# Patient Record
Sex: FEMALE | Race: BLACK OR AFRICAN AMERICAN | NOT HISPANIC OR LATINO | Employment: OTHER | URBAN - METROPOLITAN AREA
[De-identification: names, ages, dates, MRNs, and addresses within clinical notes are randomized per-mention and may not be internally consistent; named-entity substitution may affect disease eponyms.]

---

## 2017-02-01 ENCOUNTER — HOSPITAL ENCOUNTER (EMERGENCY)
Facility: HOSPITAL | Age: 82
Discharge: HOME | End: 2017-02-01
Attending: EMERGENCY MEDICINE
Payer: MEDICARE

## 2017-02-01 VITALS
BODY MASS INDEX: 23.39 KG/M2 | RESPIRATION RATE: 20 BRPM | SYSTOLIC BLOOD PRESSURE: 172 MMHG | OXYGEN SATURATION: 97 % | TEMPERATURE: 98 F | DIASTOLIC BLOOD PRESSURE: 74 MMHG | HEIGHT: 63 IN | HEART RATE: 64 BPM | WEIGHT: 132 LBS

## 2017-02-01 DIAGNOSIS — N18.9 CHRONIC KIDNEY DISEASE, UNSPECIFIED STAGE: ICD-10-CM

## 2017-02-01 DIAGNOSIS — R42 ORTHOSTATIC DIZZINESS: Primary | ICD-10-CM

## 2017-02-01 DIAGNOSIS — I71.40 ABDOMINAL AORTIC ANEURYSM: ICD-10-CM

## 2017-02-01 LAB
ALBUMIN SERPL BCP-MCNC: 3.3 G/DL
ALP SERPL-CCNC: 62 U/L
ALT SERPL W/O P-5'-P-CCNC: 17 U/L
ANION GAP SERPL CALC-SCNC: 11 MMOL/L
AST SERPL-CCNC: 24 U/L
BACTERIA #/AREA URNS HPF: ABNORMAL /HPF
BASOPHILS # BLD AUTO: 0.1 K/UL
BASOPHILS NFR BLD: 1.1 %
BILIRUB SERPL-MCNC: 0.2 MG/DL
BILIRUB UR QL STRIP: NEGATIVE
BUN SERPL-MCNC: 33 MG/DL
CALCIUM SERPL-MCNC: 9.3 MG/DL
CHLORIDE SERPL-SCNC: 106 MMOL/L
CLARITY UR: CLEAR
CO2 SERPL-SCNC: 23 MMOL/L
COLOR UR: YELLOW
CREAT SERPL-MCNC: 1.2 MG/DL
DIFFERENTIAL METHOD: ABNORMAL
EOSINOPHIL # BLD AUTO: 0.1 K/UL
EOSINOPHIL NFR BLD: 1.8 %
ERYTHROCYTE [DISTWIDTH] IN BLOOD BY AUTOMATED COUNT: 13.9 %
EST. GFR  (AFRICAN AMERICAN): 47 ML/MIN/1.73 M^2
EST. GFR  (NON AFRICAN AMERICAN): 41 ML/MIN/1.73 M^2
GLUCOSE SERPL-MCNC: 88 MG/DL
GLUCOSE UR QL STRIP: NEGATIVE
HCT VFR BLD AUTO: 32.9 %
HGB BLD-MCNC: 11.1 G/DL
HGB UR QL STRIP: NEGATIVE
HYALINE CASTS #/AREA URNS LPF: 3 /LPF
KETONES UR QL STRIP: NEGATIVE
LACTATE SERPL-SCNC: 1.2 MMOL/L
LEUKOCYTE ESTERASE UR QL STRIP: NEGATIVE
LIPASE SERPL-CCNC: 49 U/L
LYMPHOCYTES # BLD AUTO: 1.5 K/UL
LYMPHOCYTES NFR BLD: 22.2 %
MCH RBC QN AUTO: 31.8 PG
MCHC RBC AUTO-ENTMCNC: 33.8 %
MCV RBC AUTO: 94 FL
MICROSCOPIC COMMENT: ABNORMAL
MONOCYTES # BLD AUTO: 0.5 K/UL
MONOCYTES NFR BLD: 8 %
NEUTROPHILS # BLD AUTO: 4.6 K/UL
NEUTROPHILS NFR BLD: 66.9 %
NITRITE UR QL STRIP: NEGATIVE
PH UR STRIP: 6 [PH] (ref 5–8)
PLATELET # BLD AUTO: 183 K/UL
PMV BLD AUTO: 8.8 FL
POCT GLUCOSE: 102 MG/DL (ref 70–110)
POTASSIUM SERPL-SCNC: 4.9 MMOL/L
PROT SERPL-MCNC: 6.8 G/DL
PROT UR QL STRIP: ABNORMAL
RBC # BLD AUTO: 3.5 M/UL
RBC #/AREA URNS HPF: 0 /HPF (ref 0–4)
SODIUM SERPL-SCNC: 140 MMOL/L
SP GR UR STRIP: 1.02 (ref 1–1.03)
T4 FREE SERPL-MCNC: 0.73 NG/DL
TROPONIN I SERPL DL<=0.01 NG/ML-MCNC: <0.006 NG/ML
TSH SERPL DL<=0.005 MIU/L-ACNC: 10.81 UIU/ML
URN SPEC COLLECT METH UR: ABNORMAL
UROBILINOGEN UR STRIP-ACNC: NEGATIVE EU/DL
WBC # BLD AUTO: 6.9 K/UL
WBC #/AREA URNS HPF: 0 /HPF (ref 0–5)

## 2017-02-01 PROCEDURE — 99284 EMERGENCY DEPT VISIT MOD MDM: CPT | Mod: 25

## 2017-02-01 PROCEDURE — 96361 HYDRATE IV INFUSION ADD-ON: CPT

## 2017-02-01 PROCEDURE — 83605 ASSAY OF LACTIC ACID: CPT

## 2017-02-01 PROCEDURE — 36415 COLL VENOUS BLD VENIPUNCTURE: CPT

## 2017-02-01 PROCEDURE — 25000003 PHARM REV CODE 250: Performed by: EMERGENCY MEDICINE

## 2017-02-01 PROCEDURE — 85025 COMPLETE CBC W/AUTO DIFF WBC: CPT

## 2017-02-01 PROCEDURE — 84439 ASSAY OF FREE THYROXINE: CPT

## 2017-02-01 PROCEDURE — 81000 URINALYSIS NONAUTO W/SCOPE: CPT

## 2017-02-01 PROCEDURE — 84484 ASSAY OF TROPONIN QUANT: CPT

## 2017-02-01 PROCEDURE — 84443 ASSAY THYROID STIM HORMONE: CPT

## 2017-02-01 PROCEDURE — 93005 ELECTROCARDIOGRAM TRACING: CPT

## 2017-02-01 PROCEDURE — 96360 HYDRATION IV INFUSION INIT: CPT

## 2017-02-01 PROCEDURE — 83690 ASSAY OF LIPASE: CPT

## 2017-02-01 PROCEDURE — 80053 COMPREHEN METABOLIC PANEL: CPT

## 2017-02-01 RX ADMIN — SODIUM CHLORIDE 500 ML: 0.9 INJECTION, SOLUTION INTRAVENOUS at 03:02

## 2017-02-01 NOTE — ED PROVIDER NOTES
"Encounter Date: 2/1/2017       History     Chief Complaint   Patient presents with    General Illness     Nausea, weakness, heart beating fast.  Denies fever.  No vomiting.  Onset of symtoms Monday.       Review of patient's allergies indicates:   Allergen Reactions    Carvedilol Other (See Comments)     Bradycardia and syncope    Boniva [ibandronate]     Codeine     Hydralazine analogues     Iodinated contrast media - iv dye Hives    Lisinopril     Morphine      HPI Comments: Pt is an 86 year old female with a PMH of T2DM, CKD, hypothyroid, HTN, COPD without oxygen therapy, AAA without rupture, anemia, recurrent syncope and precyncope with work up presenting to the ED with complaints of a brief period of dizziness with postural change and then noting "heart fluttering" that rapidly resolved. She has also been reporting a sensation of abdominal bloating and belching over the last 24 hours. She reports a history of dehydration and has been evaluated by cardiology multiple times in the past but states no acute cardiac pathology could be noted at that time. She states she has not been drinking as much water daily as recommended by her doctors because of frequent urination and this includes the last few days. She denies associated syncope, CP, SOB, abdominal pain, dysuria, confusion, visual or auditory changes, fever, unilateral or BL LE swelling, change in food intake, lateralizing weakness, or strength or sensation deficits.     The history is provided by the patient.     Past Medical History   Diagnosis Date    Anticoagulant long-term use      aspirin    COPD (chronic obstructive pulmonary disease)     COPD with acute exacerbation 1/9/2015    Diabetes mellitus type II     Encounter for blood transfusion     Hip arthritis 3/1/2016    Hyperlipidemia     Hypertension     Thyroid disease      hypothyroid     Past Medical History Pertinent Negatives   Diagnosis Date Noted    Cancer 1/8/2015    CHF " (congestive heart failure) 1/8/2015    Coronary artery disease 1/8/2015    Seizures 1/8/2015    Stroke 1/8/2015    Transfusion reaction 1/8/2015     Past Surgical History   Procedure Laterality Date    Hysterectomy      Appendectomy      Varicose vein surgery      Fracture surgery       right hip     Hernia repair       groin     Family History   Problem Relation Age of Onset    Hypertension Mother     Diabetes Sister     Diabetes Brother     Kidney disease Son      Social History   Substance Use Topics    Smoking status: Former Smoker     Years: 44.00     Types: Cigarettes     Quit date: 4/30/2015    Smokeless tobacco: Never Used      Comment: 1/08/2015    Alcohol use No     Review of Systems   Constitutional: Negative for fever.   HENT:        Dry mouth and mucus membranes   Eyes: Negative for visual disturbance.   Respiratory: Negative for shortness of breath.    Cardiovascular: Positive for palpitations. Negative for chest pain.   Gastrointestinal: Negative for nausea and vomiting.   Genitourinary: Negative for dysuria.   Skin: Negative for rash.   Neurological: Negative for headaches.   Psychiatric/Behavioral: Negative for confusion.       Physical Exam   Initial Vitals   BP Pulse Resp Temp SpO2   02/01/17 1333 02/01/17 1333 02/01/17 1333 02/01/17 1333 02/01/17 1333   148/67 67 20 97.8 °F (36.6 °C) 97 %     Physical Exam    Nursing note and vitals reviewed.  Constitutional: She is not diaphoretic. No distress.   HENT:   Head: Normocephalic and atraumatic.   Right Ear: External ear normal.   Left Ear: External ear normal.   Dry mucus membranes    Eyes: Conjunctivae and EOM are normal.   Neck: Normal range of motion. Neck supple.   Cardiovascular: Normal rate, regular rhythm and intact distal pulses.   Pulmonary/Chest: Breath sounds normal. No respiratory distress. She has no wheezes.   Abdominal: Soft. Bowel sounds are normal. She exhibits no distension. There is no tenderness.   No palpable  pulsatile mass   Musculoskeletal: Normal range of motion. She exhibits no edema.   Neurological: She is alert and oriented to person, place, and time. She has normal strength. No cranial nerve deficit or sensory deficit.   Skin: Skin is warm and dry. No rash noted.   Psychiatric: She has a normal mood and affect. Thought content normal.         ED Course   Procedures  Labs Reviewed   CBC W/ AUTO DIFFERENTIAL - Abnormal; Notable for the following:        Result Value    RBC 3.50 (*)     Hemoglobin 11.1 (*)     Hematocrit 32.9 (*)     MCH 31.8 (*)     MPV 8.8 (*)     All other components within normal limits   COMPREHENSIVE METABOLIC PANEL - Abnormal; Notable for the following:     BUN, Bld 33 (*)     Albumin 3.3 (*)     eGFR if  47 (*)     eGFR if non  41 (*)     All other components within normal limits   URINALYSIS - Abnormal; Notable for the following:     Protein, UA 2+ (*)     All other components within normal limits   TSH - Abnormal; Notable for the following:     TSH 10.815 (*)     All other components within normal limits   URINALYSIS MICROSCOPIC - Abnormal; Notable for the following:     Hyaline Casts, UA 3 (*)     All other components within normal limits   LACTIC ACID, PLASMA   LIPASE   TROPONIN I   T4, FREE   POCT GLUCOSE   POCT GLUCOSE MONITORING CONTINUOUS     EKG Readings: (Independently Interpreted)   Initial Reading: No STEMI. Previous EKG: Compared with most recent EKG Previous EKG Date: 11/11/2016. Heart Rate: 62.   Normal sinus rhythm with sinus arrhythmia, septal infarct, age undetermined, normal axis, normal intervals          Medical Decision Making:   Initial Assessment:   Pt noted to be in NAD on initial exam. Nonfocal. Cardiac exam WNL. Did appear mild-moderatly dehydrated on PE. Orthostatic hypotension present on examination as indicated by a >20 mm Hg drop when moving from recumbent to standing. Iv was established and she was provided bolus hydration with  oral rehydration as well.   Differential Diagnosis:   DDX include (but are not limited to) atypical ACS, occult infection including UTI, mild-moderate dehydration, electrolyte abnormality, Hypothyroidism, pancreatitis, gastritis, AAA enlargement versus rupture, hypoglycemia, anemia   Clinical Tests:   Lab Tests: Ordered and Reviewed  Radiological Study: Ordered and Reviewed  ED Management:  Patient received hydration therapy and monitoring in the presence of her two daughters. Screening blood work and abdominal aorta US were obtained and found to be negative for significant change. On reassessment, she reported feeling better with no recurrence of reported symptoms, even after posture changes and walking around the ED at different intervals  Other:   I have discussed this case with another health care provider.  I did have a lengthy with the patient and family after a prolonged period of ED observation and monitoring. She was requesting DC and I do feel she is currently stable for DC. She was educated about hydration and why these complaints appear to be recurring. I spoke with her cardiologist Dr Herbert regarding disposition and office follow up which he did endorse. She was additionally asked to f/u with her PCP as soon as possible.     She was urged to the return to the ED immediately for any new, worsening or concerning symptoms, pt. Agreeable with this plan for f/u and was discharged in stable condition.                    ED Course     Clinical Impression:   The primary encounter diagnosis was Orthostatic dizziness. Diagnoses of Abdominal aortic aneurysm and Chronic kidney disease, unspecified stage were also pertinent to this visit.          Gee Carbone MD  02/02/17 0735

## 2017-02-01 NOTE — ED AVS SNAPSHOT
[facility] [address]  [place] [address]               Blaire Cage   2017  1:38 PM   ED    Description:  Female : 1930   Department:  [facility]           Your Care was Coordinated By:     Provider Role From To    Gee Carbone MD Attending Provider 17 1426 --      Reason for Visit     General Illness           Diagnoses this Visit        Comments    Orthostatic dizziness    -  Primary     Abdominal aortic aneurysm         Chronic kidney disease, unspecified stage           ED Disposition     ED Disposition Condition Comment    Discharge             To Do List           Follow-up Information     Follow up with Moises Banks MD.    Specialty:  Family Medicine    Contact information:    [address]  [address]  [place] [address]  [phone]          Follow up with Ashok Herbert MD. Call in 1 day.    Specialty:  Cardiology    Contact information:    [address]  [address]  [place] [address]  [phone]        [facility] On Call     [facility] -  Assistance  Registered nurses in the [facility] provide clinical advisement, health education, appointment booking, and other advisory services.  Call for this free service at [phone].             Medications           Message regarding Medications     Verify the changes and/or additions to your medication regime listed below are the same as discussed with your clinician today.  If any of these changes or additions are incorrect, please notify your healthcare provider.        These medications were administered today        Dose Freq    sodium chloride 0.9% bolus 500 mL 500 mL ED 1 Time    Sig: Inject 500 mLs into the vein ED 1 Time.    Class: Normal    Route: Intravenous           Verify that the below list of medications is an accurate representation of the medications you are currently taking.  If none reported, the list may be  blank. If incorrect, please contact your healthcare provider. Carry this list with you in case of emergency.           Current Medications     albuterol-ipratropium 2.5mg-0.5mg/3mL (DUO-NEB) 0.5 mg-3 mg(2.5 mg base)/3 mL nebulizer solution Take by nebulization.     amlodipine (NORVASC) 10 MG tablet Take 1 tablet (10 mg total) by mouth once daily.    arformoterol (BROVANA) 15 mcg/2 mL Nebu Take 2 mLs (15 mcg total) by nebulization 2 (two) times daily.    ascorbic acid (VITAMIN C) 500 MG tablet Take 500 mg by mouth once daily.      aspirin (ECOTRIN) 81 MG EC tablet Take 81 mg by mouth once daily.      blood sugar diagnostic Strp 1 strip by Misc.(Non-Drug; Combo Route) route 2 (two) times daily.    calcium carbonate (OS-TASIA) 500 mg calcium (1,250 mg) tablet Take 1 tablet by mouth 2 (two) times daily.      citalopram (CELEXA) 10 MG tablet Take 1 tablet (10 mg total) by mouth once daily.    CLEVER CHEK BLOOD GLUCOSE SYST kit     docusate sodium (COLACE) 100 MG capsule Take 1 capsule (100 mg total) by mouth 2 (two) times daily.    ferrous sulfate 325 mg (65 mg iron) Tab tablet Take 325 mg by mouth daily with breakfast.    fish oil-omega-3 fatty acids 300-1,000 mg capsule Take 2 g by mouth every evening.     fluconazole (DIFLUCAN) 150 MG Tab 1 tab PO x 1 now, then repeat in 1 week    fluticasone (FLONASE) 50 mcg/actuation nasal spray 1 spray by Each Nare route once daily.    gabapentin (NEURONTIN) 100 MG capsule Take 1 capsule (100 mg total) by mouth every evening. Take 200 mg qhs X 1 week then 100 mg qhs    guanfacine (TENEX) 1 MG Tab Take 1 tablet (1 mg total) by mouth every evening.    lancets (ULTRA THIN LANCETS) 28 gauge [facility] 1 lancet by Subdermal route 2 (two) times daily.    levalbuterol (XOPENEX) 1.25 mg/0.5 mL nebulizer solution Take 0.5 mLs (1.25 mg total) by nebulization every 8 (eight) hours as needed.    levothyroxine (SYNTHROID) 75 MCG tablet Take 1 tablet (75 mcg total) by mouth once daily.    magnesium  "oxide (MAG-OX) 400 mg tablet take by mouth once daily    nicotine (NICODERM CQ) 7 mg/24 hr Place 1 patch onto the skin once daily.    nitroGLYCERIN (NITROSTAT) 0.4 MG SL tablet Place 1 tablet (0.4 mg total) under the tongue every 5 (five) minutes as needed for Chest pain. As needed    ondansetron (ZOFRAN-ODT) 4 MG TbDL Take 1 tablet (4 mg total) by mouth every 8 (eight) hours as needed. Every 8 hours as needed for nausea/vomiting    simvastatin (ZOCOR) 40 MG tablet Take 1 tablet (40 mg total) by mouth every evening.    vitamin D 1000 units Tab Take 1 tablet (1,000 Units total) by mouth once daily.           Clinical Reference Information           Your Vitals Were     BP Pulse Temp Resp Height Weight    183/79 64 97.8 °F (36.6 °C) (Oral) 20 5' 3" (1.6 m) 59.9 kg (132 lb)    Last Period SpO2 BMI          (LMP Unknown) 97% 23.38 kg/m2        Allergies as of 2/1/2017        Reactions    Carvedilol Other (See Comments)    Bradycardia and syncope    Boniva [Ibandronate]     Codeine     Hydralazine Analogues     Iodinated Contrast Media - Iv Dye Hives    Lisinopril     Morphine       Immunizations Administered on Date of Encounter - 2/1/2017     None      ED Micro, Lab, POCT     Start Ordered       Status Ordering Provider    02/01/17 1511 02/01/17 1511  POCT glucose  Once      Final result     02/01/17 1449 02/01/17 1449  TSH  STAT      Final result     02/01/17 1449 02/01/17 1449  T4, free  Once      Final result     02/01/17 1449 02/01/17 1449  Urinalysis Microscopic  Once      Final result     02/01/17 1448 02/01/17 1449  Complete Blood Count (CBC)  STAT      Final result     02/01/17 1448 02/01/17 1449  Comprehensive Metabolic Panel (CMP)  STAT      Final result     02/01/17 1448 02/01/17 1449  Lactic acid, plasma  STAT      Final result     02/01/17 1448 02/01/17 1449  Lipase  STAT      Final result     02/01/17 1448 02/01/17 1449  Troponin I  STAT      Final result     02/01/17 1448 02/01/17 1449  Urinalysis  Once  "     Final result     02/01/17 1448 02/01/17 1449  POCT glucose  Once      Acknowledged       ED Imaging Orders     Start Ordered       Status Ordering Provider    02/01/17 1455 02/01/17 1454  US Abdominal Aorta  1 time imaging      Final result         Discharge Instructions         Possible Causes of Dizziness or Fainting  Dizziness and fainting can have many causes. Below are some examples of possible causes your healthcare provider will look to rule out.    Benign paroxysmal positional vertigo (BPPV)  BPPV results when calcium crystals inside the inner ear shift into the wrong position. BPPV causes episodes of vertigo (a spinning sensation). Episodes most often happen when the head is moved in a certain way. This is more common in people 65 and older.   Infection or inflammation  The semicircular canals of the ear may become infected or inflamed. In this case, they can send the wrong balance signals. This can cause vertigo.  Meniere disease  Meniere disease happens when there is too much fluid in the semicircular canals. This can cause vertigo. It also can cause hearing problems and buzzing or ringing in the ears (called tinnitus). You may also have a feeling of pressure or fullness in the ear.  Syncope  Syncope is fainting that happens when the brain doesnt get enough oxygen-rich blood. It can be caused by low heart rate or low blood pressure. This is called vasovagal syncope. It can also be caused by sitting or standing up too quickly. This is called orthostatic hypotension. Syncope may also be due to a heart valve problem, an abnormal heart rhythm, or other heart problems. Dizziness can also happen from stroke, hemorrhage in the brain, or other problems in the brain. Your healthcare provider may do certain tests to rule out these conditions.  Other causes  Other causes include:  · Medicines. Certain medicines can cause dizziness and even fainting. In some cases, stopping a medicine too quickly can lead to  withdrawal symptoms, including dizziness and fainting.  · Anxiety. Being anxious can lead to breathing changes, such as hyperventilation. These can lead to dizziness and fainting.  Additional causes for dizziness and fainting also exist. Talk to your healthcare provider for more information.     © 3745-6806 [employer]. [address] [place] [address]. All rights reserved. This information is not intended as a substitute for professional medical care. Always follow your healthcare professional's instructions.          Your Scheduled Appointments     Feb 14, 2017  3:30 PM CST   Established Patient Visit with Ashok Herbert MD   [facility] - Cardiology ([facility] [place] [facility])    [address]  [place] [address]   [phone]            Mar 02, 2017  2:00 PM CST   Established Patient Visit with Moises Banks MD   [place] - Family Medicine ([place])    [address]  [place] [address]   [phone]            Mar 07, 2017 11:20 AM CST   Established Patient Visit with LUIS [facility] - Podiatry ([place])    [address]  [place] [address]   [phone]            Mar 16, 2017 10:15 AM CDT   Non-Fasting Lab with [facility] ([place])    [address]  [place] [address]   [phone]            Mar 16, 2017  1:00 PM CDT   Established Patient Visit with MD David Avelar - Hematology Oncology ([place] - [address])    [address] [place] [address] Drive [address]  [facility] [address]   [phone]              [facility] Sign-Up     Activating your [facility] account is as easy as 1-2-3!     1) Visit [email or web address], select Sign Up Now, enter this activation code and your date of birth, then select Next.  Activation code not generated  Current Patient Portal Status: Account disabled      2) Create a username and password to use when you visit [facility] in the future and select a security question in case you  lose your password and select Next.    3) Enter your e-mail address and click Sign Up!    Additional Information  If you have questions, please e-mail [email or web address] or call [phone] to talk to our [facility] staff. Remember, [facility] is NOT to be used for urgent needs. For medical emergencies, dial 911.         Smoking Cessation     If you would like to quit smoking:   You may be eligible for free services if you are a [place] resident and started smoking cigarettes before September 1, 1988.  Call the Smoking Cessation Trust (SCT) toll free at (163) 488-5169 or (354) 135-1935.   Call 1-127-QUIT-NOW if you do not meet the above criteria.             [facility] complies with applicable Federal civil rights laws and does not discriminate on the basis of race, color, national origin, age, disability, or sex.        Language Assistance Services     ATTENTION: Language assistance services are available, free of charge. Please call [phone].      ATENCIÓN: Si habla español, tiene a mir disposición servicios gratuitos de asistencia lingüística. Llame al [phone].     CHÚ Ý: N?u b?n nói Ti?ng Vi?t, có các d?ch v? h? tr? ngôn ng? mi?n phí dành cho b?n. G?i s? [phone].

## 2017-02-01 NOTE — ED NOTES
C/o feeling nauseas since Monday and today felt weak and like her heart was racing. Denies abd pain or vomiting. AAO x4. Skin warm and dry

## 2017-02-02 ENCOUNTER — DOCUMENTATION ONLY (OUTPATIENT)
Dept: FAMILY MEDICINE | Facility: CLINIC | Age: 82
End: 2017-02-02

## 2017-02-02 NOTE — PROGRESS NOTES
Pre-Visit Chart Review  For Appointment Scheduled on 02/03/2017    Health Maintenance Due   Topic Date Due    TETANUS VACCINE  07/21/1948    Eye Exam  04/08/2015

## 2017-02-02 NOTE — DISCHARGE INSTRUCTIONS
Possible Causes of Dizziness or Fainting  Dizziness and fainting can have many causes. Below are some examples of possible causes your healthcare provider will look to rule out.    Benign paroxysmal positional vertigo (BPPV)  BPPV results when calcium crystals inside the inner ear shift into the wrong position. BPPV causes episodes of vertigo (a spinning sensation). Episodes most often happen when the head is moved in a certain way. This is more common in people 65 and older.   Infection or inflammation  The semicircular canals of the ear may become infected or inflamed. In this case, they can send the wrong balance signals. This can cause vertigo.  Meniere disease  Meniere disease happens when there is too much fluid in the semicircular canals. This can cause vertigo. It also can cause hearing problems and buzzing or ringing in the ears (called tinnitus). You may also have a feeling of pressure or fullness in the ear.  Syncope  Syncope is fainting that happens when the brain doesnt get enough oxygen-rich blood. It can be caused by low heart rate or low blood pressure. This is called vasovagal syncope. It can also be caused by sitting or standing up too quickly. This is called orthostatic hypotension. Syncope may also be due to a heart valve problem, an abnormal heart rhythm, or other heart problems. Dizziness can also happen from stroke, hemorrhage in the brain, or other problems in the brain. Your healthcare provider may do certain tests to rule out these conditions.  Other causes  Other causes include:  · Medicines. Certain medicines can cause dizziness and even fainting. In some cases, stopping a medicine too quickly can lead to withdrawal symptoms, including dizziness and fainting.  · Anxiety. Being anxious can lead to breathing changes, such as hyperventilation. These can lead to dizziness and fainting.  Additional causes for dizziness and fainting also exist. Talk to your healthcare provider for more  information.     © 8880-2855 [employer]. [address] [place] [address], [place] [address]. All rights reserved. This information is not intended as a substitute for professional medical care. Always follow your healthcare professional's instructions.

## 2017-02-03 ENCOUNTER — OFFICE VISIT (OUTPATIENT)
Dept: FAMILY MEDICINE | Facility: CLINIC | Age: 82
End: 2017-02-03
Payer: MEDICARE

## 2017-02-03 VITALS
BODY MASS INDEX: 24.22 KG/M2 | HEIGHT: 63 IN | DIASTOLIC BLOOD PRESSURE: 58 MMHG | TEMPERATURE: 98 F | SYSTOLIC BLOOD PRESSURE: 138 MMHG | WEIGHT: 136.69 LBS | HEART RATE: 58 BPM

## 2017-02-03 DIAGNOSIS — E86.0 DEHYDRATION: Primary | ICD-10-CM

## 2017-02-03 PROCEDURE — 1157F ADVNC CARE PLAN IN RCRD: CPT | Mod: S$GLB,,, | Performed by: PHYSICIAN ASSISTANT

## 2017-02-03 PROCEDURE — 1159F MED LIST DOCD IN RCRD: CPT | Mod: S$GLB,,, | Performed by: PHYSICIAN ASSISTANT

## 2017-02-03 PROCEDURE — 99999 PR PBB SHADOW E&M-EST. PATIENT-LVL V: CPT | Mod: PBBFAC,,, | Performed by: PHYSICIAN ASSISTANT

## 2017-02-03 PROCEDURE — 1160F RVW MEDS BY RX/DR IN RCRD: CPT | Mod: S$GLB,,, | Performed by: PHYSICIAN ASSISTANT

## 2017-02-03 PROCEDURE — 99214 OFFICE O/P EST MOD 30 MIN: CPT | Mod: S$GLB,,, | Performed by: PHYSICIAN ASSISTANT

## 2017-02-03 PROCEDURE — 1125F AMNT PAIN NOTED PAIN PRSNT: CPT | Mod: S$GLB,,, | Performed by: PHYSICIAN ASSISTANT

## 2017-02-03 RX ORDER — SODIUM CHLORIDE 9 MG/ML
INJECTION, SOLUTION INTRAVENOUS
Status: DISCONTINUED | OUTPATIENT
Start: 2017-02-03 | End: 2017-02-14

## 2017-02-03 NOTE — PATIENT INSTRUCTIONS
Dehydration (Adult)  Dehydration occurs when your body loses too much fluid. This may be the result of prolonged vomiting or diarrhea, excessive sweating, or a high fever. It may also happen if you dont drink enough fluid when youre sick or out in the heat. Misuse of diuretics (water pills) can also be a cause.  Symptoms include thirst and decreased urine output. You may also feel dizzy, weak, fatigued, or very drowsy. The diet described below is usually enough to treat dehydration. In some cases, you may need medicine.  Home care  · Drink at least 12 8-ounce glasses of fluid every day to resolve the dehydration. Fluid may include water; orange juice; lemonade; apple, grape, or cranberry juice; clear fruit drinks; electrolyte replacement and sports drinks; and teas and coffee without caffeine. If you have been diagnosed with a kidney disease, ask your doctor how much and what types of fluids you should drink to prevent dehydration. If you have kidney disease, fluid can build up in the body. This can be dangerous to your health.  · If you have a fever, muscle aches, or a headache as a result of a cold or flu, you may take acetaminophen or ibuprofen, unless another medicine was prescribed. If you have chronic liver or kidney disease, or have ever had a stomach ulcer or gastrointestinal bleeding, talk with your health care provider before using these medicines. Don't take aspirin if you are younger than 18 and have a fever. Aspirin raises the chance for severe liver injury.  Follow-up care  Follow up with your health care provider, or as advised.  When to seek medical advice  Call your health care provider right away if any of these occur:  · Continued vomiting  · Frequent diarrhea (more than 5 times a day); blood (red or black color) or mucus in diarrhea  · Blood in vomit or stool  · Swollen abdomen or increasing abdominal pain  · Weakness, dizziness, or fainting  · Unusual drowsiness or confusion  · Reduced urine  output or extreme thirst  · Fever of 100.4°F (34°C) or higher  Date Last Reviewed: 5/31/2015  © 6148-8008 [employer]. [address] [place] [address], [place] [address]. All rights reserved. This information is not intended as a substitute for professional medical care. Always follow your healthcare professional's instructions.

## 2017-02-03 NOTE — MR AVS SNAPSHOT
[facility]  [address]  [place] [address]  Phone: [phone]  Fax: [phone]                  Blaire Cage   2/3/2017 11:00 AM   Office Visit    Description:  Female : 1930   Provider:  ELLIE Cruz   Department:  [facility]           Reason for Visit     Follow-up           Diagnoses this Visit        Comments    Dehydration    -  Primary            To Do List           Future Appointments        Provider Department Dept Phone    2017 3:30 PM Ashok Herbert MD [facility] - Cardiology [phone]    3/2/2017 2:00 PM Moises Banks MD [facility] [phone]    3/7/2017 11:20 AM Derrick Hendricks DPM [facility] [phone]    3/16/2017 10:15 AM LAB, KAVITA SAT [facility] [phone]    3/16/2017 1:00 PM Trinity Carr MD [facility] Oncology [phone]      Goals (5 Years of Data)     None      [facility]     [facility] Care Line -  Assistance  Registered nurses in the [facility] provide clinical advisement, health education, appointment booking, and other advisory services.  Call for this free service at [phone].             Medications           Message regarding Medications     Verify the changes and/or additions to your medication regime listed below are the same as discussed with your clinician today.  If any of these changes or additions are incorrect, please notify your healthcare provider.        These medications were administered today        Dose Freq    0.9%  NaCl infusion  Clinic/HOD 1 time    Sig: Inject into the vein one time.    Class: Normal    Route: Intravenous           Verify that the below list of medications is an accurate representation of the medications you are currently taking.  If none reported, the list may be blank. If incorrect, please contact your healthcare provider. Carry this list with you in case of emergency.            Current Medications     albuterol-ipratropium 2.5mg-0.5mg/3mL (DUO-NEB) 0.5 mg-3 mg(2.5 mg base)/3 mL nebulizer solution Take by nebulization.     amlodipine (NORVASC) 10 MG tablet Take 1 tablet (10 mg total) by mouth once daily.    arformoterol (BROVANA) 15 mcg/2 mL Nebu Take 2 mLs (15 mcg total) by nebulization 2 (two) times daily.    ascorbic acid (VITAMIN C) 500 MG tablet Take 500 mg by mouth once daily.      aspirin (ECOTRIN) 81 MG EC tablet Take 81 mg by mouth once daily.      blood sugar diagnostic Strp 1 strip by Misc.(Non-Drug; Combo Route) route 2 (two) times daily.    calcium carbonate (OS-TASIA) 500 mg calcium (1,250 mg) tablet Take 1 tablet by mouth 2 (two) times daily.      citalopram (CELEXA) 10 MG tablet Take 1 tablet (10 mg total) by mouth once daily.    CLEVER CHEK BLOOD GLUCOSE SYST kit     docusate sodium (COLACE) 100 MG capsule Take 1 capsule (100 mg total) by mouth 2 (two) times daily.    ferrous sulfate 325 mg (65 mg iron) Tab tablet Take 325 mg by mouth daily with breakfast.    fish oil-omega-3 fatty acids 300-1,000 mg capsule Take 2 g by mouth every evening.     fluconazole (DIFLUCAN) 150 MG Tab 1 tab PO x 1 now, then repeat in 1 week    fluticasone (FLONASE) 50 mcg/actuation nasal spray 1 spray by Each Nare route once daily.    gabapentin (NEURONTIN) 100 MG capsule Take 1 capsule (100 mg total) by mouth every evening. Take 200 mg qhs X 1 week then 100 mg qhs    guanfacine (TENEX) 1 MG Tab Take 1 tablet (1 mg total) by mouth every evening.    lancets (ULTRA THIN LANCETS) 28 gauge [facility] 1 lancet by Subdermal route 2 (two) times daily.    levalbuterol (XOPENEX) 1.25 mg/0.5 mL nebulizer solution Take 0.5 mLs (1.25 mg total) by nebulization every 8 (eight) hours as needed.    levothyroxine (SYNTHROID) 75 MCG tablet Take 1 tablet (75 mcg total) by mouth once daily.    magnesium oxide (MAG-OX) 400 mg tablet take by mouth once daily    nicotine (NICODERM CQ) 7 mg/24 hr Place 1 patch onto the  "skin once daily.    nitroGLYCERIN (NITROSTAT) 0.4 MG SL tablet Place 1 tablet (0.4 mg total) under the tongue every 5 (five) minutes as needed for Chest pain. As needed    ondansetron (ZOFRAN-ODT) 4 MG TbDL Take 1 tablet (4 mg total) by mouth every 8 (eight) hours as needed. Every 8 hours as needed for nausea/vomiting    simvastatin (ZOCOR) 40 MG tablet Take 1 tablet (40 mg total) by mouth every evening.    vitamin D 1000 units Tab Take 1 tablet (1,000 Units total) by mouth once daily.           Clinical Reference Information           Your Vitals Were     BP Pulse Temp Height Weight Last Period    138/58 (BP Location: Left arm, Patient Position: Sitting, BP Method: Automatic) 58 98 °F (36.7 °C) (Oral) 5' 3" (1.6 m) 62 kg (136 lb 11 oz) (LMP Unknown)    BMI                24.21 kg/m2          Blood Pressure          Most Recent Value    BP  (!)  138/58      Allergies as of 2/3/2017     Carvedilol    Boniva [Ibandronate]    Codeine    Hydralazine Analogues    Iodinated Contrast Media - Iv Dye    Lisinopril    Morphine      Immunizations Administered on Date of Encounter - 2/3/2017     None      [facility] Sign-Up     Activating your [facility] account is as easy as 1-2-3!     1) Visit [email or web address], select Sign Up Now, enter this activation code and your date of birth, then select Next.  Activation code not generated  Current Patient Portal Status: Account disabled      2) Create a username and password to use when you visit [facility] in the future and select a security question in case you lose your password and select Next.    3) Enter your e-mail address and click Sign Up!    Additional Information  If you have questions, please e-mail [email or web address] or call [phone] to talk to our [facility] staff. Remember, [facility] is NOT to be used for urgent needs. For medical emergencies, dial 911.         Language Assistance Services     ATTENTION: Language assistance services are available, free of charge. Please " call [phone].      ATENCIÓN: Si habla español, tiene a mir disposición servicios gratuitos de asistencia lingüística. Llame al [phone].     CHÚ Ý: N?u b?n nói Ti?ng Vi?t, có các d?ch v? h? tr? ngôn ng? mi?n phí dành cho b?n. G?i s? [phone].         [facility] complies with applicable Federal civil rights laws and does not discriminate on the basis of race, color, national origin, age, disability, or sex.

## 2017-02-03 NOTE — PROGRESS NOTES
Subjective:       Patient ID: Blaire Cage is a 86 y.o. female.    Chief Complaint: Follow-up (ER visit)    HPI Comments: Patient presents for follow up of ER visit.  She was seen in ER 2 days ago for dizziness and dehydration.  She has had this several times in the past.  She has had syncope with similar episodes.  She has CKD stage 3, diabetes, hypertension.  At her ER visit 2 days ago she was getting IV fluids but requested to leave because there was another patient in the room who was causing her to feel uncomfortable.   She continues to feel dizzy.       Review of Systems   Constitutional: Positive for fatigue. Negative for appetite change, chills, diaphoresis and unexpected weight change.   Respiratory: Negative for chest tightness, shortness of breath and wheezing.    Cardiovascular: Negative for palpitations and leg swelling.   Gastrointestinal: Positive for nausea. Negative for abdominal distention, abdominal pain, anal bleeding and blood in stool.        Positive for loose stools      Genitourinary: Negative for difficulty urinating, dysuria, flank pain, frequency, hematuria and urgency.   Neurological: Positive for dizziness, weakness, light-headedness and headaches. Negative for syncope and numbness.       Objective:      Physical Exam   Constitutional: She appears well-developed and well-nourished. She is cooperative. No distress.   Orthostatics  bp lying 155/60  p 58  Sitting 150/66 p 58  Standing bp 134/56 p 71     HENT:   Head: Normocephalic and atraumatic.   Mouth/Throat: Mucous membranes are pale and dry.   Eyes: Conjunctivae and EOM are normal. Pupils are equal, round, and reactive to light. No scleral icterus.   Cardiovascular: Normal rate, regular rhythm and normal heart sounds.    Pulmonary/Chest: Effort normal and breath sounds normal.   Abdominal: Soft. Bowel sounds are normal.   Musculoskeletal:        Right lower leg: She exhibits no edema.        Left lower leg: She exhibits no edema.    Neurological: She is alert.   Skin: Skin is warm and dry. No rash noted.   Psychiatric: She has a normal mood and affect. Her speech is normal. Judgment normal. Cognition and memory are normal.   Vitals reviewed.      Assessment:       1. Dehydration        Plan:       Blaire was seen today for follow-up.    Diagnoses and all orders for this visit:    Dehydration  -       0.9%  NaCl infusion; Inject into the vein one time.  BP after 500 cc 149/70.    Encourage increase fluids/ oral hydration

## 2017-02-07 RX ORDER — IPRATROPIUM BROMIDE AND ALBUTEROL SULFATE 2.5; .5 MG/3ML; MG/3ML
SOLUTION RESPIRATORY (INHALATION)
Qty: 180 ML | Refills: 0 | Status: SHIPPED | OUTPATIENT
Start: 2017-02-07 | End: 2017-03-22 | Stop reason: SDUPTHER

## 2017-02-14 ENCOUNTER — OFFICE VISIT (OUTPATIENT)
Dept: CARDIOLOGY | Facility: CLINIC | Age: 82
End: 2017-02-14
Payer: MEDICARE

## 2017-02-14 VITALS
BODY MASS INDEX: 24.1 KG/M2 | HEIGHT: 63 IN | HEART RATE: 78 BPM | WEIGHT: 136 LBS | DIASTOLIC BLOOD PRESSURE: 89 MMHG | OXYGEN SATURATION: 97 % | RESPIRATION RATE: 16 BRPM | SYSTOLIC BLOOD PRESSURE: 172 MMHG

## 2017-02-14 DIAGNOSIS — I15.2 HYPERTENSION ASSOCIATED WITH DIABETES: Primary | ICD-10-CM

## 2017-02-14 DIAGNOSIS — E11.59 HYPERTENSION ASSOCIATED WITH DIABETES: Primary | ICD-10-CM

## 2017-02-14 DIAGNOSIS — I11.9 HYPERTENSIVE LEFT VENTRICULAR HYPERTROPHY, WITHOUT HEART FAILURE: ICD-10-CM

## 2017-02-14 DIAGNOSIS — E86.0 DEHYDRATION: ICD-10-CM

## 2017-02-14 PROCEDURE — 99214 OFFICE O/P EST MOD 30 MIN: CPT | Mod: S$GLB,,, | Performed by: INTERNAL MEDICINE

## 2017-02-14 PROCEDURE — 1126F AMNT PAIN NOTED NONE PRSNT: CPT | Mod: S$GLB,,, | Performed by: INTERNAL MEDICINE

## 2017-02-14 PROCEDURE — 1159F MED LIST DOCD IN RCRD: CPT | Mod: S$GLB,,, | Performed by: INTERNAL MEDICINE

## 2017-02-14 PROCEDURE — 1160F RVW MEDS BY RX/DR IN RCRD: CPT | Mod: S$GLB,,, | Performed by: INTERNAL MEDICINE

## 2017-02-14 PROCEDURE — 99999 PR PBB SHADOW E&M-EST. PATIENT-LVL III: CPT | Mod: PBBFAC,,, | Performed by: INTERNAL MEDICINE

## 2017-02-14 PROCEDURE — 1157F ADVNC CARE PLAN IN RCRD: CPT | Mod: S$GLB,,, | Performed by: INTERNAL MEDICINE

## 2017-02-14 PROCEDURE — 99499 UNLISTED E&M SERVICE: CPT | Mod: S$PBB,,, | Performed by: INTERNAL MEDICINE

## 2017-02-14 NOTE — PROGRESS NOTES
"Subjective:    Patient ID:  Blaire Cage is a 86 y.o. female who presents for evaluation of No chief complaint on file.  For HTN, nausea, weakness, tachycardia and dehydration, ED visit on 2/2/2017  PCP: Dr. Mauricio, now Dr. Edmonds see every 3-4 months  Prior cardiologist: Dr. Fisher, last seen 10/2015  Lives alone, no pets, pet sit daughter's dog, 3 daughters in the area, Trudi, works at [employer], have own POA. 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 female with PMH of COPD, hyperlipidemia, hypertension, hypothyroidism, GERD and arthritis presents to the ED for evaluation of lightheadedness "room spinning" with Nausea and vomiting. She states she was getting ready for [employer] and was standing at counter when she began to have "spinning, so I sat down in chair and started vomiting." She reports near syncope while talking with family on phone. Denies chest pain and shortness of breath. She is asymptomatic at this time. Patient noted to have bradycardia on telemetry.    Hospital Course (synopsis of major diagnoses, care, treatment, and services provided during the course of the hospital stay): Patient with bradycardia. Negative orthostatics. Patiens HR improved changing metoprolol to coreg. She was noted to have hypotension which improved with gentle hydration. Patient feeling better without complaints. Denies further N/V since admittance. Will decreased norvasc to 5 mg daily."    Old record reviewed, had Echo in 10/2015 - CONCLUSIONS     1 - Concentric hypertrophy. LV wall thickness is abnormal, with the septum and the posterior wall each measuring 1.3 cm across.    2 - Normal left ventricular systolic function (EF 60-65%).     3 - Left ventricular diastolic dysfunction. E/e'(lat) is 31    4 - Normal right ventricular systolic function .     5 - " "Pulmonary hypertension. The estimated PA systolic pressure is 47 mmHg.     6 - Trivial to mild aortic stenosis, JOHN = 1.72 cm2, peak velocity = 1.75 m/s, mean gradient = 7.0 mmHg.     7 - Mild mitral regurgitation.     8 - Mild tricuspid regurgitation.     Event monitor, 30 days - TEST DESCRIPTION   8 episodes of "heart racing" - in NSR with PAC, rate 59 to 113 bpm.  5 episodes of "lightheaded" - in NSR with PAC, rate 58 to 84 bpm.  3 episodes of "chest pain" - NSR with PAC, rate 67 to 88 bpm. Without ST abnormalities.  4 episodes of SOB - NSR with PAC, rate 67 to 103 bpm.  1 episode of isolated "skipped beat" - NSR with PAC, rate 76 bpm.  Max heart rate 130, minimum HR of 50, and average HR of 72 bpm.    CONCLUSIONS   Multiple symptoms reported, frequent PACs noted, rare PVC detected.   Chest pain noted without ST changes.    Since DC occasional WEST, no CP, no dizziness. Quit smoking for past 3 weeks. Lots of CV risk factors. Last lipid panel in 8/2015, LDL 94, non-. Last A1C 6.3% in 6/2016.    In 10/2016, here post hospital visit. DCS - "admitted for syncope and symptomatic bradycardia. She was on a BB at admission which was stopped. She had no further symptoms and was stable throughout her admission. Patient was seen by cardiology and felt not to be a candidate for PPM placement. She was set up with an event monitor at time of discharge and F/U with cardiology."  Chart reviewed had bradycardia with rate down to 37 on Coreg 3.125 mg bid.  Reviewed by Dr. Driscoll - "IMPRESSION:  1. Syncopal episode could be due to drug-induced i.e., Coreg, possible    vasovagal.  2. Hypertension.  3. History of COPD.     RECOMMENDATIONS:  1. From cardiac standpoint, we would recommend to check for orthostatic    changes.  2. Event monitor recorder to rule out any arrhythmias. The patient needs to    follow up with Dr. Herbert.  3. To avoid heart rate slowing drugs, recommend to take off the Coreg and use    drugs, which do not " slow the heart rate.    DSE 6/2016 - EKG Conclusions:    1. The EKG portion of this study is negative for ischemia at a peak heart rate of 125 bpm (95% of predicted).   2. Blood pressure remained stable throughout the protocol  (Presenting BP: 147/87 Peak BP: 205/64).   3. The following arrhythmias were present: PVCs.   4. There were no symptoms of chest discomfort or significant dyspnea throughout the protocol.     ECHO CONCLUSIONS     1 - Concentric remodeling. the septum measuring 1.2 cm and the posterior wall measuring 1.3 cm across.    2 - Hyperdynamic left ventricular systolic function (EF 65-70%).     3 - Left ventricular diastolic dysfunction. E/e'(lat) is 19    4 - Normal right ventricular systolic function .     5 - Pulmonary hypertension. The estimated PA systolic pressure is 47 mmHg.     6 - Small pericardial effusion.     7 - No siginificant change from Echo in 10/2015.     No evidence of stress induced myocardial ischemia.     At home have good and bad (tired) days. Stay active, do own housework. Walking around the yard without problems. ECG shows NSR, PAC rate 73. Orthostatic VS shows no significant drop in BP and HR 72-75 bpm. Event monitor in place. No further near-syncope.     In 11/2016, no new problem. Home BP log reviewed 102-188/50-80, HR 60-85, on amlodipine 10 mg daily, 42% of readings with SBP > 150, mostly in the afternoon where 43 out of 55 readings with SBP >150. Problem with Coreg as noted. The only time with symptoms is when the BP readings are elevated. Have significant CKD stage III, eGFR 39 with marked anemia, Hgb 10.6 with iron deficiency, iron sat only 12% and ferritin at 42. On iron supplement bid.     In 12/2016, had recurrent syncope in October and November 2016. Noted constipation with the iron supplement, at times no BM for 3 days. Labs reviewed eGFR 31, have pre-renal azotemia BUN/Cr 34/1.7, Hgb 10.6. Report drinking about 80 oz daily, IOM recommendation for women is 91 oz  "daily.  ED note 11/11/2016 - "86 y.o. female with a pmhx of Anticoagulant long-term use; COPD; Diabetes mellitus type II; ; Hyperlipidemia; Hypertension; and Thyroid disease presenting to the E.D. with generalized weakness. Pt states she had a large bowel movement after being constipated recently and subsequently became dizzy upon standing up from the commode. She has had similar episodes of dizziness upon positional changes. Denies fever, chest pain, blood in stool, HA, visual changes, numbness, specific weakness. Past Surgical History: hysterectomy, appendectomy.     86 y.o. female presenting with episode of syncope preceded by positional change with onset of symptoms. Workup was undertaken based on patient's age. She is asymptomatic since arrival. She was given 500 cc normal saline solution IV. Upon standing following fluids, she feels well with no significant orthostasis or difficulty walking. Very low suspicion for ACS and I do not think cardiac biomarker is indicated. EKG reviewed with no sign of acute ischemic process or arrhythmia. No arrhythmia evident here on monitor. Per medical record reviewed with 30 day event monitor results showing no sign of significant arrhythmia. I did speak with her cardiologist Dr. Herbert who agreed with discharge and outpatient follow-up if patient is doing well with ambulation and no significant orthostatic symptoms. Patient has baseline renal insufficiency with creatinine similar to prior. She has a stable anemia. Low suspicion for other emergent process. Very low suspicion for emergent intracranial process. I had discussed extensively with family and patient has significant family support. They are comfortable taking her home. Outpatient PCP or cardiology follow-up recommended next week. Detailed return precautions reviewed."    In 2/2017, here post ED visit for nausea, weakness, tachycardia, and dehydration because did not drink 100 oz of fluid, improved with IVF. Since ED " remain active, no problem. Home /70. Worry about walking due to possible fall after being tripped by a dog, 17 years ago.     Review of Systems   Constitution: Positive for weakness and malaise/fatigue. Negative for diaphoresis, fever, night sweats and weight gain.   HENT: Positive for tinnitus. Negative for headaches and nosebleeds.    Eyes: Negative for visual disturbance.   Cardiovascular: Positive for claudication, dyspnea on exertion, irregular heartbeat, orthopnea, palpitations and paroxysmal nocturnal dyspnea. Negative for chest pain, cyanosis, leg swelling and near-syncope.   Respiratory: Positive for wheezing. Negative for cough, shortness of breath, sleep disturbances due to breathing and snoring.    Endocrine: Positive for polyuria. Negative for polydipsia.   Hematologic/Lymphatic: Does not bruise/bleed easily.   Skin: Negative for color change, flushing, nail changes, poor wound healing and suspicious lesions.   Musculoskeletal: Positive for arthritis, back pain, joint pain, neck pain and stiffness. Negative for falls, gout, joint swelling, muscle cramps, muscle weakness and myalgias.   Gastrointestinal: Positive for constipation. Negative for heartburn, hematemesis, hematochezia, melena and nausea.   Genitourinary: Positive for nocturia.   Neurological: Negative for disturbances in coordination, excessive daytime sleepiness, dizziness, focal weakness, light-headedness, loss of balance, numbness and vertigo.   Psychiatric/Behavioral: Positive for depression (lost son 3/21/2016, post heart surgery). Negative for substance abuse. The patient does not have insomnia and is not nervous/anxious.      [place] score 2       Objective:    Physical Exam   Constitutional: She is oriented to person, place, and time. She appears well-developed and well-nourished.   HENT:   Head: Normocephalic.   Eyes: Conjunctivae and EOM are normal. Pupils are equal, round, and reactive to light.   Neck: Normal range of  "motion. Neck supple. No JVD present. No thyromegaly present.   Circumference 15.25"   Cardiovascular: Normal rate, regular rhythm and intact distal pulses.  Exam reveals distant heart sounds. Exam reveals no gallop and no friction rub.    Murmur heard.   Medium-pitched machinery early systolic murmur is present with a grade of 2/6  at the upper right sternal border, upper left sternal border Varicose veins  Pulses:       Posterior tibial pulses are 1+ on the right side, and 1+ on the left side.   Pulmonary/Chest: Effort normal and breath sounds normal. She has no rales. She exhibits no tenderness.   Diminished breath sounds   Abdominal: Soft. Bowel sounds are normal. There is no tenderness.   Waist 36", hip 40.5"   Musculoskeletal: Normal range of motion. She exhibits no edema.   Lymphadenopathy:     She has no cervical adenopathy.   Neurological: She is alert and oriented to person, place, and time.   Skin: Skin is warm and dry. No rash noted.         Assessment:       1. Hypertension associated with diabetes    2. Hypertensive left ventricular hypertrophy, without heart failure    3. Dehydration         Plan:       Blaire was seen today for follow-up.    Diagnoses and all orders for this visit:    Hypertension associated with diabetes    Hypertensive left ventricular hypertrophy, without heart failure    Dehydration      Smoker, quit 5/2016, 25 pck-years    Abdominal obesity    - Instruction for Mediterranean diet and heart healthy exercise given.  - Need to drink 100 oz of fluid daily  - CV status stable, continue current Rx, all medications reviewed, patient acknowledge good understanding.  - Check home blood pressure, 2 days weekly, do 2 readings within 5 minutes in AM and PM, keep log for review.  - Need give POA to a family members  - Consider use of walking sticks for ambulation.  - Recommend at least biannual cardiovascular evaluation in view of her significant risk factors.    Patient Active Problem List "   Diagnosis    Diabetic neuropathy, type II diabetes mellitus    CKD (chronic kidney disease), stage III, eGFR 39, progressive 31    Hypothyroid    Hypertension associated with diabetes    Hyperlipidemia associated with type 2 diabetes mellitus    Type 2 diabetes mellitus with complication    Groin pain    Right carotid bruit    COPD (chronic obstructive pulmonary disease)    Anxiety    Tobacco dependence in remission    Abdominal aortic aneurysm without rupture    Hip arthritis    Degenerative spinal arthritis    Osteoporosis    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 10/2016, 11/2016    Body mass index (BMI) 23.0-23.9, adult, today 24.1    Iron deficiency anemia due to chronic blood loss    Proteinuria due to type 2 diabetes mellitus    History of syncope in childhood    Prerenal azotemia    Drug-induced constipation     Total face-to-face time with the patient was 25 minutes and greater than 50% was spent in counseling and coordination of care. The above assessment and plan have been discussed at length. Labs and procedure over the last 6 months reviewed. Problem List updated. Asked to bring in all active medications / pills bottles with next visit.

## 2017-02-21 DIAGNOSIS — I71.40 ABDOMINAL ANEURYSM WITHOUT MENTION OF RUPTURE: Primary | ICD-10-CM

## 2017-02-24 ENCOUNTER — HOSPITAL ENCOUNTER (EMERGENCY)
Facility: HOSPITAL | Age: 82
Discharge: HOME | End: 2017-02-24
Attending: EMERGENCY MEDICINE
Payer: MEDICARE

## 2017-02-24 VITALS
DIASTOLIC BLOOD PRESSURE: 75 MMHG | HEART RATE: 70 BPM | BODY MASS INDEX: 24.1 KG/M2 | WEIGHT: 136 LBS | SYSTOLIC BLOOD PRESSURE: 166 MMHG | HEIGHT: 63 IN | RESPIRATION RATE: 18 BRPM | TEMPERATURE: 98 F | OXYGEN SATURATION: 97 %

## 2017-02-24 DIAGNOSIS — R05.9 COUGH: ICD-10-CM

## 2017-02-24 DIAGNOSIS — E86.0 DEHYDRATION: Primary | ICD-10-CM

## 2017-02-24 LAB
ALBUMIN SERPL BCP-MCNC: 3.3 G/DL
ALP SERPL-CCNC: 75 U/L
ALT SERPL W/O P-5'-P-CCNC: 17 U/L
ANION GAP SERPL CALC-SCNC: 9 MMOL/L
AST SERPL-CCNC: 23 U/L
BACTERIA #/AREA URNS HPF: ABNORMAL /HPF
BASOPHILS # BLD AUTO: 0 K/UL
BASOPHILS NFR BLD: 0.5 %
BILIRUB SERPL-MCNC: 0.2 MG/DL
BILIRUB UR QL STRIP: NEGATIVE
BUN SERPL-MCNC: 36 MG/DL
CALCIUM SERPL-MCNC: 9.1 MG/DL
CHLORIDE SERPL-SCNC: 104 MMOL/L
CLARITY UR: CLEAR
CO2 SERPL-SCNC: 26 MMOL/L
COLOR UR: YELLOW
CREAT SERPL-MCNC: 1.5 MG/DL
DIFFERENTIAL METHOD: ABNORMAL
EOSINOPHIL # BLD AUTO: 0.2 K/UL
EOSINOPHIL NFR BLD: 4 %
ERYTHROCYTE [DISTWIDTH] IN BLOOD BY AUTOMATED COUNT: 13.7 %
EST. GFR  (AFRICAN AMERICAN): 36 ML/MIN/1.73 M^2
EST. GFR  (NON AFRICAN AMERICAN): 31 ML/MIN/1.73 M^2
GLUCOSE SERPL-MCNC: 123 MG/DL
GLUCOSE UR QL STRIP: NEGATIVE
HCT VFR BLD AUTO: 30.8 %
HGB BLD-MCNC: 10.4 G/DL
HGB UR QL STRIP: NEGATIVE
HYALINE CASTS #/AREA URNS LPF: 0 /LPF
KETONES UR QL STRIP: NEGATIVE
LEUKOCYTE ESTERASE UR QL STRIP: ABNORMAL
LYMPHOCYTES # BLD AUTO: 2 K/UL
LYMPHOCYTES NFR BLD: 32.7 %
MAGNESIUM SERPL-MCNC: 2.2 MG/DL
MCH RBC QN AUTO: 31.6 PG
MCHC RBC AUTO-ENTMCNC: 33.7 %
MCV RBC AUTO: 94 FL
MICROSCOPIC COMMENT: ABNORMAL
MONOCYTES # BLD AUTO: 0.5 K/UL
MONOCYTES NFR BLD: 8.6 %
NEUTROPHILS # BLD AUTO: 3.2 K/UL
NEUTROPHILS NFR BLD: 54.2 %
NITRITE UR QL STRIP: NEGATIVE
PH UR STRIP: 7 [PH] (ref 5–8)
PLATELET # BLD AUTO: 178 K/UL
PMV BLD AUTO: 8.7 FL
POTASSIUM SERPL-SCNC: 4.2 MMOL/L
PROT SERPL-MCNC: 6.3 G/DL
PROT UR QL STRIP: ABNORMAL
RBC # BLD AUTO: 3.28 M/UL
RBC #/AREA URNS HPF: 2 /HPF (ref 0–4)
SODIUM SERPL-SCNC: 139 MMOL/L
SP GR UR STRIP: <=1.005 (ref 1–1.03)
SQUAMOUS #/AREA URNS HPF: 3 /HPF
URN SPEC COLLECT METH UR: ABNORMAL
UROBILINOGEN UR STRIP-ACNC: NEGATIVE EU/DL
WBC # BLD AUTO: 6 K/UL
WBC #/AREA URNS HPF: 21 /HPF (ref 0–5)

## 2017-02-24 PROCEDURE — 80053 COMPREHEN METABOLIC PANEL: CPT

## 2017-02-24 PROCEDURE — 93005 ELECTROCARDIOGRAM TRACING: CPT

## 2017-02-24 PROCEDURE — 96360 HYDRATION IV INFUSION INIT: CPT

## 2017-02-24 PROCEDURE — 99284 EMERGENCY DEPT VISIT MOD MDM: CPT | Mod: 25

## 2017-02-24 PROCEDURE — 85025 COMPLETE CBC W/AUTO DIFF WBC: CPT

## 2017-02-24 PROCEDURE — 25000003 PHARM REV CODE 250: Performed by: EMERGENCY MEDICINE

## 2017-02-24 PROCEDURE — 36415 COLL VENOUS BLD VENIPUNCTURE: CPT

## 2017-02-24 PROCEDURE — 83735 ASSAY OF MAGNESIUM: CPT

## 2017-02-24 PROCEDURE — 81000 URINALYSIS NONAUTO W/SCOPE: CPT

## 2017-02-24 RX ORDER — BENZONATATE 100 MG/1
100 CAPSULE ORAL 3 TIMES DAILY PRN
Qty: 20 CAPSULE | Refills: 0 | Status: SHIPPED | OUTPATIENT
Start: 2017-02-24 | End: 2017-03-06

## 2017-02-24 RX ADMIN — SODIUM CHLORIDE 1000 ML: 0.9 INJECTION, SOLUTION INTRAVENOUS at 07:02

## 2017-02-24 NOTE — ED AVS SNAPSHOT
[facility] [address]  [place] [address]               Blaire Cage   2017  5:52 PM   ED    Description:  Female : 1930   Department:  [facility]           Your Care was Coordinated By:     Provider Role From To    Miguel Melara MD Attending Provider 17 0625 --      Reason for Visit     Dizziness     Fatigue     Palpitations           Diagnoses this Visit        Comments    Dehydration    -  Primary     Cough           ED Disposition     None           To Do List           Follow-up Information     Schedule an appointment as soon as possible for a visit in 3 days to follow up.    Why:  with PCP      [facility]     [facility] -  Assistance  Registered nurses in the [facility] provide clinical advisement, health education, appointment booking, and other advisory services.  Call for this free service at [phone].             Medications           Message regarding Medications     Verify the changes and/or additions to your medication regime listed below are the same as discussed with your clinician today.  If any of these changes or additions are incorrect, please notify your healthcare provider.        These medications were administered today        Dose Freq    sodium chloride 0.9% bolus 1,000 mL 1,000 mL ED 1 Time    Sig: Inject 1,000 mLs into the vein ED 1 Time.    Class: Normal    Route: Intravenous           Verify that the below list of medications is an accurate representation of the medications you are currently taking.  If none reported, the list may be blank. If incorrect, please contact your healthcare provider. Carry this list with you in case of emergency.           Current Medications     albuterol-ipratropium 2.5mg-0.5mg/3mL (DUO-NEB) 0.5 mg-3 mg(2.5 mg base)/3 mL nebulizer solution USE ONE AMPULE IN NEBULIZER EVERY 6 HOURS AS NEEDED FOR  WHEEZING     amlodipine (NORVASC) 10 MG tablet Take 1 tablet (10 mg total) by mouth once daily.    arformoterol (BROVANA) 15 mcg/2 mL Nebu Take 2 mLs (15 mcg total) by nebulization 2 (two) times daily.    ascorbic acid (VITAMIN C) 500 MG tablet Take 500 mg by mouth once daily.      aspirin (ECOTRIN) 81 MG EC tablet Take 81 mg by mouth once daily.      blood sugar diagnostic Strp 1 strip by Misc.(Non-Drug; Combo Route) route 2 (two) times daily.    calcium carbonate (OS-TASIA) 500 mg calcium (1,250 mg) tablet Take 1 tablet by mouth 2 (two) times daily.      citalopram (CELEXA) 10 MG tablet Take 1 tablet (10 mg total) by mouth once daily.    CLEVER CHEK BLOOD GLUCOSE SYST kit     docusate sodium (COLACE) 100 MG capsule Take 1 capsule (100 mg total) by mouth 2 (two) times daily.    ferrous sulfate 325 mg (65 mg iron) Tab tablet Take 325 mg by mouth daily with breakfast.    fish oil-omega-3 fatty acids 300-1,000 mg capsule Take 2 g by mouth every evening.     fluticasone (FLONASE) 50 mcg/actuation nasal spray 1 spray by Each Nare route once daily.    gabapentin (NEURONTIN) 100 MG capsule Take 1 capsule (100 mg total) by mouth every evening. Take 200 mg qhs X 1 week then 100 mg qhs    guanfacine (TENEX) 1 MG Tab Take 1 tablet (1 mg total) by mouth every evening.    lancets (ULTRA THIN LANCETS) 28 gauge [facility] 1 lancet by Subdermal route 2 (two) times daily.    levalbuterol (XOPENEX) 1.25 mg/0.5 mL nebulizer solution Take 0.5 mLs (1.25 mg total) by nebulization every 8 (eight) hours as needed.    levothyroxine (SYNTHROID) 75 MCG tablet Take 1 tablet (75 mcg total) by mouth once daily.    magnesium oxide (MAG-OX) 400 mg tablet take by mouth once daily    nitroGLYCERIN (NITROSTAT) 0.4 MG SL tablet Place 1 tablet (0.4 mg total) under the tongue every 5 (five) minutes as needed for Chest pain. As needed    ondansetron (ZOFRAN-ODT) 4 MG TbDL Take 1 tablet (4 mg total) by mouth every 8 (eight) hours as needed. Every 8 hours as needed for  nausea/vomiting    simvastatin (ZOCOR) 40 MG tablet Take 1 tablet (40 mg total) by mouth every evening.    vitamin D 1000 units Tab Take 1 tablet (1,000 Units total) by mouth once daily.           Clinical Reference Information           Your Vitals Were     BP                   152/67           Allergies as of 2/24/2017        Reactions    Carvedilol Other (See Comments)    Bradycardia and syncope    Boniva [Ibandronate]     Codeine     Hydralazine Analogues     Iodinated Contrast Media - Iv Dye Hives    Lisinopril     Morphine       Immunizations Administered on Date of Encounter - 2/24/2017     None      ED Micro, Lab, POCT     Start Ordered       Status Ordering Provider    02/24/17 1826 02/24/17 1825  Comprehensive Metabolic Panel (CMP)  STAT      Final result     02/24/17 1826 02/24/17 1825  Complete Blood Count (CBC)  STAT      Final result     02/24/17 1826 02/24/17 1825  Urinalysis  STAT      Final result     02/24/17 1826 02/24/17 1825  Magnesium  STAT      Final result     02/24/17 1825 02/24/17 1825  Urinalysis Microscopic  Once      Final result       ED Imaging Orders     Start Ordered       Status Ordering Provider    02/24/17 1826 02/24/17 1825  X-Ray Chest PA And Lateral  1 time imaging      In process         Discharge Instructions         Dehydration    The human body is comprised largely of water. If you lose more fluids than you take in, you can become dehydrated. This means there are not enough fluids in your body for it to function right. Mild dehydration can cause weakness, confusion, or muscle cramps. In extreme cases, it can lead to brain damage and even death. That's why prompt treatment is crucial.  Risk factors  Anyone can become dehydrated. But infants, children, and older adults are at greatest risk. You are most likely to lose fluids with severe vomiting, diarrhea, or a fever. Exercising or working hard--especially in hot weather--can also cause excess fluid loss.  What to  do  Drinking liquids is the best way to prevent dehydration. Water is best, but juice or frozen pops can also help. Your doctor may suggest electrolyte solutions for sick infants and young children.  When to go to the emergency room (ER)  Go to an ER right away for these symptoms:  Adults  · Very dark urine and little urine output  · Dizziness, weakness, confusion, fainting  Children  · Sunken eyes  · Little or no urine output (for infants, no wet diaper in 8 hours)  · Very dark urine  · Skin that doesn't bounce back quickly when pinched  · Crying without tears  What to expect in the ER  Your blood pressure, temperature, and heart rate will be checked. You may have blood or urine tests. The main treatment for dehydration is fluids. You may be given these to drink. Or, you may receive them through a vein in your arm. You also may be treated for diarrhea, vomiting, or a high fever.   Date Last Reviewed: 7/18/2015  © 4696-8190 [employer]. [address] [place] [address]. All rights reserved. This information is not intended as a substitute for professional medical care. Always follow your healthcare professional's instructions.          Your Scheduled Appointments     Mar 16, 2017 10:15 AM CDT   Non-Fasting Lab with LAB, SLIDELL SAT   [facility]    [address] [facility] [address] [phone]            Mar 16, 2017  1:00 PM CDT   Established Patient Visit with MD David Avelar - Hematology Oncology ([facility])    [address] [place] [address] [address]  [facility] [address]   [phone]            Apr 11, 2017 10:40 AM CDT   Established Patient Visit with LUIS Vinsonll - Podiatry ([facility])    [address]  [facility] [address] [phone]            May 22, 2017  3:00 PM CDT   Established Patient Visit with MD Rose Marie Isabelll - Family Medicine ([facility])    [address]  [facility] [address]    [phone]              [facility] Sign-Up     Activating your [facility] account is as easy as 1-2-3!     1) Visit [email or web address], select Sign Up Now, enter this activation code and your date of birth, then select Next.  Activation code not generated  Current Patient Portal Status: Account disabled      2) Create a username and password to use when you visit [facility] in the future and select a security question in case you lose your password and select Next.    3) Enter your e-mail address and click Sign Up!    Additional Information  If you have questions, please e-mail [email or web address] or call [phone] to talk to our [facility] staff. Remember, [facility] is NOT to be used for urgent needs. For medical emergencies, dial 911.         Smoking Cessation     If you would like to quit smoking:   You may be eligible for free services if you are a [place] resident and started smoking cigarettes before September 1, 1988.  Call the Smoking Cessation Trust ([facility]) toll free at (278) 037-6863 or (084) 175-4026.   Call 9-494-QUIT-NOW if you do not meet the above criteria.             [facility] complies with applicable Federal civil rights laws and does not discriminate on the basis of race, color, national origin, age, disability, or sex.        Language Assistance Services     ATTENTION: Language assistance services are available, free of charge. Please call [phone].      ATENCIÓN: Si habla español, tiene a mir disposición servicios gratuitos de asistencia lingüística. Llame al [phone].     CHÚ Ý: N?u b?n nói Ti?ng Vi?t, có các d?ch v? h? tr? ngôn ng? mi?n phí dành cho b?n. G?i s? [phone].

## 2017-02-25 NOTE — ED NOTES
Presents to the ER with c/o fatigue that started this morning. Associated complaints are palpitations nausea, productive cough, swelling to bilateral feet,and  tingling to bilateral lower extremities  Mucous membranes are pink and moist. Skin is warm, dry and intact. Lungs are clear bilaterally, respirations are regular and unlabored. Denies, congestion, rhinorrhea or SOB. BS active x4, no tenderness with palpation, abd is soft and not distended. Denies any appetite or activity change. S1S2, capillary refill is < 2 seconds. Denies dysuria, difficulty urinating, frequency, numbness, tingling or weakness. KELLIE MERRITT

## 2017-02-25 NOTE — ED NOTES
Upon discharge, patient is AAOx4, no cardiac or respiratory complications. Follow up care and  Medications have been reviewed with patient and has been instructed to return to the ER if needed. Patient verbalized understanding and ambulated to the lobby without difficulty. RENÉ HOPKINS.

## 2017-02-25 NOTE — ED PROVIDER NOTES
"Encounter Date: 2/24/2017    SCRIBE #1 NOTE: IJeni, am scribing for, and in the presence of, Dr Melara.       History     Chief Complaint   Patient presents with    Dizziness    Fatigue    Palpitations     Review of patient's allergies indicates:   Allergen Reactions    Carvedilol Other (See Comments)     Bradycardia and syncope    Boniva [ibandronate]     Codeine     Hydralazine analogues     Iodinated contrast media - iv dye Hives    Lisinopril     Morphine      HPI Comments: 02/24/2017  6:20 PM     Chief Complaint: Dizziness      Blaire Cage is a 86 y.o. female with a pmhx of Anticoagulant long-term use; COPD; Diabetes mellitus type II; Encounter for blood transfusion; Hip arthritis (3/1/2016); Hyperlipidemia; Hypertension; and Thyroid disease presenting to the E.D. with an acute onset of dizziness which has been ongoing since this morning. She reports associated symptoms of fatigue, "fluttering to chest," productive cough, nausea, paresthesias to lower extremities below knees and swelling to bilateral feet. Denies fever, hematuria, blood in stool. Pt has a past surgical history that includes Hysterectomy; Appendectomy; Varicose vein surgery; Fracture surgery; and Hernia repair.      The history is provided by the patient.     Past Medical History:   Diagnosis Date    Anticoagulant long-term use     aspirin    COPD (chronic obstructive pulmonary disease)     COPD with acute exacerbation 1/9/2015    Diabetes mellitus type II     Encounter for blood transfusion     Hip arthritis 3/1/2016    Hyperlipidemia     Hypertension     Thyroid disease     hypothyroid     Past Surgical History:   Procedure Laterality Date    APPENDECTOMY      FRACTURE SURGERY      right hip     HERNIA REPAIR      groin    HYSTERECTOMY      VARICOSE VEIN SURGERY       Family History   Problem Relation Age of Onset    Hypertension Mother     Diabetes Sister     Diabetes Brother     Kidney disease " Son      Social History   Substance Use Topics    Smoking status: Former Smoker     Years: 44.00     Types: Cigarettes     Quit date: 4/30/2015    Smokeless tobacco: Never Used      Comment: 1/08/2015    Alcohol use No     Review of Systems   Constitutional: Positive for fatigue. Negative for fever.   HENT: Negative for sore throat.    Eyes: Negative for visual disturbance.   Respiratory: Positive for cough.    Cardiovascular: Positive for palpitations and leg swelling. Negative for chest pain.   Gastrointestinal: Positive for nausea. Negative for abdominal pain, diarrhea and vomiting.   Genitourinary: Negative for difficulty urinating and pelvic pain.   Musculoskeletal: Negative for arthralgias.   Skin: Negative for rash.   Neurological: Positive for dizziness. Negative for weakness.       Physical Exam   Initial Vitals   BP Pulse Resp Temp SpO2   02/24/17 1749 02/24/17 1749 02/24/17 1749 02/24/17 1749 02/24/17 1749   188/75 72 18 98.4 °F (36.9 °C) 97 %     Physical Exam    Nursing note and vitals reviewed.  Constitutional: She appears well-developed.   HENT:   Head: Normocephalic and atraumatic.   Mouth/Throat: Oropharynx is clear and moist.   Eyes: Conjunctivae are normal.   Neck: Neck supple.   Cardiovascular: Normal rate, regular rhythm, normal heart sounds and intact distal pulses. Exam reveals no gallop and no friction rub.    No murmur heard.  Pulmonary/Chest: Breath sounds normal. She has no wheezes. She has no rhonchi. She has no rales.   Abdominal: Soft. She exhibits no distension. There is no tenderness.   Musculoskeletal: Normal range of motion.   Neurological: She is alert and oriented to person, place, and time.   Skin: No rash noted. No erythema.   Psychiatric: She has a normal mood and affect.         ED Course   Procedures  Labs Reviewed - No data to display          Medical Decision Making:   Initial Assessment:   86 year old female presented with a chief complaint of lightheadedness.     Differential Diagnosis:   DDx includes dehydration, pneumonia, electrolyte abnormality, cardiac arrhythmia.   Clinical Tests:   Lab Tests: Ordered and Reviewed  Radiological Study: Ordered and Reviewed  ED Management:  The patient was emergently evaluated in the ED, her evaluation was significant for an elderly female with a normal lung exam.  The patient's EKG showed no acute abnormalities per my independent interpretation.   The patient's labs were significant for a slightly worsening of her kidney function.  The patient is likely mildly dehydrated.  The patient was aggressively treated with IV fluids in the ED, with resolution of her symptoms.  She is stable for discharge to home.  She'll be discharged home to increase her fluid intake and to follow-up with her PCP for further care.             Scribe Attestation:   Scribe #1: I performed the above scribed service and the documentation accurately describes the services I performed. I attest to the accuracy of the note.    Attending Attestation:           Physician Attestation for Scribe:  Physician Attestation Statement for Scribe #1: I, Dr Melara, reviewed documentation, as scribed by Jeni Rae in my presence, and it is both accurate and complete.                 ED Course     Clinical Impression:   The primary encounter diagnosis was Dehydration. A diagnosis of Cough was also pertinent to this visit.          Miguel Melara MD  02/26/17 3553

## 2017-03-16 ENCOUNTER — OFFICE VISIT (OUTPATIENT)
Dept: HEMATOLOGY/ONCOLOGY | Facility: CLINIC | Age: 82
End: 2017-03-16
Attending: INTERNAL MEDICINE
Payer: MEDICARE

## 2017-03-16 ENCOUNTER — LAB VISIT (OUTPATIENT)
Dept: LAB | Facility: HOSPITAL | Age: 82
End: 2017-03-16
Attending: INTERNAL MEDICINE
Payer: MEDICARE

## 2017-03-16 VITALS
WEIGHT: 133.19 LBS | SYSTOLIC BLOOD PRESSURE: 160 MMHG | RESPIRATION RATE: 18 BRPM | HEIGHT: 63 IN | TEMPERATURE: 98 F | HEART RATE: 75 BPM | DIASTOLIC BLOOD PRESSURE: 67 MMHG | BODY MASS INDEX: 23.6 KG/M2

## 2017-03-16 DIAGNOSIS — E03.9 HYPOTHYROIDISM, UNSPECIFIED TYPE: ICD-10-CM

## 2017-03-16 DIAGNOSIS — M81.0 OSTEOPOROSIS, UNSPECIFIED: Primary | ICD-10-CM

## 2017-03-16 DIAGNOSIS — E11.8 TYPE 2 DIABETES MELLITUS WITH COMPLICATION, WITHOUT LONG-TERM CURRENT USE OF INSULIN: ICD-10-CM

## 2017-03-16 DIAGNOSIS — M81.0 OSTEOPOROSIS: Primary | ICD-10-CM

## 2017-03-16 DIAGNOSIS — N18.30 CKD (CHRONIC KIDNEY DISEASE), STAGE III: ICD-10-CM

## 2017-03-16 DIAGNOSIS — K59.00 CONSTIPATION, UNSPECIFIED CONSTIPATION TYPE: ICD-10-CM

## 2017-03-16 DIAGNOSIS — M47.819 DEGENERATIVE SPINAL ARTHRITIS: ICD-10-CM

## 2017-03-16 DIAGNOSIS — D64.9 ANEMIA, UNSPECIFIED TYPE: ICD-10-CM

## 2017-03-16 DIAGNOSIS — M81.0 OSTEOPOROSIS, UNSPECIFIED: ICD-10-CM

## 2017-03-16 LAB
ALBUMIN SERPL BCP-MCNC: 3.2 G/DL
ALP SERPL-CCNC: 68 U/L
ALT SERPL W/O P-5'-P-CCNC: 20 U/L
ANION GAP SERPL CALC-SCNC: 7 MMOL/L
AST SERPL-CCNC: 21 U/L
BILIRUB SERPL-MCNC: 0.4 MG/DL
BUN SERPL-MCNC: 30 MG/DL
CALCIUM SERPL-MCNC: 9 MG/DL
CHLORIDE SERPL-SCNC: 104 MMOL/L
CO2 SERPL-SCNC: 28 MMOL/L
CREAT SERPL-MCNC: 1.4 MG/DL
EST. GFR  (AFRICAN AMERICAN): 39.2 ML/MIN/1.73 M^2
EST. GFR  (NON AFRICAN AMERICAN): 34 ML/MIN/1.73 M^2
GLUCOSE SERPL-MCNC: 113 MG/DL
POTASSIUM SERPL-SCNC: 4.7 MMOL/L
PROT SERPL-MCNC: 6.5 G/DL
SODIUM SERPL-SCNC: 139 MMOL/L

## 2017-03-16 PROCEDURE — 1159F MED LIST DOCD IN RCRD: CPT | Mod: S$GLB,,, | Performed by: INTERNAL MEDICINE

## 2017-03-16 PROCEDURE — 99214 OFFICE O/P EST MOD 30 MIN: CPT | Mod: S$GLB,,, | Performed by: INTERNAL MEDICINE

## 2017-03-16 PROCEDURE — 36415 COLL VENOUS BLD VENIPUNCTURE: CPT | Mod: PO

## 2017-03-16 PROCEDURE — 99499 UNLISTED E&M SERVICE: CPT | Mod: S$PBB,,, | Performed by: INTERNAL MEDICINE

## 2017-03-16 PROCEDURE — 1126F AMNT PAIN NOTED NONE PRSNT: CPT | Mod: S$GLB,,, | Performed by: INTERNAL MEDICINE

## 2017-03-16 PROCEDURE — 1157F ADVNC CARE PLAN IN RCRD: CPT | Mod: S$GLB,,, | Performed by: INTERNAL MEDICINE

## 2017-03-16 PROCEDURE — 99999 PR PBB SHADOW E&M-EST. PATIENT-LVL III: CPT | Mod: PBBFAC,,, | Performed by: INTERNAL MEDICINE

## 2017-03-16 PROCEDURE — 80053 COMPREHEN METABOLIC PANEL: CPT

## 2017-03-16 NOTE — MR AVS SNAPSHOT
[facility]  [address] [place] [address] Drive [address]  [facility] [address]  Phone: [phone]                  Blaire Cage   3/16/2017 1:00 PM   Office Visit    Description:  Female : 1930   Provider:  Trinity Carr MD   Department:  [facility]           Reason for Visit     Follow-up     Results           Diagnoses this Visit        Comments    Osteoporosis    -  Primary     Type 2 diabetes mellitus with complication, without long-term current use of insulin         CKD (chronic kidney disease), stage III         Hypothyroidism, unspecified type         Degenerative spinal arthritis         Anemia, unspecified type         Constipation, unspecified constipation type                To Do List           Future Appointments        Provider Department Dept Phone    2017 10:40 AM Derrick Hendricks DPM [facility] - Podiatry [phone]    2017 3:00 PM Eli Edmonds MD [facility] - Family Medicine [phone]    9/15/2017 9:00 AM [facility] [phone]    9/15/2017 11:00 AM Trinity Carr MD [facility] H[facility]ology [facility] [phone]      Goals (5 Years of Data)     None      Follow-Up and Disposition     Return in about 6 months (around 2017).    Follow-up and Disposition History      [facility]     [facility] - 24/7 Assistance  Registered nurses in the [facility] provide clinical advisement, health education, appointment booking, and other advisory services.  Call for this free service at [phone].             Medications           Message regarding Medications     Verify the changes and/or additions to your medication regime listed below are the same as discussed with your clinician today.  If any of these changes or additions are incorrect, please notify your healthcare provider.             Verify that the below list of medications is an accurate representation of the  medications you are currently taking.  If none reported, the list may be blank. If incorrect, please contact your healthcare provider. Carry this list with you in case of emergency.           Current Medications     albuterol-ipratropium 2.5mg-0.5mg/3mL (DUO-NEB) 0.5 mg-3 mg(2.5 mg base)/3 mL nebulizer solution USE ONE AMPULE IN NEBULIZER EVERY 6 HOURS AS NEEDED FOR  WHEEZING    amlodipine (NORVASC) 10 MG tablet Take 1 tablet (10 mg total) by mouth once daily.    arformoterol (BROVANA) 15 mcg/2 mL Nebu Take 2 mLs (15 mcg total) by nebulization 2 (two) times daily.    ascorbic acid (VITAMIN C) 500 MG tablet Take 500 mg by mouth once daily.      aspirin (ECOTRIN) 81 MG EC tablet Take 81 mg by mouth once daily.      blood sugar diagnostic Strp 1 strip by Misc.(Non-Drug; Combo Route) route 2 (two) times daily.    calcium carbonate (OS-TASIA) 500 mg calcium (1,250 mg) tablet Take 1 tablet by mouth 2 (two) times daily.      citalopram (CELEXA) 10 MG tablet Take 1 tablet (10 mg total) by mouth once daily.    CLEVER CHEK BLOOD GLUCOSE SYST kit     docusate sodium (COLACE) 100 MG capsule Take 1 capsule (100 mg total) by mouth 2 (two) times daily.    ferrous sulfate 325 mg (65 mg iron) Tab tablet Take 325 mg by mouth daily with breakfast.    fish oil-omega-3 fatty acids 300-1,000 mg capsule Take 2 g by mouth every evening.     fluticasone (FLONASE) 50 mcg/actuation nasal spray 1 spray by Each Nare route once daily.    gabapentin (NEURONTIN) 100 MG capsule Take 1 capsule (100 mg total) by mouth every evening. Take 200 mg qhs X 1 week then 100 mg qhs    guanfacine (TENEX) 1 MG Tab Take 1 tablet (1 mg total) by mouth every evening.    lancets (ULTRA THIN LANCETS) 28 gauge [facility] 1 lancet by Subdermal route 2 (two) times daily.    levalbuterol (XOPENEX) 1.25 mg/0.5 mL nebulizer solution Take 0.5 mLs (1.25 mg total) by nebulization every 8 (eight) hours as needed.    levothyroxine (SYNTHROID) 75 MCG tablet Take 1 tablet (75 mcg total)  "by mouth once daily.    magnesium oxide (MAG-OX) 400 mg tablet take by mouth once daily    nitroGLYCERIN (NITROSTAT) 0.4 MG SL tablet Place 1 tablet (0.4 mg total) under the tongue every 5 (five) minutes as needed for Chest pain. As needed    ondansetron (ZOFRAN-ODT) 4 MG TbDL Take 1 tablet (4 mg total) by mouth every 8 (eight) hours as needed. Every 8 hours as needed for nausea/vomiting    simvastatin (ZOCOR) 40 MG tablet Take 1 tablet (40 mg total) by mouth every evening.    vitamin D 1000 units Tab Take 1 tablet (1,000 Units total) by mouth once daily.           Clinical Reference Information           Your Vitals Were     BP Pulse Temp Resp Height Weight    160/67 75 97.9 °F (36.6 °C) 18 5' 3" (1.6 m) 60.4 kg (133 lb 2.5 oz)    Last Period BMI             (LMP Unknown) 23.59 kg/m2         Blood Pressure          Most Recent Value    BP  (!)  160/67      Allergies as of 3/16/2017     Carvedilol    Boniva [Ibandronate]    Codeine    Hydralazine Analogues    Iodinated Contrast Media - Iv Dye    Lisinopril    Morphine      Immunizations Administered on Date of Encounter - 3/16/2017     None      [facility] Sign-Up     Activating your [facility] account is as easy as 1-2-3!     1) Visit [email or web address], select Sign Up Now, enter this activation code and your date of birth, then select Next.  Activation code not generated  Current Patient Portal Status: Account disabled      2) Create a username and password to use when you visit [facility] in the future and select a security question in case you lose your password and select Next.    3) Enter your e-mail address and click Sign Up!    Additional Information  If you have questions, please e-mail [email or web address] or call [phone] to talk to our [facility] staff. Remember, [facility] is NOT to be used for urgent needs. For medical emergencies, dial 911.         Language Assistance Services     ATTENTION: Language assistance services are available, free of charge. " Please call [phone].      ATENCIÓN: Si habla español, tiene a mir disposición servicios gratuitos de asistencia lingüística. Llame al [phone].     CHÚ Ý: N?u b?n nói Ti?ng Vi?t, có các d?ch v? h? tr? ngôn ng? mi?n phí dành cho b?n. G?i s? [phone].         [facility] complies with applicable Federal civil rights laws and does not discriminate on the basis of race, color, national origin, age, disability, or sex.

## 2017-03-16 NOTE — PROGRESS NOTES
Subjective:       Patient ID: Blaire Cage is a 86 y.o. female.    Chief Complaint: Follow-up and Results (labs)    HPI   Pt is here with a dexa scan revealing osteoporosis. Pt used to take Boniva but was unable to tolerate this medication. She is tolerating calcium with magnesium and vitamin D.   She is tolerating medication for hypertension, diabetes and dyslipidemia.  Currently she is not having any pain and no evidence of infection.  She does have a history of chronic kidney disease.  Pt here for clearance for prolia  Pt has had to go to ER multiple times with dehydration    Past Medical History:   Diagnosis Date    Anticoagulant long-term use     aspirin    COPD (chronic obstructive pulmonary disease)     COPD with acute exacerbation 1/9/2015    Diabetes mellitus type II     Encounter for blood transfusion     Hip arthritis 3/1/2016    Hyperlipidemia     Hypertension     Thyroid disease     hypothyroid       Current Outpatient Prescriptions:     albuterol-ipratropium 2.5mg-0.5mg/3mL (DUO-NEB) 0.5 mg-3 mg(2.5 mg base)/3 mL nebulizer solution, USE ONE AMPULE IN NEBULIZER EVERY 6 HOURS AS NEEDED FOR  WHEEZING, Disp: 180 mL, Rfl: 0    amlodipine (NORVASC) 10 MG tablet, Take 1 tablet (10 mg total) by mouth once daily., Disp: 90 tablet, Rfl: 3    arformoterol (BROVANA) 15 mcg/2 mL Nebu, Take 2 mLs (15 mcg total) by nebulization 2 (two) times daily., Disp: 60 vial, Rfl: 6    ascorbic acid (VITAMIN C) 500 MG tablet, Take 500 mg by mouth once daily.  , Disp: , Rfl:     aspirin (ECOTRIN) 81 MG EC tablet, Take 81 mg by mouth once daily.  , Disp: , Rfl:     blood sugar diagnostic Strp, 1 strip by Misc.(Non-Drug; Combo Route) route 2 (two) times daily., Disp: 100 each, Rfl: 5    calcium carbonate (OS-TASIA) 500 mg calcium (1,250 mg) tablet, Take 1 tablet by mouth 2 (two) times daily.  , Disp: , Rfl:     citalopram (CELEXA) 10 MG tablet, Take 1 tablet (10 mg total) by mouth once daily., Disp: 30 tablet,  Rfl: 4    CLEVER CHEK BLOOD GLUCOSE SYST kit, , Disp: , Rfl:     docusate sodium (COLACE) 100 MG capsule, Take 1 capsule (100 mg total) by mouth 2 (two) times daily., Disp: 60 capsule, Rfl: 11    ferrous sulfate 325 mg (65 mg iron) Tab tablet, Take 325 mg by mouth daily with breakfast., Disp: , Rfl:     fish oil-omega-3 fatty acids 300-1,000 mg capsule, Take 2 g by mouth every evening. , Disp: , Rfl:     fluticasone (FLONASE) 50 mcg/actuation nasal spray, 1 spray by Each Nare route once daily. (Patient taking differently: 1 spray by Each Nare route daily as needed. ), Disp: 16 g, Rfl: 0    gabapentin (NEURONTIN) 100 MG capsule, Take 1 capsule (100 mg total) by mouth every evening. Take 200 mg qhs X 1 week then 100 mg qhs, Disp: 45 capsule, Rfl: 5    guanfacine (TENEX) 1 MG Tab, Take 1 tablet (1 mg total) by mouth every evening., Disp: 30 tablet, Rfl: 11    lancets (ULTRA THIN LANCETS) 28 gauge Misc, 1 lancet by Subdermal route 2 (two) times daily., Disp: 180 each, Rfl: 3    levalbuterol (XOPENEX) 1.25 mg/0.5 mL nebulizer solution, Take 0.5 mLs (1.25 mg total) by nebulization every 8 (eight) hours as needed., Disp: 25 each, Rfl: 2    levothyroxine (SYNTHROID) 75 MCG tablet, Take 1 tablet (75 mcg total) by mouth once daily., Disp: 90 tablet, Rfl: 3    magnesium oxide (MAG-OX) 400 mg tablet, take by mouth once daily, Disp: 90 tablet, Rfl: 2    nitroGLYCERIN (NITROSTAT) 0.4 MG SL tablet, Place 1 tablet (0.4 mg total) under the tongue every 5 (five) minutes as needed for Chest pain. As needed, Disp: 30 tablet, Rfl: 3    ondansetron (ZOFRAN-ODT) 4 MG TbDL, Take 1 tablet (4 mg total) by mouth every 8 (eight) hours as needed. Every 8 hours as needed for nausea/vomiting, Disp: 20 tablet, Rfl: 0    simvastatin (ZOCOR) 40 MG tablet, Take 1 tablet (40 mg total) by mouth every evening., Disp: 90 tablet, Rfl: 0    vitamin D 1000 units Tab, Take 1 tablet (1,000 Units total) by mouth once daily., Disp: 90 tablet,  "Rfl: 3  Past Surgical History:   Procedure Laterality Date    APPENDECTOMY      FRACTURE SURGERY      right hip     HERNIA REPAIR      groin    HYSTERECTOMY      VARICOSE VEIN SURGERY       Review of patient's allergies indicates:   Allergen Reactions    Carvedilol Other (See Comments)     Bradycardia and syncope    Boniva [ibandronate]     Codeine     Hydralazine analogues     Iodinated contrast media - iv dye Hives    Lisinopril     Morphine      Social History   Substance Use Topics    Smoking status: Former Smoker     Years: 44.00     Types: Cigarettes     Quit date: 4/30/2015    Smokeless tobacco: Never Used      Comment: 1/08/2015    Alcohol use No     Family History   Problem Relation Age of Onset    Hypertension Mother     Diabetes Sister     Diabetes Brother     Kidney disease Son          All medications and past history have been reviewed.  Review of Systems    CONSTITUTIONAL: No fevers, chills, night sweats, wt. loss, appetite changes  SKIN: ++ tenting   ENT: No headaches, head trauma, vision changes, or eye pain  LYMPH NODES: None noticed   CV: No chest pain, palpitations.   RESP: No dyspnea on exertion, cough, wheezing, or hemoptysis  GI: No nausea, emesis, diarrhea,  ++ constipation,no   melena, hematochezia, pain.   : No dysuria, hematuria, urgency, or frequency   HEME: No easy bruising, bleeding problems  PSYCHIATRIC: No depression, anxiety, psychosis, hallucinations.  NEURO: No seizures, memory loss, dizziness or difficulty sleeping    Objective:     BP (!) 160/67  Pulse 75  Temp 97.9 °F (36.6 °C)  Resp 18  Ht 5' 3" (1.6 m)  Wt 60.4 kg (133 lb 2.5 oz)  LMP  (LMP Unknown)  BMI 23.59 kg/m2    Physical Exam    Gen: NAD, A and O x3,   Psych: pleasant affect, normal thought process  Eyes: Pupils round and non dilated, EOM intact  Nose: Nares patent  OP clear, mucosa patent  Neck: suppple, no JVD, trachea midline  Lungs: CTAB,  ++  wheezes, no use of accessory muscles  CV: " S1S2 with RRR  Abd: soft, NTND, + BS, No HSM, no ascites  Extr: No CCE, DARIO, strength normal, good capillary refill  Neuro: CNs grossly intact  Skin: + tenting  Rheum: No joint swelling  All lab results and imaging results have been reviewed and discussed with the patient    CMP  Sodium   Date Value Ref Range Status   02/24/2017 139 136 - 145 mmol/L Final     Potassium   Date Value Ref Range Status   02/24/2017 4.2 3.5 - 5.1 mmol/L Final     Chloride   Date Value Ref Range Status   02/24/2017 104 95 - 110 mmol/L Final     CO2   Date Value Ref Range Status   02/24/2017 26 23 - 29 mmol/L Final     Glucose   Date Value Ref Range Status   02/24/2017 123 (H) 70 - 110 mg/dL Final     BUN, Bld   Date Value Ref Range Status   02/24/2017 36 (H) 8 - 23 mg/dL Final     Creatinine   Date Value Ref Range Status   02/24/2017 1.5 (H) 0.5 - 1.4 mg/dL Final   03/04/2013 1.1 0.5 - 1.4 mg/dL Final     Calcium   Date Value Ref Range Status   02/24/2017 9.1 8.7 - 10.5 mg/dL Final   03/04/2013 9.8 8.7 - 10.5 mg/dL Final     Total Protein   Date Value Ref Range Status   02/24/2017 6.3 6.0 - 8.4 g/dL Final     Albumin   Date Value Ref Range Status   02/24/2017 3.3 (L) 3.5 - 5.2 g/dL Final     Total Bilirubin   Date Value Ref Range Status   02/24/2017 0.2 0.1 - 1.0 mg/dL Final     Comment:     For infants and newborns, interpretation of results should be based  on gestational age, weight and in agreement with clinical  observations.  Premature Infant recommended reference ranges:  Up to 24 hours.............<8.0 mg/dL  Up to 48 hours............<12.0 mg/dL  3-5 days..................<15.0 mg/dL  6-29 days.................<15.0 mg/dL       Alkaline Phosphatase   Date Value Ref Range Status   02/24/2017 75 55 - 135 U/L Final   03/04/2013 60 55 - 135 U/L Final     AST   Date Value Ref Range Status   02/24/2017 23 10 - 40 U/L Final   03/04/2013 21 10 - 40 U/L Final     ALT   Date Value Ref Range Status   02/24/2017 17 10 - 44 U/L Final      Anion Gap   Date Value Ref Range Status   02/24/2017 9 8 - 16 mmol/L Final   03/04/2013 10 5 - 15 meq/L Final     eGFR if    Date Value Ref Range Status   02/24/2017 36 (A) >60 mL/min/1.73 m^2 Final     eGFR if non    Date Value Ref Range Status   02/24/2017 31 (A) >60 mL/min/1.73 m^2 Final     Comment:     Calculation used to obtain the estimated glomerular filtration  rate (eGFR) is the CKD-EPI equation. Since race is unknown   in our information system, the eGFR values for   -American and Non--American patients are given   for each creatinine result.         Assessment:       1. Osteoporosis    2. Type 2 diabetes mellitus with complication, without long-term current use of insulin    3. CKD (chronic kidney disease), stage III, eGFR 39, progressive 31    4. Hypothyroidism, unspecified type    5. Degenerative spinal arthritis    6. Anemia, unspecified type    7. Constipation, unspecified constipation type        Plan:       CLEARED for prolia  The patient would greatly benefit from Prolia every 6 months. Pt understands her renal function  No dose adjustment  She is to continue her usual medication for hypertension as well as diabetes and dyslipidemia.  Benefits and adverse effects from Prolia have been explained.  Cont current meds for Diabetic neuropathy and hypothyroidism  Cont iron for anemia   Add senekot S for constipation  Educated pt on hydration to add powerade zero or propel in addition to water    Current Outpatient Prescriptions:     albuterol-ipratropium 2.5mg-0.5mg/3mL (DUO-NEB) 0.5 mg-3 mg(2.5 mg base)/3 mL nebulizer solution, USE ONE AMPULE IN NEBULIZER EVERY 6 HOURS AS NEEDED FOR  WHEEZING, Disp: 180 mL, Rfl: 0    amlodipine (NORVASC) 10 MG tablet, Take 1 tablet (10 mg total) by mouth once daily., Disp: 90 tablet, Rfl: 3    arformoterol (BROVANA) 15 mcg/2 mL Nebu, Take 2 mLs (15 mcg total) by nebulization 2 (two) times daily., Disp: 60 vial, Rfl:  6    ascorbic acid (VITAMIN C) 500 MG tablet, Take 500 mg by mouth once daily.  , Disp: , Rfl:     aspirin (ECOTRIN) 81 MG EC tablet, Take 81 mg by mouth once daily.  , Disp: , Rfl:     blood sugar diagnostic Strp, 1 strip by Misc.(Non-Drug; Combo Route) route 2 (two) times daily., Disp: 100 each, Rfl: 5    calcium carbonate (OS-TASIA) 500 mg calcium (1,250 mg) tablet, Take 1 tablet by mouth 2 (two) times daily.  , Disp: , Rfl:     citalopram (CELEXA) 10 MG tablet, Take 1 tablet (10 mg total) by mouth once daily., Disp: 30 tablet, Rfl: 4    CLEVER CHEK BLOOD GLUCOSE SYST kit, , Disp: , Rfl:     docusate sodium (COLACE) 100 MG capsule, Take 1 capsule (100 mg total) by mouth 2 (two) times daily., Disp: 60 capsule, Rfl: 11    ferrous sulfate 325 mg (65 mg iron) Tab tablet, Take 325 mg by mouth daily with breakfast., Disp: , Rfl:     fish oil-omega-3 fatty acids 300-1,000 mg capsule, Take 2 g by mouth every evening. , Disp: , Rfl:     fluticasone (FLONASE) 50 mcg/actuation nasal spray, 1 spray by Each Nare route once daily. (Patient taking differently: 1 spray by Each Nare route daily as needed. ), Disp: 16 g, Rfl: 0    gabapentin (NEURONTIN) 100 MG capsule, Take 1 capsule (100 mg total) by mouth every evening. Take 200 mg qhs X 1 week then 100 mg qhs, Disp: 45 capsule, Rfl: 5    guanfacine (TENEX) 1 MG Tab, Take 1 tablet (1 mg total) by mouth every evening., Disp: 30 tablet, Rfl: 11    lancets (ULTRA THIN LANCETS) 28 gauge [facility], 1 lancet by Subdermal route 2 (two) times daily., Disp: 180 each, Rfl: 3    levalbuterol (XOPENEX) 1.25 mg/0.5 mL nebulizer solution, Take 0.5 mLs (1.25 mg total) by nebulization every 8 (eight) hours as needed., Disp: 25 each, Rfl: 2    levothyroxine (SYNTHROID) 75 MCG tablet, Take 1 tablet (75 mcg total) by mouth once daily., Disp: 90 tablet, Rfl: 3    magnesium oxide (MAG-OX) 400 mg tablet, take by mouth once daily, Disp: 90 tablet, Rfl: 2    nitroGLYCERIN (NITROSTAT) 0.4 MG  SL tablet, Place 1 tablet (0.4 mg total) under the tongue every 5 (five) minutes as needed for Chest pain. As needed, Disp: 30 tablet, Rfl: 3    ondansetron (ZOFRAN-ODT) 4 MG TbDL, Take 1 tablet (4 mg total) by mouth every 8 (eight) hours as needed. Every 8 hours as needed for nausea/vomiting, Disp: 20 tablet, Rfl: 0    simvastatin (ZOCOR) 40 MG tablet, Take 1 tablet (40 mg total) by mouth every evening., Disp: 90 tablet, Rfl: 0    vitamin D 1000 units Tab, Take 1 tablet (1,000 Units total) by mouth once daily., Disp: 90 tablet, Rfl: 3          Thank you for allowing me to participate in this pleasant patient's care. Please call with any questions or concerns.

## 2017-03-16 NOTE — LETTER
March 16, 2017      Luis Manuel Mauricio MD  [address]  [facility] [address]           [facility]  [address] [place] [address] [address]  [facility] [address]  Phone: [phone]          Patient: Blaire Cage   MR Number: [id number]   YOB: 1930   Date of Visit: 3/16/2017       Dear Dr. Luis Manuel Mauricio:    Thank you for referring Blaire Cage to me for evaluation. Attached you will find relevant portions of my assessment and plan of care.    If you have questions, please do not hesitate to call me. I look forward to following Blaire Cage along with you.    Sincerely,    Trinity Carr MD    Enclosure  CC:  No Recipients    If you would like to receive this communication electronically, please contact [email or web address] or [phone] to request more information on [employer] access.    For providers and/or their staff who would like to refer a patient to [facility], please contact us through our one-stop-shop provider referral line, [facility] [place], at [phone].    If you feel you have received this communication in error or would no longer like to receive these types of communications, please e-mail [email or web address]

## 2017-03-20 ENCOUNTER — DOCUMENTATION ONLY (OUTPATIENT)
Dept: HEMATOLOGY/ONCOLOGY | Facility: CLINIC | Age: 82
End: 2017-03-20

## 2017-03-22 ENCOUNTER — DOCUMENTATION ONLY (OUTPATIENT)
Dept: HEMATOLOGY/ONCOLOGY | Facility: CLINIC | Age: 82
End: 2017-03-22

## 2017-03-22 RX ORDER — IPRATROPIUM BROMIDE AND ALBUTEROL SULFATE 2.5; .5 MG/3ML; MG/3ML
SOLUTION RESPIRATORY (INHALATION)
Qty: 180 ML | Refills: 0 | Status: SHIPPED | OUTPATIENT
Start: 2017-03-22 | End: 2017-05-22 | Stop reason: SDUPTHER

## 2017-03-23 ENCOUNTER — TELEPHONE (OUTPATIENT)
Dept: HEMATOLOGY/ONCOLOGY | Facility: CLINIC | Age: 82
End: 2017-03-23

## 2017-03-23 NOTE — TELEPHONE ENCOUNTER
Patient called and message left for patient to call clinic back at [phone] in regards to questions.

## 2017-03-23 NOTE — TELEPHONE ENCOUNTER
----- Message from Dorcas Davis sent at 3/23/2017 12:35 PM CDT -----  Contact: self  Patient [phone] is calling to discuss a Prolia injection that she states she received this past Monday 03 20 17/please call

## 2017-03-24 ENCOUNTER — TELEPHONE (OUTPATIENT)
Dept: HEMATOLOGY/ONCOLOGY | Facility: CLINIC | Age: 82
End: 2017-03-24

## 2017-03-24 NOTE — TELEPHONE ENCOUNTER
----- Message from Asia Hayes sent at 3/24/2017 11:58 AM CDT -----  Contact:   call //[phone]    Calling to  Speak to the  Nurse // please call  ,    Pt missed call  When  Going to  Bathroom // please call

## 2017-03-24 NOTE — TELEPHONE ENCOUNTER
----- Message from Edith Roach sent at 3/24/2017  8:50 AM CDT -----  Patient is returning nurse's call from yesterday/please call patient back at [phone].

## 2017-03-24 NOTE — TELEPHONE ENCOUNTER
Patient stated that she had a lot of bone pain after her last injection but is doing fine now after walking and working in the yard. Patient instructed to call back if symptoms occur again and if she needs to come in to see Dr. Carr.

## 2017-03-29 ENCOUNTER — OFFICE VISIT (OUTPATIENT)
Dept: FAMILY MEDICINE | Facility: CLINIC | Age: 82
End: 2017-03-29
Payer: MEDICARE

## 2017-03-29 VITALS
DIASTOLIC BLOOD PRESSURE: 50 MMHG | TEMPERATURE: 98 F | BODY MASS INDEX: 24.18 KG/M2 | HEIGHT: 63 IN | HEART RATE: 67 BPM | OXYGEN SATURATION: 97 % | SYSTOLIC BLOOD PRESSURE: 140 MMHG | WEIGHT: 136.44 LBS

## 2017-03-29 DIAGNOSIS — E11.59 HYPERTENSION ASSOCIATED WITH DIABETES: ICD-10-CM

## 2017-03-29 DIAGNOSIS — E11.8 TYPE 2 DIABETES MELLITUS WITH COMPLICATION, WITHOUT LONG-TERM CURRENT USE OF INSULIN: ICD-10-CM

## 2017-03-29 DIAGNOSIS — R52 BODY ACHES: ICD-10-CM

## 2017-03-29 DIAGNOSIS — T50.905A MEDICATION REACTION, INITIAL ENCOUNTER: Primary | ICD-10-CM

## 2017-03-29 DIAGNOSIS — I15.2 HYPERTENSION ASSOCIATED WITH DIABETES: ICD-10-CM

## 2017-03-29 DIAGNOSIS — R14.2 BELCHING: ICD-10-CM

## 2017-03-29 PROCEDURE — 1159F MED LIST DOCD IN RCRD: CPT | Mod: S$GLB,,, | Performed by: PHYSICIAN ASSISTANT

## 2017-03-29 PROCEDURE — 1160F RVW MEDS BY RX/DR IN RCRD: CPT | Mod: S$GLB,,, | Performed by: PHYSICIAN ASSISTANT

## 2017-03-29 PROCEDURE — 99499 UNLISTED E&M SERVICE: CPT | Mod: S$PBB,,, | Performed by: PHYSICIAN ASSISTANT

## 2017-03-29 PROCEDURE — 99213 OFFICE O/P EST LOW 20 MIN: CPT | Mod: S$GLB,,, | Performed by: PHYSICIAN ASSISTANT

## 2017-03-29 PROCEDURE — 1125F AMNT PAIN NOTED PAIN PRSNT: CPT | Mod: S$GLB,,, | Performed by: PHYSICIAN ASSISTANT

## 2017-03-29 PROCEDURE — 1157F ADVNC CARE PLAN IN RCRD: CPT | Mod: S$GLB,,, | Performed by: PHYSICIAN ASSISTANT

## 2017-03-29 PROCEDURE — 99999 PR PBB SHADOW E&M-EST. PATIENT-LVL V: CPT | Mod: PBBFAC,,, | Performed by: PHYSICIAN ASSISTANT

## 2017-03-29 NOTE — PROGRESS NOTES
Subjective:       Patient ID: Blaire Cage is a 86 y.o. female.    Chief Complaint: bone pain, belching    HPI   Patient is an 86 year old female presenting to the clinic with complaint of multi-joint pain, abnormal fatigue since getting prolia injection on 3/20/17. Patient reports pain in b/l shoulders, neck, b/l hips, thoracic spine that was 10/10 last Monday/Tuesday, but is gradually improving & is approximately 8/10 today. She has not been taking anything for pain. She reports she also had somewhat of a decreased appetite after the injection, but this is better. She also admits to frequent belching & reflux symptoms. She has not tried to take anything for this. Her vitals are stable. She denies fever, chills, sweats, bruising, cough, sinus coplaints, belly pain. She was going to follow-up with Dr. Carr, but was concerned about the co-pay of $40.   Review of Systems   Constitutional: Positive for activity change, appetite change and fatigue. Negative for chills and fever.   HENT: Negative for congestion, ear pain, postnasal drip, rhinorrhea and sinus pressure.    Respiratory: Negative for cough, shortness of breath and wheezing.    Cardiovascular: Negative for chest pain and palpitations.   Gastrointestinal: Negative for abdominal pain, constipation, diarrhea, nausea and vomiting.        Belching   Genitourinary: Negative for frequency, hematuria and urgency.   Musculoskeletal: Positive for arthralgias and myalgias. Negative for back pain, gait problem, neck pain and neck stiffness.   Skin: Negative for rash.   Neurological: Negative for dizziness, syncope, weakness, light-headedness and headaches.   Psychiatric/Behavioral: Negative for agitation and confusion.       Objective:      Physical Exam   Constitutional: Vital signs are normal. She appears well-developed and well-nourished. No distress.   Cardiovascular: Normal rate, regular rhythm, S1 normal, S2 normal and normal heart sounds.  Exam reveals no  gallop.    No murmur heard.  Pulses:       Radial pulses are 2+ on the right side, and 2+ on the left side.   <2sec cap refill fingers bilat     Pulmonary/Chest: Effort normal and breath sounds normal. No respiratory distress. She has no wheezes. She has no rhonchi.   Musculoskeletal:        Right shoulder: She exhibits normal range of motion, no tenderness, no bony tenderness and no crepitus.        Left shoulder: She exhibits normal range of motion, no tenderness, no bony tenderness and no crepitus.        Right hip: She exhibits tenderness.        Left hip: She exhibits tenderness.        Right knee: She exhibits normal range of motion. No tenderness found. No medial joint line and no lateral joint line tenderness noted.        Left knee: Normal. She exhibits normal range of motion. No tenderness found. No medial joint line and no lateral joint line tenderness noted.   Skin: Skin is warm and dry. She is not diaphoretic.   Appropriate skin turgor   Psychiatric: She has a normal mood and affect. Her speech is normal and behavior is normal. Judgment and thought content normal. Cognition and memory are normal.       Assessment:       1. Medication reaction, initial encounter    2. Body aches    3. Belching    4. Hypertension associated with diabetes    5. Type 2 diabetes mellitus with complication, without long-term current use of insulin        Plan:   Blaire was seen today for bone pain, belching.    Diagnoses and all orders for this visit:    Medication reaction, initial encounter  I suspect body aches/pain may be related to prolia injection due to timing of pains & improvement the further away from the injection    Body aches  Patient not wanting any type of pain medication  She will try arthritic stregnth tylenol as directed  She will let us know if symptoms persist or do not improve    Belching  Patient will try Zantac 150mg BID OTC  Suggested PRN use    Hypertension associated with diabetes  Good control;  continue current bp medications    Type 2 diabetes mellitus with complication, without long-term current use of insulin  Patient readiness: acceptance and barriers:none    During the course of the visit the patient was educated and counseled about the following:     Diabetes:  Discussed general issues about diabetes pathophysiology and management.  Hypertension:   Medication: no change.    Goals: Diabetes: Maintain Hemoglobin A1C below 7 and Hypertension: Reduce Blood Pressure    Did patient meet goals/outcomes: No    The following self management tools provided: declined    Patient Instructions (the written plan) was given to the patient/family.     Time spent with patient: 20 minutes

## 2017-03-29 NOTE — MR AVS SNAPSHOT
[facility]  [address] [address]  Phone: [phone]  Fax: [phone]                  Blaire aCge   3/29/2017 3:00 PM   Office Visit    Description:  Female : 1930   Provider:  ELLIE Flores   Department:  [place] [facility]           Reason for Visit     bone pain, belching                To Do List           Future Appointments        Provider Department Dept Phone    2017 10:40 AM Derrick Hendricks DPM [facility] [phone]    2017 3:00 PM Eli Edmonds MD [facility] [phone]    9/15/2017 9:00 AM [facility] [phone]    9/15/2017 11:00 AM Trinity Carr MD [facility] [phone]      Goals (5 Years of Data)     None      [facility]     [facility] Care [facility] -  Assistance  Registered nurses in the [facility] provide clinical advisement, health education, appointment booking, and other advisory services.  Call for this free service at [phone].             Medications           Message regarding Medications     Verify the changes and/or additions to your medication regime listed below are the same as discussed with your clinician today.  If any of these changes or additions are incorrect, please notify your healthcare provider.             Verify that the below list of medications is an accurate representation of the medications you are currently taking.  If none reported, the list may be blank. If incorrect, please contact your healthcare provider. Carry this list with you in case of emergency.           Current Medications     albuterol-ipratropium 2.5mg-0.5mg/3mL (DUO-NEB) 0.5 mg-3 mg(2.5 mg base)/3 mL nebulizer solution USE ONE AMPULE IN NEBULIZER EVERY 6 HOURS AS NEEDED FOR WHEEZING    amlodipine (NORVASC) 10 MG tablet Take 1 tablet (10 mg total) by mouth once daily.    arformoterol  (BROVANA) 15 mcg/2 mL Nebu Take 2 mLs (15 mcg total) by nebulization 2 (two) times daily.    ascorbic acid (VITAMIN C) 500 MG tablet Take 500 mg by mouth once daily.      aspirin (ECOTRIN) 81 MG EC tablet Take 81 mg by mouth once daily.      blood sugar diagnostic Strp 1 strip by Misc.(Non-Drug; Combo Route) route 2 (two) times daily.    calcium carbonate (OS-TASIA) 500 mg calcium (1,250 mg) tablet Take 1 tablet by mouth 2 (two) times daily.      citalopram (CELEXA) 10 MG tablet Take 1 tablet (10 mg total) by mouth once daily.    CLEVER CHEK BLOOD GLUCOSE SYST kit     docusate sodium (COLACE) 100 MG capsule Take 1 capsule (100 mg total) by mouth 2 (two) times daily.    ferrous sulfate 325 mg (65 mg iron) Tab tablet Take 325 mg by mouth daily with breakfast.    fish oil-omega-3 fatty acids 300-1,000 mg capsule Take 2 g by mouth every evening.     fluticasone (FLONASE) 50 mcg/actuation nasal spray 1 spray by Each Nare route once daily.    gabapentin (NEURONTIN) 100 MG capsule Take 1 capsule (100 mg total) by mouth every evening. Take 200 mg qhs X 1 week then 100 mg qhs    guanfacine (TENEX) 1 MG Tab Take 1 tablet (1 mg total) by mouth every evening.    lancets (ULTRA THIN LANCETS) 28 gauge [facility] 1 lancet by Subdermal route 2 (two) times daily.    levothyroxine (SYNTHROID) 75 MCG tablet Take 1 tablet (75 mcg total) by mouth once daily.    magnesium oxide (MAG-OX) 400 mg tablet take by mouth once daily    nitroGLYCERIN (NITROSTAT) 0.4 MG SL tablet Place 1 tablet (0.4 mg total) under the tongue every 5 (five) minutes as needed for Chest pain. As needed    ondansetron (ZOFRAN-ODT) 4 MG TbDL Take 1 tablet (4 mg total) by mouth every 8 (eight) hours as needed. Every 8 hours as needed for nausea/vomiting    simvastatin (ZOCOR) 40 MG tablet Take 1 tablet (40 mg total) by mouth every evening.    vitamin D 1000 units Tab Take 1 tablet (1,000 Units total) by mouth once daily.           Clinical Reference Information           Your  "Vitals Were     BP Pulse Temp Height Weight Last Period    140/50 (BP Location: Right arm, Patient Position: Sitting, BP Method: Automatic) 67 98.2 °F (36.8 °C) (Oral) 5' 3" (1.6 m) 61.9 kg (136 lb 7.4 oz) (LMP Unknown)    SpO2 BMI             97% 24.17 kg/m2         Blood Pressure          Most Recent Value    BP  (!)  140/50      Allergies as of 3/29/2017     Carvedilol    Boniva [Ibandronate]    Codeine    Hydralazine Analogues    Iodinated Contrast Media - Iv Dye    Lisinopril    Morphine      Immunizations Administered on Date of Encounter - 3/29/2017     None      [facility] Sign-Up     Activating your [facility] account is as easy as 1-2-3!     1) Visit [email or web address], select Sign Up Now, enter this activation code and your date of birth, then select Next.  Activation code not generated  Current Patient Portal Status: Account disabled      2) Create a username and password to use when you visit [facility] in the future and select a security question in case you lose your password and select Next.    3) Enter your e-mail address and click Sign Up!    Additional Information  If you have questions, please e-mail [email or web address] or call [phone] to talk to our [facility] staff. Remember, [facility] is NOT to be used for urgent needs. For medical emergencies, dial 911.         Instructions      Acetaminophen Oral tablet, extended-release  What is this medicine?  ACETAMINOPHEN (a set a ANGLE jacqueline fen) is a pain reliever. It is used to treat mild pain and fever.  How should I use this medicine?  Take this medicine by mouth with a glass of water. Follow the directions on the package or prescription label. Do not cut, crush or chew this medicine. Take your medicine at regular intervals. Do not take your medicine more often than directed.  Talk to your pediatrician regarding the use of this medicine in children. While this drug may be prescribed for children as young as 12 years of age for selected conditions, " precautions do apply.  What side effects may I notice from receiving this medicine?  Side effects that you should report to your doctor or health care professional as soon as possible:  · allergic reactions like skin rash, itching or hives, swelling of the face, lips, or tongue  · breathing problems  · fever or sore throat  · redness, blistering, peeling or loosening of the skin, including inside the mouth  · trouble passing urine or change in the amount of urine  · unusual bleeding or bruising  · unusually weak or tired  · yellowing of the eyes or skin  Side effects that usually do not require medical attention (report to your doctor or health care professional if they continue or are bothersome):  · headache  · nausea, vomiting  What may interact with this medicine?  · alcohol  · imatinib  · isoniazid  · other medicines with acetaminophen  What if I miss a dose?  If you miss a dose, take it as soon as you can. If it is almost time for your next dose, take only that dose. Do not take double or extra doses.  Where should I keep my medicine?  Keep out of reach of children.  Store at room temperature between 20 and 25 degrees C (68 and 77 degrees F). Protect from moisture and heat. Throw away any unused medicine after the expiration date.  What should I tell my health care provider before I take this medicine?  They need to know if you have any of these conditions:  · if you often drink alcohol  · liver disease  · an unusual or allergic reaction to acetaminophen, other medicines, foods, dyes, or preservatives  · pregnant or trying to get pregnant  · breast-feeding  What should I watch for while using this medicine?  Tell your doctor or health care professional if the pain lasts more than 10 days (5 days for children), if it gets worse, or if there is a new or different kind of pain. Also, check with your doctor if a fever lasts for more than 3 days.  Do not take other medicines that contain acetaminophen with this  medicine. Always read labels carefully. If you have questions, ask your doctor or pharmacist.  If you take too much acetaminophen get medical help right away. Too much acetaminophen can be very dangerous and cause liver damage. Even if you do not have symptoms, it is important to get help right away.  Date Last Reviewed:   NOTE:This sheet is a summary. It may not cover all possible information. If you have questions about this medicine, talk to your doctor, pharmacist, or health care provider. Copyright© 2016 [employer]             Language Assistance Services     ATTENTION: Language assistance services are available, free of charge. Please call [phone].      ATENCIÓN: Si lazarusla anne, tiene a mir disposición servicios gratuitos de asistencia lingüística. Llame al [phone].     CHÚ Ý: N?u b?n nói Ti?ng Vi?t, có các d?ch v? h? tr? ngôn ng? mi?n phí dành cho b?n. G?i s? [phone].         [facility] complies with applicable Federal civil rights laws and does not discriminate on the basis of race, color, national origin, age, disability, or sex.

## 2017-03-29 NOTE — PATIENT INSTRUCTIONS
Acetaminophen Oral tablet, extended-release  What is this medicine?  ACETAMINOPHEN (a set a ANGLE jacqueline fen) is a pain reliever. It is used to treat mild pain and fever.  How should I use this medicine?  Take this medicine by mouth with a glass of water. Follow the directions on the package or prescription label. Do not cut, crush or chew this medicine. Take your medicine at regular intervals. Do not take your medicine more often than directed.  Talk to your pediatrician regarding the use of this medicine in children. While this drug may be prescribed for children as young as 12 years of age for selected conditions, precautions do apply.  What side effects may I notice from receiving this medicine?  Side effects that you should report to your doctor or health care professional as soon as possible:  · allergic reactions like skin rash, itching or hives, swelling of the face, lips, or tongue  · breathing problems  · fever or sore throat  · redness, blistering, peeling or loosening of the skin, including inside the mouth  · trouble passing urine or change in the amount of urine  · unusual bleeding or bruising  · unusually weak or tired  · yellowing of the eyes or skin  Side effects that usually do not require medical attention (report to your doctor or health care professional if they continue or are bothersome):  · headache  · nausea, vomiting  What may interact with this medicine?  · alcohol  · imatinib  · isoniazid  · other medicines with acetaminophen  What if I miss a dose?  If you miss a dose, take it as soon as you can. If it is almost time for your next dose, take only that dose. Do not take double or extra doses.  Where should I keep my medicine?  Keep out of reach of children.  Store at room temperature between 20 and 25 degrees C (68 and 77 degrees F). Protect from moisture and heat. Throw away any unused medicine after the expiration date.  What should I tell my health care provider before I take this  medicine?  They need to know if you have any of these conditions:  · if you often drink alcohol  · liver disease  · an unusual or allergic reaction to acetaminophen, other medicines, foods, dyes, or preservatives  · pregnant or trying to get pregnant  · breast-feeding  What should I watch for while using this medicine?  Tell your doctor or health care professional if the pain lasts more than 10 days (5 days for children), if it gets worse, or if there is a new or different kind of pain. Also, check with your doctor if a fever lasts for more than 3 days.  Do not take other medicines that contain acetaminophen with this medicine. Always read labels carefully. If you have questions, ask your doctor or pharmacist.  If you take too much acetaminophen get medical help right away. Too much acetaminophen can be very dangerous and cause liver damage. Even if you do not have symptoms, it is important to get help right away.  Date Last Reviewed:   NOTE:This sheet is a summary. It may not cover all possible information. If you have questions about this medicine, talk to your doctor, pharmacist, or health care provider. Copyright© 2016 [employer]

## 2017-04-11 ENCOUNTER — OFFICE VISIT (OUTPATIENT)
Dept: PODIATRY | Facility: CLINIC | Age: 82
End: 2017-04-11
Payer: MEDICARE

## 2017-04-11 VITALS — BODY MASS INDEX: 24.02 KG/M2 | WEIGHT: 135.56 LBS | HEIGHT: 63 IN

## 2017-04-11 DIAGNOSIS — M20.42 HAMMER TOES OF BOTH FEET: ICD-10-CM

## 2017-04-11 DIAGNOSIS — B35.1 ONYCHOMYCOSIS DUE TO DERMATOPHYTE: ICD-10-CM

## 2017-04-11 DIAGNOSIS — M20.41 HAMMER TOES OF BOTH FEET: ICD-10-CM

## 2017-04-11 DIAGNOSIS — E11.42 DIABETIC POLYNEUROPATHY ASSOCIATED WITH TYPE 2 DIABETES MELLITUS: Primary | ICD-10-CM

## 2017-04-11 PROCEDURE — 1157F ADVNC CARE PLAN IN RCRD: CPT | Mod: S$GLB,,, | Performed by: PODIATRIST

## 2017-04-11 PROCEDURE — 99499 UNLISTED E&M SERVICE: CPT | Mod: S$PBB,,, | Performed by: PODIATRIST

## 2017-04-11 PROCEDURE — 11055 PARING/CUTG B9 HYPRKER LES 1: CPT | Mod: Q9,S$GLB,, | Performed by: PODIATRIST

## 2017-04-11 PROCEDURE — 1160F RVW MEDS BY RX/DR IN RCRD: CPT | Mod: S$GLB,,, | Performed by: PODIATRIST

## 2017-04-11 PROCEDURE — 99214 OFFICE O/P EST MOD 30 MIN: CPT | Mod: 25,S$GLB,, | Performed by: PODIATRIST

## 2017-04-11 PROCEDURE — 99999 PR PBB SHADOW E&M-EST. PATIENT-LVL IV: CPT | Mod: PBBFAC,,, | Performed by: PODIATRIST

## 2017-04-11 PROCEDURE — 1159F MED LIST DOCD IN RCRD: CPT | Mod: S$GLB,,, | Performed by: PODIATRIST

## 2017-04-11 PROCEDURE — 11721 DEBRIDE NAIL 6 OR MORE: CPT | Mod: 59,Q9,S$GLB, | Performed by: PODIATRIST

## 2017-04-11 PROCEDURE — 1126F AMNT PAIN NOTED NONE PRSNT: CPT | Mod: S$GLB,,, | Performed by: PODIATRIST

## 2017-04-11 NOTE — MR AVS SNAPSHOT
[facility]  [address] [address]  Phone: [phone]                  Blaire Cage   2017 10:40 AM   Office Visit    Description:  Female : 1930   Provider:  Derrick Hendricks DPM   Department:  [facility]           Reason for Visit     Diabetic Foot Exam     Nail Care     Callouses           Diagnoses this Visit        Comments    Diabetic polyneuropathy associated with type 2 diabetes mellitus    -  Primary     Onychomycosis due to dermatophyte         Hammer toes of both feet                To Do List           Future Appointments        Provider Department Dept Phone    2017 3:00 PM Eli Edmonds MD [facility] [phone]    2017 10:20 AM Derrick Hendricks DPM [facility] [phone]    9/15/2017 9:00 AM [facility] [phone]    9/15/2017 11:00 AM Tirnity Carr MD [facility] [phone]      Goals (5 Years of Data)     None      Follow-Up and Disposition     Return in about 3 months (around 2017).      [facility]     [facility] Care [facility] -  Assistance  Unless otherwise directed by your provider, please contact [facility], our nurse care line that is available for  assistance.     Registered nurses in the [facility] provide: appointment scheduling, clinical advisement, health education, and other advisory services.  Call: [phone] (toll free)               Medications           Message regarding Medications     Verify the changes and/or additions to your medication regime listed below are the same as discussed with your clinician today.  If any of these changes or additions are incorrect, please notify your healthcare provider.             Verify that the below list of medications is an accurate representation of the medications you are currently taking.  If none reported, the list may be blank.  If incorrect, please contact your healthcare provider. Carry this list with you in case of emergency.           Current Medications     albuterol-ipratropium 2.5mg-0.5mg/3mL (DUO-NEB) 0.5 mg-3 mg(2.5 mg base)/3 mL nebulizer solution USE ONE AMPULE IN NEBULIZER EVERY 6 HOURS AS NEEDED FOR WHEEZING    amlodipine (NORVASC) 10 MG tablet Take 1 tablet (10 mg total) by mouth once daily.    arformoterol (BROVANA) 15 mcg/2 mL Nebu Take 2 mLs (15 mcg total) by nebulization 2 (two) times daily.    ascorbic acid (VITAMIN C) 500 MG tablet Take 500 mg by mouth once daily.      aspirin (ECOTRIN) 81 MG EC tablet Take 81 mg by mouth once daily.      blood sugar diagnostic Strp 1 strip by Misc.(Non-Drug; Combo Route) route 2 (two) times daily.    calcium carbonate (OS-TASIA) 500 mg calcium (1,250 mg) tablet Take 1 tablet by mouth 2 (two) times daily.      citalopram (CELEXA) 10 MG tablet Take 1 tablet (10 mg total) by mouth once daily.    CLEVER CHEK BLOOD GLUCOSE SYST kit     docusate sodium (COLACE) 100 MG capsule Take 1 capsule (100 mg total) by mouth 2 (two) times daily.    ferrous sulfate 325 mg (65 mg iron) Tab tablet Take 325 mg by mouth daily with breakfast.    fish oil-omega-3 fatty acids 300-1,000 mg capsule Take 2 g by mouth every evening.     fluticasone (FLONASE) 50 mcg/actuation nasal spray 1 spray by Each Nare route once daily.    gabapentin (NEURONTIN) 100 MG capsule Take 1 capsule (100 mg total) by mouth every evening. Take 200 mg qhs X 1 week then 100 mg qhs    guanfacine (TENEX) 1 MG Tab Take 1 tablet (1 mg total) by mouth every evening.    lancets (ULTRA THIN LANCETS) 28 gauge [facility] [place] [facility] 1 lancet by Subdermal route 2 (two) times daily.    levothyroxine (SYNTHROID) 75 MCG tablet Take 1 tablet (75 mcg total) by mouth once daily.    magnesium oxide (MAG-OX) 400 mg tablet take by mouth once daily    nitroGLYCERIN (NITROSTAT) 0.4 MG SL tablet Place 1 tablet (0.4 mg total) under the tongue every 5 (five) minutes as needed for  "Chest pain. As needed    ondansetron (ZOFRAN-ODT) 4 MG TbDL Take 1 tablet (4 mg total) by mouth every 8 (eight) hours as needed. Every 8 hours as needed for nausea/vomiting    simvastatin (ZOCOR) 40 MG tablet Take 1 tablet (40 mg total) by mouth every evening.    vitamin D 1000 units Tab Take 1 tablet (1,000 Units total) by mouth once daily.           Clinical Reference Information           Your Vitals Were     Height Weight Last Period BMI       5' 3" (1.6 m) 61.5 kg (135 lb 9.3 oz) (LMP Unknown) 24.02 kg/m2       Allergies as of 4/11/2017     Carvedilol    Boniva [Ibandronate]    Codeine    Hydralazine Analogues    Iodinated Contrast Media - Iv Dye    Lisinopril    Morphine      Immunizations Administered on Date of Encounter - 4/11/2017     None      [facility] Sign-Up     Activating your [facility] account is as easy as 1-2-3!     1) Visit [email or web address], select Sign Up Now, enter this activation code and your date of birth, then select Next.  Activation code not generated  Current Patient Portal Status: Account disabled      2) Create a username and password to use when you visit [facility] in the future and select a security question in case you lose your password and select Next.    3) Enter your e-mail address and click Sign Up!    Additional Information  If you have questions, please e-mail [email or web address] or call [phone] to talk to our [facility] staff. Remember, [facility] is NOT to be used for urgent needs. For medical emergencies, dial 911.         Language Assistance Services     ATTENTION: Language assistance services are available, free of charge. Please call [phone].      ATENCIÓN: Si habla español, tiene a mir disposición servicios gratuitos de asistencia lingüística. Llame al [phone].     CHÚ Ý: N?u b?n nói Ti?ng Vi?t, có các d?ch v? h? tr? ngôn ng? mi?n phí dành cho b?n. G?i s? [phone].         [facility] complies with applicable Federal civil rights laws and " does not discriminate on the basis of race, color, national origin, age, disability, or sex.

## 2017-04-11 NOTE — PROGRESS NOTES
Subjective:      Patient ID: Blaire Cage is a 86 y.o. female.    Chief Complaint: Diabetic Foot Exam (A1C   6.4 9/20/16     PCP- Eli Edmonds appt. 5/22/17); Nail Care; and Callouses    Blaire is a 86 y.o. female who presents to the clinic for routine evaluation and treatment of diabetic feet. Blaire has a past medical history of Anticoagulant long-term use; COPD (chronic obstructive pulmonary disease); COPD with acute exacerbation (1/9/2015); Diabetes mellitus type II; Encounter for blood transfusion; Hip arthritis (3/1/2016); Hyperlipidemia; Hypertension; and Thyroid disease. Patient relates no major problem with feet. Only complaints today consist of toenails in need of trimming.  Relates having occasional discomfort from toenails with wearing closed toe shoes.  Has not attempted to self treat.  Denies any additional pedal complaints.     PCP: Dr. Edmonds   Date Last Seen by PCP: 5/22/17    Current shoe gear: Rx diabetic shoes with heat molded insoles.     Hemoglobin A1C   Date Value Ref Range Status   09/20/2016 6.4 (H) 4.5 - 6.2 % Final     Comment:     According to ADA guidelines, hemoglobin A1C <7.0% represents  optimal control in non-pregnant diabetic patients.  Different  metrics may apply to specific populations.   Standards of Medical Care in Diabetes - 2016.  For the purpose of screening for the presence of diabetes:  <5.7%     Consistent with the absence of diabetes  5.7-6.4%  Consistent with increasing risk for diabetes   (prediabetes)  >or=6.5%  Consistent with diabetes  Currently no consensus exists for use of hemoglobin A1C  for diagnosis of diabetes for children.     06/20/2016 6.3 (H) 4.5 - 6.2 % Final   06/05/2016 6.3 (H) 4.5 - 6.2 % Final           Past Medical History:   Diagnosis Date    Anticoagulant long-term use     aspirin    COPD (chronic obstructive pulmonary disease)     COPD with acute exacerbation 1/9/2015    Diabetes mellitus type II     Encounter for blood transfusion      Hip arthritis 3/1/2016    Hyperlipidemia     Hypertension     Thyroid disease     hypothyroid       Past Surgical History:   Procedure Laterality Date    APPENDECTOMY      FRACTURE SURGERY      right hip     HERNIA REPAIR      groin    HYSTERECTOMY      VARICOSE VEIN SURGERY         Family History   Problem Relation Age of Onset    Hypertension Mother     Diabetes Sister     Diabetes Brother     Kidney disease Son        Social History     Social History    Marital status: Legally      Spouse name: N/A    Number of children: N/A    Years of education: N/A     Social History Main Topics    Smoking status: Former Smoker     Years: 44.00     Types: Cigarettes     Quit date: 4/30/2015    Smokeless tobacco: Never Used      Comment: 1/08/2015    Alcohol use No    Drug use: No    Sexual activity: No     Other Topics Concern    None     Social History Narrative       Current Outpatient Prescriptions   Medication Sig Dispense Refill    albuterol-ipratropium 2.5mg-0.5mg/3mL (DUO-NEB) 0.5 mg-3 mg(2.5 mg base)/3 mL nebulizer solution USE ONE AMPULE IN NEBULIZER EVERY 6 HOURS AS NEEDED FOR WHEEZING 180 mL 0    amlodipine (NORVASC) 10 MG tablet Take 1 tablet (10 mg total) by mouth once daily. 90 tablet 3    arformoterol (BROVANA) 15 mcg/2 mL Nebu Take 2 mLs (15 mcg total) by nebulization 2 (two) times daily. 60 vial 6    ascorbic acid (VITAMIN C) 500 MG tablet Take 500 mg by mouth once daily.        aspirin (ECOTRIN) 81 MG EC tablet Take 81 mg by mouth once daily.        blood sugar diagnostic Strp 1 strip by Misc.(Non-Drug; Combo Route) route 2 (two) times daily. 100 each 5    calcium carbonate (OS-TASIA) 500 mg calcium (1,250 mg) tablet Take 1 tablet by mouth 2 (two) times daily.        citalopram (CELEXA) 10 MG tablet Take 1 tablet (10 mg total) by mouth once daily. 30 tablet 4    CLEVER CHEK BLOOD GLUCOSE SYST kit       docusate sodium (COLACE) 100 MG capsule Take 1 capsule (100 mg  total) by mouth 2 (two) times daily. 60 capsule 11    ferrous sulfate 325 mg (65 mg iron) Tab tablet Take 325 mg by mouth daily with breakfast.      fish oil-omega-3 fatty acids 300-1,000 mg capsule Take 2 g by mouth every evening.       fluticasone (FLONASE) 50 mcg/actuation nasal spray 1 spray by Each Nare route once daily. (Patient taking differently: 1 spray by Each Nare route daily as needed. ) 16 g 0    gabapentin (NEURONTIN) 100 MG capsule Take 1 capsule (100 mg total) by mouth every evening. Take 200 mg qhs X 1 week then 100 mg qhs 45 capsule 5    guanfacine (TENEX) 1 MG Tab Take 1 tablet (1 mg total) by mouth every evening. 30 tablet 11    lancets (ULTRA THIN LANCETS) 28 gauge Misc 1 lancet by Subdermal route 2 (two) times daily. 180 each 3    levothyroxine (SYNTHROID) 75 MCG tablet Take 1 tablet (75 mcg total) by mouth once daily. 90 tablet 3    magnesium oxide (MAG-OX) 400 mg tablet take by mouth once daily 90 tablet 2    nitroGLYCERIN (NITROSTAT) 0.4 MG SL tablet Place 1 tablet (0.4 mg total) under the tongue every 5 (five) minutes as needed for Chest pain. As needed 30 tablet 3    ondansetron (ZOFRAN-ODT) 4 MG TbDL Take 1 tablet (4 mg total) by mouth every 8 (eight) hours as needed. Every 8 hours as needed for nausea/vomiting 20 tablet 0    simvastatin (ZOCOR) 40 MG tablet Take 1 tablet (40 mg total) by mouth every evening. 90 tablet 0    vitamin D 1000 units Tab Take 1 tablet (1,000 Units total) by mouth once daily. 90 tablet 3     No current facility-administered medications for this visit.        Review of patient's allergies indicates:   Allergen Reactions    Carvedilol Other (See Comments)     Bradycardia and syncope    Boniva [ibandronate]     Codeine     Hydralazine analogues     Iodinated contrast media - iv dye Hives    Lisinopril     Morphine          Review of Systems   Constitution: Negative for chills and fever.   Cardiovascular: Negative for claudication and leg  swelling.   Skin: Positive for color change and nail changes.   Musculoskeletal: Positive for arthritis and joint pain.   Neurological: Positive for paresthesias. Negative for numbness.   Psychiatric/Behavioral: Negative for altered mental status.           Objective:      Physical Exam   Constitutional: She is oriented to person, place, and time. She appears well-developed and well-nourished. No distress.   Cardiovascular:   Pulses:       Dorsalis pedis pulses are 2+ on the right side, and 2+ on the left side.        Posterior tibial pulses are 1+ on the right side, and 1+ on the left side.   CFT <3 seconds bilateral.  Pedal hair growth decreased bilateral.  Varicosities noted bilateral.  Mild nonpitting edema noted bilateral.  Toes are cool to touch bilateral.     Musculoskeletal: She exhibits edema. She exhibits no tenderness.   Muscle strength 5/5 in all muscle groups bilateral.  No tenderness nor crepitation with ROM of foot/ankle joints bilateral.  No tenderness with palpation of bilateral foot and ankle.  Bilateral pes planus.  Bilateral hallux abducto valgus.  Bilateral semi-rigid contracture of toes 2-5.     Neurological: She is alert and oriented to person, place, and time. She has normal strength. A sensory deficit is present.   Protective sensation per [place] monofilament intact bilateral.    Vibratory sensation decreased bilateral.    Light touch intact bilateral.   Skin: Skin is warm, dry and intact. Lesion noted. No abrasion, no bruising, no burn, no ecchymosis, no laceration, no petechiae and no rash noted. She is not diaphoretic. No cyanosis or erythema. No pallor. Nails show no clubbing.   Xerosis of pedal skin bilateral.  Pedal skin appears thin and atrophic bilateral. Toenails x 10 appear thickened by 3 mm's, elongated by 3 mm's, and discolored with subungual debris.  Hemosiderin staining noted to bilateral lower extremity.  Focal hyperkeratotic lesion noted to the plantar lateral  midfoot on the Lt.  Examination of the skin reveals no evidence of significant maceration, rashes, open lesions, suspicious appearing nevi or other concerning lesions.              Assessment:       Encounter Diagnoses   Name Primary?    Diabetic polyneuropathy associated with type 2 diabetes mellitus Yes    Onychomycosis due to dermatophyte     Hammer toes of both feet          Plan:       Blaire was seen today for diabetic foot exam, nail care and callouses.    Diagnoses and all orders for this visit:    Diabetic polyneuropathy associated with type 2 diabetes mellitus    Onychomycosis due to dermatophyte    Hammer toes of both feet      I counseled the patient on her conditions, their implications and medical management.    Advised to continue wearing diabetic shoes as they accommodate for digital deformities.      Shoe inspection. Diabetic Foot Education. Patient reminded of the importance of good nutrition and blood sugar control to help prevent podiatric complications of diabetes. Patient instructed on proper foot hygeine. We discussed wearing proper shoe gear, daily foot inspections, never walking without protective shoe gear, never putting sharp instruments to feet    With patient's permission, nails were aggressively reduced and debrided x 10 to their soft tissue attachment mechanically and with electric , removing all offending nail and debris.  Also, a sterile # 15 scalpel was used to trim lesion x 1 down to smooth appearing skin without incident.  Patient relates relief following the procedure. She will continue to monitor the areas daily, inspect her feet, wear protective shoe gear when ambulatory, moisturizer to maintain skin integrity and follow in this office in approximately 2-3 months, sooner p.r.n.    Return in about 3 months (around 7/11/2017).    Derrick Hendricks DPM

## 2017-05-14 ENCOUNTER — DOCUMENTATION ONLY (OUTPATIENT)
Dept: FAMILY MEDICINE | Facility: CLINIC | Age: 82
End: 2017-05-14

## 2017-05-14 NOTE — PATIENT INSTRUCTIONS

## 2017-05-14 NOTE — PROGRESS NOTES
Pre-Visit Chart Review  For Appointment Scheduled on 05/22/2017    Health Maintenance Due   Topic Date Due    TETANUS VACCINE  07/21/1948    Eye Exam  04/08/2015    Hemoglobin A1c  03/20/2017

## 2017-05-18 RX ORDER — GABAPENTIN 100 MG/1
100 CAPSULE ORAL NIGHTLY
Qty: 30 CAPSULE | Refills: 2 | Status: SHIPPED | OUTPATIENT
Start: 2017-05-18 | End: 2017-05-22 | Stop reason: SDUPTHER

## 2017-05-18 NOTE — TELEPHONE ENCOUNTER
----- Message from Elizabeth Gould sent at 5/18/2017  3:14 PM CDT -----  Returning your call.  Please call [phone]

## 2017-05-18 NOTE — TELEPHONE ENCOUNTER
i received a refill request on gabapentin   last rx was written to reduce the dose to 100 mg nightly so this is what i sent in   Please verify that this is what she is taking

## 2017-05-22 ENCOUNTER — OFFICE VISIT (OUTPATIENT)
Dept: FAMILY MEDICINE | Facility: CLINIC | Age: 82
End: 2017-05-22
Payer: MEDICARE

## 2017-05-22 VITALS
SYSTOLIC BLOOD PRESSURE: 132 MMHG | BODY MASS INDEX: 23.86 KG/M2 | DIASTOLIC BLOOD PRESSURE: 66 MMHG | HEART RATE: 55 BPM | TEMPERATURE: 98 F | HEIGHT: 63 IN | WEIGHT: 134.69 LBS

## 2017-05-22 DIAGNOSIS — E03.9 HYPOTHYROIDISM, UNSPECIFIED TYPE: ICD-10-CM

## 2017-05-22 DIAGNOSIS — E11.40 TYPE 2 DIABETES MELLITUS WITH DIABETIC NEUROPATHY, WITHOUT LONG-TERM CURRENT USE OF INSULIN: Primary | ICD-10-CM

## 2017-05-22 DIAGNOSIS — I15.2 HYPERTENSION ASSOCIATED WITH DIABETES: ICD-10-CM

## 2017-05-22 DIAGNOSIS — R09.89 RIGHT CAROTID BRUIT: ICD-10-CM

## 2017-05-22 DIAGNOSIS — E11.69 HYPERLIPIDEMIA ASSOCIATED WITH TYPE 2 DIABETES MELLITUS: ICD-10-CM

## 2017-05-22 DIAGNOSIS — D50.0 IRON DEFICIENCY ANEMIA DUE TO CHRONIC BLOOD LOSS: ICD-10-CM

## 2017-05-22 DIAGNOSIS — K59.03 DRUG-INDUCED CONSTIPATION: ICD-10-CM

## 2017-05-22 DIAGNOSIS — E78.5 HYPERLIPIDEMIA ASSOCIATED WITH TYPE 2 DIABETES MELLITUS: ICD-10-CM

## 2017-05-22 DIAGNOSIS — J43.8 OTHER EMPHYSEMA: ICD-10-CM

## 2017-05-22 DIAGNOSIS — E11.59 HYPERTENSION ASSOCIATED WITH DIABETES: ICD-10-CM

## 2017-05-22 DIAGNOSIS — N18.30 CKD (CHRONIC KIDNEY DISEASE), STAGE III: ICD-10-CM

## 2017-05-22 PROCEDURE — 1125F AMNT PAIN NOTED PAIN PRSNT: CPT | Mod: S$GLB,,, | Performed by: FAMILY MEDICINE

## 2017-05-22 PROCEDURE — 99499 UNLISTED E&M SERVICE: CPT | Mod: S$PBB,,, | Performed by: FAMILY MEDICINE

## 2017-05-22 PROCEDURE — 99214 OFFICE O/P EST MOD 30 MIN: CPT | Mod: S$GLB,,, | Performed by: FAMILY MEDICINE

## 2017-05-22 PROCEDURE — 99999 PR PBB SHADOW E&M-EST. PATIENT-LVL III: CPT | Mod: PBBFAC,,, | Performed by: FAMILY MEDICINE

## 2017-05-22 PROCEDURE — 1159F MED LIST DOCD IN RCRD: CPT | Mod: S$GLB,,, | Performed by: FAMILY MEDICINE

## 2017-05-22 RX ORDER — LANOLIN ALCOHOL/MO/W.PET/CERES
CREAM (GRAM) TOPICAL
Qty: 90 TABLET | Refills: 3 | Status: SHIPPED | OUTPATIENT
Start: 2017-05-22 | End: 2018-07-10 | Stop reason: SDUPTHER

## 2017-05-22 RX ORDER — GABAPENTIN 300 MG/1
300 CAPSULE ORAL NIGHTLY
Qty: 30 CAPSULE | Refills: 11 | Status: SHIPPED | OUTPATIENT
Start: 2017-05-22 | End: 2017-12-11 | Stop reason: SDUPTHER

## 2017-05-22 RX ORDER — FLUTICASONE FUROATE AND VILANTEROL 100; 25 UG/1; UG/1
1 POWDER RESPIRATORY (INHALATION) 2 TIMES DAILY
Qty: 1 EACH | Refills: 11 | Status: SHIPPED | OUTPATIENT
Start: 2017-05-22 | End: 2017-08-22

## 2017-05-22 RX ORDER — IPRATROPIUM BROMIDE AND ALBUTEROL SULFATE 2.5; .5 MG/3ML; MG/3ML
SOLUTION RESPIRATORY (INHALATION)
Qty: 180 ML | Refills: 0 | Status: SHIPPED | OUTPATIENT
Start: 2017-05-22 | End: 2017-07-14 | Stop reason: SDUPTHER

## 2017-05-23 ENCOUNTER — HOSPITAL ENCOUNTER (OUTPATIENT)
Dept: RADIOLOGY | Facility: CLINIC | Age: 82
Discharge: HOME | End: 2017-05-23
Attending: FAMILY MEDICINE
Payer: MEDICARE

## 2017-05-23 DIAGNOSIS — M81.0 AGE RELATED OSTEOPOROSIS: Primary | ICD-10-CM

## 2017-05-23 DIAGNOSIS — I10 ESSENTIAL HYPERTENSION, MALIGNANT: ICD-10-CM

## 2017-05-23 DIAGNOSIS — D64.9 ANEMIA, UNSPECIFIED: ICD-10-CM

## 2017-05-23 DIAGNOSIS — R09.89 RIGHT CAROTID BRUIT: ICD-10-CM

## 2017-05-23 DIAGNOSIS — E11.9 DIABETES MELLITUS WITHOUT COMPLICATION: ICD-10-CM

## 2017-05-23 DIAGNOSIS — N20.0 CALCULUS OF KIDNEY: ICD-10-CM

## 2017-05-23 DIAGNOSIS — N18.30 CHRONIC KIDNEY DISEASE, STAGE III (MODERATE): ICD-10-CM

## 2017-05-23 PROCEDURE — 93880 EXTRACRANIAL BILAT STUDY: CPT | Mod: TC,PO

## 2017-05-23 PROCEDURE — 93880 EXTRACRANIAL BILAT STUDY: CPT | Mod: 26,,, | Performed by: RADIOLOGY

## 2017-05-30 ENCOUNTER — TELEPHONE (OUTPATIENT)
Dept: FAMILY MEDICINE | Facility: CLINIC | Age: 82
End: 2017-05-30

## 2017-05-30 NOTE — TELEPHONE ENCOUNTER
Spoke with patient about her BS of 225. Patient is diet controlled diabetic. Her HgbA1c is 6.5- averaging 140. She admittedly ate some gumbo with rice yesterday. I did let her know that it was probably elevated due to the rice. She is a little stressed out due to a bill from [facility] for her prolia injection which she has been getting and never been charged like that. I advised she call [facility] and see what code they used to bill with and give it to them at [facility] infusion center for them to use. She felt better after we talked. I also, instructed her to monitor her BS and if consistantly in the 200's to call that she may need oral medication again but if it is only from time to time to monitor what she is eating or if she is sick or under a lot of stress or new medication as it would probably be related to that as long as its not happening regularly. She verbalized understanding.

## 2017-05-30 NOTE — TELEPHONE ENCOUNTER
----- Message from Domi Davenport sent at 5/30/2017 11:39 AM CDT -----  Contact: pt  Requesting to speak with nurse regarding high Blood Sugar Reading 225  i did reach pod, was advised will call pt right back  Call back on# [phone]  richard

## 2017-06-06 ENCOUNTER — DOCUMENTATION ONLY (OUTPATIENT)
Dept: FAMILY MEDICINE | Facility: CLINIC | Age: 82
End: 2017-06-06

## 2017-06-06 ENCOUNTER — OFFICE VISIT (OUTPATIENT)
Dept: FAMILY MEDICINE | Facility: CLINIC | Age: 82
End: 2017-06-06
Payer: MEDICARE

## 2017-06-06 VITALS
DIASTOLIC BLOOD PRESSURE: 70 MMHG | RESPIRATION RATE: 14 BRPM | TEMPERATURE: 98 F | HEIGHT: 63 IN | SYSTOLIC BLOOD PRESSURE: 118 MMHG | BODY MASS INDEX: 25.04 KG/M2 | OXYGEN SATURATION: 95 % | WEIGHT: 141.31 LBS | HEART RATE: 60 BPM

## 2017-06-06 DIAGNOSIS — E11.8 TYPE 2 DIABETES MELLITUS WITH COMPLICATION, WITHOUT LONG-TERM CURRENT USE OF INSULIN: ICD-10-CM

## 2017-06-06 DIAGNOSIS — E11.59 HYPERTENSION ASSOCIATED WITH DIABETES: Primary | ICD-10-CM

## 2017-06-06 DIAGNOSIS — I71.40 ABDOMINAL AORTIC ANEURYSM (AAA) WITHOUT RUPTURE: ICD-10-CM

## 2017-06-06 DIAGNOSIS — E03.9 HYPOTHYROIDISM, UNSPECIFIED TYPE: ICD-10-CM

## 2017-06-06 DIAGNOSIS — I15.2 HYPERTENSION ASSOCIATED WITH DIABETES: Primary | ICD-10-CM

## 2017-06-06 DIAGNOSIS — E11.69 HYPERLIPIDEMIA ASSOCIATED WITH TYPE 2 DIABETES MELLITUS: ICD-10-CM

## 2017-06-06 DIAGNOSIS — E78.5 HYPERLIPIDEMIA ASSOCIATED WITH TYPE 2 DIABETES MELLITUS: ICD-10-CM

## 2017-06-06 DIAGNOSIS — N18.30 CKD (CHRONIC KIDNEY DISEASE), STAGE III: ICD-10-CM

## 2017-06-06 DIAGNOSIS — K21.9 GASTROESOPHAGEAL REFLUX DISEASE, ESOPHAGITIS PRESENCE NOT SPECIFIED: ICD-10-CM

## 2017-06-06 PROCEDURE — 99214 OFFICE O/P EST MOD 30 MIN: CPT | Mod: S$GLB,,, | Performed by: PHYSICIAN ASSISTANT

## 2017-06-06 PROCEDURE — 1126F AMNT PAIN NOTED NONE PRSNT: CPT | Mod: S$GLB,,, | Performed by: PHYSICIAN ASSISTANT

## 2017-06-06 PROCEDURE — 99499 UNLISTED E&M SERVICE: CPT | Mod: S$PBB,,, | Performed by: PHYSICIAN ASSISTANT

## 2017-06-06 PROCEDURE — 1159F MED LIST DOCD IN RCRD: CPT | Mod: S$GLB,,, | Performed by: PHYSICIAN ASSISTANT

## 2017-06-06 PROCEDURE — 99999 PR PBB SHADOW E&M-EST. PATIENT-LVL V: CPT | Mod: PBBFAC,,, | Performed by: PHYSICIAN ASSISTANT

## 2017-06-06 RX ORDER — PANTOPRAZOLE SODIUM 20 MG/1
20 TABLET, DELAYED RELEASE ORAL DAILY
Qty: 30 TABLET | Refills: 2 | Status: SHIPPED | OUTPATIENT
Start: 2017-06-06 | End: 2017-09-11

## 2017-06-06 NOTE — PROGRESS NOTES
Pre-Visit Chart Review  For Appointment Scheduled on 6/6/17    Health Maintenance Due   Topic Date Due    TETANUS VACCINE  07/21/1948    Eye Exam  04/08/2015

## 2017-06-06 NOTE — PROGRESS NOTES
"Subjective:       Patient ID: Blaire Cage is a 86 y.o. female.    Chief Complaint: Other (Patient states that it feel like something's in her throat)    Ms. Cage comes to clinic today for follow up. She was recently seen for her [place] physical. They scheduled this appointment for her because she is complaining of "fullness" in her throat and the feeling that she has not swallowed her food. The patient also admits to increased belching. The patient admits that her nephrologist, Dr. Jo, recently stopped one of her blood pressure medications because her blood pressure was running too low. The patient recently had blood work that returned stable overall. She has CKD, followed by Dr. Jo, She does have an iron deficiency anemia for which she takes iron twice daily. The patient does have a AAA that is stable. She had an ultrasound in February. She will not be due to repeat this screening until February 2018.       Review of Systems   Constitutional: Negative for activity change, appetite change and fever.   HENT: Negative for postnasal drip, rhinorrhea and sinus pressure.    Eyes: Negative for visual disturbance.   Respiratory: Negative for cough and shortness of breath.    Cardiovascular: Negative for chest pain.   Gastrointestinal: Negative for abdominal distention and abdominal pain.        Belching, dysphagia   Genitourinary: Negative for difficulty urinating and dysuria.   Musculoskeletal: Negative for arthralgias and myalgias.   Neurological: Negative for headaches.   Hematological: Negative for adenopathy.   Psychiatric/Behavioral: The patient is not nervous/anxious.        Objective:      Physical Exam   Constitutional: She is oriented to person, place, and time.   HENT:   Mouth/Throat: Oropharynx is clear and moist. No oropharyngeal exudate.   Eyes: Conjunctivae are normal. Pupils are equal, round, and reactive to light.   Cardiovascular: Normal rate and regular rhythm.  "   Pulmonary/Chest: Effort normal and breath sounds normal. She has no wheezes.   Abdominal: Soft. Bowel sounds are normal. She exhibits no distension. There is no tenderness.   Musculoskeletal: She exhibits no edema.   Lymphadenopathy:     She has no cervical adenopathy.   Neurological: She is alert and oriented to person, place, and time.   Skin: No erythema.   Psychiatric: Her behavior is normal.       Assessment:       1. Hypertension associated with diabetes    2. CKD (chronic kidney disease), stage III, eGFR 39, progressive 31    3. Hypothyroidism, unspecified type    4. Hyperlipidemia associated with type 2 diabetes mellitus    5. Type 2 diabetes mellitus with complication, without long-term current use of insulin    6. Abdominal aortic aneurysm (AAA) without rupture    7. Gastroesophageal reflux disease, esophagitis presence not specified        Plan:   Blaire was seen today for other.    Diagnoses and all orders for this visit:    Hypertension associated with diabetes  Controlled  Low salt diet  Continue to monitor since Dr. Jo stopped medication  CKD (chronic kidney disease), stage III, eGFR 39, progressive 31  Continue follow up with Dr. Jo  Hypothyroidism, unspecified type  Recent TSH normal  Continue current medication dose  Hyperlipidemia associated with type 2 diabetes mellitus  High fiber diet  Continue current medication  Type 2 diabetes mellitus with complication, without long-term current use of insulin  Well controlled   Abdominal aortic aneurysm (AAA) without rupture  Recent ultrasound shows stable AAA in Feb 2017  Repeat US in Feb 2018 per Dr. Tate. He has recommended annual screenings  Gastroesophageal reflux disease, esophagitis presence not specified  -     FL Esophagram Complete; Future  -     pantoprazole (PROTONIX) 20 MG tablet; Take 1 tablet (20 mg total) by mouth once daily.  Avoid trigger foods  Stop eating 2-3 hours before bed    Patient readiness: acceptance and  barriers:none    During the course of the visit the patient was educated and counseled about the following:     Diabetes:  Discussed general issues about diabetes pathophysiology and management.  Addressed ADA diet.  Suggested low cholesterol diet.  Encouraged aerobic exercise.  Discussed foot care.  Reminded to get yearly retinal exam.  Hypertension:   Dietary sodium restriction.  Regular aerobic exercise.    Goals: Diabetes: Maintain Hemoglobin A1C below 7 and Hypertension: Reduce Blood Pressure    Did patient meet goals/outcomes: Yes    The following self management tools provided: declined    Patient Instructions (the written plan) was given to the patient/family.     Time spent with patient: 30 minutes

## 2017-06-08 ENCOUNTER — HOSPITAL ENCOUNTER (OUTPATIENT)
Dept: RADIOLOGY | Facility: HOSPITAL | Age: 82
Discharge: HOME | End: 2017-06-08
Attending: FAMILY MEDICINE
Payer: MEDICARE

## 2017-06-08 DIAGNOSIS — K21.9 GASTROESOPHAGEAL REFLUX DISEASE, ESOPHAGITIS PRESENCE NOT SPECIFIED: ICD-10-CM

## 2017-06-08 PROCEDURE — 74220 X-RAY XM ESOPHAGUS 1CNTRST: CPT | Mod: 26,,, | Performed by: RADIOLOGY

## 2017-06-08 PROCEDURE — 74220 X-RAY XM ESOPHAGUS 1CNTRST: CPT | Mod: TC

## 2017-06-08 NOTE — PROGRESS NOTES
Subjective:       Patient ID: Blaire Cage is a 86 y.o. female.    Chief Complaint: Establish Care; Constipation; and Abdominal Pain    Patient Active Problem List   Diagnosis    Diabetic neuropathy, type II diabetes mellitus    CKD (chronic kidney disease), stage III, eGFR 39, progressive 31    Hypothyroid    Hypertension associated with diabetes    Hyperlipidemia associated with type 2 diabetes mellitus    Type 2 diabetes mellitus with complication    Groin pain    Right carotid bruit    COPD (chronic obstructive pulmonary disease)    Anxiety    Tobacco dependence in remission    Abdominal aortic aneurysm without rupture    Hip arthritis    Degenerative spinal arthritis    Osteoporosis    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 10/2016, 11/2016    Body mass index (BMI) 23.0-23.9, adult, today 24.1    Iron deficiency anemia due to chronic blood loss    Proteinuria due to type 2 diabetes mellitus    History of syncope in childhood    Prerenal azotemia    Drug-induced constipation   Patient here to establish care    C/o constipation and gassiness    Type 2 DM- -190. No lows.  On asa and statin.  Allergy to ace I    HPI  Review of Systems   Constitutional: Negative for unexpected weight change.   Gastrointestinal: Positive for constipation. Negative for anal bleeding, blood in stool, diarrhea and nausea.   Neurological: Positive for numbness (tingling and pain in feet at night).       Objective:      Physical Exam   Constitutional: She is oriented to person, place, and time. She appears well-developed and well-nourished.   Cardiovascular: Normal rate, regular rhythm and normal heart sounds.    Pulmonary/Chest: Effort normal and breath sounds normal.   Musculoskeletal:  She exhibits no edema.   Neurological: She is alert and oriented to person, place, and time.   Skin: Skin is warm and dry.   Psychiatric: She has a normal mood and affect.   Nursing note and vitals reviewed.      Assessment:       1. Type 2 diabetes mellitus with diabetic neuropathy, without long-term current use of insulin    2. Other emphysema    3. Hypertension associated with diabetes    4. CKD (chronic kidney disease), stage III, eGFR 39, progressive 31    5. Hypothyroidism, unspecified type    6. Hyperlipidemia associated with type 2 diabetes mellitus    7. Drug-induced constipation    8. Iron deficiency anemia due to chronic blood loss    9. Right carotid bruit        Plan:       1. Type 2 diabetes mellitus with diabetic neuropathy, without long-term current use of insulin  Stable condition.  Continue current medications.  Will adjust based on lab findings or if condition changes.    - Comprehensive metabolic panel; Future  - Hemoglobin A1c; Future  - Microalbumin/creatinine urine ratio; Future  Add  - gabapentin (NEURONTIN) 300 MG capsule; Take 1 capsule (300 mg total) by mouth every evening.  Dispense: 30 capsule; Refill: 11    2. Other emphysema  Controlled on current medications.  Continue current medications.  D/c brovana and add breo  - albuterol-ipratropium 2.5mg-0.5mg/3mL (DUO-NEB) 0.5 mg-3 mg(2.5 mg base)/3 mL nebulizer solution; USE ONE AMPULE IN NEBULIZER EVERY 6 HOURS AS NEEDED FOR WHEEZING  Dispense: 180 mL; Refill: 0  - fluticasone-vilanterol (BREO) 100-25 mcg/dose diskus inhaler; Inhale 1 puff into the lungs 2 (two) times daily. Controller  Dispense: 1 each; Refill: 11    3. Hypertension associated with diabetes  Controlled on current medications.  Continue current medications.      4. CKD (chronic kidney disease), stage III, eGFR 39, progressive 31  Stable and chronic.  Will continue to monitor q3-6 months and control chronic conditions as optimally as possible to preserve function.    - CBC  auto differential; Future  - Vitamin D; Future    5. Hypothyroidism, unspecified type  Stable condition.  Continue current medications.  Will adjust based on lab findings or if condition changes.    - TSH; Future  - T4, free; Future    6. Hyperlipidemia associated with type 2 diabetes mellitus  Stable condition.  Continue current medications.  Will adjust based on lab findings or if condition changes.    - Lipid panel; Future    7. Drug-induced constipation  Add:  - linaclotide (LINZESS) 145 mcg Cap capsule; Take 1 capsule (145 mcg total) by mouth once daily.  Dispense: 30 capsule; Refill: 11  Patient was counseled that these lifestyle changes can help prevent constipation:  · Diet. Eat a high-fiber diet, with fresh fruit and vegetables, and reduce dairy intake, meats, and processed foods.  An over the counter fiber supplement is recommended such as Fiberchoice chewables or Metamucil.  · Fluids. It's important to get enough fluids each day. Drink plenty of water when you eat more fiber. If you are on diet that limits the amount of fluid you can have, talk about this with your health care provider.  · Regular exercise. Check with your health care provider first.  I also advised use of over the counter stool softeners like docusate as needed for persistent constipation.  OTC probiotics may also be of benefit like Align to help regulation.  If acutely constipated the patient was advised to use otc fleets enema(s) and/or magnesium citrate to relieve symptoms.      8. Iron deficiency anemia due to chronic blood loss  Stable condition.  Continue current medications.  Will adjust based on lab findings or if condition changes.    - Ferritin; Future  - Iron and TIBC; Future    9. Right carotid bruit  Screen and treat as indicated:    - Radiology US Carotid Bilateral; Future      [facility] goal documentation:  Patient readiness: acceptance and barriers:readiness  During the course of the visit the patient was educated and  counseled about the following: Diabetes:  Discussed general issues about diabetes pathophysiology and management.  Hypertension:   Dietary sodium restriction.  Regular aerobic exercise.  Goals: Diabetes: Maintain Hemoglobin A1C below 7 and Hypertension: Reduce Blood Pressure  Goal/Outcomes met:Hypertension and type 2 DM  The following self management tools provided:none  Patient Instructions (the written plan) was given to the patient/family: Yes  Time spent with patient: 40 minutes    Patient with be reevaluated in 3 months or sooner prn    Greater than 50% of this visit was spent counseling as described in above documentation:Yes

## 2017-06-09 ENCOUNTER — TELEPHONE (OUTPATIENT)
Dept: FAMILY MEDICINE | Facility: CLINIC | Age: 82
End: 2017-06-09

## 2017-06-09 NOTE — TELEPHONE ENCOUNTER
----- Message from Winter Escalante sent at 6/9/2017 10:04 AM CDT -----  Contact: pt   Missed call   Call back

## 2017-06-14 ENCOUNTER — DOCUMENTATION ONLY (OUTPATIENT)
Dept: FAMILY MEDICINE | Facility: CLINIC | Age: 82
End: 2017-06-14

## 2017-06-14 NOTE — PROGRESS NOTES
Pre-Visit Chart Review  For Appointment Scheduled on 6/19/17    Health Maintenance Due   Topic Date Due    TETANUS VACCINE  07/21/1948    Eye Exam  07/15/2016

## 2017-06-19 RX ORDER — SIMVASTATIN 40 MG/1
TABLET, FILM COATED ORAL
Qty: 90 TABLET | Refills: 0 | Status: SHIPPED | OUTPATIENT
Start: 2017-06-19 | End: 2017-10-16 | Stop reason: SDUPTHER

## 2017-07-14 DIAGNOSIS — D50.0 IRON DEFICIENCY ANEMIA DUE TO CHRONIC BLOOD LOSS: Primary | ICD-10-CM

## 2017-07-14 DIAGNOSIS — J43.8 OTHER EMPHYSEMA: ICD-10-CM

## 2017-07-14 RX ORDER — FERROUS SULFATE 325(65) MG
325 TABLET ORAL
COMMUNITY
Start: 2017-07-14 | End: 2017-12-11 | Stop reason: SDUPTHER

## 2017-07-14 RX ORDER — IPRATROPIUM BROMIDE AND ALBUTEROL SULFATE 2.5; .5 MG/3ML; MG/3ML
SOLUTION RESPIRATORY (INHALATION)
Qty: 180 ML | Refills: 0 | Status: SHIPPED | OUTPATIENT
Start: 2017-07-14 | End: 2017-08-17 | Stop reason: SDUPTHER

## 2017-07-14 NOTE — TELEPHONE ENCOUNTER
Patient is requesting to be changed back to albuterol  Duo-neb.. Stated the Brovana is drying out her mouth and making her lips cracked.

## 2017-07-20 ENCOUNTER — TELEPHONE (OUTPATIENT)
Dept: CARDIOLOGY | Facility: CLINIC | Age: 82
End: 2017-07-20

## 2017-07-20 RX ORDER — FERROUS SULFATE 325(65) MG
TABLET ORAL
Qty: 60 TABLET | Refills: 5 | Status: SHIPPED | OUTPATIENT
Start: 2017-07-20 | End: 2017-07-26 | Stop reason: SDUPTHER

## 2017-07-20 NOTE — TELEPHONE ENCOUNTER
Patients daughter called to inform us that Ms. Rudd's bp was 203/70 and she had already taken her bp. Advise daughter she needed to go to the Er. Daughter voiced understanding. No further issues noted.

## 2017-07-26 ENCOUNTER — OFFICE VISIT (OUTPATIENT)
Dept: CARDIOLOGY | Facility: CLINIC | Age: 82
End: 2017-07-26
Payer: MEDICARE

## 2017-07-26 VITALS
WEIGHT: 138.88 LBS | SYSTOLIC BLOOD PRESSURE: 196 MMHG | DIASTOLIC BLOOD PRESSURE: 85 MMHG | BODY MASS INDEX: 24.61 KG/M2 | OXYGEN SATURATION: 95 % | HEART RATE: 65 BPM | HEIGHT: 63 IN

## 2017-07-26 DIAGNOSIS — N18.30 STAGE 3 CHRONIC KIDNEY DISEASE: Primary | ICD-10-CM

## 2017-07-26 DIAGNOSIS — I15.9 SECONDARY HYPERTENSION: Primary | ICD-10-CM

## 2017-07-26 DIAGNOSIS — N18.30 CKD (CHRONIC KIDNEY DISEASE), STAGE III: ICD-10-CM

## 2017-07-26 DIAGNOSIS — I15.9 SECONDARY HYPERTENSION: ICD-10-CM

## 2017-07-26 PROCEDURE — 1126F AMNT PAIN NOTED NONE PRSNT: CPT | Mod: S$GLB,,, | Performed by: INTERNAL MEDICINE

## 2017-07-26 PROCEDURE — 93000 ELECTROCARDIOGRAM COMPLETE: CPT | Mod: S$GLB,,, | Performed by: INTERNAL MEDICINE

## 2017-07-26 PROCEDURE — 99999 PR PBB SHADOW E&M-EST. PATIENT-LVL III: CPT | Mod: PBBFAC,,, | Performed by: INTERNAL MEDICINE

## 2017-07-26 PROCEDURE — 99499 UNLISTED E&M SERVICE: CPT | Mod: S$PBB,,, | Performed by: INTERNAL MEDICINE

## 2017-07-26 PROCEDURE — 99213 OFFICE O/P EST LOW 20 MIN: CPT | Mod: S$GLB,,, | Performed by: INTERNAL MEDICINE

## 2017-07-26 PROCEDURE — 1159F MED LIST DOCD IN RCRD: CPT | Mod: S$GLB,,, | Performed by: INTERNAL MEDICINE

## 2017-07-26 RX ORDER — CLONIDINE HYDROCHLORIDE 0.1 MG/1
TABLET ORAL
COMMUNITY
Start: 2017-07-23 | End: 2017-08-09 | Stop reason: SDUPTHER

## 2017-07-26 RX ORDER — GUANFACINE 1 MG/1
1 TABLET ORAL NIGHTLY
COMMUNITY
End: 2017-12-20 | Stop reason: SDUPTHER

## 2017-07-26 RX ORDER — LOSARTAN POTASSIUM AND HYDROCHLOROTHIAZIDE 25; 100 MG/1; MG/1
1 TABLET ORAL DAILY
Qty: 90 TABLET | Refills: 3 | Status: SHIPPED | OUTPATIENT
Start: 2017-07-26 | End: 2017-08-09 | Stop reason: ALTCHOICE

## 2017-07-26 NOTE — PROGRESS NOTES
[facility]    CC: Hypertension  Chief Complaint   Patient presents with    [facility]     Self Referral    Follow-up     Hospital Follow up - HTN       Patient ID: Blaire Cage is a 87 y.o. female with a past medical history of hypertension   HPI   She Walked into the office with a blood pressure of 200.  She had well-controlled blood pressure up until recently.  She went to [facility] with hypertensive urgency.  She was started on clonidine.  He has been taken 0.1 mg on a when necessary basis.  Blood blood pressure at home and majority of them have been in the 170s systolic.  Denies any chest pain, shortness of breath, orthopnea, PND, syncope or presyncope/    Past Medical History:   Diagnosis Date    Anticoagulant long-term use     aspirin    COPD (chronic obstructive pulmonary disease)     COPD with acute exacerbation 1/9/2015    Diabetes mellitus type II     Encounter for blood transfusion     Hip arthritis 3/1/2016    Hyperlipidemia     Hypertension     Thyroid disease     hypothyroid     Past Surgical History:   Procedure Laterality Date    APPENDECTOMY      FRACTURE SURGERY      right hip     HERNIA REPAIR      groin    HYSTERECTOMY      VARICOSE VEIN SURGERY       Social History     Social History    Marital status: Legally      Spouse name: N/A    Number of children: N/A    Years of education: N/A     Occupational History    Not on file.     Social History Main Topics    Smoking status: Former Smoker     Years: 44.00     Types: Cigarettes     Quit date: 4/30/2015    Smokeless tobacco: Never Used      Comment: 1/08/2015    Alcohol use No    Drug use: No    Sexual activity: No     Other Topics Concern    Not on file     Social History Narrative    No narrative on file     Family History   Problem Relation Age of Onset    Hypertension Mother     Diabetes Sister     Diabetes Brother     Kidney disease Son        Review of patient's allergies  indicates:   Allergen Reactions    Carvedilol Other (See Comments)     Bradycardia and syncope    Boniva [ibandronate]     Codeine     Hydralazine analogues     Iodinated contrast- oral and iv dye Hives    Lisinopril     Morphine        Medication List with Changes/Refills   New Medications    LOSARTAN-HYDROCHLOROTHIAZIDE 100-25 MG (HYZAAR) 100-25 MG PER TABLET    Take 1 tablet by mouth once daily.   Current Medications    ALBUTEROL-IPRATROPIUM 2.5MG-0.5MG/3ML (DUO-NEB) 0.5 MG-3 MG(2.5 MG BASE)/3 ML NEBULIZER SOLUTION    USE ONE AMPULE IN NEBULIZER EVERY 6 HOURS AS NEEDED FOR WHEEZING    ASCORBIC ACID (VITAMIN C) 500 MG TABLET    Take 500 mg by mouth once daily.      ASPIRIN (ECOTRIN) 81 MG EC TABLET    Take 81 mg by mouth once daily.      BLOOD SUGAR DIAGNOSTIC STRP    1 strip by Misc.(Non-Drug; Combo Route) route 2 (two) times daily.    CALCIUM CARBONATE (OS-TASIA) 500 MG CALCIUM (1,250 MG) TABLET    Take 1 tablet by mouth 2 (two) times daily.      CITALOPRAM (CELEXA) 10 MG TABLET    Take 1 tablet (10 mg total) by mouth once daily.    CLEVER CHEK BLOOD GLUCOSE SYST KIT        CLONIDINE (CATAPRES) 0.1 MG TABLET    Take one tablet by mouth every hour as needed for systolic blood pressure greater than 180.  Do not take more than 3 tablets in 24 hours.    DOCUSATE SODIUM (COLACE) 100 MG CAPSULE    Take 1 capsule (100 mg total) by mouth 2 (two) times daily.    FERROUS SULFATE 325 MG (65 MG IRON) TAB TABLET    Take 1 tablet (325 mg total) by mouth daily with breakfast.    FISH OIL-OMEGA-3 FATTY ACIDS 300-1,000 MG CAPSULE    Take 2 g by mouth every evening.     FLUTICASONE (FLONASE) 50 MCG/ACTUATION NASAL SPRAY    1 spray by Each Nare route once daily.    FLUTICASONE-VILANTEROL (BREO) 100-25 MCG/DOSE DISKUS INHALER    Inhale 1 puff into the lungs 2 (two) times daily. Controller    GABAPENTIN (NEURONTIN) 300 MG CAPSULE    Take 1 capsule (300 mg total) by mouth every evening.    GUANFACINE (TENEX) 1 MG TAB    Take 1  "mg by mouth every evening. Take 1 tablet by mouth in the evening    LANCETS (ULTRA THIN LANCETS) 28 GAUGE MISC    1 lancet by Subdermal route 2 (two) times daily.    LEVOTHYROXINE (SYNTHROID) 75 MCG TABLET    Take 1 tablet (75 mcg total) by mouth once daily.    LINACLOTIDE (LINZESS) 145 MCG CAP CAPSULE    Take 1 capsule (145 mcg total) by mouth once daily.    MAGNESIUM OXIDE (MAG-OX) 400 MG TABLET    take by mouth once daily    NITROGLYCERIN (NITROSTAT) 0.4 MG SL TABLET    Place 1 tablet (0.4 mg total) under the tongue every 5 (five) minutes as needed for Chest pain. As needed    PANTOPRAZOLE (PROTONIX) 20 MG TABLET    Take 1 tablet (20 mg total) by mouth once daily.    SIMVASTATIN (ZOCOR) 40 MG TABLET    TAKE ONE TABLET BY MOUTH ONCE DAILY IN THE EVENING    VITAMIN D 1000 UNITS TAB    Take 1 tablet (1,000 Units total) by mouth once daily.   Discontinued Medications    FERROUS SULFATE 325 MG (65 MG IRON) TAB TABLET    TAKE ONE TABLET BY MOUTH TWICE DAILY       Review of Systems  Constitution: Denies chills, fever, and sweats.  HENT: Denies headaches or blurry vision.  Cardiovascular: Denies chest pain or irregular heart beat.  Respiratory: Denies cough or shortness of breath.  Gastrointestinal: Denies abdominal pain, nausea, or vomiting.  Musculoskeletal: Denies muscle cramps.  Neurological: Denies dizziness or focal weakness.  Psychiatric/Behavioral: Normal mental status.  Hematologic/Lymphatic: Denies bleeding problem or easy bruising/bleeding.  Skin: Denies rash or suspicious lesions    Physical Examination  BP (!) 196/85 (BP Location: Right arm, Patient Position: Sitting)   Pulse 65   Ht 5' 3" (1.6 m)   Wt 63 kg (138 lb 14.2 oz)   LMP  (LMP Unknown)   SpO2 95%   BMI 24.60 kg/m²     Constitutional: No acute distress, conversant  HEENT: Sclera anicteric, Pupils equal, round and reactive to light, extraocular motions intact, Oropharynx clear  Neck: No JVD, no carotid bruits  Cardiovascular: regular rate and " rhythm, no murmur, rubs or gallops, normal S1/S2  Pulmonary: Clear to auscultation bilaterally  Abdominal: Abdomen soft, nontender, nondistended, positive bowel sounds  Extremities: No lower extremity edema,   Pulses:  Carotid pulses are 2+ on the right side, and 2+ on the left side.  Radial pulses are 2+ on the right side, and 2+ on the left side.   Femoral pulses are 2+ on the right side, and 2+ on the left side.  .    Skin: No ecchymosis, erythema, or ulcers  Psych: Alert and oriented x 3, appropriate affect  Neuro: CNII-XII intact, no focal deficits    Labs:  Most Recent Data  CBC:   Lab Results   Component Value Date    WBC 7.57 05/23/2017    WBC 7.24 05/23/2017    HGB 10.8 (L) 05/23/2017    HGB 10.9 (L) 05/23/2017    HCT 33.0 (L) 05/23/2017    HCT 32.9 (L) 05/23/2017     05/23/2017     05/23/2017    MCV 97 05/23/2017    MCV 97 05/23/2017    RDW 12.9 05/23/2017    RDW 13.0 05/23/2017     BMP:   Lab Results   Component Value Date     05/23/2017     05/23/2017    K 4.8 05/23/2017    K 4.6 05/23/2017     05/23/2017     05/23/2017    CO2 27 05/23/2017    CO2 26 05/23/2017    BUN 40 (H) 05/23/2017    BUN 41 (H) 05/23/2017    CREATININE 1.6 (H) 05/23/2017    CREATININE 1.6 (H) 05/23/2017     (H) 05/23/2017     (H) 05/23/2017    CALCIUM 9.1 05/23/2017    CALCIUM 9.0 05/23/2017    MG 2.2 02/24/2017    PHOS 3.7 05/23/2017    PHOS 3.7 05/23/2017     LFTS;   Lab Results   Component Value Date    PROT 6.2 05/23/2017    ALBUMIN 3.0 (L) 05/23/2017    ALBUMIN 3.0 (L) 05/23/2017    BILITOT 0.4 05/23/2017    AST 17 05/23/2017    ALKPHOS 56 05/23/2017    ALT 12 05/23/2017     COAGS:   Lab Results   Component Value Date    INR 0.9 08/24/2016     FLP:   Lab Results   Component Value Date    CHOL 149 05/23/2017    HDL 52 05/23/2017    LDLCALC 78.2 05/23/2017    TRIG 94 05/23/2017    CHOLHDL 34.9 05/23/2017     CARDIAC:   Lab Results   Component Value Date    TROPONINI <0.006  02/01/2017     (H) 09/20/2016       Imaging:    EKG:     Echo: 2016  No dobutamine induced wall motion abnormalities were identified.       CONCLUSIONS     1 - Concentric remodeling.     2 - Hyperdynamic left ventricular systolic function (EF 65-70%).     3 - Left ventricular diastolic dysfunction.     4 - Normal right ventricular systolic function .     5 - Pulmonary hypertension. The estimated PA systolic pressure is 47 mmHg.     6 - Small pericardial effusion.     7 - No siginificant change from Echo in 10/2015.     No evidence of stress induced myocardial ischemia.     Stress Testing:      I have personally reviewed these images and echo data    Assessment/Plan:  Blaire was seen today for establish care and follow-up.    Diagnoses and all orders for this visit:    Secondary hypertension  -     EKG 12-lead    Other orders  -     losartan-hydrochlorothiazide 100-25 mg (HYZAAR) 100-25 mg per tablet; Take 1 tablet by mouth once daily.     her blood pressure is remarkably elevated.  She has chronic kidney disease.  In view of this I have started her on losartan and hydrochlorothiazide.  We will check her creatinine in a week's time to make sure that it's not going in the wrong direction.  I will see her back in 2 weeks  She does have clonidine.  He had a reaction to lisinopril which was years back.  She does not remember it to be angioedema      Total duration of face to face visit time 30 minutes.  Total time spent counseling greater than fifty percent of total visit time.  Counseling included discussion regarding imaging findings, diagnosis, possibilities, treatment options, risks and benefits.  The patient had many questions regarding the options and long-term effect which were all answered to my best ability.      Alfredito Trevino MD,MRCP,RPVI,FACC,FSCAI.  Interventional Cardiology   Phone [phone]

## 2017-08-02 ENCOUNTER — LAB VISIT (OUTPATIENT)
Dept: LAB | Facility: HOSPITAL | Age: 82
End: 2017-08-02
Attending: INTERNAL MEDICINE
Payer: MEDICARE

## 2017-08-02 DIAGNOSIS — N18.30 STAGE 3 CHRONIC KIDNEY DISEASE: ICD-10-CM

## 2017-08-02 LAB
ANION GAP SERPL CALC-SCNC: 9 MMOL/L
BUN SERPL-MCNC: 39 MG/DL
CALCIUM SERPL-MCNC: 9.3 MG/DL
CHLORIDE SERPL-SCNC: 103 MMOL/L
CO2 SERPL-SCNC: 26 MMOL/L
CREAT SERPL-MCNC: 1.8 MG/DL
EST. GFR  (AFRICAN AMERICAN): 28.8 ML/MIN/1.73 M^2
EST. GFR  (NON AFRICAN AMERICAN): 24.9 ML/MIN/1.73 M^2
GLUCOSE SERPL-MCNC: 121 MG/DL
POTASSIUM SERPL-SCNC: 5.1 MMOL/L
SODIUM SERPL-SCNC: 138 MMOL/L

## 2017-08-02 PROCEDURE — 80048 BASIC METABOLIC PNL TOTAL CA: CPT

## 2017-08-02 PROCEDURE — 36415 COLL VENOUS BLD VENIPUNCTURE: CPT | Mod: PO

## 2017-08-07 ENCOUNTER — DOCUMENTATION ONLY (OUTPATIENT)
Dept: FAMILY MEDICINE | Facility: CLINIC | Age: 82
End: 2017-08-07

## 2017-08-07 NOTE — PROGRESS NOTES
Pre-Visit Chart Review  For Appointment Scheduled on 08/08/17    Health Maintenance Due   Topic Date Due    TETANUS VACCINE  07/21/1948    Eye Exam  10/30/2016    Influenza Vaccine  08/01/2017

## 2017-08-09 ENCOUNTER — OFFICE VISIT (OUTPATIENT)
Dept: CARDIOLOGY | Facility: CLINIC | Age: 82
End: 2017-08-09
Payer: MEDICARE

## 2017-08-09 VITALS
WEIGHT: 138 LBS | DIASTOLIC BLOOD PRESSURE: 70 MMHG | HEART RATE: 59 BPM | SYSTOLIC BLOOD PRESSURE: 159 MMHG | BODY MASS INDEX: 24.45 KG/M2 | OXYGEN SATURATION: 94 % | HEIGHT: 63 IN

## 2017-08-09 DIAGNOSIS — I15.2 HYPERTENSION ASSOCIATED WITH DIABETES: ICD-10-CM

## 2017-08-09 DIAGNOSIS — N18.30 CKD (CHRONIC KIDNEY DISEASE), STAGE III: Primary | ICD-10-CM

## 2017-08-09 DIAGNOSIS — E11.59 HYPERTENSION ASSOCIATED WITH DIABETES: ICD-10-CM

## 2017-08-09 PROCEDURE — 1159F MED LIST DOCD IN RCRD: CPT | Mod: S$GLB,,, | Performed by: INTERNAL MEDICINE

## 2017-08-09 PROCEDURE — 99499 UNLISTED E&M SERVICE: CPT | Mod: S$PBB,,, | Performed by: INTERNAL MEDICINE

## 2017-08-09 PROCEDURE — 1126F AMNT PAIN NOTED NONE PRSNT: CPT | Mod: S$GLB,,, | Performed by: INTERNAL MEDICINE

## 2017-08-09 PROCEDURE — 99999 PR PBB SHADOW E&M-EST. PATIENT-LVL V: CPT | Mod: PBBFAC,,, | Performed by: INTERNAL MEDICINE

## 2017-08-09 PROCEDURE — 99213 OFFICE O/P EST LOW 20 MIN: CPT | Mod: S$GLB,,, | Performed by: INTERNAL MEDICINE

## 2017-08-09 RX ORDER — AMLODIPINE BESYLATE 10 MG/1
10 TABLET ORAL DAILY
Qty: 90 TABLET | Refills: 3 | Status: ON HOLD | OUTPATIENT
Start: 2017-08-09 | End: 2018-09-15 | Stop reason: HOSPADM

## 2017-08-09 RX ORDER — CLONIDINE HYDROCHLORIDE 0.1 MG/1
0.1 TABLET ORAL 2 TIMES DAILY
Qty: 90 TABLET | Refills: 3 | Status: SHIPPED | OUTPATIENT
Start: 2017-08-09 | End: 2018-10-02 | Stop reason: CLARIF

## 2017-08-09 RX ORDER — LOSARTAN POTASSIUM 100 MG/1
100 TABLET ORAL DAILY
Qty: 90 TABLET | Refills: 3 | Status: SHIPPED | OUTPATIENT
Start: 2017-08-09 | End: 2018-08-21 | Stop reason: SDUPTHER

## 2017-08-09 NOTE — PROGRESS NOTES
[facility]    CC: Hypertension    Patient ID: Blaire Cage is a 87 y.o. female with a past medical history of hypertension    HPI  I made some changes to her medication last time.  I started her on losartan HCTZ combination.  Blood Pressure is better now however she does have spikes later on during the afternoon.    Her labs show her creatinine has gone up to 1.8    Past Medical History:   Diagnosis Date    Anticoagulant long-term use     aspirin    COPD (chronic obstructive pulmonary disease)     COPD with acute exacerbation 1/9/2015    Diabetes mellitus type II     Encounter for blood transfusion     Hip arthritis 3/1/2016    Hyperlipidemia     Hypertension     Thyroid disease     hypothyroid     Past Surgical History:   Procedure Laterality Date    APPENDECTOMY      FRACTURE SURGERY      right hip     HERNIA REPAIR      groin    HYSTERECTOMY      VARICOSE VEIN SURGERY       Social History     Social History    Marital status: Legally      Spouse name: N/A    Number of children: N/A    Years of education: N/A     Occupational History    Not on file.     Social History Main Topics    Smoking status: Former Smoker     Years: 44.00     Types: Cigarettes     Quit date: 4/30/2015    Smokeless tobacco: Never Used      Comment: 1/08/2015    Alcohol use No    Drug use: No    Sexual activity: No     Other Topics Concern    Not on file     Social History Narrative    No narrative on file     Family History   Problem Relation Age of Onset    Hypertension Mother     Diabetes Sister     Diabetes Brother     Kidney disease Son        Review of patient's allergies indicates:   Allergen Reactions    Carvedilol Other (See Comments)     Bradycardia and syncope    Boniva [ibandronate]     Codeine     Hydralazine analogues     Iodinated contrast- oral and iv dye Hives    Lisinopril     Morphine        Medication List with Changes/Refills   New Medications    AMLODIPINE  (NORVASC) 10 MG TABLET    Take 1 tablet (10 mg total) by mouth once daily.    LOSARTAN (COZAAR) 100 MG TABLET    Take 1 tablet (100 mg total) by mouth once daily.   Current Medications    ALBUTEROL-IPRATROPIUM 2.5MG-0.5MG/3ML (DUO-NEB) 0.5 MG-3 MG(2.5 MG BASE)/3 ML NEBULIZER SOLUTION    USE ONE AMPULE IN NEBULIZER EVERY 6 HOURS AS NEEDED FOR WHEEZING    ASCORBIC ACID (VITAMIN C) 500 MG TABLET    Take 500 mg by mouth once daily.      ASPIRIN (ECOTRIN) 81 MG EC TABLET    Take 81 mg by mouth once daily.      BLOOD SUGAR DIAGNOSTIC STRP    1 strip by Misc.(Non-Drug; Combo Route) route 2 (two) times daily.    CALCIUM CARBONATE (OS-TASIA) 500 MG CALCIUM (1,250 MG) TABLET    Take 1 tablet by mouth 2 (two) times daily.      CITALOPRAM (CELEXA) 10 MG TABLET    Take 1 tablet (10 mg total) by mouth once daily.    CLEVER CHEK BLOOD GLUCOSE SYST KIT        DOCUSATE SODIUM (COLACE) 100 MG CAPSULE    Take 1 capsule (100 mg total) by mouth 2 (two) times daily.    FERROUS SULFATE 325 MG (65 MG IRON) TAB TABLET    Take 1 tablet (325 mg total) by mouth daily with breakfast.    FISH OIL-OMEGA-3 FATTY ACIDS 300-1,000 MG CAPSULE    Take 2 g by mouth every evening.     FLUTICASONE (FLONASE) 50 MCG/ACTUATION NASAL SPRAY    1 spray by Each Nare route once daily.    FLUTICASONE-VILANTEROL (BREO) 100-25 MCG/DOSE DISKUS INHALER    Inhale 1 puff into the lungs 2 (two) times daily. Controller    GABAPENTIN (NEURONTIN) 300 MG CAPSULE    Take 1 capsule (300 mg total) by mouth every evening.    GUANFACINE (TENEX) 1 MG TAB    Take 1 mg by mouth every evening. Take 1 tablet by mouth in the evening    LANCETS (ULTRA THIN LANCETS) 28 GAUGE [facility]    1 lancet by Subdermal route 2 (two) times daily.    LEVOTHYROXINE (SYNTHROID) 75 MCG TABLET    Take 1 tablet (75 mcg total) by mouth once daily.    LINACLOTIDE (LINZESS) 145 MCG CAP CAPSULE    Take 1 capsule (145 mcg total) by mouth once daily.    MAGNESIUM OXIDE (MAG-OX) 400 MG TABLET    take by mouth once  "daily    NITROGLYCERIN (NITROSTAT) 0.4 MG SL TABLET    Place 1 tablet (0.4 mg total) under the tongue every 5 (five) minutes as needed for Chest pain. As needed    PANTOPRAZOLE (PROTONIX) 20 MG TABLET    Take 1 tablet (20 mg total) by mouth once daily.    SIMVASTATIN (ZOCOR) 40 MG TABLET    TAKE ONE TABLET BY MOUTH ONCE DAILY IN THE EVENING    VITAMIN D 1000 UNITS TAB    Take 1 tablet (1,000 Units total) by mouth once daily.   Changed and/or Refilled Medications    Modified Medication Previous Medication    CLONIDINE (CATAPRES) 0.1 MG TABLET cloNIDine (CATAPRES) 0.1 MG tablet       Take 1 tablet (0.1 mg total) by mouth 2 (two) times daily. Take one tablet by mouth every hour as needed for systolic blood pressure greater than 180.  Do not take more than 3 tablets in 24 hours.    Take one tablet by mouth every hour as needed for systolic blood pressure greater than 180.  Do not take more than 3 tablets in 24 hours.   Discontinued Medications    LOSARTAN-HYDROCHLOROTHIAZIDE 100-25 MG (HYZAAR) 100-25 MG PER TABLET    Take 1 tablet by mouth once daily.       Review of Systems  Constitution: Denies chills, fever, and sweats.  HENT: Denies headaches or blurry vision.  Cardiovascular: Denies chest pain or irregular heart beat.  Respiratory: Denies cough or shortness of breath.  Gastrointestinal: Denies abdominal pain, nausea, or vomiting.  Musculoskeletal: Denies muscle cramps.  Neurological: Denies dizziness or focal weakness.  Psychiatric/Behavioral: Normal mental status.  Hematologic/Lymphatic: Denies bleeding problem or easy bruising/bleeding.  Skin: Denies rash or suspicious lesions    Physical Examination  BP (!) 159/70 (BP Location: Right arm, Patient Position: Sitting)   Pulse (!) 59   Ht 5' 3" (1.6 m)   Wt 62.6 kg (138 lb 0.1 oz)   LMP  (LMP Unknown)   SpO2 (!) 94%   BMI 24.45 kg/m²     Constitutional: No acute distress, conversant  HEENT: Sclera anicteric, Pupils equal, round and reactive to light, " extraocular motions intact, Oropharynx clear  Neck: No JVD, no carotid bruits  Cardiovascular: regular rate and rhythm, no murmur, rubs or gallops, normal S1/S2  Pulmonary: Clear to auscultation bilaterally  Abdominal: Abdomen soft, nontender, nondistended, positive bowel sounds  Extremities: No lower extremity edema,   Pulses:  Carotid pulses are 2+ on the right side, and 2+ on the left side.  Radial pulses are 2+ on the right side, and 2+ on the left side.      Skin: No ecchymosis, erythema, or ulcers  Psych: Alert and oriented x 3, appropriate affect  Neuro: CNII-XII intact, no focal deficits    Labs:  Most Recent Data  CBC:   Lab Results   Component Value Date    WBC 7.57 05/23/2017    WBC 7.24 05/23/2017    HGB 10.8 (L) 05/23/2017    HGB 10.9 (L) 05/23/2017    HCT 33.0 (L) 05/23/2017    HCT 32.9 (L) 05/23/2017     05/23/2017     05/23/2017    MCV 97 05/23/2017    MCV 97 05/23/2017    RDW 12.9 05/23/2017    RDW 13.0 05/23/2017     BMP:   Lab Results   Component Value Date     08/02/2017    K 5.1 08/02/2017     08/02/2017    CO2 26 08/02/2017    BUN 39 (H) 08/02/2017    CREATININE 1.8 (H) 08/02/2017     (H) 08/02/2017    CALCIUM 9.3 08/02/2017    MG 2.2 02/24/2017    PHOS 3.7 05/23/2017    PHOS 3.7 05/23/2017     LFTS;   Lab Results   Component Value Date    PROT 6.2 05/23/2017    ALBUMIN 3.0 (L) 05/23/2017    ALBUMIN 3.0 (L) 05/23/2017    BILITOT 0.4 05/23/2017    AST 17 05/23/2017    ALKPHOS 56 05/23/2017    ALT 12 05/23/2017     COAGS:   Lab Results   Component Value Date    INR 0.9 08/24/2016     FLP:   Lab Results   Component Value Date    CHOL 149 05/23/2017    HDL 52 05/23/2017    LDLCALC 78.2 05/23/2017    TRIG 94 05/23/2017    CHOLHDL 34.9 05/23/2017     CARDIAC:   Lab Results   Component Value Date    TROPONINI <0.006 02/01/2017     (H) 09/20/2016       Imaging:    EKG:     Echo:    Stress Testing:      I have personally reviewed these images and echo  data    Assessment/Plan:  Diagnoses and all orders for this visit:    CKD (chronic kidney disease), stage III, eGFR 39, progressive 31    Hypertension associated with diabetes    Other orders  -     losartan (COZAAR) 100 MG tablet; Take 1 tablet (100 mg total) by mouth once daily.  -     amlodipine (NORVASC) 10 MG tablet; Take 1 tablet (10 mg total) by mouth once daily.  -     cloNIDine (CATAPRES) 0.1 MG tablet; Take 1 tablet (0.1 mg total) by mouth 2 (two) times daily. Take one tablet by mouth every hour as needed for systolic blood pressure greater than 180.  Do not take more than 3 tablets in 24 hours.         I stopped the hydrochlorothiazide and just resumed losartan 100 mg.  I have added Norvasc 10 mg.  She is on clonidine 0.1 mg when necessary if her systolic goes above 180.  She possibly would need the third agent which will decide on based on the effect of amlodipine.  If amlodipine is not working we will stop it and start nifedipine extended release and titrate up    Total duration of face to face visit time 30 minutes.  Total time spent counseling greater than fifty percent of total visit time.  Counseling included discussion regarding imaging findings, diagnosis, possibilities, treatment options, risks and benefits.  The patient had many questions regarding the options and long-term effect which were all answered to my best ability.      Alfredito Trevino MD,MRCP,RPVI,FACC,FSCAI.  Interventional Cardiology   Phone [phone]

## 2017-08-17 DIAGNOSIS — J43.8 OTHER EMPHYSEMA: ICD-10-CM

## 2017-08-17 RX ORDER — IPRATROPIUM BROMIDE AND ALBUTEROL SULFATE 2.5; .5 MG/3ML; MG/3ML
SOLUTION RESPIRATORY (INHALATION)
Qty: 180 ML | Refills: 0 | Status: SHIPPED | OUTPATIENT
Start: 2017-08-17 | End: 2017-08-22 | Stop reason: SDUPTHER

## 2017-08-21 ENCOUNTER — DOCUMENTATION ONLY (OUTPATIENT)
Dept: FAMILY MEDICINE | Facility: CLINIC | Age: 82
End: 2017-08-21

## 2017-08-21 NOTE — PROGRESS NOTES
Pre-Visit Chart Review  For Appointment Scheduled on 8-22-17    Health Maintenance Due   Topic Date Due    TETANUS VACCINE  07/21/1948    Eye Exam  10/30/2016    Influenza Vaccine  08/01/2017

## 2017-08-22 ENCOUNTER — OFFICE VISIT (OUTPATIENT)
Dept: FAMILY MEDICINE | Facility: CLINIC | Age: 82
End: 2017-08-22
Payer: MEDICARE

## 2017-08-22 ENCOUNTER — LAB VISIT (OUTPATIENT)
Dept: LAB | Facility: HOSPITAL | Age: 82
End: 2017-08-22
Attending: INTERNAL MEDICINE
Payer: MEDICARE

## 2017-08-22 VITALS
SYSTOLIC BLOOD PRESSURE: 144 MMHG | DIASTOLIC BLOOD PRESSURE: 62 MMHG | BODY MASS INDEX: 24.06 KG/M2 | HEIGHT: 63 IN | TEMPERATURE: 98 F | HEART RATE: 57 BPM | WEIGHT: 135.81 LBS

## 2017-08-22 DIAGNOSIS — I15.2 HYPERTENSION ASSOCIATED WITH DIABETES: ICD-10-CM

## 2017-08-22 DIAGNOSIS — I71.40 ABDOMINAL AORTIC ANEURYSM WITHOUT RUPTURE: ICD-10-CM

## 2017-08-22 DIAGNOSIS — E11.59 HYPERTENSION ASSOCIATED WITH DIABETES: ICD-10-CM

## 2017-08-22 DIAGNOSIS — E11.69 HYPERLIPIDEMIA ASSOCIATED WITH TYPE 2 DIABETES MELLITUS: ICD-10-CM

## 2017-08-22 DIAGNOSIS — Z59.9 FINANCIAL DIFFICULTIES: ICD-10-CM

## 2017-08-22 DIAGNOSIS — D50.0 IRON DEFICIENCY ANEMIA DUE TO CHRONIC BLOOD LOSS: ICD-10-CM

## 2017-08-22 DIAGNOSIS — E03.9 HYPOTHYROIDISM, UNSPECIFIED TYPE: ICD-10-CM

## 2017-08-22 DIAGNOSIS — E11.8 TYPE 2 DIABETES MELLITUS WITH COMPLICATION, WITHOUT LONG-TERM CURRENT USE OF INSULIN: ICD-10-CM

## 2017-08-22 DIAGNOSIS — N18.30 CKD (CHRONIC KIDNEY DISEASE), STAGE III: ICD-10-CM

## 2017-08-22 DIAGNOSIS — E78.5 HYPERLIPIDEMIA ASSOCIATED WITH TYPE 2 DIABETES MELLITUS: ICD-10-CM

## 2017-08-22 DIAGNOSIS — J43.8 OTHER EMPHYSEMA: Primary | ICD-10-CM

## 2017-08-22 PROCEDURE — 99999 PR PBB SHADOW E&M-EST. PATIENT-LVL III: CPT | Mod: PBBFAC,,, | Performed by: FAMILY MEDICINE

## 2017-08-22 PROCEDURE — 1126F AMNT PAIN NOTED NONE PRSNT: CPT | Mod: S$GLB,,, | Performed by: FAMILY MEDICINE

## 2017-08-22 PROCEDURE — 99499 UNLISTED E&M SERVICE: CPT | Mod: S$PBB,,, | Performed by: FAMILY MEDICINE

## 2017-08-22 PROCEDURE — 3008F BODY MASS INDEX DOCD: CPT | Mod: S$GLB,,, | Performed by: FAMILY MEDICINE

## 2017-08-22 PROCEDURE — 1159F MED LIST DOCD IN RCRD: CPT | Mod: S$GLB,,, | Performed by: FAMILY MEDICINE

## 2017-08-22 PROCEDURE — 80053 COMPREHEN METABOLIC PANEL: CPT

## 2017-08-22 PROCEDURE — 83540 ASSAY OF IRON: CPT

## 2017-08-22 PROCEDURE — 99214 OFFICE O/P EST MOD 30 MIN: CPT | Mod: S$GLB,,, | Performed by: FAMILY MEDICINE

## 2017-08-22 PROCEDURE — 82728 ASSAY OF FERRITIN: CPT

## 2017-08-22 PROCEDURE — 85025 COMPLETE CBC W/AUTO DIFF WBC: CPT

## 2017-08-22 RX ORDER — IPRATROPIUM BROMIDE AND ALBUTEROL SULFATE 2.5; .5 MG/3ML; MG/3ML
3 SOLUTION RESPIRATORY (INHALATION)
Qty: 180 ML | Status: SHIPPED | OUTPATIENT
Start: 2017-08-22 | End: 2018-08-25 | Stop reason: SDUPTHER

## 2017-08-22 SDOH — SOCIAL DETERMINANTS OF HEALTH (SDOH): PROBLEM RELATED TO HOUSING AND ECONOMIC CIRCUMSTANCES, UNSPECIFIED: Z59.9

## 2017-08-22 NOTE — PROGRESS NOTES
Subjective:       Patient ID: Blaire Cage is a 87 y.o. female.    Chief Complaint: Follow-up    Patient Active Problem List   Diagnosis    Diabetic neuropathy, type II diabetes mellitus    CKD (chronic kidney disease), stage III, eGFR 39, progressive 31    Hypothyroid    Hypertension associated with diabetes    Hyperlipidemia associated with type 2 diabetes mellitus    Type 2 diabetes mellitus with complication    Groin pain    Right carotid bruit    COPD (chronic obstructive pulmonary disease)    Anxiety    Tobacco dependence in remission    Abdominal aortic aneurysm without rupture    Hip arthritis    Degenerative spinal arthritis    Osteoporosis    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 10/2016, 11/2016    Body mass index (BMI) 23.0-23.9, adult, today 24.1    Iron deficiency anemia due to chronic blood loss    Proteinuria due to type 2 diabetes mellitus    History of syncope in childhood    Prerenal azotemia    Drug-induced constipation   Has had some erratic spikes in BP and htn crisis.  Card has added clonidine 0.1mg bid and d/c'd hctz.      Type 2 dm- needs eye exam but can't afford co-pays.  Has had a lot of medical bills lately  HPI  Review of Systems   Constitutional: Negative for fatigue and unexpected weight change.   Respiratory: Negative for chest tightness and shortness of breath.    Cardiovascular: Negative for chest pain, palpitations and leg swelling.   Gastrointestinal: Negative for abdominal pain.   Musculoskeletal: Negative for arthralgias.   Neurological: Negative for dizziness, syncope, light-headedness and headaches.       Objective:      Physical Exam   Constitutional: She is oriented to person, place, and time. She appears well-developed and  well-nourished.   Cardiovascular: Normal rate, regular rhythm and normal heart sounds.    Pulmonary/Chest: Effort normal and breath sounds normal.   Musculoskeletal: She exhibits no edema.   Neurological: She is alert and oriented to person, place, and time.   Skin: Skin is warm and dry.   Psychiatric: She has a normal mood and affect.   Nursing note and vitals reviewed.      Assessment:       1. Other emphysema    2. Financial difficulties    3. Abdominal aortic aneurysm without rupture    4. Hyperlipidemia associated with type 2 diabetes mellitus    5. Hypertension associated with diabetes    6. CKD (chronic kidney disease), stage III, eGFR 39, progressive 31    7. Iron deficiency anemia due to chronic blood loss    8. Hypothyroidism, unspecified type    9. Type 2 diabetes mellitus with complication, without long-term current use of insulin        Plan:     1. Other emphysema  Controlled on current medications.  Continue current medications.    - albuterol-ipratropium 2.5mg-0.5mg/3mL (DUO-NEB) 0.5 mg-3 mg(2.5 mg base)/3 mL nebulizer solution; Take 3 mLs by nebulization every 6 (six) hours while awake. Rescue  Dispense: 180 mL; Refill: prn    2. Financial difficulties  Refer to apply for [facility] patient assistance program  - Ambulatory referral to Outpatient Case Management    3. Abdominal aortic aneurysm without rupture  Last screening 2/17.  Followed by Dr. Tate    4. Hyperlipidemia associated with type 2 diabetes mellitus  Stable condition.  Continue current medications.  Will adjust based on lab findings or if condition changes.      5. Hypertension associated with diabetes  At gaol < 150/90. Controlled on current medications.  Continue current medications.      6. CKD (chronic kidney disease), stage III, eGFR 39, progressive 31  Stable and chronic.  Will continue to monitor q3-6 months and control chronic conditions as optimally as possible to preserve function.      7. Iron deficiency anemia due to  chronic blood loss  Stable condition.  Continue current medications.  Will adjust based on lab findings or if condition changes.      8. Hypothyroidism, unspecified type  Stable condition.  Continue current medications.  Will adjust based on lab findings or if condition changes.      9. Type 2 diabetes mellitus with complication, without long-term current use of insulin  Stable condition.  Continue current medications.  Will adjust based on lab findings or if condition changes.          [facility] goal documentation:  Patient readiness: acceptance and barriers:readiness  During the course of the visit the patient was educated and counseled about the following: Diabetes:  Discussed general issues about diabetes pathophysiology and management.  Hypertension:   Dietary sodium restriction.  Regular aerobic exercise.  Goals: Diabetes: Maintain Hemoglobin A1C below 7 and Hypertension: Reduce Blood Pressure  Goal/Outcomes met:Hypertension and type 2 DM  The following self management tools provided:none  Patient Instructions (the written plan) was given to the patient/family: Yes  Time spent with patient: 20 minutes    Patient with be reevaluated in 3 months or sooner prn    Greater than 50% of this visit was spent counseling as described in above documentation:Yes

## 2017-08-22 NOTE — PATIENT INSTRUCTIONS
Controlling High Blood Pressure  High blood pressure (hypertension) is often called the silent killer. This is because many people who have it dont know it. High blood pressure is defined as 140/90 mm Hg or higher. Know your blood pressure and remember to check it regularly. Doing so can save your life. Here are some things you can do to help control your blood pressure.    Choose heart-healthy foods  · Select low-salt, low-fat foods. Limit sodium intake to 2,400 mg per day or the amount suggested by your healthcare provider.  · Limit canned, dried, cured, packaged, and fast foods. These can contain a lot of salt.  · Eat 8 to 10 servings of fruits and vegetables every day.  · Choose lean meats, fish, or chicken.  · Eat whole-grain pasta, brown rice, and beans.  · Eat 2 to 3 servings of low-fat or fat-free dairy products.  · Ask your doctor about the DASH eating plan. This plan helps reduce blood pressure.  · When you go to a restaurant, ask that your meal be prepared with no added salt.  Maintain a healthy weight  · Ask your healthcare provider how many calories to eat a day. Then stick to that number.  · Ask your healthcare provider what weight range is healthiest for you. If you are overweight, a weight loss of only 3% to 5% of your body weight can help lower blood pressure. Generally, a good weight loss goal is to lose 10% of your body weight in a year.  · Limit snacks and sweets.  · Get regular exercise.  Get up and get active  · Choose activities you enjoy. Find ones you can do with friends or family. This includes bicycling, dancing, walking, and jogging.  · Park farther away from building entrances.  · Use stairs instead of the elevator.  · When you can, walk or bike instead of driving.  · [place] leaves, garden, or do household repairs.  · Be active at a moderate to vigorous level of physical activity for at least 40 minutes for a minimum of 3 to 4 days a week.   Manage stress  · Make time to relax and enjoy  life. Find time to laugh.  · Communicate your concerns with your loved ones and your healthcare provider.  · Visit with family and friends, and keep up with hobbies.  Limit alcohol and quit smoking  · Men should have no more than 2 drinks per day.  · Women should have no more than 1 drink per day.  · Talk with your healthcare provider about quitting smoking. Smoking significantly increases your risk for heart disease and stroke. Ask your healthcare provider about community smoking cessation programs and other options.  Medicines  If lifestyle changes arent enough, your healthcare provider may prescribe high blood pressure medicine. Take all medicines as prescribed. If you have any questions about your medicines, ask your healthcare provider before stopping or changing them.   Date Last Reviewed: 4/27/2016  © 8398-2701 [employer]. [address] [place] [address], [place] [address]. All rights reserved. This information is not intended as a substitute for professional medical care. Always follow your healthcare professional's instructions.

## 2017-08-23 ENCOUNTER — LAB VISIT (OUTPATIENT)
Dept: LAB | Facility: HOSPITAL | Age: 82
End: 2017-08-23
Attending: INTERNAL MEDICINE
Payer: MEDICARE

## 2017-08-23 ENCOUNTER — TELEPHONE (OUTPATIENT)
Dept: CARDIOLOGY | Facility: CLINIC | Age: 82
End: 2017-08-23

## 2017-08-23 DIAGNOSIS — M81.0 SENILE OSTEOPOROSIS: ICD-10-CM

## 2017-08-23 DIAGNOSIS — N18.30 CHRONIC KIDNEY DISEASE, STAGE III (MODERATE): ICD-10-CM

## 2017-08-23 DIAGNOSIS — I10 ESSENTIAL HYPERTENSION, MALIGNANT: ICD-10-CM

## 2017-08-23 DIAGNOSIS — D64.9 ANEMIA, UNSPECIFIED: ICD-10-CM

## 2017-08-23 DIAGNOSIS — E11.9 DIABETES MELLITUS WITHOUT COMPLICATION: ICD-10-CM

## 2017-08-23 DIAGNOSIS — N20.0 URIC ACID NEPHROLITHIASIS: ICD-10-CM

## 2017-08-23 DIAGNOSIS — M81.0 SENILE OSTEOPOROSIS: Primary | ICD-10-CM

## 2017-08-23 LAB
ALBUMIN SERPL BCP-MCNC: 3.3 G/DL
ALP SERPL-CCNC: 66 U/L
ALT SERPL W/O P-5'-P-CCNC: 13 U/L
ANION GAP SERPL CALC-SCNC: 10 MMOL/L
AST SERPL-CCNC: 20 U/L
BACTERIA #/AREA URNS AUTO: NORMAL /HPF
BASOPHILS # BLD AUTO: 0.01 K/UL
BASOPHILS NFR BLD: 0.2 %
BILIRUB SERPL-MCNC: 0.3 MG/DL
BILIRUB UR QL STRIP: NEGATIVE
BUN SERPL-MCNC: 47 MG/DL
CALCIUM SERPL-MCNC: 9.7 MG/DL
CHLORIDE SERPL-SCNC: 110 MMOL/L
CLARITY UR REFRACT.AUTO: CLEAR
CO2 SERPL-SCNC: 22 MMOL/L
COLOR UR AUTO: YELLOW
CREAT SERPL-MCNC: 1.7 MG/DL
CREAT UR-MCNC: 37 MG/DL
DIFFERENTIAL METHOD: ABNORMAL
EOSINOPHIL # BLD AUTO: 0.3 K/UL
EOSINOPHIL NFR BLD: 5.6 %
ERYTHROCYTE [DISTWIDTH] IN BLOOD BY AUTOMATED COUNT: 13.5 %
EST. GFR  (AFRICAN AMERICAN): 30.8 ML/MIN/1.73 M^2
EST. GFR  (NON AFRICAN AMERICAN): 26.7 ML/MIN/1.73 M^2
FERRITIN SERPL-MCNC: 103 NG/ML
GLUCOSE SERPL-MCNC: 114 MG/DL
GLUCOSE UR QL STRIP: NEGATIVE
HCT VFR BLD AUTO: 32.3 %
HGB BLD-MCNC: 11 G/DL
HGB UR QL STRIP: NEGATIVE
HYALINE CASTS UR QL AUTO: 0 /LPF
IRON SERPL-MCNC: 103 UG/DL
KETONES UR QL STRIP: NEGATIVE
LEUKOCYTE ESTERASE UR QL STRIP: ABNORMAL
LYMPHOCYTES # BLD AUTO: 1.9 K/UL
LYMPHOCYTES NFR BLD: 33.2 %
MCH RBC QN AUTO: 32.2 PG
MCHC RBC AUTO-ENTMCNC: 34.1 G/DL
MCV RBC AUTO: 94 FL
MICROSCOPIC COMMENT: NORMAL
MONOCYTES # BLD AUTO: 0.6 K/UL
MONOCYTES NFR BLD: 10.1 %
NEUTROPHILS # BLD AUTO: 2.9 K/UL
NEUTROPHILS NFR BLD: 50.7 %
NITRITE UR QL STRIP: NEGATIVE
PH UR STRIP: 5 [PH] (ref 5–8)
PLATELET # BLD AUTO: 192 K/UL
PMV BLD AUTO: 10.9 FL
POTASSIUM SERPL-SCNC: 5.3 MMOL/L
PROT SERPL-MCNC: 6.8 G/DL
PROT UR QL STRIP: ABNORMAL
PROT UR-MCNC: 28 MG/DL
PROT/CREAT RATIO, UR: 0.76
RBC # BLD AUTO: 3.42 M/UL
RBC #/AREA URNS AUTO: 0 /HPF (ref 0–4)
SATURATED IRON: 31 %
SODIUM SERPL-SCNC: 142 MMOL/L
SP GR UR STRIP: 1.01 (ref 1–1.03)
SQUAMOUS #/AREA URNS AUTO: 0 /HPF
TOTAL IRON BINDING CAPACITY: 329 UG/DL
TRANSFERRIN SERPL-MCNC: 222 MG/DL
URN SPEC COLLECT METH UR: ABNORMAL
UROBILINOGEN UR STRIP-ACNC: NEGATIVE EU/DL
WBC # BLD AUTO: 5.73 K/UL
WBC #/AREA URNS AUTO: 5 /HPF (ref 0–5)

## 2017-08-23 PROCEDURE — 81001 URINALYSIS AUTO W/SCOPE: CPT

## 2017-08-23 PROCEDURE — 82570 ASSAY OF URINE CREATININE: CPT

## 2017-08-23 NOTE — TELEPHONE ENCOUNTER
"Spoke with Ms. Niles, She stated she seen Dr. Mora today and noticed on her AVS that the clonidine says "Take 1 tablet (0.1 mg total) by mouth 2 (two) times daily. Take one tablet by mouth every hour as needed for systolic blood pressure greater than 180.  Do not take more than 3 tablets in 24 hours. - Oral".    But her medication bottle says take 1 tablet by mouth every 12 hours for systolic BP >180. Informed her I would clarify this with Dr. Trevino. Patient verbalized understanding. No further issues noted      "

## 2017-08-23 NOTE — TELEPHONE ENCOUNTER
It is good if she has not needed clonidine lately. Can she provide us a log of her recent BP measurements

## 2017-08-23 NOTE — TELEPHONE ENCOUNTER
----- Message from Vandana Lala sent at 8/23/2017  1:04 PM CDT -----  Contact: self  Patient would like to speak to a nurse regarding her medications  She is confused   Please call  to advise.    Thanks

## 2017-08-25 ENCOUNTER — TELEPHONE (OUTPATIENT)
Dept: FAMILY MEDICINE | Facility: CLINIC | Age: 82
End: 2017-08-25

## 2017-08-25 ENCOUNTER — OUTPATIENT CASE MANAGEMENT (OUTPATIENT)
Dept: ADMINISTRATIVE | Facility: OTHER | Age: 82
End: 2017-08-25

## 2017-08-25 NOTE — TELEPHONE ENCOUNTER
Spoke with pt. Asked if she was keeping a log of her bp. She stated that she was but she would start. Advised her to log her bp for a week and give us a call with it. Patient verbalized understanding. No further issues noted.

## 2017-08-25 NOTE — TELEPHONE ENCOUNTER
Spoke with patient about lab results and to follow up q 3-6 months.Patient stated understanding and to avoid excess potassium.

## 2017-08-25 NOTE — PROGRESS NOTES
Please note the following patient's information has been forwarded to [facility] for case mgmt or  by Outpatient Case Management.    Please see the media section of patient's chart for additional details.    Please contact Ext. [phone] with any questions.    Thank you,    Mady Suazo, SSC

## 2017-09-07 ENCOUNTER — TELEPHONE (OUTPATIENT)
Dept: CARDIOLOGY | Facility: CLINIC | Age: 82
End: 2017-09-07

## 2017-09-07 NOTE — TELEPHONE ENCOUNTER
Called and left message for Ms. Cage, Per Dr. Trevino her BP log looks good, and to find out exactly how she is taking her medication. Will continue to call.

## 2017-09-11 ENCOUNTER — HOSPITAL ENCOUNTER (INPATIENT)
Facility: HOSPITAL | Age: 82
LOS: 3 days | Discharge: HOME | DRG: 190 | End: 2017-09-14
Attending: EMERGENCY MEDICINE | Admitting: EMERGENCY MEDICINE
Payer: MEDICARE

## 2017-09-11 DIAGNOSIS — E87.5 HYPERKALEMIA: ICD-10-CM

## 2017-09-11 DIAGNOSIS — J18.9 PNEUMONIA OF RIGHT LOWER LOBE DUE TO INFECTIOUS ORGANISM: Primary | ICD-10-CM

## 2017-09-11 LAB
ALBUMIN SERPL BCP-MCNC: 3.5 G/DL
ALP SERPL-CCNC: 64 U/L
ALT SERPL W/O P-5'-P-CCNC: 16 U/L
ANION GAP SERPL CALC-SCNC: 7 MMOL/L
AST SERPL-CCNC: 26 U/L
BACTERIA #/AREA URNS HPF: NORMAL /HPF
BASOPHILS # BLD AUTO: 0 K/UL
BASOPHILS NFR BLD: 0.4 %
BILIRUB SERPL-MCNC: 0.4 MG/DL
BILIRUB UR QL STRIP: NEGATIVE
BUN SERPL-MCNC: 39 MG/DL
CALCIUM SERPL-MCNC: 9.4 MG/DL
CHLORIDE SERPL-SCNC: 103 MMOL/L
CLARITY UR: CLEAR
CO2 SERPL-SCNC: 25 MMOL/L
COLOR UR: YELLOW
CREAT SERPL-MCNC: 1.7 MG/DL
DIFFERENTIAL METHOD: ABNORMAL
EOSINOPHIL # BLD AUTO: 0.3 K/UL
EOSINOPHIL NFR BLD: 4.2 %
ERYTHROCYTE [DISTWIDTH] IN BLOOD BY AUTOMATED COUNT: 14.1 %
EST. GFR  (AFRICAN AMERICAN): 31 ML/MIN/1.73 M^2
EST. GFR  (NON AFRICAN AMERICAN): 27 ML/MIN/1.73 M^2
GLUCOSE SERPL-MCNC: 72 MG/DL
GLUCOSE UR QL STRIP: NEGATIVE
HCT VFR BLD AUTO: 32.7 %
HGB BLD-MCNC: 11.2 G/DL
HGB UR QL STRIP: NEGATIVE
HYALINE CASTS #/AREA URNS LPF: 0 /LPF
INR PPP: 1
KETONES UR QL STRIP: NEGATIVE
LEUKOCYTE ESTERASE UR QL STRIP: NEGATIVE
LYMPHOCYTES # BLD AUTO: 1.7 K/UL
LYMPHOCYTES NFR BLD: 25.1 %
MAGNESIUM SERPL-MCNC: 2.3 MG/DL
MCH RBC QN AUTO: 32.5 PG
MCHC RBC AUTO-ENTMCNC: 34.2 G/DL
MCV RBC AUTO: 95 FL
MICROSCOPIC COMMENT: NORMAL
MONOCYTES # BLD AUTO: 0.6 K/UL
MONOCYTES NFR BLD: 8.7 %
NEUTROPHILS # BLD AUTO: 4.1 K/UL
NEUTROPHILS NFR BLD: 61.6 %
NITRITE UR QL STRIP: NEGATIVE
PH UR STRIP: 6 [PH] (ref 5–8)
PHOSPHATE SERPL-MCNC: 4.2 MG/DL
PLATELET # BLD AUTO: 172 K/UL
PMV BLD AUTO: 8.8 FL
POCT GLUCOSE: 114 MG/DL (ref 70–110)
POCT GLUCOSE: 87 MG/DL (ref 70–110)
POTASSIUM SERPL-SCNC: 5.7 MMOL/L
PROT SERPL-MCNC: 7 G/DL
PROT UR QL STRIP: ABNORMAL
PROTHROMBIN TIME: 10 SEC
RBC # BLD AUTO: 3.43 M/UL
RBC #/AREA URNS HPF: 0 /HPF (ref 0–4)
SODIUM SERPL-SCNC: 135 MMOL/L
SP GR UR STRIP: <=1.005 (ref 1–1.03)
SQUAMOUS #/AREA URNS HPF: 1 /HPF
T4 FREE SERPL-MCNC: 0.67 NG/DL
TROPONIN I SERPL DL<=0.01 NG/ML-MCNC: <0.006 NG/ML
TSH SERPL DL<=0.005 MIU/L-ACNC: 16.1 UIU/ML
URN SPEC COLLECT METH UR: ABNORMAL
UROBILINOGEN UR STRIP-ACNC: NEGATIVE EU/DL
WBC # BLD AUTO: 6.7 K/UL
WBC #/AREA URNS HPF: 1 /HPF (ref 0–5)

## 2017-09-11 PROCEDURE — 36415 COLL VENOUS BLD VENIPUNCTURE: CPT

## 2017-09-11 PROCEDURE — 84484 ASSAY OF TROPONIN QUANT: CPT

## 2017-09-11 PROCEDURE — 63600175 PHARM REV CODE 636 W HCPCS: Performed by: EMERGENCY MEDICINE

## 2017-09-11 PROCEDURE — 84100 ASSAY OF PHOSPHORUS: CPT

## 2017-09-11 PROCEDURE — 96361 HYDRATE IV INFUSION ADD-ON: CPT

## 2017-09-11 PROCEDURE — 25000003 PHARM REV CODE 250: Performed by: EMERGENCY MEDICINE

## 2017-09-11 PROCEDURE — 99285 EMERGENCY DEPT VISIT HI MDM: CPT

## 2017-09-11 PROCEDURE — 80053 COMPREHEN METABOLIC PANEL: CPT

## 2017-09-11 PROCEDURE — 96375 TX/PRO/DX INJ NEW DRUG ADDON: CPT

## 2017-09-11 PROCEDURE — 85025 COMPLETE CBC W/AUTO DIFF WBC: CPT

## 2017-09-11 PROCEDURE — 96365 THER/PROPH/DIAG IV INF INIT: CPT

## 2017-09-11 PROCEDURE — 83735 ASSAY OF MAGNESIUM: CPT

## 2017-09-11 PROCEDURE — 83036 HEMOGLOBIN GLYCOSYLATED A1C: CPT

## 2017-09-11 PROCEDURE — 84443 ASSAY THYROID STIM HORMONE: CPT

## 2017-09-11 PROCEDURE — 85610 PROTHROMBIN TIME: CPT

## 2017-09-11 PROCEDURE — 84439 ASSAY OF FREE THYROXINE: CPT

## 2017-09-11 PROCEDURE — 12000002 HC ACUTE/MED SURGE SEMI-PRIVATE ROOM

## 2017-09-11 PROCEDURE — 93005 ELECTROCARDIOGRAM TRACING: CPT

## 2017-09-11 PROCEDURE — 82962 GLUCOSE BLOOD TEST: CPT

## 2017-09-11 PROCEDURE — 81000 URINALYSIS NONAUTO W/SCOPE: CPT

## 2017-09-11 RX ORDER — IBUPROFEN 200 MG
24 TABLET ORAL
Status: DISCONTINUED | OUTPATIENT
Start: 2017-09-11 | End: 2017-09-14 | Stop reason: HOSPADM

## 2017-09-11 RX ORDER — IPRATROPIUM BROMIDE AND ALBUTEROL SULFATE 2.5; .5 MG/3ML; MG/3ML
3 SOLUTION RESPIRATORY (INHALATION)
Status: DISCONTINUED | OUTPATIENT
Start: 2017-09-12 | End: 2017-09-14 | Stop reason: HOSPADM

## 2017-09-11 RX ORDER — CALCIUM CARBONATE 500(1250)
500 TABLET ORAL 2 TIMES DAILY
Status: DISCONTINUED | OUTPATIENT
Start: 2017-09-12 | End: 2017-09-14 | Stop reason: HOSPADM

## 2017-09-11 RX ORDER — NITROGLYCERIN 0.4 MG/1
0.4 TABLET SUBLINGUAL EVERY 5 MIN PRN
Status: DISCONTINUED | OUTPATIENT
Start: 2017-09-12 | End: 2017-09-14 | Stop reason: HOSPADM

## 2017-09-11 RX ORDER — LOSARTAN POTASSIUM 25 MG/1
100 TABLET ORAL DAILY
Status: DISCONTINUED | OUTPATIENT
Start: 2017-09-12 | End: 2017-09-14 | Stop reason: HOSPADM

## 2017-09-11 RX ORDER — GLUCAGON 1 MG
1 KIT INJECTION
Status: DISCONTINUED | OUTPATIENT
Start: 2017-09-11 | End: 2017-09-14 | Stop reason: HOSPADM

## 2017-09-11 RX ORDER — ENOXAPARIN SODIUM 100 MG/ML
30 INJECTION SUBCUTANEOUS EVERY 24 HOURS
Status: DISCONTINUED | OUTPATIENT
Start: 2017-09-12 | End: 2017-09-14 | Stop reason: HOSPADM

## 2017-09-11 RX ORDER — ONDANSETRON 2 MG/ML
4 INJECTION INTRAMUSCULAR; INTRAVENOUS EVERY 8 HOURS PRN
Status: DISCONTINUED | OUTPATIENT
Start: 2017-09-11 | End: 2017-09-14 | Stop reason: HOSPADM

## 2017-09-11 RX ORDER — AMLODIPINE BESYLATE 5 MG/1
10 TABLET ORAL DAILY
Status: DISCONTINUED | OUTPATIENT
Start: 2017-09-12 | End: 2017-09-12

## 2017-09-11 RX ORDER — ASPIRIN 81 MG/1
81 TABLET ORAL DAILY
Status: DISCONTINUED | OUTPATIENT
Start: 2017-09-12 | End: 2017-09-14 | Stop reason: HOSPADM

## 2017-09-11 RX ORDER — GABAPENTIN 300 MG/1
300 CAPSULE ORAL NIGHTLY
Status: DISCONTINUED | OUTPATIENT
Start: 2017-09-12 | End: 2017-09-14 | Stop reason: HOSPADM

## 2017-09-11 RX ORDER — ASCORBIC ACID 500 MG
500 TABLET ORAL DAILY
Status: DISCONTINUED | OUTPATIENT
Start: 2017-09-12 | End: 2017-09-14 | Stop reason: HOSPADM

## 2017-09-11 RX ORDER — FLUTICASONE PROPIONATE 50 MCG
1 SPRAY, SUSPENSION (ML) NASAL DAILY
Status: DISCONTINUED | OUTPATIENT
Start: 2017-09-12 | End: 2017-09-14 | Stop reason: HOSPADM

## 2017-09-11 RX ORDER — SIMVASTATIN 40 MG/1
40 TABLET, FILM COATED ORAL NIGHTLY
Status: DISCONTINUED | OUTPATIENT
Start: 2017-09-12 | End: 2017-09-14 | Stop reason: HOSPADM

## 2017-09-11 RX ORDER — IBUPROFEN 200 MG
16 TABLET ORAL
Status: DISCONTINUED | OUTPATIENT
Start: 2017-09-11 | End: 2017-09-14 | Stop reason: HOSPADM

## 2017-09-11 RX ORDER — INSULIN ASPART 100 [IU]/ML
0-5 INJECTION, SOLUTION INTRAVENOUS; SUBCUTANEOUS
Status: DISCONTINUED | OUTPATIENT
Start: 2017-09-11 | End: 2017-09-14 | Stop reason: HOSPADM

## 2017-09-11 RX ORDER — AMOXICILLIN 250 MG
1 CAPSULE ORAL DAILY PRN
Status: DISCONTINUED | OUTPATIENT
Start: 2017-09-11 | End: 2017-09-14 | Stop reason: HOSPADM

## 2017-09-11 RX ORDER — LEVOTHYROXINE SODIUM 100 UG/1
100 TABLET ORAL DAILY
Status: DISCONTINUED | OUTPATIENT
Start: 2017-09-12 | End: 2017-09-13

## 2017-09-11 RX ORDER — FERROUS SULFATE 325(65) MG
325 TABLET, DELAYED RELEASE (ENTERIC COATED) ORAL DAILY
Status: DISCONTINUED | OUTPATIENT
Start: 2017-09-12 | End: 2017-09-14 | Stop reason: HOSPADM

## 2017-09-11 RX ORDER — METRONIDAZOLE 500 MG/100ML
500 INJECTION, SOLUTION INTRAVENOUS
Status: DISCONTINUED | OUTPATIENT
Start: 2017-09-12 | End: 2017-09-14 | Stop reason: HOSPADM

## 2017-09-11 RX ORDER — ACETAMINOPHEN 325 MG/1
650 TABLET ORAL EVERY 6 HOURS PRN
Status: DISCONTINUED | OUTPATIENT
Start: 2017-09-11 | End: 2017-09-14 | Stop reason: HOSPADM

## 2017-09-11 RX ORDER — BISACODYL 10 MG
10 SUPPOSITORY, RECTAL RECTAL ONCE
Status: COMPLETED | OUTPATIENT
Start: 2017-09-12 | End: 2017-09-12

## 2017-09-11 RX ADMIN — CEFTRIAXONE 1 G: 1 INJECTION, SOLUTION INTRAVENOUS at 06:09

## 2017-09-11 RX ADMIN — SODIUM POLYSTYRENE SULFONATE 30 G: 15 SUSPENSION ORAL; RECTAL at 10:09

## 2017-09-11 RX ADMIN — SODIUM CHLORIDE 1000 ML: 0.9 INJECTION, SOLUTION INTRAVENOUS at 03:09

## 2017-09-11 RX ADMIN — AZITHROMYCIN MONOHYDRATE 500 MG: 500 INJECTION, POWDER, LYOPHILIZED, FOR SOLUTION INTRAVENOUS at 06:09

## 2017-09-11 NOTE — ED PROVIDER NOTES
"Encounter Date: 9/11/2017    SCRIBE #1 NOTE: I, Debbie Glynn , am scribing for, and in the presence of,  Dr. Guadalupe . I have scribed the entire note.       History     Chief Complaint   Patient presents with    Extremity Weakness     tingling in both feet / not feeling good x 2 days          09/11/2017  2:36 PM     Chief Complaint: Paraesthesia in BLE, chest discomfort        The patient is a 87 y.o. female who is presenting with the gradual onset of worsening bilateral LE paraesthesia that began x 1 week ago that extends from the knee inferiorly. No exacerbating or mitigating factors. She endorses "chest discomfort" x 1 day that feels like "something is in my throat and chest". No mitigating or exacerbating factors. She denies associated nausea, emesis, but states that she "feels bad". Pertinent PMHx includes long term anticoagulant use, COPD, HTN, and thyroid disease. No pertinent past surgical hx.                The history is provided by the patient and medical records.     Review of patient's allergies indicates:   Allergen Reactions    Carvedilol Other (See Comments)     Bradycardia and syncope    Boniva [ibandronate]     Codeine     Hydralazine analogues     Iodinated contrast- oral and iv dye Hives    Lisinopril     Morphine      Past Medical History:   Diagnosis Date    Anticoagulant long-term use     aspirin    COPD (chronic obstructive pulmonary disease)     COPD with acute exacerbation 1/9/2015    Diabetes mellitus type II     Encounter for blood transfusion     Hip arthritis 3/1/2016    Hyperlipidemia     Hypertension     Thyroid disease     hypothyroid     Past Surgical History:   Procedure Laterality Date    APPENDECTOMY      FRACTURE SURGERY      right hip     HERNIA REPAIR      groin    HYSTERECTOMY      VARICOSE VEIN SURGERY       Family History   Problem Relation Age of Onset    Hypertension Mother     Diabetes Sister     Diabetes Brother     Kidney disease Son  " "    Social History   Substance Use Topics    Smoking status: Former Smoker     Years: 44.00     Types: Cigarettes     Quit date: 4/30/2015    Smokeless tobacco: Never Used      Comment: 1/08/2015    Alcohol use No     Review of Systems   Constitutional:        "not feeling good"   HENT: Negative for sore throat.    Cardiovascular: Positive for chest pain ("discomfort").   Gastrointestinal: Negative for nausea.   Musculoskeletal: Negative for back pain.   Skin: Negative for rash.   Neurological: Negative for weakness.        BLE paraesthesia extending from the knee down    Hematological: Does not bruise/bleed easily.   Psychiatric/Behavioral: Negative for decreased concentration.   All other systems reviewed and are negative.      Physical Exam     Initial Vitals [09/11/17 1411]   BP Pulse Resp Temp SpO2   (!) 152/69 62 18 98 °F (36.7 °C) 97 %      MAP       96.67         Physical Exam    Nursing note and vitals reviewed.  Constitutional: She appears well-developed and well-nourished. She is not diaphoretic. No distress.   HENT:   Head: Normocephalic and atraumatic.   Mouth/Throat: Oropharynx is clear and moist.   Eyes: EOM are normal.   Neck: Normal range of motion. Neck supple.   Cardiovascular: Normal rate, regular rhythm, normal heart sounds and intact distal pulses.   Bilateral lower extremities are well-perfused, normal dorsal pedal pulse   Pulmonary/Chest: Breath sounds normal. No respiratory distress.   Abdominal: Soft. She exhibits no distension. There is no tenderness.   Musculoskeletal: Normal range of motion.   Neurological: She is alert and oriented to person, place, and time.   Skin: Skin is warm and dry.   Psychiatric: She has a normal mood and affect. Her behavior is normal. Judgment and thought content normal.         ED Course   Procedures  Labs Reviewed   CBC W/ AUTO DIFFERENTIAL - Abnormal; Notable for the following:        Result Value    RBC 3.43 (*)     Hemoglobin 11.2 (*)     Hematocrit " 32.7 (*)     MCH 32.5 (*)     MPV 8.8 (*)     All other components within normal limits   COMPREHENSIVE METABOLIC PANEL - Abnormal; Notable for the following:     Sodium 135 (*)     Potassium 5.7 (*)     BUN, Bld 39 (*)     Creatinine 1.7 (*)     Anion Gap 7 (*)     eGFR if  31 (*)     eGFR if non  27 (*)     All other components within normal limits   URINALYSIS - Abnormal; Notable for the following:     Specific Gravity, UA <=1.005 (*)     Protein, UA 1+ (*)     All other components within normal limits   TSH - Abnormal; Notable for the following:     TSH 16.098 (*)     All other components within normal limits   PROTIME-INR   TROPONIN I   MAGNESIUM   PHOSPHORUS   URINALYSIS MICROSCOPIC   T4, FREE   POCT GLUCOSE   POCT GLUCOSE MONITORING CONTINUOUS             Medical Decision Making:   History:   I obtained history from: someone other than patient.  Old Medical Records: I decided to obtain old medical records.  Clinical Tests:   Lab Tests: Ordered and Reviewed  Radiological Study: Ordered and Reviewed  Medical Tests: Ordered and Reviewed            Scribe Attestation:   Scribe #1: I performed the above scribed service and the documentation accurately describes the services I performed. I attest to the accuracy of the note.    Attending Attestation:           Physician Attestation for Scribe:  Physician Attestation Statement for Scribe #1: I, Dr. Guadalupe , reviewed documentation, as scribed by Debbie Glynn  in my presence, and it is both accurate and complete.                 ED Course as of Sep 11 1904   Mon Sep 11, 2017   1701 87-year-old female presents for general malaise lower extremity paresthesias and an unusual feeling in her chest.  X-ray demonstrates a right basilar infiltrate, potassium is up a little bit case discussed with Dr. Anguiano for admission.  Patient will be treated with community-acquired antibiotics and Kayexalate for the potassium.  [EF]      ED Course User  Index  [EF] Tayo Guadalupe MD     Clinical Impression:   There were no encounter diagnoses.                           Tayo Guadalupe MD  09/11/17 5565

## 2017-09-12 PROBLEM — R13.10 DYSPHAGIA: Status: ACTIVE | Noted: 2017-09-12

## 2017-09-12 LAB
ALBUMIN SERPL BCP-MCNC: 3.3 G/DL
ALP SERPL-CCNC: 58 U/L
ALT SERPL W/O P-5'-P-CCNC: 17 U/L
ANION GAP SERPL CALC-SCNC: 9 MMOL/L
AST SERPL-CCNC: 27 U/L
BASOPHILS # BLD AUTO: 0 K/UL
BASOPHILS NFR BLD: 0.6 %
BILIRUB SERPL-MCNC: 0.2 MG/DL
BUN SERPL-MCNC: 34 MG/DL
CALCIUM SERPL-MCNC: 9 MG/DL
CHLORIDE SERPL-SCNC: 110 MMOL/L
CO2 SERPL-SCNC: 22 MMOL/L
CREAT SERPL-MCNC: 1.7 MG/DL
DIFFERENTIAL METHOD: ABNORMAL
EOSINOPHIL # BLD AUTO: 0.4 K/UL
EOSINOPHIL NFR BLD: 6.7 %
ERYTHROCYTE [DISTWIDTH] IN BLOOD BY AUTOMATED COUNT: 14.1 %
EST. GFR  (AFRICAN AMERICAN): 31 ML/MIN/1.73 M^2
EST. GFR  (NON AFRICAN AMERICAN): 27 ML/MIN/1.73 M^2
ESTIMATED AVG GLUCOSE: 131 MG/DL
GLUCOSE SERPL-MCNC: 112 MG/DL
HBA1C MFR BLD HPLC: 6.2 %
HCT VFR BLD AUTO: 31.8 %
HGB BLD-MCNC: 10.7 G/DL
LYMPHOCYTES # BLD AUTO: 2.2 K/UL
LYMPHOCYTES NFR BLD: 35 %
MAGNESIUM SERPL-MCNC: 2.3 MG/DL
MCH RBC QN AUTO: 32.6 PG
MCHC RBC AUTO-ENTMCNC: 33.8 G/DL
MCV RBC AUTO: 97 FL
MONOCYTES # BLD AUTO: 0.5 K/UL
MONOCYTES NFR BLD: 8.5 %
NEUTROPHILS # BLD AUTO: 3.1 K/UL
NEUTROPHILS NFR BLD: 49.2 %
PHOSPHATE SERPL-MCNC: 3.6 MG/DL
PLATELET # BLD AUTO: 168 K/UL
PMV BLD AUTO: 9.3 FL
POCT GLUCOSE: 118 MG/DL (ref 70–110)
POCT GLUCOSE: 139 MG/DL (ref 70–110)
POCT GLUCOSE: 92 MG/DL (ref 70–110)
POTASSIUM SERPL-SCNC: 4.8 MMOL/L
PROT SERPL-MCNC: 6.5 G/DL
RBC # BLD AUTO: 3.29 M/UL
SODIUM SERPL-SCNC: 141 MMOL/L
WBC # BLD AUTO: 6.2 K/UL

## 2017-09-12 PROCEDURE — 12000002 HC ACUTE/MED SURGE SEMI-PRIVATE ROOM

## 2017-09-12 PROCEDURE — 94640 AIRWAY INHALATION TREATMENT: CPT

## 2017-09-12 PROCEDURE — 83735 ASSAY OF MAGNESIUM: CPT

## 2017-09-12 PROCEDURE — 85025 COMPLETE CBC W/AUTO DIFF WBC: CPT

## 2017-09-12 PROCEDURE — G8996 SWALLOW CURRENT STATUS: HCPCS | Mod: CK

## 2017-09-12 PROCEDURE — G8998 SWALLOW D/C STATUS: HCPCS | Mod: CK

## 2017-09-12 PROCEDURE — 25000003 PHARM REV CODE 250: Performed by: NURSE PRACTITIONER

## 2017-09-12 PROCEDURE — 80053 COMPREHEN METABOLIC PANEL: CPT

## 2017-09-12 PROCEDURE — G8997 SWALLOW GOAL STATUS: HCPCS | Mod: CK

## 2017-09-12 PROCEDURE — 63600175 PHARM REV CODE 636 W HCPCS: Performed by: NURSE PRACTITIONER

## 2017-09-12 PROCEDURE — 25000003 PHARM REV CODE 250: Performed by: EMERGENCY MEDICINE

## 2017-09-12 PROCEDURE — 92610 EVALUATE SWALLOWING FUNCTION: CPT

## 2017-09-12 PROCEDURE — 25000242 PHARM REV CODE 250 ALT 637 W/ HCPCS: Performed by: NURSE PRACTITIONER

## 2017-09-12 PROCEDURE — 25000003 PHARM REV CODE 250: Performed by: HOSPITALIST

## 2017-09-12 PROCEDURE — S0030 INJECTION, METRONIDAZOLE: HCPCS | Performed by: NURSE PRACTITIONER

## 2017-09-12 PROCEDURE — 99222 1ST HOSP IP/OBS MODERATE 55: CPT | Mod: ,,, | Performed by: INTERNAL MEDICINE

## 2017-09-12 PROCEDURE — 84100 ASSAY OF PHOSPHORUS: CPT

## 2017-09-12 PROCEDURE — 36415 COLL VENOUS BLD VENIPUNCTURE: CPT

## 2017-09-12 RX ORDER — CLONIDINE HYDROCHLORIDE 0.1 MG/1
0.1 TABLET ORAL EVERY 6 HOURS PRN
Status: DISCONTINUED | OUTPATIENT
Start: 2017-09-12 | End: 2017-09-14 | Stop reason: HOSPADM

## 2017-09-12 RX ORDER — AMLODIPINE BESYLATE 5 MG/1
10 TABLET ORAL DAILY
Status: DISCONTINUED | OUTPATIENT
Start: 2017-09-12 | End: 2017-09-14 | Stop reason: HOSPADM

## 2017-09-12 RX ADMIN — METRONIDAZOLE 500 MG: 500 INJECTION, SOLUTION INTRAVENOUS at 05:09

## 2017-09-12 RX ADMIN — GABAPENTIN 300 MG: 300 CAPSULE ORAL at 09:09

## 2017-09-12 RX ADMIN — AMLODIPINE BESYLATE 10 MG: 5 TABLET ORAL at 05:09

## 2017-09-12 RX ADMIN — FLUTICASONE PROPIONATE 1 SPRAY: 50 SPRAY, METERED NASAL at 09:09

## 2017-09-12 RX ADMIN — CALCIUM 500 MG: 500 TABLET ORAL at 09:09

## 2017-09-12 RX ADMIN — STANDARDIZED SENNA CONCENTRATE AND DOCUSATE SODIUM 1 TABLET: 8.6; 5 TABLET, FILM COATED ORAL at 09:09

## 2017-09-12 RX ADMIN — CLONIDINE HYDROCHLORIDE 0.1 MG: 0.1 TABLET ORAL at 10:09

## 2017-09-12 RX ADMIN — AZITHROMYCIN MONOHYDRATE 500 MG: 500 INJECTION, POWDER, LYOPHILIZED, FOR SOLUTION INTRAVENOUS at 06:09

## 2017-09-12 RX ADMIN — BISACODYL 10 MG: 10 SUPPOSITORY RECTAL at 01:09

## 2017-09-12 RX ADMIN — ENOXAPARIN SODIUM 30 MG: 100 INJECTION SUBCUTANEOUS at 05:09

## 2017-09-12 RX ADMIN — LOSARTAN POTASSIUM 100 MG: 25 TABLET, FILM COATED ORAL at 09:09

## 2017-09-12 RX ADMIN — IPRATROPIUM BROMIDE AND ALBUTEROL SULFATE 3 ML: .5; 3 SOLUTION RESPIRATORY (INHALATION) at 08:09

## 2017-09-12 RX ADMIN — METRONIDAZOLE 500 MG: 500 INJECTION, SOLUTION INTRAVENOUS at 01:09

## 2017-09-12 RX ADMIN — IPRATROPIUM BROMIDE AND ALBUTEROL SULFATE 3 ML: .5; 3 SOLUTION RESPIRATORY (INHALATION) at 07:09

## 2017-09-12 RX ADMIN — Medication 1 CAPSULE: at 09:09

## 2017-09-12 RX ADMIN — FERROUS SULFATE TAB EC 325 MG (65 MG FE EQUIVALENT) 325 MG: 325 (65 FE) TABLET DELAYED RESPONSE at 09:09

## 2017-09-12 RX ADMIN — METRONIDAZOLE 500 MG: 500 INJECTION, SOLUTION INTRAVENOUS at 09:09

## 2017-09-12 RX ADMIN — SIMVASTATIN 40 MG: 40 TABLET, FILM COATED ORAL at 09:09

## 2017-09-12 RX ADMIN — ASPIRIN 81 MG: 81 TABLET, COATED ORAL at 09:09

## 2017-09-12 RX ADMIN — IPRATROPIUM BROMIDE AND ALBUTEROL SULFATE 3 ML: .5; 3 SOLUTION RESPIRATORY (INHALATION) at 02:09

## 2017-09-12 RX ADMIN — LEVOTHYROXINE SODIUM 100 MCG: 100 TABLET ORAL at 09:09

## 2017-09-12 RX ADMIN — OXYCODONE HYDROCHLORIDE AND ACETAMINOPHEN 500 MG: 500 TABLET ORAL at 09:09

## 2017-09-12 RX ADMIN — CEFTRIAXONE 1 G: 1 INJECTION, SOLUTION INTRAVENOUS at 06:09

## 2017-09-12 NOTE — PT/OT/SLP EVAL
"Speech Language Pathology  Evaluation    Blaire Cage   MRN: [id number]   Admitting Diagnosis: Pneumonia of right lower lobe due to infectious organism    Diet recommendations: Solid Diet Level:  (Dysphagia Diet 2)  Liquid Diet Level: Thin HOB to 90 degrees, Small bites/sips, Alternating bites/sips, Remain upright 30 minutes post meal and Meds whole 1 at a time    SLP Treatment Date: 09/12/17  Speech Start Time: 1358     Speech Stop Time: 1412     Speech Total (min): 14 min       TREATMENT BILLABLE MINUTES:  Eval Swallow and Oral Function 14 and Total Time 14    Diagnosis: Pneumonia of right lower lobe due to infectious organism  Dysphagia    Past Medical History:   Diagnosis Date    Anticoagulant long-term use     aspirin    COPD (chronic obstructive pulmonary disease)     COPD with acute exacerbation 1/9/2015    Diabetes mellitus type II     Encounter for blood transfusion     Hip arthritis 3/1/2016    Hyperlipidemia     Hypertension     Thyroid disease     hypothyroid     Past Surgical History:   Procedure Laterality Date    APPENDECTOMY      FRACTURE SURGERY      right hip     HERNIA REPAIR      groin    HYSTERECTOMY      VARICOSE VEIN SURGERY         Has the patient been evaluated by SLP for swallowing? : Yes  Keep patient NPO?: No   General Precautions: Standard, aspiration          Social Hx: Patient lives alone.    Prior diet: Finely chopped foods, "soft, chopped foods.".    Subjective:  Pt AAOx4. Pleasant and cooperative. C/O solid food dysphagia x4-5 months. States "Cyndi learned to change what I eat. My throat get full then I get nauseated." "Hangs here sometimes (points to lower sternum)." "its like a lump, fullness"      Pain/Comfort  Pain Rating 1: 0/10    Objective: Pt seen for clinical swallowing evaluation. Seen by GI this AM with rec for EGD as outpatient once PNA clears       Oral Musculature Evaluation  Oral Musculature: WFL  Dentition:  (upper partial, anterior lower " dentition)  Mucosal Quality: good  Mandibular Strength and Mobility: WFL  Oral Labial Strength and Mobility: WFL  Lingual Strength and Mobility: WFL  Velar Elevation: WFL  Buccal Strength and Mobility: WFL  Volitional Swallow: timely with adequate laryngeal rise upon palpation  Voice Prior to PO Intake: clear     Bedside Swallow Eval:  Consistencies Assessed: Thin liquids via tsp, cup, straw, 3 oz challenge, Puree via tsp, Soft solids via tsp and Solids 1/2 cookie  Oral Phase: WFL  Pharyngeal Phase: globus sensation, no overt clinical  signs/symptoms of aspiration and no overt clinical signs/symptoms of pharyngeal dysphagia    Assessment:  Blaire Cage is a 87 y.o. female with a medical diagnosis of Pneumonia of right lower lobe due to infectious organism.  Clinical swallowing evaluation completed. All PO trials tolerated with functional oral phase and no overt s/s pharyngeal dysphagia. However, pt with symptoms consistent with esophageal dysphagia, e.g. globus sensation, fullness, etc. REC Dysphagia Diet Level 2, thin liquids, alternate bites/sips, gravy on all meats, remain upright at least 30 minutes after meals. MD may consider MBSS to r/o aspiration. Precautions posted at [facility]    Plan:   Patient to be seen     Plan of Care expires:    Plan of Care reviewed with: patient  SLP Follow-up?: Yes  SLP - Next Visit Date: 09/13/17      SLP G-Codes  Functional Assessment Tool Used: NOMS  Functional Limitations: Swallowing  Swallow Current Status (): CK  Swallow Goal Status (): CK  Swallow Discharge Status (): DANIEL Martinez CCC-SLP  09/12/2017

## 2017-09-12 NOTE — PLAN OF CARE
Problem: Patient Care Overview  Goal: Plan of Care Review  Outcome: Ongoing (interventions implemented as appropriate)  Duonebs Q6 hrs W/A tolerates well.

## 2017-09-12 NOTE — PLAN OF CARE
09/11/17 2055   Patient Assessment/Suction   Level of Consciousness (AVPU) alert   Respiratory Effort Normal;Unlabored   Expansion/Accessory Muscles/Retractions no use of accessory muscles   PRE-TX-O2-ETCO2   O2 Device (Oxygen Therapy) room air   SpO2 96 %   Pulse (!) 59   Pt tolerates RA well.

## 2017-09-12 NOTE — ED NOTES
"Presents to the ER with c/o bilateral lower extremity tingling that started 2 days ago. Associated complaints are chest discomfort that resolved since arriving to the ED. Patient states, " I just feel bad." Patient denies any nausea, vomiting or diarrhea. Patient ambulates without difficulty and lives on her own. + bilateral pedal pulses. Denies any difficulty with ambulation.   Mucous membranes are pink and moist. Skin is warm, dry and intact. Lungs are clear bilaterally, respirations are regular and unlabored. Denies cough, congestion, rhinorrhea or SOB. BS active x4, no tenderness with palpation, abd is soft and not distended. Denies any appetite or activity change. S1S2, capillary refill is < 2 seconds. Denies dysuria, difficulty urinating, frequency, numbness, tingling or weakness. KELLIE LAYTONS    "

## 2017-09-12 NOTE — PLAN OF CARE
Cm met with patient at bedside.  Pt arrived form home, she is self care and drives self to appointments and activities.  Pt lives alone.  PCP I Dr. Edmonds.  Pt is a diabetic, denies dialysis and coumadin.  Disposition:  No needs identified at this time.       09/12/17 1250   Discharge Assessment   Assessment Type Discharge Planning Assessment   Confirmed/corrected address and phone number on facesheet? Yes   Assessment information obtained from? Patient   Prior to hospitilization cognitive status: Alert/Oriented   Prior to hospitalization functional status: Independent   Current cognitive status: Alert/Oriented   Current Functional Status: Independent   Facility Arrived From: home   Lives With alone   Able to Return to Prior Arrangements yes   Is patient able to care for self after discharge? Yes   Who are your caregiver(s) and their phone number(s)? Annabella Guerra - [phone] and Toya Emeka - [phone]   Patient's perception of discharge disposition home or selfcare   Readmission Within The Last 30 Days no previous admission in last 30 days   Patient currently being followed by outpatient case management? Yes   If yes, name of outpatient case management following: insurance company assigned oupatient case management   Equipment Currently Used at Home glucometer;cane, straight   Do you have any problems affording any of your prescribed medications? No   Is the patient taking medications as prescribed? yes   Does the patient have transportation home? Yes   Transportation Available car;family or friend will provide   Does the patient receive services at the [facility]? No   Discharge Plan A Home   Discharge Plan B Home   Patient/Family In Agreement With Plan yes

## 2017-09-12 NOTE — CONSULTS
[facility]     CC: Dysphagia    HPI 87 y.o. female with dysphagia, remote onset, associated with difficulty with solid/coarse foods, with no associated bleeding or weight loss, and with no alleviating/exacerbating factors.  She states that this has been going on for quite some time.  She has had unremarkable EGDs with Dr. Peoples in the remote past with no significant findings.  She has had colonoscopy in 2010 which was unremarkable with no further colonoscopy recommended secondary to age.  She does have a history of smoking.  She is currently admitted with a RLL pneumonia which is worse on most recent imaging.  She also has complaint of some chronic constipation.  She had normal esophagram with no filling defect and no evidence of aspiration in June.    Past Medical History:   Diagnosis Date    Anticoagulant long-term use     aspirin    COPD (chronic obstructive pulmonary disease)     COPD with acute exacerbation 1/9/2015    Diabetes mellitus type II     Encounter for blood transfusion     Hip arthritis 3/1/2016    Hyperlipidemia     Hypertension     Thyroid disease     hypothyroid       Past Surgical History:   Procedure Laterality Date    APPENDECTOMY      FRACTURE SURGERY      right hip     HERNIA REPAIR      groin    HYSTERECTOMY      VARICOSE VEIN SURGERY         Social History   Substance Use Topics    Smoking status: Former Smoker     Years: 44.00     Types: Cigarettes     Quit date: 4/30/2015    Smokeless tobacco: Never Used      Comment: 1/08/2015    Alcohol use No       Family History   Problem Relation Age of Onset    Hypertension Mother     Diabetes Sister     Diabetes Brother     Kidney disease Son        Review of Systems  General ROS: negative for - chills, fever or weight loss  Psychological ROS: negative for - hallucination, depression or suicidal ideation  Ophthalmic ROS: negative for - blurry vision, photophobia or eye pain  ENT ROS: negative for - epistaxis, sore  "throat or rhinorrhea  Respiratory ROS: + cough, shortness of breath, no wheezing  Cardiovascular ROS: no chest pain or dyspnea on exertion  Gastrointestinal ROS: + dysphagia, constipation  Genito-Urinary ROS: no dysuria, trouble voiding, or hematuria  Musculoskeletal ROS: negative for - arthralgia, myalgia, weakness  Neurological ROS: no syncope or seizures; no ataxia  Dermatological ROS: negative for pruritis, rash and jaundice    Physical Examination  BP (!) 161/68 (BP Location: Right arm, Patient Position: Lying)   Pulse (!) 56   Temp 96.8 °F (36 °C) (Axillary)   Resp 18   Ht 5' 3" (1.6 m)   Wt 61.2 kg (135 lb)   LMP  (LMP Unknown)   SpO2 99%   Breastfeeding? No   BMI 23.91 kg/m²   General appearance: alert, cooperative, no distress, frail/thin female  HENT: Normocephalic, atraumatic, neck symmetrical, no nasal discharge   Eyes: conjunctivae/corneas clear, PERRL, EOM's intact, sclera anicteric  Lungs: coarse to auscultation bilaterally, no dullness to percussion bilaterally, symmetric expansion, breathing unlabored  Heart: regular rate and rhythm without rub; no displacement of the PMI   Abdomen: Soft, NT + BS  Extremities: extremities symmetric; no clubbing, cyanosis, or edema  Integument: Skin color, texture, turgor normal; no rashes; hair distrubution normal, no jaundice  Neurologic: Alert and oriented X 3, no focal sensory or motor neurologic deficits  Psychiatric: no pressured speech; normal affect; no evidence of impaired cognition, no anxiety/depression     Labs:  Lab Results   Component Value Date    WBC 6.20 09/12/2017    HGB 10.7 (L) 09/12/2017    HCT 31.8 (L) 09/12/2017    MCV 97 09/12/2017     09/12/2017     CMP  Sodium   Date Value Ref Range Status   09/12/2017 141 136 - 145 mmol/L Final     Potassium   Date Value Ref Range Status   09/12/2017 4.8 3.5 - 5.1 mmol/L Final     Chloride   Date Value Ref Range Status   09/12/2017 110 95 - 110 mmol/L Final     CO2   Date Value Ref Range " Status   09/12/2017 22 (L) 23 - 29 mmol/L Final     Glucose   Date Value Ref Range Status   09/12/2017 112 (H) 70 - 110 mg/dL Final     BUN, Bld   Date Value Ref Range Status   09/12/2017 34 (H) 8 - 23 mg/dL Final     Creatinine   Date Value Ref Range Status   09/12/2017 1.7 (H) 0.5 - 1.4 mg/dL Final   03/04/2013 1.1 0.5 - 1.4 mg/dL Final     Calcium   Date Value Ref Range Status   09/12/2017 9.0 8.7 - 10.5 mg/dL Final   03/04/2013 9.8 8.7 - 10.5 mg/dL Final     Total Protein   Date Value Ref Range Status   09/12/2017 6.5 6.0 - 8.4 g/dL Final     Albumin   Date Value Ref Range Status   09/12/2017 3.3 (L) 3.5 - 5.2 g/dL Final     Total Bilirubin   Date Value Ref Range Status   09/12/2017 0.2 0.1 - 1.0 mg/dL Final     Comment:     For infants and newborns, interpretation of results should be based  on gestational age, weight and in agreement with clinical  observations.  Premature Infant recommended reference ranges:  Up to 24 hours.............<8.0 mg/dL  Up to 48 hours............<12.0 mg/dL  3-5 days..................<15.0 mg/dL  6-29 days.................<15.0 mg/dL       Alkaline Phosphatase   Date Value Ref Range Status   09/12/2017 58 55 - 135 U/L Final   03/04/2013 60 55 - 135 U/L Final     AST   Date Value Ref Range Status   09/12/2017 27 10 - 40 U/L Final   03/04/2013 21 10 - 40 U/L Final     ALT   Date Value Ref Range Status   09/12/2017 17 10 - 44 U/L Final     Anion Gap   Date Value Ref Range Status   09/12/2017 9 8 - 16 mmol/L Final   03/04/2013 10 5 - 15 meq/L Final     eGFR if    Date Value Ref Range Status   09/12/2017 31 (A) >60 mL/min/1.73 m^2 Final     eGFR if non    Date Value Ref Range Status   09/12/2017 27 (A) >60 mL/min/1.73 m^2 Final     Comment:     Calculation used to obtain the estimated glomerular filtration  rate (eGFR) is the CKD-EPI equation. Since race is unknown   in our information system, the eGFR values for   -American and  Non--American patients are given   for each creatinine result.           Imaging:  CXR was independently visualized and reviewed by me and showed RLL pneumonia (worsening).      Esophagram was independently visualized and reviewed by me and showed normal findings with no filling defect or aspiration noted.      I have personally reviewed these images    Assessment:   1.  CAP  2.  Dysphagia  3.  History of smoking  4.  Multiple medical problems    Plan:  1.  Continue current care for CAP per primary team  2.  Diet as tolerated  3.  No urgent endoscopy indicated.  Recommend outpatient follow up with GI to plan for EGD once pneumonia resolved.  This was discussed with the patient as it pertains to the need to rule out malignancy given her smoking history.  Ideally, her pneumonia should be resolved before she is sedated for elective procedure.    4.  Miralax BID for constipation  5.  Follow up as outpatient  6.  Communication will be sent to the referring MD, Dr. Anguiano regarding my assessment and plan on this patient via [employer].  GI to sign off.  Call for questions.      Fadi Jackson MD  [facility]  [address], [address]  [place] [address]  Office: [phone]  Fax: [phone]

## 2017-09-12 NOTE — PLAN OF CARE
Problem: Patient Care Overview  Goal: Plan of Care Review  Outcome: Ongoing (interventions implemented as appropriate)  Pt has remained injury free this shift with fall precautions in progress, accuchecks done with every meal and have been wnl,  She has been educated to deep breath, and call for changes noted in respirations, she has ambulated to restroom has had a large bm this shift, is voiding well, ambulating well, denies pain or discomfort, she was encouraged to call for assistance or needs and verbalized understanding with return demonstration on use of call light.

## 2017-09-12 NOTE — ED NOTES
Upon transport to room 308, patient does not have any cardiac or respiratory complications. No needs or questions at this time.

## 2017-09-12 NOTE — ASSESSMENT & PLAN NOTE
Chronic, controlled without medications.  Monitor blood glucose and treat hyperglycemia as required for hyperglycemia.

## 2017-09-12 NOTE — H&P
"[facility] Medicine  History & Physical    Patient Name: Blaire Cage  MRN: [id number]  Admission Date: 9/11/2017  Attending Physician: Slade Anguiano MD   Primary Care Provider: Eli Edmonds MD         Patient information was obtained from patient and ER records.     Subjective:     Principal Problem:Pneumonia of right lower lobe due to infectious organism    Chief Complaint:   Chief Complaint   Patient presents with    Extremity Weakness     tingling in both feet / not feeling good x 2 days         HPI: Blaire Cage is an 87 y.o. female with PMHx significant for COPD, DM2, hypothyroidism, and HTN.  She was admitted to the service of hospital medicine with CAP.  She presented to the ED with the gradual onset of fatigue that began x 1 week ago associated with worsening BLE paresthesias, SOB, and "feeling like something is in my throat and chest". She reports she is more fatigued with activity.  She reports an occasional cough, as well as dysphagia.  She states she tries to modify her diet to soft foods only, otherwise she feels like things get stuck.  She reports a "weird sensation" when eating. She denies associated nausea, emesis, but states that she "feels bad". No fever or chills.  Other pertinent medical history as below:    Past Medical History:   Diagnosis Date    Anticoagulant long-term use     aspirin    COPD (chronic obstructive pulmonary disease)     COPD with acute exacerbation 1/9/2015    Diabetes mellitus type II     Encounter for blood transfusion     Hip arthritis 3/1/2016    Hyperlipidemia     Hypertension     Thyroid disease     hypothyroid       Past Surgical History:   Procedure Laterality Date    APPENDECTOMY      FRACTURE SURGERY      right hip     HERNIA REPAIR      groin    HYSTERECTOMY      VARICOSE VEIN SURGERY         Review of patient's allergies indicates:   Allergen Reactions    Carvedilol Other (See Comments)     Bradycardia and " syncope    Boniva [ibandronate]     Codeine     Hydralazine analogues     Iodinated contrast- oral and iv dye Hives    Lisinopril     Morphine        No current facility-administered medications on file prior to encounter.      Current Outpatient Prescriptions on File Prior to Encounter   Medication Sig    albuterol-ipratropium 2.5mg-0.5mg/3mL (DUO-NEB) 0.5 mg-3 mg(2.5 mg base)/3 mL nebulizer solution Take 3 mLs by nebulization every 6 (six) hours while awake. Rescue    amlodipine (NORVASC) 10 MG tablet Take 1 tablet (10 mg total) by mouth once daily.    ascorbic acid (VITAMIN C) 500 MG tablet Take 500 mg by mouth once daily.      aspirin (ECOTRIN) 81 MG EC tablet Take 81 mg by mouth once daily.      calcium carbonate (OS-TASIA) 500 mg calcium (1,250 mg) tablet Take 1 tablet by mouth 2 (two) times daily.      cloNIDine (CATAPRES) 0.1 MG tablet Take 1 tablet (0.1 mg total) by mouth 2 (two) times daily. Take one tablet by mouth every hour as needed for systolic blood pressure greater than 180.  Do not take more than 3 tablets in 24 hours.    ferrous sulfate 325 mg (65 mg iron) Tab tablet Take 1 tablet (325 mg total) by mouth daily with breakfast.    fish oil-omega-3 fatty acids 300-1,000 mg capsule Take 2 g by mouth every evening.     fluticasone (FLONASE) 50 mcg/actuation nasal spray 1 spray by Each Nare route once daily. (Patient taking differently: 1 spray by Each Nare route daily as needed for Rhinitis or Allergies. )    gabapentin (NEURONTIN) 300 MG capsule Take 1 capsule (300 mg total) by mouth every evening.    guanfacine (TENEX) 1 MG Tab Take 1 mg by mouth every evening. Take 1 tablet by mouth in the evening    levothyroxine (SYNTHROID) 75 MCG tablet Take 1 tablet (75 mcg total) by mouth once daily.    losartan (COZAAR) 100 MG tablet Take 1 tablet (100 mg total) by mouth once daily.    magnesium oxide (MAG-OX) 400 mg tablet take by mouth once daily    nitroGLYCERIN (NITROSTAT) 0.4 MG SL  tablet Place 1 tablet (0.4 mg total) under the tongue every 5 (five) minutes as needed for Chest pain. As needed    simvastatin (ZOCOR) 40 MG tablet TAKE ONE TABLET BY MOUTH ONCE DAILY IN THE EVENING    vitamin D 1000 units Tab Take 1 tablet (1,000 Units total) by mouth once daily.    [DISCONTINUED] blood sugar diagnostic Strp 1 strip by Misc.(Non-Drug; Combo Route) route 2 (two) times daily.    [DISCONTINUED] citalopram (CELEXA) 10 MG tablet Take 1 tablet (10 mg total) by mouth once daily.    [DISCONTINUED] CLEVER CHEK BLOOD GLUCOSE SYST kit     [DISCONTINUED] lancets (ULTRA THIN LANCETS) 28 gauge Misc 1 lancet by Subdermal route 2 (two) times daily.    [DISCONTINUED] pantoprazole (PROTONIX) 20 MG tablet Take 1 tablet (20 mg total) by mouth once daily.     Family History     Problem Relation (Age of Onset)    Diabetes Sister, Brother    Hypertension Mother    Kidney disease Son        Social History Main Topics    Smoking status: Former Smoker     Years: 44.00     Types: Cigarettes     Quit date: 4/30/2015    Smokeless tobacco: Never Used      Comment: 1/08/2015    Alcohol use No    Drug use: No    Sexual activity: No     Review of Systems   Constitutional: Positive for activity change and fatigue. Negative for appetite change, chills and fever.   HENT: Positive for trouble swallowing. Negative for congestion, postnasal drip, sinus pressure and sore throat.    Eyes: Negative for photophobia and visual disturbance.   Respiratory: Positive for cough and shortness of breath. Negative for chest tightness and wheezing.    Cardiovascular: Negative for chest pain, palpitations and leg swelling.   Gastrointestinal: Positive for constipation and nausea. Negative for abdominal distention, abdominal pain, diarrhea and vomiting.   Genitourinary: Negative for difficulty urinating, dysuria, flank pain and hematuria.   Musculoskeletal: Negative for arthralgias and back pain.   Skin: Negative for color change.    Neurological: Negative for dizziness, weakness, light-headedness and headaches.   Psychiatric/Behavioral: Negative for confusion. The patient is not nervous/anxious.      Objective:     Vital Signs (Most Recent):  Temp: 99.3 °F (37.4 °C) (09/11/17 2306)  Pulse: 62 (09/11/17 2306)  Resp: 17 (09/11/17 2306)  BP: (!) 163/71 (09/11/17 2306)  SpO2: 96 % (09/11/17 2306) Vital Signs (24h Range):  Temp:  [98 °F (36.7 °C)-99.3 °F (37.4 °C)] 99.3 °F (37.4 °C)  Pulse:  [56-62] 62  Resp:  [17-18] 17  SpO2:  [96 %-100 %] 96 %  BP: (152-181)/(69-93) 163/71     Weight: 61.2 kg (135 lb)  Body mass index is 23.91 kg/m².    Physical Exam   Constitutional: She is oriented to person, place, and time. She appears well-developed.   Frail appearing elderly female in no distress.   HENT:   Head: Normocephalic and atraumatic.   Eyes: Conjunctivae and EOM are normal. Pupils are equal, round, and reactive to light.   Neck: Normal range of motion. Neck supple. No thyromegaly present.   Cardiovascular: Normal rate, regular rhythm, normal heart sounds and intact distal pulses.    Pulmonary/Chest: Effort normal. No respiratory distress. She has wheezes. She has no rales. She exhibits no tenderness.   Abdominal: Soft. Bowel sounds are normal. She exhibits no distension. There is no tenderness.   Musculoskeletal: Normal range of motion. She exhibits no edema or tenderness.   Neurological: She is alert and oriented to person, place, and time. No cranial nerve deficit.   Skin: Skin is warm and dry. Capillary refill takes less than 2 seconds. No erythema.   Psychiatric: She has a normal mood and affect. Her behavior is normal. Judgment and thought content normal.        Significant Labs:   CBC:   Recent Labs  Lab 09/11/17  1518   WBC 6.70   HGB 11.2*   HCT 32.7*        CMP:   Recent Labs  Lab 09/11/17  1518   *   K 5.7*      CO2 25   GLU 72   BUN 39*   CREATININE 1.7*   CALCIUM 9.4   PROT 7.0   ALBUMIN 3.5   BILITOT 0.4   ALKPHOS  64   AST 26   ALT 16   ANIONGAP 7*   EGFRNONAA 27*     Coagulation:   Recent Labs  Lab 09/11/17  1518   INR 1.0     Troponin:   Recent Labs  Lab 09/11/17  1518   TROPONINI <0.006     Urine Studies:   Recent Labs  Lab 09/11/17  1538   COLORU Yellow   APPEARANCEUA Clear   PHUR 6.0   SPECGRAV <=1.005*   PROTEINUA 1+*   GLUCUA Negative   KETONESU Negative   BILIRUBINUA Negative   OCCULTUA Negative   NITRITE Negative   UROBILINOGEN Negative   LEUKOCYTESUR Negative   RBCUA 0   WBCUA 1   BACTERIA Occasional   SQUAMEPITHEL 1   HYALINECASTS 0       Significant Imaging:     CXR: Right basilar infiltrate appearing mildly increased compared to the prior exam.       Assessment/Plan:     * Pneumonia of right lower lobe due to infectious organism    Azithromycin/ceftriaxone.  Will add flagyl to cover for potential anaerobic pathogens-- concern for aspiration.  DuoNebs, supplemental O2. SLP to eval in AM prior to resumption of diet.          Dysphagia    NPO.  Consult GI to evaluate-- consider EGD.  SLP eval prior to eating.  Concern for aspiration.           COPD (chronic obstructive pulmonary disease)    Now with PNA.  DuoNeb therapy. Supplemental O2. Abx for PNA.          Type 2 diabetes mellitus with complication    Chronic, controlled without medications.  Monitor blood glucose and treat hyperglycemia as required for hyperglycemia.          Hyperlipidemia associated with type 2 diabetes mellitus    Chronic, continue statin therapy.          Hypertension associated with diabetes    Chronic, controlled on current medication.  Monitor BP closely, titrate medication as required for sustained BP control.          Hypothyroid    TSH 16 with low T4-- likely contributing to fatigue.  Increase synthroid to 100 mcg daily-- follow-up TSH outpatient.          CKD (chronic kidney disease), stage III, eGFR 39, progressive 31    Creatinine stable with baseline.  Follow renal panel closely.  Avoid non-essential nephrotoxins.  Renal dose  medications as appropriate. Hyperkalemia noted-- hydrating; follow and tx as require.            VTE Risk Mitigation         Ordered     enoxaparin injection 30 mg  Daily     Route:  Subcutaneous        09/11/17 2355     Medium Risk of VTE  Once      09/11/17 2046          I spent 16 minutes of face to face discussion regarding advance directives and end of life planning which included patient and family. Patient/Family understands the seriousness of their condition and would like to make their end of life decisions known as follows- patient does not wish to be intubated, but is agreeable to CPR, defibrillation, and ACLS medications.         Lenka Hernandez NP  Department of Hospital Medicine   [facility]

## 2017-09-12 NOTE — ASSESSMENT & PLAN NOTE
Azithromycin/ceftriaxone.  Will add flagyl to cover for potential anaerobic pathogens-- concern for aspiration.  DuoNebs, supplemental O2. SLP to eval in AM prior to resumption of diet.

## 2017-09-12 NOTE — ASSESSMENT & PLAN NOTE
Creatinine stable with baseline.  Follow renal panel closely.  Avoid non-essential nephrotoxins.  Renal dose medications as appropriate. Hyperkalemia noted-- hydrating; follow and tx as require.

## 2017-09-12 NOTE — PLAN OF CARE
Problem: Patient Care Overview  Goal: Plan of Care Review  Outcome: Ongoing (interventions implemented as appropriate)  Pt admit from Er, AAO x 3, family at bedside and present during admission assessment.  PIV in place, saline locked.  medications administered as ordered.  BG AC/HS assessed, no SSI coverage needed per provider order.  JIMMY hose applied.  Suppository administered, no BM as of yet.  NP placed pt on NPO status, consult to Gi, consult to speech therapy.  VS stable.  Pt has remained free of injuries and falls throughout shift.  Environment free of clutter, side rails up x2, and call light within reach.  Pt has verbalized understanding of plan of care.

## 2017-09-12 NOTE — PLAN OF CARE
Problem: SLP Goal  Goal: SLP Goal    Clinical swallowing evaluation completed. All PO trials tolerated with functional oral phase and no overt s/s pharyngeal dysphagia. However, pt with symptoms consistent with esophageal dysphagia, e.g. globus sensation, fullness, etc. REC Dysphagia Diet Level 2, thin liquids, alternate bites/sips, gravy on all meats, remain upright at least 30 minutes after meals. MD may consider MBSS to r/o aspiration.     Flaca Martinez MS, CCC/SLP 9/12/2017

## 2017-09-12 NOTE — ASSESSMENT & PLAN NOTE
TSH 16 with low T4-- likely contributing to fatigue.  Increase synthroid to 100 mcg daily-- follow-up TSH outpatient.

## 2017-09-12 NOTE — ASSESSMENT & PLAN NOTE
NPO.  Consult GI to evaluate-- consider EGD.  SLP eval prior to eating.  Concern for aspiration.

## 2017-09-12 NOTE — ASSESSMENT & PLAN NOTE
Chronic, controlled on current medication.  Monitor BP closely, titrate medication as required for sustained BP control.

## 2017-09-12 NOTE — SUBJECTIVE & OBJECTIVE
Past Medical History:   Diagnosis Date    Anticoagulant long-term use     aspirin    COPD (chronic obstructive pulmonary disease)     COPD with acute exacerbation 1/9/2015    Diabetes mellitus type II     Encounter for blood transfusion     Hip arthritis 3/1/2016    Hyperlipidemia     Hypertension     Thyroid disease     hypothyroid       Past Surgical History:   Procedure Laterality Date    APPENDECTOMY      FRACTURE SURGERY      right hip     HERNIA REPAIR      groin    HYSTERECTOMY      VARICOSE VEIN SURGERY         Review of patient's allergies indicates:   Allergen Reactions    Carvedilol Other (See Comments)     Bradycardia and syncope    Boniva [ibandronate]     Codeine     Hydralazine analogues     Iodinated contrast- oral and iv dye Hives    Lisinopril     Morphine        No current facility-administered medications on file prior to encounter.      Current Outpatient Prescriptions on File Prior to Encounter   Medication Sig    albuterol-ipratropium 2.5mg-0.5mg/3mL (DUO-NEB) 0.5 mg-3 mg(2.5 mg base)/3 mL nebulizer solution Take 3 mLs by nebulization every 6 (six) hours while awake. Rescue    amlodipine (NORVASC) 10 MG tablet Take 1 tablet (10 mg total) by mouth once daily.    ascorbic acid (VITAMIN C) 500 MG tablet Take 500 mg by mouth once daily.      aspirin (ECOTRIN) 81 MG EC tablet Take 81 mg by mouth once daily.      calcium carbonate (OS-TASIA) 500 mg calcium (1,250 mg) tablet Take 1 tablet by mouth 2 (two) times daily.      cloNIDine (CATAPRES) 0.1 MG tablet Take 1 tablet (0.1 mg total) by mouth 2 (two) times daily. Take one tablet by mouth every hour as needed for systolic blood pressure greater than 180.  Do not take more than 3 tablets in 24 hours.    ferrous sulfate 325 mg (65 mg iron) Tab tablet Take 1 tablet (325 mg total) by mouth daily with breakfast.    fish oil-omega-3 fatty acids 300-1,000 mg capsule Take 2 g by mouth every evening.     fluticasone (FLONASE) 50  mcg/actuation nasal spray 1 spray by Each Nare route once daily. (Patient taking differently: 1 spray by Each Nare route daily as needed for Rhinitis or Allergies. )    gabapentin (NEURONTIN) 300 MG capsule Take 1 capsule (300 mg total) by mouth every evening.    guanfacine (TENEX) 1 MG Tab Take 1 mg by mouth every evening. Take 1 tablet by mouth in the evening    levothyroxine (SYNTHROID) 75 MCG tablet Take 1 tablet (75 mcg total) by mouth once daily.    losartan (COZAAR) 100 MG tablet Take 1 tablet (100 mg total) by mouth once daily.    magnesium oxide (MAG-OX) 400 mg tablet take by mouth once daily    nitroGLYCERIN (NITROSTAT) 0.4 MG SL tablet Place 1 tablet (0.4 mg total) under the tongue every 5 (five) minutes as needed for Chest pain. As needed    simvastatin (ZOCOR) 40 MG tablet TAKE ONE TABLET BY MOUTH ONCE DAILY IN THE EVENING    vitamin D 1000 units Tab Take 1 tablet (1,000 Units total) by mouth once daily.    [DISCONTINUED] blood sugar diagnostic Strp 1 strip by Misc.(Non-Drug; Combo Route) route 2 (two) times daily.    [DISCONTINUED] citalopram (CELEXA) 10 MG tablet Take 1 tablet (10 mg total) by mouth once daily.    [DISCONTINUED] CLEVER CHEK BLOOD GLUCOSE SYST kit     [DISCONTINUED] lancets (ULTRA THIN LANCETS) 28 [facility] lancet by Subdermal route 2 (two) times daily.    [DISCONTINUED] pantoprazole (PROTONIX) 20 MG tablet Take 1 tablet (20 mg total) by mouth once daily.     Family History     Problem Relation (Age of Onset)    Diabetes Sister, Brother    Hypertension Mother    Kidney disease Son        Social History Main Topics    Smoking status: Former Smoker     Years: 44.00     Types: Cigarettes     Quit date: 4/30/2015    Smokeless tobacco: Never Used      Comment: 1/08/2015    Alcohol use No    Drug use: No    Sexual activity: No     Review of Systems   Constitutional: Positive for activity change and fatigue. Negative for appetite change, chills and fever.   HENT: Positive  for trouble swallowing. Negative for congestion, postnasal drip, sinus pressure and sore throat.    Eyes: Negative for photophobia and visual disturbance.   Respiratory: Positive for cough and shortness of breath. Negative for chest tightness and wheezing.    Cardiovascular: Negative for chest pain, palpitations and leg swelling.   Gastrointestinal: Positive for constipation and nausea. Negative for abdominal distention, abdominal pain, diarrhea and vomiting.   Genitourinary: Negative for difficulty urinating, dysuria, flank pain and hematuria.   Musculoskeletal: Negative for arthralgias and back pain.   Skin: Negative for color change.   Neurological: Negative for dizziness, weakness, light-headedness and headaches.   Psychiatric/Behavioral: Negative for confusion. The patient is not nervous/anxious.      Objective:     Vital Signs (Most Recent):  Temp: 99.3 °F (37.4 °C) (09/11/17 2306)  Pulse: 62 (09/11/17 2306)  Resp: 17 (09/11/17 2306)  BP: (!) 163/71 (09/11/17 2306)  SpO2: 96 % (09/11/17 2306) Vital Signs (24h Range):  Temp:  [98 °F (36.7 °C)-99.3 °F (37.4 °C)] 99.3 °F (37.4 °C)  Pulse:  [56-62] 62  Resp:  [17-18] 17  SpO2:  [96 %-100 %] 96 %  BP: (152-181)/(69-93) 163/71     Weight: 61.2 kg (135 lb)  Body mass index is 23.91 kg/m².    Physical Exam   Constitutional: She is oriented to person, place, and time. She appears well-developed.   Frail appearing elderly female in no distress.   HENT:   Head: Normocephalic and atraumatic.   Eyes: Conjunctivae and EOM are normal. Pupils are equal, round, and reactive to light.   Neck: Normal range of motion. Neck supple. No thyromegaly present.   Cardiovascular: Normal rate, regular rhythm, normal heart sounds and intact distal pulses.    Pulmonary/Chest: Effort normal. No respiratory distress. She has wheezes. She has no rales. She exhibits no tenderness.   Abdominal: Soft. Bowel sounds are normal. She exhibits no distension. There is no tenderness.   Musculoskeletal:  Normal range of motion. She exhibits no edema or tenderness.   Neurological: She is alert and oriented to person, place, and time. No cranial nerve deficit.   Skin: Skin is warm and dry. Capillary refill takes less than 2 seconds. No erythema.   Psychiatric: She has a normal mood and affect. Her behavior is normal. Judgment and thought content normal.        Significant Labs:   CBC:   Recent Labs  Lab 09/11/17  1518   WBC 6.70   HGB 11.2*   HCT 32.7*        CMP:   Recent Labs  Lab 09/11/17  1518   *   K 5.7*      CO2 25   GLU 72   BUN 39*   CREATININE 1.7*   CALCIUM 9.4   PROT 7.0   ALBUMIN 3.5   BILITOT 0.4   ALKPHOS 64   AST 26   ALT 16   ANIONGAP 7*   EGFRNONAA 27*     Coagulation:   Recent Labs  Lab 09/11/17  1518   INR 1.0     Troponin:   Recent Labs  Lab 09/11/17  1518   TROPONINI <0.006     Urine Studies:   Recent Labs  Lab 09/11/17  1538   COLORU Yellow   APPEARANCEUA Clear   PHUR 6.0   SPECGRAV <=1.005*   PROTEINUA 1+*   GLUCUA Negative   KETONESU Negative   BILIRUBINUA Negative   OCCULTUA Negative   NITRITE Negative   UROBILINOGEN Negative   LEUKOCYTESUR Negative   RBCUA 0   WBCUA 1   BACTERIA Occasional   SQUAMEPITHEL 1   HYALINECASTS 0       Significant Imaging:     CXR: Right basilar infiltrate appearing mildly increased compared to the prior exam.

## 2017-09-13 LAB
POCT GLUCOSE: 134 MG/DL (ref 70–110)
POCT GLUCOSE: 151 MG/DL (ref 70–110)
POCT GLUCOSE: 162 MG/DL (ref 70–110)
POCT GLUCOSE: 69 MG/DL (ref 70–110)
POCT GLUCOSE: 99 MG/DL (ref 70–110)

## 2017-09-13 PROCEDURE — 63600175 PHARM REV CODE 636 W HCPCS: Performed by: NURSE PRACTITIONER

## 2017-09-13 PROCEDURE — 25000003 PHARM REV CODE 250: Performed by: NURSE PRACTITIONER

## 2017-09-13 PROCEDURE — S0030 INJECTION, METRONIDAZOLE: HCPCS | Performed by: NURSE PRACTITIONER

## 2017-09-13 PROCEDURE — 25000242 PHARM REV CODE 250 ALT 637 W/ HCPCS: Performed by: NURSE PRACTITIONER

## 2017-09-13 PROCEDURE — 27000221 HC OXYGEN, UP TO 24 HOURS

## 2017-09-13 PROCEDURE — 25000003 PHARM REV CODE 250: Performed by: HOSPITALIST

## 2017-09-13 PROCEDURE — 12000002 HC ACUTE/MED SURGE SEMI-PRIVATE ROOM

## 2017-09-13 PROCEDURE — 94640 AIRWAY INHALATION TREATMENT: CPT

## 2017-09-13 PROCEDURE — 94761 N-INVAS EAR/PLS OXIMETRY MLT: CPT

## 2017-09-13 RX ORDER — LEVOTHYROXINE SODIUM 100 UG/1
100 TABLET ORAL
Status: DISCONTINUED | OUTPATIENT
Start: 2017-09-14 | End: 2017-09-14 | Stop reason: HOSPADM

## 2017-09-13 RX ADMIN — LOSARTAN POTASSIUM 100 MG: 25 TABLET, FILM COATED ORAL at 09:09

## 2017-09-13 RX ADMIN — ENOXAPARIN SODIUM 30 MG: 100 INJECTION SUBCUTANEOUS at 06:09

## 2017-09-13 RX ADMIN — CALCIUM 500 MG: 500 TABLET ORAL at 09:09

## 2017-09-13 RX ADMIN — METRONIDAZOLE 500 MG: 500 INJECTION, SOLUTION INTRAVENOUS at 12:09

## 2017-09-13 RX ADMIN — IPRATROPIUM BROMIDE AND ALBUTEROL SULFATE 3 ML: .5; 3 SOLUTION RESPIRATORY (INHALATION) at 08:09

## 2017-09-13 RX ADMIN — Medication 1 CAPSULE: at 09:09

## 2017-09-13 RX ADMIN — CEFTRIAXONE 1 G: 1 INJECTION, SOLUTION INTRAVENOUS at 06:09

## 2017-09-13 RX ADMIN — METRONIDAZOLE 500 MG: 500 INJECTION, SOLUTION INTRAVENOUS at 05:09

## 2017-09-13 RX ADMIN — METRONIDAZOLE 500 MG: 500 INJECTION, SOLUTION INTRAVENOUS at 09:09

## 2017-09-13 RX ADMIN — ASPIRIN 81 MG: 81 TABLET, COATED ORAL at 09:09

## 2017-09-13 RX ADMIN — OXYCODONE HYDROCHLORIDE AND ACETAMINOPHEN 500 MG: 500 TABLET ORAL at 09:09

## 2017-09-13 RX ADMIN — IPRATROPIUM BROMIDE AND ALBUTEROL SULFATE 3 ML: .5; 3 SOLUTION RESPIRATORY (INHALATION) at 01:09

## 2017-09-13 RX ADMIN — IPRATROPIUM BROMIDE AND ALBUTEROL SULFATE 3 ML: .5; 3 SOLUTION RESPIRATORY (INHALATION) at 07:09

## 2017-09-13 RX ADMIN — AZITHROMYCIN MONOHYDRATE 500 MG: 500 INJECTION, POWDER, LYOPHILIZED, FOR SOLUTION INTRAVENOUS at 07:09

## 2017-09-13 RX ADMIN — AMLODIPINE BESYLATE 10 MG: 5 TABLET ORAL at 06:09

## 2017-09-13 RX ADMIN — FERROUS SULFATE TAB EC 325 MG (65 MG FE EQUIVALENT) 325 MG: 325 (65 FE) TABLET DELAYED RESPONSE at 09:09

## 2017-09-13 RX ADMIN — LEVOTHYROXINE SODIUM 100 MCG: 100 TABLET ORAL at 09:09

## 2017-09-13 RX ADMIN — GABAPENTIN 300 MG: 300 CAPSULE ORAL at 09:09

## 2017-09-13 RX ADMIN — SIMVASTATIN 40 MG: 40 TABLET, FILM COATED ORAL at 09:09

## 2017-09-13 RX ADMIN — FLUTICASONE PROPIONATE 1 SPRAY: 50 SPRAY, METERED NASAL at 09:09

## 2017-09-13 NOTE — PLAN OF CARE
Problem: Patient Care Overview  Goal: Plan of Care Review  Outcome: Ongoing (interventions implemented as appropriate)  Room air sats 90% placed on Nc 2 lpm, aerosol txs Q6 W/A tolerates well.

## 2017-09-13 NOTE — ASSESSMENT & PLAN NOTE
Seen by GI consultant today.  No indication for urgent endoscopic evaluation.  Will need EGD as outpatient once pneumonia is cleared up.

## 2017-09-13 NOTE — ASSESSMENT & PLAN NOTE
TSH 16 with low T4-- likely contributing to fatigue.  Increased synthroid to 100 mcg daily-- follow-up TSH outpatient.

## 2017-09-13 NOTE — SUBJECTIVE & OBJECTIVE
Interval History:  A little more energy today.  Evaluated by SLP and GI.    Review of Systems   Constitutional: Negative for chills, fatigue and fever.   Respiratory: Negative for cough and shortness of breath.    Cardiovascular: Negative for chest pain.     Objective:     Vital Signs (Most Recent):  Temp: 98.8 °F (37.1 °C) (09/12/17 1455)  Pulse: 65 (09/12/17 1455)  Resp: 18 (09/12/17 1455)  BP: (!) 185/74 (09/12/17 1455)  SpO2: (!) 92 % (09/12/17 1455) Vital Signs (24h Range):  Temp:  [96.8 °F (36 °C)-99.3 °F (37.4 °C)] 98.8 °F (37.1 °C)  Pulse:  [56-73] 65  Resp:  [17-18] 18  SpO2:  [92 %-99 %] 92 %  BP: (128-185)/(59-75) 185/74     Weight: 61.2 kg (135 lb)  Body mass index is 23.91 kg/m².    Intake/Output Summary (Last 24 hours) at 09/12/17 1900  Last data filed at 09/12/17 1739   Gross per 24 hour   Intake              540 ml   Output                0 ml   Net              540 ml      Physical Exam   Constitutional: She is oriented to person, place, and time. She appears well-nourished. No distress.   HENT:   Head: Normocephalic and atraumatic.   Eyes: EOM are normal. Right eye exhibits no discharge. Left eye exhibits no discharge.   Neck: Normal range of motion. Neck supple. No JVD present.   Cardiovascular: Normal rate, regular rhythm and intact distal pulses.    Pulmonary/Chest: Effort normal and breath sounds normal. She exhibits no tenderness.   Abdominal: Soft. Bowel sounds are normal. She exhibits no distension. There is no tenderness.   Musculoskeletal: Normal range of motion. She exhibits no edema.   Neurological: She is alert and oriented to person, place, and time. No cranial nerve deficit.   Skin: Skin is warm and dry. No rash noted.   Psychiatric: She has a normal mood and affect. Her behavior is normal.       Significant Labs: All pertinent labs within the past 24 hours have been reviewed.    Significant Imaging: none

## 2017-09-13 NOTE — ASSESSMENT & PLAN NOTE
Creatinine stable with baseline.  Follow renal panel closely.  Avoid non-essential nephrotoxins.  Renal dose medications as appropriate.

## 2017-09-13 NOTE — SUBJECTIVE & OBJECTIVE
Interval History:  Better still.  Anxious to go home.    Review of Systems   Constitutional: Negative for chills, fatigue and fever.   Respiratory: Positive for cough. Negative for shortness of breath.    Cardiovascular: Negative for chest pain.     Objective:     Vital Signs (Most Recent):  Temp: 97.9 °F (36.6 °C) (09/13/17 1531)  Pulse: 71 (09/13/17 1531)  Resp: 20 (09/13/17 1531)  BP: (!) 158/64 (09/13/17 1531)  SpO2: 98 % (09/13/17 1531) Vital Signs (24h Range):  Temp:  [97.2 °F (36.2 °C)-98.4 °F (36.9 °C)] 97.9 °F (36.6 °C)  Pulse:  [53-73] 71  Resp:  [16-20] 20  SpO2:  [91 %-100 %] 98 %  BP: (115-198)/(55-84) 158/64     Weight: 61.2 kg (135 lb)  Body mass index is 23.91 kg/m².  No intake or output data in the 24 hours ending 09/13/17 1740   Physical Exam   Constitutional: She is oriented to person, place, and time. She appears well-nourished. No distress.   HENT:   Head: Normocephalic and atraumatic.   Eyes: EOM are normal. Right eye exhibits no discharge. Left eye exhibits no discharge.   Neck: Normal range of motion. Neck supple. No JVD present.   Cardiovascular: Normal rate, regular rhythm and intact distal pulses.    Pulmonary/Chest: Effort normal and breath sounds normal. She exhibits no tenderness.   Abdominal: Soft. Bowel sounds are normal. She exhibits no distension. There is no tenderness.   Musculoskeletal: Normal range of motion. She exhibits no edema.   Neurological: She is alert and oriented to person, place, and time. No cranial nerve deficit.   Skin: Skin is warm and dry. No rash noted.   Psychiatric: She has a normal mood and affect. Her behavior is normal.       Significant Labs: None    Significant Imaging: none

## 2017-09-13 NOTE — ASSESSMENT & PLAN NOTE
Seen by GI consultant.  No indication for urgent endoscopic evaluation.  Will need EGD as outpatient once pneumonia is cleared up.

## 2017-09-13 NOTE — PROGRESS NOTES
"[facility] Medicine  Progress Note    Patient Name: Blaire Cage  MRN: [id number]  Patient Class: IP- Inpatient   Admission Date: 9/11/2017  Length of Stay: 2 days  Attending Physician: Slade Anguiano MD  Primary Care Provider: Eli Edmonds MD        Subjective:     Principal Problem:Pneumonia of right lower lobe due to infectious organism    HPI:  Blaire Cage is an 87 y.o. female with PMHx significant for COPD, DM2, hypothyroidism, and HTN.  She was admitted to the service of hospital medicine with CAP.  She presented to the ED with the gradual onset of fatigue that began x 1 week ago associated with worsening BLE paresthesias, SOB, and "feeling like something is in my throat and chest". She reports she is more fatigued with activity.  She reports an occasional cough, as well as dysphagia.  She states she tries to modify her diet to soft foods only, otherwise she feels like things get stuck.  She reports a "weird sensation" when eating. She denies associated nausea, emesis, but states that she "feels bad". No fever or chills.  Other pertinent medical history as below:    Hospital Course:  No notes on file    Interval History:  Better still.  Anxious to go home.    Review of Systems   Constitutional: Negative for chills, fatigue and fever.   Respiratory: Positive for cough. Negative for shortness of breath.    Cardiovascular: Negative for chest pain.     Objective:     Vital Signs (Most Recent):  Temp: 97.9 °F (36.6 °C) (09/13/17 1531)  Pulse: 71 (09/13/17 1531)  Resp: 20 (09/13/17 1531)  BP: (!) 158/64 (09/13/17 1531)  SpO2: 98 % (09/13/17 1531) Vital Signs (24h Range):  Temp:  [97.2 °F (36.2 °C)-98.4 °F (36.9 °C)] 97.9 °F (36.6 °C)  Pulse:  [53-73] 71  Resp:  [16-20] 20  SpO2:  [91 %-100 %] 98 %  BP: (115-198)/(55-84) 158/64     Weight: 61.2 kg (135 lb)  Body mass index is 23.91 kg/m².  No intake or output data in the 24 hours ending 09/13/17 0810   Physical Exam "   Constitutional: She is oriented to person, place, and time. She appears well-nourished. No distress.   HENT:   Head: Normocephalic and atraumatic.   Eyes: EOM are normal. Right eye exhibits no discharge. Left eye exhibits no discharge.   Neck: Normal range of motion. Neck supple. No JVD present.   Cardiovascular: Normal rate, regular rhythm and intact distal pulses.    Pulmonary/Chest: Effort normal and breath sounds normal. She exhibits no tenderness.   Abdominal: Soft. Bowel sounds are normal. She exhibits no distension. There is no tenderness.   Musculoskeletal: Normal range of motion. She exhibits no edema.   Neurological: She is alert and oriented to person, place, and time. No cranial nerve deficit.   Skin: Skin is warm and dry. No rash noted.   Psychiatric: She has a normal mood and affect. Her behavior is normal.       Significant Labs: None    Significant Imaging: none    Assessment/Plan:      * Pneumonia of right lower lobe due to infectious organism    Continue Azithromycin/ceftriaxone.  Improving.           Dysphagia    Seen by GI consultant.  No indication for urgent endoscopic evaluation.  Will need EGD as outpatient once pneumonia is cleared up.          COPD (chronic obstructive pulmonary disease)    Now with PNA.  Cont DuoNeb therapy. Supplemental O2. Abx for PNA.  No need for steroid.          Type 2 diabetes mellitus with complication    Chronic, controlled without medications.  Monitor blood glucose and treat hyperglycemia as required for hyperglycemia.          Hyperlipidemia associated with type 2 diabetes mellitus    Chronic, continue statin therapy.          Hypertension associated with diabetes    Chronic, controlled on current medication.  Monitor BP closely, titrate medication as required for sustained BP control.          Hypothyroid    TSH 16 with low T4-- likely contributing to fatigue.  Increased synthroid to 100 mcg daily-- follow-up TSH outpatient.          CKD (chronic kidney  disease), stage III, eGFR 39, progressive 31    Creatinine stable with baseline.  Follow renal panel closely.  Avoid non-essential nephrotoxins.  Renal dose medications as appropriate.             VTE Risk Mitigation         Ordered     enoxaparin injection 30 mg  Daily     Route:  Subcutaneous        09/11/17 9062     Medium Risk of VTE  Once      09/11/17 2046              Slade Anguiano MD  Department of Hospital Medicine   [facility]

## 2017-09-13 NOTE — PT/OT/SLP PROGRESS
Speech Language Pathology  Treatment    Blaire Cage   MRN: [id number]   Admitting Diagnosis: Pneumonia of right lower lobe due to infectious organism    Diet recommendations: Solid Diet Level:  (Dysphagia Diet 2)  Liquid Diet Level: Thin HOB to 90 degrees, Small bites/sips, Alternating bites/sips, Remain upright 30 minutes post meal and Meds whole 1 at a time    SLP Treatment Date: 09/13/17  Speech Start Time: 1156     Speech Stop Time: 1205     Speech Total (min): 9 min       TREATMENT BILLABLE MINUTES:  Treatment Swallowing Dysfunction 9    Has the patient been evaluated by SLP for swallowing? : Yes  Keep patient NPO?: No   General Precautions: Standard, aspiration          Subjective:  I forgot the doctor's name I'm supposed to see after I go home.       Objective:     Pt seen for f/u & education re Swallowing Guidelines.  Alert & cooperative, no dysarthria.  Reviewed written information with return demo, with emphasis on remaining up after meals due to risk of reflux causing aspiration.      Assessment:  Blaire Cage is a 87 y.o. female with a medical diagnosis of Pneumonia of right lower lobe due to infectious organism and presents with s/s esophageal dysphagia.  She was seen by Dr. Flores & is to see him for EGD after d/c.     Discharge recommendations:       Goals:    SLP Goals        Problem: SLP Goal    Goal Priority Disciplines Outcome   SLP Goal     SLP                     Plan:   Plan of Care reviewed with: patient  SLP Follow-up?: No  SLP - Next Visit Date: 09/13/17           Awa Zheng CCC-SLP/A  09/13/2017

## 2017-09-13 NOTE — PLAN OF CARE
Problem: Patient Care Overview  Goal: Plan of Care Review  Pt AAO x3, PIV in place, saline locked.  IV Abx administered as ordered per provider. BG AC/HS, no SSI coverage needed.  PRN clonidine administered for elevated Bp.  No c/o pain throughout shift.  VS stable.  Pt has remained free of injuries and falls throughout shift.  Environment free of clutter, side rails up x2, and call light within reach.  Pt has verbalized understanding of plan of care.

## 2017-09-13 NOTE — PROGRESS NOTES
"[facility] Medicine  Progress Note    Patient Name: Blaire Cage  MRN: [id number]  Patient Class: IP- Inpatient   Admission Date: 9/11/2017  Length of Stay: 1 days  Attending Physician: Slade Anguiano MD  Primary Care Provider: Eli Edmonds MD        Subjective:     Principal Problem:Pneumonia of right lower lobe due to infectious organism    HPI:  Blaire Cage is an 87 y.o. female with PMHx significant for COPD, DM2, hypothyroidism, and HTN.  She was admitted to the service of hospital medicine with CAP.  She presented to the ED with the gradual onset of fatigue that began x 1 week ago associated with worsening BLE paresthesias, SOB, and "feeling like something is in my throat and chest". She reports she is more fatigued with activity.  She reports an occasional cough, as well as dysphagia.  She states she tries to modify her diet to soft foods only, otherwise she feels like things get stuck.  She reports a "weird sensation" when eating. She denies associated nausea, emesis, but states that she "feels bad". No fever or chills.  Other pertinent medical history as below:    Hospital Course:  No notes on file    Interval History:  A little more energy today.  Evaluated by SLP and GI.    Review of Systems   Constitutional: Negative for chills, fatigue and fever.   Respiratory: Negative for cough and shortness of breath.    Cardiovascular: Negative for chest pain.     Objective:     Vital Signs (Most Recent):  Temp: 98.8 °F (37.1 °C) (09/12/17 1455)  Pulse: 65 (09/12/17 1455)  Resp: 18 (09/12/17 1455)  BP: (!) 185/74 (09/12/17 1455)  SpO2: (!) 92 % (09/12/17 1455) Vital Signs (24h Range):  Temp:  [96.8 °F (36 °C)-99.3 °F (37.4 °C)] 98.8 °F (37.1 °C)  Pulse:  [56-73] 65  Resp:  [17-18] 18  SpO2:  [92 %-99 %] 92 %  BP: (128-185)/(59-75) 185/74     Weight: 61.2 kg (135 lb)  Body mass index is 23.91 kg/m².    Intake/Output Summary (Last 24 hours) at 09/12/17 1900  Last data filed at " 09/12/17 1739   Gross per 24 hour   Intake              540 ml   Output                0 ml   Net              540 ml      Physical Exam   Constitutional: She is oriented to person, place, and time. She appears well-nourished. No distress.   HENT:   Head: Normocephalic and atraumatic.   Eyes: EOM are normal. Right eye exhibits no discharge. Left eye exhibits no discharge.   Neck: Normal range of motion. Neck supple. No JVD present.   Cardiovascular: Normal rate, regular rhythm and intact distal pulses.    Pulmonary/Chest: Effort normal and breath sounds normal. She exhibits no tenderness.   Abdominal: Soft. Bowel sounds are normal. She exhibits no distension. There is no tenderness.   Musculoskeletal: Normal range of motion. She exhibits no edema.   Neurological: She is alert and oriented to person, place, and time. No cranial nerve deficit.   Skin: Skin is warm and dry. No rash noted.   Psychiatric: She has a normal mood and affect. Her behavior is normal.       Significant Labs: All pertinent labs within the past 24 hours have been reviewed.    Significant Imaging: none    Assessment/Plan:      * Pneumonia of right lower lobe due to infectious organism    Continue Azithromycin/ceftriaxone.  DC Flagyl (doubt aspiration).  Cleared by SLP for regular consistency diet and thin liquids.          Dysphagia    Seen by GI consultant today.  No indication for urgent endoscopic evaluation.  Will need EGD as outpatient once pneumonia is cleared up.          COPD (chronic obstructive pulmonary disease)    Now with PNA.  Cont DuoNeb therapy. Supplemental O2. Abx for PNA.  No need for steroid.          Type 2 diabetes mellitus with complication    Chronic, controlled without medications.  Monitor blood glucose and treat hyperglycemia as required for hyperglycemia.          Hyperlipidemia associated with type 2 diabetes mellitus    Chronic, continue statin therapy.          Hypertension associated with diabetes    Chronic,  controlled on current medication.  Monitor BP closely, titrate medication as required for sustained BP control.          Hypothyroid    TSH 16 with low T4-- likely contributing to fatigue.  Increased synthroid to 100 mcg daily-- follow-up TSH outpatient.          CKD (chronic kidney disease), stage III, eGFR 39, progressive 31    Creatinine stable with baseline.  Follow renal panel closely.  Avoid non-essential nephrotoxins.  Renal dose medications as appropriate.             VTE Risk Mitigation         Ordered     enoxaparin injection 30 mg  Daily     Route:  Subcutaneous        09/11/17 3091     Medium Risk of VTE  Once      09/11/17 2046              Slade Anguiano MD  Department of Hospital Medicine   [facility]

## 2017-09-14 ENCOUNTER — TELEPHONE (OUTPATIENT)
Dept: GASTROENTEROLOGY | Facility: CLINIC | Age: 82
End: 2017-09-14

## 2017-09-14 VITALS
SYSTOLIC BLOOD PRESSURE: 154 MMHG | HEIGHT: 63 IN | TEMPERATURE: 97 F | OXYGEN SATURATION: 93 % | WEIGHT: 135 LBS | BODY MASS INDEX: 23.92 KG/M2 | HEART RATE: 72 BPM | DIASTOLIC BLOOD PRESSURE: 67 MMHG | RESPIRATION RATE: 15 BRPM

## 2017-09-14 LAB — POCT GLUCOSE: 117 MG/DL (ref 70–110)

## 2017-09-14 PROCEDURE — 90662 IIV NO PRSV INCREASED AG IM: CPT | Performed by: HOSPITALIST

## 2017-09-14 PROCEDURE — 90471 IMMUNIZATION ADMIN: CPT | Performed by: HOSPITALIST

## 2017-09-14 PROCEDURE — 25000242 PHARM REV CODE 250 ALT 637 W/ HCPCS: Performed by: NURSE PRACTITIONER

## 2017-09-14 PROCEDURE — G0008 ADMIN INFLUENZA VIRUS VAC: HCPCS | Performed by: HOSPITALIST

## 2017-09-14 PROCEDURE — 25000003 PHARM REV CODE 250: Performed by: HOSPITALIST

## 2017-09-14 PROCEDURE — S0030 INJECTION, METRONIDAZOLE: HCPCS | Performed by: NURSE PRACTITIONER

## 2017-09-14 PROCEDURE — 25000003 PHARM REV CODE 250: Performed by: NURSE PRACTITIONER

## 2017-09-14 PROCEDURE — 94640 AIRWAY INHALATION TREATMENT: CPT

## 2017-09-14 PROCEDURE — 63600175 PHARM REV CODE 636 W HCPCS: Performed by: HOSPITALIST

## 2017-09-14 PROCEDURE — 94761 N-INVAS EAR/PLS OXIMETRY MLT: CPT

## 2017-09-14 RX ORDER — AZITHROMYCIN 500 MG/1
500 TABLET, FILM COATED ORAL DAILY
Qty: 7 TABLET | Refills: 0 | Status: SHIPPED | OUTPATIENT
Start: 2017-09-14 | End: 2017-09-21

## 2017-09-14 RX ORDER — CEFDINIR 300 MG/1
300 CAPSULE ORAL 2 TIMES DAILY
Qty: 14 CAPSULE | Refills: 0 | Status: SHIPPED | OUTPATIENT
Start: 2017-09-14 | End: 2017-09-21

## 2017-09-14 RX ORDER — LEVOTHYROXINE SODIUM 100 UG/1
100 TABLET ORAL
Qty: 30 TABLET | Refills: 11 | Status: SHIPPED | OUTPATIENT
Start: 2017-09-15 | End: 2017-12-11 | Stop reason: SDUPTHER

## 2017-09-14 RX ADMIN — METRONIDAZOLE 500 MG: 500 INJECTION, SOLUTION INTRAVENOUS at 09:09

## 2017-09-14 RX ADMIN — ASPIRIN 81 MG: 81 TABLET, COATED ORAL at 09:09

## 2017-09-14 RX ADMIN — LEVOTHYROXINE SODIUM 100 MCG: 100 TABLET ORAL at 05:09

## 2017-09-14 RX ADMIN — INFLUENZA A VIRUSA/MICHIGAN/45/2015 X-275 (H1N1) ANTIGEN (FORMALDEHYDE INACTIVATED), INFLUENZA A VIRUS A/HONG KONG/4801/2014 X-263B (H3N2) ANTIGEN (FORMALDEHYDE INACTIVATED), AND INFLUENZA B VIRUS B/BRISBANE/60/2008 ANTIGEN (FORMALDEHYDE INACTIVATED) 0.5 ML: 60; 60; 60 INJECTION, SUSPENSION INTRAMUSCULAR at 01:09

## 2017-09-14 RX ADMIN — CALCIUM 500 MG: 500 TABLET ORAL at 09:09

## 2017-09-14 RX ADMIN — METRONIDAZOLE 500 MG: 500 INJECTION, SOLUTION INTRAVENOUS at 12:09

## 2017-09-14 RX ADMIN — FERROUS SULFATE TAB EC 325 MG (65 MG FE EQUIVALENT) 325 MG: 325 (65 FE) TABLET DELAYED RESPONSE at 09:09

## 2017-09-14 RX ADMIN — Medication 1 CAPSULE: at 09:09

## 2017-09-14 RX ADMIN — IPRATROPIUM BROMIDE AND ALBUTEROL SULFATE 3 ML: .5; 3 SOLUTION RESPIRATORY (INHALATION) at 07:09

## 2017-09-14 RX ADMIN — IPRATROPIUM BROMIDE AND ALBUTEROL SULFATE 3 ML: .5; 3 SOLUTION RESPIRATORY (INHALATION) at 02:09

## 2017-09-14 RX ADMIN — LOSARTAN POTASSIUM 100 MG: 25 TABLET, FILM COATED ORAL at 09:09

## 2017-09-14 RX ADMIN — FLUTICASONE PROPIONATE 1 SPRAY: 50 SPRAY, METERED NASAL at 09:09

## 2017-09-14 RX ADMIN — OXYCODONE HYDROCHLORIDE AND ACETAMINOPHEN 500 MG: 500 TABLET ORAL at 09:09

## 2017-09-14 NOTE — PLAN OF CARE
Problem: Patient Care Overview  Goal: Plan of Care Review  Outcome: Ongoing (interventions implemented as appropriate)  Pt VSS on RA, free of falls, trauma, injury, and skin breakdowns, ambulates to RR with standby assist, on 1800 ADA, received all ABX ordered this shift, encouraged to cough, turn, and deep breath. Pt in low locked bed, call light in reach, safety precautions maintained.

## 2017-09-14 NOTE — PLAN OF CARE
Problem: Patient Care Overview  Goal: Plan of Care Review  Outcome: Ongoing (interventions implemented as appropriate)  Pt with ongoing IV antibiotic therapy for current pneumonia treatment. Pt requires intermittent supplemental oxygen as needed to maintain o2 sats >92%. Pt ambulates with steady gait and standby assist to and from the bathroom. Pt with fair appetite. Encouraged deep breathing exercises. Pt education regarding sitting up 90 degrees for all meals. Call light in easy reach.

## 2017-09-14 NOTE — NURSING
Discharge instructions reviewed with pt. Pt verbalized understanding regarding discharge instructions. Pt awaiting ride home.

## 2017-09-14 NOTE — NURSING
Pt ride here. Pt escorted to private vehicle by this nurse with daughter at side. Pt discharged at this time in stable condition with daughter.

## 2017-09-14 NOTE — PLAN OF CARE
Problem: Patient Care Overview  Goal: Plan of Care Review  Outcome: Ongoing (interventions implemented as appropriate)  Nc 2 lpm on standby with aerosol txs Q6 hrs W/A.

## 2017-09-14 NOTE — DISCHARGE SUMMARY
"[facility] Medicine  Discharge Summary      Patient Name: Blaire Cage  MRN: [id number]  Admission Date: 9/11/2017  Hospital Length of Stay: 3 days  Discharge Date and Time: 9/14/2017  4:36 PM  Attending Physician: No att. providers found   Discharging Provider: Slade Anguiano MD  Primary Care Provider: Eli Edmonds MD        HPI: Blaire Cage is an 87 y.o. female with PMHx significant for COPD, DM2, hypothyroidism, and HTN.  She was admitted to the service of hospital medicine with CAP.  She presented to the ED with the gradual onset of fatigue that began x 1 week ago associated with worsening BLE paresthesias, SOB, and "feeling like something is in my throat and chest". She reports she is more fatigued with activity.  She reports an occasional cough, as well as dysphagia.  She states she tries to modify her diet to soft foods only, otherwise she feels like things get stuck.  She reports a "weird sensation" when eating. She denies associated nausea, emesis, but states that she "feels bad". No fever or chills.    * No surgery found *      Indwelling Lines/Drains at time of discharge:   Lines/Drains/Airways          No matching active lines, drains, or airways        Hospital Course:   Pt was admitted with pneumonia (cxr showed RLL infiltrate).  Was put on IVAB.  For her dysphagia, she was evaluated by SLP, who found no signs of aspiration and recommended dysphagia diet type 2 and thin liquids.  I consulted with GI about her complaint of food getting stuck in her esophagus.  Dr. Flores did not see an indication for urgent EGD.  He agreed that he could perform endoscopy on her after the pneumonia is resolved.  For pt's fatigue, we checked TFT's, results of which showed that she was probably underdosed with Synthroid;  It was increased.  She improved overall and was discharged home.    PE:  Constitutional: She is oriented to person, place, and time. She appears well-nourished. No " distress.   HENT:   Head: Normocephalic and atraumatic.   Eyes: EOM are normal. Right eye exhibits no discharge. Left eye exhibits no discharge.   Neck: Normal range of motion. Neck supple. No JVD present.   Cardiovascular: Normal rate, regular rhythm and intact distal pulses.    Pulmonary/Chest: Effort normal and breath sounds normal. She exhibits no tenderness.   Abdominal: Soft. Bowel sounds are normal. She exhibits no distension. There is no tenderness.   Musculoskeletal: Normal range of motion. She exhibits no edema.   Neurological: She is alert and oriented to person, place, and time. No cranial nerve deficit.     Consults:   Consults         Status Ordering Provider     Inpatient consult to Gastroenterology  Once     Provider:  Fadi Flores MD    Completed NAI NORIEGA          Significant Diagnostic Studies:   BMP  Lab Results   Component Value Date     09/12/2017    K 4.8 09/12/2017     09/12/2017    CO2 22 (L) 09/12/2017    BUN 34 (H) 09/12/2017    CREATININE 1.7 (H) 09/12/2017    CALCIUM 9.0 09/12/2017    ANIONGAP 9 09/12/2017    ESTGFRAFRICA 31 (A) 09/12/2017    EGFRNONAA 27 (A) 09/12/2017     Lab Results   Component Value Date    WBC 6.20 09/12/2017    HGB 10.7 (L) 09/12/2017    HCT 31.8 (L) 09/12/2017    MCV 97 09/12/2017     09/12/2017     Lab Results   Component Value Date    TSH 16.098 (H) 09/11/2017       Pending Diagnostic Studies:     None        Final Active Diagnoses:    Diagnosis Date Noted POA    PRINCIPAL PROBLEM:  Pneumonia of right lower lobe due to infectious organism [J18.1] 09/11/2017 Yes    Dysphagia [R13.10] 09/12/2017 Yes    COPD (chronic obstructive pulmonary disease) [J44.9] 01/08/2015 Yes    Type 2 diabetes mellitus with complication [E11.8] 01/18/2013 Yes    CKD (chronic kidney disease), stage III, eGFR 39, progressive 31 [N18.3] 01/18/2013 Yes    Hypertension associated with diabetes [E11.59, I10] 01/18/2013 Yes    Hyperlipidemia associated  with type 2 diabetes mellitus [E11.69, E78.5] 01/18/2013 Yes    Hypothyroid [E03.9] 01/18/2013 Yes      Problems Resolved During this Admission:    Diagnosis Date Noted Date Resolved POA      Discharged Condition: stable    Disposition: Home or Self Care    Follow Up:  Follow-up Information     Fadi Flores MD In 2 weeks.    Specialty:  Gastroenterology  Why:  to have an endoscopic procedure to see why you have trouble swallowing.  Sadie with scheduling, sent messge to the nurse to schedule appointmenbt and notify patiient  Contact information:  [address] [facility]  [address]  [facility] [address]  [phone]                 Patient Instructions:     Diet Diabetic 1800 Calories     Activity as tolerated       Medications:  Reconciled Home Medications:   Discharge Medication List as of 9/14/2017  2:58 PM      START taking these medications    Details   azithromycin (ZITHROMAX) 500 MG tablet Take 1 tablet (500 mg total) by mouth once daily., Starting Thu 9/14/2017, Until Thu 9/21/2017, Normal      cefdinir (OMNICEF) 300 MG capsule Take 1 capsule (300 mg total) by mouth 2 (two) times daily., Starting Thu 9/14/2017, Until Thu 9/21/2017, Normal         CONTINUE these medications which have CHANGED    Details   levothyroxine (SYNTHROID) 100 MCG tablet Take 1 tablet (100 mcg total) by mouth before breakfast., Starting Fri 9/15/2017, Until Sat 9/15/2018, Normal         CONTINUE these medications which have NOT CHANGED    Details   albuterol-ipratropium 2.5mg-0.5mg/3mL (DUO-NEB) 0.5 mg-3 mg(2.5 mg base)/3 mL nebulizer solution Take 3 mLs by nebulization every 6 (six) hours while awake. Rescue, Starting Tue 8/22/2017, Normal      amlodipine (NORVASC) 10 MG tablet Take 1 tablet (10 mg total) by mouth once daily., Starting Wed 8/9/2017, Until Thu 8/9/2018, Normal      ascorbic acid (VITAMIN C) 500 MG tablet Take 500 mg by mouth once daily.  , Until Discontinued, Historical Med      aspirin (ECOTRIN) 81 MG EC tablet Take 81 mg  by mouth once daily.  , Until Discontinued, Historical Med      calcium carbonate (OS-TASIA) 500 mg calcium (1,250 mg) tablet Take 1 tablet by mouth 2 (two) times daily.  , Until Discontinued, Historical Med      cloNIDine (CATAPRES) 0.1 MG tablet Take 1 tablet (0.1 mg total) by mouth 2 (two) times daily. Take one tablet by mouth every hour as needed for systolic blood pressure greater than 180.  Do not take more than 3 tablets in 24 hours., Starting Wed 8/9/2017, Until Sat 9/23/2017, Print      ferrous sulfate 325 mg (65 mg iron) Tab tablet Take 1 tablet (325 mg total) by mouth daily with breakfast., Starting Fri 7/14/2017, OTC      fish oil-omega-3 fatty acids 300-1,000 mg capsule Take 2 g by mouth every evening. , Until Discontinued, Historical Med      fluticasone (FLONASE) 50 mcg/actuation nasal spray 1 spray by Each Nare route once daily., Starting 8/26/2016, Until Discontinued, Normal      gabapentin (NEURONTIN) 300 MG capsule Take 1 capsule (300 mg total) by mouth every evening., Starting Mon 5/22/2017, Normal      guanfacine (TENEX) 1 MG Tab Take 1 mg by mouth every evening. Take 1 tablet by mouth in the evening, Historical Med      losartan (COZAAR) 100 MG tablet Take 1 tablet (100 mg total) by mouth once daily., Starting Wed 8/9/2017, Until Thu 8/9/2018, Normal      magnesium oxide (MAG-OX) 400 mg tablet take by mouth once daily, Normal      nitroGLYCERIN (NITROSTAT) 0.4 MG SL tablet Place 1 tablet (0.4 mg total) under the tongue every 5 (five) minutes as needed for Chest pain. As needed, Starting 8/26/2016, Until Discontinued, Normal      simvastatin (ZOCOR) 40 MG tablet TAKE ONE TABLET BY MOUTH ONCE DAILY IN THE EVENING, Normal      vitamin D 1000 units Tab Take 1 tablet (1,000 Units total) by mouth once daily., Starting 8/26/2016, Until Discontinued, OTC           Time spent on the discharge of patient: 25 minutes         Slade Anguiano MD  Department of Hospital Medicine  [facility]

## 2017-09-14 NOTE — TELEPHONE ENCOUNTER
----- Message from Sadie Redd sent at 9/14/2017  1:48 PM CDT -----  Contact: [facility]  Needs to schedule a 2 week hospital follow/up from today for an endoscopy to find out why patient is having trouble swallowing. Please call patient at [phone] (home)

## 2017-09-14 NOTE — PLAN OF CARE
09/14/17 1352   Final Note   Assessment Type Final Discharge Note   Discharge Disposition Home   Hospital Follow Up  Appt(s) scheduled? Yes   Discharge plans and expectations educations in teach back method with documentation complete? Yes

## 2017-09-14 NOTE — PLAN OF CARE
Problem: Patient Care Overview  Goal: Plan of Care Review  Patient aerosol Q6w/a given via msk tolerated well sats 96% on RA

## 2017-09-15 ENCOUNTER — PATIENT OUTREACH (OUTPATIENT)
Dept: ADMINISTRATIVE | Facility: CLINIC | Age: 82
End: 2017-09-15

## 2017-09-15 ENCOUNTER — TELEPHONE (OUTPATIENT)
Dept: MEDSURG UNIT | Facility: HOSPITAL | Age: 82
End: 2017-09-15

## 2017-09-18 ENCOUNTER — DOCUMENTATION ONLY (OUTPATIENT)
Dept: FAMILY MEDICINE | Facility: CLINIC | Age: 82
End: 2017-09-18

## 2017-09-18 NOTE — PROGRESS NOTES
Pre-Visit Chart Review  For Appointment Scheduled on 09/21/2017    Health Maintenance Due   Topic Date Due    TETANUS VACCINE  07/21/1948    Eye Exam  10/30/2016

## 2017-09-21 ENCOUNTER — OFFICE VISIT (OUTPATIENT)
Dept: FAMILY MEDICINE | Facility: CLINIC | Age: 82
End: 2017-09-21
Payer: MEDICARE

## 2017-09-21 VITALS
HEART RATE: 55 BPM | SYSTOLIC BLOOD PRESSURE: 125 MMHG | BODY MASS INDEX: 24.06 KG/M2 | TEMPERATURE: 98 F | HEIGHT: 63 IN | WEIGHT: 135.81 LBS | DIASTOLIC BLOOD PRESSURE: 57 MMHG

## 2017-09-21 DIAGNOSIS — R13.10 DYSPHAGIA, UNSPECIFIED TYPE: ICD-10-CM

## 2017-09-21 DIAGNOSIS — J18.9 CAP (COMMUNITY ACQUIRED PNEUMONIA): ICD-10-CM

## 2017-09-21 DIAGNOSIS — Z09 HOSPITAL DISCHARGE FOLLOW-UP: Primary | ICD-10-CM

## 2017-09-21 DIAGNOSIS — E03.9 HYPOTHYROIDISM, UNSPECIFIED TYPE: ICD-10-CM

## 2017-09-21 PROCEDURE — 99495 TRANSJ CARE MGMT MOD F2F 14D: CPT | Mod: S$GLB,,, | Performed by: PHYSICIAN ASSISTANT

## 2017-09-21 PROCEDURE — 99999 PR PBB SHADOW E&M-EST. PATIENT-LVL III: CPT | Mod: PBBFAC,,, | Performed by: PHYSICIAN ASSISTANT

## 2017-09-21 PROCEDURE — 99499 UNLISTED E&M SERVICE: CPT | Mod: S$PBB,,, | Performed by: PHYSICIAN ASSISTANT

## 2017-09-21 NOTE — PROGRESS NOTES
Subjective:       Patient ID: Blaire Cage is a 87 y.o. female.    Chief Complaint: Transitional Care    Transitional Care Note  Admit date: 9/11/2017  Discharge date: 9/14/2017  Date of interactive contact (2 business days post D/C): 9/15/2017  Hospitalized at: [facility]   Discharge diagnoses: CAP    Family and/or Caretaker present at visit?  No.  Diagnostic tests reviewed/disposition: No diagnosic tests pending after this hospitalization.  Disease/illness education: reviewed   Medication changes: reconciled   Home health/community services discussion/referrals: Patient does not have home health established from hospital visit.  They do not need home health.  If needed, we will set up home health for the patient.   Establishment or re-establishment of referral orders for community resources: No other necessary community resources.   Discussion with other health care providers: No discussion with other health care providers necessary       Pt was admitted with pneumonia (cxr showed RLL infiltrate).Was put on IVAB.For her dysphagia, she was evaluated by SLP, who found no signs of aspiration and recommended dysphagia diet type 2 and thin liquids.Dr. Flores agreed that he could perform endoscopy on her after the pneumonia is resolved.  For pt's fatigue, we checked TFT's, results of which showed that she was probably underdosed with Synthroid;  It was increased.    Since being home she is feeling well.  She has not completed antibiotics.  She denies fever or cough.  She plans to follow up with GI in 2 weeks.  She is tolerating a pureed diet.              Review of Systems   Constitutional: Negative for activity change, appetite change, chills, diaphoresis, fatigue, fever and unexpected weight change.   HENT: Positive for trouble swallowing. Negative for sore throat and voice change.    Respiratory: Negative for cough, choking, chest tightness and shortness of breath.    Cardiovascular: Negative for chest  pain, palpitations and leg swelling.   Gastrointestinal: Negative for abdominal pain, constipation, diarrhea, nausea and vomiting.   Genitourinary: Negative for difficulty urinating, flank pain and frequency.   Musculoskeletal: Positive for arthralgias.   Skin: Negative for rash.   Neurological: Negative for dizziness, weakness, light-headedness, numbness and headaches.   Psychiatric/Behavioral: Negative for sleep disturbance.       Objective:      Physical Exam   Constitutional: She appears well-developed and well-nourished. She is cooperative. No distress.   Eyes: Conjunctivae and EOM are normal. Pupils are equal, round, and reactive to light. No scleral icterus.   Cardiovascular: Normal rate, regular rhythm and normal heart sounds.    Pulmonary/Chest: Effort normal and breath sounds normal.   Abdominal: Soft. Bowel sounds are normal.   Musculoskeletal:        Right lower leg: She exhibits no edema.        Left lower leg: She exhibits no edema.   Neurological: She is alert.   Skin: Skin is warm and dry.   Psychiatric: She has a normal mood and affect. Her speech is normal. Judgment normal. Cognition and memory are normal.   Vitals reviewed.      Assessment:       1. Hospital discharge follow-up    2. CAP (community acquired pneumonia)    3. Dysphagia, unspecified type    4. Hypothyroidism, unspecified type        Plan:       Blaire was seen today for transitional care.    Diagnoses and all orders for this visit:    Hospital discharge follow-up    CAP (community acquired pneumonia)  Comments:  clinically resolved   repeat cxr 6 weeks    Orders:  -     X-Ray Chest PA And Lateral; Future    Dysphagia, unspecified type  Comments:  follow up GI  continue pureed diet     Hypothyroidism, unspecified type  Comments:  repeat tsh in 6 weeks due to change in dose during hospitalization   Orders:  -     TSH; Future

## 2017-09-29 ENCOUNTER — TELEPHONE (OUTPATIENT)
Dept: FAMILY MEDICINE | Facility: CLINIC | Age: 82
End: 2017-09-29

## 2017-09-29 NOTE — TELEPHONE ENCOUNTER
Spoke with patient earlier this morning, she was transferred by the phone room due to low BP. She said her BP was 76/38 with her home machine. This was after she had taken her losartan. She said she wasn't really syptomatic just occasional slight dizziness. She retook her BP while I was on the phone and her BP had come up to 98/68. She was no longer worried. I advised patient to continue to monitor and if this continues that she would need to come in and be seen. She verbalized understanding.

## 2017-09-29 NOTE — TELEPHONE ENCOUNTER
----- Message from Fe Ventura sent at 9/29/2017 11:04 AM CDT -----  Contact: patient  Patient calling to speak with the Nurse about her blood pressure being 76/38. Please advise. Call to pod. Call connected to pod. Warm transferred.  Thanks!

## 2017-09-30 ENCOUNTER — HOSPITAL ENCOUNTER (EMERGENCY)
Facility: HOSPITAL | Age: 82
Discharge: HOME | End: 2017-09-30
Attending: EMERGENCY MEDICINE
Payer: MEDICARE

## 2017-09-30 VITALS
RESPIRATION RATE: 20 BRPM | SYSTOLIC BLOOD PRESSURE: 131 MMHG | OXYGEN SATURATION: 94 % | DIASTOLIC BLOOD PRESSURE: 61 MMHG | WEIGHT: 135 LBS | HEART RATE: 75 BPM | BODY MASS INDEX: 23.92 KG/M2 | TEMPERATURE: 99 F | HEIGHT: 63 IN

## 2017-09-30 DIAGNOSIS — M25.512 LEFT SHOULDER PAIN: Primary | ICD-10-CM

## 2017-09-30 PROCEDURE — 99283 EMERGENCY DEPT VISIT LOW MDM: CPT

## 2017-09-30 NOTE — ED NOTES
Pt aaox3, resp even and unlabored, skin pink warm and dry. Pt reports pain to left shoulder. Pt has hx osteoporosis and did not receive last injection. Nadn. Safety maintained. Daughter at bedside

## 2017-09-30 NOTE — ED PROVIDER NOTES
Encounter Date: 9/30/2017       History     Chief Complaint   Patient presents with    Shoulder Pain     denies injury / osteoporosis     Patient is an 87 year old female who presents with left shoulder pain for four days. She has PMH significant for DM-2, COPD, hypertension, hyperlipidemia and hypothyroidism. She reports progressively worsening pain. She states she has taken tylenol with mild improvement. She also reports using a heating pad. She reports the pain is worse with movement. She denied any injury or trauma. She denied prior shoulder injury. She does report a history of osteoporosis. She denied chest pain, shortness of breath, nausea, vomiting or diaphoresis.       The history is provided by the patient and a relative.     Review of patient's allergies indicates:   Allergen Reactions    Carvedilol Other (See Comments)     Bradycardia and syncope    Boniva [ibandronate]     Codeine     Hydralazine analogues     Iodinated contrast- oral and iv dye Hives    Lisinopril     Morphine      Past Medical History:   Diagnosis Date    Anticoagulant long-term use     aspirin    COPD (chronic obstructive pulmonary disease)     COPD with acute exacerbation 1/9/2015    Diabetes mellitus type II     Encounter for blood transfusion     Hip arthritis 3/1/2016    Hyperlipidemia     Hypertension     Pneumonia of right lower lobe due to infectious organism 9/11/2017    Thyroid disease     hypothyroid     Past Surgical History:   Procedure Laterality Date    APPENDECTOMY      FRACTURE SURGERY      right hip     HERNIA REPAIR      groin    HYSTERECTOMY      VARICOSE VEIN SURGERY       Family History   Problem Relation Age of Onset    Hypertension Mother     Diabetes Sister     Diabetes Brother     Kidney disease Son      Social History   Substance Use Topics    Smoking status: Former Smoker     Years: 44.00     Types: Cigarettes     Quit date: 4/30/2015    Smokeless tobacco: Never Used       Comment: 1/08/2015    Alcohol use No     Review of Systems   Constitutional: Negative for chills and fever.   HENT: Negative for congestion, rhinorrhea and sore throat.    Respiratory: Negative for cough, chest tightness and shortness of breath.    Cardiovascular: Negative for chest pain.   Gastrointestinal: Negative for abdominal pain, diarrhea, nausea and vomiting.   Genitourinary: Negative for dysuria and frequency.   Musculoskeletal: Negative for back pain, neck pain and neck stiffness.        + left shoulder pain   Skin: Negative for rash.   Neurological: Negative for dizziness, syncope, numbness and headaches.       Physical Exam     Initial Vitals [09/30/17 1451]   BP Pulse Resp Temp SpO2   131/61 75 20 98.8 °F (37.1 °C) (!) 94 %      MAP       84.33         Physical Exam    Constitutional: Vital signs are normal. She appears well-developed and well-nourished. She is cooperative.  Non-toxic appearance. She does not have a sickly appearance.   HENT:   Head: Normocephalic and atraumatic.   Right Ear: External ear normal.   Left Ear: External ear normal.   Nose: Nose normal.   Mouth/Throat: Oropharynx is clear and moist.   Eyes: Conjunctivae and lids are normal. Pupils are equal, round, and reactive to light.   Neck: Normal range of motion and full passive range of motion without pain. Neck supple.   Cardiovascular: Normal rate and regular rhythm.   Pulmonary/Chest: Breath sounds normal. No respiratory distress. She has no wheezes.   Abdominal: Soft. Normal appearance. There is no tenderness. There is no rigidity, no rebound and no guarding.   Musculoskeletal:        Left shoulder: She exhibits tenderness and bony tenderness. She exhibits normal range of motion, no swelling, normal pulse and normal strength.        Arms:  Tenderness to the parascapular area. She has pain with full ROM to the shoulder. She has no swelling, erythema or warmth.    Neurological: She is alert and oriented to person, place, and time.    Skin: Skin is warm, dry and intact. No rash noted.         ED Course   Procedures  Labs Reviewed - No data to display          Medical Decision Making:   History:   I obtained history from: someone other than patient.  Old Medical Records: I decided to obtain old medical records.  Clinical Tests:   Radiological Study: Ordered       APC / Resident Notes:   This is an urgent evaluation of an 87 year old female who presents with left shoulder pain. She has pain to the parascapular area. It is reproducible with palpation and movement. She denied chest pain or shortness of breath. She is concerned about a fracture secondary to her osteoporosis. Xray shows no acute fracture. She will follow up with ortho. Discussed results with patient. Return precautions given. Patient is to follow up with their primary care provider. Case was discussed with Dr. Dudley who has evaluated the patient and is in agreement with the plan of care. All questions answered.                 ED Course as of Sep 30 1753   Sat Sep 30, 2017   1643 XR L shoulder:  No fx or dislocation. (my read)  [MR]      ED Course User Index  [MR] Harpreet Dudley MD     Clinical Impression:   The encounter diagnosis was Left shoulder pain.                           Clarisa Hutton PA-C  09/30/17 0632

## 2017-09-30 NOTE — DISCHARGE INSTRUCTIONS
Take tylenol as needed for pain.  See your primary care provider in one week.  Make an appointment with an orthopedist  For worsening symptoms, chest pain, shortness of breath, increased abdominal pain, high grade fever, stroke or stroke like symptoms, immediately go to the nearest Emergency Room or call 911 as soon as possible.

## 2017-10-05 ENCOUNTER — OFFICE VISIT (OUTPATIENT)
Dept: GASTROENTEROLOGY | Facility: CLINIC | Age: 82
End: 2017-10-05
Payer: MEDICARE

## 2017-10-05 VITALS
WEIGHT: 134.5 LBS | BODY MASS INDEX: 23.83 KG/M2 | HEIGHT: 63 IN | SYSTOLIC BLOOD PRESSURE: 123 MMHG | HEART RATE: 72 BPM | DIASTOLIC BLOOD PRESSURE: 61 MMHG

## 2017-10-05 DIAGNOSIS — R13.10 DYSPHAGIA, UNSPECIFIED TYPE: ICD-10-CM

## 2017-10-05 DIAGNOSIS — J43.8 OTHER EMPHYSEMA: Primary | ICD-10-CM

## 2017-10-05 DIAGNOSIS — K59.03 DRUG-INDUCED CONSTIPATION: ICD-10-CM

## 2017-10-05 PROCEDURE — 99999 PR PBB SHADOW E&M-EST. PATIENT-LVL III: CPT | Mod: PBBFAC,,, | Performed by: INTERNAL MEDICINE

## 2017-10-05 PROCEDURE — 99214 OFFICE O/P EST MOD 30 MIN: CPT | Mod: S$GLB,,, | Performed by: INTERNAL MEDICINE

## 2017-10-05 PROCEDURE — 99499 UNLISTED E&M SERVICE: CPT | Mod: S$PBB,,, | Performed by: INTERNAL MEDICINE

## 2017-10-05 NOTE — PROGRESS NOTES
"Subjective:       Patient ID: Blaire Cage is a 87 y.o. female.    This is an established patient.      Chief Complaint: Dysphagia    PAST HISTORY:    87 year old female with dysphagia, remote onset, associated with difficulty with solid/coarse foods, with no associated bleeding or weight loss, and with no alleviating/exacerbating factors.  She states that this has been going on for quite some time.  She has had unremarkable EGDs with Dr. Peoples in the remote past with no significant findings.  She has had colonoscopy in 2010 which was unremarkable with no further colonoscopy recommended secondary to age.  She does have a history of smoking.  She is currently admitted with a RLL pneumonia which is worse on most recent imaging.  She also has complaint of some chronic constipation.  She had normal esophagram with no filling defect and no evidence of aspiration in June.    INTERVAL HISTORY:    Since discharge from the hospital she has done well and states that her respiratory symptoms are much improved.  She still has intermittent solid food dysphagia and dysphagia with pills.  She notes that she has no problem with soft and/or pureed foods.  No other complaints at this time.  She is not anticoagulated.      Review of Systems   Constitutional: Negative for chills, fatigue and fever.   HENT: Positive for trouble swallowing. Negative for sore throat.    Respiratory: Negative for cough, shortness of breath and wheezing.    Cardiovascular: Negative for chest pain and palpitations.   Gastrointestinal: Negative for abdominal pain, blood in stool, nausea and vomiting.   Genitourinary: Negative for dysuria and hematuria.   Neurological: Negative for dizziness.   All other systems reviewed and are negative.      Objective:       Vitals:    10/05/17 1438   BP: 123/61   Pulse: 72   Weight: 61 kg (134 lb 7.7 oz)   Height: 5' 3" (1.6 m)       Physical Exam   Constitutional: She is oriented to person, place, and time. She " appears well-developed and well-nourished.   HENT:   Head: Normocephalic and atraumatic.   Eyes: Pupils are equal, round, and reactive to light. No scleral icterus.   Cardiovascular: Normal rate and regular rhythm.    No murmur heard.  Pulmonary/Chest: Effort normal and breath sounds normal. She has no wheezes.   Abdominal: Soft. Bowel sounds are normal. She exhibits no distension. There is no tenderness.   Musculoskeletal: She exhibits no edema or tenderness.   Neurological: She is alert and oriented to person, place, and time.         CMP  Sodium   Date Value Ref Range Status   09/12/2017 141 136 - 145 mmol/L Final     Potassium   Date Value Ref Range Status   09/12/2017 4.8 3.5 - 5.1 mmol/L Final     Chloride   Date Value Ref Range Status   09/12/2017 110 95 - 110 mmol/L Final     CO2   Date Value Ref Range Status   09/12/2017 22 (L) 23 - 29 mmol/L Final     Glucose   Date Value Ref Range Status   09/12/2017 112 (H) 70 - 110 mg/dL Final     BUN, Bld   Date Value Ref Range Status   09/12/2017 34 (H) 8 - 23 mg/dL Final     Creatinine   Date Value Ref Range Status   09/12/2017 1.7 (H) 0.5 - 1.4 mg/dL Final   03/04/2013 1.1 0.5 - 1.4 mg/dL Final     Calcium   Date Value Ref Range Status   09/12/2017 9.0 8.7 - 10.5 mg/dL Final   03/04/2013 9.8 8.7 - 10.5 mg/dL Final     Total Protein   Date Value Ref Range Status   09/12/2017 6.5 6.0 - 8.4 g/dL Final     Albumin   Date Value Ref Range Status   09/12/2017 3.3 (L) 3.5 - 5.2 g/dL Final     Total Bilirubin   Date Value Ref Range Status   09/12/2017 0.2 0.1 - 1.0 mg/dL Final     Comment:     For infants and newborns, interpretation of results should be based  on gestational age, weight and in agreement with clinical  observations.  Premature Infant recommended reference ranges:  Up to 24 hours.............<8.0 mg/dL  Up to 48 hours............<12.0 mg/dL  3-5 days..................<15.0 mg/dL  6-29 days.................<15.0 mg/dL       Alkaline Phosphatase   Date Value  Ref Range Status   09/12/2017 58 55 - 135 U/L Final   03/04/2013 60 55 - 135 U/L Final     AST   Date Value Ref Range Status   09/12/2017 27 10 - 40 U/L Final   03/04/2013 21 10 - 40 U/L Final     ALT   Date Value Ref Range Status   09/12/2017 17 10 - 44 U/L Final     Anion Gap   Date Value Ref Range Status   09/12/2017 9 8 - 16 mmol/L Final   03/04/2013 10 5 - 15 meq/L Final     eGFR if    Date Value Ref Range Status   09/12/2017 31 (A) >60 mL/min/1.73 m^2 Final     eGFR if non    Date Value Ref Range Status   09/12/2017 27 (A) >60 mL/min/1.73 m^2 Final     Comment:     Calculation used to obtain the estimated glomerular filtration  rate (eGFR) is the CKD-EPI equation. Since race is unknown   in our information system, the eGFR values for   -American and Non--American patients are given   for each creatinine result.         Lab Results   Component Value Date    WBC 6.20 09/12/2017    HGB 10.7 (L) 09/12/2017    HCT 31.8 (L) 09/12/2017    MCV 97 09/12/2017     09/12/2017         Assessment:       1. Other emphysema    2. Dysphagia, unspecified type    3. Drug-induced constipation        Plan:       1.  Schedule EGD  2.  Further recommendations to follow after above.

## 2017-10-11 ENCOUNTER — OFFICE VISIT (OUTPATIENT)
Dept: CARDIOLOGY | Facility: CLINIC | Age: 82
End: 2017-10-11
Payer: MEDICARE

## 2017-10-11 VITALS
HEART RATE: 68 BPM | WEIGHT: 134.25 LBS | SYSTOLIC BLOOD PRESSURE: 139 MMHG | DIASTOLIC BLOOD PRESSURE: 66 MMHG | HEIGHT: 63 IN | BODY MASS INDEX: 23.79 KG/M2 | OXYGEN SATURATION: 96 %

## 2017-10-11 DIAGNOSIS — E11.59 HYPERTENSION ASSOCIATED WITH DIABETES: Primary | ICD-10-CM

## 2017-10-11 DIAGNOSIS — I11.9 HYPERTENSIVE LEFT VENTRICULAR HYPERTROPHY, WITHOUT HEART FAILURE: ICD-10-CM

## 2017-10-11 DIAGNOSIS — I15.2 HYPERTENSION ASSOCIATED WITH DIABETES: Primary | ICD-10-CM

## 2017-10-11 DIAGNOSIS — I51.89 LEFT VENTRICULAR DIASTOLIC DYSFUNCTION WITH PRESERVED SYSTOLIC FUNCTION: ICD-10-CM

## 2017-10-11 PROCEDURE — 99499 UNLISTED E&M SERVICE: CPT | Mod: S$PBB,,, | Performed by: INTERNAL MEDICINE

## 2017-10-11 PROCEDURE — 99999 PR PBB SHADOW E&M-EST. PATIENT-LVL III: CPT | Mod: PBBFAC,,, | Performed by: INTERNAL MEDICINE

## 2017-10-11 PROCEDURE — 99213 OFFICE O/P EST LOW 20 MIN: CPT | Mod: S$GLB,,, | Performed by: INTERNAL MEDICINE

## 2017-10-11 NOTE — PROGRESS NOTES
[facility]    CC: Follow-up for blood pressure  Chief Complaint   Patient presents with    Follow-up       Patient ID: Blaire Cage is a 87 y.o. female with a past medical history of  difficult to control hypertension    HPI  Patient's blood pressure has been very well controlled with the changes that we did.  Denies any further chest pain or shortness of breath.    Past Medical History:   Diagnosis Date    Anticoagulant long-term use     aspirin    COPD (chronic obstructive pulmonary disease)     COPD with acute exacerbation 1/9/2015    Diabetes mellitus type II     Encounter for blood transfusion     Hip arthritis 3/1/2016    Hyperlipidemia     Hypertension     Pneumonia of right lower lobe due to infectious organism 9/11/2017    Thyroid disease     hypothyroid     Past Surgical History:   Procedure Laterality Date    APPENDECTOMY      FRACTURE SURGERY      right hip     HERNIA REPAIR      groin    HYSTERECTOMY      VARICOSE VEIN SURGERY       Social History     Social History    Marital status: Legally      Spouse name: N/A    Number of children: N/A    Years of education: N/A     Occupational History    Not on file.     Social History Main Topics    Smoking status: Former Smoker     Years: 44.00     Types: Cigarettes     Quit date: 4/30/2015    Smokeless tobacco: Never Used      Comment: 1/08/2015    Alcohol use No    Drug use: No    Sexual activity: No     Other Topics Concern    Not on file     Social History Narrative    No narrative on file     Family History   Problem Relation Age of Onset    Hypertension Mother     Diabetes Sister     Diabetes Brother     Kidney disease Son        Review of patient's allergies indicates:   Allergen Reactions    Carvedilol Other (See Comments)     Bradycardia and syncope    Boniva [ibandronate]     Codeine     Hydralazine analogues     Iodinated contrast- oral and iv dye Hives    Lisinopril     Morphine         Medication List with Changes/Refills   Current Medications    ALBUTEROL-IPRATROPIUM 2.5MG-0.5MG/3ML (DUO-NEB) 0.5 MG-3 MG(2.5 MG BASE)/3 ML NEBULIZER SOLUTION    Take 3 mLs by nebulization every 6 (six) hours while awake. Rescue    AMLODIPINE (NORVASC) 10 MG TABLET    Take 1 tablet (10 mg total) by mouth once daily.    ASCORBIC ACID (VITAMIN C) 500 MG TABLET    Take 500 mg by mouth once daily.      ASPIRIN (ECOTRIN) 81 MG EC TABLET    Take 81 mg by mouth once daily.      CALCIUM CARBONATE (OS-TASIA) 500 MG CALCIUM (1,250 MG) TABLET    Take 1 tablet by mouth 2 (two) times daily.      CLONIDINE (CATAPRES) 0.1 MG TABLET    Take 1 tablet (0.1 mg total) by mouth 2 (two) times daily. Take one tablet by mouth every hour as needed for systolic blood pressure greater than 180.  Do not take more than 3 tablets in 24 hours.    FERROUS SULFATE 325 MG (65 MG IRON) TAB TABLET    Take 1 tablet (325 mg total) by mouth daily with breakfast.    FISH OIL-OMEGA-3 FATTY ACIDS 300-1,000 MG CAPSULE    Take 2 g by mouth every evening.     FLUTICASONE (FLONASE) 50 MCG/ACTUATION NASAL SPRAY    1 spray by Each Nare route once daily.    GABAPENTIN (NEURONTIN) 300 MG CAPSULE    Take 1 capsule (300 mg total) by mouth every evening.    GUANFACINE (TENEX) 1 MG TAB    Take 1 mg by mouth every evening. Take 1 tablet by mouth in the evening    LEVOTHYROXINE (SYNTHROID) 100 MCG TABLET    Take 1 tablet (100 mcg total) by mouth before breakfast.    LOSARTAN (COZAAR) 100 MG TABLET    Take 1 tablet (100 mg total) by mouth once daily.    MAGNESIUM OXIDE (MAG-OX) 400 MG TABLET    take by mouth once daily    NITROGLYCERIN (NITROSTAT) 0.4 MG SL TABLET    Place 1 tablet (0.4 mg total) under the tongue every 5 (five) minutes as needed for Chest pain. As needed    SIMVASTATIN (ZOCOR) 40 MG TABLET    TAKE ONE TABLET BY MOUTH ONCE DAILY IN THE EVENING    VITAMIN D 1000 UNITS TAB    Take 1 tablet (1,000 Units total) by mouth once daily.       Review of  "Systems  Constitution: Denies chills, fever, and sweats.  HENT: Denies headaches or blurry vision.  Cardiovascular: Denies chest pain or irregular heart beat.  Respiratory: Denies cough or shortness of breath.  Gastrointestinal: Denies abdominal pain, nausea, or vomiting.  Musculoskeletal: Denies muscle cramps.  Neurological: Denies dizziness or focal weakness.  Psychiatric/Behavioral: Normal mental status.  Hematologic/Lymphatic: Denies bleeding problem or easy bruising/bleeding.  Skin: Denies rash or suspicious lesions    Physical Examination  /66 (BP Location: Left arm)   Pulse 68   Ht 5' 3" (1.6 m)   Wt 60.9 kg (134 lb 4.2 oz)   LMP  (LMP Unknown)   SpO2 96%   BMI 23.78 kg/m²     Constitutional: No acute distress, conversant  HEENT: Sclera anicteric, Pupils equal, round and reactive to light, extraocular motions intact, Oropharynx clear  Neck: No JVD, no carotid bruits  Cardiovascular: regular rate and rhythm, no murmur, rubs or gallops, normal S1/S2  Pulmonary: Clear to auscultation bilaterally  Abdominal: Abdomen soft, nontender, nondistended, positive bowel sounds  Extremities: No lower extremity edema,   Pulses:  Carotid pulses are 2+ on the right side, and 2+ on the left side.  Radial pulses are 2+ on the right side, and 2+ on the left side.      Skin: No ecchymosis, erythema, or ulcers  Psych: Alert and oriented x 3, appropriate affect  Neuro: CNII-XII intact, no focal deficits    Labs:  Most Recent Data  CBC:   Lab Results   Component Value Date    WBC 6.20 09/12/2017    HGB 10.7 (L) 09/12/2017    HCT 31.8 (L) 09/12/2017     09/12/2017    MCV 97 09/12/2017    RDW 14.1 09/12/2017     BMP:   Lab Results   Component Value Date     09/12/2017    K 4.8 09/12/2017     09/12/2017    CO2 22 (L) 09/12/2017    BUN 34 (H) 09/12/2017    CREATININE 1.7 (H) 09/12/2017     (H) 09/12/2017    CALCIUM 9.0 09/12/2017    MG 2.3 09/12/2017    PHOS 3.6 09/12/2017     LFTS;   Lab Results "   Component Value Date    PROT 6.5 09/12/2017    ALBUMIN 3.3 (L) 09/12/2017    BILITOT 0.2 09/12/2017    AST 27 09/12/2017    ALKPHOS 58 09/12/2017    ALT 17 09/12/2017     COAGS:   Lab Results   Component Value Date    INR 1.0 09/11/2017     FLP:   Lab Results   Component Value Date    CHOL 149 05/23/2017    HDL 52 05/23/2017    LDLCALC 78.2 05/23/2017    TRIG 94 05/23/2017    CHOLHDL 34.9 05/23/2017     CARDIAC:   Lab Results   Component Value Date    TROPONINI <0.006 09/11/2017     (H) 09/20/2016       Imaging:      I have personally reviewed these images and echo data    Assessment/Plan:  Blaire was seen today for follow-up.    Diagnoses and all orders for this visit:    Hypertension associated with diabetes    Left ventricular diastolic dysfunction with preserved systolic function    Hypertensive left ventricular hypertrophy, without heart failure        Patient is doing well.  The blood pressure is finally under control.  We will continue her on the losartan and amlodipine combination.  See her back in 6 months time        Return in about 6 months (around 4/11/2018).           Total duration of face to face visit time 30 minutes.  Total time spent counseling greater than fifty percent of total visit time.  Counseling included discussion regarding imaging findings, diagnosis, possibilities, treatment options, risks and benefits.  The patient had many questions regarding the options and long-term effect which were all answered to my best ability.      Alfredito Trevino MD,MRCP,RPVI,FACC,FSCAI.  Interventional Cardiology   Phone [phone]

## 2017-10-14 ENCOUNTER — HOSPITAL ENCOUNTER (OUTPATIENT)
Facility: HOSPITAL | Age: 82
Discharge: HOME | End: 2017-10-16
Attending: EMERGENCY MEDICINE | Admitting: INTERNAL MEDICINE
Payer: MEDICARE

## 2017-10-14 DIAGNOSIS — E87.5 HYPERKALEMIA: ICD-10-CM

## 2017-10-14 DIAGNOSIS — J44.1 COPD WITH ACUTE EXACERBATION: Primary | ICD-10-CM

## 2017-10-14 DIAGNOSIS — J44.9 COPD (CHRONIC OBSTRUCTIVE PULMONARY DISEASE): ICD-10-CM

## 2017-10-14 LAB
ANION GAP SERPL CALC-SCNC: 11 MMOL/L
BASOPHILS # BLD AUTO: 0 K/UL
BASOPHILS NFR BLD: 0.3 %
BUN SERPL-MCNC: 36 MG/DL
CALCIUM SERPL-MCNC: 9.7 MG/DL
CHLORIDE SERPL-SCNC: 97 MMOL/L
CO2 SERPL-SCNC: 24 MMOL/L
CREAT SERPL-MCNC: 1.8 MG/DL
DIFFERENTIAL METHOD: ABNORMAL
EOSINOPHIL # BLD AUTO: 0.7 K/UL
EOSINOPHIL NFR BLD: 8.4 %
ERYTHROCYTE [DISTWIDTH] IN BLOOD BY AUTOMATED COUNT: 13.8 %
EST. GFR  (AFRICAN AMERICAN): 29 ML/MIN/1.73 M^2
EST. GFR  (NON AFRICAN AMERICAN): 25 ML/MIN/1.73 M^2
GLUCOSE SERPL-MCNC: 116 MG/DL
HCT VFR BLD AUTO: 31.9 %
HGB BLD-MCNC: 10.9 G/DL
LYMPHOCYTES # BLD AUTO: 1.7 K/UL
LYMPHOCYTES NFR BLD: 20.8 %
MCH RBC QN AUTO: 32.6 PG
MCHC RBC AUTO-ENTMCNC: 34 G/DL
MCV RBC AUTO: 96 FL
MONOCYTES # BLD AUTO: 0.6 K/UL
MONOCYTES NFR BLD: 7 %
NEUTROPHILS # BLD AUTO: 5.2 K/UL
NEUTROPHILS NFR BLD: 63.5 %
PLATELET # BLD AUTO: 263 K/UL
PMV BLD AUTO: 7.9 FL
POTASSIUM SERPL-SCNC: 5.5 MMOL/L
RBC # BLD AUTO: 3.33 M/UL
SODIUM SERPL-SCNC: 132 MMOL/L
TROPONIN I SERPL DL<=0.01 NG/ML-MCNC: <0.006 NG/ML
WBC # BLD AUTO: 8.2 K/UL

## 2017-10-14 PROCEDURE — 94761 N-INVAS EAR/PLS OXIMETRY MLT: CPT

## 2017-10-14 PROCEDURE — 25000242 PHARM REV CODE 250 ALT 637 W/ HCPCS: Performed by: NURSE PRACTITIONER

## 2017-10-14 PROCEDURE — 96374 THER/PROPH/DIAG INJ IV PUSH: CPT

## 2017-10-14 PROCEDURE — 27000221 HC OXYGEN, UP TO 24 HOURS

## 2017-10-14 PROCEDURE — 99285 EMERGENCY DEPT VISIT HI MDM: CPT | Mod: 25

## 2017-10-14 PROCEDURE — 63600175 PHARM REV CODE 636 W HCPCS: Performed by: EMERGENCY MEDICINE

## 2017-10-14 PROCEDURE — 36415 COLL VENOUS BLD VENIPUNCTURE: CPT

## 2017-10-14 PROCEDURE — 94640 AIRWAY INHALATION TREATMENT: CPT

## 2017-10-14 PROCEDURE — 93010 ELECTROCARDIOGRAM REPORT: CPT | Mod: ,,, | Performed by: INTERNAL MEDICINE

## 2017-10-14 PROCEDURE — 84484 ASSAY OF TROPONIN QUANT: CPT

## 2017-10-14 PROCEDURE — 85025 COMPLETE CBC W/AUTO DIFF WBC: CPT

## 2017-10-14 PROCEDURE — G0378 HOSPITAL OBSERVATION PER HR: HCPCS

## 2017-10-14 PROCEDURE — 25000003 PHARM REV CODE 250: Performed by: EMERGENCY MEDICINE

## 2017-10-14 PROCEDURE — 99900035 HC TECH TIME PER 15 MIN (STAT)

## 2017-10-14 PROCEDURE — 80048 BASIC METABOLIC PNL TOTAL CA: CPT

## 2017-10-14 PROCEDURE — 93005 ELECTROCARDIOGRAM TRACING: CPT

## 2017-10-14 PROCEDURE — 25000242 PHARM REV CODE 250 ALT 637 W/ HCPCS: Performed by: EMERGENCY MEDICINE

## 2017-10-14 RX ORDER — IPRATROPIUM BROMIDE 0.5 MG/2.5ML
0.5 SOLUTION RESPIRATORY (INHALATION)
Status: COMPLETED | OUTPATIENT
Start: 2017-10-14 | End: 2017-10-14

## 2017-10-14 RX ORDER — IPRATROPIUM BROMIDE AND ALBUTEROL SULFATE 2.5; .5 MG/3ML; MG/3ML
3 SOLUTION RESPIRATORY (INHALATION)
Status: COMPLETED | OUTPATIENT
Start: 2017-10-14 | End: 2017-10-14

## 2017-10-14 RX ORDER — ALBUTEROL SULFATE 2.5 MG/.5ML
10 SOLUTION RESPIRATORY (INHALATION) CONTINUOUS
Status: DISCONTINUED | OUTPATIENT
Start: 2017-10-14 | End: 2017-10-16 | Stop reason: HOSPADM

## 2017-10-14 RX ORDER — SIMVASTATIN 40 MG/1
40 TABLET, FILM COATED ORAL NIGHTLY
Status: DISCONTINUED | OUTPATIENT
Start: 2017-10-14 | End: 2017-10-16 | Stop reason: HOSPADM

## 2017-10-14 RX ORDER — SODIUM CHLORIDE 9 MG/ML
INJECTION, SOLUTION INTRAVENOUS CONTINUOUS
Status: DISCONTINUED | OUTPATIENT
Start: 2017-10-14 | End: 2017-10-15

## 2017-10-14 RX ORDER — CLONIDINE HYDROCHLORIDE 0.1 MG/1
0.1 TABLET ORAL 2 TIMES DAILY
Status: DISCONTINUED | OUTPATIENT
Start: 2017-10-14 | End: 2017-10-16 | Stop reason: HOSPADM

## 2017-10-14 RX ORDER — AMLODIPINE BESYLATE 5 MG/1
10 TABLET ORAL DAILY
Status: DISCONTINUED | OUTPATIENT
Start: 2017-10-15 | End: 2017-10-16 | Stop reason: HOSPADM

## 2017-10-14 RX ORDER — METHYLPREDNISOLONE SOD SUCC 125 MG
125 VIAL (EA) INJECTION EVERY 12 HOURS
Status: DISCONTINUED | OUTPATIENT
Start: 2017-10-15 | End: 2017-10-15

## 2017-10-14 RX ORDER — ASPIRIN 81 MG/1
81 TABLET ORAL DAILY
Status: DISCONTINUED | OUTPATIENT
Start: 2017-10-15 | End: 2017-10-16 | Stop reason: HOSPADM

## 2017-10-14 RX ORDER — IPRATROPIUM BROMIDE AND ALBUTEROL SULFATE 2.5; .5 MG/3ML; MG/3ML
3 SOLUTION RESPIRATORY (INHALATION) EVERY 4 HOURS PRN
Status: DISCONTINUED | OUTPATIENT
Start: 2017-10-14 | End: 2017-10-16 | Stop reason: HOSPADM

## 2017-10-14 RX ORDER — SODIUM CHLORIDE AND POTASSIUM CHLORIDE 150; 900 MG/100ML; MG/100ML
INJECTION, SOLUTION INTRAVENOUS CONTINUOUS
Status: DISCONTINUED | OUTPATIENT
Start: 2017-10-14 | End: 2017-10-14

## 2017-10-14 RX ORDER — METHYLPREDNISOLONE SOD SUCC 125 MG
125 VIAL (EA) INJECTION
Status: COMPLETED | OUTPATIENT
Start: 2017-10-14 | End: 2017-10-14

## 2017-10-14 RX ORDER — LEVOTHYROXINE SODIUM 100 UG/1
100 TABLET ORAL
Status: DISCONTINUED | OUTPATIENT
Start: 2017-10-15 | End: 2017-10-16 | Stop reason: HOSPADM

## 2017-10-14 RX ADMIN — IPRATROPIUM BROMIDE AND ALBUTEROL SULFATE 3 ML: .5; 3 SOLUTION RESPIRATORY (INHALATION) at 05:10

## 2017-10-14 RX ADMIN — SIMVASTATIN 40 MG: 40 TABLET, FILM COATED ORAL at 09:10

## 2017-10-14 RX ADMIN — IPRATROPIUM BROMIDE 0.5 MG: 0.5 SOLUTION RESPIRATORY (INHALATION) at 05:10

## 2017-10-14 RX ADMIN — ALBUTEROL SULFATE 10 MG: 2.5 SOLUTION RESPIRATORY (INHALATION) at 05:10

## 2017-10-14 RX ADMIN — METHYLPREDNISOLONE SODIUM SUCCINATE 125 MG: 125 INJECTION, POWDER, FOR SOLUTION INTRAMUSCULAR; INTRAVENOUS at 05:10

## 2017-10-14 NOTE — ED PROVIDER NOTES
Encounter Date: 10/14/2017    SCRIBE #1 NOTE: I, Yulia Niño, am scribing for, and in the presence of, Dr. Dudley.       History     Chief Complaint   Patient presents with    Shortness of Breath       10/14/2017 4:58 PM     Chief complaint: SOB      Blaire Cage is a 87 y.o. female who presents to the ED via EMS with an onset of SOB with associated productive cough, shortly PTA. Pt reports she was at a  and had not been able to use her albuterol treatments today. She is not on O2 at home. Denies swelling, bloody stools, leg pain, recent use of steroid medication. PMHx includes COPD, HTN, HLD, Type 2 diabetes mellitus, left ventricular diastolic dysfunction, and prior PNA of lower lobe secondary to infectious organism. No pertinent surgical history.      The history is provided by the EMS personnel and the patient.     Review of patient's allergies indicates:   Allergen Reactions    Carvedilol Other (See Comments)     Bradycardia and syncope    Boniva [ibandronate]     Codeine     Hydralazine analogues     Iodinated contrast- oral and iv dye Hives    Lisinopril     Morphine      Past Medical History:   Diagnosis Date    Anticoagulant long-term use     aspirin    COPD (chronic obstructive pulmonary disease)     COPD with acute exacerbation 2015    Diabetes mellitus type II     Encounter for blood transfusion     Hip arthritis 3/1/2016    Hyperlipidemia     Hypertension     Pneumonia of right lower lobe due to infectious organism 2017    Thyroid disease     hypothyroid     Past Surgical History:   Procedure Laterality Date    APPENDECTOMY      FRACTURE SURGERY      right hip     HERNIA REPAIR      groin    HYSTERECTOMY      VARICOSE VEIN SURGERY       Family History   Problem Relation Age of Onset    Hypertension Mother     Diabetes Sister     Diabetes Brother     Kidney disease Son      Social History   Substance Use Topics    Smoking status: Former Smoker      Years: 44.00     Types: Cigarettes     Quit date: 4/30/2015    Smokeless tobacco: Never Used      Comment: 1/08/2015    Alcohol use No     Review of Systems   Constitutional: Negative for fever.   HENT: Negative for sore throat.    Respiratory: Positive for cough (Productive.) and shortness of breath.    Cardiovascular: Negative for chest pain and leg swelling.   Gastrointestinal: Negative for blood in stool and nausea.   Genitourinary: Negative for dysuria.   Musculoskeletal: Negative for back pain and myalgias (Leg Pain.).   Skin: Negative for rash.   Neurological: Negative for weakness.   Hematological: Does not bruise/bleed easily.       Physical Exam     Initial Vitals   BP Pulse Resp Temp SpO2   -- -- -- -- --      MAP       --         Vitals:    10/14/17 1801   BP: (!) 144/66   Pulse: 64   Resp: 20   Temp:        Physical Exam    Nursing note and vitals reviewed.  Constitutional: She appears well-developed and well-nourished. She is not diaphoretic. No distress.   HENT:   Head: Normocephalic and atraumatic.   Mouth/Throat: Oropharynx is clear and moist.   Eyes: Conjunctivae are normal.   Neck: Neck supple. No JVD present.   Cardiovascular: Normal rate, regular rhythm, normal heart sounds and intact distal pulses. Exam reveals no gallop and no friction rub.    No murmur heard.  Pulmonary/Chest: No accessory muscle usage. Tachypnea noted. She has decreased breath sounds. She has wheezes. She has rhonchi. She has no rales.   Bilateral expiratory wheezes with distant breath sounds. Scattered rhonchi. No rales.    Abdominal: Soft. She exhibits no distension. There is no tenderness. There is no rebound.   Musculoskeletal: Normal range of motion.   Legs symmetric and nontender.   Neurological: She is alert and oriented to person, place, and time. No cranial nerve deficit or sensory deficit.   Speaking in complete sentences.   Skin: No rash noted. No erythema.   Psychiatric: She has a normal mood and affect. Her  speech is normal and behavior is normal. Judgment and thought content normal.         ED Course   Procedures  Labs Reviewed   CBC W/ AUTO DIFFERENTIAL - Abnormal; Notable for the following:        Result Value    RBC 3.33 (*)     Hemoglobin 10.9 (*)     Hematocrit 31.9 (*)     MCH 32.6 (*)     MPV 7.9 (*)     Eos # 0.7 (*)     Eosinophil% 8.4 (*)     All other components within normal limits   BASIC METABOLIC PANEL - Abnormal; Notable for the following:     Sodium 132 (*)     Potassium 5.5 (*)     Glucose 116 (*)     BUN, Bld 36 (*)     Creatinine 1.8 (*)     eGFR if  29 (*)     eGFR if non  25 (*)     All other components within normal limits   TROPONIN I         Imaging Results          X-Ray Chest 1 View (Final result)  Result time 10/14/17 17:13:40    Final result by Harjeet Ziegler MD (10/14/17 17:13:40)                 Impression:     Little change from prior exam. Continued minimal consolidation right lung base.      Electronically signed by: HARJEET ZIEGLER MD  Date:     10/14/17  Time:    17:13              Narrative:    Portable AP view of the chest was obtained.  Comparison -- 9/11/17. The heart size is borderline enlarged. Mediastinum shows aortic atherosclerosis. The lungs are well expanded with eventrations noted along the diaphragms. Lungs are clear except minimal, patchy opacity at the right lower zone similar to before. This could represent atelectasis or airspace disease such as pneumonia or aspiration. Continued followup is recommended. No pleural fluid is seen. Clips are present at the right axilla.                                   Medical Decision Making:   History:   Old Medical Records: I decided to obtain old medical records.  Clinical Tests:   Lab Tests: Reviewed and Ordered  Radiological Study: Ordered and Reviewed  Medical Tests: Reviewed and Ordered            Scribe Attestation:   Scribe #1: I performed the above scribed service and the  documentation accurately describes the services I performed. I attest to the accuracy of the note.    I, Dr. Harpreet Dudley, personally performed the services described in this documentation. All medical record entries made by the scribe were at my direction and in my presence.  I have reviewed the chart and agree that the record reflects my personal performance and is accurate and complete. Harpreet Dudley MD.  10:58 PM 10/14/2017    Blaire Cage is a 87 y.o. female presenting with copd exacerbation responding very well to treatment in the emergency department.  Patient was given IV site Medrol with continuous nebulizer treatment with albuterol and ipratropium with marked improvement in dyspnea, tachypnea, and wheezing.  She is noted to be hyperkalemic, and issue she states has happened in the past.  We will hold ACE inhibitor with potassium restricted diet and IV fluids to reassess closely.  There are no EKG changes associated with hyperkalemia.  I do not think intracellular shifting or IV calcium is indicated at this point.  I have very low suspicion of other causes of dyspnea such as ACS, CHF, PE.  I do not think further CT angiography is indicated.  I have spoken with hospitalist service who will assume care.        ED Course as of Oct 14 1849   Sat Oct 14, 2017   1704 EKG:  [MR]   1706 EKG:  Sinus rhythm with primary AV block, KY interval 262 ms, other intervals normal.  Normal axis.  There are no acute ST or T wave changes suggestive of acute ischemia or infarction.  [MR]   1711 CXR:  NAD compared to prior. (my read)  [MR]      ED Course User Index  [MR] Harpreet Dudley MD     Clinical Impression:     1. COPD with acute exacerbation    2. COPD (chronic obstructive pulmonary disease)    3. Hyperkalemia          Disposition:   Disposition: Admitted                        Harpreet Dudley MD  10/14/17 [id number]

## 2017-10-15 PROBLEM — E87.5 HYPERKALEMIA: Status: ACTIVE | Noted: 2017-10-15

## 2017-10-15 PROBLEM — J45.901 ASTHMATIC BRONCHITIS WITH ACUTE EXACERBATION: Status: ACTIVE | Noted: 2017-10-15

## 2017-10-15 LAB
POCT GLUCOSE: 159 MG/DL (ref 70–110)
POCT GLUCOSE: 183 MG/DL (ref 70–110)
POCT GLUCOSE: 187 MG/DL (ref 70–110)

## 2017-10-15 PROCEDURE — 96360 HYDRATION IV INFUSION INIT: CPT

## 2017-10-15 PROCEDURE — 25000242 PHARM REV CODE 250 ALT 637 W/ HCPCS: Performed by: NURSE PRACTITIONER

## 2017-10-15 PROCEDURE — 63600175 PHARM REV CODE 636 W HCPCS: Performed by: NURSE PRACTITIONER

## 2017-10-15 PROCEDURE — 25000242 PHARM REV CODE 250 ALT 637 W/ HCPCS: Performed by: EMERGENCY MEDICINE

## 2017-10-15 PROCEDURE — 25000003 PHARM REV CODE 250: Performed by: EMERGENCY MEDICINE

## 2017-10-15 PROCEDURE — 25000003 PHARM REV CODE 250: Performed by: NURSE PRACTITIONER

## 2017-10-15 PROCEDURE — 96375 TX/PRO/DX INJ NEW DRUG ADDON: CPT

## 2017-10-15 PROCEDURE — 94640 AIRWAY INHALATION TREATMENT: CPT

## 2017-10-15 PROCEDURE — 27000221 HC OXYGEN, UP TO 24 HOURS

## 2017-10-15 PROCEDURE — G0378 HOSPITAL OBSERVATION PER HR: HCPCS

## 2017-10-15 PROCEDURE — 96361 HYDRATE IV INFUSION ADD-ON: CPT

## 2017-10-15 PROCEDURE — 96367 TX/PROPH/DG ADDL SEQ IV INF: CPT

## 2017-10-15 PROCEDURE — 96366 THER/PROPH/DIAG IV INF ADDON: CPT

## 2017-10-15 PROCEDURE — 99900035 HC TECH TIME PER 15 MIN (STAT)

## 2017-10-15 PROCEDURE — 96376 TX/PRO/DX INJ SAME DRUG ADON: CPT

## 2017-10-15 PROCEDURE — 94761 N-INVAS EAR/PLS OXIMETRY MLT: CPT

## 2017-10-15 PROCEDURE — 82962 GLUCOSE BLOOD TEST: CPT

## 2017-10-15 RX ORDER — GABAPENTIN 300 MG/1
300 CAPSULE ORAL NIGHTLY
Status: DISCONTINUED | OUTPATIENT
Start: 2017-10-15 | End: 2017-10-16 | Stop reason: HOSPADM

## 2017-10-15 RX ORDER — GLUCAGON 1 MG
1 KIT INJECTION
Status: DISCONTINUED | OUTPATIENT
Start: 2017-10-15 | End: 2017-10-16 | Stop reason: HOSPADM

## 2017-10-15 RX ORDER — LACTULOSE 10 G/15ML
30 SOLUTION ORAL ONCE
Status: DISCONTINUED | OUTPATIENT
Start: 2017-10-15 | End: 2017-10-16 | Stop reason: HOSPADM

## 2017-10-15 RX ORDER — IBUPROFEN 200 MG
24 TABLET ORAL
Status: DISCONTINUED | OUTPATIENT
Start: 2017-10-15 | End: 2017-10-16 | Stop reason: HOSPADM

## 2017-10-15 RX ORDER — LOSARTAN POTASSIUM 25 MG/1
100 TABLET ORAL DAILY
Status: DISCONTINUED | OUTPATIENT
Start: 2017-10-15 | End: 2017-10-16 | Stop reason: HOSPADM

## 2017-10-15 RX ORDER — INSULIN ASPART 100 [IU]/ML
0-5 INJECTION, SOLUTION INTRAVENOUS; SUBCUTANEOUS
Status: DISCONTINUED | OUTPATIENT
Start: 2017-10-15 | End: 2017-10-16 | Stop reason: HOSPADM

## 2017-10-15 RX ORDER — SODIUM CHLORIDE 9 MG/ML
INJECTION, SOLUTION INTRAVENOUS ONCE
Status: DISCONTINUED | OUTPATIENT
Start: 2017-10-15 | End: 2017-10-16 | Stop reason: HOSPADM

## 2017-10-15 RX ORDER — IBUPROFEN 200 MG
16 TABLET ORAL
Status: DISCONTINUED | OUTPATIENT
Start: 2017-10-15 | End: 2017-10-16 | Stop reason: HOSPADM

## 2017-10-15 RX ORDER — FERROUS SULFATE 325(65) MG
325 TABLET, DELAYED RELEASE (ENTERIC COATED) ORAL DAILY
Status: DISCONTINUED | OUTPATIENT
Start: 2017-10-15 | End: 2017-10-16 | Stop reason: HOSPADM

## 2017-10-15 RX ORDER — FLUTICASONE PROPIONATE 50 MCG
1 SPRAY, SUSPENSION (ML) NASAL DAILY
Status: DISCONTINUED | OUTPATIENT
Start: 2017-10-15 | End: 2017-10-16 | Stop reason: HOSPADM

## 2017-10-15 RX ORDER — METHYLPREDNISOLONE SOD SUCC 125 MG
40 VIAL (EA) INJECTION EVERY 8 HOURS
Status: DISCONTINUED | OUTPATIENT
Start: 2017-10-15 | End: 2017-10-16 | Stop reason: HOSPADM

## 2017-10-15 RX ADMIN — GABAPENTIN 300 MG: 300 CAPSULE ORAL at 07:10

## 2017-10-15 RX ADMIN — AZITHROMYCIN MONOHYDRATE 500 MG: 500 INJECTION, POWDER, LYOPHILIZED, FOR SOLUTION INTRAVENOUS at 01:10

## 2017-10-15 RX ADMIN — AMLODIPINE BESYLATE 10 MG: 5 TABLET ORAL at 05:10

## 2017-10-15 RX ADMIN — FLUTICASONE PROPIONATE 1 SPRAY: 50 SPRAY, METERED NASAL at 12:10

## 2017-10-15 RX ADMIN — IPRATROPIUM BROMIDE AND ALBUTEROL SULFATE 3 ML: .5; 3 SOLUTION RESPIRATORY (INHALATION) at 08:10

## 2017-10-15 RX ADMIN — LOSARTAN POTASSIUM 100 MG: 25 TABLET, FILM COATED ORAL at 11:10

## 2017-10-15 RX ADMIN — ASPIRIN 81 MG: 81 TABLET, COATED ORAL at 09:10

## 2017-10-15 RX ADMIN — CLONIDINE HYDROCHLORIDE 0.1 MG: 0.1 TABLET ORAL at 07:10

## 2017-10-15 RX ADMIN — FERROUS SULFATE TAB EC 325 MG (65 MG FE EQUIVALENT) 325 MG: 325 (65 FE) TABLET DELAYED RESPONSE at 11:10

## 2017-10-15 RX ADMIN — LEVOTHYROXINE SODIUM 100 MCG: 100 TABLET ORAL at 05:10

## 2017-10-15 RX ADMIN — METHYLPREDNISOLONE SODIUM SUCCINATE 40 MG: 125 INJECTION, POWDER, FOR SOLUTION INTRAMUSCULAR; INTRAVENOUS at 03:10

## 2017-10-15 RX ADMIN — METHYLPREDNISOLONE SODIUM SUCCINATE 40 MG: 125 INJECTION, POWDER, FOR SOLUTION INTRAMUSCULAR; INTRAVENOUS at 10:10

## 2017-10-15 RX ADMIN — SODIUM CHLORIDE: 0.9 INJECTION, SOLUTION INTRAVENOUS at 12:10

## 2017-10-15 RX ADMIN — SIMVASTATIN 40 MG: 40 TABLET, FILM COATED ORAL at 07:10

## 2017-10-15 NOTE — PROGRESS NOTES
[facility] Hospital Medicine  Progress Note    Patient Name: Blaire Cage  MRN: [id number]  Patient Class: OP- Observation   Admission Date: 10/14/2017  Length of Stay: 0 days  Attending Physician: Aruna Freitas MD  Primary Care Provider: Eli Edmonds MD        Subjective:     Principal Problem:COPD with acute exacerbation    HPI:  Ms. Cage is an 86yo F with a PMH of DM2, HTN, Hypothyroid, COPD, Chronic Diastolic HF, and CKD3. She was admitted from 9/11/17-9/14/17 for pneumonia. She presents to the ED today via EMS with c/o dyspnea. While in the ED, she had a CXR which was unremarkable. She was given IV steroid and nebulizers. She was found to be hyperkalemic and IVF infusion was initiated. She currently denies pain, SOB, or other discomforts.    Hospital Course:  No notes on file    Interval History: patient with persistent dyspnea, increases with exertion. On 4 L nasal cannula at 98%.  + cough    Review of Systems   Constitutional: Positive for appetite change and fatigue. Negative for diaphoresis and fever.   Respiratory: Positive for cough, shortness of breath and wheezing.    Cardiovascular: Negative for chest pain and leg swelling.   Gastrointestinal: Negative for abdominal distention and abdominal pain.   Skin: Negative for rash.   Neurological: Negative for speech difficulty and numbness.   Psychiatric/Behavioral: Negative for confusion. The patient is nervous/anxious.      Objective:     Vital Signs (Most Recent):  Temp: 97.3 °F (36.3 °C) (10/15/17 1300)  Pulse: 74 (10/15/17 1300)  Resp: 18 (10/15/17 1300)  BP: (!) 141/68 (10/15/17 1300)  SpO2: 98 % (10/15/17 1300) Vital Signs (24h Range):  Temp:  [97.3 °F (36.3 °C)-97.9 °F (36.6 °C)] 97.3 °F (36.3 °C)  Pulse:  [63-80] 74  Resp:  [18-30] 18  SpO2:  [93 %-100 %] 98 %  BP: (108-221)/(50-89) 141/68     Weight: 60.8 kg (134 lb)  Body mass index is 23 kg/m².    Intake/Output Summary (Last 24 hours) at 10/15/17 6327  Last data filed  at 10/15/17 0600   Gross per 24 hour   Intake          1076.25 ml   Output              300 ml   Net           776.25 ml      Physical Exam   Constitutional: She is oriented to person, place, and time. She appears well-developed and well-nourished.   HENT:   Head: Normocephalic and atraumatic.   Mouth/Throat: Oropharynx is clear and moist.   Eyes: Conjunctivae and EOM are normal. Pupils are equal, round, and reactive to light.   Neck: Normal range of motion. Neck supple.   Cardiovascular: Normal rate, regular rhythm and intact distal pulses.    Murmur (ASM) heard.  Pulmonary/Chest: Effort normal. She has wheezes.   Abdominal: Soft. Bowel sounds are normal. There is no tenderness. There is no guarding.   Musculoskeletal: Normal range of motion.   Neurological: She is alert and oriented to person, place, and time.   Skin: Skin is warm and dry.   Psychiatric: She has a normal mood and affect. Her behavior is normal. Judgment normal.   Nursing note and vitals reviewed.      Significant Labs:   BMP:   Recent Labs  Lab 10/14/17  1711   *   *   K 5.5*   CL 97   CO2 24   BUN 36*   CREATININE 1.8*   CALCIUM 9.7     CBC:   Recent Labs  Lab 10/14/17  1711   WBC 8.20   HGB 10.9*   HCT 31.9*          Significant Imaging: I have reviewed and interpreted all pertinent imaging results/findings within the past 24 hours.    Assessment/Plan:      * COPD with acute exacerbation    Azithromycin  Nebulizers/O2 therapy  IV Solumedrol  Home oxygen eval prior to dC          Hyperkalemia    IVF Hydration  Monitor labs  Renal diet  Lactulose x 1 dose            Asthmatic bronchitis with acute exacerbation    Azithromycin  Nebulizers/O2 therapy  IV Solumedrol          Left ventricular diastolic dysfunction with preserved systolic function    Continue chronic medications            Type 2 diabetes mellitus with stage 3 chronic kidney disease    Controlled. Not on any chronic medications.  Monitor blood sugars QAC&HS  SSi  prn  Diabetic diet          Hypertension associated with diabetes    Chronic/Controlled. Continue chronic medications adjusting as needed  Resume ARB        Hypothyroid    Continue home levothyroxine.          CKD (chronic kidney disease), stage III, eGFR 39, progressive 31    Stable. Monitor labs  Renal dose medications  Avoid NSAIDs/Nephrotoxic agents  Renal diet            VTE Risk Mitigation         Ordered     Place JIMMY hose  Until discontinued      10/15/17 0043     High Risk of VTE  Once      10/15/17 0043     Place sequential compression device  Until discontinued      10/15/17 0043        Discussed POC with patient. Still of 4 L NC and not currently on home oxygen. Will reevaluate in am.       Sadie Saeed NP  Department of Hospital Medicine   [facility]

## 2017-10-15 NOTE — ASSESSMENT & PLAN NOTE
Controlled. Not on any chronic medications.  Monitor blood sugars QAC&HS  SSi prn  Diabetic diet

## 2017-10-15 NOTE — PLAN OF CARE
met with patient in her room in order to complete assessment.  Pt reported that she lives alone; however, she has a supportive family.  Her daughters provide her with transportation to appointments.  She stated that she is having some issues with paying her medical copayments.   provided patient with an [facility] Assistance [facility] Application and explained the program to her.  Pt denied any additional discharge needs at this time.         10/15/17 1012   Discharge Assessment   Assessment Type Discharge Planning Assessment   Confirmed/corrected address and phone number on facesheet? Yes   Assessment information obtained from? Patient   Communicated expected length of stay with patient/caregiver yes   Prior to hospitilization cognitive status: Alert/Oriented   Prior to hospitalization functional status: Independent   Current cognitive status: Alert/Oriented   Current Functional Status: Independent   Lives With alone   Able to Return to Prior Arrangements yes   Is patient able to care for self after discharge? Yes   Patient's perception of discharge disposition home or selfcare   Readmission Within The Last 30 Days no previous admission in last 30 days   Patient currently being followed by outpatient case management? No   Patient currently receives any other outside agency services? No   Equipment Currently Used at Home nebulizer   Do you have any problems affording any of your prescribed medications? No   Is the patient taking medications as prescribed? yes   Does the patient have transportation home? Yes   Transportation Available family or friend will provide   Does the patient receive services at the [facility]? No   Discharge Plan A Home   Patient/Family In Agreement With Plan yes   Does the patient have transportation to healthcare appointments? Yes

## 2017-10-15 NOTE — SUBJECTIVE & OBJECTIVE
Interval History: patient with persistent dyspnea, increases with exertion. On 4 L nasal cannula at 98%.  + cough    Review of Systems   Constitutional: Positive for appetite change and fatigue. Negative for diaphoresis and fever.   Respiratory: Positive for cough, shortness of breath and wheezing.    Cardiovascular: Negative for chest pain and leg swelling.   Gastrointestinal: Negative for abdominal distention and abdominal pain.   Skin: Negative for rash.   Neurological: Negative for speech difficulty and numbness.   Psychiatric/Behavioral: Negative for confusion. The patient is nervous/anxious.      Objective:     Vital Signs (Most Recent):  Temp: 97.3 °F (36.3 °C) (10/15/17 1300)  Pulse: 74 (10/15/17 1300)  Resp: 18 (10/15/17 1300)  BP: (!) 141/68 (10/15/17 1300)  SpO2: 98 % (10/15/17 1300) Vital Signs (24h Range):  Temp:  [97.3 °F (36.3 °C)-97.9 °F (36.6 °C)] 97.3 °F (36.3 °C)  Pulse:  [63-80] 74  Resp:  [18-30] 18  SpO2:  [93 %-100 %] 98 %  BP: (108-221)/(50-89) 141/68     Weight: 60.8 kg (134 lb)  Body mass index is 23 kg/m².    Intake/Output Summary (Last 24 hours) at 10/15/17 1543  Last data filed at 10/15/17 0600   Gross per 24 hour   Intake          1076.25 ml   Output              300 ml   Net           776.25 ml      Physical Exam   Constitutional: She is oriented to person, place, and time. She appears well-developed and well-nourished.   HENT:   Head: Normocephalic and atraumatic.   Mouth/Throat: Oropharynx is clear and moist.   Eyes: Conjunctivae and EOM are normal. Pupils are equal, round, and reactive to light.   Neck: Normal range of motion. Neck supple.   Cardiovascular: Normal rate, regular rhythm and intact distal pulses.    Murmur (ASM) heard.  Pulmonary/Chest: Effort normal. She has wheezes.   Abdominal: Soft. Bowel sounds are normal. There is no tenderness. There is no guarding.   Musculoskeletal: Normal range of motion.   Neurological: She is alert and oriented to person, place, and time.    Skin: Skin is warm and dry.   Psychiatric: She has a normal mood and affect. Her behavior is normal. Judgment normal.   Nursing note and vitals reviewed.      Significant Labs:   BMP:   Recent Labs  Lab 10/14/17  1711   *   *   K 5.5*   CL 97   CO2 24   BUN 36*   CREATININE 1.8*   CALCIUM 9.7     CBC:   Recent Labs  Lab 10/14/17  1711   WBC 8.20   HGB 10.9*   HCT 31.9*          Significant Imaging: I have reviewed and interpreted all pertinent imaging results/findings within the past 24 hours.

## 2017-10-15 NOTE — ASSESSMENT & PLAN NOTE
Chronic/Controlled. Continue chronic medications adjusting as needed  Stop ARB due to hyperkalemia

## 2017-10-15 NOTE — NURSING
Alert and oriented. Denies pain/discomfort. Denies shortness of breath. Oxygen to face as ordered. Breath sounds clear and diminished. Discussed plan of care. Patient tolerates pureed diet to prevent aspiration. Assured that meals will be accommodated. Will continue to monitor. Discussed safety precautions.

## 2017-10-15 NOTE — HPI
Ms. Cage is an 86yo F with a PMH of DM2, HTN, Hypothyroid, COPD, Chronic Diastolic HF, and CKD3. She was admitted from 9/11/17-9/14/17 for pneumonia. She presents to the ED today via EMS with c/o dyspnea. While in the ED, she had a CXR which was unremarkable. She was given IV steroid and nebulizers. She was found to be hyperkalemic and IVF infusion was initiated. She currently denies pain, SOB, or other discomforts.

## 2017-10-15 NOTE — H&P
[facility] Hospital Medicine  History & Physical    Patient Name: Blaire Cage  MRN: [id number]  Admission Date: 10/14/2017  Attending Physician: Aruna Freitas MD   Primary Care Provider: Eli Edmonds MD         Patient information was obtained from patient and ER records.     Subjective:     Principal Problem:COPD with acute exacerbation    Chief Complaint:   Chief Complaint   Patient presents with    Shortness of Breath        HPI: Ms. Cage is an 86yo F with a PMH of DM2, HTN, Hypothyroid, COPD, Chronic Diastolic HF, and CKD3. She was admitted from 9/11/17-9/14/17 for pneumonia. She presents to the ED today via EMS with c/o dyspnea. While in the ED, she had a CXR which was unremarkable. She was given IV steroid and nebulizers. She was found to be hyperkalemic and IVF infusion was initiated. She currently denies pain, SOB, or other discomforts.    Past Medical History:   Diagnosis Date    Anticoagulant long-term use     aspirin    COPD (chronic obstructive pulmonary disease)     COPD with acute exacerbation 1/9/2015    Diabetes mellitus type II     Encounter for blood transfusion     Hip arthritis 3/1/2016    Hyperlipidemia     Hypertension     Pneumonia of right lower lobe due to infectious organism 9/11/2017    Thyroid disease     hypothyroid       Past Surgical History:   Procedure Laterality Date    APPENDECTOMY      FRACTURE SURGERY      right hip     HERNIA REPAIR      groin    HYSTERECTOMY      VARICOSE VEIN SURGERY         Review of patient's allergies indicates:   Allergen Reactions    Carvedilol Other (See Comments)     Bradycardia and syncope    Boniva [ibandronate]     Codeine     Hydralazine analogues     Iodinated contrast- oral and iv dye Hives    Lisinopril     Morphine        No current facility-administered medications on file prior to encounter.      Current Outpatient Prescriptions on File Prior to Encounter   Medication Sig     albuterol-ipratropium 2.5mg-0.5mg/3mL (DUO-NEB) 0.5 mg-3 mg(2.5 mg base)/3 mL nebulizer solution Take 3 mLs by nebulization every 6 (six) hours while awake. Rescue    amlodipine (NORVASC) 10 MG tablet Take 1 tablet (10 mg total) by mouth once daily.    ascorbic acid (VITAMIN C) 500 MG tablet Take 500 mg by mouth once daily.      aspirin (ECOTRIN) 81 MG EC tablet Take 81 mg by mouth once daily.      calcium carbonate (OS-TASIA) 500 mg calcium (1,250 mg) tablet Take 1 tablet by mouth 2 (two) times daily.      ferrous sulfate 325 mg (65 mg iron) Tab tablet Take 1 tablet (325 mg total) by mouth daily with breakfast.    fish oil-omega-3 fatty acids 300-1,000 mg capsule Take 2 g by mouth every evening.     fluticasone (FLONASE) 50 mcg/actuation nasal spray 1 spray by Each Nare route once daily. (Patient taking differently: 1 spray by Each Nare route daily as needed for Rhinitis or Allergies. )    gabapentin (NEURONTIN) 300 MG capsule Take 1 capsule (300 mg total) by mouth every evening.    guanfacine (TENEX) 1 MG Tab Take 1 mg by mouth every evening. Take 1 tablet by mouth in the evening    levothyroxine (SYNTHROID) 100 MCG tablet Take 1 tablet (100 mcg total) by mouth before breakfast.    losartan (COZAAR) 100 MG tablet Take 1 tablet (100 mg total) by mouth once daily.    magnesium oxide (MAG-OX) 400 mg tablet take by mouth once daily    nitroGLYCERIN (NITROSTAT) 0.4 MG SL tablet Place 1 tablet (0.4 mg total) under the tongue every 5 (five) minutes as needed for Chest pain. As needed    simvastatin (ZOCOR) 40 MG tablet TAKE ONE TABLET BY MOUTH ONCE DAILY IN THE EVENING    vitamin D 1000 units Tab Take 1 tablet (1,000 Units total) by mouth once daily.    cloNIDine (CATAPRES) 0.1 MG tablet Take 1 tablet (0.1 mg total) by mouth 2 (two) times daily. Take one tablet by mouth every hour as needed for systolic blood pressure greater than 180.  Do not take more than 3 tablets in 24 hours.     Family History      Problem Relation (Age of Onset)    Diabetes Sister, Brother    Hypertension Mother    Kidney disease Son        Social History Main Topics    Smoking status: Former Smoker     Years: 44.00     Types: Cigarettes     Quit date: 4/30/2015    Smokeless tobacco: Never Used      Comment: 1/08/2015    Alcohol use No    Drug use: No    Sexual activity: No     Review of Systems   Constitutional: Negative for chills and fever.   HENT: Negative for congestion and postnasal drip.    Eyes: Negative for visual disturbance.   Respiratory: Positive for cough and shortness of breath.    Cardiovascular: Negative for chest pain and palpitations.   Gastrointestinal: Negative for diarrhea, nausea and vomiting.   Genitourinary: Negative for dysuria.   Musculoskeletal: Negative for arthralgias.   Skin: Negative for wound.   Neurological: Negative for dizziness, syncope and headaches.   Psychiatric/Behavioral: Negative for confusion and hallucinations.     Objective:     Vital Signs (Most Recent):  Temp: 97.6 °F (36.4 °C) (10/14/17 2037)  Pulse: 72 (10/14/17 2046)  Resp: (!) 21 (10/14/17 2037)  BP: (!) 165/70 (10/14/17 2037)  SpO2: 97 % (10/14/17 2105) Vital Signs (24h Range):  Temp:  [97.6 °F (36.4 °C)-97.8 °F (36.6 °C)] 97.6 °F (36.4 °C)  Pulse:  [63-80] 72  Resp:  [18-30] 21  SpO2:  [95 %-100 %] 97 %  BP: (143-221)/(66-89) 165/70     Weight: 60.8 kg (134 lb)  Body mass index is 23 kg/m².    Physical Exam   Constitutional: She is oriented to person, place, and time. No distress.   HENT:   Head: Normocephalic.   Eyes: Pupils are equal, round, and reactive to light.   Neck: Normal range of motion. Neck supple. No JVD present. No tracheal deviation present.   Cardiovascular: Normal rate, regular rhythm, normal heart sounds and intact distal pulses.    Pulmonary/Chest: Effort normal. No respiratory distress.   Mild rhonchi bilaterally. Diminished at the bases.   Abdominal: Soft. Bowel sounds are normal. She exhibits no distension. There  is no tenderness.   Musculoskeletal: Normal range of motion. She exhibits no edema.   Generalized weakness   Neurological: She is alert and oriented to person, place, and time. No cranial nerve deficit.   Skin: Skin is warm and dry. Capillary refill takes less than 2 seconds.   Psychiatric: She has a normal mood and affect. Her behavior is normal. Judgment and thought content normal.        Significant Labs:   BMP:   Recent Labs  Lab 10/14/17  1711   *   *   K 5.5*   CL 97   CO2 24   BUN 36*   CREATININE 1.8*   CALCIUM 9.7     CBC:   Recent Labs  Lab 10/14/17  1711   WBC 8.20   HGB 10.9*   HCT 31.9*        Troponin:   Recent Labs  Lab 10/14/17  1711   TROPONINI <0.006       Significant Imaging:     CXR: Reviewed film, looks ok. Reviewed radiologist's report-- Impression    Little change from prior exam. Continued minimal consolidation right lung base.         Assessment/Plan:     * COPD with acute exacerbation    Azithromycin  Nebulizers/O2 therapy  IV Solumedrol          Asthmatic bronchitis with acute exacerbation    Azithromycin  Nebulizers/O2 therapy  IV Solumedrol          Hyperkalemia    Hold ARB since it can contribute to hyperkalemia  IVF Hydration  Albuterol nebulizers should help to improve K level  Monitor labs           Left ventricular diastolic dysfunction with preserved systolic function    Continue chronic medications  Stop ARB due to hyperkalemia          Type 2 diabetes mellitus with stage 3 chronic kidney disease    Controlled. Not on any chronic medications.  Monitor blood sugars QAC&HS  SSi prn  Diabetic diet          Hypertension associated with diabetes    Chronic/Controlled. Continue chronic medications adjusting as needed  Stop ARB due to hyperkalemia          Hypothyroid    Continue home levothyroxine.          CKD (chronic kidney disease), stage III, eGFR 39, progressive 31    Stable. Monitor labs  Renal dose medications  Avoid NSAIDs/Nephrotoxic agents  Renal diet             VTE Risk Mitigation         Ordered     Place JIMMY hose  Until discontinued      10/15/17 0043     High Risk of VTE  Once      10/15/17 0043     Place sequential compression device  Until discontinued      10/15/17 0043             Erika Maldonado NP  Department of Hospital Medicine   [facility]

## 2017-10-15 NOTE — PLAN OF CARE
Problem: Patient Care Overview  Goal: Plan of Care Review  Outcome: Ongoing (interventions implemented as appropriate)  Alert/oriented  Denies pain/discomfort/shortness of breath  Supplemental oxygen at 4L. O2 saturation at 98%  Tolerating IVFs without problems  Will continue to monitor lab   Educated patient on s/s of increased potassium  Sinus rhythm with PAC's  Clear yellow urine   Patient anticipating discharge home today  Safe

## 2017-10-15 NOTE — PLAN OF CARE
Problem: Patient Care Overview  Goal: Plan of Care Review  Outcome: Ongoing (interventions implemented as appropriate)  Pt on 4L NC with Q4 PRN duoneb, no treatment needed at this time

## 2017-10-15 NOTE — PLAN OF CARE
10/15/17 1015   GOLDEN Message   Medicare Outpatient and Observation Notification regarding financial responsibility Given to patient/caregiver   Date GOLDEN was signed 10/15/17   Time GOLDEN was signed [id number]

## 2017-10-16 ENCOUNTER — TELEPHONE (OUTPATIENT)
Dept: FAMILY MEDICINE | Facility: CLINIC | Age: 82
End: 2017-10-16

## 2017-10-16 VITALS
DIASTOLIC BLOOD PRESSURE: 95 MMHG | HEART RATE: 68 BPM | WEIGHT: 134 LBS | SYSTOLIC BLOOD PRESSURE: 184 MMHG | RESPIRATION RATE: 16 BRPM | TEMPERATURE: 98 F | BODY MASS INDEX: 22.88 KG/M2 | HEIGHT: 64 IN | OXYGEN SATURATION: 93 %

## 2017-10-16 LAB
ALBUMIN SERPL BCP-MCNC: 3 G/DL
ANION GAP SERPL CALC-SCNC: 7 MMOL/L
ANION GAP SERPL CALC-SCNC: 7 MMOL/L
BASOPHILS # BLD AUTO: 0 K/UL
BASOPHILS NFR BLD: 0.1 %
BUN SERPL-MCNC: 38 MG/DL
BUN SERPL-MCNC: 38 MG/DL
CALCIUM SERPL-MCNC: 8.9 MG/DL
CALCIUM SERPL-MCNC: 8.9 MG/DL
CHLORIDE SERPL-SCNC: 106 MMOL/L
CHLORIDE SERPL-SCNC: 106 MMOL/L
CO2 SERPL-SCNC: 23 MMOL/L
CO2 SERPL-SCNC: 23 MMOL/L
CREAT SERPL-MCNC: 1.3 MG/DL
CREAT SERPL-MCNC: 1.3 MG/DL
DIFFERENTIAL METHOD: ABNORMAL
EOSINOPHIL # BLD AUTO: 0 K/UL
EOSINOPHIL NFR BLD: 0 %
ERYTHROCYTE [DISTWIDTH] IN BLOOD BY AUTOMATED COUNT: 14 %
EST. GFR  (AFRICAN AMERICAN): 43 ML/MIN/1.73 M^2
EST. GFR  (AFRICAN AMERICAN): 43 ML/MIN/1.73 M^2
EST. GFR  (NON AFRICAN AMERICAN): 37 ML/MIN/1.73 M^2
EST. GFR  (NON AFRICAN AMERICAN): 37 ML/MIN/1.73 M^2
GLUCOSE SERPL-MCNC: 195 MG/DL
GLUCOSE SERPL-MCNC: 195 MG/DL
HCT VFR BLD AUTO: 30.7 %
HGB BLD-MCNC: 10.3 G/DL
LYMPHOCYTES # BLD AUTO: 0.9 K/UL
LYMPHOCYTES NFR BLD: 9.4 %
MCH RBC QN AUTO: 32.6 PG
MCHC RBC AUTO-ENTMCNC: 33.4 G/DL
MCV RBC AUTO: 98 FL
MONOCYTES # BLD AUTO: 0.2 K/UL
MONOCYTES NFR BLD: 1.7 %
NEUTROPHILS # BLD AUTO: 8.5 K/UL
NEUTROPHILS NFR BLD: 88.8 %
PHOSPHATE SERPL-MCNC: 2.8 MG/DL
PLATELET # BLD AUTO: 216 K/UL
PMV BLD AUTO: 8.9 FL
POCT GLUCOSE: 139 MG/DL (ref 70–110)
POCT GLUCOSE: 175 MG/DL (ref 70–110)
POTASSIUM SERPL-SCNC: 5.1 MMOL/L
POTASSIUM SERPL-SCNC: 5.1 MMOL/L
RBC # BLD AUTO: 3.14 M/UL
SODIUM SERPL-SCNC: 136 MMOL/L
SODIUM SERPL-SCNC: 136 MMOL/L
WBC # BLD AUTO: 9.6 K/UL

## 2017-10-16 PROCEDURE — 63600175 PHARM REV CODE 636 W HCPCS: Performed by: NURSE PRACTITIONER

## 2017-10-16 PROCEDURE — 94761 N-INVAS EAR/PLS OXIMETRY MLT: CPT

## 2017-10-16 PROCEDURE — 96365 THER/PROPH/DIAG IV INF INIT: CPT

## 2017-10-16 PROCEDURE — 25000003 PHARM REV CODE 250: Performed by: NURSE PRACTITIONER

## 2017-10-16 PROCEDURE — 99900035 HC TECH TIME PER 15 MIN (STAT)

## 2017-10-16 PROCEDURE — 96366 THER/PROPH/DIAG IV INF ADDON: CPT

## 2017-10-16 PROCEDURE — 36415 COLL VENOUS BLD VENIPUNCTURE: CPT

## 2017-10-16 PROCEDURE — 94760 N-INVAS EAR/PLS OXIMETRY 1: CPT

## 2017-10-16 PROCEDURE — 96374 THER/PROPH/DIAG INJ IV PUSH: CPT

## 2017-10-16 PROCEDURE — G0378 HOSPITAL OBSERVATION PER HR: HCPCS

## 2017-10-16 PROCEDURE — 96376 TX/PRO/DX INJ SAME DRUG ADON: CPT

## 2017-10-16 PROCEDURE — 80069 RENAL FUNCTION PANEL: CPT

## 2017-10-16 PROCEDURE — 85025 COMPLETE CBC W/AUTO DIFF WBC: CPT

## 2017-10-16 PROCEDURE — 82962 GLUCOSE BLOOD TEST: CPT | Mod: 91

## 2017-10-16 PROCEDURE — 25000003 PHARM REV CODE 250: Performed by: EMERGENCY MEDICINE

## 2017-10-16 PROCEDURE — 27000221 HC OXYGEN, UP TO 24 HOURS

## 2017-10-16 PROCEDURE — 82962 GLUCOSE BLOOD TEST: CPT

## 2017-10-16 RX ORDER — LEVOFLOXACIN 500 MG/1
500 TABLET, FILM COATED ORAL DAILY
Qty: 5 TABLET | Refills: 0 | Status: SHIPPED | OUTPATIENT
Start: 2017-10-16 | End: 2017-10-21

## 2017-10-16 RX ORDER — PREDNISONE 10 MG/1
10 TABLET ORAL DAILY
Qty: 10 TABLET | Refills: 0 | Status: SHIPPED | OUTPATIENT
Start: 2017-10-16 | End: 2017-10-26

## 2017-10-16 RX ORDER — PREDNISONE 10 MG/1
10 TABLET ORAL DAILY
Qty: 10 TABLET | Refills: 0 | Status: SHIPPED | OUTPATIENT
Start: 2017-10-16 | End: 2017-10-16

## 2017-10-16 RX ADMIN — LOSARTAN POTASSIUM 100 MG: 25 TABLET, FILM COATED ORAL at 08:10

## 2017-10-16 RX ADMIN — METHYLPREDNISOLONE SODIUM SUCCINATE 40 MG: 125 INJECTION, POWDER, FOR SOLUTION INTRAMUSCULAR; INTRAVENOUS at 05:10

## 2017-10-16 RX ADMIN — LEVOTHYROXINE SODIUM 100 MCG: 100 TABLET ORAL at 05:10

## 2017-10-16 RX ADMIN — FLUTICASONE PROPIONATE 1 SPRAY: 50 SPRAY, METERED NASAL at 08:10

## 2017-10-16 RX ADMIN — ASPIRIN 81 MG: 81 TABLET, COATED ORAL at 08:10

## 2017-10-16 RX ADMIN — FERROUS SULFATE TAB EC 325 MG (65 MG FE EQUIVALENT) 325 MG: 325 (65 FE) TABLET DELAYED RESPONSE at 08:10

## 2017-10-16 RX ADMIN — AZITHROMYCIN MONOHYDRATE 500 MG: 500 INJECTION, POWDER, LYOPHILIZED, FOR SOLUTION INTRAVENOUS at 01:10

## 2017-10-16 NOTE — PLAN OF CARE
10/16/17 1114   Final Note   Assessment Type Final Discharge Note   Discharge Disposition Home  (patient refused HH services)

## 2017-10-16 NOTE — PROGRESS NOTES
10/16/17 1002   Patient Assessment/Suction   Level of Consciousness (AVPU) alert   PRE-TX-O2-ETCO2   O2 Device (Oxygen Therapy) room air   Home Oxygen Qualification   Room Air SpO2 At Rest 96 %   Room Air SpO2 on Exertion 93 %   Home O2 Eval Comments home oxygen not needed.

## 2017-10-16 NOTE — PLAN OF CARE
10/16/17 0823   Patient Assessment/Suction   Level of Consciousness (AVPU) alert   Respiratory Effort Normal;Unlabored   All Lung Fields Breath Sounds diminished;clear   PRE-TX-O2-ETCO2   O2 Device (Oxygen Therapy) nasal cannula   $ Is the patient on Oxygen? Yes   Flow (L/min) 2   Oxygen Concentration (%) 28   SpO2 99 %   Pulse Oximetry Type Intermittent   $ Pulse Oximetry - Multiple Charge Pulse Oximetry - Multiple   Aerosol Therapy   $ Aerosol Therapy Charges PRN treatment not required   Ready to Wean/Extubation Screen   FIO2<=50 (chart decimal) 0.28

## 2017-10-16 NOTE — PLAN OF CARE
SSC sent home health referral to [facility] via [facility] system for processing, awaiting acceptance and approval. Updated patient nurse. MARIANA Andrews

## 2017-10-16 NOTE — PLAN OF CARE
"SSC met with patient to arrange for home health services, patient declined the need for services stating " I'm too active for that and all my children helps me anyway" " I really don't need that."  Updated TN, Mahsa. Darryl, SSC  "

## 2017-10-16 NOTE — PLAN OF CARE
Problem: Diabetes, Type 2 (Adult)  Goal: Signs and Symptoms of Listed Potential Problems Will be Absent, Minimized or Managed (Diabetes, Type 2)  Signs and symptoms of listed potential problems will be absent, minimized or managed by discharge/transition of care (reference Diabetes, Type 2 (Adult) CPG).   Outcome: Ongoing (interventions implemented as appropriate)  Alert and oriented  Denies pain/discomfort  Expiratory wheezing. Good cough  Short of breath after ambulating short distances. Recovers quickly  Supplemental oxygen  Will monitor lab  Will monitor blood glucose  Diet modified  Safe

## 2017-10-16 NOTE — PLAN OF CARE
Problem: Patient Care Overview  Goal: Plan of Care Review  Outcome: Ongoing (interventions implemented as appropriate)   10/16/17 1010   Coping/Psychosocial   Plan Of Care Reviewed With patient   Pt denies pain. NAD noted. POC reviewed w pt and they verbalized understanding. Tele- SR    Pt free from falls, Pt ambulates to the bathroom. Bed in low position, side rails up x 2. Call light in reach,  and wheels locked. Will continue to monitor.

## 2017-10-17 ENCOUNTER — TELEPHONE (OUTPATIENT)
Dept: MEDSURG UNIT | Facility: HOSPITAL | Age: 82
End: 2017-10-17

## 2017-10-17 RX ORDER — SIMVASTATIN 40 MG/1
TABLET, FILM COATED ORAL
Qty: 90 TABLET | Refills: 0 | Status: SHIPPED | OUTPATIENT
Start: 2017-10-17 | End: 2018-02-02 | Stop reason: SDUPTHER

## 2017-10-17 NOTE — HOSPITAL COURSE
Patient initiated on IV antibiotics, IV solumedrol, oxygen, and scheduled respiratory treatments. She responded well to treatment. HOme oxgyen evaluation performed and patient with oxygen sat 93% with exertion. She is stable for DC.     Chest: coarse no wheezing

## 2017-10-17 NOTE — DISCHARGE SUMMARY
[facility] Hospital Medicine  Discharge Summary      Patient Name: Blaire Cage  MRN: [id number]  Admission Date: 10/14/2017  Hospital Length of Stay: 0 days  Discharge Date and Time: 10/16/2017  1:47 PM  Attending Physician: No att. providers found   Discharging Provider: Sadie Saeed NP  Primary Care Provider: Eli Edmonds MD      HPI:   Ms. Cage is an 86yo F with a PMH of DM2, HTN, Hypothyroid, COPD, Chronic Diastolic HF, and CKD3. She was admitted from 9/11/17-9/14/17 for pneumonia. She presents to the ED today via EMS with c/o dyspnea. While in the ED, she had a CXR which was unremarkable. She was given IV steroid and nebulizers. She was found to be hyperkalemic and IVF infusion was initiated. She currently denies pain, SOB, or other discomforts.    * No surgery found *      Indwelling Lines/Drains at time of discharge:   Lines/Drains/Airways          No matching active lines, drains, or airways        Hospital Course:   Patient initiated on IV antibiotics, IV solumedrol, oxygen, and scheduled respiratory treatments. She responded well to treatment. HOme oxgyen evaluation performed and patient with oxygen sat 93% with exertion. She is stable for DC.     Chest: coarse no wheezing     Consults:     Significant Diagnostic Studies: Labs:   BMP: No results for input(s): GLU, NA, K, CL, CO2, BUN, CREATININE, CALCIUM, MG in the last 48 hours. and CMP No results for input(s): NA, K, CL, CO2, GLU, BUN, CREATININE, CALCIUM, PROT, ALBUMIN, BILITOT, ALKPHOS, AST, ALT, ANIONGAP, ESTGFRAFRICA, EGFRNONAA in the last 48 hours.  Radiology: X-Ray: CXR: X-Ray Chest 1 View (CXR):   Results for orders placed or performed during the hospital encounter of 10/14/17   X-Ray Chest 1 View    Narrative    Portable AP view of the chest was obtained.  Comparison -- 9/11/17. The heart size is borderline enlarged. Mediastinum shows aortic atherosclerosis. The lungs are well expanded with eventrations  noted along the diaphragms. Lungs are clear except minimal, patchy opacity at the right lower zone similar to before. This could represent atelectasis or airspace disease such as pneumonia or aspiration. Continued followup is recommended. No pleural fluid is seen. Clips are present at the right axilla.    Impression     Little change from prior exam. Continued minimal consolidation right lung base.      Electronically signed by: TIMOTHY ZIEGLER MD  Date:     10/14/17  Time:    17:13     and X-Ray Chest PA and Lateral (CXR): No results found for this visit on 10/14/17.    Pending Diagnostic Studies:     None        Final Active Diagnoses:    Diagnosis Date Noted POA    PRINCIPAL PROBLEM:  COPD with acute exacerbation [J44.1] 10/14/2017 Yes    Asthmatic bronchitis with acute exacerbation [J45.901] 10/15/2017 Yes    Hyperkalemia [E87.5] 10/15/2017 Yes    Left ventricular diastolic dysfunction with preserved systolic function [I51.9] 06/05/2016 Yes    CKD (chronic kidney disease), stage III, eGFR 39, progressive 31 [N18.3] 01/18/2013 Yes    Hypothyroid [E03.9] 01/18/2013 Yes    Hypertension associated with diabetes [E11.59, I10] 01/18/2013 Yes    Type 2 diabetes mellitus with stage 3 chronic kidney disease [E11.22, N18.3] 01/18/2013 Yes      Problems Resolved During this Admission:    Diagnosis Date Noted Date Resolved POA      No new Assessment & Plan notes have been filed under this hospital service since the last note was generated.  Service: Hospital Medicine      Discharged Condition: stable    Disposition: Home or Self Care    Follow Up:  Follow-up Information     Eli Edmonds MD In 1 week.    Specialty:  Family Medicine  Contact information:  [address] [facility] [address]  [phone]                 Patient Instructions:     Referral to Home health   Referral Priority: Routine Referral Type: Home Health   Referral Reason: Specialty Services Required    Requested Specialty: Home Health Services     Number of Visits Requested: 1      Diet general   Order Specific Question Answer Comments   Additional restrictions: Low Potassium      Activity as tolerated     Call MD for:  severe uncontrolled pain     Call MD for:  persistent nausea and vomiting or diarrhea     Call MD for:  temperature >100.4     Call MD for:  difficulty breathing or increased cough       Medications:  Reconciled Home Medications:   Discharge Medication List as of 10/16/2017 10:31 AM      START taking these medications    Details   levoFLOXacin (LEVAQUIN) 500 MG tablet Take 1 tablet (500 mg total) by mouth once daily., Starting Mon 10/16/2017, Until Sat 10/21/2017, Normal      predniSONE (DELTASONE) 10 MG tablet Take 1 tablet (10 mg total) by mouth once daily. Take 3 tablets daily on day #1-2, Take 2 tablets daily on days #3-4, Take 1 tablet daily on day #5-6, then stop taking, Starting Mon 10/16/2017, Until Thu 10/26/2017, Print         CONTINUE these medications which have NOT CHANGED    Details   albuterol-ipratropium 2.5mg-0.5mg/3mL (DUO-NEB) 0.5 mg-3 mg(2.5 mg base)/3 mL nebulizer solution Take 3 mLs by nebulization every 6 (six) hours while awake. Rescue, Starting Tue 8/22/2017, Normal      amlodipine (NORVASC) 10 MG tablet Take 1 tablet (10 mg total) by mouth once daily., Starting Wed 8/9/2017, Until Thu 8/9/2018, Normal      ascorbic acid (VITAMIN C) 500 MG tablet Take 500 mg by mouth once daily.  , Until Discontinued, Historical Med      aspirin (ECOTRIN) 81 MG EC tablet Take 81 mg by mouth once daily.  , Until Discontinued, Historical Med      calcium carbonate (OS-TASIA) 500 mg calcium (1,250 mg) tablet Take 1 tablet by mouth 2 (two) times daily.  , Until Discontinued, Historical Med      ferrous sulfate 325 mg (65 mg iron) Tab tablet Take 1 tablet (325 mg total) by mouth daily with breakfast., Starting Fri 7/14/2017, OTC      fish oil-omega-3 fatty acids 300-1,000 mg capsule Take 2 g by mouth every evening. , Until Discontinued,  Historical Med      fluticasone (FLONASE) 50 mcg/actuation nasal spray 1 spray by Each Nare route once daily., Starting 8/26/2016, Until Discontinued, Normal      gabapentin (NEURONTIN) 300 MG capsule Take 1 capsule (300 mg total) by mouth every evening., Starting Mon 5/22/2017, Normal      guanfacine (TENEX) 1 MG Tab Take 1 mg by mouth every evening. Take 1 tablet by mouth in the evening, Historical Med      levothyroxine (SYNTHROID) 100 MCG tablet Take 1 tablet (100 mcg total) by mouth before breakfast., Starting Fri 9/15/2017, Until Sat 9/15/2018, Normal      losartan (COZAAR) 100 MG tablet Take 1 tablet (100 mg total) by mouth once daily., Starting Wed 8/9/2017, Until Thu 8/9/2018, Normal      magnesium oxide (MAG-OX) 400 mg tablet take by mouth once daily, Normal      nitroGLYCERIN (NITROSTAT) 0.4 MG SL tablet Place 1 tablet (0.4 mg total) under the tongue every 5 (five) minutes as needed for Chest pain. As needed, Starting 8/26/2016, Until Discontinued, Normal      vitamin D 1000 units Tab Take 1 tablet (1,000 Units total) by mouth once daily., Starting 8/26/2016, Until Discontinued, OTC      simvastatin (ZOCOR) 40 MG tablet TAKE ONE TABLET BY MOUTH ONCE DAILY IN THE EVENING, Normal      cloNIDine (CATAPRES) 0.1 MG tablet Take 1 tablet (0.1 mg total) by mouth 2 (two) times daily. Take one tablet by mouth every hour as needed for systolic blood pressure greater than 180.  Do not take more than 3 tablets in 24 hours., Starting Wed 8/9/2017, Until Wed 10/11/2017, Print           Time spent on the discharge of patient: 35 minutes      Sadie Saeed NP  Department of Hospital Medicine  [facility]

## 2017-10-20 ENCOUNTER — DOCUMENTATION ONLY (OUTPATIENT)
Dept: FAMILY MEDICINE | Facility: CLINIC | Age: 82
End: 2017-10-20

## 2017-10-20 NOTE — PROGRESS NOTES
Pre-Visit Chart Review  For Appointment Scheduled on (date) 10/23/17    Health Maintenance Due   Topic Date Due    TETANUS VACCINE  07/21/1948    Eye Exam  10/30/2016    Foot Exam  11/29/2017

## 2017-10-23 ENCOUNTER — TELEPHONE (OUTPATIENT)
Dept: FAMILY MEDICINE | Facility: CLINIC | Age: 82
End: 2017-10-23

## 2017-10-23 ENCOUNTER — OFFICE VISIT (OUTPATIENT)
Dept: FAMILY MEDICINE | Facility: CLINIC | Age: 82
End: 2017-10-23
Payer: MEDICARE

## 2017-10-23 VITALS
HEART RATE: 72 BPM | BODY MASS INDEX: 23.04 KG/M2 | HEIGHT: 64 IN | TEMPERATURE: 98 F | WEIGHT: 134.94 LBS | SYSTOLIC BLOOD PRESSURE: 127 MMHG | DIASTOLIC BLOOD PRESSURE: 68 MMHG

## 2017-10-23 DIAGNOSIS — J44.9 CHRONIC OBSTRUCTIVE PULMONARY DISEASE, UNSPECIFIED COPD TYPE: Primary | ICD-10-CM

## 2017-10-23 DIAGNOSIS — J44.1 COPD WITH ACUTE EXACERBATION: Primary | ICD-10-CM

## 2017-10-23 DIAGNOSIS — E87.5 HYPERKALEMIA, DIMINISHED RENAL EXCRETION: ICD-10-CM

## 2017-10-23 DIAGNOSIS — N18.30 CKD (CHRONIC KIDNEY DISEASE), STAGE III: ICD-10-CM

## 2017-10-23 PROCEDURE — 99214 OFFICE O/P EST MOD 30 MIN: CPT | Mod: S$GLB,,, | Performed by: PHYSICIAN ASSISTANT

## 2017-10-23 PROCEDURE — 99499 UNLISTED E&M SERVICE: CPT | Mod: S$PBB,,, | Performed by: PHYSICIAN ASSISTANT

## 2017-10-23 PROCEDURE — 99999 PR PBB SHADOW E&M-EST. PATIENT-LVL IV: CPT | Mod: PBBFAC,,, | Performed by: PHYSICIAN ASSISTANT

## 2017-10-23 NOTE — PROGRESS NOTES
Subjective:       Patient ID: Blaire Cage is a 87 y.o. female.    Chief Complaint: Transitional Care    Patient presents for hospital follow up.  She was hospitalized for COPD exacerbation.  She was treated with IV steroids and antibiotics.  She improved clinically.  She was found to have elevated potassium and this improved with hydration.  She has CKD stage 3.  She states that she has completed the prescribed antibiotics & steroids.  She is feeling well.  She is scheduled in 2 days to have EGD for dysphagia to solids.        Review of Systems   Constitutional: Negative for activity change, appetite change, fatigue and unexpected weight change.   Eyes: Negative for visual disturbance.   Respiratory: Negative for cough and shortness of breath.    Cardiovascular: Negative for chest pain, palpitations and leg swelling.   Gastrointestinal: Negative for abdominal pain, constipation, diarrhea and nausea.   Endocrine: Negative for polydipsia and polyuria.   Genitourinary: Negative for difficulty urinating.   Musculoskeletal: Negative for arthralgias.   Skin: Negative for rash.   Neurological: Negative for dizziness, light-headedness and headaches.   Psychiatric/Behavioral: Negative for dysphoric mood and sleep disturbance. The patient is not nervous/anxious.        Objective:      Physical Exam   Constitutional: She appears well-developed and well-nourished. She is cooperative. No distress.   Eyes: Conjunctivae and EOM are normal. Pupils are equal, round, and reactive to light. No scleral icterus.   Cardiovascular: Normal rate, regular rhythm and normal heart sounds.    Pulmonary/Chest: Effort normal and breath sounds normal.   Abdominal: Soft. Bowel sounds are normal.   Musculoskeletal:        Right lower leg: She exhibits no edema.        Left lower leg: She exhibits no edema.   Neurological: She is alert.   Skin: Skin is warm and dry.   Psychiatric: She has a normal mood and affect. Her speech is normal. Judgment  normal. Cognition and memory are normal.   Vitals reviewed.      Assessment:       1. COPD with acute exacerbation, resolved     2. CKD (chronic kidney disease), stage III, eGFR 39, progressive 31    3. Hyperkalemia, diminished renal excretion        Plan:       Blaire was seen today for transitional care.    Diagnoses and all orders for this visit:    COPD with acute exacerbation, resolved   PFTs if not done in past  Not on any maintenance  medications     CKD (chronic kidney disease), stage III,   -   Repeat   Basic metabolic panel; Future    Hyperkalemia, diminished renal excretion  -    Repeat  Basic metabolic panel; Future    Further recommendations will be made based on results

## 2017-10-25 ENCOUNTER — SURGERY (OUTPATIENT)
Age: 82
End: 2017-10-25

## 2017-10-25 ENCOUNTER — ANESTHESIA (OUTPATIENT)
Dept: ENDOSCOPY | Facility: HOSPITAL | Age: 82
End: 2017-10-25
Payer: MEDICARE

## 2017-10-25 ENCOUNTER — HOSPITAL ENCOUNTER (OUTPATIENT)
Facility: HOSPITAL | Age: 82
Discharge: HOME | End: 2017-10-25
Attending: INTERNAL MEDICINE | Admitting: INTERNAL MEDICINE
Payer: MEDICARE

## 2017-10-25 ENCOUNTER — ANESTHESIA EVENT (OUTPATIENT)
Dept: ENDOSCOPY | Facility: HOSPITAL | Age: 82
End: 2017-10-25
Payer: MEDICARE

## 2017-10-25 VITALS
SYSTOLIC BLOOD PRESSURE: 111 MMHG | TEMPERATURE: 98 F | HEART RATE: 61 BPM | RESPIRATION RATE: 19 BRPM | OXYGEN SATURATION: 97 % | DIASTOLIC BLOOD PRESSURE: 51 MMHG

## 2017-10-25 VITALS — RESPIRATION RATE: 13 BRPM

## 2017-10-25 DIAGNOSIS — K44.9 HIATAL HERNIA: ICD-10-CM

## 2017-10-25 DIAGNOSIS — R13.10 DYSPHAGIA: ICD-10-CM

## 2017-10-25 DIAGNOSIS — K22.89 PRESBYESOPHAGUS: Primary | ICD-10-CM

## 2017-10-25 DIAGNOSIS — K29.70 GASTRITIS, PRESENCE OF BLEEDING UNSPECIFIED, UNSPECIFIED CHRONICITY, UNSPECIFIED GASTRITIS TYPE: ICD-10-CM

## 2017-10-25 PROCEDURE — 63600175 PHARM REV CODE 636 W HCPCS: Performed by: NURSE ANESTHETIST, CERTIFIED REGISTERED

## 2017-10-25 PROCEDURE — 43239 EGD BIOPSY SINGLE/MULTIPLE: CPT | Mod: ,,, | Performed by: INTERNAL MEDICINE

## 2017-10-25 PROCEDURE — 88305 TISSUE EXAM BY PATHOLOGIST: CPT | Mod: 26,,, | Performed by: PATHOLOGY

## 2017-10-25 PROCEDURE — 43239 EGD BIOPSY SINGLE/MULTIPLE: CPT | Performed by: INTERNAL MEDICINE

## 2017-10-25 PROCEDURE — D9220A PRA ANESTHESIA: Mod: CRNA,,, | Performed by: NURSE ANESTHETIST, CERTIFIED REGISTERED

## 2017-10-25 PROCEDURE — 25000003 PHARM REV CODE 250: Performed by: NURSE ANESTHETIST, CERTIFIED REGISTERED

## 2017-10-25 PROCEDURE — 88305 TISSUE EXAM BY PATHOLOGIST: CPT | Performed by: PATHOLOGY

## 2017-10-25 PROCEDURE — 27201012 HC FORCEPS, HOT/COLD, DISP: Performed by: INTERNAL MEDICINE

## 2017-10-25 PROCEDURE — 25000003 PHARM REV CODE 250: Performed by: INTERNAL MEDICINE

## 2017-10-25 PROCEDURE — 88342 IMHCHEM/IMCYTCHM 1ST ANTB: CPT | Mod: 26,,, | Performed by: PATHOLOGY

## 2017-10-25 PROCEDURE — 37000008 HC ANESTHESIA 1ST 15 MINUTES: Performed by: INTERNAL MEDICINE

## 2017-10-25 PROCEDURE — D9220A PRA ANESTHESIA: Mod: ANES,,, | Performed by: ANESTHESIOLOGY

## 2017-10-25 RX ORDER — GLYCOPYRROLATE 0.2 MG/ML
INJECTION INTRAMUSCULAR; INTRAVENOUS
Status: DISCONTINUED | OUTPATIENT
Start: 2017-10-25 | End: 2017-10-25

## 2017-10-25 RX ORDER — GLYCOPYRROLATE 0.2 MG/ML
INJECTION INTRAMUSCULAR; INTRAVENOUS
Status: DISCONTINUED
Start: 2017-10-25 | End: 2017-10-25 | Stop reason: HOSPADM

## 2017-10-25 RX ORDER — LIDOCAINE HYDROCHLORIDE 20 MG/ML
INJECTION, SOLUTION EPIDURAL; INFILTRATION; INTRACAUDAL; PERINEURAL
Status: DISCONTINUED
Start: 2017-10-25 | End: 2017-10-25 | Stop reason: HOSPADM

## 2017-10-25 RX ORDER — SODIUM CHLORIDE 9 MG/ML
INJECTION, SOLUTION INTRAVENOUS CONTINUOUS
Status: DISCONTINUED | OUTPATIENT
Start: 2017-10-25 | End: 2017-10-25 | Stop reason: HOSPADM

## 2017-10-25 RX ORDER — LIDOCAINE HCL/PF 100 MG/5ML
SYRINGE (ML) INTRAVENOUS
Status: DISCONTINUED | OUTPATIENT
Start: 2017-10-25 | End: 2017-10-25

## 2017-10-25 RX ORDER — PROPOFOL 10 MG/ML
VIAL (ML) INTRAVENOUS
Status: DISCONTINUED | OUTPATIENT
Start: 2017-10-25 | End: 2017-10-25

## 2017-10-25 RX ADMIN — LIDOCAINE HYDROCHLORIDE 100 MG: 20 INJECTION, SOLUTION INTRAVENOUS at 10:10

## 2017-10-25 RX ADMIN — PROPOFOL 50 MG: 10 INJECTION, EMULSION INTRAVENOUS at 10:10

## 2017-10-25 RX ADMIN — SODIUM CHLORIDE: 0.9 INJECTION, SOLUTION INTRAVENOUS at 09:10

## 2017-10-25 RX ADMIN — GLYCOPYRROLATE 0.2 MG: 0.2 INJECTION, SOLUTION INTRAMUSCULAR; INTRAVENOUS at 10:10

## 2017-10-25 NOTE — DISCHARGE INSTRUCTIONS
What Is a Hiatal Hernia?    Hiatal hernia is when the area where the stomach and esophagus meet bulges up through the diaphragm into the chest cavity. In some cases, part of the stomach may bulge above the diaphragm. Stomach acid may move up into the esophagus and cause symptoms. The symptoms are often blamed on gastroesophageal reflux disease (GERD). You may only know about the hernia when it shows up on an X-ray taken for other reasons.   What you may feel  The hiatus is a normal hole in the diaphragm. The esophagus passes through this hole and leads to the stomach. In some cases, part of the stomach may bulge above the diaphragm. This bulge is called a hernia. Stomach acid may move up into the esophagus and cause symptoms.  When you eat, the muscle at the hiatus relaxes to allow food to pass into the stomach. It tightens again to keep food and digestive acids in the stomach.  Many people with hiatal hernias have mild symptoms. You may notice the following GERD symptoms:  · Heartburn or other chest discomfort  · A feeling of chest fullness after a meal  · Frequent burping  · Acid taste in the mouth  · Trouble swallowing  Treating symptoms  If you have been diagnosed with hiatal hernia, these suggestions may help improve symptoms:  · Lose excess weight. Extra weight puts pressure on the stomach and esophagus.  · Dont lie down after eating. Sit up for at least an hour after eating. Lying down after eating can increase symptoms.  · Avoid certain foods and drinks. These include fatty foods, chocolate, coffee, mint, and other foods that cause symptoms for you.  · Dont smoke or drink alcohol. These can worsen symptoms.  · Look at your medicines. Discuss your medicines with your healthcare provider. Many medicines can cause symptoms.  · Consider an antacid medicine. Ask your healthcare provider about over-the-counter and prescription medicines that may help.  · Ask about surgery, if needed. Surgery is a treatment  choice for some people. Your healthcare provider can determine if surgery is an option for you.    Date Last Reviewed: 10/1/2016  © 3765-1977 [employer]. [address] [place] [address], [place] [address]. All rights reserved. This information is not intended as a substitute for professional medical care. Always follow your healthcare professional's instructions.        Epigastric Pain (Uncertain Cause)     Epigastric pain can be a sign of disease in the upper abdomen. Common causes include:  · Acid reflux (stomach acid flowing up into the esophagus)  · Gastritis (irritation of the stomach lining)  · Peptic Ulcer Disease  · Inflammation of the pancreas  · Gallstone  · Infection in the gallbladder  Pain may be dull or burning. It may spread upward to the chest or to the back. There may be other symptoms such as belching, bloating, cramps or hunger pains. There may be weight loss or poor appetite, nausea or vomiting.  Since the diagnosis of your pain is not certain yet, further tests may sometimes be needed. Sometimes the doctor will treat you for the most likely condition to see if there is improvement before doing further tests.  Home care  Medicines  · Antacids help neutralize the normal acids in your stomach. Examples are Maalox, Mylanta, Rolaids, and Tums. If you dont like the liquid, you can also try a chewable one. You may find one works better than another for you. Overuse can cause diarrhea or constipation.  · Acid blockers (H2 blockers) decrease acid production. Examples are cimetidine (Tagamet), famotidine (Pepcid) and ranitidine (Zantac).  · Acid inhibitors (PPIs) decrease acid production in a different way than the blockers. You may find they work better, but can take a little longer to take effect.  Examples are omeprazole (Prilosec), lansoprazole (Prevacid), pantoprazole (Protonix), rabeprazole (Aciphex), and esomeprazole (Nexium).  · Take an antacid 30-60 minutes after eating and at bedtime, but  not at the same time as an acid blocker.  · Try not to take NSAIDs. Aspirin may also cause problems, but if taking it for your heart or other medical reasons, talk to your doctor before stopping it; you do not want to cause a worse problem, like a heart attack or stroke.  Diet  · If certain foods seem to cause your spasm, try to avoid them.   · Eat slowly and chew food well before swallowing. Symptoms of gastritis can be worsened by certain foods. Limit or avoid fatty, fried, and spicy foods, as well as coffee, chocolate, mint, and foods with high acid content such as tomatoes and citrus fruit and juices (orange, grapefruit, lemon).  · Avoid alcohol, caffeine, and tobacco, which can delay healing and worsen your problem.  · Try eating smaller meals with snacks in between  Follow-up care  Follow up with your healthcare provider or as advised.  When to seek medical advice  Call your healthcare provider right away if any of the following occur:  · Stomach pain worsens or moves to the right lower part of the abdomen  · Chest pain appears, or if it worsens or spreads to the chest, back, neck, shoulder, or arm  · Frequent vomiting (cant keep down liquids)  · Blood in the stool or vomit (red or black color)  · Feeling weak or dizzy, fainting, or having trouble breathing  · Fever of 100.4ºF (38ºC) or higher, or as directed by your healthcare provider  · Abdominal swelling  Date Last Reviewed: 9/25/2015  © 8323-5614 [employer]. [address] [place] [address], [place] [address]. All rights reserved. This information is not intended as a substitute for professional medical care. Always follow your healthcare professional's instructions.

## 2017-10-25 NOTE — TRANSFER OF CARE
Anesthesia Transfer of Care Note    Patient: Blaire WISDOM Niles    Procedure(s) Performed: Procedure(s) (LRB):  ESOPHAGOGASTRODUODENOSCOPY (EGD) (N/A)    Patient location: PACU    Anesthesia Type: general    Transport from OR: Transported from OR on room air with adequate spontaneous ventilation    Post pain: adequate analgesia    Post assessment: no apparent anesthetic complications and tolerated procedure well    Post vital signs: stable    Level of consciousness: sedated    Nausea/Vomiting: no nausea/vomiting    Complications: none    Transfer of care protocol was followed      Last vitals:   Visit Vitals  BP (!) 156/67 (BP Location: Left arm, Patient Position: Lying)   Pulse 60   Temp 36.6 °C (97.9 °F) (Skin)   Resp 18   LMP  (LMP Unknown)   SpO2 98%   Breastfeeding? No

## 2017-10-25 NOTE — PLAN OF CARE
VSS, in NAD. Pt resting comfortably in bed awaiting procedure. Pt oriented to perioperative routine. Verbalized understanding.

## 2017-10-25 NOTE — ANESTHESIA POSTPROCEDURE EVALUATION
Anesthesia Post Evaluation    Patient: Blaire Cage    Procedure(s) Performed: Procedure(s) (LRB):  ESOPHAGOGASTRODUODENOSCOPY (EGD) (N/A)    Final Anesthesia Type: general  Patient location during evaluation: PACU  Patient participation: Yes- Able to Participate  Level of consciousness: awake and alert  Post-procedure vital signs: reviewed and stable  Pain management: adequate  Airway patency: patent  PONV status at discharge: No PONV  Anesthetic complications: no      Cardiovascular status: blood pressure returned to baseline and hemodynamically stable  Respiratory status: unassisted  Hydration status: euvolemic  Follow-up not needed.        Visit Vitals  BP (!) 90/51   Pulse 64   Temp 36.6 °C (97.9 °F) (Skin)   Resp 18   LMP  (LMP Unknown)   SpO2 96%   Breastfeeding? No       Pain/Mandeep Score: Pain Assessment Performed: Yes (10/25/2017 11:03 AM)  Presence of Pain: non-verbal indicators absent (10/25/2017 11:03 AM)  Mandeep Score: 9 (10/25/2017 11:03 AM)

## 2017-10-25 NOTE — PLAN OF CARE
Dr Flores was to bedside earlier discussing findings with family.  VSS in NAD, pt meets all dc criteria.  Chloe, family member, received written and verbal discharge instructions for Ms Cage. She verbalized understanding.

## 2017-10-25 NOTE — TELEPHONE ENCOUNTER
----- Message from Kemi Goldman sent at 10/25/2017  1:02 PM CDT -----  Contact: Daughter Jeannie Guerra   Returning a call from Terri,Please call Amelia Guerra  back at  [phone]

## 2017-10-25 NOTE — ANESTHESIA PREPROCEDURE EVALUATION
10/25/2017  Blaire Cage is a 87 y.o., female.    Anesthesia Evaluation    I have reviewed the Patient Summary Reports.    I have reviewed the Nursing Notes.      Review of Systems  Anesthesia Hx:  No problems with previous Anesthesia    Cardiovascular:   Hypertension, well controlled    Pulmonary:   COPD, moderate Asthma asymptomatic Used inhaler this am - asymptomatic now   Endocrine:   Diabetes, well controlled, type 2        Physical Exam  General:  Well nourished                 Anesthesia Plan  Type of Anesthesia, risks & benefits discussed:  Anesthesia Type:  general  Patient's Preference:   Intra-op Monitoring Plan:   Intra-op Monitoring Plan Comments:   Post Op Pain Control Plan:   Post Op Pain Control Plan Comments:   Induction:   IV  Beta Blocker:  Patient is not currently on a Beta-Blocker (No further documentation required).       Informed Consent: Patient understands risks and agrees with Anesthesia plan.  Questions answered. Anesthesia consent signed with patient.  ASA Score: 3     Day of Surgery Review of History & Physical:    H&P update referred to the surgeon.         Ready For Surgery From Anesthesia Perspective.

## 2017-10-26 NOTE — TELEPHONE ENCOUNTER
----- Message from Fe Ventura sent at 10/26/2017 10:34 AM CDT -----  Contact: patient  Patient returning a missed call. Please advise. Call to pod. No answer. Sent an IM and Nurse Joy said send a message.   Call back   Thanks!

## 2017-10-26 NOTE — TELEPHONE ENCOUNTER
Left message for daughter to call back about whether or not mother had a pulmonary test done and if not lets schedule one for her.

## 2017-10-26 NOTE — TELEPHONE ENCOUNTER
----- Message from Aura Treadwell sent at 10/26/2017 11:47 AM CDT -----  Contact: Daughter Annabella Guerra [phone]  Please schedule her mom for a PFT.  Also, please leave the info on her voice mail.  Thank you!

## 2017-10-30 ENCOUNTER — HOSPITAL ENCOUNTER (OUTPATIENT)
Dept: RESPIRATORY THERAPY | Facility: HOSPITAL | Age: 82
Discharge: HOME | End: 2017-10-30
Attending: PHYSICIAN ASSISTANT
Payer: MEDICARE

## 2017-10-30 DIAGNOSIS — J44.9 CHRONIC OBSTRUCTIVE PULMONARY DISEASE, UNSPECIFIED COPD TYPE: ICD-10-CM

## 2017-10-30 PROCEDURE — 94060 EVALUATION OF WHEEZING: CPT | Mod: 26,,, | Performed by: INTERNAL MEDICINE

## 2017-10-30 PROCEDURE — 94727 GAS DIL/WSHOT DETER LNG VOL: CPT

## 2017-10-30 PROCEDURE — 94060 EVALUATION OF WHEEZING: CPT

## 2017-10-31 ENCOUNTER — TELEPHONE (OUTPATIENT)
Dept: GASTROENTEROLOGY | Facility: CLINIC | Age: 82
End: 2017-10-31

## 2017-11-01 NOTE — TELEPHONE ENCOUNTER
Patient notified and understands biopsy results, continue current medications and 8 week f/u given

## 2017-11-01 NOTE — PROCEDURES
Spirometry with BD done on oct 30, 2017 shows moderate to severe obstruction, good bd response and no restriction.  fev1 is 0.82

## 2017-11-02 ENCOUNTER — HOSPITAL ENCOUNTER (OUTPATIENT)
Dept: RADIOLOGY | Facility: CLINIC | Age: 82
Discharge: HOME | End: 2017-11-02
Attending: PHYSICIAN ASSISTANT
Payer: MEDICARE

## 2017-11-02 ENCOUNTER — TELEPHONE (OUTPATIENT)
Dept: FAMILY MEDICINE | Facility: CLINIC | Age: 82
End: 2017-11-02

## 2017-11-02 DIAGNOSIS — J18.9 CAP (COMMUNITY ACQUIRED PNEUMONIA): ICD-10-CM

## 2017-11-02 DIAGNOSIS — R93.89 ABNORMAL CXR: Primary | ICD-10-CM

## 2017-11-02 PROCEDURE — 71020 XR CHEST PA AND LATERAL: CPT | Mod: 26,,, | Performed by: RADIOLOGY

## 2017-11-02 PROCEDURE — 71020 XR CHEST PA AND LATERAL: CPT | Mod: TC,PO

## 2017-11-02 NOTE — TELEPHONE ENCOUNTER
Please let patient know that the repeat xray showed larger area in the right lower that may be related to progression of pneumonia.   I would like to do a CT chest for further evaluation. I would like for her to do the CT tomorrow

## 2017-11-03 ENCOUNTER — HOSPITAL ENCOUNTER (OUTPATIENT)
Dept: RADIOLOGY | Facility: HOSPITAL | Age: 82
Discharge: HOME | End: 2017-11-03
Attending: PHYSICIAN ASSISTANT
Payer: MEDICARE

## 2017-11-03 ENCOUNTER — TELEPHONE (OUTPATIENT)
Dept: FAMILY MEDICINE | Facility: CLINIC | Age: 82
End: 2017-11-03

## 2017-11-03 DIAGNOSIS — R93.89 ABNORMAL CXR: ICD-10-CM

## 2017-11-03 DIAGNOSIS — J44.9 CHRONIC OBSTRUCTIVE PULMONARY DISEASE, UNSPECIFIED COPD TYPE: Primary | ICD-10-CM

## 2017-11-03 DIAGNOSIS — N18.9 CHRONIC KIDNEY DISEASE, UNSPECIFIED CKD STAGE: ICD-10-CM

## 2017-11-03 DIAGNOSIS — J47.9 BRONCHIECTASIS WITHOUT COMPLICATION: ICD-10-CM

## 2017-11-03 PROCEDURE — 71250 CT THORAX DX C-: CPT | Mod: 26,,, | Performed by: RADIOLOGY

## 2017-11-03 PROCEDURE — 71250 CT THORAX DX C-: CPT | Mod: TC

## 2017-11-03 NOTE — TELEPHONE ENCOUNTER
Spoke with patient about results ; states understanding; set up pulmonology consult appt on 12/20/17

## 2017-11-03 NOTE — TELEPHONE ENCOUNTER
----- Message from Catarina Wilde sent at 11/3/2017  3:02 PM CDT -----  Contact: Patient  Blaire, patient [phone], Returning the nurse's call. Please advise. thanks.

## 2017-11-03 NOTE — TELEPHONE ENCOUNTER
Please let patient know that the CT showed probable scarring or chronic changes from infection   Recommend pulmonology - referral ordered    Her lab showed a decline in kidney function  Perhaps due to her recent antibiotics   Recommend increased water intake as she appears to be dehydrated and repeat in one week.  If she is feeling weak, dizzy, dehydrated, etc she should go to ER for IV fluids

## 2017-11-06 ENCOUNTER — OFFICE VISIT (OUTPATIENT)
Dept: FAMILY MEDICINE | Facility: CLINIC | Age: 82
End: 2017-11-06
Payer: MEDICARE

## 2017-11-06 VITALS
WEIGHT: 133.63 LBS | BODY MASS INDEX: 22.81 KG/M2 | SYSTOLIC BLOOD PRESSURE: 140 MMHG | TEMPERATURE: 97 F | DIASTOLIC BLOOD PRESSURE: 90 MMHG | HEART RATE: 60 BPM | HEIGHT: 64 IN

## 2017-11-06 DIAGNOSIS — N18.30 CKD (CHRONIC KIDNEY DISEASE), STAGE III: ICD-10-CM

## 2017-11-06 DIAGNOSIS — R05.9 COUGH: ICD-10-CM

## 2017-11-06 DIAGNOSIS — J31.0 CHRONIC RHINITIS, UNSPECIFIED TYPE: ICD-10-CM

## 2017-11-06 DIAGNOSIS — J44.9 CHRONIC OBSTRUCTIVE PULMONARY DISEASE, UNSPECIFIED COPD TYPE: Primary | ICD-10-CM

## 2017-11-06 LAB
CTP QC/QA: YES
FLUAV AG NPH QL: NEGATIVE
FLUBV AG NPH QL: NEGATIVE

## 2017-11-06 PROCEDURE — 99214 OFFICE O/P EST MOD 30 MIN: CPT | Mod: S$GLB,,, | Performed by: NURSE PRACTITIONER

## 2017-11-06 PROCEDURE — 99999 PR PBB SHADOW E&M-EST. PATIENT-LVL IV: CPT | Mod: PBBFAC,,, | Performed by: NURSE PRACTITIONER

## 2017-11-06 PROCEDURE — 87804 INFLUENZA ASSAY W/OPTIC: CPT | Mod: QW,S$GLB,, | Performed by: NURSE PRACTITIONER

## 2017-11-06 RX ORDER — BENZONATATE 100 MG/1
100 CAPSULE ORAL 3 TIMES DAILY PRN
Qty: 20 CAPSULE | Refills: 0 | Status: SHIPPED | OUTPATIENT
Start: 2017-11-06 | End: 2018-02-20

## 2017-11-06 RX ORDER — DOXYCYCLINE 100 MG/1
100 CAPSULE ORAL EVERY 12 HOURS
Qty: 20 CAPSULE | Refills: 0 | Status: SHIPPED | OUTPATIENT
Start: 2017-11-06 | End: 2017-11-16

## 2017-11-06 NOTE — PROGRESS NOTES
"Subjective:       Patient ID: Blaire Cage is a 87 y.o. female.    Chief Complaint: Cough (x1day)    Patient presents with a one day history of increased cough, nose "running like a faucet" and nasal congestion.  She has a known history of COPD.  She states she is scheduled to see pulmonologist but is unsure when exactly her appointment is.  She has not tried any OTC remedies for relief of symptoms and states she does not know what she can take so she came to clinic to get something before it worsens.  She reports she also has a history of seasonal allergies and chronic rhinitis.  She is not using her Flonase.  She denies wheezing or shortness of breath. She denies any chest pain, palpitations, edema, PND, or WEST.        Cough   This is a recurrent problem. The current episode started yesterday. The problem has been waxing and waning. The problem occurs every few minutes. The cough is productive of sputum. Associated symptoms include nasal congestion, postnasal drip and rhinorrhea. Pertinent negatives include no chest pain, chills, ear congestion, ear pain, fever, headaches, heartburn, hemoptysis, myalgias, rash, sore throat, shortness of breath, sweats, weight loss or wheezing. Nothing aggravates the symptoms. She has tried nothing (reports using nebulizer sometimes. Not using flonase ) for the symptoms. The treatment provided no relief. Her past medical history is significant for asthma, bronchitis and COPD.     Review of Systems   Constitutional: Negative for chills, fatigue, fever and weight loss.   HENT: Positive for congestion (nasal ), postnasal drip, rhinorrhea and sneezing. Negative for ear pain and sore throat.    Respiratory: Positive for cough. Negative for hemoptysis, chest tightness, shortness of breath and wheezing.    Cardiovascular: Negative for chest pain, palpitations and leg swelling.   Gastrointestinal: Negative for abdominal pain, blood in stool, diarrhea, heartburn, nausea and vomiting. "   Genitourinary: Negative for difficulty urinating and dysuria.   Musculoskeletal: Negative for myalgias.   Skin: Negative for rash.   Neurological: Negative for headaches.   Hematological: Negative for adenopathy.   All other systems reviewed and are negative.      Objective:      Physical Exam   Constitutional: She is oriented to person, place, and time. She appears well-developed. No distress.   Thin    HENT:   Head: Normocephalic and atraumatic.   Right Ear: External ear normal.   Left Ear: External ear normal.   Mouth/Throat: No oropharyngeal exudate.   Nasal mucosa boggy and pink with moderate amount clear rhinorrhea.  Throat: mildly erythematous with scant amount PND.  Airway patent, tongue midline.     Eyes: Conjunctivae are normal. Right eye exhibits no discharge. Left eye exhibits no discharge. No scleral icterus.   Neck: Normal range of motion. Neck supple. No JVD present. No tracheal deviation present.   Cardiovascular: Normal rate, regular rhythm, normal heart sounds and intact distal pulses.    Pulmonary/Chest: Effort normal. No respiratory distress.   BBS coarse but mostly clear.  Expiratory phase greater than inspiratory phase.  Few scattered wheezes lower bases. No rhonchi or rales.    Abdominal: Soft. Bowel sounds are normal. She exhibits no distension.   Lymphadenopathy:     She has no cervical adenopathy.   Neurological: She is alert and oriented to person, place, and time.   Skin: Skin is warm and dry. She is not diaphoretic. There is pallor.   Poor turgor    Psychiatric: She has a normal mood and affect. Her behavior is normal.   Nursing note and vitals reviewed.      Assessment:       1. Chronic obstructive pulmonary disease, unspecified COPD type    2. Chronic rhinitis, unspecified type    3. CKD (chronic kidney disease), stage III, eGFR 39, progressive 31    4. Cough        Plan:       Chronic obstructive pulmonary disease, unspecified COPD type  -     doxycycline (VIBRAMYCIN) 100 MG Cap;  Take 1 capsule (100 mg total) by mouth every 12 (twelve) hours. Take with food  Dispense: 20 capsule; Refill: 0  -     benzonatate (TESSALON) 100 MG capsule; Take 1 capsule (100 mg total) by mouth 3 (three) times daily as needed for Cough.  Dispense: 20 capsule; Refill: 0  -     POCT Influenza A/B    Chronic rhinitis, unspecified type  -     doxycycline (VIBRAMYCIN) 100 MG Cap; Take 1 capsule (100 mg total) by mouth every 12 (twelve) hours. Take with food  Dispense: 20 capsule; Refill: 0  -     benzonatate (TESSALON) 100 MG capsule; Take 1 capsule (100 mg total) by mouth 3 (three) times daily as needed for Cough.  Dispense: 20 capsule; Refill: 0    CKD (chronic kidney disease), stage III, eGFR 39, progressive 31  -     doxycycline (VIBRAMYCIN) 100 MG Cap; Take 1 capsule (100 mg total) by mouth every 12 (twelve) hours. Take with food  Dispense: 20 capsule; Refill: 0  -     benzonatate (TESSALON) 100 MG capsule; Take 1 capsule (100 mg total) by mouth 3 (three) times daily as needed for Cough.  Dispense: 20 capsule; Refill: 0  Renal consideration given to antibiotic choice.   Cough  -     benzonatate (TESSALON) 100 MG capsule; Take 1 capsule (100 mg total) by mouth 3 (three) times daily as needed for Cough.  Dispense: 20 capsule; Refill: 0  -     POCT Influenza A/B      Influenza is negative today.  Patient will be given Tessalon for cough relief.  She was instructed to increase fluids today and rest. She will be sent out Doxycyline due to a reported history of frequent rapid COPD exacerbations that require IV antibiotics and steroids if not quickly treated.  She was instructed she must use her nebulizer and flonase as prescribed.  She is to f/u with all specialists as scheduled.  If her symptoms worsen she is to call immediately for ASAP or be taken to the ED.    I have reviewed the patient's past medical/surgical and social histories and updated as appropriate. Medications were reviewed and discussed as appropriate  including side effects and risks versus benefit.     Plan of care was reviewed and agreed upon with the patient.  An opportunity to ask questions was provided and explanation given. Patient verbalized understanding on all information reviewed and discussed.  The patient will follow up at her routinely scheduled appointment with PCP or sooner if needed.

## 2017-11-06 NOTE — PATIENT INSTRUCTIONS
Chronic Lung Disease: Preventing Lung Infections  Chronic lung diseases include chronic obstructive pulmonary disease (COPD), which includes chronic bronchitis and emphysema. Other chronic lung diseases include pulmonary fibrosis, sarcoidosis, and other conditions. When you have chronic lung diseases, it's very important to protect yourself from respiratory infections, like colds, the flu, and lung infections. Infections may cause your lung condition to worsen. Although you can't completely avoid them, there are things you can do to lessen the chance of infections.    Take precautions  Taking the following precautions can help you avoid illness:  · Remember to keep your hands away from your nose and mouth. Germs on your hands get into your respiratory system this way.  · Wash your hands often. When you wash them:  ¨ Use soap and warm water.  ¨ Rub your hands together well for at least 20 seconds.  ¨ Make sure to rinse them well.  ¨ Dry your hands on clean towels or air-dry them.  · Use hand  containing alcohol, if you are unable to wash your hands. Use the  after touching doorknobs, handles, and supermarket carts, for example, since lots of people touch them. Then wash your hands as soon as you can.  · To help prevent the flu, get a flu vaccination every year. This may be given at your healthcare provider's office, a drugstore, or pharmacy, or at work. Get your flu shot as soon as the vaccines are available in your area. This is usually around September each year.  · To help prevent pneumococcal pneumonia, get pneumonia vaccinations. Talk with your healthcare provider about which pneumococcal vaccinations you need.  · Try to stay away from people with respiratory infections, such as colds or the flu. Stay away from crowded places, like shopping centers or movie theatres during cold and flu season.  · If you smoke, think about quitting. In addition to causing or worsening many lung conditions, the  lung damage from smoking increases your risk of infections. Stay away from others who smoke, too. This is also harmful and increases your chance of infections.  Date Last Reviewed: 4/14/2016  © 4549-6833 [employer]. [address] [place] [address], [place] [address]. All rights reserved. This information is not intended as a substitute for professional medical care. Always follow your healthcare professional's instructions.

## 2017-11-10 ENCOUNTER — LAB VISIT (OUTPATIENT)
Dept: LAB | Facility: HOSPITAL | Age: 82
End: 2017-11-10
Attending: INTERNAL MEDICINE
Payer: MEDICARE

## 2017-11-10 DIAGNOSIS — M81.0 SENILE OSTEOPOROSIS: Primary | ICD-10-CM

## 2017-11-10 DIAGNOSIS — E11.9 DIABETES MELLITUS WITHOUT COMPLICATION: ICD-10-CM

## 2017-11-10 DIAGNOSIS — N20.0 URIC ACID NEPHROLITHIASIS: ICD-10-CM

## 2017-11-10 DIAGNOSIS — D64.9 ABSOLUTE ANEMIA: ICD-10-CM

## 2017-11-10 DIAGNOSIS — N18.30 CHRONIC KIDNEY DISEASE, STAGE III (MODERATE): ICD-10-CM

## 2017-11-10 DIAGNOSIS — N18.9 CHRONIC KIDNEY DISEASE, UNSPECIFIED CKD STAGE: ICD-10-CM

## 2017-11-10 LAB
ANION GAP SERPL CALC-SCNC: 8 MMOL/L
BUN SERPL-MCNC: 45 MG/DL
CALCIUM SERPL-MCNC: 10.1 MG/DL
CHLORIDE SERPL-SCNC: 102 MMOL/L
CO2 SERPL-SCNC: 27 MMOL/L
CREAT SERPL-MCNC: 1.7 MG/DL
EST. GFR  (AFRICAN AMERICAN): 30.8 ML/MIN/1.73 M^2
EST. GFR  (NON AFRICAN AMERICAN): 26.7 ML/MIN/1.73 M^2
GLUCOSE SERPL-MCNC: 105 MG/DL
POTASSIUM SERPL-SCNC: 4.7 MMOL/L
SODIUM SERPL-SCNC: 137 MMOL/L

## 2017-11-10 PROCEDURE — 36415 COLL VENOUS BLD VENIPUNCTURE: CPT | Mod: PO

## 2017-11-10 PROCEDURE — 80048 BASIC METABOLIC PNL TOTAL CA: CPT

## 2017-11-13 DIAGNOSIS — N18.9 CHRONIC KIDNEY DISEASE, UNSPECIFIED CKD STAGE: Primary | ICD-10-CM

## 2017-11-15 DIAGNOSIS — I11.9 HYPERTENSIVE LEFT VENTRICULAR HYPERTROPHY, WITHOUT HEART FAILURE: ICD-10-CM

## 2017-11-15 DIAGNOSIS — I51.89 LEFT VENTRICULAR DIASTOLIC DYSFUNCTION WITH PRESERVED SYSTOLIC FUNCTION: ICD-10-CM

## 2017-11-15 RX ORDER — GUANFACINE 1 MG/1
TABLET ORAL
Qty: 30 TABLET | Refills: 2 | Status: SHIPPED | OUTPATIENT
Start: 2017-11-15 | End: 2018-03-06 | Stop reason: SDUPTHER

## 2017-12-03 ENCOUNTER — HOSPITAL ENCOUNTER (EMERGENCY)
Facility: HOSPITAL | Age: 82
Discharge: HOME | End: 2017-12-03
Attending: EMERGENCY MEDICINE
Payer: MEDICARE

## 2017-12-03 VITALS
TEMPERATURE: 98 F | HEART RATE: 66 BPM | OXYGEN SATURATION: 100 % | DIASTOLIC BLOOD PRESSURE: 70 MMHG | SYSTOLIC BLOOD PRESSURE: 165 MMHG | WEIGHT: 133 LBS | HEIGHT: 64 IN | BODY MASS INDEX: 22.71 KG/M2

## 2017-12-03 DIAGNOSIS — R55 SYNCOPE: ICD-10-CM

## 2017-12-03 DIAGNOSIS — E87.5 HYPERKALEMIA: ICD-10-CM

## 2017-12-03 DIAGNOSIS — R55 SYNCOPE, UNSPECIFIED SYNCOPE TYPE: Primary | ICD-10-CM

## 2017-12-03 LAB
ALBUMIN SERPL BCP-MCNC: 3.2 G/DL
ALP SERPL-CCNC: 64 U/L
ALT SERPL W/O P-5'-P-CCNC: 24 U/L
ANION GAP SERPL CALC-SCNC: 8 MMOL/L
AST SERPL-CCNC: 22 U/L
BACTERIA #/AREA URNS HPF: ABNORMAL /HPF
BASOPHILS # BLD AUTO: 0 K/UL
BASOPHILS NFR BLD: 0.4 %
BILIRUB SERPL-MCNC: 0.5 MG/DL
BILIRUB UR QL STRIP: NEGATIVE
BUN SERPL-MCNC: 38 MG/DL
CALCIUM SERPL-MCNC: 8.6 MG/DL
CHLORIDE SERPL-SCNC: 105 MMOL/L
CLARITY UR: CLEAR
CO2 SERPL-SCNC: 25 MMOL/L
COLOR UR: YELLOW
CREAT SERPL-MCNC: 1.8 MG/DL
DIFFERENTIAL METHOD: ABNORMAL
EOSINOPHIL # BLD AUTO: 0.4 K/UL
EOSINOPHIL NFR BLD: 3.8 %
ERYTHROCYTE [DISTWIDTH] IN BLOOD BY AUTOMATED COUNT: 13.1 %
EST. GFR  (AFRICAN AMERICAN): 29 ML/MIN/1.73 M^2
EST. GFR  (NON AFRICAN AMERICAN): 25 ML/MIN/1.73 M^2
GLUCOSE SERPL-MCNC: 157 MG/DL
GLUCOSE UR QL STRIP: NEGATIVE
HCT VFR BLD AUTO: 30.3 %
HGB BLD-MCNC: 10.2 G/DL
HGB UR QL STRIP: NEGATIVE
HYALINE CASTS #/AREA URNS LPF: 1 /LPF
KETONES UR QL STRIP: NEGATIVE
LEUKOCYTE ESTERASE UR QL STRIP: NEGATIVE
LYMPHOCYTES # BLD AUTO: 2.8 K/UL
LYMPHOCYTES NFR BLD: 26.7 %
MCH RBC QN AUTO: 32.8 PG
MCHC RBC AUTO-ENTMCNC: 33.7 G/DL
MCV RBC AUTO: 97 FL
MICROSCOPIC COMMENT: ABNORMAL
MONOCYTES # BLD AUTO: 0.9 K/UL
MONOCYTES NFR BLD: 8.2 %
NEUTROPHILS # BLD AUTO: 6.5 K/UL
NEUTROPHILS NFR BLD: 60.9 %
NITRITE UR QL STRIP: NEGATIVE
PH UR STRIP: 7 [PH] (ref 5–8)
PLATELET # BLD AUTO: 204 K/UL
PMV BLD AUTO: 9.4 FL
POTASSIUM SERPL-SCNC: 5.4 MMOL/L
PROT SERPL-MCNC: 6.2 G/DL
PROT UR QL STRIP: ABNORMAL
RBC # BLD AUTO: 3.12 M/UL
RBC #/AREA URNS HPF: 2 /HPF (ref 0–4)
SODIUM SERPL-SCNC: 138 MMOL/L
SP GR UR STRIP: 1.01 (ref 1–1.03)
SQUAMOUS #/AREA URNS HPF: 1 /HPF
URN SPEC COLLECT METH UR: ABNORMAL
UROBILINOGEN UR STRIP-ACNC: NEGATIVE EU/DL
WBC # BLD AUTO: 10.6 K/UL
WBC #/AREA URNS HPF: 1 /HPF (ref 0–5)

## 2017-12-03 PROCEDURE — 93010 ELECTROCARDIOGRAM REPORT: CPT | Mod: ,,, | Performed by: INTERNAL MEDICINE

## 2017-12-03 PROCEDURE — 80053 COMPREHEN METABOLIC PANEL: CPT

## 2017-12-03 PROCEDURE — 81000 URINALYSIS NONAUTO W/SCOPE: CPT

## 2017-12-03 PROCEDURE — 93005 ELECTROCARDIOGRAM TRACING: CPT

## 2017-12-03 PROCEDURE — 99284 EMERGENCY DEPT VISIT MOD MDM: CPT

## 2017-12-03 PROCEDURE — 85025 COMPLETE CBC W/AUTO DIFF WBC: CPT

## 2017-12-03 PROCEDURE — 36415 COLL VENOUS BLD VENIPUNCTURE: CPT

## 2017-12-03 NOTE — ED PROVIDER NOTES
" Encounter Date: 12/3/2017    SCRIBE #1 NOTE: I, Lorrie Sebastian, am scribing for, and in the presence of,  Dr. Jara. I have scribed the entire note.       History     Chief Complaint   Patient presents with    Pre Syncope     patient reports feeling like she had to use the restroom and felt as if she was going to pass out. Denies passing out       12/03/2017 3:05 PM     Chief complaint: Syncope      Blaire Cage is a 87 y.o. female with a history of fainting, HTN, HLD, Type II DM, hypothyroidism, and COPD who presents to the ED with concern s/p syncopal episode that occurred PTA. Patient reports that she was cleaning and then cooking. She had just finished eating when she went to the bathroom. After urinating, she felt like she was going to pass out, her stomach "was flipping," and she was light-headed. Patient took "some Pepto-Bismol" with no relief. Her daughter rang the doorbell, and the patient was able to make it to the door. She sat down in a chair after letting her daughter in and passed out while sitting. Patient did not fall. The daughter does not endorse seeing any seizure activity. Per the daughter, in the past, the patient has passed out secondary to being dehydrated. Patient denies diarrhea, fever, or burning with urination. Her PSHx includes a hysterectomy, appendectomy, varicose vein surgery, and hernia repair.       The history is provided by the patient and a relative.     Review of patient's allergies indicates:   Allergen Reactions    Carvedilol Other (See Comments)     Bradycardia and syncope    Boniva [ibandronate]     Codeine     Hydralazine analogues     Iodinated contrast- oral and iv dye Hives    Lisinopril     Morphine      Past Medical History:   Diagnosis Date    Anticoagulant long-term use     aspirin    COPD (chronic obstructive pulmonary disease)     COPD with acute exacerbation 1/9/2015    Diabetes mellitus type II     Encounter for blood transfusion     Hip " arthritis 3/1/2016    Hyperlipidemia     Hypertension     Pneumonia of right lower lobe due to infectious organism 9/11/2017    Thyroid disease     hypothyroid     Past Surgical History:   Procedure Laterality Date    APPENDECTOMY      FRACTURE SURGERY      right hip     HERNIA REPAIR      groin    HYSTERECTOMY      VARICOSE VEIN SURGERY       Family History   Problem Relation Age of Onset    Hypertension Mother     Diabetes Sister     Diabetes Brother     Kidney disease Son      Social History   Substance Use Topics    Smoking status: Former Smoker     Years: 44.00     Types: Cigarettes     Quit date: 4/30/2015    Smokeless tobacco: Never Used      Comment: 1/08/2015    Alcohol use No     Review of Systems   Constitutional: Negative for fever.   HENT: Negative for sore throat.    Respiratory: Negative for shortness of breath.    Cardiovascular: Negative for chest pain.   Gastrointestinal: Negative for diarrhea and nausea.        Positive for stomach churning sensation.    Genitourinary: Negative for dysuria.   Musculoskeletal: Negative for back pain.   Skin: Negative for rash.   Neurological: Positive for syncope and light-headedness. Negative for seizures and weakness.   Hematological: Does not bruise/bleed easily.       Physical Exam     Initial Vitals [12/03/17 1429]   BP Pulse Resp Temp SpO2   139/63 64 -- 97.6 °F (36.4 °C) 100 %      MAP       88.33         Physical Exam    Nursing note and vitals reviewed.  Constitutional: She appears well-developed and well-nourished.  Non-toxic appearance. No distress. Nasal cannula in place.   Weak appearing.    HENT:   Head: Normocephalic and atraumatic.   Eyes: EOM are normal. Pupils are equal, round, and reactive to light.   Neck: Normal range of motion. Neck supple. No neck rigidity. No JVD present.   Cardiovascular: Normal rate, normal heart sounds and intact distal pulses. An irregular rhythm present.   Abdominal: Soft. Bowel sounds are normal. She  exhibits no distension. There is no tenderness. There is no rigidity, no rebound and no guarding.   Musculoskeletal: Normal range of motion.   Neurological: She is alert and oriented to person, place, and time. She has normal strength and normal reflexes. No cranial nerve deficit or sensory deficit. She exhibits normal muscle tone. Coordination normal. GCS eye subscore is 4. GCS verbal subscore is 5. GCS motor subscore is 6.   Neurologically intact.    Skin: Skin is warm and dry.   Psychiatric: She has a normal mood and affect. Her speech is normal and behavior is normal. She is not actively hallucinating.         ED Course   Procedures  Labs Reviewed   CBC W/ AUTO DIFFERENTIAL - Abnormal; Notable for the following:        Result Value    RBC 3.12 (*)     Hemoglobin 10.2 (*)     Hematocrit 30.3 (*)     MCH 32.8 (*)     All other components within normal limits   COMPREHENSIVE METABOLIC PANEL - Abnormal; Notable for the following:     Potassium 5.4 (*)     Glucose 157 (*)     BUN, Bld 38 (*)     Creatinine 1.8 (*)     Calcium 8.6 (*)     Albumin 3.2 (*)     eGFR if  29 (*)     eGFR if non  25 (*)     All other components within normal limits   URINALYSIS - Abnormal; Notable for the following:     Protein, UA 1+ (*)     All other components within normal limits   URINALYSIS MICROSCOPIC - Abnormal; Notable for the following:     Bacteria, UA Few (*)     All other components within normal limits   CULTURE, URINE     EKG Readings: (Independently Interpreted)   Initial Reading: No STEMI.   Normal sinus rhythm, 67 BPM, age undetermined possible old anterior infarct, normal ST segments and T waves normal axis.     Imaging Results          X-Ray Chest AP Portable (Final result)  Result time 12/03/17 16:57:16    Final result by Cesario Lan Jr., MD (12/03/17 16:57:16)                 Impression:     Thickening of the right major fissure. Eventration of fat through the posterior left  hemidiaphragm, both unchanged since the prior CT of November 3, 2017 pounds CTA of August 24, 2016. Otherwise negative chest      Electronically signed by: Cesario Lan MD  Date:     12/03/17  Time:    16:57              Narrative:    Single view portable chest was obtained. Comparison is made with the prior CT of the chest of November 3, 2017 and CTA of August 24, 2016.    The mediastinal and cardiac size and contour are normal. There is thickening of the right major fissure extending to the right lung base. There is eventration of fat in the posterior left hemidiaphragm. No pulmonary masses or infiltrates are seen. There is no pneumothorax or pleural effusion.                                 Medical Decision Making:   History:   Old Medical Records: I decided to obtain old medical records.  Initial Assessment:   Patient is a very pleasant 87-year-old woman who presents emergency department complaining of feeling lightheaded and having syncopal episode while sitting down in her chair today.  Symptoms began after urinating today.  Denies any chest pain or shortness of breath. Orthostatic vitals signs are negative. Patient with mild anemia at baseline.  Chronic kidney disease with BUN and creatinine at baseline.  Potassium is slightly elevated today at 5.4.  I see no evidence of acute ischemic changes or malignant arrhythmias on EKG.  Low suspicion for ACS.  Urinalysis shows few bacteria with patient has no symptoms of dysuria or frequency.  We'll obtain urine culture but defer antibiotic treatment at this time.  Patient is discharged improved in no acute distress.  She is ambulatory in the emergency department with resolution of symptoms.  I will prescribe her Kayexalate to take for 2 days.  She has follow-up with her nephrologist on Tuesday which can recheck her potassium.  Return precautions were discussed.  Clinical Tests:   Lab Tests: Reviewed and Ordered  Radiological Study: Reviewed and Ordered  Medical  Tests: Reviewed and Ordered            Scribe Attestation:   Scribe #1: I performed the above scribed service and the documentation accurately describes the services I performed. I attest to the accuracy of the note.    I, Marek Turner, personally performed the services described in this documentation. All medical record entries made by the scribe were at my direction and in my presence.  I have reviewed the chart and agree that the record reflects my personal performance and is accurate and complete. Ruben Jara MD.  6:51 PM 12/03/2017          ED Course      Clinical Impression:     1. Syncope, unspecified syncope type    2. Syncope    3. Hyperkalemia          Disposition:   Disposition: Discharged  Condition: Stable                        Ruben Jara MD  12/03/17 [id number]

## 2017-12-03 NOTE — ED NOTES
Presents to the ER with c/o near syncopal episode that started just prior to arrival when patient felt like she had to have a BM and went to the restroom when she felt as if she was going to pass out.  Patient denies actually passing out. States that she felt fine this morning when she was making food for her neighbors and sweeping her house. Patient lives alone and ambulates without difficulty. Denies any pain at this time. Patient took a dose of pepto before arriving to the ED. Mucous membranes are pink and moist. Skin is warm, dry and intact. Lungs are clear bilaterally, respirations are regular and unlabored. Denies cough, congestion, rhinorrhea or SOB. BS active x4, no tenderness with palpation, abd is soft and not distended. Denies any appetite or activity change. S1S2, capillary refill is < 2 seconds. Denies dysuria, difficulty urinating, frequency, numbness, tingling or weakness. KELLIE MERRITT

## 2017-12-03 NOTE — ED NOTES
Patient is able to ambulate to the nurse's station without difficulty. Gait is steady. Dr. Jara is aware.

## 2017-12-05 DIAGNOSIS — E11.8 TYPE 2 DIABETES MELLITUS WITH COMPLICATION, WITHOUT LONG-TERM CURRENT USE OF INSULIN: Primary | ICD-10-CM

## 2017-12-07 ENCOUNTER — DOCUMENTATION ONLY (OUTPATIENT)
Dept: FAMILY MEDICINE | Facility: CLINIC | Age: 82
End: 2017-12-07

## 2017-12-07 NOTE — PROGRESS NOTES
Pre-Visit Chart Review  For Appointment Scheduled on 12-8-17    Health Maintenance Due   Topic Date Due    TETANUS VACCINE  07/21/1948    Eye Exam  10/30/2016    Foot Exam  11/29/2017

## 2017-12-08 ENCOUNTER — DOCUMENTATION ONLY (OUTPATIENT)
Dept: FAMILY MEDICINE | Facility: CLINIC | Age: 82
End: 2017-12-08

## 2017-12-08 NOTE — PROGRESS NOTES
Pre-Visit Chart Review  For Appointment Scheduled on 12-11-17    Health Maintenance Due   Topic Date Due    TETANUS VACCINE  07/21/1948    Eye Exam  10/30/2016    Foot Exam  11/29/2017

## 2017-12-11 ENCOUNTER — OFFICE VISIT (OUTPATIENT)
Dept: FAMILY MEDICINE | Facility: CLINIC | Age: 82
End: 2017-12-11
Payer: MEDICARE

## 2017-12-11 ENCOUNTER — LAB VISIT (OUTPATIENT)
Dept: LAB | Facility: HOSPITAL | Age: 82
End: 2017-12-11
Attending: FAMILY MEDICINE
Payer: MEDICARE

## 2017-12-11 VITALS
HEART RATE: 69 BPM | SYSTOLIC BLOOD PRESSURE: 139 MMHG | DIASTOLIC BLOOD PRESSURE: 59 MMHG | BODY MASS INDEX: 22.06 KG/M2 | HEIGHT: 64 IN | WEIGHT: 129.19 LBS

## 2017-12-11 DIAGNOSIS — R55 SYNCOPE, UNSPECIFIED SYNCOPE TYPE: ICD-10-CM

## 2017-12-11 DIAGNOSIS — E87.5 HYPERKALEMIA: Primary | ICD-10-CM

## 2017-12-11 DIAGNOSIS — M25.512 CHRONIC LEFT SHOULDER PAIN: ICD-10-CM

## 2017-12-11 DIAGNOSIS — E87.5 HYPERKALEMIA: ICD-10-CM

## 2017-12-11 DIAGNOSIS — E11.40 TYPE 2 DIABETES MELLITUS WITH DIABETIC NEUROPATHY, WITHOUT LONG-TERM CURRENT USE OF INSULIN: ICD-10-CM

## 2017-12-11 DIAGNOSIS — D50.0 IRON DEFICIENCY ANEMIA DUE TO CHRONIC BLOOD LOSS: ICD-10-CM

## 2017-12-11 DIAGNOSIS — G89.29 CHRONIC LEFT SHOULDER PAIN: ICD-10-CM

## 2017-12-11 DIAGNOSIS — R19.7 DIARRHEA, UNSPECIFIED TYPE: ICD-10-CM

## 2017-12-11 DIAGNOSIS — J06.9 ACUTE UPPER RESPIRATORY INFECTION: ICD-10-CM

## 2017-12-11 LAB
ALBUMIN SERPL BCP-MCNC: 3.4 G/DL
ALP SERPL-CCNC: 71 U/L
ALT SERPL W/O P-5'-P-CCNC: 21 U/L
ANION GAP SERPL CALC-SCNC: 8 MMOL/L
AST SERPL-CCNC: 33 U/L
BASOPHILS # BLD AUTO: 0.04 K/UL
BASOPHILS NFR BLD: 0.4 %
BILIRUB SERPL-MCNC: 0.3 MG/DL
BUN SERPL-MCNC: 23 MG/DL
CALCIUM SERPL-MCNC: 9.9 MG/DL
CHLORIDE SERPL-SCNC: 105 MMOL/L
CO2 SERPL-SCNC: 26 MMOL/L
CREAT SERPL-MCNC: 1.6 MG/DL
DIFFERENTIAL METHOD: ABNORMAL
EOSINOPHIL # BLD AUTO: 0.2 K/UL
EOSINOPHIL NFR BLD: 1.9 %
ERYTHROCYTE [DISTWIDTH] IN BLOOD BY AUTOMATED COUNT: 12.3 %
EST. GFR  (AFRICAN AMERICAN): 33.2 ML/MIN/1.73 M^2
EST. GFR  (NON AFRICAN AMERICAN): 28.8 ML/MIN/1.73 M^2
GLUCOSE SERPL-MCNC: 98 MG/DL
HCT VFR BLD AUTO: 31.6 %
HGB BLD-MCNC: 10.3 G/DL
IMM GRANULOCYTES # BLD AUTO: 0.05 K/UL
IMM GRANULOCYTES NFR BLD AUTO: 0.5 %
LYMPHOCYTES # BLD AUTO: 1.5 K/UL
LYMPHOCYTES NFR BLD: 16 %
MAGNESIUM SERPL-MCNC: 2.3 MG/DL
MCH RBC QN AUTO: 32 PG
MCHC RBC AUTO-ENTMCNC: 32.6 G/DL
MCV RBC AUTO: 98 FL
MONOCYTES # BLD AUTO: 0.6 K/UL
MONOCYTES NFR BLD: 6.8 %
NEUTROPHILS # BLD AUTO: 6.8 K/UL
NEUTROPHILS NFR BLD: 74.4 %
NRBC BLD-RTO: 0 /100 WBC
PLATELET # BLD AUTO: 205 K/UL
PMV BLD AUTO: 11.1 FL
POTASSIUM SERPL-SCNC: 4.7 MMOL/L
PROT SERPL-MCNC: 7 G/DL
RBC # BLD AUTO: 3.22 M/UL
SODIUM SERPL-SCNC: 139 MMOL/L
WBC # BLD AUTO: 9.12 K/UL

## 2017-12-11 PROCEDURE — 99499 UNLISTED E&M SERVICE: CPT | Mod: S$PBB,,, | Performed by: FAMILY MEDICINE

## 2017-12-11 PROCEDURE — 85025 COMPLETE CBC W/AUTO DIFF WBC: CPT

## 2017-12-11 PROCEDURE — 36415 COLL VENOUS BLD VENIPUNCTURE: CPT | Mod: PO

## 2017-12-11 PROCEDURE — 99999 PR PBB SHADOW E&M-EST. PATIENT-LVL III: CPT | Mod: PBBFAC,,, | Performed by: FAMILY MEDICINE

## 2017-12-11 PROCEDURE — 80053 COMPREHEN METABOLIC PANEL: CPT

## 2017-12-11 PROCEDURE — 83735 ASSAY OF MAGNESIUM: CPT

## 2017-12-11 PROCEDURE — 99214 OFFICE O/P EST MOD 30 MIN: CPT | Mod: S$GLB,,, | Performed by: FAMILY MEDICINE

## 2017-12-11 RX ORDER — LEVOTHYROXINE SODIUM 100 UG/1
100 TABLET ORAL
Qty: 90 TABLET | Refills: 3 | Status: ON HOLD | OUTPATIENT
Start: 2017-12-11 | End: 2018-10-04 | Stop reason: SDUPTHER

## 2017-12-11 RX ORDER — GABAPENTIN 300 MG/1
300 CAPSULE ORAL NIGHTLY
Qty: 90 CAPSULE | Refills: 3 | Status: SHIPPED | OUTPATIENT
Start: 2017-12-11 | End: 2018-12-24 | Stop reason: SDUPTHER

## 2017-12-11 RX ORDER — FERROUS SULFATE 325(65) MG
325 TABLET ORAL
Qty: 90 TABLET | Refills: 3 | Status: SHIPPED | OUTPATIENT
Start: 2017-12-11 | End: 2018-04-12 | Stop reason: SDUPTHER

## 2017-12-11 RX ORDER — FLUTICASONE PROPIONATE 50 MCG
1 SPRAY, SUSPENSION (ML) NASAL DAILY
Qty: 48 G | Refills: 3 | Status: SHIPPED | OUTPATIENT
Start: 2017-12-11 | End: 2017-12-20 | Stop reason: SDUPTHER

## 2017-12-11 NOTE — PROGRESS NOTES
Subjective:       Patient ID: Blaire Cage is a 87 y.o. female.    Chief Complaint: Follow-up    Patient Active Problem List   Diagnosis    Diabetic neuropathy, type II diabetes mellitus    CKD (chronic kidney disease), stage III, eGFR 39, progressive 31    Hypothyroid    Hypertension associated with diabetes    Hyperlipidemia associated with type 2 diabetes mellitus    Type 2 diabetes mellitus with stage 3 chronic kidney disease    Groin pain    Right carotid bruit    COPD (chronic obstructive pulmonary disease)    Anxiety    Tobacco dependence in remission    Abdominal aortic aneurysm without rupture    Hip arthritis    Degenerative spinal arthritis    Osteoporosis    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 10/2016, 11/2016    Body mass index (BMI) 23.0-23.9, adult, today 24.1    Iron deficiency anemia due to chronic blood loss    Proteinuria due to type 2 diabetes mellitus    History of syncope in childhood    Prerenal azotemia    Drug-induced constipation    Dysphagia    COPD with acute exacerbation    Asthmatic bronchitis with acute exacerbation    Hyperkalemia   c/o mild should pain that comes and goes.  Worse with activity.  Ache.  No paresthesias.  No radiation.      C/o diarrhea starting after taking kayexalate last wed for hyperkalemia.  Still having loose stools after eating and nausea and cramping.  No blood or black stools.  No fever.  No n/v.      Seen in ED after fainting after micturition.  Had flushed feeling, lightheaded sounds vasovagal.  No sx since then.    HPI  Review of Systems   Constitutional: Positive for fatigue. Negative for fever.   Gastrointestinal: Positive for diarrhea. Negative for abdominal pain, blood in stool, constipation, nausea and  vomiting.   Musculoskeletal: Positive for arthralgias.       Objective:      Physical Exam   Constitutional: She is oriented to person, place, and time. She appears well-developed and well-nourished.   Cardiovascular: Normal rate, regular rhythm and normal heart sounds.    Pulmonary/Chest: Effort normal and breath sounds normal.   Abdominal: Soft. Bowel sounds are normal. She exhibits no distension.   Musculoskeletal: She exhibits no edema.        Left shoulder: She exhibits tenderness, bony tenderness and pain. She exhibits normal range of motion, no swelling, no effusion, no crepitus, no deformity, no laceration, no spasm and normal strength.   Neurological: She is alert and oriented to person, place, and time.   Skin: Skin is warm and dry.   Psychiatric: She has a normal mood and affect.   Nursing note and vitals reviewed.      Assessment:       1. Hyperkalemia    2. Iron deficiency anemia due to chronic blood loss    3. Acute upper respiratory infection    4. Type 2 diabetes mellitus with diabetic neuropathy, without long-term current use of insulin    5. Syncope, unspecified syncope type    6. Diarrhea, unspecified type    7. Chronic left shoulder pain        Plan:       1. Iron deficiency anemia due to chronic blood loss  Stable condition.  Continue current medications.  Will adjust based on lab findings or if condition changes.    - ferrous sulfate 325 mg (65 mg iron) Tab tablet; Take 1 tablet (325 mg total) by mouth daily with breakfast.  Dispense: 90 tablet; Refill: 3    2. Acute upper respiratory infection  Recommend otc non-sedating antihistamine such as Loratadine and/or steroid nasal spray such as Flonase as directed and as needed.  Please return to clinic if symptoms persist after these interventions.    - fluticasone (FLONASE) 50 mcg/actuation nasal spray; 1 spray by Each Nare route once daily.  Dispense: 48 g; Refill: 3    3. Type 2 diabetes mellitus with diabetic neuropathy, without long-term  current use of insulin  Stable condition.  Continue current medications.  Will adjust based on lab findings or if condition changes.    - gabapentin (NEURONTIN) 300 MG capsule; Take 1 capsule (300 mg total) by mouth every evening.  Dispense: 90 capsule; Refill: 3    4. Hyperkalemia  Screen and treat as indicated:    - CBC auto differential; Future  - Comprehensive metabolic panel; Future  - Magnesium; Future    5. Syncope, unspecified syncope type  Likely vasovagal.  Monitor for sx and treat if recurrence    6. Diarrhea, unspecified type  Side effect kayexelate.  I counseled the patient to increase intake of electrolyte rich fluids.  I advised the patient to avoid dairy products, greasy, high fiber, caffeinated and sugary foods while acutely ill and eat a bland diet.  Probiotics otc may be helpful.  If prescribed, the patient should take anti-emetics as needed as directed for nausea and vomiting.  The patient was warned to return to clinic or go to the emergency room if fever > 100.4, bloody or black tarry foods, severe abdominal pain, lethargy, dehydration, or if symptoms last longer than 10 days.  Consider stool studies if sx persist    7. Chronic left shoulder pain  Suspect OA. Rice/tylenol prn    Reeval 3 months or sooner prn

## 2017-12-11 NOTE — PATIENT INSTRUCTIONS
Walking for Fitness  Fitness walking has something for everyone, even people who are already fit. Walking is one of the safest ways to condition your body aerobically. It can boost energy, help you lose weight, and reduce stress.    Physical benefits  · Walking strengthens your heart and lungs, and tones your muscles.  · When walking, your feet land with less impact than in other sports. This reduces chances of muscle, bone, and joint injury.  · Regular walking improves your cholesterol levels and lowers your risk of heart disease. And it helps you control your blood sugar if you have diabetes.  · Walking is a weight-bearing activity, which helps maintain bone density. This can help prevent osteoporosis.  Personal rewards  · Taking walks can help you relax and manage stress. And fitness walking may make you feel better about yourself.  · Walking can help you sleep better at night and make you less likely to be depressed.  · Regular walking may help maintain your memory as you get older.  · Walking is a great way to spend extra time with friends and family members. Be sure to invite your dog along!  Q&A about fitness walking  Q: Will walking keep me fit?  A: Yes. Regular walking at the right pace gives you all the benefits of other aerobic activities, such as jogging and swimming.  Q: Will walking help me lose weight and keep it off?  A: Yes. Per mile, walking can burn as many calories as jogging. Your health care provider can help work walking into your weight-loss plan.  Q: Is walking safe for my health?  A: Yes. Walking is safe if you have high blood pressure, diabetes, heart disease, or other conditions. Talk to your healthcare provider before you start.  Date Last Reviewed: 4/1/2017 © 2000-2017 [employer]. [address] [place] [address], [place] [address]. All rights reserved. This information is not intended as a substitute for professional medical care. Always follow your healthcare professional's  instructions.        Weight Management: Getting Started  Healthy bodies come in all shapes and sizes. Not all bodies are made to be thin. For some people, a healthy weight is higher than the average weight listed on weight charts. Your healthcare provider can help you decide on a healthy weight for you.    Reasons to lose weight  Losing weight can help with some health problems, such as high blood pressure, heart disease, diabetes, sleep apnea, and arthritis. You may also feel more energy.  Set your long-term goal  Your goal doesn't even have to be a specific weight. You may decide on a fitness goal (such as being able to walk 10 miles a week), or a health goal (such as lowering your blood pressure). Choose a goal that is measurable and reasonable, so you know when you've reached it. A goal of reaching a BMI of less than 25 is not always reasonable (or possible).   Make an action plan  Habits dont change overnight. Setting your goals too high can leave you feeling discouraged if you cant reach them. Be realistic. Choose one or two small changes you can make now. Set an action plan for how you are going to make these changes. When you can stick to this plan, keep making a few more small changes. Taking small steps will help you stay on the path to success.  Track your progress  Write down your goals. Then, keep a daily record of your progress. Write down what you eat and how active you are. This record lets you look back on how much youve done. It may also help when youre feeling frustrated. Reward yourself for success. Even if you dont reach every goal, give yourself credit for what you do get done.  Get support  Encouragement from others can help make losing weight easier. Ask your family members and friends for support. They may even want to join you. Also look to your healthcare provider, registered dietitian, and  for help. Your local hospital can give you more information about  nutrition, exercise, and weight loss.  Date Last Reviewed: 1/31/2016  © 6928-3937 [employer]. [address] [place] [address], [place] [address]. All rights reserved. This information is not intended as a substitute for professional medical care. Always follow your healthcare professional's instructions.        Treating Diarrhea    Diarrhea happens when you have loose, watery, or frequent bowel movements. It is a common problem with many causes. Most cases of diarrhea clear up on their own. But certain cases may need treatment. Be sure to see your healthcare provider if your symptoms do not improve within a few days.  Getting relief  Treatment of diarrhea depends on its cause. Diarrhea caused by bacterial or parasite infection is often treated with antibiotics. Diarrhea caused by other factors, such as a stomach virus, often improves with simple home treatment. The tips below may also help relieve your symptoms.  · Drink plenty of fluids. This helps prevent too much fluid loss (dehydration). Water, clear soups, and electrolyte solutions are good choices. Avoid alcohol, coffee, tea, and milk. These can irritate your intestines and make symptoms worse.  · Suck on ice chips if drinking makes you queasy.  · Return to your normal diet slowly. You may want to eat bland foods at first, such as rice and toast. Also, you may need to avoid certain foods for a while, such as dairy products. These can make symptoms worse. Ask your healthcare provider if there are any other foods you should avoid.  · If you were prescribed antibiotics, take them as directed.  · Do not take anti-diarrhea medicines without asking your healthcare provider first.  Call your healthcare provider   Call your healthcare provider if you have any of the following:   · A fever of 100.4°F (38.0°C) or higher, or as directed by your healthcare provider  · Severe pain  · Worsening diarrhea or diarrhea for more than 2 days  · Bloody vomit or  stool  · Signs of dehydration (dizziness, dry mouth and tongue, rapid pulse, dark urine)  Date Last Reviewed: 7/1/2016  © 9216-9674 [employer]. [address] [place] [address], [place] [address]. All rights reserved. This information is not intended as a substitute for professional medical care. Always follow your healthcare professional's instructions.

## 2017-12-13 ENCOUNTER — TELEPHONE (OUTPATIENT)
Dept: FAMILY MEDICINE | Facility: CLINIC | Age: 82
End: 2017-12-13

## 2017-12-20 ENCOUNTER — OFFICE VISIT (OUTPATIENT)
Dept: PULMONOLOGY | Facility: CLINIC | Age: 82
End: 2017-12-20
Payer: MEDICARE

## 2017-12-20 VITALS
SYSTOLIC BLOOD PRESSURE: 146 MMHG | DIASTOLIC BLOOD PRESSURE: 86 MMHG | BODY MASS INDEX: 22.1 KG/M2 | WEIGHT: 129.44 LBS | HEIGHT: 64 IN | OXYGEN SATURATION: 96 % | HEART RATE: 67 BPM

## 2017-12-20 DIAGNOSIS — J06.9 ACUTE UPPER RESPIRATORY INFECTION: ICD-10-CM

## 2017-12-20 DIAGNOSIS — J43.8 OTHER EMPHYSEMA: Primary | ICD-10-CM

## 2017-12-20 DIAGNOSIS — R09.89 CHRONIC SINUS COMPLAINTS: ICD-10-CM

## 2017-12-20 DIAGNOSIS — J45.20 MILD INTERMITTENT ASTHMATIC BRONCHITIS WITHOUT COMPLICATION: ICD-10-CM

## 2017-12-20 PROCEDURE — 99999 PR PBB SHADOW E&M-EST. PATIENT-LVL IV: CPT | Mod: PBBFAC,,, | Performed by: INTERNAL MEDICINE

## 2017-12-20 PROCEDURE — 99214 OFFICE O/P EST MOD 30 MIN: CPT | Mod: S$GLB,,, | Performed by: INTERNAL MEDICINE

## 2017-12-20 PROCEDURE — 99499 UNLISTED E&M SERVICE: CPT | Mod: S$PBB,,, | Performed by: INTERNAL MEDICINE

## 2017-12-20 RX ORDER — FLUTICASONE PROPIONATE 50 MCG
2 SPRAY, SUSPENSION (ML) NASAL DAILY
Qty: 48 G | Refills: 3 | Status: SHIPPED | OUTPATIENT
Start: 2017-12-20 | End: 2022-01-01

## 2017-12-20 RX ORDER — MONTELUKAST SODIUM 10 MG/1
10 TABLET ORAL NIGHTLY
Qty: 30 TABLET | Refills: 11 | Status: SHIPPED | OUTPATIENT
Start: 2017-12-20 | End: 2018-03-23 | Stop reason: SDUPTHER

## 2017-12-20 RX ORDER — AZITHROMYCIN 500 MG/1
TABLET, FILM COATED ORAL
Qty: 3 TABLET | Refills: 3 | Status: SHIPPED | OUTPATIENT
Start: 2017-12-20 | End: 2018-02-20

## 2017-12-20 RX ORDER — PREDNISONE 20 MG/1
TABLET ORAL
Qty: 12 TABLET | Refills: 0 | Status: SHIPPED | OUTPATIENT
Start: 2017-12-20 | End: 2018-02-20

## 2017-12-20 NOTE — PROGRESS NOTES
"12/20/2017    Blaire Cage  New Patient Consult    Chief Complaint   Patient presents with    COPD    Sputum Production     sometimes yellow right now clear    Cough       HPI:  Smoked late greg, no h/o asthma.  Lives alone with daughters /grandkids in and out.   No 02- off smokes a yr.  Has sinus problems chr with good flonase response.  Mucous is clear with no c/o excess cough and no wheezes.  Breathing felt to be good usually.  Uses resp rx bid.  No hosp for lungs- h/o  Fainting.  Has had very positive result from prednisone.              The chief compliant  problem is new to me",   PFSH:  Past Medical History:   Diagnosis Date    Anticoagulant long-term use     aspirin    COPD (chronic obstructive pulmonary disease)     COPD with acute exacerbation 1/9/2015    Diabetes mellitus type II     Encounter for blood transfusion     Hip arthritis 3/1/2016    Hyperlipidemia     Hypertension     Pneumonia of right lower lobe due to infectious organism 9/11/2017    Thyroid disease     hypothyroid         Past Surgical History:   Procedure Laterality Date    APPENDECTOMY      FRACTURE SURGERY      right hip     HERNIA REPAIR      groin    HYSTERECTOMY      VARICOSE VEIN SURGERY       Social History   Substance Use Topics    Smoking status: Former Smoker     Packs/day: 0.35     Years: 44.00     Types: Cigarettes     Quit date: 4/30/2015    Smokeless tobacco: Never Used      Comment: 1/08/2015    Alcohol use No     Family History   Problem Relation Age of Onset    Hypertension Mother     Diabetes Sister     Diabetes Brother     Kidney disease Son      Review of patient's allergies indicates:   Allergen Reactions    Carvedilol Other (See Comments)     Bradycardia and syncope    Boniva [ibandronate]     Codeine     Hydralazine analogues     Iodinated contrast- oral and iv dye Hives    Kionex [sodium polystyrene sulfonate]     Lisinopril     Morphine        Performance Status:The patient's " "activity level is functions out of house.      Review of Systems:  a review of eleven systems covering constitutional, Eye, HEENT, Psych, Respiratory, Cardiac, GI, , Musculoskeletal, Endocrine, Dermatologic was negative except for pertinent findings as listed ABOVE and below: all good save above, was on dm rx, on bone rx      Exam:Comprehensive exam done. BP (!) 146/86 (BP Location: Left arm, Patient Position: Sitting)   Pulse 67   Ht 5' 4" (1.626 m)   Wt 58.7 kg (129 lb 6.6 oz)   LMP  (LMP Unknown)   SpO2 96%   BMI 22.21 kg/m²   Exam included Vitals as listed, and patient's appearance and affect and alertness and mood, oral exam for yeast and hygiene and pharynx lesions and Mallapatti (M) score, neck with inspection for jvd and masses and thyroid abnormalities and lymph nodes (supraclavicular and infraclavicular nodes and axillary also examined and noted if abn), chest exam included symmetry and effort and fremitus and percussion and auscultation, cardiac exam included rhythm and gallops and murmur and rubs and jvd and edema, abdominal exam for mass and hepatosplenomegaly and tenderness and hernias and bowel sounds, Musculoskeletal exam with muscle tone and posture and mobility/gait and  strength, and skin for rashes and cyanosis and pallor and turgor, extremity for clubbing.  Findings were normal except for pertinent findings listed below:  M1, slender, nl pharynx, no neck abn, symmetric chest, nl fremitus/percussion, good bs, RRR with no  Murmur or gallop or rub, no jvd nor edema, noclubbing, alert and moves all 4, euthymic    Radiographs (ct chest and cxr) reviewed: results reviewed  - ct chest viewed and nad.    Labs none available      eos have been up to 400!  PFT results reviewed  10/30/2017- fev 52% 0.82 with 12% bd response.    Plan:  Clinical impression is apparently straight forward and impression with management as below.    Blaire was seen today for copd, sputum production and " cough.    Diagnoses and all orders for this visit:    Other emphysema    Mild intermittent asthmatic bronchitis without complication  -     azithromycin (ZITHROMAX) 500 MG tablet; One daily for yellow mucous, repeat if needed  -     predniSONE (DELTASONE) 20 MG tablet; One daily for 3 days and repeat for flare of lung symptoms as intructed  -     montelukast (SINGULAIR) 10 mg tablet; Take 1 tablet (10 mg total) by mouth every evening.    Acute upper respiratory infection    Chronic sinus complaints  -     fluticasone (FLONASE) 50 mcg/actuation nasal spray; 2 sprays by Each Nare route once daily.  -     montelukast (SINGULAIR) 10 mg tablet; Take 1 tablet (10 mg total) by mouth every evening.        Return in about 6 months (around 6/20/2018), or if symptoms worsen or fail to improve.    Discussed with patient above for education the following:       Chronic sinus- use flonase 1-2 daily,  Try singulair to help lungs and sinus at one daily      Lung capacity is 52% or so, and better with breathing medications-   Try singulair daily    Use nebulizer therapy twice daily and repeat nebulizer medications as needed.    Use prednisone daily x 3 and repeat if lungs act up    May wish to add inhaled prednisone if needs prednisone frequently?

## 2017-12-20 NOTE — LETTER
December 20, 2017      ELLIE Cruz  [address]  [place] [address]           [facility] [facility]  [address]  [place] [address]  Phone: [phone]  Fax: [phone]          Patient: Blaire Cage   MR Number: [id number]   YOB: 1930   Date of Visit: 12/20/2017       Dear Autumn Ledbetter:    Thank you for referring Blaire Cage to me for evaluation. Attached you will find relevant portions of my assessment and plan of care.    If you have questions, please do not hesitate to call me. I look forward to following Blaire Cage along with you.    Sincerely,    Jeffrey Christopher MD    Enclosure  CC:  No Recipients    If you would like to receive this communication electronically, please contact [email or web address] or [phone] to request more information on [employer] access.    For providers and/or their staff who would like to refer a patient to [facility], please contact us through our one-stop-shop provider referral line, [facility], at [phone].    If you feel you have received this communication in error or would no longer like to receive these types of communications, please e-mail [email or web address]

## 2017-12-20 NOTE — PATIENT INSTRUCTIONS
Chronic sinus- use flonase 1-2 daily,  Try singulair to help lungs and sinus at one daily      Lung capacity is 52% or so, and better with breathing medications-   Try singulair daily    Use nebulizer therapy twice daily and repeat nebulizer medications as needed.    Use prednisone daily x 3 and repeat if lungs act up    May wish to add inhaled prednisone if needs prednisone frequently?

## 2018-01-04 ENCOUNTER — OFFICE VISIT (OUTPATIENT)
Dept: GASTROENTEROLOGY | Facility: CLINIC | Age: 83
End: 2018-01-04
Payer: MEDICARE

## 2018-01-04 VITALS
WEIGHT: 131.38 LBS | HEART RATE: 72 BPM | BODY MASS INDEX: 22.43 KG/M2 | DIASTOLIC BLOOD PRESSURE: 68 MMHG | HEIGHT: 64 IN | SYSTOLIC BLOOD PRESSURE: 142 MMHG

## 2018-01-04 DIAGNOSIS — I15.2 HYPERTENSION ASSOCIATED WITH DIABETES: ICD-10-CM

## 2018-01-04 DIAGNOSIS — E11.59 HYPERTENSION ASSOCIATED WITH DIABETES: ICD-10-CM

## 2018-01-04 DIAGNOSIS — K59.03 DRUG-INDUCED CONSTIPATION: ICD-10-CM

## 2018-01-04 DIAGNOSIS — J43.8 OTHER EMPHYSEMA: Primary | ICD-10-CM

## 2018-01-04 DIAGNOSIS — N18.30 CKD (CHRONIC KIDNEY DISEASE), STAGE III: ICD-10-CM

## 2018-01-04 PROBLEM — R13.10 DYSPHAGIA: Status: RESOLVED | Noted: 2017-09-12 | Resolved: 2018-01-04

## 2018-01-04 PROCEDURE — 99214 OFFICE O/P EST MOD 30 MIN: CPT | Mod: S$GLB,,, | Performed by: INTERNAL MEDICINE

## 2018-01-04 PROCEDURE — 99999 PR PBB SHADOW E&M-EST. PATIENT-LVL III: CPT | Mod: PBBFAC,,, | Performed by: INTERNAL MEDICINE

## 2018-01-04 NOTE — PROGRESS NOTES
Subjective:       Patient ID: Blaire Cage is a 87 y.o. female.    This is an established patient.      Chief Complaint: Follow-up (belching)    PAST HISTORY:    87 year old female with dysphagia, remote onset, associated with difficulty with solid/coarse foods, with no associated bleeding or weight loss, and with no alleviating/exacerbating factors.  She states that this has been going on for quite some time.  She has had unremarkable EGDs with Dr. Peoples in the remote past with no significant findings.  She has had colonoscopy in 2010 which was unremarkable with no further colonoscopy recommended secondary to age.  She does have a history of smoking.  She is currently admitted with a RLL pneumonia which is worse on most recent imaging.  She also has complaint of some chronic constipation.  She had normal esophagram with no filling defect and no evidence of aspiration in June.    She has done well and states that her respiratory symptoms are much improved.  She still has intermittent solid food dysphagia and dysphagia with pills.  She notes that she has no problem with soft and/or pureed foods.  No other complaints at this time.  She is not anticoagulated.    INTERVAL HISTORY:  Since last visit she reports that upper GI symptoms have completely resolved.  She had EGD done and biopsies were negative for h.pylori infection.  No new complaints.  She does have ongoing chronic constipation.  No weight loss or bleeding.    Review of Systems   Constitutional: Negative for chills, fatigue and fever.   HENT: Positive for trouble swallowing (resolved). Negative for sore throat.    Respiratory: Negative for cough, shortness of breath and wheezing.    Cardiovascular: Negative for chest pain and palpitations.   Gastrointestinal: Negative for abdominal pain, blood in stool, nausea and vomiting.   All other systems reviewed and are negative.      Objective:       Vitals:    01/04/18 1314   BP: (!) 142/68   Pulse: 72   Weight:  "59.6 kg (131 lb 6.3 oz)   Height: 5' 4" (1.626 m)       Physical Exam   Constitutional: She is oriented to person, place, and time. She appears well-developed and well-nourished.   HENT:   Head: Normocephalic and atraumatic.   Eyes: Pupils are equal, round, and reactive to light. No scleral icterus.   Cardiovascular: Normal rate and regular rhythm.    No murmur heard.  Pulmonary/Chest: Effort normal and breath sounds normal. She has no wheezes.   Abdominal: Soft. Bowel sounds are normal. She exhibits no distension. There is no tenderness.   Neurological: She is alert and oriented to person, place, and time.         CMP  Sodium   Date Value Ref Range Status   12/11/2017 139 136 - 145 mmol/L Final     Potassium   Date Value Ref Range Status   12/11/2017 4.7 3.5 - 5.1 mmol/L Final     Chloride   Date Value Ref Range Status   12/11/2017 105 95 - 110 mmol/L Final     CO2   Date Value Ref Range Status   12/11/2017 26 23 - 29 mmol/L Final     Glucose   Date Value Ref Range Status   12/11/2017 98 70 - 110 mg/dL Final     BUN, Bld   Date Value Ref Range Status   12/11/2017 23 8 - 23 mg/dL Final     Creatinine   Date Value Ref Range Status   12/11/2017 1.6 (H) 0.5 - 1.4 mg/dL Final   03/04/2013 1.1 0.5 - 1.4 mg/dL Final     Calcium   Date Value Ref Range Status   12/11/2017 9.9 8.7 - 10.5 mg/dL Final   03/04/2013 9.8 8.7 - 10.5 mg/dL Final     Total Protein   Date Value Ref Range Status   12/11/2017 7.0 6.0 - 8.4 g/dL Final     Albumin   Date Value Ref Range Status   12/11/2017 3.4 (L) 3.5 - 5.2 g/dL Final     Total Bilirubin   Date Value Ref Range Status   12/11/2017 0.3 0.1 - 1.0 mg/dL Final     Comment:     For infants and newborns, interpretation of results should be based  on gestational age, weight and in agreement with clinical  observations.  Premature Infant recommended reference ranges:  Up to 24 hours.............<8.0 mg/dL  Up to 48 hours............<12.0 mg/dL  3-5 days..................<15.0 mg/dL  6-29 " days.................<15.0 mg/dL       Alkaline Phosphatase   Date Value Ref Range Status   12/11/2017 71 55 - 135 U/L Final   03/04/2013 60 55 - 135 U/L Final     AST   Date Value Ref Range Status   12/11/2017 33 10 - 40 U/L Final   03/04/2013 21 10 - 40 U/L Final     ALT   Date Value Ref Range Status   12/11/2017 21 10 - 44 U/L Final     Anion Gap   Date Value Ref Range Status   12/11/2017 8 8 - 16 mmol/L Final   03/04/2013 10 5 - 15 meq/L Final     eGFR if    Date Value Ref Range Status   12/11/2017 33.2 (A) >60 mL/min/1.73 m^2 Final     eGFR if non    Date Value Ref Range Status   12/11/2017 28.8 (A) >60 mL/min/1.73 m^2 Final     Comment:     Calculation used to obtain the estimated glomerular filtration  rate (eGFR) is the CKD-EPI equation.          Lab Results   Component Value Date    WBC 9.12 12/11/2017    HGB 10.3 (L) 12/11/2017    HCT 31.6 (L) 12/11/2017    MCV 98 12/11/2017     12/11/2017         Assessment:       1. Other emphysema    2. Hypertension associated with diabetes    3. CKD (chronic kidney disease), stage III, eGFR 39, progressive 31    4. Drug-induced constipation        Plan:       1.  Continue current medications  2.  Recommend daily exercise, adequate water intake, and high fiber diet.  Recommend daily miralax (17g PO once or twice daily) with intermittently dosed dulcolax (every 2-3 days)  to facilitate bowel movements.  If no relief with this, consider adding emollient laxative (castor oil or mineral oil) +/- enema.  3.  Follow up in my office in 3 months.  If no relief, consider Linzess.

## 2018-02-02 RX ORDER — SIMVASTATIN 40 MG/1
TABLET, FILM COATED ORAL
Qty: 90 TABLET | Refills: 0 | Status: SHIPPED | OUTPATIENT
Start: 2018-02-02 | End: 2018-10-01 | Stop reason: SDUPTHER

## 2018-02-20 ENCOUNTER — HOSPITAL ENCOUNTER (OUTPATIENT)
Dept: RADIOLOGY | Facility: CLINIC | Age: 83
Discharge: HOME | End: 2018-02-20
Attending: PHYSICIAN ASSISTANT
Payer: MEDICARE

## 2018-02-20 ENCOUNTER — OFFICE VISIT (OUTPATIENT)
Dept: FAMILY MEDICINE | Facility: CLINIC | Age: 83
End: 2018-02-20
Payer: MEDICARE

## 2018-02-20 VITALS
WEIGHT: 136.25 LBS | HEART RATE: 71 BPM | SYSTOLIC BLOOD PRESSURE: 129 MMHG | DIASTOLIC BLOOD PRESSURE: 58 MMHG | TEMPERATURE: 98 F | HEIGHT: 64 IN | BODY MASS INDEX: 23.26 KG/M2

## 2018-02-20 DIAGNOSIS — E11.40 TYPE 2 DIABETES MELLITUS WITH DIABETIC NEUROPATHY, WITHOUT LONG-TERM CURRENT USE OF INSULIN: ICD-10-CM

## 2018-02-20 DIAGNOSIS — I51.89 LEFT VENTRICULAR DIASTOLIC DYSFUNCTION WITH PRESERVED SYSTOLIC FUNCTION: ICD-10-CM

## 2018-02-20 DIAGNOSIS — J44.9 CHRONIC OBSTRUCTIVE PULMONARY DISEASE, UNSPECIFIED COPD TYPE: Primary | ICD-10-CM

## 2018-02-20 DIAGNOSIS — R60.0 LEG EDEMA, LEFT: ICD-10-CM

## 2018-02-20 DIAGNOSIS — E11.59 HYPERTENSION ASSOCIATED WITH DIABETES: ICD-10-CM

## 2018-02-20 DIAGNOSIS — I15.2 HYPERTENSION ASSOCIATED WITH DIABETES: ICD-10-CM

## 2018-02-20 PROCEDURE — 71046 X-RAY EXAM CHEST 2 VIEWS: CPT | Mod: TC,FY,PO

## 2018-02-20 PROCEDURE — 99499 UNLISTED E&M SERVICE: CPT | Mod: S$GLB,,, | Performed by: PHYSICIAN ASSISTANT

## 2018-02-20 PROCEDURE — 1159F MED LIST DOCD IN RCRD: CPT | Mod: S$GLB,,, | Performed by: PHYSICIAN ASSISTANT

## 2018-02-20 PROCEDURE — 99999 PR PBB SHADOW E&M-EST. PATIENT-LVL III: CPT | Mod: PBBFAC,,, | Performed by: PHYSICIAN ASSISTANT

## 2018-02-20 PROCEDURE — 94640 AIRWAY INHALATION TREATMENT: CPT | Mod: S$GLB,,, | Performed by: FAMILY MEDICINE

## 2018-02-20 PROCEDURE — 71046 X-RAY EXAM CHEST 2 VIEWS: CPT | Mod: 26,,, | Performed by: RADIOLOGY

## 2018-02-20 PROCEDURE — 99214 OFFICE O/P EST MOD 30 MIN: CPT | Mod: S$GLB,,, | Performed by: PHYSICIAN ASSISTANT

## 2018-02-20 PROCEDURE — 3008F BODY MASS INDEX DOCD: CPT | Mod: S$GLB,,, | Performed by: PHYSICIAN ASSISTANT

## 2018-02-20 PROCEDURE — 1126F AMNT PAIN NOTED NONE PRSNT: CPT | Mod: S$GLB,,, | Performed by: PHYSICIAN ASSISTANT

## 2018-02-20 RX ORDER — IPRATROPIUM BROMIDE AND ALBUTEROL SULFATE 2.5; .5 MG/3ML; MG/3ML
3 SOLUTION RESPIRATORY (INHALATION)
Status: COMPLETED | OUTPATIENT
Start: 2018-02-20 | End: 2018-02-20

## 2018-02-20 RX ADMIN — IPRATROPIUM BROMIDE AND ALBUTEROL SULFATE 3 ML: 2.5; .5 SOLUTION RESPIRATORY (INHALATION) at 02:02

## 2018-02-20 NOTE — PROGRESS NOTES
Subjective:       Patient ID: Blaire Cage is a 87 y.o. female.    Chief Complaint: Leg Swelling    HPI   Patient is an 87 year old female with  DM II (well controlled with diet) , HTN, Hypothyroidism, COPD/Asthma, HLD, Osteoporosis, CKD III, CHF diastolic presenting to the clinic with concern of swelling in lower legs (mainly L foot) over the last several days.  Patient denies any associated redness or warmth. She is chronically short of breath (COPD) & chronic cough, but does not feel like she is retaining fluid in her abdomen area. She sees Dr. Trevino in cardiology. Her last echo was 6/22/16 which showed diasoltic dysfunction with preserved EG at 70.   Review of Systems   Constitutional: Negative for activity change, appetite change, chills, diaphoresis, fatigue and fever.   HENT: Negative for congestion, postnasal drip and rhinorrhea.    Respiratory: Positive for cough and shortness of breath. Negative for wheezing and stridor.    Cardiovascular: Positive for leg swelling. Negative for chest pain and palpitations.   Gastrointestinal: Negative for abdominal pain, blood in stool, constipation, diarrhea, nausea and vomiting.   Genitourinary: Negative for dysuria, frequency, hematuria and urgency.   Musculoskeletal: Negative.    Skin: Negative.  Negative for color change and rash.   Neurological: Negative for dizziness and syncope.   Psychiatric/Behavioral: Negative for agitation, behavioral problems and confusion.       Objective:      Physical Exam   Constitutional: Vital signs are normal. She appears well-developed and well-nourished. No distress.   Cardiovascular: Normal rate, regular rhythm, S1 normal, S2 normal and normal heart sounds.  Exam reveals no gallop.    No murmur heard.  Pulses:       Radial pulses are 2+ on the right side, and 2+ on the left side.   <2sec cap refill fingers bilat     Pulmonary/Chest: Effort normal. No respiratory distress. She has decreased breath sounds. She has wheezes in the  right upper field and the left upper field. She has no rhonchi.   Skin: Skin is warm and dry. She is not diaphoretic.   Very mild edmea L foot; non-pitting   Psychiatric: She has a normal mood and affect. Her speech is normal and behavior is normal. Judgment and thought content normal. Cognition and memory are normal.       Assessment:       1. Chronic obstructive pulmonary disease, unspecified COPD type    2. Leg edema, left    3. Left ventricular diastolic dysfunction with preserved systolic function    4. Hypertension associated with diabetes    5. Type 2 diabetes mellitus with diabetic neuropathy, without long-term current use of insulin        Plan:       Blaire was seen today for leg swelling.    Diagnoses and all orders for this visit:    Chronic obstructive pulmonary disease, unspecified COPD type  -     albuterol-ipratropium 2.5mg-0.5mg/3mL nebulizer solution 3 mL; Take 3 mLs by nebulization one time.    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 function  -     Basic metabolic panel; Future  -     Brain natriuretic peptide; Future  -     X-Ray Chest PA And Lateral; Future  We will f/u with labs, imaging and discuss further mangement      Hypertension associated with diabetes  Well controlled; no changes needed    Type 2 diabetes mellitus with diabetic neuropathy, without long-term current use of insulin  Well changes with diet and exercise    Patient readiness: acceptance and barriers:none    During the course of the visit the patient was educated and counseled about the following:     Diabetes:  Discussed general issues about diabetes pathophysiology and management.  Hypertension:   Medication: no change.    Goals: Diabetes: Maintain Hemoglobin A1C below 7 and Hypertension: Reduce Blood Pressure    Did patient meet goals/outcomes: No    The following self management tools provided: declined    Patient Instructions (the written plan) was given to the patient/family.      Time spent with patient: 20 minutes    Barriers to medications present (no )    Adverse reactions to current medications (no)    Over the counter medications reviewed (Yes)

## 2018-02-21 DIAGNOSIS — J44.1 CHRONIC OBSTRUCTIVE PULMONARY DISEASE WITH ACUTE EXACERBATION: Primary | ICD-10-CM

## 2018-02-26 DIAGNOSIS — I10 ESSENTIAL HYPERTENSION: ICD-10-CM

## 2018-02-27 DIAGNOSIS — I10 HYPERTENSION, UNSPECIFIED TYPE: Primary | ICD-10-CM

## 2018-02-27 RX ORDER — ACETAMINOPHEN 500 MG
1 TABLET ORAL 2 TIMES DAILY
Qty: 1 EACH | Refills: 0 | Status: SHIPPED | OUTPATIENT
Start: 2018-02-27 | End: 2018-06-27

## 2018-02-27 RX ORDER — NITROGLYCERIN 0.4 MG/1
0.4 TABLET SUBLINGUAL EVERY 5 MIN PRN
Qty: 30 TABLET | Refills: 3 | Status: SHIPPED | OUTPATIENT
Start: 2018-02-27 | End: 2022-01-01

## 2018-03-06 DIAGNOSIS — I51.89 LEFT VENTRICULAR DIASTOLIC DYSFUNCTION WITH PRESERVED SYSTOLIC FUNCTION: ICD-10-CM

## 2018-03-06 DIAGNOSIS — I11.9 HYPERTENSIVE LEFT VENTRICULAR HYPERTROPHY, WITHOUT HEART FAILURE: ICD-10-CM

## 2018-03-06 RX ORDER — GUANFACINE 1 MG/1
TABLET ORAL
Qty: 90 TABLET | Refills: 0 | Status: SHIPPED | OUTPATIENT
Start: 2018-03-06 | End: 2018-06-06 | Stop reason: SDUPTHER

## 2018-03-07 ENCOUNTER — TELEPHONE (OUTPATIENT)
Dept: FAMILY MEDICINE | Facility: CLINIC | Age: 83
End: 2018-03-07

## 2018-03-07 NOTE — TELEPHONE ENCOUNTER
----- Message from Elizabeth Luis Fernando sent at 3/7/2018  1:05 PM CST -----  Contact: [employer]/[facility]/[phone]  Please fax orders for a [employer] diabetic meter into [employer]/[phone].   Any questions call [facility] at [phone].

## 2018-03-21 DIAGNOSIS — E11.8 TYPE 2 DIABETES MELLITUS WITH COMPLICATION, WITHOUT LONG-TERM CURRENT USE OF INSULIN: Primary | ICD-10-CM

## 2018-03-22 ENCOUNTER — DOCUMENTATION ONLY (OUTPATIENT)
Dept: FAMILY MEDICINE | Facility: CLINIC | Age: 83
End: 2018-03-22

## 2018-03-23 ENCOUNTER — OFFICE VISIT (OUTPATIENT)
Dept: FAMILY MEDICINE | Facility: CLINIC | Age: 83
End: 2018-03-23
Payer: MEDICARE

## 2018-03-23 ENCOUNTER — LAB VISIT (OUTPATIENT)
Dept: LAB | Facility: HOSPITAL | Age: 83
End: 2018-03-23
Attending: FAMILY MEDICINE
Payer: MEDICARE

## 2018-03-23 VITALS
TEMPERATURE: 98 F | SYSTOLIC BLOOD PRESSURE: 124 MMHG | HEIGHT: 64 IN | BODY MASS INDEX: 23.22 KG/M2 | DIASTOLIC BLOOD PRESSURE: 58 MMHG | WEIGHT: 136 LBS | HEART RATE: 54 BPM

## 2018-03-23 DIAGNOSIS — E11.22 TYPE 2 DIABETES MELLITUS WITH STAGE 3 CHRONIC KIDNEY DISEASE, WITHOUT LONG-TERM CURRENT USE OF INSULIN: ICD-10-CM

## 2018-03-23 DIAGNOSIS — E11.9 DIABETIC EYE EXAM: ICD-10-CM

## 2018-03-23 DIAGNOSIS — J45.20 MILD INTERMITTENT ASTHMATIC BRONCHITIS WITHOUT COMPLICATION: ICD-10-CM

## 2018-03-23 DIAGNOSIS — Z01.00 DIABETIC EYE EXAM: ICD-10-CM

## 2018-03-23 DIAGNOSIS — R09.89 CHRONIC SINUS COMPLAINTS: ICD-10-CM

## 2018-03-23 DIAGNOSIS — N18.30 TYPE 2 DIABETES MELLITUS WITH STAGE 3 CHRONIC KIDNEY DISEASE, WITHOUT LONG-TERM CURRENT USE OF INSULIN: ICD-10-CM

## 2018-03-23 DIAGNOSIS — B35.1 ONYCHOMYCOSIS: Primary | ICD-10-CM

## 2018-03-23 LAB
ESTIMATED AVG GLUCOSE: 131 MG/DL
HBA1C MFR BLD HPLC: 6.2 %

## 2018-03-23 PROCEDURE — 36415 COLL VENOUS BLD VENIPUNCTURE: CPT | Mod: PO

## 2018-03-23 PROCEDURE — 99214 OFFICE O/P EST MOD 30 MIN: CPT | Mod: S$GLB,,, | Performed by: FAMILY MEDICINE

## 2018-03-23 PROCEDURE — 99999 PR PBB SHADOW E&M-EST. PATIENT-LVL III: CPT | Mod: PBBFAC,,, | Performed by: FAMILY MEDICINE

## 2018-03-23 PROCEDURE — 83036 HEMOGLOBIN GLYCOSYLATED A1C: CPT

## 2018-03-23 RX ORDER — MONTELUKAST SODIUM 10 MG/1
10 TABLET ORAL NIGHTLY
Qty: 90 TABLET | Refills: 3 | Status: SHIPPED | OUTPATIENT
Start: 2018-03-23 | End: 2019-04-30 | Stop reason: SDUPTHER

## 2018-03-23 RX ORDER — PREDNISONE 20 MG/1
20 TABLET ORAL DAILY
Status: ON HOLD | COMMUNITY
End: 2018-06-09 | Stop reason: HOSPADM

## 2018-03-23 NOTE — PROGRESS NOTES
Subjective:       Patient ID: Blaire Cage is a 87 y.o. female.    Chief Complaint: Follow-up    Patient Active Problem List   Diagnosis    Diabetic neuropathy, type II diabetes mellitus    CKD (chronic kidney disease), stage III, eGFR 39, progressive 31    Hypothyroid    Hypertension associated with diabetes    Hyperlipidemia associated with type 2 diabetes mellitus    Type 2 diabetes mellitus with stage 3 chronic kidney disease    Right carotid bruit    COPD (chronic obstructive pulmonary disease)    Anxiety    Tobacco dependence in remission    Abdominal aortic aneurysm without rupture    Hip arthritis    Degenerative spinal arthritis    Osteoporosis    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 10/2016, 11/2016    Body mass index (BMI) 23.0-23.9, adult, today 24.1    Iron deficiency anemia due to chronic blood loss    Proteinuria due to type 2 diabetes mellitus    History of syncope in childhood    Prerenal azotemia    Drug-induced constipation    COPD with acute exacerbation    Asthmatic bronchitis with acute exacerbation    Hyperkalemia     HPI  Review of Systems   Constitutional: Negative for fatigue and unexpected weight change.   Respiratory: Negative for chest tightness and shortness of breath.    Cardiovascular: Negative for chest pain, palpitations and leg swelling.   Gastrointestinal: Negative for abdominal pain.   Musculoskeletal: Negative for arthralgias.   Neurological: Negative for dizziness, syncope, light-headedness and headaches.       Objective:      Physical Exam   Constitutional: She is oriented to person, place, and time. She appears well-developed and well-nourished.   Cardiovascular: Normal rate, regular rhythm and normal heart sounds.     Pulmonary/Chest: Effort normal and breath sounds normal.   Musculoskeletal: She exhibits no edema.   Neurological: She is alert and oriented to person, place, and time.   Skin: Skin is warm and dry.   Psychiatric: She has a normal mood and affect.   Nursing note and vitals reviewed.      Assessment:       1. Onychomycosis    2. Diabetic eye exam    3. Type 2 diabetes mellitus with stage 3 chronic kidney disease, without long-term current use of insulin    4. Mild intermittent asthmatic bronchitis without complication    5. Chronic sinus complaints        Plan:       1. Onychomycosis  Refer for eval and treat  - Ambulatory referral to Podiatry    2. Diabetic eye exam  Refer for eval and treat  - Ambulatory referral to Podiatry    3. Type 2 diabetes mellitus with stage 3 chronic kidney disease, without long-term current use of insulin  Controlled on current medications.  Continue current medications.    - Hemoglobin A1c; Future    4. Mild intermittent asthmatic bronchitis without complication  Treat  - montelukast (SINGULAIR) 10 mg tablet; Take 1 tablet (10 mg total) by mouth every evening.  Dispense: 90 tablet; Refill: 3    5. Chronic sinus complaints  Treat  - montelukast (SINGULAIR) 10 mg tablet; Take 1 tablet (10 mg total) by mouth every evening.  Dispense: 90 tablet; Refill: 3      Reeval 6 months or sooner prn

## 2018-03-28 ENCOUNTER — TELEPHONE (OUTPATIENT)
Dept: SMOKING CESSATION | Facility: CLINIC | Age: 83
End: 2018-03-28

## 2018-03-28 ENCOUNTER — CLINICAL SUPPORT (OUTPATIENT)
Dept: SMOKING CESSATION | Facility: CLINIC | Age: 83
End: 2018-03-28
Payer: COMMERCIAL

## 2018-03-28 DIAGNOSIS — F17.200 NICOTINE DEPENDENCE: Primary | ICD-10-CM

## 2018-03-28 PROCEDURE — 99407 BEHAV CHNG SMOKING > 10 MIN: CPT | Mod: S$GLB,,,

## 2018-04-11 ENCOUNTER — OFFICE VISIT (OUTPATIENT)
Dept: CARDIOLOGY | Facility: CLINIC | Age: 83
End: 2018-04-11
Payer: MEDICARE

## 2018-04-11 ENCOUNTER — PATIENT MESSAGE (OUTPATIENT)
Dept: FAMILY MEDICINE | Facility: CLINIC | Age: 83
End: 2018-04-11

## 2018-04-11 VITALS
OXYGEN SATURATION: 94 % | HEART RATE: 62 BPM | WEIGHT: 131.81 LBS | BODY MASS INDEX: 22.5 KG/M2 | DIASTOLIC BLOOD PRESSURE: 54 MMHG | HEIGHT: 64 IN | SYSTOLIC BLOOD PRESSURE: 116 MMHG

## 2018-04-11 DIAGNOSIS — I35.0 NONRHEUMATIC AORTIC VALVE STENOSIS: Primary | ICD-10-CM

## 2018-04-11 DIAGNOSIS — I10 HYPERTENSION, UNSPECIFIED TYPE: Primary | ICD-10-CM

## 2018-04-11 DIAGNOSIS — I10 HYPERTENSION, UNSPECIFIED TYPE: ICD-10-CM

## 2018-04-11 DIAGNOSIS — D50.0 IRON DEFICIENCY ANEMIA DUE TO CHRONIC BLOOD LOSS: ICD-10-CM

## 2018-04-11 DIAGNOSIS — I15.9 SECONDARY HYPERTENSION: ICD-10-CM

## 2018-04-11 PROCEDURE — 99499 UNLISTED E&M SERVICE: CPT | Mod: S$GLB,,, | Performed by: INTERNAL MEDICINE

## 2018-04-11 PROCEDURE — 93000 ELECTROCARDIOGRAM COMPLETE: CPT | Mod: S$GLB,,, | Performed by: INTERNAL MEDICINE

## 2018-04-11 PROCEDURE — 99999 PR PBB SHADOW E&M-EST. PATIENT-LVL III: CPT | Mod: PBBFAC,,, | Performed by: INTERNAL MEDICINE

## 2018-04-11 PROCEDURE — 99214 OFFICE O/P EST MOD 30 MIN: CPT | Mod: S$GLB,,, | Performed by: INTERNAL MEDICINE

## 2018-04-11 NOTE — PROGRESS NOTES
[facility]    CC: Regular follow-up.  Chief Complaint   Patient presents with    Follow-up       Patient ID: Blaire Cage is a 87 y.o. female with a past medical history of hypertension, hyperlipidemia  HPI  Patient is doing well.  Denies any chest pain, shortness of breath, orthopnea, PND, syncope or presyncope    Past Medical History:   Diagnosis Date    Anticoagulant long-term use     aspirin    COPD (chronic obstructive pulmonary disease)     COPD with acute exacerbation 1/9/2015    Diabetes mellitus type II     Encounter for blood transfusion     Hip arthritis 3/1/2016    Hyperlipidemia     Hypertension     Pneumonia of right lower lobe due to infectious organism 9/11/2017    Thyroid disease     hypothyroid     Past Surgical History:   Procedure Laterality Date    APPENDECTOMY      FRACTURE SURGERY      right hip     HERNIA REPAIR      groin    HYSTERECTOMY      VARICOSE VEIN SURGERY       Social History     Social History    Marital status: Legally      Spouse name: N/A    Number of children: N/A    Years of education: N/A     Occupational History    Not on file.     Social History Main Topics    Smoking status: Former Smoker     Packs/day: 0.35     Years: 44.00     Types: Cigarettes     Quit date: 4/30/2015    Smokeless tobacco: Never Used      Comment: 1/08/2015    Alcohol use No    Drug use: No    Sexual activity: No     Other Topics Concern    Not on file     Social History Narrative    No narrative on file     Family History   Problem Relation Age of Onset    Hypertension Mother     Diabetes Sister     Diabetes Brother     Kidney disease Son        Review of patient's allergies indicates:   Allergen Reactions    Carvedilol Other (See Comments)     Bradycardia and syncope    Boniva [ibandronate]     Codeine     Hydralazine analogues     Iodinated contrast- oral and iv dye Hives    Kionex [sodium polystyrene sulfonate]     Lisinopril      Morphine        Medication List with Changes/Refills   Current Medications    ALBUTEROL-IPRATROPIUM 2.5MG-0.5MG/3ML (DUO-NEB) 0.5 MG-3 MG(2.5 MG BASE)/3 ML NEBULIZER SOLUTION    Take 3 mLs by nebulization every 6 (six) hours while awake. Rescue    AMLODIPINE (NORVASC) 10 MG TABLET    Take 1 tablet (10 mg total) by mouth once daily.    ASCORBIC ACID (VITAMIN C) 500 MG TABLET    Take 500 mg by mouth once daily.      ASPIRIN (ECOTRIN) 81 MG EC TABLET    Take 81 mg by mouth once daily.      BLOOD PRESSURE MONITOR KIT    1 Units by Misc.(Non-Drug; Combo Route) route 2 (two) times daily.    CALCIUM CARBONATE (OS-TASIA) 500 MG CALCIUM (1,250 MG) TABLET    Take 1 tablet by mouth 2 (two) times daily.      CLONIDINE (CATAPRES) 0.1 MG TABLET    Take 1 tablet (0.1 mg total) by mouth 2 (two) times daily. Take one tablet by mouth every hour as needed for systolic blood pressure greater than 180.  Do not take more than 3 tablets in 24 hours.    FERROUS SULFATE 325 MG (65 MG IRON) TAB TABLET    Take 1 tablet (325 mg total) by mouth daily with breakfast.    FISH OIL-OMEGA-3 FATTY ACIDS 300-1,000 MG CAPSULE    Take 2 g by mouth every evening.     FLUTICASONE (FLONASE) 50 MCG/ACTUATION NASAL SPRAY    2 sprays by Each Nare route once daily.    GABAPENTIN (NEURONTIN) 300 MG CAPSULE    Take 1 capsule (300 mg total) by mouth every evening.    GUANFACINE (TENEX) 1 MG TAB    TAKE ONE TABLET BY MOUTH IN THE EVENING    LEVOTHYROXINE (SYNTHROID) 100 MCG TABLET    Take 1 tablet (100 mcg total) by mouth before breakfast.    LOSARTAN (COZAAR) 100 MG TABLET    Take 1 tablet (100 mg total) by mouth once daily.    MAGNESIUM OXIDE (MAG-OX) 400 MG TABLET    take by mouth once daily    MONTELUKAST (SINGULAIR) 10 MG TABLET    Take 1 tablet (10 mg total) by mouth every evening.    NITROGLYCERIN (NITROSTAT) 0.4 MG SL TABLET    Place 1 tablet (0.4 mg total) under the tongue every 5 (five) minutes as needed for Chest pain. As needed    PREDNISONE (DELTASONE)  "20 MG TABLET    Take 20 mg by mouth once daily.    SIMVASTATIN (ZOCOR) 40 MG TABLET    TAKE ONE TABLET BY MOUTH ONCE DAILY IN THE EVENING    VITAMIN D 1000 UNITS TAB    Take 1 tablet (1,000 Units total) by mouth once daily.       Review of Systems  Constitution: Denies chills, fever, and sweats.  HENT: Denies headaches or blurry vision.  Cardiovascular: Denies chest pain or irregular heart beat.  Respiratory: Denies cough or shortness of breath.  Gastrointestinal: Denies abdominal pain, nausea, or vomiting.  Musculoskeletal: Denies muscle cramps.  Neurological: Denies dizziness or focal weakness.  Psychiatric/Behavioral: Normal mental status.  Hematologic/Lymphatic: Denies bleeding problem or easy bruising/bleeding.  Skin: Denies rash or suspicious lesions    Physical Examination  BP (!) 116/54 (BP Location: Left arm)   Pulse 62   Ht 5' 4" (1.626 m)   Wt 59.8 kg (131 lb 13.4 oz)   LMP  (LMP Unknown)   SpO2 (!) 94%   BMI 22.63 kg/m²     Constitutional: No acute distress, conversant  HEENT: Sclera anicteric, Pupils equal, round and reactive to light, extraocular motions intact, Oropharynx clear  Neck: No JVD, no carotid bruits  Cardiovascular: regular rate and rhythm, esm, rubs or gallops, normal S1/S2  Pulmonary: Clear to auscultation bilaterally  Abdominal: Abdomen soft, nontender, nondistended, positive bowel sounds  Extremities: No lower extremity edema,   Pulses:  Carotid pulses are 2+ on the right side, and 2+ on the left side.  Radial pulses are 2+ on the right side, and 2+ on the left side.   .    Skin: No ecchymosis, erythema, or ulcers  Psych: Alert and oriented x 3, appropriate affect  Neuro: CNII-XII intact, no focal deficits    Labs:  Most Recent Data  CBC:   Lab Results   Component Value Date    WBC 9.12 12/11/2017    HGB 10.3 (L) 12/11/2017    HCT 31.6 (L) 12/11/2017     12/11/2017    MCV 98 12/11/2017    RDW 12.3 12/11/2017     BMP:   Lab Results   Component Value Date     02/20/2018 "    K 4.7 02/20/2018     02/20/2018    CO2 28 02/20/2018    BUN 40 (H) 02/20/2018    CREATININE 1.7 (H) 02/20/2018     02/20/2018    CALCIUM 9.6 02/20/2018    MG 2.3 12/11/2017    PHOS 3.9 11/10/2017    PHOS 3.9 11/10/2017    PHOS 3.9 11/10/2017     LFTS;   Lab Results   Component Value Date    PROT 7.0 12/11/2017    ALBUMIN 3.4 (L) 12/11/2017    BILITOT 0.3 12/11/2017    AST 33 12/11/2017    ALKPHOS 71 12/11/2017    ALT 21 12/11/2017     COAGS:   Lab Results   Component Value Date    INR 1.0 09/11/2017     FLP:   Lab Results   Component Value Date    CHOL 149 05/23/2017    HDL 52 05/23/2017    LDLCALC 78.2 05/23/2017    TRIG 94 05/23/2017    CHOLHDL 34.9 05/23/2017     CARDIAC:   Lab Results   Component Value Date    TROPONINI <0.006 10/14/2017     (H) 02/20/2018       Imaging:    EKG: normal sinus rhythm    Echo:  CONCLUSIONS     1 - Concentric hypertrophy.     2 - Normal left ventricular systolic function (EF 60-65%).     3 - Left ventricular diastolic dysfunction.     4 - Normal right ventricular systolic function .     5 - Pulmonary hypertension. The estimated PA systolic pressure is 47 mmHg.     6 - Trivial to mild aortic stenosis, JOHN = 1.72 cm2, peak velocity = 1.75 m/s, mean gradient = 7.0 mmHg.     7 - Mild mitral regurgitation.     8 - Mild tricuspid regurgitation.      I have personally reviewed these images and echo data    Assessment/Plan:  Blaire was seen today for follow-up.    Diagnoses and all orders for this visit:    Nonrheumatic aortic valve stenosis  -     Transthoracic echo (TTE) complete ([facility]); Future    Hypertension, unspecified type  -     EKG 12-lead      Currently stable from a cardiovascular perspective   she is anemic.  She is already on iron.  I have asked her to follow-up with her primary care doctor for further treatment and further management of anemia.    Follow-up in about 6 months (around 10/11/2018).          Total  duration of face to face visit time 30 minutes.  Total time spent counseling greater than fifty percent of total visit time.  Counseling included discussion regarding imaging findings, diagnosis, possibilities, treatment options, risks and benefits.  The patient had many questions regarding the options and long-term effect which were all answered to my best ability.      Alfredito Trevino MD,MRCP,RPVI,FACC,FSCAI.  Interventional Cardiology   Phone [phone]

## 2018-04-12 RX ORDER — FERROUS SULFATE 325(65) MG
325 TABLET ORAL 2 TIMES DAILY
Qty: 180 TABLET | Refills: 3 | Status: ON HOLD | OUTPATIENT
Start: 2018-04-12 | End: 2018-10-04 | Stop reason: SDUPTHER

## 2018-04-12 NOTE — TELEPHONE ENCOUNTER
FOV: 9/26/18    LOV: 3/23/18    LR: 12/11/17    Contract/Tox:     Note: Currently stable from a cardiovascular perspective   she is anemic.  She is already on iron.  I have asked her to follow-up with her primary care doctor for further treatment and further management of anemia.  Dr. Trevino's note(in part).      Laura says he asked her to increase the Iron to BID.

## 2018-04-25 ENCOUNTER — CLINICAL SUPPORT (OUTPATIENT)
Dept: CARDIOLOGY | Facility: CLINIC | Age: 83
End: 2018-04-25
Attending: INTERNAL MEDICINE
Payer: MEDICARE

## 2018-04-25 DIAGNOSIS — I35.0 NONRHEUMATIC AORTIC VALVE STENOSIS: ICD-10-CM

## 2018-04-25 PROCEDURE — 93306 TTE W/DOPPLER COMPLETE: CPT | Mod: S$GLB,,, | Performed by: INTERNAL MEDICINE

## 2018-04-26 LAB
ASCENDING AORTA: 0.03 CM
AV MEAN GRADIENT: 5.09 MMHG
AV VALVE AREA: 2.62 CM2
CV ECHO LV RWT: 0.56 CM
DOP CALC AO PEAK VEL: 1.46 M/S
DOP CALC AO VTI: 0.34 CM
DOP CALC LVOT AREA: 3.08 CM2
DOP CALC LVOT DIAMETER: 1.98 CM
DOP CALC LVOT STROKE VOLUME: 0.89 CM3
DOP CALCLVOT PEAK VEL VTI: 0.29 CM
E WAVE DECELERATION TIME: 329.1 MSEC
E/A RATIO: 0.64
E/E' RATIO: 16.6
ECHO LV POSTERIOR WALL: 1.19 CM (ref 0.6–1.1)
FRACTIONAL SHORTENING: 42 % (ref 28–44)
INTERVENTRICULAR SEPTUM: 1.35 CM (ref 0.6–1.1)
IVRT: 0.14 MSEC
LA MAJOR: 6.09 CM
LA MINOR: 6.74 CM
LA WIDTH: 2.37
LEFT ATRIUM SIZE: 4.29 CM
LEFT INTERNAL DIMENSION IN SYSTOLE: 2.63 CM (ref 2.1–4)
LEFT VENTRICULAR INTERNAL DIMENSION IN DIASTOLE: 4.55 CM (ref 3.5–6)
LV LATERAL E/E' RATIO: 16.6
LV SEPTAL E/E' RATIO: 16.6
MV PEAK A VEL: 1.29 M/S
MV PEAK E VEL: 0.83 M/S
MV STENOSIS PRESSURE HALF TIME: 95.44 MS
MV VALVE AREA P 1/2 METHOD: 2.31 CM2
PISA TR MAX VEL: 3.14 M/S
PULM VEIN S/D RATIO: 1.87
PV PEAK D VEL: 0.15 M/S
PV PEAK S VEL: 0.28 M/S
RA MAJOR: 5.53 CM
RA PRESSURE: 3 MMHG
RA WIDTH: 3.19 CM
RIGHT VENTRICULAR END-DIASTOLIC DIMENSION: 2.34 CM
SINUS: 0.02 CM
STJ: 0.02 CM
TDI LATERAL: 0.05
TDI SEPTAL: 0.05
TDI: 0.05
TR MAX PG: 39.49 MMHG
TRICUSPID ANNULAR PLANE SYSTOLIC EXCURSION: 0.01 CM
TV REST PULMONARY ARTERY PRESSURE: 42.49 MMHG

## 2018-04-30 ENCOUNTER — PATIENT MESSAGE (OUTPATIENT)
Dept: CARDIOLOGY | Facility: CLINIC | Age: 83
End: 2018-04-30

## 2018-05-01 ENCOUNTER — OFFICE VISIT (OUTPATIENT)
Dept: PODIATRY | Facility: CLINIC | Age: 83
End: 2018-05-01
Payer: MEDICARE

## 2018-05-01 VITALS — WEIGHT: 131.81 LBS | HEIGHT: 64 IN | BODY MASS INDEX: 22.5 KG/M2

## 2018-05-01 DIAGNOSIS — M20.41 HAMMER TOES OF BOTH FEET: ICD-10-CM

## 2018-05-01 DIAGNOSIS — B35.1 ONYCHOMYCOSIS DUE TO DERMATOPHYTE: ICD-10-CM

## 2018-05-01 DIAGNOSIS — M20.42 HAMMER TOES OF BOTH FEET: ICD-10-CM

## 2018-05-01 DIAGNOSIS — E11.42 DIABETIC POLYNEUROPATHY ASSOCIATED WITH TYPE 2 DIABETES MELLITUS: Primary | ICD-10-CM

## 2018-05-01 DIAGNOSIS — L84 CORN OR CALLUS: ICD-10-CM

## 2018-05-01 PROCEDURE — 11721 DEBRIDE NAIL 6 OR MORE: CPT | Mod: 59,Q9,S$GLB, | Performed by: PODIATRIST

## 2018-05-01 PROCEDURE — 99213 OFFICE O/P EST LOW 20 MIN: CPT | Mod: 25,S$GLB,, | Performed by: PODIATRIST

## 2018-05-01 PROCEDURE — 99999 PR PBB SHADOW E&M-EST. PATIENT-LVL III: CPT | Mod: PBBFAC,,, | Performed by: PODIATRIST

## 2018-05-01 PROCEDURE — 11055 PARING/CUTG B9 HYPRKER LES 1: CPT | Mod: Q9,S$GLB,, | Performed by: PODIATRIST

## 2018-05-01 NOTE — LETTER
May 7, 2018      Eli Edmonds MD  [address]  [place] [address]           [place] [facility]  [address]  [place] [address]  Phone: [phone]          Patient: Blaire Cage   MR Number: [id number]   YOB: 1930   Date of Visit: 5/1/2018       Dear Dr. Eli Edmonds:    Thank you for referring Blaire Cage to me for evaluation. Attached you will find relevant portions of my assessment and plan of care.    If you have questions, please do not hesitate to call me. I look forward to following Blaire Cage along with you.    Sincerely,    Emerson Chan, LUIS    Enclosure  CC:  No Recipients    If you would like to receive this communication electronically, please contact [email or web address] or [phone] to request more information on [facility] access.    For providers and/or their staff who would like to refer a patient to [facility], please contact us through our one-stop-shop provider referral line, [facility], at [phone].    If you feel you have received this communication in error or would no longer like to receive these types of communications, please e-mail [email or web address]

## 2018-05-01 NOTE — PROGRESS NOTES
Subjective:      Patient ID: Blaire Cage is a 87 y.o. female.    Chief Complaint: Diabetic Foot Exam (PCP--3/23/18, A1c-6.2-3/23/18) and Routine Foot Care    Blaire is a 87 y.o. female who presents to the clinic for routine evaluation and treatment of diabetic feet. Blaire has a past medical history of Anticoagulant long-term use; COPD (chronic obstructive pulmonary disease); COPD with acute exacerbation (1/9/2015); Diabetes mellitus type II; Encounter for blood transfusion; Hip arthritis (3/1/2016); Hyperlipidemia; Hypertension; Pneumonia of right lower lobe due to infectious organism (9/11/2017); and Thyroid disease. Patient relates no major problem with feet. Only complaints today consist of toenails in need of trimming.  Relates having occasional discomfort from toenails with wearing closed toe shoes.  Has not attempted to self treat.  Denies any additional pedal complaints.     PCP: Dr. Edmonds   Date Last Seen by PCP: 5/22/17    Current shoe gear: Rx diabetic shoes with heat molded insoles.     Hemoglobin A1C   Date Value Ref Range Status   03/23/2018 6.2 (H) 4.0 - 5.6 % Final     Comment:     According to ADA guidelines, hemoglobin A1c <7.0% represents  optimal control in non-pregnant diabetic patients. Different  metrics may apply to specific patient populations.   Standards of Medical Care in Diabetes-2016.  For the purpose of screening for the presence of diabetes:  <5.7%     Consistent with the absence of diabetes  5.7-6.4%  Consistent with increasing risk for diabetes   (prediabetes)  >or=6.5%  Consistent with diabetes  Currently, no consensus exists for use of hemoglobin A1c  for diagnosis of diabetes for children.  This Hemoglobin A1c assay has significant interference with fetal   hemoglobin   (HbF). The results are invalid for patients with abnormal amounts of   HbF,   including those with known Hereditary Persistence   of Fetal Hemoglobin. Heterozygous hemoglobin variants (HbAS,  HbAC,   HbAD, HbAE, HbA2) do not significantly interfere with this assay;   however, presence of multiple variants in a sample may impact the %   interference.     09/11/2017 6.2 (H) 4.0 - 5.6 % Final     Comment:     According to ADA guidelines, hemoglobin A1c <7.0% represents  optimal control in non-pregnant diabetic patients. Different  metrics may apply to specific patient populations.   Standards of Medical Care in Diabetes-2016.  For the purpose of screening for the presence of diabetes:  <5.7%     Consistent with the absence of diabetes  5.7-6.4%  Consistent with increasing risk for diabetes   (prediabetes)  >or=6.5%  Consistent with diabetes  Currently, no consensus exists for use of hemoglobin A1c  for diagnosis of diabetes for children.  This Hemoglobin A1c assay has significant interference with fetal   hemoglobin   (HbF). The results are invalid for patients with abnormal amounts of   HbF,   including those with known Hereditary Persistence   of Fetal Hemoglobin. Heterozygous hemoglobin variants (HbAS, HbAC,   HbAD, HbAE, HbA2) do not significantly interfere with this assay;   however, presence of multiple variants in a sample may impact the %   interference.     05/23/2017 6.5 (H) 4.5 - 6.2 % Final     Comment:     According to ADA guidelines, hemoglobin A1C <7.0% represents  optimal control in non-pregnant diabetic patients.  Different  metrics may apply to specific populations.   Standards of Medical Care in Diabetes - 2016.  For the purpose of screening for the presence of diabetes:  <5.7%     Consistent with the absence of diabetes  5.7-6.4%  Consistent with increasing risk for diabetes   (prediabetes)  >or=6.5%  Consistent with diabetes  Currently no consensus exists for use of hemoglobin A1C  for diagnosis of diabetes for children.     05/23/2017 6.5 (H) 4.5 - 6.2 % Final     Comment:     According to ADA guidelines, hemoglobin A1C <7.0% represents  optimal control in non-pregnant diabetic  patients.  Different  metrics may apply to specific populations.   Standards of Medical Care in Diabetes - 2016.  For the purpose of screening for the presence of diabetes:  <5.7%     Consistent with the absence of diabetes  5.7-6.4%  Consistent with increasing risk for diabetes   (prediabetes)  >or=6.5%  Consistent with diabetes  Currently no consensus exists for use of hemoglobin A1C  for diagnosis of diabetes for children.             Past Medical History:   Diagnosis Date    Anticoagulant long-term use     aspirin    COPD (chronic obstructive pulmonary disease)     COPD with acute exacerbation 1/9/2015    Diabetes mellitus type II     Encounter for blood transfusion     Hip arthritis 3/1/2016    Hyperlipidemia     Hypertension     Pneumonia of right lower lobe due to infectious organism 9/11/2017    Thyroid disease     hypothyroid       Past Surgical History:   Procedure Laterality Date    APPENDECTOMY      FRACTURE SURGERY      right hip     HERNIA REPAIR      groin    HYSTERECTOMY      VARICOSE VEIN SURGERY         Family History   Problem Relation Age of Onset    Hypertension Mother     Diabetes Sister     Diabetes Brother     Kidney disease Son        Social History     Social History    Marital status: Legally      Spouse name: N/A    Number of children: N/A    Years of education: N/A     Social History Main Topics    Smoking status: Former Smoker     Packs/day: 0.35     Years: 44.00     Types: Cigarettes     Quit date: 4/30/2015    Smokeless tobacco: Never Used      Comment: 1/08/2015    Alcohol use No    Drug use: No    Sexual activity: No     Other Topics Concern    None     Social History Narrative    None       Current Outpatient Prescriptions   Medication Sig Dispense Refill    albuterol-ipratropium 2.5mg-0.5mg/3mL (DUO-NEB) 0.5 mg-3 mg(2.5 mg base)/3 mL nebulizer solution Take 3 mLs by nebulization every 6 (six) hours while awake. Rescue 180 mL prn     amlodipine (NORVASC) 10 MG tablet Take 1 tablet (10 mg total) by mouth once daily. 90 tablet 3    ascorbic acid (VITAMIN C) 500 MG tablet Take 500 mg by mouth once daily.        aspirin (ECOTRIN) 81 MG EC tablet Take 81 mg by mouth once daily.        blood pressure monitor Kit 1 Units by Misc.(Non-Drug; Combo Route) route 2 (two) times daily. 1 each 0    calcium carbonate (OS-TASIA) 500 mg calcium (1,250 mg) tablet Take 1 tablet by mouth 2 (two) times daily.        ferrous sulfate 325 mg (65 mg iron) Tab tablet Take 1 tablet (325 mg total) by mouth 2 (two) times daily. 180 tablet 3    fish oil-omega-3 fatty acids 300-1,000 mg capsule Take 2 g by mouth every evening.       fluticasone (FLONASE) 50 mcg/actuation nasal spray 2 sprays by Each Nare route once daily. 48 g 3    gabapentin (NEURONTIN) 300 MG capsule Take 1 capsule (300 mg total) by mouth every evening. 90 capsule 3    guanFACINE (TENEX) 1 MG Tab TAKE ONE TABLET BY MOUTH IN THE EVENING 90 tablet 0    levothyroxine (SYNTHROID) 100 MCG tablet Take 1 tablet (100 mcg total) by mouth before breakfast. 90 tablet 3    losartan (COZAAR) 100 MG tablet Take 1 tablet (100 mg total) by mouth once daily. 90 tablet 3    magnesium oxide (MAG-OX) 400 mg tablet take by mouth once daily 90 tablet 3    montelukast (SINGULAIR) 10 mg tablet Take 1 tablet (10 mg total) by mouth every evening. 90 tablet 3    nitroGLYCERIN (NITROSTAT) 0.4 MG SL tablet Place 1 tablet (0.4 mg total) under the tongue every 5 (five) minutes as needed for Chest pain. As needed 30 tablet 3    predniSONE (DELTASONE) 20 MG tablet Take 20 mg by mouth once daily.      simvastatin (ZOCOR) 40 MG tablet TAKE ONE TABLET BY MOUTH ONCE DAILY IN THE EVENING 90 tablet 0    vitamin D 1000 units Tab Take 1 tablet (1,000 Units total) by mouth once daily. 90 tablet 3    cloNIDine (CATAPRES) 0.1 MG tablet Take 1 tablet (0.1 mg total) by mouth 2 (two) times daily. Take one tablet by mouth every hour as  needed for systolic blood pressure greater than 180.  Do not take more than 3 tablets in 24 hours. 90 tablet 3     No current facility-administered medications for this visit.        Review of patient's allergies indicates:   Allergen Reactions    Carvedilol Other (See Comments)     Bradycardia and syncope    Boniva [ibandronate]     Codeine     Hydralazine analogues     Iodinated contrast media - iv dye Hives    Lisinopril     Morphine          Review of Systems   Constitution: Negative for chills and fever.   Cardiovascular: Negative for claudication and leg swelling.   Skin: Positive for color change and nail changes.   Musculoskeletal: Positive for arthritis and joint pain.   Neurological: Positive for paresthesias. Negative for numbness.   Psychiatric/Behavioral: Negative for altered mental status.           Objective:      Physical Exam   Constitutional: She is oriented to person, place, and time. She appears well-developed and well-nourished. No distress.   Cardiovascular:   Pulses:       Dorsalis pedis pulses are 2+ on the right side, and 2+ on the left side.        Posterior tibial pulses are 1+ on the right side, and 1+ on the left side.   CFT <3 seconds bilateral.  Pedal hair growth decreased bilateral.  Varicosities noted bilateral.  Mild nonpitting edema noted bilateral.  Toes are cool to touch bilateral.     Musculoskeletal: She exhibits edema. She exhibits no tenderness.   Muscle strength 5/5 in all muscle groups bilateral.  No tenderness nor crepitation with ROM of foot/ankle joints bilateral.  No tenderness with palpation of bilateral foot and ankle.  Bilateral pes planus.  Bilateral hallux abducto valgus.  Bilateral semi-rigid contracture of toes 2-5.     Neurological: She is alert and oriented to person, place, and time. She has normal strength. A sensory deficit is present.   Protective sensation per [place] monofilament intact bilateral.    Vibratory sensation decreased  bilateral.    Light touch intact bilateral.   Skin: Skin is warm, dry and intact. Lesion noted. No abrasion, no bruising, no burn, no ecchymosis, no laceration, no petechiae and no rash noted. She is not diaphoretic. No cyanosis or erythema. No pallor. Nails show no clubbing.   Xerosis of pedal skin bilateral.  Pedal skin appears thin and atrophic bilateral. Toenails x 10 appear thickened by 3 mm's, elongated by 3 mm's, and discolored with subungual debris.  Hemosiderin staining noted to bilateral lower extremity.  Focal hyperkeratotic lesion noted to the plantar lateral midfoot on the Lt.  Examination of the skin reveals no evidence of significant maceration, rashes, open lesions, suspicious appearing nevi or other concerning lesions.              Assessment:       Encounter Diagnoses   Name Primary?    Diabetic polyneuropathy associated with type 2 diabetes mellitus Yes    Onychomycosis due to dermatophyte     Hammer toes of both feet          Plan:       Blaire was seen today for diabetic foot exam and routine foot care.    Diagnoses and all orders for this visit:    Diabetic polyneuropathy associated with type 2 diabetes mellitus    Onychomycosis due to dermatophyte    Hammer toes of both feet      I counseled the patient on her conditions, their implications and medical management.    Advised to continue wearing diabetic shoes as they accommodate for digital deformities.      Shoe inspection. Diabetic Foot Education. Patient reminded of the importance of good nutrition and blood sugar control to help prevent podiatric complications of diabetes. Patient instructed on proper foot hygeine. We discussed wearing proper shoe gear, daily foot inspections, never walking without protective shoe gear, never putting sharp instruments to feet    With patient's permission, nails were aggressively reduced and debrided x 10 to their soft tissue attachment mechanically and with electric , removing all offending nail  and debris.  Also, a sterile # 15 scalpel was used to trim lesion x 1 down to smooth appearing skin without incident.  Patient relates relief following the procedure. She will continue to monitor the areas daily, inspect her feet, wear protective shoe gear when ambulatory, moisturizer to maintain skin integrity and follow in this office in approximately 2-3 months, sooner p.r.n.    Follow up 3 months routine care    Emerson Chan DPM

## 2018-06-06 DIAGNOSIS — I11.9 HYPERTENSIVE LEFT VENTRICULAR HYPERTROPHY, WITHOUT HEART FAILURE: ICD-10-CM

## 2018-06-06 DIAGNOSIS — I51.89 LEFT VENTRICULAR DIASTOLIC DYSFUNCTION WITH PRESERVED SYSTOLIC FUNCTION: ICD-10-CM

## 2018-06-07 RX ORDER — GUANFACINE 1 MG/1
TABLET ORAL
Qty: 90 TABLET | Refills: 3 | Status: ON HOLD | OUTPATIENT
Start: 2018-06-07 | End: 2018-10-04 | Stop reason: HOSPADM

## 2018-06-08 ENCOUNTER — OFFICE VISIT (OUTPATIENT)
Dept: FAMILY MEDICINE | Facility: CLINIC | Age: 83
End: 2018-06-08
Payer: MEDICARE

## 2018-06-08 ENCOUNTER — HOSPITAL ENCOUNTER (INPATIENT)
Facility: HOSPITAL | Age: 83
LOS: 2 days | Discharge: HOME | DRG: 176 | End: 2018-06-10
Attending: EMERGENCY MEDICINE | Admitting: INTERNAL MEDICINE
Payer: MEDICARE

## 2018-06-08 ENCOUNTER — DOCUMENTATION ONLY (OUTPATIENT)
Dept: FAMILY MEDICINE | Facility: CLINIC | Age: 83
End: 2018-06-08

## 2018-06-08 ENCOUNTER — HOSPITAL ENCOUNTER (OUTPATIENT)
Dept: RADIOLOGY | Facility: CLINIC | Age: 83
Discharge: HOME | End: 2018-06-08
Attending: PHYSICIAN ASSISTANT
Payer: MEDICARE

## 2018-06-08 VITALS
SYSTOLIC BLOOD PRESSURE: 124 MMHG | DIASTOLIC BLOOD PRESSURE: 53 MMHG | RESPIRATION RATE: 16 BRPM | TEMPERATURE: 98 F | WEIGHT: 134.94 LBS | OXYGEN SATURATION: 95 % | HEART RATE: 69 BPM | BODY MASS INDEX: 23.04 KG/M2 | HEIGHT: 64 IN

## 2018-06-08 DIAGNOSIS — I26.99 OTHER ACUTE PULMONARY EMBOLISM WITHOUT ACUTE COR PULMONALE: Primary | ICD-10-CM

## 2018-06-08 DIAGNOSIS — M25.471 BILATERAL SWELLING OF FEET AND ANKLES: Primary | ICD-10-CM

## 2018-06-08 DIAGNOSIS — R06.02 SOB (SHORTNESS OF BREATH): ICD-10-CM

## 2018-06-08 DIAGNOSIS — J44.1 COPD WITH ACUTE EXACERBATION: Primary | ICD-10-CM

## 2018-06-08 DIAGNOSIS — M25.474 BILATERAL SWELLING OF FEET AND ANKLES: Primary | ICD-10-CM

## 2018-06-08 DIAGNOSIS — F32.A DEPRESSION, UNSPECIFIED DEPRESSION TYPE: ICD-10-CM

## 2018-06-08 DIAGNOSIS — J44.1 COPD EXACERBATION: ICD-10-CM

## 2018-06-08 DIAGNOSIS — E11.59 HYPERTENSION ASSOCIATED WITH DIABETES: ICD-10-CM

## 2018-06-08 DIAGNOSIS — J44.1 COPD WITH ACUTE EXACERBATION: ICD-10-CM

## 2018-06-08 DIAGNOSIS — M25.475 BILATERAL SWELLING OF FEET AND ANKLES: Primary | ICD-10-CM

## 2018-06-08 DIAGNOSIS — N18.30 TYPE 2 DIABETES MELLITUS WITH STAGE 3 CHRONIC KIDNEY DISEASE, WITHOUT LONG-TERM CURRENT USE OF INSULIN: ICD-10-CM

## 2018-06-08 DIAGNOSIS — R06.02 SHORTNESS OF BREATH: ICD-10-CM

## 2018-06-08 DIAGNOSIS — J44.89 ASTHMA-COPD OVERLAP SYNDROME: ICD-10-CM

## 2018-06-08 DIAGNOSIS — M25.472 BILATERAL SWELLING OF FEET AND ANKLES: Primary | ICD-10-CM

## 2018-06-08 DIAGNOSIS — J82.83 EOSINOPHILIC ASTHMA: ICD-10-CM

## 2018-06-08 DIAGNOSIS — I15.2 HYPERTENSION ASSOCIATED WITH DIABETES: ICD-10-CM

## 2018-06-08 DIAGNOSIS — R07.9 CHEST PAIN: ICD-10-CM

## 2018-06-08 DIAGNOSIS — E11.22 TYPE 2 DIABETES MELLITUS WITH STAGE 3 CHRONIC KIDNEY DISEASE, WITHOUT LONG-TERM CURRENT USE OF INSULIN: ICD-10-CM

## 2018-06-08 LAB
ALBUMIN SERPL BCP-MCNC: 3.3 G/DL
ALP SERPL-CCNC: 74 U/L
ALT SERPL W/O P-5'-P-CCNC: 10 U/L
ANION GAP SERPL CALC-SCNC: 10 MMOL/L
AST SERPL-CCNC: 16 U/L
BACTERIA #/AREA URNS HPF: ABNORMAL /HPF
BASOPHILS # BLD AUTO: 0 K/UL
BASOPHILS NFR BLD: 0.4 %
BILIRUB SERPL-MCNC: 0.2 MG/DL
BILIRUB UR QL STRIP: NEGATIVE
BNP SERPL-MCNC: 190 PG/ML
BUN SERPL-MCNC: 36 MG/DL
CALCIUM SERPL-MCNC: 8.8 MG/DL
CHLORIDE SERPL-SCNC: 105 MMOL/L
CLARITY UR: CLEAR
CO2 SERPL-SCNC: 22 MMOL/L
COLOR UR: YELLOW
CREAT SERPL-MCNC: 1.6 MG/DL
D DIMER PPP IA.FEU-MCNC: 1.72 MG/L FEU
DIFFERENTIAL METHOD: ABNORMAL
EOSINOPHIL # BLD AUTO: 0.3 K/UL
EOSINOPHIL NFR BLD: 4.7 %
ERYTHROCYTE [DISTWIDTH] IN BLOOD BY AUTOMATED COUNT: 13.1 %
EST. GFR  (AFRICAN AMERICAN): 33 ML/MIN/1.73 M^2
EST. GFR  (NON AFRICAN AMERICAN): 29 ML/MIN/1.73 M^2
GLUCOSE SERPL-MCNC: 228 MG/DL
GLUCOSE UR QL STRIP: NEGATIVE
HCT VFR BLD AUTO: 28.3 %
HGB BLD-MCNC: 9.5 G/DL
HGB UR QL STRIP: ABNORMAL
KETONES UR QL STRIP: NEGATIVE
LEUKOCYTE ESTERASE UR QL STRIP: ABNORMAL
LYMPHOCYTES # BLD AUTO: 1.3 K/UL
LYMPHOCYTES NFR BLD: 19.5 %
MCH RBC QN AUTO: 32.7 PG
MCHC RBC AUTO-ENTMCNC: 33.7 G/DL
MCV RBC AUTO: 97 FL
MICROSCOPIC COMMENT: ABNORMAL
MONOCYTES # BLD AUTO: 0.4 K/UL
MONOCYTES NFR BLD: 5.8 %
NEUTROPHILS # BLD AUTO: 4.6 K/UL
NEUTROPHILS NFR BLD: 69.6 %
NITRITE UR QL STRIP: NEGATIVE
PH UR STRIP: 6 [PH] (ref 5–8)
PLATELET # BLD AUTO: 182 K/UL
PMV BLD AUTO: 8.1 FL
POTASSIUM SERPL-SCNC: 4.6 MMOL/L
PROT SERPL-MCNC: 6.2 G/DL
PROT UR QL STRIP: ABNORMAL
RBC # BLD AUTO: 2.92 M/UL
RBC #/AREA URNS HPF: 7 /HPF (ref 0–4)
SODIUM SERPL-SCNC: 137 MMOL/L
SP GR UR STRIP: <=1.005 (ref 1–1.03)
SQUAMOUS #/AREA URNS HPF: 5 /HPF
TROPONIN I SERPL DL<=0.01 NG/ML-MCNC: <0.006 NG/ML
TSH SERPL DL<=0.005 MIU/L-ACNC: 8.08 UIU/ML
URATE CRY URNS QL MICRO: ABNORMAL
URN SPEC COLLECT METH UR: ABNORMAL
UROBILINOGEN UR STRIP-ACNC: NEGATIVE EU/DL
WBC # BLD AUTO: 6.5 K/UL
WBC #/AREA URNS HPF: >100 /HPF (ref 0–5)

## 2018-06-08 PROCEDURE — 99284 EMERGENCY DEPT VISIT MOD MDM: CPT | Mod: 25

## 2018-06-08 PROCEDURE — 94640 AIRWAY INHALATION TREATMENT: CPT

## 2018-06-08 PROCEDURE — 99999 PR PBB SHADOW E&M-EST. PATIENT-LVL V: CPT | Mod: PBBFAC,,, | Performed by: PHYSICIAN ASSISTANT

## 2018-06-08 PROCEDURE — 84443 ASSAY THYROID STIM HORMONE: CPT

## 2018-06-08 PROCEDURE — 81000 URINALYSIS NONAUTO W/SCOPE: CPT

## 2018-06-08 PROCEDURE — 93010 ELECTROCARDIOGRAM REPORT: CPT | Mod: ,,, | Performed by: INTERNAL MEDICINE

## 2018-06-08 PROCEDURE — 63600175 PHARM REV CODE 636 W HCPCS: Performed by: EMERGENCY MEDICINE

## 2018-06-08 PROCEDURE — G0378 HOSPITAL OBSERVATION PER HR: HCPCS

## 2018-06-08 PROCEDURE — 84484 ASSAY OF TROPONIN QUANT: CPT | Mod: 91

## 2018-06-08 PROCEDURE — 36415 COLL VENOUS BLD VENIPUNCTURE: CPT

## 2018-06-08 PROCEDURE — 71046 X-RAY EXAM CHEST 2 VIEWS: CPT | Mod: 26,,, | Performed by: RADIOLOGY

## 2018-06-08 PROCEDURE — 87086 URINE CULTURE/COLONY COUNT: CPT

## 2018-06-08 PROCEDURE — 96372 THER/PROPH/DIAG INJ SC/IM: CPT

## 2018-06-08 PROCEDURE — 93005 ELECTROCARDIOGRAM TRACING: CPT

## 2018-06-08 PROCEDURE — 85025 COMPLETE CBC W/AUTO DIFF WBC: CPT

## 2018-06-08 PROCEDURE — 83880 ASSAY OF NATRIURETIC PEPTIDE: CPT

## 2018-06-08 PROCEDURE — 99214 OFFICE O/P EST MOD 30 MIN: CPT | Mod: S$GLB,,, | Performed by: PHYSICIAN ASSISTANT

## 2018-06-08 PROCEDURE — 71046 X-RAY EXAM CHEST 2 VIEWS: CPT | Mod: TC,FY,PO

## 2018-06-08 PROCEDURE — 83036 HEMOGLOBIN GLYCOSYLATED A1C: CPT

## 2018-06-08 PROCEDURE — 84439 ASSAY OF FREE THYROXINE: CPT

## 2018-06-08 PROCEDURE — 96374 THER/PROPH/DIAG INJ IV PUSH: CPT

## 2018-06-08 PROCEDURE — 84484 ASSAY OF TROPONIN QUANT: CPT

## 2018-06-08 PROCEDURE — 12000002 HC ACUTE/MED SURGE SEMI-PRIVATE ROOM

## 2018-06-08 PROCEDURE — 85379 FIBRIN DEGRADATION QUANT: CPT

## 2018-06-08 PROCEDURE — 25000242 PHARM REV CODE 250 ALT 637 W/ HCPCS: Performed by: EMERGENCY MEDICINE

## 2018-06-08 PROCEDURE — 80053 COMPREHEN METABOLIC PANEL: CPT

## 2018-06-08 RX ORDER — ONDANSETRON 2 MG/ML
4 INJECTION INTRAMUSCULAR; INTRAVENOUS EVERY 8 HOURS PRN
Status: DISCONTINUED | OUTPATIENT
Start: 2018-06-08 | End: 2018-06-10 | Stop reason: HOSPADM

## 2018-06-08 RX ORDER — ENOXAPARIN SODIUM 100 MG/ML
1 INJECTION SUBCUTANEOUS
Status: COMPLETED | OUTPATIENT
Start: 2018-06-08 | End: 2018-06-08

## 2018-06-08 RX ORDER — ACETAMINOPHEN 325 MG/1
650 TABLET ORAL EVERY 4 HOURS PRN
Status: DISCONTINUED | OUTPATIENT
Start: 2018-06-08 | End: 2018-06-10 | Stop reason: HOSPADM

## 2018-06-08 RX ORDER — IPRATROPIUM BROMIDE AND ALBUTEROL SULFATE 2.5; .5 MG/3ML; MG/3ML
3 SOLUTION RESPIRATORY (INHALATION)
Status: COMPLETED | OUTPATIENT
Start: 2018-06-08 | End: 2018-06-08

## 2018-06-08 RX ORDER — SODIUM CHLORIDE 0.9 % (FLUSH) 0.9 %
5 SYRINGE (ML) INJECTION
Status: DISCONTINUED | OUTPATIENT
Start: 2018-06-08 | End: 2018-06-10 | Stop reason: HOSPADM

## 2018-06-08 RX ORDER — SERTRALINE HYDROCHLORIDE 25 MG/1
25 TABLET, FILM COATED ORAL DAILY
Qty: 30 TABLET | Refills: 0 | Status: SHIPPED | OUTPATIENT
Start: 2018-06-08 | End: 2019-03-22 | Stop reason: CLARIF

## 2018-06-08 RX ORDER — ACETAMINOPHEN 325 MG/1
650 TABLET ORAL EVERY 8 HOURS PRN
Status: DISCONTINUED | OUTPATIENT
Start: 2018-06-08 | End: 2018-06-10 | Stop reason: HOSPADM

## 2018-06-08 RX ORDER — IPRATROPIUM BROMIDE AND ALBUTEROL SULFATE 2.5; .5 MG/3ML; MG/3ML
3 SOLUTION RESPIRATORY (INHALATION) EVERY 6 HOURS
Status: DISCONTINUED | OUTPATIENT
Start: 2018-06-09 | End: 2018-06-10 | Stop reason: HOSPADM

## 2018-06-08 RX ORDER — METHYLPREDNISOLONE SOD SUCC 125 MG
125 VIAL (EA) INJECTION
Status: COMPLETED | OUTPATIENT
Start: 2018-06-08 | End: 2018-06-08

## 2018-06-08 RX ADMIN — IPRATROPIUM BROMIDE AND ALBUTEROL SULFATE 3 ML: .5; 3 SOLUTION RESPIRATORY (INHALATION) at 08:06

## 2018-06-08 RX ADMIN — ENOXAPARIN SODIUM 60 MG: 100 INJECTION SUBCUTANEOUS at 10:06

## 2018-06-08 RX ADMIN — METHYLPREDNISOLONE SODIUM SUCCINATE 125 MG: 125 INJECTION, POWDER, FOR SOLUTION INTRAMUSCULAR; INTRAVENOUS at 08:06

## 2018-06-08 NOTE — PROGRESS NOTES
Subjective:       Patient ID: Blaire Cage is a 87 y.o. female.    Chief Complaint: Leg Swelling (X 1 week with bilateral foot and leg swelling, X 3-4 days with SOB)    HPI   Patient is an 87 year old female presenting to the clinic with concerns of several issues:  1) bilateral lower leg edema- present off an on for several months, L>R. Her cardiologist had mentioned amlodipine as a possible cuase int he passed. Patient also endorses worsening SOB for the last few weeks. She does have a diagnosis of COPD & does not use any inhalers.   2) COPD- gradually worsening SOB. She denies wheezing, but I can audibly hear the wheezing. She has seen Dr. Christopher in the past. She was given abx and prednisone to use for flare ups. She has not used this yet. Last apt 12/2017.  3) Depression- has been under a lot of stress with home stressors- AC repair, termite damage, financial strain from both. She also gets upset around time of her son's death.   Review of Systems   Constitutional: Negative for activity change, appetite change, chills, diaphoresis, fatigue and fever.   HENT: Negative for congestion, postnasal drip and rhinorrhea.    Respiratory: Negative.  Negative for cough, shortness of breath and wheezing.    Cardiovascular: Negative.  Negative for chest pain.   Gastrointestinal: Negative for abdominal pain, blood in stool, constipation, diarrhea, nausea and vomiting.   Genitourinary: Negative for dysuria, frequency, hematuria and urgency.   Musculoskeletal: Negative.    Skin: Negative.  Negative for color change and rash.   Neurological: Negative for dizziness and syncope.   Psychiatric/Behavioral: Negative for agitation, behavioral problems and confusion.       Objective:      Physical Exam   Constitutional: Vital signs are normal. She appears well-developed and well-nourished. No distress.   Cardiovascular: Normal rate, regular rhythm, S1 normal, S2 normal and normal heart sounds.  Exam reveals no gallop.    No murmur  heard.  Pulses:       Radial pulses are 2+ on the right side, and 2+ on the left side.   Pulmonary/Chest: Effort normal. No respiratory distress. She has no decreased breath sounds. She has no wheezes. She has rhonchi in the right lower field and the left lower field.   Skin: Skin is warm and dry. She is not diaphoretic.   Appropriate skin turgor   Psychiatric: She has a normal mood and affect. Her speech is normal and behavior is normal. Judgment and thought content normal. Cognition and memory are normal.       Assessment:       1. Bilateral swelling of feet and ankles    2. COPD exacerbation    3. Depression, unspecified depression type    4. Hypertension associated with diabetes    5. Type 2 diabetes mellitus with stage 3 chronic kidney disease, without long-term current use of insulin        Plan:       Blaire was seen today for leg swelling.    Diagnoses and all orders for this visit:    Bilateral swelling of feet and ankles  -     Brain natriuretic peptide; Future  -     Basic metabolic panel; Future  May consider decreasing amlodipine to 5mg daily & monitor improvement and blood pressure.  Echocardiogram up to date; I doubt CHF    COPD exacerbation  Uncontrolled  Start prednisone 20mg daily x 3 days  Start trelogy inhaler daily  -     X-Ray Chest PA And Lateral; Future  3 week f/u     Depression, unspecified depression type  -     sertraline (ZOLOFT) 25 MG tablet; Take 1 tablet (25 mg total) by mouth once daily.  3 week f/u  I've explained to her that drugs of the SSRI class can have side effects such as weight gain, sexual dysfunction, insomnia, headache, nausea. These medications are generally effective at alleviating symptoms of anxiety and/or depression. Let me know if significant side effects do occur.      Hypertension associated with diabetes  Well controlled  No BP medication adjustments needed    Type 2 diabetes mellitus with stage 3 chronic kidney disease, without long-term current use of  insulin  Patient readiness: acceptance and barriers:none    During the course of the visit the patient was educated and counseled about the following:     Diabetes:  Discussed general issues about diabetes pathophysiology and management.  Hypertension:   Medication: no change.    Goals: Diabetes: Maintain Hemoglobin A1C below 7 and Hypertension: Reduce Blood Pressure    Did patient meet goals/outcomes: No    The following self management tools provided: declined    Patient Instructions (the written plan) was given to the patient/family.     Time spent with patient: 30 minutes    Barriers to medications present (no )    Adverse reactions to current medications (no)    Over the counter medications reviewed (Yes)

## 2018-06-08 NOTE — PROGRESS NOTES
Pre-Visit Chart Review  For Appointment Scheduled on 06/08/18    Health Maintenance Due   Topic Date Due    TETANUS VACCINE  07/21/1948    Eye Exam  10/30/2016    Foot Exam  11/29/2017    Lipid Panel  05/23/2018

## 2018-06-09 PROBLEM — R06.02 SOB (SHORTNESS OF BREATH): Status: ACTIVE | Noted: 2018-06-09

## 2018-06-09 PROBLEM — J44.89 ASTHMA-COPD OVERLAP SYNDROME: Status: ACTIVE | Noted: 2018-06-09

## 2018-06-09 PROBLEM — R06.02 SHORTNESS OF BREATH: Status: ACTIVE | Noted: 2018-06-09

## 2018-06-09 PROBLEM — E11.9 CONTROLLED TYPE 2 DIABETES MELLITUS, WITHOUT LONG-TERM CURRENT USE OF INSULIN: Status: ACTIVE | Noted: 2018-06-09

## 2018-06-09 PROBLEM — J82.83 EOSINOPHILIC ASTHMA: Status: ACTIVE | Noted: 2018-06-09

## 2018-06-09 PROBLEM — I26.99 ACUTE PULMONARY EMBOLISM: Status: ACTIVE | Noted: 2018-06-09

## 2018-06-09 LAB
ESTIMATED AVG GLUCOSE: 134 MG/DL
HBA1C MFR BLD HPLC: 6.3 %
POCT GLUCOSE: 144 MG/DL (ref 70–110)
POCT GLUCOSE: 160 MG/DL (ref 70–110)
POCT GLUCOSE: 162 MG/DL (ref 70–110)
POCT GLUCOSE: 162 MG/DL (ref 70–110)
POCT GLUCOSE: 213 MG/DL (ref 70–110)
T4 FREE SERPL-MCNC: 0.74 NG/DL
TROPONIN I SERPL DL<=0.01 NG/ML-MCNC: <0.006 NG/ML
TROPONIN I SERPL DL<=0.01 NG/ML-MCNC: <0.006 NG/ML

## 2018-06-09 PROCEDURE — 82962 GLUCOSE BLOOD TEST: CPT

## 2018-06-09 PROCEDURE — 25000003 PHARM REV CODE 250: Performed by: NURSE PRACTITIONER

## 2018-06-09 PROCEDURE — 94640 AIRWAY INHALATION TREATMENT: CPT

## 2018-06-09 PROCEDURE — 63600175 PHARM REV CODE 636 W HCPCS: Performed by: NURSE PRACTITIONER

## 2018-06-09 PROCEDURE — 25000242 PHARM REV CODE 250 ALT 637 W/ HCPCS: Performed by: NURSE PRACTITIONER

## 2018-06-09 PROCEDURE — 36415 COLL VENOUS BLD VENIPUNCTURE: CPT

## 2018-06-09 PROCEDURE — 11000001 HC ACUTE MED/SURG PRIVATE ROOM

## 2018-06-09 PROCEDURE — 99223 1ST HOSP IP/OBS HIGH 75: CPT | Mod: ,,, | Performed by: INTERNAL MEDICINE

## 2018-06-09 PROCEDURE — 94761 N-INVAS EAR/PLS OXIMETRY MLT: CPT

## 2018-06-09 PROCEDURE — 84484 ASSAY OF TROPONIN QUANT: CPT

## 2018-06-09 RX ORDER — LANOLIN ALCOHOL/MO/W.PET/CERES
400 CREAM (GRAM) TOPICAL DAILY
Status: DISCONTINUED | OUTPATIENT
Start: 2018-06-09 | End: 2018-06-10 | Stop reason: HOSPADM

## 2018-06-09 RX ORDER — LEVOTHYROXINE SODIUM 100 UG/1
100 TABLET ORAL
Status: DISCONTINUED | OUTPATIENT
Start: 2018-06-09 | End: 2018-06-10 | Stop reason: HOSPADM

## 2018-06-09 RX ORDER — GABAPENTIN 300 MG/1
300 CAPSULE ORAL NIGHTLY
Status: DISCONTINUED | OUTPATIENT
Start: 2018-06-09 | End: 2018-06-10 | Stop reason: HOSPADM

## 2018-06-09 RX ORDER — MONTELUKAST SODIUM 10 MG/1
10 TABLET ORAL NIGHTLY
Status: DISCONTINUED | OUTPATIENT
Start: 2018-06-09 | End: 2018-06-10 | Stop reason: HOSPADM

## 2018-06-09 RX ORDER — IBUPROFEN 200 MG
16 TABLET ORAL
Status: DISCONTINUED | OUTPATIENT
Start: 2018-06-09 | End: 2018-06-10 | Stop reason: HOSPADM

## 2018-06-09 RX ORDER — AMLODIPINE BESYLATE 5 MG/1
10 TABLET ORAL DAILY
Status: DISCONTINUED | OUTPATIENT
Start: 2018-06-09 | End: 2018-06-10 | Stop reason: HOSPADM

## 2018-06-09 RX ORDER — GUANFACINE 1 MG/1
1 TABLET ORAL NIGHTLY
Status: DISCONTINUED | OUTPATIENT
Start: 2018-06-09 | End: 2018-06-10 | Stop reason: HOSPADM

## 2018-06-09 RX ORDER — PREDNISONE 20 MG/1
TABLET ORAL
Qty: 36 TABLET | Refills: 1 | Status: SHIPPED | OUTPATIENT
Start: 2018-06-09 | End: 2018-06-27

## 2018-06-09 RX ORDER — INSULIN ASPART 100 [IU]/ML
0-5 INJECTION, SOLUTION INTRAVENOUS; SUBCUTANEOUS
Status: DISCONTINUED | OUTPATIENT
Start: 2018-06-09 | End: 2018-06-10 | Stop reason: HOSPADM

## 2018-06-09 RX ORDER — GLUCAGON 1 MG
1 KIT INJECTION
Status: DISCONTINUED | OUTPATIENT
Start: 2018-06-09 | End: 2018-06-10 | Stop reason: HOSPADM

## 2018-06-09 RX ORDER — SERTRALINE HYDROCHLORIDE 25 MG/1
25 TABLET, FILM COATED ORAL DAILY
Status: DISCONTINUED | OUTPATIENT
Start: 2018-06-09 | End: 2018-06-10 | Stop reason: HOSPADM

## 2018-06-09 RX ORDER — IBUPROFEN 200 MG
24 TABLET ORAL
Status: DISCONTINUED | OUTPATIENT
Start: 2018-06-09 | End: 2018-06-10 | Stop reason: HOSPADM

## 2018-06-09 RX ORDER — LOSARTAN POTASSIUM 25 MG/1
100 TABLET ORAL DAILY
Status: DISCONTINUED | OUTPATIENT
Start: 2018-06-09 | End: 2018-06-10 | Stop reason: HOSPADM

## 2018-06-09 RX ORDER — PREDNISONE 20 MG/1
20 TABLET ORAL DAILY
Status: DISCONTINUED | OUTPATIENT
Start: 2018-06-09 | End: 2018-06-10 | Stop reason: HOSPADM

## 2018-06-09 RX ORDER — SIMVASTATIN 40 MG/1
40 TABLET, FILM COATED ORAL NIGHTLY
Status: DISCONTINUED | OUTPATIENT
Start: 2018-06-09 | End: 2018-06-10 | Stop reason: HOSPADM

## 2018-06-09 RX ORDER — FLUTICASONE PROPIONATE 50 MCG
2 SPRAY, SUSPENSION (ML) NASAL DAILY
Status: DISCONTINUED | OUTPATIENT
Start: 2018-06-09 | End: 2018-06-10 | Stop reason: HOSPADM

## 2018-06-09 RX ORDER — ASCORBIC ACID 500 MG
500 TABLET ORAL DAILY
Status: DISCONTINUED | OUTPATIENT
Start: 2018-06-09 | End: 2018-06-10 | Stop reason: HOSPADM

## 2018-06-09 RX ORDER — CALCIUM CARBONATE 500(1250)
500 TABLET ORAL 2 TIMES DAILY
Status: DISCONTINUED | OUTPATIENT
Start: 2018-06-09 | End: 2018-06-10 | Stop reason: HOSPADM

## 2018-06-09 RX ORDER — ASPIRIN 81 MG/1
81 TABLET ORAL DAILY
Status: DISCONTINUED | OUTPATIENT
Start: 2018-06-09 | End: 2018-06-10 | Stop reason: HOSPADM

## 2018-06-09 RX ORDER — FERROUS SULFATE 325(65) MG
325 TABLET, DELAYED RELEASE (ENTERIC COATED) ORAL DAILY
Status: DISCONTINUED | OUTPATIENT
Start: 2018-06-09 | End: 2018-06-10 | Stop reason: HOSPADM

## 2018-06-09 RX ORDER — AMOXICILLIN 250 MG
1 CAPSULE ORAL 2 TIMES DAILY PRN
Status: DISCONTINUED | OUTPATIENT
Start: 2018-06-09 | End: 2018-06-10 | Stop reason: HOSPADM

## 2018-06-09 RX ORDER — CHOLECALCIFEROL (VITAMIN D3) 25 MCG
1000 TABLET ORAL DAILY
Status: DISCONTINUED | OUTPATIENT
Start: 2018-06-09 | End: 2018-06-10 | Stop reason: HOSPADM

## 2018-06-09 RX ADMIN — FLUTICASONE PROPIONATE 100 MCG: 50 SPRAY, METERED NASAL at 10:06

## 2018-06-09 RX ADMIN — IPRATROPIUM BROMIDE AND ALBUTEROL SULFATE 3 ML: .5; 3 SOLUTION RESPIRATORY (INHALATION) at 12:06

## 2018-06-09 RX ADMIN — STANDARDIZED SENNA CONCENTRATE AND DOCUSATE SODIUM 1 TABLET: 8.6; 5 TABLET, FILM COATED ORAL at 11:06

## 2018-06-09 RX ADMIN — IPRATROPIUM BROMIDE AND ALBUTEROL SULFATE 3 ML: .5; 3 SOLUTION RESPIRATORY (INHALATION) at 07:06

## 2018-06-09 RX ADMIN — MONTELUKAST SODIUM 10 MG: 10 TABLET, FILM COATED ORAL at 01:06

## 2018-06-09 RX ADMIN — ASPIRIN 81 MG: 81 TABLET, COATED ORAL at 10:06

## 2018-06-09 RX ADMIN — APIXABAN 5 MG: 2.5 TABLET, FILM COATED ORAL at 09:06

## 2018-06-09 RX ADMIN — Medication 500 MG: at 09:06

## 2018-06-09 RX ADMIN — VITAMIN D, TAB 1000IU (100/BT) 1000 UNITS: 25 TAB at 10:06

## 2018-06-09 RX ADMIN — LEVOTHYROXINE SODIUM 100 MCG: 100 TABLET ORAL at 06:06

## 2018-06-09 RX ADMIN — INSULIN ASPART 2 UNITS: 100 INJECTION, SOLUTION INTRAVENOUS; SUBCUTANEOUS at 01:06

## 2018-06-09 RX ADMIN — MONTELUKAST SODIUM 10 MG: 10 TABLET, FILM COATED ORAL at 09:06

## 2018-06-09 RX ADMIN — SIMVASTATIN 40 MG: 40 TABLET, FILM COATED ORAL at 09:06

## 2018-06-09 RX ADMIN — GABAPENTIN 300 MG: 300 CAPSULE ORAL at 09:06

## 2018-06-09 RX ADMIN — LOSARTAN POTASSIUM 100 MG: 25 TABLET ORAL at 10:06

## 2018-06-09 RX ADMIN — SERTRALINE 25 MG: 25 TABLET, FILM COATED ORAL at 10:06

## 2018-06-09 RX ADMIN — GUANFACINE HYDROCHLORIDE 1 MG: 1 TABLET ORAL at 09:06

## 2018-06-09 RX ADMIN — OXYCODONE HYDROCHLORIDE AND ACETAMINOPHEN 500 MG: 500 TABLET ORAL at 10:06

## 2018-06-09 RX ADMIN — SIMVASTATIN 40 MG: 40 TABLET, FILM COATED ORAL at 01:06

## 2018-06-09 RX ADMIN — Medication 500 MG: at 10:06

## 2018-06-09 RX ADMIN — Medication 400 MG: at 10:06

## 2018-06-09 RX ADMIN — FERROUS SULFATE TAB EC 325 MG (65 MG FE EQUIVALENT) 325 MG: 325 (65 FE) TABLET DELAYED RESPONSE at 10:06

## 2018-06-09 RX ADMIN — GABAPENTIN 300 MG: 300 CAPSULE ORAL at 01:06

## 2018-06-09 RX ADMIN — PREDNISONE 20 MG: 20 TABLET ORAL at 10:06

## 2018-06-09 NOTE — PLAN OF CARE
Problem: Patient Care Overview  Goal: Plan of Care Review  Outcome: Ongoing (interventions implemented as appropriate)   06/09/18 0393   Coping/Psychosocial   Plan Of Care Reviewed With patient   AAOx4. NAD noted. VS monitored. Swallows meds whole. Up with stand by assist.BG monitored. Ambulated to BR tolerated well. Denies chest pain since admit. 2L nc. SR on tele, HR 68. Free of fall or injury. Call light in reach. Will continue to monitor.

## 2018-06-09 NOTE — ED NOTES
Pt resting awake and alert in bed.  Family at bedside.  Pt appears anxious though she denies any distress

## 2018-06-09 NOTE — H&P
"[facility] Hospital Medicine  History & Physical    Patient Name: Blaire Cage  MRN: [id number]  Admission Date: 6/8/2018  Attending Physician: Scotty Nelson MD   Primary Care Provider: Eli Edmonds MD         Patient information was obtained from patient and ER records.     Subjective:     Principal Problem:Shortness of breath    Chief Complaint:   Chief Complaint   Patient presents with    Shortness of Breath     worse today - seen by PCP today - CXR done at office        HPI: Pt presents with c/o SOB especially WEST. Hx of COPD but has never been this short of breath in the past. Used a breathing treatment today and states that it did help "for a little while" Pt states that she first noticed WEST ~1 week ago which is new. Denies any chest pain per se, c/o left shoulder pain also endorses palpitations while she is short of breath. Denies any cough. Denies any dizziness, blurred vision or headaches. Recent echo was done in April, which showed moderate concentric hypertrophy and grade I ventricular diastolic dysfunction.     Past Medical History:   Diagnosis Date    Anticoagulant long-term use     aspirin    COPD (chronic obstructive pulmonary disease)     COPD with acute exacerbation 1/9/2015    Diabetes mellitus type II     Encounter for blood transfusion     Hip arthritis 3/1/2016    Hyperlipidemia     Hypertension     Pneumonia of right lower lobe due to infectious organism 9/11/2017    Thyroid disease     hypothyroid       Past Surgical History:   Procedure Laterality Date    APPENDECTOMY      FRACTURE SURGERY      right hip     HERNIA REPAIR      groin    HYSTERECTOMY      VARICOSE VEIN SURGERY         Review of patient's allergies indicates:   Allergen Reactions    Carvedilol Other (See Comments)     Bradycardia and syncope    Boniva [ibandronate]     Codeine     Hydralazine analogues     Iodinated contrast- oral and iv dye Hives    Kionex [sodium " polystyrene sulfonate]     Lisinopril     Morphine        No current facility-administered medications on file prior to encounter.      Current Outpatient Prescriptions on File Prior to Encounter   Medication Sig    amlodipine (NORVASC) 10 MG tablet Take 1 tablet (10 mg total) by mouth once daily.    ascorbic acid (VITAMIN C) 500 MG tablet Take 500 mg by mouth once daily.      aspirin (ECOTRIN) 81 MG EC tablet Take 81 mg by mouth once daily.      calcium carbonate (OS-TASIA) 500 mg calcium (1,250 mg) tablet Take 1 tablet by mouth 2 (two) times daily.      ferrous sulfate 325 mg (65 mg iron) Tab tablet Take 1 tablet (325 mg total) by mouth 2 (two) times daily.    fish oil-omega-3 fatty acids 300-1,000 mg capsule Take 2 g by mouth every evening.     fluticasone (FLONASE) 50 mcg/actuation nasal spray 2 sprays by Each Nare route once daily.    fluticasone-umeclidin-vilanter 100-62.5-25 mcg DsDv Inhale 1 puff into the lungs once daily.    gabapentin (NEURONTIN) 300 MG capsule Take 1 capsule (300 mg total) by mouth every evening.    guanFACINE (TENEX) 1 MG Tab TAKE 1 TABLET BY MOUTH IN THE EVENING    levothyroxine (SYNTHROID) 100 MCG tablet Take 1 tablet (100 mcg total) by mouth before breakfast.    losartan (COZAAR) 100 MG tablet Take 1 tablet (100 mg total) by mouth once daily.    magnesium oxide (MAG-OX) 400 mg tablet take by mouth once daily    montelukast (SINGULAIR) 10 mg tablet Take 1 tablet (10 mg total) by mouth every evening.    nitroGLYCERIN (NITROSTAT) 0.4 MG SL tablet Place 1 tablet (0.4 mg total) under the tongue every 5 (five) minutes as needed for Chest pain. As needed    predniSONE (DELTASONE) 20 MG tablet Take 20 mg by mouth once daily.    simvastatin (ZOCOR) 40 MG tablet TAKE ONE TABLET BY MOUTH ONCE DAILY IN THE EVENING    vitamin D 1000 units Tab Take 1 tablet (1,000 Units total) by mouth once daily.    albuterol-ipratropium 2.5mg-0.5mg/3mL (DUO-NEB) 0.5 mg-3 mg(2.5 mg base)/3 mL  nebulizer solution Take 3 mLs by nebulization every 6 (six) hours while awake. Rescue    blood pressure monitor Kit 1 Units by Misc.(Non-Drug; Combo Route) route 2 (two) times daily.    cloNIDine (CATAPRES) 0.1 MG tablet Take 1 tablet (0.1 mg total) by mouth 2 (two) times daily. Take one tablet by mouth every hour as needed for systolic blood pressure greater than 180.  Do not take more than 3 tablets in 24 hours.    sertraline (ZOLOFT) 25 MG tablet Take 1 tablet (25 mg total) by mouth once daily.     Family History     Problem Relation (Age of Onset)    Diabetes Sister, Brother    Hypertension Mother    Kidney disease Son        Social History Main Topics    Smoking status: Former Smoker     Packs/day: 0.35     Years: 44.00     Types: Cigarettes     Quit date: 4/30/2015    Smokeless tobacco: Never Used      Comment: 1/08/2015    Alcohol use No    Drug use: No    Sexual activity: No     Review of Systems   Constitutional: Negative for activity change, appetite change, diaphoresis and fever.   HENT: Negative for congestion and facial swelling.    Eyes: Negative for redness and itching.   Respiratory: Positive for shortness of breath. Negative for cough, chest tightness and wheezing.    Cardiovascular: Positive for palpitations. Negative for chest pain and leg swelling.   Gastrointestinal: Negative for abdominal pain, constipation, diarrhea, nausea and vomiting.   Endocrine: Negative for cold intolerance and heat intolerance.   Genitourinary: Negative for difficulty urinating, dysuria, flank pain, frequency, hematuria and urgency.   Musculoskeletal: Negative for arthralgias, back pain, joint swelling, myalgias and neck pain.   Neurological: Negative for dizziness, syncope, weakness and headaches.   Psychiatric/Behavioral: Negative for agitation and confusion. The patient is not nervous/anxious.      Objective:     Vital Signs (Most Recent):  Temp: 98.6 °F (37 °C) (06/08/18 2303)  Pulse: 73 (06/09/18  0058)  Resp: 18 (06/09/18 0058)  BP: (!) 162/72 (06/08/18 2303)  SpO2: 96 % (06/09/18 0058) Vital Signs (24h Range):  Temp:  [97.8 °F (36.6 °C)-98.6 °F (37 °C)] 98.6 °F (37 °C)  Pulse:  [69-92] 73  Resp:  [16-22] 18  SpO2:  [87 %-100 %] 96 %  BP: (124-169)/(53-76) 162/72     Weight: 61.6 kg (135 lb 12.9 oz)  Body mass index is 24.84 kg/m².    Physical Exam   Constitutional: She is oriented to person, place, and time. She appears well-developed and well-nourished. No distress.   HENT:   Head: Normocephalic and atraumatic.   Mouth/Throat: Oropharynx is clear and moist.   Eyes: Conjunctivae are normal. Right eye exhibits no discharge. Left eye exhibits no discharge. No scleral icterus.   Neck: No JVD present. No thyromegaly present.   Cardiovascular: Normal rate and regular rhythm.  Exam reveals no gallop and no friction rub.    No murmur heard.  Pulmonary/Chest: Effort normal and breath sounds normal. No respiratory distress. She has no wheezes. She has no rales. She exhibits no tenderness.   Diminished bilateral posteriorly    Abdominal: Soft. She exhibits no distension. There is no tenderness. There is no rebound and no guarding.   Musculoskeletal: She exhibits no edema or tenderness.   Lymphadenopathy:     She has no cervical adenopathy.   Neurological: She is alert and oriented to person, place, and time. She displays normal reflexes. No cranial nerve deficit.   Skin: Skin is warm and dry. She is not diaphoretic.   Psychiatric: She has a normal mood and affect. Her behavior is normal. Judgment and thought content normal.   Vitals reviewed.          Significant Labs:   CBC:   Recent Labs  Lab 06/08/18 2027   WBC 6.50   HGB 9.5*   HCT 28.3*        CMP:   Recent Labs  Lab 06/08/18  1458 06/08/18 2027    137   K 4.8 4.6    105   CO2 24 22*   GLU 63* 228*   BUN 33* 36*   CREATININE 1.7* 1.6*   CALCIUM 9.1 8.8   PROT  --  6.2   ALBUMIN  --  3.3*   BILITOT  --  0.2   ALKPHOS  --  74   AST  --  16    ALT  --  10   ANIONGAP 10 10   EGFRNONAA 26.7* 29*     Troponin:   Recent Labs  Lab 06/08/18 2027 06/08/18  2336   TROPONINI <0.006 <0.006       Significant Imaging: I have reviewed and interpreted all pertinent imaging results/findings within the past 24 hours.    Assessment/Plan:     * Shortness of breath    Acute   supplemental oxygen  Unable to obtain CTA to evaluate for PE due to creatinine   Will treat with full dose lovenox and obtain VQ scan in AM  Trend cardiac enzymes          Controlled type 2 diabetes mellitus, without long-term current use of insulin    Currently not on any medications  Accuchecks with ISS          COPD (chronic obstructive pulmonary disease)    No evidence of exacerbation   Breathing treatments           Type 2 diabetes mellitus with stage 3 chronic kidney disease    Creatinine at baseline  Avoid nephrotoxins  Renal dose where appropriate   Will trend creatinine             VTE Risk Mitigation     None             Angie Marcus NP  Department of Hospital Medicine   [facility]

## 2018-06-09 NOTE — PROGRESS NOTES
06/09/18 1108   Home Oxygen Qualification   Room Air SpO2 At Rest 96 %   Room Air SpO2 on Exertion 96 %   SpO2 on Recovery 96 %   Recovery Heart Rate 95 bpm   Home O2 Eval Comments does not qualify at this time

## 2018-06-09 NOTE — HPI
"Pt presents with c/o SOB especially WEST. Hx of COPD but has never been this short of breath in the past. Used a breathing treatment today and states that it did help "for a little while" Pt states that she first noticed WEST ~1 week ago which is new. Denies any chest pain per se, c/o left shoulder pain also endorses palpitations while she is short of breath. Denies any cough. Denies any dizziness, blurred vision or headaches. Recent echo was done in April, which showed moderate concentric hypertrophy and grade I ventricular diastolic dysfunction.   "

## 2018-06-09 NOTE — ASSESSMENT & PLAN NOTE
Creatinine at baseline  Avoid nephrotoxins  Renal dose where appropriate   Will trend creatinine

## 2018-06-09 NOTE — PLAN OF CARE
Pt denied any discharge needs at the time of assessment.     06/09/18 1550   Discharge Assessment   Assessment Type Discharge Planning Assessment   Confirmed/corrected address and phone number on facesheet? Yes   Assessment information obtained from? Patient   Prior to hospitilization cognitive status: Alert/Oriented   Prior to hospitalization functional status: Independent   Current cognitive status: Alert/Oriented   Current Functional Status: Independent   Lives With alone   Able to Return to Prior Arrangements yes   Is patient able to care for self after discharge? Yes   Patient's perception of discharge disposition home or selfcare   Readmission Within The Last 30 Days no previous admission in last 30 days   Patient currently being followed by outpatient case management? No   Equipment Currently Used at Home nebulizer;glucometer  (blood pressure cuff)   Do you have any problems affording any of your prescribed medications? No   Is the patient taking medications as prescribed? yes   Does the patient have transportation home? Yes   Transportation Available car;family or friend will provide   Does the patient receive services at the [facility]? No   Discharge Plan A Home

## 2018-06-09 NOTE — NURSING
Pt to room via wheel chair. 2L nc. Denies chest pain, sob. VS monitored. Resting in bed comfortably. Will continue to monitor.

## 2018-06-09 NOTE — ED PROVIDER NOTES
"Encounter Date: 6/8/2018    SCRIBE #1 NOTE: I, Angelina Medina, am scribing for, and in the presence of, Dr. Carbone.       History     Chief Complaint   Patient presents with    Shortness of Breath     worse today - seen by PCP today - CXR done at office       06/08/2018 8:17 PM     Chief complaint: Shortness of Breath      Blaire Cage is a 87 y.o. female with hx of HTN, DM, COPD, and pneumonia who presents to the ED with complaints of SOB for a couple of days per nurse. Pt's nurse reports pt stated she "didn't feel good yesterday" and made an appointment for the pt to be seen by ELLIE Garg today. She reported to her nurse that she was not feeling "that bad" at the clinic but SOB worsened after visiting the clinic. Pt's nurse reports pt had a CXR at the clinic today with results of COPD exacerbation. Per nurse pt has some wheezing, ankle swelling, "stomach problem" yesterday, and headache. Pt states she has breathing treatments at home and just finished her breathing treatment. She denies taking antibiotics, having pneumonia, or being admitted recently. The nurse denies pt having O2 at home. Pt denies hx of CHF. No pertinent PSHx noted.         The history is provided by the patient and a caregiver.     Review of patient's allergies indicates:   Allergen Reactions    Carvedilol Other (See Comments)     Bradycardia and syncope    Boniva [ibandronate]     Codeine     Hydralazine analogues     Iodinated contrast- oral and iv dye Hives    Kionex [sodium polystyrene sulfonate]     Lisinopril     Morphine      Past Medical History:   Diagnosis Date    Anticoagulant long-term use     aspirin    COPD (chronic obstructive pulmonary disease)     COPD with acute exacerbation 1/9/2015    Diabetes mellitus type II     Encounter for blood transfusion     Hip arthritis 3/1/2016    Hyperlipidemia     Hypertension     Pneumonia of right lower lobe due to infectious organism 9/11/2017    Thyroid disease  "    hypothyroid     Past Surgical History:   Procedure Laterality Date    APPENDECTOMY      FRACTURE SURGERY      right hip     HERNIA REPAIR      groin    HYSTERECTOMY      VARICOSE VEIN SURGERY       Family History   Problem Relation Age of Onset    Hypertension Mother     Diabetes Sister     Diabetes Brother     Kidney disease Son      Social History   Substance Use Topics    Smoking status: Former Smoker     Packs/day: 0.35     Years: 44.00     Types: Cigarettes     Quit date: 4/30/2015    Smokeless tobacco: Never Used      Comment: 1/08/2015    Alcohol use No     Review of Systems   Constitutional: Negative for fever.   HENT: Negative for congestion.    Eyes: Negative for visual disturbance.   Respiratory: Positive for shortness of breath and wheezing.    Cardiovascular: Negative for chest pain.        + swelling (ankles)   Gastrointestinal: Negative for abdominal pain.   Genitourinary: Negative for dysuria.   Musculoskeletal: Negative for joint swelling.   Skin: Negative for rash.   Neurological: Positive for headaches. Negative for syncope.   Hematological: Does not bruise/bleed easily.   Psychiatric/Behavioral: Negative for confusion.       Physical Exam     Initial Vitals [06/08/18 2004]   BP Pulse Resp Temp SpO2   127/60 92 (!) 22 98 °F (36.7 °C) (!) 94 %      MAP       82.33         Physical Exam    Nursing note and vitals reviewed.  Constitutional: She appears well-nourished.   No acute distress.    HENT:   Head: Normocephalic and atraumatic.   Eyes: Conjunctivae and EOM are normal.   Neck: Normal range of motion. Neck supple. No thyroid mass present.   Cardiovascular: Normal rate, regular rhythm and normal heart sounds. Exam reveals no gallop and no friction rub.    No murmur heard.  Pulmonary/Chest: No respiratory distress. She has wheezes. She has no rhonchi. She has no rales.   Scant end expiratory wheezing at both lung bases.    Abdominal: Soft. Normal appearance and bowel sounds are  normal. There is no tenderness.   Neurological: She is alert and oriented to person, place, and time. She has normal strength. No cranial nerve deficit or sensory deficit.   Skin: Skin is warm and dry. No rash noted. No erythema.   Psychiatric: She has a normal mood and affect. Her speech is normal. Cognition and memory are normal.         ED Course   Procedures  Labs Reviewed   CBC W/ AUTO DIFFERENTIAL - Abnormal; Notable for the following:        Result Value    RBC 2.92 (*)     Hemoglobin 9.5 (*)     Hematocrit 28.3 (*)     MCH 32.7 (*)     MPV 8.1 (*)     All other components within normal limits   COMPREHENSIVE METABOLIC PANEL - Abnormal; Notable for the following:     CO2 22 (*)     Glucose 228 (*)     BUN, Bld 36 (*)     Creatinine 1.6 (*)     Albumin 3.3 (*)     eGFR if  33 (*)     eGFR if non  29 (*)     All other components within normal limits   B-TYPE NATRIURETIC PEPTIDE - Abnormal; Notable for the following:      (*)     All other components within normal limits   URINALYSIS - Abnormal; Notable for the following:     Specific Gravity, UA <=1.005 (*)     Protein, UA Trace (*)     Occult Blood UA 1+ (*)     Leukocytes, UA 1+ (*)     All other components within normal limits   D DIMER, QUANTITATIVE - Abnormal; Notable for the following:     D-Dimer 1.72 (*)     All other components within normal limits   URINALYSIS MICROSCOPIC - Abnormal; Notable for the following:     RBC, UA 7 (*)     WBC, UA >100 (*)     Bacteria, UA Few (*)     All other components within normal limits   CULTURE, URINE   TROPONIN I     EKG Readings: (Independently Interpreted)   Initial Reading: No STEMI. Heart Rate: 79 bpm. Axis: Left Axis Deviation.   Sinus Rhythm 1st Degree AV block.        X-Ray Chest AP Portable   Final Result      No interval detrimental change when compared to the prior study.         Electronically signed by: Aubrey Davis MD   Date:    06/08/2018   Time:    22:25      NM  Lung Ventilation Perfusion Imaging    (Results Pending)        Medical Decision Making:   History:   Old Medical Records: I decided to obtain old medical records.  Clinical Tests:   Lab Tests: Ordered and Reviewed  Radiological Study: Ordered and Reviewed  Medical Tests: Ordered and Reviewed  ED Management:  This patient was interviewed and examined emergently.  Initial vital signs are significant for mild hypoxia and tachypnea.  Patient was provided supplemental oxygen and respiratory care protocol with breathing treatments and IV Solu-Medrol.  Workup in the emergency department is negative for acute interval change with the exception of elevation of D-dimer to 1.7.  The D-dimer was obtained after the patient walked to the bathroom and began to experience worsening focal not reproducible left thorax pain with acutely worsening dyspnea on mild exertion. Patient has chronic kidney disease which excludes CT angiogram of the chest but I do think the patient warrants observation for further treatment, respiratory monitoring and nuclear medicine study to rule out thromboembolic disease.  The case was discussed with Mrs. Marcus who agreed to admit the patient.  Family updated and in agreement with plan of care and further observation.  She was transferred to an observation bed in guarded condition.            Scribe Attestation:   Scribe #1: I performed the above scribed service and the documentation accurately describes the services I performed. I attest to the accuracy of the note.    I, Dr. Gee Carbone, personally performed the services described in this documentation. All medical record entries made by the scribe were at my direction and in my presence.  I have reviewed the chart and agree that the record reflects my personal performance and is accurate and complete. Gee Carbone MD.  5:31 AM 06/09/2018             Clinical Impression:   Diagnoses of SOB (shortness of breath), Chest pain, and Shortness of breath  were pertinent to this visit.      Disposition:   Disposition: Placed in Observation  Condition: Gisel Carbone MD  06/09/18 0542

## 2018-06-09 NOTE — PROGRESS NOTES
No new issues this am. Patient denies shortness of breath and chest pain presently.   Reviewed results of VQ scan with demonstrated intermediate probability for the presence of pulmonary embolism. Will Continue Lovenox 1mg /kg.   Consult pulmonary. Patient known to Dr. Christopher.  Check home oxygen evaluation.   Check US of BLE. Unable to do CTA chest due to CKD stage 3

## 2018-06-09 NOTE — NURSING
rolando called pt telling her that the eliquis will not be able to be filled until Monday. Discharge plan for Sunday.  Called NP to make aware.

## 2018-06-09 NOTE — CONSULTS
06/09/2018      Admit Date: 6/8/2018  Blaire Cage  New Patient Consult    Chief Complaint   Patient presents with    Shortness of Breath     worse today - seen by PCP today - CXR done at office       History of Present Illness:   Pt ambulates slowly for fear of falls- no falls.  Gets outdoors regularly with smoke exposure.  Has been off smokes somewhat.  Pt had fev1 52% with high eos in past, no h/o asthma, used prednisone 2-3 times since seen last in December 2017.  She relates controller expensive and not used.    Pt had related no hospital or emergent eval.  She was given action plan and used 3+ times since December.  She exacerbated about a wk ago with sob, and took prednisone daily for 3 but did not resolve as was her usual response.  She then worsened presenting to NP with subsequent admit.  Pt's exacerbation was similar to prior but more intense.  She feels much better today.  No cough/mucous production , no n/v/d nor weakness, no chills or fever.   No leg or chest pain.        PFSH:  Past Medical History:   Diagnosis Date    Anticoagulant long-term use     aspirin    COPD (chronic obstructive pulmonary disease)     COPD with acute exacerbation 1/9/2015    Diabetes mellitus type II     Encounter for blood transfusion     Hip arthritis 3/1/2016    Hyperlipidemia     Hypertension     Pneumonia of right lower lobe due to infectious organism 9/11/2017    Thyroid disease     hypothyroid     Past Surgical History:   Procedure Laterality Date    APPENDECTOMY      FRACTURE SURGERY      right hip     HERNIA REPAIR      groin    HYSTERECTOMY      VARICOSE VEIN SURGERY       Social History   Substance Use Topics    Smoking status: Former Smoker     Packs/day: 0.35     Years: 44.00     Types: Cigarettes     Quit date: 4/30/2015    Smokeless tobacco: Never Used      Comment: 1/08/2015    Alcohol use No     Family History   Problem Relation Age of Onset    Hypertension Mother     Diabetes  "Sister     Diabetes Brother     Kidney disease Son      Review of patient's allergies indicates:   Allergen Reactions    Carvedilol Other (See Comments)     Bradycardia and syncope    Boniva [ibandronate]     Codeine     Hydralazine analogues     Iodinated contrast- oral and iv dye Hives    Kionex [sodium polystyrene sulfonate]     Lisinopril     Morphine        Performance Status:Performance Status:The patient's activity level is housebound activities.    Review of Systems:  a review of eleven systems covering constitutional, Psych, Eye, HEENT, Respiratory, Cardiac, GI, , Musculoskeletal, Endocrine, Dermatologicwas negative except the above mentioned abnormalities and for any pertinent findings as listed below:  pertinent positive as above, rest is good         Exam:Comprehensive exam done. BP (!) 179/74 (BP Location: Right arm, Patient Position: Lying)   Pulse 76   Temp 97.7 °F (36.5 °C) (Oral)   Resp 17   Ht 5' 2" (1.575 m)   Wt 61.6 kg (135 lb 12.9 oz)   LMP  (LMP Unknown)   SpO2 (!) 94%   Breastfeeding? No   BMI 24.84 kg/m²   Exam included Vitals as listed, and patient's appearance and affect and alertness and mood, oral exam for yeast and hygiene and pharynx lesions and Mallapatti (M) score, neck with inspection for jvd and masses and thyroid abnormalities and lymph nodes (supraclavicular and infraclavicular nodes also examined and noted if abn), chest exam included symmetry and effort and fremitus and percussion and auscultation, cardiac exam included rhythm and gallops and murmur and rubs and jvd and edema, abdominal exam for mass and hepatosplenomegaly and tenderness and hernias and bowel sounds, Musculoskeletal exam with muscle tone and posture and mobility/gait and  strenght, and skin for rashes and cyanosis and pallor and turgor, extremity for clubbing.  Findings were normal except as listed below:  M2, chest is symmetric, no distress, normal percussion, normal fremitus and good " normal breath sounds. RRR, no murmur or gallop or rub, no clubbing.    Radiographs reviewed: view by direct vision  cxr same as before - copd changes with NAD.  V/q  Low prob.  Results for orders placed during the hospital encounter of 10/14/17   X-Ray Chest 1 View    Narrative Portable AP view of the chest was obtained.  Comparison -- 9/11/17. The heart size is borderline enlarged. Mediastinum shows aortic atherosclerosis. The lungs are well expanded with eventrations noted along the diaphragms. Lungs are clear except minimal, patchy opacity at the right lower zone similar to before. This could represent atelectasis or airspace disease such as pneumonia or aspiration. Continued followup is recommended. No pleural fluid is seen. Clips are present at the right axilla.    Impression  Little change from prior exam. Continued minimal consolidation right lung base.      Electronically signed by: TIMOTHY ZIEGLER MD  Date:     10/14/17  Time:    17:13    ]    Labs       Recent Labs  Lab 06/08/18 2027   WBC 6.50   HGB 9.5*   HCT 28.3*      HGBA1C 6.3*     Recent Labs  Lab 06/08/18 2027 06/09/18  0514     --   --    K 4.6  --   --      --   --    CO2 22*  --   --    BUN 36*  --   --    CREATININE 1.6*  --   --    *  --   --    CALCIUM 8.8  --   --    AST 16  --   --    ALT 10  --   --    ALKPHOS 74  --   --    BILITOT 0.2  --   --    PROT 6.2  --   --    ALBUMIN 3.3*  --   --    TROPONINI <0.006  < > <0.006   *  --   --    < > = values in this interval not displayed.No results for input(s): PH, PCO2, PO2, HCO3 in the last 24 hours.  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       Plan:   June 9, with negative leg study may be good to dc on prednisone 60/d x 3 then 40/d x 3 then 20 /d x 3- she needs to follow up in office in 1-2 wks, needs to discuss smoke avoidance and needs controller.  singulair should work with eos.      Put in prednisone order.

## 2018-06-09 NOTE — ASSESSMENT & PLAN NOTE
Acute   supplemental oxygen  Unable to obtain CTA to evaluate for PE due to creatinine   Will treat with full dose lovenox and obtain VQ scan in AM  Trend cardiac enzymes

## 2018-06-09 NOTE — SUBJECTIVE & OBJECTIVE
Past Medical History:   Diagnosis Date    Anticoagulant long-term use     aspirin    COPD (chronic obstructive pulmonary disease)     COPD with acute exacerbation 1/9/2015    Diabetes mellitus type II     Encounter for blood transfusion     Hip arthritis 3/1/2016    Hyperlipidemia     Hypertension     Pneumonia of right lower lobe due to infectious organism 9/11/2017    Thyroid disease     hypothyroid       Past Surgical History:   Procedure Laterality Date    APPENDECTOMY      FRACTURE SURGERY      right hip     HERNIA REPAIR      groin    HYSTERECTOMY      VARICOSE VEIN SURGERY         Review of patient's allergies indicates:   Allergen Reactions    Carvedilol Other (See Comments)     Bradycardia and syncope    Boniva [ibandronate]     Codeine     Hydralazine analogues     Iodinated contrast- oral and iv dye Hives    Kionex [sodium polystyrene sulfonate]     Lisinopril     Morphine        No current facility-administered medications on file prior to encounter.      Current Outpatient Prescriptions on File Prior to Encounter   Medication Sig    amlodipine (NORVASC) 10 MG tablet Take 1 tablet (10 mg total) by mouth once daily.    ascorbic acid (VITAMIN C) 500 MG tablet Take 500 mg by mouth once daily.      aspirin (ECOTRIN) 81 MG EC tablet Take 81 mg by mouth once daily.      calcium carbonate (OS-TASIA) 500 mg calcium (1,250 mg) tablet Take 1 tablet by mouth 2 (two) times daily.      ferrous sulfate 325 mg (65 mg iron) Tab tablet Take 1 tablet (325 mg total) by mouth 2 (two) times daily.    fish oil-omega-3 fatty acids 300-1,000 mg capsule Take 2 g by mouth every evening.     fluticasone (FLONASE) 50 mcg/actuation nasal spray 2 sprays by Each Nare route once daily.    fluticasone-umeclidin-vilanter 100-62.5-25 mcg DsDv Inhale 1 puff into the lungs once daily.    gabapentin (NEURONTIN) 300 MG capsule Take 1 capsule (300 mg total) by mouth every evening.    guanFACINE (TENEX) 1 MG Tab  TAKE 1 TABLET BY MOUTH IN THE EVENING    levothyroxine (SYNTHROID) 100 MCG tablet Take 1 tablet (100 mcg total) by mouth before breakfast.    losartan (COZAAR) 100 MG tablet Take 1 tablet (100 mg total) by mouth once daily.    magnesium oxide (MAG-OX) 400 mg tablet take by mouth once daily    montelukast (SINGULAIR) 10 mg tablet Take 1 tablet (10 mg total) by mouth every evening.    nitroGLYCERIN (NITROSTAT) 0.4 MG SL tablet Place 1 tablet (0.4 mg total) under the tongue every 5 (five) minutes as needed for Chest pain. As needed    predniSONE (DELTASONE) 20 MG tablet Take 20 mg by mouth once daily.    simvastatin (ZOCOR) 40 MG tablet TAKE ONE TABLET BY MOUTH ONCE DAILY IN THE EVENING    vitamin D 1000 units Tab Take 1 tablet (1,000 Units total) by mouth once daily.    albuterol-ipratropium 2.5mg-0.5mg/3mL (DUO-NEB) 0.5 mg-3 mg(2.5 mg base)/3 mL nebulizer solution Take 3 mLs by nebulization every 6 (six) hours while awake. Rescue    blood pressure monitor Kit 1 Units by Misc.(Non-Drug; Combo Route) route 2 (two) times daily.    cloNIDine (CATAPRES) 0.1 MG tablet Take 1 tablet (0.1 mg total) by mouth 2 (two) times daily. Take one tablet by mouth every hour as needed for systolic blood pressure greater than 180.  Do not take more than 3 tablets in 24 hours.    sertraline (ZOLOFT) 25 MG tablet Take 1 tablet (25 mg total) by mouth once daily.     Family History     Problem Relation (Age of Onset)    Diabetes Sister, Brother    Hypertension Mother    Kidney disease Son        Social History Main Topics    Smoking status: Former Smoker     Packs/day: 0.35     Years: 44.00     Types: Cigarettes     Quit date: 4/30/2015    Smokeless tobacco: Never Used      Comment: 1/08/2015    Alcohol use No    Drug use: No    Sexual activity: No     Review of Systems   Constitutional: Negative for activity change, appetite change, diaphoresis and fever.   HENT: Negative for congestion and facial swelling.    Eyes:  Negative for redness and itching.   Respiratory: Positive for shortness of breath. Negative for cough, chest tightness and wheezing.    Cardiovascular: Positive for palpitations. Negative for chest pain and leg swelling.   Gastrointestinal: Negative for abdominal pain, constipation, diarrhea, nausea and vomiting.   Endocrine: Negative for cold intolerance and heat intolerance.   Genitourinary: Negative for difficulty urinating, dysuria, flank pain, frequency, hematuria and urgency.   Musculoskeletal: Negative for arthralgias, back pain, joint swelling, myalgias and neck pain.   Neurological: Negative for dizziness, syncope, weakness and headaches.   Psychiatric/Behavioral: Negative for agitation and confusion. The patient is not nervous/anxious.      Objective:     Vital Signs (Most Recent):  Temp: 98.6 °F (37 °C) (06/08/18 2303)  Pulse: 73 (06/09/18 0058)  Resp: 18 (06/09/18 0058)  BP: (!) 162/72 (06/08/18 2303)  SpO2: 96 % (06/09/18 0058) Vital Signs (24h Range):  Temp:  [97.8 °F (36.6 °C)-98.6 °F (37 °C)] 98.6 °F (37 °C)  Pulse:  [69-92] 73  Resp:  [16-22] 18  SpO2:  [87 %-100 %] 96 %  BP: (124-169)/(53-76) 162/72     Weight: 61.6 kg (135 lb 12.9 oz)  Body mass index is 24.84 kg/m².    Physical Exam   Constitutional: She is oriented to person, place, and time. She appears well-developed and well-nourished. No distress.   HENT:   Head: Normocephalic and atraumatic.   Mouth/Throat: Oropharynx is clear and moist.   Eyes: Conjunctivae are normal. Right eye exhibits no discharge. Left eye exhibits no discharge. No scleral icterus.   Neck: No JVD present. No thyromegaly present.   Cardiovascular: Normal rate and regular rhythm.  Exam reveals no gallop and no friction rub.    No murmur heard.  Pulmonary/Chest: Effort normal and breath sounds normal. No respiratory distress. She has no wheezes. She has no rales. She exhibits no tenderness.   Diminished bilateral posteriorly    Abdominal: Soft. She exhibits no distension.  There is no tenderness. There is no rebound and no guarding.   Musculoskeletal: She exhibits no edema or tenderness.   Lymphadenopathy:     She has no cervical adenopathy.   Neurological: She is alert and oriented to person, place, and time. She displays normal reflexes. No cranial nerve deficit.   Skin: Skin is warm and dry. She is not diaphoretic.   Psychiatric: She has a normal mood and affect. Her behavior is normal. Judgment and thought content normal.   Vitals reviewed.          Significant Labs:   CBC:   Recent Labs  Lab 06/08/18 2027   WBC 6.50   HGB 9.5*   HCT 28.3*        CMP:   Recent Labs  Lab 06/08/18  1458 06/08/18 2027    137   K 4.8 4.6    105   CO2 24 22*   GLU 63* 228*   BUN 33* 36*   CREATININE 1.7* 1.6*   CALCIUM 9.1 8.8   PROT  --  6.2   ALBUMIN  --  3.3*   BILITOT  --  0.2   ALKPHOS  --  74   AST  --  16   ALT  --  10   ANIONGAP 10 10   EGFRNONAA 26.7* 29*     Troponin:   Recent Labs  Lab 06/08/18 2027 06/08/18  2336   TROPONINI <0.006 <0.006       Significant Imaging: I have reviewed and interpreted all pertinent imaging results/findings within the past 24 hours.

## 2018-06-09 NOTE — PLAN OF CARE
Aerosol tx q6     06/09/18 0722   Patient Assessment/Suction   Level of Consciousness (AVPU) alert   Respiratory Effort Unlabored   Expansion/Accessory Muscles/Retractions expansion symmetric   All Lung Fields Breath Sounds diminished   PRE-TX-O2-ETCO2   O2 Device (Oxygen Therapy) room air   SpO2 (!) 94 %   Pulse Oximetry Type Intermittent   Pulse 66   Resp 16   Aerosol Therapy   $ Aerosol Therapy Charges Aerosol Treatment   Respiratory Treatment Status given   SVN/Inhaler Treatment Route mask   Position During Treatment HOB at 30 degrees   Patient Tolerance good   Post-Treatment   Post-treatment Heart Rate (beats/min) 69   Post-treatment Resp Rate (breaths/min) 18   All Fields Breath Sounds unchanged

## 2018-06-09 NOTE — PLAN OF CARE
Problem: Patient Care Overview  Goal: Plan of Care Review  Outcome: Ongoing (interventions implemented as appropriate)  Pt up to restroom per self. Pt gave herself a bath at bedside. Pt uses her phone to talk to friends and family. Pt was pleased to hear Dr Christopher would be taking care of her.

## 2018-06-09 NOTE — ED NOTES
Pt ambulated to bathroom and back and complained of an episode of shortness of breath and anxiety.  Family at bedside.  Placed on O2 though she continued to saturate at 94%.  Vitals remain stable.

## 2018-06-10 VITALS
BODY MASS INDEX: 24.99 KG/M2 | WEIGHT: 135.81 LBS | DIASTOLIC BLOOD PRESSURE: 69 MMHG | TEMPERATURE: 97 F | SYSTOLIC BLOOD PRESSURE: 156 MMHG | RESPIRATION RATE: 18 BRPM | HEIGHT: 62 IN | OXYGEN SATURATION: 93 % | HEART RATE: 62 BPM

## 2018-06-10 LAB
BACTERIA UR CULT: NORMAL
POCT GLUCOSE: 101 MG/DL (ref 70–110)
POCT GLUCOSE: 123 MG/DL (ref 70–110)

## 2018-06-10 PROCEDURE — 25000242 PHARM REV CODE 250 ALT 637 W/ HCPCS: Performed by: NURSE PRACTITIONER

## 2018-06-10 PROCEDURE — 99900035 HC TECH TIME PER 15 MIN (STAT)

## 2018-06-10 PROCEDURE — 25000003 PHARM REV CODE 250: Performed by: NURSE PRACTITIONER

## 2018-06-10 PROCEDURE — 63600175 PHARM REV CODE 636 W HCPCS: Performed by: NURSE PRACTITIONER

## 2018-06-10 PROCEDURE — 94761 N-INVAS EAR/PLS OXIMETRY MLT: CPT

## 2018-06-10 PROCEDURE — 94640 AIRWAY INHALATION TREATMENT: CPT

## 2018-06-10 RX ADMIN — FLUTICASONE PROPIONATE 100 MCG: 50 SPRAY, METERED NASAL at 10:06

## 2018-06-10 RX ADMIN — ASPIRIN 81 MG: 81 TABLET, COATED ORAL at 10:06

## 2018-06-10 RX ADMIN — Medication 400 MG: at 10:06

## 2018-06-10 RX ADMIN — SERTRALINE 25 MG: 25 TABLET, FILM COATED ORAL at 10:06

## 2018-06-10 RX ADMIN — PREDNISONE 20 MG: 20 TABLET ORAL at 10:06

## 2018-06-10 RX ADMIN — LEVOTHYROXINE SODIUM 100 MCG: 100 TABLET ORAL at 05:06

## 2018-06-10 RX ADMIN — APIXABAN 5 MG: 2.5 TABLET, FILM COATED ORAL at 10:06

## 2018-06-10 RX ADMIN — Medication 500 MG: at 10:06

## 2018-06-10 RX ADMIN — FERROUS SULFATE TAB EC 325 MG (65 MG FE EQUIVALENT) 325 MG: 325 (65 FE) TABLET DELAYED RESPONSE at 10:06

## 2018-06-10 RX ADMIN — IPRATROPIUM BROMIDE AND ALBUTEROL SULFATE 3 ML: .5; 3 SOLUTION RESPIRATORY (INHALATION) at 07:06

## 2018-06-10 RX ADMIN — LOSARTAN POTASSIUM 100 MG: 25 TABLET ORAL at 10:06

## 2018-06-10 RX ADMIN — OXYCODONE HYDROCHLORIDE AND ACETAMINOPHEN 500 MG: 500 TABLET ORAL at 10:06

## 2018-06-10 RX ADMIN — VITAMIN D, TAB 1000IU (100/BT) 1000 UNITS: 25 TAB at 10:06

## 2018-06-10 RX ADMIN — IPRATROPIUM BROMIDE AND ALBUTEROL SULFATE 3 ML: .5; 3 SOLUTION RESPIRATORY (INHALATION) at 01:06

## 2018-06-10 NOTE — PLAN OF CARE
Problem: Patient Care Overview  Goal: Plan of Care Review  Outcome: Ongoing (interventions implemented as appropriate)  Pt resting quietly with no signs of distress. Pt started Eliquis tonight to be taken twice daily. Bed low locked, side rails x 2, call light in reach. Will continue to monitor.

## 2018-06-10 NOTE — PLAN OF CARE
06/10/18 1318   Final Note   Assessment Type Final Discharge Note   Discharge Disposition Home

## 2018-06-10 NOTE — NURSING
Iv removed. Catheter intact. Tele removed. Pt discharged off floor via wheelchair with daughter by nurse.

## 2018-06-10 NOTE — PLAN OF CARE
provided patient with Eliquis discount card per GABRIELA Noel's request.     06/10/18 1050   Discharge Reassessment   Assessment Type Discharge Planning Reassessment

## 2018-06-11 ENCOUNTER — TELEPHONE (OUTPATIENT)
Dept: FAMILY MEDICINE | Facility: CLINIC | Age: 83
End: 2018-06-11

## 2018-06-11 ENCOUNTER — TELEPHONE (OUTPATIENT)
Dept: PULMONOLOGY | Facility: CLINIC | Age: 83
End: 2018-06-11

## 2018-06-11 ENCOUNTER — PATIENT OUTREACH (OUTPATIENT)
Dept: ADMINISTRATIVE | Facility: CLINIC | Age: 83
End: 2018-06-11

## 2018-06-11 ENCOUNTER — PATIENT MESSAGE (OUTPATIENT)
Dept: FAMILY MEDICINE | Facility: CLINIC | Age: 83
End: 2018-06-11

## 2018-06-11 ENCOUNTER — TELEPHONE (OUTPATIENT)
Dept: MEDSURG UNIT | Facility: HOSPITAL | Age: 83
End: 2018-06-11

## 2018-06-11 NOTE — DISCHARGE SUMMARY
"[facility] Medicine  Discharge Summary      Patient Name: Blaire Cgae  MRN: [id number]  Admission Date: 6/8/2018  Hospital Length of Stay: 1 days  Discharge Date and Time: 6/10/2018  1:59 PM  Attending Physician: No att. providers found   Discharging Provider: Hiram Saeed NP  Primary Care Provider: Eli Edmonds MD      HPI:   Pt presents with c/o SOB especially WEST. Hx of COPD but has never been this short of breath in the past. Used a breathing treatment today and states that it did help "for a little while" Pt states that she first noticed WEST ~1 week ago which is new. Denies any chest pain per se, c/o left shoulder pain also endorses palpitations while she is short of breath. Denies any cough. Denies any dizziness, blurred vision or headaches. Recent echo was done in April, which showed moderate concentric hypertrophy and grade I ventricular diastolic dysfunction.     * No surgery found *      Hospital Course:   Patient monitored closely during hospitalization. VQ scan demonstrate probable pulmonary emboli. She was iniiated on full dose lovenox.  Pulmonary consulted. She underwent US venous BLE which demonstrated Interval development of enlargement of the left popliteal vein and nonocclusive peripheral thrombus within the left popliteal vein focally and  Complete thrombosis of a superficial venous varicosity in the left popliteal fossa. She was transitioned from lovenox to oral Eliquis. She denies dyspnea and chest pain. She is stable for DC.   Ensured Eliquis is affordable.     PE: chest CTA. Heart RRR     Consults:   Consults         Status Ordering Provider     Inpatient consult to Pulmonology  Once     Provider:  Jeffrey Christopher MD    Completed HIRAM SAEED          Acute pulmonary embolism                Final Active Diagnoses:    Diagnosis Date Noted POA    PRINCIPAL PROBLEM:  Shortness of breath [R06.02] 06/09/2018 Yes    Controlled type 2 diabetes " mellitus, without long-term current use of insulin [E11.9] 06/09/2018 Yes    Acute pulmonary embolism [I26.99] 06/09/2018 Yes    SOB (shortness of breath) [R06.02] 06/09/2018 Yes    Asthma-COPD overlap syndrome [J44.9] 06/09/2018 Yes    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] 06/09/2018 Yes    COPD with acute exacerbation [J44.1] 10/14/2017 Yes    Left ventricular diastolic dysfunction with preserved systolic function [I51.9] 06/05/2016 Yes    COPD (chronic obstructive pulmonary disease) [J44.9] 01/08/2015 Yes    Hypothyroid [E03.9] 01/18/2013 Yes    Hypertension associated with diabetes [E11.59, I10] 01/18/2013 Yes    Hyperlipidemia associated with type 2 diabetes mellitus [E11.69, E78.5] 01/18/2013 Yes    Type 2 diabetes mellitus with stage 3 chronic kidney disease [E11.22, N18.3] 01/18/2013 Yes      Problems Resolved During this Admission:    Diagnosis Date Noted Date Resolved POA       Discharged Condition: stable    Disposition: Home or Self Care    Follow Up:  Follow-up Information     Eli Edmonds MD In 1 week.    Specialty:  Family Medicine  Why:  hospital follow up  Contact information:  [address] [place] [address]  [phone]             Jeffrey Christopher MD In 3 weeks.    Specialty:  Pulmonary Disease  Why:  pulmonary emboli  Contact information:  [address] [facility]  [address]  [address]  [facility] [id number] [phone]                 Patient Instructions:     Diet Cardiac     Activity as tolerated     Notify your health care provider if you experience any of the following:  severe persistent headache     Notify your health care provider if you experience any of the following:  difficulty breathing or increased cough     Notify your health care provider if you experience any of the following:  severe uncontrolled pain     Notify your health care provider if you experience any of the following:  persistent dizziness, light-headedness, or visual disturbances         Significant Diagnostic Studies: Labs:  BMP: No results for input(s): GLU, NA, K, CL, CO2, BUN, CREATININE, CALCIUM, MG in the last 48 hours. and CMP No results for input(s): NA, K, CL, CO2, GLU, BUN, CREATININE, CALCIUM, PROT, ALBUMIN, BILITOT, ALKPHOS, AST, ALT, ANIONGAP, ESTGFRAFRICA, EGFRNONAA in the last 48 hours.  Radiology:   US Lower Extremity Veins Bilateral [[id number]] (Abnormal) Resulted: 06/09/18 1547   Order Status: Completed Updated: 06/09/18 1549   Narrative:     EXAMINATION:  US LOWER EXTREMITY VEINS BILATERAL    CLINICAL HISTORY:  pulmomonary emboli;    TECHNIQUE:  Duplex and color flow Doppler and dynamic compression was performed of the bilateral lower extremity veins was performed.    COMPARISON:  None    FINDINGS:  Right thigh veins: The common femoral, femoral, popliteal, upper greater saphenous, and deep femoral veins are patent and free of thrombus. The veins are normally compressible and have normal phasic flow and augmentation response.  There is a duplicated right femoral vein as an incidental finding.    Right calf veins: The visualized calf veins are patent.    Left thigh veins: There is dilatation of the left popliteal vein and there is peripheral nonocclusive hypoechoic thrombus present at the margins of the left popliteal vein.  There is an incompetent valve with venous reflux noted in the left popliteal vein in this vicinity.  The common femoral, femoral, upper greater saphenous, and deep femoral veins are patent and free of thrombus and are normally compressible with normal phasic flow and augmentation response.    Left calf veins: The visualized calf veins are patent.    Miscellaneous: Incidental note is made of a thrombosed superficial venous varicosity within the left popliteal region.   Impression:       1. Interval development of enlargement of the left popliteal vein and nonocclusive peripheral thrombus within the left popliteal vein focally.  2. Complete thrombosis of a superficial venous varicosity in the left  popliteal fossa.  3. Venous reflux within the left popliteal vein is a result of an incompetent venous valve.  This report was flagged in Epic as abnormal.      Electronically signed by: Aubrey Davis MD  Date: 06/09/2018  Time: 15:47   NM Lung Ventilation Perfusion Imaging [[id number]] Resulted: 06/09/18 0908   Order Status: Completed Updated: 06/09/18 0910   Narrative:     EXAMINATION:  NM LUNG VENTILATION AND PERFUSION IMAGING    CLINICAL HISTORY:  Chest pain, acute, PE suspected, high pretest prob;    TECHNIQUE:  Following the inhalation of 12.5 mCi of xenon 133 and the subsequent IV administration of 5.5 mCi of Tc-99m-MAA, multiple images of the thorax were obtained in various projections utilizing a gamma camera.    COMPARISON:  Chest x-ray-06/08/2018    FINDINGS:  Perfusion: There are at least 2 moderate segmental mismatch perfusion defects present, and there are several small peripheral mismatched perfusion defects present.    Ventilation: There is delayed washout of radiotracer from the left and right lung (especially the lower lobes) suggesting air trapping from chronic obstructive airways disease..    CXR: The heart is enlarged.  There is band like segmental opacity present within the right middle lobe.   Impression:       This represents an intermediate probability V/Q scan for the presence of pulmonary embolism.  Consider additional evaluation with lower extremity venous ultrasound or CTA chest.      Electronically signed by: Aubrey Daivs MD  Date: 06/09/2018  Time: 09:08   X-Ray Chest AP Portable [[id number]] Resulted: 06/08/18 2225   Order Status: Completed Updated: 06/08/18 2227   Narrative:     EXAMINATION:  XR CHEST AP PORTABLE    CLINICAL HISTORY:  Asthma;    TECHNIQUE:  A single portable upright AP chest radiograph was acquired.    COMPARISON:  Chest x-ray-06/08/2018 at 3:11 p.m.    FINDINGS:  The cardiac silhouette is at the upper limits of normal in size but unchanged.  No pulmonary vascular  congestion appreciated. There is bandlike segmental airspace opacity in the right middle lobe, most likely atelectasis.  Pneumonitis could produce a similar appearance.  No significant volume of pleural fluid or pneumothorax. The bones reveal degenerative changes of the thoracic spine..   Impression:       No interval detrimental change when compared to the prior study.      Electronically signed by: Aubrey Davis MD  Date: 06/08/2018  Time: 22:25            Pending Diagnostic Studies:     None         Medications:  Reconciled Home Medications:      Medication List      START taking these medications    apixaban 5 mg Tab  Take 1 tablet (5 mg total) by mouth 2 (two) times daily.        CHANGE how you take these medications    predniSONE 20 MG tablet  Commonly known as:  DELTASONE  Take 3 daily for  3 days then 2 daily for 3 days then one daily for 3 days then stop, repeat for asthma/copd  What changed:  · how much to take  · how to take this  · when to take this  · additional instructions        CONTINUE taking these medications    albuterol-ipratropium 2.5 mg-0.5 mg/3 mL nebulizer solution  Commonly known as:  DUO-NEB  Take 3 mLs by nebulization every 6 (six) hours while awake. Rescue     fluticasone 50 mcg/actuation nasal spray  Commonly known as:  FLONASE  2 sprays by Each Nare route once daily.     fluticasone-umeclidin-vilanter 100-62.5-25 mcg Dsdv  Inhale 1 puff into the lungs once daily.     montelukast 10 mg tablet  Commonly known as:  SINGULAIR  Take 1 tablet (10 mg total) by mouth every evening.        ASK your doctor about these medications    amLODIPine 10 MG tablet  Commonly known as:  NORVASC  Take 1 tablet (10 mg total) by mouth once daily.     aspirin 81 MG EC tablet  Commonly known as:  ECOTRIN  Take 81 mg by mouth once daily.     blood pressure monitor Kit  1 Units by Misc.(Non-Drug; Combo Route) route 2 (two) times daily.     calcium carbonate 500 mg calcium (1,250 mg) tablet  Commonly known as:   OS-TASIA  Take 1 tablet by mouth 2 (two) times daily.     cloNIDine 0.1 MG tablet  Commonly known as:  CATAPRES  Take 1 tablet (0.1 mg total) by mouth 2 (two) times daily. Take one tablet by mouth every hour as needed for systolic blood pressure greater than 180.  Do not take more than 3 tablets in 24 hours.     ferrous sulfate 325 mg (65 mg iron) Tab tablet  Take 1 tablet (325 mg total) by mouth 2 (two) times daily.     fish oil-omega-3 fatty acids 300-1,000 mg capsule  Take 2 g by mouth every evening.     gabapentin 300 MG capsule  Commonly known as:  NEURONTIN  Take 1 capsule (300 mg total) by mouth every evening.     guanFACINE 1 MG Tab  Commonly known as:  TENEX  TAKE 1 TABLET BY MOUTH IN THE EVENING     levothyroxine 100 MCG tablet  Commonly known as:  SYNTHROID  Take 1 tablet (100 mcg total) by mouth before breakfast.     losartan 100 MG tablet  Commonly known as:  COZAAR  Take 1 tablet (100 mg total) by mouth once daily.     magnesium oxide 400 mg tablet  Commonly known as:  MAG-OX  take by mouth once daily     nitroGLYCERIN 0.4 MG SL tablet  Commonly known as:  NITROSTAT  Place 1 tablet (0.4 mg total) under the tongue every 5 (five) minutes as needed for Chest pain. As needed     sertraline 25 MG tablet  Commonly known as:  ZOLOFT  Take 1 tablet (25 mg total) by mouth once daily.     simvastatin 40 MG tablet  Commonly known as:  ZOCOR  TAKE ONE TABLET BY MOUTH ONCE DAILY IN THE EVENING     VITAMIN C 500 MG tablet  Generic drug:  ascorbic acid (vitamin C)  Take 500 mg by mouth once daily.     vitamin D 1000 units Tab  Take 1 tablet (1,000 Units total) by mouth once daily.            Indwelling Lines/Drains at time of discharge:   Lines/Drains/Airways          No matching active lines, drains, or airways          Time spent on the discharge of patient: 35 minutes  Patient was seen and examined on the date of discharge and determined to be suitable for discharge.         Sadie Saeed NP  Department of  Hospital Medicine [facility]

## 2018-06-11 NOTE — TELEPHONE ENCOUNTER
----- Message from RT Sin sent at 6/11/2018 11:23 AM CDT -----  Contact: pt    pt , [facility]  f/u appt to be worked in, thanks.

## 2018-06-11 NOTE — HOSPITAL COURSE
Patient monitored closely during hospitalization. VQ scan demonstrate probable pulmonary emboli. She was iniiated on full dose lovenox.  Pulmonary consulted. She underwent US venous BLE which demonstrated Interval development of enlargement of the left popliteal vein and nonocclusive peripheral thrombus within the left popliteal vein focally and  Complete thrombosis of a superficial venous varicosity in the left popliteal fossa. She was transitioned from lovenox to oral Eliquis. She denies dyspnea and chest pain. She is stable for DC.   Ensured Eliquis is affordable.     PE: chest CTA. Heart RRR

## 2018-06-11 NOTE — TELEPHONE ENCOUNTER
Gave patient appointment for 7/2/2018 at 2:00 pm.      ----- Message from Suzie Lambert RT sent at 6/11/2018 11:20 AM CDT -----  Contact: pt    pt , requesting an [facility] 3 week (from today) f/u appt to be worked in, thanks.

## 2018-06-22 ENCOUNTER — DOCUMENTATION ONLY (OUTPATIENT)
Dept: FAMILY MEDICINE | Facility: CLINIC | Age: 83
End: 2018-06-22

## 2018-06-22 NOTE — PROGRESS NOTES
Pre-Visit Chart Review  For Appointment Scheduled on 06/25/18    Health Maintenance Due   Topic Date Due    TETANUS VACCINE  07/21/1948    Eye Exam  10/30/2016    Foot Exam  11/29/2017    Lipid Panel  05/23/2018

## 2018-06-23 VITALS
SYSTOLIC BLOOD PRESSURE: 112 MMHG | WEIGHT: 138 LBS | DIASTOLIC BLOOD PRESSURE: 51 MMHG | HEART RATE: 68 BPM | RESPIRATION RATE: 16 BRPM | BODY MASS INDEX: 24.45 KG/M2 | OXYGEN SATURATION: 96 % | HEIGHT: 63 IN

## 2018-06-23 PROCEDURE — 99284 EMERGENCY DEPT VISIT MOD MDM: CPT | Mod: 25

## 2018-06-24 ENCOUNTER — HOSPITAL ENCOUNTER (EMERGENCY)
Facility: HOSPITAL | Age: 83
Discharge: HOME | End: 2018-06-24
Attending: EMERGENCY MEDICINE
Payer: MEDICARE

## 2018-06-24 DIAGNOSIS — R52 PAIN: ICD-10-CM

## 2018-06-24 DIAGNOSIS — M79.605 LOWER EXTREMITY PAIN, LEFT: ICD-10-CM

## 2018-06-24 PROCEDURE — 25000003 PHARM REV CODE 250

## 2018-06-24 PROCEDURE — 25000003 PHARM REV CODE 250: Performed by: EMERGENCY MEDICINE

## 2018-06-24 RX ORDER — TRAMADOL HYDROCHLORIDE 50 MG/1
TABLET ORAL
Status: COMPLETED
Start: 2018-06-24 | End: 2018-06-24

## 2018-06-24 RX ORDER — ACETAMINOPHEN 500 MG
1000 TABLET ORAL
Status: COMPLETED | OUTPATIENT
Start: 2018-06-24 | End: 2018-06-24

## 2018-06-24 RX ORDER — TRAMADOL HYDROCHLORIDE 50 MG/1
50 TABLET ORAL EVERY 6 HOURS PRN
Qty: 12 TABLET | Refills: 0 | Status: SHIPPED | OUTPATIENT
Start: 2018-06-24 | End: 2018-07-04

## 2018-06-24 RX ADMIN — TRAMADOL HYDROCHLORIDE: 50 TABLET, FILM COATED ORAL at 03:06

## 2018-06-24 RX ADMIN — ACETAMINOPHEN 1000 MG: 500 TABLET ORAL at 01:06

## 2018-06-24 NOTE — ED NOTES
Pt continues to complain of severe pain to left ankle and foot.  Respirations even.  Family at bedside.

## 2018-06-24 NOTE — ED PROVIDER NOTES
Encounter Date: 6/23/2018    SCRIBE #1 NOTE: Gi WOOD am scribing for, and in the presence of, .       History     Chief Complaint   Patient presents with    Foot pain     Lt foot pain and swelling.  Denies injury        06/24/2018 1:28 AM     Chief complaint: leg pain      Blaire Cage is a 87 y.o. female with hx of PE, HTN, DM, HLD, COPD,  who presents to the ED with foot pain onset 2 days. Patient states the foot became swollen a couple days ago then began having increased pain today. She endorses being recently discharge after being dx with a PE 2 weeks ago. She reports being prescribed and taking apixaban. Patient denies chest pain, nausea, vomiting, fever, numbness, or weakness.         The history is provided by the patient. No  was used.     Review of patient's allergies indicates:   Allergen Reactions    Carvedilol Other (See Comments)     Bradycardia and syncope    Boniva [ibandronate]     Codeine     Hydralazine analogues     Iodinated contrast- oral and iv dye Hives    Kionex [sodium polystyrene sulfonate]     Lisinopril     Morphine      Past Medical History:   Diagnosis Date    Anticoagulant long-term use     aspirin    COPD (chronic obstructive pulmonary disease)     COPD with acute exacerbation 1/9/2015    Diabetes mellitus type II     DVT (deep venous thrombosis) 06/09/2018    Encounter for blood transfusion     Hip arthritis 3/1/2016    Hyperlipidemia     Hypertension     PE (pulmonary thromboembolism) 06/09/2018    Pneumonia of right lower lobe due to infectious organism 9/11/2017    Thyroid disease     hypothyroid     Past Surgical History:   Procedure Laterality Date    APPENDECTOMY      FRACTURE SURGERY      right hip     HERNIA REPAIR      groin    HYSTERECTOMY      VARICOSE VEIN SURGERY       Family History   Problem Relation Age of Onset    Hypertension Mother     Diabetes Sister     Diabetes Brother     Kidney  disease Son      Social History   Substance Use Topics    Smoking status: Former Smoker     Packs/day: 0.35     Years: 44.00     Types: Cigarettes     Quit date: 4/30/2015    Smokeless tobacco: Never Used      Comment: 1/08/2015    Alcohol use No     Review of Systems   Constitutional: Negative for fever.   Respiratory: Negative for shortness of breath.    Cardiovascular: Negative for chest pain.   Gastrointestinal: Negative for abdominal pain.   Genitourinary: Negative for dysuria.   Musculoskeletal: Negative for joint swelling.   Skin: Negative for rash.   Neurological: Negative for weakness.   Hematological: Bruises/bleeds easily.   Psychiatric/Behavioral: Negative for confusion.       Physical Exam     Initial Vitals [06/23/18 2319]   BP Pulse Resp Temp SpO2   (!) 112/51 68 16 -- 96 %      MAP       --         Physical Exam    Nursing note and vitals reviewed.  Constitutional: She appears well-nourished. No distress.   HENT:   Head: Normocephalic.   Eyes: Conjunctivae are normal.   Neck: Normal range of motion. Neck supple.   Cardiovascular: Normal rate and regular rhythm.   Pulmonary/Chest: No respiratory distress. She has no wheezes.   Abdominal: She exhibits no distension. There is no tenderness.   Musculoskeletal: Normal range of motion. She exhibits tenderness. She exhibits no edema.   On initial exam there is some tenderness with palpation of the calf and area just proximal to the ankle, there is no significant difference in size between the legs.  No phlegmasia cerulea dolens neurovascularly intact left lower extremity, foot is not cold or dusky   Neurological: She is alert and oriented to person, place, and time.   Skin: Skin is warm and dry. Capillary refill takes less than 2 seconds. No rash noted. No erythema.   Psychiatric: She has a normal mood and affect. Thought content normal.         ED Course   Procedures  Labs Reviewed - No data to display       Imaging Results          X-Ray Ankle Complete  Left (In process)                  Medical Decision Making:   History:   Old Medical Records: I decided to obtain old medical records.  Clinical Tests:   Radiological Study: Ordered and Reviewed  ED Management:  This patient was interviewed and examined emergently in the presence of her daughter.  At this time there is no gross abnormality on physical examination. The patient has a recent diagnosis of a large area of DVT in the left lower extremity.  No acute significant abnormality can be noted on x-ray.  An ultrasound of the arteries of the left lower extremity was obtained and indicates areas of stenosis with one area of occlusion with collateral circulation.  There is no evidence of acute arterial compromise clinically at this time.  Patient was initially provided acetaminophen and then later Ultram with marked improvement in pain control.  I suspect that her pain is secondary to underlying thromboembolic disease that is retained in the leg.  She and her daughter are strongly advised her to continue taking Eliquis as prescribed.  There additionally informed that Ultram which can make patient is drowsy which could cause falls.  They are asked to closely monitor the patient home when she is using this medication.  She is asked to follow up with her primary care doctor as soon as possible regarding improvement.  Patient's daughter was agreeable with this plan for follow-up and the patient was discharged in stable condition.            Scribe Attestation:   Scribe #1: I performed the above scribed service and the documentation accurately describes the services I performed. I attest to the accuracy of the note.    I, Dr. Gee Carbone, personally performed the services described in this documentation. All medical record entries made by the scribe were at my direction and in my presence.  I have reviewed the chart and agree that the record reflects my personal performance and is accurate and complete. Gee Carbone MD.   2:45 AM 06/24/2018             Clinical Impression:   Diagnoses of Pain and Lower extremity pain, left were pertinent to this visit.      Disposition:   Disposition: Discharged  Condition: Stable                        Gee Carbone MD  06/25/18 0621

## 2018-06-25 ENCOUNTER — PES CALL (OUTPATIENT)
Dept: ADMINISTRATIVE | Facility: CLINIC | Age: 83
End: 2018-06-25

## 2018-06-25 ENCOUNTER — OFFICE VISIT (OUTPATIENT)
Dept: FAMILY MEDICINE | Facility: CLINIC | Age: 83
End: 2018-06-25
Payer: MEDICARE

## 2018-06-25 VITALS
DIASTOLIC BLOOD PRESSURE: 42 MMHG | HEART RATE: 58 BPM | SYSTOLIC BLOOD PRESSURE: 101 MMHG | BODY MASS INDEX: 24.88 KG/M2 | TEMPERATURE: 98 F | HEIGHT: 63 IN | WEIGHT: 140.44 LBS

## 2018-06-25 DIAGNOSIS — I15.2 HYPERTENSION ASSOCIATED WITH DIABETES: ICD-10-CM

## 2018-06-25 DIAGNOSIS — I26.99 PE (PULMONARY THROMBOEMBOLISM): Primary | ICD-10-CM

## 2018-06-25 DIAGNOSIS — I82.4Y9 DEEP VEIN THROMBOSIS (DVT) OF PROXIMAL LOWER EXTREMITY, UNSPECIFIED CHRONICITY, UNSPECIFIED LATERALITY: ICD-10-CM

## 2018-06-25 DIAGNOSIS — E11.59 HYPERTENSION ASSOCIATED WITH DIABETES: ICD-10-CM

## 2018-06-25 DIAGNOSIS — D64.9 CHRONIC ANEMIA: ICD-10-CM

## 2018-06-25 DIAGNOSIS — N18.30 TYPE 2 DIABETES MELLITUS WITH STAGE 3 CHRONIC KIDNEY DISEASE, WITHOUT LONG-TERM CURRENT USE OF INSULIN: ICD-10-CM

## 2018-06-25 DIAGNOSIS — E11.22 TYPE 2 DIABETES MELLITUS WITH STAGE 3 CHRONIC KIDNEY DISEASE, WITHOUT LONG-TERM CURRENT USE OF INSULIN: ICD-10-CM

## 2018-06-25 DIAGNOSIS — R60.0 LOWER LEG EDEMA: ICD-10-CM

## 2018-06-25 DIAGNOSIS — F32.A DEPRESSION, UNSPECIFIED DEPRESSION TYPE: ICD-10-CM

## 2018-06-25 PROCEDURE — 99999 PR PBB SHADOW E&M-EST. PATIENT-LVL III: CPT | Mod: PBBFAC,,, | Performed by: PHYSICIAN ASSISTANT

## 2018-06-25 PROCEDURE — 99214 OFFICE O/P EST MOD 30 MIN: CPT | Mod: S$GLB,,, | Performed by: PHYSICIAN ASSISTANT

## 2018-06-25 PROCEDURE — 99499 UNLISTED E&M SERVICE: CPT | Mod: S$GLB,,, | Performed by: PHYSICIAN ASSISTANT

## 2018-06-27 ENCOUNTER — HOSPITAL ENCOUNTER (OUTPATIENT)
Facility: HOSPITAL | Age: 83
Discharge: HOME | End: 2018-06-28
Attending: EMERGENCY MEDICINE | Admitting: HOSPITALIST
Payer: MEDICARE

## 2018-06-27 DIAGNOSIS — R07.89 CHEST DISCOMFORT: ICD-10-CM

## 2018-06-27 DIAGNOSIS — R04.0 ACUTE ANTERIOR EPISTAXIS: ICD-10-CM

## 2018-06-27 DIAGNOSIS — E87.5 HYPERKALEMIA: Primary | ICD-10-CM

## 2018-06-27 LAB
ALBUMIN SERPL BCP-MCNC: 2.8 G/DL
ALP SERPL-CCNC: 64 U/L
ALT SERPL W/O P-5'-P-CCNC: 18 U/L
ANION GAP SERPL CALC-SCNC: 6 MMOL/L
AST SERPL-CCNC: 16 U/L
BASOPHILS # BLD AUTO: 0 K/UL
BASOPHILS NFR BLD: 0.3 %
BILIRUB SERPL-MCNC: 0.3 MG/DL
BUN SERPL-MCNC: 43 MG/DL
CALCIUM SERPL-MCNC: 9.4 MG/DL
CHLORIDE SERPL-SCNC: 106 MMOL/L
CO2 SERPL-SCNC: 23 MMOL/L
CREAT SERPL-MCNC: 1.6 MG/DL
DIFFERENTIAL METHOD: ABNORMAL
EOSINOPHIL # BLD AUTO: 0 K/UL
EOSINOPHIL NFR BLD: 0.2 %
ERYTHROCYTE [DISTWIDTH] IN BLOOD BY AUTOMATED COUNT: 13.5 %
EST. GFR  (AFRICAN AMERICAN): 33 ML/MIN/1.73 M^2
EST. GFR  (NON AFRICAN AMERICAN): 29 ML/MIN/1.73 M^2
ESTIMATED AVG GLUCOSE: 148 MG/DL
GLUCOSE SERPL-MCNC: 163 MG/DL
HBA1C MFR BLD HPLC: 6.8 %
HCT VFR BLD AUTO: 26.6 %
HGB BLD-MCNC: 8.9 G/DL
LYMPHOCYTES # BLD AUTO: 0.5 K/UL
LYMPHOCYTES NFR BLD: 5.6 %
MCH RBC QN AUTO: 32.3 PG
MCHC RBC AUTO-ENTMCNC: 33.7 G/DL
MCV RBC AUTO: 96 FL
MONOCYTES # BLD AUTO: 0.1 K/UL
MONOCYTES NFR BLD: 0.9 %
NEUTROPHILS # BLD AUTO: 7.7 K/UL
NEUTROPHILS NFR BLD: 93 %
PLATELET # BLD AUTO: 158 K/UL
PMV BLD AUTO: 9.2 FL
POCT GLUCOSE: 299 MG/DL (ref 70–110)
POTASSIUM SERPL-SCNC: 5.9 MMOL/L
PROT SERPL-MCNC: 6 G/DL
RBC # BLD AUTO: 2.77 M/UL
SODIUM SERPL-SCNC: 135 MMOL/L
WBC # BLD AUTO: 8.2 K/UL

## 2018-06-27 PROCEDURE — 93010 ELECTROCARDIOGRAM REPORT: CPT | Mod: 76,,, | Performed by: INTERNAL MEDICINE

## 2018-06-27 PROCEDURE — 93005 ELECTROCARDIOGRAM TRACING: CPT

## 2018-06-27 PROCEDURE — 25000003 PHARM REV CODE 250: Performed by: EMERGENCY MEDICINE

## 2018-06-27 PROCEDURE — 99900035 HC TECH TIME PER 15 MIN (STAT)

## 2018-06-27 PROCEDURE — 80053 COMPREHEN METABOLIC PANEL: CPT

## 2018-06-27 PROCEDURE — 36415 COLL VENOUS BLD VENIPUNCTURE: CPT

## 2018-06-27 PROCEDURE — 83036 HEMOGLOBIN GLYCOSYLATED A1C: CPT

## 2018-06-27 PROCEDURE — 25000003 PHARM REV CODE 250: Performed by: NURSE PRACTITIONER

## 2018-06-27 PROCEDURE — 85025 COMPLETE CBC W/AUTO DIFF WBC: CPT

## 2018-06-27 PROCEDURE — 99284 EMERGENCY DEPT VISIT MOD MDM: CPT

## 2018-06-27 PROCEDURE — G0378 HOSPITAL OBSERVATION PER HR: HCPCS

## 2018-06-27 PROCEDURE — 93010 ELECTROCARDIOGRAM REPORT: CPT | Mod: ,,, | Performed by: INTERNAL MEDICINE

## 2018-06-27 PROCEDURE — 25000003 PHARM REV CODE 250: Performed by: HOSPITALIST

## 2018-06-27 RX ORDER — SERTRALINE HYDROCHLORIDE 25 MG/1
25 TABLET, FILM COATED ORAL DAILY
Status: DISCONTINUED | OUTPATIENT
Start: 2018-06-28 | End: 2018-06-28 | Stop reason: HOSPADM

## 2018-06-27 RX ORDER — SODIUM CHLORIDE 0.9 % (FLUSH) 0.9 %
5 SYRINGE (ML) INJECTION
Status: DISCONTINUED | OUTPATIENT
Start: 2018-06-27 | End: 2018-06-28 | Stop reason: HOSPADM

## 2018-06-27 RX ORDER — SODIUM,POTASSIUM PHOSPHATES 280-250MG
2 POWDER IN PACKET (EA) ORAL
Status: DISCONTINUED | OUTPATIENT
Start: 2018-06-27 | End: 2018-06-28 | Stop reason: HOSPADM

## 2018-06-27 RX ORDER — ACETAMINOPHEN 325 MG/1
650 TABLET ORAL EVERY 6 HOURS PRN
Status: DISCONTINUED | OUTPATIENT
Start: 2018-06-27 | End: 2018-06-28 | Stop reason: HOSPADM

## 2018-06-27 RX ORDER — GLUCAGON 1 MG
1 KIT INJECTION
Status: DISCONTINUED | OUTPATIENT
Start: 2018-06-27 | End: 2018-06-28 | Stop reason: HOSPADM

## 2018-06-27 RX ORDER — MONTELUKAST SODIUM 10 MG/1
10 TABLET ORAL NIGHTLY
Status: DISCONTINUED | OUTPATIENT
Start: 2018-06-27 | End: 2018-06-28 | Stop reason: HOSPADM

## 2018-06-27 RX ORDER — IPRATROPIUM BROMIDE AND ALBUTEROL SULFATE 2.5; .5 MG/3ML; MG/3ML
3 SOLUTION RESPIRATORY (INHALATION) EVERY 4 HOURS PRN
Status: DISCONTINUED | OUTPATIENT
Start: 2018-06-27 | End: 2018-06-28 | Stop reason: HOSPADM

## 2018-06-27 RX ORDER — POTASSIUM CHLORIDE 20 MEQ/15ML
40 SOLUTION ORAL
Status: DISCONTINUED | OUTPATIENT
Start: 2018-06-27 | End: 2018-06-28 | Stop reason: HOSPADM

## 2018-06-27 RX ORDER — LEVOTHYROXINE SODIUM 100 UG/1
100 TABLET ORAL
Status: DISCONTINUED | OUTPATIENT
Start: 2018-06-28 | End: 2018-06-28 | Stop reason: HOSPADM

## 2018-06-27 RX ORDER — LANOLIN ALCOHOL/MO/W.PET/CERES
800 CREAM (GRAM) TOPICAL
Status: DISCONTINUED | OUTPATIENT
Start: 2018-06-27 | End: 2018-06-28 | Stop reason: HOSPADM

## 2018-06-27 RX ORDER — GABAPENTIN 300 MG/1
300 CAPSULE ORAL NIGHTLY
Status: DISCONTINUED | OUTPATIENT
Start: 2018-06-27 | End: 2018-06-28 | Stop reason: HOSPADM

## 2018-06-27 RX ORDER — SIMVASTATIN 40 MG/1
40 TABLET, FILM COATED ORAL NIGHTLY
Status: DISCONTINUED | OUTPATIENT
Start: 2018-06-27 | End: 2018-06-28 | Stop reason: HOSPADM

## 2018-06-27 RX ORDER — LOSARTAN POTASSIUM 25 MG/1
100 TABLET ORAL DAILY
Status: DISCONTINUED | OUTPATIENT
Start: 2018-06-28 | End: 2018-06-28 | Stop reason: HOSPADM

## 2018-06-27 RX ORDER — SODIUM CHLORIDE 9 MG/ML
INJECTION, SOLUTION INTRAVENOUS CONTINUOUS
Status: DISCONTINUED | OUTPATIENT
Start: 2018-06-27 | End: 2018-06-28

## 2018-06-27 RX ORDER — AMLODIPINE BESYLATE 5 MG/1
10 TABLET ORAL DAILY
Status: DISCONTINUED | OUTPATIENT
Start: 2018-06-27 | End: 2018-06-28 | Stop reason: HOSPADM

## 2018-06-27 RX ORDER — IBUPROFEN 200 MG
16 TABLET ORAL
Status: DISCONTINUED | OUTPATIENT
Start: 2018-06-27 | End: 2018-06-28 | Stop reason: HOSPADM

## 2018-06-27 RX ORDER — FERROUS SULFATE 325(65) MG
325 TABLET, DELAYED RELEASE (ENTERIC COATED) ORAL DAILY
Status: DISCONTINUED | OUTPATIENT
Start: 2018-06-28 | End: 2018-06-28 | Stop reason: HOSPADM

## 2018-06-27 RX ORDER — IBUPROFEN 200 MG
24 TABLET ORAL
Status: DISCONTINUED | OUTPATIENT
Start: 2018-06-27 | End: 2018-06-28 | Stop reason: HOSPADM

## 2018-06-27 RX ADMIN — SODIUM CHLORIDE: 0.9 INJECTION, SOLUTION INTRAVENOUS at 06:06

## 2018-06-27 RX ADMIN — SIMVASTATIN 40 MG: 40 TABLET, FILM COATED ORAL at 08:06

## 2018-06-27 RX ADMIN — ACETAMINOPHEN 650 MG: 325 TABLET, FILM COATED ORAL at 06:06

## 2018-06-27 RX ADMIN — APIXABAN 5 MG: 2.5 TABLET, FILM COATED ORAL at 08:06

## 2018-06-27 RX ADMIN — MONTELUKAST SODIUM 10 MG: 10 TABLET, FILM COATED ORAL at 08:06

## 2018-06-27 RX ADMIN — GABAPENTIN 300 MG: 300 CAPSULE ORAL at 08:06

## 2018-06-27 RX ADMIN — SODIUM POLYSTYRENE SULFONATE 15 G: 15 SUSPENSION ORAL; RECTAL at 02:06

## 2018-06-27 NOTE — SUBJECTIVE & OBJECTIVE
Past Medical History:   Diagnosis Date    Anticoagulant long-term use     aspirin    COPD (chronic obstructive pulmonary disease)     COPD with acute exacerbation 1/9/2015    Diabetes mellitus type II     DVT (deep venous thrombosis) 06/09/2018    Encounter for blood transfusion     Hip arthritis 3/1/2016    Hyperlipidemia     Hypertension     PE (pulmonary thromboembolism) 06/09/2018    Pneumonia of right lower lobe due to infectious organism 9/11/2017    Thyroid disease     hypothyroid       Past Surgical History:   Procedure Laterality Date    APPENDECTOMY      FRACTURE SURGERY      right hip     HERNIA REPAIR      groin    HYSTERECTOMY      VARICOSE VEIN SURGERY         Review of patient's allergies indicates:   Allergen Reactions    Carvedilol Other (See Comments)     Bradycardia and syncope    Boniva [ibandronate]     Codeine     Hydralazine analogues     Iodinated contrast- oral and iv dye Hives    Kionex [sodium polystyrene sulfonate] Diarrhea     From encounter 12/11/17 for diarrhea--not true allergy.    Lisinopril     Morphine        No current facility-administered medications on file prior to encounter.      Current Outpatient Prescriptions on File Prior to Encounter   Medication Sig    albuterol-ipratropium 2.5mg-0.5mg/3mL (DUO-NEB) 0.5 mg-3 mg(2.5 mg base)/3 mL nebulizer solution Take 3 mLs by nebulization every 6 (six) hours while awake. Rescue    amlodipine (NORVASC) 10 MG tablet Take 1 tablet (10 mg total) by mouth once daily.    apixaban 5 mg Tab Take 1 tablet (5 mg total) by mouth 2 (two) times daily.    ascorbic acid (VITAMIN C) 500 MG tablet Take 500 mg by mouth once daily.      aspirin (ECOTRIN) 81 MG EC tablet Take 81 mg by mouth once daily.      calcium carbonate (OS-TASIA) 500 mg calcium (1,250 mg) tablet Take 1 tablet by mouth 2 (two) times daily.      cloNIDine (CATAPRES) 0.1 MG tablet Take 1 tablet (0.1 mg total) by mouth 2 (two) times daily. Take one tablet  by mouth every hour as needed for systolic blood pressure greater than 180.  Do not take more than 3 tablets in 24 hours.    ferrous sulfate 325 mg (65 mg iron) Tab tablet Take 1 tablet (325 mg total) by mouth 2 (two) times daily.    fish oil-omega-3 fatty acids 300-1,000 mg capsule Take 2 g by mouth every evening.     fluticasone (FLONASE) 50 mcg/actuation nasal spray 2 sprays by Each Nare route once daily.    gabapentin (NEURONTIN) 300 MG capsule Take 1 capsule (300 mg total) by mouth every evening.    guanFACINE (TENEX) 1 MG Tab TAKE 1 TABLET BY MOUTH IN THE EVENING    levothyroxine (SYNTHROID) 100 MCG tablet Take 1 tablet (100 mcg total) by mouth before breakfast.    losartan (COZAAR) 100 MG tablet Take 1 tablet (100 mg total) by mouth once daily.    magnesium oxide (MAG-OX) 400 mg tablet take by mouth once daily    montelukast (SINGULAIR) 10 mg tablet Take 1 tablet (10 mg total) by mouth every evening.    nitroGLYCERIN (NITROSTAT) 0.4 MG SL tablet Place 1 tablet (0.4 mg total) under the tongue every 5 (five) minutes as needed for Chest pain. As needed    sertraline (ZOLOFT) 25 MG tablet Take 1 tablet (25 mg total) by mouth once daily.    simvastatin (ZOCOR) 40 MG tablet TAKE ONE TABLET BY MOUTH ONCE DAILY IN THE EVENING    traMADol (ULTRAM) 50 mg tablet Take 1 tablet (50 mg total) by mouth every 6 (six) hours as needed for Pain.    vitamin D 1000 units Tab Take 1 tablet (1,000 Units total) by mouth once daily.    [DISCONTINUED] blood pressure monitor Kit 1 Units by Misc.(Non-Drug; Combo Route) route 2 (two) times daily.    [DISCONTINUED] fluticasone-umeclidin-vilanter 100-62.5-25 mcg DsDv Inhale 1 puff into the lungs once daily.    [DISCONTINUED] predniSONE (DELTASONE) 20 MG tablet Take 3 daily for  3 days then 2 daily for 3 days then one daily for 3 days then stop, repeat for asthma/copd     Family History     Problem Relation (Age of Onset)    Diabetes Sister, Brother    Hypertension Mother     Kidney disease Son        Social History Main Topics    Smoking status: Former Smoker     Packs/day: 0.35     Years: 44.00     Types: Cigarettes     Quit date: 4/30/2015    Smokeless tobacco: Never Used      Comment: 1/08/2015    Alcohol use No    Drug use: No    Sexual activity: No     Review of Systems   Constitutional: Positive for appetite change and fatigue. Negative for diaphoresis and fever.   HENT: Positive for nosebleeds.    Eyes: Negative for pain and redness.   Respiratory: Negative for cough and shortness of breath.    Cardiovascular: Negative for chest pain and leg swelling.   Gastrointestinal: Negative for abdominal distention and abdominal pain.   Endocrine: Negative for polyphagia and polyuria.   Genitourinary: Negative for dysuria and flank pain.   Musculoskeletal: Negative for back pain and gait problem.   Skin: Negative for rash.   Neurological: Negative for speech difficulty and numbness.   Psychiatric/Behavioral: Negative for confusion and hallucinations.     Objective:     Vital Signs (Most Recent):  Temp: 98.4 °F (36.9 °C) (06/27/18 1555)  Pulse: 67 (06/27/18 1555)  Resp: 18 (06/27/18 1555)  BP: (!) 178/74 (06/27/18 1555)  SpO2: 97 % (06/27/18 1555) Vital Signs (24h Range):  Temp:  [97.8 °F (36.6 °C)-98.4 °F (36.9 °C)] 98.4 °F (36.9 °C)  Pulse:  [62-67] 67  Resp:  [16-18] 18  SpO2:  [93 %-97 %] 97 %  BP: (135-178)/(61-74) 178/74     Weight: 63.5 kg (139 lb 15.9 oz)  Body mass index is 24.8 kg/m².    Physical Exam   Constitutional: She is oriented to person, place, and time. She appears well-developed and well-nourished.   HENT:   Head: Normocephalic and atraumatic.   Right Ear: External ear normal.   Left Ear: External ear normal.   Mouth/Throat: Oropharynx is clear and moist.   Small amount of dried blood in nare   Eyes: Conjunctivae and EOM are normal. Pupils are equal, round, and reactive to light.   Neck: Normal range of motion. Neck supple.   Cardiovascular: Normal rate, regular  rhythm, normal heart sounds and intact distal pulses.    No murmur heard.  Pulmonary/Chest: Effort normal and breath sounds normal. She has no wheezes.   Abdominal: Soft. Bowel sounds are normal. There is no tenderness.   Musculoskeletal: Normal range of motion.   Neurological: She is alert and oriented to person, place, and time. Coordination normal.   Skin: Skin is warm and dry.   Psychiatric: She has a normal mood and affect. Her behavior is normal. Judgment normal.   Nursing note and vitals reviewed.        CRANIAL NERVES     CN III, IV, VI   Pupils are equal, round, and reactive to light.  Extraocular motions are normal.        Significant Labs:   BMP:   Recent Labs  Lab 06/27/18  1139   *   *   K 5.9*      CO2 23   BUN 43*   CREATININE 1.6*   CALCIUM 9.4     CBC:   Recent Labs  Lab 06/27/18  1139   WBC 8.20   HGB 8.9*   HCT 26.6*          Significant Imaging: I have reviewed and interpreted all pertinent imaging results/findings within the past 24 hours.

## 2018-06-27 NOTE — HPI
"Blaire Cage is a 87 y.o. female with a PMH Of hypertensin, DM type 2, COPD, and recent diagnosis of pulmonary emboli who presents to the ED for evaluation of epistaxis since yesterday. She reports several episodes of epistaxisthat resolved on their own with pressure. She states she became worried "when blood out both nostril and one side had a long string." She denies injury or pain. She reports feeling fatigued and having decreased appetite for a couple of days.  Denies chest pain and shortness of breath. On laboratory data in the ED, she was noted to have hyperkalemia and was placed in hospital for further evaluation and treatment.  "

## 2018-06-27 NOTE — ED PROVIDER NOTES
"Encounter Date: 6/27/2018    SCRIBE #1 NOTE: I, Tony Martinez, am scribing for, and in the presence of, Dr. Dudley.       History     Chief Complaint   Patient presents with    Epistaxis    Fatigue     Time seen by provider: 11:02 AM on 06/27/2018    Chief Complaint: epistaxis     Blaire Cage is a 87 y.o. female with a PMHx of HTN, HLD, DM, COPD, and PE who presents to the ED for further evaluation of epistaxis episodes that started yesterday. Patient states that yesterday she started having frequent epistaxis episodes that resolved on their own with pressure. She relays that it was "oozing out" throughout the day. Caregiver states that the patient informed her that she was having another epistaxis event around 10 am. Patient admits that the "it was coming out of both sides." The patient relays that she "blew my nose and a long string of blood came out." Caretaker admits that the patient has started a new anticoagulation medication since her last hospitalization. Patient relays that she is having generalized fatigue, admitting "I have just been feeling really weak." The caretaker endorses the patient using her walker, "she typically does not use it." Patient reports that she has been having a loss of appetite lately. Patient does admit that she feels like she has been wheezing more often since the beginning of the week. She denies chest pain, abdominal pain, cough, SOB, sore throat, fever, nausea, vomiting, blood in stool, and rhinorrhea. Patient has a PSHx of appendectomy and hysterectomy.       The history is provided by the patient.     Review of patient's allergies indicates:   Allergen Reactions    Carvedilol Other (See Comments)     Bradycardia and syncope    Boniva [ibandronate]     Codeine     Hydralazine analogues     Iodinated contrast- oral and iv dye Hives    Kionex [sodium polystyrene sulfonate]     Lisinopril     Morphine      Past Medical History:   Diagnosis Date    Anticoagulant " long-term use     aspirin    COPD (chronic obstructive pulmonary disease)     COPD with acute exacerbation 1/9/2015    Diabetes mellitus type II     DVT (deep venous thrombosis) 06/09/2018    Encounter for blood transfusion     Hip arthritis 3/1/2016    Hyperlipidemia     Hypertension     PE (pulmonary thromboembolism) 06/09/2018    Pneumonia of right lower lobe due to infectious organism 9/11/2017    Thyroid disease     hypothyroid     Past Surgical History:   Procedure Laterality Date    APPENDECTOMY      FRACTURE SURGERY      right hip     HERNIA REPAIR      groin    HYSTERECTOMY      VARICOSE VEIN SURGERY       Family History   Problem Relation Age of Onset    Hypertension Mother     Diabetes Sister     Diabetes Brother     Kidney disease Son      Social History   Substance Use Topics    Smoking status: Former Smoker     Packs/day: 0.35     Years: 44.00     Types: Cigarettes     Quit date: 4/30/2015    Smokeless tobacco: Never Used      Comment: 1/08/2015    Alcohol use No     Review of Systems   Constitutional: Positive for appetite change and fatigue. Negative for diaphoresis and fever.   HENT: Positive for nosebleeds. Negative for sore throat.    Respiratory: Negative for cough and shortness of breath.    Cardiovascular: Negative for chest pain.   Gastrointestinal: Negative for abdominal pain, blood in stool, nausea and vomiting.   Genitourinary: Negative for difficulty urinating and dysuria.   Musculoskeletal: Negative for myalgias.   Skin: Negative for wound.   Neurological: Negative for headaches.   All other systems reviewed and are negative.       Physical Exam     Initial Vitals [06/27/18 1057]   BP Pulse Resp Temp SpO2   135/61 63 16 97.8 °F (36.6 °C) (!) 94 %      MAP       --         Physical Exam    Nursing note and vitals reviewed.  Constitutional: She appears well-developed and well-nourished. She is not diaphoretic. No distress.   HENT:   Head: Normocephalic and atraumatic.    Mouth/Throat: Oropharynx is clear and moist. No posterior oropharyngeal edema or posterior oropharyngeal erythema.   Dried blood in the left naris more than the right. No active bleeding present. No dried blood noted to the posterior oropharynx.    Eyes: Conjunctivae are normal.   Neck: Neck supple.   Cardiovascular: Normal rate, regular rhythm, normal heart sounds and intact distal pulses. Exam reveals no gallop and no friction rub.    No murmur heard.  Pulmonary/Chest: No accessory muscle usage. No tachypnea. She has no wheezes. She has rhonchi (scattered bilaterally). She has no rales.   Abdominal: Soft. She exhibits no distension. There is no tenderness.   Musculoskeletal: Normal range of motion.   1+ edema to the bilateral lower extremities.    Neurological: She is alert and oriented to person, place, and time.   Cranial nerves III-XII intact   5/5 strength and sensation in all extremities.    Skin: No rash noted. No erythema.   Psychiatric: Her speech is normal.         ED Course   Procedures  Labs Reviewed   CBC W/ AUTO DIFFERENTIAL - Abnormal; Notable for the following:        Result Value    RBC 2.77 (*)     Hemoglobin 8.9 (*)     Hematocrit 26.6 (*)     MCH 32.3 (*)     Lymph # 0.5 (*)     Mono # 0.1 (*)     Gran% 93.0 (*)     Lymph% 5.6 (*)     Mono% 0.9 (*)     All other components within normal limits   COMPREHENSIVE METABOLIC PANEL - Abnormal; Notable for the following:     Sodium 135 (*)     Potassium 5.9 (*)     Glucose 163 (*)     BUN, Bld 43 (*)     Creatinine 1.6 (*)     Albumin 2.8 (*)     Anion Gap 6 (*)     eGFR if  33 (*)     eGFR if non  29 (*)     All other components within normal limits          Imaging Results          X-Ray Chest PA And Lateral (Final result)  Result time 06/27/18 12:44:11    Final result by Veronica Olson MD (06/27/18 12:44:11)                 Impression:      Trace bilateral pleural effusions new compared to the prior exam.  Right  basilar atelectasis appearing mildly increased right lung base.  Note is made infiltrate could also be present right lung base as before.      Electronically signed by: Veronica Olson MD  Date:    06/27/2018  Time:    12:44             Narrative:    EXAMINATION:  XR CHEST PA AND LATERAL    CLINICAL HISTORY:  Cough, fatigue, r/o pna;    TECHNIQUE:  PA and lateral views of the chest were performed.    COMPARISON:  6/8/2018    FINDINGS:  The cardiomediastinal silhouette is stable.  There is right medial basilar atelectasis, and there is strandy atelectasis laterally right lung base..  There are trace pleural effusions which have become apparent since the prior exam and the strandy atelectasis right lateral lung base new.                            (radiology reading, visualized by me)       Medical Decision Making:   History:   Old Medical Records: I decided to obtain old medical records.  Clinical Tests:   Lab Tests: Ordered and Reviewed            Scribe Attestation:   Scribe #1: I performed the above scribed service and the documentation accurately describes the services I performed. I attest to the accuracy of the note.    I, Dr. Harpreet Dudley, personally performed the services described in this documentation. All medical record entries made by the scribe were at my direction and in my presence.  I have reviewed the chart and agree that the record reflects my personal performance and is accurate and complete. Harpreet Dudley MD.  2:26 PM 06/27/2018    Blaire Cage is a 87 y.o. female presenting with resolved epistaxis in the setting of generalized weakness. No focal findings or symptoms to suggest acute intracranial process such as CVA.  I do not think brain imaging is indicated.  No sign of other infectious or metabolic focus to explain patient's symptoms.  No sign of cardiac arrhythmia.  Possible peaking of T-waves correlating to incidental hyperkalemia.  I do not think intracellular shifting or IV  "calcium is indicated.  Oral Kayexalate initiated at suggestion of hospitalist service who will be assuming care after discussion with "allergy" which is actually known side effect of diarrhea with prior Kayexalate.  No further need for intervention at this point for epistaxis but it is likely appropriate for outpatient ENT follow-up.  No indication for packing at this point.          ED Course as of Jun 27 1426   Wed Jun 27, 2018   1300 EKG:  Sinus rhythm with primary AV block, DE interval 224 ms, other intervals normal.  There are no acute ST or T wave changes suggestive of acute ischemia or infarction.  Peaked T-waves compared to prior possibly correlating to hyperkalemia, but no QRS widening.    [MR]   1311 Patient has "allergy" to kayexalate unknown to patient or family listed in chart, entered 12/11/17.  On that date PCP encounter including for diarrhea attributed in part to kayexalate as a side effect.  This is unlikely a true allergy.  [MR]      ED Course User Index  [MR] Harpreet Dudley MD     Clinical Impression:   The primary encounter diagnosis was Hyperkalemia. A diagnosis of Acute anterior epistaxis was also pertinent to this visit.      Disposition:   Disposition: Discharged  Condition: Stable                        Harpreet Dudley MD  06/27/18 1428    "

## 2018-06-27 NOTE — H&P
"[facility] Hospital Medicine  History & Physical    Patient Name: Blaire Cage  MRN: [id number]  Admission Date: 6/27/2018  Attending Physician: Venancio Zamarripa MD   Primary Care Provider: Eli Edmonds MD         Patient information was obtained from patient and ER records.     Subjective:     Principal Problem:Hyperkalemia    Chief Complaint:   Chief Complaint   Patient presents with    Epistaxis    Fatigue        HPI: Blaire Cage is a 87 y.o. female with a PMH Of hypertensin, DM type 2, COPD, and recent diagnosis of pulmonary emboli who presents to the ED for evaluation of epistaxis since yesterday. She reports several episodes of epistaxisthat resolved on their own with pressure. She states she became worried "when blood out both nostril and one side had a long string." She denies injury or pain. She reports feeling fatigued and having decreased appetite for a couple of days.  Denies chest pain and shortness of breath. On laboratory data in the ED, she was noted to have hyperkalemia and is admitted for observation.    Past Medical History:   Diagnosis Date    Anticoagulant long-term use     aspirin    COPD (chronic obstructive pulmonary disease)     COPD with acute exacerbation 1/9/2015    Diabetes mellitus type II     DVT (deep venous thrombosis) 06/09/2018    Encounter for blood transfusion     Hip arthritis 3/1/2016    Hyperlipidemia     Hypertension     PE (pulmonary thromboembolism) 06/09/2018    Pneumonia of right lower lobe due to infectious organism 9/11/2017    Thyroid disease     hypothyroid       Past Surgical History:   Procedure Laterality Date    APPENDECTOMY      FRACTURE SURGERY      right hip     HERNIA REPAIR      groin    HYSTERECTOMY      VARICOSE VEIN SURGERY         Review of patient's allergies indicates:   Allergen Reactions    Carvedilol Other (See Comments)     Bradycardia and syncope    Boniva [ibandronate]     Codeine     " Hydralazine analogues     Iodinated contrast- oral and iv dye Hives    Kionex [sodium polystyrene sulfonate] Diarrhea     From encounter 12/11/17 for diarrhea--not true allergy.    Lisinopril     Morphine        No current facility-administered medications on file prior to encounter.      Current Outpatient Prescriptions on File Prior to Encounter   Medication Sig    albuterol-ipratropium 2.5mg-0.5mg/3mL (DUO-NEB) 0.5 mg-3 mg(2.5 mg base)/3 mL nebulizer solution Take 3 mLs by nebulization every 6 (six) hours while awake. Rescue    amlodipine (NORVASC) 10 MG tablet Take 1 tablet (10 mg total) by mouth once daily.    apixaban 5 mg Tab Take 1 tablet (5 mg total) by mouth 2 (two) times daily.    ascorbic acid (VITAMIN C) 500 MG tablet Take 500 mg by mouth once daily.      aspirin (ECOTRIN) 81 MG EC tablet Take 81 mg by mouth once daily.      calcium carbonate (OS-TASIA) 500 mg calcium (1,250 mg) tablet Take 1 tablet by mouth 2 (two) times daily.      cloNIDine (CATAPRES) 0.1 MG tablet Take 1 tablet (0.1 mg total) by mouth 2 (two) times daily. Take one tablet by mouth every hour as needed for systolic blood pressure greater than 180.  Do not take more than 3 tablets in 24 hours.    ferrous sulfate 325 mg (65 mg iron) Tab tablet Take 1 tablet (325 mg total) by mouth 2 (two) times daily.    fish oil-omega-3 fatty acids 300-1,000 mg capsule Take 2 g by mouth every evening.     fluticasone (FLONASE) 50 mcg/actuation nasal spray 2 sprays by Each Nare route once daily.    gabapentin (NEURONTIN) 300 MG capsule Take 1 capsule (300 mg total) by mouth every evening.    guanFACINE (TENEX) 1 MG Tab TAKE 1 TABLET BY MOUTH IN THE EVENING    levothyroxine (SYNTHROID) 100 MCG tablet Take 1 tablet (100 mcg total) by mouth before breakfast.    losartan (COZAAR) 100 MG tablet Take 1 tablet (100 mg total) by mouth once daily.    magnesium oxide (MAG-OX) 400 mg tablet take by mouth once daily    montelukast (SINGULAIR) 10  mg tablet Take 1 tablet (10 mg total) by mouth every evening.    nitroGLYCERIN (NITROSTAT) 0.4 MG SL tablet Place 1 tablet (0.4 mg total) under the tongue every 5 (five) minutes as needed for Chest pain. As needed    sertraline (ZOLOFT) 25 MG tablet Take 1 tablet (25 mg total) by mouth once daily.    simvastatin (ZOCOR) 40 MG tablet TAKE ONE TABLET BY MOUTH ONCE DAILY IN THE EVENING    traMADol (ULTRAM) 50 mg tablet Take 1 tablet (50 mg total) by mouth every 6 (six) hours as needed for Pain.    vitamin D 1000 units Tab Take 1 tablet (1,000 Units total) by mouth once daily.    [DISCONTINUED] blood pressure monitor Kit 1 Units by Misc.(Non-Drug; Combo Route) route 2 (two) times daily.    [DISCONTINUED] fluticasone-umeclidin-vilanter 100-62.5-25 mcg DsDv Inhale 1 puff into the lungs once daily.    [DISCONTINUED] predniSONE (DELTASONE) 20 MG tablet Take 3 daily for  3 days then 2 daily for 3 days then one daily for 3 days then stop, repeat for asthma/copd     Family History     Problem Relation (Age of Onset)    Diabetes Sister, Brother    Hypertension Mother    Kidney disease Son        Social History Main Topics    Smoking status: Former Smoker     Packs/day: 0.35     Years: 44.00     Types: Cigarettes     Quit date: 4/30/2015    Smokeless tobacco: Never Used      Comment: 1/08/2015    Alcohol use No    Drug use: No    Sexual activity: No     Review of Systems   Constitutional: Positive for appetite change and fatigue. Negative for diaphoresis and fever.   HENT: Positive for nosebleeds.    Eyes: Negative for pain and redness.   Respiratory: Negative for cough and shortness of breath.    Cardiovascular: Negative for chest pain and leg swelling.   Gastrointestinal: Negative for abdominal distention and abdominal pain.   Endocrine: Negative for polyphagia and polyuria.   Genitourinary: Negative for dysuria and flank pain.   Musculoskeletal: Negative for back pain and gait problem.   Skin: Negative for rash.    Neurological: Negative for speech difficulty and numbness.   Psychiatric/Behavioral: Negative for confusion and hallucinations.     Objective:     Vital Signs (Most Recent):  Temp: 98.4 °F (36.9 °C) (06/27/18 1555)  Pulse: 67 (06/27/18 1555)  Resp: 18 (06/27/18 1555)  BP: (!) 178/74 (06/27/18 1555)  SpO2: 97 % (06/27/18 1555) Vital Signs (24h Range):  Temp:  [97.8 °F (36.6 °C)-98.4 °F (36.9 °C)] 98.4 °F (36.9 °C)  Pulse:  [62-67] 67  Resp:  [16-18] 18  SpO2:  [93 %-97 %] 97 %  BP: (135-178)/(61-74) 178/74     Weight: 63.5 kg (139 lb 15.9 oz)  Body mass index is 24.8 kg/m².    Physical Exam   Constitutional: She is oriented to person, place, and time. She appears well-developed and well-nourished.   HENT:   Head: Normocephalic and atraumatic.   Right Ear: External ear normal.   Left Ear: External ear normal.   Mouth/Throat: Oropharynx is clear and moist.   Small amount of dried blood in nare   Eyes: Conjunctivae and EOM are normal. Pupils are equal, round, and reactive to light.   Neck: Normal range of motion. Neck supple.   Cardiovascular: Normal rate, regular rhythm, normal heart sounds and intact distal pulses.    No murmur heard.  Pulmonary/Chest: Effort normal and breath sounds normal. She has no wheezes.   Abdominal: Soft. Bowel sounds are normal. There is no tenderness.   Musculoskeletal: Normal range of motion.   Neurological: She is alert and oriented to person, place, and time. Coordination normal.   Skin: Skin is warm and dry.   Psychiatric: She has a normal mood and affect. Her behavior is normal. Judgment normal.   Nursing note and vitals reviewed.        CRANIAL NERVES     CN III, IV, VI   Pupils are equal, round, and reactive to light.  Extraocular motions are normal.        Significant Labs:   BMP:   Recent Labs  Lab 06/27/18  1139   *   *   K 5.9*      CO2 23   BUN 43*   CREATININE 1.6*   CALCIUM 9.4     CBC:   Recent Labs  Lab 06/27/18  1139   WBC 8.20   HGB 8.9*   HCT 26.6*           Significant Imaging: I have reviewed and interpreted all pertinent imaging results/findings within the past 24 hours.    Assessment/Plan:     * Hyperkalemia    Kayexalate 15 Gm given in ED.   Normal saline 1 liter at 125 cc/hr  Renal diet.   Trend K.           Epistaxis    Improved. Monitor for further bleeding          Acute pulmonary embolism    Recent diagnosis. Resume home eliquis        COPD (chronic obstructive pulmonary disease)    History to tobacco use.   Resume home bronchodilator.           Type 2 diabetes mellitus with stage 3 chronic kidney disease    Diet controlled DM.   Renal function at baseline with Cr 1.6        Essential hypertension    Chronic. Resume home antihypertensives            VTE Risk Mitigation         Ordered     apixaban tablet 5 mg  2 times daily      06/27/18 7548             Sadie Saeed NP  Department of Hospital Medicine   [facility]

## 2018-06-27 NOTE — HOSPITAL COURSE
Patient was monitored during her observation stay. She received Kayexalate in ED and gentle IV hydration. Her potassium level monitored and trended downward back to WNL. Patient was educated on a low potassium diet. She had no further signs of epistaxis. Her renal status remained stable and she was discharged to home. She was to follow up with her PCP in one week with to have a BMP 1-2 days prior to appointment. Patient declined Home Health.

## 2018-06-27 NOTE — NURSING
Pt admitted to unit from ER.  Initial assessment completed per flow sheet.  Vitals stable.  Denies pain and discomfort at this time.  Side rails up times three, bed low and locked, call light within reach.

## 2018-06-27 NOTE — ASSESSMENT & PLAN NOTE
Kayexalate 15 Gm given in ED.   Normal saline 1 liter at 125 cc/hr  Renal diet.   Rufina PÉREZ.

## 2018-06-28 VITALS
RESPIRATION RATE: 18 BRPM | SYSTOLIC BLOOD PRESSURE: 159 MMHG | OXYGEN SATURATION: 99 % | TEMPERATURE: 98 F | HEIGHT: 63 IN | DIASTOLIC BLOOD PRESSURE: 67 MMHG | BODY MASS INDEX: 24.8 KG/M2 | WEIGHT: 140 LBS | HEART RATE: 66 BPM

## 2018-06-28 PROBLEM — K59.00 CONSTIPATION: Status: ACTIVE | Noted: 2018-06-28

## 2018-06-28 PROBLEM — R04.0 EPISTAXIS: Status: RESOLVED | Noted: 2018-06-27 | Resolved: 2018-06-28

## 2018-06-28 PROBLEM — E44.0 MODERATE MALNUTRITION: Status: ACTIVE | Noted: 2018-06-28

## 2018-06-28 PROBLEM — R53.81 DEBILITY: Status: ACTIVE | Noted: 2018-06-28

## 2018-06-28 PROBLEM — E87.1 HYPONATREMIA: Status: ACTIVE | Noted: 2018-06-28

## 2018-06-28 PROBLEM — N17.9 AKI (ACUTE KIDNEY INJURY): Status: ACTIVE | Noted: 2018-06-28

## 2018-06-28 PROBLEM — Z79.01 CHRONIC ANTICOAGULATION: Status: ACTIVE | Noted: 2018-06-28

## 2018-06-28 PROBLEM — N17.9 AKI (ACUTE KIDNEY INJURY): Status: RESOLVED | Noted: 2018-06-28 | Resolved: 2018-06-28

## 2018-06-28 PROBLEM — E87.1 HYPONATREMIA: Status: RESOLVED | Noted: 2018-06-28 | Resolved: 2018-06-28

## 2018-06-28 PROBLEM — E87.5 HYPERKALEMIA: Status: RESOLVED | Noted: 2017-10-15 | Resolved: 2018-06-28

## 2018-06-28 PROBLEM — R53.81 DEBILITY: Status: RESOLVED | Noted: 2018-06-28 | Resolved: 2018-06-28

## 2018-06-28 LAB
ANION GAP SERPL CALC-SCNC: 6 MMOL/L
BASOPHILS # BLD AUTO: 0 K/UL
BASOPHILS NFR BLD: 0.2 %
BUN SERPL-MCNC: 36 MG/DL
CALCIUM SERPL-MCNC: 8.8 MG/DL
CHLORIDE SERPL-SCNC: 109 MMOL/L
CO2 SERPL-SCNC: 24 MMOL/L
CREAT SERPL-MCNC: 1.2 MG/DL
DIFFERENTIAL METHOD: ABNORMAL
EOSINOPHIL # BLD AUTO: 0 K/UL
EOSINOPHIL NFR BLD: 0 %
ERYTHROCYTE [DISTWIDTH] IN BLOOD BY AUTOMATED COUNT: 13.3 %
EST. GFR  (AFRICAN AMERICAN): 47 ML/MIN/1.73 M^2
EST. GFR  (NON AFRICAN AMERICAN): 41 ML/MIN/1.73 M^2
GLUCOSE SERPL-MCNC: 135 MG/DL
HCT VFR BLD AUTO: 25.8 %
HGB BLD-MCNC: 8.6 G/DL
LYMPHOCYTES # BLD AUTO: 0.8 K/UL
LYMPHOCYTES NFR BLD: 11 %
MCH RBC QN AUTO: 32.1 PG
MCHC RBC AUTO-ENTMCNC: 33.3 G/DL
MCV RBC AUTO: 97 FL
MONOCYTES # BLD AUTO: 0.5 K/UL
MONOCYTES NFR BLD: 6.4 %
NEUTROPHILS # BLD AUTO: 5.9 K/UL
NEUTROPHILS NFR BLD: 82.4 %
PLATELET # BLD AUTO: 152 K/UL
PMV BLD AUTO: 9.6 FL
POCT GLUCOSE: 123 MG/DL (ref 70–110)
POTASSIUM SERPL-SCNC: 5 MMOL/L
RBC # BLD AUTO: 2.67 M/UL
SODIUM SERPL-SCNC: 139 MMOL/L
WBC # BLD AUTO: 7.1 K/UL

## 2018-06-28 PROCEDURE — 99900035 HC TECH TIME PER 15 MIN (STAT)

## 2018-06-28 PROCEDURE — 36415 COLL VENOUS BLD VENIPUNCTURE: CPT

## 2018-06-28 PROCEDURE — 94761 N-INVAS EAR/PLS OXIMETRY MLT: CPT

## 2018-06-28 PROCEDURE — 80048 BASIC METABOLIC PNL TOTAL CA: CPT

## 2018-06-28 PROCEDURE — G0378 HOSPITAL OBSERVATION PER HR: HCPCS

## 2018-06-28 PROCEDURE — 25000003 PHARM REV CODE 250: Performed by: NURSE PRACTITIONER

## 2018-06-28 PROCEDURE — 85025 COMPLETE CBC W/AUTO DIFF WBC: CPT

## 2018-06-28 PROCEDURE — 25000003 PHARM REV CODE 250: Performed by: EMERGENCY MEDICINE

## 2018-06-28 PROCEDURE — 27000221 HC OXYGEN, UP TO 24 HOURS

## 2018-06-28 RX ORDER — BISACODYL 10 MG
10 SUPPOSITORY, RECTAL RECTAL ONCE
Status: DISCONTINUED | OUTPATIENT
Start: 2018-06-28 | End: 2018-06-28 | Stop reason: HOSPADM

## 2018-06-28 RX ORDER — ADHESIVE BANDAGE
30 BANDAGE TOPICAL ONCE
Status: DISCONTINUED | OUTPATIENT
Start: 2018-06-28 | End: 2018-06-28 | Stop reason: HOSPADM

## 2018-06-28 RX ORDER — DOCUSATE SODIUM 100 MG/1
100 CAPSULE, LIQUID FILLED ORAL DAILY
Status: DISCONTINUED | OUTPATIENT
Start: 2018-06-29 | End: 2018-06-28 | Stop reason: HOSPADM

## 2018-06-28 RX ADMIN — FERROUS SULFATE TAB EC 325 MG (65 MG FE EQUIVALENT) 325 MG: 325 (65 FE) TABLET DELAYED RESPONSE at 09:06

## 2018-06-28 RX ADMIN — LEVOTHYROXINE SODIUM 100 MCG: 100 TABLET ORAL at 05:06

## 2018-06-28 RX ADMIN — LOSARTAN POTASSIUM 100 MG: 25 TABLET ORAL at 09:06

## 2018-06-28 RX ADMIN — AMLODIPINE BESYLATE 10 MG: 5 TABLET ORAL at 09:06

## 2018-06-28 RX ADMIN — APIXABAN 5 MG: 2.5 TABLET, FILM COATED ORAL at 09:06

## 2018-06-28 NOTE — PLAN OF CARE
Problem: Patient Care Overview  Goal: Plan of Care Review  Outcome: Outcome(s) achieved Date Met: 06/28/18  Pt medication and discharge instructions given and reviewed, understanding verbalized.  Gave pt Rx for BNP.  VSS, Pt in NAD, denies pain and discomfort at this time.  IV and tele removed.  Pt left unit via wheelchair.  Discharged home with family.

## 2018-06-28 NOTE — PLAN OF CARE
Problem: Patient Care Overview  Goal: Plan of Care Review  Outcome: Ongoing (interventions implemented as appropriate)  POC reviewed with pt, verbalized understanding. Pt tolerated evening medications well. Up with assist to restroom. Sleeping through the night. NAD noted. Bed in lowest position. Call light in reach. Will continue to monitor.

## 2018-06-28 NOTE — DISCHARGE INSTRUCTIONS
Thank you for choosing [facility] for your medical care. The primary doctor who is taking care of you at the time of your discharge is Venancio Zamarripa MD.     You were admitted to the hospital with Hyperkalemia.     Please note your discharge instructions, including diet/activity restrictions, follow-up appointments, and medication changes.  If you have any questions about your medical issues, prescriptions, or any other questions, please feel free to contact the [facility] Medicine Dept at [phone] and we will help.    Please direct all long term medication refills and follow up to your primary care provider, Eli Edmonds MD. Thank you again for letting us take care of your health care needs.    Please note the following discharge instructions per your discharging physician-  Jennifer Portillo NP

## 2018-06-28 NOTE — DISCHARGE SUMMARY
"[facility]  Hospital Medicine  Discharge Summary      Patient Name: Blaire Cage  MRN: [id number]  Admission Date: 6/27/2018  Hospital Length of Stay: 0 days  Discharge Date and Time:  06/28/2018 1:36 PM  Attending Physician: Venancio Zamarripa MD   Discharging Provider: JUAN C Guevara  Primary Care Provider: Eli Edmonds MD    HPI:   Blaire Cage is a 87 y.o. female with a PMH Of hypertensin, DM type 2, COPD, and recent diagnosis of pulmonary emboli who presents to the ED for evaluation of epistaxis since yesterday. She reports several episodes of epistaxisthat resolved on their own with pressure. She states she became worried "when blood out both nostril and one side had a long string." She denies injury or pain. She reports feeling fatigued and having decreased appetite for a couple of days.  Denies chest pain and shortness of breath. On laboratory data in the ED, she was noted to have hyperkalemia and was placed in hospital for further evaluation and treatment.    * No surgery found *      Hospital Course:   Patient was monitored during her observation stay. She received Kayexalate in ED and gentle IV hydration. Her potassium level monitored and trended downward back to WNL. Patient was educated on a low potassium diet. She had no further signs of epistaxis. Her renal status remained stable and she was discharged to home. She was to follow up with her PCP in one week with to have a BMP 1-2 days prior to appointment. Patient declined Home Health.      Consults: None    Service: Hospital Medicine    Final Active Diagnoses:    Diagnosis Date Noted POA    Moderate malnutrition [E44.0] 06/28/2018 Yes    Uncontrolled type 2 diabetes mellitus with kidney complication, without long-term current use of insulin [E11.29, E11.65] 06/28/2018 Yes    Chronic anticoagulation [Z79.01] 06/28/2018 Not Applicable    Constipation [K59.00] 06/28/2018 Yes    Acute pulmonary embolism [I26.99] " 06/09/2018 Yes    COPD (chronic obstructive pulmonary disease) [J44.9] 01/08/2015 Yes    Type 2 diabetes mellitus with stage 3 chronic kidney disease [E11.22, N18.3] 01/18/2013 Yes    Hypertension associated with diabetes [E11.59, I10] 01/18/2013 Yes      Problems Resolved During this Admission:    Diagnosis Date Noted Date Resolved POA    PRINCIPAL PROBLEM:  Hyperkalemia [E87.5] 10/15/2017 06/28/2018 Yes    SHALINI (acute kidney injury) [N17.9] 06/28/2018 06/28/2018 Yes    Hyponatremia [E87.1] 06/28/2018 06/28/2018 Yes    Debility [R53.81] 06/28/2018 06/28/2018 Yes    Epistaxis [R04.0] 06/27/2018 06/28/2018 Yes       Discharged Condition: stable    Disposition: Home or Self Care    Follow Up:  Follow-up Information     Eli Edmonds MD In 1 week.    Specialty:  Family Medicine  Why:  Do lab work- BMP 1-2 days prior to appointment to check potassium level  Contact information:  [address] [facility] [address]  [phone]                 Patient Instructions:     Diet Cardiac   Order Comments: Low potassium diet     Notify your health care provider if you experience any of the following:  difficulty breathing or increased cough     Notify your health care provider if you experience any of the following:  persistent dizziness, light-headedness, or visual disturbances     Notify your health care provider if you experience any of the following:  increased confusion or weakness     Notify your health care provider if you experience any of the following:   Order Comments: Any decline in condition     Activity as tolerated   Order Comments: Fall precautions       Significant Diagnostic Studies:     HGA1C 6.8    X-Ray Chest PA And Lateral- Trace bilateral pleural effusions new compared to the prior exam.  Right basilar atelectasis appearing mildly increased right lung base.  Note is made infiltrate could also be present right lung base as before.    Labs:   CMP   Recent Labs  Lab 06/27/18  1139 06/28/18  0300   NA  135* 139   K 5.9* 5.0    109   CO2 23 24   * 135*   BUN 43* 36*   CREATININE 1.6* 1.2   CALCIUM 9.4 8.8   PROT 6.0  --    ALBUMIN 2.8*  --    BILITOT 0.3  --    ALKPHOS 64  --    AST 16  --    ALT 18  --    ANIONGAP 6* 6*   ESTGFRAFRICA 33* 47*   EGFRNONAA 29* 41*   , CBC   Recent Labs  Lab 06/27/18  1139 06/28/18  0300   WBC 8.20 7.10   HGB 8.9* 8.6*   HCT 26.6* 25.8*    152    and INR   Lab Results   Component Value Date    INR 1.0 09/11/2017    INR 0.9 08/24/2016    INR 1.0 06/05/2016       Pending Diagnostic Studies:     None         Medications:  Reconciled Home Medications:      Medication List      ASK your doctor about these medications    albuterol-ipratropium 2.5 mg-0.5 mg/3 mL nebulizer solution  Commonly known as:  DUO-NEB  Take 3 mLs by nebulization every 6 (six) hours while awake. Rescue     amLODIPine 10 MG tablet  Commonly known as:  NORVASC  Take 1 tablet (10 mg total) by mouth once daily.     apixaban 5 mg Tab  Take 1 tablet (5 mg total) by mouth 2 (two) times daily.     aspirin 81 MG EC tablet  Commonly known as:  ECOTRIN  Take 81 mg by mouth once daily.     calcium carbonate 500 mg calcium (1,250 mg) tablet  Commonly known as:  OS-TASIA  Take 1 tablet by mouth 2 (two) times daily.     cloNIDine 0.1 MG tablet  Commonly known as:  CATAPRES  Take 1 tablet (0.1 mg total) by mouth 2 (two) times daily. Take one tablet by mouth every hour as needed for systolic blood pressure greater than 180.  Do not take more than 3 tablets in 24 hours.     ferrous sulfate 325 mg (65 mg iron) Tab tablet  Take 1 tablet (325 mg total) by mouth 2 (two) times daily.     fish oil-omega-3 fatty acids 300-1,000 mg capsule  Take 2 g by mouth every evening.     fluticasone 50 mcg/actuation nasal spray  Commonly known as:  FLONASE  2 sprays by Each Nare route once daily.     gabapentin 300 MG capsule  Commonly known as:  NEURONTIN  Take 1 capsule (300 mg total) by mouth every evening.     guanFACINE 1 MG  Tab  Commonly known as:  TENEX  TAKE 1 TABLET BY MOUTH IN THE EVENING     levothyroxine 100 MCG tablet  Commonly known as:  SYNTHROID  Take 1 tablet (100 mcg total) by mouth before breakfast.     losartan 100 MG tablet  Commonly known as:  COZAAR  Take 1 tablet (100 mg total) by mouth once daily.     magnesium oxide 400 mg tablet  Commonly known as:  MAG-OX  take by mouth once daily     montelukast 10 mg tablet  Commonly known as:  SINGULAIR  Take 1 tablet (10 mg total) by mouth every evening.     nitroGLYCERIN 0.4 MG SL tablet  Commonly known as:  NITROSTAT  Place 1 tablet (0.4 mg total) under the tongue every 5 (five) minutes as needed for Chest pain. As needed     sertraline 25 MG tablet  Commonly known as:  ZOLOFT  Take 1 tablet (25 mg total) by mouth once daily.     simvastatin 40 MG tablet  Commonly known as:  ZOCOR  TAKE ONE TABLET BY MOUTH ONCE DAILY IN THE EVENING     traMADol 50 mg tablet  Commonly known as:  ULTRAM  Take 1 tablet (50 mg total) by mouth every 6 (six) hours as needed for Pain.     VITAMIN C 500 MG tablet  Generic drug:  ascorbic acid (vitamin C)  Take 500 mg by mouth once daily.     vitamin D 1000 units Tab  Take 1 tablet (1,000 Units total) by mouth once daily.            Indwelling Lines/Drains at time of discharge:   Lines/Drains/Airways          No matching active lines, drains, or airways        Time spent on the discharge of patient: 48 minutes  Patient was seen and examined on the date of discharge and determined to be suitable for discharge.       Jennifer Portillo, JUAN C  Department of Hospital Medicine  [facility]

## 2018-06-28 NOTE — PLAN OF CARE
06/28/18 1402   Final Note   Assessment Type Final Discharge Note   Discharge Disposition Home

## 2018-06-28 NOTE — PLAN OF CARE
PCP is Dr Edmonds.  Verified insurance as [employer].  Pharmacy is [facility] on [address].  Lives alone; independent with ADL's.  Pt's daughters live nearby and drive patient as needed.  Denies HH.  Discharge plan is home; no needs.       06/28/18 1132   Discharge Assessment   Assessment Type Discharge Planning Assessment   Confirmed/corrected address and phone number on facesheet? Yes   Assessment information obtained from? Patient   Prior to hospitilization cognitive status: Alert/Oriented   Prior to hospitalization functional status: Independent   Current cognitive status: Alert/Oriented   Current Functional Status: Independent   Lives With alone   Able to Return to Prior Arrangements yes   Is patient able to care for self after discharge? Yes   Patient's perception of discharge disposition home or selfcare   Patient currently being followed by outpatient case management? No   Patient currently receives any other outside agency services? No   Equipment Currently Used at Home glucometer;nebulizer;walker, rolling   Do you have any problems affording any of your prescribed medications? No   Is the patient taking medications as prescribed? yes   Does the patient have transportation home? Yes   Transportation Available family or friend will provide   Does the patient receive services at the [facility]? No   Discharge Plan A Home   Patient/Family In Agreement With Plan yes

## 2018-06-29 ENCOUNTER — TELEPHONE (OUTPATIENT)
Dept: MEDSURG UNIT | Facility: HOSPITAL | Age: 83
End: 2018-06-29

## 2018-06-29 ENCOUNTER — OFFICE VISIT (OUTPATIENT)
Dept: HEMATOLOGY/ONCOLOGY | Facility: CLINIC | Age: 83
End: 2018-06-29
Payer: MEDICARE

## 2018-06-29 VITALS
WEIGHT: 136.31 LBS | BODY MASS INDEX: 25.08 KG/M2 | SYSTOLIC BLOOD PRESSURE: 148 MMHG | TEMPERATURE: 98 F | HEIGHT: 62 IN | RESPIRATION RATE: 18 BRPM | HEART RATE: 61 BPM | DIASTOLIC BLOOD PRESSURE: 65 MMHG

## 2018-06-29 DIAGNOSIS — D53.9 NUTRITIONAL ANEMIA: ICD-10-CM

## 2018-06-29 DIAGNOSIS — D64.89 ANEMIA DUE TO MULTIPLE MECHANISMS: ICD-10-CM

## 2018-06-29 DIAGNOSIS — D64.9 ANEMIA, UNSPECIFIED TYPE: Primary | ICD-10-CM

## 2018-06-29 DIAGNOSIS — N18.30 ANEMIA, CHRONIC RENAL FAILURE, STAGE 3 (MODERATE): ICD-10-CM

## 2018-06-29 DIAGNOSIS — D50.0 IRON DEFICIENCY ANEMIA DUE TO CHRONIC BLOOD LOSS: ICD-10-CM

## 2018-06-29 DIAGNOSIS — D63.8 ANEMIA, CHRONIC DISEASE: ICD-10-CM

## 2018-06-29 DIAGNOSIS — D64.9 NORMOCYTIC NORMOCHROMIC ANEMIA: ICD-10-CM

## 2018-06-29 DIAGNOSIS — I26.99 PE (PULMONARY THROMBOEMBOLISM): ICD-10-CM

## 2018-06-29 DIAGNOSIS — I82.4Y2 DEEP VEIN THROMBOSIS (DVT) OF PROXIMAL VEIN OF LEFT LOWER EXTREMITY, UNSPECIFIED CHRONICITY: ICD-10-CM

## 2018-06-29 DIAGNOSIS — D63.1 ANEMIA, CHRONIC RENAL FAILURE, STAGE 3 (MODERATE): ICD-10-CM

## 2018-06-29 PROBLEM — I82.409 DVT (DEEP VENOUS THROMBOSIS): Status: ACTIVE | Noted: 2018-06-09

## 2018-06-29 PROCEDURE — 99203 OFFICE O/P NEW LOW 30 MIN: CPT | Mod: ,,, | Performed by: INTERNAL MEDICINE

## 2018-06-29 PROCEDURE — 1111F DSCHRG MED/CURRENT MED MERGE: CPT | Mod: ,,, | Performed by: INTERNAL MEDICINE

## 2018-06-29 NOTE — LETTER
June 29, 2018      Eli Edmonds MD  [address]  [place] [address]           [facility]  [address]  [address]  [place] [address]  Phone: [phone]  Fax: [phone]          Patient: Blaire Cage   MR Number: [id number]   YOB: 1930   Date of Visit: 6/29/2018       Dear Dr. Eli Edmonds:    Thank you for referring Blaire Cage to me for evaluation. Attached you will find relevant portions of my assessment and plan of care.    If you have questions, please do not hesitate to call me. I look forward to following Blaire Cage along with you.    Sincerely,    Issca Alvarez MD    Enclosure  CC:  No Recipients    If you would like to receive this communication electronically, please contact [email or web address] or [phone] to request more information on [facility] access.    For providers and/or their staff who would like to refer a patient to [facility], please contact us through our one-stop-shop provider referral line, [facility], at [phone].    If you feel you have received this communication in error or would no longer like to receive these types of communications, please e-mail [email or web address]

## 2018-06-29 NOTE — PROGRESS NOTES
[facility] Hematolgy/Oncology  History & Physical    Subjective:      Patient ID:   NAME: Blaire Cage : 1930     87 y.o. female    Referring Doc: Sandro  Other Physicians: Cande Garg (PA); Eli Edmonds (PCP); Jeffrey Christopher (PULm); Sandra (GI)        Chief Complaint: DVT and PE      HPI:  87 y.o. female with diagnosis of a recent LLE DVT and Pulmonary emboli who has been referred by Dr Jo with Neph for evaluation by medical hematology/oncology. She was hospitalized [facility]. She is here by herself, She uses a walker to ambulate. She is a poor historian. She is having some fatigue. She is breathing ok. She is eating ok. The swelling in the LLE has resolved. She is currently on Eliquis oral anticoagulant. She has never had clots before nor does she have knowledge of any family history of clots.               ROS:   GEN: normal without any fever, night sweats or weight loss  HEENT: normal with no HA's, sore throat, stiff neck, changes in vision  CV: normal with no CP, SOB, PND, WEST or orthopnea  PULM: normal with no SOB, cough, hemoptysis, sputum or pleuritic pain  GI: normal with no abdominal pain, nausea, vomiting, constipation, diarrhea, melanotic stools, BRBPR, or hematemesis  : normal with no hematuria, dysuria  BREAST: normal with no mass, discharge, pain  SKIN: normal with no rash, erythema, bruising, or swelling       Past Medical/Surgical History:  Past Medical History:   Diagnosis Date    Anticoagulant long-term use     aspirin    COPD (chronic obstructive pulmonary disease)     COPD with acute exacerbation 2015    Diabetes mellitus type II     DVT (deep venous thrombosis) 2018    Encounter for blood transfusion     Hip arthritis 3/1/2016    Hyperlipidemia     Hypertension     PE (pulmonary thromboembolism) 2018    Pneumonia of right lower lobe due to infectious organism 2017    Thyroid disease     hypothyroid     Past Surgical History:   Procedure Laterality Date     APPENDECTOMY      FRACTURE SURGERY      right hip     HERNIA REPAIR      groin    HYSTERECTOMY      VARICOSE VEIN SURGERY           Allergies:  Review of patient's allergies indicates:   Allergen Reactions    Carvedilol Other (See Comments)     Bradycardia and syncope    Boniva [ibandronate]     Codeine     Hydralazine analogues     Iodinated contrast- oral and iv dye Hives    Kionex [sodium polystyrene sulfonate] Diarrhea     From encounter 12/11/17 for diarrhea--not true allergy.    Lisinopril     Morphine        Social/Family History:  Social History     Social History    Marital status: Legally      Spouse name: N/A    Number of children: N/A    Years of education: N/A     Occupational History    Not on file.     Social History Main Topics    Smoking status: Former Smoker     Packs/day: 0.35     Years: 44.00     Types: Cigarettes     Quit date: 4/30/2015    Smokeless tobacco: Never Used      Comment: 1/08/2015    Alcohol use No    Drug use: No    Sexual activity: No     Other Topics Concern    Not on file     Social History Narrative    No narrative on file     Family History   Problem Relation Age of Onset    Hypertension Mother     Diabetes Sister     Diabetes Brother     Kidney disease Son          Medications:    Current Outpatient Prescriptions:     albuterol-ipratropium 2.5mg-0.5mg/3mL (DUO-NEB) 0.5 mg-3 mg(2.5 mg base)/3 mL nebulizer solution, Take 3 mLs by nebulization every 6 (six) hours while awake. Rescue, Disp: 180 mL, Rfl: prn    amlodipine (NORVASC) 10 MG tablet, Take 1 tablet (10 mg total) by mouth once daily., Disp: 90 tablet, Rfl: 3    apixaban 5 mg Tab, Take 1 tablet (5 mg total) by mouth 2 (two) times daily., Disp: 60 tablet, Rfl: 3    ascorbic acid (VITAMIN C) 500 MG tablet, Take 500 mg by mouth once daily.  , Disp: , Rfl:     aspirin (ECOTRIN) 81 MG EC tablet, Take 81 mg by mouth once daily.  , Disp: , Rfl:     calcium carbonate (OS-TASIA) 500 mg  calcium (1,250 mg) tablet, Take 1 tablet by mouth 2 (two) times daily.  , Disp: , Rfl:     cloNIDine (CATAPRES) 0.1 MG tablet, Take 1 tablet (0.1 mg total) by mouth 2 (two) times daily. Take one tablet by mouth every hour as needed for systolic blood pressure greater than 180.  Do not take more than 3 tablets in 24 hours., Disp: 90 tablet, Rfl: 3    ferrous sulfate 325 mg (65 mg iron) Tab tablet, Take 1 tablet (325 mg total) by mouth 2 (two) times daily., Disp: 180 tablet, Rfl: 3    fish oil-omega-3 fatty acids 300-1,000 mg capsule, Take 2 g by mouth every evening. , Disp: , Rfl:     fluticasone (FLONASE) 50 mcg/actuation nasal spray, 2 sprays by Each Nare route once daily., Disp: 48 g, Rfl: 3    gabapentin (NEURONTIN) 300 MG capsule, Take 1 capsule (300 mg total) by mouth every evening., Disp: 90 capsule, Rfl: 3    guanFACINE (TENEX) 1 MG Tab, TAKE 1 TABLET BY MOUTH IN THE EVENING, Disp: 90 tablet, Rfl: 3    levothyroxine (SYNTHROID) 100 MCG tablet, Take 1 tablet (100 mcg total) by mouth before breakfast., Disp: 90 tablet, Rfl: 3    losartan (COZAAR) 100 MG tablet, Take 1 tablet (100 mg total) by mouth once daily., Disp: 90 tablet, Rfl: 3    magnesium oxide (MAG-OX) 400 mg tablet, take by mouth once daily (Patient taking differently: Take 400 mg by mouth once daily. take by mouth once daily ), Disp: 90 tablet, Rfl: 3    montelukast (SINGULAIR) 10 mg tablet, Take 1 tablet (10 mg total) by mouth every evening., Disp: 90 tablet, Rfl: 3    nitroGLYCERIN (NITROSTAT) 0.4 MG SL tablet, Place 1 tablet (0.4 mg total) under the tongue every 5 (five) minutes as needed for Chest pain. As needed (Patient taking differently: Place 0.4 mg under the tongue every 5 (five) minutes as needed for Chest pain. Seek medical help if pain is not relieved after the third dose.), Disp: 30 tablet, Rfl: 3    sertraline (ZOLOFT) 25 MG tablet, Take 1 tablet (25 mg total) by mouth once daily., Disp: 30 tablet, Rfl: 0    simvastatin  "(ZOCOR) 40 MG tablet, TAKE ONE TABLET BY MOUTH ONCE DAILY IN THE EVENING, Disp: 90 tablet, Rfl: 0    traMADol (ULTRAM) 50 mg tablet, Take 1 tablet (50 mg total) by mouth every 6 (six) hours as needed for Pain., Disp: 12 tablet, Rfl: 0    vitamin D 1000 units Tab, Take 1 tablet (1,000 Units total) by mouth once daily., Disp: 90 tablet, Rfl: 3  No current facility-administered medications for this visit.       Pathology:  Cancer Staging  No matching staging information was found for the patient.      Objective:   Vitals:  Blood pressure (!) 148/65, pulse 61, temperature 98 °F (36.7 °C), resp. rate 18, height 5' 2" (1.575 m), weight 61.8 kg (136 lb 4.8 oz).    Physical Examination:   GEN: no apparent distress, comfortable; AAOx3  HEAD: atraumatic and normocephalic  EYES: no pallor, no icterus, PERRLA  ENT: OMM, no pharyngeal erythema, external ears WNL; no nasal discharge; no thrush  NECK: no masses, thyroid normal, trachea midline, no LAD/LN's, supple  CV: RRR with no murmur; normal pulse; normal S1 and S2; no pedal edema  CHEST: Normal respiratory effort; CTAB; normal breath sounds; no wheeze or crackles  ABDOM: nontender and nondistended; soft; normal bowel sounds; no rebound/guarding  MUSC/Skeletal: ROM normal; no crepitus; joints normal; no deformities or arthropathy; uses walker  EXTREM: no clubbing, cyanosis, inflammation or swelling  SKIN: no rashes, lesions, ulcers, petechiae or subcutaneous nodules; vitiligo   : no carter  NEURO: grossly intact; motor/sensory WNL; AAOx3; no tremors  PSYCH: normal mood, affect and behavior  LYMPH: normal cervical, supraclavicular, axillary and groin LN's      Labs:   Lab Results   Component Value Date    WBC 7.10 06/28/2018    HGB 8.6 (L) 06/28/2018    HCT 25.8 (L) 06/28/2018    MCV 97 06/28/2018     06/28/2018    CMP  Sodium   Date Value Ref Range Status   06/28/2018 139 136 - 145 mmol/L Final     Potassium   Date Value Ref Range Status   06/28/2018 5.0 3.5 - 5.1 " mmol/L Final     Chloride   Date Value Ref Range Status   06/28/2018 109 95 - 110 mmol/L Final     CO2   Date Value Ref Range Status   06/28/2018 24 23 - 29 mmol/L Final     Glucose   Date Value Ref Range Status   06/28/2018 135 (H) 70 - 110 mg/dL Final     BUN, Bld   Date Value Ref Range Status   06/28/2018 36 (H) 8 - 23 mg/dL Final     Creatinine   Date Value Ref Range Status   06/28/2018 1.2 0.5 - 1.4 mg/dL Final   03/04/2013 1.1 0.5 - 1.4 mg/dL Final     Calcium   Date Value Ref Range Status   06/28/2018 8.8 8.7 - 10.5 mg/dL Final   03/04/2013 9.8 8.7 - 10.5 mg/dL Final     Total Protein   Date Value Ref Range Status   06/27/2018 6.0 6.0 - 8.4 g/dL Final     Albumin   Date Value Ref Range Status   06/27/2018 2.8 (L) 3.5 - 5.2 g/dL Final     Total Bilirubin   Date Value Ref Range Status   06/27/2018 0.3 0.1 - 1.0 mg/dL Final     Comment:     For infants and newborns, interpretation of results should be based  on gestational age, weight and in agreement with clinical  observations.  Premature Infant recommended reference ranges:  Up to 24 hours.............<8.0 mg/dL  Up to 48 hours............<12.0 mg/dL  3-5 days..................<15.0 mg/dL  6-29 days.................<15.0 mg/dL       Alkaline Phosphatase   Date Value Ref Range Status   06/27/2018 64 55 - 135 U/L Final   03/04/2013 60 55 - 135 U/L Final     AST   Date Value Ref Range Status   06/27/2018 16 10 - 40 U/L Final   03/04/2013 21 10 - 40 U/L Final     ALT   Date Value Ref Range Status   06/27/2018 18 10 - 44 U/L Final     Anion Gap   Date Value Ref Range Status   06/28/2018 6 (L) 8 - 16 mmol/L Final   03/04/2013 10 5 - 15 meq/L Final     eGFR if    Date Value Ref Range Status   06/28/2018 47 (A) >60 mL/min/1.73 m^2 Final     eGFR if non    Date Value Ref Range Status   06/28/2018 41 (A) >60 mL/min/1.73 m^2 Final     Comment:     Calculation used to obtain the estimated glomerular filtration  rate (eGFR) is the CKD-EPI  equation.            Radiology/Diagnostic Studies:          All lab results and imaging results have been reviewed and discussed with the patient    Assessment:   (1) 87 y.o. female with diagnosis of a recent LLE DVT and Pulmonary emboli who has been referred by Dr Jo with Neph for evaluation by medical hematology/oncology. She was hospitalized [facility]. She has never had any clots before. No fa,shashank hx/of clots.     (2) COPD/asthma; former smoker    (3) Left ventricular dysfunction    (4) HTN and hypercholesterolemia    (5) CRI - stage III    (6) Anemia - most likely a multifactorial process with iron deficiency diagnosis, anemia of chronic disorders, anemia of chronic renal  - hgb is at 8.6    (7) DM and hypothyroidism          Plan:       Anemia, unspecified type    Iron deficiency anemia due to chronic blood loss    Deep vein thrombosis (DVT) of proximal vein of left lower extremity, unspecified chronicity    PE (pulmonary thromboembolism)            PLAN:  1. Order clot workup  2. Order anemia workup  3. Check Stool OBS x3  4. F/u with PULM, PCP, Neph, etc  RTC in  4 weeks   Fax note to Eli Edmonds MD; Guerline Jo        I have explained and the patient understands all of  the current recommendation(s). I have answered all of their questions to the best of my ability and to their complete satisfaction.             Thank you for allowing me to participate in this patient's care. Please call with any questions or concerns.    Electronically signed Issac Alvarez MD

## 2018-07-02 ENCOUNTER — OFFICE VISIT (OUTPATIENT)
Dept: PULMONOLOGY | Facility: CLINIC | Age: 83
End: 2018-07-02
Payer: MEDICARE

## 2018-07-02 VITALS
BODY MASS INDEX: 25.55 KG/M2 | HEIGHT: 62 IN | DIASTOLIC BLOOD PRESSURE: 63 MMHG | SYSTOLIC BLOOD PRESSURE: 140 MMHG | HEART RATE: 64 BPM | WEIGHT: 138.88 LBS | OXYGEN SATURATION: 98 %

## 2018-07-02 DIAGNOSIS — I82.4Y2 DEEP VEIN THROMBOSIS (DVT) OF PROXIMAL VEIN OF LEFT LOWER EXTREMITY, UNSPECIFIED CHRONICITY: ICD-10-CM

## 2018-07-02 DIAGNOSIS — J82.83 EOSINOPHILIC ASTHMA: ICD-10-CM

## 2018-07-02 DIAGNOSIS — J44.89 ASTHMA-COPD OVERLAP SYNDROME: ICD-10-CM

## 2018-07-02 DIAGNOSIS — I26.99 OTHER ACUTE PULMONARY EMBOLISM WITHOUT ACUTE COR PULMONALE: Primary | ICD-10-CM

## 2018-07-02 PROCEDURE — 99213 OFFICE O/P EST LOW 20 MIN: CPT | Mod: S$GLB,,, | Performed by: INTERNAL MEDICINE

## 2018-07-02 PROCEDURE — 99999 PR PBB SHADOW E&M-EST. PATIENT-LVL IV: CPT | Mod: PBBFAC,,, | Performed by: INTERNAL MEDICINE

## 2018-07-02 PROCEDURE — 99499 UNLISTED E&M SERVICE: CPT | Mod: S$GLB,,, | Performed by: INTERNAL MEDICINE

## 2018-07-02 RX ORDER — PREDNISONE 20 MG/1
TABLET ORAL
Qty: 18 TABLET | Refills: 0 | Status: SHIPPED | OUTPATIENT
Start: 2018-07-02 | End: 2018-08-20

## 2018-07-02 NOTE — PROGRESS NOTES
"7/2/2018    Blaire Cage       Chief Complaint   Patient presents with    Follow-up       HPI:    July 2, 2018- had pe /dvt dx early June by v/q and u/s.  Took prednisone just once.      Dec 20, 2017Smoked late life, no h/o asthma.  Lives alone with daughters /grandkids in and out.   No 02- off smokes a yr.  Has sinus problems chr with good flonase response.  Mucous is clear with no c/o excess cough and no wheezes.  Breathing felt to be good usually.  Uses resp rx bid.  No hosp for lungs- h/o  Fainting.  Has had very positive result from prednisone.  The chief compliant  problem is new to me",   PFSH:  Past Medical History:   Diagnosis Date    Anemia due to multiple mechanisms 6/29/2018    Anemia, chronic disease 6/29/2018    Anemia, chronic renal failure, stage 3 (moderate) 6/29/2018    Anticoagulant long-term use     aspirin    COPD (chronic obstructive pulmonary disease)     COPD with acute exacerbation 1/9/2015    Diabetes mellitus type II     DVT (deep venous thrombosis) 06/09/2018    Encounter for blood transfusion     Hip arthritis 3/1/2016    Hyperlipidemia     Hypertension     Normocytic normochromic anemia 6/29/2018    PE (pulmonary thromboembolism) 06/09/2018    Pneumonia of right lower lobe due to infectious organism 9/11/2017    Thyroid disease     hypothyroid         Past Surgical History:   Procedure Laterality Date    APPENDECTOMY      FRACTURE SURGERY      right hip     HERNIA REPAIR      groin    HYSTERECTOMY      VARICOSE VEIN SURGERY       Social History   Substance Use Topics    Smoking status: Former Smoker     Packs/day: 0.35     Years: 44.00     Types: Cigarettes     Quit date: 4/30/2015    Smokeless tobacco: Never Used      Comment: 1/08/2015    Alcohol use No     Family History   Problem Relation Age of Onset    Hypertension Mother     Diabetes Sister     Diabetes Brother     Kidney disease Son      Review of patient's allergies indicates:   Allergen " "Reactions    Carvedilol Other (See Comments)     Bradycardia and syncope    Boniva [ibandronate]     Codeine     Hydralazine analogues     Iodinated contrast- oral and iv dye Hives    Kionex [sodium polystyrene sulfonate]     Lisinopril     Morphine        Performance Status:The patient's activity level is functions out of house.      Review of Systems:  a review of eleven systems covering constitutional, Eye, HEENT, Psych, Respiratory, Cardiac, GI, , Musculoskeletal, Endocrine, Dermatologic was negative except for pertinent findings as listed ABOVE and below: all good save above, was on dm rx, on bone rx      Exam:Comprehensive exam done. BP (!) 140/63 (BP Location: Right arm, Patient Position: Sitting)   Pulse 64   Ht 5' 2" (1.575 m)   Wt 63 kg (138 lb 14.2 oz)   LMP  (LMP Unknown)   SpO2 98%   BMI 25.40 kg/m²   Exam included Vitals as listed, and patient's appearance and affect and alertness and mood, oral exam for yeast and hygiene and pharynx lesions and Mallapatti (M) score, neck with inspection for jvd and masses and thyroid abnormalities and lymph nodes (supraclavicular and infraclavicular nodes and axillary also examined and noted if abn), chest exam included symmetry and effort and fremitus and percussion and auscultation, cardiac exam included rhythm and gallops and murmur and rubs and jvd and edema, abdominal exam for mass and hepatosplenomegaly and tenderness and hernias and bowel sounds, Musculoskeletal exam with muscle tone and posture and mobility/gait and  strength, and skin for rashes and cyanosis and pallor and turgor, extremity for clubbing.  Findings were normal except for pertinent findings listed below:  M1, slender, nl pharynx, no neck abn, symmetric chest, nl fremitus/percussion, good bs, RRR with no  Murmur or gallop or rub, no jvd nor edema, noclubbing, alert and moves all 4, euthymic    Radiographs (ct chest and cxr) reviewed: results reviewed  - ct chest viewed and " nad. .lung scan June indeterminate.  Pos leg study early June 2018-  Impression       This represents an intermediate probability V/Q scan for the presence of pulmonary embolism.  Consider additional evaluation with lower extremity venous ultrasound or CTA chest.      Electronically signed by: Aubrey Davis MD  Date: 06/09/2018  Time: 09:08     Impression       1. Interval development of enlargement of the left popliteal vein and nonocclusive peripheral thrombus within the left popliteal vein focally.  2. Complete thrombosis of a superficial venous varicosity in the left popliteal fossa.  3. Venous reflux within the left popliteal vein is a result of an incompetent venous valve.  This report was flagged in Epic as abnormal.      Electronically signed by: Aubrey Davis MD  Date: 06/09/2018  Time: 15:47       Labs none available      eos have been up to 400!  PFT results reviewed  10/30/2017- fev 52% 0.82 with 12% bd response.    Plan:  Clinical impression is apparently straight forward and impression with management as below.    Blaire was seen today for follow-up.    Diagnoses and all orders for this visit:    Other acute pulmonary embolism without acute cor pulmonale    Deep vein thrombosis (DVT) of proximal vein of left lower extremity, unspecified chronicity    Eosinophilic asthma  -     predniSONE (DELTASONE) 20 MG tablet; Take one daily  For 3 and repeat for bronchitis.    Asthma-COPD overlap syndrome  -     predniSONE (DELTASONE) 20 MG tablet; Take one daily  For 3 and repeat for bronchitis.        Follow-up in about 6 months (around 1/2/2019), or if symptoms worsen or fail to improve.    Discussed with patient above for education the following:      Use prednisone if needed. Lungs are doing well,     You had had foot swelling but no symptoms for 2 weeks- blood clot in leg went to lung.      Stay on blood thinners, usually 6 months minimum course, may be wise to stay on therapy?

## 2018-07-02 NOTE — PATIENT INSTRUCTIONS
Use prednisone if needed. Lungs are doing well,     You had had foot swelling but no symptoms for 2 weeks- blood clot in leg went to lung.      Stay on blood thinners, usually 6 months minimum course, may be wise to stay on therapy?

## 2018-07-10 LAB — FOLATE SERPL-MCNC: 17.7 NG/ML

## 2018-07-11 LAB
AT III ACT/NOR PPP CHRO: 156 % NORMAL (ref 80–120)
AT III AG PPP IA-MCNC: NORMAL MG/DL
F2 C.20210G>A GENO BLD/T: NORMAL
FACT IX ACT/NOR PPP: 131 % NORMAL (ref 60–160)
FACT VII ACT/NOR PPP: 132 % NORMAL (ref 60–150)
FACT VIII ACT/NOR PPP: 116 % NORMAL (ref 50–180)
FERRITIN SERPL-MCNC: 176 NG/ML (ref 20–288)
HCYS SERPL-SCNC: 20.2 UMOL/L
INTERPRETATION SERPL IFE-IMP: NORMAL
INTERPRETATION: NORMAL
IRON SATN MFR SERPL: 32 % (CALC) (ref 11–50)
IRON SERPL-MCNC: 87 MCG/DL (ref 45–160)
KAPPA LC FREE SER-MCNC: 34.1 MG/L (ref 3.3–19.4)
KAPPA LC FREE/LAMBDA FREE SER: 1.53 {RATIO} (ref 0.26–1.65)
LAMBDA LC FREE SERPL-MCNC: 22.3 MG/L (ref 5.7–26.3)
MTHFR GENE MUT ANL BLD/T: NORMAL
PROT C ACT/NOR PPP: 199 % NORMAL (ref 70–180)
PROT C AG ACT/NOR PPP IA: NORMAL % NORMAL
PROT S ACT/NOR PPP: 92 % NORMAL (ref 60–140)
PROT S AG ACT/NOR PPP IA: NORMAL % NORMAL
PS IGA SER-ACNC: <20 U/ML
PS IGG SER-ACNC: <10 U/ML
PS IGM SER-ACNC: <25 U/ML
RETICS #: NORMAL CELLS/UL (ref 20000–80000)
RETICS/RBC NFR AUTO: 1.9 %
SCREEN DRVVT: 38 SECONDS
TIBC SERPL-MCNC: 273 MCG/DL (CALC) (ref 250–450)
VIT B12 SERPL-MCNC: 413 PG/ML (ref 200–1100)

## 2018-07-13 RX ORDER — LANOLIN ALCOHOL/MO/W.PET/CERES
CREAM (GRAM) TOPICAL
Qty: 90 TABLET | Refills: 3 | Status: SHIPPED | OUTPATIENT
Start: 2018-07-13 | End: 2019-09-22 | Stop reason: SDUPTHER

## 2018-07-15 ENCOUNTER — HOSPITAL ENCOUNTER (EMERGENCY)
Facility: HOSPITAL | Age: 83
Discharge: HOME | End: 2018-07-15
Attending: EMERGENCY MEDICINE
Payer: MEDICARE

## 2018-07-15 VITALS
SYSTOLIC BLOOD PRESSURE: 135 MMHG | HEIGHT: 62 IN | RESPIRATION RATE: 16 BRPM | HEART RATE: 58 BPM | TEMPERATURE: 98 F | WEIGHT: 138 LBS | OXYGEN SATURATION: 94 % | BODY MASS INDEX: 25.4 KG/M2 | DIASTOLIC BLOOD PRESSURE: 57 MMHG

## 2018-07-15 DIAGNOSIS — R42 DIZZINESS: ICD-10-CM

## 2018-07-15 DIAGNOSIS — N39.0 URINARY TRACT INFECTION WITHOUT HEMATURIA, SITE UNSPECIFIED: Primary | ICD-10-CM

## 2018-07-15 DIAGNOSIS — R42 VERTIGO: ICD-10-CM

## 2018-07-15 LAB
ALBUMIN SERPL BCP-MCNC: 3.6 G/DL
ALP SERPL-CCNC: 67 U/L
ALT SERPL W/O P-5'-P-CCNC: 23 U/L
ANION GAP SERPL CALC-SCNC: 8 MMOL/L
AST SERPL-CCNC: 24 U/L
BACTERIA #/AREA URNS HPF: ABNORMAL /HPF
BASOPHILS # BLD AUTO: 0.1 K/UL
BASOPHILS NFR BLD: 0.7 %
BILIRUB SERPL-MCNC: 0.3 MG/DL
BILIRUB UR QL STRIP: NEGATIVE
BUN SERPL-MCNC: 41 MG/DL
CALCIUM SERPL-MCNC: 9.3 MG/DL
CHLORIDE SERPL-SCNC: 103 MMOL/L
CLARITY UR: CLEAR
CO2 SERPL-SCNC: 23 MMOL/L
COLOR UR: YELLOW
CREAT SERPL-MCNC: 1.8 MG/DL
DIFFERENTIAL METHOD: ABNORMAL
EOSINOPHIL # BLD AUTO: 0.1 K/UL
EOSINOPHIL NFR BLD: 1 %
ERYTHROCYTE [DISTWIDTH] IN BLOOD BY AUTOMATED COUNT: 14.1 %
EST. GFR  (AFRICAN AMERICAN): 29 ML/MIN/1.73 M^2
EST. GFR  (NON AFRICAN AMERICAN): 25 ML/MIN/1.73 M^2
GLUCOSE SERPL-MCNC: 89 MG/DL
GLUCOSE UR QL STRIP: NEGATIVE
HCT VFR BLD AUTO: 28.8 %
HGB BLD-MCNC: 9.4 G/DL
HGB UR QL STRIP: ABNORMAL
HYALINE CASTS #/AREA URNS LPF: 22 /LPF
KETONES UR QL STRIP: NEGATIVE
LEUKOCYTE ESTERASE UR QL STRIP: ABNORMAL
LYMPHOCYTES # BLD AUTO: 2.4 K/UL
LYMPHOCYTES NFR BLD: 30.5 %
MCH RBC QN AUTO: 31.6 PG
MCHC RBC AUTO-ENTMCNC: 32.8 G/DL
MCV RBC AUTO: 96 FL
MICROSCOPIC COMMENT: ABNORMAL
MONOCYTES # BLD AUTO: 0.8 K/UL
MONOCYTES NFR BLD: 10.6 %
NEUTROPHILS # BLD AUTO: 4.6 K/UL
NEUTROPHILS NFR BLD: 57.2 %
NITRITE UR QL STRIP: POSITIVE
PH UR STRIP: 6 [PH] (ref 5–8)
PLATELET # BLD AUTO: 232 K/UL
PMV BLD AUTO: 8.5 FL
POTASSIUM SERPL-SCNC: 4.8 MMOL/L
PROT SERPL-MCNC: 6.3 G/DL
PROT UR QL STRIP: ABNORMAL
RBC # BLD AUTO: 2.99 M/UL
RBC #/AREA URNS HPF: 39 /HPF (ref 0–4)
SODIUM SERPL-SCNC: 134 MMOL/L
SP GR UR STRIP: 1.01 (ref 1–1.03)
SQUAMOUS #/AREA URNS HPF: 10 /HPF
URN SPEC COLLECT METH UR: ABNORMAL
UROBILINOGEN UR STRIP-ACNC: NEGATIVE EU/DL
WBC # BLD AUTO: 8 K/UL
WBC #/AREA URNS HPF: 12 /HPF (ref 0–5)

## 2018-07-15 PROCEDURE — 80053 COMPREHEN METABOLIC PANEL: CPT

## 2018-07-15 PROCEDURE — 81000 URINALYSIS NONAUTO W/SCOPE: CPT

## 2018-07-15 PROCEDURE — 25000003 PHARM REV CODE 250: Performed by: EMERGENCY MEDICINE

## 2018-07-15 PROCEDURE — 96365 THER/PROPH/DIAG IV INF INIT: CPT

## 2018-07-15 PROCEDURE — 63600175 PHARM REV CODE 636 W HCPCS: Performed by: EMERGENCY MEDICINE

## 2018-07-15 PROCEDURE — 93010 ELECTROCARDIOGRAM REPORT: CPT | Mod: ,,, | Performed by: INTERNAL MEDICINE

## 2018-07-15 PROCEDURE — 99284 EMERGENCY DEPT VISIT MOD MDM: CPT | Mod: 25

## 2018-07-15 PROCEDURE — 93005 ELECTROCARDIOGRAM TRACING: CPT

## 2018-07-15 PROCEDURE — 36415 COLL VENOUS BLD VENIPUNCTURE: CPT

## 2018-07-15 PROCEDURE — 85025 COMPLETE CBC W/AUTO DIFF WBC: CPT

## 2018-07-15 RX ORDER — CEPHALEXIN 500 MG/1
500 CAPSULE ORAL 4 TIMES DAILY
Qty: 20 CAPSULE | Refills: 0 | Status: SHIPPED | OUTPATIENT
Start: 2018-07-15 | End: 2018-07-20

## 2018-07-15 RX ORDER — CEFTRIAXONE 1 G/50ML
1 INJECTION, SOLUTION INTRAVENOUS
Status: COMPLETED | OUTPATIENT
Start: 2018-07-15 | End: 2018-07-15

## 2018-07-15 RX ADMIN — CEFTRIAXONE 1 G: 1 INJECTION, SOLUTION INTRAVENOUS at 09:07

## 2018-07-15 RX ADMIN — SODIUM CHLORIDE, PRESERVATIVE FREE 1000 ML: 5 INJECTION INTRAVENOUS at 09:07

## 2018-07-15 NOTE — ED PROVIDER NOTES
"Encounter Date: 7/15/2018    SCRIBE #1 NOTE: IPuja, am scribing for, and in the presence of, Dr. Jara .       History     Chief Complaint   Patient presents with    Weakness    Hypotension       07/15/2018 6:10 PM     Chief complaint: Dizziness       Blaire Cage is a 87 y.o. female with a hx of HTN, DM and COPD who presents to the ED with c/o dizziness. She woke up this morning and cooked. Shortly after she endorsed dizziness. Pt states she has been "spinning in the head" associated with tingling in the BLE and ringing noise in the right ear. She notes it all started at once and sx persist. She check her BP and states it was low. Her blood glucose was 160 at the time. Pt denies CP, SOB and fall. She takes iron supplements and notes chronic intermittent dark stools. She takes Aspirin and HTN medicatio Allergens include Carvedilol.       The history is provided by the patient.     Review of patient's allergies indicates:   Allergen Reactions    Carvedilol Other (See Comments)     Bradycardia and syncope    Boniva [ibandronate]     Codeine     Hydralazine analogues     Iodinated contrast- oral and iv dye Hives    Kionex [sodium polystyrene sulfonate] Diarrhea     From encounter 12/11/17 for diarrhea--not true allergy.    Lisinopril     Morphine      Past Medical History:   Diagnosis Date    Anemia due to multiple mechanisms 6/29/2018    Anemia, chronic disease 6/29/2018    Anemia, chronic renal failure, stage 3 (moderate) 6/29/2018    Anticoagulant long-term use     aspirin    COPD (chronic obstructive pulmonary disease)     COPD with acute exacerbation 1/9/2015    Diabetes mellitus type II     DVT (deep venous thrombosis) 06/09/2018    Encounter for blood transfusion     Hip arthritis 3/1/2016    Hyperlipidemia     Hypertension     Normocytic normochromic anemia 6/29/2018    PE (pulmonary thromboembolism) 06/09/2018    Pneumonia of right lower lobe due to infectious organism " "9/11/2017    Thyroid disease     hypothyroid     Past Surgical History:   Procedure Laterality Date    APPENDECTOMY      FRACTURE SURGERY      right hip     HERNIA REPAIR      groin    HYSTERECTOMY      VARICOSE VEIN SURGERY       Family History   Problem Relation Age of Onset    Hypertension Mother     Diabetes Sister     Diabetes Brother     Kidney disease Son      Social History   Substance Use Topics    Smoking status: Former Smoker     Packs/day: 0.35     Years: 44.00     Types: Cigarettes     Quit date: 4/30/2015    Smokeless tobacco: Never Used      Comment: 1/08/2015    Alcohol use No     Review of Systems   Constitutional: Negative for fever.   HENT: Negative for sore throat.         + for "ear ringing"   Eyes: Negative for redness.   Respiratory: Negative for shortness of breath.    Cardiovascular: Negative for chest pain.   Gastrointestinal: Negative for nausea.        + for "dark stools (intermittently chronic) "   Genitourinary: Negative for dysuria.   Musculoskeletal: Negative for back pain.   Skin: Negative for rash.   Neurological: Positive for dizziness. Negative for weakness.   Hematological: Does not bruise/bleed easily.       Physical Exam     Initial Vitals [07/15/18 1717]   BP Pulse Resp Temp SpO2   (!) 120/59 (!) 50 20 98 °F (36.7 °C) 96 %      MAP       --         Physical Exam    Constitutional: She appears well-developed and well-nourished.  Non-toxic appearance. No distress.   Sx have resolved.    HENT:   Head: Normocephalic and atraumatic.   Eyes: EOM are normal. Pupils are equal, round, and reactive to light. Right eye exhibits no nystagmus. Left eye exhibits no nystagmus.   Neck: Normal range of motion. Neck supple. No neck rigidity. No JVD present.   Cardiovascular: Regular rhythm, normal heart sounds and intact distal pulses. Bradycardia present.  Exam reveals no gallop and no friction rub.    No murmur heard.  Pulmonary/Chest: Breath sounds normal. She has no wheezes. " She has no rhonchi. She has no rales.   Abdominal: Soft. Bowel sounds are normal. She exhibits no distension. There is no tenderness. There is no rigidity, no rebound and no guarding.   Musculoskeletal: Normal range of motion.   Neurological: She is alert and oriented to person, place, and time. She has normal strength and normal reflexes. No cranial nerve deficit or sensory deficit. She exhibits normal muscle tone. Coordination normal. GCS eye subscore is 4. GCS verbal subscore is 5. GCS motor subscore is 6.   Finger to nose intact.  Negative [place] Hallpike and Romberg sign. Cranial nerves II-XII otherwise intact. Tandem gait without assisstance.     Skin: Skin is warm and dry.   Psychiatric: She has a normal mood and affect. Her speech is normal and behavior is normal. She is not actively hallucinating.         ED Course   Procedures  Labs Reviewed   CBC W/ AUTO DIFFERENTIAL - Abnormal; Notable for the following:        Result Value    RBC 2.99 (*)     Hemoglobin 9.4 (*)     Hematocrit 28.8 (*)     MCH 31.6 (*)     MPV 8.5 (*)     All other components within normal limits   COMPREHENSIVE METABOLIC PANEL - Abnormal; Notable for the following:     Sodium 134 (*)     BUN, Bld 41 (*)     Creatinine 1.8 (*)     eGFR if  29 (*)     eGFR if non  25 (*)     All other components within normal limits   URINALYSIS - Abnormal; Notable for the following:     Protein, UA Trace (*)     Occult Blood UA 1+ (*)     Nitrite, UA Positive (*)     Leukocytes, UA 1+ (*)     All other components within normal limits   URINALYSIS MICROSCOPIC - Abnormal; Notable for the following:     RBC, UA 39 (*)     WBC, UA 12 (*)     Bacteria, UA Few (*)     Hyaline Casts, UA 22 (*)     All other components within normal limits     EKG Readings: (Independently Interpreted)   Sinus bradycardia at a rate of 56. Non specific T wave inversion in V2. Normal ST segments.        Imaging Results          CT Head Without Contrast  (In process)                  Medical Decision Making:   History:   Old Medical Records: I decided to obtain old medical records.  Initial Assessment:   Patient is a 87-year-old woman who presents for symptoms of vertigo and ear ringing from her right ear since this morning.  Denies any chest pain or shortness of breath. Checked her blood pressure this morning complained of low blood pressure.  Systolics were in 110s.  Diastolics in the 30s and 40s.  Her symptoms have improved, evaluation.  [place]ster-Hallpike and Romberg were negative. Patient was able ambulate without assistance or falling over to 1 side.  CT head was negative. Low suspicion for a cerebellar CVA given improvement in her symptoms. Offered admission for further evaluation but patient declines.  Urinalysis reveals nitrite positive urine with few bacteria, 12 white blood cells and 39 RBCs consistent with urinary tract infection.  Her anemia is at his baseline and not worsened.  Renal function is slightly decline with an elevated BUN of 41 and creatinine 1.8, GFR of 25 indicating mild acute on chronic renal insufficiency.  Patient endorses drinking large amount of water and hydrating herself appropriately.  She is given IV fluids and IV Rocephin in the emergency department.  I will discharge her home on Keflex.  She is to follow up with her primary care physician.  Return precautions discussed.            Scribe Attestation:   Scribe #1: I performed the above scribed service and the documentation accurately describes the services I performed. I attest to the accuracy of the note.    I, Marek Turner, personally performed the services described in this documentation. All medical record entries made by the scribe were at my direction and in my presence.  I have reviewed the chart and agree that the record reflects my personal performance and is accurate and complete. Ruben Jara MD.  10:26 PM 07/15/2018             Clinical Impression:     1. Urinary  tract infection without hematuria, site unspecified    2. Dizziness    3. Vertigo          Disposition:   Disposition: Discharged  Condition: Stable                        Ruben Jara MD  07/15/18 4405

## 2018-07-16 NOTE — ED NOTES
Orthostatic Vital Signs:       BP  Pulse    Lyin/72  57  Sitting   148/65  55  Standing  135/57  58    Pulse Ox:  97%    Respirations:  16

## 2018-08-02 ENCOUNTER — TELEPHONE (OUTPATIENT)
Dept: FAMILY MEDICINE | Facility: CLINIC | Age: 83
End: 2018-08-02

## 2018-08-02 ENCOUNTER — OFFICE VISIT (OUTPATIENT)
Dept: FAMILY MEDICINE | Facility: CLINIC | Age: 83
End: 2018-08-02
Payer: MEDICARE

## 2018-08-02 VITALS
BODY MASS INDEX: 25.03 KG/M2 | WEIGHT: 136 LBS | HEART RATE: 64 BPM | DIASTOLIC BLOOD PRESSURE: 66 MMHG | OXYGEN SATURATION: 98 % | RESPIRATION RATE: 16 BRPM | TEMPERATURE: 98 F | SYSTOLIC BLOOD PRESSURE: 112 MMHG | HEIGHT: 62 IN

## 2018-08-02 DIAGNOSIS — N18.30 CKD (CHRONIC KIDNEY DISEASE), STAGE III: ICD-10-CM

## 2018-08-02 DIAGNOSIS — R60.0 LOCALIZED EDEMA: Primary | ICD-10-CM

## 2018-08-02 PROCEDURE — 99999 PR PBB SHADOW E&M-EST. PATIENT-LVL V: CPT | Mod: PBBFAC,,, | Performed by: PHYSICIAN ASSISTANT

## 2018-08-02 PROCEDURE — 99213 OFFICE O/P EST LOW 20 MIN: CPT | Mod: S$GLB,,, | Performed by: PHYSICIAN ASSISTANT

## 2018-08-02 NOTE — TELEPHONE ENCOUNTER
Per Agnieszka Rodriguez LPN:  Can you please call this patient back she saw Mr. Banerjee and stated he did not do anything for her swelling but ask her to elevate her feet she has been doing that already and has had swelling now for 4 days MRN [id number] Please discuss with Mr. Banerjee the course of action for this patient any problems please let me know   her number is [phone]    Spoke to ELLIE Dill regarding patient. ELLIE Winn states patients need to f/u with Nephrology due to kidney function. Patient was informed at office visit and educated on correct elevation of her lower extremities to decrease swelling. Unable to contact patient to review understanding of directions that were discussed in the visit. LM for patient to return call regarding her concerns.

## 2018-08-02 NOTE — PROGRESS NOTES
Subjective:       Patient ID: Blaire Cage is a 88 y.o. female.    Chief Complaint: Foot Swelling    HPI   Swelling x a few wks  Pt has renal disease  Sees nephrologist outside [facility]  Recent last KFT's showed eGFR of 25  Pt on salt restriction  Review of Systems   Constitutional: Negative.  Negative for activity change, appetite change, chills, diaphoresis, fatigue, fever and unexpected weight change.   HENT: Negative.    Eyes: Negative.    Respiratory: Negative.  Negative for cough and shortness of breath.    Cardiovascular: Positive for leg swelling. Negative for chest pain.   Gastrointestinal: Negative.    Endocrine: Negative.    Genitourinary: Negative.    Musculoskeletal: Negative.    Skin: Negative.  Negative for rash.       Objective:      Physical Exam   Constitutional: She appears well-developed and well-nourished. No distress.   HENT:   Head: Normocephalic and atraumatic.   Eyes: Conjunctivae are normal. No scleral icterus.   Neck: Normal range of motion. Neck supple. No tracheal deviation present. No thyromegaly present.   Cardiovascular: Normal rate, regular rhythm, normal heart sounds and intact distal pulses.  Exam reveals no gallop and no friction rub.    No murmur heard.  Pulmonary/Chest: Effort normal and breath sounds normal. No respiratory distress. She has no wheezes. She has no rales.   Abdominal: Soft. There is no tenderness.   No CVA tenderness   Musculoskeletal: She exhibits edema. She exhibits no tenderness.   Minor 1 + lower ext edema L lower leg  Less than 1 + edema R lower leg   Lymphadenopathy:     She has no cervical adenopathy.   Skin: Skin is warm and dry. No rash noted.   Vitals reviewed.      Assessment:       1. Localized edema    2. CKD (chronic kidney disease), stage III, eGFR 39, progressive 31        Plan:       Localized edema    discussed proper leg elevation  Continue salt restriction  Pt wants diuretic but I feel that is a bad option at this time because of the renal  function numbers  For now better leg elevation  Pt given recent lab results to discuss with her nephrologist and see if she will allow a diuretic

## 2018-08-03 ENCOUNTER — TELEPHONE (OUTPATIENT)
Dept: FAMILY MEDICINE | Facility: CLINIC | Age: 83
End: 2018-08-03

## 2018-08-03 NOTE — TELEPHONE ENCOUNTER
----- Message from Venancio Martinez sent at 8/3/2018 11:31 AM CDT -----  Contact: pt  Pt is requesting something be called in to assist in relieving the swelling in her feet.  Please call pt to assist.    Call Back #: [phone]  Thanks

## 2018-08-07 ENCOUNTER — OFFICE VISIT (OUTPATIENT)
Dept: HEMATOLOGY/ONCOLOGY | Facility: CLINIC | Age: 83
End: 2018-08-07
Payer: MEDICARE

## 2018-08-07 VITALS
HEART RATE: 67 BPM | DIASTOLIC BLOOD PRESSURE: 56 MMHG | SYSTOLIC BLOOD PRESSURE: 132 MMHG | RESPIRATION RATE: 18 BRPM | TEMPERATURE: 99 F | WEIGHT: 137 LBS | BODY MASS INDEX: 25.06 KG/M2

## 2018-08-07 DIAGNOSIS — D64.89 ANEMIA DUE TO MULTIPLE MECHANISMS: ICD-10-CM

## 2018-08-07 DIAGNOSIS — I26.99 OTHER ACUTE PULMONARY EMBOLISM WITHOUT ACUTE COR PULMONALE: Primary | ICD-10-CM

## 2018-08-07 DIAGNOSIS — D64.9 ANEMIA, UNSPECIFIED TYPE: ICD-10-CM

## 2018-08-07 DIAGNOSIS — N18.30 ANEMIA, CHRONIC RENAL FAILURE, STAGE 3 (MODERATE): ICD-10-CM

## 2018-08-07 DIAGNOSIS — D50.0 IRON DEFICIENCY ANEMIA DUE TO CHRONIC BLOOD LOSS: ICD-10-CM

## 2018-08-07 DIAGNOSIS — D64.9 NORMOCYTIC NORMOCHROMIC ANEMIA: ICD-10-CM

## 2018-08-07 DIAGNOSIS — D63.1 ANEMIA, CHRONIC RENAL FAILURE, STAGE 3 (MODERATE): ICD-10-CM

## 2018-08-07 DIAGNOSIS — I82.4Y2 DEEP VEIN THROMBOSIS (DVT) OF PROXIMAL VEIN OF LEFT LOWER EXTREMITY, UNSPECIFIED CHRONICITY: ICD-10-CM

## 2018-08-07 DIAGNOSIS — Z15.89 HETEROZYGOUS MTHFR MUTATION C677T: ICD-10-CM

## 2018-08-07 DIAGNOSIS — I26.99 PE (PULMONARY THROMBOEMBOLISM): ICD-10-CM

## 2018-08-07 DIAGNOSIS — Z79.01 CHRONIC ANTICOAGULATION: ICD-10-CM

## 2018-08-07 DIAGNOSIS — R79.83 HOMOCYSTEINEMIA: ICD-10-CM

## 2018-08-07 DIAGNOSIS — D63.8 ANEMIA, CHRONIC DISEASE: ICD-10-CM

## 2018-08-07 PROCEDURE — 99215 OFFICE O/P EST HI 40 MIN: CPT | Mod: ,,, | Performed by: INTERNAL MEDICINE

## 2018-08-07 NOTE — PROGRESS NOTES
[facility] Hematology/Oncology  PROGRESS NOTE - 2nd Follow-up Visit      Subjective:       Patient ID:   NAME: Blaire Cage : 1930     88 y.o. female    Referring Doc: Sandro  Other Physicians: Cande Garg (PA); Eli Edmonds (PCP); Jeffrey Christopher (PULm); Sandra (GI)    Chief Complaint:  DVT and PE f/u    History of Present Illness:     Patient returns today for a 2nd regularly scheduled follow-up visit.  The patient is here today to go over the results of the recently ordered labs, tests and studies. She is here with her daughter. I went over the results of the clotting and anemia studies. She has MTHFR-C heterozygous condition with elevated homocysteine. She denies any CP, SOB, HA's or N/V.             ROS:   GEN: normal without any fever, night sweats or weight loss  HEENT: normal with no HA's, sore throat, stiff neck, changes in vision  CV: normal with no CP, SOB, PND, WEST or orthopnea  PULM: normal with no SOB, cough, hemoptysis, sputum or pleuritic pain  GI: normal with no abdominal pain, nausea, vomiting, constipation, diarrhea, melanotic stools, BRBPR, or hematemesis  : normal with no hematuria, dysuria  BREAST: normal with no mass, discharge, pain  SKIN: normal with no rash, erythema, bruising, or swelling    Allergies:  Review of patient's allergies indicates:   Allergen Reactions    Carvedilol Other (See Comments)     Bradycardia and syncope    Boniva [ibandronate]     Codeine     Hydralazine analogues     Iodinated contrast- oral and iv dye Hives    Kionex [sodium polystyrene sulfonate] Diarrhea     From encounter 17 for diarrhea--not true allergy.    Lisinopril     Morphine        Medications:    Current Outpatient Prescriptions:     albuterol-ipratropium 2.5mg-0.5mg/3mL (DUO-NEB) 0.5 mg-3 mg(2.5 mg base)/3 mL nebulizer solution, Take 3 mLs by nebulization every 6 (six) hours while awake. Rescue, Disp: 180 mL, Rfl: prn    amlodipine (NORVASC) 10 MG tablet, Take 1 tablet (10 mg  total) by mouth once daily., Disp: 90 tablet, Rfl: 3    apixaban 5 mg Tab, Take 1 tablet (5 mg total) by mouth 2 (two) times daily., Disp: 60 tablet, Rfl: 3    ascorbic acid (VITAMIN C) 500 MG tablet, Take 500 mg by mouth once daily.  , Disp: , Rfl:     aspirin (ECOTRIN) 81 MG EC tablet, Take 81 mg by mouth once daily.  , Disp: , Rfl:     calcium carbonate (OS-TASIA) 500 mg calcium (1,250 mg) tablet, Take 1 tablet by mouth 2 (two) times daily.  , Disp: , Rfl:     cloNIDine (CATAPRES) 0.1 MG tablet, Take 1 tablet (0.1 mg total) by mouth 2 (two) times daily. Take one tablet by mouth every hour as needed for systolic blood pressure greater than 180.  Do not take more than 3 tablets in 24 hours., Disp: 90 tablet, Rfl: 3    ferrous sulfate 325 mg (65 mg iron) Tab tablet, Take 1 tablet (325 mg total) by mouth 2 (two) times daily., Disp: 180 tablet, Rfl: 3    fish oil-omega-3 fatty acids 300-1,000 mg capsule, Take 2 g by mouth every evening. , Disp: , Rfl:     fluticasone (FLONASE) 50 mcg/actuation nasal spray, 2 sprays by Each Nare route once daily., Disp: 48 g, Rfl: 3    gabapentin (NEURONTIN) 300 MG capsule, Take 1 capsule (300 mg total) by mouth every evening., Disp: 90 capsule, Rfl: 3    guanFACINE (TENEX) 1 MG Tab, TAKE 1 TABLET BY MOUTH IN THE EVENING, Disp: 90 tablet, Rfl: 3    levothyroxine (SYNTHROID) 100 MCG tablet, Take 1 tablet (100 mcg total) by mouth before breakfast., Disp: 90 tablet, Rfl: 3    losartan (COZAAR) 100 MG tablet, Take 1 tablet (100 mg total) by mouth once daily., Disp: 90 tablet, Rfl: 3    magnesium oxide (MAG-OX) 400 mg tablet, TAKE ONE TABLET BY MOUTH ONCE DAILY, Disp: 90 tablet, Rfl: 3    montelukast (SINGULAIR) 10 mg tablet, Take 1 tablet (10 mg total) by mouth every evening., Disp: 90 tablet, Rfl: 3    nitroGLYCERIN (NITROSTAT) 0.4 MG SL tablet, Place 1 tablet (0.4 mg total) under the tongue every 5 (five) minutes as needed for Chest pain. As needed (Patient taking  differently: Place 0.4 mg under the tongue every 5 (five) minutes as needed for Chest pain. Seek medical help if pain is not relieved after the third dose.), Disp: 30 tablet, Rfl: 3    predniSONE (DELTASONE) 20 MG tablet, Take one daily  For 3 and repeat for bronchitis., Disp: 18 tablet, Rfl: 0    sertraline (ZOLOFT) 25 MG tablet, Take 1 tablet (25 mg total) by mouth once daily., Disp: 30 tablet, Rfl: 0    simvastatin (ZOCOR) 40 MG tablet, TAKE ONE TABLET BY MOUTH ONCE DAILY IN THE EVENING, Disp: 90 tablet, Rfl: 0    vitamin D 1000 units Tab, Take 1 tablet (1,000 Units total) by mouth once daily., Disp: 90 tablet, Rfl: 3    PMHx/PSHx Updates:  See patient's last visit with me on 6/29/2018.  See H&P on 6/29/2018        Pathology:  Cancer Staging  No matching staging information was found for the patient.          Objective:     Vitals:  Blood pressure (!) 132/56, pulse 67, temperature 98.5 °F (36.9 °C), temperature source Oral, resp. rate 18, weight 62.1 kg (137 lb).    Physical Examination:   GEN: no apparent distress, comfortable; AAOx3  HEAD: atraumatic and normocephalic  EYES: no pallor, no icterus, PERRLA  ENT: OMM, no pharyngeal erythema, external ears WNL; no nasal discharge; no thrush  NECK: no masses, thyroid normal, trachea midline, no LAD/LN's, supple  CV: RRR with no murmur; normal pulse; normal S1 and S2; no pedal edema  CHEST: Normal respiratory effort; CTAB; normal breath sounds; no wheeze or crackles  ABDOM: nontender and nondistended; soft; normal bowel sounds; no rebound/guarding  MUSC/Skeletal: ROM normal; no crepitus; joints normal; no deformities or arthropathy; uses walker  EXTREM: no clubbing, cyanosis, inflammation or swelling  SKIN: no rashes, lesions, ulcers, petechiae or subcutaneous nodules; vitiligo   : no carter  NEURO: grossly intact; motor/sensory WNL; AAOx3; no tremors  PSYCH: normal mood, affect and behavior  LYMPH: normal cervical, supraclavicular, axillary and groin  LN's             Labs:   7/15/2018  Lab Results   Component Value Date    WBC 8.00 07/15/2018    HGB 9.4 (L) 07/15/2018    HCT 28.8 (L) 07/15/2018    MCV 96 07/15/2018     07/15/2018     BMP  Lab Results   Component Value Date     (L) 07/15/2018    K 4.8 07/15/2018     07/15/2018    CO2 23 07/15/2018    BUN 41 (H) 07/15/2018    CREATININE 1.8 (H) 07/15/2018    CALCIUM 9.3 07/15/2018    ANIONGAP 8 07/15/2018    ESTGFRAFRICA 29 (A) 07/15/2018    EGFRNONAA 25 (A) 07/15/2018     Lab Results   Component Value Date    ALT 23 07/15/2018    AST 24 07/15/2018    ALKPHOS 67 07/15/2018    BILITOT 0.3 07/15/2018       Lab Results   Component Value Date    IRON 87 07/06/2018    TIBC 273 07/06/2018    FERRITIN 176 07/06/2018       Immunofixation electrophoresis   Order: [id number]   Status:  Final result   Visible to patient:  Yes (Patient Portal) Next appt:  09/26/2018 at 02:40 PM in Family Medicine (Eli Edmonds MD) Dx:  Anemia, unspecified type; Normocytic ...    1mo ago   Interpretation    Comment:     Normal pattern. No monoclonal proteins detected.                Immunoglobulin free LT chains blood   Order: [id number]   Status:  Final result   Visible to patient:  Yes (Patient Portal) Next appt:  09/26/2018 at 02:40 PM in Family Medicine (Eli Edmonds MD) Dx:  Anemia, unspecified type; Normocytic ...    Ref Range & Units 1mo ago   Kappa Light Chain, Free, Serum 3.3 - 19.4 mg/L 34.1     Lambda Light Chain, Free, Serum 5.7 - 26.3 mg/L 22.3    Kappa/Lambda Light Chains Free with Ratio, Serum 0.26 - 1.65 1.53                  Radiology/Diagnostic Studies:    Ct Head Without Contrast    Result Date: 7/16/2018  EXAMINATION: CT HEAD WITHOUT CONTRAST CLINICAL HISTORY: Dizziness; TECHNIQUE: 5 mm noncontrast axial images were acquired through the head. COMPARISON: CT head without contrast-11/29/2014 FINDINGS: The brain is normally formed with preserved gray-white matter junction differentiation. No evidence of  acute/recent major vascular territory cerebral infarction, parenchymal hemorrhage, or intra-axial mass.  There is age-appropriate cerebral volume loss with associated compensatory enlargement of the ventricular system and widening of the CSF spaces over both cerebral convexities.  There are confluent areas of periventricular white matter hypoattenuation compatible with age-appropriate chronic microvascular ischemic changes. No hydrocephalus.  No effacement of the skull-base cisterns.  No extra-axial fluid collections or blood products. The paranasal sinuses and mastoid air cells are clear.  There is rightward bony nasal septal deviation.  The visualized orbits are unremarkable.  The bony calvarium and visualized facial bones show no acute abnormality.     No acute intracranial abnormality appreciated.  No interval detrimental change in the imaging appearance of the brain when compared to the previous study. Electronically signed by: Aubrey Davis MD Date:    07/16/2018 Time:    08:01    Radiology Report    Result Date: 7/15/2018  Ordered by an unspecified provider.      I have reviewed all available lab results and radiology reports.    Assessment/Plan:   (1) 88 y.o. female with diagnosis of a recent LLE DVT and Pulmonary emboli who has been referred by Dr Jo with Neph for evaluation by medical hematology/oncology. She was hospitalized [facility]. She has never had any clots before. No family hx/of clots.    - factor panel, protein C and S, ATIII adequate  - antiphosphatidyl negative; LA negative  - homocysteine elevated at 20.2  - MTHFR-C heterozygous positive - discussed the genetic implications with her and her daughter  - prothrombin II negative  - she is on eliquis bid      (2) COPD/asthma; former smoker     (3) Left ventricular dysfunction     (4) HTN and hypercholesterolemia     (5) CRI - stage III     (6) Anemia - most likely a multifactorial process with iron deficiency diagnosis, anemia of chronic  disorders, anemia of chronic renal  - hgb is at 9.4 and better  - ferritin was 176  - B12 413  - she is on iron and MVI    (7) DM and hypothyroidism            Other acute pulmonary embolism without acute cor pulmonale    Deep vein thrombosis (DVT) of proximal vein of left lower extremity, unspecified chronicity    PE (pulmonary thromboembolism)    Chronic anticoagulation    Normocytic normochromic anemia    Iron deficiency anemia due to chronic blood loss    Anemia, chronic renal failure, stage 3 (moderate)    Anemia, chronic disease    Anemia due to multiple mechanisms    Anemia, unspecified type    Homocysteinemia    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          PLAN:  1. Add Folbic for the hyperhomocysteinemia  2. Continue eliquis and consider lifelong usage  3. Discussed the genetic implications of the MTHFR-C in siblings and children  4 .f/u with PCp, etc  RTC in 3 months  Fax note to Eli Edmonds MD; Guerline Jo    Discussion:       I spent over 25 mins of time with the patient. Reviewed results of the recently ordered labs, tests and studies; made directives with regards to the results. Over half of this time was spent couseling and coordinating care.    I have explained all of the above in detail and the patient understands all of the current recommendation(s). I have answered all of their questions to the best of my ability and to their complete satisfaction.   The patient is to continue with the current management plan.            Electronically signed by Issac Alvarez MD

## 2018-08-10 ENCOUNTER — TELEPHONE (OUTPATIENT)
Dept: FAMILY MEDICINE | Facility: CLINIC | Age: 83
End: 2018-08-10
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
## 2018-08-10 NOTE — TELEPHONE ENCOUNTER
----- Message from Heike Flores sent at 8/10/2018 12:11 PM CDT -----  Contact: patient  Requesting to be seen today for swollen feet. Please call [phone]

## 2018-08-20 ENCOUNTER — HOSPITAL ENCOUNTER (EMERGENCY)
Facility: HOSPITAL | Age: 83
Discharge: HOME | End: 2018-08-20
Attending: EMERGENCY MEDICINE
Payer: MEDICARE

## 2018-08-20 VITALS
TEMPERATURE: 98 F | SYSTOLIC BLOOD PRESSURE: 152 MMHG | DIASTOLIC BLOOD PRESSURE: 78 MMHG | WEIGHT: 134 LBS | HEART RATE: 66 BPM | HEIGHT: 63 IN | BODY MASS INDEX: 23.74 KG/M2 | OXYGEN SATURATION: 99 % | RESPIRATION RATE: 18 BRPM

## 2018-08-20 DIAGNOSIS — N39.0 URINARY TRACT INFECTION WITHOUT HEMATURIA, SITE UNSPECIFIED: ICD-10-CM

## 2018-08-20 DIAGNOSIS — R07.9 CHEST PAIN: ICD-10-CM

## 2018-08-20 DIAGNOSIS — R53.83 FATIGUE, UNSPECIFIED TYPE: Primary | ICD-10-CM

## 2018-08-20 LAB
ALBUMIN SERPL BCP-MCNC: 3.5 G/DL
ALP SERPL-CCNC: 89 U/L
ALT SERPL W/O P-5'-P-CCNC: 34 U/L
ANION GAP SERPL CALC-SCNC: 10 MMOL/L
AST SERPL-CCNC: 30 U/L
BACTERIA #/AREA URNS HPF: NORMAL /HPF
BASOPHILS # BLD AUTO: 0 K/UL
BASOPHILS NFR BLD: 0.3 %
BILIRUB SERPL-MCNC: 0.3 MG/DL
BILIRUB UR QL STRIP: NEGATIVE
BUN SERPL-MCNC: 36 MG/DL
CALCIUM SERPL-MCNC: 9.1 MG/DL
CHLORIDE SERPL-SCNC: 93 MMOL/L
CLARITY UR: CLEAR
CO2 SERPL-SCNC: 26 MMOL/L
COLOR UR: YELLOW
CREAT SERPL-MCNC: 1.8 MG/DL
DIFFERENTIAL METHOD: ABNORMAL
EOSINOPHIL # BLD AUTO: 0.2 K/UL
EOSINOPHIL NFR BLD: 2 %
ERYTHROCYTE [DISTWIDTH] IN BLOOD BY AUTOMATED COUNT: 13.4 %
EST. GFR  (AFRICAN AMERICAN): 29 ML/MIN/1.73 M^2
EST. GFR  (NON AFRICAN AMERICAN): 25 ML/MIN/1.73 M^2
GLUCOSE SERPL-MCNC: 156 MG/DL
GLUCOSE UR QL STRIP: NEGATIVE
HCT VFR BLD AUTO: 30.3 %
HGB BLD-MCNC: 10.1 G/DL
HGB UR QL STRIP: ABNORMAL
KETONES UR QL STRIP: NEGATIVE
LEUKOCYTE ESTERASE UR QL STRIP: ABNORMAL
LYMPHOCYTES # BLD AUTO: 2.1 K/UL
LYMPHOCYTES NFR BLD: 23 %
MCH RBC QN AUTO: 32 PG
MCHC RBC AUTO-ENTMCNC: 33.5 G/DL
MCV RBC AUTO: 96 FL
MICROSCOPIC COMMENT: NORMAL
MONOCYTES # BLD AUTO: 0.9 K/UL
MONOCYTES NFR BLD: 9.8 %
NEUTROPHILS # BLD AUTO: 5.8 K/UL
NEUTROPHILS NFR BLD: 64.9 %
NITRITE UR QL STRIP: NEGATIVE
PH UR STRIP: 6 [PH] (ref 5–8)
PHOSPHATE SERPL-MCNC: 3.4 MG/DL
PLATELET # BLD AUTO: 237 K/UL
PMV BLD AUTO: 8 FL
POTASSIUM SERPL-SCNC: 4.3 MMOL/L
PROT SERPL-MCNC: 6.4 G/DL
PROT UR QL STRIP: ABNORMAL
RBC # BLD AUTO: 3.16 M/UL
RBC #/AREA URNS HPF: 1 /HPF (ref 0–4)
SODIUM SERPL-SCNC: 129 MMOL/L
SP GR UR STRIP: <=1.005 (ref 1–1.03)
SQUAMOUS #/AREA URNS HPF: 1 /HPF
TROPONIN I SERPL DL<=0.01 NG/ML-MCNC: <0.006 NG/ML
URN SPEC COLLECT METH UR: ABNORMAL
UROBILINOGEN UR STRIP-ACNC: NEGATIVE EU/DL
WBC # BLD AUTO: 9 K/UL
WBC #/AREA URNS HPF: 5 /HPF (ref 0–5)

## 2018-08-20 PROCEDURE — 81000 URINALYSIS NONAUTO W/SCOPE: CPT

## 2018-08-20 PROCEDURE — 99284 EMERGENCY DEPT VISIT MOD MDM: CPT | Mod: 25

## 2018-08-20 PROCEDURE — 85025 COMPLETE CBC W/AUTO DIFF WBC: CPT

## 2018-08-20 PROCEDURE — 93010 ELECTROCARDIOGRAM REPORT: CPT | Mod: ,,, | Performed by: INTERNAL MEDICINE

## 2018-08-20 PROCEDURE — 36415 COLL VENOUS BLD VENIPUNCTURE: CPT

## 2018-08-20 PROCEDURE — 80053 COMPREHEN METABOLIC PANEL: CPT

## 2018-08-20 PROCEDURE — 25000003 PHARM REV CODE 250: Performed by: EMERGENCY MEDICINE

## 2018-08-20 PROCEDURE — 84484 ASSAY OF TROPONIN QUANT: CPT

## 2018-08-20 PROCEDURE — 93005 ELECTROCARDIOGRAM TRACING: CPT

## 2018-08-20 PROCEDURE — 84100 ASSAY OF PHOSPHORUS: CPT

## 2018-08-20 PROCEDURE — 87086 URINE CULTURE/COLONY COUNT: CPT

## 2018-08-20 RX ORDER — TRAMADOL HYDROCHLORIDE 50 MG/1
50 TABLET ORAL EVERY 6 HOURS PRN
COMMUNITY
End: 2018-10-02 | Stop reason: CLARIF

## 2018-08-20 RX ORDER — AMOXICILLIN AND CLAVULANATE POTASSIUM 875; 125 MG/1; MG/1
1 TABLET, FILM COATED ORAL
Status: COMPLETED | OUTPATIENT
Start: 2018-08-20 | End: 2018-08-20

## 2018-08-20 RX ORDER — AMOXICILLIN AND CLAVULANATE POTASSIUM 875; 125 MG/1; MG/1
1 TABLET, FILM COATED ORAL 2 TIMES DAILY
Qty: 14 TABLET | Refills: 0 | Status: SHIPPED | OUTPATIENT
Start: 2018-08-20 | End: 2018-08-29 | Stop reason: ALTCHOICE

## 2018-08-20 RX ORDER — FUROSEMIDE 40 MG/1
40 TABLET ORAL 2 TIMES DAILY
COMMUNITY
End: 2018-10-01 | Stop reason: SDUPTHER

## 2018-08-20 RX ORDER — AMOXICILLIN AND CLAVULANATE POTASSIUM 875; 125 MG/1; MG/1
TABLET, FILM COATED ORAL
Status: DISCONTINUED
Start: 2018-08-20 | End: 2018-08-21 | Stop reason: HOSPADM

## 2018-08-20 RX ADMIN — AMOXICILLIN AND CLAVULANATE POTASSIUM 1 TABLET: 875; 125 TABLET, FILM COATED ORAL at 11:08

## 2018-08-21 ENCOUNTER — TELEPHONE (OUTPATIENT)
Dept: FAMILY MEDICINE | Facility: CLINIC | Age: 83
End: 2018-08-21

## 2018-08-21 NOTE — TELEPHONE ENCOUNTER
----- Message from Tayo Guadalupe MD sent at 8/21/2018 10:03 AM CDT -----  Regarding: ER visit  Eli,    Saw this patient of yours in the emergency room for fatigue, sodium is down little possibly UTI.  Might want to try and get her in to clinic later this week for  recheck labs.    Thank you,  Sotero

## 2018-08-21 NOTE — TELEPHONE ENCOUNTER
----- Message from Asia Hayes sent at 8/21/2018  2:37 PM CDT -----    Type:  Patient Returning Call    Who Called:  Pt Who Left Message for Patient: nurse Does the patient know what this is regarding?:  Toya Call Back Number:  [phone] Information:   Placed a  Call to  pod

## 2018-08-21 NOTE — ED NOTES
Presents to the ER with c/o dysuria and increased belching. Associated complaints are fatigue, decreased appetite, nausea and  belching. AAOx4. Patient moves all extremities without difficulty. Mucous membranes are pink and moist. Skin is warm, dry and intact. Lungs are clear bilaterally, respirations are regular and unlabored. Denies cough, congestion, rhinorrhea or SOB. BS active x4, no tenderness with palpation, abd is soft and not distended. Denies any appetite or activity change. S1S2, capillary refill is < 2 seconds. Denies difficulty urinating, frequency, numbness, tingling or weakness. KELLIE LAYTONS

## 2018-08-21 NOTE — TELEPHONE ENCOUNTER
Called patient to scheduled appointment, no answer left detail voice message for patient to call back to scheduled follow up appointment.

## 2018-08-21 NOTE — TELEPHONE ENCOUNTER
Returned call to Ms. Rudd in regards to message left. No answer, left message to return call. Looking in the chart it looks like the message was sent to the wrong MD.

## 2018-08-21 NOTE — ED NOTES
Upon discharge, patient is AAOx4, no cardiac or respiratory complications. Follow up care and  Medications have been reviewed with patient and has been instructed to return to the ER if needed. Patient verbalized understanding and ambulated to the lobby without difficulty. RENÉ HOPKINS.       supplemental O2

## 2018-08-21 NOTE — ED PROVIDER NOTES
"Encounter Date: 8/20/2018    SCRIBE #1 NOTE: I, Debbie Glynn, am scribing for, and in the presence of,  Dr. Guadalupe . I have scribed the entire note.       History     Chief Complaint   Patient presents with    Dysuria     onset today    Heartburn     c/o frequent belching and discomfort       08/20/2018  9:40 PM       The patient is a 88 y.o. female who is presenting with the gradual onset of fatigue that began today upon waking. The pt reports that she feels "tried" and endorses associated decrease in appetite, intermittent nausea, painful urination, and frequent belching. No mitigating or exacerbating factors. The pt denies associated CP, SOB, leg swelling, hematuria, or episodes of vomiting. No other comments or complaints. Of note, the pt's daughter states that the pt presented with similar sxs with previous UTIs. 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 Pertinent past surgical hx includes hysterectomy; Appendectomy.           The history is provided by the patient, medical records and a relative.     Review of patient's allergies indicates:   Allergen Reactions    Carvedilol Other (See Comments)     Bradycardia and syncope    Boniva [ibandronate]     Codeine     Hydralazine analogues     Iodinated contrast- oral and iv dye Hives    Kionex [sodium polystyrene sulfonate] Diarrhea     From encounter 12/11/17 for diarrhea--not true allergy.   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 8/7/2018    Hip arthritis 3/1/2016    Homocysteinemia 8/7/2018    Hyperlipidemia     Hypertension     Normocytic normochromic anemia 6/29/2018    PE (pulmonary thromboembolism) 06/09/2018    Pneumonia of right lower lobe due to infectious organism 9/11/2017    Thyroid disease     hypothyroid     Past Surgical History:   Procedure Laterality Date    APPENDECTOMY      FRACTURE SURGERY      right hip     HERNIA REPAIR      groin    HYSTERECTOMY      VARICOSE VEIN SURGERY       Family History   Problem Relation Age of Onset    Hypertension Mother     Diabetes Sister     Diabetes Brother     Kidney disease Son      Social History     Tobacco Use    Smoking status: Former Smoker     Packs/day: 0.35     Years: 44.00     Pack years: 15.40     Types: Cigarettes     Last attempt to quit: 4/30/2015     Years since quitting: 3.3    Smokeless tobacco: Never Used    Tobacco comment: 1/08/2015   Substance Use Topics    Alcohol use: No     Alcohol/week: 0.0 oz    Drug use: No     Review of Systems   Constitutional: Positive for appetite change (decreased ) and fatigue.   Gastrointestinal: Positive for nausea.        +belching, denies heartburn or abdominal pain to physician   Genitourinary: Positive for dysuria.   All other systems reviewed and are negative.      Physical Exam     Initial Vitals [08/20/18 2059]   BP Pulse Resp Temp SpO2   (!) 157/68 62 18 97.9 °F (36.6 °C) 97 %      MAP       --         Physical Exam    Nursing note and  vitals reviewed.  Constitutional: She appears well-developed and well-nourished. She is not diaphoretic. No distress.   HENT:   Head: Normocephalic and atraumatic.   Eyes: EOM are normal.   Neck: Normal range of motion. Neck supple.   Cardiovascular: Normal rate, regular rhythm and normal heart sounds. Exam reveals no gallop and no friction rub.    No murmur heard.  Pulmonary/Chest: Breath sounds normal. No respiratory distress. She has no wheezes. She has no rhonchi. She has no rales.   Abdominal: Soft. She exhibits no distension. There is no tenderness. There is no rebound.   No abdominal tenderness   Musculoskeletal: Normal range of motion.   Neurological: She is alert and oriented to person, place, and time.   Skin: Skin is warm and dry.   Psychiatric: She has a normal mood and affect. Her behavior is normal. Judgment and thought content normal.         ED Course   Procedures  Labs Reviewed   CBC W/ AUTO DIFFERENTIAL - Abnormal; Notable for the following components:       Result Value    RBC 3.16 (*)     Hemoglobin 10.1 (*)     Hematocrit 30.3 (*)     MCH 32.0 (*)     MPV 8.0 (*)     All other components within normal limits   COMPREHENSIVE METABOLIC PANEL   URINALYSIS, REFLEX TO URINE CULTURE   TROPONIN I   PHOSPHORUS     EKG Readings: (Independently Interpreted)   Heart Rate: 61.   Normal sinus rhythm. No ST elevation or depression. Normal EKG.        Imaging Results          X-Ray Chest AP Portable (In process)                  Medical Decision Making:   History:   Old Medical Records: I decided to obtain old medical records.  Clinical Tests:   Lab Tests: Ordered and Reviewed  Radiological Study: Ordered and Reviewed  Medical Tests: Ordered and Reviewed            Scribe Attestation:   Scribe #1: I performed the above scribed service and the documentation accurately describes the services I performed. I attest to the accuracy of the note.            ED Course as of Aug 21 1001   Mon Aug 20, 2018   2111 SpO2:  97 % [EF]   2111 Resp: 18 [EF]   2111 Pulse: 62 [EF]   2111 Temp src: Oral [EF]   2111 Temp: 97.9 °F (36.6 °C) [EF]   2111 BP: (!) 157/68 [EF]   2206 Sodium: (!) 129 [EF]   2206 Glucose: (!) 156 [EF]   2206 BUN, Bld: (!) 36 [EF]   2206 Creatinine: (!) 1.8 [EF]   2211 Troponin I: <0.006 [EF]   2253 Leukocytes, UA: (!) 2+ [EF]   2253 Squam Epithel, UA: 1 [EF]   2253 Bacteria, UA: Rare [EF]   2253 WBC, UA: 5 [EF]   2253 Occult Blood UA: (!) 1+ [EF]   2253 Protein, UA: (!) Trace [EF]   2253 Specific Gravity, UA: (!) <=1.005 [EF]   2305 88-year-old presents to the emergency room with a chief complaint of fatigue.  Urinalysis appears consistent with the UTI.  Sodium slightly down, see primary care later this week for this.  Continue p.o. fluids.  Return to the ER symptoms worsen.  No indication for admission my opinion.  There is not appear to be any acute life-threatening illness.  P.o. antibiotics, return if symptoms worsen or change.  [EF]      ED Course User Index  [EF] Tayo Guadalupe MD     Clinical Impression:   The primary encounter diagnosis was Fatigue, unspecified type. Diagnoses of Chest pain and Urinary tract infection without hematuria, site unspecified were also pertinent to this visit.              I, Dr. Guadalupe, personally performed the services described in this documentation. All medical record entries made by the scribe were at my direction and in my presence.  I have reviewed the chart and agree that the record reflects my personal performance and is accurate and complete.10:02 AM 08/21/2018                   Tayo Guadalupe MD  08/21/18 4220

## 2018-08-21 NOTE — TELEPHONE ENCOUNTER
----- Message from Jose Rizo sent at 8/21/2018 11:32 AM CDT -----  Contact: Pt  Name of Who is Calling: Blaire      What is the request in detail: Patient is calling to schedule a 3 day follow up. Patient states she thinks 09/04 is too late      Can the clinic reply by [facility]: no      What Number to Call Back if not in [facility]: .[phone] (home)

## 2018-08-22 LAB — BACTERIA UR CULT: NORMAL

## 2018-08-22 RX ORDER — LOSARTAN POTASSIUM 100 MG/1
TABLET ORAL
Qty: 90 TABLET | Refills: 3 | Status: ON HOLD | OUTPATIENT
Start: 2018-08-22 | End: 2018-09-15 | Stop reason: HOSPADM

## 2018-08-25 DIAGNOSIS — J43.8 OTHER EMPHYSEMA: ICD-10-CM

## 2018-08-26 RX ORDER — IPRATROPIUM BROMIDE AND ALBUTEROL SULFATE 2.5; .5 MG/3ML; MG/3ML
SOLUTION RESPIRATORY (INHALATION)
Qty: 120 VIAL | Refills: 5 | Status: SHIPPED | OUTPATIENT
Start: 2018-08-26 | End: 2019-10-09 | Stop reason: SDUPTHER

## 2018-08-29 ENCOUNTER — HOSPITAL ENCOUNTER (OUTPATIENT)
Dept: RADIOLOGY | Facility: HOSPITAL | Age: 83
Discharge: HOME | End: 2018-08-29
Attending: PODIATRIST
Payer: MEDICARE

## 2018-08-29 ENCOUNTER — OFFICE VISIT (OUTPATIENT)
Dept: PODIATRY | Facility: CLINIC | Age: 83
End: 2018-08-29
Payer: MEDICARE

## 2018-08-29 VITALS — HEIGHT: 63 IN | WEIGHT: 134.13 LBS | BODY MASS INDEX: 23.77 KG/M2

## 2018-08-29 DIAGNOSIS — M21.6X1 ACQUIRED EQUINUS DEFORMITY OF BOTH FEET: ICD-10-CM

## 2018-08-29 DIAGNOSIS — M10.9 ACUTE GOUT OF LEFT ANKLE, UNSPECIFIED CAUSE: ICD-10-CM

## 2018-08-29 DIAGNOSIS — B35.1 ONYCHOMYCOSIS DUE TO DERMATOPHYTE: ICD-10-CM

## 2018-08-29 DIAGNOSIS — M25.572 ACUTE LEFT ANKLE PAIN: ICD-10-CM

## 2018-08-29 DIAGNOSIS — M72.2 PLANTAR FASCIITIS OF LEFT FOOT: ICD-10-CM

## 2018-08-29 DIAGNOSIS — M21.6X2 ACQUIRED EQUINUS DEFORMITY OF BOTH FEET: ICD-10-CM

## 2018-08-29 DIAGNOSIS — E11.42 DIABETIC POLYNEUROPATHY ASSOCIATED WITH TYPE 2 DIABETES MELLITUS: Primary | ICD-10-CM

## 2018-08-29 DIAGNOSIS — E11.42 DIABETIC POLYNEUROPATHY ASSOCIATED WITH TYPE 2 DIABETES MELLITUS: ICD-10-CM

## 2018-08-29 PROCEDURE — 99214 OFFICE O/P EST MOD 30 MIN: CPT | Mod: 25,S$GLB,, | Performed by: PODIATRIST

## 2018-08-29 PROCEDURE — 11721 DEBRIDE NAIL 6 OR MORE: CPT | Mod: Q9,S$GLB,, | Performed by: PODIATRIST

## 2018-08-29 PROCEDURE — 73610 X-RAY EXAM OF ANKLE: CPT | Mod: TC,PO,LT

## 2018-08-29 PROCEDURE — 73610 X-RAY EXAM OF ANKLE: CPT | Mod: 26,LT,, | Performed by: RADIOLOGY

## 2018-08-29 PROCEDURE — 73630 X-RAY EXAM OF FOOT: CPT | Mod: TC,PO,LT

## 2018-08-29 PROCEDURE — 99999 PR PBB SHADOW E&M-EST. PATIENT-LVL III: CPT | Mod: PBBFAC,,, | Performed by: PODIATRIST

## 2018-08-29 PROCEDURE — 73630 X-RAY EXAM OF FOOT: CPT | Mod: 26,LT,, | Performed by: RADIOLOGY

## 2018-08-29 NOTE — PROGRESS NOTES
Subjective:      Patient ID: Blaire Cage is a 88 y.o. female.    Chief Complaint: Foot Pain (left); Ankle Pain (left); Heel Pain (left); and Diabetes Mellitus (PCP:  Dr Edmonds 8/2/18; HgbA1c: 6/27/18  6.8)    Blaire is a 88 y.o. female who presents to the clinic for routine evaluation and treatment of diabetic feet. 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 Patient relates when she woke up yesterday and tried to walk she had 10/10 pain to the left ankle, today it is 8/10. Using a walker to assist in ambulation, denies trauma. Pain is sharp with touch or ambulation. Also in need of nail trimming.  Relates having occasional discomfort from toenails with wearing closed toe shoes.  Has not attempted to self treat.  Denies any additional pedal complaints.     PCP: Dr. Edmonds   Date Last Seen by PCP: 5/22/17    Current shoe gear: Rx diabetic shoes with heat molded insoles.     Hemoglobin A1C   Date Value Ref Range Status   06/27/2018 6.8 (H) 4.0 - 5.6 % Final     Comment:     ADA Screening Guidelines:  5.7-6.4%  Consistent with prediabetes  >or=6.5%  Consistent with diabetes  High levels of fetal hemoglobin interfere with the HbA1C  assay. Heterozygous hemoglobin variants (HbS, HgC, etc)do  not significantly interfere with this assay.   However, presence  of multiple variants may affect accuracy.     06/08/2018 6.3 (H) 4.0 - 5.6 % Final     Comment:     ADA Screening Guidelines:  5.7-6.4%  Consistent with prediabetes  >or=6.5%  Consistent with diabetes  High levels of fetal hemoglobin interfere with the HbA1C  assay. Heterozygous hemoglobin variants (HbS, HgC, etc)do  not significantly interfere with this assay.   However, presence of multiple variants may affect accuracy.     03/23/2018 6.2 (H) 4.0 - 5.6 % Final     Comment:     According to ADA guidelines, hemoglobin A1c <7.0% represents  optimal control in non-pregnant diabetic patients. Different  metrics may apply to specific patient populations.   Standards of Medical Care in Diabetes-2016.  For the purpose of screening for the presence of diabetes:  <5.7%     Consistent with the absence of diabetes  5.7-6.4%  Consistent with increasing risk for diabetes   (prediabetes)  >or=6.5%  Consistent with diabetes  Currently, no consensus exists for use of hemoglobin A1c  for diagnosis of diabetes for children.  This Hemoglobin A1c assay has significant interference with fetal   hemoglobin   (HbF). The results are invalid for patients with abnormal amounts of   HbF,   including those with known Hereditary Persistence   of Fetal Hemoglobin. Heterozygous hemoglobin variants (HbAS, HbAC,   HbAD, HbAE, HbA2) do not significantly interfere with this assay;   however, presence of multiple variants in a sample may impact the %   interference.             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  8/7/2018    Hip arthritis 3/1/2016    Homocysteinemia 8/7/2018    Hyperlipidemia     Hypertension     Normocytic normochromic anemia 6/29/2018    PE (pulmonary thromboembolism) 06/09/2018    Pneumonia of right lower lobe due to infectious organism 9/11/2017    Thyroid disease     hypothyroid       Past Surgical History:   Procedure Laterality Date    APPENDECTOMY      FRACTURE SURGERY      right hip     HERNIA REPAIR      groin    HYSTERECTOMY      VARICOSE VEIN SURGERY         Family History   Problem Relation Age of Onset    Hypertension Mother     Diabetes Sister     Diabetes Brother     Kidney disease Son        Social History     Socioeconomic History    Marital status: Legally      Spouse name: None    Number of children: None    Years of education: None    Highest education level: None   Social Needs    Financial resource strain: None    Food insecurity - worry: None    Food insecurity - inability: None    Transportation needs - medical: None    Transportation needs - non-medical: None   Occupational History    None   Tobacco Use    Smoking status: Former Smoker     Packs/day: 0.35     Years: 44.00     Pack years: 15.40     Types: Cigarettes     Last attempt to quit: 4/30/2015     Years since quitting: 3.3    Smokeless tobacco: Never Used    Tobacco comment: 1/08/2015   Substance and Sexual Activity    Alcohol use: No     Alcohol/week: 0.0 oz    Drug use: No    Sexual activity: No   Other Topics Concern    None   Social History Narrative    None       Current Outpatient Medications   Medication Sig Dispense Refill    albuterol-ipratropium (DUO-NEB) 2.5 mg-0.5 mg/3 mL nebulizer solution USE ONE VIAL IN NEBULIZER EVERY 6 HOURS WHILE AWAKE 120 vial 5    amlodipine (NORVASC) 10 MG tablet Take 1 tablet (10 mg total) by mouth once daily. 90 tablet 3    apixaban 5 mg Tab Take 1 tablet (5 mg total) by mouth 2 (two) times daily. 60 tablet 3    ascorbic acid (VITAMIN C)  500 MG tablet Take 500 mg by mouth once daily.        aspirin (ECOTRIN) 81 MG EC tablet Take 81 mg by mouth once daily.        calcium carbonate (OS-TASIA) 500 mg calcium (1,250 mg) tablet Take 1 tablet by mouth 2 (two) times daily.        cloNIDine (CATAPRES) 0.1 MG tablet Take 1 tablet (0.1 mg total) by mouth 2 (two) times daily. Take one tablet by mouth every hour as needed for systolic blood pressure greater than 180.  Do not take more than 3 tablets in 24 hours. 90 tablet 3    ferrous sulfate 325 mg (65 mg iron) Tab tablet Take 1 tablet (325 mg total) by mouth 2 (two) times daily. 180 tablet 3    fish oil-omega-3 fatty acids 300-1,000 mg capsule Take 2 g by mouth every evening.       fluticasone (FLONASE) 50 mcg/actuation nasal spray 2 sprays by Each Nare route once daily. 48 g 3    folic acid-vit B6-vit B12 2.5-25-2 mg (FOLBIC OR EQUIV) 2.5-25-2 mg Tab Take 1 tablet by mouth once daily. 30 tablet 6    furosemide (LASIX) 40 MG tablet Take 40 mg by mouth 2 (two) times daily.      gabapentin (NEURONTIN) 300 MG capsule Take 1 capsule (300 mg total) by mouth every evening. 90 capsule 3    guanFACINE (TENEX) 1 MG Tab TAKE 1 TABLET BY MOUTH IN THE EVENING 90 tablet 3    levothyroxine (SYNTHROID) 100 MCG tablet Take 1 tablet (100 mcg total) by mouth before breakfast. 90 tablet 3    losartan (COZAAR) 100 MG tablet TAKE ONE TABLET BY MOUTH ONCE DAILY 90 tablet 3    magnesium oxide (MAG-OX) 400 mg tablet TAKE ONE TABLET BY MOUTH ONCE DAILY 90 tablet 3    montelukast (SINGULAIR) 10 mg tablet Take 1 tablet (10 mg total) by mouth every evening. 90 tablet 3    sertraline (ZOLOFT) 25 MG tablet Take 1 tablet (25 mg total) by mouth once daily. 30 tablet 0    simvastatin (ZOCOR) 40 MG tablet TAKE ONE TABLET BY MOUTH ONCE DAILY IN THE EVENING 90 tablet 0    traMADol (ULTRAM) 50 mg tablet Take 50 mg by mouth every 6 (six) hours as needed for Pain.      vitamin D 1000 units Tab Take 1 tablet (1,000 Units total) by  mouth once daily. 90 tablet 3    nitroGLYCERIN (NITROSTAT) 0.4 MG SL tablet Place 1 tablet (0.4 mg total) under the tongue every 5 (five) minutes as needed for Chest pain. As needed (Patient taking differently: Place 0.4 mg under the tongue every 5 (five) minutes as needed for Chest pain. Seek medical help if pain is not relieved after the third dose.) 30 tablet 3     No current facility-administered medications for this visit.        Review of patient's allergies indicates:   Allergen Reactions    Carvedilol Other (See Comments)     Bradycardia and syncope    Boniva [ibandronate]     Codeine     Hydralazine analogues     Iodinated contrast media - iv dye Hives    Lisinopril     Morphine          Review of Systems   Constitution: Negative for chills and fever.   Cardiovascular: Negative for claudication and leg swelling.   Skin: Positive for color change and nail changes.   Musculoskeletal: Positive for arthritis, joint pain and joint swelling.   Neurological: Positive for paresthesias. Negative for numbness.   Psychiatric/Behavioral: Negative for altered mental status.           Objective:      Physical Exam   Constitutional: She is oriented to person, place, and time. She appears well-developed and well-nourished. No distress.   Cardiovascular:   Pulses:       Dorsalis pedis pulses are 2+ on the right side, and 2+ on the left side.        Posterior tibial pulses are 1+ on the right side, and 1+ on the left side.   CFT <3 seconds bilateral.  Pedal hair growth decreased bilateral.  Varicosities noted bilateral.  Mild nonpitting edema noted bilateral.  Toes are cool to touch bilateral.     Musculoskeletal: She exhibits edema. She exhibits no tenderness.   Left foot there is acute pain with ankle ROM with focal warmth and redness surrounding the ankle.    Pain on palpation plantar medial and central heel left foot. No pain with ROM or MMT. No pain with medial and lateral compression of heel.    Muscle strength  5/5 in all muscle groups bilateral.  No tenderness nor crepitation with ROM of foot/ankle joints right side.  No tenderness with palpation of right foot and ankle.  Bilateral pes planus.  Bilateral hallux abducto valgus.  Bilateral semi-rigid contracture of toes 2-5.     Neurological: She is alert and oriented to person, place, and time. She has normal strength. A sensory deficit is present.   Protective sensation per Hemphill-Carmen monofilament intact bilateral.    Vibratory sensation decreased bilateral.    Light touch intact bilateral.   Skin: Skin is warm, dry and intact. Lesion noted. No abrasion, no bruising, no burn, no ecchymosis, no laceration, no petechiae and no rash noted. She is not diaphoretic. No cyanosis or erythema. No pallor. Nails show no clubbing.   Xerosis of pedal skin bilateral.  Pedal skin appears thin and atrophic bilateral. Toenails x 10 appear thickened by 3 mm's, elongated by 3 mm's, and discolored with subungual debris.  Hemosiderin staining noted to bilateral lower extremity.      Examination of the skin reveals no evidence of significant maceration, rashes, open lesions, suspicious appearing nevi or other concerning lesions.              Assessment:       Encounter Diagnoses   Name Primary?    Diabetic polyneuropathy associated with type 2 diabetes mellitus Yes    Onychomycosis due to dermatophyte     Acute gout of left ankle, unspecified cause     Acute left ankle pain     Plantar fasciitis of left foot     Acquired equinus deformity of both feet          Plan:       Blaire was seen today for foot pain, ankle pain, heel pain and diabetes mellitus.    Diagnoses and all orders for this visit:    Diabetic polyneuropathy associated with type 2 diabetes mellitus  -     X-Ray Ankle Complete Left; Future  -     X-Ray Foot Complete Left; Future  -     CBC auto differential; Future  -     C-reactive protein; Future  -     Sedimentation rate; Future  -     Uric acid;  Future    Onychomycosis due to dermatophyte    Acute gout of left ankle, unspecified cause  -     X-Ray Ankle Complete Left; Future  -     X-Ray Foot Complete Left; Future  -     CBC auto differential; Future  -     C-reactive protein; Future  -     Sedimentation rate; Future  -     Uric acid; Future    Acute left ankle pain  -     X-Ray Ankle Complete Left; Future  -     X-Ray Foot Complete Left; Future  -     CBC auto differential; Future  -     C-reactive protein; Future  -     Sedimentation rate; Future  -     Uric acid; Future    Plantar fasciitis of left foot  -     X-Ray Ankle Complete Left; Future  -     X-Ray Foot Complete Left; Future    Acquired equinus deformity of both feet      I counseled the patient on her conditions, their implications and medical management.    Advised to continue wearing diabetic shoes as they accommodate for digital deformities.      Shoe inspection. Diabetic Foot Education. Patient reminded of the importance of good nutrition and blood sugar control to help prevent podiatric complications of diabetes. Patient instructed on proper foot hygeine. We discussed wearing proper shoe gear, daily foot inspections, never walking without protective shoe gear, never putting sharp instruments to feet    With patient's permission, nails were aggressively reduced and debrided x 10 to their soft tissue attachment mechanically removing all offending nail and debris.  Patient relates relief following the procedure. She will continue to monitor the areas daily, inspect her feet, wear protective shoe gear when ambulatory, moisturizer to maintain skin integrity.    Discussed the etiology of the ankle pain, with the redness and swelling differentials include gout, infection, charcot or an acute injury she may not remember. Blood work to rule out infecion, I believe this may be a gout flare. Due to the pain she was dispensed and gait trained in an orthopedic boot for ambulation, continue to use the  walker for stability.    X-ray to rule out osseous abnormality such as charcot.    Will call with results, if uric acid is high will send an anti inflammatory to her pharmacy    Follow up 2 weeks for acute pain wearing the boot    Emerson Chan DPM

## 2018-08-30 RX ORDER — METHYLPREDNISOLONE 4 MG/1
TABLET ORAL
Qty: 1 PACKAGE | Refills: 0 | Status: SHIPPED | OUTPATIENT
Start: 2018-08-30 | End: 2018-09-13

## 2018-09-12 ENCOUNTER — DOCUMENTATION ONLY (OUTPATIENT)
Dept: FAMILY MEDICINE | Facility: CLINIC | Age: 83
End: 2018-09-12

## 2018-09-12 NOTE — PROGRESS NOTES
Pre-Visit Chart Review  For Appointment Scheduled on 09/13/2018    Health Maintenance Due   Topic Date Due    TETANUS VACCINE  07/21/1948    Eye Exam  10/30/2016    Foot Exam  11/29/2017    Lipid Panel  05/23/2018    Influenza Vaccine  08/01/2018

## 2018-09-13 ENCOUNTER — HOSPITAL ENCOUNTER (INPATIENT)
Facility: HOSPITAL | Age: 83
LOS: 3 days | Discharge: HOME HEALTH | DRG: 682 | End: 2018-09-16
Attending: EMERGENCY MEDICINE | Admitting: INTERNAL MEDICINE
Payer: MEDICARE

## 2018-09-13 ENCOUNTER — OFFICE VISIT (OUTPATIENT)
Dept: FAMILY MEDICINE | Facility: CLINIC | Age: 83
End: 2018-09-13
Payer: MEDICARE

## 2018-09-13 VITALS
BODY MASS INDEX: 23.04 KG/M2 | WEIGHT: 130.06 LBS | TEMPERATURE: 98 F | DIASTOLIC BLOOD PRESSURE: 38 MMHG | HEIGHT: 63 IN | SYSTOLIC BLOOD PRESSURE: 97 MMHG | HEART RATE: 64 BPM

## 2018-09-13 DIAGNOSIS — R07.9 CHEST PAIN: ICD-10-CM

## 2018-09-13 DIAGNOSIS — J44.0 COPD (CHRONIC OBSTRUCTIVE PULMONARY DISEASE) WITH ACUTE BRONCHITIS: ICD-10-CM

## 2018-09-13 DIAGNOSIS — N17.9 AKI (ACUTE KIDNEY INJURY): ICD-10-CM

## 2018-09-13 DIAGNOSIS — E87.5 HYPERKALEMIA: Primary | ICD-10-CM

## 2018-09-13 DIAGNOSIS — E44.0 MODERATE MALNUTRITION: ICD-10-CM

## 2018-09-13 DIAGNOSIS — E11.59 HYPERTENSION ASSOCIATED WITH DIABETES: ICD-10-CM

## 2018-09-13 DIAGNOSIS — E86.0 DEHYDRATION: ICD-10-CM

## 2018-09-13 DIAGNOSIS — R53.83 LETHARGIC: ICD-10-CM

## 2018-09-13 DIAGNOSIS — R53.83 FATIGUE: ICD-10-CM

## 2018-09-13 DIAGNOSIS — J20.9 COPD (CHRONIC OBSTRUCTIVE PULMONARY DISEASE) WITH ACUTE BRONCHITIS: ICD-10-CM

## 2018-09-13 DIAGNOSIS — I95.9 HYPOTENSION, UNSPECIFIED HYPOTENSION TYPE: Primary | ICD-10-CM

## 2018-09-13 DIAGNOSIS — I15.2 HYPERTENSION ASSOCIATED WITH DIABETES: ICD-10-CM

## 2018-09-13 DIAGNOSIS — D64.9 CHRONIC ANEMIA: ICD-10-CM

## 2018-09-13 DIAGNOSIS — I50.33 ACUTE ON CHRONIC DIASTOLIC HEART FAILURE: ICD-10-CM

## 2018-09-13 DIAGNOSIS — J44.1 COPD WITH ACUTE EXACERBATION: ICD-10-CM

## 2018-09-13 DIAGNOSIS — N18.30 CKD (CHRONIC KIDNEY DISEASE), STAGE III: ICD-10-CM

## 2018-09-13 LAB
ALBUMIN SERPL BCP-MCNC: 3 G/DL
ALP SERPL-CCNC: 65 U/L
ALT SERPL W/O P-5'-P-CCNC: 60 U/L
ANION GAP SERPL CALC-SCNC: 11 MMOL/L
AST SERPL-CCNC: 56 U/L
BACTERIA #/AREA URNS HPF: ABNORMAL /HPF
BASOPHILS # BLD AUTO: 0 K/UL
BASOPHILS NFR BLD: 0.3 %
BILIRUB SERPL-MCNC: 0.6 MG/DL
BILIRUB UR QL STRIP: NEGATIVE
BUN SERPL-MCNC: 57 MG/DL
CALCIUM SERPL-MCNC: 9.3 MG/DL
CHLORIDE SERPL-SCNC: 103 MMOL/L
CLARITY UR: CLEAR
CO2 SERPL-SCNC: 22 MMOL/L
COLOR UR: YELLOW
CREAT SERPL-MCNC: 2.5 MG/DL
DIFFERENTIAL METHOD: ABNORMAL
EOSINOPHIL # BLD AUTO: 0.2 K/UL
EOSINOPHIL NFR BLD: 3.2 %
ERYTHROCYTE [DISTWIDTH] IN BLOOD BY AUTOMATED COUNT: 14 %
EST. GFR  (AFRICAN AMERICAN): 19 ML/MIN/1.73 M^2
EST. GFR  (NON AFRICAN AMERICAN): 17 ML/MIN/1.73 M^2
GLUCOSE SERPL-MCNC: 144 MG/DL
GLUCOSE UR QL STRIP: NEGATIVE
HCT VFR BLD AUTO: 26 %
HGB BLD-MCNC: 8.6 G/DL
HGB UR QL STRIP: ABNORMAL
HYALINE CASTS #/AREA URNS LPF: 40 /LPF
KETONES UR QL STRIP: NEGATIVE
LEUKOCYTE ESTERASE UR QL STRIP: NEGATIVE
LYMPHOCYTES # BLD AUTO: 1.1 K/UL
LYMPHOCYTES NFR BLD: 14.7 %
MCH RBC QN AUTO: 31.9 PG
MCHC RBC AUTO-ENTMCNC: 33.1 G/DL
MCV RBC AUTO: 96 FL
MICROSCOPIC COMMENT: ABNORMAL
MONOCYTES # BLD AUTO: 0.9 K/UL
MONOCYTES NFR BLD: 12.4 %
NEUTROPHILS # BLD AUTO: 5.3 K/UL
NEUTROPHILS NFR BLD: 69.4 %
NITRITE UR QL STRIP: NEGATIVE
PH UR STRIP: 6 [PH] (ref 5–8)
PLATELET # BLD AUTO: 196 K/UL
PMV BLD AUTO: 9.3 FL
POCT GLUCOSE: 142 MG/DL (ref 70–110)
POCT GLUCOSE: 190 MG/DL (ref 70–110)
POTASSIUM SERPL-SCNC: 6.2 MMOL/L
PROT SERPL-MCNC: 6.5 G/DL
PROT UR QL STRIP: NEGATIVE
RBC # BLD AUTO: 2.71 M/UL
RBC #/AREA URNS HPF: 40 /HPF (ref 0–4)
SODIUM SERPL-SCNC: 136 MMOL/L
SP GR UR STRIP: 1.01 (ref 1–1.03)
SQUAMOUS #/AREA URNS HPF: 5 /HPF
T4 FREE SERPL-MCNC: 0.92 NG/DL
TROPONIN I SERPL DL<=0.01 NG/ML-MCNC: <0.006 NG/ML
TSH SERPL DL<=0.005 MIU/L-ACNC: 0.19 UIU/ML
URN SPEC COLLECT METH UR: ABNORMAL
UROBILINOGEN UR STRIP-ACNC: NEGATIVE EU/DL
WBC # BLD AUTO: 7.6 K/UL
WBC #/AREA URNS HPF: 2 /HPF (ref 0–5)

## 2018-09-13 PROCEDURE — 99499 UNLISTED E&M SERVICE: CPT | Mod: S$GLB,,, | Performed by: PHYSICIAN ASSISTANT

## 2018-09-13 PROCEDURE — 96361 HYDRATE IV INFUSION ADD-ON: CPT

## 2018-09-13 PROCEDURE — 99900035 HC TECH TIME PER 15 MIN (STAT)

## 2018-09-13 PROCEDURE — 12000002 HC ACUTE/MED SURGE SEMI-PRIVATE ROOM

## 2018-09-13 PROCEDURE — 96374 THER/PROPH/DIAG INJ IV PUSH: CPT

## 2018-09-13 PROCEDURE — 96375 TX/PRO/DX INJ NEW DRUG ADDON: CPT

## 2018-09-13 PROCEDURE — 80053 COMPREHEN METABOLIC PANEL: CPT

## 2018-09-13 PROCEDURE — 25000242 PHARM REV CODE 250 ALT 637 W/ HCPCS: Performed by: EMERGENCY MEDICINE

## 2018-09-13 PROCEDURE — 25000003 PHARM REV CODE 250: Performed by: EMERGENCY MEDICINE

## 2018-09-13 PROCEDURE — 84484 ASSAY OF TROPONIN QUANT: CPT

## 2018-09-13 PROCEDURE — 99214 OFFICE O/P EST MOD 30 MIN: CPT | Mod: PBBFAC,25,PO | Performed by: PHYSICIAN ASSISTANT

## 2018-09-13 PROCEDURE — 94640 AIRWAY INHALATION TREATMENT: CPT

## 2018-09-13 PROCEDURE — 25000003 PHARM REV CODE 250: Performed by: NURSE PRACTITIONER

## 2018-09-13 PROCEDURE — 84443 ASSAY THYROID STIM HORMONE: CPT

## 2018-09-13 PROCEDURE — 99999 PR PBB SHADOW E&M-EST. PATIENT-LVL IV: CPT | Mod: PBBFAC,,, | Performed by: PHYSICIAN ASSISTANT

## 2018-09-13 PROCEDURE — 93005 ELECTROCARDIOGRAM TRACING: CPT

## 2018-09-13 PROCEDURE — 81000 URINALYSIS NONAUTO W/SCOPE: CPT

## 2018-09-13 PROCEDURE — 63600175 PHARM REV CODE 636 W HCPCS: Performed by: EMERGENCY MEDICINE

## 2018-09-13 PROCEDURE — 36415 COLL VENOUS BLD VENIPUNCTURE: CPT

## 2018-09-13 PROCEDURE — 99285 EMERGENCY DEPT VISIT HI MDM: CPT | Mod: 25,27

## 2018-09-13 PROCEDURE — 99214 OFFICE O/P EST MOD 30 MIN: CPT | Mod: S$PBB,,, | Performed by: PHYSICIAN ASSISTANT

## 2018-09-13 PROCEDURE — 84439 ASSAY OF FREE THYROXINE: CPT

## 2018-09-13 PROCEDURE — 85025 COMPLETE CBC W/AUTO DIFF WBC: CPT

## 2018-09-13 PROCEDURE — 1101F PT FALLS ASSESS-DOCD LE1/YR: CPT | Mod: CPTII,,, | Performed by: PHYSICIAN ASSISTANT

## 2018-09-13 RX ORDER — GLUCAGON 1 MG
1 KIT INJECTION
Status: DISCONTINUED | OUTPATIENT
Start: 2018-09-13 | End: 2018-09-17 | Stop reason: HOSPADM

## 2018-09-13 RX ORDER — DEXTROSE 50 % IN WATER (D50W) INTRAVENOUS SYRINGE
25
Status: COMPLETED | OUTPATIENT
Start: 2018-09-13 | End: 2018-09-13

## 2018-09-13 RX ORDER — LEVOTHYROXINE SODIUM 100 UG/1
100 TABLET ORAL
Status: DISCONTINUED | OUTPATIENT
Start: 2018-09-14 | End: 2018-09-17 | Stop reason: HOSPADM

## 2018-09-13 RX ORDER — SODIUM,POTASSIUM PHOSPHATES 280-250MG
2 POWDER IN PACKET (EA) ORAL
Status: DISCONTINUED | OUTPATIENT
Start: 2018-09-13 | End: 2018-09-17 | Stop reason: HOSPADM

## 2018-09-13 RX ORDER — ACETAMINOPHEN 325 MG/1
650 TABLET ORAL EVERY 6 HOURS PRN
Status: DISCONTINUED | OUTPATIENT
Start: 2018-09-13 | End: 2018-09-17 | Stop reason: HOSPADM

## 2018-09-13 RX ORDER — ONDANSETRON 2 MG/ML
8 INJECTION INTRAMUSCULAR; INTRAVENOUS EVERY 8 HOURS PRN
Status: DISCONTINUED | OUTPATIENT
Start: 2018-09-13 | End: 2018-09-17 | Stop reason: HOSPADM

## 2018-09-13 RX ORDER — AMOXICILLIN 250 MG
1 CAPSULE ORAL DAILY PRN
Status: DISCONTINUED | OUTPATIENT
Start: 2018-09-13 | End: 2018-09-17 | Stop reason: HOSPADM

## 2018-09-13 RX ORDER — ONDANSETRON 2 MG/ML
4 INJECTION INTRAMUSCULAR; INTRAVENOUS EVERY 8 HOURS PRN
Status: DISCONTINUED | OUTPATIENT
Start: 2018-09-13 | End: 2018-09-17 | Stop reason: HOSPADM

## 2018-09-13 RX ORDER — ALBUTEROL SULFATE 2.5 MG/.5ML
5 SOLUTION RESPIRATORY (INHALATION)
Status: COMPLETED | OUTPATIENT
Start: 2018-09-13 | End: 2018-09-13

## 2018-09-13 RX ORDER — INSULIN ASPART 100 [IU]/ML
0-5 INJECTION, SOLUTION INTRAVENOUS; SUBCUTANEOUS
Status: DISCONTINUED | OUTPATIENT
Start: 2018-09-13 | End: 2018-09-17 | Stop reason: HOSPADM

## 2018-09-13 RX ORDER — SIMVASTATIN 40 MG/1
40 TABLET, FILM COATED ORAL NIGHTLY
Status: DISCONTINUED | OUTPATIENT
Start: 2018-09-13 | End: 2018-09-17 | Stop reason: HOSPADM

## 2018-09-13 RX ORDER — MONTELUKAST SODIUM 10 MG/1
10 TABLET ORAL NIGHTLY
Status: DISCONTINUED | OUTPATIENT
Start: 2018-09-13 | End: 2018-09-17 | Stop reason: HOSPADM

## 2018-09-13 RX ORDER — SODIUM CHLORIDE 0.9 % (FLUSH) 0.9 %
5 SYRINGE (ML) INJECTION
Status: DISCONTINUED | OUTPATIENT
Start: 2018-09-13 | End: 2018-09-17 | Stop reason: HOSPADM

## 2018-09-13 RX ORDER — POTASSIUM CHLORIDE 20 MEQ/15ML
40 SOLUTION ORAL
Status: DISCONTINUED | OUTPATIENT
Start: 2018-09-13 | End: 2018-09-17 | Stop reason: HOSPADM

## 2018-09-13 RX ORDER — ACETAMINOPHEN 325 MG/1
650 TABLET ORAL EVERY 4 HOURS PRN
Status: DISCONTINUED | OUTPATIENT
Start: 2018-09-13 | End: 2018-09-17 | Stop reason: HOSPADM

## 2018-09-13 RX ORDER — SERTRALINE HYDROCHLORIDE 25 MG/1
25 TABLET, FILM COATED ORAL DAILY
Status: DISCONTINUED | OUTPATIENT
Start: 2018-09-14 | End: 2018-09-17 | Stop reason: HOSPADM

## 2018-09-13 RX ORDER — IPRATROPIUM BROMIDE AND ALBUTEROL SULFATE 2.5; .5 MG/3ML; MG/3ML
3 SOLUTION RESPIRATORY (INHALATION) EVERY 6 HOURS PRN
Status: DISCONTINUED | OUTPATIENT
Start: 2018-09-13 | End: 2018-09-15

## 2018-09-13 RX ORDER — SODIUM CHLORIDE 9 MG/ML
INJECTION, SOLUTION INTRAVENOUS CONTINUOUS
Status: DISCONTINUED | OUTPATIENT
Start: 2018-09-13 | End: 2018-09-14

## 2018-09-13 RX ORDER — ASPIRIN 81 MG/1
81 TABLET ORAL DAILY
Status: DISCONTINUED | OUTPATIENT
Start: 2018-09-14 | End: 2018-09-17 | Stop reason: HOSPADM

## 2018-09-13 RX ORDER — SODIUM BICARBONATE 1 MEQ/ML
50 SYRINGE (ML) INTRAVENOUS
Status: COMPLETED | OUTPATIENT
Start: 2018-09-13 | End: 2018-09-13

## 2018-09-13 RX ORDER — GABAPENTIN 300 MG/1
300 CAPSULE ORAL NIGHTLY
Status: DISCONTINUED | OUTPATIENT
Start: 2018-09-13 | End: 2018-09-17 | Stop reason: HOSPADM

## 2018-09-13 RX ORDER — LANOLIN ALCOHOL/MO/W.PET/CERES
800 CREAM (GRAM) TOPICAL
Status: DISCONTINUED | OUTPATIENT
Start: 2018-09-13 | End: 2018-09-17 | Stop reason: HOSPADM

## 2018-09-13 RX ORDER — IBUPROFEN 200 MG
24 TABLET ORAL
Status: DISCONTINUED | OUTPATIENT
Start: 2018-09-13 | End: 2018-09-17 | Stop reason: HOSPADM

## 2018-09-13 RX ORDER — IBUPROFEN 200 MG
16 TABLET ORAL
Status: DISCONTINUED | OUTPATIENT
Start: 2018-09-13 | End: 2018-09-17 | Stop reason: HOSPADM

## 2018-09-13 RX ADMIN — SODIUM CHLORIDE: 0.9 INJECTION, SOLUTION INTRAVENOUS at 08:09

## 2018-09-13 RX ADMIN — GABAPENTIN 300 MG: 300 CAPSULE ORAL at 08:09

## 2018-09-13 RX ADMIN — MONTELUKAST SODIUM 10 MG: 10 TABLET, COATED ORAL at 08:09

## 2018-09-13 RX ADMIN — SODIUM POLYSTYRENE SULFONATE 15 G: 15 SUSPENSION ORAL; RECTAL at 06:09

## 2018-09-13 RX ADMIN — SODIUM BICARBONATE 50 MEQ: 84 INJECTION, SOLUTION INTRAVENOUS at 11:09

## 2018-09-13 RX ADMIN — APIXABAN 2.5 MG: 2.5 TABLET, FILM COATED ORAL at 08:09

## 2018-09-13 RX ADMIN — INSULIN HUMAN 5 UNITS: 100 INJECTION, SOLUTION PARENTERAL at 11:09

## 2018-09-13 RX ADMIN — SODIUM CHLORIDE: 0.9 INJECTION, SOLUTION INTRAVENOUS at 12:09

## 2018-09-13 RX ADMIN — ALBUTEROL SULFATE 5 MG: 2.5 SOLUTION RESPIRATORY (INHALATION) at 10:09

## 2018-09-13 RX ADMIN — SIMVASTATIN 40 MG: 40 TABLET, FILM COATED ORAL at 08:09

## 2018-09-13 RX ADMIN — SODIUM CHLORIDE 1000 ML: 0.9 INJECTION, SOLUTION INTRAVENOUS at 10:09

## 2018-09-13 RX ADMIN — DEXTROSE MONOHYDRATE 25 G: 500 INJECTION PARENTERAL at 11:09

## 2018-09-13 NOTE — PLAN OF CARE
Problem: Patient Care Overview  Goal: Plan of Care Review  Outcome: Ongoing (interventions implemented as appropriate)  Pt assisted to restroom by daughter and nurse. Pt voided the whole way to the bathroom. Pt was distressed that she had an accident. Clean underware and socks and gown provided. Pt instructed by daughter to call before getting up. Bed alarm set.

## 2018-09-13 NOTE — ED NOTES
Pt awake alert in no acute distress, pt family reports pt was referred to ED for Hypotension from PCP, denies chest pain or sob denies abd pain denies n/v/d denies fever, family at bedside pt placed on cardiac monitor with bp cuff and pulse ox

## 2018-09-13 NOTE — PROGRESS NOTES
Subjective:       Patient ID: Blaire Cage is a 88 y.o. female.    Chief Complaint: Depression    Patient with chronic kidney disease stage 3, hypertension, COPD, anemia of chronic disease, and PE/ DVT on chronic anticoagulation presents for evaluation of malaise fatigue and depression.  Patient's daughter explains that for the past 2 weeks the patient has seemed more depressed than her usual state.  Patient's appetite has been poor.  The patient is sleeping much more than she usually does on a daily basis.        Review of Systems   Constitutional: Positive for activity change, appetite change and fatigue. Negative for chills, diaphoresis, fever and unexpected weight change.   Eyes: Negative for visual disturbance.   Respiratory: Negative for cough, chest tightness, shortness of breath and wheezing.    Cardiovascular: Negative for chest pain, palpitations and leg swelling.   Gastrointestinal: Negative for abdominal pain, constipation, diarrhea and nausea.   Endocrine: Negative for polydipsia and polyuria.   Genitourinary: Negative for decreased urine volume, difficulty urinating, dysuria, enuresis, flank pain, frequency and urgency.   Musculoskeletal: Positive for arthralgias and gait problem.   Skin: Negative for rash.   Neurological: Positive for weakness and light-headedness. Negative for dizziness and headaches.   Psychiatric/Behavioral: Positive for dysphoric mood. Negative for sleep disturbance. The patient is not nervous/anxious.        Objective:      Physical Exam   Constitutional: She is oriented to person, place, and time. She is cooperative. She appears ill. No distress.   HENT:   Head: Normocephalic and atraumatic.   Mouth/Throat: Mucous membranes are pale and dry.   Eyes: Conjunctivae and EOM are normal. Pupils are equal, round, and reactive to light. No scleral icterus.   Cardiovascular: Normal rate, regular rhythm and normal heart sounds.   Pulmonary/Chest: Effort normal and breath sounds  normal.   Abdominal: Soft. Bowel sounds are normal.   Musculoskeletal:        Right lower leg: She exhibits no edema.        Left lower leg: She exhibits no edema.   Neurological: She is alert and oriented to person, place, and time. She has normal strength. No cranial nerve deficit or sensory deficit. She displays a negative Romberg sign.   Skin: Skin is warm and dry. Capillary refill takes more than 3 seconds. There is pallor.   Psychiatric: She has a normal mood and affect. Her speech is normal. Judgment normal. Cognition and memory are normal.   Vitals reviewed.      Assessment:       1. Hypotension, unspecified hypotension type    2. Moderate malnutrition    3. Lethargic    4. CKD (chronic kidney disease), stage III, eGFR 39, progressive 31    5. Hypertension associated with diabetes    6. Chronic anemia        Plan:       Blaire was seen today for depression.    Diagnoses and all orders for this visit:    Hypotension, unspecified hypotension type  -     Refer to Emergency Dept.    Moderate malnutrition    Lethargic    CKD (chronic kidney disease), stage III, eGFR 39, progressive 31    Hypertension associated with diabetes    Chronic anemia

## 2018-09-13 NOTE — H&P
"[facility] Hospital Medicine  History & Physical    Patient Name: Blaire Cage  MRN: [id number]  Admission Date: 9/13/2018  Attending Physician: Sadie Saeed NP  Primary Care Provider: Eli Edmonds MD         Patient information was obtained from patient, relative(s) and ER records.     Subjective:     Principal Problem:SHALINI (acute kidney injury)    Chief Complaint:   Chief Complaint   Patient presents with    Hypotension     Sent to ED by PCP for hypotension. States she went to PCP today for help with nursing home placement.         HPI: Blaire Cage is a 88 y.o. female with PMH of hypertension, DM type 2, hyperlipidemia, COPD, hypothyroidism, and pulmonary emboli on anticoauglation presents to the ED for evaluation of hypotension and generalized weakness. She was evaluated by ELLIE Ledbetter this am and was noted to have hypotension (no BP noted in primary note).  According to her daughter, she has been feeling "bad for almost 2 weeks, she doesn't feel like doing anything. She is not eating much and needs help walking."  In the ED she was noted to have a blood pressure of 94/53 and evidence of SHALINI with Cr 2.5. Baseline renal function is 1.8. She denies dizziness, lightheadedness and shortness of breath.       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 8/7/2018    Hip arthritis 3/1/2016    Homocysteinemia 8/7/2018    Hyperlipidemia     Hypertension     Normocytic normochromic anemia 6/29/2018    PE (pulmonary thromboembolism) 06/09/2018    " Pneumonia of right lower lobe due to infectious organism 9/11/2017    Thyroid disease     hypothyroid       Past Surgical History:   Procedure Laterality Date    APPENDECTOMY      ESOPHAGOGASTRODUODENOSCOPY (EGD) N/A 10/25/2017    Performed by [facility] at [facility] ENDO    FRACTURE SURGERY      right hip     HERNIA REPAIR      groin    HYSTERECTOMY      VARICOSE VEIN SURGERY         Review of patient's allergies indicates:   Allergen Reactions    Carvedilol Other (See Comments)     Bradycardia and syncope    Boniva [ibandronate]     Codeine     Hydralazine analogues     Iodinated contrast- oral and iv dye Hives    Kionex [sodium polystyrene sulfonate] Diarrhea     From encounter 12/11/17 for diarrhea--not true allergy.    Lisinopril     Morphine        No current facility-administered medications on file prior to encounter.      Current Outpatient Medications on File Prior to Encounter   Medication Sig    amlodipine (NORVASC) 10 MG tablet Take 1 tablet (10 mg total) by mouth once daily. (Patient taking differently: Take 10 mg by mouth once daily. )    apixaban 5 mg Tab Take 1 tablet (5 mg total) by mouth 2 (two) times daily.    ascorbic acid (VITAMIN C) 500 MG tablet Take 500 mg by mouth once daily.      aspirin (ECOTRIN) 81 MG EC tablet Take 81 mg by mouth once daily.      calcium carbonate (OS-TASIA) 500 mg calcium (1,250 mg) tablet Take 1 tablet by mouth 2 (two) times daily.      cloNIDine (CATAPRES) 0.1 MG tablet Take 1 tablet (0.1 mg total) by mouth 2 (two) times daily. Take one tablet by mouth every hour as needed for systolic blood pressure greater than 180.  Do not take more than 3 tablets in 24 hours.    ferrous sulfate 325 mg (65 mg iron) Tab tablet Take 1 tablet (325 mg total) by mouth 2 (two) times daily.    fish oil-omega-3 fatty acids 300-1,000 mg capsule Take 2 g by mouth every evening.     fluticasone (FLONASE) 50 mcg/actuation nasal spray 2 sprays by Each Nare route once  daily.    folic acid-vit B6-vit B12 2.5-25-2 mg (FOLBIC OR EQUIV) 2.5-25-2 mg Tab Take 1 tablet by mouth once daily.    furosemide (LASIX) 40 MG tablet Take 40 mg by mouth 2 (two) times daily.    gabapentin (NEURONTIN) 300 MG capsule Take 1 capsule (300 mg total) by mouth every evening.    guanFACINE (TENEX) 1 MG Tab TAKE 1 TABLET BY MOUTH IN THE EVENING    levothyroxine (SYNTHROID) 100 MCG tablet Take 1 tablet (100 mcg total) by mouth before breakfast.    losartan (COZAAR) 100 MG tablet TAKE ONE TABLET BY MOUTH ONCE DAILY    magnesium oxide (MAG-OX) 400 mg tablet TAKE ONE TABLET BY MOUTH ONCE DAILY    montelukast (SINGULAIR) 10 mg tablet Take 1 tablet (10 mg total) by mouth every evening.    sertraline (ZOLOFT) 25 MG tablet Take 1 tablet (25 mg total) by mouth once daily.    simvastatin (ZOCOR) 40 MG tablet TAKE ONE TABLET BY MOUTH ONCE DAILY IN THE EVENING    vitamin D 1000 units Tab Take 1 tablet (1,000 Units total) by mouth once daily.    albuterol-ipratropium (DUO-NEB) 2.5 mg-0.5 mg/3 mL nebulizer solution USE ONE VIAL IN NEBULIZER EVERY 6 HOURS WHILE AWAKE    nitroGLYCERIN (NITROSTAT) 0.4 MG SL tablet Place 1 tablet (0.4 mg total) under the tongue every 5 (five) minutes as needed for Chest pain. As needed (Patient taking differently: Place 0.4 mg under the tongue every 5 (five) minutes as needed for Chest pain. Seek medical help if pain is not relieved after the third dose.)    traMADol (ULTRAM) 50 mg tablet Take 50 mg by mouth every 6 (six) hours as needed for Pain.    [DISCONTINUED] methylPREDNISolone (MEDROL DOSEPACK) 4 mg tablet use as directed     Family History     Problem Relation (Age of Onset)    Diabetes Sister, Brother    Heart disease Mother    Hypertension Mother    Kidney disease Son    Lung disease Father        Tobacco Use    Smoking status: Former Smoker     Packs/day: 0.35     Years: 44.00     Pack years: 15.40     Types: Cigarettes     Last attempt to quit: 4/30/2015      Years since quitting: 3.3    Smokeless tobacco: Never Used    Tobacco comment: 1/08/2015   Substance and Sexual Activity    Alcohol use: No     Alcohol/week: 0.0 oz    Drug use: No    Sexual activity: No     Review of Systems   Constitutional: Positive for activity change, appetite change and fatigue. Negative for diaphoresis and fever.   HENT: Negative for facial swelling and postnasal drip.    Eyes: Negative for photophobia and visual disturbance.   Respiratory: Negative for cough and shortness of breath.    Cardiovascular: Negative for chest pain and leg swelling.   Gastrointestinal: Negative for abdominal distention, abdominal pain and constipation.   Endocrine: Negative for polyphagia and polyuria.   Genitourinary: Negative for flank pain and hematuria.   Musculoskeletal: Positive for gait problem. Negative for back pain and joint swelling.   Skin: Negative for rash.   Neurological: Negative for speech difficulty and numbness.   Psychiatric/Behavioral: Negative for behavioral problems. The patient is not hyperactive.      Objective:     Vital Signs (Most Recent):  Temp: 97.1 °F (36.2 °C) (09/13/18 1538)  Pulse: 63 (09/13/18 1538)  Resp: 20 (09/13/18 1538)  BP: (!) 121/59 (09/13/18 1538)  SpO2: 95 % (09/13/18 1538) Vital Signs (24h Range):  Temp:  [96.6 °F (35.9 °C)-98.3 °F (36.8 °C)] 97.1 °F (36.2 °C)  Pulse:  [59-71] 63  Resp:  [16-20] 20  SpO2:  [92 %-97 %] 95 %  BP: ()/(38-59) 121/59     Weight: 59 kg (130 lb 1.1 oz)  Body mass index is 23.04 kg/m².    Physical Exam   Constitutional: She is oriented to person, place, and time. She appears well-developed and well-nourished.   Generalized weakness   HENT:   Head: Normocephalic and atraumatic.   Right Ear: External ear normal.   Left Ear: External ear normal.   Mouth/Throat: Oropharynx is clear and moist.   Eyes: Conjunctivae and EOM are normal. Pupils are equal, round, and reactive to light.   Neck: Normal range of motion. Neck supple.    Cardiovascular: Normal rate, regular rhythm and intact distal pulses.   Murmur (ASM) heard.  Pulmonary/Chest: Effort normal and breath sounds normal. She has no wheezes.   Abdominal: Soft. Bowel sounds are normal. There is no tenderness.   Musculoskeletal: Normal range of motion.   Neurological: She is alert and oriented to person, place, and time. Coordination normal.   Skin: Skin is warm and dry. No erythema.   Psychiatric: She has a normal mood and affect. Her behavior is normal. Judgment normal.   Nursing note and vitals reviewed.        CRANIAL NERVES     CN III, IV, VI   Pupils are equal, round, and reactive to light.  Extraocular motions are normal.        Significant Labs:   BMP:   Recent Labs   Lab  09/13/18   0957   GLU  144*   NA  136   K  6.2*   CL  103   CO2  22*   BUN  57*   CREATININE  2.5*   CALCIUM  9.3     CBC:   Recent Labs   Lab  09/13/18   0957   WBC  7.60   HGB  8.6*   HCT  26.0*   PLT  196       Significant Imaging: I have reviewed and interpreted all pertinent imaging results/findings within the past 24 hours.   CXR no acute process    Assessment/Plan:     * SHALINI (acute kidney injury)    Initiated on IV normal saline. Decrease to 100 cc/hr  Renal diet  Avoid nephrotoxic agents, hold ARB/ACEI and no NSAIDS.   Renal dose medications.  Trend renal function.  Baseline Cr 1.8          Hyperkalemia    Given insulin and bicarb in ED  Kayexalate x 1 dose  Renal diet  Trend K          Anemia, chronic renal failure, stage 3 (moderate)    Trend H/H  Transfuse for hgb <7            PE (pulmonary thromboembolism)    Resume home oral anticoagulation.           Controlled type 2 diabetes mellitus, without long-term current use of insulin    Diet controlled.  accuchecks AC and HS. Insulin correction scale          COPD (chronic obstructive pulmonary disease)    Resume home bronchodilator           Type 2 diabetes mellitus with stage 3 chronic kidney disease    Baseline Cr 1.8          Hyperlipidemia  associated with type 2 diabetes mellitus    Chronic resume statin          Hypertension associated with diabetes    Chronic. Resume home antihypertensives. Hold ARB/ ACE          Hypothyroidism    Chronic. Resume home levothyroxine            VTE Risk Mitigation (From admission, onward)        Ordered     apixaban tablet 2.5 mg  2 times daily      09/13/18 1601         discussed POC with patient and daughter.   Discussion living will. She will speak with siblings tomorrow to discuss code status.    Time spent seeing patient( greater than 1/2 spent in direct contact) : 65 min    Sadie Saeed NP  Department of Hospital Medicine   [facility]

## 2018-09-13 NOTE — SUBJECTIVE & OBJECTIVE
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 8/7/2018    Hip arthritis 3/1/2016    Homocysteinemia 8/7/2018    Hyperlipidemia     Hypertension     Normocytic normochromic anemia 6/29/2018    PE (pulmonary thromboembolism) 06/09/2018    Pneumonia of right lower lobe due to infectious organism 9/11/2017    Thyroid disease     hypothyroid       Past Surgical History:   Procedure Laterality Date    APPENDECTOMY      ESOPHAGOGASTRODUODENOSCOPY (EGD) N/A 10/25/2017    [facility]    FRACTURE SURGERY      right hip     HERNIA REPAIR      groin    HYSTERECTOMY      VARICOSE VEIN SURGERY         Review of patient's allergies indicates:   Allergen Reactions    Carvedilol Other (See Comments)     Bradycardia and syncope    Boniva [ibandronate]     Codeine     Hydralazine analogues     Iodinated contrast- oral and iv dye Hives    Kionex [sodium polystyrene sulfonate] Diarrhea     From encounter 12/11/17 for diarrhea--not true allergy.    Lisinopril     Morphine        No current facility-administered medications on file prior to encounter.      Current Outpatient Medications on File Prior to Encounter   Medication Sig    amlodipine (NORVASC) 10 MG tablet Take 1 tablet (10 mg total) by mouth once daily. (Patient taking differently: Take 10 mg by mouth once daily. )    apixaban 5 mg Tab Take 1 tablet (5 mg total) by mouth 2 (two) times daily.    ascorbic acid (VITAMIN C) 500 MG tablet Take 500 mg by mouth once daily.      aspirin (ECOTRIN) 81 MG EC  tablet Take 81 mg by mouth once daily.      calcium carbonate (OS-TASIA) 500 mg calcium (1,250 mg) tablet Take 1 tablet by mouth 2 (two) times daily.      cloNIDine (CATAPRES) 0.1 MG tablet Take 1 tablet (0.1 mg total) by mouth 2 (two) times daily. Take one tablet by mouth every hour as needed for systolic blood pressure greater than 180.  Do not take more than 3 tablets in 24 hours.    ferrous sulfate 325 mg (65 mg iron) Tab tablet Take 1 tablet (325 mg total) by mouth 2 (two) times daily.    fish oil-omega-3 fatty acids 300-1,000 mg capsule Take 2 g by mouth every evening.     fluticasone (FLONASE) 50 mcg/actuation nasal spray 2 sprays by Each Nare route once daily.    folic acid-vit B6-vit B12 2.5-25-2 mg (FOLBIC OR EQUIV) 2.5-25-2 mg Tab Take 1 tablet by mouth once daily.    furosemide (LASIX) 40 MG tablet Take 40 mg by mouth 2 (two) times daily.    gabapentin (NEURONTIN) 300 MG capsule Take 1 capsule (300 mg total) by mouth every evening.    guanFACINE (TENEX) 1 MG Tab TAKE 1 TABLET BY MOUTH IN THE EVENING    levothyroxine (SYNTHROID) 100 MCG tablet Take 1 tablet (100 mcg total) by mouth before breakfast.    losartan (COZAAR) 100 MG tablet TAKE ONE TABLET BY MOUTH ONCE DAILY    magnesium oxide (MAG-OX) 400 mg tablet TAKE ONE TABLET BY MOUTH ONCE DAILY    montelukast (SINGULAIR) 10 mg tablet Take 1 tablet (10 mg total) by mouth every evening.    sertraline (ZOLOFT) 25 MG tablet Take 1 tablet (25 mg total) by mouth once daily.    simvastatin (ZOCOR) 40 MG tablet TAKE ONE TABLET BY MOUTH ONCE DAILY IN THE EVENING    vitamin D 1000 units Tab Take 1 tablet (1,000 Units total) by mouth once daily.    albuterol-ipratropium (DUO-NEB) 2.5 mg-0.5 mg/3 mL nebulizer solution USE ONE VIAL IN NEBULIZER EVERY 6 HOURS WHILE AWAKE    nitroGLYCERIN (NITROSTAT) 0.4 MG SL tablet Place 1 tablet (0.4 mg total) under the tongue every 5 (five) minutes as needed for Chest pain. As needed (Patient taking differently:  Place 0.4 mg under the tongue every 5 (five) minutes as needed for Chest pain. Seek medical help if pain is not relieved after the third dose.)    traMADol (ULTRAM) 50 mg tablet Take 50 mg by mouth every 6 (six) hours as needed for Pain.    [DISCONTINUED] methylPREDNISolone (MEDROL DOSEPACK) 4 mg tablet use as directed     Family History     Problem Relation (Age of Onset)    Diabetes Sister, Brother    Heart disease Mother    Hypertension Mother    Kidney disease Son    Lung disease Father        Tobacco Use    Smoking status: Former Smoker     Packs/day: 0.35     Years: 44.00     Pack years: 15.40     Types: Cigarettes     Last attempt to quit: 4/30/2015     Years since quitting: 3.3    Smokeless tobacco: Never Used    Tobacco comment: 1/08/2015   Substance and Sexual Activity    Alcohol use: No     Alcohol/week: 0.0 oz    Drug use: No    Sexual activity: No     Review of Systems   Constitutional: Positive for activity change, appetite change and fatigue. Negative for diaphoresis and fever.   HENT: Negative for facial swelling and postnasal drip.    Eyes: Negative for photophobia and visual disturbance.   Respiratory: Negative for cough and shortness of breath.    Cardiovascular: Negative for chest pain and leg swelling.   Gastrointestinal: Negative for abdominal distention, abdominal pain and constipation.   Endocrine: Negative for polyphagia and polyuria.   Genitourinary: Negative for flank pain and hematuria.   Musculoskeletal: Positive for gait problem. Negative for back pain and joint swelling.   Skin: Negative for rash.   Neurological: Negative for speech difficulty and numbness.   Psychiatric/Behavioral: Negative for behavioral problems. The patient is not hyperactive.      Objective:     Vital Signs (Most Recent):  Temp: 97.1 °F (36.2 °C) (09/13/18 1538)  Pulse: 63 (09/13/18 1538)  Resp: 20 (09/13/18 1538)  BP: (!) 121/59 (09/13/18 1538)  SpO2: 95 % (09/13/18 1538) Vital Signs (24h Range):  Temp:   [96.6 °F (35.9 °C)-98.3 °F (36.8 °C)] 97.1 °F (36.2 °C)  Pulse:  [59-71] 63  Resp:  [16-20] 20  SpO2:  [92 %-97 %] 95 %  BP: ()/(38-59) 121/59     Weight: 59 kg (130 lb 1.1 oz)  Body mass index is 23.04 kg/m².    Physical Exam   Constitutional: She is oriented to person, place, and time. She appears well-developed and well-nourished.   Generalized weakness   HENT:   Head: Normocephalic and atraumatic.   Right Ear: External ear normal.   Left Ear: External ear normal.   Mouth/Throat: Oropharynx is clear and moist.   Eyes: Conjunctivae and EOM are normal. Pupils are equal, round, and reactive to light.   Neck: Normal range of motion. Neck supple.   Cardiovascular: Normal rate, regular rhythm and intact distal pulses.   Murmur (ASM) heard.  Pulmonary/Chest: Effort normal and breath sounds normal. She has no wheezes.   Abdominal: Soft. Bowel sounds are normal. There is no tenderness.   Musculoskeletal: Normal range of motion.   Neurological: She is alert and oriented to person, place, and time. Coordination normal.   Skin: Skin is warm and dry. No erythema.   Psychiatric: She has a normal mood and affect. Her behavior is normal. Judgment normal.   Nursing note and vitals reviewed.        CRANIAL NERVES     CN III, IV, VI   Pupils are equal, round, and reactive to light.  Extraocular motions are normal.        Significant Labs:   BMP:   Recent Labs   Lab  09/13/18   0957   GLU  144*   NA  136   K  6.2*   CL  103   CO2  22*   BUN  57*   CREATININE  2.5*   CALCIUM  9.3     CBC:   Recent Labs   Lab  09/13/18   0957   WBC  7.60   HGB  8.6*   HCT  26.0*   PLT  196       Significant Imaging: I have reviewed and interpreted all pertinent imaging results/findings within the past 24 hours.   CXR no acute process

## 2018-09-13 NOTE — ASSESSMENT & PLAN NOTE
Initiated on IV normal saline. Decrease to 100 cc/hr  Renal diet  Avoid nephrotoxic agents, hold ARB/ACEI and no NSAIDS.   Renal dose medications.  Trend renal function.  Baseline Cr 1.8

## 2018-09-13 NOTE — HPI
"Blaire Cage is a 88 y.o. female with PMH of hypertension, DM type 2, hyperlipidemia, COPD, hypothyroidism, and pulmonary emboli on anticoauglation presents to the ED for evaluation of hypotension and generalized weakness. She was evaluated by ELLIE Ledbetter this am and was noted to have hypotension (no BP noted in primary note).  According to her daughter, she has been feeling "bad for almost 2 weeks, she doesn't feel like doing anything. She is not eating much and needs help walking."  In the ED she was noted to have a blood pressure of 94/53 and evidence of SHALINI with Cr 2.5. Baseline renal function is 1.8. She denies dizziness, lightheadedness and shortness of breath.     "

## 2018-09-13 NOTE — ED PROVIDER NOTES
Encounter Date: 9/13/2018    SCRIBE #1 NOTE: IYuliana, am scribing for, and in the presence of, Dr. Guadalupe.       History     Chief Complaint   Patient presents with    Hypotension     Sent to ED by PCP for hypotension. States she went to PCP today for help with nursing home placement.        Time seen by provider: 9:46 AM on 09/13/2018    Blaire Cage is a 88 y.o. female with DMII, HTN, HLD and COPD who presents to the ED with an onset of a low BP. She was seen in the [facility] clinic by ELLIE Ledbetter PTA and referred to the ER for admission to evaluate her low BP as well as nursing home placement. Per daughter, the patient was self-sufficient and ambulating with a walker prior to 2 weeks ago. During the past 2 weeks, she has been sleeping more, unable to walk on her own, confused and depressed. The patient/daughter any other symptoms at this time. No pertinent SHx noted. Codeine, Morphine, Carvedilol, Bonvia, Kionex, Lisinopril, dye contrast, Hydralazine analogues drug allergies noted.      The history is provided by the patient and a relative (daughter).     Review of patient's allergies indicates:   Allergen Reactions    Carvedilol Other (See Comments)     Bradycardia and syncope    Boniva [ibandronate]     Codeine     Hydralazine analogues     Iodinated contrast- oral and iv dye Hives    Kionex [sodium polystyrene sulfonate] Diarrhea     From encounter 12/11/17 for diarrhea--not true allergy.    Lisinopril     Morphine      Past Medical History:   Diagnosis Date    Anemia due to multiple mechanisms 6/29/2018    Anemia, chronic disease 6/29/2018    Anemia, chronic renal failure, stage 3 (moderate) 6/29/2018    Anticoagulant long-term use     aspirin    COPD (chronic obstructive pulmonary disease)     COPD with acute exacerbation 1/9/2015    Diabetes mellitus type II     DVT (deep venous thrombosis) 06/09/2018    Encounter for blood transfusion     Heterozygous MTHFR mutation  [id number] 8/7/2018    Hip arthritis 3/1/2016    Homocysteinemia 8/7/2018    Hyperlipidemia     Hypertension     Normocytic normochromic anemia 6/29/2018    PE (pulmonary thromboembolism) 06/09/2018    Pneumonia of right lower lobe due to infectious organism 9/11/2017    Thyroid disease     hypothyroid     Past Surgical History:   Procedure Laterality Date    APPENDECTOMY      ESOPHAGOGASTRODUODENOSCOPY (EGD) N/A 10/25/2017    Performed by Fadi Flores MD at [facility] ENDO    FRACTURE SURGERY      right hip     HERNIA REPAIR      groin    HYSTERECTOMY      VARICOSE VEIN SURGERY       Family History   Problem Relation Age of Onset    Hypertension Mother     Diabetes Sister     Diabetes Brother     Kidney disease Son      Social History     Tobacco Use    Smoking status: Former Smoker     Packs/day: 0.35     Years: 44.00     Pack years: 15.40     Types: Cigarettes     Last attempt to quit: 4/30/2015     Years since quitting: 3.3    Smokeless tobacco: Never Used    Tobacco comment: 1/08/2015   Substance Use Topics    Alcohol use: No     Alcohol/week: 0.0 oz    Drug use: No     Review of Systems   Constitutional: Positive for fatigue.   Psychiatric/Behavioral: Positive for confusion and dysphoric mood.   All other systems reviewed and are negative.    Physical Exam     Initial Vitals [09/13/18 0921]   BP Pulse Resp Temp SpO2   (!) 94/53 (!) 59 20 98.3 °F (36.8 °C) 97 %      MAP       --         Physical Exam    Nursing note and vitals reviewed.  Constitutional: She appears well-developed. No distress.   Elderly and frail appearing. No acute distress.    HENT:   Head: Normocephalic and atraumatic.   Eyes: EOM are normal.   Neck: Normal range of motion. Neck supple.   Cardiovascular: Normal rate, regular rhythm and normal heart sounds. Exam reveals no gallop and no friction rub.    No murmur heard.  Pulmonary/Chest: Breath sounds normal. No respiratory distress. She has no wheezes. She has no rhonchi.  She has no rales.   Abdominal: Soft. She exhibits no distension. There is no tenderness. There is no rebound.   Musculoskeletal: Normal range of motion.   Left foot in boot.    Neurological: She is alert.   Oriented to location and thinks it's 2017.    Skin: Skin is warm and dry.   Psychiatric: She has a normal mood and affect. Her behavior is normal. Judgment and thought content normal.       ED Course   Critical Care  Date/Time: 9/13/2018 1:34 PM  Performed by: Tayo Guadalupe MD  Authorized by: Jacy Whelan MD   Direct patient critical care time: 7 minutes  Additional history critical care time: 6 minutes  Ordering / reviewing critical care time: 9 minutes  Documentation critical care time: 6 minutes  Consulting other physicians critical care time: 5 minutes  Consult with family critical care time: 4 minutes  Total critical care time (exclusive of procedural time) : 37 minutes  Critical care was necessary to treat or prevent imminent or life-threatening deterioration of the following conditions: metabolic crisis (Hyperkalemia).  Critical care was time spent personally by me on the following activities: review of old charts, pulse oximetry, ordering and review of laboratory studies, obtaining history from patient or surrogate, evaluation of patient's response to treatment, examination of patient, ordering and performing treatments and interventions, ordering and review of radiographic studies and re-evaluation of patient's condition.        Labs Reviewed   CBC W/ AUTO DIFFERENTIAL - Abnormal; Notable for the following components:       Result Value    RBC 2.71 (*)     Hemoglobin 8.6 (*)     Hematocrit 26.0 (*)     MCH 31.9 (*)     Lymph% 14.7 (*)     All other components within normal limits   COMPREHENSIVE METABOLIC PANEL - Abnormal; Notable for the following components:    Potassium 6.2 (*)     CO2 22 (*)     Glucose 144 (*)     BUN, Bld 57 (*)     Creatinine 2.5 (*)     Albumin 3.0 (*)     AST 56 (*)      ALT 60 (*)     eGFR if  19 (*)     eGFR if non  17 (*)     All other components within normal limits   URINALYSIS, REFLEX TO URINE CULTURE - Abnormal; Notable for the following components:    Occult Blood UA 3+ (*)     All other components within normal limits    Narrative:     Preferred Collection Type->Urine, Clean Catch   URINALYSIS MICROSCOPIC - Abnormal; Notable for the following components:    RBC, UA 40 (*)     Hyaline Casts, UA 40 (*)     All other components within normal limits    Narrative:     Preferred Collection Type->Urine, Clean Catch   TROPONIN I   TSH     EKG Readings: (Independently Interpreted)   Rhythm: Sinus Bradycardia. Heart Rate: 58. Ectopy: No Ectopy. Conduction: Normal. ST Segments: Normal ST Segments. T Waves: Normal. Clinical Impression: Normal Sinus Rhythm     Imaging Results          X-Ray Chest AP Portable (Final result)  Result time 09/13/18 10:20:25    Final result by Teo Hewitt MD (09/13/18 10:20:25)                 Impression:      No acute process.  Chronic right basilar atelectasis.  Atherosclerosis and cardiomegaly.  No significant change.      Electronically signed by: Teo Hewitt MD  Date:    09/13/2018  Time:    10:20             Narrative:    EXAMINATION:  XR CHEST AP PORTABLE    CLINICAL HISTORY:  fatigue;    TECHNIQUE:  Single frontal view of the chest was performed.    COMPARISON:  08/20/2018    FINDINGS:  The heart is mildly enlarged.  There is calcification of the aorta and cervical carotid arteries.  The lungs are well expanded without consolidation or pleural effusion.  There is a chronic opacity in the right cardiophrenic angle unchanged relative to numerous priors and compatible with chronic atelectasis.  There is eventration of the diaphragm.  Surgical clips are present in the right axilla.                                 Medical Decision Making:   History:   Old Medical Records: I decided to obtain old medical  records.  Clinical Tests:   Lab Tests: Ordered and Reviewed  Radiological Study: Ordered and Reviewed  Medical Tests: Reviewed and Ordered            Scribe Attestation:   Scribe #1: I performed the above scribed service and the documentation accurately describes the services I performed. I attest to the accuracy of the note.    I, Dr. Guadalupe, personally performed the services described in this documentation. All medical record entries made by the scribe were at my direction and in my presence.  I have reviewed the chart and agree that the record reflects my personal performance and is accurate and complete.1:34 PM 09/13/2018            ED Course as of Sep 13 1046   Thu Sep 13, 2018   0940 BP: (!) 119/58 [EF]   0940 Pulse: (!) 59 [EF]   0940 SpO2: (!) 94 % [EF]   1017 Hemoglobin: (!) 8.6 [EF]   1035 Troponin I: <0.006 [EF]   1041 Potassium: (!) 6.2 [EF]   1041 BUN, Bld: (!) 57 [EF]   1041 Creatinine: (!) 2.5 [EF]   1044 Dr barajas to admit  [EF]      ED Course User Index  [EF] Tayo Guadalupe MD     Clinical Impression:   The primary encounter diagnosis was Hyperkalemia. Diagnoses of Fatigue and Dehydration were also pertinent to this visit.      Disposition:   Disposition: Admitted       88-year-old presents to the ER for general malaise.  Referred from the clinic.  Found to be in acute kidney injury and hyperkalemia.  Treated for hyperkalemia no EKG changes.  No sign of sepsis.  Patient will be admitted to Hospital Medicine.                 Tayo uGadalupe MD  09/13/18 1335       Tayo Guadalupe MD  09/13/18 1400

## 2018-09-13 NOTE — MEDICAL/APP STUDENT
Subjective:       Patient ID: Blaire Cage is a 88 y.o. female.    Chief Complaint: Hypotension (Sent to ED by PCP for hypotension. States she went to PCP today for help with nursing home placement. )    Blaire Cage is a 88-year-old female who was at an annual checkup today, when she was found to be hypotensive. She was referred to the ED and lab results showed hyperkalemia and SHALINI. Patient states she has not been feeling well for the past couple of weeks, but did not want to come to the hospital.         Review of Systems   Constitutional: Positive for activity change and fatigue. Negative for chills and fever.   HENT: Negative for congestion and sore throat.    Eyes: Negative for photophobia and visual disturbance.   Respiratory: Negative for cough and shortness of breath.    Cardiovascular: Positive for leg swelling. Negative for chest pain and palpitations.        Lower extremities swollen in the evening   Gastrointestinal: Positive for constipation and nausea. Negative for abdominal pain, blood in stool and vomiting.   Genitourinary: Negative for difficulty urinating and dysuria.   Skin: Negative for color change and rash.   Neurological: Positive for weakness. Negative for dizziness, light-headedness and headaches.   Psychiatric/Behavioral: Negative for agitation and confusion.       Objective:      Physical Exam   Constitutional: She is oriented to person, place, and time. She appears well-developed and well-nourished.   HENT:   Head: Normocephalic and atraumatic.   Right Ear: External ear normal.   Left Ear: External ear normal.   Mouth/Throat: Mucous membranes are dry.   Eyes: Conjunctivae and EOM are normal. Pupils are equal, round, and reactive to light.   Neck: Normal range of motion. Neck supple.   Cardiovascular: Normal rate, regular rhythm, normal heart sounds and intact distal pulses. Exam reveals no gallop.   No murmur heard.  Pulmonary/Chest: Effort normal and breath sounds normal. No  respiratory distress. She has no wheezes. She has no rales.   Abdominal: Soft. She exhibits no distension. There is no tenderness.   Musculoskeletal: Normal range of motion. She exhibits no edema.   Generalized weakness   Neurological: She is alert and oriented to person, place, and time.   Skin: Skin is warm and dry.   Psychiatric: She has a normal mood and affect. Her behavior is normal. Judgment and thought content normal.       Assessment:       1. Hyperkalemia    2. Fatigue    3. Dehydration        Plan:

## 2018-09-14 PROBLEM — I50.33 ACUTE ON CHRONIC DIASTOLIC HEART FAILURE: Status: ACTIVE | Noted: 2018-09-14

## 2018-09-14 LAB
ANION GAP SERPL CALC-SCNC: 6 MMOL/L
BASOPHILS # BLD AUTO: 0 K/UL
BASOPHILS NFR BLD: 0.1 %
BUN SERPL-MCNC: 46 MG/DL
CALCIUM SERPL-MCNC: 8.1 MG/DL
CHLORIDE SERPL-SCNC: 108 MMOL/L
CO2 SERPL-SCNC: 27 MMOL/L
CREAT SERPL-MCNC: 1.8 MG/DL
DIFFERENTIAL METHOD: ABNORMAL
EOSINOPHIL # BLD AUTO: 0.3 K/UL
EOSINOPHIL NFR BLD: 4.3 %
ERYTHROCYTE [DISTWIDTH] IN BLOOD BY AUTOMATED COUNT: 14.1 %
EST. GFR  (AFRICAN AMERICAN): 29 ML/MIN/1.73 M^2
EST. GFR  (NON AFRICAN AMERICAN): 25 ML/MIN/1.73 M^2
GLUCOSE SERPL-MCNC: 133 MG/DL
HCT VFR BLD AUTO: 23.7 %
HGB BLD-MCNC: 7.9 G/DL
LYMPHOCYTES # BLD AUTO: 1 K/UL
LYMPHOCYTES NFR BLD: 15.1 %
MCH RBC QN AUTO: 31.9 PG
MCHC RBC AUTO-ENTMCNC: 33.3 G/DL
MCV RBC AUTO: 96 FL
MONOCYTES # BLD AUTO: 0.8 K/UL
MONOCYTES NFR BLD: 12 %
NEUTROPHILS # BLD AUTO: 4.5 K/UL
NEUTROPHILS NFR BLD: 68.5 %
PLATELET # BLD AUTO: 167 K/UL
PMV BLD AUTO: 9.1 FL
POCT GLUCOSE: 141 MG/DL (ref 70–110)
POCT GLUCOSE: 152 MG/DL (ref 70–110)
POCT GLUCOSE: 208 MG/DL (ref 70–110)
POTASSIUM SERPL-SCNC: 5 MMOL/L
RBC # BLD AUTO: 2.47 M/UL
SODIUM SERPL-SCNC: 141 MMOL/L
WBC # BLD AUTO: 6.5 K/UL

## 2018-09-14 PROCEDURE — 97162 PT EVAL MOD COMPLEX 30 MIN: CPT

## 2018-09-14 PROCEDURE — 27000221 HC OXYGEN, UP TO 24 HOURS

## 2018-09-14 PROCEDURE — 25000003 PHARM REV CODE 250: Performed by: NURSE PRACTITIONER

## 2018-09-14 PROCEDURE — 12000002 HC ACUTE/MED SURGE SEMI-PRIVATE ROOM

## 2018-09-14 PROCEDURE — 80048 BASIC METABOLIC PNL TOTAL CA: CPT

## 2018-09-14 PROCEDURE — 25000242 PHARM REV CODE 250 ALT 637 W/ HCPCS: Performed by: NURSE PRACTITIONER

## 2018-09-14 PROCEDURE — 94761 N-INVAS EAR/PLS OXIMETRY MLT: CPT

## 2018-09-14 PROCEDURE — 97116 GAIT TRAINING THERAPY: CPT

## 2018-09-14 PROCEDURE — 97802 MEDICAL NUTRITION INDIV IN: CPT

## 2018-09-14 PROCEDURE — 63600175 PHARM REV CODE 636 W HCPCS: Performed by: NURSE PRACTITIONER

## 2018-09-14 PROCEDURE — 97530 THERAPEUTIC ACTIVITIES: CPT

## 2018-09-14 PROCEDURE — 36415 COLL VENOUS BLD VENIPUNCTURE: CPT

## 2018-09-14 PROCEDURE — 94640 AIRWAY INHALATION TREATMENT: CPT

## 2018-09-14 PROCEDURE — G8979 MOBILITY GOAL STATUS: HCPCS | Mod: CJ

## 2018-09-14 PROCEDURE — G8978 MOBILITY CURRENT STATUS: HCPCS | Mod: CK

## 2018-09-14 PROCEDURE — 85025 COMPLETE CBC W/AUTO DIFF WBC: CPT

## 2018-09-14 PROCEDURE — 25000003 PHARM REV CODE 250: Performed by: EMERGENCY MEDICINE

## 2018-09-14 RX ORDER — FUROSEMIDE 40 MG/1
40 TABLET ORAL DAILY
Status: DISCONTINUED | OUTPATIENT
Start: 2018-09-14 | End: 2018-09-15

## 2018-09-14 RX ORDER — SODIUM FERRIC GLUCONATE COMPLEX IN SUCROSE 12.5 MG/ML
125 INJECTION INTRAVENOUS ONCE
Status: DISCONTINUED | OUTPATIENT
Start: 2018-09-14 | End: 2018-09-14 | Stop reason: SDUPTHER

## 2018-09-14 RX ADMIN — GABAPENTIN 300 MG: 300 CAPSULE ORAL at 08:09

## 2018-09-14 RX ADMIN — SERTRALINE 25 MG: 25 TABLET, FILM COATED ORAL at 08:09

## 2018-09-14 RX ADMIN — SODIUM CHLORIDE: 0.9 INJECTION, SOLUTION INTRAVENOUS at 04:09

## 2018-09-14 RX ADMIN — APIXABAN 2.5 MG: 2.5 TABLET, FILM COATED ORAL at 08:09

## 2018-09-14 RX ADMIN — IPRATROPIUM BROMIDE AND ALBUTEROL SULFATE 3 ML: .5; 3 SOLUTION RESPIRATORY (INHALATION) at 07:09

## 2018-09-14 RX ADMIN — LEVOTHYROXINE SODIUM 100 MCG: 100 TABLET ORAL at 06:09

## 2018-09-14 RX ADMIN — MONTELUKAST SODIUM 10 MG: 10 TABLET, COATED ORAL at 08:09

## 2018-09-14 RX ADMIN — INSULIN ASPART 1 UNITS: 100 INJECTION, SOLUTION INTRAVENOUS; SUBCUTANEOUS at 09:09

## 2018-09-14 RX ADMIN — SIMVASTATIN 40 MG: 40 TABLET, FILM COATED ORAL at 08:09

## 2018-09-14 RX ADMIN — FUROSEMIDE 40 MG: 40 TABLET ORAL at 11:09

## 2018-09-14 RX ADMIN — SODIUM CHLORIDE 125 MG: 9 INJECTION, SOLUTION INTRAVENOUS at 11:09

## 2018-09-14 RX ADMIN — DOCUSATE SODIUM AND SENNOSIDES 1 TABLET: 50; 8.6 TABLET, FILM COATED ORAL at 11:09

## 2018-09-14 RX ADMIN — ASPIRIN 81 MG: 81 TABLET, COATED ORAL at 08:09

## 2018-09-14 NOTE — PLAN OF CARE
"Patient states she lives alone.  Patient has 6 living children, and 3 of her daughters live nearby.  Patient states that although she lives alone, she is never alone.  Patient states, "between my daughters, my brothers, and my cousins, I am never alone".  Patient states, "If I ever fall they will find me right away, because they are up my butt", patient began laughing.  Patient states that she will return to her home upon discharge.  Independent with ADL's; drives.  Denies HH.  Discharge plan is home; possibly with HH.       09/14/18 1214   Discharge Assessment   Assessment Type Discharge Planning Assessment   Confirmed/corrected address and phone number on facesheet? Yes   Assessment information obtained from? Patient   Prior to hospitilization cognitive status: Alert/Oriented   Prior to hospitalization functional status: Independent;Assistive Equipment   Current cognitive status: Alert/Oriented   Current Functional Status: Independent;Assistive Equipment   Lives With alone   Able to Return to Prior Arrangements yes   Is patient able to care for self after discharge? Yes   Patient's perception of discharge disposition home or selfcare   Readmission Within The Last 30 Days no previous admission in last 30 days   Patient currently being followed by outpatient case management? No   Patient currently receives any other outside agency services? No   Equipment Currently Used at Home walker, rolling;glucometer;nebulizer   Do you have any problems affording any of your prescribed medications? No   Is the patient taking medications as prescribed? yes   Does the patient have transportation home? Yes   Transportation Available family or friend will provide   Dialysis Name and Scheduled days none   Does the patient receive services at the [facility]madin Clinic? No   Discharge Plan A Home   Discharge Plan B Home Health   Patient/Family In Agreement With Plan yes     "

## 2018-09-14 NOTE — ASSESSMENT & PLAN NOTE
Mild Due to IV fluids and holding of home diuretics.   Dc IV fluids and resume lasix po daily for now.

## 2018-09-14 NOTE — PROGRESS NOTES
09/14/18 0721   Patient Assessment/Suction   Level of Consciousness (AVPU) alert   All Lung Fields Breath Sounds clear;diminished   Cough Type none   PRE-TX-O2-ETCO2   O2 Device (Oxygen Therapy) nasal cannula   $ Is the patient on Low Flow Oxygen? Yes   Flow (L/min) 4  (decreased to 2L)   SpO2 100 %   Pulse Oximetry Type Intermittent   $ Pulse Oximetry - Multiple Charge Pulse Oximetry - Multiple   Pulse 72   Resp 16   Aerosol Therapy   $ Aerosol Therapy Charges Aerosol Treatment   Respiratory Treatment Status given   SVN/Inhaler Treatment Route mask   Position During Treatment HOB at 30 degrees   Patient Tolerance good   Post-Treatment   Post-treatment Heart Rate (beats/min) 75   Post-treatment Resp Rate (breaths/min) 16   All Fields Breath Sounds unchanged     Nurse called for PRN. Stated pt was SOB. Upon entering room found pt on NC @ 4L. O2 sats 100%. Decreased to 2L. Pt reluctant to take tx. States she didn't need. Encouraged pt @ nurses request

## 2018-09-14 NOTE — PLAN OF CARE
Problem: Patient Care Overview  Goal: Individualization & Mutuality  Recommendations   Recommendation/Intervention Recommendation:   1) Continue current diet order - renal, add diabetic diet   2) Add Boost Glucose Control BID once potassium levels are WNL  Intervention:   1) RD to monitor wt and intake    Goals 1) Pt will consume >=75% of meals by RD f/u   Nutrition Goal Status new   Communication of RD Recs reviewed with RN

## 2018-09-14 NOTE — PLAN OF CARE
Problem: Physical Therapy Goal  Goal: Physical Therapy Goal  Goals to be met by: 2018     Patient will increase functional independence with mobility by performin. Supine to sit with Contact Guard Assistance  2. Rolling to Left with Contact Guard Assistance.  3. Sit to stand transfer with Contact Guard Assistance  4. Gait  x 150 feet with Contact Guard Assistance using Rolling Walker.   5. Lower extremity exercise program x20 reps per handout, with supervision    Outcome: Ongoing (interventions implemented as appropriate)  PT eval and treat completed, pt performed supine to sit, sit to stand with RW, performed toilet transfer with RW, pt ambulated to hallway with RW, pt performed stand to sit in a chair, pt performed seated LE exercises.

## 2018-09-14 NOTE — ASSESSMENT & PLAN NOTE
Initiated on IV normal saline. Decrease to 100 cc/hr  Renal diet  Avoid nephrotoxic agents, hold ARB/ACEI and no NSAIDS.   Renal dose medications.  Trend renal function.  Baseline Cr 1.8  Improved.

## 2018-09-14 NOTE — PROGRESS NOTES
"[facility] Hospital Medicine  Progress Note    Patient Name: Blaire Cage  MRN: [id number]  Patient Class: IP- Inpatient   Admission Date: 9/13/2018  Length of Stay: 1 days  Attending Physician: Jacy Whelan MD  Primary Care Provider: Eli Edmonsd MD        Subjective:     Principal Problem:SHALINI (acute kidney injury)    HPI:  Blaire Cage is a 88 y.o. female with PMH of hypertension, DM type 2, hyperlipidemia, COPD, hypothyroidism, and pulmonary emboli on anticoauglation presents to the ED for evaluation of hypotension and generalized weakness. She was evaluated by ELLIE Ledbetter this am and was noted to have hypotension (no BP noted in primary note).  According to her daughter, she has been feeling "bad for almost 2 weeks, she doesn't feel like doing anything. She is not eating much and needs help walking."  In the ED she was noted to have a blood pressure of 94/53 and evidence of SHALINI with Cr 2.5. Baseline renal function is 1.8. She denies dizziness, lightheadedness and shortness of breath.       Hospital Course:  No notes on file    Interval History: patient reports mild dyspnea with exertion. Required oxygen when returning from [place]. Feeling better today.       Review of Systems   Constitutional: Positive for activity change and fatigue. Negative for diaphoresis and fever.        Generalized weakness   Respiratory: Negative for cough and shortness of breath.    Cardiovascular: Negative for chest pain and leg swelling.   Gastrointestinal: Negative for abdominal distention and abdominal pain.   Neurological: Negative for speech difficulty and numbness.     Objective:     Vital Signs (Most Recent):  Temp: 98.6 °F (37 °C) (09/14/18 1118)  Pulse: 73 (09/14/18 1118)  Resp: 20 (09/14/18 1118)  BP: (!) 95/46 (09/14/18 1118)  SpO2: (!) 94 % (09/14/18 1118) Vital Signs (24h Range):  Temp:  [96.6 °F (35.9 °C)-100.1 °F (37.8 °C)] 98.6 °F (37 °C)  Pulse:  [60-87] 73  Resp:  [16-21] 20  SpO2: "  [92 %-100 %] 94 %  BP: ()/(46-64) 95/46     Weight: 59 kg (130 lb 1.1 oz)  Body mass index is 23.04 kg/m².    Intake/Output Summary (Last 24 hours) at 9/14/2018 1137  Last data filed at 9/14/2018 0600  Gross per 24 hour   Intake 2640.42 ml   Output 1400 ml   Net 1240.42 ml      Physical Exam   Constitutional: She appears well-developed and well-nourished.   HENT:   Head: Normocephalic and atraumatic.   Mouth/Throat: Oropharynx is clear and moist.   Eyes: Conjunctivae and EOM are normal. Pupils are equal, round, and reactive to light.   Neck: Normal range of motion. Neck supple.   Cardiovascular: Normal rate, regular rhythm and intact distal pulses.   Murmur heard.  ASM   Pulmonary/Chest: Effort normal + few bibasilar crackles. She has no wheezes.   Abdominal: Soft. Bowel sounds are normal. There is no tenderness.   Musculoskeletal: Normal range of motion.   Neurological: She is alert. Coordination normal.   Oriented to self and place. Intermittent confusion during conversation   Skin: Skin is warm and dry.   Psychiatric: She has a normal mood and affect. Her behavior is normal. Judgment normal.   Nursing note and vitals reviewed.      Significant Labs:   CBC:   Recent Labs   Lab  09/13/18   0957  09/14/18   0447   WBC  7.60  6.50   HGB  8.6*  7.9*   HCT  26.0*  23.7*   PLT  196  167     CMP:   Recent Labs   Lab  09/13/18   0957  09/14/18   0447   NA  136  141   K  6.2*  5.0   CL  103  108   CO2  22*  27   GLU  144*  133*   BUN  57*  46*   CREATININE  2.5*  1.8*   CALCIUM  9.3  8.1*   PROT  6.5   --    ALBUMIN  3.0*   --    BILITOT  0.6   --    ALKPHOS  65   --    AST  56*   --    ALT  60*   --    ANIONGAP  11  6*   EGFRNONAA  17*  25*       Significant Imaging: I have reviewed and interpreted all pertinent imaging results/findings within the past 24 hours.    Assessment/Plan:      * SHALINI (acute kidney injury)    Initiated on IV normal saline. Decrease to 100 cc/hr  Renal diet  Avoid nephrotoxic agents, hold  ARB/ACEI and no NSAIDS.   Renal dose medications.  Trend renal function.  Baseline Cr 1.8  Improved.           Hyperkalemia    Given insulin and bicarb in ED  Kayexalate x 1 dose  Renal diet  Trend K  improved        Acute on chronic diastolic heart failure    Mild Due to IV fluids and holding of home diuretics.   Dc IV fluids and resume lasix po daily for now.             Anemia, chronic renal failure, stage 3 (moderate)    Trend H/H  Transfuse for hgb <7  ferrlecit IV x 1 dose. Add fergon            PE (pulmonary thromboembolism)    Resume home oral anticoagulation.           Controlled type 2 diabetes mellitus, without long-term current use of insulin    Diet controlled.  accuchecks AC and HS. Insulin correction scale          COPD (chronic obstructive pulmonary disease)    Resume home bronchodilator           Type 2 diabetes mellitus with stage 3 chronic kidney disease    Baseline Cr 1.8          Hyperlipidemia associated with type 2 diabetes mellitus    Chronic resume statin          Hypertension associated with diabetes    Chronic. Resume home antihypertensives. Hold ARB/ ACE          Hypothyroidism    Chronic. Resume home levothyroxine            VTE Risk Mitigation (From admission, onward)        Ordered     apixaban tablet 2.5 mg  2 times daily      09/13/18 1601        encouraged participation with PT      Sadie Saeed NP  Department of Hospital Medicine   [facility]

## 2018-09-14 NOTE — SUBJECTIVE & OBJECTIVE
Interval History: patient reports mild dyspnea with exertion. Required oxygen when returning from . Feeling better today.       Review of Systems   Constitutional: Positive for activity change and fatigue. Negative for diaphoresis and fever.        Generalized weakness   Respiratory: Negative for cough and shortness of breath.    Cardiovascular: Negative for chest pain and leg swelling.   Gastrointestinal: Negative for abdominal distention and abdominal pain.   Neurological: Negative for speech difficulty and numbness.     Objective:     Vital Signs (Most Recent):  Temp: 98.6 °F (37 °C) (09/14/18 1118)  Pulse: 73 (09/14/18 1118)  Resp: 20 (09/14/18 1118)  BP: (!) 95/46 (09/14/18 1118)  SpO2: (!) 94 % (09/14/18 1118) Vital Signs (24h Range):  Temp:  [96.6 °F (35.9 °C)-100.1 °F (37.8 °C)] 98.6 °F (37 °C)  Pulse:  [60-87] 73  Resp:  [16-21] 20  SpO2:  [92 %-100 %] 94 %  BP: ()/(46-64) 95/46     Weight: 59 kg (130 lb 1.1 oz)  Body mass index is 23.04 kg/m².    Intake/Output Summary (Last 24 hours) at 9/14/2018 1137  Last data filed at 9/14/2018 0600  Gross per 24 hour   Intake 2640.42 ml   Output 1400 ml   Net 1240.42 ml      Physical Exam   Constitutional: She appears well-developed and well-nourished.   HENT:   Head: Normocephalic and atraumatic.   Mouth/Throat: Oropharynx is clear and moist.   Eyes: Conjunctivae and EOM are normal. Pupils are equal, round, and reactive to light.   Neck: Normal range of motion. Neck supple.   Cardiovascular: Normal rate, regular rhythm and intact distal pulses.   Murmur heard.  ASM   Pulmonary/Chest: Effort normal and breath sounds normal. She has no wheezes.   Abdominal: Soft. Bowel sounds are normal. There is no tenderness.   Musculoskeletal: Normal range of motion.   Neurological: She is alert. Coordination normal.   Oriented to self and place. Intermittent confusion during conversation   Skin: Skin is warm and dry.   Psychiatric: She has a normal mood and affect. Her  behavior is normal. Judgment normal.   Nursing note and vitals reviewed.      Significant Labs:   CBC:   Recent Labs   Lab  09/13/18   0957  09/14/18   0447   WBC  7.60  6.50   HGB  8.6*  7.9*   HCT  26.0*  23.7*   PLT  196  167     CMP:   Recent Labs   Lab  09/13/18   0957  09/14/18   0447   NA  136  141   K  6.2*  5.0   CL  103  108   CO2  22*  27   GLU  144*  133*   BUN  57*  46*   CREATININE  2.5*  1.8*   CALCIUM  9.3  8.1*   PROT  6.5   --    ALBUMIN  3.0*   --    BILITOT  0.6   --    ALKPHOS  65   --    AST  56*   --    ALT  60*   --    ANIONGAP  11  6*   EGFRNONAA  17*  25*       Significant Imaging: I have reviewed and interpreted all pertinent imaging results/findings within the past 24 hours.

## 2018-09-14 NOTE — PT/OT/SLP EVAL
"Physical Therapy Evaluation    Patient Name:  Blaire Cage   MRN:  [id number]    Recommendations:     Discharge Recommendations:  home health PT   Discharge Equipment Recommendations: none   Barriers to discharge: None    Assessment:     Blaire Cage is a 88 y.o. female admitted with a medical diagnosis of SHALINI (acute kidney injury).  She presents with the following impairments/functional limitations:  weakness, impaired functional mobilty, impaired balance, decreased safety awareness, impaired cardiopulmonary response to activity, impaired endurance, impaired self care skills, gait instability, decreased lower extremity function, pain, decreased ROM. Pt is talkative, and in good spirits. Pt exhibits impulsivity, requires VC for safety. Pt mobilized to hallway with RW, distance limited by Left heel gout pain. Pt presents with slight confusion.     Rehab Prognosis:  fair; patient would benefit from acute skilled PT services to address these deficits and reach maximum level of function.      Recent Surgery: * No surgery found *      Plan:     During this hospitalization, patient to be seen 6 x/week to address the above listed problems via gait training, therapeutic activities, therapeutic exercises  · Plan of Care Expires:  09/28/18   Plan of Care Reviewed with: patient    Subjective     Communicated with nurse Alvarado prior to session.  Patient found supine upon PT entry to room, agreeable to evaluation. Pt indicated her mouth was dry. Pt's granddaughter present during treatment, indicated the pt has a large family in [place]. Pt indicated her podiatrist had given her a walking boot for her gout pain, but did not wear it because it is "too heavy".     Chief Complaint: pain  Patient comments/goals: go home  Pain/Comfort:  · Pain Rating 1: 8/10  · Location - Side 1: Left  · Location - Orientation 1: generalized  · Location 1: heel  · Pain Addressed 1: Reposition, Distraction    Patients cultural, spiritual, " Scientologist conflicts given the current situation:      Living Environment:  Pt lives home along with no steps, has large family support  Prior to admission, patients level of function was ambulatory within household.  Patient has the following equipment: walker, rolling.  DME owned (not currently used): none.  Upon discharge, patient will have assistance from family.    Objective:     Patient found with: peripheral IV, oxygen, telemetry     General Precautions: Standard, fall   Orthopedic Precautions:N/A   Braces: N/A     Exams:  · Postural Exam:  Patient presented with the following abnormalities:    · -       Rounded shoulders  · -       Forward head  · RLE ROM: WFL  · RLE Strength: 4/5  · LLE ROM: WFL  · LLE Strength: 4/5    Functional Mobility:  · Bed Mobility:     · Rolling Left:  minimum assistance  · Scooting: minimum assistance  · Supine to Sit: minimum assistance  · Transfers:     · Sit to Stand:  minimum assistance with rolling walker  · Toilet Transfer: minimum assistance with rolling walker  · Gait: 40 ft with RW, portable oxygen, distance limited by gout pain    AM-PAC 6 CLICK MOBILITY  Total Score:17       Therapeutic Activities and Exercises:   pt performed seated AP, LAQ, marches  Pt performed toilet transfer with RW, pt voided  Pt SAT levels at 85% without O2, 95% with O2, required VC to breathe through nose and out through mouth   Pt encouraged to perform exercises throughout the day    Patient left up in chair with all lines intact, call button in reach and nurse Jenny notified.    GOALS:   Multidisciplinary Problems     Physical Therapy Goals        Problem: Physical Therapy Goal    Goal Priority Disciplines Outcome Goal Variances Interventions   Physical Therapy Goal     PT, PT/OT Ongoing (interventions implemented as appropriate)     Description:  Goals to be met by: 2018     Patient will increase functional independence with mobility by performin. Supine to sit with Contact Guard  Assistance  2. Rolling to Left with Contact Guard Assistance.  3. Sit to stand transfer with Contact Guard Assistance  4. Gait  x 150 feet with Contact Guard Assistance using Rolling Walker.   5. Lower extremity exercise program x20 reps per handout, with supervision                      History:     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 8/7/2018    Hip arthritis 3/1/2016    Homocysteinemia 8/7/2018    Hyperlipidemia     Hypertension     Normocytic normochromic anemia 6/29/2018    PE (pulmonary thromboembolism) 06/09/2018    Pneumonia of right lower lobe due to infectious organism 9/11/2017    Thyroid disease     hypothyroid       Past Surgical History:   Procedure Laterality Date    APPENDECTOMY      ESOPHAGOGASTRODUODENOSCOPY (EGD) N/A 10/25/2017    Performed by Fadi Flores [facility] at [facility]    FRACTURE SURGERY      right hip     HERNIA REPAIR      groin    HYSTERECTOMY      VARICOSE VEIN SURGERY         Clinical Decision Making:     History  Co-morbidities and personal factors that may impact the plan of care Examination  Body Structures and Functions, activity limitations and participation restrictions that may impact the plan of care Clinical Presentation   Decision Making/ Complexity Score   Co-morbidities:   [] Time since onset of injury / illness / exacerbation  [] Status of current condition  []Patient's cognitive status and safety concerns    [] Multiple Medical Problems (see med hx)  Personal Factors:   [] Patient's age  [] Prior Level of function   []  Patient's home situation (environment and family support)  [] Patient's level of motivation  [] Expected progression of patient      HISTORY:(criteria)    [] 31820 - no personal factors/history    [] 47498 - has 1-2 personal factor/comorbidity     [] 81737 - has >3 personal factor/comorbidity     Body Regions:  [] Objective examination findings  [] Head     []  Neck  [] Trunk   [] Upper Extremity  [] Lower Extremity    Body Systems:  [] For communication ability, affect, cognition, language, and learning style: the assessment of the ability to make needs known, consciousness, orientation (person, place, and time), expected emotional /behavioral responses, and learning preferences (eg, learning barriers, education  needs)  [] For the neuromuscular system: a general assessment of gross coordinated movement (eg, balance, gait, locomotion, transfers, and transitions) and motor function  (motor control and motor learning)  [] For the musculoskeletal system: the assessment of gross symmetry, gross range of motion, gross strength, height, and weight  [] For the integumentary system: the assessment of pliability(texture), presence of scar formation, skin color, and skin integrity  [] For cardiovascular/pulmonary system: the assessment of heart rate, respiratory rate, blood pressure, and edema     Activity limitations:    [] Patient's cognitive status and saf ety concerns          [] Status of current condition      [] Weight bearing restriction  [] Cardiopulmunary Restriction    Participation Restrictions:   [] Goals and goal agreement with the patient     [] Rehab potential (prognosis) and probable outcome      Examination of Body System: (criteria)    [] 21626 - addressing 1-2 elements    [] 10364 - addressing a total of 3 or more elements     [] 19544 -  Addressing a total of 4 or more elements         Clinical Presentation: (criteria)  Choose one     On examination of body system using standardized tests and measures  patient presents with (CHOOSE ONE) elements from any of the following: body structures and functions, activity limitations, and/or participation restrictions.  Leading to a clinical presentation that is considered (CHOOSE ONE)                              Clinical Decision Making  (Eval Complexity):  Choose One     Time Tracking:     PT Received On: 09/14/18  PT Start Time: 1025     PT Stop Time: 1103  PT Total Time (min): 38 min     Billable Minutes: Evaluation 12, Gait Training 12 and Therapeutic Activity 14      Princess Ricardo, PT  09/14/2018

## 2018-09-14 NOTE — PLAN OF CARE
Problem: Patient Care Overview  Goal: Plan of Care Review  Outcome: Ongoing (interventions implemented as appropriate)  Alert and oriented. Family member at bedside. Fall and injury free. Blood sugar checked.   No sliding scale given. Plan of care reviewed with patient. Understanding voiced.  Bed low and locked. Side rails up x 2. Call bell in reach. Telemetry.

## 2018-09-14 NOTE — PROGRESS NOTES
Recommendations   Recommendation/Intervention Recommendation:   1) Continue current diet order - renal, add diabetic diet   2) Add Boost Glucose Control BID once potassium levels are WNL  Intervention:   1) RD to monitor wt and intake    Goals 1) Pt will consume >=75% of meals by RD f/u   Nutrition Goal Status new   Communication of RD Recs reviewed with RN            09/14/18 9310   Reason for Assessment   Reason for Assessment consult   Diagnosis Hyperkalemia, Fatigue, Dehydation   Relevant Medical History 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 Pt reports good appetite, PO intake of 50-75% of meals. C/o constipation. Pt reports that she is following a low potassium diet at home, good appetite PTA. Reports UBW of 136 lb, CW is 130 lb. Wt loss of 6lb (4.4%) over the last six weeks. NFPE reveals moderate fat loss of orbitals and triceps and moderate muscle loss of hands. Recommend pt try Boost Gluose Control. Reviewed with nurse, to monitor potassium levels.    Nutrition Discharge Planning D/c on renal diet    Nutrition Risk Screen   Nutrition Risk Screen no indicators present  "  Nutrition/Diet History   Patient Reported Diet/Restrictions/  Preferences renal   Food Allergies NKFA   Factors Affecting Nutritional Intake None identified at this time   Anthropometrics   Temp 98.6 °F (37 °C)   Height Method Stated   Height 5' 3" (1.6 m)   Height (inches) 63 in   Weight Method Standard Scale   Weight 59 kg (130 lb 1.1 oz)   Weight (lb) 130.07 lb   Ideal Body Weight (IBW), Female 115 lb   % Ideal Body Weight, Female (lb) 113.1 lb   BMI (Calculated) 23.1   BMI Grade 18.5-24.9 - normal   Weight Loss unintentional   Usual Body Weight (UBW), kg 61.8 kg  (Per chart review, office visit 8/2/18)   % Usual Body Weight 95.67   % Weight Change From Usual Weight -4.53 %   Labs/Tests/Procedures/Meds   Pertinent Labs Reviewed reviewed   Pertinent Labs Comments Lab Results   Component Value Date    LABPROT 10.0 09/11/2017    ALBUMIN 3.0 (L) 09/13/2018     Lab Results   Component Value Date    WBC 6.50 09/14/2018     Recent Labs   Lab  09/14/18   1151   POCTGLUCOSE  141*     Lab Results   Component Value Date     09/14/2018    K 5.0 09/14/2018     09/14/2018    CO2 27 09/14/2018     Lab Results   Component Value Date    HGBA1C 6.8 (H) 06/27/2018     Lab Results   Component Value Date    BUN 46 (H) 09/14/2018      Pertinent Medications Reviewed reviewed   Pertinent Medications Comments   Current Outpatient Medications on File Prior to Encounter   Medication Sig Dispense Refill    ascorbic acid (VITAMIN C) 500 MG tablet Take 500 mg by mouth once daily.        aspirin (ECOTRIN) 81 MG EC tablet Take 81 mg by mouth once daily.        ferrous sulfate 325 mg (65 mg iron) Tab tablet Take 1 tablet (325 mg total) by mouth 2 (two) times daily. 180 tablet 3    folic acid-vit B6-vit B12 2.5-25-2 mg (FOLBIC OR EQUIV) 2.5-25-2 mg Tab Take 1 tablet by mouth once daily. 30 tablet 6    furosemide (LASIX) 40 MG tablet Take 40 mg by mouth 2 (two) times daily.      magnesium oxide (MAG-OX) 400 mg tablet TAKE ONE " TABLET BY MOUTH ONCE DAILY 90 tablet 3    simvastatin (ZOCOR) 40 MG tablet TAKE ONE TABLET BY MOUTH ONCE DAILY IN THE EVENING 90 tablet 0    vitamin D 1000 units Tab Take 1 tablet (1,000 Units total) by mouth once daily. 90 tablet 3      Physical Findings/Assessment   Overall Physical Appearance advanced age;loss of muscle mass;loss of subcutaneous fat   Oral/Mouth Cavity dental applicance present (specify);tooth/teeth missing   Skin intact   Estimated/Assessed Needs   Weight Used For Calorie Calculations 59 kg (130 lb 1.1 oz)   Energy Calorie Requirements (kcal) 1770 - 1888, 30-32 g/kg   RMR ([place]-St. Jeor Equation) 989.12   Protein Requirements 47-70 g, 0.8-1.2 g/kg   Weight Used For Protein Calculations 59 kg (130 lb 1.1 oz)   1.0 gm Protein (gm) 59.0   1.2 gm Protein (gm) 70.95   Fluid Requirements (mL) 500 mL + total OUP   Nutrition Prescription Ordered   Current Diet Order (Renal)   Evaluation Of Received Nutrient/Fluid Intake   Energy Calories Required not meeting needs   Protein Required not meeting needs   Tolerance tolerating   Nutrition Risk   Level of Risk/Frequency of Follow-up f/u 1x/week    Monitor and Evaluation   Food and Nutrient Intake energy intake   Food and Nutrient Adminstration diet order   Anthropometric Measurements height/length;weight   Biochemical Data, Medical Tests and Procedures electrolyte and renal panel;glucose/endocrine profile   Nutrition-Focused Physical Findings extremities, muscles and bones;head and eyes;skin   Nutrition Follow-up   RD Follow-up? Yes   Next Date to be Seen by RD 09/17/18       Contributing Nutrition Diagnosis  Excessive mineral intake (potassium)    Related to (etiology):   Food- and nutrition-related knowledge deficit concerning food and supplemental sources of minerals    Signs and Symptoms (as evidenced by):   K+ laboratory value 6.2 (9/13/18)     Interventions/Recommendations (treatment strategy):   1) Continue current diet order -  renal      Nutrition Diagnosis Status:   New    Contributing Nutrition Diagnosis  Inadequate oral intake     Related to (etiology):   Increased needs due to COPD     Signs and Symptoms (as evidenced by):   Weight loss    Interventions/Recommendations (treatment strategy):  1) Add Boost Glucose Control BID once potassium levels are WNL    Nutrition Diagnosis Status:   Rafiq Dooley, Dietetic intern    I certify that I directed the dietetic intern in service delivery and guided them using my skilled judgment. As the cosigning dietitian, I have reviewed the dietetic interns documentation and am responsible for the treatment, assessment, and plan.  Fabby Davenport, MS RD LD

## 2018-09-14 NOTE — PROGRESS NOTES
Pt walked to bathroom without o2. Complain of shortness of breath after returning to bed. Oxygen sats 90.  Resp called to give prn treatment. O2 oncreased to 4L NC at this time. Awaiting resp therapist. Will continue to monitor.

## 2018-09-14 NOTE — PLAN OF CARE
09/13/18 2003   Patient Assessment/Suction   Level of Consciousness (AVPU) alert   Respiratory Effort Normal;Unlabored   Expansion/Accessory Muscles/Retractions expansion symmetric;no retractions;no use of accessory muscles   All Lung Fields Breath Sounds clear;diminished   PRE-TX-O2-ETCO2   O2 Device (Oxygen Therapy) room air   SpO2 96 %   Pulse Oximetry Type Intermittent   Pulse 67   Resp 16   Aerosol Therapy   $ Aerosol Therapy Charges PRN treatment not required   Respiratory Treatment Status PRN treatment not required

## 2018-09-14 NOTE — PLAN OF CARE
1000  Student NP at bedside; will return at a later time.       09/14/18 1122   Discharge Assessment   Assessment Type Discharge Planning Assessment

## 2018-09-15 LAB
ANION GAP SERPL CALC-SCNC: 7 MMOL/L
BACTERIA #/AREA URNS HPF: ABNORMAL /HPF
BASOPHILS # BLD AUTO: 0 K/UL
BASOPHILS NFR BLD: 0.3 %
BILIRUB UR QL STRIP: NEGATIVE
BUN SERPL-MCNC: 41 MG/DL
CALCIUM SERPL-MCNC: 8.3 MG/DL
CHLORIDE SERPL-SCNC: 108 MMOL/L
CLARITY UR: CLEAR
CO2 SERPL-SCNC: 27 MMOL/L
COLOR UR: YELLOW
CREAT SERPL-MCNC: 1.5 MG/DL
DIFFERENTIAL METHOD: ABNORMAL
EOSINOPHIL # BLD AUTO: 0.2 K/UL
EOSINOPHIL NFR BLD: 3.8 %
ERYTHROCYTE [DISTWIDTH] IN BLOOD BY AUTOMATED COUNT: 13.7 %
EST. GFR  (AFRICAN AMERICAN): 36 ML/MIN/1.73 M^2
EST. GFR  (NON AFRICAN AMERICAN): 31 ML/MIN/1.73 M^2
GLUCOSE SERPL-MCNC: 114 MG/DL
GLUCOSE UR QL STRIP: NEGATIVE
HCT VFR BLD AUTO: 23.8 %
HGB BLD-MCNC: 7.9 G/DL
HGB UR QL STRIP: ABNORMAL
KETONES UR QL STRIP: NEGATIVE
LEUKOCYTE ESTERASE UR QL STRIP: NEGATIVE
LYMPHOCYTES # BLD AUTO: 1.1 K/UL
LYMPHOCYTES NFR BLD: 18.1 %
MCH RBC QN AUTO: 31.6 PG
MCHC RBC AUTO-ENTMCNC: 33.1 G/DL
MCV RBC AUTO: 96 FL
MICROSCOPIC COMMENT: ABNORMAL
MONOCYTES # BLD AUTO: 0.7 K/UL
MONOCYTES NFR BLD: 12.2 %
NEUTROPHILS # BLD AUTO: 4 K/UL
NEUTROPHILS NFR BLD: 65.6 %
NITRITE UR QL STRIP: NEGATIVE
PH UR STRIP: 6 [PH] (ref 5–8)
PLATELET # BLD AUTO: 171 K/UL
PMV BLD AUTO: 9.4 FL
POCT GLUCOSE: 114 MG/DL (ref 70–110)
POCT GLUCOSE: 140 MG/DL (ref 70–110)
POCT GLUCOSE: 217 MG/DL (ref 70–110)
POTASSIUM SERPL-SCNC: 4.3 MMOL/L
PROT UR QL STRIP: NEGATIVE
RBC # BLD AUTO: 2.48 M/UL
RBC #/AREA URNS HPF: 2 /HPF (ref 0–4)
SODIUM SERPL-SCNC: 142 MMOL/L
SP GR UR STRIP: 1.01 (ref 1–1.03)
SQUAMOUS #/AREA URNS HPF: 1 /HPF
URN SPEC COLLECT METH UR: ABNORMAL
UROBILINOGEN UR STRIP-ACNC: NEGATIVE EU/DL
WBC # BLD AUTO: 6.1 K/UL
WBC #/AREA URNS HPF: 3 /HPF (ref 0–5)

## 2018-09-15 PROCEDURE — 83540 ASSAY OF IRON: CPT

## 2018-09-15 PROCEDURE — 25000242 PHARM REV CODE 250 ALT 637 W/ HCPCS: Performed by: HOSPITALIST

## 2018-09-15 PROCEDURE — 94640 AIRWAY INHALATION TREATMENT: CPT

## 2018-09-15 PROCEDURE — 81000 URINALYSIS NONAUTO W/SCOPE: CPT

## 2018-09-15 PROCEDURE — 36415 COLL VENOUS BLD VENIPUNCTURE: CPT

## 2018-09-15 PROCEDURE — 12000002 HC ACUTE/MED SURGE SEMI-PRIVATE ROOM

## 2018-09-15 PROCEDURE — 63600175 PHARM REV CODE 636 W HCPCS: Performed by: NURSE PRACTITIONER

## 2018-09-15 PROCEDURE — 25000003 PHARM REV CODE 250: Performed by: NURSE PRACTITIONER

## 2018-09-15 PROCEDURE — 85025 COMPLETE CBC W/AUTO DIFF WBC: CPT

## 2018-09-15 PROCEDURE — 27000221 HC OXYGEN, UP TO 24 HOURS

## 2018-09-15 PROCEDURE — 94761 N-INVAS EAR/PLS OXIMETRY MLT: CPT

## 2018-09-15 PROCEDURE — 99900035 HC TECH TIME PER 15 MIN (STAT)

## 2018-09-15 PROCEDURE — 25000242 PHARM REV CODE 250 ALT 637 W/ HCPCS: Performed by: NURSE PRACTITIONER

## 2018-09-15 PROCEDURE — 80048 BASIC METABOLIC PNL TOTAL CA: CPT

## 2018-09-15 RX ORDER — IPRATROPIUM BROMIDE AND ALBUTEROL SULFATE 2.5; .5 MG/3ML; MG/3ML
3 SOLUTION RESPIRATORY (INHALATION)
Status: DISCONTINUED | OUTPATIENT
Start: 2018-09-15 | End: 2018-09-15 | Stop reason: SDUPTHER

## 2018-09-15 RX ORDER — AMLODIPINE BESYLATE 5 MG/1
5 TABLET ORAL DAILY
Qty: 30 TABLET | Refills: 0 | Status: SHIPPED | OUTPATIENT
Start: 2018-09-15 | End: 2018-11-26 | Stop reason: SDUPTHER

## 2018-09-15 RX ORDER — IPRATROPIUM BROMIDE AND ALBUTEROL SULFATE 2.5; .5 MG/3ML; MG/3ML
3 SOLUTION RESPIRATORY (INHALATION)
Status: DISCONTINUED | OUTPATIENT
Start: 2018-09-15 | End: 2018-09-17 | Stop reason: HOSPADM

## 2018-09-15 RX ORDER — FUROSEMIDE 10 MG/ML
20 INJECTION INTRAMUSCULAR; INTRAVENOUS ONCE
Status: COMPLETED | OUTPATIENT
Start: 2018-09-15 | End: 2018-09-15

## 2018-09-15 RX ORDER — AMLODIPINE BESYLATE 5 MG/1
5 TABLET ORAL DAILY
Status: DISCONTINUED | OUTPATIENT
Start: 2018-09-16 | End: 2018-09-17 | Stop reason: HOSPADM

## 2018-09-15 RX ORDER — FUROSEMIDE 40 MG/1
40 TABLET ORAL 2 TIMES DAILY
Status: DISCONTINUED | OUTPATIENT
Start: 2018-09-15 | End: 2018-09-17 | Stop reason: HOSPADM

## 2018-09-15 RX ORDER — FLUTICASONE FUROATE AND VILANTEROL 100; 25 UG/1; UG/1
1 POWDER RESPIRATORY (INHALATION) DAILY
Status: DISCONTINUED | OUTPATIENT
Start: 2018-09-15 | End: 2018-09-17 | Stop reason: HOSPADM

## 2018-09-15 RX ADMIN — ASPIRIN 81 MG: 81 TABLET, COATED ORAL at 08:09

## 2018-09-15 RX ADMIN — METHYLPREDNISOLONE SODIUM SUCCINATE 40 MG: 40 INJECTION, POWDER, FOR SOLUTION INTRAMUSCULAR; INTRAVENOUS at 06:09

## 2018-09-15 RX ADMIN — IPRATROPIUM BROMIDE AND ALBUTEROL SULFATE 3 ML: .5; 3 SOLUTION RESPIRATORY (INHALATION) at 08:09

## 2018-09-15 RX ADMIN — FUROSEMIDE 20 MG: 10 INJECTION, SOLUTION INTRAMUSCULAR; INTRAVENOUS at 02:09

## 2018-09-15 RX ADMIN — SERTRALINE 25 MG: 25 TABLET, FILM COATED ORAL at 08:09

## 2018-09-15 RX ADMIN — GABAPENTIN 300 MG: 300 CAPSULE ORAL at 08:09

## 2018-09-15 RX ADMIN — FUROSEMIDE 40 MG: 40 TABLET ORAL at 06:09

## 2018-09-15 RX ADMIN — IPRATROPIUM BROMIDE AND ALBUTEROL SULFATE 3 ML: .5; 3 SOLUTION RESPIRATORY (INHALATION) at 06:09

## 2018-09-15 RX ADMIN — INSULIN ASPART 2 UNITS: 100 INJECTION, SOLUTION INTRAVENOUS; SUBCUTANEOUS at 01:09

## 2018-09-15 RX ADMIN — FUROSEMIDE 40 MG: 40 TABLET ORAL at 08:09

## 2018-09-15 RX ADMIN — SIMVASTATIN 40 MG: 40 TABLET, FILM COATED ORAL at 08:09

## 2018-09-15 RX ADMIN — LEVOTHYROXINE SODIUM 100 MCG: 100 TABLET ORAL at 05:09

## 2018-09-15 RX ADMIN — MONTELUKAST SODIUM 10 MG: 10 TABLET, COATED ORAL at 08:09

## 2018-09-15 RX ADMIN — APIXABAN 2.5 MG: 2.5 TABLET, FILM COATED ORAL at 08:09

## 2018-09-15 NOTE — PLAN OF CARE
Problem: Patient Care Overview  Goal: Plan of Care Review  Outcome: Ongoing (interventions implemented as appropriate)  POC reviewed with Pt and daughter at bedside concerning Pt's breathing, SOB on exertion, wheezing, need for monitoring another night, and need for continued lasix. Explained proper use of and importance of IS, return demonstration performed. Medications reviewed. Fall risk reviewed, safety maintained throughout shift. Bed kept low and locked, SR's up x 2, call light placed within easy reach with frequent reminders and instructions on use for staff assistance.

## 2018-09-15 NOTE — PT/OT/SLP PROGRESS
Physical Therapy      Patient Name:  Blaire Cage   MRN:  [id number]    Patient not seen today secondary to refusal  . Will follow-up 09/16/2018.    Cesilia Montalvo, PTA

## 2018-09-15 NOTE — ASSESSMENT & PLAN NOTE
Resume home bronchodilator   Add breo  Solumedrol 40 mg IV Q 8 hours  Respiratory treatment scheduled  Add IS  Repeat CXR

## 2018-09-15 NOTE — SUBJECTIVE & OBJECTIVE
"Interval History: patient reports persistent dyspnea with exertion "just walking to Bathroom, that's not normal"  Required oxygen when returning from .   Daughter at BS    Review of Systems   Constitutional: Positive for activity change and fatigue. Negative for diaphoresis and fever.        Generalized weakness   Respiratory: Positive for shortness of breath. Negative for cough.    Cardiovascular: Negative for chest pain and leg swelling.   Gastrointestinal: Negative for abdominal distention and abdominal pain.   Neurological: Negative for speech difficulty and numbness.     Objective:     Vital Signs (Most Recent):  Temp: 97.7 °F (36.5 °C) (09/15/18 1615)  Pulse: 78 (09/15/18 1615)  Resp: 18 (09/15/18 1615)  BP: (!) 162/68 (09/15/18 1615)  SpO2: 95 % (09/15/18 1615) Vital Signs (24h Range):  Temp:  [97.7 °F (36.5 °C)-100 °F (37.8 °C)] 97.7 °F (36.5 °C)  Pulse:  [69-81] 78  Resp:  [16-20] 18  SpO2:  [93 %-96 %] 95 %  BP: (109-162)/(56-68) 162/68     Weight: 59 kg (130 lb 1.1 oz)  Body mass index is 23.04 kg/m².  No intake or output data in the 24 hours ending 09/15/18 1633   Physical Exam   Constitutional: She appears well-developed and well-nourished.   HENT:   Head: Normocephalic and atraumatic.   Mouth/Throat: Oropharynx is clear and moist.   Eyes: Conjunctivae and EOM are normal. Pupils are equal, round, and reactive to light.   Neck: Normal range of motion. Neck supple.   Cardiovascular: Normal rate, regular rhythm and intact distal pulses.   Murmur heard.  ASM   Pulmonary/Chest: Effort normal. She has wheezes.   Abdominal: Soft. Bowel sounds are normal. There is no tenderness.   Musculoskeletal: Normal range of motion.   Neurological: She is alert. Coordination normal.   Oriented to self and place. Intermittent confusion during conversation   Skin: Skin is warm and dry.   Psychiatric: She has a normal mood and affect. Her behavior is normal. Judgment normal.   Nursing note and vitals " reviewed.      Significant Labs:   CBC:   Recent Labs   Lab  09/14/18 0447  09/15/18   0437   WBC  6.50  6.10   HGB  7.9*  7.9*   HCT  23.7*  23.8*   PLT  167  171     CMP:   Recent Labs   Lab  09/14/18 0447  09/15/18   0437   NA  141  142   K  5.0  4.3   CL  108  108   CO2  27  27   GLU  133*  114*   BUN  46*  41*   CREATININE  1.8*  1.5*   CALCIUM  8.1*  8.3*   ANIONGAP  6*  7*   EGFRNONAA  25*  31*       Significant Imaging: I have reviewed and interpreted all pertinent imaging results/findings within the past 24 hours.

## 2018-09-15 NOTE — ASSESSMENT & PLAN NOTE
Dangers of cigarette smoking were reviewed with patient in detail for 10 minutes and patient was encouraged to quit. Nicotine replacement options were discussed.   Daughter states continues to smoke.

## 2018-09-15 NOTE — PROGRESS NOTES
"[facility] Medicine  Progress Note    Patient Name: Blaire Cage  MRN: [id number]  Patient Class: IP- Inpatient   Admission Date: 9/13/2018  Length of Stay: 2 days  Attending Physician: Slade Anguiano MD  Primary Care Provider: Eli Edmonds MD        Subjective:     Principal Problem:SHALINI (acute kidney injury)    HPI:  Blaire Cage is a 88 y.o. female with PMH of hypertension, DM type 2, hyperlipidemia, COPD, hypothyroidism, and pulmonary emboli on anticoauglation presents to the ED for evaluation of hypotension and generalized weakness. She was evaluated by ELLIE Ledbetter this am and was noted to have hypotension (no BP noted in primary note).  According to her daughter, she has been feeling "bad for almost 2 weeks, she doesn't feel like doing anything. She is not eating much and needs help walking."  In the ED she was noted to have a blood pressure of 94/53 and evidence of SHALINI with Cr 2.5. Baseline renal function is 1.8. She denies dizziness, lightheadedness and shortness of breath.       Hospital Course:  No notes on file    Interval History: patient reports persistent dyspnea with exertion "just walking to Bathroom, that's not normal"  Required oxygen when returning from .   Daughter at     Review of Systems   Constitutional: Positive for activity change and fatigue. Negative for diaphoresis and fever.        Generalized weakness   Respiratory: Positive for shortness of breath. Negative for cough.    Cardiovascular: Negative for chest pain and leg swelling.   Gastrointestinal: Negative for abdominal distention and abdominal pain.   Neurological: Negative for speech difficulty and numbness.     Objective:     Vital Signs (Most Recent):  Temp: 97.7 °F (36.5 °C) (09/15/18 1615)  Pulse: 78 (09/15/18 1615)  Resp: 18 (09/15/18 1615)  BP: (!) 162/68 (09/15/18 1615)  SpO2: 95 % (09/15/18 1615) Vital Signs (24h Range):  Temp:  [97.7 °F (36.5 °C)-100 °F (37.8 °C)] 97.7 °F " (36.5 °C)  Pulse:  [69-81] 78  Resp:  [16-20] 18  SpO2:  [93 %-96 %] 95 %  BP: (109-162)/(56-68) 162/68     Weight: 59 kg (130 lb 1.1 oz)  Body mass index is 23.04 kg/m².  No intake or output data in the 24 hours ending 09/15/18 1633   Physical Exam   Constitutional: She appears well-developed and well-nourished.   HENT:   Head: Normocephalic and atraumatic.   Mouth/Throat: Oropharynx is clear and moist.   Eyes: Conjunctivae and EOM are normal. Pupils are equal, round, and reactive to light.   Neck: Normal range of motion. Neck supple.   Cardiovascular: Normal rate, regular rhythm and intact distal pulses.   Murmur heard.  ASM   Pulmonary/Chest: Effort normal. She has wheezes.   Abdominal: Soft. Bowel sounds are normal. There is no tenderness.   Musculoskeletal: Normal range of motion.   Neurological: She is alert. Coordination normal.   Oriented to self and place. Intermittent confusion during conversation   Skin: Skin is warm and dry.   Psychiatric: She has a normal mood and affect. Her behavior is normal. Judgment normal.   Nursing note and vitals reviewed.      Significant Labs:   CBC:   Recent Labs   Lab  09/14/18   0447  09/15/18   0437   WBC  6.50  6.10   HGB  7.9*  7.9*   HCT  23.7*  23.8*   PLT  167  171     CMP:   Recent Labs   Lab  09/14/18 0447  09/15/18   0437   NA  141  142   K  5.0  4.3   CL  108  108   CO2  27  27   GLU  133*  114*   BUN  46*  41*   CREATININE  1.8*  1.5*   CALCIUM  8.1*  8.3*   ANIONGAP  6*  7*   EGFRNONAA  25*  31*       Significant Imaging: I have reviewed and interpreted all pertinent imaging results/findings within the past 24 hours.    Assessment/Plan:      * SHALINI (acute kidney injury)    Initiated on IV normal saline. Decrease to 100 cc/hr  Renal diet  Avoid nephrotoxic agents, hold ARB/ACEI and no NSAIDS.   Renal dose medications.  Trend renal function.  Baseline Cr 1.8  Improved.           Hyperkalemia    Given insulin and bicarb in ED  Kayexalate x 1 dose  Renal  diet  Trend K  improved        Acute on chronic diastolic heart failure    Mild Due to IV fluids and holding of home diuretics.   Dc IV fluids and resume lasix po daily for now.             Anemia, chronic renal failure, stage 3 (moderate)    Trend H/H  Transfuse for hgb <7  ferrlecit IV x 1 dose. Add fergon  Check iron studies            PE (pulmonary thromboembolism)    Resume home oral anticoagulation.           Controlled type 2 diabetes mellitus, without long-term current use of insulin    Diet controlled.  accuchecks AC and HS. Insulin correction scale          COPD (chronic obstructive pulmonary disease) with acute bronchitis    Resume home bronchodilator   Add breo  Solumedrol 40 mg IV Q 8 hours  Respiratory treatment scheduled  Add IS  Repeat CXR            Type 2 diabetes mellitus with stage 3 chronic kidney disease    Baseline Cr 1.8          Hyperlipidemia associated with type 2 diabetes mellitus    Chronic resume statin          Hypertension associated with diabetes    Chronic. Resume home antihypertensives. Hold ARB/ ACE          Hypothyroidism    Chronic. Resume home levothyroxine            VTE Risk Mitigation (From admission, onward)        Ordered     apixaban tablet 2.5 mg  2 times daily      09/13/18 1601          Discussed BP meds with Nephrology, Dr. Moulton. Continue Lasix 40 mg BID, decrease Norvasc to 5 mg daily. DC ARB.     I spent 30 minutes of face to face discussion regarding advance directives and end of life planning which included patient and family. Patient/Family understands the seriousness of their condition and would like to make their end of life decisions known as follows- Patient wishes for DNR, however family wishes to talk about living will further. Discussed overal progress with several hospital admissions in the last 4 months.            Sadie Saeed NP  Department of Hospital Medicine   [facility]

## 2018-09-15 NOTE — PLAN OF CARE
"Discharge planner met with patient to discuss home health options. Patient who is alert and oriented decline the need for home health care. When asked if I should speak with her daughters, patient responded " Honey I do not need that I have good children both son and daughters who takes good care of me and see about me."  Updated NP, Katarina,  Left voicemail for patient's daughter Zofia to return call.    1420- Received call back from patient's daughter. Would like for patient to go home with home health. Plan to speak with her other siblings to get an opinion on preferred provider of choice.   "

## 2018-09-15 NOTE — PLAN OF CARE
09/14/18 1920   Patient Assessment/Suction   Level of Consciousness (AVPU) alert   Respiratory Effort Normal;Unlabored   Expansion/Accessory Muscles/Retractions no retractions;no use of accessory muscles   All Lung Fields Breath Sounds clear;equal bilaterally;diminished   Cough Type nonproductive   PRE-TX-O2-ETCO2   O2 Device (Oxygen Therapy) nasal cannula   Flow (L/min) 2   Oxygen Concentration (%) 28   SpO2 95 %   Pulse Oximetry Type Intermittent   Pulse 81   Resp 18   Aerosol Therapy   $ Aerosol Therapy Charges Aerosol Treatment   Respiratory Treatment Status given   SVN/Inhaler Treatment Route mask;with oxygen   Position During Treatment HOB at 45 degrees   Patient Tolerance good   Post-Treatment   Post-treatment Heart Rate (beats/min) 77   Post-treatment Resp Rate (breaths/min) 16   All Fields Breath Sounds unchanged   Ready to Wean/Extubation Screen   FIO2<=50 (chart decimal) 0.28

## 2018-09-15 NOTE — PROGRESS NOTES
09/15/18 0643   Patient Assessment/Suction   Level of Consciousness (AVPU) alert   All Lung Fields Breath Sounds clear   Cough Type none   PRE-TX-O2-ETCO2   O2 Device (Oxygen Therapy) nasal cannula   $ Is the patient on Low Flow Oxygen? Yes   Flow (L/min) 2   SpO2 96 %   Pulse Oximetry Type Intermittent   $ Pulse Oximetry - Multiple Charge Pulse Oximetry - Multiple   Pulse 69   Resp 16   Aerosol Therapy   $ Aerosol Therapy Charges Aerosol Treatment   Respiratory Treatment Status given   SVN/Inhaler Treatment Route mask   Position During Treatment HOB at 30 degrees   Patient Tolerance good   Post-Treatment   Post-treatment Heart Rate (beats/min) 73   Post-treatment Resp Rate (breaths/min) 16   All Fields Breath Sounds clear     Pt requested tx

## 2018-09-16 VITALS
HEART RATE: 60 BPM | RESPIRATION RATE: 20 BRPM | DIASTOLIC BLOOD PRESSURE: 60 MMHG | BODY MASS INDEX: 24.22 KG/M2 | HEIGHT: 63 IN | SYSTOLIC BLOOD PRESSURE: 137 MMHG | OXYGEN SATURATION: 95 % | TEMPERATURE: 98 F | WEIGHT: 136.69 LBS

## 2018-09-16 LAB
ANION GAP SERPL CALC-SCNC: 11 MMOL/L
BASOPHILS # BLD AUTO: 0 K/UL
BASOPHILS NFR BLD: 0.2 %
BUN SERPL-MCNC: 39 MG/DL
CALCIUM SERPL-MCNC: 9.1 MG/DL
CHLORIDE SERPL-SCNC: 103 MMOL/L
CO2 SERPL-SCNC: 28 MMOL/L
CREAT SERPL-MCNC: 1.4 MG/DL
DIFFERENTIAL METHOD: ABNORMAL
EOSINOPHIL # BLD AUTO: 0 K/UL
EOSINOPHIL NFR BLD: 0 %
ERYTHROCYTE [DISTWIDTH] IN BLOOD BY AUTOMATED COUNT: 13.3 %
EST. GFR  (AFRICAN AMERICAN): 39 ML/MIN/1.73 M^2
EST. GFR  (NON AFRICAN AMERICAN): 34 ML/MIN/1.73 M^2
GLUCOSE SERPL-MCNC: 159 MG/DL
HCT VFR BLD AUTO: 27.4 %
HGB BLD-MCNC: 9.2 G/DL
IRON SERPL-MCNC: 19 UG/DL
LYMPHOCYTES # BLD AUTO: 0.7 K/UL
LYMPHOCYTES NFR BLD: 12.4 %
MCH RBC QN AUTO: 32 PG
MCHC RBC AUTO-ENTMCNC: 33.6 G/DL
MCV RBC AUTO: 95 FL
MONOCYTES # BLD AUTO: 0.1 K/UL
MONOCYTES NFR BLD: 1.4 %
NEUTROPHILS # BLD AUTO: 5 K/UL
NEUTROPHILS NFR BLD: 86 %
PLATELET # BLD AUTO: 192 K/UL
PMV BLD AUTO: 9.7 FL
POCT GLUCOSE: 193 MG/DL (ref 70–110)
POCT GLUCOSE: 323 MG/DL (ref 70–110)
POTASSIUM SERPL-SCNC: 4.5 MMOL/L
RBC # BLD AUTO: 2.89 M/UL
SATURATED IRON: 8 %
SODIUM SERPL-SCNC: 142 MMOL/L
TOTAL IRON BINDING CAPACITY: 240 UG/DL
TRANSFERRIN SERPL-MCNC: 162 MG/DL
TROPONIN I SERPL DL<=0.01 NG/ML-MCNC: <0.006 NG/ML
WBC # BLD AUTO: 5.8 K/UL

## 2018-09-16 PROCEDURE — 94640 AIRWAY INHALATION TREATMENT: CPT

## 2018-09-16 PROCEDURE — 94761 N-INVAS EAR/PLS OXIMETRY MLT: CPT

## 2018-09-16 PROCEDURE — 63600175 PHARM REV CODE 636 W HCPCS: Performed by: NURSE PRACTITIONER

## 2018-09-16 PROCEDURE — 80048 BASIC METABOLIC PNL TOTAL CA: CPT

## 2018-09-16 PROCEDURE — 27000221 HC OXYGEN, UP TO 24 HOURS

## 2018-09-16 PROCEDURE — 84484 ASSAY OF TROPONIN QUANT: CPT

## 2018-09-16 PROCEDURE — 93005 ELECTROCARDIOGRAM TRACING: CPT

## 2018-09-16 PROCEDURE — 25000242 PHARM REV CODE 250 ALT 637 W/ HCPCS: Performed by: HOSPITALIST

## 2018-09-16 PROCEDURE — 36415 COLL VENOUS BLD VENIPUNCTURE: CPT

## 2018-09-16 PROCEDURE — 99900035 HC TECH TIME PER 15 MIN (STAT)

## 2018-09-16 PROCEDURE — 25000242 PHARM REV CODE 250 ALT 637 W/ HCPCS: Performed by: NURSE PRACTITIONER

## 2018-09-16 PROCEDURE — 25000003 PHARM REV CODE 250: Performed by: NURSE PRACTITIONER

## 2018-09-16 PROCEDURE — 97116 GAIT TRAINING THERAPY: CPT

## 2018-09-16 PROCEDURE — 85025 COMPLETE CBC W/AUTO DIFF WBC: CPT

## 2018-09-16 RX ORDER — FLUTICASONE FUROATE AND VILANTEROL 100; 25 UG/1; UG/1
1 POWDER RESPIRATORY (INHALATION) DAILY
Qty: 30 EACH | Refills: 0 | Status: ON HOLD | OUTPATIENT
Start: 2018-09-17 | End: 2018-11-29

## 2018-09-16 RX ORDER — POLYETHYLENE GLYCOL 3350 17 G/17G
17 POWDER, FOR SOLUTION ORAL DAILY
Status: DISCONTINUED | OUTPATIENT
Start: 2018-09-16 | End: 2018-09-17 | Stop reason: HOSPADM

## 2018-09-16 RX ORDER — PREDNISONE 20 MG/1
20 TABLET ORAL DAILY
Qty: 5 TABLET | Refills: 0 | Status: SHIPPED | OUTPATIENT
Start: 2018-09-16 | End: 2018-09-21

## 2018-09-16 RX ORDER — FERROUS GLUCONATE 324(38)MG
324 TABLET ORAL
Status: DISCONTINUED | OUTPATIENT
Start: 2018-09-16 | End: 2018-09-17 | Stop reason: HOSPADM

## 2018-09-16 RX ORDER — FERROUS GLUCONATE 324(38)MG
324 TABLET ORAL
COMMUNITY
Start: 2018-09-17 | End: 2018-10-02 | Stop reason: CLARIF

## 2018-09-16 RX ADMIN — FUROSEMIDE 40 MG: 40 TABLET ORAL at 09:09

## 2018-09-16 RX ADMIN — METHYLPREDNISOLONE SODIUM SUCCINATE 40 MG: 40 INJECTION, POWDER, FOR SOLUTION INTRAMUSCULAR; INTRAVENOUS at 05:09

## 2018-09-16 RX ADMIN — ACETAMINOPHEN 650 MG: 325 TABLET, FILM COATED ORAL at 10:09

## 2018-09-16 RX ADMIN — SERTRALINE 25 MG: 25 TABLET, FILM COATED ORAL at 09:09

## 2018-09-16 RX ADMIN — AMLODIPINE BESYLATE 5 MG: 5 TABLET ORAL at 09:09

## 2018-09-16 RX ADMIN — INSULIN ASPART 3 UNITS: 100 INJECTION, SOLUTION INTRAVENOUS; SUBCUTANEOUS at 01:09

## 2018-09-16 RX ADMIN — POLYETHYLENE GLYCOL (3350) 17 G: 17 POWDER, FOR SOLUTION ORAL at 10:09

## 2018-09-16 RX ADMIN — ASPIRIN 81 MG: 81 TABLET, COATED ORAL at 09:09

## 2018-09-16 RX ADMIN — LEVOTHYROXINE SODIUM 100 MCG: 100 TABLET ORAL at 05:09

## 2018-09-16 RX ADMIN — FUROSEMIDE 40 MG: 40 TABLET ORAL at 06:09

## 2018-09-16 RX ADMIN — METHYLPREDNISOLONE SODIUM SUCCINATE 40 MG: 40 INJECTION, POWDER, FOR SOLUTION INTRAMUSCULAR; INTRAVENOUS at 03:09

## 2018-09-16 RX ADMIN — APIXABAN 2.5 MG: 2.5 TABLET, FILM COATED ORAL at 09:09

## 2018-09-16 RX ADMIN — FERROUS GLUCONATE 324 MG: 324 TABLET ORAL at 10:09

## 2018-09-16 RX ADMIN — IPRATROPIUM BROMIDE AND ALBUTEROL SULFATE 3 ML: .5; 3 SOLUTION RESPIRATORY (INHALATION) at 07:09

## 2018-09-16 RX ADMIN — IPRATROPIUM BROMIDE AND ALBUTEROL SULFATE 3 ML: .5; 3 SOLUTION RESPIRATORY (INHALATION) at 11:09

## 2018-09-16 NOTE — PLAN OF CARE
"Problem: Patient Care Overview  Goal: Plan of Care Review  Outcome: Ongoing (interventions implemented as appropriate)  Ambulated to bathroom this morning without oxygen.   No SOB reported.  Sats 91% on room air upon return to bed.  Oxygen reapplied at 2L nc.  Pt denies discomforts or needs at this time.  Glucose 159 per lab.  Pt states "I hope I go home today".  Call light within reach.      "

## 2018-09-16 NOTE — PLAN OF CARE
09/15/18 2011   Patient Assessment/Suction   Level of Consciousness (AVPU) alert   Respiratory Effort Normal;Unlabored   Expansion/Accessory Muscles/Retractions expansion symmetric;no retractions;no use of accessory muscles   All Lung Fields Breath Sounds clear   PRE-TX-O2-ETCO2   O2 Device (Oxygen Therapy) nasal cannula   Flow (L/min) 2   Oxygen Concentration (%) 28   SpO2 97 %   Pulse Oximetry Type Intermittent   Pulse 73   Resp 18   Aerosol Therapy   $ Aerosol Therapy Charges Aerosol Treatment   Respiratory Treatment Status given   SVN/Inhaler Treatment Route mask;with oxygen   Position During Treatment HOB at 45 degrees   Patient Tolerance good   Post-Treatment   Post-treatment Heart Rate (beats/min) 75   Post-treatment Resp Rate (breaths/min) 18   All Fields Breath Sounds unchanged   Incentive Spirometer   $ Incentive Spirometer Charges done independently per patient   Administration (Incentive Spirometer) done independently per patient   Ready to Wean/Extubation Screen   FIO2<=50 (chart decimal) 0.28

## 2018-09-16 NOTE — PROGRESS NOTES
09/16/18 1020   Patient Assessment/Suction   Level of Consciousness (AVPU) alert   PRE-TX-O2-ETCO2   O2 Device (Oxygen Therapy) room air   Home Oxygen Qualification   Room Air SpO2 At Rest (!) 87 %   Recovery O2 LPM 2 LPM   Home O2 Eval Comments pt. qualifies for Home O2

## 2018-09-16 NOTE — PLAN OF CARE
completed arrangements for [facility] and home O2 with Pat from [employer].  She reported that portable will be delivered to patient's bedside prior to discharge.         09/16/18 1600   Final Note   Assessment Type Final Discharge Note   Discharge Disposition Home-Health

## 2018-09-16 NOTE — PLAN OF CARE
Problem: Physical Therapy Goal  Goal: Physical Therapy Goal  Goals to be met by: 2018     Patient will increase functional independence with mobility by performin. Supine to sit with Contact Guard Assistance  2. Rolling to Left with Contact Guard Assistance.  3. Sit to stand transfer with Contact Guard Assistance  4. Gait  x 150 feet with Contact Guard Assistance using Rolling Walker.   5. Lower extremity exercise program x20 reps per handout, with supervision     Outcome: Ongoing (interventions implemented as appropriate)  Ambulated with rw and CGA with O2 attached.

## 2018-09-16 NOTE — PT/OT/SLP PROGRESS
Physical Therapy Treatment    Patient Name:  Blaire Cage   MRN:  [id number]    Recommendations:     Discharge Recommendations:      Discharge Equipment Recommendations:     Barriers to discharge: None    Assessment:     Blaire Cage is a 88 y.o. female admitted with a medical diagnosis of SHALINI (acute kidney injury).  She presents with the following impairments/functional limitations:  weakness, impaired endurance, impaired functional mobilty, impaired cardiopulmonary response to activity, decreased safety awareness, gait instability, pain .    Rehab Prognosis:  fair; patient would benefit from acute skilled PT services to address these deficits and reach maximum level of function.      Recent Surgery: * No surgery found *      Plan:     During this hospitalization, patient to be seen 6 x/week to address the above listed problems via gait training, therapeutic activities, therapeutic exercises  · Plan of Care Expires:  09/28/18   Plan of Care Reviewed with: patient    Subjective     Communicated with nurse Adams prior to session.  Patient found supine in bed upon PT entry to room, agreeable to treatment.      Chief Complaint: reported pain in L heel following ambulation, reports having gout.  Patient comments/goals: eager to be discharged.  Pain/Comfort:  · Pain Rating 1: 0/10    Patients cultural, spiritual, [employer] conflicts given the current situation:      Objective:     Patient found with: telemetry, oxygen     General Precautions: Standard, fall   Orthopedic Precautions:N/A   Braces: N/A     Functional Mobility:  · Bed Mobility:     · Rolling Right: contact guard assistance  · Supine to Sit: contact guard assistance  · Transfers:     · Sit to Stand:  contact guard assistance with rolling walker  · Gait: 100' with rw and CGA with O2 attached.      AM-PAC 6 CLICK MOBILITY          Therapeutic Activities and Exercises:   Sat up in chair with CNA present changing linens.    Patient left up in chair  with all lines intact, call button in reach, nurse Angie notified and CNA present..    GOALS:   Multidisciplinary Problems     Physical Therapy Goals        Problem: Physical Therapy Goal    Goal Priority Disciplines Outcome Goal Variances Interventions   Physical Therapy Goal     PT, PT/OT Ongoing (interventions implemented as appropriate)     Description:  Goals to be met by: 2018     Patient will increase functional independence with mobility by performin. Supine to sit with Contact Guard Assistance  2. Rolling to Left with Contact Guard Assistance.  3. Sit to stand transfer with Contact Guard Assistance  4. Gait  x 150 feet with Contact Guard Assistance using Rolling Walker.   5. Lower extremity exercise program x20 reps per handout, with supervision                      Time Tracking:     PT Received On: 18  PT Start Time: 1158     PT Stop Time: 1208  PT Total Time (min): 10 min     Billable Minutes: Gait Training 10min    Treatment Type: Treatment  PT/PTA: PTA     PTA Visit Number: 1     Jerri Roman, ABEBA  2018

## 2018-09-16 NOTE — PLAN OF CARE
09/16/18 0753   Patient Assessment/Suction   Level of Consciousness (AVPU) alert   Respiratory Effort Unlabored;Normal   Expansion/Accessory Muscles/Retractions no retractions;no use of accessory muscles   All Lung Fields Breath Sounds clear   PRE-TX-O2-ETCO2   O2 Device (Oxygen Therapy) nasal cannula   $ Is the patient on Low Flow Oxygen? Yes   Flow (L/min) 2   Oxygen Concentration (%) 28   SpO2 95 %   Pulse Oximetry Type Intermittent   $ Pulse Oximetry - Multiple Charge Pulse Oximetry - Multiple   Pulse 77   Resp 16   Aerosol Therapy   $ Aerosol Therapy Charges Aerosol Treatment   Respiratory Treatment Status given   SVN/Inhaler Treatment Route mask   Position During Treatment Sitting in bed   Patient Tolerance good   Inhaler   $ Inhaler Charges Refused  (pt. stated she can't afford it at home so she doesn't want t)   Ready to Wean/Extubation Screen   FIO2<=50 (chart decimal) 0.28

## 2018-09-16 NOTE — NURSING
Pt c/o chest tightness and pressure and also pressure to frontal head/forehead area. Reported to NP, NP ordered STAT EKG and STAT Troponin I. Resp called for EKG and Lab informed of STAT order.

## 2018-09-17 LAB — POCT GLUCOSE: 276 MG/DL (ref 70–110)

## 2018-09-17 NOTE — HOSPITAL COURSE
Patient monitored closely during hospitalization. She was initiated on gentle IV hydration and home lasix held for findings of SHALINI. Home BP medication held due to hypotension. She subsequently develop mild acute CHF and IV fluids DC. Hyperkalemia improved with Kayexalate x 1 dose and hydration. She was noted to have hypoxia with exertion associated with wheezing. She received IV solumedrol 40 mg IV and scheduled respiratory treatments with improvement of wheezing. She was evaluated for home oxygen and qualified. Renal function improved. She was give IV Ferrlecit of iron def anemia. PT/OT consulted. She is feeling good and stable for DC.     Long discussion with patient and family. Daughters have made arrangements to stay with mother. DC home with HH. Discussed nephrology recommendation regarding home BP meds. See Med rec

## 2018-09-17 NOTE — NURSING
All discharge instructions and medications reviewed with Pt and daughter at this time. 3 Rx's sent to pharmacy for Pt pick-up, daughter aware. Pt discharging home with HH and Home O2. All questions and concerns addressed. IV removed, catheter intact, pressure applied x 2 minutes, pt tolerated well. Tele monitor removed and returned to monitor room at this time. All Pt belongings with Pt at discharge. Pt wheeled out via w/c to daughter's personal vehicle. Pt AAO, stable condition, NAD noted.

## 2018-09-17 NOTE — DISCHARGE SUMMARY
"[facility] Hospital Medicine  Discharge Summary      Patient Name: Blaire Cage  MRN: [id number]  Admission Date: 9/13/2018  Hospital Length of Stay: 3 days  Discharge Date and Time: 9/16/2018  8:00 PM  Attending Physician: No att. providers found   Discharging Provider: Sadie Saeed NP  Primary Care Provider: Eli Edmonds MD      HPI:   Blaire Cage is a 88 y.o. female with PMH of hypertension, DM type 2, hyperlipidemia, COPD, hypothyroidism, and pulmonary emboli on anticoauglation presents to the ED for evaluation of hypotension and generalized weakness. She was evaluated by ELLIE Ledbetter this am and was noted to have hypotension (no BP noted in primary note).  According to her daughter, she has been feeling "bad for almost 2 weeks, she doesn't feel like doing anything. She is not eating much and needs help walking."  In the ED she was noted to have a blood pressure of 94/53 and evidence of SHALINI with Cr 2.5. Baseline renal function is 1.8. She denies dizziness, lightheadedness and shortness of breath.       * No surgery found *      Hospital Course:   Patient monitored closely during hospitalization. She was initiated on gentle IV hydration and home lasix held for findings of SHALINI. Home BP medication held due to hypotension. She subsequently develop mild acute CHF and IV fluids DC. Hyperkalemia improved with Kayexalate x 1 dose and hydration. She was noted to have hypoxia with exertion associated with wheezing. She received IV solumedrol 40 mg IV and scheduled respiratory treatments with improvement of wheezing. She was evaluated for home oxygen and qualified. Renal function improved. She was give IV Ferrlecit of iron def anemia. PT/OT consulted. She is feeling good and stable for DC.     Long discussion with patient and family. Daughters have made arrangements to stay with mother. DC home with HH. Discussed nephrology recommendation regarding home BP meds. See Med rec "     Consults:   Consults (From admission, onward)        Status Ordering Provider     IP consult to dietary  Once     Provider:  (Not yet assigned)    Completed HIRAM MIRZA          No new Assessment & Plan notes have been filed under this hospital service since the last note was generated.  Service: Hospital Medicine    Final Active Diagnoses:    Diagnosis Date Noted POA    PRINCIPAL PROBLEM:  SHALINI (acute kidney injury) [N17.9] 06/28/2018 Yes    Hyperkalemia [E87.5] 09/13/2018 Yes    Acute on chronic diastolic heart failure [I50.33] 09/14/2018 No    Anemia, chronic renal failure, stage 3 (moderate) [N18.3, D63.1] 06/29/2018 Yes    Controlled type 2 diabetes mellitus, without long-term current use of insulin [E11.9] 06/09/2018 Yes    PE (pulmonary thromboembolism) [I26.99] 06/09/2018 Yes    Tobacco dependence [F17.200] 03/23/2015 Unknown    COPD (chronic obstructive pulmonary disease) with acute bronchitis [J44.0, J20.9] 01/08/2015 Yes    Hyperlipidemia associated with type 2 diabetes mellitus [E11.69, E78.5] 01/18/2013 Yes    Type 2 diabetes mellitus with stage 3 chronic kidney disease [E11.22, N18.3] 01/18/2013 Yes    Hypertension associated with diabetes [E11.59, I10] 01/18/2013 Yes    Hypothyroidism [E03.9] 01/18/2013 Yes      Problems Resolved During this Admission:       Discharged Condition: stable    Disposition: Home-Health Care [facility]    Follow Up:  Follow-up Information     Eli Edmonds MD In 1 week.    Specialty:  Family Medicine  Why:  hospital follow up  Contact information:  [address] [facility] [address]  [phone]             Miguel Jo MD On 10/16/2018.    Specialty:  Nephrology  Contact information:  [address] [facility] [address] [address]  [facility] [id number] [phone]             [facility].    Specialty:  Home Health Services  Why:  Home Health  Contact information:  [address] [facility] [address]  [facility] [address]  [phone]              "    Patient Instructions:      OXYGEN FOR HOME USE     Order Specific Question Answer Comments   Liter Flow 2    Duration Continuous    Qualifying SpO2: 87    Testing done at: Rest    Route nasal cannula    Portable mode: continuous    Device home concentrator portable tank   Length of need (in months): 99 mos    Patient condition with qualifying saturation COPD    Height: 5' 3" (1.6 m)    Weight: 62 kg (136 lb 11 oz)    Does patient have medical equipment at home? walker, rolling    Alternative treatment measures have been tried or considered and deemed clinically ineffective. Yes      Ambulatory referral to Home Health   Referral Priority: Routine Referral Type: Home Health   Referral Reason: Specialty Services Required   Requested Specialty: Home Health Services   Number of Visits Requested: 1     Referral to Home health   Referral Priority: Routine Referral Type: Home Health   Referral Reason: Specialty Services Required   Requested Specialty: Home Health Services   Number of Visits Requested: 1     Ambulatory referral to Home Health   Referral Priority: Routine Referral Type: Home Health   Referral Reason: Specialty Services Required   Requested Specialty: Home Health Services   Number of Visits Requested: 1     Diet renal     Diet renal     Diet Cardiac     Notify your health care provider if you experience any of the following:  redness, tenderness, or signs of infection (pain, swelling, redness, odor or green/yellow discharge around incision site)     Notify your health care provider if you experience any of the following:  severe uncontrolled pain     Notify your health care provider if you experience any of the following:  persistent nausea and vomiting or diarrhea     Notify your health care provider if you experience any of the following:  worsening rash     Notify your health care provider if you experience any of the following:  severe persistent headache     Notify your health care provider if you " experience any of the following:  difficulty breathing or increased cough     Notify your health care provider if you experience any of the following:  increased confusion or weakness     Notify your health care provider if you experience any of the following:  worsening rash     Notify your health care provider if you experience any of the following:  redness, tenderness, or signs of infection (pain, swelling, redness, odor or green/yellow discharge around incision site)     Notify your health care provider if you experience any of the following:  severe uncontrolled pain     Notify your health care provider if you experience any of the following:  persistent nausea and vomiting or diarrhea     Notify your health care provider if you experience any of the following:  increased confusion or weakness     Notify your health care provider if you experience any of the following:  persistent dizziness, light-headedness, or visual disturbances     Notify your health care provider if you experience any of the following:  severe persistent headache     Notify your health care provider if you experience any of the following:  difficulty breathing or increased cough     Notify your health care provider if you experience any of the following:  severe uncontrolled pain     Notify your health care provider if you experience any of the following:  persistent nausea and vomiting or diarrhea     Notify your health care provider if you experience any of the following:  redness, tenderness, or signs of infection (pain, swelling, redness, odor or green/yellow discharge around incision site)     Activity as tolerated     Activity as tolerated     Activity as tolerated       Significant Diagnostic Studies: Labs:   BMP:   Recent Labs   Lab  09/16/18   0500   GLU  159*   NA  142   K  4.5   CL  103   CO2  28   BUN  39*   CREATININE  1.4   CALCIUM  9.1    and CMP   Recent Labs   Lab  09/16/18   0500   NA  142   K  4.5   CL  103   CO2  28    GLU  159*   BUN  39*   CREATININE  1.4   CALCIUM  9.1   ANIONGAP  11   ESTGFRAFRICA  39*   EGFRNONAA  34*     Radiology: X-Ray: CXR: X-Ray Chest 1 View (CXR):   Results for orders placed or performed during the hospital encounter of 09/13/18   X-Ray Chest 1 View    Narrative    EXAMINATION:  XR CHEST 1 VIEW    CLINICAL HISTORY:  sob;    TECHNIQUE:  Single frontal view of the chest was performed.    COMPARISON:  Chest x-ray 09/13/2018.    FINDINGS:  Pulmonary hyperinflation flattening diaphragms consistent with COPD.  Increasing discoid atelectasis at the lung bases.  No focal consolidation.  Small bilateral pleural effusions.    Heart size remains enlarged.  Mediastinal contours unremarkable.  Trachea midline.    There is bone demineralization.      Impression    1. COPD with increasing dependent atelectasis at the lung bases.  2. Small bilateral pleural effusions.  3. Cardiomegaly.  4. Bone demineralization.      Electronically signed by: Teo Casper  Date:    09/16/2018  Time:    09:40       Pending Diagnostic Studies:     Procedure Component Value Units Date/Time    EKG 12-lead [[id number]]     Order Status:  Sent Lab Status:  No result          Medications:  Reconciled Home Medications:      Medication List      START taking these medications    ferrous gluconate 324 MG tablet  Commonly known as:  FERGON  Take 1 tablet (324 mg total) by mouth daily with breakfast.     fluticasone-vilanterol 100-25 mcg/dose diskus inhaler  Commonly known as:  BREO  Inhale 1 puff into the lungs once daily. Controller     predniSONE 20 MG tablet  Commonly known as:  DELTASONE  Take 1 tablet (20 mg total) by mouth once daily. for 5 days        CHANGE how you take these medications    amLODIPine 5 MG tablet  Commonly known as:  NORVASC  Take 1 tablet (5 mg total) by mouth once daily.  What changed:    · medication strength  · how much to take     nitroGLYCERIN 0.4 MG SL tablet  Commonly known as:  NITROSTAT  Place 1 tablet (0.4  mg total) under the tongue every 5 (five) minutes as needed for Chest pain. As needed  What changed:  additional instructions        CONTINUE taking these medications    albuterol-ipratropium 2.5 mg-0.5 mg/3 mL nebulizer solution  Commonly known as:  DUO-NEB  USE ONE VIAL IN NEBULIZER EVERY 6 HOURS WHILE AWAKE     apixaban 5 mg Tab  Commonly known as:  ELIQUIS  Take 1 tablet (5 mg total) by mouth 2 (two) times daily.     aspirin 81 MG EC tablet  Commonly known as:  ECOTRIN  Take 81 mg by mouth once daily.     calcium carbonate 500 mg calcium (1,250 mg) tablet  Commonly known as:  OS-TASIA  Take 1 tablet by mouth 2 (two) times daily.     cloNIDine 0.1 MG tablet  Commonly known as:  CATAPRES  Take 1 tablet (0.1 mg total) by mouth 2 (two) times daily. Take one tablet by mouth every hour as needed for systolic blood pressure greater than 180.  Do not take more than 3 tablets in 24 hours.     ferrous sulfate 325 mg (65 mg iron) Tab tablet  Commonly known as:  FEOSOL  Take 1 tablet (325 mg total) by mouth 2 (two) times daily.     fish oil-omega-3 fatty acids 300-1,000 mg capsule  Take 2 g by mouth every evening.     fluticasone 50 mcg/actuation nasal spray  Commonly known as:  FLONASE  2 sprays by Each Nare route once daily.     folic acid-vit B6-vit B12 2.5-25-2 mg 2.5-25-2 mg Tab  Commonly known as:  FOLBIC or Equiv  Take 1 tablet by mouth once daily.     furosemide 40 MG tablet  Commonly known as:  LASIX  Take 40 mg by mouth 2 (two) times daily.     gabapentin 300 MG capsule  Commonly known as:  NEURONTIN  Take 1 capsule (300 mg total) by mouth every evening.     guanFACINE 1 MG Tab  Commonly known as:  TENEX  TAKE 1 TABLET BY MOUTH IN THE EVENING     levothyroxine 100 MCG tablet  Commonly known as:  SYNTHROID  Take 1 tablet (100 mcg total) by mouth before breakfast.     magnesium oxide 400 mg tablet  Commonly known as:  MAG-OX  TAKE ONE TABLET BY MOUTH ONCE DAILY     montelukast 10 mg tablet  Commonly known as:   SINGULAIR  Take 1 tablet (10 mg total) by mouth every evening.     sertraline 25 MG tablet  Commonly known as:  ZOLOFT  Take 1 tablet (25 mg total) by mouth once daily.     simvastatin 40 MG tablet  Commonly known as:  ZOCOR  TAKE ONE TABLET BY MOUTH ONCE DAILY IN THE EVENING     traMADol 50 mg tablet  Commonly known as:  ULTRAM  Take 50 mg by mouth every 6 (six) hours as needed for Pain.     VITAMIN C 500 MG tablet  Generic drug:  ascorbic acid (vitamin C)  Take 500 mg by mouth once daily.     vitamin D 1000 units Tab  Commonly known as:  VITAMIN D3  Take 1 tablet (1,000 Units total) by mouth once daily.        STOP taking these medications    losartan 100 MG tablet  Commonly known as:  COZAAR     methylPREDNISolone 4 mg tablet  Commonly known as:  MEDROL DOSEPACK            Indwelling Lines/Drains at time of discharge:   Lines/Drains/Airways          None          Time spent on the discharge of patient: 45 minutes  Patient was seen and examined on the date of discharge and determined to be suitable for discharge.         Sadie Saeed NP  Department of Hospital Medicine  [facility]

## 2018-09-18 ENCOUNTER — TELEPHONE (OUTPATIENT)
Dept: MEDSURG UNIT | Facility: HOSPITAL | Age: 83
End: 2018-09-18

## 2018-09-26 ENCOUNTER — LAB VISIT (OUTPATIENT)
Dept: LAB | Facility: HOSPITAL | Age: 83
End: 2018-09-26
Attending: FAMILY MEDICINE
Payer: MEDICARE

## 2018-09-26 ENCOUNTER — OFFICE VISIT (OUTPATIENT)
Dept: FAMILY MEDICINE | Facility: CLINIC | Age: 83
End: 2018-09-26
Payer: MEDICARE

## 2018-09-26 VITALS
OXYGEN SATURATION: 98 % | BODY MASS INDEX: 23.04 KG/M2 | DIASTOLIC BLOOD PRESSURE: 58 MMHG | TEMPERATURE: 98 F | HEIGHT: 63 IN | SYSTOLIC BLOOD PRESSURE: 128 MMHG | HEART RATE: 63 BPM | WEIGHT: 130.06 LBS | RESPIRATION RATE: 16 BRPM

## 2018-09-26 DIAGNOSIS — Z23 FLU VACCINE NEED: ICD-10-CM

## 2018-09-26 DIAGNOSIS — E11.22 TYPE 2 DIABETES MELLITUS WITH STAGE 3 CHRONIC KIDNEY DISEASE, WITHOUT LONG-TERM CURRENT USE OF INSULIN: ICD-10-CM

## 2018-09-26 DIAGNOSIS — E03.9 HYPOTHYROIDISM, UNSPECIFIED TYPE: ICD-10-CM

## 2018-09-26 DIAGNOSIS — N18.30 TYPE 2 DIABETES MELLITUS WITH STAGE 3 CHRONIC KIDNEY DISEASE, WITHOUT LONG-TERM CURRENT USE OF INSULIN: ICD-10-CM

## 2018-09-26 DIAGNOSIS — D50.0 IRON DEFICIENCY ANEMIA DUE TO CHRONIC BLOOD LOSS: ICD-10-CM

## 2018-09-26 DIAGNOSIS — E78.5 HYPERLIPIDEMIA ASSOCIATED WITH TYPE 2 DIABETES MELLITUS: ICD-10-CM

## 2018-09-26 DIAGNOSIS — N18.30 CKD (CHRONIC KIDNEY DISEASE), STAGE III: ICD-10-CM

## 2018-09-26 DIAGNOSIS — E11.69 HYPERLIPIDEMIA ASSOCIATED WITH TYPE 2 DIABETES MELLITUS: ICD-10-CM

## 2018-09-26 DIAGNOSIS — E11.59 HYPERTENSION ASSOCIATED WITH DIABETES: Primary | ICD-10-CM

## 2018-09-26 DIAGNOSIS — I15.2 HYPERTENSION ASSOCIATED WITH DIABETES: Primary | ICD-10-CM

## 2018-09-26 LAB
ALBUMIN SERPL BCP-MCNC: 3.2 G/DL
ALP SERPL-CCNC: 71 U/L
ALT SERPL W/O P-5'-P-CCNC: 40 U/L
ANION GAP SERPL CALC-SCNC: 8 MMOL/L
AST SERPL-CCNC: 29 U/L
BASOPHILS # BLD AUTO: 0.01 K/UL
BASOPHILS NFR BLD: 0.2 %
BILIRUB SERPL-MCNC: 0.2 MG/DL
BUN SERPL-MCNC: 67 MG/DL
CALCIUM SERPL-MCNC: 9.1 MG/DL
CHLORIDE SERPL-SCNC: 94 MMOL/L
CO2 SERPL-SCNC: 30 MMOL/L
CREAT SERPL-MCNC: 2.3 MG/DL
DIFFERENTIAL METHOD: ABNORMAL
EOSINOPHIL # BLD AUTO: 0 K/UL
EOSINOPHIL NFR BLD: 0.3 %
ERYTHROCYTE [DISTWIDTH] IN BLOOD BY AUTOMATED COUNT: 12.8 %
EST. GFR  (AFRICAN AMERICAN): 21.2 ML/MIN/1.73 M^2
EST. GFR  (NON AFRICAN AMERICAN): 18.4 ML/MIN/1.73 M^2
FERRITIN SERPL-MCNC: 470 NG/ML
GLUCOSE SERPL-MCNC: 196 MG/DL
HCT VFR BLD AUTO: 29 %
HGB BLD-MCNC: 9 G/DL
IMM GRANULOCYTES # BLD AUTO: 0.09 K/UL
IMM GRANULOCYTES NFR BLD AUTO: 1.5 %
IRON SERPL-MCNC: 34 UG/DL
LYMPHOCYTES # BLD AUTO: 0.7 K/UL
LYMPHOCYTES NFR BLD: 11.3 %
MCH RBC QN AUTO: 31.3 PG
MCHC RBC AUTO-ENTMCNC: 31 G/DL
MCV RBC AUTO: 101 FL
MONOCYTES # BLD AUTO: 0.2 K/UL
MONOCYTES NFR BLD: 3 %
NEUTROPHILS # BLD AUTO: 5 K/UL
NEUTROPHILS NFR BLD: 83.7 %
NRBC BLD-RTO: 0 /100 WBC
PLATELET # BLD AUTO: 215 K/UL
PMV BLD AUTO: 11.6 FL
POTASSIUM SERPL-SCNC: 4.7 MMOL/L
PROT SERPL-MCNC: 6.4 G/DL
RBC # BLD AUTO: 2.88 M/UL
SATURATED IRON: 11 %
SODIUM SERPL-SCNC: 132 MMOL/L
TOTAL IRON BINDING CAPACITY: 300 UG/DL
TRANSFERRIN SERPL-MCNC: 203 MG/DL
WBC # BLD AUTO: 5.94 K/UL

## 2018-09-26 PROCEDURE — 83540 ASSAY OF IRON: CPT

## 2018-09-26 PROCEDURE — 85025 COMPLETE CBC W/AUTO DIFF WBC: CPT

## 2018-09-26 PROCEDURE — 80053 COMPREHEN METABOLIC PANEL: CPT

## 2018-09-26 PROCEDURE — 99999 PR PBB SHADOW E&M-EST. PATIENT-LVL III: CPT | Mod: PBBFAC,,, | Performed by: FAMILY MEDICINE

## 2018-09-26 PROCEDURE — 82728 ASSAY OF FERRITIN: CPT

## 2018-09-26 PROCEDURE — 1101F PT FALLS ASSESS-DOCD LE1/YR: CPT | Mod: CPTII,,, | Performed by: FAMILY MEDICINE

## 2018-09-26 PROCEDURE — 90662 IIV NO PRSV INCREASED AG IM: CPT | Mod: PBBFAC,PO

## 2018-09-26 PROCEDURE — 36415 COLL VENOUS BLD VENIPUNCTURE: CPT | Mod: PO

## 2018-09-26 PROCEDURE — 99214 OFFICE O/P EST MOD 30 MIN: CPT | Mod: S$PBB,,, | Performed by: FAMILY MEDICINE

## 2018-09-26 PROCEDURE — 99213 OFFICE O/P EST LOW 20 MIN: CPT | Mod: PBBFAC,PO,25 | Performed by: FAMILY MEDICINE

## 2018-09-26 NOTE — PROGRESS NOTES
Subjective:       Patient ID: Blaire Cage is a 88 y.o. female.    Chief Complaint: Follow-up (6mth f/u)    HPI  Review of Systems   Constitutional: Negative for fatigue and unexpected weight change.   Respiratory: Negative for chest tightness and shortness of breath.    Cardiovascular: Negative for chest pain, palpitations and leg swelling.   Gastrointestinal: Negative for abdominal pain.   Musculoskeletal: Negative for arthralgias.   Neurological: Negative for dizziness, syncope, light-headedness and headaches.       Patient Active Problem List   Diagnosis    Diabetic neuropathy, type II diabetes mellitus    CKD (chronic kidney disease), stage III, eGFR 39, progressive 31    Hypothyroidism    Hypertension associated with diabetes    Hyperlipidemia associated with type 2 diabetes mellitus    Type 2 diabetes mellitus with stage 3 chronic kidney disease    Right carotid bruit    COPD (chronic obstructive pulmonary disease) with acute bronchitis    Anxiety    Tobacco dependence    Abdominal aortic aneurysm without rupture    Hip arthritis    Degenerative spinal arthritis    Osteoporosis    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 10/2016, 11/2016    Body mass index (BMI) 23.0-23.9, adult, today 24.1    Iron deficiency anemia due to chronic blood loss    Proteinuria due to type 2 diabetes mellitus    History of syncope in childhood    Prerenal azotemia    Drug-induced constipation    COPD with acute exacerbation    Asthmatic bronchitis with acute exacerbation    Shortness of breath    Controlled type 2 diabetes mellitus, without long-term current use of insulin    Acute pulmonary embolism    SOB (shortness of breath)    Asthma-COPD overlap syndrome    Eosinophilic asthma     SHALINI (acute kidney injury)    Moderate malnutrition    Uncontrolled type 2 diabetes mellitus with kidney complication, without long-term current use of insulin    Chronic anticoagulation    Constipation    DVT (deep venous thrombosis)    PE (pulmonary thromboembolism)    Anemia due to multiple mechanisms    Anemia, chronic disease    Normocytic normochromic anemia    Anemia, chronic renal failure, stage 3 (moderate)    Homocysteinemia    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    Hyperkalemia    Acute on chronic diastolic heart failure     Patient is here for a chronic conditions follow up.    Admission on 09/13/2018, Discharged on 09/16/2018   Component Date Value Ref Range Status    WBC 09/13/2018 7.60  3.90 - 12.70 K/uL Final    RBC 09/13/2018 2.71* 4.00 - 5.40 M/uL Final    Hemoglobin 09/13/2018 8.6* 12.0 - 16.0 g/dL Final    Hematocrit 09/13/2018 26.0* 37.0 - 48.5 % Final    MCV 09/13/2018 96  82 - 98 fL Final    MCH 09/13/2018 31.9* 27.0 - 31.0 pg Final    MCHC 09/13/2018 33.1  32.0 - 36.0 g/dL Final    RDW 09/13/2018 14.0  11.5 - 14.5 % Final    Platelets 09/13/2018 196  150 - 350 K/uL Final    MPV 09/13/2018 9.3  9.2 - 12.9 fL Final    Gran # (ANC) 09/13/2018 5.3  1.8 - 7.7 K/uL Final    Lymph # 09/13/2018 1.1  1.0 - 4.8 K/uL Final    Mono # 09/13/2018 0.9  0.3 - 1.0 K/uL Final    Eos # 09/13/2018 0.2  0.0 - 0.5 K/uL Final    Baso # 09/13/2018 0.00  0.00 - 0.20 K/uL Final    Gran% 09/13/2018 69.4  38.0 - 73.0 % Final    Lymph% 09/13/2018 14.7* 18.0 - 48.0 % Final    Mono% 09/13/2018 12.4  4.0 - 15.0 % Final    Eosinophil% 09/13/2018 3.2  0.0 - 8.0 % Final    Basophil% 09/13/2018 0.3  0.0 - 1.9 % Final    Differential Method 09/13/2018 Automated   Final    Sodium 09/13/2018 136  136 - 145 mmol/L Final    Potassium 09/13/2018 6.2* 3.5 - 5.1 mmol/L Final    Chloride 09/13/2018 103  95 - 110 mmol/L Final    CO2 09/13/2018 22* 23 - 29 mmol/L Final    Glucose 09/13/2018 144* 70  - 110 mg/dL Final    BUN, Bld 09/13/2018 57* 8 - 23 mg/dL Final    Creatinine 09/13/2018 2.5* 0.5 - 1.4 mg/dL Final    Calcium 09/13/2018 9.3  8.7 - 10.5 mg/dL Final    Total Protein 09/13/2018 6.5  6.0 - 8.4 g/dL Final    Albumin 09/13/2018 3.0* 3.5 - 5.2 g/dL Final    Total Bilirubin 09/13/2018 0.6  0.1 - 1.0 mg/dL Final    Alkaline Phosphatase 09/13/2018 65  55 - 135 U/L Final    AST 09/13/2018 56* 10 - 40 U/L Final    ALT 09/13/2018 60* 10 - 44 U/L Final    Anion Gap 09/13/2018 11  8 - 16 mmol/L Final    eGFR if African American 09/13/2018 19* >60 mL/min/1.73 m^2 Final    eGFR if non African American 09/13/2018 17* >60 mL/min/1.73 m^2 Final    Specimen UA 09/13/2018 Urine, Catheterized   Final    Color, UA 09/13/2018 Yellow  Yellow, Straw, Yuki Final    Appearance, UA 09/13/2018 Clear  Clear Final    pH, UA 09/13/2018 6.0  5.0 - 8.0 Final    Specific Gravity, UA 09/13/2018 1.010  1.005 - 1.030 Final    Protein, UA 09/13/2018 Negative  Negative Final    Glucose, UA 09/13/2018 Negative  Negative Final    Ketones, UA 09/13/2018 Negative  Negative Final    Bilirubin (UA) 09/13/2018 Negative  Negative Final    Occult Blood UA 09/13/2018 3+* Negative Final    Nitrite, UA 09/13/2018 Negative  Negative Final    Urobilinogen, UA 09/13/2018 Negative  <2.0 EU/dL Final    Leukocytes, UA 09/13/2018 Negative  Negative Final    Troponin I 09/13/2018 <0.006  0.000 - 0.026 ng/mL Final    TSH 09/13/2018 0.189* 0.400 - 4.000 uIU/mL Final    RBC, UA 09/13/2018 40* 0 - 4 /hpf Final    WBC, UA 09/13/2018 2  0 - 5 /hpf Final    Bacteria, UA 09/13/2018 Occasional  None-Occ /hpf Final    Squam Epithel, UA 09/13/2018 5  /hpf Final    Hyaline Casts, UA 09/13/2018 40* 0-1/lpf /lpf Final    Microscopic Comment 09/13/2018 SEE COMMENT   Final    Free T4 09/13/2018 0.92  0.71 - 1.51 ng/dL Final    POCT Glucose 09/13/2018 190* 70 - 110 mg/dL Final    POCT Glucose 09/13/2018 142* 70 - 110 mg/dL Final     Sodium 09/14/2018 141  136 - 145 mmol/L Final    Potassium 09/14/2018 5.0  3.5 - 5.1 mmol/L Final    Chloride 09/14/2018 108  95 - 110 mmol/L Final    CO2 09/14/2018 27  23 - 29 mmol/L Final    Glucose 09/14/2018 133* 70 - 110 mg/dL Final    BUN, Bld 09/14/2018 46* 8 - 23 mg/dL Final    Creatinine 09/14/2018 1.8* 0.5 - 1.4 mg/dL Final    Calcium 09/14/2018 8.1* 8.7 - 10.5 mg/dL Final    Anion Gap 09/14/2018 6* 8 - 16 mmol/L Final    eGFR if  09/14/2018 29* >60 mL/min/1.73 m^2 Final    eGFR if non African American 09/14/2018 25* >60 mL/min/1.73 m^2 Final    WBC 09/14/2018 6.50  3.90 - 12.70 K/uL Final    RBC 09/14/2018 2.47* 4.00 - 5.40 M/uL Final    Hemoglobin 09/14/2018 7.9* 12.0 - 16.0 g/dL Final    Hematocrit 09/14/2018 23.7* 37.0 - 48.5 % Final    MCV 09/14/2018 96  82 - 98 fL Final    MCH 09/14/2018 31.9* 27.0 - 31.0 pg Final    MCHC 09/14/2018 33.3  32.0 - 36.0 g/dL Final    RDW 09/14/2018 14.1  11.5 - 14.5 % Final    Platelets 09/14/2018 167  150 - 350 K/uL Final    MPV 09/14/2018 9.1* 9.2 - 12.9 fL Final    Gran # (ANC) 09/14/2018 4.5  1.8 - 7.7 K/uL Final    Lymph # 09/14/2018 1.0  1.0 - 4.8 K/uL Final    Mono # 09/14/2018 0.8  0.3 - 1.0 K/uL Final    Eos # 09/14/2018 0.3  0.0 - 0.5 K/uL Final    Baso # 09/14/2018 0.00  0.00 - 0.20 K/uL Final    Gran% 09/14/2018 68.5  38.0 - 73.0 % Final    Lymph% 09/14/2018 15.1* 18.0 - 48.0 % Final    Mono% 09/14/2018 12.0  4.0 - 15.0 % Final    Eosinophil% 09/14/2018 4.3  0.0 - 8.0 % Final    Basophil% 09/14/2018 0.1  0.0 - 1.9 % Final    Differential Method 09/14/2018 Automated   Final    POCT Glucose 09/14/2018 141* 70 - 110 mg/dL Final    POCT Glucose 09/14/2018 152* 70 - 110 mg/dL Final    POCT Glucose 09/14/2018 208* 70 - 110 mg/dL Final    Sodium 09/15/2018 142  136 - 145 mmol/L Final    Potassium 09/15/2018 4.3  3.5 - 5.1 mmol/L Final    Chloride 09/15/2018 108  95 - 110 mmol/L Final    CO2 09/15/2018 27  23 -  29 mmol/L Final    Glucose 09/15/2018 114* 70 - 110 mg/dL Final    BUN, Bld 09/15/2018 41* 8 - 23 mg/dL Final    Creatinine 09/15/2018 1.5* 0.5 - 1.4 mg/dL Final    Calcium 09/15/2018 8.3* 8.7 - 10.5 mg/dL Final    Anion Gap 09/15/2018 7* 8 - 16 mmol/L Final    eGFR if  09/15/2018 36* >60 mL/min/1.73 m^2 Final    eGFR if non African American 09/15/2018 31* >60 mL/min/1.73 m^2 Final    WBC 09/15/2018 6.10  3.90 - 12.70 K/uL Final    RBC 09/15/2018 2.48* 4.00 - 5.40 M/uL Final    Hemoglobin 09/15/2018 7.9* 12.0 - 16.0 g/dL Final    Hematocrit 09/15/2018 23.8* 37.0 - 48.5 % Final    MCV 09/15/2018 96  82 - 98 fL Final    MCH 09/15/2018 31.6* 27.0 - 31.0 pg Final    MCHC 09/15/2018 33.1  32.0 - 36.0 g/dL Final    RDW 09/15/2018 13.7  11.5 - 14.5 % Final    Platelets 09/15/2018 171  150 - 350 K/uL Final    MPV 09/15/2018 9.4  9.2 - 12.9 fL Final    Gran # (ANC) 09/15/2018 4.0  1.8 - 7.7 K/uL Final    Lymph # 09/15/2018 1.1  1.0 - 4.8 K/uL Final    Mono # 09/15/2018 0.7  0.3 - 1.0 K/uL Final    Eos # 09/15/2018 0.2  0.0 - 0.5 K/uL Final    Baso # 09/15/2018 0.00  0.00 - 0.20 K/uL Final    Gran% 09/15/2018 65.6  38.0 - 73.0 % Final    Lymph% 09/15/2018 18.1  18.0 - 48.0 % Final    Mono% 09/15/2018 12.2  4.0 - 15.0 % Final    Eosinophil% 09/15/2018 3.8  0.0 - 8.0 % Final    Basophil% 09/15/2018 0.3  0.0 - 1.9 % Final    Differential Method 09/15/2018 Automated   Final    POCT Glucose 09/15/2018 217* 70 - 110 mg/dL Final    Specimen UA 09/15/2018 Urine, Clean Catch   Final    Color, UA 09/15/2018 Yellow  Yellow, Straw, Yuki Final    Appearance, UA 09/15/2018 Clear  Clear Final    pH, UA 09/15/2018 6.0  5.0 - 8.0 Final    Specific Gravity, UA 09/15/2018 1.010  1.005 - 1.030 Final    Protein, UA 09/15/2018 Negative  Negative Final    Glucose, UA 09/15/2018 Negative  Negative Final    Ketones, UA 09/15/2018 Negative  Negative Final    Bilirubin (UA) 09/15/2018 Negative   Negative Final    Occult Blood UA 09/15/2018 1+* Negative Final    Nitrite, UA 09/15/2018 Negative  Negative Final    Urobilinogen, UA 09/15/2018 Negative  <2.0 EU/dL Final    Leukocytes, UA 09/15/2018 Negative  Negative Final    Iron 09/15/2018 19* 30 - 160 ug/dL Final    Transferrin 09/15/2018 162* 200 - 375 mg/dL Final    TIBC 09/15/2018 240* 250 - 450 ug/dL Final    Saturated Iron 09/15/2018 8* 20 - 50 % Final    RBC, UA 09/15/2018 2  0 - 4 /hpf Final    WBC, UA 09/15/2018 3  0 - 5 /hpf Final    Bacteria, UA 09/15/2018 Few* None-Occ /hpf Final    Squam Epithel, UA 09/15/2018 1  /hpf Final    Microscopic Comment 09/15/2018 SEE COMMENT   Final    POCT Glucose 09/15/2018 114* 70 - 110 mg/dL Final    POCT Glucose 09/15/2018 140* 70 - 110 mg/dL Final    Sodium 09/16/2018 142  136 - 145 mmol/L Final    Potassium 09/16/2018 4.5  3.5 - 5.1 mmol/L Final    Chloride 09/16/2018 103  95 - 110 mmol/L Final    CO2 09/16/2018 28  23 - 29 mmol/L Final    Glucose 09/16/2018 159* 70 - 110 mg/dL Final    BUN, Bld 09/16/2018 39* 8 - 23 mg/dL Final    Creatinine 09/16/2018 1.4  0.5 - 1.4 mg/dL Final    Calcium 09/16/2018 9.1  8.7 - 10.5 mg/dL Final    Anion Gap 09/16/2018 11  8 - 16 mmol/L Final    eGFR if  09/16/2018 39* >60 mL/min/1.73 m^2 Final    eGFR if non  09/16/2018 34* >60 mL/min/1.73 m^2 Final    WBC 09/16/2018 5.80  3.90 - 12.70 K/uL Final    RBC 09/16/2018 2.89* 4.00 - 5.40 M/uL Final    Hemoglobin 09/16/2018 9.2* 12.0 - 16.0 g/dL Final    Hematocrit 09/16/2018 27.4* 37.0 - 48.5 % Final    MCV 09/16/2018 95  82 - 98 fL Final    MCH 09/16/2018 32.0* 27.0 - 31.0 pg Final    MCHC 09/16/2018 33.6  32.0 - 36.0 g/dL Final    RDW 09/16/2018 13.3  11.5 - 14.5 % Final    Platelets 09/16/2018 192  150 - 350 K/uL Final    MPV 09/16/2018 9.7  9.2 - 12.9 fL Final    Gran # (ANC) 09/16/2018 5.0  1.8 - 7.7 K/uL Final    Lymph # 09/16/2018 0.7* 1.0 - 4.8 K/uL Final     Mono # 09/16/2018 0.1* 0.3 - 1.0 K/uL Final    Eos # 09/16/2018 0.0  0.0 - 0.5 K/uL Final    Baso # 09/16/2018 0.00  0.00 - 0.20 K/uL Final    Gran% 09/16/2018 86.0* 38.0 - 73.0 % Final    Lymph% 09/16/2018 12.4* 18.0 - 48.0 % Final    Mono% 09/16/2018 1.4* 4.0 - 15.0 % Final    Eosinophil% 09/16/2018 0.0  0.0 - 8.0 % Final    Basophil% 09/16/2018 0.2  0.0 - 1.9 % Final    Differential Method 09/16/2018 Automated   Final    Troponin I 09/16/2018 <0.006  0.000 - 0.026 ng/mL Final    POCT Glucose 09/16/2018 323* 70 - 110 mg/dL Final    POCT Glucose 09/16/2018 193* 70 - 110 mg/dL Final    POCT Glucose 09/16/2018 276* 70 - 110 mg/dL Final   Lab Visit on 08/29/2018   Component Date Value Ref Range Status    WBC 08/29/2018 8.02  3.90 - 12.70 K/uL Final    RBC 08/29/2018 2.94* 4.00 - 5.40 M/uL Final    Hemoglobin 08/29/2018 9.5* 12.0 - 16.0 g/dL Final    Hematocrit 08/29/2018 29.4* 37.0 - 48.5 % Final    MCV 08/29/2018 100* 82 - 98 fL Final    MCH 08/29/2018 32.3* 27.0 - 31.0 pg Final    MCHC 08/29/2018 32.3  32.0 - 36.0 g/dL Final    RDW 08/29/2018 12.8  11.5 - 14.5 % Final    Platelets 08/29/2018 182  150 - 350 K/uL Final    MPV 08/29/2018 11.4  9.2 - 12.9 fL Final    Immature Granulocytes 08/29/2018 0.5  0.0 - 0.5 % Final    Gran # (ANC) 08/29/2018 5.7  1.8 - 7.7 K/uL Final    Immature Grans (Abs) 08/29/2018 0.04  0.00 - 0.04 K/uL Final    Lymph # 08/29/2018 1.2  1.0 - 4.8 K/uL Final    Mono # 08/29/2018 0.9  0.3 - 1.0 K/uL Final    Eos # 08/29/2018 0.1  0.0 - 0.5 K/uL Final    Baso # 08/29/2018 0.04  0.00 - 0.20 K/uL Final    nRBC 08/29/2018 0  0 /100 WBC Final    Gran% 08/29/2018 70.8  38.0 - 73.0 % Final    Lymph% 08/29/2018 15.0* 18.0 - 48.0 % Final    Mono% 08/29/2018 11.5  4.0 - 15.0 % Final    Eosinophil% 08/29/2018 1.7  0.0 - 8.0 % Final    Basophil% 08/29/2018 0.5  0.0 - 1.9 % Final    Differential Method 08/29/2018 Automated   Final    CRP 08/29/2018 150.6* 0.0 - 8.2  mg/L Final    Sed Rate 08/29/2018 100* 0 - 20 mm/Hr Final    Uric Acid 08/29/2018 9.8* 2.4 - 5.7 mg/dL Final     Admitted 9/13/18 for IVVD, SHALINI.  Found to be very anemic and given IV iron.  Has f/u with Dr. Alvarez 11/7/18  Transitional Care Note    Family and/or Caretaker present at visit?  No.  Diagnostic tests reviewed/disposition: No diagnosic tests pending after this hospitalization.  Disease/illness education: yes  Home health/community services discussion/referrals: Patient does not have home health established from hospital visit.  They do not need home health.  If needed, we will set up home health for the patient.   Establishment or re-establishment of referral orders for community resources: No other necessary community resources.   Discussion with other health care providers: No discussion with other health care providers necessary.     Patient has a recent hospitalization which is summarized above.  Communication (direct contact by telephone or electronic mail) with the patient and/or caregiver was documented within 2 business days of discharge.    Medical decision making was based on a face-to-face visit scheduled within the indicated time frame.  Medication reconciliation and management was completed at this visit.    USing oxygen and walker. Prednisone has stopped  Objective:      Physical Exam   Constitutional: She is oriented to person, place, and time. She appears well-developed and well-nourished.   Cardiovascular: Normal rate, regular rhythm and normal heart sounds.   Pulmonary/Chest: Effort normal and breath sounds normal.   Musculoskeletal: She exhibits no edema.   Neurological: She is alert and oriented to person, place, and time.   Skin: Skin is warm and dry.   Psychiatric: She has a normal mood and affect.   Nursing note and vitals reviewed.      Assessment:       1. Hypertension associated with diabetes    2. Hyperlipidemia associated with type 2 diabetes mellitus    3. CKD (chronic  kidney disease), stage III, eGFR 39, progressive 31    4. Iron deficiency anemia due to chronic blood loss    5. Hypothyroidism, unspecified type    6. Type 2 diabetes mellitus with stage 3 chronic kidney disease, without long-term current use of insulin    7. Flu vaccine need        Plan:         1. Hypertension associated with diabetes  Controlled on current medications.  Continue current medications.      2. Hyperlipidemia associated with type 2 diabetes mellitus  Stable condition.  Continue current medications.  Will adjust based on lab findings or if condition changes.      3. CKD (chronic kidney disease), stage III, eGFR 39, progressive 31  Stable and chronic.  Will continue to monitor q3-6 months and control chronic conditions as optimally as possible to preserve function.      4. Iron deficiency anemia due to chronic blood loss  Stable condition.  Continue current medications.  Will adjust based on lab findings or if condition changes.    - CBC auto differential; Future  - Comprehensive metabolic panel; Future  - Ferritin; Future  - Iron and TIBC; Future    5. Hypothyroidism, unspecified type  Stable condition.  Continue current medications.  Will adjust based on lab findings or if condition changes.      6. Type 2 diabetes mellitus with stage 3 chronic kidney disease, without long-term current use of insulin  Stable condition.  Continue current medications.  Will adjust based on lab findings or if condition changes.      7. Flu vaccine need  Immunize today.  Counseled patient on risks, benefits and side effects.  Patient elected to proceed with vaccination.    - Influenza - High Dose (65+) (PF) (IM)    Time spent with patient: 20 minutes    Patient with be reevaluated in 4 weeks with BOB Smith or sooner prn    Greater than 50% of this visit was spent counseling as described in above documentation:Yes

## 2018-10-01 ENCOUNTER — HOSPITAL ENCOUNTER (EMERGENCY)
Facility: HOSPITAL | Age: 83
Discharge: HOME | End: 2018-10-01
Attending: EMERGENCY MEDICINE
Payer: MEDICARE

## 2018-10-01 VITALS
RESPIRATION RATE: 20 BRPM | DIASTOLIC BLOOD PRESSURE: 64 MMHG | SYSTOLIC BLOOD PRESSURE: 134 MMHG | OXYGEN SATURATION: 100 % | TEMPERATURE: 98 F | HEART RATE: 62 BPM

## 2018-10-01 DIAGNOSIS — E86.0 DEHYDRATION: ICD-10-CM

## 2018-10-01 DIAGNOSIS — K59.00 CONSTIPATION, UNSPECIFIED CONSTIPATION TYPE: Primary | ICD-10-CM

## 2018-10-01 DIAGNOSIS — N17.9 AKI (ACUTE KIDNEY INJURY): ICD-10-CM

## 2018-10-01 LAB
ALBUMIN SERPL BCP-MCNC: 3.3 G/DL
ALP SERPL-CCNC: 75 U/L
ALT SERPL W/O P-5'-P-CCNC: 40 U/L
ANION GAP SERPL CALC-SCNC: 13 MMOL/L
AST SERPL-CCNC: 34 U/L
BACTERIA #/AREA URNS HPF: ABNORMAL /HPF
BASOPHILS # BLD AUTO: 0 K/UL
BASOPHILS NFR BLD: 0.3 %
BILIRUB SERPL-MCNC: 0.4 MG/DL
BILIRUB UR QL STRIP: NEGATIVE
BUN SERPL-MCNC: 55 MG/DL
CALCIUM SERPL-MCNC: 9 MG/DL
CHLORIDE SERPL-SCNC: 95 MMOL/L
CLARITY UR: CLEAR
CO2 SERPL-SCNC: 25 MMOL/L
COLOR UR: YELLOW
CREAT SERPL-MCNC: 2.5 MG/DL
DIFFERENTIAL METHOD: ABNORMAL
EOSINOPHIL # BLD AUTO: 0.1 K/UL
EOSINOPHIL NFR BLD: 1.1 %
ERYTHROCYTE [DISTWIDTH] IN BLOOD BY AUTOMATED COUNT: 13.8 %
EST. GFR  (AFRICAN AMERICAN): 19 ML/MIN/1.73 M^2
EST. GFR  (NON AFRICAN AMERICAN): 17 ML/MIN/1.73 M^2
GLUCOSE SERPL-MCNC: 111 MG/DL
GLUCOSE UR QL STRIP: NEGATIVE
HCT VFR BLD AUTO: 29.2 %
HGB BLD-MCNC: 9.8 G/DL
HGB UR QL STRIP: NEGATIVE
HYALINE CASTS #/AREA URNS LPF: 3 /LPF
KETONES UR QL STRIP: NEGATIVE
LEUKOCYTE ESTERASE UR QL STRIP: ABNORMAL
LIPASE SERPL-CCNC: 41 U/L
LYMPHOCYTES # BLD AUTO: 1.4 K/UL
LYMPHOCYTES NFR BLD: 13.9 %
MAGNESIUM SERPL-MCNC: 2.5 MG/DL
MCH RBC QN AUTO: 31.6 PG
MCHC RBC AUTO-ENTMCNC: 33.6 G/DL
MCV RBC AUTO: 94 FL
MICROSCOPIC COMMENT: ABNORMAL
MONOCYTES # BLD AUTO: 0.7 K/UL
MONOCYTES NFR BLD: 7.3 %
NEUTROPHILS # BLD AUTO: 7.6 K/UL
NEUTROPHILS NFR BLD: 77.4 %
NITRITE UR QL STRIP: NEGATIVE
PH UR STRIP: 6 [PH] (ref 5–8)
PHOSPHATE SERPL-MCNC: 3.7 MG/DL
PLATELET # BLD AUTO: 229 K/UL
PMV BLD AUTO: 9.5 FL
POTASSIUM SERPL-SCNC: 3.9 MMOL/L
PROT SERPL-MCNC: 6.3 G/DL
PROT UR QL STRIP: NEGATIVE
RBC # BLD AUTO: 3.11 M/UL
SODIUM SERPL-SCNC: 133 MMOL/L
SP GR UR STRIP: 1.01 (ref 1–1.03)
URN SPEC COLLECT METH UR: ABNORMAL
UROBILINOGEN UR STRIP-ACNC: NEGATIVE EU/DL
WBC # BLD AUTO: 9.9 K/UL
WBC #/AREA URNS HPF: 2 /HPF (ref 0–5)

## 2018-10-01 PROCEDURE — 99285 EMERGENCY DEPT VISIT HI MDM: CPT | Mod: 25

## 2018-10-01 PROCEDURE — 81000 URINALYSIS NONAUTO W/SCOPE: CPT

## 2018-10-01 PROCEDURE — 93010 ELECTROCARDIOGRAM REPORT: CPT | Mod: ,,, | Performed by: INTERNAL MEDICINE

## 2018-10-01 PROCEDURE — 85025 COMPLETE CBC W/AUTO DIFF WBC: CPT

## 2018-10-01 PROCEDURE — 25000003 PHARM REV CODE 250: Performed by: EMERGENCY MEDICINE

## 2018-10-01 PROCEDURE — 63600175 PHARM REV CODE 636 W HCPCS: Performed by: EMERGENCY MEDICINE

## 2018-10-01 PROCEDURE — 83735 ASSAY OF MAGNESIUM: CPT

## 2018-10-01 PROCEDURE — 80053 COMPREHEN METABOLIC PANEL: CPT

## 2018-10-01 PROCEDURE — 93005 ELECTROCARDIOGRAM TRACING: CPT

## 2018-10-01 PROCEDURE — 84100 ASSAY OF PHOSPHORUS: CPT

## 2018-10-01 PROCEDURE — 96374 THER/PROPH/DIAG INJ IV PUSH: CPT

## 2018-10-01 PROCEDURE — 36415 COLL VENOUS BLD VENIPUNCTURE: CPT

## 2018-10-01 PROCEDURE — 96361 HYDRATE IV INFUSION ADD-ON: CPT

## 2018-10-01 PROCEDURE — 83690 ASSAY OF LIPASE: CPT

## 2018-10-01 RX ORDER — METHYLPREDNISOLONE SOD SUCC 125 MG
125 VIAL (EA) INJECTION
Status: DISCONTINUED | OUTPATIENT
Start: 2018-10-01 | End: 2018-10-01

## 2018-10-01 RX ORDER — FUROSEMIDE 40 MG/1
20 TABLET ORAL 2 TIMES DAILY
Qty: 30 TABLET | Refills: 0 | Status: ON HOLD | OUTPATIENT
Start: 2018-10-01 | End: 2018-10-04 | Stop reason: SDUPTHER

## 2018-10-01 RX ORDER — ORPHENADRINE CITRATE 30 MG/ML
60 INJECTION INTRAMUSCULAR; INTRAVENOUS
Status: DISCONTINUED | OUTPATIENT
Start: 2018-10-01 | End: 2018-10-01

## 2018-10-01 RX ORDER — SYRING-NEEDL,DISP,INSUL,0.3 ML 29 G X1/2"
296 SYRINGE, EMPTY DISPOSABLE MISCELLANEOUS ONCE
Qty: 295 ML | Refills: 0 | Status: SHIPPED | OUTPATIENT
Start: 2018-10-01 | End: 2018-10-02 | Stop reason: CLARIF

## 2018-10-01 RX ORDER — ONDANSETRON 2 MG/ML
4 INJECTION INTRAMUSCULAR; INTRAVENOUS
Status: COMPLETED | OUTPATIENT
Start: 2018-10-01 | End: 2018-10-01

## 2018-10-01 RX ADMIN — ONDANSETRON 4 MG: 2 INJECTION INTRAMUSCULAR; INTRAVENOUS at 06:10

## 2018-10-01 RX ADMIN — SODIUM CHLORIDE 500 ML: 0.9 INJECTION, SOLUTION INTRAVENOUS at 07:10

## 2018-10-01 NOTE — ED NOTES
A, a, o x4, w&d, color pale, mucous membranes dry. Reports generalized weakness today. Denies pain, sob, dizziness. Respirations easy, regular, abdomen soft, non-tender. States last BM was 5 days ago. Family at bedside, SR up x2.

## 2018-10-01 NOTE — ED PROVIDER NOTES
"Encounter Date: 10/1/2018    SCRIBE #1 NOTE: I, Angelina Escobar, am scribing for, and in the presence of, Dr. Ruben Jara.       History     Chief Complaint   Patient presents with    Abdominal Pain    Vomiting       Time seen by provider: 6:14 PM on 10/01/2018    Blaire Cage is a 88 y.o. female with HTN, HLD, DM type II, COPD, PE, DVT, CHF, and anemia who presents to the ED with multiple complaints. She became weak, dizzy, and light-headed and began having abdominal pain with associated nausea and vomiting "sometime" within the past ~7-8 hours. The pt felt ill two days ago, but she became better before becoming ill a couple of hours ago. Her Granddaughter explained her blood pressure was 90/42. She endorses burning urination and constipation. Her last bowel movement was 6 days ago and she takes iron supplements. The pt's bowel movements are typically black as a result of taking the iron supplements. The patient denies diarrhea, chests pain, shortness of breath, blood in stool, or any other symptoms at this time. The pt's family explains they come the ED once a month for dehydration. She is usually taken off her Lasix medication, but then she has too much fluid build up and is put back on her Lasix. She takes 40 mg tablets of Lasix two times a day. Pt's SHx includes hysterectomy, appendectomy, and hernia repair. Pt is allergic to Carvedilol, Boniva, Codeine, Hydralazine analogues, Iodinated Contrast Oral and IV dye, Kionex, Lisinopril, and Morphine.       The history is provided by the patient and a relative (Daughters and Granddaughter).     Review of patient's allergies indicates:   Allergen Reactions    Carvedilol Other (See Comments)     Bradycardia and syncope    Boniva [ibandronate]     Codeine     Hydralazine analogues     Iodinated contrast- oral and iv dye Hives    Kionex [sodium polystyrene sulfonate] Diarrhea     From encounter 12/11/17 for diarrhea--not true allergy.    Lisinopril  "    Morphine      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 8/7/2018    Hip arthritis 3/1/2016    Homocysteinemia 8/7/2018    Hyperlipidemia     Hypertension     Normocytic normochromic anemia 6/29/2018    PE (pulmonary thromboembolism) 06/09/2018    Pneumonia of right lower lobe due to infectious organism 9/11/2017    Thyroid disease     hypothyroid     Past Surgical History:   Procedure Laterality Date    APPENDECTOMY      ESOPHAGOGASTRODUODENOSCOPY (EGD) N/A 10/25/2017    [facility]    FRACTURE SURGERY      right hip     HERNIA REPAIR      groin    HYSTERECTOMY      VARICOSE VEIN SURGERY       Family History   Problem Relation Age of Onset    Hypertension Mother     Heart disease Mother     Lung disease Father     Diabetes Sister     Diabetes Brother     Kidney disease Son      Social History     Tobacco Use    Smoking status: Former Smoker     Packs/day: 0.35     Years: 44.00     Pack years: 15.40     Types: Cigarettes     Last attempt to quit: 4/30/2015     Years since quitting: 3.4    Smokeless tobacco: Never Used    Tobacco comment: 1/08/2015   Substance Use Topics    Alcohol use: No     Alcohol/week: 0.0 oz    Drug use: No     Review of Systems   Constitutional: Positive for fatigue. Negative for appetite change and fever.   HENT: Negative for sore throat.    Respiratory: Negative for shortness of breath.    Cardiovascular: Negative for chest pain.   Gastrointestinal: Positive for abdominal pain, constipation, nausea and  vomiting. Negative for blood in stool and diarrhea.   Genitourinary: Positive for dysuria.   Musculoskeletal: Negative for back pain.   Skin: Negative for rash.   Neurological: Positive for dizziness, weakness and light-headedness.        Positive for generalized body weakness.   Hematological: Does not bruise/bleed easily.       Physical Exam     Initial Vitals [10/01/18 1740]   BP Pulse Resp Temp SpO2   (!) 80/41 62 20 97.7 °F (36.5 °C) 98 %      MAP       --         Physical Exam    Nursing note and vitals reviewed.  Constitutional: She appears well-developed and well-nourished.  Non-toxic appearance. No distress.   HENT:   Head: Normocephalic and atraumatic.   Mouth/Throat: Mucous membranes are dry.   Eyes: EOM are normal. Pupils are equal, round, and reactive to light.   Neck: Normal range of motion. Neck supple. No neck rigidity. No JVD present.   Cardiovascular: Normal rate, regular rhythm, normal heart sounds and intact distal pulses. Exam reveals no gallop and no friction rub.    No murmur heard.  Pulmonary/Chest: Breath sounds normal. She has no wheezes. She has no rhonchi. She has no rales.   Abdominal: Soft. Bowel sounds are normal. She exhibits no distension. There is no tenderness. There is no rigidity, no rebound and no guarding.   Musculoskeletal: Normal range of motion.   Neurological: She is alert and oriented to person, place, and time. She has normal strength and normal reflexes. No cranial nerve deficit or sensory deficit. She exhibits normal muscle tone. Coordination normal. GCS eye subscore is 4. GCS verbal subscore is 5. GCS motor subscore is 6.   Skin: Skin is warm and dry.   Psychiatric: She has a normal mood and affect. Her speech is normal and behavior is normal. She is not actively hallucinating.         ED Course   Procedures  Labs Reviewed   CBC W/ AUTO DIFFERENTIAL - Abnormal; Notable for the following components:       Result Value    RBC 3.11 (*)     Hemoglobin 9.8 (*)      Hematocrit 29.2 (*)     MCH 31.6 (*)     Gran% 77.4 (*)     Lymph% 13.9 (*)     All other components within normal limits   COMPREHENSIVE METABOLIC PANEL - Abnormal; Notable for the following components:    Sodium 133 (*)     Glucose 111 (*)     BUN, Bld 55 (*)     Creatinine 2.5 (*)     Albumin 3.3 (*)     eGFR if  19 (*)     eGFR if non  17 (*)     All other components within normal limits   URINALYSIS, REFLEX TO URINE CULTURE - Abnormal; Notable for the following components:    Leukocytes, UA Trace (*)     All other components within normal limits    Narrative:     Preferred Collection Type->Urine, Clean Catch   URINALYSIS MICROSCOPIC - Abnormal; Notable for the following components:    Bacteria, UA Moderate (*)     Hyaline Casts, UA 3 (*)     All other components within normal limits    Narrative:     Preferred Collection Type->Urine, Clean Catch   LIPASE   MAGNESIUM   PHOSPHORUS     EKG Readings: (Independently Interpreted)   Initial Reading: No STEMI.   Normal sinus rhythm. Rate of 61 bpm. Septal infarct, age undetermined. Normal axis.       Imaging Results          X-Ray Abdomen Flat And Erect (Final result)  Result time 10/02/18 08:19:47    Final result by Teo Hewitt MD (10/02/18 08:19:47)                 Impression:      No acute process.  Possible bilateral nephrolithiasis.  Postoperative change.      Electronically signed by: Teo Hewitt MD  Date:    10/02/2018  Time:    08:19             Narrative:    EXAMINATION:  XR ABDOMEN FLAT AND ERECT    CLINICAL HISTORY:  Abdominal Pain;    TECHNIQUE:  Flat and erect AP views of the abdomen were performed.    COMPARISON:  12/13/2016    FINDINGS:  The bowel gas pattern is nonobstructive.  Surgical clips are present right upper quadrant.  Bilateral upper quadrant calcifications are present measuring up to 8 mm on the left, and may reflect renal stones.  There is degenerative change and mild levocurvature of the spine.   Mild atelectasis is present at the right lung base.  A right hip arthroplasty is partially seen.                                 Medical Decision Making:   History:   Old Medical Records: I decided to obtain old medical records.  Initial Assessment:   Patient is an 88-year-old woman with a history of anemia of chronic disease, renal failure, COPD, on Lasix who presents emergency department for evaluation of nausea with vomiting, abdominal pain, constipation.  She had some initial mild hypertension on arrival that resolved with IV fluids.  Urinalysis showed moderate bacteria with 2 white blood cells.  CBC had a hemoglobin of 9.8 and hematocrit of 29.2 which is at her baseline.  Renal function had a creatinine of 2.5 with a BUN of 55 indicating decreased renal function/a KI.  Last labs also showed a creatinine of 2.4/2.5 but she has been and admitted to the hospital for a KI with these findings since her baseline was lower around 1.8.  Patient has been taking Lasix 40 mg twice a day which she believes is too much.  X-ray abdomen flat and erect shows constipation without evidence of obstruction.  She has no abdominal tenderness on examination. I have low suspicion for mesenteric ischemia, colitis, bowel obstruction.  Patient was given antiemetics in the emergency department with resolution of her symptoms.  She tolerated oral intake.  She was offered admission for gentle IV fluid hydration and re-evaluation of her Lasix regimen/antihypertensives but patient would like to go home and follow up on outpatient basis.  I will decrease her Lasix from 40 mg b.i.d. to 20 mg b.i.d. as this may be leading to her dehydration which in turn led to her hypotension.  She was also to take magnesium citrate for her constipation since she has not had a bowel movement in 6 days.  She did have small bowel movement in the ED.  Return precautions discussed.  She is discharged improved in no acute distress.  Clinical Tests:   Lab Tests:  Ordered and Reviewed  Radiological Study: Ordered and Reviewed  Medical Tests: Ordered and Reviewed            Scribe Attestation:   Scribe #1: I performed the above scribed service and the documentation accurately describes the services I performed. I attest to the accuracy of the note.    I, Marek Turner, personally performed the services described in this documentation. All medical record entries made by the scribe were at my direction and in my presence.  I have reviewed the chart and agree that the record reflects my personal performance and is accurate and complete. Ruben Jara MD.  4:08 PM 10/02/2018             Clinical Impression:   The primary encounter diagnosis was Constipation, unspecified constipation type. Diagnoses of Dehydration and SHALINI (acute kidney injury) were also pertinent to this visit.      Disposition:   Disposition: Discharged  Condition: Stable                        Ruben Jara MD  10/02/18 0051

## 2018-10-02 ENCOUNTER — TELEPHONE (OUTPATIENT)
Dept: CARDIOLOGY | Facility: CLINIC | Age: 83
End: 2018-10-02

## 2018-10-02 ENCOUNTER — HOSPITAL ENCOUNTER (OUTPATIENT)
Facility: HOSPITAL | Age: 83
Discharge: HOME HEALTH | End: 2018-10-04
Attending: EMERGENCY MEDICINE | Admitting: HOSPITALIST
Payer: MEDICARE

## 2018-10-02 DIAGNOSIS — K52.9 COLITIS, ACUTE: Primary | ICD-10-CM

## 2018-10-02 DIAGNOSIS — D50.0 IRON DEFICIENCY ANEMIA DUE TO CHRONIC BLOOD LOSS: ICD-10-CM

## 2018-10-02 DIAGNOSIS — N17.9 AKI (ACUTE KIDNEY INJURY): ICD-10-CM

## 2018-10-02 PROBLEM — Z86.711 HISTORY OF PULMONARY EMBOLISM: Status: ACTIVE | Noted: 2018-06-09

## 2018-10-02 LAB
ALBUMIN SERPL BCP-MCNC: 2.9 G/DL
ALP SERPL-CCNC: 70 U/L
ALT SERPL W/O P-5'-P-CCNC: 38 U/L
ANION GAP SERPL CALC-SCNC: 10 MMOL/L
AST SERPL-CCNC: 52 U/L
BASOPHILS # BLD AUTO: 0 K/UL
BASOPHILS NFR BLD: 0 %
BILIRUB SERPL-MCNC: 0.4 MG/DL
BUN SERPL-MCNC: 57 MG/DL
CALCIUM SERPL-MCNC: 8.5 MG/DL
CHLORIDE SERPL-SCNC: 94 MMOL/L
CO2 SERPL-SCNC: 26 MMOL/L
CREAT SERPL-MCNC: 2.6 MG/DL
DIFFERENTIAL METHOD: ABNORMAL
EOSINOPHIL # BLD AUTO: 0.2 K/UL
EOSINOPHIL NFR BLD: 1.5 %
ERYTHROCYTE [DISTWIDTH] IN BLOOD BY AUTOMATED COUNT: 14.1 %
EST. GFR  (AFRICAN AMERICAN): 18 ML/MIN/1.73 M^2
EST. GFR  (NON AFRICAN AMERICAN): 16 ML/MIN/1.73 M^2
GLUCOSE SERPL-MCNC: 159 MG/DL
HCT VFR BLD AUTO: 26.1 %
HGB BLD-MCNC: 8.7 G/DL
LACTATE SERPL-SCNC: 1.1 MMOL/L
LYMPHOCYTES # BLD AUTO: 1.5 K/UL
LYMPHOCYTES NFR BLD: 14.4 %
MCH RBC QN AUTO: 31.7 PG
MCHC RBC AUTO-ENTMCNC: 33.3 G/DL
MCV RBC AUTO: 95 FL
MONOCYTES # BLD AUTO: 0.6 K/UL
MONOCYTES NFR BLD: 6.1 %
NEUTROPHILS # BLD AUTO: 8 K/UL
NEUTROPHILS NFR BLD: 78 %
PLATELET # BLD AUTO: 189 K/UL
PMV BLD AUTO: 8.8 FL
POCT GLUCOSE: 202 MG/DL (ref 70–110)
POTASSIUM SERPL-SCNC: 4.1 MMOL/L
PROT SERPL-MCNC: 5.5 G/DL
RBC # BLD AUTO: 2.74 M/UL
SODIUM SERPL-SCNC: 130 MMOL/L
WBC # BLD AUTO: 10.2 K/UL

## 2018-10-02 PROCEDURE — G0378 HOSPITAL OBSERVATION PER HR: HCPCS

## 2018-10-02 PROCEDURE — 83605 ASSAY OF LACTIC ACID: CPT

## 2018-10-02 PROCEDURE — 63600175 PHARM REV CODE 636 W HCPCS: Performed by: NURSE PRACTITIONER

## 2018-10-02 PROCEDURE — 99285 EMERGENCY DEPT VISIT HI MDM: CPT | Mod: 25

## 2018-10-02 PROCEDURE — 80053 COMPREHEN METABOLIC PANEL: CPT

## 2018-10-02 PROCEDURE — 36415 COLL VENOUS BLD VENIPUNCTURE: CPT

## 2018-10-02 PROCEDURE — 25000003 PHARM REV CODE 250: Performed by: NURSE PRACTITIONER

## 2018-10-02 PROCEDURE — 94760 N-INVAS EAR/PLS OXIMETRY 1: CPT

## 2018-10-02 PROCEDURE — 86140 C-REACTIVE PROTEIN: CPT

## 2018-10-02 PROCEDURE — 85025 COMPLETE CBC W/AUTO DIFF WBC: CPT

## 2018-10-02 RX ORDER — RAMELTEON 8 MG/1
8 TABLET ORAL NIGHTLY PRN
Status: DISCONTINUED | OUTPATIENT
Start: 2018-10-02 | End: 2018-10-04 | Stop reason: HOSPADM

## 2018-10-02 RX ORDER — GLUCAGON 1 MG
1 KIT INJECTION
Status: DISCONTINUED | OUTPATIENT
Start: 2018-10-02 | End: 2018-10-04 | Stop reason: HOSPADM

## 2018-10-02 RX ORDER — IBUPROFEN 200 MG
24 TABLET ORAL
Status: DISCONTINUED | OUTPATIENT
Start: 2018-10-02 | End: 2018-10-04 | Stop reason: HOSPADM

## 2018-10-02 RX ORDER — IBUPROFEN 200 MG
16 TABLET ORAL
Status: DISCONTINUED | OUTPATIENT
Start: 2018-10-02 | End: 2018-10-04 | Stop reason: HOSPADM

## 2018-10-02 RX ORDER — SODIUM CHLORIDE 9 MG/ML
INJECTION, SOLUTION INTRAVENOUS CONTINUOUS
Status: DISCONTINUED | OUTPATIENT
Start: 2018-10-02 | End: 2018-10-04 | Stop reason: HOSPADM

## 2018-10-02 RX ORDER — ACETAMINOPHEN 325 MG/1
650 TABLET ORAL EVERY 4 HOURS PRN
Status: DISCONTINUED | OUTPATIENT
Start: 2018-10-02 | End: 2018-10-04 | Stop reason: HOSPADM

## 2018-10-02 RX ORDER — PROCHLORPERAZINE EDISYLATE 5 MG/ML
5 INJECTION INTRAMUSCULAR; INTRAVENOUS EVERY 6 HOURS PRN
Status: DISCONTINUED | OUTPATIENT
Start: 2018-10-02 | End: 2018-10-04 | Stop reason: HOSPADM

## 2018-10-02 RX ORDER — ONDANSETRON 2 MG/ML
4 INJECTION INTRAMUSCULAR; INTRAVENOUS EVERY 4 HOURS PRN
Status: DISCONTINUED | OUTPATIENT
Start: 2018-10-02 | End: 2018-10-04 | Stop reason: HOSPADM

## 2018-10-02 RX ORDER — AMOXICILLIN 250 MG
1 CAPSULE ORAL 2 TIMES DAILY PRN
Status: DISCONTINUED | OUTPATIENT
Start: 2018-10-02 | End: 2018-10-04 | Stop reason: HOSPADM

## 2018-10-02 RX ORDER — SIMVASTATIN 40 MG/1
TABLET, FILM COATED ORAL
Qty: 90 TABLET | Refills: 3 | Status: SHIPPED | OUTPATIENT
Start: 2018-10-02 | End: 2019-07-31 | Stop reason: SDUPTHER

## 2018-10-02 RX ORDER — INSULIN ASPART 100 [IU]/ML
0-5 INJECTION, SOLUTION INTRAVENOUS; SUBCUTANEOUS
Status: DISCONTINUED | OUTPATIENT
Start: 2018-10-02 | End: 2018-10-04 | Stop reason: HOSPADM

## 2018-10-02 RX ADMIN — SODIUM CHLORIDE: 0.9 INJECTION, SOLUTION INTRAVENOUS at 11:10

## 2018-10-02 RX ADMIN — INSULIN ASPART 1 UNITS: 100 INJECTION, SOLUTION INTRAVENOUS; SUBCUTANEOUS at 11:10

## 2018-10-02 NOTE — TELEPHONE ENCOUNTER
Called and spoke with Laura, Ms Rudd's granddaughter. She stated that Ms. Rudd's BP was 91/42 yesterday evening and she was having some weakness with abdominal and back fang, so they brought her to the ER. Whent ey got there her BP was 80/41. They advised them she was constipated, and gave her some medication to help with that along with some IV fluids. When she was discharged they told her to hold her lasix today 10/2/18 and reduce her lasix to 20 mg BID starting tomorrow 10/3/18. She stated that they are taking her back to the ER right now because her diastolic BP was in the 30s. I gave her an appt for tomorrow 10/3/18 @ 1430. I advised that if they admit her to call us and we will cancel that appt. She verbalized understanding. No further issues noted.

## 2018-10-02 NOTE — ED PROVIDER NOTES
Encounter Date: 10/2/2018    SCRIBE #1 NOTE: IPuja am scribing for, and in the presence of, Dr. Jara .       History     Chief Complaint   Patient presents with    Hypotension       Time seen by provider: 6:51 PM on 10/02/2018    Blaire Cage is a 88 y.o. female with a hx of HTN, HLD, DM type II, COPD, PE, DVT, CHF, and anemia who presents to the ED with c/o hypotension PTA. Daughter notes pt's BP was 91/33 PTA prompting her to seek further evaluation. Pt has been taking 40 mg Lasix q.d. She reports BP of 136/68 this morning around noon. Pt endorsed abd cramping and vomiting today. She notes compliance with Clonidine. Pt has an appointment with cardiologist tomorrow afternoon. Allergens include Carvedilol.       The history is provided by the patient.     Review of patient's allergies indicates:   Allergen Reactions    Carvedilol Other (See Comments)     Bradycardia and syncope    Boniva [ibandronate]     Codeine     Hydralazine analogues     Iodinated contrast- oral and iv dye Hives    Kionex [sodium polystyrene sulfonate] Diarrhea     From encounter 12/11/17 for diarrhea--not true allergy.   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 8/7/2018    Hip arthritis 3/1/2016    Homocysteinemia 8/7/2018    Hyperlipidemia     Hypertension     Normocytic normochromic anemia 6/29/2018    PE (pulmonary thromboembolism) 06/09/2018    Pneumonia of right lower lobe due to infectious  "organism 9/11/2017    Thyroid disease     hypothyroid     Past Surgical History:   Procedure Laterality Date    APPENDECTOMY      CHOLECYSTECTOMY      ESOPHAGOGASTRODUODENOSCOPY (EGD) N/A 10/25/2017    [facility]    FRACTURE SURGERY      right hip     HERNIA REPAIR      groin    HYSTERECTOMY      VARICOSE VEIN SURGERY       Family History   Problem Relation Age of Onset    Hypertension Mother     Heart disease Mother     Lung disease Father     Diabetes Sister     Diabetes Brother     Kidney disease Son      Social History     Tobacco Use    Smoking status: Former Smoker     Packs/day: 0.35     Years: 44.00     Pack years: 15.40     Types: Cigarettes     Last attempt to quit: 4/30/2015     Years since quitting: 3.4    Smokeless tobacco: Never Used    Tobacco comment: 1/08/2015   Substance Use Topics    Alcohol use: No     Alcohol/week: 0.0 oz    Drug use: No     Review of Systems   Constitutional: Negative for fever.        + for "hypotension"    HENT: Negative for sore throat.    Eyes: Negative for redness.   Respiratory: Negative for shortness of breath.    Cardiovascular: Negative for chest pain.   Gastrointestinal: Positive for abdominal pain, nausea and vomiting.   Genitourinary: Negative for dysuria.   Musculoskeletal: Negative for back pain.   Skin: Negative for rash.   Neurological: Negative for weakness.   Hematological: Does not bruise/bleed easily.       Physical Exam     Initial Vitals [10/02/18 1801]   BP Pulse Resp Temp SpO2   122/67 64 14 98.3 °F (36.8 °C) 97 %      MAP       --         Physical Exam    Nursing note and vitals reviewed.  Constitutional: She appears well-developed and well-nourished.  Non-toxic appearance. No distress.   HENT:   Head: Normocephalic and atraumatic.   Eyes: EOM are normal. Pupils are equal, round, and reactive to light.   Neck: Normal range of motion. Neck supple. No neck rigidity. No JVD present.   Cardiovascular: " Normal rate, regular rhythm, normal heart sounds and intact distal pulses. Exam reveals no gallop and no friction rub.    No murmur heard.  Pulmonary/Chest: Breath sounds normal. She has no wheezes. She has no rhonchi. She has no rales.   Abdominal: Soft. Bowel sounds are normal. She exhibits no distension. There is no tenderness. There is no rigidity, no rebound and no guarding.   Musculoskeletal: Normal range of motion.   Neurological: She is alert and oriented to person, place, and time. She has normal strength and normal reflexes. No cranial nerve deficit or sensory deficit. She exhibits normal muscle tone. Coordination normal. GCS eye subscore is 4. GCS verbal subscore is 5. GCS motor subscore is 6.   Skin: Skin is warm and dry.   Psychiatric: She has a normal mood and affect. Her speech is normal and behavior is normal. She is not actively hallucinating.         ED Course   Procedures  Labs Reviewed   COMPREHENSIVE METABOLIC PANEL - Abnormal; Notable for the following components:       Result Value    Sodium 130 (*)     Chloride 94 (*)     Glucose 159 (*)     BUN, Bld 57 (*)     Creatinine 2.6 (*)     Calcium 8.5 (*)     Total Protein 5.5 (*)     Albumin 2.9 (*)     AST 52 (*)     eGFR if  18 (*)     eGFR if non  16 (*)     All other components within normal limits          Imaging Results          CT Abdomen Pelvis  Without Contrast (In process)                  Medical Decision Making:   History:   Old Medical Records: I decided to obtain old medical records.  Initial Assessment:   88-year-old woman presents with hypotension nausea/vomiting left upper quadrant abdominal pain.  On Lasix.  No fever.  Left upper abdominal tenderness. BUN and creatinine worsening from yesterday now 57 and 2.6 respectively.  CT shows inflammation of the left colon concerning for infectious versus inflammatory versus ischemic.  Patient on anticoagulation and does not have pain out of proportion to  exam, low suspicion for ischemic colitis.  Lactic acid added on.  Discussed with hospitalist who agrees to admit the patient for IV fluids, antibiotics and further evaluation.            Scribe Attestation:   Scribe #1: I performed the above scribed service and the documentation accurately describes the services I performed. I attest to the accuracy of the note.    I, Marek Turner, personally performed the services described in this documentation. All medical record entries made by the scribe were at my direction and in my presence.  I have reviewed the chart and agree that the record reflects my personal performance and is accurate and complete. Ruben Jara MD.  10:59 PM 10/02/2018             Clinical Impression:     1. SHALINI (acute kidney injury)          Disposition:   Disposition: Placed in Observation                        Ruben Jara MD  10/02/18 [id number]

## 2018-10-02 NOTE — TELEPHONE ENCOUNTER
----- Message from Nataly Diaz sent at 10/2/2018  3:16 PM CDT -----            Name of Who is Calling: Laura Cage(granddaughter)      What is the request in detail: PT's granddaughter states it's urgent. She would like to speak with the nurse regarding her grandmother's blood pressure. She states her diastolic blood presure is 39 and it was in the 40's on yesterday. She was also seen in the ER yesterday and they gave her some fluid and sent her home. She needs to discuss the plan of care for pt. Please call to discuss.      Can the clinic reply by [facility]: yes      What Number to Call Back if not in [facility]: [phone]

## 2018-10-03 ENCOUNTER — TELEPHONE (OUTPATIENT)
Dept: CARDIOLOGY | Facility: CLINIC | Age: 83
End: 2018-10-03

## 2018-10-03 PROBLEM — K52.9 COLITIS, ACUTE: Status: ACTIVE | Noted: 2018-10-03

## 2018-10-03 PROBLEM — D63.8 ANEMIA OF CHRONIC DISEASE: Status: ACTIVE | Noted: 2018-10-03

## 2018-10-03 PROBLEM — N18.9 ACUTE KIDNEY INJURY SUPERIMPOSED ON CHRONIC KIDNEY DISEASE: Status: ACTIVE | Noted: 2018-06-28

## 2018-10-03 PROBLEM — R00.1 BRADYCARDIA: Status: ACTIVE | Noted: 2018-10-03

## 2018-10-03 PROBLEM — E11.29 TYPE 2 DIABETES MELLITUS WITH KIDNEY COMPLICATION, WITHOUT LONG-TERM CURRENT USE OF INSULIN: Status: ACTIVE | Noted: 2018-06-28

## 2018-10-03 PROBLEM — N20.0 KIDNEY STONES: Status: ACTIVE | Noted: 2018-10-03

## 2018-10-03 PROBLEM — D53.9 MACROCYTIC ANEMIA: Status: ACTIVE | Noted: 2018-10-03

## 2018-10-03 LAB
ANION GAP SERPL CALC-SCNC: 8 MMOL/L
BACTERIA #/AREA URNS HPF: ABNORMAL /HPF
BASOPHILS # BLD AUTO: 0 K/UL
BASOPHILS NFR BLD: 0.3 %
BILIRUB UR QL STRIP: NEGATIVE
BUN SERPL-MCNC: 55 MG/DL
CALCIUM SERPL-MCNC: 8.2 MG/DL
CHLORIDE SERPL-SCNC: 99 MMOL/L
CLARITY UR: CLEAR
CO2 SERPL-SCNC: 28 MMOL/L
COLOR UR: YELLOW
CREAT SERPL-MCNC: 2.1 MG/DL
CREAT UR-MCNC: 46 MG/DL
CRP SERPL-MCNC: 27.2 MG/L
DIFFERENTIAL METHOD: ABNORMAL
EOSINOPHIL # BLD AUTO: 0.2 K/UL
EOSINOPHIL NFR BLD: 3 %
EOSINOPHIL URNS QL WRIGHT STN: NORMAL
ERYTHROCYTE [DISTWIDTH] IN BLOOD BY AUTOMATED COUNT: 14 %
EST. GFR  (AFRICAN AMERICAN): 24 ML/MIN/1.73 M^2
EST. GFR  (NON AFRICAN AMERICAN): 21 ML/MIN/1.73 M^2
GLUCOSE SERPL-MCNC: 96 MG/DL
GLUCOSE UR QL STRIP: NEGATIVE
HCT VFR BLD AUTO: 24.5 %
HGB BLD-MCNC: 8.1 G/DL
HGB UR QL STRIP: NEGATIVE
KETONES UR QL STRIP: NEGATIVE
LEUKOCYTE ESTERASE UR QL STRIP: ABNORMAL
LYMPHOCYTES # BLD AUTO: 1.4 K/UL
LYMPHOCYTES NFR BLD: 18 %
MAGNESIUM SERPL-MCNC: 2.4 MG/DL
MCH RBC QN AUTO: 31.3 PG
MCHC RBC AUTO-ENTMCNC: 33 G/DL
MCV RBC AUTO: 95 FL
MICROSCOPIC COMMENT: ABNORMAL
MONOCYTES # BLD AUTO: 0.6 K/UL
MONOCYTES NFR BLD: 7.5 %
NEUTROPHILS # BLD AUTO: 5.7 K/UL
NEUTROPHILS NFR BLD: 71.2 %
NITRITE UR QL STRIP: NEGATIVE
PH UR STRIP: 6 [PH] (ref 5–8)
PHOSPHATE SERPL-MCNC: 3.6 MG/DL
PLATELET # BLD AUTO: 172 K/UL
PMV BLD AUTO: 9 FL
POCT GLUCOSE: 139 MG/DL (ref 70–110)
POCT GLUCOSE: 210 MG/DL (ref 70–110)
POCT GLUCOSE: 229 MG/DL (ref 70–110)
POTASSIUM SERPL-SCNC: 3.7 MMOL/L
PROT UR QL STRIP: NEGATIVE
RBC # BLD AUTO: 2.59 M/UL
SODIUM SERPL-SCNC: 135 MMOL/L
SODIUM UR-SCNC: <20 MMOL/L
SP GR UR STRIP: <=1.005 (ref 1–1.03)
URN SPEC COLLECT METH UR: ABNORMAL
UROBILINOGEN UR STRIP-ACNC: NEGATIVE EU/DL
WBC # BLD AUTO: 8 K/UL
WBC #/AREA URNS HPF: 15 /HPF (ref 0–5)

## 2018-10-03 PROCEDURE — 96361 HYDRATE IV INFUSION ADD-ON: CPT

## 2018-10-03 PROCEDURE — 83036 HEMOGLOBIN GLYCOSYLATED A1C: CPT

## 2018-10-03 PROCEDURE — 81000 URINALYSIS NONAUTO W/SCOPE: CPT

## 2018-10-03 PROCEDURE — 27000221 HC OXYGEN, UP TO 24 HOURS

## 2018-10-03 PROCEDURE — 25000003 PHARM REV CODE 250: Performed by: NURSE PRACTITIONER

## 2018-10-03 PROCEDURE — 63600175 PHARM REV CODE 636 W HCPCS: Performed by: HOSPITALIST

## 2018-10-03 PROCEDURE — G8979 MOBILITY GOAL STATUS: HCPCS | Mod: CH

## 2018-10-03 PROCEDURE — 96365 THER/PROPH/DIAG IV INF INIT: CPT

## 2018-10-03 PROCEDURE — 87205 SMEAR GRAM STAIN: CPT

## 2018-10-03 PROCEDURE — 97530 THERAPEUTIC ACTIVITIES: CPT

## 2018-10-03 PROCEDURE — G8987 SELF CARE CURRENT STATUS: HCPCS | Mod: CK

## 2018-10-03 PROCEDURE — 97165 OT EVAL LOW COMPLEX 30 MIN: CPT

## 2018-10-03 PROCEDURE — G0378 HOSPITAL OBSERVATION PER HR: HCPCS

## 2018-10-03 PROCEDURE — 84300 ASSAY OF URINE SODIUM: CPT

## 2018-10-03 PROCEDURE — 84100 ASSAY OF PHOSPHORUS: CPT

## 2018-10-03 PROCEDURE — 25000242 PHARM REV CODE 250 ALT 637 W/ HCPCS: Performed by: HOSPITALIST

## 2018-10-03 PROCEDURE — 36415 COLL VENOUS BLD VENIPUNCTURE: CPT

## 2018-10-03 PROCEDURE — 94761 N-INVAS EAR/PLS OXIMETRY MLT: CPT

## 2018-10-03 PROCEDURE — 97161 PT EVAL LOW COMPLEX 20 MIN: CPT

## 2018-10-03 PROCEDURE — 63600175 PHARM REV CODE 636 W HCPCS: Performed by: NURSE PRACTITIONER

## 2018-10-03 PROCEDURE — 94640 AIRWAY INHALATION TREATMENT: CPT

## 2018-10-03 PROCEDURE — G8988 SELF CARE GOAL STATUS: HCPCS | Mod: CI

## 2018-10-03 PROCEDURE — 82570 ASSAY OF URINE CREATININE: CPT

## 2018-10-03 PROCEDURE — 87086 URINE CULTURE/COLONY COUNT: CPT

## 2018-10-03 PROCEDURE — 25000242 PHARM REV CODE 250 ALT 637 W/ HCPCS: Performed by: NURSE PRACTITIONER

## 2018-10-03 PROCEDURE — 96360 HYDRATION IV INFUSION INIT: CPT

## 2018-10-03 PROCEDURE — 96366 THER/PROPH/DIAG IV INF ADDON: CPT

## 2018-10-03 PROCEDURE — 82962 GLUCOSE BLOOD TEST: CPT

## 2018-10-03 PROCEDURE — 97116 GAIT TRAINING THERAPY: CPT

## 2018-10-03 PROCEDURE — 83735 ASSAY OF MAGNESIUM: CPT

## 2018-10-03 PROCEDURE — G8978 MOBILITY CURRENT STATUS: HCPCS | Mod: CJ

## 2018-10-03 PROCEDURE — 25000003 PHARM REV CODE 250: Performed by: HOSPITALIST

## 2018-10-03 PROCEDURE — 80048 BASIC METABOLIC PNL TOTAL CA: CPT

## 2018-10-03 PROCEDURE — 85025 COMPLETE CBC W/AUTO DIFF WBC: CPT

## 2018-10-03 PROCEDURE — 96372 THER/PROPH/DIAG INJ SC/IM: CPT

## 2018-10-03 RX ORDER — CIPROFLOXACIN 500 MG/1
500 TABLET ORAL EVERY 12 HOURS
Status: DISCONTINUED | OUTPATIENT
Start: 2018-10-03 | End: 2018-10-04

## 2018-10-03 RX ORDER — METRONIDAZOLE 250 MG/1
250 TABLET ORAL EVERY 8 HOURS
Status: DISCONTINUED | OUTPATIENT
Start: 2018-10-03 | End: 2018-10-04 | Stop reason: HOSPADM

## 2018-10-03 RX ORDER — IPRATROPIUM BROMIDE AND ALBUTEROL SULFATE 2.5; .5 MG/3ML; MG/3ML
3 SOLUTION RESPIRATORY (INHALATION)
Status: DISCONTINUED | OUTPATIENT
Start: 2018-10-03 | End: 2018-10-04 | Stop reason: HOSPADM

## 2018-10-03 RX ORDER — LEVOTHYROXINE SODIUM 88 UG/1
88 TABLET ORAL
Status: DISCONTINUED | OUTPATIENT
Start: 2018-10-03 | End: 2018-10-04 | Stop reason: HOSPADM

## 2018-10-03 RX ORDER — MONTELUKAST SODIUM 10 MG/1
10 TABLET ORAL NIGHTLY
Status: DISCONTINUED | OUTPATIENT
Start: 2018-10-03 | End: 2018-10-04 | Stop reason: HOSPADM

## 2018-10-03 RX ORDER — DOCUSATE SODIUM 100 MG/1
100 CAPSULE, LIQUID FILLED ORAL 2 TIMES DAILY
Status: DISCONTINUED | OUTPATIENT
Start: 2018-10-03 | End: 2018-10-04 | Stop reason: HOSPADM

## 2018-10-03 RX ORDER — FLUTICASONE PROPIONATE 50 MCG
2 SPRAY, SUSPENSION (ML) NASAL DAILY
Status: DISCONTINUED | OUTPATIENT
Start: 2018-10-03 | End: 2018-10-04 | Stop reason: HOSPADM

## 2018-10-03 RX ORDER — GABAPENTIN 300 MG/1
300 CAPSULE ORAL NIGHTLY
Status: DISCONTINUED | OUTPATIENT
Start: 2018-10-03 | End: 2018-10-04 | Stop reason: HOSPADM

## 2018-10-03 RX ORDER — SIMVASTATIN 40 MG/1
40 TABLET, FILM COATED ORAL NIGHTLY
Status: DISCONTINUED | OUTPATIENT
Start: 2018-10-03 | End: 2018-10-04 | Stop reason: HOSPADM

## 2018-10-03 RX ORDER — LANOLIN ALCOHOL/MO/W.PET/CERES
400 CREAM (GRAM) TOPICAL DAILY
Status: DISCONTINUED | OUTPATIENT
Start: 2018-10-03 | End: 2018-10-04 | Stop reason: HOSPADM

## 2018-10-03 RX ORDER — FERROUS SULFATE 325(65) MG
325 TABLET, DELAYED RELEASE (ENTERIC COATED) ORAL 2 TIMES DAILY
Status: DISCONTINUED | OUTPATIENT
Start: 2018-10-03 | End: 2018-10-04 | Stop reason: HOSPADM

## 2018-10-03 RX ORDER — CYANOCOBALAMIN 1000 UG/ML
1000 INJECTION, SOLUTION INTRAMUSCULAR; SUBCUTANEOUS ONCE
Status: COMPLETED | OUTPATIENT
Start: 2018-10-03 | End: 2018-10-03

## 2018-10-03 RX ORDER — SERTRALINE HYDROCHLORIDE 25 MG/1
25 TABLET, FILM COATED ORAL DAILY
Status: DISCONTINUED | OUTPATIENT
Start: 2018-10-03 | End: 2018-10-04 | Stop reason: HOSPADM

## 2018-10-03 RX ORDER — IPRATROPIUM BROMIDE AND ALBUTEROL SULFATE 2.5; .5 MG/3ML; MG/3ML
3 SOLUTION RESPIRATORY (INHALATION)
Status: DISCONTINUED | OUTPATIENT
Start: 2018-10-03 | End: 2018-10-03 | Stop reason: SDUPTHER

## 2018-10-03 RX ORDER — FLUTICASONE FUROATE AND VILANTEROL 100; 25 UG/1; UG/1
1 POWDER RESPIRATORY (INHALATION) DAILY
Status: DISCONTINUED | OUTPATIENT
Start: 2018-10-03 | End: 2018-10-04 | Stop reason: HOSPADM

## 2018-10-03 RX ORDER — ASCORBIC ACID 500 MG
500 TABLET ORAL DAILY
Status: DISCONTINUED | OUTPATIENT
Start: 2018-10-03 | End: 2018-10-04 | Stop reason: HOSPADM

## 2018-10-03 RX ORDER — ASPIRIN 81 MG/1
81 TABLET ORAL DAILY
Status: DISCONTINUED | OUTPATIENT
Start: 2018-10-03 | End: 2018-10-04 | Stop reason: HOSPADM

## 2018-10-03 RX ADMIN — FERROUS SULFATE TAB EC 325 MG (65 MG FE EQUIVALENT) 325 MG: 325 (65 FE) TABLET DELAYED RESPONSE at 08:10

## 2018-10-03 RX ADMIN — PIPERACILLIN SODIUM AND TAZOBACTAM SODIUM 2.25 G: 2; .25 INJECTION, POWDER, FOR SOLUTION INTRAVENOUS at 10:10

## 2018-10-03 RX ADMIN — SODIUM CHLORIDE: 0.9 INJECTION, SOLUTION INTRAVENOUS at 02:10

## 2018-10-03 RX ADMIN — Medication 400 MG: at 10:10

## 2018-10-03 RX ADMIN — DOCUSATE SODIUM 100 MG: 100 CAPSULE, LIQUID FILLED ORAL at 08:10

## 2018-10-03 RX ADMIN — LEVOTHYROXINE SODIUM 88 MCG: 88 TABLET ORAL at 05:10

## 2018-10-03 RX ADMIN — PIPERACILLIN SODIUM AND TAZOBACTAM SODIUM 2.25 G: 2; .25 INJECTION, POWDER, FOR SOLUTION INTRAVENOUS at 12:10

## 2018-10-03 RX ADMIN — IPRATROPIUM BROMIDE AND ALBUTEROL SULFATE 3 ML: .5; 3 SOLUTION RESPIRATORY (INHALATION) at 07:10

## 2018-10-03 RX ADMIN — ASPIRIN 81 MG: 81 TABLET, COATED ORAL at 10:10

## 2018-10-03 RX ADMIN — FLUTICASONE FUROATE AND VILANTEROL TRIFENATATE 1 PUFF: 100; 25 POWDER RESPIRATORY (INHALATION) at 07:10

## 2018-10-03 RX ADMIN — GABAPENTIN 300 MG: 300 CAPSULE ORAL at 08:10

## 2018-10-03 RX ADMIN — APIXABAN 5 MG: 2.5 TABLET, FILM COATED ORAL at 10:10

## 2018-10-03 RX ADMIN — INSULIN ASPART 2 UNITS: 100 INJECTION, SOLUTION INTRAVENOUS; SUBCUTANEOUS at 12:10

## 2018-10-03 RX ADMIN — GABAPENTIN 300 MG: 300 CAPSULE ORAL at 12:10

## 2018-10-03 RX ADMIN — MONTELUKAST SODIUM 10 MG: 10 TABLET, FILM COATED ORAL at 12:10

## 2018-10-03 RX ADMIN — APIXABAN 5 MG: 2.5 TABLET, FILM COATED ORAL at 08:10

## 2018-10-03 RX ADMIN — SIMVASTATIN 40 MG: 40 TABLET, FILM COATED ORAL at 08:10

## 2018-10-03 RX ADMIN — IPRATROPIUM BROMIDE AND ALBUTEROL SULFATE 3 ML: .5; 3 SOLUTION RESPIRATORY (INHALATION) at 01:10

## 2018-10-03 RX ADMIN — CYANOCOBALAMIN 1000 MCG: 1000 INJECTION, SOLUTION INTRAMUSCULAR; SUBCUTANEOUS at 10:10

## 2018-10-03 RX ADMIN — METRONIDAZOLE 250 MG: 250 TABLET ORAL at 09:10

## 2018-10-03 RX ADMIN — OXYCODONE HYDROCHLORIDE AND ACETAMINOPHEN 500 MG: 500 TABLET ORAL at 10:10

## 2018-10-03 RX ADMIN — CIPROFLOXACIN HYDROCHLORIDE 500 MG: 500 TABLET, FILM COATED ORAL at 08:10

## 2018-10-03 RX ADMIN — APIXABAN 5 MG: 2.5 TABLET, FILM COATED ORAL at 12:10

## 2018-10-03 RX ADMIN — SERTRALINE 25 MG: 25 TABLET, FILM COATED ORAL at 10:10

## 2018-10-03 RX ADMIN — SIMVASTATIN 40 MG: 40 TABLET, FILM COATED ORAL at 12:10

## 2018-10-03 RX ADMIN — INSULIN ASPART 1 UNITS: 100 INJECTION, SOLUTION INTRAVENOUS; SUBCUTANEOUS at 08:10

## 2018-10-03 RX ADMIN — FERROUS SULFATE TAB EC 325 MG (65 MG FE EQUIVALENT) 325 MG: 325 (65 FE) TABLET DELAYED RESPONSE at 10:10

## 2018-10-03 RX ADMIN — MONTELUKAST SODIUM 10 MG: 10 TABLET, FILM COATED ORAL at 08:10

## 2018-10-03 NOTE — ASSESSMENT & PLAN NOTE
Per CT Scan: Left colon thickening and pericolic fat stranding consistent with nonspecific diffuse infectious versus inflammatory versus developing ischemia   Change Iv Zosyn to po cipro and flagyl

## 2018-10-03 NOTE — PLAN OF CARE
Problem: Occupational Therapy Goal  Goal: Occupational Therapy Goal  Goals to be met by: 10/10/2018     Patient will increase functional independence with ADLs by performing:    Toilet transfer to toilet with [facility].    OT POC initiated and established in collaboration with patient.

## 2018-10-03 NOTE — TELEPHONE ENCOUNTER
----- Message from Jayesh Mac sent at 10/3/2018 11:37 AM CDT -----  Type: Needs Medical Advice    Who Called:  Daughter/Natalie    Best Call Back Number: [phone] (Available til 3:30).  Patient has her cell phone with her in the hospital  [phone]  Additional Information: Daughter calling.  States that the patient is hospitalized in [facility] and won't be able to make her 2:30 appointment on 10/03.  Caller also would like Dr. Trevino to please see her in the hospital if possible   Please call to advise.

## 2018-10-03 NOTE — ASSESSMENT & PLAN NOTE
Stable.  Continue iron supplementation   Monitor labs  Transfuse if Hgb <8.0 (CHF Hx) or symptomatic  Add sq vitamin B12 x 1 dose

## 2018-10-03 NOTE — PLAN OF CARE
Problem: Patient Care Overview  Goal: Plan of Care Review  Outcome: Ongoing (interventions implemented as appropriate)  Pt on room air with 92% sats oxygen on standby, and Q6 while awake duoneb treatments.

## 2018-10-03 NOTE — ASSESSMENT & PLAN NOTE
Stable.  Continue iron supplementation   Monitor labs  Transfuse if Hgb <8.0 (CHF Hx) or symptomatic

## 2018-10-03 NOTE — HPI
This  is a 88 y.o. female with PMHx of hypertension, DM type 2, hyperlipidemia, COPD, hypothyroidism, and pulmonary emboli on Apixaban. She was recently hospitalized 9/13-9/16 for SHALINI, dehydration, and hypotension. She was discharged to home with home health and presented to the ED yesterday with complaints of abdominal pain, nausea, and vomiting. She was diagnosed with dehydration and constipation. She was treated with Magnesium Citrate, 500cc NS bolus, and her lasix was decreased. She returns to the ED today with complaints of hypotension. Daughter reports that her systolic blood pressure was in the low 90's at home. She has been having nausea and dry heaving today, but was able to drink fluids. While in the ED, she was found to have abdominal pain and an abdominal CT was done and results are pending. Her BP is stable but she was found to have some mild worsening renal function. She currently denies pain or discomforts. Her daughter is at bedside. Patient was placed in the hospital for further evaluation and treatment.

## 2018-10-03 NOTE — ASSESSMENT & PLAN NOTE
Gentle IVF Hydration due to CHF  Spot urine Na and Creat, Apple's stain  Avoid NSAIDs/Nephrotoxic agents  Renal dose medications  Renal diet.

## 2018-10-03 NOTE — ASSESSMENT & PLAN NOTE
Per CT Scan: Left colon thickening and pericolic fat stranding consistent with nonspecific diffuse infectious versus inflammatory versus developing ischemia   Empiric Zosyn  Obtain Lactic acid and CRP level

## 2018-10-03 NOTE — ASSESSMENT & PLAN NOTE
Not currently on chronic medications  Monitor blood sugars AC&HS with [facility]  Diabetic diet  Check HGA1C  Blood sugars running 139-229

## 2018-10-03 NOTE — H&P
[facility] Hospital Medicine  History & Physical    Patient Name: Blaire Cage  MRN: [id number]  Admission Date: 10/2/2018  Attending Physician: Venancio Zamarripa MD   Primary Care Provider: Eli Edmonds MD         Patient information was obtained from patient, relative(s), past medical records and ER records.     Subjective:     Principal Problem:Colitis, acute    Chief Complaint:   Chief Complaint   Patient presents with    Hypotension        HPI: Ms. Cage is a 88 y.o. female with PMH of hypertension, DM type 2, hyperlipidemia, COPD, hypothyroidism, and pulmonary emboli on Apixaban. She was recently hospitalized 9/13-9/16 for SHALINI, dehydration, and hypotension. She was discharged to home with home health and presented to the ED yesterday with c/o abdominal pain and N&V. She was diagnosed with dehydration and constipation. She was treated with Magnesium Citrate, 500cc NS bolus, and her lasix was decreased. She returns to the ED today with c/o hypotension. Daughter reports that her BP was low 90's at home, although earlier today, her NP was 130's. She has been having nausea and dry heaving today, but was able to drink fluids. While in the ED, she was found to have abdominal pain and an abdominal CT was done and results are pending. Her BP is stable but she was found to have some mild worsening renal function. She currently denies pain or discomforts. Her daughter is at bedside.        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 8/7/2018    Hip arthritis 3/1/2016     Homocysteinemia 8/7/2018    Hyperlipidemia     Hypertension     Normocytic normochromic anemia 6/29/2018    PE (pulmonary thromboembolism) 06/09/2018    Pneumonia of right lower lobe due to infectious organism 9/11/2017    Thyroid disease     hypothyroid       Past Surgical History:   Procedure Laterality Date    APPENDECTOMY      ESOPHAGOGASTRODUODENOSCOPY (EGD) N/A 10/25/2017    Performed by [facility] at [facility] ENDO    FRACTURE SURGERY      right hip     HERNIA REPAIR      groin    HYSTERECTOMY      VARICOSE VEIN SURGERY         Review of patient's allergies indicates:   Allergen Reactions    Carvedilol Other (See Comments)     Bradycardia and syncope    Boniva [ibandronate]     Codeine     Hydralazine analogues     Iodinated contrast- oral and iv dye Hives    Kionex [sodium polystyrene sulfonate] Diarrhea     From encounter 12/11/17 for diarrhea--not true allergy.    Lisinopril     Morphine        No current facility-administered medications on file prior to encounter.      Current Outpatient Medications on File Prior to Encounter   Medication Sig    albuterol-ipratropium (DUO-NEB) 2.5 mg-0.5 mg/3 mL nebulizer solution USE ONE VIAL IN NEBULIZER EVERY 6 HOURS WHILE AWAKE    amLODIPine (NORVASC) 5 MG tablet Take 1 tablet (5 mg total) by mouth once daily.    apixaban 5 mg Tab Take 1 tablet (5 mg total) by mouth 2 (two) times daily.    ascorbic acid (VITAMIN C) 500 MG tablet Take 500 mg by mouth once daily.      aspirin (ECOTRIN) 81 MG EC tablet Take 81 mg by mouth once daily.      calcium carbonate (OS-TASIA) 500 mg calcium (1,250 mg) tablet Take 1 tablet by mouth 2 (two) times daily.      ferrous sulfate 325 mg (65 mg iron) Tab tablet Take 1 tablet (325 mg total) by mouth 2 (two) times daily.    fish oil-omega-3 fatty acids 300-1,000 mg capsule Take 2 g by mouth every evening.     fluticasone (FLONASE) 50 mcg/actuation nasal spray 2 sprays by Each Nare route once daily.     fluticasone-vilanterol (BREO) 100-25 mcg/dose diskus inhaler Inhale 1 puff into the lungs once daily. Controller    folic acid-vit B6-vit B12 2.5-25-2 mg (FOLBIC OR EQUIV) 2.5-25-2 mg Tab Take 1 tablet by mouth once daily.    furosemide (LASIX) 40 MG tablet Take 0.5 tablets (20 mg total) by mouth 2 (two) times daily.    gabapentin (NEURONTIN) 300 MG capsule Take 1 capsule (300 mg total) by mouth every evening.    guanFACINE (TENEX) 1 MG Tab TAKE 1 TABLET BY MOUTH IN THE EVENING    levothyroxine (SYNTHROID) 100 MCG tablet Take 1 tablet (100 mcg total) by mouth before breakfast.    magnesium oxide (MAG-OX) 400 mg tablet TAKE ONE TABLET BY MOUTH ONCE DAILY    montelukast (SINGULAIR) 10 mg tablet Take 1 tablet (10 mg total) by mouth every evening.    nitroGLYCERIN (NITROSTAT) 0.4 MG SL tablet Place 1 tablet (0.4 mg total) under the tongue every 5 (five) minutes as needed for Chest pain. As needed (Patient taking differently: Place 0.4 mg under the tongue every 5 (five) minutes as needed for Chest pain. Seek medical help if pain is not relieved after the third dose.)    sertraline (ZOLOFT) 25 MG tablet Take 1 tablet (25 mg total) by mouth once daily.    simvastatin (ZOCOR) 40 MG tablet TAKE ONE TABLET BY MOUTH ONCE DAILY IN THE EVENING    vitamin D 1000 units Tab Take 1 tablet (1,000 Units total) by mouth once daily.    [DISCONTINUED] cloNIDine (CATAPRES) 0.1 MG tablet Take 1 tablet (0.1 mg total) by mouth 2 (two) times daily. Take one tablet by mouth every hour as needed for systolic blood pressure greater than 180.  Do not take more than 3 tablets in 24 hours.    [DISCONTINUED] ferrous gluconate (FERGON) 324 MG tablet Take 1 tablet (324 mg total) by mouth daily with breakfast.    [DISCONTINUED] magnesium citrate solution Take 296 mLs by mouth once. for 1 dose    [DISCONTINUED] traMADol (ULTRAM) 50 mg tablet Take 50 mg by mouth every 6 (six) hours as needed for Pain.     Family History     Problem  Relation (Age of Onset)    Diabetes Sister, Brother    Heart disease Mother    Hypertension Mother    Kidney disease Son    Lung disease Father        Tobacco Use    Smoking status: Former Smoker     Packs/day: 0.35     Years: 44.00     Pack years: 15.40     Types: Cigarettes     Last attempt to quit: 4/30/2015     Years since quitting: 3.4    Smokeless tobacco: Never Used    Tobacco comment: 1/08/2015   Substance and Sexual Activity    Alcohol use: No     Alcohol/week: 0.0 oz    Drug use: No    Sexual activity: No     Review of Systems   Constitutional: Positive for fatigue. Negative for chills and fever.   HENT: Negative for congestion.    Eyes: Negative for visual disturbance.   Respiratory: Negative for cough and shortness of breath.    Cardiovascular: Negative for chest pain and palpitations.   Gastrointestinal: Positive for abdominal pain and nausea. Negative for constipation, diarrhea and vomiting.   Genitourinary: Negative for dysuria and hematuria.   Musculoskeletal: Negative for arthralgias.   Skin: Negative for wound.   Neurological: Negative for dizziness, syncope and light-headedness.   Hematological: Bruises/bleeds easily.        On Apixaban   Psychiatric/Behavioral: Negative for confusion and hallucinations.     Objective:     Vital Signs (Most Recent):  Temp: 98.3 °F (36.8 °C) (10/02/18 1801)  Pulse: (!) 55 (10/02/18 2131)  Resp: 14 (10/02/18 1801)  BP: (!) 113/54 (10/02/18 2131)  SpO2: 95 % (10/02/18 2131) Vital Signs (24h Range):  Temp:  [98.3 °F (36.8 °C)] 98.3 °F (36.8 °C)  Pulse:  [] 55  Resp:  [14] 14  SpO2:  [95 %-100 %] 95 %  BP: (102-139)/(52-67) 113/54     Weight: 59 kg (130 lb 1.1 oz)  Body mass index is 23.04 kg/m².    Physical Exam   Constitutional: No distress.   Frail   HENT:   Head: Normocephalic.   Eyes: Pupils are equal, round, and reactive to light.   Neck: Normal range of motion. Neck supple. No JVD present. No tracheal deviation present.   Cardiovascular: Normal rate,  regular rhythm, normal heart sounds and intact distal pulses.   No murmur heard.  Pulmonary/Chest: Effort normal and breath sounds normal. No respiratory distress.   Abdominal: Soft. Bowel sounds are normal. She exhibits no distension. There is no tenderness. There is no guarding.   Musculoskeletal: Normal range of motion. She exhibits no edema.   Generalized weakness         CRANIAL NERVES     CN III, IV, VI   Pupils are equal, round, and reactive to light.       Significant Labs:   CBC:   Recent Labs   Lab  10/01/18   1815   WBC  9.90   HGB  9.8*   HCT  29.2*   PLT  229     CMP:   Recent Labs   Lab  10/01/18   1815  10/02/18   1915   NA  133*  130*   K  3.9  4.1   CL  95  94*   CO2  25  26   GLU  111*  159*   BUN  55*  57*   CREATININE  2.5*  2.6*   CALCIUM  9.0  8.5*   PROT  6.3  5.5*   ALBUMIN  3.3*  2.9*   BILITOT  0.4  0.4   ALKPHOS  75  70   AST  34  52*   ALT  40  38   ANIONGAP  13  10   EGFRNONAA  17*  16*     Urine Studies:   Recent Labs   Lab  10/01/18   1909   COLORU  Yellow   APPEARANCEUA  Clear   PHUR  6.0   SPECGRAV  1.010   PROTEINUA  Negative   GLUCUA  Negative   KETONESU  Negative   BILIRUBINUA  Negative   OCCULTUA  Negative   NITRITE  Negative   UROBILINOGEN  Negative   LEUKOCYTESUR  Trace*   WBCUA  2   BACTERIA  Moderate*   HYALINECASTS  3*       Significant Imaging:     CT Abd/Pelvis w/o Contrast: Reviewed Radiologist's report-- 1) Left colon thickening and pericolic fat stranding consistent with nonspecific diffuse infectious versus inflammatory versus developing ischemia 2) Distal colon diverticulosis without CY evidence of diverticulitis. 3) 4.1cm AP diameter infrarenal distal abdominal aortic aneurysm, previously 3.9cm. 4) Stable small incidental cysts, cystic lesions or focal ductal dilatation along the pancreatic head and uncinate process. 5) Bilateral nephrolithiasis 6) Previous cholecystectomy and hysterectomy 7) Small hiatal hernia versus mild lower esophageal  thickening/esophagitis    Abdominal Flat&Erect 10/1/18: Reviewed radiologist's report-   Impression     No acute process.  Possible bilateral nephrolithiasis.  Postoperative change.         Assessment/Plan:     * Colitis, acute    Per CT Scan: Left colon thickening and pericolic fat stranding consistent with nonspecific diffuse infectious versus inflammatory versus developing ischemia   Empiric Zosyn  Obtain Lactic acid and CRP level          Acute kidney injury superimposed on chronic kidney disease 3    Gentle IVF Hydration due to CHF  Spot urine Na and Creat, Apple's stain  Avoid NSAIDs/Nephrotoxic agents  Renal dose medications  Renal diet.          Hypertension associated with diabetes    Chronic/Unstable--fluctuates between hypotension and normopressure  Hold chronic medications for now  Monitor VS          Anemia of chronic disease    Stable.  Continue iron supplementation   Monitor labs  Transfuse if Hgb <8.0 (CHF Hx) or symptomatic          History of pulmonary embolism    Continue Apixaban          Uncontrolled type 2 diabetes mellitus with kidney complication, without long-term current use of insulin    Not currently on chronic medications  Monitor blood sugars QAC&HS  SSi prn  Diabetic diet          Left ventricular diastolic dysfunction with preserved systolic function    Chronic/Stable.  Hold diuretic due to SHALINI  Not on chronic ACEI/ARB, likley due to renal dysfunction  Not on chronic BB  Monitor closely for volume overload while being gently hydrated for SHALINI  Weigh daily  Monitor on telemetry  Last TTE 4/15/19: Normal ejection fraction . EF>55% Moderate concentric hypertrophy observed. Grade I (mild) left ventricular diastolic dysfunction.        Abdominal aortic aneurysm without rupture    Stable  Maintain adequate BP management          COPD (chronic obstructive pulmonary disease) with acute bronchitis    Stable.   Nebulizers/O2 prn          Hypothyroidism    Lab Results   Component Value Date     TSH 0.189 (L) 09/13/2018   Hyperthyroid  Decrease Levothyroxine to 88mcg daily            VTE Risk Mitigation (From admission, onward)        Ordered     apixaban tablet 5 mg  2 times daily      10/03/18 0015     IP VTE HIGH RISK PATIENT  Once      10/02/18 2226     Reason for No Pharmacological VTE Prophylaxis  Once      10/02/18 2226             Eirka Maldonado NP  Department of Hospital Medicine   [facility]

## 2018-10-03 NOTE — PT/OT/SLP EVAL
"Physical Therapy Evaluation    Patient Name:  Blaire Cage   MRN:  [id number]    Recommendations:     Discharge Recommendations:  home with home health   Discharge Equipment Recommendations: none   Barriers to discharge: None    Assessment:     Blaire Cage is a 88 y.o. female admitted with a medical diagnosis of Colitis, acute.  She presents with the following impairments/functional limitations:  impaired balance, pain , anxiety..    Rehab Prognosis:  Good; patient would benefit from acute skilled PT services to address these deficits and reach maximum level of function.      Recent Surgery: * No surgery found *      Plan:     During this hospitalization, patient to be seen 5 x/week to address the above listed problems via gait training, therapeutic activities, therapeutic exercises  · Plan of Care Expires:  10/10/18   Plan of Care Reviewed with: patient    Subjective     Communicated with nurse prior to session.  Patient found supine in bed upon PT entry to room, agreeable to evaluation.      Chief Complaint: "I'm overly cautious because I'm afraid to fall." Pain to left heel with weight bearing/walking.  Patient comments/goals: Patient wants to go home. " Im tired of being in the hospital."  Pain/Comfort:  · Pain Rating 1: 3/10  · Location - Side 1: Left  · Location 1: heel  · Pain Addressed 1: Cessation of Activity, Distraction  · Pain Rating Post-Intervention 1: 0/10    Patients cultural, spiritual, Religion conflicts given the current situation:      Living Environment:  Lives alone. No stairs. One small threshold.  Prior to admission, patients level of function was independent.  Patient has the following equipment: oxygen.  DME owned (not currently used): rolling walker.  Upon discharge, patient will have assistance from daughter who lives nearby..    Objective:     Patient found with: peripheral IV, oxygen     General Precautions: Standard, fall   Orthopedic Precautions:N/A   Braces: N/A "     Exams:  · Cognitive Exam:  Patient is oriented to Person, Place, Time and Situation  · Gross Motor Coordination:  WFL  · RUE ROM: WFL  · RUE Strength: WFL  · LUE ROM: WFL  · LUE Strength: WFL  · RLE ROM: WFL  · RLE Strength: WFL  · LLE ROM: WFL  · LLE Strength: WFL    Functional Mobility:  · Bed Mobility:     · Supine to Sit: modified independence  · Sit to Supine: modified independence  · Transfers:     · Sit to Stand:  modified independence with no AD  · Bed to Chair: modified independence with  no AD  using  Stand Pivot  · Gait: CGA with initial use of IV pole to get started with ambulation.  · Balance: Fair.    AM-PAC 6 CLICK MOBILITY  Total Score:22       Therapeutic Activities and Exercises:   Transfers, bed mobility, sit to stand, balance and gait training.    Patient left supine with call button in reach.    GOALS:   Multidisciplinary Problems     Physical Therapy Goals        Problem: Physical Therapy Goal    Goal Priority Disciplines Outcome Goal Variances Interventions   Physical Therapy Goal     PT, PT/OT Ongoing (interventions implemented as appropriate)                     History:     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 8/7/2018    Hip arthritis 3/1/2016    Homocysteinemia 8/7/2018    Hyperlipidemia     Hypertension     Normocytic normochromic anemia 6/29/2018    PE (pulmonary thromboembolism) 06/09/2018    Pneumonia of right lower lobe due to infectious organism 9/11/2017    Thyroid disease      hypothyroid       Past Surgical History:   Procedure Laterality Date    APPENDECTOMY      CHOLECYSTECTOMY      ESOPHAGOGASTRODUODENOSCOPY (EGD) N/A 10/25/2017    Performed by Fadi Flores MD at [facility] ENDO    FRACTURE SURGERY      right hip     HERNIA REPAIR      groin    HYSTERECTOMY      VARICOSE VEIN SURGERY         Clinical Decision Making:     History  Co-morbidities and personal factors that may impact the plan of care Examination  Body Structures and Functions, activity limitations and participation restrictions that may impact the plan of care Clinical Presentation   Decision Making/ Complexity Score   Co-morbidities:   [] Time since onset of injury / illness / exacerbation  [] Status of current condition  []Patient's cognitive status and safety concerns    [] Multiple Medical Problems (see med hx)  Personal Factors:   [] Patient's age  [] Prior Level of function   [] Patient's home situation (environment and family support)  [] Patient's level of motivation  [] Expected progression of patient      HISTORY:(criteria)    [] 89741 - no personal factors/history    [] 92409 - has 1-2 personal factor/comorbidity     [] 91184 - has >3 personal factor/comorbidity     Body Regions:  [] Objective examination findings  [] Head     []  Neck  [] Trunk   [] Upper Extremity  [] Lower Extremity    Body Systems:  [] For communication ability, affect, cognition, language, and learning style: the assessment of the ability to make needs known, consciousness, orientation (person, place, and time), expected emotional /behavioral responses, and learning preferences (eg, learning barriers, education  needs)  [] For the neuromuscular system: a general assessment of gross coordinated movement (eg, balance, gait, locomotion, transfers, and transitions) and motor function  (motor control and motor learning)  [] For the musculoskeletal system: the assessment of gross symmetry, gross range of motion, gross strength, height,  and weight  [] For the integumentary system: the assessment of pliability(texture), presence of scar formation, skin color, and skin integrity  [] For cardiovascular/pulmonary system: the assessment of heart rate, respiratory rate, blood pressure, and edema     Activity limitations:    [] Patient's cognitive status and saf ety concerns          [] Status of current condition      [] Weight bearing restriction  [] Cardiopulmunary Restriction    Participation Restrictions:   [] Goals and goal agreement with the patient     [] Rehab potential (prognosis) and probable outcome      Examination of Body System: (criteria)    [] 10460 - addressing 1-2 elements    [] 61461 - addressing a total of 3 or more elements     [] 89918 -  Addressing a total of 4 or more elements         Clinical Presentation: (criteria)  Choose one     On examination of body system using standardized tests and measures patient presents with (CHOOSE ONE) elements from any of the following: body structures and functions, activity limitations, and/or participation restrictions.  Leading to a clinical presentation that is considered (CHOOSE ONE)                              Clinical Decision Making  (Eval Complexity):  Choose One     Time Tracking:     PT Received On: 10/03/18  PT Start Time: 1024     PT Stop Time: 1050  PT Total Time (min): 26 min     Billable Minutes: Evaluation 11 and Gait Training 15      Reynaldo Ricardo, PT  10/03/2018

## 2018-10-03 NOTE — PT/OT/SLP EVAL
Occupational Therapy   Evaluation    Name: Blaire Cage  MRN: [id number]  Admitting Diagnosis:  Colitis, acute      Recommendations:     Discharge Recommendations: home with home health  Discharge Equipment Recommendations:  none  Barriers to discharge:  None    History:     Occupational Profile:  Living Environment: Pt lives alone in [facility] with small step to enter and step over tub/shower combo  Previous level of function: Modified Independent to Independent using shower chair, RW  Roles and Routines: Pt cooks and cleans her house and previously driving; pt's daughters bring her to appointments and assist pt as needed   Equipment Used at Home:  glucometer, oxygen, nebulizer, walker, rolling  Assistance upon Discharge: Assistance from daughters as needed     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 8/7/2018    Hip arthritis 3/1/2016    Homocysteinemia 8/7/2018    Hyperlipidemia     Hypertension     Normocytic normochromic anemia 6/29/2018    PE (pulmonary thromboembolism) 06/09/2018    Pneumonia of right lower lobe due to infectious organism 9/11/2017    Thyroid disease     hypothyroid       Past Surgical History:   Procedure Laterality Date    APPENDECTOMY      CHOLECYSTECTOMY      ESOPHAGOGASTRODUODENOSCOPY (EGD) N/A 10/25/2017    Performed by [facility] at [facility] ENDO    FRACTURE SURGERY      right hip     HERNIA REPAIR      groin    HYSTERECTOMY      VARICOSE VEIN SURGERY         Subjective     Chief  "Complaint: "I just want to know why I keep ending up in the hospital and why this keeps happening. It has gotten to the point that I am afraid to leave my house because I don't know when this is going to hit me and I am going to go down again. I don't really feel safe anywhere except my house in case something happens." Patient also reports that she has fear of falling and anxiety regarding ambulating because she was tripped on accident by one of her animals years ago and ever since she is overly cautious  Patient/Family Comments/goals: Patient reports that she would like to get rid of some of her fear of falling and anxiety regarding walking    Pain/Comfort:  · Pain Rating 1: 0/10    Patients cultural, spiritual, Yarsani conflicts given the current situation:      Objective:     Communicated with: nurse prior to session.  Patient found seated EOB with all lines intact and call button in reach and peripheral IV, oxygen(2 L/min) upon OT entry to room.    General Precautions: Standard, fall   Orthopedic Precautions:N/A   Braces: N/A     Occupational Performance:    Bed Mobility:    · Patient completed Supine to Sit with supervision    Functional Mobility/Transfers:  · Patient completed Sit <> Stand Transfer with contact guard assistance  with  no assistive device   · Patient completed Toilet Transfer Stand Pivot technique with contact guard assistance with  hand rail  · Functional Mobility: Patient ambulating with CGA and intermittent UE support from EOB > toilet > sink > EOB with no LOB and increased time     Activities of Daily Living:  · Feeding:  independence after set-up of lunch tray  · Grooming: supervision standing sink side x 3 mins alternating UE support of counter   · Toileting: supervision with use of grab bar as needed     Cognitive/Visual Perceptual:  Intact    Physical Exam:  Balance:    -       Mild impairment    AMPAC 6 Click ADL:  AMPAC Total Score: 20    Treatment & Education:  - OTR providing " "education regarding OT role/POC, Therapy Services as well as safety awareness, use of bed/wheelchair alarms, calling for assistance.  OTR providing education regarding correct transfer sequence and techniques with proper hand placement, use of DME/AE, adaptive techniques how to increase [place] with self-care tasks, fall risks and safety awareness - maintaining UE support of stationary objects only, recognizing limitations and energy conservation; pt expressing concerns regarding her medical stability and that she is almost afraid to leaver her house now - pt provided with recommendations from safety standpoint, accepting assistance from her daughters for certain activities/tasks, encouraged to speak with both nursing and doctor in order to receive an explanation of causes and prevention - pt verbalizes/demonstrates understanding and in agreement   Education:    Patient left seated EOB with all lines intact, call button in reach and nurse notified    Assessment:     Blaire Cage is a 88 y.o. female with a medical diagnosis of Colitis, acute.  She presents with the following performance deficits affecting function: weakness, impaired endurance, impaired self care skills, impaired functional mobilty, gait instability, impaired balance, pain.      Rehab Prognosis: Good; patient would benefit from acute skilled OT services to address these deficits and reach maximum level of function.         Clinical Decision Makin.  OT Low:  "Pt evaluation falls under low complexity for evaluation coding due to performance deficits noted in 1-3 areas as stated above and 0 co-morbities affecting current functional status. Data obtained from problem focused assessments. No modifications or assistance was required for completion of evaluation. Only brief occupational profile and history review completed."     Plan:     Patient to be seen 2 x/week to address the above listed problems via self-care/home management, " therapeutic activities, therapeutic exercises  · Plan of Care Expires: 10/05/18  · Plan of Care Reviewed with: patient    This Plan of care has been discussed with the patient who was involved in its development and understands and is in agreement with the identified goals and treatment plan    GOALS:   Multidisciplinary Problems     Occupational Therapy Goals        Problem: Occupational Therapy Goal    Goal Priority Disciplines Outcome Interventions   Occupational Therapy Goal     OT, PT/OT     Description:  Goals to be met by: 10/10/2018     Patient will increase functional independence with ADLs by performing:    Toilet transfer to toilet with [place].                      Time Tracking:     OT Date of Treatment: 10/03/18  OT Start Time: 1310  OT Stop Time: 1327  OT Total Time (min): 17 min    Billable Minutes:Evaluation 17    IMER Wilson LOTR  10/3/2018     G CODE for self care skills:  Current: CK  Goal: CI

## 2018-10-03 NOTE — PLAN OF CARE
Problem: Patient Care Overview  Goal: Plan of Care Review  Outcome: Ongoing (interventions implemented as appropriate)  Pt alert and oriented.vitals stable. No complaints. Family at bedside. Ambulatory,free from falls. Tele [id number], SR. Blood sugar monitored. Bed locked in low position. Call light in reach. Will continue to monitor.

## 2018-10-03 NOTE — SUBJECTIVE & OBJECTIVE
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 8/7/2018    Hip arthritis 3/1/2016    Homocysteinemia 8/7/2018    Hyperlipidemia     Hypertension     Normocytic normochromic anemia 6/29/2018    PE (pulmonary thromboembolism) 06/09/2018    Pneumonia of right lower lobe due to infectious organism 9/11/2017    Thyroid disease     hypothyroid       Past Surgical History:   Procedure Laterality Date    APPENDECTOMY      ESOPHAGOGASTRODUODENOSCOPY (EGD) N/A 10/25/2017    Per[facility] END[facility]    FRACTURE SURGERY      right hip     HERNIA REPAIR      groin    HYSTERECTOMY      VARICOSE VEIN SURGERY         Review of patient's allergies indicates:   Allergen Reactions    Carvedilol Other (See Comments)     Bradycardia and syncope    Boniva [ibandronate]     Codeine     Hydralazine analogues     Iodinated contrast- oral and iv dye Hives    Kionex [sodium polystyrene sulfonate] Diarrhea     From encounter 12/11/17 for diarrhea--not true allergy.    Lisinopril     Morphine        No current facility-administered medications on file prior to encounter.      Current Outpatient Medications on File Prior to Encounter   Medication Sig    albuterol-ipratropium (DUO-NEB) 2.5 mg-0.5 mg/3 mL nebulizer solution USE ONE VIAL IN NEBULIZER EVERY 6 HOURS WHILE AWAKE    amLODIPine (NORVASC) 5 MG tablet Take 1 tablet (5 mg total) by mouth once daily.    apixaban 5 mg Tab Take 1 tablet (5 mg total) by mouth 2 (two) times daily.    ascorbic acid (VITAMIN C) 500 MG tablet Take  500 mg by mouth once daily.      aspirin (ECOTRIN) 81 MG EC tablet Take 81 mg by mouth once daily.      calcium carbonate (OS-TASIA) 500 mg calcium (1,250 mg) tablet Take 1 tablet by mouth 2 (two) times daily.      ferrous sulfate 325 mg (65 mg iron) Tab tablet Take 1 tablet (325 mg total) by mouth 2 (two) times daily.    fish oil-omega-3 fatty acids 300-1,000 mg capsule Take 2 g by mouth every evening.     fluticasone (FLONASE) 50 mcg/actuation nasal spray 2 sprays by Each Nare route once daily.    fluticasone-vilanterol (BREO) 100-25 mcg/dose diskus inhaler Inhale 1 puff into the lungs once daily. Controller    folic acid-vit B6-vit B12 2.5-25-2 mg (FOLBIC OR EQUIV) 2.5-25-2 mg Tab Take 1 tablet by mouth once daily.    furosemide (LASIX) 40 MG tablet Take 0.5 tablets (20 mg total) by mouth 2 (two) times daily.    gabapentin (NEURONTIN) 300 MG capsule Take 1 capsule (300 mg total) by mouth every evening.    guanFACINE (TENEX) 1 MG Tab TAKE 1 TABLET BY MOUTH IN THE EVENING    levothyroxine (SYNTHROID) 100 MCG tablet Take 1 tablet (100 mcg total) by mouth before breakfast.    magnesium oxide (MAG-OX) 400 mg tablet TAKE ONE TABLET BY MOUTH ONCE DAILY    montelukast (SINGULAIR) 10 mg tablet Take 1 tablet (10 mg total) by mouth every evening.    nitroGLYCERIN (NITROSTAT) 0.4 MG SL tablet Place 1 tablet (0.4 mg total) under the tongue every 5 (five) minutes as needed for Chest pain. As needed (Patient taking differently: Place 0.4 mg under the tongue every 5 (five) minutes as needed for Chest pain. Seek medical help if pain is not relieved after the third dose.)    sertraline (ZOLOFT) 25 MG tablet Take 1 tablet (25 mg total) by mouth once daily.    simvastatin (ZOCOR) 40 MG tablet TAKE ONE TABLET BY MOUTH ONCE DAILY IN THE EVENING    vitamin D 1000 units Tab Take 1 tablet (1,000 Units total) by mouth once daily.    [DISCONTINUED] cloNIDine (CATAPRES) 0.1 MG tablet Take 1 tablet (0.1 mg total) by mouth 2  (two) times daily. Take one tablet by mouth every hour as needed for systolic blood pressure greater than 180.  Do not take more than 3 tablets in 24 hours.    [DISCONTINUED] ferrous gluconate (FERGON) 324 MG tablet Take 1 tablet (324 mg total) by mouth daily with breakfast.    [DISCONTINUED] magnesium citrate solution Take 296 mLs by mouth once. for 1 dose    [DISCONTINUED] traMADol (ULTRAM) 50 mg tablet Take 50 mg by mouth every 6 (six) hours as needed for Pain.     Family History     Problem Relation (Age of Onset)    Diabetes Sister, Brother    Heart disease Mother    Hypertension Mother    Kidney disease Son    Lung disease Father        Tobacco Use    Smoking status: Former Smoker     Packs/day: 0.35     Years: 44.00     Pack years: 15.40     Types: Cigarettes     Last attempt to quit: 4/30/2015     Years since quitting: 3.4    Smokeless tobacco: Never Used    Tobacco comment: 1/08/2015   Substance and Sexual Activity    Alcohol use: No     Alcohol/week: 0.0 oz    Drug use: No    Sexual activity: No     Review of Systems   Constitutional: Positive for fatigue. Negative for chills and fever.   HENT: Negative for congestion.    Eyes: Negative for visual disturbance.   Respiratory: Negative for cough and shortness of breath.    Cardiovascular: Negative for chest pain and palpitations.   Gastrointestinal: Positive for abdominal pain and nausea. Negative for constipation, diarrhea and vomiting.   Genitourinary: Negative for dysuria and hematuria.   Musculoskeletal: Negative for arthralgias.   Skin: Negative for wound.   Neurological: Negative for dizziness, syncope and light-headedness.   Hematological: Bruises/bleeds easily.        On Apixaban   Psychiatric/Behavioral: Negative for confusion and hallucinations.     Objective:     Vital Signs (Most Recent):  Temp: 98.3 °F (36.8 °C) (10/02/18 1801)  Pulse: (!) 55 (10/02/18 2131)  Resp: 14 (10/02/18 1801)  BP: (!) 113/54 (10/02/18 2131)  SpO2: 95 % (10/02/18  2131) Vital Signs (24h Range):  Temp:  [98.3 °F (36.8 °C)] 98.3 °F (36.8 °C)  Pulse:  [] 55  Resp:  [14] 14  SpO2:  [95 %-100 %] 95 %  BP: (102-139)/(52-67) 113/54     Weight: 59 kg (130 lb 1.1 oz)  Body mass index is 23.04 kg/m².    Physical Exam   Constitutional: No distress.   Frail   HENT:   Head: Normocephalic.   Eyes: Pupils are equal, round, and reactive to light.   Neck: Normal range of motion. Neck supple. No JVD present. No tracheal deviation present.   Cardiovascular: Normal rate, regular rhythm, normal heart sounds and intact distal pulses.   No murmur heard.  Pulmonary/Chest: Effort normal and breath sounds normal. No respiratory distress.   Abdominal: Soft. Bowel sounds are normal. She exhibits no distension. There is no tenderness. There is no guarding.   Musculoskeletal: Normal range of motion. She exhibits no edema.   Generalized weakness         CRANIAL NERVES     CN III, IV, VI   Pupils are equal, round, and reactive to light.       Significant Labs:   CBC:   Recent Labs   Lab  10/01/18   1815   WBC  9.90   HGB  9.8*   HCT  29.2*   PLT  229     CMP:   Recent Labs   Lab  10/01/18   1815  10/02/18   1915   NA  133*  130*   K  3.9  4.1   CL  95  94*   CO2  25  26   GLU  111*  159*   BUN  55*  57*   CREATININE  2.5*  2.6*   CALCIUM  9.0  8.5*   PROT  6.3  5.5*   ALBUMIN  3.3*  2.9*   BILITOT  0.4  0.4   ALKPHOS  75  70   AST  34  52*   ALT  40  38   ANIONGAP  13  10   EGFRNONAA  17*  16*     Urine Studies:   Recent Labs   Lab  10/01/18   1909   COLORU  Yellow   APPEARANCEUA  Clear   PHUR  6.0   SPECGRAV  1.010   PROTEINUA  Negative   GLUCUA  Negative   KETONESU  Negative   BILIRUBINUA  Negative   OCCULTUA  Negative   NITRITE  Negative   UROBILINOGEN  Negative   LEUKOCYTESUR  Trace*   WBCUA  2   BACTERIA  Moderate*   HYALINECASTS  3*       Significant Imaging:     CT Abd/Pelvis w/o Contrast: Reviewed Radiologist's report-- 1) Left colon thickening and pericolic fat stranding consistent with  nonspecific diffuse infectious versus inflammatory versus developing ischemia 2) Distal colon diverticulosis without CY evidence of diverticulitis. 3) 4.1cm AP diameter infrarenal distal abdominal aortic aneurysm, previously 3.9cm. 4) Stable small incidental cysts, cystic lesions or focal ductal dilatation along the pancreatic head and uncinate process. 5) Bilateral nephrolithiasis 6) Previous cholecystectomy and hysterectomy 7) Small hiatal hernia versus mild lower esophageal thickening/esophagitis    Abdominal Flat&Erect 10/1/18: Reviewed radiologist's report-   Impression     No acute process.  Possible bilateral nephrolithiasis.  Postoperative change.

## 2018-10-03 NOTE — ASSESSMENT & PLAN NOTE
Secondary to dehydration-  Gentle IVF Hydration due to CHF  Home diuretics on hold  Spot urine Na and Creat, Apple's stain  Avoid NSAIDs/Nephrotoxic agents  Renal dose medications  Renal diet.  Trend BMp

## 2018-10-03 NOTE — ASSESSMENT & PLAN NOTE
Chronic/Unstable--fluctuates between hypotension and normopressure  Hold chronic medications for now  Monitor VS

## 2018-10-03 NOTE — PROGRESS NOTES
[facility] Hospital Medicine  Progress Note    Patient Name: Blaire Cage  MRN: [id number]  Patient Class: OP- Observation   Admission Date: 10/2/2018  Length of Stay: 0 days  Attending Physician: Venancio Zamarripa MD  Primary Care Provider: Eli Edmonds MD      Subjective:     Principal Problem:Colitis, acute    HPI:  This  is a 88 y.o. female with PMHx of hypertension, DM type 2, hyperlipidemia, COPD, hypothyroidism, and pulmonary emboli on Apixaban. She was recently hospitalized 9/13-9/16 for SHALINI, dehydration, and hypotension. She was discharged to home with home health and presented to the ED yesterday with complaints of abdominal pain, nausea, and vomiting. She was diagnosed with dehydration and constipation. She was treated with Magnesium Citrate, 500cc NS bolus, and her lasix was decreased. She returns to the ED today with complaints of hypotension. Daughter reports that her systolic blood pressure was in the low 90's at home. She has been having nausea and dry heaving today, but was able to drink fluids. While in the ED, she was found to have abdominal pain and an abdominal CT was done and results are pending. Her BP is stable but she was found to have some mild worsening renal function. She currently denies pain or discomforts. Her daughter is at bedside. Patient was placed in the hospital for further evaluation and treatment.      Hospital Course:  The patient was monitored closely during her stay. She was placed on IVF for hydration and Iv Zosyn. The ct scan of the abdomen and pelvis showed signs of infectious or inflammatory colitis along the transverse and descending segments. Patient remained afebrile and her Wbcs were wnl. She was tolerating po intake well and her Iv Zosyn was changed to po flagyl and cipro. Physical therapy and Occupational therapy were ordered for debility. She was noted to have significant anemia. Recent iron levels showed ferritin elevated and iron levels  on low side of normal. Ferritin level ordered to be repeated. Prior folate level Wnl. Prior Vitamin B12 levels on the low side of normal and a dose of subq vitamin B12 was given. The patient was continued on Ivf for hydration for her SHALINI secondary to dehydration.     Interval  History: Feeling much better. Change Iv abx to po. Continue IVF for hydration. Monitor SHALINI.     Review of Systems   Constitutional: Positive for activity change and fatigue. Negative for appetite change, chills, diaphoresis and fever.   HENT: Positive for hearing loss. Negative for congestion, ear pain and facial swelling.    Eyes: Negative for pain and redness.   Respiratory: Negative for cough and shortness of breath.    Cardiovascular: Negative for chest pain, palpitations and leg swelling.   Gastrointestinal: Positive for constipation. Negative for abdominal distention, abdominal pain, blood in stool, diarrhea, nausea and vomiting.   Endocrine: Negative for polydipsia and polyphagia.   Genitourinary: Negative for difficulty urinating, dysuria, flank pain and hematuria.   Musculoskeletal: Negative for arthralgias, neck pain and neck stiffness.   Skin: Negative for color change.   Allergic/Immunologic: Negative for food allergies.   Neurological: Positive for weakness. Negative for dizziness, seizures, syncope, facial asymmetry, speech difficulty and light-headedness.   Hematological: Bruises/bleeds easily.        On Apixaban   Psychiatric/Behavioral: Negative for agitation, behavioral problems, confusion and hallucinations. The patient is not nervous/anxious.      Objective:     Vital Signs (Most Recent):  Temp: 98.3 °F (36.8 °C) (10/03/18 1550)  Pulse: 61 (10/03/18 1550)  Resp: 17 (10/03/18 1550)  BP: 139/65 (10/03/18 1550)  SpO2: 96 % (10/03/18 1550) Vital Signs (24h Range):  Temp:  [96.6 °F (35.9 °C)-98.6 °F (37 °C)] 98.3 °F (36.8 °C)  Pulse:  [] 61  Resp:  [14-18] 17  SpO2:  [91 %-100 %] 96 %  BP: (102-139)/(52-67) 139/65      Weight: 58.3 kg (128 lb 8.5 oz)  Body mass index is 22.77 kg/m².    Physical Exam   Constitutional: She is oriented to person, place, and time. She appears well-developed and well-nourished. No distress.   Frail   HENT:   Head: Normocephalic and atraumatic.   Mild [place]   Eyes: Conjunctivae and EOM are normal. Pupils are equal, round, and reactive to light. Right eye exhibits no discharge. Left eye exhibits no discharge.   Neck: Normal range of motion. Neck supple. No JVD present.   Cardiovascular: Normal rate, regular rhythm and intact distal pulses.   Pulmonary/Chest: Effort normal and breath sounds normal. No respiratory distress. She has no wheezes.   Abdominal: Soft. Bowel sounds are normal. She exhibits no distension. There is no tenderness. There is no guarding.   Genitourinary:   Genitourinary Comments: Not examined   Musculoskeletal: Normal range of motion. She exhibits no edema.   Generalized weakness   Neurological: She is alert and oriented to person, place, and time.   Skin: Skin is dry. Capillary refill takes less than 2 seconds. She is not diaphoretic.   Psychiatric: She has a normal mood and affect. Her behavior is normal. Judgment and thought content normal.     CRANIAL NERVES     CN III, IV, VI   Pupils are equal, round, and reactive to light.  Extraocular motions are normal.      Labs: Reviewed    Assessment/Plan:      * Colitis, acute    Per CT Scan: Left colon thickening and pericolic fat stranding consistent with nonspecific diffuse infectious versus inflammatory versus developing ischemia   Change Iv Zosyn to po cipro and flagyl             Macrocytic anemia    Add sq Vitamin B12 x 1 dose          Bradycardia    Monitor  Telemetry  Blood pressure stable          Kidney stones    Nonobstructive          Anemia of chronic disease    Stable.  Continue iron supplementation   Monitor labs  Transfuse if Hgb <8.0 (CHF Hx) or symptomatic  Add sq vitamin B12 x 1 dose        History of pulmonary  embolism    Continue home Apixaban          Type 2 diabetes mellitus with kidney complication, without long-term current use of insulin    Not currently on chronic medications  Monitor blood sugars AC&HS with correctional SSI  Diabetic diet  Check HGA1C  Blood sugars running 139-229          Debility    Fall and skin precautions  PT/OT          Moderate malnutrition    Add po supplement          Hyponatremia    Monitor  Chronic          Acute kidney injury superimposed on chronic kidney disease 3    Secondary to dehydration-  Gentle IVF Hydration due to CHF  Home diuretics on hold  Spot urine Na and Creat, Apple's stain  Avoid NSAIDs/Nephrotoxic agents  Renal dose medications  Renal diet.  Trend BMp          Left ventricular diastolic dysfunction with preserved systolic function    Chronic/Stable.  Hold diuretic due to SHALINI  Not on chronic ACEI/ARB, likley due to renal dysfunction  Not on chronic BB  Monitor closely for volume overload while being gently hydrated for SHALINI  Weigh daily  Monitor on telemetry  Last TTE 4/15/19: Normal ejection fraction . EF>55% Moderate concentric hypertrophy observed. Grade I (mild) left ventricular diastolic dysfunction.        Abdominal aortic aneurysm without rupture    Stable  Maintain adequate BP management          COPD (chronic obstructive pulmonary disease) with acute bronchitis    Stable.   Nebulizers/O2 prn          Hypertension associated with diabetes    Chronic/Unstable--fluctuates between hypotension and normopressure  Hold chronic medications for now  Monitor VS          Hypothyroidism    Continue  Levothyroxine to 88mcg daily            VTE Risk Mitigation (From admission, onward)        Ordered     apixaban tablet 5 mg  2 times daily      10/03/18 0015     IP VTE HIGH RISK PATIENT  Once      10/02/18 2226     Reason for No Pharmacological VTE Prophylaxis  Once      10/02/18 2226        JUAN C Guevara  Department of Hospital Medicine   [facility]    Time spent seeing patient( greater than 1/2 spent in direct contact) : 42 minutes

## 2018-10-03 NOTE — PLAN OF CARE
Problem: Physical Therapy Goal  Goal: Physical Therapy Goal  Outcome: Ongoing (interventions implemented as appropriate)  Goals to be met by: 10/10/18     Patient will increase functional independence with mobility by performin. Independent gait without AD > 250 ft  2. Independent with bed mobility and transfers.    Comments: Patient seen for eval and gait training to promote balance and increased mobility.

## 2018-10-03 NOTE — HOSPITAL COURSE
The patient was monitored closely during her stay. She was placed on IVF for hydration and Iv Zosyn. The ct scan of the abdomen and pelvis showed signs of infectious or inflammatory colitis along the transverse and descending segments. Patient remained afebrile and her Wbcs were wnl. She was tolerating po intake well and her Iv Zosyn was changed to po flagyl and cipro. Physical therapy and Occupational therapy were ordered for debility. She was noted to have significant anemia. Recent iron levels showed ferritin elevated and iron levels on low side of normal. Ferritin level ordered to be repeated. Prior folate level Wnl. Prior Vitamin B12 levels on the low side of normal and a dose of subq vitamin B12 was given. The patient was continued on Ivf for her SHALINI secondary to dehydration.  Her abdominal pain, nausea, and vomiting resolved. Her SHALINI resolved. Her po intake remained stable. Her overall condition remained stable and improved and she was discharged to home.

## 2018-10-03 NOTE — PLAN OF CARE
PCP is Dr Edmonds.  Verified insurance as [employer].  Patient lives alone; independent with ADL's.  Family drives patient as needed.  Patient has 6 living children; 3 daughters that live nearby patient, and are very attentive to patient.  Left Advance Directives paperwork with patient, she states that her daughters wanted it.  Active with [facility].  Discharge plan is home with HH.       10/03/18 1216   Discharge Assessment   Assessment Type Discharge Planning Assessment   Confirmed/corrected address and phone number on facesheet? Yes   Assessment information obtained from? Patient   Prior to hospitilization cognitive status: Alert/Oriented   Prior to hospitalization functional status: Independent;Assistive Equipment   Current cognitive status: Alert/Oriented   Current Functional Status: Independent;Assistive Equipment   Lives With alone   Able to Return to Prior Arrangements yes   Is patient able to care for self after discharge? Yes   Patient's perception of discharge disposition home or selfcare   Patient currently receives any other outside agency services? Yes   Name and contact number of agency or person providing outside services [facility]   Is it the patient/care giver preference to resume care with the current outside agency? Yes   Equipment Currently Used at Home walker, rolling;glucometer;nebulizer;oxygen   Do you have any problems affording any of your prescribed medications? No   Is the patient taking medications as prescribed? yes   Does the patient have transportation home? Yes   Transportation Available family or friend will provide   Dialysis Name and Scheduled days none   Does the patient receive services at the [facility]? No   Discharge Plan A Home Health   Patient/Family In Agreement With Plan yes

## 2018-10-03 NOTE — SUBJECTIVE & OBJECTIVE
Interval  History: Feeling much better. Change Iv abx to po. Continue IVF for hydration. Monitor SHALINI.     Review of Systems   Constitutional: Positive for activity change and fatigue. Negative for appetite change, chills, diaphoresis and fever.   HENT: Positive for hearing loss. Negative for congestion, ear pain and facial swelling.    Eyes: Negative for pain and redness.   Respiratory: Negative for cough and shortness of breath.    Cardiovascular: Negative for chest pain, palpitations and leg swelling.   Gastrointestinal: Positive for constipation. Negative for abdominal distention, abdominal pain, blood in stool, diarrhea, nausea and vomiting.   Endocrine: Negative for polydipsia and polyphagia.   Genitourinary: Negative for difficulty urinating, dysuria, flank pain and hematuria.   Musculoskeletal: Negative for arthralgias, neck pain and neck stiffness.   Skin: Negative for color change.   Allergic/Immunologic: Negative for food allergies.   Neurological: Positive for weakness. Negative for dizziness, seizures, syncope, facial asymmetry, speech difficulty and light-headedness.   Hematological: Bruises/bleeds easily.        On Apixaban   Psychiatric/Behavioral: Negative for agitation, behavioral problems, confusion and hallucinations. The patient is not nervous/anxious.      Objective:     Vital Signs (Most Recent):  Temp: 98.3 °F (36.8 °C) (10/03/18 1550)  Pulse: 61 (10/03/18 1550)  Resp: 17 (10/03/18 1550)  BP: 139/65 (10/03/18 1550)  SpO2: 96 % (10/03/18 1550) Vital Signs (24h Range):  Temp:  [96.6 °F (35.9 °C)-98.6 °F (37 °C)] 98.3 °F (36.8 °C)  Pulse:  [] 61  Resp:  [14-18] 17  SpO2:  [91 %-100 %] 96 %  BP: (102-139)/(52-67) 139/65     Weight: 58.3 kg (128 lb 8.5 oz)  Body mass index is 22.77 kg/m².    Physical Exam   Constitutional: She is oriented to person, place, and time. She appears well-developed and well-nourished. No distress.   Frail   HENT:   Head: Normocephalic and atraumatic.   Mild [place]    Eyes: Conjunctivae and EOM are normal. Pupils are equal, round, and reactive to light. Right eye exhibits no discharge. Left eye exhibits no discharge.   Neck: Normal range of motion. Neck supple. No JVD present.   Cardiovascular: Normal rate, regular rhythm and intact distal pulses.   Pulmonary/Chest: Effort normal and breath sounds normal. No respiratory distress. She has no wheezes.   Abdominal: Soft. Bowel sounds are normal. She exhibits no distension. There is no tenderness. There is no guarding.   Genitourinary:   Genitourinary Comments: Not examined   Musculoskeletal: Normal range of motion. She exhibits no edema.   Generalized weakness   Neurological: She is alert and oriented to person, place, and time.   Skin: Skin is dry. Capillary refill takes less than 2 seconds. She is not diaphoretic.   Psychiatric: She has a normal mood and affect. Her behavior is normal. Judgment and thought content normal.         CRANIAL NERVES     CN III, IV, VI   Pupils are equal, round, and reactive to light.  Extraocular motions are normal.      Labs: Reviewed

## 2018-10-03 NOTE — ASSESSMENT & PLAN NOTE
Chronic/Stable.  Hold diuretic due to SHALINI  Not on chronic ACEI/ARB, likley due to renal dysfunction  Not on chronic BB  Monitor closely for volume overload while being gently hydrated for SHALINI  Weigh daily  Monitor on telemetry  Last TTE 4/15/19: Normal ejection fraction . EF>55% Moderate concentric hypertrophy observed. Grade I (mild) left ventricular diastolic dysfunction.

## 2018-10-03 NOTE — ASSESSMENT & PLAN NOTE
Lab Results   Component Value Date    TSH 0.189 (L) 09/13/2018   Hyperthyroid  Decrease Levothyroxine to 88mcg daily

## 2018-10-04 VITALS
HEIGHT: 63 IN | BODY MASS INDEX: 22.77 KG/M2 | DIASTOLIC BLOOD PRESSURE: 68 MMHG | OXYGEN SATURATION: 98 % | WEIGHT: 128.5 LBS | HEART RATE: 65 BPM | TEMPERATURE: 97 F | SYSTOLIC BLOOD PRESSURE: 157 MMHG | RESPIRATION RATE: 16 BRPM

## 2018-10-04 PROBLEM — E87.1 HYPONATREMIA: Status: RESOLVED | Noted: 2018-06-28 | Resolved: 2018-10-04

## 2018-10-04 PROBLEM — K52.9 COLITIS, ACUTE: Status: RESOLVED | Noted: 2018-10-03 | Resolved: 2018-10-04

## 2018-10-04 PROBLEM — R00.1 BRADYCARDIA: Status: RESOLVED | Noted: 2018-10-03 | Resolved: 2018-10-04

## 2018-10-04 PROBLEM — N18.9 ACUTE KIDNEY INJURY SUPERIMPOSED ON CHRONIC KIDNEY DISEASE: Status: RESOLVED | Noted: 2018-06-28 | Resolved: 2018-10-04

## 2018-10-04 PROBLEM — N17.9 ACUTE KIDNEY INJURY SUPERIMPOSED ON CHRONIC KIDNEY DISEASE: Status: RESOLVED | Noted: 2018-06-28 | Resolved: 2018-10-04

## 2018-10-04 LAB
ANION GAP SERPL CALC-SCNC: 8 MMOL/L
BACTERIA UR CULT: NORMAL
BACTERIA UR CULT: NORMAL
BASOPHILS # BLD AUTO: 0 K/UL
BASOPHILS NFR BLD: 0.1 %
BUN SERPL-MCNC: 38 MG/DL
CALCIUM SERPL-MCNC: 8.2 MG/DL
CHLORIDE SERPL-SCNC: 108 MMOL/L
CO2 SERPL-SCNC: 23 MMOL/L
CREAT SERPL-MCNC: 1.4 MG/DL
DIFFERENTIAL METHOD: ABNORMAL
EOSINOPHIL # BLD AUTO: 0.3 K/UL
EOSINOPHIL NFR BLD: 3.5 %
ERYTHROCYTE [DISTWIDTH] IN BLOOD BY AUTOMATED COUNT: 14.1 %
EST. GFR  (AFRICAN AMERICAN): 39 ML/MIN/1.73 M^2
EST. GFR  (NON AFRICAN AMERICAN): 34 ML/MIN/1.73 M^2
ESTIMATED AVG GLUCOSE: 140 MG/DL
FERRITIN SERPL-MCNC: 393 NG/ML
GLUCOSE SERPL-MCNC: 102 MG/DL
HBA1C MFR BLD HPLC: 6.5 %
HCT VFR BLD AUTO: 25.2 %
HGB BLD-MCNC: 8.3 G/DL
LYMPHOCYTES # BLD AUTO: 1.1 K/UL
LYMPHOCYTES NFR BLD: 14.7 %
MCH RBC QN AUTO: 31.4 PG
MCHC RBC AUTO-ENTMCNC: 32.8 G/DL
MCV RBC AUTO: 95 FL
MONOCYTES # BLD AUTO: 0.7 K/UL
MONOCYTES NFR BLD: 9.1 %
NEUTROPHILS # BLD AUTO: 5.3 K/UL
NEUTROPHILS NFR BLD: 72.6 %
PLATELET # BLD AUTO: 152 K/UL
PMV BLD AUTO: 9 FL
POTASSIUM SERPL-SCNC: 3.9 MMOL/L
RBC # BLD AUTO: 2.64 M/UL
SODIUM SERPL-SCNC: 139 MMOL/L
WBC # BLD AUTO: 7.3 K/UL

## 2018-10-04 PROCEDURE — 25000003 PHARM REV CODE 250: Performed by: NURSE PRACTITIONER

## 2018-10-04 PROCEDURE — 97116 GAIT TRAINING THERAPY: CPT

## 2018-10-04 PROCEDURE — 27000221 HC OXYGEN, UP TO 24 HOURS

## 2018-10-04 PROCEDURE — 96360 HYDRATION IV INFUSION INIT: CPT

## 2018-10-04 PROCEDURE — 36415 COLL VENOUS BLD VENIPUNCTURE: CPT

## 2018-10-04 PROCEDURE — 25000003 PHARM REV CODE 250: Performed by: HOSPITALIST

## 2018-10-04 PROCEDURE — 96372 THER/PROPH/DIAG INJ SC/IM: CPT

## 2018-10-04 PROCEDURE — 25000242 PHARM REV CODE 250 ALT 637 W/ HCPCS: Performed by: HOSPITALIST

## 2018-10-04 PROCEDURE — 85025 COMPLETE CBC W/AUTO DIFF WBC: CPT

## 2018-10-04 PROCEDURE — 94640 AIRWAY INHALATION TREATMENT: CPT

## 2018-10-04 PROCEDURE — 94761 N-INVAS EAR/PLS OXIMETRY MLT: CPT

## 2018-10-04 PROCEDURE — 96361 HYDRATE IV INFUSION ADD-ON: CPT

## 2018-10-04 PROCEDURE — 82728 ASSAY OF FERRITIN: CPT

## 2018-10-04 PROCEDURE — G0378 HOSPITAL OBSERVATION PER HR: HCPCS

## 2018-10-04 PROCEDURE — 82962 GLUCOSE BLOOD TEST: CPT

## 2018-10-04 PROCEDURE — 80048 BASIC METABOLIC PNL TOTAL CA: CPT

## 2018-10-04 RX ORDER — CIPROFLOXACIN 500 MG/1
500 TABLET ORAL EVERY 24 HOURS
Status: DISCONTINUED | OUTPATIENT
Start: 2018-10-04 | End: 2018-10-04 | Stop reason: HOSPADM

## 2018-10-04 RX ORDER — FUROSEMIDE 40 MG/1
20 TABLET ORAL
Qty: 30 TABLET | Refills: 0 | Status: ON HOLD | OUTPATIENT
Start: 2018-10-05 | End: 2018-12-01 | Stop reason: SDUPTHER

## 2018-10-04 RX ORDER — ACETAMINOPHEN 325 MG/1
650 TABLET ORAL EVERY 4 HOURS PRN
Refills: 0 | COMMUNITY
Start: 2018-10-04 | End: 2021-09-21

## 2018-10-04 RX ORDER — CIPROFLOXACIN 500 MG/1
500 TABLET ORAL EVERY 12 HOURS
Qty: 14 TABLET | Refills: 0 | Status: SHIPPED | OUTPATIENT
Start: 2018-10-04 | End: 2018-10-11

## 2018-10-04 RX ORDER — METRONIDAZOLE 250 MG/1
250 TABLET ORAL EVERY 8 HOURS
Qty: 21 TABLET | Refills: 0 | Status: SHIPPED | OUTPATIENT
Start: 2018-10-04 | End: 2018-10-11

## 2018-10-04 RX ORDER — POLYETHYLENE GLYCOL 3350 17 G/17G
17 POWDER, FOR SOLUTION ORAL 2 TIMES DAILY
Qty: 60 PACKET | Refills: 0 | Status: SHIPPED | OUTPATIENT
Start: 2018-10-04 | End: 2019-06-06 | Stop reason: CLARIF

## 2018-10-04 RX ORDER — FERROUS SULFATE 325(65) MG
325 TABLET ORAL 2 TIMES DAILY WITH MEALS
Qty: 180 TABLET | Refills: 3 | Status: SHIPPED | OUTPATIENT
Start: 2018-10-04 | End: 2019-11-29 | Stop reason: SDUPTHER

## 2018-10-04 RX ORDER — LEVOTHYROXINE SODIUM 88 UG/1
88 TABLET ORAL
Qty: 90 TABLET | Refills: 3 | Status: SHIPPED | OUTPATIENT
Start: 2018-10-04 | End: 2019-11-04 | Stop reason: SDUPTHER

## 2018-10-04 RX ADMIN — APIXABAN 5 MG: 2.5 TABLET, FILM COATED ORAL at 09:10

## 2018-10-04 RX ADMIN — LEVOTHYROXINE SODIUM 88 MCG: 88 TABLET ORAL at 05:10

## 2018-10-04 RX ADMIN — ASPIRIN 81 MG: 81 TABLET, COATED ORAL at 09:10

## 2018-10-04 RX ADMIN — Medication 400 MG: at 09:10

## 2018-10-04 RX ADMIN — SODIUM CHLORIDE: 0.9 INJECTION, SOLUTION INTRAVENOUS at 05:10

## 2018-10-04 RX ADMIN — METRONIDAZOLE 250 MG: 250 TABLET ORAL at 05:10

## 2018-10-04 RX ADMIN — IPRATROPIUM BROMIDE AND ALBUTEROL SULFATE 3 ML: .5; 3 SOLUTION RESPIRATORY (INHALATION) at 07:10

## 2018-10-04 RX ADMIN — SERTRALINE 25 MG: 25 TABLET, FILM COATED ORAL at 09:10

## 2018-10-04 RX ADMIN — OXYCODONE HYDROCHLORIDE AND ACETAMINOPHEN 500 MG: 500 TABLET ORAL at 09:10

## 2018-10-04 RX ADMIN — DOCUSATE SODIUM 100 MG: 100 CAPSULE, LIQUID FILLED ORAL at 09:10

## 2018-10-04 RX ADMIN — THERA TABS 1 TABLET: TAB at 09:10

## 2018-10-04 RX ADMIN — FLUTICASONE FUROATE AND VILANTEROL TRIFENATATE 1 PUFF: 100; 25 POWDER RESPIRATORY (INHALATION) at 07:10

## 2018-10-04 RX ADMIN — CIPROFLOXACIN HYDROCHLORIDE 500 MG: 500 TABLET, FILM COATED ORAL at 09:10

## 2018-10-04 NOTE — PT/OT/SLP PROGRESS
Physical Therapy Treatment    Patient Name:  Blaire Cage   MRN:  [id number]    Recommendations:     Discharge Recommendations:      Discharge Equipment Recommendations: none   Barriers to discharge: None    Assessment:     Blaire Cage is a 88 y.o. female admitted with a medical diagnosis of Colitis, acute.  She presents with the following impairments/functional limitations:  weakness, impaired endurance, impaired self care skills, impaired functional mobilty, gait instability .    Rehab Prognosis:  good; patient would benefit from acute skilled PT services to address these deficits and reach maximum level of function.      Recent Surgery: * No surgery found *      Plan:     During this hospitalization, patient to be seen 5 x/week to address the above listed problems via gait training, therapeutic activities, therapeutic exercises  · Plan of Care Expires:  10/10/18   Plan of Care Reviewed with: patient    Subjective     Communicated with nurse Schmidt prior to session.  Patient found supine in bed upon PT entry to room, agreeable to treatment.      Chief Complaint: none stated  Patient comments/goals: to return home  Pain/Comfort:  · Pain Rating 1: 0/10    Patients cultural, spiritual, Gnosticism conflicts given the current situation:      Objective:     Patient found with: peripheral IV, oxygen     General Precautions: Standard, fall   Orthopedic Precautions:N/A   Braces:       Functional Mobility:  · Bed Mobility:     · Rolling Left:  modified independence  · Rolling Right: modified independence  · Supine to Sit: modified independence  · Sit to Supine: modified independence  · Transfers:     · Sit to Stand:  supervision with no AD  · Gait: 240' with CGA, pushing IV with O2 attached.      AM-PAC 6 CLICK MOBILITY          Therapeutic Activities and Exercises:       Patient left supine with all lines intact, call button in reach and nurse Schmidt notified..    GOALS:   Multidisciplinary Problems     Physical  Therapy Goals     Not on file          Multidisciplinary Problems (Resolved)        Problem: Physical Therapy Goal    Goal Priority Disciplines Outcome Goal Variances Interventions   Physical Therapy Goal   (Resolved)     PT, PT/OT Outcome(s) achieved                     Time Tracking:     PT Received On: 10/04/18  PT Start Time: 1005     PT Stop Time: 1015  PT Total Time (min): 10 min     Billable Minutes: Gait Training 10min    Treatment Type: Treatment  PT/PTA: PTA     PTA Visit Number: 1     Jerri Roman PTA  10/04/2018

## 2018-10-04 NOTE — PLAN OF CARE
Problem: Patient Care Overview  Goal: Plan of Care Review  Patient aerosol Q6w/a given via msk tolerated well sats 95% on 2lpm/patient home O2

## 2018-10-04 NOTE — PLAN OF CARE
10/04/18 1243   Final Note   Assessment Type Final Discharge Note   Discharge Disposition Home-Health

## 2018-10-04 NOTE — NURSING
Discharge instructions went over with pt. Pt verbalizes understanding and has no new questions at this time. VSS. IV removed, catheter intact. Telemetry monitor removed. AVS printed and given to pt. Pt left via wheelchair with staff.

## 2018-10-04 NOTE — PLAN OF CARE
SSC sent patient information to [facility] and [facility] for resumption of home health services through [facility] system.MARIANA Mckenna    · 10/4/2018 12:21:07 PM Accepted: thank you  [email or web address]

## 2018-10-04 NOTE — PLAN OF CARE
Problem: Patient Care Overview  Goal: Plan of Care Review  Outcome: Ongoing (interventions implemented as appropriate)  POC reviewed with pt with verbalized understanding. Free from falls, safety maintained, VSS. Call light in reach, bed locked/lowest position. Srx2. Will continue to monitor.

## 2018-10-04 NOTE — DISCHARGE INSTRUCTIONS
Thank you for choosing [facility] for your medical care. The primary doctor who is taking care of you at the time of your discharge is Venancio Zamarripa MD.     You were admitted to the hospital with Colitis, acute.     Please note your discharge instructions, including diet/activity restrictions, follow-up appointments, and medication changes.  If you have any questions about your medical issues, prescriptions, or any other questions, please feel free to contact the [facility] Medicine Dept at [phone] and we will help.    If you are previously with Home health, outpatient PT/OT or under a therapy program, you are cleared to return to those programs.    Please direct all long term medication refills and follow up to your primary care provider, Eli Edmonds MD. Thank you again for letting us take care of your health care needs.    Please note the following discharge instructions per your discharging physician-  Jennifer Portillo Np

## 2018-10-04 NOTE — PLAN OF CARE
SSC met with patient at bedside regarding resumption of home health.  Patient signed patient preference form choosing to resume home health with [facility].MARIANA Mckenna

## 2018-10-04 NOTE — DISCHARGE SUMMARY
[facility]  Hospital Medicine  Discharge Summary    Patient Name: Blaire Cage  MRN: [id number]  Admission Date: 10/2/2018  Hospital Length of Stay: 0 days  Discharge Date and Time:  10/04/2018 1:45 PM  Attending Physician: Beatrice att. providers found   Discharging Provider: JUAN C Guevara  Primary Care Provider: Eli Edmonds MD    HPI:   This  is a 88 y.o. female with PMHx of hypertension, DM type 2, hyperlipidemia, COPD, hypothyroidism, and pulmonary emboli on Apixaban. She was recently hospitalized 9/13-9/16 for SHALINI, dehydration, and hypotension. She was discharged to home with home health and presented to the ED yesterday with complaints of abdominal pain, nausea, and vomiting. She was diagnosed with dehydration and constipation. She was treated with Magnesium Citrate, 500cc NS bolus, and her lasix was decreased. She returns to the ED today with complaints of hypotension. Daughter reports that her systolic blood pressure was in the low 90's at home. She has been having nausea and dry heaving today, but was able to drink fluids. While in the ED, she was found to have abdominal pain and an abdominal CT was done and results are pending. Her BP is stable but she was found to have some mild worsening renal function. She currently denies pain or discomforts. Her daughter is at bedside. Patient was placed in the hospital for further evaluation and treatment.      * No surgery found *      Hospital Course:   The patient was monitored closely during her stay. She was placed on IVF for hydration and Iv Zosyn. The ct scan of the abdomen and pelvis showed signs of infectious or inflammatory colitis along the transverse and descending segments. Patient remained afebrile and her Wbcs were wnl. She was tolerating po intake well and her Iv Zosyn was changed to po flagyl and cipro. Physical therapy and Occupational therapy were ordered for debility. She was noted to have significant anemia. Recent  iron levels showed ferritin elevated and iron levels on low side of normal. Ferritin level ordered to be repeated. Prior folate level Wnl. Prior Vitamin B12 levels on the low side of normal and a dose of subq vitamin B12 was given. The patient was continued on Ivf for her SHALINI secondary to dehydration.  Her abdominal pain, nausea, and vomiting resolved. Her SHALINI resolved. Her po intake remained stable. Her overall condition remained stable and improved and she was discharged to home with her prior Home Health to follow.        Consults:   Consults (From admission, onward)        Status Ordering Provider     Inpatient consult to Social Work/Case Management  Once     Provider:  (Not yet assigned)    Completed JESSICA RICKETTS        Service: Hospital Medicine    Final Active Diagnoses:    Diagnosis Date Noted POA    Anemia of chronic disease [D63.8] 10/03/2018 Yes    Kidney stones [N20.0] 10/03/2018 Yes    Macrocytic anemia [D53.9] 10/03/2018 Yes    Type 2 diabetes mellitus with kidney complication, without long-term current use of insulin [E11.29] 06/28/2018 Yes    Debility [R53.81] 06/28/2018 Yes    Moderate malnutrition [E44.0] 06/28/2018 Yes    Chronic anticoagulation [Z79.01] 06/28/2018 Not Applicable    History of pulmonary embolism [Z86.711] 06/09/2018 Yes    Left ventricular diastolic dysfunction with preserved systolic function [I51.9] 06/05/2016 Yes    Abdominal aortic aneurysm without rupture [I71.4] 11/05/2015 Yes    COPD (chronic obstructive pulmonary disease) with acute bronchitis [J44.0, J20.9] 01/08/2015 Yes    Hypothyroidism [E03.9] 01/18/2013 Yes    Hypertension associated with diabetes [E11.59, I10] 01/18/2013 Yes      Problems Resolved During this Admission:    Diagnosis Date Noted Date Resolved POA    PRINCIPAL PROBLEM:  Colitis, acute [K52.9] 10/03/2018 10/04/2018 Yes    Bradycardia [R00.1] 10/03/2018 10/04/2018 Yes    Acute kidney injury superimposed on chronic kidney disease 3  [N17.9, N18.9] 06/28/2018 10/04/2018 Yes    Hyponatremia [E87.1] 06/28/2018 10/04/2018 Yes    Dehydration [E86.0] 05/21/2015 10/04/2018 Yes     Discharged Condition: stable    Disposition: Home-Health Care Svc- [facility]    Follow Up:  Follow-up Information     Eli Edmonds MD In 2 weeks.    Specialty:  Family Medicine  Contact information:  [address] [facility] [address]  [phone]             [facility].    Specialty:  Home Health Services  Why:  Home Health  Contact information:  [address]  [address]  [place] [address]  [phone]                 Patient Instructions:      Diet diabetic   Order Comments: 2000 ADA diet     Notify your health care provider if you experience any of the following:  temperature >100.4     Notify your health care provider if you experience any of the following:  persistent nausea and vomiting or diarrhea     Notify your health care provider if you experience any of the following:  severe uncontrolled pain     Notify your health care provider if you experience any of the following:  persistent dizziness, light-headedness, or visual disturbances     Notify your health care provider if you experience any of the following:  increased confusion or weakness     Notify your health care provider if you experience any of the following:   Order Comments: Any decline in condition     HOME HEALTH ORDERS   Order Comments: Subsequent Home Health Orders    Current Medications:  Current Facility-Administered Medications:  0.9%  NaCl infusion, , Intravenous, Continuous, Erika Maldonado NP, Last Rate: 75 mL/hr at 10/04/18 0519  acetaminophen tablet 650 mg, 650 mg, Oral, Q4H PRN, Erika Maldonado NP  albuterol-ipratropium 2.5 mg-0.5 mg/3 mL nebulizer solution 3 mL, 3 mL, Nebulization, Q6H WAKE, Venancio Zamarripa MD, 3 mL at 10/04/18 0732  apixaban tablet 5 mg, 5 mg, Oral, BID, Erika Maldonado NP, 5 mg at 10/04/18 0921  ascorbic acid (vitamin C) tablet 500 mg,  500 mg, Oral, Daily, Erika Maldonado NP, 500 mg at 10/04/18 0920  aspirin EC tablet 81 mg, 81 mg, Oral, Daily, Erika Maldonado NP, 81 mg at 10/04/18 0920  ciprofloxacin HCl tablet 500 mg, 500 mg, Oral, Daily, Venancio Zamarripa MD, 500 mg at 10/04/18 0921  dextrose 50% injection 12.5 g, 12.5 g, Intravenous, PRN, Erika Maldonado NP  dextrose 50% injection 25 g, 25 g, Intravenous, PRN, Erika Maldonado NP  docusate sodium capsule 100 mg, 100 mg, Oral, BID, JUAN C Qureshi, 100 mg at 10/04/18 0921  ferrous sulfate EC tablet 325 mg, 325 mg, Oral, BID, Erika Maldonado NP, Stopped at 10/04/18 0900  fluticasone 50 mcg/actuation nasal spray 100 mcg, 2 spray, Each Nare, Daily, Erika Maldonado NP  fluticasone-vilanterol 100-25 mcg/dose diskus inhaler 1 puff, 1 puff, Inhalation, Daily, Erika Maldonado NP, 1 puff at 10/04/18 0732  gabapentin capsule 300 mg, 300 mg, Oral, QHS, Erika Maldonado NP, 300 mg at 10/03/18 2028  glucagon (human recombinant) injection 1 mg, 1 mg, Intramuscular, PRN, Erika Maldonado NP  glucose chewable tablet 16 g, 16 g, Oral, PRN, Erika Maldonado NP  glucose chewable tablet 24 g, 24 g, Oral, PRN, Erika Maldonado NP  insulin aspart U-100 pen 0-5 Units, 0-5 Units, Subcutaneous, QID (AC + HS) PRN, Erika Maldonado NP, 1 Units at 10/03/18 2035  levothyroxine tablet 88 mcg, 88 mcg, Oral, Before breakfast, Erika Maldonado NP, 88 mcg at 10/04/18 0518  magnesium oxide tablet 400 mg, 400 mg, Oral, Daily, Erika Maldonado NP, 400 mg at 10/04/18 0922  metroNIDAZOLE tablet 250 mg, 250 mg, Oral, Q8H, JUAN C Qureshi, 250 mg at 10/04/18 0518  montelukast tablet 10 mg, 10 mg, Oral, QHS, Erika Maldonado NP, 10 mg at 10/03/18 2028  multivitamin tablet 1 tablet, 1 tablet, Oral, Daily, JUAN C Qureshi, 1 tablet at 10/04/18 0921  ondansetron injection 4 mg, 4 mg, Intravenous, Q4H PRN, Erika Maldonado, GABRIELA  prochlorperazine injection Soln 5 mg, 5 mg, Intravenous, Q6H PRN, Erika Maldonado,  NP  ramelteon tablet 8 mg, 8 mg, Oral, Nightly PRN, Erika Maldonado NP  senna-docusate 8.6-50 mg per tablet 1 tablet, 1 tablet, Oral, BID PRN, Erika Maldonado NP  sertraline tablet 25 mg, 25 mg, Oral, Daily, Erika Maldonado NP, 25 mg at 10/04/18 0920  simvastatin tablet 40 mg, 40 mg, Oral, QHS, Erika Maldonado NP, 40 mg at 10/03/18 2028        Nursing:   Diabetic Care:   SN to perform and educate Diabetic management with blood glucose monitoring:, Fingerstick blood sugar AC and HS and Report CBG < 60 or > 350 to physician.  Heart Failure:      SN to instruct on the following:    Instruct on the definition of CHF.   Instruct on the signs/sympoms of CHF to be reported.   Instruct on and monitor daily weights.   Instruct on factors that cause exacerbation.   Instruct on action, dose, schedule, and side effects of medications.   Instruct on diet as prescribed.   Instruct on activity allowed.   Instruct on life-style modifications for life long management of CHF   SN to assess compliance with daily weights, diet, medications, fluid retention,     safety precautions, activities permitted and life-style modifications.   Additional 1-2 SN visits per week as needed for signs and symptoms     of CHF exacerbation.      For Weight Gain > 2-3 lbs in 1 day or 4-6 lbs over 1 week notify PCP:    Home Oxygen:  Oxygen at 2 L/min nasal canula to be used:  Continuously.    Diet:   diabetic diet 2000 calorie    Activities:   activity as tolerated  Fall and skin precautions    Labs:  SN to perform labs: Weekly CBC,BMP, magnesium level, and prealbumin level weekly x 3 weeks and Report Lab results to PCP.    Referrals/ Consults  Physical Therapy, Occupational Therapy, and  to evaluate and treat     Order Specific Question Answer Comments   What Home Health Agency is the patient currently using? Other/[facility]     Activity as tolerated   Order Comments: Fall precautions     Significant Diagnostic Studies:      Ferritin 393    CRP 27.2    Lipase 41    HGA1C 6.5    CT Abdomen Pelvis  Without Contrast-  1. No obstructive uropathy.  Bilateral nephrolithiasis.  2. Infectious or inflammatory colitis along the transverse and descending segments.  3. Fusiform abdominal aortic aneurysm, 4 cm.  4. Two cystic lesions along the pancreas head, incompletely characterized but unchanged.  5. Focal pleuroparenchymal opacity left lung base with scar favored.  Consider additional radiographic follow-up given the spiculated character.    X-Ray Abdomen Flat And Erect- No acute process.  Possible bilateral nephrolithiasis.  Postoperative change.    Labs:   CMP   Recent Labs   Lab  10/02/18   1915  10/03/18   0449  10/04/18   0416   NA  130*  135*  139   K  4.1  3.7  3.9   CL  94*  99  108   CO2  26  28  23   GLU  159*  96  102   BUN  57*  55*  38*   CREATININE  2.6*  2.1*  1.4   CALCIUM  8.5*  8.2*  8.2*   PROT  5.5*   --    --    ALBUMIN  2.9*   --    --    BILITOT  0.4   --    --    ALKPHOS  70   --    --    AST  52*   --    --    ALT  38   --    --    ANIONGAP  10  8  8   ESTGFRAFRICA  18*  24*  39*   EGFRNONAA  16*  21*  34*    and CBC   Recent Labs   Lab  10/02/18   2231  10/03/18   0449  10/04/18   0417   WBC  10.20  8.00  7.30   HGB  8.7*  8.1*  8.3*   HCT  26.1*  24.5*  25.2*   PLT  189  172  152       Pending Diagnostic Studies:     None         Medications:  Reconciled Home Medications:      Medication List      START taking these medications    acetaminophen 325 MG tablet  Commonly known as:  TYLENOL  Take 2 tablets (650 mg total) by mouth every 4 (four) hours as needed.     ciprofloxacin HCl 500 MG tablet  Commonly known as:  CIPRO  Take 1 tablet (500 mg total) by mouth every 12 (twelve) hours. for 7 days     metroNIDAZOLE 250 MG tablet  Commonly known as:  FLAGYL  Take 1 tablet (250 mg total) by mouth every 8 (eight) hours. for 7 days     multivitamin tablet  Commonly known as:  THERAGRAN  Take 1 tablet by mouth once daily.      polyethylene glycol 17 gram Pwpk  Commonly known as:  GLYCOLAX  Take 17 g by mouth 2 (two) times daily.     SITagliptin 25 MG Tab  Commonly known as:  JANUVIA  Take 1 tablet (25 mg total) by mouth once daily.        CHANGE how you take these medications    ferrous sulfate 325 mg (65 mg iron) Tab tablet  Commonly known as:  FEOSOL  Take 1 tablet (325 mg total) by mouth 2 (two) times daily with meals.  What changed:  when to take this     furosemide 40 MG tablet  Commonly known as:  LASIX  Take 0.5 tablets (20 mg total) by mouth every Mon, Wed, Fri.  What changed:  when to take this     levothyroxine 88 MCG tablet  Commonly known as:  SYNTHROID  Take 1 tablet (88 mcg total) by mouth before breakfast.  What changed:    · medication strength  · how much to take     nitroGLYCERIN 0.4 MG SL tablet  Commonly known as:  NITROSTAT  Place 1 tablet (0.4 mg total) under the tongue every 5 (five) minutes as needed for Chest pain. As needed  What changed:  additional instructions        CONTINUE taking these medications    albuterol-ipratropium 2.5 mg-0.5 mg/3 mL nebulizer solution  Commonly known as:  DUO-NEB  USE ONE VIAL IN NEBULIZER EVERY 6 HOURS WHILE AWAKE     amLODIPine 5 MG tablet  Commonly known as:  NORVASC  Take 1 tablet (5 mg total) by mouth once daily.     apixaban 5 mg Tab  Commonly known as:  ELIQUIS  Take 1 tablet (5 mg total) by mouth 2 (two) times daily.     aspirin 81 MG EC tablet  Commonly known as:  ECOTRIN  Take 81 mg by mouth once daily.     calcium carbonate 500 mg calcium (1,250 mg) tablet  Commonly known as:  OS-TASIA  Take 1 tablet by mouth 2 (two) times daily.     fish oil-omega-3 fatty acids 300-1,000 mg capsule  Take 2 g by mouth every evening.     fluticasone 50 mcg/actuation nasal spray  Commonly known as:  FLONASE  2 sprays by Each Nare route once daily.     fluticasone-vilanterol 100-25 mcg/dose diskus inhaler  Commonly known as:  BREO  Inhale 1 puff into the lungs once daily. Controller      folic acid-vit B6-vit B12 2.5-25-2 mg 2.5-25-2 mg Tab  Commonly known as:  FOLBIC or Equiv  Take 1 tablet by mouth once daily.     gabapentin 300 MG capsule  Commonly known as:  NEURONTIN  Take 1 capsule (300 mg total) by mouth every evening.     magnesium oxide 400 mg tablet  Commonly known as:  MAG-OX  TAKE ONE TABLET BY MOUTH ONCE DAILY     montelukast 10 mg tablet  Commonly known as:  SINGULAIR  Take 1 tablet (10 mg total) by mouth every evening.     sertraline 25 MG tablet  Commonly known as:  ZOLOFT  Take 1 tablet (25 mg total) by mouth once daily.     simvastatin 40 MG tablet  Commonly known as:  ZOCOR  TAKE ONE TABLET BY MOUTH ONCE DAILY IN THE EVENING     VITAMIN C 500 MG tablet  Generic drug:  ascorbic acid (vitamin C)  Take 500 mg by mouth once daily.     vitamin D 1000 units Tab  Commonly known as:  VITAMIN D3  Take 1 tablet (1,000 Units total) by mouth once daily.        STOP taking these medications    guanFACINE 1 MG Tab  Commonly known as:  TENEX            Indwelling Lines/Drains at time of discharge:   Lines/Drains/Airways          None          Time spent on the discharge of patient: 57 minutes  Patient was seen and examined on the date of discharge and determined to be suitable for discharge.      Jennifer Portillo, JUAN C  Department of Hospital Medicine  [facility]

## 2018-10-04 NOTE — PROGRESS NOTES
Pharmacist Renal Dose Adjustment Note    Blaire Cage is a 88 y.o. female being treated with the medication ciprofloxacin.     Patient Data:    Vital Signs (Most Recent):  Temp: 97.8 °F (36.6 °C) (10/04/18 0739)  Pulse: 61 (10/04/18 0739)  Resp: 18 (10/04/18 0739)  BP: (!) 168/67 (10/04/18 0739)  SpO2: 100 % (10/04/18 0739)   Vital Signs (72h Range):  Temp:  [96.6 °F (35.9 °C)-98.6 °F (37 °C)]   Pulse:  []   Resp:  []   BP: ()/(41-67)   SpO2:  [91 %-100 %]      Recent Labs   Lab  10/02/18   1915  10/03/18   0449  10/04/18   0416   CREATININE  2.6*  2.1*  1.4     Serum creatinine: 1.4 mg/dL 10/04/18 0416  Estimated creatinine clearance: 23 mL/min    Ciprofloxacin 500 mg q12h will be changed to ciprofloxacin 500 mg q24h.     Pharmacist's Name: Xavier Almeida  Pharmacist's Extension: [phone]

## 2018-10-05 ENCOUNTER — LAB VISIT (OUTPATIENT)
Dept: LAB | Facility: HOSPITAL | Age: 83
End: 2018-10-05
Attending: INTERNAL MEDICINE
Payer: MEDICARE

## 2018-10-05 DIAGNOSIS — N18.9 ANEMIA OF CHRONIC RENAL FAILURE: ICD-10-CM

## 2018-10-05 DIAGNOSIS — D63.1 ANEMIA OF CHRONIC RENAL FAILURE: ICD-10-CM

## 2018-10-05 DIAGNOSIS — N18.4 CHRONIC KIDNEY DISEASE, STAGE IV (SEVERE): ICD-10-CM

## 2018-10-05 DIAGNOSIS — D64.9 ANEMIA, UNSPECIFIED: ICD-10-CM

## 2018-10-05 DIAGNOSIS — M81.0 SENILE OSTEOPOROSIS: Primary | ICD-10-CM

## 2018-10-05 DIAGNOSIS — I10 ESSENTIAL HYPERTENSION, MALIGNANT: ICD-10-CM

## 2018-10-05 DIAGNOSIS — E11.9 DIABETES MELLITUS WITHOUT COMPLICATION: ICD-10-CM

## 2018-10-05 DIAGNOSIS — N25.81 SECONDARY HYPERPARATHYROIDISM OF RENAL ORIGIN: ICD-10-CM

## 2018-10-05 DIAGNOSIS — E87.5 HYPERPOTASSEMIA: ICD-10-CM

## 2018-10-05 DIAGNOSIS — N20.0 URIC ACID NEPHROLITHIASIS: ICD-10-CM

## 2018-10-05 DIAGNOSIS — R80.9 PROTEINURIA: ICD-10-CM

## 2018-10-05 DIAGNOSIS — R60.9 EDEMA: ICD-10-CM

## 2018-10-05 LAB
ALBUMIN SERPL BCP-MCNC: 2.8 G/DL
ANION GAP SERPL CALC-SCNC: 9 MMOL/L
BACTERIA #/AREA URNS AUTO: ABNORMAL /HPF
BASOPHILS # BLD AUTO: 0.05 K/UL
BASOPHILS NFR BLD: 0.6 %
BILIRUB UR QL STRIP: NEGATIVE
BUN SERPL-MCNC: 25 MG/DL
CALCIUM SERPL-MCNC: 8.5 MG/DL
CHLORIDE SERPL-SCNC: 105 MMOL/L
CLARITY UR REFRACT.AUTO: ABNORMAL
CO2 SERPL-SCNC: 22 MMOL/L
COLOR UR AUTO: YELLOW
CREAT SERPL-MCNC: 1.6 MG/DL
CREAT UR-MCNC: 75 MG/DL
DIFFERENTIAL METHOD: ABNORMAL
EOSINOPHIL # BLD AUTO: 0.3 K/UL
EOSINOPHIL NFR BLD: 3.3 %
ERYTHROCYTE [DISTWIDTH] IN BLOOD BY AUTOMATED COUNT: 13.5 %
EST. GFR  (AFRICAN AMERICAN): 32.9 ML/MIN/1.73 M^2
EST. GFR  (NON AFRICAN AMERICAN): 28.6 ML/MIN/1.73 M^2
GLUCOSE SERPL-MCNC: 196 MG/DL
GLUCOSE UR QL STRIP: NEGATIVE
HCT VFR BLD AUTO: 26.9 %
HGB BLD-MCNC: 8.4 G/DL
HGB UR QL STRIP: ABNORMAL
HYALINE CASTS UR QL AUTO: 15 /LPF
IMM GRANULOCYTES # BLD AUTO: 0.05 K/UL
IMM GRANULOCYTES NFR BLD AUTO: 0.6 %
KETONES UR QL STRIP: NEGATIVE
LEUKOCYTE ESTERASE UR QL STRIP: ABNORMAL
LYMPHOCYTES # BLD AUTO: 1.2 K/UL
LYMPHOCYTES NFR BLD: 14.1 %
MAGNESIUM SERPL-MCNC: 2 MG/DL
MCH RBC QN AUTO: 31.9 PG
MCHC RBC AUTO-ENTMCNC: 31.2 G/DL
MCV RBC AUTO: 102 FL
MICROSCOPIC COMMENT: ABNORMAL
MONOCYTES # BLD AUTO: 0.9 K/UL
MONOCYTES NFR BLD: 10.9 %
NEUTROPHILS # BLD AUTO: 5.7 K/UL
NEUTROPHILS NFR BLD: 70.5 %
NITRITE UR QL STRIP: NEGATIVE
NRBC BLD-RTO: 0 /100 WBC
PH UR STRIP: 5 [PH] (ref 5–8)
PHOSPHATE SERPL-MCNC: 2.5 MG/DL
PHOSPHATE SERPL-MCNC: 2.5 MG/DL
PLATELET # BLD AUTO: 154 K/UL
PMV BLD AUTO: 11.8 FL
POTASSIUM SERPL-SCNC: 4 MMOL/L
PROT UR QL STRIP: ABNORMAL
PROT UR-MCNC: 56 MG/DL
PROT/CREAT UR: 0.75 MG/G{CREAT}
PTH-INTACT SERPL-MCNC: 81 PG/ML
RBC # BLD AUTO: 2.63 M/UL
RBC #/AREA URNS AUTO: 51 /HPF (ref 0–4)
SODIUM SERPL-SCNC: 136 MMOL/L
SP GR UR STRIP: 1.01 (ref 1–1.03)
SQUAMOUS #/AREA URNS AUTO: 5 /HPF
URATE SERPL-MCNC: 7.5 MG/DL
URN SPEC COLLECT METH UR: ABNORMAL
UROBILINOGEN UR STRIP-ACNC: NEGATIVE EU/DL
WBC # BLD AUTO: 8.15 K/UL
WBC #/AREA URNS AUTO: 61 /HPF (ref 0–5)
WBC CLUMPS UR QL AUTO: ABNORMAL

## 2018-10-05 PROCEDURE — 83970 ASSAY OF PARATHORMONE: CPT

## 2018-10-05 PROCEDURE — 80069 RENAL FUNCTION PANEL: CPT

## 2018-10-05 PROCEDURE — 85025 COMPLETE CBC W/AUTO DIFF WBC: CPT

## 2018-10-05 PROCEDURE — 81001 URINALYSIS AUTO W/SCOPE: CPT

## 2018-10-05 PROCEDURE — 84156 ASSAY OF PROTEIN URINE: CPT

## 2018-10-05 PROCEDURE — 36415 COLL VENOUS BLD VENIPUNCTURE: CPT | Mod: PO

## 2018-10-05 PROCEDURE — 83735 ASSAY OF MAGNESIUM: CPT

## 2018-10-05 PROCEDURE — 84550 ASSAY OF BLOOD/URIC ACID: CPT

## 2018-10-11 ENCOUNTER — OFFICE VISIT (OUTPATIENT)
Dept: CARDIOLOGY | Facility: CLINIC | Age: 83
End: 2018-10-11
Payer: MEDICARE

## 2018-10-11 VITALS
DIASTOLIC BLOOD PRESSURE: 56 MMHG | BODY MASS INDEX: 23.25 KG/M2 | WEIGHT: 131.19 LBS | HEIGHT: 63 IN | OXYGEN SATURATION: 92 % | HEART RATE: 70 BPM | SYSTOLIC BLOOD PRESSURE: 115 MMHG

## 2018-10-11 DIAGNOSIS — I51.89 LEFT VENTRICULAR DIASTOLIC DYSFUNCTION WITH PRESERVED SYSTOLIC FUNCTION: ICD-10-CM

## 2018-10-11 DIAGNOSIS — E78.5 HYPERLIPIDEMIA ASSOCIATED WITH TYPE 2 DIABETES MELLITUS: ICD-10-CM

## 2018-10-11 DIAGNOSIS — I15.2 HYPERTENSION ASSOCIATED WITH DIABETES: Primary | ICD-10-CM

## 2018-10-11 DIAGNOSIS — E11.59 HYPERTENSION ASSOCIATED WITH DIABETES: Primary | ICD-10-CM

## 2018-10-11 DIAGNOSIS — I11.9 HYPERTENSIVE LEFT VENTRICULAR HYPERTROPHY, WITHOUT HEART FAILURE: ICD-10-CM

## 2018-10-11 DIAGNOSIS — E11.69 HYPERLIPIDEMIA ASSOCIATED WITH TYPE 2 DIABETES MELLITUS: ICD-10-CM

## 2018-10-11 PROCEDURE — 99999 PR PBB SHADOW E&M-EST. PATIENT-LVL V: CPT | Mod: PBBFAC,,, | Performed by: INTERNAL MEDICINE

## 2018-10-11 PROCEDURE — 1101F PT FALLS ASSESS-DOCD LE1/YR: CPT | Mod: CPTII,,, | Performed by: INTERNAL MEDICINE

## 2018-10-11 PROCEDURE — 99215 OFFICE O/P EST HI 40 MIN: CPT | Mod: PBBFAC,PO | Performed by: INTERNAL MEDICINE

## 2018-10-11 PROCEDURE — 99212 OFFICE O/P EST SF 10 MIN: CPT | Mod: S$PBB,,, | Performed by: INTERNAL MEDICINE

## 2018-10-11 NOTE — PROGRESS NOTES
[facility]    CC:  Hospital follow-up  Chief Complaint   Patient presents with    Hospital Follow Up     10/2/18       Patient ID: Blaire Cage is a 88 y.o. female with a past medical history of essential hypertension, chronic kidney disease, anemia  HPI  She was recently in the hospital with acute kidney injury, hyperkalemia, nausea or vomiting and hypertension.  Her losartan was discontinued.  Her blood pressure has been remaining within normal range just on Norvasc 5 mg.  She denies any exertional chest pain, shortness of breath, orthopnea, PND, syncope or presyncope    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 8/7/2018    Hip arthritis 3/1/2016    Homocysteinemia 8/7/2018    Hyperlipidemia     Hypertension     Normocytic normochromic anemia 6/29/2018    PE (pulmonary thromboembolism) 06/09/2018    Pneumonia of right lower lobe due to infectious organism 9/11/2017    Thyroid disease     hypothyroid     Past Surgical History:   Procedure Laterality Date    APPENDECTOMY      CHOLECYSTECTOMY      ESOPHAGOGASTRODUODENOSCOPY (EGD) N/A 10/25/2017    Performed by F[facility] Flores [facility] at [facility]    FRACTURE SURGERY      right hip     HERNIA REPAIR      groin    HYSTERECTOMY      VARICOSE VEIN SURGERY       Social History     Socioeconomic History    Marital status: Legally      Spouse name: Not on file    Number of children: Not on file    Years of education: Not on file    Highest  education level: Not on file   Social Needs    Financial resource strain: Not on file    Food insecurity - worry: Not on file    Food insecurity - inability: Not on file    Transportation needs - medical: Not on file    Transportation needs - non-medical: Not on file   Occupational History    Not on file   Tobacco Use    Smoking status: Former Smoker     Packs/day: 0.35     Years: 44.00     Pack years: 15.40     Types: Cigarettes     Last attempt to quit: 4/30/2015     Years since quitting: 3.4    Smokeless tobacco: Never Used    Tobacco comment: 1/08/2015   Substance and Sexual Activity    Alcohol use: No     Alcohol/week: 0.0 oz    Drug use: No    Sexual activity: No   Other Topics Concern    Not on file   Social History Narrative    Not on file     Family History   Problem Relation Age of Onset    Hypertension Mother     Heart disease Mother     Lung disease Father     Diabetes Sister     Diabetes Brother     Kidney disease Son        Review of patient's allergies indicates:   Allergen Reactions    Carvedilol Other (See Comments)     Bradycardia and syncope    Boniva [ibandronate]     Codeine     Hydralazine analogues     Iodinated contrast- oral and iv dye Hives    Kionex [sodium polystyrene sulfonate] Diarrhea     From encounter 12/11/17 for diarrhea--not true allergy.    Lisinopril     Morphine           Medication List           Accurate as of 10/11/18  2:32 PM. If you have any questions, ask your nurse or doctor.               CHANGE how you take these medications    nitroGLYCERIN 0.4 MG SL tablet  Commonly known as:  NITROSTAT  Place 1 tablet (0.4 mg total) under the tongue every 5 (five) minutes as needed for Chest pain. As needed  What changed:  additional instructions        CONTINUE taking these medications    acetaminophen 325 MG tablet  Commonly known as:  TYLENOL  Take 2 tablets (650 mg total) by mouth every 4 (four) hours as needed.     albuterol-ipratropium 2.5 mg-0.5  mg/3 mL nebulizer solution  Commonly known as:  DUO-NEB  USE ONE VIAL IN NEBULIZER EVERY 6 HOURS WHILE AWAKE     amLODIPine 5 MG tablet  Commonly known as:  NORVASC  Take 1 tablet (5 mg total) by mouth once daily.     apixaban 5 mg Tab  Commonly known as:  ELIQUIS  Take 1 tablet (5 mg total) by mouth 2 (two) times daily.     aspirin 81 MG EC tablet  Commonly known as:  ECOTRIN     calcium carbonate 500 mg calcium (1,250 mg) tablet  Commonly known as:  OS-TASIA     ciprofloxacin HCl 500 MG tablet  Commonly known as:  CIPRO  Take 1 tablet (500 mg total) by mouth every 12 (twelve) hours. for 7 days     ferrous sulfate 325 mg (65 mg iron) Tab tablet  Commonly known as:  FEOSOL  Take 1 tablet (325 mg total) by mouth 2 (two) times daily with meals.     fish oil-omega-3 fatty acids 300-1,000 mg capsule     fluticasone 50 mcg/actuation nasal spray  Commonly known as:  FLONASE  2 sprays by Each Nare route once daily.     fluticasone-vilanterol 100-25 mcg/dose diskus inhaler  Commonly known as:  BREO  Inhale 1 puff into the lungs once daily. Controller     folic acid-vit B6-vit B12 2.5-25-2 mg 2.5-25-2 mg Tab  Commonly known as:  FOLBIC or Equiv  Take 1 tablet by mouth once daily.     furosemide 40 MG tablet  Commonly known as:  LASIX  Take 0.5 tablets (20 mg total) by mouth every Mon, Wed, Fri.     gabapentin 300 MG capsule  Commonly known as:  NEURONTIN  Take 1 capsule (300 mg total) by mouth every evening.     levothyroxine 88 MCG tablet  Commonly known as:  SYNTHROID  Take 1 tablet (88 mcg total) by mouth before breakfast.     magnesium oxide 400 mg tablet  Commonly known as:  MAG-OX  TAKE ONE TABLET BY MOUTH ONCE DAILY     metroNIDAZOLE 250 MG tablet  Commonly known as:  FLAGYL  Take 1 tablet (250 mg total) by mouth every 8 (eight) hours. for 7 days     montelukast 10 mg tablet  Commonly known as:  SINGULAIR  Take 1 tablet (10 mg total) by mouth every evening.     multivitamin tablet  Commonly known as:  THERAGRAN  Take 1  "tablet by mouth once daily.     polyethylene glycol 17 gram Pwpk  Commonly known as:  GLYCOLAX  Take 17 g by mouth 2 (two) times daily.     sertraline 25 MG tablet  Commonly known as:  ZOLOFT  Take 1 tablet (25 mg total) by mouth once daily.     simvastatin 40 MG tablet  Commonly known as:  ZOCOR  TAKE ONE TABLET BY MOUTH ONCE DAILY IN THE EVENING     SITagliptin 25 MG Tab  Commonly known as:  JANUVIA  Take 1 tablet (25 mg total) by mouth once daily.     VITAMIN C 500 MG tablet  Generic drug:  ascorbic acid (vitamin C)     vitamin D 1000 units Tab  Commonly known as:  VITAMIN D3  Take 1 tablet (1,000 Units total) by mouth once daily.            Review of Systems  Constitution: Denies chills, fever, and sweats.  HENT: Denies headaches or blurry vision.  Cardiovascular: Denies chest pain or irregular heart beat.  Respiratory: Denies cough or shortness of breath.  Gastrointestinal: Denies abdominal pain, nausea, or vomiting.  Musculoskeletal: Denies muscle cramps.  Neurological: Denies dizziness or focal weakness.  Psychiatric/Behavioral: Normal mental status.  Hematologic/Lymphatic: Denies bleeding problem or easy bruising/bleeding.  Skin: Denies rash or suspicious lesions    Physical Examination  BP (!) 115/56 (BP Location: Left arm, Patient Position: Sitting)   Pulse 70   Ht 5' 3" (1.6 m)   Wt 59.5 kg (131 lb 2.8 oz)   LMP  (LMP Unknown)   SpO2 (!) 92%   BMI 23.24 kg/m²     Constitutional: No acute distress, conversant  HEENT: Sclera anicteric, Pupils equal, round and reactive to light, extraocular motions intact, Oropharynx clear  Neck: No JVD, no carotid bruits  Cardiovascular: regular rate and rhythm, no murmur, rubs or gallops, normal S1/S2  Pulmonary: Clear to auscultation bilaterally  Abdominal: Abdomen soft, nontender, nondistended, positive bowel sounds  Extremities: No lower extremity edema,   Pulses:  Carotid pulses are 2+ on the right side, and 2+ on the left side.  Radial pulses are 2+ on the right " side, and 2+ on the left side.     Skin: No ecchymosis, erythema, or ulcers  Psych: Alert and oriented x 3, appropriate affect  Neuro: CNII-XII intact, no focal deficits    Labs:  Most Recent Data  CBC:   Lab Results   Component Value Date    WBC 8.15 10/05/2018    HGB 8.4 (L) 10/05/2018    HCT 26.9 (L) 10/05/2018     10/05/2018     (H) 10/05/2018    RDW 13.5 10/05/2018     BMP:   Lab Results   Component Value Date     10/05/2018    K 4.0 10/05/2018     10/05/2018    CO2 22 (L) 10/05/2018    BUN 25 (H) 10/05/2018    CREATININE 1.6 (H) 10/05/2018     (H) 10/05/2018    CALCIUM 8.5 (L) 10/05/2018    MG 2.0 10/05/2018    PHOS 2.5 (L) 10/05/2018    PHOS 2.5 (L) 10/05/2018     LFTS;   Lab Results   Component Value Date    PROT 5.5 (L) 10/02/2018    ALBUMIN 2.8 (L) 10/05/2018    BILITOT 0.4 10/02/2018    AST 52 (H) 10/02/2018    ALKPHOS 70 10/02/2018    ALT 38 10/02/2018     COAGS:   Lab Results   Component Value Date    INR 1.0 09/11/2017     FLP:   Lab Results   Component Value Date    CHOL 149 05/23/2017    HDL 52 05/23/2017    LDLCALC 78.2 05/23/2017    TRIG 94 05/23/2017    CHOLHDL 34.9 05/23/2017     CARDIAC:   Lab Results   Component Value Date    TROPONINI <0.006 09/16/2018     (H) 06/08/2018       Imaging:    EKG:  Normal sinus rhythm.    Echo:  Conclusion     · Left ventricle ejection fraction is normal.  · Tricuspid valve shows mild regurgitation.  · Left ventricular diastolic filling is abnormal.  · Left atrium is moderately dilated.  · Left ventricle shows moderate concentric hypertrophy.  · Mitral Valve: The following structural abnormalities were observed: non-specific thickening. There is mild valve leaflet thickening. There is mild valve leaflet calcification. There is moderate mitral annular calcification. Mild stenosis.         I have personally reviewed these images and echo data    Assessment/Plan:  Blaire was seen today for hospital follow up.    Diagnoses and  all orders for this visit:    Hypertension associated with diabetes    Hyperlipidemia associated with type 2 diabetes mellitus    Left ventricular diastolic dysfunction with preserved systolic function    Hypertensive left ventricular hypertrophy, without heart failure        Continue on the current regimen of amlodipine.  I recommended that she should check her blood pressure on a regular interval.  Recommendations regarding healthy lifestyle, sodium restriction, exercise provided to the patient.    Follow-up in about 1 year (around 10/11/2019).        Alfredito Trevino MD,MRCP,RPVI,FACC,FSCAI.  Interventional Cardiology   Phone [phone]

## 2018-10-13 ENCOUNTER — PATIENT MESSAGE (OUTPATIENT)
Dept: FAMILY MEDICINE | Facility: CLINIC | Age: 83
End: 2018-10-13

## 2018-10-16 ENCOUNTER — TELEPHONE (OUTPATIENT)
Dept: ADMINISTRATIVE | Facility: CLINIC | Age: 83
End: 2018-10-16

## 2018-10-23 ENCOUNTER — DOCUMENTATION ONLY (OUTPATIENT)
Dept: FAMILY MEDICINE | Facility: CLINIC | Age: 83
End: 2018-10-23

## 2018-10-23 NOTE — PROGRESS NOTES
Pre-Visit Chart Review  For Appointment Scheduled on 10/24/2018    Health Maintenance Due   Topic Date Due    TETANUS VACCINE  07/21/1948    Eye Exam  10/30/2016    Foot Exam  11/29/2017    Lipid Panel  05/23/2018

## 2018-10-24 ENCOUNTER — OFFICE VISIT (OUTPATIENT)
Dept: FAMILY MEDICINE | Facility: CLINIC | Age: 83
End: 2018-10-24
Payer: MEDICARE

## 2018-10-24 VITALS
BODY MASS INDEX: 23.01 KG/M2 | DIASTOLIC BLOOD PRESSURE: 88 MMHG | HEART RATE: 70 BPM | HEIGHT: 63 IN | SYSTOLIC BLOOD PRESSURE: 150 MMHG | WEIGHT: 129.88 LBS | TEMPERATURE: 98 F

## 2018-10-24 DIAGNOSIS — I15.2 HYPERTENSION ASSOCIATED WITH DIABETES: Primary | ICD-10-CM

## 2018-10-24 DIAGNOSIS — E03.9 HYPOTHYROIDISM, UNSPECIFIED TYPE: ICD-10-CM

## 2018-10-24 DIAGNOSIS — N18.30 CKD (CHRONIC KIDNEY DISEASE), STAGE III: ICD-10-CM

## 2018-10-24 DIAGNOSIS — E11.59 HYPERTENSION ASSOCIATED WITH DIABETES: Primary | ICD-10-CM

## 2018-10-24 DIAGNOSIS — D50.9 IRON DEFICIENCY ANEMIA, UNSPECIFIED IRON DEFICIENCY ANEMIA TYPE: ICD-10-CM

## 2018-10-24 PROCEDURE — 99214 OFFICE O/P EST MOD 30 MIN: CPT | Mod: S$PBB,,, | Performed by: PHYSICIAN ASSISTANT

## 2018-10-24 PROCEDURE — 1101F PT FALLS ASSESS-DOCD LE1/YR: CPT | Mod: CPTII,,, | Performed by: PHYSICIAN ASSISTANT

## 2018-10-24 PROCEDURE — 99999 PR PBB SHADOW E&M-EST. PATIENT-LVL V: CPT | Mod: PBBFAC,,, | Performed by: PHYSICIAN ASSISTANT

## 2018-10-24 PROCEDURE — 99499 UNLISTED E&M SERVICE: CPT | Mod: S$GLB,,, | Performed by: PHYSICIAN ASSISTANT

## 2018-10-24 PROCEDURE — 99215 OFFICE O/P EST HI 40 MIN: CPT | Mod: PBBFAC,PO | Performed by: PHYSICIAN ASSISTANT

## 2018-10-24 RX ORDER — CEPHALEXIN 250 MG/1
CAPSULE ORAL
Status: ON HOLD | COMMUNITY
Start: 2018-10-16 | End: 2018-12-01 | Stop reason: HOSPADM

## 2018-10-24 NOTE — PROGRESS NOTES
Subjective:       Patient ID: Blaire Cage is a 88 y.o. female.    Chief Complaint: Follow-up    Patient with hypertension, hypothyroidism,  type 2 diabetes mellitus, and chronic kidney disease stage 3 presents as scheduled for routine follow-up.  The patient was actually hospitalized 10 days ago.  She is seen today also for hospital discharge. Patient was hospitalized for colitis.  She was treated with IV antibiotics.  She was found to be in a SHALINI and was treated with IV fluids.  Patient's thyroid medication was adjusted upon discharge. She continues to take oral iron for her iron deficiency.  She does have constipation secondary to oral iron.  She is using over-the-counter herbal tea with relief of her constipation.  She uses MiraLax on a daily basis and magnesium citrate as well as needed.  Patient has no acute complaints today.  She is due for her eye exam. Patients patient medical/surgical, social and family histories have been reviewed         Review of Systems   Constitutional: Negative for activity change, appetite change, fatigue and unexpected weight change.   Eyes: Negative for visual disturbance.   Respiratory: Negative for cough and shortness of breath.    Cardiovascular: Negative for chest pain, palpitations and leg swelling.   Gastrointestinal: Negative for abdominal pain, constipation, diarrhea and nausea.   Endocrine: Negative for polydipsia and polyuria.   Genitourinary: Negative for difficulty urinating.   Musculoskeletal: Negative for arthralgias.   Skin: Negative for rash.   Neurological: Negative for dizziness, light-headedness and headaches.   Psychiatric/Behavioral: Negative for dysphoric mood and sleep disturbance. The patient is not nervous/anxious.        Objective:      Physical Exam   Constitutional: She appears well-developed and well-nourished. She is cooperative. No distress.   Eyes: Conjunctivae and EOM are normal. Pupils are equal, round, and reactive to light. No scleral  icterus.   Cardiovascular: Normal rate, regular rhythm and normal heart sounds.   Pulses:       Dorsalis pedis pulses are 1+ on the right side, and 1+ on the left side.   Pulmonary/Chest: Effort normal and breath sounds normal.   Abdominal: Soft. Bowel sounds are normal.   Musculoskeletal:        Right lower leg: She exhibits no edema.        Left lower leg: She exhibits no edema.        Right foot: There is deformity.        Left foot: There is deformity.   Feet:   Right Foot:   Protective Sensation: 5 sites tested. 5 sites sensed.   Skin Integrity: Negative for skin breakdown.   Left Foot:   Protective Sensation: 5 sites tested. 5 sites sensed.   Skin Integrity: Negative for skin breakdown.   Neurological: She is alert.   Skin: Skin is warm and dry.   Psychiatric: She has a normal mood and affect. Her speech is normal. Judgment normal. Cognition and memory are normal.   Vitals reviewed.      Assessment:       1. Hypertension associated with diabetes    2. Hypothyroidism, unspecified type    3. Iron deficiency anemia due to chronic blood loss    4. BMI 23.0-23.9, adult        Plan:       Blaire was seen today for follow-up.    Diagnoses and all orders for this visit:    Hypertension associated with diabetes  -     Lipid panel; Future  -     Basic metabolic panel; Future  -     Ambulatory referral to Optometry    Hypothyroidism, unspecified type  -     repeat in 8 weeks TSH; Future    Iron deficiency anemia due to chronic blood loss  -     CBC auto differential; Future    BMI 23.0-23.9, adult    Patient readiness: acceptance and barriers:none    During the course of the visit the patient was educated and counseled about the following:     Diabetes:  Labs: hemoglobin A1C.  Hypertension:   Medication: no change.  Underweight:  Follow up and re-weight in: 8  weeks and as needed.     Goals: Diabetes: Maintain Hemoglobin A1C below 7, Hypertension: Reduce Blood Pressure and Underweight: Increase calorie intake and BMI       Did patient meet goals/outcomes: Yes    The following self management tools provided: declined    Patient Instructions (the written plan) was given to the patient/family.     Time spent with patient: 30 minutes    Barriers to medications present (no )    Adverse reactions to current medications (no)    Over the counter medications reviewed (Yes)

## 2018-11-06 NOTE — PROGRESS NOTES
[facility]  PROGRESS NOTE        Subjective:       Patient ID:   NAME: Blaire Cage : 1930     88 y.o. female    Referring Doc: Sandro  Other Physicians: Cande Garg (PA); Eli Edmonds (PCP); Jeffrey Christopher (PULm); Sandra (GI)    Chief Complaint:  DVT and PE f/u    History of Present Illness:     Patient returns today for a  regularly scheduled follow-up visit.  The patient is here today to go over the results of the recently ordered labs, tests and studies. She is here with her daughter Karolina and son. She is breathing ok. She denies any swelling in the legs. She denies any CP, SOB, HA's or N/V.             ROS:   GEN: normal without any fever, night sweats or weight loss  HEENT: normal with no HA's, sore throat, stiff neck, changes in vision  CV: normal with no CP, SOB, PND, WEST or orthopnea  PULM: normal with no SOB, cough, hemoptysis, sputum or pleuritic pain  GI: normal with no abdominal pain, nausea, vomiting, constipation, diarrhea, melanotic stools, BRBPR, or hematemesis  : normal with no hematuria, dysuria  BREAST: normal with no mass, discharge, pain  SKIN: normal with no rash, erythema, bruising, or swelling    Allergies:  Review of patient's allergies indicates:   Allergen Reactions    Carvedilol Other (See Comments)     Bradycardia and syncope    Boniva [ibandronate]     Codeine     Hydralazine analogues     Iodinated contrast- oral and iv dye Hives    Kionex [sodium polystyrene sulfonate] Diarrhea     From encounter 17 for diarrhea--not true allergy.    Lisinopril     Morphine        Medications:    Current Outpatient Medications:     acetaminophen (TYLENOL) 325 MG tablet, Take 2 tablets (650 mg total) by mouth every 4 (four) hours as needed., Disp: , Rfl: 0    albuterol-ipratropium (DUO-NEB) 2.5 mg-0.5 mg/3 mL nebulizer solution, USE ONE VIAL IN NEBULIZER EVERY 6 HOURS WHILE AWAKE, Disp: 120 vial, Rfl: 5    amLODIPine (NORVASC) 5 MG tablet, Take 1 tablet (5  mg total) by mouth once daily., Disp: 30 tablet, Rfl: 0    apixaban 5 mg Tab, Take 1 tablet (5 mg total) by mouth 2 (two) times daily., Disp: 60 tablet, Rfl: 3    ascorbic acid (VITAMIN C) 500 MG tablet, Take 500 mg by mouth once daily.  , Disp: , Rfl:     aspirin (ECOTRIN) 81 MG EC tablet, Take 81 mg by mouth once daily.  , Disp: , Rfl:     calcium carbonate (OS-TASIA) 500 mg calcium (1,250 mg) tablet, Take 1 tablet by mouth 2 (two) times daily.  , Disp: , Rfl:     cephALEXin (KEFLEX) 250 MG capsule, , Disp: , Rfl:     ferrous sulfate (FEOSOL) 325 mg (65 mg iron) Tab tablet, Take 1 tablet (325 mg total) by mouth 2 (two) times daily with meals., Disp: 180 tablet, Rfl: 3    fish oil-omega-3 fatty acids 300-1,000 mg capsule, Take 2 g by mouth every evening. , Disp: , Rfl:     fluticasone (FLONASE) 50 mcg/actuation nasal spray, 2 sprays by Each Nare route once daily., Disp: 48 g, Rfl: 3    folic acid-vit B6-vit B12 2.5-25-2 mg (FOLBIC OR EQUIV) 2.5-25-2 mg Tab, Take 1 tablet by mouth once daily., Disp: 30 tablet, Rfl: 6    furosemide (LASIX) 40 MG tablet, Take 0.5 tablets (20 mg total) by mouth every Mon, Wed, Fri., Disp: 30 tablet, Rfl: 0    gabapentin (NEURONTIN) 300 MG capsule, Take 1 capsule (300 mg total) by mouth every evening., Disp: 90 capsule, Rfl: 3    magnesium oxide (MAG-OX) 400 mg tablet, TAKE ONE TABLET BY MOUTH ONCE DAILY, Disp: 90 tablet, Rfl: 3    montelukast (SINGULAIR) 10 mg tablet, Take 1 tablet (10 mg total) by mouth every evening., Disp: 90 tablet, Rfl: 3    multivitamin (THERAGRAN) tablet, Take 1 tablet by mouth once daily., Disp: , Rfl:     nitroGLYCERIN (NITROSTAT) 0.4 MG SL tablet, Place 1 tablet (0.4 mg total) under the tongue every 5 (five) minutes as needed for Chest pain. As needed (Patient taking differently: Place 0.4 mg under the tongue every 5 (five) minutes as needed for Chest pain. Seek medical help if pain is not relieved after the third dose.), Disp: 30 tablet, Rfl:  3    polyethylene glycol (GLYCOLAX) 17 gram PwPk, Take 17 g by mouth 2 (two) times daily., Disp: 60 packet, Rfl: 0    sertraline (ZOLOFT) 25 MG tablet, Take 1 tablet (25 mg total) by mouth once daily., Disp: 30 tablet, Rfl: 0    simvastatin (ZOCOR) 40 MG tablet, TAKE ONE TABLET BY MOUTH ONCE DAILY IN THE EVENING, Disp: 90 tablet, Rfl: 3    SITagliptin (JANUVIA) 25 MG Tab, Take 1 tablet (25 mg total) by mouth once daily., Disp: 30 tablet, Rfl: 5    vitamin D 1000 units Tab, Take 1 tablet (1,000 Units total) by mouth once daily., Disp: 90 tablet, Rfl: 3    fluticasone-vilanterol (BREO) 100-25 mcg/dose diskus inhaler, Inhale 1 puff into the lungs once daily. Controller, Disp: 30 each, Rfl: 0    levothyroxine (SYNTHROID) 88 MCG tablet, Take 1 tablet (88 mcg total) by mouth before breakfast., Disp: 90 tablet, Rfl: 3    PMHx/PSHx Updates:  See patient's last visit with me on 8/7/2018.  See H&P on 6/29/2018        Pathology:  Cancer Staging  No matching staging information was found for the patient.          Objective:     Vitals:  Blood pressure (!) 152/63, pulse 71, temperature 98 °F (36.7 °C), resp. rate 20, weight 60.1 kg (132 lb 6.4 oz).    Physical Examination:   GEN: no apparent distress, comfortable; AAOx3  HEAD: atraumatic and normocephalic  EYES: no pallor, no icterus, PERRLA  ENT: OMM, no pharyngeal erythema, external ears WNL; no nasal discharge; no thrush  NECK: no masses, thyroid normal, trachea midline, no LAD/LN's, supple  CV: RRR with no murmur; normal pulse; normal S1 and S2; no pedal edema  CHEST: Normal respiratory effort; CTAB; normal breath sounds; no wheeze or crackles  ABDOM: nontender and nondistended; soft; normal bowel sounds; no rebound/guarding  MUSC/Skeletal: ROM normal; no crepitus; joints normal; no deformities or arthropathy; no longer on walker  EXTREM: no clubbing, cyanosis, inflammation or swelling  SKIN: no rashes, lesions, ulcers, petechiae or subcutaneous nodules; vitiligo    : no carter  NEURO: grossly intact; motor/sensory WNL; AAOx3; no tremors  PSYCH: normal mood, affect and behavior  LYMPH: normal cervical, supraclavicular, axillary and groin LN's             Labs:     10/5/2018  Lab Results   Component Value Date    WBC 8.15 10/05/2018    HGB 8.4 (L) 10/05/2018    HCT 26.9 (L) 10/05/2018     (H) 10/05/2018     10/05/2018         Lab Results   Component Value Date    IRON 34 09/26/2018    TIBC 300 09/26/2018    FERRITIN 393 (H) 10/04/2018               Radiology/Diagnostic Studies:    Ct Head Without Contrast    Result Date: 7/16/2018  EXAMINATION: CT HEAD WITHOUT CONTRAST CLINICAL HISTORY: Dizziness; TECHNIQUE: 5 mm noncontrast axial images were acquired through the head. COMPARISON: CT head without contrast-11/29/2014 FINDINGS: The brain is normally formed with preserved gray-white matter junction differentiation. No evidence of acute/recent major vascular territory cerebral infarction, parenchymal hemorrhage, or intra-axial mass.  There is age-appropriate cerebral volume loss with associated compensatory enlargement of the ventricular system and widening of the CSF spaces over both cerebral convexities.  There are confluent areas of periventricular white matter hypoattenuation compatible with age-appropriate chronic microvascular ischemic changes. No hydrocephalus.  No effacement of the skull-base cisterns.  No extra-axial fluid collections or blood products. The paranasal sinuses and mastoid air cells are clear.  There is rightward bony nasal septal deviation.  The visualized orbits are unremarkable.  The bony calvarium and visualized facial bones show no acute abnormality.     No acute intracranial abnormality appreciated.  No interval detrimental change in the imaging appearance of the brain when compared to the previous study. Electronically signed by: Aubrey Davis MD Date:    07/16/2018 Time:    08:01    Radiology Report    Result Date: 7/15/2018  Ordered by  an unspecified provider.      I have reviewed all available lab results and radiology reports.    Assessment/Plan:   (1) 88 y.o. female with diagnosis of a recent LLE DVT and Pulmonary emboli who has been referred by Dr Jo with [facility] for evaluation by medical hematology/oncology. She was hospitalized [facility]. She has never had any clots before. No family hx/of clots.    - factor panel, protein C and S, ATIII adequate  - antiphosphatidyl negative; LA negative  - homocysteine elevated at 20.2  - MTHFR-C heterozygous positive - discussed the genetic implications with her and her daughter  - prothrombin II negative  - she is on eliquis bid      (2) COPD/asthma; former smoker     (3) Left ventricular dysfunction     (4) HTN and hypercholesterolemia     (5) CRI - stage III     (6) Anemia - most likely a multifactorial process with iron deficiency diagnosis, anemia of chronic disorders, anemia of chronic renal  - hgb is at 8.4 and little lower- ferritin was 393  - B12 413 last time  - she is on iron and MVI    (7) DM and hypothyroidism            History of pulmonary embolism    Deep vein thrombosis (DVT) of proximal vein of left lower extremity, unspecified chronicity    Chronic anticoagulation    Other acute pulmonary embolism without acute cor pulmonale    Normocytic normochromic anemia  -     CBC auto differential; Standing  -     Comprehensive metabolic panel; Standing  -     Iron and TIBC; Standing  -     Ferritin; Standing  -     Vitamin B12; Standing  -     Folate; Standing  -     Occult blood x 1, stool; Future; Expected date: 11/07/2018  -     Occult blood x 1, stool; Future; Expected date: 11/07/2018  -     Occult blood x 1, stool; Future; Expected date: 11/07/2018  -     Ambulatory referral to Gastroenterology    Macrocytic anemia  -     CBC auto differential; Standing  -     Comprehensive metabolic panel; Standing  -     Iron and TIBC; Standing  -     Ferritin; Standing  -     Vitamin B12; Standing  -      Folate; Standing  -     Occult blood x 1, stool; Future; Expected date: 11/07/2018  -     Occult blood x 1, stool; Future; Expected date: 11/07/2018  -     Occult blood x 1, stool; Future; Expected date: 11/07/2018  -     Ambulatory referral to Gastroenterology    Iron deficiency anemia due to chronic blood loss  -     CBC auto differential; Standing  -     Comprehensive metabolic panel; Standing  -     Iron and TIBC; Standing  -     Ferritin; Standing  -     Vitamin B12; Standing  -     Folate; Standing  -     Occult blood x 1, stool; Future; Expected date: 11/07/2018  -     Occult blood x 1, stool; Future; Expected date: 11/07/2018  -     Occult blood x 1, stool; Future; Expected date: 11/07/2018  -     Ambulatory referral to Gastroenterology    Anemia, chronic renal failure, stage 3 (moderate)  -     CBC auto differential; Standing  -     Comprehensive metabolic panel; Standing  -     Iron and TIBC; Standing  -     Ferritin; Standing  -     Vitamin B12; Standing  -     Folate; Standing  -     Occult blood x 1, stool; Future; Expected date: 11/07/2018  -     Occult blood x 1, stool; Future; Expected date: 11/07/2018  -     Occult blood x 1, stool; Future; Expected date: 11/07/2018  -     Ambulatory referral to Gastroenterology    Anemia, chronic disease  -     CBC auto differential; Standing  -     Comprehensive metabolic panel; Standing  -     Iron and TIBC; Standing  -     Ferritin; Standing  -     Vitamin B12; Standing  -     Folate; Standing  -     Occult blood x 1, stool; Future; Expected date: 11/07/2018  -     Occult blood x 1, stool; Future; Expected date: 11/07/2018  -     Occult blood x 1, stool; Future; Expected date: 11/07/2018  -     Ambulatory referral to Gastroenterology    Anemia of chronic disease  -     CBC auto differential; Standing  -     Comprehensive metabolic panel; Standing  -     Iron and TIBC; Standing  -     Ferritin; Standing  -     Vitamin B12; Standing  -     Folate; Standing  -      Occult blood x 1, stool; Future; Expected date: 11/07/2018  -     Occult blood x 1, stool; Future; Expected date: 11/07/2018  -     Occult blood x 1, stool; Future; Expected date: 11/07/2018  -     Ambulatory referral to Gastroenterology    Anemia due to multiple mechanisms  -     CBC auto differential; Standing  -     Comprehensive metabolic panel; Standing  -     Iron and TIBC; Standing  -     Ferritin; Standing  -     Vitamin B12; Standing  -     Folate; Standing  -     Occult blood x 1, stool; Future; Expected date: 11/07/2018  -     Occult blood x 1, stool; Future; Expected date: 11/07/2018  -     Occult blood x 1, stool; Future; Expected date: 11/07/2018  -     Ambulatory referral to Gastroenterology    Homocysteinemia    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    Smoker, quit 5/2016, 25 pck-years    Tobacco dependence          PLAN:  1. Continue Folbic for the hyperhomocysteinemia  2. Continue eliquis and consider lifelong usage  3. Previously and again  discussed the genetic implications of the MTHFR-C in siblings and children  4. Check stool OBS and refer to GI for constipation  5. F/u with PCP, etc  6. Check labs monthly for now  RTC in 3 months  Fax note to Eli Edmonds MD; Guerline Jo    Discussion:       I spent over 25 mins of time with the patient. Reviewed results of the recently ordered labs, tests and studies; made directives with regards to the results. Over half of this time was spent couseling and coordinating care.    I have explained all of the above in detail and the patient understands all of the current recommendation(s). I have answered all of their questions to the best of my ability and to their complete satisfaction.   The patient is to continue with the current management plan.            Electronically signed by Issac Alvarez MD

## 2018-11-07 ENCOUNTER — OFFICE VISIT (OUTPATIENT)
Dept: HEMATOLOGY/ONCOLOGY | Facility: CLINIC | Age: 83
End: 2018-11-07
Payer: MEDICARE

## 2018-11-07 VITALS
WEIGHT: 132.38 LBS | BODY MASS INDEX: 23.45 KG/M2 | TEMPERATURE: 98 F | RESPIRATION RATE: 20 BRPM | HEART RATE: 71 BPM | DIASTOLIC BLOOD PRESSURE: 63 MMHG | SYSTOLIC BLOOD PRESSURE: 152 MMHG

## 2018-11-07 DIAGNOSIS — D63.8 ANEMIA OF CHRONIC DISEASE: ICD-10-CM

## 2018-11-07 DIAGNOSIS — Z79.01 CHRONIC ANTICOAGULATION: ICD-10-CM

## 2018-11-07 DIAGNOSIS — Z86.711 HISTORY OF PULMONARY EMBOLISM: Primary | ICD-10-CM

## 2018-11-07 DIAGNOSIS — R79.83 HOMOCYSTEINEMIA: ICD-10-CM

## 2018-11-07 DIAGNOSIS — I82.4Y2 DEEP VEIN THROMBOSIS (DVT) OF PROXIMAL VEIN OF LEFT LOWER EXTREMITY, UNSPECIFIED CHRONICITY: ICD-10-CM

## 2018-11-07 DIAGNOSIS — D50.0 IRON DEFICIENCY ANEMIA DUE TO CHRONIC BLOOD LOSS: ICD-10-CM

## 2018-11-07 DIAGNOSIS — N18.30 ANEMIA, CHRONIC RENAL FAILURE, STAGE 3 (MODERATE): ICD-10-CM

## 2018-11-07 DIAGNOSIS — Z15.89 HETEROZYGOUS MTHFR MUTATION C677T: ICD-10-CM

## 2018-11-07 DIAGNOSIS — F17.200 TOBACCO DEPENDENCE: ICD-10-CM

## 2018-11-07 DIAGNOSIS — D53.9 MACROCYTIC ANEMIA: ICD-10-CM

## 2018-11-07 DIAGNOSIS — D63.1 ANEMIA, CHRONIC RENAL FAILURE, STAGE 3 (MODERATE): ICD-10-CM

## 2018-11-07 DIAGNOSIS — D64.9 NORMOCYTIC NORMOCHROMIC ANEMIA: ICD-10-CM

## 2018-11-07 DIAGNOSIS — I26.99 OTHER ACUTE PULMONARY EMBOLISM WITHOUT ACUTE COR PULMONALE: ICD-10-CM

## 2018-11-07 DIAGNOSIS — F17.200 SMOKER: ICD-10-CM

## 2018-11-07 DIAGNOSIS — D63.8 ANEMIA, CHRONIC DISEASE: ICD-10-CM

## 2018-11-07 DIAGNOSIS — D64.89 ANEMIA DUE TO MULTIPLE MECHANISMS: ICD-10-CM

## 2018-11-07 PROCEDURE — 1101F PT FALLS ASSESS-DOCD LE1/YR: CPT | Mod: ,,, | Performed by: INTERNAL MEDICINE

## 2018-11-07 PROCEDURE — 99213 OFFICE O/P EST LOW 20 MIN: CPT | Mod: ,,, | Performed by: INTERNAL MEDICINE

## 2018-11-07 NOTE — LETTER
November 7, 2018      Eli Edmonds MD  [address] [facility]  [place] [address]           [facility] Hematology Oncology  [address] [facility]  [address]  [place] [address]  Phone: [phone]  Fax: [phone]          Patient: Blaire Cage   MR Number: [id number]   YOB: 1930   Date of Visit: 11/7/2018       Dear Dr. Eli Edmonds:    Thank you for referring Blaire aCge to me for evaluation. Attached you will find relevant portions of my assessment and plan of care.    If you have questions, please do not hesitate to call me. I look forward to following Blaire Cage along with you.    Sincerely,    Issac Alvarez MD    Enclosure  CC:  No Recipients    If you would like to receive this communication electronically, please contact [email or web address] or [phone] to request more information on [employer] Link access.    For providers and/or their staff who would like to refer a patient to [facility], please contact us through our one-stop-shop provider referral line, [facility], at [phone].    If you feel you have received this communication in error or would no longer like to receive these types of communications, please e-mail [email or web address]

## 2018-11-15 ENCOUNTER — TELEPHONE (OUTPATIENT)
Dept: FAMILY MEDICINE | Facility: CLINIC | Age: 83
End: 2018-11-15

## 2018-11-15 NOTE — TELEPHONE ENCOUNTER
----- Message from Israel Frias sent at 11/15/2018  9:13 AM CST -----  Contact: pt  Pt is requesting a refill on her Elquis 5 mg   Call Back#[phone]  Thanks    [facility] [address] [address]  [place] [address]  Phone: [phone] Fax: [phone]

## 2018-11-26 RX ORDER — AMLODIPINE BESYLATE 5 MG/1
5 TABLET ORAL DAILY
Qty: 90 TABLET | Refills: 3 | Status: ON HOLD | OUTPATIENT
Start: 2018-11-26 | End: 2018-12-01 | Stop reason: SDUPTHER

## 2018-11-29 ENCOUNTER — HOSPITAL ENCOUNTER (OUTPATIENT)
Facility: HOSPITAL | Age: 83
Discharge: HOME | End: 2018-12-01
Attending: EMERGENCY MEDICINE | Admitting: HOSPITALIST
Payer: MEDICARE

## 2018-11-29 DIAGNOSIS — R55 SYNCOPE: ICD-10-CM

## 2018-11-29 DIAGNOSIS — N28.9 RENAL INSUFFICIENCY: ICD-10-CM

## 2018-11-29 DIAGNOSIS — R55 RECURRENT SYNCOPE: Primary | ICD-10-CM

## 2018-11-29 PROBLEM — I50.32 DIASTOLIC CHF, CHRONIC: Status: ACTIVE | Noted: 2018-09-14

## 2018-11-29 LAB
ANION GAP SERPL CALC-SCNC: 13 MMOL/L
BASOPHILS # BLD AUTO: 0.1 K/UL
BASOPHILS NFR BLD: 0.7 %
BUN SERPL-MCNC: 67 MG/DL
CALCIUM SERPL-MCNC: 9.5 MG/DL
CHLORIDE SERPL-SCNC: 100 MMOL/L
CO2 SERPL-SCNC: 28 MMOL/L
CREAT SERPL-MCNC: 2 MG/DL
DIFFERENTIAL METHOD: ABNORMAL
EOSINOPHIL # BLD AUTO: 0.1 K/UL
EOSINOPHIL NFR BLD: 1.3 %
ERYTHROCYTE [DISTWIDTH] IN BLOOD BY AUTOMATED COUNT: 15.6 %
EST. GFR  (AFRICAN AMERICAN): 25 ML/MIN/1.73 M^2
EST. GFR  (NON AFRICAN AMERICAN): 22 ML/MIN/1.73 M^2
GLUCOSE SERPL-MCNC: 134 MG/DL
HCT VFR BLD AUTO: 31.5 %
HGB BLD-MCNC: 10.5 G/DL
LYMPHOCYTES # BLD AUTO: 1.1 K/UL
LYMPHOCYTES NFR BLD: 12.1 %
MCH RBC QN AUTO: 32.2 PG
MCHC RBC AUTO-ENTMCNC: 33.5 G/DL
MCV RBC AUTO: 96 FL
MONOCYTES # BLD AUTO: 0.8 K/UL
MONOCYTES NFR BLD: 9.5 %
NEUTROPHILS # BLD AUTO: 6.6 K/UL
NEUTROPHILS NFR BLD: 76.4 %
PLATELET # BLD AUTO: 195 K/UL
PMV BLD AUTO: 9.1 FL
POCT GLUCOSE: 196 MG/DL (ref 70–110)
POTASSIUM SERPL-SCNC: 3.8 MMOL/L
RBC # BLD AUTO: 3.27 M/UL
SODIUM SERPL-SCNC: 141 MMOL/L
TROPONIN I SERPL DL<=0.01 NG/ML-MCNC: <0.006 NG/ML
WBC # BLD AUTO: 8.7 K/UL

## 2018-11-29 PROCEDURE — 99215 OFFICE O/P EST HI 40 MIN: CPT | Mod: ,,, | Performed by: INTERNAL MEDICINE

## 2018-11-29 PROCEDURE — 85025 COMPLETE CBC W/AUTO DIFF WBC: CPT

## 2018-11-29 PROCEDURE — 36000 PLACE NEEDLE IN VEIN: CPT

## 2018-11-29 PROCEDURE — 93005 ELECTROCARDIOGRAM TRACING: CPT

## 2018-11-29 PROCEDURE — 25000003 PHARM REV CODE 250: Performed by: NURSE PRACTITIONER

## 2018-11-29 PROCEDURE — 80048 BASIC METABOLIC PNL TOTAL CA: CPT

## 2018-11-29 PROCEDURE — 27000221 HC OXYGEN, UP TO 24 HOURS

## 2018-11-29 PROCEDURE — 99285 EMERGENCY DEPT VISIT HI MDM: CPT | Mod: 25

## 2018-11-29 PROCEDURE — 94761 N-INVAS EAR/PLS OXIMETRY MLT: CPT

## 2018-11-29 PROCEDURE — 84484 ASSAY OF TROPONIN QUANT: CPT

## 2018-11-29 PROCEDURE — 36415 COLL VENOUS BLD VENIPUNCTURE: CPT

## 2018-11-29 PROCEDURE — G0378 HOSPITAL OBSERVATION PER HR: HCPCS

## 2018-11-29 RX ORDER — LANOLIN ALCOHOL/MO/W.PET/CERES
800 CREAM (GRAM) TOPICAL
Status: DISCONTINUED | OUTPATIENT
Start: 2018-11-29 | End: 2018-12-01 | Stop reason: HOSPADM

## 2018-11-29 RX ORDER — IBUPROFEN 200 MG
24 TABLET ORAL
Status: DISCONTINUED | OUTPATIENT
Start: 2018-11-29 | End: 2018-12-01 | Stop reason: HOSPADM

## 2018-11-29 RX ORDER — SODIUM,POTASSIUM PHOSPHATES 280-250MG
2 POWDER IN PACKET (EA) ORAL
Status: DISCONTINUED | OUTPATIENT
Start: 2018-11-29 | End: 2018-12-01 | Stop reason: HOSPADM

## 2018-11-29 RX ORDER — LEVOTHYROXINE SODIUM 88 UG/1
88 TABLET ORAL
Status: DISCONTINUED | OUTPATIENT
Start: 2018-11-30 | End: 2018-12-01 | Stop reason: HOSPADM

## 2018-11-29 RX ORDER — GLUCAGON 1 MG
1 KIT INJECTION
Status: DISCONTINUED | OUTPATIENT
Start: 2018-11-29 | End: 2018-12-01 | Stop reason: HOSPADM

## 2018-11-29 RX ORDER — ASPIRIN 81 MG/1
81 TABLET ORAL DAILY
Status: DISCONTINUED | OUTPATIENT
Start: 2018-11-29 | End: 2018-12-01 | Stop reason: HOSPADM

## 2018-11-29 RX ORDER — IBUPROFEN 200 MG
16 TABLET ORAL
Status: DISCONTINUED | OUTPATIENT
Start: 2018-11-29 | End: 2018-12-01 | Stop reason: HOSPADM

## 2018-11-29 RX ORDER — SODIUM CHLORIDE 9 MG/ML
INJECTION, SOLUTION INTRAVENOUS ONCE
Status: COMPLETED | OUTPATIENT
Start: 2018-11-29 | End: 2018-11-29

## 2018-11-29 RX ORDER — GABAPENTIN 300 MG/1
300 CAPSULE ORAL NIGHTLY
Status: DISCONTINUED | OUTPATIENT
Start: 2018-11-29 | End: 2018-12-01 | Stop reason: HOSPADM

## 2018-11-29 RX ORDER — POTASSIUM CHLORIDE 20 MEQ/15ML
40 SOLUTION ORAL
Status: DISCONTINUED | OUTPATIENT
Start: 2018-11-29 | End: 2018-12-01 | Stop reason: HOSPADM

## 2018-11-29 RX ORDER — SIMVASTATIN 40 MG/1
40 TABLET, FILM COATED ORAL NIGHTLY
Status: DISCONTINUED | OUTPATIENT
Start: 2018-11-29 | End: 2018-12-01 | Stop reason: HOSPADM

## 2018-11-29 RX ORDER — SODIUM CHLORIDE 0.9 % (FLUSH) 0.9 %
5 SYRINGE (ML) INJECTION
Status: DISCONTINUED | OUTPATIENT
Start: 2018-11-29 | End: 2018-12-01 | Stop reason: HOSPADM

## 2018-11-29 RX ORDER — SERTRALINE HYDROCHLORIDE 25 MG/1
25 TABLET, FILM COATED ORAL DAILY
Status: DISCONTINUED | OUTPATIENT
Start: 2018-11-29 | End: 2018-12-01 | Stop reason: HOSPADM

## 2018-11-29 RX ORDER — ACETAMINOPHEN 500 MG
1000 TABLET ORAL EVERY 6 HOURS PRN
Status: DISCONTINUED | OUTPATIENT
Start: 2018-11-29 | End: 2018-12-01 | Stop reason: HOSPADM

## 2018-11-29 RX ORDER — ONDANSETRON 2 MG/ML
8 INJECTION INTRAMUSCULAR; INTRAVENOUS EVERY 8 HOURS PRN
Status: DISCONTINUED | OUTPATIENT
Start: 2018-11-29 | End: 2018-12-01 | Stop reason: HOSPADM

## 2018-11-29 RX ADMIN — SIMVASTATIN 40 MG: 40 TABLET, FILM COATED ORAL at 08:11

## 2018-11-29 RX ADMIN — GABAPENTIN 300 MG: 300 CAPSULE ORAL at 08:11

## 2018-11-29 RX ADMIN — SODIUM CHLORIDE: 0.9 INJECTION, SOLUTION INTRAVENOUS at 04:11

## 2018-11-29 RX ADMIN — APIXABAN 5 MG: 2.5 TABLET, FILM COATED ORAL at 08:11

## 2018-11-29 NOTE — ED PROVIDER NOTES
"Encounter Date: 11/29/2018    SCRIBE #1 NOTE: I, Yuliana Giles, am scribing for, and in the presence of, Harpreet Dudley MD.       History     Chief Complaint   Patient presents with    Emesis     near syncope. Weakness       Time seen by provider: 10:56 AM on 11/29/2018    Blaire Cage is a 88 y.o. female with DM, HTN, CHF, COPD and prior PE on Apixaban who presents to the ED via EMS with an onset of passing out PTA. Per EMS, the patient took her morning medication including the medication (Mag-Ox), which she has not taken in "a while," with her coffee. She has had a similar episode and was hospitalized after she took her Mag-Ox. When the patient came to, the patient began to vomiting the coffee that she drank; she was given Zofran PTA and had a CBG of 138. Per daughter, the patient was unconscious for ~5 minutes, came to and then became unconscious a second time while on the phone with EMS. She states that she does not recall the recent events that occurred. The patient/daughter deny head trauma, difficulty urinating or any other symptoms at this time. No pertinent SHx noted. Carvedilol, Boniva, Codeine, Kionex, Hydralazine analogues and Iodine contrast drug allergies noted.      The history is provided by the patient, the EMS personnel and a relative (daughter).     Review of patient's allergies indicates:   Allergen Reactions    Carvedilol Other (See Comments)     Bradycardia and syncope    Boniva [ibandronate]     Codeine     Hydralazine analogues     Iodinated contrast- oral and iv dye Hives    Kionex [sodium polystyrene sulfonate] Diarrhea     From encounter 12/11/17 for diarrhea--not true allergy.    Lisinopril     Morphine      Past Medical History:   Diagnosis Date    Anemia due to multiple mechanisms 6/29/2018    Anemia, chronic disease 6/29/2018    Anemia, chronic renal failure, stage 3 (moderate) 6/29/2018    Anticoagulant long-term use     aspirin    Aortic aneurysm     CHF " (congestive heart failure)     COPD (chronic obstructive pulmonary disease)     COPD with acute exacerbation 1/9/2015    Diabetes mellitus type II     DVT (deep venous thrombosis) 06/09/2018    Encounter for blood transfusion     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 8/7/2018    Hip arthritis 3/1/2016    Homocysteinemia 8/7/2018    Hyperlipidemia     Hypertension     Normocytic normochromic anemia 6/29/2018    PE (pulmonary thromboembolism) 06/09/2018    Pneumonia of right lower lobe due to infectious organism 9/11/2017    Thyroid disease     hypothyroid     Past Surgical History:   Procedure Laterality Date    APPENDECTOMY      CHOLECYSTECTOMY      ESOPHAGOGASTRODUODENOSCOPY (EGD) N/A 10/25/2017    [facility] by [facility]    FRACTURE SURGERY      right hip     HERNIA REPAIR      groin    HYSTERECTOMY      VARICOSE VEIN SURGERY       Family History   Problem Relation Age of Onset    Hypertension Mother     Heart disease Mother     Lung disease Father     Diabetes Sister     Diabetes Brother     Kidney disease Son      Social History     Tobacco Use    Smoking status: Former Smoker     Packs/day: 0.35     Years: 44.00     Pack years: 15.40     Types: Cigarettes     Last attempt to quit: 4/30/2015     Years since quitting: 3.5    Smokeless tobacco: Never Used    Tobacco comment: 1/08/2015   Substance Use Topics    Alcohol use: No     Alcohol/week: 0.0 oz    Drug use: No     Review of Systems   Constitutional: Negative for chills and fever.   HENT: Negative for nosebleeds.    Eyes: Negative for visual disturbance.   Respiratory: Negative for cough and shortness of breath.    Cardiovascular: Negative for chest pain and palpitations.   Gastrointestinal: Positive for vomiting. Negative for abdominal pain, diarrhea and nausea.   Genitourinary: Negative for difficulty urinating, dysuria and hematuria.   Musculoskeletal: Negative for back pain and neck pain.   Skin: Negative  for rash.   Neurological: Positive for syncope (x2 episodes). Negative for seizures and headaches.     Physical Exam     Initial Vitals [11/29/18 1009]   BP Pulse Resp Temp SpO2   (!) 145/67 (!) 59 12 97.2 °F (36.2 °C) 99 %      MAP       --         Physical Exam    Nursing note and vitals reviewed.  Constitutional: She appears well-developed and well-nourished. She is not diaphoretic. No distress.   HENT:   Head: Normocephalic and atraumatic.   Mouth/Throat: Oropharynx is clear and moist.   Eyes: Conjunctivae are normal.   Neck: Neck supple.   Cardiovascular: Normal rate, regular rhythm, normal heart sounds and intact distal pulses. Exam reveals no gallop and no friction rub.    No murmur heard.  Pulses:       Radial pulses are 2+ on the right side, and 2+ on the left side.        Dorsalis pedis pulses are 2+ on the right side, and 2+ on the left side.        Posterior tibial pulses are 2+ on the right side, and 2+ on the left side.   Pulmonary/Chest: Breath sounds normal. She has no wheezes. She has no rhonchi. She has no rales.   Abdominal: Soft. She exhibits no distension. There is no tenderness.   Musculoskeletal: She exhibits no edema.   No peripheral edema.    Neurological: She is alert and oriented to person, place, and time. She has normal strength. No sensory deficit.   Cranial nerves III-XII intact. 5/5 strength and sensation.    Skin: No rash noted. No erythema.   Psychiatric: Her speech is normal.       ED Course   Procedures  Labs Reviewed   CBC W/ AUTO DIFFERENTIAL - Abnormal; Notable for the following components:       Result Value    RBC 3.27 (*)     Hemoglobin 10.5 (*)     Hematocrit 31.5 (*)     MCH 32.2 (*)     RDW 15.6 (*)     MPV 9.1 (*)     Gran% 76.4 (*)     Lymph% 12.1 (*)     All other components within normal limits   BASIC METABOLIC PANEL - Abnormal; Notable for the following components:    Glucose 134 (*)     BUN, Bld 67 (*)     Creatinine 2.0 (*)     eGFR if  25 (*)      eGFR if non  22 (*)     All other components within normal limits   TROPONIN I        Imaging Results          MRI Brain Without Contrast (Final result)  Result time 11/29/18 16:24:24    Final result by Benitez Valdes MD (11/29/18 16:24:24)                 Impression:      1. There is no acute abnormality.  There is generalized volume loss and nonspecific white matter change.  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  There is no hemorrhage, mass effect or acute infarction.      Electronically signed by: Benitez Valdes MD  Date:    11/29/2018  Time:    16:24             Narrative:    EXAM:  MRI BRAIN WITHOUT CONTRAST    CLINICAL HISTORY:  Syncope/fainting; .    COMPARISON:  Head CT dated 07/15/2018    FINDINGS:  Intracranial contents:There is no acute abnormality.  Specifically, there are no regions of restricted diffusion to suggest acute infarction.  There is no intracranial hemorrhage or mass.  There is encephalomalacia with adjacent gliosis in the inferior medial left cerebellum from remote infarction with a smaller similar infarction in the posterior left cerebellum.  There is moderate periventricular and subcortical white matter T2 prolongation.  These changes correspond to regions of decreased attenuation on the head CT and although are nonspecific, likely reflect sequelae of chronic small vessel disease.  There is no hydrocephalus or midline shift.  There is no abnormal extra-axial fluid collection.  There is also a small focal region of encephalomalacia high in the posteromedial right parietal lobe.  The basilar cisterns are open.  There is abnormal signal intensity in the left vertebral artery suggesting critical stenosis or occlusion but this is chronic and is likely responsible for the remote cerebellar infarctions.  Otherwise, the right vertebral artery, the basilar artery and the internal carotid arteries are patent.  The cerebellar  tonsils are in normal position.    Extracranial contents, calvarium, soft tissues:Baseline marrow signal intensity is normal.  There is scattered mucosal thickening in the paranasal sinuses.  The mastoid air cells are clear.                            (ordered by medicine after admission)     Medical Decision Making:   History:   Old Medical Records: I decided to obtain old medical records.  Clinical Tests:   Lab Tests: Ordered and Reviewed  Medical Tests: Reviewed and Ordered            Scribe Attestation:   Scribe #1: I performed the above scribed service and the documentation accurately describes the services I performed. I attest to the accuracy of the note.    I, Dr. Harpreet Dudley, personally performed the services described in this documentation. All medical record entries made by the scribe were at my direction and in my presence.  I have reviewed the chart and agree that the record reflects my personal performance and is accurate and complete. Harpreet Dudley MD.  5:08 PM 11/29/2018    Blaire Cage is a 88 y.o. female presenting with syncopal episode in this patient with multiple comorbidities.  She is asymptomatic since arrival.  There was 1 episode of emesis.  No sign of AC at this point.  Very low suspicion for emergent intra-abdominal process such as obstruction, perforation.  I do not think further imaging or surgical consultation is indicated.  Very low suspicion for acute intracranial process such as CVA.  I do not think brain imaging is indicated.  I have discussed with hospital medicine who will assume care as family is in favor of admission after lengthy discussion.  Mild renal insufficiency noted with IVF planned.  Positional etiology with orthostasis considered.        ED Course as of Nov 29 1709   Thu Nov 29, 2018   1047 EKG:  Sinus bradycardia, rate of 59, normal intervals and axis.  Old T-wave inversion aVL.  No new ST/T wave changes compared to last prior; no sign of acute  ischemia or infarction.  No sign of arrhythmia.    [MR]      ED Course User Index  [MR] Harpreet Dudley MD     Clinical Impression:   The primary encounter diagnosis was Renal insufficiency. A diagnosis of Syncope was also pertinent to this visit.      Disposition:   Disposition: Admitted                        Harpreet Dudley MD  11/29/18 1447

## 2018-11-29 NOTE — ED NOTES
Pt does not want to change into a gown at this time, and states she will change after she goes upstairs for admission.

## 2018-11-29 NOTE — CONSULTS
"[facility]  Cardiology  Consult Note    Patient Name: Blaire Cage  MRN: [id number]  Admission Date: 11/29/2018  Hospital Length of Stay: 0 days  Code Status: Full Code   Attending Provider:  Dr VICKI Zuñiga  Consulting Provider: JUAN C Alvarez  Primary Care Physician: Eli Edmonds MD  Principal Problem:<principal problem not specified>    Patient information was obtained from patient, relative(s), past medical records and ER records.     She is known to Dr Trevino with last office visit 10/11/2018. At that time she was doing well.  Last visit with Dr. Herbert in 2/2017  For HTN, nausea, weakness, tachycardia and dehydration, ED visit on 2/2/2017  PCP: Dr. Mauricio, now Dr. Edmonds see every 3-4 months  Prior cardiologist: Dr. Fisher, last seen 10/2015  Lives alone, no pets, pet sit daughter's dog, 3 daughters in the area, Trudi, works at [employer], have own POA. 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    Subjective:     Chief Complaint:  Orthostasis.    HPI:  Ms Blaire Cage has a hx of HTN, Hyperlipidemia, DM 2, Chronic diastolic dysfunction with preserved systolic function, Hx of PE on long term anticoagulation and Hypertensive LV hypertrophy without CHF. See Echo from April below.  She presents to the Ed after she calls "karen". She states she was in her usual state of health and had got up that am to get ready for a podiatrist appt for a sore toenail. Her daughter got there and they had coffee on the porch and were talking about sad family stories which always upsets her. They went in to the kitchen and were sitting at the counter for about 20 " "minutes. She states she began to feel "woozy in her head" and then put her head on counter and can't remember what happened. She states she has had these fainting spells since she was very young. Denies any headache, CP, palpitations or her heart slowing prior to episode. According to ED note daughter indicates she was out for less then 5 minutes then had a second episode.HEr BG was reported at 138 by EMS. She also vomited up her cup of coffee. While in the ED her BP initially was 164 systolic and dropped to 108 systolic so concern about orthostasis. Her BP meds include Lasix 20 mg and norvasc 5 mg. Pt also expressed concern that she had stopped her mag ox as she thought it was making her sick. This am was the first time in a while that she had taken it. Also states her BP's when checked at home have been "wonderful". Currently sitting up in bed eating supper with no concerns voiced. EKG shows NSB rate 59, troponin negative.     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 8/7/2018    Hip arthritis 3/1/2016    Homocysteinemia 8/7/2018    Hyperlipidemia     Hypertension     Normocytic normochromic anemia 6/29/2018    PE (pulmonary thromboembolism) 06/09/2018    Pneumonia of right lower lobe due to infectious organism 9/11/2017    Thyroid disease     hypothyroid       Past Surgical History:   Procedure Laterality Date    APPENDECTOMY      CHOLECYSTECTOMY      ESOPHAGOGASTRODUODENOSCOPY (EGD) N/A 10/25/2017    Performed by Fadi" INDERJIT [facility]O    FRACTURE SURGERY      right hip     HERNIA REPAIR      groin    HYSTERECTOMY      VARICOSE VEIN SURGERY         Review of patient's allergies indicates:   Allergen Reactions    Carvedilol Other (See Comments)     Bradycardia and syncope    Boniva [ibandronate]     Codeine     Hydralazine analogues     Iodinated contrast- oral and iv dye Hives    Kionex [sodium polystyrene sulfonate] Diarrhea     From encounter 12/11/17 for diarrhea--not true allergy.    Lisinopril     Morphine        No current facility-administered medications on file prior to encounter.      Current Outpatient Medications on File Prior to Encounter   Medication Sig    albuterol-ipratropium (DUO-NEB) 2.5 mg-0.5 mg/3 mL nebulizer solution USE ONE VIAL IN NEBULIZER EVERY 6 HOURS WHILE AWAKE    amLODIPine (NORVASC) 5 MG tablet Take 1 tablet (5 mg total) by mouth once daily.    apixaban (ELIQUIS) 5 mg Tab Take 1 tablet (5 mg total) by mouth 2 (two) times daily.    ascorbic acid (VITAMIN C) 500 MG tablet Take 500 mg by mouth once daily.      aspirin (ECOTRIN) 81 MG EC tablet Take 81 mg by mouth once daily.      calcium carbonate (OS-TASIA) 500 mg calcium (1,250 mg) tablet Take 1 tablet by mouth 2 (two) times daily.      ferrous sulfate (FEOSOL) 325 mg (65 mg iron) Tab tablet Take 1 tablet (325 mg total) by mouth 2 (two) times daily with meals.    fish oil-omega-3 fatty acids 300-1,000 mg capsule Take 2 g by mouth every evening.     fluticasone (FLONASE) 50 mcg/actuation nasal spray 2 sprays by Each Nare route once daily.    folic acid-vit B6-vit B12 2.5-25-2 mg (FOLBIC OR EQUIV) 2.5-25-2 mg Tab Take 1 tablet by mouth once daily.    furosemide (LASIX) 40 MG tablet Take 0.5 tablets (20 mg total) by mouth every Mon, Wed, Fri.    gabapentin (NEURONTIN) 300 MG capsule Take 1 capsule (300 mg total) by mouth every evening.    levothyroxine (SYNTHROID) 88 MCG tablet Take 1 tablet (88 mcg total) by mouth  before breakfast.    magnesium oxide (MAG-OX) 400 mg tablet TAKE ONE TABLET BY MOUTH ONCE DAILY    montelukast (SINGULAIR) 10 mg tablet Take 1 tablet (10 mg total) by mouth every evening.    multivitamin (THERAGRAN) tablet Take 1 tablet by mouth once daily.    polyethylene glycol (GLYCOLAX) 17 gram PwPk Take 17 g by mouth 2 (two) times daily.    simvastatin (ZOCOR) 40 MG tablet TAKE ONE TABLET BY MOUTH ONCE DAILY IN THE EVENING    SITagliptin (JANUVIA) 25 MG Tab Take 1 tablet (25 mg total) by mouth once daily.    vitamin D 1000 units Tab Take 1 tablet (1,000 Units total) by mouth once daily.    acetaminophen (TYLENOL) 325 MG tablet Take 2 tablets (650 mg total) by mouth every 4 (four) hours as needed.    cephALEXin (KEFLEX) 250 MG capsule     nitroGLYCERIN (NITROSTAT) 0.4 MG SL tablet Place 1 tablet (0.4 mg total) under the tongue every 5 (five) minutes as needed for Chest pain. As needed (Patient taking differently: Place 0.4 mg under the tongue every 5 (five) minutes as needed for Chest pain. Seek medical help if pain is not relieved after the third dose.)    sertraline (ZOLOFT) 25 MG tablet Take 1 tablet (25 mg total) by mouth once daily.    [DISCONTINUED] fluticasone-vilanterol (BREO) 100-25 mcg/dose diskus inhaler Inhale 1 puff into the lungs once daily. Controller     Family History     Problem Relation (Age of Onset)    Diabetes Sister, Brother    Heart disease Mother    Hypertension Mother    Kidney disease Son    Lung disease Father        Tobacco Use    Smoking status: Former Smoker     Packs/day: 0.35     Years: 44.00     Pack years: 15.40     Types: Cigarettes     Last attempt to quit: 4/30/2015     Years since quitting: 3.5    Smokeless tobacco: Never Used    Tobacco comment: 1/08/2015   Substance and Sexual Activity    Alcohol use: No     Alcohol/week: 0.0 oz    Drug use: No    Sexual activity: No     Review of Systems   Constitution: Negative.   HENT: Negative.    Eyes: Negative.     Cardiovascular: Positive for syncope. Negative for chest pain, irregular heartbeat and palpitations.   Respiratory: Negative.    Skin: Negative.    Musculoskeletal: Negative.    Gastrointestinal: Positive for vomiting.   Genitourinary: Negative.    Neurological: Positive for light-headedness. Negative for dizziness and headaches.   Psychiatric/Behavioral: Negative.       Objective:     Vital Signs (Most Recent):  Temp: 97.7 °F (36.5 °C) (11/29/18 1602)  Pulse: 66 (11/29/18 1602)  Resp: 18 (11/29/18 1602)  BP: (!) 159/67 (11/29/18 1602)  SpO2: 100 % (11/29/18 1602) Vital Signs (24h Range):  Temp:  [96.1 °F (35.6 °C)-97.7 °F (36.5 °C)] 97.7 °F (36.5 °C)  Pulse:  [59-70] 66  Resp:  [12-18] 18  SpO2:  [97 %-100 %] 100 %  BP: (108-165)/(55-79) 159/67     Weight: 63.7 kg (140 lb 6.9 oz)  Body mass index is 24.88 kg/m².    SpO2: 100 %  O2 Device (Oxygen Therapy): room air    No intake or output data in the 24 hours ending 11/29/18 1606    Lines/Drains/Airways     Peripheral Intravenous Line                 Peripheral IV - Single Lumen 11/29/18 Left Antecubital less than 1 day                Physical Exam   Constitutional: She is oriented to person, place, and time. She appears well-developed and well-nourished.   HENT:   Head: Normocephalic.   Eyes: Conjunctivae are normal. Pupils are equal, round, and reactive to light.   Neck: Normal range of motion. Neck supple. No thyromegaly present.   Cardiovascular: Normal rate, regular rhythm and normal heart sounds.   Pulmonary/Chest: Effort normal and breath sounds normal. No respiratory distress.   Abdominal: Soft. Bowel sounds are normal.   Musculoskeletal: Normal range of motion.   Neurological: She is alert and oriented to person, place, and time.   Skin: Skin is warm and dry.   Psychiatric: She has a normal mood and affect. Her behavior is normal. Thought content normal.       Significant Labs:   BMP:   Recent Labs   Lab 11/29/18  1116   *      K 3.8       CO2 28   BUN 67*   CREATININE 2.0*   CALCIUM 9.5   , CMP   Recent Labs   Lab 11/29/18  1116      K 3.8      CO2 28   *   BUN 67*   CREATININE 2.0*   CALCIUM 9.5   ANIONGAP 13   ESTGFRAFRICA 25*   EGFRNONAA 22*   , CBC   Recent Labs   Lab 11/29/18  1116   WBC 8.70   HGB 10.5*   HCT 31.5*      , INR No results for input(s): INR, PROTIME in the last 48 hours. and Troponin   Recent Labs   Lab 11/29/18  1116   TROPONINI <0.006       Significant Imaging:    Echo:  · Left ventricle ejection fraction is normal.  · Tricuspid valve shows mild regurgitation.  · Left ventricular diastolic filling is abnormal.  · Left atrium is moderately dilated.  · Left ventricle shows moderate concentric hypertrophy.  · Mitral Valve: The following structural abnormalities were observed: non-specific thickening. There is mild valve leaflet thickening. There is mild valve leaflet calcification. There is moderate mitral annular calcification. Mild stenosis.     MRI:  There is no acute abnormality.  There is generalized volume loss and nonspecific white matter change.  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  There is no hemorrhage, mass effect or acute infarction.       Assessment and Plan:     Syncope with pre-renal azotemia on 3 times weekly Lasix with polyuria  Monitor for further episodes  Maintain fall risk  May require PT for gait assessment due to her anticoagulation for PE hx.  Would hold diuretic and encourage adequate hydration  This is a recurrent problem and she lives alone, need to consider other options, at minimum would recommend Life Alert service.    Orthostasis  Monitor orthostatics BID, as well monitor BP's.  Keep on telemetry for any arrthymia  Could consider switching her Norvasc till bedtime     Hypertension associated with diabetes  BP currently stable but will need to monitor and adjust as needed.     Hyperlipidemia associated with type 2 diabetes  mellitus  Continue statin therapy    Left ventricular diastolic dysfunction with preserved systolic function  Continue to monitor for any HF symptoms. Currently denying any SOB or edema.    Hypertensive left ventricular hypertrophy, without heart failure  As per above.    Long term use of anticoagulation for hx of PE  Continue current anticoagluation  Await MRI results.      Active Diagnoses:    Diagnosis Date Noted POA    Syncope x 4, 8/24/2016, 9/20/2016. 10/2016, 11/2016 [R55] 08/24/2016 Yes      Problems Resolved During this Admission:       VTE Risk Mitigation (From admission, onward)        Ordered     apixaban tablet 5 mg  2 times daily      11/29/18 9625          Thank you for your consult.    Catarina Ballesteros, ADIRP  Cardiology   [facility]

## 2018-11-29 NOTE — H&P
"[facility] Hospital Medicine  History & Physical    Patient Name: Blaire Cage  MRN: [id number]  Admission Date: 2018  Attending Physician: Sadie Saeed NP  Primary Care Provider: Eli Edmonds MD         Patient information was obtained from patient, relative(s) and ER records.     Subjective:     Principal Problem:Syncopal episodes    Chief Complaint:   Chief Complaint   Patient presents with    Emesis     near syncope. Weakness        HPI: Blaire Cage is a 88 y.o. female with DM type 2, Hypertension, diastolic HF, COPD and prior PE on Apixaban who presents to the ED for evaluation of syncope this morning.  She states she was sitting at the table and "just didn't feel good with lightheadedness" and passed out associated with nausea and vomiting x 1.  According to her daughter, the patient was unconscious for ~5 minutes, came to and then became unconscious a second time while on the phone with EMS.  Patient states she does not recall passing out. The patient/daughter deny head trauma, difficulty urinating or any other symptoms at this time. Patient reports "I have been having fainting my whole life, especially when I listening or talking about sad things." Patient attributes this syncopal episode to "talking about sad stuff. Her friends daughter just  in car accident at age 23."  She is feeling better presently.  She has been admitted in the past with syncopal events and is followed by cardiology.   She states she took her home medications which include Lasix and Norvasc.  Orthostatic vitals in ED were positive.   She is known to Dr. Trevino             Past Medical History:   Diagnosis Date    Anemia due to multiple mechanisms 2018    Anemia, chronic disease 2018    Anemia, chronic renal failure, stage 3 (moderate) 2018    Anticoagulant long-term use     aspirin    Aortic aneurysm     CHF (congestive heart failure)     COPD (chronic obstructive " pulmonary disease)     COPD with acute exacerbation 1/9/2015    Diabetes mellitus type II     DVT (deep venous thrombosis) 06/09/2018    Encounter for blood transfusion     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 8/7/2018    Hip arthritis 3/1/2016    Homocysteinemia 8/7/2018    Hyperlipidemia     Hypertension     Normocytic normochromic anemia 6/29/2018    PE (pulmonary thromboembolism) 06/09/2018    Pneumonia of right lower lobe due to infectious organism 9/11/2017    Thyroid disease     hypothyroid       Past Surgical History:   Procedure Laterality Date    APPENDECTOMY      CHOLECYSTECTOMY      ESOPHAGOGASTRODUODENOSCOPY (EGD) N/A 10/25/2017    [facility]formed by [facility]    FRACTURE SURGERY      right hip     HERNIA REPAIR      groin    HYSTERECTOMY      VARICOSE VEIN SURGERY         Review of patient's allergies indicates:   Allergen Reactions    Carvedilol Other (See Comments)     Bradycardia and syncope    Boniva [ibandronate]     Codeine     Hydralazine analogues     Iodinated contrast- oral and iv dye Hives    Kionex [sodium polystyrene sulfonate] Diarrhea     From encounter 12/11/17 for diarrhea--not true allergy.    Lisinopril     Morphine        No current facility-administered medications on file prior to encounter.      Current Outpatient Medications on File Prior to Encounter   Medication Sig    albuterol-ipratropium (DUO-NEB) 2.5 mg-0.5 mg/3 mL nebulizer solution USE ONE VIAL IN NEBULIZER EVERY 6 HOURS WHILE AWAKE    amLODIPine (NORVASC) 5 MG tablet Take 1 tablet (5 mg total) by mouth once daily.    apixaban (ELIQUIS) 5 mg Tab Take 1 tablet (5 mg total) by mouth 2 (two) times daily.    ascorbic acid (VITAMIN C) 500 MG tablet Take 500 mg by mouth once daily.      aspirin (ECOTRIN) 81 MG EC tablet Take 81 mg by mouth once daily.      calcium carbonate (OS-TASIA) 500 mg calcium (1,250 mg) tablet Take 1 tablet by mouth 2 (two) times daily.      ferrous  sulfate (FEOSOL) 325 mg (65 mg iron) Tab tablet Take 1 tablet (325 mg total) by mouth 2 (two) times daily with meals.    fish oil-omega-3 fatty acids 300-1,000 mg capsule Take 2 g by mouth every evening.     fluticasone (FLONASE) 50 mcg/actuation nasal spray 2 sprays by Each Nare route once daily.    folic acid-vit B6-vit B12 2.5-25-2 mg (FOLBIC OR EQUIV) 2.5-25-2 mg Tab Take 1 tablet by mouth once daily.    furosemide (LASIX) 40 MG tablet Take 0.5 tablets (20 mg total) by mouth every Mon, Wed, Fri.    gabapentin (NEURONTIN) 300 MG capsule Take 1 capsule (300 mg total) by mouth every evening.    levothyroxine (SYNTHROID) 88 MCG tablet Take 1 tablet (88 mcg total) by mouth before breakfast.    magnesium oxide (MAG-OX) 400 mg tablet TAKE ONE TABLET BY MOUTH ONCE DAILY    montelukast (SINGULAIR) 10 mg tablet Take 1 tablet (10 mg total) by mouth every evening.    multivitamin (THERAGRAN) tablet Take 1 tablet by mouth once daily.    polyethylene glycol (GLYCOLAX) 17 gram PwPk Take 17 g by mouth 2 (two) times daily.    simvastatin (ZOCOR) 40 MG tablet TAKE ONE TABLET BY MOUTH ONCE DAILY IN THE EVENING    SITagliptin (JANUVIA) 25 MG Tab Take 1 tablet (25 mg total) by mouth once daily.    vitamin D 1000 units Tab Take 1 tablet (1,000 Units total) by mouth once daily.    acetaminophen (TYLENOL) 325 MG tablet Take 2 tablets (650 mg total) by mouth every 4 (four) hours as needed.    cephALEXin (KEFLEX) 250 MG capsule     nitroGLYCERIN (NITROSTAT) 0.4 MG SL tablet Place 1 tablet (0.4 mg total) under the tongue every 5 (five) minutes as needed for Chest pain. As needed (Patient taking differently: Place 0.4 mg under the tongue every 5 (five) minutes as needed for Chest pain. Seek medical help if pain is not relieved after the third dose.)    sertraline (ZOLOFT) 25 MG tablet Take 1 tablet (25 mg total) by mouth once daily.    [DISCONTINUED] fluticasone-vilanterol (BREO) 100-25 mcg/dose diskus inhaler Inhale 1  puff into the lungs once daily. Controller     Family History     Problem Relation (Age of Onset)    Diabetes Sister, Brother    Heart disease Mother    Hypertension Mother    Kidney disease Son    Lung disease Father        Tobacco Use    Smoking status: Former Smoker     Packs/day: 0.35     Years: 44.00     Pack years: 15.40     Types: Cigarettes     Last attempt to quit: 4/30/2015     Years since quitting: 3.5    Smokeless tobacco: Never Used    Tobacco comment: 1/08/2015   Substance and Sexual Activity    Alcohol use: No     Alcohol/week: 0.0 oz    Drug use: No    Sexual activity: No     Review of Systems   Constitutional: Negative for diaphoresis, fatigue and fever.   HENT: Negative for facial swelling and postnasal drip.    Eyes: Negative for photophobia and visual disturbance.   Respiratory: Negative for cough and shortness of breath.    Cardiovascular: Negative for chest pain and leg swelling.   Gastrointestinal: Positive for nausea and vomiting. Negative for abdominal distention and abdominal pain.   Endocrine: Negative for polyuria.   Genitourinary: Negative for dysuria and flank pain.   Musculoskeletal: Negative for arthralgias and gait problem.   Skin: Negative for rash.   Neurological: Positive for syncope and light-headedness. Negative for speech difficulty and numbness.   Psychiatric/Behavioral: Negative for agitation and dysphoric mood.     Objective:     Vital Signs (Most Recent):  Temp: 97.7 °F (36.5 °C) (11/29/18 1602)  Pulse: 66 (11/29/18 1602)  Resp: 18 (11/29/18 1602)  BP: (!) 159/67 (11/29/18 1602)  SpO2: 99 % (11/29/18 1706) Vital Signs (24h Range):  Temp:  [96.1 °F (35.6 °C)-97.7 °F (36.5 °C)] 97.7 °F (36.5 °C)  Pulse:  [59-70] 66  Resp:  [12-18] 18  SpO2:  [97 %-100 %] 99 %  BP: (108-165)/(55-79) 159/67     Weight: 63.7 kg (140 lb 6.9 oz)  Body mass index is 24.88 kg/m².    Physical Exam   Constitutional: She is oriented to person, place, and time. She appears well-developed and  well-nourished.   HENT:   Head: Normocephalic and atraumatic.   Right Ear: External ear normal.   Left Ear: External ear normal.   Mouth/Throat: Oropharynx is clear and moist.   Eyes: Conjunctivae and EOM are normal. Pupils are equal, round, and reactive to light.   Neck: Normal range of motion. Neck supple.   Cardiovascular: Normal rate, regular rhythm and intact distal pulses.   Murmur (soft ASM) heard.  Pulmonary/Chest: Effort normal and breath sounds normal. She has no wheezes.   Abdominal: Soft. Bowel sounds are normal. There is no tenderness.   Musculoskeletal: Normal range of motion.   Neurological: She is alert and oriented to person, place, and time. Coordination normal.   Skin: Skin is warm and dry.   Psychiatric: She has a normal mood and affect. Her behavior is normal. Judgment normal.   Nursing note and vitals reviewed.        CRANIAL NERVES     CN III, IV, VI   Pupils are equal, round, and reactive to light.  Extraocular motions are normal.        Significant Labs:   BMP:   Recent Labs   Lab 11/29/18  1116   *      K 3.8      CO2 28   BUN 67*   CREATININE 2.0*   CALCIUM 9.5     CBC:   Recent Labs   Lab 11/29/18  1116   WBC 8.70   HGB 10.5*   HCT 31.5*          Significant Imaging: I have reviewed and interpreted all pertinent imaging results/findings within the past 24 hours.    Assessment/Plan:     * Syncopal episodes    Recurrent. Check orthostatics-- + orthostatic hypotension in ED  Normal saline x 1 Liter at 75 cc/hr  Hold norvasc and lasix  Consult cardiology  Check MRI brain r/o other etiology - no MRI brain in past.  telemetry       Diastolic CHF, chronic    Stable. Hold lasix due to mild dehydration and orthostatic hypotension.       Anemia, chronic renal failure, stage 3 (moderate)    Stable. Trend Hgb/Hct       History of pulmonary embolism    Resume home eliquis. Recent diagnosis       Chronic anticoagulation    Resume home Eliquis       Hypertensive left  ventricular hypertrophy, without heart failure           Hyperlipidemia associated with type 2 diabetes mellitus    Chronic. Resume home statin.  Hold oral hyperglycemic agents.   accuchecks Ac and hs. Insulin correction scale.        Hypertension associated with diabetes    Chronic. Hold hypertensives for now due to orthostatic hypotension       Hypothyroidism    Chronic. Resume home levothyroxine.       CKD (chronic kidney disease), stage III, eGFR 39, progressive 31    Baseline Cr 1.7.  currently  2.0.   Gentle IV hydration.         VTE Risk Mitigation (From admission, onward)        Ordered     apixaban tablet 5 mg  2 times daily      11/29/18 0002             Sadie Saeed NP  Department of Hospital Medicine   [facility]

## 2018-11-29 NOTE — PROGRESS NOTES
11/29/18 1706   Patient Assessment/Suction   Level of Consciousness (AVPU) alert   PRE-TX-O2-ETCO2   O2 Device (Oxygen Therapy) nasal cannula   $ Is the patient on Low Flow Oxygen? Yes   SpO2 99 %   Pulse Oximetry Type Intermittent   $ Pulse Oximetry - Multiple Charge Pulse Oximetry - Multiple

## 2018-11-29 NOTE — HPI
"This is a 88 y.o. female with DM type 2, Hypertension, diastolic HF, COPD, prior PE on Apixaban, and recurrent syncope who presents to the ED for evaluation of syncope that occurred this morning.  She states she was sitting at the table and "just didn't feel good with lightheadedness" and passed out associated with nausea and vomiting x 1.  According to her daughter, the patient was unconscious for approximately 5 minutes, came to and then became unconscious a second time while on the phone with EMS.  Patient states she does not recall passing out. The patient/daughter deny head trauma, difficulty urinating or any other symptoms at this time. Patient reports "I have been having fainting my whole life, especially when I listening or talking about sad things."  Patient has history of recurrent syncopal events in the past and is followed by cardiology. Patient was evaluated in the ER where her orthostatic vitals in ED were positive. Patient was placed in the hospital for further evaluation and treatment.            "

## 2018-11-29 NOTE — ASSESSMENT & PLAN NOTE
Recurrent. Check orthostatics-- + orthostatic hypotension in ED  Normal saline x 1 Liter at 75 cc/hr  Hold norvasc and lasix  Consult cardiology  Check MRI brain r/o other etiology - no MRI brain in past.  telemetry

## 2018-11-29 NOTE — NURSING
Pt arrived to unit via wheelchair. VSS-orthostatics completed from sitting to standing; pt refused to do lying because she was already comfortable sitting. O2 @ 2L NC. Pt oriented to room. Call light in reach, bed in lowest position, bed alarm set.

## 2018-11-29 NOTE — SUBJECTIVE & OBJECTIVE
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 8/7/2018    Hip arthritis 3/1/2016    Homocysteinemia 8/7/2018    Hyperlipidemia     Hypertension     Normocytic normochromic anemia 6/29/2018    PE (pulmonary thromboembolism) 06/09/2018    Pneumonia of right lower lobe due to infectious organism 9/11/2017    Thyroid disease     hypothyroid       Past Surgical History:   Procedure Laterality Date    APPENDECTOMY      CHOLECYSTECTOMY      ESOPHAGOGASTRODUODENOSCOPY (EGD) N/A 10/25/2017    [facility]    FRACTURE SURGERY      right hip     HERNIA REPAIR      groin    HYSTERECTOMY      VARICOSE VEIN SURGERY         Review of patient's allergies indicates:   Allergen Reactions    Carvedilol Other (See Comments)     Bradycardia and syncope    Boniva [ibandronate]     Codeine     Hydralazine analogues     Iodinated contrast- oral and iv dye Hives    Kionex [sodium polystyrene sulfonate] Diarrhea     From encounter 12/11/17 for diarrhea--not true allergy.    Lisinopril     Morphine        No current facility-administered medications on file prior to encounter.      Current Outpatient Medications on File Prior to Encounter   Medication Sig    albuterol-ipratropium (DUO-NEB) 2.5 mg-0.5 mg/3 mL nebulizer solution USE ONE VIAL IN NEBULIZER EVERY 6 HOURS WHILE AWAKE    amLODIPine (NORVASC) 5 MG tablet Take 1 tablet (5 mg total) by mouth once daily.    apixaban (ELIQUIS) 5 mg Tab Take 1 tablet (5  mg total) by mouth 2 (two) times daily.    ascorbic acid (VITAMIN C) 500 MG tablet Take 500 mg by mouth once daily.      aspirin (ECOTRIN) 81 MG EC tablet Take 81 mg by mouth once daily.      calcium carbonate (OS-TASIA) 500 mg calcium (1,250 mg) tablet Take 1 tablet by mouth 2 (two) times daily.      ferrous sulfate (FEOSOL) 325 mg (65 mg iron) Tab tablet Take 1 tablet (325 mg total) by mouth 2 (two) times daily with meals.    fish oil-omega-3 fatty acids 300-1,000 mg capsule Take 2 g by mouth every evening.     fluticasone (FLONASE) 50 mcg/actuation nasal spray 2 sprays by Each Nare route once daily.    folic acid-vit B6-vit B12 2.5-25-2 mg (FOLBIC OR EQUIV) 2.5-25-2 mg Tab Take 1 tablet by mouth once daily.    furosemide (LASIX) 40 MG tablet Take 0.5 tablets (20 mg total) by mouth every Mon, Wed, Fri.    gabapentin (NEURONTIN) 300 MG capsule Take 1 capsule (300 mg total) by mouth every evening.    levothyroxine (SYNTHROID) 88 MCG tablet Take 1 tablet (88 mcg total) by mouth before breakfast.    magnesium oxide (MAG-OX) 400 mg tablet TAKE ONE TABLET BY MOUTH ONCE DAILY    montelukast (SINGULAIR) 10 mg tablet Take 1 tablet (10 mg total) by mouth every evening.    multivitamin (THERAGRAN) tablet Take 1 tablet by mouth once daily.    polyethylene glycol (GLYCOLAX) 17 gram PwPk Take 17 g by mouth 2 (two) times daily.    simvastatin (ZOCOR) 40 MG tablet TAKE ONE TABLET BY MOUTH ONCE DAILY IN THE EVENING    SITagliptin (JANUVIA) 25 MG Tab Take 1 tablet (25 mg total) by mouth once daily.    vitamin D 1000 units Tab Take 1 tablet (1,000 Units total) by mouth once daily.    acetaminophen (TYLENOL) 325 MG tablet Take 2 tablets (650 mg total) by mouth every 4 (four) hours as needed.    cephALEXin (KEFLEX) 250 MG capsule     nitroGLYCERIN (NITROSTAT) 0.4 MG SL tablet Place 1 tablet (0.4 mg total) under the tongue every 5 (five) minutes as needed for Chest pain. As needed (Patient taking differently: Place  0.4 mg under the tongue every 5 (five) minutes as needed for Chest pain. Seek medical help if pain is not relieved after the third dose.)    sertraline (ZOLOFT) 25 MG tablet Take 1 tablet (25 mg total) by mouth once daily.    [DISCONTINUED] fluticasone-vilanterol (BREO) 100-25 mcg/dose diskus inhaler Inhale 1 puff into the lungs once daily. Controller     Family History     Problem Relation (Age of Onset)    Diabetes Sister, Brother    Heart disease Mother    Hypertension Mother    Kidney disease Son    Lung disease Father        Tobacco Use    Smoking status: Former Smoker     Packs/day: 0.35     Years: 44.00     Pack years: 15.40     Types: Cigarettes     Last attempt to quit: 4/30/2015     Years since quitting: 3.5    Smokeless tobacco: Never Used    Tobacco comment: 1/08/2015   Substance and Sexual Activity    Alcohol use: No     Alcohol/week: 0.0 oz    Drug use: No    Sexual activity: No     Review of Systems   Constitutional: Negative for diaphoresis, fatigue and fever.   HENT: Negative for facial swelling and postnasal drip.    Eyes: Negative for photophobia and visual disturbance.   Respiratory: Negative for cough and shortness of breath.    Cardiovascular: Negative for chest pain and leg swelling.   Gastrointestinal: Positive for nausea and vomiting. Negative for abdominal distention and abdominal pain.   Endocrine: Negative for polyuria.   Genitourinary: Negative for dysuria and flank pain.   Musculoskeletal: Negative for arthralgias and gait problem.   Skin: Negative for rash.   Neurological: Positive for syncope and light-headedness. Negative for speech difficulty and numbness.   Psychiatric/Behavioral: Negative for agitation and dysphoric mood.     Objective:     Vital Signs (Most Recent):  Temp: 97.7 °F (36.5 °C) (11/29/18 1602)  Pulse: 66 (11/29/18 1602)  Resp: 18 (11/29/18 1602)  BP: (!) 159/67 (11/29/18 1602)  SpO2: 99 % (11/29/18 1706) Vital Signs (24h Range):  Temp:  [96.1 °F (35.6  °C)-97.7 °F (36.5 °C)] 97.7 °F (36.5 °C)  Pulse:  [59-70] 66  Resp:  [12-18] 18  SpO2:  [97 %-100 %] 99 %  BP: (108-165)/(55-79) 159/67     Weight: 63.7 kg (140 lb 6.9 oz)  Body mass index is 24.88 kg/m².    Physical Exam   Constitutional: She is oriented to person, place, and time. She appears well-developed and well-nourished.   HENT:   Head: Normocephalic and atraumatic.   Right Ear: External ear normal.   Left Ear: External ear normal.   Mouth/Throat: Oropharynx is clear and moist.   Eyes: Conjunctivae and EOM are normal. Pupils are equal, round, and reactive to light.   Neck: Normal range of motion. Neck supple.   Cardiovascular: Normal rate, regular rhythm and intact distal pulses.   Murmur (soft ASM) heard.  Pulmonary/Chest: Effort normal and breath sounds normal. She has no wheezes.   Abdominal: Soft. Bowel sounds are normal. There is no tenderness.   Musculoskeletal: Normal range of motion.   Neurological: She is alert and oriented to person, place, and time. Coordination normal.   Skin: Skin is warm and dry.   Psychiatric: She has a normal mood and affect. Her behavior is normal. Judgment normal.   Nursing note and vitals reviewed.        CRANIAL NERVES     CN III, IV, VI   Pupils are equal, round, and reactive to light.  Extraocular motions are normal.        Significant Labs:   BMP:   Recent Labs   Lab 11/29/18  1116   *      K 3.8      CO2 28   BUN 67*   CREATININE 2.0*   CALCIUM 9.5     CBC:   Recent Labs   Lab 11/29/18  1116   WBC 8.70   HGB 10.5*   HCT 31.5*          Significant Imaging: I have reviewed and interpreted all pertinent imaging results/findings within the past 24 hours.

## 2018-11-30 PROBLEM — N17.9 AKI (ACUTE KIDNEY INJURY): Status: ACTIVE | Noted: 2018-11-30

## 2018-11-30 PROBLEM — R55 RECURRENT SYNCOPE: Status: ACTIVE | Noted: 2018-11-30

## 2018-11-30 PROBLEM — I95.1 ORTHOSTATIC HYPOTENSION: Status: ACTIVE | Noted: 2018-11-30

## 2018-11-30 PROBLEM — E86.0 DEHYDRATION: Status: ACTIVE | Noted: 2018-11-30

## 2018-11-30 LAB
ANION GAP SERPL CALC-SCNC: 9 MMOL/L
BASOPHILS # BLD AUTO: 0 K/UL
BASOPHILS NFR BLD: 0.4 %
BUN SERPL-MCNC: 63 MG/DL
CALCIUM SERPL-MCNC: 8.5 MG/DL
CHLORIDE SERPL-SCNC: 103 MMOL/L
CO2 SERPL-SCNC: 30 MMOL/L
CREAT SERPL-MCNC: 2 MG/DL
DIFFERENTIAL METHOD: ABNORMAL
EOSINOPHIL # BLD AUTO: 0.3 K/UL
EOSINOPHIL NFR BLD: 4 %
ERYTHROCYTE [DISTWIDTH] IN BLOOD BY AUTOMATED COUNT: 15.5 %
EST. GFR  (AFRICAN AMERICAN): 25 ML/MIN/1.73 M^2
EST. GFR  (NON AFRICAN AMERICAN): 22 ML/MIN/1.73 M^2
GLUCOSE SERPL-MCNC: 113 MG/DL
HCT VFR BLD AUTO: 27.7 %
HGB BLD-MCNC: 9.3 G/DL
LYMPHOCYTES # BLD AUTO: 1.8 K/UL
LYMPHOCYTES NFR BLD: 27.6 %
MCH RBC QN AUTO: 32.2 PG
MCHC RBC AUTO-ENTMCNC: 33.6 G/DL
MCV RBC AUTO: 96 FL
MONOCYTES # BLD AUTO: 0.7 K/UL
MONOCYTES NFR BLD: 10.2 %
NEUTROPHILS # BLD AUTO: 3.7 K/UL
NEUTROPHILS NFR BLD: 57.8 %
PLATELET # BLD AUTO: 160 K/UL
PMV BLD AUTO: 9.4 FL
POTASSIUM SERPL-SCNC: 4.3 MMOL/L
RBC # BLD AUTO: 2.9 M/UL
SODIUM SERPL-SCNC: 142 MMOL/L
WBC # BLD AUTO: 6.4 K/UL

## 2018-11-30 PROCEDURE — 25000003 PHARM REV CODE 250: Performed by: NURSE PRACTITIONER

## 2018-11-30 PROCEDURE — 99224 PR SUBSEQUENT OBSERVATION CARE,LEVEL I: CPT | Mod: ,,, | Performed by: INTERNAL MEDICINE

## 2018-11-30 PROCEDURE — G0378 HOSPITAL OBSERVATION PER HR: HCPCS

## 2018-11-30 PROCEDURE — 97165 OT EVAL LOW COMPLEX 30 MIN: CPT

## 2018-11-30 PROCEDURE — G8979 MOBILITY GOAL STATUS: HCPCS | Mod: CJ

## 2018-11-30 PROCEDURE — 36415 COLL VENOUS BLD VENIPUNCTURE: CPT

## 2018-11-30 PROCEDURE — 97162 PT EVAL MOD COMPLEX 30 MIN: CPT

## 2018-11-30 PROCEDURE — 80048 BASIC METABOLIC PNL TOTAL CA: CPT

## 2018-11-30 PROCEDURE — 85025 COMPLETE CBC W/AUTO DIFF WBC: CPT

## 2018-11-30 PROCEDURE — 97116 GAIT TRAINING THERAPY: CPT

## 2018-11-30 PROCEDURE — G8978 MOBILITY CURRENT STATUS: HCPCS | Mod: CK

## 2018-11-30 RX ORDER — SODIUM CHLORIDE 9 MG/ML
INJECTION, SOLUTION INTRAVENOUS CONTINUOUS
Status: DISCONTINUED | OUTPATIENT
Start: 2018-11-30 | End: 2018-12-01

## 2018-11-30 RX ADMIN — ASPIRIN 81 MG: 81 TABLET, COATED ORAL at 09:11

## 2018-11-30 RX ADMIN — APIXABAN 5 MG: 2.5 TABLET, FILM COATED ORAL at 09:11

## 2018-11-30 RX ADMIN — Medication 1 CAPSULE: at 11:11

## 2018-11-30 RX ADMIN — SODIUM CHLORIDE: 0.9 INJECTION, SOLUTION INTRAVENOUS at 04:11

## 2018-11-30 RX ADMIN — GABAPENTIN 300 MG: 300 CAPSULE ORAL at 08:11

## 2018-11-30 RX ADMIN — LEVOTHYROXINE SODIUM 88 MCG: 88 TABLET ORAL at 06:11

## 2018-11-30 RX ADMIN — APIXABAN 5 MG: 2.5 TABLET, FILM COATED ORAL at 08:11

## 2018-11-30 RX ADMIN — SIMVASTATIN 40 MG: 40 TABLET, FILM COATED ORAL at 08:11

## 2018-11-30 RX ADMIN — SERTRALINE 25 MG: 25 TABLET, FILM COATED ORAL at 09:11

## 2018-11-30 NOTE — DISCHARGE INSTRUCTIONS
Thank you for choosing [facility] for your medical care. The primary doctor who is taking care of you at the time of your discharge is Gaby Zuñiga MD.     You were admitted to the hospital with Syncopal episodes.     Please note your discharge instructions, including diet/activity restrictions, follow-up appointments, and medication changes.  If you have any questions about your medical issues, prescriptions, or any other questions, please feel free to contact the [facility] Medicine Dept at [phone] and we will help.    If you are previously with Home health, outpatient PT/OT or under a therapy program, you are cleared to return to those programs.    Please direct all long term medication refills and follow up to your primary care provider, Eli Edmonds MD. Thank you again for letting us take care of your health care needs.    Please note the following discharge instructions per your discharging physician-  Jennifer Portillo Np

## 2018-11-30 NOTE — PLAN OF CARE
Problem: Physical Therapy Goal  Goal: Physical Therapy Goal  Goals to be met by: 2018     Patient will increase functional independence with mobility by performin. Supine to sit with [place]  2. Sit to stand transfer with Stand-by Assistance  3. Gait  x 250 feet with Contact Guard Assistance using Rolling Walker.   4. Lower extremity exercise program x20 reps per handout, with assistance as needed    Outcome: Ongoing (interventions implemented as appropriate)  PT eval and treat completed. Pt c/o pain R little toe and has appointment to see podiatry yesterday. Gait with RW to hallways with IV/O2. Pt to benefit from RW at discharge

## 2018-11-30 NOTE — PT/OT/SLP EVAL
Physical Therapy Evaluation    Patient Name:  Blaire Cage   MRN:  [id number]    Recommendations:     Discharge Recommendations:  home   Discharge Equipment Recommendations: walker, rolling   Barriers to discharge: None    Assessment:     Blaire Cage is a 88 y.o. female admitted with a medical diagnosis of Syncopal episodes.  She presents with the following impairments/functional limitations:  weakness, impaired endurance, pain . Pt presents with R little toe pain limiting mobility and gait. Improved performance with use of RW. Pt will benefit from RW at discharge- pt has good family support, daughter, grandchildren in and out of her home..    Rehab Prognosis:  good; patient would benefit from acute skilled PT services to address these deficits and reach maximum level of function.      Recent Surgery: * No surgery found *      Plan:     During this hospitalization, patient to be seen 6 x/week to address the above listed problems via gait training, therapeutic activities, therapeutic exercises  · Plan of Care Expires:  12/14/18   Plan of Care Reviewed with: patient    Subjective     Communicated with nurse escamilla prior to session.  Patient found supine upon PT entry to room, agreeable to evaluation.    Pt stated she missed her podiatry appointment yeaterday  Pt stated does not use o2 all the time at home    Chief Complaint: R toe pain  Patient comments/goals: go home today  Pain/Comfort:  · Pain Rating 1: (R little toe pain- did not state pain scale)  · Location - Side 1: Right  · Location 1: fifth toe  · Pain Addressed 1: Reposition, Distraction    Patients cultural, spiritual, Shinto conflicts given the current situation:      Living Environment:  Home alone but has good family support  Prior to admission, patients level of function was independent.  Patient has the following equipment: none.  DME owned (not currently used): none.  Upon discharge, patient will have assistance from family.    Objective:      Patient found with: peripheral IV, telemetry, oxygen     General Precautions: Standard, fall   Orthopedic Precautions:N/A   Braces: N/A     Exams:  · RLE ROM: WFL  · RLE Strength: WFL  · LLE ROM: WFL  · LLE Strength: WFL    Functional Mobility:    · Bed Mobility:     · Rolling Right: independence  · Scooting: stand by assistance  · Supine to Sit: stand by assistance  · Sit to Supine: stand by assistance  · Transfers:     · Sit to Stand:  contact guard assistance with rolling walker  · Toilet Transfer: contact guard assistance with  rolling walker  using  Stand Pivot  · Gait: 150ft with RW O2/IV, offloading R little toe    AM-PAC 6 CLICK MOBILITY  Total Score:17       Therapeutic Activities and Exercises:   bathroom use to void with supervision, pad changed  Gait to hallways  Back to bed to rest toe  Encouraged ankle pumping exercises    Patient left HOB elevated with all lines intact and call button in reach.    GOALS:   Multidisciplinary Problems     Physical Therapy Goals        Problem: Physical Therapy Goal    Goal Priority Disciplines Outcome Goal Variances Interventions   Physical Therapy Goal     PT, PT/OT Ongoing (interventions implemented as appropriate)     Description:  Goals to be met by: 2018     Patient will increase functional independence with mobility by performin. Supine to sit with [place]  2. Sit to stand transfer with Stand-by Assistance  3. Gait  x 250 feet with Contact Guard Assistance using Rolling Walker.   4. Lower extremity exercise program x20 reps per handout, with assistance as needed                      History:     Past Medical History:   Diagnosis Date    Anemia due to multiple mechanisms 2018    Anemia, chronic disease 2018    Anemia, chronic renal failure, stage 3 (moderate) 2018    Anticoagulant long-term use     aspirin    Aortic aneurysm     CHF (congestive heart failure)     COPD (chronic obstructive pulmonary disease)     COPD with  acute exacerbation 1/9/2015    Diabetes mellitus type II     DVT (deep venous thrombosis) 06/09/2018    Encounter for blood transfusion     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 8/7/2018    Hip arthritis 3/1/2016    Homocysteinemia 8/7/2018    Hyperlipidemia     Hypertension     Normocytic normochromic anemia 6/29/2018    PE (pulmonary thromboembolism) 06/09/2018    Pneumonia of right lower lobe due to infectious organism 9/11/2017    Thyroid disease     hypothyroid       Past Surgical History:   Procedure Laterality Date    APPENDECTOMY      CHOLECYSTECTOMY      ESOPHAGOGASTRODUODENOSCOPY (EGD) N/A 10/25/2017    Performed by Fadi Flores MD at [facility]    FRACTURE SURGERY      right hip     HERNIA REPAIR      groin    HYSTERECTOMY      VARICOSE VEIN SURGERY         Clinical Decision Making:     History  Co-morbidities and personal factors that may impact the plan of care Examination  Body Structures and Functions, activity limitations and participation restrictions that may impact the plan of care Clinical Presentation   Decision Making/ Complexity Score   Co-morbidities:   [] Time since onset of injury / illness / exacerbation  [] Status of current condition  []Patient's cognitive status and safety concerns    [] Multiple Medical Problems (see med hx)  Personal Factors:   [] Patient's age  [] Prior Level of function   [] Patient's home situation (environment and family support)  [] Patient's level of motivation  [] Expected progression of patient      HISTORY:(criteria)    [] 24392 - no personal factors/history    [] 42493 - has 1-2 personal factor/comorbidity     [] 00722 - has >3 personal factor/comorbidity     Body Regions:  [] Objective examination findings  [] Head     []  Neck  [] Trunk   [] Upper Extremity  [] Lower Extremity    Body Systems:  [] For communication ability, affect, cognition, language, and learning style: the assessment of the ability to make needs known, consciousness,  orientation (person, place, and time), expected emotional /behavioral responses, and learning preferences (eg, learning barriers, education  needs)  [] For the neuromuscular system: a general assessment of gross coordinated movement (eg, balance, gait, locomotion, transfers, and transitions) and motor function  (motor control and motor learning)  [] For the musculoskeletal system: the assessment of gross symmetry, gross range of motion, gross strength, height, and weight  [] For the integumentary system: the assessment of pliability(texture), presence of scar formation, skin color, and skin integrity  [] For cardiovascular/pulmonary system: the assessment of heart rate, respiratory rate, blood pressure, and edema     Activity limitations:    [] Patient's cognitive status and saf ety concerns          [] Status of current condition      [] Weight bearing restriction  [] Cardiopulmunary Restriction    Participation Restrictions:   [] Goals and goal agreement with the patient     [] Rehab potential (prognosis) and probable outcome      Examination of Body System: (criteria)    [] 38145 - addressing 1-2 elements    [] 70151 - addressing a total of 3 or more elements     [] 64165 -  Addressing a total of 4 or more elements         Clinical Presentation: (criteria)  Choose one     On examination of body system using standardized tests and measures patient presents with (CHOOSE ONE) elements from any of the following: body structures and functions, activity limitations, and/or participation restrictions.  Leading to a clinical presentation that is considered (CHOOSE ONE)                              Clinical Decision Making  (Eval Complexity):  Choose One     Time Tracking:     PT Received On: 11/30/18  PT Start Time: 0923     PT Stop Time: 0949  PT Total Time (min): 26 min     Billable Minutes: Evaluation 10 and Gait Training 16      Princess Ricardo, PT  11/30/2018

## 2018-11-30 NOTE — PLAN OF CARE
Social work consult received for: Pt with recurrent syncope and she lives alone, need to consider other options, at minimum would recommend Life Alert service. per Dr. Herbert    Patient wants to d/c home alone; had [facility] in the past.  Provided information on life alert.       11/30/18 6287   Discharge Reassessment   Assessment Type Discharge Planning Reassessment

## 2018-11-30 NOTE — PLAN OF CARE
PCP is Dr Edmonds.  Verified insurance as [employer].  Pharmacy is [facility] on [address].  Patient lives alone.  Independent with ADL's; family drives as needed.  Patient has 6 children; 3 of her daughters live nearby and assist patient as needed.  Denies HH, however had [facility] in the past.  Discharge plan is home; possibly with HH.       11/30/18 1154   Discharge Assessment   Assessment Type Discharge Planning Assessment   Confirmed/corrected address and phone number on facesheet? Yes   Assessment information obtained from? Patient   Prior to hospitilization cognitive status: Alert/Oriented   Prior to hospitalization functional status: Independent;Assistive Equipment   Current cognitive status: Alert/Oriented   Current Functional Status: Independent;Assistive Equipment   Lives With alone   Able to Return to Prior Arrangements yes   Is patient able to care for self after discharge? Yes   Patient's perception of discharge disposition home or selfcare   Readmission Within The Last 30 Days no previous admission in last 30 days   Patient currently receives any other outside agency services? No   Equipment Currently Used at Home walker, rolling;glucometer;nebulizer;oxygen   Do you have any problems affording any of your prescribed medications? No   Is the patient taking medications as prescribed? yes   Does the patient have transportation home? Yes   Transportation Available family or friend will provide   Dialysis Name and Scheduled days none   Does the patient receive services at the [facility]? No   Discharge Plan A Home   Discharge Plan B Home Health   Patient/Family In Agreement With Plan yes

## 2018-11-30 NOTE — PROGRESS NOTES
[facility]  Cardiology  Progress Note    Patient Name: Blaire Cage  MRN: [id number]  Admission Date: 11/29/2018  Hospital Length of Stay: 0 days  Code Status: Full Code   Attending Physician: Gaby Zuñiga MD   Primary Care Physician: Eli Edmonds MD  Expected Discharge Date:   Principal Problem:Syncopal episodes    Subjective:     Hospital Course: As per HPI and consult    Interval History: Pt sitting up in bed and states feeling better, also has been up walking in the halls with no further syncope. No new concerns voiced.    ROS   No new concerns otherwise negative.    Objective:     Vital Signs (Most Recent):  Temp: 98.1 °F (36.7 °C) (11/30/18 1109)  Pulse: 61 (11/30/18 1109)  Resp: 16 (11/30/18 1109)  BP: (!) 144/63 (11/30/18 1109)  SpO2: 100 % (11/30/18 1109) Vital Signs (24h Range):  Temp:  [96.1 °F (35.6 °C)-98.4 °F (36.9 °C)] 98.1 °F (36.7 °C)  Pulse:  [56-70] 61  Resp:  [16-18] 16  SpO2:  [97 %-100 %] 100 %  BP: (108-165)/(55-79) 144/63     Weight: 63.7 kg (140 lb 6.9 oz)  Body mass index is 24.88 kg/m².    SpO2: 100 %  O2 Device (Oxygen Therapy): nasal cannula      Intake/Output Summary (Last 24 hours) at 11/30/2018 1122  Last data filed at 11/30/2018 0600  Gross per 24 hour   Intake 1260 ml   Output --   Net 1260 ml       Lines/Drains/Airways     Peripheral Intravenous Line                 Peripheral IV - Single Lumen 11/29/18 Left Antecubital 1 day                Physical Exam   No changes in exam from previous.    Significant Labs: All pertinent lab results from the last 24 hours have been reviewed.    Significant Imaging:   MRI Brain - There is no acute abnormality.  There is generalized volume loss and nonspecific white matter change.  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  There is no hemorrhage, mass effect or acute infarction.    Assessment and Plan:     Syncope with pre-renal azotemia on 3 times weekly Lasix with  polyuria  Monitor for further episodes  Maintain fall risk  May require PT for gait assessment due to her anticoagulation for PE hx.  MRI negative for anything acute.  Would hold diuretic and encourage adequate hydration  This is a recurrent problem and she lives alone, need to consider other options, at minimum would recommend Life Alert service.  Given the option of a loop recorder, pt not sure but given the option to think about it. Could also be done outpt.     Orthostasis  Monitor orthostatics BID, as well monitor BP's.  Keep on telemetry for any arrthymia  Could consider switching her Norvasc till bedtime      Hypertension associated with diabetes  BP currently stable but will need to monitor and adjust as needed.     Hyperlipidemia associated with type 2 diabetes mellitus  Continue statin therapy     Left ventricular diastolic dysfunction with preserved systolic function  Continue to monitor for any HF symptoms. Currently denying any SOB or edema.     Hypertensive left ventricular hypertrophy, without heart failure  As per above.     Long term use of anticoagulation for hx of PE  Continue current anticoagluation  Await MRI results.     Further plan of care as per Dr Herbert.    Active Diagnoses:    Diagnosis Date Noted POA    PRINCIPAL PROBLEM:  Syncopal episodes [R55] 08/24/2016 Yes    Recurrent syncope [R55] 11/30/2018 Yes    Orthostatic hypotension [I95.1] 11/30/2018 Yes    Diastolic CHF, chronic [I50.32] 09/14/2018 Yes    Anemia, chronic renal failure, stage 3 (moderate) [N18.3, D63.1] 06/29/2018 Yes    Chronic anticoagulation [Z79.01] 06/28/2018 Not Applicable    History of pulmonary embolism [Z86.711] 06/09/2018 Yes    Hypertensive left ventricular hypertrophy, without heart failure [I11.9] 06/15/2016 Yes    Hyperlipidemia associated with type 2 diabetes mellitus [E11.69, E78.5] 01/18/2013 Yes    Hypertension associated with diabetes [E11.59, I10] 01/18/2013 Yes    CKD (chronic kidney disease),  stage III, eGFR 39, progressive 31 [N18.3] 01/18/2013 Yes    Hypothyroidism [E03.9] 01/18/2013 Yes      Problems Resolved During this Admission:       VTE Risk Mitigation (From admission, onward)        Ordered     Place JIMMY hose  Until discontinued      11/30/18 0959     apixaban tablet 5 mg  2 times daily      11/29/18 1349          Catarina Ballesteros, JUAN C  Cardiology  [facility]

## 2018-11-30 NOTE — PT/OT/SLP EVAL
Occupational Therapy   Evaluation and Discharge Note    Name: Blaire Cage  MRN: [id number]  Admitting Diagnosis:  Syncopal episodes      Recommendations:     Discharge Recommendations: home  Discharge Equipment Recommendations:  walker, rolling  Barriers to discharge:       History:     Occupational Profile:  Living Environment: Patient lives alone in [facility] with step over tub/shower combo   Previous level of function: Independent to Modified Independent with use of tub bench and use of RW - pt reports she uses her walker when she is in the bathroom but otherwise she only occasionally uses   Roles and Routines: Mother and grandmother - pt has daughters and grandchildren who are very supportive, live close by  Equipment Owned:  glucometer, nebulizer, oxygen(pt reports she owns a 15-16 yr old walker but does not use - only uses occasionally)  Assistance upon Discharge: family - pt has good family support from daughters and grandchildren    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 8/7/2018    Hip arthritis 3/1/2016    Homocysteinemia 8/7/2018    Hyperlipidemia     Hypertension     Normocytic normochromic anemia 6/29/2018    PE (pulmonary thromboembolism) 06/09/2018    Pneumonia of right lower lobe due to infectious organism 9/11/2017    Thyroid disease     hypothyroid       Past Surgical History:   Procedure Laterality Date    APPENDECTOMY      CHOLECYSTECTOMY      ESOPHAGOGASTRODUODENOSCOPY (EGD) N/A 10/25/2017    Per[facility]  [facility]    FRACTURE SURGERY      right hip     HERNIA REPAIR      groin    HYSTERECTOMY      VARICOSE VEIN SURGERY         Subjective     Chief Complaint: To go home as soon as she can; fear of falling   Patient/Family stated goals: To go home and take care of myself again   Communicated with: nurse prior to session.  Pain/Comfort:  · Pain Rating 1: 0/10    Patients cultural, spiritual, Cheondoism conflicts given the current situation:      Objective:     Patient found with: oxygen, peripheral IV, telemetry    General Precautions: Standard, fall   Orthopedic Precautions:N/A   Braces: N/A     Occupational Performance:    Bed Mobility:    · Patient completed Supine to Sit with modified independence  · Patient completed Sit to Supine with modified independence    Functional Mobility/Transfers:  · Patient completed Sit <> Stand Transfer with stand by assistance  with  no assistive device   · Functional Mobility: Pt ambulating from EOB <> sink for grooming with CGA - no LOB    Activities of Daily Living:  · Feeding:  (I) after Set-up of lunch tray   · Grooming: I) after set-up    · Lower Body Dressing: Supervision to don/doff L sock - pt refusing to don/doff R sock due to discomfort of R toe  · Toileting: Supervision      Cognitive/Visual Perceptual:  Cognitive/Psychosocial Skills:     -       Oriented to: Person, Place, Time and Situation   -       Follows Commands/attention:Follows multistep  commands    Physical Exam:  Balance:    -       Dynamic Sit - Normal; Static Stand - Normal    Patient left HOB elevated with all lines intact, call button in reach, bed alarm on and nurse notified    AMPAC 6 Click:  AMPA Total Score: 24    Treatment & Education:  -OTR providing education regarding OT role/POC, Therapy Services as well as safety awareness, use of bed/wheelchair alarms, calling for assistance.    - OTR providing education regarding correct transfer sequence and techniques with  "proper hand placement, use of DME/AE, and adaptive techniques to increase [facility] with self-care tasks  - OTR providing education/instruction regarding safety awareness and fall prevention in home environment and emphasizing importance of using RW in home environment and during community mobility, not only for increasing safety and fall prevention but also for added confidence since patient has such a fear of falling - pt verbalizes understanding and in agreement     Education:    Assessment:     Blaire Cage is a 88 y.o. female with a medical diagnosis of Syncopal episodes. At this time, patient is functioning at their prior level of function and does not require further acute OT services. Patient denies need for therapy services at this time.     Clinical Decision Makin.  OT Low:  "Pt evaluation falls under low complexity for evaluation coding due to performance deficits noted in 1-3 areas as stated above and 0 co-morbities affecting current functional status. Data obtained from problem focused assessments. No modifications or assistance was required for completion of evaluation. Only brief occupational profile and history review completed."     Plan:     During this hospitalization, patient does not require further acute OT services.  Please re-consult if situation changes.    · Plan of Care Reviewed with: patient    This Plan of care has been discussed with the patient who was involved in its development and understands and is in agreement with the identified goals and treatment plan    GOALS:   Multidisciplinary Problems     Occupational Therapy Goals     Not on file                Time Tracking:     OT Date of Treatment: 18  OT Start Time: 1244  OT Stop Time: 1258  OT Total Time (min): 14 min    Billable Minutes:Evaluation 14    IMER Wilson LOTR  2018    "

## 2018-11-30 NOTE — PLAN OF CARE
11/29/18 2030   PRE-TX-O2-ETCO2   O2 Device (Oxygen Therapy) nasal cannula   $ Is the patient on Low Flow Oxygen? Yes   Flow (L/min) 2   Oxygen Concentration (%) 28   SpO2 100 %   Pulse Oximetry Type Intermittent   $ Pulse Oximetry - Multiple Charge Pulse Oximetry - Multiple   Ready to Wean/Extubation Screen   FIO2<=50 (chart decimal) 0.28

## 2018-12-01 VITALS
HEART RATE: 67 BPM | SYSTOLIC BLOOD PRESSURE: 181 MMHG | TEMPERATURE: 97 F | DIASTOLIC BLOOD PRESSURE: 77 MMHG | WEIGHT: 140.44 LBS | HEIGHT: 63 IN | OXYGEN SATURATION: 96 % | RESPIRATION RATE: 20 BRPM | BODY MASS INDEX: 24.88 KG/M2

## 2018-12-01 PROBLEM — E86.0 DEHYDRATION: Status: RESOLVED | Noted: 2018-11-30 | Resolved: 2018-12-01

## 2018-12-01 PROBLEM — I95.1 ORTHOSTATIC HYPOTENSION: Status: RESOLVED | Noted: 2018-11-30 | Resolved: 2018-12-01

## 2018-12-01 PROBLEM — N17.9 AKI (ACUTE KIDNEY INJURY): Status: RESOLVED | Noted: 2018-11-30 | Resolved: 2018-12-01

## 2018-12-01 LAB
ANION GAP SERPL CALC-SCNC: 7 MMOL/L
ANION GAP SERPL CALC-SCNC: 7 MMOL/L
BASOPHILS # BLD AUTO: 0 K/UL
BASOPHILS # BLD AUTO: 0 K/UL
BASOPHILS NFR BLD: 0.5 %
BASOPHILS NFR BLD: 0.6 %
BUN SERPL-MCNC: 46 MG/DL
BUN SERPL-MCNC: 46 MG/DL
CALCIUM SERPL-MCNC: 8.3 MG/DL
CALCIUM SERPL-MCNC: 8.3 MG/DL
CHLORIDE SERPL-SCNC: 108 MMOL/L
CHLORIDE SERPL-SCNC: 108 MMOL/L
CO2 SERPL-SCNC: 28 MMOL/L
CO2 SERPL-SCNC: 28 MMOL/L
CREAT SERPL-MCNC: 1.3 MG/DL
CREAT SERPL-MCNC: 1.3 MG/DL
DIFFERENTIAL METHOD: ABNORMAL
DIFFERENTIAL METHOD: ABNORMAL
EOSINOPHIL # BLD AUTO: 0.3 K/UL
EOSINOPHIL # BLD AUTO: 0.3 K/UL
EOSINOPHIL NFR BLD: 4.5 %
EOSINOPHIL NFR BLD: 4.5 %
ERYTHROCYTE [DISTWIDTH] IN BLOOD BY AUTOMATED COUNT: 15.2 %
ERYTHROCYTE [DISTWIDTH] IN BLOOD BY AUTOMATED COUNT: 15.5 %
EST. GFR  (AFRICAN AMERICAN): 42 ML/MIN/1.73 M^2
EST. GFR  (AFRICAN AMERICAN): 42 ML/MIN/1.73 M^2
EST. GFR  (NON AFRICAN AMERICAN): 37 ML/MIN/1.73 M^2
EST. GFR  (NON AFRICAN AMERICAN): 37 ML/MIN/1.73 M^2
GLUCOSE SERPL-MCNC: 97 MG/DL
GLUCOSE SERPL-MCNC: 97 MG/DL
HCT VFR BLD AUTO: 27 %
HCT VFR BLD AUTO: 27.2 %
HGB BLD-MCNC: 9.1 G/DL
HGB BLD-MCNC: 9.2 G/DL
LYMPHOCYTES # BLD AUTO: 1.6 K/UL
LYMPHOCYTES # BLD AUTO: 1.8 K/UL
LYMPHOCYTES NFR BLD: 28.3 %
LYMPHOCYTES NFR BLD: 28.5 %
MCH RBC QN AUTO: 32.4 PG
MCH RBC QN AUTO: 32.6 PG
MCHC RBC AUTO-ENTMCNC: 33.6 G/DL
MCHC RBC AUTO-ENTMCNC: 33.8 G/DL
MCV RBC AUTO: 96 FL
MCV RBC AUTO: 96 FL
MONOCYTES # BLD AUTO: 0.6 K/UL
MONOCYTES # BLD AUTO: 0.6 K/UL
MONOCYTES NFR BLD: 10.3 %
MONOCYTES NFR BLD: 10.4 %
NEUTROPHILS # BLD AUTO: 3.3 K/UL
NEUTROPHILS # BLD AUTO: 3.5 K/UL
NEUTROPHILS NFR BLD: 56 %
NEUTROPHILS NFR BLD: 56.4 %
PLATELET # BLD AUTO: 156 K/UL
PLATELET # BLD AUTO: 161 K/UL
PMV BLD AUTO: 9.2 FL
PMV BLD AUTO: 9.3 FL
POTASSIUM SERPL-SCNC: 4.1 MMOL/L
POTASSIUM SERPL-SCNC: 4.1 MMOL/L
RBC # BLD AUTO: 2.8 M/UL
RBC # BLD AUTO: 2.82 M/UL
SODIUM SERPL-SCNC: 143 MMOL/L
SODIUM SERPL-SCNC: 143 MMOL/L
WBC # BLD AUTO: 5.8 K/UL
WBC # BLD AUTO: 6.2 K/UL

## 2018-12-01 PROCEDURE — 80048 BASIC METABOLIC PNL TOTAL CA: CPT

## 2018-12-01 PROCEDURE — 85025 COMPLETE CBC W/AUTO DIFF WBC: CPT

## 2018-12-01 PROCEDURE — 25000003 PHARM REV CODE 250: Performed by: NURSE PRACTITIONER

## 2018-12-01 PROCEDURE — 94761 N-INVAS EAR/PLS OXIMETRY MLT: CPT

## 2018-12-01 PROCEDURE — 99900035 HC TECH TIME PER 15 MIN (STAT)

## 2018-12-01 PROCEDURE — G0378 HOSPITAL OBSERVATION PER HR: HCPCS

## 2018-12-01 PROCEDURE — 97116 GAIT TRAINING THERAPY: CPT

## 2018-12-01 PROCEDURE — 36415 COLL VENOUS BLD VENIPUNCTURE: CPT

## 2018-12-01 PROCEDURE — 27000221 HC OXYGEN, UP TO 24 HOURS

## 2018-12-01 RX ORDER — IPRATROPIUM BROMIDE AND ALBUTEROL SULFATE 2.5; .5 MG/3ML; MG/3ML
3 SOLUTION RESPIRATORY (INHALATION) ONCE
Status: DISCONTINUED | OUTPATIENT
Start: 2018-12-01 | End: 2018-12-01 | Stop reason: HOSPADM

## 2018-12-01 RX ORDER — FUROSEMIDE 40 MG/1
20 TABLET ORAL
Start: 2018-12-03 | End: 2019-02-13 | Stop reason: SDUPTHER

## 2018-12-01 RX ORDER — AMLODIPINE BESYLATE 5 MG/1
2.5 TABLET ORAL NIGHTLY
Start: 2018-12-01 | End: 2018-12-31

## 2018-12-01 RX ADMIN — LEVOTHYROXINE SODIUM 88 MCG: 88 TABLET ORAL at 06:12

## 2018-12-01 RX ADMIN — APIXABAN 5 MG: 2.5 TABLET, FILM COATED ORAL at 08:12

## 2018-12-01 RX ADMIN — ASPIRIN 81 MG: 81 TABLET, COATED ORAL at 08:12

## 2018-12-01 RX ADMIN — SERTRALINE 25 MG: 25 TABLET, FILM COATED ORAL at 08:12

## 2018-12-01 RX ADMIN — Medication 1 CAPSULE: at 08:12

## 2018-12-01 NOTE — PLAN OF CARE
Problem: Patient Care Overview  Goal: Plan of Care Review  Outcome: Ongoing (interventions implemented as appropriate)   12/01/18 0629   Coping/Psychosocial   Plan Of Care Reviewed With patient     Goal: Individualization & Mutuality  Outcome: Ongoing (interventions implemented as appropriate)   12/01/18 0629   Individualization   Patient Specific Preferences To go home   Patient Specific Goals No syncopal episodes, no signs of dehydration or bleeding, improved kidney function   Patient Specific Interventions Meds given as per MAR, Maintain IVFs, assist with ADLs, monitor for signs of bleeding, monitor tele, labs and VS.

## 2018-12-01 NOTE — PLAN OF CARE
Problem: Patient Care Overview  Goal: Plan of Care Review  Outcome: Ongoing (interventions implemented as appropriate)  02 saturation 99% on nc at 2lpm.

## 2018-12-01 NOTE — DISCHARGE SUMMARY
"[facility]  Hospital Medicine  Discharge Summary      Patient Name: Blaire Cage  MRN: [id number]  Admission Date: 11/29/2018  Hospital Length of Stay: 0 days  Discharge Date and Time:  12/01/2018 2:56 PM  Attending Physician: Tony Bender MD   Discharging Provider: JUAN C Guevara  Primary Care Provider: Eli Edmonds MD    HPI:   This is a 88 y.o. female with DM type 2, Hypertension, diastolic HF, COPD, prior PE on Apixaban, and recurrent syncope who presents to the ED for evaluation of syncope that occurred this morning.  She states she was sitting at the table and "just didn't feel good with lightheadedness" and passed out associated with nausea and vomiting x 1.  According to her daughter, the patient was unconscious for approximately 5 minutes, came to and then became unconscious a second time while on the phone with EMS.  Patient states she does not recall passing out. The patient/daughter deny head trauma, difficulty urinating or any other symptoms at this time. Patient reports "I have been having fainting my whole life, especially when I listening or talking about sad things."  Patient has history of recurrent syncopal events in the past and is followed by cardiology. Patient was evaluated in the ER where her orthostatic vitals in ED were positive. Patient was placed in the hospital for further evaluation and treatment.     * No surgery found *      Hospital Course:   Patient monitored closely during observation stay. She was found to have significant orthostatic hypotension. Home BP medications  and lasix were placed on hold.  She received IVF for hydration. Cardiology was consulted. MRI of the brain without contrast showed there was no acute abnormality. There was generalized volume loss and nonspecific white matter change.  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 There was no hemorrhage, mass effect or acute " infarctionobtained and no acute process demonstrated. PT and OT were consulted for debility. There were concerns patient lived at home alone on OAC with history of recurrent syncope noted. These concerns were discussed with patient along with possible option for placement. Patient reported she wanted to return living back to her home and that her children looked in on her often. She did agree to Home Health. It was discussed with patient that she should get a Life Alert System or something equivalent to that for home use and she said she would discuss that with her children. Patient was noted to have improvement in her condition. Her SHALINI resolved. Her orthostatics later were no longer positive. Patient had no signs of syncope or presyncope and she was discharged to home once cleared by Cardiology. Her home Norvasc and lasix doses were reduced. Home Health was ordered for RN, PT, OT evaluate and treat.      Consults:   Consults (From admission, onward)        Status Ordering Provider     Inpatient consult to Cardiology  Once     Provider:  Alfredito Trevino MD    Completed HIRAM MIRZA     Inpatient consult to Social Work/Case Management         Completed SHAHRIAR SELBY        Service: Hospital Medicine    Final Active Diagnoses:    Diagnosis Date Noted POA    Recurrent syncope [R55] 11/30/2018 Yes    Diastolic CHF, chronic [I50.32] 09/14/2018 Yes    Anemia, chronic renal failure, stage 3 (moderate) [N18.3, D63.1] 06/29/2018 Yes    Chronic anticoagulation [Z79.01] 06/28/2018 Not Applicable    History of pulmonary embolism [Z86.711] 06/09/2018 Yes    Hypertensive left ventricular hypertrophy, without heart failure [I11.9] 06/15/2016 Yes    Hyperlipidemia associated with type 2 diabetes mellitus [E11.69, E78.5] 01/18/2013 Yes    Hypertension associated with diabetes [E11.59, I10] 01/18/2013 Yes    CKD (chronic kidney disease), stage III, eGFR 39, progressive 31 [N18.3] 01/18/2013 Yes    Hypothyroidism  [E03.9] 01/18/2013 Yes      Problems Resolved During this Admission:    Diagnosis Date Noted Date Resolved POA    PRINCIPAL PROBLEM:  Syncopal episodes [R55] 08/24/2016 12/01/2018 Yes    Orthostatic hypotension [I95.1] 11/30/2018 12/01/2018 Yes    SHALINI (acute kidney injury) [N17.9] 11/30/2018 12/01/2018 Yes    Dehydration [E86.0] 11/30/2018 12/01/2018 Yes       Discharged Condition: stable    Disposition: Home or Self Care- [facility]     Follow Up:  Follow-up Information     Ashok Herbert MD In 1 week.    Specialty:  Cardiology  Why:  Follow up for Loop recorder placement  Contact information:  [address]  [address]  [place] [address] [phone]             Eli Edmonds MD In 2 weeks.    Specialty:  Family Medicine  Contact information:  [address]  [place] [address]  [phone]             [facility].    Specialty:  Home Health Services  Contact information:  [address] [address] [place] [address] B  [facility] [address] [phone]                 Patient Instructions:      Referral to Home health   Referral Priority: Routine Referral Type: Home Health   Referral Reason: Specialty Services Required   Requested Specialty: Home Health Services   Number of Visits Requested: 1     Diet diabetic   Order Comments: 1800 ADA diet     Notify your health care provider if you experience any of the following:  difficulty breathing or increased cough     Notify your health care provider if you experience any of the following:  persistent dizziness, light-headedness, or visual disturbances     Notify your health care provider if you experience any of the following:  increased confusion or weakness     Notify your health care provider if you experience any of the following:   Order Comments: Any decline in condition     Other restrictions (specify):   Order Comments: O2 at 2 liters per Nc     Activity as tolerated   Order Comments: Fall precautions  No driving  Instruct patient to wear JIMMY hose and  Abdominal binder when OOB to help prevent orthostatic hypotension     Significant Diagnostic Studies:     MRI Brain Without Contrast- There is no acute abnormality.  There is generalized volume loss and nonspecific white matter change.  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 There is no hemorrhage, mass effect or acute infarction.    Labs:     Recent Labs   Lab 11/30/18 0453 12/01/18 0455    143  143   K 4.3 4.1  4.1    108  108   CO2 30* 28  28   * 97  97   BUN 63* 46*  46*   CREATININE 2.0* 1.3  1.3   CALCIUM 8.5* 8.3*  8.3*   ANIONGAP 9 7*  7*   ESTGFRAFRICA 25* 42*  42*   EGFRNONAA 22* 37*  37*     Recent Labs   Lab 11/30/18 0453 12/01/18 0455 12/01/18 0456   WBC 6.40 5.80 6.20   HGB 9.3* 9.2* 9.1*   HCT 27.7* 27.2* 27.0*    161 156     Lab Results   Component Value Date    INR 1.0 09/11/2017    INR 0.9 08/24/2016    INR 1.0 06/05/2016     Lab Results   Component Value Date    CHOL 149 05/23/2017    HDL 52 05/23/2017    LDLCALC 78.2 05/23/2017    TRIG 94 05/23/2017    CHOLHDL 34.9 05/23/2017     Recent Labs   Lab 11/29/18  1116   TROPONINI <0.006     Recent Labs   Lab 06/08/18 2027 06/27/18  1139 10/03/18  0449   HGBA1C 6.3* 6.8* 6.5*       Pending Diagnostic Studies:     None         Medications:  Reconciled Home Medications:      Medication List      CHANGE how you take these medications    amLODIPine 5 MG tablet  Commonly known as:  NORVASC  Take 0.5 tablets (2.5 mg total) by mouth nightly.  What changed:    · how much to take  · when to take this     furosemide 40 MG tablet  Commonly known as:  LASIX  Take 0.5 tablets (20 mg total) by mouth every Monday and Thursday.  Start taking on:  12/3/2018  What changed:  when to take this     nitroGLYCERIN 0.4 MG SL tablet  Commonly known as:  NITROSTAT  Place 1 tablet (0.4 mg total) under the tongue every 5 (five) minutes as needed for Chest pain. As needed  What changed:   additional instructions        CONTINUE taking these medications    acetaminophen 325 MG tablet  Commonly known as:  TYLENOL  Take 2 tablets (650 mg total) by mouth every 4 (four) hours as needed.     albuterol-ipratropium 2.5 mg-0.5 mg/3 mL nebulizer solution  Commonly known as:  DUO-NEB  USE ONE VIAL IN NEBULIZER EVERY 6 HOURS WHILE AWAKE     apixaban 5 mg Tab  Commonly known as:  ELIQUIS  Take 1 tablet (5 mg total) by mouth 2 (two) times daily.     aspirin 81 MG EC tablet  Commonly known as:  ECOTRIN  Take 81 mg by mouth once daily.     calcium carbonate 500 mg calcium (1,250 mg) tablet  Commonly known as:  OS-TASIA  Take 1 tablet by mouth 2 (two) times daily.     ferrous sulfate 325 mg (65 mg iron) Tab tablet  Commonly known as:  FEOSOL  Take 1 tablet (325 mg total) by mouth 2 (two) times daily with meals.     fish oil-omega-3 fatty acids 300-1,000 mg capsule  Take 2 g by mouth every evening.     fluticasone 50 mcg/actuation nasal spray  Commonly known as:  FLONASE  2 sprays by Each Nare route once daily.     folic acid-vit B6-vit B12 2.5-25-2 mg 2.5-25-2 mg Tab  Commonly known as:  FOLBIC or Equiv  Take 1 tablet by mouth once daily.     gabapentin 300 MG capsule  Commonly known as:  NEURONTIN  Take 1 capsule (300 mg total) by mouth every evening.     levothyroxine 88 MCG tablet  Commonly known as:  SYNTHROID  Take 1 tablet (88 mcg total) by mouth before breakfast.     magnesium oxide 400 mg (241.3 mg magnesium) tablet  Commonly known as:  MAG-OX  TAKE ONE TABLET BY MOUTH ONCE DAILY     montelukast 10 mg tablet  Commonly known as:  SINGULAIR  Take 1 tablet (10 mg total) by mouth every evening.     multivitamin tablet  Commonly known as:  THERAGRAN  Take 1 tablet by mouth once daily.     polyethylene glycol 17 gram Pwpk  Commonly known as:  GLYCOLAX  Take 17 g by mouth 2 (two) times daily.     sertraline 25 MG tablet  Commonly known as:  ZOLOFT  Take 1 tablet (25 mg total) by mouth once daily.     simvastatin 40 MG  tablet  Commonly known as:  ZOCOR  TAKE ONE TABLET BY MOUTH ONCE DAILY IN THE EVENING     SITagliptin 25 MG Tab  Commonly known as:  JANUVIA  Take 1 tablet (25 mg total) by mouth once daily.     VITAMIN C 500 MG tablet  Generic drug:  ascorbic acid (vitamin C)  Take 500 mg by mouth once daily.     vitamin D 1000 units Tab  Commonly known as:  VITAMIN D3  Take 1 tablet (1,000 Units total) by mouth once daily.        STOP taking these medications    cephALEXin 250 MG capsule  Commonly known as:  KEFLEX            Indwelling Lines/Drains at time of discharge:   Lines/Drains/Airways          None          Time spent on the discharge of patient: 57 minutes  Patient was seen and examined on the date of discharge and determined to be suitable for discharge.      JUAN C Guevara  Department of Hospital Medicine  [facility]

## 2018-12-01 NOTE — PLAN OF CARE
12/01/18 1444   Final Note   Assessment Type Final Discharge Note   Anticipated Discharge Disposition Home-Health  ([facility] )

## 2018-12-01 NOTE — PLAN OF CARE
SW met w/ pt per HH order.  Pt signed Disclosure, preference to return to [facility]  (d/c from their  service approx week ago).    USHA contacted [facility] [phone] re .   FAxed referral packet to [phone].       12/01/18 143   Discharge Reassessment   Assessment Type Discharge Planning Reassessment

## 2018-12-01 NOTE — PT/OT/SLP PROGRESS
"Physical Therapy Treatment    Patient Name:  Blaire Cage   MRN:  [id number]    Recommendations:     Discharge Recommendations:      Discharge Equipment Recommendations:     Barriers to discharge: None    Assessment:     Blaire Cage is a 88 y.o. female admitted with a medical diagnosis of Syncopal episodes.  She presents with the following impairments/functional limitations:  weakness, impaired endurance .    Rehab Prognosis:  good; patient would benefit from acute skilled PT services to address these deficits and reach maximum level of function.      Recent Surgery: * No surgery found *      Plan:     During this hospitalization, patient to be seen 6 x/week to address the above listed problems via gait training, therapeutic activities, therapeutic exercises  · Plan of Care Expires:  12/14/18   Plan of Care Reviewed with: patient    Subjective     Communicated with nurse Craft prior to session.  Patient found seated EOB upon PT entry to room, agreeable to treatment.      Chief Complaint: states, "Not sick enough to be here."  Patient comments/goals: to return home soon  Pain/Comfort:  · Pain Rating 1: 0/10    Patients cultural, spiritual, Anglican conflicts given the current situation:      Objective:     Patient found with: telemetry     General Precautions: Standard, fall   Orthopedic Precautions:N/A   Braces: N/A     Functional Mobility:  · Transfers:     · Sit to Stand:  contact guard assistance with no AD  · Gait: 200' with CGA. Declined use of rw.      AM-PAC 6 CLICK MOBILITY          Therapeutic Activities and Exercises:       Patient left seated EOB with all lines intact, call button in reach and nurse Craft notified..    GOALS:   Multidisciplinary Problems     Physical Therapy Goals        Problem: Physical Therapy Goal    Goal Priority Disciplines Outcome Goal Variances Interventions   Physical Therapy Goal     PT, PT/OT Ongoing (interventions implemented as appropriate)     Description:  Goals " to be met by: 2018     Patient will increase functional independence with mobility by performin. Supine to sit with [place]  2. Sit to stand transfer with Stand-by Assistance  3. Gait  x 250 feet with Contact Guard Assistance using Rolling Walker.   4. Lower extremity exercise program x20 reps per handout, with assistance as needed                      Time Tracking:     PT Received On: 18  PT Start Time: 1240     PT Stop Time: 1250  PT Total Time (min): 10 min     Billable Minutes: Gait Training 10min    Treatment Type: Treatment  PT/PTA: PTA     PTA Visit Number: 1     Jerri Roman PTA  2018

## 2018-12-01 NOTE — PLAN OF CARE
Problem: Physical Therapy Goal  Goal: Physical Therapy Goal  Goals to be met by: 2018     Patient will increase functional independence with mobility by performin. Supine to sit with [place]  2. Sit to stand transfer with Stand-by Assistance  3. Gait  x 250 feet with Contact Guard Assistance using Rolling Walker.   4. Lower extremity exercise program x20 reps per handout, with assistance as needed     Outcome: Ongoing (interventions implemented as appropriate)  Ambulated in hallway with CGA, no assistive device.

## 2018-12-01 NOTE — SUBJECTIVE & OBJECTIVE
Interval  History: Patient still with SHALINI but orthostatics now negative. Continue IVF and repeat BMP in am.     Review of Systems   Constitutional: Negative for activity change, appetite change, diaphoresis, fatigue and fever.   HENT: Negative for ear discharge, ear pain, facial swelling and postnasal drip.    Eyes: Negative for pain and redness.   Respiratory: Negative for cough, shortness of breath and wheezing.    Cardiovascular: Negative for chest pain, palpitations and leg swelling.   Gastrointestinal: Negative for abdominal distention, abdominal pain, blood in stool, diarrhea, nausea and vomiting.   Endocrine: Negative for polydipsia and polyphagia.   Genitourinary: Negative for difficulty urinating, dysuria, flank pain and hematuria.   Musculoskeletal: Negative for back pain, gait problem, neck pain and neck stiffness.   Skin: Negative for color change.   Allergic/Immunologic: Negative for food allergies.   Neurological: Negative for dizziness, seizures, syncope, facial asymmetry, speech difficulty, weakness and light-headedness.   Hematological: Bruises/bleeds easily.   Psychiatric/Behavioral: Negative for agitation, behavioral problems, confusion, hallucinations and suicidal ideas. The patient is not nervous/anxious.      Objective:     Vital Signs (Most Recent):  Temp: 97.8 °F (36.6 °C) (11/30/18 1500)  Pulse: 64 (11/30/18 1631)  Resp: 17 (11/30/18 1631)  BP: (!) 145/65 (11/30/18 1631)  SpO2: 98 % (11/30/18 1631) Vital Signs (24h Range):  Temp:  [97.3 °F (36.3 °C)-98.4 °F (36.9 °C)] 97.8 °F (36.6 °C)  Pulse:  [56-68] 64  Resp:  [16-18] 17  SpO2:  [97 %-100 %] 98 %  BP: (122-153)/(56-71) 145/65     Weight: 63.7 kg (140 lb 6.9 oz)  Body mass index is 24.88 kg/m².    Physical Exam   Constitutional: She is oriented to person, place, and time. She appears well-developed and well-nourished. No distress.   Frail elderly   HENT:   Head: Normocephalic and atraumatic.   Eyes: Conjunctivae and EOM are normal. Pupils  are equal, round, and reactive to light. Right eye exhibits no discharge. Left eye exhibits no discharge.   Neck: Normal range of motion. Neck supple. No JVD present.   Cardiovascular: Normal rate, regular rhythm and intact distal pulses.   Murmur (soft ASM) heard.  Pulmonary/Chest: Effort normal and breath sounds normal. No respiratory distress. She has no wheezes.   Abdominal: Soft. Bowel sounds are normal. She exhibits no distension. There is no tenderness. There is no guarding.   Genitourinary:   Genitourinary Comments: Not examined   Musculoskeletal: Normal range of motion. She exhibits no edema.   Neurological: She is alert and oriented to person, place, and time.   Skin: Skin is warm and dry. Capillary refill takes less than 2 seconds. She is not diaphoretic.   Psychiatric: She has a normal mood and affect. Her behavior is normal. Judgment and thought content normal.   Nursing note and vitals reviewed.        CRANIAL NERVES     CN III, IV, VI   Pupils are equal, round, and reactive to light.  Extraocular motions are normal.      Labs: Reviewed

## 2018-12-01 NOTE — PLAN OF CARE
11/30/18 2002   Patient Assessment/Suction   Level of Consciousness (AVPU) alert   PRE-TX-O2-ETCO2   O2 Device (Oxygen Therapy) nasal cannula   Flow (L/min) 2   Oxygen Concentration (%) 28   SpO2 100 %   Pulse Oximetry Type Intermittent   Ready to Wean/Extubation Screen   FIO2<=50 (chart decimal) 0.28

## 2018-12-03 ENCOUNTER — TELEPHONE (OUTPATIENT)
Dept: CARDIOLOGY | Facility: CLINIC | Age: 83
End: 2018-12-03

## 2018-12-03 ENCOUNTER — TELEPHONE (OUTPATIENT)
Dept: MEDSURG UNIT | Facility: HOSPITAL | Age: 83
End: 2018-12-03

## 2018-12-03 ENCOUNTER — PATIENT MESSAGE (OUTPATIENT)
Dept: ADMINISTRATIVE | Facility: OTHER | Age: 83
End: 2018-12-03

## 2018-12-03 NOTE — TELEPHONE ENCOUNTER
----- Message from Fernanda Mantilla sent at 12/3/2018 11:55 AM CST -----  Contact: [employer] message  ----- Message from JobzellaAldo nogueira Message sent at 12/3/2018 11:49 AM CST -----    Appointment Request From: Blaire Cage    With Provider: Ashok Herbert MD [[facility] - Cardiology]    Preferred Date Range: 12/5/2018 - 12/13/2018    Preferred Times: Any time    Reason for visit: Post hospitalization    Comments:  This message is being sent by Toya Hendrickson on behalf of Blaire Cage.  Post hospitalization visit

## 2018-12-05 ENCOUNTER — TELEPHONE (OUTPATIENT)
Dept: FAMILY MEDICINE | Facility: CLINIC | Age: 83
End: 2018-12-05

## 2018-12-05 NOTE — TELEPHONE ENCOUNTER
----- Message from Emely Sibley sent at 12/5/2018  2:20 PM CST -----  Contact: Ramonita with [facility]    Type: Needs Medical Advice    Who Called:  Ramonita with [facility]  Best Call Back Number: [phone]  Additional Information:  Patient has requested not to have her labs this week through home health, as she has an appointment on Thursday, December 6, at the [facility] [place], and she would like to have this week's labs done while she is there.  The labs are CBC, BMP, Pre-Albumin and Magnesium    Please call to advise  Thank you

## 2018-12-06 ENCOUNTER — OFFICE VISIT (OUTPATIENT)
Dept: CARDIOLOGY | Facility: CLINIC | Age: 83
End: 2018-12-06
Payer: MEDICARE

## 2018-12-06 ENCOUNTER — OFFICE VISIT (OUTPATIENT)
Dept: PODIATRY | Facility: CLINIC | Age: 83
End: 2018-12-06
Payer: MEDICARE

## 2018-12-06 VITALS
WEIGHT: 140.44 LBS | SYSTOLIC BLOOD PRESSURE: 161 MMHG | HEIGHT: 63 IN | HEART RATE: 62 BPM | DIASTOLIC BLOOD PRESSURE: 61 MMHG | BODY MASS INDEX: 24.88 KG/M2

## 2018-12-06 VITALS
HEIGHT: 63 IN | WEIGHT: 133.63 LBS | HEART RATE: 62 BPM | BODY MASS INDEX: 23.68 KG/M2 | SYSTOLIC BLOOD PRESSURE: 199 MMHG | DIASTOLIC BLOOD PRESSURE: 74 MMHG | OXYGEN SATURATION: 99 %

## 2018-12-06 DIAGNOSIS — I95.1 ORTHOSTATIC HYPOTENSION: ICD-10-CM

## 2018-12-06 DIAGNOSIS — E11.51 TYPE II DIABETES MELLITUS WITH PERIPHERAL CIRCULATORY DISORDER: ICD-10-CM

## 2018-12-06 DIAGNOSIS — Z87.898 HISTORY OF SYNCOPE IN CHILDHOOD: ICD-10-CM

## 2018-12-06 DIAGNOSIS — R55 RECURRENT SYNCOPE: Primary | ICD-10-CM

## 2018-12-06 DIAGNOSIS — N18.30 CKD (CHRONIC KIDNEY DISEASE), STAGE III: ICD-10-CM

## 2018-12-06 DIAGNOSIS — F17.200 TOBACCO DEPENDENCE: ICD-10-CM

## 2018-12-06 DIAGNOSIS — Z79.01 CHRONIC ANTICOAGULATION: ICD-10-CM

## 2018-12-06 DIAGNOSIS — E11.42 DIABETIC POLYNEUROPATHY ASSOCIATED WITH TYPE 2 DIABETES MELLITUS: Primary | ICD-10-CM

## 2018-12-06 DIAGNOSIS — B35.1 ONYCHOMYCOSIS DUE TO DERMATOPHYTE: ICD-10-CM

## 2018-12-06 PROCEDURE — 99214 OFFICE O/P EST MOD 30 MIN: CPT | Mod: S$GLB,,, | Performed by: INTERNAL MEDICINE

## 2018-12-06 PROCEDURE — 1101F PT FALLS ASSESS-DOCD LE1/YR: CPT | Mod: CPTII,S$GLB,, | Performed by: INTERNAL MEDICINE

## 2018-12-06 PROCEDURE — 99999 PR PBB SHADOW E&M-EST. PATIENT-LVL III: CPT | Mod: PBBFAC,,, | Performed by: PODIATRIST

## 2018-12-06 PROCEDURE — 99999 PR PBB SHADOW E&M-EST. PATIENT-LVL V: CPT | Mod: PBBFAC,,, | Performed by: INTERNAL MEDICINE

## 2018-12-06 PROCEDURE — 11721 DEBRIDE NAIL 6 OR MORE: CPT | Mod: Q9,S$GLB,, | Performed by: PODIATRIST

## 2018-12-06 PROCEDURE — 99499 UNLISTED E&M SERVICE: CPT | Mod: S$GLB,,, | Performed by: PODIATRIST

## 2018-12-06 NOTE — H&P (VIEW-ONLY)
"Subjective:    Patient ID:  Blaire Cage is a 88 y.o. female who presents for evaluation of Hospital Follow Up  For HTN, nausea, weakness, tachycardia and dehydration, post hospitalization at [facility] for recurrent syncope 12/1  PCP: Dr. Mauricio, now Dr. Edmonds see every 3-4 months  GI: Dr. Flores, considering a colonoscopy  Prior cardiologist: Dr. Fisher, last seen 10/2015  Lives alone, no pets, pet sit daughter's dog, 3 daughters in the area, Trudi, works at [employer], have her own POA need to get legal review. 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 female with PMH of COPD, hyperlipidemia, hypertension, hypothyroidism, GERD and arthritis presents to the ED for evaluation of lightheadedness "room spinning" with Nausea and vomiting. She states she was getting ready for [employer] and was standing at counter when she began to have "spinning, so I sat down in chair and started vomiting." She reports near syncope while talking with family on phone. Denies chest pain and shortness of breath. She is asymptomatic at this time. Patient noted to have bradycardia on telemetry.    Hospital Course (synopsis of major diagnoses, care, treatment, and services provided during the course of the hospital stay): Patient with bradycardia. Negative orthostatics. Patiens HR improved changing metoprolol to coreg. She was noted to have hypotension which improved with gentle hydration. Patient feeling better without complaints. Denies further N/V since admittance. Will decreased norvasc to 5 mg daily."    Old record reviewed, had Echo in 10/2015 - CONCLUSIONS     1 - Concentric hypertrophy. LV wall thickness is abnormal, with the septum and the posterior wall each measuring 1.3 " "cm across.    2 - Normal left ventricular systolic function (EF 60-65%).     3 - Left ventricular diastolic dysfunction. E/e'(lat) is 31    4 - Normal right ventricular systolic function .     5 - Pulmonary hypertension. The estimated PA systolic pressure is 47 mmHg.     6 - Trivial to mild aortic stenosis, JOHN = 1.72 cm2, peak velocity = 1.75 m/s, mean gradient = 7.0 mmHg.     7 - Mild mitral regurgitation.     8 - Mild tricuspid regurgitation.     Event monitor, 30 days - TEST DESCRIPTION   8 episodes of "heart racing" - in NSR with PAC, rate 59 to 113 bpm.  5 episodes of "lightheaded" - in NSR with PAC, rate 58 to 84 bpm.  3 episodes of "chest pain" - NSR with PAC, rate 67 to 88 bpm. Without ST abnormalities.  4 episodes of SOB - NSR with PAC, rate 67 to 103 bpm.  1 episode of isolated "skipped beat" - NSR with PAC, rate 76 bpm.  Max heart rate 130, minimum HR of 50, and average HR of 72 bpm.    CONCLUSIONS   Multiple symptoms reported, frequent PACs noted, rare PVC detected.   Chest pain noted without ST changes.    Since DC occasional WEST, no CP, no dizziness. Quit smoking for past 3 weeks. Lots of CV risk factors. Last lipid panel in 8/2015, LDL 94, non-. Last A1C 6.3% in 6/2016.    In 10/2016, here post hospital visit. DCS - "admitted for syncope and symptomatic bradycardia. She was on a BB at admission which was stopped. She had no further symptoms and was stable throughout her admission. Patient was seen by cardiology and felt not to be a candidate for PPM placement. She was set up with an event monitor at time of discharge and F/U with cardiology."  Chart reviewed had bradycardia with rate down to 37 on Coreg 3.125 mg bid.  Reviewed by Dr. Driscoll - "IMPRESSION:  1. Syncopal episode could be due to drug-induced i.e., Coreg, possible    vasovagal.  2. Hypertension.  3. History of COPD.     RECOMMENDATIONS:  1. From cardiac standpoint, we would recommend to check for orthostatic    changes.  2. Event " monitor recorder to rule out any arrhythmias. The patient needs to    follow up with Dr. Herbert.  3. To avoid heart rate slowing drugs, recommend to take off the Coreg and use    drugs, which do not slow the heart rate.    DSE 6/2016 - EKG Conclusions:    1. The EKG portion of this study is negative for ischemia at a peak heart rate of 125 bpm (95% of predicted).   2. Blood pressure remained stable throughout the protocol  (Presenting BP: 147/87 Peak BP: 205/64).   3. The following arrhythmias were present: PVCs.   4. There were no symptoms of chest discomfort or significant dyspnea throughout the protocol.     ECHO CONCLUSIONS     1 - Concentric remodeling. the septum measuring 1.2 cm and the posterior wall measuring 1.3 cm across.    2 - Hyperdynamic left ventricular systolic function (EF 65-70%).     3 - Left ventricular diastolic dysfunction. E/e'(lat) is 19    4 - Normal right ventricular systolic function .     5 - Pulmonary hypertension. The estimated PA systolic pressure is 47 mmHg.     6 - Small pericardial effusion.     7 - No siginificant change from Echo in 10/2015.     No evidence of stress induced myocardial ischemia.     At home have good and bad (tired) days. Stay active, do own housework. Walking around the yard without problems. ECG shows NSR, PAC rate 73. Orthostatic VS shows no significant drop in BP and HR 72-75 bpm. Event monitor in place. No further near-syncope.     In 11/2016, no new problem. Home BP log reviewed 102-188/50-80, HR 60-85, on amlodipine 10 mg daily, 42% of readings with SBP > 150, mostly in the afternoon where 43 out of 55 readings with SBP >150. Problem with Coreg as noted. The only time with symptoms is when the BP readings are elevated. Have significant CKD stage III, eGFR 39 with marked anemia, Hgb 10.6 with iron deficiency, iron sat only 12% and ferritin at 42. On iron supplement bid.     In 12/2016, had recurrent syncope in October and November 2016. Noted constipation with  "the iron supplement, at times no BM for 3 days. Labs reviewed eGFR 31, have pre-renal azotemia BUN/Cr 34/1.7, Hgb 10.6. Report drinking about 80 oz daily, IOM recommendation for women is 91 oz daily.  ED note 11/11/2016 - "86 y.o. female with a pmhx of Anticoagulant long-term use; COPD; Diabetes mellitus type II; ; Hyperlipidemia; Hypertension; and Thyroid disease presenting to the E.D. with generalized weakness. Pt states she had a large bowel movement after being constipated recently and subsequently became dizzy upon standing up from the commode. She has had similar episodes of dizziness upon positional changes. Denies fever, chest pain, blood in stool, HA, visual changes, numbness, specific weakness. Past Surgical History: hysterectomy, appendectomy.     86 y.o. female presenting with episode of syncope preceded by positional change with onset of symptoms. Workup was undertaken based on patient's age. She is asymptomatic since arrival. She was given 500 cc normal saline solution IV. Upon standing following fluids, she feels well with no significant orthostasis or difficulty walking. Very low suspicion for ACS and I do not think cardiac biomarker is indicated. EKG reviewed with no sign of acute ischemic process or arrhythmia. No arrhythmia evident here on monitor. Per medical record reviewed with 30 day event monitor results showing no sign of significant arrhythmia. I did speak with her cardiologist Dr. Herbert who agreed with discharge and outpatient follow-up if patient is doing well with ambulation and no significant orthostatic symptoms. Patient has baseline renal insufficiency with creatinine similar to prior. She has a stable anemia. Low suspicion for other emergent process. Very low suspicion for emergent intracranial process. I had discussed extensively with family and patient has significant family support. They are comfortable taking her home. Outpatient PCP or cardiology follow-up recommended next week. " "Detailed return precautions reviewed."    In 2/2017, here post ED visit for nausea, weakness, tachycardia, and dehydration because did not drink 100 oz of fluid, improved with IVF. Since ED remain active, no problem. Home /70. Worry about walking due to possible fall after being tripped by a dog, 17 years ago.     HPI comments: in 12/2018 here for post hospital review:  DCS - "88 y.o. female with DM type 2, Hypertension, diastolic HF, COPD, prior PE on Apixaban, and recurrent syncope who presents to the ED for evaluation of syncope that occurred this morning.  She states she was sitting at the table and "just didn't feel good with lightheadedness" and passed out associated with nausea and vomiting x 1.  According to her daughter, the patient was unconscious for approximately 5 minutes, came to and then became unconscious a second time while on the phone with EMS.  Patient states she does not recall passing out. The patient/daughter deny head trauma, difficulty urinating or any other symptoms at this time. Patient reports "I have been having fainting my whole life, especially when I listening or talking about sad things."  Patient has history of recurrent syncopal events in the past and is followed by cardiology. Patient was evaluated in the ER where her orthostatic vitals in ED were positive. Patient was placed in the hospital for further evaluation and treatment.      * No surgery found *       Hospital Course:   Patient monitored closely during observation stay. She was found to have significant orthostatic hypotension. Home BP medications  and lasix were placed on hold.  She received IVF for hydration. Cardiology was consulted. MRI of the brain without contrast showed there was no acute abnormality. There was generalized volume loss and nonspecific white matter change.  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 There was no hemorrhage, mass effect or acute " "infarctionobtained and no acute process demonstrated. PT and OT were consulted for debility. There were concerns patient lived at home alone on OAC with history of recurrent syncope noted. These concerns were discussed with patient along with possible option for placement. Patient reported she wanted to return living back to her home and that her children looked in on her often. She did agree to Home Health. It was discussed with patient that she should get a Life Alert System or something equivalent to that for home use and she said she would discuss that with her children. Patient was noted to have improvement in her condition. Her SHALINI resolved. Her orthostatics later were no longer positive. Patient had no signs of syncope or presyncope and she was discharged to home once cleared by Cardiology. Her home Norvasc and lasix doses were reduced. Home Health was ordered for RN, PT, OT evaluate and treat."    My note - reviewed history of lifelong recurrent syncope which makes vaso-vagal the most likely etiology, at her age she is at risk for arrhythmic issues also. ILR is a reasonable option. She have colonoscopy schedule this Monday, she will need to be monitored closely. ILR can be done as OP when she decide to proceed.     Review of Systems   Constitution: Positive for weakness and malaise/fatigue. Negative for diaphoresis, fever, night sweats and weight gain.   HENT: Positive for tinnitus. Negative for nosebleeds.    Eyes: Negative for visual disturbance.   Cardiovascular: Positive for claudication, dyspnea on exertion, irregular heartbeat, orthopnea, palpitations and paroxysmal nocturnal dyspnea. Negative for chest pain, cyanosis, leg swelling and near-syncope.   Respiratory: Positive for wheezing. Negative for cough, shortness of breath, sleep disturbances due to breathing and snoring.    Endocrine: Positive for polyuria. Negative for polydipsia.   Hematologic/Lymphatic: Does not bruise/bleed easily.   Skin: " "Negative for color change, flushing, nail changes, poor wound healing and suspicious lesions.   Musculoskeletal: Positive for arthritis, back pain, joint pain, neck pain and stiffness. Negative for falls, gout, joint swelling, muscle cramps, muscle weakness and myalgias.   Gastrointestinal: Positive for constipation. Negative for heartburn, hematemesis, hematochezia, melena and nausea.   Genitourinary: Positive for nocturia.   Neurological: Negative for disturbances in coordination, excessive daytime sleepiness, dizziness, focal weakness, headaches, light-headedness, loss of balance, numbness and vertigo.   Psychiatric/Behavioral: Positive for depression (lost son 3/21/2016, post heart surgery). Negative for substance abuse. The patient does not have insomnia and is not nervous/anxious.      Elbe score 2 today 5       Objective:    Physical Exam   Constitutional: She is oriented to person, place, and time. She appears well-developed and well-nourished.   HENT:   Head: Normocephalic.   Eyes: Conjunctivae and EOM are normal. Pupils are equal, round, and reactive to light.   Neck: Normal range of motion. Neck supple. No JVD present. No thyromegaly present.   Circumference 15.25"   Cardiovascular: Normal rate, regular rhythm and intact distal pulses. Exam reveals distant heart sounds. Exam reveals no gallop and no friction rub.   Murmur heard.   Medium-pitched machinery early systolic murmur is present with a grade of 2/6 at the upper right sternal border and upper left sternal border. Varicose veins  Pulses:       Posterior tibial pulses are 1+ on the right side, and 1+ on the left side.   Pulmonary/Chest: Effort normal and breath sounds normal. She has no rales. She exhibits no tenderness.   Diminished breath sounds   Abdominal: Soft. Bowel sounds are normal. There is no tenderness.   Waist 36", hip 40.5"   Musculoskeletal: Normal range of motion. She exhibits no edema.   Lymphadenopathy:     She has no cervical " adenopathy.   Neurological: She is alert and oriented to person, place, and time.   Skin: Skin is warm and dry. No rash noted.         Assessment:       1. Recurrent syncope    2. Tobacco dependence    3. Orthostatic hypotension    4. CKD (chronic kidney disease), stage III, eGFR 39, progressive 31    5. Chronic anticoagulation    6. History of syncope in childhood         Plan:       Blaire was seen today for follow-up.    Diagnoses and all orders for this visit:    Recurrent syncope  -     Notify EP Fellow to obtain informed consent; Standing  -     Height and weight; Standing  -     Void on call to Lab; Standing  -     Site prep; Standing  -     Call EP Lab (#[id number]) when patient is ready; Standing  -     Notify physician (specify); Standing  -     Diet NPO; Standing  -     Case Request-Cath Lab: Insertion, Implantable Loop Recorder; Standing  -     Place in Outpatient; Standing    Tobacco dependence    Orthostatic hypotension  -     Notify EP Fellow to obtain informed consent; Standing  -     Height and weight; Standing  -     Void on call to Lab; Standing  -     Site prep; Standing  -     Call EP Lab (#[id number]) when patient is ready; Standing  -     Notify physician (specify); Standing  -     Diet NPO; Standing  -     Case Request-Cath Lab: Insertion, Implantable Loop Recorder; Standing  -     Place in Outpatient; Standing    CKD (chronic kidney disease), stage III, eGFR 39, progressive 31    Chronic anticoagulation    History of syncope in childhood  -     Notify EP Fellow to obtain informed consent; Standing  -     Height and weight; Standing  -     Void on call to Lab; Standing  -     Site prep; Standing  -     Call EP Lab (#[id number]) when patient is ready; Standing  -     Notify physician (specify); Standing  -     Diet NPO; Standing  -     Case Request-Cath Lab: Insertion, Implantable Loop Recorder; Standing  -     Place in Outpatient; Standing        Abdominal obesity    - Instruction for Mediterranean diet and  heart healthy exercise given.  - Need to drink 100 oz of fluid daily  - CV status stable, continue current Rx, all medications reviewed, patient acknowledge good understanding.  - Check home blood pressure, 2 days weekly, do 2 readings within 5 minutes in AM and PM, keep log for review.  - Need give POA to a family members  - Consider use of walking sticks for ambulation.  - Recommend at least biannual cardiovascular evaluation in view of her significant risk factors.    Patient Active Problem List   Diagnosis    Diabetic neuropathy, type II diabetes mellitus    CKD (chronic kidney disease), stage III, eGFR 39, progressive 31    Hypothyroidism    Hypertension associated with diabetes    Hyperlipidemia associated with type 2 diabetes mellitus    Type 2 diabetes mellitus with stage 3 chronic kidney disease    Right carotid bruit    COPD (chronic obstructive pulmonary disease) with acute bronchitis    Anxiety    Tobacco dependence    Abdominal aortic aneurysm without rupture    Hip arthritis    Degenerative spinal arthritis    Osteoporosis    Left ventricular diastolic dysfunction with preserved systolic function    Hypertensive left ventricular hypertrophy, without heart failure    Smoker, quit 5/2016, 25 pck-years    Abdominal obesity    Absolute anemia    Body mass index (BMI) 23.0-23.9, adult, today 24.1    Iron deficiency anemia due to chronic blood loss    Proteinuria due to type 2 diabetes mellitus    History of syncope in childhood    Prerenal azotemia    Drug-induced constipation    COPD with acute exacerbation    Asthmatic bronchitis with acute exacerbation    Shortness of breath    Controlled type 2 diabetes mellitus, without long-term current use of insulin    Acute pulmonary embolism    SOB (shortness of breath)    Asthma-COPD overlap syndrome    Eosinophilic asthma    Moderate malnutrition    Debility    Type 2 diabetes mellitus with kidney complication, without  long-term current use of insulin    Chronic anticoagulation    Constipation    DVT (deep venous thrombosis)    History of pulmonary embolism    Anemia due to multiple mechanisms    Anemia, chronic disease    Normocytic normochromic anemia    Anemia, chronic renal failure, stage 3 (moderate)    Homocysteinemia    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    Hyperkalemia    Diastolic CHF, chronic    Anemia of chronic disease    Kidney stones    Macrocytic anemia    Recurrent syncope    Orthostatic hypotension     Total face-to-face time with the patient was 25 minutes and greater than 50% was spent in counseling and coordination of care. The above assessment and plan have been discussed at length. Labs and procedure over the last 6 months reviewed. Problem List updated. Asked to bring in all active medications / pills bottles with next visit.

## 2018-12-06 NOTE — PROGRESS NOTES
"Subjective:    Patient ID:  Blaire Cage is a 88 y.o. female who presents for evaluation of Hospital Follow Up  For HTN, nausea, weakness, tachycardia and dehydration, post hospitalization at [facility] for recurrent syncope 12/1  PCP: Dr. Mauricio, now Dr. Edmonds see every 3-4 months  GI: Dr. Flores, considering a colonoscopy  Prior cardiologist: Dr. Fisher, last seen 10/2015  Lives alone, no pets, pet sit daughter's dog, 3 daughters in the area, Trudi, works at [employer], have her own POA need to get legal review. 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 female with PMH of COPD, hyperlipidemia, hypertension, hypothyroidism, GERD and arthritis presents to the ED for evaluation of lightheadedness "room spinning" with Nausea and vomiting. She states she was getting ready for [employer] and was standing at counter when she began to have "spinning, so I sat down in chair and started vomiting." She reports near syncope while talking with family on phone. Denies chest pain and shortness of breath. She is asymptomatic at this time. Patient noted to have bradycardia on telemetry.    Hospital Course (synopsis of major diagnoses, care, treatment, and services provided during the course of the hospital stay): Patient with bradycardia. Negative orthostatics. Patiens HR improved changing metoprolol to coreg. She was noted to have hypotension which improved with gentle hydration. Patient feeling better without complaints. Denies further N/V since admittance. Will decreased norvasc to 5 mg daily."    Old record reviewed, had Echo in 10/2015 - CONCLUSIONS     1 - Concentric hypertrophy. LV wall thickness is abnormal, with the septum and the posterior wall each measuring 1.3 " "cm across.    2 - Normal left ventricular systolic function (EF 60-65%).     3 - Left ventricular diastolic dysfunction. E/e'(lat) is 31    4 - Normal right ventricular systolic function .     5 - Pulmonary hypertension. The estimated PA systolic pressure is 47 mmHg.     6 - Trivial to mild aortic stenosis, JOHN = 1.72 cm2, peak velocity = 1.75 m/s, mean gradient = 7.0 mmHg.     7 - Mild mitral regurgitation.     8 - Mild tricuspid regurgitation.     Event monitor, 30 days - TEST DESCRIPTION   8 episodes of "heart racing" - in NSR with PAC, rate 59 to 113 bpm.  5 episodes of "lightheaded" - in NSR with PAC, rate 58 to 84 bpm.  3 episodes of "chest pain" - NSR with PAC, rate 67 to 88 bpm. Without ST abnormalities.  4 episodes of SOB - NSR with PAC, rate 67 to 103 bpm.  1 episode of isolated "skipped beat" - NSR with PAC, rate 76 bpm.  Max heart rate 130, minimum HR of 50, and average HR of 72 bpm.    CONCLUSIONS   Multiple symptoms reported, frequent PACs noted, rare PVC detected.   Chest pain noted without ST changes.    Since DC occasional WEST, no CP, no dizziness. Quit smoking for past 3 weeks. Lots of CV risk factors. Last lipid panel in 8/2015, LDL 94, non-. Last A1C 6.3% in 6/2016.    In 10/2016, here post hospital visit. DCS - "admitted for syncope and symptomatic bradycardia. She was on a BB at admission which was stopped. She had no further symptoms and was stable throughout her admission. Patient was seen by cardiology and felt not to be a candidate for PPM placement. She was set up with an event monitor at time of discharge and F/U with cardiology."  Chart reviewed had bradycardia with rate down to 37 on Coreg 3.125 mg bid.  Reviewed by Dr. Driscoll - "IMPRESSION:  1. Syncopal episode could be due to drug-induced i.e., Coreg, possible    vasovagal.  2. Hypertension.  3. History of COPD.     RECOMMENDATIONS:  1. From cardiac standpoint, we would recommend to check for orthostatic    changes.  2. Event " monitor recorder to rule out any arrhythmias. The patient needs to    follow up with Dr. Herbert.  3. To avoid heart rate slowing drugs, recommend to take off the Coreg and use    drugs, which do not slow the heart rate.    DSE 6/2016 - EKG Conclusions:    1. The EKG portion of this study is negative for ischemia at a peak heart rate of 125 bpm (95% of predicted).   2. Blood pressure remained stable throughout the protocol  (Presenting BP: 147/87 Peak BP: 205/64).   3. The following arrhythmias were present: PVCs.   4. There were no symptoms of chest discomfort or significant dyspnea throughout the protocol.     ECHO CONCLUSIONS     1 - Concentric remodeling. the septum measuring 1.2 cm and the posterior wall measuring 1.3 cm across.    2 - Hyperdynamic left ventricular systolic function (EF 65-70%).     3 - Left ventricular diastolic dysfunction. E/e'(lat) is 19    4 - Normal right ventricular systolic function .     5 - Pulmonary hypertension. The estimated PA systolic pressure is 47 mmHg.     6 - Small pericardial effusion.     7 - No siginificant change from Echo in 10/2015.     No evidence of stress induced myocardial ischemia.     At home have good and bad (tired) days. Stay active, do own housework. Walking around the yard without problems. ECG shows NSR, PAC rate 73. Orthostatic VS shows no significant drop in BP and HR 72-75 bpm. Event monitor in place. No further near-syncope.     In 11/2016, no new problem. Home BP log reviewed 102-188/50-80, HR 60-85, on amlodipine 10 mg daily, 42% of readings with SBP > 150, mostly in the afternoon where 43 out of 55 readings with SBP >150. Problem with Coreg as noted. The only time with symptoms is when the BP readings are elevated. Have significant CKD stage III, eGFR 39 with marked anemia, Hgb 10.6 with iron deficiency, iron sat only 12% and ferritin at 42. On iron supplement bid.     In 12/2016, had recurrent syncope in October and November 2016. Noted constipation with  "the iron supplement, at times no BM for 3 days. Labs reviewed eGFR 31, have pre-renal azotemia BUN/Cr 34/1.7, Hgb 10.6. Report drinking about 80 oz daily, IOM recommendation for women is 91 oz daily.  ED note 11/11/2016 - "86 y.o. female with a pmhx of Anticoagulant long-term use; COPD; Diabetes mellitus type II; ; Hyperlipidemia; Hypertension; and Thyroid disease presenting to the E.D. with generalized weakness. Pt states she had a large bowel movement after being constipated recently and subsequently became dizzy upon standing up from the commode. She has had similar episodes of dizziness upon positional changes. Denies fever, chest pain, blood in stool, HA, visual changes, numbness, specific weakness. Past Surgical History: hysterectomy, appendectomy.     86 y.o. female presenting with episode of syncope preceded by positional change with onset of symptoms. Workup was undertaken based on patient's age. She is asymptomatic since arrival. She was given 500 cc normal saline solution IV. Upon standing following fluids, she feels well with no significant orthostasis or difficulty walking. Very low suspicion for ACS and I do not think cardiac biomarker is indicated. EKG reviewed with no sign of acute ischemic process or arrhythmia. No arrhythmia evident here on monitor. Per medical record reviewed with 30 day event monitor results showing no sign of significant arrhythmia. I did speak with her cardiologist Dr. Herbert who agreed with discharge and outpatient follow-up if patient is doing well with ambulation and no significant orthostatic symptoms. Patient has baseline renal insufficiency with creatinine similar to prior. She has a stable anemia. Low suspicion for other emergent process. Very low suspicion for emergent intracranial process. I had discussed extensively with family and patient has significant family support. They are comfortable taking her home. Outpatient PCP or cardiology follow-up recommended next week. " "Detailed return precautions reviewed."    In 2/2017, here post ED visit for nausea, weakness, tachycardia, and dehydration because did not drink 100 oz of fluid, improved with IVF. Since ED remain active, no problem. Home /70. Worry about walking due to possible fall after being tripped by a dog, 17 years ago.     HPI comments: in 12/2018 here for post hospital review:  DCS - "88 y.o. female with DM type 2, Hypertension, diastolic HF, COPD, prior PE on Apixaban, and recurrent syncope who presents to the ED for evaluation of syncope that occurred this morning.  She states she was sitting at the table and "just didn't feel good with lightheadedness" and passed out associated with nausea and vomiting x 1.  According to her daughter, the patient was unconscious for approximately 5 minutes, came to and then became unconscious a second time while on the phone with EMS.  Patient states she does not recall passing out. The patient/daughter deny head trauma, difficulty urinating or any other symptoms at this time. Patient reports "I have been having fainting my whole life, especially when I listening or talking about sad things."  Patient has history of recurrent syncopal events in the past and is followed by cardiology. Patient was evaluated in the ER where her orthostatic vitals in ED were positive. Patient was placed in the hospital for further evaluation and treatment.      * No surgery found *       Hospital Course:   Patient monitored closely during observation stay. She was found to have significant orthostatic hypotension. Home BP medications  and lasix were placed on hold.  She received IVF for hydration. Cardiology was consulted. MRI of the brain without contrast showed there was no acute abnormality. There was generalized volume loss and nonspecific white matter change.  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 There was no hemorrhage, mass effect or acute " "infarctionobtained and no acute process demonstrated. PT and OT were consulted for debility. There were concerns patient lived at home alone on OAC with history of recurrent syncope noted. These concerns were discussed with patient along with possible option for placement. Patient reported she wanted to return living back to her home and that her children looked in on her often. She did agree to Home Health. It was discussed with patient that she should get a Life Alert System or something equivalent to that for home use and she said she would discuss that with her children. Patient was noted to have improvement in her condition. Her SHALINI resolved. Her orthostatics later were no longer positive. Patient had no signs of syncope or presyncope and she was discharged to home once cleared by Cardiology. Her home Norvasc and lasix doses were reduced. Home Health was ordered for RN, PT, OT evaluate and treat."    My note - reviewed history of lifelong recurrent syncope which makes vaso-vagal the most likely etiology, at her age she is at risk for arrhythmic issues also. ILR is a reasonable option. She have colonoscopy schedule this Monday, she will need to be monitored closely. ILR can be done as OP when she decide to proceed.     Review of Systems   Constitution: Positive for weakness and malaise/fatigue. Negative for diaphoresis, fever, night sweats and weight gain.   HENT: Positive for tinnitus. Negative for nosebleeds.    Eyes: Negative for visual disturbance.   Cardiovascular: Positive for claudication, dyspnea on exertion, irregular heartbeat, orthopnea, palpitations and paroxysmal nocturnal dyspnea. Negative for chest pain, cyanosis, leg swelling and near-syncope.   Respiratory: Positive for wheezing. Negative for cough, shortness of breath, sleep disturbances due to breathing and snoring.    Endocrine: Positive for polyuria. Negative for polydipsia.   Hematologic/Lymphatic: Does not bruise/bleed easily.   Skin: " "Negative for color change, flushing, nail changes, poor wound healing and suspicious lesions.   Musculoskeletal: Positive for arthritis, back pain, joint pain, neck pain and stiffness. Negative for falls, gout, joint swelling, muscle cramps, muscle weakness and myalgias.   Gastrointestinal: Positive for constipation. Negative for heartburn, hematemesis, hematochezia, melena and nausea.   Genitourinary: Positive for nocturia.   Neurological: Negative for disturbances in coordination, excessive daytime sleepiness, dizziness, focal weakness, headaches, light-headedness, loss of balance, numbness and vertigo.   Psychiatric/Behavioral: Positive for depression (lost son 3/21/2016, post heart surgery). Negative for substance abuse. The patient does not have insomnia and is not nervous/anxious.      [place] score 2 today 5       Objective:    Physical Exam   Constitutional: She is oriented to person, place, and time. She appears well-developed and well-nourished.   HENT:   Head: Normocephalic.   Eyes: Conjunctivae and EOM are normal. Pupils are equal, round, and reactive to light.   Neck: Normal range of motion. Neck supple. No JVD present. No thyromegaly present.   Circumference 15.25"   Cardiovascular: Normal rate, regular rhythm and intact distal pulses. Exam reveals distant heart sounds. Exam reveals no gallop and no friction rub.   Murmur heard.   Medium-pitched machinery early systolic murmur is present with a grade of 2/6 at the upper right sternal border and upper left sternal border. Varicose veins  Pulses:       Posterior tibial pulses are 1+ on the right side, and 1+ on the left side.   Pulmonary/Chest: Effort normal and breath sounds normal. She has no rales. She exhibits no tenderness.   Diminished breath sounds   Abdominal: Soft. Bowel sounds are normal. There is no tenderness.   Waist 36", hip 40.5"   Musculoskeletal: Normal range of motion. She exhibits no edema.   Lymphadenopathy:     She has no cervical " adenopathy.   Neurological: She is alert and oriented to person, place, and time.   Skin: Skin is warm and dry. No rash noted.         Assessment:       1. Recurrent syncope    2. Tobacco dependence    3. Orthostatic hypotension    4. CKD (chronic kidney disease), stage III, eGFR 39, progressive 31    5. Chronic anticoagulation    6. History of syncope in childhood         Plan:       Blaire was seen today for follow-up.    Diagnoses and all orders for this visit:    Recurrent syncope  -     Notify EP Fellow to obtain informed consent; Standing  -     Height and weight; Standing  -     Void on call to Lab; Standing  -     Site prep; Standing  -     Call EP Lab (#[id number]) when patient is ready; Standing  -     Notify physician (specify); Standing  -     Diet NPO; Standing  -     Case Request-Cath Lab: Insertion, Implantable Loop Recorder; Standing  -     Place in Outpatient; Standing    Tobacco dependence    Orthostatic hypotension  -     Notify EP Fellow to obtain informed consent; Standing  -     Height and weight; Standing  -     Void on call to Lab; Standing  -     Site prep; Standing  -     Call EP Lab (#[id number]) when patient is ready; Standing  -     Notify physician (specify); Standing  -     Diet NPO; Standing  -     Case Request-Cath Lab: Insertion, Implantable Loop Recorder; Standing  -     Place in Outpatient; Standing    CKD (chronic kidney disease), stage III, eGFR 39, progressive 31    Chronic anticoagulation    History of syncope in childhood  -     Notify EP Fellow to obtain informed consent; Standing  -     Height and weight; Standing  -     Void on call to Lab; Standing  -     Site prep; Standing  -     Call EP Lab (#[id number]) when patient is ready; Standing  -     Notify physician (specify); Standing  -     Diet NPO; Standing  -     Case Request-Cath Lab: Insertion, Implantable Loop Recorder; Standing  -     Place in Outpatient; Standing        Abdominal obesity    - Instruction for Mediterranean diet and  heart healthy exercise given.  - Need to drink 100 oz of fluid daily  - CV status stable, continue current Rx, all medications reviewed, patient acknowledge good understanding.  - Check home blood pressure, 2 days weekly, do 2 readings within 5 minutes in AM and PM, keep log for review.  - Need give POA to a family members  - Consider use of walking sticks for ambulation.  - Recommend at least biannual cardiovascular evaluation in view of her significant risk factors.    Patient Active Problem List   Diagnosis    Diabetic neuropathy, type II diabetes mellitus    CKD (chronic kidney disease), stage III, eGFR 39, progressive 31    Hypothyroidism    Hypertension associated with diabetes    Hyperlipidemia associated with type 2 diabetes mellitus    Type 2 diabetes mellitus with stage 3 chronic kidney disease    Right carotid bruit    COPD (chronic obstructive pulmonary disease) with acute bronchitis    Anxiety    Tobacco dependence    Abdominal aortic aneurysm without rupture    Hip arthritis    Degenerative spinal arthritis    Osteoporosis    Left ventricular diastolic dysfunction with preserved systolic function    Hypertensive left ventricular hypertrophy, without heart failure    Smoker, quit 5/2016, 25 pck-years    Abdominal obesity    Absolute anemia    Body mass index (BMI) 23.0-23.9, adult, today 24.1    Iron deficiency anemia due to chronic blood loss    Proteinuria due to type 2 diabetes mellitus    History of syncope in childhood    Prerenal azotemia    Drug-induced constipation    COPD with acute exacerbation    Asthmatic bronchitis with acute exacerbation    Shortness of breath    Controlled type 2 diabetes mellitus, without long-term current use of insulin    Acute pulmonary embolism    SOB (shortness of breath)    Asthma-COPD overlap syndrome    Eosinophilic asthma    Moderate malnutrition    Debility    Type 2 diabetes mellitus with kidney complication, without  long-term current use of insulin    Chronic anticoagulation    Constipation    DVT (deep venous thrombosis)    History of pulmonary embolism    Anemia due to multiple mechanisms    Anemia, chronic disease    Normocytic normochromic anemia    Anemia, chronic renal failure, stage 3 (moderate)    Homocysteinemia    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    Hyperkalemia    Diastolic CHF, chronic    Anemia of chronic disease    Kidney stones    Macrocytic anemia    Recurrent syncope    Orthostatic hypotension     Total face-to-face time with the patient was 25 minutes and greater than 50% was spent in counseling and coordination of care. The above assessment and plan have been discussed at length. Labs and procedure over the last 6 months reviewed. Problem List updated. Asked to bring in all active medications / pills bottles with next visit.

## 2018-12-11 ENCOUNTER — TELEPHONE (OUTPATIENT)
Dept: CARDIOLOGY | Facility: CLINIC | Age: 83
End: 2018-12-11

## 2018-12-11 NOTE — TELEPHONE ENCOUNTER
----- Message from Mich García sent at 12/11/2018 12:41 PM CST -----  Type: Needs Medical Advice    Who Called:  Pt daughter uche   Best Call Back Number:[phone]  Additional Information: pt would like to know the specifics/information on upcoming procedure on 12/12/18. Please call deann to advise.

## 2018-12-11 NOTE — TELEPHONE ENCOUNTER
Called and spoke with MONO Singh in Cardiology at the hospital. She stated that she will call the patient to discuss.

## 2018-12-12 ENCOUNTER — HOSPITAL ENCOUNTER (OUTPATIENT)
Facility: HOSPITAL | Age: 83
Discharge: HOME | End: 2018-12-12
Attending: INTERNAL MEDICINE | Admitting: INTERNAL MEDICINE
Payer: MEDICARE

## 2018-12-12 VITALS
BODY MASS INDEX: 23.57 KG/M2 | HEART RATE: 71 BPM | RESPIRATION RATE: 18 BRPM | HEIGHT: 63 IN | TEMPERATURE: 98 F | OXYGEN SATURATION: 99 % | SYSTOLIC BLOOD PRESSURE: 203 MMHG | DIASTOLIC BLOOD PRESSURE: 86 MMHG | WEIGHT: 133 LBS

## 2018-12-12 DIAGNOSIS — Z87.898 HISTORY OF SYNCOPE IN CHILDHOOD: ICD-10-CM

## 2018-12-12 DIAGNOSIS — F17.200 TOBACCO DEPENDENCE: ICD-10-CM

## 2018-12-12 DIAGNOSIS — I95.1 ORTHOSTATIC HYPOTENSION: ICD-10-CM

## 2018-12-12 DIAGNOSIS — R55 RECURRENT SYNCOPE: ICD-10-CM

## 2018-12-12 PROCEDURE — C1764 EVENT RECORDER, CARDIAC: HCPCS | Performed by: INTERNAL MEDICINE

## 2018-12-12 PROCEDURE — 25000003 PHARM REV CODE 250: Performed by: INTERNAL MEDICINE

## 2018-12-12 PROCEDURE — 33282 *HC IMPLANT LOOP RECORDER: CPT | Performed by: INTERNAL MEDICINE

## 2018-12-12 PROCEDURE — 99900103 DSU ONLY-NO CHARGE-INITIAL HR (STAT)

## 2018-12-12 PROCEDURE — 33282 PR IMPLANT CARD EVENT RECRD,PT-ACT: CPT | Mod: ,,, | Performed by: INTERNAL MEDICINE

## 2018-12-12 DEVICE — IMPLANTABLE DEVICE: Type: IMPLANTABLE DEVICE | Site: CHEST | Status: FUNCTIONAL

## 2018-12-12 RX ORDER — ACETAMINOPHEN 325 MG/1
650 TABLET ORAL EVERY 4 HOURS PRN
Status: DISCONTINUED | OUTPATIENT
Start: 2018-12-12 | End: 2018-12-12 | Stop reason: HOSPADM

## 2018-12-12 RX ORDER — LIDOCAINE HYDROCHLORIDE 10 MG/ML
INJECTION, SOLUTION EPIDURAL; INFILTRATION; INTRACAUDAL; PERINEURAL
Status: DISCONTINUED | OUTPATIENT
Start: 2018-12-12 | End: 2018-12-12 | Stop reason: HOSPADM

## 2018-12-12 RX ORDER — AMLODIPINE BESYLATE 10 MG/1
TABLET ORAL
Qty: 90 TABLET | Refills: 3 | Status: SHIPPED | OUTPATIENT
Start: 2018-12-12 | End: 2019-06-06 | Stop reason: CLARIF

## 2018-12-12 NOTE — DISCHARGE SUMMARY
[facility]  Discharge Note  Short Stay    Admit Date: 12/12/2018    Discharge Date and Time: No discharge date for patient encounter.     Attending Physician: Ashok Herbert MD     Discharge Provider: Ashok Herbert    Diagnoses:  Active Hospital Problems    Diagnosis  POA    *Recurrent syncope [R55]  Yes      Resolved Hospital Problems   No resolved problems to display.       Discharged Condition: good    Hospital Course: Patient was admitted for an outpatient procedure and tolerated the procedure well with no complications.    Final Diagnoses: Same as principal problem.    Disposition: Home or Self Care    Follow up/Patient Instructions:    Medications:  Reconciled Home Medications:      Medication List      CHANGE how you take these medications    nitroGLYCERIN 0.4 MG SL tablet  Commonly known as:  NITROSTAT  Place 1 tablet (0.4 mg total) under the tongue every 5 (five) minutes as needed for Chest pain. As needed  What changed:  additional instructions        CONTINUE taking these medications    acetaminophen 325 MG tablet  Commonly known as:  TYLENOL  Take 2 tablets (650 mg total) by mouth every 4 (four) hours as needed.     albuterol-ipratropium 2.5 mg-0.5 mg/3 mL nebulizer solution  Commonly known as:  DUO-NEB  USE ONE VIAL IN NEBULIZER EVERY 6 HOURS WHILE AWAKE     amLODIPine 5 MG tablet  Commonly known as:  NORVASC  Take 0.5 tablets (2.5 mg total) by mouth nightly.     apixaban 5 mg Tab  Commonly known as:  ELIQUIS  Take 1 tablet (5 mg total) by mouth 2 (two) times daily.     aspirin 81 MG EC tablet  Commonly known as:  ECOTRIN  Take 81 mg by mouth once daily.     calcium carbonate 500 mg calcium (1,250 mg) tablet  Commonly known as:  OS-TASIA  Take 1 tablet by mouth 2 (two) times daily.     ferrous sulfate 325 mg (65 mg iron) Tab tablet  Commonly known as:  FEOSOL  Take 1 tablet (325 mg total) by mouth 2 (two) times daily with meals.     fish oil-omega-3 fatty acids 300-1,000 mg capsule  Take 2 g by  mouth every evening.     fluticasone 50 mcg/actuation nasal spray  Commonly known as:  FLONASE  2 sprays by Each Nare route once daily.     folic acid-vit B6-vit B12 2.5-25-2 mg 2.5-25-2 mg Tab  Commonly known as:  FOLBIC or Equiv  Take 1 tablet by mouth once daily.     furosemide 40 MG tablet  Commonly known as:  LASIX  Take 0.5 tablets (20 mg total) by mouth every Monday and Thursday.     gabapentin 300 MG capsule  Commonly known as:  NEURONTIN  Take 1 capsule (300 mg total) by mouth every evening.     levothyroxine 88 MCG tablet  Commonly known as:  SYNTHROID  Take 1 tablet (88 mcg total) by mouth before breakfast.     magnesium oxide 400 mg (241.3 mg magnesium) tablet  Commonly known as:  MAG-OX  TAKE ONE TABLET BY MOUTH ONCE DAILY     montelukast 10 mg tablet  Commonly known as:  SINGULAIR  Take 1 tablet (10 mg total) by mouth every evening.     multivitamin tablet  Commonly known as:  THERAGRAN  Take 1 tablet by mouth once daily.     polyethylene glycol 17 gram Pwpk  Commonly known as:  GLYCOLAX  Take 17 g by mouth 2 (two) times daily.     sertraline 25 MG tablet  Commonly known as:  ZOLOFT  Take 1 tablet (25 mg total) by mouth once daily.     simvastatin 40 MG tablet  Commonly known as:  ZOCOR  TAKE ONE TABLET BY MOUTH ONCE DAILY IN THE EVENING     SITagliptin 25 MG Tab  Commonly known as:  JANUVIA  Take 1 tablet (25 mg total) by mouth once daily.     VITAMIN C 500 MG tablet  Generic drug:  ascorbic acid (vitamin C)  Take 500 mg by mouth once daily.     vitamin D 1000 units Tab  Commonly known as:  VITAMIN D3  Take 1 tablet (1,000 Units total) by mouth once daily.          No discharge procedures on file.  Follow-up Information     Ashok Herbert MD In 1 week.    Specialty:  Cardiology  Why:  For wound re-check  Contact information:  [address] [facility]  [address]  [place] [address] [phone]                   Discharge Procedure Orders (must include Diet, Follow-up, Activity):  No discharge procedures on file.

## 2018-12-12 NOTE — PROGRESS NOTES
Patient draped sterile.  Pt tolerates procedure well.  Tropical skin adhesive applied to site with telfa and 4x4 tegaderm.  No bleeding or hematoma noted.  Discharge instructions given to patient and daughter by rep Bhandari.

## 2018-12-12 NOTE — INTERVAL H&P NOTE
The patient has been examined and the H&P has been reviewed:    I concur with the findings and no changes have occurred since H&P was written.    Anesthesia/Surgery risks, benefits and alternative options discussed and understood by patient/family.          Active Hospital Problems    Diagnosis  POA    *Recurrent syncope [R55]  Yes      Resolved Hospital Problems   No resolved problems to display.

## 2018-12-17 RX ORDER — APIXABAN 5 MG/1
TABLET, FILM COATED ORAL
Qty: 60 TABLET | Refills: 5 | Status: SHIPPED | OUTPATIENT
Start: 2018-12-17 | End: 2019-07-16 | Stop reason: SDUPTHER

## 2018-12-17 NOTE — PROGRESS NOTES
Subjective:      Patient ID: Blaire Cage is a 88 y.o. female.    Chief Complaint: Diabetic Foot Exam (3 month nail care  PCP Eli Ledbetter([place])  10/24/18); Diabetes Mellitus (A!c 6.5  10/3/18); and Foot Pain (left foot )    Blaire is a 88 y.o. female who presents to the clinic for routine evaluation and treatment of diabetic feet. Blaire has a past medical history of Anemia due to multiple mechanisms (6/29/2018), Anemia, chronic disease (6/29/2018), Anemia, chronic renal failure, stage 3 (moderate) (6/29/2018), Anticoagulant long-term use, Aortic aneurysm, CHF (congestive heart failure), COPD (chronic obstructive pulmonary disease), COPD with acute exacerbation (1/9/2015), Diabetes mellitus type II, DVT (deep venous thrombosis) (06/09/2018), Encounter for blood transfusion, 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 (8/7/2018), Hip arthritis (3/1/2016), Homocysteinemia (8/7/2018), Hyperlipidemia, Hypertension, Normocytic normochromic anemia (6/29/2018), PE (pulmonary thromboembolism) (06/09/2018), Pneumonia of right lower lobe due to infectious organism (9/11/2017), and Thyroid disease. In need of nail trimming.  Relates having occasional discomfort from toenails with wearing closed toe shoes.  Has not attempted to self treat.  Denies any additional pedal complaints.     PCP: Dr. Edmonds   Date Last Seen by PCP: 10/24/18    Current shoe gear: Rx diabetic shoes with heat molded insoles.     Hemoglobin A1C   Date Value Ref Range Status   10/03/2018 6.5 (H) 4.0 - 5.6 % Final     Comment:     ADA Screening Guidelines:  5.7-6.4%  Consistent with prediabetes  >or=6.5%  Consistent with diabetes  High levels of fetal hemoglobin interfere with the HbA1C  assay. Heterozygous hemoglobin variants (HbS, HgC, etc)do  not significantly interfere with this assay.   However, presence of multiple variants may affect accuracy.     06/27/2018 6.8 (H) 4.0 - 5.6 % Final     Comment:     ADA Screening Guidelines:  5.7-6.4%   Consistent with prediabetes  >or=6.5%  Consistent with diabetes  High levels of fetal hemoglobin interfere with the HbA1C  assay. Heterozygous hemoglobin variants (HbS, HgC, etc)do  not significantly interfere with this assay.   However, presence of multiple variants may affect accuracy.     06/08/2018 6.3 (H) 4.0 - 5.6 % Final     Comment:     ADA Screening Guidelines:  5.7-6.4%  Consistent with prediabetes  >or=6.5%  Consistent with diabetes  High levels of fetal hemoglobin interfere with the HbA1C  assay. Heterozygous hemoglobin variants (HbS, HgC, etc)do  not significantly interfere with this assay.   However, presence of multiple variants may affect accuracy.             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 8/7/2018    Hip arthritis 3/1/2016    Homocysteinemia 8/7/2018    Hyperlipidemia     Hypertension     Normocytic normochromic anemia 6/29/2018    PE (pulmonary thromboembolism) 06/09/2018    Pneumonia of right lower lobe due to infectious organism 9/11/2017    Thyroid disease     hypothyroid       Past Surgical History:   Procedure Laterality Date    APPENDECTOMY      CHOLECYSTECTOMY      ESOPHAGOGASTRODUODENOSCOPY (EGD) N/A 10/25/2017    [facility]    FRACTURE SURGERY      right hip     HERNIA REPAIR      groin    HYSTERECTOMY      INSERTION OF IMPLANTABLE LOOP RECORDER N/A 12/12/2018    Procedure: Insertion, Implantable Loop Recorder;  Surgeon: Ashok Herbert MD;   Location: [facility] CATH LAB;  Service: Cardiology;  Laterality: N/A;    Insertion, Implantable Loop Recorder N/A 12/12/2018    Per[facility] by [facility]ok [facility] at [facility] C[facility] LAB    VARICOSE VEIN SURGERY         Family History   Problem Relation Age of Onset    Hypertension Mother     Heart disease Mother     Lung disease Father     Diabetes Sister     Diabetes Brother     Kidney disease Son        Social History     Socioeconomic History    Marital status: Legally      Spouse name: None    Number of children: None    Years of education: None    Highest education level: None   Social Needs    Financial resource strain: None    Food insecurity - worry: None    Food insecurity - inability: None    Transportation needs - medical: None    Transportation needs - non-medical: None   Occupational History    None   Tobacco Use    Smoking status: Former Smoker     Packs/day: 0.35     Years: 44.00     Pack years: 15.40     Types: Cigarettes     Last attempt to quit: 4/30/2015     Years since quitting: 3.6    Smokeless tobacco: Never Used    Tobacco comment: 1/08/2015   Substance and Sexual Activity    Alcohol use: No     Alcohol/week: 0.0 oz    Drug use: No    Sexual activity: No   Other Topics Concern    None   Social History Narrative    None       Current Outpatient Medications   Medication Sig Dispense Refill    acetaminophen (TYLENOL) 325 MG tablet Take 2 tablets (650 mg total) by mouth every 4 (four) hours as needed.  0    albuterol-ipratropium (DUO-NEB) 2.5 mg-0.5 mg/3 mL nebulizer solution USE ONE VIAL IN NEBULIZER EVERY 6 HOURS WHILE AWAKE 120 vial 5    amLODIPine (NORVASC) 5 MG tablet Take 0.5 tablets (2.5 mg total) by mouth nightly.      apixaban (ELIQUIS) 5 mg Tab Take 1 tablet (5 mg total) by mouth 2 (two) times daily. 60 tablet 0    ascorbic acid (VITAMIN C) 500 MG tablet Take 500 mg by mouth once daily.        aspirin (ECOTRIN) 81 MG EC tablet Take 81 mg by mouth once daily.         calcium carbonate (OS-TASIA) 500 mg calcium (1,250 mg) tablet Take 1 tablet by mouth 2 (two) times daily.        ferrous sulfate (FEOSOL) 325 mg (65 mg iron) Tab tablet Take 1 tablet (325 mg total) by mouth 2 (two) times daily with meals. 180 tablet 3    fish oil-omega-3 fatty acids 300-1,000 mg capsule Take 2 g by mouth every evening.       fluticasone (FLONASE) 50 mcg/actuation nasal spray 2 sprays by Each Nare route once daily. 48 g 3    folic acid-vit B6-vit B12 2.5-25-2 mg (FOLBIC OR EQUIV) 2.5-25-2 mg Tab Take 1 tablet by mouth once daily. 30 tablet 6    furosemide (LASIX) 40 MG tablet Take 0.5 tablets (20 mg total) by mouth every Monday and Thursday.      gabapentin (NEURONTIN) 300 MG capsule Take 1 capsule (300 mg total) by mouth every evening. 90 capsule 3    levothyroxine (SYNTHROID) 88 MCG tablet Take 1 tablet (88 mcg total) by mouth before breakfast. 90 tablet 3    magnesium oxide (MAG-OX) 400 mg tablet TAKE ONE TABLET BY MOUTH ONCE DAILY 90 tablet 3    montelukast (SINGULAIR) 10 mg tablet Take 1 tablet (10 mg total) by mouth every evening. 90 tablet 3    multivitamin (THERAGRAN) tablet Take 1 tablet by mouth once daily.      nitroGLYCERIN (NITROSTAT) 0.4 MG SL tablet Place 1 tablet (0.4 mg total) under the tongue every 5 (five) minutes as needed for Chest pain. As needed (Patient taking differently: Place 0.4 mg under the tongue every 5 (five) minutes as needed for Chest pain. Seek medical help if pain is not relieved after the third dose.) 30 tablet 3    polyethylene glycol (GLYCOLAX) 17 gram PwPk Take 17 g by mouth 2 (two) times daily. 60 packet 0    sertraline (ZOLOFT) 25 MG tablet Take 1 tablet (25 mg total) by mouth once daily. 30 tablet 0    simvastatin (ZOCOR) 40 MG tablet TAKE ONE TABLET BY MOUTH ONCE DAILY IN THE EVENING 90 tablet 3    SITagliptin (JANUVIA) 25 MG Tab Take 1 tablet (25 mg total) by mouth once daily. 30 tablet 5    vitamin D 1000 units Tab Take 1 tablet (1,000  Units total) by mouth once daily. 90 tablet 3    amLODIPine (NORVASC) 10 MG tablet TAKE ONE TABLET BY MOUTH ONCE DAILY 90 tablet 3     No current facility-administered medications for this visit.        Review of patient's allergies indicates:   Allergen Reactions    Carvedilol Other (See Comments)     Bradycardia and syncope    Boniva [ibandronate]     Codeine     Hydralazine analogues     Iodinated contrast media - iv dye Hives    Lisinopril     Morphine          Review of Systems   Constitution: Negative for chills and fever.   Cardiovascular: Negative for claudication and leg swelling.   Skin: Positive for color change and nail changes.   Musculoskeletal: Positive for arthritis, joint pain and joint swelling.   Neurological: Positive for paresthesias. Negative for numbness.   Psychiatric/Behavioral: Negative for altered mental status.           Objective:      Physical Exam   Constitutional: She is oriented to person, place, and time. She appears well-developed and well-nourished. No distress.   Cardiovascular:   Pulses:       Dorsalis pedis pulses are 2+ on the right side, and 2+ on the left side.        Posterior tibial pulses are 1+ on the right side, and 1+ on the left side.   CFT <3 seconds bilateral.  Pedal hair growth decreased bilateral.  Varicosities noted bilateral.  Mild nonpitting edema noted bilateral.  Toes are cool to touch bilateral.     Musculoskeletal: She exhibits edema. She exhibits no tenderness.   Pain on palpation plantar medial and central heel left foot. No pain with ROM or MMT. No pain with medial and lateral compression of heel.    Muscle strength 5/5 in all muscle groups bilateral.  No tenderness nor crepitation with ROM of foot/ankle joints right side.  No tenderness with palpation of right foot and ankle.  Bilateral pes planus.  Bilateral hallux abducto valgus.  Bilateral semi-rigid contracture of toes 2-5.     Neurological: She is alert and oriented to person, place, and  time. She has normal strength. A sensory deficit is present.   Protective sensation per Cadogan-Carmen monofilament intact bilateral.    Vibratory sensation decreased bilateral.    Light touch intact bilateral.   Skin: Skin is warm, dry and intact. Lesion noted. No abrasion, no bruising, no burn, no ecchymosis, no laceration, no petechiae and no rash noted. She is not diaphoretic. No cyanosis or erythema. No pallor. Nails show no clubbing.   Xerosis of pedal skin bilateral.  Pedal skin appears thin and atrophic bilateral. Toenails x 10 appear thickened by 3 mm's, elongated by 3 mm's, and discolored with subungual debris.  Hemosiderin staining noted to bilateral lower extremity.      Examination of the skin reveals no evidence of significant maceration, rashes, open lesions, suspicious appearing nevi or other concerning lesions.              Assessment:       Encounter Diagnoses   Name Primary?    Diabetic polyneuropathy associated with type 2 diabetes mellitus Yes    Onychomycosis due to dermatophyte     Type II diabetes mellitus with peripheral circulatory disorder          Plan:       Blaire was seen today for diabetic foot exam, diabetes mellitus and foot pain.    Diagnoses and all orders for this visit:    Diabetic polyneuropathy associated with type 2 diabetes mellitus    Onychomycosis due to dermatophyte    Type II diabetes mellitus with peripheral circulatory disorder      I counseled the patient on her conditions, their implications and medical management.    Advised to continue wearing diabetic shoes as they accommodate for digital deformities.      Shoe inspection. Diabetic Foot Education. Patient reminded of the importance of good nutrition and blood sugar control to help prevent podiatric complications of diabetes. Patient instructed on proper foot hygeine. We discussed wearing proper shoe gear, daily foot inspections, never walking without protective shoe gear, never putting sharp instruments to  feet    With patient's permission, nails were aggressively reduced and debrided x 10 to their soft tissue attachment mechanically removing all offending nail and debris.  Patient relates relief following the procedure. She will continue to monitor the areas daily, inspect her feet, wear protective shoe gear when ambulatory, moisturizer to maintain skin integrity.    Return 3 months routine care    Emerson Chan DPM

## 2018-12-18 ENCOUNTER — TELEPHONE (OUTPATIENT)
Dept: CARDIOLOGY | Facility: CLINIC | Age: 83
End: 2018-12-18

## 2018-12-18 NOTE — TELEPHONE ENCOUNTER
----- Message from Kimber Vu sent at 12/18/2018  3:40 PM CST -----  Type: Needs Medical Advice    Who Called:  Patient  Symptoms (please be specific):  Pain surgery site  How long has patient had these symptoms:  Since surgery  Pharmacy name and phone #:    [id number] [place] [address] [place] [address]  Phone: [phone] Fax: [phone]  Best Call Back Number: [phone] (home)     Additional Information: Stating having pain since procedure, would like advise on what she can take

## 2018-12-18 NOTE — TELEPHONE ENCOUNTER
Called and spoke with pts daughter. She stated Ms. Rudd has linda having pain at her surgical site. I offered her an appt to come in tomorrow 12/19/18 @ 1:30 to let us look at it. She accepted. No further issues noted.

## 2018-12-19 ENCOUNTER — CLINICAL SUPPORT (OUTPATIENT)
Dept: CARDIOLOGY | Facility: CLINIC | Age: 83
End: 2018-12-19
Attending: INTERNAL MEDICINE
Payer: MEDICARE

## 2018-12-19 DIAGNOSIS — Z95.818 STATUS POST PLACEMENT OF IMPLANTABLE LOOP RECORDER: ICD-10-CM

## 2018-12-19 DIAGNOSIS — R55 RECURRENT SYNCOPE: ICD-10-CM

## 2018-12-19 DIAGNOSIS — Z95.818 STATUS POST PLACEMENT OF IMPLANTABLE LOOP RECORDER: Primary | ICD-10-CM

## 2018-12-19 PROCEDURE — 93291 INTERROG DEV EVAL SCRMS IP: CPT | Mod: S$GLB,,, | Performed by: INTERNAL MEDICINE

## 2018-12-24 ENCOUNTER — NURSE TRIAGE (OUTPATIENT)
Dept: ADMINISTRATIVE | Facility: CLINIC | Age: 83
End: 2018-12-24

## 2018-12-24 DIAGNOSIS — E11.40 TYPE 2 DIABETES MELLITUS WITH DIABETIC NEUROPATHY, WITHOUT LONG-TERM CURRENT USE OF INSULIN: ICD-10-CM

## 2018-12-24 NOTE — TELEPHONE ENCOUNTER
Reason for Disposition   Nursing judgment    Protocols used: ST NO PROTOCOL CALL: SICK ADULT-A-[place]  Spoke to Toya patient's daughter. She states that Ms. Cage had a knot that has increased in size since follow up, that can be seen through the skin near loop recorder.  Caller advised to bring patient to the ED. Patient was not present with caller for triage.

## 2018-12-26 ENCOUNTER — HOSPITAL ENCOUNTER (EMERGENCY)
Facility: HOSPITAL | Age: 83
Discharge: HOME | End: 2018-12-26
Attending: EMERGENCY MEDICINE
Payer: MEDICARE

## 2018-12-26 ENCOUNTER — DOCUMENTATION ONLY (OUTPATIENT)
Dept: CARDIOLOGY | Facility: CLINIC | Age: 83
End: 2018-12-26

## 2018-12-26 VITALS
RESPIRATION RATE: 16 BRPM | BODY MASS INDEX: 23.56 KG/M2 | WEIGHT: 133 LBS | HEART RATE: 76 BPM | OXYGEN SATURATION: 97 % | DIASTOLIC BLOOD PRESSURE: 72 MMHG | SYSTOLIC BLOOD PRESSURE: 194 MMHG | TEMPERATURE: 98 F

## 2018-12-26 DIAGNOSIS — Z51.89 WOUND CHECK, ABSCESS: Primary | ICD-10-CM

## 2018-12-26 PROCEDURE — 99281 EMR DPT VST MAYX REQ PHY/QHP: CPT

## 2018-12-26 RX ORDER — GABAPENTIN 300 MG/1
CAPSULE ORAL
Qty: 90 CAPSULE | Refills: 3 | Status: SHIPPED | OUTPATIENT
Start: 2018-12-26 | End: 2019-11-29 | Stop reason: SDUPTHER

## 2018-12-26 NOTE — ED NOTES
Pt presents to ED POV from home accompanied by family with c/o pain and swelling to left chest wall. The pt reports that she has had increasing pain and swelling after having a loop recorder placed. Pt is AAOx4. Skin warm, dry to touch. Respirations even, nonlabored. NAD noted. Pt denies other complaints.

## 2018-12-26 NOTE — PROGRESS NOTES
Pt brought into clinic stating swelling around loop recorder has increased in pain and size.  Daughter is present with patient.  ILR insertion site assessed, pt grimacing with palpation, approx quarter-sized raised swelling noted which appears c/w hematoma.  Scabbing noted over incision.  Site also appears to have more bruising than previously noted.  ILR placement appears stable, device is not visible. Pt does not report any fevers.  Pt was advised to go to the ER.  Patient and daughter verbalize understanding.

## 2018-12-26 NOTE — ED PROVIDER NOTES
"Encounter Date: 12/26/2018    SCRIBE #1 NOTE: Puja WOOD am scribing for, and in the presence of, Dr. Guadalupe .       History     Chief Complaint   Patient presents with    Wound Check     Redness and swelling around loop recorder that was place two weeks ago       Time seen by provider: 11:43 AM on 12/26/2018    Blaire Cage is a 88 y.o. female with a hx of HTN, CHF, PE, DVT, and HLD who presents to the ED with c/o redness, bruising, swelling and pain around the loop recorder sight. The recorder was placed in 2 weeks. Daughter notes the swelling and pain started "less than a week" after the recorder was placed. She notes it "feels like a lump." Pt is on Eliquis. Allergens include Carvedilol, Boniva, Codeine, Iodinated contrast, Lisinopril, and Morphine.  Sent to the emergency room by Cardiology Clinic nurse.      The history is provided by the patient.     Review of patient's allergies indicates:   Allergen Reactions    Carvedilol Other (See Comments)     Bradycardia and syncope    Boniva [ibandronate]     Codeine     Hydralazine analogues     Iodinated contrast- oral and iv dye Hives    Kionex [sodium polystyrene sulfonate] Diarrhea     From encounter 12/11/17 for diarrhea--not true allergy.   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 8/7/2018    Hip arthritis 3/1/2016    Homocysteinemia 8/7/2018    Hyperlipidemia     Hypertension     Normocytic normochromic anemia 6/29/2018    " PE (pulmonary thromboembolism) 06/09/2018    Pneumonia of right lower lobe due to infectious organism 9/11/2017    Thyroid disease     hypothyroid     Past Surgical History:   Procedure Laterality Date    APPENDECTOMY      CHOLECYSTECTOMY      ESOPHAGOGASTRODUODENOSCOPY (EGD) N/A 10/25/2017    [facility]O    FRACTURE SURGERY      right hip     HERNIA REPAIR      groin    HYSTERECTOMY      Insertion, Implantable Loop Recorder N/A 12/12/2018    [facility] by [facility]ok Herbert [facility] at [facility] CATH LAB    VARICOSE VEIN SURGERY       Family History   Problem Relation Age of Onset    Hypertension Mother     Heart disease Mother     Lung disease Father     Diabetes Sister     Diabetes Brother     Kidney disease Son      Social History     Tobacco Use    Smoking status: Former Smoker     Packs/day: 0.35     Years: 44.00     Pack years: 15.40     Types: Cigarettes     Last attempt to quit: 4/30/2015     Years since quitting: 3.6    Smokeless tobacco: Never Used    Tobacco comment: 1/08/2015   Substance Use Topics    Alcohol use: No     Alcohol/week: 0.0 oz    Drug use: No     Review of Systems   Constitutional: Negative for fever.   Respiratory: Negative for shortness of breath.    Cardiovascular: Negative for chest pain.   Gastrointestinal: Negative for nausea.   Skin: Positive for color change (bruising and redness) and wound. Negative for rash.   Hematological: Bruises/bleeds easily.       Physical Exam     Initial Vitals [12/26/18 1112]   BP Pulse Resp Temp SpO2   (!) 194/72 76 16 98 °F (36.7 °C) 97 %      MAP       --         Physical Exam    Nursing note and vitals reviewed.  Constitutional: She appears well-developed and well-nourished.   HENT:   Head: Normocephalic and atraumatic.   Eyes: EOM are normal.   Neck: Normal range of motion. Neck supple.   Cardiovascular: Normal rate, regular rhythm and normal heart sounds. Exam reveals no gallop and no friction rub.    No  murmur heard.  Pulmonary/Chest: Breath sounds normal. No respiratory distress. She has no wheezes. She has no rhonchi. She has no rales.   Musculoskeletal: Normal range of motion.   Neurological: She is alert and oriented to person, place, and time.   Skin: Skin is warm and dry. Bruising noted. No erythema.   Discoloration and bruising surrounding left chest loop. No erythema, calor, and purulent drainage.    Psychiatric: She has a normal mood and affect. Her behavior is normal. Judgment and thought content normal.         ED Course   Procedures  Labs Reviewed - No data to display       Imaging Results    None          Medical Decision Making:   History:   Old Medical Records: I decided to obtain old medical records.            Scribe Attestation:   Scribe #1: I performed the above scribed service and the documentation accurately describes the services I performed. I attest to the accuracy of the note.    I, Dr. Guadalupe, personally performed the services described in this documentation. All medical record entries made by the scribe were at my direction and in my presence.  I have reviewed the chart and agree that the record reflects my personal performance and is accurate and complete.5:11 PM 12/26/2018            ED Course as of Dec 26 1451   Wed Dec 26, 2018   1136 BP: (!) 194/72 [EF]   1136 Temp: 98 °F (36.7 °C) [EF]   1136 Temp src: Oral [EF]   1136 Pulse: 76 [EF]   1136 Resp: 16 [EF]   1136 SpO2: 97 % [EF]   1150 Patient presents to the ER with left chest tenderness at the site over recent loop recorder placement.  Case d/w dr benz, ok to [place] home. Tenderness and bruising typical after loop recorder and patient is on eliquis.  I Do not see any erythema there is no calor no purulent drainage no indication for any labs or imaging.  Patient can be discharged home, no antibiotics.  [EF]      ED Course User Index  [EF] Tayo Guadalupe MD     Clinical Impression:     1. Wound check, abscess          Disposition:    Disposition: Discharged  Condition: Stable                        Tayo Guadalupe MD  12/26/18 0365

## 2019-01-02 ENCOUNTER — TELEPHONE (OUTPATIENT)
Dept: FAMILY MEDICINE | Facility: CLINIC | Age: 84
End: 2019-01-02

## 2019-01-02 NOTE — TELEPHONE ENCOUNTER
Attempted to call patient, no answer, left voicemail with detailed message and call back number.

## 2019-01-02 NOTE — TELEPHONE ENCOUNTER
----- Message from Eliseoaimeekely Gonzales sent at 1/2/2019  3:54 PM CST -----  Contact: Ramonita with [facility]   Type:  Sooner Apoointment Request    Caller is requesting a sooner appointment.  Caller declined first available appointment listed below.  Caller will not accept being placed on the waitlist and is requesting a message be sent to doctor.    Name of Caller:  Ramonita with [facility]   When is the first available appointment?  1/7  Symptoms:  Productive cough, yellow phlegm  Best Call Back Number: [phone]      Additional Information: patient need to see someone this week

## 2019-01-03 NOTE — TELEPHONE ENCOUNTER
Spoke with Ramonita with [facility]. states patient has been having a productive cough with yellow sputum. States she believes the patient should be seen, especially with the amount of pneumonia they have been seeing. Scheduled appointment for 1/4

## 2019-01-03 NOTE — TELEPHONE ENCOUNTER
----- Message from Leonie Tovar sent at 1/2/2019  4:46 PM CST -----  Contact: Ramonita mcnally/ [facility]  Type:  Patient Returning Call    Who Called:  Ramonita  Who Left Message for Patient:  Yuki  Does the patient know what this is regarding?:  An appt  Best Call Back Number:  [phone]  Additional Information:  Please advise--thank you

## 2019-01-04 ENCOUNTER — OFFICE VISIT (OUTPATIENT)
Dept: FAMILY MEDICINE | Facility: CLINIC | Age: 84
End: 2019-01-04
Payer: MEDICARE

## 2019-01-04 ENCOUNTER — DOCUMENTATION ONLY (OUTPATIENT)
Dept: FAMILY MEDICINE | Facility: CLINIC | Age: 84
End: 2019-01-04

## 2019-01-04 VITALS
SYSTOLIC BLOOD PRESSURE: 148 MMHG | TEMPERATURE: 98 F | HEIGHT: 63 IN | WEIGHT: 132.5 LBS | HEART RATE: 70 BPM | DIASTOLIC BLOOD PRESSURE: 72 MMHG | BODY MASS INDEX: 23.48 KG/M2

## 2019-01-04 DIAGNOSIS — J20.9 COPD (CHRONIC OBSTRUCTIVE PULMONARY DISEASE) WITH ACUTE BRONCHITIS: Primary | ICD-10-CM

## 2019-01-04 DIAGNOSIS — J44.0 COPD (CHRONIC OBSTRUCTIVE PULMONARY DISEASE) WITH ACUTE BRONCHITIS: Primary | ICD-10-CM

## 2019-01-04 PROCEDURE — 96372 PR INJECTION,THERAP/PROPH/DIAG2ST, IM OR SUBCUT: ICD-10-PCS | Mod: 59,S$GLB,, | Performed by: PHYSICIAN ASSISTANT

## 2019-01-04 PROCEDURE — 99214 OFFICE O/P EST MOD 30 MIN: CPT | Mod: 25,S$GLB,, | Performed by: PHYSICIAN ASSISTANT

## 2019-01-04 PROCEDURE — 1101F PR PT FALLS ASSESS DOC 0-1 FALLS W/OUT INJ PAST YR: ICD-10-PCS | Mod: CPTII,S$GLB,, | Performed by: PHYSICIAN ASSISTANT

## 2019-01-04 PROCEDURE — 99214 PR OFFICE/OUTPT VISIT, EST, LEVL IV, 30-39 MIN: ICD-10-PCS | Mod: 25,S$GLB,, | Performed by: PHYSICIAN ASSISTANT

## 2019-01-04 PROCEDURE — 96372 THER/PROPH/DIAG INJ SC/IM: CPT | Mod: 59,S$GLB,, | Performed by: PHYSICIAN ASSISTANT

## 2019-01-04 PROCEDURE — 99999 PR PBB SHADOW E&M-EST. PATIENT-LVL V: ICD-10-PCS | Mod: PBBFAC,,, | Performed by: PHYSICIAN ASSISTANT

## 2019-01-04 PROCEDURE — 99999 PR PBB SHADOW E&M-EST. PATIENT-LVL V: CPT | Mod: PBBFAC,,, | Performed by: PHYSICIAN ASSISTANT

## 2019-01-04 PROCEDURE — 1101F PT FALLS ASSESS-DOCD LE1/YR: CPT | Mod: CPTII,S$GLB,, | Performed by: PHYSICIAN ASSISTANT

## 2019-01-04 RX ORDER — DOXYCYCLINE 100 MG/1
100 CAPSULE ORAL EVERY 12 HOURS
Qty: 20 CAPSULE | Refills: 0 | Status: SHIPPED | OUTPATIENT
Start: 2019-01-04 | End: 2019-03-22 | Stop reason: CLARIF

## 2019-01-04 RX ORDER — BETAMETHASONE SODIUM PHOSPHATE AND BETAMETHASONE ACETATE 3; 3 MG/ML; MG/ML
6 INJECTION, SUSPENSION INTRA-ARTICULAR; INTRALESIONAL; INTRAMUSCULAR; SOFT TISSUE
Status: COMPLETED | OUTPATIENT
Start: 2019-01-04 | End: 2019-01-04

## 2019-01-04 RX ADMIN — BETAMETHASONE SODIUM PHOSPHATE AND BETAMETHASONE ACETATE 6 MG: 3; 3 INJECTION, SUSPENSION INTRA-ARTICULAR; INTRALESIONAL; INTRAMUSCULAR; SOFT TISSUE at 02:01

## 2019-01-04 NOTE — PROGRESS NOTES
Pre-Visit Chart Review  For Appointment Scheduled on 01/04/2019    Health Maintenance Due   Topic Date Due    TETANUS VACCINE  07/21/1948    Eye Exam  10/30/2016    Lipid Panel  05/23/2018

## 2019-01-04 NOTE — PROGRESS NOTES
Subjective:       Patient ID: Blaire Cage is a 88 y.o. female.    Chief Complaint: Cough    Cough   This is a recurrent problem. The current episode started in the past 7 days. The problem has been gradually improving. The problem occurs hourly. The cough is productive of sputum. Associated symptoms include postnasal drip and wheezing. Pertinent negatives include no chest pain, chills, ear congestion, ear pain, fever, headaches, heartburn, hemoptysis, myalgias, nasal congestion, rash, rhinorrhea, sore throat, shortness of breath, sweats or weight loss. Nothing aggravates the symptoms. Risk factors for lung disease include animal exposure and smoking/tobacco exposure. She has tried a beta-agonist inhaler for the symptoms. The treatment provided moderate relief. Her past medical history is significant for bronchitis, COPD and pneumonia.     Review of Systems   Constitutional: Negative for chills, fever and weight loss.   HENT: Positive for postnasal drip. Negative for ear pain, rhinorrhea and sore throat.    Respiratory: Positive for cough and wheezing. Negative for hemoptysis and shortness of breath.    Cardiovascular: Negative for chest pain.   Gastrointestinal: Negative for heartburn.   Musculoskeletal: Negative for myalgias.   Skin: Negative for rash.   Neurological: Negative for headaches.       Objective:      Physical Exam   Constitutional: She appears well-developed and well-nourished. No distress.   HENT:   Head: Normocephalic and atraumatic.   Right Ear: Tympanic membrane, external ear and ear canal normal.   Left Ear: Tympanic membrane, external ear and ear canal normal.   Nose: No mucosal edema or rhinorrhea. Right sinus exhibits no maxillary sinus tenderness and no frontal sinus tenderness. Left sinus exhibits no maxillary sinus tenderness and no frontal sinus tenderness.   Mouth/Throat: Oropharynx is clear and moist. Mucous membranes are not dry. No oropharyngeal exudate, posterior oropharyngeal  edema or posterior oropharyngeal erythema.   Cardiovascular: Normal rate, regular rhythm and normal heart sounds.   No murmur heard.  Pulmonary/Chest: Effort normal. Wheezes: few anterior wheezes  She has rhonchi (cleared post tussive ) in the right middle field and the right lower field. She has no rales. Chest wall is not dull to percussion. She exhibits no tenderness.   Musculoskeletal:        Right lower leg: She exhibits no edema.        Left lower leg: She exhibits no edema.   Lymphadenopathy:        Head (right side): No tonsillar adenopathy present.        Head (left side): No tonsillar adenopathy present.     She has no cervical adenopathy.   Skin: Skin is warm and dry. Capillary refill takes less than 2 seconds. No cyanosis. Nails show no clubbing.   Nursing note and vitals reviewed.      Assessment:       1. COPD (chronic obstructive pulmonary disease) with acute bronchitis        Plan:       Blaire was seen today for cough.    Diagnoses and all orders for this visit:    COPD (chronic obstructive pulmonary disease) with acute bronchitis  -     doxycycline (VIBRAMYCIN) 100 MG Cap; Take 1 capsule (100 mg total) by mouth every 12 (twelve) hours.    -     betamethasone acetate-betamethasone sodium phosphate injection 6 mg    Duo neb every 6 hours while awake  ER if symptoms worsen   Follow up as scheduled

## 2019-01-07 DIAGNOSIS — J20.9 COPD (CHRONIC OBSTRUCTIVE PULMONARY DISEASE) WITH ACUTE BRONCHITIS: Primary | ICD-10-CM

## 2019-01-07 DIAGNOSIS — J44.0 COPD (CHRONIC OBSTRUCTIVE PULMONARY DISEASE) WITH ACUTE BRONCHITIS: Primary | ICD-10-CM

## 2019-01-07 RX ORDER — PREDNISONE 20 MG/1
20 TABLET ORAL DAILY
Qty: 10 TABLET | Refills: 0 | Status: SHIPPED | OUTPATIENT
Start: 2019-01-07 | End: 2019-01-08 | Stop reason: SDUPTHER

## 2019-01-08 ENCOUNTER — OFFICE VISIT (OUTPATIENT)
Dept: PULMONOLOGY | Facility: CLINIC | Age: 84
End: 2019-01-08
Payer: MEDICARE

## 2019-01-08 VITALS
HEART RATE: 72 BPM | WEIGHT: 128.5 LBS | BODY MASS INDEX: 22.77 KG/M2 | HEIGHT: 63 IN | OXYGEN SATURATION: 97 % | DIASTOLIC BLOOD PRESSURE: 69 MMHG | SYSTOLIC BLOOD PRESSURE: 163 MMHG

## 2019-01-08 DIAGNOSIS — J44.89 ASTHMA-COPD OVERLAP SYNDROME: Primary | ICD-10-CM

## 2019-01-08 DIAGNOSIS — J82.83 EOSINOPHILIC ASTHMA: ICD-10-CM

## 2019-01-08 DIAGNOSIS — J44.0 COPD (CHRONIC OBSTRUCTIVE PULMONARY DISEASE) WITH ACUTE BRONCHITIS: ICD-10-CM

## 2019-01-08 DIAGNOSIS — J20.9 COPD (CHRONIC OBSTRUCTIVE PULMONARY DISEASE) WITH ACUTE BRONCHITIS: ICD-10-CM

## 2019-01-08 PROCEDURE — 1101F PR PT FALLS ASSESS DOC 0-1 FALLS W/OUT INJ PAST YR: ICD-10-PCS | Mod: CPTII,S$GLB,, | Performed by: INTERNAL MEDICINE

## 2019-01-08 PROCEDURE — 99499 RISK ADDL DX/OHS AUDIT: ICD-10-PCS | Mod: S$GLB,,, | Performed by: INTERNAL MEDICINE

## 2019-01-08 PROCEDURE — 99999 PR PBB SHADOW E&M-EST. PATIENT-LVL IV: ICD-10-PCS | Mod: PBBFAC,,, | Performed by: INTERNAL MEDICINE

## 2019-01-08 PROCEDURE — 99999 PR PBB SHADOW E&M-EST. PATIENT-LVL IV: CPT | Mod: PBBFAC,,, | Performed by: INTERNAL MEDICINE

## 2019-01-08 PROCEDURE — 99213 PR OFFICE/OUTPT VISIT, EST, LEVL III, 20-29 MIN: ICD-10-PCS | Mod: S$GLB,,, | Performed by: INTERNAL MEDICINE

## 2019-01-08 PROCEDURE — 99213 OFFICE O/P EST LOW 20 MIN: CPT | Mod: S$GLB,,, | Performed by: INTERNAL MEDICINE

## 2019-01-08 PROCEDURE — 1101F PT FALLS ASSESS-DOCD LE1/YR: CPT | Mod: CPTII,S$GLB,, | Performed by: INTERNAL MEDICINE

## 2019-01-08 PROCEDURE — 99499 UNLISTED E&M SERVICE: CPT | Mod: S$GLB,,, | Performed by: INTERNAL MEDICINE

## 2019-01-08 RX ORDER — FLUTICASONE PROPIONATE AND SALMETEROL 232; 14 UG/1; UG/1
1 POWDER, METERED RESPIRATORY (INHALATION) 2 TIMES DAILY
Qty: 1 EACH | Refills: 11 | Status: SHIPPED | OUTPATIENT
Start: 2019-01-08 | End: 2019-07-18

## 2019-01-08 RX ORDER — PREDNISONE 20 MG/1
TABLET ORAL
Qty: 15 TABLET | Refills: 0 | Status: SHIPPED | OUTPATIENT
Start: 2019-01-08 | End: 2019-02-20 | Stop reason: SDUPTHER

## 2019-01-08 NOTE — PATIENT INSTRUCTIONS
Get fluticasone - salmeterol inhaler and use 1 twice daily - if covered.  May prevent lungs from getting sick.    Use prednisone daily for 3 days if bronchitis flares.    If yellow mucous may need antibiotic.

## 2019-01-08 NOTE — PROGRESS NOTES
"1/8/2019    Blaire Cage       Chief Complaint   Patient presents with    Follow-up     6 month. Would like to know if amlodipine comes in 2.5 mg pills. Discuss prednisone prescription, says she had injection in office yesterday. Should she be taking pills by mouth also?       HPI:    1/8/2019- no prednisone recently, has cough with mucous yellow to clear now- lungs worsened 2 wks ago and saw NP [facility] and got celestone shot.        July 2, 2018- had pe /dvt dx early June by v/q and u/s.  Took prednisone just once.      Discussed with patient above for education the following:      Use prednisone if needed. Lungs are doing well,     You had had foot swelling but no symptoms for 2 weeks- blood clot in leg went to lung.      Stay on blood thinners, usually 6 months minimum course, may be wise to stay on therapy?    Dec 20, 2017Smoked late life, no h/o asthma.  Lives alone with daughters /grandkids in and out.   No 02- off smokes a yr.  Has sinus problems chr with good flonase response.  Mucous is clear with no c/o excess cough and no wheezes.  Breathing felt to be good usually.  Uses resp rx bid.  No hosp for lungs- h/o  Fainting.  Has had very positive result from prednisone.  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 8/7/2018    Hip arthritis 3/1/2016    Homocysteinemia 8/7/2018    Hyperlipidemia     Hypertension     Normocytic normochromic anemia 6/29/2018    PE " "(pulmonary thromboembolism) 06/09/2018    Pneumonia of right lower lobe due to infectious organism 9/11/2017    Thyroid disease     hypothyroid         Past Surgical History:   Procedure Laterality Date    APPENDECTOMY      CHOLECYSTECTOMY      ESOPHAGOGASTRODUODENOSCOPY (EGD) N/A 10/25/2017    Performed by Fadi Flores [facility] at [facility] ENDO    FRACTURE SURGERY      right hip     HERNIA REPAIR      groin    HYSTERECTOMY      Insertion, Implantable Loop Recorder N/A 12/12/2018    Performed by Ashok Herbert MD at [facility] CATH LAB    VARICOSE VEIN SURGERY       Social History     Tobacco Use    Smoking status: Former Smoker     Packs/day: 0.35     Years: 44.00     Pack years: 15.40     Types: Cigarettes     Last attempt to quit: 4/30/2015     Years since quitting: 3.6    Smokeless tobacco: Never Used    Tobacco comment: 1/08/2015   Substance Use Topics    Alcohol use: No     Alcohol/week: 0.0 oz    Drug use: No     Family History   Problem Relation Age of Onset    Hypertension Mother     Heart disease Mother     Lung disease Father     Diabetes Sister     Diabetes Brother     Kidney disease Son      Review of patient's allergies indicates:   Allergen Reactions    Carvedilol Other (See Comments)     Bradycardia and syncope    Boniva [ibandronate]     Codeine     Hydralazine analogues     Iodinated contrast- oral and iv dye Hives    Kionex [sodium polystyrene sulfonate]     Lisinopril     Morphine        Performance Status:The patient's activity level is functions out of house.      Review of Systems:  a review of eleven systems covering constitutional, Eye, HEENT, Psych, Respiratory, Cardiac, GI, , Musculoskeletal, Endocrine, Dermatologic was negative except for pertinent findings as listed ABOVE and below: all good save above, was on dm rx, on bone rx      Exam:Comprehensive exam done. BP (!) 163/69 (BP Location: Left arm, Patient Position: Sitting)   Pulse 72   Ht 5' 3" (1.6 m)   Wt 58.3 " kg (128 lb 8.5 oz)   LMP  (LMP Unknown)   SpO2 97% Comment: on room air  BMI 22.77 kg/m²   Exam included Vitals as listed, and patient's appearance and affect and alertness and mood, oral exam for yeast and hygiene and pharynx lesions and Mallapatti (M) score, neck with inspection for jvd and masses and thyroid abnormalities and lymph nodes (supraclavicular and infraclavicular nodes and axillary also examined and noted if abn), chest exam included symmetry and effort and fremitus and percussion and auscultation, cardiac exam included rhythm and gallops and murmur and rubs and jvd and edema, abdominal exam for mass and hepatosplenomegaly and tenderness and hernias and bowel sounds, Musculoskeletal exam with muscle tone and posture and mobility/gait and  strength, and skin for rashes and cyanosis and pallor and turgor, extremity for clubbing.  Findings were normal except for pertinent findings listed below:  M1, slender, nl pharynx, no neck abn, symmetric chest, nl fremitus/percussion, good bs, RRR with no  Murmur or gallop or rub, no jvd nor edema, noclubbing, alert and moves all 4, euthymic    Radiographs (ct chest and cxr) reviewed: results reviewed  - ct chest viewed and nad. .lung scan June indeterminate.  Pos leg study early June 2018-  Impression       This represents an intermediate probability V/Q scan for the presence of pulmonary embolism.  Consider additional evaluation with lower extremity venous ultrasound or CTA chest.      Electronically signed by: Aubrey Davis MD  Date: 06/09/2018  Time: 09:08     Impression       1. Interval development of enlargement of the left popliteal vein and nonocclusive peripheral thrombus within the left popliteal vein focally.  2. Complete thrombosis of a superficial venous varicosity in the left popliteal fossa.  3. Venous reflux within the left popliteal vein is a result of an incompetent venous valve.  This report was flagged in Epic as  abnormal.      Electronically signed by: Aubrey Davsi MD  Date: 06/09/2018  Time: 15:47       Labs none available      eos have been up to 400!  PFT results reviewed  10/30/2017- fev 52% 0.82 with 12% bd response.    Plan:  Clinical impression is apparently straight forward and impression with management as below.    Blaire was seen today for follow-up.    Diagnoses and all orders for this visit:    Asthma-COPD overlap syndrome  -     predniSONE (DELTASONE) 20 MG tablet; Take one daily for 3 days then repeat for bronchitis  -     fluticasone-salmeterol 232-14 mcg/actuation AePB; Inhale 1 puff into the lungs 2 (two) times daily.    Eosinophilic asthma  -     predniSONE (DELTASONE) 20 MG tablet; Take one daily for 3 days then repeat for bronchitis    COPD (chronic obstructive pulmonary disease) with acute bronchitis  -     predniSONE (DELTASONE) 20 MG tablet; Take one daily for 3 days then repeat for bronchitis        Follow-up in about 6 months (around 7/8/2019), or if symptoms worsen or fail to improve.    Discussed with patient above for education the following:      Get fluticasone - salmeterol inhaler and use 1 twice daily - if covered.  May prevent lungs from getting sick.    Use prednisone daily for 3 days if bronchitis flares.    If yellow mucous may need antibiotic.

## 2019-01-14 ENCOUNTER — LAB VISIT (OUTPATIENT)
Dept: LAB | Facility: HOSPITAL | Age: 84
End: 2019-01-14
Attending: INTERNAL MEDICINE
Payer: MEDICARE

## 2019-01-14 DIAGNOSIS — E86.9 VOLUME DEPLETION, UNSPECIFIED: ICD-10-CM

## 2019-01-14 DIAGNOSIS — N18.9 ANEMIA OF CHRONIC RENAL FAILURE: ICD-10-CM

## 2019-01-14 DIAGNOSIS — D63.1 ANEMIA OF CHRONIC RENAL FAILURE: ICD-10-CM

## 2019-01-14 DIAGNOSIS — N39.0 URINARY TRACT INFECTION, SITE NOT SPECIFIED: Primary | ICD-10-CM

## 2019-01-14 DIAGNOSIS — R80.9 PROTEINURIA: ICD-10-CM

## 2019-01-14 DIAGNOSIS — N20.0 URIC ACID NEPHROLITHIASIS: ICD-10-CM

## 2019-01-14 DIAGNOSIS — R60.9 EDEMA: ICD-10-CM

## 2019-01-14 DIAGNOSIS — M81.0 SENILE OSTEOPOROSIS: ICD-10-CM

## 2019-01-14 DIAGNOSIS — E78.5 HYPERLIPEMIA: ICD-10-CM

## 2019-01-14 DIAGNOSIS — D64.9 ANEMIA, UNSPECIFIED: ICD-10-CM

## 2019-01-14 DIAGNOSIS — N25.81 SECONDARY HYPERPARATHYROIDISM OF RENAL ORIGIN: ICD-10-CM

## 2019-01-14 DIAGNOSIS — I10 ESSENTIAL HYPERTENSION, MALIGNANT: ICD-10-CM

## 2019-01-14 LAB
ALBUMIN SERPL BCP-MCNC: 3.5 G/DL
ANION GAP SERPL CALC-SCNC: 9 MMOL/L
BASOPHILS # BLD AUTO: 0.04 K/UL
BASOPHILS NFR BLD: 0.5 %
BUN SERPL-MCNC: 68 MG/DL
CALCIUM SERPL-MCNC: 9.6 MG/DL
CHLORIDE SERPL-SCNC: 102 MMOL/L
CO2 SERPL-SCNC: 31 MMOL/L
CREAT SERPL-MCNC: 1.7 MG/DL
DIFFERENTIAL METHOD: ABNORMAL
EOSINOPHIL # BLD AUTO: 0.1 K/UL
EOSINOPHIL NFR BLD: 1.1 %
ERYTHROCYTE [DISTWIDTH] IN BLOOD BY AUTOMATED COUNT: 13.3 %
EST. GFR  (AFRICAN AMERICAN): 30.6 ML/MIN/1.73 M^2
EST. GFR  (NON AFRICAN AMERICAN): 26.5 ML/MIN/1.73 M^2
GLUCOSE SERPL-MCNC: 143 MG/DL
HCT VFR BLD AUTO: 35.4 %
HGB BLD-MCNC: 11 G/DL
IMM GRANULOCYTES # BLD AUTO: 0.05 K/UL
IMM GRANULOCYTES NFR BLD AUTO: 0.7 %
LYMPHOCYTES # BLD AUTO: 2.3 K/UL
LYMPHOCYTES NFR BLD: 29.7 %
MCH RBC QN AUTO: 32.4 PG
MCHC RBC AUTO-ENTMCNC: 31.1 G/DL
MCV RBC AUTO: 104 FL
MONOCYTES # BLD AUTO: 0.9 K/UL
MONOCYTES NFR BLD: 12.4 %
NEUTROPHILS # BLD AUTO: 4.2 K/UL
NEUTROPHILS NFR BLD: 55.6 %
NRBC BLD-RTO: 0 /100 WBC
PHOSPHATE SERPL-MCNC: 3.4 MG/DL
PLATELET # BLD AUTO: 200 K/UL
PMV BLD AUTO: 11.6 FL
POTASSIUM SERPL-SCNC: 3.9 MMOL/L
RBC # BLD AUTO: 3.39 M/UL
SODIUM SERPL-SCNC: 142 MMOL/L
WBC # BLD AUTO: 7.57 K/UL

## 2019-01-14 PROCEDURE — 85025 COMPLETE CBC W/AUTO DIFF WBC: CPT

## 2019-01-14 PROCEDURE — 80069 RENAL FUNCTION PANEL: CPT

## 2019-01-14 PROCEDURE — 36415 COLL VENOUS BLD VENIPUNCTURE: CPT | Mod: PO

## 2019-01-17 ENCOUNTER — CLINICAL SUPPORT (OUTPATIENT)
Dept: CARDIOLOGY | Facility: CLINIC | Age: 84
End: 2019-01-17
Attending: INTERNAL MEDICINE
Payer: MEDICARE

## 2019-01-17 DIAGNOSIS — Z95.818 STATUS POST PLACEMENT OF IMPLANTABLE LOOP RECORDER: ICD-10-CM

## 2019-01-17 DIAGNOSIS — R55 RECURRENT SYNCOPE: ICD-10-CM

## 2019-01-17 PROCEDURE — 93299 CARDIAC DEVICE CHECK - REMOTE: ICD-10-PCS | Mod: S$GLB,,, | Performed by: INTERNAL MEDICINE

## 2019-01-17 PROCEDURE — 93298 CARDIAC DEVICE CHECK - REMOTE: ICD-10-PCS | Mod: S$GLB,,, | Performed by: INTERNAL MEDICINE

## 2019-01-17 PROCEDURE — 93298 REM INTERROG DEV EVAL SCRMS: CPT | Mod: S$GLB,,, | Performed by: INTERNAL MEDICINE

## 2019-01-17 PROCEDURE — 93299 CARDIAC DEVICE CHECK - REMOTE: CPT | Mod: S$GLB,,, | Performed by: INTERNAL MEDICINE

## 2019-01-23 ENCOUNTER — TELEPHONE (OUTPATIENT)
Dept: ADMINISTRATIVE | Facility: CLINIC | Age: 84
End: 2019-01-23

## 2019-01-23 NOTE — TELEPHONE ENCOUNTER
Home Health SOC 12/02/2018 - 01/30/2019 with [facility] - Dr. Eli Emdonds. Patient received SN and PT services.

## 2019-02-01 ENCOUNTER — TELEPHONE (OUTPATIENT)
Dept: FAMILY MEDICINE | Facility: CLINIC | Age: 84
End: 2019-02-01

## 2019-02-01 NOTE — TELEPHONE ENCOUNTER
----- Message from Venancio Martinez sent at 2/1/2019 12:19 PM CST -----  Contact: pt  Type:  RX Refill Request    Who Called:  pt  Refill or New Rx:  Refill  RX Name and Strength:  predniSONE (DELTASONE) 20 MG tablet  How is the patient currently taking it? (ex. 1XDay):  1 x day  Is this a 30 day or 90 day RX:  10 tablets  Preferred Pharmacy with phone number:   [id number] - [place] - [address]  [place] [address]  Phone: [phone] Fax: [phone]    Local or Mail Order:    Ordering Provider:    Best Call Back Number:  [phone]  Additional Information:

## 2019-02-06 NOTE — PROGRESS NOTES
[facility] Hematology/Oncology  PROGRESS NOTE        Subjective:       Patient ID:   NAME: Blaire Cage : 1930     88 y.o. female    Referring Doc: Sandro  Other Physicians: Cande Garg (PA); Eli Edmonds (PCP); Jeffrey Christopher (Pulm); Sandra (GI)    Chief Complaint:  DVT and PE f/u    History of Present Illness:     Patient returns today for a  regularly scheduled follow-up visit.  The patient is here today to go over the results of the recently ordered labs, tests and studies. She is hereby hereslf. She is breathing ok. She denies any swelling in the legs. She denies any CP, SOB, HA's or N/V. She was hospitalized about 2 months ago but has since been doing good with no new issues. For some reason the lab only did a CBc and did not draw an iron panel or CMP. She did not see GI.             ROS:   GEN: normal without any fever, night sweats or weight loss  HEENT: normal with no HA's, sore throat, stiff neck, changes in vision  CV: normal with no CP, SOB, PND, WEST or orthopnea  PULM: normal with no SOB, cough, hemoptysis, sputum or pleuritic pain  GI: normal with no abdominal pain, nausea, vomiting, constipation, diarrhea, melanotic stools, BRBPR, or hematemesis  : normal with no hematuria, dysuria  BREAST: normal with no mass, discharge, pain  SKIN: normal with no rash, erythema, bruising, or swelling    Allergies:  Review of patient's allergies indicates:   Allergen Reactions    Carvedilol Other (See Comments)     Bradycardia and syncope    Boniva [ibandronate]     Codeine     Hydralazine analogues     Iodinated contrast- oral and iv dye Hives    Kionex [sodium polystyrene sulfonate] Diarrhea     From encounter 17 for diarrhea--not true allergy.    Lisinopril     Morphine        Medications:    Current Outpatient Medications:     acetaminophen (TYLENOL) 325 MG tablet, Take 2 tablets (650 mg total) by mouth every 4 (four) hours as needed., Disp: , Rfl: 0    albuterol-ipratropium (DUO-NEB) 2.5  mg-0.5 mg/3 mL nebulizer solution, USE ONE VIAL IN NEBULIZER EVERY 6 HOURS WHILE AWAKE, Disp: 120 vial, Rfl: 5    amLODIPine (NORVASC) 10 MG tablet, TAKE ONE TABLET BY MOUTH ONCE DAILY, Disp: 90 tablet, Rfl: 3    ascorbic acid (VITAMIN C) 500 MG tablet, Take 500 mg by mouth once daily.  , Disp: , Rfl:     aspirin (ECOTRIN) 81 MG EC tablet, Take 81 mg by mouth once daily.  , Disp: , Rfl:     calcium carbonate (OS-TASIA) 500 mg calcium (1,250 mg) tablet, Take 1 tablet by mouth 2 (two) times daily.  , Disp: , Rfl:     doxycycline (VIBRAMYCIN) 100 MG Cap, Take 1 capsule (100 mg total) by mouth every 12 (twelve) hours., Disp: 20 capsule, Rfl: 0    ELIQUIS 5 mg Tab, TAKE 1 TABLET BY MOUTH TWICE DAILY, Disp: 60 tablet, Rfl: 5    ferrous sulfate (FEOSOL) 325 mg (65 mg iron) Tab tablet, Take 1 tablet (325 mg total) by mouth 2 (two) times daily with meals., Disp: 180 tablet, Rfl: 3    fish oil-omega-3 fatty acids 300-1,000 mg capsule, Take 2 g by mouth every evening. , Disp: , Rfl:     fluticasone (FLONASE) 50 mcg/actuation nasal spray, 2 sprays by Each Nare route once daily., Disp: 48 g, Rfl: 3    fluticasone-salmeterol 232-14 mcg/actuation AePB, Inhale 1 puff into the lungs 2 (two) times daily., Disp: 1 each, Rfl: 11    folic acid-vit B6-vit B12 2.5-25-2 mg (FOLBIC OR EQUIV) 2.5-25-2 mg Tab, Take 1 tablet by mouth once daily., Disp: 30 tablet, Rfl: 6    gabapentin (NEURONTIN) 300 MG capsule, TAKE ONE CAPSULE BY MOUTH IN THE EVENING, Disp: 90 capsule, Rfl: 3    magnesium oxide (MAG-OX) 400 mg tablet, TAKE ONE TABLET BY MOUTH ONCE DAILY, Disp: 90 tablet, Rfl: 3    montelukast (SINGULAIR) 10 mg tablet, Take 1 tablet (10 mg total) by mouth every evening., Disp: 90 tablet, Rfl: 3    multivitamin (THERAGRAN) tablet, Take 1 tablet by mouth once daily., Disp: , Rfl:     nitroGLYCERIN (NITROSTAT) 0.4 MG SL tablet, Place 1 tablet (0.4 mg total) under the tongue every 5 (five) minutes as needed for Chest pain. As needed  (Patient taking differently: Place 0.4 mg under the tongue every 5 (five) minutes as needed for Chest pain. Seek medical help if pain is not relieved after the third dose.), Disp: 30 tablet, Rfl: 3    polyethylene glycol (GLYCOLAX) 17 gram PwPk, Take 17 g by mouth 2 (two) times daily., Disp: 60 packet, Rfl: 0    predniSONE (DELTASONE) 20 MG tablet, Take one daily for 3 days then repeat for bronchitis, Disp: 15 tablet, Rfl: 0    sertraline (ZOLOFT) 25 MG tablet, Take 1 tablet (25 mg total) by mouth once daily., Disp: 30 tablet, Rfl: 0    simvastatin (ZOCOR) 40 MG tablet, TAKE ONE TABLET BY MOUTH ONCE DAILY IN THE EVENING, Disp: 90 tablet, Rfl: 3    vitamin D 1000 units Tab, Take 1 tablet (1,000 Units total) by mouth once daily., Disp: 90 tablet, Rfl: 3    furosemide (LASIX) 40 MG tablet, Take 0.5 tablets (20 mg total) by mouth every Monday and Thursday., Disp: , Rfl:     levothyroxine (SYNTHROID) 88 MCG tablet, Take 1 tablet (88 mcg total) by mouth before breakfast., Disp: 90 tablet, Rfl: 3    SITagliptin (JANUVIA) 25 MG Tab, Take 1 tablet (25 mg total) by mouth once daily., Disp: 30 tablet, Rfl: 5    PMHx/PSHx Updates:  See patient's last visit with me on 11/7/2018.  See H&P on 6/29/2018        Pathology:  Cancer Staging  No matching staging information was found for the patient.          Objective:     Vitals:  Blood pressure (!) 175/61, pulse 78, temperature 98 °F (36.7 °C), resp. rate 20, weight 60.6 kg (133 lb 9.6 oz).    Physical Examination:   GEN: no apparent distress, comfortable; AAOx3  HEAD: atraumatic and normocephalic  EYES: no pallor, no icterus, PERRLA  ENT: OMM, no pharyngeal erythema, external ears WNL; no nasal discharge; no thrush  NECK: no masses, thyroid normal, trachea midline, no LAD/LN's, supple  CV: RRR with no murmur; normal pulse; normal S1 and S2; no pedal edema  CHEST: Normal respiratory effort; CTAB; normal breath sounds; no wheeze or crackles  ABDOM: nontender and nondistended;  soft; normal bowel sounds; no rebound/guarding  MUSC/Skeletal: ROM normal; no crepitus; joints normal; no deformities or arthropathy; no longer on walker  EXTREM: no clubbing, cyanosis, inflammation or swelling  SKIN: no rashes, lesions, ulcers, petechiae or subcutaneous nodules; vitiligo   : no carter  NEURO: grossly intact; motor/sensory WNL; AAOx3; no tremors  PSYCH: normal mood, affect and behavior  LYMPH: normal cervical, supraclavicular, axillary and groin LN's             Labs:     1/14/2019    Lab Results   Component Value Date    WBC 7.57 01/14/2019    HGB 11.0 (L) 01/14/2019    HCT 35.4 (L) 01/14/2019     (H) 01/14/2019     01/14/2019 12/1/2018  BMP  Lab Results   Component Value Date     01/14/2019    K 3.9 01/14/2019     01/14/2019    CO2 31 (H) 01/14/2019    BUN 68 (H) 01/14/2019    CREATININE 1.7 (H) 01/14/2019    CALCIUM 9.6 01/14/2019    ANIONGAP 9 01/14/2019    ESTGFRAFRICA 30.6 (A) 01/14/2019    EGFRNONAA 26.5 (A) 01/14/2019             Radiology/Diagnostic Studies:    Ct Head Without Contrast    Result Date: 7/16/2018  EXAMINATION: CT HEAD WITHOUT CONTRAST CLINICAL HISTORY: Dizziness; TECHNIQUE: 5 mm noncontrast axial images were acquired through the head. COMPARISON: CT head without contrast-11/29/2014 FINDINGS: The brain is normally formed with preserved gray-white matter junction differentiation. No evidence of acute/recent major vascular territory cerebral infarction, parenchymal hemorrhage, or intra-axial mass.  There is age-appropriate cerebral volume loss with associated compensatory enlargement of the ventricular system and widening of the CSF spaces over both cerebral convexities.  There are confluent areas of periventricular white matter hypoattenuation compatible with age-appropriate chronic microvascular ischemic changes. No hydrocephalus.  No effacement of the skull-base cisterns.  No extra-axial fluid collections or blood products. The paranasal  sinuses and mastoid air cells are clear.  There is rightward bony nasal septal deviation.  The visualized orbits are unremarkable.  The bony calvarium and visualized facial bones show no acute abnormality.     No acute intracranial abnormality appreciated.  No interval detrimental change in the imaging appearance of the brain when compared to the previous study. Electronically signed by: Aubrey Davis MD Date:    07/16/2018 Time:    08:01    Radiology Report    Result Date: 7/15/2018  Ordered by an unspecified provider.      I have reviewed all available lab results and radiology reports.    Assessment/Plan:   (1) 88 y.o. female with diagnosis of a recent LLE DVT and Pulmonary emboli who has been referred by Dr Jo with [facility] for evaluation by medical hematology/oncology. She was hospitalized [facility]. She has never had any clots before. No family hx/of clots.    - factor panel, protein C and S, ATIII adequate  - antiphosphatidyl negative; LA negative  - homocysteine elevated at 20.2  - MTHFR-C heterozygous positive - discussed the genetic implications with her and her daughter  - prothrombin II negative  - she is on eliquis bid      (2) COPD/asthma; former smoker     (3) Left ventricular dysfunction     (4) HTN and hypercholesterolemia     (5) CRI - stage III     (6) Anemia - most likely a multifactorial process with iron deficiency diagnosis, anemia of chronic disorders, anemia of chronic renal  - hgb is at 11.0  - she is on iron and MVI    (7) DM and hypothyroidism            History of pulmonary embolism    Deep vein thrombosis (DVT) of proximal vein of left lower extremity, unspecified chronicity    Chronic anticoagulation    Other acute pulmonary embolism without acute cor pulmonale    Normocytic normochromic anemia    Macrocytic anemia    Iron deficiency anemia due to chronic blood loss    Anemia, chronic renal failure, stage 3 (moderate)    Anemia, chronic disease    Anemia due to multiple  mechanisms    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 Continue Folbic for the hyperhomocysteinemia  2. Continue eliquis and consider lifelong usage  3. Previously  discussed the genetic implications of the MTHFR-C in siblings and children  4. She is to f/u with GI at [facility]  5. F/u with PCP, GI etc  6. Check labs monthly for now (encouraged compliance)  RTC in 3 months  Fax note to Eli Edmonds MD; Reva Jo    Discussion:       I spent over 25 mins of time with the patient. Reviewed results of the recently ordered labs, tests and studies; made directives with regards to the results. Over half of this time was spent couseling and coordinating care.    I have explained all of the above in detail and the patient understands all of the current recommendation(s). I have answered all of their questions to the best of my ability and to their complete satisfaction.   The patient is to continue with the current management plan.            Electronically signed by Issac Alvarez MD

## 2019-02-07 ENCOUNTER — OFFICE VISIT (OUTPATIENT)
Dept: HEMATOLOGY/ONCOLOGY | Facility: CLINIC | Age: 84
End: 2019-02-07
Payer: MEDICARE

## 2019-02-07 ENCOUNTER — TELEPHONE (OUTPATIENT)
Dept: FAMILY MEDICINE | Facility: CLINIC | Age: 84
End: 2019-02-07

## 2019-02-07 VITALS
RESPIRATION RATE: 20 BRPM | TEMPERATURE: 98 F | DIASTOLIC BLOOD PRESSURE: 61 MMHG | SYSTOLIC BLOOD PRESSURE: 175 MMHG | BODY MASS INDEX: 23.67 KG/M2 | HEART RATE: 78 BPM | WEIGHT: 133.63 LBS

## 2019-02-07 DIAGNOSIS — I26.99 OTHER ACUTE PULMONARY EMBOLISM WITHOUT ACUTE COR PULMONALE: ICD-10-CM

## 2019-02-07 DIAGNOSIS — F17.200 TOBACCO DEPENDENCE: ICD-10-CM

## 2019-02-07 DIAGNOSIS — D64.89 ANEMIA DUE TO MULTIPLE MECHANISMS: ICD-10-CM

## 2019-02-07 DIAGNOSIS — D64.9 NORMOCYTIC NORMOCHROMIC ANEMIA: ICD-10-CM

## 2019-02-07 DIAGNOSIS — R79.83 HOMOCYSTEINEMIA: ICD-10-CM

## 2019-02-07 DIAGNOSIS — Z15.89 HETEROZYGOUS MTHFR MUTATION C677T: ICD-10-CM

## 2019-02-07 DIAGNOSIS — Z86.711 HISTORY OF PULMONARY EMBOLISM: Primary | ICD-10-CM

## 2019-02-07 DIAGNOSIS — D53.9 MACROCYTIC ANEMIA: ICD-10-CM

## 2019-02-07 DIAGNOSIS — D50.0 IRON DEFICIENCY ANEMIA DUE TO CHRONIC BLOOD LOSS: ICD-10-CM

## 2019-02-07 DIAGNOSIS — N18.30 ANEMIA, CHRONIC RENAL FAILURE, STAGE 3 (MODERATE): ICD-10-CM

## 2019-02-07 DIAGNOSIS — I82.4Y2 DEEP VEIN THROMBOSIS (DVT) OF PROXIMAL VEIN OF LEFT LOWER EXTREMITY, UNSPECIFIED CHRONICITY: ICD-10-CM

## 2019-02-07 DIAGNOSIS — D63.8 ANEMIA, CHRONIC DISEASE: ICD-10-CM

## 2019-02-07 DIAGNOSIS — Z79.01 CHRONIC ANTICOAGULATION: ICD-10-CM

## 2019-02-07 DIAGNOSIS — D63.1 ANEMIA, CHRONIC RENAL FAILURE, STAGE 3 (MODERATE): ICD-10-CM

## 2019-02-07 PROCEDURE — 1101F PT FALLS ASSESS-DOCD LE1/YR: CPT | Mod: ,,, | Performed by: INTERNAL MEDICINE

## 2019-02-07 PROCEDURE — 1101F PR PT FALLS ASSESS DOC 0-1 FALLS W/OUT INJ PAST YR: ICD-10-PCS | Mod: ,,, | Performed by: INTERNAL MEDICINE

## 2019-02-07 PROCEDURE — 99213 OFFICE O/P EST LOW 20 MIN: CPT | Mod: ,,, | Performed by: INTERNAL MEDICINE

## 2019-02-07 PROCEDURE — 99213 PR OFFICE/OUTPT VISIT, EST, LEVL III, 20-29 MIN: ICD-10-PCS | Mod: ,,, | Performed by: INTERNAL MEDICINE

## 2019-02-07 NOTE — LETTER
February 7, 2019      Eli Edmonds MD  [address] [facility]  [place] [address]           [facility] - Hematology Oncology  [address] [facility]  [address]  [place] [address]  Phone: [phone]  Fax: [phone]          Patient: Blaire Cage   MR Number: [id number]   YOB: 1930   Date of Visit: 2/7/2019       Dear Dr. Eli Edmonds:    Thank you for referring Blaire Cage to me for evaluation. Attached you will find relevant portions of my assessment and plan of care.    If you have questions, please do not hesitate to call me. I look forward to following Blaire Cage along with you.    Sincerely,    Issac Alvarez MD    Enclosure  CC:  No Recipients    If you would like to receive this communication electronically, please contact [email or web address] or [phone] to request more information on [employer] access.    For providers and/or their staff who would like to refer a patient to [facility], please contact us through our one-stop-shop provider referral line, [facility], at [phone].    If you feel you have received this communication in error or would no longer like to receive these types of communications, please e-mail [email or web address]

## 2019-02-07 NOTE — TELEPHONE ENCOUNTER
----- Message from Aura Treadwell sent at 2/7/2019  1:26 PM CST -----  Contact: self [phone]  Patient returned your call, please call back.  Thank you!

## 2019-02-13 ENCOUNTER — TELEPHONE (OUTPATIENT)
Dept: FAMILY MEDICINE | Facility: CLINIC | Age: 84
End: 2019-02-13

## 2019-02-13 NOTE — TELEPHONE ENCOUNTER
----- Message from Brook Davenport sent at 2/13/2019 12:43 PM CST -----  Contact: [phone]  Patient is requesting a call back from the nurse in regards to furosemide prescription.    Please call the patient upon request at phone number [phone].

## 2019-02-13 NOTE — TELEPHONE ENCOUNTER
Spoke with patient. Patient states she needs a refill of her lasix, requesting 20mg tabs instead of 40mg having to take 1/2 tablet. Request sent to central refill pool.

## 2019-02-14 NOTE — PROGRESS NOTES
Refill Authorization Note     is requesting a refill authorization.    Brief assessment and rationale for refill: DEFER: not hx prescribed by you; worsening renal function  Name and strength of medication: furosemide (LASIX) 40 MG tablet  Medication-related problems identified: Dose adjustment    Medication Therapy Plan: Adjusting to 20 mg from 40 mg to allow her to take 1 tablet instead of cutting them                   How patient will take medication: t0.5t po q monday and thursday          Comments:   BP Readings from Last 3 Encounters:   02/07/19 (!) 175/61   01/08/19 (!) 163/69   01/04/19 (!) 148/72      Lab Results   Component Value Date    CREATININE 1.7 (H) 01/14/2019    BUN 68 (H) 01/14/2019     01/14/2019    K 3.9 01/14/2019     01/14/2019    CO2 31 (H) 01/14/2019

## 2019-02-20 DIAGNOSIS — J82.83 EOSINOPHILIC ASTHMA: ICD-10-CM

## 2019-02-20 DIAGNOSIS — J20.9 COPD (CHRONIC OBSTRUCTIVE PULMONARY DISEASE) WITH ACUTE BRONCHITIS: ICD-10-CM

## 2019-02-20 DIAGNOSIS — J44.0 COPD (CHRONIC OBSTRUCTIVE PULMONARY DISEASE) WITH ACUTE BRONCHITIS: ICD-10-CM

## 2019-02-20 DIAGNOSIS — J44.89 ASTHMA-COPD OVERLAP SYNDROME: ICD-10-CM

## 2019-02-20 RX ORDER — PREDNISONE 20 MG/1
TABLET ORAL
Qty: 15 TABLET | Refills: 0 | Status: ON HOLD | OUTPATIENT
Start: 2019-02-20 | End: 2019-03-26 | Stop reason: HOSPADM

## 2019-02-20 NOTE — TELEPHONE ENCOUNTER
----- Message from Taryn Momin sent at 2/20/2019  1:13 PM CST -----  Contact: Self  Type:  RX Refill Request    Who Called:  patient  Refill or New Rx:  refill  RX Name and Strength:  predniSONE (DELTASONE) 20 MG tablet   How is the patient currently taking it? (ex. 1XDay):  3XDay  Is this a 30 day or 90 day RX:  15 tabs  Preferred Pharmacy with phone number:    [id number] [place] - [address]  [place] [address]  Phone: [phone] Fax: [phone]  Local or Mail Order:  local  Ordering Provider: Dr Ashwin Lamb Call Back Number: [phone] (home)   Additional Information: patient states that she is starting with the same thing as before

## 2019-03-07 ENCOUNTER — OFFICE VISIT (OUTPATIENT)
Dept: PODIATRY | Facility: CLINIC | Age: 84
End: 2019-03-07
Payer: MEDICARE

## 2019-03-07 VITALS — WEIGHT: 133.63 LBS | BODY MASS INDEX: 23.68 KG/M2 | HEIGHT: 63 IN

## 2019-03-07 DIAGNOSIS — B35.1 ONYCHOMYCOSIS DUE TO DERMATOPHYTE: ICD-10-CM

## 2019-03-07 DIAGNOSIS — M20.41 HAMMER TOES OF BOTH FEET: ICD-10-CM

## 2019-03-07 DIAGNOSIS — M20.42 HAMMER TOES OF BOTH FEET: ICD-10-CM

## 2019-03-07 DIAGNOSIS — M21.6X1 ACQUIRED EQUINUS DEFORMITY OF BOTH FEET: ICD-10-CM

## 2019-03-07 DIAGNOSIS — E11.42 DIABETIC POLYNEUROPATHY ASSOCIATED WITH TYPE 2 DIABETES MELLITUS: Primary | ICD-10-CM

## 2019-03-07 DIAGNOSIS — M72.2 PLANTAR FASCIITIS OF LEFT FOOT: ICD-10-CM

## 2019-03-07 DIAGNOSIS — M21.6X2 ACQUIRED EQUINUS DEFORMITY OF BOTH FEET: ICD-10-CM

## 2019-03-07 DIAGNOSIS — E11.51 TYPE II DIABETES MELLITUS WITH PERIPHERAL CIRCULATORY DISORDER: ICD-10-CM

## 2019-03-07 PROCEDURE — 1101F PT FALLS ASSESS-DOCD LE1/YR: CPT | Mod: CPTII,S$GLB,, | Performed by: PODIATRIST

## 2019-03-07 PROCEDURE — 99213 OFFICE O/P EST LOW 20 MIN: CPT | Mod: 25,S$GLB,, | Performed by: PODIATRIST

## 2019-03-07 PROCEDURE — 99999 PR PBB SHADOW E&M-EST. PATIENT-LVL III: ICD-10-PCS | Mod: PBBFAC,,, | Performed by: PODIATRIST

## 2019-03-07 PROCEDURE — 11721 PR DEBRIDEMENT OF NAILS, 6 OR MORE: ICD-10-PCS | Mod: Q9,S$GLB,, | Performed by: PODIATRIST

## 2019-03-07 PROCEDURE — 99999 PR PBB SHADOW E&M-EST. PATIENT-LVL III: CPT | Mod: PBBFAC,,, | Performed by: PODIATRIST

## 2019-03-07 PROCEDURE — 99213 PR OFFICE/OUTPT VISIT, EST, LEVL III, 20-29 MIN: ICD-10-PCS | Mod: 25,S$GLB,, | Performed by: PODIATRIST

## 2019-03-07 PROCEDURE — 11721 DEBRIDE NAIL 6 OR MORE: CPT | Mod: Q9,S$GLB,, | Performed by: PODIATRIST

## 2019-03-07 PROCEDURE — 1101F PR PT FALLS ASSESS DOC 0-1 FALLS W/OUT INJ PAST YR: ICD-10-PCS | Mod: CPTII,S$GLB,, | Performed by: PODIATRIST

## 2019-03-07 NOTE — PROGRESS NOTES
Subjective:      Patient ID: Blaire Cage is a 88 y.o. female.    Chief Complaint: Diabetic Foot Exam (routine); Heel Pain (left heel); Diabetes Mellitus (A1C 6.5 10/3/18); and Other Misc ([facility]- 1/4/19)    Blaire is a 88 y.o. female who presents to the clinic for routine evaluation and treatment of diabetic feet. Blaire has a past medical history of Anemia due to multiple mechanisms (6/29/2018), Anemia, chronic disease (6/29/2018), Anemia, chronic renal failure, stage 3 (moderate) (6/29/2018), Anticoagulant long-term use, Aortic aneurysm, CHF (congestive heart failure), COPD (chronic obstructive pulmonary disease), COPD with acute exacerbation (1/9/2015), Diabetes mellitus type II, DVT (deep venous thrombosis) (06/09/2018), Encounter for blood transfusion, 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 (8/7/2018), Hip arthritis (3/1/2016), Homocysteinemia (8/7/2018), Hyperlipidemia, Hypertension, Normocytic normochromic anemia (6/29/2018), PE (pulmonary thromboembolism) (06/09/2018), Pneumonia of right lower lobe due to infectious organism (9/11/2017), and Thyroid disease. In need of nail trimming.  Relates having occasional discomfort from toenails with wearing closed toe shoes.  Has not attempted to self treat.  Has left heel pain, has had plantar fasciitis before and it is getting worse. She is wearing a pair of Dr. Artis's shoes with little support.     PCP: Dr. Edmonds   Date Last Seen by PCP: 1/4/19      Hemoglobin A1C   Date Value Ref Range Status   10/03/2018 6.5 (H) 4.0 - 5.6 % Final     Comment:     ADA Screening Guidelines:  5.7-6.4%  Consistent with prediabetes  >or=6.5%  Consistent with diabetes  High levels of fetal hemoglobin interfere with the HbA1C  assay. Heterozygous hemoglobin variants (HbS, HgC, etc)do  not significantly interfere with this assay.   However, presence of multiple variants may affect accuracy.     06/27/2018 6.8 (H) 4.0 - 5.6 % Final     Comment:     ADA Screening  Guidelines:  5.7-6.4%  Consistent with prediabetes  >or=6.5%  Consistent with diabetes  High levels of fetal hemoglobin interfere with the HbA1C  assay. Heterozygous hemoglobin variants (HbS, HgC, etc)do  not significantly interfere with this assay.   However, presence of multiple variants may affect accuracy.     06/08/2018 6.3 (H) 4.0 - 5.6 % Final     Comment:     ADA Screening Guidelines:  5.7-6.4%  Consistent with prediabetes  >or=6.5%  Consistent with diabetes  High levels of fetal hemoglobin interfere with the HbA1C  assay. Heterozygous hemoglobin variants (HbS, HgC, etc)do  not significantly interfere with this assay.   However, presence of multiple variants may affect accuracy.             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 8/7/2018    Hip arthritis 3/1/2016    Homocysteinemia 8/7/2018    Hyperlipidemia     Hypertension     Normocytic normochromic anemia 6/29/2018    PE (pulmonary thromboembolism) 06/09/2018    Pneumonia of right lower lobe due to infectious organism 9/11/2017    Thyroid disease     hypothyroid       Past Surgical History:   Procedure Laterality Date    APPENDECTOMY      CHOLECYSTECTOMY      ESOPHAGOGASTRODUODENOSCOPY (EGD) N/A 10/25/2017    [facility]formed [facility] ENDO    FRACTURE SURGERY      right hip     HERNIA REPAIR      groin    HYSTERECTOMY      Insertion, Implantable Loop Recorder N/A 12/12/2018    Per[facility] by [facility]ok Herbert [facility] at [facility] CATH LAB    VARICOSE VEIN  SURGERY         Family History   Problem Relation Age of Onset    Hypertension Mother     Heart disease Mother     Lung disease Father     Diabetes Sister     Diabetes Brother     Kidney disease Son        Social History     Socioeconomic History    Marital status: Legally      Spouse name: None    Number of children: None    Years of education: None    Highest education level: None   Social Needs    Financial resource strain: None    Food insecurity - worry: None    Food insecurity - inability: None    Transportation needs - medical: None    Transportation needs - non-medical: None   Occupational History    None   Tobacco Use    Smoking status: Former Smoker     Packs/day: 0.35     Years: 44.00     Pack years: 15.40     Types: Cigarettes     Last attempt to quit: 4/30/2015     Years since quitting: 3.8    Smokeless tobacco: Never Used    Tobacco comment: 1/08/2015   Substance and Sexual Activity    Alcohol use: No     Alcohol/week: 0.0 oz    Drug use: No    Sexual activity: No   Other Topics Concern    None   Social History Narrative    None       Current Outpatient Medications   Medication Sig Dispense Refill    acetaminophen (TYLENOL) 325 MG tablet Take 2 tablets (650 mg total) by mouth every 4 (four) hours as needed.  0    albuterol-ipratropium (DUO-NEB) 2.5 mg-0.5 mg/3 mL nebulizer solution USE ONE VIAL IN NEBULIZER EVERY 6 HOURS WHILE AWAKE 120 vial 5    amLODIPine (NORVASC) 10 MG tablet TAKE ONE TABLET BY MOUTH ONCE DAILY 90 tablet 3    ascorbic acid (VITAMIN C) 500 MG tablet Take 500 mg by mouth once daily.        aspirin (ECOTRIN) 81 MG EC tablet Take 81 mg by mouth once daily.        calcium carbonate (OS-TASIA) 500 mg calcium (1,250 mg) tablet Take 1 tablet by mouth 2 (two) times daily.        doxycycline (VIBRAMYCIN) 100 MG Cap Take 1 capsule (100 mg total) by mouth every 12 (twelve) hours. 20 capsule 0    ELIQUIS 5 mg Tab TAKE 1 TABLET BY MOUTH TWICE DAILY 60  tablet 5    ferrous sulfate (FEOSOL) 325 mg (65 mg iron) Tab tablet Take 1 tablet (325 mg total) by mouth 2 (two) times daily with meals. 180 tablet 3    fish oil-omega-3 fatty acids 300-1,000 mg capsule Take 2 g by mouth every evening.       fluticasone (FLONASE) 50 mcg/actuation nasal spray 2 sprays by Each Nare route once daily. 48 g 3    fluticasone-salmeterol 232-14 mcg/actuation AePB Inhale 1 puff into the lungs 2 (two) times daily. 1 each 11    folic acid-vit B6-vit B12 2.5-25-2 mg (FOLBIC OR EQUIV) 2.5-25-2 mg Tab Take 1 tablet by mouth once daily. 30 tablet 6    gabapentin (NEURONTIN) 300 MG capsule TAKE ONE CAPSULE BY MOUTH IN THE EVENING 90 capsule 3    INV furosemide (LASIX) 20 MG Tab Take 1 tablet (20 mg total) by mouth every Monday and Thursday. 10 tablet 1    magnesium oxide (MAG-OX) 400 mg tablet TAKE ONE TABLET BY MOUTH ONCE DAILY 90 tablet 3    montelukast (SINGULAIR) 10 mg tablet Take 1 tablet (10 mg total) by mouth every evening. 90 tablet 3    multivitamin (THERAGRAN) tablet Take 1 tablet by mouth once daily.      nitroGLYCERIN (NITROSTAT) 0.4 MG SL tablet Place 1 tablet (0.4 mg total) under the tongue every 5 (five) minutes as needed for Chest pain. As needed (Patient taking differently: Place 0.4 mg under the tongue every 5 (five) minutes as needed for Chest pain. Seek medical help if pain is not relieved after the third dose.) 30 tablet 3    polyethylene glycol (GLYCOLAX) 17 gram PwPk Take 17 g by mouth 2 (two) times daily. 60 packet 0    predniSONE (DELTASONE) 20 MG tablet Take one daily for 3 days then repeat for bronchitis 15 tablet 0    sertraline (ZOLOFT) 25 MG tablet Take 1 tablet (25 mg total) by mouth once daily. 30 tablet 0    simvastatin (ZOCOR) 40 MG tablet TAKE ONE TABLET BY MOUTH ONCE DAILY IN THE EVENING 90 tablet 3    vitamin D 1000 units Tab Take 1 tablet (1,000 Units total) by mouth once daily. 90 tablet 3    levothyroxine (SYNTHROID) 88 MCG tablet Take 1  tablet (88 mcg total) by mouth before breakfast. 90 tablet 3    SITagliptin (JANUVIA) 25 MG Tab Take 1 tablet (25 mg total) by mouth once daily. 30 tablet 5     No current facility-administered medications for this visit.        Review of patient's allergies indicates:   Allergen Reactions    Carvedilol Other (See Comments)     Bradycardia and syncope    Boniva [ibandronate]     Codeine     Hydralazine analogues     Iodinated contrast media - iv dye Hives    Lisinopril     Morphine          Review of Systems   Constitution: Negative for chills and fever.   Cardiovascular: Negative for claudication and leg swelling.   Skin: Positive for color change and nail changes.   Musculoskeletal: Positive for arthritis, joint pain and joint swelling.   Neurological: Positive for paresthesias. Negative for numbness.   Psychiatric/Behavioral: Negative for altered mental status.           Objective:      Physical Exam   Constitutional: She is oriented to person, place, and time. She appears well-developed and well-nourished. No distress.   Cardiovascular:   Pulses:       Dorsalis pedis pulses are 2+ on the right side, and 2+ on the left side.        Posterior tibial pulses are 1+ on the right side, and 1+ on the left side.   CFT <3 seconds bilateral.  Pedal hair growth decreased bilateral.  Varicosities noted bilateral.  Mild nonpitting edema noted bilateral.  Toes are cool to touch bilateral.     Musculoskeletal: She exhibits edema. She exhibits no tenderness.   Pain on palpation plantar medial and central heel left foot. No pain with ROM or MMT. No pain with medial and lateral compression of heel.    Muscle strength 5/5 in all muscle groups bilateral.  No tenderness nor crepitation with ROM of foot/ankle joints right side.  No tenderness with palpation of right foot and ankle.  Bilateral pes planus.  Bilateral hallux abducto valgus.  Bilateral semi-rigid contracture of toes 2-5.     Neurological: She is alert and  oriented to person, place, and time. She has normal strength. A sensory deficit is present.   Protective sensation per [place] monofilament intact bilateral.    Vibratory sensation decreased bilateral.    Light touch intact bilateral.   Skin: Skin is warm, dry and intact. Lesion noted. No abrasion, no bruising, no burn, no ecchymosis, no laceration, no petechiae and no rash noted. She is not diaphoretic. No cyanosis or erythema. No pallor. Nails show no clubbing.   Xerosis of pedal skin bilateral.  Pedal skin appears thin and atrophic bilateral. Toenails x 10 appear thickened by 3 mm's, elongated by 3 mm's, and discolored with subungual debris.  Hemosiderin staining noted to bilateral lower extremity.      Examination of the skin reveals no evidence of significant maceration, rashes, open lesions, suspicious appearing nevi or other concerning lesions.              Assessment:       Encounter Diagnoses   Name Primary?    Diabetic polyneuropathy associated with type 2 diabetes mellitus Yes    Type II diabetes mellitus with peripheral circulatory disorder     Onychomycosis due to dermatophyte     Plantar fasciitis of left foot     Acquired equinus deformity of both feet     Hammer toes of both feet          Plan:       Blaire was seen today for diabetic foot exam, heel pain, diabetes mellitus and other misc.    Diagnoses and all orders for this visit:    Diabetic polyneuropathy associated with type 2 diabetes mellitus  -     DIABETIC SHOES FOR HOME USE    Type II diabetes mellitus with peripheral circulatory disorder  -     DIABETIC SHOES FOR HOME USE    Onychomycosis due to dermatophyte  -     DIABETIC SHOES FOR HOME USE    Plantar fasciitis of left foot  -     DIABETIC SHOES FOR HOME USE    Acquired equinus deformity of both feet  -     DIABETIC SHOES FOR HOME USE    Hammer toes of both feet  -     DIABETIC SHOES FOR HOME USE      I counseled the patient on her conditions, their implications and  medical management.    Advised to continue wearing diabetic shoes as they accommodate for digital deformities.      Shoe inspection. Diabetic Foot Education. Patient reminded of the importance of good nutrition and blood sugar control to help prevent podiatric complications of diabetes. Patient instructed on proper foot hygeine. We discussed wearing proper shoe gear, daily foot inspections, never walking without protective shoe gear, never putting sharp instruments to feet    With patient's permission, nails were aggressively reduced and debrided x 10 to their soft tissue attachment mechanically removing all offending nail and debris.  Patient relates relief following the procedure. She will continue to monitor the areas daily, inspect her feet, wear protective shoe gear when ambulatory, moisturizer to maintain skin integrity.    Recommended pronation control shoes like new balance 990 or brroks beast if she is not wearing her diabetic shoes. Wrote a new prescription for diabetic shoes as well. Offered her a heel injection, she declined at this time.    Return 3 months routine care    Emerson Chan DPM

## 2019-03-13 ENCOUNTER — TELEPHONE (OUTPATIENT)
Dept: PODIATRY | Facility: CLINIC | Age: 84
End: 2019-03-13

## 2019-03-13 NOTE — TELEPHONE ENCOUNTER
----- Message from Dorcas Uribe sent at 3/13/2019  2:22 PM CDT -----  Contact: Blaire  Type:  Patient Returning Call    Who Called:  Blaire  Who Left Message for Patient:  Tomasa  Does the patient know what this is regarding?:  appointment  Best Call Back Number:  [phone]  Additional Information:  na

## 2019-03-13 NOTE — TELEPHONE ENCOUNTER
Patient requesting that we call [facility] about shoharry - [phone] - Spoke with staff there, they do not have anything on the patient. Paperwork faxed to [facility] today.

## 2019-03-13 NOTE — TELEPHONE ENCOUNTER
----- Message from Carmen Phillips sent at 3/13/2019  1:30 PM CDT -----  Contact: pt  ..Type: Needs Medical Advice    Who Called: pt  Best Call Back Number:[phone]  Additional Information: Pt is requesting to speak with a nurse to get an authorization sent to [facility] # [phone] for her Orthopedic Shoes  Please call to advise  Thanks

## 2019-03-18 ENCOUNTER — CLINICAL SUPPORT (OUTPATIENT)
Dept: CARDIOLOGY | Facility: CLINIC | Age: 84
End: 2019-03-18
Attending: INTERNAL MEDICINE
Payer: MEDICARE

## 2019-03-18 DIAGNOSIS — R55 RECURRENT SYNCOPE: ICD-10-CM

## 2019-03-18 DIAGNOSIS — Z95.818 STATUS POST PLACEMENT OF IMPLANTABLE LOOP RECORDER: ICD-10-CM

## 2019-03-19 ENCOUNTER — PATIENT OUTREACH (OUTPATIENT)
Dept: ADMINISTRATIVE | Facility: HOSPITAL | Age: 84
End: 2019-03-19

## 2019-03-19 NOTE — PROGRESS NOTES
Health Maintenance Due   Topic Date Due    TETANUS VACCINE  07/21/1948    Eye Exam  10/30/2016    Lipid Panel  05/23/2018

## 2019-03-19 NOTE — LETTER
March 26, 2019    Blaire F Niles  [address] [facility] [address]             [facility]  [address]  [facility] [address]  Phone: [phone] [facility] is committed to your overall health.  To help you get the most out of each of your visits, we will review your information to make sure you are up to date on all of your recommended tests and/or procedures.       Dr. Eli Edmonds MD has found that your chart shows you may be due for a:     DIABETIC EYE EXAM     If you have had any of the above done at another facility, please bring the records or information with you so that your record at [facility] will be complete.  If you would like to schedule any of these, please contact the clinic at [phone].     If you are currently taking medication, please bring it with you to your appointment for review.     Also, if you have any type of Advanced Directives, please bring them with you to your office visit so we may scan them into your chart.     Thank You,     Your [facility] Team,   MD Cori Lee LPN Clinical Care Coordinator   [facility]   [address]  [facility] [address]   Phone [phone]   Fax [phone]

## 2019-03-22 ENCOUNTER — HOSPITAL ENCOUNTER (INPATIENT)
Facility: HOSPITAL | Age: 84
LOS: 4 days | Discharge: NURSING, REHAB OR LONG-TERM CARE | DRG: 481 | End: 2019-03-26
Attending: EMERGENCY MEDICINE | Admitting: HOSPITALIST
Payer: MEDICARE

## 2019-03-22 DIAGNOSIS — M25.552 HIP PAIN, LEFT: ICD-10-CM

## 2019-03-22 DIAGNOSIS — S72.002A CLOSED LEFT HIP FRACTURE: Primary | ICD-10-CM

## 2019-03-22 DIAGNOSIS — Z01.810 PREOP CARDIOVASCULAR EXAM: ICD-10-CM

## 2019-03-22 LAB
ANION GAP SERPL CALC-SCNC: 7 MMOL/L
BASOPHILS # BLD AUTO: 0 K/UL
BASOPHILS NFR BLD: 0.4 %
BUN SERPL-MCNC: 30 MG/DL
CALCIUM SERPL-MCNC: 10.4 MG/DL
CHLORIDE SERPL-SCNC: 105 MMOL/L
CO2 SERPL-SCNC: 30 MMOL/L
CREAT SERPL-MCNC: 1.4 MG/DL
DIFFERENTIAL METHOD: ABNORMAL
EOSINOPHIL # BLD AUTO: 0.3 K/UL
EOSINOPHIL NFR BLD: 3.6 %
ERYTHROCYTE [DISTWIDTH] IN BLOOD BY AUTOMATED COUNT: 14.4 %
EST. GFR  (AFRICAN AMERICAN): 39 ML/MIN/1.73 M^2
EST. GFR  (NON AFRICAN AMERICAN): 34 ML/MIN/1.73 M^2
GLUCOSE SERPL-MCNC: 86 MG/DL
HCT VFR BLD AUTO: 30 %
HGB BLD-MCNC: 10 G/DL
INR PPP: 1
LYMPHOCYTES # BLD AUTO: 1.5 K/UL
LYMPHOCYTES NFR BLD: 22.2 %
MCH RBC QN AUTO: 32.1 PG
MCHC RBC AUTO-ENTMCNC: 33.2 G/DL
MCV RBC AUTO: 97 FL
MONOCYTES # BLD AUTO: 0.5 K/UL
MONOCYTES NFR BLD: 7.6 %
NEUTROPHILS # BLD AUTO: 4.6 K/UL
NEUTROPHILS NFR BLD: 66.2 %
PLATELET # BLD AUTO: 165 K/UL
PMV BLD AUTO: 8.8 FL
POCT GLUCOSE: 134 MG/DL (ref 70–110)
POTASSIUM SERPL-SCNC: 4.2 MMOL/L
PROTHROMBIN TIME: 10.6 SEC
RBC # BLD AUTO: 3.11 M/UL
SODIUM SERPL-SCNC: 142 MMOL/L
WBC # BLD AUTO: 7 K/UL

## 2019-03-22 PROCEDURE — 85610 PROTHROMBIN TIME: CPT

## 2019-03-22 PROCEDURE — 80048 BASIC METABOLIC PNL TOTAL CA: CPT

## 2019-03-22 PROCEDURE — 85025 COMPLETE CBC W/AUTO DIFF WBC: CPT

## 2019-03-22 PROCEDURE — 96374 THER/PROPH/DIAG INJ IV PUSH: CPT | Mod: 59

## 2019-03-22 PROCEDURE — 25000003 PHARM REV CODE 250: Performed by: NURSE PRACTITIONER

## 2019-03-22 PROCEDURE — 12000002 HC ACUTE/MED SURGE SEMI-PRIVATE ROOM

## 2019-03-22 PROCEDURE — 83036 HEMOGLOBIN GLYCOSYLATED A1C: CPT

## 2019-03-22 PROCEDURE — 96376 TX/PRO/DX INJ SAME DRUG ADON: CPT

## 2019-03-22 PROCEDURE — 63600175 PHARM REV CODE 636 W HCPCS: Performed by: EMERGENCY MEDICINE

## 2019-03-22 PROCEDURE — 36415 COLL VENOUS BLD VENIPUNCTURE: CPT

## 2019-03-22 PROCEDURE — 63600175 PHARM REV CODE 636 W HCPCS: Performed by: HOSPITALIST

## 2019-03-22 PROCEDURE — 99223 1ST HOSP IP/OBS HIGH 75: CPT | Mod: ,,, | Performed by: ORTHOPAEDIC SURGERY

## 2019-03-22 PROCEDURE — 99223 PR INITIAL HOSPITAL CARE,LEVL III: ICD-10-PCS | Mod: ,,, | Performed by: ORTHOPAEDIC SURGERY

## 2019-03-22 PROCEDURE — 99285 EMERGENCY DEPT VISIT HI MDM: CPT | Mod: 25

## 2019-03-22 PROCEDURE — 27000221 HC OXYGEN, UP TO 24 HOURS

## 2019-03-22 PROCEDURE — 93005 ELECTROCARDIOGRAM TRACING: CPT

## 2019-03-22 PROCEDURE — 25000003 PHARM REV CODE 250: Performed by: HOSPITALIST

## 2019-03-22 PROCEDURE — 99900035 HC TECH TIME PER 15 MIN (STAT)

## 2019-03-22 RX ORDER — IPRATROPIUM BROMIDE AND ALBUTEROL SULFATE 2.5; .5 MG/3ML; MG/3ML
3 SOLUTION RESPIRATORY (INHALATION) EVERY 6 HOURS PRN
Status: DISCONTINUED | OUTPATIENT
Start: 2019-03-22 | End: 2019-03-26 | Stop reason: HOSPADM

## 2019-03-22 RX ORDER — HYDROMORPHONE HYDROCHLORIDE 2 MG/ML
0.5 INJECTION, SOLUTION INTRAMUSCULAR; INTRAVENOUS; SUBCUTANEOUS EVERY 4 HOURS PRN
Status: DISCONTINUED | OUTPATIENT
Start: 2019-03-22 | End: 2019-03-26 | Stop reason: HOSPADM

## 2019-03-22 RX ORDER — POTASSIUM CHLORIDE 20 MEQ/15ML
40 SOLUTION ORAL
Status: DISCONTINUED | OUTPATIENT
Start: 2019-03-22 | End: 2019-03-26 | Stop reason: HOSPADM

## 2019-03-22 RX ORDER — ONDANSETRON 2 MG/ML
4 INJECTION INTRAMUSCULAR; INTRAVENOUS EVERY 8 HOURS PRN
Status: DISCONTINUED | OUTPATIENT
Start: 2019-03-22 | End: 2019-03-26 | Stop reason: HOSPADM

## 2019-03-22 RX ORDER — FERROUS SULFATE 325(65) MG
325 TABLET, DELAYED RELEASE (ENTERIC COATED) ORAL 2 TIMES DAILY
Status: DISCONTINUED | OUTPATIENT
Start: 2019-03-22 | End: 2019-03-26 | Stop reason: HOSPADM

## 2019-03-22 RX ORDER — RAMELTEON 8 MG/1
8 TABLET ORAL NIGHTLY PRN
Status: DISCONTINUED | OUTPATIENT
Start: 2019-03-22 | End: 2019-03-26 | Stop reason: HOSPADM

## 2019-03-22 RX ORDER — AMOXICILLIN 250 MG
1 CAPSULE ORAL 2 TIMES DAILY
Status: DISCONTINUED | OUTPATIENT
Start: 2019-03-22 | End: 2019-03-26 | Stop reason: HOSPADM

## 2019-03-22 RX ORDER — ASCORBIC ACID 500 MG
500 TABLET ORAL DAILY
Status: DISCONTINUED | OUTPATIENT
Start: 2019-03-23 | End: 2019-03-26 | Stop reason: HOSPADM

## 2019-03-22 RX ORDER — IBUPROFEN 200 MG
16 TABLET ORAL
Status: DISCONTINUED | OUTPATIENT
Start: 2019-03-22 | End: 2019-03-26 | Stop reason: HOSPADM

## 2019-03-22 RX ORDER — FENTANYL CITRATE 50 UG/ML
25 INJECTION, SOLUTION INTRAMUSCULAR; INTRAVENOUS
Status: COMPLETED | OUTPATIENT
Start: 2019-03-22 | End: 2019-03-22

## 2019-03-22 RX ORDER — LEVOTHYROXINE SODIUM 88 UG/1
88 TABLET ORAL
Status: DISCONTINUED | OUTPATIENT
Start: 2019-03-23 | End: 2019-03-26 | Stop reason: HOSPADM

## 2019-03-22 RX ORDER — MORPHINE SULFATE 2 MG/ML
2 INJECTION, SOLUTION INTRAMUSCULAR; INTRAVENOUS EVERY 4 HOURS PRN
Status: DISCONTINUED | OUTPATIENT
Start: 2019-03-22 | End: 2019-03-22

## 2019-03-22 RX ORDER — GLUCAGON 1 MG
1 KIT INJECTION
Status: DISCONTINUED | OUTPATIENT
Start: 2019-03-22 | End: 2019-03-26 | Stop reason: HOSPADM

## 2019-03-22 RX ORDER — AMLODIPINE BESYLATE 5 MG/1
10 TABLET ORAL DAILY
Status: DISCONTINUED | OUTPATIENT
Start: 2019-03-23 | End: 2019-03-26 | Stop reason: HOSPADM

## 2019-03-22 RX ORDER — INSULIN ASPART 100 [IU]/ML
0-5 INJECTION, SOLUTION INTRAVENOUS; SUBCUTANEOUS
Status: DISCONTINUED | OUTPATIENT
Start: 2019-03-22 | End: 2019-03-26 | Stop reason: HOSPADM

## 2019-03-22 RX ORDER — NITROGLYCERIN 0.4 MG/1
0.4 TABLET SUBLINGUAL EVERY 5 MIN PRN
Status: DISCONTINUED | OUTPATIENT
Start: 2019-03-22 | End: 2019-03-26 | Stop reason: HOSPADM

## 2019-03-22 RX ORDER — IBUPROFEN 200 MG
24 TABLET ORAL
Status: DISCONTINUED | OUTPATIENT
Start: 2019-03-22 | End: 2019-03-26 | Stop reason: HOSPADM

## 2019-03-22 RX ORDER — SODIUM CHLORIDE 0.9 % (FLUSH) 0.9 %
5 SYRINGE (ML) INJECTION
Status: DISCONTINUED | OUTPATIENT
Start: 2019-03-22 | End: 2019-03-26 | Stop reason: HOSPADM

## 2019-03-22 RX ORDER — SODIUM CHLORIDE 9 MG/ML
INJECTION, SOLUTION INTRAVENOUS CONTINUOUS
Status: DISCONTINUED | OUTPATIENT
Start: 2019-03-22 | End: 2019-03-25

## 2019-03-22 RX ORDER — HYDROMORPHONE HYDROCHLORIDE 2 MG/ML
0.5 INJECTION, SOLUTION INTRAMUSCULAR; INTRAVENOUS; SUBCUTANEOUS EVERY 4 HOURS PRN
Status: DISCONTINUED | OUTPATIENT
Start: 2019-03-22 | End: 2019-03-22

## 2019-03-22 RX ORDER — LANOLIN ALCOHOL/MO/W.PET/CERES
800 CREAM (GRAM) TOPICAL
Status: DISCONTINUED | OUTPATIENT
Start: 2019-03-22 | End: 2019-03-26 | Stop reason: HOSPADM

## 2019-03-22 RX ORDER — AMLODIPINE BESYLATE 5 MG/1
10 TABLET ORAL ONCE
Status: COMPLETED | OUTPATIENT
Start: 2019-03-22 | End: 2019-03-22

## 2019-03-22 RX ORDER — FLUTICASONE PROPIONATE 50 MCG
2 SPRAY, SUSPENSION (ML) NASAL DAILY
Status: DISCONTINUED | OUTPATIENT
Start: 2019-03-23 | End: 2019-03-26 | Stop reason: HOSPADM

## 2019-03-22 RX ORDER — GABAPENTIN 300 MG/1
300 CAPSULE ORAL NIGHTLY
Status: DISCONTINUED | OUTPATIENT
Start: 2019-03-22 | End: 2019-03-26 | Stop reason: HOSPADM

## 2019-03-22 RX ORDER — HYDROCODONE BITARTRATE AND ACETAMINOPHEN 500; 5 MG/1; MG/1
TABLET ORAL
Status: DISCONTINUED | OUTPATIENT
Start: 2019-03-22 | End: 2019-03-26 | Stop reason: HOSPADM

## 2019-03-22 RX ORDER — ACETAMINOPHEN 325 MG/1
650 TABLET ORAL EVERY 4 HOURS PRN
Status: DISCONTINUED | OUTPATIENT
Start: 2019-03-22 | End: 2019-03-26 | Stop reason: HOSPADM

## 2019-03-22 RX ORDER — FLUTICASONE FUROATE AND VILANTEROL 100; 25 UG/1; UG/1
1 POWDER RESPIRATORY (INHALATION) DAILY
Status: DISCONTINUED | OUTPATIENT
Start: 2019-03-23 | End: 2019-03-26 | Stop reason: HOSPADM

## 2019-03-22 RX ADMIN — DOCUSATE SODIUM AND SENNOSIDES 1 TABLET: 8.6; 5 TABLET, FILM COATED ORAL at 09:03

## 2019-03-22 RX ADMIN — AMLODIPINE BESYLATE 10 MG: 5 TABLET ORAL at 11:03

## 2019-03-22 RX ADMIN — HYDROMORPHONE HYDROCHLORIDE 0.5 MG: 2 INJECTION, SOLUTION INTRAMUSCULAR; INTRAVENOUS; SUBCUTANEOUS at 06:03

## 2019-03-22 RX ADMIN — FENTANYL CITRATE 25 MCG: 50 INJECTION INTRAMUSCULAR; INTRAVENOUS at 02:03

## 2019-03-22 RX ADMIN — GABAPENTIN 300 MG: 300 CAPSULE ORAL at 09:03

## 2019-03-22 RX ADMIN — FENTANYL CITRATE 25 MCG: 50 INJECTION, SOLUTION INTRAMUSCULAR; INTRAVENOUS at 01:03

## 2019-03-22 RX ADMIN — FENTANYL CITRATE 25 MCG: 50 INJECTION, SOLUTION INTRAMUSCULAR; INTRAVENOUS at 04:03

## 2019-03-22 RX ADMIN — SODIUM CHLORIDE: 0.9 INJECTION, SOLUTION INTRAVENOUS at 08:03

## 2019-03-22 RX ADMIN — FERROUS SULFATE TAB EC 325 MG (65 MG FE EQUIVALENT) 325 MG: 325 (65 FE) TABLET DELAYED RESPONSE at 07:03

## 2019-03-22 NOTE — PLAN OF CARE
Problem: Adult Inpatient Plan of Care  Goal: Plan of Care Review  Outcome: Outcome(s) achieved Date Met: 03/22/19  Pt was medicated for pain this shift, c/o left leg pain.  She is aao, from home, iv flushes well. No plans for surgery this shift.  Dr. Collins LESTER MD talked to Dr. Hamlin today and he is aware of pt admission.  Pt is oriented to room and call light and denies need for anything else at this time.

## 2019-03-22 NOTE — ED NOTES
Patient and daughter have been updated on remaining lab results and plan of care. No needs or questions at this time.

## 2019-03-22 NOTE — ED NOTES
Patient and daughter have been updated on plan of care and results. No needs or questions at this time.

## 2019-03-22 NOTE — HPI
Blaire Cage is a 88 y.o. female who presents to the ED via EMS complaining of left leg pain and left hip pain s/p a fall immediately PTA. Pt's daughter states that there were no witnesses to the fall. Pt explains that she has pain in her upper left leg, left hip, and left groin. She states that she has no pain on her right side. The patient denies ankle pain, knee pain, or any other symptoms at this time. PMHx includes HTN, HLD, DM, COPD, hip arthritis, PE, DVT, CHF, and an aortic aneurysm. Orthopedic SHx includes a right hip fracture surgery.  No head injury no loss of consciousness

## 2019-03-22 NOTE — CONSULTS
[facility]  Orthopedics  Consult Note    Patient Name: Blaire Cage  MRN: [id number]  Admission Date: 3/22/2019  Hospital Length of Stay: 0 days  Attending Provider: Eli Chandra MD  Primary Care Provider: Eli Edmonds MD    Patient information was obtained from patient, relative(s) and ER records.     Inpatient consult to Orthopedics  Consult performed by: Jorje Hamlin MD  Consult ordered by: Eli Chandra MD  Reason for consult: L hip fx        Subjective:     Principal Problem:Closed left hip fracture    Chief Complaint:   Chief Complaint   Patient presents with    Fall     unwitnessed fall, c/o left upper leg pain        HPI: Blaire Cage is a 88 y.o. female who presents to the ED via EMS complaining of left leg pain and left hip pain s/p a fall immediately PTA. Pt's daughter states that there were no witnesses to the fall. Pt explains that she has pain in her upper left leg, left hip, and left groin. She states that she has no pain on her right side. The patient denies ankle pain, knee pain, or any other symptoms at this time. PMHx includes HTN, HLD, DM, COPD, hip arthritis, PE, DVT, CHF, and an aortic aneurysm. Orthopedic SHx includes a right hip fracture surgery.  No head injury no loss of consciousness        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 8/7/2018    Hip arthritis 3/1/2016    Homocysteinemia 8/7/2018    Hyperlipidemia     Hypertension     Normocytic normochromic  anemia 6/29/2018    PE (pulmonary thromboembolism) 06/09/2018    Pneumonia of right lower lobe due to infectious organism 9/11/2017    Thyroid disease     hypothyroid       Past Surgical History:   Procedure Laterality Date    APPENDECTOMY      CHOLECYSTECTOMY      ESOPHAGOGASTRODUODENOSCOPY (EGD) N/A 10/25/2017    Performed by [facility] at [facility] ENDO    FRACTURE SURGERY      right hip     HERNIA REPAIR      groin    HYSTERECTOMY      Insertion, Implantable Loop Recorder N/A 12/12/2018    Performed by Ashok Herbert MD at [facility] CATH LAB    VARICOSE VEIN SURGERY         Review of patient's allergies indicates:   Allergen Reactions    Carvedilol Other (See Comments)     Bradycardia and syncope    Boniva [ibandronate]     Codeine     Hydralazine analogues     Iodinated contrast- oral and iv dye Hives    Kionex [sodium polystyrene sulfonate] Diarrhea     From encounter 12/11/17 for diarrhea--not true allergy.    Lisinopril     Morphine        Current Facility-Administered Medications   Medication    hydromorphone (PF) injection 0.5 mg     Family History     Problem Relation (Age of Onset)    Diabetes Sister, Brother    Heart disease Mother    Hypertension Mother    Kidney disease Son    Lung disease Father        Tobacco Use    Smoking status: Former Smoker     Packs/day: 0.35     Years: 44.00     Pack years: 15.40     Types: Cigarettes     Last attempt to quit: 4/30/2015     Years since quitting: 3.8    Smokeless tobacco: Never Used    Tobacco comment: 1/08/2015   Substance and Sexual Activity    Alcohol use: No     Alcohol/week: 0.0 oz    Drug use: No    Sexual activity: No     Review of Systems   Unable to perform ROS  Hematologic/Lymphatic: Bruises/bleeds easily.   Musculoskeletal: Positive for falls and joint pain.     Objective:     Vital Signs (Most Recent):  Temp: 99.4 °F (37.4 °C) (03/22/19 1655)  Pulse: 72 (03/22/19 1655)  Resp: 20 (03/22/19 1655)  BP: 118/77 (03/22/19  "1655)  SpO2: (!) 94 % (03/22/19 1655) Vital Signs (24h Range):  Temp:  [98.8 °F (37.1 °C)-99.4 °F (37.4 °C)] 99.4 °F (37.4 °C)  Pulse:  [66-74] 72  Resp:  [18-20] 20  SpO2:  [93 %-98 %] 94 %  BP: (118-202)/(74-89) 118/77     Weight: 63 kg (138 lb 14.2 oz)  Height: 5' 3" (160 cm)  Body mass index is 24.6 kg/m².    No intake or output data in the 24 hours ending 03/22/19 1851    General    Nursing note and vitals reviewed.  Constitutional: She is oriented to person, place, and time. She appears well-developed and well-nourished. No distress.   HENT:   Head: Atraumatic.   Eyes: EOM are normal.   Cardiovascular: Normal rate.    Pulmonary/Chest: Effort normal.   Abdominal: Soft.   Neurological: She is alert and oriented to person, place, and time.   Psychiatric: Her behavior is normal.             Right Hip Exam   Right hip exam is normal.   Left Hip Exam     Inspection   Swelling: present  Erythema: absent    Tenderness   The patient tender to palpation of the trochanteric bursa.    Range of Motion   Extension: abnormal   Flexion: abnormal     Tests   Log Roll: positive    Other   Sensation: normal          Vascular Exam       Left Pulses  Dorsalis Pedis:      2+              Significant Labs:   CBC:   Recent Labs   Lab 03/22/19  1533   WBC 7.00   HGB 10.0*   HCT 30.0*        CMP:   Recent Labs   Lab 03/22/19  1532      K 4.2      CO2 30*   GLU 86   BUN 30*   CREATININE 1.4   CALCIUM 10.4   ANIONGAP 7*   EGFRNONAA 34*     Coagulation:   Recent Labs   Lab 03/22/19  1532   LABPROT 10.6   INR 1.0     All pertinent labs within the past 24 hours have been reviewed.    Significant Imaging: X-Ray: I have reviewed all pertinent results/findings and my personal findings are:  L hip: impacted minimally displaced left femoral neck fx. Stable R AVA in place without complications    Assessment/Plan:     * Closed left hip fracture    Plan is for CRPP. Discussed with her and family. Plan is for surgery in am.  Hold " blood thinners.  Pain control         Thank you for your consult. I will follow-up with patient. Please contact us if you have any additional questions.    Jorje Hamlin MD  Orthopedics  [facility]

## 2019-03-22 NOTE — ED PROVIDER NOTES
Encounter Date: 3/22/2019    SCRIBE #1 NOTE: Page WOOD, am scribing for, and in the presence of, Dr. Guadalupe.       History     Chief Complaint   Patient presents with    Fall     unwitnessed fall, c/o left upper leg pain       Time seen by provider: 1:41 PM on 03/22/2019    Blaire Cage is a 88 y.o. female who presents to the ED via EMS complaining of left leg pain and left hip pain s/p a fall immediately PTA. Pt's daughter states that there were no witnesses to the fall. Pt explains that she has pain in her upper left leg, left hip, and left groin. She states that she has no pain on her right side. The patient denies ankle pain, knee pain, or any other symptoms at this time. PMHx includes HTN, HLD, DM, COPD, hip arthritis, PE, DVT, CHF, and an aortic aneurysm. Orthopedic SHx includes a right hip fracture surgery.  No head injury no loss of consciousness.      The history is provided by the patient and a relative.     Review of patient's allergies indicates:   Allergen Reactions    Carvedilol Other (See Comments)     Bradycardia and syncope    Boniva [ibandronate]     Codeine     Hydralazine analogues     Iodinated contrast- oral and iv dye Hives    Kionex [sodium polystyrene sulfonate] Diarrhea     From encounter 12/11/17 for diarrhea--not true allergy.   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 8/7/2018    Hip arthritis 3/1/2016    Homocysteinemia 8/7/2018    Hyperlipidemia      Hypertension     Normocytic normochromic anemia 6/29/2018    PE (pulmonary thromboembolism) 06/09/2018    Pneumonia of right lower lobe due to infectious organism 9/11/2017    Thyroid disease     hypothyroid     Past Surgical History:   Procedure Laterality Date    APPENDECTOMY      CHOLECYSTECTOMY      ESOPHAGOGASTRODUODENOSCOPY (EGD) N/A 10/25/2017    [facility]O    FRACTURE SURGERY      right hip     HERNIA REPAIR      groin    HYSTERECTOMY      Insertion, Implantable Loop Recorder N/A 12/12/2018    Performed by Ashok Herbert [facility] at [facility] CATH LAB    VARICOSE VEIN SURGERY       Family History   Problem Relation Age of Onset    Hypertension Mother     Heart disease Mother     Lung disease Father     Diabetes Sister     Diabetes Brother     Kidney disease Son      Social History     Tobacco Use    Smoking status: Former Smoker     Packs/day: 0.35     Years: 44.00     Pack years: 15.40     Types: Cigarettes     Last attempt to quit: 4/30/2015     Years since quitting: 3.8    Smokeless tobacco: Never Used    Tobacco comment: 1/08/2015   Substance Use Topics    Alcohol use: No     Alcohol/week: 0.0 oz    Drug use: No     Review of Systems   Constitutional: Negative for diaphoresis.   HENT: Negative for facial swelling.    Respiratory: Negative for shortness of breath.    Cardiovascular: Negative for chest pain.   Gastrointestinal: Negative for abdominal pain.   Genitourinary: Negative for flank pain.   Musculoskeletal: Positive for arthralgias and gait problem. Negative for neck pain.   Skin: Negative for wound.   Neurological: Negative for headaches.   Hematological: Does not bruise/bleed easily.   Psychiatric/Behavioral: Negative for confusion.   All other systems reviewed and are negative.      Physical Exam     Initial Vitals [03/22/19 1314]   BP Pulse Resp Temp SpO2   (!) 179/82 72 18 98.8 °F (37.1 °C) 95 %      MAP       --         Physical  Exam    Nursing note and vitals reviewed.  Constitutional: She appears well-developed and well-nourished. She appears distressed.   In mild distress due to pain.   HENT:   Head: Normocephalic and atraumatic.   Eyes: EOM are normal.   Neck: Normal range of motion. Neck supple.   Cardiovascular: Normal rate, regular rhythm and normal heart sounds. Exam reveals no gallop and no friction rub.    No murmur heard.  Pulmonary/Chest: Breath sounds normal. No respiratory distress. She has no wheezes. She has no rhonchi. She has no rales.   Abdominal: Soft. She exhibits no distension. There is no tenderness. There is no rebound.   Musculoskeletal: She exhibits tenderness.        Left hip: She exhibits decreased range of motion, decreased strength, tenderness and bony tenderness.        Legs:  Tenderness to left groin.   Neurological: She is alert and oriented to person, place, and time.   Skin: Skin is warm and dry.   Psychiatric: She has a normal mood and affect. Her behavior is normal. Judgment and thought content normal.         ED Course   Procedures  Labs Reviewed   CBC W/ AUTO DIFFERENTIAL - Abnormal; Notable for the following components:       Result Value    RBC 3.11 (*)     Hemoglobin 10.0 (*)     Hematocrit 30.0 (*)     MCH 32.1 (*)     MPV 8.8 (*)     All other components within normal limits   BASIC METABOLIC PANEL - Abnormal; Notable for the following components:    CO2 30 (*)     BUN, Bld 30 (*)     Anion Gap 7 (*)     eGFR if  39 (*)     eGFR if non  34 (*)     All other components within normal limits   PROTIME-INR     EKG Readings: (Independently Interpreted)   Rhythm: Normal Sinus Rhythm. Heart Rate: 66. Ectopy: No Ectopy. Conduction: Normal. ST Segments: Normal ST Segments. T Waves: Normal. Clinical Impression: Normal Sinus Rhythm       Imaging Results          X-Ray Chest AP Portable (Final result)  Result time 03/22/19 15:25:34    Final result by Teo Hewitt MD  (03/22/19 15:25:34)                 Impression:      Cardiomegaly, cardiac loop recorder, and right basilar atelectasis.      Electronically signed by: Teo Hewitt MD  Date:    03/22/2019  Time:    15:25             Narrative:    EXAMINATION:  XR CHEST AP PORTABLE    CLINICAL HISTORY:  Encounter for preprocedural cardiovascular examination    TECHNIQUE:  Single frontal view of the chest was performed.    COMPARISON:  09/15/2018 the heart is enlarged.  Cardiac loop recorder is present.    FINDINGS:  Atelectasis is present at the right cardiophrenic angle as before.  There is no consolidation or pleural effusion.  There is eventration left hemidiaphragm.  Surgical clips are present the right axilla.                               X-Ray Hip 2 View Left (Final result)  Result time 03/22/19 14:20:55    Final result by Teo Hewitt MD (03/22/19 14:20:55)                 Impression:      Impacted left subcapital femoral neck fracture.    Possible right inferior pubic ramus fracture.    Status post right total hip arthroplasty.    Osteopenia.      Electronically signed by: Teo Hewitt MD  Date:    03/22/2019  Time:    14:20             Narrative:    EXAMINATION:  XR HIP 2 VIEW LEFT    CLINICAL HISTORY:  Pain in left hip    TECHNIQUE:  AP view of the pelvis and frog leg lateral view of the left hip were performed.    COMPARISON:  None    FINDINGS:  There is an impacted subcapital left femoral neck fracture without significant displacement or angulation.  There has been a right total hip arthroplasty.  The bones are osteopenic.  Degenerative change of the visualized lumbar spine is present.  There is a possible fracture of the right inferior pubic ramus on the AP pelvis view.                                 Medical Decision Making:   History:   Old Medical Records: I decided to obtain old medical records.  Clinical Tests:   Lab Tests: Ordered and Reviewed  Radiological Study: Ordered and Reviewed  Medical  Tests: Ordered and Reviewed            Scribe Attestation:   Scribe #1: I performed the above scribed service and the documentation accurately describes the services I performed. I attest to the accuracy of the note.    I, Dr. Guadalupe, personally performed the services described in this documentation. All medical record entries made by the scribe were at my direction and in my presence.  I have reviewed the chart and agree that the record reflects my personal performance and is accurate and complete.5:28 PM 03/22/2019            ED Course as of Mar 22 1638   Fri Mar 22, 2019   1335 SpO2: 95 % [EF]   1335 Resp: 18 [EF]   1335 Pulse: 72 [EF]   1335 Temp src: Oral [EF]   1335 Temp: 98.8 °F (37.1 °C) [EF]   1335 BP: (!) 179/82 [EF]   1421 Case discussed with Radiology Dr. Hewitt.  Likely impacted left subcapital femur fracture.  [EF]   1423 X-Ray Hip 2 View Left [EF]   1511 Case discussed with Dr. Hamlin of orthopedics. Admit to hospital medicine  [EF]   1512 Lab contacted draw blood.  Family updated.  Orthopedics consult.  [EF]   1512 Bye catheter has been placed.  [EF]   1603 Eli segura consulted  [EF]      ED Course User Index  [EF] Tayo Guadalupe MD     Clinical Impression:       ICD-10-CM ICD-9-CM   1. Hip pain, left M25.552 719.45   2. Preop cardiovascular exam Z01.810 V72.81   3. Preop cardiovascular exam Z01.810 V72.81   4. Closed left hip fracture S72.002A 820.8         Disposition:   Disposition: Admitted       88-year-old female presents to the ER after mechanical fall.  Found to have a left hip fracture.  Orthopedics consulted.  Hospital Medicine consult.  No other injuries.                 Tayo Guadalupe MD  03/22/19 [id number]

## 2019-03-22 NOTE — NURSING
Pt to room 324 via stretcher with complaint of pain to left hip pain. She is moaning with pain and states nothing helped.  Vital signs taken.  Pt on 2liters for comfort.  She has a carter to gravity with clear yellow urine noted that was placed in ER upon arrival.  She is from home.  No open wounds noted.

## 2019-03-22 NOTE — ASSESSMENT & PLAN NOTE
Plan is for CRPP. Discussed with her and family. Plan is for surgery in am.  Hold blood thinners.  Pain control

## 2019-03-22 NOTE — HPI
"Blaire Cage is a 88 y.o. female PMHx includes HTN, HLD, DM, COPD, hip arthritis, PE, DVT, CHF, and an aortic aneurysm.  who presents to the ED via EMS complaining of left leg pain and left hip pain s/p a fall immediately PTA. Pt's daughter states that there were no witnesses to the fall. Pt explains that she has pain in her upper left leg, left hip, and left groin. She states that she has no pain on her right side. The patient denies ankle pain, knee pain, or any other symptoms at this time.  Orthopedic SHx includes a right hip fracture surgery.  Pt was in severe pain on arrival to floor but resolved with small dose of dilaudid-she was out washing car and fell. Friends found her. She denies chest pain or sob or loc and no loss of bowel/bladder control. She is oriented x 3. Daughter present at time of my exam. Pt lives by herself and says, "I am stubborn".     Evaluation in ED revealed subcapital hip fx left and mild anemia.   Impression       Impacted left subcapital femoral neck fracture.  Possible right inferior pubic ramus fracture.  Status post right total hip arthroplasty.  Osteopenia.  Electronically signed by: Teo Hewitt MD  Date: 03/22/2019         "

## 2019-03-22 NOTE — ED NOTES
Upon transfer to room 324, patient is AAOx4, no cardiac or respiratory complications. No needs or questions from patient or family at this time.

## 2019-03-22 NOTE — ED NOTES
Presents to the ER with c/o left hip, groin and leg pain that is secondary to a fall that occurred just prior to arrival. Fall was unwitnessed. Patient denies hitting head. AAOx4. + for shortening and rotation. Mucous membranes are pink and moist. Skin is warm, dry and intact. Lungs are clear bilaterally, respirations are regular and unlabored. Denies cough, congestion, rhinorrhea or SOB. BS active x4, no tenderness with palpation, abd is soft and not distended. Denies any appetite or activity change. S1S2, capillary refill is < 2 seconds. Denies dysuria, difficulty urinating, frequency, numbness, tingling or weakness. KELLIE MERRITT

## 2019-03-22 NOTE — SUBJECTIVE & OBJECTIVE
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 8/7/2018    Hip arthritis 3/1/2016    Homocysteinemia 8/7/2018    Hyperlipidemia     Hypertension     Normocytic normochromic anemia 6/29/2018    PE (pulmonary thromboembolism) 06/09/2018    Pneumonia of right lower lobe due to infectious organism 9/11/2017    Thyroid disease     hypothyroid       Past Surgical History:   Procedure Laterality Date    APPENDECTOMY      CHOLECYSTECTOMY      ESOPHAGOGASTRODUODENOSCOPY (EGD) N/A 10/25/2017    Performed by [facility]adi Flores [facility] at [facility] ENDO    FRACTURE SURGERY      right hip     HERNIA REPAIR      groin    HYSTERECTOMY      Insertion, Implantable Loop Recorder N/A 12/12/2018    Performed by Ashok Herbert MD at [facility] CATH LAB    VARICOSE VEIN SURGERY         Review of patient's allergies indicates:   Allergen Reactions    Carvedilol Other (See Comments)     Bradycardia and syncope    Boniva [ibandronate]     Codeine     Hydralazine analogues     Iodinated contrast- oral and iv dye Hives    Kionex [sodium polystyrene sulfonate] Diarrhea     From encounter 12/11/17 for diarrhea--not true allergy.    Lisinopril     Morphine        Current Facility-Administered Medications   Medication    hydromorphone (PF) injection 0.5 mg     Family History     Problem Relation (Age of Onset)    Diabetes Sister, Brother    Heart disease Mother    Hypertension Mother    Kidney disease Son    Lung disease Father        Tobacco Use    Smoking  "status: Former Smoker     Packs/day: 0.35     Years: 44.00     Pack years: 15.40     Types: Cigarettes     Last attempt to quit: 4/30/2015     Years since quitting: 3.8    Smokeless tobacco: Never Used    Tobacco comment: 1/08/2015   Substance and Sexual Activity    Alcohol use: No     Alcohol/week: 0.0 oz    Drug use: No    Sexual activity: No     Review of Systems   Unable to perform ROS  Hematologic/Lymphatic: Bruises/bleeds easily.   Musculoskeletal: Positive for falls and joint pain.     Objective:     Vital Signs (Most Recent):  Temp: 99.4 °F (37.4 °C) (03/22/19 1655)  Pulse: 72 (03/22/19 1655)  Resp: 20 (03/22/19 1655)  BP: 118/77 (03/22/19 1655)  SpO2: (!) 94 % (03/22/19 1655) Vital Signs (24h Range):  Temp:  [98.8 °F (37.1 °C)-99.4 °F (37.4 °C)] 99.4 °F (37.4 °C)  Pulse:  [66-74] 72  Resp:  [18-20] 20  SpO2:  [93 %-98 %] 94 %  BP: (118-202)/(74-89) 118/77     Weight: 63 kg (138 lb 14.2 oz)  Height: 5' 3" (160 cm)  Body mass index is 24.6 kg/m².    No intake or output data in the 24 hours ending 03/22/19 1851    General    Nursing note and vitals reviewed.  Constitutional: She is oriented to person, place, and time. She appears well-developed and well-nourished. No distress.   HENT:   Head: Atraumatic.   Eyes: EOM are normal.   Cardiovascular: Normal rate.    Pulmonary/Chest: Effort normal.   Abdominal: Soft.   Neurological: She is alert and oriented to person, place, and time.   Psychiatric: Her behavior is normal.             Right Hip Exam   Right hip exam is normal.   Left Hip Exam     Inspection   Swelling: present  Erythema: absent    Tenderness   The patient tender to palpation of the trochanteric bursa.    Range of Motion   Extension: abnormal   Flexion: abnormal     Tests   Log Roll: positive    Other   Sensation: normal          Vascular Exam       Left Pulses  Dorsalis Pedis:      2+              Significant Labs:   CBC:   Recent Labs   Lab 03/22/19  1533   WBC 7.00   HGB 10.0*   HCT 30.0* "        CMP:   Recent Labs   Lab 03/22/19  1532      K 4.2      CO2 30*   GLU 86   BUN 30*   CREATININE 1.4   CALCIUM 10.4   ANIONGAP 7*   EGFRNONAA 34*     Coagulation:   Recent Labs   Lab 03/22/19  1532   LABPROT 10.6   INR 1.0     All pertinent labs within the past 24 hours have been reviewed.    Significant Imaging: X-Ray: I have reviewed all pertinent results/findings and my personal findings are:  L hip: impacted minimally displaced left femoral neck fx. Stable R AVA in place without complications

## 2019-03-23 ENCOUNTER — ANESTHESIA EVENT (OUTPATIENT)
Dept: SURGERY | Facility: HOSPITAL | Age: 84
DRG: 481 | End: 2019-03-23
Payer: MEDICARE

## 2019-03-23 ENCOUNTER — ANESTHESIA (OUTPATIENT)
Dept: SURGERY | Facility: HOSPITAL | Age: 84
DRG: 481 | End: 2019-03-23
Payer: MEDICARE

## 2019-03-23 PROBLEM — M25.552 HIP PAIN, LEFT: Status: ACTIVE | Noted: 2019-03-23

## 2019-03-23 LAB
ABO + RH BLD: NORMAL
ANION GAP SERPL CALC-SCNC: 5 MMOL/L
BACTERIA #/AREA URNS HPF: ABNORMAL /HPF
BILIRUB UR QL STRIP: NEGATIVE
BLD GP AB SCN CELLS X3 SERPL QL: NORMAL
BLD GP AB SCN TITR SERPL: 8 {TITER}
BLOOD GROUP ANTIBODIES SERPL: NORMAL
BUN SERPL-MCNC: 29 MG/DL
CALCIUM SERPL-MCNC: 9.1 MG/DL
CHLORIDE SERPL-SCNC: 106 MMOL/L
CLARITY UR: ABNORMAL
CO2 SERPL-SCNC: 30 MMOL/L
COLOR UR: YELLOW
CREAT SERPL-MCNC: 1.2 MG/DL
DAT IGG-SP REAG RBC-IMP: NORMAL
EST. GFR  (AFRICAN AMERICAN): 47 ML/MIN/1.73 M^2
EST. GFR  (NON AFRICAN AMERICAN): 40 ML/MIN/1.73 M^2
ESTIMATED AVG GLUCOSE: 131 MG/DL
GLUCOSE SERPL-MCNC: 103 MG/DL
GLUCOSE UR QL STRIP: NEGATIVE
HBA1C MFR BLD HPLC: 6.2 %
HGB UR QL STRIP: ABNORMAL
HYALINE CASTS #/AREA URNS LPF: 0 /LPF
KETONES UR QL STRIP: NEGATIVE
LEUKOCYTE ESTERASE UR QL STRIP: ABNORMAL
MICROSCOPIC COMMENT: ABNORMAL
NITRITE UR QL STRIP: NEGATIVE
PH UR STRIP: 6 [PH] (ref 5–8)
POCT GLUCOSE: 108 MG/DL (ref 70–110)
POCT GLUCOSE: 130 MG/DL (ref 70–110)
POCT GLUCOSE: 195 MG/DL (ref 70–110)
POTASSIUM SERPL-SCNC: 4.3 MMOL/L
PROT UR QL STRIP: ABNORMAL
RBC #/AREA URNS HPF: >100 /HPF (ref 0–4)
SODIUM SERPL-SCNC: 141 MMOL/L
SP GR UR STRIP: 1.02 (ref 1–1.03)
SQUAMOUS #/AREA URNS HPF: 6 /HPF
URN SPEC COLLECT METH UR: ABNORMAL
UROBILINOGEN UR STRIP-ACNC: NEGATIVE EU/DL
WBC #/AREA URNS HPF: 35 /HPF (ref 0–5)

## 2019-03-23 PROCEDURE — 36000711: Performed by: ORTHOPAEDIC SURGERY

## 2019-03-23 PROCEDURE — 71000039 HC RECOVERY, EACH ADD'L HOUR: Performed by: ORTHOPAEDIC SURGERY

## 2019-03-23 PROCEDURE — D9220A PRA ANESTHESIA: Mod: ANES,,, | Performed by: ANESTHESIOLOGY

## 2019-03-23 PROCEDURE — 71000033 HC RECOVERY, INTIAL HOUR: Performed by: ORTHOPAEDIC SURGERY

## 2019-03-23 PROCEDURE — 36415 COLL VENOUS BLD VENIPUNCTURE: CPT

## 2019-03-23 PROCEDURE — 12000002 HC ACUTE/MED SURGE SEMI-PRIVATE ROOM

## 2019-03-23 PROCEDURE — 27235 TREAT THIGH FRACTURE: CPT | Mod: LT,,, | Performed by: ORTHOPAEDIC SURGERY

## 2019-03-23 PROCEDURE — D9220A PRA ANESTHESIA: ICD-10-PCS | Mod: ANES,,, | Performed by: ANESTHESIOLOGY

## 2019-03-23 PROCEDURE — 27200651 HC AIRWAY, LMA: Performed by: NURSE ANESTHETIST, CERTIFIED REGISTERED

## 2019-03-23 PROCEDURE — 86900 BLOOD TYPING SEROLOGIC ABO: CPT | Mod: 91

## 2019-03-23 PROCEDURE — C1769 GUIDE WIRE: HCPCS | Performed by: ORTHOPAEDIC SURGERY

## 2019-03-23 PROCEDURE — 80048 BASIC METABOLIC PNL TOTAL CA: CPT

## 2019-03-23 PROCEDURE — 25000003 PHARM REV CODE 250: Performed by: HOSPITALIST

## 2019-03-23 PROCEDURE — 86886 COOMBS TEST INDIRECT TITER: CPT

## 2019-03-23 PROCEDURE — C1713 ANCHOR/SCREW BN/BN,TIS/BN: HCPCS | Performed by: ORTHOPAEDIC SURGERY

## 2019-03-23 PROCEDURE — 27235 PR PERCUT FIX PROX/NECK FEMUR FX: ICD-10-PCS | Mod: LT,,, | Performed by: ORTHOPAEDIC SURGERY

## 2019-03-23 PROCEDURE — 86901 BLOOD TYPING SEROLOGIC RH(D): CPT | Mod: 91

## 2019-03-23 PROCEDURE — 63600175 PHARM REV CODE 636 W HCPCS: Performed by: ORTHOPAEDIC SURGERY

## 2019-03-23 PROCEDURE — 63600175 PHARM REV CODE 636 W HCPCS: Performed by: ANESTHESIOLOGY

## 2019-03-23 PROCEDURE — 37000008 HC ANESTHESIA 1ST 15 MINUTES: Performed by: ORTHOPAEDIC SURGERY

## 2019-03-23 PROCEDURE — 99900035 HC TECH TIME PER 15 MIN (STAT)

## 2019-03-23 PROCEDURE — 27201423 OPTIME MED/SURG SUP & DEVICES STERILE SUPPLY: Performed by: ORTHOPAEDIC SURGERY

## 2019-03-23 PROCEDURE — 81000 URINALYSIS NONAUTO W/SCOPE: CPT

## 2019-03-23 PROCEDURE — 86870 RBC ANTIBODY IDENTIFICATION: CPT

## 2019-03-23 PROCEDURE — 63600175 PHARM REV CODE 636 W HCPCS: Performed by: HOSPITALIST

## 2019-03-23 PROCEDURE — 37000009 HC ANESTHESIA EA ADD 15 MINS: Performed by: ORTHOPAEDIC SURGERY

## 2019-03-23 PROCEDURE — D9220A PRA ANESTHESIA: ICD-10-PCS | Mod: CRNA,,, | Performed by: NURSE ANESTHETIST, CERTIFIED REGISTERED

## 2019-03-23 PROCEDURE — 25000003 PHARM REV CODE 250: Performed by: NURSE ANESTHETIST, CERTIFIED REGISTERED

## 2019-03-23 PROCEDURE — 87086 URINE CULTURE/COLONY COUNT: CPT

## 2019-03-23 PROCEDURE — 27000221 HC OXYGEN, UP TO 24 HOURS

## 2019-03-23 PROCEDURE — 36000710: Performed by: ORTHOPAEDIC SURGERY

## 2019-03-23 PROCEDURE — 63600175 PHARM REV CODE 636 W HCPCS: Performed by: NURSE ANESTHETIST, CERTIFIED REGISTERED

## 2019-03-23 PROCEDURE — 86901 BLOOD TYPING SEROLOGIC RH(D): CPT

## 2019-03-23 PROCEDURE — 86880 COOMBS TEST DIRECT: CPT

## 2019-03-23 PROCEDURE — 86850 RBC ANTIBODY SCREEN: CPT

## 2019-03-23 PROCEDURE — D9220A PRA ANESTHESIA: Mod: CRNA,,, | Performed by: NURSE ANESTHETIST, CERTIFIED REGISTERED

## 2019-03-23 DEVICE — SCREW CANN 16MM THRD 6.5X90 SS: Type: IMPLANTABLE DEVICE | Site: HIP | Status: FUNCTIONAL

## 2019-03-23 DEVICE — SCREW CANN 16MM THRD 6.5X95 SS: Type: IMPLANTABLE DEVICE | Site: HIP | Status: FUNCTIONAL

## 2019-03-23 RX ORDER — AMOXICILLIN 250 MG
1 CAPSULE ORAL 2 TIMES DAILY
Status: DISCONTINUED | OUTPATIENT
Start: 2019-03-23 | End: 2019-03-26

## 2019-03-23 RX ORDER — METOCLOPRAMIDE HYDROCHLORIDE 5 MG/ML
10 INJECTION INTRAMUSCULAR; INTRAVENOUS EVERY 10 MIN PRN
Status: DISCONTINUED | OUTPATIENT
Start: 2019-03-23 | End: 2019-03-26 | Stop reason: HOSPADM

## 2019-03-23 RX ORDER — SODIUM CHLORIDE 0.9 % (FLUSH) 0.9 %
5 SYRINGE (ML) INJECTION
Status: DISCONTINUED | OUTPATIENT
Start: 2019-03-23 | End: 2019-03-26 | Stop reason: HOSPADM

## 2019-03-23 RX ORDER — SODIUM CHLORIDE 0.9 % (FLUSH) 0.9 %
3 SYRINGE (ML) INJECTION
Status: DISCONTINUED | OUTPATIENT
Start: 2019-03-23 | End: 2019-03-26 | Stop reason: HOSPADM

## 2019-03-23 RX ORDER — LOPERAMIDE HYDROCHLORIDE 2 MG/1
2 CAPSULE ORAL CONTINUOUS PRN
Status: DISCONTINUED | OUTPATIENT
Start: 2019-03-23 | End: 2019-03-26 | Stop reason: HOSPADM

## 2019-03-23 RX ORDER — PROPOFOL 10 MG/ML
VIAL (ML) INTRAVENOUS
Status: DISCONTINUED | OUTPATIENT
Start: 2019-03-23 | End: 2019-03-23

## 2019-03-23 RX ORDER — DEXAMETHASONE SODIUM PHOSPHATE 4 MG/ML
INJECTION, SOLUTION INTRA-ARTICULAR; INTRALESIONAL; INTRAMUSCULAR; INTRAVENOUS; SOFT TISSUE
Status: DISCONTINUED | OUTPATIENT
Start: 2019-03-23 | End: 2019-03-23

## 2019-03-23 RX ORDER — SODIUM CHLORIDE, SODIUM LACTATE, POTASSIUM CHLORIDE, CALCIUM CHLORIDE 600; 310; 30; 20 MG/100ML; MG/100ML; MG/100ML; MG/100ML
INJECTION, SOLUTION INTRAVENOUS CONTINUOUS PRN
Status: DISCONTINUED | OUTPATIENT
Start: 2019-03-23 | End: 2019-03-23

## 2019-03-23 RX ORDER — LIDOCAINE HCL/PF 100 MG/5ML
SYRINGE (ML) INTRAVENOUS
Status: DISCONTINUED | OUTPATIENT
Start: 2019-03-23 | End: 2019-03-23

## 2019-03-23 RX ORDER — HYDROMORPHONE HYDROCHLORIDE 2 MG/ML
0.2 INJECTION, SOLUTION INTRAMUSCULAR; INTRAVENOUS; SUBCUTANEOUS EVERY 5 MIN PRN
Status: COMPLETED | OUTPATIENT
Start: 2019-03-23 | End: 2019-03-23

## 2019-03-23 RX ORDER — PHENYLEPHRINE HYDROCHLORIDE 10 MG/ML
INJECTION INTRAVENOUS
Status: DISCONTINUED | OUTPATIENT
Start: 2019-03-23 | End: 2019-03-23

## 2019-03-23 RX ORDER — CEFAZOLIN SODIUM 2 G/50ML
2 SOLUTION INTRAVENOUS
Status: COMPLETED | OUTPATIENT
Start: 2019-03-23 | End: 2019-03-23

## 2019-03-23 RX ORDER — HYDROCODONE BITARTRATE AND ACETAMINOPHEN 5; 325 MG/1; MG/1
1 TABLET ORAL EVERY 4 HOURS PRN
Status: DISCONTINUED | OUTPATIENT
Start: 2019-03-23 | End: 2019-03-26 | Stop reason: HOSPADM

## 2019-03-23 RX ORDER — ETOMIDATE 2 MG/ML
INJECTION INTRAVENOUS
Status: DISCONTINUED | OUTPATIENT
Start: 2019-03-23 | End: 2019-03-23

## 2019-03-23 RX ORDER — FENTANYL CITRATE 50 UG/ML
INJECTION, SOLUTION INTRAMUSCULAR; INTRAVENOUS
Status: DISCONTINUED | OUTPATIENT
Start: 2019-03-23 | End: 2019-03-23

## 2019-03-23 RX ADMIN — ETOMIDATE 5 MG: 2 INJECTION, SOLUTION INTRAVENOUS at 10:03

## 2019-03-23 RX ADMIN — HYDROMORPHONE HYDROCHLORIDE 0.2 MG: 2 INJECTION INTRAMUSCULAR; INTRAVENOUS; SUBCUTANEOUS at 12:03

## 2019-03-23 RX ADMIN — SODIUM CHLORIDE: 0.9 INJECTION, SOLUTION INTRAVENOUS at 04:03

## 2019-03-23 RX ADMIN — LIDOCAINE HYDROCHLORIDE 75 MG: 20 INJECTION, SOLUTION INTRAVENOUS at 10:03

## 2019-03-23 RX ADMIN — FERROUS SULFATE TAB EC 325 MG (65 MG FE EQUIVALENT) 325 MG: 325 (65 FE) TABLET DELAYED RESPONSE at 09:03

## 2019-03-23 RX ADMIN — DEXAMETHASONE SODIUM PHOSPHATE 4 MG: 4 INJECTION, SOLUTION INTRAMUSCULAR; INTRAVENOUS at 10:03

## 2019-03-23 RX ADMIN — CEFAZOLIN SODIUM 2 G: 2 SOLUTION INTRAVENOUS at 10:03

## 2019-03-23 RX ADMIN — PHENYLEPHRINE HYDROCHLORIDE 100 MCG: 10 INJECTION INTRAVENOUS at 10:03

## 2019-03-23 RX ADMIN — HYDROMORPHONE HYDROCHLORIDE 0.2 MG: 2 INJECTION INTRAMUSCULAR; INTRAVENOUS; SUBCUTANEOUS at 11:03

## 2019-03-23 RX ADMIN — SODIUM CHLORIDE: 0.9 INJECTION, SOLUTION INTRAVENOUS at 02:03

## 2019-03-23 RX ADMIN — GABAPENTIN 300 MG: 300 CAPSULE ORAL at 09:03

## 2019-03-23 RX ADMIN — AMLODIPINE BESYLATE 10 MG: 5 TABLET ORAL at 05:03

## 2019-03-23 RX ADMIN — HYDROMORPHONE HYDROCHLORIDE 0.5 MG: 2 INJECTION, SOLUTION INTRAMUSCULAR; INTRAVENOUS; SUBCUTANEOUS at 07:03

## 2019-03-23 RX ADMIN — SODIUM CHLORIDE, SODIUM LACTATE, POTASSIUM CHLORIDE, AND CALCIUM CHLORIDE: .6; .31; .03; .02 INJECTION, SOLUTION INTRAVENOUS at 10:03

## 2019-03-23 RX ADMIN — FENTANYL CITRATE 25 MCG: 50 INJECTION, SOLUTION INTRAMUSCULAR; INTRAVENOUS at 10:03

## 2019-03-23 RX ADMIN — DOCUSATE SODIUM AND SENNOSIDES 1 TABLET: 8.6; 5 TABLET, FILM COATED ORAL at 09:03

## 2019-03-23 RX ADMIN — CEFAZOLIN SODIUM 2 G: 2 SOLUTION INTRAVENOUS at 02:03

## 2019-03-23 RX ADMIN — PROPOFOL 50 MG: 10 INJECTION, EMULSION INTRAVENOUS at 10:03

## 2019-03-23 RX ADMIN — ONDANSETRON 4 MG: 2 INJECTION, SOLUTION INTRAMUSCULAR; INTRAVENOUS at 10:03

## 2019-03-23 NOTE — ANESTHESIA PREPROCEDURE EVALUATION
03/23/2019  Blaire Cage is a 88 y.o., female.    Anesthesia Evaluation    I have reviewed the Patient Summary Reports.    I have reviewed the Nursing Notes.   I have reviewed the Medications.     Review of Systems  Anesthesia Hx:  Denies Family Hx of Anesthesia complications.   Denies Personal Hx of Anesthesia complications.   Cardiovascular:   Hypertension CHF ECG has been reviewed. Compensated CHF,  H/o PE, on eliquis, ASA   Pulmonary:   COPD, severe Asthma moderate Shortness of breath O2 dependent   Renal/:   Chronic Renal Disease, CRI    Musculoskeletal:   Arthritis   Spine Disorders: lumbar    Neurological:   Peripheral Neuropathy    Endocrine:   Diabetes, poorly controlled, type 2 Hypothyroidism        Physical Exam  General:  Well nourished    Airway/Jaw/Neck:  Airway Findings: Mouth Opening: Normal Tongue: Normal  General Airway Assessment: Adult  Mallampati: III  Improves to II with phonation.  TM Distance: Normal, at least 6 cm         Dental:  Dental Findings: Periodontal disease, Severe, Upper partial dentures, Lower partial dentures   Chest/Lungs:  Chest/Lungs Findings: Decreased Breath Sounds Bilateral, Expiratory Wheezes, Mild, Tachypnea     Heart/Vascular:  Heart Findings: Rate: Normal  Rhythm: Regular Rhythm  Sounds: Quiet        Mental Status:  Mental Status Findings:  Cooperative, Alert and Oriented         Anesthesia Plan  Type of Anesthesia, risks & benefits discussed:  Anesthesia Type:  general  Patient's Preference:   Intra-op Monitoring Plan: standard ASA monitors  Intra-op Monitoring Plan Comments:   Post Op Pain Control Plan:   Post Op Pain Control Plan Comments:   Induction:   IV  Beta Blocker:  Patient is not currently on a Beta-Blocker (No further documentation required).       Informed Consent: Patient understands risks and agrees with Anesthesia plan.  Questions  answered. Anesthesia consent signed with patient.  ASA Score: 4     Day of Surgery Review of History & Physical:    H&P update referred to the surgeon.         Ready For Surgery From Anesthesia Perspective.

## 2019-03-23 NOTE — ASSESSMENT & PLAN NOTE
Monitor and transfuse if he becomes hemodynamically unstable or H/H < 7/21  Pt also has DEREJE-resume po iron and vit c  Trend h/h -have type and screen   eliquis on hold -no evidence active bleed.

## 2019-03-23 NOTE — ASSESSMENT & PLAN NOTE
Chronic, stable  Hypertension Medications             amLODIPine (NORVASC) 10 MG tablet TAKE ONE TABLET BY MOUTH ONCE DAILY    nitroGLYCERIN (NITROSTAT) 0.4 MG SL tablet Place 1 tablet (0.4 mg total) under the tongue every 5 (five) minutes as needed for Chest pain. As needed      Hospital Medications             amLODIPine tablet 10 mg Starting on 3/23/2019. Take 2 tablets (10 mg total) by mouth once daily.    nitroGLYCERIN SL tablet 0.4 mg Place 1 tablet (0.4 mg total) under the tongue every 5 (five) minutes as needed for Chest pain.

## 2019-03-23 NOTE — H&P
"[facility] Hospital Medicine  History & Physical    Patient Name: Blaire Cage  MRN: [id number]  Admission Date: 3/22/2019  Attending Physician: Eli Chandra MD   Primary Care Provider: Eli Edmonds MD         Patient information was obtained from patient, relative(s), past medical records and ER records.     Subjective:     Principal Problem:Closed left hip fracture    Chief Complaint:   Chief Complaint   Patient presents with    Fall     unwitnessed fall, c/o left upper leg pain        HPI:   Blaire Cage is a 88 y.o. female PMHx includes HTN, HLD, DM, COPD, hip arthritis, PE, DVT, CHF, and an aortic aneurysm.  who presents to the ED via EMS complaining of left leg pain and left hip pain s/p a fall immediately PTA. Pt's daughter states that there were no witnesses to the fall. Pt explains that she has pain in her upper left leg, left hip, and left groin. She states that she has no pain on her right side. The patient denies ankle pain, knee pain, or any other symptoms at this time.  Orthopedic SHx includes a right hip fracture surgery.  Pt was in severe pain on arrival to floor but resolved with small dose of dilaudid-she was out washing car and fell. Friends found her. She denies chest pain or sob or loc and no loss of bowel/bladder control. She is oriented x 3. Daughter present at time of my exam. Pt lives by herself and says, "I am stubborn".     Evaluation in ED revealed subcapital hip fx left and mild anemia.   Impression       Impacted left subcapital femoral neck fracture.  Possible right inferior pubic ramus fracture.  Status post right total hip arthroplasty.  Osteopenia.  Electronically signed by: Teo Hewitt MD  Date: 03/22/2019           Past Medical History:   Diagnosis Date    Anemia due to multiple mechanisms 6/29/2018    Anemia, chronic disease 6/29/2018    Anemia, chronic renal failure, stage 3 (moderate) 6/29/2018    Anticoagulant long-term use     " aspirin    Aortic aneurysm     CHF (congestive heart failure)     COPD (chronic obstructive pulmonary disease)     COPD with acute exacerbation 1/9/2015    Diabetes mellitus type II     DVT (deep venous thrombosis) 06/09/2018    Encounter for blood transfusion     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 8/7/2018    Hip arthritis 3/1/2016    Homocysteinemia 8/7/2018    Hyperlipidemia     Hypertension     Normocytic normochromic anemia 6/29/2018    PE (pulmonary thromboembolism) 06/09/2018    Pneumonia of right lower lobe due to infectious organism 9/11/2017    Thyroid disease     hypothyroid       Past Surgical History:   Procedure Laterality Date    APPENDECTOMY      CHOLECYSTECTOMY      ESOPHAGOGASTRODUODENOSCOPY (EGD) N/A 10/25/2017    Performed [facility] at [facility] ENDO    FRACTURE SURGERY      right hip     HERNIA REPAIR      groin    HYSTERECTOMY      Insertion, Implantable Loop Recorder N/A 12/12/2018    Performed by Ashok Herbert MD at [facility] CATH LAB    VARICOSE VEIN SURGERY         Review of patient's allergies indicates:   Allergen Reactions    Carvedilol Other (See Comments)     Bradycardia and syncope    Boniva [ibandronate]     Codeine     Hydralazine analogues     Iodinated contrast- oral and iv dye Hives    Kionex [sodium polystyrene sulfonate] Diarrhea     From encounter 12/11/17 for diarrhea--not true allergy.    Lisinopril     Morphine        No current facility-administered medications on file prior to encounter.      Current Outpatient Medications on File Prior to Encounter   Medication Sig    acetaminophen (TYLENOL) 325 MG tablet Take 2 tablets (650 mg total) by mouth every 4 (four) hours as needed.    albuterol-ipratropium (DUO-NEB) 2.5 mg-0.5 mg/3 mL nebulizer solution USE ONE VIAL IN NEBULIZER EVERY 6 HOURS WHILE AWAKE    amLODIPine (NORVASC) 10 MG tablet TAKE ONE TABLET BY MOUTH ONCE DAILY    ascorbic acid (VITAMIN C) 500 MG tablet Take 500 mg by mouth  once daily.      aspirin (ECOTRIN) 81 MG EC tablet Take 81 mg by mouth once daily.      calcium carbonate (OS-TASIA) 500 mg calcium (1,250 mg) tablet Take 1 tablet by mouth 2 (two) times daily.      ELIQUIS 5 mg Tab TAKE 1 TABLET BY MOUTH TWICE DAILY    ferrous sulfate (FEOSOL) 325 mg (65 mg iron) Tab tablet Take 1 tablet (325 mg total) by mouth 2 (two) times daily with meals.    fish oil-omega-3 fatty acids 300-1,000 mg capsule Take 2 g by mouth every evening.     fluticasone (FLONASE) 50 mcg/actuation nasal spray 2 sprays by Each Nare route once daily.    fluticasone-salmeterol 232-14 mcg/actuation AePB Inhale 1 puff into the lungs 2 (two) times daily.    folic acid-vit B6-vit B12 2.5-25-2 mg (FOLBIC OR EQUIV) 2.5-25-2 mg Tab Take 1 tablet by mouth once daily.    gabapentin (NEURONTIN) 300 MG capsule TAKE ONE CAPSULE BY MOUTH IN THE EVENING    levothyroxine (SYNTHROID) 88 MCG tablet Take 1 tablet (88 mcg total) by mouth before breakfast.    magnesium oxide (MAG-OX) 400 mg tablet TAKE ONE TABLET BY MOUTH ONCE DAILY    montelukast (SINGULAIR) 10 mg tablet Take 1 tablet (10 mg total) by mouth every evening.    multivitamin (THERAGRAN) tablet Take 1 tablet by mouth once daily.    nitroGLYCERIN (NITROSTAT) 0.4 MG SL tablet Place 1 tablet (0.4 mg total) under the tongue every 5 (five) minutes as needed for Chest pain. As needed (Patient taking differently: Place 0.4 mg under the tongue every 5 (five) minutes as needed for Chest pain. Seek medical help if pain is not relieved after the third dose.)    polyethylene glycol (GLYCOLAX) 17 gram PwPk Take 17 g by mouth 2 (two) times daily.    predniSONE (DELTASONE) 20 MG tablet Take one daily for 3 days then repeat for bronchitis    simvastatin (ZOCOR) 40 MG tablet TAKE ONE TABLET BY MOUTH ONCE DAILY IN THE EVENING    SITagliptin (JANUVIA) 25 MG Tab Take 1 tablet (25 mg total) by mouth once daily.    vitamin D 1000 units Tab Take 1 tablet (1,000 Units total)  by mouth once daily.    [DISCONTINUED] doxycycline (VIBRAMYCIN) 100 MG Cap Take 1 capsule (100 mg total) by mouth every 12 (twelve) hours.    [DISCONTINUED] INV furosemide (LASIX) 20 MG Tab Take 1 tablet (20 mg total) by mouth every Monday and Thursday.    [DISCONTINUED] sertraline (ZOLOFT) 25 MG tablet Take 1 tablet (25 mg total) by mouth once daily.     Family History     Problem Relation (Age of Onset)    Diabetes Sister, Brother    Heart disease Mother    Hypertension Mother    Kidney disease Son    Lung disease Father        Tobacco Use    Smoking status: Former Smoker     Packs/day: 0.35     Years: 44.00     Pack years: 15.40     Types: Cigarettes     Last attempt to quit: 4/30/2015     Years since quitting: 3.8    Smokeless tobacco: Never Used    Tobacco comment: 1/08/2015   Substance and Sexual Activity    Alcohol use: No     Alcohol/week: 0.0 oz    Drug use: No    Sexual activity: No     Review of Systems   Constitutional: Negative for appetite change and fever.   HENT: Negative for congestion and dental problem.    Eyes: Negative for discharge and itching.   Respiratory: Positive for wheezing (occasional ). Negative for cough and chest tightness.         Wears oxygen at home    Cardiovascular: Negative for chest pain, palpitations and leg swelling.   Gastrointestinal: Negative for abdominal distention and abdominal pain.   Endocrine: Negative for cold intolerance and heat intolerance.   Genitourinary: Negative for difficulty urinating and dysuria.        Bey placed in ED    Musculoskeletal: Negative for arthralgias and back pain.        Has THR right    Skin: Negative for color change and pallor.   Allergic/Immunologic: Negative for environmental allergies and food allergies.   Neurological: Negative for light-headedness and headaches.   Hematological: Negative for adenopathy. Does not bruise/bleed easily.   Psychiatric/Behavioral: Negative for agitation and behavioral problems.     Objective:      Vital Signs (Most Recent):  Temp: 99.4 °F (37.4 °C) (03/22/19 1655)  Pulse: 72 (03/22/19 1655)  Resp: 20 (03/22/19 1655)  BP: 118/77 (03/22/19 1655)  SpO2: (!) 94 % (03/22/19 1655) Vital Signs (24h Range):  Temp:  [98.8 °F (37.1 °C)-99.4 °F (37.4 °C)] 99.4 °F (37.4 °C)  Pulse:  [66-74] 72  Resp:  [18-20] 20  SpO2:  [93 %-98 %] 94 %  BP: (118-202)/(74-89) 118/77     Weight: 63 kg (138 lb 14.2 oz)  Body mass index is 24.6 kg/m².    Physical Exam   Constitutional: She is oriented to person, place, and time. She appears well-developed and well-nourished. She appears distressed (2/2 pain -resolved with pain control ).   Thin female /appears healthy    HENT:   Head: Normocephalic and atraumatic.   Right Ear: External ear normal.   Left Ear: External ear normal.   Nose: Nose normal.   Mouth/Throat: Oropharynx is clear and moist. No oropharyngeal exudate.   Eyes: Conjunctivae and EOM are normal. Pupils are equal, round, and reactive to light. Right eye exhibits no discharge. Left eye exhibits no discharge. No scleral icterus.   Neck: Normal range of motion. Neck supple. No thyromegaly present.   Cardiovascular: Normal rate, regular rhythm, normal heart sounds and intact distal pulses. Exam reveals no gallop and no friction rub.   No murmur heard.  Pulmonary/Chest: Effort normal. No respiratory distress. She has wheezes (scattered exp wheeze ).   Abdominal: Soft. Bowel sounds are normal. She exhibits no distension and no mass. There is no tenderness. There is no rebound and no guarding.   Musculoskeletal: She exhibits deformity (left leg shortened/ttp over left upper lateral thigh ). She exhibits no edema or tenderness.   Lymphadenopathy:     She has no cervical adenopathy.   Neurological: She is alert and oriented to person, place, and time. No cranial nerve deficit. Coordination normal.   Skin: Skin is warm and dry. No rash noted. No erythema.   Psychiatric: She has a normal mood and affect. Her behavior is normal.  Judgment and thought content normal.   Nursing note and vitals reviewed.        CRANIAL NERVES     CN III, IV, VI   Pupils are equal, round, and reactive to light.  Extraocular motions are normal.        Significant Labs:   CBC:   Recent Labs   Lab 03/22/19  1533   WBC 7.00   HGB 10.0*   HCT 30.0*        CMP:   Recent Labs   Lab 03/22/19  1532      K 4.2      CO2 30*   GLU 86   BUN 30*   CREATININE 1.4   CALCIUM 10.4   ANIONGAP 7*   EGFRNONAA 34*       Significant Imaging: I have reviewed and interpreted all pertinent imaging results/findings within the past 24 hours.     EKG-no acute changes     CXR-  Impression       Cardiomegaly, cardiac loop recorder, and right basilar atelectasis.      Electronically signed by: Teo Hewitt MD  Date: 03/22/2019  Time: 15:25         Assessment/Plan:     * Closed left hip fracture    Acute-2/2 mechanical fall-admitted for pain control, traction and orthopedic eval  Discussed with Dr Boyer  Npo after mn, dilaudid prn   Traction   Hold eliquis        Diabetic neuropathy, type II diabetes mellitus      Chronic, stable -continue hs gabapentin     Diastolic CHF, chronic      Chronic-on ivf overnight -will maintain gentle hydration, trend I/O -has carter   Continue home meds      Anemia due to multiple mechanisms    Monitor and transfuse if he becomes hemodynamically unstable or H/H < 7/21  Pt also has DEREJE-resume po iron and vit c  Trend h/h -have type and screen   eliquis on hold -no evidence active bleed.          Type 2 diabetes mellitus with kidney complication, without long-term current use of insulin      DM2-hold po medications; Accucks and ISS; start long acting insulin as indicated. As follows:  Start levemir at 0.2 units/kg daily  Start humalog 0.08 units/kg with meals      Asthma-COPD overlap syndrome      Chronic, stable-on continuous home O2.   Ordered nebs prn   cxr-non-acute      Hypertension associated with diabetes    Chronic,  stable  Hypertension Medications             amLODIPine (NORVASC) 10 MG tablet TAKE ONE TABLET BY MOUTH ONCE DAILY    nitroGLYCERIN (NITROSTAT) 0.4 MG SL tablet Place 1 tablet (0.4 mg total) under the tongue every 5 (five) minutes as needed for Chest pain. As needed      Hospital Medications             amLODIPine tablet 10 mg Starting on 3/23/2019. Take 2 tablets (10 mg total) by mouth once daily.    nitroGLYCERIN SL tablet 0.4 mg Place 1 tablet (0.4 mg total) under the tongue every 5 (five) minutes as needed for Chest pain.             CKD (chronic kidney disease), stage III, eGFR 39, progressive 31       CKD (chronic kidney disease) stage 3, GFR 30-59 ml/min renal dose all meds; no nephrotoxic agents     Keep Map > 65          VTE Risk Mitigation (From admission, onward)        Ordered     Place JIMMY hose  Until discontinued      03/22/19 1851     Place sequential compression device  Until discontinued      03/22/19 1851     Reason for No Pharmacological VTE Prophylaxis  Once      03/22/19 1851     IP VTE HIGH RISK PATIENT  Once      03/22/19 1851             Eli Chandra MD  Department of Hospital Medicine   [facility]

## 2019-03-23 NOTE — PLAN OF CARE
12:10  Pt out of room, in surgery.  Unable to complete d/c assessment at this time.  Will follow up later today.       03/23/19 1210   Discharge Assessment   Assessment Type Discharge Planning Assessment

## 2019-03-23 NOTE — OP NOTE
[facility]  Orthopedic Surgery  Operative Note    SUMMARY     Date of Procedure: 3/23/2019     Procedure: Procedure(s) (LRB):  PINNING, HIP, PERCUTANEOUS (Left)       Surgeon(s) and Role:     * Jorje Hamlin MD - Primary    Assisting Surgeon: Timothy Gardner    Pre-Operative Diagnosis: Hip pain, left [M25.552]    Post-Operative Diagnosis: Post-Op Diagnosis Codes:     * Hip pain, left [M25.552]    Anesthesia: General      Description of the Findings of the Procedure: l hip pinning        Complications: No    Estimated Blood Loss (EBL): 10 mL           Implants: * No implants in log *    Specimens:   Specimen (12h ago, onward)    None                  Condition: Good    Disposition: PACU - hemodynamically stable.    Attestation: I was present and scrubbed for the entire procedure.     INDICATIONS FOR THE PROCEDURE: A 88 year-old patient sustained a fall. The patient sustained a hip fracture. The patient was medically cleared and after discussion with the family proceeded with the procedure above.     PROCEDURE IN DETAIL: Risks, benefits and alternatives of the procedure were   explained to the patient and family including, but not limited to damage to   nerves, arteries, blood vessels. Also explained the risk of nonunion, malunion,  hardware prominence, hardware failure, cut out as well as infection, DVT, PE as  well as anesthetic complications including seizure, stroke, heart attack and   death. They understood this and signed informed consent. The patient's left   hip was marked prior to coming to the Operating Room. Once a formal timeout was  done in which correct patient, procedure and op site were all correctly   identified and confirmed by the entire operating team. Then,2gm Ancef were given prior to surgical incision. General anesthesia was induced. The patient's left lower extremity was prepped and draped in normal   sterile fashion. Fluoro was brought in to make sure that we could  adequately   reduce the fracture closed which we could with traction on the fracture table.   A 3 cm incision was made over the lateral thigh and through the IT band. A guidewire was inserted right along the inferior neck and centered in the neck to the tip of the head. This was checked on AP and lateral. This was then drilled and an 95 6.5mm partially threaded screw was advanced with good bite. A second parallel screw was inserted starting proximally and hugging the posterior cortex. This was confirmed and then screw was drilled and inserted. The second screw measured 90.  A third screw was placed aiming superiorly and anteriorly to space out the screws and another 90 was placed. These were all short of the subchondral bone and confirmed not to enter the joint. Wires were removed.     After this was   done, we proceeded with closing. Wounds were irrigated with normal   saline. Deep tissue was closed using 0 Vicryl, subcutaneous tissue was closed   using 2-0 Vicryl and then skin staples. Sterile dressing was applied. They were  extubated, awakened, and was transferred from the Operating Room to the Recovery  Room in stable condition.

## 2019-03-23 NOTE — SUBJECTIVE & OBJECTIVE
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 8/7/2018    Hip arthritis 3/1/2016    Homocysteinemia 8/7/2018    Hyperlipidemia     Hypertension     Normocytic normochromic anemia 6/29/2018    PE (pulmonary thromboembolism) 06/09/2018    Pneumonia of right lower lobe due to infectious organism 9/11/2017    Thyroid disease     hypothyroid       Past Surgical History:   Procedure Laterality Date    APPENDECTOMY      CHOLECYSTECTOMY      ESOPHAGOGASTRODUODENOSCOPY (EGD) N/A 10/25/2017    Performed by Fadi lFores MD at [facility] ENDO    FRACTURE SURGERY      right hip     HERNIA REPAIR      groin    HYSTERECTOMY      Insertion, Implantable Loop Recorder N/A 12/12/2018    Performed by Ashok Herbert MD at [facility] CATH LAB    VARICOSE VEIN SURGERY         Review of patient's allergies indicates:   Allergen Reactions    Carvedilol Other (See Comments)     Bradycardia and syncope    Boniva [ibandronate]     Codeine     Hydralazine analogues     Iodinated contrast- oral and iv dye Hives    Kionex [sodium polystyrene sulfonate] Diarrhea     From encounter 12/11/17 for diarrhea--not true allergy.    Lisinopril     Morphine        No current facility-administered medications on file prior to encounter.      Current Outpatient Medications on File Prior to Encounter   Medication Sig    acetaminophen (TYLENOL) 325 MG tablet Take 2 tablets (650 mg total) by mouth every 4 (four) hours as needed.    albuterol-ipratropium (DUO-NEB) 2.5  mg-0.5 mg/3 mL nebulizer solution USE ONE VIAL IN NEBULIZER EVERY 6 HOURS WHILE AWAKE    amLODIPine (NORVASC) 10 MG tablet TAKE ONE TABLET BY MOUTH ONCE DAILY    ascorbic acid (VITAMIN C) 500 MG tablet Take 500 mg by mouth once daily.      aspirin (ECOTRIN) 81 MG EC tablet Take 81 mg by mouth once daily.      calcium carbonate (OS-TASIA) 500 mg calcium (1,250 mg) tablet Take 1 tablet by mouth 2 (two) times daily.      ELIQUIS 5 mg Tab TAKE 1 TABLET BY MOUTH TWICE DAILY    ferrous sulfate (FEOSOL) 325 mg (65 mg iron) Tab tablet Take 1 tablet (325 mg total) by mouth 2 (two) times daily with meals.    fish oil-omega-3 fatty acids 300-1,000 mg capsule Take 2 g by mouth every evening.     fluticasone (FLONASE) 50 mcg/actuation nasal spray 2 sprays by Each Nare route once daily.    fluticasone-salmeterol 232-14 mcg/actuation AePB Inhale 1 puff into the lungs 2 (two) times daily.    folic acid-vit B6-vit B12 2.5-25-2 mg (FOLBIC OR EQUIV) 2.5-25-2 mg Tab Take 1 tablet by mouth once daily.    gabapentin (NEURONTIN) 300 MG capsule TAKE ONE CAPSULE BY MOUTH IN THE EVENING    levothyroxine (SYNTHROID) 88 MCG tablet Take 1 tablet (88 mcg total) by mouth before breakfast.    magnesium oxide (MAG-OX) 400 mg tablet TAKE ONE TABLET BY MOUTH ONCE DAILY    montelukast (SINGULAIR) 10 mg tablet Take 1 tablet (10 mg total) by mouth every evening.    multivitamin (THERAGRAN) tablet Take 1 tablet by mouth once daily.    nitroGLYCERIN (NITROSTAT) 0.4 MG SL tablet Place 1 tablet (0.4 mg total) under the tongue every 5 (five) minutes as needed for Chest pain. As needed (Patient taking differently: Place 0.4 mg under the tongue every 5 (five) minutes as needed for Chest pain. Seek medical help if pain is not relieved after the third dose.)    polyethylene glycol (GLYCOLAX) 17 gram PwPk Take 17 g by mouth 2 (two) times daily.    predniSONE (DELTASONE) 20 MG tablet Take one daily for 3 days then repeat for bronchitis     simvastatin (ZOCOR) 40 MG tablet TAKE ONE TABLET BY MOUTH ONCE DAILY IN THE EVENING    SITagliptin (JANUVIA) 25 MG Tab Take 1 tablet (25 mg total) by mouth once daily.    vitamin D 1000 units Tab Take 1 tablet (1,000 Units total) by mouth once daily.    [DISCONTINUED] doxycycline (VIBRAMYCIN) 100 MG Cap Take 1 capsule (100 mg total) by mouth every 12 (twelve) hours.    [DISCONTINUED] INV furosemide (LASIX) 20 MG Tab Take 1 tablet (20 mg total) by mouth every Monday and Thursday.    [DISCONTINUED] sertraline (ZOLOFT) 25 MG tablet Take 1 tablet (25 mg total) by mouth once daily.     Family History     Problem Relation (Age of Onset)    Diabetes Sister, Brother    Heart disease Mother    Hypertension Mother    Kidney disease Son    Lung disease Father        Tobacco Use    Smoking status: Former Smoker     Packs/day: 0.35     Years: 44.00     Pack years: 15.40     Types: Cigarettes     Last attempt to quit: 4/30/2015     Years since quitting: 3.8    Smokeless tobacco: Never Used    Tobacco comment: 1/08/2015   Substance and Sexual Activity    Alcohol use: No     Alcohol/week: 0.0 oz    Drug use: No    Sexual activity: No     Review of Systems   Constitutional: Negative for appetite change and fever.   HENT: Negative for congestion and dental problem.    Eyes: Negative for discharge and itching.   Respiratory: Positive for wheezing (occasional ). Negative for cough and chest tightness.         Wears oxygen at home    Cardiovascular: Negative for chest pain, palpitations and leg swelling.   Gastrointestinal: Negative for abdominal distention and abdominal pain.   Endocrine: Negative for cold intolerance and heat intolerance.   Genitourinary: Negative for difficulty urinating and dysuria.        Bey placed in ED    Musculoskeletal: Negative for arthralgias and back pain.        Has THR right    Skin: Negative for color change and pallor.   Allergic/Immunologic: Negative for environmental allergies and food  allergies.   Neurological: Negative for light-headedness and headaches.   Hematological: Negative for adenopathy. Does not bruise/bleed easily.   Psychiatric/Behavioral: Negative for agitation and behavioral problems.     Objective:     Vital Signs (Most Recent):  Temp: 99.4 °F (37.4 °C) (03/22/19 1655)  Pulse: 72 (03/22/19 1655)  Resp: 20 (03/22/19 1655)  BP: 118/77 (03/22/19 1655)  SpO2: (!) 94 % (03/22/19 1655) Vital Signs (24h Range):  Temp:  [98.8 °F (37.1 °C)-99.4 °F (37.4 °C)] 99.4 °F (37.4 °C)  Pulse:  [66-74] 72  Resp:  [18-20] 20  SpO2:  [93 %-98 %] 94 %  BP: (118-202)/(74-89) 118/77     Weight: 63 kg (138 lb 14.2 oz)  Body mass index is 24.6 kg/m².    Physical Exam   Constitutional: She is oriented to person, place, and time. She appears well-developed and well-nourished. She appears distressed (2/2 pain -resolved with pain control ).   Thin female /appears healthy    HENT:   Head: Normocephalic and atraumatic.   Right Ear: External ear normal.   Left Ear: External ear normal.   Nose: Nose normal.   Mouth/Throat: Oropharynx is clear and moist. No oropharyngeal exudate.   Eyes: Conjunctivae and EOM are normal. Pupils are equal, round, and reactive to light. Right eye exhibits no discharge. Left eye exhibits no discharge. No scleral icterus.   Neck: Normal range of motion. Neck supple. No thyromegaly present.   Cardiovascular: Normal rate, regular rhythm, normal heart sounds and intact distal pulses. Exam reveals no gallop and no friction rub.   No murmur heard.  Pulmonary/Chest: Effort normal. No respiratory distress. She has wheezes (scattered exp wheeze ).   Abdominal: Soft. Bowel sounds are normal. She exhibits no distension and no mass. There is no tenderness. There is no rebound and no guarding.   Musculoskeletal: She exhibits deformity (left leg shortened/ttp over left upper lateral thigh ). She exhibits no edema or tenderness.   Lymphadenopathy:     She has no cervical adenopathy.   Neurological:  She is alert and oriented to person, place, and time. No cranial nerve deficit. Coordination normal.   Skin: Skin is warm and dry. No rash noted. No erythema.   Psychiatric: She has a normal mood and affect. Her behavior is normal. Judgment and thought content normal.   Nursing note and vitals reviewed.        CRANIAL NERVES     CN III, IV, VI   Pupils are equal, round, and reactive to light.  Extraocular motions are normal.        Significant Labs:   CBC:   Recent Labs   Lab 03/22/19  1533   WBC 7.00   HGB 10.0*   HCT 30.0*        CMP:   Recent Labs   Lab 03/22/19  1532      K 4.2      CO2 30*   GLU 86   BUN 30*   CREATININE 1.4   CALCIUM 10.4   ANIONGAP 7*   EGFRNONAA 34*       Significant Imaging: I have reviewed and interpreted all pertinent imaging results/findings within the past 24 hours.     EKG-no acute changes     CXR-  Impression       Cardiomegaly, cardiac loop recorder, and right basilar atelectasis.      Electronically signed by: Teo Hewitt MD  Date: 03/22/2019  Time: 15:25

## 2019-03-23 NOTE — NURSING
"Situation Principle Problem:  Closed left hip fracture      Reason for Calling:  Pt on home Amlodipine 10mg. Pt normally takes in evening, but didn't have dose today. /74 can I give her, her dose for today then it be rescheduled to pt normal schedule of every evening.   Provider Calling: John Paul    Background Vitals:    03/22/19 1633 03/22/19 1655 03/22/19 1657 03/22/19 2015   BP:  118/77  (!) 170/74   Patient Position:    Lying   Pulse:  72  77   Resp:  20  18   Temp:  99.4 °F (37.4 °C)  99 °F (37.2 °C)   TempSrc:    Oral   SpO2: 96% (!) 94%  97%   Weight:   63 kg (138 lb 14.2 oz)    Height:   5' 3" (1.6 m)        No results found for: POCTGLUCOSE    Intake/Output:  No intake or output data in the 24 hours ending 03/22/19 2023     Assessment What is happening: Elevated BP    Response Provider Response:give dose of Amlodipine now, because pt hadn't had daily dose.      "

## 2019-03-23 NOTE — PLAN OF CARE
03/22/19 2200   Patient Assessment/Suction   Level of Consciousness (AVPU) alert   Respiratory Effort Normal   Expansion/Accessory Muscles/Retractions no use of accessory muscles   All Lung Fields Breath Sounds clear   PRE-TX-O2   O2 Device (Oxygen Therapy) nasal cannula   $ Is the patient on Low Flow Oxygen? Yes   Flow (L/min) 2   SpO2 98 %   Pulse Oximetry Type Intermittent   Pulse 74   Resp 16   Aerosol Therapy   $ Aerosol Therapy Charges PRN treatment not required

## 2019-03-23 NOTE — ASSESSMENT & PLAN NOTE
DM2-hold po medications; Accucks and ISS; start long acting insulin as indicated. As follows:  Start levemir at 0.2 units/kg daily  Start humalog 0.08 units/kg with meals

## 2019-03-23 NOTE — ASSESSMENT & PLAN NOTE
Monitor and transfuse if he becomes hemodynamically unstable or H/H < 7/21  Pt also has DEREJE-resume po iron and vit c  Trend h/h -have type and screen   eliquis on hold -no evidence active bleed.   Plan to restart eliquis tomorrow

## 2019-03-23 NOTE — ANESTHESIA POSTPROCEDURE EVALUATION
"Anesthesia Post Evaluation    Patient: Blaire WISDOM Niles    Procedure(s) Performed: Procedure(s) (LRB):  PINNING, HIP, PERCUTANEOUS (Left)    Final Anesthesia Type: general  Patient location during evaluation: PACU  Patient participation: Yes- Able to Participate  Level of consciousness: awake and alert  Post-procedure vital signs: reviewed and stable  Pain management: adequate  Airway patency: patent  PONV status at discharge: No PONV  Anesthetic complications: no      Cardiovascular status: blood pressure returned to baseline  Respiratory status: unassisted and nasal cannula  Hydration status: euvolemic  Follow-up not needed.        Visit Vitals  BP (!) 132/53   Pulse 68   Temp 37.2 °C (99 °F) (Skin)   Resp 12   Ht 5' 3" (1.6 m)   Wt 63 kg (138 lb 14.2 oz)   LMP  (LMP Unknown)   SpO2 95%   Breastfeeding? No   BMI 24.60 kg/m²       Pain/Mandeep Score: Pain Rating Prior to Med Admin: 5 (3/23/2019 12:36 PM)  Pain Rating Post Med Admin: 0 (3/23/2019  7:45 AM)  Mandeep Score: 9 (3/23/2019 12:35 PM)        "

## 2019-03-23 NOTE — PROGRESS NOTES
"[facility] Medicine  Progress Note    Patient Name: Blaire Cage  MRN: [id number]  Patient Class: IP- Inpatient   Admission Date: 3/22/2019  Length of Stay: 1 days  Attending Physician: Tony Bender MD  Primary Care Provider: Eli Edmonds MD        Subjective:     Principal Problem:Closed left hip fracture    HPI:  Blaire Cage is a 88 y.o. female PMHx includes HTN, HLD, DM, COPD, hip arthritis, PE, DVT, CHF, and an aortic aneurysm.  who presents to the ED via EMS complaining of left leg pain and left hip pain s/p a fall immediately PTA. Pt's daughter states that there were no witnesses to the fall. Pt explains that she has pain in her upper left leg, left hip, and left groin. She states that she has no pain on her right side. The patient denies ankle pain, knee pain, or any other symptoms at this time.  Orthopedic SHx includes a right hip fracture surgery.  Pt was in severe pain on arrival to floor but resolved with small dose of dilaudid-she was out washing car and fell. Friends found her. She denies chest pain or sob or loc and no loss of bowel/bladder control. She is oriented x 3. Daughter present at time of my exam. Pt lives by herself and says, "I am stubborn".     Evaluation in ED revealed subcapital hip fx left and mild anemia.   Impression       Impacted left subcapital femoral neck fracture.  Possible right inferior pubic ramus fracture.  Status post right total hip arthroplasty.  Osteopenia.  Electronically signed by: Teo Hewitt MD  Date: 03/22/2019           Hospital Course:  No notes on file    Interval History: patient seen post op.  No acute events overnight or during surgery.     Review of Systems   Unable to perform ROS: Other   Constitutional: Negative for activity change, appetite change and fever.   HENT: Negative.    Eyes: Negative.    Respiratory: Negative for chest tightness, shortness of breath and wheezing.    Cardiovascular: Negative for " chest pain, palpitations and leg swelling.   Gastrointestinal: Negative for abdominal distention, abdominal pain, blood in stool, diarrhea and vomiting.   Genitourinary: Negative for dysuria and hematuria.   Neurological: Negative for headaches.   Hematological: Negative for adenopathy.   Psychiatric/Behavioral: Negative for confusion.     Objective:     Vital Signs (Most Recent):  Temp: 98.6 °F (37 °C) (03/23/19 1110)  Pulse: 68 (03/23/19 1115)  Resp: (!) 9 (03/23/19 1115)  BP: (!) 138/94 (03/23/19 1115)  SpO2: 99 % (03/23/19 1115) Vital Signs (24h Range):  Temp:  [98.1 °F (36.7 °C)-99.4 °F (37.4 °C)] 98.6 °F (37 °C)  Pulse:  [64-77] 68  Resp:  [9-20] 9  SpO2:  [93 %-99 %] 99 %  BP: (118-202)/(59-94) 138/94     Weight: 63 kg (138 lb 14.2 oz)  Body mass index is 24.6 kg/m².    Intake/Output Summary (Last 24 hours) at 3/23/2019 1132  Last data filed at 3/23/2019 1110  Gross per 24 hour   Intake 1406.25 ml   Output 840 ml   Net 566.25 ml      Physical Exam   Constitutional: She appears well-developed and well-nourished. No distress.   Sleepy post op   HENT:   Head: Normocephalic and atraumatic.   Eyes: Pupils are equal, round, and reactive to light.   Neck: Neck supple. No thyromegaly present.   Cardiovascular: Normal rate and regular rhythm. Exam reveals no gallop and no friction rub.   No murmur heard.  Pulmonary/Chest: Effort normal and breath sounds normal. No respiratory distress. She has no wheezes.   Abdominal: Soft. Bowel sounds are normal. She exhibits no distension. There is no tenderness. There is no guarding.   Musculoskeletal: She exhibits no edema.   Bandage to surgery site   Neurological: She is alert.   Skin: Skin is warm and dry. No erythema.   Psychiatric: She has a normal mood and affect.       Significant Labs:   CBC:   Recent Labs   Lab 03/22/19  1533   WBC 7.00   HGB 10.0*   HCT 30.0*          Significant Imaging: I have reviewed all pertinent imaging results/findings within the past 24  hours.    Assessment/Plan:      * Closed left hip fracture    - s/p repair  - ok to advance diet this evening if tolerated  - pain control       Hip pain, left           Diastolic CHF, chronic      Chronic-on ivf overnight -will maintain gentle hydration, trend I/O -has carter   Continue home meds      Anemia due to multiple mechanisms    Monitor and transfuse if he becomes hemodynamically unstable or H/H < 7/21  Pt also has DEREJE-resume po iron and vit c  Trend h/h -have type and screen   eliquis on hold -no evidence active bleed.   Plan to restart eliquis tomorrow         Type 2 diabetes mellitus with kidney complication, without long-term current use of insulin      DM2-hold po medications; Accucks and ISS; start long acting insulin as indicated. As follows:  Start levemir at 0.2 units/kg daily  Start humalog 0.08 units/kg with meals      Asthma-COPD overlap syndrome      Chronic, stable-on continuous home O2.   Ordered nebs prn   cxr-non-acute      Hypertension associated with diabetes    Chronic, stable  Hypertension Medications             amLODIPine (NORVASC) 10 MG tablet TAKE ONE TABLET BY MOUTH ONCE DAILY    nitroGLYCERIN (NITROSTAT) 0.4 MG SL tablet Place 1 tablet (0.4 mg total) under the tongue every 5 (five) minutes as needed for Chest pain. As needed      Hospital Medications             amLODIPine tablet 10 mg Starting on 3/23/2019. Take 2 tablets (10 mg total) by mouth once daily.    nitroGLYCERIN SL tablet 0.4 mg Place 1 tablet (0.4 mg total) under the tongue every 5 (five) minutes as needed for Chest pain.             CKD (chronic kidney disease), stage III, eGFR 39, progressive 31       CKD (chronic kidney disease) stage 3, GFR 30-59 ml/min renal dose all meds; no nephrotoxic agents     Keep Map > 65        Diabetic neuropathy, type II diabetes mellitus      Chronic, stable -continue hs gabapentin       VTE Risk Mitigation (From admission, onward)        Ordered     Place JIMMY hose  Until discontinued       03/22/19 1851     Place sequential compression device  Until discontinued      03/22/19 1851     Reason for No Pharmacological VTE Prophylaxis  Once      03/22/19 1851     IP VTE HIGH RISK PATIENT  Once      03/22/19 1851              Tony Bender MD  Department of Hospital Medicine   [facility]

## 2019-03-23 NOTE — PLAN OF CARE
Problem: Adult Inpatient Plan of Care  Goal: Plan of Care Review  Outcome: Ongoing (interventions implemented as appropriate)  Patient aao x 4. Complains of L hip pain, controlled with prn medication. Plan of care reviewed with patient, verbalized understanding. IV fluids maintained. IV antibiotic given. Dressing to L hip dry and intact. SCDs on. Bey intact. Bed in lowest position, call light within reach and room next to nurses station. Remained free from fall and injury.

## 2019-03-23 NOTE — ASSESSMENT & PLAN NOTE
CKD (chronic kidney disease) stage 3, GFR 30-59 ml/min renal dose all meds; no nephrotoxic agents     Keep Map > 65

## 2019-03-23 NOTE — TRANSFER OF CARE
"Anesthesia Transfer of Care Note    Patient: Blaire WISDOM Niles    Procedure(s) Performed: Procedure(s) (LRB):  PINNING, HIP, PERCUTANEOUS (Left)    Patient location: PACU    Anesthesia Type: general    Transport from OR: Transported from OR on 2-3 L/min O2 by NC with adequate spontaneous ventilation    Post pain: adequate analgesia    Post assessment: no apparent anesthetic complications and tolerated procedure well    Post vital signs: stable    Level of consciousness: awake, alert and oriented    Nausea/Vomiting: no nausea/vomiting    Complications: none    Transfer of care protocol was followed      Last vitals:   Visit Vitals  BP (!) 146/79 (BP Location: Right arm, Patient Position: Lying)   Pulse 64   Temp 37.2 °C (98.9 °F) (Axillary)   Resp 16   Ht 5' 3" (1.6 m)   Wt 63 kg (138 lb 14.2 oz)   LMP  (LMP Unknown)   SpO2 96%   Breastfeeding? No   BMI 24.60 kg/m²     "

## 2019-03-23 NOTE — ASSESSMENT & PLAN NOTE
Acute-2/2 mechanical fall-admitted for pain control, traction and orthopedic eval  Discussed with Dr Merlin Stephenso after mn, dilaudid prn   Traction   Hold eliquis

## 2019-03-23 NOTE — SUBJECTIVE & OBJECTIVE
Interval History: patient seen post op.  No acute events overnight or during surgery.     Review of Systems   Unable to perform ROS: Other   Constitutional: Negative for activity change, appetite change and fever.   HENT: Negative.    Eyes: Negative.    Respiratory: Negative for chest tightness, shortness of breath and wheezing.    Cardiovascular: Negative for chest pain, palpitations and leg swelling.   Gastrointestinal: Negative for abdominal distention, abdominal pain, blood in stool, diarrhea and vomiting.   Genitourinary: Negative for dysuria and hematuria.   Neurological: Negative for headaches.   Hematological: Negative for adenopathy.   Psychiatric/Behavioral: Negative for confusion.     Objective:     Vital Signs (Most Recent):  Temp: 98.6 °F (37 °C) (03/23/19 1110)  Pulse: 68 (03/23/19 1115)  Resp: (!) 9 (03/23/19 1115)  BP: (!) 138/94 (03/23/19 1115)  SpO2: 99 % (03/23/19 1115) Vital Signs (24h Range):  Temp:  [98.1 °F (36.7 °C)-99.4 °F (37.4 °C)] 98.6 °F (37 °C)  Pulse:  [64-77] 68  Resp:  [9-20] 9  SpO2:  [93 %-99 %] 99 %  BP: (118-202)/(59-94) 138/94     Weight: 63 kg (138 lb 14.2 oz)  Body mass index is 24.6 kg/m².    Intake/Output Summary (Last 24 hours) at 3/23/2019 1132  Last data filed at 3/23/2019 1110  Gross per 24 hour   Intake 1406.25 ml   Output 840 ml   Net 566.25 ml      Physical Exam   Constitutional: She appears well-developed and well-nourished. No distress.   Sleepy post op   HENT:   Head: Normocephalic and atraumatic.   Eyes: Pupils are equal, round, and reactive to light.   Neck: Neck supple. No thyromegaly present.   Cardiovascular: Normal rate and regular rhythm. Exam reveals no gallop and no friction rub.   No murmur heard.  Pulmonary/Chest: Effort normal and breath sounds normal. No respiratory distress. She has no wheezes.   Abdominal: Soft. Bowel sounds are normal. She exhibits no distension. There is no tenderness. There is no guarding.   Musculoskeletal: She exhibits no edema.    Bandage to surgery site   Neurological: She is alert.   Skin: Skin is warm and dry. No erythema.   Psychiatric: She has a normal mood and affect.       Significant Labs:   CBC:   Recent Labs   Lab 03/22/19  1533   WBC 7.00   HGB 10.0*   HCT 30.0*          Significant Imaging: I have reviewed all pertinent imaging results/findings within the past 24 hours.

## 2019-03-23 NOTE — PLAN OF CARE
Pt awake and oriented, vss, denies pain after PRN pain meds given, sleeping/snoring in between care, dressing to L hip cdi, tolerated sips of water, IV fluids infusing, SCD to L foot and R leg on. Ok per Dr. Pereyra to transfer the pt back to the floor. Pt transported back to room 324. Pt's daughters at the bedside. Call light within reach. Report given to KASHIF Mccartney.

## 2019-03-23 NOTE — ASSESSMENT & PLAN NOTE
Chronic-on ivf overnight -will maintain gentle hydration, trend I/O -has emma   Continue home meds

## 2019-03-24 LAB
ANION GAP SERPL CALC-SCNC: 6 MMOL/L (ref 8–16)
BACTERIA UR CULT: NORMAL
BASOPHILS # BLD AUTO: 0 K/UL (ref 0–0.2)
BASOPHILS NFR BLD: 0.2 % (ref 0–1.9)
BUN SERPL-MCNC: 39 MG/DL (ref 8–23)
CALCIUM SERPL-MCNC: 8.3 MG/DL (ref 8.7–10.5)
CHLORIDE SERPL-SCNC: 108 MMOL/L (ref 95–110)
CO2 SERPL-SCNC: 25 MMOL/L (ref 23–29)
CREAT SERPL-MCNC: 1.6 MG/DL (ref 0.5–1.4)
DIFFERENTIAL METHOD: ABNORMAL
EOSINOPHIL # BLD AUTO: 0 K/UL (ref 0–0.5)
EOSINOPHIL NFR BLD: 0.5 % (ref 0–8)
ERYTHROCYTE [DISTWIDTH] IN BLOOD BY AUTOMATED COUNT: 14.6 % (ref 11.5–14.5)
EST. GFR  (AFRICAN AMERICAN): 33 ML/MIN/1.73 M^2
EST. GFR  (NON AFRICAN AMERICAN): 29 ML/MIN/1.73 M^2
GLUCOSE SERPL-MCNC: 120 MG/DL (ref 70–110)
HCT VFR BLD AUTO: 26.6 % (ref 37–48.5)
HGB BLD-MCNC: 8.6 G/DL (ref 12–16)
LYMPHOCYTES # BLD AUTO: 1 K/UL (ref 1–4.8)
LYMPHOCYTES NFR BLD: 13.2 % (ref 18–48)
MCH RBC QN AUTO: 31.8 PG (ref 27–31)
MCHC RBC AUTO-ENTMCNC: 32.3 G/DL (ref 32–36)
MCV RBC AUTO: 98 FL (ref 82–98)
MONOCYTES # BLD AUTO: 0.6 K/UL (ref 0.3–1)
MONOCYTES NFR BLD: 8.5 % (ref 4–15)
NEUTROPHILS # BLD AUTO: 5.9 K/UL (ref 1.8–7.7)
NEUTROPHILS NFR BLD: 77.6 % (ref 38–73)
PLATELET # BLD AUTO: 127 K/UL (ref 150–350)
PMV BLD AUTO: 9.8 FL (ref 9.2–12.9)
POCT GLUCOSE: 123 MG/DL (ref 70–110)
POCT GLUCOSE: 167 MG/DL (ref 70–110)
POCT GLUCOSE: 170 MG/DL (ref 70–110)
POCT GLUCOSE: 174 MG/DL (ref 70–110)
POTASSIUM SERPL-SCNC: 5 MMOL/L (ref 3.5–5.1)
RBC # BLD AUTO: 2.71 M/UL (ref 4–5.4)
SODIUM SERPL-SCNC: 139 MMOL/L (ref 136–145)
WBC # BLD AUTO: 7.6 K/UL (ref 3.9–12.7)

## 2019-03-24 PROCEDURE — 25000003 PHARM REV CODE 250: Performed by: HOSPITALIST

## 2019-03-24 PROCEDURE — 94761 N-INVAS EAR/PLS OXIMETRY MLT: CPT

## 2019-03-24 PROCEDURE — 80048 BASIC METABOLIC PNL TOTAL CA: CPT

## 2019-03-24 PROCEDURE — 25000003 PHARM REV CODE 250: Performed by: ORTHOPAEDIC SURGERY

## 2019-03-24 PROCEDURE — 25000003 PHARM REV CODE 250: Performed by: INTERNAL MEDICINE

## 2019-03-24 PROCEDURE — 12000002 HC ACUTE/MED SURGE SEMI-PRIVATE ROOM

## 2019-03-24 PROCEDURE — 94640 AIRWAY INHALATION TREATMENT: CPT

## 2019-03-24 PROCEDURE — 97161 PT EVAL LOW COMPLEX 20 MIN: CPT

## 2019-03-24 PROCEDURE — 25000242 PHARM REV CODE 250 ALT 637 W/ HCPCS: Performed by: HOSPITALIST

## 2019-03-24 PROCEDURE — 85025 COMPLETE CBC W/AUTO DIFF WBC: CPT

## 2019-03-24 PROCEDURE — 27000221 HC OXYGEN, UP TO 24 HOURS

## 2019-03-24 PROCEDURE — 99900035 HC TECH TIME PER 15 MIN (STAT)

## 2019-03-24 PROCEDURE — 97116 GAIT TRAINING THERAPY: CPT

## 2019-03-24 RX ADMIN — FERROUS SULFATE TAB EC 325 MG (65 MG FE EQUIVALENT) 325 MG: 325 (65 FE) TABLET DELAYED RESPONSE at 08:03

## 2019-03-24 RX ADMIN — DOCUSATE SODIUM AND SENNOSIDES 1 TABLET: 8.6; 5 TABLET, FILM COATED ORAL at 08:03

## 2019-03-24 RX ADMIN — FLUTICASONE PROPIONATE 100 MCG: 50 SPRAY, METERED NASAL at 08:03

## 2019-03-24 RX ADMIN — SODIUM CHLORIDE: 0.9 INJECTION, SOLUTION INTRAVENOUS at 03:03

## 2019-03-24 RX ADMIN — HYDROCODONE BITARTRATE AND ACETAMINOPHEN 1 TABLET: 5; 325 TABLET ORAL at 12:03

## 2019-03-24 RX ADMIN — LEVOTHYROXINE SODIUM 88 MCG: 88 TABLET ORAL at 05:03

## 2019-03-24 RX ADMIN — OXYCODONE HYDROCHLORIDE AND ACETAMINOPHEN 500 MG: 500 TABLET ORAL at 08:03

## 2019-03-24 RX ADMIN — AMLODIPINE BESYLATE 5 MG: 5 TABLET ORAL at 08:03

## 2019-03-24 RX ADMIN — HYDROCODONE BITARTRATE AND ACETAMINOPHEN 1 TABLET: 5; 325 TABLET ORAL at 02:03

## 2019-03-24 RX ADMIN — FLUTICASONE FUROATE AND VILANTEROL TRIFENATATE 1 PUFF: 100; 25 POWDER RESPIRATORY (INHALATION) at 09:03

## 2019-03-24 RX ADMIN — GABAPENTIN 300 MG: 300 CAPSULE ORAL at 08:03

## 2019-03-24 RX ADMIN — HYDROCODONE BITARTRATE AND ACETAMINOPHEN 1 TABLET: 5; 325 TABLET ORAL at 04:03

## 2019-03-24 RX ADMIN — APIXABAN 2.5 MG: 2.5 TABLET, FILM COATED ORAL at 08:03

## 2019-03-24 NOTE — SUBJECTIVE & OBJECTIVE
Principal Problem:Closed left hip fracture     Interval History: Sleeping soundly.    Review of patient's allergies indicates:   Allergen Reactions    Carvedilol Other (See Comments)     Bradycardia and syncope    Boniva [ibandronate]     Codeine     Hydralazine analogues     Iodinated contrast- oral and iv dye Hives    Kionex [sodium polystyrene sulfonate] Diarrhea     From encounter 12/11/17 for diarrhea--not true allergy.    Lisinopril     Morphine        Current Facility-Administered Medications   Medication    0.9%  NaCl infusion (for blood administration)    0.9%  NaCl infusion    acetaminophen tablet 650 mg    albuterol-ipratropium 2.5 mg-0.5 mg/3 mL nebulizer solution 3 mL    amLODIPine tablet 10 mg    ascorbic acid (vitamin C) tablet 500 mg    dextrose 50% injection 12.5 g    dextrose 50% injection 25 g    ferrous sulfate EC tablet 325 mg    fluticasone 50 mcg/actuation nasal spray 100 mcg    fluticasone-vilanterol 100-25 mcg/dose diskus inhaler 1 puff    gabapentin capsule 300 mg    glucagon (human recombinant) injection 1 mg    glucose chewable tablet 16 g    glucose chewable tablet 24 g    HYDROcodone-acetaminophen 5-325 mg per tablet 1 tablet    hydromorphone (PF) injection 0.5 mg    insulin aspart U-100 pen 0-5 Units    levothyroxine tablet 88 mcg    loperamide capsule 2 mg    magnesium oxide tablet 800 mg    magnesium oxide tablet 800 mg    metoclopramide HCl injection 10 mg    nitroGLYCERIN SL tablet 0.4 mg    ondansetron injection 4 mg    potassium chloride 10% oral solution 40 mEq    potassium chloride 10% oral solution 40 mEq    promethazine (PHENERGAN) 6.25 mg in dextrose 5 % 50 mL IVPB    ramelteon tablet 8 mg    senna-docusate 8.6-50 mg per tablet 1 tablet    senna-docusate 8.6-50 mg per tablet 1 tablet    sodium chloride 0.9% flush 3 mL    sodium chloride 0.9% flush 5 mL    sodium chloride 0.9% flush 5 mL     Objective:     Vital Signs (Most  "Recent):  Temp: 98 °F (36.7 °C) (03/24/19 0502)  Pulse: 60 (03/24/19 0502)  Resp: 16 (03/24/19 0502)  BP: (!) 122/57 (03/24/19 0502)  SpO2: 99 % (03/24/19 0502) Vital Signs (24h Range):  Temp:  [96.4 °F (35.8 °C)-99 °F (37.2 °C)] 98 °F (36.7 °C)  Pulse:  [60-87] 60  Resp:  [9-20] 16  SpO2:  [94 %-99 %] 99 %  BP: (117-175)/(53-94) 122/57     Weight: 63 kg (138 lb 14.2 oz)  Height: 5' 3" (160 cm)  Body mass index is 24.6 kg/m².      Intake/Output Summary (Last 24 hours) at 3/24/2019 0707  Last data filed at 3/24/2019 0500  Gross per 24 hour   Intake 1379 ml   Output 515 ml   Net 864 ml       General    Nursing note and vitals reviewed.  Constitutional: She is oriented to person, place, and time. She appears well-developed and well-nourished. No distress.   HENT:   Head: Atraumatic.   Eyes: EOM are normal.   Cardiovascular: Normal rate.    Pulmonary/Chest: Effort normal.   Abdominal: Soft.   Neurological: She is alert and oriented to person, place, and time.   Psychiatric: Her behavior is normal.             Right Hip Exam   Right hip exam is normal.   Left Hip Exam     Inspection   Swelling: present  Erythema: absent    Tenderness   The patient tender to palpation of the trochanteric bursa.    Range of Motion   Extension: abnormal   Flexion: abnormal     Tests   Log Roll: positive    Other   Sensation: normal    Comments:  Dsg c/d/i          Vascular Exam       Left Pulses  Dorsalis Pedis:      2+              Significant Labs:   CBC:   Recent Labs   Lab 03/22/19  1533   WBC 7.00   HGB 10.0*   HCT 30.0*        CMP:   Recent Labs   Lab 03/22/19  1532 03/23/19  0615    141   K 4.2 4.3    106   CO2 30* 30*   GLU 86 103   BUN 30* 29*   CREATININE 1.4 1.2   CALCIUM 10.4 9.1   ANIONGAP 7* 5*   EGFRNONAA 34* 40*     Coagulation:   Recent Labs   Lab 03/22/19  1532   LABPROT 10.6   INR 1.0     All pertinent labs within the past 24 hours have been reviewed.    Significant Imaging: None  "

## 2019-03-24 NOTE — ASSESSMENT & PLAN NOTE
Monitor and transfuse if he becomes hemodynamically unstable or H/H < 7/21  Pt also has DEREJE-resume po iron and vit c  Trend h/h -have type and screen   Restart eliquis today

## 2019-03-24 NOTE — PT/OT/SLP EVAL
Physical Therapy Evaluation    Patient Name:  Blaire Cage   MRN:  [id number]    Recommendations:     Discharge Recommendations:  rehabilitation facility   Discharge Equipment Recommendations: none   Barriers to discharge: Decreased caregiver support    Assessment:     Blaire Cage is a 88 y.o. female admitted with a medical diagnosis of Closed left hip fracture.  She presents with the following impairments/functional limitations:  weakness, impaired balance, impaired endurance, impaired functional mobilty, pain, gait instability, decreased lower extremity function, impaired cardiopulmonary response to activity, orthopedic precautions.    Rehab Prognosis: Good; patient would benefit from acute skilled PT services to address these deficits and reach maximum level of function.    Recent Surgery: Procedure(s) (LRB):  PINNING, HIP, PERCUTANEOUS (Left) 1 Day Post-Op    Plan:     During this hospitalization, patient to be seen BID to address the identified rehab impairments via gait training, therapeutic activities, therapeutic exercises and progress toward the following goals:    · Plan of Care Expires:  04/07/19    Subjective     Chief Complaint: generalized weakness  Patient/Family Comments/goals: improve overall strength/mobility  Pain/Comfort:  · Pain Rating 1: 7/10  · Location - Side 1: Left  · Location 1: hip  · Pain Addressed 1: Cessation of Activity  · Pain Rating Post-Intervention 1: 5/10  · Pain Rating 2: 0/10    Patients cultural, spiritual, Cheondoism conflicts given the current situation:      Living Environment:  Lives alone in 1-story home (no steps).  Prior to admission, patients level of function was independent.  Equipment used at home: walker, rolling, bath bench.  DME owned (not currently used): rolling walker.  Upon discharge, patient will have assistance from family.    Objective:     Communicated with RN prior to session.  Patient found supine with bed alarm, oxygen, peripheral IV, carter  catheter  upon PT entry to room.    General Precautions: Standard, contact, fall   Orthopedic Precautions:LLE weight bearing as tolerated   Braces: N/A     Exams:  · RLE ROM: WFL  · RLE Strength: Deficits: grossly 4-/5  · LLE ROM: Deficits: limited due to pain  · LLE Strength: Deficits: 3-/5 hip and knee, 4-/5 ankle    Functional Mobility:  · Bed Mobility:     · Scooting: minimum assistance and moderate assistance  · Supine to Sit: minimum assistance and moderate assistance  · Transfers:     · Sit to Stand:  minimum assistance and moderate assistance with rolling walker  · Gait: 12 ft w/ RW and CGA/3 liters of oxygen      Therapeutic Activities and Exercises:   Pt. placed in bedside recliner after amb. - RN alerted.    AM-PAC 6 CLICK MOBILITY  Total Score:12     Patient left up in chair with all lines intact, call button in reach and RN notified.    GOALS:   Multidisciplinary Problems     Physical Therapy Goals        Problem: Physical Therapy Goal    Goal Priority Disciplines Outcome Goal Variances Interventions   Physical Therapy Goal     PT, PT/OT      Description:  Goals to be met by: 19     Patient will increase functional independence with mobility by performin. Supine to sit with MInimal Assistance  2. Sit to stand transfer with Minimal Assistance  3. Gait  x 50 feet with Contact Guard Assistance using Rolling Walker.   4. Lower extremity exercise program x 20 reps, with assistance as needed                      History:     Past Medical History:   Diagnosis Date    Anemia due to multiple mechanisms 2018    Anemia, chronic disease 2018    Anemia, chronic renal failure, stage 3 (moderate) 2018    Anticoagulant long-term use     aspirin    Aortic aneurysm     CHF (congestive heart failure)     COPD (chronic obstructive pulmonary disease)     COPD with acute exacerbation 2015    Diabetes mellitus type II     DVT (deep venous thrombosis) 2018    Encounter for blood  transfusion     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 8/7/2018    Hip arthritis 3/1/2016    Homocysteinemia 8/7/2018    Hyperlipidemia     Hypertension     Normocytic normochromic anemia 6/29/2018    PE (pulmonary thromboembolism) 06/09/2018    Pneumonia of right lower lobe due to infectious organism 9/11/2017    Thyroid disease     hypothyroid       Past Surgical History:   Procedure Laterality Date    APPENDECTOMY      CHOLECYSTECTOMY      ESOPHAGOGASTRODUODENOSCOPY (EGD) N/A 10/25/2017    Performed by Fadi Flores [facility] at [facility] ENDO    FRACTURE SURGERY      right hip     HERNIA REPAIR      groin    HYSTERECTOMY      Insertion, Implantable Loop Recorder N/A 12/12/2018    Performed by Ashok Herbert MD at [facility] CATH LAB    VARICOSE VEIN SURGERY         Time Tracking:     PT Received On: 03/24/19  PT Start Time: 1000     PT Stop Time: 1045  PT Total Time (min): 45 min     Billable Minutes: Evaluation 15 and Gait Training 30      Young Traylor, PT  03/24/2019

## 2019-03-24 NOTE — PLAN OF CARE
03/24/19 0900   Patient Assessment/Suction   Level of Consciousness (AVPU) alert   Respiratory Effort Unlabored   All Lung Fields Breath Sounds clear   Rhythm/Pattern, Respiratory no shortness of breath reported   PRE-TX-O2   O2 Device (Oxygen Therapy) nasal cannula   Flow (L/min) 2   SpO2 97 %   Pulse Oximetry Type Intermittent   Pulse 62   Resp 18   Aerosol Therapy   $ Aerosol Therapy Charges PRN treatment not required   Inhaler   $ Inhaler Charges MDI (Metered Dose Inahler) Treatment;Mouth rinsed post treatment   Respiratory Treatment Status (Inhaler) given   Treatment Route (Inhaler) mouthpiece   Patient Position (Inhaler) HOB elevated   Signs of Intolerance (Inhaler) none   Breath Sounds Post-Respiratory Treatment   Post-treatment Heart Rate (beats/min) 62

## 2019-03-24 NOTE — PLAN OF CARE
Problem: Adult Inpatient Plan of Care  Goal: Plan of Care Review  Outcome: Ongoing (interventions implemented as appropriate)   Pt remains free from fall or injury, call for assistance with ambulating. Reviewed/ instructed patient and daughter on plan of care related to pain to right hip. Pain controlled with PO meds. Pt educated on hip precautions from bed to chair.Pt sat up in the chair most of the day. Neuro vascular checks WDL  Surgical dressing clean, dry and intact with scant amount of drainage. Pt bed in low positron, call light with in reach room next to nurse station.

## 2019-03-24 NOTE — SUBJECTIVE & OBJECTIVE
Interval History: patient seen and examined. No acute events. Pain controlled and doing well.     Review of Systems   Constitutional: Positive for fatigue. Negative for activity change, appetite change and fever.   HENT: Negative.    Eyes: Negative.    Respiratory: Negative for chest tightness, shortness of breath and wheezing.    Cardiovascular: Negative for chest pain, palpitations and leg swelling.   Gastrointestinal: Negative for abdominal distention, abdominal pain, blood in stool, diarrhea and vomiting.   Genitourinary: Negative for dysuria and hematuria.   Musculoskeletal: Positive for arthralgias.   Neurological: Negative for headaches.   Hematological: Negative for adenopathy.   Psychiatric/Behavioral: Negative for confusion.     Objective:     Vital Signs (Most Recent):  Temp: 98.2 °F (36.8 °C) (03/24/19 1242)  Pulse: 63 (03/24/19 1242)  Resp: 18 (03/24/19 1242)  BP: (!) 133/58 (03/24/19 1242)  SpO2: (!) 94 % (03/24/19 1242) Vital Signs (24h Range):  Temp:  [96.4 °F (35.8 °C)-98.8 °F (37.1 °C)] 98.2 °F (36.8 °C)  Pulse:  [60-87] 63  Resp:  [15-18] 18  SpO2:  [94 %-99 %] 94 %  BP: (117-147)/(57-66) 133/58     Weight: 63 kg (138 lb 14.2 oz)  Body mass index is 24.6 kg/m².    Intake/Output Summary (Last 24 hours) at 3/24/2019 1300  Last data filed at 3/24/2019 0500  Gross per 24 hour   Intake 819 ml   Output 425 ml   Net 394 ml      Physical Exam   Constitutional: She is oriented to person, place, and time. She appears well-developed and well-nourished. No distress.   HENT:   Head: Normocephalic and atraumatic.   Eyes: Pupils are equal, round, and reactive to light.   Neck: Neck supple. No thyromegaly present.   Cardiovascular: Normal rate and regular rhythm. Exam reveals no gallop and no friction rub.   No murmur heard.  Pulmonary/Chest: Effort normal and breath sounds normal. No respiratory distress. She has no wheezes.   Abdominal: Soft. Bowel sounds are normal. She exhibits no distension. There is no  tenderness. There is no guarding.   Musculoskeletal: Normal range of motion. She exhibits no edema.   Neurological: She is alert and oriented to person, place, and time.   Skin: Skin is warm and dry. No erythema.   Incision c/d/i   Psychiatric: She has a normal mood and affect.       Significant Labs:   CBC:   Recent Labs   Lab 03/22/19  1533 03/24/19  0539   WBC 7.00 7.60   HGB 10.0* 8.6*   HCT 30.0* 26.6*    127*       Significant Imaging: I have reviewed all pertinent imaging results/findings within the past 24 hours.

## 2019-03-24 NOTE — PLAN OF CARE
Problem: Adult Inpatient Plan of Care  Goal: Plan of Care Review  Outcome: Ongoing (interventions implemented as appropriate)  Pt AAO, c/o pain well controlled to L hip and L heel. Pt reports that she has been having problems with L heel for some time now and that she sees Dr. Graham for it. Dressing to L hip CDI. IVF infusing, carter to gravity. Pedal pulses present +2 bilaterally. Good cap refill BLE, no numbness, or tingling reported by pt. IV abx admin as ordered. All meds and POC reviewed with pt, verbalizes understanding, call light in reach, room near nurses station, frequent checks performed.

## 2019-03-24 NOTE — ASSESSMENT & PLAN NOTE
CKD (chronic kidney disease) stage 3, GFR 30-59 ml/min renal dose all meds; no nephrotoxic agents     Keep Map > 65   - Cr slightly above baseline  - Continue IVF cautiously for now

## 2019-03-24 NOTE — PLAN OF CARE
03/23/19 2100   Patient Assessment/Suction   Level of Consciousness (AVPU) alert   Respiratory Effort Unlabored   Expansion/Accessory Muscles/Retractions no use of accessory muscles   All Lung Fields Breath Sounds clear   PRE-TX-O2   O2 Device (Oxygen Therapy) nasal cannula   $ Is the patient on Low Flow Oxygen? Yes   Flow (L/min) 4   SpO2 99 %   Pulse Oximetry Type Intermittent   Pulse 87   Resp 18   Aerosol Therapy   $ Aerosol Therapy Charges PRN treatment not required

## 2019-03-24 NOTE — PROGRESS NOTES
[facility]  Orthopedics  Progress Note    Patient Name: Blaire Cage  MRN: [id number]  Admission Date: 3/22/2019  Hospital Length of Stay: 2 days  Attending Provider: Tony Bender MD  Primary Care Provider: Eli Edmonds MD  Follow-up For: Procedure(s) (LRB):  PINNING, HIP, PERCUTANEOUS (Left)    Post-Operative Day: 1 Day Post-Op  Subjective:     Principal Problem:Closed left hip fracture     Interval History: Sleeping soundly.    Review of patient's allergies indicates:   Allergen Reactions    Carvedilol Other (See Comments)     Bradycardia and syncope    Boniva [ibandronate]     Codeine     Hydralazine analogues     Iodinated contrast- oral and iv dye Hives    Kionex [sodium polystyrene sulfonate] Diarrhea     From encounter 12/11/17 for diarrhea--not true allergy.    Lisinopril     Morphine        Current Facility-Administered Medications   Medication    0.9%  NaCl infusion (for blood administration)    0.9%  NaCl infusion    acetaminophen tablet 650 mg    albuterol-ipratropium 2.5 mg-0.5 mg/3 mL nebulizer solution 3 mL    amLODIPine tablet 10 mg    ascorbic acid (vitamin C) tablet 500 mg    dextrose 50% injection 12.5 g    dextrose 50% injection 25 g    ferrous sulfate EC tablet 325 mg    fluticasone 50 mcg/actuation nasal spray 100 mcg    fluticasone-vilanterol 100-25 mcg/dose diskus inhaler 1 puff    gabapentin capsule 300 mg    glucagon (human recombinant) injection 1 mg    glucose chewable tablet 16 g    glucose chewable tablet 24 g    HYDROcodone-acetaminophen 5-325 mg per tablet 1 tablet    hydromorphone (PF) injection 0.5 mg    insulin aspart U-100 pen 0-5 Units    levothyroxine tablet 88 mcg    loperamide capsule 2 mg    magnesium oxide tablet 800 mg    magnesium oxide tablet 800 mg    metoclopramide HCl injection 10 mg    nitroGLYCERIN SL tablet 0.4 mg    ondansetron injection 4 mg    potassium chloride 10% oral solution 40 mEq    potassium  "chloride 10% oral solution 40 mEq    promethazine (PHENERGAN) 6.25 mg in dextrose 5 % 50 mL IVPB    ramelteon tablet 8 mg    senna-docusate 8.6-50 mg per tablet 1 tablet    senna-docusate 8.6-50 mg per tablet 1 tablet    sodium chloride 0.9% flush 3 mL    sodium chloride 0.9% flush 5 mL    sodium chloride 0.9% flush 5 mL     Objective:     Vital Signs (Most Recent):  Temp: 98 °F (36.7 °C) (03/24/19 0502)  Pulse: 60 (03/24/19 0502)  Resp: 16 (03/24/19 0502)  BP: (!) 122/57 (03/24/19 0502)  SpO2: 99 % (03/24/19 0502) Vital Signs (24h Range):  Temp:  [96.4 °F (35.8 °C)-99 °F (37.2 °C)] 98 °F (36.7 °C)  Pulse:  [60-87] 60  Resp:  [9-20] 16  SpO2:  [94 %-99 %] 99 %  BP: (117-175)/(53-94) 122/57     Weight: 63 kg (138 lb 14.2 oz)  Height: 5' 3" (160 cm)  Body mass index is 24.6 kg/m².      Intake/Output Summary (Last 24 hours) at 3/24/2019 0707  Last data filed at 3/24/2019 0500  Gross per 24 hour   Intake 1379 ml   Output 515 ml   Net 864 ml       General    Nursing note and vitals reviewed.  Constitutional: She is oriented to person, place, and time. She appears well-developed and well-nourished. No distress.   HENT:   Head: Atraumatic.   Eyes: EOM are normal.   Cardiovascular: Normal rate.    Pulmonary/Chest: Effort normal.   Abdominal: Soft.   Neurological: She is alert and oriented to person, place, and time.   Psychiatric: Her behavior is normal.             Right Hip Exam   Right hip exam is normal.   Left Hip Exam     Inspection   Swelling: present  Erythema: absent    Tenderness   The patient tender to palpation of the trochanteric bursa.    Range of Motion   Extension: abnormal   Flexion: abnormal     Tests   Log Roll: positive    Other   Sensation: normal    Comments:  Dsg c/d/i          Vascular Exam       Left Pulses  Dorsalis Pedis:      2+              Significant Labs:   CBC:   Recent Labs   Lab 03/22/19  1533   WBC 7.00   HGB 10.0*   HCT 30.0*        CMP:   Recent Labs   Lab 03/22/19  1532 " 03/23/19  0615    141   K 4.2 4.3    106   CO2 30* 30*   GLU 86 103   BUN 30* 29*   CREATININE 1.4 1.2   CALCIUM 10.4 9.1   ANIONGAP 7* 5*   EGFRNONAA 34* 40*     Coagulation:   Recent Labs   Lab 03/22/19  1532   LABPROT 10.6   INR 1.0     All pertinent labs within the past 24 hours have been reviewed.    Significant Imaging: None    Assessment/Plan:     * Closed left hip fracture  Start PT. WBAT LLE with assistive device.  Pain control  Placement vs home with           Jorje Hamlin MD  Orthopedics  [facility]

## 2019-03-24 NOTE — PROGRESS NOTES
"[facility] Medicine  Progress Note    Patient Name: Blaire Cage  MRN: [id number]  Patient Class: IP- Inpatient   Admission Date: 3/22/2019  Length of Stay: 2 days  Attending Physician: Tony Bender MD  Primary Care Provider: Eli Edmonds MD        Subjective:     Principal Problem:Closed left hip fracture    HPI:  Blaire Cage is a 88 y.o. female PMHx includes HTN, HLD, DM, COPD, hip arthritis, PE, DVT, CHF, and an aortic aneurysm.  who presents to the ED via EMS complaining of left leg pain and left hip pain s/p a fall immediately PTA. Pt's daughter states that there were no witnesses to the fall. Pt explains that she has pain in her upper left leg, left hip, and left groin. She states that she has no pain on her right side. The patient denies ankle pain, knee pain, or any other symptoms at this time.  Orthopedic SHx includes a right hip fracture surgery.  Pt was in severe pain on arrival to floor but resolved with small dose of dilaudid-she was out washing car and fell. Friends found her. She denies chest pain or sob or loc and no loss of bowel/bladder control. She is oriented x 3. Daughter present at time of my exam. Pt lives by herself and says, "I am stubborn".     Evaluation in ED revealed subcapital hip fx left and mild anemia.   Impression       Impacted left subcapital femoral neck fracture.  Possible right inferior pubic ramus fracture.  Status post right total hip arthroplasty.  Osteopenia.  Electronically signed by: Toe Hewitt MD  Date: 03/22/2019           Hospital Course:  No notes on file    Interval History: patient seen and examined. No acute events. Pain controlled and doing well.     Review of Systems   Constitutional: Positive for fatigue. Negative for activity change, appetite change and fever.   HENT: Negative.    Eyes: Negative.    Respiratory: Negative for chest tightness, shortness of breath and wheezing.    Cardiovascular: Negative for " chest pain, palpitations and leg swelling.   Gastrointestinal: Negative for abdominal distention, abdominal pain, blood in stool, diarrhea and vomiting.   Genitourinary: Negative for dysuria and hematuria.   Musculoskeletal: Positive for arthralgias.   Neurological: Negative for headaches.   Hematological: Negative for adenopathy.   Psychiatric/Behavioral: Negative for confusion.     Objective:     Vital Signs (Most Recent):  Temp: 98.2 °F (36.8 °C) (03/24/19 1242)  Pulse: 63 (03/24/19 1242)  Resp: 18 (03/24/19 1242)  BP: (!) 133/58 (03/24/19 1242)  SpO2: (!) 94 % (03/24/19 1242) Vital Signs (24h Range):  Temp:  [96.4 °F (35.8 °C)-98.8 °F (37.1 °C)] 98.2 °F (36.8 °C)  Pulse:  [60-87] 63  Resp:  [15-18] 18  SpO2:  [94 %-99 %] 94 %  BP: (117-147)/(57-66) 133/58     Weight: 63 kg (138 lb 14.2 oz)  Body mass index is 24.6 kg/m².    Intake/Output Summary (Last 24 hours) at 3/24/2019 1300  Last data filed at 3/24/2019 0500  Gross per 24 hour   Intake 819 ml   Output 425 ml   Net 394 ml      Physical Exam   Constitutional: She is oriented to person, place, and time. She appears well-developed and well-nourished. No distress.   HENT:   Head: Normocephalic and atraumatic.   Eyes: Pupils are equal, round, and reactive to light.   Neck: Neck supple. No thyromegaly present.   Cardiovascular: Normal rate and regular rhythm. Exam reveals no gallop and no friction rub.   No murmur heard.  Pulmonary/Chest: Effort normal and breath sounds normal. No respiratory distress. She has no wheezes.   Abdominal: Soft. Bowel sounds are normal. She exhibits no distension. There is no tenderness. There is no guarding.   Musculoskeletal: Normal range of motion. She exhibits no edema.   Neurological: She is alert and oriented to person, place, and time.   Skin: Skin is warm and dry. No erythema.   Incision c/d/i   Psychiatric: She has a normal mood and affect.       Significant Labs:   CBC:   Recent Labs   Lab 03/22/19  1533 03/24/19  0539   WBC  7.00 7.60   HGB 10.0* 8.6*   HCT 30.0* 26.6*    127*       Significant Imaging: I have reviewed all pertinent imaging results/findings within the past 24 hours.    Assessment/Plan:      * Closed left hip fracture  - s/p repair  - restart eliquis  - pain control  - Diet as tolerated      Hip pain, left        Diastolic CHF, chronic    Chronic-on ivf overnight -will maintain gentle hydration, trend I/O -has carter   Continue home meds     Anemia due to multiple mechanisms  Monitor and transfuse if he becomes hemodynamically unstable or H/H < 7/21  Pt also has DEREJE-resume po iron and vit c  Trend h/h -have type and screen   Restart eliquis today        Type 2 diabetes mellitus with kidney complication, without long-term current use of insulin    DM2-hold po medications; Accucks and ISS; start long acting insulin as indicated. As follows:  Start levemir at 0.2 units/kg daily  Start humalog 0.08 units/kg with meals     Asthma-COPD overlap syndrome    Chronic, stable-on continuous home O2.   Ordered nebs prn   cxr-non-acute     Hypertension associated with diabetes  Chronic, stable  Hypertension Medications             amLODIPine (NORVASC) 10 MG tablet TAKE ONE TABLET BY MOUTH ONCE DAILY    nitroGLYCERIN (NITROSTAT) 0.4 MG SL tablet Place 1 tablet (0.4 mg total) under the tongue every 5 (five) minutes as needed for Chest pain. As needed      Hospital Medications             amLODIPine tablet 10 mg Starting on 3/23/2019. Take 2 tablets (10 mg total) by mouth once daily.    nitroGLYCERIN SL tablet 0.4 mg Place 1 tablet (0.4 mg total) under the tongue every 5 (five) minutes as needed for Chest pain.            CKD (chronic kidney disease), stage III, eGFR 39, progressive 31     CKD (chronic kidney disease) stage 3, GFR 30-59 ml/min renal dose all meds; no nephrotoxic agents     Keep Map > 65   - Cr slightly above baseline  - Continue IVF cautiously for now      Diabetic neuropathy, type II diabetes mellitus    Chronic,  stable -continue hs gabapentin      VTE Risk Mitigation (From admission, onward)        Ordered     Place JIMMY hose  Until discontinued      03/22/19 1851     Place sequential compression device  Until discontinued      03/22/19 1851     Reason for No Pharmacological VTE Prophylaxis  Once      03/22/19 1851     IP VTE HIGH RISK PATIENT  Once      03/22/19 1851              Tony Bender MD  Department of Hospital Medicine   [facility]

## 2019-03-25 LAB
ANION GAP SERPL CALC-SCNC: 6 MMOL/L (ref 8–16)
BUN SERPL-MCNC: 37 MG/DL (ref 8–23)
CALCIUM SERPL-MCNC: 8 MG/DL (ref 8.7–10.5)
CHLORIDE SERPL-SCNC: 108 MMOL/L (ref 95–110)
CO2 SERPL-SCNC: 24 MMOL/L (ref 23–29)
CREAT SERPL-MCNC: 1.4 MG/DL (ref 0.5–1.4)
EST. GFR  (AFRICAN AMERICAN): 39 ML/MIN/1.73 M^2
EST. GFR  (NON AFRICAN AMERICAN): 34 ML/MIN/1.73 M^2
GLUCOSE SERPL-MCNC: 123 MG/DL (ref 70–110)
POCT GLUCOSE: 120 MG/DL (ref 70–110)
POCT GLUCOSE: 132 MG/DL (ref 70–110)
POCT GLUCOSE: 138 MG/DL (ref 70–110)
POCT GLUCOSE: 175 MG/DL (ref 70–110)
POTASSIUM SERPL-SCNC: 4.1 MMOL/L (ref 3.5–5.1)
SODIUM SERPL-SCNC: 138 MMOL/L (ref 136–145)

## 2019-03-25 PROCEDURE — G8987 SELF CARE CURRENT STATUS: HCPCS | Mod: CK

## 2019-03-25 PROCEDURE — 63600175 PHARM REV CODE 636 W HCPCS: Performed by: HOSPITALIST

## 2019-03-25 PROCEDURE — 86580 TB INTRADERMAL TEST: CPT | Performed by: HOSPITALIST

## 2019-03-25 PROCEDURE — 94761 N-INVAS EAR/PLS OXIMETRY MLT: CPT

## 2019-03-25 PROCEDURE — 97530 THERAPEUTIC ACTIVITIES: CPT

## 2019-03-25 PROCEDURE — 97116 GAIT TRAINING THERAPY: CPT

## 2019-03-25 PROCEDURE — 25000003 PHARM REV CODE 250: Performed by: INTERNAL MEDICINE

## 2019-03-25 PROCEDURE — 25000003 PHARM REV CODE 250: Performed by: ORTHOPAEDIC SURGERY

## 2019-03-25 PROCEDURE — 36415 COLL VENOUS BLD VENIPUNCTURE: CPT

## 2019-03-25 PROCEDURE — G8988 SELF CARE GOAL STATUS: HCPCS | Mod: CJ

## 2019-03-25 PROCEDURE — 97165 OT EVAL LOW COMPLEX 30 MIN: CPT

## 2019-03-25 PROCEDURE — 25000003 PHARM REV CODE 250: Performed by: HOSPITALIST

## 2019-03-25 PROCEDURE — 97535 SELF CARE MNGMENT TRAINING: CPT

## 2019-03-25 PROCEDURE — 97110 THERAPEUTIC EXERCISES: CPT

## 2019-03-25 PROCEDURE — 94640 AIRWAY INHALATION TREATMENT: CPT

## 2019-03-25 PROCEDURE — 25000242 PHARM REV CODE 250 ALT 637 W/ HCPCS: Performed by: HOSPITALIST

## 2019-03-25 PROCEDURE — 27000221 HC OXYGEN, UP TO 24 HOURS

## 2019-03-25 PROCEDURE — 12000002 HC ACUTE/MED SURGE SEMI-PRIVATE ROOM

## 2019-03-25 PROCEDURE — 80048 BASIC METABOLIC PNL TOTAL CA: CPT

## 2019-03-25 RX ADMIN — FERROUS SULFATE TAB EC 325 MG (65 MG FE EQUIVALENT) 325 MG: 325 (65 FE) TABLET DELAYED RESPONSE at 09:03

## 2019-03-25 RX ADMIN — OXYCODONE HYDROCHLORIDE AND ACETAMINOPHEN 500 MG: 500 TABLET ORAL at 09:03

## 2019-03-25 RX ADMIN — GABAPENTIN 300 MG: 300 CAPSULE ORAL at 08:03

## 2019-03-25 RX ADMIN — HYDROCODONE BITARTRATE AND ACETAMINOPHEN 1 TABLET: 5; 325 TABLET ORAL at 01:03

## 2019-03-25 RX ADMIN — HYDROCODONE BITARTRATE AND ACETAMINOPHEN 1 TABLET: 5; 325 TABLET ORAL at 09:03

## 2019-03-25 RX ADMIN — FLUTICASONE FUROATE AND VILANTEROL TRIFENATATE 1 PUFF: 100; 25 POWDER RESPIRATORY (INHALATION) at 08:03

## 2019-03-25 RX ADMIN — TUBERCULIN PURIFIED PROTEIN DERIVATIVE 5 UNITS: 5 INJECTION, SOLUTION INTRADERMAL at 04:03

## 2019-03-25 RX ADMIN — APIXABAN 2.5 MG: 2.5 TABLET, FILM COATED ORAL at 09:03

## 2019-03-25 RX ADMIN — HYDROCODONE BITARTRATE AND ACETAMINOPHEN 1 TABLET: 5; 325 TABLET ORAL at 04:03

## 2019-03-25 RX ADMIN — FLUTICASONE PROPIONATE 100 MCG: 50 SPRAY, METERED NASAL at 12:03

## 2019-03-25 RX ADMIN — FERROUS SULFATE TAB EC 325 MG (65 MG FE EQUIVALENT) 325 MG: 325 (65 FE) TABLET DELAYED RESPONSE at 08:03

## 2019-03-25 RX ADMIN — AMLODIPINE BESYLATE 10 MG: 5 TABLET ORAL at 09:03

## 2019-03-25 RX ADMIN — APIXABAN 2.5 MG: 2.5 TABLET, FILM COATED ORAL at 08:03

## 2019-03-25 RX ADMIN — LEVOTHYROXINE SODIUM 88 MCG: 88 TABLET ORAL at 05:03

## 2019-03-25 RX ADMIN — DOCUSATE SODIUM AND SENNOSIDES 1 TABLET: 8.6; 5 TABLET, FILM COATED ORAL at 09:03

## 2019-03-25 RX ADMIN — IPRATROPIUM BROMIDE AND ALBUTEROL SULFATE 3 ML: .5; 3 SOLUTION RESPIRATORY (INHALATION) at 08:03

## 2019-03-25 RX ADMIN — DOCUSATE SODIUM AND SENNOSIDES 1 TABLET: 8.6; 5 TABLET, FILM COATED ORAL at 08:03

## 2019-03-25 NOTE — NURSING
Pt found doing OT exercise with OT at side O2 was off pt sat was 82% applied O2 back on sat up to 93% vs signs taken also,

## 2019-03-25 NOTE — PROGRESS NOTES
[facility]  Orthopedics  Progress Note    Patient Name: Blaire Cage  MRN: [id number]  Admission Date: 3/22/2019  Hospital Length of Stay: 3 days  Attending Provider: Gaby Zuñiga MD  Primary Care Provider: Eli Edmonds MD  Follow-up For: Procedure(s) (LRB):  PINNING, HIP, PERCUTANEOUS (Left)    Post-Operative Day: 2 Days Post-Op  Subjective:     Principal Problem:Closed left hip fracture     Interval History: Had a little desat when standing.     Review of patient's allergies indicates:   Allergen Reactions    Carvedilol Other (See Comments)     Bradycardia and syncope    Boniva [ibandronate]     Codeine     Hydralazine analogues     Iodinated contrast- oral and iv dye Hives    Kionex [sodium polystyrene sulfonate] Diarrhea     From encounter 12/11/17 for diarrhea--not true allergy.    Lisinopril     Morphine        Current Facility-Administered Medications   Medication    0.9%  NaCl infusion (for blood administration)    0.9%  NaCl infusion    acetaminophen tablet 650 mg    albuterol-ipratropium 2.5 mg-0.5 mg/3 mL nebulizer solution 3 mL    amLODIPine tablet 10 mg    apixaban tablet 2.5 mg    ascorbic acid (vitamin C) tablet 500 mg    dextrose 50% injection 12.5 g    dextrose 50% injection 25 g    ferrous sulfate EC tablet 325 mg    fluticasone 50 mcg/actuation nasal spray 100 mcg    fluticasone-vilanterol 100-25 mcg/dose diskus inhaler 1 puff    gabapentin capsule 300 mg    glucagon (human recombinant) injection 1 mg    glucose chewable tablet 16 g    glucose chewable tablet 24 g    HYDROcodone-acetaminophen 5-325 mg per tablet 1 tablet    hydromorphone (PF) injection 0.5 mg    insulin aspart U-100 pen 0-5 Units    levothyroxine tablet 88 mcg    loperamide capsule 2 mg    magnesium oxide tablet 800 mg    magnesium oxide tablet 800 mg    metoclopramide HCl injection 10 mg    nitroGLYCERIN SL tablet 0.4 mg    ondansetron injection 4 mg    potassium  "chloride 10% oral solution 40 mEq    potassium chloride 10% oral solution 40 mEq    promethazine (PHENERGAN) 6.25 mg in dextrose 5 % 50 mL IVPB    ramelteon tablet 8 mg    senna-docusate 8.6-50 mg per tablet 1 tablet    senna-docusate 8.6-50 mg per tablet 1 tablet    sodium chloride 0.9% flush 3 mL    sodium chloride 0.9% flush 5 mL    sodium chloride 0.9% flush 5 mL     Objective:     Vital Signs (Most Recent):  Temp: 98 °F (36.7 °C) (03/25/19 0900)  Pulse: 72 (03/25/19 0900)  Resp: 18 (03/25/19 0900)  BP: (!) 153/70 (03/25/19 0900)  SpO2: 98 % (03/25/19 0900) Vital Signs (24h Range):  Temp:  [98 °F (36.7 °C)-99.3 °F (37.4 °C)] 98 °F (36.7 °C)  Pulse:  [63-78] 72  Resp:  [16-20] 18  SpO2:  [92 %-98 %] 98 %  BP: (133-159)/(58-72) 153/70     Weight: 63 kg (138 lb 14.2 oz)  Height: 5' 3" (160 cm)  Body mass index is 24.6 kg/m².      Intake/Output Summary (Last 24 hours) at 3/25/2019 1220  Last data filed at 3/25/2019 1130  Gross per 24 hour   Intake 2485 ml   Output 1700 ml   Net 785 ml       General    Nursing note and vitals reviewed.  Constitutional: She is oriented to person, place, and time. She appears well-developed and well-nourished. No distress.   HENT:   Head: Atraumatic.   Eyes: EOM are normal.   Cardiovascular: Normal rate.    Pulmonary/Chest: Effort normal.   Abdominal: Soft.   Neurological: She is alert and oriented to person, place, and time.   Psychiatric: Her behavior is normal.             Right Hip Exam   Right hip exam is normal.   Left Hip Exam     Inspection   Swelling: present  Erythema: absent    Tenderness   The patient tender to palpation of the trochanteric bursa.    Range of Motion   Extension: abnormal   Flexion: abnormal     Tests   Log Roll: positive    Other   Sensation: normal    Comments:  Dsg c/d/i          Vascular Exam       Left Pulses  Dorsalis Pedis:      2+              Significant Labs:   CBC:   Recent Labs   Lab 03/24/19  0539   WBC 7.60   HGB 8.6*   HCT 26.6*   PLT " 127*     CMP:   Recent Labs   Lab 03/24/19  0539 03/25/19  0422    138   K 5.0 4.1    108   CO2 25 24   * 123*   BUN 39* 37*   CREATININE 1.6* 1.4   CALCIUM 8.3* 8.0*   ANIONGAP 6* 6*   EGFRNONAA 29* 34*     Coagulation:   No results for input(s): LABPROT, INR, APTT in the last 48 hours.  All pertinent labs within the past 24 hours have been reviewed.    Significant Imaging: None    Assessment/Plan:     * Closed left hip fracture  Start PT. WBAT LLE with assistive device.  Pain control  Placement          Jorje Hamlin MD  Orthopedics  [facility]

## 2019-03-25 NOTE — PROGRESS NOTES
"[facility] Medicine  Progress Note    Patient Name: Blaire Cage  MRN: [id number]  Patient Class: IP- Inpatient   Admission Date: 3/22/2019  Length of Stay: 3 days  Attending Physician: Gaby Zuñiga MD  Primary Care Provider: Eli Edmonds MD        Subjective:     Principal Problem:Closed left hip fracture    HPI:  Blaire Cage is a 88 y.o. female PMHx includes HTN, HLD, DM, COPD, hip arthritis, PE, DVT, CHF, and an aortic aneurysm.  who presents to the ED via EMS complaining of left leg pain and left hip pain s/p a fall immediately PTA. Pt's daughter states that there were no witnesses to the fall. Pt explains that she has pain in her upper left leg, left hip, and left groin. She states that she has no pain on her right side. The patient denies ankle pain, knee pain, or any other symptoms at this time.  Orthopedic SHx includes a right hip fracture surgery.  Pt was in severe pain on arrival to floor but resolved with small dose of dilaudid-she was out washing car and fell. Friends found her. She denies chest pain or sob or loc and no loss of bowel/bladder control. She is oriented x 3. Daughter present at time of my exam. Pt lives by herself and says, "I am stubborn".     Evaluation in ED revealed subcapital hip fx left and mild anemia.   Impression       Impacted left subcapital femoral neck fracture.  Possible right inferior pubic ramus fracture.  Status post right total hip arthroplasty.  Osteopenia.  Electronically signed by: Teo Hewitt MD  Date: 03/22/2019           Hospital Course:  Patient was admitted to the hospital medicine service for left hip fracture.  Dr. Hamlin with Orthopedics was consulted.  Her Eliquis was held.  Her pain was controlled p.r.n. medications.  She underwent repair of left hip fracture with pinning on 03/23 with Dr. Hamlin.  PT and OT were consulted.  Patient was restarted on her Eliquis on 03/24.  Social Work was consulted for skilled " nursing facility placement.    Interval History:  Patient seen and examined.  Sitting at side of bed.  Bey catheter to be removed today.  Patient reports her pain is controlled at this time.  Has not had a bowel movement yet.  I discussed adding MiraLax to her bowel regimen with her.  Patient is amenable to skilled nursing facility placement and case management has been consulted to assist.  Plan of care discussed with patient and nurse at bedside.    Review of Systems   Constitutional: Positive for fatigue. Negative for activity change, appetite change and fever.   HENT: Negative.    Eyes: Negative.    Respiratory: Negative for chest tightness, shortness of breath and wheezing.    Cardiovascular: Negative for chest pain, palpitations and leg swelling.   Gastrointestinal: Negative for abdominal distention, abdominal pain, blood in stool, diarrhea and vomiting.   Genitourinary: Negative for dysuria and hematuria.   Musculoskeletal: Negative for arthralgias and back pain.   Neurological: Negative for headaches.   Hematological: Negative for adenopathy.   Psychiatric/Behavioral: Negative for confusion.     Objective:     Vital Signs (Most Recent):  Temp: 98.4 °F (36.9 °C) (03/25/19 1145)  Pulse: 82 (03/25/19 1145)  Resp: 18 (03/25/19 1145)  BP: (!) 142/62 (03/25/19 1145)  SpO2: (!) 91 % (03/25/19 1145) Vital Signs (24h Range):  Temp:  [98 °F (36.7 °C)-99.3 °F (37.4 °C)] 98.4 °F (36.9 °C)  Pulse:  [65-82] 82  Resp:  [16-20] 18  SpO2:  [91 %-98 %] 91 %  BP: (138-159)/(62-72) 142/62     Weight: 63 kg (138 lb 14.2 oz)  Body mass index is 24.6 kg/m².    Intake/Output Summary (Last 24 hours) at 3/25/2019 1507  Last data filed at 3/25/2019 1130  Gross per 24 hour   Intake 2485 ml   Output 1700 ml   Net 785 ml      Physical Exam   Constitutional: She is oriented to person, place, and time. She appears well-developed and well-nourished. No distress.   HENT:   Head: Normocephalic and atraumatic.   Eyes: Pupils are equal,  round, and reactive to light.   Neck: Neck supple. No thyromegaly present.   Cardiovascular: Normal rate and regular rhythm. Exam reveals no gallop and no friction rub.   No murmur heard.  Pulmonary/Chest: Effort normal. No respiratory distress. She has no wheezes.   Breath sounds diminished at bases   Abdominal: Soft. Bowel sounds are normal. She exhibits no distension. There is no tenderness. There is no guarding.   Musculoskeletal: Normal range of motion. She exhibits no edema.   Neurological: She is alert and oriented to person, place, and time.   Skin: Skin is warm and dry. No erythema.   Incision c/d/i   Psychiatric: She has a normal mood and affect.       Significant Labs: All pertinent labs within the past 24 hours have been reviewed.    Significant Imaging: I have reviewed and interpreted all pertinent imaging results/findings within the past 24 hours.    Assessment/Plan:      * Closed left hip fracture  - s/p repair  Eliquis restarted 3/24  - pain control  - Diet as tolerated      Hip pain, left        Diastolic CHF, chronic  .  IV fluids today.  Continue home medications.  Remove Bey today.    Anemia due to multiple mechanisms  Monitor and transfuse if he becomes hemodynamically unstable or H/H < 7/21  Pt also has DEREJE-resume po iron and vit c  Trend h/h -have type and screen   Eliquis restarted 3/24        Type 2 diabetes mellitus with kidney complication, without long-term current use of insulin  Patient's FSGs are controlled on current hypoglycemics.   Last A1c reviewed-   Lab Results   Component Value Date    HGBA1C 6.2 (H) 03/22/2019     Most recent fingerstick glucose reviewed-   Recent Labs   Lab 03/24/19  1731 03/24/19  2146 03/25/19  0542 03/25/19  1138   POCTGLUCOSE 167* 170* 120* 175*     Current correctional scale  Low  Maintain anti-hyperglycemic dose as follows-   Antihyperglycemics (From admission, onward)    Start     Stop Route Frequency Ordered    03/22/19 2053  insulin aspart U-100 pen  0-5 Units      -- SubQ Before meals & nightly PRN 03/22/19 1955            Asthma-COPD overlap syndrome  Chronic, stable-on continuous home O2.   Ordered nebs prn   Encourage incentive spirometry    Hypertension associated with diabetes  Chronic, stable  Hypertension Medications             amLODIPine (NORVASC) 10 MG tablet TAKE ONE TABLET BY MOUTH ONCE DAILY    nitroGLYCERIN (NITROSTAT) 0.4 MG SL tablet Place 1 tablet (0.4 mg total) under the tongue every 5 (five) minutes as needed for Chest pain. As needed      Hospital Medications             amLODIPine tablet 10 mg Starting on 3/23/2019. Take 2 tablets (10 mg total) by mouth once daily.    nitroGLYCERIN SL tablet 0.4 mg Place 1 tablet (0.4 mg total) under the tongue every 5 (five) minutes as needed for Chest pain.            CKD (chronic kidney disease), stage III, eGFR 39, progressive 31     CKD (chronic kidney disease) stage 3, GFR 30-59 ml/min renal dose all meds; no nephrotoxic agents     Keep Map > 65   - Cr has improved to baseline  - discontinue IV fluids today.    Diabetic neuropathy, type II diabetes mellitus  Chronic, stable continue gabapentin      VTE Risk Mitigation (From admission, onward)        Ordered     apixaban tablet 2.5 mg  2 times daily      03/24/19 1304     Place JIMMY hose  Until discontinued      03/22/19 1851     Place sequential compression device  Until discontinued      03/22/19 1851     Reason for No Pharmacological VTE Prophylaxis  Once      03/22/19 1851     IP VTE HIGH RISK PATIENT  Once      03/22/19 1851              Gaby Zuñiga MD  Department of Hospital Medicine   [facility]

## 2019-03-25 NOTE — PT/OT/SLP EVAL
"Occupational Therapy   Evaluation    Name: Blaire Cage  MRN: [id number]  Admitting Diagnosis:  Closed left hip fracture 2 Days Post-Op    Recommendations:     Discharge Recommendations: rehabilitation facility  Discharge Equipment Recommendations:  none  Barriers to discharge:  Decreased caregiver support    Assessment:     Blaire Cage is a 88 y.o. female with a medical diagnosis of Closed left hip fracture.  Performance deficits affecting function: weakness, impaired endurance, impaired self care skills, impaired functional mobilty, orthopedic precautions, impaired cardiopulmonary response to activity, pain, decreased lower extremity function, gait instability, impaired balance. Pt presents with limited independence with standing ADLs and functional mobility 2/2 decreased strength in LLE. Pt was able to advance LLE ~2 steps forward and requested to return back to sitting 2/2 increased fatigue and pain in LLE, requiring min A with sit<>stand using RW. Pt was unable to perform bedside commode transfer 2/2 poor endurance with ambulation. Prior to activity, pt's SpO2 was 100 on 2L of O2. Pt removed NC prior to sit<>stand with RW. Post activity, pt's SpO2 was 82. Pt's NC was placed back on pt. After several minutes, pt's SpO2 increased to 93. Nurse notified.      Rehab Prognosis: Good; patient would benefit from acute skilled OT services to address these deficits and reach maximum level of function.       Plan:     Patient to be seen 3 x/week to address the above listed problems via self-care/home management, therapeutic activities, therapeutic exercises  · Plan of Care Expires: 04/08/19  · Plan of Care Reviewed with: patient    Subjective     Chief Complaint: pain in LLE  Patient/Family Comments/goals: "It's hard to use my L leg."    Occupational Profile:  Living Environment: Pt lives alone in [facility].  Previous level of function: Pt was independent with ADLs and mobility.   Equipment Used at Home:  glucometer, " nebulizer, walker, rolling, shower chair  Assistance upon Discharge: Pt will receive limited assist from family and friends.     Pain/Comfort:  · Pain Rating 1: 0/10  · Location - Side 1: Left  · Location - Orientation 1: generalized  · Location 1: hip  · Pain Addressed 1: Reposition, Distraction, Nurse notified, Other (see comments)(Pt reported pain only upon exertion. )  · Pain Rating Post-Intervention 1: 0/10    Patients cultural, spiritual, Oriental orthodox conflicts given the current situation:      Objective:   RN removed pt's catheter several minutes after the start of OT evaluation.     Communicated with: nurse Carmela prior to session.  Patient found up in chair with oxygen, peripheral IV upon OT entry to room.    General Precautions: Standard, fall   Orthopedic Precautions:LLE weight bearing as tolerated   Braces: N/A     Occupational Performance:    Bed Mobility:    · Not assessed; pt seated in chair upon arrival. See PT notes.    Functional Mobility/Transfers:  · Patient completed Sit <> Stand Transfer with minimum assistance  with  rolling walker   · Functional Mobility: Pt was able to take 2 steps with min A and RW from chair but was unable to advance beyond that 2/2 c/o LLE fatigue and pain.     Activities of Daily Living:  · Lower Body Dressing: maximal assistance to don/doff socks while seated in chair.    Cognitive/Visual Perceptual:  Cognitive/Psychosocial Skills:     -       Oriented to: x4   -       Follows Commands/attention:Follows multistep  commands  -       Communication: clear/fluent  -       Safety awareness/insight to disability: impaired   -       Mood/Affect/Coping skills/emotional control: Appropriate to situation  Visual/Perceptual:      -Intact     Physical Exam:  Postural examination/scapula alignment:    -       Rounded shoulders  -       Forward head  -       Posterior pelvic tilt  Upper Extremity Range of Motion:     -       Right Upper Extremity: WFL  -       Left Upper Extremity:  WFL  Upper Extremity Strength:    -       Right Upper Extremity: WFL  -       Left Upper Extremity: WFL   Strength:    -       Right Upper Extremity: WFL  -       Left Upper Extremity: WFL  Fine Motor Coordination:    -       Intact  Gross motor coordination:   WFL in BUE    AMPAC 6 Click ADL:  AMPAC Total Score: 18    Treatment & Education:  OT ed patient on safety with walker use for functional mobility with cues for hand placement & sequencing.   OT ed pt on use of adaptive equipment for LB dressing & safe item retrieval with reacher with demonstration provided.    Education:    Patient left up in chair with call button in reach and nurse present    GOALS:   Multidisciplinary Problems     Occupational Therapy Goals        Problem: Occupational Therapy Goal    Goal Priority Disciplines Outcome Interventions   Occupational Therapy Goal     OT, PT/OT Ongoing (interventions implemented as appropriate)    Description:  Goals to be met by: 4/8/2019    Patient will increase functional independence with ADLs by performing:    LE Dressing with Stand-by Assistance and Assistive Devices as needed.  Grooming while standing at sink with Contact Guard Assistance.  Toileting from bedside commode with Stand-by Assistance for hygiene and clothing management.   Supine to sit with Contact Guard Assistance.  Toilet transfer to bedside commode with Contact Guard Assistance.                      History:     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  8/7/2018    Hip arthritis 3/1/2016    Homocysteinemia 8/7/2018    Hyperlipidemia     Hypertension     Normocytic normochromic anemia 6/29/2018    PE (pulmonary thromboembolism) 06/09/2018    Pneumonia of right lower lobe due to infectious organism 9/11/2017    Thyroid disease     hypothyroid       Past Surgical History:   Procedure Laterality Date    APPENDECTOMY      CHOLECYSTECTOMY      ESOPHAGOGASTRODUODENOSCOPY (EGD) N/A 10/25/2017    Performed by Fadi Flores MD at [facility] ENDO    FRACTURE SURGERY      right hip     HERNIA REPAIR      groin    HYSTERECTOMY      Insertion, Implantable Loop Recorder N/A 12/12/2018    Performed by Ashok Herbert MD at [facility] CATH LAB    VARICOSE VEIN SURGERY         Time Tracking:     OT Date of Treatment: 03/25/19  OT Start Time: 1109  OT Stop Time: 1149  OT Total Time (min): 40 min    Billable Minutes:Evaluation 10  Self Care/Home Management 30    Humberto Guerin, OT  3/25/2019

## 2019-03-25 NOTE — PLAN OF CARE
Problem: Occupational Therapy Goal  Goal: Occupational Therapy Goal  Goals to be met by: 4/8/2019    Patient will increase functional independence with ADLs by performing:    LE Dressing with Stand-by Assistance and Assistive Devices as needed.  Grooming while standing at sink with Contact Guard Assistance.  Toileting from bedside commode with Stand-by Assistance for hygiene and clothing management.   Supine to sit with Contact Guard Assistance.  Toilet transfer to bedside commode with Contact Guard Assistance.    Outcome: Ongoing (interventions implemented as appropriate)  OT evaluation completed today. Goals & care plan established.    Humberto Guerin, OT  3/25/2019

## 2019-03-25 NOTE — PLAN OF CARE
Problem: Physical Therapy Goal  Goal: Physical Therapy Goal  Goals to be met by: 19     Patient will increase functional independence with mobility by performin. Supine to sit with MInimal Assistance  2. Sit to stand transfer with Minimal Assistance  3. Gait  x 50 feet with Contact Guard Assistance using Rolling Walker.   4. Lower extremity exercise program x 20 reps, with assistance as needed     Outcome: Ongoing (interventions implemented as appropriate)  Ambulated with rw and Mod A, WBAT LLE with O2 in place.

## 2019-03-25 NOTE — ASSESSMENT & PLAN NOTE
Monitor and transfuse if he becomes hemodynamically unstable or H/H < 7/21  Pt also has DEREJE-resume po iron and vit c  Trend h/h -have type and screen   Eliquis restarted 3/24

## 2019-03-25 NOTE — PT/OT/SLP PROGRESS
"Physical Therapy Treatment    Patient Name:  Blaire Cage   MRN:  [id number]    Recommendations:     Discharge Recommendations:  rehabilitation facility   Discharge Equipment Recommendations: none   Barriers to discharge: None    Assessment:     Blaire Cage is a 88 y.o. female admitted with a medical diagnosis of Closed left hip fracture.  She presents with the following impairments/functional limitations:  weakness, impaired endurance, impaired self care skills, impaired functional mobilty, decreased safety awareness, gait instability, impaired balance, decreased lower extremity function, orthopedic precautions, pain, impaired cardiopulmonary response to activity .    Rehab Prognosis: Good; patient would benefit from acute skilled PT services to address these deficits and reach maximum level of function.    Recent Surgery: Procedure(s) (LRB):  PINNING, HIP, PERCUTANEOUS (Left) 2 Days Post-Op    Plan:     During this hospitalization, patient to be seen BID to address the identified rehab impairments via gait training, therapeutic activities, therapeutic exercises and progress toward the following goals:    · Plan of Care Expires:  04/07/19    Subjective     Chief Complaint: pain L hip  Patient/Family Comments/goals: to go to rehab  Pain/Comfort:  · Pain Rating 1: other (see comments)(did not rate , "it hurts".)  · Location - Side 1: Left  · Location - Orientation 1: generalized  · Location 1: hip  · Pain Addressed 1: Pre-medicate for activity, Reposition, Nurse notified      Objective:     Communicated with nurse Carmela prior to session.  Patient found up in chair with peripheral IV, oxygen(chair alarm) upon PT entry to room.     General Precautions: Standard, fall   Orthopedic Precautions:LLE weight bearing as tolerated   Braces: N/A     Functional Mobility:  · Bed Mobility:     · Rolling Right: contact guard assistance  · Sit to Supine: maximal assistance  · Transfers:     · Sit to Stand:  maximal assistance " with rolling walker  · Gait: few steps fwd / side steps to HOB with rw and Mod A.       AM-PAC 6 CLICK MOBILITY          Therapeutic Activities and Exercises:   Sit to stand from chair with Max A.   Few steps forward slowly . Transferred onto BSC with Max A and verbal cues for technique.   Performed hygiene task with Max A.   Side stepped to HOB with Mod A. Released walker prematurely.    Sit to supine with Max  A.   Positioned HOB with total A.    Patient left supine with all lines intact, call button in reach, bed alarm on and nurse Carmela present..    GOALS:   Multidisciplinary Problems     Physical Therapy Goals        Problem: Physical Therapy Goal    Goal Priority Disciplines Outcome Goal Variances Interventions   Physical Therapy Goal     PT, PT/OT Ongoing (interventions implemented as appropriate)     Description:  Goals to be met by: 19     Patient will increase functional independence with mobility by performin. Supine to sit with MInimal Assistance  2. Sit to stand transfer with Minimal Assistance  3. Gait  x 50 feet with Contact Guard Assistance using Rolling Walker.   4. Lower extremity exercise program x 20 reps, with assistance as needed                      Time Tracking:     PT Received On: 19  PT Start Time: 1325     PT Stop Time: 1350  PT Total Time (min): 25 min     Billable Minutes: Gait Training 8min, Therapeutic Activity 9min and Therapeutic Exercise 8min    Treatment Type: Treatment  PT/PTA: PTA     PTA Visit Number: 1     Jerri Roman PTA  2019

## 2019-03-25 NOTE — PLAN OF CARE
SNF referral sent through [facility] to [facility] and [facility].      03/25/19 1254   Post-Acute Status   Post-Acute Authorization Placement  (SNF)   Post-Acute Placement Status Referrals Sent

## 2019-03-25 NOTE — ASSESSMENT & PLAN NOTE
Patient's FSGs are controlled on current hypoglycemics.   Last A1c reviewed-   Lab Results   Component Value Date    HGBA1C 6.2 (H) 03/22/2019     Most recent fingerstick glucose reviewed-   Recent Labs   Lab 03/24/19  1731 03/24/19  2146 03/25/19  0542 03/25/19  1138   POCTGLUCOSE 167* 170* 120* 175*     Current correctional scale  Low  Maintain anti-hyperglycemic dose as follows-   Antihyperglycemics (From admission, onward)    Start     Stop Route Frequency Ordered    03/22/19 2053  insulin aspart U-100 pen 0-5 Units      -- SubQ Before meals & nightly PRN 03/22/19 1955

## 2019-03-25 NOTE — PLAN OF CARE
Problem: Fall Injury Risk  Goal: Absence of Fall and Fall-Related Injury  Outcome: Ongoing (interventions implemented as appropriate)  Patient instructed on fall risks and the need to call for assistance at all times. She verbalized understanding to instructions     Problem: Adult Inpatient Plan of Care  Goal: Plan of Care Review  Outcome: Ongoing (interventions implemented as appropriate)  Pt AAOx4. Pt instructed on her plan of treatment.Pt verbalized understanding to instruction. Up to chair  with OT. Pain monitored and well controlled with PRN pain medication. Dressing in place to left hip CDI blood glucose monitored O2 at 2 liters. Pedal pulses +2 Bey removed at 11:30 voided without difficulty bed in low position call light with in reach

## 2019-03-25 NOTE — SUBJECTIVE & OBJECTIVE
Principal Problem:Closed left hip fracture     Interval History: Had a little desat when standing.     Review of patient's allergies indicates:   Allergen Reactions    Carvedilol Other (See Comments)     Bradycardia and syncope    Boniva [ibandronate]     Codeine     Hydralazine analogues     Iodinated contrast- oral and iv dye Hives    Kionex [sodium polystyrene sulfonate] Diarrhea     From encounter 12/11/17 for diarrhea--not true allergy.    Lisinopril     Morphine        Current Facility-Administered Medications   Medication    0.9%  NaCl infusion (for blood administration)    0.9%  NaCl infusion    acetaminophen tablet 650 mg    albuterol-ipratropium 2.5 mg-0.5 mg/3 mL nebulizer solution 3 mL    amLODIPine tablet 10 mg    apixaban tablet 2.5 mg    ascorbic acid (vitamin C) tablet 500 mg    dextrose 50% injection 12.5 g    dextrose 50% injection 25 g    ferrous sulfate EC tablet 325 mg    fluticasone 50 mcg/actuation nasal spray 100 mcg    fluticasone-vilanterol 100-25 mcg/dose diskus inhaler 1 puff    gabapentin capsule 300 mg    glucagon (human recombinant) injection 1 mg    glucose chewable tablet 16 g    glucose chewable tablet 24 g    HYDROcodone-acetaminophen 5-325 mg per tablet 1 tablet    hydromorphone (PF) injection 0.5 mg    insulin aspart U-100 pen 0-5 Units    levothyroxine tablet 88 mcg    loperamide capsule 2 mg    magnesium oxide tablet 800 mg    magnesium oxide tablet 800 mg    metoclopramide HCl injection 10 mg    nitroGLYCERIN SL tablet 0.4 mg    ondansetron injection 4 mg    potassium chloride 10% oral solution 40 mEq    potassium chloride 10% oral solution 40 mEq    promethazine (PHENERGAN) 6.25 mg in dextrose 5 % 50 mL IVPB    ramelteon tablet 8 mg    senna-docusate 8.6-50 mg per tablet 1 tablet    senna-docusate 8.6-50 mg per tablet 1 tablet    sodium chloride 0.9% flush 3 mL    sodium chloride 0.9% flush 5 mL    sodium chloride 0.9% flush 5 mL  "    Objective:     Vital Signs (Most Recent):  Temp: 98 °F (36.7 °C) (03/25/19 0900)  Pulse: 72 (03/25/19 0900)  Resp: 18 (03/25/19 0900)  BP: (!) 153/70 (03/25/19 0900)  SpO2: 98 % (03/25/19 0900) Vital Signs (24h Range):  Temp:  [98 °F (36.7 °C)-99.3 °F (37.4 °C)] 98 °F (36.7 °C)  Pulse:  [63-78] 72  Resp:  [16-20] 18  SpO2:  [92 %-98 %] 98 %  BP: (133-159)/(58-72) 153/70     Weight: 63 kg (138 lb 14.2 oz)  Height: 5' 3" (160 cm)  Body mass index is 24.6 kg/m².      Intake/Output Summary (Last 24 hours) at 3/25/2019 1220  Last data filed at 3/25/2019 1130  Gross per 24 hour   Intake 2485 ml   Output 1700 ml   Net 785 ml       General    Nursing note and vitals reviewed.  Constitutional: She is oriented to person, place, and time. She appears well-developed and well-nourished. No distress.   HENT:   Head: Atraumatic.   Eyes: EOM are normal.   Cardiovascular: Normal rate.    Pulmonary/Chest: Effort normal.   Abdominal: Soft.   Neurological: She is alert and oriented to person, place, and time.   Psychiatric: Her behavior is normal.             Right Hip Exam   Right hip exam is normal.   Left Hip Exam     Inspection   Swelling: present  Erythema: absent    Tenderness   The patient tender to palpation of the trochanteric bursa.    Range of Motion   Extension: abnormal   Flexion: abnormal     Tests   Log Roll: positive    Other   Sensation: normal    Comments:  Dsg c/d/i          Vascular Exam       Left Pulses  Dorsalis Pedis:      2+              Significant Labs:   CBC:   Recent Labs   Lab 03/24/19  0539   WBC 7.60   HGB 8.6*   HCT 26.6*   *     CMP:   Recent Labs   Lab 03/24/19  0539 03/25/19  0422    138   K 5.0 4.1    108   CO2 25 24   * 123*   BUN 39* 37*   CREATININE 1.6* 1.4   CALCIUM 8.3* 8.0*   ANIONGAP 6* 6*   EGFRNONAA 29* 34*     Coagulation:   No results for input(s): LABPROT, INR, APTT in the last 48 hours.  All pertinent labs within the past 24 hours have been " reviewed.    Significant Imaging: None

## 2019-03-25 NOTE — PLAN OF CARE
Problem: Adult Inpatient Plan of Care  Goal: Plan of Care Review  Outcome: Ongoing (interventions implemented as appropriate)  Pt POD 2 L hip pinning. O2 2L per NC. IV fluids infusing per orders. Blood sugar monitored ac/hs. Neuro checks- no deficits noted. Bey in place- dc in AM. SCDs/plexi pulse in place. Minimal pain- main complaint is L heel. Dressing to hip c/d/i. Hourly/Q2hr rounding performed, safety maintained. Bed in lowest position, wheels locked, SR up x2, call light in easy reach. Will continue to monitor.

## 2019-03-25 NOTE — SUBJECTIVE & OBJECTIVE
Interval History:  Patient seen and examined.  Sitting at side of bed.  Bey catheter to be removed today.  Patient reports her pain is controlled at this time.  Has not had a bowel movement yet.  I discussed adding MiraLax to her bowel regimen with her.  Patient is amenable to skilled nursing facility placement and case management has been consulted to assist.  Plan of care discussed with patient and nurse at bedside.    Review of Systems   Constitutional: Positive for fatigue. Negative for activity change, appetite change and fever.   HENT: Negative.    Eyes: Negative.    Respiratory: Negative for chest tightness, shortness of breath and wheezing.    Cardiovascular: Negative for chest pain, palpitations and leg swelling.   Gastrointestinal: Negative for abdominal distention, abdominal pain, blood in stool, diarrhea and vomiting.   Genitourinary: Negative for dysuria and hematuria.   Musculoskeletal: Negative for arthralgias and back pain.   Neurological: Negative for headaches.   Hematological: Negative for adenopathy.   Psychiatric/Behavioral: Negative for confusion.     Objective:     Vital Signs (Most Recent):  Temp: 98.4 °F (36.9 °C) (03/25/19 1145)  Pulse: 82 (03/25/19 1145)  Resp: 18 (03/25/19 1145)  BP: (!) 142/62 (03/25/19 1145)  SpO2: (!) 91 % (03/25/19 1145) Vital Signs (24h Range):  Temp:  [98 °F (36.7 °C)-99.3 °F (37.4 °C)] 98.4 °F (36.9 °C)  Pulse:  [65-82] 82  Resp:  [16-20] 18  SpO2:  [91 %-98 %] 91 %  BP: (138-159)/(62-72) 142/62     Weight: 63 kg (138 lb 14.2 oz)  Body mass index is 24.6 kg/m².    Intake/Output Summary (Last 24 hours) at 3/25/2019 1507  Last data filed at 3/25/2019 1130  Gross per 24 hour   Intake 2485 ml   Output 1700 ml   Net 785 ml      Physical Exam   Constitutional: She is oriented to person, place, and time. She appears well-developed and well-nourished. No distress.   HENT:   Head: Normocephalic and atraumatic.   Eyes: Pupils are equal, round, and reactive to light.    Neck: Neck supple. No thyromegaly present.   Cardiovascular: Normal rate and regular rhythm. Exam reveals no gallop and no friction rub.   No murmur heard.  Pulmonary/Chest: Effort normal. No respiratory distress. She has no wheezes.   Breath sounds diminished at bases   Abdominal: Soft. Bowel sounds are normal. She exhibits no distension. There is no tenderness. There is no guarding.   Musculoskeletal: Normal range of motion. She exhibits no edema.   Neurological: She is alert and oriented to person, place, and time.   Skin: Skin is warm and dry. No erythema.   Incision c/d/i   Psychiatric: She has a normal mood and affect.       Significant Labs: All pertinent labs within the past 24 hours have been reviewed.    Significant Imaging: I have reviewed and interpreted all pertinent imaging results/findings within the past 24 hours.

## 2019-03-25 NOTE — PLAN OF CARE
Per Gaby schwartz medically and they will check pt financially.  Per nely and [facility] referral is under review.       03/25/19 8998   Post-Acute Status   Post-Acute Authorization Placement

## 2019-03-25 NOTE — HOSPITAL COURSE
Patient was admitted to the hospital medicine service for left hip fracture.  Dr. Hamlin with Orthopedics was consulted.  Her Eliquis was held.  Her pain was controlled p.r.n. medications.  She underwent repair of left hip fracture with pinning on 03/23 with Dr. Hamlin.  PT and OT were consulted.  Patient was restarted on her Eliquis on 03/24.  Social Work was consulted for skilled nursing facility placement.  Patient was accepted at [facility] skilled Nursing Facility and was discharged there.    Discharge exam  Awake and alert  Lungs clear, a bit diminished at bases

## 2019-03-25 NOTE — PLAN OF CARE
Met with pt to complete her assessment.  Pt's niece Chloe and her spouse were in the room.  Pt, who is independent with her self care, lives at home, drives, denies home health services and has a SC, glucometer, nebulizer, oxygen that she uses PRN however she does not know how many liters she is on.  Pt is  with 6 living children and does not think she has a MPOA.  Pt's PCP is Dr. Edmonds and insurance is PHN.      Received a consult for SNF.  Provided pt with a list of [facility] star rated nursing facilities in the area.  Pt's first choice is [facility], second choice is [facility] and third choice [facility].  Obtained signature for the disclosure form.  Pt requested that I call her daughter Toya to update her.  Called Toya, [phone] to update her.  Daughter stated that she wanted [facility] to be the second choice and [facility] the third choice.  Daughter denied that pt has a MPOA.  Highly encouraged the daughter to help pt obtain a MPOA and FPOA.         03/25/19 1220   Discharge Assessment   Assessment Type Discharge Planning Assessment   Confirmed/corrected address and phone number on facesheet? Yes   Assessment information obtained from? Patient   Communicated expected length of stay with patient/caregiver yes   Prior to hospitilization cognitive status: Alert/Oriented   Prior to hospitalization functional status: Independent;Assistive Equipment   Current cognitive status: Alert/Oriented   Current Functional Status: Partially Dependent   Lives With alone   Able to Return to Prior Arrangements no   Is patient able to care for self after discharge? No   Who are your caregiver(s) and their phone number(s)?   (daughter Toya Cousin, [phone])   Patient's perception of discharge disposition skilled nursing facility   Readmission Within the Last 30 Days no previous admission in last 30 days   Patient currently being followed by outpatient case management? No   Patient currently receives any other  outside agency services? No   Equipment Currently Used at Home glucometer;nebulizer;oxygen;shower chair   Do you have any problems affording any of your prescribed medications? No  (pharmacy is [facility])   Is the patient taking medications as prescribed? yes   Does the patient have transportation home? Yes   Transportation Anticipated family or friend will provide   Does the patient receive services at the [facility]? No   Discharge Plan A Skilled Nursing Facility   DME Needed Upon Discharge  none   Patient/Family in Agreement with Plan yes

## 2019-03-25 NOTE — PT/OT/SLP PROGRESS
Physical Therapy Treatment    Patient Name:  Blaire Cage   MRN:  [id number]    Recommendations:     Discharge Recommendations:  rehabilitation facility   Discharge Equipment Recommendations: none   Barriers to discharge: None    Assessment:     Blaire Cage is a 88 y.o. female admitted with a medical diagnosis of Closed left hip fracture.  She presents with the following impairments/functional limitations:  weakness, impaired endurance, impaired self care skills, impaired functional mobilty, gait instability, impaired balance, pain, decreased lower extremity function, orthopedic precautions, impaired cardiopulmonary response to activity .    Rehab Prognosis: Good; patient would benefit from acute skilled PT services to address these deficits and reach maximum level of function.    Recent Surgery: Procedure(s) (LRB):  PINNING, HIP, PERCUTANEOUS (Left) 2 Days Post-Op    Plan:     During this hospitalization, patient to be seen BID to address the identified rehab impairments via gait training, therapeutic activities, therapeutic exercises and progress toward the following goals:    · Plan of Care Expires:  04/07/19    Subjective     Chief Complaint: pain L hip with movement  Patient/Family Comments/goals: none stated  Pain/Comfort:  · Pain Rating 1: other (see comments)(did not rate)  · Location - Side 1: Left  · Location - Orientation 1: generalized  · Location 1: hip  · Pain Addressed 1: Pre-medicate for activity, Reposition, Nurse notified      Objective:     Communicated with nurse Carmela prior to session.  Patient found supine with bed alarm, oxygen, peripheral IV, carter catheter upon PT entry to room.     General Precautions: Standard, fall   Orthopedic Precautions:LLE weight bearing as tolerated   Braces: N/A     Functional Mobility:  · Bed Mobility:     · Rolling Left:  maximal assistance  · Supine to Sit: maximal assistance  · Transfers:     · Sit to Stand:  maximal assistance with rolling walker  · Gait:  6' with rw and Mod A , very slowly with decreased step length.      AM-PAC 6 CLICK MOBILITY          Therapeutic Activities and Exercises:   Transferred to sitting EOB with Max A.   Stood with Max A, WBAT LLE with rw.    Ambulated very slowly with forward posture and decreased step length.    Sat up in chair with verbal cues for safe technique.    Patient left up in chair with all lines intact, call button in reach, chair alarm on and nurse Carmela notified..    GOALS:   Multidisciplinary Problems     Physical Therapy Goals        Problem: Physical Therapy Goal    Goal Priority Disciplines Outcome Goal Variances Interventions   Physical Therapy Goal     PT, PT/OT      Description:  Goals to be met by: 19     Patient will increase functional independence with mobility by performin. Supine to sit with MInimal Assistance  2. Sit to stand transfer with Minimal Assistance  3. Gait  x 50 feet with Contact Guard Assistance using Rolling Walker.   4. Lower extremity exercise program x 20 reps, with assistance as needed                      Time Tracking:     PT Received On: 19  PT Start Time: 0915     PT Stop Time: 0935  PT Total Time (min): 20 min     Billable Minutes: Gait Training 12min and Therapeutic Activity 8min    Treatment Type: Treatment  PT/PTA: PTA     PTA Visit Number: 1     Jerri Roman PTA  2019

## 2019-03-25 NOTE — ASSESSMENT & PLAN NOTE
CKD (chronic kidney disease) stage 3, GFR 30-59 ml/min renal dose all meds; no nephrotoxic agents     Keep Map > 65   - Cr has improved to baseline  - discontinue IV fluids today.

## 2019-03-25 NOTE — PLAN OF CARE
03/24/19 2100   Patient Assessment/Suction   Level of Consciousness (AVPU) alert   Respiratory Effort Unlabored   Expansion/Accessory Muscles/Retractions no use of accessory muscles   All Lung Fields Breath Sounds clear   PRE-TX-O2   O2 Device (Oxygen Therapy) nasal cannula   Flow (L/min) 2   SpO2 96 %   Pulse Oximetry Type Intermittent   $ Pulse Oximetry - Multiple Charge Pulse Oximetry - Multiple   Pulse 66   Resp 18   Aerosol Therapy   $ Aerosol Therapy Charges PRN treatment not required

## 2019-03-26 VITALS
HEIGHT: 63 IN | HEART RATE: 71 BPM | RESPIRATION RATE: 20 BRPM | SYSTOLIC BLOOD PRESSURE: 161 MMHG | DIASTOLIC BLOOD PRESSURE: 70 MMHG | OXYGEN SATURATION: 96 % | TEMPERATURE: 97 F | WEIGHT: 138.88 LBS | BODY MASS INDEX: 24.61 KG/M2

## 2019-03-26 LAB
ANION GAP SERPL CALC-SCNC: 5 MMOL/L (ref 8–16)
BUN SERPL-MCNC: 32 MG/DL (ref 8–23)
CALCIUM SERPL-MCNC: 7.9 MG/DL (ref 8.7–10.5)
CHLORIDE SERPL-SCNC: 109 MMOL/L (ref 95–110)
CO2 SERPL-SCNC: 23 MMOL/L (ref 23–29)
CREAT SERPL-MCNC: 1.2 MG/DL (ref 0.5–1.4)
EST. GFR  (AFRICAN AMERICAN): 47 ML/MIN/1.73 M^2
EST. GFR  (NON AFRICAN AMERICAN): 40 ML/MIN/1.73 M^2
GLUCOSE SERPL-MCNC: 110 MG/DL (ref 70–110)
POCT GLUCOSE: 111 MG/DL (ref 70–110)
POCT GLUCOSE: 136 MG/DL (ref 70–110)
POTASSIUM SERPL-SCNC: 3.7 MMOL/L (ref 3.5–5.1)
SODIUM SERPL-SCNC: 137 MMOL/L (ref 136–145)

## 2019-03-26 PROCEDURE — 97530 THERAPEUTIC ACTIVITIES: CPT

## 2019-03-26 PROCEDURE — 80048 BASIC METABOLIC PNL TOTAL CA: CPT

## 2019-03-26 PROCEDURE — 94761 N-INVAS EAR/PLS OXIMETRY MLT: CPT

## 2019-03-26 PROCEDURE — 36415 COLL VENOUS BLD VENIPUNCTURE: CPT

## 2019-03-26 PROCEDURE — 25000003 PHARM REV CODE 250: Performed by: HOSPITALIST

## 2019-03-26 PROCEDURE — 27000221 HC OXYGEN, UP TO 24 HOURS

## 2019-03-26 PROCEDURE — 99900035 HC TECH TIME PER 15 MIN (STAT)

## 2019-03-26 PROCEDURE — 97116 GAIT TRAINING THERAPY: CPT

## 2019-03-26 PROCEDURE — 25000003 PHARM REV CODE 250: Performed by: INTERNAL MEDICINE

## 2019-03-26 PROCEDURE — 94640 AIRWAY INHALATION TREATMENT: CPT

## 2019-03-26 RX ORDER — AMOXICILLIN 250 MG
1 CAPSULE ORAL 2 TIMES DAILY
COMMUNITY
Start: 2019-03-26 | End: 2019-08-06 | Stop reason: SINTOL

## 2019-03-26 RX ORDER — SYRING-NEEDL,DISP,INSUL,0.3 ML 29 G X1/2"
296 SYRINGE, EMPTY DISPOSABLE MISCELLANEOUS ONCE
Status: COMPLETED | OUTPATIENT
Start: 2019-03-26 | End: 2019-03-26

## 2019-03-26 RX ORDER — HYDROCODONE BITARTRATE AND ACETAMINOPHEN 5; 325 MG/1; MG/1
1 TABLET ORAL EVERY 4 HOURS PRN
Qty: 10 TABLET | Refills: 0 | Status: SHIPPED | OUTPATIENT
Start: 2019-03-26 | End: 2019-06-06 | Stop reason: CLARIF

## 2019-03-26 RX ADMIN — MAGNESIUM CITRATE 296 ML: 1.75 LIQUID ORAL at 01:03

## 2019-03-26 RX ADMIN — OXYCODONE HYDROCHLORIDE AND ACETAMINOPHEN 500 MG: 500 TABLET ORAL at 09:03

## 2019-03-26 RX ADMIN — FLUTICASONE PROPIONATE 100 MCG: 50 SPRAY, METERED NASAL at 09:03

## 2019-03-26 RX ADMIN — APIXABAN 2.5 MG: 2.5 TABLET, FILM COATED ORAL at 09:03

## 2019-03-26 RX ADMIN — AMLODIPINE BESYLATE 10 MG: 5 TABLET ORAL at 09:03

## 2019-03-26 RX ADMIN — LEVOTHYROXINE SODIUM 88 MCG: 88 TABLET ORAL at 06:03

## 2019-03-26 RX ADMIN — FLUTICASONE FUROATE AND VILANTEROL TRIFENATATE 1 PUFF: 100; 25 POWDER RESPIRATORY (INHALATION) at 08:03

## 2019-03-26 RX ADMIN — Medication 296 ML: at 09:03

## 2019-03-26 RX ADMIN — DOCUSATE SODIUM AND SENNOSIDES 1 TABLET: 8.6; 5 TABLET, FILM COATED ORAL at 09:03

## 2019-03-26 RX ADMIN — ACETAMINOPHEN 650 MG: 325 TABLET ORAL at 12:03

## 2019-03-26 RX ADMIN — FERROUS SULFATE TAB EC 325 MG (65 MG FE EQUIVALENT) 325 MG: 325 (65 FE) TABLET DELAYED RESPONSE at 09:03

## 2019-03-26 RX ADMIN — MAGNESIUM CITRATE: 1.75 LIQUID ORAL at 04:03

## 2019-03-26 NOTE — NURSING
Report given to Cesilia @ [facility] [phone]. Brown bomb enema started and then was informed that pt does not have to have enema before discharge/transfer to [facility].  Green watery small stool recd, I informed cesilia it was not enough to consider a bowel movement

## 2019-03-26 NOTE — DISCHARGE SUMMARY
"[facility] Medicine  Discharge Summary      Patient Name: Blaire Cage  MRN: [id number]  Admission Date: 3/22/2019  Hospital Length of Stay: 4 days  Discharge Date and Time: 3/26/2019  5:14 PM  Attending Physician: No att. providers found   Discharging Provider: Gaby Zuñiga MD  Primary Care Provider: Eli Edmonds MD      HPI:   Blaire Cage is a 88 y.o. female PMHx includes HTN, HLD, DM, COPD, hip arthritis, PE, DVT, CHF, and an aortic aneurysm.  who presents to the ED via EMS complaining of left leg pain and left hip pain s/p a fall immediately PTA. Pt's daughter states that there were no witnesses to the fall. Pt explains that she has pain in her upper left leg, left hip, and left groin. She states that she has no pain on her right side. The patient denies ankle pain, knee pain, or any other symptoms at this time.  Orthopedic SHx includes a right hip fracture surgery.  Pt was in severe pain on arrival to floor but resolved with small dose of dilaudid-she was out washing car and fell. Friends found her. She denies chest pain or sob or loc and no loss of bowel/bladder control. She is oriented x 3. Daughter present at time of my exam. Pt lives by herself and says, "I am stubborn".     Evaluation in ED revealed subcapital hip fx left and mild anemia.   Impression       Impacted left subcapital femoral neck fracture.  Possible right inferior pubic ramus fracture.  Status post right total hip arthroplasty.  Osteopenia.  Electronically signed by: Teo Hewitt MD  Date: 03/22/2019           Procedure(s) (LRB):  PINNING, HIP, PERCUTANEOUS (Left)      Hospital Course:   Patient was admitted to the hospital medicine service for left hip fracture.  Dr. Hamlin with Orthopedics was consulted.  Her Eliquis was held.  Her pain was controlled p.r.n. medications.  She underwent repair of left hip fracture with pinning on 03/23 with Dr. Hamlin.  PT and OT were consulted.  Patient was " restarted on her Eliquis on 03/24.  Social Work was consulted for skilled nursing facility placement.  Patient was accepted at [facility] and was discharged there.    Discharge exam  Awake and alert  Lungs clear, a bit diminished at bases       Consults:   Consults (From admission, onward)        Status Ordering Provider     Inpatient consult to Orthopedics  Once     Provider:  Jorje Hamlin MD    Completed DEMARCO HUNTER     Inpatient consult to Social Work/Case Management  Once     Provider:  (Not yet assigned)    Completed SHAHRIAR SELBY            Final Active Diagnoses:    Diagnosis Date Noted POA    PRINCIPAL PROBLEM:  Closed left hip fracture [S72.002A] 03/22/2019 Yes    Hip pain, left [M25.552] 03/23/2019 Yes    Diastolic CHF, chronic [I50.32] 09/14/2018 Yes    Anemia due to multiple mechanisms [D64.9] 06/29/2018 Yes    Type 2 diabetes mellitus with kidney complication, without long-term current use of insulin [E11.29] 06/28/2018 Yes    Asthma-COPD overlap syndrome [J44.9] 06/09/2018 Yes    Diabetic neuropathy, type II diabetes mellitus [E11.40] 01/18/2013 Yes    CKD (chronic kidney disease), stage III, eGFR 39, progressive 31 [N18.3] 01/18/2013 Yes    Hypertension associated with diabetes [E11.59, I10] 01/18/2013 Yes      Problems Resolved During this Admission:       Discharged Condition: good    Disposition: Skilled Nursing Facility    Follow Up:  Follow-up Information     Jorje Hamlin MD In 2 weeks.    Specialties:  Sports Medicine, Orthopedic Surgery  Contact information:  [address] [place] [address] [address] Gold [address] [place] [id number]  [phone]             [facility].    Contact information:  [address] [place] [id number] [phone]               Patient Instructions:      Notify your health care provider if you experience any of the following:  temperature >100.4     Notify your health care provider if you  experience any of the following:  persistent nausea and vomiting or diarrhea     Notify your health care provider if you experience any of the following:  severe uncontrolled pain       Significant Diagnostic Studies: Labs:   BMP:   Recent Labs   Lab 03/25/19  0422 03/26/19  0446   * 110    137   K 4.1 3.7    109   CO2 24 23   BUN 37* 32*   CREATININE 1.4 1.2   CALCIUM 8.0* 7.9*   , CMP   Recent Labs   Lab 03/25/19  0422 03/26/19  0446    137   K 4.1 3.7    109   CO2 24 23   * 110   BUN 37* 32*   CREATININE 1.4 1.2   CALCIUM 8.0* 7.9*   ANIONGAP 6* 5*   ESTGFRAFRICA 39* 47*   EGFRNONAA 34* 40*    and CBC No results for input(s): WBC, HGB, HCT, PLT in the last 48 hours.    Pending Diagnostic Studies:     None       Radiology Results (last 7 days)     Procedure Component Value Units Date/Time   X-Ray Hip 2 or 3 views Left [[id number]] Resulted: 03/23/19 1229   Order Status: Completed Updated: 03/23/19 1231   Narrative:     EXAMINATION:  XR HIP 2 VIEW LEFT    CLINICAL HISTORY:  surgery;    TECHNIQUE:  AP view of the pelvis and frog leg lateral view of the left hip were performed.    COMPARISON:  03/22/2019    FINDINGS:  There has been ORIF of the left hip with 3 partially threaded cannulated lag screws which are intact and engaged.  Good alignment is been achieved.   Impression:       Status post ORIF of a left femoral neck fracture with good alignment.      Electronically signed by: Teo Hewitt MD  Date: 03/23/2019  Time: 12:29   SURG FL Surgery Fluoro Usage [[id number]] Resulted: 03/23/19 1109   Order Status: Completed Updated: 03/23/19 1109   Narrative:     See OP Notes for results.    Impression:     See OP Notes for results.             This procedure was auto-finalized by: Virtual Radiologist     X-Ray Chest AP Portable [[id number]] Resulted: 03/22/19 1525   Order Status: Completed Updated: 03/22/19 1528   Narrative:     EXAMINATION:  XR CHEST AP PORTABLE    CLINICAL  HISTORY:  Encounter for preprocedural cardiovascular examination    TECHNIQUE:  Single frontal view of the chest was performed.    COMPARISON:  09/15/2018 the heart is enlarged.  Cardiac loop recorder is present.    FINDINGS:  Atelectasis is present at the right cardiophrenic angle as before.  There is no consolidation or pleural effusion.  There is eventration left hemidiaphragm.  Surgical clips are present the right axilla.   Impression:       Cardiomegaly, cardiac loop recorder, and right basilar atelectasis.      Electronically signed by: Teo Hewitt MD  Date: 03/22/2019  Time: 15:25   X-Ray Hip 2 View Left [[id number]] Resulted: 03/22/19 1420   Order Status: Completed Updated: 03/22/19 1423   Narrative:     EXAMINATION:  XR HIP 2 VIEW LEFT    CLINICAL HISTORY:  Pain in left hip    TECHNIQUE:  AP view of the pelvis and frog leg lateral view of the left hip were performed.    COMPARISON:  None    FINDINGS:  There is an impacted subcapital left femoral neck fracture without significant displacement or angulation.  There has been a right total hip arthroplasty.  The bones are osteopenic.  Degenerative change of the visualized lumbar spine is present.  There is a possible fracture of the right inferior pubic ramus on the AP pelvis view.   Impression:       Impacted left subcapital femoral neck fracture.    Possible right inferior pubic ramus fracture.    Status post right total hip arthroplasty.    Osteopenia.      Electronically signed by: Teo Hewitt MD  Date: 03/22/2019  Time: 14:20       Medications:  Reconciled Home Medications:      Medication List      START taking these medications    HYDROcodone-acetaminophen 5-325 mg per tablet  Commonly known as:  NORCO  Take 1 tablet by mouth every 4 (four) hours as needed.     senna-docusate 8.6-50 mg 8.6-50 mg per tablet  Commonly known as:  PERICOLACE  Take 1 tablet by mouth 2 (two) times daily.        CHANGE how you take these medications    nitroGLYCERIN  0.4 MG SL tablet  Commonly known as:  NITROSTAT  Place 1 tablet (0.4 mg total) under the tongue every 5 (five) minutes as needed for Chest pain. As needed  What changed:  additional instructions        CONTINUE taking these medications    acetaminophen 325 MG tablet  Commonly known as:  TYLENOL  Take 2 tablets (650 mg total) by mouth every 4 (four) hours as needed.     albuterol-ipratropium 2.5 mg-0.5 mg/3 mL nebulizer solution  Commonly known as:  DUO-NEB  USE ONE VIAL IN NEBULIZER EVERY 6 HOURS WHILE AWAKE     amLODIPine 10 MG tablet  Commonly known as:  NORVASC  TAKE ONE TABLET BY MOUTH ONCE DAILY     aspirin 81 MG EC tablet  Commonly known as:  ECOTRIN  Take 81 mg by mouth once daily.     calcium carbonate 500 mg calcium (1,250 mg) tablet  Commonly known as:  OS-TASIA  Take 1 tablet by mouth 2 (two) times daily.     ELIQUIS 5 mg Tab  Generic drug:  apixaban  TAKE 1 TABLET BY MOUTH TWICE DAILY     ferrous sulfate 325 mg (65 mg iron) Tab tablet  Commonly known as:  FEOSOL  Take 1 tablet (325 mg total) by mouth 2 (two) times daily with meals.     fish oil-omega-3 fatty acids 300-1,000 mg capsule  Take 2 g by mouth every evening.     fluticasone 50 mcg/actuation nasal spray  Commonly known as:  FLONASE  2 sprays by Each Nare route once daily.     fluticasone-salmeterol 232-14 mcg/actuation Aepb  Inhale 1 puff into the lungs 2 (two) times daily.     folic acid-vit B6-vit B12 2.5-25-2 mg 2.5-25-2 mg Tab  Commonly known as:  FOLBIC or Equiv  Take 1 tablet by mouth once daily.     gabapentin 300 MG capsule  Commonly known as:  NEURONTIN  TAKE ONE CAPSULE BY MOUTH IN THE EVENING     levothyroxine 88 MCG tablet  Commonly known as:  SYNTHROID  Take 1 tablet (88 mcg total) by mouth before breakfast.     magnesium oxide 400 mg (241.3 mg magnesium) tablet  Commonly known as:  MAG-OX  TAKE ONE TABLET BY MOUTH ONCE DAILY     montelukast 10 mg tablet  Commonly known as:  SINGULAIR  Take 1 tablet (10 mg total) by mouth every  evening.     multivitamin tablet  Commonly known as:  THERAGRAN  Take 1 tablet by mouth once daily.     polyethylene glycol 17 gram Pwpk  Commonly known as:  GLYCOLAX  Take 17 g by mouth 2 (two) times daily.     simvastatin 40 MG tablet  Commonly known as:  ZOCOR  TAKE ONE TABLET BY MOUTH ONCE DAILY IN THE EVENING     SITagliptin 25 MG Tab  Commonly known as:  JANUVIA  Take 1 tablet (25 mg total) by mouth once daily.     VITAMIN C 500 MG tablet  Generic drug:  ascorbic acid (vitamin C)  Take 500 mg by mouth once daily.     vitamin D 1000 units Tab  Commonly known as:  VITAMIN D3  Take 1 tablet (1,000 Units total) by mouth once daily.        STOP taking these medications    predniSONE 20 MG tablet  Commonly known as:  DELTASONE            Indwelling Lines/Drains at time of discharge:   Lines/Drains/Airways          None          Time spent on the discharge of patient: 30 minutes  Patient was seen and examined on the date of discharge and determined to be suitable for discharge.         Gaby Zuñiga MD  Department of Hospital Medicine  [facility]

## 2019-03-26 NOTE — PLAN OF CARE
Received pt's 142.  Sent the 142 and PASRR to [facility] through [facility].      3:15 p.m. - refaxed [facility] pt's updated AVS.  Spoke to Isi at [facility], [phone] ext [phone] requesting auth for SNF at [facility].  Faxed her the CM consult for SNF per her request.      3:45 p.m. - Isi at [facility] provided auth for SNF # [id number], case # [id number].  Updated Nayeli at [facility].     4:30 p.m. - Nayeli at [facility] states they need weight bearing status, request that the 24 hours PPD be read and okay to call report to Cesilia at station one.  I updated Anahi, pt's nurse and she stated that she spoke to Cesilia at [facility] who reported okay for pt to be at their facility around 6 p.m.   Plan for Anahi to send the read PPD to [facility].  I updated Nayeli at [facility].        03/26/19 7698   Discharge Reassessment   Assessment Type Discharge Planning Reassessment

## 2019-03-26 NOTE — PT/OT/SLP PROGRESS
Physical Therapy Treatment    Patient Name:  Blaire Cage   MRN:  [id number]    Recommendations:     Discharge Recommendations:  rehabilitation facility   Discharge Equipment Recommendations: none   Barriers to discharge: None    Assessment:     Blaire Cage is a 88 y.o. female admitted with a medical diagnosis of Closed left hip fracture.  She presents with the following impairments/functional limitations:  weakness, impaired endurance, impaired self care skills, impaired functional mobilty, gait instability, impaired balance, decreased safety awareness, pain, decreased ROM, decreased lower extremity function, orthopedic precautions, impaired cardiopulmonary response to activity Activity limited due to pain and SOB with activity..    Rehab Prognosis: Good; patient would benefit from acute skilled PT services to address these deficits and reach maximum level of function.    Recent Surgery: Procedure(s) (LRB):  PINNING, HIP, PERCUTANEOUS (Left) 3 Days Post-Op    Plan:     During this hospitalization, patient to be seen BID to address the identified rehab impairments via gait training, therapeutic activities and progress toward the following goals:    · Plan of Care Expires:  04/07/19    Subjective     Chief Complaint: pain with movement.  Patient/Family Comments/goals: to go to rehab facility.  Pain/Comfort:  · Pain Rating 1: other (see comments)(did not rate)  · Location - Side 1: Left  · Location - Orientation 1: generalized  · Location 1: hip  · Pain Addressed 1: Reposition, Nurse notified      Objective:     Communicated with nurse Christian prior to session.  Patient found up in chair with oxygen upon PT entry to room.     General Precautions: Standard, fall   Orthopedic Precautions:LLE weight bearing as tolerated   Braces: N/A     Functional Mobility:  · Bed Mobility:     · Rolling Right: minimum assistance  · Sit to Supine: maximal assistance  · Transfers:     · Sit to Stand:  maximal assistance with  rolling walker  · Gait: few steps to BSC , few steps to bed following.      AM-PAC 6 CLICK MOBILITY          Therapeutic Activities and Exercises:   Ambulated to chair with rw and Max A with extra time due to pain.   On / off commode with Max A. ( required A to clean )   Ambulated to bed with verbal cues to keep hands on walker ( releases early).    Transferred BTB with A.    Patient left supine with all lines intact, call button in reach, bed alarm on, nurse Anahi notified and LATRICE Haines present..    GOALS:   Multidisciplinary Problems     Physical Therapy Goals        Problem: Physical Therapy Goal    Goal Priority Disciplines Outcome Goal Variances Interventions   Physical Therapy Goal     PT, PT/OT Ongoing (interventions implemented as appropriate)     Description:  Goals to be met by: 19     Patient will increase functional independence with mobility by performin. Supine to sit with MInimal Assistance  2. Sit to stand transfer with Minimal Assistance  3. Gait  x 50 feet with Contact Guard Assistance using Rolling Walker.   4. Lower extremity exercise program x 20 reps, with assistance as needed                      Time Tracking:     PT Received On: 19  PT Start Time: 1336     PT Stop Time: 1355  PT Total Time (min): 19 min     Billable Minutes: Gait Training 11min and Therapeutic Activity 8min    Treatment Type: Treatment  PT/PTA: PTA     PTA Visit Number: 2     Jerri Roman PTA  2019

## 2019-03-26 NOTE — PLAN OF CARE
Attempted to call in pt's LOCET.  Was informed someone would call me back.  Nayeli at [facility] reports that they can admit pt.      1:05 p.m. - [facility] and [facility] notified that pt has chosen an alternate facility.      1:20 p.m. - faxed [facility] through [facility] pt's AVS, discharge orders, PPD, sign & symptom and hard script.  Will fax the [facility] and PASRR when available.  Pt needs to have BM prior to discharge.     1:25 p.m. - LOCET called to [facility].  PASRR faxed to [facility].       03/26/19 3306   Discharge Reassessment   Assessment Type Discharge Planning Reassessment

## 2019-03-26 NOTE — PLAN OF CARE
Attempted to call in pt's LOCET.  Was informed someone would call me back.      Spoke to pt's daughter Ramonita who verified they would like pt to go to [facility].  Updated her that I was still waiting for Nayeli to check pt's financials and okay the admission.  Left message for Nayeli at [facility] to call me.       03/26/19 1059   Discharge Reassessment   Assessment Type Discharge Planning Reassessment

## 2019-03-26 NOTE — PT/OT/SLP PROGRESS
Physical Therapy Treatment    Patient Name:  Blaire Cage   MRN:  [id number]    Recommendations:     Discharge Recommendations:  rehabilitation facility   Discharge Equipment Recommendations: none   Barriers to discharge: None    Assessment:     Blaire Cage is a 88 y.o. female admitted with a medical diagnosis of Closed left hip fracture.  She presents with the following impairments/functional limitations:  weakness, impaired endurance, impaired self care skills, impaired functional mobilty, gait instability, impaired balance, decreased lower extremity function, decreased safety awareness, pain, orthopedic precautions .Patient reported SOB during activity with O2 in place. SpO2 @ 94 % , nurse arrived to assess.    Rehab Prognosis: Good; patient would benefit from acute skilled PT services to address these deficits and reach maximum level of function.    Recent Surgery: Procedure(s) (LRB):  PINNING, HIP, PERCUTANEOUS (Left) 3 Days Post-Op    Plan:     During this hospitalization, patient to be seen BID to address the identified rehab impairments via gait training, therapeutic exercises and progress toward the following goals:    · Plan of Care Expires:  04/07/19    Subjective     Chief Complaint: pain L hip with movement.  Patient/Family Comments/goals: to go to rehab facility  Pain/Comfort:  · Pain Rating 1: other (see comments)(did not rate)  · Location - Side 1: Left  · Location - Orientation 1: generalized  · Location 1: hip  · Pain Addressed 1: Pre-medicate for activity, Reposition, Nurse notified      Objective:     Communicated with nurse Christian prior to session.  Patient found supine with oxygen upon PT entry to room.     General Precautions: Standard, fall   Orthopedic Precautions:LLE weight bearing as tolerated   Braces: N/A     Functional Mobility:  · Bed Mobility:     · Rolling Left:  minimum assistance  · Supine to Sit: maximal assistance  · Transfers:     · Sit to Stand:  maximal assistance  with rolling walker  · Gait: 6 steps with rw and Mod A.      AM-PAC 6 CLICK MOBILITY          Therapeutic Activities and Exercises:   Stood with rw and Max A.   Ambulated few steps with decreased step length.    Sat in chair due to fatigue.   Ambulated to bedside commode with rw , Mod A and verbal cues to sit.   Unable to void . Reported SOB while seated and appeared weak.   [place] [facility] [place] Max A. Nurse arrived to assess.   Patient reported feeling tired but no longer SOB.    Patient left up in chair with all lines intact, call button in reach, chair alarm on and nurse Anahi present..    GOALS:   Multidisciplinary Problems     Physical Therapy Goals        Problem: Physical Therapy Goal    Goal Priority Disciplines Outcome Goal Variances Interventions   Physical Therapy Goal     PT, PT/OT Ongoing (interventions implemented as appropriate)     Description:  Goals to be met by: 19     Patient will increase functional independence with mobility by performin. Supine to sit with MInimal Assistance  2. Sit to stand transfer with Minimal Assistance  3. Gait  x 50 feet with Contact Guard Assistance using Rolling Walker.   4. Lower extremity exercise program x 20 reps, with assistance as needed                      Time Tracking:     PT Received On: 19  PT Start Time: 935     PT Stop Time: 955  PT Total Time (min): 20 min     Billable Minutes: Gait Training 8min and Therapeutic Activity 12min    Treatment Type: Treatment  PT/PTA: PTA     PTA Visit Number: 2     Jerri Roman PTA  2019

## 2019-03-26 NOTE — PROGRESS NOTES
03/26/19 0849   Patient Assessment/Suction   Level of Consciousness (AVPU) alert   Respiratory Effort Normal;Unlabored   All Lung Fields Breath Sounds coarse   PRE-TX-O2   O2 Device (Oxygen Therapy) nasal cannula   $ Is the patient on Low Flow Oxygen? Yes   Flow (L/min) 2  (increased to 3)   Oxygen Concentration (%) 28   SpO2 (!) 90 %   Pulse Oximetry Type Intermittent   $ Pulse Oximetry - Multiple Charge Pulse Oximetry - Multiple   Pulse 79   Resp 18   Aerosol Therapy   $ Aerosol Therapy Charges PRN treatment not required   Respiratory Treatment Status (SVN) PRN treatment not required   Inhaler   $ Inhaler Charges MDI (Metered Dose Inahler) Treatment   Respiratory Treatment Status (Inhaler) given   Treatment Route (Inhaler) mouthpiece   Patient Position (Inhaler) Shetty's   Post Treatment Assessment (Inhaler) breath sounds unchanged   Signs of Intolerance (Inhaler) none   Breath Sounds Post-Respiratory Treatment   Throughout All Fields Post-Treatment no change   Post-treatment Heart Rate (beats/min) 80   Post-treatment Resp Rate (breaths/min) 18   Breo QD, Duo Q6PRN, tolerates inhaler well, vitals as charted.

## 2019-03-26 NOTE — PLAN OF CARE
03/25/19 2026   Patient Assessment/Suction   Level of Consciousness (AVPU) alert   Respiratory Effort Unlabored   Expansion/Accessory Muscles/Retractions no use of accessory muscles   All Lung Fields Breath Sounds wheezes, expiratory   PRE-TX-O2   O2 Device (Oxygen Therapy) nasal cannula   Flow (L/min) 2   SpO2 (!) 91 %   Pulse Oximetry Type Intermittent   Pulse 74   Resp 18   Aerosol Therapy   $ Aerosol Therapy Charges Aerosol Treatment   Respiratory Treatment Status (SVN) given   Treatment Route (SVN) mask   Patient Position (SVN) HOB elevated   Signs of Intolerance (SVN) none   Breath Sounds Post-Respiratory Treatment   Throughout All Fields Post-Treatment All Fields   Throughout All Fields Post-Treatment aeration increased   Post-treatment Heart Rate (beats/min) 78   Post-treatment Resp Rate (breaths/min) 18

## 2019-03-26 NOTE — DISCHARGE INSTRUCTIONS
[facility] Transfer Orders        [facility]    Diagnoses:   Active Hospital Problems    Diagnosis  POA    *Closed left hip fracture [S72.002A]  Yes    Hip pain, left [M25.552]  Yes    Diastolic CHF, chronic [I50.32]  Yes    Anemia due to multiple mechanisms [D64.9]  Yes    Type 2 diabetes mellitus with kidney complication, without long-term current use of insulin [E11.29]  Yes    Asthma-COPD overlap syndrome [J44.9]  Yes    Diabetic neuropathy, type II diabetes mellitus [E11.40]  Yes    CKD (chronic kidney disease), stage III, eGFR 39, progressive 31 [N18.3]  Yes    Hypertension associated with diabetes [E11.59, I10]  Yes      Resolved Hospital Problems   No resolved problems to display.     Allergies:   Review of patient's allergies indicates:   Allergen Reactions    Carvedilol Other (See Comments)     Bradycardia and syncope    Boniva [ibandronate]     Codeine     Hydralazine analogues     Iodinated contrast- oral and iv dye Hives    Kionex [sodium polystyrene sulfonate] Diarrhea     From encounter 12/11/17 for diarrhea--not true allergy.    Lisinopril     Morphine        Code Status: Full    Vitals: Routine       Diet: diabetic diet: 2000 calorie    Activity: Activity as tolerated    Nursing Precautions: Fall      Consults: PT to evaluate and treat- 5 times a week and OT to evaluate and treat- 5 times a week    Oxygen: 2% O2 via nasal cannula    Dialysis: Patient is not on dialysis.     Labs: none  Pending Diagnostic Studies:     None        Imaging: none    Miscellaneous Care:   Diabetes Care: Diabetes: Check blood sugar. Fingerstick blood sugar AC and HS  Sliding Scale/Hypoglycemia Protocol: For FSG<80, give 1 amp D50 or 1 glucose tablet. For FSG , do nothing. For -200, give 1 unit of novolog in addition to pre-meal insulin. For -250, give 2 units of novolog in addition to pre-meal insulin. For -300, give 3 units of  novolog in addition to pre-meal insulin. For 301-350, give 4 units of novolog in addition to pre-meal insulin. For 351-400, give 5 units of novolog in addition to pre-meal insulin. For FSG >400, give 5 units of novolog in addition to pre-meal insulin and please call MD    IV Access: Peripheral     Medications: Discontinue all previous medication orders, if any. See new list below.  Current Discharge Medication List      START taking these medications    Details   HYDROcodone-acetaminophen (NORCO) 5-325 mg per tablet Take 1 tablet by mouth every 4 (four) hours as needed.  Qty: 10 tablet, Refills: 0      senna-docusate 8.6-50 mg (PERICOLACE) 8.6-50 mg per tablet Take 1 tablet by mouth 2 (two) times daily.         CONTINUE these medications which have NOT CHANGED    Details   acetaminophen (TYLENOL) 325 MG tablet Take 2 tablets (650 mg total) by mouth every 4 (four) hours as needed.  Refills: 0      albuterol-ipratropium (DUO-NEB) 2.5 mg-0.5 mg/3 mL nebulizer solution USE ONE VIAL IN NEBULIZER EVERY 6 HOURS WHILE AWAKE  Qty: 120 vial, Refills: 5    Comments: Please consider 90 day supplies to promote better adherence  Associated Diagnoses: Other emphysema      amLODIPine (NORVASC) 10 MG tablet TAKE ONE TABLET BY MOUTH ONCE DAILY  Qty: 90 tablet, Refills: 3      ascorbic acid (VITAMIN C) 500 MG tablet Take 500 mg by mouth once daily.        aspirin (ECOTRIN) 81 MG EC tablet Take 81 mg by mouth once daily.        calcium carbonate (OS-TASIA) 500 mg calcium (1,250 mg) tablet Take 1 tablet by mouth 2 (two) times daily.        ELIQUIS 5 mg Tab TAKE 1 TABLET BY MOUTH TWICE DAILY  Qty: 60 tablet, Refills: 5    Comments: Please consider 90 day supplies to promote better adherence      ferrous sulfate (FEOSOL) 325 mg (65 mg iron) Tab tablet Take 1 tablet (325 mg total) by mouth 2 (two) times daily with meals.  Qty: 180 tablet, Refills: 3    Associated Diagnoses: Iron deficiency anemia due to chronic blood loss      fish  oil-omega-3 fatty acids 300-1,000 mg capsule Take 2 g by mouth every evening.       fluticasone (FLONASE) 50 mcg/actuation nasal spray 2 sprays by Each Nare route once daily.  Qty: 48 g, Refills: 3    Associated Diagnoses: Chronic sinus complaints      fluticasone-salmeterol 232-14 mcg/actuation AePB Inhale 1 puff into the lungs 2 (two) times daily.  Qty: 1 each, Refills: 11    Associated Diagnoses: Asthma-COPD overlap syndrome      folic acid-vit B6-vit B12 2.5-25-2 mg (FOLBIC OR EQUIV) 2.5-25-2 mg Tab Take 1 tablet by mouth once daily.  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  Qty: 90 tablet, Refills: 3      magnesium oxide (MAG-OX) 400 mg tablet TAKE ONE TABLET BY MOUTH ONCE DAILY  Qty: 90 tablet, Refills: 3      montelukast (SINGULAIR) 10 mg tablet Take 1 tablet (10 mg total) by mouth every evening.  Qty: 90 tablet, Refills: 3    Associated Diagnoses: Mild intermittent asthmatic bronchitis without complication; Chronic sinus complaints      multivitamin (THERAGRAN) tablet Take 1 tablet by mouth once daily.      nitroGLYCERIN (NITROSTAT) 0.4 MG SL tablet Place 1 tablet (0.4 mg total) under the tongue every 5 (five) minutes as needed for Chest pain. As needed  Qty: 30 tablet, Refills: 3    Associated Diagnoses: Essential hypertension      polyethylene glycol (GLYCOLAX) 17 gram PwPk Take 17 g by mouth 2 (two) times daily.  Qty: 60 packet, Refills: 0      simvastatin (ZOCOR) 40 MG tablet TAKE ONE TABLET BY MOUTH ONCE DAILY IN THE EVENING  Qty: 90 tablet, Refills: 3      SITagliptin (JANUVIA) 25 MG Tab Take 1 tablet (25 mg total)  by mouth once daily.  Qty: 30 tablet, Refills: 5      vitamin D 1000 units Tab Take 1 tablet (1,000 Units total) by mouth once daily.  Qty: 90 tablet, Refills: 3         STOP taking these medications       predniSONE (DELTASONE) 20 MG tablet Comments:   Reason for Stopping:             Follow up:   Follow-up Information     Jorje Hamlin MD In 2 weeks.    Specialties:  Sports Medicine, Orthopedic Surgery  Contact information:  [address] [place] [address] [address]  [place] [id number] [phone]

## 2019-03-26 NOTE — PLAN OF CARE
Problem: Physical Therapy Goal  Goal: Physical Therapy Goal  Goals to be met by: 19     Patient will increase functional independence with mobility by performin. Supine to sit with MInimal Assistance  2. Sit to stand transfer with Minimal Assistance  3. Gait  x 50 feet with Contact Guard Assistance using Rolling Walker.   4. Lower extremity exercise program x 20 reps, with assistance as needed     Outcome: Ongoing (interventions implemented as appropriate)  Therapeutic activity : bed mobility, Transfers, gait with rw and Mod A.

## 2019-03-27 ENCOUNTER — TELEPHONE (OUTPATIENT)
Dept: MEDSURG UNIT | Facility: HOSPITAL | Age: 84
End: 2019-03-27

## 2019-04-03 ENCOUNTER — TELEPHONE (OUTPATIENT)
Dept: FAMILY MEDICINE | Facility: CLINIC | Age: 84
End: 2019-04-03

## 2019-04-03 NOTE — TELEPHONE ENCOUNTER
----- Message from Nicol White sent at 4/3/2019  1:04 PM CDT -----  Please call Toya Hendrickson/ [phone] return call for Yaquelin

## 2019-04-03 NOTE — TELEPHONE ENCOUNTER
----- Message from Mela Boggs sent at 4/3/2019 11:59 AM CDT -----  Contact: patient/wendy  Type:  Sooner Apoointment Request    Caller is requesting a sooner appointment.  Caller declined first available appointment listed below.  Caller will not accept being placed on the waitlist and is requesting a message be sent to doctor.    Name of Caller: deann  When is the first available appointment?  7/18  Symptoms:  na  Best Call Back Number:  [phone]  Additional Information:  Patient broke her hip and is in [facility] rehab. Please call to reschedule and advise. Thanks!

## 2019-04-05 ENCOUNTER — TELEPHONE (OUTPATIENT)
Dept: FAMILY MEDICINE | Facility: CLINIC | Age: 84
End: 2019-04-05

## 2019-04-05 NOTE — TELEPHONE ENCOUNTER
----- Message from Ree Hunter sent at 4/5/2019  9:18 AM CDT -----  Type: Needs Medical Advice    Who Called:  Gaby Tyler   Symptoms (please be specific):    How long has patient had these symptoms:  Pharmacy name and phone #:    Best Call Back Number: [phone] Additional Information: The nurse called stating the patient doesn't need to see a doctor for hospital follow-up. Patient is in skilled nursing and is seen by a doctor in the unit. The nurse asking for this to be documented in the patient's chart.

## 2019-04-09 DIAGNOSIS — M25.552 LEFT HIP PAIN: Primary | ICD-10-CM

## 2019-04-11 ENCOUNTER — OFFICE VISIT (OUTPATIENT)
Dept: ORTHOPEDICS | Facility: CLINIC | Age: 84
End: 2019-04-11
Payer: MEDICARE

## 2019-04-11 ENCOUNTER — HOSPITAL ENCOUNTER (OUTPATIENT)
Dept: RADIOLOGY | Facility: HOSPITAL | Age: 84
Discharge: HOME | End: 2019-04-11
Attending: ORTHOPAEDIC SURGERY
Payer: MEDICARE

## 2019-04-11 VITALS
SYSTOLIC BLOOD PRESSURE: 173 MMHG | BODY MASS INDEX: 24.45 KG/M2 | HEIGHT: 63 IN | WEIGHT: 138 LBS | HEART RATE: 68 BPM | DIASTOLIC BLOOD PRESSURE: 74 MMHG

## 2019-04-11 DIAGNOSIS — M25.552 LEFT HIP PAIN: ICD-10-CM

## 2019-04-11 DIAGNOSIS — S72.002D CLOSED FRACTURE OF LEFT HIP WITH ROUTINE HEALING, SUBSEQUENT ENCOUNTER: Primary | ICD-10-CM

## 2019-04-11 PROCEDURE — 73502 X-RAY EXAM HIP UNI 2-3 VIEWS: CPT | Mod: TC,PN,LT

## 2019-04-11 PROCEDURE — 73502 X-RAY EXAM HIP UNI 2-3 VIEWS: CPT | Mod: 26,LT,, | Performed by: RADIOLOGY

## 2019-04-11 PROCEDURE — 99024 PR POST-OP FOLLOW-UP VISIT: ICD-10-PCS | Mod: S$GLB,,, | Performed by: ORTHOPAEDIC SURGERY

## 2019-04-11 PROCEDURE — 99024 POSTOP FOLLOW-UP VISIT: CPT | Mod: S$GLB,,, | Performed by: ORTHOPAEDIC SURGERY

## 2019-04-11 PROCEDURE — 73502 XR HIP 2 VIEW LEFT: ICD-10-PCS | Mod: 26,LT,, | Performed by: RADIOLOGY

## 2019-04-11 PROCEDURE — 99999 PR PBB SHADOW E&M-EST. PATIENT-LVL III: CPT | Mod: PBBFAC,,, | Performed by: ORTHOPAEDIC SURGERY

## 2019-04-11 PROCEDURE — 99999 PR PBB SHADOW E&M-EST. PATIENT-LVL III: ICD-10-PCS | Mod: PBBFAC,,, | Performed by: ORTHOPAEDIC SURGERY

## 2019-04-11 NOTE — PROGRESS NOTES
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 8/7/2018    Hip arthritis 3/1/2016    Homocysteinemia 8/7/2018    Hyperlipidemia     Hypertension     Normocytic normochromic anemia 6/29/2018    PE (pulmonary thromboembolism) 06/09/2018    Pneumonia of right lower lobe due to infectious organism 9/11/2017    Thyroid disease     hypothyroid       Past Surgical History:   Procedure Laterality Date    APPENDECTOMY      CHOLECYSTECTOMY      ESOPHAGOGASTRODUODENOSCOPY (EGD) N/A 10/25/2017    Performed by [facility] ENDO    FRACTURE SURGERY      right hip     HERNIA REPAIR      groin    HYSTERECTOMY      Insertion, Implantable Loop Recorder N/A 12/12/2018    Performed by Ashok Herbert MD at [facility] CATH LAB    PINNING, HIP, PERCUTANEOUS Left 3/23/2019    Per[facility] by [facility]je Hamlin [facility] at [facility] OR    VARICOSE VEIN SURGERY         Current Outpatient Medications   Medication Sig    acetaminophen (TYLENOL) 325 MG tablet Take 2 tablets (650 mg total) by mouth every 4 (four) hours as needed.    albuterol-ipratropium (DUO-NEB) 2.5 mg-0.5 mg/3 mL nebulizer solution USE ONE VIAL IN NEBULIZER EVERY 6 HOURS WHILE AWAKE    amLODIPine (NORVASC) 10 MG tablet TAKE ONE TABLET BY MOUTH ONCE DAILY    ascorbic acid (VITAMIN C) 500 MG tablet Take 500 mg by mouth once daily.      aspirin (ECOTRIN) 81 MG EC tablet Take 81 mg by mouth once daily.      calcium carbonate (OS-TASIA) 500 mg calcium (1,250 mg) tablet Take 1 tablet  by mouth 2 (two) times daily.      ELIQUIS 5 mg Tab TAKE 1 TABLET BY MOUTH TWICE DAILY    ferrous sulfate (FEOSOL) 325 mg (65 mg iron) Tab tablet Take 1 tablet (325 mg total) by mouth 2 (two) times daily with meals.    fish oil-omega-3 fatty acids 300-1,000 mg capsule Take 2 g by mouth every evening.     fluticasone (FLONASE) 50 mcg/actuation nasal spray 2 sprays by Each Nare route once daily.    fluticasone-salmeterol 232-14 mcg/actuation AePB Inhale 1 puff into the lungs 2 (two) times daily.    folic acid-vit B6-vit B12 2.5-25-2 mg (FOLBIC OR EQUIV) 2.5-25-2 mg Tab Take 1 tablet by mouth once daily.    gabapentin (NEURONTIN) 300 MG capsule TAKE ONE CAPSULE BY MOUTH IN THE EVENING    HYDROcodone-acetaminophen (NORCO) 5-325 mg per tablet Take 1 tablet by mouth every 4 (four) hours as needed.    levothyroxine (SYNTHROID) 88 MCG tablet Take 1 tablet (88 mcg total) by mouth before breakfast.    magnesium oxide (MAG-OX) 400 mg tablet TAKE ONE TABLET BY MOUTH ONCE DAILY    montelukast (SINGULAIR) 10 mg tablet Take 1 tablet (10 mg total) by mouth every evening.    multivitamin (THERAGRAN) tablet Take 1 tablet by mouth once daily.    nitroGLYCERIN (NITROSTAT) 0.4 MG SL tablet Place 1 tablet (0.4 mg total) under the tongue every 5 (five) minutes as needed for Chest pain. As needed (Patient taking differently: Place 0.4 mg under the tongue every 5 (five) minutes as needed for Chest pain. Seek medical help if pain is not relieved after the third dose.)    polyethylene glycol (GLYCOLAX) 17 gram PwPk Take 17 g by mouth 2 (two) times daily.    senna-docusate 8.6-50 mg (PERICOLACE) 8.6-50 mg per tablet Take 1 tablet by mouth 2 (two) times daily.    simvastatin (ZOCOR) 40 MG tablet TAKE ONE TABLET BY MOUTH ONCE DAILY IN THE EVENING    SITagliptin (JANUVIA) 25 MG Tab Take 1 tablet (25 mg total) by mouth once daily.    vitamin D 1000 units Tab Take 1 tablet (1,000 Units total) by mouth once daily.     No current  facility-administered medications for this visit.        Review of patient's allergies indicates:   Allergen Reactions    Carvedilol Other (See Comments)     Bradycardia and syncope    Boniva [ibandronate]     Codeine     Hydralazine analogues     Iodinated contrast- oral and iv dye Hives    Kionex [sodium polystyrene sulfonate] Diarrhea     From encounter 12/11/17 for diarrhea--not true allergy.    Lisinopril     Morphine        Family History   Problem Relation Age of Onset    Hypertension Mother     Heart disease Mother     Lung disease Father     Diabetes Sister     Diabetes Brother     Kidney disease Son        Social History     Socioeconomic History    Marital status: Legally      Spouse name: Not on file    Number of children: Not on file    Years of education: Not on file    Highest education level: Not on file   Occupational History    Not on file   Social Needs    Financial resource strain: Not on file    Food insecurity:     Worry: Not on file     Inability: Not on file    Transportation needs:     Medical: Not on file     Non-medical: Not on file   Tobacco Use    Smoking status: Former Smoker     Packs/day: 0.35     Years: 44.00     Pack years: 15.40     Types: Cigarettes     Last attempt to quit: 4/30/2015     Years since quitting: 3.9    Smokeless tobacco: Never Used    Tobacco comment: 1/08/2015   Substance and Sexual Activity    Alcohol use: No     Alcohol/week: 0.0 oz    Drug use: No    Sexual activity: Never   Lifestyle    Physical activity:     Days per week: Not on file     Minutes per session: Not on file    Stress: Not on file   Relationships    Social connections:     Talks on phone: Not on file     Gets together: Not on file     Attends [employer] service: Not on file     Active member of club or organization: Not on file     Attends meetings of clubs or organizations: Not on file     Relationship status: Not on file   Other Topics Concern    Not on  file   Social History Narrative    Not on file       Chief Complaint:   Chief Complaint   Patient presents with    Post-op Evaluation     s/p left hip pinning 3/23/19        Date of surgery:  March 23, 2019    History of present illness:  88-year-old female who underwent left closed reduction percutaneous pinning of a femoral neck fracture.  Patient has no pain today. No wound issues.  Still has some pain with walking.      Review of Systems:    Musculoskeletal:  See HPI        Physical Examination:    Vital Signs:    Vitals:    04/11/19 1125   BP: (!) 173/74   Pulse: 68       Body mass index is 24.45 kg/m².    This a well-developed, well nourished patient in no acute distress.  They are alert and oriented and cooperative to examination.  Pt.  Comes in in a wheelchair.      Examination left hip shows well-healed surgical incision. No erythema or drainage.  Minimal swelling.  Mild pain with range of motion of the hip.    X-rays:  X-rays left hip are ordered and reviewed which show percutaneous pinning of the left femoral neck.  There has been some further impaction of the fracture since the initial pinning.     Assessment::  Status post CR PP left femoral neck fracture    Plan:  I reviewed the findings with her today.  Continue weight-bearing as tolerated with an assistive device.  Patient may get the incision wet.  Follow up in 4 weeks with repeat x-rays of the left hip.    This note was created using M Modal voice recognition software that occasionally misinterpreted phrases or words.

## 2019-04-17 ENCOUNTER — PATIENT MESSAGE (OUTPATIENT)
Dept: FAMILY MEDICINE | Facility: CLINIC | Age: 84
End: 2019-04-17

## 2019-04-17 DIAGNOSIS — R53.1 GENERAL WEAKNESS: Primary | ICD-10-CM

## 2019-04-23 ENCOUNTER — PATIENT MESSAGE (OUTPATIENT)
Dept: FAMILY MEDICINE | Facility: CLINIC | Age: 84
End: 2019-04-23

## 2019-04-23 ENCOUNTER — OFFICE VISIT (OUTPATIENT)
Dept: FAMILY MEDICINE | Facility: CLINIC | Age: 84
End: 2019-04-23
Payer: MEDICARE

## 2019-04-23 ENCOUNTER — DOCUMENTATION ONLY (OUTPATIENT)
Dept: FAMILY MEDICINE | Facility: CLINIC | Age: 84
End: 2019-04-23

## 2019-04-23 VITALS
SYSTOLIC BLOOD PRESSURE: 144 MMHG | HEIGHT: 63 IN | HEART RATE: 65 BPM | TEMPERATURE: 98 F | DIASTOLIC BLOOD PRESSURE: 56 MMHG | BODY MASS INDEX: 24.45 KG/M2

## 2019-04-23 DIAGNOSIS — E11.40 TYPE 2 DIABETES MELLITUS WITH DIABETIC NEUROPATHY, WITHOUT LONG-TERM CURRENT USE OF INSULIN: ICD-10-CM

## 2019-04-23 DIAGNOSIS — I15.2 HYPERTENSION ASSOCIATED WITH DIABETES: ICD-10-CM

## 2019-04-23 DIAGNOSIS — I50.32 DIASTOLIC CHF, CHRONIC: ICD-10-CM

## 2019-04-23 DIAGNOSIS — E11.59 HYPERTENSION ASSOCIATED WITH DIABETES: ICD-10-CM

## 2019-04-23 DIAGNOSIS — S72.002D CLOSED FRACTURE OF LEFT HIP WITH ROUTINE HEALING, SUBSEQUENT ENCOUNTER: Primary | ICD-10-CM

## 2019-04-23 PROCEDURE — 99214 PR OFFICE/OUTPT VISIT, EST, LEVL IV, 30-39 MIN: ICD-10-PCS | Mod: S$GLB,,, | Performed by: PHYSICIAN ASSISTANT

## 2019-04-23 PROCEDURE — 1100F PTFALLS ASSESS-DOCD GE2>/YR: CPT | Mod: CPTII,S$GLB,, | Performed by: PHYSICIAN ASSISTANT

## 2019-04-23 PROCEDURE — 99214 OFFICE O/P EST MOD 30 MIN: CPT | Mod: S$GLB,,, | Performed by: PHYSICIAN ASSISTANT

## 2019-04-23 PROCEDURE — 99999 PR PBB SHADOW E&M-EST. PATIENT-LVL III: CPT | Mod: PBBFAC,,, | Performed by: PHYSICIAN ASSISTANT

## 2019-04-23 PROCEDURE — 99499 RISK ADDL DX/OHS AUDIT: ICD-10-PCS | Mod: S$GLB,,, | Performed by: PHYSICIAN ASSISTANT

## 2019-04-23 PROCEDURE — 3288F PR FALLS RISK ASSESSMENT DOCUMENTED: ICD-10-PCS | Mod: CPTII,S$GLB,, | Performed by: PHYSICIAN ASSISTANT

## 2019-04-23 PROCEDURE — 99999 PR PBB SHADOW E&M-EST. PATIENT-LVL III: ICD-10-PCS | Mod: PBBFAC,,, | Performed by: PHYSICIAN ASSISTANT

## 2019-04-23 PROCEDURE — 3288F FALL RISK ASSESSMENT DOCD: CPT | Mod: CPTII,S$GLB,, | Performed by: PHYSICIAN ASSISTANT

## 2019-04-23 PROCEDURE — 1100F PR PT FALLS ASSESS DOC 2+ FALLS/FALL W/INJURY/YR: ICD-10-PCS | Mod: CPTII,S$GLB,, | Performed by: PHYSICIAN ASSISTANT

## 2019-04-23 PROCEDURE — 99499 UNLISTED E&M SERVICE: CPT | Mod: S$GLB,,, | Performed by: PHYSICIAN ASSISTANT

## 2019-04-23 NOTE — PROGRESS NOTES
Subjective:       Patient ID: Blaire Cage is a 88 y.o. female.    Chief Complaint: Hospital Follow Up    HPI'    This is an 88 year old female who presents to the clinic as a hospital follow up. She presented to the ED 3/22, was admitted for 4 days and discharged 3/26. She presented to the ED immediately after a fall while washing her car outside. She was experiencing pain in her upper left leg, left hip, left hammad. She was evaluated in ED and found to have subcapital left hip fracture. Patient was admitted, eliquis held, and Dr. Hamlin was consulted. Patient underwent ORIF of left femoral neck fracture with good alignment. She was discharged to [facility].     Patient has since seen Dr. Hamlin for follow-up. She is still undergoing PT/OT at home. She is to continue weight-bearing as tolerated with walker. She has another f/u scheduled in 4 weeks.    Review of Systems   Constitutional: Negative for activity change, appetite change, chills, diaphoresis, fatigue and fever.   HENT: Negative for congestion, postnasal drip and rhinorrhea.    Respiratory: Negative.  Negative for cough, shortness of breath and wheezing.    Cardiovascular: Negative.  Negative for chest pain.   Gastrointestinal: Negative for abdominal pain, blood in stool, constipation, diarrhea, nausea and vomiting.   Genitourinary: Negative for dysuria, frequency, hematuria and urgency.   Musculoskeletal: Negative.    Skin: Negative.  Negative for color change and rash.   Neurological: Negative for dizziness and syncope.   Psychiatric/Behavioral: Negative for agitation, behavioral problems and confusion.       Objective:      Physical Exam   Constitutional: Vital signs are normal. She appears well-developed and well-nourished. No distress.   Cardiovascular: Normal rate, regular rhythm, S1 normal, S2 normal and normal heart sounds. Exam reveals no gallop.   No murmur heard.  Pulses:       Radial pulses are 2+ on the  right side, and 2+ on the left side.   <2sec cap refill fingers bilat     Pulmonary/Chest: Effort normal and breath sounds normal. No respiratory distress. She has no wheezes. She has no rhonchi.   Skin: Skin is warm and dry. She is not diaphoretic.   Mild lower leg/ankle swelling bilaterally, L>R   Psychiatric: She has a normal mood and affect. Her speech is normal and behavior is normal. Judgment and thought content normal. Cognition and memory are normal.       Assessment:       1. Closed fracture of left hip with routine healing, subsequent encounter    2. Hypertension associated with diabetes    3. Type 2 diabetes mellitus with diabetic neuropathy, without long-term current use of insulin    4. Diastolic CHF, chronic        Plan:       Blaire was seen today for hospital follow up.    Diagnoses and all orders for this visit:    Closed fracture of left hip with routine healing, subsequent encounter  Routine healing as expected  Continue PT/OT as directed  Continue with pain management as directed  COntinue close f/u with Dr. Justin    Hypertension associated with diabetes  Controlled. Patient's pressures are very labile and brittle. I think 144/56 is appropriate for patient.     Type 2 diabetes mellitus with diabetic neuropathy, without long-term current use of insulin  Well controlled with diet and exercise    Diastolic CHF, chronic  Stable.   Patient needs rx to restart lasix. Patient's daughter states she was previously taking lasix 20mg Tuesday, Thursday. She needs a refill for this.    Patient readiness: acceptance and barriers:none    During the course of the visit the patient was educated and counseled about the following:     Diabetes:  Discussed general issues about diabetes pathophysiology and management.  Hypertension:   Medication: no change.    Goals: Diabetes: Maintain Hemoglobin A1C below 7 and Hypertension: Reduce Blood Pressure    Did patient meet goals/outcomes: No    The following self  management tools provided: declined    Patient Instructions (the written plan) was given to the patient/family.     Time spent with patient: 30 minutes    Barriers to medications present (no )    Adverse reactions to current medications (no)    Over the counter medications reviewed (Yes)

## 2019-04-23 NOTE — PROGRESS NOTES
Pre-Visit Chart Review  For Appointment Scheduled on 04/23/19    Health Maintenance Due   Topic Date Due    TETANUS VACCINE  07/21/1948    Eye Exam  10/30/2016    Lipid Panel  05/23/2018

## 2019-04-24 RX ORDER — FUROSEMIDE 20 MG/1
TABLET ORAL
Qty: 24 TABLET | Refills: 1 | Status: SHIPPED | OUTPATIENT
Start: 2019-04-24 | End: 2019-11-29 | Stop reason: SDUPTHER

## 2019-04-30 DIAGNOSIS — R79.83 HOMOCYSTEINEMIA: ICD-10-CM

## 2019-04-30 DIAGNOSIS — J45.20 MILD INTERMITTENT ASTHMATIC BRONCHITIS WITHOUT COMPLICATION: ICD-10-CM

## 2019-04-30 DIAGNOSIS — R09.89 CHRONIC SINUS COMPLAINTS: ICD-10-CM

## 2019-04-30 DIAGNOSIS — Z15.89 HETEROZYGOUS MTHFR MUTATION C677T: ICD-10-CM

## 2019-05-01 RX ORDER — FOLIC ACID-PYRIDOXINE-CYANOCOBALAMIN TAB 2.5-25-2 MG 2.5-25-2 MG
TAB ORAL
Qty: 30 TABLET | Refills: 6 | Status: SHIPPED | OUTPATIENT
Start: 2019-05-01 | End: 2019-11-05 | Stop reason: SDUPTHER

## 2019-05-02 ENCOUNTER — CLINICAL SUPPORT (OUTPATIENT)
Dept: CARDIOLOGY | Facility: CLINIC | Age: 84
End: 2019-05-02
Attending: INTERNAL MEDICINE
Payer: MEDICARE

## 2019-05-02 DIAGNOSIS — Z95.818 STATUS POST PLACEMENT OF IMPLANTABLE LOOP RECORDER: ICD-10-CM

## 2019-05-02 DIAGNOSIS — R55 RECURRENT SYNCOPE: ICD-10-CM

## 2019-05-02 RX ORDER — MONTELUKAST SODIUM 10 MG/1
TABLET ORAL
Qty: 90 TABLET | Refills: 3 | Status: SHIPPED | OUTPATIENT
Start: 2019-05-02 | End: 2019-06-06 | Stop reason: CLARIF

## 2019-05-06 ENCOUNTER — TELEPHONE (OUTPATIENT)
Dept: ADMINISTRATIVE | Facility: CLINIC | Age: 84
End: 2019-05-06

## 2019-05-06 ENCOUNTER — TELEPHONE (OUTPATIENT)
Dept: HEMATOLOGY/ONCOLOGY | Facility: CLINIC | Age: 84
End: 2019-05-06

## 2019-05-06 NOTE — TELEPHONE ENCOUNTER
Called to see if the pt had any labs done prior to f/u appt.    Spoke w/ uche pt's daughter and will try and have her brother bring mom tomorrow to have the labs done.

## 2019-05-06 NOTE — TELEPHONE ENCOUNTER
Home Health SOC 04/16/2019 - 06/14/2019 with [facility] - Dr. Eli Edmonds. Patient received SN , PT, OT and ST services.

## 2019-05-07 ENCOUNTER — LAB VISIT (OUTPATIENT)
Dept: LAB | Facility: HOSPITAL | Age: 84
End: 2019-05-07
Attending: NURSE PRACTITIONER
Payer: MEDICARE

## 2019-05-07 DIAGNOSIS — Z86.711 HISTORY OF PULMONARY EMBOLISM: ICD-10-CM

## 2019-05-07 DIAGNOSIS — I26.99 OTHER ACUTE PULMONARY EMBOLISM WITHOUT ACUTE COR PULMONALE: ICD-10-CM

## 2019-05-07 DIAGNOSIS — D53.9 MACROCYTIC ANEMIA: ICD-10-CM

## 2019-05-07 DIAGNOSIS — I82.4Y2 DEEP VEIN THROMBOSIS (DVT) OF PROXIMAL VEIN OF LEFT LOWER EXTREMITY, UNSPECIFIED CHRONICITY: ICD-10-CM

## 2019-05-07 DIAGNOSIS — D63.1 ANEMIA, CHRONIC RENAL FAILURE, STAGE 3 (MODERATE): ICD-10-CM

## 2019-05-07 DIAGNOSIS — D64.9 NORMOCYTIC NORMOCHROMIC ANEMIA: ICD-10-CM

## 2019-05-07 DIAGNOSIS — R79.83 HOMOCYSTEINEMIA: ICD-10-CM

## 2019-05-07 DIAGNOSIS — D50.0 IRON DEFICIENCY ANEMIA DUE TO CHRONIC BLOOD LOSS: ICD-10-CM

## 2019-05-07 DIAGNOSIS — D64.89 ANEMIA DUE TO MULTIPLE MECHANISMS: ICD-10-CM

## 2019-05-07 DIAGNOSIS — N18.30 ANEMIA, CHRONIC RENAL FAILURE, STAGE 3 (MODERATE): ICD-10-CM

## 2019-05-07 DIAGNOSIS — Z15.89 HETEROZYGOUS MTHFR MUTATION C677T: ICD-10-CM

## 2019-05-07 DIAGNOSIS — D63.8 ANEMIA, CHRONIC DISEASE: ICD-10-CM

## 2019-05-07 DIAGNOSIS — Z79.01 CHRONIC ANTICOAGULATION: ICD-10-CM

## 2019-05-07 LAB
ALBUMIN SERPL BCP-MCNC: 3.4 G/DL (ref 3.5–5.2)
ALP SERPL-CCNC: 67 U/L (ref 55–135)
ALT SERPL W/O P-5'-P-CCNC: 15 U/L (ref 10–44)
ANION GAP SERPL CALC-SCNC: 11 MMOL/L (ref 8–16)
AST SERPL-CCNC: 25 U/L (ref 10–40)
BASOPHILS # BLD AUTO: 0.07 K/UL (ref 0–0.2)
BASOPHILS NFR BLD: 0.8 % (ref 0–1.9)
BILIRUB SERPL-MCNC: 0.3 MG/DL (ref 0.1–1)
BUN SERPL-MCNC: 49 MG/DL (ref 8–23)
CALCIUM SERPL-MCNC: 9.7 MG/DL (ref 8.7–10.5)
CHLORIDE SERPL-SCNC: 99 MMOL/L (ref 95–110)
CO2 SERPL-SCNC: 28 MMOL/L (ref 23–29)
CREAT SERPL-MCNC: 1.8 MG/DL (ref 0.5–1.4)
DIFFERENTIAL METHOD: ABNORMAL
EOSINOPHIL # BLD AUTO: 0.1 K/UL (ref 0–0.5)
EOSINOPHIL NFR BLD: 1.5 % (ref 0–8)
ERYTHROCYTE [DISTWIDTH] IN BLOOD BY AUTOMATED COUNT: 13.4 % (ref 11.5–14.5)
EST. GFR  (AFRICAN AMERICAN): 28.6 ML/MIN/1.73 M^2
EST. GFR  (NON AFRICAN AMERICAN): 24.8 ML/MIN/1.73 M^2
FERRITIN SERPL-MCNC: 223 NG/ML (ref 20–300)
GLUCOSE SERPL-MCNC: 65 MG/DL (ref 70–110)
HCT VFR BLD AUTO: 27.1 % (ref 37–48.5)
HCYS SERPL-SCNC: 13.2 UMOL/L (ref 4–15.5)
HGB BLD-MCNC: 8.5 G/DL (ref 12–16)
IMM GRANULOCYTES # BLD AUTO: 0.09 K/UL (ref 0–0.04)
IMM GRANULOCYTES NFR BLD AUTO: 1 % (ref 0–0.5)
IRON SERPL-MCNC: 63 UG/DL (ref 30–160)
LYMPHOCYTES # BLD AUTO: 2.1 K/UL (ref 1–4.8)
LYMPHOCYTES NFR BLD: 24.8 % (ref 18–48)
MCH RBC QN AUTO: 31.5 PG (ref 27–31)
MCHC RBC AUTO-ENTMCNC: 31.4 G/DL (ref 32–36)
MCV RBC AUTO: 100 FL (ref 82–98)
MONOCYTES # BLD AUTO: 1 K/UL (ref 0.3–1)
MONOCYTES NFR BLD: 12 % (ref 4–15)
NEUTROPHILS # BLD AUTO: 5.1 K/UL (ref 1.8–7.7)
NEUTROPHILS NFR BLD: 59.9 % (ref 38–73)
NRBC BLD-RTO: 0 /100 WBC
PLATELET # BLD AUTO: 216 K/UL (ref 150–350)
PMV BLD AUTO: 11.5 FL (ref 9.2–12.9)
POTASSIUM SERPL-SCNC: 4.5 MMOL/L (ref 3.5–5.1)
PROT SERPL-MCNC: 6.8 G/DL (ref 6–8.4)
RBC # BLD AUTO: 2.7 M/UL (ref 4–5.4)
SATURATED IRON: 22 % (ref 20–50)
SODIUM SERPL-SCNC: 138 MMOL/L (ref 136–145)
TOTAL IRON BINDING CAPACITY: 287 UG/DL (ref 250–450)
TRANSFERRIN SERPL-MCNC: 194 MG/DL (ref 200–375)
VIT B12 SERPL-MCNC: >2000 PG/ML (ref 210–950)
WBC # BLD AUTO: 8.58 K/UL (ref 3.9–12.7)

## 2019-05-07 PROCEDURE — 85025 COMPLETE CBC W/AUTO DIFF WBC: CPT

## 2019-05-07 PROCEDURE — 83090 ASSAY OF HOMOCYSTEINE: CPT

## 2019-05-07 PROCEDURE — 82607 VITAMIN B-12: CPT

## 2019-05-07 PROCEDURE — 82746 ASSAY OF FOLIC ACID SERUM: CPT

## 2019-05-07 PROCEDURE — 82728 ASSAY OF FERRITIN: CPT

## 2019-05-07 PROCEDURE — 36415 COLL VENOUS BLD VENIPUNCTURE: CPT | Mod: PO

## 2019-05-07 PROCEDURE — 83540 ASSAY OF IRON: CPT

## 2019-05-07 PROCEDURE — 80053 COMPREHEN METABOLIC PANEL: CPT

## 2019-05-07 NOTE — PROGRESS NOTES
[facility] Hematology/Oncology  PROGRESS NOTE        Subjective:       Patient ID:   NAME: Blaire Cage : 1930     88 y.o. female    Referring Doc: Sandro  Other Physicians: Cande Garg (PA); Eli Edmonds (PCP); Jeffrey Christopher (Pulm); Sandra (GI)    Chief Complaint:  DVT and PE f/u    History of Present Illness:     Patient returns today for a  regularly scheduled follow-up visit.  The patient is here today to go over the results of the recently ordered labs, tests and studies. She is here with her daughter. She is breathing ok. She denies any swelling in the legs. She denies any CP, SOB, HA's or N/V. She was recently hospitalized at [facility] after having a fall at home with subsequent hip fracture and was discharged on 3/26/2019; she only required a pin placement; she uses a walker to ambulate          ROS:   GEN: normal without any fever, night sweats or weight loss  HEENT: normal with no HA's, sore throat, stiff neck, changes in vision  CV: normal with no CP, SOB, PND, WEST or orthopnea  PULM: normal with no SOB, cough, hemoptysis, sputum or pleuritic pain  GI: normal with no abdominal pain, nausea, vomiting, constipation, diarrhea, melanotic stools, BRBPR, or hematemesis  : normal with no hematuria, dysuria  BREAST: normal with no mass, discharge, pain  SKIN: normal with no rash, erythema, bruising, or swelling    Allergies:  Review of patient's allergies indicates:   Allergen Reactions    Carvedilol Other (See Comments)     Bradycardia and syncope    Boniva [ibandronate]     Codeine     Hydralazine analogues     Iodinated contrast- oral and iv dye Hives    Kionex [sodium polystyrene sulfonate] Diarrhea     From encounter 17 for diarrhea--not true allergy.    Lisinopril     Morphine        Medications:    Current Outpatient Medications:     acetaminophen (TYLENOL) 325 MG tablet, Take 2 tablets (650 mg total) by mouth every 4 (four) hours as needed., Disp: , Rfl: 0    albuterol-ipratropium  (DUO-NEB) 2.5 mg-0.5 mg/3 mL nebulizer solution, USE ONE VIAL IN NEBULIZER EVERY 6 HOURS WHILE AWAKE, Disp: 120 vial, Rfl: 5    amLODIPine (NORVASC) 10 MG tablet, TAKE ONE TABLET BY MOUTH ONCE DAILY, Disp: 90 tablet, Rfl: 3    ascorbic acid (VITAMIN C) 500 MG tablet, Take 500 mg by mouth once daily.  , Disp: , Rfl:     aspirin (ECOTRIN) 81 MG EC tablet, Take 81 mg by mouth once daily.  , Disp: , Rfl:     calcium carbonate (OS-TASIA) 500 mg calcium (1,250 mg) tablet, Take 1 tablet by mouth 2 (two) times daily.  , Disp: , Rfl:     ELIQUIS 5 mg Tab, TAKE 1 TABLET BY MOUTH TWICE DAILY, Disp: 60 tablet, Rfl: 5    ferrous sulfate (FEOSOL) 325 mg (65 mg iron) Tab tablet, Take 1 tablet (325 mg total) by mouth 2 (two) times daily with meals., Disp: 180 tablet, Rfl: 3    fish oil-omega-3 fatty acids 300-1,000 mg capsule, Take 2 g by mouth every evening. , Disp: , Rfl:     fluticasone (FLONASE) 50 mcg/actuation nasal spray, 2 sprays by Each Nare route once daily., Disp: 48 g, Rfl: 3    fluticasone-salmeterol 232-14 mcg/actuation AePB, Inhale 1 puff into the lungs 2 (two) times daily., Disp: 1 each, Rfl: 11    FOLBIC 2.5-25-2 mg Tab, TAKE 1 TABLET BY MOUTH ONCE DAILY, Disp: 30 tablet, Rfl: 6    furosemide (LASIX) 20 MG tablet, Take 1 tablet on Tuesdays and Thursdays., Disp: 24 tablet, Rfl: 1    gabapentin (NEURONTIN) 300 MG capsule, TAKE ONE CAPSULE BY MOUTH IN THE EVENING, Disp: 90 capsule, Rfl: 3    HYDROcodone-acetaminophen (NORCO) 5-325 mg per tablet, Take 1 tablet by mouth every 4 (four) hours as needed., Disp: 10 tablet, Rfl: 0    levothyroxine (SYNTHROID) 88 MCG tablet, Take 1 tablet (88 mcg total) by mouth before breakfast., Disp: 90 tablet, Rfl: 3    magnesium oxide (MAG-OX) 400 mg tablet, TAKE ONE TABLET BY MOUTH ONCE DAILY, Disp: 90 tablet, Rfl: 3    montelukast (SINGULAIR) 10 mg tablet, TAKE 1 TABLET BY MOUTH ONCE DAILY IN THE EVENING, Disp: 90 tablet, Rfl: 3    multivitamin (THERAGRAN) tablet, Take 1  tablet by mouth once daily., Disp: , Rfl:     nitroGLYCERIN (NITROSTAT) 0.4 MG SL tablet, Place 1 tablet (0.4 mg total) under the tongue every 5 (five) minutes as needed for Chest pain. As needed (Patient taking differently: Place 0.4 mg under the tongue every 5 (five) minutes as needed for Chest pain. Seek medical help if pain is not relieved after the third dose.), Disp: 30 tablet, Rfl: 3    polyethylene glycol (GLYCOLAX) 17 gram PwPk, Take 17 g by mouth 2 (two) times daily., Disp: 60 packet, Rfl: 0    senna-docusate 8.6-50 mg (PERICOLACE) 8.6-50 mg per tablet, Take 1 tablet by mouth 2 (two) times daily., Disp: , Rfl:     simvastatin (ZOCOR) 40 MG tablet, TAKE ONE TABLET BY MOUTH ONCE DAILY IN THE EVENING, Disp: 90 tablet, Rfl: 3    SITagliptin (JANUVIA) 25 MG Tab, Take 1 tablet (25 mg total) by mouth once daily., Disp: 30 tablet, Rfl: 5    vitamin D 1000 units Tab, Take 1 tablet (1,000 Units total) by mouth once daily., Disp: 90 tablet, Rfl: 3    PMHx/PSHx Updates:  See patient's last visit with me on 2/7/2019.  See H&P on 6/29/2018        Pathology:  Cancer Staging  No matching staging information was found for the patient.          Objective:     Vitals:  Blood pressure (!) 127/51, pulse 71, temperature 98.4 °F (36.9 °C), temperature source Oral, resp. rate 20, weight 57.8 kg (127 lb 6.4 oz).    Physical Examination:   GEN: no apparent distress, comfortable; AAOx3  HEAD: atraumatic and normocephalic  EYES: no pallor, no icterus, PERRLA  ENT: OMM, no pharyngeal erythema, external ears WNL; no nasal discharge; no thrush  NECK: no masses, thyroid normal, trachea midline, no LAD/LN's, supple  CV: RRR with no murmur; normal pulse; normal S1 and S2; no pedal edema  CHEST: Normal respiratory effort; CTAB; normal breath sounds; no wheeze or crackles  ABDOM: nontender and nondistended; soft; normal bowel sounds; no rebound/guarding  MUSC/Skeletal: ROM normal; no crepitus; joints normal; no deformities or  arthropathy; no longer on walker  EXTREM: no clubbing, cyanosis, inflammation or swelling  SKIN: no rashes, lesions, ulcers, petechiae or subcutaneous nodules; vitiligo   : no carter  NEURO: grossly intact; motor/sensory WNL; AAOx3; no tremors  PSYCH: normal mood, affect and behavior  LYMPH: normal cervical, supraclavicular, axillary and groin LN's             Labs:     5/7/2019    Lab Results   Component Value Date    WBC 8.58 05/07/2019    HGB 8.5 (L) 05/07/2019    HCT 27.1 (L) 05/07/2019     (H) 05/07/2019     05/07/2019            BMP  Lab Results   Component Value Date     05/07/2019    K 4.5 05/07/2019    CL 99 05/07/2019    CO2 28 05/07/2019    BUN 49 (H) 05/07/2019    CREATININE 1.8 (H) 05/07/2019    CALCIUM 9.7 05/07/2019    ANIONGAP 11 05/07/2019    ESTGFRAFRICA 28.6 (A) 05/07/2019    EGFRNONAA 24.8 (A) 05/07/2019       Lab Results   Component Value Date    IRON 63 05/07/2019    TIBC 287 05/07/2019    FERRITIN 223 05/07/2019           Radiology/Diagnostic Studies:    Ct Head Without Contrast    Result Date: 7/16/2018  EXAMINATION: CT HEAD WITHOUT CONTRAST CLINICAL HISTORY: Dizziness; TECHNIQUE: 5 mm noncontrast axial images were acquired through the head. COMPARISON: CT head without contrast-11/29/2014 FINDINGS: The brain is normally formed with preserved gray-white matter junction differentiation. No evidence of acute/recent major vascular territory cerebral infarction, parenchymal hemorrhage, or intra-axial mass.  There is age-appropriate cerebral volume loss with associated compensatory enlargement of the ventricular system and widening of the CSF spaces over both cerebral convexities.  There are confluent areas of periventricular white matter hypoattenuation compatible with age-appropriate chronic microvascular ischemic changes. No hydrocephalus.  No effacement of the skull-base cisterns.  No extra-axial fluid collections or blood products. The paranasal sinuses and mastoid air  cells are clear.  There is rightward bony nasal septal deviation.  The visualized orbits are unremarkable.  The bony calvarium and visualized facial bones show no acute abnormality.     No acute intracranial abnormality appreciated.  No interval detrimental change in the imaging appearance of the brain when compared to the previous study. Electronically signed by: Aubrey Davis MD Date:    07/16/2018 Time:    08:01    Radiology Report    Result Date: 7/15/2018  Ordered by an unspecified provider.      I have reviewed all available lab results and radiology reports.    Assessment/Plan:   (1) 88 y.o. female with diagnosis of a recent LLE DVT and Pulmonary emboli who has been referred by Dr Jo with Ne[facility] for evaluation by medical hematology/oncology. She was hospitalized [facility]. She has never had any clots before. No family hx/of clots.    - factor panel, protein C and S, ATIII adequate  - antiphosphatidyl negative; LA negative  - homocysteine elevated at 20.2  - MTHFR-C heterozygous positive - discussed the genetic implications with her and her daughter  - prothrombin II negative  - she is on eliquis bid      (2) COPD/asthma; former smoker     (3) Left ventricular dysfunction     (4) HTN and hypercholesterolemia     (5) CRI - stage III     (6) Anemia - most likely a multifactorial process with iron deficiency diagnosis, anemia of chronic disorders, anemia of chronic renal  - latest hgb is lower at 8.5  - she is on iron and MVI  - iron panel is adequate    (7) DM and hypothyroidism    (8) Recent hip fracture - left - needed pin placement only            Normocytic normochromic anemia    Macrocytic anemia    Iron deficiency anemia due to chronic blood loss    Anemia, chronic renal failure, stage 3 (moderate)    Anemia, chronic disease    Anemia due to multiple mechanisms    Anemia, unspecified type    Other acute pulmonary embolism without acute cor pulmonale    History of pulmonary embolism    Deep vein thrombosis  (DVT) of proximal vein of left lower extremity, unspecified chronicity    Chronic anticoagulation    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 Continue Folbic for the hyperhomocysteinemia  2. Continue eliquis and consider lifelong usage  3. Previously  discussed the genetic implications of the MTHFR-C in siblings and children  4. She needs to see GI - will make referral  5. F/u with PCP, GI etc  6. Check labs monthly for now (encouraged compliance)  RTC in 1 month    Fax note to Eli Edmonds MD; Reva Jo    Discussion:       I spent over 25 mins of time with the patient. Reviewed results of the recently ordered labs, tests and studies; made directives with regards to the results. Over half of this time was spent couseling and coordinating care.    I have explained all of the above in detail and the patient understands all of the current recommendation(s). I have answered all of their questions to the best of my ability and to their complete satisfaction.   The patient is to continue with the current management plan.            Electronically signed by Issac Alvarez MD

## 2019-05-08 ENCOUNTER — OFFICE VISIT (OUTPATIENT)
Dept: HEMATOLOGY/ONCOLOGY | Facility: CLINIC | Age: 84
End: 2019-05-08
Payer: MEDICARE

## 2019-05-08 VITALS
RESPIRATION RATE: 20 BRPM | TEMPERATURE: 98 F | SYSTOLIC BLOOD PRESSURE: 127 MMHG | BODY MASS INDEX: 22.57 KG/M2 | DIASTOLIC BLOOD PRESSURE: 51 MMHG | HEART RATE: 71 BPM | WEIGHT: 127.38 LBS

## 2019-05-08 DIAGNOSIS — Z86.711 HISTORY OF PULMONARY EMBOLISM: ICD-10-CM

## 2019-05-08 DIAGNOSIS — R79.83 HOMOCYSTEINEMIA: ICD-10-CM

## 2019-05-08 DIAGNOSIS — D63.8 ANEMIA, CHRONIC DISEASE: ICD-10-CM

## 2019-05-08 DIAGNOSIS — D50.0 IRON DEFICIENCY ANEMIA DUE TO CHRONIC BLOOD LOSS: ICD-10-CM

## 2019-05-08 DIAGNOSIS — F17.200 TOBACCO DEPENDENCE: ICD-10-CM

## 2019-05-08 DIAGNOSIS — D53.9 MACROCYTIC ANEMIA: ICD-10-CM

## 2019-05-08 DIAGNOSIS — N18.30 ANEMIA, CHRONIC RENAL FAILURE, STAGE 3 (MODERATE): ICD-10-CM

## 2019-05-08 DIAGNOSIS — D64.9 NORMOCYTIC NORMOCHROMIC ANEMIA: Primary | ICD-10-CM

## 2019-05-08 DIAGNOSIS — I26.99 OTHER ACUTE PULMONARY EMBOLISM WITHOUT ACUTE COR PULMONALE: ICD-10-CM

## 2019-05-08 DIAGNOSIS — D64.9 ANEMIA, UNSPECIFIED TYPE: ICD-10-CM

## 2019-05-08 DIAGNOSIS — Z15.89 HETEROZYGOUS MTHFR MUTATION C677T: ICD-10-CM

## 2019-05-08 DIAGNOSIS — Z79.01 CHRONIC ANTICOAGULATION: ICD-10-CM

## 2019-05-08 DIAGNOSIS — D64.89 ANEMIA DUE TO MULTIPLE MECHANISMS: ICD-10-CM

## 2019-05-08 DIAGNOSIS — I82.4Y2 DEEP VEIN THROMBOSIS (DVT) OF PROXIMAL VEIN OF LEFT LOWER EXTREMITY, UNSPECIFIED CHRONICITY: ICD-10-CM

## 2019-05-08 DIAGNOSIS — S72.002D CLOSED FRACTURE OF LEFT HIP WITH ROUTINE HEALING, SUBSEQUENT ENCOUNTER: Primary | ICD-10-CM

## 2019-05-08 DIAGNOSIS — D63.1 ANEMIA, CHRONIC RENAL FAILURE, STAGE 3 (MODERATE): ICD-10-CM

## 2019-05-08 LAB — FOLATE SERPL-MCNC: >40 NG/ML (ref 4–24)

## 2019-05-08 PROCEDURE — 1100F PR PT FALLS ASSESS DOC 2+ FALLS/FALL W/INJURY/YR: ICD-10-PCS | Mod: ,,, | Performed by: INTERNAL MEDICINE

## 2019-05-08 PROCEDURE — 99213 OFFICE O/P EST LOW 20 MIN: CPT | Mod: ,,, | Performed by: INTERNAL MEDICINE

## 2019-05-08 PROCEDURE — 1100F PTFALLS ASSESS-DOCD GE2>/YR: CPT | Mod: ,,, | Performed by: INTERNAL MEDICINE

## 2019-05-08 PROCEDURE — 3288F FALL RISK ASSESSMENT DOCD: CPT | Mod: ,,, | Performed by: INTERNAL MEDICINE

## 2019-05-08 PROCEDURE — 3288F PR FALLS RISK ASSESSMENT DOCUMENTED: ICD-10-PCS | Mod: ,,, | Performed by: INTERNAL MEDICINE

## 2019-05-08 PROCEDURE — 99213 PR OFFICE/OUTPT VISIT, EST, LEVL III, 20-29 MIN: ICD-10-PCS | Mod: ,,, | Performed by: INTERNAL MEDICINE

## 2019-05-10 ENCOUNTER — PATIENT MESSAGE (OUTPATIENT)
Dept: FAMILY MEDICINE | Facility: CLINIC | Age: 84
End: 2019-05-10

## 2019-05-13 ENCOUNTER — HOSPITAL ENCOUNTER (OUTPATIENT)
Dept: RADIOLOGY | Facility: HOSPITAL | Age: 84
Discharge: HOME | End: 2019-05-13
Attending: ORTHOPAEDIC SURGERY
Payer: MEDICARE

## 2019-05-13 ENCOUNTER — OFFICE VISIT (OUTPATIENT)
Dept: FAMILY MEDICINE | Facility: CLINIC | Age: 84
End: 2019-05-13
Payer: MEDICARE

## 2019-05-13 ENCOUNTER — OFFICE VISIT (OUTPATIENT)
Dept: ORTHOPEDICS | Facility: CLINIC | Age: 84
End: 2019-05-13
Payer: MEDICARE

## 2019-05-13 VITALS
HEIGHT: 63 IN | HEART RATE: 64 BPM | BODY MASS INDEX: 22.88 KG/M2 | SYSTOLIC BLOOD PRESSURE: 125 MMHG | DIASTOLIC BLOOD PRESSURE: 54 MMHG | WEIGHT: 129.13 LBS | TEMPERATURE: 98 F

## 2019-05-13 VITALS
SYSTOLIC BLOOD PRESSURE: 122 MMHG | HEIGHT: 63 IN | WEIGHT: 127 LBS | BODY MASS INDEX: 22.5 KG/M2 | DIASTOLIC BLOOD PRESSURE: 52 MMHG | HEART RATE: 73 BPM

## 2019-05-13 DIAGNOSIS — S72.002D CLOSED FRACTURE OF LEFT HIP WITH ROUTINE HEALING, SUBSEQUENT ENCOUNTER: ICD-10-CM

## 2019-05-13 DIAGNOSIS — N18.4 CKD (CHRONIC KIDNEY DISEASE) STAGE 4, GFR 15-29 ML/MIN: ICD-10-CM

## 2019-05-13 DIAGNOSIS — E11.59 HYPERTENSION ASSOCIATED WITH DIABETES: ICD-10-CM

## 2019-05-13 DIAGNOSIS — F32.A DEPRESSION, UNSPECIFIED DEPRESSION TYPE: Primary | ICD-10-CM

## 2019-05-13 DIAGNOSIS — S72.002D CLOSED FRACTURE OF LEFT HIP WITH ROUTINE HEALING, SUBSEQUENT ENCOUNTER: Primary | ICD-10-CM

## 2019-05-13 DIAGNOSIS — I15.2 HYPERTENSION ASSOCIATED WITH DIABETES: ICD-10-CM

## 2019-05-13 PROCEDURE — 99024 PR POST-OP FOLLOW-UP VISIT: ICD-10-PCS | Mod: S$GLB,,, | Performed by: ORTHOPAEDIC SURGERY

## 2019-05-13 PROCEDURE — 99999 PR PBB SHADOW E&M-EST. PATIENT-LVL III: CPT | Mod: PBBFAC,,, | Performed by: NURSE PRACTITIONER

## 2019-05-13 PROCEDURE — 1101F PT FALLS ASSESS-DOCD LE1/YR: CPT | Mod: CPTII,S$GLB,, | Performed by: NURSE PRACTITIONER

## 2019-05-13 PROCEDURE — 99214 PR OFFICE/OUTPT VISIT, EST, LEVL IV, 30-39 MIN: ICD-10-PCS | Mod: S$GLB,,, | Performed by: NURSE PRACTITIONER

## 2019-05-13 PROCEDURE — 1101F PR PT FALLS ASSESS DOC 0-1 FALLS W/OUT INJ PAST YR: ICD-10-PCS | Mod: CPTII,S$GLB,, | Performed by: NURSE PRACTITIONER

## 2019-05-13 PROCEDURE — 73502 XR HIP 2 VIEW LEFT: ICD-10-PCS | Mod: 26,LT,, | Performed by: RADIOLOGY

## 2019-05-13 PROCEDURE — 99499 RISK ADDL DX/OHS AUDIT: ICD-10-PCS | Mod: S$GLB,,, | Performed by: NURSE PRACTITIONER

## 2019-05-13 PROCEDURE — 99999 PR PBB SHADOW E&M-EST. PATIENT-LVL III: ICD-10-PCS | Mod: PBBFAC,,, | Performed by: ORTHOPAEDIC SURGERY

## 2019-05-13 PROCEDURE — 73502 X-RAY EXAM HIP UNI 2-3 VIEWS: CPT | Mod: 26,LT,, | Performed by: RADIOLOGY

## 2019-05-13 PROCEDURE — 99499 UNLISTED E&M SERVICE: CPT | Mod: S$GLB,,, | Performed by: NURSE PRACTITIONER

## 2019-05-13 PROCEDURE — 99024 POSTOP FOLLOW-UP VISIT: CPT | Mod: S$GLB,,, | Performed by: ORTHOPAEDIC SURGERY

## 2019-05-13 PROCEDURE — 99999 PR PBB SHADOW E&M-EST. PATIENT-LVL III: CPT | Mod: PBBFAC,,, | Performed by: ORTHOPAEDIC SURGERY

## 2019-05-13 PROCEDURE — 99999 PR PBB SHADOW E&M-EST. PATIENT-LVL III: ICD-10-PCS | Mod: PBBFAC,,, | Performed by: NURSE PRACTITIONER

## 2019-05-13 PROCEDURE — 73502 X-RAY EXAM HIP UNI 2-3 VIEWS: CPT | Mod: TC,PN,LT

## 2019-05-13 PROCEDURE — 99214 OFFICE O/P EST MOD 30 MIN: CPT | Mod: S$GLB,,, | Performed by: NURSE PRACTITIONER

## 2019-05-13 RX ORDER — SERTRALINE HYDROCHLORIDE 25 MG/1
25 TABLET, FILM COATED ORAL DAILY
Qty: 30 TABLET | Refills: 11 | Status: SHIPPED | OUTPATIENT
Start: 2019-05-13 | End: 2019-06-06 | Stop reason: CLARIF

## 2019-05-13 NOTE — PROGRESS NOTES
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 8/7/2018    Hip arthritis 3/1/2016    Homocysteinemia 8/7/2018    Hyperlipidemia     Hypertension     Normocytic normochromic anemia 6/29/2018    PE (pulmonary thromboembolism) 06/09/2018    Pneumonia of right lower lobe due to infectious organism 9/11/2017    Thyroid disease     hypothyroid       Past Surgical History:   Procedure Laterality Date    APPENDECTOMY      CHOLECYSTECTOMY      ESOPHAGOGASTRODUODENOSCOPY (EGD) N/A 10/25/2017    Performed by F[facility] Flores [facility] at [facility] ENDO    FRACTURE SURGERY      right hip     HERNIA REPAIR      groin    HYSTERECTOMY      Insertion, Implantable Loop Recorder N/A 12/12/2018    Performed by Ashok Herbert MD at [facility] CATH LAB    PINNING, HIP, PERCUTANEOUS Left 3/23/2019    Performed by Jorje Hamlin MD at [facility] OR    VARICOSE VEIN SURGERY         Current Outpatient Medications   Medication Sig    acetaminophen (TYLENOL) 325 MG tablet Take 2 tablets (650 mg total) by mouth every 4 (four) hours as needed.    albuterol-ipratropium (DUO-NEB) 2.5 mg-0.5 mg/3 mL nebulizer solution USE ONE VIAL IN NEBULIZER EVERY 6 HOURS WHILE AWAKE    amLODIPine (NORVASC) 10 MG tablet TAKE ONE TABLET BY MOUTH ONCE DAILY    ascorbic acid (VITAMIN C) 500 MG tablet Take 500 mg by mouth once daily.      aspirin (ECOTRIN) 81 MG EC tablet Take 81 mg by mouth once daily.      calcium carbonate (OS-TASIA) 500 mg calcium (1,250 mg) tablet Take 1 tablet  by mouth 2 (two) times daily.      ELIQUIS 5 mg Tab TAKE 1 TABLET BY MOUTH TWICE DAILY    ferrous sulfate (FEOSOL) 325 mg (65 mg iron) Tab tablet Take 1 tablet (325 mg total) by mouth 2 (two) times daily with meals.    fish oil-omega-3 fatty acids 300-1,000 mg capsule Take 2 g by mouth every evening.     fluticasone (FLONASE) 50 mcg/actuation nasal spray 2 sprays by Each Nare route once daily.    fluticasone-salmeterol 232-14 mcg/actuation AePB Inhale 1 puff into the lungs 2 (two) times daily.    FOLBIC 2.5-25-2 mg Tab TAKE 1 TABLET BY MOUTH ONCE DAILY    furosemide (LASIX) 20 MG tablet Take 1 tablet on Tuesdays and Thursdays.    gabapentin (NEURONTIN) 300 MG capsule TAKE ONE CAPSULE BY MOUTH IN THE EVENING    HYDROcodone-acetaminophen (NORCO) 5-325 mg per tablet Take 1 tablet by mouth every 4 (four) hours as needed.    levothyroxine (SYNTHROID) 88 MCG tablet Take 1 tablet (88 mcg total) by mouth before breakfast.    magnesium oxide (MAG-OX) 400 mg tablet TAKE ONE TABLET BY MOUTH ONCE DAILY    montelukast (SINGULAIR) 10 mg tablet TAKE 1 TABLET BY MOUTH ONCE DAILY IN THE EVENING    multivitamin (THERAGRAN) tablet Take 1 tablet by mouth once daily.    nitroGLYCERIN (NITROSTAT) 0.4 MG SL tablet Place 1 tablet (0.4 mg total) under the tongue every 5 (five) minutes as needed for Chest pain. As needed (Patient taking differently: Place 0.4 mg under the tongue every 5 (five) minutes as needed for Chest pain. Seek medical help if pain is not relieved after the third dose.)    polyethylene glycol (GLYCOLAX) 17 gram PwPk Take 17 g by mouth 2 (two) times daily.    senna-docusate 8.6-50 mg (PERICOLACE) 8.6-50 mg per tablet Take 1 tablet by mouth 2 (two) times daily.    simvastatin (ZOCOR) 40 MG tablet TAKE ONE TABLET BY MOUTH ONCE DAILY IN THE EVENING    SITagliptin (JANUVIA) 25 MG Tab Take 1 tablet (25 mg total) by mouth once daily.    vitamin D 1000 units Tab Take 1 tablet (1,000 Units total) by mouth once  daily.     No current facility-administered medications for this visit.        Review of patient's allergies indicates:   Allergen Reactions    Carvedilol Other (See Comments)     Bradycardia and syncope    Boniva [ibandronate]     Codeine     Hydralazine analogues     Iodinated contrast- oral and iv dye Hives    Kionex [sodium polystyrene sulfonate] Diarrhea     From encounter 17 for diarrhea--not true allergy.    Lisinopril     Morphine        Family History   Problem Relation Age of Onset    Hypertension Mother     Heart disease Mother     Lung disease Father     Diabetes Sister     Diabetes Brother     Kidney disease Son        Social History     Socioeconomic History    Marital status: Legally      Spouse name: Not on file    Number of children: Not on file    Years of education: Not on file    Highest education level: Not on file   Occupational History    Not on file   Social Needs    Financial resource strain: Not on file    Food insecurity:     Worry: Not on file     Inability: Not on file    Transportation needs:     Medical: Not on file     Non-medical: Not on file   Tobacco Use    Smoking status: Former Smoker     Packs/day: 0.35     Years: 44.00     Pack years: 15.40     Types: Cigarettes     Last attempt to quit: 2015     Years since quittin.0    Smokeless tobacco: Never Used    Tobacco comment: 2015   Substance and Sexual Activity    Alcohol use: No     Alcohol/week: 0.0 oz    Drug use: No    Sexual activity: Never   Lifestyle    Physical activity:     Days per week: Not on file     Minutes per session: Not on file    Stress: Not on file   Relationships    Social connections:     Talks on phone: Not on file     Gets together: Not on file     Attends Gnosticist service: Not on file     Active member of club or organization: Not on file     Attends meetings of clubs or organizations: Not on file     Relationship status: Not on file   Other Topics  Concern    Not on file   Social History Narrative    Not on file       Chief Complaint:   Chief Complaint   Patient presents with    Post-op Evaluation     s/p left hip pinning 3/29/19        Date of surgery:  March 23, 2019    History of present illness:  88-year-old female who underwent left closed reduction percutaneous pinning of a femoral neck fracture 6 weeks ago.  Patient has no pain today. No wound issues.  Still has some pain with walking.  Pain at night when laying on that side.  Pain as high as an 8/10 at night.      Review of Systems:    Musculoskeletal:  See HPI        Physical Examination:    Vital Signs:    Vitals:    05/13/19 1347   BP: (!) 122/52   Pulse: 73       Body mass index is 22.5 kg/m².    This a well-developed, well nourished patient in no acute distress.  They are alert and oriented and cooperative to examination.  Pt.  Walks with a walker.    Examination left hip shows well-healed surgical incision. No erythema or drainage.  Minimal swelling.  Mild pain with range of motion of the hip.    X-rays:  X-rays left hip are ordered and reviewed which show percutaneous pinning of the left femoral neck.  No change compared to the last x-ray.      Assessment::  Status post CRPP left femoral neck fracture    Plan:  I reviewed the findings with her today.  Continue weight-bearing as tolerated with an assistive device.  Follow up in 6 weeks with another x-ray of the left hip.    This note was created using [employer] voice recognition software that occasionally misinterpreted phrases or words.

## 2019-05-13 NOTE — PROGRESS NOTES
Subjective:       Patient ID: Blaire Cage is a 88 y.o. female.    Chief Complaint: Depression    Ms. Cage presents today with concerns on depression. Recently fractured her hip, since then feels like she has lost her independence. Daughters are taking care of her and this is causing her a great deal of stress. Took zoloft in the past with good results and would like to try this again. Other than this, she is feeling great. Ambulating with walker. Sees Dr. Jo for nephrology, missed most recent appointment due to surgery. Denies taking NSAIDs. Has weekly CBCs for Dr. Alvarez.     Patient Active Problem List   Diagnosis    Diabetic neuropathy, type II diabetes mellitus    CKD (chronic kidney disease), stage III, eGFR 39, progressive 31    Hypothyroidism    Hypertension associated with diabetes    Hyperlipidemia associated with type 2 diabetes mellitus    Type 2 diabetes mellitus with stage 3 chronic kidney disease    Right carotid bruit    COPD mixed type    Anxiety    Tobacco dependence    Abdominal aortic aneurysm without rupture    Hip arthritis    Degenerative spinal arthritis    Osteoporosis    Left ventricular diastolic dysfunction with preserved systolic function    Hypertensive left ventricular hypertrophy, without heart failure    Smoker, quit 5/2016, 25 pck-years    Abdominal obesity    Absolute anemia    Body mass index (BMI) 23.0-23.9, adult, today 24.1    Iron deficiency anemia due to chronic blood loss    Proteinuria due to type 2 diabetes mellitus    History of syncope in childhood    Prerenal azotemia    Drug-induced constipation    COPD with acute exacerbation    Asthmatic bronchitis with acute exacerbation    Shortness of breath    Controlled type 2 diabetes mellitus, without long-term current use of insulin    Acute pulmonary embolism    SOB (shortness of breath)    Asthma-COPD overlap syndrome    Eosinophilic asthma    Moderate malnutrition     Debility    Type 2 diabetes mellitus with kidney complication, without long-term current use of insulin    Chronic anticoagulation    Constipation    DVT (deep venous thrombosis)    History of pulmonary embolism    Anemia due to multiple mechanisms    Anemia, chronic disease    Normocytic normochromic anemia    Anemia, chronic renal failure, stage 3 (moderate)    Homocysteinemia    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    Hyperkalemia    Diastolic CHF, chronic    Anemia of chronic disease    Kidney stones    Macrocytic anemia    Recurrent syncope    Orthostatic hypotension    Closed left hip fracture    Hip pain, left     Review of Systems   Constitutional: Negative for activity change, appetite change, fatigue and fever.   Respiratory: Negative for cough, chest tightness, shortness of breath and wheezing.    Cardiovascular: Negative for chest pain, palpitations and leg swelling.   Genitourinary: Negative for decreased urine volume, difficulty urinating, dysuria, frequency, hematuria and urgency.   Musculoskeletal: Positive for arthralgias.   Neurological: Negative for dizziness, weakness and headaches.   Psychiatric/Behavioral: Positive for dysphoric mood.       Objective:      Physical Exam   Constitutional: She is oriented to person, place, and time.   Cardiovascular: Normal rate, regular rhythm and normal heart sounds.   Pulmonary/Chest: Effort normal and breath sounds normal. She has no wheezes.   Musculoskeletal: She exhibits no edema.   Ambulating with walker    Neurological: She is alert and oriented to person, place, and time.   Skin: Skin is warm and dry.   Psychiatric: She has a normal mood and affect.   Nursing note and vitals reviewed.      Assessment:       1. Depression, unspecified depression type    2. CKD (chronic kidney disease) stage 4, GFR 15-29 ml/min        Plan:       Blaire was seen today for depression.    Diagnoses and all orders for this visit:    Depression, unspecified  depression type  restart  -     sertraline (ZOLOFT) 25 MG tablet; Take 1 tablet (25 mg total) by mouth once daily.  I discussed with the patient the risks, side effects and the benefits of the medication including the black box warning regarding suicidal ideation/risk if applicable.  I counseled the patient on medication titration, length of time before maximum benefits are reached, and duration of treatment expected.  I advised the patient to return to clinic or go to the emergency department if suicidal thoughts occur, thought of hurting others, hallucinations, or other serious symptoms.  Patient voiced no intention of self-harm.  The patient expressed verbal understanding and elected to proceed with treatment.  All questions were answered.    F/U 1 month    CKD (chronic kidney disease) stage 4, GFR 15-29 ml/min  -     Comprehensive metabolic panel; Future  Recent decline in kidney function on labs  Educated to avoid NSAIDs, increase fluids     Hypertension associated with diabetes  Controlled on current drug regimen    Closed fracture of left hip with routine healing, subsequent encounter  Continue under the care of ortho today.

## 2019-05-14 ENCOUNTER — CLINICAL SUPPORT (OUTPATIENT)
Dept: CARDIOLOGY | Facility: CLINIC | Age: 84
End: 2019-05-14
Attending: INTERNAL MEDICINE
Payer: MEDICARE

## 2019-05-14 DIAGNOSIS — N17.9 AKI (ACUTE KIDNEY INJURY): ICD-10-CM

## 2019-05-14 DIAGNOSIS — Z95.818 STATUS POST PLACEMENT OF IMPLANTABLE LOOP RECORDER: ICD-10-CM

## 2019-05-14 DIAGNOSIS — R55 RECURRENT SYNCOPE: ICD-10-CM

## 2019-05-14 DIAGNOSIS — N18.4 CKD (CHRONIC KIDNEY DISEASE) STAGE 4, GFR 15-29 ML/MIN: Primary | ICD-10-CM

## 2019-05-21 DIAGNOSIS — Z95.818 STATUS POST PLACEMENT OF IMPLANTABLE LOOP RECORDER: Primary | ICD-10-CM

## 2019-05-21 DIAGNOSIS — R55 RECURRENT SYNCOPE: ICD-10-CM

## 2019-05-22 ENCOUNTER — LAB VISIT (OUTPATIENT)
Dept: LAB | Facility: HOSPITAL | Age: 84
End: 2019-05-22
Attending: INTERNAL MEDICINE
Payer: MEDICARE

## 2019-05-22 DIAGNOSIS — D53.9 MACROCYTIC ANEMIA: ICD-10-CM

## 2019-05-22 DIAGNOSIS — D64.9 NORMOCYTIC NORMOCHROMIC ANEMIA: ICD-10-CM

## 2019-05-22 DIAGNOSIS — I82.4Y2 DEEP VEIN THROMBOSIS (DVT) OF PROXIMAL VEIN OF LEFT LOWER EXTREMITY, UNSPECIFIED CHRONICITY: ICD-10-CM

## 2019-05-22 DIAGNOSIS — N18.4 CKD (CHRONIC KIDNEY DISEASE) STAGE 4, GFR 15-29 ML/MIN: ICD-10-CM

## 2019-05-22 DIAGNOSIS — N18.30 ANEMIA, CHRONIC RENAL FAILURE, STAGE 3 (MODERATE): ICD-10-CM

## 2019-05-22 DIAGNOSIS — D63.1 ANEMIA, CHRONIC RENAL FAILURE, STAGE 3 (MODERATE): ICD-10-CM

## 2019-05-22 DIAGNOSIS — Z15.89 HETEROZYGOUS MTHFR MUTATION C677T: ICD-10-CM

## 2019-05-22 DIAGNOSIS — D50.0 IRON DEFICIENCY ANEMIA DUE TO CHRONIC BLOOD LOSS: ICD-10-CM

## 2019-05-22 DIAGNOSIS — Z86.711 HISTORY OF PULMONARY EMBOLISM: ICD-10-CM

## 2019-05-22 DIAGNOSIS — D63.8 ANEMIA, CHRONIC DISEASE: ICD-10-CM

## 2019-05-22 DIAGNOSIS — N17.9 AKI (ACUTE KIDNEY INJURY): ICD-10-CM

## 2019-05-22 DIAGNOSIS — D64.89 ANEMIA DUE TO MULTIPLE MECHANISMS: ICD-10-CM

## 2019-05-22 DIAGNOSIS — Z79.01 CHRONIC ANTICOAGULATION: ICD-10-CM

## 2019-05-22 DIAGNOSIS — I26.99 OTHER ACUTE PULMONARY EMBOLISM WITHOUT ACUTE COR PULMONALE: ICD-10-CM

## 2019-05-22 DIAGNOSIS — D64.9 ANEMIA, UNSPECIFIED TYPE: ICD-10-CM

## 2019-05-22 DIAGNOSIS — R79.83 HOMOCYSTEINEMIA: ICD-10-CM

## 2019-05-22 LAB
ALBUMIN SERPL BCP-MCNC: 3.2 G/DL (ref 3.5–5.2)
ALP SERPL-CCNC: 61 U/L (ref 55–135)
ALT SERPL W/O P-5'-P-CCNC: 17 U/L (ref 10–44)
ANION GAP SERPL CALC-SCNC: 10 MMOL/L (ref 8–16)
ANION GAP SERPL CALC-SCNC: 10 MMOL/L (ref 8–16)
AST SERPL-CCNC: 24 U/L (ref 10–40)
BASOPHILS # BLD AUTO: 0.03 K/UL (ref 0–0.2)
BASOPHILS # BLD AUTO: 0.03 K/UL (ref 0–0.2)
BASOPHILS NFR BLD: 0.5 % (ref 0–1.9)
BASOPHILS NFR BLD: 0.5 % (ref 0–1.9)
BILIRUB SERPL-MCNC: 0.2 MG/DL (ref 0.1–1)
BUN SERPL-MCNC: 27 MG/DL (ref 8–23)
BUN SERPL-MCNC: 27 MG/DL (ref 8–23)
CALCIUM SERPL-MCNC: 9.3 MG/DL (ref 8.7–10.5)
CALCIUM SERPL-MCNC: 9.3 MG/DL (ref 8.7–10.5)
CHLORIDE SERPL-SCNC: 104 MMOL/L (ref 95–110)
CHLORIDE SERPL-SCNC: 104 MMOL/L (ref 95–110)
CO2 SERPL-SCNC: 24 MMOL/L (ref 23–29)
CO2 SERPL-SCNC: 24 MMOL/L (ref 23–29)
CREAT SERPL-MCNC: 1.6 MG/DL (ref 0.5–1.4)
CREAT SERPL-MCNC: 1.6 MG/DL (ref 0.5–1.4)
DIFFERENTIAL METHOD: ABNORMAL
DIFFERENTIAL METHOD: ABNORMAL
EOSINOPHIL # BLD AUTO: 0.3 K/UL (ref 0–0.5)
EOSINOPHIL # BLD AUTO: 0.3 K/UL (ref 0–0.5)
EOSINOPHIL NFR BLD: 5.2 % (ref 0–8)
EOSINOPHIL NFR BLD: 5.2 % (ref 0–8)
ERYTHROCYTE [DISTWIDTH] IN BLOOD BY AUTOMATED COUNT: 13.2 % (ref 11.5–14.5)
ERYTHROCYTE [DISTWIDTH] IN BLOOD BY AUTOMATED COUNT: 13.2 % (ref 11.5–14.5)
EST. GFR  (AFRICAN AMERICAN): 32.9 ML/MIN/1.73 M^2
EST. GFR  (AFRICAN AMERICAN): 32.9 ML/MIN/1.73 M^2
EST. GFR  (NON AFRICAN AMERICAN): 28.6 ML/MIN/1.73 M^2
EST. GFR  (NON AFRICAN AMERICAN): 28.6 ML/MIN/1.73 M^2
FERRITIN SERPL-MCNC: 211 NG/ML (ref 20–300)
GLUCOSE SERPL-MCNC: 99 MG/DL (ref 70–110)
GLUCOSE SERPL-MCNC: 99 MG/DL (ref 70–110)
HCT VFR BLD AUTO: 26.6 % (ref 37–48.5)
HCT VFR BLD AUTO: 26.6 % (ref 37–48.5)
HGB BLD-MCNC: 8.2 G/DL (ref 12–16)
HGB BLD-MCNC: 8.2 G/DL (ref 12–16)
IMM GRANULOCYTES # BLD AUTO: 0.02 K/UL (ref 0–0.04)
IMM GRANULOCYTES # BLD AUTO: 0.02 K/UL (ref 0–0.04)
IMM GRANULOCYTES NFR BLD AUTO: 0.3 % (ref 0–0.5)
IMM GRANULOCYTES NFR BLD AUTO: 0.3 % (ref 0–0.5)
IRON SERPL-MCNC: 54 UG/DL (ref 30–160)
LYMPHOCYTES # BLD AUTO: 1.4 K/UL (ref 1–4.8)
LYMPHOCYTES # BLD AUTO: 1.4 K/UL (ref 1–4.8)
LYMPHOCYTES NFR BLD: 23.3 % (ref 18–48)
LYMPHOCYTES NFR BLD: 23.3 % (ref 18–48)
MCH RBC QN AUTO: 31.4 PG (ref 27–31)
MCH RBC QN AUTO: 31.4 PG (ref 27–31)
MCHC RBC AUTO-ENTMCNC: 30.8 G/DL (ref 32–36)
MCHC RBC AUTO-ENTMCNC: 30.8 G/DL (ref 32–36)
MCV RBC AUTO: 102 FL (ref 82–98)
MCV RBC AUTO: 102 FL (ref 82–98)
MONOCYTES # BLD AUTO: 0.6 K/UL (ref 0.3–1)
MONOCYTES # BLD AUTO: 0.6 K/UL (ref 0.3–1)
MONOCYTES NFR BLD: 9.5 % (ref 4–15)
MONOCYTES NFR BLD: 9.5 % (ref 4–15)
NEUTROPHILS # BLD AUTO: 3.5 K/UL (ref 1.8–7.7)
NEUTROPHILS # BLD AUTO: 3.5 K/UL (ref 1.8–7.7)
NEUTROPHILS NFR BLD: 61.2 % (ref 38–73)
NEUTROPHILS NFR BLD: 61.2 % (ref 38–73)
NRBC BLD-RTO: 0 /100 WBC
NRBC BLD-RTO: 0 /100 WBC
PLATELET # BLD AUTO: 197 K/UL (ref 150–350)
PLATELET # BLD AUTO: 197 K/UL (ref 150–350)
PMV BLD AUTO: 11.5 FL (ref 9.2–12.9)
PMV BLD AUTO: 11.5 FL (ref 9.2–12.9)
POTASSIUM SERPL-SCNC: 4.6 MMOL/L (ref 3.5–5.1)
POTASSIUM SERPL-SCNC: 4.6 MMOL/L (ref 3.5–5.1)
PROT SERPL-MCNC: 6.2 G/DL (ref 6–8.4)
RBC # BLD AUTO: 2.61 M/UL (ref 4–5.4)
RBC # BLD AUTO: 2.61 M/UL (ref 4–5.4)
SATURATED IRON: 21 % (ref 20–50)
SODIUM SERPL-SCNC: 138 MMOL/L (ref 136–145)
SODIUM SERPL-SCNC: 138 MMOL/L (ref 136–145)
TOTAL IRON BINDING CAPACITY: 252 UG/DL (ref 250–450)
TRANSFERRIN SERPL-MCNC: 170 MG/DL (ref 200–375)
WBC # BLD AUTO: 5.79 K/UL (ref 3.9–12.7)
WBC # BLD AUTO: 5.79 K/UL (ref 3.9–12.7)

## 2019-05-22 PROCEDURE — 83090 ASSAY OF HOMOCYSTEINE: CPT

## 2019-05-22 PROCEDURE — 85025 COMPLETE CBC W/AUTO DIFF WBC: CPT

## 2019-05-22 PROCEDURE — 83540 ASSAY OF IRON: CPT

## 2019-05-22 PROCEDURE — 80053 COMPREHEN METABOLIC PANEL: CPT

## 2019-05-22 PROCEDURE — 36415 COLL VENOUS BLD VENIPUNCTURE: CPT | Mod: PO

## 2019-05-22 PROCEDURE — 82728 ASSAY OF FERRITIN: CPT

## 2019-05-22 PROCEDURE — 82746 ASSAY OF FOLIC ACID SERUM: CPT

## 2019-05-22 PROCEDURE — 82607 VITAMIN B-12: CPT

## 2019-05-23 ENCOUNTER — TELEPHONE (OUTPATIENT)
Dept: FAMILY MEDICINE | Facility: CLINIC | Age: 84
End: 2019-05-23

## 2019-05-23 LAB
FOLATE SERPL-MCNC: >40 NG/ML (ref 4–24)
HCYS SERPL-SCNC: 9.6 UMOL/L (ref 4–15.5)
VIT B12 SERPL-MCNC: >2000 PG/ML (ref 210–950)

## 2019-05-23 NOTE — TELEPHONE ENCOUNTER
----- Message from Erica Sarmiento NP sent at 5/23/2019  8:03 AM CDT -----  Kidney function looks better than 10 days ago. Did she schedule to see nephrology?

## 2019-05-28 ENCOUNTER — LAB VISIT (OUTPATIENT)
Dept: LAB | Facility: HOSPITAL | Age: 84
End: 2019-05-28
Attending: INTERNAL MEDICINE
Payer: MEDICARE

## 2019-05-28 DIAGNOSIS — Z15.89 HETEROZYGOUS MTHFR MUTATION C677T: ICD-10-CM

## 2019-05-28 DIAGNOSIS — Z79.01 CHRONIC ANTICOAGULATION: ICD-10-CM

## 2019-05-28 DIAGNOSIS — D63.1 ANEMIA, CHRONIC RENAL FAILURE, STAGE 3 (MODERATE): ICD-10-CM

## 2019-05-28 DIAGNOSIS — D50.0 IRON DEFICIENCY ANEMIA DUE TO CHRONIC BLOOD LOSS: ICD-10-CM

## 2019-05-28 DIAGNOSIS — D64.9 ANEMIA, UNSPECIFIED TYPE: ICD-10-CM

## 2019-05-28 DIAGNOSIS — D53.9 MACROCYTIC ANEMIA: ICD-10-CM

## 2019-05-28 DIAGNOSIS — Z86.711 HISTORY OF PULMONARY EMBOLISM: ICD-10-CM

## 2019-05-28 DIAGNOSIS — R79.83 HOMOCYSTEINEMIA: ICD-10-CM

## 2019-05-28 DIAGNOSIS — D64.89 ANEMIA DUE TO MULTIPLE MECHANISMS: ICD-10-CM

## 2019-05-28 DIAGNOSIS — I26.99 OTHER ACUTE PULMONARY EMBOLISM WITHOUT ACUTE COR PULMONALE: ICD-10-CM

## 2019-05-28 DIAGNOSIS — D64.9 NORMOCYTIC NORMOCHROMIC ANEMIA: ICD-10-CM

## 2019-05-28 DIAGNOSIS — D63.8 ANEMIA, CHRONIC DISEASE: ICD-10-CM

## 2019-05-28 DIAGNOSIS — N18.30 ANEMIA, CHRONIC RENAL FAILURE, STAGE 3 (MODERATE): ICD-10-CM

## 2019-05-28 DIAGNOSIS — I82.4Y2 DEEP VEIN THROMBOSIS (DVT) OF PROXIMAL VEIN OF LEFT LOWER EXTREMITY, UNSPECIFIED CHRONICITY: ICD-10-CM

## 2019-05-28 LAB
ALBUMIN SERPL BCP-MCNC: 3.2 G/DL (ref 3.5–5.2)
ALP SERPL-CCNC: 60 U/L (ref 55–135)
ALT SERPL W/O P-5'-P-CCNC: 14 U/L (ref 10–44)
ANION GAP SERPL CALC-SCNC: 8 MMOL/L (ref 8–16)
AST SERPL-CCNC: 22 U/L (ref 10–40)
BASOPHILS # BLD AUTO: 0.03 K/UL (ref 0–0.2)
BASOPHILS NFR BLD: 0.5 % (ref 0–1.9)
BILIRUB SERPL-MCNC: 0.2 MG/DL (ref 0.1–1)
BUN SERPL-MCNC: 46 MG/DL (ref 8–23)
CALCIUM SERPL-MCNC: 9.6 MG/DL (ref 8.7–10.5)
CHLORIDE SERPL-SCNC: 106 MMOL/L (ref 95–110)
CO2 SERPL-SCNC: 26 MMOL/L (ref 23–29)
CREAT SERPL-MCNC: 1.6 MG/DL (ref 0.5–1.4)
DIFFERENTIAL METHOD: ABNORMAL
EOSINOPHIL # BLD AUTO: 0.3 K/UL (ref 0–0.5)
EOSINOPHIL NFR BLD: 4.5 % (ref 0–8)
ERYTHROCYTE [DISTWIDTH] IN BLOOD BY AUTOMATED COUNT: 13.2 % (ref 11.5–14.5)
EST. GFR  (AFRICAN AMERICAN): 32.9 ML/MIN/1.73 M^2
EST. GFR  (NON AFRICAN AMERICAN): 28.6 ML/MIN/1.73 M^2
FERRITIN SERPL-MCNC: 204 NG/ML (ref 20–300)
GLUCOSE SERPL-MCNC: 100 MG/DL (ref 70–110)
HCT VFR BLD AUTO: 27.3 % (ref 37–48.5)
HGB BLD-MCNC: 8.3 G/DL (ref 12–16)
IMM GRANULOCYTES # BLD AUTO: 0.02 K/UL (ref 0–0.04)
IMM GRANULOCYTES NFR BLD AUTO: 0.3 % (ref 0–0.5)
IRON SERPL-MCNC: 44 UG/DL (ref 30–160)
LYMPHOCYTES # BLD AUTO: 1.3 K/UL (ref 1–4.8)
LYMPHOCYTES NFR BLD: 22.4 % (ref 18–48)
MCH RBC QN AUTO: 31.3 PG (ref 27–31)
MCHC RBC AUTO-ENTMCNC: 30.4 G/DL (ref 32–36)
MCV RBC AUTO: 103 FL (ref 82–98)
MONOCYTES # BLD AUTO: 0.7 K/UL (ref 0.3–1)
MONOCYTES NFR BLD: 11.3 % (ref 4–15)
NEUTROPHILS # BLD AUTO: 3.5 K/UL (ref 1.8–7.7)
NEUTROPHILS NFR BLD: 61 % (ref 38–73)
NRBC BLD-RTO: 0 /100 WBC
PLATELET # BLD AUTO: 154 K/UL (ref 150–350)
PMV BLD AUTO: 11.5 FL (ref 9.2–12.9)
POTASSIUM SERPL-SCNC: 4.6 MMOL/L (ref 3.5–5.1)
PROT SERPL-MCNC: 6.3 G/DL (ref 6–8.4)
RBC # BLD AUTO: 2.65 M/UL (ref 4–5.4)
SATURATED IRON: 16 % (ref 20–50)
SODIUM SERPL-SCNC: 140 MMOL/L (ref 136–145)
TOTAL IRON BINDING CAPACITY: 274 UG/DL (ref 250–450)
TRANSFERRIN SERPL-MCNC: 185 MG/DL (ref 200–375)
WBC # BLD AUTO: 5.75 K/UL (ref 3.9–12.7)

## 2019-05-28 PROCEDURE — 82746 ASSAY OF FOLIC ACID SERUM: CPT

## 2019-05-28 PROCEDURE — 36415 COLL VENOUS BLD VENIPUNCTURE: CPT | Mod: PO

## 2019-05-28 PROCEDURE — 83540 ASSAY OF IRON: CPT

## 2019-05-28 PROCEDURE — 82607 VITAMIN B-12: CPT

## 2019-05-28 PROCEDURE — 85025 COMPLETE CBC W/AUTO DIFF WBC: CPT

## 2019-05-28 PROCEDURE — 82728 ASSAY OF FERRITIN: CPT

## 2019-05-28 PROCEDURE — 80053 COMPREHEN METABOLIC PANEL: CPT

## 2019-05-29 LAB
FOLATE SERPL-MCNC: 18.7 NG/ML (ref 4–24)
VIT B12 SERPL-MCNC: >2000 PG/ML (ref 210–950)

## 2019-05-30 ENCOUNTER — TELEPHONE (OUTPATIENT)
Dept: PULMONOLOGY | Facility: CLINIC | Age: 84
End: 2019-05-30

## 2019-05-30 DIAGNOSIS — J20.9 COPD (CHRONIC OBSTRUCTIVE PULMONARY DISEASE) WITH ACUTE BRONCHITIS: ICD-10-CM

## 2019-05-30 DIAGNOSIS — J44.0 COPD (CHRONIC OBSTRUCTIVE PULMONARY DISEASE) WITH ACUTE BRONCHITIS: ICD-10-CM

## 2019-05-30 DIAGNOSIS — J82.83 EOSINOPHILIC ASTHMA: ICD-10-CM

## 2019-05-30 DIAGNOSIS — J44.89 ASTHMA-COPD OVERLAP SYNDROME: ICD-10-CM

## 2019-05-30 RX ORDER — PREDNISONE 20 MG/1
TABLET ORAL
Qty: 15 TABLET | Refills: 0 | Status: SHIPPED | OUTPATIENT
Start: 2019-05-30 | End: 2019-07-18 | Stop reason: SDUPTHER

## 2019-05-30 NOTE — TELEPHONE ENCOUNTER
Request sent to MD  ----- Message from Dorcas Davis sent at 5/30/2019  3:19 PM CDT -----  Type:  RX Refill Request    Who Called:  Toya Hendrickson  Refill or New Rx:  refill  RX Name and Strength:  Prednisone  How is the patient currently taking it? (ex. 1XDay):  NA  Is this a 30 day or 90 day RX:  NA  Preferred Pharmacy with phone number:    [id number] - [place], [place] - [address]  [place] [address]  Phone: [phone] Fax: [phone]  Local or Mail Order:  local  Ordering Provider:  Dr Jeffrey Christopher  [facility] Call Back Number:  [phone]  Additional Information:  Patient is coming down on her dosage /please call daughter to discuss

## 2019-06-03 ENCOUNTER — LAB VISIT (OUTPATIENT)
Dept: LAB | Facility: HOSPITAL | Age: 84
End: 2019-06-03
Attending: INTERNAL MEDICINE
Payer: MEDICARE

## 2019-06-03 DIAGNOSIS — D50.0 IRON DEFICIENCY ANEMIA DUE TO CHRONIC BLOOD LOSS: ICD-10-CM

## 2019-06-03 DIAGNOSIS — D63.8 ANEMIA, CHRONIC DISEASE: ICD-10-CM

## 2019-06-03 DIAGNOSIS — R79.83 HOMOCYSTEINEMIA: ICD-10-CM

## 2019-06-03 DIAGNOSIS — I82.4Y2 DEEP VEIN THROMBOSIS (DVT) OF PROXIMAL VEIN OF LEFT LOWER EXTREMITY, UNSPECIFIED CHRONICITY: ICD-10-CM

## 2019-06-03 DIAGNOSIS — I26.99 OTHER ACUTE PULMONARY EMBOLISM WITHOUT ACUTE COR PULMONALE: ICD-10-CM

## 2019-06-03 DIAGNOSIS — N18.30 ANEMIA, CHRONIC RENAL FAILURE, STAGE 3 (MODERATE): ICD-10-CM

## 2019-06-03 DIAGNOSIS — D63.1 ANEMIA, CHRONIC RENAL FAILURE, STAGE 3 (MODERATE): ICD-10-CM

## 2019-06-03 DIAGNOSIS — Z79.01 CHRONIC ANTICOAGULATION: ICD-10-CM

## 2019-06-03 DIAGNOSIS — Z86.711 HISTORY OF PULMONARY EMBOLISM: ICD-10-CM

## 2019-06-03 DIAGNOSIS — D64.9 ANEMIA, UNSPECIFIED TYPE: ICD-10-CM

## 2019-06-03 DIAGNOSIS — Z15.89 HETEROZYGOUS MTHFR MUTATION C677T: ICD-10-CM

## 2019-06-03 DIAGNOSIS — D53.9 MACROCYTIC ANEMIA: ICD-10-CM

## 2019-06-03 DIAGNOSIS — D64.9 NORMOCYTIC NORMOCHROMIC ANEMIA: ICD-10-CM

## 2019-06-03 DIAGNOSIS — D64.89 ANEMIA DUE TO MULTIPLE MECHANISMS: ICD-10-CM

## 2019-06-03 LAB
ALBUMIN SERPL BCP-MCNC: 3.6 G/DL (ref 3.5–5.2)
ALP SERPL-CCNC: 64 U/L (ref 55–135)
ALT SERPL W/O P-5'-P-CCNC: 16 U/L (ref 10–44)
ANION GAP SERPL CALC-SCNC: 6 MMOL/L (ref 8–16)
AST SERPL-CCNC: 23 U/L (ref 10–40)
BASOPHILS # BLD AUTO: 0.03 K/UL (ref 0–0.2)
BASOPHILS NFR BLD: 0.4 % (ref 0–1.9)
BILIRUB SERPL-MCNC: 0.2 MG/DL (ref 0.1–1)
BUN SERPL-MCNC: 40 MG/DL (ref 8–23)
CALCIUM SERPL-MCNC: 9.8 MG/DL (ref 8.7–10.5)
CHLORIDE SERPL-SCNC: 105 MMOL/L (ref 95–110)
CO2 SERPL-SCNC: 25 MMOL/L (ref 23–29)
CREAT SERPL-MCNC: 1.3 MG/DL (ref 0.5–1.4)
DIFFERENTIAL METHOD: ABNORMAL
EOSINOPHIL # BLD AUTO: 0 K/UL (ref 0–0.5)
EOSINOPHIL NFR BLD: 0 % (ref 0–8)
ERYTHROCYTE [DISTWIDTH] IN BLOOD BY AUTOMATED COUNT: 13.4 % (ref 11.5–14.5)
EST. GFR  (AFRICAN AMERICAN): 42.3 ML/MIN/1.73 M^2
EST. GFR  (NON AFRICAN AMERICAN): 36.7 ML/MIN/1.73 M^2
FERRITIN SERPL-MCNC: 205 NG/ML (ref 20–300)
GLUCOSE SERPL-MCNC: 75 MG/DL (ref 70–110)
HCT VFR BLD AUTO: 28.3 % (ref 37–48.5)
HGB BLD-MCNC: 8.9 G/DL (ref 12–16)
IMM GRANULOCYTES # BLD AUTO: 0.02 K/UL (ref 0–0.04)
IMM GRANULOCYTES NFR BLD AUTO: 0.3 % (ref 0–0.5)
IRON SERPL-MCNC: 64 UG/DL (ref 30–160)
LYMPHOCYTES # BLD AUTO: 1 K/UL (ref 1–4.8)
LYMPHOCYTES NFR BLD: 13.3 % (ref 18–48)
MCH RBC QN AUTO: 31.4 PG (ref 27–31)
MCHC RBC AUTO-ENTMCNC: 31.4 G/DL (ref 32–36)
MCV RBC AUTO: 100 FL (ref 82–98)
MONOCYTES # BLD AUTO: 0.5 K/UL (ref 0.3–1)
MONOCYTES NFR BLD: 6.3 % (ref 4–15)
NEUTROPHILS # BLD AUTO: 5.9 K/UL (ref 1.8–7.7)
NEUTROPHILS NFR BLD: 79.7 % (ref 38–73)
NRBC BLD-RTO: 0 /100 WBC
PLATELET # BLD AUTO: 188 K/UL (ref 150–350)
PMV BLD AUTO: 11.4 FL (ref 9.2–12.9)
POTASSIUM SERPL-SCNC: 5.5 MMOL/L (ref 3.5–5.1)
PROT SERPL-MCNC: 6.8 G/DL (ref 6–8.4)
RBC # BLD AUTO: 2.83 M/UL (ref 4–5.4)
SATURATED IRON: 21 % (ref 20–50)
SODIUM SERPL-SCNC: 136 MMOL/L (ref 136–145)
TOTAL IRON BINDING CAPACITY: 306 UG/DL (ref 250–450)
TRANSFERRIN SERPL-MCNC: 207 MG/DL (ref 200–375)
WBC # BLD AUTO: 7.45 K/UL (ref 3.9–12.7)

## 2019-06-03 PROCEDURE — 80053 COMPREHEN METABOLIC PANEL: CPT

## 2019-06-03 PROCEDURE — 36415 COLL VENOUS BLD VENIPUNCTURE: CPT | Mod: PO

## 2019-06-03 PROCEDURE — 82607 VITAMIN B-12: CPT

## 2019-06-03 PROCEDURE — 82746 ASSAY OF FOLIC ACID SERUM: CPT

## 2019-06-03 PROCEDURE — 82728 ASSAY OF FERRITIN: CPT

## 2019-06-03 PROCEDURE — 83540 ASSAY OF IRON: CPT

## 2019-06-03 PROCEDURE — 85025 COMPLETE CBC W/AUTO DIFF WBC: CPT

## 2019-06-04 LAB
FOLATE SERPL-MCNC: >40 NG/ML (ref 4–24)
VIT B12 SERPL-MCNC: >2000 PG/ML (ref 210–950)

## 2019-06-04 NOTE — PROGRESS NOTES
[facility] Hematology/Oncology  PROGRESS NOTE        Subjective:       Patient ID:   NAME: Blaire Cage : 1930     88 y.o. female    Referring Doc: Sandro  Other Physicians: Cande Garg (PA); Eli Edmonds (PCP); Jeffrey Christopher (Pulm); Sandra (GI)    Chief Complaint:  DVT and PE f/u    History of Present Illness:     Patient returns today for a  regularly scheduled follow-up visit.  The patient is here today to go over the results of the recently ordered labs, tests and studies. She is here with her daughter. She is breathing ok. She denies any swelling in the legs. She denies any CP, SOB, HA's. She had some stomach issues with some N/V and diarrhea. She is walking with use of walker. She sees Dr Flores with GI this Friday.         ROS:   GEN: normal without any fever, night sweats or weight loss  HEENT: normal with no HA's, sore throat, stiff neck, changes in vision  CV: normal with no CP, SOB, PND, WSET or orthopnea  PULM: normal with no SOB, cough, hemoptysis, sputum or pleuritic pain  GI: some recent N/V, and diarrhea  : normal with no hematuria, dysuria  BREAST: normal with no mass, discharge, pain  SKIN: normal with no rash, erythema, bruising, or swelling    Allergies:  Review of patient's allergies indicates:   Allergen Reactions    Carvedilol Other (See Comments)     Bradycardia and syncope    Boniva [ibandronate]     Codeine     Hydralazine analogues     Iodinated contrast- oral and iv dye Hives    Kionex [sodium polystyrene sulfonate] Diarrhea     From encounter 17 for diarrhea--not true allergy.    Lisinopril     Morphine        Medications:    Current Outpatient Medications:     acetaminophen (TYLENOL) 325 MG tablet, Take 2 tablets (650 mg total) by mouth every 4 (four) hours as needed., Disp: , Rfl: 0    albuterol-ipratropium (DUO-NEB) 2.5 mg-0.5 mg/3 mL nebulizer solution, USE ONE VIAL IN NEBULIZER EVERY 6 HOURS WHILE AWAKE, Disp: 120 vial, Rfl: 5    amLODIPine (NORVASC) 10 MG  tablet, TAKE ONE TABLET BY MOUTH ONCE DAILY, Disp: 90 tablet, Rfl: 3    ascorbic acid (VITAMIN C) 500 MG tablet, Take 500 mg by mouth once daily.  , Disp: , Rfl:     aspirin (ECOTRIN) 81 MG EC tablet, Take 81 mg by mouth once daily.  , Disp: , Rfl:     calcium carbonate (OS-TASIA) 500 mg calcium (1,250 mg) tablet, Take 1 tablet by mouth 2 (two) times daily.  , Disp: , Rfl:     ELIQUIS 5 mg Tab, TAKE 1 TABLET BY MOUTH TWICE DAILY, Disp: 60 tablet, Rfl: 5    ferrous sulfate (FEOSOL) 325 mg (65 mg iron) Tab tablet, Take 1 tablet (325 mg total) by mouth 2 (two) times daily with meals., Disp: 180 tablet, Rfl: 3    fish oil-omega-3 fatty acids 300-1,000 mg capsule, Take 2 g by mouth every evening. , Disp: , Rfl:     fluticasone (FLONASE) 50 mcg/actuation nasal spray, 2 sprays by Each Nare route once daily., Disp: 48 g, Rfl: 3    fluticasone-salmeterol 232-14 mcg/actuation AePB, Inhale 1 puff into the lungs 2 (two) times daily., Disp: 1 each, Rfl: 11    FOLBIC 2.5-25-2 mg Tab, TAKE 1 TABLET BY MOUTH ONCE DAILY, Disp: 30 tablet, Rfl: 6    furosemide (LASIX) 20 MG tablet, Take 1 tablet on Tuesdays and Thursdays., Disp: 24 tablet, Rfl: 1    gabapentin (NEURONTIN) 300 MG capsule, TAKE ONE CAPSULE BY MOUTH IN THE EVENING, Disp: 90 capsule, Rfl: 3    HYDROcodone-acetaminophen (NORCO) 5-325 mg per tablet, Take 1 tablet by mouth every 4 (four) hours as needed., Disp: 10 tablet, Rfl: 0    levothyroxine (SYNTHROID) 88 MCG tablet, Take 1 tablet (88 mcg total) by mouth before breakfast., Disp: 90 tablet, Rfl: 3    magnesium oxide (MAG-OX) 400 mg tablet, TAKE ONE TABLET BY MOUTH ONCE DAILY, Disp: 90 tablet, Rfl: 3    montelukast (SINGULAIR) 10 mg tablet, TAKE 1 TABLET BY MOUTH ONCE DAILY IN THE EVENING, Disp: 90 tablet, Rfl: 3    multivitamin (THERAGRAN) tablet, Take 1 tablet by mouth once daily., Disp: , Rfl:     nitroGLYCERIN (NITROSTAT) 0.4 MG SL tablet, Place 1 tablet (0.4 mg total) under the tongue every 5 (five)  minutes as needed for Chest pain. As needed (Patient taking differently: Place 0.4 mg under the tongue every 5 (five) minutes as needed for Chest pain. Seek medical help if pain is not relieved after the third dose.), Disp: 30 tablet, Rfl: 3    polyethylene glycol (GLYCOLAX) 17 gram PwPk, Take 17 g by mouth 2 (two) times daily., Disp: 60 packet, Rfl: 0    predniSONE (DELTASONE) 20 MG tablet, Take one daily for 3 days then repeat for bronchitis, Disp: 15 tablet, Rfl: 0    senna-docusate 8.6-50 mg (PERICOLACE) 8.6-50 mg per tablet, Take 1 tablet by mouth 2 (two) times daily., Disp: , Rfl:     sertraline (ZOLOFT) 25 MG tablet, Take 1 tablet (25 mg total) by mouth once daily., Disp: 30 tablet, Rfl: 11    simvastatin (ZOCOR) 40 MG tablet, TAKE ONE TABLET BY MOUTH ONCE DAILY IN THE EVENING, Disp: 90 tablet, Rfl: 3    SITagliptin (JANUVIA) 25 MG Tab, Take 1 tablet (25 mg total) by mouth once daily., Disp: 30 tablet, Rfl: 5    vitamin D 1000 units Tab, Take 1 tablet (1,000 Units total) by mouth once daily., Disp: 90 tablet, Rfl: 3    PMHx/PSHx Updates:  See patient's last visit with me on 5/8/2019.  See H&P on 6/29/2018        Pathology:  Cancer Staging  No matching staging information was found for the patient.          Objective:     Vitals:  Blood pressure (!) 138/55, pulse 62, temperature 97.7 °F (36.5 °C), temperature source Oral, resp. rate 20, weight 56.6 kg (124 lb 12.8 oz).    Physical Examination:   GEN: no apparent distress, comfortable; AAOx3  HEAD: atraumatic and normocephalic  EYES: no pallor, no icterus, PERRLA  ENT: OMM, no pharyngeal erythema, external ears WNL; no nasal discharge; no thrush  NECK: no masses, thyroid normal, trachea midline, no LAD/LN's, supple  CV: RRR with no murmur; normal pulse; normal S1 and S2; no pedal edema  CHEST: Normal respiratory effort; CTAB; normal breath sounds; no wheeze or crackles  ABDOM: nontender and nondistended; soft; normal bowel sounds; no  rebound/guarding  MUSC/Skeletal: ROM normal; no crepitus; joints normal; no deformities or arthropathy; no longer on walker  EXTREM: no clubbing, cyanosis, inflammation or swelling  SKIN: no rashes, lesions, ulcers, petechiae or subcutaneous nodules; vitiligo   : no carter  NEURO: grossly intact; motor/sensory WNL; AAOx3; no tremors  PSYCH: normal mood, affect and behavior  LYMPH: normal cervical, supraclavicular, axillary and groin LN's             Labs:     6/3/2019    Lab Results   Component Value Date    WBC 7.45 06/03/2019    HGB 8.9 (L) 06/03/2019    HCT 28.3 (L) 06/03/2019     (H) 06/03/2019     06/03/2019            BMP  Lab Results   Component Value Date     06/03/2019    K 5.5 (H) 06/03/2019     06/03/2019    CO2 25 06/03/2019    BUN 40 (H) 06/03/2019    CREATININE 1.3 06/03/2019    CALCIUM 9.8 06/03/2019    ANIONGAP 6 (L) 06/03/2019    ESTGFRAFRICA 42.3 (A) 06/03/2019    EGFRNONAA 36.7 (A) 06/03/2019       Lab Results   Component Value Date    IRON 64 06/03/2019    TIBC 306 06/03/2019    FERRITIN 205 06/03/2019     Lab Results   Component Value Date    GRIYWXUL15 >2000 (H) 06/03/2019     Lab Results   Component Value Date    FOLATE >40.0 (H) 06/03/2019           Radiology/Diagnostic Studies:    Ct Head Without Contrast    Result Date: 7/16/2018  EXAMINATION: CT HEAD WITHOUT CONTRAST CLINICAL HISTORY: Dizziness; TECHNIQUE: 5 mm noncontrast axial images were acquired through the head. COMPARISON: CT head without contrast-11/29/2014 FINDINGS: The brain is normally formed with preserved gray-white matter junction differentiation. No evidence of acute/recent major vascular territory cerebral infarction, parenchymal hemorrhage, or intra-axial mass.  There is age-appropriate cerebral volume loss with associated compensatory enlargement of the ventricular system and widening of the CSF spaces over both cerebral convexities.  There are confluent areas of periventricular white matter  hypoattenuation compatible with age-appropriate chronic microvascular ischemic changes. No hydrocephalus.  No effacement of the skull-base cisterns.  No extra-axial fluid collections or blood products. The paranasal sinuses and mastoid air cells are clear.  There is rightward bony nasal septal deviation.  The visualized orbits are unremarkable.  The bony calvarium and visualized facial bones show no acute abnormality.     No acute intracranial abnormality appreciated.  No interval detrimental change in the imaging appearance of the brain when compared to the previous study. Electronically signed by: Aubrey Davis MD Date:    07/16/2018 Time:    08:01    Radiology Report    Result Date: 7/15/2018  Ordered by an unspecified provider.      I have reviewed all available lab results and radiology reports.    Assessment/Plan:   (1) 88 y.o. female with diagnosis of a recent LLE DVT and Pulmonary emboli who has been referred by Dr Jo with Neph for evaluation by medical hematology/oncology. She was hospitalized [facility]. She has never had any clots before. No family hx/of clots.    - factor panel, protein C and S, ATIII adequate  - antiphosphatidyl negative; LA negative  - homocysteine elevated at 20.2  - MTHFR-C heterozygous positive - discussed the genetic implications with her and her daughter  - prothrombin II negative  - she is on eliquis bid      (2) COPD/asthma; former smoker     (3) Left ventricular dysfunction     (4) HTN and hypercholesterolemia     (5) CRI - stage III     (6) Anemia - most likely a multifactorial process with iron deficiency diagnosis, anemia of chronic disorders, anemia of chronic renal  - latest hgb is lower at 8.9 and a little better  - she is on iron and MVI  - iron panel is adequate    (7) DM and hypothyroidism    (8) Recent hip fracture - left - needed pin placement only            Deep vein thrombosis (DVT) of proximal vein of left lower extremity, unspecified chronicity    History of  pulmonary embolism    Chronic anticoagulation    Other acute pulmonary embolism without acute cor pulmonale    Normocytic normochromic anemia    Macrocytic anemia    Iron deficiency anemia due to chronic blood loss    Anemia, chronic renal failure, stage 3 (moderate)    Anemia, chronic disease    Anemia of chronic disease    Anemia due to multiple mechanisms    Anemia, unspecified type    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 Continue Folbic for the hyperhomocysteinemia  2. Continue eliquis and consider lifelong usage  3. Previously  discussed the genetic implications of the MTHFR-C in siblings and children  4. She is seeing Dr Flores with GI this Friday  5. F/u with PCP, GI etc  6. Check labs monthly for now (encouraged compliance)    RTC in  6-8 weeks  Fax note to Eli Edmonds MD; Sandra Jo and Ashwin    Discussion:       I spent over 25 mins of time with the patient. Reviewed results of the recently ordered labs, tests and studies; made directives with regards to the results. Over half of this time was spent couseling and coordinating care.    I have explained all of the above in detail and the patient understands all of the current recommendation(s). I have answered all of their questions to the best of my ability and to their complete satisfaction.   The patient is to continue with the current management plan.            Electronically signed by Issac Alvarez MD

## 2019-06-05 ENCOUNTER — PATIENT MESSAGE (OUTPATIENT)
Dept: FAMILY MEDICINE | Facility: CLINIC | Age: 84
End: 2019-06-05

## 2019-06-05 ENCOUNTER — OFFICE VISIT (OUTPATIENT)
Dept: HEMATOLOGY/ONCOLOGY | Facility: CLINIC | Age: 84
End: 2019-06-05
Payer: MEDICARE

## 2019-06-05 VITALS
SYSTOLIC BLOOD PRESSURE: 138 MMHG | BODY MASS INDEX: 22.11 KG/M2 | HEART RATE: 62 BPM | WEIGHT: 124.81 LBS | TEMPERATURE: 98 F | DIASTOLIC BLOOD PRESSURE: 55 MMHG | RESPIRATION RATE: 20 BRPM

## 2019-06-05 DIAGNOSIS — D64.89 ANEMIA DUE TO MULTIPLE MECHANISMS: ICD-10-CM

## 2019-06-05 DIAGNOSIS — Z86.711 HISTORY OF PULMONARY EMBOLISM: ICD-10-CM

## 2019-06-05 DIAGNOSIS — D63.8 ANEMIA, CHRONIC DISEASE: ICD-10-CM

## 2019-06-05 DIAGNOSIS — Z79.01 CHRONIC ANTICOAGULATION: ICD-10-CM

## 2019-06-05 DIAGNOSIS — D63.1 ANEMIA, CHRONIC RENAL FAILURE, STAGE 3 (MODERATE): ICD-10-CM

## 2019-06-05 DIAGNOSIS — D64.9 NORMOCYTIC NORMOCHROMIC ANEMIA: ICD-10-CM

## 2019-06-05 DIAGNOSIS — D63.8 ANEMIA OF CHRONIC DISEASE: ICD-10-CM

## 2019-06-05 DIAGNOSIS — F17.200 TOBACCO DEPENDENCE: ICD-10-CM

## 2019-06-05 DIAGNOSIS — D64.9 ANEMIA, UNSPECIFIED TYPE: ICD-10-CM

## 2019-06-05 DIAGNOSIS — I26.99 OTHER ACUTE PULMONARY EMBOLISM WITHOUT ACUTE COR PULMONALE: ICD-10-CM

## 2019-06-05 DIAGNOSIS — I82.4Y2 DEEP VEIN THROMBOSIS (DVT) OF PROXIMAL VEIN OF LEFT LOWER EXTREMITY, UNSPECIFIED CHRONICITY: Primary | ICD-10-CM

## 2019-06-05 DIAGNOSIS — D53.9 MACROCYTIC ANEMIA: ICD-10-CM

## 2019-06-05 DIAGNOSIS — D50.0 IRON DEFICIENCY ANEMIA DUE TO CHRONIC BLOOD LOSS: ICD-10-CM

## 2019-06-05 DIAGNOSIS — R79.83 HOMOCYSTEINEMIA: ICD-10-CM

## 2019-06-05 DIAGNOSIS — Z15.89 HETEROZYGOUS MTHFR MUTATION C677T: ICD-10-CM

## 2019-06-05 DIAGNOSIS — N18.30 ANEMIA, CHRONIC RENAL FAILURE, STAGE 3 (MODERATE): ICD-10-CM

## 2019-06-05 PROCEDURE — 99213 OFFICE O/P EST LOW 20 MIN: CPT | Mod: ,,, | Performed by: INTERNAL MEDICINE

## 2019-06-05 PROCEDURE — 99213 PR OFFICE/OUTPT VISIT, EST, LEVL III, 20-29 MIN: ICD-10-PCS | Mod: ,,, | Performed by: INTERNAL MEDICINE

## 2019-06-05 PROCEDURE — 3288F PR FALLS RISK ASSESSMENT DOCUMENTED: ICD-10-PCS | Mod: ,,, | Performed by: INTERNAL MEDICINE

## 2019-06-05 PROCEDURE — 1100F PR PT FALLS ASSESS DOC 2+ FALLS/FALL W/INJURY/YR: ICD-10-PCS | Mod: ,,, | Performed by: INTERNAL MEDICINE

## 2019-06-05 PROCEDURE — 1100F PTFALLS ASSESS-DOCD GE2>/YR: CPT | Mod: ,,, | Performed by: INTERNAL MEDICINE

## 2019-06-05 PROCEDURE — 3288F FALL RISK ASSESSMENT DOCD: CPT | Mod: ,,, | Performed by: INTERNAL MEDICINE

## 2019-06-06 ENCOUNTER — OFFICE VISIT (OUTPATIENT)
Dept: FAMILY MEDICINE | Facility: CLINIC | Age: 84
End: 2019-06-06
Payer: MEDICARE

## 2019-06-06 ENCOUNTER — HOSPITAL ENCOUNTER (EMERGENCY)
Facility: HOSPITAL | Age: 84
Discharge: HOME | End: 2019-06-06
Attending: EMERGENCY MEDICINE
Payer: MEDICARE

## 2019-06-06 VITALS
TEMPERATURE: 98 F | RESPIRATION RATE: 18 BRPM | OXYGEN SATURATION: 96 % | DIASTOLIC BLOOD PRESSURE: 73 MMHG | HEIGHT: 63 IN | BODY MASS INDEX: 22.07 KG/M2 | SYSTOLIC BLOOD PRESSURE: 171 MMHG | HEART RATE: 68 BPM | WEIGHT: 124.56 LBS

## 2019-06-06 VITALS
HEIGHT: 63 IN | BODY MASS INDEX: 22.07 KG/M2 | OXYGEN SATURATION: 90 % | TEMPERATURE: 98 F | WEIGHT: 124.56 LBS | SYSTOLIC BLOOD PRESSURE: 131 MMHG | HEART RATE: 67 BPM | DIASTOLIC BLOOD PRESSURE: 56 MMHG

## 2019-06-06 DIAGNOSIS — R60.0 EDEMA OF RIGHT FOREARM: ICD-10-CM

## 2019-06-06 DIAGNOSIS — R11.2 NAUSEA AND VOMITING, INTRACTABILITY OF VOMITING NOT SPECIFIED, UNSPECIFIED VOMITING TYPE: Primary | ICD-10-CM

## 2019-06-06 DIAGNOSIS — R10.13 EPIGASTRIC PAIN: ICD-10-CM

## 2019-06-06 DIAGNOSIS — L53.9: ICD-10-CM

## 2019-06-06 DIAGNOSIS — R19.7 DIARRHEA, UNSPECIFIED TYPE: ICD-10-CM

## 2019-06-06 DIAGNOSIS — R53.1 WEAKNESS: ICD-10-CM

## 2019-06-06 DIAGNOSIS — R10.84 GENERALIZED ABDOMINAL PAIN: ICD-10-CM

## 2019-06-06 LAB
ALBUMIN SERPL BCP-MCNC: 3.8 G/DL (ref 3.5–5.2)
ALP SERPL-CCNC: 66 U/L (ref 55–135)
ALT SERPL W/O P-5'-P-CCNC: 24 U/L (ref 10–44)
ANION GAP SERPL CALC-SCNC: 9 MMOL/L (ref 8–16)
AST SERPL-CCNC: 31 U/L (ref 10–40)
BASOPHILS # BLD AUTO: 0 K/UL (ref 0–0.2)
BASOPHILS NFR BLD: 0 % (ref 0–1.9)
BILIRUB SERPL-MCNC: 0.3 MG/DL (ref 0.1–1)
BUN SERPL-MCNC: 39 MG/DL (ref 8–23)
CALCIUM SERPL-MCNC: 9.6 MG/DL (ref 8.7–10.5)
CHLORIDE SERPL-SCNC: 105 MMOL/L (ref 95–110)
CO2 SERPL-SCNC: 27 MMOL/L (ref 23–29)
CREAT SERPL-MCNC: 1.8 MG/DL (ref 0.5–1.4)
DIFFERENTIAL METHOD: ABNORMAL
EOSINOPHIL # BLD AUTO: 0.2 K/UL (ref 0–0.5)
EOSINOPHIL NFR BLD: 3.4 % (ref 0–8)
ERYTHROCYTE [DISTWIDTH] IN BLOOD BY AUTOMATED COUNT: 13.5 % (ref 11.5–14.5)
EST. GFR  (AFRICAN AMERICAN): 29 ML/MIN/1.73 M^2
EST. GFR  (NON AFRICAN AMERICAN): 25 ML/MIN/1.73 M^2
GLUCOSE SERPL-MCNC: 93 MG/DL (ref 70–110)
HCT VFR BLD AUTO: 32.1 % (ref 37–48.5)
HGB BLD-MCNC: 10.2 G/DL (ref 12–16)
LIPASE SERPL-CCNC: 29 U/L (ref 4–60)
LYMPHOCYTES # BLD AUTO: 1 K/UL (ref 1–4.8)
LYMPHOCYTES NFR BLD: 16 % (ref 18–48)
MCH RBC QN AUTO: 30.7 PG (ref 27–31)
MCHC RBC AUTO-ENTMCNC: 31.8 G/DL (ref 32–36)
MCV RBC AUTO: 97 FL (ref 82–98)
MONOCYTES # BLD AUTO: 0.5 K/UL (ref 0.3–1)
MONOCYTES NFR BLD: 8.4 % (ref 4–15)
NEUTROPHILS # BLD AUTO: 4.7 K/UL (ref 1.8–7.7)
NEUTROPHILS NFR BLD: 72.2 % (ref 38–73)
PLATELET # BLD AUTO: 217 K/UL (ref 150–350)
PMV BLD AUTO: 8.6 FL (ref 9.2–12.9)
POTASSIUM SERPL-SCNC: 4.9 MMOL/L (ref 3.5–5.1)
PROT SERPL-MCNC: 7.1 G/DL (ref 6–8.4)
RBC # BLD AUTO: 3.33 M/UL (ref 4–5.4)
SODIUM SERPL-SCNC: 141 MMOL/L (ref 136–145)
WBC # BLD AUTO: 6.4 K/UL (ref 3.9–12.7)

## 2019-06-06 PROCEDURE — 85025 COMPLETE CBC W/AUTO DIFF WBC: CPT

## 2019-06-06 PROCEDURE — 3288F FALL RISK ASSESSMENT DOCD: CPT | Mod: CPTII,S$GLB,, | Performed by: NURSE PRACTITIONER

## 2019-06-06 PROCEDURE — 99285 EMERGENCY DEPT VISIT HI MDM: CPT | Mod: 25

## 2019-06-06 PROCEDURE — 25000003 PHARM REV CODE 250: Performed by: EMERGENCY MEDICINE

## 2019-06-06 PROCEDURE — 99214 OFFICE O/P EST MOD 30 MIN: CPT | Mod: S$GLB,,, | Performed by: NURSE PRACTITIONER

## 2019-06-06 PROCEDURE — 93005 ELECTROCARDIOGRAM TRACING: CPT

## 2019-06-06 PROCEDURE — 80053 COMPREHEN METABOLIC PANEL: CPT

## 2019-06-06 PROCEDURE — 99999 PR PBB SHADOW E&M-EST. PATIENT-LVL III: CPT | Mod: PBBFAC,,, | Performed by: NURSE PRACTITIONER

## 2019-06-06 PROCEDURE — 96360 HYDRATION IV INFUSION INIT: CPT

## 2019-06-06 PROCEDURE — 1100F PTFALLS ASSESS-DOCD GE2>/YR: CPT | Mod: CPTII,S$GLB,, | Performed by: NURSE PRACTITIONER

## 2019-06-06 PROCEDURE — 99999 PR PBB SHADOW E&M-EST. PATIENT-LVL III: ICD-10-PCS | Mod: PBBFAC,,, | Performed by: NURSE PRACTITIONER

## 2019-06-06 PROCEDURE — 1100F PR PT FALLS ASSESS DOC 2+ FALLS/FALL W/INJURY/YR: ICD-10-PCS | Mod: CPTII,S$GLB,, | Performed by: NURSE PRACTITIONER

## 2019-06-06 PROCEDURE — 83690 ASSAY OF LIPASE: CPT

## 2019-06-06 PROCEDURE — 36415 COLL VENOUS BLD VENIPUNCTURE: CPT

## 2019-06-06 PROCEDURE — 3288F PR FALLS RISK ASSESSMENT DOCUMENTED: ICD-10-PCS | Mod: CPTII,S$GLB,, | Performed by: NURSE PRACTITIONER

## 2019-06-06 PROCEDURE — 99214 PR OFFICE/OUTPT VISIT, EST, LEVL IV, 30-39 MIN: ICD-10-PCS | Mod: S$GLB,,, | Performed by: NURSE PRACTITIONER

## 2019-06-06 RX ORDER — SODIUM CHLORIDE 9 MG/ML
500 INJECTION, SOLUTION INTRAVENOUS
Status: COMPLETED | OUTPATIENT
Start: 2019-06-06 | End: 2019-06-06

## 2019-06-06 RX ORDER — AMLODIPINE BESYLATE 5 MG/1
5 TABLET ORAL
COMMUNITY
End: 2019-06-18 | Stop reason: SDUPTHER

## 2019-06-06 RX ADMIN — SODIUM CHLORIDE 500 ML: 0.9 INJECTION, SOLUTION INTRAVENOUS at 03:06

## 2019-06-06 NOTE — DISCHARGE INSTRUCTIONS
"Possible "colitis" (inflammation of the colon) noted on CT today that may be associated with your loose stools that you should also discuss with GI tomorrow.  "

## 2019-06-06 NOTE — PROGRESS NOTES
Subjective:       Patient ID: Blaire Cage is a 88 y.o. female.    Chief Complaint: Nausea (vomiting, itching bruise)    Ms. Cage presents today with complaints of N/V/D with abdominal cramping that started 6/4. Has tried Pepto-bismol and zofran without relief. Abdominal pain is currently 10/10. She is doubled over in pain sitting in a wheelchair. Here today with a family member who tells me she is normally ambulatory with a walker, but has been so weak, they are using a wheelchair for transfers. Unable to hold down food. Can tolerate only small amounts of liquid. Had 1/2 of water bottle today then vomited. Vomiting has occurred 3x today. Weight stable given situation, however it continues to trend down over time.     Also has increased redness/swelling to the inside of her right forearm.     Patient Active Problem List   Diagnosis    Diabetic neuropathy, type II diabetes mellitus    CKD (chronic kidney disease), stage III, eGFR 39, progressive 31    Hypothyroidism    Hypertension associated with diabetes    Hyperlipidemia associated with type 2 diabetes mellitus    Type 2 diabetes mellitus with stage 3 chronic kidney disease    Right carotid bruit    COPD mixed type    Anxiety    Tobacco dependence    Abdominal aortic aneurysm without rupture    Hip arthritis    Degenerative spinal arthritis    Osteoporosis    Left ventricular diastolic dysfunction with preserved systolic function    Hypertensive left ventricular hypertrophy, without heart failure    Smoker, quit 5/2016, 25 pck-years    Abdominal obesity    Absolute anemia    Body mass index (BMI) 23.0-23.9, adult, today 24.1    Iron deficiency anemia due to chronic blood loss    Proteinuria due to type 2 diabetes mellitus    History of syncope in childhood    Prerenal azotemia    Drug-induced constipation    COPD with acute exacerbation    Asthmatic bronchitis with acute exacerbation    Shortness of breath    Controlled type 2  diabetes mellitus, without long-term current use of insulin    Acute pulmonary embolism    SOB (shortness of breath)    Asthma-COPD overlap syndrome    Eosinophilic asthma    Moderate malnutrition    Debility    Type 2 diabetes mellitus with kidney complication, without long-term current use of insulin    Chronic anticoagulation    Constipation    DVT (deep venous thrombosis)    History of pulmonary embolism    Anemia due to multiple mechanisms    Anemia, chronic disease    Normocytic normochromic anemia    Anemia, chronic renal failure, stage 3 (moderate)    Homocysteinemia    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 Negative for fever and unexpected weight change.   Gastrointestinal: Positive for abdominal pain, diarrhea, nausea and vomiting.   Skin: Positive for color change.   Neurological: Positive for weakness.       Objective:      Physical Exam   Constitutional: She is oriented to person, place, and time. She appears ill.   Cardiovascular: Normal rate and regular rhythm.   Pulmonary/Chest: Effort normal and breath sounds normal.   Abdominal: Soft. Bowel sounds are decreased. There is no tenderness.   Musculoskeletal: She exhibits no edema.   Neurological: She is alert and oriented to person, place, and time.   Skin: Skin is warm and dry. There is erythema (right forearm).   Bruising to BUE. Skin tear to left forearm with surrounding bruising.   Inside of right forearm erythematous and edematous. Itching.    Nursing note and vitals reviewed.      Assessment:       1. Nausea and vomiting, intractability of vomiting not specified, unspecified vomiting type    2. Diarrhea, unspecified type    3. Weakness     4. Generalized abdominal pain    5. Erythema of forearm    6. Edema of right forearm        Plan:   Blaire was seen today for nausea.    Diagnoses and all orders for this visit:    Nausea and vomiting, intractability of vomiting not specified, unspecified vomiting type  -     Refer to Emergency Dept.    Diarrhea, unspecified type  -     Refer to Emergency Dept.    Weakness  -     Refer to Emergency Dept.    Generalized abdominal pain  -     Refer to Emergency Dept.    Erythema of forearm  -     Refer to Emergency Dept.    Edema of right forearm  -     Refer to Emergency Dept.      Report called to Lenka Gonzales NP at [facility] ED

## 2019-06-07 ENCOUNTER — OFFICE VISIT (OUTPATIENT)
Dept: GASTROENTEROLOGY | Facility: CLINIC | Age: 84
End: 2019-06-07
Payer: MEDICARE

## 2019-06-07 VITALS
WEIGHT: 124.56 LBS | HEIGHT: 63 IN | SYSTOLIC BLOOD PRESSURE: 126 MMHG | DIASTOLIC BLOOD PRESSURE: 51 MMHG | HEART RATE: 66 BPM | BODY MASS INDEX: 22.07 KG/M2

## 2019-06-07 DIAGNOSIS — R11.2 NAUSEA AND VOMITING, INTRACTABILITY OF VOMITING NOT SPECIFIED, UNSPECIFIED VOMITING TYPE: ICD-10-CM

## 2019-06-07 DIAGNOSIS — K59.00 CONSTIPATION, UNSPECIFIED CONSTIPATION TYPE: ICD-10-CM

## 2019-06-07 DIAGNOSIS — D63.8 ANEMIA OF CHRONIC DISEASE: ICD-10-CM

## 2019-06-07 DIAGNOSIS — J44.9 COPD MIXED TYPE: Primary | ICD-10-CM

## 2019-06-07 DIAGNOSIS — N18.30 CKD (CHRONIC KIDNEY DISEASE), STAGE III: ICD-10-CM

## 2019-06-07 DIAGNOSIS — R19.7 ACUTE DIARRHEA: ICD-10-CM

## 2019-06-07 DIAGNOSIS — K52.9 ACUTE GASTROENTERITIS: ICD-10-CM

## 2019-06-07 DIAGNOSIS — R10.9 ABDOMINAL PAIN, UNSPECIFIED ABDOMINAL LOCATION: ICD-10-CM

## 2019-06-07 PROCEDURE — 1101F PT FALLS ASSESS-DOCD LE1/YR: CPT | Mod: CPTII,S$GLB,, | Performed by: INTERNAL MEDICINE

## 2019-06-07 PROCEDURE — 99499 RISK ADDL DX/OHS AUDIT: ICD-10-PCS | Mod: S$GLB,,, | Performed by: INTERNAL MEDICINE

## 2019-06-07 PROCEDURE — 99214 PR OFFICE/OUTPT VISIT, EST, LEVL IV, 30-39 MIN: ICD-10-PCS | Mod: S$GLB,,, | Performed by: INTERNAL MEDICINE

## 2019-06-07 PROCEDURE — 99999 PR PBB SHADOW E&M-EST. PATIENT-LVL III: CPT | Mod: PBBFAC,,, | Performed by: INTERNAL MEDICINE

## 2019-06-07 PROCEDURE — 1101F PR PT FALLS ASSESS DOC 0-1 FALLS W/OUT INJ PAST YR: ICD-10-PCS | Mod: CPTII,S$GLB,, | Performed by: INTERNAL MEDICINE

## 2019-06-07 PROCEDURE — 99214 OFFICE O/P EST MOD 30 MIN: CPT | Mod: S$GLB,,, | Performed by: INTERNAL MEDICINE

## 2019-06-07 PROCEDURE — 99499 UNLISTED E&M SERVICE: CPT | Mod: S$GLB,,, | Performed by: INTERNAL MEDICINE

## 2019-06-07 PROCEDURE — 99999 PR PBB SHADOW E&M-EST. PATIENT-LVL III: ICD-10-PCS | Mod: PBBFAC,,, | Performed by: INTERNAL MEDICINE

## 2019-06-07 NOTE — ED NOTES
Pt in room 8 for evaluation of abd pain. Pt is awake and alert. Resp even and unlabored. Abd soft and mildly tender to right and epigastric area.  Pt reports pain worsening and diarrhea with eating. Pt denies chest pain or sob.

## 2019-06-07 NOTE — PROGRESS NOTES
"Subjective:       Patient ID: Blaire Cage is a 88 y.o. female.    This is an established patient.      Chief Complaint: Abdominal pain    Abdominal Pain   This is a new problem. The current episode started in the past 7 days. The onset quality is sudden. The problem occurs daily. Duration: variable. The problem has been gradually improving. The pain is located in the generalized abdominal region. The pain is at a severity of 5/10. The pain is moderate. The quality of the pain is aching and cramping. The abdominal pain does not radiate. Associated symptoms include constipation, diarrhea (acute, multiple loose, nonbloody stools per day), nausea and vomiting. Pertinent negatives include no arthralgias, dysuria, fever, hematuria or myalgias. Nothing aggravates the pain. The pain is relieved by nothing. She has tried nothing for the symptoms. The treatment provided no relief. Prior diagnostic workup includes CT scan. Her past medical history is significant for GERD.     Review of Systems   Constitutional: Negative for chills, fatigue and fever.   Respiratory: Negative for cough, shortness of breath and wheezing.    Cardiovascular: Negative for chest pain and palpitations.   Gastrointestinal: Positive for abdominal pain, constipation, diarrhea (acute, multiple loose, nonbloody stools per day), nausea and vomiting. Negative for blood in stool.   Genitourinary: Negative for dysuria and hematuria.   Musculoskeletal: Negative for arthralgias and myalgias.   Skin: Negative for color change and rash.   Psychiatric/Behavioral: The patient is not nervous/anxious.    All other systems reviewed and are negative.      Objective:       Vitals:    06/07/19 1040   BP: (!) 126/51   Pulse: 66   Weight: 56.5 kg (124 lb 9 oz)   Height: 5' 3" (1.6 m)       Physical Exam   Constitutional: She is oriented to person, place, and time. She appears well-developed.   Thin elderly female who appears tired and mildly ill.   HENT:   Head: " Normocephalic and atraumatic.   Eyes: Pupils are equal, round, and reactive to light. No scleral icterus.   Cardiovascular: Normal rate and regular rhythm.   No murmur heard.  Pulmonary/Chest: Effort normal and breath sounds normal. She has no wheezes.   Abdominal: Soft. Bowel sounds are normal. She exhibits no distension. There is tenderness (mild, diffuse).   Neurological: She is alert and oriented to person, place, and time.   Vitals reviewed.        Further history was obtained from the patient's daughter who was present throughout the interview and states that symptoms began abruptly three days ago.  History is otherwise as above in the HPI.       Old records from Dr. Alvarez reviewed and are as summarized above in the HPI.    CT scan was independently visualized and reviewed by me and showed colitis.      Lab Results   Component Value Date    WBC 6.40 06/06/2019    HGB 10.2 (L) 06/06/2019    HCT 32.1 (L) 06/06/2019    MCV 97 06/06/2019     06/06/2019       CMP  Sodium   Date Value Ref Range Status   06/06/2019 141 136 - 145 mmol/L Final     Potassium   Date Value Ref Range Status   06/06/2019 4.9 3.5 - 5.1 mmol/L Final     Chloride   Date Value Ref Range Status   06/06/2019 105 95 - 110 mmol/L Final     CO2   Date Value Ref Range Status   06/06/2019 27 23 - 29 mmol/L Final     Glucose   Date Value Ref Range Status   06/06/2019 93 70 - 110 mg/dL Final     BUN, Bld   Date Value Ref Range Status   06/06/2019 39 (H) 8 - 23 mg/dL Final     Creatinine   Date Value Ref Range Status   06/06/2019 1.8 (H) 0.5 - 1.4 mg/dL Final   03/04/2013 1.1 0.5 - 1.4 mg/dL Final     Calcium   Date Value Ref Range Status   06/06/2019 9.6 8.7 - 10.5 mg/dL Final   03/04/2013 9.8 8.7 - 10.5 mg/dL Final     Total Protein   Date Value Ref Range Status   06/06/2019 7.1 6.0 - 8.4 g/dL Final     Albumin   Date Value Ref Range Status   06/06/2019 3.8 3.5 - 5.2 g/dL Final     Total Bilirubin   Date Value Ref Range Status   06/06/2019  0.3 0.1 - 1.0 mg/dL Final     Comment:     For infants and newborns, interpretation of results should be based  on gestational age, weight and in agreement with clinical  observations.  Premature Infant recommended reference ranges:  Up to 24 hours.............<8.0 mg/dL  Up to 48 hours............<12.0 mg/dL  3-5 days..................<15.0 mg/dL  6-29 days.................<15.0 mg/dL       Alkaline Phosphatase   Date Value Ref Range Status   06/06/2019 66 55 - 135 U/L Final   03/04/2013 60 55 - 135 U/L Final     AST   Date Value Ref Range Status   06/06/2019 31 10 - 40 U/L Final   03/04/2013 21 10 - 40 U/L Final     ALT   Date Value Ref Range Status   06/06/2019 24 10 - 44 U/L Final     Anion Gap   Date Value Ref Range Status   06/06/2019 9 8 - 16 mmol/L Final   03/04/2013 10 5 - 15 meq/L Final     eGFR if    Date Value Ref Range Status   06/06/2019 29 (A) >60 mL/min/1.73 m^2 Final     eGFR if non    Date Value Ref Range Status   06/06/2019 25 (A) >60 mL/min/1.73 m^2 Final     Comment:     Calculation used to obtain the estimated glomerular filtration  rate (eGFR) is the CKD-EPI equation.            Assessment:       1. COPD mixed type    2. CKD (chronic kidney disease), stage III, eGFR 39, progressive 31    3. Anemia of chronic disease    4. Constipation, unspecified constipation type    5. Nausea and vomiting, intractability of vomiting not specified, unspecified vomiting type    6. Abdominal pain, unspecified abdominal location    7. Acute diarrhea    8. Acute gastroenteritis        Plan:       1.  Check stool studies to rule out c.diff  2.  Continue current medications  3.  Hydrate  4.  Follow up in 1-2 weeks  5.  No indication for endoscopy at this time as she likely has acute infectious gastroenteritis/colitis.  Her anemia is chronic and is not consistent with DEREJE.  6.  Further recommendations to follow after above.

## 2019-06-13 ENCOUNTER — OFFICE VISIT (OUTPATIENT)
Dept: PODIATRY | Facility: CLINIC | Age: 84
End: 2019-06-13
Payer: MEDICARE

## 2019-06-13 ENCOUNTER — PATIENT MESSAGE (OUTPATIENT)
Dept: GASTROENTEROLOGY | Facility: CLINIC | Age: 84
End: 2019-06-13

## 2019-06-13 ENCOUNTER — CLINICAL SUPPORT (OUTPATIENT)
Dept: CARDIOLOGY | Facility: CLINIC | Age: 84
End: 2019-06-13
Attending: INTERNAL MEDICINE
Payer: MEDICARE

## 2019-06-13 ENCOUNTER — OFFICE VISIT (OUTPATIENT)
Dept: FAMILY MEDICINE | Facility: CLINIC | Age: 84
End: 2019-06-13
Payer: MEDICARE

## 2019-06-13 VITALS
TEMPERATURE: 98 F | DIASTOLIC BLOOD PRESSURE: 58 MMHG | BODY MASS INDEX: 22.07 KG/M2 | SYSTOLIC BLOOD PRESSURE: 152 MMHG | HEART RATE: 60 BPM | HEIGHT: 63 IN | WEIGHT: 124.56 LBS | OXYGEN SATURATION: 96 %

## 2019-06-13 VITALS
DIASTOLIC BLOOD PRESSURE: 62 MMHG | SYSTOLIC BLOOD PRESSURE: 129 MMHG | BODY MASS INDEX: 22.07 KG/M2 | HEART RATE: 66 BPM | HEIGHT: 63 IN | WEIGHT: 124.56 LBS

## 2019-06-13 DIAGNOSIS — B35.1 ONYCHOMYCOSIS DUE TO DERMATOPHYTE: ICD-10-CM

## 2019-06-13 DIAGNOSIS — R10.84 GENERALIZED ABDOMINAL PAIN: ICD-10-CM

## 2019-06-13 DIAGNOSIS — E11.51 TYPE II DIABETES MELLITUS WITH PERIPHERAL CIRCULATORY DISORDER: ICD-10-CM

## 2019-06-13 DIAGNOSIS — R55 RECURRENT SYNCOPE: ICD-10-CM

## 2019-06-13 DIAGNOSIS — E11.42 DIABETIC POLYNEUROPATHY ASSOCIATED WITH TYPE 2 DIABETES MELLITUS: Primary | ICD-10-CM

## 2019-06-13 DIAGNOSIS — R19.7 DIARRHEA, UNSPECIFIED TYPE: ICD-10-CM

## 2019-06-13 DIAGNOSIS — F32.A DEPRESSION, UNSPECIFIED DEPRESSION TYPE: ICD-10-CM

## 2019-06-13 DIAGNOSIS — Z95.818 STATUS POST PLACEMENT OF IMPLANTABLE LOOP RECORDER: ICD-10-CM

## 2019-06-13 DIAGNOSIS — R11.2 NAUSEA AND VOMITING, INTRACTABILITY OF VOMITING NOT SPECIFIED, UNSPECIFIED VOMITING TYPE: Primary | ICD-10-CM

## 2019-06-13 DIAGNOSIS — N18.4 CKD (CHRONIC KIDNEY DISEASE) STAGE 4, GFR 15-29 ML/MIN: ICD-10-CM

## 2019-06-13 PROCEDURE — 1100F PTFALLS ASSESS-DOCD GE2>/YR: CPT | Mod: CPTII,S$GLB,, | Performed by: NURSE PRACTITIONER

## 2019-06-13 PROCEDURE — 99499 UNLISTED E&M SERVICE: CPT | Mod: S$GLB,,, | Performed by: PODIATRIST

## 2019-06-13 PROCEDURE — 11721 DEBRIDE NAIL 6 OR MORE: CPT | Mod: Q9,S$GLB,, | Performed by: PODIATRIST

## 2019-06-13 PROCEDURE — 1100F PR PT FALLS ASSESS DOC 2+ FALLS/FALL W/INJURY/YR: ICD-10-PCS | Mod: CPTII,S$GLB,, | Performed by: NURSE PRACTITIONER

## 2019-06-13 PROCEDURE — 99214 OFFICE O/P EST MOD 30 MIN: CPT | Mod: S$GLB,,, | Performed by: NURSE PRACTITIONER

## 2019-06-13 PROCEDURE — 99999 PR PBB SHADOW E&M-EST. PATIENT-LVL IV: CPT | Mod: PBBFAC,,, | Performed by: NURSE PRACTITIONER

## 2019-06-13 PROCEDURE — 11721 PR DEBRIDEMENT OF NAILS, 6 OR MORE: ICD-10-PCS | Mod: Q9,S$GLB,, | Performed by: PODIATRIST

## 2019-06-13 PROCEDURE — 3288F FALL RISK ASSESSMENT DOCD: CPT | Mod: CPTII,S$GLB,, | Performed by: NURSE PRACTITIONER

## 2019-06-13 PROCEDURE — 99999 PR PBB SHADOW E&M-EST. PATIENT-LVL III: CPT | Mod: PBBFAC,,, | Performed by: PODIATRIST

## 2019-06-13 PROCEDURE — 99214 PR OFFICE/OUTPT VISIT, EST, LEVL IV, 30-39 MIN: ICD-10-PCS | Mod: S$GLB,,, | Performed by: NURSE PRACTITIONER

## 2019-06-13 PROCEDURE — 99999 PR PBB SHADOW E&M-EST. PATIENT-LVL IV: ICD-10-PCS | Mod: PBBFAC,,, | Performed by: NURSE PRACTITIONER

## 2019-06-13 PROCEDURE — 99999 PR PBB SHADOW E&M-EST. PATIENT-LVL III: ICD-10-PCS | Mod: PBBFAC,,, | Performed by: PODIATRIST

## 2019-06-13 PROCEDURE — 3288F PR FALLS RISK ASSESSMENT DOCUMENTED: ICD-10-PCS | Mod: CPTII,S$GLB,, | Performed by: NURSE PRACTITIONER

## 2019-06-13 PROCEDURE — 99499 NO LOS: ICD-10-PCS | Mod: S$GLB,,, | Performed by: PODIATRIST

## 2019-06-13 NOTE — PROGRESS NOTES
Subjective:      Patient ID: Blaire Cage is a 88 y.o. female.    Chief Complaint: Diabetic Foot Exam (PCP-Eli Edmonds 6/6/19)    Blaire is a 88 y.o. female who presents to the clinic for routine evaluation and treatment of diabetic feet. Blaire has a past medical history of Anemia due to multiple mechanisms (6/29/2018), Anemia, chronic disease (6/29/2018), Anemia, chronic renal failure, stage 3 (moderate) (6/29/2018), Anticoagulant long-term use, Aortic aneurysm, CHF (congestive heart failure), COPD (chronic obstructive pulmonary disease), COPD with acute exacerbation (1/9/2015), Diabetes mellitus type II, DVT (deep venous thrombosis) (06/09/2018), Encounter for blood transfusion, 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 (8/7/2018), Hip arthritis (3/1/2016), Homocysteinemia (8/7/2018), Hyperlipidemia, Hypertension, Normocytic normochromic anemia (6/29/2018), PE (pulmonary thromboembolism) (06/09/2018), Pneumonia of right lower lobe due to infectious organism (9/11/2017), and Thyroid disease. In need of nail trimming.  Relates having occasional discomfort from toenails with wearing closed toe shoes.  Has not attempted to self treat.  No acute changes since last visit.    PCP: Dr. Edmonds   Date Last Seen by PCP: 1/4/19      Hemoglobin A1C   Date Value Ref Range Status   03/22/2019 6.2 (H) 4.0 - 5.6 % Final     Comment:     ADA Screening Guidelines:  5.7-6.4%  Consistent with prediabetes  >or=6.5%  Consistent with diabetes  High levels of fetal hemoglobin interfere with the HbA1C  assay. Heterozygous hemoglobin variants (HbS, HgC, etc)do  not significantly interfere with this assay.   However, presence of multiple variants may affect accuracy.     10/03/2018 6.5 (H) 4.0 - 5.6 % Final     Comment:     ADA Screening Guidelines:  5.7-6.4%  Consistent with prediabetes  >or=6.5%  Consistent with diabetes  High levels of fetal hemoglobin interfere with the HbA1C  assay. Heterozygous hemoglobin variants (HbS, HgC, etc)do  not  significantly interfere with this assay.   However, presence of multiple variants may affect accuracy.     06/27/2018 6.8 (H) 4.0 - 5.6 % Final     Comment:     ADA Screening Guidelines:  5.7-6.4%  Consistent with prediabetes  >or=6.5%  Consistent with diabetes  High levels of fetal hemoglobin interfere with the HbA1C  assay. Heterozygous hemoglobin variants (HbS, HgC, etc)do  not significantly interfere with this assay.   However, presence of multiple variants may affect accuracy.             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 8/7/2018    Hip arthritis 3/1/2016    Homocysteinemia 8/7/2018    Hyperlipidemia     Hypertension     Normocytic normochromic anemia 6/29/2018    PE (pulmonary thromboembolism) 06/09/2018    Pneumonia of right lower lobe due to infectious organism 9/11/2017    Thyroid disease     hypothyroid       Past Surgical History:   Procedure Laterality Date    APPENDECTOMY      CHOLECYSTECTOMY      ESOPHAGOGASTRODUODENOSCOPY (EGD) N/A 10/25/2017    Performed by Fadi Flores [facility] at [facility]    FRACTURE SURGERY      right hip     HERNIA REPAIR      groin    HYSTERECTOMY      Insertion, Implantable Loop Recorder N/A 12/12/2018    Performed by Ashok Herbert MD at [facility] CATH LAB    PINNING, HIP, PERCUTANEOUS Left 3/23/2019    Performed by Jorje Hamlin MD at [facility] OR    VARICOSE VEIN SURGERY         Family History   Problem Relation Age of Onset    Hypertension Mother     Heart disease  Mother     Lung disease Father     Diabetes Sister     Diabetes Brother     Kidney disease Son        Social History     Socioeconomic History    Marital status: Legally      Spouse name: Not on file    Number of children: Not on file    Years of education: Not on file    Highest education level: Not on file   Occupational History    Not on file   Social Needs    Financial resource strain: Not very hard    Food insecurity:     Worry: Never true     Inability: Never true    Transportation needs:     Medical: No     Non-medical: No   Tobacco Use    Smoking status: Former Smoker     Packs/day: 0.35     Years: 44.00     Pack years: 15.40     Types: Cigarettes     Last attempt to quit: 2015     Years since quittin.1    Smokeless tobacco: Never Used    Tobacco comment: 2015   Substance and Sexual Activity    Alcohol use: No     Alcohol/week: 0.0 oz     Frequency: Never     Binge frequency: Never    Drug use: No    Sexual activity: Never   Lifestyle    Physical activity:     Days per week: 0 days     Minutes per session: 0 min    Stress: Only a little   Relationships    Social connections:     Talks on phone: More than three times a week     Gets together: More than three times a week     Attends Anabaptist service: Not on file     Active member of club or organization: No     Attends meetings of clubs or organizations: Never     Relationship status:    Other Topics Concern    Not on file   Social History Narrative    Not on file       Current Outpatient Medications   Medication Sig Dispense Refill    acetaminophen (TYLENOL) 325 MG tablet Take 2 tablets (650 mg total) by mouth every 4 (four) hours as needed. (Patient taking differently: Take 650 mg by mouth every 4 (four) hours as needed for Pain. )  0    albuterol-ipratropium (DUO-NEB) 2.5 mg-0.5 mg/3 mL nebulizer solution USE ONE VIAL IN NEBULIZER EVERY 6 HOURS WHILE AWAKE (Patient taking differently: USE ONE VIAL  IN NEBULIZER EVERY 6 HOURS WHILE AWAKE PRN SOB) 120 vial 5    amLODIPine (NORVASC) 5 MG tablet Take 5 mg by mouth twice a week. Tue and Thur      ascorbic acid (VITAMIN C) 500 MG tablet Take 500 mg by mouth once daily.        aspirin (ECOTRIN) 81 MG EC tablet Take 81 mg by mouth once daily.        calcium carbonate (OS-TASIA) 500 mg calcium (1,250 mg) tablet Take 1 tablet by mouth 2 (two) times daily.        ELIQUIS 5 mg Tab TAKE 1 TABLET BY MOUTH TWICE DAILY 60 tablet 5    ferrous sulfate (FEOSOL) 325 mg (65 mg iron) Tab tablet Take 1 tablet (325 mg total) by mouth 2 (two) times daily with meals. 180 tablet 3    fish oil-omega-3 fatty acids 300-1,000 mg capsule Take 2 g by mouth every evening.       fluticasone (FLONASE) 50 mcg/actuation nasal spray 2 sprays by Each Nare route once daily. 48 g 3    fluticasone-salmeterol 232-14 mcg/actuation AePB Inhale 1 puff into the lungs 2 (two) times daily. 1 each 11    FOLBIC 2.5-25-2 mg Tab TAKE 1 TABLET BY MOUTH ONCE DAILY 30 tablet 6    furosemide (LASIX) 20 MG tablet Take 1 tablet on Tuesdays and Thursdays. 24 tablet 1    gabapentin (NEURONTIN) 300 MG capsule TAKE ONE CAPSULE BY MOUTH IN THE EVENING 90 capsule 3    levothyroxine (SYNTHROID) 88 MCG tablet Take 1 tablet (88 mcg total) by mouth before breakfast. 90 tablet 3    magnesium oxide (MAG-OX) 400 mg tablet TAKE ONE TABLET BY MOUTH ONCE DAILY 90 tablet 3    multivitamin (THERAGRAN) tablet Take 1 tablet by mouth once daily.      nitroGLYCERIN (NITROSTAT) 0.4 MG SL tablet Place 1 tablet (0.4 mg total) under the tongue every 5 (five) minutes as needed for Chest pain. As needed (Patient taking differently: Place 0.4 mg under the tongue every 5 (five) minutes as needed for Chest pain. Seek medical help if pain is not relieved after the third dose.) 30 tablet 3    predniSONE (DELTASONE) 20 MG tablet Take one daily for 3 days then repeat for bronchitis (Patient taking differently: Take 20 mg by mouth daily as  needed (Bronchitis Flare). Take one daily for 3 days then repeat for bronchitis) 15 tablet 0    senna-docusate 8.6-50 mg (PERICOLACE) 8.6-50 mg per tablet Take 1 tablet by mouth 2 (two) times daily.      simvastatin (ZOCOR) 40 MG tablet TAKE ONE TABLET BY MOUTH ONCE DAILY IN THE EVENING 90 tablet 3    SITagliptin (JANUVIA) 25 MG Tab Take 1 tablet (25 mg total) by mouth once daily. 30 tablet 5    vitamin D 1000 units Tab Take 1 tablet (1,000 Units total) by mouth once daily. 90 tablet 3     No current facility-administered medications for this visit.        Review of patient's allergies indicates:   Allergen Reactions    Carvedilol Other (See Comments)     Bradycardia and syncope    Boniva [ibandronate]     Codeine     Hydralazine analogues     Iodinated contrast media - iv dye Hives    Lisinopril     Morphine          Review of Systems   Constitution: Negative for chills and fever.   Cardiovascular: Negative for claudication and leg swelling.   Skin: Positive for color change and nail changes.   Musculoskeletal: Positive for arthritis, joint pain and joint swelling.   Neurological: Positive for paresthesias. Negative for numbness.   Psychiatric/Behavioral: Negative for altered mental status.           Objective:      Physical Exam   Constitutional: She is oriented to person, place, and time. She appears well-developed and well-nourished. No distress.   Cardiovascular:   Pulses:       Dorsalis pedis pulses are 2+ on the right side, and 2+ on the left side.        Posterior tibial pulses are 1+ on the right side, and 1+ on the left side.   CFT <3 seconds bilateral.  Pedal hair growth decreased bilateral.  Varicosities noted bilateral.  Mild nonpitting edema noted bilateral.  Toes are cool to touch bilateral.     Musculoskeletal: She exhibits edema. She exhibits no tenderness.   Pain on palpation plantar medial and central heel left foot. No pain with ROM or MMT. No pain with medial and lateral compression  of heel.    Muscle strength 5/5 in all muscle groups bilateral.  No tenderness nor crepitation with ROM of foot/ankle joints right side.  No tenderness with palpation of right foot and ankle.  Bilateral pes planus.  Bilateral hallux abducto valgus.  Bilateral semi-rigid contracture of toes 2-5.     Neurological: She is alert and oriented to person, place, and time. She has normal strength. A sensory deficit is present.   Protective sensation per [place] monofilament intact bilateral.    Vibratory sensation decreased bilateral.    Light touch intact bilateral.   Skin: Skin is warm, dry and intact. Lesion noted. No abrasion, no bruising, no burn, no ecchymosis, no laceration, no petechiae and no rash noted. She is not diaphoretic. No cyanosis or erythema. No pallor. Nails show no clubbing.   Xerosis of pedal skin bilateral.  Pedal skin appears thin and atrophic bilateral. Toenails x 10 appear thickened by 3 mm's, elongated by 3 mm's, and discolored with subungual debris.  Hemosiderin staining noted to bilateral lower extremity.      Examination of the skin reveals no evidence of significant maceration, rashes, open lesions, suspicious appearing nevi or other concerning lesions.              Assessment:       Encounter Diagnoses   Name Primary?    Diabetic polyneuropathy associated with type 2 diabetes mellitus Yes    Type II diabetes mellitus with peripheral circulatory disorder     Onychomycosis due to dermatophyte          Plan:       Blaire was seen today for diabetic foot exam.    Diagnoses and all orders for this visit:    Diabetic polyneuropathy associated with type 2 diabetes mellitus    Type II diabetes mellitus with peripheral circulatory disorder    Onychomycosis due to dermatophyte      I counseled the patient on her conditions, their implications and medical management.    Advised to continue wearing diabetic shoes as they accommodate for digital deformities.      Shoe inspection. Diabetic Foot  Education. Patient reminded of the importance of good nutrition and blood sugar control to help prevent podiatric complications of diabetes. Patient instructed on proper foot hygeine. We discussed wearing proper shoe gear, daily foot inspections, never walking without protective shoe gear, never putting sharp instruments to feet    With patient's permission, nails were aggressively reduced and debrided x 10 to their soft tissue attachment mechanically removing all offending nail and debris.  Patient relates relief following the procedure. She will continue to monitor the areas daily, inspect her feet, wear protective shoe gear when ambulatory, moisturizer to maintain skin integrity.    Return 3 months routine care    Emerson Chan DPM

## 2019-06-13 NOTE — PROGRESS NOTES
Subjective:       Patient ID: Blaire Cage is a 88 y.o. female.    Chief Complaint: Follow-up    Ms. Cage presents today for a one month F/U depression. Was seen last week and sent to the ED for abdominal pain and N/V/D. She was diagnosed with colitis and seen by GI the following day. Stool cultures ordered but she did not return because she is no longer having diarrhea. All other symptoms have also resolved. Noticed that her symptoms improved when she avoids certain foods. She is feeling much better and appetite has improved. Weight still below baseline. Going to eat Pope[employer] after today's appointment. Is happy with the results of Zoloft and would like to stay at current dose. Ambulating with walker independently. Has an appointment with Dr. Jo at the end of the month for CKD.    Patient Active Problem List   Diagnosis    Diabetic neuropathy, type II diabetes mellitus    CKD (chronic kidney disease), stage III, eGFR 39, progressive 31    Hypothyroidism    Hypertension associated with diabetes    Hyperlipidemia associated with type 2 diabetes mellitus    Type 2 diabetes mellitus with stage 3 chronic kidney disease    Right carotid bruit    COPD mixed type    Anxiety    Tobacco dependence    Abdominal aortic aneurysm without rupture    Hip arthritis    Degenerative spinal arthritis    Osteoporosis    Left ventricular diastolic dysfunction with preserved systolic function    Hypertensive left ventricular hypertrophy, without heart failure    Smoker, quit 5/2016, 25 pck-years    Abdominal obesity    Absolute anemia    Body mass index (BMI) 23.0-23.9, adult, today 24.1    Proteinuria due to type 2 diabetes mellitus    History of syncope in childhood    Prerenal azotemia    Drug-induced constipation    COPD with acute exacerbation    Asthmatic bronchitis with acute exacerbation    Shortness of breath    Controlled type 2 diabetes mellitus, without long-term current use of insulin     Acute pulmonary embolism    SOB (shortness of breath)    Asthma-COPD overlap syndrome    Eosinophilic asthma    Moderate malnutrition    Debility    Type 2 diabetes mellitus with kidney complication, without long-term current use of insulin    Chronic anticoagulation    Constipation    DVT (deep venous thrombosis)    History of pulmonary embolism    Anemia due to multiple mechanisms    Anemia, chronic disease    Normocytic normochromic anemia    Anemia, chronic renal failure, stage 3 (moderate)    Homocysteinemia    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   HENT: Negative for hearing loss, rhinorrhea and trouble swallowing.    Eyes: Negative for discharge and visual disturbance.   Respiratory: Negative for chest tightness and wheezing.    Cardiovascular: Negative for chest pain and palpitations.   Gastrointestinal: Negative for blood in stool, constipation, diarrhea and vomiting.   Endocrine: Negative for polydipsia and polyuria.   Genitourinary: Negative for difficulty urinating, dysuria, hematuria and menstrual problem.   Musculoskeletal: Negative for arthralgias, joint swelling and neck pain.   Neurological: Negative for weakness and headaches.   Psychiatric/Behavioral: Negative for confusion and dysphoric mood.       Objective:      Physical Exam   Constitutional: She is oriented to person, place, and time. She does not have a sickly appearance. She does not appear ill. No distress.   Ambulating with walker    Cardiovascular: Normal rate, regular rhythm and normal heart sounds.   Pulmonary/Chest: Effort normal and breath sounds normal.   Abdominal: Soft. Bowel sounds are normal. There is no  tenderness.   Musculoskeletal: She exhibits no edema.   Neurological: She is alert and oriented to person, place, and time.   Skin: Skin is warm and dry.   Psychiatric: She has a normal mood and affect.   Nursing note and vitals reviewed.      Assessment:       1. Nausea and vomiting, intractability of vomiting not specified, unspecified vomiting type    2. Diarrhea, unspecified type    3. Generalized abdominal pain    4. CKD (chronic kidney disease) stage 4, GFR 15-29 ml/min    5. Depression, unspecified depression type        Plan:       Blaire was seen today for follow-up.    Diagnoses and all orders for this visit:    Nausea and vomiting, intractability of vomiting not specified, unspecified vomiting type  Resolved    Diarrhea, unspecified type  Resolved    Generalized abdominal pain  Resolved    CKD (chronic kidney disease) stage 4, GFR 15-29 ml/min  Stable and chronic.  Will continue to monitor q3-6 months and control chronic conditions as optimally as possible to preserve function.  Recent labs reviewed, seems to have returned to baseline. Continue nephrology monitoring    Depression, unspecified depression type  Improved, continue current medication    F/U 3 months and 6 months as scheduled with Dr. Edmonds

## 2019-06-13 NOTE — ASSESSMENT & PLAN NOTE
Chronic. Resume home statin.  Hold oral hyperglycemic agents.   accuchecks Ac and hs. Insulin correction scale.      I'm having difficulty breathing and have blood in my  stool.

## 2019-06-17 ENCOUNTER — TELEPHONE (OUTPATIENT)
Dept: FAMILY MEDICINE | Facility: CLINIC | Age: 84
End: 2019-06-17

## 2019-06-18 RX ORDER — AMLODIPINE BESYLATE 5 MG/1
5 TABLET ORAL DAILY
Qty: 90 TABLET | Refills: 3 | Status: SHIPPED | OUTPATIENT
Start: 2019-06-18 | End: 2019-11-29 | Stop reason: SDUPTHER

## 2019-06-18 NOTE — TELEPHONE ENCOUNTER
Patient has been taking amlodipine twice weekly instead of daily. Pended new RX to you. Nurse bp recheck scheduled for 7/1/19.

## 2019-06-19 ENCOUNTER — LAB VISIT (OUTPATIENT)
Dept: LAB | Facility: HOSPITAL | Age: 84
End: 2019-06-19
Attending: INTERNAL MEDICINE
Payer: MEDICARE

## 2019-06-19 DIAGNOSIS — E86.9 VOLUME DEPLETION, UNSPECIFIED: ICD-10-CM

## 2019-06-19 DIAGNOSIS — R80.9 PROTEINURIA: ICD-10-CM

## 2019-06-19 DIAGNOSIS — M81.0 SENILE OSTEOPOROSIS: ICD-10-CM

## 2019-06-19 DIAGNOSIS — N25.81 SECONDARY HYPERPARATHYROIDISM OF RENAL ORIGIN: ICD-10-CM

## 2019-06-19 DIAGNOSIS — I10 ESSENTIAL HYPERTENSION, MALIGNANT: ICD-10-CM

## 2019-06-19 DIAGNOSIS — N20.0 URIC ACID NEPHROLITHIASIS: ICD-10-CM

## 2019-06-19 DIAGNOSIS — N39.0 URINARY TRACT INFECTION, SITE NOT SPECIFIED: Primary | ICD-10-CM

## 2019-06-19 DIAGNOSIS — E87.5 HYPERPOTASSEMIA: ICD-10-CM

## 2019-06-19 DIAGNOSIS — I26.99 IATROGENIC PULMONARY EMBOLISM AND INFARCTION: ICD-10-CM

## 2019-06-19 DIAGNOSIS — N18.9 ANEMIA OF CHRONIC RENAL FAILURE: ICD-10-CM

## 2019-06-19 DIAGNOSIS — R60.9 EDEMA: ICD-10-CM

## 2019-06-19 DIAGNOSIS — N18.4 CHRONIC KIDNEY DISEASE, STAGE IV (SEVERE): ICD-10-CM

## 2019-06-19 DIAGNOSIS — D63.1 ANEMIA OF CHRONIC RENAL FAILURE: ICD-10-CM

## 2019-06-19 DIAGNOSIS — D64.9 ANEMIA, UNSPECIFIED: ICD-10-CM

## 2019-06-19 DIAGNOSIS — T81.718A IATROGENIC PULMONARY EMBOLISM AND INFARCTION: ICD-10-CM

## 2019-06-19 DIAGNOSIS — E11.9 DIABETES MELLITUS WITHOUT COMPLICATION: ICD-10-CM

## 2019-06-19 LAB
ALBUMIN SERPL BCP-MCNC: 3.8 G/DL (ref 3.5–5.2)
ANION GAP SERPL CALC-SCNC: 11 MMOL/L (ref 8–16)
BUN SERPL-MCNC: 54 MG/DL (ref 8–23)
CALCIUM SERPL-MCNC: 9.9 MG/DL (ref 8.7–10.5)
CHLORIDE SERPL-SCNC: 104 MMOL/L (ref 95–110)
CO2 SERPL-SCNC: 24 MMOL/L (ref 23–29)
CREAT SERPL-MCNC: 2.2 MG/DL (ref 0.5–1.4)
EST. GFR  (AFRICAN AMERICAN): 22.4 ML/MIN/1.73 M^2
EST. GFR  (NON AFRICAN AMERICAN): 19.4 ML/MIN/1.73 M^2
GLUCOSE SERPL-MCNC: 129 MG/DL (ref 70–110)
PHOSPHATE SERPL-MCNC: 3.8 MG/DL (ref 2.7–4.5)
PHOSPHATE SERPL-MCNC: 3.8 MG/DL (ref 2.7–4.5)
POTASSIUM SERPL-SCNC: 5.5 MMOL/L (ref 3.5–5.1)
PTH-INTACT SERPL-MCNC: 58 PG/ML (ref 9–77)
SODIUM SERPL-SCNC: 139 MMOL/L (ref 136–145)

## 2019-06-19 PROCEDURE — 85025 COMPLETE CBC W/AUTO DIFF WBC: CPT

## 2019-06-19 PROCEDURE — 80069 RENAL FUNCTION PANEL: CPT

## 2019-06-19 PROCEDURE — 36415 COLL VENOUS BLD VENIPUNCTURE: CPT | Mod: PO

## 2019-06-19 PROCEDURE — 83970 ASSAY OF PARATHORMONE: CPT

## 2019-06-20 ENCOUNTER — OFFICE VISIT (OUTPATIENT)
Dept: GASTROENTEROLOGY | Facility: CLINIC | Age: 84
End: 2019-06-20
Payer: MEDICARE

## 2019-06-20 VITALS
DIASTOLIC BLOOD PRESSURE: 55 MMHG | BODY MASS INDEX: 22.07 KG/M2 | WEIGHT: 124.56 LBS | SYSTOLIC BLOOD PRESSURE: 137 MMHG | HEIGHT: 63 IN | HEART RATE: 61 BPM

## 2019-06-20 DIAGNOSIS — K59.00 CONSTIPATION, UNSPECIFIED CONSTIPATION TYPE: ICD-10-CM

## 2019-06-20 DIAGNOSIS — J44.89 ASTHMA-COPD OVERLAP SYNDROME: Primary | ICD-10-CM

## 2019-06-20 DIAGNOSIS — K52.9 ACUTE GASTROENTERITIS: ICD-10-CM

## 2019-06-20 DIAGNOSIS — N18.30 CKD (CHRONIC KIDNEY DISEASE), STAGE III: ICD-10-CM

## 2019-06-20 LAB
BASOPHILS # BLD AUTO: 0.03 K/UL (ref 0–0.2)
BASOPHILS NFR BLD: 0.5 % (ref 0–1.9)
DIFFERENTIAL METHOD: ABNORMAL
EOSINOPHIL # BLD AUTO: 0 K/UL (ref 0–0.5)
EOSINOPHIL NFR BLD: 0.2 % (ref 0–8)
ERYTHROCYTE [DISTWIDTH] IN BLOOD BY AUTOMATED COUNT: 13.3 % (ref 11.5–14.5)
HCT VFR BLD AUTO: 30.6 % (ref 37–48.5)
HGB BLD-MCNC: 9.7 G/DL (ref 12–16)
IMM GRANULOCYTES # BLD AUTO: 0.03 K/UL (ref 0–0.04)
IMM GRANULOCYTES NFR BLD AUTO: 0.5 % (ref 0–0.5)
LYMPHOCYTES # BLD AUTO: 0.9 K/UL (ref 1–4.8)
LYMPHOCYTES NFR BLD: 13.8 % (ref 18–48)
MCH RBC QN AUTO: 31.6 PG (ref 27–31)
MCHC RBC AUTO-ENTMCNC: 31.7 G/DL (ref 32–36)
MCV RBC AUTO: 100 FL (ref 82–98)
MONOCYTES # BLD AUTO: 0.2 K/UL (ref 0.3–1)
MONOCYTES NFR BLD: 2.6 % (ref 4–15)
NEUTROPHILS # BLD AUTO: 5.5 K/UL (ref 1.8–7.7)
NEUTROPHILS NFR BLD: 82.4 % (ref 38–73)
NRBC BLD-RTO: 0 /100 WBC
PLATELET # BLD AUTO: 201 K/UL (ref 150–350)
PMV BLD AUTO: 12.4 FL (ref 9.2–12.9)
RBC # BLD AUTO: 3.07 M/UL (ref 4–5.4)
WBC # BLD AUTO: 6.65 K/UL (ref 3.9–12.7)

## 2019-06-20 PROCEDURE — 99214 PR OFFICE/OUTPT VISIT, EST, LEVL IV, 30-39 MIN: ICD-10-PCS | Mod: S$GLB,,, | Performed by: INTERNAL MEDICINE

## 2019-06-20 PROCEDURE — 1100F PTFALLS ASSESS-DOCD GE2>/YR: CPT | Mod: CPTII,S$GLB,, | Performed by: INTERNAL MEDICINE

## 2019-06-20 PROCEDURE — 1100F PR PT FALLS ASSESS DOC 2+ FALLS/FALL W/INJURY/YR: ICD-10-PCS | Mod: CPTII,S$GLB,, | Performed by: INTERNAL MEDICINE

## 2019-06-20 PROCEDURE — 3288F PR FALLS RISK ASSESSMENT DOCUMENTED: ICD-10-PCS | Mod: CPTII,S$GLB,, | Performed by: INTERNAL MEDICINE

## 2019-06-20 PROCEDURE — 99999 PR PBB SHADOW E&M-EST. PATIENT-LVL III: ICD-10-PCS | Mod: PBBFAC,,, | Performed by: INTERNAL MEDICINE

## 2019-06-20 PROCEDURE — 3288F FALL RISK ASSESSMENT DOCD: CPT | Mod: CPTII,S$GLB,, | Performed by: INTERNAL MEDICINE

## 2019-06-20 PROCEDURE — 99214 OFFICE O/P EST MOD 30 MIN: CPT | Mod: S$GLB,,, | Performed by: INTERNAL MEDICINE

## 2019-06-20 PROCEDURE — 99999 PR PBB SHADOW E&M-EST. PATIENT-LVL III: CPT | Mod: PBBFAC,,, | Performed by: INTERNAL MEDICINE

## 2019-06-20 NOTE — PROGRESS NOTES
"Subjective:       Patient ID: Balire Cage is a 88 y.o. female.    This is an established patient.      Chief Complaint: Abdominal pain    PAST HISTORY:    Abdominal Pain   This is a new problem. The current episode started in the past 7 days. The onset quality is sudden. The problem occurs daily. Duration: variable. The problem has been gradually improving. The pain is located in the generalized abdominal region. The pain is at a severity of 5/10. The pain is moderate. The quality of the pain is aching and cramping. The abdominal pain does not radiate. Associated symptoms include constipation. Pertinent negatives include no arthralgias, diarrhea, dysuria, fever, hematuria, myalgias, nausea or vomiting. Nothing aggravates the pain. The pain is relieved by nothing. She has tried nothing for the symptoms. The treatment provided no relief. Prior diagnostic workup includes CT scan. Her past medical history is significant for GERD.     INTERVAL HISTORY:  Since last visit, abdominal pain and diarrhea have completely resolved. She has no symptoms and is feeling well.  She did not submit stool sample as diarrhea ceased.  No other acute issues.    Review of Systems   Constitutional: Negative for chills, fatigue and fever.   Respiratory: Negative for cough, shortness of breath and wheezing.    Cardiovascular: Negative for chest pain and palpitations.   Gastrointestinal: Positive for constipation. Negative for abdominal pain, blood in stool, diarrhea, nausea and vomiting.   Genitourinary: Negative for dysuria and hematuria.   Musculoskeletal: Negative for arthralgias and myalgias.   Skin: Negative for color change and rash.   Psychiatric/Behavioral: The patient is not nervous/anxious.    All other systems reviewed and are negative.      Objective:       Vitals:    06/20/19 1422   BP: (!) 137/55   Pulse: 61   Weight: 56.5 kg (124 lb 9 oz)   Height: 5' 3" (1.6 m)       Physical Exam   Constitutional: She is oriented to " person, place, and time. She appears well-developed.   Thin elderly female who appears tired and mildly ill.   HENT:   Head: Normocephalic and atraumatic.   Eyes: Pupils are equal, round, and reactive to light. No scleral icterus.   Cardiovascular: Normal rate and regular rhythm.   No murmur heard.  Pulmonary/Chest: Effort normal and breath sounds normal. She has no wheezes.   Abdominal: Soft. Bowel sounds are normal. She exhibits no distension. There is no tenderness.   Neurological: She is alert and oriented to person, place, and time.   Vitals reviewed.        Further history was obtained from the patient's daughter who was present throughout the interview and states that symptoms have completely resolved and she is eating well.  History is otherwise as above in the HPI.             Lab Results   Component Value Date    WBC 6.65 06/19/2019    HGB 9.7 (L) 06/19/2019    HCT 30.6 (L) 06/19/2019     (H) 06/19/2019     06/19/2019       CMP  Sodium   Date Value Ref Range Status   06/19/2019 139 136 - 145 mmol/L Final     Potassium   Date Value Ref Range Status   06/19/2019 5.5 (H) 3.5 - 5.1 mmol/L Final     Comment:     *No Visible Hemolysis     Chloride   Date Value Ref Range Status   06/19/2019 104 95 - 110 mmol/L Final     CO2   Date Value Ref Range Status   06/19/2019 24 23 - 29 mmol/L Final     Glucose   Date Value Ref Range Status   06/19/2019 129 (H) 70 - 110 mg/dL Final     BUN, Bld   Date Value Ref Range Status   06/19/2019 54 (H) 8 - 23 mg/dL Final     Creatinine   Date Value Ref Range Status   06/19/2019 2.2 (H) 0.5 - 1.4 mg/dL Final   03/04/2013 1.1 0.5 - 1.4 mg/dL Final     Calcium   Date Value Ref Range Status   06/19/2019 9.9 8.7 - 10.5 mg/dL Final   03/04/2013 9.8 8.7 - 10.5 mg/dL Final     Total Protein   Date Value Ref Range Status   06/06/2019 7.1 6.0 - 8.4 g/dL Final     Albumin   Date Value Ref Range Status   06/19/2019 3.8 3.5 - 5.2 g/dL Final     Total Bilirubin   Date Value Ref  Range Status   06/06/2019 0.3 0.1 - 1.0 mg/dL Final     Comment:     For infants and newborns, interpretation of results should be based  on gestational age, weight and in agreement with clinical  observations.  Premature Infant recommended reference ranges:  Up to 24 hours.............<8.0 mg/dL  Up to 48 hours............<12.0 mg/dL  3-5 days..................<15.0 mg/dL  6-29 days.................<15.0 mg/dL       Alkaline Phosphatase   Date Value Ref Range Status   06/06/2019 66 55 - 135 U/L Final   03/04/2013 60 55 - 135 U/L Final     AST   Date Value Ref Range Status   06/06/2019 31 10 - 40 U/L Final   03/04/2013 21 10 - 40 U/L Final     ALT   Date Value Ref Range Status   06/06/2019 24 10 - 44 U/L Final     Anion Gap   Date Value Ref Range Status   06/19/2019 11 8 - 16 mmol/L Final   03/04/2013 10 5 - 15 meq/L Final     eGFR if    Date Value Ref Range Status   06/19/2019 22.4 (A) >60 mL/min/1.73 m^2 Final     eGFR if non    Date Value Ref Range Status   06/19/2019 19.4 (A) >60 mL/min/1.73 m^2 Final     Comment:     Calculation used to obtain the estimated glomerular filtration  rate (eGFR) is the CKD-EPI equation.            Assessment:       1. Asthma-COPD overlap syndrome    2. CKD (chronic kidney disease), stage III, eGFR 39, progressive 31    3. Constipation, unspecified constipation type    4. Acute gastroenteritis        Plan:       1.  Continue current medications  2.  Hydrate  3.  Follow up PRN  4.  No indication for endoscopy at this time.  Her anemia is chronic and is not consistent with DEREJE.  .

## 2019-06-26 ENCOUNTER — LAB VISIT (OUTPATIENT)
Dept: LAB | Facility: HOSPITAL | Age: 84
End: 2019-06-26
Attending: INTERNAL MEDICINE
Payer: MEDICARE

## 2019-06-26 DIAGNOSIS — R60.9 EDEMA: ICD-10-CM

## 2019-06-26 DIAGNOSIS — N18.9 ANEMIA OF CHRONIC RENAL FAILURE: ICD-10-CM

## 2019-06-26 DIAGNOSIS — N17.9 ACUTE KIDNEY FAILURE, UNSPECIFIED: Primary | ICD-10-CM

## 2019-06-26 DIAGNOSIS — I26.99 IATROGENIC PULMONARY EMBOLISM AND INFARCTION: ICD-10-CM

## 2019-06-26 DIAGNOSIS — N20.0 URIC ACID NEPHROLITHIASIS: ICD-10-CM

## 2019-06-26 DIAGNOSIS — D63.1 ANEMIA OF CHRONIC RENAL FAILURE: ICD-10-CM

## 2019-06-26 DIAGNOSIS — N18.4 CHRONIC KIDNEY DISEASE, STAGE IV (SEVERE): ICD-10-CM

## 2019-06-26 DIAGNOSIS — N25.81 SECONDARY HYPERPARATHYROIDISM OF RENAL ORIGIN: ICD-10-CM

## 2019-06-26 DIAGNOSIS — E86.9 VOLUME DEPLETION, UNSPECIFIED: ICD-10-CM

## 2019-06-26 DIAGNOSIS — M25.552 LEFT HIP PAIN: Primary | ICD-10-CM

## 2019-06-26 DIAGNOSIS — T81.718A IATROGENIC PULMONARY EMBOLISM AND INFARCTION: ICD-10-CM

## 2019-06-26 DIAGNOSIS — E11.9 DIABETES MELLITUS WITHOUT COMPLICATION: ICD-10-CM

## 2019-06-26 DIAGNOSIS — M81.0 SENILE OSTEOPOROSIS: ICD-10-CM

## 2019-06-26 DIAGNOSIS — N39.0 URINARY TRACT INFECTION, SITE NOT SPECIFIED: ICD-10-CM

## 2019-06-26 PROCEDURE — 36415 COLL VENOUS BLD VENIPUNCTURE: CPT | Mod: PO

## 2019-06-26 PROCEDURE — 80069 RENAL FUNCTION PANEL: CPT

## 2019-06-27 LAB
ALBUMIN SERPL BCP-MCNC: 3.3 G/DL (ref 3.5–5.2)
ANION GAP SERPL CALC-SCNC: 6 MMOL/L (ref 8–16)
BUN SERPL-MCNC: 34 MG/DL (ref 8–23)
CALCIUM SERPL-MCNC: 9.6 MG/DL (ref 8.7–10.5)
CHLORIDE SERPL-SCNC: 102 MMOL/L (ref 95–110)
CO2 SERPL-SCNC: 26 MMOL/L (ref 23–29)
CREAT SERPL-MCNC: 1.3 MG/DL (ref 0.5–1.4)
EST. GFR  (AFRICAN AMERICAN): 42.3 ML/MIN/1.73 M^2
EST. GFR  (NON AFRICAN AMERICAN): 36.7 ML/MIN/1.73 M^2
GLUCOSE SERPL-MCNC: 170 MG/DL (ref 70–110)
PHOSPHATE SERPL-MCNC: 2.7 MG/DL (ref 2.7–4.5)
POTASSIUM SERPL-SCNC: 4.9 MMOL/L (ref 3.5–5.1)
SODIUM SERPL-SCNC: 134 MMOL/L (ref 136–145)

## 2019-06-28 ENCOUNTER — TELEPHONE (OUTPATIENT)
Dept: FAMILY MEDICINE | Facility: CLINIC | Age: 84
End: 2019-06-28

## 2019-06-28 NOTE — TELEPHONE ENCOUNTER
I spoke to the patient and she is bringing a form for Dr. Edmonds to sign so the patient can get prescription assistance.

## 2019-06-28 NOTE — TELEPHONE ENCOUNTER
----- Message from Freddie Donald sent at 6/28/2019 11:46 AM CDT -----  Contact: pt's daughter-in-law Yaya   Marisela: Needs Medical Advice    Who Called:  Yaya Lamb Call Back Number: [phone]  Additional Information: Would like a return call regarding application for a medication.

## 2019-06-30 RX ORDER — SITAGLIPTIN 25 MG/1
TABLET, FILM COATED ORAL
Qty: 30 TABLET | Refills: 5 | Status: SHIPPED | OUTPATIENT
Start: 2019-06-30 | End: 2019-08-06

## 2019-07-01 ENCOUNTER — HOSPITAL ENCOUNTER (OUTPATIENT)
Dept: RADIOLOGY | Facility: HOSPITAL | Age: 84
Discharge: HOME | End: 2019-07-01
Attending: ORTHOPAEDIC SURGERY
Payer: MEDICARE

## 2019-07-01 ENCOUNTER — OFFICE VISIT (OUTPATIENT)
Dept: ORTHOPEDICS | Facility: CLINIC | Age: 84
End: 2019-07-01
Payer: MEDICARE

## 2019-07-01 ENCOUNTER — TELEPHONE (OUTPATIENT)
Dept: FAMILY MEDICINE | Facility: CLINIC | Age: 84
End: 2019-07-01

## 2019-07-01 VITALS
BODY MASS INDEX: 21.97 KG/M2 | SYSTOLIC BLOOD PRESSURE: 136 MMHG | HEART RATE: 70 BPM | DIASTOLIC BLOOD PRESSURE: 58 MMHG | HEIGHT: 63 IN | WEIGHT: 124 LBS

## 2019-07-01 DIAGNOSIS — M25.552 LEFT HIP PAIN: ICD-10-CM

## 2019-07-01 DIAGNOSIS — S72.002D CLOSED FRACTURE OF LEFT HIP WITH ROUTINE HEALING, SUBSEQUENT ENCOUNTER: Primary | ICD-10-CM

## 2019-07-01 PROCEDURE — 1100F PTFALLS ASSESS-DOCD GE2>/YR: CPT | Mod: CPTII,S$GLB,, | Performed by: ORTHOPAEDIC SURGERY

## 2019-07-01 PROCEDURE — 1100F PR PT FALLS ASSESS DOC 2+ FALLS/FALL W/INJURY/YR: ICD-10-PCS | Mod: CPTII,S$GLB,, | Performed by: ORTHOPAEDIC SURGERY

## 2019-07-01 PROCEDURE — 3288F PR FALLS RISK ASSESSMENT DOCUMENTED: ICD-10-PCS | Mod: CPTII,S$GLB,, | Performed by: ORTHOPAEDIC SURGERY

## 2019-07-01 PROCEDURE — 73502 X-RAY EXAM HIP UNI 2-3 VIEWS: CPT | Mod: TC,PN,LT

## 2019-07-01 PROCEDURE — 73502 XR HIP 2 VIEW LEFT: ICD-10-PCS | Mod: 26,LT,, | Performed by: RADIOLOGY

## 2019-07-01 PROCEDURE — 99999 PR PBB SHADOW E&M-EST. PATIENT-LVL III: ICD-10-PCS | Mod: PBBFAC,,, | Performed by: ORTHOPAEDIC SURGERY

## 2019-07-01 PROCEDURE — 99213 PR OFFICE/OUTPT VISIT, EST, LEVL III, 20-29 MIN: ICD-10-PCS | Mod: S$GLB,,, | Performed by: ORTHOPAEDIC SURGERY

## 2019-07-01 PROCEDURE — 99999 PR PBB SHADOW E&M-EST. PATIENT-LVL III: CPT | Mod: PBBFAC,,, | Performed by: ORTHOPAEDIC SURGERY

## 2019-07-01 PROCEDURE — 99213 OFFICE O/P EST LOW 20 MIN: CPT | Mod: S$GLB,,, | Performed by: ORTHOPAEDIC SURGERY

## 2019-07-01 PROCEDURE — 3288F FALL RISK ASSESSMENT DOCD: CPT | Mod: CPTII,S$GLB,, | Performed by: ORTHOPAEDIC SURGERY

## 2019-07-01 PROCEDURE — 73502 X-RAY EXAM HIP UNI 2-3 VIEWS: CPT | Mod: 26,LT,, | Performed by: RADIOLOGY

## 2019-07-01 NOTE — TELEPHONE ENCOUNTER
"----- Message from Priti Finley sent at 7/1/2019  8:12 AM CDT -----  Good Morning,     Ms. Cage's daughter dropped off paperwork to be filled out for "[employer] Application Form"     I will have the document here at [facility] until 4:45 today where it will be dropped off in the provider mailbox at [facility].   "

## 2019-07-02 ENCOUNTER — TELEPHONE (OUTPATIENT)
Dept: ORTHOPEDICS | Facility: CLINIC | Age: 84
End: 2019-07-02

## 2019-07-02 ENCOUNTER — TELEPHONE (OUTPATIENT)
Dept: CARDIOLOGY | Facility: CLINIC | Age: 84
End: 2019-07-02

## 2019-07-02 ENCOUNTER — HOSPITAL ENCOUNTER (EMERGENCY)
Facility: HOSPITAL | Age: 84
Discharge: HOME | End: 2019-07-02
Attending: EMERGENCY MEDICINE
Payer: MEDICARE

## 2019-07-02 VITALS
RESPIRATION RATE: 18 BRPM | DIASTOLIC BLOOD PRESSURE: 86 MMHG | OXYGEN SATURATION: 100 % | HEART RATE: 56 BPM | WEIGHT: 124 LBS | SYSTOLIC BLOOD PRESSURE: 163 MMHG | BODY MASS INDEX: 21.97 KG/M2 | TEMPERATURE: 97 F

## 2019-07-02 DIAGNOSIS — R07.9 CHEST PAIN: ICD-10-CM

## 2019-07-02 DIAGNOSIS — M25.552 LEFT HIP PAIN: ICD-10-CM

## 2019-07-02 DIAGNOSIS — R11.2 NON-INTRACTABLE VOMITING WITH NAUSEA, UNSPECIFIED VOMITING TYPE: Primary | ICD-10-CM

## 2019-07-02 DIAGNOSIS — S70.02XA CONTUSION OF LEFT HIP, INITIAL ENCOUNTER: ICD-10-CM

## 2019-07-02 DIAGNOSIS — R07.89 CHEST WALL PAIN: ICD-10-CM

## 2019-07-02 LAB
ALBUMIN SERPL BCP-MCNC: 3.6 G/DL (ref 3.5–5.2)
ALP SERPL-CCNC: 50 U/L (ref 55–135)
ALT SERPL W/O P-5'-P-CCNC: 21 U/L (ref 10–44)
ANION GAP SERPL CALC-SCNC: 9 MMOL/L (ref 8–16)
AST SERPL-CCNC: 30 U/L (ref 10–40)
BASOPHILS # BLD AUTO: 0.04 K/UL (ref 0–0.2)
BASOPHILS NFR BLD: 0.5 % (ref 0–1.9)
BILIRUB SERPL-MCNC: 0.3 MG/DL (ref 0.1–1)
BILIRUB UR QL STRIP: NEGATIVE
BUN SERPL-MCNC: 35 MG/DL (ref 8–23)
CALCIUM SERPL-MCNC: 9.5 MG/DL (ref 8.7–10.5)
CHLORIDE SERPL-SCNC: 97 MMOL/L (ref 95–110)
CLARITY UR: CLEAR
CO2 SERPL-SCNC: 25 MMOL/L (ref 23–29)
COLOR UR: YELLOW
CREAT SERPL-MCNC: 1.5 MG/DL (ref 0.5–1.4)
DIFFERENTIAL METHOD: ABNORMAL
EOSINOPHIL # BLD AUTO: 0.2 K/UL (ref 0–0.5)
EOSINOPHIL NFR BLD: 2.9 % (ref 0–8)
ERYTHROCYTE [DISTWIDTH] IN BLOOD BY AUTOMATED COUNT: 12.9 % (ref 11.5–14.5)
EST. GFR  (AFRICAN AMERICAN): 36 ML/MIN/1.73 M^2
EST. GFR  (NON AFRICAN AMERICAN): 31 ML/MIN/1.73 M^2
GLUCOSE SERPL-MCNC: 120 MG/DL (ref 70–110)
GLUCOSE UR QL STRIP: NEGATIVE
HCT VFR BLD AUTO: 31.1 % (ref 37–48.5)
HGB BLD-MCNC: 10.4 G/DL (ref 12–16)
HGB UR QL STRIP: NEGATIVE
IMM GRANULOCYTES # BLD AUTO: 0.03 K/UL (ref 0–0.04)
KETONES UR QL STRIP: NEGATIVE
LEUKOCYTE ESTERASE UR QL STRIP: NEGATIVE
LYMPHOCYTES # BLD AUTO: 1 K/UL (ref 1–4.8)
LYMPHOCYTES NFR BLD: 12.2 % (ref 18–48)
MCH RBC QN AUTO: 30.8 PG (ref 27–31)
MCHC RBC AUTO-ENTMCNC: 33.4 G/DL (ref 32–36)
MCV RBC AUTO: 92 FL (ref 82–98)
MONOCYTES # BLD AUTO: 0.8 K/UL (ref 0.3–1)
MONOCYTES NFR BLD: 9.9 % (ref 4–15)
NEUTROPHILS # BLD AUTO: 6.1 K/UL (ref 1.8–7.7)
NEUTROPHILS NFR BLD: 74.1 % (ref 38–73)
NITRITE UR QL STRIP: NEGATIVE
NRBC BLD-RTO: 0 /100 WBC
PH UR STRIP: 6 [PH] (ref 5–8)
PLATELET # BLD AUTO: 167 K/UL (ref 150–350)
PMV BLD AUTO: 10.8 FL (ref 9.2–12.9)
POTASSIUM SERPL-SCNC: 4.5 MMOL/L (ref 3.5–5.1)
PROT SERPL-MCNC: 6.7 G/DL (ref 6–8.4)
PROT UR QL STRIP: ABNORMAL
RBC # BLD AUTO: 3.38 M/UL (ref 4–5.4)
SODIUM SERPL-SCNC: 131 MMOL/L (ref 136–145)
SP GR UR STRIP: 1.01 (ref 1–1.03)
URN SPEC COLLECT METH UR: ABNORMAL
UROBILINOGEN UR STRIP-ACNC: NEGATIVE EU/DL
WBC # BLD AUTO: 8.19 K/UL (ref 3.9–12.7)

## 2019-07-02 PROCEDURE — 93005 ELECTROCARDIOGRAM TRACING: CPT

## 2019-07-02 PROCEDURE — 36415 COLL VENOUS BLD VENIPUNCTURE: CPT

## 2019-07-02 PROCEDURE — 85025 COMPLETE CBC W/AUTO DIFF WBC: CPT

## 2019-07-02 PROCEDURE — 81003 URINALYSIS AUTO W/O SCOPE: CPT

## 2019-07-02 PROCEDURE — 99285 EMERGENCY DEPT VISIT HI MDM: CPT | Mod: 25

## 2019-07-02 PROCEDURE — 25000003 PHARM REV CODE 250: Performed by: EMERGENCY MEDICINE

## 2019-07-02 PROCEDURE — 80053 COMPREHEN METABOLIC PANEL: CPT

## 2019-07-02 RX ORDER — ONDANSETRON 2 MG/ML
4 INJECTION INTRAMUSCULAR; INTRAVENOUS
Status: DISCONTINUED | OUTPATIENT
Start: 2019-07-02 | End: 2019-07-02 | Stop reason: HOSPADM

## 2019-07-02 RX ORDER — ONDANSETRON 2 MG/ML
INJECTION INTRAMUSCULAR; INTRAVENOUS
Status: COMPLETED
Start: 2019-07-02 | End: 2019-07-02

## 2019-07-02 RX ADMIN — BACITRACIN ZINC, POLYMYXIN B SULFATE, NEOMYCIN SULFATE 1 EACH: 400; 5000; 3.5 OINTMENT TOPICAL at 04:07

## 2019-07-02 NOTE — TELEPHONE ENCOUNTER
----- Message from Gavi May sent at 7/2/2019 11:10 AM CDT -----  Contact: merissa Moreland   Call placed to pod ,no answer      Type:  Patient Returning Call    Who Called:  Patient son Markell  Who Left Message for Patient:  Jojo   Does the patient know what this is regarding?:  Yes   Best Call Back Number:[phone]    Additional Information: please call patient son cell at [phone] per son  Or [phone]

## 2019-07-02 NOTE — TELEPHONE ENCOUNTER
----- Message from Melissa Paredes sent at 7/2/2019  4:20 PM CDT -----  Type: Needs Medical Advice    Who Called:  Aniceto Calloway ( son )     Best Call Back Number: [phone]  Additional Information: pts son would like a phone call back from Dr Tinsley nurse - she is currently in the hospital ER and has taken a fall ( twice ) had appt for 7/3 and had to cancel appt please contact patient son directly

## 2019-07-02 NOTE — TELEPHONE ENCOUNTER
----- Message from Tony Turner sent at 7/2/2019  9:25 AM CDT -----  Contact: Tammie / son  Patient fell and hurt hip that had Sx, experiencing pain, please call son

## 2019-07-02 NOTE — TELEPHONE ENCOUNTER
Call placed to Mr. Moreland in regards to message received. He stated that he wanted to let Dr. Herbert know that Ms. Rudd was in the ER after falling, and they are running test. He would like him to take a look at her test to determine if she needs to come in to see him. I advised Mr. Moreland I would send a message to Dr. Herbert. He verbalized understanding. No further issues noted.

## 2019-07-02 NOTE — ED NOTES
Dr. Jara at bedside updating family and patient of results. Pt AAOx4. Resting comfortably. No further vomiting while in ED.

## 2019-07-02 NOTE — ED NOTES
Pt presents to ED via EMS with c/o nausea and dizziness. Upon arrival to the ED the patient was covered in vomit. According to EMS staff the pt vomited while pulling into the ED. Pt was given 4 mg zofran. Upon arrival the pt reported she was still nauseated. At this time the pt reports that her nausea is improving. No vomiting noted at this time. Pt c/o generalized fatigue, dizziness, and chest discomfort. Pts family at bedside. Pt was placed on cardiac monitor, BP cuff, and pulse ox upon arrival. Pt also reports mild hip pain from falling from her chair yesterday.

## 2019-07-02 NOTE — ED PROVIDER NOTES
Encounter Date: 7/2/2019    SCRIBE #1 NOTE: Matilda WOOD, britton scribing for, and in the presence of, Dr. Jara.       History     Chief Complaint   Patient presents with    Fall     Fell yesterday trying to sit in chair. Today patient became weak while ambulating with walker and slumped to the floor. Pt then began vomiting. Skin tear to LEFT arm noted     7/2/2019  3:17 PM     The patient is a 88 y.o. female  who presents with hip pain secondary to fall. Patient had two falls over the past day. Patient had an unwitnessed fall while getting out of her chair yesterday evening. She fell onto her left hip. She complains of acute, sharp pains to the left hip and left rib cage. Her hip pain has been constant since the fall. She has a skin tear to the left arm. Pt denies any head trauma or LOC during the fall. Patient took a hydrocodone for her pain this afternoon. She was sitting in the chair when she developed sudden dizziness and weakness while attempted to walk with her walker. Pt began to fall but was caught by her family member. Pt did not fall on the ground at that time. She had nausea and one episode of vomiting after the incident. Pt complains of current moderate nausea with lightheadedness. Pt recently had surgery on her left hip on 3/23/19. She denies any other pain or complaints at this time. PMHx of HTN, DM, HLD, COPD, pneumonia, DVT, PE, hip arthritis, anemia, CHF and aortic aneurysm. SHx of hysterectomy, appendectomy, cholecystomy, right hip fracture surery, hernia repair, percutaneous pinning of hip.    The history is provided by the patient.     Review of patient's allergies indicates:   Allergen Reactions    Carvedilol Other (See Comments)     Bradycardia and syncope    Boniva [ibandronate]     Codeine     Hydralazine analogues     Iodinated contrast- oral and iv dye Hives    Kionex [sodium polystyrene sulfonate] Diarrhea     From encounter 12/11/17 for diarrhea--not true allergy.   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 2018    Hip arthritis 3/1/2016    Homocysteinemia 2018    Hyperlipidemia     Hypertension     Normocytic normochromic anemia 2018    PE (pulmonary thromboembolism) 2018    Pneumonia of right lower lobe due to infectious organism 2017    Thyroid disease     hypothyroid     Past Surgical History:   Procedure Laterality Date    APPENDECTOMY      CHOLECYSTECTOMY      ESOPHAGOGASTRODUODENOSCOPY (EGD) N/A 10/25/2017    [facility]formed [facility] Flores [facility] at [facility] ENDO    FRACTURE SURGERY      right hip     HERNIA REPAIR      groin    HYSTERECTOMY      Insertion, Implantable Loop Recorder N/A 2018    Performed by Ashok Herbert [facility] at [facility] CATH LAB    PINNING, HIP, PERCUTANEOUS Left 3/23/2019    Per[facility] by [facility]je Hamlin [facility] at [facility] OR    VARICOSE VEIN SURGERY       Family History   Problem Relation Age of Onset    Hypertension Mother     Heart disease Mother     Lung disease Father     Diabetes Sister     Diabetes Brother     Kidney disease Son      Social History     Tobacco Use    Smoking status: Former Smoker     Packs/day: 0.35     Years: 44.00     Pack years: 15.40     Types: Cigarettes     Last attempt to quit: 2015     Years since quittin.1    Smokeless tobacco: Never Used    Tobacco comment: 2015   Substance Use Topics    Alcohol use: No     Alcohol/week: 0.0 oz     Frequency: Never     Binge  frequency: Never    Drug use: No     Review of Systems   Constitutional: Negative for appetite change, chills and fever.   HENT: Negative for congestion, rhinorrhea and sore throat.    Respiratory: Negative for cough and shortness of breath.    Cardiovascular: Negative for chest pain.   Gastrointestinal: Positive for nausea. Negative for abdominal pain, diarrhea and vomiting.   Genitourinary: Negative for dysuria.   Musculoskeletal: Positive for arthralgias. Negative for back pain and myalgias.   Skin: Positive for wound. Negative for rash.   Neurological: Positive for dizziness, weakness (generalized) and light-headedness. Negative for syncope and numbness.   Hematological: Does not bruise/bleed easily.       Physical Exam     Initial Vitals [07/02/19 1500]   BP Pulse Resp Temp SpO2   (!) 188/78 (!) 54 16 97.1 °F (36.2 °C) (!) 90 %      MAP       --         Physical Exam    Nursing note and vitals reviewed.  Constitutional: No distress.   HENT:   Head: Normocephalic and atraumatic. Head is without abrasion, without contusion and without laceration.   Mouth/Throat: Mucous membranes are normal.   Eyes: EOM are normal. Pupils are equal, round, and reactive to light.   Neck: Normal range of motion.   Cardiovascular: Normal rate, regular rhythm, normal heart sounds and intact distal pulses. Exam reveals no gallop and no friction rub.    No murmur heard.  Pulmonary/Chest: Breath sounds normal. She has no wheezes. She has no rhonchi. She has no rales. She exhibits tenderness.   TTP over the left ribs of the chest wall anterior to the axial line approximately 5 cm inferior to the left axilla. No bruising or ecchymosis seen.   Abdominal: Soft. She exhibits no distension. There is no tenderness.   Musculoskeletal: Normal range of motion. She exhibits no edema.   Pain with internal rotation of the left hip. Left hip TTP.   Neurological: She is alert and oriented to person, place, and time. She has normal strength.   Skin:  Skin is dry. No rash noted.   Skin tear to the left arm.   Psychiatric: She has a normal mood and affect.         ED Course   Procedures  Labs Reviewed   CBC W/ AUTO DIFFERENTIAL - Abnormal; Notable for the following components:       Result Value    RBC 3.38 (*)     Hemoglobin 10.4 (*)     Hematocrit 31.1 (*)     Gran% 74.1 (*)     Lymph% 12.2 (*)     All other components within normal limits   COMPREHENSIVE METABOLIC PANEL - Abnormal; Notable for the following components:    Sodium 131 (*)     Glucose 120 (*)     BUN, Bld 35 (*)     Creatinine 1.5 (*)     Alkaline Phosphatase 50 (*)     eGFR if  36 (*)     eGFR if non  31 (*)     All other components within normal limits   URINALYSIS, REFLEX TO URINE CULTURE - Abnormal; Notable for the following components:    Protein, UA Trace (*)     All other components within normal limits    Narrative:     Preferred Collection Type->Urine, Clean Catch     EKG Readings: (Independently Interpreted)   Bradycardiac rate of 53. Septal infarct age undetermined. Normal ST segments and T waves. No significant change from prior EKG done on 2/6/19.     ECG Results          EKG 12-lead (In process)  Result time 07/03/19 09:53:30    In process by Interface, Lab In [facility] (07/03/19 09:53:30)                 Narrative:    Test Reason : R07.9,    Vent. Rate : 053 BPM     Atrial Rate : 053 BPM     P-R Int : 192 ms          QRS Dur : 094 ms      QT Int : 474 ms       P-R-T Axes : 061 003 063 degrees     QTc Int : 444 ms    Sinus bradycardia  Septal infarct (cited on or before 02-JUL-2019)  Abnormal ECG  When compared with ECG of 06-JUN-2019 15:17,  T wave amplitude has decreased in Anterior leads    Referred By: [email or web address]   SELF           Confirmed By:                             Imaging Results          CT Head Without Contrast (Final result)  Result time 07/02/19 16:25:16    Final result by Veronica Olson MD (07/02/19 16:25:16)                  Impression:      Mild involutional changes and findings suggesting mild microvascular ischemic change.  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      Electronically signed by: Veronica Olson MD  Date:    07/02/2019  Time:    16:25             Narrative:    EXAMINATION:  CT HEAD WITHOUT CONTRAST    CLINICAL HISTORY:  Ataxia, after head trauma (<24 hours);    TECHNIQUE:  Low dose axial images were obtained through the head.  Coronal and sagittal reformations were also performed. Contrast was not administered.    COMPARISON:  11/29/2018 MRI brain, 7/15/2018 CT head    FINDINGS:  There is mild dilatation of ventricles, sulci, fissures.  There is decreased density in white matter consistent microvascular ischemic change.  There is area of encephalomalacia consistent with old infarct left medial occipital.  There is no acute intracranial hemorrhage.  There is no intracranial mass effect.  There is no acute major vascular territory infarct.  Note is made that MRI is typically more sensitive than CT particularly for detection of early or small nonhemorrhagic infarcts.  The calvarium appears intact, mastoids well pneumatized.  There are calcifications para and parasellar portions of internal carotid arteries and in vertebral arteries.  There is mucosal thickening in visualized portion of right maxillary sinus.                               X-Ray Hip 2 View Left (Final result)  Result time 07/02/19 16:16:13    Final result by Cesario Lan Jr., MD (07/02/19 16:16:13)                 Impression:      Prior ORIF of the left femur with 3 cannulated screws in place.  Fracture is no longer seen and angulation is good.  Prior right hip replacement with metallic acetabular and femoral components in good position.  Diffuse osteoporosis.  Degenerative disc disease at L4-5 and L5-S1.      Electronically signed by: Cesario Lan  MD  Date:    07/02/2019  Time:    16:16             Narrative:    EXAMINATION:  XR HIP 2 VIEW LEFT    CLINICAL HISTORY:  Pain in left hip    TECHNIQUE:  AP view of the pelvis and frog leg lateral view of the left hip were performed.    COMPARISON:  The left hip and pelvic x-ray of July 1, 2019    FINDINGS:  On the left the patient has had placement of 3 cannulated screws from the true subtrochanteric area into the femoral head.  Lucency around the hardware or fracture of the hardware is not seen.  The angle and apposition is excellent and a fracture line is no longer seen.  The patient has had prior right hip replacement with metallic acetabular and femoral components in good position.  There is some heterotopic bone formation above the greater trochanter.  Diffuse osteoporosis of the pelvis is noted                               X-Ray Ribs 2 View Left (Final result)  Result time 07/02/19 16:13:48    Final result by Cesario Lan Jr., MD (07/02/19 16:13:48)                 Impression:      A loop recorder is noted in the anterior left chest wall otherwise negative left rib x-rays.      Electronically signed by: Cesario Lan MD  Date:    07/02/2019  Time:    16:13             Narrative:    EXAMINATION:  XR RIBS 2 VIEW LEFT    CLINICAL HISTORY:  Other chest pain    TECHNIQUE:  Two views of the left ribs were performed.    COMPARISON:  Chest performed March 22, 2019.    FINDINGS:  On these images a fracture of the left ribs is not identified.  Underlying pleural or pulmonary abnormalities are not identified.  A loop recorder is noted in the anterior left chest.                                 Medical Decision Making:   History:   Old Medical Records: I decided to obtain old medical records.  Initial Assessment:   88-year-old woman presents for evaluation of fall yesterday and associated episode of nausea vomiting today.  Patient does not think she hit her head but fell backwards when she  chair yesterday.  She  has a history of left hip replacement and complains of pain in the left hip.  She has full range of motion. No shortening or rotation.  X-ray obtained showed no acute fracture dislocation.  CT head performed and was unremarkable. Patient also with tenderness over the left chest wall.  X-ray shows no rib fracture.  No pneumothorax.  Patient feels improved on re-evaluation.  She is given Zofran with no further episodes of emesis and tolerate oral intake.  Patient had a small skin tear on her left arm in wound care was provided with application of antibiotic ointment.  No significant laboratory abnormalities or presence of urinary tract infection to warrant admission.  Patient is discharged improved in no acute distress.  Return precautions discussed.  Clinical Tests:   Lab Tests: Ordered and Reviewed  Medical Tests: Reviewed and Ordered            Scribe Attestation:   Scribe #1: I performed the above scribed service and the documentation accurately describes the services I performed. I attest to the accuracy of the note.     I, Marek Turner, personally performed the services described in this documentation. All medical record entries made by the scribe were at my direction and in my presence.  I have reviewed the chart and agree that the record reflects my personal performance and is accurate and complete. Ruben Jara MD.           Clinical Impression:       ICD-10-CM ICD-9-CM   1. Non-intractable vomiting with nausea, unspecified vomiting type R11.2 787.01   2. Chest pain R07.9 786.50   3. Left hip pain M25.552 719.45   4. Chest wall pain R07.89 786.52   5. Contusion of left hip, initial encounter S70.02XA 924.01         Disposition:   Disposition: Discharged  Condition: Stable                        Ruben Jara MD  07/03/19 1631

## 2019-07-03 ENCOUNTER — TELEPHONE (OUTPATIENT)
Dept: FAMILY MEDICINE | Facility: CLINIC | Age: 84
End: 2019-07-03

## 2019-07-03 NOTE — TELEPHONE ENCOUNTER
----- Message from Daysi Child sent at 7/3/2019 11:34 AM CDT -----  Contact: Patient  Type:  Sooner Apoointment Request    Caller is requesting a sooner appointment.  Caller declined first available appointment listed below.  Caller will not accept being placed on the waitlist and is requesting a message be sent to doctor.    Name of Caller:  Patient  When is the first available appointment?  7/26  Symptoms:  Emergency room follow up  Best Call Back Number:  [phone]  Additional Information:  patient was seen in the emergency room on 7/2

## 2019-07-03 NOTE — PROGRESS NOTES
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 8/7/2018    Hip arthritis 3/1/2016    Homocysteinemia 8/7/2018    Hyperlipidemia     Hypertension     Normocytic normochromic anemia 6/29/2018    PE (pulmonary thromboembolism) 06/09/2018    Pneumonia of right lower lobe due to infectious organism 9/11/2017    Thyroid disease     hypothyroid       Past Surgical History:   Procedure Laterality Date    APPENDECTOMY      CHOLECYSTECTOMY      ESOPHAGOGASTRODUODENOSCOPY (EGD) N/A 10/25/2017    Performed by Fadi Flores [facility] at [facility] ENDO    FRACTURE SURGERY      right hip     HERNIA REPAIR      groin    HYSTERECTOMY      Insertion, Implantable Loop Recorder N/A 12/12/2018    Performed by Ashok Herbert MD at [facility] CATH LAB    PINNING, HIP, PERCUTANEOUS Left 3/23/2019    Performed by Jorje Hamlin MD at [facility] OR    VARICOSE VEIN SURGERY         Current Outpatient Medications   Medication Sig    acetaminophen (TYLENOL) 325 MG tablet Take 2 tablets (650 mg total) by mouth every 4 (four) hours as needed. (Patient taking differently: Take 650 mg by mouth every 4 (four) hours as needed for Pain. )    albuterol-ipratropium (DUO-NEB) 2.5 mg-0.5 mg/3 mL nebulizer solution USE ONE VIAL IN NEBULIZER EVERY 6 HOURS WHILE AWAKE (Patient taking differently: USE ONE VIAL IN NEBULIZER EVERY 6 HOURS WHILE AWAKE PRN SOB)    amLODIPine (NORVASC) 5 MG tablet Take 1 tablet (5 mg total) by mouth once daily.    ascorbic acid (VITAMIN C) 500  MG tablet Take 500 mg by mouth once daily.      aspirin (ECOTRIN) 81 MG EC tablet Take 81 mg by mouth once daily.      calcium carbonate (OS-TASIA) 500 mg calcium (1,250 mg) tablet Take 1 tablet by mouth 2 (two) times daily.      ELIQUIS 5 mg Tab TAKE 1 TABLET BY MOUTH TWICE DAILY    ferrous sulfate (FEOSOL) 325 mg (65 mg iron) Tab tablet Take 1 tablet (325 mg total) by mouth 2 (two) times daily with meals.    fish oil-omega-3 fatty acids 300-1,000 mg capsule Take 2 g by mouth every evening.     fluticasone (FLONASE) 50 mcg/actuation nasal spray 2 sprays by Each Nare route once daily.    fluticasone-salmeterol 232-14 mcg/actuation AePB Inhale 1 puff into the lungs 2 (two) times daily.    FOLBIC 2.5-25-2 mg Tab TAKE 1 TABLET BY MOUTH ONCE DAILY    furosemide (LASIX) 20 MG tablet Take 1 tablet on Tuesdays and Thursdays.    gabapentin (NEURONTIN) 300 MG capsule TAKE ONE CAPSULE BY MOUTH IN THE EVENING    JANUVIA 25 mg Tab TAKE 1 TABLET BY MOUTH ONCE DAILY    levothyroxine (SYNTHROID) 88 MCG tablet Take 1 tablet (88 mcg total) by mouth before breakfast.    magnesium oxide (MAG-OX) 400 mg tablet TAKE ONE TABLET BY MOUTH ONCE DAILY    multivitamin (THERAGRAN) tablet Take 1 tablet by mouth once daily.    nitroGLYCERIN (NITROSTAT) 0.4 MG SL tablet Place 1 tablet (0.4 mg total) under the tongue every 5 (five) minutes as needed for Chest pain. As needed (Patient taking differently: Place 0.4 mg under the tongue every 5 (five) minutes as needed for Chest pain. Seek medical help if pain is not relieved after the third dose.)    predniSONE (DELTASONE) 20 MG tablet Take one daily for 3 days then repeat for bronchitis (Patient taking differently: Take 20 mg by mouth daily as needed (Bronchitis Flare). Take one daily for 3 days then repeat for bronchitis)    senna-docusate 8.6-50 mg (PERICOLACE) 8.6-50 mg per tablet Take 1 tablet by mouth 2 (two) times daily.    simvastatin (ZOCOR) 40 MG tablet TAKE ONE TABLET BY  MOUTH ONCE DAILY IN THE EVENING    vitamin D 1000 units Tab Take 1 tablet (1,000 Units total) by mouth once daily.     No current facility-administered medications for this visit.        Review of patient's allergies indicates:   Allergen Reactions    Carvedilol Other (See Comments)     Bradycardia and syncope    Boniva [ibandronate]     Codeine     Hydralazine analogues     Iodinated contrast- oral and iv dye Hives    Kionex [sodium polystyrene sulfonate] Diarrhea     From encounter 17 for diarrhea--not true allergy.    Lisinopril     Morphine        Family History   Problem Relation Age of Onset    Hypertension Mother     Heart disease Mother     Lung disease Father     Diabetes Sister     Diabetes Brother     Kidney disease Son        Social History     Socioeconomic History    Marital status: Legally      Spouse name: Not on file    Number of children: Not on file    Years of education: Not on file    Highest education level: Not on file   Occupational History    Not on file   Social Needs    Financial resource strain: Not very hard    Food insecurity:     Worry: Never true     Inability: Never true    Transportation needs:     Medical: No     Non-medical: No   Tobacco Use    Smoking status: Former Smoker     Packs/day: 0.35     Years: 44.00     Pack years: 15.40     Types: Cigarettes     Last attempt to quit: 2015     Years since quittin.1    Smokeless tobacco: Never Used    Tobacco comment: 2015   Substance and Sexual Activity    Alcohol use: No     Alcohol/week: 0.0 oz     Frequency: Never     Binge frequency: Never    Drug use: No    Sexual activity: Never   Lifestyle    Physical activity:     Days per week: 0 days     Minutes per session: 0 min    Stress: Only a little   Relationships    Social connections:     Talks on phone: More than three times a week     Gets together: More than three times a week     Attends [employer] service: Not on file      Active member of club or organization: No     Attends meetings of clubs or organizations: Never     Relationship status:    Other Topics Concern    Not on file   Social History Narrative    Not on file       Chief Complaint:   Chief Complaint   Patient presents with    Hip Injury     L hip 6wk f/u       Date of surgery:  March 23, 2019    History of present illness:  88-year-old female who underwent left closed reduction percutaneous pinning of a femoral neck fracture 3 months ago.  Patient has no pain today. No wound issues.  Still has some pain with walking.  Pain at night when laying on that side.  Pain is 0/10.      Review of Systems:    Musculoskeletal:  See HPI        Physical Examination:    Vital Signs:    Vitals:    07/01/19 1504   BP: (!) 136/58   Pulse: 70       Body mass index is 21.97 kg/m².    This a well-developed, well nourished patient in no acute distress.  They are alert and oriented and cooperative to examination.  Pt.  Walks with a walker.    Examination left hip shows well-healed surgical incision. No erythema or drainage.  Minimal swelling.  Mild pain with range of motion of the hip.    X-rays:  X-rays left hip are ordered and reviewed which show percutaneous pinning of the left femoral neck.  No change compared to the last x-ray.      Assessment::  Status post CRPP left femoral neck fracture    Plan:  I reviewed the findings with her today.  Continue weight-bearing as tolerated with an assistive device.  Follow up in 8 weeks with another x-ray of the left hip.    This note was created using M Mod[employer] voice recognition software that occasionally misinterpreted phrases or words.

## 2019-07-08 ENCOUNTER — OFFICE VISIT (OUTPATIENT)
Dept: FAMILY MEDICINE | Facility: CLINIC | Age: 84
End: 2019-07-08
Payer: MEDICARE

## 2019-07-08 VITALS
HEART RATE: 68 BPM | TEMPERATURE: 99 F | OXYGEN SATURATION: 93 % | HEIGHT: 63 IN | BODY MASS INDEX: 22.86 KG/M2 | DIASTOLIC BLOOD PRESSURE: 58 MMHG | SYSTOLIC BLOOD PRESSURE: 128 MMHG | WEIGHT: 129 LBS

## 2019-07-08 DIAGNOSIS — S41.112D SKIN TEAR OF LEFT UPPER ARM WITHOUT COMPLICATION, SUBSEQUENT ENCOUNTER: ICD-10-CM

## 2019-07-08 DIAGNOSIS — R53.1 WEAKNESS: ICD-10-CM

## 2019-07-08 DIAGNOSIS — K59.00 CONSTIPATION, UNSPECIFIED CONSTIPATION TYPE: ICD-10-CM

## 2019-07-08 DIAGNOSIS — R53.81 DEBILITY: ICD-10-CM

## 2019-07-08 DIAGNOSIS — R29.6 FREQUENT FALLS: Primary | ICD-10-CM

## 2019-07-08 DIAGNOSIS — S72.002D CLOSED LEFT HIP FRACTURE, WITH ROUTINE HEALING, SUBSEQUENT ENCOUNTER: ICD-10-CM

## 2019-07-08 DIAGNOSIS — M25.552 HIP PAIN, LEFT: ICD-10-CM

## 2019-07-08 PROCEDURE — 3288F PR FALLS RISK ASSESSMENT DOCUMENTED: ICD-10-PCS | Mod: CPTII,S$GLB,, | Performed by: NURSE PRACTITIONER

## 2019-07-08 PROCEDURE — 1100F PR PT FALLS ASSESS DOC 2+ FALLS/FALL W/INJURY/YR: ICD-10-PCS | Mod: CPTII,S$GLB,, | Performed by: NURSE PRACTITIONER

## 2019-07-08 PROCEDURE — 1100F PTFALLS ASSESS-DOCD GE2>/YR: CPT | Mod: CPTII,S$GLB,, | Performed by: NURSE PRACTITIONER

## 2019-07-08 PROCEDURE — 99214 PR OFFICE/OUTPT VISIT, EST, LEVL IV, 30-39 MIN: ICD-10-PCS | Mod: S$GLB,,, | Performed by: NURSE PRACTITIONER

## 2019-07-08 PROCEDURE — 99214 OFFICE O/P EST MOD 30 MIN: CPT | Mod: S$GLB,,, | Performed by: NURSE PRACTITIONER

## 2019-07-08 PROCEDURE — 3288F FALL RISK ASSESSMENT DOCD: CPT | Mod: CPTII,S$GLB,, | Performed by: NURSE PRACTITIONER

## 2019-07-08 PROCEDURE — 99999 PR PBB SHADOW E&M-EST. PATIENT-LVL V: CPT | Mod: PBBFAC,,, | Performed by: NURSE PRACTITIONER

## 2019-07-08 PROCEDURE — 99999 PR PBB SHADOW E&M-EST. PATIENT-LVL V: ICD-10-PCS | Mod: PBBFAC,,, | Performed by: NURSE PRACTITIONER

## 2019-07-08 RX ORDER — MUPIROCIN 20 MG/G
OINTMENT TOPICAL ONCE
Status: COMPLETED | OUTPATIENT
Start: 2019-07-08 | End: 2019-07-09

## 2019-07-08 RX ORDER — MUPIROCIN 20 MG/G
OINTMENT TOPICAL 2 TIMES DAILY
Qty: 30 G | Refills: 1 | Status: SHIPPED | OUTPATIENT
Start: 2019-07-08 | End: 2021-08-26 | Stop reason: ALTCHOICE

## 2019-07-08 RX ORDER — SODIUM POLYSTYRENE SULFONATE 4.1 MEQ/G
POWDER, FOR SUSPENSION ORAL; RECTAL
Refills: 0 | COMMUNITY
Start: 2019-06-20 | End: 2019-09-13 | Stop reason: ALTCHOICE

## 2019-07-08 NOTE — PATIENT INSTRUCTIONS
Try dulcolax suppository. If unable to stimulate bowel movement, try magnesium citrate. Take miralax daily.     Wound care  Cleanse wound twice daily.  Apply antibiotic ointment and sterile bandage twice daily and as needed.  Monitor for increased pain, pus, redness and swelling.  If occurs then return to clinic or go to the emergency room if clinic is closed for a recheck.

## 2019-07-08 NOTE — PROGRESS NOTES
Subjective:       Patient ID: Blaire Cage is a 88 y.o. female.    Chief Complaint: ER f/u    Ms. Cage presents today for an ED F/U. Was seen at [facility] on 7/2 after experiencing 2 falls in a 24 hour period. First fall occured when she stood to get out of her chair yesterday, fell onto her left hip. No LOC. Had some sharp pain in the left hip and left rib cage. XR showed no acute fractures of the hip or the ribs. Recent ORIF of the left hip also looked good. Second fall occurred when she developed sudden dizziness and weakness while walking with her walker. Was caught by a family member and did not fall to the ground. After this fall, began to experience nausea and vomiting. CT of head was unremarkable. Urinalysis was not concerning for a UTI.     Here today with her daughter. Tells me she has fallen 2 more times since seen at ED. Has a skin tear on her left forearm they have been dressing at home. Ms. Cage tells me that she is spending more time in bed and doing less out of fear. Left hip is also painful. Felt like she has regressed. Was doing much better when participating with PT, but she was discharged from .     Patient Active Problem List   Diagnosis    Diabetic neuropathy, type II diabetes mellitus    CKD (chronic kidney disease), stage III, eGFR 39, progressive 31    Hypothyroidism    Hypertension associated with diabetes    Hyperlipidemia associated with type 2 diabetes mellitus    Type 2 diabetes mellitus with stage 3 chronic kidney disease    Right carotid bruit    COPD mixed type    Anxiety    Tobacco dependence    Abdominal aortic aneurysm without rupture    Hip arthritis    Degenerative spinal arthritis    Osteoporosis    Left ventricular diastolic dysfunction with preserved systolic function    Hypertensive left ventricular hypertrophy, without heart failure    Smoker, quit 5/2016, 25 pck-years    Abdominal obesity    Absolute anemia    Body mass index  (BMI) 23.0-23.9, adult, today 24.1    Proteinuria due to type 2 diabetes mellitus    History of syncope in childhood    Prerenal azotemia    Drug-induced constipation    COPD with acute exacerbation    Asthmatic bronchitis with acute exacerbation    Shortness of breath    Controlled type 2 diabetes mellitus, without long-term current use of insulin    Acute pulmonary embolism    SOB (shortness of breath)    Asthma-COPD overlap syndrome    Eosinophilic asthma    Moderate malnutrition    Debility    Type 2 diabetes mellitus with kidney complication, without long-term current use of insulin    Chronic anticoagulation    Constipation    DVT (deep venous thrombosis)    History of pulmonary embolism    Anemia due to multiple mechanisms    Anemia, chronic disease    Normocytic normochromic anemia    Anemia, chronic renal failure, stage 3 (moderate)    Homocysteinemia    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    Hyperkalemia    Diastolic CHF, chronic    Anemia of chronic disease    Kidney stones    Macrocytic anemia    Recurrent syncope    Orthostatic hypotension    Closed left hip fracture    Hip pain, left     Review of Systems   Constitutional: Positive for activity change. Negative for unexpected weight change.   HENT: Negative for hearing loss, rhinorrhea and trouble swallowing.    Eyes: Negative for discharge and visual disturbance.   Respiratory: Negative for chest tightness and wheezing.    Cardiovascular: Negative for chest pain and palpitations.   Gastrointestinal: Negative for blood in stool, constipation, diarrhea and vomiting.   Endocrine: Negative for polydipsia and polyuria.   Genitourinary: Negative for difficulty urinating, dysuria, hematuria and menstrual problem.   Musculoskeletal: Negative for arthralgias, joint swelling and neck pain.        Hip pain   Skin: Positive for wound.   Neurological: Positive for weakness. Negative for headaches.   Psychiatric/Behavioral: Negative  for confusion and dysphoric mood.       Objective:      Physical Exam   Constitutional: She is oriented to person, place, and time.   In wheelchair    Cardiovascular: Normal rate, regular rhythm and normal heart sounds.   Pulmonary/Chest: Effort normal and breath sounds normal. She has no wheezes.   Musculoskeletal: She exhibits no edema.   Neurological: She is alert and oriented to person, place, and time.   Skin: Skin is warm and dry.        Psychiatric: She has a normal mood and affect.   Nursing note and vitals reviewed.      Assessment:       1. Frequent falls    2. Debility    3. Weakness    4. Hip pain, left    5. Constipation, unspecified constipation type    6. Closed left hip fracture, with routine healing, subsequent encounter    7. Skin tear of left upper arm without complication, subsequent encounter        Plan:       Blaire was seen today for er f/u.    Diagnoses and all orders for this visit:    Frequent falls  -     Ambulatory referral to Home Health  -     Ambulatory referral to Orthopedics        -     URINALYSIS; Future  Repeat urine, patient to return sample     Debility  -     Ambulatory referral to Home Health    Weakness  -     Ambulatory referral to Home Health    Hip pain, left  -     Ambulatory referral to Orthopedics  See ortho due to increasing hip pain  XR from ED reviewed with patient    Constipation, unspecified constipation type  Patient was counseled that these lifestyle changes can help prevent constipation:  · Diet. Eat a high-fiber diet, with fresh fruit and vegetables, and reduce dairy intake, meats, and processed foods.  An over the counter fiber supplement is recommended such as Fiberchoice chewables or Metamucil.  · Fluids. It's important to get enough fluids each day. Drink plenty of water when you eat more fiber. If you are on diet that limits the amount of fluid you can have, talk about this with your health care provider.  · Regular exercise. Check with your health care  provider first.  I also advised use of over the counter stool softeners like docusate as needed for persistent constipation.  OTC probiotics may also be of benefit like Align to help regulation.  If acutely constipated the patient was advised to use otc fleets enema(s) and/or magnesium citrate to relieve symptoms.      Recommend dulcolax suppository. If no relief, try magnesium citrate. miralax daily    Closed left hip fracture, with routine healing, subsequent encounter  -     Ambulatory referral to Orthopedics    Skin tear of left upper arm without complication, subsequent encounter  -     mupirocin (BACTROBAN) 2 % ointment; Apply topically 2 (two) times daily.  -     mupirocin 2 % ointment  Cleanse wound twice daily.  Apply antibiotic ointment and sterile bandage twice daily and as needed.  Monitor for increased pain, pus, redness and swelling.  If occurs then return to clinic or go to the emergency room if clinic is closed for a recheck.    Monitor for increased bleeding- patient on eliquis. Family verbalized understanding.     F/U 1 month

## 2019-07-09 RX ADMIN — MUPIROCIN: 20 OINTMENT TOPICAL at 08:07

## 2019-07-12 ENCOUNTER — LAB VISIT (OUTPATIENT)
Dept: LAB | Facility: HOSPITAL | Age: 84
End: 2019-07-12
Attending: NURSE PRACTITIONER
Payer: MEDICARE

## 2019-07-12 DIAGNOSIS — R29.6 FREQUENT FALLS: ICD-10-CM

## 2019-07-12 LAB
BACTERIA #/AREA URNS AUTO: ABNORMAL /HPF
BILIRUB UR QL STRIP: NEGATIVE
CLARITY UR REFRACT.AUTO: ABNORMAL
COLOR UR AUTO: YELLOW
GLUCOSE UR QL STRIP: NEGATIVE
HGB UR QL STRIP: NEGATIVE
HYALINE CASTS UR QL AUTO: 0 /LPF
KETONES UR QL STRIP: NEGATIVE
LEUKOCYTE ESTERASE UR QL STRIP: ABNORMAL
MICROSCOPIC COMMENT: ABNORMAL
NITRITE UR QL STRIP: NEGATIVE
PH UR STRIP: 5 [PH] (ref 5–8)
PROT UR QL STRIP: ABNORMAL
RBC #/AREA URNS AUTO: 9 /HPF (ref 0–4)
SP GR UR STRIP: 1.01 (ref 1–1.03)
URN SPEC COLLECT METH UR: ABNORMAL
WBC #/AREA URNS AUTO: >100 /HPF (ref 0–5)
WBC CLUMPS UR QL AUTO: ABNORMAL

## 2019-07-12 PROCEDURE — 81001 URINALYSIS AUTO W/SCOPE: CPT

## 2019-07-14 ENCOUNTER — HOSPITAL ENCOUNTER (EMERGENCY)
Facility: HOSPITAL | Age: 84
Discharge: HOME | End: 2019-07-14
Attending: EMERGENCY MEDICINE
Payer: MEDICARE

## 2019-07-14 VITALS
WEIGHT: 128 LBS | TEMPERATURE: 98 F | HEIGHT: 63 IN | SYSTOLIC BLOOD PRESSURE: 150 MMHG | OXYGEN SATURATION: 93 % | DIASTOLIC BLOOD PRESSURE: 67 MMHG | RESPIRATION RATE: 18 BRPM | HEART RATE: 61 BPM | BODY MASS INDEX: 22.68 KG/M2

## 2019-07-14 DIAGNOSIS — G89.29 CHRONIC LEFT HIP PAIN: Primary | ICD-10-CM

## 2019-07-14 DIAGNOSIS — M25.552 CHRONIC LEFT HIP PAIN: Primary | ICD-10-CM

## 2019-07-14 DIAGNOSIS — R82.71 BACTERIA IN URINE: Primary | ICD-10-CM

## 2019-07-14 DIAGNOSIS — R29.6 FREQUENT FALLS: ICD-10-CM

## 2019-07-14 PROCEDURE — 25000003 PHARM REV CODE 250: Performed by: EMERGENCY MEDICINE

## 2019-07-14 PROCEDURE — 99284 EMERGENCY DEPT VISIT MOD MDM: CPT | Mod: 25

## 2019-07-14 RX ORDER — OXYCODONE AND ACETAMINOPHEN 5; 325 MG/1; MG/1
1 TABLET ORAL
Status: COMPLETED | OUTPATIENT
Start: 2019-07-14 | End: 2019-07-14

## 2019-07-14 RX ADMIN — OXYCODONE AND ACETAMINOPHEN 1 TABLET: 5; 325 TABLET ORAL at 05:07

## 2019-07-14 NOTE — ED PROVIDER NOTES
Encounter Date: 7/14/2019    SCRIBE #1 NOTE: Justa WOOD, britton scribing for, and in the presence of, Ruben Jara MD.       History     Chief Complaint   Patient presents with    Fall     2 weeks ago     Hip Pain     left        Time seen by provider: 5:15 PM on 07/14/2019    Blaire Cage is a 88 y.o. female with complaints of persistent left hip pain s/p a mechanical fall that occurred x2 weeks ago (7/2). The patient was seen in the ED for evaluation of the hip and N/V immediately after the fall. She had negative x-ray of the hip and chest and negative head CT at the time. The patient has since visited with family medicine who report the patient has had 2 falls since the last ED visit. The patient denies taking any OTC medications for pain. She denies nausea and vomiting today. Denies numbness, weakness, and onset of any other new symptoms. The patient has no other medical concerns or complaints at this moment. She mentions recently having surgery on her left hip on 3/23/19. PMHx of HTN, DM, HLD, COPD, pneumonia, DVT, PE, hip arthritis, anemia, CHF and aortic aneurysm. SHx of hysterectomy, appendectomy, cholecystomy, right hip fracture surery, hernia repair, percutaneous pinning of hip.    The history is provided by the patient and a relative.     Review of patient's allergies indicates:   Allergen Reactions    Carvedilol Other (See Comments)     Bradycardia and syncope    Boniva [ibandronate]     Codeine     Hydralazine analogues     Iodinated contrast- oral and iv dye Hives    Kionex [sodium polystyrene sulfonate] Diarrhea     From encounter 12/11/17 for diarrhea--not true allergy.   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 2018    Hip arthritis 3/1/2016    Homocysteinemia 2018    Hyperlipidemia     Hypertension     Normocytic normochromic anemia 2018    PE (pulmonary thromboembolism) 2018    Pneumonia of right lower lobe due to infectious organism 2017    Thyroid disease     hypothyroid     Past Surgical History:   Procedure Laterality Date    APPENDECTOMY      CHOLECYSTECTOMY      ESOPHAGOGASTRODUODENOSCOPY (EGD) N/A 10/25/2017    [facility] ENDO    FRACTURE SURGERY      right hip     HERNIA REPAIR      groin    HYSTERECTOMY      Insertion, Implantable Loop Recorder N/A 2018    Performed by Ashok Herbert [facility] at [facility] CATH LAB    PINNING, HIP, PERCUTANEOUS Left 3/23/2019    [facility] by [facility]lin [facility] at [facility] OR    VARICOSE VEIN SURGERY       Family History   Problem Relation Age of Onset    Hypertension Mother     Heart disease Mother     Lung disease Father     Diabetes Sister     Diabetes Brother     Kidney disease Son      Social History     Tobacco Use    Smoking status: Former Smoker     Packs/day: 0.35     Years: 44.00     Pack years: 15.40     Types: Cigarettes     Last attempt to quit: 2015     Years since quittin.2    Smokeless tobacco: Never Used    Tobacco comment: 2015   Substance Use Topics    Alcohol use: No     Alcohol/week: 0.0 oz     Frequency: Never     Binge frequency: Never    Drug use: No     Review of Systems   Constitutional: Negative for chills and fever.   HENT: Negative for facial swelling.    Respiratory: Negative for cough and shortness of breath.    Cardiovascular: Negative for chest pain.   Gastrointestinal: Negative for abdominal pain, nausea and  vomiting.   Musculoskeletal: Positive for arthralgias (left hip). Negative for back pain and neck pain.   Skin: Negative for pallor and rash.   Neurological: Negative for numbness.   Hematological: Does not bruise/bleed easily.   Psychiatric/Behavioral: The patient is not nervous/anxious.        Physical Exam     Initial Vitals [07/14/19 1656]   BP Pulse Resp Temp SpO2   (!) 131/56 (!) 58 18 98 °F (36.7 °C) 96 %      MAP       --         Physical Exam    Nursing note and vitals reviewed.  Constitutional: She appears well-developed and well-nourished. She is not diaphoretic.  Non-toxic appearance. No distress.   HENT:   Head: Normocephalic and atraumatic.   Eyes: EOM are normal. Pupils are equal, round, and reactive to light.   Neck: Normal range of motion. Neck supple. No neck rigidity. No JVD present.   Cardiovascular: Normal rate, regular rhythm, normal heart sounds and intact distal pulses. Exam reveals no gallop and no friction rub.    No murmur heard.  Pulses:       Dorsalis pedis pulses are 2+ on the right side, and 2+ on the left side.        Posterior tibial pulses are 2+ on the right side, and 2+ on the left side.   Pulmonary/Chest: Breath sounds normal. She has no wheezes. She has no rhonchi. She has no rales.   Abdominal: Soft. Bowel sounds are normal. She exhibits no distension. There is no tenderness. There is no rebound and no guarding.   Musculoskeletal:        Left hip: She exhibits decreased range of motion and tenderness.   Tenderness over the distal femur of the left leg. Tenderness over the anterior left hip and lateral left hip. Refuses to perform external or internal rotation secondary to pain. 2+ pedal pulses bilaterally.    Neurological: She is alert and oriented to person, place, and time. She has normal reflexes. No cranial nerve deficit.   Skin: Skin is warm and dry.   Psychiatric: She has a normal mood and affect. Her speech is normal and behavior is normal. She is not actively  hallucinating.         ED Course   Procedures  Labs Reviewed - No data to display       Imaging Results          CT Thigh Without Contrast Left (Final result)  Result time 07/15/19 08:47:28   Procedure changed from CT Thigh With Contrast Left     Final result by Veronica Olson MD (07/15/19 08:47:28)                 Impression:      Old internally fixed left femoral fracture with no acute fracture.  If strong or continue clinical consideration for such MRI could be considered for further evaluation    Final read      Electronically signed by: Veronica Olson MD  Date:    07/15/2019  Time:    08:47             Narrative:    EXAMINATION:  CT THIGH WITHOUT CONTRAST LEFT    CLINICAL HISTORY:  Upper leg trauma, fx known or suspected, xray insufficient;    TECHNIQUE:  Axial scans of the left thigh were obtained with scans obtained from the inferior aspect of the left iliac crest through the left femur.  Multiplanar 2D   and 3D   reformatted images were obtained.    COMPARISON:  None    FINDINGS:  There are degenerative changes at the hips and there are corticated juxta-articular ossifications appearing old.  There is old internally fixed left femoral neck fracture with multiple screws.  There is some image degradation from artifact.  There is no acute fractures seen..  There are vascular calcifications.  Degenerative changes are evident at the knee.  Injection granulomas noted in the buttock.  Visualized portion of the pelvis shows diverticulosis without CT findings of acute diverticulitis, suggest prior hysterectomy and mild diffuse bladder wall thickening.  There is mild subcutaneous fat stranding with no mass or drainable hematoma.  Degenerative changes and very small joint effusion are evident at the knee.  The skeletal structures are osteopenic.                                 Medical Decision Making:   History:   Old Medical Records: I decided to obtain old medical records.  Initial Assessment:   Patient is an  88-year-old woman with chronic left hip pain, old internally fixed for moral fracture who presents emergency department for evaluation of acute on chronic left hip pain.  Pain is gradually been worsening.  Patient is afraid of walking as she has frequent falls.  She has been evaluated recently in the ER as well as by primary care physician in set up for home health/PT since this has helped in the past.  Patient sees our orthopedist this week.  Patient is afebrile well-appearing and nontoxic.  I have low suspicion for septic joint.  She was evaluated for same symptoms similarly with negative laboratory workup.  CT was obtained to rule out occult fracture.  This shows no acute fracture or dislocation.  Care turned over to the Dr. Alvarez at end of shift.  Patient was provided with [place] in the ER with improvement.  I am okay with discharging her with a pain medicine prescription and orthopedic follow-up.  Clinical Tests:   Radiological Study: Reviewed and Ordered            Scribe Attestation:   Scribe #1: I performed the above scribed service and the documentation accurately describes the services I performed. I attest to the accuracy of the note.      I, Marek Turner, personally performed the services described in this documentation. All medical record entries made by the scribe were at my direction and in my presence.  I have reviewed the chart and agree that the record reflects my personal performance and is accurate and complete. Ruben Jara MD.          ED Course as of Jul 15 2139   Sun Jul 14, 2019 2013 Ct negative of the hip.  Pt has close follow up with primary care and ortho.  Will give another rx for wheelchair and stable for dc.     [JS]      ED Course User Index  [JS] Tony Mckeon MD     Clinical Impression:       ICD-10-CM ICD-9-CM   1. Chronic left hip pain M25.552 719.45    G89.29 338.29   2. Frequent falls R29.6 V15.88         Disposition:   Disposition: Discharged  Condition:  Stable                        Ruben Jara MD  07/15/19 6042

## 2019-07-15 ENCOUNTER — PATIENT MESSAGE (OUTPATIENT)
Dept: FAMILY MEDICINE | Facility: CLINIC | Age: 84
End: 2019-07-15

## 2019-07-15 ENCOUNTER — CLINICAL SUPPORT (OUTPATIENT)
Dept: CARDIOLOGY | Facility: CLINIC | Age: 84
End: 2019-07-15
Attending: INTERNAL MEDICINE
Payer: MEDICARE

## 2019-07-15 DIAGNOSIS — Z95.818 STATUS POST PLACEMENT OF IMPLANTABLE LOOP RECORDER: ICD-10-CM

## 2019-07-15 DIAGNOSIS — R55 RECURRENT SYNCOPE: ICD-10-CM

## 2019-07-16 ENCOUNTER — PATIENT MESSAGE (OUTPATIENT)
Dept: FAMILY MEDICINE | Facility: CLINIC | Age: 84
End: 2019-07-16

## 2019-07-16 ENCOUNTER — TELEPHONE (OUTPATIENT)
Dept: FAMILY MEDICINE | Facility: CLINIC | Age: 84
End: 2019-07-16

## 2019-07-16 RX ORDER — APIXABAN 5 MG/1
TABLET, FILM COATED ORAL
Qty: 60 TABLET | Refills: 5 | Status: SHIPPED | OUTPATIENT
Start: 2019-07-16 | End: 2019-11-04 | Stop reason: SDUPTHER

## 2019-07-16 NOTE — TELEPHONE ENCOUNTER
----- Message from Bobo Mantilla sent at 7/16/2019 11:53 AM CDT -----  Contact: Gissel with [employer]  Type: Needs Medical Advice    Who Called:  Gissel with [facility]  Best Call Back Number: [phone]  Additional Information: Orders for home health included occupational therapy but medical necessity form did not include occupational therapy. Please advise is OT is needed

## 2019-07-16 NOTE — TELEPHONE ENCOUNTER
Home health aide is listed on the front page of [facility] but not listed in the actual HH order. Needs to be listed as a discipline if needed.     Frequency - they usually give nursing 4 visits, PT 6 and OT 2 and HH 2 visits in the beginning and they form a plan of care based on these visits and assessments. Is this agreeable?

## 2019-07-16 NOTE — TELEPHONE ENCOUNTER
----- Message from Erica Sarmiento NP sent at 7/14/2019  7:52 PM CDT -----  Please have this urine sample cultured.

## 2019-07-16 NOTE — TELEPHONE ENCOUNTER
----- Message from RT Sin sent at 7/16/2019 12:36 PM CDT -----  Contact: Jada,[phone] [facility]  Jada,[phone] [facility], requesting a call back soon to clarify the home health order in addition to the message that was left earlier, PT and OT, if the home health aide is needed, and the frequency,  thanks.

## 2019-07-18 ENCOUNTER — OFFICE VISIT (OUTPATIENT)
Dept: ORTHOPEDICS | Facility: CLINIC | Age: 84
End: 2019-07-18
Payer: MEDICARE

## 2019-07-18 ENCOUNTER — OFFICE VISIT (OUTPATIENT)
Dept: PULMONOLOGY | Facility: CLINIC | Age: 84
End: 2019-07-18
Payer: MEDICARE

## 2019-07-18 VITALS
SYSTOLIC BLOOD PRESSURE: 111 MMHG | OXYGEN SATURATION: 95 % | DIASTOLIC BLOOD PRESSURE: 53 MMHG | HEART RATE: 74 BPM | BODY MASS INDEX: 22.68 KG/M2 | WEIGHT: 128 LBS | HEIGHT: 63 IN

## 2019-07-18 VITALS
DIASTOLIC BLOOD PRESSURE: 56 MMHG | HEIGHT: 63 IN | WEIGHT: 128 LBS | SYSTOLIC BLOOD PRESSURE: 119 MMHG | BODY MASS INDEX: 22.68 KG/M2 | HEART RATE: 69 BPM

## 2019-07-18 DIAGNOSIS — J44.9 COPD MIXED TYPE: ICD-10-CM

## 2019-07-18 DIAGNOSIS — M54.32 LEFT SIDED SCIATICA: ICD-10-CM

## 2019-07-18 DIAGNOSIS — S72.002D CLOSED FRACTURE OF LEFT HIP WITH ROUTINE HEALING, SUBSEQUENT ENCOUNTER: Primary | ICD-10-CM

## 2019-07-18 DIAGNOSIS — J44.0 COPD (CHRONIC OBSTRUCTIVE PULMONARY DISEASE) WITH ACUTE BRONCHITIS: ICD-10-CM

## 2019-07-18 DIAGNOSIS — J82.83 EOSINOPHILIC ASTHMA: Primary | ICD-10-CM

## 2019-07-18 DIAGNOSIS — J44.89 ASTHMA-COPD OVERLAP SYNDROME: ICD-10-CM

## 2019-07-18 DIAGNOSIS — J20.9 COPD (CHRONIC OBSTRUCTIVE PULMONARY DISEASE) WITH ACUTE BRONCHITIS: ICD-10-CM

## 2019-07-18 PROCEDURE — 99999 PR PBB SHADOW E&M-EST. PATIENT-LVL III: CPT | Mod: PBBFAC,,, | Performed by: ORTHOPAEDIC SURGERY

## 2019-07-18 PROCEDURE — 99999 PR PBB SHADOW E&M-EST. PATIENT-LVL V: CPT | Mod: PBBFAC,,, | Performed by: INTERNAL MEDICINE

## 2019-07-18 PROCEDURE — 99213 OFFICE O/P EST LOW 20 MIN: CPT | Mod: S$GLB,,, | Performed by: INTERNAL MEDICINE

## 2019-07-18 PROCEDURE — 1101F PT FALLS ASSESS-DOCD LE1/YR: CPT | Mod: CPTII,S$GLB,, | Performed by: ORTHOPAEDIC SURGERY

## 2019-07-18 PROCEDURE — 99999 PR PBB SHADOW E&M-EST. PATIENT-LVL III: ICD-10-PCS | Mod: PBBFAC,,, | Performed by: ORTHOPAEDIC SURGERY

## 2019-07-18 PROCEDURE — 1101F PR PT FALLS ASSESS DOC 0-1 FALLS W/OUT INJ PAST YR: ICD-10-PCS | Mod: CPTII,S$GLB,, | Performed by: ORTHOPAEDIC SURGERY

## 2019-07-18 PROCEDURE — 99213 OFFICE O/P EST LOW 20 MIN: CPT | Mod: S$GLB,,, | Performed by: ORTHOPAEDIC SURGERY

## 2019-07-18 PROCEDURE — 1101F PT FALLS ASSESS-DOCD LE1/YR: CPT | Mod: CPTII,S$GLB,, | Performed by: INTERNAL MEDICINE

## 2019-07-18 PROCEDURE — 99999 PR PBB SHADOW E&M-EST. PATIENT-LVL V: ICD-10-PCS | Mod: PBBFAC,,, | Performed by: INTERNAL MEDICINE

## 2019-07-18 PROCEDURE — 99213 PR OFFICE/OUTPT VISIT, EST, LEVL III, 20-29 MIN: ICD-10-PCS | Mod: S$GLB,,, | Performed by: INTERNAL MEDICINE

## 2019-07-18 PROCEDURE — 1101F PR PT FALLS ASSESS DOC 0-1 FALLS W/OUT INJ PAST YR: ICD-10-PCS | Mod: CPTII,S$GLB,, | Performed by: INTERNAL MEDICINE

## 2019-07-18 PROCEDURE — 99213 PR OFFICE/OUTPT VISIT, EST, LEVL III, 20-29 MIN: ICD-10-PCS | Mod: S$GLB,,, | Performed by: ORTHOPAEDIC SURGERY

## 2019-07-18 RX ORDER — PREDNISONE 20 MG/1
TABLET ORAL
Qty: 15 TABLET | Refills: 0 | Status: SHIPPED | OUTPATIENT
Start: 2019-07-18 | End: 2019-10-22

## 2019-07-18 RX ORDER — BUDESONIDE 0.5 MG/2ML
0.5 INHALANT ORAL 2 TIMES DAILY
Qty: 120 ML | Refills: 5 | Status: SHIPPED | OUTPATIENT
Start: 2019-07-18 | End: 2019-12-10

## 2019-07-18 RX ORDER — METHYLPREDNISOLONE 4 MG/1
TABLET ORAL
Qty: 21 TABLET | Refills: 0 | Status: SHIPPED | OUTPATIENT
Start: 2019-07-18 | End: 2019-07-23

## 2019-07-18 NOTE — PROGRESS NOTES
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 8/7/2018    Hip arthritis 3/1/2016    Homocysteinemia 8/7/2018    Hyperlipidemia     Hypertension     Normocytic normochromic anemia 6/29/2018    PE (pulmonary thromboembolism) 06/09/2018    Pneumonia of right lower lobe due to infectious organism 9/11/2017    Thyroid disease     hypothyroid       Past Surgical History:   Procedure Laterality Date    APPENDECTOMY      CHOLECYSTECTOMY      ESOPHAGOGASTRODUODENOSCOPY (EGD) N/A 10/25/2017    [facility] ENDO    FRACTURE SURGERY      right hip     HERNIA REPAIR      groin    HYSTERECTOMY      Insertion, Implantable Loop Recorder N/A 12/12/2018    Performed by Asohk Herbert MD at [facility] CATH LAB    PINNING, HIP, PERCUTANEOUS Left 3/23/2019    Performed by [facility]je Hamlin MD at [facility] OR    VARICOSE VEIN SURGERY         Current Outpatient Medications   Medication Sig    acetaminophen (TYLENOL) 325 MG tablet Take 2 tablets (650 mg total) by mouth every 4 (four) hours as needed. (Patient taking differently: Take 650 mg by mouth every 4 (four) hours as needed for Pain. )    albuterol-ipratropium (DUO-NEB) 2.5 mg-0.5 mg/3 mL nebulizer solution USE ONE VIAL IN NEBULIZER EVERY 6 HOURS WHILE AWAKE (Patient taking differently: USE ONE VIAL IN NEBULIZER EVERY 6 HOURS WHILE AWAKE PRN SOB)    amLODIPine (NORVASC) 5 MG tablet Take 1 tablet (5 mg total) by mouth once daily.    ascorbic acid (VITAMIN C) 500  MG tablet Take 500 mg by mouth once daily.      aspirin (ECOTRIN) 81 MG EC tablet Take 81 mg by mouth once daily.      budesonide (PULMICORT) 0.5 mg/2 mL nebulizer solution Take 2 mLs (0.5 mg total) by nebulization 2 (two) times daily. Controller    calcium carbonate (OS-TASIA) 500 mg calcium (1,250 mg) tablet Take 1 tablet by mouth 2 (two) times daily.      ELIQUIS 5 mg Tab TAKE 1 TABLET BY MOUTH TWICE DAILY    ferrous sulfate (FEOSOL) 325 mg (65 mg iron) Tab tablet Take 1 tablet (325 mg total) by mouth 2 (two) times daily with meals.    fish oil-omega-3 fatty acids 300-1,000 mg capsule Take 2 g by mouth every evening.     fluticasone (FLONASE) 50 mcg/actuation nasal spray 2 sprays by Each Nare route once daily.    FOLBIC 2.5-25-2 mg Tab TAKE 1 TABLET BY MOUTH ONCE DAILY    furosemide (LASIX) 20 MG tablet Take 1 tablet on Tuesdays and Thursdays.    gabapentin (NEURONTIN) 300 MG capsule TAKE ONE CAPSULE BY MOUTH IN THE EVENING    JANUVIA 25 mg Tab TAKE 1 TABLET BY MOUTH ONCE DAILY    levothyroxine (SYNTHROID) 88 MCG tablet Take 1 tablet (88 mcg total) by mouth before breakfast.    magnesium oxide (MAG-OX) 400 mg tablet TAKE ONE TABLET BY MOUTH ONCE DAILY    multivitamin (THERAGRAN) tablet Take 1 tablet by mouth once daily.    mupirocin (BACTROBAN) 2 % ointment Apply topically 2 (two) times daily.    nitroGLYCERIN (NITROSTAT) 0.4 MG SL tablet Place 1 tablet (0.4 mg total) under the tongue every 5 (five) minutes as needed for Chest pain. As needed (Patient taking differently: Place 0.4 mg under the tongue every 5 (five) minutes as needed for Chest pain. Seek medical help if pain is not relieved after the third dose.)    predniSONE (DELTASONE) 20 MG tablet Take one daily for 3 days then repeat for bronchitis    senna-docusate 8.6-50 mg (PERICOLACE) 8.6-50 mg per tablet Take 1 tablet by mouth 2 (two) times daily.    simvastatin (ZOCOR) 40 MG tablet TAKE ONE TABLET BY MOUTH ONCE DAILY IN THE EVENING     sodium polystyrene (KAYEXALATE) powder TK 15 GM PO AS A SINGLE DOSE TODAY UTD    vitamin D 1000 units Tab Take 1 tablet (1,000 Units total) by mouth once daily.    methylPREDNISolone (MEDROL DOSEPACK) 4 mg tablet use as directed     No current facility-administered medications for this visit.        Review of patient's allergies indicates:   Allergen Reactions    Carvedilol Other (See Comments)     Bradycardia and syncope    Boniva [ibandronate]     Codeine     Hydralazine analogues     Iodinated contrast- oral and iv dye Hives    Kionex [sodium polystyrene sulfonate] Diarrhea     From encounter 17 for diarrhea--not true allergy.    Lisinopril     Morphine        Family History   Problem Relation Age of Onset    Hypertension Mother     Heart disease Mother     Lung disease Father     Diabetes Sister     Diabetes Brother     Kidney disease Son        Social History     Socioeconomic History    Marital status: Legally      Spouse name: Not on file    Number of children: Not on file    Years of education: Not on file    Highest education level: Not on file   Occupational History    Not on file   Social Needs    Financial resource strain: Not very hard    Food insecurity:     Worry: Never true     Inability: Never true    Transportation needs:     Medical: No     Non-medical: No   Tobacco Use    Smoking status: Former Smoker     Packs/day: 0.35     Years: 44.00     Pack years: 15.40     Types: Cigarettes     Last attempt to quit: 2015     Years since quittin.2    Smokeless tobacco: Never Used    Tobacco comment: 2015   Substance and Sexual Activity    Alcohol use: No     Alcohol/week: 0.0 oz     Frequency: Never     Binge frequency: Never    Drug use: No    Sexual activity: Never   Lifestyle    Physical activity:     Days per week: 0 days     Minutes per session: 0 min    Stress: Only a little   Relationships    Social connections:     Talks on phone: More  than three times a week     Gets together: More than three times a week     Attends [employer] service: Not on file     Active member of club or organization: No     Attends meetings of clubs or organizations: Never     Relationship status:    Other Topics Concern    Not on file   Social History Narrative    Not on file       Chief Complaint:   Chief Complaint   Patient presents with    Hip Pain     left hip pain       Date of surgery:  March 23, 2019    History of present illness:  88-year-old female who underwent left closed reduction percutaneous pinning of a femoral neck fracture 3 months ago.  Patient had a fall on July 14, 2019.  She was seen at the ER.  She had a CT scan done.  Patient had pretty extreme pain along the buttock area down the leg all the way to her foot even.  Pain was an 8/10.  Seems to be getting a little better.  CT scan was negative for new fracture.      Review of Systems:    Musculoskeletal:  See HPI        Physical Examination:    Vital Signs:    Vitals:    07/18/19 1124   BP: (!) 119/56   Pulse: 69       Body mass index is 22.67 kg/m².    This a well-developed, well nourished patient in no acute distress.  They are alert and oriented and cooperative to examination.  Pt.  Walks with a walker.    Examination left hip shows well-healed surgical incision. No erythema or drainage.  Minimal swelling.  Mild pain with range of motion of the hip.  Has some hypersensitivity all the way down to the foot.    X-rays:  X-rays left hip are  reviewed which show percutaneous pinning of the left femoral neck.  No change compared to the last x-ray.    CT scan of the left thigh:Old internally fixed left femoral fracture with no acute fracture.  If strong or continue clinical consideration for such MRI could be considered for further evaluation      Assessment::  Status post CRPP left femoral neck fracture  Left sciatic nerve contusion    Plan:  I reviewed the findings with her today.  Reviewed  the CT scan.  Continue weight-bearing as tolerated with an assistive device.  Recommended a Medrol Dosepak to see if we can calm down the sciatic nerve irritation.  Follow up in 3 weeks.  No new x-ray needed.    This note was created using M Mod[employer] voice recognition software that occasionally misinterpreted phrases or words.

## 2019-07-18 NOTE — PROGRESS NOTES
"7/18/2019    Blaire Cage       Chief Complaint   Patient presents with    COPD    Sputum Production     very light yellow       HPI:   July 18,2019-fell with hip fx - had rehab then fell again 2 wks ago.  Uses prednisone once or twice?  No advair as not covered.  Cannot recall singulair       1/8/2019- no prednisone recently, has cough with mucous yellow to clear now- lungs worsened 2 wks ago and saw NP [facility] and got celestone shot.    Discussed with patient above for education the following:      Get fluticasone - salmeterol inhaler and use 1 twice daily - if covered.  May prevent lungs from getting sick.    Use prednisone daily for 3 days if bronchitis flares.    If yellow mucous may need antibiotic.     July 2, 2018- had pe /dvt dx early June by v/q and u/s.  Took prednisone just once.      Discussed with patient above for education the following:      Use prednisone if needed. Lungs are doing well,     You had had foot swelling but no symptoms for 2 weeks- blood clot in leg went to lung.      Stay on blood thinners, usually 6 months minimum course, may be wise to stay on therapy?    Dec 20, 2017Smoked late life, no h/o asthma.  Lives alone with daughters /grandkids in and out.   No 02- off smokes a yr.  Has sinus problems chr with good flonase response.  Mucous is clear with no c/o excess cough and no wheezes.  Breathing felt to be good usually.  Uses resp rx bid.  No hosp for lungs- h/o  Fainting.  Has had very positive result from prednisone.  The chief compliant  problem is new to me",   PFSH:  Past Medical History:   Diagnosis Date    Anemia due to multiple mechanisms 6/29/2018    Anemia, chronic disease 6/29/2018    Anemia, chronic renal failure, stage 3 (moderate) 6/29/2018    Anticoagulant long-term use     aspirin    Aortic aneurysm     CHF (congestive heart failure)     COPD (chronic obstructive pulmonary disease)     COPD with acute exacerbation 1/9/2015    Diabetes mellitus type " II     DVT (deep venous thrombosis) 2018    Encounter for blood transfusion     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 2018    Hip arthritis 3/1/2016    Homocysteinemia 2018    Hyperlipidemia     Hypertension     Normocytic normochromic anemia 2018    PE (pulmonary thromboembolism) 2018    Pneumonia of right lower lobe due to infectious organism 2017    Thyroid disease     hypothyroid         Past Surgical History:   Procedure Laterality Date    APPENDECTOMY      CHOLECYSTECTOMY      ESOPHAGOGASTRODUODENOSCOPY (EGD) N/A 10/25/2017    Performed by [facility] Flores [facility] at [facility] ENDO    FRACTURE SURGERY      right hip     HERNIA REPAIR      groin    HYSTERECTOMY      Insertion, Implantable Loop Recorder N/A 2018    Performed by Ashok Herbert MD at [facility] CATH LAB    PINNING, HIP, PERCUTANEOUS Left 3/23/2019    [facility] by [facility]je Hamlin MD at [facility] OR    VARICOSE VEIN SURGERY       Social History     Tobacco Use    Smoking status: Former Smoker     Packs/day: 0.35     Years: 44.00     Pack years: 15.40     Types: Cigarettes     Last attempt to quit: 2015     Years since quittin.2    Smokeless tobacco: Never Used    Tobacco comment: 2015   Substance Use Topics    Alcohol use: No     Alcohol/week: 0.0 oz     Frequency: Never     Binge frequency: Never    Drug use: No     Family History   Problem Relation Age of Onset    Hypertension Mother     Heart disease Mother     Lung disease Father     Diabetes Sister     Diabetes Brother     Kidney disease Son      Review of patient's allergies indicates:   Allergen Reactions    Carvedilol Other (See Comments)     Bradycardia and syncope    Boniva [ibandronate]     Codeine     Hydralazine analogues     Iodinated contrast- oral and iv dye Hives    Kionex [sodium polystyrene sulfonate]     Lisinopril     Morphine        Performance Status:The patient's activity level is functions out of house.   "    Review of Systems:  a review of eleven systems covering constitutional, Eye, HEENT, Psych, Respiratory, Cardiac, GI, , Musculoskeletal, Endocrine, Dermatologic was negative except for pertinent findings as listed ABOVE and below: all good save above, was on dm rx, on bone rx      Exam:Comprehensive exam done. BP (!) 111/53 (BP Location: Left arm, Patient Position: Sitting)   Pulse 74   Ht 5' 3" (1.6 m)   Wt 58.1 kg (128 lb)   LMP  (LMP Unknown)   SpO2 95% Comment: on room air  BMI 22.67 kg/m²   Exam included Vitals as listed, and patient's appearance and affect and alertness and mood, oral exam for yeast and hygiene and pharynx lesions and Mallapatti (M) score, neck with inspection for jvd and masses and thyroid abnormalities and lymph nodes (supraclavicular and infraclavicular nodes and axillary also examined and noted if abn), chest exam included symmetry and effort and fremitus and percussion and auscultation, cardiac exam included rhythm and gallops and murmur and rubs and jvd and edema, abdominal exam for mass and hepatosplenomegaly and tenderness and hernias and bowel sounds, Musculoskeletal exam with muscle tone and posture and mobility/gait and  strength, and skin for rashes and cyanosis and pallor and turgor, extremity for clubbing.  Findings were normal except for pertinent findings listed below:  M1, slender, nl pharynx, no neck abn, symmetric chest, nl fremitus/percussion, good bs, RRR with no  Murmur or gallop or rub, no jvd nor edema, noclubbing, alert and moves all 4, euthymic    Radiographs (ct chest and cxr) reviewed: results reviewed  - ct chest viewed and nad. .lung scan June indeterminate.  Pos leg study early June 2018-  Impression       This represents an intermediate probability V/Q scan for the presence of pulmonary embolism.  Consider additional evaluation with lower extremity venous ultrasound or CTA chest.      Electronically signed by: Aubrey Davis MD  Date: " 06/09/2018  Time: 09:08     Impression       1. Interval development of enlargement of the left popliteal vein and nonocclusive peripheral thrombus within the left popliteal vein focally.  2. Complete thrombosis of a superficial venous varicosity in the left popliteal fossa.  3. Venous reflux within the left popliteal vein is a result of an incompetent venous valve.  This report was flagged in Epic as abnormal.      Electronically signed by: Aubrey Davis MD  Date: 06/09/2018  Time: 15:47       Labs none available      eos have been up to 400!  PFT results reviewed  10/30/2017- fev 52% 0.82 with 12% bd response.    Plan:  Clinical impression is apparently straight forward and impression with management as below.    Blaire was seen today for copd and sputum production.    Diagnoses and all orders for this visit:    Eosinophilic asthma  -     budesonide (PULMICORT) 0.5 mg/2 mL nebulizer solution; Take 2 mLs (0.5 mg total) by nebulization 2 (two) times daily. Controller  -     predniSONE (DELTASONE) 20 MG tablet; Take one daily for 3 days then repeat for bronchitis    COPD mixed type    Asthma-COPD overlap syndrome  -     budesonide (PULMICORT) 0.5 mg/2 mL nebulizer solution; Take 2 mLs (0.5 mg total) by nebulization 2 (two) times daily. Controller  -     predniSONE (DELTASONE) 20 MG tablet; Take one daily for 3 days then repeat for bronchitis    COPD (chronic obstructive pulmonary disease) with acute bronchitis  -     predniSONE (DELTASONE) 20 MG tablet; Take one daily for 3 days then repeat for bronchitis        Follow up in about 6 months (around 1/18/2020).    Discussed with patient above for education the following:          Discussed with patient above for education the following:      Patient Instructions   Use budesonide in nebulizer if covered. Use twice daily (once better than none- will prevent but not improve).      May need prednisone to keep lungs stable, should be better with regular use budesonide.

## 2019-07-18 NOTE — PATIENT INSTRUCTIONS
Use budesonide in nebulizer if covered. Use twice daily (once better than none- will prevent but not improve).      May need prednisone to keep lungs stable, should be better with regular use budesonide.

## 2019-07-18 NOTE — LETTER
July 18, 2019      Erica Sarmiento, NP  [address]  [place] [address]           [address]  [facility] [address]  Phone: [phone]          Patient: Blaier Cage   MR Number: [id number]   YOB: 1930   Date of Visit: 7/18/2019       Dear Erica Sarmiento:    Thank you for referring Blaire Cage to me for evaluation. Attached you will find relevant portions of my assessment and plan of care.    If you have questions, please do not hesitate to call me. I look forward to following Blaire Cage along with you.    Sincerely,    Jorje Hamlin MD    Enclosure  CC:  No Recipients    If you would like to receive this communication electronically, please contact [email or web address] or [phone] to request more information on [facility] access.    For providers and/or their staff who would like to refer a patient to [facility], please contact us through our one-stop-shop provider referral line, [facility], at [phone].    If you feel you have received this communication in error or would no longer like to receive these types of communications, please e-mail [email or web address]

## 2019-07-19 PROCEDURE — G0180 MD CERTIFICATION HHA PATIENT: HCPCS | Mod: ,,, | Performed by: FAMILY MEDICINE

## 2019-07-19 PROCEDURE — G0180 PR HOME HEALTH MD CERTIFICATION: ICD-10-PCS | Mod: ,,, | Performed by: FAMILY MEDICINE

## 2019-07-22 ENCOUNTER — TELEPHONE (OUTPATIENT)
Dept: FAMILY MEDICINE | Facility: CLINIC | Age: 84
End: 2019-07-22

## 2019-07-22 NOTE — TELEPHONE ENCOUNTER
I spoke to the patient's son who stated the patient has been complaining of pelvic pain for the last 3 days. Patient's son states that the patient was taking a lot of hydrocodone so he and his sisters cut her down to half a pill and then took it away completely. He stated the patient's disposition changed completely and she has been complaining of pain if she does not get pain medication or cigarettes since she has recently started smoking again. Patient's son will be attending the appointment tomorrow with GABRIELA Sarmiento to discuss his concerns.

## 2019-07-22 NOTE — TELEPHONE ENCOUNTER
----- Message from Ainsley Mitchell sent at 7/22/2019 10:11 AM CDT -----  Contact: Aniceto son  Type: Needs Medical Advice    Who Called:  Aniceto  Symptoms (please be specific):  Pt having pain in her private areas/had surgery a while back  How long has patient had these symptoms:  3 days  Pharmacy name and phone #:    [id number] - [place], [place] - [address]  [facility] [address]  Phone: [phone] Fax: [phone]      Best Call Back Number: [phone]  Additional Information: Pls call Aniceto to adv

## 2019-07-23 ENCOUNTER — OFFICE VISIT (OUTPATIENT)
Dept: FAMILY MEDICINE | Facility: CLINIC | Age: 84
End: 2019-07-23
Payer: MEDICARE

## 2019-07-23 VITALS
WEIGHT: 128.06 LBS | SYSTOLIC BLOOD PRESSURE: 138 MMHG | OXYGEN SATURATION: 97 % | BODY MASS INDEX: 22.69 KG/M2 | HEIGHT: 63 IN | TEMPERATURE: 98 F | DIASTOLIC BLOOD PRESSURE: 62 MMHG | HEART RATE: 69 BPM

## 2019-07-23 DIAGNOSIS — M25.552 HIP PAIN, LEFT: ICD-10-CM

## 2019-07-23 DIAGNOSIS — R10.32 LEFT GROIN PAIN: Primary | ICD-10-CM

## 2019-07-23 DIAGNOSIS — K59.00 CONSTIPATION, UNSPECIFIED CONSTIPATION TYPE: ICD-10-CM

## 2019-07-23 DIAGNOSIS — F17.200 TOBACCO DEPENDENCE: ICD-10-CM

## 2019-07-23 PROCEDURE — 1100F PR PT FALLS ASSESS DOC 2+ FALLS/FALL W/INJURY/YR: ICD-10-PCS | Mod: CPTII,S$GLB,, | Performed by: NURSE PRACTITIONER

## 2019-07-23 PROCEDURE — 99213 OFFICE O/P EST LOW 20 MIN: CPT | Mod: S$GLB,,, | Performed by: NURSE PRACTITIONER

## 2019-07-23 PROCEDURE — 99999 PR PBB SHADOW E&M-EST. PATIENT-LVL IV: ICD-10-PCS | Mod: PBBFAC,,, | Performed by: NURSE PRACTITIONER

## 2019-07-23 PROCEDURE — 3288F PR FALLS RISK ASSESSMENT DOCUMENTED: ICD-10-PCS | Mod: CPTII,S$GLB,, | Performed by: NURSE PRACTITIONER

## 2019-07-23 PROCEDURE — 99213 PR OFFICE/OUTPT VISIT, EST, LEVL III, 20-29 MIN: ICD-10-PCS | Mod: S$GLB,,, | Performed by: NURSE PRACTITIONER

## 2019-07-23 PROCEDURE — 1100F PTFALLS ASSESS-DOCD GE2>/YR: CPT | Mod: CPTII,S$GLB,, | Performed by: NURSE PRACTITIONER

## 2019-07-23 PROCEDURE — 3288F FALL RISK ASSESSMENT DOCD: CPT | Mod: CPTII,S$GLB,, | Performed by: NURSE PRACTITIONER

## 2019-07-23 PROCEDURE — 99999 PR PBB SHADOW E&M-EST. PATIENT-LVL IV: CPT | Mod: PBBFAC,,, | Performed by: NURSE PRACTITIONER

## 2019-07-24 NOTE — PROGRESS NOTES
"Subjective:       Patient ID: Blaire Cage is a 89 y.o. female.    Chief Complaint: Pelvic Pain    Ms. Neri presents today with her son. She is unsure why she is here, states "he made the appointment". He states until 2 days ago, she was complaining of left groin pain. This has since resolved with the use of topical diclofenac. The patient reports that she has had this pain on and off "for years." it is not worse. Denies pain today. Previous had a similar pain of the right groin, but she had a procedure that resolved this. She cannot remember any of the details. Pain does not limit her mobility.  nurse was concerned with a hernia. Her son also reports that she is smoking. Will often forget she is around her home oxygen. Ms. Cage reports she is smoking only a few cigarettes a day. She is doing well. No falls since her last visit. Feels like home PT is "pushing" her. Vitals stable. Ambulating independently with walker.     Patient Active Problem List   Diagnosis    Diabetic neuropathy, type II diabetes mellitus    CKD (chronic kidney disease), stage III, eGFR 39, progressive 31    Hypothyroidism    Hypertension associated with diabetes    Hyperlipidemia associated with type 2 diabetes mellitus    Type 2 diabetes mellitus with stage 3 chronic kidney disease    Right carotid bruit    COPD mixed type    Anxiety    Tobacco dependence    Abdominal aortic aneurysm without rupture    Hip arthritis    Degenerative spinal arthritis    Osteoporosis    Left ventricular diastolic dysfunction with preserved systolic function    Hypertensive left ventricular hypertrophy, without heart failure    Smoker, quit 5/2016, 25 pck-years    Abdominal obesity    Absolute anemia    Body mass index (BMI) 23.0-23.9, adult, today 24.1    Proteinuria due to type 2 diabetes mellitus    History of syncope in childhood    Prerenal azotemia    Drug-induced constipation    COPD with acute exacerbation    " Asthmatic bronchitis with acute exacerbation    Shortness of breath    Controlled type 2 diabetes mellitus, without long-term current use of insulin    Acute pulmonary embolism    SOB (shortness of breath)    Asthma-COPD overlap syndrome    Eosinophilic asthma    Moderate malnutrition    Debility    Type 2 diabetes mellitus with kidney complication, without long-term current use of insulin    Chronic anticoagulation    Constipation    DVT (deep venous thrombosis)    History of pulmonary embolism    Anemia due to multiple mechanisms    Anemia, chronic disease    Normocytic normochromic anemia    Anemia, chronic renal failure, stage 3 (moderate)    Homocysteinemia    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   HENT: Negative for hearing loss, rhinorrhea and trouble swallowing.    Eyes: Negative for discharge and visual disturbance.   Respiratory: Negative for chest tightness and wheezing.    Cardiovascular: Negative for chest pain and palpitations.   Gastrointestinal: Positive for constipation. Negative for blood in stool, diarrhea and vomiting.   Endocrine: Negative for polydipsia and polyuria.   Genitourinary: Negative for difficulty urinating, dysuria, hematuria and menstrual problem.   Musculoskeletal: Positive for arthralgias. Negative for joint swelling and neck pain.   Neurological: Negative for weakness and headaches.   Psychiatric/Behavioral: Negative for confusion and dysphoric mood.       Objective:      Physical Exam   Constitutional: She is oriented to person, place, and time. She appears well-developed and well-nourished.   Cardiovascular: Normal rate, regular rhythm and normal heart  sounds.   Pulmonary/Chest: Effort normal and breath sounds normal.   Abdominal: Soft. There is no tenderness. No hernia.       Musculoskeletal: She exhibits no edema.   Ambulating well with walker   Neurological: She is alert and oriented to person, place, and time.   Skin: Skin is warm and dry.   Psychiatric: She has a normal mood and affect.   Nursing note and vitals reviewed.      Assessment:       1. Left groin pain    2. Tobacco dependence    3. Hip pain, left    4. Constipation, unspecified constipation type        Plan:       Blaire was seen today for pelvic pain.    Diagnoses and all orders for this visit:    Left groin pain  -     US Abdomen Limited_Hernia; Future  Screen for hernia    Tobacco dependence  Encouraged smoking cessation  Educate to avoid smoking near oxygen, can become flammable     Hip pain, left  Ortho following  Working with PT with home health  Continue use of assistive device    Constipation, unspecified constipation type  Resolved with stool softeners      F/U as scheduled to re-evaluate pain

## 2019-07-29 ENCOUNTER — HOSPITAL ENCOUNTER (OUTPATIENT)
Dept: RADIOLOGY | Facility: CLINIC | Age: 84
Discharge: HOME | End: 2019-07-29
Attending: NURSE PRACTITIONER
Payer: MEDICARE

## 2019-07-29 DIAGNOSIS — R10.32 LEFT GROIN PAIN: ICD-10-CM

## 2019-07-29 PROCEDURE — 76705 ECHO EXAM OF ABDOMEN: CPT | Mod: 26,,, | Performed by: RADIOLOGY

## 2019-07-29 PROCEDURE — 76705 ECHO EXAM OF ABDOMEN: CPT | Mod: TC,PO

## 2019-07-29 PROCEDURE — 76705 US ABDOMEN LIMITED_HERNIA: ICD-10-PCS | Mod: 26,,, | Performed by: RADIOLOGY

## 2019-07-31 RX ORDER — SIMVASTATIN 40 MG/1
TABLET, FILM COATED ORAL
Qty: 90 TABLET | Refills: 0 | Status: SHIPPED | OUTPATIENT
Start: 2019-07-31 | End: 2019-09-26 | Stop reason: SDUPTHER

## 2019-07-31 NOTE — TELEPHONE ENCOUNTER
Left message for patient's son to return call.   Patient needs to be seen to review meds, update labs and discuss Januvia alternatives per NP Lemon.

## 2019-07-31 NOTE — TELEPHONE ENCOUNTER
Patient's son states the Januvia is $100 for 30 day supply. Patient's son asks if there is a way she can come off it or if there is an alternative that may be cheaper.

## 2019-07-31 NOTE — TELEPHONE ENCOUNTER
Patient's son Aniceto states the patient has an appt with GABRIELA Sarmiento on 8/6 and will discuss with her at that appt.

## 2019-07-31 NOTE — TELEPHONE ENCOUNTER
----- Message from RT Sin sent at 7/31/2019  1:46 PM CDT -----  Contact: Aniceto,Son,[phone]   Aniceto,Son,[phone], requesting a call back soon concerning the pt's medication, thanks.

## 2019-08-02 ENCOUNTER — EXTERNAL HOME HEALTH (OUTPATIENT)
Dept: HOME HEALTH SERVICES | Facility: HOSPITAL | Age: 84
End: 2019-08-02
Payer: MEDICARE

## 2019-08-04 NOTE — PROGRESS NOTES
[facility] Hematology/Oncology  PROGRESS NOTE        Subjective:       Patient ID:   NAME: Blaire Cage : 1930     89 y.o. female    Referring Doc: Sandro  Other Physicians: Cande Garg (PA); Eli Edmonds (PCP); Jeffrey Christopher (Pulm); Sandra (GI)    Chief Complaint:  DVT and PE f/u    History of Present Illness:     Patient returns today for a  regularly scheduled follow-up visit.  The patient is here today to go over the results of the recently ordered labs, tests and studies. She is here with her daughter. She is breathing ok. She denies any swelling in the legs. She denies any CP, SOB, HA's.  She is walking with use of walker. She still has some chronic stomach issues and is followed by Dr Flores with GI.       ROS:   GEN: normal without any fever, night sweats or weight loss  HEENT: normal with no HA's, sore throat, stiff neck, changes in vision  CV: normal with no CP, SOB, PND, WEST or orthopnea  PULM: normal with no SOB, cough, hemoptysis, sputum or pleuritic pain  GI: some recent N/V, and diarrhea  : normal with no hematuria, dysuria  BREAST: normal with no mass, discharge, pain  SKIN: normal with no rash, erythema, bruising, or swelling    Allergies:  Review of patient's allergies indicates:   Allergen Reactions    Carvedilol Other (See Comments)     Bradycardia and syncope    Boniva [ibandronate]     Codeine     Hydralazine analogues     Iodinated contrast- oral and iv dye Hives    Kionex [sodium polystyrene sulfonate] Diarrhea     From encounter 17 for diarrhea--not true allergy.    Lisinopril     Morphine        Medications:    Current Outpatient Medications:     acetaminophen (TYLENOL) 325 MG tablet, Take 2 tablets (650 mg total) by mouth every 4 (four) hours as needed. (Patient taking differently: Take 650 mg by mouth every 4 (four) hours as needed for Pain. ), Disp: , Rfl: 0    albuterol-ipratropium (DUO-NEB) 2.5 mg-0.5 mg/3 mL nebulizer solution, USE ONE VIAL IN NEBULIZER EVERY 6  HOURS WHILE AWAKE (Patient taking differently: USE ONE VIAL IN NEBULIZER EVERY 6 HOURS WHILE AWAKE PRN SOB), Disp: 120 vial, Rfl: 5    amLODIPine (NORVASC) 5 MG tablet, Take 1 tablet (5 mg total) by mouth once daily., Disp: 90 tablet, Rfl: 3    ascorbic acid (VITAMIN C) 500 MG tablet, Take 500 mg by mouth once daily.  , Disp: , Rfl:     aspirin (ECOTRIN) 81 MG EC tablet, Take 81 mg by mouth once daily.  , Disp: , Rfl:     budesonide (PULMICORT) 0.5 mg/2 mL nebulizer solution, Take 2 mLs (0.5 mg total) by nebulization 2 (two) times daily. Controller, Disp: 120 mL, Rfl: 5    calcium carbonate (OS-TASIA) 500 mg calcium (1,250 mg) tablet, Take 1 tablet by mouth 2 (two) times daily.  , Disp: , Rfl:     ELIQUIS 5 mg Tab, TAKE 1 TABLET BY MOUTH TWICE DAILY, Disp: 60 tablet, Rfl: 5    ferrous sulfate (FEOSOL) 325 mg (65 mg iron) Tab tablet, Take 1 tablet (325 mg total) by mouth 2 (two) times daily with meals., Disp: 180 tablet, Rfl: 3    fish oil-omega-3 fatty acids 300-1,000 mg capsule, Take 2 g by mouth every evening. , Disp: , Rfl:     fluticasone (FLONASE) 50 mcg/actuation nasal spray, 2 sprays by Each Nare route once daily., Disp: 48 g, Rfl: 3    FOLBIC 2.5-25-2 mg Tab, TAKE 1 TABLET BY MOUTH ONCE DAILY, Disp: 30 tablet, Rfl: 6    furosemide (LASIX) 20 MG tablet, Take 1 tablet on Tuesdays and Thursdays., Disp: 24 tablet, Rfl: 1    gabapentin (NEURONTIN) 300 MG capsule, TAKE ONE CAPSULE BY MOUTH IN THE EVENING, Disp: 90 capsule, Rfl: 3    JANUVIA 25 mg Tab, TAKE 1 TABLET BY MOUTH ONCE DAILY, Disp: 30 tablet, Rfl: 5    levothyroxine (SYNTHROID) 88 MCG tablet, Take 1 tablet (88 mcg total) by mouth before breakfast., Disp: 90 tablet, Rfl: 3    magnesium oxide (MAG-OX) 400 mg tablet, TAKE ONE TABLET BY MOUTH ONCE DAILY, Disp: 90 tablet, Rfl: 3    multivitamin (THERAGRAN) tablet, Take 1 tablet by mouth once daily., Disp: , Rfl:     mupirocin (BACTROBAN) 2 % ointment, Apply topically 2 (two) times daily., Disp:  30 g, Rfl: 1    nitroGLYCERIN (NITROSTAT) 0.4 MG SL tablet, Place 1 tablet (0.4 mg total) under the tongue every 5 (five) minutes as needed for Chest pain. As needed (Patient taking differently: Place 0.4 mg under the tongue every 5 (five) minutes as needed for Chest pain. Seek medical help if pain is not relieved after the third dose.), Disp: 30 tablet, Rfl: 3    predniSONE (DELTASONE) 20 MG tablet, Take one daily for 3 days then repeat for bronchitis, Disp: 15 tablet, Rfl: 0    senna-docusate 8.6-50 mg (PERICOLACE) 8.6-50 mg per tablet, Take 1 tablet by mouth 2 (two) times daily., Disp: , Rfl:     simvastatin (ZOCOR) 40 MG tablet, TAKE ONE TABLET BY MOUTH ONCE DAILY IN THE EVENING, Disp: 90 tablet, Rfl: 0    sodium polystyrene (KAYEXALATE) powder, TK 15 GM PO AS A SINGLE DOSE TODAY UTD, Disp: , Rfl: 0    vitamin D 1000 units Tab, Take 1 tablet (1,000 Units total) by mouth once daily., Disp: 90 tablet, Rfl: 3    PMHx/PSHx Updates:  See patient's last visit with me on 6/5/2019.  See H&P on 6/29/2018        Pathology:  Cancer Staging  No matching staging information was found for the patient.          Objective:     Vitals:  Blood pressure (!) 148/65, pulse 66, temperature 98.5 °F (36.9 °C), temperature source Oral, resp. rate 20, weight 56.7 kg (125 lb).    Physical Examination:   GEN: no apparent distress, comfortable; AAOx3  HEAD: atraumatic and normocephalic  EYES: no pallor, no icterus, PERRLA  ENT: OMM, no pharyngeal erythema, external ears WNL; no nasal discharge; no thrush  NECK: no masses, thyroid normal, trachea midline, no LAD/LN's, supple  CV: RRR with no murmur; normal pulse; normal S1 and S2; no pedal edema  CHEST: Normal respiratory effort; CTAB; normal breath sounds; no wheeze or crackles  ABDOM: nontender and nondistended; soft; normal bowel sounds; no rebound/guarding  MUSC/Skeletal: ROM normal; no crepitus; joints normal; no deformities or arthropathy; no longer on walker  EXTREM: no  clubbing, cyanosis, inflammation or swelling  SKIN: no rashes, lesions, ulcers, petechiae or subcutaneous nodules; vitiligo   : no carter  NEURO: grossly intact; motor/sensory WNL; AAOx3; no tremors  PSYCH: normal mood, affect and behavior  LYMPH: normal cervical, supraclavicular, axillary and groin LN's             Labs:     7/2/2019    Lab Results   Component Value Date    WBC 8.19 07/02/2019    HGB 10.4 (L) 07/02/2019    HCT 31.1 (L) 07/02/2019    MCV 92 07/02/2019     07/02/2019            BMP  Lab Results   Component Value Date     (L) 07/02/2019    K 4.5 07/02/2019    CL 97 07/02/2019    CO2 25 07/02/2019    BUN 35 (H) 07/02/2019    CREATININE 1.5 (H) 07/02/2019    CALCIUM 9.5 07/02/2019    ANIONGAP 9 07/02/2019    ESTGFRAFRICA 36 (A) 07/02/2019    EGFRNONAA 31 (A) 07/02/2019       Lab Results   Component Value Date    IRON 64 06/03/2019    TIBC 306 06/03/2019    FERRITIN 205 06/03/2019     Lab Results   Component Value Date    LXPVRBVE01 >2000 (H) 06/03/2019     Lab Results   Component Value Date    FOLATE >40.0 (H) 06/03/2019           Radiology/Diagnostic Studies:    Ct Head Without Contrast    Result Date: 7/16/2018  EXAMINATION: CT HEAD WITHOUT CONTRAST CLINICAL HISTORY: Dizziness; TECHNIQUE: 5 mm noncontrast axial images were acquired through the head. COMPARISON: CT head without contrast-11/29/2014 FINDINGS: The brain is normally formed with preserved gray-white matter junction differentiation. No evidence of acute/recent major vascular territory cerebral infarction, parenchymal hemorrhage, or intra-axial mass.  There is age-appropriate cerebral volume loss with associated compensatory enlargement of the ventricular system and widening of the CSF spaces over both cerebral convexities.  There are confluent areas of periventricular white matter hypoattenuation compatible with age-appropriate chronic microvascular ischemic changes. No hydrocephalus.  No effacement of the skull-base cisterns.   No extra-axial fluid collections or blood products. The paranasal sinuses and mastoid air cells are clear.  There is rightward bony nasal septal deviation.  The visualized orbits are unremarkable.  The bony calvarium and visualized facial bones show no acute abnormality.     No acute intracranial abnormality appreciated.  No interval detrimental change in the imaging appearance of the brain when compared to the previous study. Electronically signed by: Aubrey Davis MD Date:    07/16/2018 Time:    08:01    Radiology Report    Result Date: 7/15/2018  Ordered by an unspecified provider.      I have reviewed all available lab results and radiology reports.    Assessment/Plan:   (1) 89 y.o. female with diagnosis of a recent LLE DVT and Pulmonary emboli who has been referred by Dr Jo with Neph for evaluation by medical hematology/oncology. She was hospitalized [facility]. She has never had any clots before. No family hx/of clots.    - factor panel, protein C and S, ATIII adequate  - antiphosphatidyl negative; LA negative  - homocysteine elevated at 20.2  - MTHFR-C heterozygous positive - discussed the genetic implications with her and her daughter  - prothrombin II negative  - she is on eliquis bid      (2) COPD/asthma; former smoker     (3) Left ventricular dysfunction     (4) HTN and hypercholesterolemia     (5) CRI - stage III     (6) Anemia - most likely a multifactorial process with iron deficiency diagnosis, anemia of chronic disorders, anemia of chronic renal  - latest hgb is lower at  10.4 and  better  - she is on iron and MVI  - iron panel is adequate    (7) DM and hypothyroidism    (8) Recent hip fracture - left - needed pin placement only            Deep vein thrombosis (DVT) of proximal vein of left lower extremity, unspecified chronicity    Chronic anticoagulation    Other acute pulmonary embolism without acute cor pulmonale    Anemia, unspecified type    Anemia of chronic disease    Anemia due to multiple  mechanisms    History of pulmonary embolism    Anemia, chronic renal failure, stage 3 (moderate)    Anemia, chronic disease    Macrocytic anemia    Normocytic normochromic anemia    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 Continue Folbic for the hyperhomocysteinemia  2. Continue eliquis and consider lifelong usage  3. Previously  discussed the genetic implications of the MTHFR-C in siblings and children  4. F/u with PCP, GI etc  6. Check labs monthly for now (encouraged compliance)    RTC in  3-4 months    Fax note to Eli Edmonds MD; Reva Jo    Discussion:       I spent over 25 mins of time with the patient. Reviewed results of the recently ordered labs, tests and studies; made directives with regards to the results. Over half of this time was spent couseling and coordinating care.    I have explained all of the above in detail and the patient understands all of the current recommendation(s). I have answered all of their questions to the best of my ability and to their complete satisfaction.   The patient is to continue with the current management plan.            Electronically signed by Issac Alvarez MD

## 2019-08-05 ENCOUNTER — OFFICE VISIT (OUTPATIENT)
Dept: HEMATOLOGY/ONCOLOGY | Facility: CLINIC | Age: 84
End: 2019-08-05
Payer: MEDICARE

## 2019-08-05 VITALS
BODY MASS INDEX: 22.14 KG/M2 | WEIGHT: 125 LBS | HEART RATE: 66 BPM | RESPIRATION RATE: 20 BRPM | SYSTOLIC BLOOD PRESSURE: 148 MMHG | DIASTOLIC BLOOD PRESSURE: 65 MMHG | TEMPERATURE: 99 F

## 2019-08-05 DIAGNOSIS — I82.4Y2 DEEP VEIN THROMBOSIS (DVT) OF PROXIMAL VEIN OF LEFT LOWER EXTREMITY, UNSPECIFIED CHRONICITY: Primary | ICD-10-CM

## 2019-08-05 DIAGNOSIS — Z79.01 CHRONIC ANTICOAGULATION: ICD-10-CM

## 2019-08-05 DIAGNOSIS — R79.83 HOMOCYSTEINEMIA: ICD-10-CM

## 2019-08-05 DIAGNOSIS — I26.99 OTHER ACUTE PULMONARY EMBOLISM WITHOUT ACUTE COR PULMONALE: ICD-10-CM

## 2019-08-05 DIAGNOSIS — D64.89 ANEMIA DUE TO MULTIPLE MECHANISMS: ICD-10-CM

## 2019-08-05 DIAGNOSIS — D63.8 ANEMIA, CHRONIC DISEASE: ICD-10-CM

## 2019-08-05 DIAGNOSIS — Z86.711 HISTORY OF PULMONARY EMBOLISM: ICD-10-CM

## 2019-08-05 DIAGNOSIS — D64.9 NORMOCYTIC NORMOCHROMIC ANEMIA: ICD-10-CM

## 2019-08-05 DIAGNOSIS — D63.1 ANEMIA, CHRONIC RENAL FAILURE, STAGE 3 (MODERATE): ICD-10-CM

## 2019-08-05 DIAGNOSIS — F17.200 SMOKER: ICD-10-CM

## 2019-08-05 DIAGNOSIS — D53.9 MACROCYTIC ANEMIA: ICD-10-CM

## 2019-08-05 DIAGNOSIS — D63.8 ANEMIA OF CHRONIC DISEASE: ICD-10-CM

## 2019-08-05 DIAGNOSIS — N18.30 ANEMIA, CHRONIC RENAL FAILURE, STAGE 3 (MODERATE): ICD-10-CM

## 2019-08-05 DIAGNOSIS — D64.9 ANEMIA, UNSPECIFIED TYPE: ICD-10-CM

## 2019-08-05 DIAGNOSIS — Z15.89 HETEROZYGOUS MTHFR MUTATION C677T: ICD-10-CM

## 2019-08-05 PROCEDURE — 99213 PR OFFICE/OUTPT VISIT, EST, LEVL III, 20-29 MIN: ICD-10-PCS | Mod: S$GLB,,, | Performed by: INTERNAL MEDICINE

## 2019-08-05 PROCEDURE — 1101F PT FALLS ASSESS-DOCD LE1/YR: CPT | Mod: S$GLB,,, | Performed by: INTERNAL MEDICINE

## 2019-08-05 PROCEDURE — 1101F PR PT FALLS ASSESS DOC 0-1 FALLS W/OUT INJ PAST YR: ICD-10-PCS | Mod: S$GLB,,, | Performed by: INTERNAL MEDICINE

## 2019-08-05 PROCEDURE — 99213 OFFICE O/P EST LOW 20 MIN: CPT | Mod: S$GLB,,, | Performed by: INTERNAL MEDICINE

## 2019-08-06 ENCOUNTER — LAB VISIT (OUTPATIENT)
Dept: LAB | Facility: HOSPITAL | Age: 84
End: 2019-08-06
Attending: INTERNAL MEDICINE
Payer: MEDICARE

## 2019-08-06 ENCOUNTER — OFFICE VISIT (OUTPATIENT)
Dept: FAMILY MEDICINE | Facility: CLINIC | Age: 84
End: 2019-08-06
Payer: MEDICARE

## 2019-08-06 VITALS
HEART RATE: 69 BPM | WEIGHT: 124.31 LBS | DIASTOLIC BLOOD PRESSURE: 58 MMHG | TEMPERATURE: 98 F | HEIGHT: 63 IN | BODY MASS INDEX: 22.03 KG/M2 | OXYGEN SATURATION: 95 % | SYSTOLIC BLOOD PRESSURE: 122 MMHG

## 2019-08-06 DIAGNOSIS — J44.89 ASTHMA-COPD OVERLAP SYNDROME: ICD-10-CM

## 2019-08-06 DIAGNOSIS — Z86.711 HISTORY OF PULMONARY EMBOLISM: ICD-10-CM

## 2019-08-06 DIAGNOSIS — I26.99 OTHER ACUTE PULMONARY EMBOLISM WITHOUT ACUTE COR PULMONALE: ICD-10-CM

## 2019-08-06 DIAGNOSIS — D64.89 ANEMIA DUE TO MULTIPLE MECHANISMS: ICD-10-CM

## 2019-08-06 DIAGNOSIS — D64.9 ANEMIA, UNSPECIFIED TYPE: ICD-10-CM

## 2019-08-06 DIAGNOSIS — D63.1 ANEMIA, CHRONIC RENAL FAILURE, STAGE 3 (MODERATE): ICD-10-CM

## 2019-08-06 DIAGNOSIS — D53.9 MACROCYTIC ANEMIA: ICD-10-CM

## 2019-08-06 DIAGNOSIS — F17.200 TOBACCO DEPENDENCE: ICD-10-CM

## 2019-08-06 DIAGNOSIS — D50.0 IRON DEFICIENCY ANEMIA DUE TO CHRONIC BLOOD LOSS: ICD-10-CM

## 2019-08-06 DIAGNOSIS — K59.00 CONSTIPATION, UNSPECIFIED CONSTIPATION TYPE: ICD-10-CM

## 2019-08-06 DIAGNOSIS — D64.9 NORMOCYTIC NORMOCHROMIC ANEMIA: ICD-10-CM

## 2019-08-06 DIAGNOSIS — N18.30 ANEMIA, CHRONIC RENAL FAILURE, STAGE 3 (MODERATE): ICD-10-CM

## 2019-08-06 DIAGNOSIS — E11.59 HYPERTENSION ASSOCIATED WITH DIABETES: ICD-10-CM

## 2019-08-06 DIAGNOSIS — R79.83 HOMOCYSTEINEMIA: ICD-10-CM

## 2019-08-06 DIAGNOSIS — I82.4Y2 DEEP VEIN THROMBOSIS (DVT) OF PROXIMAL VEIN OF LEFT LOWER EXTREMITY, UNSPECIFIED CHRONICITY: ICD-10-CM

## 2019-08-06 DIAGNOSIS — I15.2 HYPERTENSION ASSOCIATED WITH DIABETES: ICD-10-CM

## 2019-08-06 DIAGNOSIS — D63.8 ANEMIA, CHRONIC DISEASE: ICD-10-CM

## 2019-08-06 DIAGNOSIS — Z79.01 CHRONIC ANTICOAGULATION: ICD-10-CM

## 2019-08-06 DIAGNOSIS — Z15.89 HETEROZYGOUS MTHFR MUTATION C677T: ICD-10-CM

## 2019-08-06 DIAGNOSIS — E11.40 TYPE 2 DIABETES MELLITUS WITH DIABETIC NEUROPATHY, WITHOUT LONG-TERM CURRENT USE OF INSULIN: ICD-10-CM

## 2019-08-06 DIAGNOSIS — N18.30 CKD (CHRONIC KIDNEY DISEASE), STAGE III: ICD-10-CM

## 2019-08-06 DIAGNOSIS — M25.552 HIP PAIN, LEFT: ICD-10-CM

## 2019-08-06 DIAGNOSIS — R10.32 LEFT GROIN PAIN: Primary | ICD-10-CM

## 2019-08-06 LAB
ALBUMIN SERPL BCP-MCNC: 3.6 G/DL (ref 3.5–5.2)
ALP SERPL-CCNC: 128 U/L (ref 55–135)
ALT SERPL W/O P-5'-P-CCNC: 20 U/L (ref 10–44)
ANION GAP SERPL CALC-SCNC: 10 MMOL/L (ref 8–16)
AST SERPL-CCNC: 27 U/L (ref 10–40)
BASOPHILS # BLD AUTO: 0.05 K/UL (ref 0–0.2)
BASOPHILS # BLD AUTO: 0.05 K/UL (ref 0–0.2)
BASOPHILS NFR BLD: 0.8 % (ref 0–1.9)
BASOPHILS NFR BLD: 0.8 % (ref 0–1.9)
BILIRUB SERPL-MCNC: 0.3 MG/DL (ref 0.1–1)
BUN SERPL-MCNC: 46 MG/DL (ref 8–23)
CALCIUM SERPL-MCNC: 9.6 MG/DL (ref 8.7–10.5)
CHLORIDE SERPL-SCNC: 99 MMOL/L (ref 95–110)
CO2 SERPL-SCNC: 29 MMOL/L (ref 23–29)
CREAT SERPL-MCNC: 1.8 MG/DL (ref 0.5–1.4)
DIFFERENTIAL METHOD: ABNORMAL
DIFFERENTIAL METHOD: ABNORMAL
EOSINOPHIL # BLD AUTO: 0.3 K/UL (ref 0–0.5)
EOSINOPHIL # BLD AUTO: 0.3 K/UL (ref 0–0.5)
EOSINOPHIL NFR BLD: 5.3 % (ref 0–8)
EOSINOPHIL NFR BLD: 5.3 % (ref 0–8)
ERYTHROCYTE [DISTWIDTH] IN BLOOD BY AUTOMATED COUNT: 14.1 % (ref 11.5–14.5)
ERYTHROCYTE [DISTWIDTH] IN BLOOD BY AUTOMATED COUNT: 14.1 % (ref 11.5–14.5)
EST. GFR  (AFRICAN AMERICAN): 28.4 ML/MIN/1.73 M^2
EST. GFR  (NON AFRICAN AMERICAN): 24.6 ML/MIN/1.73 M^2
FERRITIN SERPL-MCNC: 261 NG/ML (ref 20–300)
GLUCOSE SERPL-MCNC: 104 MG/DL (ref 70–110)
HCT VFR BLD AUTO: 30.2 % (ref 37–48.5)
HCT VFR BLD AUTO: 30.2 % (ref 37–48.5)
HGB BLD-MCNC: 9.2 G/DL (ref 12–16)
HGB BLD-MCNC: 9.2 G/DL (ref 12–16)
IMM GRANULOCYTES # BLD AUTO: 0.02 K/UL (ref 0–0.04)
IMM GRANULOCYTES # BLD AUTO: 0.02 K/UL (ref 0–0.04)
IMM GRANULOCYTES NFR BLD AUTO: 0.3 % (ref 0–0.5)
IMM GRANULOCYTES NFR BLD AUTO: 0.3 % (ref 0–0.5)
IRON SERPL-MCNC: 49 UG/DL (ref 30–160)
LYMPHOCYTES # BLD AUTO: 1.7 K/UL (ref 1–4.8)
LYMPHOCYTES # BLD AUTO: 1.7 K/UL (ref 1–4.8)
LYMPHOCYTES NFR BLD: 27.1 % (ref 18–48)
LYMPHOCYTES NFR BLD: 27.1 % (ref 18–48)
MCH RBC QN AUTO: 31.2 PG (ref 27–31)
MCH RBC QN AUTO: 31.2 PG (ref 27–31)
MCHC RBC AUTO-ENTMCNC: 30.5 G/DL (ref 32–36)
MCHC RBC AUTO-ENTMCNC: 30.5 G/DL (ref 32–36)
MCV RBC AUTO: 102 FL (ref 82–98)
MCV RBC AUTO: 102 FL (ref 82–98)
MONOCYTES # BLD AUTO: 0.7 K/UL (ref 0.3–1)
MONOCYTES # BLD AUTO: 0.7 K/UL (ref 0.3–1)
MONOCYTES NFR BLD: 11.9 % (ref 4–15)
MONOCYTES NFR BLD: 11.9 % (ref 4–15)
NEUTROPHILS # BLD AUTO: 3.4 K/UL (ref 1.8–7.7)
NEUTROPHILS # BLD AUTO: 3.4 K/UL (ref 1.8–7.7)
NEUTROPHILS NFR BLD: 54.6 % (ref 38–73)
NEUTROPHILS NFR BLD: 54.6 % (ref 38–73)
NRBC BLD-RTO: 0 /100 WBC
NRBC BLD-RTO: 0 /100 WBC
PLATELET # BLD AUTO: 206 K/UL (ref 150–350)
PLATELET # BLD AUTO: 206 K/UL (ref 150–350)
PMV BLD AUTO: 11.5 FL (ref 9.2–12.9)
PMV BLD AUTO: 11.5 FL (ref 9.2–12.9)
POTASSIUM SERPL-SCNC: 5 MMOL/L (ref 3.5–5.1)
PROT SERPL-MCNC: 6.9 G/DL (ref 6–8.4)
RBC # BLD AUTO: 2.95 M/UL (ref 4–5.4)
RBC # BLD AUTO: 2.95 M/UL (ref 4–5.4)
SATURATED IRON: 16 % (ref 20–50)
SODIUM SERPL-SCNC: 138 MMOL/L (ref 136–145)
TOTAL IRON BINDING CAPACITY: 309 UG/DL (ref 250–450)
TRANSFERRIN SERPL-MCNC: 209 MG/DL (ref 200–375)
WBC # BLD AUTO: 6.24 K/UL (ref 3.9–12.7)
WBC # BLD AUTO: 6.24 K/UL (ref 3.9–12.7)

## 2019-08-06 PROCEDURE — 99999 PR PBB SHADOW E&M-EST. PATIENT-LVL IV: ICD-10-PCS | Mod: PBBFAC,,, | Performed by: NURSE PRACTITIONER

## 2019-08-06 PROCEDURE — 85025 COMPLETE CBC W/AUTO DIFF WBC: CPT

## 2019-08-06 PROCEDURE — 83540 ASSAY OF IRON: CPT

## 2019-08-06 PROCEDURE — 1101F PT FALLS ASSESS-DOCD LE1/YR: CPT | Mod: CPTII,S$GLB,, | Performed by: NURSE PRACTITIONER

## 2019-08-06 PROCEDURE — 80053 COMPREHEN METABOLIC PANEL: CPT

## 2019-08-06 PROCEDURE — 82728 ASSAY OF FERRITIN: CPT

## 2019-08-06 PROCEDURE — 1101F PR PT FALLS ASSESS DOC 0-1 FALLS W/OUT INJ PAST YR: ICD-10-PCS | Mod: CPTII,S$GLB,, | Performed by: NURSE PRACTITIONER

## 2019-08-06 PROCEDURE — 99214 OFFICE O/P EST MOD 30 MIN: CPT | Mod: S$GLB,,, | Performed by: NURSE PRACTITIONER

## 2019-08-06 PROCEDURE — 99214 PR OFFICE/OUTPT VISIT, EST, LEVL IV, 30-39 MIN: ICD-10-PCS | Mod: S$GLB,,, | Performed by: NURSE PRACTITIONER

## 2019-08-06 PROCEDURE — 99999 PR PBB SHADOW E&M-EST. PATIENT-LVL IV: CPT | Mod: PBBFAC,,, | Performed by: NURSE PRACTITIONER

## 2019-08-06 PROCEDURE — 36415 COLL VENOUS BLD VENIPUNCTURE: CPT | Mod: PO

## 2019-08-06 RX ORDER — DOCUSATE SODIUM 100 MG/1
100 CAPSULE, LIQUID FILLED ORAL 2 TIMES DAILY
Qty: 90 CAPSULE | Refills: 0 | COMMUNITY
Start: 2019-08-06 | End: 2021-03-23

## 2019-08-06 RX ORDER — GLIPIZIDE 2.5 MG/1
2.5 TABLET, EXTENDED RELEASE ORAL
Qty: 90 TABLET | Refills: 3 | Status: SHIPPED | OUTPATIENT
Start: 2019-08-06 | End: 2019-11-29 | Stop reason: SDUPTHER

## 2019-08-06 NOTE — PROGRESS NOTES
Subjective:       Patient ID: Blaire Cage is a 89 y.o. female.    Chief Complaint: Follow-up    Ms. Cage presents today for a F/U appointment. She reports her left hip and groin pain are much better since resuming PT. Son reports she is getting around well with her walker. Still smoking- family has concerns with this. Has home O2 but is unsure how often she should be wearing this. Requesting Januvia be changed due to cost. Vitals stable.     Patient Active Problem List   Diagnosis    Diabetic neuropathy, type II diabetes mellitus    CKD (chronic kidney disease), stage III, eGFR 39, progressive 31    Hypothyroidism    Hypertension associated with diabetes    Hyperlipidemia associated with type 2 diabetes mellitus    Type 2 diabetes mellitus with stage 3 chronic kidney disease    Right carotid bruit    COPD mixed type    Anxiety    Tobacco dependence    Abdominal aortic aneurysm without rupture    Hip arthritis    Degenerative spinal arthritis    Osteoporosis    Left ventricular diastolic dysfunction with preserved systolic function    Hypertensive left ventricular hypertrophy, without heart failure    Smoker, quit 5/2016, 25 pck-years    Abdominal obesity    Absolute anemia    Body mass index (BMI) 23.0-23.9, adult, today 24.1    Proteinuria due to type 2 diabetes mellitus    History of syncope in childhood    Prerenal azotemia    Drug-induced constipation    COPD with acute exacerbation    Asthmatic bronchitis with acute exacerbation    Shortness of breath    Controlled type 2 diabetes mellitus, without long-term current use of insulin    Acute pulmonary embolism    SOB (shortness of breath)    Asthma-COPD overlap syndrome    Eosinophilic asthma    Moderate malnutrition    Debility    Type 2 diabetes mellitus with kidney complication, without long-term current use of insulin    Chronic anticoagulation    Constipation    DVT (deep venous thrombosis)    History of  pulmonary embolism    Anemia due to multiple mechanisms    Anemia, chronic disease    Normocytic normochromic anemia    Anemia, chronic renal failure, stage 3 (moderate)    Homocysteinemia    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   HENT: Negative for hearing loss, rhinorrhea and trouble swallowing.    Eyes: Negative for discharge and visual disturbance.   Respiratory: Negative for chest tightness and wheezing.    Cardiovascular: Negative for chest pain and palpitations.   Gastrointestinal: Positive for constipation. Negative for blood in stool, diarrhea and vomiting.   Endocrine: Negative for polydipsia and polyuria.   Genitourinary: Negative for difficulty urinating, dysuria, hematuria and menstrual problem.   Musculoskeletal: Positive for arthralgias. Negative for joint swelling and neck pain.   Neurological: Negative for weakness and headaches.   Psychiatric/Behavioral: Negative for confusion and dysphoric mood.       Objective:      Physical Exam   Constitutional: She is oriented to person, place, and time. She appears well-developed and well-nourished.   Cardiovascular: Normal rate, regular rhythm and normal heart sounds.   Pulmonary/Chest: Effort normal and breath sounds normal.   Abdominal: Soft.   Musculoskeletal: She exhibits no edema.   Ambulating well with walker   Neurological: She is alert and oriented to person, place, and time.   Skin: Skin is warm and dry.   Psychiatric: She has a normal mood and affect.   Nursing note and vitals reviewed.      Assessment:       1. Left groin pain    2. Hip pain, left    3. Type 2 diabetes mellitus with diabetic neuropathy, without long-term current use of  insulin    4. Hypertension associated with diabetes    5. CKD (chronic kidney disease), stage III, eGFR 39, progressive 31    6. Anemia, chronic renal failure, stage 3 (moderate)    7. Chronic anticoagulation    8. Constipation, unspecified constipation type    9. Tobacco dependence        Plan:       Blaire was seen today for follow-up.    Diagnoses and all orders for this visit:    Left groin pain  Improved with PT    Hip pain, left  Improved with PT    Type 2 diabetes mellitus with diabetic neuropathy, without long-term current use of insulin  -     glipiZIDE (GLUCOTROL) 2.5 MG TR24; Take 1 tablet (2.5 mg total) by mouth daily with breakfast.  D/C januvia, start glipizide  Patient to monitor BG at home  Educated on side effects of medication, specifically needs to watch for hypoglycemia. Handout provided  Call clinic if symptomatic or if BG below 85  F/U as scheduled with log    Hypertension associated with diabetes  Controlled on current medications    CKD (chronic kidney disease), stage III, eGFR 39, progressive 31  Nephrology following. Has an upcoming appointment with Dr. Sandro Greene and chronic.  Will continue to monitor q3-6 months and control chronic conditions as optimally as possible to preserve function.    Anemia, chronic renal failure, stage 3 (moderate)  Monitor  Hematology following  Recent CBC improved    Chronic anticoagulation  Hematology following    Constipation, unspecified constipation type  -     docusate sodium (COLACE) 100 MG capsule; Take 1 capsule (100 mg total) by mouth 2 (two) times daily.  Jessie-colace caused nausea, try colace alone  Patient was counseled that these lifestyle changes can help prevent constipation:  · Diet. Eat a high-fiber diet, with fresh fruit and vegetables, and reduce dairy intake, meats, and processed foods.  An over the counter fiber supplement is recommended such as Fiberchoice chewables or Metamucil.  · Fluids. It's important to get enough fluids each day.  Drink plenty of water when you eat more fiber. If you are on diet that limits the amount of fluid you can have, talk about this with your health care provider.  · Regular exercise. Check with your health care provider first.  I also advised use of over the counter stool softeners like docusate as needed for persistent constipation.  OTC probiotics may also be of benefit like Align to help regulation.  If acutely constipated the patient was advised to use otc fleets enema(s) and/or magnesium citrate to relieve symptoms.      Tobacco dependence  Encouraged smoking cessation  Educate to avoid smoking around oxygen    Asthma-COPD overlap syndrome  Encouraged smoking cessation   Pulmonology following- will touch base regarding oxygen    F/U as scheduled

## 2019-08-06 NOTE — PATIENT INSTRUCTIONS
Stop januvia  Start glipizide tomorrow in the morning with breakfast  Monitor blood sugar before meals and bedtime  Monitor for hypoglycemia- see handout. New medication can cause low blood sugar.   If not feeling good (lightheaded, dizzy, weak) check blood sugar  Call clinic if blood sugar falls below 85.   Bring log of blood sugars to follow up appointment.   Hypoglycemia, Oral Diabetic Medicine  You have been treated for low blood sugar (hypoglycemia) caused by your diabetes medicine. Oral diabetes medicines include:  · Glyburide  · Glipizide  · Acarbose  · Metformin  · Pioglitazone  · Sitagliptin  · Canigliflozin  · Colesevelam  Low blood sugar can occur when you continue to take your usual dose of diabetes medicine but you skip meals or dont eat the amount of food that your body is used to. It can also result from taking too much diabetes medicine.  Other factors that can lower blood sugar while taking diabetes medicine include intense exercise, strong emotions, alcohol use, tobacco, caffeine, and certain medicines. These medicines include:  · Aspirin  · Haloperidol  · Propoxyphene  · Chlorpromazine  · Propranolol  · Disopyramide  · Lisinopril (and other angiotensin-converting enzyme inhibitors)  In addition, some over-the-counter cough and cold products contain alcohol, which can affect your blood sugar levels.  A class of medicines called beta-blockers is used for high blood pressure, rapid heart rate, and other conditions. Beta-blockers may prevent the early symptoms of low blood sugar. If you are taking a beta-blocker, you might not realize that your blood sugar is getting low. If you take a beta-blocker, talk to your healthcare provider about switching to a different class. The beta-blocker class includes:  · Propranolol  · Atenolol  · Metoprolol  · Nadolol  · Labetalol  · Carvedilol  Home care  · During the next 24 hours rest and eat frequent small meals. This will help prevent the return of low blood  sugar.  · Learn the signals your body gives as your blood sugar drops (see below).  If symptoms of hypoglycemia return  · Keep a source of fast-acting sugar with you. At the first sign of low blood sugar, eat or drink 15 to 20 grams of fast-acting sugar. Examples include:  ¨ 3 to 4 glucose tablets (found at most drugstores)  ¨ 4 ounces of regular soda  ¨ 4 ounces of fruit juice  ¨ 2 tablespoons of raisins  ¨ 1 tablespoon of honey  · For other sources, check the sugar content on the nutrition label to figure out how much you need to eat or drink to get at least 15 grams of sugar.  · Check your blood sugar 15 minutes after treating yourself. If it is still low, take another 15 to 20 grams of fast-acting sugar. Test again in 15 minutes. If it remains low, call your healthcare provider or go to an emergency room.  · Once blood sugar returns to normal, eat a snack or meal to keep your blood sugar in a safe range.  In the future, if you are unable to eat your normal amount due to illness or vomiting, stop taking your diabetes medicine and contact your healthcare provider.  Wear a medical alert bracelet or necklace, or carry a card in your wallet explaining that you have diabetes. If you have a severe hypoglycemic reaction and cannot give this information, it will help medical personnel provide proper care.  Follow-up care  Follow up with your healthcare provider, or as advised.  If you have the equipment to monitor your blood sugar, do so at least twice a day--before breakfast and before dinner. Do this for the next 5 days. See your healthcare provider during the next week to review these records. This will help determine if you need an adjustment in your diabetes medicine.  For more information about diabetes, contact the American Diabetes Association, at www.diabetes.org.  When to seek medical advice  Call your healthcare provider right away if any of these symptoms of low blood sugar  occur.  · Fatigue  · Headache  · Shakes  · Excess sweating  · Hunger  · Feeling anxious or restless  · Vision changes  · Drowsiness  · Weakness  · Confusion  · Personality changes  · Seizure or loss of consciousness  Date Last Reviewed: 6/1/2016 © 2000-2017 [employer]. [address] [place] [address], [place] [address]. All rights reserved. This information is not intended as a substitute for professional medical care. Always follow your healthcare professional's instructions.        Hypoglycemia (Low Blood Sugar)     Fast-acting sugar includes a cup of nonfat milk.     Too little sugar (glucose) in your blood is called hypoglycemia or low blood sugar. Low blood sugar usually means anything lower than 70 mg/dL. Talk with your healthcare provider about your target range and what level is too low for you. Diabetes itself doesnt cause low blood sugar. But some of the treatments for diabetes, such as pills or insulin, may raise your risk for it. Low blood sugar may cause you to pass out or have a seizure. So always treat low blood sugar right away, but don't overeat.  Special note: Always carry a source of fast-acting sugar and a snack in case of hypoglycemia.   What you may notice  If you have low blood sugar, you may have one or more of these symptoms:  · Shakiness or dizziness  · Cold, clammy skin or sweating  · Feelings of hunger  · Headache  · Nervousness  · A hard, fast heartbeat  · Weakness  · Confusion or irritability  · Blurred vision  · Having nightmares or waking up confused or sweating  · Numbness or tingling in the lips or tongue  What you should do  Here are tips to follow if you have hypoglycemia:   · First check your blood sugar. If it is too low (out of your target range), eat or drink 15 to 20 grams of fast-acting sugar. This may be 3 to 4 glucose tablets, 4 ounces (half a cup) of fruit juice or regular (nondiet) soda, 8 ounces (1 cup) of fat-free milk, or 1 tablespoon of honey. Dont take more  than this, or your blood sugar may go too high.  · Wait 15 minutes. Then recheck your blood sugar if you can.  · If your blood sugar is still too low, repeat the steps above and check your blood sugar again. If your blood sugar still has not returned to your target range, contact your healthcare provider or seek emergency care.  · Once your blood sugar returns to target range, eat a snack or meal.  Preventing low blood sugar  Things you can do include the following:   · If your condition needs a strict treatment plan, eat your meals and snacks at the same times each day. Dont skip meals!  · If your treatment plan lets you change when you eat and what you eat, learn how to change the time and dose of your rapid-acting insulin to match this.   · Ask your healthcare provider if it is safe for you to drink alcohol. Never drink on an empty stomach.  · Take your medicine at the prescribed times.  · Always carry a source of fast-acting sugar and a snack when youre away from home.  Other things to do  Additional tips include the following:  · Carry a medical ID card, a compact USB drive, or wear a medical alert bracelet or necklace. It should say that you have diabetes. It should also say what to do if you pass out or have a seizure.  · Make sure your family, friends, and coworkers know the signs of low blood sugar. Tell them what to do if your blood sugar falls very low and you cant treat yourself.  · Keep a glucagon emergency kit handy. Be sure your family, friends, and coworkers know how and when to use it. Check it regularly and replace the glucagon before it expires.  · Talk with your health care team about other things you can do to prevent low blood sugar.     If you have unexplained hypoglycemia or hypoglycemia several times, call your healthcare provider.   Date Last Reviewed: 5/1/2016  © 0809-9921 [employer]. [address] [place] [address], [place] [address]. All rights reserved. This information is not  intended as a substitute for professional medical care. Always follow your healthcare professional's instructions.

## 2019-08-09 ENCOUNTER — OFFICE VISIT (OUTPATIENT)
Dept: CARDIOLOGY | Facility: CLINIC | Age: 84
End: 2019-08-09
Payer: MEDICARE

## 2019-08-09 VITALS
HEIGHT: 63 IN | BODY MASS INDEX: 22.19 KG/M2 | SYSTOLIC BLOOD PRESSURE: 149 MMHG | WEIGHT: 125.25 LBS | HEART RATE: 71 BPM | DIASTOLIC BLOOD PRESSURE: 62 MMHG | OXYGEN SATURATION: 92 %

## 2019-08-09 DIAGNOSIS — E11.69 HYPERLIPIDEMIA ASSOCIATED WITH TYPE 2 DIABETES MELLITUS: ICD-10-CM

## 2019-08-09 DIAGNOSIS — D63.8 ANEMIA OF CHRONIC DISEASE: ICD-10-CM

## 2019-08-09 DIAGNOSIS — E78.5 HYPERLIPIDEMIA ASSOCIATED WITH TYPE 2 DIABETES MELLITUS: ICD-10-CM

## 2019-08-09 DIAGNOSIS — E65 ABDOMINAL OBESITY: ICD-10-CM

## 2019-08-09 DIAGNOSIS — D50.0 IRON DEFICIENCY ANEMIA DUE TO CHRONIC BLOOD LOSS: ICD-10-CM

## 2019-08-09 DIAGNOSIS — Z79.01 CHRONIC ANTICOAGULATION: ICD-10-CM

## 2019-08-09 DIAGNOSIS — E11.59 HYPERTENSION ASSOCIATED WITH DIABETES: ICD-10-CM

## 2019-08-09 DIAGNOSIS — I71.40 ABDOMINAL AORTIC ANEURYSM WITHOUT RUPTURE: ICD-10-CM

## 2019-08-09 DIAGNOSIS — N18.4 CONTROLLED TYPE 2 DIABETES MELLITUS WITH STAGE 4 CHRONIC KIDNEY DISEASE, WITHOUT LONG-TERM CURRENT USE OF INSULIN: ICD-10-CM

## 2019-08-09 DIAGNOSIS — I15.2 HYPERTENSION ASSOCIATED WITH DIABETES: ICD-10-CM

## 2019-08-09 DIAGNOSIS — Z99.81 ON HOME OXYGEN THERAPY: ICD-10-CM

## 2019-08-09 DIAGNOSIS — I11.9 HYPERTENSIVE LEFT VENTRICULAR HYPERTROPHY, WITHOUT HEART FAILURE: ICD-10-CM

## 2019-08-09 DIAGNOSIS — N18.30 CKD (CHRONIC KIDNEY DISEASE), STAGE III: ICD-10-CM

## 2019-08-09 DIAGNOSIS — R79.89 PRERENAL AZOTEMIA: Primary | ICD-10-CM

## 2019-08-09 DIAGNOSIS — R00.1 BRADYCARDIA: ICD-10-CM

## 2019-08-09 DIAGNOSIS — E11.22 CONTROLLED TYPE 2 DIABETES MELLITUS WITH STAGE 4 CHRONIC KIDNEY DISEASE, WITHOUT LONG-TERM CURRENT USE OF INSULIN: ICD-10-CM

## 2019-08-09 PROBLEM — R53.81 DEBILITY: Status: RESOLVED | Noted: 2018-06-28 | Resolved: 2019-08-09

## 2019-08-09 PROBLEM — R55 RECURRENT SYNCOPE: Status: RESOLVED | Noted: 2018-11-30 | Resolved: 2019-08-09

## 2019-08-09 PROCEDURE — 99499 RISK ADDL DX/OHS AUDIT: ICD-10-PCS | Mod: S$GLB,,, | Performed by: INTERNAL MEDICINE

## 2019-08-09 PROCEDURE — 1101F PT FALLS ASSESS-DOCD LE1/YR: CPT | Mod: CPTII,S$GLB,, | Performed by: INTERNAL MEDICINE

## 2019-08-09 PROCEDURE — 99999 PR PBB SHADOW E&M-EST. PATIENT-LVL III: CPT | Mod: PBBFAC,,, | Performed by: INTERNAL MEDICINE

## 2019-08-09 PROCEDURE — 99999 PR PBB SHADOW E&M-EST. PATIENT-LVL III: ICD-10-PCS | Mod: PBBFAC,,, | Performed by: INTERNAL MEDICINE

## 2019-08-09 PROCEDURE — 99215 PR OFFICE/OUTPT VISIT, EST, LEVL V, 40-54 MIN: ICD-10-PCS | Mod: S$GLB,,, | Performed by: INTERNAL MEDICINE

## 2019-08-09 PROCEDURE — 1101F PR PT FALLS ASSESS DOC 0-1 FALLS W/OUT INJ PAST YR: ICD-10-PCS | Mod: CPTII,S$GLB,, | Performed by: INTERNAL MEDICINE

## 2019-08-09 PROCEDURE — 99215 OFFICE O/P EST HI 40 MIN: CPT | Mod: S$GLB,,, | Performed by: INTERNAL MEDICINE

## 2019-08-09 PROCEDURE — 99499 UNLISTED E&M SERVICE: CPT | Mod: S$GLB,,, | Performed by: INTERNAL MEDICINE

## 2019-08-09 NOTE — PROGRESS NOTES
Patient ID:  Blaire Cage is a 89 y.o. female who presents for review of Follow-up      Health literacy:  medium  Activities: very active around the house   Nicotine: former  Alcohol: no  Illicit drugs: no  Cardiac symptoms: none  Home BP:yes  Medication compliance: yes  Diet: regular, diabetic, Mediterranean Low POT diet  Caffeine: 2 times a week  Labs:   Last Echo: 2018  Last stress test:2016  /Cardiovascular angiogram:   EC2019  /Fundoscopic exam:     No flowsheet data found.      HPI    ROS     Objective:    Physical Exam      Assessment:       No diagnosis found.     Plan:         There are no diagnoses linked to this encounter.

## 2019-08-09 NOTE — PROGRESS NOTES
"Subjective:    Patient ID:  Blaire Cage is a 89 y.o. female who presents for evaluation of Follow-up  For HTN, nausea, weakness, tachycardia and dehydration, multiple falls with left hip fracture needing operation in 3/2019  PCP: Dr. Mauricio, now Dr. Edmonds see every 3-4 months  GI: Dr. Flores, considering a colonoscopy  Renal: Dr. Jo  Prior cardiologist: Dr. Fisher, last seen 10/2015  Lives alone but circulating children stay overnight, no pets, pet sit daughter's dog, 3 daughters in the area, Trudi, on leave from [facility], here with patient.   Brother, Alejandro lives next door  Grand-daughter, Laura, daughter of Ash, other son David wants patient to use [employer]  Here with daughter, Toya, have POA, other daughters also participate in daily visit  HHC weekly, PT once a week restarted after recent fall.  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 female with PMH of COPD, hyperlipidemia, hypertension, hypothyroidism, GERD and arthritis presents to the ED for evaluation of lightheadedness "room spinning" with Nausea and vomiting. She states she was getting ready for [employer] and was standing at counter when she began to have "spinning, so I sat down in chair and started vomiting." She reports near syncope while talking with family on phone. Denies chest pain and shortness of breath. She is " "asymptomatic at this time. Patient noted to have bradycardia on telemetry.    Hospital Course (synopsis of major diagnoses, care, treatment, and services provided during the course of the hospital stay): Patient with bradycardia. Negative orthostatics. Patiens HR improved changing metoprolol to coreg. She was noted to have hypotension which improved with gentle hydration. Patient feeling better without complaints. Denies further N/V since admittance. Will decreased norvasc to 5 mg daily."    Old record reviewed, had Echo in 10/2015 - CONCLUSIONS     1 - Concentric hypertrophy. LV wall thickness is abnormal, with the septum and the posterior wall each measuring 1.3 cm across.    2 - Normal left ventricular systolic function (EF 60-65%).     3 - Left ventricular diastolic dysfunction. E/e'(lat) is 31    4 - Normal right ventricular systolic function .     5 - Pulmonary hypertension. The estimated PA systolic pressure is 47 mmHg.     6 - Trivial to mild aortic stenosis, JOHN = 1.72 cm2, peak velocity = 1.75 m/s, mean gradient = 7.0 mmHg.     7 - Mild mitral regurgitation.     8 - Mild tricuspid regurgitation.     Event monitor, 30 days - TEST DESCRIPTION   8 episodes of "heart racing" - in NSR with PAC, rate 59 to 113 bpm.  5 episodes of "lightheaded" - in NSR with PAC, rate 58 to 84 bpm.  3 episodes of "chest pain" - NSR with PAC, rate 67 to 88 bpm. Without ST abnormalities.  4 episodes of SOB - NSR with PAC, rate 67 to 103 bpm.  1 episode of isolated "skipped beat" - NSR with PAC, rate 76 bpm.  Max heart rate 130, minimum HR of 50, and average HR of 72 bpm.    CONCLUSIONS   Multiple symptoms reported, frequent PACs noted, rare PVC detected.   Chest pain noted without ST changes.    Since DC occasional WEST, no CP, no dizziness. Quit smoking for past 3 weeks. Lots of CV risk factors. Last lipid panel in 8/2015, LDL 94, non-. Last A1C 6.3% in 6/2016.    In 10/2016, here post hospital visit. DCS - "admitted for " "syncope and symptomatic bradycardia. She was on a BB at admission which was stopped. She had no further symptoms and was stable throughout her admission. Patient was seen by cardiology and felt not to be a candidate for PPM placement. She was set up with an event monitor at time of discharge and F/U with cardiology."  Chart reviewed had bradycardia with rate down to 37 on Coreg 3.125 mg bid.  Reviewed by Dr. Driscoll - "IMPRESSION:  1. Syncopal episode could be due to drug-induced i.e., Coreg, possible    vasovagal.  2. Hypertension.  3. History of COPD.     RECOMMENDATIONS:  1. From cardiac standpoint, we would recommend to check for orthostatic    changes.  2. Event monitor recorder to rule out any arrhythmias. The patient needs to    follow up with Dr. Herbert.  3. To avoid heart rate slowing drugs, recommend to take off the Coreg and use    drugs, which do not slow the heart rate.    DSE 6/2016 - EKG Conclusions:    1. The EKG portion of this study is negative for ischemia at a peak heart rate of 125 bpm (95% of predicted).   2. Blood pressure remained stable throughout the protocol  (Presenting BP: 147/87 Peak BP: 205/64).   3. The following arrhythmias were present: PVCs.   4. There were no symptoms of chest discomfort or significant dyspnea throughout the protocol.     ECHO CONCLUSIONS     1 - Concentric remodeling. the septum measuring 1.2 cm and the posterior wall measuring 1.3 cm across.    2 - Hyperdynamic left ventricular systolic function (EF 65-70%).     3 - Left ventricular diastolic dysfunction. E/e'(lat) is 19    4 - Normal right ventricular systolic function .     5 - Pulmonary hypertension. The estimated PA systolic pressure is 47 mmHg.     6 - Small pericardial effusion.     7 - No siginificant change from Echo in 10/2015.     No evidence of stress induced myocardial ischemia.     At home have good and bad (tired) days. Stay active, do own housework. Walking around the yard without problems. ECG shows " "NSR, PAC rate 73. Orthostatic VS shows no significant drop in BP and HR 72-75 bpm. Event monitor in place. No further near-syncope.     In 11/2016, no new problem. Home BP log reviewed 102-188/50-80, HR 60-85, on amlodipine 10 mg daily, 42% of readings with SBP > 150, mostly in the afternoon where 43 out of 55 readings with SBP >150. Problem with Coreg as noted. The only time with symptoms is when the BP readings are elevated. Have significant CKD stage III, eGFR 39 with marked anemia, Hgb 10.6 with iron deficiency, iron sat only 12% and ferritin at 42. On iron supplement bid.     In 12/2016, had recurrent syncope in October and November 2016. Noted constipation with the iron supplement, at times no BM for 3 days. Labs reviewed eGFR 31, have pre-renal azotemia BUN/Cr 34/1.7, Hgb 10.6. Report drinking about 80 oz daily, IOM recommendation for women is 91 oz daily.  ED note 11/11/2016 - "86 y.o. female with a pmhx of Anticoagulant long-term use; COPD; Diabetes mellitus type II; ; Hyperlipidemia; Hypertension; and Thyroid disease presenting to the E.D. with generalized weakness. Pt states she had a large bowel movement after being constipated recently and subsequently became dizzy upon standing up from the commode. She has had similar episodes of dizziness upon positional changes. Denies fever, chest pain, blood in stool, HA, visual changes, numbness, specific weakness. Past Surgical History: hysterectomy, appendectomy.     86 y.o. female presenting with episode of syncope preceded by positional change with onset of symptoms. Workup was undertaken based on patient's age. She is asymptomatic since arrival. She was given 500 cc normal saline solution IV. Upon standing following fluids, she feels well with no significant orthostasis or difficulty walking. Very low suspicion for ACS and I do not think cardiac biomarker is indicated. EKG reviewed with no sign of acute ischemic process or arrhythmia. No arrhythmia evident " "here on monitor. Per medical record reviewed with 30 day event monitor results showing no sign of significant arrhythmia. I did speak with her cardiologist Dr. Herbert who agreed with discharge and outpatient follow-up if patient is doing well with ambulation and no significant orthostatic symptoms. Patient has baseline renal insufficiency with creatinine similar to prior. She has a stable anemia. Low suspicion for other emergent process. Very low suspicion for emergent intracranial process. I had discussed extensively with family and patient has significant family support. They are comfortable taking her home. Outpatient PCP or cardiology follow-up recommended next week. Detailed return precautions reviewed."    In 2/2017, here post ED visit for nausea, weakness, tachycardia, and dehydration because did not drink 100 oz of fluid, improved with IVF. Since ED remain active, no problem. Home /70. Worry about walking due to possible fall after being tripped by a dog, 17 years ago.     In 12/2018 here for post hospital review:  DCS - "88 y.o. female with DM type 2, Hypertension, diastolic HF, COPD, prior PE on Apixaban, and recurrent syncope who presents to the ED for evaluation of syncope that occurred this morning.  She states she was sitting at the table and "just didn't feel good with lightheadedness" and passed out associated with nausea and vomiting x 1.  According to her daughter, the patient was unconscious for approximately 5 minutes, came to and then became unconscious a second time while on the phone with EMS.  Patient states she does not recall passing out. The patient/daughter deny head trauma, difficulty urinating or any other symptoms at this time. Patient reports "I have been having fainting my whole life, especially when I listening or talking about sad things."  Patient has history of recurrent syncopal events in the past and is followed by cardiology. Patient was evaluated in the ER where her " "orthostatic vitals in ED were positive. Patient was placed in the hospital for further evaluation and treatment.      * No surgery found *       Hospital Course:   Patient monitored closely during observation stay. She was found to have significant orthostatic hypotension. Home BP medications  and lasix were placed on hold.  She received IVF for hydration. Cardiology was consulted. MRI of the brain without contrast showed there was no acute abnormality. There was generalized volume loss and nonspecific white matter change.  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 There was no hemorrhage, mass effect or acute infarctionobtained and no acute process demonstrated. PT and OT were consulted for debility. There were concerns patient lived at home alone on OAC with history of recurrent syncope noted. These concerns were discussed with patient along with possible option for placement. Patient reported she wanted to return living back to her home and that her children looked in on her often. She did agree to Home Health. It was discussed with patient that she should get a Life Alert System or something equivalent to that for home use and she said she would discuss that with her children. Patient was noted to have improvement in her condition. Her SHALINI resolved. Her orthostatics later were no longer positive. Patient had no signs of syncope or presyncope and she was discharged to home once cleared by Cardiology. Her home Norvasc and lasix doses were reduced. Home Health was ordered for RN, PT, OT evaluate and treat."    My note - reviewed history of lifelong recurrent syncope which makes vaso-vagal the most likely etiology, at her age she is at risk for arrhythmic issues also. ILR is a reasonable option. She have colonoscopy schedule this Monday, she will need to be monitored closely. ILR can be done as OP when she decide to proceed.     HPI comments: in 8/2019, here for follow " up, no heart worries. ILR reviewed, no significant arrhythmia, noted tachycardia due to T-wave oversensing. Multiple falls x 2 and broke left hip in 3/2019, return to ED after missing the chair on trying to sit.   Echo 4/2018  Left ventricle ejection fraction is normal.  Tricuspid valve shows mild regurgitation.  Left ventricular diastolic filling is abnormal.  Left atrium is moderately dilated.  Left ventricle shows moderate concentric hypertrophy.  Mitral Valve: The following structural abnormalities were observed: non-specific thickening. There is mild valve leaflet thickening. There is mild valve leaflet calcification. There is moderate mitral annular calcification. Mild stenosis.    Review of Systems   Constitution: Positive for malaise/fatigue and weight loss (8 lbs since 12/2018). Negative for diaphoresis, fever, night sweats and weight gain.   HENT: Positive for hoarse voice and odynophagia. Negative for nosebleeds and tinnitus.    Eyes: Negative for visual disturbance.   Cardiovascular: Negative for chest pain, claudication, cyanosis, dyspnea on exertion, irregular heartbeat, leg swelling, near-syncope, orthopnea, palpitations and paroxysmal nocturnal dyspnea.   Respiratory: Positive for wheezing. Negative for cough, shortness of breath, sleep disturbances due to breathing and snoring.         Kouts score 2   Endocrine: Positive for cold intolerance and polyuria. Negative for polydipsia.   Hematologic/Lymphatic: Does not bruise/bleed easily.   Skin: Positive for dry skin and poor wound healing. Negative for color change, flushing, nail changes and suspicious lesions.   Musculoskeletal: Positive for arthritis, back pain, falls, joint pain and muscle cramps. Negative for gout, joint swelling, muscle weakness and myalgias.   Gastrointestinal: Positive for constipation, flatus and melena (from iron supplement). Negative for heartburn, hematemesis, hematochezia and nausea.   Genitourinary: Positive for bladder  "incontinence, dysuria, flank pain and nocturia.   Neurological: Negative for disturbances in coordination, excessive daytime sleepiness, dizziness, focal weakness, headaches, light-headedness, loss of balance, numbness, vertigo and weakness.   Psychiatric/Behavioral: Positive for depression (lost son 3/21/2016, post heart surgery). Negative for substance abuse. The patient does not have insomnia and is not nervous/anxious.             Objective:    Physical Exam   Constitutional: She is oriented to person, place, and time. She appears well-developed and well-nourished.   HENT:   Head: Normocephalic.   Eyes: Pupils are equal, round, and reactive to light. Conjunctivae and EOM are normal.   Neck: Normal range of motion. Neck supple. No JVD present. No thyromegaly present.   Circumference 15.25"   Cardiovascular: Normal rate, regular rhythm and intact distal pulses. Exam reveals distant heart sounds. Exam reveals no gallop and no friction rub.   Murmur heard.   Medium-pitched machinery early systolic murmur is present with a grade of 2/6 at the upper right sternal border and upper left sternal border. Varicose veins  Pulses:       Dorsalis pedis pulses are 1+ on the right side, and 1+ on the left side.        Posterior tibial pulses are 1+ on the right side, and 1+ on the left side.   Superficial varicose veins in both LE.   Pulmonary/Chest: Effort normal and breath sounds normal. She has no rales. She exhibits no tenderness.   Diminished breath sounds   Abdominal: Soft. Bowel sounds are normal. There is no tenderness.   Waist 36" up to 37", hip 40.5"   Musculoskeletal: Normal range of motion. She exhibits no edema.   Lymphadenopathy:     She has no cervical adenopathy.   Neurological: She is alert and oriented to person, place, and time.   Skin: Skin is warm and dry. No rash noted.         Assessment:       1. Prerenal azotemia    2. Abdominal aortic aneurysm without rupture    3. Abdominal obesity    4. Iron " deficiency anemia due to chronic blood loss    5. On home oxygen therapy    6. Bradycardia    7. Anemia of chronic disease    8. CKD (chronic kidney disease), stage III, eGFR 39, progressive 31    9. Controlled type 2 diabetes mellitus with stage 4 chronic kidney disease, without long-term current use of insulin    10. Hyperlipidemia associated with type 2 diabetes mellitus    11. Hypertension associated with diabetes    12. Hypertensive left ventricular hypertrophy, without heart failure    13. Chronic anticoagulation         Plan:       Blaire was seen today for follow-up.    Diagnoses and all orders for this visit:    Prerenal azotemia    Abdominal aortic aneurysm without rupture    Abdominal obesity    Iron deficiency anemia due to chronic blood loss    On home oxygen therapy    Bradycardia    Anemia of chronic disease    CKD (chronic kidney disease), stage III, eGFR 39, progressive 31    Controlled type 2 diabetes mellitus with stage 4 chronic kidney disease, without long-term current use of insulin    Hyperlipidemia associated with type 2 diabetes mellitus    Hypertension associated with diabetes    Hypertensive left ventricular hypertrophy, without heart failure    Chronic anticoagulation    - All medical issues reviewed, continue current Rx.  - Instruction for Mediterranean diet and heart healthy exercise given.  - Need to drink 100 oz of fluid daily  - CV status stable, continue current Rx, all medications reviewed, patient acknowledge good understanding.  - Check home blood pressure, 2 days weekly, do 2 readings within 5 minutes in AM and PM, keep log for review.  - Consider use of walking sticks for ambulation.  - Recommend at least biannual cardiovascular evaluation in view of her significant risk factors. Patient's preference.    Patient Active Problem List   Diagnosis    Diabetic neuropathy, type II diabetes mellitus    CKD (chronic kidney disease), stage III, eGFR 39, progressive 31     Hypothyroidism    Hypertension associated with diabetes    Hyperlipidemia associated with type 2 diabetes mellitus    Type 2 diabetes mellitus with stage 3 chronic kidney disease    Right carotid bruit    COPD mixed type    Anxiety    Tobacco dependence    Abdominal aortic aneurysm without rupture    Hip arthritis    Degenerative spinal arthritis    Osteoporosis    Left ventricular diastolic dysfunction with preserved systolic function    Hypertensive left ventricular hypertrophy, without heart failure    Smoker, quit 5/2016, 25 pck-years    Abdominal obesity    Absolute anemia    Body mass index (BMI) 23.0-23.9, adult, today 24.1    Iron deficiency anemia due to chronic blood loss    Proteinuria due to type 2 diabetes mellitus    History of syncope in childhood    Prerenal azotemia    Drug-induced constipation    COPD with acute exacerbation    Asthmatic bronchitis with acute exacerbation    Shortness of breath    Controlled type 2 diabetes mellitus, without long-term current use of insulin    Acute pulmonary embolism    SOB (shortness of breath)    Asthma-COPD overlap syndrome    Eosinophilic asthma    Moderate malnutrition    Type 2 diabetes mellitus with kidney complication, without long-term current use of insulin    Chronic anticoagulation    Constipation    DVT (deep venous thrombosis)    History of pulmonary embolism    Anemia due to multiple mechanisms    Anemia, chronic disease    Normocytic normochromic anemia    Anemia, chronic renal failure, stage 3 (moderate)    Homocysteinemia    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    Hyperkalemia    Diastolic CHF, chronic    Anemia of chronic disease    Kidney stones    Bradycardia    Macrocytic anemia    Orthostatic hypotension    Closed left hip fracture    Hip pain, left    On home oxygen therapy     Total face-to-face time with the patient was 50 minutes and greater than 50% was spent in counseling and  coordination of care. The above assessment and plan have been discussed at length. Labs and procedure over the last 6 months reviewed. Problem List updated. Asked to bring in all active medications / pills bottles with next visit.

## 2019-08-19 ENCOUNTER — TELEPHONE (OUTPATIENT)
Dept: HOME HEALTH SERVICES | Facility: HOSPITAL | Age: 84
End: 2019-08-19

## 2019-08-21 ENCOUNTER — TELEPHONE (OUTPATIENT)
Dept: HOME HEALTH SERVICES | Facility: HOSPITAL | Age: 84
End: 2019-08-21

## 2019-08-26 ENCOUNTER — OFFICE VISIT (OUTPATIENT)
Dept: ORTHOPEDICS | Facility: CLINIC | Age: 84
End: 2019-08-26
Payer: MEDICARE

## 2019-08-26 VITALS
SYSTOLIC BLOOD PRESSURE: 147 MMHG | HEIGHT: 63 IN | DIASTOLIC BLOOD PRESSURE: 87 MMHG | BODY MASS INDEX: 22.15 KG/M2 | HEART RATE: 74 BPM | WEIGHT: 125 LBS

## 2019-08-26 DIAGNOSIS — M25.552 HIP PAIN, LEFT: Primary | ICD-10-CM

## 2019-08-26 DIAGNOSIS — S72.002D CLOSED FRACTURE OF NECK OF LEFT FEMUR WITH ROUTINE HEALING: Primary | ICD-10-CM

## 2019-08-26 PROCEDURE — 3288F PR FALLS RISK ASSESSMENT DOCUMENTED: ICD-10-PCS | Mod: CPTII,S$GLB,, | Performed by: ORTHOPAEDIC SURGERY

## 2019-08-26 PROCEDURE — 1100F PR PT FALLS ASSESS DOC 2+ FALLS/FALL W/INJURY/YR: ICD-10-PCS | Mod: CPTII,S$GLB,, | Performed by: ORTHOPAEDIC SURGERY

## 2019-08-26 PROCEDURE — 1100F PTFALLS ASSESS-DOCD GE2>/YR: CPT | Mod: CPTII,S$GLB,, | Performed by: ORTHOPAEDIC SURGERY

## 2019-08-26 PROCEDURE — 99213 OFFICE O/P EST LOW 20 MIN: CPT | Mod: S$GLB,,, | Performed by: ORTHOPAEDIC SURGERY

## 2019-08-26 PROCEDURE — 99999 PR PBB SHADOW E&M-EST. PATIENT-LVL III: ICD-10-PCS | Mod: PBBFAC,,, | Performed by: ORTHOPAEDIC SURGERY

## 2019-08-26 PROCEDURE — 3288F FALL RISK ASSESSMENT DOCD: CPT | Mod: CPTII,S$GLB,, | Performed by: ORTHOPAEDIC SURGERY

## 2019-08-26 PROCEDURE — 99213 PR OFFICE/OUTPT VISIT, EST, LEVL III, 20-29 MIN: ICD-10-PCS | Mod: S$GLB,,, | Performed by: ORTHOPAEDIC SURGERY

## 2019-08-26 PROCEDURE — 99999 PR PBB SHADOW E&M-EST. PATIENT-LVL III: CPT | Mod: PBBFAC,,, | Performed by: ORTHOPAEDIC SURGERY

## 2019-08-26 NOTE — PROGRESS NOTES
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 8/7/2018    Hip arthritis 3/1/2016    Homocysteinemia 8/7/2018    Hyperlipidemia     Hypertension     Normocytic normochromic anemia 6/29/2018    PE (pulmonary thromboembolism) 06/09/2018    Pneumonia of right lower lobe due to infectious organism 9/11/2017    Thyroid disease     hypothyroid       Past Surgical History:   Procedure Laterality Date    APPENDECTOMY      CHOLECYSTECTOMY      ESOPHAGOGASTRODUODENOSCOPY (EGD) N/A 10/25/2017    Performed by Fadi Flores [facility] at [facility] ENDO    FRACTURE SURGERY      right hip     HERNIA REPAIR      groin    HYSTERECTOMY      Insertion, Implantable Loop Recorder N/A 12/12/2018    Performed by Ashok Hebrert MD at [facility] CATH LAB    PINNING, HIP, PERCUTANEOUS Left 3/23/2019    Performed by Jorje Hamlin MD at [facility] OR    VARICOSE VEIN SURGERY         Current Outpatient Medications   Medication Sig    acetaminophen (TYLENOL) 325 MG tablet Take 2 tablets (650 mg total) by mouth every 4 (four) hours as needed. (Patient taking differently: Take 650 mg by mouth every 4 (four) hours as needed for Pain. )    albuterol-ipratropium (DUO-NEB) 2.5 mg-0.5 mg/3 mL nebulizer solution USE ONE VIAL IN NEBULIZER EVERY 6 HOURS WHILE AWAKE (Patient taking differently: USE ONE VIAL IN NEBULIZER EVERY 6 HOURS WHILE AWAKE PRN SOB)    amLODIPine (NORVASC) 5 MG tablet Take 1 tablet (5 mg total) by mouth once daily.    ascorbic acid (VITAMIN C) 500  MG tablet Take 500 mg by mouth once daily.      aspirin (ECOTRIN) 81 MG EC tablet Take 81 mg by mouth once daily.      budesonide (PULMICORT) 0.5 mg/2 mL nebulizer solution Take 2 mLs (0.5 mg total) by nebulization 2 (two) times daily. Controller    calcium carbonate (OS-TASIA) 500 mg calcium (1,250 mg) tablet Take 1 tablet by mouth 2 (two) times daily.      docusate sodium (COLACE) 100 MG capsule Take 1 capsule (100 mg total) by mouth 2 (two) times daily.    ELIQUIS 5 mg Tab TAKE 1 TABLET BY MOUTH TWICE DAILY    ferrous sulfate (FEOSOL) 325 mg (65 mg iron) Tab tablet Take 1 tablet (325 mg total) by mouth 2 (two) times daily with meals.    fish oil-omega-3 fatty acids 300-1,000 mg capsule Take 2 g by mouth every evening.     fluticasone (FLONASE) 50 mcg/actuation nasal spray 2 sprays by Each Nare route once daily.    FOLBIC 2.5-25-2 mg Tab TAKE 1 TABLET BY MOUTH ONCE DAILY    furosemide (LASIX) 20 MG tablet Take 1 tablet on Tuesdays and Thursdays.    gabapentin (NEURONTIN) 300 MG capsule TAKE ONE CAPSULE BY MOUTH IN THE EVENING    glipiZIDE (GLUCOTROL) 2.5 MG TR24 Take 1 tablet (2.5 mg total) by mouth daily with breakfast.    levothyroxine (SYNTHROID) 88 MCG tablet Take 1 tablet (88 mcg total) by mouth before breakfast.    magnesium oxide (MAG-OX) 400 mg tablet TAKE ONE TABLET BY MOUTH ONCE DAILY    multivitamin (THERAGRAN) tablet Take 1 tablet by mouth once daily.    mupirocin (BACTROBAN) 2 % ointment Apply topically 2 (two) times daily.    nitroGLYCERIN (NITROSTAT) 0.4 MG SL tablet Place 1 tablet (0.4 mg total) under the tongue every 5 (five) minutes as needed for Chest pain. As needed (Patient taking differently: Place 0.4 mg under the tongue every 5 (five) minutes as needed for Chest pain. Seek medical help if pain is not relieved after the third dose.)    predniSONE (DELTASONE) 20 MG tablet Take one daily for 3 days then repeat for bronchitis    simvastatin (ZOCOR) 40 MG tablet TAKE ONE TABLET  BY MOUTH ONCE DAILY IN THE EVENING    sodium polystyrene (KAYEXALATE) powder TK 15 GM PO AS A SINGLE DOSE TODAY UTD    vitamin D 1000 units Tab Take 1 tablet (1,000 Units total) by mouth once daily.     No current facility-administered medications for this visit.        Review of patient's allergies indicates:   Allergen Reactions    Carvedilol Other (See Comments)     Bradycardia and syncope    Boniva [ibandronate]     Codeine     Hydralazine analogues     Iodinated contrast- oral and iv dye Hives    Kionex [sodium polystyrene sulfonate] Diarrhea     From encounter 17 for diarrhea--not true allergy.    Lisinopril     Morphine        Family History   Problem Relation Age of Onset    Hypertension Mother     Heart disease Mother     Lung disease Father     Diabetes Sister     Diabetes Brother     Kidney disease Son        Social History     Socioeconomic History    Marital status: Legally      Spouse name: Not on file    Number of children: Not on file    Years of education: Not on file    Highest education level: Not on file   Occupational History    Not on file   Social Needs    Financial resource strain: Not very hard    Food insecurity:     Worry: Never true     Inability: Never true    Transportation needs:     Medical: No     Non-medical: No   Tobacco Use    Smoking status: Former Smoker     Packs/day: 0.35     Years: 44.00     Pack years: 15.40     Types: Cigarettes     Last attempt to quit: 2015     Years since quittin.3    Smokeless tobacco: Never Used    Tobacco comment: 2015   Substance and Sexual Activity    Alcohol use: No     Alcohol/week: 0.0 oz     Frequency: Never     Binge frequency: Never    Drug use: No    Sexual activity: Never   Lifestyle    Physical activity:     Days per week: 0 days     Minutes per session: 0 min    Stress: Only a little   Relationships    Social connections:     Talks on phone: More than three times a week     Gets  together: More than three times a week     Attends Sikhism service: Not on file     Active member of club or organization: No     Attends meetings of clubs or organizations: Never     Relationship status:    Other Topics Concern    Not on file   Social History Narrative    Not on file       Chief Complaint:   Chief Complaint   Patient presents with    Hip Pain     L hip 3wk f/u       Date of surgery:  March 23, 2019    History of present illness:  88-year-old female who underwent left closed reduction percutaneous pinning of a femoral neck fracture 3 months ago.  Patient had a fall on July 14, 2019.  She was seen at the ER.  She had a CT scan done.  Patient had pretty extreme pain along the buttock area down the leg all the way to her foot even.  I put her on a Medrol Dosepak.  The pain is gone again.      Review of Systems:    Musculoskeletal:  See HPI        Physical Examination:    Vital Signs:    Vitals:    08/26/19 1445   BP: (!) 147/87   Pulse: 74       Body mass index is 22.14 kg/m².    This a well-developed, well nourished patient in no acute distress.  They are alert and oriented and cooperative to examination.  Pt.  Walks with a walker.    Examination left hip shows well-healed surgical incision. No erythema or drainage.  Minimal swelling.  Mild pain with range of motion of the hip.  Has some hypersensitivity all the way down to the foot.    X-rays:  X-rays left hip are  reviewed which show percutaneous pinning of the left femoral neck.  No change compared to the last x-ray.    CT scan of the left thigh:Old internally fixed left femoral fracture with no acute fracture.  If strong or continue clinical consideration for such MRI could be considered for further evaluation      Assessment::  Status post CRPP left femoral neck fracture  Left sciatic nerve contusion    Plan:  I am glad she is doing so well. We will get her own Rollator walker.  Follow-up as needed.    This note was created using REJI  Modal voice recognition software that occasionally misinterpreted phrases or words.

## 2019-08-30 ENCOUNTER — TELEPHONE (OUTPATIENT)
Dept: HOME HEALTH SERVICES | Facility: HOSPITAL | Age: 84
End: 2019-08-30

## 2019-09-06 ENCOUNTER — LAB VISIT (OUTPATIENT)
Dept: LAB | Facility: HOSPITAL | Age: 84
End: 2019-09-06
Attending: INTERNAL MEDICINE
Payer: MEDICARE

## 2019-09-06 DIAGNOSIS — R79.83 HOMOCYSTEINEMIA: ICD-10-CM

## 2019-09-06 DIAGNOSIS — I26.99 OTHER ACUTE PULMONARY EMBOLISM WITHOUT ACUTE COR PULMONALE: ICD-10-CM

## 2019-09-06 DIAGNOSIS — I82.4Y2 DEEP VEIN THROMBOSIS (DVT) OF PROXIMAL VEIN OF LEFT LOWER EXTREMITY, UNSPECIFIED CHRONICITY: ICD-10-CM

## 2019-09-06 DIAGNOSIS — D53.9 MACROCYTIC ANEMIA: ICD-10-CM

## 2019-09-06 DIAGNOSIS — D50.0 IRON DEFICIENCY ANEMIA DUE TO CHRONIC BLOOD LOSS: ICD-10-CM

## 2019-09-06 DIAGNOSIS — Z86.711 HISTORY OF PULMONARY EMBOLISM: ICD-10-CM

## 2019-09-06 DIAGNOSIS — D63.8 ANEMIA, CHRONIC DISEASE: ICD-10-CM

## 2019-09-06 DIAGNOSIS — D63.1 ANEMIA, CHRONIC RENAL FAILURE, STAGE 3 (MODERATE): ICD-10-CM

## 2019-09-06 DIAGNOSIS — D64.9 NORMOCYTIC NORMOCHROMIC ANEMIA: ICD-10-CM

## 2019-09-06 DIAGNOSIS — D64.89 ANEMIA DUE TO MULTIPLE MECHANISMS: ICD-10-CM

## 2019-09-06 DIAGNOSIS — N18.30 ANEMIA, CHRONIC RENAL FAILURE, STAGE 3 (MODERATE): ICD-10-CM

## 2019-09-06 DIAGNOSIS — Z79.01 CHRONIC ANTICOAGULATION: ICD-10-CM

## 2019-09-06 DIAGNOSIS — Z15.89 HETEROZYGOUS MTHFR MUTATION C677T: ICD-10-CM

## 2019-09-06 LAB
ALBUMIN SERPL BCP-MCNC: 3.4 G/DL (ref 3.5–5.2)
ALP SERPL-CCNC: 76 U/L (ref 55–135)
ALT SERPL W/O P-5'-P-CCNC: 15 U/L (ref 10–44)
ANION GAP SERPL CALC-SCNC: 11 MMOL/L (ref 8–16)
AST SERPL-CCNC: 20 U/L (ref 10–40)
BASOPHILS # BLD AUTO: 0.03 K/UL (ref 0–0.2)
BASOPHILS NFR BLD: 0.3 % (ref 0–1.9)
BILIRUB SERPL-MCNC: 0.3 MG/DL (ref 0.1–1)
BUN SERPL-MCNC: 57 MG/DL (ref 8–23)
CALCIUM SERPL-MCNC: 9.2 MG/DL (ref 8.7–10.5)
CHLORIDE SERPL-SCNC: 106 MMOL/L (ref 95–110)
CO2 SERPL-SCNC: 24 MMOL/L (ref 23–29)
CREAT SERPL-MCNC: 1.7 MG/DL (ref 0.5–1.4)
DIFFERENTIAL METHOD: ABNORMAL
EOSINOPHIL # BLD AUTO: 0.2 K/UL (ref 0–0.5)
EOSINOPHIL NFR BLD: 2.3 % (ref 0–8)
ERYTHROCYTE [DISTWIDTH] IN BLOOD BY AUTOMATED COUNT: 13.7 % (ref 11.5–14.5)
EST. GFR  (AFRICAN AMERICAN): 30.4 ML/MIN/1.73 M^2
EST. GFR  (NON AFRICAN AMERICAN): 26.4 ML/MIN/1.73 M^2
FERRITIN SERPL-MCNC: 160 NG/ML (ref 20–300)
GLUCOSE SERPL-MCNC: 113 MG/DL (ref 70–110)
HCT VFR BLD AUTO: 28 % (ref 37–48.5)
HGB BLD-MCNC: 9 G/DL (ref 12–16)
IMM GRANULOCYTES # BLD AUTO: 0.04 K/UL (ref 0–0.04)
IMM GRANULOCYTES NFR BLD AUTO: 0.5 % (ref 0–0.5)
IRON SERPL-MCNC: 53 UG/DL (ref 30–160)
LYMPHOCYTES # BLD AUTO: 1.9 K/UL (ref 1–4.8)
LYMPHOCYTES NFR BLD: 22.2 % (ref 18–48)
MCH RBC QN AUTO: 31.9 PG (ref 27–31)
MCHC RBC AUTO-ENTMCNC: 32.1 G/DL (ref 32–36)
MCV RBC AUTO: 99 FL (ref 82–98)
MONOCYTES # BLD AUTO: 1 K/UL (ref 0.3–1)
MONOCYTES NFR BLD: 11 % (ref 4–15)
NEUTROPHILS # BLD AUTO: 5.6 K/UL (ref 1.8–7.7)
NEUTROPHILS NFR BLD: 63.7 % (ref 38–73)
NRBC BLD-RTO: 0 /100 WBC
PLATELET # BLD AUTO: 178 K/UL (ref 150–350)
PMV BLD AUTO: 11.7 FL (ref 9.2–12.9)
POTASSIUM SERPL-SCNC: 4 MMOL/L (ref 3.5–5.1)
PROT SERPL-MCNC: 6.3 G/DL (ref 6–8.4)
RBC # BLD AUTO: 2.82 M/UL (ref 4–5.4)
SATURATED IRON: 18 % (ref 20–50)
SODIUM SERPL-SCNC: 141 MMOL/L (ref 136–145)
TOTAL IRON BINDING CAPACITY: 302 UG/DL (ref 250–450)
TRANSFERRIN SERPL-MCNC: 204 MG/DL (ref 200–375)
WBC # BLD AUTO: 8.75 K/UL (ref 3.9–12.7)

## 2019-09-06 PROCEDURE — 36415 COLL VENOUS BLD VENIPUNCTURE: CPT | Mod: PO

## 2019-09-06 PROCEDURE — 83540 ASSAY OF IRON: CPT

## 2019-09-06 PROCEDURE — 80053 COMPREHEN METABOLIC PANEL: CPT

## 2019-09-06 PROCEDURE — 85025 COMPLETE CBC W/AUTO DIFF WBC: CPT

## 2019-09-06 PROCEDURE — 82728 ASSAY OF FERRITIN: CPT

## 2019-09-13 ENCOUNTER — OFFICE VISIT (OUTPATIENT)
Dept: FAMILY MEDICINE | Facility: CLINIC | Age: 84
End: 2019-09-13
Payer: MEDICARE

## 2019-09-13 VITALS
SYSTOLIC BLOOD PRESSURE: 136 MMHG | DIASTOLIC BLOOD PRESSURE: 64 MMHG | WEIGHT: 126.31 LBS | TEMPERATURE: 99 F | HEIGHT: 63 IN | HEART RATE: 69 BPM | BODY MASS INDEX: 22.38 KG/M2 | OXYGEN SATURATION: 97 %

## 2019-09-13 DIAGNOSIS — E11.59 HYPERTENSION ASSOCIATED WITH DIABETES: ICD-10-CM

## 2019-09-13 DIAGNOSIS — E11.22 TYPE 2 DIABETES MELLITUS WITH STAGE 3 CHRONIC KIDNEY DISEASE, WITHOUT LONG-TERM CURRENT USE OF INSULIN: Primary | ICD-10-CM

## 2019-09-13 DIAGNOSIS — J44.89 ASTHMA-COPD OVERLAP SYNDROME: ICD-10-CM

## 2019-09-13 DIAGNOSIS — N18.30 TYPE 2 DIABETES MELLITUS WITH STAGE 3 CHRONIC KIDNEY DISEASE, WITHOUT LONG-TERM CURRENT USE OF INSULIN: Primary | ICD-10-CM

## 2019-09-13 DIAGNOSIS — I15.2 HYPERTENSION ASSOCIATED WITH DIABETES: ICD-10-CM

## 2019-09-13 DIAGNOSIS — Z23 FLU VACCINE NEED: ICD-10-CM

## 2019-09-13 DIAGNOSIS — Z99.81 ON HOME OXYGEN THERAPY: ICD-10-CM

## 2019-09-13 PROCEDURE — 99999 PR PBB SHADOW E&M-EST. PATIENT-LVL IV: ICD-10-PCS | Mod: PBBFAC,,, | Performed by: NURSE PRACTITIONER

## 2019-09-13 PROCEDURE — 99213 PR OFFICE/OUTPT VISIT, EST, LEVL III, 20-29 MIN: ICD-10-PCS | Mod: 25,S$GLB,, | Performed by: NURSE PRACTITIONER

## 2019-09-13 PROCEDURE — 90662 IIV NO PRSV INCREASED AG IM: CPT | Mod: S$GLB,,, | Performed by: NURSE PRACTITIONER

## 2019-09-13 PROCEDURE — G0008 FLU VACCINE - HIGH DOSE (65+) PRESERVATIVE FREE IM: ICD-10-PCS | Mod: S$GLB,,, | Performed by: NURSE PRACTITIONER

## 2019-09-13 PROCEDURE — 1101F PR PT FALLS ASSESS DOC 0-1 FALLS W/OUT INJ PAST YR: ICD-10-PCS | Mod: CPTII,S$GLB,, | Performed by: NURSE PRACTITIONER

## 2019-09-13 PROCEDURE — G0008 ADMIN INFLUENZA VIRUS VAC: HCPCS | Mod: S$GLB,,, | Performed by: NURSE PRACTITIONER

## 2019-09-13 PROCEDURE — 90662 FLU VACCINE - HIGH DOSE (65+) PRESERVATIVE FREE IM: ICD-10-PCS | Mod: S$GLB,,, | Performed by: NURSE PRACTITIONER

## 2019-09-13 PROCEDURE — 99213 OFFICE O/P EST LOW 20 MIN: CPT | Mod: 25,S$GLB,, | Performed by: NURSE PRACTITIONER

## 2019-09-13 PROCEDURE — 99999 PR PBB SHADOW E&M-EST. PATIENT-LVL IV: CPT | Mod: PBBFAC,,, | Performed by: NURSE PRACTITIONER

## 2019-09-13 PROCEDURE — 1101F PT FALLS ASSESS-DOCD LE1/YR: CPT | Mod: CPTII,S$GLB,, | Performed by: NURSE PRACTITIONER

## 2019-09-13 RX ORDER — MONTELUKAST SODIUM 10 MG/1
TABLET ORAL
COMMUNITY
Start: 2019-07-31 | End: 2019-11-29 | Stop reason: SDUPTHER

## 2019-09-13 RX ORDER — SERTRALINE HYDROCHLORIDE 25 MG/1
TABLET, FILM COATED ORAL
COMMUNITY
Start: 2019-08-02 | End: 2019-11-29 | Stop reason: SDUPTHER

## 2019-09-13 NOTE — PROGRESS NOTES
Patient verified by name and . Patient received high dose influenza vaccine in right deltoid. Patient tolerated injection well. Patient advised to wait in clinic for 15 minutes in case of adverse reactions. Patient demonstrated understanding.

## 2019-09-13 NOTE — PROGRESS NOTES
Subjective:       Patient ID: Blaire Cage is a 89 y.o. female.    Chief Complaint: Follow-up    Ms. Cage presents today to discuss diabetes control. Has log today. No episodes of hypoglycemia. Tolerating medication without side effects. Vitals stable.     At her last visit, she and family had some questions about home oxygen and how often she should be wearing this. Touched based with pulmonology, patient was discharged in 2015 from the hospital with script for home O2. Recommended six minute walk to to assess her current need.    Patient Active Problem List   Diagnosis    Diabetic neuropathy, type II diabetes mellitus    CKD (chronic kidney disease), stage III, eGFR 39, progressive 31    Hypothyroidism    Hypertension associated with diabetes    Hyperlipidemia associated with type 2 diabetes mellitus    Type 2 diabetes mellitus with stage 3 chronic kidney disease    Right carotid bruit    COPD mixed type    Anxiety    Tobacco dependence    Abdominal aortic aneurysm without rupture    Hip arthritis    Degenerative spinal arthritis    Osteoporosis    Left ventricular diastolic dysfunction with preserved systolic function    Hypertensive left ventricular hypertrophy, without heart failure    Smoker, quit 5/2016, 25 pck-years    Abdominal obesity    Absolute anemia    Body mass index (BMI) 23.0-23.9, adult, today 24.1    Iron deficiency anemia due to chronic blood loss    Proteinuria due to type 2 diabetes mellitus    History of syncope in childhood    Prerenal azotemia    Drug-induced constipation    COPD with acute exacerbation    Asthmatic bronchitis with acute exacerbation    Shortness of breath    Controlled type 2 diabetes mellitus, without long-term current use of insulin    Acute pulmonary embolism    SOB (shortness of breath)    Asthma-COPD overlap syndrome    Eosinophilic asthma    Moderate malnutrition    Type 2 diabetes mellitus with kidney complication, without  long-term current use of insulin    Chronic anticoagulation    Constipation    DVT (deep venous thrombosis)    History of pulmonary embolism    Anemia due to multiple mechanisms    Anemia, chronic disease    Normocytic normochromic anemia    Anemia, chronic renal failure, stage 3 (moderate)    Homocysteinemia    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    Hyperkalemia    Diastolic CHF, chronic    Anemia of chronic disease    Kidney stones    Bradycardia    Macrocytic anemia    Orthostatic hypotension    Closed left hip fracture    Hip pain, left    On home oxygen therapy       Current Outpatient Medications:     acetaminophen (TYLENOL) 325 MG tablet, Take 2 tablets (650 mg total) by mouth every 4 (four) hours as needed. (Patient taking differently: Take 650 mg by mouth every 4 (four) hours as needed for Pain. ), Disp: , Rfl: 0    albuterol-ipratropium (DUO-NEB) 2.5 mg-0.5 mg/3 mL nebulizer solution, USE ONE VIAL IN NEBULIZER EVERY 6 HOURS WHILE AWAKE (Patient taking differently: USE ONE VIAL IN NEBULIZER EVERY 6 HOURS WHILE AWAKE PRN SOB), Disp: 120 vial, Rfl: 5    amLODIPine (NORVASC) 5 MG tablet, Take 1 tablet (5 mg total) by mouth once daily., Disp: 90 tablet, Rfl: 3    ascorbic acid (VITAMIN C) 500 MG tablet, Take 500 mg by mouth once daily.  , Disp: , Rfl:     aspirin (ECOTRIN) 81 MG EC tablet, Take 81 mg by mouth once daily.  , Disp: , Rfl:     budesonide (PULMICORT) 0.5 mg/2 mL nebulizer solution, Take 2 mLs (0.5 mg total) by nebulization 2 (two) times daily. Controller, Disp: 120 mL, Rfl: 5    calcium carbonate (OS-TASIA) 500 mg calcium (1,250 mg) tablet, Take 1 tablet by mouth 2 (two) times daily.  , Disp: , Rfl:     docusate sodium (COLACE) 100 MG capsule, Take 1 capsule (100 mg total) by mouth 2 (two) times daily., Disp: 90 capsule, Rfl: 0    ELIQUIS 5 mg Tab, TAKE 1 TABLET BY MOUTH TWICE DAILY, Disp: 60 tablet, Rfl: 5    ferrous sulfate (FEOSOL) 325 mg (65 mg iron) Tab tablet, Take 1  tablet (325 mg total) by mouth 2 (two) times daily with meals., Disp: 180 tablet, Rfl: 3    fish oil-omega-3 fatty acids 300-1,000 mg capsule, Take 2 g by mouth every evening. , Disp: , Rfl:     fluticasone (FLONASE) 50 mcg/actuation nasal spray, 2 sprays by Each Nare route once daily., Disp: 48 g, Rfl: 3    FOLBIC 2.5-25-2 mg Tab, TAKE 1 TABLET BY MOUTH ONCE DAILY, Disp: 30 tablet, Rfl: 6    furosemide (LASIX) 20 MG tablet, Take 1 tablet on Tuesdays and Thursdays., Disp: 24 tablet, Rfl: 1    gabapentin (NEURONTIN) 300 MG capsule, TAKE ONE CAPSULE BY MOUTH IN THE EVENING, Disp: 90 capsule, Rfl: 3    glipiZIDE (GLUCOTROL) 2.5 MG TR24, Take 1 tablet (2.5 mg total) by mouth daily with breakfast., Disp: 90 tablet, Rfl: 3    levothyroxine (SYNTHROID) 88 MCG tablet, Take 1 tablet (88 mcg total) by mouth before breakfast., Disp: 90 tablet, Rfl: 3    magnesium oxide (MAG-OX) 400 mg tablet, TAKE ONE TABLET BY MOUTH ONCE DAILY, Disp: 90 tablet, Rfl: 3    montelukast (SINGULAIR) 10 mg tablet, , Disp: , Rfl:     multivitamin (THERAGRAN) tablet, Take 1 tablet by mouth once daily., Disp: , Rfl:     mupirocin (BACTROBAN) 2 % ointment, Apply topically 2 (two) times daily., Disp: 30 g, Rfl: 1    nitroGLYCERIN (NITROSTAT) 0.4 MG SL tablet, Place 1 tablet (0.4 mg total) under the tongue every 5 (five) minutes as needed for Chest pain. As needed (Patient taking differently: Place 0.4 mg under the tongue every 5 (five) minutes as needed for Chest pain. Seek medical help if pain is not relieved after the third dose.), Disp: 30 tablet, Rfl: 3    predniSONE (DELTASONE) 20 MG tablet, Take one daily for 3 days then repeat for bronchitis, Disp: 15 tablet, Rfl: 0    sertraline (ZOLOFT) 25 MG tablet, , Disp: , Rfl:     simvastatin (ZOCOR) 40 MG tablet, TAKE ONE TABLET BY MOUTH ONCE DAILY IN THE EVENING, Disp: 90 tablet, Rfl: 0    sodium polystyrene (KAYEXALATE) powder, TK 15 GM PO AS A SINGLE DOSE TODAY UTD, Disp: , Rfl: 0     vitamin D 1000 units Tab, Take 1 tablet (1,000 Units total) by mouth once daily., Disp: 90 tablet, Rfl: 3        Review of Systems   Constitutional: Negative for activity change and unexpected weight change.   HENT: Negative for hearing loss, rhinorrhea and trouble swallowing.    Eyes: Negative for discharge and visual disturbance.   Respiratory: Negative for chest tightness and wheezing.    Cardiovascular: Negative for chest pain and palpitations.   Gastrointestinal: Positive for constipation (controlled on current medications ). Negative for blood in stool, diarrhea and vomiting.   Endocrine: Negative for polydipsia and polyuria.   Genitourinary: Negative for difficulty urinating, dysuria, hematuria and menstrual problem.   Musculoskeletal: Negative for arthralgias (greatly improved since PT), joint swelling and neck pain.   Neurological: Negative for weakness and headaches.   Psychiatric/Behavioral: Negative for confusion and dysphoric mood.       Objective:      Physical Exam   Constitutional: She is oriented to person, place, and time. She appears well-developed and well-nourished. No distress.   Cardiovascular: Normal rate, regular rhythm and normal heart sounds.   Pulmonary/Chest: Effort normal and breath sounds normal.   Musculoskeletal: She exhibits no edema.   Ambulating well with walker   Neurological: She is alert and oriented to person, place, and time.   Skin: Skin is warm and dry.   Psychiatric: She has a normal mood and affect.   Nursing note and vitals reviewed.      Assessment:       1. Type 2 diabetes mellitus with stage 3 chronic kidney disease, without long-term current use of insulin    2. Asthma-COPD overlap syndrome    3. On home oxygen therapy    4. Hypertension associated with diabetes        Plan:       Blaire was seen today for follow-up.    Diagnoses and all orders for this visit:    Type 2 diabetes mellitus with stage 3 chronic kidney disease, without long-term current use of  insulin  Controlled on current medications  Continue home monitoring, notify clinic if readings below 85 (avoid hypoglycemia given age)    Asthma-COPD overlap syndrome  -     Six Minute Walk Test to qualify for Home Oxygen; Future    On home oxygen therapy  -     Six Minute Walk Test to qualify for Home Oxygen; Future  Check to evaluate need for home oxygen    Hypertension associated with diabetes  Controlled on current medications

## 2019-09-17 ENCOUNTER — LAB VISIT (OUTPATIENT)
Dept: LAB | Facility: HOSPITAL | Age: 84
End: 2019-09-17
Attending: INTERNAL MEDICINE
Payer: MEDICARE

## 2019-09-17 DIAGNOSIS — D63.1 ANEMIA OF CHRONIC RENAL FAILURE: ICD-10-CM

## 2019-09-17 DIAGNOSIS — T81.718A IATROGENIC PULMONARY EMBOLISM AND INFARCTION: ICD-10-CM

## 2019-09-17 DIAGNOSIS — N17.9 ACUTE KIDNEY FAILURE, UNSPECIFIED: Primary | ICD-10-CM

## 2019-09-17 DIAGNOSIS — N18.4 CHRONIC KIDNEY DISEASE, STAGE IV (SEVERE): ICD-10-CM

## 2019-09-17 DIAGNOSIS — M81.0 SENILE OSTEOPOROSIS: ICD-10-CM

## 2019-09-17 DIAGNOSIS — E11.9 DIABETES MELLITUS WITHOUT COMPLICATION: ICD-10-CM

## 2019-09-17 DIAGNOSIS — I26.99 IATROGENIC PULMONARY EMBOLISM AND INFARCTION: ICD-10-CM

## 2019-09-17 DIAGNOSIS — R60.9 EDEMA: ICD-10-CM

## 2019-09-17 DIAGNOSIS — E87.5 HYPERPOTASSEMIA: ICD-10-CM

## 2019-09-17 DIAGNOSIS — D64.9 ANEMIA, UNSPECIFIED: ICD-10-CM

## 2019-09-17 DIAGNOSIS — N25.81 SECONDARY HYPERPARATHYROIDISM OF RENAL ORIGIN: ICD-10-CM

## 2019-09-17 DIAGNOSIS — N18.9 ANEMIA OF CHRONIC RENAL FAILURE: ICD-10-CM

## 2019-09-17 DIAGNOSIS — N39.0 URINARY TRACT INFECTION, SITE NOT SPECIFIED: ICD-10-CM

## 2019-09-17 DIAGNOSIS — E86.9 VOLUME DEPLETION, UNSPECIFIED: ICD-10-CM

## 2019-09-17 DIAGNOSIS — I10 ESSENTIAL HYPERTENSION, MALIGNANT: ICD-10-CM

## 2019-09-17 DIAGNOSIS — N20.0 URIC ACID NEPHROLITHIASIS: ICD-10-CM

## 2019-09-17 LAB
BASOPHILS # BLD AUTO: 0.02 K/UL (ref 0–0.2)
BASOPHILS NFR BLD: 0.3 % (ref 0–1.9)
DIFFERENTIAL METHOD: ABNORMAL
EOSINOPHIL # BLD AUTO: 0 K/UL (ref 0–0.5)
EOSINOPHIL NFR BLD: 0.1 % (ref 0–8)
ERYTHROCYTE [DISTWIDTH] IN BLOOD BY AUTOMATED COUNT: 13.7 % (ref 11.5–14.5)
HCT VFR BLD AUTO: 28.1 % (ref 37–48.5)
HGB BLD-MCNC: 8.8 G/DL (ref 12–16)
IMM GRANULOCYTES # BLD AUTO: 0.04 K/UL (ref 0–0.04)
IMM GRANULOCYTES NFR BLD AUTO: 0.5 % (ref 0–0.5)
LYMPHOCYTES # BLD AUTO: 0.7 K/UL (ref 1–4.8)
LYMPHOCYTES NFR BLD: 9.4 % (ref 18–48)
MCH RBC QN AUTO: 32.1 PG (ref 27–31)
MCHC RBC AUTO-ENTMCNC: 31.3 G/DL (ref 32–36)
MCV RBC AUTO: 103 FL (ref 82–98)
MONOCYTES # BLD AUTO: 0.2 K/UL (ref 0.3–1)
MONOCYTES NFR BLD: 3.1 % (ref 4–15)
NEUTROPHILS # BLD AUTO: 6.7 K/UL (ref 1.8–7.7)
NEUTROPHILS NFR BLD: 86.6 % (ref 38–73)
NRBC BLD-RTO: 0 /100 WBC
PLATELET # BLD AUTO: 174 K/UL (ref 150–350)
PMV BLD AUTO: 11.9 FL (ref 9.2–12.9)
RBC # BLD AUTO: 2.74 M/UL (ref 4–5.4)
WBC # BLD AUTO: 7.76 K/UL (ref 3.9–12.7)

## 2019-09-17 PROCEDURE — 84550 ASSAY OF BLOOD/URIC ACID: CPT

## 2019-09-17 PROCEDURE — 82728 ASSAY OF FERRITIN: CPT

## 2019-09-17 PROCEDURE — 85025 COMPLETE CBC W/AUTO DIFF WBC: CPT

## 2019-09-17 PROCEDURE — 83735 ASSAY OF MAGNESIUM: CPT

## 2019-09-17 PROCEDURE — 80069 RENAL FUNCTION PANEL: CPT

## 2019-09-17 PROCEDURE — 83970 ASSAY OF PARATHORMONE: CPT

## 2019-09-17 PROCEDURE — 83540 ASSAY OF IRON: CPT

## 2019-09-17 PROCEDURE — 36415 COLL VENOUS BLD VENIPUNCTURE: CPT | Mod: PO

## 2019-09-18 ENCOUNTER — TELEPHONE (OUTPATIENT)
Dept: HOME HEALTH SERVICES | Facility: HOSPITAL | Age: 84
End: 2019-09-18

## 2019-09-18 LAB
ALBUMIN SERPL BCP-MCNC: 3.4 G/DL (ref 3.5–5.2)
ANION GAP SERPL CALC-SCNC: 11 MMOL/L (ref 8–16)
BUN SERPL-MCNC: 63 MG/DL (ref 8–23)
CALCIUM SERPL-MCNC: 8.7 MG/DL (ref 8.7–10.5)
CHLORIDE SERPL-SCNC: 103 MMOL/L (ref 95–110)
CO2 SERPL-SCNC: 22 MMOL/L (ref 23–29)
CREAT SERPL-MCNC: 1.6 MG/DL (ref 0.5–1.4)
EST. GFR  (AFRICAN AMERICAN): 32.7 ML/MIN/1.73 M^2
EST. GFR  (NON AFRICAN AMERICAN): 28.4 ML/MIN/1.73 M^2
FERRITIN SERPL-MCNC: 165 NG/ML (ref 20–300)
GLUCOSE SERPL-MCNC: 221 MG/DL (ref 70–110)
IRON SERPL-MCNC: 67 UG/DL (ref 30–160)
MAGNESIUM SERPL-MCNC: 2.2 MG/DL (ref 1.6–2.6)
PHOSPHATE SERPL-MCNC: 2.9 MG/DL (ref 2.7–4.5)
PHOSPHATE SERPL-MCNC: 2.9 MG/DL (ref 2.7–4.5)
POTASSIUM SERPL-SCNC: 4.9 MMOL/L (ref 3.5–5.1)
PTH-INTACT SERPL-MCNC: 72 PG/ML (ref 9–77)
SATURATED IRON: 22 % (ref 20–50)
SODIUM SERPL-SCNC: 136 MMOL/L (ref 136–145)
TOTAL IRON BINDING CAPACITY: 311 UG/DL (ref 250–450)
TRANSFERRIN SERPL-MCNC: 210 MG/DL (ref 200–375)
URATE SERPL-MCNC: 8.1 MG/DL (ref 2.4–5.7)

## 2019-09-25 RX ORDER — LANOLIN ALCOHOL/MO/W.PET/CERES
CREAM (GRAM) TOPICAL
Qty: 90 TABLET | Refills: 3 | Status: SHIPPED | OUTPATIENT
Start: 2019-09-25 | End: 2019-11-29 | Stop reason: SDUPTHER

## 2019-09-26 ENCOUNTER — HOSPITAL ENCOUNTER (EMERGENCY)
Facility: HOSPITAL | Age: 84
Discharge: HOME | End: 2019-09-26
Attending: EMERGENCY MEDICINE
Payer: MEDICARE

## 2019-09-26 VITALS
HEART RATE: 67 BPM | TEMPERATURE: 98 F | OXYGEN SATURATION: 96 % | WEIGHT: 127 LBS | SYSTOLIC BLOOD PRESSURE: 122 MMHG | DIASTOLIC BLOOD PRESSURE: 58 MMHG | RESPIRATION RATE: 18 BRPM | BODY MASS INDEX: 23.37 KG/M2 | HEIGHT: 62 IN

## 2019-09-26 DIAGNOSIS — E78.5 HYPERLIPIDEMIA ASSOCIATED WITH TYPE 2 DIABETES MELLITUS: Primary | ICD-10-CM

## 2019-09-26 DIAGNOSIS — M54.6 ACUTE LEFT-SIDED THORACIC BACK PAIN: ICD-10-CM

## 2019-09-26 DIAGNOSIS — N39.0 URINARY TRACT INFECTION WITHOUT HEMATURIA, SITE UNSPECIFIED: Primary | ICD-10-CM

## 2019-09-26 DIAGNOSIS — E11.69 HYPERLIPIDEMIA ASSOCIATED WITH TYPE 2 DIABETES MELLITUS: Primary | ICD-10-CM

## 2019-09-26 LAB
ALBUMIN SERPL BCP-MCNC: 3.5 G/DL (ref 3.5–5.2)
ALP SERPL-CCNC: 61 U/L (ref 55–135)
ALT SERPL W/O P-5'-P-CCNC: 18 U/L (ref 10–44)
ANION GAP SERPL CALC-SCNC: 8 MMOL/L (ref 8–16)
AST SERPL-CCNC: 24 U/L (ref 10–40)
BACTERIA #/AREA URNS HPF: ABNORMAL /HPF
BASOPHILS # BLD AUTO: 0.03 K/UL (ref 0–0.2)
BASOPHILS NFR BLD: 0.4 % (ref 0–1.9)
BILIRUB SERPL-MCNC: 0.3 MG/DL (ref 0.1–1)
BILIRUB UR QL STRIP: NEGATIVE
BUN SERPL-MCNC: 36 MG/DL (ref 8–23)
CALCIUM SERPL-MCNC: 9.4 MG/DL (ref 8.7–10.5)
CHLORIDE SERPL-SCNC: 107 MMOL/L (ref 95–110)
CLARITY UR: CLEAR
CO2 SERPL-SCNC: 27 MMOL/L (ref 23–29)
COLOR UR: YELLOW
CREAT SERPL-MCNC: 1.4 MG/DL (ref 0.5–1.4)
DIFFERENTIAL METHOD: ABNORMAL
EOSINOPHIL # BLD AUTO: 0.4 K/UL (ref 0–0.5)
EOSINOPHIL NFR BLD: 5.2 % (ref 0–8)
ERYTHROCYTE [DISTWIDTH] IN BLOOD BY AUTOMATED COUNT: 13.2 % (ref 11.5–14.5)
EST. GFR  (AFRICAN AMERICAN): 38 ML/MIN/1.73 M^2
EST. GFR  (NON AFRICAN AMERICAN): 33 ML/MIN/1.73 M^2
GLUCOSE SERPL-MCNC: 77 MG/DL (ref 70–110)
GLUCOSE UR QL STRIP: NEGATIVE
HCT VFR BLD AUTO: 29.9 % (ref 37–48.5)
HGB BLD-MCNC: 9.6 G/DL (ref 12–16)
HGB UR QL STRIP: NEGATIVE
IMM GRANULOCYTES # BLD AUTO: 0.02 K/UL (ref 0–0.04)
KETONES UR QL STRIP: NEGATIVE
LEUKOCYTE ESTERASE UR QL STRIP: ABNORMAL
LYMPHOCYTES # BLD AUTO: 1.7 K/UL (ref 1–4.8)
LYMPHOCYTES NFR BLD: 24.8 % (ref 18–48)
MCH RBC QN AUTO: 32.1 PG (ref 27–31)
MCHC RBC AUTO-ENTMCNC: 32.1 G/DL (ref 32–36)
MCV RBC AUTO: 100 FL (ref 82–98)
MICROSCOPIC COMMENT: ABNORMAL
MONOCYTES # BLD AUTO: 0.7 K/UL (ref 0.3–1)
MONOCYTES NFR BLD: 11 % (ref 4–15)
NEUTROPHILS # BLD AUTO: 3.9 K/UL (ref 1.8–7.7)
NEUTROPHILS NFR BLD: 58.3 % (ref 38–73)
NITRITE UR QL STRIP: POSITIVE
NRBC BLD-RTO: 0 /100 WBC
PH UR STRIP: 6 [PH] (ref 5–8)
PLATELET # BLD AUTO: 165 K/UL (ref 150–350)
PMV BLD AUTO: 10.5 FL (ref 9.2–12.9)
POTASSIUM SERPL-SCNC: 5 MMOL/L (ref 3.5–5.1)
PROT SERPL-MCNC: 6.5 G/DL (ref 6–8.4)
PROT UR QL STRIP: ABNORMAL
RBC # BLD AUTO: 2.99 M/UL (ref 4–5.4)
RBC #/AREA URNS HPF: 0 /HPF (ref 0–4)
SODIUM SERPL-SCNC: 142 MMOL/L (ref 136–145)
SP GR UR STRIP: 1.01 (ref 1–1.03)
SQUAMOUS #/AREA URNS HPF: 1 /HPF
URN SPEC COLLECT METH UR: ABNORMAL
UROBILINOGEN UR STRIP-ACNC: NEGATIVE EU/DL
WBC # BLD AUTO: 6.73 K/UL (ref 3.9–12.7)
WBC #/AREA URNS HPF: 45 /HPF (ref 0–5)

## 2019-09-26 PROCEDURE — 51701 INSERT BLADDER CATHETER: CPT

## 2019-09-26 PROCEDURE — 63600175 PHARM REV CODE 636 W HCPCS: Performed by: EMERGENCY MEDICINE

## 2019-09-26 PROCEDURE — 87186 SC STD MICRODIL/AGAR DIL: CPT

## 2019-09-26 PROCEDURE — 87088 URINE BACTERIA CULTURE: CPT

## 2019-09-26 PROCEDURE — 25000003 PHARM REV CODE 250: Performed by: EMERGENCY MEDICINE

## 2019-09-26 PROCEDURE — 87077 CULTURE AEROBIC IDENTIFY: CPT

## 2019-09-26 PROCEDURE — 99284 EMERGENCY DEPT VISIT MOD MDM: CPT | Mod: 25

## 2019-09-26 PROCEDURE — 87086 URINE CULTURE/COLONY COUNT: CPT

## 2019-09-26 PROCEDURE — 85025 COMPLETE CBC W/AUTO DIFF WBC: CPT

## 2019-09-26 PROCEDURE — 81000 URINALYSIS NONAUTO W/SCOPE: CPT

## 2019-09-26 PROCEDURE — 96365 THER/PROPH/DIAG IV INF INIT: CPT

## 2019-09-26 PROCEDURE — 36415 COLL VENOUS BLD VENIPUNCTURE: CPT

## 2019-09-26 PROCEDURE — 80053 COMPREHEN METABOLIC PANEL: CPT

## 2019-09-26 RX ORDER — ACETAMINOPHEN 500 MG
1000 TABLET ORAL
Status: COMPLETED | OUTPATIENT
Start: 2019-09-26 | End: 2019-09-26

## 2019-09-26 RX ORDER — HYDROCODONE BITARTRATE AND ACETAMINOPHEN 5; 325 MG/1; MG/1
1 TABLET ORAL
Status: COMPLETED | OUTPATIENT
Start: 2019-09-26 | End: 2019-09-26

## 2019-09-26 RX ADMIN — ACETAMINOPHEN 1000 MG: 500 TABLET ORAL at 12:09

## 2019-09-26 RX ADMIN — CEFTRIAXONE 1 G: 1 INJECTION, SOLUTION INTRAVENOUS at 02:09

## 2019-09-26 RX ADMIN — HYDROCODONE BITARTRATE AND ACETAMINOPHEN 1 TABLET: 5; 325 TABLET ORAL at 03:09

## 2019-09-26 NOTE — ED PROVIDER NOTES
"Encounter Date: 9/26/2019    SCRIBE #1 NOTE: I, Rogelio Fajardo, britton scribing for, and in the presence of, Tayo Guadalupe MD.       History     Chief Complaint   Patient presents with    Back Pain     atraumatic thoracic back pain (vs Lt flank) - reports "tiredness" in general, afebrile but recent antibiotic Rx for UTI not yet filled        Time seen by provider: 12:13 PM on 09/26/2019    Blaire Cage is a 89 y.o. female who presents to the ED with an onset of constant, worsening left-sided flank pain without radiation for 3 days. Pt states that she had an appt with her kidney doctor yesterday, who prescribed her abx for a bladder infection. She has not started medication yet. Pt reports that her pain worsens on deep breath as well as when laying on her right side.  Improves with certain positions.  She denies any other sx at this time, including fever, SOB different from baseline, leg swelling, abd pain, N/V/D, right-sided flank pain, or leg pain. No PMHx of sciatica. PSHx of appendectomy and cholecystectomy. Multiple known drug allergies.    The history is provided by the patient.     Review of patient's allergies indicates:   Allergen Reactions    Carvedilol Other (See Comments)     Bradycardia and syncope    Boniva [ibandronate]     Codeine     Hydralazine analogues     Iodinated contrast media Hives    Kionex [sodium polystyrene sulfonate] Diarrhea     From encounter 12/11/17 for diarrhea--not true allergy.    Lisinopril     Morphine      Past Medical History:   Diagnosis Date    Anemia due to multiple mechanisms 6/29/2018    Anemia, chronic disease 6/29/2018    Anemia, chronic renal failure, stage 3 (moderate) 6/29/2018    Anticoagulant long-term use     aspirin    Aortic aneurysm     CHF (congestive heart failure)     COPD (chronic obstructive pulmonary disease)     COPD with acute exacerbation 1/9/2015    Diabetes mellitus type II     DVT (deep venous thrombosis) 06/09/2018    Encounter " for blood transfusion     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 2018    Hip arthritis 3/1/2016    Homocysteinemia 2018    Hyperlipidemia     Hypertension     Normocytic normochromic anemia 2018    PE (pulmonary thromboembolism) 2018    Pneumonia of right lower lobe due to infectious organism 2017    Thyroid disease     hypothyroid     Past Surgical History:   Procedure Laterality Date    APPENDECTOMY      CHOLECYSTECTOMY      FRACTURE SURGERY      right hip     HERNIA REPAIR      groin    HYSTERECTOMY      INSERTION OF IMPLANTABLE LOOP RECORDER N/A 2018    Procedure: Insertion, Implantable Loop Recorder;  Surgeon: Ashok Herbert MD;  Location: [facility] CATH LAB;  Service: Cardiology;  Laterality: N/A;    PERCUTANEOUS PINNING OF HIP Left 3/23/2019    Procedure: PINNING, HIP, PERCUTANEOUS;  Surgeon: Jorje Hamlin MD;  Location: [facility] OR;  Service: Orthopedics;  Laterality: Left;    VARICOSE VEIN SURGERY       Family History   Problem Relation Age of Onset    Hypertension Mother     Heart disease Mother     Lung disease Father     Diabetes Sister     Diabetes Brother     Kidney disease Son      Social History     Tobacco Use    Smoking status: Former Smoker     Packs/day: 0.35     Years: 44.00     Pack years: 15.40     Types: Cigarettes     Last attempt to quit: 2015     Years since quittin.4    Smokeless tobacco: Never Used    Tobacco comment: 2015   Substance Use Topics    Alcohol use: No     Alcohol/week: 0.0 standard drinks     Frequency: Never     Binge frequency: Never    Drug use: No     Review of Systems   Constitutional: Positive for fatigue (Constant). Negative for fever.   HENT: Negative for sore throat.    Respiratory: Positive for shortness of breath (Unchanged from baseline).    Cardiovascular: Negative for chest pain and leg swelling.   Gastrointestinal: Negative for nausea.   Genitourinary: Positive for flank pain (Left-sided,  constant). Negative for dysuria.   Musculoskeletal: Negative for back pain and myalgias.   Skin: Negative for rash.   Neurological: Negative for weakness.   Hematological: Does not bruise/bleed easily.       Physical Exam     Initial Vitals [09/26/19 1051]   BP Pulse Resp Temp SpO2   (!) 122/58 67 18 98.1 °F (36.7 °C) 96 %      MAP       --         Physical Exam    Nursing note and vitals reviewed.  Constitutional: She appears well-developed and well-nourished. She is not diaphoretic.  Non-toxic appearance. She does not have a sickly appearance. She does not appear ill. No distress.   Elderly, frail female. No acute distress on exam.   HENT:   Head: Normocephalic and atraumatic.   Eyes: EOM are normal.   Neck: Normal range of motion. Neck supple.   Cardiovascular: Normal rate, regular rhythm and normal heart sounds. Exam reveals no gallop and no friction rub.    No murmur heard.  Pulmonary/Chest: Breath sounds normal. No respiratory distress. She has no wheezes. She has no rhonchi. She has no rales.   Abdominal: Soft. There is no tenderness. There is no rigidity, no rebound, no guarding and no CVA tenderness.   Abdomen is soft and non-tender to palpation.    Musculoskeletal: Normal range of motion.        Back:    Neurological: She is alert and oriented to person, place, and time.   Skin: Skin is warm and dry.   No rash noted to the affected area.   Psychiatric: She has a normal mood and affect. Her behavior is normal. Judgment and thought content normal.         ED Course   Procedures  Labs Reviewed   CBC W/ AUTO DIFFERENTIAL - Abnormal; Notable for the following components:       Result Value    RBC 2.99 (*)     Hemoglobin 9.6 (*)     Hematocrit 29.9 (*)     Mean Corpuscular Volume 100 (*)     Mean Corpuscular Hemoglobin 32.1 (*)     All other components within normal limits   COMPREHENSIVE METABOLIC PANEL - Abnormal; Notable for the following components:    BUN, Bld 36 (*)     eGFR if  38 (*)      eGFR if non  33 (*)     All other components within normal limits   URINALYSIS, REFLEX TO URINE CULTURE - Abnormal; Notable for the following components:    Protein, UA Trace (*)     Nitrite, UA Positive (*)     Leukocytes, UA 1+ (*)     All other components within normal limits    Narrative:     Preferred Collection Type->Urine, Clean Catch   URINALYSIS MICROSCOPIC - Abnormal; Notable for the following components:    WBC, UA 45 (*)     Bacteria Few (*)     All other components within normal limits    Narrative:     Preferred Collection Type->Urine, Clean Catch   CULTURE, URINE          Imaging Results          X-Ray Chest AP Portable (Final result)  Result time 09/26/19 12:51:33    Final result by Harpreet Briggs MD (09/26/19 12:51:33)                 Impression:      No acute cardiopulmonary abnormality.      Electronically signed by: Harpreet Briggs  Date:    09/26/2019  Time:    12:51             Narrative:    EXAMINATION:  XR CHEST AP PORTABLE    CLINICAL HISTORY:  Flank pain;    TECHNIQUE:  Single frontal view of the chest was performed.    COMPARISON:  03/22/2019    FINDINGS:  Similar diaphragm eventration on the left.  Lungs are clear.  Unchanged heart size.  Loop recorder.  Aortic arch atherosclerosis.  No pleural effusion or pneumothorax.  Surgical clips right axillary region.  No acute osseous abnormality.                                 Medical Decision Making:   History:   Old Medical Records: I decided to obtain old medical records.  Clinical Tests:   Lab Tests: Ordered and Reviewed  Radiological Study: Ordered and Reviewed            Scribe Attestation:   Scribe #1: I performed the above scribed service and the documentation accurately describes the services I performed. I attest to the accuracy of the note.    I, Dr. Guadalupe, personally performed the services described in this documentation. All medical record entries made by the scribe were at my direction and in my presence.  I  have reviewed the chart and agree that the record reflects my personal performance and is accurate and complete.4:05 PM 09/26/2019            ED Course as of Sep 26 1602   Thu Sep 26, 2019   1203 BP(!): 122/58 [EF]   1203 Temp: 98.1 °F (36.7 °C) [EF]   1203 Temp src: Oral [EF]   1203 Pulse: 67 [EF]   1204 Resp: 18 [EF]   1204 SpO2: 96 % [EF]   1300 X-Ray Chest AP Portable [EF]   1342 WBC, UA(!): 45 [EF]   1342 Bacteria, UA(!): Few [EF]   1342 NITRITE UA(!): Positive [EF]   1342 Leukocytes, UA(!): 1+ [EF]   1409 Patient and family report improvement in pain while sitting up and worsening when supine, likely MSK component.  I think unlikely to be renal colic or PE.  Appears to be bladder infection as well.    [EF]   1419 Patient will be discharged home after IV antibiotics.    [EF]   1447 89-year-old female presents to the ER with left-sided back pain worse with certain positions.  No abdominal pain.  Vital signs are unremarkable.  Metabolic panel CBC unremarkable for any significant findings.  Urinalysis appears to demonstrate UTI.  Patient saw a physician yesterday placed her on antibiotics but she has not yet started these medications.  Patient was given IV Rocephin here.  This was done in the event she is getting an early pyelonephritis.  I do not suspect PE or pneumonia.  I do not suspect lytic lesion or fracture or epidural abscess.  No evidence of an overlying rash to suggest zoster.  Pain is not reproducible with direct palpation but does change with movement making musculoskeletal pain a consideration.  She does not need to be admitted to the hospital.  She will be discharged to take oral antibiotics.  Return to ER if symptoms worsen or change.    [EF]      ED Course User Index  [EF] Tayo Guadalupe MD     Clinical Impression:       ICD-10-CM ICD-9-CM   1. Urinary tract infection without hematuria, site unspecified N39.0 599.0   2. Acute left-sided thoracic back pain M54.6 724.1         Disposition:    Disposition: Discharged  Condition: Stable                        Tayo Guadalupe MD  09/26/19 7032

## 2019-09-27 RX ORDER — SIMVASTATIN 40 MG/1
TABLET, FILM COATED ORAL
Qty: 90 TABLET | Refills: 0 | Status: SHIPPED | OUTPATIENT
Start: 2019-09-27 | End: 2019-11-29 | Stop reason: SDUPTHER

## 2019-09-28 ENCOUNTER — HOSPITAL ENCOUNTER (EMERGENCY)
Facility: HOSPITAL | Age: 84
Discharge: HOME | End: 2019-09-28
Attending: EMERGENCY MEDICINE
Payer: MEDICARE

## 2019-09-28 VITALS
TEMPERATURE: 98 F | BODY MASS INDEX: 21.68 KG/M2 | SYSTOLIC BLOOD PRESSURE: 148 MMHG | HEIGHT: 64 IN | DIASTOLIC BLOOD PRESSURE: 84 MMHG | WEIGHT: 127 LBS | HEART RATE: 60 BPM | RESPIRATION RATE: 18 BRPM | OXYGEN SATURATION: 95 %

## 2019-09-28 DIAGNOSIS — N39.0 URINARY TRACT INFECTION WITHOUT HEMATURIA, SITE UNSPECIFIED: ICD-10-CM

## 2019-09-28 DIAGNOSIS — T14.8XXA MUSCLE STRAIN: Primary | ICD-10-CM

## 2019-09-28 PROCEDURE — 25000003 PHARM REV CODE 250: Performed by: NURSE PRACTITIONER

## 2019-09-28 PROCEDURE — 99283 EMERGENCY DEPT VISIT LOW MDM: CPT

## 2019-09-28 RX ORDER — LIDOCAINE 50 MG/G
1 PATCH TOPICAL
Status: DISCONTINUED | OUTPATIENT
Start: 2019-09-28 | End: 2019-09-28 | Stop reason: HOSPADM

## 2019-09-28 RX ORDER — DICLOFENAC SODIUM 10 MG/G
2 GEL TOPICAL DAILY
Qty: 1 TUBE | Refills: 0 | Status: SHIPPED | OUTPATIENT
Start: 2019-09-28 | End: 2019-11-04 | Stop reason: ALTCHOICE

## 2019-09-28 RX ORDER — LIDOCAINE 50 MG/G
1 PATCH TOPICAL DAILY
Qty: 3 PATCH | Refills: 0 | Status: SHIPPED | OUTPATIENT
Start: 2019-09-28 | End: 2019-09-28 | Stop reason: SDUPTHER

## 2019-09-28 RX ORDER — LIDOCAINE 50 MG/G
1 PATCH TOPICAL DAILY
Qty: 3 PATCH | Refills: 0 | Status: SHIPPED | OUTPATIENT
Start: 2019-09-28 | End: 2021-08-26

## 2019-09-28 RX ORDER — DICLOFENAC SODIUM 10 MG/G
2 GEL TOPICAL DAILY
Qty: 1 TUBE | Refills: 0 | Status: SHIPPED | OUTPATIENT
Start: 2019-09-28 | End: 2019-09-28 | Stop reason: SDUPTHER

## 2019-09-28 RX ADMIN — LIDOCAINE 1 PATCH: 50 PATCH TOPICAL at 02:09

## 2019-09-28 NOTE — ED PROVIDER NOTES
Encounter Date: 9/28/2019    SCRIBE #1 NOTE: Jojo WOOD , britton scribing for, and in the presence of, PINEDA Meraz.       History     Chief Complaint   Patient presents with    left rib area pain     seen in this Ed Thursday        Time seen by provider: 2:00 PM on 09/28/2019    Blaire Cage is a 89 y.o. female who presents to the ED with c/o constant left sided rib pain. She was seen here 2 days ago for the same complaints. Her son states last night she woke up from a nap moaning in pain which has progressively worsened. She states the pain is worse with a deep breath. She took tylenol yesterday and this morning with no relief. She denies any recent injury or trauma. She does note increased burping. She did have similar pain several months ago and cannot recall anything that makes it better. Patient is on oral abx prescribed by her nephrologist for a UTI. She denies any fever and has no flank pain. PMHx includes chronic renal failure. PSHx of appendectomy and cholecystectomy. Multiple known drug allergies.    The history is provided by the patient and a relative.     Review of patient's allergies indicates:   Allergen Reactions    Carvedilol Other (See Comments)     Bradycardia and syncope    Boniva [ibandronate]     Codeine     Hydralazine analogues     Iodinated contrast media Hives    Kionex [sodium polystyrene sulfonate] Diarrhea     From encounter 12/11/17 for diarrhea--not true allergy.   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    Hip arthritis 3/1/2016    Homocysteinemia 2018    Hyperlipidemia     Hypertension     Normocytic normochromic anemia 2018    PE (pulmonary thromboembolism) 2018    Pneumonia of right lower lobe due to infectious organism 2017    Thyroid disease     hypothyroid     Past Surgical History:   Procedure Laterality Date    APPENDECTOMY      CHOLECYSTECTOMY      FRACTURE SURGERY      right hip     HERNIA REPAIR      groin    HYSTERECTOMY      INSERTION OF IMPLANTABLE LOOP RECORDER N/A 2018    Procedure: Insertion, Implantable Loop Recorder;  Surgeon: Ashok Herbert MD;  Location: [facility] CATH LAB;  Service: Cardiology;  Laterality: N/A;    PERCUTANEOUS PINNING OF HIP Left 3/23/2019    Procedure: PINNING, HIP, PERCUTANEOUS;  Surgeon: Jorje Hamlin MD;  Location: [facility] OR;  Service: Orthopedics;  Laterality: Left;    VARICOSE VEIN SURGERY       Family History   Problem Relation Age of Onset    Hypertension Mother     Heart disease Mother     Lung disease Father     Diabetes Sister     Diabetes Brother     Kidney disease Son      Social History     Tobacco Use    Smoking status: Former Smoker     Packs/day: 0.35     Years: 44.00     Pack years: 15.40     Types: Cigarettes     Last attempt to quit: 2015     Years since quittin.4    Smokeless tobacco: Never Used    Tobacco comment: 2015   Substance Use Topics    Alcohol use: No     Alcohol/week: 0.0 standard drinks     Frequency: Never     Binge frequency: Never    Drug use: No     Review of Systems   Constitutional: Negative for fever.   HENT: Negative for sore throat.    Respiratory: Negative for shortness of breath.    Cardiovascular: Positive for chest pain (left sided rib pain).   Gastrointestinal: Negative for nausea.        + increased burping   Genitourinary: Negative for dysuria.   Musculoskeletal: Negative for back pain.   Skin:  Negative for rash.   Neurological: Negative for weakness.   Hematological: Bruises/bleeds easily.       Physical Exam     Initial Vitals [09/28/19 1339]   BP Pulse Resp Temp SpO2   (!) 148/84 60 18 97.8 °F (36.6 °C) 95 %      MAP       --         Physical Exam    Nursing note and vitals reviewed.  Constitutional: Vital signs are normal. She appears well-developed and well-nourished.   HENT:   Head: Normocephalic and atraumatic.   Eyes: Pupils are equal, round, and reactive to light.   Neck: Neck supple.   Cardiovascular: Normal rate, regular rhythm, normal heart sounds and intact distal pulses. Exam reveals no gallop and no friction rub.    No murmur heard.  Pulmonary/Chest: Breath sounds normal. She has no wheezes. She has no rhonchi. She has no rales.   Abdominal: Soft. Normal appearance and bowel sounds are normal. There is no tenderness. There is no CVA tenderness.   Musculoskeletal:        Cervical back: She exhibits no tenderness.        Thoracic back: She exhibits tenderness.        Lumbar back: She exhibits no tenderness.   Tenderness over the left thoracic back radiating under her left breast worse with palpation. No midline C, T, or L tenderness.    Neurological: She is alert and oriented to person, place, and time. She has normal strength. A cranial nerve deficit is present.   Skin: Skin is warm, dry and intact.   Psychiatric: She has a normal mood and affect. Her speech is normal and behavior is normal.         ED Course   Procedures  Labs Reviewed - No data to display       Imaging Results    None          Medical Decision Making:   History:   Old Medical Records: I decided to obtain old medical records.  Differential Diagnosis:   Fracture  Muscle strain  Pyelonephritis        APC / Resident Notes:   Patient is a 89 y.o. female who presents to the ED 09/28/2019 who underwent emergent evaluation for left rib pain. This is patient's 2nd visit this week for the same complaint.  Patient's caregiver states  she was slightly better yesterday but complained of more pain today.  It was not controlled by the Tylenol.  Patient denies any fever urinary symptoms. She states the pain is worse with movement and deep breathing.  She denies any chest pain. Skin is without any rash. I do not think zoster.  Patient denies any recent fall or trauma. She has no crepitus or bony abnormality to palpation.  She has no midline C, T, or L-spine tenderness. Patient had chest x-ray which is reviewed without acute findings.  I do not think repeat imaging is indicated at this time.  I do not think pneumothorax.  Do not think pneumonia or pleurisy.  I do not think fracture.  Patient has absolutely no abdominal tenderness. The patient has no CVA tenderness. Patient does have a noted UTI.  Culture reviewed.  She is currently on antibiotics prescribed by her nephrologist.  I do not think pyelonephritis.  She has no systemic signs of infection at this time.  I see no indication to switch her change her antibiotics or give her IV antibiotics at this time.  She does not appear septic or toxic.  Vital signs normal. Patient and caregiver states they are goal is pain control but she does not wish to have narcotic pain medication.  She is given Voltaren gel as well as Lidoderm patch for pain as needed.  She will also continue Tylenol as needed.  I believe her pain is musculoskeletal.  Based on my clinical evaluation, I do not appreciate any immediate, emergent, or life threatening condition or etiology that warrants additional workup today and feel that the patient can be discharged with close follow up care. Case discussed with Dr. Limon is agreeable to plan of care. Follow up and return precautions discussed; patient verbalized understanding and is agreeable to plan of care. Patient discharged home in stable condition.               Scribe Attestation:   Scribe #1: I performed the above scribed service and the documentation accurately describes  the services I performed. I attest to the accuracy of the note.    Attending Attestation:           Physician Attestation for Scribe:  Physician Attestation Statement for Scribe #1: I, Lenka Gonzales, reviewed documentation, as scribed by in my presence, and it is both accurate and complete.     Comments: I, PINEDA Meraz, personally performed the services described in this documentation. All medical record entries made by the scribe were at my direction and in my presence.  I have reviewed the chart and agree that the record reflects my personal performance and is accurate and complete. PINEDA Meraz.  6:09 PM 09/28/2019 e               Clinical Impression:       ICD-10-CM ICD-9-CM   1. Muscle strain T14.8XXA 848.9         Disposition:   Disposition: Discharged  Condition: Stable                        Lekna Gonzales NP  09/28/19 1805

## 2019-09-29 LAB — BACTERIA UR CULT: ABNORMAL

## 2019-09-30 ENCOUNTER — TELEPHONE (OUTPATIENT)
Dept: FAMILY MEDICINE | Facility: CLINIC | Age: 84
End: 2019-09-30

## 2019-09-30 NOTE — TELEPHONE ENCOUNTER
----- Message from Edith Roach sent at 9/30/2019 12:23 PM CDT -----  Type:  Sooner Appointment Request    Caller is requesting a sooner appointment.  Caller declined first available appointment listed below.  Caller will not accept being placed on the waitlist and is requesting a message be sent to doctor.    Name of Caller:  Blaire Cage  When is the first available appointment? End of October  Symptoms:  Pain on left side (rib area)  Best Call Back Number:  [phone]  Additional Information:  Patient went to [facility] Emergency room/requested doctor

## 2019-10-01 ENCOUNTER — LAB VISIT (OUTPATIENT)
Dept: LAB | Facility: HOSPITAL | Age: 84
End: 2019-10-01
Attending: INTERNAL MEDICINE
Payer: MEDICARE

## 2019-10-01 ENCOUNTER — OFFICE VISIT (OUTPATIENT)
Dept: FAMILY MEDICINE | Facility: CLINIC | Age: 84
End: 2019-10-01
Payer: MEDICARE

## 2019-10-01 VITALS
DIASTOLIC BLOOD PRESSURE: 62 MMHG | WEIGHT: 129.44 LBS | SYSTOLIC BLOOD PRESSURE: 144 MMHG | RESPIRATION RATE: 16 BRPM | OXYGEN SATURATION: 97 % | HEIGHT: 64 IN | TEMPERATURE: 99 F | HEART RATE: 65 BPM | BODY MASS INDEX: 22.1 KG/M2

## 2019-10-01 DIAGNOSIS — N18.30 CKD (CHRONIC KIDNEY DISEASE), STAGE III: ICD-10-CM

## 2019-10-01 DIAGNOSIS — D63.1 ANEMIA, CHRONIC RENAL FAILURE, STAGE 3 (MODERATE): ICD-10-CM

## 2019-10-01 DIAGNOSIS — D53.9 MACROCYTIC ANEMIA: ICD-10-CM

## 2019-10-01 DIAGNOSIS — D50.0 IRON DEFICIENCY ANEMIA DUE TO CHRONIC BLOOD LOSS: ICD-10-CM

## 2019-10-01 DIAGNOSIS — Z09 FOLLOW UP: Primary | ICD-10-CM

## 2019-10-01 DIAGNOSIS — N18.30 ANEMIA, CHRONIC RENAL FAILURE, STAGE 3 (MODERATE): ICD-10-CM

## 2019-10-01 DIAGNOSIS — M47.819 DEGENERATIVE SPINAL ARTHRITIS: ICD-10-CM

## 2019-10-01 DIAGNOSIS — N30.00 ACUTE CYSTITIS WITHOUT HEMATURIA: ICD-10-CM

## 2019-10-01 DIAGNOSIS — D63.8 ANEMIA, CHRONIC DISEASE: ICD-10-CM

## 2019-10-01 DIAGNOSIS — D64.89 ANEMIA DUE TO MULTIPLE MECHANISMS: ICD-10-CM

## 2019-10-01 DIAGNOSIS — D64.9 ANEMIA, UNSPECIFIED TYPE: ICD-10-CM

## 2019-10-01 DIAGNOSIS — R30.0 DYSURIA: ICD-10-CM

## 2019-10-01 DIAGNOSIS — D64.9 NORMOCYTIC NORMOCHROMIC ANEMIA: ICD-10-CM

## 2019-10-01 LAB
BILIRUB SERPL-MCNC: ABNORMAL MG/DL
BLOOD URINE, POC: ABNORMAL
COLOR, POC UA: YELLOW
GLUCOSE UR QL STRIP: NORMAL
KETONES UR QL STRIP: ABNORMAL
LEUKOCYTE ESTERASE URINE, POC: ABNORMAL
NITRITE, POC UA: ABNORMAL
PH, POC UA: 5
PROTEIN, POC: 100
SPECIFIC GRAVITY, POC UA: 1.01
UROBILINOGEN, POC UA: NORMAL

## 2019-10-01 PROCEDURE — 1101F PR PT FALLS ASSESS DOC 0-1 FALLS W/OUT INJ PAST YR: ICD-10-PCS | Mod: CPTII,S$GLB,, | Performed by: NURSE PRACTITIONER

## 2019-10-01 PROCEDURE — 99499 UNLISTED E&M SERVICE: CPT | Mod: S$GLB,,, | Performed by: NURSE PRACTITIONER

## 2019-10-01 PROCEDURE — 99999 PR PBB SHADOW E&M-EST. PATIENT-LVL III: CPT | Mod: PBBFAC,,, | Performed by: NURSE PRACTITIONER

## 2019-10-01 PROCEDURE — 85025 COMPLETE CBC W/AUTO DIFF WBC: CPT

## 2019-10-01 PROCEDURE — 1101F PT FALLS ASSESS-DOCD LE1/YR: CPT | Mod: CPTII,S$GLB,, | Performed by: NURSE PRACTITIONER

## 2019-10-01 PROCEDURE — 99499 RISK ADDL DX/OHS AUDIT: ICD-10-PCS | Mod: S$GLB,,, | Performed by: NURSE PRACTITIONER

## 2019-10-01 PROCEDURE — 36415 COLL VENOUS BLD VENIPUNCTURE: CPT | Mod: PO

## 2019-10-01 PROCEDURE — 81002 URINALYSIS NONAUTO W/O SCOPE: CPT | Mod: S$GLB,,, | Performed by: NURSE PRACTITIONER

## 2019-10-01 PROCEDURE — 81002 POCT URINE DIPSTICK WITHOUT MICROSCOPE: ICD-10-PCS | Mod: S$GLB,,, | Performed by: NURSE PRACTITIONER

## 2019-10-01 PROCEDURE — 87086 URINE CULTURE/COLONY COUNT: CPT

## 2019-10-01 PROCEDURE — 99214 PR OFFICE/OUTPT VISIT, EST, LEVL IV, 30-39 MIN: ICD-10-PCS | Mod: 25,S$GLB,, | Performed by: NURSE PRACTITIONER

## 2019-10-01 PROCEDURE — 99999 PR PBB SHADOW E&M-EST. PATIENT-LVL III: ICD-10-PCS | Mod: PBBFAC,,, | Performed by: NURSE PRACTITIONER

## 2019-10-01 PROCEDURE — 99214 OFFICE O/P EST MOD 30 MIN: CPT | Mod: 25,S$GLB,, | Performed by: NURSE PRACTITIONER

## 2019-10-01 RX ORDER — DOXYCYCLINE 100 MG/1
100 CAPSULE ORAL 2 TIMES DAILY
Qty: 14 CAPSULE | Refills: 0 | Status: SHIPPED | OUTPATIENT
Start: 2019-10-01 | End: 2019-10-08

## 2019-10-01 NOTE — PROGRESS NOTES
Subjective:       Patient ID: Blaire Cage is a 89 y.o. female.    Chief Complaint: Follow-up (ER x 2 ); Leg Pain (right leg ); Back Pain; and Urinary Tract Infection    Patient presents for an ER follow up for UTI and muscle strain. She was seen on 9/28. She went to the ED with c/o constant left sided rib pain and UTI symptoms. She was seen at the ER 2 days ago for similar complaints. The pain is worse with a deep breath. She has been taking tylenol and the lidocaine patches. She denies any recent injury or trauma. She denies any fever and has no flank pain. She finished the keflex only had it for 3 days. Her last dose was Sunday. She is still having urinary frequency and burning.     Review of Systems   Constitutional: Negative for chills, fatigue and fever.   HENT: Negative for congestion, sinus pressure, sinus pain, sneezing and sore throat.    Respiratory: Negative for cough, chest tightness, shortness of breath and wheezing.    Cardiovascular: Negative for chest pain, palpitations and leg swelling.   Gastrointestinal: Negative for abdominal distention, abdominal pain, constipation, diarrhea, nausea and vomiting.   Genitourinary: Positive for dysuria and frequency. Negative for decreased urine volume, difficulty urinating and urgency.   Musculoskeletal: Positive for back pain and myalgias. Negative for arthralgias, gait problem and joint swelling.   Skin: Negative for rash and wound.   Neurological: Negative for dizziness, light-headedness, numbness and headaches.       Objective:      Physical Exam   Constitutional: She is oriented to person, place, and time. She appears well-developed and well-nourished.   Elderly female who presents in a rollator   HENT:   Head: Normocephalic and atraumatic.   Right Ear: External ear normal.   Left Ear: External ear normal.   Nose: Nose normal.   Mouth/Throat: Oropharynx is clear and moist.   Eyes: Pupils are equal, round, and reactive to light.   Neck: Normal range of  motion.   Cardiovascular: Normal rate, regular rhythm, normal heart sounds and intact distal pulses.   Pulmonary/Chest: Effort normal and breath sounds normal.   Abdominal: Soft. Bowel sounds are normal.   Musculoskeletal:        Lumbar back: She exhibits decreased range of motion, tenderness and pain.   Neurological: She is alert and oriented to person, place, and time.   Skin: Skin is warm and dry.   Nursing note and vitals reviewed.      Assessment:       1. Follow up    2. Acute cystitis without hematuria    3. Dysuria    4. Degenerative spinal arthritis    5. CKD (chronic kidney disease), stage III, eGFR 39, progressive 31        Plan:       Blaire was seen today for follow-up, leg pain, back pain and urinary tract infection.    Diagnoses and all orders for this visit:    Follow up  -     POCT URINE DIPSTICK WITHOUT MICROSCOPE  -     Urine culture  -     doxycycline (MONODOX) 100 MG capsule; Take 1 capsule (100 mg total) by mouth 2 (two) times daily. for 7 days    Acute cystitis without hematuria  -     POCT URINE DIPSTICK WITHOUT MICROSCOPE  -     Urine culture  -     doxycycline (MONODOX) 100 MG capsule; Take 1 capsule (100 mg total) by mouth 2 (two) times daily. for 7 days    Dysuria  -     POCT URINE DIPSTICK WITHOUT MICROSCOPE  -     Urine culture  -     doxycycline (MONODOX) 100 MG capsule; Take 1 capsule (100 mg total) by mouth 2 (two) times daily. for 7 days    Degenerative spinal arthritis  Advised warm compresses, OTC muscle rubs, frequent stretches, and continue tylenol. Patient declines PT at this time.     CKD (chronic kidney disease), stage III, eGFR 39, progressive 31  Advised to avoid NSAIDs.    Discussed worsening signs/symptoms and when to return to clinic or go to ED.   Patient expresses understanding and agrees with treatment plan.

## 2019-10-01 NOTE — PATIENT INSTRUCTIONS

## 2019-10-02 LAB
BASOPHILS # BLD AUTO: 0.03 K/UL (ref 0–0.2)
BASOPHILS NFR BLD: 0.5 % (ref 0–1.9)
DIFFERENTIAL METHOD: ABNORMAL
EOSINOPHIL # BLD AUTO: 0.4 K/UL (ref 0–0.5)
EOSINOPHIL NFR BLD: 5.9 % (ref 0–8)
ERYTHROCYTE [DISTWIDTH] IN BLOOD BY AUTOMATED COUNT: 13.7 % (ref 11.5–14.5)
HCT VFR BLD AUTO: 29.3 % (ref 37–48.5)
HGB BLD-MCNC: 9.1 G/DL (ref 12–16)
IMM GRANULOCYTES # BLD AUTO: 0.02 K/UL (ref 0–0.04)
IMM GRANULOCYTES NFR BLD AUTO: 0.3 % (ref 0–0.5)
LYMPHOCYTES # BLD AUTO: 1.9 K/UL (ref 1–4.8)
LYMPHOCYTES NFR BLD: 29.4 % (ref 18–48)
MCH RBC QN AUTO: 32.5 PG (ref 27–31)
MCHC RBC AUTO-ENTMCNC: 31.1 G/DL (ref 32–36)
MCV RBC AUTO: 105 FL (ref 82–98)
MONOCYTES # BLD AUTO: 0.6 K/UL (ref 0.3–1)
MONOCYTES NFR BLD: 9.3 % (ref 4–15)
NEUTROPHILS # BLD AUTO: 3.6 K/UL (ref 1.8–7.7)
NEUTROPHILS NFR BLD: 54.6 % (ref 38–73)
NRBC BLD-RTO: 0 /100 WBC
PLATELET # BLD AUTO: 162 K/UL (ref 150–350)
PMV BLD AUTO: 11.9 FL (ref 9.2–12.9)
RBC # BLD AUTO: 2.8 M/UL (ref 4–5.4)
WBC # BLD AUTO: 6.59 K/UL (ref 3.9–12.7)

## 2019-10-03 ENCOUNTER — HOSPITAL ENCOUNTER (OUTPATIENT)
Dept: RESPIRATORY THERAPY | Facility: HOSPITAL | Age: 84
Discharge: HOME | End: 2019-10-03
Attending: NURSE PRACTITIONER
Payer: MEDICARE

## 2019-10-03 DIAGNOSIS — J44.89 ASTHMA-COPD OVERLAP SYNDROME: ICD-10-CM

## 2019-10-03 DIAGNOSIS — Z99.81 ON HOME OXYGEN THERAPY: ICD-10-CM

## 2019-10-03 LAB
BACTERIA UR CULT: NORMAL
BACTERIA UR CULT: NORMAL
BR6MWT: NORMAL

## 2019-10-03 PROCEDURE — 94618 PULMONARY STRESS TESTING: CPT | Performed by: INTERNAL MEDICINE

## 2019-10-03 PROCEDURE — 94618 PULMONARY STRESS TESTING: ICD-10-PCS | Mod: 26,,, | Performed by: INTERNAL MEDICINE

## 2019-10-03 PROCEDURE — 94618 PULMONARY STRESS TESTING: CPT | Mod: 26,,, | Performed by: INTERNAL MEDICINE

## 2019-10-03 NOTE — PROGRESS NOTES
Please call the patient and let her know that the urine culture shows multiple organisms. If symptoms do not improve on the antibiotic she needs to be reevaluated. Thanks.

## 2019-10-09 DIAGNOSIS — J43.8 OTHER EMPHYSEMA: ICD-10-CM

## 2019-10-10 RX ORDER — IPRATROPIUM BROMIDE AND ALBUTEROL SULFATE 2.5; .5 MG/3ML; MG/3ML
SOLUTION RESPIRATORY (INHALATION)
Qty: 120 VIAL | Refills: 5 | Status: SHIPPED | OUTPATIENT
Start: 2019-10-10 | End: 2019-12-10

## 2019-10-22 ENCOUNTER — HOSPITAL ENCOUNTER (EMERGENCY)
Facility: HOSPITAL | Age: 84
Discharge: HOME | End: 2019-10-22
Attending: EMERGENCY MEDICINE
Payer: MEDICARE

## 2019-10-22 VITALS
SYSTOLIC BLOOD PRESSURE: 142 MMHG | HEART RATE: 83 BPM | HEIGHT: 64 IN | RESPIRATION RATE: 20 BRPM | BODY MASS INDEX: 22.02 KG/M2 | DIASTOLIC BLOOD PRESSURE: 64 MMHG | OXYGEN SATURATION: 97 % | WEIGHT: 129 LBS | TEMPERATURE: 99 F

## 2019-10-22 DIAGNOSIS — J44.1 COPD EXACERBATION: Primary | ICD-10-CM

## 2019-10-22 DIAGNOSIS — R06.02 SHORTNESS OF BREATH: ICD-10-CM

## 2019-10-22 LAB
ANION GAP SERPL CALC-SCNC: 11 MMOL/L (ref 8–16)
BASOPHILS # BLD AUTO: 0.04 K/UL (ref 0–0.2)
BASOPHILS NFR BLD: 0.4 % (ref 0–1.9)
BUN SERPL-MCNC: 33 MG/DL (ref 8–23)
CALCIUM SERPL-MCNC: 9.4 MG/DL (ref 8.7–10.5)
CHLORIDE SERPL-SCNC: 96 MMOL/L (ref 95–110)
CO2 SERPL-SCNC: 30 MMOL/L (ref 23–29)
CREAT SERPL-MCNC: 1.5 MG/DL (ref 0.5–1.4)
DIFFERENTIAL METHOD: ABNORMAL
EOSINOPHIL # BLD AUTO: 0.1 K/UL (ref 0–0.5)
EOSINOPHIL NFR BLD: 0.6 % (ref 0–8)
ERYTHROCYTE [DISTWIDTH] IN BLOOD BY AUTOMATED COUNT: 13.1 % (ref 11.5–14.5)
EST. GFR  (AFRICAN AMERICAN): 35 ML/MIN/1.73 M^2
EST. GFR  (NON AFRICAN AMERICAN): 31 ML/MIN/1.73 M^2
GLUCOSE SERPL-MCNC: 110 MG/DL (ref 70–110)
HCT VFR BLD AUTO: 33.9 % (ref 37–48.5)
HGB BLD-MCNC: 10.9 G/DL (ref 12–16)
IMM GRANULOCYTES # BLD AUTO: 0.06 K/UL (ref 0–0.04)
INFLUENZA A, MOLECULAR: NEGATIVE
INFLUENZA B, MOLECULAR: NEGATIVE
LYMPHOCYTES # BLD AUTO: 0.6 K/UL (ref 1–4.8)
LYMPHOCYTES NFR BLD: 5.8 % (ref 18–48)
MCH RBC QN AUTO: 32.3 PG (ref 27–31)
MCHC RBC AUTO-ENTMCNC: 32.2 G/DL (ref 32–36)
MCV RBC AUTO: 101 FL (ref 82–98)
MONOCYTES # BLD AUTO: 0.6 K/UL (ref 0.3–1)
MONOCYTES NFR BLD: 6 % (ref 4–15)
NEUTROPHILS # BLD AUTO: 8.8 K/UL (ref 1.8–7.7)
NEUTROPHILS NFR BLD: 86.6 % (ref 38–73)
NRBC BLD-RTO: 0 /100 WBC
PLATELET # BLD AUTO: 151 K/UL (ref 150–350)
PMV BLD AUTO: 11.1 FL (ref 9.2–12.9)
POTASSIUM SERPL-SCNC: 4.5 MMOL/L (ref 3.5–5.1)
RBC # BLD AUTO: 3.37 M/UL (ref 4–5.4)
SODIUM SERPL-SCNC: 137 MMOL/L (ref 136–145)
SPECIMEN SOURCE: NORMAL
WBC # BLD AUTO: 10.15 K/UL (ref 3.9–12.7)

## 2019-10-22 PROCEDURE — 27000221 HC OXYGEN, UP TO 24 HOURS

## 2019-10-22 PROCEDURE — 63600175 PHARM REV CODE 636 W HCPCS: Performed by: EMERGENCY MEDICINE

## 2019-10-22 PROCEDURE — 80048 BASIC METABOLIC PNL TOTAL CA: CPT

## 2019-10-22 PROCEDURE — 85025 COMPLETE CBC W/AUTO DIFF WBC: CPT

## 2019-10-22 PROCEDURE — 87502 INFLUENZA DNA AMP PROBE: CPT

## 2019-10-22 PROCEDURE — 94640 AIRWAY INHALATION TREATMENT: CPT

## 2019-10-22 PROCEDURE — 36415 COLL VENOUS BLD VENIPUNCTURE: CPT

## 2019-10-22 PROCEDURE — 25000242 PHARM REV CODE 250 ALT 637 W/ HCPCS: Performed by: EMERGENCY MEDICINE

## 2019-10-22 PROCEDURE — 93005 ELECTROCARDIOGRAM TRACING: CPT

## 2019-10-22 PROCEDURE — 96374 THER/PROPH/DIAG INJ IV PUSH: CPT

## 2019-10-22 PROCEDURE — 99285 EMERGENCY DEPT VISIT HI MDM: CPT | Mod: 25

## 2019-10-22 RX ORDER — ALBUTEROL SULFATE 2.5 MG/.5ML
10 SOLUTION RESPIRATORY (INHALATION)
Status: COMPLETED | OUTPATIENT
Start: 2019-10-22 | End: 2019-10-22

## 2019-10-22 RX ORDER — IPRATROPIUM BROMIDE 0.5 MG/2.5ML
0.5 SOLUTION RESPIRATORY (INHALATION)
Status: COMPLETED | OUTPATIENT
Start: 2019-10-22 | End: 2019-10-22

## 2019-10-22 RX ORDER — METHYLPREDNISOLONE SOD SUCC 125 MG
125 VIAL (EA) INJECTION
Status: COMPLETED | OUTPATIENT
Start: 2019-10-22 | End: 2019-10-22

## 2019-10-22 RX ORDER — PREDNISONE 20 MG/1
40 TABLET ORAL DAILY
Qty: 8 TABLET | Refills: 0 | Status: SHIPPED | OUTPATIENT
Start: 2019-10-23 | End: 2019-10-27

## 2019-10-22 RX ADMIN — METHYLPREDNISOLONE SODIUM SUCCINATE 125 MG: 125 INJECTION, POWDER, FOR SOLUTION INTRAMUSCULAR; INTRAVENOUS at 09:10

## 2019-10-22 RX ADMIN — IPRATROPIUM BROMIDE 0.5 MG: 0.5 SOLUTION RESPIRATORY (INHALATION) at 09:10

## 2019-10-22 RX ADMIN — ALBUTEROL SULFATE 10 MG: 2.5 SOLUTION RESPIRATORY (INHALATION) at 09:10

## 2019-10-22 NOTE — ED PROVIDER NOTES
Encounter Date: 10/22/2019    SCRIBE #1 NOTE: Justa WOOD, britton scribing for, and in the presence of, Harpreet Dudley MD.       History     Chief Complaint   Patient presents with    Shortness of Breath     started Friday with sore throat        Time seen by provider: 8:58 AM on 10/22/2019    Blaire Cage is a 89 y.o. female with PMHx of HTN, HLD, thyroid, COPD, CHF, anemia, and presently on Eliquis who presents to the ED for evaluation of worsening SOB that started x3 days ago. The patient endorses new onset of cough and sore throat x4 days ago. Cough worsened x3 days ago and is described to be productive. The patient was prescribed a 3-day course of Prednisone by her pulmonologist and took the last dose this morning. She was prompted to the ED due to home nebulizer not improving SOB this morning. She denies pain, vomiting, diarrhea, fever, leg swelling, CP, and LOC. She mentions having decreased appetite since yesterday. The patient has no other medical concerns or complaints at this moment. SHx includes hysterectomy, appendectomy, and cholecystectomy. Carvedilol, codeine, and morphine allergy noted.     The history is provided by the patient.     Review of patient's allergies indicates:   Allergen Reactions    Carvedilol Other (See Comments)     Bradycardia and syncope    Boniva [ibandronate]     Codeine     Hydralazine analogues     Iodinated contrast media Hives    Kionex [sodium polystyrene sulfonate] Diarrhea     From encounter 12/11/17 for diarrhea--not true allergy.    Lisinopril     Morphine      Past Medical History:   Diagnosis Date    Anemia due to multiple mechanisms 6/29/2018    Anemia, chronic disease 6/29/2018    Anemia, chronic renal failure, stage 3 (moderate) 6/29/2018    Anticoagulant long-term use     aspirin    Aortic aneurysm     CHF (congestive heart failure)     COPD (chronic obstructive pulmonary disease)     COPD with acute exacerbation 1/9/2015    Diabetes  mellitus type II     DVT (deep venous thrombosis) 2018    Encounter for blood transfusion     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 2018    Hip arthritis 3/1/2016    Homocysteinemia 2018    Hyperlipidemia     Hypertension     Normocytic normochromic anemia 2018    PE (pulmonary thromboembolism) 2018    Pneumonia of right lower lobe due to infectious organism 2017    Thyroid disease     hypothyroid     Past Surgical History:   Procedure Laterality Date    APPENDECTOMY      CHOLECYSTECTOMY      FRACTURE SURGERY      right hip     HERNIA REPAIR      groin    HYSTERECTOMY      INSERTION OF IMPLANTABLE LOOP RECORDER N/A 2018    Procedure: Insertion, Implantable Loop Recorder;  Surgeon: Ashok Herbert MD;  Location: [facility] CATH LAB;  Service: Cardiology;  Laterality: N/A;    PERCUTANEOUS PINNING OF HIP Left 3/23/2019    Procedure: PINNING, HIP, PERCUTANEOUS;  Surgeon: Jorje Hamlin MD;  Location: [facility] OR;  Service: Orthopedics;  Laterality: Left;    VARICOSE VEIN SURGERY       Family History   Problem Relation Age of Onset    Hypertension Mother     Heart disease Mother     Lung disease Father     Diabetes Sister     Diabetes Brother     Kidney disease Son      Social History     Tobacco Use    Smoking status: Former Smoker     Packs/day: 0.35     Years: 44.00     Pack years: 15.40     Types: Cigarettes     Last attempt to quit: 2015     Years since quittin.4    Smokeless tobacco: Never Used    Tobacco comment: 2015   Substance Use Topics    Alcohol use: No     Alcohol/week: 0.0 standard drinks     Frequency: Never     Binge frequency: Never    Drug use: No     Review of Systems   Constitutional: Positive for appetite change (decreased). Negative for fever.   HENT: Positive for sore throat.    Respiratory: Positive for cough and shortness of breath.    Cardiovascular: Negative for chest pain and leg swelling.   Gastrointestinal: Negative for  abdominal pain, diarrhea, nausea and vomiting.   Genitourinary: Negative for difficulty urinating.   Musculoskeletal: Negative for joint swelling.   Skin: Negative for pallor and rash.   Neurological: Negative for weakness, numbness and headaches.   Hematological: Bruises/bleeds easily.   Psychiatric/Behavioral: The patient is not nervous/anxious.        Physical Exam     Initial Vitals [10/22/19 0850]   BP Pulse Resp Temp SpO2   (!) 169/72 85 (!) 28 99.3 °F (37.4 °C) (!) 84 %      MAP       --         Physical Exam    Nursing note and vitals reviewed.  Constitutional: She appears well-developed and well-nourished. She is not diaphoretic. No distress.   HENT:   Head: Normocephalic and atraumatic.   Mouth/Throat: Oropharynx is clear and moist.   Eyes: Conjunctivae are normal.   Neck: Neck supple.   Cardiovascular: Normal rate, regular rhythm, normal heart sounds and intact distal pulses. Exam reveals no gallop and no friction rub.    No murmur heard.  Pulmonary/Chest: No accessory muscle usage. Tachypnea noted. She has wheezes. She has rhonchi. She has no rales.   Diffuse bilateral wheezing. Mild tachypnea. No accessory muscle use. No stridor. Scattered rhonchi noted. No rales.    Abdominal: Soft. She exhibits no distension. There is no tenderness.   Musculoskeletal: Normal range of motion. She exhibits no edema.   No peripheral edema. No leg asymmetry.   Neurological: She is alert and oriented to person, place, and time.   Skin: No rash noted. No erythema.   Psychiatric: Her speech is normal.         ED Course   Procedures  Labs Reviewed   CBC W/ AUTO DIFFERENTIAL - Abnormal; Notable for the following components:       Result Value    RBC 3.37 (*)     Hemoglobin 10.9 (*)     Hematocrit 33.9 (*)     Mean Corpuscular Volume 101 (*)     Mean Corpuscular Hemoglobin 32.3 (*)     Gran # (ANC) 8.8 (*)     Immature Grans (Abs) 0.06 (*)     Lymph # 0.6 (*)     Gran% 86.6 (*)     Lymph% 5.8 (*)     All other components within  normal limits   BASIC METABOLIC PANEL - Abnormal; Notable for the following components:    CO2 30 (*)     BUN, Bld 33 (*)     Creatinine 1.5 (*)     eGFR if  35 (*)     eGFR if non  31 (*)     All other components within normal limits   INFLUENZA A & B BY MOLECULAR        ECG Results          EKG 12-lead (In process)  Result time 10/22/19 09:51:19    In process by Interface, Lab In [facility] (10/22/19 09:51:19)                 Narrative:    Test Reason : R06.02,    Vent. Rate : 076 BPM     Atrial Rate : 076 BPM     P-R Int : 148 ms          QRS Dur : 086 ms      QT Int : 368 ms       P-R-T Axes : 073 025 077 degrees     QTc Int : 414 ms    Normal sinus rhythm  Early transition, increased R/S ratio in V1, consider posterior infarct  Abnormal ECG  When compared with ECG of 02-JUL-2019 15:18,  Criteria for Septal infarct are no longer Present  Nonspecific T wave abnormality no longer evident in Lateral leads    Referred By: [email or web address]   SELF           Confirmed By:                             Imaging Results          X-Ray Chest 1 View (Final result)  Result time 10/22/19 09:48:12    Final result by Harpreet Briggs MD (10/22/19 09:48:12)                 Impression:      No airspace disease to suggest pneumonia.      Electronically signed by: Harpreet Briggs  Date:    10/22/2019  Time:    09:48             Narrative:    EXAMINATION:  XR CHEST 1 VIEW    CLINICAL HISTORY:  SOB;    TECHNIQUE:  Single frontal view of the chest was performed.    COMPARISON:  09/26/2019 and 05/23/2013    FINDINGS:  No new airspace disease.  Unchanged heart size.  Loop recorder.  Atherosclerosis.  No pleural effusion or pneumothorax.  Surgical clips right axillary region.  No acute osseous abnormality.                            (radiology reading, visualized by me)     Medical Decision Making:   History:   Old Medical Records: I decided to obtain old medical records.  Clinical Tests:   Lab Tests: Reviewed and  Ordered  Radiological Study: Reviewed and Ordered  Medical Tests: Reviewed and Ordered            Scribe Attestation:   Scribe #1: I performed the above scribed service and the documentation accurately describes the services I performed. I attest to the accuracy of the note.      I, Dr. Harpreet Dudley, personally performed the services described in this documentation. All medical record entries made by the scribe were at my direction and in my presence.  I have reviewed the chart and agree that the record reflects my personal performance and is accurate and complete. Harpreet Dudley MD.  12:19 PM 10/22/2019    Blaire Cage is a 89 y.o. female presenting with shortness of breath consistent with likely viral URI and COPD exacerbation.  No evidence for pneumonia or indication for antibiotics at this point.  I have very low suspicion for other acute life-threatening process such as CHF exacerbation, sepsis, ACS.  I do not think further cardiac biomarker is indicated.  I doubt PE.  He is significantly improved and resting comfortably after IV Solu-Medrol and breathing treatments here.  I did offer admission but she feels better prefers to go home.  I feel this is reasonable.  Several additional days of steroids prescribed with as needed nebulizer treatments at home and close outpatient follow-up.  Detailed return precautions reviewed.  She does have significant support from family who advised to observe her and return for any sign of worsening.          ED Course as of Oct 22 1220   Tue Oct 22, 2019   0907 96% on 2L NC (baseline O2 at home).      [MR]   1106 Patient improved with decreased wheezing after treatments, laying supine with no tachypnea, states she feels better.    [MR]   1111 EKG:  NSR, rate of 76, normal intervals and axis.  There are no acute ST or T wave changes suggestive of acute ischemia or infarction.  Old T-wave inversion in aVL similar to prior.      [MR]      ED Course User Index  [MR]  Harpreet Dudley MD     Clinical Impression:       ICD-10-CM ICD-9-CM   1. COPD exacerbation J44.1 491.21   2. Shortness of breath R06.02 786.05         Disposition:   Disposition: Discharged  Condition: Stable                        Harpreet Dudley MD  10/22/19 5651

## 2019-10-22 NOTE — ED NOTES
Cardiovascular: Normal rate, regular rhythm, normal heart sounds and intact distal pulses. Exam reveals no gallop and no friction rub.    No murmur heard.  Pulmonary/Chest: No accessory muscle usage. Tachypnea noted. She has wheezes. She has rhonchi. She has no rales.   Diffuse bilateral wheezing. Mild tachypnea. No accessory muscle use. No stridor. Scattered rhonchi noted. No rales.    Abdominal: Soft. She exhibits no distension. There is no tenderness.   Musculoskeletal: Normal range of motion. She exhibits no edema.   No peripheral edema. No leg asymmetry.

## 2019-10-22 NOTE — ED NOTES
Pt reports SOB completely resolved after neb tx. Pt denies needs at this time. VSS. ED work up in progress. No needs identified at this time. Will monitor prn.

## 2019-10-23 ENCOUNTER — TELEPHONE (OUTPATIENT)
Dept: FAMILY MEDICINE | Facility: CLINIC | Age: 84
End: 2019-10-23

## 2019-10-23 NOTE — TELEPHONE ENCOUNTER
----- Message from Vandana Manrique sent at 10/23/2019  8:37 AM CDT -----  Contact: pt  Pt states that she went to the ER on yesterday 10/22 and told to follow up with the office in 3-4 days. Pt is also willing to see GABRIELA brown appointments with the ..[phone] (home)

## 2019-10-28 ENCOUNTER — OFFICE VISIT (OUTPATIENT)
Dept: FAMILY MEDICINE | Facility: CLINIC | Age: 84
End: 2019-10-28
Payer: MEDICARE

## 2019-10-28 VITALS
SYSTOLIC BLOOD PRESSURE: 122 MMHG | BODY MASS INDEX: 21.22 KG/M2 | TEMPERATURE: 98 F | HEIGHT: 64 IN | DIASTOLIC BLOOD PRESSURE: 58 MMHG | OXYGEN SATURATION: 93 % | WEIGHT: 124.31 LBS | HEART RATE: 81 BPM

## 2019-10-28 DIAGNOSIS — J44.1 COPD EXACERBATION: Primary | ICD-10-CM

## 2019-10-28 DIAGNOSIS — I15.2 HYPERTENSION ASSOCIATED WITH DIABETES: ICD-10-CM

## 2019-10-28 DIAGNOSIS — E11.59 HYPERTENSION ASSOCIATED WITH DIABETES: ICD-10-CM

## 2019-10-28 DIAGNOSIS — F17.200 TOBACCO DEPENDENCE: ICD-10-CM

## 2019-10-28 DIAGNOSIS — Z99.81 ON HOME OXYGEN THERAPY: ICD-10-CM

## 2019-10-28 DIAGNOSIS — N18.30 CKD (CHRONIC KIDNEY DISEASE), STAGE III: ICD-10-CM

## 2019-10-28 PROCEDURE — 99999 PR PBB SHADOW E&M-EST. PATIENT-LVL III: ICD-10-PCS | Mod: PBBFAC,,, | Performed by: NURSE PRACTITIONER

## 2019-10-28 PROCEDURE — 1101F PR PT FALLS ASSESS DOC 0-1 FALLS W/OUT INJ PAST YR: ICD-10-PCS | Mod: CPTII,S$GLB,, | Performed by: NURSE PRACTITIONER

## 2019-10-28 PROCEDURE — 99214 OFFICE O/P EST MOD 30 MIN: CPT | Mod: S$GLB,,, | Performed by: NURSE PRACTITIONER

## 2019-10-28 PROCEDURE — 99214 PR OFFICE/OUTPT VISIT, EST, LEVL IV, 30-39 MIN: ICD-10-PCS | Mod: S$GLB,,, | Performed by: NURSE PRACTITIONER

## 2019-10-28 PROCEDURE — 99999 PR PBB SHADOW E&M-EST. PATIENT-LVL III: CPT | Mod: PBBFAC,,, | Performed by: NURSE PRACTITIONER

## 2019-10-28 PROCEDURE — 1101F PT FALLS ASSESS-DOCD LE1/YR: CPT | Mod: CPTII,S$GLB,, | Performed by: NURSE PRACTITIONER

## 2019-10-28 RX ORDER — DOXYCYCLINE HYCLATE 100 MG
100 TABLET ORAL 2 TIMES DAILY
Qty: 14 TABLET | Refills: 0 | Status: SHIPPED | OUTPATIENT
Start: 2019-10-28 | End: 2019-11-04 | Stop reason: ALTCHOICE

## 2019-10-28 RX ORDER — PREDNISONE 20 MG/1
20 TABLET ORAL DAILY
COMMUNITY
End: 2020-01-08 | Stop reason: ALTCHOICE

## 2019-10-28 NOTE — PROGRESS NOTES
Subjective:       Patient ID: Blaire Cage is a 89 y.o. female.    Chief Complaint: Follow-up    Ms. Cage is an 89-year-old female patient who presents today for an ED follow-up.  Here today with her son.  Presented to [facility] on 10/22 with complaints of worsening shortness of breath that started 3 days prior.  Also reported a cough and sore throat. Cough productive and she was prescribed a 3 day course of steroids by her pulmonologist.  This did not help improve her symptoms. She was told to go to the ED since home nebulizer treatments were not improving shortness of breath.  Associated symptoms included decreased appetite.  There was no chest pain, nausea, vomiting, diarrhea, change in level of consciousness or leg swelling. Vital signs the ED showed elevated blood pressure, respiratory rate of 28, and oxygen sat of 84%.  Found to have rhonchi and wheezes on exam, chest x-ray negative.  Symptoms improved with IV steroids and breathing treatment.  Labs are stable.  Flu negative.  She was offered admission but preferred to go home.  She was prescribed p.o. steroids and as needed nebulizer treatments with instructions for outpatient follow-up.      Reports she is feeling much better today than when she was seen at the ED.  Has completed p.o. Steroids. Still using nebulizer p.r.n..  Still feeling occasionally short of breath.  No longer wheezing.  Cough is continuously productive of thick green sputum.  She has continuous home oxygen ordered and reports she has been using it.  Does not have at appointment. Vtal stable today, oxygen sat 93% room air.  Reports she is slowly getting better.  Weight loss of approximately 5 lb.    Patient Active Problem List   Diagnosis    Diabetic neuropathy, type II diabetes mellitus    CKD (chronic kidney disease), stage III, eGFR 39, progressive 31    Hypothyroidism    Hypertension associated with diabetes    Hyperlipidemia associated with type 2 diabetes  mellitus    Type 2 diabetes mellitus with stage 3 chronic kidney disease    Right carotid bruit    COPD mixed type    Anxiety    Tobacco dependence    Abdominal aortic aneurysm without rupture    Hip arthritis    Degenerative spinal arthritis    Osteoporosis    Left ventricular diastolic dysfunction with preserved systolic function    Hypertensive left ventricular hypertrophy, without heart failure    Smoker, quit 5/2016, 25 pck-years    Abdominal obesity    Absolute anemia    Body mass index (BMI) 23.0-23.9, adult, today 24.1    Iron deficiency anemia due to chronic blood loss    Proteinuria due to type 2 diabetes mellitus    History of syncope in childhood    Prerenal azotemia    Drug-induced constipation    COPD with acute exacerbation    Asthmatic bronchitis with acute exacerbation    Shortness of breath    Controlled type 2 diabetes mellitus, without long-term current use of insulin    Acute pulmonary embolism    SOB (shortness of breath)    Asthma-COPD overlap syndrome    Eosinophilic asthma    Moderate malnutrition    Type 2 diabetes mellitus with kidney complication, without long-term current use of insulin    Chronic anticoagulation    Constipation    DVT (deep venous thrombosis)    History of pulmonary embolism    Anemia due to multiple mechanisms    Anemia, chronic disease    Normocytic normochromic anemia    Anemia, chronic renal failure, stage 3 (moderate)    Homocysteinemia    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    Hyperkalemia    Diastolic CHF, chronic    Anemia of chronic disease    Kidney stones    Bradycardia    Macrocytic anemia    Orthostatic hypotension    Closed left hip fracture    Hip pain, left    On home oxygen therapy       Current Outpatient Medications:     acetaminophen (TYLENOL) 325 MG tablet, Take 2 tablets (650 mg total) by mouth every 4 (four) hours as needed. (Patient taking differently: Take 650 mg by mouth every 4 (four) hours as  needed for Pain. ), Disp: , Rfl: 0    albuterol-ipratropium (DUO-NEB) 2.5 mg-0.5 mg/3 mL nebulizer solution, USE ONE VIAL IN NEBULIZER EVERY 6 HOURS WHILE AWAKE PRN SOB, Disp: 120 vial, Rfl: 5    amLODIPine (NORVASC) 5 MG tablet, Take 1 tablet (5 mg total) by mouth once daily., Disp: 90 tablet, Rfl: 3    ascorbic acid (VITAMIN C) 500 MG tablet, Take 500 mg by mouth once daily.  , Disp: , Rfl:     aspirin (ECOTRIN) 81 MG EC tablet, Take 81 mg by mouth once daily.  , Disp: , Rfl:     budesonide (PULMICORT) 0.5 mg/2 mL nebulizer solution, Take 2 mLs (0.5 mg total) by nebulization 2 (two) times daily. Controller, Disp: 120 mL, Rfl: 5    calcium carbonate (OS-TASIA) 500 mg calcium (1,250 mg) tablet, Take 1 tablet by mouth 2 (two) times daily.  , Disp: , Rfl:     cyanocobalamin/folic acid (FOLTRATE ORAL), vitamin B12-folic acid  2.5 - 25-2MG, Disp: , Rfl:     docusate sodium (COLACE) 100 MG capsule, Take 1 capsule (100 mg total) by mouth 2 (two) times daily., Disp: 90 capsule, Rfl: 0    ELIQUIS 5 mg Tab, TAKE 1 TABLET BY MOUTH TWICE DAILY, Disp: 60 tablet, Rfl: 5    ferrous sulfate (FEOSOL) 325 mg (65 mg iron) Tab tablet, Take 1 tablet (325 mg total) by mouth 2 (two) times daily with meals., Disp: 180 tablet, Rfl: 3    fish oil-omega-3 fatty acids 300-1,000 mg capsule, Take 2 g by mouth every evening. , Disp: , Rfl:     fluticasone (FLONASE) 50 mcg/actuation nasal spray, 2 sprays by Each Nare route once daily., Disp: 48 g, Rfl: 3    FOLBIC 2.5-25-2 mg Tab, TAKE 1 TABLET BY MOUTH ONCE DAILY, Disp: 30 tablet, Rfl: 6    furosemide (LASIX) 20 MG tablet, Take 1 tablet on Tuesdays and Thursdays., Disp: 24 tablet, Rfl: 1    gabapentin (NEURONTIN) 300 MG capsule, TAKE ONE CAPSULE BY MOUTH IN THE EVENING, Disp: 90 capsule, Rfl: 3    glipiZIDE (GLUCOTROL) 2.5 MG TR24, Take 1 tablet (2.5 mg total) by mouth daily with breakfast., Disp: 90 tablet, Rfl: 3    levothyroxine (SYNTHROID) 88 MCG tablet, Take 1 tablet (88 mcg  total) by mouth before breakfast., Disp: 90 tablet, Rfl: 3    lidocaine (LIDODERM) 5 %, Place 1 patch onto the skin once daily. Remove & Discard patch within 12 hours or as directed by MD, Disp: 3 patch, Rfl: 0    magnesium oxide (MAG-OX) 400 mg (241.3 mg magnesium) tablet, TAKE 1 TABLET BY MOUTH ONCE DAILY, Disp: 90 tablet, Rfl: 3    montelukast (SINGULAIR) 10 mg tablet, , Disp: , Rfl:     multivitamin (THERAGRAN) tablet, Take 1 tablet by mouth once daily., Disp: , Rfl:     mupirocin (BACTROBAN) 2 % ointment, Apply topically 2 (two) times daily., Disp: 30 g, Rfl: 1    nitroGLYCERIN (NITROSTAT) 0.4 MG SL tablet, Place 1 tablet (0.4 mg total) under the tongue every 5 (five) minutes as needed for Chest pain. As needed (Patient taking differently: Place 0.4 mg under the tongue every 5 (five) minutes as needed for Chest pain. Seek medical help if pain is not relieved after the third dose.), Disp: 30 tablet, Rfl: 3    sertraline (ZOLOFT) 25 MG tablet, , Disp: , Rfl:     simvastatin (ZOCOR) 40 MG tablet, TAKE 1 TABLET BY MOUTH ONCE DAILY IN THE EVENING, Disp: 90 tablet, Rfl: 0    vitamin D 1000 units Tab, Take 1 tablet (1,000 Units total) by mouth once daily., Disp: 90 tablet, Rfl: 3    diclofenac sodium (VOLTAREN) 1 % Gel, Apply 2 g topically once daily. for 3 days, Disp: 1 Tube, Rfl: 0    doxycycline (VIBRA-TABS) 100 MG tablet, Take 1 tablet (100 mg total) by mouth 2 (two) times daily. for 7 days, Disp: 14 tablet, Rfl: 0    predniSONE (DELTASONE) 20 MG tablet, Take 20 mg by mouth once daily., Disp: , Rfl:     Lab Results   Component Value Date    WBC 10.15 10/22/2019    HGB 10.9 (L) 10/22/2019    HCT 33.9 (L) 10/22/2019     10/22/2019    CHOL 149 05/23/2017    TRIG 94 05/23/2017    HDL 52 05/23/2017    ALT 18 09/26/2019    AST 24 09/26/2019     10/22/2019    K 4.5 10/22/2019    CL 96 10/22/2019    CREATININE 1.5 (H) 10/22/2019    BUN 33 (H) 10/22/2019    CO2 30 (H) 10/22/2019    TSH 0.189 (L)  09/13/2018    INR 1.0 03/22/2019    HGBA1C 6.2 (H) 03/22/2019     Review of Systems   Constitutional: Positive for appetite change and fatigue. Negative for fever.   Respiratory: Positive for cough (productive) and shortness of breath (at times). Negative for chest tightness and wheezing.    Cardiovascular: Negative for chest pain and palpitations.   Gastrointestinal: Negative for diarrhea, nausea and vomiting.   Neurological: Negative for dizziness, weakness and light-headedness.       Objective:      Physical Exam   Constitutional: She is oriented to person, place, and time. Vital signs are normal. She appears well-developed and well-nourished. No distress.   Cardiovascular: Normal rate, regular rhythm and normal heart sounds.   Pulmonary/Chest: Effort normal and breath sounds normal. She has no wheezes. She has no rales.   Abdominal: Soft. Normal appearance.   Musculoskeletal: She exhibits no edema.   Neurological: She is alert and oriented to person, place, and time.   Skin: Skin is warm and dry.   Psychiatric: She has a normal mood and affect.   Nursing note and vitals reviewed.      Assessment:       1. COPD exacerbation    2. On home oxygen therapy    3. Tobacco dependence    4. Hypertension associated with diabetes    5. CKD (chronic kidney disease), stage III, eGFR 39, progressive 31        Plan:       Blaire was seen today for follow-up.    Diagnoses and all orders for this visit:    COPD exacerbation  -     doxycycline (VIBRA-TABS) 100 MG tablet; Take 1 tablet (100 mg total) by mouth 2 (two) times daily. for 7 days  Take antibiotics with food, increase fluids. RTC if symptoms fail to improve, become worse or return after completion of antibiotics   Educated can cause sun sensitivity  Continue nebulizers PRN  encouraged home O2 monitoring     On home oxygen therapy  Encouraged use  Notify clinic if lower than 93%- home monitoring      Tobacco dependence  Encouraged cessation    Hypertension associated with  diabetes  Controlled on current medications    CKD (chronic kidney disease), stage III, eGFR 39, progressive 31  Stable and chronic.  Will continue to monitor q3-6 months and control chronic conditions as optimally as possible to preserve function.    Close F/U 1 week or sooner PRN

## 2019-10-31 ENCOUNTER — CLINICAL SUPPORT (OUTPATIENT)
Dept: CARDIOLOGY | Facility: CLINIC | Age: 84
End: 2019-10-31
Payer: MEDICARE

## 2019-10-31 PROCEDURE — 93298 CARDIAC DEVICE CHECK - REMOTE: ICD-10-PCS | Mod: S$GLB,,, | Performed by: INTERNAL MEDICINE

## 2019-10-31 PROCEDURE — 93298 REM INTERROG DEV EVAL SCRMS: CPT | Mod: S$GLB,,, | Performed by: INTERNAL MEDICINE

## 2019-10-31 PROCEDURE — 93299 CARDIAC DEVICE CHECK - REMOTE: ICD-10-PCS | Mod: S$GLB,,, | Performed by: INTERNAL MEDICINE

## 2019-10-31 PROCEDURE — 93299 CARDIAC DEVICE CHECK - REMOTE: CPT | Mod: S$GLB,,, | Performed by: INTERNAL MEDICINE

## 2019-11-04 ENCOUNTER — OFFICE VISIT (OUTPATIENT)
Dept: FAMILY MEDICINE | Facility: CLINIC | Age: 84
End: 2019-11-04
Payer: MEDICARE

## 2019-11-04 VITALS
HEIGHT: 64 IN | WEIGHT: 125.69 LBS | OXYGEN SATURATION: 97 % | SYSTOLIC BLOOD PRESSURE: 138 MMHG | BODY MASS INDEX: 21.46 KG/M2 | TEMPERATURE: 98 F | DIASTOLIC BLOOD PRESSURE: 58 MMHG | HEART RATE: 66 BPM

## 2019-11-04 DIAGNOSIS — E11.59 HYPERTENSION ASSOCIATED WITH DIABETES: ICD-10-CM

## 2019-11-04 DIAGNOSIS — J44.1 COPD EXACERBATION: Primary | ICD-10-CM

## 2019-11-04 DIAGNOSIS — I15.2 HYPERTENSION ASSOCIATED WITH DIABETES: ICD-10-CM

## 2019-11-04 DIAGNOSIS — F17.200 TOBACCO DEPENDENCE: ICD-10-CM

## 2019-11-04 DIAGNOSIS — Z99.81 ON HOME OXYGEN THERAPY: ICD-10-CM

## 2019-11-04 DIAGNOSIS — E03.9 HYPOTHYROIDISM, UNSPECIFIED TYPE: ICD-10-CM

## 2019-11-04 PROCEDURE — 99213 PR OFFICE/OUTPT VISIT, EST, LEVL III, 20-29 MIN: ICD-10-PCS | Mod: S$GLB,,, | Performed by: NURSE PRACTITIONER

## 2019-11-04 PROCEDURE — 99999 PR PBB SHADOW E&M-EST. PATIENT-LVL III: ICD-10-PCS | Mod: PBBFAC,,, | Performed by: NURSE PRACTITIONER

## 2019-11-04 PROCEDURE — 1101F PT FALLS ASSESS-DOCD LE1/YR: CPT | Mod: CPTII,S$GLB,, | Performed by: NURSE PRACTITIONER

## 2019-11-04 PROCEDURE — 99999 PR PBB SHADOW E&M-EST. PATIENT-LVL III: CPT | Mod: PBBFAC,,, | Performed by: NURSE PRACTITIONER

## 2019-11-04 PROCEDURE — 1101F PR PT FALLS ASSESS DOC 0-1 FALLS W/OUT INJ PAST YR: ICD-10-PCS | Mod: CPTII,S$GLB,, | Performed by: NURSE PRACTITIONER

## 2019-11-04 PROCEDURE — 99213 OFFICE O/P EST LOW 20 MIN: CPT | Mod: S$GLB,,, | Performed by: NURSE PRACTITIONER

## 2019-11-04 RX ORDER — LEVOTHYROXINE SODIUM 88 UG/1
88 TABLET ORAL
Qty: 90 TABLET | Refills: 3 | Status: SHIPPED | OUTPATIENT
Start: 2019-11-04 | End: 2019-11-29 | Stop reason: SDUPTHER

## 2019-11-05 DIAGNOSIS — R79.83 HOMOCYSTEINEMIA: ICD-10-CM

## 2019-11-05 DIAGNOSIS — Z15.89 HETEROZYGOUS MTHFR MUTATION C677T: ICD-10-CM

## 2019-11-05 RX ORDER — FOLIC ACID-PYRIDOXINE-CYANOCOBALAMIN TAB 2.5-25-2 MG 2.5-25-2 MG
TAB ORAL
Qty: 30 TABLET | Refills: 6 | Status: SHIPPED | OUTPATIENT
Start: 2019-11-05 | End: 2019-11-29 | Stop reason: SDUPTHER

## 2019-11-27 ENCOUNTER — TELEPHONE (OUTPATIENT)
Dept: FAMILY MEDICINE | Facility: CLINIC | Age: 84
End: 2019-11-27

## 2019-11-27 DIAGNOSIS — E11.40 TYPE 2 DIABETES MELLITUS WITH DIABETIC NEUROPATHY, WITHOUT LONG-TERM CURRENT USE OF INSULIN: ICD-10-CM

## 2019-11-27 DIAGNOSIS — E03.9 HYPOTHYROIDISM, UNSPECIFIED TYPE: ICD-10-CM

## 2019-11-27 DIAGNOSIS — Z15.89 HETEROZYGOUS MTHFR MUTATION C677T: ICD-10-CM

## 2019-11-27 DIAGNOSIS — R79.83 HOMOCYSTEINEMIA: ICD-10-CM

## 2019-11-27 DIAGNOSIS — D50.0 IRON DEFICIENCY ANEMIA DUE TO CHRONIC BLOOD LOSS: ICD-10-CM

## 2019-11-27 NOTE — TELEPHONE ENCOUNTER
----- Message from Adelaide Eddy sent at 11/27/2019 10:00 AM CST -----  Contact: Patient  Patient called in regards to a letter she received about her medication gabapentin (NEURONTIN) 300 MG capsule changing?     She is very confused and would like to speak to someone.  Please call back to explain at: [phone]

## 2019-11-27 NOTE — TELEPHONE ENCOUNTER
Spoke with patient.  [employer] has raised co pay for gabepentin.  Would like any medications that can be sent for 90 days to [facility].

## 2019-11-29 RX ORDER — AMLODIPINE BESYLATE 5 MG/1
5 TABLET ORAL DAILY
Qty: 90 TABLET | Refills: 3 | Status: SHIPPED | OUTPATIENT
Start: 2019-11-29 | End: 2019-12-10 | Stop reason: SDUPTHER

## 2019-11-29 RX ORDER — FUROSEMIDE 20 MG/1
TABLET ORAL
Qty: 24 TABLET | Refills: 3 | Status: SHIPPED | OUTPATIENT
Start: 2019-11-29 | End: 2020-02-14

## 2019-11-29 RX ORDER — FERROUS SULFATE 325(65) MG
325 TABLET ORAL 2 TIMES DAILY WITH MEALS
Qty: 180 TABLET | Refills: 3 | Status: SHIPPED | OUTPATIENT
Start: 2019-11-29 | End: 2021-03-23

## 2019-11-29 RX ORDER — MONTELUKAST SODIUM 10 MG/1
10 TABLET ORAL DAILY
Qty: 90 TABLET | Refills: 3 | Status: SHIPPED | OUTPATIENT
Start: 2019-11-29 | End: 2020-02-10

## 2019-11-29 RX ORDER — GABAPENTIN 300 MG/1
300 CAPSULE ORAL NIGHTLY
Qty: 90 CAPSULE | Refills: 3 | Status: SHIPPED | OUTPATIENT
Start: 2019-11-29 | End: 2021-03-01

## 2019-11-29 RX ORDER — GLIPIZIDE 2.5 MG/1
2.5 TABLET, EXTENDED RELEASE ORAL
Qty: 90 TABLET | Refills: 3 | Status: SHIPPED | OUTPATIENT
Start: 2019-11-29 | End: 2020-02-06 | Stop reason: SDUPTHER

## 2019-11-29 RX ORDER — LANOLIN ALCOHOL/MO/W.PET/CERES
1 CREAM (GRAM) TOPICAL DAILY
Qty: 90 TABLET | Refills: 3 | Status: SHIPPED | OUTPATIENT
Start: 2019-11-29 | End: 2020-01-02

## 2019-11-29 RX ORDER — SIMVASTATIN 40 MG/1
TABLET, FILM COATED ORAL
Qty: 90 TABLET | Refills: 3 | Status: SHIPPED | OUTPATIENT
Start: 2019-11-29 | End: 2020-04-14 | Stop reason: SDUPTHER

## 2019-11-29 RX ORDER — LEVOTHYROXINE SODIUM 88 UG/1
88 TABLET ORAL
Qty: 90 TABLET | Refills: 3 | Status: SHIPPED | OUTPATIENT
Start: 2019-11-29 | End: 2019-12-29

## 2019-11-29 RX ORDER — SERTRALINE HYDROCHLORIDE 25 MG/1
25 TABLET, FILM COATED ORAL DAILY
Qty: 90 TABLET | Refills: 3 | Status: SHIPPED | OUTPATIENT
Start: 2019-11-29 | End: 2020-05-06 | Stop reason: SDUPTHER

## 2019-11-29 NOTE — TELEPHONE ENCOUNTER
Spoke to pt and informed all meds were sent in by Dr Edmonds to [facility]. Pt verbalized understanding. Pt stated she will let Dr Edmonds know when she needs her nebulizer meds sent as well. Confirmed f/u appt with pt on 12/10/19 at 1:40pm

## 2019-11-29 NOTE — TELEPHONE ENCOUNTER
All chronic meds and gabapentin sent to [facility].  Did not send nebulizer meds. Do these need to be sent as well?

## 2019-12-10 ENCOUNTER — TELEPHONE (OUTPATIENT)
Dept: FAMILY MEDICINE | Facility: CLINIC | Age: 84
End: 2019-12-10

## 2019-12-10 DIAGNOSIS — N39.0 URINARY TRACT INFECTION, SITE NOT SPECIFIED: ICD-10-CM

## 2019-12-10 DIAGNOSIS — L21.9 SEBORRHEIC DERMATITIS OF SCALP: Primary | ICD-10-CM

## 2019-12-10 DIAGNOSIS — N18.9 ANEMIA OF CHRONIC RENAL FAILURE: ICD-10-CM

## 2019-12-10 DIAGNOSIS — M81.0 SENILE OSTEOPOROSIS: ICD-10-CM

## 2019-12-10 DIAGNOSIS — D64.9 ANEMIA, UNSPECIFIED: ICD-10-CM

## 2019-12-10 DIAGNOSIS — D63.1 ANEMIA, CHRONIC RENAL FAILURE, STAGE 3 (MODERATE): ICD-10-CM

## 2019-12-10 DIAGNOSIS — N17.9 ACUTE KIDNEY FAILURE, UNSPECIFIED: Primary | ICD-10-CM

## 2019-12-10 DIAGNOSIS — D63.1 ANEMIA OF CHRONIC RENAL FAILURE: ICD-10-CM

## 2019-12-10 DIAGNOSIS — N18.30 ANEMIA, CHRONIC RENAL FAILURE, STAGE 3 (MODERATE): ICD-10-CM

## 2019-12-10 RX ORDER — AMLODIPINE BESYLATE 5 MG/1
TABLET ORAL
Qty: 90 TABLET | Refills: 2 | Status: SHIPPED | OUTPATIENT
Start: 2019-12-10 | End: 2020-04-28

## 2019-12-10 NOTE — TELEPHONE ENCOUNTER
Received PA request for clobetasol solution that you prescribed for this patient.  If PA is attempted will need to know what medications have been tried and failed.   Please advise how you would like to proceed.

## 2019-12-11 RX ORDER — FLUOCINOLONE ACETONIDE 0.1 MG/ML
SOLUTION TOPICAL 2 TIMES DAILY
Qty: 90 ML | Refills: 1 | Status: SHIPPED | OUTPATIENT
Start: 2019-12-11 | End: 2020-11-09

## 2019-12-11 NOTE — TELEPHONE ENCOUNTER
According to the [facility] 2019 formulary fluocinolone acet 0.01% solution is a covered medication.

## 2019-12-14 ENCOUNTER — LAB VISIT (OUTPATIENT)
Dept: LAB | Facility: HOSPITAL | Age: 84
End: 2019-12-14
Attending: FAMILY MEDICINE
Payer: MEDICARE

## 2019-12-14 DIAGNOSIS — N18.30 TYPE 2 DIABETES MELLITUS WITH STAGE 3 CHRONIC KIDNEY DISEASE, WITHOUT LONG-TERM CURRENT USE OF INSULIN: ICD-10-CM

## 2019-12-14 DIAGNOSIS — E03.9 HYPOTHYROIDISM, UNSPECIFIED TYPE: ICD-10-CM

## 2019-12-14 DIAGNOSIS — E11.22 TYPE 2 DIABETES MELLITUS WITH STAGE 3 CHRONIC KIDNEY DISEASE, WITHOUT LONG-TERM CURRENT USE OF INSULIN: ICD-10-CM

## 2019-12-14 DIAGNOSIS — E78.5 HYPERLIPIDEMIA ASSOCIATED WITH TYPE 2 DIABETES MELLITUS: ICD-10-CM

## 2019-12-14 DIAGNOSIS — E11.69 HYPERLIPIDEMIA ASSOCIATED WITH TYPE 2 DIABETES MELLITUS: ICD-10-CM

## 2019-12-14 LAB
ALBUMIN SERPL BCP-MCNC: 3.4 G/DL (ref 3.5–5.2)
ALP SERPL-CCNC: 66 U/L (ref 55–135)
ALT SERPL W/O P-5'-P-CCNC: 14 U/L (ref 10–44)
ANION GAP SERPL CALC-SCNC: 8 MMOL/L (ref 8–16)
AST SERPL-CCNC: 23 U/L (ref 10–40)
BASOPHILS # BLD AUTO: 0.06 K/UL (ref 0–0.2)
BASOPHILS NFR BLD: 0.9 % (ref 0–1.9)
BILIRUB SERPL-MCNC: 0.4 MG/DL (ref 0.1–1)
BUN SERPL-MCNC: 78 MG/DL (ref 8–23)
CALCIUM SERPL-MCNC: 9.4 MG/DL (ref 8.7–10.5)
CHLORIDE SERPL-SCNC: 106 MMOL/L (ref 95–110)
CHOLEST SERPL-MCNC: 186 MG/DL (ref 120–199)
CHOLEST/HDLC SERPL: 3.2 {RATIO} (ref 2–5)
CO2 SERPL-SCNC: 28 MMOL/L (ref 23–29)
CREAT SERPL-MCNC: 2 MG/DL (ref 0.5–1.4)
DIFFERENTIAL METHOD: ABNORMAL
EOSINOPHIL # BLD AUTO: 0.8 K/UL (ref 0–0.5)
EOSINOPHIL NFR BLD: 11.1 % (ref 0–8)
ERYTHROCYTE [DISTWIDTH] IN BLOOD BY AUTOMATED COUNT: 13.4 % (ref 11.5–14.5)
EST. GFR  (AFRICAN AMERICAN): 25 ML/MIN/1.73 M^2
EST. GFR  (NON AFRICAN AMERICAN): 21.7 ML/MIN/1.73 M^2
ESTIMATED AVG GLUCOSE: 126 MG/DL (ref 68–131)
GLUCOSE SERPL-MCNC: 98 MG/DL (ref 70–110)
HBA1C MFR BLD HPLC: 6 % (ref 4–5.6)
HCT VFR BLD AUTO: 32.7 % (ref 37–48.5)
HDLC SERPL-MCNC: 58 MG/DL (ref 40–75)
HDLC SERPL: 31.2 % (ref 20–50)
HGB BLD-MCNC: 10.1 G/DL (ref 12–16)
IMM GRANULOCYTES # BLD AUTO: 0.02 K/UL (ref 0–0.04)
IMM GRANULOCYTES NFR BLD AUTO: 0.3 % (ref 0–0.5)
LDLC SERPL CALC-MCNC: 105 MG/DL (ref 63–159)
LYMPHOCYTES # BLD AUTO: 2.3 K/UL (ref 1–4.8)
LYMPHOCYTES NFR BLD: 34.5 % (ref 18–48)
MCH RBC QN AUTO: 31 PG (ref 27–31)
MCHC RBC AUTO-ENTMCNC: 30.9 G/DL (ref 32–36)
MCV RBC AUTO: 100 FL (ref 82–98)
MONOCYTES # BLD AUTO: 0.7 K/UL (ref 0.3–1)
MONOCYTES NFR BLD: 10.4 % (ref 4–15)
NEUTROPHILS # BLD AUTO: 2.9 K/UL (ref 1.8–7.7)
NEUTROPHILS NFR BLD: 42.8 % (ref 38–73)
NONHDLC SERPL-MCNC: 128 MG/DL
NRBC BLD-RTO: 0 /100 WBC
PLATELET # BLD AUTO: 213 K/UL (ref 150–350)
PMV BLD AUTO: 11.3 FL (ref 9.2–12.9)
POTASSIUM SERPL-SCNC: 4.5 MMOL/L (ref 3.5–5.1)
PROT SERPL-MCNC: 6.6 G/DL (ref 6–8.4)
RBC # BLD AUTO: 3.26 M/UL (ref 4–5.4)
SODIUM SERPL-SCNC: 142 MMOL/L (ref 136–145)
T4 FREE SERPL-MCNC: 0.89 NG/DL (ref 0.71–1.51)
TRIGL SERPL-MCNC: 115 MG/DL (ref 30–150)
TSH SERPL DL<=0.005 MIU/L-ACNC: 0.96 UIU/ML (ref 0.4–4)
WBC # BLD AUTO: 6.76 K/UL (ref 3.9–12.7)

## 2019-12-14 PROCEDURE — 80061 LIPID PANEL: CPT

## 2019-12-14 PROCEDURE — 36415 COLL VENOUS BLD VENIPUNCTURE: CPT | Mod: PO

## 2019-12-14 PROCEDURE — 84443 ASSAY THYROID STIM HORMONE: CPT

## 2019-12-14 PROCEDURE — 83036 HEMOGLOBIN GLYCOSYLATED A1C: CPT

## 2019-12-14 PROCEDURE — 80053 COMPREHEN METABOLIC PANEL: CPT

## 2019-12-14 PROCEDURE — 84439 ASSAY OF FREE THYROXINE: CPT

## 2019-12-14 PROCEDURE — 85025 COMPLETE CBC W/AUTO DIFF WBC: CPT

## 2019-12-23 RX ORDER — SIMVASTATIN 40 MG/1
TABLET, FILM COATED ORAL
Refills: 0 | OUTPATIENT
Start: 2019-12-23

## 2019-12-27 ENCOUNTER — TELEPHONE (OUTPATIENT)
Dept: FAMILY MEDICINE | Facility: CLINIC | Age: 84
End: 2019-12-27

## 2019-12-27 NOTE — TELEPHONE ENCOUNTER
----- Message from Princess ADALGISA Rodriguez sent at 12/27/2019 11:32 AM CST -----  Contact: Patient  Type: Needs Medical Advice    Who Called:Patient  Best Call Back Number:   Additional Information: Requesting a call back in regards to patient was seen on 12/10/19 but she received a letter state she was a no show. Please call to advise the patient to ensure she wouldn't be charged.

## 2019-12-27 NOTE — TELEPHONE ENCOUNTER
Left a detailed voicemail informing that no charges were made for the missed visit on 12/10/19. Requested patient call back to reschedule the missed appointment.

## 2019-12-29 DIAGNOSIS — N17.9 AKI (ACUTE KIDNEY INJURY): Primary | ICD-10-CM

## 2019-12-31 DIAGNOSIS — D50.0 IRON DEFICIENCY ANEMIA DUE TO CHRONIC BLOOD LOSS: ICD-10-CM

## 2020-01-02 RX ORDER — LANOLIN ALCOHOL/MO/W.PET/CERES
CREAM (GRAM) TOPICAL
Qty: 90 TABLET | Refills: 3 | Status: SHIPPED | OUTPATIENT
Start: 2020-01-02 | End: 2021-01-09

## 2020-01-02 RX ORDER — FERROUS SULFATE 325(65) MG
TABLET ORAL
Refills: 0 | OUTPATIENT
Start: 2020-01-02

## 2020-01-08 ENCOUNTER — OFFICE VISIT (OUTPATIENT)
Dept: FAMILY MEDICINE | Facility: CLINIC | Age: 85
End: 2020-01-08
Payer: MEDICARE

## 2020-01-08 VITALS
RESPIRATION RATE: 14 BRPM | BODY MASS INDEX: 21.38 KG/M2 | SYSTOLIC BLOOD PRESSURE: 160 MMHG | DIASTOLIC BLOOD PRESSURE: 60 MMHG | TEMPERATURE: 98 F | HEART RATE: 64 BPM | HEIGHT: 64 IN | WEIGHT: 125.25 LBS | OXYGEN SATURATION: 95 %

## 2020-01-08 DIAGNOSIS — J44.1 COPD EXACERBATION: Primary | ICD-10-CM

## 2020-01-08 DIAGNOSIS — I15.2 HYPERTENSION ASSOCIATED WITH DIABETES: ICD-10-CM

## 2020-01-08 DIAGNOSIS — I05.0 MITRAL VALVE STENOSIS, UNSPECIFIED ETIOLOGY: ICD-10-CM

## 2020-01-08 DIAGNOSIS — E11.59 HYPERTENSION ASSOCIATED WITH DIABETES: ICD-10-CM

## 2020-01-08 DIAGNOSIS — I35.0 NONRHEUMATIC AORTIC VALVE STENOSIS: ICD-10-CM

## 2020-01-08 PROCEDURE — 1126F AMNT PAIN NOTED NONE PRSNT: CPT | Mod: S$GLB,,, | Performed by: FAMILY MEDICINE

## 2020-01-08 PROCEDURE — 1159F MED LIST DOCD IN RCRD: CPT | Mod: S$GLB,,, | Performed by: FAMILY MEDICINE

## 2020-01-08 PROCEDURE — 1101F PR PT FALLS ASSESS DOC 0-1 FALLS W/OUT INJ PAST YR: ICD-10-PCS | Mod: CPTII,S$GLB,, | Performed by: FAMILY MEDICINE

## 2020-01-08 PROCEDURE — 99999 PR PBB SHADOW E&M-EST. PATIENT-LVL IV: CPT | Mod: PBBFAC,,, | Performed by: FAMILY MEDICINE

## 2020-01-08 PROCEDURE — 1101F PT FALLS ASSESS-DOCD LE1/YR: CPT | Mod: CPTII,S$GLB,, | Performed by: FAMILY MEDICINE

## 2020-01-08 PROCEDURE — 99213 PR OFFICE/OUTPT VISIT, EST, LEVL III, 20-29 MIN: ICD-10-PCS | Mod: S$GLB,,, | Performed by: FAMILY MEDICINE

## 2020-01-08 PROCEDURE — 1159F PR MEDICATION LIST DOCUMENTED IN MEDICAL RECORD: ICD-10-PCS | Mod: S$GLB,,, | Performed by: FAMILY MEDICINE

## 2020-01-08 PROCEDURE — 99999 PR PBB SHADOW E&M-EST. PATIENT-LVL IV: ICD-10-PCS | Mod: PBBFAC,,, | Performed by: FAMILY MEDICINE

## 2020-01-08 PROCEDURE — 99499 RISK ADDL DX/OHS AUDIT: ICD-10-PCS | Mod: S$GLB,,, | Performed by: FAMILY MEDICINE

## 2020-01-08 PROCEDURE — 1126F PR PAIN SEVERITY QUANTIFIED, NO PAIN PRESENT: ICD-10-PCS | Mod: S$GLB,,, | Performed by: FAMILY MEDICINE

## 2020-01-08 PROCEDURE — 99213 OFFICE O/P EST LOW 20 MIN: CPT | Mod: S$GLB,,, | Performed by: FAMILY MEDICINE

## 2020-01-08 PROCEDURE — 99499 UNLISTED E&M SERVICE: CPT | Mod: S$GLB,,, | Performed by: FAMILY MEDICINE

## 2020-01-08 RX ORDER — PREDNISONE 20 MG/1
20 TABLET ORAL DAILY
Qty: 5 TABLET | Refills: 0 | Status: SHIPPED | OUTPATIENT
Start: 2020-01-08 | End: 2020-01-21 | Stop reason: SDUPTHER

## 2020-01-08 RX ORDER — LEVOTHYROXINE SODIUM 88 UG/1
88 TABLET ORAL DAILY
COMMUNITY
End: 2020-12-14 | Stop reason: SDUPTHER

## 2020-01-08 RX ORDER — MOXIFLOXACIN HYDROCHLORIDE 400 MG/1
400 TABLET ORAL DAILY
Qty: 5 TABLET | Refills: 0 | Status: SHIPPED | OUTPATIENT
Start: 2020-01-08 | End: 2020-02-28

## 2020-01-08 NOTE — PROGRESS NOTES
Subjective:       Patient ID: Blaire Cage is a 89 y.o. female.    Chief Complaint: Follow-up (4wk f/u )    HPI  Review of Systems   Constitutional: Positive for fatigue. Negative for activity change, chills, fever and unexpected weight change.   HENT: Positive for congestion and postnasal drip. Negative for ear discharge, ear pain, hearing loss, rhinorrhea, sinus pressure, sneezing, sore throat and trouble swallowing.    Eyes: Negative for discharge and visual disturbance.   Respiratory: Positive for cough, shortness of breath and wheezing. Negative for chest tightness.    Cardiovascular: Negative for chest pain and palpitations.   Gastrointestinal: Negative for blood in stool, constipation, diarrhea and vomiting.   Endocrine: Negative for polydipsia and polyuria.   Genitourinary: Negative for difficulty urinating, dysuria, hematuria and menstrual problem.   Musculoskeletal: Negative for arthralgias, joint swelling and neck pain.   Neurological: Negative for weakness and headaches.   Psychiatric/Behavioral: Negative for confusion and dysphoric mood.       Patient Active Problem List   Diagnosis    Diabetic neuropathy, type II diabetes mellitus    CKD (chronic kidney disease), stage III, eGFR 39, progressive 31    Hypothyroidism    Hypertension associated with diabetes    Hyperlipidemia associated with type 2 diabetes mellitus    Type 2 diabetes mellitus with stage 3 chronic kidney disease    Right carotid bruit    COPD mixed type    Anxiety    Tobacco dependence    Abdominal aortic aneurysm without rupture    Hip arthritis    Degenerative spinal arthritis    Osteoporosis    Left ventricular diastolic dysfunction with preserved systolic function    Hypertensive left ventricular hypertrophy, without heart failure    Smoker, quit 5/2016, 25 pck-years    Abdominal obesity    Absolute anemia    Body mass index (BMI) 23.0-23.9, adult, today 24.1    Iron deficiency anemia due to chronic blood  loss    Proteinuria due to type 2 diabetes mellitus    History of syncope in childhood    Prerenal azotemia    Drug-induced constipation    COPD with acute exacerbation    Asthmatic bronchitis with acute exacerbation    Shortness of breath    Controlled type 2 diabetes mellitus, without long-term current use of insulin    Acute pulmonary embolism    SOB (shortness of breath)    Asthma-COPD overlap syndrome    Eosinophilic asthma    Moderate malnutrition    Type 2 diabetes mellitus with kidney complication, without long-term current use of insulin    Chronic anticoagulation    Constipation    DVT (deep venous thrombosis)    History of pulmonary embolism    Anemia due to multiple mechanisms    Anemia, chronic disease    Normocytic normochromic anemia    Anemia, chronic renal failure, stage 3 (moderate)    Homocysteinemia    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    Hyperkalemia    Diastolic CHF, chronic    Anemia of chronic disease    Kidney stones    Bradycardia    Macrocytic anemia    Orthostatic hypotension    Closed left hip fracture    Hip pain, left    On home oxygen therapy     Patient is here for a chronic conditions follow up.    Cough prod x 6-7 days and having mild sob, wheezing. Using trelegy everyday and using albuterol once a day. Started on trelegy 4 weeks ago.  Was seeing improvement in control of her daily sx until got sick.  It is expensive though but wants to stay on it     Reviewed 12/19   Card Dr. Herbert AAA  Heme/Onc Dr. Alvarez iron def anemia, h/o DVT and PE with MTHFR-C heterozygous + on eliquis  Pulm Dr. Christopher- asthma -COPD  GI Dr. Flores-   Nephro Dr. Jo -CKD stage 3 b, anemia of chronic diz, hyperuricemia     Use walker or wheelchair due to anxiety about falling        Eye specialist scheduled in 1/20 =20/20 eye care-due for one.  Will make appt  Objective:      Physical Exam   Constitutional: She appears well-developed and well-nourished.   Cardiovascular:  Normal rate and regular rhythm.   Murmur heard.   Systolic murmur is present.  Pulmonary/Chest: Effort normal. She has decreased breath sounds. She has wheezes.   Skin: Skin is warm and dry.   Psychiatric: She has a normal mood and affect.   Nursing note and vitals reviewed.      Assessment:       1. COPD exacerbation    2. Hypertension associated with diabetes        Plan:         1. COPD exacerbation  Treat  - moxifloxacin (AVELOX) 400 mg tablet; Take 1 tablet (400 mg total) by mouth once daily.  Dispense: 5 tablet; Refill: 0  - predniSONE (DELTASONE) 20 MG tablet; Take 1 tablet (20 mg total) by mouth once daily.  Dispense: 5 tablet; Refill: 0  Cont trelegy daily and albuterol q4-6 h prn.  I counseled the patient on general home care guidelines for cough and congestion including increasing fluid intake, getting plenty of rest and use of OTC cough and cold medications.  I recommended guafenesin for congestion and dextromethorphan as directed for cough.  A brand like Mucinex DM is recommended.  Avoidance of decongestants is recommended for patients with heart problems and hypertension.  Extra vitamin C may also benefit.  Return to clinic if symptoms last longer than 10 days or sooner if worsen with symptoms like fever > 100.4, severe sinus pain or headache, thick yellow nasal discharge or sputum, dehydration or lethargy.         2. Hypertension associated with diabetes  Uncontrolled today but not typical. Cont current meds and recheck nurse BP 4 weeks    Time spent with patient: 20 minutes    Patient with be reevaluated in 3 months or sooner prn    Greater than 50% of this visit was spent counseling as described in above documentation:Yes

## 2020-01-08 NOTE — PATIENT INSTRUCTIONS
Established High Blood Pressure    High blood pressure (hypertension) is a chronic disease. Often, healthcare providers dont know what causes it. But it can be caused by certain health conditions and medicines.  If you have high blood pressure, you may not have any symptoms. If you do have symptoms, they may include headache, dizziness, changes in your vision, chest pain, and shortness of breath. But even without symptoms, high blood pressure thats not treated raises your risk for heart attack and stroke. High blood pressure is a serious health risk and shouldnt be ignored.  A blood pressure reading is made up of two numbers: a higher number over a lower number. The top number is the systolic pressure. The bottom number is the diastolic pressure. A normal blood pressure is a systolic pressure of  less than 120 over a diastolic pressure of less than 80. You will see your blood pressure readings written together. For example, a person with a systolic pressure of 188 and a diastolic pressure of 78 will have 118/78 written in the medical record.  High blood pressure is when either the top number is 140 or higher, or the bottom number is 90 or higher. This must be the result when taking your blood pressure a number of times. The blood pressures between normal and high are called prehypertension.  Home care  If you have high blood pressure, you should do what is listed below to lower your blood pressure. If you are taking medicines for high blood pressure, these methods may reduce or end your need for medicines in the future.  · Begin a weight-loss program if you are overweight.  · Cut back on how much salt you get in your diet. Heres how to do this:  ¨ Dont eat foods that have a lot of salt. These include olives, pickles, smoked meats, and salted potato chips.  ¨ Dont add salt to your food at the table.  ¨ Use only small amounts of salt when cooking.  · Start an exercise program. Talk with your healthcare  provider about the type of exercise program that would be best for you. It doesn't have to be hard. Even brisk walking for 20 minutes 3 times a week is a good form of exercise.  · Dont take medicines that stimulate the heart. This includes many over-the-counter cold and sinus decongestant pills and sprays, as well as diet pills. Check the warnings about hypertension on the label. Before buying any over-the-counter medicines or supplements, always ask the pharmacist about the product's potential interaction with your high blood pressure and your high blood pressure medicines.  · Stimulants such as amphetamine or cocaine could be deadly for someone with high blood pressure. Never take these.  · Limit how much caffeine you get in your diet. Switch to caffeine-free products.  · Stop smoking. If you are a long-time smoker, this can be hard. Talk to your healthcare provider about medicines and nicotine replacement options to help you. Also, enroll in a stop-smoking program to make it more likely that you will quit for good.  · Learn how to handle stress. This is an important part of any program to lower blood pressure. Learn about relaxation methods like meditation, yoga, or biofeedback.  · If your provider prescribed medicines, take them exactly as directed. Missing doses may cause your blood pressure get out of control.  · If you miss a dose or doses, check with your healthcare provider or pharmacist about what to do.  · Consider buying an automatic blood pressure machine. Ask your provider for a recommendation. You can get one of these at most pharmacies.     The American Heart Association recommends the following guidelines for home blood pressure monitoring:  · Don't smoke or drink coffee for 30 minutes before taking your blood pressure.  · Go to the bathroom before the test.  · Relax for 5 minutes before taking the measurement.  · Sit with your back supported (don't sit on a couch or soft chair); keep your feet on  the floor uncrossed. Place your arm on a solid flat surface (like a table) with the upper part of the arm at heart level. Place the middle of the cuff directly above the eye of the elbow. Check the monitor's instruction manual for an illustration.  · Take multiple readings. When you measure, take 2 to 3 readings one minute apart and record all of the results.  · Take your blood pressure at the same time every day, or as your healthcare provider recommends.  · Record the date, time, and blood pressure reading.  · Take the record with you to your next medical appointment. If your blood pressure monitor has a built-in memory, simply take the monitor with you to your next appointment.  · Call your provider if you have several high readings. Don't be frightened by a single high blood pressure reading, but if you get several high readings, check in with your healthcare provider.  · Note: When blood pressure reaches a systolic (top number) of 180 or higher OR diastolic (bottom number) of 110 or higher, seek emergency medical treatment.  Follow-up care  You will need to see your healthcare provider regularly. This is to check your blood pressure and to make changes to your medicines. Make a follow-up appointment as directed. Bring the record of your home blood pressure readings to the appointment.  When to seek medical advice  Call your healthcare provider right away if any of these occur:  · Blood pressure reaches a systolic (upper number) of 180 or higher OR a diastolic (bottom number) of 110 or higher  · Chest pain or shortness of breath  · Severe headache  · Throbbing or rushing sound in the ears  · Nosebleed  · Sudden severe pain in your belly (abdomen)  · Extreme drowsiness, confusion, or fainting  · Dizziness or spinning sensation (vertigo)  · Weakness of an arm or leg or one side of the face  · You have problems speaking or seeing   Date Last Reviewed: 12/1/2016  © 5494-1064 [employer]. [address] [place] [address]  [place] [address]. All rights reserved. This information is not intended as a substitute for professional medical care. Always follow your healthcare professional's instructions.

## 2020-01-09 NOTE — PROGRESS NOTES
Patient, Blaire Cage (MRN #[id number]), presented with a recent Estimated Glumerular Filtration Rate (EGFR) between 15 and 29 consistent with the definition of chronic kidney disease stage 4 (ICD10 - N18.4).    eGFR if    Date Value Ref Range Status   12/14/2019 25.0 (A) >60 mL/min/1.73 m^2 Final       The patient's chronic kidney disease stage 4 was monitored, evaluated, addressed and/or treated. This addendum to the medical record is made on 01/09/2020.

## 2020-01-15 ENCOUNTER — OFFICE VISIT (OUTPATIENT)
Dept: HEMATOLOGY/ONCOLOGY | Facility: CLINIC | Age: 85
End: 2020-01-15
Payer: MEDICARE

## 2020-01-15 VITALS
RESPIRATION RATE: 19 BRPM | WEIGHT: 129 LBS | SYSTOLIC BLOOD PRESSURE: 142 MMHG | TEMPERATURE: 98 F | DIASTOLIC BLOOD PRESSURE: 52 MMHG | BODY MASS INDEX: 22.14 KG/M2 | HEART RATE: 65 BPM

## 2020-01-15 DIAGNOSIS — D63.1 ANEMIA, CHRONIC RENAL FAILURE, STAGE 3 (MODERATE): ICD-10-CM

## 2020-01-15 DIAGNOSIS — D63.8 ANEMIA, CHRONIC DISEASE: ICD-10-CM

## 2020-01-15 DIAGNOSIS — D53.9 MACROCYTIC ANEMIA: ICD-10-CM

## 2020-01-15 DIAGNOSIS — D53.9 NUTRITIONAL ANEMIA, UNSPECIFIED: ICD-10-CM

## 2020-01-15 DIAGNOSIS — D64.9 ANEMIA, UNSPECIFIED TYPE: ICD-10-CM

## 2020-01-15 DIAGNOSIS — D50.0 IRON DEFICIENCY ANEMIA DUE TO CHRONIC BLOOD LOSS: ICD-10-CM

## 2020-01-15 DIAGNOSIS — D63.8 ANEMIA OF CHRONIC DISEASE: ICD-10-CM

## 2020-01-15 DIAGNOSIS — Z15.89 HETEROZYGOUS MTHFR MUTATION C677T: ICD-10-CM

## 2020-01-15 DIAGNOSIS — I82.4Y2 DEEP VEIN THROMBOSIS (DVT) OF PROXIMAL VEIN OF LEFT LOWER EXTREMITY, UNSPECIFIED CHRONICITY: ICD-10-CM

## 2020-01-15 DIAGNOSIS — N18.30 ANEMIA, CHRONIC RENAL FAILURE, STAGE 3 (MODERATE): ICD-10-CM

## 2020-01-15 DIAGNOSIS — D64.9 NORMOCYTIC NORMOCHROMIC ANEMIA: ICD-10-CM

## 2020-01-15 DIAGNOSIS — Z86.711 HISTORY OF PULMONARY EMBOLISM: Primary | ICD-10-CM

## 2020-01-15 DIAGNOSIS — D64.89 ANEMIA DUE TO MULTIPLE MECHANISMS: ICD-10-CM

## 2020-01-15 DIAGNOSIS — F17.200 SMOKER: ICD-10-CM

## 2020-01-15 DIAGNOSIS — I26.99 OTHER ACUTE PULMONARY EMBOLISM WITHOUT ACUTE COR PULMONALE: ICD-10-CM

## 2020-01-15 DIAGNOSIS — Z79.01 CHRONIC ANTICOAGULATION: ICD-10-CM

## 2020-01-15 DIAGNOSIS — R79.83 HOMOCYSTEINEMIA: ICD-10-CM

## 2020-01-15 PROCEDURE — 99213 PR OFFICE/OUTPT VISIT, EST, LEVL III, 20-29 MIN: ICD-10-PCS | Mod: S$GLB,,, | Performed by: INTERNAL MEDICINE

## 2020-01-15 PROCEDURE — 1126F AMNT PAIN NOTED NONE PRSNT: CPT | Mod: S$GLB,,, | Performed by: INTERNAL MEDICINE

## 2020-01-15 PROCEDURE — 1101F PR PT FALLS ASSESS DOC 0-1 FALLS W/OUT INJ PAST YR: ICD-10-PCS | Mod: S$GLB,,, | Performed by: INTERNAL MEDICINE

## 2020-01-15 PROCEDURE — 1126F PR PAIN SEVERITY QUANTIFIED, NO PAIN PRESENT: ICD-10-PCS | Mod: S$GLB,,, | Performed by: INTERNAL MEDICINE

## 2020-01-15 PROCEDURE — 1159F PR MEDICATION LIST DOCUMENTED IN MEDICAL RECORD: ICD-10-PCS | Mod: S$GLB,,, | Performed by: INTERNAL MEDICINE

## 2020-01-15 PROCEDURE — 1159F MED LIST DOCD IN RCRD: CPT | Mod: S$GLB,,, | Performed by: INTERNAL MEDICINE

## 2020-01-15 PROCEDURE — 1101F PT FALLS ASSESS-DOCD LE1/YR: CPT | Mod: S$GLB,,, | Performed by: INTERNAL MEDICINE

## 2020-01-15 PROCEDURE — 99213 OFFICE O/P EST LOW 20 MIN: CPT | Mod: S$GLB,,, | Performed by: INTERNAL MEDICINE

## 2020-01-15 NOTE — PROGRESS NOTES
[facility] Hematology/Oncology  PROGRESS NOTE        Subjective:       Patient ID:   NAME: Blaire Cage : 1930     89 y.o. female    Referring Doc: Sandro  Other Physicians: Cande Garg (PA); Eli Edmonds (PCP); Jeffrey Christopher (Pulm); Sandra (GI)    Chief Complaint:  DVT and PE f/u    History of Present Illness:     Patient returns today for a  regularly scheduled follow-up visit.  The patient is here today to go over the results of the recently ordered labs, tests and studies. She is here with her daughter. She is breathing ok. She denies any swelling in the legs. She denies any CP, SOB, HA's.  She is in wheelchair today. Stomach doing ok.     ROS:   GEN: normal without any fever, night sweats or weight loss  HEENT: normal with no HA's, sore throat, stiff neck, changes in vision  CV: normal with no CP, SOB, PND, WEST or orthopnea  PULM: normal with no SOB, cough, hemoptysis, sputum or pleuritic pain  GI: some recent N/V, and diarrhea  : normal with no hematuria, dysuria  BREAST: normal with no mass, discharge, pain  SKIN: normal with no rash, erythema, bruising, or swelling    Allergies:  Review of patient's allergies indicates:   Allergen Reactions    Carvedilol Other (See Comments)     Bradycardia and syncope    Boniva [ibandronate]     Codeine     Hydralazine analogues     Iodinated contrast- oral and iv dye Hives    Kionex [sodium polystyrene sulfonate] Diarrhea     From encounter 17 for diarrhea--not true allergy.    Lisinopril     Morphine        Medications:    Current Outpatient Medications:     acetaminophen (TYLENOL) 325 MG tablet, Take 2 tablets (650 mg total) by mouth every 4 (four) hours as needed. (Patient taking differently: Take 650 mg by mouth every 4 (four) hours as needed for Pain. ), Disp: , Rfl: 0    albuterol (PROVENTIL) 2.5 mg /3 mL (0.083 %) nebulizer solution, Take 3 mLs (2.5 mg total) by nebulization every 6 (six) hours as needed for Wheezing. Rescue, Disp: 100 Box,  Rfl: 5    amLODIPine (NORVASC) 5 MG tablet, TAKE 1 TABLET BY MOUTH ONCE DAILY, Disp: 90 tablet, Rfl: 2    apixaban (ELIQUIS) 5 mg Tab, Take 1 tablet (5 mg total) by mouth 2 (two) times daily., Disp: 180 tablet, Rfl: 3    ascorbic acid (VITAMIN C) 500 MG tablet, Take 500 mg by mouth once daily.  , Disp: , Rfl:     aspirin (ECOTRIN) 81 MG EC tablet, Take 81 mg by mouth once daily.  , Disp: , Rfl:     calcium carbonate (OS-TASIA) 500 mg calcium (1,250 mg) tablet, Take 1 tablet by mouth 2 (two) times daily.  , Disp: , Rfl:     cyanocobalamin/folic acid (FOLTRATE ORAL), vitamin B12-folic acid  2.5 - 25-2MG, Disp: , Rfl:     docusate sodium (COLACE) 100 MG capsule, Take 1 capsule (100 mg total) by mouth 2 (two) times daily., Disp: 90 capsule, Rfl: 0    ferrous sulfate (FEOSOL) 325 mg (65 mg iron) Tab tablet, Take 1 tablet (325 mg total) by mouth 2 (two) times daily with meals., Disp: 180 tablet, Rfl: 3    fish oil-omega-3 fatty acids 300-1,000 mg capsule, Take 2 g by mouth every evening. , Disp: , Rfl:     fluocinolone (SYNALAR) 0.01 % external solution, Apply topically 2 (two) times daily., Disp: 90 mL, Rfl: 1    fluticasone (FLONASE) 50 mcg/actuation nasal spray, 2 sprays by Each Nare route once daily., Disp: 48 g, Rfl: 3    fluticasone-umeclidin-vilanter (TRELEGY ELLIPTA) 100-62.5-25 mcg DsDv, Inhale 1 puff into the lungs once daily., Disp: 60 each, Rfl: 11    folic acid-vit B6-vit B12 2.5-25-2 mg (FOLBIC) 2.5-25-2 mg Tab, Take 1 tablet by mouth once daily., Disp: 90 tablet, Rfl: 3    furosemide (LASIX) 20 MG tablet, Take 1 tablet on Tuesdays and Thursdays., Disp: 24 tablet, Rfl: 3    gabapentin (NEURONTIN) 300 MG capsule, Take 1 capsule (300 mg total) by mouth every evening., Disp: 90 capsule, Rfl: 3    glipiZIDE (GLUCOTROL) 2.5 MG TR24, Take 1 tablet (2.5 mg total) by mouth daily with breakfast., Disp: 90 tablet, Rfl: 3    levothyroxine (SYNTHROID) 88 MCG tablet, Take 88 mcg by mouth once daily., Disp:  , Rfl:     lidocaine (LIDODERM) 5 %, Place 1 patch onto the skin once daily. Remove & Discard patch within 12 hours or as directed by MD, Disp: 3 patch, Rfl: 0    magnesium oxide (MAG-OX) 400 mg (241.3 mg magnesium) tablet, TAKE 1 TABLET BY MOUTH ONCE DAILY, Disp: 90 tablet, Rfl: 3    montelukast (SINGULAIR) 10 mg tablet, Take 1 tablet (10 mg total) by mouth once daily., Disp: 90 tablet, Rfl: 3    moxifloxacin (AVELOX) 400 mg tablet, Take 1 tablet (400 mg total) by mouth once daily., Disp: 5 tablet, Rfl: 0    multivitamin (THERAGRAN) tablet, Take 1 tablet by mouth once daily., Disp: , Rfl:     mupirocin (BACTROBAN) 2 % ointment, Apply topically 2 (two) times daily., Disp: 30 g, Rfl: 1    nitroGLYCERIN (NITROSTAT) 0.4 MG SL tablet, Place 1 tablet (0.4 mg total) under the tongue every 5 (five) minutes as needed for Chest pain. As needed (Patient taking differently: Place 0.4 mg under the tongue every 5 (five) minutes as needed for Chest pain. Seek medical help if pain is not relieved after the third dose.), Disp: 30 tablet, Rfl: 3    predniSONE (DELTASONE) 20 MG tablet, Take 1 tablet (20 mg total) by mouth once daily., Disp: 5 tablet, Rfl: 0    sertraline (ZOLOFT) 25 MG tablet, Take 1 tablet (25 mg total) by mouth once daily., Disp: 90 tablet, Rfl: 3    simvastatin (ZOCOR) 40 MG tablet, TAKE 1 TABLET BY MOUTH ONCE DAILY IN THE EVENING, Disp: 90 tablet, Rfl: 3    vitamin D 1000 units Tab, Take 1 tablet (1,000 Units total) by mouth once daily., Disp: 90 tablet, Rfl: 3    PMHx/PSHx Updates:  See patient's last visit with me on 8/5/2019.  See H&P on 6/29/2018        Pathology:  Cancer Staging  No matching staging information was found for the patient.          Objective:     Vitals:  Blood pressure (!) 142/52, pulse 65, temperature 98 °F (36.7 °C), temperature source Oral, resp. rate 19, weight 58.5 kg (129 lb).    Physical Examination:   GEN: no apparent distress, comfortable; AAOx3  HEAD: atraumatic and  normocephalic  EYES: no pallor, no icterus, PERRLA  ENT: OMM, no pharyngeal erythema, external ears WNL; no nasal discharge; no thrush  NECK: no masses, thyroid normal, trachea midline, no LAD/LN's, supple  CV: RRR with no murmur; normal pulse; normal S1 and S2; no pedal edema  CHEST: Normal respiratory effort; CTAB; normal breath sounds; no wheeze or crackles  ABDOM: nontender and nondistended; soft; normal bowel sounds; no rebound/guarding  MUSC/Skeletal: ROM normal; no crepitus; joints normal; no deformities or arthropathy; no longer on walker  EXTREM: no clubbing, cyanosis, inflammation or swelling  SKIN: no rashes, lesions, ulcers, petechiae or subcutaneous nodules; vitiligo   : no carter  NEURO: grossly intact; motor/sensory WNL; AAOx3; no tremors  PSYCH: normal mood, affect and behavior  LYMPH: normal cervical, supraclavicular, axillary and groin LN's             Labs:     12/14/2019    Lab Results   Component Value Date    WBC 6.76 12/14/2019    HGB 10.1 (L) 12/14/2019    HCT 32.7 (L) 12/14/2019     (H) 12/14/2019     12/14/2019            BMP  Lab Results   Component Value Date     12/14/2019    K 4.5 12/14/2019     12/14/2019    CO2 28 12/14/2019    BUN 78 (H) 12/14/2019    CREATININE 2.0 (H) 12/14/2019    CALCIUM 9.4 12/14/2019    ANIONGAP 8 12/14/2019    ESTGFRAFRICA 25.0 (A) 12/14/2019    EGFRNONAA 21.7 (A) 12/14/2019 9/17/2019  Lab Results   Component Value Date    IRON 67 09/17/2019    TIBC 311 09/17/2019    FERRITIN 165 09/17/2019     Lab Results   Component Value Date    NSQVTGYO80 >2000 (H) 06/03/2019     Lab Results   Component Value Date    FOLATE >40.0 (H) 06/03/2019           Radiology/Diagnostic Studies:    Ct Head Without Contrast    Result Date: 7/16/2018  EXAMINATION: CT HEAD WITHOUT CONTRAST CLINICAL HISTORY: Dizziness; TECHNIQUE: 5 mm noncontrast axial images were acquired through the head. COMPARISON: CT head without contrast-11/29/2014 FINDINGS: The brain  is normally formed with preserved gray-white matter junction differentiation. No evidence of acute/recent major vascular territory cerebral infarction, parenchymal hemorrhage, or intra-axial mass.  There is age-appropriate cerebral volume loss with associated compensatory enlargement of the ventricular system and widening of the CSF spaces over both cerebral convexities.  There are confluent areas of periventricular white matter hypoattenuation compatible with age-appropriate chronic microvascular ischemic changes. No hydrocephalus.  No effacement of the skull-base cisterns.  No extra-axial fluid collections or blood products. The paranasal sinuses and mastoid air cells are clear.  There is rightward bony nasal septal deviation.  The visualized orbits are unremarkable.  The bony calvarium and visualized facial bones show no acute abnormality.     No acute intracranial abnormality appreciated.  No interval detrimental change in the imaging appearance of the brain when compared to the previous study. Electronically signed by: Aubrey Davis MD Date:    07/16/2018 Time:    08:01    Radiology Report    Result Date: 7/15/2018  Ordered by an unspecified provider.      I have reviewed all available lab results and radiology reports.    Assessment/Plan:   (1) 89 y.o. female with diagnosis of a recent LLE DVT and Pulmonary emboli who has been referred by Dr Jo with Neph for evaluation by medical hematology/oncology. She was hospitalized [facility]. She has never had any clots before. No family hx/of clots.    - factor panel, protein C and S, ATIII adequate  - antiphosphatidyl negative; LA negative  - homocysteine elevated at 20.2  - MTHFR-C heterozygous positive - discussed the genetic implications with her and her daughter  - prothrombin II negative  - she is on eliquis bid      (2) COPD/asthma; former smoker     (3) Left ventricular dysfunction     (4) HTN and hypercholesterolemia     (5) CRI - stage III     (6) Anemia -  most likely a multifactorial process with iron deficiency diagnosis, anemia of chronic disorders, anemia of chronic renal  - latest hgb is low  at  10.1 but relatively stable  - she is on iron and MVI  - iron panel is adequate    (7) DM and hypothyroidism    (8) Recent hip fracture - left - needed pin placement only            History of pulmonary embolism    Deep vein thrombosis (DVT) of proximal vein of left lower extremity, unspecified chronicity    Other acute pulmonary embolism without acute cor pulmonale    Chronic anticoagulation    Anemia, unspecified type    Anemia due to multiple mechanisms    Anemia of chronic disease    Anemia, chronic disease    Anemia, chronic renal failure, stage 3 (moderate)    Iron deficiency anemia due to chronic blood loss    Macrocytic anemia    Normocytic normochromic anemia    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 Continue Folbic for the hyperhomocysteinemia  2. Continue eliquis and consider lifelong usage  3. Previously  discussed the genetic implications of the MTHFR-C in siblings and children  4. F/u with PCP, GI etc  6. Check labs monthly for now (encouraged compliance)    RTC in  3-4 months    Fax note to Eil Edmonds MD; Reva Jo    Discussion:       I spent over 25 mins of time with the patient. Reviewed results of the recently ordered labs, tests and studies; made directives with regards to the results. Over half of this time was spent couseling and coordinating care.    I have explained all of the above in detail and the patient understands all of the current recommendation(s). I have answered all of their questions to the best of my ability and to their complete satisfaction.   The patient is to continue with the current management plan.            Electronically signed by Issac Alvarez MD

## 2020-01-21 ENCOUNTER — OFFICE VISIT (OUTPATIENT)
Dept: PULMONOLOGY | Facility: CLINIC | Age: 85
End: 2020-01-21
Payer: MEDICARE

## 2020-01-21 VITALS
DIASTOLIC BLOOD PRESSURE: 60 MMHG | BODY MASS INDEX: 22.02 KG/M2 | WEIGHT: 129 LBS | OXYGEN SATURATION: 99 % | SYSTOLIC BLOOD PRESSURE: 135 MMHG | RESPIRATION RATE: 16 BRPM | HEIGHT: 64 IN | HEART RATE: 69 BPM

## 2020-01-21 DIAGNOSIS — J44.1 COPD EXACERBATION: ICD-10-CM

## 2020-01-21 DIAGNOSIS — J44.9 COPD MIXED TYPE: ICD-10-CM

## 2020-01-21 DIAGNOSIS — J44.89 ASTHMA-COPD OVERLAP SYNDROME: Primary | ICD-10-CM

## 2020-01-21 DIAGNOSIS — J82.83 EOSINOPHILIC ASTHMA: ICD-10-CM

## 2020-01-21 DIAGNOSIS — Z99.81 ON HOME OXYGEN THERAPY: ICD-10-CM

## 2020-01-21 PROCEDURE — 99213 OFFICE O/P EST LOW 20 MIN: CPT | Mod: S$GLB,,, | Performed by: INTERNAL MEDICINE

## 2020-01-21 PROCEDURE — 1126F PR PAIN SEVERITY QUANTIFIED, NO PAIN PRESENT: ICD-10-PCS | Mod: S$GLB,,, | Performed by: INTERNAL MEDICINE

## 2020-01-21 PROCEDURE — 1101F PT FALLS ASSESS-DOCD LE1/YR: CPT | Mod: CPTII,S$GLB,, | Performed by: INTERNAL MEDICINE

## 2020-01-21 PROCEDURE — 99213 PR OFFICE/OUTPT VISIT, EST, LEVL III, 20-29 MIN: ICD-10-PCS | Mod: S$GLB,,, | Performed by: INTERNAL MEDICINE

## 2020-01-21 PROCEDURE — 99999 PR PBB SHADOW E&M-EST. PATIENT-LVL III: ICD-10-PCS | Mod: PBBFAC,,, | Performed by: INTERNAL MEDICINE

## 2020-01-21 PROCEDURE — 99999 PR PBB SHADOW E&M-EST. PATIENT-LVL III: CPT | Mod: PBBFAC,,, | Performed by: INTERNAL MEDICINE

## 2020-01-21 PROCEDURE — 1159F MED LIST DOCD IN RCRD: CPT | Mod: S$GLB,,, | Performed by: INTERNAL MEDICINE

## 2020-01-21 PROCEDURE — 1101F PR PT FALLS ASSESS DOC 0-1 FALLS W/OUT INJ PAST YR: ICD-10-PCS | Mod: CPTII,S$GLB,, | Performed by: INTERNAL MEDICINE

## 2020-01-21 PROCEDURE — 1126F AMNT PAIN NOTED NONE PRSNT: CPT | Mod: S$GLB,,, | Performed by: INTERNAL MEDICINE

## 2020-01-21 PROCEDURE — 1159F PR MEDICATION LIST DOCUMENTED IN MEDICAL RECORD: ICD-10-PCS | Mod: S$GLB,,, | Performed by: INTERNAL MEDICINE

## 2020-01-21 RX ORDER — IPRATROPIUM BROMIDE AND ALBUTEROL SULFATE 2.5; .5 MG/3ML; MG/3ML
3 SOLUTION RESPIRATORY (INHALATION) EVERY 6 HOURS PRN
Qty: 120 VIAL | Refills: 11 | Status: SHIPPED | OUTPATIENT
Start: 2020-01-21 | End: 2021-10-19

## 2020-01-21 RX ORDER — PREDNISONE 20 MG/1
TABLET ORAL
Qty: 12 TABLET | Refills: 0 | Status: SHIPPED | OUTPATIENT
Start: 2020-01-21 | End: 2020-04-03 | Stop reason: SDUPTHER

## 2020-01-21 NOTE — PATIENT INSTRUCTIONS
Use trelegy or albuterol/ipratroprium 3 times daily    Use prednisone one daily for 3 days if bad cough/wheezes.

## 2020-01-21 NOTE — PROGRESS NOTES
"1/21/2020    Blaire Cage       Chief Complaint   Patient presents with    Follow-up       HPI:   1/21/2020-trelegy used but expensive, used prednisone once for flu- cleared nicely with 3 days.  No falls - uses roller walker.  Lives alone with visits from daughters daily. Sleeps with oxygen -       July 18,2019-fell with hip fx - had rehab then fell again 2 wks ago.  Uses prednisone once or twice?  No advair as not covered.  Cannot recall singulair  Patient Instructions   Use budesonide in nebulizer if covered. Use twice daily (once better than none- will prevent but not improve).  May need prednisone to keep lungs stable, should be better with regular use budesonide.     1/8/2019- no prednisone recently, has cough with mucous yellow to clear now- lungs worsened 2 wks ago and saw NP Anatoly and got celestone shot.    Discussed with patient above for education the following:    Get fluticasone - salmeterol inhaler and use 1 twice daily - if covered.  May prevent lungs from getting sick.  Use prednisone daily for 3 days if bronchitis flares.  If yellow mucous may need antibiotic.   July 2, 2018- had pe /dvt dx early June by v/q and u/s.  Took prednisone just once.    Discussed with patient above for education the following:    Use prednisone if needed. Lungs are doing well,   You had had foot swelling but no symptoms for 2 weeks- blood clot in leg went to lung.    Stay on blood thinners, usually 6 months minimum course, may be wise to stay on therapy?    Dec 20, 2017Smoked late life, no h/o asthma.  Lives alone with daughters /grandkids in and out.   No 02- off smokes a yr.  Has sinus problems chr with good flonase response.  Mucous is clear with no c/o excess cough and no wheezes.  Breathing felt to be good usually.  Uses resp rx bid.  No hosp for lungs- h/o  Fainting.  Has had very positive result from prednisone.  The chief compliant  problem is new to me",   PFS:  Past Medical History:   Diagnosis Date "    Anemia due to multiple mechanisms 2018    Anemia, chronic disease 2018    Anemia, chronic renal failure, stage 3 (moderate) 2018    Anticoagulant long-term use     aspirin    Aortic aneurysm     CHF (congestive heart failure)     COPD (chronic obstructive pulmonary disease)     COPD with acute exacerbation 2015    Diabetes mellitus type II     DVT (deep venous thrombosis) 2018    Encounter for blood transfusion     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 2018    Hip arthritis 3/1/2016    Homocysteinemia 2018    Hyperlipidemia     Hypertension     Normocytic normochromic anemia 2018    PE (pulmonary thromboembolism) 2018    Pneumonia of right lower lobe due to infectious organism 2017    Thyroid disease     hypothyroid         Past Surgical History:   Procedure Laterality Date    APPENDECTOMY      CHOLECYSTECTOMY      FRACTURE SURGERY      right hip     HERNIA REPAIR      groin    HYSTERECTOMY      INSERTION OF IMPLANTABLE LOOP RECORDER N/A 2018    Procedure: Insertion, Implantable Loop Recorder;  Surgeon: Ashok Herbert MD;  Location: [facility] CATH LAB;  Service: Cardiology;  Laterality: N/A;    PERCUTANEOUS PINNING OF HIP Left 3/23/2019    Procedure: PINNING, HIP, PERCUTANEOUS;  Surgeon: Jorje Hamlin MD;  Location: [facility] OR;  Service: Orthopedics;  Laterality: Left;    VARICOSE VEIN SURGERY       Social History     Tobacco Use    Smoking status: Former Smoker     Packs/day: 0.35     Years: 44.00     Pack years: 15.40     Types: Cigarettes     Last attempt to quit: 2015     Years since quittin.7    Smokeless tobacco: Never Used    Tobacco comment: 2015   Substance Use Topics    Alcohol use: No     Alcohol/week: 0.0 standard drinks     Frequency: Never     Binge frequency: Never    Drug use: No     Family History   Problem Relation Age of Onset    Hypertension Mother     Heart disease Mother     Lung disease  "Father     Diabetes Sister     Diabetes Brother     Kidney disease Son      Review of patient's allergies indicates:   Allergen Reactions    Carvedilol Other (See Comments)     Bradycardia and syncope    Boniva [ibandronate]     Codeine     Hydralazine analogues     Iodinated contrast- oral and iv dye Hives    Kionex [sodium polystyrene sulfonate]     Lisinopril     Morphine        Performance Status:The patient's activity level is functions out of house.      Review of Systems:  a review of eleven systems covering constitutional, Eye, HEENT, Psych, Respiratory, Cardiac, GI, , Musculoskeletal, Endocrine, Dermatologic was negative except for pertinent findings as listed ABOVE and below: all good save above, was on dm rx, on bone rx      Exam:Comprehensive exam done. /60   Pulse 69   Resp 16   Ht 5' 4" (1.626 m)   Wt 58.5 kg (128 lb 15.5 oz)   LMP  (LMP Unknown)   SpO2 99% Comment: room air  BMI 22.14 kg/m²   Exam included Vitals as listed, and patient's appearance and affect and alertness and mood, oral exam for yeast and hygiene and pharynx lesions and Mallapatti (M) score, neck with inspection for jvd and masses and thyroid abnormalities and lymph nodes (supraclavicular and infraclavicular nodes and axillary also examined and noted if abn), chest exam included symmetry and effort and fremitus and percussion and auscultation, cardiac exam included rhythm and gallops and murmur and rubs and jvd and edema, abdominal exam for mass and hepatosplenomegaly and tenderness and hernias and bowel sounds, Musculoskeletal exam with muscle tone and posture and mobility/gait and  strength, and skin for rashes and cyanosis and pallor and turgor, extremity for clubbing.  Findings were normal except for pertinent findings listed below:  M1, slender, nl pharynx, no neck abn, symmetric chest, nl fremitus/percussion, good bs, RRR with no  Murmur or gallop or rub, no jvd nor edema, noclubbing, alert and " moves all 4, euthymic    Radiographs (ct chest and cxr) reviewed: results reviewed  -   10/22/19 cxr nad    ct chest viewed and nad. .lung scan June indeterminate.  Pos leg study early June 2018-  Impression       This represents an intermediate probability V/Q scan for the presence of pulmonary embolism.  Consider additional evaluation with lower extremity venous ultrasound or CTA chest.      Electronically signed by: Aubrey Davis MD  Date: 06/09/2018  Time: 09:08     Impression       1. Interval development of enlargement of the left popliteal vein and nonocclusive peripheral thrombus within the left popliteal vein focally.  2. Complete thrombosis of a superficial venous varicosity in the left popliteal fossa.  3. Venous reflux within the left popliteal vein is a result of an incompetent venous valve.  This report was flagged in Epic as abnormal.      Electronically signed by: Aubrey Davis MD  Date: 06/09/2018  Time: 15:47       Labs none available      eos have been up to 400!  PFT results reviewed  10/30/2017- fev 52% 0.82 with 12% bd response.    Plan:  Clinical impression is apparently straight forward and impression with management as below.    There are no diagnoses linked to this encounter.    No follow-ups on file.    Discussed with patient above for education the following:          Discussed with patient above for education the following:      There are no Patient Instructions on file for this visit.

## 2020-02-05 ENCOUNTER — LAB VISIT (OUTPATIENT)
Dept: LAB | Facility: HOSPITAL | Age: 85
End: 2020-02-05
Attending: INTERNAL MEDICINE
Payer: MEDICARE

## 2020-02-05 ENCOUNTER — TELEPHONE (OUTPATIENT)
Dept: FAMILY MEDICINE | Facility: CLINIC | Age: 85
End: 2020-02-05

## 2020-02-05 ENCOUNTER — CLINICAL SUPPORT (OUTPATIENT)
Dept: FAMILY MEDICINE | Facility: CLINIC | Age: 85
End: 2020-02-05
Payer: MEDICARE

## 2020-02-05 VITALS — DIASTOLIC BLOOD PRESSURE: 54 MMHG | HEART RATE: 68 BPM | SYSTOLIC BLOOD PRESSURE: 138 MMHG

## 2020-02-05 DIAGNOSIS — D63.8 ANEMIA, CHRONIC DISEASE: ICD-10-CM

## 2020-02-05 DIAGNOSIS — D50.0 IRON DEFICIENCY ANEMIA DUE TO CHRONIC BLOOD LOSS: ICD-10-CM

## 2020-02-05 DIAGNOSIS — D63.1 ANEMIA, CHRONIC RENAL FAILURE, STAGE 3 (MODERATE): ICD-10-CM

## 2020-02-05 DIAGNOSIS — N18.30 ANEMIA, CHRONIC RENAL FAILURE, STAGE 3 (MODERATE): ICD-10-CM

## 2020-02-05 DIAGNOSIS — D63.8 ANEMIA OF CHRONIC DISEASE: ICD-10-CM

## 2020-02-05 DIAGNOSIS — Z01.30 BP CHECK: Primary | ICD-10-CM

## 2020-02-05 DIAGNOSIS — D64.9 NORMOCYTIC NORMOCHROMIC ANEMIA: ICD-10-CM

## 2020-02-05 DIAGNOSIS — D64.9 ANEMIA, UNSPECIFIED TYPE: ICD-10-CM

## 2020-02-05 DIAGNOSIS — D64.89 ANEMIA DUE TO MULTIPLE MECHANISMS: ICD-10-CM

## 2020-02-05 LAB
BASOPHILS # BLD AUTO: 0.05 K/UL (ref 0–0.2)
BASOPHILS NFR BLD: 0.8 % (ref 0–1.9)
DIFFERENTIAL METHOD: ABNORMAL
EOSINOPHIL # BLD AUTO: 0.3 K/UL (ref 0–0.5)
EOSINOPHIL NFR BLD: 4 % (ref 0–8)
ERYTHROCYTE [DISTWIDTH] IN BLOOD BY AUTOMATED COUNT: 14.1 % (ref 11.5–14.5)
HCT VFR BLD AUTO: 30.7 % (ref 37–48.5)
HGB BLD-MCNC: 9.1 G/DL (ref 12–16)
IMM GRANULOCYTES # BLD AUTO: 0.02 K/UL (ref 0–0.04)
IMM GRANULOCYTES NFR BLD AUTO: 0.3 % (ref 0–0.5)
LYMPHOCYTES # BLD AUTO: 1.9 K/UL (ref 1–4.8)
LYMPHOCYTES NFR BLD: 30.9 % (ref 18–48)
MCH RBC QN AUTO: 30.7 PG (ref 27–31)
MCHC RBC AUTO-ENTMCNC: 29.6 G/DL (ref 32–36)
MCV RBC AUTO: 104 FL (ref 82–98)
MONOCYTES # BLD AUTO: 0.8 K/UL (ref 0.3–1)
MONOCYTES NFR BLD: 13.3 % (ref 4–15)
NEUTROPHILS # BLD AUTO: 3.1 K/UL (ref 1.8–7.7)
NEUTROPHILS NFR BLD: 50.7 % (ref 38–73)
NRBC BLD-RTO: 0 /100 WBC
PLATELET # BLD AUTO: 207 K/UL (ref 150–350)
PMV BLD AUTO: 11.7 FL (ref 9.2–12.9)
RBC # BLD AUTO: 2.96 M/UL (ref 4–5.4)
WBC # BLD AUTO: 6.18 K/UL (ref 3.9–12.7)

## 2020-02-05 PROCEDURE — 83540 ASSAY OF IRON: CPT

## 2020-02-05 PROCEDURE — 99999 PR PBB SHADOW E&M-EST. PATIENT-LVL II: CPT | Mod: PBBFAC,,,

## 2020-02-05 PROCEDURE — 82728 ASSAY OF FERRITIN: CPT

## 2020-02-05 PROCEDURE — 36415 COLL VENOUS BLD VENIPUNCTURE: CPT | Mod: PO

## 2020-02-05 PROCEDURE — 85025 COMPLETE CBC W/AUTO DIFF WBC: CPT

## 2020-02-05 PROCEDURE — 99999 PR PBB SHADOW E&M-EST. PATIENT-LVL II: ICD-10-PCS | Mod: PBBFAC,,,

## 2020-02-05 PROCEDURE — 80053 COMPREHEN METABOLIC PANEL: CPT

## 2020-02-05 NOTE — TELEPHONE ENCOUNTER
1. Blaire Cage 89 y.o. female is here today for Blood Pressure check.   2. History of HTN yes.  3.          1. Review of patient's allergies indicates:   1. Allergen 1. Reactions   1.  1. Carvedilol 1. Other (See Comments)   1.   1.   1. Bradycardia and syncope   1.  1. Boniva [ibandronate] 1.     1.  1. Codeine 1.     1.  1. Hydralazine analogues 1.     1.  1. Iodinated contrast media 1. Hives   1.  1. Kionex [sodium polystyrene sulfonate] 1. Diarrhea   1.   1.   1. From encounter 12/11/17 for diarrhea--not true allergy.   1.  1. Lisinopril 1.     1.  1. Morphine 1.     1.          1. Creatinine   1. Date 1. Value 1. Ref Range 1. Status   1. 12/14/2019 1. 2.0 (H) 1. 0.5 - 1.4 mg/dL 1. Final   1. 03/04/2013 1. 1.1 1. 0.5 - 1.4 mg/dL 1. Final   1.          1. Sodium   1. Date 1. Value 1. Ref Range 1. Status   1. 12/14/2019 1. 142 1. 136 - 145 mmol/L 1. Final   1.          1. Potassium   1. Date 1. Value 1. Ref Range 1. Status   1. 12/14/2019 1. 4.5 1. 3.5 - 5.1 mmol/L 1. Final   1. ]  2. Patient verifies taking blood pressure medications on a regular basis at the same time of the day.   3.  Current Outpatient Medications:     acetaminophen (TYLENOL) 325 MG tablet, Take 2 tablets (650 mg total) by mouth every 4 (four) hours as needed. (Patient taking differently: Take 650 mg by mouth every 4 (four) hours as needed for Pain. ), Disp: , Rfl: 0    albuterol-ipratropium (DUO-NEB) 2.5 mg-0.5 mg/3 mL nebulizer solution, Take 3 mLs by nebulization every 6 (six) hours as needed for Wheezing. Rescue, Disp: 120 vial, Rfl: 11    amLODIPine (NORVASC) 5 MG tablet, TAKE 1 TABLET BY MOUTH ONCE DAILY, Disp: 90 tablet, Rfl: 2    apixaban (ELIQUIS) 5 mg Tab, Take 1 tablet (5 mg total) by mouth 2 (two) times daily., Disp: 180 tablet, Rfl: 3    ascorbic acid (VITAMIN C) 500 MG tablet, Take 500 mg by mouth once daily.  , Disp: , Rfl:     aspirin (ECOTRIN) 81 MG EC tablet, Take 81 mg by mouth once daily.  , Disp: , Rfl:      calcium carbonate (OS-TASIA) 500 mg calcium (1,250 mg) tablet, Take 1 tablet by mouth 2 (two) times daily.  , Disp: , Rfl:     cyanocobalamin/folic acid (FOLTRATE ORAL), vitamin B12-folic acid  2.5 - 25-2MG, Disp: , Rfl:     docusate sodium (COLACE) 100 MG capsule, Take 1 capsule (100 mg total) by mouth 2 (two) times daily., Disp: 90 capsule, Rfl: 0    ferrous sulfate (FEOSOL) 325 mg (65 mg iron) Tab tablet, Take 1 tablet (325 mg total) by mouth 2 (two) times daily with meals., Disp: 180 tablet, Rfl: 3    fish oil-omega-3 fatty acids 300-1,000 mg capsule, Take 2 g by mouth every evening. , Disp: , Rfl:     fluocinolone (SYNALAR) 0.01 % external solution, Apply topically 2 (two) times daily., Disp: 90 mL, Rfl: 1    fluticasone (FLONASE) 50 mcg/actuation nasal spray, sprays by Each Nare route once daily., Disp: 48 g, Rfl:   fluticasone-umeclidin-vilanter (TRELEGY ELLIPTA) 100-62.5-25 mcg DsDv, Inhale 1 puff into the lungs once daily., Disp: 60 each, Rfl: 11    folic acid-vit B6-vit B12 2.5-25-2 mg (FOLBIC) 2.5-25-2 mg Tab, Take 1 tablet by mouth once daily., Disp: 90 tablet, Rfl: 3    furosemide (LASIX) 20 MG tablet, Take 1 tablet on Tuesdays and Thursdays., Disp: 24 tablet, Rfl: 3    gabapentin (NEURONTIN) 300 MG capsule, Take 1 capsule (300 mg total) by mouth every evening., Disp: 90 capsule, Rfl: 3    glipiZIDE (GLUCOTROL) 2.5 MG TR24, Take 1 tablet (2.5 mg total) by mouth daily with breakfast., Disp: 90 tablet, Rfl: 3    levothyroxine (SYNTHROID) 88 MCG tablet, Take 88 mcg by mouth once daily., Disp: , Rfl:     lidocaine (LIDODERM) 5 %, Place 1 patch onto the skin once daily. Remove & Discard patch within 12 hours or as directed by MD, Disp: 3 patch, Rfl: 0    magnesium oxide (MAG-OX) 400 mg (241.3 mg magnesium) tablet, TAKE 1 TABLET BY MOUTH ONCE DAILY, Disp: 90 tablet, Rfl: 3    montelukast (SINGULAIR) 10 mg tablet, Take 1 tablet (10 mg total) by mouth once daily., Disp: 90 tablet, Rfl: 3     moxifloxacin (AVELOX) 400 mg tablet, Take 1 tablet (400 mg total) by mouth once daily., Disp: 5 tablet, Rfl: 0    multivitamin (THERAGRAN) tablet, Take 1 tablet by mouth once daily., Disp: , Rfl:     mupirocin (BACTROBAN) 2 % ointment, Apply topically 2 (two) times daily., Disp: 30 g, Rfl: 1    nitroGLYCERIN (NITROSTAT) 0.4 MG SL tablet, Place 1 tablet (0.4 mg total) under the tongue every 5 (five) minutes as needed for Chest pain. As needed (Patient taking differently: Place 0.4 mg under the tongue every 5 (five) minutes as needed for Chest pain. Seek medical help if pain is not relieved after the third dose.), Disp: 30 tablet, Rfl: 3    predniSONE (DELTASONE) 20 MG tablet, Take one daily for 3 days and repeat if needed., Disp: 12 tablet, Rfl: 0    sertraline (ZOLOFT) 25 MG tablet, Take 1 tablet (25 mg total) by mouth once daily., Disp: 90 tablet, Rfl: 3    simvastatin (ZOCOR) 40 MG tablet, TAKE 1 TABLET BY MOUTH ONCE DAILY IN THE EVENING, Disp: 90 tablet, Rfl: 3    vitamin D 1000 units Tab, Take 1 tablet (1,000 Units total) by mouth once daily., Disp: 90 tablet, Rfl: 3    Does patient have record of home blood pressure readings no. Just gave patient BP Log to keep track of BP.   Last dose of blood pressure medication was taken at 8am 2/5/2020  Patient is asymptomatic.   Complains of burry vision.   BP: (!) 150/74 , Pulse: 68 .   Blood pressure reading after 15 minutes was 138/54 , Pulse 70.

## 2020-02-05 NOTE — PROGRESS NOTES
Blaire Cage 89 y.o. female is here today for Blood Pressure check.   History of HTN yes.    Review of patient's allergies indicates:   Allergen Reactions    Carvedilol Other (See Comments)     Bradycardia and syncope    Boniva [ibandronate]     Codeine     Hydralazine analogues     Iodinated contrast media Hives    Kionex [sodium polystyrene sulfonate] Diarrhea     From encounter 12/11/17 for diarrhea--not true allergy.    Lisinopril     Morphine      Creatinine   Date Value Ref Range Status   12/14/2019 2.0 (H) 0.5 - 1.4 mg/dL Final   03/04/2013 1.1 0.5 - 1.4 mg/dL Final     Sodium   Date Value Ref Range Status   12/14/2019 142 136 - 145 mmol/L Final     Potassium   Date Value Ref Range Status   12/14/2019 4.5 3.5 - 5.1 mmol/L Final   ]  Patient verifies taking blood pressure medications on a regular basis at the same time of the day.     Current Outpatient Medications:     acetaminophen (TYLENOL) 325 MG tablet, Take 2 tablets (650 mg total) by mouth every 4 (four) hours as needed. (Patient taking differently: Take 650 mg by mouth every 4 (four) hours as needed for Pain. ), Disp: , Rfl: 0    albuterol-ipratropium (DUO-NEB) 2.5 mg-0.5 mg/3 mL nebulizer solution, Take 3 mLs by nebulization every 6 (six) hours as needed for Wheezing. Rescue, Disp: 120 vial, Rfl: 11    amLODIPine (NORVASC) 5 MG tablet, TAKE 1 TABLET BY MOUTH ONCE DAILY, Disp: 90 tablet, Rfl: 2    apixaban (ELIQUIS) 5 mg Tab, Take 1 tablet (5 mg total) by mouth 2 (two) times daily., Disp: 180 tablet, Rfl: 3    ascorbic acid (VITAMIN C) 500 MG tablet, Take 500 mg by mouth once daily.  , Disp: , Rfl:     aspirin (ECOTRIN) 81 MG EC tablet, Take 81 mg by mouth once daily.  , Disp: , Rfl:     calcium carbonate (OS-TASIA) 500 mg calcium (1,250 mg) tablet, Take 1 tablet by mouth 2 (two) times daily.  , Disp: , Rfl:     cyanocobalamin/folic acid (FOLTRATE ORAL), vitamin B12-folic acid  2.5 - 25-2MG, Disp: , Rfl:     docusate sodium (COLACE)  100 MG capsule, Take 1 capsule (100 mg total) by mouth 2 (two) times daily., Disp: 90 capsule, Rfl: 0    ferrous sulfate (FEOSOL) 325 mg (65 mg iron) Tab tablet, Take 1 tablet (325 mg total) by mouth 2 (two) times daily with meals., Disp: 180 tablet, Rfl: 3    fish oil-omega-3 fatty acids 300-1,000 mg capsule, Take 2 g by mouth every evening. , Disp: , Rfl:     fluocinolone (SYNALAR) 0.01 % external solution, Apply topically 2 (two) times daily., Disp: 90 mL, Rfl: 1    fluticasone (FLONASE) 50 mcg/actuation nasal spray, 2 sprays by Each Nare route once daily., Disp: 48 g, Rfl: 3    fluticasone-umeclidin-vilanter (TRELEGY ELLIPTA) 100-62.5-25 mcg DsDv, Inhale 1 puff into the lungs once daily., Disp: 60 each, Rfl: 11    folic acid-vit B6-vit B12 2.5-25-2 mg (FOLBIC) 2.5-25-2 mg Tab, Take 1 tablet by mouth once daily., Disp: 90 tablet, Rfl: 3    furosemide (LASIX) 20 MG tablet, Take 1 tablet on Tuesdays and Thursdays., Disp: 24 tablet, Rfl: 3    gabapentin (NEURONTIN) 300 MG capsule, Take 1 capsule (300 mg total) by mouth every evening., Disp: 90 capsule, Rfl: 3    glipiZIDE (GLUCOTROL) 2.5 MG TR24, Take 1 tablet (2.5 mg total) by mouth daily with breakfast., Disp: 90 tablet, Rfl: 3    levothyroxine (SYNTHROID) 88 MCG tablet, Take 88 mcg by mouth once daily., Disp: , Rfl:     lidocaine (LIDODERM) 5 %, Place 1 patch onto the skin once daily. Remove & Discard patch within 12 hours or as directed by MD, Disp: 3 patch, Rfl: 0    magnesium oxide (MAG-OX) 400 mg (241.3 mg magnesium) tablet, TAKE 1 TABLET BY MOUTH ONCE DAILY, Disp: 90 tablet, Rfl: 3    montelukast (SINGULAIR) 10 mg tablet, Take 1 tablet (10 mg total) by mouth once daily., Disp: 90 tablet, Rfl: 3    moxifloxacin (AVELOX) 400 mg tablet, Take 1 tablet (400 mg total) by mouth once daily., Disp: 5 tablet, Rfl: 0    multivitamin (THERAGRAN) tablet, Take 1 tablet by mouth once daily., Disp: , Rfl:     mupirocin (BACTROBAN) 2 % ointment, Apply  topically 2 (two) times daily., Disp: 30 g, Rfl: 1    nitroGLYCERIN (NITROSTAT) 0.4 MG SL tablet, Place 1 tablet (0.4 mg total) under the tongue every 5 (five) minutes as needed for Chest pain. As needed (Patient taking differently: Place 0.4 mg under the tongue every 5 (five) minutes as needed for Chest pain. Seek medical help if pain is not relieved after the third dose.), Disp: 30 tablet, Rfl: 3    predniSONE (DELTASONE) 20 MG tablet, Take one daily for 3 days and repeat if needed., Disp: 12 tablet, Rfl: 0    sertraline (ZOLOFT) 25 MG tablet, Take 1 tablet (25 mg total) by mouth once daily., Disp: 90 tablet, Rfl: 3    simvastatin (ZOCOR) 40 MG tablet, TAKE 1 TABLET BY MOUTH ONCE DAILY IN THE EVENING, Disp: 90 tablet, Rfl: 3    vitamin D 1000 units Tab, Take 1 tablet (1,000 Units total) by mouth once daily., Disp: 90 tablet, Rfl: 3  Does patient have record of home blood pressure readings no. Just gave patient BP Log to keep track of BP.   Last dose of blood pressure medication was taken at 8am 2/5/2020  Patient is asymptomatic.   Complains of burry vision.    BP: (!) 150/74 , Pulse: 68 .    Blood pressure reading after 15 minutes was 138/54 , Pulse 70.

## 2020-02-06 ENCOUNTER — DOCUMENTATION ONLY (OUTPATIENT)
Dept: HEMATOLOGY/ONCOLOGY | Facility: CLINIC | Age: 85
End: 2020-02-06

## 2020-02-06 ENCOUNTER — TELEPHONE (OUTPATIENT)
Dept: HEMATOLOGY/ONCOLOGY | Facility: CLINIC | Age: 85
End: 2020-02-06

## 2020-02-06 DIAGNOSIS — E11.40 TYPE 2 DIABETES MELLITUS WITH DIABETIC NEUROPATHY, WITHOUT LONG-TERM CURRENT USE OF INSULIN: ICD-10-CM

## 2020-02-06 LAB
ALBUMIN SERPL BCP-MCNC: 3.4 G/DL (ref 3.5–5.2)
ALP SERPL-CCNC: 65 U/L (ref 55–135)
ALT SERPL W/O P-5'-P-CCNC: 14 U/L (ref 10–44)
ANION GAP SERPL CALC-SCNC: 11 MMOL/L (ref 8–16)
AST SERPL-CCNC: 26 U/L (ref 10–40)
BILIRUB SERPL-MCNC: 0.2 MG/DL (ref 0.1–1)
BUN SERPL-MCNC: 61 MG/DL (ref 8–23)
CALCIUM SERPL-MCNC: 9.2 MG/DL (ref 8.7–10.5)
CHLORIDE SERPL-SCNC: 106 MMOL/L (ref 95–110)
CO2 SERPL-SCNC: 22 MMOL/L (ref 23–29)
CREAT SERPL-MCNC: 1.6 MG/DL (ref 0.5–1.4)
EST. GFR  (AFRICAN AMERICAN): 32.7 ML/MIN/1.73 M^2
EST. GFR  (NON AFRICAN AMERICAN): 28.4 ML/MIN/1.73 M^2
FERRITIN SERPL-MCNC: 179 NG/ML (ref 20–300)
GLUCOSE SERPL-MCNC: 42 MG/DL (ref 70–110)
IRON SERPL-MCNC: 45 UG/DL (ref 30–160)
POTASSIUM SERPL-SCNC: 5.1 MMOL/L (ref 3.5–5.1)
PROT SERPL-MCNC: 7.4 G/DL (ref 6–8.4)
SATURATED IRON: 14 % (ref 20–50)
SODIUM SERPL-SCNC: 139 MMOL/L (ref 136–145)
TOTAL IRON BINDING CAPACITY: 329 UG/DL (ref 250–450)
TRANSFERRIN SERPL-MCNC: 222 MG/DL (ref 200–375)

## 2020-02-06 NOTE — TELEPHONE ENCOUNTER
----- Message from Issac Alvarez MD sent at 2/6/2020 10:39 AM CST -----  Glucose was low - did she recheck it ?

## 2020-02-06 NOTE — TELEPHONE ENCOUNTER
----- Message from Lynn Davis sent at 2/6/2020 11:56 AM CST -----  Contact: patient  Type:  RX Refill Request    Who Called:  patient  Refill or New Rx:  refill  RX Name and Strength:  glipiZIDE (GLUCOTROL) 2.5 MG TR24  How is the patient currently taking it? (ex. 1XDay):    Is this a 30 day or 90 day RX:    Preferred Pharmacy with phone number:  [facility]  Local or Mail Order:  local  Ordering Provider:    [facility] Call Back Number:  [phone]  Additional Information:  Requesting a call back,when script has been sent

## 2020-02-06 NOTE — PROGRESS NOTES
Was called by answering service around 3:30am with a low glucose of 42(drawn at 14:45 yesterday). Called patient to check on her.  She was feeling well. No symptoms.  Will check her sugar by fingerstick and call us if it is below 60.

## 2020-02-07 NOTE — TELEPHONE ENCOUNTER
----- Message from Issac Alvarez MD sent at 2/6/2020 10:39 AM CST -----  Glucose was low - did she recheck it ?      Left message at grand daughter's number. All info left and I asked her to return the call if she had any questions

## 2020-02-10 RX ORDER — GLIPIZIDE 2.5 MG/1
2.5 TABLET, EXTENDED RELEASE ORAL
Qty: 90 TABLET | Refills: 3 | Status: SHIPPED | OUTPATIENT
Start: 2020-02-10 | End: 2020-02-14

## 2020-02-10 RX ORDER — MONTELUKAST SODIUM 10 MG/1
TABLET ORAL
Qty: 90 TABLET | Refills: 3 | Status: SHIPPED | OUTPATIENT
Start: 2020-02-10 | End: 2020-04-14

## 2020-02-12 ENCOUNTER — PATIENT OUTREACH (OUTPATIENT)
Dept: ADMINISTRATIVE | Facility: OTHER | Age: 85
End: 2020-02-12

## 2020-02-12 ENCOUNTER — TELEPHONE (OUTPATIENT)
Dept: FAMILY MEDICINE | Facility: CLINIC | Age: 85
End: 2020-02-12

## 2020-02-12 DIAGNOSIS — N18.30 TYPE 2 DIABETES MELLITUS WITH STAGE 3 CHRONIC KIDNEY DISEASE, WITHOUT LONG-TERM CURRENT USE OF INSULIN: Primary | ICD-10-CM

## 2020-02-12 DIAGNOSIS — E11.22 TYPE 2 DIABETES MELLITUS WITH STAGE 3 CHRONIC KIDNEY DISEASE, WITHOUT LONG-TERM CURRENT USE OF INSULIN: Primary | ICD-10-CM

## 2020-02-12 NOTE — TELEPHONE ENCOUNTER
----- Message from Niko Dumont sent at 2/12/2020  3:26 PM CST -----  Contact: Patient  Type:  Sooner Apoointment Request    Caller is requesting a sooner appointment.  Caller declined first available appointment listed below.  Caller will not accept being placed on the waitlist and is requesting a message be sent to doctor.    Name of Caller:  Patient  When is the first available appointment?  3/17  Symptoms:  Headaches, frequent urination  Best Call Back Number:  [phone]  Additional Information:  NA

## 2020-02-14 ENCOUNTER — OFFICE VISIT (OUTPATIENT)
Dept: CARDIOLOGY | Facility: CLINIC | Age: 85
End: 2020-02-14
Payer: MEDICARE

## 2020-02-14 VITALS
HEIGHT: 64 IN | SYSTOLIC BLOOD PRESSURE: 140 MMHG | DIASTOLIC BLOOD PRESSURE: 64 MMHG | OXYGEN SATURATION: 94 % | HEART RATE: 66 BPM | WEIGHT: 124.56 LBS | BODY MASS INDEX: 21.27 KG/M2

## 2020-02-14 DIAGNOSIS — E16.2 HYPOGLYCEMIA: ICD-10-CM

## 2020-02-14 DIAGNOSIS — E11.69 HYPERLIPIDEMIA ASSOCIATED WITH TYPE 2 DIABETES MELLITUS: ICD-10-CM

## 2020-02-14 DIAGNOSIS — I11.9 HYPERTENSIVE LEFT VENTRICULAR HYPERTROPHY, WITHOUT HEART FAILURE: ICD-10-CM

## 2020-02-14 DIAGNOSIS — D50.0 IRON DEFICIENCY ANEMIA DUE TO CHRONIC BLOOD LOSS: ICD-10-CM

## 2020-02-14 DIAGNOSIS — F17.200 SMOKER: ICD-10-CM

## 2020-02-14 DIAGNOSIS — R79.89 PRERENAL AZOTEMIA: ICD-10-CM

## 2020-02-14 DIAGNOSIS — E78.5 HYPERLIPIDEMIA ASSOCIATED WITH TYPE 2 DIABETES MELLITUS: ICD-10-CM

## 2020-02-14 DIAGNOSIS — N18.4 TYPE 2 DIABETES MELLITUS WITH STAGE 4 CHRONIC KIDNEY DISEASE, WITHOUT LONG-TERM CURRENT USE OF INSULIN: ICD-10-CM

## 2020-02-14 DIAGNOSIS — E11.22 TYPE 2 DIABETES MELLITUS WITH STAGE 4 CHRONIC KIDNEY DISEASE, WITHOUT LONG-TERM CURRENT USE OF INSULIN: ICD-10-CM

## 2020-02-14 DIAGNOSIS — D64.9 NORMOCYTIC NORMOCHROMIC ANEMIA: ICD-10-CM

## 2020-02-14 DIAGNOSIS — E88.09 HYPOALBUMINEMIA: ICD-10-CM

## 2020-02-14 DIAGNOSIS — I51.89 LEFT VENTRICULAR DIASTOLIC DYSFUNCTION WITH PRESERVED SYSTOLIC FUNCTION: Primary | ICD-10-CM

## 2020-02-14 PROBLEM — R06.02 SHORTNESS OF BREATH: Status: RESOLVED | Noted: 2018-06-09 | Resolved: 2020-02-14

## 2020-02-14 PROBLEM — R06.02 SOB (SHORTNESS OF BREATH): Status: RESOLVED | Noted: 2018-06-09 | Resolved: 2020-02-14

## 2020-02-14 PROCEDURE — 1126F AMNT PAIN NOTED NONE PRSNT: CPT | Mod: S$GLB,,, | Performed by: INTERNAL MEDICINE

## 2020-02-14 PROCEDURE — 1101F PT FALLS ASSESS-DOCD LE1/YR: CPT | Mod: CPTII,S$GLB,, | Performed by: INTERNAL MEDICINE

## 2020-02-14 PROCEDURE — 1159F MED LIST DOCD IN RCRD: CPT | Mod: S$GLB,,, | Performed by: INTERNAL MEDICINE

## 2020-02-14 PROCEDURE — 99215 PR OFFICE/OUTPT VISIT, EST, LEVL V, 40-54 MIN: ICD-10-PCS | Mod: S$GLB,,, | Performed by: INTERNAL MEDICINE

## 2020-02-14 PROCEDURE — 99215 OFFICE O/P EST HI 40 MIN: CPT | Mod: S$GLB,,, | Performed by: INTERNAL MEDICINE

## 2020-02-14 PROCEDURE — 99999 PR PBB SHADOW E&M-EST. PATIENT-LVL III: CPT | Mod: PBBFAC,,, | Performed by: INTERNAL MEDICINE

## 2020-02-14 PROCEDURE — 1159F PR MEDICATION LIST DOCUMENTED IN MEDICAL RECORD: ICD-10-PCS | Mod: S$GLB,,, | Performed by: INTERNAL MEDICINE

## 2020-02-14 PROCEDURE — 99999 PR PBB SHADOW E&M-EST. PATIENT-LVL III: ICD-10-PCS | Mod: PBBFAC,,, | Performed by: INTERNAL MEDICINE

## 2020-02-14 PROCEDURE — 1126F PR PAIN SEVERITY QUANTIFIED, NO PAIN PRESENT: ICD-10-PCS | Mod: S$GLB,,, | Performed by: INTERNAL MEDICINE

## 2020-02-14 PROCEDURE — 1101F PR PT FALLS ASSESS DOC 0-1 FALLS W/OUT INJ PAST YR: ICD-10-PCS | Mod: CPTII,S$GLB,, | Performed by: INTERNAL MEDICINE

## 2020-02-14 RX ORDER — FUROSEMIDE 20 MG/1
TABLET ORAL
Qty: 24 TABLET | Refills: 3 | Status: SHIPPED | OUTPATIENT
Start: 2020-02-14 | End: 2021-03-26 | Stop reason: SDUPTHER

## 2020-02-14 NOTE — PROGRESS NOTES
" Patient ID:  Blaire Cage is a 89 y.o. female who presents for {Misc; evaluation/follow-up:24674::"follow-up"} of 6 month f/u      Health literacy: {DESC; LOW/MEDIUM/HIGH:31247}medium  Activities:  Not much  Nicotine: former, quit 2015  Alcohol: none  Illicit drugs: none  Cardiac symptoms: none  Home BP: yes, 142/60  Medication compliance: yes  Diet: regular, diabetic, Mediterranean regular  Caffeine: none  Labs:   Last Echo: 04/2018  Last stress test: 06/2016  Cardiovascular angiogram:   ECG: 10/2019  Fundoscopic exam:     No flowsheet data found.      HPI    ROS     Objective:    Physical Exam      Assessment:       No diagnosis found.     Plan:         There are no diagnoses linked to this encounter.            "

## 2020-02-14 NOTE — PROGRESS NOTES
Subjective:    Patient ID:  Blaire Cage is a 89 y.o. female who presents for evaluation of 6 month f/u  For HTN, nausea, weakness, tachycardia and dehydration  PCP: Dr. Mauricio, now Dr. Edmonds see every 3-4 months  Hematologist: Dr. Alvarez  GI: Dr. Flores, considering a colonoscopy  Renal: Dr. Jo  Prior cardiologist: Dr. 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    Health literacy: medium  Activities: very active around the house except for past 3-4 days, feels like viral syndrome, walking without problem, do not feel limited  Nicotine: quit 4/2015, 15 py  Alcohol: no  Illicit drugs: no  Cardiac symptoms: none  Home BP: 130/68  Medication compliance: yes  Diet: regular, Low K+ diet  Caffeine: none recently  Labs: 5/2017 LDL 78.2, 8/2019, CMP (BUN 46, Cr 1.8, eGFR 24.6), CBC (Hgb 9.2), iron sat 16%, ferritin 261  Lab Results   Component Value Date    TSH 0.965 12/14/2019        Lab Results   Component Value Date    HGBA1C 6.0 (H) 12/14/2019       Lab Results   Component Value Date    WBC 6.18 02/05/2020    HGB 9.1 (L) 02/05/2020    HCT 30.7 (L) 02/05/2020     (H) 02/05/2020     02/05/2020       CMP  Sodium   Date Value Ref Range Status   02/05/2020 139 136 - 145 mmol/L Final     Potassium   Date Value Ref Range Status   02/05/2020 5.1 3.5 - 5.1 mmol/L Final     Chloride   Date Value Ref Range Status   02/05/2020 106 95 - 110 mmol/L Final     CO2   Date Value Ref Range Status   02/05/2020 22 (L) 23 - 29 mmol/L Final     Glucose   Date Value Ref Range Status   02/05/2020 42 (LL) 70 - 110 mg/dL Final     Comment:     *Critical value -  Results  called to and read back by:DR JENNIFER HERNANDEZ        BUN, Bld   Date Value Ref Range Status   02/05/2020 61 (H) 8 - 23 mg/dL Final     Creatinine   Date Value Ref Range Status   02/05/2020 1.6 (H) 0.5 - 1.4 mg/dL Final   03/04/2013 1.1 0.5 - 1.4 mg/dL Final     Calcium   Date Value Ref Range Status   02/05/2020 9.2 8.7 - 10.5 mg/dL Final   03/04/2013 9.8 8.7 - 10.5 mg/dL Final     Total Protein   Date Value Ref Range Status   02/05/2020 7.4 6.0 - 8.4 g/dL Final     Albumin   Date Value Ref Range Status   02/05/2020 3.4 (L) 3.5 - 5.2 g/dL Final     Total Bilirubin   Date Value Ref Range Status   02/05/2020 0.2 0.1 - 1.0 mg/dL Final     Comment:     For infants and newborns, interpretation of results should be based  on gestational age, weight and in agreement with clinical  observations.  Premature Infant recommended reference ranges:  Up to 24 hours.............<8.0 mg/dL  Up to 48 hours............<12.0 mg/dL  3-5 days..................<15.0 mg/dL  6-29 days.................<15.0 mg/dL       Alkaline Phosphatase   Date Value Ref Range Status   02/05/2020 65 55 - 135 U/L Final   03/04/2013 60 55 - 135 U/L Final     AST   Date Value Ref Range Status   02/05/2020 26 10 - 40 U/L Final   03/04/2013 21 10 - 40 U/L Final     ALT   Date Value Ref Range Status   02/05/2020 14 10 - 44 U/L Final     Anion Gap   Date Value Ref Range Status   02/05/2020 11 8 - 16 mmol/L Final   03/04/2013 10 5 - 15 meq/L Final     eGFR if    Date Value Ref Range Status   02/05/2020 32.7 (A) >60 mL/min/1.73 m^2 Final     eGFR if non    Date Value Ref Range Status   02/05/2020 28.4 (A) >60 mL/min/1.73 m^2 Final     Comment:     Calculation used to obtain the estimated glomerular filtration  rate (eGFR) is the CKD-EPI equation.        @labrcntip(troponini)@  BNP  @LABRCNTIP(BNP,BNPTRIAGEBLO)@    Lab Results   Component Value Date    CHOL 186 12/14/2019    CHOL 149 05/23/2017    CHOL 142 09/01/2016     Lab Results  "  Component Value Date    HDL 58 12/14/2019    HDL 52 05/23/2017    HDL 43 09/01/2016     Lab Results   Component Value Date    LDLCALC 105.0 12/14/2019    LDLCALC 78.2 05/23/2017    LDLCALC 76.8 09/01/2016     Lab Results   Component Value Date    TRIG 115 12/14/2019    TRIG 94 05/23/2017    TRIG 111 09/01/2016     Lab Results   Component Value Date    CHOLHDL 31.2 12/14/2019    CHOLHDL 34.9 05/23/2017    CHOLHDL 30.3 09/01/2016      Last Echo: 04/25/2018  Last stress test: 6/22/2016  Cardiovascular angiogram: none  ECG: SB, 60 bpm, PRWP, LAE  Fundoscopic exam: usually yearly, no retinopathy    In 10/2016:  DCS - "85 y.o. female with PMH of COPD, hyperlipidemia, hypertension, hypothyroidism, GERD and arthritis presents to the ED for evaluation of lightheadedness "room spinning" with Nausea and vomiting. She states she was getting ready for [employer] and was standing at counter when she began to have "spinning, so I sat down in chair and started vomiting." She reports near syncope while talking with family on phone. Denies chest pain and shortness of breath. She is asymptomatic at this time. Patient noted to have bradycardia on telemetry.    Hospital Course (synopsis of major diagnoses, care, treatment, and services provided during the course of the hospital stay): Patient with bradycardia. Negative orthostatics. Patiens HR improved changing metoprolol to coreg. She was noted to have hypotension which improved with gentle hydration. Patient feeling better without complaints. Denies further N/V since admittance. Will decreased norvasc to 5 mg daily."    Old record reviewed, had Echo in 10/2015 - CONCLUSIONS     1 - Concentric hypertrophy. LV wall thickness is abnormal, with the septum and the posterior wall each measuring 1.3 cm across.    2 - Normal left ventricular systolic function (EF 60-65%).     3 - Left ventricular diastolic dysfunction. E/e'(lat) is 31    4 - Normal right ventricular systolic function .     5 - " "Pulmonary hypertension. The estimated PA systolic pressure is 47 mmHg.     6 - Trivial to mild aortic stenosis, JOHN = 1.72 cm2, peak velocity = 1.75 m/s, mean gradient = 7.0 mmHg.     7 - Mild mitral regurgitation.     8 - Mild tricuspid regurgitation.     Event monitor, 30 days - TEST DESCRIPTION   8 episodes of "heart racing" - in NSR with PAC, rate 59 to 113 bpm.  5 episodes of "lightheaded" - in NSR with PAC, rate 58 to 84 bpm.  3 episodes of "chest pain" - NSR with PAC, rate 67 to 88 bpm. Without ST abnormalities.  4 episodes of SOB - NSR with PAC, rate 67 to 103 bpm.  1 episode of isolated "skipped beat" - NSR with PAC, rate 76 bpm.  Max heart rate 130, minimum HR of 50, and average HR of 72 bpm.    CONCLUSIONS   Multiple symptoms reported, frequent PACs noted, rare PVC detected.   Chest pain noted without ST changes.    Since DC occasional WEST, no CP, no dizziness. Quit smoking for past 3 weeks. Lots of CV risk factors. Last lipid panel in 8/2015, LDL 94, non-. Last A1C 6.3% in 6/2016.    In 10/2016, here post hospital visit. DCS - "admitted for syncope and symptomatic bradycardia. She was on a BB at admission which was stopped. She had no further symptoms and was stable throughout her admission. Patient was seen by cardiology and felt not to be a candidate for PPM placement. She was set up with an event monitor at time of discharge and F/U with cardiology."  Chart reviewed had bradycardia with rate down to 37 on Coreg 3.125 mg bid.  Reviewed by Dr. Driscoll - "IMPRESSION:  1. Syncopal episode could be due to drug-induced i.e., Coreg, possible    vasovagal.  2. Hypertension.  3. History of COPD.     RECOMMENDATIONS:  1. From cardiac standpoint, we would recommend to check for orthostatic    changes.  2. Event monitor recorder to rule out any arrhythmias. The patient needs to    follow up with Dr. Herbert.  3. To avoid heart rate slowing drugs, recommend to take off the Coreg and use    drugs, which do not " slow the heart rate.    DSE 6/2016 - EKG Conclusions:    1. The EKG portion of this study is negative for ischemia at a peak heart rate of 125 bpm (95% of predicted).   2. Blood pressure remained stable throughout the protocol  (Presenting BP: 147/87 Peak BP: 205/64).   3. The following arrhythmias were present: PVCs.   4. There were no symptoms of chest discomfort or significant dyspnea throughout the protocol.     ECHO CONCLUSIONS     1 - Concentric remodeling. the septum measuring 1.2 cm and the posterior wall measuring 1.3 cm across.    2 - Hyperdynamic left ventricular systolic function (EF 65-70%).     3 - Left ventricular diastolic dysfunction. E/e'(lat) is 19    4 - Normal right ventricular systolic function .     5 - Pulmonary hypertension. The estimated PA systolic pressure is 47 mmHg.     6 - Small pericardial effusion.     7 - No siginificant change from Echo in 10/2015.     No evidence of stress induced myocardial ischemia.     At home have good and bad (tired) days. Stay active, do own housework. Walking around the yard without problems. ECG shows NSR, PAC rate 73. Orthostatic VS shows no significant drop in BP and HR 72-75 bpm. Event monitor in place. No further near-syncope.     In 11/2016, no new problem. Home BP log reviewed 102-188/50-80, HR 60-85, on amlodipine 10 mg daily, 42% of readings with SBP > 150, mostly in the afternoon where 43 out of 55 readings with SBP >150. Problem with Coreg as noted. The only time with symptoms is when the BP readings are elevated. Have significant CKD stage III, eGFR 39 with marked anemia, Hgb 10.6 with iron deficiency, iron sat only 12% and ferritin at 42. On iron supplement bid.     In 12/2016, had recurrent syncope in October and November 2016. Noted constipation with the iron supplement, at times no BM for 3 days. Labs reviewed eGFR 31, have pre-renal azotemia BUN/Cr 34/1.7, Hgb 10.6. Report drinking about 80 oz daily, IOM recommendation for women is 91 oz  "daily.  ED note 11/11/2016 - "86 y.o. female with a pmhx of Anticoagulant long-term use; COPD; Diabetes mellitus type II; ; Hyperlipidemia; Hypertension; and Thyroid disease presenting to the E.D. with generalized weakness. Pt states she had a large bowel movement after being constipated recently and subsequently became dizzy upon standing up from the commode. She has had similar episodes of dizziness upon positional changes. Denies fever, chest pain, blood in stool, HA, visual changes, numbness, specific weakness. Past Surgical History: hysterectomy, appendectomy.     86 y.o. female presenting with episode of syncope preceded by positional change with onset of symptoms. Workup was undertaken based on patient's age. She is asymptomatic since arrival. She was given 500 cc normal saline solution IV. Upon standing following fluids, she feels well with no significant orthostasis or difficulty walking. Very low suspicion for ACS and I do not think cardiac biomarker is indicated. EKG reviewed with no sign of acute ischemic process or arrhythmia. No arrhythmia evident here on monitor. Per medical record reviewed with 30 day event monitor results showing no sign of significant arrhythmia. I did speak with her cardiologist Dr. Herbert who agreed with discharge and outpatient follow-up if patient is doing well with ambulation and no significant orthostatic symptoms. Patient has baseline renal insufficiency with creatinine similar to prior. She has a stable anemia. Low suspicion for other emergent process. Very low suspicion for emergent intracranial process. I had discussed extensively with family and patient has significant family support. They are comfortable taking her home. Outpatient PCP or cardiology follow-up recommended next week. Detailed return precautions reviewed."    In 2/2017, here post ED visit for nausea, weakness, tachycardia, and dehydration because did not drink 100 oz of fluid, improved with IVF. Since ED " "remain active, no problem. Home /70. Worry about walking due to possible fall after being tripped by a dog, 17 years ago.     In 12/2018 here for post hospital review:  DCS - "88 y.o. female with DM type 2, Hypertension, diastolic HF, COPD, prior PE on Apixaban, and recurrent syncope who presents to the ED for evaluation of syncope that occurred this morning.  She states she was sitting at the table and "just didn't feel good with lightheadedness" and passed out associated with nausea and vomiting x 1.  According to her daughter, the patient was unconscious for approximately 5 minutes, came to and then became unconscious a second time while on the phone with EMS.  Patient states she does not recall passing out. The patient/daughter deny head trauma, difficulty urinating or any other symptoms at this time. Patient reports "I have been having fainting my whole life, especially when I listening or talking about sad things."  Patient has history of recurrent syncopal events in the past and is followed by cardiology. Patient was evaluated in the ER where her orthostatic vitals in ED were positive. Patient was placed in the hospital for further evaluation and treatment.      * No surgery found *       Hospital Course:   Patient monitored closely during observation stay. She was found to have significant orthostatic hypotension. Home BP medications  and lasix were placed on hold.  She received IVF for hydration. Cardiology was consulted. MRI of the brain without contrast showed there was no acute abnormality. There was generalized volume loss and nonspecific white matter change.  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 There was no hemorrhage, mass effect or acute infarctionobtained and no acute process demonstrated. PT and OT were consulted for debility. There were concerns patient lived at home alone on OAC with history of recurrent syncope noted. These concerns were " "discussed with patient along with possible option for placement. Patient reported she wanted to return living back to her home and that her children looked in on her often. She did agree to Home Health. It was discussed with patient that she should get a Life Alert System or something equivalent to that for home use and she said she would discuss that with her children. Patient was noted to have improvement in her condition. Her SHALINI resolved. Her orthostatics later were no longer positive. Patient had no signs of syncope or presyncope and she was discharged to home once cleared by Cardiology. Her home Norvasc and lasix doses were reduced. Home Health was ordered for RN, PT, OT evaluate and treat."    My note - reviewed history of lifelong recurrent syncope which makes vaso-vagal the most likely etiology, at her age she is at risk for arrhythmic issues also. ILR is a reasonable option. She have colonoscopy schedule this Monday, she will need to be monitored closely. ILR can be done as OP when she decide to proceed.     In 8/2019, here for follow up, no heart worries. ILR reviewed, no significant arrhythmia, noted tachycardia due to T-wave oversensing. Multiple falls x 2 and broke left hip in 3/2019, return to ED after missing the chair on trying to sit.   Echo 4/2018  Left ventricle ejection fraction is normal.  Tricuspid valve shows mild regurgitation.  Left ventricular diastolic filling is abnormal.  Left atrium is moderately dilated.  Left ventricle shows moderate concentric hypertrophy.  Mitral Valve: The following structural abnormalities were observed: non-specific thickening. There is mild valve leaflet thickening. There is mild valve leaflet calcification. There is moderate mitral annular calcification. Mild stenosis.    HPI comments: in 2/2020 here for 6-months review. No heart worries. Remain active with housework and cooking, no problem    Review of Systems   Constitution: Positive for malaise/fatigue and " weight loss (8 lbs since 12/2018). Negative for diaphoresis, fever, night sweats and weight gain.   HENT: Positive for hoarse voice and odynophagia. Negative for nosebleeds and tinnitus.    Eyes: Negative for visual disturbance.   Cardiovascular: Negative for chest pain, claudication, cyanosis, dyspnea on exertion, irregular heartbeat, leg swelling, near-syncope, orthopnea, palpitations and paroxysmal nocturnal dyspnea.   Respiratory: Positive for wheezing. Negative for cough, shortness of breath, sleep disturbances due to breathing and snoring.         Colbert score 2 to 4   Endocrine: Positive for cold intolerance and polyuria. Negative for polydipsia.   Hematologic/Lymphatic: Does not bruise/bleed easily.   Skin: Positive for dry skin and poor wound healing. Negative for color change, flushing, nail changes and suspicious lesions.   Musculoskeletal: Positive for arthritis, back pain, falls, joint pain and muscle cramps. Negative for gout, joint swelling, muscle weakness and myalgias.   Gastrointestinal: Positive for constipation, flatus and melena (from iron supplement). Negative for heartburn, hematemesis, hematochezia and nausea.   Genitourinary: Positive for bladder incontinence, dysuria, flank pain and nocturia.   Neurological: Negative for disturbances in coordination, excessive daytime sleepiness, dizziness, focal weakness, headaches, light-headedness, loss of balance, numbness, vertigo and weakness.   Psychiatric/Behavioral: Positive for depression (lost son 3/21/2016, post heart surgery). Negative for substance abuse. The patient does not have insomnia and is not nervous/anxious.             Objective:    Physical Exam   Constitutional: She is oriented to person, place, and time. She appears well-developed and well-nourished.   HENT:   Head: Normocephalic.   Eyes: Pupils are equal, round, and reactive to light. Conjunctivae and EOM are normal.   Neck: Normal range of motion. Neck supple. No JVD present.  "No thyromegaly present.   Circumference 15.25"   Cardiovascular: Normal rate, regular rhythm and intact distal pulses. Exam reveals distant heart sounds. Exam reveals no gallop and no friction rub.   Murmur heard.   Medium-pitched machinery early systolic murmur is present with a grade of 2/6 at the upper right sternal border and upper left sternal border. Varicose veins  Pulses:       Dorsalis pedis pulses are 1+ on the right side, and 1+ on the left side.        Posterior tibial pulses are 1+ on the right side, and 1+ on the left side.   Superficial varicose veins in both LE.   Pulmonary/Chest: Effort normal and breath sounds normal. She has no rales. She exhibits no tenderness.   Diminished breath sounds   Abdominal: Soft. Bowel sounds are normal. There is no tenderness.   Waist 36" down to 34"", hip 40.5"   Musculoskeletal: Normal range of motion. She exhibits no edema.   Lymphadenopathy:     She has no cervical adenopathy.   Neurological: She is alert and oriented to person, place, and time.   Skin: Skin is warm and dry. No rash noted.   Poor turgor           Assessment:       1. Left ventricular diastolic dysfunction with preserved systolic function    2. Hypoalbuminemia    3. Type 2 diabetes mellitus with stage 4 chronic kidney disease, without long-term current use of insulin    4. Hypoglycemia    5. Smoker, quit 5/2016, 25 pck-years    6. Normocytic normochromic anemia    7. Iron deficiency anemia due to chronic blood loss    8. Hypertensive left ventricular hypertrophy, without heart failure    9. Hyperlipidemia associated with type 2 diabetes mellitus    10. Prerenal azotemia         Plan:       Blaire was seen today for follow-up.    Diagnoses and all orders for this visit:    Left ventricular diastolic dysfunction with preserved systolic function    Hypoalbuminemia    Type 2 diabetes mellitus with stage 4 chronic kidney disease, without long-term current use of insulin  -     furosemide (LASIX) 20 MG " tablet; Take 1 tablet only as needed, for swelling, increase SOB, weight gain of 3 lbs overnight or 5 lbs for the week  Dispense: 24 tablet; Refill: 3    Hypoglycemia    Smoker, quit 5/2016, 25 pck-years    Normocytic normochromic anemia    Iron deficiency anemia due to chronic blood loss    Hypertensive left ventricular hypertrophy, without heart failure    Hyperlipidemia associated with type 2 diabetes mellitus    Prerenal azotemia  -     furosemide (LASIX) 20 MG tablet; Take 1 tablet only as needed, for swelling, increase SOB, weight gain of 3 lbs overnight or 5 lbs for the week  Dispense: 24 tablet; Refill: 3    - All medical issues reviewed, reduce Lasix  - Instruction for Mediterranean diet and heart healthy exercise given.  - Need to drink 100 oz of fluid daily  - CV status stable, all medications reviewed, patient acknowledge good understanding.  - Check home blood pressure, 2 days weekly, do 2 readings within 5 minutes in AM and PM, keep log for review.  - Consider use of walking sticks for ambulation.  - Recommend at least biannual cardiovascular evaluation in view of her significant risk factors. Patient's preference.    Patient Active Problem List   Diagnosis    Diabetic neuropathy, type II diabetes mellitus    CKD (chronic kidney disease), stage III, eGFR 39, progressive 31    Hypothyroidism    Hypertension associated with diabetes    Hyperlipidemia associated with type 2 diabetes mellitus    Type 2 diabetes mellitus with stage 4 chronic kidney disease    Right carotid bruit    COPD mixed type    Anxiety    Abdominal aortic aneurysm without rupture    Hip arthritis    Degenerative spinal arthritis    Osteoporosis    Left ventricular diastolic dysfunction with preserved systolic function    Hypertensive left ventricular hypertrophy, without heart failure    Smoker, quit 5/2016, 25 pck-years    Abdominal obesity    Absolute anemia    Body mass index (BMI) 23.0-23.9, adult, today 24.1     Iron deficiency anemia due to chronic blood loss    Proteinuria due to type 2 diabetes mellitus    History of syncope in childhood    Prerenal azotemia    Drug-induced constipation    COPD with acute exacerbation    Asthmatic bronchitis with acute exacerbation    Controlled type 2 diabetes mellitus, without long-term current use of insulin    Acute pulmonary embolism    Asthma-COPD overlap syndrome    Eosinophilic asthma    Moderate malnutrition    Type 2 diabetes mellitus with kidney complication, without long-term current use of insulin    Chronic anticoagulation    Constipation    DVT (deep venous thrombosis)    History of pulmonary embolism    Anemia due to multiple mechanisms    Anemia, chronic disease    Normocytic normochromic anemia    Anemia, chronic renal failure, stage 3 (moderate)    Homocysteinemia    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    Hyperkalemia    Diastolic CHF, chronic    Anemia of chronic disease    Kidney stones    Bradycardia    Macrocytic anemia    Orthostatic hypotension    Closed left hip fracture    Hip pain, left    On home oxygen therapy    Nonrheumatic aortic valve stenosis    Mitral stenosis    Hypoalbuminemia    Hypoglycemia     Greater than 50% was spent in counseling and coordination of care. The above assessment and plan have been discussed at length. Labs and procedure over the last 6 months reviewed. Problem List updated. Asked to bring in all active medications / pills bottles with next visit.

## 2020-02-16 ENCOUNTER — HOSPITAL ENCOUNTER (EMERGENCY)
Facility: HOSPITAL | Age: 85
Discharge: HOME | End: 2020-02-16
Attending: EMERGENCY MEDICINE
Payer: MEDICARE

## 2020-02-16 VITALS
HEART RATE: 63 BPM | TEMPERATURE: 98 F | SYSTOLIC BLOOD PRESSURE: 158 MMHG | OXYGEN SATURATION: 97 % | RESPIRATION RATE: 14 BRPM | DIASTOLIC BLOOD PRESSURE: 70 MMHG | BODY MASS INDEX: 21.28 KG/M2 | WEIGHT: 124 LBS

## 2020-02-16 DIAGNOSIS — N39.0 URINARY TRACT INFECTION WITHOUT HEMATURIA, SITE UNSPECIFIED: Primary | ICD-10-CM

## 2020-02-16 DIAGNOSIS — R10.84 GENERALIZED ABDOMINAL PAIN: ICD-10-CM

## 2020-02-16 LAB
ALBUMIN SERPL BCP-MCNC: 3.5 G/DL (ref 3.5–5.2)
ALP SERPL-CCNC: 64 U/L (ref 55–135)
ALT SERPL W/O P-5'-P-CCNC: 16 U/L (ref 10–44)
ANION GAP SERPL CALC-SCNC: 9 MMOL/L (ref 8–16)
AST SERPL-CCNC: 21 U/L (ref 10–40)
BACTERIA #/AREA URNS HPF: ABNORMAL /HPF
BASOPHILS # BLD AUTO: 0.03 K/UL (ref 0–0.2)
BASOPHILS NFR BLD: 0.5 % (ref 0–1.9)
BILIRUB SERPL-MCNC: 0.4 MG/DL (ref 0.1–1)
BILIRUB UR QL STRIP: NEGATIVE
BUN SERPL-MCNC: 61 MG/DL (ref 8–23)
CALCIUM SERPL-MCNC: 9.4 MG/DL (ref 8.7–10.5)
CHLORIDE SERPL-SCNC: 109 MMOL/L (ref 95–110)
CLARITY UR: CLEAR
CO2 SERPL-SCNC: 22 MMOL/L (ref 23–29)
COLOR UR: YELLOW
CREAT SERPL-MCNC: 1.7 MG/DL (ref 0.5–1.4)
DIFFERENTIAL METHOD: ABNORMAL
EOSINOPHIL # BLD AUTO: 0.1 K/UL (ref 0–0.5)
EOSINOPHIL NFR BLD: 2.3 % (ref 0–8)
ERYTHROCYTE [DISTWIDTH] IN BLOOD BY AUTOMATED COUNT: 13.6 % (ref 11.5–14.5)
EST. GFR  (AFRICAN AMERICAN): 30 ML/MIN/1.73 M^2
EST. GFR  (NON AFRICAN AMERICAN): 26 ML/MIN/1.73 M^2
GLUCOSE SERPL-MCNC: 110 MG/DL (ref 70–110)
GLUCOSE UR QL STRIP: NEGATIVE
HCT VFR BLD AUTO: 31 % (ref 37–48.5)
HGB BLD-MCNC: 9.9 G/DL (ref 12–16)
HGB UR QL STRIP: ABNORMAL
HYALINE CASTS #/AREA URNS LPF: 0 /LPF
IMM GRANULOCYTES # BLD AUTO: 0.03 K/UL (ref 0–0.04)
KETONES UR QL STRIP: NEGATIVE
LEUKOCYTE ESTERASE UR QL STRIP: ABNORMAL
LIPASE SERPL-CCNC: 33 U/L (ref 4–60)
LYMPHOCYTES # BLD AUTO: 1.3 K/UL (ref 1–4.8)
LYMPHOCYTES NFR BLD: 22.2 % (ref 18–48)
MCH RBC QN AUTO: 31.2 PG (ref 27–31)
MCHC RBC AUTO-ENTMCNC: 31.9 G/DL (ref 32–36)
MCV RBC AUTO: 98 FL (ref 82–98)
MICROSCOPIC COMMENT: ABNORMAL
MONOCYTES # BLD AUTO: 0.7 K/UL (ref 0.3–1)
MONOCYTES NFR BLD: 11.4 % (ref 4–15)
NEUTROPHILS # BLD AUTO: 3.8 K/UL (ref 1.8–7.7)
NEUTROPHILS NFR BLD: 63.1 % (ref 38–73)
NITRITE UR QL STRIP: NEGATIVE
NRBC BLD-RTO: 0 /100 WBC
PH UR STRIP: 6 [PH] (ref 5–8)
PLATELET # BLD AUTO: 178 K/UL (ref 150–350)
PMV BLD AUTO: 11.2 FL (ref 9.2–12.9)
POTASSIUM SERPL-SCNC: 4.9 MMOL/L (ref 3.5–5.1)
PROT SERPL-MCNC: 7.1 G/DL (ref 6–8.4)
PROT UR QL STRIP: ABNORMAL
RBC # BLD AUTO: 3.17 M/UL (ref 4–5.4)
RBC #/AREA URNS HPF: 2 /HPF (ref 0–4)
SODIUM SERPL-SCNC: 140 MMOL/L (ref 136–145)
SP GR UR STRIP: 1.01 (ref 1–1.03)
URN SPEC COLLECT METH UR: ABNORMAL
UROBILINOGEN UR STRIP-ACNC: NEGATIVE EU/DL
WBC # BLD AUTO: 6.03 K/UL (ref 3.9–12.7)
WBC #/AREA URNS HPF: 42 /HPF (ref 0–5)

## 2020-02-16 PROCEDURE — 83690 ASSAY OF LIPASE: CPT

## 2020-02-16 PROCEDURE — 81000 URINALYSIS NONAUTO W/SCOPE: CPT

## 2020-02-16 PROCEDURE — 36415 COLL VENOUS BLD VENIPUNCTURE: CPT

## 2020-02-16 PROCEDURE — 85025 COMPLETE CBC W/AUTO DIFF WBC: CPT

## 2020-02-16 PROCEDURE — 99284 EMERGENCY DEPT VISIT MOD MDM: CPT | Mod: 25

## 2020-02-16 PROCEDURE — 80053 COMPREHEN METABOLIC PANEL: CPT

## 2020-02-16 PROCEDURE — 63600175 PHARM REV CODE 636 W HCPCS: Performed by: EMERGENCY MEDICINE

## 2020-02-16 PROCEDURE — 96365 THER/PROPH/DIAG IV INF INIT: CPT

## 2020-02-16 PROCEDURE — 87086 URINE CULTURE/COLONY COUNT: CPT

## 2020-02-16 RX ORDER — CEPHALEXIN 500 MG/1
500 CAPSULE ORAL EVERY 6 HOURS
Qty: 20 CAPSULE | Refills: 0 | Status: SHIPPED | OUTPATIENT
Start: 2020-02-16 | End: 2020-02-21

## 2020-02-16 RX ADMIN — CEFTRIAXONE 1 G: 1 INJECTION, SOLUTION INTRAVENOUS at 02:02

## 2020-02-16 NOTE — ED NOTES
"Generalized abd. Pain and nausea  / weakness / " just not feeling well " Dr Anderson at bedside   "

## 2020-02-16 NOTE — ED PROVIDER NOTES
"Encounter Date: 2/16/2020    SCRIBE #1 NOTE: I, Arben Ch, am scribing for, and in the presence of, Dr. Anderson.       History     Chief Complaint   Patient presents with    Abdominal Pain     "I have not been feeling well for the past 5 days."        Time seen by provider: 1:26 PM on 02/16/2020    Blaire Cage is a 89 y.o. female with a history of HTN, HLD, DM, COPD, DVT, PNA, and PE who presents to the ED with c/o generalized abdominal pain for the past 5 days. The patient reports to also experience headaches, decrease in appetite, increased frequency in urination, pain with urination, nausea but no vomiting. She denies diarrhea, cp, sob, or constipation. The patient notes that she has not taken anything for pain. She also notes to have a history of UTI's SHx includes an appendectomy and hysterectomy. Drug allergies carvedilol, boniva, codeine, lisinopril, morphine, kionex noted.    The history is provided by the patient.     Review of patient's allergies indicates:   Allergen Reactions    Carvedilol Other (See Comments)     Bradycardia and syncope    Boniva [ibandronate]     Codeine     Hydralazine analogues     Iodinated contrast media Hives    Kionex [sodium polystyrene sulfonate] Diarrhea     From encounter 12/11/17 for diarrhea--not true allergy.   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 8/7/2018    Hip arthritis 3/1/2016    Homocysteinemia 8/7/2018    Hyperlipidemia     " Hypertension     Normocytic normochromic anemia 2018    PE (pulmonary thromboembolism) 2018    Pneumonia of right lower lobe due to infectious organism 2017    Thyroid disease     hypothyroid    Type 2 diabetes mellitus with stage 3 chronic kidney disease 2013     Past Surgical History:   Procedure Laterality Date    APPENDECTOMY      CHOLECYSTECTOMY      FRACTURE SURGERY      right hip     HERNIA REPAIR      groin    HYSTERECTOMY      INSERTION OF IMPLANTABLE LOOP RECORDER N/A 2018    Procedure: Insertion, Implantable Loop Recorder;  Surgeon: Ashok Herbert MD;  Location: [facility] CATH LAB;  Service: Cardiology;  Laterality: N/A;    PERCUTANEOUS PINNING OF HIP Left 3/23/2019    Procedure: PINNING, HIP, PERCUTANEOUS;  Surgeon: Jorje Hamlin MD;  Location: [facility] OR;  Service: Orthopedics;  Laterality: Left;    VARICOSE VEIN SURGERY       Family History   Problem Relation Age of Onset    Hypertension Mother     Heart disease Mother     Lung disease Father     Diabetes Sister     Diabetes Brother     Kidney disease Son      Social History     Tobacco Use    Smoking status: Former Smoker     Packs/day: 0.35     Years: 44.00     Pack years: 15.40     Types: Cigarettes     Last attempt to quit: 2015     Years since quittin.8    Smokeless tobacco: Never Used    Tobacco comment: 2015   Substance Use Topics    Alcohol use: No     Alcohol/week: 0.0 standard drinks     Frequency: Never     Binge frequency: Never    Drug use: No     Review of Systems   Constitutional: Positive for appetite change. Negative for activity change, diaphoresis and fever.   HENT: Negative for ear pain, rhinorrhea, sore throat and trouble swallowing.    Eyes: Negative for pain and visual disturbance.   Respiratory: Negative for cough, shortness of breath and stridor.    Cardiovascular: Negative for chest pain.   Gastrointestinal: Positive for abdominal pain and nausea. Negative for  blood in stool, constipation, diarrhea and vomiting.   Genitourinary: Positive for dysuria and frequency. Negative for hematuria, vaginal bleeding and vaginal discharge.   Musculoskeletal: Positive for back pain. Negative for gait problem.   Skin: Negative for rash and wound.   Neurological: Positive for headaches. Negative for seizures.   Psychiatric/Behavioral: Negative for hallucinations and suicidal ideas.       Physical Exam     Initial Vitals [02/16/20 1218]   BP Pulse Resp Temp SpO2   (!) 128/58 67 14 98.1 °F (36.7 °C) 99 %      MAP       --         Physical Exam    Nursing note and vitals reviewed.  Constitutional: She appears well-developed. She is not diaphoretic. No distress.   Elderly appearing.   HENT:   Head: Normocephalic and atraumatic.   Nose: Nose normal.   Eyes: EOM are normal. No scleral icterus.   Neck: Normal range of motion. Neck supple.   Cardiovascular: Normal rate, regular rhythm, normal heart sounds and intact distal pulses. Exam reveals no gallop and no friction rub.    No murmur heard.  Pulmonary/Chest: Breath sounds normal. No stridor. No respiratory distress. She has no wheezes. She has no rhonchi. She has no rales.   Abdominal: Soft. Bowel sounds are normal. There is generalized tenderness. There is no rebound, no guarding and no CVA tenderness.   Musculoskeletal: Normal range of motion.   Neurological: She is alert and oriented to person, place, and time. No cranial nerve deficit.   Skin: Skin is warm and dry. Capillary refill takes less than 2 seconds. No rash noted.   Psychiatric: She has a normal mood and affect.         ED Course   Procedures  Labs Reviewed   CBC W/ AUTO DIFFERENTIAL   COMPREHENSIVE METABOLIC PANEL   LIPASE   URINALYSIS, REFLEX TO URINE CULTURE          Imaging Results    None          Medical Decision Making:   History:   Old Medical Records: I decided to obtain old medical records.  Independently Interpreted Test(s):   I have ordered and independently  interpreted EKG Reading(s) - see prior notes  Clinical Tests:   Lab Tests: Ordered and Reviewed  Radiological Study: Ordered and Reviewed  Medical Tests: Ordered and Reviewed  ED Management:  Patient with no significant medical history who presents with dysuria and mild suprapubic pain/tenderness most likely secondary to uncomplicated cystitis without overt e/o infected stone.    No h/o STDs, no vaginal discharge. Not Ectopic.Unlikely gonorrhea/chlamydia.  Unlikely TOA, Ovarian Torsion, PID. Unlikely AAA, cholecystitis, pancreatitis, SBO, appendicitis, or other acute abdomen.    No history relapse (prior infection this month).  CT abd/pel done and neg for acute pathology. Discussed all imaging findings with patient and family.   Given rocephin in ED and plan discharge home on abx. Afebrile. Non toxic. Hemodynamically stable. No active nausea/vomiting. Not immunocompromised. Not requiring aggressive IVF resuscitation. Patient is able to afford the antibiotics. Stable for discharge and outpatient trial of PO antibiotics. Pt understands and agrees with discharge instructions. Pt also given strict return precautions for any new or worsening symptoms and plans to follow up closely with PCP.               Scribe Attestation:   Scribe #1: I performed the above scribed service and the documentation accurately describes the services I performed. I attest to the accuracy of the note.      Attending Attestation:     Physician Attestation for Scribe:    I, Dr. Giacomo Anderson, personally performed the services described in this documentation.   All medical record entries made by the scribe were at my direction and in my presence.   I have reviewed the chart and agree that the record is accurate and complete.   Giacomo Anderson MD  2:38 PM 02/17/2020     DISCLAIMER: This note was prepared with [employer] voice recognition transcription software. Garbled syntax, mangled pronouns, and other bizarre constructions may  be attributed to that software system.          ED Course as of Feb 16 2024   Sun Feb 16, 2020   1322 88 yo F pmhx of COPD, hyperlipidemia, hypertension, hypothyroidism, GERD and arthritis pw generalized fatigue, dysuria and increased urinary frequency.     [BD]   1707 Impression      1. No acute intra-abdominal process appreciated.  There is possible constipation present.  2. Diverticulosis coli.  3. Few scattered nonspecific mildly prominent loops of small bowel with air-fluid levels clustered in the central abdomen.  No high-grade bowel obstruction appreciated.  4. Bilateral nephrolithiasis, similar to the prior study.  5. 13 x 25 mm hypodensity within the pancreatic head, most likely a pancreatic pseudocyst, similar to the previous study.  6. Fusiform distal infrarenal abdominal aortic aneurysm measuring 4.6 x 4.2 cm in dimension, larger than at the time of the prior study.  7. Extensive atherosclerotic calcification within the thoracoabdominal aorta and its branches with particularly pronounced calcified plaque deposition noted at the origin of the SMA resulting in 80-95% luminal stenosis.  8. Status post hysterectomy.  9. Bilateral Bochdalek's hernias involving the posterior left and right hemidiaphragm, similar to the previous study.      Electronically signed by: Aubrey Davis MD  Date: 02/16/2020  Time: 16:53        [BD]      ED Course User Index  [BD] Giacomo Anderson MD                Clinical Impression:   No diagnosis found.      Disposition:   Disposition: Discharged  Condition: Stable                     Giacomo Anderson MD  02/17/20 1441       Giacomo Anderson MD  02/17/20 1442

## 2020-02-16 NOTE — ED NOTES
Resting in bed several family members at bedside. Lab drawing blood. Call light in reach aware to notify nurse of needs or concerns.

## 2020-02-17 ENCOUNTER — PES CALL (OUTPATIENT)
Dept: ADMINISTRATIVE | Facility: CLINIC | Age: 85
End: 2020-02-17

## 2020-02-18 LAB
BACTERIA UR CULT: NORMAL
BACTERIA UR CULT: NORMAL

## 2020-02-20 ENCOUNTER — OFFICE VISIT (OUTPATIENT)
Dept: FAMILY MEDICINE | Facility: CLINIC | Age: 85
End: 2020-02-20
Payer: MEDICARE

## 2020-02-20 VITALS
WEIGHT: 123.88 LBS | DIASTOLIC BLOOD PRESSURE: 52 MMHG | HEIGHT: 64 IN | OXYGEN SATURATION: 98 % | SYSTOLIC BLOOD PRESSURE: 146 MMHG | TEMPERATURE: 98 F | HEART RATE: 56 BPM | BODY MASS INDEX: 21.15 KG/M2

## 2020-02-20 DIAGNOSIS — E11.59 HYPERTENSION ASSOCIATED WITH DIABETES: ICD-10-CM

## 2020-02-20 DIAGNOSIS — E11.69 HYPERLIPIDEMIA ASSOCIATED WITH TYPE 2 DIABETES MELLITUS: ICD-10-CM

## 2020-02-20 DIAGNOSIS — I15.2 HYPERTENSION ASSOCIATED WITH DIABETES: ICD-10-CM

## 2020-02-20 DIAGNOSIS — N30.00 ACUTE CYSTITIS WITHOUT HEMATURIA: Primary | ICD-10-CM

## 2020-02-20 DIAGNOSIS — E11.22 TYPE 2 DIABETES MELLITUS WITH STAGE 3 CHRONIC KIDNEY DISEASE, WITHOUT LONG-TERM CURRENT USE OF INSULIN: ICD-10-CM

## 2020-02-20 DIAGNOSIS — E44.0 MODERATE MALNUTRITION: ICD-10-CM

## 2020-02-20 DIAGNOSIS — N18.30 TYPE 2 DIABETES MELLITUS WITH STAGE 3 CHRONIC KIDNEY DISEASE, WITHOUT LONG-TERM CURRENT USE OF INSULIN: ICD-10-CM

## 2020-02-20 DIAGNOSIS — E78.5 HYPERLIPIDEMIA ASSOCIATED WITH TYPE 2 DIABETES MELLITUS: ICD-10-CM

## 2020-02-20 DIAGNOSIS — I11.0 HYPERTENSIVE HEART DISEASE WITH HEART FAILURE: ICD-10-CM

## 2020-02-20 DIAGNOSIS — J44.9 COPD MIXED TYPE: ICD-10-CM

## 2020-02-20 DIAGNOSIS — E03.9 HYPOTHYROIDISM, UNSPECIFIED TYPE: ICD-10-CM

## 2020-02-20 PROCEDURE — 99999 PR PBB SHADOW E&M-EST. PATIENT-LVL III: CPT | Mod: PBBFAC,,, | Performed by: NURSE PRACTITIONER

## 2020-02-20 PROCEDURE — 99999 PR PBB SHADOW E&M-EST. PATIENT-LVL III: ICD-10-PCS | Mod: PBBFAC,,, | Performed by: NURSE PRACTITIONER

## 2020-02-20 PROCEDURE — 99214 OFFICE O/P EST MOD 30 MIN: CPT | Mod: S$GLB,,, | Performed by: NURSE PRACTITIONER

## 2020-02-20 PROCEDURE — 1101F PT FALLS ASSESS-DOCD LE1/YR: CPT | Mod: CPTII,S$GLB,, | Performed by: NURSE PRACTITIONER

## 2020-02-20 PROCEDURE — 99499 UNLISTED E&M SERVICE: CPT | Mod: S$GLB,,, | Performed by: NURSE PRACTITIONER

## 2020-02-20 PROCEDURE — 1126F PR PAIN SEVERITY QUANTIFIED, NO PAIN PRESENT: ICD-10-PCS | Mod: S$GLB,,, | Performed by: NURSE PRACTITIONER

## 2020-02-20 PROCEDURE — 1159F MED LIST DOCD IN RCRD: CPT | Mod: S$GLB,,, | Performed by: NURSE PRACTITIONER

## 2020-02-20 PROCEDURE — 1159F PR MEDICATION LIST DOCUMENTED IN MEDICAL RECORD: ICD-10-PCS | Mod: S$GLB,,, | Performed by: NURSE PRACTITIONER

## 2020-02-20 PROCEDURE — 99499 RISK ADDL DX/OHS AUDIT: ICD-10-PCS | Mod: S$GLB,,, | Performed by: NURSE PRACTITIONER

## 2020-02-20 PROCEDURE — 1101F PR PT FALLS ASSESS DOC 0-1 FALLS W/OUT INJ PAST YR: ICD-10-PCS | Mod: CPTII,S$GLB,, | Performed by: NURSE PRACTITIONER

## 2020-02-20 PROCEDURE — 1126F AMNT PAIN NOTED NONE PRSNT: CPT | Mod: S$GLB,,, | Performed by: NURSE PRACTITIONER

## 2020-02-20 PROCEDURE — 99214 PR OFFICE/OUTPT VISIT, EST, LEVL IV, 30-39 MIN: ICD-10-PCS | Mod: S$GLB,,, | Performed by: NURSE PRACTITIONER

## 2020-02-20 NOTE — PROGRESS NOTES
Subjective:       Patient ID: Blaire Cage is a 89 y.o. female.    Chief Complaint: Headache      Blaire Cage is a 89 y.o. female with a history of HTN, HLD, DM, COPD, DVT, PNA, and PE presents today with daughter for ED follow up. Patient was seen for Acute cystis. Patient was given rocephin in ED and  discharge home on cephALEXin (KEFLEX) 500 MG capsule. 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 8/7/2018    Hip arthritis 3/1/2016    Homocysteinemia 8/7/2018    Hyperlipidemia     Hypertension     Normocytic normochromic anemia 6/29/2018    PE (pulmonary thromboembolism) 06/09/2018    Pneumonia of right lower lobe due to infectious organism 9/11/2017    Thyroid disease     hypothyroid    Type 2 diabetes mellitus with stage 3 chronic kidney disease 1/18/2013       Review of patient's allergies indicates:   Allergen Reactions    Carvedilol Other (See Comments)     Bradycardia and syncope    Boniva [ibandronate]     Codeine     Hydralazine analogues     Iodinated contrast media Hives    Kionex [sodium polystyrene sulfonate] Diarrhea     From encounter 12/11/17 for diarrhea--not true allergy.    Lisinopril     Morphine          Current Outpatient Medications:     acetaminophen (TYLENOL) 325 MG tablet, Take 2 tablets (650 mg total) by mouth every 4 (four) hours as needed. (Patient taking differently: Take 650 mg by mouth every 4 (four) hours as needed for Pain. ),  Disp: , Rfl: 0    albuterol-ipratropium (DUO-NEB) 2.5 mg-0.5 mg/3 mL nebulizer solution, Take 3 mLs by nebulization every 6 (six) hours as needed for Wheezing. Rescue, Disp: 120 vial, Rfl: 11    amLODIPine (NORVASC) 5 MG tablet, TAKE 1 TABLET BY MOUTH ONCE DAILY, Disp: 90 tablet, Rfl: 2    apixaban (ELIQUIS) 5 mg Tab, Take 1 tablet (5 mg total) by mouth 2 (two) times daily., Disp: 180 tablet, Rfl: 3    ascorbic acid (VITAMIN C) 500 MG tablet, Take 500 mg by mouth once daily.  , Disp: , Rfl:     aspirin (ECOTRIN) 81 MG EC tablet, Take 81 mg by mouth once daily.  , Disp: , Rfl:     calcium carbonate (OS-TASIA) 500 mg calcium (1,250 mg) tablet, Take 1 tablet by mouth 2 (two) times daily.  , Disp: , Rfl:     ciprofloxacin HCl (CIPRO) 250 MG tablet, Take 1 tablet (250 mg total) by mouth 2 (two) times daily. for 7 days, Disp: 14 tablet, Rfl: 0    cyanocobalamin/folic acid (FOLTRATE ORAL), vitamin B12-folic acid  2.5 - 25-2MG, Disp: , Rfl:     docusate sodium (COLACE) 100 MG capsule, Take 1 capsule (100 mg total) by mouth 2 (two) times daily., Disp: 90 capsule, Rfl: 0    ferrous sulfate (FEOSOL) 325 mg (65 mg iron) Tab tablet, Take 1 tablet (325 mg total) by mouth 2 (two) times daily with meals., Disp: 180 tablet, Rfl: 3    fish oil-omega-3 fatty acids 300-1,000 mg capsule, Take 2 g by mouth every evening. , Disp: , Rfl:     fluocinolone (SYNALAR) 0.01 % external solution, Apply topically 2 (two) times daily., Disp: 90 mL, Rfl: 1    fluticasone (FLONASE) 50 mcg/actuation nasal spray, 2 sprays by Each Nare route once daily., Disp: 48 g, Rfl: 3    fluticasone-umeclidin-vilanter (TRELEGY ELLIPTA) 100-62.5-25 mcg DsDv, Inhale 1 puff into the lungs once daily., Disp: 60 each, Rfl: 11    folic acid-vit B6-vit B12 2.5-25-2 mg (FOLBIC) 2.5-25-2 mg Tab, Take 1 tablet by mouth once daily., Disp: 90 tablet, Rfl: 3    furosemide (LASIX) 20 MG tablet, Take 1 tablet only as needed, for swelling, increase SOB, weight gain  of 3 lbs overnight or 5 lbs for the week, Disp: 24 tablet, Rfl: 3    gabapentin (NEURONTIN) 300 MG capsule, Take 1 capsule (300 mg total) by mouth every evening., Disp: 90 capsule, Rfl: 3    levothyroxine (SYNTHROID) 88 MCG tablet, Take 88 mcg by mouth once daily., Disp: , Rfl:     lidocaine (LIDODERM) 5 %, Place 1 patch onto the skin once daily. Remove & Discard patch within 12 hours or as directed by MD, Disp: 3 patch, Rfl: 0    magnesium oxide (MAG-OX) 400 mg (241.3 mg magnesium) tablet, TAKE 1 TABLET BY MOUTH ONCE DAILY, Disp: 90 tablet, Rfl: 3    montelukast (SINGULAIR) 10 mg tablet, TAKE 1 TABLET BY MOUTH ONCE DAILY IN THE EVENING, Disp: 90 tablet, Rfl: 3    multivitamin (THERAGRAN) tablet, Take 1 tablet by mouth once daily., Disp: , Rfl:     mupirocin (BACTROBAN) 2 % ointment, Apply topically 2 (two) times daily., Disp: 30 g, Rfl: 1    nitroGLYCERIN (NITROSTAT) 0.4 MG SL tablet, Place 1 tablet (0.4 mg total) under the tongue every 5 (five) minutes as needed for Chest pain. As needed (Patient taking differently: Place 0.4 mg under the tongue every 5 (five) minutes as needed for Chest pain. Seek medical help if pain is not relieved after the third dose.), Disp: 30 tablet, Rfl: 3    predniSONE (DELTASONE) 20 MG tablet, Take one daily for 3 days and repeat if needed., Disp: 12 tablet, Rfl: 0    sertraline (ZOLOFT) 25 MG tablet, Take 1 tablet (25 mg total) by mouth once daily., Disp: 90 tablet, Rfl: 3    simvastatin (ZOCOR) 40 MG tablet, TAKE 1 TABLET BY MOUTH ONCE DAILY IN THE EVENING, Disp: 90 tablet, Rfl: 3    vitamin D 1000 units Tab, Take 1 tablet (1,000 Units total) by mouth once daily., Disp: 90 tablet, Rfl: 3    Review of Systems   Constitutional: Negative for unexpected weight change.   HENT: Negative for trouble swallowing.    Eyes: Negative for visual disturbance.   Respiratory: Negative for shortness of breath.    Cardiovascular: Negative for chest pain, palpitations and leg swelling.  "  Gastrointestinal: Negative for blood in stool.   Genitourinary: Negative for hematuria.   Skin: Negative for rash.   Allergic/Immunologic: Negative for immunocompromised state.   Neurological: Negative for headaches.   Hematological: Does not bruise/bleed easily.   Psychiatric/Behavioral: Negative for agitation. The patient is not nervous/anxious.        Objective:      BP (!) 146/52 (BP Location: Left arm, Patient Position: Sitting, BP Method: Medium (Manual))   Pulse (!) 56   Temp 97.5 °F (36.4 °C) (Oral)   Ht 5' 4" (1.626 m)   Wt 56.2 kg (123 lb 14.4 oz)   LMP  (LMP Unknown)   SpO2 98%   BMI 21.27 kg/m²   Physical Exam   Constitutional: She is oriented to person, place, and time. She appears well-developed and well-nourished.   Elderly appearing   Eyes: Pupils are equal, round, and reactive to light. EOM are normal.   Neck: Normal range of motion.   Cardiovascular: Normal rate, regular rhythm and normal heart sounds.   Pulmonary/Chest: Effort normal and breath sounds normal.   Abdominal: Soft. Bowel sounds are normal.   Musculoskeletal: Normal range of motion.   Neurological: She is alert and oriented to person, place, and time.   Skin: Skin is warm and dry.   Psychiatric: She has a normal mood and affect. Her behavior is normal. Judgment and thought content normal.       Assessment:       1. Acute cystitis without hematuria    2. Hypertension associated with diabetes    3. Hyperlipidemia associated with type 2 diabetes mellitus    4. Type 2 diabetes mellitus with stage 3 chronic kidney disease, without long-term current use of insulin    5. COPD mixed type    6. Hypothyroidism, unspecified type    7. Hypertensive heart disease with heart failure    8. Moderate malnutrition        Plan:       Acute cystitis without hematuria  -     Urine culture; Future; Expected date: 02/20/2020    Hypertension associated with diabetes  Not in normal range  Low sodium diet  2 week BP log  BP Readings from Last 3 " Encounters:   02/20/20 (!) 146/52   02/16/20 (!) 158/70   02/14/20 (!) 140/64     Hyperlipidemia associated with type 2 diabetes mellitus  Stable condition.  Continue current medications.  Will adjust based on lab findings or if condition changes.     Type 2 diabetes mellitus with stage 3 chronic kidney disease, without long-term current use of insulin  Stable and chronic.  Will continue to monitor q3-6 months and control chronic conditions as optimally as possible to preserve function.  Hemoglobin A1C   Date Value Ref Range Status   12/14/2019 6.0 (H) 4.0 - 5.6 % Final     Comment:     ADA Screening Guidelines:  5.7-6.4%  Consistent with prediabetes  >or=6.5%  Consistent with diabetes  High levels of fetal hemoglobin interfere with the HbA1C  assay. Heterozygous hemoglobin variants (HbS, HgC, etc)do  not significantly interfere with this assay.   However, presence of multiple variants may affect accuracy.     03/22/2019 6.2 (H) 4.0 - 5.6 % Final     Comment:     ADA Screening Guidelines:  5.7-6.4%  Consistent with prediabetes  >or=6.5%  Consistent with diabetes  High levels of fetal hemoglobin interfere with the HbA1C  assay. Heterozygous hemoglobin variants (HbS, HgC, etc)do  not significantly interfere with this assay.   However, presence of multiple variants may affect accuracy.     10/03/2018 6.5 (H) 4.0 - 5.6 % Final     Comment:     ADA Screening Guidelines:  5.7-6.4%  Consistent with prediabetes  >or=6.5%  Consistent with diabetes  High levels of fetal hemoglobin interfere with the HbA1C  assay. Heterozygous hemoglobin variants (HbS, HgC, etc)do  not significantly interfere with this assay.   However, presence of multiple variants may affect accuracy.       COPD mixed type  Stable, continue resp inhaler  Hypothyroidism, unspecified type  Stable, continue medication  Hypertensive heart disease with heart failure  Stable, continue medication  Followed by Cardiology.[facility]  Moderate malnutrition  Ensure/boost 3  times daily        Patient readiness: acceptance and barriers:none    During the course of the visit the patient was educated and counseled about the following:     Diabetes:  Addressed ADA diet.  Encouraged aerobic exercise.  Hypertension:   Dietary sodium restriction.  Regular aerobic exercise.    Goals: Diabetes: Maintain Hemoglobin A1C below 7 and Obesity: Reduce calorie intake and BMI    Did patient meet goals/outcomes: No    The following self management tools provided: blood pressure log    Patient Instructions (the written plan) was given to the patient/family.     Time spent with patient: 30 minutes    Barriers to medications present (no )    Adverse reactions to current medications (no)    Over the counter medications reviewed (Yes)

## 2020-02-24 ENCOUNTER — LAB VISIT (OUTPATIENT)
Dept: LAB | Facility: HOSPITAL | Age: 85
End: 2020-02-24
Attending: INTERNAL MEDICINE
Payer: MEDICARE

## 2020-02-24 DIAGNOSIS — D64.9 ANEMIA, UNSPECIFIED TYPE: ICD-10-CM

## 2020-02-24 DIAGNOSIS — D63.8 ANEMIA OF CHRONIC DISEASE: ICD-10-CM

## 2020-02-24 DIAGNOSIS — D63.8 ANEMIA, CHRONIC DISEASE: ICD-10-CM

## 2020-02-24 DIAGNOSIS — D50.0 IRON DEFICIENCY ANEMIA DUE TO CHRONIC BLOOD LOSS: ICD-10-CM

## 2020-02-24 DIAGNOSIS — D64.9 NORMOCYTIC NORMOCHROMIC ANEMIA: ICD-10-CM

## 2020-02-24 DIAGNOSIS — N18.30 ANEMIA, CHRONIC RENAL FAILURE, STAGE 3 (MODERATE): ICD-10-CM

## 2020-02-24 DIAGNOSIS — D64.89 ANEMIA DUE TO MULTIPLE MECHANISMS: ICD-10-CM

## 2020-02-24 DIAGNOSIS — N30.00 ACUTE CYSTITIS WITHOUT HEMATURIA: ICD-10-CM

## 2020-02-24 DIAGNOSIS — D63.1 ANEMIA, CHRONIC RENAL FAILURE, STAGE 3 (MODERATE): ICD-10-CM

## 2020-02-24 DIAGNOSIS — D53.9 NUTRITIONAL ANEMIA, UNSPECIFIED: ICD-10-CM

## 2020-02-24 LAB
BASOPHILS # BLD AUTO: 0.05 K/UL (ref 0–0.2)
BASOPHILS NFR BLD: 0.6 % (ref 0–1.9)
DIFFERENTIAL METHOD: ABNORMAL
EOSINOPHIL # BLD AUTO: 0.3 K/UL (ref 0–0.5)
EOSINOPHIL NFR BLD: 3.5 % (ref 0–8)
ERYTHROCYTE [DISTWIDTH] IN BLOOD BY AUTOMATED COUNT: 13.5 % (ref 11.5–14.5)
HCT VFR BLD AUTO: 29.6 % (ref 37–48.5)
HGB BLD-MCNC: 9 G/DL (ref 12–16)
IMM GRANULOCYTES # BLD AUTO: 0.03 K/UL (ref 0–0.04)
IMM GRANULOCYTES NFR BLD AUTO: 0.4 % (ref 0–0.5)
LYMPHOCYTES # BLD AUTO: 1.8 K/UL (ref 1–4.8)
LYMPHOCYTES NFR BLD: 22.1 % (ref 18–48)
MCH RBC QN AUTO: 30.5 PG (ref 27–31)
MCHC RBC AUTO-ENTMCNC: 30.4 G/DL (ref 32–36)
MCV RBC AUTO: 100 FL (ref 82–98)
MONOCYTES # BLD AUTO: 1 K/UL (ref 0.3–1)
MONOCYTES NFR BLD: 11.8 % (ref 4–15)
NEUTROPHILS # BLD AUTO: 5 K/UL (ref 1.8–7.7)
NEUTROPHILS NFR BLD: 61.6 % (ref 38–73)
NRBC BLD-RTO: 0 /100 WBC
PLATELET # BLD AUTO: 171 K/UL (ref 150–350)
PMV BLD AUTO: 11.6 FL (ref 9.2–12.9)
RBC # BLD AUTO: 2.95 M/UL (ref 4–5.4)
WBC # BLD AUTO: 8.06 K/UL (ref 3.9–12.7)

## 2020-02-24 PROCEDURE — 80053 COMPREHEN METABOLIC PANEL: CPT

## 2020-02-24 PROCEDURE — 82728 ASSAY OF FERRITIN: CPT

## 2020-02-24 PROCEDURE — 82607 VITAMIN B-12: CPT

## 2020-02-24 PROCEDURE — 87077 CULTURE AEROBIC IDENTIFY: CPT | Mod: 59

## 2020-02-24 PROCEDURE — 83540 ASSAY OF IRON: CPT

## 2020-02-24 PROCEDURE — 87086 URINE CULTURE/COLONY COUNT: CPT

## 2020-02-24 PROCEDURE — 82746 ASSAY OF FOLIC ACID SERUM: CPT

## 2020-02-24 PROCEDURE — 85025 COMPLETE CBC W/AUTO DIFF WBC: CPT

## 2020-02-24 PROCEDURE — 87186 SC STD MICRODIL/AGAR DIL: CPT | Mod: 59

## 2020-02-24 PROCEDURE — 36415 COLL VENOUS BLD VENIPUNCTURE: CPT | Mod: PO

## 2020-02-24 PROCEDURE — 87088 URINE BACTERIA CULTURE: CPT

## 2020-02-25 LAB
ALBUMIN SERPL BCP-MCNC: 3.2 G/DL (ref 3.5–5.2)
ALP SERPL-CCNC: 74 U/L (ref 55–135)
ALT SERPL W/O P-5'-P-CCNC: 15 U/L (ref 10–44)
ANION GAP SERPL CALC-SCNC: 9 MMOL/L (ref 8–16)
AST SERPL-CCNC: 23 U/L (ref 10–40)
BILIRUB SERPL-MCNC: 0.2 MG/DL (ref 0.1–1)
BUN SERPL-MCNC: 41 MG/DL (ref 8–23)
CALCIUM SERPL-MCNC: 9.3 MG/DL (ref 8.7–10.5)
CHLORIDE SERPL-SCNC: 107 MMOL/L (ref 95–110)
CO2 SERPL-SCNC: 20 MMOL/L (ref 23–29)
CREAT SERPL-MCNC: 1.3 MG/DL (ref 0.5–1.4)
EST. GFR  (AFRICAN AMERICAN): 42 ML/MIN/1.73 M^2
EST. GFR  (NON AFRICAN AMERICAN): 36.5 ML/MIN/1.73 M^2
FERRITIN SERPL-MCNC: 153 NG/ML (ref 20–300)
FOLATE SERPL-MCNC: >40 NG/ML (ref 4–24)
GLUCOSE SERPL-MCNC: 67 MG/DL (ref 70–110)
IRON SERPL-MCNC: 27 UG/DL (ref 30–160)
POTASSIUM SERPL-SCNC: 4.2 MMOL/L (ref 3.5–5.1)
PROT SERPL-MCNC: 6.9 G/DL (ref 6–8.4)
SATURATED IRON: 10 % (ref 20–50)
SODIUM SERPL-SCNC: 136 MMOL/L (ref 136–145)
TOTAL IRON BINDING CAPACITY: 284 UG/DL (ref 250–450)
TRANSFERRIN SERPL-MCNC: 192 MG/DL (ref 200–375)
VIT B12 SERPL-MCNC: >2000 PG/ML (ref 210–950)

## 2020-02-28 DIAGNOSIS — N30.00 ACUTE CYSTITIS WITHOUT HEMATURIA: Primary | ICD-10-CM

## 2020-02-28 LAB — BACTERIA UR CULT: ABNORMAL

## 2020-02-28 RX ORDER — CIPROFLOXACIN 250 MG/1
250 TABLET, FILM COATED ORAL 2 TIMES DAILY
Qty: 14 TABLET | Refills: 0 | Status: SHIPPED | OUTPATIENT
Start: 2020-02-28 | End: 2020-03-06

## 2020-03-02 ENCOUNTER — TELEPHONE (OUTPATIENT)
Dept: FAMILY MEDICINE | Facility: CLINIC | Age: 85
End: 2020-03-02

## 2020-03-02 NOTE — TELEPHONE ENCOUNTER
----- Message from Adelaide Eddy sent at 3/2/2020  8:32 AM CST -----  Contact: Patient  Patient called to state that she picked up the antibiotic she was prescribed but after looking it up, she has reservations about this due to her kidney issues.    Medication-   ciprofloxacin HCl (CIPRO) 250 MG tablet    Please call Ms. Rudd at: [phone]

## 2020-03-02 NOTE — TELEPHONE ENCOUNTER
The urine culture grew bacteria that is treated with cipro. I'm aware of the patients kidney issues, that why a lowered the dosage of the medication.

## 2020-03-02 NOTE — TELEPHONE ENCOUNTER
Pt informed of additional info and states that she feels better and will start taking the meds today

## 2020-03-11 ENCOUNTER — APPOINTMENT (OUTPATIENT)
Dept: LAB | Facility: HOSPITAL | Age: 85
End: 2020-03-11
Attending: INTERNAL MEDICINE
Payer: MEDICARE

## 2020-03-28 ENCOUNTER — HOSPITAL ENCOUNTER (EMERGENCY)
Facility: HOSPITAL | Age: 85
Discharge: HOME | End: 2020-03-28
Attending: EMERGENCY MEDICINE
Payer: MEDICARE

## 2020-03-28 VITALS
DIASTOLIC BLOOD PRESSURE: 83 MMHG | OXYGEN SATURATION: 98 % | HEIGHT: 64 IN | WEIGHT: 123 LBS | BODY MASS INDEX: 21 KG/M2 | HEART RATE: 60 BPM | TEMPERATURE: 98 F | SYSTOLIC BLOOD PRESSURE: 198 MMHG | RESPIRATION RATE: 20 BRPM

## 2020-03-28 DIAGNOSIS — N39.0 URINARY TRACT INFECTION WITHOUT HEMATURIA, SITE UNSPECIFIED: ICD-10-CM

## 2020-03-28 DIAGNOSIS — R55 SYNCOPE: ICD-10-CM

## 2020-03-28 DIAGNOSIS — R55 SYNCOPE, UNSPECIFIED SYNCOPE TYPE: Primary | ICD-10-CM

## 2020-03-28 LAB
ALBUMIN SERPL BCP-MCNC: 3.6 G/DL (ref 3.5–5.2)
ALP SERPL-CCNC: 90 U/L (ref 55–135)
ALT SERPL W/O P-5'-P-CCNC: 22 U/L (ref 10–44)
ANION GAP SERPL CALC-SCNC: 11 MMOL/L (ref 8–16)
AST SERPL-CCNC: 25 U/L (ref 10–40)
BACTERIA #/AREA URNS HPF: ABNORMAL /HPF
BASOPHILS # BLD AUTO: 0.03 K/UL (ref 0–0.2)
BASOPHILS NFR BLD: 0.3 % (ref 0–1.9)
BILIRUB SERPL-MCNC: 0.2 MG/DL (ref 0.1–1)
BILIRUB UR QL STRIP: NEGATIVE
BUN SERPL-MCNC: 79 MG/DL (ref 8–23)
CALCIUM SERPL-MCNC: 9.7 MG/DL (ref 8.7–10.5)
CHLORIDE SERPL-SCNC: 98 MMOL/L (ref 95–110)
CLARITY UR: CLEAR
CO2 SERPL-SCNC: 27 MMOL/L (ref 23–29)
COLOR UR: YELLOW
CREAT SERPL-MCNC: 1.9 MG/DL (ref 0.5–1.4)
DIFFERENTIAL METHOD: ABNORMAL
EOSINOPHIL # BLD AUTO: 0.1 K/UL (ref 0–0.5)
EOSINOPHIL NFR BLD: 1 % (ref 0–8)
ERYTHROCYTE [DISTWIDTH] IN BLOOD BY AUTOMATED COUNT: 13.2 % (ref 11.5–14.5)
EST. GFR  (AFRICAN AMERICAN): 27 ML/MIN/1.73 M^2
EST. GFR  (NON AFRICAN AMERICAN): 23 ML/MIN/1.73 M^2
GLUCOSE SERPL-MCNC: 136 MG/DL (ref 70–110)
GLUCOSE UR QL STRIP: NEGATIVE
HCT VFR BLD AUTO: 33 % (ref 37–48.5)
HGB BLD-MCNC: 10.2 G/DL (ref 12–16)
HGB UR QL STRIP: NEGATIVE
HYALINE CASTS #/AREA URNS LPF: 0 /LPF
IMM GRANULOCYTES # BLD AUTO: 0.1 K/UL (ref 0–0.04)
IMM GRANULOCYTES NFR BLD AUTO: 1 % (ref 0–0.5)
KETONES UR QL STRIP: NEGATIVE
LEUKOCYTE ESTERASE UR QL STRIP: ABNORMAL
LYMPHOCYTES # BLD AUTO: 1.9 K/UL (ref 1–4.8)
LYMPHOCYTES NFR BLD: 19.2 % (ref 18–48)
MCH RBC QN AUTO: 29.8 PG (ref 27–31)
MCHC RBC AUTO-ENTMCNC: 30.9 G/DL (ref 32–36)
MCV RBC AUTO: 97 FL (ref 82–98)
MICROSCOPIC COMMENT: ABNORMAL
MONOCYTES # BLD AUTO: 1 K/UL (ref 0.3–1)
MONOCYTES NFR BLD: 10.5 % (ref 4–15)
NEUTROPHILS # BLD AUTO: 6.6 K/UL (ref 1.8–7.7)
NEUTROPHILS NFR BLD: 68 % (ref 38–73)
NITRITE UR QL STRIP: NEGATIVE
NRBC BLD-RTO: 0 /100 WBC
PH UR STRIP: 6 [PH] (ref 5–8)
PLATELET # BLD AUTO: 187 K/UL (ref 150–350)
PMV BLD AUTO: 11 FL (ref 9.2–12.9)
POTASSIUM SERPL-SCNC: 4 MMOL/L (ref 3.5–5.1)
PROT SERPL-MCNC: 6.8 G/DL (ref 6–8.4)
PROT UR QL STRIP: ABNORMAL
RBC # BLD AUTO: 3.42 M/UL (ref 4–5.4)
RBC #/AREA URNS HPF: 1 /HPF (ref 0–4)
SODIUM SERPL-SCNC: 136 MMOL/L (ref 136–145)
SP GR UR STRIP: 1.01 (ref 1–1.03)
TROPONIN I SERPL DL<=0.01 NG/ML-MCNC: <0.006 NG/ML (ref 0–0.03)
URN SPEC COLLECT METH UR: ABNORMAL
UROBILINOGEN UR STRIP-ACNC: NEGATIVE EU/DL
WBC # BLD AUTO: 9.64 K/UL (ref 3.9–12.7)
WBC #/AREA URNS HPF: 15 /HPF (ref 0–5)

## 2020-03-28 PROCEDURE — 99285 EMERGENCY DEPT VISIT HI MDM: CPT | Mod: 25

## 2020-03-28 PROCEDURE — 63600175 PHARM REV CODE 636 W HCPCS: Performed by: EMERGENCY MEDICINE

## 2020-03-28 PROCEDURE — 87086 URINE CULTURE/COLONY COUNT: CPT

## 2020-03-28 PROCEDURE — 93005 ELECTROCARDIOGRAM TRACING: CPT

## 2020-03-28 PROCEDURE — 85025 COMPLETE CBC W/AUTO DIFF WBC: CPT

## 2020-03-28 PROCEDURE — 81000 URINALYSIS NONAUTO W/SCOPE: CPT

## 2020-03-28 PROCEDURE — 36415 COLL VENOUS BLD VENIPUNCTURE: CPT

## 2020-03-28 PROCEDURE — 80053 COMPREHEN METABOLIC PANEL: CPT

## 2020-03-28 PROCEDURE — 84484 ASSAY OF TROPONIN QUANT: CPT

## 2020-03-28 PROCEDURE — 96365 THER/PROPH/DIAG IV INF INIT: CPT

## 2020-03-28 RX ORDER — NITROFURANTOIN 25; 75 MG/1; MG/1
100 CAPSULE ORAL 2 TIMES DAILY
Qty: 14 CAPSULE | Refills: 0 | Status: SHIPPED | OUTPATIENT
Start: 2020-03-28 | End: 2020-04-04

## 2020-03-28 RX ADMIN — CEFTRIAXONE 1 G: 1 INJECTION, SOLUTION INTRAVENOUS at 09:03

## 2020-03-28 NOTE — ED NOTES
Assumed care:  Blaire Cage is awake, alert and oriented x 3, skin warm and dry, in NAD.  Patient states that she was at home getting something to eat from the kitchen and got dizzy.  States that she was on the floor but does not know how she got there.  Denies HA or pain.  Denies injury.  States that her daughter took her BP and was low.  States glucose was 92.  CO urinary frequency and pain with urination.    Patient identifiers for Blaire Cage checked and correct.  LOC:  Blaire Cage is awake, alert, and aware of environment with an appropriate affect. She is oriented x 3 and speaking appropriately.  APPEARANCE:  She is resting comfortably and in no acute distress. She is clean and well groomed, patient's clothing is properly fastened.  SKIN:  The skin is warm and dry. She has normal skin turgor and moist mucus membranes. ecchymotic.  MUSCULOSKELETAL:  She is moving all extremities well, no obvious deformities noted. Pulses intact.   RESPIRATORY:  Airway is open and patent. Respirations are spontaneous and non-labored with normal effort and rate.  CARDIAC:  She has a normal rate and rhythm. No peripheral edema noted. Capillary refill < 3 seconds.  ABDOMEN:  No distention noted.  Soft and non-tender upon palpation.  NEUROLOGICAL:  PERRL. Facial expression is symmetrical. Hand grasps are equal bilaterally. Normal sensation in all extremities when touched with finger.  dizzy  Allergies reported:    Review of patient's allergies indicates:   Allergen Reactions    Carvedilol Other (See Comments)     Bradycardia and syncope    Boniva [ibandronate]     Codeine     Hydralazine analogues     Iodinated contrast media Hives    Kionex [sodium polystyrene sulfonate] Diarrhea     From encounter 12/11/17 for diarrhea--not true allergy.    Lisinopril     Morphine      OTHER NOTES:

## 2020-03-28 NOTE — ED PROVIDER NOTES
"Encounter Date: 3/28/2020    SCRIBE #1 NOTE: IArben , am scribing for, and in the presence of, Dr. Loredo.       History     Chief Complaint   Patient presents with    Loss of Consciousness     " passed out " at home / denies injury        Time seen by provider: 6:36 PM on 03/28/2020    Blaire Cage is a 89 y.o. female who presents to the ED after a loss of consciousness at home pta. The patient reports that as she was fixing something for lunch, she passed out after opening the refrigerator in her wheelchair. Patient states she lives alone with daily visits from family members. She denies any injuries, HA, nausea, vomiting, blood in stool, or sick contacts. She does note to having discomfort in her vaginal area. PMHx includes HTN, HLD, DM, COPD, anemia, and CHF. SHx includes a hysterectomy, appendectomy, cholecystectomy, and a hernia repair. Drug allergies of Carvedilol, Boniva, Codeine, Hydralazine, Lisinopril, and Morphine noted.       The history is provided by the patient.     Review of patient's allergies indicates:   Allergen Reactions    Carvedilol Other (See Comments)     Bradycardia and syncope    Boniva [ibandronate]     Codeine     Hydralazine analogues     Iodinated contrast media Hives    Kionex [sodium polystyrene sulfonate] Diarrhea     From encounter 12/11/17 for diarrhea--not true allergy.   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 8/7/2018    Hip arthritis " 3/1/2016    Homocysteinemia 2018    Hyperlipidemia     Hypertension     Normocytic normochromic anemia 2018    PE (pulmonary thromboembolism) 2018    Pneumonia of right lower lobe due to infectious organism 2017    Thyroid disease     hypothyroid    Type 2 diabetes mellitus with stage 3 chronic kidney disease 2013     Past Surgical History:   Procedure Laterality Date    APPENDECTOMY      CHOLECYSTECTOMY      FRACTURE SURGERY      right hip     HERNIA REPAIR      groin    HYSTERECTOMY      INSERTION OF IMPLANTABLE LOOP RECORDER N/A 2018    Procedure: Insertion, Implantable Loop Recorder;  Surgeon: Ashok Herbert MD;  Location: [facility] CATH LAB;  Service: Cardiology;  Laterality: N/A;    PERCUTANEOUS PINNING OF HIP Left 3/23/2019    Procedure: PINNING, HIP, PERCUTANEOUS;  Surgeon: Jorje Hamlin MD;  Location: [facility] OR;  Service: Orthopedics;  Laterality: Left;    VARICOSE VEIN SURGERY       Family History   Problem Relation Age of Onset    Hypertension Mother     Heart disease Mother     Lung disease Father     Diabetes Sister     Diabetes Brother     Kidney disease Son      Social History     Tobacco Use    Smoking status: Former Smoker     Packs/day: 0.35     Years: 44.00     Pack years: 15.40     Types: Cigarettes     Last attempt to quit: 2015     Years since quittin.9    Smokeless tobacco: Never Used    Tobacco comment: 2015   Substance Use Topics    Alcohol use: No     Alcohol/week: 0.0 standard drinks     Frequency: Never     Binge frequency: Never    Drug use: No     Review of Systems   Constitutional: Negative for activity change, appetite change, chills, fatigue and fever.   Eyes: Negative for visual disturbance.   Respiratory: Negative for apnea and shortness of breath.    Cardiovascular: Negative for chest pain and palpitations.   Gastrointestinal: Negative for abdominal distention, abdominal pain, blood in stool, nausea and  vomiting.   Genitourinary: Negative for difficulty urinating.        Vaginal discomfort   Musculoskeletal: Negative for neck pain.   Skin: Negative for pallor and rash.   Neurological: Positive for syncope. Negative for headaches.   Hematological: Does not bruise/bleed easily.   Psychiatric/Behavioral: Negative for agitation.       Physical Exam     Initial Vitals   BP Pulse Resp Temp SpO2   03/28/20 1749 03/28/20 1749 03/28/20 1749 03/28/20 1749 03/28/20 1752   (!) 212/86 61 20 97.7 °F (36.5 °C) 98 %      MAP       --                Physical Exam    Nursing note and vitals reviewed.  Constitutional: She appears well-developed and well-nourished.   HENT:   Head: Normocephalic and atraumatic.   Eyes: Conjunctivae are normal.   Neck: Normal range of motion. Neck supple.   Cardiovascular: Normal rate, regular rhythm and normal heart sounds. Exam reveals no gallop and no friction rub.    No murmur heard.  Pulmonary/Chest: Effort normal and breath sounds normal. No respiratory distress. She has no wheezes. She has no rhonchi. She has no rales.   Abdominal: Soft. She exhibits no distension. There is no tenderness.   Musculoskeletal: Normal range of motion.   Neurological: She is alert and oriented to person, place, and time.   Skin: Skin is warm and dry. No erythema.   Psychiatric: She has a normal mood and affect.         ED Course   Procedures  Labs Reviewed   CBC W/ AUTO DIFFERENTIAL   COMPREHENSIVE METABOLIC PANEL   TROPONIN I   URINALYSIS, REFLEX TO URINE CULTURE          Imaging Results    None          Medical Decision Making:   ED Management:  89-year-old female presents after a syncopal episode.  She denies any suggestive symptoms i.e. chest pain, headache, neck stiffness or shortness of breath, that preceded the syncopal episode.  She has been asymptomatic since a syncopal episode.  EKG is normal with no evidence of ischemia/MI.  Troponin is negative with MI less likely.  I discussed the risks versus benefits of  admission.  Due to the pandemic, and her age, the risk of COVID-19 infection outweighs the benefit of workup for syncope.  She is discharged into the care of her daughter and is encouraged to return immediately for an additional syncopal episode, chest pain or shortness of breath.  She is found to have a UTI which most likely is a coincidental finding.  She is given a dose of Rocephin in the emergency department and discharged with oral nitrofurantoin.       APC / Resident Notes:   I, Dr. Alberto Loredo III, personally performed the services described in this documentation. All medical record entries made by the scribe were at my direction and in my presence.  I have reviewed the chart and agree that the record reflects my personal performance and is accurate and complete       Scribe Attestation:   Scribe #1: I performed the above scribed service and the documentation accurately describes the services I performed. I attest to the accuracy of the note.                          Clinical Impression:       ICD-10-CM ICD-9-CM   1. Syncope R55 780.2   2. Syncope R55 780.2         Disposition:   Disposition: Discharged  Condition: Stable                        Alberto Loredo III, MD  03/28/20 0418

## 2020-03-30 LAB
BACTERIA UR CULT: NORMAL
BACTERIA UR CULT: NORMAL

## 2020-04-03 DIAGNOSIS — J44.1 COPD EXACERBATION: ICD-10-CM

## 2020-04-03 DIAGNOSIS — J44.89 ASTHMA-COPD OVERLAP SYNDROME: ICD-10-CM

## 2020-04-03 DIAGNOSIS — J44.9 COPD MIXED TYPE: ICD-10-CM

## 2020-04-03 RX ORDER — PREDNISONE 20 MG/1
TABLET ORAL
Qty: 12 TABLET | Refills: 0 | Status: ON HOLD | OUTPATIENT
Start: 2020-04-03 | End: 2020-05-09 | Stop reason: HOSPADM

## 2020-04-03 NOTE — TELEPHONE ENCOUNTER
Explained to patient to check the pharmacy later today.      ----- Message from Priti Pierre sent at 4/3/2020  1:41 PM CDT -----  Contact: Patient   Type: RX Refill Request    Who Called: Patient     Have you contacted your pharmacy: no     Refill or New Rx: Refill     RX Name and Strength: predniSONE (DELTASONE) 20 MG tablet    How is the patient currently taking it? (ex. 1XDay):     Is this a 30 day or 90 day RX:    Preferred Pharmacy with phone number:   [id number] - [place], [place] - [address] [place] [address]  Phone: [phone] Fax: [phone]        Local or Mail Order: Local     Ordering Provider: Dr. Christopher     Would the patient rather a call back or a response via [facility]? Call back     Best Call Back Number: [phone]

## 2020-04-06 ENCOUNTER — TELEPHONE (OUTPATIENT)
Dept: FAMILY MEDICINE | Facility: CLINIC | Age: 85
End: 2020-04-06

## 2020-04-06 DIAGNOSIS — M25.512 ACUTE PAIN OF LEFT SHOULDER: Primary | ICD-10-CM

## 2020-04-06 NOTE — TELEPHONE ENCOUNTER
----- Message from Ellie Jerry MA sent at 4/3/2020  4:34 PM CDT -----  Contact: Latondra  Patient fell and was seen in the ER no Xray performed. She is having a lot of pain.  The family does not feel that she should go back the ER due to exposure.  Please call to advise    Please return call: [phone]

## 2020-04-06 NOTE — TELEPHONE ENCOUNTER
Spoke to Laura - patient has appt with Dr. Edmonds 4/8- she is asking if Dr. Edmonds will order left shoulder xray for patient. She is having pain in shoulder since the fall when she passed out.

## 2020-04-08 ENCOUNTER — OFFICE VISIT (OUTPATIENT)
Dept: FAMILY MEDICINE | Facility: CLINIC | Age: 85
End: 2020-04-08
Payer: MEDICARE

## 2020-04-08 ENCOUNTER — HOSPITAL ENCOUNTER (OUTPATIENT)
Dept: RADIOLOGY | Facility: CLINIC | Age: 85
Discharge: HOME | End: 2020-04-08
Attending: FAMILY MEDICINE
Payer: MEDICARE

## 2020-04-08 VITALS
SYSTOLIC BLOOD PRESSURE: 164 MMHG | DIASTOLIC BLOOD PRESSURE: 60 MMHG | HEART RATE: 71 BPM | WEIGHT: 123 LBS | BODY MASS INDEX: 21 KG/M2 | TEMPERATURE: 98 F | OXYGEN SATURATION: 95 % | HEIGHT: 64 IN

## 2020-04-08 DIAGNOSIS — R39.9 URINARY SYMPTOM OR SIGN: ICD-10-CM

## 2020-04-08 DIAGNOSIS — N89.8 VAGINAL DISCHARGE: ICD-10-CM

## 2020-04-08 DIAGNOSIS — M25.519 ACUTE SHOULDER PAIN: ICD-10-CM

## 2020-04-08 DIAGNOSIS — I15.2 HYPERTENSION ASSOCIATED WITH DIABETES: ICD-10-CM

## 2020-04-08 DIAGNOSIS — E11.40 TYPE 2 DIABETES MELLITUS WITH DIABETIC NEUROPATHY, WITHOUT LONG-TERM CURRENT USE OF INSULIN: Primary | ICD-10-CM

## 2020-04-08 DIAGNOSIS — E11.59 HYPERTENSION ASSOCIATED WITH DIABETES: ICD-10-CM

## 2020-04-08 DIAGNOSIS — M25.512 ACUTE PAIN OF LEFT SHOULDER: ICD-10-CM

## 2020-04-08 DIAGNOSIS — E03.9 HYPOTHYROIDISM, UNSPECIFIED TYPE: ICD-10-CM

## 2020-04-08 LAB
BILIRUB SERPL-MCNC: ABNORMAL MG/DL
BLOOD URINE, POC: ABNORMAL
COLOR, POC UA: ABNORMAL
GLUCOSE UR QL STRIP: ABNORMAL
KETONES UR QL STRIP: ABNORMAL
LEUKOCYTE ESTERASE URINE, POC: ABNORMAL
NITRITE, POC UA: ABNORMAL
PH, POC UA: 5
PROTEIN, POC: ABNORMAL
SPECIFIC GRAVITY, POC UA: 1
UROBILINOGEN, POC UA: ABNORMAL

## 2020-04-08 PROCEDURE — 73030 XR SHOULDER COMPLETE 2 OR MORE VIEWS BILATERAL: ICD-10-PCS | Mod: 26,50,S$GLB, | Performed by: RADIOLOGY

## 2020-04-08 PROCEDURE — 73030 X-RAY EXAM OF SHOULDER: CPT | Mod: TC,50,FY,PO

## 2020-04-08 PROCEDURE — 1101F PR PT FALLS ASSESS DOC 0-1 FALLS W/OUT INJ PAST YR: ICD-10-PCS | Mod: CPTII,S$GLB,, | Performed by: NURSE PRACTITIONER

## 2020-04-08 PROCEDURE — 99999 PR PBB SHADOW E&M-EST. PATIENT-LVL III: ICD-10-PCS | Mod: PBBFAC,,, | Performed by: NURSE PRACTITIONER

## 2020-04-08 PROCEDURE — 1101F PT FALLS ASSESS-DOCD LE1/YR: CPT | Mod: CPTII,S$GLB,, | Performed by: NURSE PRACTITIONER

## 2020-04-08 PROCEDURE — 73030 X-RAY EXAM OF SHOULDER: CPT | Mod: 26,50,S$GLB, | Performed by: RADIOLOGY

## 2020-04-08 PROCEDURE — 1125F AMNT PAIN NOTED PAIN PRSNT: CPT | Mod: S$GLB,,, | Performed by: NURSE PRACTITIONER

## 2020-04-08 PROCEDURE — 1159F PR MEDICATION LIST DOCUMENTED IN MEDICAL RECORD: ICD-10-PCS | Mod: S$GLB,,, | Performed by: NURSE PRACTITIONER

## 2020-04-08 PROCEDURE — 87801 DETECT AGNT MULT DNA AMPLI: CPT

## 2020-04-08 PROCEDURE — 81002 URINALYSIS NONAUTO W/O SCOPE: CPT | Mod: S$GLB,,, | Performed by: NURSE PRACTITIONER

## 2020-04-08 PROCEDURE — 99214 OFFICE O/P EST MOD 30 MIN: CPT | Mod: 25,S$GLB,, | Performed by: NURSE PRACTITIONER

## 2020-04-08 PROCEDURE — 87661 TRICHOMONAS VAGINALIS AMPLIF: CPT

## 2020-04-08 PROCEDURE — 1159F MED LIST DOCD IN RCRD: CPT | Mod: S$GLB,,, | Performed by: NURSE PRACTITIONER

## 2020-04-08 PROCEDURE — 1125F PR PAIN SEVERITY QUANTIFIED, PAIN PRESENT: ICD-10-PCS | Mod: S$GLB,,, | Performed by: NURSE PRACTITIONER

## 2020-04-08 PROCEDURE — 87086 URINE CULTURE/COLONY COUNT: CPT

## 2020-04-08 PROCEDURE — 81002 POCT URINE DIPSTICK WITHOUT MICROSCOPE: ICD-10-PCS | Mod: S$GLB,,, | Performed by: NURSE PRACTITIONER

## 2020-04-08 PROCEDURE — 99999 PR PBB SHADOW E&M-EST. PATIENT-LVL III: CPT | Mod: PBBFAC,,, | Performed by: NURSE PRACTITIONER

## 2020-04-08 PROCEDURE — 87481 CANDIDA DNA AMP PROBE: CPT | Mod: 59

## 2020-04-08 PROCEDURE — 99214 PR OFFICE/OUTPT VISIT, EST, LEVL IV, 30-39 MIN: ICD-10-PCS | Mod: 25,S$GLB,, | Performed by: NURSE PRACTITIONER

## 2020-04-08 RX ORDER — AMOXICILLIN AND CLAVULANATE POTASSIUM 250; 125 MG/1; MG/1
1 TABLET, FILM COATED ORAL EVERY 12 HOURS
Qty: 10 TABLET | Refills: 0 | Status: SHIPPED | OUTPATIENT
Start: 2020-04-08 | End: 2020-04-13

## 2020-04-08 RX ORDER — AMLODIPINE BESYLATE 5 MG/1
2.5 TABLET ORAL DAILY
Qty: 15 TABLET | Refills: 11
Start: 2020-04-08 | End: 2020-04-28 | Stop reason: DRUGHIGH

## 2020-04-08 NOTE — PROGRESS NOTES
"Subjective:       Patient ID: Blaire Cage is a 89 y.o. female.    Chief Complaint: No chief complaint on file.    HPI   Ms. Cage is an 89 yo female who presents today for follow up.  She was seen in the Ed 3/28/2020 after having a syncopal episode at home.  She reported that as she was fixing something for lunch, she passed out after opening the refrigerator in her wheelchair. Workup in the ED was negative aside from a UTI for which she was treated.  Today she reports that although she completed antibiotics she still is having some dysuria and vaginal discomfort and "itching".  She has not noticed a discharge.  She states that her blood sugar has been well controlled with most readings in the high 90s-100's.  She denies any symptoms of hypo/hyperglycermia.  She does have concern for her blood pressure.  She states that she checks the blood pressure twice daily and most of the readings are around 140/90.  She takes her blood pressure medication daily in the morning.  She denies headache, chest pain ,SOB.    Vitals:    04/08/20 1416   BP: (!) 164/60   Pulse: 71   Temp: 98.2 °F (36.8 °C)     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 8/7/2018    Hip arthritis 3/1/2016    Homocysteinemia 8/7/2018    Hyperlipidemia     Hypertension     Normocytic normochromic anemia 6/29/2018    PE (pulmonary thromboembolism) 06/09/2018    Pneumonia of right lower lobe due to infectious organism 9/11/2017    Thyroid disease     hypothyroid "    Type 2 diabetes mellitus with stage 3 chronic kidney disease 1/18/2013     Review of Systems   Constitutional: Negative for chills, fatigue, fever and unexpected weight change.   HENT: Negative for congestion, hearing loss, nosebleeds, postnasal drip, sinus pressure, sinus pain and sore throat.    Eyes: Negative for pain, discharge, redness and visual disturbance.   Respiratory: Negative for cough, chest tightness and shortness of breath.    Cardiovascular: Negative for chest pain and palpitations.   Gastrointestinal: Negative for abdominal pain, constipation, diarrhea, nausea and vomiting.   Endocrine: Negative for cold intolerance and heat intolerance.   Genitourinary: Positive for dysuria and vaginal pain (irritation).   Musculoskeletal: Negative for back pain.   Neurological: Negative for dizziness, syncope, light-headedness and headaches.   Psychiatric/Behavioral: Negative for agitation and dysphoric mood. The patient is not nervous/anxious.        Objective:      Physical Exam   Constitutional: She is oriented to person, place, and time. She appears well-developed and well-nourished.   HENT:   Head: Normocephalic and atraumatic.   Eyes: Pupils are equal, round, and reactive to light. Conjunctivae are normal. Right eye exhibits no discharge. Left eye exhibits no discharge.   Neck: Normal range of motion. Neck supple. No thyromegaly present.   Cardiovascular: Normal rate, regular rhythm, normal heart sounds and intact distal pulses.   Pulmonary/Chest: Breath sounds normal. No respiratory distress. She has no wheezes.   Abdominal: Soft. Bowel sounds are normal. She exhibits no distension. There is no tenderness. There is no rebound.   Genitourinary: There is no rash, tenderness, lesion or injury on the right labia. There is no rash, tenderness, lesion or injury on the left labia. No erythema in the vagina. No vaginal discharge found.   Musculoskeletal: Normal range of motion. She exhibits no edema or  deformity.   Lymphadenopathy:     She has no cervical adenopathy.   Neurological: She is alert and oriented to person, place, and time. No cranial nerve deficit or sensory deficit.   Skin: Skin is warm and dry. No rash noted.   Psychiatric: She has a normal mood and affect.       Assessment & Plan:       Type 2 diabetes mellitus with diabetic neuropathy, without long-term current use of insulin  -     CBC auto differential; Future; Expected date: 04/08/2020  -     Comprehensive metabolic panel; Future; Expected date: 04/08/2020  -     Hemoglobin A1c; Future; Expected date: 04/08/2020        -     Controlled, continue current medication regimen        -     Will follow up with results of above when received   Hypertension associated with diabetes  -     Comprehensive metabolic panel; Future; Expected date: 04/08/2020  -     Discontinue: amLODIPine (NORVASC) 5 MG tablet; Take 0.5 tablets (2.5 mg total) by mouth once daily.  Dispense: 15 tablet; Refill: 11        -     Increase dose of amlodipine to 7.5 mg daily        -     Monitor blood pressure and record         -     Return record in one week - through portal  Hypothyroidism, unspecified type  -     TSH; Future; Expected date: 04/08/2020  -     T4, free; Future; Expected date: 04/08/2020        -     Stable, continue current medication regimen        -     Will follow up with results of the above when received   Urinary symptom or sign  -     POCT URINE DIPSTICK WITHOUT MICROSCOPE  -     Urine culture  -     amoxicillin-clavulanate 250-125mg (AUGMENTIN) 250-125 mg Tab; Take 1 tablet by mouth every 12 (twelve) hours. for 5 days  Dispense: 10 tablet; Refill: 0        -Patient was counseled to increase fluid intake.  Avoid carbonated beverages.  Empty your bladder frequently, before and after intercourse, and wipe front to back when cleaning after using the bathroom.  Cranberry juice intake may help.  Notify MD if you have fever > 100.4, severe abdominal pain, blood  in urine or if you see no improvement in 3 days.    Vaginal discharge  -     VAGINOSIS SCREEN BY DNA PROBE        -     Suspect possible yeast infection        -     Will treat accordingly when results received         Follow up in about 3 months (around 7/8/2020).

## 2020-04-08 NOTE — PATIENT INSTRUCTIONS
Increase dose of amlodipine : 1tab in the morning (5mg) , 1/2 tab in the evening (2.5mg)    Take blood pressure twice daily and record     Drop logs off in 10 days to provider     Will call with results of culture  Start taking antibiotics as directed    increase fluid intake.  Avoid carbonated beverages.  Empty your bladder frequently, before and after intercourse, and wipe front to back when cleaning after using the bathroom.  Cranberry juice intake may help.  Notify MD if you have fever > 100.4, severe abdominal pain, blood in urine or if you see no improvement in 3 days.

## 2020-04-10 LAB
BACTERIA UR CULT: NORMAL
BACTERIA UR CULT: NORMAL

## 2020-04-13 ENCOUNTER — TELEPHONE (OUTPATIENT)
Dept: FAMILY MEDICINE | Facility: CLINIC | Age: 85
End: 2020-04-13

## 2020-04-13 DIAGNOSIS — B37.31 VAGINAL YEAST INFECTION: Primary | ICD-10-CM

## 2020-04-13 LAB
BACTERIAL VAGINOSIS DNA: NEGATIVE
CANDIDA GLABRATA DNA: NEGATIVE
CANDIDA KRUSEI DNA: NEGATIVE
CANDIDA RRNA VAG QL PROBE: POSITIVE
T VAGINALIS RRNA GENITAL QL PROBE: NEGATIVE

## 2020-04-13 RX ORDER — FLUCONAZOLE 150 MG/1
150 TABLET ORAL DAILY
Qty: 1 TABLET | Refills: 0 | Status: SHIPPED | OUTPATIENT
Start: 2020-04-13 | End: 2020-04-14

## 2020-04-13 NOTE — TELEPHONE ENCOUNTER
Please advise.       ----- Message from Ainsley Bowie sent at 4/10/2020  9:39 AM CDT -----  Pt called blood pressure still running high please advise pt    Pt can be reached at [phone]

## 2020-04-14 RX ORDER — SIMVASTATIN 40 MG/1
TABLET, FILM COATED ORAL
Qty: 90 TABLET | Refills: 3 | Status: SHIPPED | OUTPATIENT
Start: 2020-04-14 | End: 2021-04-24

## 2020-04-14 RX ORDER — MONTELUKAST SODIUM 10 MG/1
TABLET ORAL
Qty: 90 TABLET | Refills: 1 | Status: SHIPPED | OUTPATIENT
Start: 2020-04-14 | End: 2021-04-22

## 2020-04-23 NOTE — TELEPHONE ENCOUNTER
----- Message from Aura Treadwell sent at 4/23/2020  1:08 PM CDT -----  Her phone was out of order, she just got it fixed and saw the missed calls.  Please call her again at [phone].  Thank you!

## 2020-04-24 NOTE — TELEPHONE ENCOUNTER
Can we clarify with patient when these readings were taken in relation to her blood pressure medication

## 2020-04-24 NOTE — TELEPHONE ENCOUNTER
Pt stated her BP is low in the morning and it comes up in the afternoon. Pt has been taking amlodipine 5mg, 1 tab Q AM and 1/2 tab Q PM since the 8th. BP was taken last 4 days.

## 2020-04-26 NOTE — TELEPHONE ENCOUNTER
Please have patient increase amlodipine to 5mg in the AM and 5 MG IN THE pm.  Continue to monitor blood pressure and send updated readings Wednesday.

## 2020-04-28 ENCOUNTER — CLINICAL SUPPORT (OUTPATIENT)
Dept: CARDIOLOGY | Facility: CLINIC | Age: 85
End: 2020-04-28
Payer: MEDICARE

## 2020-04-28 ENCOUNTER — TELEPHONE (OUTPATIENT)
Dept: FAMILY MEDICINE | Facility: CLINIC | Age: 85
End: 2020-04-28

## 2020-04-28 DIAGNOSIS — J44.9 COPD MIXED TYPE: ICD-10-CM

## 2020-04-28 PROCEDURE — 93298 CARDIAC DEVICE CHECK - REMOTE: ICD-10-PCS | Mod: S$GLB,,, | Performed by: INTERNAL MEDICINE

## 2020-04-28 PROCEDURE — 93298 REM INTERROG DEV EVAL SCRMS: CPT | Mod: S$GLB,,, | Performed by: INTERNAL MEDICINE

## 2020-04-28 RX ORDER — AMLODIPINE BESYLATE 5 MG/1
5 TABLET ORAL 2 TIMES DAILY
Qty: 90 TABLET | Refills: 2
Start: 2020-04-28 | End: 2020-05-24 | Stop reason: SDUPTHER

## 2020-04-28 NOTE — TELEPHONE ENCOUNTER
I have placed referral to pharmacy assistance and I have sent Rx to [facility] to see if they can assist with coverage.

## 2020-04-28 NOTE — TELEPHONE ENCOUNTER
Patient can no longer afford trelegy inhaler. Wants to know is there an alternate medication that can be prescribed. Checked with pharmacy they are unable to provided me with any information to which may be covered under patient insurance.         ----- Message from Adelaide Eddy sent at 4/27/2020  4:51 PM CDT -----  Contact: Patient  Patient called in regards to her fluticasone-umeclidin-vilanter (TRELEGY ELLIPTA) 100-62.5-25 mcg DsDv    Perez went up to $147    Requesting an alternative sent in please- she cannot afford it    Pharmacy: [facility] [phone] (Phone)     Phone number: [phone]

## 2020-05-01 ENCOUNTER — LAB VISIT (OUTPATIENT)
Dept: LAB | Facility: HOSPITAL | Age: 85
End: 2020-05-01
Attending: INTERNAL MEDICINE
Payer: MEDICARE

## 2020-05-01 ENCOUNTER — TELEPHONE (OUTPATIENT)
Dept: FAMILY MEDICINE | Facility: CLINIC | Age: 85
End: 2020-05-01

## 2020-05-01 DIAGNOSIS — D64.9 ANEMIA, UNSPECIFIED TYPE: ICD-10-CM

## 2020-05-01 DIAGNOSIS — N39.0 URINARY TRACT INFECTIOUS DISEASE: ICD-10-CM

## 2020-05-01 DIAGNOSIS — N25.81 SECONDARY HYPERPARATHYROIDISM OF RENAL ORIGIN: ICD-10-CM

## 2020-05-01 DIAGNOSIS — N18.4 CHRONIC KIDNEY DISEASE, STAGE IV (SEVERE): Primary | ICD-10-CM

## 2020-05-01 LAB
ALBUMIN SERPL BCP-MCNC: 3.5 G/DL (ref 3.5–5.2)
ANION GAP SERPL CALC-SCNC: 9 MMOL/L (ref 8–16)
BASOPHILS # BLD AUTO: 0.06 K/UL (ref 0–0.2)
BASOPHILS NFR BLD: 0.5 % (ref 0–1.9)
BUN SERPL-MCNC: 50 MG/DL (ref 8–23)
CALCIUM SERPL-MCNC: 9.6 MG/DL (ref 8.7–10.5)
CHLORIDE SERPL-SCNC: 104 MMOL/L (ref 95–110)
CO2 SERPL-SCNC: 26 MMOL/L (ref 23–29)
CREAT SERPL-MCNC: 1.7 MG/DL (ref 0.5–1.4)
DIFFERENTIAL METHOD: ABNORMAL
EOSINOPHIL # BLD AUTO: 0.3 K/UL (ref 0–0.5)
EOSINOPHIL NFR BLD: 2.5 % (ref 0–8)
ERYTHROCYTE [DISTWIDTH] IN BLOOD BY AUTOMATED COUNT: 13.2 % (ref 11.5–14.5)
EST. GFR  (AFRICAN AMERICAN): 30.4 ML/MIN/1.73 M^2
EST. GFR  (NON AFRICAN AMERICAN): 26.4 ML/MIN/1.73 M^2
GLUCOSE SERPL-MCNC: 37 MG/DL (ref 70–110)
HCT VFR BLD AUTO: 31.9 % (ref 37–48.5)
HGB BLD-MCNC: 9.9 G/DL (ref 12–16)
IMM GRANULOCYTES # BLD AUTO: 0.08 K/UL (ref 0–0.04)
IMM GRANULOCYTES NFR BLD AUTO: 0.7 % (ref 0–0.5)
LYMPHOCYTES # BLD AUTO: 4.7 K/UL (ref 1–4.8)
LYMPHOCYTES NFR BLD: 42.8 % (ref 18–48)
MCH RBC QN AUTO: 31.1 PG (ref 27–31)
MCHC RBC AUTO-ENTMCNC: 31 G/DL (ref 32–36)
MCV RBC AUTO: 100 FL (ref 82–98)
MONOCYTES # BLD AUTO: 1.2 K/UL (ref 0.3–1)
MONOCYTES NFR BLD: 10.8 % (ref 4–15)
NEUTROPHILS # BLD AUTO: 4.7 K/UL (ref 1.8–7.7)
NEUTROPHILS NFR BLD: 42.7 % (ref 38–73)
NRBC BLD-RTO: 0 /100 WBC
PHOSPHATE SERPL-MCNC: 3.8 MG/DL (ref 2.7–4.5)
PLATELET # BLD AUTO: 228 K/UL (ref 150–350)
PMV BLD AUTO: 11.5 FL (ref 9.2–12.9)
POTASSIUM SERPL-SCNC: 4.3 MMOL/L (ref 3.5–5.1)
PTH-INTACT SERPL-MCNC: 51 PG/ML (ref 9–77)
RBC # BLD AUTO: 3.18 M/UL (ref 4–5.4)
SODIUM SERPL-SCNC: 139 MMOL/L (ref 136–145)
URATE SERPL-MCNC: 7.4 MG/DL (ref 2.4–5.7)
WBC # BLD AUTO: 10.97 K/UL (ref 3.9–12.7)

## 2020-05-01 PROCEDURE — 36415 COLL VENOUS BLD VENIPUNCTURE: CPT | Mod: PO

## 2020-05-01 PROCEDURE — 83970 ASSAY OF PARATHORMONE: CPT

## 2020-05-01 PROCEDURE — 85025 COMPLETE CBC W/AUTO DIFF WBC: CPT

## 2020-05-01 PROCEDURE — 84550 ASSAY OF BLOOD/URIC ACID: CPT

## 2020-05-01 PROCEDURE — 80069 RENAL FUNCTION PANEL: CPT

## 2020-05-02 NOTE — TELEPHONE ENCOUNTER
On-call note:    Called by the lab with a blood glucose of 37.  This was drawn around 02:00 o'clock this afternoon.  New    I did call the patient.  She answered the phone on the 1st ring.  She was oriented and cooperative.  She was having no problems.  She is not taking any diabetes medications.  She was able to eat supper tonight.    I elected no intervention, but will notify her primary care doctor, Dr. Edmonds of this phone call.    Teo Beckford MD

## 2020-05-04 ENCOUNTER — TELEPHONE (OUTPATIENT)
Dept: FAMILY MEDICINE | Facility: CLINIC | Age: 85
End: 2020-05-04

## 2020-05-04 NOTE — TELEPHONE ENCOUNTER
BP avg from home log 151/51. Cont current meds.  Avoid aggressive  BP treatment due to erratic fluctuations and fragile elderly

## 2020-05-05 ENCOUNTER — HOSPITAL ENCOUNTER (EMERGENCY)
Facility: HOSPITAL | Age: 85
Discharge: HOME | End: 2020-05-06
Attending: EMERGENCY MEDICINE
Payer: MEDICARE

## 2020-05-05 ENCOUNTER — PATIENT MESSAGE (OUTPATIENT)
Dept: ADMINISTRATIVE | Facility: HOSPITAL | Age: 85
End: 2020-05-05

## 2020-05-05 DIAGNOSIS — M79.672 LEFT FOOT PAIN: ICD-10-CM

## 2020-05-05 DIAGNOSIS — R42 DIZZINESS: ICD-10-CM

## 2020-05-05 DIAGNOSIS — M19.072 ARTHRITIS OF LEFT FOOT: ICD-10-CM

## 2020-05-05 DIAGNOSIS — R53.1 WEAKNESS: ICD-10-CM

## 2020-05-05 DIAGNOSIS — R42 LIGHTHEADEDNESS: Primary | ICD-10-CM

## 2020-05-05 DIAGNOSIS — M79.672 FOOT PAIN, LEFT: ICD-10-CM

## 2020-05-05 LAB
ALBUMIN SERPL BCP-MCNC: 3.4 G/DL (ref 3.5–5.2)
ALP SERPL-CCNC: 72 U/L (ref 55–135)
ALT SERPL W/O P-5'-P-CCNC: 23 U/L (ref 10–44)
ANION GAP SERPL CALC-SCNC: 9 MMOL/L (ref 8–16)
AST SERPL-CCNC: 24 U/L (ref 10–40)
BASOPHILS # BLD AUTO: 0.05 K/UL (ref 0–0.2)
BASOPHILS NFR BLD: 0.5 % (ref 0–1.9)
BILIRUB SERPL-MCNC: 0.2 MG/DL (ref 0.1–1)
BUN SERPL-MCNC: 71 MG/DL (ref 8–23)
CALCIUM SERPL-MCNC: 9.1 MG/DL (ref 8.7–10.5)
CHLORIDE SERPL-SCNC: 103 MMOL/L (ref 95–110)
CO2 SERPL-SCNC: 25 MMOL/L (ref 23–29)
CREAT SERPL-MCNC: 1.9 MG/DL (ref 0.5–1.4)
DIFFERENTIAL METHOD: ABNORMAL
EOSINOPHIL # BLD AUTO: 0.1 K/UL (ref 0–0.5)
EOSINOPHIL NFR BLD: 1.5 % (ref 0–8)
ERYTHROCYTE [DISTWIDTH] IN BLOOD BY AUTOMATED COUNT: 13.3 % (ref 11.5–14.5)
EST. GFR  (AFRICAN AMERICAN): 27 ML/MIN/1.73 M^2
EST. GFR  (NON AFRICAN AMERICAN): 23 ML/MIN/1.73 M^2
GLUCOSE SERPL-MCNC: 91 MG/DL (ref 70–110)
HCT VFR BLD AUTO: 28.9 % (ref 37–48.5)
HGB BLD-MCNC: 9.3 G/DL (ref 12–16)
IMM GRANULOCYTES # BLD AUTO: 0.1 K/UL (ref 0–0.04)
IMM GRANULOCYTES NFR BLD AUTO: 1 % (ref 0–0.5)
LYMPHOCYTES # BLD AUTO: 2.6 K/UL (ref 1–4.8)
LYMPHOCYTES NFR BLD: 27.3 % (ref 18–48)
MCH RBC QN AUTO: 32 PG (ref 27–31)
MCHC RBC AUTO-ENTMCNC: 32.2 G/DL (ref 32–36)
MCV RBC AUTO: 99 FL (ref 82–98)
MONOCYTES # BLD AUTO: 1.4 K/UL (ref 0.3–1)
MONOCYTES NFR BLD: 14.4 % (ref 4–15)
NEUTROPHILS # BLD AUTO: 5.3 K/UL (ref 1.8–7.7)
NEUTROPHILS NFR BLD: 55.3 % (ref 38–73)
NRBC BLD-RTO: 0 /100 WBC
PLATELET # BLD AUTO: 188 K/UL (ref 150–350)
PMV BLD AUTO: 10.6 FL (ref 9.2–12.9)
POTASSIUM SERPL-SCNC: 4.2 MMOL/L (ref 3.5–5.1)
PROT SERPL-MCNC: 6.4 G/DL (ref 6–8.4)
RBC # BLD AUTO: 2.91 M/UL (ref 4–5.4)
SODIUM SERPL-SCNC: 137 MMOL/L (ref 136–145)
TROPONIN I SERPL DL<=0.01 NG/ML-MCNC: <0.006 NG/ML (ref 0–0.03)
WBC # BLD AUTO: 9.58 K/UL (ref 3.9–12.7)

## 2020-05-05 PROCEDURE — 80053 COMPREHEN METABOLIC PANEL: CPT

## 2020-05-05 PROCEDURE — 36415 COLL VENOUS BLD VENIPUNCTURE: CPT

## 2020-05-05 PROCEDURE — 93005 ELECTROCARDIOGRAM TRACING: CPT

## 2020-05-05 PROCEDURE — 85025 COMPLETE CBC W/AUTO DIFF WBC: CPT

## 2020-05-05 PROCEDURE — 84484 ASSAY OF TROPONIN QUANT: CPT

## 2020-05-05 PROCEDURE — 96361 HYDRATE IV INFUSION ADD-ON: CPT

## 2020-05-05 PROCEDURE — 25000003 PHARM REV CODE 250: Performed by: EMERGENCY MEDICINE

## 2020-05-05 PROCEDURE — 96374 THER/PROPH/DIAG INJ IV PUSH: CPT

## 2020-05-05 PROCEDURE — 99285 EMERGENCY DEPT VISIT HI MDM: CPT | Mod: 25

## 2020-05-05 RX ORDER — HYDROCODONE BITARTRATE AND ACETAMINOPHEN 5; 325 MG/1; MG/1
1 TABLET ORAL
Status: COMPLETED | OUTPATIENT
Start: 2020-05-05 | End: 2020-05-05

## 2020-05-05 RX ADMIN — HYDROCODONE BITARTRATE AND ACETAMINOPHEN 1 TABLET: 5; 325 TABLET ORAL at 11:05

## 2020-05-06 VITALS
HEART RATE: 61 BPM | TEMPERATURE: 99 F | OXYGEN SATURATION: 94 % | RESPIRATION RATE: 18 BRPM | HEIGHT: 64 IN | DIASTOLIC BLOOD PRESSURE: 76 MMHG | SYSTOLIC BLOOD PRESSURE: 189 MMHG | BODY MASS INDEX: 21 KG/M2 | WEIGHT: 123 LBS

## 2020-05-06 LAB
BACTERIA #/AREA URNS HPF: NORMAL /HPF
BILIRUB UR QL STRIP: NEGATIVE
CLARITY UR: CLEAR
COLOR UR: YELLOW
GLUCOSE UR QL STRIP: NEGATIVE
HGB UR QL STRIP: NEGATIVE
HYALINE CASTS #/AREA URNS LPF: 0 /LPF
KETONES UR QL STRIP: NEGATIVE
LEUKOCYTE ESTERASE UR QL STRIP: ABNORMAL
MICROSCOPIC COMMENT: NORMAL
NITRITE UR QL STRIP: NEGATIVE
PH UR STRIP: 6 [PH] (ref 5–8)
PROT UR QL STRIP: ABNORMAL
RBC #/AREA URNS HPF: 0 /HPF (ref 0–4)
SARS-COV-2 RDRP RESP QL NAA+PROBE: NEGATIVE
SP GR UR STRIP: <=1.005 (ref 1–1.03)
SQUAMOUS #/AREA URNS HPF: 5 /HPF
URN SPEC COLLECT METH UR: ABNORMAL
UROBILINOGEN UR STRIP-ACNC: NEGATIVE EU/DL
WBC #/AREA URNS HPF: 4 /HPF (ref 0–5)
WBC CLUMPS URNS QL MICRO: NORMAL

## 2020-05-06 PROCEDURE — 25000003 PHARM REV CODE 250: Performed by: EMERGENCY MEDICINE

## 2020-05-06 PROCEDURE — 81000 URINALYSIS NONAUTO W/SCOPE: CPT

## 2020-05-06 PROCEDURE — 63600175 PHARM REV CODE 636 W HCPCS: Performed by: EMERGENCY MEDICINE

## 2020-05-06 PROCEDURE — U0002 COVID-19 LAB TEST NON-CDC: HCPCS

## 2020-05-06 RX ORDER — TRAMADOL HYDROCHLORIDE 50 MG/1
50 TABLET ORAL EVERY 8 HOURS PRN
Qty: 9 TABLET | Refills: 0 | Status: SHIPPED | OUTPATIENT
Start: 2020-05-06 | End: 2020-09-18 | Stop reason: CLARIF

## 2020-05-06 RX ORDER — SERTRALINE HYDROCHLORIDE 25 MG/1
25 TABLET, FILM COATED ORAL DAILY
Qty: 90 TABLET | Refills: 3 | Status: SHIPPED | OUTPATIENT
Start: 2020-05-06 | End: 2021-06-28 | Stop reason: SDUPTHER

## 2020-05-06 RX ORDER — HYDROMORPHONE HYDROCHLORIDE 2 MG/ML
0.2 INJECTION, SOLUTION INTRAMUSCULAR; INTRAVENOUS; SUBCUTANEOUS
Status: COMPLETED | OUTPATIENT
Start: 2020-05-06 | End: 2020-05-06

## 2020-05-06 RX ADMIN — HYDROMORPHONE HYDROCHLORIDE 0.2 MG: 2 INJECTION INTRAMUSCULAR; INTRAVENOUS; SUBCUTANEOUS at 01:05

## 2020-05-06 RX ADMIN — SODIUM CHLORIDE 1000 ML: 0.9 INJECTION, SOLUTION INTRAVENOUS at 12:05

## 2020-05-06 NOTE — TELEPHONE ENCOUNTER
LOV: 4/8/2020  Next appt: none  Labs: 5/5/2020  Last refill:     Eliquis 12/2/19  Sertraline 11/29/19

## 2020-05-06 NOTE — ED PROVIDER NOTES
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  The patient had to sit down.  She felt slightly dizzy.  She denies any unilateral focal weakness or numbness significant headache double visions slurred speech chest pain abdominal pain nausea vomiting or diarrhea.  She does state that her left foot started to hurt over the past few hours.  She states she has chronic foot pain but the foot is becoming more restless this time.        Review of patient's allergies indicates:   Allergen Reactions    Carvedilol Other (See Comments)     Bradycardia and syncope    Boniva [ibandronate]     Codeine     Hydralazine analogues     Iodinated contrast media Hives    Kionex [sodium polystyrene sulfonate] Diarrhea     From encounter 12/11/17 for diarrhea--not true allergy.    Lisinopril     Morphine      Past Medical History:   Diagnosis Date    Anemia due to multiple mechanisms 6/29/2018    Anemia, chronic disease 6/29/2018    Anemia, chronic renal failure, stage 3 (moderate) 6/29/2018    Anticoagulant long-term use     aspirin    Aortic aneurysm     CHF (congestive heart failure)     COPD (chronic obstructive pulmonary disease)     COPD with acute exacerbation 1/9/2015    Diabetes mellitus type II     DVT (deep venous thrombosis) 06/09/2018    Encounter for blood  transfusion     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 2018    Hip arthritis 3/1/2016    Homocysteinemia 2018    Hyperlipidemia     Hypertension     Normocytic normochromic anemia 2018    PE (pulmonary thromboembolism) 2018    Pneumonia of right lower lobe due to infectious organism 2017    Thyroid disease     hypothyroid    Type 2 diabetes mellitus with stage 3 chronic kidney disease 2013     Past Surgical History:   Procedure Laterality Date    APPENDECTOMY      CHOLECYSTECTOMY      FRACTURE SURGERY      right hip     HERNIA REPAIR      groin    HYSTERECTOMY      INSERTION OF IMPLANTABLE LOOP RECORDER N/A 2018    Procedure: Insertion, Implantable Loop Recorder;  Surgeon: Ashok Herbert MD;  Location: [facility] CATH LAB;  Service: Cardiology;  Laterality: N/A;    PERCUTANEOUS PINNING OF HIP Left 3/23/2019    Procedure: PINNING, HIP, PERCUTANEOUS;  Surgeon: Jorje Hamlin MD;  Location: [facility] OR;  Service: Orthopedics;  Laterality: Left;    VARICOSE VEIN SURGERY       Family History   Problem Relation Age of Onset    Hypertension Mother     Heart disease Mother     Lung disease Father     Diabetes Sister     Diabetes Brother     Kidney disease Son      Social History     Tobacco Use    Smoking status: Former Smoker     Packs/day: 0.35     Years: 44.00     Pack years: 15.40     Types: Cigarettes     Last attempt to quit: 2015     Years since quittin.0    Smokeless tobacco: Never Used    Tobacco comment: 2015   Substance Use Topics    Alcohol use: No     Alcohol/week: 0.0 standard drinks     Frequency: Never     Binge frequency: Never    Drug use: No     Review of Systems   Constitutional: Positive for fatigue. Negative for appetite change, chills, diaphoresis and fever.   HENT: Negative for congestion.    Respiratory: Negative for cough and shortness of breath.    Cardiovascular: Negative for chest pain, palpitations and leg swelling.    Gastrointestinal: Negative for abdominal pain, diarrhea, nausea and vomiting.   Endocrine: Negative for polydipsia and polyuria.   Genitourinary: Negative for difficulty urinating, dysuria, flank pain, frequency and genital sores.   Musculoskeletal: Positive for arthralgias. Negative for back pain, joint swelling, myalgias and neck pain.   Skin: Negative for rash and wound.   Neurological: Positive for tremors and light-headedness. Negative for seizures, weakness, numbness and headaches.   Psychiatric/Behavioral: Negative for agitation and confusion.       Physical Exam     Initial Vitals [05/05/20 2222]   BP Pulse Resp Temp SpO2   (!) 142/61 65 18 98.5 °F (36.9 °C) 96 %      MAP       --         Physical Exam    Constitutional: She appears well-developed and well-nourished. No distress.   HENT:   Head: Normocephalic and atraumatic.   Mouth/Throat: Oropharynx is clear and moist.   Eyes: Conjunctivae and EOM are normal. Pupils are equal, round, and reactive to light.   No vertical nystagmus.  Normal test of skew   Neck: Normal range of motion.   Cardiovascular: Normal rate, regular rhythm, normal heart sounds and intact distal pulses. Exam reveals no gallop and no friction rub.    No murmur heard.  1+ b/l DP and PT pulses in the lower ext     Pulmonary/Chest: Breath sounds normal. She has no wheezes. She has no rhonchi. She has no rales.   Abdominal: Soft. There is no tenderness. There is no rebound and no guarding.   Musculoskeletal: She exhibits tenderness (tender to palpation over the dorsal surface of the foot.). She exhibits no edema.   Neurological: She is alert and oriented to person, place, and time. She has normal strength. No sensory deficit. GCS score is 15. GCS eye subscore is 4. GCS verbal subscore is 5. GCS motor subscore is 6.   Skin: Skin is warm and dry. No rash noted.   No significant erythema edema or warmth of the lower extremity   Psychiatric: She has a normal mood and affect.         ED Course    Procedures  Labs Reviewed   URINALYSIS, REFLEX TO URINE CULTURE - Abnormal; Notable for the following components:       Result Value    Specific Gravity, UA <=1.005 (*)     Protein, UA 1+ (*)     Leukocytes, UA 2+ (*)     All other components within normal limits    Narrative:     Preferred Collection Type->Urine, Clean Catch   CBC W/ AUTO DIFFERENTIAL - Abnormal; Notable for the following components:    RBC 2.91 (*)     Hemoglobin 9.3 (*)     Hematocrit 28.9 (*)     Mean Corpuscular Volume 99 (*)     Mean Corpuscular Hemoglobin 32.0 (*)     Immature Granulocytes 1.0 (*)     Immature Grans (Abs) 0.10 (*)     Mono # 1.4 (*)     All other components within normal limits   COMPREHENSIVE METABOLIC PANEL - Abnormal; Notable for the following components:    BUN, Bld 71 (*)     Creatinine 1.9 (*)     Albumin 3.4 (*)     eGFR if  27 (*)     eGFR if non  23 (*)     All other components within normal limits   TROPONIN I   SARS-COV-2 RNA AMPLIFICATION, QUAL   URINALYSIS MICROSCOPIC    Narrative:     Preferred Collection Type->Urine, Clean Catch          Imaging Results          US Lower Extrem Arteries Left with OLIVIA (In process)  Result time 05/06/20 02:04:17   Procedure changed from US Lower Extremity Arteries Left                X-Ray Foot Complete Left (In process)                X-Ray Chest PA And Lateral (Final result)  Result time 05/06/20 00:27:26    Final result by Keith Reyez MD (05/06/20 00:27:26)                 Impression:      No acute radiographic abnormality with no significant change.      Electronically signed by: Keith Reyez  Date:    05/06/2020  Time:    00:27             Narrative:    EXAMINATION:  XR CHEST PA AND LATERAL    CLINICAL HISTORY:  Weakness    TECHNIQUE:  PA and lateral views of the chest were performed.    COMPARISON:  03/28/2020    FINDINGS:  The heart is mildly enlarged.  No edema is detected.  Loop recorder on the left.    No focal mass, infiltrate or  consolidation.    No acute osseous abnormality.  No effusion or pneumothorax.    No significant change.                               CT Head Without Contrast (Final result)  Result time 05/05/20 23:19:26    Final result by Keith Reyez MD (05/05/20 23:19:26)                 Impression:      1. No acute intracranial process.  2. Involutional changes with chronic microvascular ischemic changes and small remote left inferior cerebellar lacunar infarct.      Electronically signed by: Keith Reyez  Date:    05/05/2020  Time:    23:19             Narrative:    EXAMINATION:  CT HEAD WITHOUT CONTRAST    CLINICAL HISTORY:  Dizziness;    TECHNIQUE:  Low dose axial images were obtained through the head.  Coronal and sagittal reformations were also performed. Contrast was not administered.    COMPARISON:  07/02/2019    FINDINGS:  The brain appears normally formed.  There is no evidence of any focal mass or hemorrhage.  No evidence of midline shift or hydrocephalus.    Moderate involutional changes and probable chronic microvascular ischemic changes in the periventricular white matter.    Small remote lacunar infarct in the inferior left cerebellum.    No evidence of major vascular infarction.    No evidence of calvarial fracture.    Orbits, globes and paranasal sinuses are adequately maintained.  Mastoid air cells are clear.    No significant change.                                                   ED Course as of May 06 0320   Wed May 06, 2020   0308 Patient had episode of dizziness earlier.  She has no signs of central neurologic deficits at this time.  She has chronic renal insufficiency and feels better after fluids.  Chest x-ray shows nothing acute.  CT head shows nothing acute.  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    [JS]   0319 Patient able to stand up and ambulate.  Suspect she has arthritis of her foot.  She does not feel dizzy at this time.  If she did have a TIA she is already on Eliquis,  statin, antihypertensive.  She can follow-up with Neurology or her regular doctor.  I do not believe she has an ischemic limb at this time.  The foot is not cool.  She has palpable pulses.  She has monophasic blood flow.  I do feel that she is stable for discharge.  She needs to follow up with primary care.    [JS]      ED Course User Index  [JS] Tony Mckeon MD                Clinical Impression:       ICD-10-CM ICD-9-CM   1. Lightheadedness R42 780.4   2. Dizziness R42 780.4   3. Weakness R53.1 780.79   4. Left foot pain M79.672 729.5   5. Foot pain, left M79.672 729.5   6. Arthritis of left foot M19.072 716.97                                Tony Mckeon MD  05/06/20 0322

## 2020-05-07 ENCOUNTER — HOSPITAL ENCOUNTER (OUTPATIENT)
Facility: HOSPITAL | Age: 85
Discharge: HOME | End: 2020-05-09
Attending: EMERGENCY MEDICINE | Admitting: INTERNAL MEDICINE
Payer: MEDICARE

## 2020-05-07 ENCOUNTER — TELEPHONE (OUTPATIENT)
Dept: FAMILY MEDICINE | Facility: CLINIC | Age: 85
End: 2020-05-07

## 2020-05-07 ENCOUNTER — OFFICE VISIT (OUTPATIENT)
Dept: FAMILY MEDICINE | Facility: CLINIC | Age: 85
End: 2020-05-07
Payer: MEDICARE

## 2020-05-07 VITALS
OXYGEN SATURATION: 95 % | BODY MASS INDEX: 21.11 KG/M2 | SYSTOLIC BLOOD PRESSURE: 132 MMHG | HEART RATE: 66 BPM | TEMPERATURE: 98 F | DIASTOLIC BLOOD PRESSURE: 58 MMHG | HEIGHT: 64 IN | WEIGHT: 123.69 LBS

## 2020-05-07 DIAGNOSIS — R42 DIZZINESS: ICD-10-CM

## 2020-05-07 DIAGNOSIS — R10.9 ABDOMINAL PAIN, UNSPECIFIED ABDOMINAL LOCATION: ICD-10-CM

## 2020-05-07 DIAGNOSIS — M79.672 LEFT FOOT PAIN: ICD-10-CM

## 2020-05-07 DIAGNOSIS — E11.59 HYPERTENSION ASSOCIATED WITH DIABETES: Primary | ICD-10-CM

## 2020-05-07 DIAGNOSIS — R53.1 WEAKNESS: ICD-10-CM

## 2020-05-07 DIAGNOSIS — I15.2 HYPERTENSION ASSOCIATED WITH DIABETES: Primary | ICD-10-CM

## 2020-05-07 PROBLEM — N18.4 CKD (CHRONIC KIDNEY DISEASE), STAGE IV: Status: ACTIVE | Noted: 2020-05-07

## 2020-05-07 LAB
ANION GAP SERPL CALC-SCNC: 11 MMOL/L (ref 8–16)
BASOPHILS # BLD AUTO: 0.04 K/UL (ref 0–0.2)
BASOPHILS NFR BLD: 0.5 % (ref 0–1.9)
BUN SERPL-MCNC: 64 MG/DL (ref 8–23)
CALCIUM SERPL-MCNC: 9.2 MG/DL (ref 8.7–10.5)
CHLORIDE SERPL-SCNC: 107 MMOL/L (ref 95–110)
CO2 SERPL-SCNC: 22 MMOL/L (ref 23–29)
CREAT SERPL-MCNC: 1.5 MG/DL (ref 0.5–1.4)
DIFFERENTIAL METHOD: ABNORMAL
EOSINOPHIL # BLD AUTO: 0.2 K/UL (ref 0–0.5)
EOSINOPHIL NFR BLD: 2.1 % (ref 0–8)
ERYTHROCYTE [DISTWIDTH] IN BLOOD BY AUTOMATED COUNT: 13.4 % (ref 11.5–14.5)
EST. GFR  (AFRICAN AMERICAN): 35 ML/MIN/1.73 M^2
EST. GFR  (NON AFRICAN AMERICAN): 31 ML/MIN/1.73 M^2
GLUCOSE SERPL-MCNC: 66 MG/DL (ref 70–110)
HCT VFR BLD AUTO: 30.2 % (ref 37–48.5)
HGB BLD-MCNC: 9.7 G/DL (ref 12–16)
IMM GRANULOCYTES # BLD AUTO: 0.08 K/UL (ref 0–0.04)
IMM GRANULOCYTES NFR BLD AUTO: 0.9 % (ref 0–0.5)
LYMPHOCYTES # BLD AUTO: 2 K/UL (ref 1–4.8)
LYMPHOCYTES NFR BLD: 23.5 % (ref 18–48)
MCH RBC QN AUTO: 31.8 PG (ref 27–31)
MCHC RBC AUTO-ENTMCNC: 32.1 G/DL (ref 32–36)
MCV RBC AUTO: 99 FL (ref 82–98)
MONOCYTES # BLD AUTO: 1 K/UL (ref 0.3–1)
MONOCYTES NFR BLD: 12.2 % (ref 4–15)
NEUTROPHILS # BLD AUTO: 5.2 K/UL (ref 1.8–7.7)
NEUTROPHILS NFR BLD: 60.8 % (ref 38–73)
NRBC BLD-RTO: 0 /100 WBC
PLATELET # BLD AUTO: 166 K/UL (ref 150–350)
PMV BLD AUTO: 10.7 FL (ref 9.2–12.9)
POTASSIUM SERPL-SCNC: 4.1 MMOL/L (ref 3.5–5.1)
RBC # BLD AUTO: 3.05 M/UL (ref 4–5.4)
SARS-COV-2 RDRP RESP QL NAA+PROBE: NEGATIVE
SODIUM SERPL-SCNC: 140 MMOL/L (ref 136–145)
TROPONIN I SERPL DL<=0.01 NG/ML-MCNC: <0.006 NG/ML (ref 0–0.03)
TROPONIN I SERPL DL<=0.01 NG/ML-MCNC: <0.006 NG/ML (ref 0–0.03)
WBC # BLD AUTO: 8.47 K/UL (ref 3.9–12.7)

## 2020-05-07 PROCEDURE — 1125F AMNT PAIN NOTED PAIN PRSNT: CPT | Mod: S$GLB,,, | Performed by: NURSE PRACTITIONER

## 2020-05-07 PROCEDURE — 84484 ASSAY OF TROPONIN QUANT: CPT

## 2020-05-07 PROCEDURE — 1159F PR MEDICATION LIST DOCUMENTED IN MEDICAL RECORD: ICD-10-PCS | Mod: S$GLB,,, | Performed by: NURSE PRACTITIONER

## 2020-05-07 PROCEDURE — 99285 EMERGENCY DEPT VISIT HI MDM: CPT | Mod: 25

## 2020-05-07 PROCEDURE — 85025 COMPLETE CBC W/AUTO DIFF WBC: CPT

## 2020-05-07 PROCEDURE — 83540 ASSAY OF IRON: CPT

## 2020-05-07 PROCEDURE — 99999 PR PBB SHADOW E&M-EST. PATIENT-LVL III: ICD-10-PCS | Mod: PBBFAC,,, | Performed by: NURSE PRACTITIONER

## 2020-05-07 PROCEDURE — G0378 HOSPITAL OBSERVATION PER HR: HCPCS

## 2020-05-07 PROCEDURE — U0002 COVID-19 LAB TEST NON-CDC: HCPCS

## 2020-05-07 PROCEDURE — 25000003 PHARM REV CODE 250: Performed by: INTERNAL MEDICINE

## 2020-05-07 PROCEDURE — 99999 PR PBB SHADOW E&M-EST. PATIENT-LVL III: CPT | Mod: PBBFAC,,, | Performed by: NURSE PRACTITIONER

## 2020-05-07 PROCEDURE — 99214 PR OFFICE/OUTPT VISIT, EST, LEVL IV, 30-39 MIN: ICD-10-PCS | Mod: S$GLB,,, | Performed by: NURSE PRACTITIONER

## 2020-05-07 PROCEDURE — 80048 BASIC METABOLIC PNL TOTAL CA: CPT

## 2020-05-07 PROCEDURE — 99214 OFFICE O/P EST MOD 30 MIN: CPT | Mod: S$GLB,,, | Performed by: NURSE PRACTITIONER

## 2020-05-07 PROCEDURE — 36415 COLL VENOUS BLD VENIPUNCTURE: CPT

## 2020-05-07 PROCEDURE — 96361 HYDRATE IV INFUSION ADD-ON: CPT | Performed by: EMERGENCY MEDICINE

## 2020-05-07 PROCEDURE — 1159F MED LIST DOCD IN RCRD: CPT | Mod: S$GLB,,, | Performed by: NURSE PRACTITIONER

## 2020-05-07 PROCEDURE — 99900035 HC TECH TIME PER 15 MIN (STAT)

## 2020-05-07 PROCEDURE — 96360 HYDRATION IV INFUSION INIT: CPT

## 2020-05-07 PROCEDURE — 82607 VITAMIN B-12: CPT

## 2020-05-07 PROCEDURE — 93005 ELECTROCARDIOGRAM TRACING: CPT

## 2020-05-07 PROCEDURE — 1101F PR PT FALLS ASSESS DOC 0-1 FALLS W/OUT INJ PAST YR: ICD-10-PCS | Mod: CPTII,S$GLB,, | Performed by: NURSE PRACTITIONER

## 2020-05-07 PROCEDURE — 25000003 PHARM REV CODE 250: Performed by: EMERGENCY MEDICINE

## 2020-05-07 PROCEDURE — 82746 ASSAY OF FOLIC ACID SERUM: CPT

## 2020-05-07 PROCEDURE — 1125F PR PAIN SEVERITY QUANTIFIED, PAIN PRESENT: ICD-10-PCS | Mod: S$GLB,,, | Performed by: NURSE PRACTITIONER

## 2020-05-07 PROCEDURE — 1101F PT FALLS ASSESS-DOCD LE1/YR: CPT | Mod: CPTII,S$GLB,, | Performed by: NURSE PRACTITIONER

## 2020-05-07 RX ORDER — AMLODIPINE BESYLATE 5 MG/1
5 TABLET ORAL 2 TIMES DAILY
Status: DISCONTINUED | OUTPATIENT
Start: 2020-05-07 | End: 2020-05-09 | Stop reason: HOSPADM

## 2020-05-07 RX ORDER — SODIUM,POTASSIUM PHOSPHATES 280-250MG
2 POWDER IN PACKET (EA) ORAL
Status: DISCONTINUED | OUTPATIENT
Start: 2020-05-07 | End: 2020-05-09 | Stop reason: HOSPADM

## 2020-05-07 RX ORDER — SODIUM CHLORIDE 9 MG/ML
1000 INJECTION, SOLUTION INTRAVENOUS
Status: COMPLETED | OUTPATIENT
Start: 2020-05-07 | End: 2020-05-07

## 2020-05-07 RX ORDER — LANOLIN ALCOHOL/MO/W.PET/CERES
800 CREAM (GRAM) TOPICAL
Status: DISCONTINUED | OUTPATIENT
Start: 2020-05-07 | End: 2020-05-09 | Stop reason: HOSPADM

## 2020-05-07 RX ORDER — ACETAMINOPHEN 325 MG/1
650 TABLET ORAL EVERY 4 HOURS PRN
Status: DISCONTINUED | OUTPATIENT
Start: 2020-05-07 | End: 2020-05-09 | Stop reason: HOSPADM

## 2020-05-07 RX ORDER — SIMVASTATIN 40 MG/1
40 TABLET, FILM COATED ORAL NIGHTLY
Status: DISCONTINUED | OUTPATIENT
Start: 2020-05-07 | End: 2020-05-09 | Stop reason: HOSPADM

## 2020-05-07 RX ORDER — SERTRALINE HYDROCHLORIDE 25 MG/1
25 TABLET, FILM COATED ORAL DAILY
Status: DISCONTINUED | OUTPATIENT
Start: 2020-05-08 | End: 2020-05-09 | Stop reason: HOSPADM

## 2020-05-07 RX ORDER — IBUPROFEN 200 MG
16 TABLET ORAL
Status: DISCONTINUED | OUTPATIENT
Start: 2020-05-07 | End: 2020-05-09 | Stop reason: HOSPADM

## 2020-05-07 RX ORDER — GLUCAGON 1 MG
1 KIT INJECTION
Status: DISCONTINUED | OUTPATIENT
Start: 2020-05-07 | End: 2020-05-09 | Stop reason: HOSPADM

## 2020-05-07 RX ORDER — POTASSIUM CHLORIDE 20 MEQ/15ML
40 SOLUTION ORAL
Status: DISCONTINUED | OUTPATIENT
Start: 2020-05-07 | End: 2020-05-09 | Stop reason: HOSPADM

## 2020-05-07 RX ORDER — DOCUSATE SODIUM 100 MG/1
100 CAPSULE, LIQUID FILLED ORAL 2 TIMES DAILY
Status: DISCONTINUED | OUTPATIENT
Start: 2020-05-07 | End: 2020-05-09 | Stop reason: HOSPADM

## 2020-05-07 RX ORDER — GLUCOSAM/CHONDRO/HERB 149/HYAL 750-100 MG
1 TABLET ORAL DAILY
Status: DISCONTINUED | OUTPATIENT
Start: 2020-05-08 | End: 2020-05-09 | Stop reason: HOSPADM

## 2020-05-07 RX ORDER — IPRATROPIUM BROMIDE AND ALBUTEROL SULFATE 2.5; .5 MG/3ML; MG/3ML
3 SOLUTION RESPIRATORY (INHALATION) EVERY 6 HOURS PRN
Status: DISCONTINUED | OUTPATIENT
Start: 2020-05-07 | End: 2020-05-09 | Stop reason: HOSPADM

## 2020-05-07 RX ORDER — FERROUS SULFATE 325(65) MG
325 TABLET, DELAYED RELEASE (ENTERIC COATED) ORAL DAILY
Status: DISCONTINUED | OUTPATIENT
Start: 2020-05-08 | End: 2020-05-09 | Stop reason: HOSPADM

## 2020-05-07 RX ORDER — CALCIUM CARBONATE 500(1250)
500 TABLET ORAL 2 TIMES DAILY
Status: DISCONTINUED | OUTPATIENT
Start: 2020-05-07 | End: 2020-05-09 | Stop reason: HOSPADM

## 2020-05-07 RX ORDER — MONTELUKAST SODIUM 10 MG/1
10 TABLET ORAL NIGHTLY
Status: DISCONTINUED | OUTPATIENT
Start: 2020-05-07 | End: 2020-05-09 | Stop reason: HOSPADM

## 2020-05-07 RX ORDER — INSULIN ASPART 100 [IU]/ML
0-5 INJECTION, SOLUTION INTRAVENOUS; SUBCUTANEOUS
Status: DISCONTINUED | OUTPATIENT
Start: 2020-05-07 | End: 2020-05-09 | Stop reason: HOSPADM

## 2020-05-07 RX ORDER — SODIUM CHLORIDE 0.9 % (FLUSH) 0.9 %
10 SYRINGE (ML) INJECTION
Status: DISCONTINUED | OUTPATIENT
Start: 2020-05-07 | End: 2020-05-09 | Stop reason: HOSPADM

## 2020-05-07 RX ORDER — OMEGA-3/DHA/EPA/FISH OIL 300-1000MG
1 CAPSULE,DELAYED RELEASE (ENTERIC COATED) ORAL NIGHTLY
Status: DISCONTINUED | OUTPATIENT
Start: 2020-05-07 | End: 2020-05-07 | Stop reason: SDUPTHER

## 2020-05-07 RX ORDER — PANTOPRAZOLE SODIUM 40 MG/1
40 TABLET, DELAYED RELEASE ORAL DAILY
Status: DISCONTINUED | OUTPATIENT
Start: 2020-05-08 | End: 2020-05-09 | Stop reason: HOSPADM

## 2020-05-07 RX ORDER — IBUPROFEN 200 MG
24 TABLET ORAL
Status: DISCONTINUED | OUTPATIENT
Start: 2020-05-07 | End: 2020-05-09 | Stop reason: HOSPADM

## 2020-05-07 RX ORDER — LANOLIN ALCOHOL/MO/W.PET/CERES
400 CREAM (GRAM) TOPICAL DAILY
Status: DISCONTINUED | OUTPATIENT
Start: 2020-05-08 | End: 2020-05-09 | Stop reason: HOSPADM

## 2020-05-07 RX ORDER — ASCORBIC ACID 500 MG
500 TABLET ORAL DAILY
Status: DISCONTINUED | OUTPATIENT
Start: 2020-05-08 | End: 2020-05-09 | Stop reason: HOSPADM

## 2020-05-07 RX ORDER — SODIUM CHLORIDE 450 MG/100ML
INJECTION, SOLUTION INTRAVENOUS CONTINUOUS
Status: DISCONTINUED | OUTPATIENT
Start: 2020-05-07 | End: 2020-05-09 | Stop reason: HOSPADM

## 2020-05-07 RX ORDER — HEPARIN SODIUM 5000 [USP'U]/ML
5000 INJECTION, SOLUTION INTRAVENOUS; SUBCUTANEOUS EVERY 8 HOURS
Status: CANCELLED | OUTPATIENT
Start: 2020-05-07

## 2020-05-07 RX ORDER — LEVOTHYROXINE SODIUM 88 UG/1
88 TABLET ORAL
Status: DISCONTINUED | OUTPATIENT
Start: 2020-05-08 | End: 2020-05-09 | Stop reason: HOSPADM

## 2020-05-07 RX ORDER — ASPIRIN 81 MG/1
81 TABLET ORAL DAILY
Status: DISCONTINUED | OUTPATIENT
Start: 2020-05-08 | End: 2020-05-09 | Stop reason: HOSPADM

## 2020-05-07 RX ORDER — ONDANSETRON 2 MG/ML
4 INJECTION INTRAMUSCULAR; INTRAVENOUS EVERY 8 HOURS PRN
Status: DISCONTINUED | OUTPATIENT
Start: 2020-05-07 | End: 2020-05-09 | Stop reason: HOSPADM

## 2020-05-07 RX ORDER — TRAMADOL HYDROCHLORIDE 50 MG/1
50 TABLET ORAL EVERY 8 HOURS PRN
Status: DISCONTINUED | OUTPATIENT
Start: 2020-05-07 | End: 2020-05-09 | Stop reason: HOSPADM

## 2020-05-07 RX ADMIN — TRAMADOL HYDROCHLORIDE 50 MG: 50 TABLET, FILM COATED ORAL at 11:05

## 2020-05-07 RX ADMIN — SIMVASTATIN 40 MG: 40 TABLET, FILM COATED ORAL at 10:05

## 2020-05-07 RX ADMIN — CALCIUM 500 MG: 500 TABLET ORAL at 10:05

## 2020-05-07 RX ADMIN — SODIUM CHLORIDE 1000 ML: 0.9 INJECTION, SOLUTION INTRAVENOUS at 05:05

## 2020-05-07 RX ADMIN — AMLODIPINE BESYLATE 5 MG: 5 TABLET ORAL at 10:05

## 2020-05-07 RX ADMIN — DOCUSATE SODIUM 100 MG: 100 CAPSULE, LIQUID FILLED ORAL at 10:05

## 2020-05-07 RX ADMIN — SODIUM CHLORIDE: 0.45 INJECTION, SOLUTION INTRAVENOUS at 10:05

## 2020-05-07 RX ADMIN — APIXABAN 5 MG: 2.5 TABLET, FILM COATED ORAL at 10:05

## 2020-05-07 RX ADMIN — MONTELUKAST 10 MG: 10 TABLET, FILM COATED ORAL at 10:05

## 2020-05-07 NOTE — ED PROVIDER NOTES
"Encounter Date: 5/7/2020    SCRIBE #1 NOTE: I, Tony Martinez, am scribing for, and in the presence of, Dr. Dudley.       History     Chief Complaint   Patient presents with    Dizziness    Fatigue     Time seen by provider: 5:16 PM on 05/07/2020      Blaire Cage is a 89 y.o. female with a PMHx of HTN, COPD, T2DM, hx of PE, aortic aneurysm, CHF, CKD, and anemia who presents to the ED for dizziness that started 1 day ago. The patient states that she had an episode of dizziness 1 night ago, but states that she has been dealing with lightheadedness and dizziness for the last month. Patient states that she suffered this bout of dizziness when she was walking with her walker last night, but states that it subsided. She states that "It's like a swirling for me." She also admits that she has some lightheadedness from sitting to standing. Patient also endorses having loss of appetite and some fatigue for the last 3 days. She also states that she saw her PCP today, who recommended she come to the ED for further evaluation. The patient was in the ED 1 day ago for the same compliant of dizziness. The patient denies headache, fever, chest pain, numbness, weakness, abdominal pain, SOB, or any other complaint at this time. The patient has a PSHx of hysterectomy, appendectomy, fracture surgery, and hernia repair.     The history is provided by the patient.     Review of patient's allergies indicates:   Allergen Reactions    Carvedilol Other (See Comments)     Bradycardia and syncope    Boniva [ibandronate]     Codeine     Hydralazine analogues     Iodinated contrast media Hives    Kionex [sodium polystyrene sulfonate] Diarrhea     From encounter 12/11/17 for diarrhea--not true allergy.   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    Hip arthritis 3/1/2016    Homocysteinemia 2018    Hyperlipidemia     Hypertension     Normocytic normochromic anemia 2018    PE (pulmonary thromboembolism) 2018    Pneumonia of right lower lobe due to infectious organism 2017    Thyroid disease     hypothyroid    Type 2 diabetes mellitus with stage 3 chronic kidney disease 2013     Past Surgical History:   Procedure Laterality Date    APPENDECTOMY      CHOLECYSTECTOMY      FRACTURE SURGERY      right hip     HERNIA REPAIR      groin    HYSTERECTOMY      INSERTION OF IMPLANTABLE LOOP RECORDER N/A 2018    Procedure: Insertion, Implantable Loop Recorder;  Surgeon: Ashok Herbert MD;  Location: [facility] CATH LAB;  Service: Cardiology;  Laterality: N/A;    PERCUTANEOUS PINNING OF HIP Left 3/23/2019    Procedure: PINNING, HIP, PERCUTANEOUS;  Surgeon: Jorje aHmlin MD;  Location: [facility] OR;  Service: Orthopedics;  Laterality: Left;    VARICOSE VEIN SURGERY       Family History   Problem Relation Age of Onset    Hypertension Mother     Heart disease Mother     Lung disease Father     Diabetes Sister     Diabetes Brother     Kidney disease Son      Social History     Tobacco Use    Smoking status: Former Smoker     Packs/day: 0.35     Years: 44.00     Pack years: 15.40     Types: Cigarettes     Last attempt to quit: 2015     Years since quittin.0    Smokeless tobacco: Never Used    Tobacco comment: 2015   Substance Use Topics    Alcohol use: No     Alcohol/week: 0.0 standard drinks     Frequency: Never     Binge frequency: Never    Drug use: No     Review  of Systems   Constitutional: Positive for appetite change and fatigue. Negative for chills and fever.   HENT: Negative for congestion.    Eyes: Negative for visual disturbance.   Respiratory: Negative for cough and shortness of breath.    Cardiovascular: Negative for chest pain.   Gastrointestinal: Negative for abdominal pain, blood in stool, diarrhea, nausea and vomiting.   Genitourinary: Negative for dysuria and hematuria.   Musculoskeletal: Negative for myalgias.   Skin: Negative for wound.   Neurological: Positive for dizziness and light-headedness. Negative for weakness, numbness and headaches.   Hematological: Bruises/bleeds easily.       Physical Exam     Initial Vitals   BP Pulse Resp Temp SpO2   05/07/20 1841 05/07/20 1702 05/07/20 1702 05/07/20 1702 05/07/20 1702   (!) 177/86 60 18 97.7 °F (36.5 °C) 97 %      MAP       --                Physical Exam    Nursing note and vitals reviewed.  Constitutional: She appears well-developed and well-nourished. She is not diaphoretic. No distress.   Frail appearing   HENT:   Head: Normocephalic and atraumatic.   Mouth/Throat: Oropharynx is clear and moist.   Eyes: Conjunctivae are normal.   Neck: Neck supple.   Cardiovascular: Normal rate, regular rhythm, normal heart sounds and intact distal pulses. Exam reveals no gallop and no friction rub.    No murmur heard.  Pulses:       Radial pulses are 2+ on the right side, and 2+ on the left side.   Pulmonary/Chest: Breath sounds normal. She has no wheezes. She has no rhonchi. She has no rales.   Abdominal: Soft. She exhibits no distension. There is no tenderness.   Musculoskeletal: Normal range of motion.   Neurological: She is alert and oriented to person, place, and time.   5/5 strength and normal sensation. Good finger to nose bilaterally. Able to stand with assistance and walk.   Skin: No rash noted. No erythema.   Psychiatric: Her speech is normal.         ED Course   Procedures  Labs Reviewed   CBC W/ AUTO  DIFFERENTIAL - Abnormal; Notable for the following components:       Result Value    RBC 3.05 (*)     Hemoglobin 9.7 (*)     Hematocrit 30.2 (*)     Mean Corpuscular Volume 99 (*)     Mean Corpuscular Hemoglobin 31.8 (*)     Immature Granulocytes 0.9 (*)     Immature Grans (Abs) 0.08 (*)     All other components within normal limits   BASIC METABOLIC PANEL - Abnormal; Notable for the following components:    CO2 22 (*)     Glucose 66 (*)     BUN, Bld 64 (*)     Creatinine 1.5 (*)     eGFR if  35 (*)     eGFR if non  31 (*)     All other components within normal limits   SARS-COV-2 RNA AMPLIFICATION, QUAL          Imaging Results    None          Medical Decision Making:   History:   Old Medical Records: I decided to obtain old medical records.  Independently Interpreted Test(s):   I have ordered and independently interpreted EKG Reading(s) - see summary below  Clinical Tests:   Lab Tests: Ordered and Reviewed  Medical Tests: Ordered and Reviewed            Scribe Attestation:   Scribe #1: I performed the above scribed service and the documentation accurately describes the services I performed. I attest to the accuracy of the note.    I, Dr. Harpreet Dudley, personally performed the services described in this documentation. All medical record entries made by the scribe were at my direction and in my presence.  I have reviewed the chart and agree that the record reflects my personal performance and is accurate and complete. Harpreet Dudley MD.  8:18 PM 05/07/2020    Blaire Cage is a 89 y.o. female presenting with recurrent subjective lightheadedness postural in nature.  Dehydration considered with patient was given IV fluids and immediately directed back today after discharge follow-up in primary care office.  Recent workup reviewed.  Laboratories and EKG repeated.  Previous head CT reviewed.  Low suspicion for infectious or metabolic etiology.  IV fluids once again given  here although I will admit for observation and further consideration of other etiologies including possible MRI to rule out posterior ischemic etiology.  Patient notably has had 1 month duration of symptoms.  I suspect combination of possible mild dehydration with age related variant of positional orthostasis.  There is no deficit evident here on my exam although gait is limited requiring assistance, the patient's baseline ability for some time.  I have discussed with medicine who will assume care.        ED Course as of May 07 2018   Thu May 07, 2020   1745 EKG: Sinus bradycardia, rate of 57, normal intervals, L axis.  There are no acute ST or T wave changes suggestive of acute ischemia or infarction.      [MR]      ED Course User Index  [MR] Harpreet Dudley MD                Clinical Impression:       ICD-10-CM ICD-9-CM   1. Dizziness R42 780.4         Disposition:   Disposition: Admitted     ED Disposition Condition    Observation                           Harpreet Dudley MD  05/07/20 2020

## 2020-05-07 NOTE — SUBJECTIVE & OBJECTIVE
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 8/7/2018    Hip arthritis 3/1/2016    Homocysteinemia 8/7/2018    Hyperlipidemia     Hypertension     Normocytic normochromic anemia 6/29/2018    PE (pulmonary thromboembolism) 06/09/2018    Pneumonia of right lower lobe due to infectious organism 9/11/2017    Thyroid disease     hypothyroid    Type 2 diabetes mellitus with stage 3 chronic kidney disease 1/18/2013       Past Surgical History:   Procedure Laterality Date    APPENDECTOMY      CHOLECYSTECTOMY      FRACTURE SURGERY      right hip     HERNIA REPAIR      groin    HYSTERECTOMY      INSERTION OF IMPLANTABLE LOOP RECORDER N/A 12/12/2018    Procedure: Insertion, Implantable Loop Recorder;  Surgeon: Ashok Herbert MD;  Location: [facility] CATH LAB;  Service: Cardiology;  Laterality: N/A;    PERCUTANEOUS PINNING OF HIP Left 3/23/2019    Procedure: PINNING, HIP, PERCUTANEOUS;  Surgeon: Jorje Hamlin MD;  Location: [facility] OR;  Service: Orthopedics;  Laterality: Left;    VARICOSE VEIN SURGERY         Review of patient's allergies indicates:   Allergen Reactions    Carvedilol Other (See Comments)     Bradycardia and syncope    Boniva [ibandronate]     Codeine     Hydralazine analogues     Iodinated contrast media Hives    Kionex [sodium polystyrene sulfonate] Diarrhea     From encounter 12/11/17 for diarrhea--not true allergy.    Lisinopril     Morphine        No current facility-administered medications on file prior  to encounter.      Current Outpatient Medications on File Prior to Encounter   Medication Sig    acetaminophen (TYLENOL) 325 MG tablet Take 2 tablets (650 mg total) by mouth every 4 (four) hours as needed. (Patient taking differently: Take 650 mg by mouth every 4 (four) hours as needed for Pain. )    albuterol-ipratropium (DUO-NEB) 2.5 mg-0.5 mg/3 mL nebulizer solution Take 3 mLs by nebulization every 6 (six) hours as needed for Wheezing. Rescue    amLODIPine (NORVASC) 5 MG tablet Take 1 tablet (5 mg total) by mouth 2 (two) times daily.    apixaban (ELIQUIS) 5 mg Tab Take 1 tablet (5 mg total) by mouth 2 (two) times daily.    ascorbic acid (VITAMIN C) 500 MG tablet Take 500 mg by mouth once daily.      aspirin (ECOTRIN) 81 MG EC tablet Take 81 mg by mouth once daily.      calcium carbonate (OS-TASIA) 500 mg calcium (1,250 mg) tablet Take 1 tablet by mouth 2 (two) times daily.      cyanocobalamin/folic acid (FOLTRATE ORAL) vitamin B12-folic acid   2.5 - 25-2MG    docusate sodium (COLACE) 100 MG capsule Take 1 capsule (100 mg total) by mouth 2 (two) times daily.    ferrous sulfate (FEOSOL) 325 mg (65 mg iron) Tab tablet Take 1 tablet (325 mg total) by mouth 2 (two) times daily with meals.    fish oil-omega-3 fatty acids 300-1,000 mg capsule Take 2 g by mouth every evening.     fluocinolone (SYNALAR) 0.01 % external solution Apply topically 2 (two) times daily.    fluticasone (FLONASE) 50 mcg/actuation nasal spray 2 sprays by Each Nare route once daily.    fluticasone-umeclidin-vilanter (TRELEGY ELLIPTA) 100-62.5-25 mcg DsDv Inhale 1 puff into the lungs once daily.    folic acid-vit B6-vit B12 2.5-25-2 mg (FOLBIC) 2.5-25-2 mg Tab Take 1 tablet by mouth once daily.    furosemide (LASIX) 20 MG tablet Take 1 tablet only as needed, for swelling, increase SOB, weight gain of 3 lbs overnight or 5 lbs for the week    gabapentin (NEURONTIN) 300 MG capsule Take 1 capsule (300 mg total) by mouth every evening.     levothyroxine (SYNTHROID) 88 MCG tablet Take 88 mcg by mouth once daily.    lidocaine (LIDODERM) 5 % Place 1 patch onto the skin once daily. Remove & Discard patch within 12 hours or as directed by MD    magnesium oxide (MAG-OX) 400 mg (241.3 mg magnesium) tablet TAKE 1 TABLET BY MOUTH ONCE DAILY    montelukast (SINGULAIR) 10 mg tablet TAKE 1 TABLET BY MOUTH ONCE DAILY IN THE EVENING    multivitamin (THERAGRAN) tablet Take 1 tablet by mouth once daily.    mupirocin (BACTROBAN) 2 % ointment Apply topically 2 (two) times daily.    nitroGLYCERIN (NITROSTAT) 0.4 MG SL tablet Place 1 tablet (0.4 mg total) under the tongue every 5 (five) minutes as needed for Chest pain. As needed (Patient taking differently: Place 0.4 mg under the tongue every 5 (five) minutes as needed for Chest pain. Seek medical help if pain is not relieved after the third dose.)    predniSONE (DELTASONE) 20 MG tablet Take one daily for 3 days and repeat if needed.    sertraline (ZOLOFT) 25 MG tablet Take 1 tablet (25 mg total) by mouth once daily.    simvastatin (ZOCOR) 40 MG tablet TAKE 1 TABLET BY MOUTH ONCE DAILY IN THE EVENING    traMADoL (ULTRAM) 50 mg tablet Take 1 tablet (50 mg total) by mouth every 8 (eight) hours as needed for Pain.    vitamin D 1000 units Tab Take 1 tablet (1,000 Units total) by mouth once daily.     Family History     Problem Relation (Age of Onset)    Diabetes Sister, Brother    Heart disease Mother    Hypertension Mother    Kidney disease Son    Lung disease Father        Tobacco Use    Smoking status: Former Smoker     Packs/day: 0.35     Years: 44.00     Pack years: 15.40     Types: Cigarettes     Last attempt to quit: 2015     Years since quittin.0    Smokeless tobacco: Never Used    Tobacco comment: 2015   Substance and Sexual Activity    Alcohol use: No     Alcohol/week: 0.0 standard drinks     Frequency: Never     Binge frequency: Never    Drug use: No    Sexual activity: Never      Review of Systems   Constitutional: Positive for appetite change and fatigue.   Neurological: Positive for dizziness and weakness.   Hematological: Bruises/bleeds easily.   All other systems reviewed and are negative.    Objective:     Vital Signs (Most Recent):  Temp: 97.7 °F (36.5 °C) (05/07/20 1702)  Pulse: 60 (05/07/20 1702)  Resp: 18 (05/07/20 1702)  SpO2: 97 % (05/07/20 1702) Vital Signs (24h Range):  Temp:  [97.7 °F (36.5 °C)-98.1 °F (36.7 °C)] 97.7 °F (36.5 °C)  Pulse:  [60-66] 60  Resp:  [18] 18  SpO2:  [95 %-97 %] 97 %  BP: (132)/(58) 132/58     Weight: 56 kg (123 lb 7.3 oz)  Body mass index is 21.19 kg/m².    Physical Exam   Constitutional: She is oriented to person, place, and time. She appears well-developed.   HENT:   Head: Normocephalic and atraumatic.   Eyes: Pupils are equal, round, and reactive to light. Conjunctivae are normal.   Neck: Neck supple. No JVD present. No thyromegaly present.   Cardiovascular: Normal rate and regular rhythm. Exam reveals no gallop and no friction rub.   No murmur heard.  Pulmonary/Chest: Effort normal and breath sounds normal.   Abdominal: Soft. Bowel sounds are normal. She exhibits no distension and no mass. There is no tenderness.   Musculoskeletal: Normal range of motion. She exhibits no edema.   Neurological: She is oriented to person, place, and time. No cranial nerve deficit.   Able to stand with assistance and walk.    Skin: Skin is warm and dry.   Psychiatric: Her behavior is normal.         CRANIAL NERVES     CN III, IV, VI   Pupils are equal, round, and reactive to light.       Significant Labs:   CBC:   Recent Labs   Lab 05/05/20 2317 05/07/20  1736   WBC 9.58 8.47   HGB 9.3* 9.7*   HCT 28.9* 30.2*    166     CMP:   Recent Labs   Lab 05/05/20 2317 05/07/20  1736    140   K 4.2 4.1    107   CO2 25 22*   GLU 91 66*   BUN 71* 64*   CREATININE 1.9* 1.5*   CALCIUM 9.1 9.2   PROT 6.4  --    ALBUMIN 3.4*  --    BILITOT 0.2  --    ALKPHOS 72   --    AST 24  --    ALT 23  --    ANIONGAP 9 11   EGFRNONAA 23* 31*     Magnesium: No results for input(s): MG in the last 48 hours.  Urine Studies:   Recent Labs   Lab 05/06/20  0036   COLORU Yellow   APPEARANCEUA Clear   PHUR 6.0   SPECGRAV <=1.005*   PROTEINUA 1+*   GLUCUA Negative   KETONESU Negative   BILIRUBINUA Negative   OCCULTUA Negative   NITRITE Negative   UROBILINOGEN Negative   LEUKOCYTESUR 2+*   RBCUA 0   WBCUA 4   BACTERIA None   SQUAMEPITHEL 5   HYALINECASTS 0       Significant Imaging:  CT head without contrast (05-):  1. No acute intracranial process.  2. Involutional changes with chronic microvascular ischemic changes and small remote left inferior cerebellar lacunar infarct.    CXR: No acute radiographic abnormality with no significant change.    US arterial doppler left lower extremities:   1. Ankle-brachial index of 0.7 on the right and 0.8 on the left, indicative of mild-to-moderate peripheral arterial disease.  2. On the left, suspect stenoses in the proximal and distal SFA up to 50% (not directly visualized).  There is flow within all 3 trifurcation vessels in the calf.    Left foot x-ray:  Chronic findings in the foot with degenerative change most severe at the talonavicular joint.

## 2020-05-07 NOTE — ASSESSMENT & PLAN NOTE
Check TSH. Chronic problem. Will continue chronic medications and monitor for any changes, adjusting as needed.

## 2020-05-07 NOTE — TELEPHONE ENCOUNTER
I spoke to the patient's daughter and scheduled her for this afternoon with GABRIELA Omalley for an ER f/u.

## 2020-05-07 NOTE — HPI
"Patient is a 59-year-old female with past medical history significant for HTN, COPD, T2DM, hx of PE, aortic aneurysm, CHF, CKD 3-4, and anemia is being admitted to Hospital Medicine under observation from [facility] Emergency room with complaints of dizziness for 1 month. The patient states that she had an episode of dizziness 1 night ago, but states that she has been dealing with lightheadedness and dizziness for the last month. Patient states that she suffered this bout of dizziness when she was walking with her walker last night, but states that it subsided.  Patient denies any ill hearing difficulties.  No nausea or vomiting reported.  No new visual changes reported.  She states that "It's like a swirling for me." She also admits that she has some lightheadedness from sitting to standing. Patient also endorses having loss of appetite and some fatigue for the last 3 days. She also states that she saw her PCP today, who recommended she come to the ED for further evaluation.  Patient was seen in the ER 2 days earlier for similar complaints as well.  The patient denies headache, fever, chest pain, numbness, weakness, abdominal pain, SOB, or any other complaint at this time. The patient has a PSHx of hysterectomy, appendectomy, fracture surgery, and hernia repair.   "

## 2020-05-07 NOTE — TELEPHONE ENCOUNTER
----- Message from Ree Hunter sent at 5/7/2020  2:57 PM CDT -----  Name of Caller- Daughter- Colleen Guerra   Reason for Visit/Symptoms- leg pain, pt can barely walk   Best Contact Number or Confirm if Mychart Preferred-#[phone]  Preferred Date/Time of Appointment  Interested in Virtual Visit (yes/no)  Additional Information- Patient went to [facility] hospital ER Tuesday,pt is not feeling better.

## 2020-05-08 LAB
ALBUMIN SERPL BCP-MCNC: 3.1 G/DL (ref 3.5–5.2)
ALP SERPL-CCNC: 55 U/L (ref 55–135)
ALT SERPL W/O P-5'-P-CCNC: 16 U/L (ref 10–44)
ANION GAP SERPL CALC-SCNC: 8 MMOL/L (ref 8–16)
AORTIC ROOT ANNULUS: 2.65 CM
AORTIC VALVE CUSP SEPERATION: 1.67 CM
ASCENDING AORTA: 3.13 CM
AST SERPL-CCNC: 20 U/L (ref 10–40)
AV INDEX (PROSTH): 0.76
AV MEAN GRADIENT: 7 MMHG
AV PEAK GRADIENT: 11 MMHG
AV VALVE AREA: 2.47 CM2
AV VELOCITY RATIO: 0.79
BASOPHILS # BLD AUTO: 0.04 K/UL (ref 0–0.2)
BASOPHILS NFR BLD: 0.6 % (ref 0–1.9)
BILIRUB SERPL-MCNC: 0.3 MG/DL (ref 0.1–1)
BSA FOR ECHO PROCEDURE: 1.57 M2
BUN SERPL-MCNC: 53 MG/DL (ref 8–23)
CALCIUM SERPL-MCNC: 8.9 MG/DL (ref 8.7–10.5)
CHLORIDE SERPL-SCNC: 108 MMOL/L (ref 95–110)
CO2 SERPL-SCNC: 25 MMOL/L (ref 23–29)
CREAT SERPL-MCNC: 1.4 MG/DL (ref 0.5–1.4)
CV ECHO LV RWT: 0.76 CM
DIFFERENTIAL METHOD: ABNORMAL
DOP CALC AO PEAK VEL: 1.65 M/S
DOP CALC AO VTI: 42.13 CM
DOP CALC LVOT AREA: 3.2 CM2
DOP CALC LVOT DIAMETER: 2.03 CM
DOP CALC LVOT PEAK VEL: 1.3 M/S
DOP CALC LVOT STROKE VOLUME: 104.23 CM3
DOP CALCLVOT PEAK VEL VTI: 32.22 CM
E WAVE DECELERATION TIME: 293.06 MSEC
E/A RATIO: 0.82
E/E' RATIO: 30.2 M/S
ECHO LV POSTERIOR WALL: 1.44 CM (ref 0.6–1.1)
EOSINOPHIL # BLD AUTO: 0.3 K/UL (ref 0–0.5)
EOSINOPHIL NFR BLD: 4.1 % (ref 0–8)
ERYTHROCYTE [DISTWIDTH] IN BLOOD BY AUTOMATED COUNT: 13.5 % (ref 11.5–14.5)
EST. GFR  (AFRICAN AMERICAN): 38 ML/MIN/1.73 M^2
EST. GFR  (NON AFRICAN AMERICAN): 33 ML/MIN/1.73 M^2
ESTIMATED AVG GLUCOSE: 117 MG/DL (ref 68–131)
FOLATE SERPL-MCNC: >40 NG/ML (ref 4–24)
FRACTIONAL SHORTENING: 35 % (ref 28–44)
GLUCOSE SERPL-MCNC: 72 MG/DL (ref 70–110)
HBA1C MFR BLD HPLC: 5.7 % (ref 4–5.6)
HCT VFR BLD AUTO: 28.2 % (ref 37–48.5)
HGB BLD-MCNC: 8.9 G/DL (ref 12–16)
IMM GRANULOCYTES # BLD AUTO: 0.06 K/UL (ref 0–0.04)
IMM GRANULOCYTES NFR BLD AUTO: 0.9 % (ref 0–0.5)
INTERVENTRICULAR SEPTUM: 1.24 CM (ref 0.6–1.1)
IRON SERPL-MCNC: 32 UG/DL (ref 30–160)
IVRT: 125.59 MSEC
LA MAJOR: 7.09 CM
LA MINOR: 7.09 CM
LA WIDTH: 4.28 CM
LEFT ATRIUM SIZE: 4.32 CM
LEFT ATRIUM VOLUME INDEX: 70.5 ML/M2
LEFT ATRIUM VOLUME: 111.43 CM3
LEFT INTERNAL DIMENSION IN SYSTOLE: 2.47 CM (ref 2.1–4)
LEFT VENTRICLE DIASTOLIC VOLUME INDEX: 39.43 ML/M2
LEFT VENTRICLE DIASTOLIC VOLUME: 62.3 ML
LEFT VENTRICLE MASS INDEX: 115 G/M2
LEFT VENTRICLE SYSTOLIC VOLUME INDEX: 13.7 ML/M2
LEFT VENTRICLE SYSTOLIC VOLUME: 21.71 ML
LEFT VENTRICULAR INTERNAL DIMENSION IN DIASTOLE: 3.81 CM (ref 3.5–6)
LEFT VENTRICULAR MASS: 182.02 G
LV LATERAL E/E' RATIO: 25.17 M/S
LV SEPTAL E/E' RATIO: 37.75 M/S
LYMPHOCYTES # BLD AUTO: 2 K/UL (ref 1–4.8)
LYMPHOCYTES NFR BLD: 30.2 % (ref 18–48)
MCH RBC QN AUTO: 30.9 PG (ref 27–31)
MCHC RBC AUTO-ENTMCNC: 31.6 G/DL (ref 32–36)
MCV RBC AUTO: 98 FL (ref 82–98)
MONOCYTES # BLD AUTO: 0.9 K/UL (ref 0.3–1)
MONOCYTES NFR BLD: 13 % (ref 4–15)
MV A" WAVE DURATION": 168 MSEC
MV MEAN GRADIENT: 5 MMHG
MV PEAK A VEL: 1.84 M/S
MV PEAK E VEL: 1.51 M/S
MV STENOSIS PRESSURE HALF TIME: 107 MS
MV VALVE AREA P 1/2 METHOD: 2.06 CM2
NEUTROPHILS # BLD AUTO: 3.4 K/UL (ref 1.8–7.7)
NEUTROPHILS NFR BLD: 51.2 % (ref 38–73)
NRBC BLD-RTO: 0 /100 WBC
PISA TR MAX VEL: 3.43 M/S
PLATELET # BLD AUTO: 176 K/UL (ref 150–350)
PMV BLD AUTO: 11.3 FL (ref 9.2–12.9)
POCT GLUCOSE: 131 MG/DL (ref 70–110)
POCT GLUCOSE: 138 MG/DL (ref 70–110)
POCT GLUCOSE: 149 MG/DL (ref 70–110)
POTASSIUM SERPL-SCNC: 4.2 MMOL/L (ref 3.5–5.1)
PROT SERPL-MCNC: 5.8 G/DL (ref 6–8.4)
PULM VEIN A" WAVE DURATION": 103 MSEC
PULM VEIN S/D RATIO: 1.23
PV PEAK D VEL: 0.43 M/S
PV PEAK S VEL: 0.53 M/S
PV PEAK VELOCITY: 0.71 CM/S
RA MAJOR: 5.18 CM
RA PRESSURE: 3 MMHG
RA WIDTH: 3.46 CM
RBC # BLD AUTO: 2.88 M/UL (ref 4–5.4)
RIGHT VENTRICULAR END-DIASTOLIC DIMENSION: 3.28 CM
SATURATED IRON: 11 % (ref 20–50)
SINUS: 2.7 CM
SODIUM SERPL-SCNC: 141 MMOL/L (ref 136–145)
STJ: 2.77 CM
TDI LATERAL: 0.06 M/S
TDI SEPTAL: 0.04 M/S
TDI: 0.05 M/S
TOTAL IRON BINDING CAPACITY: 300 UG/DL (ref 250–450)
TR MAX PG: 47 MMHG
TRANSFERRIN SERPL-MCNC: 203 MG/DL (ref 200–375)
TRICUSPID ANNULAR PLANE SYSTOLIC EXCURSION: 3.29 CM
TROPONIN I SERPL DL<=0.01 NG/ML-MCNC: <0.006 NG/ML (ref 0–0.03)
TSH SERPL DL<=0.005 MIU/L-ACNC: 0.89 UIU/ML (ref 0.4–4)
TV REST PULMONARY ARTERY PRESSURE: 50 MMHG
VIT B12 SERPL-MCNC: >2000 PG/ML (ref 210–950)
WBC # BLD AUTO: 6.6 K/UL (ref 3.9–12.7)

## 2020-05-08 PROCEDURE — 83036 HEMOGLOBIN GLYCOSYLATED A1C: CPT

## 2020-05-08 PROCEDURE — 36415 COLL VENOUS BLD VENIPUNCTURE: CPT

## 2020-05-08 PROCEDURE — 97161 PT EVAL LOW COMPLEX 20 MIN: CPT

## 2020-05-08 PROCEDURE — 85025 COMPLETE CBC W/AUTO DIFF WBC: CPT

## 2020-05-08 PROCEDURE — 80053 COMPREHEN METABOLIC PANEL: CPT

## 2020-05-08 PROCEDURE — G0378 HOSPITAL OBSERVATION PER HR: HCPCS

## 2020-05-08 PROCEDURE — 99900035 HC TECH TIME PER 15 MIN (STAT)

## 2020-05-08 PROCEDURE — 84484 ASSAY OF TROPONIN QUANT: CPT

## 2020-05-08 PROCEDURE — 84443 ASSAY THYROID STIM HORMONE: CPT

## 2020-05-08 PROCEDURE — 25000003 PHARM REV CODE 250: Performed by: INTERNAL MEDICINE

## 2020-05-08 RX ADMIN — OMEGA-3 FATTY ACIDS CAP 1000 MG 1 CAPSULE: 1000 CAP at 08:05

## 2020-05-08 RX ADMIN — Medication 400 MG: at 08:05

## 2020-05-08 RX ADMIN — PANTOPRAZOLE SODIUM 40 MG: 40 TABLET, DELAYED RELEASE ORAL at 08:05

## 2020-05-08 RX ADMIN — MONTELUKAST 10 MG: 10 TABLET, FILM COATED ORAL at 09:05

## 2020-05-08 RX ADMIN — ASPIRIN 81 MG: 81 TABLET, COATED ORAL at 08:05

## 2020-05-08 RX ADMIN — APIXABAN 5 MG: 2.5 TABLET, FILM COATED ORAL at 09:05

## 2020-05-08 RX ADMIN — DOCUSATE SODIUM 100 MG: 100 CAPSULE, LIQUID FILLED ORAL at 08:05

## 2020-05-08 RX ADMIN — THERA TABS 1 TABLET: TAB at 08:05

## 2020-05-08 RX ADMIN — CALCIUM 500 MG: 500 TABLET ORAL at 08:05

## 2020-05-08 RX ADMIN — DOCUSATE SODIUM 100 MG: 100 CAPSULE, LIQUID FILLED ORAL at 09:05

## 2020-05-08 RX ADMIN — SIMVASTATIN 40 MG: 40 TABLET, FILM COATED ORAL at 09:05

## 2020-05-08 RX ADMIN — FOLIC ACID-PYRIDOXINE-CYANOCOBALAMIN TAB 2.5-25-2 MG 1 TABLET: 2.5-25-2 TAB at 08:05

## 2020-05-08 RX ADMIN — FERROUS SULFATE TAB EC 325 MG (65 MG FE EQUIVALENT) 325 MG: 325 (65 FE) TABLET DELAYED RESPONSE at 08:05

## 2020-05-08 RX ADMIN — AMLODIPINE BESYLATE 5 MG: 5 TABLET ORAL at 08:05

## 2020-05-08 RX ADMIN — LEVOTHYROXINE SODIUM 88 MCG: 88 TABLET ORAL at 05:05

## 2020-05-08 RX ADMIN — SERTRALINE HYDROCHLORIDE 25 MG: 25 TABLET ORAL at 08:05

## 2020-05-08 RX ADMIN — CALCIUM 500 MG: 500 TABLET ORAL at 09:05

## 2020-05-08 RX ADMIN — OXYCODONE HYDROCHLORIDE AND ACETAMINOPHEN 500 MG: 500 TABLET ORAL at 08:05

## 2020-05-08 RX ADMIN — APIXABAN 5 MG: 2.5 TABLET, FILM COATED ORAL at 08:05

## 2020-05-08 RX ADMIN — AMLODIPINE BESYLATE 5 MG: 5 TABLET ORAL at 09:05

## 2020-05-08 NOTE — H&P
"[facility] Hospital Medicine  History & Physical    Patient Name: Blaire Cage  MRN: [id number]  Admission Date: 5/7/2020  Attending Physician: Adán Huston MD   Primary Care Provider: Eli Edmonds MD         Patient information was obtained from patient and ER records.     Subjective:     Principal Problem:Dizziness    Chief Complaint:   Chief Complaint   Patient presents with    Dizziness    Fatigue        HPI: Patient is a 59-year-old female with past medical history significant for HTN, COPD, T2DM, hx of PE, aortic aneurysm, CHF, CKD 3-4, and anemia is being admitted to Hospital Medicine under observation from [facility] Emergency room with complaints of dizziness for 1 month. The patient states that she had an episode of dizziness 1 night ago, but states that she has been dealing with lightheadedness and dizziness for the last month. Patient states that she suffered this bout of dizziness when she was walking with her walker last night, but states that it subsided.  Patient denies any ill hearing difficulties.  No nausea or vomiting reported.  No new visual changes reported.  She states that "It's like a swirling for me." She also admits that she has some lightheadedness from sitting to standing. Patient also endorses having loss of appetite and some fatigue for the last 3 days. She also states that she saw her PCP today, who recommended she come to the ED for further evaluation.  Patient was seen in the ER 2 days earlier for similar complaints as well.  The patient denies headache, fever, chest pain, numbness, weakness, abdominal pain, SOB, or any other complaint at this time. The patient has a PSHx of hysterectomy, appendectomy, fracture surgery, and hernia repair.     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 8/7/2018    Hip arthritis 3/1/2016    Homocysteinemia 8/7/2018    Hyperlipidemia     Hypertension     Normocytic normochromic anemia 6/29/2018    PE (pulmonary thromboembolism) 06/09/2018    Pneumonia of right lower lobe due to infectious organism 9/11/2017    Thyroid disease     hypothyroid    Type 2 diabetes mellitus with stage 3 chronic kidney disease 1/18/2013       Past Surgical History:   Procedure Laterality Date    APPENDECTOMY      CHOLECYSTECTOMY      FRACTURE SURGERY      right hip     HERNIA REPAIR      groin    HYSTERECTOMY      INSERTION OF IMPLANTABLE LOOP RECORDER N/A 12/12/2018    Procedure: Insertion, Implantable Loop Recorder;  Surgeon: Ashok Herbert MD;  Location: [facility] CATH LAB;  Service: Cardiology;  Laterality: N/A;    PERCUTANEOUS PINNING OF HIP Left 3/23/2019    Procedure: PINNING, HIP, PERCUTANEOUS;  Surgeon: Jorje Hamlin MD;  Location: [facility] OR;  Service: Orthopedics;  Laterality: Left;    VARICOSE VEIN SURGERY         Review of patient's allergies indicates:   Allergen Reactions    Carvedilol Other (See Comments)     Bradycardia and syncope    Boniva [ibandronate]     Codeine     Hydralazine analogues     Iodinated contrast media Hives    Kionex [sodium polystyrene sulfonate] Diarrhea     From encounter 12/11/17 for diarrhea--not true allergy.    Lisinopril     Morphine        No current facility-administered medications on file prior to encounter.      Current Outpatient Medications on File Prior to Encounter   Medication Sig    acetaminophen (TYLENOL) 325 MG tablet Take 2 tablets (650 mg total) by mouth every 4 (four)  hours as needed. (Patient taking differently: Take 650 mg by mouth every 4 (four) hours as needed for Pain. )    albuterol-ipratropium (DUO-NEB) 2.5 mg-0.5 mg/3 mL nebulizer solution Take 3 mLs by nebulization every 6 (six) hours as needed for Wheezing. Rescue    amLODIPine (NORVASC) 5 MG tablet Take 1 tablet (5 mg total) by mouth 2 (two) times daily.    apixaban (ELIQUIS) 5 mg Tab Take 1 tablet (5 mg total) by mouth 2 (two) times daily.    ascorbic acid (VITAMIN C) 500 MG tablet Take 500 mg by mouth once daily.      aspirin (ECOTRIN) 81 MG EC tablet Take 81 mg by mouth once daily.      calcium carbonate (OS-TASIA) 500 mg calcium (1,250 mg) tablet Take 1 tablet by mouth 2 (two) times daily.      cyanocobalamin/folic acid (FOLTRATE ORAL) vitamin B12-folic acid   2.5 - 25-2MG    docusate sodium (COLACE) 100 MG capsule Take 1 capsule (100 mg total) by mouth 2 (two) times daily.    ferrous sulfate (FEOSOL) 325 mg (65 mg iron) Tab tablet Take 1 tablet (325 mg total) by mouth 2 (two) times daily with meals.    fish oil-omega-3 fatty acids 300-1,000 mg capsule Take 2 g by mouth every evening.     fluocinolone (SYNALAR) 0.01 % external solution Apply topically 2 (two) times daily.    fluticasone (FLONASE) 50 mcg/actuation nasal spray 2 sprays by Each Nare route once daily.    fluticasone-umeclidin-vilanter (TRELEGY ELLIPTA) 100-62.5-25 mcg DsDv Inhale 1 puff into the lungs once daily.    folic acid-vit B6-vit B12 2.5-25-2 mg (FOLBIC) 2.5-25-2 mg Tab Take 1 tablet by mouth once daily.    furosemide (LASIX) 20 MG tablet Take 1 tablet only as needed, for swelling, increase SOB, weight gain of 3 lbs overnight or 5 lbs for the week    gabapentin (NEURONTIN) 300 MG capsule Take 1 capsule (300 mg total) by mouth every evening.    levothyroxine (SYNTHROID) 88 MCG tablet Take 88 mcg by mouth once daily.    lidocaine (LIDODERM) 5 % Place 1 patch onto the skin once daily. Remove & Discard patch within 12 hours or as  directed by MD    magnesium oxide (MAG-OX) 400 mg (241.3 mg magnesium) tablet TAKE 1 TABLET BY MOUTH ONCE DAILY    montelukast (SINGULAIR) 10 mg tablet TAKE 1 TABLET BY MOUTH ONCE DAILY IN THE EVENING    multivitamin (THERAGRAN) tablet Take 1 tablet by mouth once daily.    mupirocin (BACTROBAN) 2 % ointment Apply topically 2 (two) times daily.    nitroGLYCERIN (NITROSTAT) 0.4 MG SL tablet Place 1 tablet (0.4 mg total) under the tongue every 5 (five) minutes as needed for Chest pain. As needed (Patient taking differently: Place 0.4 mg under the tongue every 5 (five) minutes as needed for Chest pain. Seek medical help if pain is not relieved after the third dose.)    predniSONE (DELTASONE) 20 MG tablet Take one daily for 3 days and repeat if needed.    sertraline (ZOLOFT) 25 MG tablet Take 1 tablet (25 mg total) by mouth once daily.    simvastatin (ZOCOR) 40 MG tablet TAKE 1 TABLET BY MOUTH ONCE DAILY IN THE EVENING    traMADoL (ULTRAM) 50 mg tablet Take 1 tablet (50 mg total) by mouth every 8 (eight) hours as needed for Pain.    vitamin D 1000 units Tab Take 1 tablet (1,000 Units total) by mouth once daily.     Family History     Problem Relation (Age of Onset)    Diabetes Sister, Brother    Heart disease Mother    Hypertension Mother    Kidney disease Son    Lung disease Father        Tobacco Use    Smoking status: Former Smoker     Packs/day: 0.35     Years: 44.00     Pack years: 15.40     Types: Cigarettes     Last attempt to quit: 2015     Years since quittin.0    Smokeless tobacco: Never Used    Tobacco comment: 2015   Substance and Sexual Activity    Alcohol use: No     Alcohol/week: 0.0 standard drinks     Frequency: Never     Binge frequency: Never    Drug use: No    Sexual activity: Never     Review of Systems   Constitutional: Positive for appetite change and fatigue.   Neurological: Positive for dizziness and weakness.   Hematological: Bruises/bleeds easily.   All other  systems reviewed and are negative.    Objective:     Vital Signs (Most Recent):  Temp: 97.7 °F (36.5 °C) (05/07/20 1702)  Pulse: 60 (05/07/20 1702)  Resp: 18 (05/07/20 1702)  SpO2: 97 % (05/07/20 1702) Vital Signs (24h Range):  Temp:  [97.7 °F (36.5 °C)-98.1 °F (36.7 °C)] 97.7 °F (36.5 °C)  Pulse:  [60-66] 60  Resp:  [18] 18  SpO2:  [95 %-97 %] 97 %  BP: (132)/(58) 132/58     Weight: 56 kg (123 lb 7.3 oz)  Body mass index is 21.19 kg/m².    Physical Exam   Constitutional: She is oriented to person, place, and time. She appears well-developed.   HENT:   Head: Normocephalic and atraumatic.   Eyes: Pupils are equal, round, and reactive to light. Conjunctivae are normal.   Neck: Neck supple. No JVD present. No thyromegaly present.   Cardiovascular: Normal rate and regular rhythm. Exam reveals no gallop and no friction rub.   No murmur heard.  Pulmonary/Chest: Effort normal and breath sounds normal.   Abdominal: Soft. Bowel sounds are normal. She exhibits no distension and no mass. There is no tenderness.   Musculoskeletal: Normal range of motion. She exhibits no edema.   Neurological: She is oriented to person, place, and time. No cranial nerve deficit.   Able to stand with assistance and walk.    Skin: Skin is warm and dry.   Psychiatric: Her behavior is normal.         CRANIAL NERVES     CN III, IV, VI   Pupils are equal, round, and reactive to light.       Significant Labs:   CBC:   Recent Labs   Lab 05/05/20 2317 05/07/20  1736   WBC 9.58 8.47   HGB 9.3* 9.7*   HCT 28.9* 30.2*    166     CMP:   Recent Labs   Lab 05/05/20 2317 05/07/20  1736    140   K 4.2 4.1    107   CO2 25 22*   GLU 91 66*   BUN 71* 64*   CREATININE 1.9* 1.5*   CALCIUM 9.1 9.2   PROT 6.4  --    ALBUMIN 3.4*  --    BILITOT 0.2  --    ALKPHOS 72  --    AST 24  --    ALT 23  --    ANIONGAP 9 11   EGFRNONAA 23* 31*     Magnesium: No results for input(s): MG in the last 48 hours.  Urine Studies:   Recent Labs   Lab 05/06/20  0036    COLORU Yellow   APPEARANCEUA Clear   PHUR 6.0   SPECGRAV <=1.005*   PROTEINUA 1+*   GLUCUA Negative   KETONESU Negative   BILIRUBINUA Negative   OCCULTUA Negative   NITRITE Negative   UROBILINOGEN Negative   LEUKOCYTESUR 2+*   RBCUA 0   WBCUA 4   BACTERIA None   SQUAMEPITHEL 5   HYALINECASTS 0       Significant Imaging:  CT head without contrast (05-):  1. No acute intracranial process.  2. Involutional changes with chronic microvascular ischemic changes and small remote left inferior cerebellar lacunar infarct.    CXR: No acute radiographic abnormality with no significant change.    US arterial doppler left lower extremities:   1. Ankle-brachial index of 0.7 on the right and 0.8 on the left, indicative of mild-to-moderate peripheral arterial disease.  2. On the left, suspect stenoses in the proximal and distal SFA up to 50% (not directly visualized).  There is flow within all 3 trifurcation vessels in the calf.    Left foot x-ray:  Chronic findings in the foot with degenerative change most severe at the talonavicular joint.    Assessment/Plan:     * Dizziness  Non-specific. Rule out organic brain condition.  Obtain MRI brain.  Consult neurologist.  Check TSH, RPR, B12, Folic acid.  Fall precautions.  PT evaluation.   Check orthostatis      Type 2 diabetes mellitus with stage 4 chronic kidney disease  Check blood glucose level q AC/HS.  Use Novolog Insulin Sliding Scale as needed.   Continue American Diabetic Association 1800 Kcal diet.        Hyperlipidemia associated with type 2 diabetes mellitus  Chronic problem. Will continue chronic medications and monitor for any changes, adjusting as needed.          Hypertension associated with diabetes  Chronic problem. Will continue chronic medications and monitor for any changes, adjusting as needed.          Hypothyroidism  Check TSH. Chronic problem. Will continue chronic medications and monitor for any changes, adjusting as needed.          Diabetic  neuropathy, type II diabetes mellitus  Check B12, Folic acid, TSH and thiamine level.  Fall precautions and PT evaluation in am.        VTE Risk Mitigation (From admission, onward)         Ordered     apixaban tablet 5 mg  2 times daily      05/07/20 1840                   Adán Huston MD  Department of Hospital Medicine   [facility]

## 2020-05-08 NOTE — PT/OT/SLP EVAL
Physical Therapy Evaluation    Patient Name:  Blaire Cage   MRN:  [id number]    Recommendations:     Discharge Recommendations:  home, home health PT, home with home health   Discharge Equipment Recommendations: walker, rolling   Barriers to discharge: None    Assessment:     Blaire Cage is a 89 y.o. female admitted with a medical diagnosis of Dizziness.  She presents with the following impairments/functional limitations:  weakness, impaired endurance, impaired functional mobilty, gait instability, pain, impaired balance, impaired cardiopulmonary response to activity, decreased lower extremity function  .    Rehab Prognosis: Good and Fair; patient would benefit from acute skilled PT services to address these deficits and reach maximum level of function.    Recent Surgery: * No surgery found *      Plan:     During this hospitalization, patient to be seen 6 x/week to address the identified rehab impairments via gait training, therapeutic activities, therapeutic exercises and progress toward the following goals:    · Plan of Care Expires:  05/31/20    Subjective     Chief Complaint: left foot pain  Patient/Family Comments/goals: none stated  Pain/Comfort:  · Pain Rating 1: 6/10  · Location - Side 1: Left  · Location 1: foot  · Pain Addressed 1: Cessation of Activity, Reposition, Nurse notified  · Pain Rating Post-Intervention 1: 6/10    Living Environment:  House with 0 steps to enter  Prior to admission, patients level of function was household ambulation with rollator.  Equipment used at home: rollator.  DME owned (not currently used): rollator.  Upon discharge, patient will have assistance from daughters.    Objective:     Communicated with nurse (Marie) prior to session.  Patient found HOB elevated with peripheral IV  upon PT entry to room.    General Precautions: Standard, fall   Orthopedic Precautions:(pain left foot with wt bearing)   Braces: N/A     Functional Mobility:  · Bed Mobility:     · Rolling  Right: stand by assistance and with HOB elevates  · Supine to Sit: stand by assistance and with HOB elevated  · Transfers:     · Sit to Stand:  contact guard assistance and minimum assistance with rolling walker and  x 3 reps  · Gait: N/T due to left foot pain      Therapeutic Activities and Exercises:   stood with RW x ~ 10-15 sec  X 3 reps NWB LLE with min A and cues  SUPINE: SLR x 10 reps x 2 sets each    AM-PAC 6 CLICK MOBILITY  Total Score:12     Patient left HOB elevated with bed alarm on.    GOALS:   Multidisciplinary Problems     Physical Therapy Goals        Problem: Physical Therapy Goal    Goal Priority Disciplines Outcome Goal Variances Interventions   Physical Therapy Goal     PT, PT/OT Ongoing, Progressing     Description:  Goals to be met by: 2020     Patient will increase functional independence with mobility by performin). Supine to sit with Modified [place]  2). Sit to supine with Modified [place]  3). Sit to stand transfer with Modified [place]  4). Bed to chair transfer with Modified [place] using Rolling Walker  5). Gait  X > 50  feet with Modified [place] using Rolling Walker.                       History:     Past Medical History:   Diagnosis Date    Anemia due to multiple mechanisms 2018    Anemia, chronic disease 2018    Anemia, chronic renal failure, stage 3 (moderate) 2018    Anticoagulant long-term use     aspirin    Aortic aneurysm     CHF (congestive heart failure)     COPD (chronic obstructive pulmonary disease)     COPD with acute exacerbation 2015    Diabetes mellitus type II     DVT (deep venous thrombosis) 2018    Encounter for blood transfusion     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 2018    Hip arthritis 3/1/2016    Homocysteinemia 2018    Hyperlipidemia     Hypertension     Normocytic normochromic anemia 2018    PE (pulmonary thromboembolism) 2018    Pneumonia of right lower lobe  due to infectious organism 9/11/2017    Thyroid disease     hypothyroid    Type 2 diabetes mellitus with stage 3 chronic kidney disease 1/18/2013       Past Surgical History:   Procedure Laterality Date    APPENDECTOMY      CHOLECYSTECTOMY      FRACTURE SURGERY      right hip     HERNIA REPAIR      groin    HYSTERECTOMY      INSERTION OF IMPLANTABLE LOOP RECORDER N/A 12/12/2018    Procedure: Insertion, Implantable Loop Recorder;  Surgeon: Ashok Herbert MD;  Location: [facility] CATH LAB;  Service: Cardiology;  Laterality: N/A;    PERCUTANEOUS PINNING OF HIP Left 3/23/2019    Procedure: PINNING, HIP, PERCUTANEOUS;  Surgeon: Jorje Hamlin MD;  Location: [facility] OR;  Service: Orthopedics;  Laterality: Left;    VARICOSE VEIN SURGERY         Time Tracking:     PT Received On: 05/08/20  PT Start Time: 0845     PT Stop Time: 0905  PT Total Time (min): 20 min     Billable Minutes: Evaluation 20      Osvaldo Rodrigues, PT  05/08/2020

## 2020-05-08 NOTE — SUBJECTIVE & OBJECTIVE
Interval History: Patient is still complaining of generalized weakness and unsteadiness. Blood sugars dropped this am down to 66 mg% without use of any Insulin. Patient reportes recent history of hypoglycemic readings. Oral intake is not consistent. Last week BS dropped down to 37 mg%. Patient denies any CP or SOB.     Review of Systems   Constitutional: Positive for appetite change and fatigue.   Neurological: Positive for dizziness and weakness.   Hematological: Bruises/bleeds easily.   All other systems reviewed and are negative.    Objective:     Vital Signs (Most Recent):  Temp: 97.5 °F (36.4 °C) (05/08/20 0728)  Pulse: (!) 57 (05/08/20 0728)  Resp: 18 (05/08/20 0728)  BP: (!) 144/82 (05/08/20 0728)  SpO2: (!) 94 % (05/08/20 0728) Vital Signs (24h Range):  Temp:  [97.1 °F (36.2 °C)-98.1 °F (36.7 °C)] 97.5 °F (36.4 °C)  Pulse:  [55-70] 57  Resp:  [18] 18  SpO2:  [94 %-98 %] 94 %  BP: (132-179)/(58-86) 144/82     Weight: 54.9 kg (121 lb)  Body mass index is 20.77 kg/m².    Intake/Output Summary (Last 24 hours) at 5/8/2020 0804  Last data filed at 5/8/2020 0400  Gross per 24 hour   Intake --   Output 1000 ml   Net -1000 ml      Physical Exam   Constitutional: She is oriented to person, place, and time. She appears well-developed.   HENT:   Head: Normocephalic and atraumatic.   Eyes: Pupils are equal, round, and reactive to light. Conjunctivae are normal.   Neck: Neck supple. No JVD present. No thyromegaly present.   Cardiovascular: Normal rate and regular rhythm. Exam reveals no gallop and no friction rub.   No murmur heard.  Pulmonary/Chest: Effort normal and breath sounds normal.   Abdominal: Soft. Bowel sounds are normal. She exhibits no distension and no mass. There is no tenderness.   Musculoskeletal: Normal range of motion. She exhibits no edema.   Neurological: She is oriented to person, place, and time. No cranial nerve deficit.   Able to stand with assistance and walk.    Skin: Skin is warm and dry.    Psychiatric: Her behavior is normal.       Significant Labs:   CBC:   Recent Labs   Lab 05/07/20  1736   WBC 8.47   HGB 9.7*   HCT 30.2*        CMP:   Recent Labs   Lab 05/07/20  1736      K 4.1      CO2 22*   GLU 66*   BUN 64*   CREATININE 1.5*   CALCIUM 9.2   ANIONGAP 11   EGFRNONAA 31*     TSH:   Recent Labs   Lab 12/14/19  1043   TSH 0.965       Significant Imaging:   CT head without contrast (05-):  1. No acute intracranial process.  2. Involutional changes with chronic microvascular ischemic changes and small remote left inferior cerebellar lacunar infarct.     CXR: No acute radiographic abnormality with no significant change.     US arterial doppler left lower extremities:   1. Ankle-brachial index of 0.7 on the right and 0.8 on the left, indicative of mild-to-moderate peripheral arterial disease.  2. On the left, suspect stenoses in the proximal and distal SFA up to 50% (not directly visualized).  There is flow within all 3 trifurcation vessels in the calf.     Left foot x-ray:  Chronic findings in the foot with degenerative change most severe at the talonavicular joint.

## 2020-05-08 NOTE — PROGRESS NOTES
"[facility] Medicine  Progress Note    Patient Name: Blaire Cage  MRN: [id number]  Patient Class: OP- Observation   Admission Date: 5/7/2020  Length of Stay: 0 days  Attending Physician: Adán Huston MD  Primary Care Provider: Eli Edmonds MD        Subjective:     Principal Problem:Dizziness        HPI:  Patient is a 59-year-old female with past medical history significant for HTN, COPD, T2DM, hx of PE, aortic aneurysm, CHF, CKD 3-4, and anemia is being admitted to Hospital Medicine under observation from [facility] Emergency room with complaints of dizziness for 1 month. The patient states that she had an episode of dizziness 1 night ago, but states that she has been dealing with lightheadedness and dizziness for the last month. Patient states that she suffered this bout of dizziness when she was walking with her walker last night, but states that it subsided.  Patient denies any ill hearing difficulties.  No nausea or vomiting reported.  No new visual changes reported.  She states that "It's like a swirling for me." She also admits that she has some lightheadedness from sitting to standing. Patient also endorses having loss of appetite and some fatigue for the last 3 days. She also states that she saw her PCP today, who recommended she come to the ED for further evaluation.  Patient was seen in the ER 2 days earlier for similar complaints as well.  The patient denies headache, fever, chest pain, numbness, weakness, abdominal pain, SOB, or any other complaint at this time. The patient has a PSHx of hysterectomy, appendectomy, fracture surgery, and hernia repair.     Overview/Hospital Course:  No notes on file    Interval History: Patient is still complaining of generalized weakness and unsteadiness. Blood sugars dropped this am down to 66 mg% without use of any Insulin. Patient reportes recent history of hypoglycemic readings. Oral intake is not consistent. " Last week BS dropped down to 37 mg%. Patient denies any CP or SOB.     Review of Systems   Constitutional: Positive for appetite change and fatigue.   Neurological: Positive for dizziness and weakness.   Hematological: Bruises/bleeds easily.   All other systems reviewed and are negative.    Objective:     Vital Signs (Most Recent):  Temp: 97.5 °F (36.4 °C) (05/08/20 0728)  Pulse: (!) 57 (05/08/20 0728)  Resp: 18 (05/08/20 0728)  BP: (!) 144/82 (05/08/20 0728)  SpO2: (!) 94 % (05/08/20 0728) Vital Signs (24h Range):  Temp:  [97.1 °F (36.2 °C)-98.1 °F (36.7 °C)] 97.5 °F (36.4 °C)  Pulse:  [55-70] 57  Resp:  [18] 18  SpO2:  [94 %-98 %] 94 %  BP: (132-179)/(58-86) 144/82     Weight: 54.9 kg (121 lb)  Body mass index is 20.77 kg/m².    Intake/Output Summary (Last 24 hours) at 5/8/2020 0804  Last data filed at 5/8/2020 0400  Gross per 24 hour   Intake --   Output 1000 ml   Net -1000 ml      Physical Exam   Constitutional: She is oriented to person, place, and time. She appears well-developed.   HENT:   Head: Normocephalic and atraumatic.   Eyes: Pupils are equal, round, and reactive to light. Conjunctivae are normal.   Neck: Neck supple. No JVD present. No thyromegaly present.   Cardiovascular: Normal rate and regular rhythm. Exam reveals no gallop and no friction rub.   No murmur heard.  Pulmonary/Chest: Effort normal and breath sounds normal.   Abdominal: Soft. Bowel sounds are normal. She exhibits no distension and no mass. There is no tenderness.   Musculoskeletal: Normal range of motion. She exhibits no edema.   Neurological: She is oriented to person, place, and time. No cranial nerve deficit.   Able to stand with assistance and walk.    Skin: Skin is warm and dry.   Psychiatric: Her behavior is normal.       Significant Labs:   CBC:   Recent Labs   Lab 05/07/20  1736   WBC 8.47   HGB 9.7*   HCT 30.2*        CMP:   Recent Labs   Lab 05/07/20  1736      K 4.1      CO2 22*   GLU 66*   BUN 64*    CREATININE 1.5*   CALCIUM 9.2   ANIONGAP 11   EGFRNONAA 31*     TSH:   Recent Labs   Lab 12/14/19  1043   TSH 0.965       Significant Imaging:   CT head without contrast (05-):  1. No acute intracranial process.  2. Involutional changes with chronic microvascular ischemic changes and small remote left inferior cerebellar lacunar infarct.     CXR: No acute radiographic abnormality with no significant change.     US arterial doppler left lower extremities:   1. Ankle-brachial index of 0.7 on the right and 0.8 on the left, indicative of mild-to-moderate peripheral arterial disease.  2. On the left, suspect stenoses in the proximal and distal SFA up to 50% (not directly visualized).  There is flow within all 3 trifurcation vessels in the calf.     Left foot x-ray:  Chronic findings in the foot with degenerative change most severe at the talonavicular joint.      Assessment/Plan:      * Dizziness  Non-specific. Rule out organic brain condition.  Obtain MRI brain.  Consult neurologist.  Check TSH, RPR, B12, Folic acid.  Fall precautions.  PT evaluation.   Check orthostatis      CKD (chronic kidney disease), stage IV  Monitor BUN/SCr.  Monitor I/Os.  Monitor electrolytes.  Avoid non-steroidal anti-inflammatory medications.          Hypoglycemia  Likely reduced PO intake. Patient instructed to avoid use of Insulin in future.  Check HgA1c.  Follow hypoglycemia protocol.       Type 2 diabetes mellitus with stage 4 chronic kidney disease  Check blood glucose level q AC/HS.  Use Novolog Insulin Sliding Scale as needed.   Continue American Diabetic Association 1800 Kcal diet.        Hyperlipidemia associated with type 2 diabetes mellitus  Chronic problem. Will continue chronic medications and monitor for any changes, adjusting as needed.          Hypertension associated with diabetes  Chronic problem. Will continue chronic medications and monitor for any changes, adjusting as needed.          Hypothyroidism  Check TSH.  Chronic problem. Will continue chronic medications and monitor for any changes, adjusting as needed.          Diabetic neuropathy, type II diabetes mellitus  Fall precautions and PT evaluation in am.    Discussed with patients daughter Miss Toya Hendrickson, I updated her with patient's progress, likely DC home in AM.    VTE Risk Mitigation (From admission, onward)         Ordered     IP VTE HIGH RISK PATIENT  Once      05/07/20 2128     Place sequential compression device  Until discontinued      05/07/20 2128     Place JIMMY hose  Until discontinued      05/07/20 2128     apixaban tablet 5 mg  2 times daily      05/07/20 1840                      Adán Huston MD  Department of Hospital Medicine   [facility]

## 2020-05-08 NOTE — ASSESSMENT & PLAN NOTE
Non-specific. Rule out organic brain condition.  Obtain MRI brain.  Consult neurologist.  Check TSH, RPR, B12, Folic acid.  Fall precautions.  PT evaluation.   Check orthostatis

## 2020-05-08 NOTE — PLAN OF CARE
Pt alert and oriented. Ambulatory on unit. Encouraged standby assist. IVF maintained. Pt oriented to unit. Educated on plan of care. Instructed to notify medical staff prior to ambulating or exiting bed. Pt verbalized an understanding of instructions. Cardiac monitoring continued. Room air. No supplemental oxygen needed. SCD's in use. Safety maintained.

## 2020-05-08 NOTE — PLAN OF CARE
I called the pts room and did not receive any answer. I called the pts daughter Toya Hendrickson [phone] and left a message for a return call to discuss discharge planning. CM following. Jennifer Caban LCSW      05/08/20 4230   Discharge Assessment   Assessment Type Discharge Planning Assessment

## 2020-05-08 NOTE — PLAN OF CARE
Problem: Physical Therapy Goal  Goal: Physical Therapy Goal  Description  Goals to be met by: 2020     Patient will increase functional independence with mobility by performin). Supine to sit with Modified [place]  2). Sit to supine with Modified [place]  3). Sit to stand transfer with Modified [place]  4). Bed to chair transfer with Modified [place] using Rolling Walker  5). Gait  X > 50  feet with Modified [place] using Rolling Walker.      Outcome: Ongoing, Progressing

## 2020-05-08 NOTE — PROGRESS NOTES
Subjective:       Patient ID: Blaire Cage is a 89 y.o. female.    Chief Complaint: Hospital Follow Up    Chief Complaint  Chief Complaint   Patient presents with    Hospital Follow Up       HPI  Blaire Cage is a 89 y.o. female with medical diagnoses as listed in the medical history and problem list that presents for ED follow up.  Established patient with last clinic appointment 4/8/2020.  She was seen in the ED 5/5/2020 see encounter notes below.      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  The patient had to sit down.  She felt slightly dizzy.  She denies any unilateral focal weakness or numbness significant headache double visions slurred speech chest pain abdominal pain nausea vomiting or diarrhea.  She does state that her left foot started to hurt over the past few hours.  She states she has chronic foot pain but the foot is becoming more restless this time.    ED Course as of May 06 0320   Wed May 06, 2020   0308 Patient had episode of dizziness earlier.  She has no signs of central neurologic deficits at this time.  She has chronic renal insufficiency and feels better after fluids.  Chest x-ray shows nothing acute.  CT head shows nothing acute.  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    [JS]   4450 Patient able to stand up and ambulate.  Suspect she has arthritis of her foot.  She does not feel dizzy at this time.  If she did have a TIA she is already on Eliquis, statin, antihypertensive.  She can follow-up with Neurology or her regular doctor.  I do not believe she has an  ischemic limb at this time.  The foot is not cool.  She has palpable pulses.  She has monophasic blood flow.  I do feel that she is stable for discharge.  She needs to follow up with primary care.    [JS]     Today she presents with her daughter.  She continues to complain of weakness, dizziness, and foot pain.  She has not been able to ambulate because of the foot pain and weakness.  She also states that her appetite has been decreased, she states she has been hydrating though.  She did have a BM yesterday that was soft.  She is complaining of some abdominal discomfort.        PAST MEDICAL HISTORY:  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 8/7/2018    Hip arthritis 3/1/2016    Homocysteinemia 8/7/2018    Hyperlipidemia     Hypertension     Normocytic normochromic anemia 6/29/2018    PE (pulmonary thromboembolism) 06/09/2018    Pneumonia of right lower lobe due to infectious organism 9/11/2017    Thyroid disease     hypothyroid    Type 2 diabetes mellitus with stage 3 chronic kidney disease 1/18/2013       PAST SURGICAL HISTORY:  Past Surgical History:   Procedure Laterality Date    APPENDECTOMY      CHOLECYSTECTOMY      FRACTURE SURGERY      right hip     HERNIA REPAIR      groin    HYSTERECTOMY      INSERTION OF IMPLANTABLE LOOP RECORDER N/A 12/12/2018    Procedure: Insertion, Implantable Loop Recorder;  Surgeon: Ashok Herbert MD;  Location: [facility] CATH LAB;  Service: Cardiology;  Laterality: N/A;    PERCUTANEOUS  PINNING OF HIP Left 3/23/2019    Procedure: PINNING, HIP, PERCUTANEOUS;  Surgeon: Jorje Hamlin MD;  Location: [facility] OR;  Service: Orthopedics;  Laterality: Left;    VARICOSE VEIN SURGERY         SOCIAL HISTORY:  Social History     Socioeconomic History    Marital status: Legally      Spouse name: Not on file    Number of children: Not on file    Years of education: Not on file    Highest education level: Not on file   Occupational History    Not on file   Social Needs    Financial resource strain: Not very hard    Food insecurity:     Worry: Never true     Inability: Never true    Transportation needs:     Medical: No     Non-medical: No   Tobacco Use    Smoking status: Former Smoker     Packs/day: 0.35     Years: 44.00     Pack years: 15.40     Types: Cigarettes     Last attempt to quit: 2015     Years since quittin.0    Smokeless tobacco: Never Used    Tobacco comment: 2015   Substance and Sexual Activity    Alcohol use: No     Alcohol/week: 0.0 standard drinks     Frequency: Never     Binge frequency: Never    Drug use: No    Sexual activity: Never   Lifestyle    Physical activity:     Days per week: 0 days     Minutes per session: 0 min    Stress: Only a little   Relationships    Social connections:     Talks on phone: More than three times a week     Gets together: More than three times a week     Attends [employer] service: Not on file     Active member of club or organization: No     Attends meetings of clubs or organizations: Never     Relationship status:    Other Topics Concern    Not on file   Social History Narrative    Not on file       FAMILY HISTORY:  Family History   Problem Relation Age of Onset    Hypertension Mother     Heart disease Mother     Lung disease Father     Diabetes Sister     Diabetes Brother     Kidney disease Son        ALLERGIES AND MEDICATIONS: updated and reviewed.  Review of patient's allergies indicates:    Allergen Reactions    Carvedilol Other (See Comments)     Bradycardia and syncope    Boniva [ibandronate]     Codeine     Hydralazine analogues     Iodinated contrast media Hives    Kionex [sodium polystyrene sulfonate] Diarrhea     From encounter 12/11/17 for diarrhea--not true allergy.    Lisinopril     Morphine      No current facility-administered medications for this visit.      No current outpatient medications on file.     Facility-Administered Medications Ordered in Other Visits   Medication Dose Route Frequency Provider Last Rate Last Dose    0.45% NaCl infusion   Intravenous Continuous Adán Huston MD 75 mL/hr at 05/07/20 2219      acetaminophen tablet 650 mg  650 mg Oral Q4H PRN Adán Huston MD        albuterol-ipratropium 2.5 mg-0.5 mg/3 mL nebulizer solution 3 mL  3 mL Nebulization Q6H PRN Adán Huston MD        amLODIPine tablet 5 mg  5 mg Oral BID Adán Huston MD   5 mg at 05/08/20 0819    apixaban tablet 5 mg  5 mg Oral BID Adán Huston MD   5 mg at 05/08/20 0818    ascorbic acid (vitamin C) tablet 500 mg  500 mg Oral Daily Aádn Huston MD   500 mg at 05/08/20 0819    aspirin EC tablet 81 mg  81 mg Oral Daily Adán Huston MD   81 mg at 05/08/20 0818    calcium carbonate (OS-TASIA) tablet 500 mg  500 mg Oral BID Adán Huston MD   500 mg at 05/08/20 0819    dextrose 50% injection 12.5 g  12.5 g Intravenous PRN Adán Huston MD        dextrose 50% injection 25 g  25 g Intravenous PRN Adán Huston MD        docusate sodium capsule 100 mg  100 mg Oral BID Adán Huston MD   100 mg at 05/08/20 0819    ferrous sulfate EC tablet 325 mg  325 mg Oral Daily Adán Huston MD   325 mg at 05/08/20 0819    folic acid-vit B6-vit B12 2.5-25-2 mg tablet 1 tablet  1 tablet Oral Daily Adán Huston MD   1 tablet at 05/08/20 0818    glucagon (human recombinant) injection 1 mg  1 mg Intramuscular PRN Adán Huston MD        glucose chewable tablet 16 g  16 g Oral PRN Adán Huston MD        glucose  chewable tablet 24 g  24 g Oral PRN Adán Huston MD        insulin aspart U-100 pen 0-5 Units  0-5 Units Subcutaneous QID (AC + HS) PRN Adán Huston MD        levothyroxine tablet 88 mcg  88 mcg Oral Before breakfast Adán Huston MD   88 mcg at 05/08/20 0546    magnesium oxide tablet 400 mg  400 mg Oral Daily Adán Huston MD   400 mg at 05/08/20 0819    magnesium oxide tablet 800 mg  800 mg Oral PRN Adán Huston MD        magnesium oxide tablet 800 mg  800 mg Oral PRN Adán Huston MD        montelukast tablet 10 mg  10 mg Oral QHS Adán Huston MD   10 mg at 05/07/20 2214    multivitamin tablet 1 tablet  1 tablet Oral Daily Adán Huston MD   1 tablet at 05/08/20 0819    omega 3-dha-epa-fish oil 1,000 mg (120 mg-180 mg) Cap 1 capsule  1 capsule Oral Daily Adán Huston MD   1 capsule at 05/08/20 0818    ondansetron injection 4 mg  4 mg Intravenous Q8H PRN Adán Huston MD        pantoprazole EC tablet 40 mg  40 mg Oral Daily Adán Huston MD   40 mg at 05/08/20 0819    potassium chloride 10% oral solution 40 mEq  40 mEq Oral PRN Adán Huston MD        potassium chloride 10% oral solution 40 mEq  40 mEq Oral PRN Adán Huston MD        potassium, sodium phosphates 280-160-250 mg packet 2 packet  2 packet Oral PRN Adán Huston MD        potassium, sodium phosphates 280-160-250 mg packet 2 packet  2 packet Oral PRN Adán Huston MD        sertraline tablet 25 mg  25 mg Oral Daily Adán Huston MD   25 mg at 05/08/20 0819    simvastatin tablet 40 mg  40 mg Oral QHS Adán Huston MD   40 mg at 05/07/20 2214    sodium chloride 0.9% flush 10 mL  10 mL Intravenous PRN Adán Huston MD        traMADoL tablet 50 mg  50 mg Oral Q8H PRN Adán Huston MD   50 mg at 05/07/20 2305       I have reviewed the patient's medical, family, and social history in detail and updated the computerized patient record.    Review of Systems   Constitutional: Positive for activity change, appetite change and fatigue. Negative for chills,  "fever and unexpected weight change.   HENT: Negative for congestion, hearing loss, nosebleeds, postnasal drip, sinus pressure, sinus pain and sore throat.    Eyes: Negative for pain, discharge, redness and visual disturbance.   Respiratory: Negative for cough, chest tightness and shortness of breath.    Cardiovascular: Negative for chest pain and palpitations.   Gastrointestinal: Positive for abdominal pain. Negative for constipation, diarrhea, nausea and vomiting.   Endocrine: Negative for cold intolerance and heat intolerance.   Musculoskeletal: Positive for gait problem. Negative for back pain.        Left foot pain     Neurological: Positive for dizziness, weakness and light-headedness. Negative for syncope and headaches.   Psychiatric/Behavioral: Positive for dysphoric mood. Negative for agitation. The patient is not nervous/anxious.          Objective:      Vitals:    05/07/20 1619   BP: (!) 132/58   BP Location: Right arm   Patient Position: Sitting   BP Method: Medium (Manual)   Pulse: 66   Temp: 98.1 °F (36.7 °C)   TempSrc: Oral   SpO2: 95%   Weight: 56.1 kg (123 lb 10.9 oz)   Height: 5' 4" (1.626 m)     Physical Exam   Constitutional: Vital signs are normal. She appears well-developed and well-nourished. She appears lethargic.   Patient presents in wheel-chair   HENT:   Head: Normocephalic and atraumatic.   Right Ear: Hearing normal.   Left Ear: Hearing normal.   Nose: Nose normal.   Eyes: Pupils are equal, round, and reactive to light. Conjunctivae, EOM and lids are normal.   Neck: Full passive range of motion without pain.   Cardiovascular: Normal rate, regular rhythm, S1 normal, S2 normal and normal heart sounds.   Pulmonary/Chest: Effort normal and breath sounds normal. No accessory muscle usage. No tachypnea. No respiratory distress.   Abdominal: Soft. Normal appearance. There is tenderness.       Neurological: She appears lethargic.   Psychiatric: Her speech is normal and behavior is normal. Thought " content normal. Cognition and memory are normal. She exhibits a depressed mood.         Assessment:       1. Hypertension associated with diabetes    2. Weakness    3. Dizziness    4. Left foot pain    5. Abdominal pain, unspecified abdominal location          Plan:       Blaire was seen today for hospital follow up.    Diagnoses and all orders for this visit:    Hypertension associated with diabetes  -     Refer to Emergency Dept.    Weakness  -     Refer to Emergency Dept.    Dizziness  -     Refer to Emergency Dept.    Left foot pain  -     Refer to Emergency Dept.    Abdominal pain, unspecified abdominal location  -     Refer to Emergency Dept.    Patient exhibits an acute decline in overall health and concerning symptoms as above within the last couple of weeks.  Recommend re-evaluation in emergency room with admission for further workup and observation.      No follow-ups on file.

## 2020-05-08 NOTE — CONSULTS
"[facility]  Neurology  Consult Note    Patient Name: Blaire Cage  MRN: [id number]  Admission Date: 5/7/2020  Hospital Length of Stay: 0 days  Code Status: Full Code   Attending Provider: Dr Huston  Consulting Provider: Dr Morris  Primary Care Physician: Eli Edmonds MD  Principal Problem:Dizziness      Subjective:     Chief Complaint:  dizziness    HPI from EMR: Patient is a 59-year-old female with past medical history significant for HTN, COPD, T2DM, hx of PE, aortic aneurysm, CHF, CKD 3-4, and anemia is being admitted to Hospital Medicine under observation from [facility] Emergency room with complaints of dizziness for 1 month. The patient states that she had an episode of dizziness 1 night ago, but states that she has been dealing with lightheadedness and dizziness for the last month. Patient states that she suffered this bout of dizziness when she was walking with her walker last night, but states that it subsided.  Patient denies any ill hearing difficulties.  No nausea or vomiting reported.  No new visual changes reported.  She states that "It's like a swirling for me." She also admits that she has some lightheadedness from sitting to standing. Patient also endorses having loss of appetite and some fatigue for the last 3 days. She also states that she saw her PCP today, who recommended she come to the ED for further evaluation.  Patient was seen in the ER 2 days earlier for similar complaints as well.  The patient denies headache, fever, chest pain, numbness, weakness, abdominal pain, SOB, or any other complaint at this time. The patient has a PSHx of hysterectomy, appendectomy, fracture surgery, and hernia repair.       Neurological Consult: Pt reports that she has been having dizziness , mainly triggered with movement. She denies nausea. She says that she has the episodes when she was younger also. She denies vision changes, headaches, and new weakness. The " episodes subside and last 5-10 minutes. She takes aspirin, eliquis, and statin at home. Pt seen and examined with Dr Morris and POC discussed.  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 8/7/2018    Hip arthritis 3/1/2016    Homocysteinemia 8/7/2018    Hyperlipidemia     Hypertension     Normocytic normochromic anemia 6/29/2018    PE (pulmonary thromboembolism) 06/09/2018    Pneumonia of right lower lobe due to infectious organism 9/11/2017    Thyroid disease     hypothyroid    Type 2 diabetes mellitus with stage 3 chronic kidney disease 1/18/2013       Past Surgical History:   Procedure Laterality Date    APPENDECTOMY      CHOLECYSTECTOMY      FRACTURE SURGERY      right hip     HERNIA REPAIR      groin    HYSTERECTOMY      INSERTION OF IMPLANTABLE LOOP RECORDER N/A 12/12/2018    Procedure: Insertion, Implantable Loop Recorder;  Surgeon: Ashok Herbert MD;  Location: [facility] CATH LAB;  Service: Cardiology;  Laterality: N/A;    PERCUTANEOUS PINNING OF HIP Left 3/23/2019    Procedure: PINNING, HIP, PERCUTANEOUS;  Surgeon: Jorje Hamlin MD;  Location: [facility] OR;  Service: Orthopedics;  Laterality: Left;    VARICOSE VEIN SURGERY         Review of patient's allergies indicates:   Allergen Reactions    Carvedilol Other (See Comments)     Bradycardia and syncope    Boniva [ibandronate]     Codeine     Hydralazine analogues     Iodinated contrast media Hives    Kionex [sodium polystyrene sulfonate] Diarrhea     From  encounter 12/11/17 for diarrhea--not true allergy.    Lisinopril     Morphine        Current Neurological Medications: asa, eliquis, statin    No current facility-administered medications on file prior to encounter.      Current Outpatient Medications on File Prior to Encounter   Medication Sig    acetaminophen (TYLENOL) 325 MG tablet Take 2 tablets (650 mg total) by mouth every 4 (four) hours as needed. (Patient taking differently: Take 650 mg by mouth every 4 (four) hours as needed for Pain. )    albuterol-ipratropium (DUO-NEB) 2.5 mg-0.5 mg/3 mL nebulizer solution Take 3 mLs by nebulization every 6 (six) hours as needed for Wheezing. Rescue    amLODIPine (NORVASC) 5 MG tablet Take 1 tablet (5 mg total) by mouth 2 (two) times daily.    apixaban (ELIQUIS) 5 mg Tab Take 1 tablet (5 mg total) by mouth 2 (two) times daily.    ascorbic acid (VITAMIN C) 500 MG tablet Take 500 mg by mouth once daily.      aspirin (ECOTRIN) 81 MG EC tablet Take 81 mg by mouth once daily.      calcium carbonate (OS-TASIA) 500 mg calcium (1,250 mg) tablet Take 1 tablet by mouth 2 (two) times daily.      cyanocobalamin/folic acid (FOLTRATE ORAL) vitamin B12-folic acid   2.5 - 25-2MG    docusate sodium (COLACE) 100 MG capsule Take 1 capsule (100 mg total) by mouth 2 (two) times daily.    ferrous sulfate (FEOSOL) 325 mg (65 mg iron) Tab tablet Take 1 tablet (325 mg total) by mouth 2 (two) times daily with meals.    fish oil-omega-3 fatty acids 300-1,000 mg capsule Take 2 g by mouth every evening.     fluocinolone (SYNALAR) 0.01 % external solution Apply topically 2 (two) times daily.    fluticasone (FLONASE) 50 mcg/actuation nasal spray 2 sprays by Each Nare route once daily.    fluticasone-umeclidin-vilanter (TRELEGY ELLIPTA) 100-62.5-25 mcg DsDv Inhale 1 puff into the lungs once daily.    folic acid-vit B6-vit B12 2.5-25-2 mg (FOLBIC) 2.5-25-2 mg Tab Take 1 tablet by mouth once daily.    furosemide (LASIX) 20 MG tablet Take 1  tablet only as needed, for swelling, increase SOB, weight gain of 3 lbs overnight or 5 lbs for the week    gabapentin (NEURONTIN) 300 MG capsule Take 1 capsule (300 mg total) by mouth every evening.    levothyroxine (SYNTHROID) 88 MCG tablet Take 88 mcg by mouth once daily.    lidocaine (LIDODERM) 5 % Place 1 patch onto the skin once daily. Remove & Discard patch within 12 hours or as directed by MD    magnesium oxide (MAG-OX) 400 mg (241.3 mg magnesium) tablet TAKE 1 TABLET BY MOUTH ONCE DAILY    montelukast (SINGULAIR) 10 mg tablet TAKE 1 TABLET BY MOUTH ONCE DAILY IN THE EVENING    multivitamin (THERAGRAN) tablet Take 1 tablet by mouth once daily.    mupirocin (BACTROBAN) 2 % ointment Apply topically 2 (two) times daily.    nitroGLYCERIN (NITROSTAT) 0.4 MG SL tablet Place 1 tablet (0.4 mg total) under the tongue every 5 (five) minutes as needed for Chest pain. As needed (Patient taking differently: Place 0.4 mg under the tongue every 5 (five) minutes as needed for Chest pain. Seek medical help if pain is not relieved after the third dose.)    predniSONE (DELTASONE) 20 MG tablet Take one daily for 3 days and repeat if needed.    sertraline (ZOLOFT) 25 MG tablet Take 1 tablet (25 mg total) by mouth once daily.    simvastatin (ZOCOR) 40 MG tablet TAKE 1 TABLET BY MOUTH ONCE DAILY IN THE EVENING    traMADoL (ULTRAM) 50 mg tablet Take 1 tablet (50 mg total) by mouth every 8 (eight) hours as needed for Pain.    vitamin D 1000 units Tab Take 1 tablet (1,000 Units total) by mouth once daily.      Family History     Problem Relation (Age of Onset)    Diabetes Sister, Brother    Heart disease Mother    Hypertension Mother    Kidney disease Son    Lung disease Father        Tobacco Use    Smoking status: Former Smoker     Packs/day: 0.35     Years: 44.00     Pack years: 15.40     Types: Cigarettes     Last attempt to quit: 2015     Years since quittin.0    Smokeless tobacco: Never Used    Tobacco  comment: 1/08/2015   Substance and Sexual Activity    Alcohol use: No     Alcohol/week: 0.0 standard drinks     Frequency: Never     Binge frequency: Never    Drug use: No    Sexual activity: Never     Review of Systems   Constitutional: Negative.    HENT: Negative.    Eyes: Negative.    Respiratory: Negative.    Cardiovascular: Negative.    Gastrointestinal: Negative.    Endocrine: Negative.    Genitourinary: Negative.    Musculoskeletal: Negative.    Skin: Negative.    Allergic/Immunologic: Negative.    Neurological: Positive for dizziness.   Hematological: Negative.    Psychiatric/Behavioral: Negative.      Objective:     Vital Signs (Most Recent):  Temp: 97.6 °F (36.4 °C) (05/08/20 1132)  Pulse: (!) 56 (05/08/20 1132)  Resp: 18 (05/08/20 1132)  BP: 134/68 (05/08/20 1132)  SpO2: 95 % (05/08/20 1132) Vital Signs (24h Range):  Temp:  [97.1 °F (36.2 °C)-98.1 °F (36.7 °C)] 97.6 °F (36.4 °C)  Pulse:  [55-70] 56  Resp:  [18] 18  SpO2:  [94 %-98 %] 95 %  BP: (132-179)/(58-86) 134/68     Weight: 54.9 kg (121 lb)  Body mass index is 20.77 kg/m².    Physical Exam   Constitutional: She is oriented to person, place, and time. She appears well-developed and well-nourished.   HENT:   Head: Normocephalic.   Eyes: Pupils are equal, round, and reactive to light. EOM are normal.   Neck: Normal range of motion.   Cardiovascular: Normal rate.   Pulmonary/Chest: Effort normal.   Abdominal: Soft.   Musculoskeletal: Normal range of motion.   Neurological: She is alert and oriented to person, place, and time. She has a normal Finger-Nose-Finger Test.   Skin: Skin is warm and dry.   Psychiatric: Her speech is normal.       NEUROLOGICAL EXAMINATION:     MENTAL STATUS   Oriented to person, place, and time.   Follows 1 step commands.   Attention: normal. Concentration: normal.   Speech: speech is normal   Level of consciousness: alert  Able to name object. Able to repeat.     CRANIAL NERVES     CN II   Visual fields full to  confrontation.     CN III, IV, VI   Pupils are equal, round, and reactive to light.  Extraocular motions are normal.     CN V   Facial sensation intact.   Left facial sensation deficit: slightly decreaed.    CN VIII   Hearing: intact    CN XI   CN XI normal.     CN XII   CN XII normal.     MOTOR EXAM   Muscle bulk: normal  Overall muscle tone: normal  Right arm tone: normal  Left arm tone: normal       3-4/5 strength BL extremities     SENSORY EXAM   Light touch normal.     GAIT AND COORDINATION      Coordination   Finger to nose coordination: normal      Significant Labs:  CMP  Sodium   Date Value Ref Range Status   05/08/2020 141 136 - 145 mmol/L Final     Potassium   Date Value Ref Range Status   05/08/2020 4.2 3.5 - 5.1 mmol/L Final     Chloride   Date Value Ref Range Status   05/08/2020 108 95 - 110 mmol/L Final     CO2   Date Value Ref Range Status   05/08/2020 25 23 - 29 mmol/L Final     Glucose   Date Value Ref Range Status   05/08/2020 72 70 - 110 mg/dL Final     BUN, Bld   Date Value Ref Range Status   05/08/2020 53 (H) 8 - 23 mg/dL Final     Creatinine   Date Value Ref Range Status   05/08/2020 1.4 0.5 - 1.4 mg/dL Final   03/04/2013 1.1 0.5 - 1.4 mg/dL Final     Calcium   Date Value Ref Range Status   05/08/2020 8.9 8.7 - 10.5 mg/dL Final   03/04/2013 9.8 8.7 - 10.5 mg/dL Final     Total Protein   Date Value Ref Range Status   05/08/2020 5.8 (L) 6.0 - 8.4 g/dL Final     Albumin   Date Value Ref Range Status   05/08/2020 3.1 (L) 3.5 - 5.2 g/dL Final     Total Bilirubin   Date Value Ref Range Status   05/08/2020 0.3 0.1 - 1.0 mg/dL Final     Comment:     For infants and newborns, interpretation of results should be based  on gestational age, weight and in agreement with clinical  observations.  Premature Infant recommended reference ranges:  Up to 24 hours.............<8.0 mg/dL  Up to 48 hours............<12.0 mg/dL  3-5 days..................<15.0 mg/dL  6-29 days.................<15.0 mg/dL        Alkaline Phosphatase   Date Value Ref Range Status   05/08/2020 55 55 - 135 U/L Final   03/04/2013 60 55 - 135 U/L Final     AST   Date Value Ref Range Status   05/08/2020 20 10 - 40 U/L Final   03/04/2013 21 10 - 40 U/L Final     ALT   Date Value Ref Range Status   05/08/2020 16 10 - 44 U/L Final     Anion Gap   Date Value Ref Range Status   05/08/2020 8 8 - 16 mmol/L Final   03/04/2013 10 5 - 15 meq/L Final     eGFR if    Date Value Ref Range Status   05/08/2020 38 (A) >60 mL/min/1.73 m^2 Final     eGFR if non    Date Value Ref Range Status   05/08/2020 33 (A) >60 mL/min/1.73 m^2 Final     Comment:     Calculation used to obtain the estimated glomerular filtration  rate (eGFR) is the CKD-EPI equation.        Lab Results   Component Value Date    WBC 6.60 05/08/2020    HGB 8.9 (L) 05/08/2020    HCT 28.2 (L) 05/08/2020    MCV 98 05/08/2020     05/08/2020     Lab Results   Component Value Date    CHOL 186 12/14/2019    CHOL 149 05/23/2017    CHOL 142 09/01/2016     Lab Results   Component Value Date    HDL 58 12/14/2019    HDL 52 05/23/2017    HDL 43 09/01/2016     Lab Results   Component Value Date    LDLCALC 105.0 12/14/2019    LDLCALC 78.2 05/23/2017    LDLCALC 76.8 09/01/2016     Lab Results   Component Value Date    TRIG 115 12/14/2019    TRIG 94 05/23/2017    TRIG 111 09/01/2016     Lab Results   Component Value Date    CHOLHDL 31.2 12/14/2019    CHOLHDL 34.9 05/23/2017    CHOLHDL 30.3 09/01/2016     Lab Results   Component Value Date    HGBA1C 6.0 (H) 12/14/2019           Significant Imaging:   X-Ray Foot Complete Left  Narrative: EXAMINATION:  XR FOOT COMPLETE 3 VIEW LEFT    CLINICAL HISTORY:  .  Pain in left foot    TECHNIQUE:  AP, lateral and oblique views of the left foot were performed.    COMPARISON:  None    FINDINGS:  No acute fracture.  There is cortical deformity of the navicular which is a chronic finding.  Dorsal and plantar calcaneal spurs.  Dorsal beak  of the talus.  There is pes planus although exam is nonweightbearing.  Severe degenerative change of the talonavicular joint and mild degenerative changes elsewhere in the midfoot and scattered toe IP joints.  Osteopenia and atherosclerosis.  Impression: Chronic findings in the foot with degenerative change most severe at the talonavicular joint.    Electronically signed by: Harpreet Briggs  Date:    05/06/2020  Time:    08:46  US Lower Extrem Arteries Left with OLIVIA  Narrative: EXAMINATION:  US ARTERIAL LOWER EXTREMITY LEFT WITH OLIVIA (XPD)    CLINICAL HISTORY:  lt foot pain;  Pain in left foot    TECHNIQUE:  Left lower extremity arterial duplex ultrasound examination performed. Multiple gray scale and color doppler images were obtained in addition to waveform analysis.  Ankle-brachial indices were calculated.    COMPARISON:  06/24/2018    FINDINGS:  The ankle brachial index on the right is 0.7 and on the left is 0.8.    The peak systolic velocities on the left are as follows, in centimeters/second:    Common femoral artery: 158    Superficial femoral artery, proximal: 115    Superficial femoral artery, mid portion: 77    Superficial femoral artery, distal: 156    Popliteal artery: 93    Posterior tibial artery: 112    Anterior tibial artery: 121    Velocity was also interrogated in the left dorsalis pedis artery measuring 31 centimeters/second with monophasic waveform with tardus parvus morphology.  Biphasic waveforms elsewhere in the left leg.  Impression: 1. Ankle-brachial index of 0.7 on the right and 0.8 on the left, indicative of mild-to-moderate peripheral arterial disease.  2. On the left, suspect stenoses in the proximal and distal SFA up to 50% (not directly visualized).  There is flow within all 3 trifurcation vessels in the calf.    Electronically signed by: Harpreet Briggs  Date:    05/06/2020  Time:    08:39  X-Ray Chest PA And Lateral  Narrative: EXAMINATION:  XR CHEST PA AND LATERAL    CLINICAL  HISTORY:  Weakness    TECHNIQUE:  PA and lateral views of the chest were performed.    COMPARISON:  03/28/2020    FINDINGS:  The heart is mildly enlarged.  No edema is detected.  Loop recorder on the left.    No focal mass, infiltrate or consolidation.    No acute osseous abnormality.  No effusion or pneumothorax.    No significant change.  Impression: No acute radiographic abnormality with no significant change.    Electronically signed by: Keith Cortez  Date:    05/06/2020  Time:    00:27  MRA  MRI  CUS        Assessment and Plan:    Dizziness  -Rule out TIA  -CT head: no acute process  -MRI brain-pending  -MRA brain-pending  -CUS-pending  -Lipids-pending  -[id number]  -eliquis, aspirin, statin    DB  -A1c pending  -IM managing    Hypothyroid  -TSH pending  -levothyroxine  -IM managing    HTN  -IM managing    Hypoglycemia  -A1c pendingg  -IM managing    Check orthostatics.  Work up in progress. Rule out central cause of dizziness.       Active Diagnoses:    Diagnosis Date Noted POA    PRINCIPAL PROBLEM:  Dizziness [R42] 05/07/2020 Yes    CKD (chronic kidney disease), stage IV [N18.4] 05/07/2020 Yes    Hypoglycemia [E16.2] 02/14/2020 Yes    Diabetic neuropathy, type II diabetes mellitus [E11.40] 01/18/2013 Yes    Hypothyroidism [E03.9] 01/18/2013 Yes    Hypertension associated with diabetes [E11.59, I10] 01/18/2013 Yes    Hyperlipidemia associated with type 2 diabetes mellitus [E11.69, E78.5] 01/18/2013 Yes    Type 2 diabetes mellitus with stage 4 chronic kidney disease [E11.22, N18.4] 01/18/2013 Yes      Problems Resolved During this Admission:       VTE Risk Mitigation (From admission, onward)         Ordered     IP VTE HIGH RISK PATIENT  Once      05/07/20 2128     Place sequential compression device  Until discontinued      05/07/20 2128     Place JIMMY hose  Until discontinued      05/07/20 2128     apixaban tablet 5 mg  2 times daily      05/07/20 1840                Thank you for your consult. I  will follow-up with patient. Please contact us if you have any additional questions.    Bethanie Sorensen NP  Neurology  [facility]

## 2020-05-08 NOTE — PLAN OF CARE
I completed the assessment with the pts daughter Toya Hendrickson [phone]. She confirmed the pts home address and that she lives alone. She stated that she has 3 girl children locally and they all check on her. She does not use any HH services at this time and she uses a home walker, nebulizer and oxygen at 2L from [facility]. The [facility] uses [facility]. Verified PCP as Dr. Edmonds and insurance as [employer]. The pt does not have any discharge needs at this time. Jennifer Caban [facility]      05/08/20 1454   Discharge Assessment   Assessment Type Discharge Planning Assessment   Confirmed/corrected address and phone number on facesheet? Yes   Assessment information obtained from? Caregiver   Communicated expected length of stay with patient/caregiver no   Prior to hospitilization cognitive status: Unable to Assess   Prior to hospitalization functional status: Assistive Equipment   Current cognitive status: Unable to Assess   Current Functional Status: Assistive Equipment   Lives With alone   Able to Return to Prior Arrangements yes   Is patient able to care for self after discharge? Yes   Who are your caregiver(s) and their phone number(s)? Toya Hendrickson [phone] and Annabella Guerra [phone]   Readmission Within the Last 30 Days no previous admission in last 30 days   Patient currently being followed by outpatient case management? No   Patient currently receives any other outside agency services? No   Equipment Currently Used at Home walker, rolling;nebulizer;oxygen   Do you have any problems affording any of your prescribed medications? No   Is the patient taking medications as prescribed? yes   Does the patient have transportation home? Yes   Transportation Anticipated family or friend will provide   Does the patient receive services at the [facility]? No   Discharge Plan A Home   Discharge Plan B Home;Home Health   DME Needed Upon Discharge  none   Patient/Family in Agreement  with Plan yes

## 2020-05-08 NOTE — NURSING
Received report from Lelo in the ED that patient would be transported to 420 on observation for c/o dizziness.

## 2020-05-08 NOTE — RESPIRATORY THERAPY
05/08/20 0719   Patient Assessment/Suction   Level of Consciousness (AVPU) alert   Respiratory Effort Unlabored   Expansion/Accessory Muscles/Retractions expansion symmetric;no use of accessory muscles   All Lung Fields Breath Sounds clear;diminished   PRE-TX-O2   O2 Device (Oxygen Therapy) room air   SpO2 96 %   Pulse Oximetry Type Intermittent   Pulse (!) 55   Resp 18   Aerosol Therapy   $ Aerosol Therapy Charges PRN treatment not required

## 2020-05-08 NOTE — PLAN OF CARE
POC reviewed with patient and patient's family, understanding verbalized. AAOX4. Room air. Glucose monitoring/coverage ACHS as needed. Telemetry monitoring maintained. VS stable throughout shift. Safety maintained. Will continue to monitor.     Unknown

## 2020-05-08 NOTE — ASSESSMENT & PLAN NOTE
Likely reduced PO intake. Patient instructed to avoid use of Insulin in future.  Check HgA1c.  Follow hypoglycemia protocol.

## 2020-05-09 VITALS
SYSTOLIC BLOOD PRESSURE: 149 MMHG | RESPIRATION RATE: 16 BRPM | WEIGHT: 121 LBS | TEMPERATURE: 98 F | HEART RATE: 62 BPM | OXYGEN SATURATION: 97 % | DIASTOLIC BLOOD PRESSURE: 65 MMHG | HEIGHT: 64 IN | BODY MASS INDEX: 20.66 KG/M2

## 2020-05-09 LAB
ALBUMIN SERPL BCP-MCNC: 2.9 G/DL (ref 3.5–5.2)
ALP SERPL-CCNC: 65 U/L (ref 55–135)
ALT SERPL W/O P-5'-P-CCNC: 14 U/L (ref 10–44)
ANION GAP SERPL CALC-SCNC: 7 MMOL/L (ref 8–16)
AST SERPL-CCNC: 19 U/L (ref 10–40)
BACTERIA #/AREA URNS HPF: NORMAL /HPF
BASOPHILS # BLD AUTO: 0.04 K/UL (ref 0–0.2)
BASOPHILS NFR BLD: 0.6 % (ref 0–1.9)
BILIRUB SERPL-MCNC: 0.3 MG/DL (ref 0.1–1)
BILIRUB UR QL STRIP: NEGATIVE
BUN SERPL-MCNC: 50 MG/DL (ref 8–23)
CALCIUM SERPL-MCNC: 8.8 MG/DL (ref 8.7–10.5)
CHLORIDE SERPL-SCNC: 108 MMOL/L (ref 95–110)
CHOLEST SERPL-MCNC: 154 MG/DL (ref 120–199)
CHOLEST/HDLC SERPL: 2.9 {RATIO} (ref 2–5)
CLARITY UR: CLEAR
CO2 SERPL-SCNC: 23 MMOL/L (ref 23–29)
COLOR UR: YELLOW
CREAT SERPL-MCNC: 1.5 MG/DL (ref 0.5–1.4)
DIFFERENTIAL METHOD: ABNORMAL
EOSINOPHIL # BLD AUTO: 0.3 K/UL (ref 0–0.5)
EOSINOPHIL NFR BLD: 4.6 % (ref 0–8)
ERYTHROCYTE [DISTWIDTH] IN BLOOD BY AUTOMATED COUNT: 13.1 % (ref 11.5–14.5)
EST. GFR  (AFRICAN AMERICAN): 35 ML/MIN/1.73 M^2
EST. GFR  (NON AFRICAN AMERICAN): 31 ML/MIN/1.73 M^2
GLUCOSE SERPL-MCNC: 116 MG/DL (ref 70–110)
GLUCOSE UR QL STRIP: NEGATIVE
HCT VFR BLD AUTO: 27 % (ref 37–48.5)
HDLC SERPL-MCNC: 54 MG/DL (ref 40–75)
HDLC SERPL: 35.1 % (ref 20–50)
HGB BLD-MCNC: 8.7 G/DL (ref 12–16)
HGB UR QL STRIP: NEGATIVE
IMM GRANULOCYTES # BLD AUTO: 0.05 K/UL (ref 0–0.04)
IMM GRANULOCYTES NFR BLD AUTO: 0.7 % (ref 0–0.5)
KETONES UR QL STRIP: NEGATIVE
LDLC SERPL CALC-MCNC: 83 MG/DL (ref 63–159)
LEUKOCYTE ESTERASE UR QL STRIP: ABNORMAL
LYMPHOCYTES # BLD AUTO: 1.8 K/UL (ref 1–4.8)
LYMPHOCYTES NFR BLD: 26.7 % (ref 18–48)
MCH RBC QN AUTO: 31.2 PG (ref 27–31)
MCHC RBC AUTO-ENTMCNC: 32.2 G/DL (ref 32–36)
MCV RBC AUTO: 97 FL (ref 82–98)
MICROSCOPIC COMMENT: NORMAL
MONOCYTES # BLD AUTO: 0.8 K/UL (ref 0.3–1)
MONOCYTES NFR BLD: 12.2 % (ref 4–15)
NEUTROPHILS # BLD AUTO: 3.8 K/UL (ref 1.8–7.7)
NEUTROPHILS NFR BLD: 55.2 % (ref 38–73)
NITRITE UR QL STRIP: NEGATIVE
NONHDLC SERPL-MCNC: 100 MG/DL
NRBC BLD-RTO: 0 /100 WBC
PH UR STRIP: 6 [PH] (ref 5–8)
PLATELET # BLD AUTO: 163 K/UL (ref 150–350)
PMV BLD AUTO: 10.9 FL (ref 9.2–12.9)
POCT GLUCOSE: 114 MG/DL (ref 70–110)
POTASSIUM SERPL-SCNC: 4.3 MMOL/L (ref 3.5–5.1)
PROT SERPL-MCNC: 5.7 G/DL (ref 6–8.4)
PROT UR QL STRIP: ABNORMAL
RBC # BLD AUTO: 2.79 M/UL (ref 4–5.4)
SODIUM SERPL-SCNC: 138 MMOL/L (ref 136–145)
SP GR UR STRIP: 1.01 (ref 1–1.03)
SQUAMOUS #/AREA URNS HPF: 1 /HPF
TRIGL SERPL-MCNC: 85 MG/DL (ref 30–150)
URN SPEC COLLECT METH UR: ABNORMAL
UROBILINOGEN UR STRIP-ACNC: NEGATIVE EU/DL
WBC # BLD AUTO: 6.9 K/UL (ref 3.9–12.7)
WBC #/AREA URNS HPF: 5 /HPF (ref 0–5)

## 2020-05-09 PROCEDURE — G0378 HOSPITAL OBSERVATION PER HR: HCPCS

## 2020-05-09 PROCEDURE — 25000003 PHARM REV CODE 250: Performed by: INTERNAL MEDICINE

## 2020-05-09 PROCEDURE — 94761 N-INVAS EAR/PLS OXIMETRY MLT: CPT

## 2020-05-09 PROCEDURE — 80053 COMPREHEN METABOLIC PANEL: CPT

## 2020-05-09 PROCEDURE — 97530 THERAPEUTIC ACTIVITIES: CPT

## 2020-05-09 PROCEDURE — 81000 URINALYSIS NONAUTO W/SCOPE: CPT

## 2020-05-09 PROCEDURE — 80061 LIPID PANEL: CPT

## 2020-05-09 PROCEDURE — 99900035 HC TECH TIME PER 15 MIN (STAT)

## 2020-05-09 PROCEDURE — 85025 COMPLETE CBC W/AUTO DIFF WBC: CPT

## 2020-05-09 PROCEDURE — 36415 COLL VENOUS BLD VENIPUNCTURE: CPT

## 2020-05-09 RX ADMIN — OMEGA-3 FATTY ACIDS CAP 1000 MG 1 CAPSULE: 1000 CAP at 09:05

## 2020-05-09 RX ADMIN — THERA TABS 1 TABLET: TAB at 09:05

## 2020-05-09 RX ADMIN — PANTOPRAZOLE SODIUM 40 MG: 40 TABLET, DELAYED RELEASE ORAL at 09:05

## 2020-05-09 RX ADMIN — AMLODIPINE BESYLATE 5 MG: 5 TABLET ORAL at 09:05

## 2020-05-09 RX ADMIN — SERTRALINE HYDROCHLORIDE 25 MG: 25 TABLET ORAL at 09:05

## 2020-05-09 RX ADMIN — OXYCODONE HYDROCHLORIDE AND ACETAMINOPHEN 500 MG: 500 TABLET ORAL at 09:05

## 2020-05-09 RX ADMIN — CALCIUM 500 MG: 500 TABLET ORAL at 09:05

## 2020-05-09 RX ADMIN — Medication 400 MG: at 09:05

## 2020-05-09 RX ADMIN — LEVOTHYROXINE SODIUM 88 MCG: 88 TABLET ORAL at 06:05

## 2020-05-09 RX ADMIN — FOLIC ACID-PYRIDOXINE-CYANOCOBALAMIN TAB 2.5-25-2 MG 1 TABLET: 2.5-25-2 TAB at 09:05

## 2020-05-09 RX ADMIN — APIXABAN 5 MG: 2.5 TABLET, FILM COATED ORAL at 09:05

## 2020-05-09 RX ADMIN — FERROUS SULFATE TAB EC 325 MG (65 MG FE EQUIVALENT) 325 MG: 325 (65 FE) TABLET DELAYED RESPONSE at 09:05

## 2020-05-09 RX ADMIN — ASPIRIN 81 MG: 81 TABLET, COATED ORAL at 09:05

## 2020-05-09 RX ADMIN — DOCUSATE SODIUM 100 MG: 100 CAPSULE, LIQUID FILLED ORAL at 09:05

## 2020-05-09 NOTE — PT/OT/SLP PROGRESS
Physical Therapy Treatment    Patient Name:  Blaire Cage   MRN:  [id number]    Recommendations:     Discharge Recommendations:  home, home with home health, home health PT   Discharge Equipment Recommendations: walker, rolling   Barriers to discharge: limited mobility    Assessment:     Blaire Cage is a 89 y.o. female admitted with a medical diagnosis of Dizziness.  She presents with the following impairments/functional limitations:  impaired endurance, impaired functional mobilty, gait instability, decreased lower extremity function, impaired balance  .    Rehab Prognosis: Good and Fair; patient would benefit from acute skilled PT services to address these deficits and reach maximum level of function.    Recent Surgery: * No surgery found *      Plan:     During this hospitalization, patient to be seen 6 x/week to address the identified rehab impairments via gait training, therapeutic activities, therapeutic exercises and progress toward the following goals:    · Plan of Care Expires:  05/31/20    Subjective     Chief Complaint: left foot pain with wt bearing  Patient/Family Comments/goals: agrees to work with PT  Pain/Comfort:  · Pain Rating 1: 6/10  · Location - Side 1: Left  · Location 1: foot  · Pain Addressed 1: Cessation of Activity, Reposition  · Pain Rating Post-Intervention 1: 6/10      Objective:     Communicated with nurse (Kay)  prior to session.  Patient found HOB elevated with peripheral IV upon PT entry to room.     General Precautions: Standard, fall   Orthopedic Precautions:(left foot pain with wt bearing)   Braces:       Functional Mobility training:  · Bed Mobility:     · Rolling Right: modified independence  · Supine to Sit: modified independence and  x 2 reps  · Sit to Supine: modified independence and  x 2 reps  · Transfers:     · Sit to Stand:  contact guard assistance with rolling walker and  x 4 reps      AM-PAC 6 CLICK MOBILITY  Turning over in bed (including adjusting  bedclothes, sheets and blankets)?: 3  Sitting down on and standing up from a chair with arms (e.g., wheelchair, bedside commode, etc.): 3  Moving from lying on back to sitting on the side of the bed?: 3  Moving to and from a bed to a chair (including a wheelchair)?: 3  Need to walk in hospital room?: 1  Climbing 3-5 steps with a railing?: 1  Basic Mobility Total Score: 14       Therapeutic Activities and Exercises:   SUPINE: SLR x 10 reps x 2 sets each  SEATED: LAQ x 10 reps each  Stand with RW NWB LLE x 1 min x 4 reps with CG/SBA  Measured orthostatic BP (sent to Epic)    Patient left HOB elevated with all lines intact, call button in reach and bed alarm on..    GOALS:   Multidisciplinary Problems     Physical Therapy Goals        Problem: Physical Therapy Goal    Goal Priority Disciplines Outcome Goal Variances Interventions   Physical Therapy Goal     PT, PT/OT Ongoing, Progressing     Description:  Goals to be met by: 2020     Patient will increase functional independence with mobility by performin). Supine to sit with Modified [place]  2). Sit to supine with Modified [place]  3). Sit to stand transfer with Modified [place]  4). Bed to chair transfer with Modified [place] using Rolling Walker  5). Gait  X > 50  feet with Modified [place] using Rolling Walker.                       Time Tracking:     PT Received On: 20  PT Start Time: 0830     PT Stop Time: 0855  PT Total Time (min): 25 min     Billable Minutes: Therapeutic Activity 25    Treatment Type: Treatment  PT/PTA: PT     PTA Visit Number: 0     Osvaldo Rodrigues, PT  2020

## 2020-05-09 NOTE — PROGRESS NOTES
"[facility]  Neurology  Consult Note    Patient Name: Blaire Cage  MRN: [id number]  Admission Date: 5/7/2020  Hospital Length of Stay: 0 days  Code Status: Full Code   Attending Provider: Dr Huston  Consulting Provider: Dr Morris  Primary Care Physician: Eli Edmonds MD  Principal Problem:Dizziness      Subjective:     Chief Complaint:  dizziness    HPI from EMR: Patient is a 59-year-old female with past medical history significant for HTN, COPD, T2DM, hx of PE, aortic aneurysm, CHF, CKD 3-4, and anemia is being admitted to Hospital Medicine under observation from [facility] Emergency room with complaints of dizziness for 1 month. The patient states that she had an episode of dizziness 1 night ago, but states that she has been dealing with lightheadedness and dizziness for the last month. Patient states that she suffered this bout of dizziness when she was walking with her walker last night, but states that it subsided.  Patient denies any ill hearing difficulties.  No nausea or vomiting reported.  No new visual changes reported.  She states that "It's like a swirling for me." She also admits that she has some lightheadedness from sitting to standing. Patient also endorses having loss of appetite and some fatigue for the last 3 days. She also states that she saw her PCP today, who recommended she come to the ED for further evaluation.  Patient was seen in the ER 2 days earlier for similar complaints as well.  The patient denies headache, fever, chest pain, numbness, weakness, abdominal pain, SOB, or any other complaint at this time. The patient has a PSHx of hysterectomy, appendectomy, fracture surgery, and hernia repair.       Neurological Consult: Pt reports that she has been having dizziness , mainly triggered with movement. She denies nausea. She says that she has the episodes when she was younger also. She denies vision changes, headaches, and new weakness. The " episodes subside and last 5-10 minutes. She takes aspirin, eliquis, and statin at home. Pt seen and examined with Dr Morris and POC discussed. 5/9 Pt alert and oriented, follows commands.  She says she has not been having dizziness since she has been in the bed so much.   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 8/7/2018    Hip arthritis 3/1/2016    Homocysteinemia 8/7/2018    Hyperlipidemia     Hypertension     Normocytic normochromic anemia 6/29/2018    PE (pulmonary thromboembolism) 06/09/2018    Pneumonia of right lower lobe due to infectious organism 9/11/2017    Thyroid disease     hypothyroid    Type 2 diabetes mellitus with stage 3 chronic kidney disease 1/18/2013       Past Surgical History:   Procedure Laterality Date    APPENDECTOMY      CHOLECYSTECTOMY      FRACTURE SURGERY      right hip     HERNIA REPAIR      groin    HYSTERECTOMY      INSERTION OF IMPLANTABLE LOOP RECORDER N/A 12/12/2018    Procedure: Insertion, Implantable Loop Recorder;  Surgeon: Ashok Herbert MD;  Location: [facility] CATH LAB;  Service: Cardiology;  Laterality: N/A;    PERCUTANEOUS PINNING OF HIP Left 3/23/2019    Procedure: PINNING, HIP, PERCUTANEOUS;  Surgeon: Jorje Hamlin MD;  Location: [facility] OR;  Service: Orthopedics;  Laterality: Left;    VARICOSE VEIN SURGERY         Review of patient's allergies indicates:   Allergen Reactions    Carvedilol Other (See Comments)     Bradycardia and syncope    Boniva [ibandronate]      Codeine     Hydralazine analogues     Iodinated contrast media Hives    Kionex [sodium polystyrene sulfonate] Diarrhea     From encounter 12/11/17 for diarrhea--not true allergy.    Lisinopril     Morphine        Current Neurological Medications: asa, eliquis, statin    No current facility-administered medications on file prior to encounter.      Current Outpatient Medications on File Prior to Encounter   Medication Sig    acetaminophen (TYLENOL) 325 MG tablet Take 2 tablets (650 mg total) by mouth every 4 (four) hours as needed. (Patient taking differently: Take 650 mg by mouth every 4 (four) hours as needed for Pain. )    albuterol-ipratropium (DUO-NEB) 2.5 mg-0.5 mg/3 mL nebulizer solution Take 3 mLs by nebulization every 6 (six) hours as needed for Wheezing. Rescue    amLODIPine (NORVASC) 5 MG tablet Take 1 tablet (5 mg total) by mouth 2 (two) times daily.    apixaban (ELIQUIS) 5 mg Tab Take 1 tablet (5 mg total) by mouth 2 (two) times daily.    ascorbic acid (VITAMIN C) 500 MG tablet Take 500 mg by mouth once daily.      aspirin (ECOTRIN) 81 MG EC tablet Take 81 mg by mouth once daily.      calcium carbonate (OS-TASIA) 500 mg calcium (1,250 mg) tablet Take 1 tablet by mouth 2 (two) times daily.      cyanocobalamin/folic acid (FOLTRATE ORAL) vitamin B12-folic acid   2.5 - 25-2MG    docusate sodium (COLACE) 100 MG capsule Take 1 capsule (100 mg total) by mouth 2 (two) times daily.    ferrous sulfate (FEOSOL) 325 mg (65 mg iron) Tab tablet Take 1 tablet (325 mg total) by mouth 2 (two) times daily with meals.    fish oil-omega-3 fatty acids 300-1,000 mg capsule Take 2 g by mouth every evening.     fluocinolone (SYNALAR) 0.01 % external solution Apply topically 2 (two) times daily.    fluticasone (FLONASE) 50 mcg/actuation nasal spray 2 sprays by Each Nare route once daily.    fluticasone-umeclidin-vilanter (TRELEGY ELLIPTA) 100-62.5-25 mcg DsDv Inhale 1 puff into the lungs once daily.    folic  acid-vit B6-vit B12 2.5-25-2 mg (FOLBIC) 2.5-25-2 mg Tab Take 1 tablet by mouth once daily.    furosemide (LASIX) 20 MG tablet Take 1 tablet only as needed, for swelling, increase SOB, weight gain of 3 lbs overnight or 5 lbs for the week    gabapentin (NEURONTIN) 300 MG capsule Take 1 capsule (300 mg total) by mouth every evening.    levothyroxine (SYNTHROID) 88 MCG tablet Take 88 mcg by mouth once daily.    lidocaine (LIDODERM) 5 % Place 1 patch onto the skin once daily. Remove & Discard patch within 12 hours or as directed by MD    magnesium oxide (MAG-OX) 400 mg (241.3 mg magnesium) tablet TAKE 1 TABLET BY MOUTH ONCE DAILY    montelukast (SINGULAIR) 10 mg tablet TAKE 1 TABLET BY MOUTH ONCE DAILY IN THE EVENING    multivitamin (THERAGRAN) tablet Take 1 tablet by mouth once daily.    mupirocin (BACTROBAN) 2 % ointment Apply topically 2 (two) times daily.    nitroGLYCERIN (NITROSTAT) 0.4 MG SL tablet Place 1 tablet (0.4 mg total) under the tongue every 5 (five) minutes as needed for Chest pain. As needed (Patient taking differently: Place 0.4 mg under the tongue every 5 (five) minutes as needed for Chest pain. Seek medical help if pain is not relieved after the third dose.)    predniSONE (DELTASONE) 20 MG tablet Take one daily for 3 days and repeat if needed.    sertraline (ZOLOFT) 25 MG tablet Take 1 tablet (25 mg total) by mouth once daily.    simvastatin (ZOCOR) 40 MG tablet TAKE 1 TABLET BY MOUTH ONCE DAILY IN THE EVENING    traMADoL (ULTRAM) 50 mg tablet Take 1 tablet (50 mg total) by mouth every 8 (eight) hours as needed for Pain.    vitamin D 1000 units Tab Take 1 tablet (1,000 Units total) by mouth once daily.      Family History     Problem Relation (Age of Onset)    Diabetes Sister, Brother    Heart disease Mother    Hypertension Mother    Kidney disease Son    Lung disease Father        Tobacco Use    Smoking status: Former Smoker     Packs/day: 0.35     Years: 44.00     Pack years: 15.40      Types: Cigarettes     Last attempt to quit: 2015     Years since quittin.0    Smokeless tobacco: Never Used    Tobacco comment: 2015   Substance and Sexual Activity    Alcohol use: No     Alcohol/week: 0.0 standard drinks     Frequency: Never     Binge frequency: Never    Drug use: No    Sexual activity: Never     Review of Systems   Constitutional: Negative.    HENT: Negative.    Eyes: Negative.    Respiratory: Negative.    Cardiovascular: Negative.    Gastrointestinal: Negative.    Endocrine: Negative.    Genitourinary: Negative.    Musculoskeletal: Negative.    Skin: Negative.    Allergic/Immunologic: Negative.    Neurological: Positive for dizziness.   Hematological: Negative.    Psychiatric/Behavioral: Negative.      Objective:     Vital Signs (Most Recent):  Temp: 97.9 °F (36.6 °C) (20 1141)  Pulse: (!) 56 (20 1141)  Resp: 16 (20 1141)  BP: (!) 159/70 (20 1141)  SpO2: 97 % (20 1141) Vital Signs (24h Range):  Temp:  [97.5 °F (36.4 °C)-98.6 °F (37 °C)] 97.9 °F (36.6 °C)  Pulse:  [52-73] 56  Resp:  [16-18] 16  SpO2:  [94 %-97 %] 97 %  BP: (124-182)/(58-82) 159/70     Weight: 54.9 kg (121 lb)  Body mass index is 20.77 kg/m².    Physical Exam   Constitutional: She is oriented to person, place, and time. She appears well-developed and well-nourished.   HENT:   Head: Normocephalic.   Eyes: Pupils are equal, round, and reactive to light. EOM are normal.   Neck: Normal range of motion.   Cardiovascular: Normal rate.   Pulmonary/Chest: Effort normal.   Abdominal: Soft.   Musculoskeletal: Normal range of motion.   Neurological: She is alert and oriented to person, place, and time. She has a normal Finger-Nose-Finger Test.   Skin: Skin is warm and dry.   Psychiatric: Her speech is normal.       NEUROLOGICAL EXAMINATION:     MENTAL STATUS   Oriented to person, place, and time.   Follows 1 step commands.   Attention: normal. Concentration: normal.   Speech: speech is  normal   Level of consciousness: alert  Able to name object. Able to repeat.     CRANIAL NERVES     CN II   Visual fields full to confrontation.     CN III, IV, VI   Pupils are equal, round, and reactive to light.  Extraocular motions are normal.     CN V   Facial sensation intact.   Left facial sensation deficit: slightly decreaed.    CN VIII   Hearing: intact    CN XI   CN XI normal.     CN XII   CN XII normal.     MOTOR EXAM   Muscle bulk: normal  Overall muscle tone: normal  Right arm tone: normal  Left arm tone: normal       3-4/5 strength BL extremities     SENSORY EXAM   Light touch normal.     GAIT AND COORDINATION      Coordination   Finger to nose coordination: normal      Significant Labs:  CMP  Sodium   Date Value Ref Range Status   05/09/2020 138 136 - 145 mmol/L Final     Potassium   Date Value Ref Range Status   05/09/2020 4.3 3.5 - 5.1 mmol/L Final     Chloride   Date Value Ref Range Status   05/09/2020 108 95 - 110 mmol/L Final     CO2   Date Value Ref Range Status   05/09/2020 23 23 - 29 mmol/L Final     Glucose   Date Value Ref Range Status   05/09/2020 116 (H) 70 - 110 mg/dL Final     BUN, Bld   Date Value Ref Range Status   05/09/2020 50 (H) 8 - 23 mg/dL Final     Creatinine   Date Value Ref Range Status   05/09/2020 1.5 (H) 0.5 - 1.4 mg/dL Final   03/04/2013 1.1 0.5 - 1.4 mg/dL Final     Calcium   Date Value Ref Range Status   05/09/2020 8.8 8.7 - 10.5 mg/dL Final   03/04/2013 9.8 8.7 - 10.5 mg/dL Final     Total Protein   Date Value Ref Range Status   05/09/2020 5.7 (L) 6.0 - 8.4 g/dL Final     Albumin   Date Value Ref Range Status   05/09/2020 2.9 (L) 3.5 - 5.2 g/dL Final     Total Bilirubin   Date Value Ref Range Status   05/09/2020 0.3 0.1 - 1.0 mg/dL Final     Comment:     For infants and newborns, interpretation of results should be based  on gestational age, weight and in agreement with clinical  observations.  Premature Infant recommended reference ranges:  Up to 24  hours.............<8.0 mg/dL  Up to 48 hours............<12.0 mg/dL  3-5 days..................<15.0 mg/dL  6-29 days.................<15.0 mg/dL       Alkaline Phosphatase   Date Value Ref Range Status   05/09/2020 65 55 - 135 U/L Final   03/04/2013 60 55 - 135 U/L Final     AST   Date Value Ref Range Status   05/09/2020 19 10 - 40 U/L Final   03/04/2013 21 10 - 40 U/L Final     ALT   Date Value Ref Range Status   05/09/2020 14 10 - 44 U/L Final     Anion Gap   Date Value Ref Range Status   05/09/2020 7 (L) 8 - 16 mmol/L Final   03/04/2013 10 5 - 15 meq/L Final     eGFR if    Date Value Ref Range Status   05/09/2020 35 (A) >60 mL/min/1.73 m^2 Final     eGFR if non    Date Value Ref Range Status   05/09/2020 31 (A) >60 mL/min/1.73 m^2 Final     Comment:     Calculation used to obtain the estimated glomerular filtration  rate (eGFR) is the CKD-EPI equation.        Lab Results   Component Value Date    WBC 6.60 05/08/2020    HGB 8.9 (L) 05/08/2020    HCT 28.2 (L) 05/08/2020    MCV 98 05/08/2020     05/08/2020     Lab Results   Component Value Date    CHOL 154 05/09/2020    CHOL 186 12/14/2019    CHOL 149 05/23/2017     Lab Results   Component Value Date    HDL 54 05/09/2020    HDL 58 12/14/2019    HDL 52 05/23/2017     Lab Results   Component Value Date    LDLCALC 83.0 05/09/2020    LDLCALC 105.0 12/14/2019    LDLCALC 78.2 05/23/2017     Lab Results   Component Value Date    TRIG 85 05/09/2020    TRIG 115 12/14/2019    TRIG 94 05/23/2017     Lab Results   Component Value Date    CHOLHDL 35.1 05/09/2020    CHOLHDL 31.2 12/14/2019    CHOLHDL 34.9 05/23/2017     Lab Results   Component Value Date    HGBA1C 5.7 (H) 05/08/2020           Significant Imaging:   US Carotid Bilateral  Narrative: EXAMINATION:  US CAROTID BILATERAL    CLINICAL HISTORY:  dizziness;    TECHNIQUE:  Grayscale and color Doppler ultrasound examination of the carotid and vertebral artery systems bilaterally.   Stenosis estimates are per the NASCET measurement criteria.    COMPARISON:  Prior carotid study of May 23, 2017    FINDINGS:  Right:    Internal Carotid Artery (ICA) peak systolic velocity 110 cm/sec    ICA/CCA peak systolic velocity ratio: 1.2    Plaque formation: Moderate calcified atherosclerotic plaque noted    Vertebral artery: Antegrade flow and normal waveform.    Left:    Internal Carotid Artery (ICA)  peak systolic velocity 127 cm/sec    ICA/CCA peak systolic velocity ratio: 1.0    Plaque formation: Moderate calcified atherosclerotic plaque noted.    Vertebral artery: Antegrade flow and normal waveform.  Impression: No evidence of a hemodynamically significant carotid bifurcation stenosis.  There is moderate calcified atherosclerotic plaque seen in both carotid bifurcations.    Electronically signed by: Cesario Lan MD  Date:    05/08/2020  Time:    17:36  Echo Color Flow Doppler? Yes; Bubble Contrast? Yes  · Concentric left ventricular hypertrophy.  · The mean diastolic gradient across the mitral valve is 5 mmHg at a heart   rate of 57 bpm.  · Normal left ventricular systolic function. The estimated ejection   fraction is 65%.  · Grade II (moderate) left ventricular diastolic dysfunction consistent   with pseudonormalization.  · Normal right ventricular systolic function.  · Severe left atrial enlargement.  · Mild right atrial enlargement.  · Mild mitral regurgitation.  · Mild to moderate tricuspid regurgitation.  · Normal central venous pressure (3 mmHg).  · The estimated PA systolic pressure is 50 mmHg.  · Pulmonary hypertension present.  · No PFO on color flow or saline bubble study     MRA Brain without contrast  Narrative: EXAMINATION:  MRA BRAIN WITHOUT CONTRAST    CLINICAL HISTORY:  Dizziness; .    TECHNIQUE:  Non-contrast 3-D time-of-flight intracranial MR angiography was performed through the [place] [facility] [place] with MIP reformatting.    COMPARISON:  None.    FINDINGS:  The internal carotid  arteries are of normal caliber.  There are no areas of atherosclerotic narrowing. Incidental note is made of a hypoplastic A1 segment of the right GRACY, a normal congenital variant.  Otherwise, the middle and anterior cerebral arteries are patent and appear within normal limits.  Right vertebral artery is dominant.  The vertebral arteries are patent. The basilar artery is normal.  The posterior cerebral arteries are normal.  There is no aneurysm or vascular malformation identified.  Although MRA is a screening examination, catheter angiography remains the definitive study for small aneurysms, vasculitis, and other vascular abnormalities.  Impression: No high-grade stenosis, large vessel occlusion, or aneurysm.    Electronically signed by: Renzo Miller  Date:    05/08/2020  Time:    15:51  MRI Brain Without Contrast  Narrative: EXAMINATION:  MRI BRAIN WITHOUT CONTRAST    CLINICAL HISTORY:  Dizziness;.    TECHNIQUE:  Multiplanar multisequence MR imaging of the brain was performed without the administration of intravenous contrast.    COMPARISON:  CT head 05/05/2020    FINDINGS:  Study is mildly degraded by patient motion artifact.  Prominent susceptibility artifact related to dental amalgam.    Intracranial compartment:    Prominence of the ventricles and sulci compatible with mild generalized cerebral volume loss.  No hydrocephalus. No extra-axial blood or fluid collections.    Scattered areas of T2/FLAIR hyperintense signal involving the periventricular and deep cerebral white matter, which are nonspecific but likely represent a moderate degree of chronic microvascular ischemic change.  Chronic inferior left cerebellar hemisphere infarct.  Additional small chronic paramedian right parietal lobe infarct near the vertex.  Diffusion-weighted images demonstrate no evidence of an acute infarct.   Susceptibility weighted images demonstrate no evidence of acute or chronic hemorrhage.    Normal vascular flow voids are  preserved.    Skull/extracranial contents (limited evaluation): Bone marrow signal intensity is normal. Mild mucosal thickening in the paranasal sinuses.  Bilateral lens replacements.  Impression: 1. No evidence of an acute intracranial abnormality.  2. Moderate generalized cerebral volume loss and chronic ischemic changes, as above.    Electronically signed by: Renzo Miller  Date:    05/08/2020  Time:    15:46      US Carotid Bilateral  Narrative: EXAMINATION:  US CAROTID BILATERAL    CLINICAL HISTORY:  dizziness;    TECHNIQUE:  Grayscale and color Doppler ultrasound examination of the carotid and vertebral artery systems bilaterally.  Stenosis estimates are per the NASCET measurement criteria.    COMPARISON:  Prior carotid study of May 23, 2017    FINDINGS:  Right:    Internal Carotid Artery (ICA) peak systolic velocity 110 cm/sec    ICA/CCA peak systolic velocity ratio: 1.2    Plaque formation: Moderate calcified atherosclerotic plaque noted    Vertebral artery: Antegrade flow and normal waveform.    Left:    Internal Carotid Artery (ICA)  peak systolic velocity 127 cm/sec    ICA/CCA peak systolic velocity ratio: 1.0    Plaque formation: Moderate calcified atherosclerotic plaque noted.    Vertebral artery: Antegrade flow and normal waveform.  Impression: No evidence of a hemodynamically significant carotid bifurcation stenosis.  There is moderate calcified atherosclerotic plaque seen in both carotid bifurcations.    Electronically signed by: Cesario Lan MD  Date:    05/08/2020  Time:    17:36  Echo Color Flow Doppler? Yes; Bubble Contrast? Yes  · Concentric left ventricular hypertrophy.  · The mean diastolic gradient across the mitral valve is 5 mmHg at a heart   rate of 57 bpm.  · Normal left ventricular systolic function. The estimated ejection   fraction is 65%.  · Grade II (moderate) left ventricular diastolic dysfunction consistent   with pseudonormalization.  · Normal right ventricular systolic  function.  · Severe left atrial enlargement.  · Mild right atrial enlargement.  · Mild mitral regurgitation.  · Mild to moderate tricuspid regurgitation.  · Normal central venous pressure (3 mmHg).  · The estimated PA systolic pressure is 50 mmHg.  · Pulmonary hypertension present.  · No PFO on color flow or saline bubble study     MRA Brain without contrast  Narrative: EXAMINATION:  MRA BRAIN WITHOUT CONTRAST    CLINICAL HISTORY:  Dizziness; .    TECHNIQUE:  Non-contrast 3-D time-of-flight intracranial MR angiography was performed through the [place] [facility] [place] with MIP reformatting.    COMPARISON:  None.    FINDINGS:  The internal carotid arteries are of normal caliber.  There are no areas of atherosclerotic narrowing. Incidental note is made of a hypoplastic A1 segment of the right GRACY, a normal congenital variant.  Otherwise, the middle and anterior cerebral arteries are patent and appear within normal limits.  Right vertebral artery is dominant.  The vertebral arteries are patent. The basilar artery is normal.  The posterior cerebral arteries are normal.  There is no aneurysm or vascular malformation identified.  Although MRA is a screening examination, catheter angiography remains the definitive study for small aneurysms, vasculitis, and other vascular abnormalities.  Impression: No high-grade stenosis, large vessel occlusion, or aneurysm.    Electronically signed by: Renzo Miller  Date:    05/08/2020  Time:    15:51  MRI Brain Without Contrast  Narrative: EXAMINATION:  MRI BRAIN WITHOUT CONTRAST    CLINICAL HISTORY:  Dizziness;.    TECHNIQUE:  Multiplanar multisequence MR imaging of the brain was performed without the administration of intravenous contrast.    COMPARISON:  CT head 05/05/2020    FINDINGS:  Study is mildly degraded by patient motion artifact.  Prominent susceptibility artifact related to dental amalgam.    Intracranial compartment:    Prominence of the ventricles and sulci compatible with mild  generalized cerebral volume loss.  No hydrocephalus. No extra-axial blood or fluid collections.    Scattered areas of T2/FLAIR hyperintense signal involving the periventricular and deep cerebral white matter, which are nonspecific but likely represent a moderate degree of chronic microvascular ischemic change.  Chronic inferior left cerebellar hemisphere infarct.  Additional small chronic paramedian right parietal lobe infarct near the vertex.  Diffusion-weighted images demonstrate no evidence of an acute infarct.   Susceptibility weighted images demonstrate no evidence of acute or chronic hemorrhage.    Normal vascular flow voids are preserved.    Skull/extracranial contents (limited evaluation): Bone marrow signal intensity is normal. Mild mucosal thickening in the paranasal sinuses.  Bilateral lens replacements.  Impression: 1. No evidence of an acute intracranial abnormality.  2. Moderate generalized cerebral volume loss and chronic ischemic changes, as above.    Electronically signed by: Renzo Miller  Date:    05/08/2020  Time:    15:46      Assessment and Plan:    Dizziness  -Rule out TIA  -CT head: no acute process  -MRI brain-noted above  -MRA brain-noted above  -CUS-noted above  -Lipids-chol 154, ldl 83  -A1c-5.7  -eliquis, aspirin, statin    DB  -A1c pending  -IM managing    Hypothyroid  -TSH 0.888  -levothyroxine  -IM managing    HTN  -IM managing    Hypoglycemia  -A1c pendingg  -IM managing    No central cause of dizziness. Continue aspirin, eliquis, and statin. Rec outpatient ENT to assess peripheral cause of dizziness.      Patient to follow up with [facility] at [phone] within 2 weeks from discharge.                              Active Diagnoses:    Diagnosis Date Noted POA    PRINCIPAL PROBLEM:  Dizziness [R42] 05/07/2020 Yes    CKD (chronic kidney disease), stage IV [N18.4] 05/07/2020 Yes    Hypoglycemia [E16.2] 02/14/2020 Yes    Diabetic neuropathy, type II diabetes mellitus  [E11.40] 01/18/2013 Yes    Hypothyroidism [E03.9] 01/18/2013 Yes    Hypertension associated with diabetes [E11.59, I10] 01/18/2013 Yes    Hyperlipidemia associated with type 2 diabetes mellitus [E11.69, E78.5] 01/18/2013 Yes    Type 2 diabetes mellitus with stage 4 chronic kidney disease [E11.22, N18.4] 01/18/2013 Yes      Problems Resolved During this Admission:       VTE Risk Mitigation (From admission, onward)         Ordered     IP VTE HIGH RISK PATIENT  Once      05/07/20 2128     Place sequential compression device  Until discontinued      05/07/20 2128     Place JIMMY hose  Until discontinued      05/07/20 2128     apixaban tablet 5 mg  2 times daily      05/07/20 1840                Thank you for your consult.     Bethanie Sorensen NP  Neurology  [facility]

## 2020-05-09 NOTE — PLAN OF CARE
Pt AAO, VSS. Plan of care reviewed with pt at beginning of shift.  Pt/family verbalized understanding. Pt denies pain. Cardiac monitor in tact showing NSR. Blood glucose monitoring in progress. IV fluids infusing. Hourly/ Q2 hourly rounds completed on this pt throughout shift.  Pain monitored, restroom offered, repositions independently, safety maintained.  Patient has remained free from fall/injury, no skin breakdown noted.  Side rails up x2, bed in locked and lowest position, call light kept within reach.  Needs attended to, will continue to monitor/ update as indicated.

## 2020-05-09 NOTE — CARE UPDATE
05/09/20 0807   Patient Assessment/Suction   Level of Consciousness (AVPU) alert   PRE-TX-O2   O2 Device (Oxygen Therapy) room air   SpO2 96 %   Pulse Oximetry Type Intermittent   $ Pulse Oximetry - Multiple Charge Pulse Oximetry - Multiple   Pulse 70   Resp 16   Aerosol Therapy   $ Aerosol Therapy Charges PRN treatment not required   Respiratory Treatment Status (SVN) PRN treatment not required

## 2020-05-09 NOTE — PLAN OF CARE
05/09/20 1525   Final Note   Assessment Type Final Discharge Note   Anticipated Discharge Disposition Home   Right Care Referral Info   Post Acute Recommendation No Care

## 2020-05-09 NOTE — PLAN OF CARE
Problem: Physical Therapy Goal  Goal: Physical Therapy Goal  Description  Goals to be met by: 2020     Patient will increase functional independence with mobility by performin). Supine to sit with Modified Clifford  2). Sit to supine with Modified Clifford  3). Sit to stand transfer with Modified Clifford  4). Bed to chair transfer with Modified Clifford using Rolling Walker  5). Gait  X > 50  feet with Modified Clifford using Rolling Walker.      Outcome: Ongoing, Progressing

## 2020-05-11 NOTE — DISCHARGE SUMMARY
"[facility] Medicine  Discharge Summary      Patient Name: Blaire Cage  MRN: [id number]  Admission Date: 5/7/2020  Hospital Length of Stay: 0 days  Discharge Date and Time:  05/11/2020 9:17 AM  Attending Physician: No att. providers found   Discharging Provider: oBbo Hooker MD  Primary Care Provider: Eli Edmonds MD      HPI:   Patient is a 59-year-old female with past medical history significant for HTN, COPD, T2DM, hx of PE, aortic aneurysm, CHF, CKD 3-4, and anemia is being admitted to Hospital Medicine under observation from [facility] Emergency room with complaints of dizziness for 1 month. The patient states that she had an episode of dizziness 1 night ago, but states that she has been dealing with lightheadedness and dizziness for the last month. Patient states that she suffered this bout of dizziness when she was walking with her walker last night, but states that it subsided.  Patient denies any ill hearing difficulties.  No nausea or vomiting reported.  No new visual changes reported.  She states that "It's like a swirling for me." She also admits that she has some lightheadedness from sitting to standing. Patient also endorses having loss of appetite and some fatigue for the last 3 days. She also states that she saw her PCP today, who recommended she come to the ED for further evaluation.  Patient was seen in the ER 2 days earlier for similar complaints as well.  The patient denies headache, fever, chest pain, numbness, weakness, abdominal pain, SOB, or any other complaint at this time. The patient has a PSHx of hysterectomy, appendectomy, fracture surgery, and hernia repair.     * No surgery found *      Hospital Course:   Her hospital course was relatively uneventful relating to her presenting complaint.  She did have few episodes of hypoglycemia the resolved with p.r.n.s. neurologic workup was not suggestive of any central causes of " dizziness.  Following resolution of symptoms neurology recommended patient establish care with ENT as an outpatient.  Referral made at time of discharge.     Consults:     No new Assessment & Plan notes have been filed under this hospital service since the last note was generated.  Service: Hospital Medicine    Final Active Diagnoses:    Diagnosis Date Noted POA    PRINCIPAL PROBLEM:  Dizziness [R42] 05/07/2020 Yes    CKD (chronic kidney disease), stage IV [N18.4] 05/07/2020 Yes    Hypoglycemia [E16.2] 02/14/2020 Yes    Diabetic neuropathy, type II diabetes mellitus [E11.40] 01/18/2013 Yes    Hypothyroidism [E03.9] 01/18/2013 Yes    Hypertension associated with diabetes [E11.59, I10] 01/18/2013 Yes    Hyperlipidemia associated with type 2 diabetes mellitus [E11.69, E78.5] 01/18/2013 Yes    Type 2 diabetes mellitus with stage 4 chronic kidney disease [E11.22, N18.4] 01/18/2013 Yes      Problems Resolved During this Admission:       Discharged Condition: fair    Disposition: Home or Self Care    Follow Up:  Follow-up Information     Eli Edmonds MD In 1 week.    Specialty:  Family Medicine  Contact information:  [address] [facility] [address]  [phone]                 Patient Instructions:      Ambulatory referral/consult to ENT   Standing Status: Future   Referral Priority: Routine Referral Type: Consultation   Referral Reason: Specialty Services Required   Requested Specialty: Otolaryngology   Number of Visits Requested: 1       Significant Diagnostic Studies: Labs:   CMP No results for input(s): NA, K, CL, CO2, GLU, BUN, CREATININE, CALCIUM, PROT, ALBUMIN, BILITOT, ALKPHOS, AST, ALT, ANIONGAP, ESTGFRAFRICA, EGFRNONAA in the last 48 hours., CBC No results for input(s): WBC, HGB, HCT, PLT in the last 48 hours., INR   Lab Results   Component Value Date    INR 1.0 03/22/2019    INR 1.0 09/11/2017    INR 0.9 08/24/2016    and Lipid Panel   Lab Results   Component Value Date    CHOL 154 05/09/2020    HDL  54 05/09/2020    LDLCALC 83.0 05/09/2020    TRIG 85 05/09/2020    CHOLHDL 35.1 05/09/2020     Radiology:   MRI brain 05/08/2020:  1. No evidence of an acute intracranial abnormality.  2. Moderate generalized cerebral volume loss and chronic ischemic changes, as above.  MRA brain 05/08/2020: No high-grade stenosis, large vessel occlusion, or aneurysm.  Carotid ultrasound 05/08/2020: No evidence of a hemodynamically significant carotid bifurcation stenosis.  There is moderate calcified atherosclerotic plaque seen in both carotid bifurcations.    Pending Diagnostic Studies:     None         Medications:  Reconciled Home Medications:      Medication List      CHANGE how you take these medications    acetaminophen 325 MG tablet  Commonly known as:  TYLENOL  Take 2 tablets (650 mg total) by mouth every 4 (four) hours as needed.  What changed:  reasons to take this     nitroGLYCERIN 0.4 MG SL tablet  Commonly known as:  NITROSTAT  Place 1 tablet (0.4 mg total) under the tongue every 5 (five) minutes as needed for Chest pain. As needed  What changed:  additional instructions        CONTINUE taking these medications    albuterol-ipratropium 2.5 mg-0.5 mg/3 mL nebulizer solution  Commonly known as:  DUO-NEB  Take 3 mLs by nebulization every 6 (six) hours as needed for Wheezing. Rescue     amLODIPine 5 MG tablet  Commonly known as:  NORVASC  Take 1 tablet (5 mg total) by mouth 2 (two) times daily.     apixaban 5 mg Tab  Commonly known as:  ELIQUIS  Take 1 tablet (5 mg total) by mouth 2 (two) times daily.     aspirin 81 MG EC tablet  Commonly known as:  ECOTRIN  Take 81 mg by mouth once daily.     calcium carbonate 500 mg calcium (1,250 mg) tablet  Commonly known as:  OS-TASIA  Take 1 tablet by mouth 2 (two) times daily.     docusate sodium 100 MG capsule  Commonly known as:  COLACE  Take 1 capsule (100 mg total) by mouth 2 (two) times daily.     ferrous sulfate 325 mg (65 mg iron) Tab tablet  Commonly known as:  FEOSOL  Take 1  tablet (325 mg total) by mouth 2 (two) times daily with meals.     fish oil-omega-3 fatty acids 300-1,000 mg capsule  Take 2 g by mouth every evening.     fluocinolone 0.01 % external solution  Commonly known as:  SYNALAR  Apply topically 2 (two) times daily.     fluticasone propionate 50 mcg/actuation nasal spray  Commonly known as:  FLONASE  2 sprays by Each Nare route once daily.     fluticasone-umeclidin-vilanter 100-62.5-25 mcg Dsdv  Commonly known as:  TRELEGY ELLIPTA  Inhale 1 puff into the lungs once daily.     folic acid-vit B6-vit B12 2.5-25-2 mg 2.5-25-2 mg Tab  Commonly known as:  FOLBIC  Take 1 tablet by mouth once daily.     FOLTRATE ORAL  vitamin B12-folic acid   2.5 - 25-2MG     furosemide 20 MG tablet  Commonly known as:  LASIX  Take 1 tablet only as needed, for swelling, increase SOB, weight gain of 3 lbs overnight or 5 lbs for the week     gabapentin 300 MG capsule  Commonly known as:  NEURONTIN  Take 1 capsule (300 mg total) by mouth every evening.     levothyroxine 88 MCG tablet  Commonly known as:  SYNTHROID  Take 88 mcg by mouth once daily.     lidocaine 5 %  Commonly known as:  LIDODERM  Place 1 patch onto the skin once daily. Remove & Discard patch within 12 hours or as directed by MD     magnesium oxide 400 mg (241.3 mg magnesium) tablet  Commonly known as:  MAG-OX  TAKE 1 TABLET BY MOUTH ONCE DAILY     montelukast 10 mg tablet  Commonly known as:  SINGULAIR  TAKE 1 TABLET BY MOUTH ONCE DAILY IN THE EVENING     multivitamin tablet  Commonly known as:  THERAGRAN  Take 1 tablet by mouth once daily.     mupirocin 2 % ointment  Commonly known as:  BACTROBAN  Apply topically 2 (two) times daily.     sertraline 25 MG tablet  Commonly known as:  ZOLOFT  Take 1 tablet (25 mg total) by mouth once daily.     simvastatin 40 MG tablet  Commonly known as:  ZOCOR  TAKE 1 TABLET BY MOUTH ONCE DAILY IN THE EVENING     traMADoL 50 mg tablet  Commonly known as:  ULTRAM  Take 1 tablet (50 mg total) by mouth  every 8 (eight) hours as needed for Pain.     VITAMIN C 500 MG tablet  Generic drug:  ascorbic acid (vitamin C)  Take 500 mg by mouth once daily.     vitamin D 1000 units Tab  Commonly known as:  VITAMIN D3  Take 1 tablet (1,000 Units total) by mouth once daily.        STOP taking these medications    predniSONE 20 MG tablet  Commonly known as:  DELTASONE            Indwelling Lines/Drains at time of discharge:   Lines/Drains/Airways     None                 Time spent on the discharge of patient: 30 minutes  Patient was seen and examined on the date of discharge and determined to be suitable for discharge.         Bobo Hooker MD  Department of Hospital Medicine  [facility]

## 2020-05-11 NOTE — HOSPITAL COURSE
Her hospital course was relatively uneventful relating to her presenting complaint.  She did have few episodes of hypoglycemia the resolved with p.r.n.s. neurologic workup was not suggestive of any central causes of dizziness.  Following resolution of symptoms neurology recommended patient establish care with ENT as an outpatient.  Referral made at time of discharge.

## 2020-05-11 NOTE — PROGRESS NOTES
[facility] Hematology/Oncology  PROGRESS NOTE - Telemedicine Visit        Subjective:       Patient ID:   NAME: Blaire Cage : 1930     89 y.o. female    Referring Doc: Sandro  Other Physicians: Cande Garg (PA); Eli Edmonds (PCP); eJffrey Christopher (Pulm); Sandra (GI)    Chief Complaint:  DVT and PE f/u    History of Present Illness:     Patient is being seen today via a telemedicine follow-up visit in lieu of a normal in-person visit due to the recent COVID19 outbreak. The patient is currently located at home. This visit type is a virtual visit with synchronous audio with video.        The patient is on today to go over the results of the recently ordered labs, tests and studies. She is on with her daughter. She is breathing ok. She denies any swelling in the legs. She denies any CP, SOB, HA's. No excessive bleeding or bruising.      She has some left foot pain issues and plans to see the podiatrist in near future.      Discussed COVID19 precautions    She went to ED recently because of some dizziness and possible vertigo and is going to be seeing ENT in naar future    She has seen Dr Flores with GI    ROS:   GEN: normal without any fever, night sweats or weight loss  HEENT: normal with no HA's, sore throat, stiff neck, changes in vision  CV: normal with no CP, SOB, PND, WEST or orthopnea  PULM: normal with no SOB, cough, hemoptysis, sputum or pleuritic pain  GI: some recent N/V, and diarrhea  : normal with no hematuria, dysuria  BREAST: normal with no mass, discharge, pain  SKIN: normal with no rash, erythema, bruising, or swelling    Allergies:  Review of patient's allergies indicates:   Allergen Reactions    Carvedilol Other (See Comments)     Bradycardia and syncope    Boniva [ibandronate]     Codeine     Hydralazine analogues     Iodinated contrast- oral and iv dye Hives    Kionex [sodium polystyrene sulfonate] Diarrhea     From encounter 17 for diarrhea--not true allergy.    Lisinopril      Morphine        Medications:    Current Outpatient Medications:     acetaminophen (TYLENOL) 325 MG tablet, Take 2 tablets (650 mg total) by mouth every 4 (four) hours as needed. (Patient taking differently: Take 650 mg by mouth every 4 (four) hours as needed for Pain. ), Disp: , Rfl: 0    albuterol-ipratropium (DUO-NEB) 2.5 mg-0.5 mg/3 mL nebulizer solution, Take 3 mLs by nebulization every 6 (six) hours as needed for Wheezing. Rescue, Disp: 120 vial, Rfl: 11    amLODIPine (NORVASC) 5 MG tablet, Take 1 tablet (5 mg total) by mouth 2 (two) times daily., Disp: 90 tablet, Rfl: 2    apixaban (ELIQUIS) 5 mg Tab, Take 1 tablet (5 mg total) by mouth 2 (two) times daily., Disp: 180 tablet, Rfl: 3    ascorbic acid (VITAMIN C) 500 MG tablet, Take 500 mg by mouth once daily.  , Disp: , Rfl:     aspirin (ECOTRIN) 81 MG EC tablet, Take 81 mg by mouth once daily.  , Disp: , Rfl:     calcium carbonate (OS-TASIA) 500 mg calcium (1,250 mg) tablet, Take 1 tablet by mouth 2 (two) times daily.  , Disp: , Rfl:     cyanocobalamin/folic acid (FOLTRATE ORAL), vitamin B12-folic acid  2.5 - 25-2MG, Disp: , Rfl:     docusate sodium (COLACE) 100 MG capsule, Take 1 capsule (100 mg total) by mouth 2 (two) times daily., Disp: 90 capsule, Rfl: 0    ferrous sulfate (FEOSOL) 325 mg (65 mg iron) Tab tablet, Take 1 tablet (325 mg total) by mouth 2 (two) times daily with meals., Disp: 180 tablet, Rfl: 3    fish oil-omega-3 fatty acids 300-1,000 mg capsule, Take 2 g by mouth every evening. , Disp: , Rfl:     fluocinolone (SYNALAR) 0.01 % external solution, Apply topically 2 (two) times daily., Disp: 90 mL, Rfl: 1    fluticasone (FLONASE) 50 mcg/actuation nasal spray, 2 sprays by Each Nare route once daily., Disp: 48 g, Rfl: 3    fluticasone-umeclidin-vilanter (TRELEGY ELLIPTA) 100-62.5-25 mcg DsDv, Inhale 1 puff into the lungs once daily., Disp: 60 each, Rfl: 11    folic acid-vit B6-vit B12 2.5-25-2 mg (FOLBIC) 2.5-25-2 mg Tab, Take 1  tablet by mouth once daily., Disp: 90 tablet, Rfl: 3    furosemide (LASIX) 20 MG tablet, Take 1 tablet only as needed, for swelling, increase SOB, weight gain of 3 lbs overnight or 5 lbs for the week, Disp: 24 tablet, Rfl: 3    gabapentin (NEURONTIN) 300 MG capsule, Take 1 capsule (300 mg total) by mouth every evening., Disp: 90 capsule, Rfl: 3    levothyroxine (SYNTHROID) 88 MCG tablet, Take 88 mcg by mouth once daily., Disp: , Rfl:     lidocaine (LIDODERM) 5 %, Place 1 patch onto the skin once daily. Remove & Discard patch within 12 hours or as directed by MD, Disp: 3 patch, Rfl: 0    magnesium oxide (MAG-OX) 400 mg (241.3 mg magnesium) tablet, TAKE 1 TABLET BY MOUTH ONCE DAILY, Disp: 90 tablet, Rfl: 3    montelukast (SINGULAIR) 10 mg tablet, TAKE 1 TABLET BY MOUTH ONCE DAILY IN THE EVENING, Disp: 90 tablet, Rfl: 1    multivitamin (THERAGRAN) tablet, Take 1 tablet by mouth once daily., Disp: , Rfl:     mupirocin (BACTROBAN) 2 % ointment, Apply topically 2 (two) times daily., Disp: 30 g, Rfl: 1    nitroGLYCERIN (NITROSTAT) 0.4 MG SL tablet, Place 1 tablet (0.4 mg total) under the tongue every 5 (five) minutes as needed for Chest pain. As needed (Patient taking differently: Place 0.4 mg under the tongue every 5 (five) minutes as needed for Chest pain. Seek medical help if pain is not relieved after the third dose.), Disp: 30 tablet, Rfl: 3    sertraline (ZOLOFT) 25 MG tablet, Take 1 tablet (25 mg total) by mouth once daily., Disp: 90 tablet, Rfl: 3    simvastatin (ZOCOR) 40 MG tablet, TAKE 1 TABLET BY MOUTH ONCE DAILY IN THE EVENING, Disp: 90 tablet, Rfl: 3    traMADoL (ULTRAM) 50 mg tablet, Take 1 tablet (50 mg total) by mouth every 8 (eight) hours as needed for Pain., Disp: 9 tablet, Rfl: 0    vitamin D 1000 units Tab, Take 1 tablet (1,000 Units total) by mouth once daily., Disp: 90 tablet, Rfl: 3    PMHx/PSHx Updates:  See patient's last visit with me on 1/15/2020  See H&P on  6/29/2018        Pathology:  Cancer Staging  No matching staging information was found for the patient.          Objective:     Vitals:  afebrile    Physical Examination:   GEN: no apparent distress, comfortable; AAOx3  HEAD: atraumatic and normocephalic  EYES: no conjunctival pallor or muddiness, no icterus; normal pupil reaction to ambient light  ENT: OMM, no pharyngeal erythema, external bilateral ears WNL; no visible thrush or ulcers  NECK: no masses or swelling, trachea midline, no visible LAD/LN's   CV: no palpitations; no pedal edema; no noticeable JVD or neck vein distension  CHEST: Normal respiratory effort; chest wall breath movements symmetrical; no audible wheezing  ABDOM: non-distended; no bloating  MUSC/Skeletal: ROM normal; joints visibly normal; no deformities or arthropathy  EXTREM: no clubbing, cyanosis, inflammation or swelling  SKIN: no rashes, lesions, ulcers, petechiae or subcutaneous nodules; vitiligo  : no carter  NEURO: moving all 4 extremities; AAOx3; no tremors  PSYCH: normal mood, affect and behavior  LYMPH: no visible LN's or LAD  LN's             Labs:     5/9/2020    Lab Results   Component Value Date    WBC 6.90 05/09/2020    HGB 8.7 (L) 05/09/2020    HCT 27.0 (L) 05/09/2020    MCV 97 05/09/2020     05/09/2020            BMP  Lab Results   Component Value Date     05/09/2020    K 4.3 05/09/2020     05/09/2020    CO2 23 05/09/2020    BUN 50 (H) 05/09/2020    CREATININE 1.5 (H) 05/09/2020    CALCIUM 8.8 05/09/2020    ANIONGAP 7 (L) 05/09/2020    ESTGFRAFRICA 35 (A) 05/09/2020    EGFRNONAA 31 (A) 05/09/2020        Lab Results   Component Value Date    IRON 32 05/07/2020    TIBC 300 05/07/2020    FERRITIN 153 02/24/2020     Lab Results   Component Value Date    DQSZJLJV58 >2000 (H) 05/07/2020     Lab Results   Component Value Date    FOLATE >40.0 (H) 05/07/2020           Radiology/Diagnostic Studies:    Ct Head Without Contrast    Result Date: 7/16/2018  EXAMINATION:  CT HEAD WITHOUT CONTRAST CLINICAL HISTORY: Dizziness; TECHNIQUE: 5 mm noncontrast axial images were acquired through the head. COMPARISON: CT head without contrast-11/29/2014 FINDINGS: The brain is normally formed with preserved gray-white matter junction differentiation. No evidence of acute/recent major vascular territory cerebral infarction, parenchymal hemorrhage, or intra-axial mass.  There is age-appropriate cerebral volume loss with associated compensatory enlargement of the ventricular system and widening of the CSF spaces over both cerebral convexities.  There are confluent areas of periventricular white matter hypoattenuation compatible with age-appropriate chronic microvascular ischemic changes. No hydrocephalus.  No effacement of the skull-base cisterns.  No extra-axial fluid collections or blood products. The paranasal sinuses and mastoid air cells are clear.  There is rightward bony nasal septal deviation.  The visualized orbits are unremarkable.  The bony calvarium and visualized facial bones show no acute abnormality.     No acute intracranial abnormality appreciated.  No interval detrimental change in the imaging appearance of the brain when compared to the previous study. Electronically signed by: Aubrey Davis MD Date:    07/16/2018 Time:    08:01    Radiology Report    Result Date: 7/15/2018  Ordered by an unspecified provider.      I have reviewed all available lab results and radiology reports.    Assessment/Plan:   (1) 89 y.o. female with diagnosis of a recent LLE DVT and Pulmonary emboli who has been referred by Dr Jo with Neph for evaluation by medical hematology/oncology. She was hospitalized [facility]. She has never had any clots before. No family hx/of clots.    - factor panel, protein C and S, ATIII adequate  - antiphosphatidyl negative; LA negative  - homocysteine elevated at 20.2  - MTHFR-C heterozygous positive - discussed the genetic implications with her and her daughter  -  prothrombin II negative  - she is on eliquis bid      (2) COPD/asthma; former smoker     (3) Left ventricular dysfunction     (4) HTN and hypercholesterolemia     (5) CRI - stage III     (6) Anemia - most likely a multifactorial process with iron deficiency diagnosis, anemia of chronic disorders, anemia of chronic renal  - latest hgb is low  at  8.7  - she is on iron and MVI  - iron panel and vitamins are adequate    (7) DM and hypothyroidism    (8) Recent hip fracture - left - needed pin placement only            History of pulmonary embolism    Deep vein thrombosis (DVT) of proximal vein of left lower extremity, unspecified chronicity    Chronic anticoagulation    Anemia, unspecified type    Anemia of chronic disease    Anemia due to multiple mechanisms    Anemia, chronic renal failure, stage 3 (moderate)    Iron deficiency anemia due to chronic blood loss    Macrocytic anemia    Normocytic normochromic anemia    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 Continue Folbic for the hyperhomocysteinemia  2. Continue eliquis for now and we may need to discontinue it if her anemia does not stabilize  3. Previously  discussed the genetic implications of the MTHFR-C in siblings and children  4. F/u with PCP, GI, neph etc  5. monitor labs monthly for now (encouraged compliance)  6. To see ENT in near future  7. Recommend that nephrology consider starting her on procrit    RTC in  4-6 weeks    Fax note to Eli Edmonds MD; Reva Jo    Discussion:       Total Time spent on patient:    I spent over 15 mins of time with the patient. Reviewed results of the recently ordered labs, tests, reports and studies; made directives with regards to the results. Over half of this time was spent couseling and coordinating care, making treatment and analytical decisions; ordering necessary labs, tests and studies; and discussing treatment options and setting  up treatment plan(s) if indicated.        COVID-19 Discussion:    I had long discussion with patient and any applicable family about the COVID-19 coronavirus epidemic and the recommended precautions with regard to cancer and/or hematology patients. I have re-iterated the CDC recommendations for adequate hand washing, use of hand -like products, and coughing into elbow, etc. In addition, especially for our patients who are on chemotherapy and/or our otherwise immunocompromised patients, I have recommended avoidance of crowds, including movie theaters, restaurants, churches, etc. I have recommended avoidance of any sick or symptomatic family members and/or friends. I have also recommended avoidance of any raw and unwashed food products, and general avoidance of food items that have not been prepared by themselves. The patient has been asked to call us immediately with any symptom developments, issues, questions or other general concerns.       Telemedicine Statement:    The patient acknowledged and agreed to the audio/video encounter and the patient who is being provided medical services by telemedicine is:  (1) informed of the relationship between the physician and patient and the respective role of any other health care provider with respect to management of the patient; and (2) notified that he or she may decline to receive medical services by telemedicine and may withdraw from such care at any time.      I have explained all of the above in detail and the patient understands all of the current recommendation(s). I have answered all of their questions to the best of my ability and to their complete satisfaction.   The patient is to continue with the current management plan.            Electronically signed by Issac Alvarez MD        Answers for HPI/ROS submitted by the patient on 5/10/2020   appetite change : No  unexpected weight change: No  visual disturbance: No  cough: No  shortness of breath:  No  chest pain: No  abdominal pain: No  diarrhea: No  frequency: No  back pain: No  rash: No  headaches: No  adenopathy: No  nervous/ anxious: No

## 2020-05-12 ENCOUNTER — OFFICE VISIT (OUTPATIENT)
Dept: HEMATOLOGY/ONCOLOGY | Facility: CLINIC | Age: 85
End: 2020-05-12
Payer: MEDICARE

## 2020-05-12 DIAGNOSIS — D64.9 ANEMIA, UNSPECIFIED TYPE: ICD-10-CM

## 2020-05-12 DIAGNOSIS — D63.8 ANEMIA OF CHRONIC DISEASE: ICD-10-CM

## 2020-05-12 DIAGNOSIS — D63.1 ANEMIA, CHRONIC RENAL FAILURE, STAGE 3 (MODERATE): ICD-10-CM

## 2020-05-12 DIAGNOSIS — R79.83 HOMOCYSTEINEMIA: ICD-10-CM

## 2020-05-12 DIAGNOSIS — D53.9 MACROCYTIC ANEMIA: ICD-10-CM

## 2020-05-12 DIAGNOSIS — I82.4Y2 DEEP VEIN THROMBOSIS (DVT) OF PROXIMAL VEIN OF LEFT LOWER EXTREMITY, UNSPECIFIED CHRONICITY: ICD-10-CM

## 2020-05-12 DIAGNOSIS — N18.30 ANEMIA, CHRONIC RENAL FAILURE, STAGE 3 (MODERATE): ICD-10-CM

## 2020-05-12 DIAGNOSIS — D64.9 NORMOCYTIC NORMOCHROMIC ANEMIA: ICD-10-CM

## 2020-05-12 DIAGNOSIS — F17.200 SMOKER: ICD-10-CM

## 2020-05-12 DIAGNOSIS — Z15.89 HETEROZYGOUS MTHFR MUTATION C677T: ICD-10-CM

## 2020-05-12 DIAGNOSIS — D50.0 IRON DEFICIENCY ANEMIA DUE TO CHRONIC BLOOD LOSS: ICD-10-CM

## 2020-05-12 DIAGNOSIS — D64.89 ANEMIA DUE TO MULTIPLE MECHANISMS: ICD-10-CM

## 2020-05-12 DIAGNOSIS — Z86.711 HISTORY OF PULMONARY EMBOLISM: Primary | ICD-10-CM

## 2020-05-12 DIAGNOSIS — Z79.01 CHRONIC ANTICOAGULATION: ICD-10-CM

## 2020-05-12 PROCEDURE — 1159F PR MEDICATION LIST DOCUMENTED IN MEDICAL RECORD: ICD-10-PCS | Mod: 95,,, | Performed by: INTERNAL MEDICINE

## 2020-05-12 PROCEDURE — 99213 PR OFFICE/OUTPT VISIT, EST, LEVL III, 20-29 MIN: ICD-10-PCS | Mod: 95,,, | Performed by: INTERNAL MEDICINE

## 2020-05-12 PROCEDURE — 1101F PT FALLS ASSESS-DOCD LE1/YR: CPT | Mod: 95,,, | Performed by: INTERNAL MEDICINE

## 2020-05-12 PROCEDURE — 1159F MED LIST DOCD IN RCRD: CPT | Mod: 95,,, | Performed by: INTERNAL MEDICINE

## 2020-05-12 PROCEDURE — 99213 OFFICE O/P EST LOW 20 MIN: CPT | Mod: 95,,, | Performed by: INTERNAL MEDICINE

## 2020-05-12 PROCEDURE — 1101F PR PT FALLS ASSESS DOC 0-1 FALLS W/OUT INJ PAST YR: ICD-10-PCS | Mod: 95,,, | Performed by: INTERNAL MEDICINE

## 2020-05-13 ENCOUNTER — OFFICE VISIT (OUTPATIENT)
Dept: FAMILY MEDICINE | Facility: CLINIC | Age: 85
End: 2020-05-13
Payer: MEDICARE

## 2020-05-13 DIAGNOSIS — R42 VERTIGO: ICD-10-CM

## 2020-05-13 DIAGNOSIS — M79.672 LEFT FOOT PAIN: Primary | ICD-10-CM

## 2020-05-13 PROCEDURE — 99213 PR OFFICE/OUTPT VISIT, EST, LEVL III, 20-29 MIN: ICD-10-PCS | Mod: 95,,, | Performed by: FAMILY MEDICINE

## 2020-05-13 PROCEDURE — 99213 OFFICE O/P EST LOW 20 MIN: CPT | Mod: 95,,, | Performed by: FAMILY MEDICINE

## 2020-05-13 NOTE — PROGRESS NOTES
Subjective:       Patient ID: Blaire Cage is a 89 y.o. female.    Chief Complaint: No chief complaint on file.    HPI  Review of Systems   Constitutional: Negative for fatigue and unexpected weight change.   Respiratory: Negative for chest tightness and shortness of breath.    Cardiovascular: Positive for leg swelling. Negative for chest pain and palpitations.   Gastrointestinal: Negative for abdominal pain.   Musculoskeletal: Positive for arthralgias.   Neurological: Negative for dizziness, syncope, light-headedness and headaches.       Patient Active Problem List   Diagnosis    Diabetic neuropathy, type II diabetes mellitus    CKD (chronic kidney disease), stage III, eGFR 39, progressive 31    Hypothyroidism    Hypertension associated with diabetes    Hyperlipidemia associated with type 2 diabetes mellitus    Type 2 diabetes mellitus with stage 4 chronic kidney disease    Right carotid bruit    COPD mixed type    Anxiety    Abdominal aortic aneurysm without rupture    Hip arthritis    Degenerative spinal arthritis    Osteoporosis    Left ventricular diastolic dysfunction with preserved systolic function    Hypertensive left ventricular hypertrophy, without heart failure    Smoker, quit 5/2016, 25 pck-years    Abdominal obesity    Absolute anemia    Body mass index (BMI) 23.0-23.9, adult, today 24.1    Iron deficiency anemia due to chronic blood loss    Proteinuria due to type 2 diabetes mellitus    History of syncope in childhood    Prerenal azotemia    Drug-induced constipation    COPD with acute exacerbation    Asthmatic bronchitis with acute exacerbation    Controlled type 2 diabetes mellitus, without long-term current use of insulin    Acute pulmonary embolism    Asthma-COPD overlap syndrome    Eosinophilic asthma    Moderate malnutrition    Type 2 diabetes mellitus with kidney complication, without long-term current use of insulin    Chronic anticoagulation     Constipation    DVT (deep venous thrombosis)    History of pulmonary embolism    Anemia due to multiple mechanisms    Anemia, chronic disease    Normocytic normochromic anemia    Anemia, chronic renal failure, stage 3 (moderate)    Homocysteinemia    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    Hyperkalemia    Diastolic CHF, chronic    Anemia of chronic disease    Kidney stones    Bradycardia    Macrocytic anemia    Orthostatic hypotension    Closed left hip fracture    Hip pain, left    On home oxygen therapy    Nonrheumatic aortic valve stenosis    Mitral stenosis    Hypoalbuminemia    Hypoglycemia    CKD (chronic kidney disease), stage IV    Dizziness     Patient is here for telemedicine visit  The patient location is: home in [place], [place]  The chief complaint leading to consultation is: foot pain  Visit type: Virtual visit with synchronous audio and video  Total time spent with patient: 10-20 min  Each patient to whom he or she provides medical services by telemedicine is:  (1) informed of the relationship between the physician and patient and the respective role of any other health care provider with respect to management of the patient; and (2) notified that he or she may decline to receive medical services by telemedicine and may withdraw from such care at any time.    Notes: see below   AUDIO VISIT ONLY? NO      Admitted NS 5/7/20 for dizziness x 1 month. Neuro consulted and no neuro or central cause of dizziness found. Recommended f/u ENT.  Radiology:   MRI brain 05/08/2020:  1. No evidence of an acute intracranial abnormality.  2. Moderate generalized cerebral volume loss and chronic ischemic changes, as above.  MRA brain 05/08/2020: No high-grade stenosis, large vessel occlusion, or aneurysm.  Carotid ultrasound 05/08/2020: No evidence of a hemodynamically significant carotid bifurcation stenosis.  There is moderate calcified atherosclerotic plaque seen in both carotid bifurcations.    Card  Dr. Herbert AAA  Heme/Onc Dr. Alvarez iron def anemia, h/o DVT and PE with MTHFR-C heterozygous + on eliquis  Pulm Dr. Christopher- asthma -COPD  GI Dr. Flores-   Nephro Dr. Jo -CKD stage 3 b, anemia of chronic diz, hyperuricemia    Main complaint today is pain and swelling and bruising in left foot on dorsal side.  Started before she was admitted with severe pain on standing. no injury or trauma known. Had x-rays in hospital- no fractures, chronic deg changes.  Pod Dr. Liang has seen her in past.  Gave her boot and wasn't compliant.      Dr. Rutherford ent next Monday for reeval dizziness  Objective:      Physical Exam   Constitutional: She appears well-developed and well-nourished.   Pulmonary/Chest: Effort normal.   Neurological: She is alert.   Skin: Bruising and ecchymosis noted. No laceration noted. There is erythema.        Psychiatric: She has a normal mood and affect. Her behavior is normal. Thought content normal.       Assessment:       1. Left foot pain    2. Vertigo        Plan:         1. Left foot pain  Refer for eval and treat  - Ambulatory referral/consult to Podiatry; Future    2. Vertigo  Keep appt ENT as planned.  Fall precautions and meds as needed        Time spent with patient: 20 minutes    Patient with be reevaluated in 3 months or sooner prn    Greater than 50% of this visit was spent counseling as described in above documentation:Yes

## 2020-05-13 NOTE — Clinical Note
Needs f/u OV with me 3 months and appt with Dr. Park urgently for left foot pain, swelling and bruising

## 2020-05-14 ENCOUNTER — PATIENT OUTREACH (OUTPATIENT)
Dept: ADMINISTRATIVE | Facility: OTHER | Age: 85
End: 2020-05-14

## 2020-05-14 NOTE — PROGRESS NOTES
Called and spoke to patient regarding follow up and referral to Dr. Park for foot pain. 3 month follow up scheduled, referral fax to Dr. Park to scheduled patient.

## 2020-05-14 NOTE — PROGRESS NOTES
Chart reviewed.   Immunizations: Triggered Imm Registry     Orders placed: n/a  Upcoming appts to satisfy GERRI topics: n/a

## 2020-05-15 ENCOUNTER — HOSPITAL ENCOUNTER (EMERGENCY)
Facility: HOSPITAL | Age: 85
Discharge: HOME | End: 2020-05-16
Attending: EMERGENCY MEDICINE
Payer: MEDICARE

## 2020-05-15 DIAGNOSIS — R55 SYNCOPE: ICD-10-CM

## 2020-05-15 DIAGNOSIS — R42 VERTIGO: Primary | ICD-10-CM

## 2020-05-15 LAB
ALBUMIN SERPL BCP-MCNC: 3.5 G/DL (ref 3.5–5.2)
ALP SERPL-CCNC: 76 U/L (ref 55–135)
ALT SERPL W/O P-5'-P-CCNC: 20 U/L (ref 10–44)
ANION GAP SERPL CALC-SCNC: 11 MMOL/L (ref 8–16)
AST SERPL-CCNC: 30 U/L (ref 10–40)
BACTERIA #/AREA URNS HPF: ABNORMAL /HPF
BASOPHILS # BLD AUTO: 0.03 K/UL (ref 0–0.2)
BASOPHILS NFR BLD: 0.3 % (ref 0–1.9)
BILIRUB SERPL-MCNC: 0.3 MG/DL (ref 0.1–1)
BILIRUB UR QL STRIP: NEGATIVE
BUN SERPL-MCNC: 52 MG/DL (ref 8–23)
CALCIUM SERPL-MCNC: 9.3 MG/DL (ref 8.7–10.5)
CHLORIDE SERPL-SCNC: 98 MMOL/L (ref 95–110)
CLARITY UR: CLEAR
CO2 SERPL-SCNC: 24 MMOL/L (ref 23–29)
COLOR UR: YELLOW
CREAT SERPL-MCNC: 1.9 MG/DL (ref 0.5–1.4)
DIFFERENTIAL METHOD: ABNORMAL
EOSINOPHIL # BLD AUTO: 0.2 K/UL (ref 0–0.5)
EOSINOPHIL NFR BLD: 2.3 % (ref 0–8)
ERYTHROCYTE [DISTWIDTH] IN BLOOD BY AUTOMATED COUNT: 13.3 % (ref 11.5–14.5)
EST. GFR  (AFRICAN AMERICAN): 27 ML/MIN/1.73 M^2
EST. GFR  (NON AFRICAN AMERICAN): 23 ML/MIN/1.73 M^2
GLUCOSE SERPL-MCNC: 83 MG/DL (ref 70–110)
GLUCOSE UR QL STRIP: NEGATIVE
HCT VFR BLD AUTO: 30.3 % (ref 37–48.5)
HGB BLD-MCNC: 9.7 G/DL (ref 12–16)
HGB UR QL STRIP: ABNORMAL
HYALINE CASTS #/AREA URNS LPF: 0 /LPF
IMM GRANULOCYTES # BLD AUTO: 0.04 K/UL (ref 0–0.04)
IMM GRANULOCYTES NFR BLD AUTO: 0.4 % (ref 0–0.5)
KETONES UR QL STRIP: NEGATIVE
LEUKOCYTE ESTERASE UR QL STRIP: ABNORMAL
LYMPHOCYTES # BLD AUTO: 2 K/UL (ref 1–4.8)
LYMPHOCYTES NFR BLD: 22.6 % (ref 18–48)
MCH RBC QN AUTO: 30.6 PG (ref 27–31)
MCHC RBC AUTO-ENTMCNC: 32 G/DL (ref 32–36)
MCV RBC AUTO: 96 FL (ref 82–98)
MICROSCOPIC COMMENT: ABNORMAL
MONOCYTES # BLD AUTO: 0.9 K/UL (ref 0.3–1)
MONOCYTES NFR BLD: 9.6 % (ref 4–15)
NEUTROPHILS # BLD AUTO: 5.8 K/UL (ref 1.8–7.7)
NEUTROPHILS NFR BLD: 64.8 % (ref 38–73)
NITRITE UR QL STRIP: NEGATIVE
NRBC BLD-RTO: 0 /100 WBC
PH UR STRIP: 6 [PH] (ref 5–8)
PLATELET # BLD AUTO: 203 K/UL (ref 150–350)
PMV BLD AUTO: 10.8 FL (ref 9.2–12.9)
POTASSIUM SERPL-SCNC: 5.1 MMOL/L (ref 3.5–5.1)
PROT SERPL-MCNC: 7.1 G/DL (ref 6–8.4)
PROT UR QL STRIP: ABNORMAL
RBC # BLD AUTO: 3.17 M/UL (ref 4–5.4)
RBC #/AREA URNS HPF: 2 /HPF (ref 0–4)
SODIUM SERPL-SCNC: 133 MMOL/L (ref 136–145)
SP GR UR STRIP: 1.01 (ref 1–1.03)
SQUAMOUS #/AREA URNS HPF: 12 /HPF
URN SPEC COLLECT METH UR: ABNORMAL
UROBILINOGEN UR STRIP-ACNC: NEGATIVE EU/DL
WBC # BLD AUTO: 8.98 K/UL (ref 3.9–12.7)
WBC #/AREA URNS HPF: 34 /HPF (ref 0–5)

## 2020-05-15 PROCEDURE — 85025 COMPLETE CBC W/AUTO DIFF WBC: CPT

## 2020-05-15 PROCEDURE — 81000 URINALYSIS NONAUTO W/SCOPE: CPT

## 2020-05-15 PROCEDURE — 93005 ELECTROCARDIOGRAM TRACING: CPT

## 2020-05-15 PROCEDURE — 80053 COMPREHEN METABOLIC PANEL: CPT

## 2020-05-15 PROCEDURE — 36415 COLL VENOUS BLD VENIPUNCTURE: CPT

## 2020-05-15 PROCEDURE — 99284 EMERGENCY DEPT VISIT MOD MDM: CPT | Mod: 25

## 2020-05-15 PROCEDURE — 87086 URINE CULTURE/COLONY COUNT: CPT

## 2020-05-15 RX ORDER — CLONIDINE HYDROCHLORIDE 0.1 MG/1
0.1 TABLET ORAL
Status: COMPLETED | OUTPATIENT
Start: 2020-05-16 | End: 2020-05-16

## 2020-05-15 RX ORDER — MECLIZINE HYDROCHLORIDE 25 MG/1
TABLET ORAL
Status: DISCONTINUED
Start: 2020-05-15 | End: 2020-05-16 | Stop reason: HOSPADM

## 2020-05-15 RX ORDER — MECLIZINE HCL 12.5 MG 12.5 MG/1
12.5 TABLET ORAL
Status: COMPLETED | OUTPATIENT
Start: 2020-05-16 | End: 2020-05-16

## 2020-05-15 RX ORDER — AMLODIPINE BESYLATE 5 MG/1
5 TABLET ORAL
Status: COMPLETED | OUTPATIENT
Start: 2020-05-16 | End: 2020-05-16

## 2020-05-16 VITALS
HEIGHT: 64 IN | WEIGHT: 121 LBS | DIASTOLIC BLOOD PRESSURE: 65 MMHG | BODY MASS INDEX: 20.66 KG/M2 | TEMPERATURE: 98 F | SYSTOLIC BLOOD PRESSURE: 149 MMHG | OXYGEN SATURATION: 96 % | RESPIRATION RATE: 16 BRPM | HEART RATE: 59 BPM

## 2020-05-16 PROCEDURE — 25000003 PHARM REV CODE 250: Performed by: EMERGENCY MEDICINE

## 2020-05-16 RX ORDER — MECLIZINE HYDROCHLORIDE 25 MG/1
25 TABLET ORAL 3 TIMES DAILY PRN
Qty: 20 TABLET | Refills: 0 | Status: SHIPPED | OUTPATIENT
Start: 2020-05-16 | End: 2020-11-09 | Stop reason: SDUPTHER

## 2020-05-16 RX ADMIN — AMLODIPINE BESYLATE 5 MG: 5 TABLET ORAL at 12:05

## 2020-05-16 RX ADMIN — CLONIDINE HYDROCHLORIDE 0.1 MG: 0.1 TABLET ORAL at 12:05

## 2020-05-16 RX ADMIN — MECLIZINE HYDROCHLORIDE 12.5 MG: 25 TABLET ORAL at 12:05

## 2020-05-16 NOTE — ED PROVIDER NOTES
Encounter Date: 5/15/2020    SCRIBE #1 NOTE: IJojo, am scribing for, and in the presence of, Dr. Jara.       History     Chief Complaint   Patient presents with    Loss of Consciousness     extreme weakness       Time seen by provider: 9:43 PM on 05/15/2020    Blaire Cage is a 89 y.o. female with COPD, HTN, DM and thyroid disease who presents to the ED for the evaluation of a near syncopal episode that happened earlier today. She states she started feeling very dizzy and lightheaded earlier tonight. She has had more frequent episodes like this and states they only last for a few minutes and then resolve. She is scheduled to see an ENT this week. No LOC or fall. No cough, congestion, fever, CP or SOB. Patient had a recent PE, and is on Eliquis. She had a hemoglobin of 8.7 on iron and multivitamin supplements, with adequate iron panel. Patient ambulates with a walker. No cardiovascular PSHx. Family hx of vertigo.     The history is provided by the patient.     Review of patient's allergies indicates:   Allergen Reactions    Carvedilol Other (See Comments)     Bradycardia and syncope    Boniva [ibandronate]     Codeine     Hydralazine analogues     Iodinated contrast media Hives    Kionex [sodium polystyrene sulfonate] Diarrhea     From encounter 12/11/17 for diarrhea--not true allergy.   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 8/7/2018    Hip arthritis 3/1/2016     Homocysteinemia 2018    Hyperlipidemia     Hypertension     Normocytic normochromic anemia 2018    PE (pulmonary thromboembolism) 2018    Pneumonia of right lower lobe due to infectious organism 2017    Thyroid disease     hypothyroid    Type 2 diabetes mellitus with stage 3 chronic kidney disease 2013     Past Surgical History:   Procedure Laterality Date    APPENDECTOMY      CHOLECYSTECTOMY      FRACTURE SURGERY      right hip     HERNIA REPAIR      groin    HYSTERECTOMY      INSERTION OF IMPLANTABLE LOOP RECORDER N/A 2018    Procedure: Insertion, Implantable Loop Recorder;  Surgeon: Ashok Herbert MD;  Location: [facility] CATH LAB;  Service: Cardiology;  Laterality: N/A;    PERCUTANEOUS PINNING OF HIP Left 3/23/2019    Procedure: PINNING, HIP, PERCUTANEOUS;  Surgeon: Jorje Hamlin MD;  Location: [facility] OR;  Service: Orthopedics;  Laterality: Left;    VARICOSE VEIN SURGERY       Family History   Problem Relation Age of Onset    Hypertension Mother     Heart disease Mother     Lung disease Father     Diabetes Sister     Diabetes Brother     Kidney disease Son      Social History     Tobacco Use    Smoking status: Former Smoker     Packs/day: 0.35     Years: 44.00     Pack years: 15.40     Types: Cigarettes     Last attempt to quit: 2015     Years since quittin.0    Smokeless tobacco: Never Used    Tobacco comment: 2015   Substance Use Topics    Alcohol use: No     Alcohol/week: 0.0 standard drinks     Frequency: Never     Binge frequency: Never    Drug use: No     Review of Systems   Constitutional: Negative for fever.   HENT: Negative for congestion.    Eyes: Negative for visual disturbance.   Respiratory: Negative for cough, shortness of breath and wheezing.    Cardiovascular: Negative for chest pain.   Gastrointestinal: Negative for abdominal pain.   Genitourinary: Negative for dysuria.   Musculoskeletal: Positive for gait problem. Negative  for joint swelling.   Skin: Negative for rash.   Neurological: Positive for dizziness and light-headedness. Negative for syncope.   Hematological: Does not bruise/bleed easily.   Psychiatric/Behavioral: Negative for confusion.       Physical Exam     Initial Vitals   BP Pulse Resp Temp SpO2   05/15/20 2106 05/15/20 2106 05/15/20 2106 05/15/20 2106 05/15/20 2132   (!) 190/77 67 16 97.9 °F (36.6 °C) 95 %      MAP       --                Physical Exam    Nursing note and vitals reviewed.  Constitutional: She appears well-developed and well-nourished.  Non-toxic appearance. No distress.   HENT:   Head: Normocephalic and atraumatic.   Eyes: EOM are normal. Pupils are equal, round, and reactive to light.   Neck: Normal range of motion. Neck supple. No neck rigidity. No JVD present.   Cardiovascular: Normal rate, regular rhythm, normal heart sounds and intact distal pulses. Exam reveals no gallop and no friction rub.    No murmur heard.  Faint right sided carotid bruit.    Pulmonary/Chest: Breath sounds normal. She has no wheezes. She has no rhonchi. She has no rales.   Abdominal: Soft. Bowel sounds are normal. She exhibits no distension. There is no tenderness. There is no rebound and no guarding.   Musculoskeletal: Normal range of motion.   Neurological: She is alert and oriented to person, place, and time. She has normal strength and normal reflexes. No cranial nerve deficit or sensory deficit. She exhibits normal muscle tone. Coordination normal. GCS eye subscore is 4. GCS verbal subscore is 5. GCS motor subscore is 6.   Skin: Skin is warm and dry.   Psychiatric: She has a normal mood and affect. Her speech is normal and behavior is normal. She is not actively hallucinating.         ED Course   Procedures  Labs Reviewed   CBC W/ AUTO DIFFERENTIAL - Abnormal; Notable for the following components:       Result Value    RBC 3.17 (*)     Hemoglobin 9.7 (*)     Hematocrit 30.3 (*)     All other components within normal  limits   COMPREHENSIVE METABOLIC PANEL - Abnormal; Notable for the following components:    Sodium 133 (*)     BUN, Bld 52 (*)     Creatinine 1.9 (*)     eGFR if  27 (*)     eGFR if non  23 (*)     All other components within normal limits   URINALYSIS, REFLEX TO URINE CULTURE - Abnormal; Notable for the following components:    Protein, UA 1+ (*)     Occult Blood UA Trace (*)     Leukocytes, UA Trace (*)     All other components within normal limits    Narrative:     Preferred Collection Type->Urine, Clean Catch   URINALYSIS MICROSCOPIC - Abnormal; Notable for the following components:    WBC, UA 34 (*)     Bacteria Few (*)     All other components within normal limits    Narrative:     Preferred Collection Type->Urine, Clean Catch   CULTURE, URINE     EKG Readings: (Independently Interpreted)   Initial Reading: No STEMI.   Normal sinus rhythm at rate of 63 bpm with normal axis and normal intervals. No acute ST segment changes.          Imaging Results    None          Medical Decision Making:   History:   Old Medical Records: I decided to obtain old medical records.  Old Records Summarized: records from previous admission(s).       <> Summary of Records: Carotid ultrasound 05/08/2020: No evidence of a hemodynamically significant carotid bifurcation stenosis.  There is moderate calcified atherosclerotic plaque seen in both carotid bifurcations.    Initial Assessment:   89-year-old woman presents emergency department for evaluation of vertigo.  Further history was obtained from family members.  Vertigo episode seems to be positional and has resolved.  She is currently not taking medication was recently evaluated with negative MRI and carotid ultrasound with symptoms of vertigo.  On examination she has no focal neurological deficits.  She is no longer dizzy.  Laboratory evaluation reveals chronic anemia, CKD which is chronic.  Urinalysis is nitrite negative with 12 squamous epithelial,  few bacteria and 34 white blood cells.  Patient denies any dysuria or urinary frequency.  Will obtain urine culture and treat pending culture results.  This was discussed with the patient.  Return precautions discussed.  She will be prescribed meclizine and has follow-up with ENT established.  Return precautions discussed.  She is discharged improved in no acute distress.  Independently Interpreted Test(s):   I have ordered and independently interpreted EKG Reading(s) - see prior notes  Clinical Tests:   Lab Tests: Ordered and Reviewed  Medical Tests: Ordered and Reviewed            Scribe Attestation:   Scribe #1: I performed the above scribed service and the documentation accurately describes the services I performed. I attest to the accuracy of the note.                   I, Marek Turner, personally performed the services described in this documentation. All medical record entries made by the scribe were at my direction and in my presence.  I have reviewed the chart and agree that the record reflects my personal performance and is accurate and complete. Ruben Jara MD.        Clinical Impression:       ICD-10-CM ICD-9-CM   1. Vertigo R42 780.4   2. Syncope R55 780.2         Disposition:   Disposition: Discharged  Condition: Stable     ED Disposition Condition    Discharge Stable        ED Prescriptions     Medication Sig Dispense Start Date End Date Auth. Provider    meclizine (ANTIVERT) 25 mg tablet Take 1 tablet (25 mg total) by mouth 3 (three) times daily as needed. 20 tablet 5/16/2020  Ruben Jara MD        Follow-up Information     Follow up With Specialties Details Why Contact Info    Eli Edmonds MD Family Medicine Schedule an appointment as soon as possible for a visit   [address] [facility] [address] [phone]      [facility] Emergency Medicine  As needed, If symptoms worsen [address] [place] [id number] [phone]                                      Ruben Jara MD  05/16/20 0323

## 2020-05-17 LAB — BACTERIA UR CULT: NORMAL

## 2020-05-19 ENCOUNTER — OFFICE VISIT (OUTPATIENT)
Dept: PODIATRY | Facility: CLINIC | Age: 85
End: 2020-05-19
Payer: MEDICARE

## 2020-05-19 VITALS
SYSTOLIC BLOOD PRESSURE: 139 MMHG | HEIGHT: 64 IN | WEIGHT: 130.5 LBS | HEART RATE: 65 BPM | DIASTOLIC BLOOD PRESSURE: 60 MMHG | TEMPERATURE: 97 F | BODY MASS INDEX: 22.28 KG/M2

## 2020-05-19 DIAGNOSIS — M79.672 LEFT FOOT PAIN: ICD-10-CM

## 2020-05-19 DIAGNOSIS — M19.90 ARTHRITIS: Primary | ICD-10-CM

## 2020-05-19 DIAGNOSIS — M76.822 POSTERIOR TIBIAL TENDON DYSFUNCTION, LEFT: ICD-10-CM

## 2020-05-19 PROCEDURE — 1159F MED LIST DOCD IN RCRD: CPT | Mod: S$GLB,,, | Performed by: PODIATRIST

## 2020-05-19 PROCEDURE — 1101F PR PT FALLS ASSESS DOC 0-1 FALLS W/OUT INJ PAST YR: ICD-10-PCS | Mod: S$GLB,,, | Performed by: PODIATRIST

## 2020-05-19 PROCEDURE — 1159F PR MEDICATION LIST DOCUMENTED IN MEDICAL RECORD: ICD-10-PCS | Mod: S$GLB,,, | Performed by: PODIATRIST

## 2020-05-19 PROCEDURE — 99203 PR OFFICE/OUTPT VISIT, NEW, LEVL III, 30-44 MIN: ICD-10-PCS | Mod: S$GLB,,, | Performed by: PODIATRIST

## 2020-05-19 PROCEDURE — 1101F PT FALLS ASSESS-DOCD LE1/YR: CPT | Mod: S$GLB,,, | Performed by: PODIATRIST

## 2020-05-19 PROCEDURE — 1125F PR PAIN SEVERITY QUANTIFIED, PAIN PRESENT: ICD-10-PCS | Mod: S$GLB,,, | Performed by: PODIATRIST

## 2020-05-19 PROCEDURE — 99203 OFFICE O/P NEW LOW 30 MIN: CPT | Mod: S$GLB,,, | Performed by: PODIATRIST

## 2020-05-19 PROCEDURE — 1125F AMNT PAIN NOTED PAIN PRSNT: CPT | Mod: S$GLB,,, | Performed by: PODIATRIST

## 2020-05-19 RX ORDER — GLIPIZIDE 2.5 MG/1
2.5 TABLET, EXTENDED RELEASE ORAL DAILY
COMMUNITY
Start: 2020-05-11 | End: 2020-09-30

## 2020-05-19 NOTE — LETTER
May 19, 2020      Eli Edmonds MD  [address]  [place] [address]           [facility] - Podiatry  [address]  [place] [address]  Phone: [phone]  Fax: [phone]          Patient: Blaire Cage   MR Number: [id number]   YOB: 1930   Date of Visit: 5/19/2020       Dear Dr. Eli Edmonds:    Thank you for referring Blaire Cage to me for evaluation. Attached you will find relevant portions of my assessment and plan of care.    If you have questions, please do not hesitate to call me. I look forward to following Blaire Cage along with you.    Sincerely,    [place], LUIS    Enclosure  CC:  No Recipients    If you would like to receive this communication electronically, please contact [email or web address] or [phone] to request more information on [facility] access.    For providers and/or their staff who would like to refer a patient to [facility], please contact us through our one-stop-shop provider referral line, [facility], at [phone].    If you feel you have received this communication in error or would no longer like to receive these types of communications, please e-mail [email or web address]

## 2020-05-19 NOTE — PROGRESS NOTES
[address] [facility] [address]  [place] [address]  Phone: [phone]   [phone]    Patient's PCP:Eli Edmonds MD  Referring Provider: Dr. Eli Edmonds    Subjective:      Chief Complaint:: Foot Pain (entire left foot)    BUBBA Cage is a 89 y.o. female who presents with a complaint of left foot pain lasting for one month. Onset of the symptoms was was pain and swelling.  Current symptoms include throbbing pain and swelling.  Aggravating factors are walking and weightbearing. Symptoms have remained.Treatment to date have included elevation and tylenol Patients rates pain 9-10/10 on pain scale.    Systemic Doctor: Eli Edmonds MD  Date Last Seen: 05/15/2020  Blood Sugar: 117  Hemoglobin A1c: 5.7    Vitals:    05/19/20 0908   BP: 139/60   Pulse: 65   Temp: 96.6 °F (35.9 °C)     Shoe Size:     Past Surgical History:   Procedure Laterality Date    APPENDECTOMY      CHOLECYSTECTOMY      FRACTURE SURGERY      right hip     HERNIA REPAIR      groin    HYSTERECTOMY      INSERTION OF IMPLANTABLE LOOP RECORDER N/A 12/12/2018    Procedure: Insertion, Implantable Loop Recorder;  Surgeon: Ashok Herbert MD;  Location: [facility] CATH LAB;  Service: Cardiology;  Laterality: N/A;    PERCUTANEOUS PINNING OF HIP Left 3/23/2019    Procedure: PINNING, HIP, PERCUTANEOUS;  Surgeon: Jorje Hamlin MD;  Location: [facility] OR;  Service: Orthopedics;  Laterality: Left;    VARICOSE VEIN SURGERY       Past Medical History:   Diagnosis Date    Anemia due to multiple mechanisms 6/29/2018    Anemia, chronic disease 6/29/2018    Anemia, chronic renal failure, stage 3 (moderate) 6/29/2018    Anticoagulant long-term use     aspirin    Aortic aneurysm     CHF (congestive heart failure)     COPD (chronic obstructive pulmonary disease)     COPD with acute exacerbation 1/9/2015    Diabetes mellitus type II     DVT (deep venous thrombosis) 06/09/2018    Encounter for blood transfusion     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    Hip arthritis 3/1/2016    Homocysteinemia 8/7/2018    Hyperlipidemia     Hypertension     Normocytic normochromic anemia 6/29/2018    PE (pulmonary thromboembolism) 06/09/2018    Pneumonia of right lower lobe due to infectious organism 9/11/2017    Thyroid disease     hypothyroid    Type 2 diabetes mellitus with stage 3 chronic kidney disease 1/18/2013     Family History   Problem Relation Age of Onset    Hypertension Mother     Heart disease Mother     Lung disease Father     Diabetes Sister     Diabetes Brother     Kidney disease Son         Social History:   Marital Status: Legally   Alcohol History:  reports that she does not drink alcohol.  Tobacco History:  reports that she quit smoking about 5 years ago. Her smoking use included cigarettes. She has a 15.40 pack-year smoking history. She has never used smokeless tobacco.  Drug History:  reports that she does not use drugs.    Review of patient's allergies indicates:   Allergen Reactions    Carvedilol Other (See Comments)     Bradycardia and syncope    Boniva [ibandronate]     Codeine     Hydralazine analogues     Iodinated contrast media Hives    Kionex [sodium polystyrene sulfonate] Diarrhea     From encounter 12/11/17 for diarrhea--not true allergy.    Lisinopril     Morphine        Current Outpatient Medications   Medication Sig Dispense Refill    acetaminophen (TYLENOL) 325 MG tablet Take 2 tablets (650 mg total) by mouth every 4 (four) hours as needed. (Patient taking differently: Take 650 mg by mouth every 4 (four) hours as needed for Pain. )  0    albuterol-ipratropium (DUO-NEB) 2.5 mg-0.5 mg/3 mL nebulizer solution Take 3 mLs by nebulization every 6 (six) hours as needed for Wheezing. Rescue 120 vial 11    amLODIPine (NORVASC) 5 MG tablet Take 1 tablet (5 mg total) by mouth 2 (two) times daily. 90 tablet 2    apixaban (ELIQUIS) 5 mg Tab Take 1 tablet (5 mg total) by mouth 2 (two) times  daily. 180 tablet 3    ascorbic acid (VITAMIN C) 500 MG tablet Take 500 mg by mouth once daily.        aspirin (ECOTRIN) 81 MG EC tablet Take 81 mg by mouth once daily.        calcium carbonate (OS-TASIA) 500 mg calcium (1,250 mg) tablet Take 1 tablet by mouth 2 (two) times daily.        cyanocobalamin/folic acid (FOLTRATE ORAL) vitamin B12-folic acid   2.5 - 25-2MG      docusate sodium (COLACE) 100 MG capsule Take 1 capsule (100 mg total) by mouth 2 (two) times daily. 90 capsule 0    ferrous sulfate (FEOSOL) 325 mg (65 mg iron) Tab tablet Take 1 tablet (325 mg total) by mouth 2 (two) times daily with meals. 180 tablet 3    fish oil-omega-3 fatty acids 300-1,000 mg capsule Take 2 g by mouth every evening.       fluocinolone (SYNALAR) 0.01 % external solution Apply topically 2 (two) times daily. 90 mL 1    fluticasone (FLONASE) 50 mcg/actuation nasal spray 2 sprays by Each Nare route once daily. 48 g 3    fluticasone-umeclidin-vilanter (TRELEGY ELLIPTA) 100-62.5-25 mcg DsDv Inhale 1 puff into the lungs once daily. 60 each 11    folic acid-vit B6-vit B12 2.5-25-2 mg (FOLBIC) 2.5-25-2 mg Tab Take 1 tablet by mouth once daily. 90 tablet 3    furosemide (LASIX) 20 MG tablet Take 1 tablet only as needed, for swelling, increase SOB, weight gain of 3 lbs overnight or 5 lbs for the week 24 tablet 3    gabapentin (NEURONTIN) 300 MG capsule Take 1 capsule (300 mg total) by mouth every evening. 90 capsule 3    glipiZIDE (GLUCOTROL) 2.5 MG TR24 Take 2.5 mg by mouth once daily.      levothyroxine (SYNTHROID) 88 MCG tablet Take 88 mcg by mouth once daily.      lidocaine (LIDODERM) 5 % Place 1 patch onto the skin once daily. Remove & Discard patch within 12 hours or as directed by MD 3 patch 0    magnesium oxide (MAG-OX) 400 mg (241.3 mg magnesium) tablet TAKE 1 TABLET BY MOUTH ONCE DAILY 90 tablet 3    meclizine (ANTIVERT) 25 mg tablet Take 1 tablet (25 mg total) by mouth 3 (three) times daily as needed. 20 tablet  0    montelukast (SINGULAIR) 10 mg tablet TAKE 1 TABLET BY MOUTH ONCE DAILY IN THE EVENING 90 tablet 1    multivitamin (THERAGRAN) tablet Take 1 tablet by mouth once daily.      mupirocin (BACTROBAN) 2 % ointment Apply topically 2 (two) times daily. 30 g 1    nitroGLYCERIN (NITROSTAT) 0.4 MG SL tablet Place 1 tablet (0.4 mg total) under the tongue every 5 (five) minutes as needed for Chest pain. As needed (Patient taking differently: Place 0.4 mg under the tongue every 5 (five) minutes as needed for Chest pain. Seek medical help if pain is not relieved after the third dose.) 30 tablet 3    sertraline (ZOLOFT) 25 MG tablet Take 1 tablet (25 mg total) by mouth once daily. 90 tablet 3    simvastatin (ZOCOR) 40 MG tablet TAKE 1 TABLET BY MOUTH ONCE DAILY IN THE EVENING 90 tablet 3    traMADoL (ULTRAM) 50 mg tablet Take 1 tablet (50 mg total) by mouth every 8 (eight) hours as needed for Pain. 9 tablet 0    vitamin D 1000 units Tab Take 1 tablet (1,000 Units total) by mouth once daily. 90 tablet 3     No current facility-administered medications for this visit.        Review of Systems   Constitutional: Positive for diaphoresis.   Musculoskeletal: Positive for joint swelling.   Neurological: Positive for weakness.         Objective:        Physical Exam:   Foot Exam    General  General Appearance: appears stated age and healthy   Orientation: alert and oriented to person, place, and time   Affect: appropriate   Gait: antalgic       Left Foot/Ankle      Inspection and Palpation  Ecchymosis: none  Tenderness: (Medial arch, Lisfranc joint, mid tarsal joint.)  Swelling: (Throughout the foot secondary to the severe arthritis)  Arch: pes planus (Rigid pes planus consistent with stage IV posterior tibial tendon dysfunction)  Skin Exam: skin intact;     Neurovascular  Dorsalis pedis: 1+  Posterior tibial: 1+  Saphenous nerve sensation: normal  Tibial nerve sensation: normal  Superficial peroneal nerve sensation:  normal  Deep peroneal nerve sensation: normal  Sural nerve sensation: normal    Muscle Strength  Ankle dorsiflexion: 4  Ankle plantar flexion: 4  Ankle inversion: 4  Ankle eversion: 4  Great toe extension: 4    Range of Motion    Passive  Ankle dorsiflexion: 5  Plantar flexion: 15  Ankle eversion: 0  Ankle inversion: 5    Active  Ankle dorsiflexion: 5  Plantar flexion: 15  Ankle eversion: 0  Ankle inversion: 5          Physical Exam   Cardiovascular:   Pulses:       Dorsalis pedis pulses are 1+ on the left side.        Posterior tibial pulses are 1+ on the left side.       Imaging:    AP, lateral, lateral oblique nonweightbearing x-rays done at hospital are other facility:  Severe degenerative joint disease throughout mid tarsal Lisfranc joint with malalignment of talonavicular joint and Lisfranc joint.  Previous healed old fractures distal metatarsals 3 and 4.  Osteopenia.  Calcific anterior tibial vessel.         Assessment:       1. Arthritis    2. Posterior tibial tendon dysfunction, left    3. Left foot pain      Plan:   Arthritis  -     CROSS  FOR HOME USE    Posterior tibial tendon dysfunction, left    Left foot pain  -     Ambulatory referral/consult to Podiatry  -     CROSS  FOR HOME USE     1.  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  I discussed with her that I have no way of curing this problem and make any completely asymptomatic and that because of her age and bone quality there is no surgery that would be performed.  I am recommending cross trainers by Ernesto is to help pad and support the arch and I am referring her to varsity sports with help for some slip-on supportive shoes to help offload the arch.  She will use topical anti-inflammatories that she purchases over-the-counter.  2.  She will return as needed.  No follow-ups on file.    Procedures - None    Counseling:   posterior  tibial tendon dysfuncton stage 1, 2, 3 ,4 and the long term treatment of each stage and the possible complications of each stage.  I explained the possible need of an MRI of the ankle if the pain continues.  I provided patient education verbally regarding:   Patient diagnosis, treatment options, as well as alternatives, risks, and benefits.     This note was created using Dragon voice recognition software that occasionally misinterpreted phrases or words.               Answers for HPI/ROS submitted by the patient on 5/18/2020   Chronicity: chronic  Onset: 1 to 4 weeks ago  Frequency: constantly  Progression since onset: unchanged  vertigo: Yes  Aggravating factors: standing, walking  Treatments tried: acetaminophen, heat, oral narcotics, position changes, rest, sleep  Improvement on treatment: mild

## 2020-05-20 ENCOUNTER — PATIENT MESSAGE (OUTPATIENT)
Dept: FAMILY MEDICINE | Facility: CLINIC | Age: 85
End: 2020-05-20

## 2020-05-22 ENCOUNTER — TELEPHONE (OUTPATIENT)
Dept: FAMILY MEDICINE | Facility: CLINIC | Age: 85
End: 2020-05-22

## 2020-05-22 DIAGNOSIS — M79.673 PAIN OF FOOT, UNSPECIFIED LATERALITY: Primary | ICD-10-CM

## 2020-05-22 NOTE — TELEPHONE ENCOUNTER
----- Message from Abdirahman Smiley sent at 5/22/2020 12:18 PM CDT -----  Contact: same  Patient called in and stated she saw a podiatrist on 5/19/2020 who informed her there was nothing he could do for her foot.  Patient stated she is in constant pain & would like to speak to nurse about it.    Patient call back number is [phone]

## 2020-05-24 RX ORDER — AMLODIPINE BESYLATE 5 MG/1
5 TABLET ORAL 2 TIMES DAILY
Qty: 180 TABLET | Refills: 3 | Status: SHIPPED | OUTPATIENT
Start: 2020-05-24 | End: 2021-10-01

## 2020-05-24 RX ORDER — DICLOFENAC SODIUM 10 MG/G
2 GEL TOPICAL DAILY
Qty: 100 G | Status: SHIPPED | OUTPATIENT
Start: 2020-05-24 | End: 2022-01-01

## 2020-05-24 NOTE — TELEPHONE ENCOUNTER
Recommend otc tylenol ES 500mg 2 every 8 hours no more than 6/day.  She can use voltaren gel which I called in topically. If not enough relief we will need VV or OV to discuss narcotics risks and benefits

## 2020-05-28 ENCOUNTER — CLINICAL SUPPORT (OUTPATIENT)
Dept: CARDIOLOGY | Facility: CLINIC | Age: 85
End: 2020-05-28
Payer: MEDICARE

## 2020-05-28 DIAGNOSIS — Z95.818 PRESENCE OF OTHER CARDIAC IMPLANTS AND GRAFTS: ICD-10-CM

## 2020-05-28 PROCEDURE — 93298 REM INTERROG DEV EVAL SCRMS: CPT | Mod: S$GLB,,, | Performed by: INTERNAL MEDICINE

## 2020-05-28 PROCEDURE — 93298 CARDIAC DEVICE CHECK - REMOTE: ICD-10-PCS | Mod: S$GLB,,, | Performed by: INTERNAL MEDICINE

## 2020-05-30 ENCOUNTER — OFFICE VISIT (OUTPATIENT)
Dept: FAMILY MEDICINE | Facility: CLINIC | Age: 85
End: 2020-05-30
Payer: MEDICARE

## 2020-05-30 DIAGNOSIS — R10.13 EPIGASTRIC PAIN: Primary | ICD-10-CM

## 2020-05-30 DIAGNOSIS — M79.673 PAIN OF FOOT, UNSPECIFIED LATERALITY: ICD-10-CM

## 2020-05-30 PROCEDURE — 99213 PR OFFICE/OUTPT VISIT, EST, LEVL III, 20-29 MIN: ICD-10-PCS | Mod: 95,,, | Performed by: FAMILY MEDICINE

## 2020-05-30 PROCEDURE — 1101F PT FALLS ASSESS-DOCD LE1/YR: CPT | Mod: CPTII,95,, | Performed by: FAMILY MEDICINE

## 2020-05-30 PROCEDURE — 99213 OFFICE O/P EST LOW 20 MIN: CPT | Mod: 95,,, | Performed by: FAMILY MEDICINE

## 2020-05-30 PROCEDURE — 1159F PR MEDICATION LIST DOCUMENTED IN MEDICAL RECORD: ICD-10-PCS | Mod: 95,,, | Performed by: FAMILY MEDICINE

## 2020-05-30 PROCEDURE — 1101F PR PT FALLS ASSESS DOC 0-1 FALLS W/OUT INJ PAST YR: ICD-10-PCS | Mod: CPTII,95,, | Performed by: FAMILY MEDICINE

## 2020-05-30 PROCEDURE — 99499 UNLISTED E&M SERVICE: CPT | Mod: 95,,, | Performed by: FAMILY MEDICINE

## 2020-05-30 PROCEDURE — 99499 RISK ADDL DX/OHS AUDIT: ICD-10-PCS | Mod: 95,,, | Performed by: FAMILY MEDICINE

## 2020-05-30 PROCEDURE — 1159F MED LIST DOCD IN RCRD: CPT | Mod: 95,,, | Performed by: FAMILY MEDICINE

## 2020-05-30 RX ORDER — OMEPRAZOLE 40 MG/1
40 CAPSULE, DELAYED RELEASE ORAL DAILY
Qty: 30 CAPSULE | Refills: 11 | Status: SHIPPED | OUTPATIENT
Start: 2020-05-30 | End: 2021-09-07

## 2020-05-30 NOTE — PROGRESS NOTES
Subjective:       Patient ID: Blaire Cage is a 89 y.o. female.    Chief Complaint: No chief complaint on file.    HPI  Review of Systems   Constitutional: Negative for fatigue and unexpected weight change.   Respiratory: Negative for chest tightness and shortness of breath.    Cardiovascular: Negative for chest pain, palpitations and leg swelling.   Gastrointestinal: Positive for abdominal pain, diarrhea and nausea. Negative for blood in stool and constipation.   Musculoskeletal: Positive for arthralgias.   Neurological: Negative for dizziness, syncope, light-headedness and headaches.       Patient Active Problem List   Diagnosis    Diabetic neuropathy, type II diabetes mellitus    CKD (chronic kidney disease), stage III, eGFR 39, progressive 31    Hypothyroidism    Hypertension associated with diabetes    Hyperlipidemia associated with type 2 diabetes mellitus    Type 2 diabetes mellitus with stage 4 chronic kidney disease    Right carotid bruit    COPD mixed type    Anxiety    Abdominal aortic aneurysm without rupture    Hip arthritis    Degenerative spinal arthritis    Osteoporosis    Left ventricular diastolic dysfunction with preserved systolic function    Hypertensive left ventricular hypertrophy, without heart failure    Smoker, quit 5/2016, 25 pck-years    Abdominal obesity    Absolute anemia    Body mass index (BMI) 23.0-23.9, adult, today 24.1    Iron deficiency anemia due to chronic blood loss    Proteinuria due to type 2 diabetes mellitus    History of syncope in childhood    Prerenal azotemia    Drug-induced constipation    COPD with acute exacerbation    Asthmatic bronchitis with acute exacerbation    Controlled type 2 diabetes mellitus, without long-term current use of insulin    Acute pulmonary embolism    Asthma-COPD overlap syndrome    Eosinophilic asthma    Moderate malnutrition    Type 2 diabetes mellitus with kidney complication, without long-term current  use of insulin    Chronic anticoagulation    Constipation    DVT (deep venous thrombosis)    History of pulmonary embolism    Anemia due to multiple mechanisms    Anemia, chronic disease    Normocytic normochromic anemia    Anemia, chronic renal failure, stage 3 (moderate)    Homocysteinemia    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    Hyperkalemia    Diastolic CHF, chronic    Anemia of chronic disease    Kidney stones    Bradycardia    Macrocytic anemia    Orthostatic hypotension    Closed left hip fracture    Hip pain, left    On home oxygen therapy    Nonrheumatic aortic valve stenosis    Mitral stenosis    Hypoalbuminemia    Hypoglycemia    CKD (chronic kidney disease), stage IV    Dizziness    Left foot pain    Posterior tibial tendon dysfunction, left    Arthritis     Patient is here for telemedicine visit  The patient location is: home in [place], [place]  The chief complaint leading to consultation is: foot pain f/u  Visit type: Virtual visit with synchronous audio and video  Total time spent with patient: 10-20 min  Each patient to whom he or she provides medical services by telemedicine is:  (1) informed of the relationship between the physician and patient and the respective role of any other health care provider with respect to management of the patient; and (2) notified that he or she may decline to receive medical services by telemedicine and may withdraw from such care at any time.    Notes: see below   AUDIO VISIT ONLY? NO      C/o abd pain epigastric area with eating lately. Takes iron chronically and it turns stool black but no blood in stool known.  Mild nausea.  Pureed bland food is best.  Mild occ diarrhea.  No fever    Foot pain -has seen Dabdoub. Not a candidate for surgery. Has new insert and has had help with voltaren gel and tylenol. Not using tramadol at this time  Objective:      Physical Exam   Constitutional: She appears well-developed and well-nourished.    Pulmonary/Chest: Effort normal.   Neurological: She is alert.   Psychiatric: She has a normal mood and affect. Her behavior is normal. Thought content normal.       Assessment:       1. Epigastric pain    2. Pain of foot, unspecified laterality        Plan:         1. Epigastric pain  R/o PUD/gastritis.  Counseled patient on prevention of reflux with changes in diet and behavior.  I recommended avoidance of greasy and spicy foods, caffeine and eating within 3 hours of bedtime.  I counseled the patient to avoid eating large meals and instead eating more frequent small meals.  I also recommended weight loss and elevation of the head of the bed by 6 inches.  If symptoms persist after these changes medication may be needed to control GERD.    Monitor for worsening symptoms and return to clinic or go to the ER if these occur: fever > 100.4, severe abdominal pain, intractable vomiting, bleeding from the rectum or black tarry stools, dehydration, lethargy or other worsening symptoms.  Refer for eval and treat  - Ambulatory referral/consult to Gastroenterology; Future  Start  - omeprazole (PRILOSEC) 40 MG capsule; Take 1 capsule (40 mg total) by mouth once daily.  Dispense: 30 capsule; Refill: 11    2. Pain of foot, unspecified laterality  Cont current mgmt        Time spent with patient: 20 minutes    Patient with be reevaluated in 4 weeks or sooner prn    Greater than 50% of this visit was spent counseling as described in above documentation:Yes

## 2020-06-01 ENCOUNTER — PATIENT OUTREACH (OUTPATIENT)
Dept: ADMINISTRATIVE | Facility: OTHER | Age: 85
End: 2020-06-01

## 2020-06-02 ENCOUNTER — OFFICE VISIT (OUTPATIENT)
Dept: GASTROENTEROLOGY | Facility: CLINIC | Age: 85
End: 2020-06-02
Payer: MEDICARE

## 2020-06-02 VITALS
HEART RATE: 61 BPM | SYSTOLIC BLOOD PRESSURE: 137 MMHG | DIASTOLIC BLOOD PRESSURE: 63 MMHG | HEIGHT: 64 IN | BODY MASS INDEX: 22.1 KG/M2 | WEIGHT: 129.44 LBS

## 2020-06-02 DIAGNOSIS — J44.89 ASTHMA-COPD OVERLAP SYNDROME: Primary | ICD-10-CM

## 2020-06-02 DIAGNOSIS — K59.00 CONSTIPATION, UNSPECIFIED CONSTIPATION TYPE: ICD-10-CM

## 2020-06-02 DIAGNOSIS — R10.13 EPIGASTRIC PAIN: ICD-10-CM

## 2020-06-02 DIAGNOSIS — K86.2 PANCREATIC CYST: ICD-10-CM

## 2020-06-02 DIAGNOSIS — D63.8 ANEMIA, CHRONIC DISEASE: ICD-10-CM

## 2020-06-02 DIAGNOSIS — R10.9 ABDOMINAL PAIN, UNSPECIFIED ABDOMINAL LOCATION: ICD-10-CM

## 2020-06-02 PROCEDURE — 3288F FALL RISK ASSESSMENT DOCD: CPT | Mod: CPTII,S$GLB,, | Performed by: INTERNAL MEDICINE

## 2020-06-02 PROCEDURE — 1159F PR MEDICATION LIST DOCUMENTED IN MEDICAL RECORD: ICD-10-PCS | Mod: S$GLB,,, | Performed by: INTERNAL MEDICINE

## 2020-06-02 PROCEDURE — 3288F PR FALLS RISK ASSESSMENT DOCUMENTED: ICD-10-PCS | Mod: CPTII,S$GLB,, | Performed by: INTERNAL MEDICINE

## 2020-06-02 PROCEDURE — 1125F AMNT PAIN NOTED PAIN PRSNT: CPT | Mod: S$GLB,,, | Performed by: INTERNAL MEDICINE

## 2020-06-02 PROCEDURE — 99999 PR PBB SHADOW E&M-EST. PATIENT-LVL III: ICD-10-PCS | Mod: PBBFAC,,, | Performed by: INTERNAL MEDICINE

## 2020-06-02 PROCEDURE — 1100F PR PT FALLS ASSESS DOC 2+ FALLS/FALL W/INJURY/YR: ICD-10-PCS | Mod: CPTII,S$GLB,, | Performed by: INTERNAL MEDICINE

## 2020-06-02 PROCEDURE — 99214 PR OFFICE/OUTPT VISIT, EST, LEVL IV, 30-39 MIN: ICD-10-PCS | Mod: S$GLB,,, | Performed by: INTERNAL MEDICINE

## 2020-06-02 PROCEDURE — 1125F PR PAIN SEVERITY QUANTIFIED, PAIN PRESENT: ICD-10-PCS | Mod: S$GLB,,, | Performed by: INTERNAL MEDICINE

## 2020-06-02 PROCEDURE — 99214 OFFICE O/P EST MOD 30 MIN: CPT | Mod: S$GLB,,, | Performed by: INTERNAL MEDICINE

## 2020-06-02 PROCEDURE — 1100F PTFALLS ASSESS-DOCD GE2>/YR: CPT | Mod: CPTII,S$GLB,, | Performed by: INTERNAL MEDICINE

## 2020-06-02 PROCEDURE — 99999 PR PBB SHADOW E&M-EST. PATIENT-LVL III: CPT | Mod: PBBFAC,,, | Performed by: INTERNAL MEDICINE

## 2020-06-02 PROCEDURE — 1159F MED LIST DOCD IN RCRD: CPT | Mod: S$GLB,,, | Performed by: INTERNAL MEDICINE

## 2020-06-02 NOTE — PROGRESS NOTES
Subjective:       Patient ID: Blaire Cage is a 89 y.o. female.    This is an established patient.      Chief Complaint: Abdominal pain    PAST HISTORY:    Abdominal Pain   This is a new problem. The current episode started in the past 7 days. The onset quality is sudden. The problem occurs daily. Duration: variable. The problem has been gradually improving. The pain is located in the generalized abdominal region. The pain is at a severity of 5/10. The pain is moderate. The quality of the pain is aching and cramping. The abdominal pain does not radiate. Associated symptoms include constipation. Pertinent negatives include no arthralgias, diarrhea, dysuria, fever, hematuria, myalgias, nausea or vomiting. Nothing aggravates the pain. The pain is relieved by nothing. She has tried nothing for the symptoms. The treatment provided no relief. Prior diagnostic workup includes CT scan. Her past medical history is significant for GERD.     Abdominal pain and diarrhea have completely resolved. She has no symptoms and is feeling well.  She did not submit stool sample as diarrhea ceased.  No other acute issues.    INTERVAL HISTORY:  Since last visit, she relates that she has had recurrence of abdominal pain, generalized, associated with worse constipation than she typically has at baseline.  She also admits to some loose stool which sounds consistent with overflow diarrhea secondary to underlying chronic constipation.  Her daughter was present as well and states that she agrees.  Patient denies bleeding, weight loss, or UGI symptoms.  She has history of cystic pancreatic lesion which appeared stable on imaging in 02/2020.  She states that, given her advanced age, she would like to avoid any unnecessary procedures.    Review of Systems   Constitutional: Negative for chills, fatigue and fever.   Respiratory: Negative for cough, shortness of breath and wheezing.    Cardiovascular: Negative for chest pain and palpitations.  "  Gastrointestinal: Positive for abdominal pain and constipation. Negative for blood in stool, diarrhea, nausea and vomiting.   Genitourinary: Negative for dysuria and hematuria.   Musculoskeletal: Negative for arthralgias and myalgias.   Skin: Negative for color change and rash.   Psychiatric/Behavioral: The patient is not nervous/anxious.    All other systems reviewed and are negative.      Objective:       Vitals:    06/02/20 1448   BP: 137/63   Pulse: 61   Weight: 58.7 kg (129 lb 6.6 oz)   Height: 5' 4" (1.626 m)       Physical Exam   Constitutional: She is oriented to person, place, and time. She appears well-developed.   Thin elderly female who appears tired and mildly ill.   HENT:   Head: Normocephalic and atraumatic.   Eyes: Pupils are equal, round, and reactive to light. No scleral icterus.   Cardiovascular: Normal rate and regular rhythm.   No murmur heard.  Pulmonary/Chest: Effort normal and breath sounds normal. She has no wheezes.   Abdominal: Soft. Bowel sounds are normal. She exhibits no distension. There is no tenderness.   Neurological: She is alert and oriented to person, place, and time.   Vitals reviewed.        Further history was obtained from the patient's daughter who was present throughout the interview and states that symptoms have completely resolved and she is eating well.  History is otherwise as above in the HPI.             Lab Results   Component Value Date    WBC 8.98 05/15/2020    HGB 9.7 (L) 05/15/2020    HCT 30.3 (L) 05/15/2020    MCV 96 05/15/2020     05/15/2020       CMP  Sodium   Date Value Ref Range Status   05/15/2020 133 (L) 136 - 145 mmol/L Final     Potassium   Date Value Ref Range Status   05/15/2020 5.1 3.5 - 5.1 mmol/L Final     Chloride   Date Value Ref Range Status   05/15/2020 98 95 - 110 mmol/L Final     CO2   Date Value Ref Range Status   05/15/2020 24 23 - 29 mmol/L Final     Glucose   Date Value Ref Range Status   05/15/2020 83 70 - 110 mg/dL Final "     BUN, Bld   Date Value Ref Range Status   05/15/2020 52 (H) 8 - 23 mg/dL Final     Creatinine   Date Value Ref Range Status   05/15/2020 1.9 (H) 0.5 - 1.4 mg/dL Final   03/04/2013 1.1 0.5 - 1.4 mg/dL Final     Calcium   Date Value Ref Range Status   05/15/2020 9.3 8.7 - 10.5 mg/dL Final   03/04/2013 9.8 8.7 - 10.5 mg/dL Final     Total Protein   Date Value Ref Range Status   05/15/2020 7.1 6.0 - 8.4 g/dL Final     Albumin   Date Value Ref Range Status   05/15/2020 3.5 3.5 - 5.2 g/dL Final     Total Bilirubin   Date Value Ref Range Status   05/15/2020 0.3 0.1 - 1.0 mg/dL Final     Comment:     For infants and newborns, interpretation of results should be based  on gestational age, weight and in agreement with clinical  observations.  Premature Infant recommended reference ranges:  Up to 24 hours.............<8.0 mg/dL  Up to 48 hours............<12.0 mg/dL  3-5 days..................<15.0 mg/dL  6-29 days.................<15.0 mg/dL       Alkaline Phosphatase   Date Value Ref Range Status   05/15/2020 76 55 - 135 U/L Final   03/04/2013 60 55 - 135 U/L Final     AST   Date Value Ref Range Status   05/15/2020 30 10 - 40 U/L Final   03/04/2013 21 10 - 40 U/L Final     ALT   Date Value Ref Range Status   05/15/2020 20 10 - 44 U/L Final     Anion Gap   Date Value Ref Range Status   05/15/2020 11 8 - 16 mmol/L Final   03/04/2013 10 5 - 15 meq/L Final     eGFR if    Date Value Ref Range Status   05/15/2020 27 (A) >60 mL/min/1.73 m^2 Final     eGFR if non    Date Value Ref Range Status   05/15/2020 23 (A) >60 mL/min/1.73 m^2 Final     Comment:     Calculation used to obtain the estimated glomerular filtration  rate (eGFR) is the CKD-EPI equation.            Assessment:       1. Asthma-COPD overlap syndrome    2. Epigastric pain    3. Anemia, chronic disease    4. Constipation, unspecified constipation type    5. Abdominal pain, unspecified abdominal location    6. Pancreatic cyst         Plan:       1.  Continue current medications  2.  Hydrate  3.  Will give trial of low dose Linzess  4.  No endoscopy recommended at this time given absence of alarm features and advanced age.  5.  Follow up in my office in 4 weeks.    .

## 2020-06-02 NOTE — LETTER
June 2, 2020      Eli Edmonds MD  [address]  [place] [address]           [place] [facility] - Gastroenterology  [address]  [place] [address]  Phone: [phone]          Patient: Blaire Cage   MR Number: [id number]   YOB: 1930   Date of Visit: 6/2/2020       Dear Dr. Eli Edmonds:    Thank you for referring Blaire Cage to me for evaluation. Attached you will find relevant portions of my assessment and plan of care.    If you have questions, please do not hesitate to call me. I look forward to following Blaire Cage along with you.    Sincerely,    Fadi Flores MD    Enclosure  CC:  No Recipients    If you would like to receive this communication electronically, please contact [email or web address] or [phone] to request more information on [email or web address] Link access.    For providers and/or their staff who would like to refer a patient to [facility], please contact us through our one-stop-shop provider referral line, [facility] [place], at [phone].    If you feel you have received this communication in error or would no longer like to receive these types of communications, please e-mail [email or web address]

## 2020-06-10 DIAGNOSIS — J44.9 COPD MIXED TYPE: Primary | ICD-10-CM

## 2020-06-10 DIAGNOSIS — J44.1 COPD EXACERBATION: ICD-10-CM

## 2020-06-10 RX ORDER — PREDNISONE 20 MG/1
20 TABLET ORAL DAILY
Qty: 10 TABLET | Refills: 0 | Status: SHIPPED | OUTPATIENT
Start: 2020-06-10 | End: 2020-06-20

## 2020-06-11 ENCOUNTER — LAB VISIT (OUTPATIENT)
Dept: LAB | Facility: HOSPITAL | Age: 85
End: 2020-06-11
Attending: INTERNAL MEDICINE
Payer: MEDICARE

## 2020-06-11 DIAGNOSIS — D63.1 ANEMIA, CHRONIC RENAL FAILURE, STAGE 3 (MODERATE): ICD-10-CM

## 2020-06-11 DIAGNOSIS — D64.9 ANEMIA, UNSPECIFIED TYPE: ICD-10-CM

## 2020-06-11 DIAGNOSIS — D53.9 MACROCYTIC ANEMIA: ICD-10-CM

## 2020-06-11 DIAGNOSIS — D50.0 IRON DEFICIENCY ANEMIA DUE TO CHRONIC BLOOD LOSS: ICD-10-CM

## 2020-06-11 DIAGNOSIS — D64.9 NORMOCYTIC NORMOCHROMIC ANEMIA: ICD-10-CM

## 2020-06-11 DIAGNOSIS — N18.30 ANEMIA, CHRONIC RENAL FAILURE, STAGE 3 (MODERATE): ICD-10-CM

## 2020-06-11 DIAGNOSIS — D63.8 ANEMIA OF CHRONIC DISEASE: ICD-10-CM

## 2020-06-11 DIAGNOSIS — D64.89 ANEMIA DUE TO MULTIPLE MECHANISMS: ICD-10-CM

## 2020-06-11 LAB
BASOPHILS # BLD AUTO: 0.03 K/UL (ref 0–0.2)
BASOPHILS NFR BLD: 0.5 % (ref 0–1.9)
DIFFERENTIAL METHOD: ABNORMAL
EOSINOPHIL # BLD AUTO: 0.3 K/UL (ref 0–0.5)
EOSINOPHIL NFR BLD: 5.2 % (ref 0–8)
ERYTHROCYTE [DISTWIDTH] IN BLOOD BY AUTOMATED COUNT: 13.9 % (ref 11.5–14.5)
HCT VFR BLD AUTO: 29.9 % (ref 37–48.5)
HGB BLD-MCNC: 9.1 G/DL (ref 12–16)
IMM GRANULOCYTES # BLD AUTO: 0.03 K/UL (ref 0–0.04)
IMM GRANULOCYTES NFR BLD AUTO: 0.5 % (ref 0–0.5)
LYMPHOCYTES # BLD AUTO: 1.5 K/UL (ref 1–4.8)
LYMPHOCYTES NFR BLD: 23.4 % (ref 18–48)
MCH RBC QN AUTO: 31.3 PG (ref 27–31)
MCHC RBC AUTO-ENTMCNC: 30.4 G/DL (ref 32–36)
MCV RBC AUTO: 103 FL (ref 82–98)
MONOCYTES # BLD AUTO: 0.8 K/UL (ref 0.3–1)
MONOCYTES NFR BLD: 12 % (ref 4–15)
NEUTROPHILS # BLD AUTO: 3.9 K/UL (ref 1.8–7.7)
NEUTROPHILS NFR BLD: 58.4 % (ref 38–73)
NRBC BLD-RTO: 0 /100 WBC
PLATELET # BLD AUTO: 165 K/UL (ref 150–350)
PMV BLD AUTO: 11.7 FL (ref 9.2–12.9)
RBC # BLD AUTO: 2.91 M/UL (ref 4–5.4)
WBC # BLD AUTO: 6.59 K/UL (ref 3.9–12.7)

## 2020-06-11 PROCEDURE — 85025 COMPLETE CBC W/AUTO DIFF WBC: CPT

## 2020-06-11 PROCEDURE — 82728 ASSAY OF FERRITIN: CPT

## 2020-06-11 PROCEDURE — 80053 COMPREHEN METABOLIC PANEL: CPT

## 2020-06-11 PROCEDURE — 36415 COLL VENOUS BLD VENIPUNCTURE: CPT | Mod: PO

## 2020-06-11 PROCEDURE — 83540 ASSAY OF IRON: CPT

## 2020-06-12 LAB
ALBUMIN SERPL BCP-MCNC: 3.3 G/DL (ref 3.5–5.2)
ALP SERPL-CCNC: 62 U/L (ref 55–135)
ALT SERPL W/O P-5'-P-CCNC: 16 U/L (ref 10–44)
ANION GAP SERPL CALC-SCNC: 8 MMOL/L (ref 8–16)
AST SERPL-CCNC: 24 U/L (ref 10–40)
BILIRUB SERPL-MCNC: 0.2 MG/DL (ref 0.1–1)
BUN SERPL-MCNC: 62 MG/DL (ref 8–23)
CALCIUM SERPL-MCNC: 9.2 MG/DL (ref 8.7–10.5)
CHLORIDE SERPL-SCNC: 107 MMOL/L (ref 95–110)
CO2 SERPL-SCNC: 23 MMOL/L (ref 23–29)
CREAT SERPL-MCNC: 1.8 MG/DL (ref 0.5–1.4)
EST. GFR  (AFRICAN AMERICAN): 28.4 ML/MIN/1.73 M^2
EST. GFR  (NON AFRICAN AMERICAN): 24.6 ML/MIN/1.73 M^2
FERRITIN SERPL-MCNC: 156 NG/ML (ref 20–300)
GLUCOSE SERPL-MCNC: 101 MG/DL (ref 70–110)
IRON SERPL-MCNC: 102 UG/DL (ref 30–160)
POTASSIUM SERPL-SCNC: 4.7 MMOL/L (ref 3.5–5.1)
PROT SERPL-MCNC: 6.5 G/DL (ref 6–8.4)
SATURATED IRON: 30 % (ref 20–50)
SODIUM SERPL-SCNC: 138 MMOL/L (ref 136–145)
TOTAL IRON BINDING CAPACITY: 337 UG/DL (ref 250–450)
TRANSFERRIN SERPL-MCNC: 228 MG/DL (ref 200–375)

## 2020-06-27 ENCOUNTER — CLINICAL SUPPORT (OUTPATIENT)
Dept: CARDIOLOGY | Facility: CLINIC | Age: 85
End: 2020-06-27
Payer: MEDICARE

## 2020-06-27 DIAGNOSIS — Z95.818 PRESENCE OF OTHER CARDIAC IMPLANTS AND GRAFTS: ICD-10-CM

## 2020-06-27 PROCEDURE — 93298 REM INTERROG DEV EVAL SCRMS: CPT | Mod: S$GLB,,, | Performed by: INTERNAL MEDICINE

## 2020-06-27 PROCEDURE — 93298 CARDIAC DEVICE CHECK - REMOTE: ICD-10-PCS | Mod: S$GLB,,, | Performed by: INTERNAL MEDICINE

## 2020-06-29 ENCOUNTER — OFFICE VISIT (OUTPATIENT)
Dept: FAMILY MEDICINE | Facility: CLINIC | Age: 85
End: 2020-06-29
Payer: MEDICARE

## 2020-06-29 ENCOUNTER — HOSPITAL ENCOUNTER (OUTPATIENT)
Dept: RADIOLOGY | Facility: CLINIC | Age: 85
Discharge: HOME | End: 2020-06-29
Attending: NURSE PRACTITIONER
Payer: MEDICARE

## 2020-06-29 VITALS
BODY MASS INDEX: 22.17 KG/M2 | OXYGEN SATURATION: 96 % | HEIGHT: 64 IN | TEMPERATURE: 97 F | WEIGHT: 129.88 LBS | SYSTOLIC BLOOD PRESSURE: 152 MMHG | HEART RATE: 71 BPM | DIASTOLIC BLOOD PRESSURE: 42 MMHG

## 2020-06-29 DIAGNOSIS — E11.69 HYPERLIPIDEMIA ASSOCIATED WITH TYPE 2 DIABETES MELLITUS: ICD-10-CM

## 2020-06-29 DIAGNOSIS — R10.32 BILATERAL GROIN PAIN: Primary | ICD-10-CM

## 2020-06-29 DIAGNOSIS — E11.40 TYPE 2 DIABETES MELLITUS WITH DIABETIC NEUROPATHY, WITHOUT LONG-TERM CURRENT USE OF INSULIN: ICD-10-CM

## 2020-06-29 DIAGNOSIS — E78.5 HYPERLIPIDEMIA ASSOCIATED WITH TYPE 2 DIABETES MELLITUS: ICD-10-CM

## 2020-06-29 DIAGNOSIS — E03.9 HYPOTHYROIDISM, UNSPECIFIED TYPE: ICD-10-CM

## 2020-06-29 DIAGNOSIS — R10.31 BILATERAL GROIN PAIN: ICD-10-CM

## 2020-06-29 DIAGNOSIS — E11.59 HYPERTENSION ASSOCIATED WITH DIABETES: ICD-10-CM

## 2020-06-29 DIAGNOSIS — J44.9 COPD MIXED TYPE: ICD-10-CM

## 2020-06-29 DIAGNOSIS — I15.2 HYPERTENSION ASSOCIATED WITH DIABETES: ICD-10-CM

## 2020-06-29 DIAGNOSIS — R10.32 BILATERAL GROIN PAIN: ICD-10-CM

## 2020-06-29 DIAGNOSIS — R10.31 BILATERAL GROIN PAIN: Primary | ICD-10-CM

## 2020-06-29 DIAGNOSIS — R30.0 DYSURIA: ICD-10-CM

## 2020-06-29 LAB
BILIRUB SERPL-MCNC: ABNORMAL MG/DL
BLOOD URINE, POC: ABNORMAL
CLARITY, POC UA: ABNORMAL
COLOR, POC UA: YELLOW
GLUCOSE UR QL STRIP: ABNORMAL
KETONES UR QL STRIP: ABNORMAL
LEUKOCYTE ESTERASE URINE, POC: ABNORMAL
NITRITE, POC UA: ABNORMAL
PH, POC UA: 5
PROTEIN, POC: ABNORMAL
SPECIFIC GRAVITY, POC UA: 1.01
UROBILINOGEN, POC UA: ABNORMAL

## 2020-06-29 PROCEDURE — 73521 X-RAY EXAM HIPS BI 2 VIEWS: CPT | Mod: 26,,, | Performed by: RADIOLOGY

## 2020-06-29 PROCEDURE — 99214 PR OFFICE/OUTPT VISIT, EST, LEVL IV, 30-39 MIN: ICD-10-PCS | Mod: S$GLB,25,, | Performed by: NURSE PRACTITIONER

## 2020-06-29 PROCEDURE — 73521 XR HIPS BILATERAL 2 VIEW INCL AP PELVIS: ICD-10-PCS | Mod: 26,,, | Performed by: RADIOLOGY

## 2020-06-29 PROCEDURE — 1101F PT FALLS ASSESS-DOCD LE1/YR: CPT | Mod: CPTII,S$GLB,, | Performed by: NURSE PRACTITIONER

## 2020-06-29 PROCEDURE — 81002 POCT URINE DIPSTICK WITHOUT MICROSCOPE: ICD-10-PCS | Mod: S$GLB,,, | Performed by: NURSE PRACTITIONER

## 2020-06-29 PROCEDURE — 99214 OFFICE O/P EST MOD 30 MIN: CPT | Mod: S$GLB,25,, | Performed by: NURSE PRACTITIONER

## 2020-06-29 PROCEDURE — 1101F PR PT FALLS ASSESS DOC 0-1 FALLS W/OUT INJ PAST YR: ICD-10-PCS | Mod: CPTII,S$GLB,, | Performed by: NURSE PRACTITIONER

## 2020-06-29 PROCEDURE — 1159F PR MEDICATION LIST DOCUMENTED IN MEDICAL RECORD: ICD-10-PCS | Mod: S$GLB,,, | Performed by: NURSE PRACTITIONER

## 2020-06-29 PROCEDURE — 1125F PR PAIN SEVERITY QUANTIFIED, PAIN PRESENT: ICD-10-PCS | Mod: S$GLB,,, | Performed by: NURSE PRACTITIONER

## 2020-06-29 PROCEDURE — 1125F AMNT PAIN NOTED PAIN PRSNT: CPT | Mod: S$GLB,,, | Performed by: NURSE PRACTITIONER

## 2020-06-29 PROCEDURE — 81002 URINALYSIS NONAUTO W/O SCOPE: CPT | Mod: S$GLB,,, | Performed by: NURSE PRACTITIONER

## 2020-06-29 PROCEDURE — 99999 PR PBB SHADOW E&M-EST. PATIENT-LVL III: CPT | Mod: PBBFAC,,, | Performed by: NURSE PRACTITIONER

## 2020-06-29 PROCEDURE — 1159F MED LIST DOCD IN RCRD: CPT | Mod: S$GLB,,, | Performed by: NURSE PRACTITIONER

## 2020-06-29 PROCEDURE — 99999 PR PBB SHADOW E&M-EST. PATIENT-LVL III: ICD-10-PCS | Mod: PBBFAC,,, | Performed by: NURSE PRACTITIONER

## 2020-06-29 PROCEDURE — 73521 X-RAY EXAM HIPS BI 2 VIEWS: CPT | Mod: TC,FY,PO

## 2020-06-29 RX ORDER — PREDNISONE 20 MG/1
20 TABLET ORAL DAILY
Qty: 12 TABLET | Refills: 0 | Status: SHIPPED | OUTPATIENT
Start: 2020-06-29 | End: 2020-11-27 | Stop reason: SDUPTHER

## 2020-06-29 RX ORDER — AMOXICILLIN AND CLAVULANATE POTASSIUM 250; 125 MG/1; MG/1
1 TABLET, FILM COATED ORAL EVERY 12 HOURS
Qty: 14 TABLET | Refills: 0 | Status: SHIPPED | OUTPATIENT
Start: 2020-06-29 | End: 2020-07-06

## 2020-06-29 NOTE — PROGRESS NOTES
Subjective:       Patient ID: Blaire Cage is a 89 y.o. female.    Chief Complaint: Hypertension    Groin Pain  The patient's pertinent negatives include no genital itching, genital lesions, genital odor or genital rash. This is a new problem. The current episode started in the past 7 days. The problem occurs intermittently (at night). The problem has been unchanged. The pain is severe. The problem affects both sides. She is not pregnant. Pertinent negatives include no abdominal pain, constipation, diarrhea, discolored urine, flank pain, frequency, headaches, hematuria, nausea, rash or vomiting. The vaginal discharge was yellow. The symptoms are aggravated by urinating. She is not sexually active.       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 8/7/2018    Hip arthritis 3/1/2016    Homocysteinemia 8/7/2018    Hyperlipidemia     Hypertension     Normocytic normochromic anemia 6/29/2018    PE (pulmonary thromboembolism) 06/09/2018    Pneumonia of right lower lobe due to infectious organism 9/11/2017    Thyroid disease     hypothyroid    Type 2 diabetes mellitus with stage 3 chronic kidney disease 1/18/2013       Review of patient's allergies indicates:   Allergen Reactions    Carvedilol Other (See Comments)     Bradycardia and syncope    Boniva [ibandronate]     Codeine     Hydralazine analogues     Iodinated contrast media Hives    Kionex [sodium polystyrene sulfonate] Diarrhea     From encounter 12/11/17 for  diarrhea--not true allergy.    Lisinopril     Morphine          Current Outpatient Medications:     acetaminophen (TYLENOL) 325 MG tablet, Take 2 tablets (650 mg total) by mouth every 4 (four) hours as needed. (Patient taking differently: Take 650 mg by mouth every 4 (four) hours as needed for Pain. ), Disp: , Rfl: 0    albuterol-ipratropium (DUO-NEB) 2.5 mg-0.5 mg/3 mL nebulizer solution, Take 3 mLs by nebulization every 6 (six) hours as needed for Wheezing. Rescue, Disp: 120 vial, Rfl: 11    amLODIPine (NORVASC) 5 MG tablet, Take 1 tablet (5 mg total) by mouth 2 (two) times daily., Disp: 180 tablet, Rfl: 3    apixaban (ELIQUIS) 5 mg Tab, Take 1 tablet (5 mg total) by mouth 2 (two) times daily., Disp: 180 tablet, Rfl: 3    ascorbic acid (VITAMIN C) 500 MG tablet, Take 500 mg by mouth once daily.  , Disp: , Rfl:     aspirin (ECOTRIN) 81 MG EC tablet, Take 81 mg by mouth once daily.  , Disp: , Rfl:     calcium carbonate (OS-TASIA) 500 mg calcium (1,250 mg) tablet, Take 1 tablet by mouth 2 (two) times daily.  , Disp: , Rfl:     cyanocobalamin/folic acid (FOLTRATE ORAL), vitamin B12-folic acid  2.5 - 25-2MG, Disp: , Rfl:     diclofenac sodium (VOLTAREN) 1 % Gel, Apply 2 g topically once daily., Disp: 100 g, Rfl: prn    docusate sodium (COLACE) 100 MG capsule, Take 1 capsule (100 mg total) by mouth 2 (two) times daily., Disp: 90 capsule, Rfl: 0    ferrous sulfate (FEOSOL) 325 mg (65 mg iron) Tab tablet, Take 1 tablet (325 mg total) by mouth 2 (two) times daily with meals., Disp: 180 tablet, Rfl: 3    fish oil-omega-3 fatty acids 300-1,000 mg capsule, Take 2 g by mouth every evening. , Disp: , Rfl:     fluocinolone (SYNALAR) 0.01 % external solution, Apply topically 2 (two) times daily., Disp: 90 mL, Rfl: 1    fluticasone (FLONASE) 50 mcg/actuation nasal spray, 2 sprays by Each Nare route once daily., Disp: 48 g, Rfl: 3    fluticasone-umeclidin-vilanter (TRELEGY ELLIPTA) 100-62.5-25 mcg DsDv, Inhale 1 puff  into the lungs once daily., Disp: 60 each, Rfl: 11    folic acid-vit B6-vit B12 2.5-25-2 mg (FOLBIC) 2.5-25-2 mg Tab, Take 1 tablet by mouth once daily., Disp: 90 tablet, Rfl: 3    furosemide (LASIX) 20 MG tablet, Take 1 tablet only as needed, for swelling, increase SOB, weight gain of 3 lbs overnight or 5 lbs for the week, Disp: 24 tablet, Rfl: 3    gabapentin (NEURONTIN) 300 MG capsule, Take 1 capsule (300 mg total) by mouth every evening., Disp: 90 capsule, Rfl: 3    glipiZIDE (GLUCOTROL) 2.5 MG TR24, Take 2.5 mg by mouth once daily., Disp: , Rfl:     levothyroxine (SYNTHROID) 88 MCG tablet, Take 88 mcg by mouth once daily., Disp: , Rfl:     lidocaine (LIDODERM) 5 %, Place 1 patch onto the skin once daily. Remove & Discard patch within 12 hours or as directed by MD, Disp: 3 patch, Rfl: 0    linaCLOtide (LINZESS) 72 mcg Cap capsule, Take 1 capsule (72 mcg total) by mouth once daily., Disp: 90 capsule, Rfl: 3    magnesium oxide (MAG-OX) 400 mg (241.3 mg magnesium) tablet, TAKE 1 TABLET BY MOUTH ONCE DAILY, Disp: 90 tablet, Rfl: 3    meclizine (ANTIVERT) 25 mg tablet, Take 1 tablet (25 mg total) by mouth 3 (three) times daily as needed., Disp: 20 tablet, Rfl: 0    montelukast (SINGULAIR) 10 mg tablet, TAKE 1 TABLET BY MOUTH ONCE DAILY IN THE EVENING, Disp: 90 tablet, Rfl: 1    multivitamin (THERAGRAN) tablet, Take 1 tablet by mouth once daily., Disp: , Rfl:     mupirocin (BACTROBAN) 2 % ointment, Apply topically 2 (two) times daily., Disp: 30 g, Rfl: 1    nitroGLYCERIN (NITROSTAT) 0.4 MG SL tablet, Place 1 tablet (0.4 mg total) under the tongue every 5 (five) minutes as needed for Chest pain. As needed (Patient taking differently: Place 0.4 mg under the tongue every 5 (five) minutes as needed for Chest pain. Seek medical help if pain is not relieved after the third dose.), Disp: 30 tablet, Rfl: 3    omeprazole (PRILOSEC) 40 MG capsule, Take 1 capsule (40 mg total) by mouth once daily., Disp: 30  "capsule, Rfl: 11    sertraline (ZOLOFT) 25 MG tablet, Take 1 tablet (25 mg total) by mouth once daily., Disp: 90 tablet, Rfl: 3    simvastatin (ZOCOR) 40 MG tablet, TAKE 1 TABLET BY MOUTH ONCE DAILY IN THE EVENING, Disp: 90 tablet, Rfl: 3    traMADoL (ULTRAM) 50 mg tablet, Take 1 tablet (50 mg total) by mouth every 8 (eight) hours as needed for Pain., Disp: 9 tablet, Rfl: 0    vitamin D 1000 units Tab, Take 1 tablet (1,000 Units total) by mouth once daily., Disp: 90 tablet, Rfl: 3    Review of Systems   Constitutional: Negative for unexpected weight change.   HENT: Negative for trouble swallowing.    Eyes: Negative for visual disturbance.   Respiratory: Negative for shortness of breath.    Cardiovascular: Negative for chest pain, palpitations and leg swelling.   Gastrointestinal: Negative for abdominal pain, blood in stool, constipation, diarrhea, nausea and vomiting.   Genitourinary: Negative for flank pain, frequency and hematuria.   Musculoskeletal: Positive for arthralgias.   Skin: Negative for rash.   Allergic/Immunologic: Negative for immunocompromised state.   Neurological: Negative for headaches.   Hematological: Does not bruise/bleed easily.   Psychiatric/Behavioral: Negative for agitation. The patient is not nervous/anxious.        Objective:      BP (!) 152/42 (BP Location: Left arm, Patient Position: Sitting, BP Method: Medium (Manual))   Pulse 71   Temp 97.2 °F (36.2 °C) (Other (see comments))   Ht 5' 4" (1.626 m)   Wt 58.9 kg (129 lb 13.6 oz)   LMP  (LMP Unknown)   SpO2 96%   BMI 22.29 kg/m²   Physical Exam  Constitutional:       Appearance: She is well-developed.   Eyes:      Pupils: Pupils are equal, round, and reactive to light.   Neck:      Musculoskeletal: Normal range of motion.   Cardiovascular:      Rate and Rhythm: Normal rate and regular rhythm.      Heart sounds: Normal heart sounds.   Pulmonary:      Effort: Pulmonary effort is normal.      Breath sounds: Normal breath sounds. "   Abdominal:      General: Bowel sounds are normal.      Palpations: Abdomen is soft.   Musculoskeletal: Normal range of motion.      Right hip: She exhibits tenderness.      Left hip: She exhibits tenderness.   Skin:     General: Skin is warm and dry.   Neurological:      Mental Status: She is alert and oriented to person, place, and time.   Psychiatric:         Behavior: Behavior normal.         Thought Content: Thought content normal.         Judgment: Judgment normal.         Assessment:       1. Bilateral groin pain    2. Dysuria    3. COPD mixed type    4. Hypertension associated with diabetes    5. Type 2 diabetes mellitus with diabetic neuropathy, without long-term current use of insulin    6. Hypothyroidism, unspecified type    7. Hyperlipidemia associated with type 2 diabetes mellitus    8. BMI 22.0-22.9, adult        Plan:       Bilateral groin pain  -     X-Ray Hips Bilateral 2 View Incl AP Pelvis; Future; Expected date: 06/29/2020  -     US Abdomen Limited_Hernia; Future; Expected date: 06/29/2020    Dysuria  -     POCT URINE DIPSTICK WITHOUT MICROSCOPE  -     amoxicillin-clavulanate 250-125mg (AUGMENTIN) 250-125 mg Tab; Take 1 tablet by mouth every 12 (twelve) hours. for 7 days  Dispense: 14 tablet; Refill: 0  -     Urine culture    COPD mixed type  -     predniSONE (DELTASONE) 20 MG tablet; Take 1 tablet (20 mg total) by mouth once daily. for 3 days  Dispense: 12 tablet; Refill: 0    Hypertension associated with diabetes  BP elevated today, could be related to bilateral groin pain . Last BP readings are stable.  Low sodium diet  BP Readings from Last 3 Encounters:   06/29/20 (!) 152/42   06/02/20 137/63   05/19/20 139/60     Type 2 diabetes mellitus with diabetic neuropathy, without long-term current use of insulin  Stable, continue medicaiton  Hemoglobin A1C   Date Value Ref Range Status   05/08/2020 5.7 (H) 4.0 - 5.6 % Final     Comment:     ADA Screening Guidelines:  5.7-6.4%  Consistent with  prediabetes  >or=6.5%  Consistent with diabetes  High levels of fetal hemoglobin interfere with the HbA1C  assay. Heterozygous hemoglobin variants (HbS, HgC, etc)do  not significantly interfere with this assay.   However, presence of multiple variants may affect accuracy.     12/14/2019 6.0 (H) 4.0 - 5.6 % Final     Comment:     ADA Screening Guidelines:  5.7-6.4%  Consistent with prediabetes  >or=6.5%  Consistent with diabetes  High levels of fetal hemoglobin interfere with the HbA1C  assay. Heterozygous hemoglobin variants (HbS, HgC, etc)do  not significantly interfere with this assay.   However, presence of multiple variants may affect accuracy.     03/22/2019 6.2 (H) 4.0 - 5.6 % Final     Comment:     ADA Screening Guidelines:  5.7-6.4%  Consistent with prediabetes  >or=6.5%  Consistent with diabetes  High levels of fetal hemoglobin interfere with the HbA1C  assay. Heterozygous hemoglobin variants (HbS, HgC, etc)do  not significantly interfere with this assay.   However, presence of multiple variants may affect accuracy.       Hypothyroidism, unspecified type  Stable, continue medicaiton  Hyperlipidemia associated with type 2 diabetes mellitus  On Zocor  BMI 22.0-22.9, adult  Healthy weight in patient        Patient readiness: acceptance and barriers:none    During the course of the visit the patient was educated and counseled about the following:     Diabetes:  Discussed general issues about diabetes pathophysiology and management.  Encouraged aerobic exercise.  Hypertension:   Dietary sodium restriction.  Regular aerobic exercise.    Goals: Diabetes: Maintain Hemoglobin A1C below 7 and Hypertension: Reduce Blood Pressure    Did patient meet goals/outcomes: No    The following self management tools provided: declined    Patient Instructions (the written plan) was given to the patient/family.     Time spent with patient: 15 minutes    Barriers to medications present (no )    Adverse reactions to current  medications (no)    Over the counter medications reviewed (Yes)

## 2020-06-30 ENCOUNTER — PATIENT OUTREACH (OUTPATIENT)
Dept: ADMINISTRATIVE | Facility: OTHER | Age: 85
End: 2020-06-30

## 2020-06-30 NOTE — PROGRESS NOTES
Chart was reviewed for overdue [facility]active [facility] Encounters ([facility])  topics  Updates were requested from care everywhere  Health Maintenance has been updated

## 2020-07-01 ENCOUNTER — HOSPITAL ENCOUNTER (OUTPATIENT)
Dept: RADIOLOGY | Facility: CLINIC | Age: 85
Discharge: HOME | End: 2020-07-01
Attending: NURSE PRACTITIONER
Payer: MEDICARE

## 2020-07-01 DIAGNOSIS — R10.31 BILATERAL GROIN PAIN: ICD-10-CM

## 2020-07-01 DIAGNOSIS — R10.32 BILATERAL GROIN PAIN: ICD-10-CM

## 2020-07-01 PROCEDURE — 76705 US ABDOMEN LIMITED_HERNIA: ICD-10-PCS | Mod: 26,,, | Performed by: RADIOLOGY

## 2020-07-01 PROCEDURE — 76705 ECHO EXAM OF ABDOMEN: CPT | Mod: TC,PO

## 2020-07-01 PROCEDURE — 76705 ECHO EXAM OF ABDOMEN: CPT | Mod: 26,,, | Performed by: RADIOLOGY

## 2020-07-07 ENCOUNTER — TELEPHONE (OUTPATIENT)
Dept: FAMILY MEDICINE | Facility: CLINIC | Age: 85
End: 2020-07-07

## 2020-07-07 NOTE — TELEPHONE ENCOUNTER
----- Message from Jayesh Mac sent at 7/7/2020 12:56 PM CDT -----  Type:  Patient Returning Call    Who Called:  Toya Cousin (Daughter)  Who Left Message for Patient: Yaquelin  Does the patient know what this is regarding?:  Test results  Best Call Back Number:  [phone]  Additional Information:        
Spoke to patient's daughter to deliver results. Patient's daughter demonstrated understanding.     
no

## 2020-07-12 ENCOUNTER — PATIENT OUTREACH (OUTPATIENT)
Dept: ADMINISTRATIVE | Facility: OTHER | Age: 85
End: 2020-07-12

## 2020-07-15 ENCOUNTER — OFFICE VISIT (OUTPATIENT)
Dept: GASTROENTEROLOGY | Facility: CLINIC | Age: 85
End: 2020-07-15
Payer: MEDICARE

## 2020-07-15 VITALS
HEIGHT: 64 IN | DIASTOLIC BLOOD PRESSURE: 82 MMHG | SYSTOLIC BLOOD PRESSURE: 195 MMHG | BODY MASS INDEX: 22.96 KG/M2 | WEIGHT: 134.5 LBS | HEART RATE: 79 BPM

## 2020-07-15 DIAGNOSIS — Z79.01 CHRONIC ANTICOAGULATION: ICD-10-CM

## 2020-07-15 DIAGNOSIS — K59.00 CONSTIPATION, UNSPECIFIED CONSTIPATION TYPE: Primary | ICD-10-CM

## 2020-07-15 PROCEDURE — 1126F AMNT PAIN NOTED NONE PRSNT: CPT | Mod: S$GLB,,, | Performed by: INTERNAL MEDICINE

## 2020-07-15 PROCEDURE — 1159F PR MEDICATION LIST DOCUMENTED IN MEDICAL RECORD: ICD-10-PCS | Mod: S$GLB,,, | Performed by: INTERNAL MEDICINE

## 2020-07-15 PROCEDURE — 99999 PR PBB SHADOW E&M-EST. PATIENT-LVL V: ICD-10-PCS | Mod: PBBFAC,,, | Performed by: INTERNAL MEDICINE

## 2020-07-15 PROCEDURE — 1101F PR PT FALLS ASSESS DOC 0-1 FALLS W/OUT INJ PAST YR: ICD-10-PCS | Mod: CPTII,S$GLB,, | Performed by: INTERNAL MEDICINE

## 2020-07-15 PROCEDURE — 1101F PT FALLS ASSESS-DOCD LE1/YR: CPT | Mod: CPTII,S$GLB,, | Performed by: INTERNAL MEDICINE

## 2020-07-15 PROCEDURE — 1159F MED LIST DOCD IN RCRD: CPT | Mod: S$GLB,,, | Performed by: INTERNAL MEDICINE

## 2020-07-15 PROCEDURE — 1126F PR PAIN SEVERITY QUANTIFIED, NO PAIN PRESENT: ICD-10-PCS | Mod: S$GLB,,, | Performed by: INTERNAL MEDICINE

## 2020-07-15 PROCEDURE — 99214 PR OFFICE/OUTPT VISIT, EST, LEVL IV, 30-39 MIN: ICD-10-PCS | Mod: S$GLB,,, | Performed by: INTERNAL MEDICINE

## 2020-07-15 PROCEDURE — 99999 PR PBB SHADOW E&M-EST. PATIENT-LVL V: CPT | Mod: PBBFAC,,, | Performed by: INTERNAL MEDICINE

## 2020-07-15 PROCEDURE — 99214 OFFICE O/P EST MOD 30 MIN: CPT | Mod: S$GLB,,, | Performed by: INTERNAL MEDICINE

## 2020-07-15 NOTE — PROGRESS NOTES
Subjective:       Patient ID: Blaire Cage is a 89 y.o. female.    This is an established patient.      Chief Complaint: Abdominal pain    PAST HISTORY:    Abdominal Pain  This is a new problem. The current episode started in the past 7 days. The onset quality is sudden. The problem occurs daily. Duration: variable. The problem has been gradually improving. The pain is located in the generalized abdominal region. The pain is at a severity of 5/10. The pain is moderate. The quality of the pain is aching and cramping. The abdominal pain does not radiate. Associated symptoms include constipation (resolved). Pertinent negatives include no arthralgias, diarrhea, dysuria, fever, hematuria, myalgias, nausea or vomiting. Nothing aggravates the pain. The pain is relieved by nothing. She has tried nothing for the symptoms. The treatment provided no relief. Prior diagnostic workup includes CT scan. Her past medical history is significant for GERD.   Follow-up  Associated symptoms include abdominal pain (resolved). Pertinent negatives include no arthralgias, chest pain, chills, coughing, fatigue, fever, myalgias, nausea, rash or vomiting.     Abdominal pain and diarrhea have completely resolved. She has no symptoms and is feeling well.  She did not submit stool sample as diarrhea ceased.  No other acute issues.    She relates that she has had recurrence of abdominal pain, generalized, associated with worse constipation than she typically has at baseline.  She also admits to some loose stool which sounds consistent with overflow diarrhea secondary to underlying chronic constipation.  Her daughter was present as well and states that she agrees.  Patient denies bleeding, weight loss, or UGI symptoms.  She has history of cystic pancreatic lesion which appeared stable on imaging in 02/2020.  She states that, given her advanced age, she would like to avoid any unnecessary procedures.    INTERVAL HISTORY:  Since last visit, she  "began Linzess and had good response.  Since that time, however, she states that she no longer needs it.  She is having daily BMs with no straining and is not currently needing Linzess.  She feels well and denies any other complaints.      Review of Systems   Constitutional: Negative for chills, fatigue and fever.   Respiratory: Negative for cough, shortness of breath and wheezing.    Cardiovascular: Negative for chest pain and palpitations.   Gastrointestinal: Positive for abdominal pain (resolved) and constipation (resolved). Negative for blood in stool, diarrhea, nausea and vomiting.   Genitourinary: Negative for dysuria and hematuria.   Musculoskeletal: Negative for arthralgias and myalgias.   Skin: Negative for color change and rash.   Psychiatric/Behavioral: The patient is not nervous/anxious.    All other systems reviewed and are negative.      Objective:       Vitals:    07/15/20 1528   BP: (!) 195/82   BP Location: Right arm   Patient Position: Sitting   Pulse: 79   Weight: 61 kg (134 lb 7.7 oz)   Height: 5' 4" (1.626 m)       Physical Exam  Vitals signs reviewed.   Constitutional:       Appearance: She is well-developed.      Comments: Thin elderly female who appears tired and mildly ill.   HENT:      Head: Normocephalic and atraumatic.   Eyes:      General: No scleral icterus.     Pupils: Pupils are equal, round, and reactive to light.   Cardiovascular:      Rate and Rhythm: Normal rate and regular rhythm.      Heart sounds: No murmur.   Pulmonary:      Effort: Pulmonary effort is normal.      Breath sounds: Normal breath sounds. No wheezing.   Abdominal:      General: Bowel sounds are normal. There is no distension.      Palpations: Abdomen is soft.      Tenderness: There is no abdominal tenderness.   Neurological:      Mental Status: She is alert and oriented to person, place, and time.           Further history was obtained from the patient's daughter who was present throughout the interview and states " that symptoms have completely resolved and she is eating well.  History is otherwise as above in the HPI.             Lab Results   Component Value Date    WBC 6.59 06/11/2020    HGB 9.1 (L) 06/11/2020    HCT 29.9 (L) 06/11/2020     (H) 06/11/2020     06/11/2020       CMP  Sodium   Date Value Ref Range Status   06/11/2020 138 136 - 145 mmol/L Final     Potassium   Date Value Ref Range Status   06/11/2020 4.7 3.5 - 5.1 mmol/L Final     Chloride   Date Value Ref Range Status   06/11/2020 107 95 - 110 mmol/L Final     CO2   Date Value Ref Range Status   06/11/2020 23 23 - 29 mmol/L Final     Glucose   Date Value Ref Range Status   06/11/2020 101 70 - 110 mg/dL Final     BUN, Bld   Date Value Ref Range Status   06/11/2020 62 (H) 8 - 23 mg/dL Final     Creatinine   Date Value Ref Range Status   06/11/2020 1.8 (H) 0.5 - 1.4 mg/dL Final   03/04/2013 1.1 0.5 - 1.4 mg/dL Final     Calcium   Date Value Ref Range Status   06/11/2020 9.2 8.7 - 10.5 mg/dL Final   03/04/2013 9.8 8.7 - 10.5 mg/dL Final     Total Protein   Date Value Ref Range Status   06/11/2020 6.5 6.0 - 8.4 g/dL Final     Albumin   Date Value Ref Range Status   06/11/2020 3.3 (L) 3.5 - 5.2 g/dL Final     Total Bilirubin   Date Value Ref Range Status   06/11/2020 0.2 0.1 - 1.0 mg/dL Final     Comment:     For infants and newborns, interpretation of results should be based  on gestational age, weight and in agreement with clinical  observations.  Premature Infant recommended reference ranges:  Up to 24 hours.............<8.0 mg/dL  Up to 48 hours............<12.0 mg/dL  3-5 days..................<15.0 mg/dL  6-29 days.................<15.0 mg/dL       Alkaline Phosphatase   Date Value Ref Range Status   06/11/2020 62 55 - 135 U/L Final   03/04/2013 60 55 - 135 U/L Final     AST   Date Value Ref Range Status   06/11/2020 24 10 - 40 U/L Final   03/04/2013 21 10 - 40 U/L Final     ALT   Date Value Ref Range Status   06/11/2020 16 10 - 44 U/L Final      Anion Gap   Date Value Ref Range Status   06/11/2020 8 8 - 16 mmol/L Final   03/04/2013 10 5 - 15 meq/L Final     eGFR if    Date Value Ref Range Status   06/11/2020 28.4 (A) >60 mL/min/1.73 m^2 Final     eGFR if non    Date Value Ref Range Status   06/11/2020 24.6 (A) >60 mL/min/1.73 m^2 Final     Comment:     Calculation used to obtain the estimated glomerular filtration  rate (eGFR) is the CKD-EPI equation.            Assessment:       1. Constipation, unspecified constipation type    2. Chronic anticoagulation        Plan:       1.  Continue current medications  2.  Hydrate  3.  OK to resume Linzess if needed but continue current regimen for now.  4.  No endoscopy recommended at this time given absence of alarm features and advanced age.  5.  Follow up in my office PRN.    .

## 2020-07-23 ENCOUNTER — PATIENT OUTREACH (OUTPATIENT)
Dept: ADMINISTRATIVE | Facility: OTHER | Age: 85
End: 2020-07-23

## 2020-07-23 NOTE — PROGRESS NOTES
Chart was reviewed for overdue [facility] Encounters (GER[facility])  topics  Updates were requested from care everywhere

## 2020-07-27 ENCOUNTER — CLINICAL SUPPORT (OUTPATIENT)
Dept: CARDIOLOGY | Facility: CLINIC | Age: 85
End: 2020-07-27
Payer: MEDICARE

## 2020-07-27 DIAGNOSIS — Z95.818 PRESENCE OF OTHER CARDIAC IMPLANTS AND GRAFTS: ICD-10-CM

## 2020-07-27 PROCEDURE — 93298 CARDIAC DEVICE CHECK - REMOTE: ICD-10-PCS | Mod: S$GLB,,, | Performed by: INTERNAL MEDICINE

## 2020-07-27 PROCEDURE — 93298 REM INTERROG DEV EVAL SCRMS: CPT | Mod: S$GLB,,, | Performed by: INTERNAL MEDICINE

## 2020-07-27 NOTE — PROGRESS NOTES
[facility] Hematology/Oncology  PROGRESS NOTE - Follow-up Visit        Subjective:       Patient ID:   NAME: Blaire Cage : 1930     89 y.o. female    Referring Doc: Sandro  Other Physicians: Cande Garg (PA); Eli Edmonds (PCP); Jeffrey Christopher (Pulm); Sandra (GI)    Chief Complaint:  DVT and PE f/u    History of Present Illness:     Patient is being seen today in person. She just turned 89 yo this past weekend.      The patient is on today to go over the results of the recently ordered labs, tests and studies. She is on with her daughter. She is breathing ok. She denies any swelling in the legs. She denies any CP, SOB, HA's. No excessive bleeding or bruising.      She has some left foot pain issues and plans to see the podiatrist in near future.  She has since seen ENT but has not followed up as of yet    Discussed COVID19 precautions       ROS:   GEN: normal without any fever, night sweats or weight loss  HEENT: normal with no HA's, sore throat, stiff neck, changes in vision  CV: normal with no CP, SOB, PND, WEST or orthopnea  PULM: normal with no SOB, cough, hemoptysis, sputum or pleuritic pain  GI: some recent N/V, and diarrhea  : normal with no hematuria, dysuria  BREAST: normal with no mass, discharge, pain  SKIN: normal with no rash, erythema, bruising, or swelling    Allergies:  Review of patient's allergies indicates:   Allergen Reactions    Carvedilol Other (See Comments)     Bradycardia and syncope    Boniva [ibandronate]     Codeine     Hydralazine analogues     Iodinated contrast- oral and iv dye Hives    Kionex [sodium polystyrene sulfonate] Diarrhea     From encounter 17 for diarrhea--not true allergy.    Lisinopril     Morphine        Medications:    Current Outpatient Medications:     acetaminophen (TYLENOL) 325 MG tablet, Take 2 tablets (650 mg total) by mouth every 4 (four) hours as needed. (Patient taking differently: Take by mouth every 4 (four) hours as needed for Pain. ),  Disp: , Rfl: 0    albuterol-ipratropium (DUO-NEB) 2.5 mg-0.5 mg/3 mL nebulizer solution, Take 3 mLs by nebulization every 6 (six) hours as needed for Wheezing. Rescue, Disp: 120 vial, Rfl: 11    amLODIPine (NORVASC) 5 MG tablet, Take 1 tablet (5 mg total) by mouth 2 (two) times daily., Disp: 180 tablet, Rfl: 3    apixaban (ELIQUIS) 5 mg Tab, Take 1 tablet (5 mg total) by mouth 2 (two) times daily., Disp: 180 tablet, Rfl: 3    ascorbic acid (VITAMIN C) 500 MG tablet, Take 500 mg by mouth once daily.  , Disp: , Rfl:     aspirin (ECOTRIN) 81 MG EC tablet, Take 81 mg by mouth once daily.  , Disp: , Rfl:     calcium carbonate (OS-TASIA) 500 mg calcium (1,250 mg) tablet, Take 1 tablet by mouth 2 (two) times daily.  , Disp: , Rfl:     cyanocobalamin/folic acid (FOLTRATE ORAL), vitamin B12-folic acid  2.5 - 25-2MG, Disp: , Rfl:     diclofenac sodium (VOLTAREN) 1 % Gel, Apply 2 g topically once daily., Disp: 100 g, Rfl: prn    docusate sodium (COLACE) 100 MG capsule, Take 1 capsule (100 mg total) by mouth 2 (two) times daily., Disp: 90 capsule, Rfl: 0    ferrous sulfate (FEOSOL) 325 mg (65 mg iron) Tab tablet, Take 1 tablet (325 mg total) by mouth 2 (two) times daily with meals., Disp: 180 tablet, Rfl: 3    fish oil-omega-3 fatty acids 300-1,000 mg capsule, Take 2 g by mouth every evening. , Disp: , Rfl:     fluocinolone (SYNALAR) 0.01 % external solution, Apply topically 2 (two) times daily., Disp: 90 mL, Rfl: 1    fluticasone (FLONASE) 50 mcg/actuation nasal spray, 2 sprays by Each Nare route once daily., Disp: 48 g, Rfl: 3    fluticasone-umeclidin-vilanter (TRELEGY ELLIPTA) 100-62.5-25 mcg DsDv, Inhale 1 puff into the lungs once daily., Disp: 60 each, Rfl: 11    folic acid-vit B6-vit B12 2.5-25-2 mg (FOLBIC) 2.5-25-2 mg Tab, Take 1 tablet by mouth once daily., Disp: 90 tablet, Rfl: 3    furosemide (LASIX) 20 MG tablet, Take 1 tablet only as needed, for swelling, increase SOB, weight gain of 3 lbs overnight  or 5 lbs for the week, Disp: 24 tablet, Rfl: 3    gabapentin (NEURONTIN) 300 MG capsule, Take 1 capsule (300 mg total) by mouth every evening., Disp: 90 capsule, Rfl: 3    glipiZIDE (GLUCOTROL) 2.5 MG TR24, Take 2.5 mg by mouth once daily., Disp: , Rfl:     levothyroxine (SYNTHROID) 88 MCG tablet, Take 88 mcg by mouth once daily., Disp: , Rfl:     lidocaine (LIDODERM) 5 %, Place 1 patch onto the skin once daily. Remove & Discard patch within 12 hours or as directed by MD, Disp: 3 patch, Rfl: 0    linaCLOtide (LINZESS) 72 mcg Cap capsule, Take 1 capsule (72 mcg total) by mouth once daily., Disp: 90 capsule, Rfl: 3    magnesium oxide (MAG-OX) 400 mg (241.3 mg magnesium) tablet, TAKE 1 TABLET BY MOUTH ONCE DAILY, Disp: 90 tablet, Rfl: 3    meclizine (ANTIVERT) 25 mg tablet, Take 1 tablet (25 mg total) by mouth 3 (three) times daily as needed., Disp: 20 tablet, Rfl: 0    montelukast (SINGULAIR) 10 mg tablet, TAKE 1 TABLET BY MOUTH ONCE DAILY IN THE EVENING, Disp: 90 tablet, Rfl: 1    multivitamin (THERAGRAN) tablet, Take 1 tablet by mouth once daily., Disp: , Rfl:     mupirocin (BACTROBAN) 2 % ointment, Apply topically 2 (two) times daily., Disp: 30 g, Rfl: 1    nitroGLYCERIN (NITROSTAT) 0.4 MG SL tablet, Place 1 tablet (0.4 mg total) under the tongue every 5 (five) minutes as needed for Chest pain. As needed (Patient taking differently: Place 0.4 mg under the tongue every 5 (five) minutes as needed for Chest pain. Seek medical help if pain is not relieved after the third dose.), Disp: 30 tablet, Rfl: 3    omeprazole (PRILOSEC) 40 MG capsule, Take 1 capsule (40 mg total) by mouth once daily., Disp: 30 capsule, Rfl: 11    sertraline (ZOLOFT) 25 MG tablet, Take 1 tablet (25 mg total) by mouth once daily., Disp: 90 tablet, Rfl: 3    simvastatin (ZOCOR) 40 MG tablet, TAKE 1 TABLET BY MOUTH ONCE DAILY IN THE EVENING, Disp: 90 tablet, Rfl: 3    traMADoL (ULTRAM) 50 mg tablet, Take 1 tablet (50 mg total) by  mouth every 8 (eight) hours as needed for Pain., Disp: 9 tablet, Rfl: 0    vitamin D 1000 units Tab, Take 1 tablet (1,000 Units total) by mouth once daily., Disp: 90 tablet, Rfl: 3    PMHx/PSHx Updates:  See patient's last visit with me on 5/12/2020  See H&P on 6/29/2018        Pathology:  Cancer Staging  No matching staging information was found for the patient.          Objective:     Vitals:  Blood pressure (!) 144/53, pulse 83, temperature 97 °F (36.1 °C), resp. rate 19, weight 61.2 kg (135 lb).        Physical Examination:   GEN: no apparent distress, comfortable; AAOx3  HEAD: atraumatic and normocephalic  EYES: no conjunctival pallor or muddiness, no icterus; normal pupil reaction to ambient light  ENT: OMM, no pharyngeal erythema, external bilateral ears WNL; no visible thrush or ulcers  NECK: no masses or swelling, trachea midline, no visible LAD/LN's   CV: no palpitations; no pedal edema; no noticeable JVD or neck vein distension  CHEST: Normal respiratory effort; chest wall breath movements symmetrical; no audible wheezing  ABDOM: non-distended; no bloating  MUSC/Skeletal: ROM normal; joints visibly normal; no deformities or arthropathy  EXTREM: no clubbing, cyanosis, inflammation or swelling  SKIN: no rashes, lesions, ulcers, petechiae or subcutaneous nodules; vitiligo  : no carter  NEURO: moving all 4 extremities; AAOx3; no tremors  PSYCH: normal mood, affect and behavior  LYMPH: no visible LN's or LAD  LN's             Labs:          Lab Results   Component Value Date    WBC 6.59 06/11/2020    HGB 9.1 (L) 06/11/2020    HCT 29.9 (L) 06/11/2020     (H) 06/11/2020     06/11/2020            BMP  Lab Results   Component Value Date     06/11/2020    K 4.7 06/11/2020     06/11/2020    CO2 23 06/11/2020    BUN 62 (H) 06/11/2020    CREATININE 1.8 (H) 06/11/2020    CALCIUM 9.2 06/11/2020    ANIONGAP 8 06/11/2020    ESTGFRAFRICA 28.4 (A) 06/11/2020    EGFRNONAA 24.6 (A) 06/11/2020         Lab Results   Component Value Date    IRON 102 06/11/2020    TIBC 337 06/11/2020    FERRITIN 156 06/11/2020     Lab Results   Component Value Date    IGBLZARQ38 >2000 (H) 05/07/2020     Lab Results   Component Value Date    FOLATE >40.0 (H) 05/07/2020           Radiology/Diagnostic Studies:    Ct Head Without Contrast    Result Date: 7/16/2018  EXAMINATION: CT HEAD WITHOUT CONTRAST CLINICAL HISTORY: Dizziness; TECHNIQUE: 5 mm noncontrast axial images were acquired through the head. COMPARISON: CT head without contrast-11/29/2014 FINDINGS: The brain is normally formed with preserved gray-white matter junction differentiation. No evidence of acute/recent major vascular territory cerebral infarction, parenchymal hemorrhage, or intra-axial mass.  There is age-appropriate cerebral volume loss with associated compensatory enlargement of the ventricular system and widening of the CSF spaces over both cerebral convexities.  There are confluent areas of periventricular white matter hypoattenuation compatible with age-appropriate chronic microvascular ischemic changes. No hydrocephalus.  No effacement of the skull-base cisterns.  No extra-axial fluid collections or blood products. The paranasal sinuses and mastoid air cells are clear.  There is rightward bony nasal septal deviation.  The visualized orbits are unremarkable.  The bony calvarium and visualized facial bones show no acute abnormality.     No acute intracranial abnormality appreciated.  No interval detrimental change in the imaging appearance of the brain when compared to the previous study. Electronically signed by: Aubrey Davis MD Date:    07/16/2018 Time:    08:01    Radiology Report    Result Date: 7/15/2018  Ordered by an unspecified provider.      I have reviewed all available lab results and radiology reports.    Assessment/Plan:   (1) 89 y.o. female with diagnosis of a recent LLE DVT and Pulmonary emboli who has been referred by Dr Jo with Neph for  evaluation by medical hematology/oncology. She was hospitalized [facility]. She has never had any clots before. No family hx/of clots.    - factor panel, protein C and S, ATIII adequate  - antiphosphatidyl negative; LA negative  - homocysteine elevated at 20.2  - MTHFR-C heterozygous positive - discussed the genetic implications with her and her daughter  - prothrombin II negative  - she is on eliquis bid      (2) COPD/asthma; former smoker     (3) Left ventricular dysfunction     (4) HTN and hypercholesterolemia     (5) CRI - stage III     (6) Anemia - most likely a multifactorial process with iron deficiency diagnosis, anemia of chronic disorders, anemia of chronic renal  - latest hgb is low  at  9.1 but better than last visit  - she is on iron and MVI  - iron panel and vitamins are adequate    (7) DM and hypothyroidism    (8) Prior hip fracture - left - needed pin placement only    (9) Vertigo - seen by ENT            Iron deficiency anemia due to chronic blood loss    Anemia, chronic renal failure, stage 3 (moderate)    Anemia, chronic disease    Anemia of chronic disease    Anemia due to multiple mechanisms    History of pulmonary embolism    Deep vein thrombosis (DVT) of proximal vein of left lower extremity, unspecified chronicity    Other acute pulmonary embolism without acute cor pulmonale    Chronic anticoagulation    Macrocytic anemia    Normocytic normochromic anemia    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 Continue Folbic for the hyperhomocysteinemia  2. Continue eliquis for now and we may need to discontinue it if her anemia does not stabilize  3. Previously  discussed the genetic implications of the MTHFR-C in siblings and children  4. F/u with PCP, GI, neph etc  5. monitor labs monthly for now (encouraged compliance)  6.  Recommend that nephrology consider starting her on procrit    RTC in  2-3 months  Fax note to Eli Edmonds,  MD; Reva Jo    Discussion:       Total Time spent on patient:    I spent over 15 mins of time with the patient. Reviewed results of the recently ordered labs, tests, reports and studies; made directives with regards to the results. Over half of this time was spent couseling and coordinating care, making treatment and analytical decisions; ordering necessary labs, tests and studies; and discussing treatment options and setting up treatment plan(s) if indicated.        COVID-19 Discussion:    I had long discussion with patient and any applicable family about the COVID-19 coronavirus epidemic and the recommended precautions with regard to cancer and/or hematology patients. I have re-iterated the CDC recommendations for adequate hand washing, use of hand -like products, and coughing into elbow, etc. In addition, especially for our patients who are on chemotherapy and/or our otherwise immunocompromised patients, I have recommended avoidance of crowds, including movie theaters, restaurants, churches, etc. I have recommended avoidance of any sick or symptomatic family members and/or friends. I have also recommended avoidance of any raw and unwashed food products, and general avoidance of food items that have not been prepared by themselves. The patient has been asked to call us immediately with any symptom developments, issues, questions or other general concerns.       Anticoagulation Discussion:    I had long discussion with patient and any applicable family members about the benefit and/or need for anticoagulation. I communicated about the risks of bleeding while on any anticoagulation, which could be serious and/or life-threatening, and which can occur at any time, regardless of degree of the level of anticoagulation. I expressed the need for compliance with any anticoagulation regimen and that failure to do so could potential lead to excessive bleeding, and risk to health and/or life. In  particular, with patients on coumadin therapy, compliance with requested blood work is absolutely essential, as coumadin levels can vary from time to time, and failure to do so could potentially place the patient at risk for bleeding and/or clotting events which could be fatal. Patients on coumadin are encouraged to call the day after they have their levels drawn, as to obtain the appropriate instructions from my staff. Patients are aware that self-regulating or self-dosing of their medications is strictly prohibited.       I have explained all of the above in detail and the patient understands all of the current recommendation(s). I have answered all of their questions to the best of my ability and to their complete satisfaction.   The patient is to continue with the current management plan.            Electronically signed by Issac Alvarez MD              Answers for HPI/ROS submitted by the patient on 7/25/2020   appetite change : No  unexpected weight change: No  visual disturbance: No  cough: No  shortness of breath: No  chest pain: No  abdominal pain: No  diarrhea: No  frequency: No  back pain: No  rash: No  headaches: No  adenopathy: No  nervous/ anxious: No

## 2020-07-28 ENCOUNTER — OFFICE VISIT (OUTPATIENT)
Dept: HEMATOLOGY/ONCOLOGY | Facility: CLINIC | Age: 85
End: 2020-07-28
Payer: MEDICARE

## 2020-07-28 VITALS
SYSTOLIC BLOOD PRESSURE: 144 MMHG | TEMPERATURE: 97 F | DIASTOLIC BLOOD PRESSURE: 53 MMHG | HEART RATE: 83 BPM | WEIGHT: 135 LBS | BODY MASS INDEX: 23.17 KG/M2 | RESPIRATION RATE: 19 BRPM

## 2020-07-28 DIAGNOSIS — D53.9 MACROCYTIC ANEMIA: ICD-10-CM

## 2020-07-28 DIAGNOSIS — D64.89 ANEMIA DUE TO MULTIPLE MECHANISMS: ICD-10-CM

## 2020-07-28 DIAGNOSIS — Z15.89 HETEROZYGOUS MTHFR MUTATION C677T: ICD-10-CM

## 2020-07-28 DIAGNOSIS — Z86.711 HISTORY OF PULMONARY EMBOLISM: ICD-10-CM

## 2020-07-28 DIAGNOSIS — D50.0 IRON DEFICIENCY ANEMIA DUE TO CHRONIC BLOOD LOSS: Primary | ICD-10-CM

## 2020-07-28 DIAGNOSIS — R79.83 HOMOCYSTEINEMIA: ICD-10-CM

## 2020-07-28 DIAGNOSIS — I26.99 OTHER ACUTE PULMONARY EMBOLISM WITHOUT ACUTE COR PULMONALE: ICD-10-CM

## 2020-07-28 DIAGNOSIS — Z79.01 CHRONIC ANTICOAGULATION: ICD-10-CM

## 2020-07-28 DIAGNOSIS — D64.9 NORMOCYTIC NORMOCHROMIC ANEMIA: ICD-10-CM

## 2020-07-28 DIAGNOSIS — F17.200 SMOKER: ICD-10-CM

## 2020-07-28 DIAGNOSIS — D63.1 ANEMIA, CHRONIC RENAL FAILURE, STAGE 3 (MODERATE): ICD-10-CM

## 2020-07-28 DIAGNOSIS — N18.30 ANEMIA, CHRONIC RENAL FAILURE, STAGE 3 (MODERATE): ICD-10-CM

## 2020-07-28 DIAGNOSIS — I82.4Y2 DEEP VEIN THROMBOSIS (DVT) OF PROXIMAL VEIN OF LEFT LOWER EXTREMITY, UNSPECIFIED CHRONICITY: ICD-10-CM

## 2020-07-28 DIAGNOSIS — D63.8 ANEMIA, CHRONIC DISEASE: ICD-10-CM

## 2020-07-28 DIAGNOSIS — D63.8 ANEMIA OF CHRONIC DISEASE: ICD-10-CM

## 2020-07-28 PROCEDURE — 99213 OFFICE O/P EST LOW 20 MIN: CPT | Mod: S$GLB,,, | Performed by: INTERNAL MEDICINE

## 2020-07-28 PROCEDURE — 1101F PR PT FALLS ASSESS DOC 0-1 FALLS W/OUT INJ PAST YR: ICD-10-PCS | Mod: S$GLB,,, | Performed by: INTERNAL MEDICINE

## 2020-07-28 PROCEDURE — 1159F MED LIST DOCD IN RCRD: CPT | Mod: S$GLB,,, | Performed by: INTERNAL MEDICINE

## 2020-07-28 PROCEDURE — 1125F AMNT PAIN NOTED PAIN PRSNT: CPT | Mod: S$GLB,,, | Performed by: INTERNAL MEDICINE

## 2020-07-28 PROCEDURE — 1159F PR MEDICATION LIST DOCUMENTED IN MEDICAL RECORD: ICD-10-PCS | Mod: S$GLB,,, | Performed by: INTERNAL MEDICINE

## 2020-07-28 PROCEDURE — 1125F PR PAIN SEVERITY QUANTIFIED, PAIN PRESENT: ICD-10-PCS | Mod: S$GLB,,, | Performed by: INTERNAL MEDICINE

## 2020-07-28 PROCEDURE — 99213 PR OFFICE/OUTPT VISIT, EST, LEVL III, 20-29 MIN: ICD-10-PCS | Mod: S$GLB,,, | Performed by: INTERNAL MEDICINE

## 2020-07-28 PROCEDURE — 1101F PT FALLS ASSESS-DOCD LE1/YR: CPT | Mod: S$GLB,,, | Performed by: INTERNAL MEDICINE

## 2020-08-03 ENCOUNTER — PATIENT OUTREACH (OUTPATIENT)
Dept: ADMINISTRATIVE | Facility: OTHER | Age: 85
End: 2020-08-03

## 2020-08-05 ENCOUNTER — LAB VISIT (OUTPATIENT)
Dept: LAB | Facility: HOSPITAL | Age: 85
End: 2020-08-05
Attending: INTERNAL MEDICINE
Payer: MEDICARE

## 2020-08-05 ENCOUNTER — OFFICE VISIT (OUTPATIENT)
Dept: PULMONOLOGY | Facility: CLINIC | Age: 85
End: 2020-08-05
Payer: MEDICARE

## 2020-08-05 VITALS
DIASTOLIC BLOOD PRESSURE: 65 MMHG | SYSTOLIC BLOOD PRESSURE: 140 MMHG | OXYGEN SATURATION: 97 % | BODY MASS INDEX: 22.86 KG/M2 | HEART RATE: 71 BPM | WEIGHT: 133.19 LBS

## 2020-08-05 DIAGNOSIS — N39.0 URINARY TRACT INFECTION, SITE NOT SPECIFIED: ICD-10-CM

## 2020-08-05 DIAGNOSIS — J44.1 COPD WITH ACUTE EXACERBATION: Primary | ICD-10-CM

## 2020-08-05 DIAGNOSIS — J96.11 CHRONIC RESPIRATORY FAILURE WITH HYPOXIA: ICD-10-CM

## 2020-08-05 DIAGNOSIS — N25.81 SECONDARY HYPERPARATHYROIDISM OF RENAL ORIGIN: ICD-10-CM

## 2020-08-05 DIAGNOSIS — E79.0 URICACIDEMIA: ICD-10-CM

## 2020-08-05 DIAGNOSIS — J44.89 ASTHMA-COPD OVERLAP SYNDROME: ICD-10-CM

## 2020-08-05 DIAGNOSIS — N18.4 CHRONIC KIDNEY DISEASE, STAGE IV (SEVERE): Primary | ICD-10-CM

## 2020-08-05 PROCEDURE — 81001 URINALYSIS AUTO W/SCOPE: CPT

## 2020-08-05 PROCEDURE — 83970 ASSAY OF PARATHORMONE: CPT

## 2020-08-05 PROCEDURE — 99499 RISK ADDL DX/OHS AUDIT: ICD-10-PCS | Mod: S$GLB,,, | Performed by: INTERNAL MEDICINE

## 2020-08-05 PROCEDURE — 84550 ASSAY OF BLOOD/URIC ACID: CPT

## 2020-08-05 PROCEDURE — 1126F PR PAIN SEVERITY QUANTIFIED, NO PAIN PRESENT: ICD-10-PCS | Mod: S$GLB,,, | Performed by: INTERNAL MEDICINE

## 2020-08-05 PROCEDURE — 36415 COLL VENOUS BLD VENIPUNCTURE: CPT | Mod: PO

## 2020-08-05 PROCEDURE — 99999 PR PBB SHADOW E&M-EST. PATIENT-LVL V: ICD-10-PCS | Mod: PBBFAC,,, | Performed by: INTERNAL MEDICINE

## 2020-08-05 PROCEDURE — 99499 UNLISTED E&M SERVICE: CPT | Mod: S$GLB,,, | Performed by: INTERNAL MEDICINE

## 2020-08-05 PROCEDURE — 1101F PT FALLS ASSESS-DOCD LE1/YR: CPT | Mod: CPTII,S$GLB,, | Performed by: INTERNAL MEDICINE

## 2020-08-05 PROCEDURE — 99999 PR PBB SHADOW E&M-EST. PATIENT-LVL V: CPT | Mod: PBBFAC,,, | Performed by: INTERNAL MEDICINE

## 2020-08-05 PROCEDURE — 1126F AMNT PAIN NOTED NONE PRSNT: CPT | Mod: S$GLB,,, | Performed by: INTERNAL MEDICINE

## 2020-08-05 PROCEDURE — 1159F MED LIST DOCD IN RCRD: CPT | Mod: S$GLB,,, | Performed by: INTERNAL MEDICINE

## 2020-08-05 PROCEDURE — 99214 OFFICE O/P EST MOD 30 MIN: CPT | Mod: S$GLB,,, | Performed by: INTERNAL MEDICINE

## 2020-08-05 PROCEDURE — 99214 PR OFFICE/OUTPT VISIT, EST, LEVL IV, 30-39 MIN: ICD-10-PCS | Mod: S$GLB,,, | Performed by: INTERNAL MEDICINE

## 2020-08-05 PROCEDURE — 85025 COMPLETE CBC W/AUTO DIFF WBC: CPT

## 2020-08-05 PROCEDURE — 80069 RENAL FUNCTION PANEL: CPT

## 2020-08-05 PROCEDURE — 1101F PR PT FALLS ASSESS DOC 0-1 FALLS W/OUT INJ PAST YR: ICD-10-PCS | Mod: CPTII,S$GLB,, | Performed by: INTERNAL MEDICINE

## 2020-08-05 PROCEDURE — 1159F PR MEDICATION LIST DOCUMENTED IN MEDICAL RECORD: ICD-10-PCS | Mod: S$GLB,,, | Performed by: INTERNAL MEDICINE

## 2020-08-05 PROCEDURE — 87086 URINE CULTURE/COLONY COUNT: CPT

## 2020-08-05 RX ORDER — OMEGA-3/DHA/EPA/FISH OIL 300-1000MG
CAPSULE,DELAYED RELEASE (ENTERIC COATED) ORAL
COMMUNITY
End: 2020-09-18 | Stop reason: CLARIF

## 2020-08-05 NOTE — PROGRESS NOTES
8/6/2020    Blaire Cage     Follow-up- previously seen by Dr. Christopher    Chief Complaint   Patient presents with    Follow-up     patient state she feels well       HPI:   08/05/2020- patient is a 89-year-old female with COPD, history of DVT/PE on Eliquis  Inhalers: trelegy, duo neb- uses 3x/day. Overall doing well without major complaints. Only WEST w/ activity but denies cough or phlegm.  Reports tongue is numb she thinks from trelegy. She does some activity in the house w/ ADLs but slows down and takes breaks when SOB. Mops the floor of one room then takes a break before starting next. When wakes up in the am she has some sinus congestion and clears throat. No exacerbations. Due to f/u with Dr. Herbert this week. Reports was getting some testing for carotid artery stenosis. Daughter is with her-  in [place]- and assists w/ pt. Pt lives with her.    1/21/2020-trelegy used but expensive, used prednisone once for flu- cleared nicely with 3 days.  No falls - uses roller walker.  Lives alone with visits from daughters daily. Sleeps with oxygen -     July 18,2019-fell with hip fx - had rehab then fell again 2 wks ago.  Uses prednisone once or twice?  No advair as not covered.  Cannot recall singulair  Patient Instructions   Use budesonide in nebulizer if covered. Use twice daily (once better than none- will prevent but not improve).  May need prednisone to keep lungs stable, should be better with regular use budesonide.     1/8/2019- no prednisone recently, has cough with mucous yellow to clear now- lungs worsened 2 wks ago and saw NP Anatoly and got celestone shot.    Discussed with patient above for education the following:    Get fluticasone - salmeterol inhaler and use 1 twice daily - if covered.  May prevent lungs from getting sick.  Use prednisone daily for 3 days if bronchitis flares.  If yellow mucous may need antibiotic.   July 2, 2018- had pe /dvt dx early June by v/q and u/s.  Took prednisone  "just once.    Discussed with patient above for education the following:    Use prednisone if needed. Lungs are doing well,   You had had foot swelling but no symptoms for 2 weeks- blood clot in leg went to lung.    Stay on blood thinners, usually 6 months minimum course, may be wise to stay on therapy?    Dec 20, 2017Smoked late life, no h/o asthma.  Lives alone with daughters /grandkids in and out.   No 02- off smokes a yr.  Has sinus problems chr with good flonase response.  Mucous is clear with no c/o excess cough and no wheezes.  Breathing felt to be good usually.  Uses resp rx bid.  No hosp for lungs- h/o  Fainting.  Has had very positive result from prednisone.    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 8/7/2018    Hip arthritis 3/1/2016    Homocysteinemia 8/7/2018    Hyperlipidemia     Hypertension     Normocytic normochromic anemia 6/29/2018    PE (pulmonary thromboembolism) 06/09/2018    Pneumonia of right lower lobe due to infectious organism 9/11/2017    Thyroid disease     hypothyroid    Type 2 diabetes mellitus with stage 3 chronic kidney disease 1/18/2013         Past Surgical History:   Procedure Laterality Date    APPENDECTOMY      CHOLECYSTECTOMY      FRACTURE SURGERY      right hip     HERNIA REPAIR      groin    HYSTERECTOMY      INSERTION OF IMPLANTABLE LOOP RECORDER N/A 12/12/2018    Procedure: Insertion, Implantable Loop Recorder;  Surgeon: " Ashok Herbert MD;  Location: [facility] CATH LAB;  Service: Cardiology;  Laterality: N/A;    PERCUTANEOUS PINNING OF HIP Left 3/23/2019    Procedure: PINNING, HIP, PERCUTANEOUS;  Surgeon: Jorje Hamlin MD;  Location: [facility] OR;  Service: Orthopedics;  Laterality: Left;    VARICOSE VEIN SURGERY       Social History     Tobacco Use    Smoking status: Former Smoker     Packs/day: 0.35     Years: 44.00     Pack years: 15.40     Types: Cigarettes     Quit date: 2015     Years since quittin.2    Smokeless tobacco: Never Used    Tobacco comment: 2015   Substance Use Topics    Alcohol use: No     Alcohol/week: 0.0 standard drinks     Frequency: Never     Binge frequency: Never    Drug use: No     Family History   Problem Relation Age of Onset    Hypertension Mother     Heart disease Mother     Lung disease Father     Diabetes Sister     Diabetes Brother     Kidney disease Son      Review of patient's allergies indicates:   Allergen Reactions    Carvedilol Other (See Comments)     Bradycardia and syncope    Boniva [ibandronate]     Codeine     Hydralazine analogues     Iodinated contrast- oral and iv dye Hives    Kionex [sodium polystyrene sulfonate]     Lisinopril     Morphine        Performance Status:The patient's activity level is functions out of house.      Review of Systems:  a review of eleven systems covering constitutional, Eye, HEENT, Psych, Respiratory, Cardiac, GI, , Musculoskeletal, Endocrine, Dermatologic was negative except for pertinent findings as listed ABOVE and below: All negative with pertinent positives as above       Exam:Comprehensive exam done. BP (!) 140/65 (BP Location: Right arm, Patient Position: Sitting)   Pulse 71   Wt 60.4 kg (133 lb 2.5 oz)   LMP  (LMP Unknown)   SpO2 97% Comment: on room air at rest  BMI 22.86 kg/m²   Exam included Vitals as listed, and patient's appearance and affect and alertness and mood, oral exam for yeast and hygiene and  pharynx lesions and Mallapatti (M) score, neck with inspection for jvd and masses and thyroid abnormalities and lymph nodes (supraclavicular and infraclavicular nodes and axillary also examined and noted if abn), chest exam included symmetry and effort and fremitus and percussion and auscultation, cardiac exam included rhythm and gallops and murmur and rubs and jvd and edema, abdominal exam for mass and hepatosplenomegaly and tenderness and hernias and bowel sounds, Musculoskeletal exam with muscle tone and posture and mobility/gait and  strength, and skin for rashes and cyanosis and pallor and turgor, extremity for clubbing.  Findings were normal except for pertinent findings listed below:  M1, thin  Diminished breath sounds w/ scattered wheezes  No clubbing or edema    Radiographs (ct chest and cxr) reviewed: results reviewed  -   CXR 5/5/20- chronic scarring of bibasilar pleura, tortuosity of trachea, lungs clear    Labs reviewed-      Results for SABRINA BARRIENTOS (MRN [id number]) as of 8/5/2020 15:06   Ref. Range 6/11/2020 12:30   WBC Latest Ref Range: 3.90 - 12.70 K/uL 6.59   RBC Latest Ref Range: 4.00 - 5.40 M/uL 2.91 (L)   Hemoglobin Latest Ref Range: 12.0 - 16.0 g/dL 9.1 (L)   Hematocrit Latest Ref Range: 37.0 - 48.5 % 29.9 (L)   MCV Latest Ref Range: 82 - 98 fL 103 (H)   MCH Latest Ref Range: 27.0 - 31.0 pg 31.3 (H)   MCHC Latest Ref Range: 32.0 - 36.0 g/dL 30.4 (L)   RDW Latest Ref Range: 11.5 - 14.5 % 13.9   Platelets Latest Ref Range: 150 - 350 K/uL 165       PFT results reviewed  10/30/2017- obstruction with positive bd response. Airflow improves but does not normalize      6MWT 10/2019- desaturation to 88% improves on 3 L O2    Plan:  Clinical impression is apparently straight forward and impression with management as below.    Sabrina was seen today for follow-up.    Diagnoses and all orders for this visit:    COPD with acute exacerbation    Chronic respiratory failure with hypoxia    Asthma-COPD  overlap syndrome        Follow up in about 6 months (around 2/5/2021).    Discussed with patient above for education the following:      Patient Instructions   Keep doing trelegy and nebulizer medicines  Follow up with dr. Herbert  Continue activity as you can  Get vaccines- flu shot in fall

## 2020-08-06 PROBLEM — J44.1 COPD WITH ACUTE EXACERBATION: Status: RESOLVED | Noted: 2017-10-14 | Resolved: 2020-08-06

## 2020-08-06 LAB
ALBUMIN SERPL BCP-MCNC: 3.7 G/DL (ref 3.5–5.2)
ANION GAP SERPL CALC-SCNC: 9 MMOL/L (ref 8–16)
BACTERIA #/AREA URNS AUTO: ABNORMAL /HPF
BASOPHILS # BLD AUTO: 0.03 K/UL (ref 0–0.2)
BASOPHILS NFR BLD: 0.5 % (ref 0–1.9)
BILIRUB UR QL STRIP: NEGATIVE
BUN SERPL-MCNC: 87 MG/DL (ref 8–23)
CALCIUM SERPL-MCNC: 9.5 MG/DL (ref 8.7–10.5)
CHLORIDE SERPL-SCNC: 108 MMOL/L (ref 95–110)
CLARITY UR REFRACT.AUTO: ABNORMAL
CO2 SERPL-SCNC: 26 MMOL/L (ref 23–29)
COLOR UR AUTO: YELLOW
CREAT SERPL-MCNC: 1.9 MG/DL (ref 0.5–1.4)
DIFFERENTIAL METHOD: ABNORMAL
EOSINOPHIL # BLD AUTO: 0.2 K/UL (ref 0–0.5)
EOSINOPHIL NFR BLD: 3.8 % (ref 0–8)
ERYTHROCYTE [DISTWIDTH] IN BLOOD BY AUTOMATED COUNT: 13.2 % (ref 11.5–14.5)
EST. GFR  (AFRICAN AMERICAN): 26.4 ML/MIN/1.73 M^2
EST. GFR  (NON AFRICAN AMERICAN): 22.9 ML/MIN/1.73 M^2
GLUCOSE SERPL-MCNC: 67 MG/DL (ref 70–110)
GLUCOSE UR QL STRIP: NEGATIVE
HCT VFR BLD AUTO: 29.5 % (ref 37–48.5)
HGB BLD-MCNC: 9.3 G/DL (ref 12–16)
HGB UR QL STRIP: NEGATIVE
HYALINE CASTS UR QL AUTO: 12 /LPF
IMM GRANULOCYTES # BLD AUTO: 0.02 K/UL (ref 0–0.04)
IMM GRANULOCYTES NFR BLD AUTO: 0.3 % (ref 0–0.5)
KETONES UR QL STRIP: NEGATIVE
LEUKOCYTE ESTERASE UR QL STRIP: ABNORMAL
LYMPHOCYTES # BLD AUTO: 1.7 K/UL (ref 1–4.8)
LYMPHOCYTES NFR BLD: 27 % (ref 18–48)
MCH RBC QN AUTO: 32.3 PG (ref 27–31)
MCHC RBC AUTO-ENTMCNC: 31.5 G/DL (ref 32–36)
MCV RBC AUTO: 102 FL (ref 82–98)
MICROSCOPIC COMMENT: ABNORMAL
MONOCYTES # BLD AUTO: 0.8 K/UL (ref 0.3–1)
MONOCYTES NFR BLD: 11.9 % (ref 4–15)
NEUTROPHILS # BLD AUTO: 3.6 K/UL (ref 1.8–7.7)
NEUTROPHILS NFR BLD: 56.5 % (ref 38–73)
NITRITE UR QL STRIP: NEGATIVE
NRBC BLD-RTO: 0 /100 WBC
PH UR STRIP: 5 [PH] (ref 5–8)
PHOSPHATE SERPL-MCNC: 4.6 MG/DL (ref 2.7–4.5)
PLATELET # BLD AUTO: 196 K/UL (ref 150–350)
PMV BLD AUTO: 11.1 FL (ref 9.2–12.9)
POTASSIUM SERPL-SCNC: 4.5 MMOL/L (ref 3.5–5.1)
PROT UR QL STRIP: ABNORMAL
PTH-INTACT SERPL-MCNC: 65 PG/ML (ref 9–77)
RBC # BLD AUTO: 2.88 M/UL (ref 4–5.4)
RBC #/AREA URNS AUTO: 1 /HPF (ref 0–4)
SODIUM SERPL-SCNC: 143 MMOL/L (ref 136–145)
SP GR UR STRIP: 1.01 (ref 1–1.03)
SQUAMOUS #/AREA URNS AUTO: 1 /HPF
URATE SERPL-MCNC: 8.6 MG/DL (ref 2.4–5.7)
URN SPEC COLLECT METH UR: ABNORMAL
WBC # BLD AUTO: 6.36 K/UL (ref 3.9–12.7)
WBC #/AREA URNS AUTO: 53 /HPF (ref 0–5)
WBC CLUMPS UR QL AUTO: ABNORMAL

## 2020-08-07 LAB
BACTERIA UR CULT: NORMAL
BACTERIA UR CULT: NORMAL

## 2020-08-14 ENCOUNTER — PATIENT OUTREACH (OUTPATIENT)
Dept: ADMINISTRATIVE | Facility: OTHER | Age: 85
End: 2020-08-14

## 2020-08-14 NOTE — PROGRESS NOTES
Chart was reviewed for overdue Proactive [facility] Encounters (GERRI)  topics  Updates were requested from care everywhere  Health Maintenance has been updated  LIN[place] immunization registry triggered

## 2020-08-18 ENCOUNTER — OFFICE VISIT (OUTPATIENT)
Dept: CARDIOLOGY | Facility: CLINIC | Age: 85
End: 2020-08-18
Payer: MEDICARE

## 2020-08-18 VITALS
BODY MASS INDEX: 22.32 KG/M2 | DIASTOLIC BLOOD PRESSURE: 70 MMHG | SYSTOLIC BLOOD PRESSURE: 159 MMHG | OXYGEN SATURATION: 93 % | HEIGHT: 64 IN | HEART RATE: 63 BPM | WEIGHT: 130.75 LBS

## 2020-08-18 DIAGNOSIS — I27.20 PULMONARY HYPERTENSION: ICD-10-CM

## 2020-08-18 DIAGNOSIS — R79.89 PRERENAL AZOTEMIA: ICD-10-CM

## 2020-08-18 DIAGNOSIS — E11.22 TYPE 2 DIABETES MELLITUS WITH STAGE 4 CHRONIC KIDNEY DISEASE, WITHOUT LONG-TERM CURRENT USE OF INSULIN: ICD-10-CM

## 2020-08-18 DIAGNOSIS — I51.89 DIASTOLIC DYSFUNCTION: ICD-10-CM

## 2020-08-18 DIAGNOSIS — Z95.818 STATUS POST PLACEMENT OF IMPLANTABLE LOOP RECORDER: ICD-10-CM

## 2020-08-18 DIAGNOSIS — R55 SYNCOPE, UNSPECIFIED SYNCOPE TYPE: ICD-10-CM

## 2020-08-18 DIAGNOSIS — D63.8 ANEMIA, CHRONIC DISEASE: ICD-10-CM

## 2020-08-18 DIAGNOSIS — R42 VERTIGO: ICD-10-CM

## 2020-08-18 DIAGNOSIS — N18.4 TYPE 2 DIABETES MELLITUS WITH STAGE 4 CHRONIC KIDNEY DISEASE, WITHOUT LONG-TERM CURRENT USE OF INSULIN: ICD-10-CM

## 2020-08-18 DIAGNOSIS — N18.30 ANEMIA, CHRONIC RENAL FAILURE, STAGE 3 (MODERATE): ICD-10-CM

## 2020-08-18 DIAGNOSIS — D63.1 ANEMIA, CHRONIC RENAL FAILURE, STAGE 3 (MODERATE): ICD-10-CM

## 2020-08-18 DIAGNOSIS — E65 ABDOMINAL OBESITY: Primary | ICD-10-CM

## 2020-08-18 DIAGNOSIS — N18.4 CKD (CHRONIC KIDNEY DISEASE), STAGE IV: ICD-10-CM

## 2020-08-18 DIAGNOSIS — F17.200 SMOKER: ICD-10-CM

## 2020-08-18 DIAGNOSIS — Z79.01 CHRONIC ANTICOAGULATION: ICD-10-CM

## 2020-08-18 PROCEDURE — 99999 PR PBB SHADOW E&M-EST. PATIENT-LVL III: CPT | Mod: PBBFAC,,, | Performed by: INTERNAL MEDICINE

## 2020-08-18 PROCEDURE — 99215 OFFICE O/P EST HI 40 MIN: CPT | Mod: S$GLB,,, | Performed by: INTERNAL MEDICINE

## 2020-08-18 PROCEDURE — 1101F PT FALLS ASSESS-DOCD LE1/YR: CPT | Mod: CPTII,S$GLB,, | Performed by: INTERNAL MEDICINE

## 2020-08-18 PROCEDURE — 1101F PR PT FALLS ASSESS DOC 0-1 FALLS W/OUT INJ PAST YR: ICD-10-PCS | Mod: CPTII,S$GLB,, | Performed by: INTERNAL MEDICINE

## 2020-08-18 PROCEDURE — 1159F MED LIST DOCD IN RCRD: CPT | Mod: S$GLB,,, | Performed by: INTERNAL MEDICINE

## 2020-08-18 PROCEDURE — 1126F AMNT PAIN NOTED NONE PRSNT: CPT | Mod: S$GLB,,, | Performed by: INTERNAL MEDICINE

## 2020-08-18 PROCEDURE — 1126F PR PAIN SEVERITY QUANTIFIED, NO PAIN PRESENT: ICD-10-PCS | Mod: S$GLB,,, | Performed by: INTERNAL MEDICINE

## 2020-08-18 PROCEDURE — 99215 PR OFFICE/OUTPT VISIT, EST, LEVL V, 40-54 MIN: ICD-10-PCS | Mod: S$GLB,,, | Performed by: INTERNAL MEDICINE

## 2020-08-18 PROCEDURE — 99999 PR PBB SHADOW E&M-EST. PATIENT-LVL III: ICD-10-PCS | Mod: PBBFAC,,, | Performed by: INTERNAL MEDICINE

## 2020-08-18 PROCEDURE — 1159F PR MEDICATION LIST DOCUMENTED IN MEDICAL RECORD: ICD-10-PCS | Mod: S$GLB,,, | Performed by: INTERNAL MEDICINE

## 2020-08-18 RX ORDER — FUROSEMIDE 40 MG/1
40 TABLET ORAL DAILY
Status: ON HOLD | COMMUNITY
Start: 2020-06-29 | End: 2020-10-29 | Stop reason: HOSPADM

## 2020-08-18 NOTE — PROGRESS NOTES
" Patient ID:  Blaire Cage is a 89 y.o. female who presents for {Misc; evaluation/follow-up:26087::"follow-up"} of No chief complaint on file.      Health literacy: {DESC; LOW/MEDIUM/HIGH:46898} medium  Activities: walk with walker  Nicotine: quit   Alcohol: none   Illicit drugs: none  Cardiac symptoms: none  Home BP: yes  Medication compliance: yes  Diet: regular, diabetic, Mediterranean Low K+ diet  Caffeine: none  Labs:   Last Echo: 2020  Last stress test: 2016  Cardiovascular angiogram:   EC2020   Fundoscopic exam:     No flowsheet data found.      HPI    ROS     Objective:    Physical Exam      Assessment:       No diagnosis found.     Plan:         There are no diagnoses linked to this encounter.              "

## 2020-08-18 NOTE — PROGRESS NOTES
Subjective:    Patient ID:  Blaire Cage is a 89 y.o. female who presents for evaluation of 6 month f/u  For HTN, controlled T2DM on glipizide (Glucotrol), CKD stage IV, vertigo with recurrent syncope, no Life Alert.  PCP: Dr. Edmonds see every 3-4 months  Hematologist: Dr. Alvarez  GI: Dr. Flores, considering a colonoscopy  Renal: Dr. Jo  Prior cardiologist: Dr. Fisher, last seen 10/2015, Dr. 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  Activities: very active around the house, do own housework, walking with a rolling walker, do not feel limited  Nicotine: quit 4/2015, 15 py  Alcohol: no  Illicit drugs: no  Cardiac symptoms: none  Home BP: 120 to 130/68  Medication compliance: yes  Diet: regular, Low K+ diet  Caffeine: 1 a week  Labs: 5/2017 LDL 78.2, 8/2019, CMP (BUN 46, Cr 1.8, eGFR 24.6), CBC (Hgb 9.2), iron sat 16%, ferritin 261  Lab Results   Component Value Date    TSH 0.888 05/08/2020        Lab Results   Component Value Date    HGBA1C 5.7 (H) 05/08/2020       Lab Results   Component Value Date    WBC 6.18 02/05/2020    HGB 9.1 (L) 02/05/2020    HCT 30.7 (L) 02/05/2020     (H) 02/05/2020     02/05/2020       CMP  Sodium   Date Value Ref Range Status   08/05/2020 143 136 - 145 mmol/L Final     Potassium   Date Value Ref Range Status   08/05/2020 4.5 3.5 - 5.1 mmol/L Final     Chloride   Date Value Ref Range Status   08/05/2020 108 95 - 110 mmol/L Final     CO2   Date Value Ref Range Status   08/05/2020 26 23 - 29 mmol/L Final     Glucose    Date Value Ref Range Status   08/05/2020 67 (L) 70 - 110 mg/dL Final     BUN, Bld   Date Value Ref Range Status   08/05/2020 87 (H) 8 - 23 mg/dL Final     Creatinine   Date Value Ref Range Status   08/05/2020 1.9 (H) 0.5 - 1.4 mg/dL Final   03/04/2013 1.1 0.5 - 1.4 mg/dL Final     Calcium   Date Value Ref Range Status   08/05/2020 9.5 8.7 - 10.5 mg/dL Final   03/04/2013 9.8 8.7 - 10.5 mg/dL Final     Total Protein   Date Value Ref Range Status   06/11/2020 6.5 6.0 - 8.4 g/dL Final     Albumin   Date Value Ref Range Status   08/05/2020 3.7 3.5 - 5.2 g/dL Final     Total Bilirubin   Date Value Ref Range Status   06/11/2020 0.2 0.1 - 1.0 mg/dL Final     Comment:     For infants and newborns, interpretation of results should be based  on gestational age, weight and in agreement with clinical  observations.  Premature Infant recommended reference ranges:  Up to 24 hours.............<8.0 mg/dL  Up to 48 hours............<12.0 mg/dL  3-5 days..................<15.0 mg/dL  6-29 days.................<15.0 mg/dL       Alkaline Phosphatase   Date Value Ref Range Status   06/11/2020 62 55 - 135 U/L Final   03/04/2013 60 55 - 135 U/L Final     AST   Date Value Ref Range Status   06/11/2020 24 10 - 40 U/L Final   03/04/2013 21 10 - 40 U/L Final     ALT   Date Value Ref Range Status   06/11/2020 16 10 - 44 U/L Final     Anion Gap   Date Value Ref Range Status   08/05/2020 9 8 - 16 mmol/L Final   03/04/2013 10 5 - 15 meq/L Final     eGFR if    Date Value Ref Range Status   08/05/2020 26.4 (A) >60 mL/min/1.73 m^2 Final     eGFR if non    Date Value Ref Range Status   08/05/2020 22.9 (A) >60 mL/min/1.73 m^2 Final     Comment:     Calculation used to obtain the estimated glomerular filtration  rate (eGFR) is the CKD-EPI equation.        @labrcntip(troponini)@  BNP  @LABRCNTIP(BNP,BNPTRIAGEBLO)@    Lab Results   Component Value Date    CHOL 154 05/09/2020    CHOL 186 12/14/2019    CHOL 149 05/23/2017  "    Lab Results   Component Value Date    HDL 54 2020    HDL 58 2019    HDL 52 2017     Lab Results   Component Value Date    LDLCALC 83.0 2020    LDLCALC 105.0 2019    LDLCALC 78.2 2017     Lab Results   Component Value Date    TRIG 85 2020    TRIG 115 2019    TRIG 94 2017     Lab Results   Component Value Date    CHOLHDL 35.1 2020    CHOLHDL 31.2 2019    CHOLHDL 34.9 2017      Last Echo: 2020  Last stress test: 2016  Cardiovascular angiogram: none  EC2020 NSR, normal, 63 bpm  Fundoscopic exam: usually yearly, no retinopathy    In 10/2016:  DCS - "85 y.o. female with PMH of COPD, hyperlipidemia, hypertension, hypothyroidism, GERD and arthritis presents to the ED for evaluation of lightheadedness "room spinning" with Nausea and vomiting. She states she was getting ready for [employer] and was standing at counter when she began to have "spinning, so I sat down in chair and started vomiting." She reports near syncope while talking with family on phone. Denies chest pain and shortness of breath. She is asymptomatic at this time. Patient noted to have bradycardia on telemetry.    Hospital Course (synopsis of major diagnoses, care, treatment, and services provided during the course of the hospital stay): Patient with bradycardia. Negative orthostatics. Patiens HR improved changing metoprolol to coreg. She was noted to have hypotension which improved with gentle hydration. Patient feeling better without complaints. Denies further N/V since admittance. Will decreased norvasc to 5 mg daily."    Old record reviewed, had Echo in 10/2015 - CONCLUSIONS     1 - Concentric hypertrophy. LV wall thickness is abnormal, with the septum and the posterior wall each measuring 1.3 cm across.    2 - Normal left ventricular systolic function (EF 60-65%).     3 - Left ventricular diastolic dysfunction. E/e'(lat) is 31    4 - Normal right ventricular systolic " "function .     5 - Pulmonary hypertension. The estimated PA systolic pressure is 47 mmHg.     6 - Trivial to mild aortic stenosis, JOHN = 1.72 cm2, peak velocity = 1.75 m/s, mean gradient = 7.0 mmHg.     7 - Mild mitral regurgitation.     8 - Mild tricuspid regurgitation.     Event monitor, 30 days - TEST DESCRIPTION   8 episodes of "heart racing" - in NSR with PAC, rate 59 to 113 bpm.  5 episodes of "lightheaded" - in NSR with PAC, rate 58 to 84 bpm.  3 episodes of "chest pain" - NSR with PAC, rate 67 to 88 bpm. Without ST abnormalities.  4 episodes of SOB - NSR with PAC, rate 67 to 103 bpm.  1 episode of isolated "skipped beat" - NSR with PAC, rate 76 bpm.  Max heart rate 130, minimum HR of 50, and average HR of 72 bpm.    CONCLUSIONS   Multiple symptoms reported, frequent PACs noted, rare PVC detected.   Chest pain noted without ST changes.    Since DC occasional WEST, no CP, no dizziness. Quit smoking for past 3 weeks. Lots of CV risk factors. Last lipid panel in 8/2015, LDL 94, non-. Last A1C 6.3% in 6/2016.    In 10/2016, here post hospital visit. DCS - "admitted for syncope and symptomatic bradycardia. She was on a BB at admission which was stopped. She had no further symptoms and was stable throughout her admission. Patient was seen by cardiology and felt not to be a candidate for PPM placement. She was set up with an event monitor at time of discharge and F/U with cardiology."  Chart reviewed had bradycardia with rate down to 37 on Coreg 3.125 mg bid.  Reviewed by Dr. Driscoll - "IMPRESSION:  1. Syncopal episode could be due to drug-induced i.e., Coreg, possible    vasovagal.  2. Hypertension.  3. History of COPD.     RECOMMENDATIONS:  1. From cardiac standpoint, we would recommend to check for orthostatic    changes.  2. Event monitor recorder to rule out any arrhythmias. The patient needs to    follow up with Dr. Herbert.  3. To avoid heart rate slowing drugs, recommend to take off the Coreg and use  "   drugs, which do not slow the heart rate.    DSE 6/2016 - EKG Conclusions:    1. The EKG portion of this study is negative for ischemia at a peak heart rate of 125 bpm (95% of predicted).   2. Blood pressure remained stable throughout the protocol  (Presenting BP: 147/87 Peak BP: 205/64).   3. The following arrhythmias were present: PVCs.   4. There were no symptoms of chest discomfort or significant dyspnea throughout the protocol.     ECHO CONCLUSIONS     1 - Concentric remodeling. the septum measuring 1.2 cm and the posterior wall measuring 1.3 cm across.    2 - Hyperdynamic left ventricular systolic function (EF 65-70%).     3 - Left ventricular diastolic dysfunction. E/e'(lat) is 19    4 - Normal right ventricular systolic function .     5 - Pulmonary hypertension. The estimated PA systolic pressure is 47 mmHg.     6 - Small pericardial effusion.     7 - No siginificant change from Echo in 10/2015.     No evidence of stress induced myocardial ischemia.     At home have good and bad (tired) days. Stay active, do own housework. Walking around the yard without problems. ECG shows NSR, PAC rate 73. Orthostatic VS shows no significant drop in BP and HR 72-75 bpm. Event monitor in place. No further near-syncope.     In 11/2016, no new problem. Home BP log reviewed 102-188/50-80, HR 60-85, on amlodipine 10 mg daily, 42% of readings with SBP > 150, mostly in the afternoon where 43 out of 55 readings with SBP >150. Problem with Coreg as noted. The only time with symptoms is when the BP readings are elevated. Have significant CKD stage III, eGFR 39 with marked anemia, Hgb 10.6 with iron deficiency, iron sat only 12% and ferritin at 42. On iron supplement bid.     In 12/2016, had recurrent syncope in October and November 2016. Noted constipation with the iron supplement, at times no BM for 3 days. Labs reviewed eGFR 31, have pre-renal azotemia BUN/Cr 34/1.7, Hgb 10.6. Report drinking about 80 oz daily, IOM recommendation  "for women is 91 oz daily.  ED note 11/11/2016 - "86 y.o. female with a pmhx of Anticoagulant long-term use; COPD; Diabetes mellitus type II; ; Hyperlipidemia; Hypertension; and Thyroid disease presenting to the E.D. with generalized weakness. Pt states she had a large bowel movement after being constipated recently and subsequently became dizzy upon standing up from the commode. She has had similar episodes of dizziness upon positional changes. Denies fever, chest pain, blood in stool, HA, visual changes, numbness, specific weakness. Past Surgical History: hysterectomy, appendectomy.     86 y.o. female presenting with episode of syncope preceded by positional change with onset of symptoms. Workup was undertaken based on patient's age. She is asymptomatic since arrival. She was given 500 cc normal saline solution IV. Upon standing following fluids, she feels well with no significant orthostasis or difficulty walking. Very low suspicion for ACS and I do not think cardiac biomarker is indicated. EKG reviewed with no sign of acute ischemic process or arrhythmia. No arrhythmia evident here on monitor. Per medical record reviewed with 30 day event monitor results showing no sign of significant arrhythmia. I did speak with her cardiologist Dr. Herbert who agreed with discharge and outpatient follow-up if patient is doing well with ambulation and no significant orthostatic symptoms. Patient has baseline renal insufficiency with creatinine similar to prior. She has a stable anemia. Low suspicion for other emergent process. Very low suspicion for emergent intracranial process. I had discussed extensively with family and patient has significant family support. They are comfortable taking her home. Outpatient PCP or cardiology follow-up recommended next week. Detailed return precautions reviewed."    In 2/2017, here post ED visit for nausea, weakness, tachycardia, and dehydration because did not drink 100 oz of fluid, improved with " "IVF. Since ED remain active, no problem. Home /70. Worry about walking due to possible fall after being tripped by a dog, 17 years ago.     In 12/2018 here for post hospital review:  DCS - "88 y.o. female with DM type 2, Hypertension, diastolic HF, COPD, prior PE on Apixaban, and recurrent syncope who presents to the ED for evaluation of syncope that occurred this morning.  She states she was sitting at the table and "just didn't feel good with lightheadedness" and passed out associated with nausea and vomiting x 1.  According to her daughter, the patient was unconscious for approximately 5 minutes, came to and then became unconscious a second time while on the phone with EMS.  Patient states she does not recall passing out. The patient/daughter deny head trauma, difficulty urinating or any other symptoms at this time. Patient reports "I have been having fainting my whole life, especially when I listening or talking about sad things."  Patient has history of recurrent syncopal events in the past and is followed by cardiology. Patient was evaluated in the ER where her orthostatic vitals in ED were positive. Patient was placed in the hospital for further evaluation and treatment.      * No surgery found *       Hospital Course:   Patient monitored closely during observation stay. She was found to have significant orthostatic hypotension. Home BP medications  and lasix were placed on hold.  She received IVF for hydration. Cardiology was consulted. MRI of the brain without contrast showed there was no acute abnormality. There was generalized volume loss and nonspecific white matter change.  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 There was no hemorrhage, mass effect or acute infarctionobtained and no acute process demonstrated. PT and OT were consulted for debility. There were concerns patient lived at home alone on OAC with history of recurrent syncope noted. These " "concerns were discussed with patient along with possible option for placement. Patient reported she wanted to return living back to her home and that her children looked in on her often. She did agree to Home Health. It was discussed with patient that she should get a Life Alert System or something equivalent to that for home use and she said she would discuss that with her children. Patient was noted to have improvement in her condition. Her SHALINI resolved. Her orthostatics later were no longer positive. Patient had no signs of syncope or presyncope and she was discharged to home once cleared by Cardiology. Her home Norvasc and lasix doses were reduced. Home Health was ordered for RN, PT, OT evaluate and treat."    My note - reviewed history of lifelong recurrent syncope which makes vaso-vagal the most likely etiology, at her age she is at risk for arrhythmic issues also. ILR is a reasonable option. She have colonoscopy schedule this Monday, she will need to be monitored closely. ILR can be done as OP when she decide to proceed.     In 8/2019, here for follow up, no heart worries. ILR reviewed, no significant arrhythmia, noted tachycardia due to T-wave oversensing. Multiple falls x 2 and broke left hip in 3/2019, return to ED after missing the chair on trying to sit.   Echo 4/2018  Left ventricle ejection fraction is normal.  Tricuspid valve shows mild regurgitation.  Left ventricular diastolic filling is abnormal.  Left atrium is moderately dilated.  Left ventricle shows moderate concentric hypertrophy.  Mitral Valve: The following structural abnormalities were observed: non-specific thickening. There is mild valve leaflet thickening. There is mild valve leaflet calcification. There is moderate mitral annular calcification. Mild stenosis.    In 2/2020 here for 6-months review. No heart worries. Remain active with housework and cooking, no problem    HPI comments: in 8/2020, 6 - months review, report syncope about 4 " months ago with vertigo, Antivert report helpful. ILR showed only Tachy event on 06/18/20, median vrate 158bpm, 6sec in duration, EGM  illustrates SR w/T-wave oversensing, not a true tachy event, true median vrate 78bpm.   Echo 5/2020 - Concentric left ventricular hypertrophy.  The mean diastolic gradient across the mitral valve is 5 mmHg at a heart rate of 57 bpm.  Normal left ventricular systolic function. The estimated ejection fraction is 65%.  Grade II (moderate) left ventricular diastolic dysfunction consistent with pseudonormalization.  Normal right ventricular systolic function.  Severe left atrial enlargement.  Mild right atrial enlargement.  Mild mitral regurgitation.  Mild to moderate tricuspid regurgitation.  Normal central venous pressure (3 mmHg).  The estimated PA systolic pressure is 50 mmHg.  Pulmonary hypertension present.  No PFO on color flow or saline bubble study      Review of Systems   Constitution: Positive for diaphoresis, night sweats and weight gain (8 lbs since 2/2020). Negative for fever and malaise/fatigue.   HENT: Positive for hoarse voice and odynophagia. Negative for nosebleeds and tinnitus.    Eyes: Negative for visual disturbance.   Cardiovascular: Positive for syncope (with vertigo). Negative for chest pain, claudication, cyanosis, dyspnea on exertion, irregular heartbeat, leg swelling, near-syncope, orthopnea, palpitations and paroxysmal nocturnal dyspnea.   Respiratory: Positive for wheezing. Negative for cough, shortness of breath, sleep disturbances due to breathing and snoring.         [place] score 2 to 4   Endocrine: Positive for cold intolerance and polyuria. Negative for polydipsia.   Hematologic/Lymphatic: Bruises/bleeds easily.   Skin: Positive for dry skin and poor wound healing. Negative for color change, flushing, nail changes and suspicious lesions.   Musculoskeletal: Positive for arthritis, back pain, falls (found self on floor), gout, joint pain and muscle  "cramps. Negative for joint swelling, muscle weakness and myalgias.   Gastrointestinal: Positive for change in bowel habit, constipation, flatus and melena (from iron supplement). Negative for heartburn, hematemesis, hematochezia and nausea.   Genitourinary: Positive for bladder incontinence, frequency and nocturia.   Neurological: Positive for vertigo. Negative for disturbances in coordination, excessive daytime sleepiness, dizziness, focal weakness, headaches, light-headedness, loss of balance, numbness and weakness.   Psychiatric/Behavioral: Positive for depression (lost son 3/21/2016, post heart surgery). Negative for substance abuse. The patient does not have insomnia and is not nervous/anxious.             Objective:    Physical Exam   Constitutional: She is oriented to person, place, and time. She appears well-developed and well-nourished.   HENT:   Head: Normocephalic.   Eyes: Pupils are equal, round, and reactive to light. Conjunctivae and EOM are normal.   Neck: Normal range of motion. Neck supple. No JVD present. No thyromegaly present.   Circumference 15.25"   Cardiovascular: Normal rate, regular rhythm and intact distal pulses. Exam reveals distant heart sounds. Exam reveals no gallop and no friction rub.   Murmur heard.   Medium-pitched machinery early systolic murmur is present with a grade of 2/6 at the upper right sternal border and upper left sternal border. Varicose veins  Pulses:       Dorsalis pedis pulses are 1+ on the right side and 1+ on the left side.        Posterior tibial pulses are 1+ on the right side and 1+ on the left side.   Superficial varicose veins in both LE.   Pulmonary/Chest: Effort normal and breath sounds normal. She has no rales. She exhibits no tenderness.   Diminished breath sounds   Abdominal: Soft. Bowel sounds are normal. There is no abdominal tenderness.   Waist 36" down to 35.5", hip 40.5"   Musculoskeletal: Normal range of motion.         General: No edema. "   Lymphadenopathy:     She has no cervical adenopathy.   Neurological: She is alert and oriented to person, place, and time.   Skin: Skin is warm and dry. No rash noted.   Poor turgor           Assessment:       1. Abdominal obesity    2. Chronic anticoagulation    3. Anemia, chronic renal failure, stage 3 (moderate)    4. Anemia, chronic disease    5. Prerenal azotemia    6. CKD (chronic kidney disease), stage IV    7. Pulmonary hypertension    8. Smoker, quit 5/2016, 25 pck-years    9. Diastolic dysfunction    10. Syncope, unspecified syncope type    11. Type 2 diabetes mellitus with stage 4 chronic kidney disease, without long-term current use of insulin    12. Status post placement of implantable loop recorder    13. Vertigo         Plan:       Blaire was seen today for follow-up.    Diagnoses and all orders for this visit:    Abdominal obesity    Chronic anticoagulation    Anemia, chronic renal failure, stage 3 (moderate)    Anemia, chronic disease    Prerenal azotemia    CKD (chronic kidney disease), stage IV    Pulmonary hypertension    Smoker, quit 5/2016, 25 pck-years    Diastolic dysfunction    Syncope, unspecified syncope type    Type 2 diabetes mellitus with stage 4 chronic kidney disease, without long-term current use of insulin    Status post placement of implantable loop recorder    Vertigo    - All medical issues reviewed, highly recommend close follow up with Dr. Jo and need a Life Alert  - Highly recommend getting guideline directed vaccinations. Refer to Dr. Edmonds.  - Would like to switch off glipizide to more cardiac friendly medications: SGLT2 and or GLP-1 receptor agonist, will refer back to PCP and Dr. Jo  - Instruction for Mediterranean diet and heart healthy exercise given.  - Need to drink 100 oz of fluid daily  - CV status stable, all medications reviewed, patient acknowledge good understanding.  - Check home blood pressure, 2 days weekly, do 2 readings within 5 minutes in AM and  PM, keep log for review.  - Recommend at least biannual cardiovascular evaluation in view of her significant risk factors. Patient's preference.    Patient Active Problem List   Diagnosis    Diabetic neuropathy, type II diabetes mellitus    CKD (chronic kidney disease), stage III, eGFR 39, progressive 31    Hypothyroidism    Hypertension associated with diabetes    Hyperlipidemia associated with type 2 diabetes mellitus    Type 2 diabetes mellitus with stage 4 chronic kidney disease    Right carotid bruit    COPD mixed type    Anxiety    Abdominal aortic aneurysm without rupture    Hip arthritis    Degenerative spinal arthritis    Osteoporosis    Left ventricular diastolic dysfunction with preserved systolic function    Hypertensive left ventricular hypertrophy, without heart failure    Smoker, quit 5/2016, 25 pck-years    Abdominal obesity    Absolute anemia    Syncopal episodes, last 4/2020    Body mass index (BMI) 23.0-23.9, adult, today 24.1    Iron deficiency anemia due to chronic blood loss    Proteinuria due to type 2 diabetes mellitus    History of syncope in childhood    Prerenal azotemia    Drug-induced constipation    Asthmatic bronchitis with acute exacerbation    Controlled type 2 diabetes mellitus, without long-term current use of insulin    Acute pulmonary embolism    Asthma-COPD overlap syndrome    Eosinophilic asthma    Moderate malnutrition    Type 2 diabetes mellitus with kidney complication, without long-term current use of insulin    Chronic anticoagulation    Constipation    DVT (deep venous thrombosis)    History of pulmonary embolism    Anemia due to multiple mechanisms    Anemia, chronic disease    Normocytic normochromic anemia    Anemia, chronic renal failure, stage 3 (moderate)    Homocysteinemia    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    Hyperkalemia    Diastolic CHF, chronic    Anemia of chronic disease    Kidney stones    Bradycardia     Macrocytic anemia    Orthostatic hypotension    Closed left hip fracture    Hip pain, left    Chronic respiratory failure with hypoxia    Nonrheumatic aortic valve stenosis    Mitral stenosis    Hypoalbuminemia    Hypoglycemia    CKD (chronic kidney disease), stage IV    Vertigo    Left foot pain    Posterior tibial tendon dysfunction, left    Arthritis    Pulmonary hypertension    Diastolic dysfunction    Status post placement of implantable loop recorder     Greater than 50% was spent in counseling and coordination of care. The above assessment and plan have been discussed at length. Labs and procedure over the last 6 months reviewed. Problem List updated. Asked to bring in all active medications / pills bottles with next visit.

## 2020-08-28 ENCOUNTER — OFFICE VISIT (OUTPATIENT)
Dept: FAMILY MEDICINE | Facility: CLINIC | Age: 85
End: 2020-08-28
Payer: MEDICARE

## 2020-08-28 VITALS
HEART RATE: 71 BPM | DIASTOLIC BLOOD PRESSURE: 70 MMHG | RESPIRATION RATE: 16 BRPM | SYSTOLIC BLOOD PRESSURE: 122 MMHG | WEIGHT: 129.63 LBS | TEMPERATURE: 98 F | HEIGHT: 64 IN | BODY MASS INDEX: 22.13 KG/M2 | OXYGEN SATURATION: 96 %

## 2020-08-28 DIAGNOSIS — I15.2 HYPERTENSION ASSOCIATED WITH DIABETES: ICD-10-CM

## 2020-08-28 DIAGNOSIS — E11.40 TYPE 2 DIABETES MELLITUS WITH DIABETIC NEUROPATHY, WITHOUT LONG-TERM CURRENT USE OF INSULIN: Primary | ICD-10-CM

## 2020-08-28 DIAGNOSIS — J44.9 COPD MIXED TYPE: ICD-10-CM

## 2020-08-28 DIAGNOSIS — E11.59 HYPERTENSION ASSOCIATED WITH DIABETES: ICD-10-CM

## 2020-08-28 PROCEDURE — 99214 PR OFFICE/OUTPT VISIT, EST, LEVL IV, 30-39 MIN: ICD-10-PCS | Mod: S$GLB,,, | Performed by: NURSE PRACTITIONER

## 2020-08-28 PROCEDURE — 99999 PR PBB SHADOW E&M-EST. PATIENT-LVL V: ICD-10-PCS | Mod: PBBFAC,,, | Performed by: NURSE PRACTITIONER

## 2020-08-28 PROCEDURE — 99214 OFFICE O/P EST MOD 30 MIN: CPT | Mod: S$GLB,,, | Performed by: NURSE PRACTITIONER

## 2020-08-28 PROCEDURE — 1159F PR MEDICATION LIST DOCUMENTED IN MEDICAL RECORD: ICD-10-PCS | Mod: S$GLB,,, | Performed by: NURSE PRACTITIONER

## 2020-08-28 PROCEDURE — 1125F PR PAIN SEVERITY QUANTIFIED, PAIN PRESENT: ICD-10-PCS | Mod: S$GLB,,, | Performed by: NURSE PRACTITIONER

## 2020-08-28 PROCEDURE — 1159F MED LIST DOCD IN RCRD: CPT | Mod: S$GLB,,, | Performed by: NURSE PRACTITIONER

## 2020-08-28 PROCEDURE — 1101F PR PT FALLS ASSESS DOC 0-1 FALLS W/OUT INJ PAST YR: ICD-10-PCS | Mod: CPTII,S$GLB,, | Performed by: NURSE PRACTITIONER

## 2020-08-28 PROCEDURE — 1101F PT FALLS ASSESS-DOCD LE1/YR: CPT | Mod: CPTII,S$GLB,, | Performed by: NURSE PRACTITIONER

## 2020-08-28 PROCEDURE — 99999 PR PBB SHADOW E&M-EST. PATIENT-LVL V: CPT | Mod: PBBFAC,,, | Performed by: NURSE PRACTITIONER

## 2020-08-28 PROCEDURE — 1125F AMNT PAIN NOTED PAIN PRSNT: CPT | Mod: S$GLB,,, | Performed by: NURSE PRACTITIONER

## 2020-08-31 NOTE — PROGRESS NOTES
"Subjective:       Patient ID: Blaire Cage is a 89 y.o. female.    Chief Complaint: Medication Refill    Chief Complaint  Chief Complaint   Patient presents with    Medication Refill       HPI  Blaire Cage is a 89 y.o. female with medical diagnoses as listed in the medical history and problem list that presents for medication discussion.  Established patient with last clinic appointment 6/29/2020.  The patient is currently taking glipizde 2.5mg once daily for DM2.  She recently had a visit wither her cardiologist who suggests that she be taken off this medication to switch to a "more cardiac friendly" medication.  The patient reports that she stopped taking the medication after seeing Dr. Herbert.  She reports her blood glucose has been in the 90s when she takes it first thing in the AM.  She would like a trial off of this.  She also requests a refill of her trelegy.        PAST MEDICAL HISTORY:  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 8/7/2018    Hip arthritis 3/1/2016    Homocysteinemia 8/7/2018    Hyperlipidemia     Hypertension     Normocytic normochromic anemia 6/29/2018    PE (pulmonary thromboembolism) 06/09/2018    Pneumonia of right lower lobe due to infectious organism 9/11/2017    Thyroid disease     hypothyroid    Type 2 diabetes mellitus with stage 3 chronic kidney disease 1/18/2013       PAST SURGICAL HISTORY:  Past Surgical History:   Procedure Laterality Date    APPENDECTOMY      CHOLECYSTECTOMY   "    FRACTURE SURGERY      right hip     HERNIA REPAIR      groin    HYSTERECTOMY      INSERTION OF IMPLANTABLE LOOP RECORDER N/A 2018    Procedure: Insertion, Implantable Loop Recorder;  Surgeon: Ashok Herbert MD;  Location: [facility] CATH LAB;  Service: Cardiology;  Laterality: N/A;    PERCUTANEOUS PINNING OF HIP Left 3/23/2019    Procedure: PINNING, HIP, PERCUTANEOUS;  Surgeon: Jorje Hamlin MD;  Location: [facility] OR;  Service: Orthopedics;  Laterality: Left;    VARICOSE VEIN SURGERY         SOCIAL HISTORY:  Social History     Socioeconomic History    Marital status: Legally      Spouse name: Not on file    Number of children: Not on file    Years of education: Not on file    Highest education level: Not on file   Occupational History    Not on file   Social Needs    Financial resource strain: Not very hard    Food insecurity     Worry: Never true     Inability: Never true    Transportation needs     Medical: No     Non-medical: No   Tobacco Use    Smoking status: Former Smoker     Packs/day: 0.35     Years: 44.00     Pack years: 15.40     Types: Cigarettes     Quit date: 2015     Years since quittin.3    Smokeless tobacco: Never Used    Tobacco comment: 2015   Substance and Sexual Activity    Alcohol use: No     Alcohol/week: 0.0 standard drinks     Frequency: Never     Binge frequency: Never    Drug use: No    Sexual activity: Never   Lifestyle    Physical activity     Days per week: 0 days     Minutes per session: 0 min    Stress: To some extent   Relationships    Social connections     Talks on phone: More than three times a week     Gets together: More than three times a week     Attends [employer] service: Not on file     Active member of club or organization: No     Attends meetings of clubs or organizations: Never     Relationship status:    Other Topics Concern    Not on file   Social History Narrative    Not on file       FAMILY  HISTORY:  Family History   Problem Relation Age of Onset    Hypertension Mother     Heart disease Mother     Lung disease Father     Diabetes Sister     Diabetes Brother     Kidney disease Son        ALLERGIES AND MEDICATIONS: updated and reviewed.  Review of patient's allergies indicates:   Allergen Reactions    Carvedilol Other (See Comments)     Bradycardia and syncope    Boniva [ibandronate]     Codeine     Hydralazine analogues     Iodinated contrast media Hives    Kionex [sodium polystyrene sulfonate] Diarrhea     From encounter 12/11/17 for diarrhea--not true allergy.    Lisinopril     Morphine      Current Outpatient Medications   Medication Sig Dispense Refill    acetaminophen (TYLENOL) 325 MG tablet Take 2 tablets (650 mg total) by mouth every 4 (four) hours as needed. (Patient taking differently: Take by mouth every 4 (four) hours as needed for Pain. )  0    albuterol-ipratropium (DUO-NEB) 2.5 mg-0.5 mg/3 mL nebulizer solution Take 3 mLs by nebulization every 6 (six) hours as needed for Wheezing. Rescue 120 vial 11    amLODIPine (NORVASC) 5 MG tablet Take 1 tablet (5 mg total) by mouth 2 (two) times daily. 180 tablet 3    apixaban (ELIQUIS) 5 mg Tab Take 1 tablet (5 mg total) by mouth 2 (two) times daily. 180 tablet 3    ascorbic acid (VITAMIN C) 500 MG tablet Take 500 mg by mouth once daily.        aspirin (ECOTRIN) 81 MG EC tablet Take 81 mg by mouth once daily.        calcium carbonate (OS-TASIA) 500 mg calcium (1,250 mg) tablet Take 1 tablet by mouth 2 (two) times daily.        cyanocobalamin/folic acid (FOLTRATE ORAL) vitamin B12-folic acid   2.5 - 25-2MG      diclofenac sodium (VOLTAREN) 1 % Gel Apply 2 g topically once daily. 100 g prn    docusate sodium (COLACE) 100 MG capsule Take 1 capsule (100 mg total) by mouth 2 (two) times daily. 90 capsule 0    ferrous sulfate (FEOSOL) 325 mg (65 mg iron) Tab tablet Take 1 tablet (325 mg total) by mouth 2 (two) times daily with meals.  180 tablet 3    fish oil-omega-3 fatty acids 300-1,000 mg capsule Take 2 g by mouth every evening.       fluocinolone (SYNALAR) 0.01 % external solution Apply topically 2 (two) times daily. 90 mL 1    fluticasone (FLONASE) 50 mcg/actuation nasal spray 2 sprays by Each Nare route once daily. 48 g 3    fluticasone-umeclidin-vilanter (TRELEGY ELLIPTA) 100-62.5-25 mcg DsDv Inhale 1 puff into the lungs once daily. 60 each 11    folic acid-vit B6-vit B12 2.5-25-2 mg (FOLBIC) 2.5-25-2 mg Tab Take 1 tablet by mouth once daily. 90 tablet 3    furosemide (LASIX) 20 MG tablet Take 1 tablet only as needed, for swelling, increase SOB, weight gain of 3 lbs overnight or 5 lbs for the week 24 tablet 3    furosemide (LASIX) 40 MG tablet Take 40 mg by mouth once daily.      gabapentin (NEURONTIN) 300 MG capsule Take 1 capsule (300 mg total) by mouth every evening. 90 capsule 3    glipiZIDE (GLUCOTROL) 2.5 MG TR24 Take 2.5 mg by mouth once daily.      levothyroxine (SYNTHROID) 88 MCG tablet Take 88 mcg by mouth once daily.      lidocaine (LIDODERM) 5 % Place 1 patch onto the skin once daily. Remove & Discard patch within 12 hours or as directed by MD 3 patch 0    linaCLOtide (LINZESS) 72 mcg Cap capsule Take 1 capsule (72 mcg total) by mouth once daily. 90 capsule 3    magnesium oxide (MAG-OX) 400 mg (241.3 mg magnesium) tablet TAKE 1 TABLET BY MOUTH ONCE DAILY 90 tablet 3    meclizine (ANTIVERT) 25 mg tablet Take 1 tablet (25 mg total) by mouth 3 (three) times daily as needed. 20 tablet 0    montelukast (SINGULAIR) 10 mg tablet TAKE 1 TABLET BY MOUTH ONCE DAILY IN THE EVENING 90 tablet 1    multivitamin (THERAGRAN) tablet Take 1 tablet by mouth once daily.      mupirocin (BACTROBAN) 2 % ointment Apply topically 2 (two) times daily. 30 g 1    nitroGLYCERIN (NITROSTAT) 0.4 MG SL tablet Place 1 tablet (0.4 mg total) under the tongue every 5 (five) minutes as needed for Chest pain. As needed (Patient taking differently:  Place 0.4 mg under the tongue every 5 (five) minutes as needed for Chest pain. Seek medical help if pain is not relieved after the third dose.) 30 tablet 3    omega 3-dha-epa-fish oil (FISH OIL) 300-1,000 mg CpDR capsule 1 capsule      omeprazole (PRILOSEC) 40 MG capsule Take 1 capsule (40 mg total) by mouth once daily. 30 capsule 11    sertraline (ZOLOFT) 25 MG tablet Take 1 tablet (25 mg total) by mouth once daily. 90 tablet 3    simvastatin (ZOCOR) 40 MG tablet TAKE 1 TABLET BY MOUTH ONCE DAILY IN THE EVENING 90 tablet 3    traMADoL (ULTRAM) 50 mg tablet Take 1 tablet (50 mg total) by mouth every 8 (eight) hours as needed for Pain. 9 tablet 0    vitamin D 1000 units Tab Take 1 tablet (1,000 Units total) by mouth once daily. 90 tablet 3     No current facility-administered medications for this visit.        I have reviewed the patient's medical, family, and social history in detail and updated the computerized patient record.    Review of Systems   Constitutional: Negative for activity change, chills, fatigue, fever and unexpected weight change.   HENT: Negative for congestion, hearing loss, nosebleeds, postnasal drip, rhinorrhea, sinus pressure, sinus pain, sore throat and trouble swallowing.    Eyes: Negative for pain, discharge, redness and visual disturbance.   Respiratory: Negative for cough, chest tightness, shortness of breath and wheezing.    Cardiovascular: Negative for chest pain and palpitations.   Gastrointestinal: Negative for abdominal pain, blood in stool, constipation, diarrhea, nausea and vomiting.   Endocrine: Negative for cold intolerance, heat intolerance, polydipsia and polyuria.   Genitourinary: Negative for difficulty urinating, dysuria, hematuria and menstrual problem.   Musculoskeletal: Positive for arthralgias. Negative for back pain, joint swelling and neck pain.   Neurological: Negative for dizziness, syncope, weakness, light-headedness and headaches.   Psychiatric/Behavioral:  "Negative for agitation, confusion and dysphoric mood. The patient is not nervous/anxious.          Objective:      Vitals:    08/28/20 1445   BP: 122/70   BP Location: Right arm   Patient Position: Sitting   BP Method: Small (Manual)   Pulse: 71   Resp: 16   Temp: 98.1 °F (36.7 °C)   TempSrc: Temporal   SpO2: 96%   Weight: 58.8 kg (129 lb 10.1 oz)   Height: 5' 4" (1.626 m)     Physical Exam  Constitutional:       Appearance: She is well-developed.   HENT:      Head: Normocephalic and atraumatic.      Nose: Nose normal.   Eyes:      General: Lids are normal.      Conjunctiva/sclera: Conjunctivae normal.      Pupils: Pupils are equal, round, and reactive to light.   Neck:      Musculoskeletal: Full passive range of motion without pain.   Cardiovascular:      Rate and Rhythm: Normal rate and regular rhythm.   Pulmonary:      Effort: Pulmonary effort is normal. No respiratory distress.      Breath sounds: Normal breath sounds.   Abdominal:      Palpations: Abdomen is soft.   Skin:     General: Skin is warm and dry.   Neurological:      Mental Status: She is alert and oriented to person, place, and time.   Psychiatric:         Speech: Speech normal.         Behavior: Behavior normal.           Assessment:       1. Type 2 diabetes mellitus with diabetic neuropathy, without long-term current use of insulin    2. COPD mixed type    3. Hypertension associated with diabetes    4. BMI 22.0-22.9, adult          Plan:       Blaire was seen today for medication refill.    Diagnoses and all orders for this visit:    Type 2 diabetes mellitus with diabetic neuropathy, without long-term current use of insulin  -Continue trial discontinuation of glipizide   -Monitor blood glucose daily and record  -Bring logs to next clinic visit  -ADA diet   COPD mixed type  -     fluticasone-umeclidin-vilanter (TRELEGY ELLIPTA) 100-62.5-25 mcg DsDv; Inhale 1 puff into the lungs once daily.        -    Stable, continue current medication regimen "   Hypertension associated with diabetes  -Controlled, continue current medication regimen  -Low salt diet   BMI 22.0-22.9, adult  -Stable, continue current dietary intake     Follow up in about 2 weeks (around 9/11/2020) for blood glucose log review .

## 2020-09-09 ENCOUNTER — OFFICE VISIT (OUTPATIENT)
Dept: FAMILY MEDICINE | Facility: CLINIC | Age: 85
End: 2020-09-09
Payer: MEDICARE

## 2020-09-09 VITALS
TEMPERATURE: 98 F | HEART RATE: 72 BPM | WEIGHT: 132.94 LBS | OXYGEN SATURATION: 97 % | BODY MASS INDEX: 22.7 KG/M2 | DIASTOLIC BLOOD PRESSURE: 64 MMHG | HEIGHT: 64 IN | SYSTOLIC BLOOD PRESSURE: 156 MMHG

## 2020-09-09 DIAGNOSIS — E78.5 HYPERLIPIDEMIA ASSOCIATED WITH TYPE 2 DIABETES MELLITUS: ICD-10-CM

## 2020-09-09 DIAGNOSIS — E03.9 HYPOTHYROIDISM, UNSPECIFIED TYPE: ICD-10-CM

## 2020-09-09 DIAGNOSIS — I15.2 HYPERTENSION ASSOCIATED WITH DIABETES: ICD-10-CM

## 2020-09-09 DIAGNOSIS — E11.40 TYPE 2 DIABETES MELLITUS WITH DIABETIC NEUROPATHY, WITHOUT LONG-TERM CURRENT USE OF INSULIN: Primary | ICD-10-CM

## 2020-09-09 DIAGNOSIS — E11.69 HYPERLIPIDEMIA ASSOCIATED WITH TYPE 2 DIABETES MELLITUS: ICD-10-CM

## 2020-09-09 DIAGNOSIS — I05.0 MITRAL VALVE STENOSIS, UNSPECIFIED ETIOLOGY: ICD-10-CM

## 2020-09-09 DIAGNOSIS — E11.59 HYPERTENSION ASSOCIATED WITH DIABETES: ICD-10-CM

## 2020-09-09 PROCEDURE — 1101F PT FALLS ASSESS-DOCD LE1/YR: CPT | Mod: CPTII,S$GLB,, | Performed by: NURSE PRACTITIONER

## 2020-09-09 PROCEDURE — 1159F PR MEDICATION LIST DOCUMENTED IN MEDICAL RECORD: ICD-10-PCS | Mod: S$GLB,,, | Performed by: NURSE PRACTITIONER

## 2020-09-09 PROCEDURE — 99999 PR PBB SHADOW E&M-EST. PATIENT-LVL V: CPT | Mod: PBBFAC,,, | Performed by: NURSE PRACTITIONER

## 2020-09-09 PROCEDURE — 99214 PR OFFICE/OUTPT VISIT, EST, LEVL IV, 30-39 MIN: ICD-10-PCS | Mod: S$GLB,,, | Performed by: NURSE PRACTITIONER

## 2020-09-09 PROCEDURE — 1159F MED LIST DOCD IN RCRD: CPT | Mod: S$GLB,,, | Performed by: NURSE PRACTITIONER

## 2020-09-09 PROCEDURE — 99999 PR PBB SHADOW E&M-EST. PATIENT-LVL V: ICD-10-PCS | Mod: PBBFAC,,, | Performed by: NURSE PRACTITIONER

## 2020-09-09 PROCEDURE — 1101F PR PT FALLS ASSESS DOC 0-1 FALLS W/OUT INJ PAST YR: ICD-10-PCS | Mod: CPTII,S$GLB,, | Performed by: NURSE PRACTITIONER

## 2020-09-09 PROCEDURE — 99214 OFFICE O/P EST MOD 30 MIN: CPT | Mod: S$GLB,,, | Performed by: NURSE PRACTITIONER

## 2020-09-09 RX ORDER — ALLOPURINOL 100 MG/1
100 TABLET ORAL DAILY
COMMUNITY
Start: 2020-08-21 | End: 2022-01-01 | Stop reason: SDUPTHER

## 2020-09-09 RX ORDER — ALBUTEROL SULFATE 0.83 MG/ML
2.5 SOLUTION RESPIRATORY (INHALATION) EVERY 6 HOURS PRN
COMMUNITY
Start: 2020-08-20 | End: 2021-01-31

## 2020-09-09 NOTE — PATIENT INSTRUCTIONS
Understanding Carbohydrates, Fats, and Protein  Food is a source of fuel and nourishment for your body. Its also a source of pleasure. Having diabetes doesnt mean you have to eat special foods or give up desserts. Instead, your dietitian can show you how to plan meals to suit your body. To start, learn how different foods affect blood sugar.  Carbohydrates  Carbohydrates are the main source of fuel for the body. Carbohydrates raise blood sugar. Many people think carbohydrates are only found in pasta or bread. But carbohydrates are actually in many kinds of foods:  · Sugars occur naturally in foods such as fruit, milk, honey, and molasses. Sugars can also be added to many foods, from cereals and yogurt to candy and desserts. Sugars raise blood sugar.  · Starches are found in bread, cereals, pasta, and dried beans. Theyre also found in corn, peas, potatoes, yam, acorn squash, and butternut squash. Starches also raise blood sugar.   · Fiber is found in foods such as vegetables, fruits, beans, and whole grains. Unlike other carbs, fiber isnt digested or absorbed. So it doesnt raise blood sugar. In fact, fiber can help keep blood sugar from rising too fast. It also helps keep blood cholesterol at a healthy level.  Did you know?  Even though carbohydrates raise blood sugar, its best to have some in every meal. They are an important part of a healthy diet.   Fat  Fat is an energy source that can be stored until needed. Fat does not raise blood sugar. However, it can raise blood cholesterol, increasing the risk of heart disease. Fat is also high in calories, which can cause weight gain. Not all types of fat are the same.  More Healthy:  · Monounsaturated fats are mostly found in vegetable oils, such as olive, canola, and peanut oils. They are also found in avocados and some nuts. Monounsaturated fats are healthy for your heart. Thats because they lower LDL (unhealthy) cholesterol.  · Polyunsaturated fats are mostly  found in vegetable oils, such as corn, safflower, and soybean oils. They are also found in some seeds, nuts, and fish. Polyunsaturated fats lower LDL (unhealthy) cholesterol. So, choosing them instead of saturated fats is healthy for your heart. Certain unsaturated fats can help lower triglycerides.   Less Healthy:  · Saturated fats are found in animal products, such as meat, poultry, whole milk, lard, and butter. Saturated fats raise LDL cholesterol and are not healthy for your heart.  · Hydrogenated oils and trans fats are formed when vegetable oils are processed into solid fats. They are found in many processed foods. Hydrogenated oils and trans fats raise LDL cholesterol and lower HDL (healthy) cholesterol. They are not healthy for your heart.  Protein  Protein helps the body build and repair muscle and other tissue. Protein has little or no effect on blood sugar. However, many foods that contain protein also contain saturated fat. By choosing low-fat protein sources, you can get the benefits of protein without the extra fat:  · Plant protein is found in dry beans and peas, nuts, and soy products, such as tofu and soymilk. These sources tend to be cholesterol-free and low in saturated fat.  · Animal protein is found in fish, poultry, meat, cheese, milk, and eggs. These contain cholesterol and can be high in saturated fat. Aim for lean, lower-fat choices.  Date Last Reviewed: 3/1/2016  © 9633-5002 [employer]. [address] [place] [address], [place] [address]. All rights reserved. This information is not intended as a substitute for professional medical care. Always follow your healthcare professional's instructions.

## 2020-09-09 NOTE — PROGRESS NOTES
Subjective:       Patient ID: Blaire Cage is a 89 y.o. female.    Chief Complaint: Follow-up    Blaire Cage is a 89 y.o. female with medical diagnoses as listed in the medical history and problem list that presents for medication discussion      Patient was taken off the glipizide two weeks ago.        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 8/7/2018    Hip arthritis 3/1/2016    Homocysteinemia 8/7/2018    Hyperlipidemia     Hypertension     Normocytic normochromic anemia 6/29/2018    PE (pulmonary thromboembolism) 06/09/2018    Pneumonia of right lower lobe due to infectious organism 9/11/2017    Thyroid disease     hypothyroid    Type 2 diabetes mellitus with stage 3 chronic kidney disease 1/18/2013       Review of patient's allergies indicates:   Allergen Reactions    Carvedilol Other (See Comments)     Bradycardia and syncope    Boniva [ibandronate]     Codeine     Hydralazine analogues     Iodinated contrast media Hives    Kionex [sodium polystyrene sulfonate] Diarrhea     From encounter 12/11/17 for diarrhea--not true allergy.    Lisinopril     Morphine          Current Outpatient Medications:     acetaminophen (TYLENOL) 325 MG tablet, Take 2 tablets (650 mg total) by mouth every 4 (four) hours as needed. (Patient taking differently: Take by mouth every 4 (four) hours as needed for Pain. ), Disp: , Rfl: 0    albuterol (PROVENTIL) 2.5 mg /3 mL (0.083 %) nebulizer solution, Take 2.5 mg by  nebulization every 6 (six) hours as needed. , Disp: , Rfl:     albuterol-ipratropium (DUO-NEB) 2.5 mg-0.5 mg/3 mL nebulizer solution, Take 3 mLs by nebulization every 6 (six) hours as needed for Wheezing. Rescue, Disp: 120 vial, Rfl: 11    allopurinoL (ZYLOPRIM) 100 MG tablet, Take 100 mg by mouth once daily., Disp: , Rfl:     amLODIPine (NORVASC) 5 MG tablet, Take 1 tablet (5 mg total) by mouth 2 (two) times daily., Disp: 180 tablet, Rfl: 3    apixaban (ELIQUIS) 5 mg Tab, Take 1 tablet (5 mg total) by mouth 2 (two) times daily., Disp: 180 tablet, Rfl: 3    ascorbic acid (VITAMIN C) 500 MG tablet, Take 500 mg by mouth once daily.  , Disp: , Rfl:     aspirin (ECOTRIN) 81 MG EC tablet, Take 81 mg by mouth once daily.  , Disp: , Rfl:     calcium carbonate (OS-TASIA) 500 mg calcium (1,250 mg) tablet, Take 1 tablet by mouth 2 (two) times daily.  , Disp: , Rfl:     diclofenac sodium (VOLTAREN) 1 % Gel, Apply 2 g topically once daily., Disp: 100 g, Rfl: prn    docusate sodium (COLACE) 100 MG capsule, Take 1 capsule (100 mg total) by mouth 2 (two) times daily., Disp: 90 capsule, Rfl: 0    ferrous sulfate (FEOSOL) 325 mg (65 mg iron) Tab tablet, Take 1 tablet (325 mg total) by mouth 2 (two) times daily with meals., Disp: 180 tablet, Rfl: 3    fish oil-omega-3 fatty acids 300-1,000 mg capsule, Take 2 g by mouth every evening. , Disp: , Rfl:     fluocinolone (SYNALAR) 0.01 % external solution, Apply topically 2 (two) times daily., Disp: 90 mL, Rfl: 1    fluticasone (FLONASE) 50 mcg/actuation nasal spray, 2 sprays by Each Nare route once daily., Disp: 48 g, Rfl: 3    fluticasone-umeclidin-vilanter (TRELEGY ELLIPTA) 100-62.5-25 mcg DsDv, Inhale 1 puff into the lungs once daily., Disp: 60 each, Rfl: 11    folic acid-vit B6-vit B12 2.5-25-2 mg (FOLBIC) 2.5-25-2 mg Tab, Take 1 tablet by mouth once daily., Disp: 90 tablet, Rfl: 3    furosemide (LASIX) 20 MG tablet, Take 1 tablet only as needed, for swelling, increase  SOB, weight gain of 3 lbs overnight or 5 lbs for the week, Disp: 24 tablet, Rfl: 3    furosemide (LASIX) 40 MG tablet, Take 40 mg by mouth once daily., Disp: , Rfl:     gabapentin (NEURONTIN) 300 MG capsule, Take 1 capsule (300 mg total) by mouth every evening., Disp: 90 capsule, Rfl: 3    glipiZIDE (GLUCOTROL) 2.5 MG TR24, Take 2.5 mg by mouth once daily., Disp: , Rfl:     levothyroxine (SYNTHROID) 88 MCG tablet, Take 88 mcg by mouth once daily., Disp: , Rfl:     lidocaine (LIDODERM) 5 %, Place 1 patch onto the skin once daily. Remove & Discard patch within 12 hours or as directed by MD, Disp: 3 patch, Rfl: 0    linaCLOtide (LINZESS) 72 mcg Cap capsule, Take 1 capsule (72 mcg total) by mouth once daily., Disp: 90 capsule, Rfl: 3    magnesium oxide (MAG-OX) 400 mg (241.3 mg magnesium) tablet, TAKE 1 TABLET BY MOUTH ONCE DAILY (Patient taking differently: Take 400 mg by mouth once daily. ), Disp: 90 tablet, Rfl: 3    meclizine (ANTIVERT) 25 mg tablet, Take 1 tablet (25 mg total) by mouth 3 (three) times daily as needed., Disp: 20 tablet, Rfl: 0    montelukast (SINGULAIR) 10 mg tablet, TAKE 1 TABLET BY MOUTH ONCE DAILY IN THE EVENING (Patient taking differently: Take 10 mg by mouth every evening. ), Disp: 90 tablet, Rfl: 1    multivitamin (THERAGRAN) tablet, Take 1 tablet by mouth once daily., Disp: , Rfl:     mupirocin (BACTROBAN) 2 % ointment, Apply topically 2 (two) times daily., Disp: 30 g, Rfl: 1    nitroGLYCERIN (NITROSTAT) 0.4 MG SL tablet, Place 1 tablet (0.4 mg total) under the tongue every 5 (five) minutes as needed for Chest pain. As needed (Patient taking differently: Place 0.4 mg under the tongue every 5 (five) minutes as needed for Chest pain. Seek medical help if pain is not relieved after the third dose.), Disp: 30 tablet, Rfl: 3    omeprazole (PRILOSEC) 40 MG capsule, Take 1 capsule (40 mg total) by mouth once daily., Disp: 30 capsule, Rfl: 11    sertraline (ZOLOFT) 25 MG tablet, Take 1  "tablet (25 mg total) by mouth once daily., Disp: 90 tablet, Rfl: 3    simvastatin (ZOCOR) 40 MG tablet, TAKE 1 TABLET BY MOUTH ONCE DAILY IN THE EVENING (Patient taking differently: Take 40 mg by mouth every evening. TAKE 1 TABLET BY MOUTH ONCE DAILY IN THE EVENING), Disp: 90 tablet, Rfl: 3    vitamin D 1000 units Tab, Take 1 tablet (1,000 Units total) by mouth once daily., Disp: 90 tablet, Rfl: 3    cephALEXin (KEFLEX) 500 MG capsule, Take 1 capsule (500 mg total) by mouth 4 (four) times daily. for 5 days, Disp: 20 capsule, Rfl: 0    Review of Systems   Constitutional: Negative for unexpected weight change.   HENT: Negative for trouble swallowing.    Eyes: Negative for visual disturbance.   Respiratory: Negative for shortness of breath.    Cardiovascular: Negative for chest pain, palpitations and leg swelling.   Gastrointestinal: Negative for blood in stool.   Genitourinary: Negative for hematuria.   Skin: Negative for rash.   Allergic/Immunologic: Negative for immunocompromised state.   Neurological: Negative for headaches.   Hematological: Does not bruise/bleed easily.   Psychiatric/Behavioral: Negative for agitation. The patient is not nervous/anxious.        Objective:      BP (!) 156/64   Pulse 72   Temp 98.1 °F (36.7 °C)   Ht 5' 4" (1.626 m)   Wt 60.3 kg (132 lb 15 oz)   LMP  (LMP Unknown)   SpO2 97%   BMI 22.82 kg/m²   Physical Exam  Constitutional:       Appearance: She is well-developed.   Eyes:      Pupils: Pupils are equal, round, and reactive to light.   Neck:      Musculoskeletal: Normal range of motion.   Cardiovascular:      Rate and Rhythm: Normal rate and regular rhythm.      Heart sounds: Normal heart sounds.   Pulmonary:      Effort: Pulmonary effort is normal.      Breath sounds: Normal breath sounds.   Abdominal:      General: Bowel sounds are normal.      Palpations: Abdomen is soft.   Musculoskeletal: Normal range of motion.   Skin:     General: Skin is warm and dry.   Neurological: "      Mental Status: She is alert and oriented to person, place, and time.   Psychiatric:         Behavior: Behavior normal.         Thought Content: Thought content normal.         Judgment: Judgment normal.         Assessment:       1. Type 2 diabetes mellitus with diabetic neuropathy, without long-term current use of insulin    2. Hypertension associated with diabetes    3. Hyperlipidemia associated with type 2 diabetes mellitus    4. Hypothyroidism, unspecified type    5. Mitral valve stenosis, unspecified etiology    6. BMI 20.0-20.9, adult        Plan:       Type 2 diabetes mellitus with diabetic neuropathy, without long-term current use of insulin  -     Hemoglobin A1C; Future; Expected date: 09/09/2020  Stable, continue  medication  Follow the ADA diet  Hemoglobin A1C   Date Value Ref Range Status   05/08/2020 5.7 (H) 4.0 - 5.6 % Final     Comment:     ADA Screening Guidelines:  5.7-6.4%  Consistent with prediabetes  >or=6.5%  Consistent with diabetes  High levels of fetal hemoglobin interfere with the HbA1C  assay. Heterozygous hemoglobin variants (HbS, HgC, etc)do  not significantly interfere with this assay.   However, presence of multiple variants may affect accuracy.     12/14/2019 6.0 (H) 4.0 - 5.6 % Final     Comment:     ADA Screening Guidelines:  5.7-6.4%  Consistent with prediabetes  >or=6.5%  Consistent with diabetes  High levels of fetal hemoglobin interfere with the HbA1C  assay. Heterozygous hemoglobin variants (HbS, HgC, etc)do  not significantly interfere with this assay.   However, presence of multiple variants may affect accuracy.     03/22/2019 6.2 (H) 4.0 - 5.6 % Final     Comment:     ADA Screening Guidelines:  5.7-6.4%  Consistent with prediabetes  >or=6.5%  Consistent with diabetes  High levels of fetal hemoglobin interfere with the HbA1C  assay. Heterozygous hemoglobin variants (HbS, HgC, etc)do  not significantly interfere with this assay.   However, presence of multiple variants may  affect accuracy.       Hypertension associated with diabetes  2 week BP log  2 week nurse visit for BP check  Low sodium diet  BP Readings from Last 3 Encounters:   09/18/20 (!) 153/64   09/09/20 (!) 156/64   08/28/20 122/70     Hyperlipidemia associated with type 2 diabetes mellitus  Stable, on Zocor  Hypothyroidism, unspecified type  Stable, continue medication  Mitral valve stenosis, unspecified etiology    BMI 20.0-20.9, adult  Healthy weight in patient        Patient readiness: acceptance and barriers:none    During the course of the visit the patient was educated and counseled about the following:     Diabetes:  Discussed general issues about diabetes pathophysiology and management.  Addressed ADA diet.  Hypertension:   Dietary sodium restriction.  Regular aerobic exercise.    Goals: Diabetes: Maintain Hemoglobin A1C below 7 and Hypertension: Reduce Blood Pressure    Did patient meet goals/outcomes: No    The following self management tools provided: blood pressure log    Patient Instructions (the written plan) was given to the patient/family.     Time spent with patient: 15 minutes    Barriers to medications present (no )    Adverse reactions to current medications (no)    Over the counter medications reviewed (Yes)

## 2020-09-18 ENCOUNTER — HOSPITAL ENCOUNTER (EMERGENCY)
Facility: HOSPITAL | Age: 85
Discharge: HOME | End: 2020-09-18
Attending: EMERGENCY MEDICINE
Payer: MEDICARE

## 2020-09-18 VITALS
OXYGEN SATURATION: 95 % | RESPIRATION RATE: 20 BRPM | WEIGHT: 126 LBS | SYSTOLIC BLOOD PRESSURE: 153 MMHG | HEART RATE: 69 BPM | BODY MASS INDEX: 20.25 KG/M2 | DIASTOLIC BLOOD PRESSURE: 64 MMHG | HEIGHT: 66 IN | TEMPERATURE: 98 F

## 2020-09-18 DIAGNOSIS — M25.551 PAIN OF RIGHT HIP JOINT: ICD-10-CM

## 2020-09-18 DIAGNOSIS — N39.0 URINARY TRACT INFECTION WITHOUT HEMATURIA, SITE UNSPECIFIED: Primary | ICD-10-CM

## 2020-09-18 DIAGNOSIS — R52 PAIN: ICD-10-CM

## 2020-09-18 DIAGNOSIS — R19.7 DIARRHEA, UNSPECIFIED TYPE: ICD-10-CM

## 2020-09-18 LAB
ALBUMIN SERPL BCP-MCNC: 4 G/DL (ref 3.5–5.2)
ALP SERPL-CCNC: 62 U/L (ref 55–135)
ALT SERPL W/O P-5'-P-CCNC: 24 U/L (ref 10–44)
ANION GAP SERPL CALC-SCNC: 14 MMOL/L (ref 8–16)
AST SERPL-CCNC: 32 U/L (ref 10–40)
BACTERIA #/AREA URNS HPF: NEGATIVE /HPF
BASOPHILS # BLD AUTO: 0.02 K/UL (ref 0–0.2)
BASOPHILS NFR BLD: 0.2 % (ref 0–1.9)
BILIRUB SERPL-MCNC: 0.8 MG/DL (ref 0.1–1)
BILIRUB UR QL STRIP: NEGATIVE
BUN SERPL-MCNC: 79 MG/DL (ref 8–23)
CALCIUM SERPL-MCNC: 9.1 MG/DL (ref 8.7–10.5)
CHLORIDE SERPL-SCNC: 98 MMOL/L (ref 95–110)
CLARITY UR: ABNORMAL
CO2 SERPL-SCNC: 23 MMOL/L (ref 23–29)
COLOR UR: YELLOW
CREAT SERPL-MCNC: 2.3 MG/DL (ref 0.5–1.4)
DIFFERENTIAL METHOD: ABNORMAL
EOSINOPHIL # BLD AUTO: 0 K/UL (ref 0–0.5)
EOSINOPHIL NFR BLD: 0.3 % (ref 0–8)
ERYTHROCYTE [DISTWIDTH] IN BLOOD BY AUTOMATED COUNT: 13 % (ref 11.5–14.5)
EST. GFR  (AFRICAN AMERICAN): 20.9 ML/MIN/1.73 M^2
EST. GFR  (NON AFRICAN AMERICAN): 18.2 ML/MIN/1.73 M^2
GLUCOSE SERPL-MCNC: 118 MG/DL (ref 70–110)
GLUCOSE UR QL STRIP: NEGATIVE
HCT VFR BLD AUTO: 30.5 % (ref 37–48.5)
HGB BLD-MCNC: 10.1 G/DL (ref 12–16)
HGB UR QL STRIP: ABNORMAL
HYALINE CASTS #/AREA URNS LPF: 5 /LPF
IMM GRANULOCYTES # BLD AUTO: 0.04 K/UL (ref 0–0.04)
IMM GRANULOCYTES NFR BLD AUTO: 0.4 % (ref 0–0.5)
KETONES UR QL STRIP: NEGATIVE
LEUKOCYTE ESTERASE UR QL STRIP: ABNORMAL
LIPASE SERPL-CCNC: 36 U/L (ref 4–60)
LYMPHOCYTES # BLD AUTO: 0.7 K/UL (ref 1–4.8)
LYMPHOCYTES NFR BLD: 6.9 % (ref 18–48)
MCH RBC QN AUTO: 32 PG (ref 27–31)
MCHC RBC AUTO-ENTMCNC: 33.1 G/DL (ref 32–36)
MCV RBC AUTO: 97 FL (ref 82–98)
MICROSCOPIC COMMENT: ABNORMAL
MONOCYTES # BLD AUTO: 1.3 K/UL (ref 0.3–1)
MONOCYTES NFR BLD: 12.6 % (ref 4–15)
NEUTROPHILS # BLD AUTO: 8.4 K/UL (ref 1.8–7.7)
NEUTROPHILS NFR BLD: 79.6 % (ref 38–73)
NITRITE UR QL STRIP: NEGATIVE
NRBC BLD-RTO: 0 /100 WBC
PH UR STRIP: 5 [PH] (ref 5–8)
PLATELET # BLD AUTO: 166 K/UL (ref 150–350)
PMV BLD AUTO: 11.2 FL (ref 9.2–12.9)
POTASSIUM SERPL-SCNC: 4.5 MMOL/L (ref 3.5–5.1)
PROT SERPL-MCNC: 6.9 G/DL (ref 6–8.4)
PROT UR QL STRIP: ABNORMAL
RBC # BLD AUTO: 3.16 M/UL (ref 4–5.4)
RBC #/AREA URNS HPF: 4 /HPF (ref 0–4)
SODIUM SERPL-SCNC: 135 MMOL/L (ref 136–145)
SP GR UR STRIP: 1.01 (ref 1–1.03)
SQUAMOUS #/AREA URNS HPF: 1 /HPF
TROPONIN I SERPL DL<=0.01 NG/ML-MCNC: <0.03 NG/ML
URN SPEC COLLECT METH UR: ABNORMAL
UROBILINOGEN UR STRIP-ACNC: NEGATIVE EU/DL
WBC # BLD AUTO: 10.51 K/UL (ref 3.9–12.7)
WBC #/AREA URNS HPF: 55 /HPF (ref 0–5)

## 2020-09-18 PROCEDURE — 81001 URINALYSIS AUTO W/SCOPE: CPT

## 2020-09-18 PROCEDURE — 36415 COLL VENOUS BLD VENIPUNCTURE: CPT

## 2020-09-18 PROCEDURE — 93010 EKG 12-LEAD: ICD-10-PCS | Mod: ,,, | Performed by: INTERNAL MEDICINE

## 2020-09-18 PROCEDURE — 93005 ELECTROCARDIOGRAM TRACING: CPT | Performed by: INTERNAL MEDICINE

## 2020-09-18 PROCEDURE — 85025 COMPLETE CBC W/AUTO DIFF WBC: CPT

## 2020-09-18 PROCEDURE — 87186 SC STD MICRODIL/AGAR DIL: CPT

## 2020-09-18 PROCEDURE — 80053 COMPREHEN METABOLIC PANEL: CPT

## 2020-09-18 PROCEDURE — 87077 CULTURE AEROBIC IDENTIFY: CPT

## 2020-09-18 PROCEDURE — 84484 ASSAY OF TROPONIN QUANT: CPT

## 2020-09-18 PROCEDURE — 87086 URINE CULTURE/COLONY COUNT: CPT

## 2020-09-18 PROCEDURE — 83690 ASSAY OF LIPASE: CPT

## 2020-09-18 PROCEDURE — 93010 ELECTROCARDIOGRAM REPORT: CPT | Mod: ,,, | Performed by: INTERNAL MEDICINE

## 2020-09-18 PROCEDURE — 99285 EMERGENCY DEPT VISIT HI MDM: CPT | Mod: 25

## 2020-09-18 RX ORDER — CEPHALEXIN 500 MG/1
500 CAPSULE ORAL 4 TIMES DAILY
Qty: 20 CAPSULE | Refills: 0 | Status: SHIPPED | OUTPATIENT
Start: 2020-09-18 | End: 2020-09-23

## 2020-09-18 NOTE — ED PROVIDER NOTES
Encounter Date: 9/18/2020       History     Chief Complaint   Patient presents with    Diarrhea     89-year-old female with a history of anemia, CHF, COPD, HLD, HTN, hypothyroidism presents to the ER with multiple complaints.  Patient states that this morning after breakfast, she developed achy abdominal pain and multiple episodes of watery, brown stool.  Also complaining of pain in her neck going down her back.  Also complaining of groin pain, right worse than left.  Denies fever, nausea vomiting, chest pain, shortness of breath.  Endorses some urinary discomfort as well as vaginal discharge.        Review of patient's allergies indicates:   Allergen Reactions    Carvedilol Other (See Comments)     Bradycardia and syncope    Boniva [ibandronate]     Codeine     Hydralazine analogues     Iodinated contrast media Hives    Kionex [sodium polystyrene sulfonate] Diarrhea     From encounter 12/11/17 for diarrhea--not true allergy.   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 8/7/2018    Hip arthritis 3/1/2016    Homocysteinemia 8/7/2018    Hyperlipidemia     Hypertension     Normocytic normochromic anemia 6/29/2018    PE (pulmonary thromboembolism) 06/09/2018    Pneumonia of right lower lobe due to infectious organism 9/11/2017    Thyroid disease     hypothyroid    Type 2 diabetes mellitus with stage 3 chronic kidney disease 1/18/2013     Past Surgical History:   Procedure Laterality Date     APPENDECTOMY      CHOLECYSTECTOMY      FRACTURE SURGERY      right hip     HERNIA REPAIR      groin    HYSTERECTOMY      INSERTION OF IMPLANTABLE LOOP RECORDER N/A 2018    Procedure: Insertion, Implantable Loop Recorder;  Surgeon: Ashok Herbert MD;  Location: [facility] CATH LAB;  Service: Cardiology;  Laterality: N/A;    PERCUTANEOUS PINNING OF HIP Left 3/23/2019    Procedure: PINNING, HIP, PERCUTANEOUS;  Surgeon: Jorje Hamlin MD;  Location: [facility] OR;  Service: Orthopedics;  Laterality: Left;    VARICOSE VEIN SURGERY       Family History   Problem Relation Age of Onset    Hypertension Mother     Heart disease Mother     Lung disease Father     Diabetes Sister     Diabetes Brother     Kidney disease Son      Social History     Tobacco Use    Smoking status: Former Smoker     Packs/day: 0.35     Years: 44.00     Pack years: 15.40     Types: Cigarettes     Quit date: 2015     Years since quittin.3    Smokeless tobacco: Never Used    Tobacco comment: 2015   Substance Use Topics    Alcohol use: No     Alcohol/week: 0.0 standard drinks     Frequency: Never     Binge frequency: Never    Drug use: No     Review of Systems   Constitutional: Negative for fever.   HENT: Negative for sore throat.    Respiratory: Negative for shortness of breath.    Cardiovascular: Negative for chest pain.   Gastrointestinal: Positive for abdominal pain and diarrhea. Negative for nausea and vomiting.   Genitourinary: Positive for dysuria, pelvic pain and vaginal discharge. Negative for vaginal bleeding and vaginal pain.   Musculoskeletal: Negative for back pain.   Skin: Negative for rash.   Neurological: Negative for weakness.   Hematological: Does not bruise/bleed easily.   All other systems reviewed and are negative.      Physical Exam     Initial Vitals [20 1644]   BP Pulse Resp Temp SpO2   (!) 165/66 75 20 98.2 °F (36.8 °C) 97 %      MAP       --         Physical Exam    Constitutional: She  appears well-developed and well-nourished. No distress.   HENT:   Head: Normocephalic and atraumatic.   Mouth/Throat: Oropharynx is clear and moist.   Eyes: Conjunctivae and EOM are normal. Pupils are equal, round, and reactive to light.   Neck: Normal range of motion.   Cardiovascular: Normal rate, regular rhythm, normal heart sounds and intact distal pulses.   No murmur heard.  Pulmonary/Chest: Breath sounds normal. No respiratory distress. She has no wheezes. She has no rhonchi. She has no rales.   Abdominal: Soft. Bowel sounds are normal. She exhibits no distension. There is generalized abdominal tenderness. There is no rigidity, no rebound and no guarding.   Very mild generalized abdominal tenderness.  No focal tenderness.     Genitourinary:    Pelvic exam was performed with patient supine.   There is no rash, tenderness, lesion or injury on the right labia. There is no rash, tenderness, lesion or injury on the left labia.   Musculoskeletal: No tenderness or edema.      Comments: Bilateral hips with pain with range of motion, right worse than left.  Neurovascularly intact distally.   Neurological: She is alert.   Skin: Skin is warm and dry.   Psychiatric: She has a normal mood and affect. Thought content normal.         ED Course   Procedures  Labs Reviewed   CBC W/ AUTO DIFFERENTIAL - Abnormal; Notable for the following components:       Result Value    RBC 3.16 (*)     Hemoglobin 10.1 (*)     Hematocrit 30.5 (*)     Mean Corpuscular Hemoglobin 32.0 (*)     Gran # (ANC) 8.4 (*)     Lymph # 0.7 (*)     Mono # 1.3 (*)     Gran% 79.6 (*)     Lymph% 6.9 (*)     All other components within normal limits   COMPREHENSIVE METABOLIC PANEL - Abnormal; Notable for the following components:    Sodium 135 (*)     Glucose 118 (*)     BUN, Bld 79 (*)     Creatinine 2.3 (*)     eGFR if  20.9 (*)     eGFR if non  18.2 (*)     All other components within normal limits   URINALYSIS, REFLEX TO  URINE CULTURE - Abnormal; Notable for the following components:    Appearance, UA Hazy (*)     Protein, UA Trace (*)     Occult Blood UA 1+ (*)     Leukocytes, UA 3+ (*)     All other components within normal limits    Narrative:     Specimen Source->Urine   URINALYSIS MICROSCOPIC - Abnormal; Notable for the following components:    WBC, UA 55 (*)     Hyaline Casts, UA 5 (*)     All other components within normal limits    Narrative:     Specimen Source->Urine   CULTURE, URINE   LIPASE   TROPONIN I          Imaging Results          CT Abdomen Pelvis  Without Contrast (Final result)  Result time 09/18/20 18:08:59    Final result by Juancho Navarro MD (09/18/20 18:08:59)                 Narrative:    CMS MANDATED QUALITY DATA - CT RADIATION - 436    All CT scans at this facility utilize dose modulation, iterative  reconstruction, and/or weight based dosing when appropriate to reduce  radiation dose to as low as reasonably achievable.        Reason: Abdominal pain, acute, nonlocalized  Diarrhea. Also pelvic pain    TECHNIQUE: CT abdomen and pelvis without IV or oral contrast. Study is  tailored for detection of urinary tract calculi and evaluation of  solid organs, hollow viscera, and vascular structures is limited.    COMPARISON: CT 2/16/2020    CT ABDOMEN:  Bilateral fat-containing Bochdalek hernias are present. Minor linear  opacity in anterior right lower lobe is unchanged suggesting  atelectasis or scarring, with lung bases show emphysema.    Surgical clips in gallbladder fossa indicate cholecystectomy.  Noncontrast liver, spleen, and adrenals remain normal. Unchanged  cystic focus in pancreatic head measuring 20 mm again evident, along  course of the main pancreatic duct and common duct. Pancreatic  parenchyma otherwise normal on noncontrast imaging.    Bilateral nonobstructing renal calculi remain similar to prior exam.  Negative for hydronephrosis or ureteral calculi.    Fusiform infrarenal abdominal aortic  aneurysm reaches 4.5 x 4.2 cm in  diameter, not significantly changed. This tapers to normal caliber  proximal to the bifurcation, with diffuse aortoiliac calcification  again noted.    No intestinal abnormality identified. Appendix has been removed and is  not visualized. No free intraperitoneal gas.    Diffuse degenerative changes affect the spine.    CT PELVIS:  Visualized bladder appears normal. Uterus has been removed. No adnexal  mass. Noncontrast ovaries appear normal. Beam hardening artifact  related to right total hip arthroplasty and left proximal femur  surgical screws slightly limit evaluation of the pelvis. Left proximal  femur surgical hardware transfixes left femoral neck fracture.  Visualized surgical hardware shows no loosening or other complication.    IMPRESSION:    1. No acute findings throughout the abdomen and pelvis.  2. Unchanged emphysema.  3. Unchanged nonobstructing bilateral renal calculi.  4. Unchanged abdominal aortic aneurysm.    Electronically Signed by Juancho Navarro M.D. on 9/18/2020 6:19 PM                               Medical Decision Making:   Initial Assessment:   89-year-old female with a history of anemia, CHF, COPD, HLD, HTN, hypothyroidism presents to the ER with multiple complaints: diarrhea, groin pain, vaginal pain/discharge, dysuria, and vague pain.   ED Management:  Plan:  Afebrile, vital signs stable.  Labs, lipase.  CT AP to assess abdomen as well as pelvis/hips.  Will perform a vaginal exam.    Reassessed.  Patient lying in bed in no acute distress.  States she is feeling better, but continues to have some pain in her groin.  Family at bedside now.  Notes that she has been having these aches and pains for several weeks.  Her PCP had ultrasounds of her inguinal area of that were reportedly negative.  Family also notes that she was recently diagnosed with gout based on a blood test.  External vaginal exam was unremarkable.  No evidence of discharge.  Lab showed WBC  10.5.  BUN 79, creatinine 2.3, slightly up from previous.  Lipase 36.  Troponin less than 0.03.  UA shows RBC 4, WBC 55, bacteria negative.  CT AP shows no acute findings.  Unchanged abdominal aortic aneurysm.  Nonobstructing bilateral renal calculi.  Pelvis was noted to have right hip arthroplasty and left femur hardware.  No evidence of loosening or other complications.  Unclear cause of patient's aches and pains.  Possible osteoarthritis.  Do not suspect gout at this time.  No evidence of infection.  Possible enteritis causing her diarrhea.  No evidence of colitis or diverticulitis.  Possible UTI based on symptoms.  She has grown Citrobacter and Pseudomonas sensitive to Keflex in the past.  Will start on Keflex again.  Recommend follow-up with PCP for further evaluation.  Given return precautions.  Patient and family at bedside understand the plan.                             Clinical Impression:     ICD-10-CM ICD-9-CM   1. Urinary tract infection without hematuria, site unspecified  N39.0 599.0   2. Pain  R52 780.96   3. Diarrhea, unspecified type  R19.7 787.91   4. Pain of right hip joint  M25.551 719.45                          ED Disposition Condition    Discharge Stable        ED Prescriptions     Medication Sig Dispense Start Date End Date Auth. Provider    cephALEXin (KEFLEX) 500 MG capsule Take 1 capsule (500 mg total) by mouth 4 (four) times daily. for 5 days 20 capsule 9/18/2020 9/23/2020 Derrick Umaña MD        Follow-up Information     Follow up With Specialties Details Why Contact Info    Eli Edmonds MD Family Medicine Schedule an appointment as soon as possible for a visit  For further evaluation of your symptoms. [address] [facility] [address]  [phone]                                         Derrick Umaña MD  09/18/20 [id number]

## 2020-09-19 NOTE — ED NOTES
Discharged to home with granddaughter via wheelchair by ER tech. Discharged instructions reviewed; verbalized understanding. NAD noted.

## 2020-09-21 LAB — BACTERIA UR CULT: ABNORMAL

## 2020-09-25 ENCOUNTER — CLINICAL SUPPORT (OUTPATIENT)
Dept: CARDIOLOGY | Facility: CLINIC | Age: 85
End: 2020-09-25
Payer: MEDICARE

## 2020-09-25 DIAGNOSIS — Z95.818 PRESENCE OF OTHER CARDIAC IMPLANTS AND GRAFTS: ICD-10-CM

## 2020-09-25 PROCEDURE — 93298 CARDIAC DEVICE CHECK - REMOTE: ICD-10-PCS | Mod: S$GLB,,, | Performed by: INTERNAL MEDICINE

## 2020-09-25 PROCEDURE — 93298 REM INTERROG DEV EVAL SCRMS: CPT | Mod: S$GLB,,, | Performed by: INTERNAL MEDICINE

## 2020-09-30 ENCOUNTER — TELEPHONE (OUTPATIENT)
Dept: FAMILY MEDICINE | Facility: CLINIC | Age: 85
End: 2020-09-30

## 2020-09-30 NOTE — TELEPHONE ENCOUNTER
Current medication list updated as requested.           ----- Message from Edith Roach sent at 9/30/2020  2:49 PM CDT -----  Type: Needs Medical Advice    Who Called: Anabella with [employer]  Additional Information:  Stated she had discussion with patient/stated patient was told by Dr. Blum not to take medication: Glipizide due kidney function/stopped taking a month and half ago, patient has check blood sugar twice daily and she reports running around 107. (Please update med list)

## 2020-10-21 NOTE — PROGRESS NOTES
[facility] Hematology/Oncology  PROGRESS NOTE - Follow-up Visit        Subjective:       Patient ID:   NAME: Blaire Cage : 1930     89 y.o. female    Referring Doc: Sandro  Other Physicians: Cande Garg (PA); Eli Edmonds (PCP); Jeffrey Christopher (Pulm); Sandra (GI)    Chief Complaint:  DVT and PE f/u    History of Present Illness:     Patient is being seen today in person.       The patient is on today to go over the results of the recently ordered labs, tests and studies. She is here by herself.. She is breathing ok. She denies any swelling in the legs. She denies any CP, SOB, HA's. No excessive bleeding or bruising.      She has some chronic itching issues.     Discussed COVID19 precautions       ROS:   GEN: normal without any fever, night sweats or weight loss  HEENT: normal with no HA's, sore throat, stiff neck, changes in vision  CV: normal with no CP, SOB, PND, WEST or orthopnea  PULM: normal with no SOB, cough, hemoptysis, sputum or pleuritic pain  GI: some recent N/V, and diarrhea  : normal with no hematuria, dysuria  BREAST: normal with no mass, discharge, pain  SKIN: normal with no rash, erythema, bruising, or swelling    Allergies:  Review of patient's allergies indicates:   Allergen Reactions    Carvedilol Other (See Comments)     Bradycardia and syncope    Boniva [ibandronate]     Codeine     Hydralazine analogues     Iodinated contrast- oral and iv dye Hives    Kionex [sodium polystyrene sulfonate] Diarrhea     From encounter 17 for diarrhea--not true allergy.    Lisinopril     Morphine        Medications:    Current Outpatient Medications:     acetaminophen (TYLENOL) 325 MG tablet, Take 2 tablets (650 mg total) by mouth every 4 (four) hours as needed. (Patient taking differently: Take by mouth every 4 (four) hours as needed for Pain. ), Disp: , Rfl: 0    albuterol (PROVENTIL) 2.5 mg /3 mL (0.083 %) nebulizer solution, Take 2.5 mg by nebulization every 6 (six) hours as needed. ,  Disp: , Rfl:     albuterol-ipratropium (DUO-NEB) 2.5 mg-0.5 mg/3 mL nebulizer solution, Take 3 mLs by nebulization every 6 (six) hours as needed for Wheezing. Rescue, Disp: 120 vial, Rfl: 11    allopurinoL (ZYLOPRIM) 100 MG tablet, Take 100 mg by mouth once daily., Disp: , Rfl:     amLODIPine (NORVASC) 5 MG tablet, Take 1 tablet (5 mg total) by mouth 2 (two) times daily., Disp: 180 tablet, Rfl: 3    apixaban (ELIQUIS) 5 mg Tab, Take 1 tablet (5 mg total) by mouth 2 (two) times daily., Disp: 180 tablet, Rfl: 3    ascorbic acid (VITAMIN C) 500 MG tablet, Take 500 mg by mouth once daily.  , Disp: , Rfl:     aspirin (ECOTRIN) 81 MG EC tablet, Take 81 mg by mouth once daily.  , Disp: , Rfl:     calcium carbonate (OS-TASIA) 500 mg calcium (1,250 mg) tablet, Take 1 tablet by mouth 2 (two) times daily.  , Disp: , Rfl:     diclofenac sodium (VOLTAREN) 1 % Gel, Apply 2 g topically once daily., Disp: 100 g, Rfl: prn    docusate sodium (COLACE) 100 MG capsule, Take 1 capsule (100 mg total) by mouth 2 (two) times daily., Disp: 90 capsule, Rfl: 0    ferrous sulfate (FEOSOL) 325 mg (65 mg iron) Tab tablet, Take 1 tablet (325 mg total) by mouth 2 (two) times daily with meals., Disp: 180 tablet, Rfl: 3    fish oil-omega-3 fatty acids 300-1,000 mg capsule, Take 2 g by mouth every evening. , Disp: , Rfl:     fluocinolone (SYNALAR) 0.01 % external solution, Apply topically 2 (two) times daily., Disp: 90 mL, Rfl: 1    fluticasone (FLONASE) 50 mcg/actuation nasal spray, 2 sprays by Each Nare route once daily., Disp: 48 g, Rfl: 3    fluticasone-umeclidin-vilanter (TRELEGY ELLIPTA) 100-62.5-25 mcg DsDv, Inhale 1 puff into the lungs once daily., Disp: 60 each, Rfl: 11    folic acid-vit B6-vit B12 2.5-25-2 mg (FOLBIC) 2.5-25-2 mg Tab, Take 1 tablet by mouth once daily., Disp: 90 tablet, Rfl: 3    furosemide (LASIX) 20 MG tablet, Take 1 tablet only as needed, for swelling, increase SOB, weight gain of 3 lbs overnight or 5 lbs  for the week, Disp: 24 tablet, Rfl: 3    furosemide (LASIX) 40 MG tablet, Take 40 mg by mouth once daily., Disp: , Rfl:     gabapentin (NEURONTIN) 300 MG capsule, Take 1 capsule (300 mg total) by mouth every evening., Disp: 90 capsule, Rfl: 3    levothyroxine (SYNTHROID) 88 MCG tablet, Take 88 mcg by mouth once daily., Disp: , Rfl:     lidocaine (LIDODERM) 5 %, Place 1 patch onto the skin once daily. Remove & Discard patch within 12 hours or as directed by MD, Disp: 3 patch, Rfl: 0    linaCLOtide (LINZESS) 72 mcg Cap capsule, Take 1 capsule (72 mcg total) by mouth once daily., Disp: 90 capsule, Rfl: 3    magnesium oxide (MAG-OX) 400 mg (241.3 mg magnesium) tablet, TAKE 1 TABLET BY MOUTH ONCE DAILY (Patient taking differently: Take 400 mg by mouth once daily. ), Disp: 90 tablet, Rfl: 3    meclizine (ANTIVERT) 25 mg tablet, Take 1 tablet (25 mg total) by mouth 3 (three) times daily as needed., Disp: 20 tablet, Rfl: 0    montelukast (SINGULAIR) 10 mg tablet, TAKE 1 TABLET BY MOUTH ONCE DAILY IN THE EVENING (Patient taking differently: Take 10 mg by mouth every evening. ), Disp: 90 tablet, Rfl: 1    multivitamin (THERAGRAN) tablet, Take 1 tablet by mouth once daily., Disp: , Rfl:     mupirocin (BACTROBAN) 2 % ointment, Apply topically 2 (two) times daily., Disp: 30 g, Rfl: 1    nitroGLYCERIN (NITROSTAT) 0.4 MG SL tablet, Place 1 tablet (0.4 mg total) under the tongue every 5 (five) minutes as needed for Chest pain. As needed (Patient taking differently: Place 0.4 mg under the tongue every 5 (five) minutes as needed for Chest pain. Seek medical help if pain is not relieved after the third dose.), Disp: 30 tablet, Rfl: 3    omeprazole (PRILOSEC) 40 MG capsule, Take 1 capsule (40 mg total) by mouth once daily., Disp: 30 capsule, Rfl: 11    sertraline (ZOLOFT) 25 MG tablet, Take 1 tablet (25 mg total) by mouth once daily., Disp: 90 tablet, Rfl: 3    simvastatin (ZOCOR) 40 MG tablet, TAKE 1 TABLET BY MOUTH ONCE  DAILY IN THE EVENING (Patient taking differently: Take 40 mg by mouth every evening. TAKE 1 TABLET BY MOUTH ONCE DAILY IN THE EVENING), Disp: 90 tablet, Rfl: 3    vitamin D 1000 units Tab, Take 1 tablet (1,000 Units total) by mouth once daily., Disp: 90 tablet, Rfl: 3    PMHx/PSHx Updates:  See patient's last visit with me on 7/28/2020  See H&P on 6/29/2018        Pathology:  Cancer Staging  No matching staging information was found for the patient.          Objective:     Vitals:  Blood pressure (!) 123/52, pulse 73, temperature 97.2 °F (36.2 °C), resp. rate 18, weight 59.4 kg (131 lb).        Physical Examination:   GEN: no apparent distress, comfortable; AAOx3  HEAD: atraumatic and normocephalic  EYES: no conjunctival pallor or muddiness, no icterus; normal pupil reaction to ambient light  ENT: OMM, no pharyngeal erythema, external bilateral ears WNL; no visible thrush or ulcers  NECK: no masses or swelling, trachea midline, no visible LAD/LN's   CV: no palpitations; no pedal edema; no noticeable JVD or neck vein distension  CHEST: Normal respiratory effort; chest wall breath movements symmetrical; no audible wheezing  ABDOM: non-distended; no bloating  MUSC/Skeletal: ROM normal; joints visibly normal; no deformities or arthropathy  EXTREM: no clubbing, cyanosis, inflammation or swelling  SKIN: no rashes, lesions, ulcers, petechiae or subcutaneous nodules; vitiligo  : no carter  NEURO: moving all 4 extremities; AAOx3; no tremors  PSYCH: normal mood, affect and behavior  LYMPH: no visible LN's or LAD  LN's             Labs:          Lab Results   Component Value Date    WBC 10.51 09/18/2020    HGB 10.1 (L) 09/18/2020    HCT 30.5 (L) 09/18/2020    MCV 97 09/18/2020     09/18/2020            BMP  Lab Results   Component Value Date     (L) 09/18/2020    K 4.5 09/18/2020    CL 98 09/18/2020    CO2 23 09/18/2020    BUN 79 (H) 09/18/2020    CREATININE 2.3 (H) 09/18/2020    CALCIUM 9.1 09/18/2020     ANIONGAP 14 09/18/2020    ESTGFRAFRICA 20.9 (A) 09/18/2020    EGFRNONAA 18.2 (A) 09/18/2020        Lab Results   Component Value Date    IRON 102 06/11/2020    TIBC 337 06/11/2020    FERRITIN 156 06/11/2020     Lab Results   Component Value Date    ORQHMWKE63 >2000 (H) 05/07/2020     Lab Results   Component Value Date    FOLATE >40.0 (H) 05/07/2020           Radiology/Diagnostic Studies:    Ct Head Without Contrast    Result Date: 7/16/2018  EXAMINATION: CT HEAD WITHOUT CONTRAST CLINICAL HISTORY: Dizziness; TECHNIQUE: 5 mm noncontrast axial images were acquired through the head. COMPARISON: CT head without contrast-11/29/2014 FINDINGS: The brain is normally formed with preserved gray-white matter junction differentiation. No evidence of acute/recent major vascular territory cerebral infarction, parenchymal hemorrhage, or intra-axial mass.  There is age-appropriate cerebral volume loss with associated compensatory enlargement of the ventricular system and widening of the CSF spaces over both cerebral convexities.  There are confluent areas of periventricular white matter hypoattenuation compatible with age-appropriate chronic microvascular ischemic changes. No hydrocephalus.  No effacement of the skull-base cisterns.  No extra-axial fluid collections or blood products. The paranasal sinuses and mastoid air cells are clear.  There is rightward bony nasal septal deviation.  The visualized orbits are unremarkable.  The bony calvarium and visualized facial bones show no acute abnormality.     No acute intracranial abnormality appreciated.  No interval detrimental change in the imaging appearance of the brain when compared to the previous study. Electronically signed by: Aubrey Davis MD Date:    07/16/2018 Time:    08:01    Radiology Report    Result Date: 7/15/2018  Ordered by an unspecified provider.      I have reviewed all available lab results and radiology reports.    Assessment/Plan:   (1) 89 y.o. female with  diagnosis of a recent LLE DVT and Pulmonary emboli who has been referred by Dr Jo with Neph for evaluation by medical hematology/oncology. She was hospitalized [facility]. She has never had any clots before. No family hx/of clots.    - factor panel, protein C and S, ATIII adequate  - antiphosphatidyl negative; LA negative  - homocysteine elevated at 20.2  - MTHFR-C heterozygous positive - discussed the genetic implications with her and her daughter  - prothrombin II negative  - she is on eliquis bid      (2) COPD/asthma; former smoker     (3) Left ventricular dysfunction     (4) HTN and hypercholesterolemia     (5) CRI - stage III     (6) Anemia - most likely a multifactorial process with iron deficiency diagnosis, anemia of chronic disorders, anemia of chronic renal  - latest hgb is low  at  10.1 but better than last visit  - she is on iron and MVI  - iron panel and vitamins are adequate    (7) DM and hypothyroidism    (8) Prior hip fracture - left - needed pin placement only    (9) Vertigo - seen by ENT            Anemia of chronic disease    Anemia due to multiple mechanisms    Anemia, unspecified type    Iron deficiency anemia due to chronic blood loss    Macrocytic anemia    Anemia, chronic renal failure, stage 3 (moderate)    Normocytic normochromic anemia    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 Continue Folbic for the hyperhomocysteinemia  2. Continue eliquis for now and we may need to discontinue it if her anemia does not stabilize  3. Previously  discussed the genetic implications of the MTHFR-C in siblings and children  4. F/u with PCP, GI, neph etc  5.  monitor labs monthly for now (encouraged compliance)  6.  Recommend that nephrology consider starting her on procrit    RTC in  3-4 months  Fax note to Eli Edmonds MD; Reva Jo    Discussion:       Total Time spent on patient:    I spent over 15 mins of time with  the patient. Reviewed results of the recently ordered labs, tests, reports and studies; made directives with regards to the results. Over half of this time was spent couseling and coordinating care, making treatment and analytical decisions; ordering necessary labs, tests and studies; and discussing treatment options and setting up treatment plan(s) if indicated.        COVID-19 Discussion:    I had long discussion with patient and any applicable family about the COVID-19 coronavirus epidemic and the recommended precautions with regard to cancer and/or hematology patients. I have re-iterated the CDC recommendations for adequate hand washing, use of hand -like products, and coughing into elbow, etc. In addition, especially for our patients who are on chemotherapy and/or our otherwise immunocompromised patients, I have recommended avoidance of crowds, including movie theaters, restaurants, churches, etc. I have recommended avoidance of any sick or symptomatic family members and/or friends. I have also recommended avoidance of any raw and unwashed food products, and general avoidance of food items that have not been prepared by themselves. The patient has been asked to call us immediately with any symptom developments, issues, questions or other general concerns.       Anticoagulation Discussion:    I had long discussion with patient and any applicable family members about the benefit and/or need for anticoagulation. I communicated about the risks of bleeding while on any anticoagulation, which could be serious and/or life-threatening, and which can occur at any time, regardless of degree of the level of anticoagulation. I expressed the need for compliance with any anticoagulation regimen and that failure to do so could potential lead to excessive bleeding, and risk to health and/or life. In particular, with patients on coumadin therapy, compliance with requested blood work is absolutely essential, as coumadin  levels can vary from time to time, and failure to do so could potentially place the patient at risk for bleeding and/or clotting events which could be fatal. Patients on coumadin are encouraged to call the day after they have their levels drawn, as to obtain the appropriate instructions from my staff. Patients are aware that self-regulating or self-dosing of their medications is strictly prohibited.       I have explained all of the above in detail and the patient understands all of the current recommendation(s). I have answered all of their questions to the best of my ability and to their complete satisfaction.   The patient is to continue with the current management plan.            Electronically signed by Issac Alvarez MD                 Answers for HPI/ROS submitted by the patient on 10/19/2020   appetite change : No  unexpected weight change: No  mouth sores: No  visual disturbance: No  cough: No  shortness of breath: No  chest pain: No  abdominal pain: No  diarrhea: No  frequency: No  back pain: No  rash: No  headaches: No  adenopathy: No  nervous/ anxious: No

## 2020-10-22 ENCOUNTER — OFFICE VISIT (OUTPATIENT)
Dept: HEMATOLOGY/ONCOLOGY | Facility: CLINIC | Age: 85
End: 2020-10-22
Payer: MEDICARE

## 2020-10-22 VITALS
DIASTOLIC BLOOD PRESSURE: 52 MMHG | TEMPERATURE: 97 F | RESPIRATION RATE: 18 BRPM | HEART RATE: 73 BPM | SYSTOLIC BLOOD PRESSURE: 123 MMHG | BODY MASS INDEX: 21.14 KG/M2 | WEIGHT: 131 LBS

## 2020-10-22 DIAGNOSIS — D64.9 NORMOCYTIC NORMOCHROMIC ANEMIA: ICD-10-CM

## 2020-10-22 DIAGNOSIS — D63.1 ANEMIA, CHRONIC RENAL FAILURE, STAGE 3 (MODERATE): ICD-10-CM

## 2020-10-22 DIAGNOSIS — F17.200 SMOKER: ICD-10-CM

## 2020-10-22 DIAGNOSIS — R79.83 HOMOCYSTEINEMIA: ICD-10-CM

## 2020-10-22 DIAGNOSIS — D53.9 MACROCYTIC ANEMIA: ICD-10-CM

## 2020-10-22 DIAGNOSIS — D63.8 ANEMIA OF CHRONIC DISEASE: Primary | ICD-10-CM

## 2020-10-22 DIAGNOSIS — D64.89 ANEMIA DUE TO MULTIPLE MECHANISMS: ICD-10-CM

## 2020-10-22 DIAGNOSIS — N18.30 ANEMIA, CHRONIC RENAL FAILURE, STAGE 3 (MODERATE): ICD-10-CM

## 2020-10-22 DIAGNOSIS — Z15.89 HETEROZYGOUS MTHFR MUTATION C677T: ICD-10-CM

## 2020-10-22 DIAGNOSIS — D50.0 IRON DEFICIENCY ANEMIA DUE TO CHRONIC BLOOD LOSS: ICD-10-CM

## 2020-10-22 DIAGNOSIS — D64.9 ANEMIA, UNSPECIFIED TYPE: ICD-10-CM

## 2020-10-22 PROCEDURE — 1126F PR PAIN SEVERITY QUANTIFIED, NO PAIN PRESENT: ICD-10-PCS | Mod: S$GLB,,, | Performed by: INTERNAL MEDICINE

## 2020-10-22 PROCEDURE — 1126F AMNT PAIN NOTED NONE PRSNT: CPT | Mod: S$GLB,,, | Performed by: INTERNAL MEDICINE

## 2020-10-22 PROCEDURE — 99213 PR OFFICE/OUTPT VISIT, EST, LEVL III, 20-29 MIN: ICD-10-PCS | Mod: S$GLB,,, | Performed by: INTERNAL MEDICINE

## 2020-10-22 PROCEDURE — 99213 OFFICE O/P EST LOW 20 MIN: CPT | Mod: S$GLB,,, | Performed by: INTERNAL MEDICINE

## 2020-10-22 PROCEDURE — 1159F PR MEDICATION LIST DOCUMENTED IN MEDICAL RECORD: ICD-10-PCS | Mod: S$GLB,,, | Performed by: INTERNAL MEDICINE

## 2020-10-22 PROCEDURE — 1101F PT FALLS ASSESS-DOCD LE1/YR: CPT | Mod: S$GLB,,, | Performed by: INTERNAL MEDICINE

## 2020-10-22 PROCEDURE — 1159F MED LIST DOCD IN RCRD: CPT | Mod: S$GLB,,, | Performed by: INTERNAL MEDICINE

## 2020-10-22 PROCEDURE — 1101F PR PT FALLS ASSESS DOC 0-1 FALLS W/OUT INJ PAST YR: ICD-10-PCS | Mod: S$GLB,,, | Performed by: INTERNAL MEDICINE

## 2020-10-25 ENCOUNTER — HOSPITAL ENCOUNTER (INPATIENT)
Facility: HOSPITAL | Age: 85
LOS: 1 days | Discharge: HOME | DRG: 378 | End: 2020-10-29
Attending: EMERGENCY MEDICINE | Admitting: INTERNAL MEDICINE
Payer: MEDICARE

## 2020-10-25 ENCOUNTER — CLINICAL SUPPORT (OUTPATIENT)
Dept: CARDIOLOGY | Facility: CLINIC | Age: 85
End: 2020-10-25
Payer: MEDICARE

## 2020-10-25 DIAGNOSIS — E86.0 DEHYDRATION: ICD-10-CM

## 2020-10-25 DIAGNOSIS — K92.2 GASTROINTESTINAL HEMORRHAGE, UNSPECIFIED GASTROINTESTINAL HEMORRHAGE TYPE: Primary | ICD-10-CM

## 2020-10-25 DIAGNOSIS — R19.7 DIARRHEA, UNSPECIFIED TYPE: ICD-10-CM

## 2020-10-25 DIAGNOSIS — K52.9 ACUTE COLITIS: ICD-10-CM

## 2020-10-25 DIAGNOSIS — D64.9 CHRONIC ANEMIA: ICD-10-CM

## 2020-10-25 DIAGNOSIS — Z95.818 PRESENCE OF OTHER CARDIAC IMPLANTS AND GRAFTS: ICD-10-CM

## 2020-10-25 DIAGNOSIS — R11.0 NAUSEA: ICD-10-CM

## 2020-10-25 PROBLEM — R00.1 BRADYCARDIA: Status: RESOLVED | Noted: 2018-10-03 | Resolved: 2020-10-25

## 2020-10-25 LAB
ALBUMIN SERPL BCP-MCNC: 3.9 G/DL (ref 3.5–5.2)
ALP SERPL-CCNC: 65 U/L (ref 55–135)
ALT SERPL W/O P-5'-P-CCNC: 20 U/L (ref 10–44)
ANION GAP SERPL CALC-SCNC: 11 MMOL/L (ref 8–16)
AST SERPL-CCNC: 26 U/L (ref 10–40)
BASOPHILS # BLD AUTO: 0.02 K/UL (ref 0–0.2)
BASOPHILS NFR BLD: 0.2 % (ref 0–1.9)
BILIRUB SERPL-MCNC: 0.6 MG/DL (ref 0.1–1)
BNP SERPL-MCNC: 166 PG/ML (ref 0–99)
BUN SERPL-MCNC: 82 MG/DL (ref 8–23)
CALCIUM SERPL-MCNC: 8.9 MG/DL (ref 8.7–10.5)
CHLORIDE SERPL-SCNC: 101 MMOL/L (ref 95–110)
CO2 SERPL-SCNC: 24 MMOL/L (ref 23–29)
CREAT SERPL-MCNC: 2.6 MG/DL (ref 0.5–1.4)
DIFFERENTIAL METHOD: ABNORMAL
EOSINOPHIL # BLD AUTO: 0.9 K/UL (ref 0–0.5)
EOSINOPHIL NFR BLD: 7.1 % (ref 0–8)
ERYTHROCYTE [DISTWIDTH] IN BLOOD BY AUTOMATED COUNT: 13.4 % (ref 11.5–14.5)
EST. GFR  (AFRICAN AMERICAN): 18.1 ML/MIN/1.73 M^2
EST. GFR  (NON AFRICAN AMERICAN): 15.7 ML/MIN/1.73 M^2
GLUCOSE SERPL-MCNC: 137 MG/DL (ref 70–110)
HCT VFR BLD AUTO: 29.1 % (ref 37–48.5)
HGB BLD-MCNC: 9.6 G/DL (ref 12–16)
IMM GRANULOCYTES # BLD AUTO: 0.08 K/UL (ref 0–0.04)
IMM GRANULOCYTES NFR BLD AUTO: 0.7 % (ref 0–0.5)
INR PPP: 1.4
LACTATE SERPL-SCNC: 1 MMOL/L (ref 0.5–1.9)
LIPASE SERPL-CCNC: 42 U/L (ref 4–60)
LYMPHOCYTES # BLD AUTO: 0.9 K/UL (ref 1–4.8)
LYMPHOCYTES NFR BLD: 7.9 % (ref 18–48)
MCH RBC QN AUTO: 32.5 PG (ref 27–31)
MCHC RBC AUTO-ENTMCNC: 33 G/DL (ref 32–36)
MCV RBC AUTO: 99 FL (ref 82–98)
MONOCYTES # BLD AUTO: 0.9 K/UL (ref 0.3–1)
MONOCYTES NFR BLD: 7.4 % (ref 4–15)
NEUTROPHILS # BLD AUTO: 9.2 K/UL (ref 1.8–7.7)
NEUTROPHILS NFR BLD: 76.7 % (ref 38–73)
NRBC BLD-RTO: 0 /100 WBC
OB PNL STL: POSITIVE
PLATELET # BLD AUTO: 171 K/UL (ref 150–350)
PMV BLD AUTO: 11.2 FL (ref 9.2–12.9)
POTASSIUM SERPL-SCNC: 4.1 MMOL/L (ref 3.5–5.1)
PROT SERPL-MCNC: 6.5 G/DL (ref 6–8.4)
PROTHROMBIN TIME: 16.1 SEC (ref 10.6–14.8)
RBC # BLD AUTO: 2.95 M/UL (ref 4–5.4)
SARS-COV-2 RDRP RESP QL NAA+PROBE: NEGATIVE
SODIUM SERPL-SCNC: 136 MMOL/L (ref 136–145)
TROPONIN I SERPL DL<=0.01 NG/ML-MCNC: <0.03 NG/ML
WBC # BLD AUTO: 11.95 K/UL (ref 3.9–12.7)

## 2020-10-25 PROCEDURE — 93010 EKG 12-LEAD: ICD-10-PCS | Mod: ,,, | Performed by: SPECIALIST

## 2020-10-25 PROCEDURE — 93005 ELECTROCARDIOGRAM TRACING: CPT | Performed by: SPECIALIST

## 2020-10-25 PROCEDURE — G0378 HOSPITAL OBSERVATION PER HR: HCPCS

## 2020-10-25 PROCEDURE — 93298 REM INTERROG DEV EVAL SCRMS: CPT | Mod: S$GLB,,, | Performed by: INTERNAL MEDICINE

## 2020-10-25 PROCEDURE — 93010 ELECTROCARDIOGRAM REPORT: CPT | Mod: ,,, | Performed by: SPECIALIST

## 2020-10-25 PROCEDURE — 99285 EMERGENCY DEPT VISIT HI MDM: CPT | Mod: 25

## 2020-10-25 PROCEDURE — S0030 INJECTION, METRONIDAZOLE: HCPCS | Performed by: NURSE PRACTITIONER

## 2020-10-25 PROCEDURE — U0002 COVID-19 LAB TEST NON-CDC: HCPCS

## 2020-10-25 PROCEDURE — 63600175 PHARM REV CODE 636 W HCPCS: Performed by: EMERGENCY MEDICINE

## 2020-10-25 PROCEDURE — 25000003 PHARM REV CODE 250: Performed by: NURSE PRACTITIONER

## 2020-10-25 PROCEDURE — C9113 INJ PANTOPRAZOLE SODIUM, VIA: HCPCS | Performed by: NURSE PRACTITIONER

## 2020-10-25 PROCEDURE — 93298 CARDIAC DEVICE CHECK - REMOTE: ICD-10-PCS | Mod: S$GLB,,, | Performed by: INTERNAL MEDICINE

## 2020-10-25 PROCEDURE — 36415 COLL VENOUS BLD VENIPUNCTURE: CPT

## 2020-10-25 PROCEDURE — 83605 ASSAY OF LACTIC ACID: CPT

## 2020-10-25 PROCEDURE — 85025 COMPLETE CBC W/AUTO DIFF WBC: CPT

## 2020-10-25 PROCEDURE — 63600175 PHARM REV CODE 636 W HCPCS: Performed by: NURSE PRACTITIONER

## 2020-10-25 PROCEDURE — 82272 OCCULT BLD FECES 1-3 TESTS: CPT

## 2020-10-25 PROCEDURE — 80053 COMPREHEN METABOLIC PANEL: CPT

## 2020-10-25 PROCEDURE — 83880 ASSAY OF NATRIURETIC PEPTIDE: CPT

## 2020-10-25 PROCEDURE — 85610 PROTHROMBIN TIME: CPT

## 2020-10-25 PROCEDURE — 96374 THER/PROPH/DIAG INJ IV PUSH: CPT

## 2020-10-25 PROCEDURE — 84484 ASSAY OF TROPONIN QUANT: CPT

## 2020-10-25 PROCEDURE — 83690 ASSAY OF LIPASE: CPT

## 2020-10-25 PROCEDURE — 87040 BLOOD CULTURE FOR BACTERIA: CPT | Mod: 59

## 2020-10-25 PROCEDURE — 25000003 PHARM REV CODE 250: Performed by: EMERGENCY MEDICINE

## 2020-10-25 RX ORDER — SERTRALINE HYDROCHLORIDE 25 MG/1
25 TABLET, FILM COATED ORAL DAILY
Status: DISCONTINUED | OUTPATIENT
Start: 2020-10-26 | End: 2020-10-29 | Stop reason: HOSPADM

## 2020-10-25 RX ORDER — POLYETHYLENE GLYCOL 3350 17 G/17G
17 POWDER, FOR SOLUTION ORAL 2 TIMES DAILY PRN
Status: DISCONTINUED | OUTPATIENT
Start: 2020-10-25 | End: 2020-10-29 | Stop reason: HOSPADM

## 2020-10-25 RX ORDER — LEVOTHYROXINE SODIUM 88 UG/1
88 TABLET ORAL
Status: DISCONTINUED | OUTPATIENT
Start: 2020-10-26 | End: 2020-10-29 | Stop reason: HOSPADM

## 2020-10-25 RX ORDER — FERROUS SULFATE 325(65) MG
325 TABLET ORAL 2 TIMES DAILY WITH MEALS
Status: DISCONTINUED | OUTPATIENT
Start: 2020-10-26 | End: 2020-10-29 | Stop reason: HOSPADM

## 2020-10-25 RX ORDER — SODIUM CHLORIDE 0.9 % (FLUSH) 0.9 %
10 SYRINGE (ML) INJECTION
Status: DISCONTINUED | OUTPATIENT
Start: 2020-10-25 | End: 2020-10-29 | Stop reason: HOSPADM

## 2020-10-25 RX ORDER — SODIUM CHLORIDE 9 MG/ML
1000 INJECTION, SOLUTION INTRAVENOUS
Status: COMPLETED | OUTPATIENT
Start: 2020-10-25 | End: 2020-10-25

## 2020-10-25 RX ORDER — METRONIDAZOLE 500 MG/100ML
500 INJECTION, SOLUTION INTRAVENOUS
Status: DISCONTINUED | OUTPATIENT
Start: 2020-10-26 | End: 2020-10-29

## 2020-10-25 RX ORDER — ACETAMINOPHEN 325 MG/1
650 TABLET ORAL EVERY 4 HOURS PRN
Status: DISCONTINUED | OUTPATIENT
Start: 2020-10-25 | End: 2020-10-29 | Stop reason: HOSPADM

## 2020-10-25 RX ORDER — MONTELUKAST SODIUM 10 MG/1
10 TABLET ORAL NIGHTLY
Status: DISCONTINUED | OUTPATIENT
Start: 2020-10-26 | End: 2020-10-29 | Stop reason: HOSPADM

## 2020-10-25 RX ORDER — LANOLIN ALCOHOL/MO/W.PET/CERES
400 CREAM (GRAM) TOPICAL DAILY
Status: DISCONTINUED | OUTPATIENT
Start: 2020-10-26 | End: 2020-10-29 | Stop reason: HOSPADM

## 2020-10-25 RX ORDER — AMLODIPINE BESYLATE 5 MG/1
5 TABLET ORAL 2 TIMES DAILY
Status: DISCONTINUED | OUTPATIENT
Start: 2020-10-26 | End: 2020-10-29 | Stop reason: HOSPADM

## 2020-10-25 RX ORDER — ONDANSETRON 2 MG/ML
4 INJECTION INTRAMUSCULAR; INTRAVENOUS
Status: COMPLETED | OUTPATIENT
Start: 2020-10-25 | End: 2020-10-25

## 2020-10-25 RX ORDER — SIMVASTATIN 40 MG/1
40 TABLET, FILM COATED ORAL NIGHTLY
Status: DISCONTINUED | OUTPATIENT
Start: 2020-10-26 | End: 2020-10-29 | Stop reason: HOSPADM

## 2020-10-25 RX ORDER — ALLOPURINOL 100 MG/1
100 TABLET ORAL DAILY
Status: DISCONTINUED | OUTPATIENT
Start: 2020-10-26 | End: 2020-10-29 | Stop reason: HOSPADM

## 2020-10-25 RX ORDER — FLUTICASONE PROPIONATE 50 MCG
2 SPRAY, SUSPENSION (ML) NASAL DAILY
Status: DISCONTINUED | OUTPATIENT
Start: 2020-10-26 | End: 2020-10-29 | Stop reason: HOSPADM

## 2020-10-25 RX ORDER — GABAPENTIN 300 MG/1
300 CAPSULE ORAL NIGHTLY
Status: DISCONTINUED | OUTPATIENT
Start: 2020-10-26 | End: 2020-10-29 | Stop reason: HOSPADM

## 2020-10-25 RX ORDER — ALBUTEROL SULFATE 0.83 MG/ML
2.5 SOLUTION RESPIRATORY (INHALATION) EVERY 6 HOURS PRN
Status: DISCONTINUED | OUTPATIENT
Start: 2020-10-25 | End: 2020-10-29 | Stop reason: HOSPADM

## 2020-10-25 RX ORDER — TALC
6 POWDER (GRAM) TOPICAL NIGHTLY PRN
Status: DISCONTINUED | OUTPATIENT
Start: 2020-10-25 | End: 2020-10-29 | Stop reason: HOSPADM

## 2020-10-25 RX ORDER — ONDANSETRON 2 MG/ML
4 INJECTION INTRAMUSCULAR; INTRAVENOUS EVERY 8 HOURS PRN
Status: DISCONTINUED | OUTPATIENT
Start: 2020-10-25 | End: 2020-10-29 | Stop reason: HOSPADM

## 2020-10-25 RX ADMIN — METRONIDAZOLE 500 MG: 500 INJECTION, SOLUTION INTRAVENOUS at 11:10

## 2020-10-25 RX ADMIN — ONDANSETRON 4 MG: 2 INJECTION INTRAMUSCULAR; INTRAVENOUS at 08:10

## 2020-10-25 RX ADMIN — GABAPENTIN 300 MG: 300 CAPSULE ORAL at 11:10

## 2020-10-25 RX ADMIN — AMLODIPINE BESYLATE 5 MG: 5 TABLET ORAL at 11:10

## 2020-10-25 RX ADMIN — DEXTROSE 8 MG/HR: 50 INJECTION, SOLUTION INTRAVENOUS at 10:10

## 2020-10-25 RX ADMIN — MONTELUKAST 10 MG: 10 TABLET, FILM COATED ORAL at 11:10

## 2020-10-25 RX ADMIN — SIMVASTATIN 40 MG: 40 TABLET, FILM COATED ORAL at 11:10

## 2020-10-25 RX ADMIN — SODIUM CHLORIDE 1000 ML: 0.9 INJECTION, SOLUTION INTRAVENOUS at 09:10

## 2020-10-26 ENCOUNTER — ANESTHESIA EVENT (OUTPATIENT)
Dept: SURGERY | Facility: HOSPITAL | Age: 85
DRG: 378 | End: 2020-10-26
Payer: MEDICARE

## 2020-10-26 ENCOUNTER — ANESTHESIA (OUTPATIENT)
Dept: SURGERY | Facility: HOSPITAL | Age: 85
DRG: 378 | End: 2020-10-26
Payer: MEDICARE

## 2020-10-26 LAB
ABO + RH BLD: NORMAL
ANION GAP SERPL CALC-SCNC: 11 MMOL/L (ref 8–16)
BACTERIA #/AREA URNS HPF: NEGATIVE /HPF
BASOPHILS # BLD AUTO: 0.02 K/UL (ref 0–0.2)
BASOPHILS NFR BLD: 0.2 % (ref 0–1.9)
BILIRUB UR QL STRIP: NEGATIVE
BLD GP AB SCN CELLS X3 SERPL QL: NORMAL
BLOOD GROUP ANTIBODIES SERPL: NORMAL
BUN SERPL-MCNC: 78 MG/DL (ref 8–23)
CALCIUM SERPL-MCNC: 8.4 MG/DL (ref 8.7–10.5)
CHLORIDE SERPL-SCNC: 101 MMOL/L (ref 95–110)
CLARITY UR: CLEAR
CO2 SERPL-SCNC: 23 MMOL/L (ref 23–29)
COLOR UR: YELLOW
CREAT SERPL-MCNC: 2.6 MG/DL (ref 0.5–1.4)
DIFFERENTIAL METHOD: ABNORMAL
EOSINOPHIL # BLD AUTO: 0.4 K/UL (ref 0–0.5)
EOSINOPHIL NFR BLD: 3.4 % (ref 0–8)
ERYTHROCYTE [DISTWIDTH] IN BLOOD BY AUTOMATED COUNT: 13.6 % (ref 11.5–14.5)
EST. GFR  (AFRICAN AMERICAN): 18.1 ML/MIN/1.73 M^2
EST. GFR  (NON AFRICAN AMERICAN): 15.7 ML/MIN/1.73 M^2
GLUCOSE SERPL-MCNC: 136 MG/DL (ref 70–110)
GLUCOSE UR QL STRIP: NEGATIVE
HCT VFR BLD AUTO: 27.5 % (ref 37–48.5)
HGB BLD-MCNC: 9 G/DL (ref 12–16)
HGB UR QL STRIP: ABNORMAL
HYALINE CASTS #/AREA URNS LPF: 1 /LPF
IMM GRANULOCYTES # BLD AUTO: 0.05 K/UL (ref 0–0.04)
IMM GRANULOCYTES NFR BLD AUTO: 0.5 % (ref 0–0.5)
KETONES UR QL STRIP: NEGATIVE
LEUKOCYTE ESTERASE UR QL STRIP: ABNORMAL
LYMPHOCYTES # BLD AUTO: 0.8 K/UL (ref 1–4.8)
LYMPHOCYTES NFR BLD: 7.8 % (ref 18–48)
MAGNESIUM SERPL-MCNC: 2.3 MG/DL (ref 1.6–2.6)
MCH RBC QN AUTO: 32.5 PG (ref 27–31)
MCHC RBC AUTO-ENTMCNC: 32.7 G/DL (ref 32–36)
MCV RBC AUTO: 99 FL (ref 82–98)
MICROSCOPIC COMMENT: ABNORMAL
MONOCYTES # BLD AUTO: 0.7 K/UL (ref 0.3–1)
MONOCYTES NFR BLD: 6.7 % (ref 4–15)
NEUTROPHILS # BLD AUTO: 8.7 K/UL (ref 1.8–7.7)
NEUTROPHILS NFR BLD: 81.4 % (ref 38–73)
NITRITE UR QL STRIP: NEGATIVE
NRBC BLD-RTO: 0 /100 WBC
PH UR STRIP: 6 [PH] (ref 5–8)
PLATELET # BLD AUTO: 158 K/UL (ref 150–350)
PMV BLD AUTO: 11.7 FL (ref 9.2–12.9)
POTASSIUM SERPL-SCNC: 4.6 MMOL/L (ref 3.5–5.1)
PROT UR QL STRIP: ABNORMAL
RBC # BLD AUTO: 2.77 M/UL (ref 4–5.4)
RBC #/AREA URNS HPF: 2 /HPF (ref 0–4)
SODIUM SERPL-SCNC: 135 MMOL/L (ref 136–145)
SP GR UR STRIP: 1.01 (ref 1–1.03)
SQUAMOUS #/AREA URNS HPF: 2 /HPF
URN SPEC COLLECT METH UR: ABNORMAL
UROBILINOGEN UR STRIP-ACNC: NEGATIVE EU/DL
WBC # BLD AUTO: 10.63 K/UL (ref 3.9–12.7)
WBC #/AREA URNS HPF: 7 /HPF (ref 0–5)

## 2020-10-26 PROCEDURE — 25000003 PHARM REV CODE 250: Performed by: NURSE PRACTITIONER

## 2020-10-26 PROCEDURE — S0030 INJECTION, METRONIDAZOLE: HCPCS | Performed by: NURSE PRACTITIONER

## 2020-10-26 PROCEDURE — 81001 URINALYSIS AUTO W/SCOPE: CPT

## 2020-10-26 PROCEDURE — 27000675 HC TUBING MICRODRIP: Performed by: NURSE ANESTHETIST, CERTIFIED REGISTERED

## 2020-10-26 PROCEDURE — 86901 BLOOD TYPING SEROLOGIC RH(D): CPT

## 2020-10-26 PROCEDURE — 94761 N-INVAS EAR/PLS OXIMETRY MLT: CPT

## 2020-10-26 PROCEDURE — 80048 BASIC METABOLIC PNL TOTAL CA: CPT

## 2020-10-26 PROCEDURE — 27000671 HC TUBING MICROBORE EXT: Performed by: NURSE ANESTHETIST, CERTIFIED REGISTERED

## 2020-10-26 PROCEDURE — 63600175 PHARM REV CODE 636 W HCPCS: Performed by: NURSE ANESTHETIST, CERTIFIED REGISTERED

## 2020-10-26 PROCEDURE — 25000003 PHARM REV CODE 250: Performed by: NURSE ANESTHETIST, CERTIFIED REGISTERED

## 2020-10-26 PROCEDURE — 37000008 HC ANESTHESIA 1ST 15 MINUTES: Performed by: INTERNAL MEDICINE

## 2020-10-26 PROCEDURE — C9113 INJ PANTOPRAZOLE SODIUM, VIA: HCPCS | Performed by: NURSE PRACTITIONER

## 2020-10-26 PROCEDURE — 85025 COMPLETE CBC W/AUTO DIFF WBC: CPT

## 2020-10-26 PROCEDURE — 37000009 HC ANESTHESIA EA ADD 15 MINS: Performed by: INTERNAL MEDICINE

## 2020-10-26 PROCEDURE — G0378 HOSPITAL OBSERVATION PER HR: HCPCS

## 2020-10-26 PROCEDURE — 27000284 HC CANNULA NASAL: Performed by: NURSE ANESTHETIST, CERTIFIED REGISTERED

## 2020-10-26 PROCEDURE — 86870 RBC ANTIBODY IDENTIFICATION: CPT

## 2020-10-26 PROCEDURE — 27202103: Performed by: NURSE ANESTHETIST, CERTIFIED REGISTERED

## 2020-10-26 PROCEDURE — 25000242 PHARM REV CODE 250 ALT 637 W/ HCPCS: Performed by: NURSE PRACTITIONER

## 2020-10-26 PROCEDURE — 83735 ASSAY OF MAGNESIUM: CPT

## 2020-10-26 PROCEDURE — 63600175 PHARM REV CODE 636 W HCPCS: Performed by: NURSE PRACTITIONER

## 2020-10-26 PROCEDURE — 43235 EGD DIAGNOSTIC BRUSH WASH: CPT | Performed by: INTERNAL MEDICINE

## 2020-10-26 PROCEDURE — 36415 COLL VENOUS BLD VENIPUNCTURE: CPT

## 2020-10-26 RX ORDER — PROPOFOL 10 MG/ML
VIAL (ML) INTRAVENOUS
Status: DISCONTINUED | OUTPATIENT
Start: 2020-10-26 | End: 2020-10-26

## 2020-10-26 RX ORDER — SODIUM CHLORIDE 9 MG/ML
INJECTION, SOLUTION INTRAVENOUS CONTINUOUS PRN
Status: DISCONTINUED | OUTPATIENT
Start: 2020-10-26 | End: 2020-10-26

## 2020-10-26 RX ADMIN — DEXTROSE 8 MG/HR: 50 INJECTION, SOLUTION INTRAVENOUS at 04:10

## 2020-10-26 RX ADMIN — ALLOPURINOL 100 MG: 100 TABLET ORAL at 10:10

## 2020-10-26 RX ADMIN — SERTRALINE HYDROCHLORIDE 25 MG: 25 TABLET ORAL at 10:10

## 2020-10-26 RX ADMIN — GABAPENTIN 300 MG: 300 CAPSULE ORAL at 08:10

## 2020-10-26 RX ADMIN — DEXTROSE 8 MG/HR: 50 INJECTION, SOLUTION INTRAVENOUS at 10:10

## 2020-10-26 RX ADMIN — SIMVASTATIN 40 MG: 40 TABLET, FILM COATED ORAL at 08:10

## 2020-10-26 RX ADMIN — DEXTROSE 8 MG/HR: 50 INJECTION, SOLUTION INTRAVENOUS at 09:10

## 2020-10-26 RX ADMIN — AMLODIPINE BESYLATE 5 MG: 5 TABLET ORAL at 10:10

## 2020-10-26 RX ADMIN — THERA TABS 1 TABLET: TAB at 10:10

## 2020-10-26 RX ADMIN — MAGNESIUM OXIDE 400 MG: 400 TABLET ORAL at 10:10

## 2020-10-26 RX ADMIN — METRONIDAZOLE 500 MG: 500 INJECTION, SOLUTION INTRAVENOUS at 06:10

## 2020-10-26 RX ADMIN — AMLODIPINE BESYLATE 5 MG: 5 TABLET ORAL at 08:10

## 2020-10-26 RX ADMIN — PROPOFOL 30 MG: 10 INJECTION, EMULSION INTRAVENOUS at 09:10

## 2020-10-26 RX ADMIN — MONTELUKAST 10 MG: 10 TABLET, FILM COATED ORAL at 08:10

## 2020-10-26 RX ADMIN — PROPOFOL 30 MG: 10 INJECTION, EMULSION INTRAVENOUS at 08:10

## 2020-10-26 RX ADMIN — FERROUS SULFATE TAB 325 MG (65 MG ELEMENTAL FE) 325 MG: 325 (65 FE) TAB at 05:10

## 2020-10-26 RX ADMIN — MELATONIN 6 MG: at 08:10

## 2020-10-26 RX ADMIN — FERROUS SULFATE TAB 325 MG (65 MG ELEMENTAL FE) 325 MG: 325 (65 FE) TAB at 10:10

## 2020-10-26 RX ADMIN — LEVOTHYROXINE SODIUM 88 MCG: 88 TABLET ORAL at 06:10

## 2020-10-26 RX ADMIN — METRONIDAZOLE 500 MG: 500 INJECTION, SOLUTION INTRAVENOUS at 10:10

## 2020-10-26 RX ADMIN — SODIUM CHLORIDE: 0.9 INJECTION, SOLUTION INTRAVENOUS at 08:10

## 2020-10-26 RX ADMIN — FLUTICASONE PROPIONATE 100 MCG: 50 SPRAY, METERED NASAL at 11:10

## 2020-10-26 NOTE — PLAN OF CARE
Pt lives alone and uses a rollator, pt states she has DTRS to help her and she wi8oll have no discharge needs    PCP= mary kate bravo  Rx= [facility]       10/26/20 1606   Discharge Assessment   Assessment Type Discharge Planning Assessment   Confirmed/corrected address and phone number on facesheet? Yes   Assessment information obtained from? Patient;Medical Record   Communicated expected length of stay with patient/caregiver no   Prior to hospitilization cognitive status: Alert/Oriented   Prior to hospitalization functional status: Assistive Equipment   Current cognitive status: Alert/Oriented   Current Functional Status: Assistive Equipment   Facility Arrived From: home   Lives With alone   Able to Return to Prior Arrangements yes   Is patient able to care for self after discharge? Yes   Who are your caregiver(s) and their phone number(s)? uche pierce [phone] Ilana hammer [phone]   Patient's perception of discharge disposition home or selfcare   Readmission Within the Last 30 Days no previous admission in last 30 days   Patient currently being followed by outpatient case management? No   Patient currently receives any other outside agency services? No   Equipment Currently Used at Home nebulizer;oxygen;rollator;shower chair   Do you have any problems affording any of your prescribed medications? No   Is the patient taking medications as prescribed? yes   Does the patient have transportation home? Yes   Transportation Anticipated family or friend will provide   Dialysis Name and Scheduled days n/a   Does the patient receive services at the [facility]? No   Discharge Plan A Home   Discharge Plan B Home with family   DME Needed Upon Discharge  none   Patient/Family in Agreement with Plan unable to assess

## 2020-10-26 NOTE — H&P
[facility]  History & Physical    DOS: 10/25/2020  11:18 PM      Patient Name: Blaire Cage  MRN: [id number]  Admission Date: 10/25/2020  Attending Physician: Dr. Joyce  Primary Care Provider: Eli Edmonds MD         Patient information was obtained from patient, relative(s) and ER records.     Subjective:     Principal Problem:Acute colitis    Chief Complaint:   Chief Complaint   Patient presents with    Nausea     last meal at 1200.     Vomiting    Diarrhea    Weakness     Pt went to restroom after beginning of incontinence and lowered herself to the floor. Family found her and cleaned her up prior to calling EMS        HPI: Ms. Cage is a 89-year-old female with a history of anemia, CHF, COPD, HLD, HTN, hypothyroidism, DVT & PE, MTHFR mutation on eliquis who presents to the ER with complaints of vomiting. It is severe. It is associated with diarrhea, abd pain for the last few months, possible melena, and weakness. She denies fever, chills, cough, chest pain, or dizziness. She was found at her home with vomit and dark diarrhea on the ground. She does not know if she lost consciousness and states she was only down for 20 minutes. Her daughter got her up and cleaned her up and she began vomiting pink again. She did take pepto bismal for her diarrhea and abd pain. She is feeling much better since arriving to the ED. She reports abd pain intermittently for months. She has been intermittently on abx for months as well for recurrent UTIs. Pt would like to be a full code.     Past Medical History:   Diagnosis Date    Anemia due to multiple mechanisms 6/29/2018    Anemia, chronic disease 6/29/2018    Anemia, chronic renal failure, stage 3 (moderate) 6/29/2018    Anticoagulant long-term use     aspirin    Aortic aneurysm     CHF (congestive heart failure)     COPD (chronic obstructive pulmonary disease)     COPD with acute exacerbation 1/9/2015    Diabetes mellitus type  II     DVT (deep venous thrombosis) 06/09/2018    Encounter for blood transfusion     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 8/7/2018    Hip arthritis 3/1/2016    Homocysteinemia 8/7/2018    Hyperlipidemia     Hypertension     Normocytic normochromic anemia 6/29/2018    PE (pulmonary thromboembolism) 06/09/2018    Pneumonia of right lower lobe due to infectious organism 9/11/2017    Thyroid disease     hypothyroid    Type 2 diabetes mellitus with stage 3 chronic kidney disease 1/18/2013       Past Surgical History:   Procedure Laterality Date    APPENDECTOMY      CHOLECYSTECTOMY      FRACTURE SURGERY      right hip     HERNIA REPAIR      groin    HYSTERECTOMY      INSERTION OF IMPLANTABLE LOOP RECORDER N/A 12/12/2018    Procedure: Insertion, Implantable Loop Recorder;  Surgeon: Ashok Herbert MD;  Location: [facility] CATH LAB;  Service: Cardiology;  Laterality: N/A;    PERCUTANEOUS PINNING OF HIP Left 3/23/2019    Procedure: PINNING, HIP, PERCUTANEOUS;  Surgeon: Jorje Hamlin MD;  Location: [facility] OR;  Service: Orthopedics;  Laterality: Left;    VARICOSE VEIN SURGERY         Review of patient's allergies indicates:   Allergen Reactions    Carvedilol Other (See Comments)     Bradycardia and syncope    Boniva [ibandronate]     Codeine     Hydralazine analogues     Iodinated contrast media Hives    Kionex [sodium polystyrene sulfonate] Diarrhea     From encounter 12/11/17 for diarrhea--not true allergy.    Lisinopril     Morphine        No current facility-administered medications on file prior to encounter.      Current Outpatient Medications on File Prior to Encounter   Medication Sig    acetaminophen (TYLENOL) 325 MG tablet Take 2 tablets (650 mg total) by mouth every 4 (four) hours as needed. (Patient taking differently: Take by mouth every 4 (four) hours as needed for Pain. )    albuterol (PROVENTIL) 2.5 mg /3 mL (0.083 %) nebulizer solution Take 2.5 mg by nebulization every 6 (six) hours  as needed.     albuterol-ipratropium (DUO-NEB) 2.5 mg-0.5 mg/3 mL nebulizer solution Take 3 mLs by nebulization every 6 (six) hours as needed for Wheezing. Rescue    allopurinoL (ZYLOPRIM) 100 MG tablet Take 100 mg by mouth once daily.    amLODIPine (NORVASC) 5 MG tablet Take 1 tablet (5 mg total) by mouth 2 (two) times daily.    apixaban (ELIQUIS) 5 mg Tab Take 1 tablet (5 mg total) by mouth 2 (two) times daily.    ascorbic acid (VITAMIN C) 500 MG tablet Take 500 mg by mouth once daily.      aspirin (ECOTRIN) 81 MG EC tablet Take 81 mg by mouth once daily.      calcium carbonate (OS-TASIA) 500 mg calcium (1,250 mg) tablet Take 1 tablet by mouth 2 (two) times daily.      diclofenac sodium (VOLTAREN) 1 % Gel Apply 2 g topically once daily.    docusate sodium (COLACE) 100 MG capsule Take 1 capsule (100 mg total) by mouth 2 (two) times daily.    ferrous sulfate (FEOSOL) 325 mg (65 mg iron) Tab tablet Take 1 tablet (325 mg total) by mouth 2 (two) times daily with meals.    fish oil-omega-3 fatty acids 300-1,000 mg capsule Take 2 g by mouth every evening.     fluocinolone (SYNALAR) 0.01 % external solution Apply topically 2 (two) times daily.    fluticasone (FLONASE) 50 mcg/actuation nasal spray 2 sprays by Each Nare route once daily.    fluticasone-umeclidin-vilanter (TRELEGY ELLIPTA) 100-62.5-25 mcg DsDv Inhale 1 puff into the lungs once daily.    folic acid-vit B6-vit B12 2.5-25-2 mg (FOLBIC) 2.5-25-2 mg Tab Take 1 tablet by mouth once daily.    furosemide (LASIX) 20 MG tablet Take 1 tablet only as needed, for swelling, increase SOB, weight gain of 3 lbs overnight or 5 lbs for the week    furosemide (LASIX) 40 MG tablet Take 40 mg by mouth once daily.    gabapentin (NEURONTIN) 300 MG capsule Take 1 capsule (300 mg total) by mouth every evening.    levothyroxine (SYNTHROID) 88 MCG tablet Take 88 mcg by mouth once daily.    lidocaine (LIDODERM) 5 % Place 1 patch onto the skin once daily. Remove &  Discard patch within 12 hours or as directed by MD    linaCLOtide (LINZESS) 72 mcg Cap capsule Take 1 capsule (72 mcg total) by mouth once daily.    magnesium oxide (MAG-OX) 400 mg (241.3 mg magnesium) tablet TAKE 1 TABLET BY MOUTH ONCE DAILY (Patient taking differently: Take 400 mg by mouth once daily. )    meclizine (ANTIVERT) 25 mg tablet Take 1 tablet (25 mg total) by mouth 3 (three) times daily as needed.    montelukast (SINGULAIR) 10 mg tablet TAKE 1 TABLET BY MOUTH ONCE DAILY IN THE EVENING (Patient taking differently: Take 10 mg by mouth every evening. )    multivitamin (THERAGRAN) tablet Take 1 tablet by mouth once daily.    mupirocin (BACTROBAN) 2 % ointment Apply topically 2 (two) times daily.    nitroGLYCERIN (NITROSTAT) 0.4 MG SL tablet Place 1 tablet (0.4 mg total) under the tongue every 5 (five) minutes as needed for Chest pain. As needed (Patient taking differently: Place 0.4 mg under the tongue every 5 (five) minutes as needed for Chest pain. Seek medical help if pain is not relieved after the third dose.)    omeprazole (PRILOSEC) 40 MG capsule Take 1 capsule (40 mg total) by mouth once daily.    sertraline (ZOLOFT) 25 MG tablet Take 1 tablet (25 mg total) by mouth once daily.    simvastatin (ZOCOR) 40 MG tablet TAKE 1 TABLET BY MOUTH ONCE DAILY IN THE EVENING (Patient taking differently: Take 40 mg by mouth every evening. TAKE 1 TABLET BY MOUTH ONCE DAILY IN THE EVENING)    vitamin D 1000 units Tab Take 1 tablet (1,000 Units total) by mouth once daily.     Family History     Problem Relation (Age of Onset)    Diabetes Sister, Brother    Heart disease Mother    Hypertension Mother    Kidney disease Son    Lung disease Father        Tobacco Use    Smoking status: Former Smoker     Packs/day: 0.35     Years: 44.00     Pack years: 15.40     Types: Cigarettes     Quit date: 2015     Years since quittin.4    Smokeless tobacco: Never Used    Tobacco comment: 2015   Substance  and Sexual Activity    Alcohol use: No     Alcohol/week: 0.0 standard drinks     Frequency: Never     Binge frequency: Never    Drug use: No    Sexual activity: Never     Review of Systems   Constitutional: Positive for fatigue. Negative for activity change, appetite change, chills, diaphoresis, fever and unexpected weight change.   HENT: Negative for congestion, ear pain, facial swelling, hearing loss, sore throat and trouble swallowing.    Eyes: Negative for pain and discharge.   Respiratory: Negative for cough, chest tightness, shortness of breath and wheezing.    Cardiovascular: Negative for chest pain, palpitations and leg swelling.   Gastrointestinal: Positive for diarrhea, nausea and vomiting. Negative for abdominal pain and blood in stool.        Dark stool, melena   Endocrine: Negative for polydipsia, polyphagia and polyuria.   Genitourinary: Negative for difficulty urinating, dysuria, flank pain, frequency and urgency.   Musculoskeletal: Negative for arthralgias, back pain, joint swelling, neck pain and neck stiffness.   Skin: Negative for rash and wound.   Allergic/Immunologic: Negative for environmental allergies and immunocompromised state.   Neurological: Positive for weakness. Negative for dizziness, seizures, syncope, speech difficulty, light-headedness, numbness and headaches.   Hematological: Negative for adenopathy.   Psychiatric/Behavioral: Negative for sleep disturbance and suicidal ideas. The patient is not nervous/anxious.    All other systems reviewed and are negative.    Objective:     Vital Signs (Most Recent):  Temp: 97.7 °F (36.5 °C) (10/25/20 1951)  Pulse: 66 (10/25/20 2230)  Resp: 19 (10/25/20 2230)  BP: (!) 166/72 (10/25/20 2230)  SpO2: 95 % (10/25/20 2230) Vital Signs (24h Range):  Temp:  [97.7 °F (36.5 °C)] 97.7 °F (36.5 °C)  Pulse:  [59-66] 66  Resp:  [19-20] 19  SpO2:  [95 %-99 %] 95 %  BP: (162-166)/(69-72) 166/72     Weight: 54.4 kg (120 lb)  Body mass index is 19.37  kg/m².    Physical Exam  Vitals signs and nursing note reviewed.   Constitutional:       Appearance: She is ill-appearing.      Comments: thin   HENT:      Head: Normocephalic and atraumatic.      Nose: Nose normal.      Comments: Pink crust to nostrils     Mouth/Throat:      Mouth: Mucous membranes are moist.      Pharynx: Oropharynx is clear.   Eyes:      Extraocular Movements: Extraocular movements intact.      Pupils: Pupils are equal, round, and reactive to light.   Neck:      Musculoskeletal: Normal range of motion and neck supple.   Cardiovascular:      Rate and Rhythm: Normal rate and regular rhythm.      Pulses: Normal pulses.      Heart sounds: Murmur present.   Pulmonary:      Effort: Pulmonary effort is normal.      Breath sounds: Normal breath sounds.   Abdominal:      General: Bowel sounds are normal.      Palpations: Abdomen is soft.      Tenderness: There is no abdominal tenderness.   Musculoskeletal: Normal range of motion.   Skin:     General: Skin is warm and dry.      Capillary Refill: Capillary refill takes less than 2 seconds.   Neurological:      General: No focal deficit present.      Mental Status: She is alert and oriented to person, place, and time.   Psychiatric:         Mood and Affect: Mood normal.         Behavior: Behavior normal.           CRANIAL NERVES     CN III, IV, VI   Pupils are equal, round, and reactive to light.       Significant Labs:   CBC:   Recent Labs   Lab 10/25/20  2015   WBC 11.95   HGB 9.6*   HCT 29.1*        CMP:   Recent Labs   Lab 10/25/20  2015      K 4.1      CO2 24   *   BUN 82*   CREATININE 2.6*   CALCIUM 8.9   PROT 6.5   ALBUMIN 3.9   BILITOT 0.6   ALKPHOS 65   AST 26   ALT 20   ANIONGAP 11   EGFRNONAA 15.7*       Significant Imaging: EKG: I have reviewed all pertinent results/findings within the past 24 hours and my personal findings are: NSR, anterior infarct age undetermined     Ct Head Without Contrast    Result Date:  10/25/2020  CT BRAIN AND CERVICAL SPINE HISTORY: Trauma. COMPARISON: None. TECHNIQUE: CT scan of the brain and cervical spine was performed without IV contrast. This exam was performed according to our departmental dose-optimization program, which includes automated exposure control, adjustment of the mA and/or kV according to patient size and/or use of iterative reconstruction technique. FINDINGS: BRAIN: There are scattered areas of hypoattenuation within the periventricular white matter, which likely represent chronic microvascular ischemia. No evidence of acute infarction, intracranial hemorrhage, extra-axial fluid collection, or midline shift. No air-fluid levels are seen in the paranasal sinuses to suggest acute sinusitis. No depressed skull fracture. CERVICAL SPINE: No acute cervical fracture or prevertebral soft tissue swelling. There is straightening of the normal cervical lordosis, which may be due to cervical collar, muscle spasm, or patient positioning. There is multilevel degenerative disc disease as well as facet DJD throughout the cervical spine. There are multilevel disc bulges but without advanced canal stenosis identified. IMPRESSION: 1.  No acute intracranial hemorrhage. 2.  No acute fracture or subluxation of the cervical spine. Electronically signed by:  Lionel Huber MD  10/25/2020 9:28 PM CDT Workstation: [id number]    Ct Cervical Spine Without Contrast    Result Date: 10/25/2020  CT BRAIN AND CERVICAL SPINE HISTORY: Trauma. COMPARISON: None. TECHNIQUE: CT scan of the brain and cervical spine was performed without IV contrast. This exam was performed according to our departmental dose-optimization program, which includes automated exposure control, adjustment of the mA and/or kV according to patient size and/or use of iterative reconstruction technique. FINDINGS: BRAIN: There are scattered areas of hypoattenuation within the periventricular white matter, which likely represent chronic microvascular  ischemia. No evidence of acute infarction, intracranial hemorrhage, extra-axial fluid collection, or midline shift. No air-fluid levels are seen in the paranasal sinuses to suggest acute sinusitis. No depressed skull fracture. CERVICAL SPINE: No acute cervical fracture or prevertebral soft tissue swelling. There is straightening of the normal cervical lordosis, which may be due to cervical collar, muscle spasm, or patient positioning. There is multilevel degenerative disc disease as well as facet DJD throughout the cervical spine. There are multilevel disc bulges but without advanced canal stenosis identified. IMPRESSION: 1.  No acute intracranial hemorrhage. 2.  No acute fracture or subluxation of the cervical spine. Electronically signed by:  Lionel Huber MD  10/25/2020 9:28 PM CDT Workstation: [id number]    Ct Renal Stone Study Abd Pelvis Wo    Result Date: 10/25/2020  CT ABDOMEN PELVIS WITHOUT IV CONTRAST HISTORY: Flank pain. COMPARISON: 9/18/2020 TECHNIQUE: CT scan of the abdomen and pelvis was performed without IV contrast. This exam was performed according to our departmental dose-optimization program, which includes automated exposure control, adjustment of the mA and/or kV according to patient size and/or use of iterative reconstruction technique. FINDINGS: Mild scarring in the right lower lobe but without pleural or pericardial effusions. Small hiatal hernia. There has been a prior cholecystectomy. Liver, spleen, pancreas, and adrenal glands are normal. There are multiple small nonobstructing stones in both kidneys. No hydronephrosis is seen. There has been a prior hysterectomy. There is circumferential wall thickening which involves the transverse, descending, and sigmoid colon. The appendix is not visualized. The stomach and small bowel are unremarkable. No free air or free fluid is evident. There is a 3.8 cm infrarenal abdominal aortic aneurysm. Post surgical changes in the bilateral hip joints are noted.  There are mild degenerative changes of the spine. No pathologic body wall hernia is seen. IMPRESSION: 1.  Findings suggestive of acute colitis involving the distal colon, of infectious or inflammatory etiology. 2.  Multiple bilateral nonobstructing renal stones. 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 Keenan - called per ER MD   NPO after midnight for possible scope in AM   Protonix gtt  Stool studies/c. Diff  Pt has AAA, levaquin is contraindicated  Will start flagyl for now until GI pathogens come back  Type and screen  Repeat CBC in AM  Renal function is slightly worse, but close to baseline, given fluids in ED  Repeat BMP in AM to monitor renal function  Hold asa and eliquis for now    VTE Risk Mitigation (From admission, onward)         Ordered     IP VTE HIGH RISK PATIENT  Once      10/25/20 2257     Place sequential compression device  Until discontinued      10/25/20 2257                   Jennifer Schneider NP  Department of Hospital Medicine   Critical access hospital

## 2020-10-26 NOTE — PROGRESS NOTES
"[facility] Medicine Progress Note  Patient Name: Blaire Cage MRN: [id number]   Patient Class: OP- Observation  Length of Stay: 0   Admission Date: 10/25/2020  7:38 PM Attending Physician: Jenaro Graff MD   Primary Care Provider: Eli Edmonds MD Face-to-Face encounter date: 10/26/2020   Chief Complaint: Nausea (last meal at 1200. ), Vomiting, Diarrhea, and Weakness (Pt went to restroom after beginning of incontinence and lowered herself to the floor. Family found her and cleaned her up prior to calling EMS)    Assessment & Plan:   Blaire Cage is a 89 y.o. 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     Subjective:    Interval History   Patient is doing well.    Denies chest pain, shortness of breath, palpitations, abdominal pain, nausea/vomiting.   No concerns/issues overnight reported by the patient or the nursing staff.  Reviewed the labs and discussed the plan of care.   No family present at bedside.     Review of Systems   All other Review of Systems were found to be negative expect for that mentioned already in HPI.   Objective:   Physical Exam  /60   Pulse 70   Temp 98.7 °F (37.1 °C)   Resp 15   Ht 5' 6" (1.676 m)   Wt 54.4 kg (119 lb 14.9 oz)   LMP  (LMP Unknown)   SpO2 (!) 93%   Breastfeeding No   BMI 19.36 kg/m²   Vitals reviewed.    Constitutional: No distress.   HENT: Atraumatic.   Cardiovascular: Normal rate, regular rhythm and normal heart sounds.   Pulmonary/Chest: Effort normal. Clear to auscultation bilaterally. No wheezes.   Abdominal: Soft. Bowel sounds are normal. Exhibits no distension and no mass. No tenderness  Neurological: Alert.   Skin: Skin is warm and dry.     Following labs were Reviewed   Recent Labs   Lab 10/25/20  2015 10/26/20  0037   WBC " 11.95 10.63   HGB 9.6* 9.0*   HCT 29.1* 27.5*    158   CALCIUM 8.9 8.4*   ALBUMIN 3.9  --    PROT 6.5  --     135*   K 4.1 4.6   CO2 24 23    101   BUN 82* 78*   CREATININE 2.6* 2.6*   ALKPHOS 65  --    ALT 20  --    AST 26  --    BILITOT 0.6  --      No results found for: POCTGLUCOSE     All labs within the past 24 hours have been reviewed  Microbiology Results (last 7 days)     Procedure Component Value Units Date/Time    Blood culture #1 **CANNOT BE ORDERED STAT** [[id number]] Collected: 10/25/20 2015    Order Status: Completed Specimen: Blood from Peripheral, Antecubital, Right Updated: 10/26/20 0358     Blood Culture, Routine No Growth to date    Blood culture #2 **CANNOT BE ORDERED STAT** [[id number]] Collected: 10/25/20 2126    Order Status: Completed Specimen: Blood from Peripheral, Antecubital, Right Updated: 10/26/20 0358     Blood Culture, Routine No Growth to date    Clostridium difficile EIA [[id number]]     Order Status: No result Specimen: Stool         CT Renal Stone Study ABD Pelvis WO   Final Result      CT Cervical Spine Without Contrast   Final Result      CT Head Without Contrast   Final Result      X-Ray Chest AP Portable   Final Result          Inpatient medications  Scheduled Meds:   allopurinoL  100 mg Oral Daily    amLODIPine  5 mg Oral BID    ferrous sulfate  325 mg Oral BID WM    fluticasone propionate  2 spray Each Nostril Daily    fluticasone-umeclidin-vilanter  1 puff Inhalation Daily    gabapentin  300 mg Oral QHS    levothyroxine  88 mcg Oral Before breakfast    magnesium oxide  400 mg Oral Daily    metronidazole  500 mg Intravenous Q8H    montelukast  10 mg Oral QHS    multivitamin  1 tablet Oral Daily    sertraline  25 mg Oral Daily    simvastatin  40 mg Oral QHS     Continuous Infusions:   pantoprozole (PROTONIX) IV infusion 8 mg/hr (10/26/20 1057)     PRN Meds:.acetaminophen, albuterol, melatonin, ondansetron, polyethylene glycol, sodium chloride  0.9%    Above encounter included review of the medical records, interviewing and examining the patient face-to-face, discussion with family and other health care providers, ordering and interpreting lab/test results and formulating a plan of care.     Medical Decision Making:    [] Low Complexity  [x] Moderate Complexity  [] High Complexity    Jenaro Graff  [facility] Hospitalist  10/26/2020

## 2020-10-26 NOTE — HPI
Ms. Cage is a 89-year-old female with a history of anemia, CHF, COPD, HLD, HTN, hypothyroidism, DVT & PE, MTHFR mutation on eliquis who presents to the ER with complaints of vomiting. It is severe. It is associated with diarrhea, abd pain for the last few months, possible melena, and weakness. She denies fever, chills, cough, chest pain, or dizziness. She was found at her home with vomit and dark diarrhea on the ground. She does not know if she lost consciousness and states she was only down for 20 minutes. Her daughter got her up and cleaned her up and she began vomiting pink again. She did take pepto bismal for her diarrhea and abd pain. She is feeling much better since arriving to the ED. She reports abd pain intermittently for months. She has been intermittently on abx for months as well for recurrent UTIs. Pt would like to be a full code.

## 2020-10-26 NOTE — NURSING
Patient back in room and eating. No complaints at this time. Call bell within reach and bed in lowest position.

## 2020-10-26 NOTE — TRANSFER OF CARE
"Anesthesia Transfer of Care Note    Patient: Blaire Cage    Procedure(s) Performed: Procedure(s) (LRB):  EGD (ESOPHAGOGASTRODUODENOSCOPY) (Left)    Patient location: GI    Anesthesia Type: MAC    Transport from OR: Transported from OR on room air with adequate spontaneous ventilation    Post pain: adequate analgesia    Post assessment: no apparent anesthetic complications    Post vital signs: stable    Level of consciousness: awake and alert    Nausea/Vomiting: no nausea/vomiting    Complications: none    Transfer of care protocol was followed      Last vitals:   Visit Vitals  BP (!) 115/47 (BP Location: Right arm, Patient Position: Lying)   Pulse 62   Temp 36 °C (96.8 °F) (Axillary)   Resp 16   Ht 5' 6" (1.676 m)   Wt 54.4 kg (119 lb 14.9 oz)   LMP  (LMP Unknown)   SpO2 100%   Breastfeeding No   BMI 19.36 kg/m²     "

## 2020-10-26 NOTE — BRIEF OP NOTE
EGD  -GAVE in antrum- mild  -multiple friable avms in proximal body.  -normal esophagus, small HH  -normal duodenum    Plan  -hold eliquis and repeat egd wed  -pt effectively anticoagulated due to low cr clearance  -npo p mn Tuesday for wed egd

## 2020-10-26 NOTE — PLAN OF CARE
Medicare Outpatient Observation Notice was signed, explained and given to patient/caregiver on 10/26/2020 at 11:17am     answered all questions

## 2020-10-26 NOTE — SUBJECTIVE & OBJECTIVE
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 8/7/2018    Hip arthritis 3/1/2016    Homocysteinemia 8/7/2018    Hyperlipidemia     Hypertension     Normocytic normochromic anemia 6/29/2018    PE (pulmonary thromboembolism) 06/09/2018    Pneumonia of right lower lobe due to infectious organism 9/11/2017    Thyroid disease     hypothyroid    Type 2 diabetes mellitus with stage 3 chronic kidney disease 1/18/2013       Past Surgical History:   Procedure Laterality Date    APPENDECTOMY      CHOLECYSTECTOMY      FRACTURE SURGERY      right hip     HERNIA REPAIR      groin    HYSTERECTOMY      INSERTION OF IMPLANTABLE LOOP RECORDER N/A 12/12/2018    Procedure: Insertion, Implantable Loop Recorder;  Surgeon: Ashok Herbert MD;  Location: [facility] CATH LAB;  Service: Cardiology;  Laterality: N/A;    PERCUTANEOUS PINNING OF HIP Left 3/23/2019    Procedure: PINNING, HIP, PERCUTANEOUS;  Surgeon: Jorje Hamlin MD;  Location: [facility] OR;  Service: Orthopedics;  Laterality: Left;    VARICOSE VEIN SURGERY         Review of patient's allergies indicates:   Allergen Reactions    Carvedilol Other (See Comments)     Bradycardia and syncope    Boniva [ibandronate]     Codeine     Hydralazine analogues     Iodinated contrast media Hives    Kionex [sodium polystyrene sulfonate] Diarrhea     From encounter 12/11/17 for diarrhea--not true allergy.    Lisinopril     Morphine        No current facility-administered medications on file prior  to encounter.      Current Outpatient Medications on File Prior to Encounter   Medication Sig    acetaminophen (TYLENOL) 325 MG tablet Take 2 tablets (650 mg total) by mouth every 4 (four) hours as needed. (Patient taking differently: Take by mouth every 4 (four) hours as needed for Pain. )    albuterol (PROVENTIL) 2.5 mg /3 mL (0.083 %) nebulizer solution Take 2.5 mg by nebulization every 6 (six) hours as needed.     albuterol-ipratropium (DUO-NEB) 2.5 mg-0.5 mg/3 mL nebulizer solution Take 3 mLs by nebulization every 6 (six) hours as needed for Wheezing. Rescue    allopurinoL (ZYLOPRIM) 100 MG tablet Take 100 mg by mouth once daily.    amLODIPine (NORVASC) 5 MG tablet Take 1 tablet (5 mg total) by mouth 2 (two) times daily.    apixaban (ELIQUIS) 5 mg Tab Take 1 tablet (5 mg total) by mouth 2 (two) times daily.    ascorbic acid (VITAMIN C) 500 MG tablet Take 500 mg by mouth once daily.      aspirin (ECOTRIN) 81 MG EC tablet Take 81 mg by mouth once daily.      calcium carbonate (OS-TASIA) 500 mg calcium (1,250 mg) tablet Take 1 tablet by mouth 2 (two) times daily.      diclofenac sodium (VOLTAREN) 1 % Gel Apply 2 g topically once daily.    docusate sodium (COLACE) 100 MG capsule Take 1 capsule (100 mg total) by mouth 2 (two) times daily.    ferrous sulfate (FEOSOL) 325 mg (65 mg iron) Tab tablet Take 1 tablet (325 mg total) by mouth 2 (two) times daily with meals.    fish oil-omega-3 fatty acids 300-1,000 mg capsule Take 2 g by mouth every evening.     fluocinolone (SYNALAR) 0.01 % external solution Apply topically 2 (two) times daily.    fluticasone (FLONASE) 50 mcg/actuation nasal spray 2 sprays by Each Nare route once daily.    fluticasone-umeclidin-vilanter (TRELEGY ELLIPTA) 100-62.5-25 mcg DsDv Inhale 1 puff into the lungs once daily.    folic acid-vit B6-vit B12 2.5-25-2 mg (FOLBIC) 2.5-25-2 mg Tab Take 1 tablet by mouth once daily.    furosemide (LASIX) 20 MG tablet Take 1 tablet only as  needed, for swelling, increase SOB, weight gain of 3 lbs overnight or 5 lbs for the week    furosemide (LASIX) 40 MG tablet Take 40 mg by mouth once daily.    gabapentin (NEURONTIN) 300 MG capsule Take 1 capsule (300 mg total) by mouth every evening.    levothyroxine (SYNTHROID) 88 MCG tablet Take 88 mcg by mouth once daily.    lidocaine (LIDODERM) 5 % Place 1 patch onto the skin once daily. Remove & Discard patch within 12 hours or as directed by MD    linaCLOtide (LINZESS) 72 mcg Cap capsule Take 1 capsule (72 mcg total) by mouth once daily.    magnesium oxide (MAG-OX) 400 mg (241.3 mg magnesium) tablet TAKE 1 TABLET BY MOUTH ONCE DAILY (Patient taking differently: Take 400 mg by mouth once daily. )    meclizine (ANTIVERT) 25 mg tablet Take 1 tablet (25 mg total) by mouth 3 (three) times daily as needed.    montelukast (SINGULAIR) 10 mg tablet TAKE 1 TABLET BY MOUTH ONCE DAILY IN THE EVENING (Patient taking differently: Take 10 mg by mouth every evening. )    multivitamin (THERAGRAN) tablet Take 1 tablet by mouth once daily.    mupirocin (BACTROBAN) 2 % ointment Apply topically 2 (two) times daily.    nitroGLYCERIN (NITROSTAT) 0.4 MG SL tablet Place 1 tablet (0.4 mg total) under the tongue every 5 (five) minutes as needed for Chest pain. As needed (Patient taking differently: Place 0.4 mg under the tongue every 5 (five) minutes as needed for Chest pain. Seek medical help if pain is not relieved after the third dose.)    omeprazole (PRILOSEC) 40 MG capsule Take 1 capsule (40 mg total) by mouth once daily.    sertraline (ZOLOFT) 25 MG tablet Take 1 tablet (25 mg total) by mouth once daily.    simvastatin (ZOCOR) 40 MG tablet TAKE 1 TABLET BY MOUTH ONCE DAILY IN THE EVENING (Patient taking differently: Take 40 mg by mouth every evening. TAKE 1 TABLET BY MOUTH ONCE DAILY IN THE EVENING)    vitamin D 1000 units Tab Take 1 tablet (1,000 Units total) by mouth once daily.     Family History     Problem  Relation (Age of Onset)    Diabetes Sister, Brother    Heart disease Mother    Hypertension Mother    Kidney disease Son    Lung disease Father        Tobacco Use    Smoking status: Former Smoker     Packs/day: 0.35     Years: 44.00     Pack years: 15.40     Types: Cigarettes     Quit date: 2015     Years since quittin.4    Smokeless tobacco: Never Used    Tobacco comment: 2015   Substance and Sexual Activity    Alcohol use: No     Alcohol/week: 0.0 standard drinks     Frequency: Never     Binge frequency: Never    Drug use: No    Sexual activity: Never     Review of Systems   Constitutional: Positive for fatigue. Negative for activity change, appetite change, chills, diaphoresis, fever and unexpected weight change.   HENT: Negative for congestion, ear pain, facial swelling, hearing loss, sore throat and trouble swallowing.    Eyes: Negative for pain and discharge.   Respiratory: Negative for cough, chest tightness, shortness of breath and wheezing.    Cardiovascular: Negative for chest pain, palpitations and leg swelling.   Gastrointestinal: Positive for diarrhea, nausea and vomiting. Negative for abdominal pain and blood in stool.        Dark stool, melena   Endocrine: Negative for polydipsia, polyphagia and polyuria.   Genitourinary: Negative for difficulty urinating, dysuria, flank pain, frequency and urgency.   Musculoskeletal: Negative for arthralgias, back pain, joint swelling, neck pain and neck stiffness.   Skin: Negative for rash and wound.   Allergic/Immunologic: Negative for environmental allergies and immunocompromised state.   Neurological: Positive for weakness. Negative for dizziness, seizures, syncope, speech difficulty, light-headedness, numbness and headaches.   Hematological: Negative for adenopathy.   Psychiatric/Behavioral: Negative for sleep disturbance and suicidal ideas. The patient is not nervous/anxious.    All other systems reviewed and are negative.    Objective:      Vital Signs (Most Recent):  Temp: 97.7 °F (36.5 °C) (10/25/20 1951)  Pulse: 66 (10/25/20 2230)  Resp: 19 (10/25/20 2230)  BP: (!) 166/72 (10/25/20 2230)  SpO2: 95 % (10/25/20 2230) Vital Signs (24h Range):  Temp:  [97.7 °F (36.5 °C)] 97.7 °F (36.5 °C)  Pulse:  [59-66] 66  Resp:  [19-20] 19  SpO2:  [95 %-99 %] 95 %  BP: (162-166)/(69-72) 166/72     Weight: 54.4 kg (120 lb)  Body mass index is 19.37 kg/m².    Physical Exam  Vitals signs and nursing note reviewed.   Constitutional:       Appearance: She is ill-appearing.      Comments: thin   HENT:      Head: Normocephalic and atraumatic.      Nose: Nose normal.      Comments: Pink crust to nostrils     Mouth/Throat:      Mouth: Mucous membranes are moist.      Pharynx: Oropharynx is clear.   Eyes:      Extraocular Movements: Extraocular movements intact.      Pupils: Pupils are equal, round, and reactive to light.   Neck:      Musculoskeletal: Normal range of motion and neck supple.   Cardiovascular:      Rate and Rhythm: Normal rate and regular rhythm.      Pulses: Normal pulses.      Heart sounds: Murmur present.   Pulmonary:      Effort: Pulmonary effort is normal.      Breath sounds: Normal breath sounds.   Abdominal:      General: Bowel sounds are normal.      Palpations: Abdomen is soft.      Tenderness: There is no abdominal tenderness.   Musculoskeletal: Normal range of motion.   Skin:     General: Skin is warm and dry.      Capillary Refill: Capillary refill takes less than 2 seconds.   Neurological:      General: No focal deficit present.      Mental Status: She is alert and oriented to person, place, and time.   Psychiatric:         Mood and Affect: Mood normal.         Behavior: Behavior normal.           CRANIAL NERVES     CN III, IV, VI   Pupils are equal, round, and reactive to light.       Significant Labs:   CBC:   Recent Labs   Lab 10/25/20  2015   WBC 11.95   HGB 9.6*   HCT 29.1*        CMP:   Recent Labs   Lab 10/25/20  2015      K  4.1      CO2 24   *   BUN 82*   CREATININE 2.6*   CALCIUM 8.9   PROT 6.5   ALBUMIN 3.9   BILITOT 0.6   ALKPHOS 65   AST 26   ALT 20   ANIONGAP 11   EGFRNONAA 15.7*       Significant Imaging: EKG: I have reviewed all pertinent results/findings within the past 24 hours and my personal findings are: NSR, anterior infarct age undetermined     Ct Head Without Contrast    Result Date: 10/25/2020  CT BRAIN AND CERVICAL SPINE HISTORY: Trauma. COMPARISON: None. TECHNIQUE: CT scan of the brain and cervical spine was performed without IV contrast. This exam was performed according to our departmental dose-optimization program, which includes automated exposure control, adjustment of the mA and/or kV according to patient size and/or use of iterative reconstruction technique. FINDINGS: BRAIN: There are scattered areas of hypoattenuation within the periventricular white matter, which likely represent chronic microvascular ischemia. No evidence of acute infarction, intracranial hemorrhage, extra-axial fluid collection, or midline shift. No air-fluid levels are seen in the paranasal sinuses to suggest acute sinusitis. No depressed skull fracture. CERVICAL SPINE: No acute cervical fracture or prevertebral soft tissue swelling. There is straightening of the normal cervical lordosis, which may be due to cervical collar, muscle spasm, or patient positioning. There is multilevel degenerative disc disease as well as facet DJD throughout the cervical spine. There are multilevel disc bulges but without advanced canal stenosis identified. IMPRESSION: 1.  No acute intracranial hemorrhage. 2.  No acute fracture or subluxation of the cervical spine. Electronically signed by:  Lionel Huber MD  10/25/2020 9:28 PM CDT Workstation: [id number]    Ct Cervical Spine Without Contrast    Result Date: 10/25/2020  CT BRAIN AND CERVICAL SPINE HISTORY: Trauma. COMPARISON: None. TECHNIQUE: CT scan of the brain and cervical spine was performed without  IV contrast. This exam was performed according to our departmental dose-optimization program, which includes automated exposure control, adjustment of the mA and/or kV according to patient size and/or use of iterative reconstruction technique. FINDINGS: BRAIN: There are scattered areas of hypoattenuation within the periventricular white matter, which likely represent chronic microvascular ischemia. No evidence of acute infarction, intracranial hemorrhage, extra-axial fluid collection, or midline shift. No air-fluid levels are seen in the paranasal sinuses to suggest acute sinusitis. No depressed skull fracture. CERVICAL SPINE: No acute cervical fracture or prevertebral soft tissue swelling. There is straightening of the normal cervical lordosis, which may be due to cervical collar, muscle spasm, or patient positioning. There is multilevel degenerative disc disease as well as facet DJD throughout the cervical spine. There are multilevel disc bulges but without advanced canal stenosis identified. IMPRESSION: 1.  No acute intracranial hemorrhage. 2.  No acute fracture or subluxation of the cervical spine. Electronically signed by:  Lionel Huber MD  10/25/2020 9:28 PM CDT Workstation: [id number]    Ct Renal Stone Study Abd Pelvis Wo    Result Date: 10/25/2020  CT ABDOMEN PELVIS WITHOUT IV CONTRAST HISTORY: Flank pain. COMPARISON: 9/18/2020 TECHNIQUE: CT scan of the abdomen and pelvis was performed without IV contrast. This exam was performed according to our departmental dose-optimization program, which includes automated exposure control, adjustment of the mA and/or kV according to patient size and/or use of iterative reconstruction technique. FINDINGS: Mild scarring in the right lower lobe but without pleural or pericardial effusions. Small hiatal hernia. There has been a prior cholecystectomy. Liver, spleen, pancreas, and adrenal glands are normal. There are multiple small nonobstructing stones in both kidneys. No  hydronephrosis is seen. There has been a prior hysterectomy. There is circumferential wall thickening which involves the transverse, descending, and sigmoid colon. The appendix is not visualized. The stomach and small bowel are unremarkable. No free air or free fluid is evident. There is a 3.8 cm infrarenal abdominal aortic aneurysm. Post surgical changes in the bilateral hip joints are noted. There are mild degenerative changes of the spine. No pathologic body wall hernia is seen. IMPRESSION: 1.  Findings suggestive of acute colitis involving the distal colon, of infectious or inflammatory etiology. 2.  Multiple bilateral nonobstructing renal stones. Electronically signed by:  Lionel Huber MD  10/25/2020 9:32 PM CDT Workstation: [id number]

## 2020-10-26 NOTE — ANESTHESIA POSTPROCEDURE EVALUATION
Anesthesia Post Evaluation    Patient: Blaire Cage    Procedure(s) Performed: Procedure(s) (LRB):  EGD (ESOPHAGOGASTRODUODENOSCOPY) (Left)    Final Anesthesia Type: MAC    Patient location: Endoscopy.  Patient participation: Yes- Able to Participate  Level of consciousness: awake and alert  Post-procedure vital signs: reviewed and stable  Pain management: adequate  Airway patency: patent    PONV status at discharge: No PONV  Anesthetic complications: no      Cardiovascular status: blood pressure returned to baseline and stable  Respiratory status: unassisted and room air  Hydration status: euvolemic  Follow-up not needed.          Vitals Value Taken Time   /57 10/26/20 0929   Temp 37.3 °C (99.1 °F) 10/26/20 0929   Pulse 67 10/26/20 0929   Resp 14 10/26/20 0929   SpO2 96 % 10/26/20 0929         No case tracking events are documented in the log.      Pain/Mandeep Score: No data recorded

## 2020-10-26 NOTE — CONSULTS
Chief Complaint:  melena    HPI:  89 F with history of DEREJE, CHF, Clotting disorder, h/o DVT/PE anticoagulated on eliquis (last dose yesterday) admitted with nausea,vomiting and melanic stools.  Pt on oral iron.  States stools black for months.  Denies NSAIDS.  No abd pain.  Recommendations in 2018 for bidirectional endoscopy.  CT imaging with colitis.      Normocytic anemia present since 2016    Results for SABRINA BARRIENTOS (MRN [id number]) as of 10/26/2020 09:09   Ref. Range 5/8/2020 05:16 5/9/2020 04:38 5/15/2020 22:41 6/11/2020 12:30 8/5/2020 16:02 9/18/2020 18:37 10/25/2020 20:15 10/26/2020 00:37   Hemoglobin Latest Ref Range: 12.0 - 16.0 g/dL 8.9 (L) 8.7 (L) 9.7 (L) 9.1 (L) 9.3 (L) 10.1 (L) 9.6 (L) 9.0 (L)   Hematocrit Latest Ref Range: 37.0 - 48.5 % 28.2 (L) 27.0 (L) 30.3 (L) 29.9 (L) 29.5 (L) 30.5 (L) 29.1 (L) 27.5 (L)   MCV Latest Ref Range: 82 - 98 fL 98 97 96 103 (H) 102 (H) 97 99 (H) 99 (H)     Review of Systems:  Complete ROS performed and negative unless stated above in HPI          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 8/7/2018    Hip arthritis 3/1/2016    Homocysteinemia 8/7/2018    Hyperlipidemia     Hypertension     Normocytic normochromic anemia 6/29/2018    PE (pulmonary thromboembolism) 06/09/2018    Pneumonia of right lower lobe due to infectious organism 9/11/2017    Thyroid disease     hypothyroid    Type 2 diabetes mellitus with stage 3 chronic kidney disease 1/18/2013       Past Surgical History:    Procedure Laterality Date    APPENDECTOMY      CHOLECYSTECTOMY      FRACTURE SURGERY      right hip     HERNIA REPAIR      groin    HYSTERECTOMY      INSERTION OF IMPLANTABLE LOOP RECORDER N/A 2018    Procedure: Insertion, Implantable Loop Recorder;  Surgeon: Ashok Herbert MD;  Location: [facility] CATH LAB;  Service: Cardiology;  Laterality: N/A;    PERCUTANEOUS PINNING OF HIP Left 3/23/2019    Procedure: PINNING, HIP, PERCUTANEOUS;  Surgeon: Jorje Hamlin MD;  Location: [facility] OR;  Service: Orthopedics;  Laterality: Left;    VARICOSE VEIN SURGERY         Family History   Problem Relation Age of Onset    Hypertension Mother     Heart disease Mother     Lung disease Father     Diabetes Sister     Diabetes Brother     Kidney disease Son        Social History     Socioeconomic History    Marital status: Legally      Spouse name: Not on file    Number of children: Not on file    Years of education: Not on file    Highest education level: Not on file   Occupational History    Not on file   Social Needs    Financial resource strain: Not very hard    Food insecurity     Worry: Never true     Inability: Never true    Transportation needs     Medical: No     Non-medical: No   Tobacco Use    Smoking status: Former Smoker     Packs/day: 0.35     Years: 44.00     Pack years: 15.40     Types: Cigarettes     Quit date: 2015     Years since quittin.4    Smokeless tobacco: Never Used    Tobacco comment: 2015   Substance and Sexual Activity    Alcohol use: No     Alcohol/week: 0.0 standard drinks     Frequency: Never     Binge frequency: Never    Drug use: No    Sexual activity: Never   Lifestyle    Physical activity     Days per week: 0 days     Minutes per session: 0 min    Stress: To some extent   Relationships    Social connections     Talks on phone: More than three times a week     Gets together: More than three times a week     Attends Mu-ism service: Not on  file     Active member of club or organization: No     Attends meetings of clubs or organizations: Never     Relationship status:    Other Topics Concern    Not on file   Social History Narrative    Not on file       Review of patient's allergies indicates:   Allergen Reactions    Carvedilol Other (See Comments)     Bradycardia and syncope    Boniva [ibandronate]     Codeine     Hydralazine analogues     Iodinated contrast media Hives    Kionex [sodium polystyrene sulfonate] Diarrhea     From encounter 12/11/17 for diarrhea--not true allergy.    Lisinopril     Morphine        No current facility-administered medications on file prior to encounter.      Current Outpatient Medications on File Prior to Encounter   Medication Sig Dispense Refill    acetaminophen (TYLENOL) 325 MG tablet Take 2 tablets (650 mg total) by mouth every 4 (four) hours as needed. (Patient taking differently: Take by mouth every 4 (four) hours as needed for Pain. )  0    albuterol (PROVENTIL) 2.5 mg /3 mL (0.083 %) nebulizer solution Take 2.5 mg by nebulization every 6 (six) hours as needed.       albuterol-ipratropium (DUO-NEB) 2.5 mg-0.5 mg/3 mL nebulizer solution Take 3 mLs by nebulization every 6 (six) hours as needed for Wheezing. Rescue 120 vial 11    allopurinoL (ZYLOPRIM) 100 MG tablet Take 100 mg by mouth once daily.      amLODIPine (NORVASC) 5 MG tablet Take 1 tablet (5 mg total) by mouth 2 (two) times daily. 180 tablet 3    apixaban (ELIQUIS) 5 mg Tab Take 1 tablet (5 mg total) by mouth 2 (two) times daily. 180 tablet 3    ascorbic acid (VITAMIN C) 500 MG tablet Take 500 mg by mouth once daily.        aspirin (ECOTRIN) 81 MG EC tablet Take 81 mg by mouth once daily.        calcium carbonate (OS-TASIA) 500 mg calcium (1,250 mg) tablet Take 1 tablet by mouth 2 (two) times daily.        diclofenac sodium (VOLTAREN) 1 % Gel Apply 2 g topically once daily. 100 g prn    docusate sodium (COLACE) 100 MG capsule  Take 1 capsule (100 mg total) by mouth 2 (two) times daily. 90 capsule 0    ferrous sulfate (FEOSOL) 325 mg (65 mg iron) Tab tablet Take 1 tablet (325 mg total) by mouth 2 (two) times daily with meals. 180 tablet 3    fish oil-omega-3 fatty acids 300-1,000 mg capsule Take 2 g by mouth every evening.       fluocinolone (SYNALAR) 0.01 % external solution Apply topically 2 (two) times daily. 90 mL 1    fluticasone (FLONASE) 50 mcg/actuation nasal spray 2 sprays by Each Nare route once daily. 48 g 3    fluticasone-umeclidin-vilanter (TRELEGY ELLIPTA) 100-62.5-25 mcg DsDv Inhale 1 puff into the lungs once daily. 60 each 11    folic acid-vit B6-vit B12 2.5-25-2 mg (FOLBIC) 2.5-25-2 mg Tab Take 1 tablet by mouth once daily. 90 tablet 3    furosemide (LASIX) 20 MG tablet Take 1 tablet only as needed, for swelling, increase SOB, weight gain of 3 lbs overnight or 5 lbs for the week 24 tablet 3    furosemide (LASIX) 40 MG tablet Take 40 mg by mouth once daily.      gabapentin (NEURONTIN) 300 MG capsule Take 1 capsule (300 mg total) by mouth every evening. 90 capsule 3    levothyroxine (SYNTHROID) 88 MCG tablet Take 88 mcg by mouth once daily.      lidocaine (LIDODERM) 5 % Place 1 patch onto the skin once daily. Remove & Discard patch within 12 hours or as directed by MD 3 patch 0    linaCLOtide (LINZESS) 72 mcg Cap capsule Take 1 capsule (72 mcg total) by mouth once daily. 90 capsule 3    magnesium oxide (MAG-OX) 400 mg (241.3 mg magnesium) tablet TAKE 1 TABLET BY MOUTH ONCE DAILY (Patient taking differently: Take 400 mg by mouth once daily. ) 90 tablet 3    meclizine (ANTIVERT) 25 mg tablet Take 1 tablet (25 mg total) by mouth 3 (three) times daily as needed. 20 tablet 0    montelukast (SINGULAIR) 10 mg tablet TAKE 1 TABLET BY MOUTH ONCE DAILY IN THE EVENING (Patient taking differently: Take 10 mg by mouth every evening. ) 90 tablet 1    multivitamin (THERAGRAN) tablet Take 1 tablet by mouth once daily.       "mupirocin (BACTROBAN) 2 % ointment Apply topically 2 (two) times daily. 30 g 1    nitroGLYCERIN (NITROSTAT) 0.4 MG SL tablet Place 1 tablet (0.4 mg total) under the tongue every 5 (five) minutes as needed for Chest pain. As needed (Patient taking differently: Place 0.4 mg under the tongue every 5 (five) minutes as needed for Chest pain. Seek medical help if pain is not relieved after the third dose.) 30 tablet 3    omeprazole (PRILOSEC) 40 MG capsule Take 1 capsule (40 mg total) by mouth once daily. 30 capsule 11    sertraline (ZOLOFT) 25 MG tablet Take 1 tablet (25 mg total) by mouth once daily. 90 tablet 3    simvastatin (ZOCOR) 40 MG tablet TAKE 1 TABLET BY MOUTH ONCE DAILY IN THE EVENING (Patient taking differently: Take 40 mg by mouth every evening. TAKE 1 TABLET BY MOUTH ONCE DAILY IN THE EVENING) 90 tablet 3    vitamin D 1000 units Tab Take 1 tablet (1,000 Units total) by mouth once daily. 90 tablet 3       Objective:  BP (!) 115/47 (BP Location: Right arm, Patient Position: Lying)   Pulse 62   Temp 96.8 °F (36 °C) (Axillary)   Resp 16   Ht 5' 6" (1.676 m)   Wt 54.4 kg (119 lb 14.9 oz)   LMP  (LMP Unknown)   SpO2 100%   Breastfeeding No   BMI 19.36 kg/m²   General: elderly, nad  HE: ncat, perrl, eomi  ENT: op pink and moist without lesions or exudates  CV: +s1s2, no mrg, rrr  Resp: ctapb, no wrr  GI: bs active, abd soft, nt, nd  Skin: no lesions, no rash  Neuro: cn 2-12 in tact, no focal deficits, no asterixis  Psych: regular rate speech, normal affect    Labs:  Lab Results   Component Value Date    WBC 10.63 10/26/2020    HGB 9.0 (L) 10/26/2020    HCT 27.5 (L) 10/26/2020    MCV 99 (H) 10/26/2020     10/26/2020       BMP  Lab Results   Component Value Date     (L) 10/26/2020    K 4.6 10/26/2020     10/26/2020    CO2 23 10/26/2020    BUN 78 (H) 10/26/2020    CREATININE 2.6 (H) 10/26/2020    CALCIUM 8.4 (L) 10/26/2020    ANIONGAP 11 10/26/2020    ESTGFRAFRICA 18.1 (A) 10/26/2020 "    EGFRNONAA 15.7 (A) 10/26/2020     Lab Results   Component Value Date    INR 1.4 10/25/2020    INR 1.0 03/22/2019    INR 1.0 09/11/2017       Assessment:  89 F with chronic normocytic anemia with h/o DEREJE on oral iron supplementation admitted with gastroenteritis and CT imaging consistent with colitis with resolution of symptoms.  Given persistence of anemia and reports of melena will proceed with EGD    CT scans over past few years with mention of colitis    Plan:  EGD now  Consider colonoscopy to r/o CRC

## 2020-10-26 NOTE — ED PROVIDER NOTES
Encounter Date: 10/25/2020       History     Chief Complaint   Patient presents with    Nausea     last meal at 1200.     Vomiting    Diarrhea    Weakness     Pt went to restroom after beginning of incontinence and lowered herself to the floor. Family found her and cleaned her up prior to calling EMS     89-year-old female presents with an episode of nausea and vomiting, patient reports that she felt like she was going to throw up she ambulated to the bathroom and then light herself down on the floor patient began vomiting and having diarrhea at the same time patient reports that she feels generally weak she complains of cough, epigastric abdominal discomfort, patient also complains of pain in her neck, patient believes the pain in her neck is secondary to lying herself down on the floor she denies falling she denies loss of consciousness.  Patient reports that she was in her normal state of health before this episode which occurred approximately an hour ago.        Review of patient's allergies indicates:   Allergen Reactions    Carvedilol Other (See Comments)     Bradycardia and syncope    Boniva [ibandronate]     Codeine     Hydralazine analogues     Iodinated contrast media Hives    Kionex [sodium polystyrene sulfonate] Diarrhea     From encounter 12/11/17 for diarrhea--not true allergy.   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 8/7/2018    Hip arthritis 3/1/2016    Homocysteinemia  2018    Hyperlipidemia     Hypertension     Normocytic normochromic anemia 2018    PE (pulmonary thromboembolism) 2018    Pneumonia of right lower lobe due to infectious organism 2017    Thyroid disease     hypothyroid    Type 2 diabetes mellitus with stage 3 chronic kidney disease 2013     Past Surgical History:   Procedure Laterality Date    APPENDECTOMY      CHOLECYSTECTOMY      FRACTURE SURGERY      right hip     HERNIA REPAIR      groin    HYSTERECTOMY      INSERTION OF IMPLANTABLE LOOP RECORDER N/A 2018    Procedure: Insertion, Implantable Loop Recorder;  Surgeon: Ashok Herbert MD;  Location: [facility] CATH LAB;  Service: Cardiology;  Laterality: N/A;    PERCUTANEOUS PINNING OF HIP Left 3/23/2019    Procedure: PINNING, HIP, PERCUTANEOUS;  Surgeon: Jorje Hamlin MD;  Location: [facility] OR;  Service: Orthopedics;  Laterality: Left;    VARICOSE VEIN SURGERY       Family History   Problem Relation Age of Onset    Hypertension Mother     Heart disease Mother     Lung disease Father     Diabetes Sister     Diabetes Brother     Kidney disease Son      Social History     Tobacco Use    Smoking status: Former Smoker     Packs/day: 0.35     Years: 44.00     Pack years: 15.40     Types: Cigarettes     Quit date: 2015     Years since quittin.4    Smokeless tobacco: Never Used    Tobacco comment: 2015   Substance Use Topics    Alcohol use: No     Alcohol/week: 0.0 standard drinks     Frequency: Never     Binge frequency: Never    Drug use: No     Review of Systems   Constitutional: Negative for fever.   HENT: Negative for congestion, rhinorrhea, sore throat and trouble swallowing.    Eyes: Negative for visual disturbance.   Respiratory: Positive for cough. Negative for chest tightness, shortness of breath and wheezing.    Cardiovascular: Negative for chest pain, palpitations and leg swelling.   Gastrointestinal: Positive for abdominal pain, nausea and  vomiting. Negative for abdominal distention, constipation and diarrhea.   Genitourinary: Negative for difficulty urinating, dysuria, flank pain and frequency.   Musculoskeletal: Negative for arthralgias, back pain, joint swelling and neck pain.   Skin: Negative for color change and rash.   Neurological: Negative for dizziness, syncope, speech difficulty, weakness, numbness and headaches.   All other systems reviewed and are negative.      Physical Exam     Initial Vitals [10/25/20 1951]   BP Pulse Resp Temp SpO2   (!) 162/69 (!) 59 20 97.7 °F (36.5 °C) 99 %      MAP       --         Physical Exam    Nursing note and vitals reviewed.  Constitutional: She appears well-developed and well-nourished. She is not diaphoretic. No distress.   HENT:   Head: Normocephalic and atraumatic.   Right Ear: External ear normal.   Left Ear: External ear normal.   Nose: Nose normal.   Mouth/Throat: Oropharynx is clear and moist. No oropharyngeal exudate.   Eyes: Conjunctivae and EOM are normal. Pupils are equal, round, and reactive to light. Right eye exhibits no discharge. Left eye exhibits no discharge. No scleral icterus.   Neck: Normal range of motion. Neck supple. No thyromegaly present. No tracheal deviation present. No JVD present.   Cardiovascular: Normal rate, regular rhythm, normal heart sounds and intact distal pulses. Exam reveals no gallop and no friction rub.    No murmur heard.  Pulmonary/Chest: No stridor. No respiratory distress. She has no wheezes. She has no rhonchi. She has no rales. She exhibits no tenderness.   Coarse breath sounds noted bilaterally   Abdominal: Soft. Bowel sounds are normal. She exhibits no distension and no mass. There is abdominal tenderness. There is no rebound and no guarding.   Tenderness to palpation in the epigastric and periumbilical region, no rebound or guarding noted   Musculoskeletal: Normal range of motion. No tenderness or edema.   Lymphadenopathy:     She has no cervical  adenopathy.   Neurological: She is alert and oriented to person, place, and time. She has normal strength. She displays normal reflexes. No cranial nerve deficit or sensory deficit.   Skin: Skin is warm and dry. No rash and no abscess noted. No erythema. No pallor.         ED Course   Procedures  Labs Reviewed   CBC W/ AUTO DIFFERENTIAL - Abnormal; Notable for the following components:       Result Value    RBC 2.95 (*)     Hemoglobin 9.6 (*)     Hematocrit 29.1 (*)     Mean Corpuscular Volume 99 (*)     Mean Corpuscular Hemoglobin 32.5 (*)     Immature Granulocytes 0.7 (*)     Gran # (ANC) 9.2 (*)     Immature Grans (Abs) 0.08 (*)     Lymph # 0.9 (*)     Eos # 0.9 (*)     Gran% 76.7 (*)     Lymph% 7.9 (*)     All other components within normal limits   COMPREHENSIVE METABOLIC PANEL - Abnormal; Notable for the following components:    Glucose 137 (*)     BUN, Bld 82 (*)     Creatinine 2.6 (*)     eGFR if  18.1 (*)     eGFR if non  15.7 (*)     All other components within normal limits   B-TYPE NATRIURETIC PEPTIDE - Abnormal; Notable for the following components:     (*)     All other components within normal limits   PROTIME-INR - Abnormal; Notable for the following components:    PT 16.1 (*)     All other components within normal limits   OCCULT BLOOD X 1, STOOL - Abnormal; Notable for the following components:    Occult Blood Positive (*)     All other components within normal limits   CULTURE, BLOOD   CULTURE, BLOOD   CLOSTRIDIUM DIFFICILE   LIPASE   SARS-COV-2 RNA AMPLIFICATION, QUAL   LACTIC ACID, PLASMA   TROPONIN I   URINALYSIS, REFLEX TO URINE CULTURE   GASTROINTESTINAL PATHOGENS PANEL, PCR          Imaging Results          CT Renal Stone Study ABD Pelvis WO (Final result)  Result time 10/25/20 20:34:00    Final result by Lionel Huber MD (10/25/20 20:34:00)                 Narrative:    CT ABDOMEN PELVIS WITHOUT IV CONTRAST    HISTORY: Flank pain.    COMPARISON:  9/18/2020    TECHNIQUE: CT scan of the abdomen and pelvis was performed without IV contrast. This exam was performed according to our departmental dose-optimization program, which includes automated exposure control, adjustment of the mA and/or kV according to patient size and/or use of iterative reconstruction technique.    FINDINGS:    Mild scarring in the right lower lobe but without pleural or pericardial effusions. Small hiatal hernia.    There has been a prior cholecystectomy. Liver, spleen, pancreas, and adrenal glands are normal. There are multiple small nonobstructing stones in both kidneys. No hydronephrosis is seen. There has been a prior hysterectomy.    There is circumferential wall thickening which involves the transverse, descending, and sigmoid colon. The appendix is not visualized. The stomach and small bowel are unremarkable. No free air or free fluid is evident.    There is a 3.8 cm infrarenal abdominal aortic aneurysm. Post surgical changes in the bilateral hip joints are noted. There are mild degenerative changes of the spine. No pathologic body wall hernia is seen.    IMPRESSION:    1.  Findings suggestive of acute colitis involving the distal colon, of infectious or inflammatory etiology.  2.  Multiple bilateral nonobstructing renal stones.    Electronically signed by:  Lionel Huber MD  10/25/2020 9:32 PM CDT Workstation: [id number]                             CT Cervical Spine Without Contrast (Final result)  Result time 10/25/20 20:03:00    Final result by Lionel Huber MD (10/25/20 20:03:00)                 Narrative:    CT BRAIN AND CERVICAL SPINE    HISTORY: Trauma.    COMPARISON: None.    TECHNIQUE: CT scan of the brain and cervical spine was performed without IV contrast. This exam was performed according to our departmental dose-optimization program, which includes automated exposure control, adjustment of the mA and/or kV according to patient size and/or use of iterative reconstruction  technique.    FINDINGS:    BRAIN:    There are scattered areas of hypoattenuation within the periventricular white matter, which likely represent chronic microvascular ischemia. No evidence of acute infarction, intracranial hemorrhage, extra-axial fluid collection, or midline shift. No air-fluid levels are seen in the paranasal sinuses to suggest acute sinusitis. No depressed skull fracture.    CERVICAL SPINE:    No acute cervical fracture or prevertebral soft tissue swelling. There is straightening of the normal cervical lordosis, which may be due to cervical collar, muscle spasm, or patient positioning. There is multilevel degenerative disc disease as well as facet DJD throughout the cervical spine. There are multilevel disc bulges but without advanced canal stenosis identified.    IMPRESSION:    1.  No acute intracranial hemorrhage.  2.  No acute fracture or subluxation of the cervical spine.    Electronically signed by:  Lionel Huber MD  10/25/2020 9:28 PM CDT Workstation: [id number]                             CT Head Without Contrast (Final result)  Result time 10/25/20 20:03:00    Final result by Lionel Huber MD (10/25/20 20:03:00)                 Narrative:    CT BRAIN AND CERVICAL SPINE    HISTORY: Trauma.    COMPARISON: None.    TECHNIQUE: CT scan of the brain and cervical spine was performed without IV contrast. This exam was performed according to our departmental dose-optimization program, which includes automated exposure control, adjustment of the mA and/or kV according to patient size and/or use of iterative reconstruction technique.    FINDINGS:    BRAIN:    There are scattered areas of hypoattenuation within the periventricular white matter, which likely represent chronic microvascular ischemia. No evidence of acute infarction, intracranial hemorrhage, extra-axial fluid collection, or midline shift. No air-fluid levels are seen in the paranasal sinuses to suggest acute sinusitis. No depressed skull  fracture.    CERVICAL SPINE:    No acute cervical fracture or prevertebral soft tissue swelling. There is straightening of the normal cervical lordosis, which may be due to cervical collar, muscle spasm, or patient positioning. There is multilevel degenerative disc disease as well as facet DJD throughout the cervical spine. There are multilevel disc bulges but without advanced canal stenosis identified.    IMPRESSION:    1.  No acute intracranial hemorrhage.  2.  No acute fracture or subluxation of the cervical spine.    Electronically signed by:  Lionel Huber MD  10/25/2020 9:28 PM CDT Workstation: [id number]                             X-Ray Chest AP Portable (In process)                  Medical Decision Making:   History:   Old Medical Records: I decided to obtain old medical records.  Initial Assessment:   Emergent evaluation of a 89-year-old female presenting after collapsing to the floor with an episode of vomiting and diarrhea differential diagnosis includes food poisoning, dehydration, electrolyte abnormality, infection, obstruction, intracranial abnormality, ACS              Attending Attestation:             Attending ED Notes:   Patient's family is here they report that they are concerned for possible GI bleed because patient was producing black tarry stool when they found her on the ground covered in stool and vomiting.  Patient reassessed she currently says that she is feeling better however she still feels very weak and tired.    Rectal exam performed with nurse as chaperone, patient has dark stool concerning for melena, given this I am further concern for GI bleed, patient will likely require admission to hospital.                    Clinical Impression:       ICD-10-CM ICD-9-CM   1. Gastrointestinal hemorrhage, unspecified gastrointestinal hemorrhage type  K92.2 578.9   2. Nausea  R11.0 787.02   3. Diarrhea, unspecified type  R19.7 787.91   4. Dehydration  E86.0 276.51   5. Acute colitis  K52.9 558.9                           ED Disposition Condition    Observation                             Francis Larsen MD  10/26/20 [id number]

## 2020-10-26 NOTE — ANESTHESIA PREPROCEDURE EVALUATION
10/26/2020  Blaire Cage is a 89 y.o., female.    Patient Active Problem List   Diagnosis    Diabetic neuropathy, type II diabetes mellitus    CKD (chronic kidney disease), stage III, eGFR 39, progressive 31    Hypothyroidism    Hypertension associated with diabetes    Hyperlipidemia associated with type 2 diabetes mellitus    Type 2 diabetes mellitus with stage 4 chronic kidney disease    Right carotid bruit    COPD mixed type    Anxiety    Abdominal aortic aneurysm without rupture    Hip arthritis    Degenerative spinal arthritis    Osteoporosis    Left ventricular diastolic dysfunction with preserved systolic function    Hypertensive left ventricular hypertrophy, without heart failure    Smoker, quit 5/2016, 25 pck-years    Abdominal obesity    Absolute anemia    Syncopal episodes, last 4/2020    Body mass index (BMI) 23.0-23.9, adult, today 24.1    Iron deficiency anemia due to chronic blood loss    Proteinuria due to type 2 diabetes mellitus    History of syncope in childhood    Prerenal azotemia    Drug-induced constipation    Asthmatic bronchitis with acute exacerbation    Controlled type 2 diabetes mellitus, without long-term current use of insulin    Acute pulmonary embolism    Asthma-COPD overlap syndrome    Eosinophilic asthma    Moderate malnutrition    Type 2 diabetes mellitus with kidney complication, without long-term current use of insulin    Chronic anticoagulation    Constipation    DVT (deep venous thrombosis)    History of pulmonary embolism    Anemia due to multiple mechanisms    Chronic anemia    Normocytic normochromic anemia    Anemia, chronic renal failure, stage 3 (moderate)    Homocysteinemia    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    Hyperkalemia    Diastolic CHF, chronic    Anemia of chronic disease    Kidney stones    Macrocytic  anemia    Orthostatic hypotension    Closed left hip fracture    Hip pain, left    Chronic respiratory failure with hypoxia    Nonrheumatic aortic valve stenosis    Mitral stenosis    Hypoalbuminemia    Hypoglycemia    CKD (chronic kidney disease), stage IV    Vertigo    Left foot pain    Posterior tibial tendon dysfunction, left    Arthritis    Pulmonary hypertension    Diastolic dysfunction    Status post placement of implantable loop recorder    Acute colitis    GI bleed       Past Surgical History:   Procedure Laterality Date    APPENDECTOMY      CHOLECYSTECTOMY      FRACTURE SURGERY      right hip     HERNIA REPAIR      groin    HYSTERECTOMY      INSERTION OF IMPLANTABLE LOOP RECORDER N/A 12/12/2018    Procedure: Insertion, Implantable Loop Recorder;  Surgeon: Ashok Herbert MD;  Location: [facility] CATH LAB;  Service: Cardiology;  Laterality: N/A;    PERCUTANEOUS PINNING OF HIP Left 3/23/2019    Procedure: PINNING, HIP, PERCUTANEOUS;  Surgeon: Jorje Hamlin MD;  Location: [facility] OR;  Service: Orthopedics;  Laterality: Left;    VARICOSE VEIN SURGERY          Tobacco Use:  The patient  reports that she quit smoking about 5 years ago. Her smoking use included cigarettes. She has a 15.40 pack-year smoking history. She has never used smokeless tobacco.     Results for orders placed or performed during the hospital encounter of 10/25/20   EKG 12-lead    Collection Time: 10/25/20  8:05 PM    Narrative    Test Reason : R11.0,    Vent. Rate : 067 BPM     Atrial Rate : 067 BPM     P-R Int : 192 ms          QRS Dur : 088 ms      QT Int : 452 ms       P-R-T Axes : 065 029 060 degrees     QTc Int : 477 ms    Normal sinus rhythm  Anterior infarct ,age undetermined  Abnormal ECG  When compared with ECG of 18-SEP-2020 18:34,  No significant change was found    Referred By: [email or web address]   SELF           Confirmed By:         Imaging Results          CT Renal Stone Study ABD Pelvis WO (Final result)   Result time 10/25/20 20:34:00    Final result by Lionel Huber MD (10/25/20 20:34:00)                 Narrative:    CT ABDOMEN PELVIS WITHOUT IV CONTRAST    HISTORY: Flank pain.    COMPARISON: 9/18/2020    TECHNIQUE: CT scan of the abdomen and pelvis was performed without IV contrast. This exam was performed according to our departmental dose-optimization program, which includes automated exposure control, adjustment of the mA and/or kV according to patient size and/or use of iterative reconstruction technique.    FINDINGS:    Mild scarring in the right lower lobe but without pleural or pericardial effusions. Small hiatal hernia.    There has been a prior cholecystectomy. Liver, spleen, pancreas, and adrenal glands are normal. There are multiple small nonobstructing stones in both kidneys. No hydronephrosis is seen. There has been a prior hysterectomy.    There is circumferential wall thickening which involves the transverse, descending, and sigmoid colon. The appendix is not visualized. The stomach and small bowel are unremarkable. No free air or free fluid is evident.    There is a 3.8 cm infrarenal abdominal aortic aneurysm. Post surgical changes in the bilateral hip joints are noted. There are mild degenerative changes of the spine. No pathologic body wall hernia is seen.    IMPRESSION:    1.  Findings suggestive of acute colitis involving the distal colon, of infectious or inflammatory etiology.  2.  Multiple bilateral nonobstructing renal stones.    Electronically signed by:  Lionel Huber MD  10/25/2020 9:32 PM CDT Workstation: [id number]                             CT Cervical Spine Without Contrast (Final result)  Result time 10/25/20 20:03:00    Final result by Lionel Huber MD (10/25/20 20:03:00)                 Narrative:    CT BRAIN AND CERVICAL SPINE    HISTORY: Trauma.    COMPARISON: None.    TECHNIQUE: CT scan of the brain and cervical spine was performed without IV contrast. This exam was performed according  to our departmental dose-optimization program, which includes automated exposure control, adjustment of the mA and/or kV according to patient size and/or use of iterative reconstruction technique.    FINDINGS:    BRAIN:    There are scattered areas of hypoattenuation within the periventricular white matter, which likely represent chronic microvascular ischemia. No evidence of acute infarction, intracranial hemorrhage, extra-axial fluid collection, or midline shift. No air-fluid levels are seen in the paranasal sinuses to suggest acute sinusitis. No depressed skull fracture.    CERVICAL SPINE:    No acute cervical fracture or prevertebral soft tissue swelling. There is straightening of the normal cervical lordosis, which may be due to cervical collar, muscle spasm, or patient positioning. There is multilevel degenerative disc disease as well as facet DJD throughout the cervical spine. There are multilevel disc bulges but without advanced canal stenosis identified.    IMPRESSION:    1.  No acute intracranial hemorrhage.  2.  No acute fracture or subluxation of the cervical spine.    Electronically signed by:  Lionel Huber MD  10/25/2020 9:28 PM CDT Workstation: [id number]                             CT Head Without Contrast (Final result)  Result time 10/25/20 20:03:00    Final result by Lionel Huber MD (10/25/20 20:03:00)                 Narrative:    CT BRAIN AND CERVICAL SPINE    HISTORY: Trauma.    COMPARISON: None.    TECHNIQUE: CT scan of the brain and cervical spine was performed without IV contrast. This exam was performed according to our departmental dose-optimization program, which includes automated exposure control, adjustment of the mA and/or kV according to patient size and/or use of iterative reconstruction technique.    FINDINGS:    BRAIN:    There are scattered areas of hypoattenuation within the periventricular white matter, which likely represent chronic microvascular ischemia. No evidence of acute  infarction, intracranial hemorrhage, extra-axial fluid collection, or midline shift. No air-fluid levels are seen in the paranasal sinuses to suggest acute sinusitis. No depressed skull fracture.    CERVICAL SPINE:    No acute cervical fracture or prevertebral soft tissue swelling. There is straightening of the normal cervical lordosis, which may be due to cervical collar, muscle spasm, or patient positioning. There is multilevel degenerative disc disease as well as facet DJD throughout the cervical spine. There are multilevel disc bulges but without advanced canal stenosis identified.    IMPRESSION:    1.  No acute intracranial hemorrhage.  2.  No acute fracture or subluxation of the cervical spine.    Electronically signed by:  Lionel Huber MD  10/25/2020 9:28 PM CDT Workstation: [id number]                             X-Ray Chest AP Portable (Final result)  Result time 10/25/20 20:27:01    Final result by Meena Watt MD (10/25/20 20:27:01)                 Narrative:    PROCEDURE:   XR CHEST AP PORTABLE  dated  10/25/2020 8:20 PM    CLINICAL HISTORY:   Female 89 years of age.   cough    TECHNIQUE: AP view of the chest obtained portably at 8:20 PM.    PREVIOUS STUDIES:  CT abdomen October 25, 2020. Chest radiography May  5, 2020.    FINDINGS:    Mild cardiomegaly is stable. There is no acute pulmonary infiltrate.  Ill-defined opacification projecting over the right lower lung  corresponds with scarring/atelectasis of the right lower lobe  bordering the fissure, seen on the CT exam. There is no pleural  effusion or pneumothorax.    IMPRESSION:    No acute findings.  Right lower lobe scarring/atelectasis.  Mild cardiomegaly.    Electronically Signed by Meena Watt on 10/26/2020 7:32 AM                               Lab Results   Component Value Date    WBC 10.63 10/26/2020    HGB 9.0 (L) 10/26/2020    HCT 27.5 (L) 10/26/2020    MCV 99 (H) 10/26/2020     10/26/2020     BMP  Lab Results   Component Value Date      (L) 10/26/2020    K 4.6 10/26/2020     10/26/2020    CO2 23 10/26/2020    BUN 78 (H) 10/26/2020    CREATININE 2.6 (H) 10/26/2020    CALCIUM 8.4 (L) 10/26/2020    ANIONGAP 11 10/26/2020     (H) 10/26/2020     (H) 10/25/2020     (H) 09/18/2020       Results for orders placed during the hospital encounter of 05/07/20   Echo Color Flow Doppler? Yes; Bubble Contrast? Yes    Narrative · Concentric left ventricular hypertrophy.  · The mean diastolic gradient across the mitral valve is 5 mmHg at a heart   rate of 57 bpm.  · Normal left ventricular systolic function. The estimated ejection   fraction is 65%.  · Grade II (moderate) left ventricular diastolic dysfunction consistent   with pseudonormalization.  · Normal right ventricular systolic function.  · Severe left atrial enlargement.  · Mild right atrial enlargement.  · Mild mitral regurgitation.  · Mild to moderate tricuspid regurgitation.  · Normal central venous pressure (3 mmHg).  · The estimated PA systolic pressure is 50 mmHg.  · Pulmonary hypertension present.  · No PFO on color flow or saline bubble study              Anesthesia Evaluation    I have reviewed the Patient Summary Reports.    I have reviewed the Nursing Notes.    I have reviewed the Medications.     Review of Systems  Anesthesia Hx:  No problems with previous Anesthesia  Denies Family Hx of Anesthesia complications.   Denies Personal Hx of Anesthesia complications.   Social:  Non-Smoker    Hematology/Oncology:  Hematology Normal        EENT/Dental:EENT/Dental Normal   Cardiovascular:   Hypertension CHF  Abdominal Aortic Aneurysm, infrarenal    Pulmonary:   Pneumonia COPD Asthma  Pulmonary Hypertension    Renal/:   Chronic Renal Disease, CRI renal calculi    Hepatic/GI:  Hepatic/GI Normal  Hepatic/GI Symptoms: melena.    Musculoskeletal:   Arthritis (spine)     Neurological:  Neurology Normal    Endocrine:   Diabetes, type 2 Hypothyroidism    Psych:  Psychiatric  Normal              Anesthesia Plan  Type of Anesthesia, risks & benefits discussed:  Anesthesia Type:  MAC  Patient's Preference: MAC  Intra-op Monitoring Plan: standard ASA monitors  Intra-op Monitoring Plan Comments:   Post Op Pain Control Plan: IV/PO Opioids PRN  Post Op Pain Control Plan Comments:   Induction:   IV  Beta Blocker:         Informed Consent: Patient understands risks and agrees with Anesthesia plan.  Questions answered. Anesthesia consent signed with patient.  ASA Score: 3     Day of Surgery Review of History & Physical:    H&P update referred to the provider.         Ready For Surgery From Anesthesia Perspective.

## 2020-10-26 NOTE — [employer] PATIENT INSTRUCTIONS
Discharge Summary/Instructions after an Endoscopic Procedure  Patient Name: Blaire Cage  Patient MRN: [id number]  Patient YOB: 1930 Monday, October 26, 2020  Kareem Gramajo MD  RESTRICTIONS:  During your procedure today, you received medications for sedation.  These   medications may affect your judgment, balance and coordination.  Therefore,   for 24 hours, you have the following restrictions:   - DO NOT drive a car, operate machinery, make legal/financial decisions,   sign important papers or drink alcohol.    ACTIVITY:  Today: no heavy lifting, straining or running due to procedural   sedation/anesthesia.  The following day: return to full activity including work.  DIET:  Eat and drink normally unless instructed otherwise.     TREATMENT FOR COMMON SIDE EFFECTS:  - Mild abdominal pain, nausea, belching, bloating or excessive gas:  rest,   eat lightly and use a heating pad.  - Sore Throat: treat with throat lozenges and/or gargle with warm salt   water.  - Because air was used during the procedure, expelling large amounts of air   from your rectum or belching is normal.  - If a bowel prep was taken, you may not have a bowel movement for 1-3 days.    This is normal.  SYMPTOMS TO WATCH FOR AND REPORT TO YOUR PHYSICIAN:  1. Abdominal pain or bloating, other than gas cramps.  2. Chest pain.  3. Back pain.  4. Signs of infection such as: chills or fever occurring within 24 hours   after the procedure.  5. Rectal bleeding, which would show as bright red, maroon, or black stools.   (A tablespoon of blood from the rectum is not serious, especially if   hemorrhoids are present.)  6. Vomiting.  7. Weakness or dizziness.  GO DIRECTLY TO THE NEAREST EMERGENCY ROOM IF YOU HAVE ANY OF THE FOLLOWING:      Difficulty breathing              Chills and/or fever over 101 F   Persistent vomiting and/or vomiting blood   Severe abdominal pain   Severe chest pain   Black, tarry stools   Bleeding- more than one  tablespoon   Any other symptom or condition that you feel may need urgent attention  Your doctor recommends these additional instructions:  If any biopsies were taken, your doctors clinic will contact you in 1 to 2   weeks with any results.  - Return patient to hospital [facility] for ongoing care.   - Diet as tolerated, NPO Tuesday night for EGD wed with ablation of avms  For questions, problems or results please call your physician - Kareem Gramajo MD at Work:  [phone].  [facility], EMERGENCY ROOM PHONE NUMBER: [phone]  IF A COMPLICATION OR EMERGENCY SITUATION ARISES AND YOU ARE UNABLE TO REACH   YOUR PHYSICIAN - GO DIRECTLY TO THE EMERGENCY ROOM.  MD Kareem Toney MD  10/26/2020 9:26:40 AM  This report has been verified and signed electronically.  [employer]

## 2020-10-27 ENCOUNTER — ANESTHESIA EVENT (OUTPATIENT)
Dept: SURGERY | Facility: HOSPITAL | Age: 85
DRG: 378 | End: 2020-10-27
Payer: MEDICARE

## 2020-10-27 LAB
ANION GAP SERPL CALC-SCNC: 5 MMOL/L (ref 8–16)
BASOPHILS # BLD AUTO: 0.02 K/UL (ref 0–0.2)
BASOPHILS NFR BLD: 0.2 % (ref 0–1.9)
BUN SERPL-MCNC: 59 MG/DL (ref 8–23)
CALCIUM SERPL-MCNC: 8.2 MG/DL (ref 8.7–10.5)
CHLORIDE SERPL-SCNC: 110 MMOL/L (ref 95–110)
CO2 SERPL-SCNC: 23 MMOL/L (ref 23–29)
CREAT SERPL-MCNC: 2 MG/DL (ref 0.5–1.4)
DIFFERENTIAL METHOD: ABNORMAL
EOSINOPHIL # BLD AUTO: 1 K/UL (ref 0–0.5)
EOSINOPHIL NFR BLD: 11 % (ref 0–8)
ERYTHROCYTE [DISTWIDTH] IN BLOOD BY AUTOMATED COUNT: 13.9 % (ref 11.5–14.5)
EST. GFR  (AFRICAN AMERICAN): 24.8 ML/MIN/1.73 M^2
EST. GFR  (NON AFRICAN AMERICAN): 21.5 ML/MIN/1.73 M^2
GLUCOSE SERPL-MCNC: 109 MG/DL (ref 70–110)
HCT VFR BLD AUTO: 24.8 % (ref 37–48.5)
HGB BLD-MCNC: 8 G/DL (ref 12–16)
IMM GRANULOCYTES # BLD AUTO: 0.05 K/UL (ref 0–0.04)
IMM GRANULOCYTES NFR BLD AUTO: 0.5 % (ref 0–0.5)
LYMPHOCYTES # BLD AUTO: 1.1 K/UL (ref 1–4.8)
LYMPHOCYTES NFR BLD: 12.1 % (ref 18–48)
MAGNESIUM SERPL-MCNC: 2.2 MG/DL (ref 1.6–2.6)
MCH RBC QN AUTO: 32.8 PG (ref 27–31)
MCHC RBC AUTO-ENTMCNC: 32.3 G/DL (ref 32–36)
MCV RBC AUTO: 102 FL (ref 82–98)
MONOCYTES # BLD AUTO: 0.9 K/UL (ref 0.3–1)
MONOCYTES NFR BLD: 9.7 % (ref 4–15)
NEUTROPHILS # BLD AUTO: 6.3 K/UL (ref 1.8–7.7)
NEUTROPHILS NFR BLD: 66.5 % (ref 38–73)
NRBC BLD-RTO: 0 /100 WBC
PLATELET # BLD AUTO: 134 K/UL (ref 150–350)
PMV BLD AUTO: 11.3 FL (ref 9.2–12.9)
POTASSIUM SERPL-SCNC: 3.9 MMOL/L (ref 3.5–5.1)
RBC # BLD AUTO: 2.44 M/UL (ref 4–5.4)
SODIUM SERPL-SCNC: 138 MMOL/L (ref 136–145)
WBC # BLD AUTO: 9.42 K/UL (ref 3.9–12.7)

## 2020-10-27 PROCEDURE — 63600175 PHARM REV CODE 636 W HCPCS: Performed by: NURSE PRACTITIONER

## 2020-10-27 PROCEDURE — 94761 N-INVAS EAR/PLS OXIMETRY MLT: CPT

## 2020-10-27 PROCEDURE — G0378 HOSPITAL OBSERVATION PER HR: HCPCS

## 2020-10-27 PROCEDURE — C9113 INJ PANTOPRAZOLE SODIUM, VIA: HCPCS | Performed by: NURSE PRACTITIONER

## 2020-10-27 PROCEDURE — S0030 INJECTION, METRONIDAZOLE: HCPCS | Performed by: NURSE PRACTITIONER

## 2020-10-27 PROCEDURE — 80048 BASIC METABOLIC PNL TOTAL CA: CPT

## 2020-10-27 PROCEDURE — 99900035 HC TECH TIME PER 15 MIN (STAT)

## 2020-10-27 PROCEDURE — 36415 COLL VENOUS BLD VENIPUNCTURE: CPT

## 2020-10-27 PROCEDURE — 85025 COMPLETE CBC W/AUTO DIFF WBC: CPT

## 2020-10-27 PROCEDURE — 25000003 PHARM REV CODE 250: Performed by: NURSE PRACTITIONER

## 2020-10-27 PROCEDURE — 83735 ASSAY OF MAGNESIUM: CPT

## 2020-10-27 RX ADMIN — SIMVASTATIN 40 MG: 40 TABLET, FILM COATED ORAL at 08:10

## 2020-10-27 RX ADMIN — SERTRALINE HYDROCHLORIDE 25 MG: 25 TABLET ORAL at 09:10

## 2020-10-27 RX ADMIN — DEXTROSE 8 MG/HR: 50 INJECTION, SOLUTION INTRAVENOUS at 03:10

## 2020-10-27 RX ADMIN — AMLODIPINE BESYLATE 5 MG: 5 TABLET ORAL at 09:10

## 2020-10-27 RX ADMIN — METRONIDAZOLE 500 MG: 500 INJECTION, SOLUTION INTRAVENOUS at 09:10

## 2020-10-27 RX ADMIN — DEXTROSE 8 MG/HR: 50 INJECTION, SOLUTION INTRAVENOUS at 05:10

## 2020-10-27 RX ADMIN — METRONIDAZOLE 500 MG: 500 INJECTION, SOLUTION INTRAVENOUS at 05:10

## 2020-10-27 RX ADMIN — ALLOPURINOL 100 MG: 100 TABLET ORAL at 09:10

## 2020-10-27 RX ADMIN — DEXTROSE 8 MG/HR: 50 INJECTION, SOLUTION INTRAVENOUS at 10:10

## 2020-10-27 RX ADMIN — THERA TABS 1 TABLET: TAB at 09:10

## 2020-10-27 RX ADMIN — MAGNESIUM OXIDE 400 MG: 400 TABLET ORAL at 09:10

## 2020-10-27 RX ADMIN — ACETAMINOPHEN 650 MG: 325 TABLET, FILM COATED ORAL at 04:10

## 2020-10-27 RX ADMIN — FLUTICASONE PROPIONATE 100 MCG: 50 SPRAY, METERED NASAL at 09:10

## 2020-10-27 RX ADMIN — FERROUS SULFATE TAB 325 MG (65 MG ELEMENTAL FE) 325 MG: 325 (65 FE) TAB at 09:10

## 2020-10-27 RX ADMIN — METRONIDAZOLE 500 MG: 500 INJECTION, SOLUTION INTRAVENOUS at 01:10

## 2020-10-27 RX ADMIN — MONTELUKAST 10 MG: 10 TABLET, FILM COATED ORAL at 08:10

## 2020-10-27 RX ADMIN — AMLODIPINE BESYLATE 5 MG: 5 TABLET ORAL at 08:10

## 2020-10-27 RX ADMIN — FERROUS SULFATE TAB 325 MG (65 MG ELEMENTAL FE) 325 MG: 325 (65 FE) TAB at 04:10

## 2020-10-27 RX ADMIN — LEVOTHYROXINE SODIUM 88 MCG: 88 TABLET ORAL at 06:10

## 2020-10-27 RX ADMIN — GABAPENTIN 300 MG: 300 CAPSULE ORAL at 08:10

## 2020-10-27 NOTE — ANESTHESIA PREPROCEDURE EVALUATION
10/27/2020  Blaire Cage is a 89 y.o., female.    Patient Active Problem List   Diagnosis    Diabetic neuropathy, type II diabetes mellitus    CKD (chronic kidney disease), stage III, eGFR 39, progressive 31    Hypothyroidism    Hypertension associated with diabetes    Hyperlipidemia associated with type 2 diabetes mellitus    Type 2 diabetes mellitus with stage 4 chronic kidney disease    Right carotid bruit    COPD mixed type    Anxiety    Abdominal aortic aneurysm without rupture    Hip arthritis    Degenerative spinal arthritis    Osteoporosis    Left ventricular diastolic dysfunction with preserved systolic function    Hypertensive left ventricular hypertrophy, without heart failure    Smoker, quit 5/2016, 25 pck-years    Abdominal obesity    Absolute anemia    Syncopal episodes, last 4/2020    Body mass index (BMI) 23.0-23.9, adult, today 24.1    Iron deficiency anemia due to chronic blood loss    Proteinuria due to type 2 diabetes mellitus    History of syncope in childhood    Prerenal azotemia    Drug-induced constipation    Asthmatic bronchitis with acute exacerbation    Controlled type 2 diabetes mellitus, without long-term current use of insulin    Acute pulmonary embolism    Asthma-COPD overlap syndrome    Eosinophilic asthma    Moderate malnutrition    Type 2 diabetes mellitus with kidney complication, without long-term current use of insulin    Chronic anticoagulation    Constipation    DVT (deep venous thrombosis)    History of pulmonary embolism    Anemia due to multiple mechanisms    Chronic anemia    Normocytic normochromic anemia    Anemia, chronic renal failure, stage 3 (moderate)    Homocysteinemia    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    Hyperkalemia    Diastolic CHF, chronic    Anemia of chronic disease    Kidney stones    Macrocytic  anemia    Orthostatic hypotension    Closed left hip fracture    Hip pain, left    Chronic respiratory failure with hypoxia    Nonrheumatic aortic valve stenosis    Mitral stenosis    Hypoalbuminemia    Hypoglycemia    CKD (chronic kidney disease), stage IV    Vertigo    Left foot pain    Posterior tibial tendon dysfunction, left    Arthritis    Pulmonary hypertension    Diastolic dysfunction    Status post placement of implantable loop recorder    Acute colitis    GI bleed       Past Surgical History:   Procedure Laterality Date    APPENDECTOMY      CHOLECYSTECTOMY      ESOPHAGOGASTRODUODENOSCOPY Left 10/26/2020    Procedure: EGD (ESOPHAGOGASTRODUODENOSCOPY);  Surgeon: Kareem Gramajo MD;  Location: [facility] ENDO;  Service: Endoscopy;  Laterality: Left;    FRACTURE SURGERY      right hip     HERNIA REPAIR      groin    HYSTERECTOMY      INSERTION OF IMPLANTABLE LOOP RECORDER N/A 12/12/2018    Procedure: Insertion, Implantable Loop Recorder;  Surgeon: Ashok Herbert MD;  Location: [facility] CATH LAB;  Service: Cardiology;  Laterality: N/A;    PERCUTANEOUS PINNING OF HIP Left 3/23/2019    Procedure: PINNING, HIP, PERCUTANEOUS;  Surgeon: Jorje Hamlin MD;  Location: [facility] OR;  Service: Orthopedics;  Laterality: Left;    VARICOSE VEIN SURGERY          Tobacco Use:  The patient  reports that she quit smoking about 5 years ago. Her smoking use included cigarettes. She has a 15.40 pack-year smoking history. She has never used smokeless tobacco.     Results for orders placed or performed during the hospital encounter of 10/25/20   EKG 12-lead    Collection Time: 10/25/20  8:05 PM    Narrative    Test Reason : R11.0,    Vent. Rate : 067 BPM     Atrial Rate : 067 BPM     P-R Int : 192 ms          QRS Dur : 088 ms      QT Int : 452 ms       P-R-T Axes : 065 029 060 degrees     QTc Int : 477 ms    Normal sinus rhythm  Anterior infarct ,age undetermined clockwise rotation     Abnormal ECG  When compared  with ECG of 18-SEP-2020 18:34,  No significant change was found  Confirmed by Watson BEST, Luis Carlos LAWRENCE ([id number]) on 10/26/2020 9:21:31 AM    Referred By: [email or web address]   SELF           Confirmed By:Luis Carlos Chaudhari MD             Lab Results   Component Value Date    WBC 9.42 10/27/2020    HGB 8.0 (L) 10/27/2020    HCT 24.8 (L) 10/27/2020     (H) 10/27/2020     (L) 10/27/2020     BMP  Lab Results   Component Value Date     10/27/2020    K 3.9 10/27/2020     10/27/2020    CO2 23 10/27/2020    BUN 59 (H) 10/27/2020    CREATININE 2.0 (H) 10/27/2020    CALCIUM 8.2 (L) 10/27/2020    ANIONGAP 5 (L) 10/27/2020     10/27/2020     (H) 10/26/2020     (H) 10/25/2020       Results for orders placed during the hospital encounter of 05/07/20   Echo Color Flow Doppler? Yes; Bubble Contrast? Yes    Narrative · Concentric left ventricular hypertrophy.  · The mean diastolic gradient across the mitral valve is 5 mmHg at a heart   rate of 57 bpm.  · Normal left ventricular systolic function. The estimated ejection   fraction is 65%.  · Grade II (moderate) left ventricular diastolic dysfunction consistent   with pseudonormalization.  · Normal right ventricular systolic function.  · Severe left atrial enlargement.  · Mild right atrial enlargement.  · Mild mitral regurgitation.  · Mild to moderate tricuspid regurgitation.  · Normal central venous pressure (3 mmHg).  · The estimated PA systolic pressure is 50 mmHg.  · Pulmonary hypertension present.  · No PFO on color flow or saline bubble study              Pre-op Assessment    I have reviewed the Patient Summary Reports.     I have reviewed the Nursing Notes.    I have reviewed the Medications.     Review of Systems  Anesthesia Hx:  No problems with previous Anesthesia  Denies Family Hx of Anesthesia complications.   Denies Personal Hx of Anesthesia complications.   Social:  Non-Smoker    Hematology/Oncology:         -- Anemia: -- Coag Disorders:  Bleeding Disorder (history of PE on Eliquis (stopped for EGD procedure)):    EENT/Dental:EENT/Dental Normal   Cardiovascular:   Hypertension CHF  Abdominal Aortic Aneurysm, infrarenal    Pulmonary:   Pneumonia COPD Asthma  Pulmonary Hypertension    Renal/:   Chronic Renal Disease, CRI renal calculi    Hepatic/GI:  Hepatic/GI Normal  Hepatic/GI Symptoms: melena.    Musculoskeletal:   Arthritis (spine)     Neurological:  Neurology Normal    Endocrine:   Diabetes, type 2 Hypothyroidism    Psych:  Psychiatric Normal           Physical Exam  General:  Well nourished    Airway/Jaw/Neck:  Airway Findings: Mouth Opening: Normal Tongue: Normal  General Airway Assessment: Adult  Mallampati: II  TM Distance: Normal, at least 6 cm  Jaw/Neck Findings:  Neck ROM: Normal ROM     Eyes/Ears/Nose:  Eyes/Ears/Nose Findings:    Dental:  Dental Findings: In tact   Chest/Lungs:  Chest/Lungs Findings: Clear to auscultation, Normal Respiratory Rate     Heart/Vascular:  Heart Findings: Rate: Normal  Rhythm: Regular Rhythm  Sounds: Normal     Abdomen:  Abdomen Findings:     Musculoskeletal:  Musculoskeletal Findings:    Skin:  Skin Findings:     Mental Status:  Mental Status Findings:  Cooperative, Alert and Oriented         Anesthesia Plan  Type of Anesthesia, risks & benefits discussed:  Anesthesia Type:  MAC  Patient's Preference: MAC  Intra-op Monitoring Plan: standard ASA monitors  Intra-op Monitoring Plan Comments:   Post Op Pain Control Plan: IV/PO Opioids PRN  Post Op Pain Control Plan Comments:   Induction:   IV  Beta Blocker:         Informed Consent: Patient understands risks and agrees with Anesthesia plan.  Questions answered. Anesthesia consent signed with patient.  ASA Score: 3     Day of Surgery Review of History & Physical:    H&P update referred to the provider.         Ready For Surgery From Anesthesia Perspective.

## 2020-10-27 NOTE — PLAN OF CARE
Problem: Fall Injury Risk  Goal: Absence of Fall and Fall-Related Injury  Outcome: Ongoing, Progressing     Problem: Adult Inpatient Plan of Care  Goal: Plan of Care Review  Outcome: Ongoing, Progressing  Goal: Patient-Specific Goal (Individualization)  Outcome: Ongoing, Progressing  Goal: Absence of Hospital-Acquired Illness or Injury  Outcome: Ongoing, Progressing  Goal: Optimal Comfort and Wellbeing  Outcome: Ongoing, Progressing  Goal: Readiness for Transition of Care  Outcome: Ongoing, Progressing  Goal: Rounds/Family Conference  Outcome: Ongoing, Progressing     Problem: Infection  Goal: Infection Symptom Resolution  Outcome: Ongoing, Progressing     Problem: Diabetes Comorbidity  Goal: Blood Glucose Level Within Desired Range  Outcome: Ongoing, Progressing     Problem: Skin Injury Risk Increased  Goal: Skin Health and Integrity  Outcome: Ongoing, Progressing

## 2020-10-27 NOTE — PLAN OF CARE
10/27/20 0920   PRE-TX-O2   O2 Device (Oxygen Therapy) room air   SpO2 96 %   Pulse Oximetry Type Intermittent   $ Pulse Oximetry - Multiple Charge Pulse Oximetry - Multiple   Pulse 80   Resp 18   Inhaler   $ Inhaler Charges Other (see comments)  (HOME MED NOT AVAILABLE)   Respiratory Treatment Status (Inhaler) PRN treatment not required   Respiratory Evaluation   $ Care Plan Tech Time 15 min

## 2020-10-28 ENCOUNTER — ANESTHESIA (OUTPATIENT)
Dept: SURGERY | Facility: HOSPITAL | Age: 85
DRG: 378 | End: 2020-10-28
Payer: MEDICARE

## 2020-10-28 PROBLEM — K92.2 GASTROINTESTINAL HEMORRHAGE: Status: ACTIVE | Noted: 2020-10-28

## 2020-10-28 LAB
ANION GAP SERPL CALC-SCNC: 8 MMOL/L (ref 8–16)
BASOPHILS # BLD AUTO: 0.03 K/UL (ref 0–0.2)
BASOPHILS NFR BLD: 0.3 % (ref 0–1.9)
BUN SERPL-MCNC: 44 MG/DL (ref 8–23)
CALCIUM SERPL-MCNC: 8.4 MG/DL (ref 8.7–10.5)
CHLORIDE SERPL-SCNC: 113 MMOL/L (ref 95–110)
CO2 SERPL-SCNC: 21 MMOL/L (ref 23–29)
CREAT SERPL-MCNC: 1.6 MG/DL (ref 0.5–1.4)
DIFFERENTIAL METHOD: ABNORMAL
EOSINOPHIL # BLD AUTO: 1.3 K/UL (ref 0–0.5)
EOSINOPHIL NFR BLD: 14.9 % (ref 0–8)
ERYTHROCYTE [DISTWIDTH] IN BLOOD BY AUTOMATED COUNT: 14.1 % (ref 11.5–14.5)
EST. GFR  (AFRICAN AMERICAN): 32.5 ML/MIN/1.73 M^2
EST. GFR  (NON AFRICAN AMERICAN): 28.2 ML/MIN/1.73 M^2
GLUCOSE SERPL-MCNC: 109 MG/DL (ref 70–110)
HCT VFR BLD AUTO: 25.9 % (ref 37–48.5)
HGB BLD-MCNC: 8.1 G/DL (ref 12–16)
IMM GRANULOCYTES # BLD AUTO: 0.04 K/UL (ref 0–0.04)
IMM GRANULOCYTES NFR BLD AUTO: 0.5 % (ref 0–0.5)
LYMPHOCYTES # BLD AUTO: 1.1 K/UL (ref 1–4.8)
LYMPHOCYTES NFR BLD: 12.5 % (ref 18–48)
MAGNESIUM SERPL-MCNC: 2.2 MG/DL (ref 1.6–2.6)
MCH RBC QN AUTO: 31.6 PG (ref 27–31)
MCHC RBC AUTO-ENTMCNC: 31.3 G/DL (ref 32–36)
MCV RBC AUTO: 101 FL (ref 82–98)
MONOCYTES # BLD AUTO: 0.8 K/UL (ref 0.3–1)
MONOCYTES NFR BLD: 9.4 % (ref 4–15)
NEUTROPHILS # BLD AUTO: 5.5 K/UL (ref 1.8–7.7)
NEUTROPHILS NFR BLD: 62.4 % (ref 38–73)
NRBC BLD-RTO: 0 /100 WBC
PLATELET # BLD AUTO: 138 K/UL (ref 150–350)
PMV BLD AUTO: 10.9 FL (ref 9.2–12.9)
POTASSIUM SERPL-SCNC: 4.1 MMOL/L (ref 3.5–5.1)
RBC # BLD AUTO: 2.56 M/UL (ref 4–5.4)
SODIUM SERPL-SCNC: 142 MMOL/L (ref 136–145)
WBC # BLD AUTO: 8.73 K/UL (ref 3.9–12.7)

## 2020-10-28 PROCEDURE — 85025 COMPLETE CBC W/AUTO DIFF WBC: CPT

## 2020-10-28 PROCEDURE — 25000003 PHARM REV CODE 250: Performed by: INTERNAL MEDICINE

## 2020-10-28 PROCEDURE — 88305 TISSUE EXAM BY PATHOLOGIST: CPT | Mod: TC

## 2020-10-28 PROCEDURE — S0030 INJECTION, METRONIDAZOLE: HCPCS | Performed by: NURSE PRACTITIONER

## 2020-10-28 PROCEDURE — 63600175 PHARM REV CODE 636 W HCPCS: Performed by: NURSE ANESTHETIST, CERTIFIED REGISTERED

## 2020-10-28 PROCEDURE — 99900035 HC TECH TIME PER 15 MIN (STAT)

## 2020-10-28 PROCEDURE — 37000008 HC ANESTHESIA 1ST 15 MINUTES: Performed by: INTERNAL MEDICINE

## 2020-10-28 PROCEDURE — 27202049 HC PROBE, APC ERBE: Performed by: INTERNAL MEDICINE

## 2020-10-28 PROCEDURE — 27000671 HC TUBING MICROBORE EXT: Performed by: STUDENT IN AN ORGANIZED HEALTH CARE EDUCATION/TRAINING PROGRAM

## 2020-10-28 PROCEDURE — 25000003 PHARM REV CODE 250: Performed by: NURSE PRACTITIONER

## 2020-10-28 PROCEDURE — 80048 BASIC METABOLIC PNL TOTAL CA: CPT

## 2020-10-28 PROCEDURE — C9113 INJ PANTOPRAZOLE SODIUM, VIA: HCPCS | Performed by: NURSE PRACTITIONER

## 2020-10-28 PROCEDURE — 27200043 HC FORCEPS, BIOPSY: Performed by: INTERNAL MEDICINE

## 2020-10-28 PROCEDURE — 37000009 HC ANESTHESIA EA ADD 15 MINS: Performed by: INTERNAL MEDICINE

## 2020-10-28 PROCEDURE — 43255 EGD CONTROL BLEEDING ANY: CPT | Performed by: INTERNAL MEDICINE

## 2020-10-28 PROCEDURE — 83735 ASSAY OF MAGNESIUM: CPT

## 2020-10-28 PROCEDURE — 12000002 HC ACUTE/MED SURGE SEMI-PRIVATE ROOM

## 2020-10-28 PROCEDURE — 25000003 PHARM REV CODE 250: Performed by: NURSE ANESTHETIST, CERTIFIED REGISTERED

## 2020-10-28 PROCEDURE — 36415 COLL VENOUS BLD VENIPUNCTURE: CPT

## 2020-10-28 PROCEDURE — 43239 EGD BIOPSY SINGLE/MULTIPLE: CPT | Performed by: INTERNAL MEDICINE

## 2020-10-28 PROCEDURE — 63600175 PHARM REV CODE 636 W HCPCS: Performed by: NURSE PRACTITIONER

## 2020-10-28 RX ORDER — SODIUM CHLORIDE 9 MG/ML
INJECTION, SOLUTION INTRAVENOUS CONTINUOUS PRN
Status: DISCONTINUED | OUTPATIENT
Start: 2020-10-28 | End: 2020-10-28

## 2020-10-28 RX ORDER — PROPOFOL 10 MG/ML
VIAL (ML) INTRAVENOUS
Status: DISCONTINUED | OUTPATIENT
Start: 2020-10-28 | End: 2020-10-28

## 2020-10-28 RX ORDER — POLYETHYLENE GLYCOL 3350, SODIUM CHLORIDE, SODIUM BICARBONATE, POTASSIUM CHLORIDE 420; 11.2; 5.72; 1.48 G/4L; G/4L; G/4L; G/4L
4000 POWDER, FOR SOLUTION ORAL ONCE
Status: COMPLETED | OUTPATIENT
Start: 2020-10-28 | End: 2020-10-28

## 2020-10-28 RX ADMIN — PROPOFOL 10 MG: 10 INJECTION, EMULSION INTRAVENOUS at 08:10

## 2020-10-28 RX ADMIN — AMLODIPINE BESYLATE 5 MG: 5 TABLET ORAL at 10:10

## 2020-10-28 RX ADMIN — MONTELUKAST 10 MG: 10 TABLET, FILM COATED ORAL at 08:10

## 2020-10-28 RX ADMIN — MAGNESIUM OXIDE 400 MG: 400 TABLET ORAL at 10:10

## 2020-10-28 RX ADMIN — DEXTROSE 8 MG/HR: 50 INJECTION, SOLUTION INTRAVENOUS at 02:10

## 2020-10-28 RX ADMIN — METRONIDAZOLE 500 MG: 500 INJECTION, SOLUTION INTRAVENOUS at 05:10

## 2020-10-28 RX ADMIN — POLYETHYLENE GLYCOL 3350, SODIUM CHLORIDE, SODIUM BICARBONATE AND POTASSIUM CHLORIDE 4000 ML: 420; 5.72; 11.2; 1.48 POWDER, FOR SOLUTION ORAL at 08:10

## 2020-10-28 RX ADMIN — METRONIDAZOLE 500 MG: 500 INJECTION, SOLUTION INTRAVENOUS at 02:10

## 2020-10-28 RX ADMIN — METRONIDAZOLE 500 MG: 500 INJECTION, SOLUTION INTRAVENOUS at 10:10

## 2020-10-28 RX ADMIN — PROPOFOL 30 MG: 10 INJECTION, EMULSION INTRAVENOUS at 08:10

## 2020-10-28 RX ADMIN — GABAPENTIN 300 MG: 300 CAPSULE ORAL at 08:10

## 2020-10-28 RX ADMIN — AMLODIPINE BESYLATE 5 MG: 5 TABLET ORAL at 08:10

## 2020-10-28 RX ADMIN — FERROUS SULFATE TAB 325 MG (65 MG ELEMENTAL FE) 325 MG: 325 (65 FE) TAB at 05:10

## 2020-10-28 RX ADMIN — SIMVASTATIN 40 MG: 40 TABLET, FILM COATED ORAL at 08:10

## 2020-10-28 RX ADMIN — SODIUM CHLORIDE: 0.9 INJECTION, SOLUTION INTRAVENOUS at 08:10

## 2020-10-28 RX ADMIN — THERA TABS 1 TABLET: TAB at 10:10

## 2020-10-28 RX ADMIN — SERTRALINE HYDROCHLORIDE 25 MG: 25 TABLET ORAL at 10:10

## 2020-10-28 RX ADMIN — FERROUS SULFATE TAB 325 MG (65 MG ELEMENTAL FE) 325 MG: 325 (65 FE) TAB at 10:10

## 2020-10-28 RX ADMIN — ALLOPURINOL 100 MG: 100 TABLET ORAL at 10:10

## 2020-10-28 NOTE — ANESTHESIA POSTPROCEDURE EVALUATION
Anesthesia Post Evaluation    Patient: Blaire Cage    Procedure(s) Performed: Procedure(s) (LRB):  EGD (ESOPHAGOGASTRODUODENOSCOPY) (Left)    Final Anesthesia Type: MAC    Patient location during evaluation: PACU  Patient participation: Yes- Able to Participate  Level of consciousness: awake and alert  Post-procedure vital signs: reviewed and stable  Pain management: adequate  Airway patency: patent    PONV status at discharge: No PONV  Anesthetic complications: no      Cardiovascular status: blood pressure returned to baseline  Respiratory status: unassisted  Hydration status: euvolemic  Follow-up not needed.          Vitals Value Taken Time   /67 10/28/20 0845   Temp 98.0 10/28/20 0850   Pulse 59 10/28/20 0848   Resp 15 10/28/20 0848   SpO2 98% 10/28/20 0848   Vitals shown include unvalidated device data.      No case tracking events are documented in the log.      Pain/Mandeep Score: Pain Rating Prior to Med Admin: 8 (10/27/2020  4:25 PM)  Pain Rating Post Med Admin: 2 (10/27/2020  5:25 PM)  Mandeep Score: 10 (10/28/2020  8:35 AM)

## 2020-10-28 NOTE — [employer] PATIENT INSTRUCTIONS
Discharge Summary/Instructions after an Endoscopic Procedure  Patient Name: Blaire Cage  Patient MRN: [id number]  Patient YOB: 1930 Wednesday, October 28, 2020  Kareem Gramajo MD  RESTRICTIONS:  During your procedure today, you received medications for sedation.  These   medications may affect your judgment, balance and coordination.  Therefore,   for 24 hours, you have the following restrictions:   - DO NOT drive a car, operate machinery, make legal/financial decisions,   sign important papers or drink alcohol.    ACTIVITY:  Today: no heavy lifting, straining or running due to procedural   sedation/anesthesia.  The following day: return to full activity including work.  DIET:  Eat and drink normally unless instructed otherwise.     TREATMENT FOR COMMON SIDE EFFECTS:  - Mild abdominal pain, nausea, belching, bloating or excessive gas:  rest,   eat lightly and use a heating pad.  - Sore Throat: treat with throat lozenges and/or gargle with warm salt   water.  - Because air was used during the procedure, expelling large amounts of air   from your rectum or belching is normal.  - If a bowel prep was taken, you may not have a bowel movement for 1-3 days.    This is normal.  SYMPTOMS TO WATCH FOR AND REPORT TO YOUR PHYSICIAN:  1. Abdominal pain or bloating, other than gas cramps.  2. Chest pain.  3. Back pain.  4. Signs of infection such as: chills or fever occurring within 24 hours   after the procedure.  5. Rectal bleeding, which would show as bright red, maroon, or black stools.   (A tablespoon of blood from the rectum is not serious, especially if   hemorrhoids are present.)  6. Vomiting.  7. Weakness or dizziness.  GO DIRECTLY TO THE NEAREST EMERGENCY ROOM IF YOU HAVE ANY OF THE FOLLOWING:      Difficulty breathing              Chills and/or fever over 101 F   Persistent vomiting and/or vomiting blood   Severe abdominal pain   Severe chest pain   Black, tarry stools   Bleeding- more than one  tablespoon   Any other symptom or condition that you feel may need urgent attention  Your doctor recommends these additional instructions:  If any biopsies were taken, your doctors clinic will contact you in 1 to 2   weeks with any results.  - Return patient to hospital [facility] for ongoing care.   -colon tomorrow  For questions, problems or results please call your physician - Kareem Gramajo MD at Work:  [phone].  [facility], EMERGENCY ROOM PHONE NUMBER: [phone]  IF A COMPLICATION OR EMERGENCY SITUATION ARISES AND YOU ARE UNABLE TO REACH   YOUR PHYSICIAN - GO DIRECTLY TO THE EMERGENCY ROOM.  MD Kareem Toney MD  10/28/2020 8:45:55 AM  This report has been verified and signed electronically.  [employer]

## 2020-10-28 NOTE — RESPIRATORY THERAPY
10/27/20 2050   Patient Assessment/Suction   Respiratory Effort Unlabored   All Lung Fields Breath Sounds clear   Rhythm/Pattern, Respiratory pattern regular   PRE-TX-O2   O2 Device (Oxygen Therapy) room air   SpO2 95 %   Pulse 85   Resp 16   Inhaler   $ Inhaler Charges PRN treatment not required   Respiratory Evaluation   $ Care Plan Tech Time 15 min   Admitting Diagnosis ACUTE COLITIS

## 2020-10-28 NOTE — TRANSFER OF CARE
"Anesthesia Transfer of Care Note    Patient: Blaire Choudharytte    Procedure(s) Performed: Procedure(s) (LRB):  EGD (ESOPHAGOGASTRODUODENOSCOPY) (Left)    Patient location: GI    Anesthesia Type: MAC    Post pain: adequate analgesia    Post assessment: no apparent anesthetic complications    Post vital signs: stable    Level of consciousness: awake, alert and oriented    Nausea/Vomiting: no nausea/vomiting    Complications: none    Transfer of care protocol was followed      Last vitals:   Visit Vitals  BP (!) 179/77 (BP Location: Left arm, Patient Position: Lying)   Pulse 66   Temp 37.3 °C (99.2 °F) (Oral)   Resp 14   Ht 5' 6" (1.676 m)   Wt 59.4 kg (131 lb)   LMP  (LMP Unknown)   SpO2 96%   Breastfeeding No   BMI 21.14 kg/m²     "

## 2020-10-28 NOTE — PROGRESS NOTES
"Critical access hospital Medicine Progress Note  Patient Name: Blaire Cage MRN: [id number]   Patient Class: IP- Inpatient  Length of Stay: 0   Admission Date: 10/25/2020  7:38 PM Attending Physician: Jenaro Graff MD   Primary Care Provider: Eli Edmonds MD Face-to-Face encounter date: 10/28/2020   Chief Complaint: Nausea (last meal at 1200. ), Vomiting, Diarrhea, and Weakness (Pt went to restroom after beginning of incontinence and lowered herself to the floor. Family found her and cleaned her up prior to calling EMS)    Assessment & Plan:   Blaire Cage is a 89 y.o. 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     Subjective:    Interval History   Patient is doing fairly well.    Denies chest pain, shortness of breath, palpitations, abdominal pain, nausea/vomiting.   No concerns/issues overnight reported by the patient or the nursing staff.  Reviewed the labs and discussed the plan of care.   No family present at bedside.     Review of Systems   All other Review of Systems were found to be negative expect for that mentioned already in HPI.   Objective:   Physical Exam  BP (!) (P) 152/56   Pulse (P) 63   Temp (P) 97.7 °F (36.5 °C) (Axillary)   Resp (P) 18   Ht 5' 6" (1.676 m)   Wt 59.4 kg (131 lb)   LMP  (LMP Unknown)   SpO2 (P) 95%   Breastfeeding No   BMI 21.14 kg/m²   Vitals reviewed.    Constitutional: No distress.   HENT: Atraumatic.   Cardiovascular: Normal rate, regular rhythm and normal heart sounds.   Pulmonary/Chest: Effort normal. Clear to auscultation bilaterally. No wheezes.   Abdominal: Soft. Bowel sounds are normal. Exhibits no distension and no mass. No " tenderness  Neurological: Alert.   Skin: Skin is warm and dry.     Following labs were Reviewed   Recent Labs   Lab 10/28/20  0843   WBC 8.73   HGB 8.1*   HCT 25.9*   *   CALCIUM 8.4*      K 4.1   CO2 21*   *   BUN 44*   CREATININE 1.6*     No results found for: POCTGLUCOSE     All labs within the past 24 hours have been reviewed  Microbiology Results (last 7 days)     Procedure Component Value Units Date/Time    Blood culture #1 **CANNOT BE ORDERED STAT** [[id number]] Collected: 10/25/20 2015    Order Status: Completed Specimen: Blood from Peripheral, Antecubital, Right Updated: 10/27/20 2232     Blood Culture, Routine No Growth to date      No Growth to date      No Growth to date    Blood culture #2 **CANNOT BE ORDERED STAT** [[id number]] Collected: 10/25/20 2126    Order Status: Completed Specimen: Blood from Peripheral, Antecubital, Right Updated: 10/27/20 2232     Blood Culture, Routine No Growth to date      No Growth to date      No Growth to date    Clostridium difficile EIA [[id number]]     Order Status: Canceled Specimen: Stool         CT Renal Stone Study ABD Pelvis WO   Final Result      CT Cervical Spine Without Contrast   Final Result      CT Head Without Contrast   Final Result      X-Ray Chest AP Portable   Final Result          Inpatient medications  Scheduled Meds:   allopurinoL  100 mg Oral Daily    amLODIPine  5 mg Oral BID    ferrous sulfate  325 mg Oral BID WM    fluticasone propionate  2 spray Each Nostril Daily    gabapentin  300 mg Oral QHS    levothyroxine  88 mcg Oral Before breakfast    magnesium oxide  400 mg Oral Daily    metronidazole  500 mg Intravenous Q8H    montelukast  10 mg Oral QHS    multivitamin  1 tablet Oral Daily    peg-electrolyte soln  4,000 mL Oral Once    sertraline  25 mg Oral Daily    simvastatin  40 mg Oral QHS     Continuous Infusions:   pantoprozole (PROTONIX) IV infusion 8 mg/hr (10/28/20 5519)     PRN Meds:.acetaminophen, albuterol,  melatonin, ondansetron, polyethylene glycol, sodium chloride 0.9%    Above encounter included review of the medical records, interviewing and examining the patient face-to-face, discussion with family and other health care providers, ordering and interpreting lab/test results and formulating a plan of care.     Medical Decision Making:    [] Low Complexity  [x] Moderate Complexity  [] High Complexity    Jenaro Graff  [facility] Hospitalist  10/28/2020

## 2020-10-29 ENCOUNTER — ANESTHESIA EVENT (OUTPATIENT)
Dept: SURGERY | Facility: HOSPITAL | Age: 85
DRG: 378 | End: 2020-10-29
Payer: MEDICARE

## 2020-10-29 ENCOUNTER — ANESTHESIA (OUTPATIENT)
Dept: SURGERY | Facility: HOSPITAL | Age: 85
DRG: 378 | End: 2020-10-29
Payer: MEDICARE

## 2020-10-29 VITALS
DIASTOLIC BLOOD PRESSURE: 65 MMHG | HEART RATE: 70 BPM | OXYGEN SATURATION: 94 % | SYSTOLIC BLOOD PRESSURE: 142 MMHG | TEMPERATURE: 98 F | RESPIRATION RATE: 18 BRPM | BODY MASS INDEX: 21.05 KG/M2 | WEIGHT: 131 LBS | HEIGHT: 66 IN

## 2020-10-29 LAB
ANION GAP SERPL CALC-SCNC: 10 MMOL/L (ref 8–16)
BASOPHILS # BLD AUTO: 0.03 K/UL (ref 0–0.2)
BASOPHILS NFR BLD: 0.4 % (ref 0–1.9)
BUN SERPL-MCNC: 36 MG/DL (ref 8–23)
CALCIUM SERPL-MCNC: 8.7 MG/DL (ref 8.7–10.5)
CHLORIDE SERPL-SCNC: 112 MMOL/L (ref 95–110)
CO2 SERPL-SCNC: 20 MMOL/L (ref 23–29)
CREAT SERPL-MCNC: 1.4 MG/DL (ref 0.5–1.4)
DIFFERENTIAL METHOD: ABNORMAL
EOSINOPHIL # BLD AUTO: 1.2 K/UL (ref 0–0.5)
EOSINOPHIL NFR BLD: 14.7 % (ref 0–8)
ERYTHROCYTE [DISTWIDTH] IN BLOOD BY AUTOMATED COUNT: 14 % (ref 11.5–14.5)
EST. GFR  (AFRICAN AMERICAN): 38.2 ML/MIN/1.73 M^2
EST. GFR  (NON AFRICAN AMERICAN): 33.1 ML/MIN/1.73 M^2
GLUCOSE SERPL-MCNC: 99 MG/DL (ref 70–110)
HCT VFR BLD AUTO: 25.5 % (ref 37–48.5)
HGB BLD-MCNC: 8.3 G/DL (ref 12–16)
IMM GRANULOCYTES # BLD AUTO: 0.06 K/UL (ref 0–0.04)
IMM GRANULOCYTES NFR BLD AUTO: 0.7 % (ref 0–0.5)
LYMPHOCYTES # BLD AUTO: 1.1 K/UL (ref 1–4.8)
LYMPHOCYTES NFR BLD: 13.5 % (ref 18–48)
MAGNESIUM SERPL-MCNC: 2.3 MG/DL (ref 1.6–2.6)
MCH RBC QN AUTO: 32 PG (ref 27–31)
MCHC RBC AUTO-ENTMCNC: 32.5 G/DL (ref 32–36)
MCV RBC AUTO: 99 FL (ref 82–98)
MONOCYTES # BLD AUTO: 0.7 K/UL (ref 0.3–1)
MONOCYTES NFR BLD: 8.2 % (ref 4–15)
NEUTROPHILS # BLD AUTO: 5.2 K/UL (ref 1.8–7.7)
NEUTROPHILS NFR BLD: 62.5 % (ref 38–73)
NRBC BLD-RTO: 0 /100 WBC
PLATELET # BLD AUTO: 169 K/UL (ref 150–350)
PMV BLD AUTO: 11.9 FL (ref 9.2–12.9)
POTASSIUM SERPL-SCNC: 4 MMOL/L (ref 3.5–5.1)
RBC # BLD AUTO: 2.59 M/UL (ref 4–5.4)
SODIUM SERPL-SCNC: 142 MMOL/L (ref 136–145)
WBC # BLD AUTO: 8.32 K/UL (ref 3.9–12.7)

## 2020-10-29 PROCEDURE — C9113 INJ PANTOPRAZOLE SODIUM, VIA: HCPCS | Performed by: NURSE PRACTITIONER

## 2020-10-29 PROCEDURE — 27000284 HC CANNULA NASAL: Performed by: NURSE ANESTHETIST, CERTIFIED REGISTERED

## 2020-10-29 PROCEDURE — 25000003 PHARM REV CODE 250: Performed by: NURSE PRACTITIONER

## 2020-10-29 PROCEDURE — 25000003 PHARM REV CODE 250: Performed by: NURSE ANESTHETIST, CERTIFIED REGISTERED

## 2020-10-29 PROCEDURE — 44360 SMALL BOWEL ENDOSCOPY: CPT | Performed by: INTERNAL MEDICINE

## 2020-10-29 PROCEDURE — 25000003 PHARM REV CODE 250: Performed by: INTERNAL MEDICINE

## 2020-10-29 PROCEDURE — 63600175 PHARM REV CODE 636 W HCPCS: Performed by: NURSE ANESTHETIST, CERTIFIED REGISTERED

## 2020-10-29 PROCEDURE — 36415 COLL VENOUS BLD VENIPUNCTURE: CPT

## 2020-10-29 PROCEDURE — 27202103: Performed by: NURSE ANESTHETIST, CERTIFIED REGISTERED

## 2020-10-29 PROCEDURE — 85025 COMPLETE CBC W/AUTO DIFF WBC: CPT

## 2020-10-29 PROCEDURE — 63600175 PHARM REV CODE 636 W HCPCS: Performed by: NURSE PRACTITIONER

## 2020-10-29 PROCEDURE — 27000675 HC TUBING MICRODRIP: Performed by: NURSE ANESTHETIST, CERTIFIED REGISTERED

## 2020-10-29 PROCEDURE — 80048 BASIC METABOLIC PNL TOTAL CA: CPT

## 2020-10-29 PROCEDURE — 83735 ASSAY OF MAGNESIUM: CPT

## 2020-10-29 PROCEDURE — 94761 N-INVAS EAR/PLS OXIMETRY MLT: CPT

## 2020-10-29 PROCEDURE — S0030 INJECTION, METRONIDAZOLE: HCPCS | Performed by: NURSE PRACTITIONER

## 2020-10-29 PROCEDURE — 99900035 HC TECH TIME PER 15 MIN (STAT)

## 2020-10-29 PROCEDURE — 37000009 HC ANESTHESIA EA ADD 15 MINS: Performed by: INTERNAL MEDICINE

## 2020-10-29 PROCEDURE — 27000671 HC TUBING MICROBORE EXT: Performed by: NURSE ANESTHETIST, CERTIFIED REGISTERED

## 2020-10-29 PROCEDURE — 37000008 HC ANESTHESIA 1ST 15 MINUTES: Performed by: INTERNAL MEDICINE

## 2020-10-29 PROCEDURE — 27201038 HC PROBE, BI-POLAR: Performed by: INTERNAL MEDICINE

## 2020-10-29 PROCEDURE — 45388 COLONOSCOPY W/ABLATION: CPT | Performed by: INTERNAL MEDICINE

## 2020-10-29 RX ORDER — DIPHENHYDRAMINE HYDROCHLORIDE 50 MG/ML
12.5 INJECTION INTRAMUSCULAR; INTRAVENOUS
Status: DISCONTINUED | OUTPATIENT
Start: 2020-10-29 | End: 2020-10-29 | Stop reason: HOSPADM

## 2020-10-29 RX ORDER — FENTANYL CITRATE 50 UG/ML
25 INJECTION, SOLUTION INTRAMUSCULAR; INTRAVENOUS
Status: DISCONTINUED | OUTPATIENT
Start: 2020-10-29 | End: 2020-10-29 | Stop reason: HOSPADM

## 2020-10-29 RX ORDER — SODIUM CHLORIDE 0.9 % (FLUSH) 0.9 %
10 SYRINGE (ML) INJECTION
Status: DISCONTINUED | OUTPATIENT
Start: 2020-10-29 | End: 2020-10-29 | Stop reason: HOSPADM

## 2020-10-29 RX ORDER — SODIUM CHLORIDE 9 MG/ML
INJECTION, SOLUTION INTRAVENOUS CONTINUOUS PRN
Status: DISCONTINUED | OUTPATIENT
Start: 2020-10-29 | End: 2020-10-29

## 2020-10-29 RX ORDER — SODIUM POLYSTYRENE SULFONATE 15 G/60ML
15 SUSPENSION ORAL; RECTAL ONCE
Status: DISCONTINUED | OUTPATIENT
Start: 2020-10-29 | End: 2020-10-29

## 2020-10-29 RX ORDER — ONDANSETRON 2 MG/ML
4 INJECTION INTRAMUSCULAR; INTRAVENOUS DAILY PRN
Status: DISCONTINUED | OUTPATIENT
Start: 2020-10-29 | End: 2020-10-29 | Stop reason: HOSPADM

## 2020-10-29 RX ORDER — MEPERIDINE HYDROCHLORIDE 50 MG/ML
12.5 INJECTION INTRAMUSCULAR; INTRAVENOUS; SUBCUTANEOUS EVERY 10 MIN PRN
Status: DISCONTINUED | OUTPATIENT
Start: 2020-10-29 | End: 2020-10-29 | Stop reason: HOSPADM

## 2020-10-29 RX ORDER — PANTOPRAZOLE SODIUM 40 MG/1
40 TABLET, DELAYED RELEASE ORAL
Status: DISCONTINUED | OUTPATIENT
Start: 2020-10-29 | End: 2020-10-29 | Stop reason: HOSPADM

## 2020-10-29 RX ORDER — OXYCODONE HYDROCHLORIDE 5 MG/1
5 TABLET ORAL
Status: DISCONTINUED | OUTPATIENT
Start: 2020-10-29 | End: 2020-10-29 | Stop reason: HOSPADM

## 2020-10-29 RX ORDER — PROPOFOL 10 MG/ML
VIAL (ML) INTRAVENOUS
Status: DISCONTINUED | OUTPATIENT
Start: 2020-10-29 | End: 2020-10-29

## 2020-10-29 RX ADMIN — AMLODIPINE BESYLATE 5 MG: 5 TABLET ORAL at 08:10

## 2020-10-29 RX ADMIN — ALLOPURINOL 100 MG: 100 TABLET ORAL at 08:10

## 2020-10-29 RX ADMIN — PANTOPRAZOLE SODIUM 40 MG: 40 TABLET, DELAYED RELEASE ORAL at 10:10

## 2020-10-29 RX ADMIN — METRONIDAZOLE 500 MG: 500 INJECTION, SOLUTION INTRAVENOUS at 02:10

## 2020-10-29 RX ADMIN — PROPOFOL 20 MG: 10 INJECTION, EMULSION INTRAVENOUS at 02:10

## 2020-10-29 RX ADMIN — DEXTROSE 8 MG/HR: 50 INJECTION, SOLUTION INTRAVENOUS at 02:10

## 2020-10-29 RX ADMIN — PROPOFOL 50 MG: 10 INJECTION, EMULSION INTRAVENOUS at 02:10

## 2020-10-29 RX ADMIN — SODIUM CHLORIDE: 0.9 INJECTION, SOLUTION INTRAVENOUS at 01:10

## 2020-10-29 RX ADMIN — PROPOFOL 20 MG: 10 INJECTION, EMULSION INTRAVENOUS at 01:10

## 2020-10-29 RX ADMIN — PROPOFOL 30 MG: 10 INJECTION, EMULSION INTRAVENOUS at 01:10

## 2020-10-29 RX ADMIN — SERTRALINE HYDROCHLORIDE 25 MG: 25 TABLET ORAL at 08:10

## 2020-10-29 RX ADMIN — FERROUS SULFATE TAB 325 MG (65 MG ELEMENTAL FE) 325 MG: 325 (65 FE) TAB at 08:10

## 2020-10-29 RX ADMIN — MAGNESIUM OXIDE 400 MG: 400 TABLET ORAL at 08:10

## 2020-10-29 RX ADMIN — THERA TABS 1 TABLET: TAB at 08:10

## 2020-10-29 RX ADMIN — PROPOFOL 30 MG: 10 INJECTION, EMULSION INTRAVENOUS at 02:10

## 2020-10-29 RX ADMIN — PROPOFOL 50 MG: 10 INJECTION, EMULSION INTRAVENOUS at 01:10

## 2020-10-29 NOTE — DISCHARGE SUMMARY
[facility]  Discharge Summary  Patient Name: Blaire Cage MRN: [id number]   Patient Class: IP- Inpatient  Length of Stay: 1   Admission Date: 10/25/2020  7:38 PM Attending Physician: Jenaro Graff MD   Primary Care Provider: Eli Edmonds MD Face-to-Face encounter date: 10/29/2020   Chief Complaint: Nausea (last meal at 1200. ), Vomiting, Diarrhea, and Weakness (Pt went to restroom after beginning of incontinence and lowered herself to the floor. Family found her and cleaned her up prior to calling EMS)    Date of Discharge: 10/29/2020  Discharge Disposition: Home  Condition: Stable    Reason for Hospitalization   Primary Diagnosis: UGI bleeding due to GAVE    Secondary Diagnosis:  CKD stage IV  Chronic anemia      Patient Active Problem List   Diagnosis    Diabetic neuropathy, type II diabetes mellitus    CKD (chronic kidney disease), stage III, eGFR 39, progressive 31    Hypothyroidism    Hypertension associated with diabetes    Hyperlipidemia associated with type 2 diabetes mellitus    Type 2 diabetes mellitus with stage 4 chronic kidney disease    Right carotid bruit    COPD mixed type    Anxiety    Abdominal aortic aneurysm without rupture    Hip arthritis    Degenerative spinal arthritis    Osteoporosis    Left ventricular diastolic dysfunction with preserved systolic function    Hypertensive left ventricular hypertrophy, without heart failure    Smoker, quit 5/2016, 25 pck-years    Abdominal obesity    Absolute anemia    Syncopal episodes, last 4/2020    Body mass index (BMI) 23.0-23.9, adult, today 24.1    Iron deficiency anemia due to chronic blood loss    Proteinuria due to type 2 diabetes mellitus    History of syncope in childhood    Prerenal azotemia    Drug-induced constipation    Asthmatic bronchitis with acute exacerbation    Controlled type 2 diabetes mellitus, without long-term current use of insulin    Acute pulmonary embolism    Asthma-COPD  overlap syndrome    Eosinophilic asthma    Moderate malnutrition    Type 2 diabetes mellitus with kidney complication, without long-term current use of insulin    Chronic anticoagulation    Constipation    DVT (deep venous thrombosis)    History of pulmonary embolism    Anemia due to multiple mechanisms    Chronic anemia    Normocytic normochromic anemia    Anemia, chronic renal failure, stage 3 (moderate)    Homocysteinemia    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    Hyperkalemia    Diastolic CHF, chronic    Anemia of chronic disease    Kidney stones    Macrocytic anemia    Orthostatic hypotension    Closed left hip fracture    Hip pain, left    Chronic respiratory failure with hypoxia    Nonrheumatic aortic valve stenosis    Mitral stenosis    Hypoalbuminemia    Hypoglycemia    CKD (chronic kidney disease), stage IV    Vertigo    Left foot pain    Posterior tibial tendon dysfunction, left    Arthritis    Pulmonary hypertension    Diastolic dysfunction    Status post placement of implantable loop recorder    Acute colitis    GI bleed    Gastrointestinal hemorrhage       Brief History of Present Illness    Blaire Cage is a 89 y.o.  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 The patient presented to [facility] on [facility]/2020 with a primary complaint of Nausea (last meal at 1200. ), Vomiting, Diarrhea, and Weakness (Pt went to restroom after beginning of incontinence and lowered herself to the floor. Family found her and cleaned her up prior to calling EMS)      For the full HPI please refer to the History & Physical from this admission.    Hospital Course By Problem with Pertinent Findings     Admitted for Upper GI bleeding. Gastroenterology consulted for endoscopy. Patient had endoscopy first time however due to patient being on anticoagulation it was repeated again and patient had EPC of angioectasias in stomach and duodenum. Patient then had colonoscopy and small bowel enterscopy with ablation of angiodysplatic lesion of transverse colon. Patient aspirin was stopped and anticoagulation was resumed. Patient discharged home in stable condition. Patient is going to have Capsule endoscopy arranged as outpatient by Dr. Kee.       Physical Exam  BP (!) 118/51 (BP Location: Left arm, Patient Position: Lying)   Pulse 63   Temp 96.8 °F (36 °C) (Axillary)   Resp 16   Ht 5' 6" (1.676 m)   Wt 59.4 kg (131 lb)   LMP  (LMP Unknown)   SpO2 100%   Breastfeeding No   BMI 21.14 kg/m²   Vitals reviewed.    Constitutional: No distress.   HENT: Atraumatic.   Cardiovascular: Normal rate, regular rhythm and normal heart sounds.   Pulmonary/Chest: Effort normal. Clear to auscultation bilaterally. No wheezes.   Abdominal: Soft. Bowel sounds are normal. Exhibits no distension and no mass. No tenderness  Neurological: Alert.   Skin: Skin is warm and dry.     Following labs were Reviewed   Recent Labs   Lab 10/29/20  0431   WBC 8.32   HGB 8.3*   HCT 25.5*      CALCIUM 8.7      K 4.0   CO2 20*   *   BUN 36*   CREATININE 1.4     No results found for: POCTGLUCOSE     All labs " within the past 24 hours have been reviewed    Microbiology Results (last 7 days)     Procedure Component Value Units Date/Time    Blood culture #2 **CANNOT BE ORDERED STAT** [[id number]] Collected: 10/25/20 2126    Order Status: Completed Specimen: Blood from Peripheral, Antecubital, Right Updated: 10/28/20 2232     Blood Culture, Routine No Growth to date      No Growth to date      No Growth to date      No Growth to date    Blood culture #1 **CANNOT BE ORDERED STAT** [[id number]] Collected: 10/25/20 2015    Order Status: Completed Specimen: Blood from Peripheral, Antecubital, Right Updated: 10/28/20 2232     Blood Culture, Routine No Growth to date      No Growth to date      No Growth to date      No Growth to date    Clostridium difficile EIA [[id number]]     Order Status: Canceled Specimen: Stool         CT Renal Stone Study ABD Pelvis WO   Final Result      CT Cervical Spine Without Contrast   Final Result      CT Head Without Contrast   Final Result      X-Ray Chest AP Portable   Final Result          No results found. However, due to the size of the patient record, not all encounters were searched. Please check Results Review for a complete set of results.      Consultants and Procedures   Consultants:  Gastoenterology    Procedures:   EDG  colonscopy  Small bowl push enteroscopy    Discharge Information:   Diet:  Resume regular diet    Physical Activity:  Activity as tolerated    Instructions:  1. Take all medications as prescribed  2. Keep all follow-up appointments  3. Return to the hospital or call your primary care physicians if any worsening symptoms such as chest pain, shortness of breath occur.      Follow-Up Appointments:  1. Please call your primary care physician to schedule an appointment in 1 week time.      Pending laboratory work/Tests to be performed/followed by the Primary care Physician: None    The patient was discharged in the care of her parents//wife/family/caregiver, with  discharge instructions were reviewed in written and verbal form. All pertinent questions were discussed and prescriptions were provided. The importance of making follow up appointments and compliance of medications has been stressed repeatedly. The patient will follow up in 1 week or sooner as needed with the PCP, and the patient is on board with the plan. Upon discharge, patient needs to be on following medications.    Discharge Medications:     Medication List      CHANGE how you take these medications    acetaminophen 325 MG tablet  Commonly known as: TYLENOL  Take 2 tablets (650 mg total) by mouth every 4 (four) hours as needed.  What changed:   · how much to take  · reasons to take this     furosemide 20 MG tablet  Commonly known as: LASIX  Take 1 tablet only as needed, for swelling, increase SOB, weight gain of 3 lbs overnight or 5 lbs for the week  What changed: Another medication with the same name was removed. Continue taking this medication, and follow the directions you see here.     nitroGLYCERIN 0.4 MG SL tablet  Commonly known as: NITROSTAT  Place 1 tablet (0.4 mg total) under the tongue every 5 (five) minutes as needed for Chest pain. As needed  What changed: additional instructions     simvastatin 40 MG tablet  Commonly known as: ZOCOR  TAKE 1 TABLET BY MOUTH ONCE DAILY IN THE EVENING  What changed:   · how much to take  · how to take this  · when to take this        CONTINUE taking these medications    albuterol 2.5 mg /3 mL (0.083 %) nebulizer solution  Commonly known as: PROVENTIL     albuterol-ipratropium 2.5 mg-0.5 mg/3 mL nebulizer solution  Commonly known as: DUO-NEB  Take 3 mLs by nebulization every 6 (six) hours as needed for Wheezing. Rescue     allopurinoL 100 MG tablet  Commonly known as: ZYLOPRIM     amLODIPine 5 MG tablet  Commonly known as: NORVASC  Take 1 tablet (5 mg total) by mouth 2 (two) times daily.     apixaban 5 mg Tab  Commonly known as: ELIQUIS  Take 1 tablet (5 mg total) by  mouth 2 (two) times daily.     calcium carbonate 500 mg calcium (1,250 mg) tablet  Commonly known as: OS-TASIA     diclofenac sodium 1 % Gel  Commonly known as: VOLTAREN  Apply 2 g topically once daily.     docusate sodium 100 MG capsule  Commonly known as: COLACE  Take 1 capsule (100 mg total) by mouth 2 (two) times daily.     ferrous sulfate 325 mg (65 mg iron) Tab tablet  Commonly known as: FEOSOL  Take 1 tablet (325 mg total) by mouth 2 (two) times daily with meals.     fish oil-omega-3 fatty acids 300-1,000 mg capsule     fluocinolone 0.01 % external solution  Commonly known as: SYNALAR  Apply topically 2 (two) times daily.     fluticasone propionate 50 mcg/actuation nasal spray  Commonly known as: FLONASE  2 sprays by Each Nare route once daily.     fluticasone-umeclidin-vilanter 100-62.5-25 mcg Dsdv  Commonly known as: TRELEGY ELLIPTA  Inhale 1 puff into the lungs once daily.     folic acid-vit B6-vit B12 2.5-25-2 mg 2.5-25-2 mg Tab  Commonly known as: FOLBIC  Take 1 tablet by mouth once daily.     gabapentin 300 MG capsule  Commonly known as: NEURONTIN  Take 1 capsule (300 mg total) by mouth every evening.     levothyroxine 88 MCG tablet  Commonly known as: SYNTHROID     lidocaine 5 %  Commonly known as: LIDODERM  Place 1 patch onto the skin once daily. Remove & Discard patch within 12 hours or as directed by MD     linaCLOtide 72 mcg Cap capsule  Commonly known as: LINZESS  Take 1 capsule (72 mcg total) by mouth once daily.     magnesium oxide 400 mg (241.3 mg magnesium) tablet  Commonly known as: MAG-OX  TAKE 1 TABLET BY MOUTH ONCE DAILY     meclizine 25 mg tablet  Commonly known as: ANTIVERT  Take 1 tablet (25 mg total) by mouth 3 (three) times daily as needed.     montelukast 10 mg tablet  Commonly known as: SINGULAIR  TAKE 1 TABLET BY MOUTH ONCE DAILY IN THE EVENING     multivitamin tablet  Commonly known as: THERAGRAN  Take 1 tablet by mouth once daily.     mupirocin 2 % ointment  Commonly known as:  BACTROBAN  Apply topically 2 (two) times daily.     omeprazole 40 MG capsule  Commonly known as: PRILOSEC  Take 1 capsule (40 mg total) by mouth once daily.     sertraline 25 MG tablet  Commonly known as: ZOLOFT  Take 1 tablet (25 mg total) by mouth once daily.     VITAMIN C 500 MG tablet  Generic drug: ascorbic acid (vitamin C)     vitamin D 1000 units Tab  Commonly known as: VITAMIN D3  Take 1 tablet (1,000 Units total) by mouth once daily.        STOP taking these medications    aspirin 81 MG EC tablet  Commonly known as: ECOTRIN              I spent 45 minutes preparing the discharge including reviewing records from previous encounters, preparation of discharge summary, assessing and final examination of the patient, discharge medicine reconciliation, discussing plan of care, follow up and education and prescriptions.       Jenaro Graff  [facility] Hospitalist  10/29/2020

## 2020-10-29 NOTE — PT/OT/SLP PROGRESS
Occupational Therapy      Patient Name:  Blaire Cage   MRN:  [id number]    Patient not seen today secondary to Other (Comment), Unavailable (Comment)(Pt unable to get off commode after taking colonoscopy prep in AM, off unit for colonoscopy in PM.). Will follow-up next service date.    Emani Mayer OT  10/29/2020

## 2020-10-29 NOTE — PLAN OF CARE
Problem: Fall Injury Risk  Goal: Absence of Fall and Fall-Related Injury  Outcome: Met     Problem: Adult Inpatient Plan of Care  Goal: Plan of Care Review  Outcome: Met  Goal: Patient-Specific Goal (Individualization)  Outcome: Met  Goal: Absence of Hospital-Acquired Illness or Injury  Outcome: Met  Goal: Optimal Comfort and Wellbeing  Outcome: Met  Goal: Readiness for Transition of Care  Outcome: Met  Goal: Rounds/Family Conference  Outcome: Met     Problem: Infection  Goal: Infection Symptom Resolution  Outcome: Met     Problem: Diabetes Comorbidity  Goal: Blood Glucose Level Within Desired Range  Outcome: Met     Problem: Skin Injury Risk Increased  Goal: Skin Health and Integrity  Outcome: Met

## 2020-10-29 NOTE — TRANSFER OF CARE
"Anesthesia Transfer of Care Note    Patient: Blaire Choudharytte    Procedure(s) Performed: Procedure(s) (LRB):  COLONOSCOPY (Left)  ENTEROSCOPY (N/A)    Patient location: GI    Anesthesia Type: MAC    Transport from OR: Transported from OR on room air with adequate spontaneous ventilation    Post pain: adequate analgesia    Post assessment: no apparent anesthetic complications    Post vital signs: stable    Level of consciousness: awake and alert    Nausea/Vomiting: no nausea/vomiting    Complications: none    Transfer of care protocol was followed      Last vitals:   Visit Vitals  BP (!) 118/51 (BP Location: Left arm, Patient Position: Lying)   Pulse 63   Temp 36 °C (96.8 °F) (Axillary)   Resp 16   Ht 5' 6" (1.676 m)   Wt 59.4 kg (131 lb)   LMP  (LMP Unknown)   SpO2 100%   Breastfeeding No   BMI 21.14 kg/m²     "

## 2020-10-29 NOTE — PROVATION PATIENT INSTRUCTIONS
Discharge Summary/Instructions after an Endoscopic Procedure  Patient Name: Blaire Cage  Patient MRN: [id number]  Patient YOB: 1930 Thursday, October 29, 2020  Singh Kee III, MD  RESTRICTIONS:  During your procedure today, you received medications for sedation.  These   medications may affect your judgment, balance and coordination.  Therefore,   for 24 hours, you have the following restrictions:   - DO NOT drive a car, operate machinery, make legal/financial decisions,   sign important papers or drink alcohol.    ACTIVITY:  Today: no heavy lifting, straining or running due to procedural   sedation/anesthesia.  The following day: return to full activity including work.  DIET:  Eat and drink normally unless instructed otherwise.     TREATMENT FOR COMMON SIDE EFFECTS:  - Mild abdominal pain, nausea, belching, bloating or excessive gas:  rest,   eat lightly and use a heating pad.  - Sore Throat: treat with throat lozenges and/or gargle with warm salt   water.  - Because air was used during the procedure, expelling large amounts of air   from your rectum or belching is normal.  - If a bowel prep was taken, you may not have a bowel movement for 1-3 days.    This is normal.  SYMPTOMS TO WATCH FOR AND REPORT TO YOUR PHYSICIAN:  1. Abdominal pain or bloating, other than gas cramps.  2. Chest pain.  3. Back pain.  4. Signs of infection such as: chills or fever occurring within 24 hours   after the procedure.  5. Rectal bleeding, which would show as bright red, maroon, or black stools.   (A tablespoon of blood from the rectum is not serious, especially if   hemorrhoids are present.)  6. Vomiting.  7. Weakness or dizziness.  GO DIRECTLY TO THE NEAREST EMERGENCY ROOM IF YOU HAVE ANY OF THE FOLLOWING:      Difficulty breathing              Chills and/or fever over 101 F   Persistent vomiting and/or vomiting blood   Severe abdominal pain   Severe chest pain   Black, tarry stools   Bleeding- more than one  tablespoon   Any other symptom or condition that you feel may need urgent attention  Your doctor recommends these additional instructions:  If any biopsies were taken, your doctors clinic will contact you in 1 to 2   weeks with any results.  - Patient has a contact number available for emergencies.  The signs and   symptoms of potential delayed complications were discussed with the   patient.  Return to normal activities tomorrow.  Written discharge   instructions were provided to the patient.   - Resume previous diet.   - Discharge patient to home (ambulatory).   - Continue present medications.   - Return to GI clinic PRN.   - Set up for VCE as outpatient in 1-2 weeks.  For questions, problems or results please call your physician - Singh Kee III, MD at Work:  [phone].  [facility], EMERGENCY ROOM PHONE NUMBER: [phone]  IF A COMPLICATION OR EMERGENCY SITUATION ARISES AND YOU ARE UNABLE TO REACH   YOUR PHYSICIAN - GO DIRECTLY TO THE EMERGENCY ROOM.  Singh Kee III, MD  10/29/2020 2:42:26 PM  This report has been verified and signed electronically.  PROVATION

## 2020-10-29 NOTE — PROGRESS NOTES
"[facility]  Adult Nutrition   Progress Note (Initial Assessment)     SUMMARY     Recommendations  Recommendation/Intervention: 1. Recommend advancing diet as medically appropriate post procedure 2. RD to follow and monitor diet progression, tolerance, etc.  Goals: 1. Diet progressed >CLD w/in 24-48 hrs.  Nutrition Goal Status: new    Dietitian Rounds Brief    Pt seen for LOS. NPO at this time for repeat EGD w/ ablation this AM. Previously on Clear Liquids. LBM 10/25. RD to follow diet progression/tolerance.     Reason for Assessment  Reason For Assessment: length of stay  Diagnosis: gastrointestinal disease(acute colitis)  Relevant Medical History: DVT, CHF, DM, HTN, PE, hyperlipidemia  Interdisciplinary Rounds: attended    Nutrition Risk Screen  Nutrition Risk Screen: no indicators present     MST Score: 0  Have you recently lost weight without trying?: No  Weight loss score: 0  Have you been eating poorly because of a decreased appetite?: No  Appetite score: 0       Nutrition/Diet History  Food Allergies: NKFA  Factors Affecting Nutritional Intake: NPO    Anthropometrics  Temp: 98.3 °F (36.8 °C)  Height: 5' 6" (167.6 cm)  Height (inches): 66 in  Weight Method: Stated  Weight: 59.4 kg (131 lb)  Weight (lb): 131 lb  Ideal Body Weight (IBW), Female: 130 lb  % Ideal Body Weight, Female (lb): 100.77 %  BMI (Calculated): 21.2  BMI Grade: 18.5-24.9 - normal       Weight History:  Wt Readings from Last 10 Encounters:   10/29/20 59.4 kg (131 lb)   10/22/20 59.4 kg (131 lb)   09/18/20 57.2 kg (126 lb)   09/09/20 60.3 kg (132 lb 15 oz)   08/28/20 58.8 kg (129 lb 10.1 oz)   08/18/20 59.3 kg (130 lb 11.7 oz)   08/05/20 60.4 kg (133 lb 2.5 oz)   07/28/20 61.2 kg (135 lb)   07/15/20 61 kg (134 lb 7.7 oz)   06/29/20 58.9 kg (129 lb 13.6 oz)       Lab/Procedures/Meds: Pertinent Labs Reviewed    Clinical Chemistry:  Recent Labs   Lab 10/25/20  2015  10/29/20  0431      < > 142   K 4.1   < > 4.0      < > " 112*   CO2 24   < > 20*   *   < > 99   BUN 82*   < > 36*   CREATININE 2.6*   < > 1.4   CALCIUM 8.9   < > 8.7   PROT 6.5  --   --    ALBUMIN 3.9  --   --    BILITOT 0.6  --   --    ALKPHOS 65  --   --    AST 26  --   --    ALT 20  --   --    ANIONGAP 11   < > 10   ESTGFRAFRICA 18.1*   < > 38.2*   EGFRNONAA 15.7*   < > 33.1*   MG  --    < > 2.3   LIPASE 42  --   --     < > = values in this interval not displayed.       CBC:   Recent Labs   Lab 10/29/20  0431   WBC 8.32   RBC 2.59*   HGB 8.3*   HCT 25.5*      MCV 99*   MCH 32.0*   MCHC 32.5       Cardiac Profile:  Recent Labs   Lab 10/25/20  2015   *   TROPONINI <0.030         Medications: Pertinent Medications reviewed    Scheduled Meds:   allopurinoL  100 mg Oral Daily    amLODIPine  5 mg Oral BID    ferrous sulfate  325 mg Oral BID WM    fluticasone propionate  2 spray Each Nostril Daily    gabapentin  300 mg Oral QHS    levothyroxine  88 mcg Oral Before breakfast    magnesium oxide  400 mg Oral Daily    montelukast  10 mg Oral QHS    multivitamin  1 tablet Oral Daily    pantoprazole  40 mg Oral Before breakfast    sertraline  25 mg Oral Daily    simvastatin  40 mg Oral QHS    sodium polystyrene  15 g Rectal Once       Continuous Infusions:    PRN Meds:.acetaminophen, albuterol, melatonin, ondansetron, polyethylene glycol, sodium chloride 0.9%    Estimated/Assessed Needs  Weight Used For Calorie Calculations: 59.4 kg (131 lb)  Energy Calorie Requirements (kcal): 3363-5648 (25-30 kcal/kg)  Energy Need Method: Kcal/kg  Protein Requirements: 59-71 g (1-1.2 g/kg)  Weight Used For Protein Calculations: 59.4 kg (130 lb 15.3 oz)     Estimated Fluid Requirement Method: RDA Method  RDA Method (mL): 1485       Nutrition Prescription Ordered  Current Diet Order: NPO    Evaluation of Received Nutrient/Fluid Intake  Energy Calories Required: not meeting needs  Protein Required: not meeting needs  Tolerance: other (see comments)(NPO)   No intake  or output data in the 24 hours ending 10/29/20 1027     % Meal Intake: NPO    Nutrition Risk  Level of Risk/Frequency of Follow-up: high     Monitor and Evaluation  Food and Nutrient Intake: food and beverage intake, energy intake  Food and Nutrient Adminstration: diet order  Physical Activity and Function: nutrition-related ADLs and IADLs  Anthropometric Measurements: weight, weight change, body mass index  Biochemical Data, Medical Tests and Procedures: electrolyte and renal panel, gastrointestinal profile, glucose/endocrine profile  Nutrition-Focused Physical Findings: overall appearance     Nutrition Follow-Up  RD Follow-up?: Yes    Mary Echevarria RD 10/29/2020 10:27 AM

## 2020-10-29 NOTE — PLAN OF CARE
10/29/20 0810   Patient Assessment/Suction   Level of Consciousness (AVPU) alert   Respiratory Effort Unlabored   PRE-TX-O2   O2 Device (Oxygen Therapy) room air   SpO2 (!) 94 %   Pulse Oximetry Type Intermittent   $ Pulse Oximetry - Multiple Charge Pulse Oximetry - Multiple   Pulse 68   Resp 18   Aerosol Therapy   $ Aerosol Therapy Charges PRN treatment not required   Respiratory Evaluation   $ Care Plan Tech Time 15 min

## 2020-10-29 NOTE — PROVATION PATIENT INSTRUCTIONS
Discharge Summary/Instructions after an Endoscopic Procedure  Patient Name: Blaire Cage  Patient MRN: [id number]  Patient YOB: 1930 Thursday, October 29, 2020  Singh Kee III, MD  RESTRICTIONS:  During your procedure today, you received medications for sedation.  These   medications may affect your judgment, balance and coordination.  Therefore,   for 24 hours, you have the following restrictions:   - DO NOT drive a car, operate machinery, make legal/financial decisions,   sign important papers or drink alcohol.    ACTIVITY:  Today: no heavy lifting, straining or running due to procedural   sedation/anesthesia.  The following day: return to full activity including work.  DIET:  Eat and drink normally unless instructed otherwise.     TREATMENT FOR COMMON SIDE EFFECTS:  - Mild abdominal pain, nausea, belching, bloating or excessive gas:  rest,   eat lightly and use a heating pad.  - Sore Throat: treat with throat lozenges and/or gargle with warm salt   water.  - Because air was used during the procedure, expelling large amounts of air   from your rectum or belching is normal.  - If a bowel prep was taken, you may not have a bowel movement for 1-3 days.    This is normal.  SYMPTOMS TO WATCH FOR AND REPORT TO YOUR PHYSICIAN:  1. Abdominal pain or bloating, other than gas cramps.  2. Chest pain.  3. Back pain.  4. Signs of infection such as: chills or fever occurring within 24 hours   after the procedure.  5. Rectal bleeding, which would show as bright red, maroon, or black stools.   (A tablespoon of blood from the rectum is not serious, especially if   hemorrhoids are present.)  6. Vomiting.  7. Weakness or dizziness.  GO DIRECTLY TO THE NEAREST EMERGENCY ROOM IF YOU HAVE ANY OF THE FOLLOWING:      Difficulty breathing              Chills and/or fever over 101 F   Persistent vomiting and/or vomiting blood   Severe abdominal pain   Severe chest pain   Black, tarry stools   Bleeding- more than one  tablespoon   Any other symptom or condition that you feel may need urgent attention  Your doctor recommends these additional instructions:  If any biopsies were taken, your doctors clinic will contact you in 1 to 2   weeks with any results.  - proceed w/ colonosocopy.   - Return patient to hospital [facility] for ongoing care.  For questions, problems or results please call your physician - Singh Kee III, MD at Work:  [phone].  [facility], EMERGENCY ROOM PHONE NUMBER: [phone]  IF A COMPLICATION OR EMERGENCY SITUATION ARISES AND YOU ARE UNABLE TO REACH   YOUR PHYSICIAN - GO DIRECTLY TO THE EMERGENCY ROOM.  Singh Kee III, MD  10/29/2020 2:38:02 PM  This report has been verified and signed electronically.  PROVATION

## 2020-10-29 NOTE — ANESTHESIA PREPROCEDURE EVALUATION
10/29/2020  Blaire Cage is a 89 y.o., female.    Patient Active Problem List   Diagnosis    Diabetic neuropathy, type II diabetes mellitus    CKD (chronic kidney disease), stage III, eGFR 39, progressive 31    Hypothyroidism    Hypertension associated with diabetes    Hyperlipidemia associated with type 2 diabetes mellitus    Type 2 diabetes mellitus with stage 4 chronic kidney disease    Right carotid bruit    COPD mixed type    Anxiety    Abdominal aortic aneurysm without rupture    Hip arthritis    Degenerative spinal arthritis    Osteoporosis    Left ventricular diastolic dysfunction with preserved systolic function    Hypertensive left ventricular hypertrophy, without heart failure    Smoker, quit 5/2016, 25 pck-years    Abdominal obesity    Absolute anemia    Syncopal episodes, last 4/2020    Body mass index (BMI) 23.0-23.9, adult, today 24.1    Iron deficiency anemia due to chronic blood loss    Proteinuria due to type 2 diabetes mellitus    History of syncope in childhood    Prerenal azotemia    Drug-induced constipation    Asthmatic bronchitis with acute exacerbation    Controlled type 2 diabetes mellitus, without long-term current use of insulin    Acute pulmonary embolism    Asthma-COPD overlap syndrome    Eosinophilic asthma    Moderate malnutrition    Type 2 diabetes mellitus with kidney complication, without long-term current use of insulin    Chronic anticoagulation    Constipation    DVT (deep venous thrombosis)    History of pulmonary embolism    Anemia due to multiple mechanisms    Chronic anemia    Normocytic normochromic anemia    Anemia, chronic renal failure, stage 3 (moderate)    Homocysteinemia    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    Hyperkalemia    Diastolic CHF, chronic    Anemia of chronic disease    Kidney stones    Macrocytic  anemia    Orthostatic hypotension    Closed left hip fracture    Hip pain, left    Chronic respiratory failure with hypoxia    Nonrheumatic aortic valve stenosis    Mitral stenosis    Hypoalbuminemia    Hypoglycemia    CKD (chronic kidney disease), stage IV    Vertigo    Left foot pain    Posterior tibial tendon dysfunction, left    Arthritis    Pulmonary hypertension    Diastolic dysfunction    Status post placement of implantable loop recorder    Acute colitis    GI bleed    Gastrointestinal hemorrhage       Past Surgical History:   Procedure Laterality Date    APPENDECTOMY      CHOLECYSTECTOMY      ESOPHAGOGASTRODUODENOSCOPY Left 10/26/2020    Procedure: EGD (ESOPHAGOGASTRODUODENOSCOPY);  Surgeon: Kareem Gramajo MD;  Location: [facility] ENDO;  Service: Endoscopy;  Laterality: Left;    ESOPHAGOGASTRODUODENOSCOPY Left 10/28/2020    Procedure: EGD (ESOPHAGOGASTRODUODENOSCOPY);  Surgeon: Kareem Gramajo MD;  Location: [facility] ENDO;  Service: Endoscopy;  Laterality: Left;    FRACTURE SURGERY      right hip     HERNIA REPAIR      groin    HYSTERECTOMY      INSERTION OF IMPLANTABLE LOOP RECORDER N/A 12/12/2018    Procedure: Insertion, Implantable Loop Recorder;  Surgeon: Ashok Herbert MD;  Location: [facility] CATH LAB;  Service: Cardiology;  Laterality: N/A;    PERCUTANEOUS PINNING OF HIP Left 3/23/2019    Procedure: PINNING, HIP, PERCUTANEOUS;  Surgeon: Jorje Hamlin MD;  Location: [facility] OR;  Service: Orthopedics;  Laterality: Left;    VARICOSE VEIN SURGERY          Tobacco Use:  The patient  reports that she quit smoking about 5 years ago. Her smoking use included cigarettes. She has a 15.40 pack-year smoking history. She has never used smokeless tobacco.     Results for orders placed or performed during the hospital encounter of 10/25/20   EKG 12-lead    Collection Time: 10/25/20  8:05 PM    Narrative    Test Reason : R11.0,    Vent. Rate : 067 BPM     Atrial Rate : 067 BPM     P-R Int : 192  ms          QRS Dur : 088 ms      QT Int : 452 ms       P-R-T Axes : 065 029 060 degrees     QTc Int : 477 ms    Normal sinus rhythm  Anterior infarct ,age undetermined clockwise rotation     Abnormal ECG  When compared with ECG of 18-SEP-2020 18:34,  No significant change was found  Confirmed by Luis Carlos Chaudhari MD ([id number]) on 10/26/2020 9:21:31 AM    Referred By: [email or web address]   SELF           Confirmed By:Luis Carlos Chaudhari MD             Lab Results   Component Value Date    WBC 8.32 10/29/2020    HGB 8.3 (L) 10/29/2020    HCT 25.5 (L) 10/29/2020    MCV 99 (H) 10/29/2020     10/29/2020     BMP  Lab Results   Component Value Date     10/29/2020    K 4.0 10/29/2020     (H) 10/29/2020    CO2 20 (L) 10/29/2020    BUN 36 (H) 10/29/2020    CREATININE 1.4 10/29/2020    CALCIUM 8.7 10/29/2020    ANIONGAP 10 10/29/2020    GLU 99 10/29/2020     10/28/2020     10/27/2020       Results for orders placed during the hospital encounter of 05/07/20   Echo Color Flow Doppler? Yes; Bubble Contrast? Yes    Narrative · Concentric left ventricular hypertrophy.  · The mean diastolic gradient across the mitral valve is 5 mmHg at a heart   rate of 57 bpm.  · Normal left ventricular systolic function. The estimated ejection   fraction is 65%.  · Grade II (moderate) left ventricular diastolic dysfunction consistent   with pseudonormalization.  · Normal right ventricular systolic function.  · Severe left atrial enlargement.  · Mild right atrial enlargement.  · Mild mitral regurgitation.  · Mild to moderate tricuspid regurgitation.  · Normal central venous pressure (3 mmHg).  · The estimated PA systolic pressure is 50 mmHg.  · Pulmonary hypertension present.  · No PFO on color flow or saline bubble study              Pre-op Assessment    I have reviewed the Patient Summary Reports.     I have reviewed the Nursing Notes. I have reviewed the NPO Status.   I have reviewed the Medications.     Review of  Systems  Anesthesia Hx:  No problems with previous Anesthesia  Denies Family Hx of Anesthesia complications.   Denies Personal Hx of Anesthesia complications.   Social:  Non-Smoker    Hematology/Oncology:         -- Anemia: -- Coag Disorders: Bleeding Disorder (history of PE on Eliquis (stopped for EGD procedure)):    EENT/Dental:EENT/Dental Normal   Cardiovascular:   Hypertension CHF ECG has been reviewed.  Abdominal Aortic Aneurysm, infrarenal    Pulmonary:   Pneumonia COPD Asthma  Pulmonary Hypertension    Renal/:   Chronic Renal Disease, CRI renal calculi    Hepatic/GI:  Hepatic/GI Normal  Hepatic/GI Symptoms: melena.    Musculoskeletal:   Arthritis (spine)     Neurological:  Neurology Normal    Endocrine:   Diabetes, type 2 Hypothyroidism    Psych:  Psychiatric Normal           Physical Exam  General:  Well nourished    Airway/Jaw/Neck:  Airway Findings: Mouth Opening: Normal Tongue: Normal  General Airway Assessment: Adult  Mallampati: II  TM Distance: Normal, at least 6 cm  Jaw/Neck Findings:  Neck ROM: Normal ROM     Eyes/Ears/Nose:  Eyes/Ears/Nose Findings:    Dental:  Dental Findings: In tact   Chest/Lungs:  Chest/Lungs Findings: Clear to auscultation, Normal Respiratory Rate     Heart/Vascular:  Heart Findings: Rate: Normal  Rhythm: Regular Rhythm  Sounds: Normal     Abdomen:  Abdomen Findings:     Musculoskeletal:  Musculoskeletal Findings:    Skin:  Skin Findings:     Mental Status:  Mental Status Findings:  Cooperative, Alert and Oriented         Anesthesia Plan  Type of Anesthesia, risks & benefits discussed:  Anesthesia Type:  MAC  Patient's Preference: MAC  Intra-op Monitoring Plan: standard ASA monitors  Intra-op Monitoring Plan Comments:   Post Op Pain Control Plan: IV/PO Opioids PRN  Post Op Pain Control Plan Comments:   Induction:   IV  Beta Blocker:         Informed Consent: Patient understands risks and agrees with Anesthesia plan.  Questions answered. Anesthesia consent signed with  patient.  ASA Score: 3     Day of Surgery Review of History & Physical:    H&P update referred to the provider.         Ready For Surgery From Anesthesia Perspective.

## 2020-10-29 NOTE — NURSING
Patient drank approximately half of bowel prep and refused to drink anymore despite education. Patient had 1 large watery brown stool during the night. NPO since midnight other than bowel prep. Patient resting in bed. No distress noted. Call light in reach. Will continue to monitor.

## 2020-10-29 NOTE — PLAN OF CARE
No CM needs at this time.       10/29/20 1553   Final Note   Assessment Type Final Discharge Note   Anticipated Discharge Disposition Home

## 2020-10-29 NOTE — PT/OT/SLP PROGRESS
Physical Therapy      Patient Name:  Blaire Cage   MRN:  [id number]    Patient not seen today secondary to Unavailable (Comment)(Pt taking prep for colonoscopy and on commode). Will follow-up 10/29/2020.    Bertha Brewer, PT

## 2020-10-29 NOTE — PLAN OF CARE
10/28/20 2000   Patient Assessment/Suction   Level of Consciousness (AVPU) alert   Respiratory Effort Unlabored   Expansion/Accessory Muscles/Retractions no use of accessory muscles   All Lung Fields Breath Sounds clear   Rhythm/Pattern, Respiratory unlabored   Cough Frequency no cough   PRE-TX-O2   O2 Device (Oxygen Therapy) room air   SpO2 (!) 93 %   Pulse 63   Resp 18   Aerosol Therapy   $ Aerosol Therapy Charges PRN treatment not required   Respiratory Treatment Status (SVN) PRN treatment not required   Respiratory Evaluation   $ Care Plan Tech Time 15 min   Evaluation For   (CARE PLAN)

## 2020-10-29 NOTE — DISCHARGE INSTRUCTIONS
Diet:  Resume regular diet     Physical Activity:  Activity as tolerated     Instructions:  1. Take all medications as prescribed  2. Keep all follow-up appointments  3. Return to the hospital or call your primary care physicians if any worsening symptoms such as chest pain, shortness of breath occur.        Follow-Up Appointments:  1. Please call your primary care physician to schedule an appointment in 1 week time.

## 2020-10-30 LAB
BACTERIA BLD CULT: NORMAL
BACTERIA BLD CULT: NORMAL

## 2020-11-09 ENCOUNTER — OFFICE VISIT (OUTPATIENT)
Dept: FAMILY MEDICINE | Facility: CLINIC | Age: 85
End: 2020-11-09
Payer: MEDICARE

## 2020-11-09 ENCOUNTER — LAB VISIT (OUTPATIENT)
Dept: LAB | Facility: HOSPITAL | Age: 85
End: 2020-11-09
Attending: INTERNAL MEDICINE
Payer: MEDICARE

## 2020-11-09 VITALS
BODY MASS INDEX: 21.86 KG/M2 | RESPIRATION RATE: 14 BRPM | WEIGHT: 136 LBS | TEMPERATURE: 97 F | SYSTOLIC BLOOD PRESSURE: 160 MMHG | HEIGHT: 66 IN | OXYGEN SATURATION: 97 % | DIASTOLIC BLOOD PRESSURE: 60 MMHG

## 2020-11-09 DIAGNOSIS — D63.8 ANEMIA OF CHRONIC DISEASE: ICD-10-CM

## 2020-11-09 DIAGNOSIS — D53.9 NUTRITIONAL ANEMIA, UNSPECIFIED: ICD-10-CM

## 2020-11-09 DIAGNOSIS — D50.0 IRON DEFICIENCY ANEMIA DUE TO CHRONIC BLOOD LOSS: ICD-10-CM

## 2020-11-09 DIAGNOSIS — D64.89 ANEMIA DUE TO MULTIPLE MECHANISMS: ICD-10-CM

## 2020-11-09 DIAGNOSIS — L29.9 PRURITUS: ICD-10-CM

## 2020-11-09 DIAGNOSIS — R42 VERTIGO: ICD-10-CM

## 2020-11-09 DIAGNOSIS — E56.9 VITAMIN DEFICIENCY: ICD-10-CM

## 2020-11-09 DIAGNOSIS — E83.42 HYPOMAGNESEMIA: ICD-10-CM

## 2020-11-09 DIAGNOSIS — D53.9 MACROCYTIC ANEMIA: ICD-10-CM

## 2020-11-09 DIAGNOSIS — D64.9 ANEMIA, UNSPECIFIED TYPE: ICD-10-CM

## 2020-11-09 DIAGNOSIS — E78.5 HYPERLIPIDEMIA ASSOCIATED WITH TYPE 2 DIABETES MELLITUS: ICD-10-CM

## 2020-11-09 DIAGNOSIS — E11.22 CONTROLLED TYPE 2 DIABETES MELLITUS WITH STAGE 4 CHRONIC KIDNEY DISEASE, WITHOUT LONG-TERM CURRENT USE OF INSULIN: ICD-10-CM

## 2020-11-09 DIAGNOSIS — Q80.9 XERODERMA: ICD-10-CM

## 2020-11-09 DIAGNOSIS — E11.22 TYPE 2 DIABETES MELLITUS WITH STAGE 4 CHRONIC KIDNEY DISEASE, WITHOUT LONG-TERM CURRENT USE OF INSULIN: ICD-10-CM

## 2020-11-09 DIAGNOSIS — E03.9 HYPOTHYROIDISM, UNSPECIFIED TYPE: ICD-10-CM

## 2020-11-09 DIAGNOSIS — J44.89 ASTHMA-COPD OVERLAP SYNDROME: ICD-10-CM

## 2020-11-09 DIAGNOSIS — D63.8 ANEMIA, CHRONIC DISEASE: ICD-10-CM

## 2020-11-09 DIAGNOSIS — D63.1 ANEMIA, CHRONIC RENAL FAILURE, STAGE 3 (MODERATE): ICD-10-CM

## 2020-11-09 DIAGNOSIS — K92.2 GASTROINTESTINAL HEMORRHAGE, UNSPECIFIED GASTROINTESTINAL HEMORRHAGE TYPE: Primary | ICD-10-CM

## 2020-11-09 DIAGNOSIS — E11.59 HYPERTENSION ASSOCIATED WITH DIABETES: ICD-10-CM

## 2020-11-09 DIAGNOSIS — N18.30 ANEMIA, CHRONIC RENAL FAILURE, STAGE 3 (MODERATE): ICD-10-CM

## 2020-11-09 DIAGNOSIS — I15.2 HYPERTENSION ASSOCIATED WITH DIABETES: ICD-10-CM

## 2020-11-09 DIAGNOSIS — N18.4 CONTROLLED TYPE 2 DIABETES MELLITUS WITH STAGE 4 CHRONIC KIDNEY DISEASE, WITHOUT LONG-TERM CURRENT USE OF INSULIN: ICD-10-CM

## 2020-11-09 DIAGNOSIS — N18.4 TYPE 2 DIABETES MELLITUS WITH STAGE 4 CHRONIC KIDNEY DISEASE, WITHOUT LONG-TERM CURRENT USE OF INSULIN: ICD-10-CM

## 2020-11-09 DIAGNOSIS — D64.9 NORMOCYTIC NORMOCHROMIC ANEMIA: ICD-10-CM

## 2020-11-09 DIAGNOSIS — E11.69 HYPERLIPIDEMIA ASSOCIATED WITH TYPE 2 DIABETES MELLITUS: ICD-10-CM

## 2020-11-09 PROCEDURE — 85025 COMPLETE CBC W/AUTO DIFF WBC: CPT

## 2020-11-09 PROCEDURE — 99999 PR PBB SHADOW E&M-EST. PATIENT-LVL IV: CPT | Mod: PBBFAC,,, | Performed by: FAMILY MEDICINE

## 2020-11-09 PROCEDURE — 90662 IIV NO PRSV INCREASED AG IM: CPT | Mod: S$GLB,,, | Performed by: FAMILY MEDICINE

## 2020-11-09 PROCEDURE — 90662 FLU VACCINE - HIGH DOSE (65+) PRESERVATIVE FREE IM: ICD-10-PCS | Mod: S$GLB,,, | Performed by: FAMILY MEDICINE

## 2020-11-09 PROCEDURE — 99499 UNLISTED E&M SERVICE: CPT | Mod: S$GLB,,, | Performed by: FAMILY MEDICINE

## 2020-11-09 PROCEDURE — 36415 COLL VENOUS BLD VENIPUNCTURE: CPT | Mod: PO

## 2020-11-09 PROCEDURE — 99214 OFFICE O/P EST MOD 30 MIN: CPT | Mod: 25,S$GLB,, | Performed by: FAMILY MEDICINE

## 2020-11-09 PROCEDURE — 80053 COMPREHEN METABOLIC PANEL: CPT

## 2020-11-09 PROCEDURE — 82746 ASSAY OF FOLIC ACID SERUM: CPT

## 2020-11-09 PROCEDURE — G0008 FLU VACCINE - HIGH DOSE (65+) PRESERVATIVE FREE IM: ICD-10-PCS | Mod: S$GLB,,, | Performed by: FAMILY MEDICINE

## 2020-11-09 PROCEDURE — 82728 ASSAY OF FERRITIN: CPT

## 2020-11-09 PROCEDURE — 99499 RISK ADDL DX/OHS AUDIT: ICD-10-PCS | Mod: S$GLB,,, | Performed by: FAMILY MEDICINE

## 2020-11-09 PROCEDURE — 99214 PR OFFICE/OUTPT VISIT, EST, LEVL IV, 30-39 MIN: ICD-10-PCS | Mod: 25,S$GLB,, | Performed by: FAMILY MEDICINE

## 2020-11-09 PROCEDURE — 99999 PR PBB SHADOW E&M-EST. PATIENT-LVL IV: ICD-10-PCS | Mod: PBBFAC,,, | Performed by: FAMILY MEDICINE

## 2020-11-09 PROCEDURE — G0008 ADMIN INFLUENZA VIRUS VAC: HCPCS | Mod: S$GLB,,, | Performed by: FAMILY MEDICINE

## 2020-11-09 PROCEDURE — 82607 VITAMIN B-12: CPT

## 2020-11-09 PROCEDURE — 83540 ASSAY OF IRON: CPT

## 2020-11-09 RX ORDER — MECLIZINE HYDROCHLORIDE 25 MG/1
25 TABLET ORAL 3 TIMES DAILY PRN
Qty: 20 TABLET | Status: SHIPPED | OUTPATIENT
Start: 2020-11-09 | End: 2022-01-01

## 2020-11-09 RX ORDER — TRIAMCINOLONE ACETONIDE 1 MG/ML
LOTION TOPICAL 2 TIMES DAILY
Qty: 240 ML | Refills: 0 | Status: SHIPPED | OUTPATIENT
Start: 2020-11-09 | End: 2021-08-26 | Stop reason: ALTCHOICE

## 2020-11-09 RX ORDER — FLUOCINOLONE ACETONIDE 0.11 MG/ML
OIL TOPICAL 2 TIMES DAILY PRN
Qty: 118 ML | Refills: 1 | Status: SHIPPED | OUTPATIENT
Start: 2020-11-09 | End: 2021-01-01

## 2020-11-09 NOTE — PROGRESS NOTES
Subjective:       Patient ID: Blaire Cage is a 89 y.o. female.    Chief Complaint: Transitional Care (F/U [facility])    HPI  Review of Systems   Constitutional: Negative for fatigue, fever and unexpected weight change.   Respiratory: Negative for chest tightness and shortness of breath.    Cardiovascular: Negative for chest pain, palpitations and leg swelling.   Gastrointestinal: Negative for abdominal pain, diarrhea, nausea and vomiting.   Musculoskeletal: Negative for arthralgias.   Skin: Positive for rash.   Neurological: Negative for dizziness, syncope, light-headedness and headaches.       Patient Active Problem List   Diagnosis    Diabetic neuropathy, type II diabetes mellitus    CKD (chronic kidney disease), stage III, eGFR 39, progressive 31    Hypothyroidism    Hypertension associated with diabetes    Hyperlipidemia associated with type 2 diabetes mellitus    Type 2 diabetes mellitus with stage 4 chronic kidney disease    Right carotid bruit    COPD mixed type    Anxiety    Abdominal aortic aneurysm without rupture    Hip arthritis    Degenerative spinal arthritis    Osteoporosis    Left ventricular diastolic dysfunction with preserved systolic function    Hypertensive left ventricular hypertrophy, without heart failure    Smoker, quit 5/2016, 25 pck-years    Abdominal obesity    Absolute anemia    Syncopal episodes, last 4/2020    Body mass index (BMI) 23.0-23.9, adult, today 24.1    Iron deficiency anemia due to chronic blood loss    Proteinuria due to type 2 diabetes mellitus    History of syncope in childhood    Prerenal azotemia    Drug-induced constipation    Asthmatic bronchitis with acute exacerbation    Controlled type 2 diabetes mellitus, without long-term current use of insulin    Acute pulmonary embolism    Asthma-COPD overlap syndrome    Eosinophilic asthma    Moderate malnutrition    Type 2 diabetes mellitus with kidney complication, without long-term current  use of insulin    Chronic anticoagulation    Constipation    DVT (deep venous thrombosis)    History of pulmonary embolism    Anemia due to multiple mechanisms    Chronic anemia    Normocytic normochromic anemia    Anemia, chronic renal failure, stage 3 (moderate)    Homocysteinemia    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    Hyperkalemia    Diastolic CHF, chronic    Anemia of chronic disease    Kidney stones    Macrocytic anemia    Orthostatic hypotension    Closed left hip fracture    Hip pain, left    Chronic respiratory failure with hypoxia    Nonrheumatic aortic valve stenosis    Mitral stenosis    Hypoalbuminemia    Hypoglycemia    CKD (chronic kidney disease), stage IV    Vertigo    Left foot pain    Posterior tibial tendon dysfunction, left    Arthritis    Pulmonary hypertension    Diastolic dysfunction    Status post placement of implantable loop recorder    Acute colitis    GI bleed    Gastrointestinal hemorrhage     Patient is here for a chronic conditions follow up.    C/o itching back and head. Uses shampoo twice weekly. No relief    Admitted [facility] 10/25/20 due to vomiting , diarrhea and weakness found to have UGI bleed. GI consulted. Had EGD showing angioectasias in stomach and duodenum.  Colonoscopy and small bowel enteroscopy showed angiodysplastic lesion transverse colon s/p ablation.  Will have capsule endoscopy outpatient Dr. Kee. E;iquis stopped in hospital but restarted at d/c    Continues to have black stools which she has had since taking oral iron.     Transitional Care Note    Family and/or Caretaker present at visit?  No.  Diagnostic tests reviewed/disposition: No diagnosic tests pending after this hospitalization.  Disease/illness education:   Home health/community services discussion/referrals: Patient does not have home health established from hospital visit.  They do not need home health.  If needed, we will set up home health for the patient.    Establishment or re-establishment of referral orders for community resources: No other necessary community resources.   Discussion with other health care providers: No discussion with other health care providers necessary.     Patient has a recent hospitalization which is summarized above.  Communication (direct contact by telephone or electronic mail) with the patient and/or caregiver was documented within 2 business days of discharge.    Medical decision making was based on a face-to-face visit scheduled within the indicated time frame.  Medication reconciliation and management was completed at this visit.        History:  Admitted [facility] 5/7/20 for dizziness x 1 month. Neuro consulted and no neuro or central cause of dizziness found. Recommended f/u ENT which was scheduled Dr. Rutherford which she did. Meclizine helps. Uses walker .  Radiology:   MRI brain 05/08/2020:  1. No evidence of an acute intracranial abnormality.  2. Moderate generalized cerebral volume loss and chronic ischemic changes, as above.  MRA brain 05/08/2020: No high-grade stenosis, large vessel occlusion, or aneurysm.  Carotid ultrasound 05/08/2020: No evidence of a hemodynamically significant carotid bifurcation stenosis.  There is moderate calcified atherosclerotic plaque seen in both carotid bifurcations.     Card Dr. Herbert AAA    Heme/Onc Dr. Alvarez iron def anemia, h/o DVT and PE with MTHFR-C heterozygous + on eliquis. Also anemia of chronic disease. Monitoring monthly    Pulm Dr. Christopher, now Ronald- asthma -COPD    GI Dr. Flores- UGI bleed    Nephro Dr. Jo -CKD stage 3 b, anemia of chronic diz, hyperuricemia    Podiatry Dr. rodriguez foot arthritis     Objective:      Physical Exam  Vitals signs and nursing note reviewed.   Constitutional:       Appearance: She is well-developed.   Cardiovascular:      Rate and Rhythm: Normal rate and regular rhythm.      Heart sounds: Normal heart sounds.   Pulmonary:      Effort: Pulmonary effort is normal.       Breath sounds: Normal breath sounds.   Skin:     General: Skin is warm and dry.          Neurological:      Mental Status: She is alert and oriented to person, place, and time.         Assessment:       1. Gastrointestinal hemorrhage, unspecified gastrointestinal hemorrhage type    2. Controlled type 2 diabetes mellitus with stage 4 chronic kidney disease, without long-term current use of insulin    3. Hypothyroidism, unspecified type    4. Type 2 diabetes mellitus with stage 4 chronic kidney disease, without long-term current use of insulin    5. Iron deficiency anemia due to chronic blood loss    6. Anemia of chronic disease    7. Hypertension associated with diabetes    8. Hyperlipidemia associated with type 2 diabetes mellitus    9. Vertigo    10. Asthma-COPD overlap syndrome    11. Hypomagnesemia    12. Vitamin deficiency    13. Pruritus    14. Xeroderma        Plan:       1. Gastrointestinal hemorrhage, unspecified gastrointestinal hemorrhage type  Continues to have black stools. Hold iron and f/u GI as planned next week.  Cont monitoring labs monthly with heme/onc. Monitor for worsening symptoms and return to clinic or go to the ER if these occur: fever > 100.4, severe abdominal pain, intractable vomiting, bleeding from the rectum or black tarry stools, dehydration, lethargy or other worsening symptoms.      2. Controlled type 2 diabetes mellitus with stage 4 chronic kidney disease, without long-term current use of insulin  Stable condition.  Continue current medications.  Will adjust based on lab findings or if condition changes.      3. Hypothyroidism, unspecified type  Stable condition.  Continue current medications.  Will adjust based on lab findings or if condition changes.    - TSH; Future  - T4, Free; Future    4. Type 2 diabetes mellitus with stage 4 chronic kidney disease, without long-term current use of insulin  Stable condition.  Continue current medications.  Will adjust based on lab  findings or if condition changes.    - Comprehensive Metabolic Panel; Future  - Vitamin D; Future    5. Iron deficiency anemia due to chronic blood loss  Cont heme/onc monitoring. Stop oral iron  - CBC Auto Differential; Future  - Iron and TIBC; Future  - Ferritin; Future    6. Anemia of chronic disease  Cont monitoring    7. Hypertension associated with diabetes  Uncontrolled but erratic.  Cont monitoring and current meds    8. Hyperlipidemia associated with type 2 diabetes mellitus  Stable condition.  Continue current medications.  Will adjust based on lab findings or if condition changes.    - Lipid Panel; Future    9. Vertigo  Cont prn  - meclizine (ANTIVERT) 25 mg tablet; Take 1 tablet (25 mg total) by mouth 3 (three) times daily as needed.  Dispense: 20 tablet; Refill: prn    10. Asthma-COPD overlap syndrome  Cont current mgmt    11. Hypomagnesemia  Screen and treat as indicated:    - Magnesium; Future    12. Vitamin deficiency  Screen and treat as indicated:    - Vitamin D; Future    13. Pruritus  Apply triamcinolone lotion to affected area bid. I counseled the patient on general atopic skin care such as avoidance of prolonged bathing (prefer short lukewarm showers with less frequency than daily), liberal use of moisturizers like OTC eucerin creme within 3 minutes of bathing, using moisturizing soaps otc and avoidance of skin irritants.  Avoid rubbing and scratching.  Apply prescription topical steroid creams or lotions as directed to affected areas as needed for maximum 4 weeks in specific area.        14. Xeroderma  SEE ABOVE          Time spent with patient: 20 minutes    Patient with be reevaluated in 4 weeks or sooner prn    Greater than 50% of this visit was spent counseling as described in above documentation:Yes

## 2020-11-09 NOTE — PATIENT INSTRUCTIONS
Recommend otc eucerin creme in jar for back and skin dryness.  Apply liberally after bathing and prn

## 2020-11-10 LAB
ALBUMIN SERPL BCP-MCNC: 3.5 G/DL (ref 3.5–5.2)
ALP SERPL-CCNC: 70 U/L (ref 55–135)
ALT SERPL W/O P-5'-P-CCNC: 17 U/L (ref 10–44)
ANION GAP SERPL CALC-SCNC: 12 MMOL/L (ref 8–16)
AST SERPL-CCNC: 26 U/L (ref 10–40)
BASOPHILS # BLD AUTO: 0.03 K/UL (ref 0–0.2)
BASOPHILS NFR BLD: 0.3 % (ref 0–1.9)
BILIRUB SERPL-MCNC: 0.2 MG/DL (ref 0.1–1)
BUN SERPL-MCNC: 48 MG/DL (ref 8–23)
CALCIUM SERPL-MCNC: 8.6 MG/DL (ref 8.7–10.5)
CHLORIDE SERPL-SCNC: 109 MMOL/L (ref 95–110)
CO2 SERPL-SCNC: 23 MMOL/L (ref 23–29)
CREAT SERPL-MCNC: 1.8 MG/DL (ref 0.5–1.4)
DIFFERENTIAL METHOD: ABNORMAL
EOSINOPHIL # BLD AUTO: 1.7 K/UL (ref 0–0.5)
EOSINOPHIL NFR BLD: 16.5 % (ref 0–8)
ERYTHROCYTE [DISTWIDTH] IN BLOOD BY AUTOMATED COUNT: 14.7 % (ref 11.5–14.5)
EST. GFR  (AFRICAN AMERICAN): 28.2 ML/MIN/1.73 M^2
EST. GFR  (NON AFRICAN AMERICAN): 24.4 ML/MIN/1.73 M^2
FERRITIN SERPL-MCNC: 148 NG/ML (ref 20–300)
FOLATE SERPL-MCNC: 17.6 NG/ML (ref 4–24)
GLUCOSE SERPL-MCNC: 74 MG/DL (ref 70–110)
HCT VFR BLD AUTO: 28.1 % (ref 37–48.5)
HGB BLD-MCNC: 8.9 G/DL (ref 12–16)
IMM GRANULOCYTES # BLD AUTO: 0.04 K/UL (ref 0–0.04)
IMM GRANULOCYTES NFR BLD AUTO: 0.4 % (ref 0–0.5)
IRON SERPL-MCNC: 59 UG/DL (ref 30–160)
LYMPHOCYTES # BLD AUTO: 1.4 K/UL (ref 1–4.8)
LYMPHOCYTES NFR BLD: 13.4 % (ref 18–48)
MCH RBC QN AUTO: 33.3 PG (ref 27–31)
MCHC RBC AUTO-ENTMCNC: 31.7 G/DL (ref 32–36)
MCV RBC AUTO: 105 FL (ref 82–98)
MONOCYTES # BLD AUTO: 1 K/UL (ref 0.3–1)
MONOCYTES NFR BLD: 9.4 % (ref 4–15)
NEUTROPHILS # BLD AUTO: 6.1 K/UL (ref 1.8–7.7)
NEUTROPHILS NFR BLD: 60 % (ref 38–73)
NRBC BLD-RTO: 0 /100 WBC
PLATELET # BLD AUTO: 265 K/UL (ref 150–350)
PMV BLD AUTO: 11.5 FL (ref 9.2–12.9)
POTASSIUM SERPL-SCNC: 4.2 MMOL/L (ref 3.5–5.1)
PROT SERPL-MCNC: 6.6 G/DL (ref 6–8.4)
RBC # BLD AUTO: 2.67 M/UL (ref 4–5.4)
SATURATED IRON: 21 % (ref 20–50)
SODIUM SERPL-SCNC: 144 MMOL/L (ref 136–145)
TOTAL IRON BINDING CAPACITY: 283 UG/DL (ref 250–450)
TRANSFERRIN SERPL-MCNC: 191 MG/DL (ref 200–375)
VIT B12 SERPL-MCNC: >2000 PG/ML (ref 210–950)
WBC # BLD AUTO: 10.13 K/UL (ref 3.9–12.7)

## 2020-11-10 NOTE — ANESTHESIA POSTPROCEDURE EVALUATION
Anesthesia Post Evaluation    Patient: Blaire Cage    Procedure(s) Performed: Procedure(s) (LRB):  COLONOSCOPY (Left)  ENTEROSCOPY (N/A)    Final Anesthesia Type: MAC    Patient location during evaluation: GI PACU  Patient participation: Yes- Able to Participate  Level of consciousness: awake and alert  Post-procedure vital signs: reviewed and stable  Pain management: adequate  Airway patency: patent    PONV status at discharge: No PONV  Anesthetic complications: no      Cardiovascular status: hemodynamically stable  Respiratory status: unassisted, spontaneous ventilation and room air  Hydration status: euvolemic  Follow-up not needed.          Vitals Value Taken Time   /60 11/09/20 1509   Temp 36.3 °C (97.3 °F) 11/09/20 1509   Pulse 70 10/29/20 1603   Resp 14 11/09/20 1509   SpO2 97 % 11/09/20 1509         Event Time   Out of Recovery 10/29/2020 14:30:00         Pain/Mandeep Score: No data recorded

## 2020-11-16 ENCOUNTER — LAB VISIT (OUTPATIENT)
Dept: LAB | Facility: HOSPITAL | Age: 85
End: 2020-11-16
Attending: INTERNAL MEDICINE
Payer: MEDICARE

## 2020-11-16 DIAGNOSIS — R80.9 PROTEINURIA: ICD-10-CM

## 2020-11-16 DIAGNOSIS — N18.30 CHRONIC KIDNEY DISEASE, STAGE III (MODERATE): Primary | ICD-10-CM

## 2020-11-16 DIAGNOSIS — E79.0 URICACIDEMIA: ICD-10-CM

## 2020-11-16 DIAGNOSIS — K55.20 ANGIODYSPLASIA OF INTESTINAL TRACT: ICD-10-CM

## 2020-11-16 DIAGNOSIS — D64.9 ANEMIA, UNSPECIFIED: ICD-10-CM

## 2020-11-16 LAB
ALBUMIN SERPL BCP-MCNC: 3.6 G/DL (ref 3.5–5.2)
ANION GAP SERPL CALC-SCNC: 9 MMOL/L (ref 8–16)
BACTERIA #/AREA URNS HPF: ABNORMAL /HPF
BASOPHILS # BLD AUTO: 0.03 K/UL (ref 0–0.2)
BASOPHILS NFR BLD: 0.4 % (ref 0–1.9)
BILIRUB UR QL STRIP: NEGATIVE
BUN SERPL-MCNC: 28 MG/DL (ref 8–23)
CALCIUM SERPL-MCNC: 9.1 MG/DL (ref 8.7–10.5)
CHLORIDE SERPL-SCNC: 107 MMOL/L (ref 95–110)
CLARITY UR: CLEAR
CO2 SERPL-SCNC: 27 MMOL/L (ref 23–29)
COLOR UR: YELLOW
CREAT SERPL-MCNC: 1.7 MG/DL (ref 0.5–1.4)
CREAT UR-MCNC: 95.5 MG/DL (ref 15–325)
DIFFERENTIAL METHOD: ABNORMAL
EOSINOPHIL # BLD AUTO: 0.9 K/UL (ref 0–0.5)
EOSINOPHIL NFR BLD: 11.6 % (ref 0–8)
ERYTHROCYTE [DISTWIDTH] IN BLOOD BY AUTOMATED COUNT: 14.9 % (ref 11.5–14.5)
EST. GFR  (AFRICAN AMERICAN): 30 ML/MIN/1.73 M^2
EST. GFR  (NON AFRICAN AMERICAN): 26 ML/MIN/1.73 M^2
GLUCOSE SERPL-MCNC: 114 MG/DL (ref 70–110)
GLUCOSE UR QL STRIP: NEGATIVE
HCT VFR BLD AUTO: 30.5 % (ref 37–48.5)
HGB BLD-MCNC: 9.6 G/DL (ref 12–16)
HGB UR QL STRIP: NEGATIVE
HYALINE CASTS #/AREA URNS LPF: 25 /LPF
IMM GRANULOCYTES # BLD AUTO: 0.03 K/UL (ref 0–0.04)
IMM GRANULOCYTES NFR BLD AUTO: 0.4 % (ref 0–0.5)
KETONES UR QL STRIP: NEGATIVE
LEUKOCYTE ESTERASE UR QL STRIP: ABNORMAL
LYMPHOCYTES # BLD AUTO: 1.4 K/UL (ref 1–4.8)
LYMPHOCYTES NFR BLD: 18.6 % (ref 18–48)
MCH RBC QN AUTO: 32.4 PG (ref 27–31)
MCHC RBC AUTO-ENTMCNC: 31.5 G/DL (ref 32–36)
MCV RBC AUTO: 103 FL (ref 82–98)
MICROSCOPIC COMMENT: ABNORMAL
MONOCYTES # BLD AUTO: 0.7 K/UL (ref 0.3–1)
MONOCYTES NFR BLD: 9.8 % (ref 4–15)
NEUTROPHILS # BLD AUTO: 4.3 K/UL (ref 1.8–7.7)
NEUTROPHILS NFR BLD: 59.2 % (ref 38–73)
NITRITE UR QL STRIP: NEGATIVE
NRBC BLD-RTO: 0 /100 WBC
PH UR STRIP: 6 [PH] (ref 5–8)
PHOSPHATE SERPL-MCNC: 3.7 MG/DL (ref 2.7–4.5)
PLATELET # BLD AUTO: 197 K/UL (ref 150–350)
PMV BLD AUTO: 10.6 FL (ref 9.2–12.9)
POTASSIUM SERPL-SCNC: 4.9 MMOL/L (ref 3.5–5.1)
PROT UR QL STRIP: ABNORMAL
PROT UR-MCNC: 98 MG/DL (ref 0–15)
PROT/CREAT UR: 1.03 MG/G{CREAT} (ref 0–0.2)
RBC # BLD AUTO: 2.96 M/UL (ref 4–5.4)
RBC #/AREA URNS HPF: 2 /HPF (ref 0–4)
SODIUM SERPL-SCNC: 143 MMOL/L (ref 136–145)
SP GR UR STRIP: 1.01 (ref 1–1.03)
SQUAMOUS #/AREA URNS HPF: 3 /HPF
URATE SERPL-MCNC: 5.4 MG/DL (ref 2.4–5.7)
URN SPEC COLLECT METH UR: ABNORMAL
UROBILINOGEN UR STRIP-ACNC: NEGATIVE EU/DL
WBC # BLD AUTO: 7.31 K/UL (ref 3.9–12.7)
WBC #/AREA URNS HPF: 15 /HPF (ref 0–5)

## 2020-11-16 PROCEDURE — 84166 PROTEIN E-PHORESIS/URINE/CSF: CPT

## 2020-11-16 PROCEDURE — 84156 ASSAY OF PROTEIN URINE: CPT

## 2020-11-16 PROCEDURE — 80069 RENAL FUNCTION PANEL: CPT

## 2020-11-16 PROCEDURE — 84550 ASSAY OF BLOOD/URIC ACID: CPT

## 2020-11-16 PROCEDURE — 81000 URINALYSIS NONAUTO W/SCOPE: CPT

## 2020-11-16 PROCEDURE — 85025 COMPLETE CBC W/AUTO DIFF WBC: CPT | Mod: 91

## 2020-11-24 ENCOUNTER — CLINICAL SUPPORT (OUTPATIENT)
Dept: CARDIOLOGY | Facility: CLINIC | Age: 85
End: 2020-11-24
Payer: MEDICARE

## 2020-11-24 DIAGNOSIS — Z95.818 PRESENCE OF OTHER CARDIAC IMPLANTS AND GRAFTS: ICD-10-CM

## 2020-11-24 PROCEDURE — 93298 CARDIAC DEVICE CHECK - REMOTE: ICD-10-PCS | Mod: S$GLB,,, | Performed by: INTERNAL MEDICINE

## 2020-11-24 PROCEDURE — 93298 REM INTERROG DEV EVAL SCRMS: CPT | Mod: S$GLB,,, | Performed by: INTERNAL MEDICINE

## 2020-11-27 DIAGNOSIS — J44.9 COPD MIXED TYPE: ICD-10-CM

## 2020-11-27 RX ORDER — PREDNISONE 20 MG/1
20 TABLET ORAL DAILY
Qty: 12 TABLET | Refills: 0 | Status: CANCELLED | OUTPATIENT
Start: 2020-11-27 | End: 2020-11-30

## 2020-11-30 RX ORDER — PREDNISONE 20 MG/1
20 TABLET ORAL DAILY
Qty: 12 TABLET | Refills: 0 | Status: SHIPPED | OUTPATIENT
Start: 2020-11-30 | End: 2020-12-03

## 2020-12-09 ENCOUNTER — LAB VISIT (OUTPATIENT)
Dept: LAB | Facility: HOSPITAL | Age: 85
End: 2020-12-09
Attending: NURSE PRACTITIONER
Payer: MEDICARE

## 2020-12-09 ENCOUNTER — TELEPHONE (OUTPATIENT)
Dept: FAMILY MEDICINE | Facility: CLINIC | Age: 85
End: 2020-12-09

## 2020-12-09 DIAGNOSIS — N18.4 TYPE 2 DIABETES MELLITUS WITH STAGE 4 CHRONIC KIDNEY DISEASE, WITHOUT LONG-TERM CURRENT USE OF INSULIN: ICD-10-CM

## 2020-12-09 DIAGNOSIS — E56.9 VITAMIN DEFICIENCY: ICD-10-CM

## 2020-12-09 DIAGNOSIS — E11.69 HYPERLIPIDEMIA ASSOCIATED WITH TYPE 2 DIABETES MELLITUS: ICD-10-CM

## 2020-12-09 DIAGNOSIS — E11.40 TYPE 2 DIABETES MELLITUS WITH DIABETIC NEUROPATHY, WITHOUT LONG-TERM CURRENT USE OF INSULIN: ICD-10-CM

## 2020-12-09 DIAGNOSIS — E03.9 HYPOTHYROIDISM, UNSPECIFIED TYPE: ICD-10-CM

## 2020-12-09 DIAGNOSIS — E11.22 TYPE 2 DIABETES MELLITUS WITH STAGE 4 CHRONIC KIDNEY DISEASE, WITHOUT LONG-TERM CURRENT USE OF INSULIN: ICD-10-CM

## 2020-12-09 DIAGNOSIS — E78.5 HYPERLIPIDEMIA ASSOCIATED WITH TYPE 2 DIABETES MELLITUS: ICD-10-CM

## 2020-12-09 DIAGNOSIS — E83.42 HYPOMAGNESEMIA: ICD-10-CM

## 2020-12-09 DIAGNOSIS — D50.0 IRON DEFICIENCY ANEMIA DUE TO CHRONIC BLOOD LOSS: ICD-10-CM

## 2020-12-09 LAB
25(OH)D3+25(OH)D2 SERPL-MCNC: 39 NG/ML (ref 30–96)
ALBUMIN SERPL BCP-MCNC: 3.8 G/DL (ref 3.5–5.2)
ALP SERPL-CCNC: 76 U/L (ref 55–135)
ALT SERPL W/O P-5'-P-CCNC: 18 U/L (ref 10–44)
ANION GAP SERPL CALC-SCNC: 10 MMOL/L (ref 8–16)
AST SERPL-CCNC: 23 U/L (ref 10–40)
BASOPHILS # BLD AUTO: 0.05 K/UL (ref 0–0.2)
BASOPHILS NFR BLD: 0.6 % (ref 0–1.9)
BILIRUB SERPL-MCNC: 0.4 MG/DL (ref 0.1–1)
BUN SERPL-MCNC: 64 MG/DL (ref 8–23)
CALCIUM SERPL-MCNC: 9.2 MG/DL (ref 8.7–10.5)
CHLORIDE SERPL-SCNC: 104 MMOL/L (ref 95–110)
CHOLEST SERPL-MCNC: 207 MG/DL (ref 120–199)
CHOLEST/HDLC SERPL: 3 {RATIO} (ref 2–5)
CO2 SERPL-SCNC: 26 MMOL/L (ref 23–29)
CREAT SERPL-MCNC: 2.6 MG/DL (ref 0.5–1.4)
DIFFERENTIAL METHOD: ABNORMAL
EOSINOPHIL # BLD AUTO: 0.4 K/UL (ref 0–0.5)
EOSINOPHIL NFR BLD: 4 % (ref 0–8)
ERYTHROCYTE [DISTWIDTH] IN BLOOD BY AUTOMATED COUNT: 13.8 % (ref 11.5–14.5)
EST. GFR  (AFRICAN AMERICAN): 18.1 ML/MIN/1.73 M^2
EST. GFR  (NON AFRICAN AMERICAN): 15.7 ML/MIN/1.73 M^2
ESTIMATED AVG GLUCOSE: 111 MG/DL (ref 68–131)
FERRITIN SERPL-MCNC: 136 NG/ML (ref 20–300)
GLUCOSE SERPL-MCNC: 103 MG/DL (ref 70–110)
HBA1C MFR BLD HPLC: 5.5 % (ref 4–5.6)
HCT VFR BLD AUTO: 34 % (ref 37–48.5)
HDLC SERPL-MCNC: 69 MG/DL (ref 40–75)
HDLC SERPL: 33.3 % (ref 20–50)
HGB BLD-MCNC: 10.6 G/DL (ref 12–16)
IMM GRANULOCYTES # BLD AUTO: 0.08 K/UL (ref 0–0.04)
IMM GRANULOCYTES NFR BLD AUTO: 0.9 % (ref 0–0.5)
IRON SERPL-MCNC: 49 UG/DL (ref 30–160)
LDLC SERPL CALC-MCNC: 111.6 MG/DL (ref 63–159)
LYMPHOCYTES # BLD AUTO: 2.9 K/UL (ref 1–4.8)
LYMPHOCYTES NFR BLD: 33 % (ref 18–48)
MAGNESIUM SERPL-MCNC: 2.3 MG/DL (ref 1.6–2.6)
MCH RBC QN AUTO: 32.1 PG (ref 27–31)
MCHC RBC AUTO-ENTMCNC: 31.2 G/DL (ref 32–36)
MCV RBC AUTO: 103 FL (ref 82–98)
MONOCYTES # BLD AUTO: 0.9 K/UL (ref 0.3–1)
MONOCYTES NFR BLD: 10 % (ref 4–15)
NEUTROPHILS # BLD AUTO: 4.5 K/UL (ref 1.8–7.7)
NEUTROPHILS NFR BLD: 51.5 % (ref 38–73)
NONHDLC SERPL-MCNC: 138 MG/DL
NRBC BLD-RTO: 0 /100 WBC
PLATELET # BLD AUTO: 217 K/UL (ref 150–350)
PMV BLD AUTO: 12.1 FL (ref 9.2–12.9)
POTASSIUM SERPL-SCNC: 4.2 MMOL/L (ref 3.5–5.1)
PROT SERPL-MCNC: 6.9 G/DL (ref 6–8.4)
RBC # BLD AUTO: 3.3 M/UL (ref 4–5.4)
SATURATED IRON: 15 % (ref 20–50)
SODIUM SERPL-SCNC: 140 MMOL/L (ref 136–145)
T4 FREE SERPL-MCNC: 1.06 NG/DL (ref 0.71–1.51)
TOTAL IRON BINDING CAPACITY: 329 UG/DL (ref 250–450)
TRANSFERRIN SERPL-MCNC: 222 MG/DL (ref 200–375)
TRIGL SERPL-MCNC: 132 MG/DL (ref 30–150)
TSH SERPL DL<=0.005 MIU/L-ACNC: 3.8 UIU/ML (ref 0.4–4)
WBC # BLD AUTO: 8.66 K/UL (ref 3.9–12.7)

## 2020-12-09 PROCEDURE — 85025 COMPLETE CBC W/AUTO DIFF WBC: CPT

## 2020-12-09 PROCEDURE — 84439 ASSAY OF FREE THYROXINE: CPT

## 2020-12-09 PROCEDURE — 83735 ASSAY OF MAGNESIUM: CPT

## 2020-12-09 PROCEDURE — 36415 COLL VENOUS BLD VENIPUNCTURE: CPT | Mod: PO

## 2020-12-09 PROCEDURE — 82728 ASSAY OF FERRITIN: CPT

## 2020-12-09 PROCEDURE — 80053 COMPREHEN METABOLIC PANEL: CPT

## 2020-12-09 PROCEDURE — 82306 VITAMIN D 25 HYDROXY: CPT

## 2020-12-09 PROCEDURE — 83036 HEMOGLOBIN GLYCOSYLATED A1C: CPT

## 2020-12-09 PROCEDURE — 84443 ASSAY THYROID STIM HORMONE: CPT

## 2020-12-09 PROCEDURE — 83540 ASSAY OF IRON: CPT

## 2020-12-09 PROCEDURE — 80061 LIPID PANEL: CPT

## 2020-12-14 NOTE — TELEPHONE ENCOUNTER
Received fax from [facility] requesting refill of Levothyroxine 88 mcg.  Please advise.     LOV--11-9-2020   FOV-- 1-5-2021

## 2020-12-15 RX ORDER — LEVOTHYROXINE SODIUM 88 UG/1
88 TABLET ORAL DAILY
Qty: 90 TABLET | Refills: 3 | Status: SHIPPED | OUTPATIENT
Start: 2020-12-15 | End: 2022-01-01

## 2020-12-22 ENCOUNTER — TELEPHONE (OUTPATIENT)
Dept: FAMILY MEDICINE | Facility: CLINIC | Age: 85
End: 2020-12-22

## 2020-12-22 NOTE — TELEPHONE ENCOUNTER
----- Message from Abdirahman Smiley sent at 12/21/2020  2:38 PM CST -----  Contact: Dtr/Toya Pittman stated she will be dropping off a form for financial assistance for patients Rx for her Eliquis.  Toya stated it has to be sent back prior to 12/27/20.  Toya would like a call back at [phone]

## 2020-12-24 ENCOUNTER — CLINICAL SUPPORT (OUTPATIENT)
Dept: CARDIOLOGY | Facility: CLINIC | Age: 85
End: 2020-12-24
Payer: MEDICARE

## 2020-12-24 DIAGNOSIS — Z95.818 PRESENCE OF OTHER CARDIAC IMPLANTS AND GRAFTS: ICD-10-CM

## 2020-12-24 PROCEDURE — 93298 CARDIAC DEVICE CHECK - REMOTE: ICD-10-PCS | Mod: S$GLB,,, | Performed by: INTERNAL MEDICINE

## 2020-12-24 PROCEDURE — 93298 REM INTERROG DEV EVAL SCRMS: CPT | Mod: S$GLB,,, | Performed by: INTERNAL MEDICINE

## 2021-01-01 ENCOUNTER — TELEPHONE (OUTPATIENT)
Dept: CARDIOLOGY | Facility: CLINIC | Age: 86
End: 2021-01-01
Payer: MEDICARE

## 2021-01-01 ENCOUNTER — LAB VISIT (OUTPATIENT)
Dept: LAB | Facility: HOSPITAL | Age: 86
End: 2021-01-01
Attending: FAMILY MEDICINE
Payer: MEDICARE

## 2021-01-01 ENCOUNTER — PATIENT OUTREACH (OUTPATIENT)
Dept: ADMINISTRATIVE | Facility: OTHER | Age: 86
End: 2021-01-01
Payer: MEDICARE

## 2021-01-01 ENCOUNTER — OFFICE VISIT (OUTPATIENT)
Dept: CARDIOLOGY | Facility: CLINIC | Age: 86
End: 2021-01-01
Payer: MEDICARE

## 2021-01-01 ENCOUNTER — LAB VISIT (OUTPATIENT)
Dept: LAB | Facility: HOSPITAL | Age: 86
End: 2021-01-01
Attending: NURSE PRACTITIONER
Payer: MEDICARE

## 2021-01-01 ENCOUNTER — LAB VISIT (OUTPATIENT)
Dept: LAB | Facility: HOSPITAL | Age: 86
End: 2021-01-01
Attending: INTERNAL MEDICINE
Payer: MEDICARE

## 2021-01-01 ENCOUNTER — TELEPHONE (OUTPATIENT)
Dept: PHYSICAL MEDICINE AND REHAB | Facility: CLINIC | Age: 86
End: 2021-01-01
Payer: MEDICARE

## 2021-01-01 ENCOUNTER — EXTERNAL HOME HEALTH (OUTPATIENT)
Dept: HOME HEALTH SERVICES | Facility: HOSPITAL | Age: 86
End: 2021-01-01
Payer: MEDICARE

## 2021-01-01 ENCOUNTER — TELEPHONE (OUTPATIENT)
Dept: ORTHOPEDICS | Facility: CLINIC | Age: 86
End: 2021-01-01
Payer: MEDICARE

## 2021-01-01 ENCOUNTER — OFFICE VISIT (OUTPATIENT)
Dept: HEMATOLOGY/ONCOLOGY | Facility: CLINIC | Age: 86
End: 2021-01-01
Payer: MEDICARE

## 2021-01-01 ENCOUNTER — TELEPHONE (OUTPATIENT)
Dept: FAMILY MEDICINE | Facility: CLINIC | Age: 86
End: 2021-01-01
Payer: MEDICARE

## 2021-01-01 ENCOUNTER — IMMUNIZATION (OUTPATIENT)
Dept: PRIMARY CARE CLINIC | Facility: CLINIC | Age: 86
End: 2021-01-01
Payer: MEDICARE

## 2021-01-01 ENCOUNTER — TELEPHONE (OUTPATIENT)
Dept: UROLOGY | Facility: CLINIC | Age: 86
End: 2021-01-01
Payer: MEDICARE

## 2021-01-01 ENCOUNTER — DOCUMENT SCAN (OUTPATIENT)
Dept: HOME HEALTH SERVICES | Facility: HOSPITAL | Age: 86
End: 2021-01-01
Payer: MEDICARE

## 2021-01-01 ENCOUNTER — INFUSION (OUTPATIENT)
Dept: INFUSION THERAPY | Facility: HOSPITAL | Age: 86
End: 2021-01-01
Attending: INTERNAL MEDICINE
Payer: MEDICARE

## 2021-01-01 ENCOUNTER — TELEPHONE (OUTPATIENT)
Dept: HEMATOLOGY/ONCOLOGY | Facility: CLINIC | Age: 86
End: 2021-01-01
Payer: MEDICARE

## 2021-01-01 ENCOUNTER — DOCUMENTATION ONLY (OUTPATIENT)
Dept: INFUSION THERAPY | Facility: HOSPITAL | Age: 86
End: 2021-01-01

## 2021-01-01 ENCOUNTER — OFFICE VISIT (OUTPATIENT)
Dept: FAMILY MEDICINE | Facility: CLINIC | Age: 86
End: 2021-01-01
Payer: MEDICARE

## 2021-01-01 ENCOUNTER — HOSPITAL ENCOUNTER (OUTPATIENT)
Dept: RADIOLOGY | Facility: HOSPITAL | Age: 86
Discharge: HOME | End: 2021-11-11
Attending: INTERNAL MEDICINE
Payer: MEDICARE

## 2021-01-01 ENCOUNTER — PES CALL (OUTPATIENT)
Dept: ADMINISTRATIVE | Facility: CLINIC | Age: 86
End: 2021-01-01
Payer: MEDICARE

## 2021-01-01 ENCOUNTER — PATIENT MESSAGE (OUTPATIENT)
Dept: PHYSICAL MEDICINE AND REHAB | Facility: CLINIC | Age: 86
End: 2021-01-01
Payer: MEDICARE

## 2021-01-01 ENCOUNTER — TELEPHONE (OUTPATIENT)
Dept: INFUSION THERAPY | Facility: HOSPITAL | Age: 86
End: 2021-01-01
Payer: MEDICARE

## 2021-01-01 VITALS
TEMPERATURE: 98 F | HEART RATE: 71 BPM | SYSTOLIC BLOOD PRESSURE: 200 MMHG | OXYGEN SATURATION: 97 % | HEART RATE: 55 BPM | WEIGHT: 123 LBS | WEIGHT: 125.19 LBS | HEIGHT: 63 IN | BODY MASS INDEX: 21.79 KG/M2 | HEIGHT: 63 IN | RESPIRATION RATE: 18 BRPM | BODY MASS INDEX: 22.18 KG/M2 | DIASTOLIC BLOOD PRESSURE: 68 MMHG | RESPIRATION RATE: 16 BRPM | DIASTOLIC BLOOD PRESSURE: 73 MMHG | SYSTOLIC BLOOD PRESSURE: 170 MMHG

## 2021-01-01 VITALS
DIASTOLIC BLOOD PRESSURE: 74 MMHG | WEIGHT: 126 LBS | RESPIRATION RATE: 16 BRPM | HEIGHT: 63 IN | BODY MASS INDEX: 22.32 KG/M2 | HEART RATE: 57 BPM | SYSTOLIC BLOOD PRESSURE: 142 MMHG | OXYGEN SATURATION: 98 %

## 2021-01-01 VITALS
SYSTOLIC BLOOD PRESSURE: 150 MMHG | OXYGEN SATURATION: 98 % | BODY MASS INDEX: 22.86 KG/M2 | DIASTOLIC BLOOD PRESSURE: 64 MMHG | SYSTOLIC BLOOD PRESSURE: 134 MMHG | OXYGEN SATURATION: 96 % | HEIGHT: 63 IN | WEIGHT: 129 LBS | WEIGHT: 129 LBS | HEIGHT: 63 IN | RESPIRATION RATE: 16 BRPM | DIASTOLIC BLOOD PRESSURE: 76 MMHG | TEMPERATURE: 98 F | HEART RATE: 67 BPM | HEART RATE: 74 BPM | BODY MASS INDEX: 22.86 KG/M2

## 2021-01-01 DIAGNOSIS — D64.9 NORMOCYTIC NORMOCHROMIC ANEMIA: Primary | ICD-10-CM

## 2021-01-01 DIAGNOSIS — R09.89 RIGHT CAROTID BRUIT: ICD-10-CM

## 2021-01-01 DIAGNOSIS — N39.0 RECURRENT UTI (URINARY TRACT INFECTION): Primary | ICD-10-CM

## 2021-01-01 DIAGNOSIS — D50.9 IRON DEFICIENCY ANEMIA, UNSPECIFIED IRON DEFICIENCY ANEMIA TYPE: ICD-10-CM

## 2021-01-01 DIAGNOSIS — E11.59 HYPERTENSION ASSOCIATED WITH DIABETES: ICD-10-CM

## 2021-01-01 DIAGNOSIS — D64.9 NORMOCYTIC NORMOCHROMIC ANEMIA: ICD-10-CM

## 2021-01-01 DIAGNOSIS — R80.9 PROTEINURIA: ICD-10-CM

## 2021-01-01 DIAGNOSIS — D64.9 CHRONIC ANEMIA: ICD-10-CM

## 2021-01-01 DIAGNOSIS — J44.9 COPD MIXED TYPE: ICD-10-CM

## 2021-01-01 DIAGNOSIS — N18.30 ANEMIA, CHRONIC RENAL FAILURE, STAGE 3 (MODERATE): ICD-10-CM

## 2021-01-01 DIAGNOSIS — D53.9 MACROCYTIC ANEMIA: ICD-10-CM

## 2021-01-01 DIAGNOSIS — N18.30 STAGE 3 CHRONIC KIDNEY DISEASE, UNSPECIFIED WHETHER STAGE 3A OR 3B CKD: ICD-10-CM

## 2021-01-01 DIAGNOSIS — N39.0 URINARY TRACT INFECTION, SITE NOT SPECIFIED: Primary | ICD-10-CM

## 2021-01-01 DIAGNOSIS — E03.9 HYPOTHYROIDISM, UNSPECIFIED TYPE: Chronic | ICD-10-CM

## 2021-01-01 DIAGNOSIS — D63.1 ANEMIA, CHRONIC RENAL FAILURE, STAGE 3 (MODERATE): ICD-10-CM

## 2021-01-01 DIAGNOSIS — E11.69 HYPERLIPIDEMIA ASSOCIATED WITH TYPE 2 DIABETES MELLITUS: Primary | ICD-10-CM

## 2021-01-01 DIAGNOSIS — E79.0 URICACIDEMIA: ICD-10-CM

## 2021-01-01 DIAGNOSIS — N18.30 ANEMIA DUE TO STAGE 3 CHRONIC KIDNEY DISEASE TREATED WITH ERYTHROPOIETIN: ICD-10-CM

## 2021-01-01 DIAGNOSIS — M79.605 LEG PAIN, BILATERAL: ICD-10-CM

## 2021-01-01 DIAGNOSIS — R60.0 EDEMA OF BOTH LOWER EXTREMITIES: Primary | ICD-10-CM

## 2021-01-01 DIAGNOSIS — E11.69 HYPERLIPIDEMIA ASSOCIATED WITH TYPE 2 DIABETES MELLITUS: ICD-10-CM

## 2021-01-01 DIAGNOSIS — D64.9 ANEMIA, UNSPECIFIED: Primary | ICD-10-CM

## 2021-01-01 DIAGNOSIS — I50.33 ACUTE ON CHRONIC DIASTOLIC HEART FAILURE: ICD-10-CM

## 2021-01-01 DIAGNOSIS — J44.9 COPD MIXED TYPE: Chronic | ICD-10-CM

## 2021-01-01 DIAGNOSIS — I35.0 NONRHEUMATIC AORTIC VALVE STENOSIS: ICD-10-CM

## 2021-01-01 DIAGNOSIS — D63.1 ANEMIA DUE TO STAGE 3 CHRONIC KIDNEY DISEASE TREATED WITH ERYTHROPOIETIN: ICD-10-CM

## 2021-01-01 DIAGNOSIS — D64.9 ANEMIA, UNSPECIFIED: ICD-10-CM

## 2021-01-01 DIAGNOSIS — I82.4Y2 DEEP VEIN THROMBOSIS (DVT) OF PROXIMAL VEIN OF LEFT LOWER EXTREMITY, UNSPECIFIED CHRONICITY: ICD-10-CM

## 2021-01-01 DIAGNOSIS — M79.604 LEG PAIN, BILATERAL: ICD-10-CM

## 2021-01-01 DIAGNOSIS — M71.21 POPLITEAL SYNOVIAL CYST, RIGHT: ICD-10-CM

## 2021-01-01 DIAGNOSIS — I15.2 HYPERTENSION ASSOCIATED WITH DIABETES: ICD-10-CM

## 2021-01-01 DIAGNOSIS — D63.8 ANEMIA OF CHRONIC DISEASE: ICD-10-CM

## 2021-01-01 DIAGNOSIS — E03.9 HYPOTHYROIDISM, UNSPECIFIED TYPE: ICD-10-CM

## 2021-01-01 DIAGNOSIS — Z23 NEED FOR VACCINATION: Primary | ICD-10-CM

## 2021-01-01 DIAGNOSIS — I50.32 DIASTOLIC CHF, CHRONIC: ICD-10-CM

## 2021-01-01 DIAGNOSIS — E11.22 CONTROLLED TYPE 2 DIABETES MELLITUS WITH STAGE 4 CHRONIC KIDNEY DISEASE, WITHOUT LONG-TERM CURRENT USE OF INSULIN: Primary | ICD-10-CM

## 2021-01-01 DIAGNOSIS — E78.5 HYPERLIPIDEMIA ASSOCIATED WITH TYPE 2 DIABETES MELLITUS: Primary | ICD-10-CM

## 2021-01-01 DIAGNOSIS — D53.9 NUTRITIONAL ANEMIA, UNSPECIFIED: ICD-10-CM

## 2021-01-01 DIAGNOSIS — N18.30 CHRONIC KIDNEY DISEASE, STAGE III (MODERATE): Primary | ICD-10-CM

## 2021-01-01 DIAGNOSIS — R60.0 EDEMA OF BOTH LOWER EXTREMITIES: ICD-10-CM

## 2021-01-01 DIAGNOSIS — D50.0 IRON DEFICIENCY ANEMIA DUE TO CHRONIC BLOOD LOSS: ICD-10-CM

## 2021-01-01 DIAGNOSIS — Z86.718 PERSONAL HISTORY OF THROMBOEMBOLIC DISEASE: Chronic | ICD-10-CM

## 2021-01-01 DIAGNOSIS — N18.30 CHRONIC KIDNEY DISEASE, STAGE III (MODERATE): ICD-10-CM

## 2021-01-01 DIAGNOSIS — E78.5 HYPERLIPIDEMIA ASSOCIATED WITH TYPE 2 DIABETES MELLITUS: ICD-10-CM

## 2021-01-01 DIAGNOSIS — Z86.711 HISTORY OF PULMONARY EMBOLISM: ICD-10-CM

## 2021-01-01 DIAGNOSIS — N39.0 RECURRENT UTI (URINARY TRACT INFECTION): ICD-10-CM

## 2021-01-01 DIAGNOSIS — I34.2 NONRHEUMATIC MITRAL VALVE STENOSIS: ICD-10-CM

## 2021-01-01 DIAGNOSIS — N18.4 CONTROLLED TYPE 2 DIABETES MELLITUS WITH STAGE 4 CHRONIC KIDNEY DISEASE, WITHOUT LONG-TERM CURRENT USE OF INSULIN: Primary | ICD-10-CM

## 2021-01-01 DIAGNOSIS — Z79.01 CHRONIC ANTICOAGULATION: Primary | ICD-10-CM

## 2021-01-01 DIAGNOSIS — D64.89 ANEMIA DUE TO MULTIPLE MECHANISMS: ICD-10-CM

## 2021-01-01 LAB
ALBUMIN SERPL BCP-MCNC: 2.7 G/DL (ref 3.5–5.2)
ALBUMIN SERPL BCP-MCNC: 2.8 G/DL (ref 3.5–5.2)
ALBUMIN SERPL BCP-MCNC: 3.1 G/DL (ref 3.5–5.2)
ALBUMIN SERPL BCP-MCNC: 3.3 G/DL (ref 3.5–5.2)
ALP SERPL-CCNC: 81 U/L (ref 55–135)
ALP SERPL-CCNC: 90 U/L (ref 55–135)
ALP SERPL-CCNC: 95 U/L (ref 55–135)
ALT SERPL W/O P-5'-P-CCNC: 13 U/L (ref 10–44)
ALT SERPL W/O P-5'-P-CCNC: 17 U/L (ref 10–44)
ALT SERPL W/O P-5'-P-CCNC: 31 U/L (ref 10–44)
ANION GAP SERPL CALC-SCNC: 10 MMOL/L (ref 8–16)
ANION GAP SERPL CALC-SCNC: 11 MMOL/L (ref 8–16)
ANION GAP SERPL CALC-SCNC: 12 MMOL/L (ref 8–16)
ANION GAP SERPL CALC-SCNC: 12 MMOL/L (ref 8–16)
ANION GAP SERPL CALC-SCNC: 8 MMOL/L (ref 8–16)
AST SERPL-CCNC: 20 U/L (ref 10–40)
AST SERPL-CCNC: 24 U/L (ref 10–40)
AST SERPL-CCNC: 39 U/L (ref 10–40)
BACTERIA #/AREA URNS HPF: NEGATIVE /HPF
BACTERIA UR CULT: ABNORMAL
BACTERIA UR CULT: NO GROWTH
BASOPHILS # BLD AUTO: 0.03 K/UL (ref 0–0.2)
BASOPHILS # BLD AUTO: 0.04 K/UL (ref 0–0.2)
BASOPHILS # BLD AUTO: 0.05 K/UL (ref 0–0.2)
BASOPHILS NFR BLD: 0.4 % (ref 0–1.9)
BASOPHILS NFR BLD: 0.5 % (ref 0–1.9)
BASOPHILS NFR BLD: 0.6 % (ref 0–1.9)
BASOPHILS NFR BLD: 0.6 % (ref 0–1.9)
BASOPHILS NFR BLD: 0.7 % (ref 0–1.9)
BILIRUB SERPL-MCNC: 0.2 MG/DL (ref 0.1–1)
BILIRUB SERPL-MCNC: 0.3 MG/DL (ref 0.1–1)
BILIRUB SERPL-MCNC: 0.3 MG/DL (ref 0.1–1)
BILIRUB UR QL STRIP: NEGATIVE
BNP SERPL-MCNC: 323 PG/ML (ref 0–99)
BUN SERPL-MCNC: 24 MG/DL (ref 10–30)
BUN SERPL-MCNC: 25 MG/DL (ref 10–30)
BUN SERPL-MCNC: 26 MG/DL (ref 10–30)
BUN SERPL-MCNC: 34 MG/DL (ref 10–30)
BUN SERPL-MCNC: 44 MG/DL (ref 10–30)
CALCIUM SERPL-MCNC: 8.7 MG/DL (ref 8.7–10.5)
CALCIUM SERPL-MCNC: 9 MG/DL (ref 8.7–10.5)
CALCIUM SERPL-MCNC: 9 MG/DL (ref 8.7–10.5)
CALCIUM SERPL-MCNC: 9.5 MG/DL (ref 8.7–10.5)
CALCIUM SERPL-MCNC: 9.6 MG/DL (ref 8.7–10.5)
CHLORIDE SERPL-SCNC: 105 MMOL/L (ref 95–110)
CHLORIDE SERPL-SCNC: 105 MMOL/L (ref 95–110)
CHLORIDE SERPL-SCNC: 106 MMOL/L (ref 95–110)
CHLORIDE SERPL-SCNC: 108 MMOL/L (ref 95–110)
CHLORIDE SERPL-SCNC: 110 MMOL/L (ref 95–110)
CLARITY UR: CLEAR
CO2 SERPL-SCNC: 21 MMOL/L (ref 23–29)
CO2 SERPL-SCNC: 24 MMOL/L (ref 23–29)
CO2 SERPL-SCNC: 25 MMOL/L (ref 23–29)
COLOR UR: YELLOW
CREAT SERPL-MCNC: 1.4 MG/DL (ref 0.5–1.4)
CREAT SERPL-MCNC: 1.5 MG/DL (ref 0.5–1.4)
CREAT SERPL-MCNC: 1.6 MG/DL (ref 0.5–1.4)
CREAT SERPL-MCNC: 1.9 MG/DL (ref 0.5–1.4)
CREAT SERPL-MCNC: 2 MG/DL (ref 0.5–1.4)
DIFFERENTIAL METHOD: ABNORMAL
EOSINOPHIL # BLD AUTO: 0.2 K/UL (ref 0–0.5)
EOSINOPHIL # BLD AUTO: 0.4 K/UL (ref 0–0.5)
EOSINOPHIL # BLD AUTO: 0.8 K/UL (ref 0–0.5)
EOSINOPHIL NFR BLD: 12.2 % (ref 0–8)
EOSINOPHIL NFR BLD: 2.2 % (ref 0–8)
EOSINOPHIL NFR BLD: 4.9 % (ref 0–8)
EOSINOPHIL NFR BLD: 5.4 % (ref 0–8)
EOSINOPHIL NFR BLD: 6 % (ref 0–8)
ERYTHROCYTE [DISTWIDTH] IN BLOOD BY AUTOMATED COUNT: 13.7 % (ref 11.5–14.5)
ERYTHROCYTE [DISTWIDTH] IN BLOOD BY AUTOMATED COUNT: 13.8 % (ref 11.5–14.5)
ERYTHROCYTE [DISTWIDTH] IN BLOOD BY AUTOMATED COUNT: 13.9 % (ref 11.5–14.5)
ERYTHROCYTE [DISTWIDTH] IN BLOOD BY AUTOMATED COUNT: 14 % (ref 11.5–14.5)
ERYTHROCYTE [DISTWIDTH] IN BLOOD BY AUTOMATED COUNT: 14.1 % (ref 11.5–14.5)
EST. GFR  (AFRICAN AMERICAN): 24.6 ML/MIN/1.73 M^2
EST. GFR  (AFRICAN AMERICAN): 26.2 ML/MIN/1.73 M^2
EST. GFR  (AFRICAN AMERICAN): 32.2 ML/MIN/1.73 M^2
EST. GFR  (AFRICAN AMERICAN): 34.9 ML/MIN/1.73 M^2
EST. GFR  (AFRICAN AMERICAN): 37.9 ML/MIN/1.73 M^2
EST. GFR  (NON AFRICAN AMERICAN): 21.4 ML/MIN/1.73 M^2
EST. GFR  (NON AFRICAN AMERICAN): 22.7 ML/MIN/1.73 M^2
EST. GFR  (NON AFRICAN AMERICAN): 28 ML/MIN/1.73 M^2
EST. GFR  (NON AFRICAN AMERICAN): 30.2 ML/MIN/1.73 M^2
EST. GFR  (NON AFRICAN AMERICAN): 32.9 ML/MIN/1.73 M^2
FERRITIN SERPL-MCNC: 589 NG/ML (ref 20–300)
FERRITIN SERPL-MCNC: 675 NG/ML (ref 20–300)
FERRITIN SERPL-MCNC: 715 NG/ML (ref 20–300)
FERRITIN SERPL-MCNC: 738 NG/ML (ref 20–300)
FOLATE SERPL-MCNC: 35 NG/ML (ref 4–24)
GLUCOSE SERPL-MCNC: 61 MG/DL (ref 70–110)
GLUCOSE SERPL-MCNC: 71 MG/DL (ref 70–110)
GLUCOSE SERPL-MCNC: 71 MG/DL (ref 70–110)
GLUCOSE SERPL-MCNC: 88 MG/DL (ref 70–110)
GLUCOSE SERPL-MCNC: 99 MG/DL (ref 70–110)
GLUCOSE UR QL STRIP: NEGATIVE
HCT VFR BLD AUTO: 30.6 % (ref 37–48.5)
HCT VFR BLD AUTO: 31.4 % (ref 37–48.5)
HCT VFR BLD AUTO: 31.9 % (ref 37–48.5)
HCT VFR BLD AUTO: 33.3 % (ref 37–48.5)
HCT VFR BLD AUTO: 35.3 % (ref 37–48.5)
HGB BLD-MCNC: 10.1 G/DL (ref 12–16)
HGB BLD-MCNC: 10.2 G/DL (ref 12–16)
HGB BLD-MCNC: 10.3 G/DL (ref 12–16)
HGB BLD-MCNC: 11.4 G/DL (ref 12–16)
HGB BLD-MCNC: 9.8 G/DL (ref 12–16)
HGB UR QL STRIP: ABNORMAL
HYALINE CASTS #/AREA URNS LPF: 47 /LPF
IMM GRANULOCYTES # BLD AUTO: 0.02 K/UL (ref 0–0.04)
IMM GRANULOCYTES # BLD AUTO: 0.02 K/UL (ref 0–0.04)
IMM GRANULOCYTES # BLD AUTO: 0.03 K/UL (ref 0–0.04)
IMM GRANULOCYTES # BLD AUTO: 0.03 K/UL (ref 0–0.04)
IMM GRANULOCYTES # BLD AUTO: 0.05 K/UL (ref 0–0.04)
IMM GRANULOCYTES NFR BLD AUTO: 0.3 % (ref 0–0.5)
IMM GRANULOCYTES NFR BLD AUTO: 0.3 % (ref 0–0.5)
IMM GRANULOCYTES NFR BLD AUTO: 0.4 % (ref 0–0.5)
IMM GRANULOCYTES NFR BLD AUTO: 0.4 % (ref 0–0.5)
IMM GRANULOCYTES NFR BLD AUTO: 0.7 % (ref 0–0.5)
IRON SERPL-MCNC: 19 UG/DL (ref 30–160)
IRON SERPL-MCNC: 39 UG/DL (ref 30–160)
IRON SERPL-MCNC: 44 UG/DL (ref 30–160)
IRON SERPL-MCNC: 53 UG/DL (ref 30–160)
KETONES UR QL STRIP: NEGATIVE
LEUKOCYTE ESTERASE UR QL STRIP: NEGATIVE
LYMPHOCYTES # BLD AUTO: 1.1 K/UL (ref 1–4.8)
LYMPHOCYTES # BLD AUTO: 1.4 K/UL (ref 1–4.8)
LYMPHOCYTES # BLD AUTO: 1.6 K/UL (ref 1–4.8)
LYMPHOCYTES # BLD AUTO: 1.6 K/UL (ref 1–4.8)
LYMPHOCYTES # BLD AUTO: 1.7 K/UL (ref 1–4.8)
LYMPHOCYTES NFR BLD: 14.6 % (ref 18–48)
LYMPHOCYTES NFR BLD: 18.3 % (ref 18–48)
LYMPHOCYTES NFR BLD: 22.9 % (ref 18–48)
LYMPHOCYTES NFR BLD: 25.1 % (ref 18–48)
LYMPHOCYTES NFR BLD: 25.2 % (ref 18–48)
MCH RBC QN AUTO: 31.4 PG (ref 27–31)
MCH RBC QN AUTO: 32.3 PG (ref 27–31)
MCH RBC QN AUTO: 32.7 PG (ref 27–31)
MCHC RBC AUTO-ENTMCNC: 30.9 G/DL (ref 32–36)
MCHC RBC AUTO-ENTMCNC: 32 G/DL (ref 32–36)
MCHC RBC AUTO-ENTMCNC: 32 G/DL (ref 32–36)
MCHC RBC AUTO-ENTMCNC: 32.2 G/DL (ref 32–36)
MCHC RBC AUTO-ENTMCNC: 32.3 G/DL (ref 32–36)
MCV RBC AUTO: 100 FL (ref 82–98)
MCV RBC AUTO: 101 FL (ref 82–98)
MCV RBC AUTO: 102 FL (ref 82–98)
MCV RBC AUTO: 98 FL (ref 82–98)
MCV RBC AUTO: 98 FL (ref 82–98)
MICROSCOPIC COMMENT: ABNORMAL
MONOCYTES # BLD AUTO: 0.6 K/UL (ref 0.3–1)
MONOCYTES # BLD AUTO: 0.6 K/UL (ref 0.3–1)
MONOCYTES # BLD AUTO: 0.7 K/UL (ref 0.3–1)
MONOCYTES # BLD AUTO: 0.7 K/UL (ref 0.3–1)
MONOCYTES # BLD AUTO: 0.8 K/UL (ref 0.3–1)
MONOCYTES NFR BLD: 10.4 % (ref 4–15)
MONOCYTES NFR BLD: 10.6 % (ref 4–15)
MONOCYTES NFR BLD: 9 % (ref 4–15)
MONOCYTES NFR BLD: 9.3 % (ref 4–15)
MONOCYTES NFR BLD: 9.4 % (ref 4–15)
NEUTROPHILS # BLD AUTO: 3.6 K/UL (ref 1.8–7.7)
NEUTROPHILS # BLD AUTO: 3.6 K/UL (ref 1.8–7.7)
NEUTROPHILS # BLD AUTO: 4 K/UL (ref 1.8–7.7)
NEUTROPHILS # BLD AUTO: 5.2 K/UL (ref 1.8–7.7)
NEUTROPHILS # BLD AUTO: 5.6 K/UL (ref 1.8–7.7)
NEUTROPHILS NFR BLD: 53.2 % (ref 38–73)
NEUTROPHILS NFR BLD: 58.6 % (ref 38–73)
NEUTROPHILS NFR BLD: 59.5 % (ref 38–73)
NEUTROPHILS NFR BLD: 65.5 % (ref 38–73)
NEUTROPHILS NFR BLD: 72.8 % (ref 38–73)
NITRITE UR QL STRIP: NEGATIVE
NRBC BLD-RTO: 0 /100 WBC
PH UR STRIP: 6 [PH] (ref 5–8)
PHOSPHATE SERPL-MCNC: 4.2 MG/DL (ref 2.7–4.5)
PLATELET # BLD AUTO: 198 K/UL (ref 150–450)
PLATELET # BLD AUTO: 208 K/UL (ref 150–450)
PLATELET # BLD AUTO: 216 K/UL (ref 150–450)
PLATELET # BLD AUTO: 217 K/UL (ref 150–450)
PLATELET # BLD AUTO: 221 K/UL (ref 150–450)
PMV BLD AUTO: 11 FL (ref 9.2–12.9)
PMV BLD AUTO: 11.1 FL (ref 9.2–12.9)
PMV BLD AUTO: 11.5 FL (ref 9.2–12.9)
POTASSIUM SERPL-SCNC: 3.7 MMOL/L (ref 3.5–5.1)
POTASSIUM SERPL-SCNC: 3.9 MMOL/L (ref 3.5–5.1)
POTASSIUM SERPL-SCNC: 4.3 MMOL/L (ref 3.5–5.1)
POTASSIUM SERPL-SCNC: 4.3 MMOL/L (ref 3.5–5.1)
POTASSIUM SERPL-SCNC: 4.6 MMOL/L (ref 3.5–5.1)
PROT SERPL-MCNC: 6.3 G/DL (ref 6–8.4)
PROT SERPL-MCNC: 6.3 G/DL (ref 6–8.4)
PROT SERPL-MCNC: 6.9 G/DL (ref 6–8.4)
PROT UR QL STRIP: ABNORMAL
RBC # BLD AUTO: 3.12 M/UL (ref 4–5.4)
RBC # BLD AUTO: 3.13 M/UL (ref 4–5.4)
RBC # BLD AUTO: 3.25 M/UL (ref 4–5.4)
RBC # BLD AUTO: 3.28 M/UL (ref 4–5.4)
RBC # BLD AUTO: 3.49 M/UL (ref 4–5.4)
RBC #/AREA URNS HPF: 2 /HPF (ref 0–4)
SATURATED IRON: 18 % (ref 20–50)
SATURATED IRON: 18 % (ref 20–50)
SATURATED IRON: 20 % (ref 20–50)
SATURATED IRON: 9 % (ref 20–50)
SODIUM SERPL-SCNC: 137 MMOL/L (ref 136–145)
SODIUM SERPL-SCNC: 138 MMOL/L (ref 136–145)
SODIUM SERPL-SCNC: 141 MMOL/L (ref 136–145)
SODIUM SERPL-SCNC: 141 MMOL/L (ref 136–145)
SODIUM SERPL-SCNC: 142 MMOL/L (ref 136–145)
SP GR UR STRIP: 1.02 (ref 1–1.03)
SQUAMOUS #/AREA URNS HPF: 5 /HPF
TOTAL IRON BINDING CAPACITY: 218 UG/DL (ref 250–450)
TOTAL IRON BINDING CAPACITY: 221 UG/DL (ref 250–450)
TOTAL IRON BINDING CAPACITY: 244 UG/DL (ref 250–450)
TOTAL IRON BINDING CAPACITY: 269 UG/DL (ref 250–450)
TRANSFERRIN SERPL-MCNC: 147 MG/DL (ref 200–375)
TRANSFERRIN SERPL-MCNC: 149 MG/DL (ref 200–375)
TRANSFERRIN SERPL-MCNC: 165 MG/DL (ref 200–375)
TRANSFERRIN SERPL-MCNC: 182 MG/DL (ref 200–375)
URATE SERPL-MCNC: 4.6 MG/DL (ref 2.4–5.7)
URN SPEC COLLECT METH UR: ABNORMAL
UROBILINOGEN UR STRIP-ACNC: NEGATIVE EU/DL
VIT B12 SERPL-MCNC: 649 PG/ML (ref 210–950)
WBC # BLD AUTO: 6.17 K/UL (ref 3.9–12.7)
WBC # BLD AUTO: 6.66 K/UL (ref 3.9–12.7)
WBC # BLD AUTO: 6.81 K/UL (ref 3.9–12.7)
WBC # BLD AUTO: 7.65 K/UL (ref 3.9–12.7)
WBC # BLD AUTO: 7.89 K/UL (ref 3.9–12.7)
WBC #/AREA URNS HPF: 5 /HPF (ref 0–5)

## 2021-01-01 PROCEDURE — 1157F PR ADVANCE CARE PLAN OR EQUIV PRESENT IN MEDICAL RECORD: ICD-10-PCS | Mod: CPTII,S$GLB,, | Performed by: INTERNAL MEDICINE

## 2021-01-01 PROCEDURE — 85025 COMPLETE CBC W/AUTO DIFF WBC: CPT | Performed by: INTERNAL MEDICINE

## 2021-01-01 PROCEDURE — 99214 OFFICE O/P EST MOD 30 MIN: CPT | Mod: S$GLB,,, | Performed by: INTERNAL MEDICINE

## 2021-01-01 PROCEDURE — 3288F FALL RISK ASSESSMENT DOCD: CPT | Mod: CPTII,S$GLB,, | Performed by: INTERNAL MEDICINE

## 2021-01-01 PROCEDURE — 1101F PR PT FALLS ASSESS DOC 0-1 FALLS W/OUT INJ PAST YR: ICD-10-PCS | Mod: CPTII,S$GLB,,

## 2021-01-01 PROCEDURE — 36415 COLL VENOUS BLD VENIPUNCTURE: CPT | Mod: PO | Performed by: INTERNAL MEDICINE

## 2021-01-01 PROCEDURE — 1160F RVW MEDS BY RX/DR IN RCRD: CPT | Mod: CPTII,S$GLB,, | Performed by: INTERNAL MEDICINE

## 2021-01-01 PROCEDURE — 93970 EXTREMITY STUDY: CPT | Mod: TC

## 2021-01-01 PROCEDURE — 87186 SC STD MICRODIL/AGAR DIL: CPT | Performed by: NURSE PRACTITIONER

## 2021-01-01 PROCEDURE — 80053 COMPREHEN METABOLIC PANEL: CPT | Performed by: INTERNAL MEDICINE

## 2021-01-01 PROCEDURE — 99213 OFFICE O/P EST LOW 20 MIN: CPT | Mod: S$GLB,,, | Performed by: INTERNAL MEDICINE

## 2021-01-01 PROCEDURE — 1126F PR PAIN SEVERITY QUANTIFIED, NO PAIN PRESENT: ICD-10-PCS | Mod: CPTII,S$GLB,, | Performed by: INTERNAL MEDICINE

## 2021-01-01 PROCEDURE — 1157F ADVNC CARE PLAN IN RCRD: CPT | Mod: CPTII,S$GLB,, | Performed by: NURSE PRACTITIONER

## 2021-01-01 PROCEDURE — 63600175 PHARM REV CODE 636 W HCPCS: Mod: JG | Performed by: INTERNAL MEDICINE

## 2021-01-01 PROCEDURE — 82728 ASSAY OF FERRITIN: CPT | Performed by: INTERNAL MEDICINE

## 2021-01-01 PROCEDURE — 99214 OFFICE O/P EST MOD 30 MIN: CPT | Mod: S$GLB,,, | Performed by: NURSE PRACTITIONER

## 2021-01-01 PROCEDURE — 1101F PR PT FALLS ASSESS DOC 0-1 FALLS W/OUT INJ PAST YR: ICD-10-PCS | Mod: CPTII,S$GLB,, | Performed by: INTERNAL MEDICINE

## 2021-01-01 PROCEDURE — 1160F PR REVIEW ALL MEDS BY PRESCRIBER/CLIN PHARMACIST DOCUMENTED: ICD-10-PCS | Mod: CPTII,S$GLB,, | Performed by: INTERNAL MEDICINE

## 2021-01-01 PROCEDURE — 1157F PR ADVANCE CARE PLAN OR EQUIV PRESENT IN MEDICAL RECORD: ICD-10-PCS | Mod: S$GLB,,, | Performed by: INTERNAL MEDICINE

## 2021-01-01 PROCEDURE — 87088 URINE BACTERIA CULTURE: CPT | Performed by: NURSE PRACTITIONER

## 2021-01-01 PROCEDURE — 84466 ASSAY OF TRANSFERRIN: CPT | Performed by: INTERNAL MEDICINE

## 2021-01-01 PROCEDURE — 3288F PR FALLS RISK ASSESSMENT DOCUMENTED: ICD-10-PCS | Mod: CPTII,S$GLB,, | Performed by: INTERNAL MEDICINE

## 2021-01-01 PROCEDURE — 99214 OFFICE O/P EST MOD 30 MIN: CPT | Mod: S$GLB,,,

## 2021-01-01 PROCEDURE — 99999 PR PBB SHADOW E&M-EST. PATIENT-LVL V: ICD-10-PCS | Mod: PBBFAC,,, | Performed by: NURSE PRACTITIONER

## 2021-01-01 PROCEDURE — 80048 BASIC METABOLIC PNL TOTAL CA: CPT | Performed by: FAMILY MEDICINE

## 2021-01-01 PROCEDURE — 1157F PR ADVANCE CARE PLAN OR EQUIV PRESENT IN MEDICAL RECORD: ICD-10-PCS | Mod: CPTII,S$GLB,, | Performed by: NURSE PRACTITIONER

## 2021-01-01 PROCEDURE — 0003A COVID-19, MRNA, LNP-S, PF, 30 MCG/0.3 ML DOSE VACCINE: CPT | Mod: S$GLB,,, | Performed by: FAMILY MEDICINE

## 2021-01-01 PROCEDURE — 99214 PR OFFICE/OUTPT VISIT, EST, LEVL IV, 30-39 MIN: ICD-10-PCS | Mod: S$GLB,,, | Performed by: INTERNAL MEDICINE

## 2021-01-01 PROCEDURE — 93000 EKG 12-LEAD: ICD-10-PCS | Mod: S$GLB,,, | Performed by: INTERNAL MEDICINE

## 2021-01-01 PROCEDURE — 82607 VITAMIN B-12: CPT | Performed by: INTERNAL MEDICINE

## 2021-01-01 PROCEDURE — 1157F ADVNC CARE PLAN IN RCRD: CPT | Mod: S$GLB,,, | Performed by: INTERNAL MEDICINE

## 2021-01-01 PROCEDURE — 96372 THER/PROPH/DIAG INJ SC/IM: CPT

## 2021-01-01 PROCEDURE — 3288F PR FALLS RISK ASSESSMENT DOCUMENTED: ICD-10-PCS | Mod: CPTII,S$GLB,,

## 2021-01-01 PROCEDURE — 99214 PR OFFICE/OUTPT VISIT, EST, LEVL IV, 30-39 MIN: ICD-10-PCS | Mod: S$GLB,,,

## 2021-01-01 PROCEDURE — 99999 PR PBB SHADOW E&M-EST. PATIENT-LVL V: CPT | Mod: PBBFAC,,, | Performed by: NURSE PRACTITIONER

## 2021-01-01 PROCEDURE — 99214 PR OFFICE/OUTPT VISIT, EST, LEVL IV, 30-39 MIN: ICD-10-PCS | Mod: S$GLB,,, | Performed by: NURSE PRACTITIONER

## 2021-01-01 PROCEDURE — 82746 ASSAY OF FOLIC ACID SERUM: CPT | Performed by: INTERNAL MEDICINE

## 2021-01-01 PROCEDURE — 1126F PR PAIN SEVERITY QUANTIFIED, NO PAIN PRESENT: ICD-10-PCS | Mod: CPTII,S$GLB,,

## 2021-01-01 PROCEDURE — 1157F PR ADVANCE CARE PLAN OR EQUIV PRESENT IN MEDICAL RECORD: ICD-10-PCS | Mod: CPTII,S$GLB,,

## 2021-01-01 PROCEDURE — 87086 URINE CULTURE/COLONY COUNT: CPT | Performed by: NURSE PRACTITIONER

## 2021-01-01 PROCEDURE — 1160F RVW MEDS BY RX/DR IN RCRD: CPT | Mod: CPTII,S$GLB,,

## 2021-01-01 PROCEDURE — 91300 COVID-19, MRNA, LNP-S, PF, 30 MCG/0.3 ML DOSE VACCINE: CPT | Mod: S$GLB,,, | Performed by: FAMILY MEDICINE

## 2021-01-01 PROCEDURE — 84550 ASSAY OF BLOOD/URIC ACID: CPT | Performed by: INTERNAL MEDICINE

## 2021-01-01 PROCEDURE — 1159F PR MEDICATION LIST DOCUMENTED IN MEDICAL RECORD: ICD-10-PCS | Mod: CPTII,S$GLB,, | Performed by: INTERNAL MEDICINE

## 2021-01-01 PROCEDURE — 93000 ELECTROCARDIOGRAM COMPLETE: CPT | Mod: S$GLB,,, | Performed by: INTERNAL MEDICINE

## 2021-01-01 PROCEDURE — 1160F PR REVIEW ALL MEDS BY PRESCRIBER/CLIN PHARMACIST DOCUMENTED: ICD-10-PCS | Mod: CPTII,S$GLB,,

## 2021-01-01 PROCEDURE — 83880 ASSAY OF NATRIURETIC PEPTIDE: CPT | Performed by: FAMILY MEDICINE

## 2021-01-01 PROCEDURE — 1159F PR MEDICATION LIST DOCUMENTED IN MEDICAL RECORD: ICD-10-PCS | Mod: CPTII,S$GLB,,

## 2021-01-01 PROCEDURE — 91300 COVID-19, MRNA, LNP-S, PF, 30 MCG/0.3 ML DOSE VACCINE: ICD-10-PCS | Mod: S$GLB,,, | Performed by: FAMILY MEDICINE

## 2021-01-01 PROCEDURE — 81001 URINALYSIS AUTO W/SCOPE: CPT | Performed by: NURSE PRACTITIONER

## 2021-01-01 PROCEDURE — 1101F PT FALLS ASSESS-DOCD LE1/YR: CPT | Mod: CPTII,S$GLB,,

## 2021-01-01 PROCEDURE — 3288F FALL RISK ASSESSMENT DOCD: CPT | Mod: CPTII,S$GLB,,

## 2021-01-01 PROCEDURE — 99213 PR OFFICE/OUTPT VISIT, EST, LEVL III, 20-29 MIN: ICD-10-PCS | Mod: S$GLB,,, | Performed by: INTERNAL MEDICINE

## 2021-01-01 PROCEDURE — 1126F AMNT PAIN NOTED NONE PRSNT: CPT | Mod: CPTII,S$GLB,,

## 2021-01-01 PROCEDURE — 1159F MED LIST DOCD IN RCRD: CPT | Mod: CPTII,S$GLB,,

## 2021-01-01 PROCEDURE — 1126F AMNT PAIN NOTED NONE PRSNT: CPT | Mod: CPTII,S$GLB,, | Performed by: INTERNAL MEDICINE

## 2021-01-01 PROCEDURE — 1157F ADVNC CARE PLAN IN RCRD: CPT | Mod: CPTII,S$GLB,, | Performed by: INTERNAL MEDICINE

## 2021-01-01 PROCEDURE — 80069 RENAL FUNCTION PANEL: CPT | Performed by: INTERNAL MEDICINE

## 2021-01-01 PROCEDURE — 0003A COVID-19, MRNA, LNP-S, PF, 30 MCG/0.3 ML DOSE VACCINE: ICD-10-PCS | Mod: S$GLB,,, | Performed by: FAMILY MEDICINE

## 2021-01-01 PROCEDURE — 1157F ADVNC CARE PLAN IN RCRD: CPT | Mod: CPTII,S$GLB,,

## 2021-01-01 PROCEDURE — 36415 COLL VENOUS BLD VENIPUNCTURE: CPT | Performed by: INTERNAL MEDICINE

## 2021-01-01 PROCEDURE — 87077 CULTURE AEROBIC IDENTIFY: CPT | Performed by: NURSE PRACTITIONER

## 2021-01-01 PROCEDURE — 1159F MED LIST DOCD IN RCRD: CPT | Mod: CPTII,S$GLB,, | Performed by: INTERNAL MEDICINE

## 2021-01-01 PROCEDURE — G0179 MD RECERTIFICATION HHA PT: HCPCS | Mod: ,,, | Performed by: FAMILY MEDICINE

## 2021-01-01 PROCEDURE — G0179 PR HOME HEALTH MD RECERTIFICATION: ICD-10-PCS | Mod: ,,, | Performed by: FAMILY MEDICINE

## 2021-01-01 PROCEDURE — 1101F PT FALLS ASSESS-DOCD LE1/YR: CPT | Mod: CPTII,S$GLB,, | Performed by: INTERNAL MEDICINE

## 2021-01-01 RX ORDER — DILTIAZEM HYDROCHLORIDE 180 MG/1
180 CAPSULE, EXTENDED RELEASE ORAL DAILY
Qty: 30 CAPSULE | Refills: 11 | Status: SHIPPED | OUTPATIENT
Start: 2021-01-01 | End: 2021-01-01 | Stop reason: ALTCHOICE

## 2021-01-01 RX ORDER — LOSARTAN POTASSIUM 25 MG/1
25 TABLET ORAL DAILY
Qty: 90 TABLET | Refills: 3 | Status: SHIPPED | OUTPATIENT
Start: 2021-01-01 | End: 2022-01-01 | Stop reason: SDUPTHER

## 2021-01-01 RX ORDER — ROSUVASTATIN CALCIUM 20 MG/1
20 TABLET, COATED ORAL DAILY
Qty: 90 TABLET | Refills: 3 | Status: SHIPPED | OUTPATIENT
Start: 2021-01-01 | End: 2022-01-01 | Stop reason: SDUPTHER

## 2021-01-01 RX ORDER — DILTIAZEM HYDROCHLORIDE 180 MG/1
180 CAPSULE, COATED, EXTENDED RELEASE ORAL DAILY
Qty: 90 CAPSULE | Refills: 3 | Status: ON HOLD | OUTPATIENT
Start: 2021-01-01 | End: 2022-01-01 | Stop reason: SDUPTHER

## 2021-01-01 RX ORDER — CEFUROXIME AXETIL 500 MG/1
500 TABLET ORAL 2 TIMES DAILY
Qty: 28 TABLET | Refills: 0 | Status: SHIPPED | OUTPATIENT
Start: 2021-01-01 | End: 2021-01-01

## 2021-01-01 RX ADMIN — EPOETIN ALFA-EPBX 3000 UNITS: 3000 INJECTION, SOLUTION INTRAVENOUS; SUBCUTANEOUS at 02:12

## 2021-01-05 ENCOUNTER — OFFICE VISIT (OUTPATIENT)
Dept: FAMILY MEDICINE | Facility: CLINIC | Age: 86
End: 2021-01-05
Payer: MEDICARE

## 2021-01-05 VITALS
SYSTOLIC BLOOD PRESSURE: 130 MMHG | DIASTOLIC BLOOD PRESSURE: 58 MMHG | BODY MASS INDEX: 21.11 KG/M2 | HEART RATE: 70 BPM | HEIGHT: 66 IN | WEIGHT: 131.38 LBS | OXYGEN SATURATION: 97 % | TEMPERATURE: 98 F | RESPIRATION RATE: 12 BRPM

## 2021-01-05 DIAGNOSIS — I35.0 NONRHEUMATIC AORTIC VALVE STENOSIS: ICD-10-CM

## 2021-01-05 DIAGNOSIS — E11.22 TYPE 2 DIABETES MELLITUS WITH STAGE 4 CHRONIC KIDNEY DISEASE, WITHOUT LONG-TERM CURRENT USE OF INSULIN: Primary | ICD-10-CM

## 2021-01-05 DIAGNOSIS — K92.2 GASTROINTESTINAL HEMORRHAGE, UNSPECIFIED GASTROINTESTINAL HEMORRHAGE TYPE: ICD-10-CM

## 2021-01-05 DIAGNOSIS — I15.2 HYPERTENSION ASSOCIATED WITH DIABETES: ICD-10-CM

## 2021-01-05 DIAGNOSIS — D50.0 IRON DEFICIENCY ANEMIA DUE TO CHRONIC BLOOD LOSS: ICD-10-CM

## 2021-01-05 DIAGNOSIS — E11.9 DIABETIC EYE EXAM: ICD-10-CM

## 2021-01-05 DIAGNOSIS — Z01.00 DIABETIC EYE EXAM: ICD-10-CM

## 2021-01-05 DIAGNOSIS — E11.59 HYPERTENSION ASSOCIATED WITH DIABETES: ICD-10-CM

## 2021-01-05 DIAGNOSIS — N18.4 TYPE 2 DIABETES MELLITUS WITH STAGE 4 CHRONIC KIDNEY DISEASE, WITHOUT LONG-TERM CURRENT USE OF INSULIN: Primary | ICD-10-CM

## 2021-01-05 DIAGNOSIS — E03.9 HYPOTHYROIDISM, UNSPECIFIED TYPE: ICD-10-CM

## 2021-01-05 DIAGNOSIS — J44.89 ASTHMA-COPD OVERLAP SYNDROME: ICD-10-CM

## 2021-01-05 PROCEDURE — 1125F AMNT PAIN NOTED PAIN PRSNT: CPT | Mod: S$GLB,,, | Performed by: FAMILY MEDICINE

## 2021-01-05 PROCEDURE — 1159F MED LIST DOCD IN RCRD: CPT | Mod: S$GLB,,, | Performed by: FAMILY MEDICINE

## 2021-01-05 PROCEDURE — 3288F PR FALLS RISK ASSESSMENT DOCUMENTED: ICD-10-PCS | Mod: CPTII,S$GLB,, | Performed by: FAMILY MEDICINE

## 2021-01-05 PROCEDURE — 1101F PT FALLS ASSESS-DOCD LE1/YR: CPT | Mod: CPTII,S$GLB,, | Performed by: FAMILY MEDICINE

## 2021-01-05 PROCEDURE — 99999 PR PBB SHADOW E&M-EST. PATIENT-LVL IV: CPT | Mod: PBBFAC,,, | Performed by: FAMILY MEDICINE

## 2021-01-05 PROCEDURE — 99214 OFFICE O/P EST MOD 30 MIN: CPT | Mod: S$GLB,,, | Performed by: FAMILY MEDICINE

## 2021-01-05 PROCEDURE — 3288F FALL RISK ASSESSMENT DOCD: CPT | Mod: CPTII,S$GLB,, | Performed by: FAMILY MEDICINE

## 2021-01-05 PROCEDURE — 1101F PR PT FALLS ASSESS DOC 0-1 FALLS W/OUT INJ PAST YR: ICD-10-PCS | Mod: CPTII,S$GLB,, | Performed by: FAMILY MEDICINE

## 2021-01-05 PROCEDURE — 1125F PR PAIN SEVERITY QUANTIFIED, PAIN PRESENT: ICD-10-PCS | Mod: S$GLB,,, | Performed by: FAMILY MEDICINE

## 2021-01-05 PROCEDURE — 99214 PR OFFICE/OUTPT VISIT, EST, LEVL IV, 30-39 MIN: ICD-10-PCS | Mod: S$GLB,,, | Performed by: FAMILY MEDICINE

## 2021-01-05 PROCEDURE — 99999 PR PBB SHADOW E&M-EST. PATIENT-LVL IV: ICD-10-PCS | Mod: PBBFAC,,, | Performed by: FAMILY MEDICINE

## 2021-01-05 PROCEDURE — 1159F PR MEDICATION LIST DOCUMENTED IN MEDICAL RECORD: ICD-10-PCS | Mod: S$GLB,,, | Performed by: FAMILY MEDICINE

## 2021-01-06 ENCOUNTER — PATIENT MESSAGE (OUTPATIENT)
Dept: RHEUMATOLOGY | Facility: CLINIC | Age: 86
End: 2021-01-06

## 2021-01-07 ENCOUNTER — TELEPHONE (OUTPATIENT)
Dept: FAMILY MEDICINE | Facility: CLINIC | Age: 86
End: 2021-01-07

## 2021-01-13 ENCOUNTER — LAB VISIT (OUTPATIENT)
Dept: LAB | Facility: HOSPITAL | Age: 86
End: 2021-01-13
Attending: INTERNAL MEDICINE
Payer: MEDICARE

## 2021-01-13 DIAGNOSIS — K55.20 ANGIODYSPLASIA OF INTESTINAL TRACT: Primary | ICD-10-CM

## 2021-01-13 LAB
BASOPHILS # BLD AUTO: 0.03 K/UL (ref 0–0.2)
BASOPHILS NFR BLD: 0.5 % (ref 0–1.9)
DIFFERENTIAL METHOD: ABNORMAL
EOSINOPHIL # BLD AUTO: 0.2 K/UL (ref 0–0.5)
EOSINOPHIL NFR BLD: 2.9 % (ref 0–8)
ERYTHROCYTE [DISTWIDTH] IN BLOOD BY AUTOMATED COUNT: 13.9 % (ref 11.5–14.5)
HCT VFR BLD AUTO: 34.1 % (ref 37–48.5)
HGB BLD-MCNC: 10.4 G/DL (ref 12–16)
IMM GRANULOCYTES # BLD AUTO: 0.03 K/UL (ref 0–0.04)
IMM GRANULOCYTES NFR BLD AUTO: 0.5 % (ref 0–0.5)
LYMPHOCYTES # BLD AUTO: 1 K/UL (ref 1–4.8)
LYMPHOCYTES NFR BLD: 16.4 % (ref 18–48)
MCH RBC QN AUTO: 31.3 PG (ref 27–31)
MCHC RBC AUTO-ENTMCNC: 30.5 G/DL (ref 32–36)
MCV RBC AUTO: 103 FL (ref 82–98)
MONOCYTES # BLD AUTO: 0.6 K/UL (ref 0.3–1)
MONOCYTES NFR BLD: 8.8 % (ref 4–15)
NEUTROPHILS # BLD AUTO: 4.4 K/UL (ref 1.8–7.7)
NEUTROPHILS NFR BLD: 70.9 % (ref 38–73)
NRBC BLD-RTO: 0 /100 WBC
PLATELET # BLD AUTO: 173 K/UL (ref 150–350)
PMV BLD AUTO: 11.6 FL (ref 9.2–12.9)
RBC # BLD AUTO: 3.32 M/UL (ref 4–5.4)
WBC # BLD AUTO: 6.23 K/UL (ref 3.9–12.7)

## 2021-01-13 PROCEDURE — 36415 COLL VENOUS BLD VENIPUNCTURE: CPT | Mod: PO

## 2021-01-13 PROCEDURE — 85025 COMPLETE CBC W/AUTO DIFF WBC: CPT | Mod: 91

## 2021-01-22 ENCOUNTER — PATIENT MESSAGE (OUTPATIENT)
Dept: ADMINISTRATIVE | Facility: OTHER | Age: 86
End: 2021-01-22

## 2021-01-23 ENCOUNTER — CLINICAL SUPPORT (OUTPATIENT)
Dept: CARDIOLOGY | Facility: CLINIC | Age: 86
End: 2021-01-23
Payer: MEDICARE

## 2021-01-23 DIAGNOSIS — Z95.818 PRESENCE OF OTHER CARDIAC IMPLANTS AND GRAFTS: ICD-10-CM

## 2021-01-23 PROCEDURE — 93298 CARDIAC DEVICE CHECK - REMOTE: ICD-10-PCS | Mod: S$GLB,,, | Performed by: INTERNAL MEDICINE

## 2021-01-23 PROCEDURE — 93298 REM INTERROG DEV EVAL SCRMS: CPT | Mod: S$GLB,,, | Performed by: INTERNAL MEDICINE

## 2021-01-24 ENCOUNTER — TELEPHONE (OUTPATIENT)
Dept: FAMILY MEDICINE | Facility: CLINIC | Age: 86
End: 2021-01-24

## 2021-01-24 DIAGNOSIS — Z86.718 HISTORY OF RECURRENT DEEP VEIN THROMBOSIS (DVT): ICD-10-CM

## 2021-01-24 DIAGNOSIS — Z86.711 HISTORY OF PULMONARY EMBOLISM: Primary | ICD-10-CM

## 2021-01-26 ENCOUNTER — TELEPHONE (OUTPATIENT)
Dept: FAMILY MEDICINE | Facility: CLINIC | Age: 86
End: 2021-01-26

## 2021-01-27 ENCOUNTER — LAB VISIT (OUTPATIENT)
Dept: LAB | Facility: HOSPITAL | Age: 86
End: 2021-01-27
Attending: INTERNAL MEDICINE
Payer: MEDICARE

## 2021-01-27 DIAGNOSIS — D50.9 IRON DEFICIENCY ANEMIA, UNSPECIFIED: Primary | ICD-10-CM

## 2021-01-27 LAB
BASOPHILS # BLD AUTO: 0.05 K/UL (ref 0–0.2)
BASOPHILS NFR BLD: 0.5 % (ref 0–1.9)
DIFFERENTIAL METHOD: ABNORMAL
EOSINOPHIL # BLD AUTO: 0.2 K/UL (ref 0–0.5)
EOSINOPHIL NFR BLD: 1.6 % (ref 0–8)
ERYTHROCYTE [DISTWIDTH] IN BLOOD BY AUTOMATED COUNT: 14.2 % (ref 11.5–14.5)
HCT VFR BLD AUTO: 31.4 % (ref 37–48.5)
HGB BLD-MCNC: 9.8 G/DL (ref 12–16)
IMM GRANULOCYTES # BLD AUTO: 0.1 K/UL (ref 0–0.04)
IMM GRANULOCYTES NFR BLD AUTO: 0.9 % (ref 0–0.5)
LYMPHOCYTES # BLD AUTO: 1.5 K/UL (ref 1–4.8)
LYMPHOCYTES NFR BLD: 14.1 % (ref 18–48)
MCH RBC QN AUTO: 32 PG (ref 27–31)
MCHC RBC AUTO-ENTMCNC: 31.2 G/DL (ref 32–36)
MCV RBC AUTO: 103 FL (ref 82–98)
MONOCYTES # BLD AUTO: 1.3 K/UL (ref 0.3–1)
MONOCYTES NFR BLD: 12.3 % (ref 4–15)
NEUTROPHILS # BLD AUTO: 7.6 K/UL (ref 1.8–7.7)
NEUTROPHILS NFR BLD: 70.6 % (ref 38–73)
NRBC BLD-RTO: 0 /100 WBC
PLATELET # BLD AUTO: 250 K/UL (ref 150–350)
PMV BLD AUTO: 11.3 FL (ref 9.2–12.9)
RBC # BLD AUTO: 3.06 M/UL (ref 4–5.4)
WBC # BLD AUTO: 10.74 K/UL (ref 3.9–12.7)

## 2021-01-27 PROCEDURE — 85025 COMPLETE CBC W/AUTO DIFF WBC: CPT | Mod: 91

## 2021-01-28 ENCOUNTER — TELEPHONE (OUTPATIENT)
Dept: CARDIOLOGY | Facility: CLINIC | Age: 86
End: 2021-01-28

## 2021-02-05 ENCOUNTER — PATIENT OUTREACH (OUTPATIENT)
Dept: ADMINISTRATIVE | Facility: OTHER | Age: 86
End: 2021-02-05

## 2021-02-09 ENCOUNTER — OFFICE VISIT (OUTPATIENT)
Dept: PULMONOLOGY | Facility: CLINIC | Age: 86
End: 2021-02-09
Payer: MEDICARE

## 2021-02-09 VITALS
BODY MASS INDEX: 21.45 KG/M2 | DIASTOLIC BLOOD PRESSURE: 74 MMHG | HEART RATE: 69 BPM | WEIGHT: 133.5 LBS | SYSTOLIC BLOOD PRESSURE: 168 MMHG | OXYGEN SATURATION: 98 % | HEIGHT: 66 IN

## 2021-02-09 DIAGNOSIS — J82.83 EOSINOPHILIC ASTHMA: ICD-10-CM

## 2021-02-09 DIAGNOSIS — J44.89 ASTHMA-COPD OVERLAP SYNDROME: Primary | ICD-10-CM

## 2021-02-09 DIAGNOSIS — J96.11 CHRONIC RESPIRATORY FAILURE WITH HYPOXIA: ICD-10-CM

## 2021-02-09 PROCEDURE — 99999 PR PBB SHADOW E&M-EST. PATIENT-LVL V: CPT | Mod: PBBFAC,,, | Performed by: INTERNAL MEDICINE

## 2021-02-09 PROCEDURE — 99214 OFFICE O/P EST MOD 30 MIN: CPT | Mod: S$GLB,,, | Performed by: INTERNAL MEDICINE

## 2021-02-09 PROCEDURE — 1126F AMNT PAIN NOTED NONE PRSNT: CPT | Mod: S$GLB,,, | Performed by: INTERNAL MEDICINE

## 2021-02-09 PROCEDURE — 1157F PR ADVANCE CARE PLAN OR EQUIV PRESENT IN MEDICAL RECORD: ICD-10-PCS | Mod: S$GLB,,, | Performed by: INTERNAL MEDICINE

## 2021-02-09 PROCEDURE — 99499 UNLISTED E&M SERVICE: CPT | Mod: S$GLB,,, | Performed by: INTERNAL MEDICINE

## 2021-02-09 PROCEDURE — 3288F PR FALLS RISK ASSESSMENT DOCUMENTED: ICD-10-PCS | Mod: CPTII,S$GLB,, | Performed by: INTERNAL MEDICINE

## 2021-02-09 PROCEDURE — 1126F PR PAIN SEVERITY QUANTIFIED, NO PAIN PRESENT: ICD-10-PCS | Mod: S$GLB,,, | Performed by: INTERNAL MEDICINE

## 2021-02-09 PROCEDURE — 1159F MED LIST DOCD IN RCRD: CPT | Mod: S$GLB,,, | Performed by: INTERNAL MEDICINE

## 2021-02-09 PROCEDURE — 99214 PR OFFICE/OUTPT VISIT, EST, LEVL IV, 30-39 MIN: ICD-10-PCS | Mod: S$GLB,,, | Performed by: INTERNAL MEDICINE

## 2021-02-09 PROCEDURE — 1157F ADVNC CARE PLAN IN RCRD: CPT | Mod: S$GLB,,, | Performed by: INTERNAL MEDICINE

## 2021-02-09 PROCEDURE — 99999 PR PBB SHADOW E&M-EST. PATIENT-LVL V: ICD-10-PCS | Mod: PBBFAC,,, | Performed by: INTERNAL MEDICINE

## 2021-02-09 PROCEDURE — 1100F PR PT FALLS ASSESS DOC 2+ FALLS/FALL W/INJURY/YR: ICD-10-PCS | Mod: CPTII,S$GLB,, | Performed by: INTERNAL MEDICINE

## 2021-02-09 PROCEDURE — 1100F PTFALLS ASSESS-DOCD GE2>/YR: CPT | Mod: CPTII,S$GLB,, | Performed by: INTERNAL MEDICINE

## 2021-02-09 PROCEDURE — 1159F PR MEDICATION LIST DOCUMENTED IN MEDICAL RECORD: ICD-10-PCS | Mod: S$GLB,,, | Performed by: INTERNAL MEDICINE

## 2021-02-09 PROCEDURE — 3288F FALL RISK ASSESSMENT DOCD: CPT | Mod: CPTII,S$GLB,, | Performed by: INTERNAL MEDICINE

## 2021-02-09 PROCEDURE — 99499 RISK ADDL DX/OHS AUDIT: ICD-10-PCS | Mod: S$GLB,,, | Performed by: INTERNAL MEDICINE

## 2021-02-11 ENCOUNTER — IMMUNIZATION (OUTPATIENT)
Dept: PRIMARY CARE CLINIC | Facility: CLINIC | Age: 86
End: 2021-02-11
Payer: MEDICARE

## 2021-02-11 DIAGNOSIS — Z23 NEED FOR VACCINATION: Primary | ICD-10-CM

## 2021-02-11 PROCEDURE — 0001A COVID-19, MRNA, LNP-S, PF, 30 MCG/0.3 ML DOSE VACCINE: CPT | Mod: CV19,S$GLB,, | Performed by: FAMILY MEDICINE

## 2021-02-11 PROCEDURE — 0001A COVID-19, MRNA, LNP-S, PF, 30 MCG/0.3 ML DOSE VACCINE: ICD-10-PCS | Mod: CV19,S$GLB,, | Performed by: FAMILY MEDICINE

## 2021-02-11 PROCEDURE — 91300 COVID-19, MRNA, LNP-S, PF, 30 MCG/0.3 ML DOSE VACCINE: CPT | Mod: S$GLB,,, | Performed by: FAMILY MEDICINE

## 2021-02-11 PROCEDURE — 91300 COVID-19, MRNA, LNP-S, PF, 30 MCG/0.3 ML DOSE VACCINE: ICD-10-PCS | Mod: S$GLB,,, | Performed by: FAMILY MEDICINE

## 2021-02-19 ENCOUNTER — TELEPHONE (OUTPATIENT)
Dept: CARDIOLOGY | Facility: CLINIC | Age: 86
End: 2021-02-19

## 2021-02-22 ENCOUNTER — TELEPHONE (OUTPATIENT)
Dept: CARDIOLOGY | Facility: CLINIC | Age: 86
End: 2021-02-22

## 2021-02-22 ENCOUNTER — LAB VISIT (OUTPATIENT)
Dept: LAB | Facility: HOSPITAL | Age: 86
End: 2021-02-22
Attending: FAMILY MEDICINE
Payer: MEDICARE

## 2021-02-22 ENCOUNTER — CLINICAL SUPPORT (OUTPATIENT)
Dept: CARDIOLOGY | Facility: CLINIC | Age: 86
End: 2021-02-22
Payer: MEDICARE

## 2021-02-22 DIAGNOSIS — N18.30 CHRONIC KIDNEY DISEASE, STAGE III (MODERATE): Primary | ICD-10-CM

## 2021-02-22 DIAGNOSIS — R80.9 PROTEINURIA: ICD-10-CM

## 2021-02-22 DIAGNOSIS — D64.9 ANEMIA, UNSPECIFIED: ICD-10-CM

## 2021-02-22 PROCEDURE — 80069 RENAL FUNCTION PANEL: CPT

## 2021-02-22 PROCEDURE — 36415 COLL VENOUS BLD VENIPUNCTURE: CPT | Mod: PO

## 2021-02-22 PROCEDURE — 85025 COMPLETE CBC W/AUTO DIFF WBC: CPT

## 2021-02-23 ENCOUNTER — TELEPHONE (OUTPATIENT)
Dept: CARDIOLOGY | Facility: CLINIC | Age: 86
End: 2021-02-23

## 2021-02-23 LAB
ALBUMIN SERPL BCP-MCNC: 3.4 G/DL (ref 3.5–5.2)
ANION GAP SERPL CALC-SCNC: 8 MMOL/L (ref 8–16)
BASOPHILS # BLD AUTO: 0.02 K/UL (ref 0–0.2)
BASOPHILS NFR BLD: 0.4 % (ref 0–1.9)
BUN SERPL-MCNC: 41 MG/DL (ref 8–23)
CALCIUM SERPL-MCNC: 8.5 MG/DL (ref 8.7–10.5)
CHLORIDE SERPL-SCNC: 111 MMOL/L (ref 95–110)
CO2 SERPL-SCNC: 22 MMOL/L (ref 23–29)
CREAT SERPL-MCNC: 1.8 MG/DL (ref 0.5–1.4)
DIFFERENTIAL METHOD: ABNORMAL
EOSINOPHIL # BLD AUTO: 0.5 K/UL (ref 0–0.5)
EOSINOPHIL NFR BLD: 8.8 % (ref 0–8)
ERYTHROCYTE [DISTWIDTH] IN BLOOD BY AUTOMATED COUNT: 14.9 % (ref 11.5–14.5)
EST. GFR  (AFRICAN AMERICAN): 28.2 ML/MIN/1.73 M^2
EST. GFR  (NON AFRICAN AMERICAN): 24.4 ML/MIN/1.73 M^2
GLUCOSE SERPL-MCNC: 120 MG/DL (ref 70–110)
HCT VFR BLD AUTO: 29.3 % (ref 37–48.5)
HGB BLD-MCNC: 9.1 G/DL (ref 12–16)
IMM GRANULOCYTES # BLD AUTO: 0.03 K/UL (ref 0–0.04)
IMM GRANULOCYTES NFR BLD AUTO: 0.6 % (ref 0–0.5)
LYMPHOCYTES # BLD AUTO: 1 K/UL (ref 1–4.8)
LYMPHOCYTES NFR BLD: 18.4 % (ref 18–48)
MCH RBC QN AUTO: 32.4 PG (ref 27–31)
MCHC RBC AUTO-ENTMCNC: 31.1 G/DL (ref 32–36)
MCV RBC AUTO: 104 FL (ref 82–98)
MONOCYTES # BLD AUTO: 0.5 K/UL (ref 0.3–1)
MONOCYTES NFR BLD: 9.8 % (ref 4–15)
NEUTROPHILS # BLD AUTO: 3.4 K/UL (ref 1.8–7.7)
NEUTROPHILS NFR BLD: 62 % (ref 38–73)
NRBC BLD-RTO: 0 /100 WBC
PHOSPHATE SERPL-MCNC: 3.8 MG/DL (ref 2.7–4.5)
PLATELET # BLD AUTO: 185 K/UL (ref 150–350)
PMV BLD AUTO: 11.6 FL (ref 9.2–12.9)
POTASSIUM SERPL-SCNC: 5 MMOL/L (ref 3.5–5.1)
RBC # BLD AUTO: 2.81 M/UL (ref 4–5.4)
SODIUM SERPL-SCNC: 141 MMOL/L (ref 136–145)
WBC # BLD AUTO: 5.43 K/UL (ref 3.9–12.7)

## 2021-02-24 ENCOUNTER — LAB VISIT (OUTPATIENT)
Dept: LAB | Facility: HOSPITAL | Age: 86
End: 2021-02-24
Attending: INTERNAL MEDICINE
Payer: MEDICARE

## 2021-02-24 DIAGNOSIS — D50.9 IRON DEFICIENCY ANEMIA, UNSPECIFIED: Primary | ICD-10-CM

## 2021-02-24 LAB
BASOPHILS # BLD AUTO: 0.03 K/UL (ref 0–0.2)
BASOPHILS NFR BLD: 0.4 % (ref 0–1.9)
DIFFERENTIAL METHOD: ABNORMAL
EOSINOPHIL # BLD AUTO: 0.4 K/UL (ref 0–0.5)
EOSINOPHIL NFR BLD: 5.5 % (ref 0–8)
ERYTHROCYTE [DISTWIDTH] IN BLOOD BY AUTOMATED COUNT: 15 % (ref 11.5–14.5)
HCT VFR BLD AUTO: 30 % (ref 37–48.5)
HGB BLD-MCNC: 9.5 G/DL (ref 12–16)
IMM GRANULOCYTES # BLD AUTO: 0.04 K/UL (ref 0–0.04)
IMM GRANULOCYTES NFR BLD AUTO: 0.6 % (ref 0–0.5)
LYMPHOCYTES # BLD AUTO: 1.3 K/UL (ref 1–4.8)
LYMPHOCYTES NFR BLD: 18.6 % (ref 18–48)
MCH RBC QN AUTO: 31.7 PG (ref 27–31)
MCHC RBC AUTO-ENTMCNC: 31.7 G/DL (ref 32–36)
MCV RBC AUTO: 100 FL (ref 82–98)
MONOCYTES # BLD AUTO: 0.7 K/UL (ref 0.3–1)
MONOCYTES NFR BLD: 10.6 % (ref 4–15)
NEUTROPHILS # BLD AUTO: 4.4 K/UL (ref 1.8–7.7)
NEUTROPHILS NFR BLD: 64.3 % (ref 38–73)
NRBC BLD-RTO: 0 /100 WBC
PLATELET # BLD AUTO: 191 K/UL (ref 150–350)
PMV BLD AUTO: 11 FL (ref 9.2–12.9)
RBC # BLD AUTO: 3 M/UL (ref 4–5.4)
WBC # BLD AUTO: 6.78 K/UL (ref 3.9–12.7)

## 2021-02-24 PROCEDURE — 85025 COMPLETE CBC W/AUTO DIFF WBC: CPT

## 2021-02-25 ENCOUNTER — TELEPHONE (OUTPATIENT)
Dept: CARDIOLOGY | Facility: CLINIC | Age: 86
End: 2021-02-25

## 2021-02-25 ENCOUNTER — OFFICE VISIT (OUTPATIENT)
Dept: HEMATOLOGY/ONCOLOGY | Facility: CLINIC | Age: 86
End: 2021-02-25
Payer: MEDICARE

## 2021-02-25 VITALS
RESPIRATION RATE: 16 BRPM | HEIGHT: 66 IN | HEART RATE: 68 BPM | DIASTOLIC BLOOD PRESSURE: 66 MMHG | SYSTOLIC BLOOD PRESSURE: 189 MMHG | WEIGHT: 134 LBS | BODY MASS INDEX: 21.53 KG/M2

## 2021-02-25 DIAGNOSIS — D53.9 MACROCYTIC ANEMIA: ICD-10-CM

## 2021-02-25 DIAGNOSIS — D64.9 CHRONIC ANEMIA: ICD-10-CM

## 2021-02-25 DIAGNOSIS — I26.99 OTHER ACUTE PULMONARY EMBOLISM WITHOUT ACUTE COR PULMONALE: ICD-10-CM

## 2021-02-25 DIAGNOSIS — Z15.89 HETEROZYGOUS MTHFR MUTATION C677T: ICD-10-CM

## 2021-02-25 DIAGNOSIS — D64.89 ANEMIA DUE TO MULTIPLE MECHANISMS: ICD-10-CM

## 2021-02-25 DIAGNOSIS — D63.8 ANEMIA OF CHRONIC DISEASE: ICD-10-CM

## 2021-02-25 DIAGNOSIS — Z79.01 CHRONIC ANTICOAGULATION: ICD-10-CM

## 2021-02-25 DIAGNOSIS — D50.0 IRON DEFICIENCY ANEMIA DUE TO CHRONIC BLOOD LOSS: ICD-10-CM

## 2021-02-25 DIAGNOSIS — Z86.711 HISTORY OF PULMONARY EMBOLISM: ICD-10-CM

## 2021-02-25 DIAGNOSIS — K92.2 GASTROINTESTINAL HEMORRHAGE, UNSPECIFIED GASTROINTESTINAL HEMORRHAGE TYPE: ICD-10-CM

## 2021-02-25 DIAGNOSIS — I82.4Y2 DEEP VEIN THROMBOSIS (DVT) OF PROXIMAL VEIN OF LEFT LOWER EXTREMITY, UNSPECIFIED CHRONICITY: Primary | ICD-10-CM

## 2021-02-25 DIAGNOSIS — D64.9 NORMOCYTIC NORMOCHROMIC ANEMIA: ICD-10-CM

## 2021-02-25 DIAGNOSIS — D64.9 ANEMIA, UNSPECIFIED TYPE: ICD-10-CM

## 2021-02-25 DIAGNOSIS — D53.9 NUTRITIONAL ANEMIA, UNSPECIFIED: ICD-10-CM

## 2021-02-25 DIAGNOSIS — F17.200 SMOKER: ICD-10-CM

## 2021-02-25 DIAGNOSIS — R79.83 HOMOCYSTEINEMIA: ICD-10-CM

## 2021-02-25 PROCEDURE — 1159F MED LIST DOCD IN RCRD: CPT | Mod: S$GLB,,, | Performed by: INTERNAL MEDICINE

## 2021-02-25 PROCEDURE — 3288F FALL RISK ASSESSMENT DOCD: CPT | Mod: S$GLB,,, | Performed by: INTERNAL MEDICINE

## 2021-02-25 PROCEDURE — 1125F PR PAIN SEVERITY QUANTIFIED, PAIN PRESENT: ICD-10-PCS | Mod: S$GLB,,, | Performed by: INTERNAL MEDICINE

## 2021-02-25 PROCEDURE — 99213 OFFICE O/P EST LOW 20 MIN: CPT | Mod: S$GLB,,, | Performed by: INTERNAL MEDICINE

## 2021-02-25 PROCEDURE — 3288F PR FALLS RISK ASSESSMENT DOCUMENTED: ICD-10-PCS | Mod: S$GLB,,, | Performed by: INTERNAL MEDICINE

## 2021-02-25 PROCEDURE — 1159F PR MEDICATION LIST DOCUMENTED IN MEDICAL RECORD: ICD-10-PCS | Mod: S$GLB,,, | Performed by: INTERNAL MEDICINE

## 2021-02-25 PROCEDURE — 99213 PR OFFICE/OUTPT VISIT, EST, LEVL III, 20-29 MIN: ICD-10-PCS | Mod: S$GLB,,, | Performed by: INTERNAL MEDICINE

## 2021-02-25 PROCEDURE — 1101F PR PT FALLS ASSESS DOC 0-1 FALLS W/OUT INJ PAST YR: ICD-10-PCS | Mod: S$GLB,,, | Performed by: INTERNAL MEDICINE

## 2021-02-25 PROCEDURE — 1125F AMNT PAIN NOTED PAIN PRSNT: CPT | Mod: S$GLB,,, | Performed by: INTERNAL MEDICINE

## 2021-02-25 PROCEDURE — 1157F PR ADVANCE CARE PLAN OR EQUIV PRESENT IN MEDICAL RECORD: ICD-10-PCS | Mod: S$GLB,,, | Performed by: INTERNAL MEDICINE

## 2021-02-25 PROCEDURE — 1157F ADVNC CARE PLAN IN RCRD: CPT | Mod: S$GLB,,, | Performed by: INTERNAL MEDICINE

## 2021-02-25 PROCEDURE — 1101F PT FALLS ASSESS-DOCD LE1/YR: CPT | Mod: S$GLB,,, | Performed by: INTERNAL MEDICINE

## 2021-02-27 DIAGNOSIS — E87.5 HYPERKALEMIA: Primary | ICD-10-CM

## 2021-03-01 ENCOUNTER — OFFICE VISIT (OUTPATIENT)
Dept: CARDIOLOGY | Facility: CLINIC | Age: 86
End: 2021-03-01
Payer: MEDICARE

## 2021-03-01 VITALS
OXYGEN SATURATION: 99 % | WEIGHT: 132 LBS | BODY MASS INDEX: 21.21 KG/M2 | RESPIRATION RATE: 16 BRPM | DIASTOLIC BLOOD PRESSURE: 56 MMHG | HEART RATE: 67 BPM | SYSTOLIC BLOOD PRESSURE: 142 MMHG | HEIGHT: 66 IN

## 2021-03-01 DIAGNOSIS — I50.32 DIASTOLIC CHF, CHRONIC: ICD-10-CM

## 2021-03-01 DIAGNOSIS — I15.2 HYPERTENSION ASSOCIATED WITH DIABETES: ICD-10-CM

## 2021-03-01 DIAGNOSIS — I11.9 HYPERTENSIVE LEFT VENTRICULAR HYPERTROPHY, WITHOUT HEART FAILURE: ICD-10-CM

## 2021-03-01 DIAGNOSIS — E11.69 HYPERLIPIDEMIA ASSOCIATED WITH TYPE 2 DIABETES MELLITUS: ICD-10-CM

## 2021-03-01 DIAGNOSIS — E11.59 HYPERTENSION ASSOCIATED WITH DIABETES: ICD-10-CM

## 2021-03-01 DIAGNOSIS — R09.89 RIGHT CAROTID BRUIT: ICD-10-CM

## 2021-03-01 DIAGNOSIS — E78.5 HYPERLIPIDEMIA ASSOCIATED WITH TYPE 2 DIABETES MELLITUS: ICD-10-CM

## 2021-03-01 DIAGNOSIS — I51.89 LEFT VENTRICULAR DIASTOLIC DYSFUNCTION WITH PRESERVED SYSTOLIC FUNCTION: ICD-10-CM

## 2021-03-01 PROCEDURE — 3288F PR FALLS RISK ASSESSMENT DOCUMENTED: ICD-10-PCS | Mod: CPTII,S$GLB,, | Performed by: INTERNAL MEDICINE

## 2021-03-01 PROCEDURE — 93000 EKG 12-LEAD: ICD-10-PCS | Mod: S$GLB,,, | Performed by: INTERNAL MEDICINE

## 2021-03-01 PROCEDURE — 1126F AMNT PAIN NOTED NONE PRSNT: CPT | Mod: S$GLB,,, | Performed by: INTERNAL MEDICINE

## 2021-03-01 PROCEDURE — 99499 RISK ADDL DX/OHS AUDIT: ICD-10-PCS | Mod: S$GLB,,, | Performed by: INTERNAL MEDICINE

## 2021-03-01 PROCEDURE — 99499 UNLISTED E&M SERVICE: CPT | Mod: S$GLB,,, | Performed by: INTERNAL MEDICINE

## 2021-03-01 PROCEDURE — 1159F PR MEDICATION LIST DOCUMENTED IN MEDICAL RECORD: ICD-10-PCS | Mod: S$GLB,,, | Performed by: INTERNAL MEDICINE

## 2021-03-01 PROCEDURE — 1159F MED LIST DOCD IN RCRD: CPT | Mod: S$GLB,,, | Performed by: INTERNAL MEDICINE

## 2021-03-01 PROCEDURE — 1101F PR PT FALLS ASSESS DOC 0-1 FALLS W/OUT INJ PAST YR: ICD-10-PCS | Mod: CPTII,S$GLB,, | Performed by: INTERNAL MEDICINE

## 2021-03-01 PROCEDURE — 1126F PR PAIN SEVERITY QUANTIFIED, NO PAIN PRESENT: ICD-10-PCS | Mod: S$GLB,,, | Performed by: INTERNAL MEDICINE

## 2021-03-01 PROCEDURE — 99214 OFFICE O/P EST MOD 30 MIN: CPT | Mod: 25,S$GLB,, | Performed by: INTERNAL MEDICINE

## 2021-03-01 PROCEDURE — 99214 PR OFFICE/OUTPT VISIT, EST, LEVL IV, 30-39 MIN: ICD-10-PCS | Mod: 25,S$GLB,, | Performed by: INTERNAL MEDICINE

## 2021-03-01 PROCEDURE — 1157F ADVNC CARE PLAN IN RCRD: CPT | Mod: S$GLB,,, | Performed by: INTERNAL MEDICINE

## 2021-03-01 PROCEDURE — 93000 ELECTROCARDIOGRAM COMPLETE: CPT | Mod: S$GLB,,, | Performed by: INTERNAL MEDICINE

## 2021-03-01 PROCEDURE — 3288F FALL RISK ASSESSMENT DOCD: CPT | Mod: CPTII,S$GLB,, | Performed by: INTERNAL MEDICINE

## 2021-03-01 PROCEDURE — 1101F PT FALLS ASSESS-DOCD LE1/YR: CPT | Mod: CPTII,S$GLB,, | Performed by: INTERNAL MEDICINE

## 2021-03-01 PROCEDURE — 1157F PR ADVANCE CARE PLAN OR EQUIV PRESENT IN MEDICAL RECORD: ICD-10-PCS | Mod: S$GLB,,, | Performed by: INTERNAL MEDICINE

## 2021-03-04 ENCOUNTER — IMMUNIZATION (OUTPATIENT)
Dept: PRIMARY CARE CLINIC | Facility: CLINIC | Age: 86
End: 2021-03-04
Payer: MEDICARE

## 2021-03-04 ENCOUNTER — HOSPITAL ENCOUNTER (OUTPATIENT)
Dept: CARDIOLOGY | Facility: CLINIC | Age: 86
Discharge: HOME | End: 2021-03-04
Attending: INTERNAL MEDICINE
Payer: MEDICARE

## 2021-03-04 DIAGNOSIS — Z23 NEED FOR VACCINATION: Primary | ICD-10-CM

## 2021-03-04 DIAGNOSIS — I50.32 DIASTOLIC CHF, CHRONIC: ICD-10-CM

## 2021-03-04 PROCEDURE — 91300 COVID-19, MRNA, LNP-S, PF, 30 MCG/0.3 ML DOSE VACCINE: ICD-10-PCS | Mod: S$GLB,,, | Performed by: FAMILY MEDICINE

## 2021-03-04 PROCEDURE — 0002A COVID-19, MRNA, LNP-S, PF, 30 MCG/0.3 ML DOSE VACCINE: ICD-10-PCS | Mod: CV19,S$GLB,, | Performed by: FAMILY MEDICINE

## 2021-03-04 PROCEDURE — 93298 CARDIAC DEVICE CHECK - REMOTE: ICD-10-PCS | Mod: S$GLB,,, | Performed by: INTERNAL MEDICINE

## 2021-03-04 PROCEDURE — 93298 REM INTERROG DEV EVAL SCRMS: CPT | Mod: S$GLB,,, | Performed by: INTERNAL MEDICINE

## 2021-03-04 PROCEDURE — 0002A COVID-19, MRNA, LNP-S, PF, 30 MCG/0.3 ML DOSE VACCINE: CPT | Mod: CV19,S$GLB,, | Performed by: FAMILY MEDICINE

## 2021-03-04 PROCEDURE — 91300 COVID-19, MRNA, LNP-S, PF, 30 MCG/0.3 ML DOSE VACCINE: CPT | Mod: S$GLB,,, | Performed by: FAMILY MEDICINE

## 2021-03-11 ENCOUNTER — PATIENT MESSAGE (OUTPATIENT)
Dept: FAMILY MEDICINE | Facility: CLINIC | Age: 86
End: 2021-03-11

## 2021-03-12 ENCOUNTER — LAB VISIT (OUTPATIENT)
Dept: LAB | Facility: HOSPITAL | Age: 86
End: 2021-03-12
Attending: PHYSICIAN ASSISTANT
Payer: MEDICARE

## 2021-03-12 ENCOUNTER — OFFICE VISIT (OUTPATIENT)
Dept: FAMILY MEDICINE | Facility: CLINIC | Age: 86
End: 2021-03-12
Payer: MEDICARE

## 2021-03-12 VITALS
TEMPERATURE: 98 F | HEART RATE: 65 BPM | BODY MASS INDEX: 21.16 KG/M2 | OXYGEN SATURATION: 98 % | HEIGHT: 66 IN | WEIGHT: 131.63 LBS | SYSTOLIC BLOOD PRESSURE: 134 MMHG | DIASTOLIC BLOOD PRESSURE: 58 MMHG

## 2021-03-12 DIAGNOSIS — R19.7 DIARRHEA, UNSPECIFIED TYPE: Primary | ICD-10-CM

## 2021-03-12 DIAGNOSIS — N18.4 CKD (CHRONIC KIDNEY DISEASE), STAGE IV: ICD-10-CM

## 2021-03-12 DIAGNOSIS — R19.7 DIARRHEA, UNSPECIFIED TYPE: ICD-10-CM

## 2021-03-12 PROCEDURE — 1126F PR PAIN SEVERITY QUANTIFIED, NO PAIN PRESENT: ICD-10-PCS | Mod: S$GLB,,, | Performed by: PHYSICIAN ASSISTANT

## 2021-03-12 PROCEDURE — 1101F PT FALLS ASSESS-DOCD LE1/YR: CPT | Mod: CPTII,S$GLB,, | Performed by: PHYSICIAN ASSISTANT

## 2021-03-12 PROCEDURE — 99499 UNLISTED E&M SERVICE: CPT | Mod: S$GLB,,, | Performed by: PHYSICIAN ASSISTANT

## 2021-03-12 PROCEDURE — 3288F FALL RISK ASSESSMENT DOCD: CPT | Mod: CPTII,S$GLB,, | Performed by: PHYSICIAN ASSISTANT

## 2021-03-12 PROCEDURE — 99999 PR PBB SHADOW E&M-EST. PATIENT-LVL V: ICD-10-PCS | Mod: PBBFAC,,, | Performed by: PHYSICIAN ASSISTANT

## 2021-03-12 PROCEDURE — 1126F AMNT PAIN NOTED NONE PRSNT: CPT | Mod: S$GLB,,, | Performed by: PHYSICIAN ASSISTANT

## 2021-03-12 PROCEDURE — 80053 COMPREHEN METABOLIC PANEL: CPT | Performed by: PHYSICIAN ASSISTANT

## 2021-03-12 PROCEDURE — 99999 PR PBB SHADOW E&M-EST. PATIENT-LVL V: CPT | Mod: PBBFAC,,, | Performed by: PHYSICIAN ASSISTANT

## 2021-03-12 PROCEDURE — 1101F PR PT FALLS ASSESS DOC 0-1 FALLS W/OUT INJ PAST YR: ICD-10-PCS | Mod: CPTII,S$GLB,, | Performed by: PHYSICIAN ASSISTANT

## 2021-03-12 PROCEDURE — 1157F PR ADVANCE CARE PLAN OR EQUIV PRESENT IN MEDICAL RECORD: ICD-10-PCS | Mod: S$GLB,,, | Performed by: PHYSICIAN ASSISTANT

## 2021-03-12 PROCEDURE — 1157F ADVNC CARE PLAN IN RCRD: CPT | Mod: S$GLB,,, | Performed by: PHYSICIAN ASSISTANT

## 2021-03-12 PROCEDURE — 36415 COLL VENOUS BLD VENIPUNCTURE: CPT | Mod: PO | Performed by: PHYSICIAN ASSISTANT

## 2021-03-12 PROCEDURE — 3288F PR FALLS RISK ASSESSMENT DOCUMENTED: ICD-10-PCS | Mod: CPTII,S$GLB,, | Performed by: PHYSICIAN ASSISTANT

## 2021-03-12 PROCEDURE — 1159F PR MEDICATION LIST DOCUMENTED IN MEDICAL RECORD: ICD-10-PCS | Mod: S$GLB,,, | Performed by: PHYSICIAN ASSISTANT

## 2021-03-12 PROCEDURE — 99214 PR OFFICE/OUTPT VISIT, EST, LEVL IV, 30-39 MIN: ICD-10-PCS | Mod: S$GLB,,, | Performed by: PHYSICIAN ASSISTANT

## 2021-03-12 PROCEDURE — 99499 RISK ADDL DX/OHS AUDIT: ICD-10-PCS | Mod: S$GLB,,, | Performed by: PHYSICIAN ASSISTANT

## 2021-03-12 PROCEDURE — 1159F MED LIST DOCD IN RCRD: CPT | Mod: S$GLB,,, | Performed by: PHYSICIAN ASSISTANT

## 2021-03-12 PROCEDURE — 99214 OFFICE O/P EST MOD 30 MIN: CPT | Mod: S$GLB,,, | Performed by: PHYSICIAN ASSISTANT

## 2021-03-12 RX ORDER — LOPERAMIDE HYDROCHLORIDE 2 MG/1
2 CAPSULE ORAL 4 TIMES DAILY PRN
Qty: 30 CAPSULE | Refills: 0 | Status: SHIPPED | OUTPATIENT
Start: 2021-03-12 | End: 2021-03-22

## 2021-03-13 LAB
ALBUMIN SERPL BCP-MCNC: 3.2 G/DL (ref 3.5–5.2)
ALP SERPL-CCNC: 77 U/L (ref 55–135)
ALT SERPL W/O P-5'-P-CCNC: 20 U/L (ref 10–44)
ANION GAP SERPL CALC-SCNC: 11 MMOL/L (ref 8–16)
AST SERPL-CCNC: 37 U/L (ref 10–40)
BILIRUB SERPL-MCNC: 0.4 MG/DL (ref 0.1–1)
BUN SERPL-MCNC: 59 MG/DL (ref 8–23)
CALCIUM SERPL-MCNC: 8.9 MG/DL (ref 8.7–10.5)
CHLORIDE SERPL-SCNC: 105 MMOL/L (ref 95–110)
CO2 SERPL-SCNC: 20 MMOL/L (ref 23–29)
CREAT SERPL-MCNC: 2.6 MG/DL (ref 0.5–1.4)
EST. GFR  (AFRICAN AMERICAN): 18.1 ML/MIN/1.73 M^2
EST. GFR  (NON AFRICAN AMERICAN): 15.7 ML/MIN/1.73 M^2
GLUCOSE SERPL-MCNC: 99 MG/DL (ref 70–110)
POTASSIUM SERPL-SCNC: 4.6 MMOL/L (ref 3.5–5.1)
PROT SERPL-MCNC: 6.7 G/DL (ref 6–8.4)
SODIUM SERPL-SCNC: 136 MMOL/L (ref 136–145)

## 2021-03-15 ENCOUNTER — TELEPHONE (OUTPATIENT)
Dept: FAMILY MEDICINE | Facility: CLINIC | Age: 86
End: 2021-03-15

## 2021-03-15 ENCOUNTER — HOSPITAL ENCOUNTER (EMERGENCY)
Facility: HOSPITAL | Age: 86
Discharge: HOME | End: 2021-03-15
Attending: EMERGENCY MEDICINE
Payer: MEDICARE

## 2021-03-15 VITALS
DIASTOLIC BLOOD PRESSURE: 69 MMHG | HEIGHT: 63 IN | RESPIRATION RATE: 16 BRPM | HEART RATE: 68 BPM | WEIGHT: 130 LBS | SYSTOLIC BLOOD PRESSURE: 149 MMHG | OXYGEN SATURATION: 97 % | BODY MASS INDEX: 23.04 KG/M2 | TEMPERATURE: 98 F

## 2021-03-15 DIAGNOSIS — N17.9 AKI (ACUTE KIDNEY INJURY): Primary | ICD-10-CM

## 2021-03-15 LAB
ALBUMIN SERPL BCP-MCNC: 3.4 G/DL (ref 3.5–5.2)
ALP SERPL-CCNC: 111 U/L (ref 55–135)
ALT SERPL W/O P-5'-P-CCNC: 52 U/L (ref 10–44)
ANION GAP SERPL CALC-SCNC: 10 MMOL/L (ref 8–16)
AST SERPL-CCNC: 52 U/L (ref 10–40)
BASOPHILS # BLD AUTO: 0.03 K/UL (ref 0–0.2)
BASOPHILS NFR BLD: 0.4 % (ref 0–1.9)
BILIRUB SERPL-MCNC: 0.5 MG/DL (ref 0.1–1)
BUN SERPL-MCNC: 49 MG/DL (ref 8–23)
CALCIUM SERPL-MCNC: 8.5 MG/DL (ref 8.7–10.5)
CHLORIDE SERPL-SCNC: 103 MMOL/L (ref 95–110)
CO2 SERPL-SCNC: 19 MMOL/L (ref 23–29)
CREAT SERPL-MCNC: 1.9 MG/DL (ref 0.5–1.4)
CREAT SERPL-MCNC: 2 MG/DL (ref 0.5–1.4)
DIFFERENTIAL METHOD: ABNORMAL
EOSINOPHIL # BLD AUTO: 0.2 K/UL (ref 0–0.5)
EOSINOPHIL NFR BLD: 2.5 % (ref 0–8)
ERYTHROCYTE [DISTWIDTH] IN BLOOD BY AUTOMATED COUNT: 13.8 % (ref 11.5–14.5)
EST. GFR  (AFRICAN AMERICAN): 24.8 ML/MIN/1.73 M^2
EST. GFR  (NON AFRICAN AMERICAN): 21.5 ML/MIN/1.73 M^2
GLUCOSE SERPL-MCNC: 115 MG/DL (ref 70–110)
HCT VFR BLD AUTO: 26.2 % (ref 37–48.5)
HGB BLD-MCNC: 8.7 G/DL (ref 12–16)
IMM GRANULOCYTES # BLD AUTO: 0.09 K/UL (ref 0–0.04)
IMM GRANULOCYTES NFR BLD AUTO: 1.2 % (ref 0–0.5)
LYMPHOCYTES # BLD AUTO: 1.1 K/UL (ref 1–4.8)
LYMPHOCYTES NFR BLD: 14.8 % (ref 18–48)
MCH RBC QN AUTO: 31.9 PG (ref 27–31)
MCHC RBC AUTO-ENTMCNC: 33.2 G/DL (ref 32–36)
MCV RBC AUTO: 96 FL (ref 82–98)
MONOCYTES # BLD AUTO: 0.7 K/UL (ref 0.3–1)
MONOCYTES NFR BLD: 9.4 % (ref 4–15)
NEUTROPHILS # BLD AUTO: 5.4 K/UL (ref 1.8–7.7)
NEUTROPHILS NFR BLD: 71.7 % (ref 38–73)
NRBC BLD-RTO: 0 /100 WBC
PLATELET # BLD AUTO: 234 K/UL (ref 150–350)
PMV BLD AUTO: 10.5 FL (ref 9.2–12.9)
POTASSIUM SERPL-SCNC: 4 MMOL/L (ref 3.5–5.1)
PROT SERPL-MCNC: 6.7 G/DL (ref 6–8.4)
RBC # BLD AUTO: 2.73 M/UL (ref 4–5.4)
SAMPLE: ABNORMAL
SODIUM SERPL-SCNC: 132 MMOL/L (ref 136–145)
WBC # BLD AUTO: 7.58 K/UL (ref 3.9–12.7)

## 2021-03-15 PROCEDURE — 99284 EMERGENCY DEPT VISIT MOD MDM: CPT | Mod: 25

## 2021-03-15 PROCEDURE — 25000003 PHARM REV CODE 250: Performed by: EMERGENCY MEDICINE

## 2021-03-15 PROCEDURE — 85025 COMPLETE CBC W/AUTO DIFF WBC: CPT | Performed by: NURSE PRACTITIONER

## 2021-03-15 PROCEDURE — 96360 HYDRATION IV INFUSION INIT: CPT | Mod: GZ

## 2021-03-15 PROCEDURE — 80053 COMPREHEN METABOLIC PANEL: CPT | Performed by: NURSE PRACTITIONER

## 2021-03-15 RX ORDER — SODIUM CHLORIDE 9 MG/ML
INJECTION, SOLUTION INTRAVENOUS
Status: COMPLETED | OUTPATIENT
Start: 2021-03-15 | End: 2021-03-15

## 2021-03-15 RX ADMIN — SODIUM CHLORIDE: 0.9 INJECTION, SOLUTION INTRAVENOUS at 07:03

## 2021-03-16 ENCOUNTER — TELEPHONE (OUTPATIENT)
Dept: FAMILY MEDICINE | Facility: CLINIC | Age: 86
End: 2021-03-16

## 2021-03-17 ENCOUNTER — PATIENT OUTREACH (OUTPATIENT)
Dept: ADMINISTRATIVE | Facility: OTHER | Age: 86
End: 2021-03-17

## 2021-03-22 ENCOUNTER — OFFICE VISIT (OUTPATIENT)
Dept: OPHTHALMOLOGY | Facility: CLINIC | Age: 86
End: 2021-03-22
Payer: MEDICARE

## 2021-03-22 DIAGNOSIS — E11.9 DIABETES MELLITUS TYPE 2 WITHOUT RETINOPATHY: Primary | ICD-10-CM

## 2021-03-22 DIAGNOSIS — D31.32 CHOROIDAL NEVUS, LEFT EYE: ICD-10-CM

## 2021-03-22 DIAGNOSIS — Z96.1 PSEUDOPHAKIA, BOTH EYES: ICD-10-CM

## 2021-03-22 PROCEDURE — 3288F PR FALLS RISK ASSESSMENT DOCUMENTED: ICD-10-PCS | Mod: CPTII,S$GLB,, | Performed by: OPHTHALMOLOGY

## 2021-03-22 PROCEDURE — 1126F AMNT PAIN NOTED NONE PRSNT: CPT | Mod: S$GLB,,, | Performed by: OPHTHALMOLOGY

## 2021-03-22 PROCEDURE — 1157F ADVNC CARE PLAN IN RCRD: CPT | Mod: S$GLB,,, | Performed by: OPHTHALMOLOGY

## 2021-03-22 PROCEDURE — 1101F PR PT FALLS ASSESS DOC 0-1 FALLS W/OUT INJ PAST YR: ICD-10-PCS | Mod: CPTII,S$GLB,, | Performed by: OPHTHALMOLOGY

## 2021-03-22 PROCEDURE — 92004 PR EYE EXAM, NEW PATIENT,COMPREHESV: ICD-10-PCS | Mod: S$GLB,,, | Performed by: OPHTHALMOLOGY

## 2021-03-22 PROCEDURE — 2023F DILAT RTA XM W/O RTNOPTHY: CPT | Mod: S$GLB,,, | Performed by: OPHTHALMOLOGY

## 2021-03-22 PROCEDURE — 1157F PR ADVANCE CARE PLAN OR EQUIV PRESENT IN MEDICAL RECORD: ICD-10-PCS | Mod: S$GLB,,, | Performed by: OPHTHALMOLOGY

## 2021-03-22 PROCEDURE — 1101F PT FALLS ASSESS-DOCD LE1/YR: CPT | Mod: CPTII,S$GLB,, | Performed by: OPHTHALMOLOGY

## 2021-03-22 PROCEDURE — 99999 PR PBB SHADOW E&M-EST. PATIENT-LVL IV: CPT | Mod: PBBFAC,,, | Performed by: OPHTHALMOLOGY

## 2021-03-22 PROCEDURE — 1126F PR PAIN SEVERITY QUANTIFIED, NO PAIN PRESENT: ICD-10-PCS | Mod: S$GLB,,, | Performed by: OPHTHALMOLOGY

## 2021-03-22 PROCEDURE — 92004 COMPRE OPH EXAM NEW PT 1/>: CPT | Mod: S$GLB,,, | Performed by: OPHTHALMOLOGY

## 2021-03-22 PROCEDURE — 99499 UNLISTED E&M SERVICE: CPT | Mod: S$GLB,,, | Performed by: OPHTHALMOLOGY

## 2021-03-22 PROCEDURE — 2023F PR DILATED RETINAL EXAM W/O EVID OF RETINOPATHY: ICD-10-PCS | Mod: S$GLB,,, | Performed by: OPHTHALMOLOGY

## 2021-03-22 PROCEDURE — 99999 PR PBB SHADOW E&M-EST. PATIENT-LVL IV: ICD-10-PCS | Mod: PBBFAC,,, | Performed by: OPHTHALMOLOGY

## 2021-03-22 PROCEDURE — 3288F FALL RISK ASSESSMENT DOCD: CPT | Mod: CPTII,S$GLB,, | Performed by: OPHTHALMOLOGY

## 2021-03-22 PROCEDURE — 99499 RISK ADDL DX/OHS AUDIT: ICD-10-PCS | Mod: S$GLB,,, | Performed by: OPHTHALMOLOGY

## 2021-03-23 ENCOUNTER — OFFICE VISIT (OUTPATIENT)
Dept: FAMILY MEDICINE | Facility: CLINIC | Age: 86
End: 2021-03-23
Payer: MEDICARE

## 2021-03-23 VITALS
OXYGEN SATURATION: 96 % | DIASTOLIC BLOOD PRESSURE: 58 MMHG | SYSTOLIC BLOOD PRESSURE: 136 MMHG | HEIGHT: 63 IN | WEIGHT: 134.25 LBS | HEART RATE: 75 BPM | TEMPERATURE: 97 F | BODY MASS INDEX: 23.79 KG/M2

## 2021-03-23 DIAGNOSIS — D50.0 IRON DEFICIENCY ANEMIA DUE TO CHRONIC BLOOD LOSS: ICD-10-CM

## 2021-03-23 DIAGNOSIS — R19.7 DIARRHEA, UNSPECIFIED TYPE: Primary | ICD-10-CM

## 2021-03-23 PROCEDURE — 99999 PR PBB SHADOW E&M-EST. PATIENT-LVL III: ICD-10-PCS | Mod: PBBFAC,,, | Performed by: PHYSICIAN ASSISTANT

## 2021-03-23 PROCEDURE — 1126F AMNT PAIN NOTED NONE PRSNT: CPT | Mod: S$GLB,,, | Performed by: PHYSICIAN ASSISTANT

## 2021-03-23 PROCEDURE — 99999 PR PBB SHADOW E&M-EST. PATIENT-LVL III: CPT | Mod: PBBFAC,,, | Performed by: PHYSICIAN ASSISTANT

## 2021-03-23 PROCEDURE — 1159F PR MEDICATION LIST DOCUMENTED IN MEDICAL RECORD: ICD-10-PCS | Mod: S$GLB,,, | Performed by: PHYSICIAN ASSISTANT

## 2021-03-23 PROCEDURE — 1157F PR ADVANCE CARE PLAN OR EQUIV PRESENT IN MEDICAL RECORD: ICD-10-PCS | Mod: S$GLB,,, | Performed by: PHYSICIAN ASSISTANT

## 2021-03-23 PROCEDURE — 1126F PR PAIN SEVERITY QUANTIFIED, NO PAIN PRESENT: ICD-10-PCS | Mod: S$GLB,,, | Performed by: PHYSICIAN ASSISTANT

## 2021-03-23 PROCEDURE — 1159F MED LIST DOCD IN RCRD: CPT | Mod: S$GLB,,, | Performed by: PHYSICIAN ASSISTANT

## 2021-03-23 PROCEDURE — 99213 PR OFFICE/OUTPT VISIT, EST, LEVL III, 20-29 MIN: ICD-10-PCS | Mod: S$GLB,,, | Performed by: PHYSICIAN ASSISTANT

## 2021-03-23 PROCEDURE — 1157F ADVNC CARE PLAN IN RCRD: CPT | Mod: S$GLB,,, | Performed by: PHYSICIAN ASSISTANT

## 2021-03-23 PROCEDURE — 99213 OFFICE O/P EST LOW 20 MIN: CPT | Mod: S$GLB,,, | Performed by: PHYSICIAN ASSISTANT

## 2021-03-23 RX ORDER — FERROUS SULFATE 325(65) MG
325 TABLET ORAL DAILY
Qty: 180 TABLET | Refills: 3
Start: 2021-03-23 | End: 2021-09-28

## 2021-03-24 ENCOUNTER — LAB VISIT (OUTPATIENT)
Dept: LAB | Facility: HOSPITAL | Age: 86
End: 2021-03-24
Attending: INTERNAL MEDICINE
Payer: MEDICARE

## 2021-03-24 DIAGNOSIS — D64.89 ANEMIA DUE TO MULTIPLE MECHANISMS: ICD-10-CM

## 2021-03-24 DIAGNOSIS — D50.0 IRON DEFICIENCY ANEMIA DUE TO CHRONIC BLOOD LOSS: ICD-10-CM

## 2021-03-24 DIAGNOSIS — D53.9 MACROCYTIC ANEMIA: ICD-10-CM

## 2021-03-24 DIAGNOSIS — E87.5 HYPERKALEMIA: ICD-10-CM

## 2021-03-24 DIAGNOSIS — D63.8 ANEMIA OF CHRONIC DISEASE: ICD-10-CM

## 2021-03-24 LAB
BASOPHILS # BLD AUTO: 0.03 K/UL (ref 0–0.2)
BASOPHILS NFR BLD: 0.4 % (ref 0–1.9)
DIFFERENTIAL METHOD: ABNORMAL
EOSINOPHIL # BLD AUTO: 0.3 K/UL (ref 0–0.5)
EOSINOPHIL NFR BLD: 3.7 % (ref 0–8)
ERYTHROCYTE [DISTWIDTH] IN BLOOD BY AUTOMATED COUNT: 13.9 % (ref 11.5–14.5)
HCT VFR BLD AUTO: 26.2 % (ref 37–48.5)
HGB BLD-MCNC: 8.6 G/DL (ref 12–16)
IMM GRANULOCYTES # BLD AUTO: 0.08 K/UL (ref 0–0.04)
IMM GRANULOCYTES NFR BLD AUTO: 0.9 % (ref 0–0.5)
LYMPHOCYTES # BLD AUTO: 1.3 K/UL (ref 1–4.8)
LYMPHOCYTES NFR BLD: 15.3 % (ref 18–48)
MCH RBC QN AUTO: 32.8 PG (ref 27–31)
MCHC RBC AUTO-ENTMCNC: 32.8 G/DL (ref 32–36)
MCV RBC AUTO: 100 FL (ref 82–98)
MONOCYTES # BLD AUTO: 0.7 K/UL (ref 0.3–1)
MONOCYTES NFR BLD: 8.4 % (ref 4–15)
NEUTROPHILS # BLD AUTO: 6 K/UL (ref 1.8–7.7)
NEUTROPHILS NFR BLD: 71.3 % (ref 38–73)
NRBC BLD-RTO: 0 /100 WBC
PLATELET # BLD AUTO: 322 K/UL (ref 150–350)
PMV BLD AUTO: 11.3 FL (ref 9.2–12.9)
RBC # BLD AUTO: 2.62 M/UL (ref 4–5.4)
WBC # BLD AUTO: 8.47 K/UL (ref 3.9–12.7)

## 2021-03-24 PROCEDURE — 85025 COMPLETE CBC W/AUTO DIFF WBC: CPT | Performed by: INTERNAL MEDICINE

## 2021-03-24 PROCEDURE — 80053 COMPREHEN METABOLIC PANEL: CPT | Performed by: INTERNAL MEDICINE

## 2021-03-24 PROCEDURE — 82728 ASSAY OF FERRITIN: CPT | Performed by: INTERNAL MEDICINE

## 2021-03-24 PROCEDURE — 83540 ASSAY OF IRON: CPT | Performed by: INTERNAL MEDICINE

## 2021-03-25 LAB
ALBUMIN SERPL BCP-MCNC: 3 G/DL (ref 3.5–5.2)
ALP SERPL-CCNC: 88 U/L (ref 55–135)
ALT SERPL W/O P-5'-P-CCNC: 15 U/L (ref 10–44)
ANION GAP SERPL CALC-SCNC: 12 MMOL/L (ref 8–16)
AST SERPL-CCNC: 22 U/L (ref 10–40)
BILIRUB SERPL-MCNC: 0.2 MG/DL (ref 0.1–1)
BUN SERPL-MCNC: 39 MG/DL (ref 8–23)
CALCIUM SERPL-MCNC: 8.3 MG/DL (ref 8.7–10.5)
CHLORIDE SERPL-SCNC: 100 MMOL/L (ref 95–110)
CO2 SERPL-SCNC: 20 MMOL/L (ref 23–29)
CREAT SERPL-MCNC: 1.8 MG/DL (ref 0.5–1.4)
EST. GFR  (AFRICAN AMERICAN): 28.2 ML/MIN/1.73 M^2
EST. GFR  (NON AFRICAN AMERICAN): 24.4 ML/MIN/1.73 M^2
FERRITIN SERPL-MCNC: 164 NG/ML (ref 20–300)
GLUCOSE SERPL-MCNC: 99 MG/DL (ref 70–110)
IRON SERPL-MCNC: 36 UG/DL (ref 30–160)
POTASSIUM SERPL-SCNC: 4.9 MMOL/L (ref 3.5–5.1)
POTASSIUM SERPL-SCNC: 4.9 MMOL/L (ref 3.5–5.1)
PROT SERPL-MCNC: 6.4 G/DL (ref 6–8.4)
SATURATED IRON: 13 % (ref 20–50)
SODIUM SERPL-SCNC: 132 MMOL/L (ref 136–145)
TOTAL IRON BINDING CAPACITY: 272 UG/DL (ref 250–450)
TRANSFERRIN SERPL-MCNC: 184 MG/DL (ref 200–375)

## 2021-03-26 ENCOUNTER — PATIENT MESSAGE (OUTPATIENT)
Dept: FAMILY MEDICINE | Facility: CLINIC | Age: 86
End: 2021-03-26

## 2021-03-26 DIAGNOSIS — R79.89 PRERENAL AZOTEMIA: ICD-10-CM

## 2021-03-26 DIAGNOSIS — N18.4 TYPE 2 DIABETES MELLITUS WITH STAGE 4 CHRONIC KIDNEY DISEASE, WITHOUT LONG-TERM CURRENT USE OF INSULIN: ICD-10-CM

## 2021-03-26 DIAGNOSIS — E11.22 TYPE 2 DIABETES MELLITUS WITH STAGE 4 CHRONIC KIDNEY DISEASE, WITHOUT LONG-TERM CURRENT USE OF INSULIN: ICD-10-CM

## 2021-03-29 ENCOUNTER — PATIENT MESSAGE (OUTPATIENT)
Dept: FAMILY MEDICINE | Facility: CLINIC | Age: 86
End: 2021-03-29

## 2021-03-29 DIAGNOSIS — N18.4 TYPE 2 DIABETES MELLITUS WITH STAGE 4 CHRONIC KIDNEY DISEASE, WITHOUT LONG-TERM CURRENT USE OF INSULIN: ICD-10-CM

## 2021-03-29 DIAGNOSIS — E11.22 TYPE 2 DIABETES MELLITUS WITH STAGE 4 CHRONIC KIDNEY DISEASE, WITHOUT LONG-TERM CURRENT USE OF INSULIN: ICD-10-CM

## 2021-03-29 DIAGNOSIS — R79.89 PRERENAL AZOTEMIA: ICD-10-CM

## 2021-03-29 DIAGNOSIS — I50.32 DIASTOLIC CHF, CHRONIC: Primary | ICD-10-CM

## 2021-03-29 RX ORDER — FUROSEMIDE 20 MG/1
TABLET ORAL
Qty: 90 TABLET | Refills: 3 | Status: SHIPPED | OUTPATIENT
Start: 2021-03-29 | End: 2021-03-30 | Stop reason: SDUPTHER

## 2021-03-30 ENCOUNTER — PATIENT MESSAGE (OUTPATIENT)
Dept: FAMILY MEDICINE | Facility: CLINIC | Age: 86
End: 2021-03-30

## 2021-03-30 DIAGNOSIS — N18.4 TYPE 2 DIABETES MELLITUS WITH STAGE 4 CHRONIC KIDNEY DISEASE, WITHOUT LONG-TERM CURRENT USE OF INSULIN: ICD-10-CM

## 2021-03-30 DIAGNOSIS — R79.89 PRERENAL AZOTEMIA: ICD-10-CM

## 2021-03-30 DIAGNOSIS — E11.22 TYPE 2 DIABETES MELLITUS WITH STAGE 4 CHRONIC KIDNEY DISEASE, WITHOUT LONG-TERM CURRENT USE OF INSULIN: ICD-10-CM

## 2021-03-30 RX ORDER — FUROSEMIDE 20 MG/1
TABLET ORAL
Qty: 90 TABLET | Refills: 3 | Status: ON HOLD | OUTPATIENT
Start: 2021-03-30 | End: 2022-01-01 | Stop reason: SDUPTHER

## 2021-03-30 RX ORDER — FUROSEMIDE 20 MG/1
TABLET ORAL
Qty: 90 TABLET | Refills: 3 | Status: CANCELLED | OUTPATIENT
Start: 2021-03-30

## 2021-03-31 ENCOUNTER — TELEPHONE (OUTPATIENT)
Dept: PODIATRY | Facility: CLINIC | Age: 86
End: 2021-03-31

## 2021-04-07 ENCOUNTER — OFFICE VISIT (OUTPATIENT)
Dept: FAMILY MEDICINE | Facility: CLINIC | Age: 86
End: 2021-04-07
Payer: MEDICARE

## 2021-04-07 ENCOUNTER — APPOINTMENT (OUTPATIENT)
Dept: LAB | Facility: HOSPITAL | Age: 86
End: 2021-04-07
Attending: INTERNAL MEDICINE
Payer: MEDICARE

## 2021-04-07 VITALS
HEIGHT: 63 IN | SYSTOLIC BLOOD PRESSURE: 130 MMHG | HEART RATE: 79 BPM | WEIGHT: 128.5 LBS | BODY MASS INDEX: 22.77 KG/M2 | DIASTOLIC BLOOD PRESSURE: 52 MMHG | TEMPERATURE: 99 F | OXYGEN SATURATION: 96 %

## 2021-04-07 DIAGNOSIS — E11.40 TYPE 2 DIABETES MELLITUS WITH DIABETIC NEUROPATHY, WITHOUT LONG-TERM CURRENT USE OF INSULIN: ICD-10-CM

## 2021-04-07 DIAGNOSIS — E03.9 HYPOTHYROIDISM, UNSPECIFIED TYPE: ICD-10-CM

## 2021-04-07 DIAGNOSIS — I15.2 HYPERTENSION ASSOCIATED WITH DIABETES: Primary | ICD-10-CM

## 2021-04-07 DIAGNOSIS — E11.59 HYPERTENSION ASSOCIATED WITH DIABETES: Primary | ICD-10-CM

## 2021-04-07 DIAGNOSIS — E11.69 HYPERLIPIDEMIA ASSOCIATED WITH TYPE 2 DIABETES MELLITUS: ICD-10-CM

## 2021-04-07 DIAGNOSIS — J44.9 COPD MIXED TYPE: ICD-10-CM

## 2021-04-07 DIAGNOSIS — N18.4 CONTROLLED TYPE 2 DIABETES MELLITUS WITH STAGE 4 CHRONIC KIDNEY DISEASE, WITHOUT LONG-TERM CURRENT USE OF INSULIN: ICD-10-CM

## 2021-04-07 DIAGNOSIS — E78.5 HYPERLIPIDEMIA ASSOCIATED WITH TYPE 2 DIABETES MELLITUS: ICD-10-CM

## 2021-04-07 DIAGNOSIS — E11.22 CONTROLLED TYPE 2 DIABETES MELLITUS WITH STAGE 4 CHRONIC KIDNEY DISEASE, WITHOUT LONG-TERM CURRENT USE OF INSULIN: ICD-10-CM

## 2021-04-07 PROCEDURE — 1157F ADVNC CARE PLAN IN RCRD: CPT | Mod: S$GLB,,, | Performed by: NURSE PRACTITIONER

## 2021-04-07 PROCEDURE — 99499 UNLISTED E&M SERVICE: CPT | Mod: S$GLB,,, | Performed by: NURSE PRACTITIONER

## 2021-04-07 PROCEDURE — 1101F PT FALLS ASSESS-DOCD LE1/YR: CPT | Mod: CPTII,S$GLB,, | Performed by: NURSE PRACTITIONER

## 2021-04-07 PROCEDURE — 99999 PR PBB SHADOW E&M-EST. PATIENT-LVL III: CPT | Mod: PBBFAC,,, | Performed by: NURSE PRACTITIONER

## 2021-04-07 PROCEDURE — 3288F PR FALLS RISK ASSESSMENT DOCUMENTED: ICD-10-PCS | Mod: CPTII,S$GLB,, | Performed by: NURSE PRACTITIONER

## 2021-04-07 PROCEDURE — 1101F PR PT FALLS ASSESS DOC 0-1 FALLS W/OUT INJ PAST YR: ICD-10-PCS | Mod: CPTII,S$GLB,, | Performed by: NURSE PRACTITIONER

## 2021-04-07 PROCEDURE — 99999 PR PBB SHADOW E&M-EST. PATIENT-LVL III: ICD-10-PCS | Mod: PBBFAC,,, | Performed by: NURSE PRACTITIONER

## 2021-04-07 PROCEDURE — 1159F PR MEDICATION LIST DOCUMENTED IN MEDICAL RECORD: ICD-10-PCS | Mod: S$GLB,,, | Performed by: NURSE PRACTITIONER

## 2021-04-07 PROCEDURE — 3288F FALL RISK ASSESSMENT DOCD: CPT | Mod: CPTII,S$GLB,, | Performed by: NURSE PRACTITIONER

## 2021-04-07 PROCEDURE — 99499 RISK ADDL DX/OHS AUDIT: ICD-10-PCS | Mod: S$GLB,,, | Performed by: NURSE PRACTITIONER

## 2021-04-07 PROCEDURE — 1157F PR ADVANCE CARE PLAN OR EQUIV PRESENT IN MEDICAL RECORD: ICD-10-PCS | Mod: S$GLB,,, | Performed by: NURSE PRACTITIONER

## 2021-04-07 PROCEDURE — 99214 OFFICE O/P EST MOD 30 MIN: CPT | Mod: S$GLB,,, | Performed by: NURSE PRACTITIONER

## 2021-04-07 PROCEDURE — 1159F MED LIST DOCD IN RCRD: CPT | Mod: S$GLB,,, | Performed by: NURSE PRACTITIONER

## 2021-04-07 PROCEDURE — 99214 PR OFFICE/OUTPT VISIT, EST, LEVL IV, 30-39 MIN: ICD-10-PCS | Mod: S$GLB,,, | Performed by: NURSE PRACTITIONER

## 2021-04-07 RX ORDER — LANCETS
EACH MISCELLANEOUS
Qty: 100 EACH | Refills: 0 | Status: SHIPPED | OUTPATIENT
Start: 2021-04-07 | End: 2021-09-14 | Stop reason: CLARIF

## 2021-04-07 RX ORDER — INSULIN PUMP SYRINGE, 3 ML
EACH MISCELLANEOUS
Qty: 1 EACH | Refills: 0 | Status: SHIPPED | OUTPATIENT
Start: 2021-04-07 | End: 2021-09-14 | Stop reason: CLARIF

## 2021-04-19 ENCOUNTER — HOSPITAL ENCOUNTER (OUTPATIENT)
Dept: CARDIOLOGY | Facility: CLINIC | Age: 86
Discharge: HOME | End: 2021-04-19
Attending: INTERNAL MEDICINE
Payer: MEDICARE

## 2021-04-19 DIAGNOSIS — I51.89 LEFT VENTRICULAR DIASTOLIC DYSFUNCTION WITH PRESERVED SYSTOLIC FUNCTION: ICD-10-CM

## 2021-04-19 DIAGNOSIS — Z95.818 PRESENCE OF OTHER CARDIAC IMPLANTS AND GRAFTS: ICD-10-CM

## 2021-04-19 PROCEDURE — 93298 REM INTERROG DEV EVAL SCRMS: CPT | Mod: S$GLB,,, | Performed by: INTERNAL MEDICINE

## 2021-04-19 PROCEDURE — 93298 CARDIAC DEVICE CHECK - REMOTE: ICD-10-PCS | Mod: S$GLB,,, | Performed by: INTERNAL MEDICINE

## 2021-04-26 ENCOUNTER — LAB VISIT (OUTPATIENT)
Dept: LAB | Facility: HOSPITAL | Age: 86
End: 2021-04-26
Attending: INTERNAL MEDICINE
Payer: MEDICARE

## 2021-04-26 DIAGNOSIS — D50.9 IRON DEFICIENCY ANEMIA, UNSPECIFIED: Primary | ICD-10-CM

## 2021-04-26 LAB
BASOPHILS # BLD AUTO: 0.03 K/UL (ref 0–0.2)
BASOPHILS NFR BLD: 0.5 % (ref 0–1.9)
DIFFERENTIAL METHOD: ABNORMAL
EOSINOPHIL # BLD AUTO: 0.4 K/UL (ref 0–0.5)
EOSINOPHIL NFR BLD: 6.1 % (ref 0–8)
ERYTHROCYTE [DISTWIDTH] IN BLOOD BY AUTOMATED COUNT: 14.6 % (ref 11.5–14.5)
HCT VFR BLD AUTO: 29.9 % (ref 37–48.5)
HGB BLD-MCNC: 9.4 G/DL (ref 12–16)
IMM GRANULOCYTES # BLD AUTO: 0.01 K/UL (ref 0–0.04)
IMM GRANULOCYTES NFR BLD AUTO: 0.2 % (ref 0–0.5)
LYMPHOCYTES # BLD AUTO: 1 K/UL (ref 1–4.8)
LYMPHOCYTES NFR BLD: 16.8 % (ref 18–48)
MCH RBC QN AUTO: 32 PG (ref 27–31)
MCHC RBC AUTO-ENTMCNC: 31.4 G/DL (ref 32–36)
MCV RBC AUTO: 102 FL (ref 82–98)
MONOCYTES # BLD AUTO: 0.6 K/UL (ref 0.3–1)
MONOCYTES NFR BLD: 10.5 % (ref 4–15)
NEUTROPHILS # BLD AUTO: 4 K/UL (ref 1.8–7.7)
NEUTROPHILS NFR BLD: 65.9 % (ref 38–73)
NRBC BLD-RTO: 0 /100 WBC
PLATELET # BLD AUTO: 183 K/UL (ref 150–450)
PMV BLD AUTO: 11.5 FL (ref 9.2–12.9)
RBC # BLD AUTO: 2.94 M/UL (ref 4–5.4)
WBC # BLD AUTO: 6.02 K/UL (ref 3.9–12.7)

## 2021-04-26 PROCEDURE — 85025 COMPLETE CBC W/AUTO DIFF WBC: CPT | Mod: 91 | Performed by: INTERNAL MEDICINE

## 2021-04-29 DIAGNOSIS — I51.89 LEFT VENTRICULAR DIASTOLIC DYSFUNCTION WITH PRESERVED SYSTOLIC FUNCTION: ICD-10-CM

## 2021-04-29 DIAGNOSIS — Z95.818 PRESENCE OF OTHER CARDIAC IMPLANTS AND GRAFTS: Primary | ICD-10-CM

## 2021-05-19 ENCOUNTER — PATIENT OUTREACH (OUTPATIENT)
Dept: ADMINISTRATIVE | Facility: OTHER | Age: 86
End: 2021-05-19

## 2021-05-20 ENCOUNTER — OFFICE VISIT (OUTPATIENT)
Dept: DERMATOLOGY | Facility: CLINIC | Age: 86
End: 2021-05-20
Payer: MEDICARE

## 2021-05-20 DIAGNOSIS — L29.9 SCALP PRURITUS: Primary | ICD-10-CM

## 2021-05-20 DIAGNOSIS — L21.9 SEBORRHEIC DERMATITIS: ICD-10-CM

## 2021-05-20 DIAGNOSIS — L30.9 DERMATITIS: ICD-10-CM

## 2021-05-20 DIAGNOSIS — L81.9 HYPOPIGMENTATION: ICD-10-CM

## 2021-05-20 DIAGNOSIS — D48.5 NEOPLASM OF UNCERTAIN BEHAVIOR OF SKIN: ICD-10-CM

## 2021-05-20 PROCEDURE — 99204 OFFICE O/P NEW MOD 45 MIN: CPT | Mod: 25,S$GLB,, | Performed by: STUDENT IN AN ORGANIZED HEALTH CARE EDUCATION/TRAINING PROGRAM

## 2021-05-20 PROCEDURE — 88305 TISSUE EXAM BY PATHOLOGIST: CPT | Performed by: DERMATOLOGY

## 2021-05-20 PROCEDURE — 1157F PR ADVANCE CARE PLAN OR EQUIV PRESENT IN MEDICAL RECORD: ICD-10-PCS | Mod: S$GLB,,, | Performed by: STUDENT IN AN ORGANIZED HEALTH CARE EDUCATION/TRAINING PROGRAM

## 2021-05-20 PROCEDURE — 88305 TISSUE EXAM BY PATHOLOGIST: ICD-10-PCS | Mod: 26,,, | Performed by: DERMATOLOGY

## 2021-05-20 PROCEDURE — 1126F PR PAIN SEVERITY QUANTIFIED, NO PAIN PRESENT: ICD-10-PCS | Mod: S$GLB,,, | Performed by: STUDENT IN AN ORGANIZED HEALTH CARE EDUCATION/TRAINING PROGRAM

## 2021-05-20 PROCEDURE — 3288F PR FALLS RISK ASSESSMENT DOCUMENTED: ICD-10-PCS | Mod: CPTII,S$GLB,, | Performed by: STUDENT IN AN ORGANIZED HEALTH CARE EDUCATION/TRAINING PROGRAM

## 2021-05-20 PROCEDURE — 88305 TISSUE EXAM BY PATHOLOGIST: CPT | Mod: 26,,, | Performed by: DERMATOLOGY

## 2021-05-20 PROCEDURE — 99999 PR PBB SHADOW E&M-EST. PATIENT-LVL V: ICD-10-PCS | Mod: PBBFAC,,, | Performed by: STUDENT IN AN ORGANIZED HEALTH CARE EDUCATION/TRAINING PROGRAM

## 2021-05-20 PROCEDURE — 1159F PR MEDICATION LIST DOCUMENTED IN MEDICAL RECORD: ICD-10-PCS | Mod: S$GLB,,, | Performed by: STUDENT IN AN ORGANIZED HEALTH CARE EDUCATION/TRAINING PROGRAM

## 2021-05-20 PROCEDURE — 99999 PR PBB SHADOW E&M-EST. PATIENT-LVL V: CPT | Mod: PBBFAC,,, | Performed by: STUDENT IN AN ORGANIZED HEALTH CARE EDUCATION/TRAINING PROGRAM

## 2021-05-20 PROCEDURE — 11102 TANGNTL BX SKIN SINGLE LES: CPT | Mod: S$GLB,,, | Performed by: STUDENT IN AN ORGANIZED HEALTH CARE EDUCATION/TRAINING PROGRAM

## 2021-05-20 PROCEDURE — 1101F PR PT FALLS ASSESS DOC 0-1 FALLS W/OUT INJ PAST YR: ICD-10-PCS | Mod: CPTII,S$GLB,, | Performed by: STUDENT IN AN ORGANIZED HEALTH CARE EDUCATION/TRAINING PROGRAM

## 2021-05-20 PROCEDURE — 1126F AMNT PAIN NOTED NONE PRSNT: CPT | Mod: S$GLB,,, | Performed by: STUDENT IN AN ORGANIZED HEALTH CARE EDUCATION/TRAINING PROGRAM

## 2021-05-20 PROCEDURE — 1159F MED LIST DOCD IN RCRD: CPT | Mod: S$GLB,,, | Performed by: STUDENT IN AN ORGANIZED HEALTH CARE EDUCATION/TRAINING PROGRAM

## 2021-05-20 PROCEDURE — 1101F PT FALLS ASSESS-DOCD LE1/YR: CPT | Mod: CPTII,S$GLB,, | Performed by: STUDENT IN AN ORGANIZED HEALTH CARE EDUCATION/TRAINING PROGRAM

## 2021-05-20 PROCEDURE — 1157F ADVNC CARE PLAN IN RCRD: CPT | Mod: S$GLB,,, | Performed by: STUDENT IN AN ORGANIZED HEALTH CARE EDUCATION/TRAINING PROGRAM

## 2021-05-20 PROCEDURE — 99204 PR OFFICE/OUTPT VISIT, NEW, LEVL IV, 45-59 MIN: ICD-10-PCS | Mod: 25,S$GLB,, | Performed by: STUDENT IN AN ORGANIZED HEALTH CARE EDUCATION/TRAINING PROGRAM

## 2021-05-20 PROCEDURE — 11102 PR TANGENTIAL BIOPSY, SKIN, SINGLE LESION: ICD-10-PCS | Mod: S$GLB,,, | Performed by: STUDENT IN AN ORGANIZED HEALTH CARE EDUCATION/TRAINING PROGRAM

## 2021-05-20 PROCEDURE — 3288F FALL RISK ASSESSMENT DOCD: CPT | Mod: CPTII,S$GLB,, | Performed by: STUDENT IN AN ORGANIZED HEALTH CARE EDUCATION/TRAINING PROGRAM

## 2021-05-20 RX ORDER — KETOCONAZOLE 20 MG/ML
SHAMPOO, SUSPENSION TOPICAL
Qty: 120 ML | Refills: 2 | Status: SHIPPED | OUTPATIENT
Start: 2021-05-20 | End: 2022-01-01

## 2021-05-20 RX ORDER — CLOBETASOL PROPIONATE 0.5 MG/G
CREAM TOPICAL 2 TIMES DAILY
Qty: 60 G | Refills: 1 | Status: SHIPPED | OUTPATIENT
Start: 2021-05-20 | End: 2021-01-01

## 2021-05-20 RX ORDER — FLUOCINONIDE TOPICAL SOLUTION USP, 0.05% 0.5 MG/ML
SOLUTION TOPICAL 2 TIMES DAILY
Qty: 60 ML | Refills: 1 | Status: SHIPPED | OUTPATIENT
Start: 2021-05-20 | End: 2021-01-01

## 2021-05-25 LAB
FINAL PATHOLOGIC DIAGNOSIS: NORMAL
GROSS: NORMAL
Lab: NORMAL
MICROSCOPIC EXAM: NORMAL

## 2021-05-26 ENCOUNTER — LAB VISIT (OUTPATIENT)
Dept: LAB | Facility: HOSPITAL | Age: 86
End: 2021-05-26
Attending: INTERNAL MEDICINE
Payer: MEDICARE

## 2021-05-26 DIAGNOSIS — D09.9 SQUAMOUS CELL CARCINOMA IN SITU: Primary | ICD-10-CM

## 2021-05-26 DIAGNOSIS — D64.89 ANEMIA DUE TO MULTIPLE MECHANISMS: ICD-10-CM

## 2021-05-26 DIAGNOSIS — D64.9 NORMOCYTIC NORMOCHROMIC ANEMIA: ICD-10-CM

## 2021-05-26 DIAGNOSIS — D53.9 NUTRITIONAL ANEMIA, UNSPECIFIED: ICD-10-CM

## 2021-05-26 DIAGNOSIS — D50.0 IRON DEFICIENCY ANEMIA DUE TO CHRONIC BLOOD LOSS: ICD-10-CM

## 2021-05-26 LAB
BASOPHILS # BLD AUTO: 0.03 K/UL (ref 0–0.2)
BASOPHILS NFR BLD: 0.4 % (ref 0–1.9)
DIFFERENTIAL METHOD: ABNORMAL
EOSINOPHIL # BLD AUTO: 0.2 K/UL (ref 0–0.5)
EOSINOPHIL NFR BLD: 2.4 % (ref 0–8)
ERYTHROCYTE [DISTWIDTH] IN BLOOD BY AUTOMATED COUNT: 14.6 % (ref 11.5–14.5)
HCT VFR BLD AUTO: 28.6 % (ref 37–48.5)
HGB BLD-MCNC: 9.2 G/DL (ref 12–16)
IMM GRANULOCYTES # BLD AUTO: 0.04 K/UL (ref 0–0.04)
IMM GRANULOCYTES NFR BLD AUTO: 0.6 % (ref 0–0.5)
LYMPHOCYTES # BLD AUTO: 1.1 K/UL (ref 1–4.8)
LYMPHOCYTES NFR BLD: 15.2 % (ref 18–48)
MCH RBC QN AUTO: 32.1 PG (ref 27–31)
MCHC RBC AUTO-ENTMCNC: 32.2 G/DL (ref 32–36)
MCV RBC AUTO: 100 FL (ref 82–98)
MONOCYTES # BLD AUTO: 0.7 K/UL (ref 0.3–1)
MONOCYTES NFR BLD: 9.6 % (ref 4–15)
NEUTROPHILS # BLD AUTO: 5.2 K/UL (ref 1.8–7.7)
NEUTROPHILS NFR BLD: 71.8 % (ref 38–73)
NRBC BLD-RTO: 0 /100 WBC
PLATELET # BLD AUTO: 211 K/UL (ref 150–450)
PMV BLD AUTO: 11.9 FL (ref 9.2–12.9)
RBC # BLD AUTO: 2.87 M/UL (ref 4–5.4)
WBC # BLD AUTO: 7.19 K/UL (ref 3.9–12.7)

## 2021-05-26 PROCEDURE — 82728 ASSAY OF FERRITIN: CPT | Performed by: INTERNAL MEDICINE

## 2021-05-26 PROCEDURE — 82607 VITAMIN B-12: CPT | Performed by: INTERNAL MEDICINE

## 2021-05-26 PROCEDURE — 80053 COMPREHEN METABOLIC PANEL: CPT | Performed by: INTERNAL MEDICINE

## 2021-05-26 PROCEDURE — 36415 COLL VENOUS BLD VENIPUNCTURE: CPT | Mod: PO | Performed by: INTERNAL MEDICINE

## 2021-05-26 PROCEDURE — 82746 ASSAY OF FOLIC ACID SERUM: CPT | Performed by: INTERNAL MEDICINE

## 2021-05-26 PROCEDURE — 85025 COMPLETE CBC W/AUTO DIFF WBC: CPT | Performed by: INTERNAL MEDICINE

## 2021-05-26 PROCEDURE — 83540 ASSAY OF IRON: CPT | Performed by: INTERNAL MEDICINE

## 2021-05-26 RX ORDER — FLUOROURACIL 50 MG/G
CREAM TOPICAL
Qty: 40 G | Refills: 0 | Status: SHIPPED | OUTPATIENT
Start: 2021-05-26 | End: 2021-01-01

## 2021-05-27 LAB
ALBUMIN SERPL BCP-MCNC: 3.6 G/DL (ref 3.5–5.2)
ALP SERPL-CCNC: 92 U/L (ref 55–135)
ALT SERPL W/O P-5'-P-CCNC: 12 U/L (ref 10–44)
ANION GAP SERPL CALC-SCNC: 11 MMOL/L (ref 8–16)
AST SERPL-CCNC: 18 U/L (ref 10–40)
BILIRUB SERPL-MCNC: 0.2 MG/DL (ref 0.1–1)
BUN SERPL-MCNC: 52 MG/DL (ref 8–23)
CALCIUM SERPL-MCNC: 8.9 MG/DL (ref 8.7–10.5)
CHLORIDE SERPL-SCNC: 111 MMOL/L (ref 95–110)
CO2 SERPL-SCNC: 16 MMOL/L (ref 23–29)
CREAT SERPL-MCNC: 2.2 MG/DL (ref 0.5–1.4)
EST. GFR  (AFRICAN AMERICAN): 22.1 ML/MIN/1.73 M^2
EST. GFR  (NON AFRICAN AMERICAN): 19.2 ML/MIN/1.73 M^2
FERRITIN SERPL-MCNC: 111 NG/ML (ref 20–300)
FOLATE SERPL-MCNC: 17.9 NG/ML (ref 4–24)
GLUCOSE SERPL-MCNC: 141 MG/DL (ref 70–110)
IRON SERPL-MCNC: 41 UG/DL (ref 30–160)
POTASSIUM SERPL-SCNC: 6.1 MMOL/L (ref 3.5–5.1)
PROT SERPL-MCNC: 6.6 G/DL (ref 6–8.4)
SATURATED IRON: 13 % (ref 20–50)
SODIUM SERPL-SCNC: 138 MMOL/L (ref 136–145)
TOTAL IRON BINDING CAPACITY: 311 UG/DL (ref 250–450)
TRANSFERRIN SERPL-MCNC: 210 MG/DL (ref 200–375)
VIT B12 SERPL-MCNC: 845 PG/ML (ref 210–950)

## 2021-06-01 ENCOUNTER — LAB VISIT (OUTPATIENT)
Dept: LAB | Facility: HOSPITAL | Age: 86
End: 2021-06-01
Payer: MEDICARE

## 2021-06-01 DIAGNOSIS — N25.81 SECONDARY HYPERPARATHYROIDISM OF RENAL ORIGIN: ICD-10-CM

## 2021-06-01 DIAGNOSIS — N18.30 CHRONIC KIDNEY DISEASE, STAGE III (MODERATE): Primary | ICD-10-CM

## 2021-06-01 DIAGNOSIS — D64.9 ANEMIA, UNSPECIFIED: ICD-10-CM

## 2021-06-01 DIAGNOSIS — R80.9 PROTEINURIA: ICD-10-CM

## 2021-06-01 DIAGNOSIS — E79.0 URICACIDEMIA: ICD-10-CM

## 2021-06-01 LAB
25(OH)D3+25(OH)D2 SERPL-MCNC: 43 NG/ML (ref 30–96)
BACTERIA #/AREA URNS AUTO: ABNORMAL /HPF
BASOPHILS # BLD AUTO: 0.03 K/UL (ref 0–0.2)
BASOPHILS NFR BLD: 0.5 % (ref 0–1.9)
BILIRUB UR QL STRIP: NEGATIVE
CLARITY UR REFRACT.AUTO: ABNORMAL
COLOR UR AUTO: YELLOW
DIFFERENTIAL METHOD: ABNORMAL
EOSINOPHIL # BLD AUTO: 0.2 K/UL (ref 0–0.5)
EOSINOPHIL NFR BLD: 2.5 % (ref 0–8)
ERYTHROCYTE [DISTWIDTH] IN BLOOD BY AUTOMATED COUNT: 14.2 % (ref 11.5–14.5)
GLUCOSE UR QL STRIP: NEGATIVE
HCT VFR BLD AUTO: 29.5 % (ref 37–48.5)
HGB BLD-MCNC: 9.1 G/DL (ref 12–16)
HGB UR QL STRIP: NEGATIVE
HYALINE CASTS UR QL AUTO: 11 /LPF
IMM GRANULOCYTES # BLD AUTO: 0.02 K/UL (ref 0–0.04)
IMM GRANULOCYTES NFR BLD AUTO: 0.3 % (ref 0–0.5)
KETONES UR QL STRIP: NEGATIVE
LEUKOCYTE ESTERASE UR QL STRIP: ABNORMAL
LYMPHOCYTES # BLD AUTO: 1 K/UL (ref 1–4.8)
LYMPHOCYTES NFR BLD: 16.4 % (ref 18–48)
MCH RBC QN AUTO: 31.6 PG (ref 27–31)
MCHC RBC AUTO-ENTMCNC: 30.8 G/DL (ref 32–36)
MCV RBC AUTO: 102 FL (ref 82–98)
MICROSCOPIC COMMENT: ABNORMAL
MONOCYTES # BLD AUTO: 0.5 K/UL (ref 0.3–1)
MONOCYTES NFR BLD: 8.2 % (ref 4–15)
NEUTROPHILS # BLD AUTO: 4.3 K/UL (ref 1.8–7.7)
NEUTROPHILS NFR BLD: 72.1 % (ref 38–73)
NITRITE UR QL STRIP: NEGATIVE
NON-SQ EPI CELLS #/AREA URNS AUTO: 1 /HPF
NRBC BLD-RTO: 0 /100 WBC
PH UR STRIP: 5 [PH] (ref 5–8)
PLATELET # BLD AUTO: 205 K/UL (ref 150–450)
PMV BLD AUTO: 11.5 FL (ref 9.2–12.9)
PROT UR QL STRIP: ABNORMAL
PTH-INTACT SERPL-MCNC: 93 PG/ML (ref 9–77)
RBC # BLD AUTO: 2.88 M/UL (ref 4–5.4)
RBC #/AREA URNS AUTO: 10 /HPF (ref 0–4)
SP GR UR STRIP: 1.02 (ref 1–1.03)
SQUAMOUS #/AREA URNS AUTO: 5 /HPF
URN SPEC COLLECT METH UR: ABNORMAL
WBC # BLD AUTO: 5.96 K/UL (ref 3.9–12.7)
WBC #/AREA URNS AUTO: >100 /HPF (ref 0–5)
WBC CLUMPS UR QL AUTO: ABNORMAL

## 2021-06-01 PROCEDURE — 85025 COMPLETE CBC W/AUTO DIFF WBC: CPT | Performed by: INTERNAL MEDICINE

## 2021-06-01 PROCEDURE — 80069 RENAL FUNCTION PANEL: CPT | Performed by: INTERNAL MEDICINE

## 2021-06-01 PROCEDURE — 83970 ASSAY OF PARATHORMONE: CPT | Performed by: INTERNAL MEDICINE

## 2021-06-01 PROCEDURE — 82306 VITAMIN D 25 HYDROXY: CPT | Performed by: INTERNAL MEDICINE

## 2021-06-01 PROCEDURE — 84550 ASSAY OF BLOOD/URIC ACID: CPT | Performed by: INTERNAL MEDICINE

## 2021-06-01 PROCEDURE — 36415 COLL VENOUS BLD VENIPUNCTURE: CPT | Mod: PO | Performed by: INTERNAL MEDICINE

## 2021-06-01 PROCEDURE — 81001 URINALYSIS AUTO W/SCOPE: CPT | Performed by: INTERNAL MEDICINE

## 2021-06-01 PROCEDURE — 84156 ASSAY OF PROTEIN URINE: CPT | Performed by: INTERNAL MEDICINE

## 2021-06-02 ENCOUNTER — HOSPITAL ENCOUNTER (OUTPATIENT)
Dept: CARDIOLOGY | Facility: CLINIC | Age: 86
Discharge: HOME | End: 2021-06-02
Attending: INTERNAL MEDICINE
Payer: MEDICARE

## 2021-06-02 DIAGNOSIS — Z95.818 PRESENCE OF OTHER CARDIAC IMPLANTS AND GRAFTS: ICD-10-CM

## 2021-06-02 DIAGNOSIS — I51.89 LEFT VENTRICULAR DIASTOLIC DYSFUNCTION WITH PRESERVED SYSTOLIC FUNCTION: ICD-10-CM

## 2021-06-02 LAB
ALBUMIN SERPL BCP-MCNC: 3.5 G/DL (ref 3.5–5.2)
ANION GAP SERPL CALC-SCNC: 11 MMOL/L (ref 8–16)
BUN SERPL-MCNC: 45 MG/DL (ref 8–23)
CALCIUM SERPL-MCNC: 8.9 MG/DL (ref 8.7–10.5)
CHLORIDE SERPL-SCNC: 108 MMOL/L (ref 95–110)
CO2 SERPL-SCNC: 18 MMOL/L (ref 23–29)
CREAT SERPL-MCNC: 2 MG/DL (ref 0.5–1.4)
CREAT UR-MCNC: 209 MG/DL (ref 15–325)
EST. GFR  (AFRICAN AMERICAN): 24.8 ML/MIN/1.73 M^2
EST. GFR  (NON AFRICAN AMERICAN): 21.5 ML/MIN/1.73 M^2
GLUCOSE SERPL-MCNC: 148 MG/DL (ref 70–110)
PHOSPHATE SERPL-MCNC: 4.4 MG/DL (ref 2.7–4.5)
POTASSIUM SERPL-SCNC: 5.6 MMOL/L (ref 3.5–5.1)
PROT UR-MCNC: 178 MG/DL (ref 0–15)
PROT/CREAT UR: 0.85 MG/G{CREAT} (ref 0–0.2)
SODIUM SERPL-SCNC: 137 MMOL/L (ref 136–145)
URATE SERPL-MCNC: 3.7 MG/DL (ref 2.4–5.7)

## 2021-06-02 PROCEDURE — 93298 CARDIAC DEVICE CHECK - REMOTE: ICD-10-PCS | Mod: S$GLB,,, | Performed by: INTERNAL MEDICINE

## 2021-06-02 PROCEDURE — 93298 REM INTERROG DEV EVAL SCRMS: CPT | Mod: S$GLB,,, | Performed by: INTERNAL MEDICINE

## 2021-06-11 PROBLEM — D63.1 ANEMIA DUE TO STAGE 3 CHRONIC KIDNEY DISEASE TREATED WITH ERYTHROPOIETIN: Status: ACTIVE | Noted: 2021-06-11

## 2021-06-11 PROBLEM — D50.9 IRON DEFICIENCY ANEMIA, UNSPECIFIED: Status: ACTIVE | Noted: 2021-06-11

## 2021-06-11 PROBLEM — N18.30 ANEMIA DUE TO STAGE 3 CHRONIC KIDNEY DISEASE TREATED WITH ERYTHROPOIETIN: Status: ACTIVE | Noted: 2021-06-11

## 2021-06-15 ENCOUNTER — INFUSION (OUTPATIENT)
Dept: INFUSION THERAPY | Facility: HOSPITAL | Age: 86
End: 2021-06-15
Attending: INTERNAL MEDICINE
Payer: MEDICARE

## 2021-06-15 VITALS
SYSTOLIC BLOOD PRESSURE: 158 MMHG | RESPIRATION RATE: 18 BRPM | OXYGEN SATURATION: 98 % | WEIGHT: 129.88 LBS | DIASTOLIC BLOOD PRESSURE: 66 MMHG | BODY MASS INDEX: 23.01 KG/M2 | HEART RATE: 62 BPM | TEMPERATURE: 98 F

## 2021-06-15 DIAGNOSIS — N18.30 ANEMIA DUE TO STAGE 3 CHRONIC KIDNEY DISEASE TREATED WITH ERYTHROPOIETIN: ICD-10-CM

## 2021-06-15 DIAGNOSIS — D63.1 ANEMIA DUE TO STAGE 3 CHRONIC KIDNEY DISEASE TREATED WITH ERYTHROPOIETIN: ICD-10-CM

## 2021-06-15 DIAGNOSIS — D50.9 IRON DEFICIENCY ANEMIA, UNSPECIFIED IRON DEFICIENCY ANEMIA TYPE: Primary | ICD-10-CM

## 2021-06-15 PROCEDURE — 63600175 PHARM REV CODE 636 W HCPCS: Mod: JG | Performed by: INTERNAL MEDICINE

## 2021-06-15 PROCEDURE — 96365 THER/PROPH/DIAG IV INF INIT: CPT

## 2021-06-15 PROCEDURE — 25000003 PHARM REV CODE 250: Performed by: INTERNAL MEDICINE

## 2021-06-15 RX ADMIN — FERUMOXYTOL 510 MG: 510 INJECTION INTRAVENOUS at 03:06

## 2021-06-20 ENCOUNTER — PATIENT OUTREACH (OUTPATIENT)
Dept: ADMINISTRATIVE | Facility: OTHER | Age: 86
End: 2021-06-20

## 2021-06-20 DIAGNOSIS — E11.22 TYPE 2 DIABETES MELLITUS WITH STAGE 4 CHRONIC KIDNEY DISEASE, WITHOUT LONG-TERM CURRENT USE OF INSULIN: Primary | ICD-10-CM

## 2021-06-20 DIAGNOSIS — N18.4 TYPE 2 DIABETES MELLITUS WITH STAGE 4 CHRONIC KIDNEY DISEASE, WITHOUT LONG-TERM CURRENT USE OF INSULIN: Primary | ICD-10-CM

## 2021-06-21 ENCOUNTER — OFFICE VISIT (OUTPATIENT)
Dept: DERMATOLOGY | Facility: CLINIC | Age: 86
End: 2021-06-21
Payer: MEDICARE

## 2021-06-21 ENCOUNTER — INFUSION (OUTPATIENT)
Dept: INFUSION THERAPY | Facility: HOSPITAL | Age: 86
End: 2021-06-21
Attending: INTERNAL MEDICINE
Payer: MEDICARE

## 2021-06-21 VITALS
HEART RATE: 54 BPM | SYSTOLIC BLOOD PRESSURE: 143 MMHG | WEIGHT: 129.38 LBS | TEMPERATURE: 98 F | DIASTOLIC BLOOD PRESSURE: 62 MMHG | RESPIRATION RATE: 18 BRPM | BODY MASS INDEX: 22.92 KG/M2 | OXYGEN SATURATION: 97 %

## 2021-06-21 DIAGNOSIS — L29.9 SCALP PRURITUS: ICD-10-CM

## 2021-06-21 DIAGNOSIS — R21 RASH: ICD-10-CM

## 2021-06-21 DIAGNOSIS — N18.30 ANEMIA DUE TO STAGE 3 CHRONIC KIDNEY DISEASE TREATED WITH ERYTHROPOIETIN: ICD-10-CM

## 2021-06-21 DIAGNOSIS — D50.9 IRON DEFICIENCY ANEMIA, UNSPECIFIED IRON DEFICIENCY ANEMIA TYPE: Primary | ICD-10-CM

## 2021-06-21 DIAGNOSIS — D09.9 SQUAMOUS CELL CARCINOMA IN SITU: Primary | ICD-10-CM

## 2021-06-21 DIAGNOSIS — L82.1 SEBORRHEIC KERATOSES: ICD-10-CM

## 2021-06-21 DIAGNOSIS — L57.8 ACTINIC SKIN DAMAGE: ICD-10-CM

## 2021-06-21 DIAGNOSIS — D22.9 MULTIPLE BENIGN NEVI: ICD-10-CM

## 2021-06-21 DIAGNOSIS — D63.1 ANEMIA DUE TO STAGE 3 CHRONIC KIDNEY DISEASE TREATED WITH ERYTHROPOIETIN: ICD-10-CM

## 2021-06-21 PROCEDURE — 99999 PR PBB SHADOW E&M-EST. PATIENT-LVL IV: ICD-10-PCS | Mod: PBBFAC,,, | Performed by: STUDENT IN AN ORGANIZED HEALTH CARE EDUCATION/TRAINING PROGRAM

## 2021-06-21 PROCEDURE — 1126F PR PAIN SEVERITY QUANTIFIED, NO PAIN PRESENT: ICD-10-PCS | Mod: S$GLB,,, | Performed by: STUDENT IN AN ORGANIZED HEALTH CARE EDUCATION/TRAINING PROGRAM

## 2021-06-21 PROCEDURE — 96365 THER/PROPH/DIAG IV INF INIT: CPT

## 2021-06-21 PROCEDURE — 99999 PR PBB SHADOW E&M-EST. PATIENT-LVL IV: CPT | Mod: PBBFAC,,, | Performed by: STUDENT IN AN ORGANIZED HEALTH CARE EDUCATION/TRAINING PROGRAM

## 2021-06-21 PROCEDURE — 1157F ADVNC CARE PLAN IN RCRD: CPT | Mod: S$GLB,,, | Performed by: STUDENT IN AN ORGANIZED HEALTH CARE EDUCATION/TRAINING PROGRAM

## 2021-06-21 PROCEDURE — 87070 CULTURE OTHR SPECIMN AEROBIC: CPT | Performed by: STUDENT IN AN ORGANIZED HEALTH CARE EDUCATION/TRAINING PROGRAM

## 2021-06-21 PROCEDURE — 1159F PR MEDICATION LIST DOCUMENTED IN MEDICAL RECORD: ICD-10-PCS | Mod: S$GLB,,, | Performed by: STUDENT IN AN ORGANIZED HEALTH CARE EDUCATION/TRAINING PROGRAM

## 2021-06-21 PROCEDURE — 1159F MED LIST DOCD IN RCRD: CPT | Mod: S$GLB,,, | Performed by: STUDENT IN AN ORGANIZED HEALTH CARE EDUCATION/TRAINING PROGRAM

## 2021-06-21 PROCEDURE — 1157F PR ADVANCE CARE PLAN OR EQUIV PRESENT IN MEDICAL RECORD: ICD-10-PCS | Mod: S$GLB,,, | Performed by: STUDENT IN AN ORGANIZED HEALTH CARE EDUCATION/TRAINING PROGRAM

## 2021-06-21 PROCEDURE — 1126F AMNT PAIN NOTED NONE PRSNT: CPT | Mod: S$GLB,,, | Performed by: STUDENT IN AN ORGANIZED HEALTH CARE EDUCATION/TRAINING PROGRAM

## 2021-06-21 PROCEDURE — 3288F PR FALLS RISK ASSESSMENT DOCUMENTED: ICD-10-PCS | Mod: CPTII,S$GLB,, | Performed by: STUDENT IN AN ORGANIZED HEALTH CARE EDUCATION/TRAINING PROGRAM

## 2021-06-21 PROCEDURE — 99213 PR OFFICE/OUTPT VISIT, EST, LEVL III, 20-29 MIN: ICD-10-PCS | Mod: S$GLB,,, | Performed by: STUDENT IN AN ORGANIZED HEALTH CARE EDUCATION/TRAINING PROGRAM

## 2021-06-21 PROCEDURE — 3288F FALL RISK ASSESSMENT DOCD: CPT | Mod: CPTII,S$GLB,, | Performed by: STUDENT IN AN ORGANIZED HEALTH CARE EDUCATION/TRAINING PROGRAM

## 2021-06-21 PROCEDURE — 1101F PT FALLS ASSESS-DOCD LE1/YR: CPT | Mod: CPTII,S$GLB,, | Performed by: STUDENT IN AN ORGANIZED HEALTH CARE EDUCATION/TRAINING PROGRAM

## 2021-06-21 PROCEDURE — 25000003 PHARM REV CODE 250: Performed by: INTERNAL MEDICINE

## 2021-06-21 PROCEDURE — 99213 OFFICE O/P EST LOW 20 MIN: CPT | Mod: S$GLB,,, | Performed by: STUDENT IN AN ORGANIZED HEALTH CARE EDUCATION/TRAINING PROGRAM

## 2021-06-21 PROCEDURE — 63600175 PHARM REV CODE 636 W HCPCS: Mod: JG | Performed by: INTERNAL MEDICINE

## 2021-06-21 PROCEDURE — 87077 CULTURE AEROBIC IDENTIFY: CPT | Mod: 59 | Performed by: STUDENT IN AN ORGANIZED HEALTH CARE EDUCATION/TRAINING PROGRAM

## 2021-06-21 PROCEDURE — 87186 SC STD MICRODIL/AGAR DIL: CPT | Mod: 59 | Performed by: STUDENT IN AN ORGANIZED HEALTH CARE EDUCATION/TRAINING PROGRAM

## 2021-06-21 PROCEDURE — 1101F PR PT FALLS ASSESS DOC 0-1 FALLS W/OUT INJ PAST YR: ICD-10-PCS | Mod: CPTII,S$GLB,, | Performed by: STUDENT IN AN ORGANIZED HEALTH CARE EDUCATION/TRAINING PROGRAM

## 2021-06-21 RX ORDER — GLIPIZIDE 5 MG/1
TABLET ORAL
COMMUNITY
End: 2021-09-14

## 2021-06-21 RX ORDER — MUPIROCIN 20 MG/G
OINTMENT TOPICAL 2 TIMES DAILY
Qty: 22 G | Refills: 0 | Status: SHIPPED | OUTPATIENT
Start: 2021-06-21 | End: 2021-08-26 | Stop reason: ALTCHOICE

## 2021-06-21 RX ADMIN — FERUMOXYTOL 510 MG: 510 INJECTION INTRAVENOUS at 03:06

## 2021-06-23 ENCOUNTER — LAB VISIT (OUTPATIENT)
Dept: LAB | Facility: HOSPITAL | Age: 86
End: 2021-06-23
Attending: FAMILY MEDICINE
Payer: MEDICARE

## 2021-06-23 DIAGNOSIS — D64.89 ANEMIA DUE TO MULTIPLE MECHANISMS: ICD-10-CM

## 2021-06-23 DIAGNOSIS — N18.4 TYPE 2 DIABETES MELLITUS WITH STAGE 4 CHRONIC KIDNEY DISEASE, WITHOUT LONG-TERM CURRENT USE OF INSULIN: ICD-10-CM

## 2021-06-23 DIAGNOSIS — R19.7 DIARRHEA, UNSPECIFIED TYPE: ICD-10-CM

## 2021-06-23 DIAGNOSIS — D50.0 IRON DEFICIENCY ANEMIA DUE TO CHRONIC BLOOD LOSS: ICD-10-CM

## 2021-06-23 DIAGNOSIS — E11.22 TYPE 2 DIABETES MELLITUS WITH STAGE 4 CHRONIC KIDNEY DISEASE, WITHOUT LONG-TERM CURRENT USE OF INSULIN: ICD-10-CM

## 2021-06-23 DIAGNOSIS — D53.9 NUTRITIONAL ANEMIA, UNSPECIFIED: ICD-10-CM

## 2021-06-23 DIAGNOSIS — D64.9 NORMOCYTIC NORMOCHROMIC ANEMIA: ICD-10-CM

## 2021-06-23 LAB
BASOPHILS # BLD AUTO: 0.03 K/UL (ref 0–0.2)
BASOPHILS NFR BLD: 0.4 % (ref 0–1.9)
DIFFERENTIAL METHOD: ABNORMAL
EOSINOPHIL # BLD AUTO: 0.1 K/UL (ref 0–0.5)
EOSINOPHIL NFR BLD: 1.8 % (ref 0–8)
ERYTHROCYTE [DISTWIDTH] IN BLOOD BY AUTOMATED COUNT: 14.6 % (ref 11.5–14.5)
HCT VFR BLD AUTO: 29.6 % (ref 37–48.5)
HGB BLD-MCNC: 9.5 G/DL (ref 12–16)
IMM GRANULOCYTES # BLD AUTO: 0.04 K/UL (ref 0–0.04)
IMM GRANULOCYTES NFR BLD AUTO: 0.6 % (ref 0–0.5)
LYMPHOCYTES # BLD AUTO: 1 K/UL (ref 1–4.8)
LYMPHOCYTES NFR BLD: 14.5 % (ref 18–48)
MCH RBC QN AUTO: 32.2 PG (ref 27–31)
MCHC RBC AUTO-ENTMCNC: 32.1 G/DL (ref 32–36)
MCV RBC AUTO: 100 FL (ref 82–98)
MONOCYTES # BLD AUTO: 0.7 K/UL (ref 0.3–1)
MONOCYTES NFR BLD: 10.1 % (ref 4–15)
NEUTROPHILS # BLD AUTO: 5.2 K/UL (ref 1.8–7.7)
NEUTROPHILS NFR BLD: 72.6 % (ref 38–73)
NRBC BLD-RTO: 0 /100 WBC
PLATELET # BLD AUTO: 215 K/UL (ref 150–450)
PMV BLD AUTO: 11.2 FL (ref 9.2–12.9)
RBC # BLD AUTO: 2.95 M/UL (ref 4–5.4)
WBC # BLD AUTO: 7.12 K/UL (ref 3.9–12.7)

## 2021-06-23 PROCEDURE — 85025 COMPLETE CBC W/AUTO DIFF WBC: CPT | Performed by: INTERNAL MEDICINE

## 2021-06-23 PROCEDURE — 83540 ASSAY OF IRON: CPT | Performed by: INTERNAL MEDICINE

## 2021-06-23 PROCEDURE — 82607 VITAMIN B-12: CPT | Performed by: INTERNAL MEDICINE

## 2021-06-23 PROCEDURE — 80053 COMPREHEN METABOLIC PANEL: CPT | Performed by: INTERNAL MEDICINE

## 2021-06-23 PROCEDURE — 83036 HEMOGLOBIN GLYCOSYLATED A1C: CPT | Performed by: FAMILY MEDICINE

## 2021-06-23 PROCEDURE — 36415 COLL VENOUS BLD VENIPUNCTURE: CPT | Mod: PO | Performed by: INTERNAL MEDICINE

## 2021-06-23 PROCEDURE — 82728 ASSAY OF FERRITIN: CPT | Performed by: INTERNAL MEDICINE

## 2021-06-23 PROCEDURE — 82746 ASSAY OF FOLIC ACID SERUM: CPT | Performed by: INTERNAL MEDICINE

## 2021-06-24 LAB
ALBUMIN SERPL BCP-MCNC: 3.5 G/DL (ref 3.5–5.2)
ALP SERPL-CCNC: 80 U/L (ref 55–135)
ALT SERPL W/O P-5'-P-CCNC: 11 U/L (ref 10–44)
ANION GAP SERPL CALC-SCNC: 10 MMOL/L (ref 8–16)
AST SERPL-CCNC: 19 U/L (ref 10–40)
BILIRUB SERPL-MCNC: 0.2 MG/DL (ref 0.1–1)
BUN SERPL-MCNC: 36 MG/DL (ref 8–23)
CALCIUM SERPL-MCNC: 9.1 MG/DL (ref 8.7–10.5)
CHLORIDE SERPL-SCNC: 111 MMOL/L (ref 95–110)
CO2 SERPL-SCNC: 17 MMOL/L (ref 23–29)
CREAT SERPL-MCNC: 1.6 MG/DL (ref 0.5–1.4)
EST. GFR  (AFRICAN AMERICAN): 32.5 ML/MIN/1.73 M^2
EST. GFR  (NON AFRICAN AMERICAN): 28.2 ML/MIN/1.73 M^2
ESTIMATED AVG GLUCOSE: 114 MG/DL (ref 68–131)
FERRITIN SERPL-MCNC: 895 NG/ML (ref 20–300)
FOLATE SERPL-MCNC: 19.1 NG/ML (ref 4–24)
GLUCOSE SERPL-MCNC: 71 MG/DL (ref 70–110)
HBA1C MFR BLD: 5.6 % (ref 4–5.6)
IRON SERPL-MCNC: 598 UG/DL (ref 30–160)
POTASSIUM SERPL-SCNC: 4.7 MMOL/L (ref 3.5–5.1)
PROT SERPL-MCNC: 6.4 G/DL (ref 6–8.4)
SATURATED IRON: 234 % (ref 20–50)
SODIUM SERPL-SCNC: 138 MMOL/L (ref 136–145)
TOTAL IRON BINDING CAPACITY: 256 UG/DL (ref 250–450)
TRANSFERRIN SERPL-MCNC: 173 MG/DL (ref 200–375)
VIT B12 SERPL-MCNC: 720 PG/ML (ref 210–950)

## 2021-06-25 ENCOUNTER — LAB VISIT (OUTPATIENT)
Dept: LAB | Facility: HOSPITAL | Age: 86
End: 2021-06-25
Attending: PHYSICIAN ASSISTANT
Payer: MEDICARE

## 2021-06-25 DIAGNOSIS — R19.7 DIARRHEA, UNSPECIFIED TYPE: ICD-10-CM

## 2021-06-25 PROCEDURE — 87045 FECES CULTURE AEROBIC BACT: CPT | Performed by: PHYSICIAN ASSISTANT

## 2021-06-25 PROCEDURE — 87209 SMEAR COMPLEX STAIN: CPT | Performed by: PHYSICIAN ASSISTANT

## 2021-06-25 PROCEDURE — 87046 STOOL CULTR AEROBIC BACT EA: CPT | Performed by: PHYSICIAN ASSISTANT

## 2021-06-25 PROCEDURE — 89055 LEUKOCYTE ASSESSMENT FECAL: CPT | Performed by: PHYSICIAN ASSISTANT

## 2021-06-25 PROCEDURE — 87427 SHIGA-LIKE TOXIN AG IA: CPT | Performed by: PHYSICIAN ASSISTANT

## 2021-06-26 LAB — WBC #/AREA STL HPF: NORMAL /[HPF]

## 2021-06-27 LAB
BACTERIA SPEC AEROBE CULT: ABNORMAL
BACTERIA SPEC AEROBE CULT: ABNORMAL
E COLI SXT1 STL QL IA: NEGATIVE
E COLI SXT2 STL QL IA: NEGATIVE

## 2021-06-28 ENCOUNTER — TELEPHONE (OUTPATIENT)
Dept: DERMATOLOGY | Facility: CLINIC | Age: 86
End: 2021-06-28

## 2021-06-28 DIAGNOSIS — L08.9 INFECTION, SKIN: Primary | ICD-10-CM

## 2021-06-28 RX ORDER — DOXYCYCLINE 100 MG/1
100 CAPSULE ORAL 2 TIMES DAILY
Qty: 14 CAPSULE | Refills: 0 | Status: ON HOLD | OUTPATIENT
Start: 2021-06-28 | End: 2021-07-08 | Stop reason: HOSPADM

## 2021-06-29 LAB
BACTERIA STL CULT: NORMAL
O+P STL MICRO: NORMAL

## 2021-06-29 RX ORDER — SERTRALINE HYDROCHLORIDE 25 MG/1
25 TABLET, FILM COATED ORAL DAILY
Qty: 90 TABLET | Refills: 3 | Status: SHIPPED | OUTPATIENT
Start: 2021-06-29 | End: 2022-01-01

## 2021-07-02 ENCOUNTER — TELEPHONE (OUTPATIENT)
Dept: FAMILY MEDICINE | Facility: CLINIC | Age: 86
End: 2021-07-02

## 2021-07-04 ENCOUNTER — HOSPITAL ENCOUNTER (INPATIENT)
Facility: HOSPITAL | Age: 86
LOS: 5 days | Discharge: NURSING, REHAB OR LONG-TERM CARE | DRG: 536 | End: 2021-07-09
Attending: EMERGENCY MEDICINE | Admitting: INTERNAL MEDICINE
Payer: MEDICARE

## 2021-07-04 DIAGNOSIS — S72.91XA CLOSED FRACTURE OF RIGHT FEMUR, UNSPECIFIED FRACTURE MORPHOLOGY, UNSPECIFIED PORTION OF FEMUR, INITIAL ENCOUNTER: ICD-10-CM

## 2021-07-04 DIAGNOSIS — S72.001A CLOSED RIGHT HIP FRACTURE: ICD-10-CM

## 2021-07-04 DIAGNOSIS — R53.1 WEAKNESS: ICD-10-CM

## 2021-07-04 DIAGNOSIS — R52 PAIN: ICD-10-CM

## 2021-07-04 DIAGNOSIS — R55 NEAR SYNCOPE: Primary | ICD-10-CM

## 2021-07-04 LAB
ALBUMIN SERPL BCP-MCNC: 3.5 G/DL (ref 3.5–5.2)
ALP SERPL-CCNC: 75 U/L (ref 55–135)
ALT SERPL W/O P-5'-P-CCNC: 18 U/L (ref 10–44)
AMORPH CRY URNS QL MICRO: ABNORMAL
AMPHET+METHAMPHET UR QL: NEGATIVE
ANION GAP SERPL CALC-SCNC: 11 MMOL/L (ref 8–16)
APTT PPP: 40.8 SEC (ref 25.6–35.8)
AST SERPL-CCNC: 26 U/L (ref 10–40)
BACTERIA #/AREA URNS HPF: ABNORMAL /HPF
BARBITURATES UR QL SCN>200 NG/ML: NEGATIVE
BASOPHILS # BLD AUTO: 0.04 K/UL (ref 0–0.2)
BASOPHILS NFR BLD: 0.4 % (ref 0–1.9)
BENZODIAZ UR QL SCN>200 NG/ML: NEGATIVE
BILIRUB SERPL-MCNC: 0.8 MG/DL (ref 0.1–1)
BILIRUB UR QL STRIP: NEGATIVE
BUN SERPL-MCNC: 55 MG/DL (ref 8–23)
BZE UR QL SCN: NEGATIVE
CALCIUM SERPL-MCNC: 8.4 MG/DL (ref 8.7–10.5)
CANNABINOIDS UR QL SCN: NEGATIVE
CHLORIDE SERPL-SCNC: 105 MMOL/L (ref 95–110)
CK MB SERPL-MCNC: 4.3 NG/ML (ref 0.1–6.5)
CK SERPL-CCNC: 183 U/L (ref 20–180)
CLARITY UR: CLEAR
CO2 SERPL-SCNC: 22 MMOL/L (ref 23–29)
COLOR UR: YELLOW
CREAT SERPL-MCNC: 2.2 MG/DL (ref 0.5–1.4)
CREAT UR-MCNC: 220 MG/DL (ref 15–325)
DIFFERENTIAL METHOD: ABNORMAL
EOSINOPHIL # BLD AUTO: 0.2 K/UL (ref 0–0.5)
EOSINOPHIL NFR BLD: 2.5 % (ref 0–8)
ERYTHROCYTE [DISTWIDTH] IN BLOOD BY AUTOMATED COUNT: 14.9 % (ref 11.5–14.5)
EST. GFR  (AFRICAN AMERICAN): 22.1 ML/MIN/1.73 M^2
EST. GFR  (NON AFRICAN AMERICAN): 19.2 ML/MIN/1.73 M^2
GLUCOSE SERPL-MCNC: 115 MG/DL (ref 70–110)
GLUCOSE SERPL-MCNC: 124 MG/DL (ref 70–110)
GLUCOSE UR QL STRIP: NEGATIVE
HCT VFR BLD AUTO: 27.2 % (ref 37–48.5)
HGB BLD-MCNC: 9 G/DL (ref 12–16)
HGB UR QL STRIP: NEGATIVE
HYALINE CASTS #/AREA URNS LPF: 105 /LPF
IMM GRANULOCYTES # BLD AUTO: 0.1 K/UL (ref 0–0.04)
IMM GRANULOCYTES NFR BLD AUTO: 1 % (ref 0–0.5)
INR PPP: 2
KETONES UR QL STRIP: ABNORMAL
LEUKOCYTE ESTERASE UR QL STRIP: NEGATIVE
LIPASE SERPL-CCNC: 31 U/L (ref 4–60)
LYMPHOCYTES # BLD AUTO: 1.2 K/UL (ref 1–4.8)
LYMPHOCYTES NFR BLD: 12.6 % (ref 18–48)
MAGNESIUM SERPL-MCNC: 2 MG/DL (ref 1.6–2.6)
MCH RBC QN AUTO: 33 PG (ref 27–31)
MCHC RBC AUTO-ENTMCNC: 33.1 G/DL (ref 32–36)
MCV RBC AUTO: 100 FL (ref 82–98)
MICROSCOPIC COMMENT: ABNORMAL
MONOCYTES # BLD AUTO: 0.9 K/UL (ref 0.3–1)
MONOCYTES NFR BLD: 9.4 % (ref 4–15)
NEUTROPHILS # BLD AUTO: 7.1 K/UL (ref 1.8–7.7)
NEUTROPHILS NFR BLD: 74.1 % (ref 38–73)
NITRITE UR QL STRIP: NEGATIVE
NRBC BLD-RTO: 0 /100 WBC
OPIATES UR QL SCN: ABNORMAL
PCP UR QL SCN>25 NG/ML: NEGATIVE
PH UR STRIP: 5 [PH] (ref 5–8)
PLATELET # BLD AUTO: 169 K/UL (ref 150–450)
PMV BLD AUTO: 10.9 FL (ref 9.2–12.9)
POTASSIUM SERPL-SCNC: 4.1 MMOL/L (ref 3.5–5.1)
PROT SERPL-MCNC: 6 G/DL (ref 6–8.4)
PROT UR QL STRIP: ABNORMAL
PROTHROMBIN TIME: 21.6 SEC (ref 11.8–14.3)
RBC # BLD AUTO: 2.73 M/UL (ref 4–5.4)
RBC #/AREA URNS HPF: 1 /HPF (ref 0–4)
SODIUM SERPL-SCNC: 138 MMOL/L (ref 136–145)
SP GR UR STRIP: 1.02 (ref 1–1.03)
SQUAMOUS #/AREA URNS HPF: 7 /HPF
T4 FREE SERPL-MCNC: 0.58 NG/DL (ref 0.71–1.51)
TOXICOLOGY INFORMATION: ABNORMAL
TROPONIN I SERPL DL<=0.01 NG/ML-MCNC: <0.03 NG/ML
TSH SERPL DL<=0.005 MIU/L-ACNC: 5.98 UIU/ML (ref 0.34–5.6)
URN SPEC COLLECT METH UR: ABNORMAL
UROBILINOGEN UR STRIP-ACNC: NEGATIVE EU/DL
WBC # BLD AUTO: 9.6 K/UL (ref 3.9–12.7)
WBC #/AREA URNS HPF: 5 /HPF (ref 0–5)

## 2021-07-04 PROCEDURE — 36415 COLL VENOUS BLD VENIPUNCTURE: CPT | Performed by: EMERGENCY MEDICINE

## 2021-07-04 PROCEDURE — 93010 EKG 12-LEAD: ICD-10-PCS | Mod: ,,, | Performed by: INTERNAL MEDICINE

## 2021-07-04 PROCEDURE — 83735 ASSAY OF MAGNESIUM: CPT | Performed by: EMERGENCY MEDICINE

## 2021-07-04 PROCEDURE — 82553 CREATINE MB FRACTION: CPT | Performed by: EMERGENCY MEDICINE

## 2021-07-04 PROCEDURE — 93005 ELECTROCARDIOGRAM TRACING: CPT | Performed by: INTERNAL MEDICINE

## 2021-07-04 PROCEDURE — 85025 COMPLETE CBC W/AUTO DIFF WBC: CPT | Performed by: EMERGENCY MEDICINE

## 2021-07-04 PROCEDURE — 82962 GLUCOSE BLOOD TEST: CPT

## 2021-07-04 PROCEDURE — 99285 EMERGENCY DEPT VISIT HI MDM: CPT

## 2021-07-04 PROCEDURE — 84443 ASSAY THYROID STIM HORMONE: CPT | Performed by: EMERGENCY MEDICINE

## 2021-07-04 PROCEDURE — 84484 ASSAY OF TROPONIN QUANT: CPT | Performed by: EMERGENCY MEDICINE

## 2021-07-04 PROCEDURE — 25000003 PHARM REV CODE 250: Performed by: EMERGENCY MEDICINE

## 2021-07-04 PROCEDURE — 80307 DRUG TEST PRSMV CHEM ANLYZR: CPT | Performed by: EMERGENCY MEDICINE

## 2021-07-04 PROCEDURE — 83690 ASSAY OF LIPASE: CPT | Performed by: EMERGENCY MEDICINE

## 2021-07-04 PROCEDURE — 82550 ASSAY OF CK (CPK): CPT | Performed by: EMERGENCY MEDICINE

## 2021-07-04 PROCEDURE — 80053 COMPREHEN METABOLIC PANEL: CPT | Performed by: EMERGENCY MEDICINE

## 2021-07-04 PROCEDURE — 84439 ASSAY OF FREE THYROXINE: CPT | Performed by: EMERGENCY MEDICINE

## 2021-07-04 PROCEDURE — 85730 THROMBOPLASTIN TIME PARTIAL: CPT | Performed by: EMERGENCY MEDICINE

## 2021-07-04 PROCEDURE — 81001 URINALYSIS AUTO W/SCOPE: CPT | Performed by: EMERGENCY MEDICINE

## 2021-07-04 PROCEDURE — 51701 INSERT BLADDER CATHETER: CPT

## 2021-07-04 PROCEDURE — 12000002 HC ACUTE/MED SURGE SEMI-PRIVATE ROOM

## 2021-07-04 PROCEDURE — 85610 PROTHROMBIN TIME: CPT | Performed by: EMERGENCY MEDICINE

## 2021-07-04 PROCEDURE — 93010 ELECTROCARDIOGRAM REPORT: CPT | Mod: ,,, | Performed by: INTERNAL MEDICINE

## 2021-07-04 RX ORDER — LANOLIN ALCOHOL/MO/W.PET/CERES
800 CREAM (GRAM) TOPICAL
Status: DISCONTINUED | OUTPATIENT
Start: 2021-07-05 | End: 2021-07-09 | Stop reason: HOSPADM

## 2021-07-04 RX ORDER — SODIUM,POTASSIUM PHOSPHATES 280-250MG
2 POWDER IN PACKET (EA) ORAL
Status: DISCONTINUED | OUTPATIENT
Start: 2021-07-05 | End: 2021-07-09 | Stop reason: HOSPADM

## 2021-07-04 RX ORDER — SODIUM CHLORIDE 0.9 % (FLUSH) 0.9 %
10 SYRINGE (ML) INJECTION
Status: DISCONTINUED | OUTPATIENT
Start: 2021-07-05 | End: 2021-07-09 | Stop reason: HOSPADM

## 2021-07-04 RX ORDER — ONDANSETRON 2 MG/ML
4 INJECTION INTRAMUSCULAR; INTRAVENOUS EVERY 8 HOURS PRN
Status: DISCONTINUED | OUTPATIENT
Start: 2021-07-05 | End: 2021-07-09 | Stop reason: HOSPADM

## 2021-07-04 RX ORDER — HYDROCODONE BITARTRATE AND ACETAMINOPHEN 7.5; 325 MG/1; MG/1
1 TABLET ORAL EVERY 4 HOURS PRN
Status: DISCONTINUED | OUTPATIENT
Start: 2021-07-05 | End: 2021-07-09 | Stop reason: HOSPADM

## 2021-07-04 RX ORDER — ACETAMINOPHEN 325 MG/1
650 TABLET ORAL EVERY 4 HOURS PRN
Status: DISCONTINUED | OUTPATIENT
Start: 2021-07-05 | End: 2021-07-09 | Stop reason: HOSPADM

## 2021-07-04 RX ORDER — HYDROCODONE BITARTRATE AND ACETAMINOPHEN 5; 325 MG/1; MG/1
1 TABLET ORAL
Status: COMPLETED | OUTPATIENT
Start: 2021-07-04 | End: 2021-07-04

## 2021-07-04 RX ORDER — ACETAMINOPHEN 325 MG/1
650 TABLET ORAL EVERY 8 HOURS PRN
Status: DISCONTINUED | OUTPATIENT
Start: 2021-07-05 | End: 2021-07-09 | Stop reason: HOSPADM

## 2021-07-04 RX ORDER — TALC
6 POWDER (GRAM) TOPICAL NIGHTLY PRN
Status: DISCONTINUED | OUTPATIENT
Start: 2021-07-05 | End: 2021-07-09 | Stop reason: HOSPADM

## 2021-07-04 RX ADMIN — HYDROCODONE BITARTRATE AND ACETAMINOPHEN 1 TABLET: 5; 325 TABLET ORAL at 10:07

## 2021-07-04 RX ADMIN — SODIUM CHLORIDE 250 ML: 0.9 INJECTION, SOLUTION INTRAVENOUS at 10:07
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
## 2021-07-05 PROBLEM — Z86.718 PERSONAL HISTORY OF THROMBOEMBOLIC DISEASE: Chronic | Status: ACTIVE | Noted: 2021-07-05

## 2021-07-05 PROBLEM — R55 NEAR SYNCOPE: Status: ACTIVE | Noted: 2021-07-05

## 2021-07-05 LAB
ANION GAP SERPL CALC-SCNC: 8 MMOL/L (ref 8–16)
BASOPHILS # BLD AUTO: 0.03 K/UL (ref 0–0.2)
BASOPHILS NFR BLD: 0.3 % (ref 0–1.9)
BUN SERPL-MCNC: 54 MG/DL (ref 8–23)
CALCIUM SERPL-MCNC: 8.3 MG/DL (ref 8.7–10.5)
CHLORIDE SERPL-SCNC: 108 MMOL/L (ref 95–110)
CK SERPL-CCNC: 151 U/L (ref 20–180)
CO2 SERPL-SCNC: 22 MMOL/L (ref 23–29)
CREAT SERPL-MCNC: 2 MG/DL (ref 0.5–1.4)
DIFFERENTIAL METHOD: ABNORMAL
EOSINOPHIL # BLD AUTO: 0.1 K/UL (ref 0–0.5)
EOSINOPHIL NFR BLD: 1.4 % (ref 0–8)
ERYTHROCYTE [DISTWIDTH] IN BLOOD BY AUTOMATED COUNT: 14.9 % (ref 11.5–14.5)
EST. GFR  (AFRICAN AMERICAN): 24.8 ML/MIN/1.73 M^2
EST. GFR  (NON AFRICAN AMERICAN): 21.5 ML/MIN/1.73 M^2
FOLATE SERPL-MCNC: >24.8 NG/ML (ref 4–24)
GLUCOSE SERPL-MCNC: 111 MG/DL (ref 70–110)
GLUCOSE SERPL-MCNC: 143 MG/DL (ref 70–110)
GLUCOSE SERPL-MCNC: 157 MG/DL (ref 70–110)
HCT VFR BLD AUTO: 25 % (ref 37–48.5)
HGB BLD-MCNC: 8.2 G/DL (ref 12–16)
IMM GRANULOCYTES # BLD AUTO: 0.06 K/UL (ref 0–0.04)
IMM GRANULOCYTES NFR BLD AUTO: 0.7 % (ref 0–0.5)
LYMPHOCYTES # BLD AUTO: 1.1 K/UL (ref 1–4.8)
LYMPHOCYTES NFR BLD: 12.1 % (ref 18–48)
MAGNESIUM SERPL-MCNC: 1.9 MG/DL (ref 1.6–2.6)
MCH RBC QN AUTO: 32.3 PG (ref 27–31)
MCHC RBC AUTO-ENTMCNC: 32.8 G/DL (ref 32–36)
MCV RBC AUTO: 98 FL (ref 82–98)
MONOCYTES # BLD AUTO: 1 K/UL (ref 0.3–1)
MONOCYTES NFR BLD: 11 % (ref 4–15)
NEUTROPHILS # BLD AUTO: 6.5 K/UL (ref 1.8–7.7)
NEUTROPHILS NFR BLD: 74.5 % (ref 38–73)
NRBC BLD-RTO: 0 /100 WBC
PLATELET # BLD AUTO: 131 K/UL (ref 150–450)
PMV BLD AUTO: 11.4 FL (ref 9.2–12.9)
POTASSIUM SERPL-SCNC: 4.1 MMOL/L (ref 3.5–5.1)
RBC # BLD AUTO: 2.54 M/UL (ref 4–5.4)
SODIUM SERPL-SCNC: 138 MMOL/L (ref 136–145)
VIT B12 SERPL-MCNC: 748 PG/ML (ref 210–950)
WBC # BLD AUTO: 8.7 K/UL (ref 3.9–12.7)

## 2021-07-05 PROCEDURE — 94761 N-INVAS EAR/PLS OXIMETRY MLT: CPT

## 2021-07-05 PROCEDURE — 82746 ASSAY OF FOLIC ACID SERUM: CPT | Performed by: NURSE PRACTITIONER

## 2021-07-05 PROCEDURE — 83735 ASSAY OF MAGNESIUM: CPT | Performed by: NURSE PRACTITIONER

## 2021-07-05 PROCEDURE — 80048 BASIC METABOLIC PNL TOTAL CA: CPT | Performed by: NURSE PRACTITIONER

## 2021-07-05 PROCEDURE — 99223 1ST HOSP IP/OBS HIGH 75: CPT | Mod: ,,, | Performed by: ORTHOPAEDIC SURGERY

## 2021-07-05 PROCEDURE — 36415 COLL VENOUS BLD VENIPUNCTURE: CPT | Performed by: NURSE PRACTITIONER

## 2021-07-05 PROCEDURE — 12000002 HC ACUTE/MED SURGE SEMI-PRIVATE ROOM

## 2021-07-05 PROCEDURE — 99900035 HC TECH TIME PER 15 MIN (STAT)

## 2021-07-05 PROCEDURE — 99223 PR INITIAL HOSPITAL CARE,LEVL III: ICD-10-PCS | Mod: ,,, | Performed by: ORTHOPAEDIC SURGERY

## 2021-07-05 PROCEDURE — 25000003 PHARM REV CODE 250: Performed by: NURSE PRACTITIONER

## 2021-07-05 PROCEDURE — 99900031 HC PATIENT EDUCATION (STAT)

## 2021-07-05 PROCEDURE — 82550 ASSAY OF CK (CPK): CPT | Performed by: NURSE PRACTITIONER

## 2021-07-05 PROCEDURE — 97530 THERAPEUTIC ACTIVITIES: CPT

## 2021-07-05 PROCEDURE — 85025 COMPLETE CBC W/AUTO DIFF WBC: CPT | Performed by: NURSE PRACTITIONER

## 2021-07-05 PROCEDURE — 30200315 PPD INTRADERMAL TEST REV CODE 302: Performed by: NURSE PRACTITIONER

## 2021-07-05 PROCEDURE — 86580 TB INTRADERMAL TEST: CPT | Performed by: NURSE PRACTITIONER

## 2021-07-05 PROCEDURE — 27000221 HC OXYGEN, UP TO 24 HOURS

## 2021-07-05 PROCEDURE — 82962 GLUCOSE BLOOD TEST: CPT

## 2021-07-05 PROCEDURE — 25000242 PHARM REV CODE 250 ALT 637 W/ HCPCS: Performed by: NURSE PRACTITIONER

## 2021-07-05 PROCEDURE — 82607 VITAMIN B-12: CPT | Performed by: NURSE PRACTITIONER

## 2021-07-05 PROCEDURE — 97162 PT EVAL MOD COMPLEX 30 MIN: CPT

## 2021-07-05 RX ORDER — LANOLIN ALCOHOL/MO/W.PET/CERES
400 CREAM (GRAM) TOPICAL DAILY
Status: DISCONTINUED | OUTPATIENT
Start: 2021-07-05 | End: 2021-07-09 | Stop reason: HOSPADM

## 2021-07-05 RX ORDER — ALBUTEROL SULFATE 0.83 MG/ML
1.25 SOLUTION RESPIRATORY (INHALATION) EVERY 4 HOURS PRN
Status: DISCONTINUED | OUTPATIENT
Start: 2021-07-05 | End: 2021-07-05

## 2021-07-05 RX ORDER — SODIUM CHLORIDE 9 MG/ML
INJECTION, SOLUTION INTRAVENOUS CONTINUOUS
Status: DISCONTINUED | OUTPATIENT
Start: 2021-07-05 | End: 2021-07-05

## 2021-07-05 RX ORDER — TRIAMCINOLONE ACETONIDE 1 MG/G
CREAM TOPICAL 2 TIMES DAILY
Status: DISCONTINUED | OUTPATIENT
Start: 2021-07-05 | End: 2021-07-09 | Stop reason: HOSPADM

## 2021-07-05 RX ORDER — ALLOPURINOL 100 MG/1
100 TABLET ORAL DAILY
Status: DISCONTINUED | OUTPATIENT
Start: 2021-07-05 | End: 2021-07-09 | Stop reason: HOSPADM

## 2021-07-05 RX ORDER — MECLIZINE HCL 12.5 MG 12.5 MG/1
25 TABLET ORAL 3 TIMES DAILY PRN
Status: DISCONTINUED | OUTPATIENT
Start: 2021-07-05 | End: 2021-07-09 | Stop reason: HOSPADM

## 2021-07-05 RX ORDER — SERTRALINE HYDROCHLORIDE 25 MG/1
25 TABLET, FILM COATED ORAL DAILY
Status: DISCONTINUED | OUTPATIENT
Start: 2021-07-05 | End: 2021-07-09 | Stop reason: HOSPADM

## 2021-07-05 RX ORDER — AMLODIPINE BESYLATE 5 MG/1
5 TABLET ORAL 2 TIMES DAILY
Status: DISCONTINUED | OUTPATIENT
Start: 2021-07-05 | End: 2021-07-09 | Stop reason: HOSPADM

## 2021-07-05 RX ORDER — MONTELUKAST SODIUM 10 MG/1
10 TABLET ORAL NIGHTLY
Status: DISCONTINUED | OUTPATIENT
Start: 2021-07-05 | End: 2021-07-09 | Stop reason: HOSPADM

## 2021-07-05 RX ORDER — METHOCARBAMOL 500 MG/1
500 TABLET, FILM COATED ORAL 2 TIMES DAILY PRN
Status: DISCONTINUED | OUTPATIENT
Start: 2021-07-05 | End: 2021-07-09 | Stop reason: HOSPADM

## 2021-07-05 RX ORDER — ATORVASTATIN CALCIUM 20 MG/1
20 TABLET, FILM COATED ORAL DAILY
Status: DISCONTINUED | OUTPATIENT
Start: 2021-07-05 | End: 2021-07-05

## 2021-07-05 RX ORDER — FERROUS SULFATE 325(65) MG
325 TABLET ORAL DAILY
Status: DISCONTINUED | OUTPATIENT
Start: 2021-07-05 | End: 2021-07-09 | Stop reason: HOSPADM

## 2021-07-05 RX ORDER — TRIAMCINOLONE ACETONIDE 1 MG/ML
LOTION TOPICAL 2 TIMES DAILY
Status: DISCONTINUED | OUTPATIENT
Start: 2021-07-05 | End: 2021-07-05

## 2021-07-05 RX ORDER — FLUTICASONE PROPIONATE 50 MCG
2 SPRAY, SUSPENSION (ML) NASAL DAILY
Status: DISCONTINUED | OUTPATIENT
Start: 2021-07-05 | End: 2021-07-09 | Stop reason: HOSPADM

## 2021-07-05 RX ORDER — NITROGLYCERIN 0.4 MG/1
0.4 TABLET SUBLINGUAL EVERY 5 MIN PRN
Status: DISCONTINUED | OUTPATIENT
Start: 2021-07-05 | End: 2021-07-09 | Stop reason: HOSPADM

## 2021-07-05 RX ORDER — LEVOTHYROXINE SODIUM 88 UG/1
88 TABLET ORAL DAILY
Status: DISCONTINUED | OUTPATIENT
Start: 2021-07-05 | End: 2021-07-09 | Stop reason: HOSPADM

## 2021-07-05 RX ORDER — PANTOPRAZOLE SODIUM 40 MG/1
40 TABLET, DELAYED RELEASE ORAL DAILY
Status: DISCONTINUED | OUTPATIENT
Start: 2021-07-05 | End: 2021-07-09 | Stop reason: HOSPADM

## 2021-07-05 RX ORDER — IPRATROPIUM BROMIDE AND ALBUTEROL SULFATE 2.5; .5 MG/3ML; MG/3ML
3 SOLUTION RESPIRATORY (INHALATION) EVERY 6 HOURS PRN
Status: DISCONTINUED | OUTPATIENT
Start: 2021-07-05 | End: 2021-07-09 | Stop reason: HOSPADM

## 2021-07-05 RX ORDER — POLYETHYLENE GLYCOL 3350 17 G/17G
17 POWDER, FOR SOLUTION ORAL DAILY
Status: DISCONTINUED | OUTPATIENT
Start: 2021-07-06 | End: 2021-07-09 | Stop reason: HOSPADM

## 2021-07-05 RX ORDER — DOXYCYCLINE 100 MG/1
100 CAPSULE ORAL 2 TIMES DAILY
Status: COMPLETED | OUTPATIENT
Start: 2021-07-05 | End: 2021-07-05

## 2021-07-05 RX ADMIN — PANTOPRAZOLE SODIUM 40 MG: 40 TABLET, DELAYED RELEASE ORAL at 05:07

## 2021-07-05 RX ADMIN — MONTELUKAST SODIUM 10 MG: 10 TABLET, COATED ORAL at 08:07

## 2021-07-05 RX ADMIN — SODIUM CHLORIDE: 0.9 INJECTION, SOLUTION INTRAVENOUS at 01:07

## 2021-07-05 RX ADMIN — RIVAROXABAN 10 MG: 10 TABLET, FILM COATED ORAL at 05:07

## 2021-07-05 RX ADMIN — METHOCARBAMOL TABLETS 500 MG: 500 TABLET, COATED ORAL at 01:07

## 2021-07-05 RX ADMIN — LEVOTHYROXINE SODIUM 88 MCG: 88 TABLET ORAL at 05:07

## 2021-07-05 RX ADMIN — HYDROCODONE BITARTRATE AND ACETAMINOPHEN 1 TABLET: 7.5; 325 TABLET ORAL at 10:07

## 2021-07-05 RX ADMIN — TRIAMCINOLONE ACETONIDE: 1 CREAM TOPICAL at 08:07

## 2021-07-05 RX ADMIN — MAGNESIUM OXIDE 400 MG: 400 TABLET ORAL at 09:07

## 2021-07-05 RX ADMIN — TUBERCULIN PURIFIED PROTEIN DERIVATIVE 5 UNITS: 5 INJECTION, SOLUTION INTRADERMAL at 08:07

## 2021-07-05 RX ADMIN — HYDROCODONE BITARTRATE AND ACETAMINOPHEN 1 TABLET: 7.5; 325 TABLET ORAL at 01:07

## 2021-07-05 RX ADMIN — AMLODIPINE BESYLATE 5 MG: 5 TABLET ORAL at 08:07

## 2021-07-05 RX ADMIN — TRIAMCINOLONE ACETONIDE: 1 CREAM TOPICAL at 09:07

## 2021-07-05 RX ADMIN — AMLODIPINE BESYLATE 5 MG: 5 TABLET ORAL at 10:07

## 2021-07-05 RX ADMIN — DOXYCYCLINE HYCLATE 100 MG: 100 CAPSULE ORAL at 10:07

## 2021-07-05 RX ADMIN — SERTRALINE HYDROCHLORIDE 25 MG: 25 TABLET ORAL at 10:07

## 2021-07-05 RX ADMIN — DOXYCYCLINE HYCLATE 100 MG: 100 CAPSULE ORAL at 08:07

## 2021-07-05 RX ADMIN — FLUTICASONE PROPIONATE 100 MCG: 50 SPRAY, METERED NASAL at 09:07

## 2021-07-05 RX ADMIN — ALLOPURINOL 100 MG: 100 TABLET ORAL at 10:07

## 2021-07-05 RX ADMIN — MONTELUKAST SODIUM 10 MG: 10 TABLET, COATED ORAL at 01:07

## 2021-07-05 RX ADMIN — HYDROCODONE BITARTRATE AND ACETAMINOPHEN 1 TABLET: 7.5; 325 TABLET ORAL at 05:07

## 2021-07-05 RX ADMIN — HYDROCODONE BITARTRATE AND ACETAMINOPHEN 1 TABLET: 7.5; 325 TABLET ORAL at 03:07

## 2021-07-05 RX ADMIN — ATORVASTATIN CALCIUM 20 MG: 20 TABLET, FILM COATED ORAL at 10:07

## 2021-07-05 RX ADMIN — FERROUS SULFATE TAB 325 MG (65 MG ELEMENTAL FE) 325 MG: 325 (65 FE) TAB at 10:07

## 2021-07-06 LAB
ALBUMIN SERPL BCP-MCNC: 3 G/DL (ref 3.5–5.2)
ALP SERPL-CCNC: 72 U/L (ref 55–135)
ALT SERPL W/O P-5'-P-CCNC: 38 U/L (ref 10–44)
ANION GAP SERPL CALC-SCNC: 6 MMOL/L (ref 8–16)
AST SERPL-CCNC: 44 U/L (ref 10–40)
BASOPHILS # BLD AUTO: 0.03 K/UL (ref 0–0.2)
BASOPHILS NFR BLD: 0.4 % (ref 0–1.9)
BILIRUB SERPL-MCNC: 0.8 MG/DL (ref 0.1–1)
BUN SERPL-MCNC: 55 MG/DL (ref 8–23)
CALCIUM SERPL-MCNC: 8 MG/DL (ref 8.7–10.5)
CHLORIDE SERPL-SCNC: 111 MMOL/L (ref 95–110)
CO2 SERPL-SCNC: 24 MMOL/L (ref 23–29)
CREAT SERPL-MCNC: 1.6 MG/DL (ref 0.5–1.4)
DIFFERENTIAL METHOD: ABNORMAL
EOSINOPHIL # BLD AUTO: 0.5 K/UL (ref 0–0.5)
EOSINOPHIL NFR BLD: 6.3 % (ref 0–8)
ERYTHROCYTE [DISTWIDTH] IN BLOOD BY AUTOMATED COUNT: 14.9 % (ref 11.5–14.5)
EST. GFR  (AFRICAN AMERICAN): 32.5 ML/MIN/1.73 M^2
EST. GFR  (NON AFRICAN AMERICAN): 28.2 ML/MIN/1.73 M^2
GLUCOSE SERPL-MCNC: 130 MG/DL (ref 70–110)
GLUCOSE SERPL-MCNC: 131 MG/DL (ref 70–110)
GLUCOSE SERPL-MCNC: 147 MG/DL (ref 70–110)
HCT VFR BLD AUTO: 25.2 % (ref 37–48.5)
HGB BLD-MCNC: 8.1 G/DL (ref 12–16)
IMM GRANULOCYTES # BLD AUTO: 0.03 K/UL (ref 0–0.04)
IMM GRANULOCYTES NFR BLD AUTO: 0.4 % (ref 0–0.5)
LYMPHOCYTES # BLD AUTO: 0.8 K/UL (ref 1–4.8)
LYMPHOCYTES NFR BLD: 11.5 % (ref 18–48)
MAGNESIUM SERPL-MCNC: 2 MG/DL (ref 1.6–2.6)
MCH RBC QN AUTO: 32.3 PG (ref 27–31)
MCHC RBC AUTO-ENTMCNC: 32.1 G/DL (ref 32–36)
MCV RBC AUTO: 100 FL (ref 82–98)
MONOCYTES # BLD AUTO: 0.8 K/UL (ref 0.3–1)
MONOCYTES NFR BLD: 10.7 % (ref 4–15)
NEUTROPHILS # BLD AUTO: 5.1 K/UL (ref 1.8–7.7)
NEUTROPHILS NFR BLD: 70.7 % (ref 38–73)
NRBC BLD-RTO: 0 /100 WBC
PLATELET # BLD AUTO: 111 K/UL (ref 150–450)
PLATELET BLD QL SMEAR: ABNORMAL
PMV BLD AUTO: 11.5 FL (ref 9.2–12.9)
POTASSIUM SERPL-SCNC: 4.2 MMOL/L (ref 3.5–5.1)
PROT SERPL-MCNC: 5.2 G/DL (ref 6–8.4)
RBC # BLD AUTO: 2.51 M/UL (ref 4–5.4)
SODIUM SERPL-SCNC: 141 MMOL/L (ref 136–145)
WBC # BLD AUTO: 7.19 K/UL (ref 3.9–12.7)

## 2021-07-06 PROCEDURE — 25000003 PHARM REV CODE 250: Performed by: STUDENT IN AN ORGANIZED HEALTH CARE EDUCATION/TRAINING PROGRAM

## 2021-07-06 PROCEDURE — 97530 THERAPEUTIC ACTIVITIES: CPT | Mod: CQ

## 2021-07-06 PROCEDURE — 97165 OT EVAL LOW COMPLEX 30 MIN: CPT

## 2021-07-06 PROCEDURE — 25000242 PHARM REV CODE 250 ALT 637 W/ HCPCS: Performed by: STUDENT IN AN ORGANIZED HEALTH CARE EDUCATION/TRAINING PROGRAM

## 2021-07-06 PROCEDURE — 25000003 PHARM REV CODE 250: Performed by: NURSE PRACTITIONER

## 2021-07-06 PROCEDURE — 12000002 HC ACUTE/MED SURGE SEMI-PRIVATE ROOM

## 2021-07-06 PROCEDURE — 36415 COLL VENOUS BLD VENIPUNCTURE: CPT | Performed by: INTERNAL MEDICINE

## 2021-07-06 PROCEDURE — 83735 ASSAY OF MAGNESIUM: CPT | Performed by: NURSE PRACTITIONER

## 2021-07-06 PROCEDURE — 80053 COMPREHEN METABOLIC PANEL: CPT | Performed by: INTERNAL MEDICINE

## 2021-07-06 PROCEDURE — 82962 GLUCOSE BLOOD TEST: CPT

## 2021-07-06 PROCEDURE — 99900031 HC PATIENT EDUCATION (STAT)

## 2021-07-06 PROCEDURE — 94761 N-INVAS EAR/PLS OXIMETRY MLT: CPT

## 2021-07-06 PROCEDURE — 99900035 HC TECH TIME PER 15 MIN (STAT)

## 2021-07-06 PROCEDURE — 27000221 HC OXYGEN, UP TO 24 HOURS

## 2021-07-06 PROCEDURE — 25000003 PHARM REV CODE 250: Performed by: INTERNAL MEDICINE

## 2021-07-06 PROCEDURE — 85025 COMPLETE CBC W/AUTO DIFF WBC: CPT | Performed by: NURSE PRACTITIONER

## 2021-07-06 PROCEDURE — 94640 AIRWAY INHALATION TREATMENT: CPT

## 2021-07-06 PROCEDURE — 97535 SELF CARE MNGMENT TRAINING: CPT

## 2021-07-06 RX ORDER — LACTULOSE 10 G/15ML
15 SOLUTION ORAL ONCE
Status: COMPLETED | OUTPATIENT
Start: 2021-07-06 | End: 2021-07-06

## 2021-07-06 RX ORDER — TAMSULOSIN HYDROCHLORIDE 0.4 MG/1
0.4 CAPSULE ORAL DAILY
Status: DISCONTINUED | OUTPATIENT
Start: 2021-07-06 | End: 2021-07-09 | Stop reason: HOSPADM

## 2021-07-06 RX ORDER — FLUTICASONE FUROATE AND VILANTEROL 100; 25 UG/1; UG/1
1 POWDER RESPIRATORY (INHALATION) DAILY
Status: DISCONTINUED | OUTPATIENT
Start: 2021-07-06 | End: 2021-07-09 | Stop reason: HOSPADM

## 2021-07-06 RX ADMIN — MONTELUKAST SODIUM 10 MG: 10 TABLET, COATED ORAL at 09:07

## 2021-07-06 RX ADMIN — HYDROCODONE BITARTRATE AND ACETAMINOPHEN 1 TABLET: 7.5; 325 TABLET ORAL at 04:07

## 2021-07-06 RX ADMIN — FLUTICASONE FUROATE AND VILANTEROL TRIFENATATE 1 PUFF: 100; 25 POWDER RESPIRATORY (INHALATION) at 02:07

## 2021-07-06 RX ADMIN — FERROUS SULFATE TAB 325 MG (65 MG ELEMENTAL FE) 325 MG: 325 (65 FE) TAB at 09:07

## 2021-07-06 RX ADMIN — AMLODIPINE BESYLATE 5 MG: 5 TABLET ORAL at 09:07

## 2021-07-06 RX ADMIN — LEVOTHYROXINE SODIUM 88 MCG: 88 TABLET ORAL at 05:07

## 2021-07-06 RX ADMIN — FLUTICASONE PROPIONATE 100 MCG: 50 SPRAY, METERED NASAL at 09:07

## 2021-07-06 RX ADMIN — ALLOPURINOL 100 MG: 100 TABLET ORAL at 09:07

## 2021-07-06 RX ADMIN — LACTULOSE 15 G: 20 SOLUTION ORAL at 05:07

## 2021-07-06 RX ADMIN — PANTOPRAZOLE SODIUM 40 MG: 40 TABLET, DELAYED RELEASE ORAL at 05:07

## 2021-07-06 RX ADMIN — TRIAMCINOLONE ACETONIDE: 1 CREAM TOPICAL at 09:07

## 2021-07-06 RX ADMIN — TIOTROPIUM BROMIDE INHALATION SPRAY 2 PUFF: 1.56 SPRAY, METERED RESPIRATORY (INHALATION) at 02:07

## 2021-07-06 RX ADMIN — HYDROCODONE BITARTRATE AND ACETAMINOPHEN 1 TABLET: 7.5; 325 TABLET ORAL at 05:07

## 2021-07-06 RX ADMIN — MAGNESIUM OXIDE 400 MG: 400 TABLET ORAL at 09:07

## 2021-07-06 RX ADMIN — POLYETHYLENE GLYCOL 3350 17 G: 17 POWDER, FOR SOLUTION ORAL at 09:07

## 2021-07-06 RX ADMIN — RIVAROXABAN 10 MG: 10 TABLET, FILM COATED ORAL at 04:07

## 2021-07-06 RX ADMIN — TAMSULOSIN HYDROCHLORIDE 0.4 MG: 0.4 CAPSULE ORAL at 09:07

## 2021-07-06 RX ADMIN — HYDROCODONE BITARTRATE AND ACETAMINOPHEN 1 TABLET: 7.5; 325 TABLET ORAL at 09:07

## 2021-07-06 RX ADMIN — SERTRALINE HYDROCHLORIDE 25 MG: 25 TABLET ORAL at 09:07

## 2021-07-07 LAB
ANION GAP SERPL CALC-SCNC: 6 MMOL/L (ref 8–16)
BUN SERPL-MCNC: 63 MG/DL (ref 8–23)
CALCIUM SERPL-MCNC: 8.1 MG/DL (ref 8.7–10.5)
CHLORIDE SERPL-SCNC: 111 MMOL/L (ref 95–110)
CO2 SERPL-SCNC: 24 MMOL/L (ref 23–29)
CREAT SERPL-MCNC: 1.8 MG/DL (ref 0.5–1.4)
ERYTHROCYTE [DISTWIDTH] IN BLOOD BY AUTOMATED COUNT: 15 % (ref 11.5–14.5)
EST. GFR  (AFRICAN AMERICAN): 28.2 ML/MIN/1.73 M^2
EST. GFR  (NON AFRICAN AMERICAN): 24.4 ML/MIN/1.73 M^2
GLUCOSE SERPL-MCNC: 124 MG/DL (ref 70–110)
GLUCOSE SERPL-MCNC: 125 MG/DL (ref 70–110)
GLUCOSE SERPL-MCNC: 134 MG/DL (ref 70–110)
GLUCOSE SERPL-MCNC: 134 MG/DL (ref 70–110)
GLUCOSE SERPL-MCNC: 157 MG/DL (ref 70–110)
HCT VFR BLD AUTO: 23.7 % (ref 37–48.5)
HGB BLD-MCNC: 7.5 G/DL (ref 12–16)
MCH RBC QN AUTO: 32.2 PG (ref 27–31)
MCHC RBC AUTO-ENTMCNC: 31.6 G/DL (ref 32–36)
MCV RBC AUTO: 102 FL (ref 82–98)
PLATELET # BLD AUTO: 109 K/UL (ref 150–450)
PMV BLD AUTO: 11.4 FL (ref 9.2–12.9)
POTASSIUM SERPL-SCNC: 4.7 MMOL/L (ref 3.5–5.1)
RBC # BLD AUTO: 2.33 M/UL (ref 4–5.4)
SODIUM SERPL-SCNC: 141 MMOL/L (ref 136–145)
WBC # BLD AUTO: 7.51 K/UL (ref 3.9–12.7)

## 2021-07-07 PROCEDURE — 97530 THERAPEUTIC ACTIVITIES: CPT | Mod: CQ

## 2021-07-07 PROCEDURE — 27000221 HC OXYGEN, UP TO 24 HOURS

## 2021-07-07 PROCEDURE — 99900035 HC TECH TIME PER 15 MIN (STAT)

## 2021-07-07 PROCEDURE — 94640 AIRWAY INHALATION TREATMENT: CPT

## 2021-07-07 PROCEDURE — 25000003 PHARM REV CODE 250: Performed by: NURSE PRACTITIONER

## 2021-07-07 PROCEDURE — 80048 BASIC METABOLIC PNL TOTAL CA: CPT | Performed by: STUDENT IN AN ORGANIZED HEALTH CARE EDUCATION/TRAINING PROGRAM

## 2021-07-07 PROCEDURE — 99900031 HC PATIENT EDUCATION (STAT)

## 2021-07-07 PROCEDURE — 85027 COMPLETE CBC AUTOMATED: CPT | Performed by: STUDENT IN AN ORGANIZED HEALTH CARE EDUCATION/TRAINING PROGRAM

## 2021-07-07 PROCEDURE — 99449 PR INTERPROF, PHONE/INTERNET/EHR, CONSULT, >= 31 MINS: ICD-10-PCS | Mod: ,,, | Performed by: UROLOGY

## 2021-07-07 PROCEDURE — 25000003 PHARM REV CODE 250: Performed by: INTERNAL MEDICINE

## 2021-07-07 PROCEDURE — 99449 NTRPROF PH1/NTRNET/EHR 31/>: CPT | Mod: ,,, | Performed by: UROLOGY

## 2021-07-07 PROCEDURE — 25000003 PHARM REV CODE 250: Performed by: STUDENT IN AN ORGANIZED HEALTH CARE EDUCATION/TRAINING PROGRAM

## 2021-07-07 PROCEDURE — 82962 GLUCOSE BLOOD TEST: CPT

## 2021-07-07 PROCEDURE — 12000002 HC ACUTE/MED SURGE SEMI-PRIVATE ROOM

## 2021-07-07 PROCEDURE — 36415 COLL VENOUS BLD VENIPUNCTURE: CPT | Performed by: STUDENT IN AN ORGANIZED HEALTH CARE EDUCATION/TRAINING PROGRAM

## 2021-07-07 PROCEDURE — 94761 N-INVAS EAR/PLS OXIMETRY MLT: CPT

## 2021-07-07 RX ORDER — LACTULOSE 10 G/15ML
15 SOLUTION ORAL ONCE
Status: COMPLETED | OUTPATIENT
Start: 2021-07-07 | End: 2021-07-07

## 2021-07-07 RX ORDER — SODIUM CHLORIDE 9 MG/ML
INJECTION, SOLUTION INTRAVENOUS CONTINUOUS
Status: DISCONTINUED | OUTPATIENT
Start: 2021-07-07 | End: 2021-07-09 | Stop reason: HOSPADM

## 2021-07-07 RX ADMIN — FERROUS SULFATE TAB 325 MG (65 MG ELEMENTAL FE) 325 MG: 325 (65 FE) TAB at 09:07

## 2021-07-07 RX ADMIN — LEVOTHYROXINE SODIUM 88 MCG: 88 TABLET ORAL at 05:07

## 2021-07-07 RX ADMIN — HYDROCODONE BITARTRATE AND ACETAMINOPHEN 1 TABLET: 7.5; 325 TABLET ORAL at 04:07

## 2021-07-07 RX ADMIN — LACTULOSE 15 G: 20 SOLUTION ORAL at 09:07

## 2021-07-07 RX ADMIN — AMLODIPINE BESYLATE 5 MG: 5 TABLET ORAL at 09:07

## 2021-07-07 RX ADMIN — MAGNESIUM OXIDE 400 MG: 400 TABLET ORAL at 09:07

## 2021-07-07 RX ADMIN — SODIUM CHLORIDE: 0.9 INJECTION, SOLUTION INTRAVENOUS at 01:07

## 2021-07-07 RX ADMIN — HYDROCODONE BITARTRATE AND ACETAMINOPHEN 1 TABLET: 7.5; 325 TABLET ORAL at 07:07

## 2021-07-07 RX ADMIN — TIOTROPIUM BROMIDE INHALATION SPRAY 2 PUFF: 1.56 SPRAY, METERED RESPIRATORY (INHALATION) at 07:07

## 2021-07-07 RX ADMIN — ALLOPURINOL 100 MG: 100 TABLET ORAL at 09:07

## 2021-07-07 RX ADMIN — FLUTICASONE FUROATE AND VILANTEROL TRIFENATATE 1 PUFF: 100; 25 POWDER RESPIRATORY (INHALATION) at 07:07

## 2021-07-07 RX ADMIN — AMLODIPINE BESYLATE 5 MG: 5 TABLET ORAL at 08:07

## 2021-07-07 RX ADMIN — FLUTICASONE PROPIONATE 100 MCG: 50 SPRAY, METERED NASAL at 09:07

## 2021-07-07 RX ADMIN — PANTOPRAZOLE SODIUM 40 MG: 40 TABLET, DELAYED RELEASE ORAL at 05:07

## 2021-07-07 RX ADMIN — MONTELUKAST SODIUM 10 MG: 10 TABLET, COATED ORAL at 08:07

## 2021-07-07 RX ADMIN — TRIAMCINOLONE ACETONIDE: 1 CREAM TOPICAL at 09:07

## 2021-07-07 RX ADMIN — RIVAROXABAN 10 MG: 10 TABLET, FILM COATED ORAL at 04:07

## 2021-07-07 RX ADMIN — SERTRALINE HYDROCHLORIDE 25 MG: 25 TABLET ORAL at 09:07

## 2021-07-07 RX ADMIN — POLYETHYLENE GLYCOL 3350 17 G: 17 POWDER, FOR SOLUTION ORAL at 09:07

## 2021-07-07 RX ADMIN — TAMSULOSIN HYDROCHLORIDE 0.4 MG: 0.4 CAPSULE ORAL at 09:07

## 2021-07-08 ENCOUNTER — TELEPHONE (OUTPATIENT)
Dept: UROLOGY | Facility: CLINIC | Age: 86
End: 2021-07-08

## 2021-07-08 LAB
ABO + RH BLD: NORMAL
ANION GAP SERPL CALC-SCNC: 8 MMOL/L (ref 8–16)
BLD GP AB SCN CELLS X3 SERPL QL: NORMAL
BUN SERPL-MCNC: 61 MG/DL (ref 8–23)
CALCIUM SERPL-MCNC: 8.3 MG/DL (ref 8.7–10.5)
CHLORIDE SERPL-SCNC: 111 MMOL/L (ref 95–110)
CO2 SERPL-SCNC: 22 MMOL/L (ref 23–29)
CREAT SERPL-MCNC: 1.7 MG/DL (ref 0.5–1.4)
ERYTHROCYTE [DISTWIDTH] IN BLOOD BY AUTOMATED COUNT: 15.1 % (ref 11.5–14.5)
EST. GFR  (AFRICAN AMERICAN): 30.2 ML/MIN/1.73 M^2
EST. GFR  (NON AFRICAN AMERICAN): 26.2 ML/MIN/1.73 M^2
GLUCOSE SERPL-MCNC: 132 MG/DL (ref 70–110)
GLUCOSE SERPL-MCNC: 143 MG/DL (ref 70–110)
GLUCOSE SERPL-MCNC: 145 MG/DL (ref 70–110)
GLUCOSE SERPL-MCNC: 156 MG/DL (ref 70–110)
GLUCOSE SERPL-MCNC: 175 MG/DL (ref 70–110)
HCT VFR BLD AUTO: 21.7 % (ref 37–48.5)
HGB BLD-MCNC: 7 G/DL (ref 12–16)
IRON SERPL-MCNC: 18 UG/DL (ref 30–160)
MAGNESIUM SERPL-MCNC: 2.4 MG/DL (ref 1.6–2.6)
MCH RBC QN AUTO: 32.3 PG (ref 27–31)
MCHC RBC AUTO-ENTMCNC: 32.3 G/DL (ref 32–36)
MCV RBC AUTO: 100 FL (ref 82–98)
PLATELET # BLD AUTO: 108 K/UL (ref 150–450)
PMV BLD AUTO: 11.8 FL (ref 9.2–12.9)
POTASSIUM SERPL-SCNC: 4.4 MMOL/L (ref 3.5–5.1)
RBC # BLD AUTO: 2.17 M/UL (ref 4–5.4)
SATURATED IRON: 12 % (ref 20–50)
SODIUM SERPL-SCNC: 141 MMOL/L (ref 136–145)
TB INDURATION 48 - 72 HR READ: 0 0 MM
TOTAL IRON BINDING CAPACITY: 154 UG/DL (ref 250–450)
TRANSFERRIN SERPL-MCNC: 110 MG/DL (ref 200–375)
WBC # BLD AUTO: 7.53 K/UL (ref 3.9–12.7)

## 2021-07-08 PROCEDURE — 86900 BLOOD TYPING SEROLOGIC ABO: CPT | Performed by: STUDENT IN AN ORGANIZED HEALTH CARE EDUCATION/TRAINING PROGRAM

## 2021-07-08 PROCEDURE — 63600175 PHARM REV CODE 636 W HCPCS: Performed by: STUDENT IN AN ORGANIZED HEALTH CARE EDUCATION/TRAINING PROGRAM

## 2021-07-08 PROCEDURE — 85027 COMPLETE CBC AUTOMATED: CPT | Performed by: STUDENT IN AN ORGANIZED HEALTH CARE EDUCATION/TRAINING PROGRAM

## 2021-07-08 PROCEDURE — 97535 SELF CARE MNGMENT TRAINING: CPT | Mod: CO

## 2021-07-08 PROCEDURE — 27000221 HC OXYGEN, UP TO 24 HOURS

## 2021-07-08 PROCEDURE — 80048 BASIC METABOLIC PNL TOTAL CA: CPT | Performed by: STUDENT IN AN ORGANIZED HEALTH CARE EDUCATION/TRAINING PROGRAM

## 2021-07-08 PROCEDURE — 25000003 PHARM REV CODE 250: Performed by: INTERNAL MEDICINE

## 2021-07-08 PROCEDURE — 12000002 HC ACUTE/MED SURGE SEMI-PRIVATE ROOM

## 2021-07-08 PROCEDURE — 97530 THERAPEUTIC ACTIVITIES: CPT

## 2021-07-08 PROCEDURE — 94640 AIRWAY INHALATION TREATMENT: CPT

## 2021-07-08 PROCEDURE — 99900035 HC TECH TIME PER 15 MIN (STAT)

## 2021-07-08 PROCEDURE — 86870 RBC ANTIBODY IDENTIFICATION: CPT | Performed by: STUDENT IN AN ORGANIZED HEALTH CARE EDUCATION/TRAINING PROGRAM

## 2021-07-08 PROCEDURE — 82962 GLUCOSE BLOOD TEST: CPT

## 2021-07-08 PROCEDURE — 25000003 PHARM REV CODE 250: Performed by: NURSE PRACTITIONER

## 2021-07-08 PROCEDURE — 83540 ASSAY OF IRON: CPT | Performed by: STUDENT IN AN ORGANIZED HEALTH CARE EDUCATION/TRAINING PROGRAM

## 2021-07-08 PROCEDURE — 99900031 HC PATIENT EDUCATION (STAT)

## 2021-07-08 PROCEDURE — 25000003 PHARM REV CODE 250: Performed by: STUDENT IN AN ORGANIZED HEALTH CARE EDUCATION/TRAINING PROGRAM

## 2021-07-08 PROCEDURE — 86922 COMPATIBILITY TEST ANTIGLOB: CPT | Performed by: STUDENT IN AN ORGANIZED HEALTH CARE EDUCATION/TRAINING PROGRAM

## 2021-07-08 PROCEDURE — 83735 ASSAY OF MAGNESIUM: CPT | Performed by: STUDENT IN AN ORGANIZED HEALTH CARE EDUCATION/TRAINING PROGRAM

## 2021-07-08 PROCEDURE — 94761 N-INVAS EAR/PLS OXIMETRY MLT: CPT

## 2021-07-08 PROCEDURE — 36415 COLL VENOUS BLD VENIPUNCTURE: CPT | Performed by: STUDENT IN AN ORGANIZED HEALTH CARE EDUCATION/TRAINING PROGRAM

## 2021-07-08 RX ORDER — TAMSULOSIN HYDROCHLORIDE 0.4 MG/1
0.4 CAPSULE ORAL DAILY
Qty: 30 CAPSULE | Refills: 0 | Status: SHIPPED | OUTPATIENT
Start: 2021-07-09 | End: 2022-01-01

## 2021-07-08 RX ORDER — HYDROCODONE BITARTRATE AND ACETAMINOPHEN 500; 5 MG/1; MG/1
TABLET ORAL
Status: DISCONTINUED | OUTPATIENT
Start: 2021-07-08 | End: 2021-07-09 | Stop reason: HOSPADM

## 2021-07-08 RX ADMIN — FERROUS SULFATE TAB 325 MG (65 MG ELEMENTAL FE) 325 MG: 325 (65 FE) TAB at 08:07

## 2021-07-08 RX ADMIN — HYDROCODONE BITARTRATE AND ACETAMINOPHEN 1 TABLET: 7.5; 325 TABLET ORAL at 03:07

## 2021-07-08 RX ADMIN — MONTELUKAST SODIUM 10 MG: 10 TABLET, COATED ORAL at 08:07

## 2021-07-08 RX ADMIN — LEVOTHYROXINE SODIUM 88 MCG: 88 TABLET ORAL at 05:07

## 2021-07-08 RX ADMIN — ALLOPURINOL 100 MG: 100 TABLET ORAL at 08:07

## 2021-07-08 RX ADMIN — SODIUM CHLORIDE 125 MG: 9 INJECTION, SOLUTION INTRAVENOUS at 11:07

## 2021-07-08 RX ADMIN — AMLODIPINE BESYLATE 5 MG: 5 TABLET ORAL at 08:07

## 2021-07-08 RX ADMIN — TRIAMCINOLONE ACETONIDE: 1 CREAM TOPICAL at 08:07

## 2021-07-08 RX ADMIN — POLYETHYLENE GLYCOL 3350 17 G: 17 POWDER, FOR SOLUTION ORAL at 08:07

## 2021-07-08 RX ADMIN — PANTOPRAZOLE SODIUM 40 MG: 40 TABLET, DELAYED RELEASE ORAL at 05:07

## 2021-07-08 RX ADMIN — SERTRALINE HYDROCHLORIDE 25 MG: 25 TABLET ORAL at 08:07

## 2021-07-08 RX ADMIN — FLUTICASONE FUROATE AND VILANTEROL TRIFENATATE 1 PUFF: 100; 25 POWDER RESPIRATORY (INHALATION) at 08:07

## 2021-07-08 RX ADMIN — FLUTICASONE PROPIONATE 100 MCG: 50 SPRAY, METERED NASAL at 08:07

## 2021-07-08 RX ADMIN — TAMSULOSIN HYDROCHLORIDE 0.4 MG: 0.4 CAPSULE ORAL at 08:07

## 2021-07-08 RX ADMIN — TIOTROPIUM BROMIDE INHALATION SPRAY 2 PUFF: 1.56 SPRAY, METERED RESPIRATORY (INHALATION) at 08:07

## 2021-07-08 RX ADMIN — HYDROCODONE BITARTRATE AND ACETAMINOPHEN 1 TABLET: 7.5; 325 TABLET ORAL at 10:07

## 2021-07-08 RX ADMIN — HYDROCODONE BITARTRATE AND ACETAMINOPHEN 1 TABLET: 7.5; 325 TABLET ORAL at 05:07

## 2021-07-08 RX ADMIN — MAGNESIUM OXIDE 400 MG: 400 TABLET ORAL at 08:07

## 2021-07-08 RX ADMIN — SODIUM CHLORIDE: 0.9 INJECTION, SOLUTION INTRAVENOUS at 10:07

## 2021-07-09 ENCOUNTER — PATIENT MESSAGE (OUTPATIENT)
Dept: FAMILY MEDICINE | Facility: CLINIC | Age: 86
End: 2021-07-09

## 2021-07-09 VITALS
WEIGHT: 140.88 LBS | BODY MASS INDEX: 24.96 KG/M2 | OXYGEN SATURATION: 99 % | RESPIRATION RATE: 20 BRPM | DIASTOLIC BLOOD PRESSURE: 54 MMHG | HEART RATE: 71 BPM | TEMPERATURE: 98 F | HEIGHT: 63 IN | SYSTOLIC BLOOD PRESSURE: 149 MMHG

## 2021-07-09 LAB
BLD PROD TYP BPU: NORMAL
BLOOD GROUP ANTIBODIES SERPL: NORMAL
BLOOD UNIT EXPIRATION DATE: NORMAL
BLOOD UNIT TYPE CODE: 9500
BLOOD UNIT TYPE: NORMAL
CODING SYSTEM: NORMAL
DISPENSE STATUS: NORMAL
GLUCOSE SERPL-MCNC: 116 MG/DL (ref 70–110)
GLUCOSE SERPL-MCNC: 135 MG/DL (ref 70–110)
GLUCOSE SERPL-MCNC: 164 MG/DL (ref 70–110)
HCT VFR BLD AUTO: 21.6 % (ref 37–48.5)
HGB BLD-MCNC: 7 G/DL (ref 12–16)
NUM UNITS TRANS PACKED RBC: NORMAL

## 2021-07-09 PROCEDURE — 36415 COLL VENOUS BLD VENIPUNCTURE: CPT | Performed by: STUDENT IN AN ORGANIZED HEALTH CARE EDUCATION/TRAINING PROGRAM

## 2021-07-09 PROCEDURE — 99900035 HC TECH TIME PER 15 MIN (STAT)

## 2021-07-09 PROCEDURE — P9040 RBC LEUKOREDUCED IRRADIATED: HCPCS | Performed by: STUDENT IN AN ORGANIZED HEALTH CARE EDUCATION/TRAINING PROGRAM

## 2021-07-09 PROCEDURE — 25000003 PHARM REV CODE 250: Performed by: NURSE PRACTITIONER

## 2021-07-09 PROCEDURE — 94761 N-INVAS EAR/PLS OXIMETRY MLT: CPT

## 2021-07-09 PROCEDURE — 97535 SELF CARE MNGMENT TRAINING: CPT

## 2021-07-09 PROCEDURE — 25000003 PHARM REV CODE 250: Performed by: INTERNAL MEDICINE

## 2021-07-09 PROCEDURE — 63600175 PHARM REV CODE 636 W HCPCS: Performed by: STUDENT IN AN ORGANIZED HEALTH CARE EDUCATION/TRAINING PROGRAM

## 2021-07-09 PROCEDURE — 99900031 HC PATIENT EDUCATION (STAT)

## 2021-07-09 PROCEDURE — 85018 HEMOGLOBIN: CPT | Performed by: STUDENT IN AN ORGANIZED HEALTH CARE EDUCATION/TRAINING PROGRAM

## 2021-07-09 PROCEDURE — 25000003 PHARM REV CODE 250: Performed by: STUDENT IN AN ORGANIZED HEALTH CARE EDUCATION/TRAINING PROGRAM

## 2021-07-09 PROCEDURE — 85014 HEMATOCRIT: CPT | Performed by: STUDENT IN AN ORGANIZED HEALTH CARE EDUCATION/TRAINING PROGRAM

## 2021-07-09 RX ORDER — ACETAMINOPHEN 500 MG
1000 TABLET ORAL ONCE
Status: COMPLETED | OUTPATIENT
Start: 2021-07-09 | End: 2021-07-09

## 2021-07-09 RX ORDER — DIPHENHYDRAMINE HYDROCHLORIDE 50 MG/ML
12.5 INJECTION INTRAMUSCULAR; INTRAVENOUS ONCE
Status: COMPLETED | OUTPATIENT
Start: 2021-07-09 | End: 2021-07-09

## 2021-07-09 RX ADMIN — TAMSULOSIN HYDROCHLORIDE 0.4 MG: 0.4 CAPSULE ORAL at 09:07

## 2021-07-09 RX ADMIN — FLUTICASONE PROPIONATE 100 MCG: 50 SPRAY, METERED NASAL at 09:07

## 2021-07-09 RX ADMIN — DIPHENHYDRAMINE HYDROCHLORIDE 12.5 MG: 50 INJECTION INTRAMUSCULAR; INTRAVENOUS at 01:07

## 2021-07-09 RX ADMIN — AMLODIPINE BESYLATE 5 MG: 5 TABLET ORAL at 09:07

## 2021-07-09 RX ADMIN — HYDROCODONE BITARTRATE AND ACETAMINOPHEN 1 TABLET: 7.5; 325 TABLET ORAL at 09:07

## 2021-07-09 RX ADMIN — TRIAMCINOLONE ACETONIDE: 1 CREAM TOPICAL at 09:07

## 2021-07-09 RX ADMIN — ALLOPURINOL 100 MG: 100 TABLET ORAL at 09:07

## 2021-07-09 RX ADMIN — LEVOTHYROXINE SODIUM 88 MCG: 88 TABLET ORAL at 05:07

## 2021-07-09 RX ADMIN — PANTOPRAZOLE SODIUM 40 MG: 40 TABLET, DELAYED RELEASE ORAL at 05:07

## 2021-07-09 RX ADMIN — POLYETHYLENE GLYCOL 3350 17 G: 17 POWDER, FOR SOLUTION ORAL at 09:07

## 2021-07-09 RX ADMIN — ACETAMINOPHEN 1000 MG: 500 TABLET, FILM COATED ORAL at 01:07

## 2021-07-09 RX ADMIN — RIVAROXABAN 10 MG: 10 TABLET, FILM COATED ORAL at 05:07

## 2021-07-09 RX ADMIN — MAGNESIUM OXIDE 400 MG: 400 TABLET ORAL at 09:07

## 2021-07-09 RX ADMIN — SERTRALINE HYDROCHLORIDE 25 MG: 25 TABLET ORAL at 09:07

## 2021-07-09 RX ADMIN — HYDROCODONE BITARTRATE AND ACETAMINOPHEN 1 TABLET: 7.5; 325 TABLET ORAL at 06:07

## 2021-07-09 RX ADMIN — FERROUS SULFATE TAB 325 MG (65 MG ELEMENTAL FE) 325 MG: 325 (65 FE) TAB at 09:07

## 2021-07-13 ENCOUNTER — APPOINTMENT (OUTPATIENT)
Dept: LAB | Facility: HOSPITAL | Age: 86
End: 2021-07-13
Attending: FAMILY MEDICINE
Payer: MEDICARE

## 2021-07-13 DIAGNOSIS — N39.0 UTI (URINARY TRACT INFECTION): Primary | ICD-10-CM

## 2021-07-13 LAB
BACTERIA #/AREA URNS HPF: ABNORMAL /HPF
BILIRUB UR QL STRIP: NEGATIVE
CLARITY UR: ABNORMAL
COLOR UR: YELLOW
GLUCOSE UR QL STRIP: NEGATIVE
HGB UR QL STRIP: ABNORMAL
HYALINE CASTS #/AREA URNS LPF: 0 /LPF
KETONES UR QL STRIP: NEGATIVE
LEUKOCYTE ESTERASE UR QL STRIP: ABNORMAL
MICROSCOPIC COMMENT: ABNORMAL
NITRITE UR QL STRIP: NEGATIVE
PH UR STRIP: 6 [PH] (ref 5–8)
PROT UR QL STRIP: ABNORMAL
RBC #/AREA URNS HPF: 11 /HPF (ref 0–4)
SP GR UR STRIP: 1.01 (ref 1–1.03)
URN SPEC COLLECT METH UR: ABNORMAL
UROBILINOGEN UR STRIP-ACNC: 1 EU/DL
WBC #/AREA URNS HPF: 50 /HPF (ref 0–5)
WBC CASTS #/AREA URNS LPF: 11 /LPF

## 2021-07-13 PROCEDURE — 87186 SC STD MICRODIL/AGAR DIL: CPT | Performed by: FAMILY MEDICINE

## 2021-07-13 PROCEDURE — 81000 URINALYSIS NONAUTO W/SCOPE: CPT | Performed by: FAMILY MEDICINE

## 2021-07-13 PROCEDURE — 87086 URINE CULTURE/COLONY COUNT: CPT | Performed by: FAMILY MEDICINE

## 2021-07-13 PROCEDURE — 87088 URINE BACTERIA CULTURE: CPT | Performed by: FAMILY MEDICINE

## 2021-07-13 PROCEDURE — 87077 CULTURE AEROBIC IDENTIFY: CPT | Performed by: FAMILY MEDICINE

## 2021-07-16 ENCOUNTER — HOSPITAL ENCOUNTER (OUTPATIENT)
Dept: CARDIOLOGY | Facility: CLINIC | Age: 86
Discharge: HOME | End: 2021-07-16
Attending: INTERNAL MEDICINE
Payer: MEDICARE

## 2021-07-16 DIAGNOSIS — Z95.818 PRESENCE OF OTHER CARDIAC IMPLANTS AND GRAFTS: ICD-10-CM

## 2021-07-16 DIAGNOSIS — I51.89 LEFT VENTRICULAR DIASTOLIC DYSFUNCTION WITH PRESERVED SYSTOLIC FUNCTION: ICD-10-CM

## 2021-07-16 LAB — BACTERIA UR CULT: ABNORMAL

## 2021-07-16 PROCEDURE — 93298 REM INTERROG DEV EVAL SCRMS: CPT | Mod: S$GLB,,, | Performed by: INTERNAL MEDICINE

## 2021-07-16 PROCEDURE — 93298 CARDIAC DEVICE CHECK - REMOTE: ICD-10-PCS | Mod: S$GLB,,, | Performed by: INTERNAL MEDICINE

## 2021-07-20 ENCOUNTER — LAB VISIT (OUTPATIENT)
Dept: LAB | Facility: OTHER | Age: 86
End: 2021-07-20
Payer: MEDICARE

## 2021-07-20 DIAGNOSIS — Z20.822 ENCOUNTER FOR LABORATORY TESTING FOR COVID-19 VIRUS: ICD-10-CM

## 2021-07-20 DIAGNOSIS — M25.551 RIGHT HIP PAIN: Primary | ICD-10-CM

## 2021-07-20 PROCEDURE — U0003 INFECTIOUS AGENT DETECTION BY NUCLEIC ACID (DNA OR RNA); SEVERE ACUTE RESPIRATORY SYNDROME CORONAVIRUS 2 (SARS-COV-2) (CORONAVIRUS DISEASE [COVID-19]), AMPLIFIED PROBE TECHNIQUE, MAKING USE OF HIGH THROUGHPUT TECHNOLOGIES AS DESCRIBED BY CMS-2020-01-R: HCPCS | Performed by: NURSE PRACTITIONER

## 2021-07-22 LAB
SARS-COV-2 RNA RESP QL NAA+PROBE: NOT DETECTED
SARS-COV-2- CYCLE NUMBER: -1

## 2021-07-26 ENCOUNTER — PATIENT OUTREACH (OUTPATIENT)
Dept: ADMINISTRATIVE | Facility: OTHER | Age: 86
End: 2021-07-26

## 2021-07-27 ENCOUNTER — OFFICE VISIT (OUTPATIENT)
Dept: ORTHOPEDICS | Facility: CLINIC | Age: 86
End: 2021-07-27
Payer: MEDICARE

## 2021-07-27 ENCOUNTER — HOSPITAL ENCOUNTER (OUTPATIENT)
Dept: RADIOLOGY | Facility: HOSPITAL | Age: 86
Discharge: HOME | End: 2021-07-27
Attending: ORTHOPAEDIC SURGERY
Payer: MEDICARE

## 2021-07-27 VITALS — BODY MASS INDEX: 24.8 KG/M2 | RESPIRATION RATE: 17 BRPM | HEIGHT: 63 IN | WEIGHT: 140 LBS

## 2021-07-27 DIAGNOSIS — M25.551 RIGHT HIP PAIN: ICD-10-CM

## 2021-07-27 DIAGNOSIS — M25.552 HIP PAIN, LEFT: Primary | ICD-10-CM

## 2021-07-27 PROCEDURE — 1100F PR PT FALLS ASSESS DOC 2+ FALLS/FALL W/INJURY/YR: ICD-10-PCS | Mod: CPTII,S$GLB,, | Performed by: ORTHOPAEDIC SURGERY

## 2021-07-27 PROCEDURE — 1100F PTFALLS ASSESS-DOCD GE2>/YR: CPT | Mod: CPTII,S$GLB,, | Performed by: ORTHOPAEDIC SURGERY

## 2021-07-27 PROCEDURE — 73502 X-RAY EXAM HIP UNI 2-3 VIEWS: CPT | Mod: 26,RT,, | Performed by: RADIOLOGY

## 2021-07-27 PROCEDURE — 1111F DSCHRG MED/CURRENT MED MERGE: CPT | Mod: CPTII,S$GLB,, | Performed by: ORTHOPAEDIC SURGERY

## 2021-07-27 PROCEDURE — 1157F ADVNC CARE PLAN IN RCRD: CPT | Mod: CPTII,S$GLB,, | Performed by: ORTHOPAEDIC SURGERY

## 2021-07-27 PROCEDURE — 1111F PR DISCHARGE MEDS RECONCILED W/ CURRENT OUTPATIENT MED LIST: ICD-10-PCS | Mod: CPTII,S$GLB,, | Performed by: ORTHOPAEDIC SURGERY

## 2021-07-27 PROCEDURE — 99999 PR PBB SHADOW E&M-EST. PATIENT-LVL III: CPT | Mod: PBBFAC,,, | Performed by: ORTHOPAEDIC SURGERY

## 2021-07-27 PROCEDURE — 3288F FALL RISK ASSESSMENT DOCD: CPT | Mod: CPTII,S$GLB,, | Performed by: ORTHOPAEDIC SURGERY

## 2021-07-27 PROCEDURE — 73502 X-RAY EXAM HIP UNI 2-3 VIEWS: CPT | Mod: TC,PN,RT

## 2021-07-27 PROCEDURE — 1160F PR REVIEW ALL MEDS BY PRESCRIBER/CLIN PHARMACIST DOCUMENTED: ICD-10-PCS | Mod: CPTII,S$GLB,, | Performed by: ORTHOPAEDIC SURGERY

## 2021-07-27 PROCEDURE — 3288F PR FALLS RISK ASSESSMENT DOCUMENTED: ICD-10-PCS | Mod: CPTII,S$GLB,, | Performed by: ORTHOPAEDIC SURGERY

## 2021-07-27 PROCEDURE — 73502 XR HIP WITH PELVIS WHEN PERFORMED, 2 OR 3  VIEWS RIGHT: ICD-10-PCS | Mod: 26,RT,, | Performed by: RADIOLOGY

## 2021-07-27 PROCEDURE — 1157F PR ADVANCE CARE PLAN OR EQUIV PRESENT IN MEDICAL RECORD: ICD-10-PCS | Mod: CPTII,S$GLB,, | Performed by: ORTHOPAEDIC SURGERY

## 2021-07-27 PROCEDURE — 99999 PR PBB SHADOW E&M-EST. PATIENT-LVL III: ICD-10-PCS | Mod: PBBFAC,,, | Performed by: ORTHOPAEDIC SURGERY

## 2021-07-27 PROCEDURE — 99213 OFFICE O/P EST LOW 20 MIN: CPT | Mod: S$GLB,,, | Performed by: ORTHOPAEDIC SURGERY

## 2021-07-27 PROCEDURE — 1125F PR PAIN SEVERITY QUANTIFIED, PAIN PRESENT: ICD-10-PCS | Mod: CPTII,S$GLB,, | Performed by: ORTHOPAEDIC SURGERY

## 2021-07-27 PROCEDURE — 99499 RISK ADDL DX/OHS AUDIT: ICD-10-PCS | Mod: S$GLB,,, | Performed by: ORTHOPAEDIC SURGERY

## 2021-07-27 PROCEDURE — 1159F MED LIST DOCD IN RCRD: CPT | Mod: CPTII,S$GLB,, | Performed by: ORTHOPAEDIC SURGERY

## 2021-07-27 PROCEDURE — 99499 UNLISTED E&M SERVICE: CPT | Mod: S$GLB,,, | Performed by: ORTHOPAEDIC SURGERY

## 2021-07-27 PROCEDURE — 1160F RVW MEDS BY RX/DR IN RCRD: CPT | Mod: CPTII,S$GLB,, | Performed by: ORTHOPAEDIC SURGERY

## 2021-07-27 PROCEDURE — 99213 PR OFFICE/OUTPT VISIT, EST, LEVL III, 20-29 MIN: ICD-10-PCS | Mod: S$GLB,,, | Performed by: ORTHOPAEDIC SURGERY

## 2021-07-27 PROCEDURE — 1125F AMNT PAIN NOTED PAIN PRSNT: CPT | Mod: CPTII,S$GLB,, | Performed by: ORTHOPAEDIC SURGERY

## 2021-07-27 PROCEDURE — 1159F PR MEDICATION LIST DOCUMENTED IN MEDICAL RECORD: ICD-10-PCS | Mod: CPTII,S$GLB,, | Performed by: ORTHOPAEDIC SURGERY

## 2021-08-03 ENCOUNTER — HOSPITAL ENCOUNTER (EMERGENCY)
Facility: HOSPITAL | Age: 86
Discharge: HOME | End: 2021-08-03
Attending: EMERGENCY MEDICINE
Payer: MEDICARE

## 2021-08-03 VITALS
OXYGEN SATURATION: 99 % | SYSTOLIC BLOOD PRESSURE: 176 MMHG | HEIGHT: 63 IN | WEIGHT: 140 LBS | DIASTOLIC BLOOD PRESSURE: 73 MMHG | BODY MASS INDEX: 24.8 KG/M2 | TEMPERATURE: 98 F | HEART RATE: 81 BPM | RESPIRATION RATE: 33 BRPM

## 2021-08-03 DIAGNOSIS — N39.0 URINARY TRACT INFECTION WITHOUT HEMATURIA, SITE UNSPECIFIED: Primary | ICD-10-CM

## 2021-08-03 DIAGNOSIS — M25.559 HIP PAIN: ICD-10-CM

## 2021-08-03 DIAGNOSIS — D64.9 ANEMIA: ICD-10-CM

## 2021-08-03 DIAGNOSIS — S72.009A HIP FRACTURE: ICD-10-CM

## 2021-08-03 LAB
ABO + RH BLD: NORMAL
ALBUMIN SERPL BCP-MCNC: 2.9 G/DL (ref 3.5–5.2)
ALP SERPL-CCNC: 170 U/L (ref 55–135)
ALT SERPL W/O P-5'-P-CCNC: 54 U/L (ref 10–44)
ANION GAP SERPL CALC-SCNC: 9 MMOL/L (ref 8–16)
APTT PPP: 40.1 SEC (ref 25.6–35.8)
AST SERPL-CCNC: 61 U/L (ref 10–40)
BACTERIA #/AREA URNS HPF: ABNORMAL /HPF
BASOPHILS # BLD AUTO: 0.04 K/UL (ref 0–0.2)
BASOPHILS NFR BLD: 0.5 % (ref 0–1.9)
BILIRUB SERPL-MCNC: 0.8 MG/DL (ref 0.1–1)
BILIRUB UR QL STRIP: NEGATIVE
BLD GP AB SCN CELLS X3 SERPL QL: NORMAL
BUN SERPL-MCNC: 34 MG/DL (ref 10–30)
CALCIUM SERPL-MCNC: 8.1 MG/DL (ref 8.7–10.5)
CHLORIDE SERPL-SCNC: 105 MMOL/L (ref 95–110)
CLARITY UR: ABNORMAL
CO2 SERPL-SCNC: 23 MMOL/L (ref 23–29)
COLOR UR: YELLOW
CREAT SERPL-MCNC: 1.4 MG/DL (ref 0.5–1.4)
DIFFERENTIAL METHOD: ABNORMAL
EOSINOPHIL # BLD AUTO: 0.4 K/UL (ref 0–0.5)
EOSINOPHIL NFR BLD: 4 % (ref 0–8)
ERYTHROCYTE [DISTWIDTH] IN BLOOD BY AUTOMATED COUNT: 18.6 % (ref 11.5–14.5)
EST. GFR  (AFRICAN AMERICAN): 37.9 ML/MIN/1.73 M^2
EST. GFR  (NON AFRICAN AMERICAN): 32.9 ML/MIN/1.73 M^2
GLUCOSE SERPL-MCNC: 146 MG/DL (ref 70–110)
GLUCOSE UR QL STRIP: NEGATIVE
HCT VFR BLD AUTO: 26.4 % (ref 37–48.5)
HGB BLD-MCNC: 8.6 G/DL (ref 12–16)
HGB UR QL STRIP: ABNORMAL
HYALINE CASTS #/AREA URNS LPF: 10 /LPF
IMM GRANULOCYTES # BLD AUTO: 0.07 K/UL (ref 0–0.04)
IMM GRANULOCYTES NFR BLD AUTO: 0.8 % (ref 0–0.5)
INR PPP: 1.3
KETONES UR QL STRIP: NEGATIVE
LEUKOCYTE ESTERASE UR QL STRIP: ABNORMAL
LYMPHOCYTES # BLD AUTO: 1.2 K/UL (ref 1–4.8)
LYMPHOCYTES NFR BLD: 13.1 % (ref 18–48)
MCH RBC QN AUTO: 32.8 PG (ref 27–31)
MCHC RBC AUTO-ENTMCNC: 32.6 G/DL (ref 32–36)
MCV RBC AUTO: 101 FL (ref 82–98)
MICROSCOPIC COMMENT: ABNORMAL
MONOCYTES # BLD AUTO: 1.1 K/UL (ref 0.3–1)
MONOCYTES NFR BLD: 12.6 % (ref 4–15)
NEUTROPHILS # BLD AUTO: 6.1 K/UL (ref 1.8–7.7)
NEUTROPHILS NFR BLD: 69 % (ref 38–73)
NITRITE UR QL STRIP: NEGATIVE
NRBC BLD-RTO: 0 /100 WBC
PH UR STRIP: 6 [PH] (ref 5–8)
PLATELET # BLD AUTO: 181 K/UL (ref 150–450)
PMV BLD AUTO: 11.2 FL (ref 9.2–12.9)
POTASSIUM SERPL-SCNC: 4.4 MMOL/L (ref 3.5–5.1)
PROT SERPL-MCNC: 5.9 G/DL (ref 6–8.4)
PROT UR QL STRIP: ABNORMAL
PROTHROMBIN TIME: 15.2 SEC (ref 11.8–14.3)
RBC # BLD AUTO: 2.62 M/UL (ref 4–5.4)
RBC #/AREA URNS HPF: 1 /HPF (ref 0–4)
SARS-COV-2 RDRP RESP QL NAA+PROBE: NEGATIVE
SODIUM SERPL-SCNC: 137 MMOL/L (ref 136–145)
SP GR UR STRIP: 1.01 (ref 1–1.03)
SQUAMOUS #/AREA URNS HPF: 0 /HPF
URN SPEC COLLECT METH UR: ABNORMAL
UROBILINOGEN UR STRIP-ACNC: NEGATIVE EU/DL
WBC # BLD AUTO: 8.81 K/UL (ref 3.9–12.7)
WBC #/AREA URNS HPF: 100 /HPF (ref 0–5)

## 2021-08-03 PROCEDURE — 93010 ELECTROCARDIOGRAM REPORT: CPT | Mod: ,,, | Performed by: GENERAL PRACTICE

## 2021-08-03 PROCEDURE — 93005 ELECTROCARDIOGRAM TRACING: CPT | Performed by: GENERAL PRACTICE

## 2021-08-03 PROCEDURE — U0002 COVID-19 LAB TEST NON-CDC: HCPCS | Performed by: EMERGENCY MEDICINE

## 2021-08-03 PROCEDURE — 85730 THROMBOPLASTIN TIME PARTIAL: CPT | Performed by: EMERGENCY MEDICINE

## 2021-08-03 PROCEDURE — 87077 CULTURE AEROBIC IDENTIFY: CPT | Performed by: EMERGENCY MEDICINE

## 2021-08-03 PROCEDURE — 63600175 PHARM REV CODE 636 W HCPCS: Performed by: EMERGENCY MEDICINE

## 2021-08-03 PROCEDURE — 93010 EKG 12-LEAD: ICD-10-PCS | Mod: ,,, | Performed by: GENERAL PRACTICE

## 2021-08-03 PROCEDURE — 96365 THER/PROPH/DIAG IV INF INIT: CPT

## 2021-08-03 PROCEDURE — 87186 SC STD MICRODIL/AGAR DIL: CPT | Performed by: EMERGENCY MEDICINE

## 2021-08-03 PROCEDURE — 80053 COMPREHEN METABOLIC PANEL: CPT | Performed by: EMERGENCY MEDICINE

## 2021-08-03 PROCEDURE — 81001 URINALYSIS AUTO W/SCOPE: CPT | Performed by: EMERGENCY MEDICINE

## 2021-08-03 PROCEDURE — 99285 EMERGENCY DEPT VISIT HI MDM: CPT | Mod: 25

## 2021-08-03 PROCEDURE — 85025 COMPLETE CBC W/AUTO DIFF WBC: CPT | Performed by: EMERGENCY MEDICINE

## 2021-08-03 PROCEDURE — 87086 URINE CULTURE/COLONY COUNT: CPT | Performed by: EMERGENCY MEDICINE

## 2021-08-03 PROCEDURE — 85610 PROTHROMBIN TIME: CPT | Performed by: EMERGENCY MEDICINE

## 2021-08-03 PROCEDURE — 86900 BLOOD TYPING SEROLOGIC ABO: CPT | Performed by: EMERGENCY MEDICINE

## 2021-08-03 RX ORDER — CEFUROXIME AXETIL 250 MG/1
250 TABLET ORAL 2 TIMES DAILY
Qty: 20 TABLET | Refills: 0 | Status: SHIPPED | OUTPATIENT
Start: 2021-08-03 | End: 2021-08-13

## 2021-08-03 RX ADMIN — CEFTRIAXONE 1 G: 1 INJECTION, SOLUTION INTRAVENOUS at 05:08

## 2021-08-05 LAB — BACTERIA UR CULT: ABNORMAL

## 2021-08-06 ENCOUNTER — TELEPHONE (OUTPATIENT)
Dept: PULMONOLOGY | Facility: CLINIC | Age: 86
End: 2021-08-06

## 2021-08-10 ENCOUNTER — TELEPHONE (OUTPATIENT)
Dept: PULMONOLOGY | Facility: CLINIC | Age: 86
End: 2021-08-10

## 2021-08-11 DIAGNOSIS — M25.551 RIGHT HIP PAIN: Primary | ICD-10-CM

## 2021-08-17 ENCOUNTER — OFFICE VISIT (OUTPATIENT)
Dept: ORTHOPEDICS | Facility: CLINIC | Age: 86
End: 2021-08-17
Payer: MEDICARE

## 2021-08-17 ENCOUNTER — HOSPITAL ENCOUNTER (OUTPATIENT)
Dept: RADIOLOGY | Facility: HOSPITAL | Age: 86
Discharge: HOME | End: 2021-08-17
Attending: ORTHOPAEDIC SURGERY
Payer: MEDICARE

## 2021-08-17 VITALS — BODY MASS INDEX: 22.5 KG/M2 | WEIGHT: 140 LBS | RESPIRATION RATE: 16 BRPM | HEIGHT: 66 IN

## 2021-08-17 DIAGNOSIS — M25.551 RIGHT HIP PAIN: Primary | ICD-10-CM

## 2021-08-17 DIAGNOSIS — M25.551 RIGHT HIP PAIN: ICD-10-CM

## 2021-08-17 PROCEDURE — 73502 XR HIP WITH PELVIS WHEN PERFORMED, 2 OR 3  VIEWS RIGHT: ICD-10-PCS | Mod: 26,RT,, | Performed by: RADIOLOGY

## 2021-08-17 PROCEDURE — 1160F PR REVIEW ALL MEDS BY PRESCRIBER/CLIN PHARMACIST DOCUMENTED: ICD-10-PCS | Mod: CPTII,S$GLB,, | Performed by: ORTHOPAEDIC SURGERY

## 2021-08-17 PROCEDURE — 99213 PR OFFICE/OUTPT VISIT, EST, LEVL III, 20-29 MIN: ICD-10-PCS | Mod: S$GLB,,, | Performed by: ORTHOPAEDIC SURGERY

## 2021-08-17 PROCEDURE — 99999 PR PBB SHADOW E&M-EST. PATIENT-LVL V: ICD-10-PCS | Mod: PBBFAC,,, | Performed by: ORTHOPAEDIC SURGERY

## 2021-08-17 PROCEDURE — 73502 X-RAY EXAM HIP UNI 2-3 VIEWS: CPT | Mod: TC,PN,RT

## 2021-08-17 PROCEDURE — 1101F PR PT FALLS ASSESS DOC 0-1 FALLS W/OUT INJ PAST YR: ICD-10-PCS | Mod: CPTII,S$GLB,, | Performed by: ORTHOPAEDIC SURGERY

## 2021-08-17 PROCEDURE — 1160F RVW MEDS BY RX/DR IN RCRD: CPT | Mod: CPTII,S$GLB,, | Performed by: ORTHOPAEDIC SURGERY

## 2021-08-17 PROCEDURE — 3288F PR FALLS RISK ASSESSMENT DOCUMENTED: ICD-10-PCS | Mod: CPTII,S$GLB,, | Performed by: ORTHOPAEDIC SURGERY

## 2021-08-17 PROCEDURE — 1126F AMNT PAIN NOTED NONE PRSNT: CPT | Mod: CPTII,S$GLB,, | Performed by: ORTHOPAEDIC SURGERY

## 2021-08-17 PROCEDURE — 1159F PR MEDICATION LIST DOCUMENTED IN MEDICAL RECORD: ICD-10-PCS | Mod: CPTII,S$GLB,, | Performed by: ORTHOPAEDIC SURGERY

## 2021-08-17 PROCEDURE — 1101F PT FALLS ASSESS-DOCD LE1/YR: CPT | Mod: CPTII,S$GLB,, | Performed by: ORTHOPAEDIC SURGERY

## 2021-08-17 PROCEDURE — 1157F PR ADVANCE CARE PLAN OR EQUIV PRESENT IN MEDICAL RECORD: ICD-10-PCS | Mod: CPTII,S$GLB,, | Performed by: ORTHOPAEDIC SURGERY

## 2021-08-17 PROCEDURE — 99213 OFFICE O/P EST LOW 20 MIN: CPT | Mod: S$GLB,,, | Performed by: ORTHOPAEDIC SURGERY

## 2021-08-17 PROCEDURE — 99999 PR PBB SHADOW E&M-EST. PATIENT-LVL V: CPT | Mod: PBBFAC,,, | Performed by: ORTHOPAEDIC SURGERY

## 2021-08-17 PROCEDURE — 73502 X-RAY EXAM HIP UNI 2-3 VIEWS: CPT | Mod: 26,RT,, | Performed by: RADIOLOGY

## 2021-08-17 PROCEDURE — 1159F MED LIST DOCD IN RCRD: CPT | Mod: CPTII,S$GLB,, | Performed by: ORTHOPAEDIC SURGERY

## 2021-08-17 PROCEDURE — 3288F FALL RISK ASSESSMENT DOCD: CPT | Mod: CPTII,S$GLB,, | Performed by: ORTHOPAEDIC SURGERY

## 2021-08-17 PROCEDURE — 1157F ADVNC CARE PLAN IN RCRD: CPT | Mod: CPTII,S$GLB,, | Performed by: ORTHOPAEDIC SURGERY

## 2021-08-17 PROCEDURE — 1126F PR PAIN SEVERITY QUANTIFIED, NO PAIN PRESENT: ICD-10-PCS | Mod: CPTII,S$GLB,, | Performed by: ORTHOPAEDIC SURGERY

## 2021-08-22 PROCEDURE — G0180 MD CERTIFICATION HHA PATIENT: HCPCS | Mod: ,,, | Performed by: FAMILY MEDICINE

## 2021-08-22 PROCEDURE — G0180 PR HOME HEALTH MD CERTIFICATION: ICD-10-PCS | Mod: ,,, | Performed by: FAMILY MEDICINE

## 2021-08-26 ENCOUNTER — OFFICE VISIT (OUTPATIENT)
Dept: UROLOGY | Facility: CLINIC | Age: 86
End: 2021-08-26
Payer: MEDICARE

## 2021-08-26 ENCOUNTER — TELEPHONE (OUTPATIENT)
Dept: FAMILY MEDICINE | Facility: CLINIC | Age: 86
End: 2021-08-26

## 2021-08-26 VITALS — HEART RATE: 72 BPM | SYSTOLIC BLOOD PRESSURE: 153 MMHG | DIASTOLIC BLOOD PRESSURE: 55 MMHG

## 2021-08-26 DIAGNOSIS — Z87.898 HISTORY OF URINARY RETENTION: ICD-10-CM

## 2021-08-26 DIAGNOSIS — N39.0 RECURRENT UTI (URINARY TRACT INFECTION): Primary | ICD-10-CM

## 2021-08-26 PROCEDURE — 99214 OFFICE O/P EST MOD 30 MIN: CPT | Mod: S$GLB,,, | Performed by: NURSE PRACTITIONER

## 2021-08-26 PROCEDURE — 99999 PR PBB SHADOW E&M-EST. PATIENT-LVL IV: ICD-10-PCS | Mod: PBBFAC,,, | Performed by: NURSE PRACTITIONER

## 2021-08-26 PROCEDURE — 1157F PR ADVANCE CARE PLAN OR EQUIV PRESENT IN MEDICAL RECORD: ICD-10-PCS | Mod: CPTII,S$GLB,, | Performed by: NURSE PRACTITIONER

## 2021-08-26 PROCEDURE — 1160F PR REVIEW ALL MEDS BY PRESCRIBER/CLIN PHARMACIST DOCUMENTED: ICD-10-PCS | Mod: CPTII,S$GLB,, | Performed by: NURSE PRACTITIONER

## 2021-08-26 PROCEDURE — 1159F MED LIST DOCD IN RCRD: CPT | Mod: CPTII,S$GLB,, | Performed by: NURSE PRACTITIONER

## 2021-08-26 PROCEDURE — 99214 PR OFFICE/OUTPT VISIT, EST, LEVL IV, 30-39 MIN: ICD-10-PCS | Mod: S$GLB,,, | Performed by: NURSE PRACTITIONER

## 2021-08-26 PROCEDURE — 1157F ADVNC CARE PLAN IN RCRD: CPT | Mod: CPTII,S$GLB,, | Performed by: NURSE PRACTITIONER

## 2021-08-26 PROCEDURE — 99999 PR PBB SHADOW E&M-EST. PATIENT-LVL IV: CPT | Mod: PBBFAC,,, | Performed by: NURSE PRACTITIONER

## 2021-08-26 PROCEDURE — 1160F RVW MEDS BY RX/DR IN RCRD: CPT | Mod: CPTII,S$GLB,, | Performed by: NURSE PRACTITIONER

## 2021-08-26 PROCEDURE — 1159F PR MEDICATION LIST DOCUMENTED IN MEDICAL RECORD: ICD-10-PCS | Mod: CPTII,S$GLB,, | Performed by: NURSE PRACTITIONER

## 2021-08-31 ENCOUNTER — TELEPHONE (OUTPATIENT)
Dept: FAMILY MEDICINE | Facility: CLINIC | Age: 86
End: 2021-08-31

## 2021-09-07 ENCOUNTER — TELEPHONE (OUTPATIENT)
Dept: FAMILY MEDICINE | Facility: CLINIC | Age: 86
End: 2021-09-07

## 2021-09-07 ENCOUNTER — TELEPHONE (OUTPATIENT)
Dept: CARDIOLOGY | Facility: CLINIC | Age: 86
End: 2021-09-07

## 2021-09-07 ENCOUNTER — LAB VISIT (OUTPATIENT)
Dept: LAB | Facility: HOSPITAL | Age: 86
End: 2021-09-07
Attending: NURSE PRACTITIONER
Payer: MEDICARE

## 2021-09-07 DIAGNOSIS — Z87.898 HISTORY OF URINARY RETENTION: ICD-10-CM

## 2021-09-07 DIAGNOSIS — N39.0 RECURRENT UTI (URINARY TRACT INFECTION): ICD-10-CM

## 2021-09-07 PROCEDURE — 87088 URINE BACTERIA CULTURE: CPT | Performed by: NURSE PRACTITIONER

## 2021-09-07 PROCEDURE — 87077 CULTURE AEROBIC IDENTIFY: CPT | Performed by: NURSE PRACTITIONER

## 2021-09-07 PROCEDURE — 87186 SC STD MICRODIL/AGAR DIL: CPT | Performed by: NURSE PRACTITIONER

## 2021-09-07 PROCEDURE — 87086 URINE CULTURE/COLONY COUNT: CPT | Performed by: NURSE PRACTITIONER

## 2021-09-09 LAB — BACTERIA UR CULT: ABNORMAL

## 2021-09-10 DIAGNOSIS — K31.819 ANGIODYSPLASIA OF STOMACH AND DUODENUM: Primary | ICD-10-CM

## 2021-09-10 RX ORDER — AMOXICILLIN AND CLAVULANATE POTASSIUM 500; 125 MG/1; MG/1
1 TABLET, FILM COATED ORAL 2 TIMES DAILY
Qty: 20 TABLET | Refills: 0 | Status: ON HOLD | OUTPATIENT
Start: 2021-09-10 | End: 2021-09-24 | Stop reason: HOSPADM

## 2021-09-10 RX ORDER — FLUCONAZOLE 150 MG/1
150 TABLET ORAL DAILY
Qty: 1 TABLET | Refills: 0 | Status: SHIPPED | OUTPATIENT
Start: 2021-09-10 | End: 2021-09-11

## 2021-09-14 ENCOUNTER — OFFICE VISIT (OUTPATIENT)
Dept: HEMATOLOGY/ONCOLOGY | Facility: CLINIC | Age: 86
End: 2021-09-14
Payer: MEDICARE

## 2021-09-14 ENCOUNTER — HOSPITAL ENCOUNTER (OUTPATIENT)
Dept: PREADMISSION TESTING | Facility: HOSPITAL | Age: 86
Discharge: HOME | End: 2021-09-14
Attending: INTERNAL MEDICINE
Payer: MEDICARE

## 2021-09-14 VITALS — HEIGHT: 63 IN | BODY MASS INDEX: 24.8 KG/M2 | RESPIRATION RATE: 16 BRPM

## 2021-09-14 DIAGNOSIS — D50.9 IRON DEFICIENCY ANEMIA, UNSPECIFIED IRON DEFICIENCY ANEMIA TYPE: ICD-10-CM

## 2021-09-14 DIAGNOSIS — D63.1 ANEMIA, CHRONIC RENAL FAILURE, STAGE 3 (MODERATE): ICD-10-CM

## 2021-09-14 DIAGNOSIS — D64.89 ANEMIA DUE TO MULTIPLE MECHANISMS: ICD-10-CM

## 2021-09-14 DIAGNOSIS — D53.9 NUTRITIONAL ANEMIA, UNSPECIFIED: ICD-10-CM

## 2021-09-14 DIAGNOSIS — N18.30 ANEMIA DUE TO STAGE 3 CHRONIC KIDNEY DISEASE TREATED WITH ERYTHROPOIETIN: ICD-10-CM

## 2021-09-14 DIAGNOSIS — Z86.718 PERSONAL HISTORY OF THROMBOEMBOLIC DISEASE: Chronic | ICD-10-CM

## 2021-09-14 DIAGNOSIS — Z01.818 PREOP TESTING: ICD-10-CM

## 2021-09-14 DIAGNOSIS — D64.9 CHRONIC ANEMIA: ICD-10-CM

## 2021-09-14 DIAGNOSIS — F17.200 SMOKER: ICD-10-CM

## 2021-09-14 DIAGNOSIS — D50.0 IRON DEFICIENCY ANEMIA DUE TO CHRONIC BLOOD LOSS: ICD-10-CM

## 2021-09-14 DIAGNOSIS — Z79.01 CHRONIC ANTICOAGULATION: Primary | ICD-10-CM

## 2021-09-14 DIAGNOSIS — D64.9 NORMOCYTIC NORMOCHROMIC ANEMIA: ICD-10-CM

## 2021-09-14 DIAGNOSIS — D63.1 ANEMIA DUE TO STAGE 3 CHRONIC KIDNEY DISEASE TREATED WITH ERYTHROPOIETIN: ICD-10-CM

## 2021-09-14 DIAGNOSIS — D63.8 ANEMIA OF CHRONIC DISEASE: ICD-10-CM

## 2021-09-14 DIAGNOSIS — D53.9 MACROCYTIC ANEMIA: ICD-10-CM

## 2021-09-14 DIAGNOSIS — K92.2 GASTROINTESTINAL HEMORRHAGE, UNSPECIFIED GASTROINTESTINAL HEMORRHAGE TYPE: ICD-10-CM

## 2021-09-14 DIAGNOSIS — I82.4Y2 DEEP VEIN THROMBOSIS (DVT) OF PROXIMAL VEIN OF LEFT LOWER EXTREMITY, UNSPECIFIED CHRONICITY: ICD-10-CM

## 2021-09-14 DIAGNOSIS — Z86.711 HISTORY OF PULMONARY EMBOLISM: ICD-10-CM

## 2021-09-14 DIAGNOSIS — Z15.89 HETEROZYGOUS MTHFR MUTATION C677T: ICD-10-CM

## 2021-09-14 DIAGNOSIS — N18.30 ANEMIA, CHRONIC RENAL FAILURE, STAGE 3 (MODERATE): ICD-10-CM

## 2021-09-14 LAB — SARS-COV-2 RDRP RESP QL NAA+PROBE: NEGATIVE

## 2021-09-14 PROCEDURE — 3288F FALL RISK ASSESSMENT DOCD: CPT | Mod: S$GLB,,, | Performed by: INTERNAL MEDICINE

## 2021-09-14 PROCEDURE — 99213 OFFICE O/P EST LOW 20 MIN: CPT | Mod: S$GLB,,, | Performed by: INTERNAL MEDICINE

## 2021-09-14 PROCEDURE — 1160F RVW MEDS BY RX/DR IN RCRD: CPT | Mod: S$GLB,,, | Performed by: INTERNAL MEDICINE

## 2021-09-14 PROCEDURE — 1126F PR PAIN SEVERITY QUANTIFIED, NO PAIN PRESENT: ICD-10-PCS | Mod: S$GLB,,, | Performed by: INTERNAL MEDICINE

## 2021-09-14 PROCEDURE — U0002 COVID-19 LAB TEST NON-CDC: HCPCS | Performed by: INTERNAL MEDICINE

## 2021-09-14 PROCEDURE — 1160F PR REVIEW ALL MEDS BY PRESCRIBER/CLIN PHARMACIST DOCUMENTED: ICD-10-PCS | Mod: S$GLB,,, | Performed by: INTERNAL MEDICINE

## 2021-09-14 PROCEDURE — 1100F PR PT FALLS ASSESS DOC 2+ FALLS/FALL W/INJURY/YR: ICD-10-PCS | Mod: S$GLB,,, | Performed by: INTERNAL MEDICINE

## 2021-09-14 PROCEDURE — 3288F PR FALLS RISK ASSESSMENT DOCUMENTED: ICD-10-PCS | Mod: S$GLB,,, | Performed by: INTERNAL MEDICINE

## 2021-09-14 PROCEDURE — 1100F PTFALLS ASSESS-DOCD GE2>/YR: CPT | Mod: S$GLB,,, | Performed by: INTERNAL MEDICINE

## 2021-09-14 PROCEDURE — 99213 PR OFFICE/OUTPT VISIT, EST, LEVL III, 20-29 MIN: ICD-10-PCS | Mod: S$GLB,,, | Performed by: INTERNAL MEDICINE

## 2021-09-14 PROCEDURE — 1157F ADVNC CARE PLAN IN RCRD: CPT | Mod: S$GLB,,, | Performed by: INTERNAL MEDICINE

## 2021-09-14 PROCEDURE — 1159F PR MEDICATION LIST DOCUMENTED IN MEDICAL RECORD: ICD-10-PCS | Mod: S$GLB,,, | Performed by: INTERNAL MEDICINE

## 2021-09-14 PROCEDURE — 1159F MED LIST DOCD IN RCRD: CPT | Mod: S$GLB,,, | Performed by: INTERNAL MEDICINE

## 2021-09-14 PROCEDURE — 1157F PR ADVANCE CARE PLAN OR EQUIV PRESENT IN MEDICAL RECORD: ICD-10-PCS | Mod: S$GLB,,, | Performed by: INTERNAL MEDICINE

## 2021-09-14 PROCEDURE — 1126F AMNT PAIN NOTED NONE PRSNT: CPT | Mod: S$GLB,,, | Performed by: INTERNAL MEDICINE

## 2021-09-16 ENCOUNTER — ANESTHESIA EVENT (OUTPATIENT)
Dept: SURGERY | Facility: HOSPITAL | Age: 86
End: 2021-09-16
Payer: MEDICARE

## 2021-09-16 ENCOUNTER — HOSPITAL ENCOUNTER (OUTPATIENT)
Facility: HOSPITAL | Age: 86
Discharge: HOME | End: 2021-09-16
Attending: INTERNAL MEDICINE | Admitting: INTERNAL MEDICINE
Payer: MEDICARE

## 2021-09-16 ENCOUNTER — ANESTHESIA (OUTPATIENT)
Dept: SURGERY | Facility: HOSPITAL | Age: 86
End: 2021-09-16
Payer: MEDICARE

## 2021-09-16 VITALS
DIASTOLIC BLOOD PRESSURE: 70 MMHG | WEIGHT: 140 LBS | TEMPERATURE: 98 F | HEART RATE: 62 BPM | HEIGHT: 63 IN | BODY MASS INDEX: 24.8 KG/M2 | SYSTOLIC BLOOD PRESSURE: 159 MMHG | OXYGEN SATURATION: 97 % | RESPIRATION RATE: 16 BRPM

## 2021-09-16 DIAGNOSIS — K31.819 ANGIODYSPLASIA OF STOMACH AND DUODENUM: ICD-10-CM

## 2021-09-16 PROCEDURE — 00813 ANES UPR LWR GI NDSC PX: CPT | Performed by: INTERNAL MEDICINE

## 2021-09-16 PROCEDURE — 37000009 HC ANESTHESIA EA ADD 15 MINS: Performed by: INTERNAL MEDICINE

## 2021-09-16 PROCEDURE — 43270 EGD LESION ABLATION: CPT | Performed by: INTERNAL MEDICINE

## 2021-09-16 PROCEDURE — 27202049 HC PROBE, APC ERBE: Performed by: INTERNAL MEDICINE

## 2021-09-16 PROCEDURE — 63600175 PHARM REV CODE 636 W HCPCS: Performed by: NURSE ANESTHETIST, CERTIFIED REGISTERED

## 2021-09-16 PROCEDURE — 37000008 HC ANESTHESIA 1ST 15 MINUTES: Performed by: INTERNAL MEDICINE

## 2021-09-16 PROCEDURE — 25000003 PHARM REV CODE 250: Performed by: NURSE ANESTHETIST, CERTIFIED REGISTERED

## 2021-09-16 RX ORDER — PROPOFOL 10 MG/ML
VIAL (ML) INTRAVENOUS
Status: DISCONTINUED | OUTPATIENT
Start: 2021-09-16 | End: 2021-09-16

## 2021-09-16 RX ADMIN — SODIUM CHLORIDE: 9 INJECTION, SOLUTION INTRAVENOUS at 10:09

## 2021-09-16 RX ADMIN — PROPOFOL 30 MG: 10 INJECTION, EMULSION INTRAVENOUS at 10:09

## 2021-09-16 RX ADMIN — PROPOFOL 50 MG: 10 INJECTION, EMULSION INTRAVENOUS at 10:09

## 2021-09-17 ENCOUNTER — TELEPHONE (OUTPATIENT)
Dept: SPORTS MEDICINE | Facility: CLINIC | Age: 86
End: 2021-09-17

## 2021-09-21 ENCOUNTER — HOSPITAL ENCOUNTER (INPATIENT)
Facility: HOSPITAL | Age: 86
LOS: 3 days | Discharge: HOME | DRG: 177 | End: 2021-09-24
Attending: EMERGENCY MEDICINE | Admitting: FAMILY MEDICINE
Payer: MEDICARE

## 2021-09-21 DIAGNOSIS — K92.2 GI BLEED: ICD-10-CM

## 2021-09-21 DIAGNOSIS — J96.21 ACUTE ON CHRONIC RESPIRATORY FAILURE WITH HYPOXIA: ICD-10-CM

## 2021-09-21 DIAGNOSIS — M25.551 RIGHT HIP PAIN: Primary | ICD-10-CM

## 2021-09-21 PROBLEM — K52.9 ACUTE COLITIS: Status: RESOLVED | Noted: 2020-10-25 | Resolved: 2021-09-21

## 2021-09-21 PROBLEM — I26.99 ACUTE PULMONARY EMBOLISM: Status: RESOLVED | Noted: 2018-06-09 | Resolved: 2021-09-21

## 2021-09-21 PROBLEM — U07.1 COVID-19: Status: ACTIVE | Noted: 2021-09-21

## 2021-09-21 LAB
ABO + RH BLD: NORMAL
ALBUMIN SERPL BCP-MCNC: 2.8 G/DL (ref 3.5–5.2)
ALP SERPL-CCNC: 69 U/L (ref 55–135)
ALT SERPL W/O P-5'-P-CCNC: 20 U/L (ref 10–44)
ANION GAP SERPL CALC-SCNC: 5 MMOL/L (ref 8–16)
AST SERPL-CCNC: 39 U/L (ref 10–40)
BACTERIA #/AREA URNS HPF: NEGATIVE /HPF
BASOPHILS # BLD AUTO: 0.02 K/UL (ref 0–0.2)
BASOPHILS NFR BLD: 0.3 % (ref 0–1.9)
BILIRUB SERPL-MCNC: 0.5 MG/DL (ref 0.1–1)
BILIRUB UR QL STRIP: NEGATIVE
BLD GP AB SCN CELLS X3 SERPL QL: NORMAL
BUN SERPL-MCNC: 39 MG/DL (ref 10–30)
CALCIUM SERPL-MCNC: 8.1 MG/DL (ref 8.7–10.5)
CHLORIDE SERPL-SCNC: 108 MMOL/L (ref 95–110)
CLARITY UR: ABNORMAL
CO2 SERPL-SCNC: 21 MMOL/L (ref 23–29)
COLOR UR: YELLOW
CREAT SERPL-MCNC: 1.6 MG/DL (ref 0.5–1.4)
DIFFERENTIAL METHOD: ABNORMAL
EOSINOPHIL # BLD AUTO: 0.6 K/UL (ref 0–0.5)
EOSINOPHIL NFR BLD: 8.5 % (ref 0–8)
ERYTHROCYTE [DISTWIDTH] IN BLOOD BY AUTOMATED COUNT: 14.7 % (ref 11.5–14.5)
EST. GFR  (AFRICAN AMERICAN): 32.2 ML/MIN/1.73 M^2
EST. GFR  (NON AFRICAN AMERICAN): 28 ML/MIN/1.73 M^2
GLUCOSE SERPL-MCNC: 102 MG/DL (ref 70–110)
GLUCOSE UR QL STRIP: NEGATIVE
HCT VFR BLD AUTO: 28.9 % (ref 37–48.5)
HGB BLD-MCNC: 9.5 G/DL (ref 12–16)
HGB UR QL STRIP: NEGATIVE
HYALINE CASTS #/AREA URNS LPF: 15 /LPF
IMM GRANULOCYTES # BLD AUTO: 0.06 K/UL (ref 0–0.04)
IMM GRANULOCYTES NFR BLD AUTO: 0.8 % (ref 0–0.5)
INR PPP: 1.6
KETONES UR QL STRIP: NEGATIVE
LEUKOCYTE ESTERASE UR QL STRIP: NEGATIVE
LYMPHOCYTES # BLD AUTO: 1.7 K/UL (ref 1–4.8)
LYMPHOCYTES NFR BLD: 22.3 % (ref 18–48)
MCH RBC QN AUTO: 33 PG (ref 27–31)
MCHC RBC AUTO-ENTMCNC: 32.9 G/DL (ref 32–36)
MCV RBC AUTO: 100 FL (ref 82–98)
MICROSCOPIC COMMENT: ABNORMAL
MONOCYTES # BLD AUTO: 0.7 K/UL (ref 0.3–1)
MONOCYTES NFR BLD: 9.2 % (ref 4–15)
NEUTROPHILS # BLD AUTO: 4.4 K/UL (ref 1.8–7.7)
NEUTROPHILS NFR BLD: 58.9 % (ref 38–73)
NITRITE UR QL STRIP: NEGATIVE
NRBC BLD-RTO: 0 /100 WBC
OB PNL STL: NEGATIVE
PH UR STRIP: 6 [PH] (ref 5–8)
PLATELET # BLD AUTO: 221 K/UL (ref 150–450)
PMV BLD AUTO: 10.6 FL (ref 9.2–12.9)
POTASSIUM SERPL-SCNC: 4.8 MMOL/L (ref 3.5–5.1)
PROT SERPL-MCNC: 6 G/DL (ref 6–8.4)
PROT UR QL STRIP: ABNORMAL
PROTHROMBIN TIME: 17.9 SEC (ref 11.8–14.3)
RBC # BLD AUTO: 2.88 M/UL (ref 4–5.4)
RBC #/AREA URNS HPF: 1 /HPF (ref 0–4)
SARS-COV-2 RDRP RESP QL NAA+PROBE: POSITIVE
SODIUM SERPL-SCNC: 134 MMOL/L (ref 136–145)
SP GR UR STRIP: 1.02 (ref 1–1.03)
SQUAMOUS #/AREA URNS HPF: 4 /HPF
URN SPEC COLLECT METH UR: ABNORMAL
UROBILINOGEN UR STRIP-ACNC: NEGATIVE EU/DL
WBC # BLD AUTO: 7.4 K/UL (ref 3.9–12.7)
WBC #/AREA URNS HPF: 1 /HPF (ref 0–5)

## 2021-09-21 PROCEDURE — 80053 COMPREHEN METABOLIC PANEL: CPT | Performed by: NURSE PRACTITIONER

## 2021-09-21 PROCEDURE — 93010 ELECTROCARDIOGRAM REPORT: CPT | Mod: ,,, | Performed by: INTERNAL MEDICINE

## 2021-09-21 PROCEDURE — 85025 COMPLETE CBC W/AUTO DIFF WBC: CPT | Performed by: NURSE PRACTITIONER

## 2021-09-21 PROCEDURE — 93005 ELECTROCARDIOGRAM TRACING: CPT | Performed by: INTERNAL MEDICINE

## 2021-09-21 PROCEDURE — C9113 INJ PANTOPRAZOLE SODIUM, VIA: HCPCS | Performed by: NURSE PRACTITIONER

## 2021-09-21 PROCEDURE — 86870 RBC ANTIBODY IDENTIFICATION: CPT | Performed by: NURSE PRACTITIONER

## 2021-09-21 PROCEDURE — 12000002 HC ACUTE/MED SURGE SEMI-PRIVATE ROOM

## 2021-09-21 PROCEDURE — 82272 OCCULT BLD FECES 1-3 TESTS: CPT | Performed by: EMERGENCY MEDICINE

## 2021-09-21 PROCEDURE — 93010 EKG 12-LEAD: ICD-10-PCS | Mod: ,,, | Performed by: INTERNAL MEDICINE

## 2021-09-21 PROCEDURE — 85610 PROTHROMBIN TIME: CPT | Performed by: NURSE PRACTITIONER

## 2021-09-21 PROCEDURE — 81001 URINALYSIS AUTO W/SCOPE: CPT | Performed by: NURSE PRACTITIONER

## 2021-09-21 PROCEDURE — 86900 BLOOD TYPING SEROLOGIC ABO: CPT | Performed by: NURSE PRACTITIONER

## 2021-09-21 PROCEDURE — 63600175 PHARM REV CODE 636 W HCPCS: Performed by: NURSE PRACTITIONER

## 2021-09-21 PROCEDURE — 25000003 PHARM REV CODE 250: Performed by: NURSE PRACTITIONER

## 2021-09-21 PROCEDURE — 21400001 HC TELEMETRY ROOM

## 2021-09-21 PROCEDURE — 99285 EMERGENCY DEPT VISIT HI MDM: CPT

## 2021-09-21 PROCEDURE — U0002 COVID-19 LAB TEST NON-CDC: HCPCS | Performed by: NURSE PRACTITIONER

## 2021-09-21 RX ORDER — MAGNESIUM SULFATE 1 G/100ML
1 INJECTION INTRAVENOUS
Status: DISCONTINUED | OUTPATIENT
Start: 2021-09-21 | End: 2021-09-24 | Stop reason: HOSPADM

## 2021-09-21 RX ORDER — LANOLIN ALCOHOL/MO/W.PET/CERES
400 CREAM (GRAM) TOPICAL DAILY
Status: DISCONTINUED | OUTPATIENT
Start: 2021-09-22 | End: 2021-09-24 | Stop reason: HOSPADM

## 2021-09-21 RX ORDER — ATORVASTATIN CALCIUM 20 MG/1
20 TABLET, FILM COATED ORAL DAILY
Status: DISCONTINUED | OUTPATIENT
Start: 2021-09-22 | End: 2021-09-24 | Stop reason: HOSPADM

## 2021-09-21 RX ORDER — POTASSIUM CHLORIDE 20 MEQ/1
20 TABLET, EXTENDED RELEASE ORAL
Status: DISCONTINUED | OUTPATIENT
Start: 2021-09-21 | End: 2021-09-24 | Stop reason: HOSPADM

## 2021-09-21 RX ORDER — PANTOPRAZOLE SODIUM 40 MG/10ML
40 INJECTION, POWDER, LYOPHILIZED, FOR SOLUTION INTRAVENOUS 2 TIMES DAILY
Status: DISCONTINUED | OUTPATIENT
Start: 2021-09-21 | End: 2021-09-24

## 2021-09-21 RX ORDER — POTASSIUM CHLORIDE 20 MEQ/1
40 TABLET, EXTENDED RELEASE ORAL
Status: DISCONTINUED | OUTPATIENT
Start: 2021-09-21 | End: 2021-09-24 | Stop reason: HOSPADM

## 2021-09-21 RX ORDER — NITROGLYCERIN 0.4 MG/1
0.4 TABLET SUBLINGUAL EVERY 5 MIN PRN
Status: DISCONTINUED | OUTPATIENT
Start: 2021-09-21 | End: 2021-09-24 | Stop reason: HOSPADM

## 2021-09-21 RX ORDER — CHOLECALCIFEROL (VITAMIN D3) 25 MCG
1000 TABLET ORAL DAILY
Status: DISCONTINUED | OUTPATIENT
Start: 2021-09-22 | End: 2021-09-24 | Stop reason: HOSPADM

## 2021-09-21 RX ORDER — SODIUM CHLORIDE 0.9 % (FLUSH) 0.9 %
10 SYRINGE (ML) INJECTION EVERY 12 HOURS PRN
Status: DISCONTINUED | OUTPATIENT
Start: 2021-09-21 | End: 2021-09-24 | Stop reason: HOSPADM

## 2021-09-21 RX ORDER — POLYETHYLENE GLYCOL 3350 17 G/17G
17 POWDER, FOR SOLUTION ORAL 2 TIMES DAILY
COMMUNITY
End: 2022-01-01

## 2021-09-21 RX ORDER — LANOLIN ALCOHOL/MO/W.PET/CERES
800 CREAM (GRAM) TOPICAL
Status: DISCONTINUED | OUTPATIENT
Start: 2021-09-21 | End: 2021-09-24 | Stop reason: HOSPADM

## 2021-09-21 RX ORDER — MAGNESIUM SULFATE HEPTAHYDRATE 40 MG/ML
2 INJECTION, SOLUTION INTRAVENOUS
Status: DISCONTINUED | OUTPATIENT
Start: 2021-09-21 | End: 2021-09-24 | Stop reason: HOSPADM

## 2021-09-21 RX ORDER — TAMSULOSIN HYDROCHLORIDE 0.4 MG/1
0.4 CAPSULE ORAL DAILY
Status: DISCONTINUED | OUTPATIENT
Start: 2021-09-22 | End: 2021-09-24 | Stop reason: HOSPADM

## 2021-09-21 RX ORDER — MAGNESIUM SULFATE HEPTAHYDRATE 40 MG/ML
4 INJECTION, SOLUTION INTRAVENOUS
Status: DISCONTINUED | OUTPATIENT
Start: 2021-09-21 | End: 2021-09-24 | Stop reason: HOSPADM

## 2021-09-21 RX ORDER — TALC
6 POWDER (GRAM) TOPICAL NIGHTLY
Status: DISCONTINUED | OUTPATIENT
Start: 2021-09-21 | End: 2021-09-24 | Stop reason: HOSPADM

## 2021-09-21 RX ORDER — ACETAMINOPHEN 325 MG/1
650 TABLET ORAL EVERY 4 HOURS PRN
Status: DISCONTINUED | OUTPATIENT
Start: 2021-09-21 | End: 2021-09-24 | Stop reason: HOSPADM

## 2021-09-21 RX ORDER — ASCORBIC ACID 500 MG
500 TABLET ORAL DAILY
Status: DISCONTINUED | OUTPATIENT
Start: 2021-09-22 | End: 2021-09-24 | Stop reason: HOSPADM

## 2021-09-21 RX ORDER — LEVOTHYROXINE SODIUM 88 UG/1
88 TABLET ORAL
Status: DISCONTINUED | OUTPATIENT
Start: 2021-09-22 | End: 2021-09-24 | Stop reason: HOSPADM

## 2021-09-21 RX ORDER — ONDANSETRON 2 MG/ML
4 INJECTION INTRAMUSCULAR; INTRAVENOUS EVERY 8 HOURS PRN
Status: DISCONTINUED | OUTPATIENT
Start: 2021-09-21 | End: 2021-09-24 | Stop reason: HOSPADM

## 2021-09-21 RX ORDER — POTASSIUM CHLORIDE 7.45 MG/ML
40 INJECTION INTRAVENOUS
Status: DISCONTINUED | OUTPATIENT
Start: 2021-09-21 | End: 2021-09-24 | Stop reason: HOSPADM

## 2021-09-21 RX ORDER — LANOLIN ALCOHOL/MO/W.PET/CERES
1 CREAM (GRAM) TOPICAL DAILY
Status: DISCONTINUED | OUTPATIENT
Start: 2021-09-22 | End: 2021-09-24 | Stop reason: HOSPADM

## 2021-09-21 RX ORDER — AMLODIPINE BESYLATE 5 MG/1
5 TABLET ORAL 2 TIMES DAILY
Status: DISCONTINUED | OUTPATIENT
Start: 2021-09-21 | End: 2021-09-24 | Stop reason: HOSPADM

## 2021-09-21 RX ORDER — ALBUTEROL SULFATE 90 UG/1
2 AEROSOL, METERED RESPIRATORY (INHALATION) EVERY 6 HOURS PRN
Status: DISCONTINUED | OUTPATIENT
Start: 2021-09-21 | End: 2021-09-24 | Stop reason: HOSPADM

## 2021-09-21 RX ORDER — AMOXICILLIN 250 MG
1 CAPSULE ORAL 2 TIMES DAILY
Status: DISCONTINUED | OUTPATIENT
Start: 2021-09-21 | End: 2021-09-24 | Stop reason: HOSPADM

## 2021-09-21 RX ORDER — FLUTICASONE PROPIONATE 50 MCG
2 SPRAY, SUSPENSION (ML) NASAL DAILY
Status: DISCONTINUED | OUTPATIENT
Start: 2021-09-22 | End: 2021-09-24 | Stop reason: HOSPADM

## 2021-09-21 RX ORDER — POTASSIUM CHLORIDE 7.45 MG/ML
20 INJECTION INTRAVENOUS
Status: DISCONTINUED | OUTPATIENT
Start: 2021-09-21 | End: 2021-09-24 | Stop reason: HOSPADM

## 2021-09-21 RX ORDER — POLYETHYLENE GLYCOL 3350 17 G/17G
17 POWDER, FOR SOLUTION ORAL 2 TIMES DAILY PRN
Status: DISCONTINUED | OUTPATIENT
Start: 2021-09-21 | End: 2021-09-24 | Stop reason: HOSPADM

## 2021-09-21 RX ORDER — SERTRALINE HYDROCHLORIDE 25 MG/1
25 TABLET, FILM COATED ORAL DAILY
Status: DISCONTINUED | OUTPATIENT
Start: 2021-09-22 | End: 2021-09-24 | Stop reason: HOSPADM

## 2021-09-21 RX ORDER — MECLIZINE HCL 12.5 MG 12.5 MG/1
25 TABLET ORAL 3 TIMES DAILY PRN
Status: DISCONTINUED | OUTPATIENT
Start: 2021-09-21 | End: 2021-09-24 | Stop reason: HOSPADM

## 2021-09-21 RX ORDER — FOLIC ACID-PYRIDOXINE-CYANOCOBALAMIN TAB 2.5-25-2 MG 2.5-25-2 MG
1 TAB ORAL DAILY
Status: ON HOLD | COMMUNITY
End: 2022-01-01 | Stop reason: HOSPADM

## 2021-09-21 RX ORDER — METHOCARBAMOL 500 MG/1
500 TABLET, FILM COATED ORAL 2 TIMES DAILY PRN
COMMUNITY
End: 2021-10-19

## 2021-09-21 RX ORDER — ALLOPURINOL 100 MG/1
100 TABLET ORAL DAILY
Status: DISCONTINUED | OUTPATIENT
Start: 2021-09-22 | End: 2021-09-24 | Stop reason: HOSPADM

## 2021-09-21 RX ORDER — CALCIUM CARBONATE 200(500)MG
500 TABLET,CHEWABLE ORAL DAILY
Status: DISCONTINUED | OUTPATIENT
Start: 2021-09-22 | End: 2021-09-24 | Stop reason: HOSPADM

## 2021-09-21 RX ORDER — FLUCONAZOLE 150 MG/1
150 TABLET ORAL ONCE
COMMUNITY
End: 2021-09-21

## 2021-09-21 RX ADMIN — MELATONIN 6 MG: at 10:09

## 2021-09-21 RX ADMIN — PANTOPRAZOLE SODIUM 40 MG: 40 INJECTION, POWDER, LYOPHILIZED, FOR SOLUTION INTRAVENOUS at 10:09

## 2021-09-21 RX ADMIN — AMLODIPINE BESYLATE 5 MG: 5 TABLET ORAL at 10:09

## 2021-09-22 ENCOUNTER — EXTERNAL HOME HEALTH (OUTPATIENT)
Dept: HOME HEALTH SERVICES | Facility: HOSPITAL | Age: 86
End: 2021-09-22
Payer: MEDICARE

## 2021-09-22 LAB
ALBUMIN SERPL BCP-MCNC: 2.7 G/DL (ref 3.5–5.2)
ALP SERPL-CCNC: 67 U/L (ref 55–135)
ALT SERPL W/O P-5'-P-CCNC: 19 U/L (ref 10–44)
ANION GAP SERPL CALC-SCNC: 9 MMOL/L (ref 8–16)
AST SERPL-CCNC: 26 U/L (ref 10–40)
BASOPHILS # BLD AUTO: 0.01 K/UL (ref 0–0.2)
BASOPHILS NFR BLD: 0.2 % (ref 0–1.9)
BILIRUB SERPL-MCNC: 0.6 MG/DL (ref 0.1–1)
BUN SERPL-MCNC: 32 MG/DL (ref 10–30)
CALCIUM SERPL-MCNC: 8.4 MG/DL (ref 8.7–10.5)
CHLORIDE SERPL-SCNC: 105 MMOL/L (ref 95–110)
CO2 SERPL-SCNC: 23 MMOL/L (ref 23–29)
CREAT SERPL-MCNC: 1.4 MG/DL (ref 0.5–1.4)
DIFFERENTIAL METHOD: ABNORMAL
EOSINOPHIL # BLD AUTO: 0.6 K/UL (ref 0–0.5)
EOSINOPHIL NFR BLD: 10.3 % (ref 0–8)
ERYTHROCYTE [DISTWIDTH] IN BLOOD BY AUTOMATED COUNT: 14.6 % (ref 11.5–14.5)
EST. GFR  (AFRICAN AMERICAN): 37.9 ML/MIN/1.73 M^2
EST. GFR  (NON AFRICAN AMERICAN): 32.9 ML/MIN/1.73 M^2
GLUCOSE SERPL-MCNC: 79 MG/DL (ref 70–110)
HCT VFR BLD AUTO: 29.1 % (ref 37–48.5)
HGB BLD-MCNC: 9.6 G/DL (ref 12–16)
IMM GRANULOCYTES # BLD AUTO: 0.05 K/UL (ref 0–0.04)
IMM GRANULOCYTES NFR BLD AUTO: 0.9 % (ref 0–0.5)
LYMPHOCYTES # BLD AUTO: 1.2 K/UL (ref 1–4.8)
LYMPHOCYTES NFR BLD: 22.4 % (ref 18–48)
MAGNESIUM SERPL-MCNC: 1.8 MG/DL (ref 1.6–2.6)
MCH RBC QN AUTO: 33.1 PG (ref 27–31)
MCHC RBC AUTO-ENTMCNC: 33 G/DL (ref 32–36)
MCV RBC AUTO: 100 FL (ref 82–98)
MONOCYTES # BLD AUTO: 0.5 K/UL (ref 0.3–1)
MONOCYTES NFR BLD: 9.7 % (ref 4–15)
NEUTROPHILS # BLD AUTO: 3.1 K/UL (ref 1.8–7.7)
NEUTROPHILS NFR BLD: 56.5 % (ref 38–73)
NRBC BLD-RTO: 0 /100 WBC
PLATELET # BLD AUTO: 233 K/UL (ref 150–450)
PMV BLD AUTO: 10.7 FL (ref 9.2–12.9)
POTASSIUM SERPL-SCNC: 4.6 MMOL/L (ref 3.5–5.1)
PROT SERPL-MCNC: 5.6 G/DL (ref 6–8.4)
RBC # BLD AUTO: 2.9 M/UL (ref 4–5.4)
SODIUM SERPL-SCNC: 137 MMOL/L (ref 136–145)
WBC # BLD AUTO: 5.54 K/UL (ref 3.9–12.7)

## 2021-09-22 PROCEDURE — C9399 UNCLASSIFIED DRUGS OR BIOLOG: HCPCS | Performed by: NURSE PRACTITIONER

## 2021-09-22 PROCEDURE — 94761 N-INVAS EAR/PLS OXIMETRY MLT: CPT

## 2021-09-22 PROCEDURE — 25000242 PHARM REV CODE 250 ALT 637 W/ HCPCS: Performed by: FAMILY MEDICINE

## 2021-09-22 PROCEDURE — 25000003 PHARM REV CODE 250: Performed by: NURSE PRACTITIONER

## 2021-09-22 PROCEDURE — 99900035 HC TECH TIME PER 15 MIN (STAT)

## 2021-09-22 PROCEDURE — 25000242 PHARM REV CODE 250 ALT 637 W/ HCPCS: Performed by: NURSE PRACTITIONER

## 2021-09-22 PROCEDURE — 94799 UNLISTED PULMONARY SVC/PX: CPT

## 2021-09-22 PROCEDURE — 83735 ASSAY OF MAGNESIUM: CPT | Performed by: NURSE PRACTITIONER

## 2021-09-22 PROCEDURE — 36415 COLL VENOUS BLD VENIPUNCTURE: CPT | Performed by: NURSE PRACTITIONER

## 2021-09-22 PROCEDURE — 85025 COMPLETE CBC W/AUTO DIFF WBC: CPT | Performed by: NURSE PRACTITIONER

## 2021-09-22 PROCEDURE — 12000002 HC ACUTE/MED SURGE SEMI-PRIVATE ROOM

## 2021-09-22 PROCEDURE — C9113 INJ PANTOPRAZOLE SODIUM, VIA: HCPCS | Performed by: NURSE PRACTITIONER

## 2021-09-22 PROCEDURE — 94640 AIRWAY INHALATION TREATMENT: CPT

## 2021-09-22 PROCEDURE — 63600175 PHARM REV CODE 636 W HCPCS: Performed by: NURSE PRACTITIONER

## 2021-09-22 PROCEDURE — 99900031 HC PATIENT EDUCATION (STAT)

## 2021-09-22 PROCEDURE — 80053 COMPREHEN METABOLIC PANEL: CPT | Performed by: NURSE PRACTITIONER

## 2021-09-22 RX ORDER — FLUTICASONE FUROATE AND VILANTEROL 100; 25 UG/1; UG/1
1 POWDER RESPIRATORY (INHALATION) DAILY
Status: DISCONTINUED | OUTPATIENT
Start: 2021-09-22 | End: 2021-09-24 | Stop reason: HOSPADM

## 2021-09-22 RX ADMIN — THERA TABS 1 TABLET: TAB at 08:09

## 2021-09-22 RX ADMIN — TAMSULOSIN HYDROCHLORIDE 0.4 MG: 0.4 CAPSULE ORAL at 08:09

## 2021-09-22 RX ADMIN — FERROUS SULFATE TAB 325 MG (65 MG ELEMENTAL FE) 1 EACH: 325 (65 FE) TAB at 09:09

## 2021-09-22 RX ADMIN — Medication 1000 UNITS: at 09:09

## 2021-09-22 RX ADMIN — SERTRALINE HYDROCHLORIDE 25 MG: 25 TABLET ORAL at 09:09

## 2021-09-22 RX ADMIN — ATORVASTATIN CALCIUM 20 MG: 20 TABLET, FILM COATED ORAL at 08:09

## 2021-09-22 RX ADMIN — AMLODIPINE BESYLATE 5 MG: 5 TABLET ORAL at 08:09

## 2021-09-22 RX ADMIN — PANTOPRAZOLE SODIUM 40 MG: 40 INJECTION, POWDER, LYOPHILIZED, FOR SOLUTION INTRAVENOUS at 08:09

## 2021-09-22 RX ADMIN — FLUTICASONE FUROATE AND VILANTEROL TRIFENATATE 1 PUFF: 100; 25 POWDER RESPIRATORY (INHALATION) at 08:09

## 2021-09-22 RX ADMIN — TIOTROPIUM BROMIDE INHALATION SPRAY 2 PUFF: 3.12 SPRAY, METERED RESPIRATORY (INHALATION) at 08:09

## 2021-09-22 RX ADMIN — MELATONIN 6 MG: at 08:09

## 2021-09-22 RX ADMIN — FLUTICASONE PROPIONATE 100 MCG: 50 SPRAY, METERED NASAL at 08:09

## 2021-09-22 RX ADMIN — REMDESIVIR 200 MG: 100 INJECTION, POWDER, LYOPHILIZED, FOR SOLUTION INTRAVENOUS at 08:09

## 2021-09-22 RX ADMIN — Medication 500 MG: at 09:09

## 2021-09-22 RX ADMIN — SENNOSIDES AND DOCUSATE SODIUM 1 TABLET: 8.6; 5 TABLET ORAL at 09:09

## 2021-09-22 RX ADMIN — ALLOPURINOL 100 MG: 100 TABLET ORAL at 09:09

## 2021-09-22 RX ADMIN — LEVOTHYROXINE SODIUM 88 MCG: 88 TABLET ORAL at 05:09

## 2021-09-22 RX ADMIN — CALCIUM CARBONATE (ANTACID) CHEW TAB 500 MG 500 MG: 500 CHEW TAB at 09:09

## 2021-09-22 RX ADMIN — MAGNESIUM OXIDE 400 MG: 400 TABLET ORAL at 08:09

## 2021-09-23 LAB
ALBUMIN SERPL BCP-MCNC: 2.6 G/DL (ref 3.5–5.2)
ALP SERPL-CCNC: 73 U/L (ref 55–135)
ALT SERPL W/O P-5'-P-CCNC: 18 U/L (ref 10–44)
ANION GAP SERPL CALC-SCNC: 4 MMOL/L (ref 8–16)
AST SERPL-CCNC: 26 U/L (ref 10–40)
BASOPHILS # BLD AUTO: 0.02 K/UL (ref 0–0.2)
BASOPHILS NFR BLD: 0.3 % (ref 0–1.9)
BILIRUB SERPL-MCNC: 0.3 MG/DL (ref 0.1–1)
BUN SERPL-MCNC: 39 MG/DL (ref 10–30)
CALCIUM SERPL-MCNC: 8.7 MG/DL (ref 8.7–10.5)
CHLORIDE SERPL-SCNC: 110 MMOL/L (ref 95–110)
CO2 SERPL-SCNC: 26 MMOL/L (ref 23–29)
CREAT SERPL-MCNC: 1.4 MG/DL (ref 0.5–1.4)
DIFFERENTIAL METHOD: ABNORMAL
EOSINOPHIL # BLD AUTO: 0.3 K/UL (ref 0–0.5)
EOSINOPHIL NFR BLD: 5.4 % (ref 0–8)
ERYTHROCYTE [DISTWIDTH] IN BLOOD BY AUTOMATED COUNT: 14.1 % (ref 11.5–14.5)
EST. GFR  (AFRICAN AMERICAN): 37.9 ML/MIN/1.73 M^2
EST. GFR  (NON AFRICAN AMERICAN): 32.9 ML/MIN/1.73 M^2
GLUCOSE SERPL-MCNC: 93 MG/DL (ref 70–110)
HCT VFR BLD AUTO: 28 % (ref 37–48.5)
HGB BLD-MCNC: 9.2 G/DL (ref 12–16)
IMM GRANULOCYTES # BLD AUTO: 0.04 K/UL (ref 0–0.04)
IMM GRANULOCYTES NFR BLD AUTO: 0.6 % (ref 0–0.5)
LYMPHOCYTES # BLD AUTO: 1.4 K/UL (ref 1–4.8)
LYMPHOCYTES NFR BLD: 21.4 % (ref 18–48)
MAGNESIUM SERPL-MCNC: 1.8 MG/DL (ref 1.6–2.6)
MCH RBC QN AUTO: 32.9 PG (ref 27–31)
MCHC RBC AUTO-ENTMCNC: 32.9 G/DL (ref 32–36)
MCV RBC AUTO: 100 FL (ref 82–98)
MONOCYTES # BLD AUTO: 0.5 K/UL (ref 0.3–1)
MONOCYTES NFR BLD: 8.1 % (ref 4–15)
NEUTROPHILS # BLD AUTO: 4.1 K/UL (ref 1.8–7.7)
NEUTROPHILS NFR BLD: 64.2 % (ref 38–73)
NRBC BLD-RTO: 0 /100 WBC
PLATELET # BLD AUTO: 215 K/UL (ref 150–450)
PMV BLD AUTO: 10.5 FL (ref 9.2–12.9)
POTASSIUM SERPL-SCNC: 5 MMOL/L (ref 3.5–5.1)
PROT SERPL-MCNC: 5.3 G/DL (ref 6–8.4)
RBC # BLD AUTO: 2.8 M/UL (ref 4–5.4)
SODIUM SERPL-SCNC: 140 MMOL/L (ref 136–145)
WBC # BLD AUTO: 6.32 K/UL (ref 3.9–12.7)

## 2021-09-23 PROCEDURE — 94799 UNLISTED PULMONARY SVC/PX: CPT

## 2021-09-23 PROCEDURE — 94761 N-INVAS EAR/PLS OXIMETRY MLT: CPT

## 2021-09-23 PROCEDURE — 94640 AIRWAY INHALATION TREATMENT: CPT

## 2021-09-23 PROCEDURE — 12000002 HC ACUTE/MED SURGE SEMI-PRIVATE ROOM

## 2021-09-23 PROCEDURE — 25000003 PHARM REV CODE 250: Performed by: INTERNAL MEDICINE

## 2021-09-23 PROCEDURE — 85025 COMPLETE CBC W/AUTO DIFF WBC: CPT | Performed by: NURSE PRACTITIONER

## 2021-09-23 PROCEDURE — C9399 UNCLASSIFIED DRUGS OR BIOLOG: HCPCS | Performed by: NURSE PRACTITIONER

## 2021-09-23 PROCEDURE — 63600175 PHARM REV CODE 636 W HCPCS: Performed by: NURSE PRACTITIONER

## 2021-09-23 PROCEDURE — C9113 INJ PANTOPRAZOLE SODIUM, VIA: HCPCS | Performed by: NURSE PRACTITIONER

## 2021-09-23 PROCEDURE — 99900031 HC PATIENT EDUCATION (STAT)

## 2021-09-23 PROCEDURE — 99900035 HC TECH TIME PER 15 MIN (STAT)

## 2021-09-23 PROCEDURE — 25000003 PHARM REV CODE 250: Performed by: NURSE PRACTITIONER

## 2021-09-23 PROCEDURE — 80053 COMPREHEN METABOLIC PANEL: CPT | Performed by: NURSE PRACTITIONER

## 2021-09-23 PROCEDURE — 36415 COLL VENOUS BLD VENIPUNCTURE: CPT | Performed by: NURSE PRACTITIONER

## 2021-09-23 PROCEDURE — 83735 ASSAY OF MAGNESIUM: CPT | Performed by: NURSE PRACTITIONER

## 2021-09-23 RX ADMIN — FERROUS SULFATE TAB 325 MG (65 MG ELEMENTAL FE) 1 EACH: 325 (65 FE) TAB at 08:09

## 2021-09-23 RX ADMIN — FLUTICASONE FUROATE AND VILANTEROL TRIFENATATE 1 PUFF: 100; 25 POWDER RESPIRATORY (INHALATION) at 08:09

## 2021-09-23 RX ADMIN — Medication 500 MG: at 08:09

## 2021-09-23 RX ADMIN — SENNOSIDES AND DOCUSATE SODIUM 1 TABLET: 8.6; 5 TABLET ORAL at 09:09

## 2021-09-23 RX ADMIN — TAMSULOSIN HYDROCHLORIDE 0.4 MG: 0.4 CAPSULE ORAL at 08:09

## 2021-09-23 RX ADMIN — Medication 1 TABLET: at 08:09

## 2021-09-23 RX ADMIN — RIVAROXABAN 10 MG: 10 TABLET, FILM COATED ORAL at 05:09

## 2021-09-23 RX ADMIN — ALLOPURINOL 100 MG: 100 TABLET ORAL at 08:09

## 2021-09-23 RX ADMIN — PANTOPRAZOLE SODIUM 40 MG: 40 INJECTION, POWDER, LYOPHILIZED, FOR SOLUTION INTRAVENOUS at 09:09

## 2021-09-23 RX ADMIN — AMLODIPINE BESYLATE 5 MG: 5 TABLET ORAL at 09:09

## 2021-09-23 RX ADMIN — SENNOSIDES AND DOCUSATE SODIUM 1 TABLET: 8.6; 5 TABLET ORAL at 08:09

## 2021-09-23 RX ADMIN — CALCIUM CARBONATE (ANTACID) CHEW TAB 500 MG 500 MG: 500 CHEW TAB at 08:09

## 2021-09-23 RX ADMIN — TIOTROPIUM BROMIDE INHALATION SPRAY 2 PUFF: 3.12 SPRAY, METERED RESPIRATORY (INHALATION) at 08:09

## 2021-09-23 RX ADMIN — MAGNESIUM OXIDE 400 MG: 400 TABLET ORAL at 08:09

## 2021-09-23 RX ADMIN — MELATONIN 6 MG: at 09:09

## 2021-09-23 RX ADMIN — FLUTICASONE PROPIONATE 100 MCG: 50 SPRAY, METERED NASAL at 08:09

## 2021-09-23 RX ADMIN — PANTOPRAZOLE SODIUM 40 MG: 40 INJECTION, POWDER, LYOPHILIZED, FOR SOLUTION INTRAVENOUS at 08:09

## 2021-09-23 RX ADMIN — REMDESIVIR 100 MG: 100 INJECTION, POWDER, LYOPHILIZED, FOR SOLUTION INTRAVENOUS at 08:09

## 2021-09-23 RX ADMIN — AMLODIPINE BESYLATE 5 MG: 5 TABLET ORAL at 08:09

## 2021-09-23 RX ADMIN — LEVOTHYROXINE SODIUM 88 MCG: 88 TABLET ORAL at 05:09

## 2021-09-23 RX ADMIN — SERTRALINE HYDROCHLORIDE 25 MG: 25 TABLET ORAL at 08:09

## 2021-09-23 RX ADMIN — THERA TABS 1 TABLET: TAB at 08:09

## 2021-09-23 RX ADMIN — ATORVASTATIN CALCIUM 20 MG: 20 TABLET, FILM COATED ORAL at 08:09

## 2021-09-23 RX ADMIN — Medication 1000 UNITS: at 08:09

## 2021-09-24 ENCOUNTER — TELEPHONE (OUTPATIENT)
Dept: FAMILY MEDICINE | Facility: CLINIC | Age: 86
End: 2021-09-24

## 2021-09-24 VITALS
SYSTOLIC BLOOD PRESSURE: 133 MMHG | HEART RATE: 57 BPM | RESPIRATION RATE: 19 BRPM | DIASTOLIC BLOOD PRESSURE: 60 MMHG | BODY MASS INDEX: 22.5 KG/M2 | WEIGHT: 127 LBS | OXYGEN SATURATION: 96 % | TEMPERATURE: 98 F | HEIGHT: 63 IN

## 2021-09-24 LAB
ALBUMIN SERPL BCP-MCNC: 2.6 G/DL (ref 3.5–5.2)
ALP SERPL-CCNC: 66 U/L (ref 55–135)
ALT SERPL W/O P-5'-P-CCNC: 15 U/L (ref 10–44)
ANION GAP SERPL CALC-SCNC: 3 MMOL/L (ref 8–16)
AST SERPL-CCNC: 22 U/L (ref 10–40)
BASOPHILS # BLD AUTO: 0.02 K/UL (ref 0–0.2)
BASOPHILS NFR BLD: 0.4 % (ref 0–1.9)
BILIRUB SERPL-MCNC: 0.5 MG/DL (ref 0.1–1)
BLOOD GROUP ANTIBODIES SERPL: NORMAL
BUN SERPL-MCNC: 40 MG/DL (ref 10–30)
CALCIUM SERPL-MCNC: 8.5 MG/DL (ref 8.7–10.5)
CHLORIDE SERPL-SCNC: 112 MMOL/L (ref 95–110)
CO2 SERPL-SCNC: 26 MMOL/L (ref 23–29)
CREAT SERPL-MCNC: 1.3 MG/DL (ref 0.5–1.4)
DIFFERENTIAL METHOD: ABNORMAL
EOSINOPHIL # BLD AUTO: 0.3 K/UL (ref 0–0.5)
EOSINOPHIL NFR BLD: 6.2 % (ref 0–8)
ERYTHROCYTE [DISTWIDTH] IN BLOOD BY AUTOMATED COUNT: 14.4 % (ref 11.5–14.5)
EST. GFR  (AFRICAN AMERICAN): 41.4 ML/MIN/1.73 M^2
EST. GFR  (NON AFRICAN AMERICAN): 36 ML/MIN/1.73 M^2
GLUCOSE SERPL-MCNC: 86 MG/DL (ref 70–110)
HCT VFR BLD AUTO: 28.3 % (ref 37–48.5)
HGB BLD-MCNC: 9.4 G/DL (ref 12–16)
IMM GRANULOCYTES # BLD AUTO: 0.02 K/UL (ref 0–0.04)
IMM GRANULOCYTES NFR BLD AUTO: 0.4 % (ref 0–0.5)
LYMPHOCYTES # BLD AUTO: 1.5 K/UL (ref 1–4.8)
LYMPHOCYTES NFR BLD: 33.4 % (ref 18–48)
MAGNESIUM SERPL-MCNC: 1.9 MG/DL (ref 1.6–2.6)
MCH RBC QN AUTO: 32.5 PG (ref 27–31)
MCHC RBC AUTO-ENTMCNC: 33.2 G/DL (ref 32–36)
MCV RBC AUTO: 98 FL (ref 82–98)
MONOCYTES # BLD AUTO: 0.5 K/UL (ref 0.3–1)
MONOCYTES NFR BLD: 10.5 % (ref 4–15)
NEUTROPHILS # BLD AUTO: 2.2 K/UL (ref 1.8–7.7)
NEUTROPHILS NFR BLD: 49.1 % (ref 38–73)
NRBC BLD-RTO: 0 /100 WBC
PLATELET # BLD AUTO: 209 K/UL (ref 150–450)
PMV BLD AUTO: 10.2 FL (ref 9.2–12.9)
POTASSIUM SERPL-SCNC: 4.8 MMOL/L (ref 3.5–5.1)
PROT SERPL-MCNC: 5.2 G/DL (ref 6–8.4)
RBC # BLD AUTO: 2.89 M/UL (ref 4–5.4)
SODIUM SERPL-SCNC: 141 MMOL/L (ref 136–145)
WBC # BLD AUTO: 4.55 K/UL (ref 3.9–12.7)

## 2021-09-24 PROCEDURE — 80053 COMPREHEN METABOLIC PANEL: CPT | Performed by: NURSE PRACTITIONER

## 2021-09-24 PROCEDURE — 63600175 PHARM REV CODE 636 W HCPCS: Performed by: NURSE PRACTITIONER

## 2021-09-24 PROCEDURE — C9399 UNCLASSIFIED DRUGS OR BIOLOG: HCPCS | Performed by: NURSE PRACTITIONER

## 2021-09-24 PROCEDURE — 36415 COLL VENOUS BLD VENIPUNCTURE: CPT | Performed by: NURSE PRACTITIONER

## 2021-09-24 PROCEDURE — 25000003 PHARM REV CODE 250: Performed by: NURSE PRACTITIONER

## 2021-09-24 PROCEDURE — 83735 ASSAY OF MAGNESIUM: CPT | Performed by: NURSE PRACTITIONER

## 2021-09-24 PROCEDURE — 25000003 PHARM REV CODE 250: Performed by: INTERNAL MEDICINE

## 2021-09-24 PROCEDURE — 85025 COMPLETE CBC W/AUTO DIFF WBC: CPT | Performed by: NURSE PRACTITIONER

## 2021-09-24 PROCEDURE — 94640 AIRWAY INHALATION TREATMENT: CPT

## 2021-09-24 RX ORDER — PANTOPRAZOLE SODIUM 40 MG/1
40 TABLET, DELAYED RELEASE ORAL
Status: DISCONTINUED | OUTPATIENT
Start: 2021-09-24 | End: 2021-09-24 | Stop reason: HOSPADM

## 2021-09-24 RX ADMIN — TAMSULOSIN HYDROCHLORIDE 0.4 MG: 0.4 CAPSULE ORAL at 09:09

## 2021-09-24 RX ADMIN — Medication 1000 UNITS: at 09:09

## 2021-09-24 RX ADMIN — MAGNESIUM OXIDE 400 MG: 400 TABLET ORAL at 09:09

## 2021-09-24 RX ADMIN — PANTOPRAZOLE SODIUM 40 MG: 40 TABLET, DELAYED RELEASE ORAL at 06:09

## 2021-09-24 RX ADMIN — AMLODIPINE BESYLATE 5 MG: 5 TABLET ORAL at 09:09

## 2021-09-24 RX ADMIN — FLUTICASONE PROPIONATE 100 MCG: 50 SPRAY, METERED NASAL at 09:09

## 2021-09-24 RX ADMIN — FERROUS SULFATE TAB 325 MG (65 MG ELEMENTAL FE) 1 EACH: 325 (65 FE) TAB at 09:09

## 2021-09-24 RX ADMIN — Medication 1 TABLET: at 09:09

## 2021-09-24 RX ADMIN — SERTRALINE HYDROCHLORIDE 25 MG: 25 TABLET ORAL at 09:09

## 2021-09-24 RX ADMIN — LEVOTHYROXINE SODIUM 88 MCG: 88 TABLET ORAL at 06:09

## 2021-09-24 RX ADMIN — CALCIUM CARBONATE (ANTACID) CHEW TAB 500 MG 500 MG: 500 CHEW TAB at 09:09

## 2021-09-24 RX ADMIN — FLUTICASONE FUROATE AND VILANTEROL TRIFENATATE 1 PUFF: 100; 25 POWDER RESPIRATORY (INHALATION) at 08:09

## 2021-09-24 RX ADMIN — REMDESIVIR 100 MG: 100 INJECTION, POWDER, LYOPHILIZED, FOR SOLUTION INTRAVENOUS at 09:09

## 2021-09-24 RX ADMIN — SENNOSIDES AND DOCUSATE SODIUM 1 TABLET: 8.6; 5 TABLET ORAL at 09:09

## 2021-09-24 RX ADMIN — THERA TABS 1 TABLET: TAB at 09:09

## 2021-09-24 RX ADMIN — ALLOPURINOL 100 MG: 100 TABLET ORAL at 09:09

## 2021-09-24 RX ADMIN — Medication 500 MG: at 09:09

## 2021-09-24 RX ADMIN — ATORVASTATIN CALCIUM 20 MG: 20 TABLET, FILM COATED ORAL at 09:09

## 2021-09-24 RX ADMIN — TIOTROPIUM BROMIDE INHALATION SPRAY 2 PUFF: 3.12 SPRAY, METERED RESPIRATORY (INHALATION) at 08:09

## 2021-09-27 ENCOUNTER — PATIENT MESSAGE (OUTPATIENT)
Dept: FAMILY MEDICINE | Facility: CLINIC | Age: 86
End: 2021-09-27

## 2021-09-27 ENCOUNTER — PATIENT OUTREACH (OUTPATIENT)
Dept: ADMINISTRATIVE | Facility: OTHER | Age: 86
End: 2021-09-27

## 2021-09-27 ENCOUNTER — TELEPHONE (OUTPATIENT)
Dept: FAMILY MEDICINE | Facility: CLINIC | Age: 86
End: 2021-09-27

## 2021-09-28 ENCOUNTER — TELEPHONE (OUTPATIENT)
Dept: FAMILY MEDICINE | Facility: CLINIC | Age: 86
End: 2021-09-28

## 2021-09-28 ENCOUNTER — OFFICE VISIT (OUTPATIENT)
Dept: FAMILY MEDICINE | Facility: CLINIC | Age: 86
End: 2021-09-28
Payer: MEDICARE

## 2021-09-28 ENCOUNTER — PATIENT OUTREACH (OUTPATIENT)
Dept: INFECTIOUS DISEASES | Facility: HOSPITAL | Age: 86
End: 2021-09-28

## 2021-09-28 DIAGNOSIS — U07.1 COVID-19 VIRUS INFECTION: Primary | ICD-10-CM

## 2021-09-28 PROCEDURE — 99442 PR PHYSICIAN TELEPHONE EVALUATION 11-20 MIN: CPT | Mod: 95,,, | Performed by: FAMILY MEDICINE

## 2021-09-28 PROCEDURE — 1157F PR ADVANCE CARE PLAN OR EQUIV PRESENT IN MEDICAL RECORD: ICD-10-PCS | Mod: CPTII,95,, | Performed by: FAMILY MEDICINE

## 2021-09-28 PROCEDURE — 1157F ADVNC CARE PLAN IN RCRD: CPT | Mod: CPTII,95,, | Performed by: FAMILY MEDICINE

## 2021-09-28 PROCEDURE — 99442 PR PHYSICIAN TELEPHONE EVALUATION 11-20 MIN: ICD-10-PCS | Mod: 95,,, | Performed by: FAMILY MEDICINE

## 2021-09-29 ENCOUNTER — DOCUMENT SCAN (OUTPATIENT)
Dept: HOME HEALTH SERVICES | Facility: HOSPITAL | Age: 86
End: 2021-09-29
Payer: MEDICARE

## 2021-10-01 ENCOUNTER — DOCUMENT SCAN (OUTPATIENT)
Dept: HOME HEALTH SERVICES | Facility: HOSPITAL | Age: 86
End: 2021-10-01
Payer: MEDICARE

## 2021-10-05 ENCOUNTER — LAB VISIT (OUTPATIENT)
Dept: LAB | Facility: HOSPITAL | Age: 86
End: 2021-10-05
Attending: INTERNAL MEDICINE
Payer: MEDICARE

## 2021-10-05 DIAGNOSIS — R80.9 PROTEINURIA: ICD-10-CM

## 2021-10-05 DIAGNOSIS — D64.9 ANEMIA, UNSPECIFIED: ICD-10-CM

## 2021-10-05 DIAGNOSIS — N18.30 CHRONIC KIDNEY DISEASE, STAGE III (MODERATE): Primary | ICD-10-CM

## 2021-10-05 LAB
ALBUMIN SERPL BCP-MCNC: 2.8 G/DL (ref 3.5–5.2)
ANION GAP SERPL CALC-SCNC: 10 MMOL/L (ref 8–16)
BACTERIA #/AREA URNS HPF: ABNORMAL /HPF
BASOPHILS # BLD AUTO: 0.03 K/UL (ref 0–0.2)
BASOPHILS NFR BLD: 0.4 % (ref 0–1.9)
BILIRUB UR QL STRIP: NEGATIVE
BUN SERPL-MCNC: 43 MG/DL (ref 10–30)
CALCIUM SERPL-MCNC: 8.9 MG/DL (ref 8.7–10.5)
CHLORIDE SERPL-SCNC: 113 MMOL/L (ref 95–110)
CLARITY UR: CLEAR
CO2 SERPL-SCNC: 20 MMOL/L (ref 23–29)
COLOR UR: YELLOW
CREAT SERPL-MCNC: 1.3 MG/DL (ref 0.5–1.4)
CREAT UR-MCNC: 78.9 MG/DL (ref 15–325)
DIFFERENTIAL METHOD: ABNORMAL
EOSINOPHIL # BLD AUTO: 0.7 K/UL (ref 0–0.5)
EOSINOPHIL NFR BLD: 9.3 % (ref 0–8)
ERYTHROCYTE [DISTWIDTH] IN BLOOD BY AUTOMATED COUNT: 13.9 % (ref 11.5–14.5)
EST. GFR  (AFRICAN AMERICAN): 41 ML/MIN/1.73 M^2
EST. GFR  (NON AFRICAN AMERICAN): 36 ML/MIN/1.73 M^2
FERRITIN SERPL-MCNC: 627 NG/ML (ref 20–300)
GLUCOSE SERPL-MCNC: 101 MG/DL (ref 70–110)
GLUCOSE UR QL STRIP: NEGATIVE
HCT VFR BLD AUTO: 28.7 % (ref 37–48.5)
HGB BLD-MCNC: 9.2 G/DL (ref 12–16)
HGB UR QL STRIP: ABNORMAL
HYALINE CASTS #/AREA URNS LPF: 0 /LPF
IMM GRANULOCYTES # BLD AUTO: 0.04 K/UL (ref 0–0.04)
IMM GRANULOCYTES NFR BLD AUTO: 0.6 % (ref 0–0.5)
IRON SERPL-MCNC: 52 UG/DL (ref 30–160)
KETONES UR QL STRIP: NEGATIVE
LEUKOCYTE ESTERASE UR QL STRIP: ABNORMAL
LYMPHOCYTES # BLD AUTO: 1 K/UL (ref 1–4.8)
LYMPHOCYTES NFR BLD: 14.2 % (ref 18–48)
MCH RBC QN AUTO: 32.9 PG (ref 27–31)
MCHC RBC AUTO-ENTMCNC: 32.1 G/DL (ref 32–36)
MCV RBC AUTO: 103 FL (ref 82–98)
MICROSCOPIC COMMENT: ABNORMAL
MONOCYTES # BLD AUTO: 0.6 K/UL (ref 0.3–1)
MONOCYTES NFR BLD: 8.8 % (ref 4–15)
NEUTROPHILS # BLD AUTO: 4.8 K/UL (ref 1.8–7.7)
NEUTROPHILS NFR BLD: 66.7 % (ref 38–73)
NITRITE UR QL STRIP: NEGATIVE
NRBC BLD-RTO: 0 /100 WBC
PH UR STRIP: 6 [PH] (ref 5–8)
PHOSPHATE SERPL-MCNC: 3.8 MG/DL (ref 2.7–4.5)
PLATELET # BLD AUTO: 187 K/UL (ref 150–450)
PMV BLD AUTO: 11.4 FL (ref 9.2–12.9)
POTASSIUM SERPL-SCNC: 4.1 MMOL/L (ref 3.5–5.1)
PROT UR QL STRIP: ABNORMAL
PROT UR-MCNC: 245 MG/DL (ref 0–15)
PROT/CREAT UR: 3.11 MG/G{CREAT} (ref 0–0.2)
RBC # BLD AUTO: 2.8 M/UL (ref 4–5.4)
RBC #/AREA URNS HPF: 15 /HPF (ref 0–4)
SATURATED IRON: 26 % (ref 20–50)
SODIUM SERPL-SCNC: 143 MMOL/L (ref 136–145)
SP GR UR STRIP: 1.02 (ref 1–1.03)
TOTAL IRON BINDING CAPACITY: 201 UG/DL (ref 250–450)
TRANSFERRIN SERPL-MCNC: 136 MG/DL (ref 200–375)
URATE SERPL-MCNC: 4 MG/DL (ref 2.4–5.7)
URN SPEC COLLECT METH UR: ABNORMAL
UROBILINOGEN UR STRIP-ACNC: NEGATIVE EU/DL
WBC # BLD AUTO: 7.17 K/UL (ref 3.9–12.7)
WBC #/AREA URNS HPF: 14 /HPF (ref 0–5)

## 2021-10-05 PROCEDURE — 84156 ASSAY OF PROTEIN URINE: CPT | Performed by: INTERNAL MEDICINE

## 2021-10-05 PROCEDURE — 84466 ASSAY OF TRANSFERRIN: CPT | Performed by: INTERNAL MEDICINE

## 2021-10-05 PROCEDURE — 84550 ASSAY OF BLOOD/URIC ACID: CPT | Performed by: INTERNAL MEDICINE

## 2021-10-05 PROCEDURE — 80069 RENAL FUNCTION PANEL: CPT | Performed by: INTERNAL MEDICINE

## 2021-10-05 PROCEDURE — 82728 ASSAY OF FERRITIN: CPT | Performed by: INTERNAL MEDICINE

## 2021-10-05 PROCEDURE — 81000 URINALYSIS NONAUTO W/SCOPE: CPT | Performed by: INTERNAL MEDICINE

## 2021-10-05 PROCEDURE — 85025 COMPLETE CBC W/AUTO DIFF WBC: CPT | Mod: 91 | Performed by: INTERNAL MEDICINE

## 2021-10-06 ENCOUNTER — DOCUMENT SCAN (OUTPATIENT)
Dept: HOME HEALTH SERVICES | Facility: HOSPITAL | Age: 86
End: 2021-10-06
Payer: MEDICARE

## 2021-10-08 ENCOUNTER — OFFICE VISIT (OUTPATIENT)
Dept: ORTHOPEDICS | Facility: CLINIC | Age: 86
End: 2021-10-08
Payer: MEDICARE

## 2021-10-08 ENCOUNTER — HOSPITAL ENCOUNTER (OUTPATIENT)
Dept: RADIOLOGY | Facility: HOSPITAL | Age: 86
Discharge: HOME | End: 2021-10-08
Attending: ORTHOPAEDIC SURGERY
Payer: MEDICARE

## 2021-10-08 VITALS — RESPIRATION RATE: 17 BRPM | HEIGHT: 63 IN | BODY MASS INDEX: 22.5 KG/M2 | WEIGHT: 127 LBS

## 2021-10-08 DIAGNOSIS — M25.551 RIGHT HIP PAIN: ICD-10-CM

## 2021-10-08 DIAGNOSIS — M25.551 RIGHT HIP PAIN: Primary | ICD-10-CM

## 2021-10-08 PROCEDURE — 1111F DSCHRG MED/CURRENT MED MERGE: CPT | Mod: CPTII,S$GLB,, | Performed by: ORTHOPAEDIC SURGERY

## 2021-10-08 PROCEDURE — 99213 OFFICE O/P EST LOW 20 MIN: CPT | Mod: S$GLB,,, | Performed by: ORTHOPAEDIC SURGERY

## 2021-10-08 PROCEDURE — 3288F FALL RISK ASSESSMENT DOCD: CPT | Mod: CPTII,S$GLB,, | Performed by: ORTHOPAEDIC SURGERY

## 2021-10-08 PROCEDURE — 99213 PR OFFICE/OUTPT VISIT, EST, LEVL III, 20-29 MIN: ICD-10-PCS | Mod: S$GLB,,, | Performed by: ORTHOPAEDIC SURGERY

## 2021-10-08 PROCEDURE — 1159F MED LIST DOCD IN RCRD: CPT | Mod: CPTII,S$GLB,, | Performed by: ORTHOPAEDIC SURGERY

## 2021-10-08 PROCEDURE — 1159F PR MEDICATION LIST DOCUMENTED IN MEDICAL RECORD: ICD-10-PCS | Mod: CPTII,S$GLB,, | Performed by: ORTHOPAEDIC SURGERY

## 2021-10-08 PROCEDURE — 1101F PR PT FALLS ASSESS DOC 0-1 FALLS W/OUT INJ PAST YR: ICD-10-PCS | Mod: CPTII,S$GLB,, | Performed by: ORTHOPAEDIC SURGERY

## 2021-10-08 PROCEDURE — 1160F PR REVIEW ALL MEDS BY PRESCRIBER/CLIN PHARMACIST DOCUMENTED: ICD-10-PCS | Mod: CPTII,S$GLB,, | Performed by: ORTHOPAEDIC SURGERY

## 2021-10-08 PROCEDURE — 1101F PT FALLS ASSESS-DOCD LE1/YR: CPT | Mod: CPTII,S$GLB,, | Performed by: ORTHOPAEDIC SURGERY

## 2021-10-08 PROCEDURE — 1160F RVW MEDS BY RX/DR IN RCRD: CPT | Mod: CPTII,S$GLB,, | Performed by: ORTHOPAEDIC SURGERY

## 2021-10-08 PROCEDURE — 73502 XR HIP WITH PELVIS WHEN PERFORMED, 2 OR 3  VIEWS RIGHT: ICD-10-PCS | Mod: 26,RT,, | Performed by: RADIOLOGY

## 2021-10-08 PROCEDURE — 1157F PR ADVANCE CARE PLAN OR EQUIV PRESENT IN MEDICAL RECORD: ICD-10-PCS | Mod: CPTII,S$GLB,, | Performed by: ORTHOPAEDIC SURGERY

## 2021-10-08 PROCEDURE — 99999 PR PBB SHADOW E&M-EST. PATIENT-LVL IV: CPT | Mod: PBBFAC,,, | Performed by: ORTHOPAEDIC SURGERY

## 2021-10-08 PROCEDURE — 1157F ADVNC CARE PLAN IN RCRD: CPT | Mod: CPTII,S$GLB,, | Performed by: ORTHOPAEDIC SURGERY

## 2021-10-08 PROCEDURE — 99999 PR PBB SHADOW E&M-EST. PATIENT-LVL IV: ICD-10-PCS | Mod: PBBFAC,,, | Performed by: ORTHOPAEDIC SURGERY

## 2021-10-08 PROCEDURE — 1111F PR DISCHARGE MEDS RECONCILED W/ CURRENT OUTPATIENT MED LIST: ICD-10-PCS | Mod: CPTII,S$GLB,, | Performed by: ORTHOPAEDIC SURGERY

## 2021-10-08 PROCEDURE — 73502 X-RAY EXAM HIP UNI 2-3 VIEWS: CPT | Mod: TC,PN,RT

## 2021-10-08 PROCEDURE — 73502 X-RAY EXAM HIP UNI 2-3 VIEWS: CPT | Mod: 26,RT,, | Performed by: RADIOLOGY

## 2021-10-08 PROCEDURE — 3288F PR FALLS RISK ASSESSMENT DOCUMENTED: ICD-10-PCS | Mod: CPTII,S$GLB,, | Performed by: ORTHOPAEDIC SURGERY

## 2021-10-08 NOTE — HPI
"Blaire Cage is an 87 y.o. female with PMHx significant for COPD, DM2, hypothyroidism, and HTN.  She was admitted to the service of hospital medicine with CAP.  She presented to the ED with the gradual onset of fatigue that began x 1 week ago associated with worsening BLE paresthesias, SOB, and "feeling like something is in my throat and chest". She reports she is more fatigued with activity.  She reports an occasional cough, as well as dysphagia.  She states she tries to modify her diet to soft foods only, otherwise she feels like things get stuck.  She reports a "weird sensation" when eating. She denies associated nausea, emesis, but states that she "feels bad". No fever or chills.  Other pertinent medical history as below:  " Initiate Treatment: Spironolactone 100 mg tab, Tri-Rocio 0.01 %-4 %-0.05 % topical cream Detail Level: Zone Render In Strict Bullet Format?: No

## 2021-10-14 ENCOUNTER — INFUSION (OUTPATIENT)
Dept: INFUSION THERAPY | Facility: HOSPITAL | Age: 86
End: 2021-10-14
Attending: INTERNAL MEDICINE
Payer: MEDICARE

## 2021-10-14 VITALS
BODY MASS INDEX: 22.73 KG/M2 | OXYGEN SATURATION: 95 % | TEMPERATURE: 98 F | SYSTOLIC BLOOD PRESSURE: 167 MMHG | HEART RATE: 78 BPM | DIASTOLIC BLOOD PRESSURE: 54 MMHG | RESPIRATION RATE: 18 BRPM | WEIGHT: 128.31 LBS

## 2021-10-14 DIAGNOSIS — N18.30 STAGE 3 CHRONIC KIDNEY DISEASE, UNSPECIFIED WHETHER STAGE 3A OR 3B CKD: ICD-10-CM

## 2021-10-14 DIAGNOSIS — D64.9 NORMOCYTIC NORMOCHROMIC ANEMIA: Primary | ICD-10-CM

## 2021-10-14 PROCEDURE — 96372 THER/PROPH/DIAG INJ SC/IM: CPT

## 2021-10-14 PROCEDURE — 63600175 PHARM REV CODE 636 W HCPCS: Mod: JG,EC | Performed by: INTERNAL MEDICINE

## 2021-10-14 RX ADMIN — EPOETIN ALFA-EPBX 3000 UNITS: 3000 INJECTION, SOLUTION INTRAVENOUS; SUBCUTANEOUS at 01:10

## 2021-10-15 ENCOUNTER — HOSPITAL ENCOUNTER (OUTPATIENT)
Dept: CARDIOLOGY | Facility: CLINIC | Age: 86
Discharge: HOME | End: 2021-10-15
Attending: INTERNAL MEDICINE
Payer: MEDICARE

## 2021-10-15 DIAGNOSIS — I51.89 LEFT VENTRICULAR DIASTOLIC DYSFUNCTION WITH PRESERVED SYSTOLIC FUNCTION: ICD-10-CM

## 2021-10-15 DIAGNOSIS — Z95.818 PRESENCE OF OTHER CARDIAC IMPLANTS AND GRAFTS: ICD-10-CM

## 2021-10-15 PROCEDURE — 93298 REM INTERROG DEV EVAL SCRMS: CPT | Mod: S$GLB,,, | Performed by: INTERNAL MEDICINE

## 2021-10-15 PROCEDURE — 93298 CARDIAC DEVICE CHECK - REMOTE: ICD-10-PCS | Mod: S$GLB,,, | Performed by: INTERNAL MEDICINE

## 2021-10-19 ENCOUNTER — LAB VISIT (OUTPATIENT)
Dept: LAB | Facility: HOSPITAL | Age: 86
End: 2021-10-19
Attending: NURSE PRACTITIONER
Payer: MEDICARE

## 2021-10-19 ENCOUNTER — DOCUMENT SCAN (OUTPATIENT)
Dept: HOME HEALTH SERVICES | Facility: HOSPITAL | Age: 86
End: 2021-10-19
Payer: MEDICARE

## 2021-10-19 ENCOUNTER — HOSPITAL ENCOUNTER (OUTPATIENT)
Facility: HOSPITAL | Age: 86
Discharge: HOME | End: 2021-10-21
Attending: EMERGENCY MEDICINE | Admitting: INTERNAL MEDICINE
Payer: MEDICARE

## 2021-10-19 ENCOUNTER — TELEPHONE (OUTPATIENT)
Dept: UROLOGY | Facility: CLINIC | Age: 86
End: 2021-10-19

## 2021-10-19 ENCOUNTER — TELEPHONE (OUTPATIENT)
Dept: FAMILY MEDICINE | Facility: CLINIC | Age: 86
End: 2021-10-19

## 2021-10-19 DIAGNOSIS — R06.02 SOB (SHORTNESS OF BREATH): ICD-10-CM

## 2021-10-19 DIAGNOSIS — L03.115 CELLULITIS OF RIGHT LOWER EXTREMITY: Primary | ICD-10-CM

## 2021-10-19 DIAGNOSIS — I50.9 HEART FAILURE: ICD-10-CM

## 2021-10-19 DIAGNOSIS — I50.33 ACUTE ON CHRONIC DIASTOLIC HEART FAILURE: ICD-10-CM

## 2021-10-19 DIAGNOSIS — N39.0 RECURRENT UTI (URINARY TRACT INFECTION): ICD-10-CM

## 2021-10-19 DIAGNOSIS — R07.9 CHEST PAIN: ICD-10-CM

## 2021-10-19 DIAGNOSIS — N39.0 RECURRENT UTI (URINARY TRACT INFECTION): Primary | ICD-10-CM

## 2021-10-19 PROBLEM — J96.21 ACUTE ON CHRONIC RESPIRATORY FAILURE WITH HYPOXIA: Status: RESOLVED | Noted: 2021-09-21 | Resolved: 2021-10-19

## 2021-10-19 PROBLEM — U07.1 COVID-19: Status: RESOLVED | Noted: 2021-09-21 | Resolved: 2021-10-19

## 2021-10-19 LAB
ALBUMIN SERPL BCP-MCNC: 3.5 G/DL (ref 3.5–5.2)
ALP SERPL-CCNC: 86 U/L (ref 55–135)
ALT SERPL W/O P-5'-P-CCNC: 21 U/L (ref 10–44)
ANION GAP SERPL CALC-SCNC: 12 MMOL/L (ref 8–16)
APTT PPP: 36.5 SEC (ref 23.3–35.1)
AST SERPL-CCNC: 30 U/L (ref 10–40)
BACTERIA #/AREA URNS HPF: NEGATIVE /HPF
BASOPHILS # BLD AUTO: 0.03 K/UL (ref 0–0.2)
BASOPHILS NFR BLD: 0.5 % (ref 0–1.9)
BILIRUB SERPL-MCNC: 0.5 MG/DL (ref 0.1–1)
BILIRUB UR QL STRIP: NEGATIVE
BNP SERPL-MCNC: 412 PG/ML (ref 0–99)
BUN SERPL-MCNC: 26 MG/DL (ref 10–30)
CALCIUM SERPL-MCNC: 9.4 MG/DL (ref 8.7–10.5)
CHLORIDE SERPL-SCNC: 108 MMOL/L (ref 95–110)
CLARITY UR: CLEAR
CO2 SERPL-SCNC: 22 MMOL/L (ref 23–29)
COLOR UR: YELLOW
CREAT SERPL-MCNC: 1.6 MG/DL (ref 0.5–1.4)
DIFFERENTIAL METHOD: ABNORMAL
EOSINOPHIL # BLD AUTO: 0.3 K/UL (ref 0–0.5)
EOSINOPHIL NFR BLD: 4.8 % (ref 0–8)
ERYTHROCYTE [DISTWIDTH] IN BLOOD BY AUTOMATED COUNT: 14 % (ref 11.5–14.5)
EST. GFR  (AFRICAN AMERICAN): 32.2 ML/MIN/1.73 M^2
EST. GFR  (NON AFRICAN AMERICAN): 28 ML/MIN/1.73 M^2
GLUCOSE SERPL-MCNC: 137 MG/DL (ref 70–110)
GLUCOSE UR QL STRIP: NEGATIVE
HCT VFR BLD AUTO: 32.1 % (ref 37–48.5)
HGB BLD-MCNC: 10.5 G/DL (ref 12–16)
HGB UR QL STRIP: NEGATIVE
HYALINE CASTS #/AREA URNS LPF: 21 /LPF
IMM GRANULOCYTES # BLD AUTO: 0.02 K/UL (ref 0–0.04)
IMM GRANULOCYTES NFR BLD AUTO: 0.4 % (ref 0–0.5)
INR PPP: 1.4
KETONES UR QL STRIP: NEGATIVE
LEUKOCYTE ESTERASE UR QL STRIP: NEGATIVE
LYMPHOCYTES # BLD AUTO: 1.1 K/UL (ref 1–4.8)
LYMPHOCYTES NFR BLD: 19.3 % (ref 18–48)
MAGNESIUM SERPL-MCNC: 2 MG/DL (ref 1.6–2.6)
MCH RBC QN AUTO: 32.7 PG (ref 27–31)
MCHC RBC AUTO-ENTMCNC: 32.7 G/DL (ref 32–36)
MCV RBC AUTO: 100 FL (ref 82–98)
MICROSCOPIC COMMENT: ABNORMAL
MONOCYTES # BLD AUTO: 0.5 K/UL (ref 0.3–1)
MONOCYTES NFR BLD: 8.8 % (ref 4–15)
NEUTROPHILS # BLD AUTO: 3.7 K/UL (ref 1.8–7.7)
NEUTROPHILS NFR BLD: 66.2 % (ref 38–73)
NITRITE UR QL STRIP: NEGATIVE
NRBC BLD-RTO: 0 /100 WBC
PH UR STRIP: 6 [PH] (ref 5–8)
PHOSPHATE SERPL-MCNC: 4 MG/DL (ref 2.7–4.5)
PLATELET # BLD AUTO: 234 K/UL (ref 150–450)
PMV BLD AUTO: 10.5 FL (ref 9.2–12.9)
POTASSIUM SERPL-SCNC: 4.7 MMOL/L (ref 3.5–5.1)
PROT SERPL-MCNC: 6.9 G/DL (ref 6–8.4)
PROT UR QL STRIP: ABNORMAL
PROTHROMBIN TIME: 16.5 SEC (ref 11.4–13.7)
RBC # BLD AUTO: 3.21 M/UL (ref 4–5.4)
RBC #/AREA URNS HPF: 1 /HPF (ref 0–4)
SODIUM SERPL-SCNC: 142 MMOL/L (ref 136–145)
SP GR UR STRIP: 1.01 (ref 1–1.03)
SQUAMOUS #/AREA URNS HPF: 1 /HPF
URN SPEC COLLECT METH UR: ABNORMAL
UROBILINOGEN UR STRIP-ACNC: NEGATIVE EU/DL
WBC # BLD AUTO: 5.6 K/UL (ref 3.9–12.7)
WBC #/AREA URNS HPF: 2 /HPF (ref 0–5)

## 2021-10-19 PROCEDURE — 82746 ASSAY OF FOLIC ACID SERUM: CPT | Performed by: STUDENT IN AN ORGANIZED HEALTH CARE EDUCATION/TRAINING PROGRAM

## 2021-10-19 PROCEDURE — 93005 ELECTROCARDIOGRAM TRACING: CPT | Performed by: INTERNAL MEDICINE

## 2021-10-19 PROCEDURE — 87086 URINE CULTURE/COLONY COUNT: CPT | Performed by: NURSE PRACTITIONER

## 2021-10-19 PROCEDURE — G0378 HOSPITAL OBSERVATION PER HR: HCPCS

## 2021-10-19 PROCEDURE — 94640 AIRWAY INHALATION TREATMENT: CPT

## 2021-10-19 PROCEDURE — 63600175 PHARM REV CODE 636 W HCPCS: Performed by: STUDENT IN AN ORGANIZED HEALTH CARE EDUCATION/TRAINING PROGRAM

## 2021-10-19 PROCEDURE — 93010 EKG 12-LEAD: ICD-10-PCS | Mod: ,,, | Performed by: INTERNAL MEDICINE

## 2021-10-19 PROCEDURE — 80053 COMPREHEN METABOLIC PANEL: CPT | Performed by: STUDENT IN AN ORGANIZED HEALTH CARE EDUCATION/TRAINING PROGRAM

## 2021-10-19 PROCEDURE — 85610 PROTHROMBIN TIME: CPT | Performed by: STUDENT IN AN ORGANIZED HEALTH CARE EDUCATION/TRAINING PROGRAM

## 2021-10-19 PROCEDURE — 85025 COMPLETE CBC W/AUTO DIFF WBC: CPT | Performed by: STUDENT IN AN ORGANIZED HEALTH CARE EDUCATION/TRAINING PROGRAM

## 2021-10-19 PROCEDURE — 99285 EMERGENCY DEPT VISIT HI MDM: CPT | Mod: 25

## 2021-10-19 PROCEDURE — 83880 ASSAY OF NATRIURETIC PEPTIDE: CPT | Performed by: STUDENT IN AN ORGANIZED HEALTH CARE EDUCATION/TRAINING PROGRAM

## 2021-10-19 PROCEDURE — 36415 COLL VENOUS BLD VENIPUNCTURE: CPT | Performed by: STUDENT IN AN ORGANIZED HEALTH CARE EDUCATION/TRAINING PROGRAM

## 2021-10-19 PROCEDURE — 99900031 HC PATIENT EDUCATION (STAT)

## 2021-10-19 PROCEDURE — 85730 THROMBOPLASTIN TIME PARTIAL: CPT | Performed by: STUDENT IN AN ORGANIZED HEALTH CARE EDUCATION/TRAINING PROGRAM

## 2021-10-19 PROCEDURE — 99900035 HC TECH TIME PER 15 MIN (STAT)

## 2021-10-19 PROCEDURE — 94760 N-INVAS EAR/PLS OXIMETRY 1: CPT

## 2021-10-19 PROCEDURE — 94799 UNLISTED PULMONARY SVC/PX: CPT

## 2021-10-19 PROCEDURE — 93010 ELECTROCARDIOGRAM REPORT: CPT | Mod: ,,, | Performed by: INTERNAL MEDICINE

## 2021-10-19 PROCEDURE — 84100 ASSAY OF PHOSPHORUS: CPT | Performed by: STUDENT IN AN ORGANIZED HEALTH CARE EDUCATION/TRAINING PROGRAM

## 2021-10-19 PROCEDURE — 83735 ASSAY OF MAGNESIUM: CPT | Performed by: STUDENT IN AN ORGANIZED HEALTH CARE EDUCATION/TRAINING PROGRAM

## 2021-10-19 PROCEDURE — 82607 VITAMIN B-12: CPT | Performed by: STUDENT IN AN ORGANIZED HEALTH CARE EDUCATION/TRAINING PROGRAM

## 2021-10-19 PROCEDURE — 96374 THER/PROPH/DIAG INJ IV PUSH: CPT

## 2021-10-19 PROCEDURE — 81001 URINALYSIS AUTO W/SCOPE: CPT | Performed by: NURSE PRACTITIONER

## 2021-10-19 PROCEDURE — 25000242 PHARM REV CODE 250 ALT 637 W/ HCPCS: Performed by: STUDENT IN AN ORGANIZED HEALTH CARE EDUCATION/TRAINING PROGRAM

## 2021-10-19 RX ORDER — AMLODIPINE BESYLATE 5 MG/1
5 TABLET ORAL 2 TIMES DAILY
Status: DISCONTINUED | OUTPATIENT
Start: 2021-10-19 | End: 2021-10-21 | Stop reason: HOSPADM

## 2021-10-19 RX ORDER — POTASSIUM CHLORIDE 20 MEQ/1
20 TABLET, EXTENDED RELEASE ORAL
Status: DISCONTINUED | OUTPATIENT
Start: 2021-10-19 | End: 2021-10-21 | Stop reason: HOSPADM

## 2021-10-19 RX ORDER — ONDANSETRON 2 MG/ML
4 INJECTION INTRAMUSCULAR; INTRAVENOUS EVERY 8 HOURS PRN
Status: DISCONTINUED | OUTPATIENT
Start: 2021-10-19 | End: 2021-10-21 | Stop reason: HOSPADM

## 2021-10-19 RX ORDER — MAGNESIUM SULFATE HEPTAHYDRATE 40 MG/ML
2 INJECTION, SOLUTION INTRAVENOUS
Status: DISCONTINUED | OUTPATIENT
Start: 2021-10-19 | End: 2021-10-21 | Stop reason: HOSPADM

## 2021-10-19 RX ORDER — LANOLIN ALCOHOL/MO/W.PET/CERES
400 CREAM (GRAM) TOPICAL DAILY
Status: DISCONTINUED | OUTPATIENT
Start: 2021-10-20 | End: 2021-10-21 | Stop reason: HOSPADM

## 2021-10-19 RX ORDER — IPRATROPIUM BROMIDE AND ALBUTEROL SULFATE 2.5; .5 MG/3ML; MG/3ML
3 SOLUTION RESPIRATORY (INHALATION)
Status: COMPLETED | OUTPATIENT
Start: 2021-10-19 | End: 2021-10-19

## 2021-10-19 RX ORDER — LANOLIN ALCOHOL/MO/W.PET/CERES
800 CREAM (GRAM) TOPICAL
Status: DISCONTINUED | OUTPATIENT
Start: 2021-10-19 | End: 2021-10-21 | Stop reason: HOSPADM

## 2021-10-19 RX ORDER — GLUCAGON 1 MG
1 KIT INJECTION
Status: DISCONTINUED | OUTPATIENT
Start: 2021-10-19 | End: 2021-10-21 | Stop reason: HOSPADM

## 2021-10-19 RX ORDER — MONTELUKAST SODIUM 10 MG/1
10 TABLET ORAL NIGHTLY
Status: DISCONTINUED | OUTPATIENT
Start: 2021-10-19 | End: 2021-10-21 | Stop reason: HOSPADM

## 2021-10-19 RX ORDER — SODIUM CHLORIDE 0.9 % (FLUSH) 0.9 %
10 SYRINGE (ML) INJECTION EVERY 12 HOURS PRN
Status: DISCONTINUED | OUTPATIENT
Start: 2021-10-19 | End: 2021-10-21 | Stop reason: HOSPADM

## 2021-10-19 RX ORDER — TAMSULOSIN HYDROCHLORIDE 0.4 MG/1
0.4 CAPSULE ORAL DAILY
Status: DISCONTINUED | OUTPATIENT
Start: 2021-10-20 | End: 2021-10-21 | Stop reason: HOSPADM

## 2021-10-19 RX ORDER — INSULIN ASPART 100 [IU]/ML
0-5 INJECTION, SOLUTION INTRAVENOUS; SUBCUTANEOUS
Status: DISCONTINUED | OUTPATIENT
Start: 2021-10-19 | End: 2021-10-21 | Stop reason: HOSPADM

## 2021-10-19 RX ORDER — POLYETHYLENE GLYCOL 3350 17 G/17G
17 POWDER, FOR SOLUTION ORAL 2 TIMES DAILY
Status: DISCONTINUED | OUTPATIENT
Start: 2021-10-19 | End: 2021-10-21 | Stop reason: HOSPADM

## 2021-10-19 RX ORDER — FLUTICASONE PROPIONATE 50 MCG
2 SPRAY, SUSPENSION (ML) NASAL DAILY
Status: DISCONTINUED | OUTPATIENT
Start: 2021-10-20 | End: 2021-10-21 | Stop reason: HOSPADM

## 2021-10-19 RX ORDER — POTASSIUM CHLORIDE 20 MEQ/1
40 TABLET, EXTENDED RELEASE ORAL
Status: DISCONTINUED | OUTPATIENT
Start: 2021-10-19 | End: 2021-10-21 | Stop reason: HOSPADM

## 2021-10-19 RX ORDER — NITROGLYCERIN 0.4 MG/1
0.4 TABLET SUBLINGUAL EVERY 5 MIN PRN
Status: DISCONTINUED | OUTPATIENT
Start: 2021-10-19 | End: 2021-10-21 | Stop reason: HOSPADM

## 2021-10-19 RX ORDER — FERROUS SULFATE 325(65) MG
325 TABLET ORAL DAILY
Status: ON HOLD | COMMUNITY
End: 2022-01-01 | Stop reason: HOSPADM

## 2021-10-19 RX ORDER — POTASSIUM CHLORIDE 7.45 MG/ML
40 INJECTION INTRAVENOUS
Status: DISCONTINUED | OUTPATIENT
Start: 2021-10-19 | End: 2021-10-21 | Stop reason: HOSPADM

## 2021-10-19 RX ORDER — POTASSIUM CHLORIDE 7.45 MG/ML
20 INJECTION INTRAVENOUS
Status: DISCONTINUED | OUTPATIENT
Start: 2021-10-19 | End: 2021-10-21 | Stop reason: HOSPADM

## 2021-10-19 RX ORDER — ALBUTEROL SULFATE 0.83 MG/ML
2.5 SOLUTION RESPIRATORY (INHALATION) EVERY 6 HOURS
Status: DISCONTINUED | OUTPATIENT
Start: 2021-10-20 | End: 2021-10-19

## 2021-10-19 RX ORDER — ASCORBIC ACID 500 MG
500 TABLET ORAL DAILY
Status: DISCONTINUED | OUTPATIENT
Start: 2021-10-20 | End: 2021-10-20

## 2021-10-19 RX ORDER — IBUPROFEN 200 MG
16 TABLET ORAL
Status: DISCONTINUED | OUTPATIENT
Start: 2021-10-19 | End: 2021-10-21 | Stop reason: HOSPADM

## 2021-10-19 RX ORDER — SERTRALINE HYDROCHLORIDE 25 MG/1
25 TABLET, FILM COATED ORAL DAILY
Status: DISCONTINUED | OUTPATIENT
Start: 2021-10-20 | End: 2021-10-21 | Stop reason: HOSPADM

## 2021-10-19 RX ORDER — MECLIZINE HCL 12.5 MG 12.5 MG/1
25 TABLET ORAL 3 TIMES DAILY PRN
Status: DISCONTINUED | OUTPATIENT
Start: 2021-10-19 | End: 2021-10-21 | Stop reason: HOSPADM

## 2021-10-19 RX ORDER — LANOLIN ALCOHOL/MO/W.PET/CERES
1 CREAM (GRAM) TOPICAL DAILY
Status: DISCONTINUED | OUTPATIENT
Start: 2021-10-20 | End: 2021-10-21 | Stop reason: HOSPADM

## 2021-10-19 RX ORDER — MAGNESIUM SULFATE HEPTAHYDRATE 40 MG/ML
4 INJECTION, SOLUTION INTRAVENOUS
Status: DISCONTINUED | OUTPATIENT
Start: 2021-10-19 | End: 2021-10-21 | Stop reason: HOSPADM

## 2021-10-19 RX ORDER — FUROSEMIDE 10 MG/ML
20 INJECTION INTRAMUSCULAR; INTRAVENOUS
Status: COMPLETED | OUTPATIENT
Start: 2021-10-19 | End: 2021-10-19

## 2021-10-19 RX ORDER — MAGNESIUM SULFATE 1 G/100ML
1 INJECTION INTRAVENOUS
Status: DISCONTINUED | OUTPATIENT
Start: 2021-10-19 | End: 2021-10-21 | Stop reason: HOSPADM

## 2021-10-19 RX ORDER — ATORVASTATIN CALCIUM 20 MG/1
20 TABLET, FILM COATED ORAL DAILY
Status: DISCONTINUED | OUTPATIENT
Start: 2021-10-20 | End: 2021-10-21 | Stop reason: HOSPADM

## 2021-10-19 RX ORDER — TALC
6 POWDER (GRAM) TOPICAL NIGHTLY PRN
Status: DISCONTINUED | OUTPATIENT
Start: 2021-10-19 | End: 2021-10-21 | Stop reason: HOSPADM

## 2021-10-19 RX ORDER — ACETAMINOPHEN 325 MG/1
650 TABLET ORAL EVERY 4 HOURS PRN
Status: DISCONTINUED | OUTPATIENT
Start: 2021-10-19 | End: 2021-10-21 | Stop reason: HOSPADM

## 2021-10-19 RX ORDER — IBUPROFEN 200 MG
24 TABLET ORAL
Status: DISCONTINUED | OUTPATIENT
Start: 2021-10-19 | End: 2021-10-21 | Stop reason: HOSPADM

## 2021-10-19 RX ORDER — FUROSEMIDE 10 MG/ML
20 INJECTION INTRAMUSCULAR; INTRAVENOUS
Status: DISCONTINUED | OUTPATIENT
Start: 2021-10-20 | End: 2021-10-20

## 2021-10-19 RX ORDER — ALLOPURINOL 100 MG/1
100 TABLET ORAL DAILY
Status: DISCONTINUED | OUTPATIENT
Start: 2021-10-20 | End: 2021-10-21 | Stop reason: HOSPADM

## 2021-10-19 RX ORDER — LEVOTHYROXINE SODIUM 88 UG/1
88 TABLET ORAL
Status: DISCONTINUED | OUTPATIENT
Start: 2021-10-20 | End: 2021-10-21 | Stop reason: HOSPADM

## 2021-10-19 RX ORDER — ALBUTEROL SULFATE 0.83 MG/ML
2.5 SOLUTION RESPIRATORY (INHALATION)
Status: DISCONTINUED | OUTPATIENT
Start: 2021-10-20 | End: 2021-10-21 | Stop reason: HOSPADM

## 2021-10-19 RX ORDER — PANTOPRAZOLE SODIUM 40 MG/1
40 TABLET, DELAYED RELEASE ORAL
Status: DISCONTINUED | OUTPATIENT
Start: 2021-10-20 | End: 2021-10-21 | Stop reason: HOSPADM

## 2021-10-19 RX ADMIN — FUROSEMIDE 20 MG: 10 INJECTION, SOLUTION INTRAMUSCULAR; INTRAVENOUS at 08:10

## 2021-10-19 RX ADMIN — IPRATROPIUM BROMIDE AND ALBUTEROL SULFATE 3 ML: .5; 3 SOLUTION RESPIRATORY (INHALATION) at 08:10

## 2021-10-20 ENCOUNTER — CLINICAL SUPPORT (OUTPATIENT)
Dept: CARDIOLOGY | Facility: HOSPITAL | Age: 86
End: 2021-10-20
Payer: MEDICARE

## 2021-10-20 PROBLEM — L03.115 CELLULITIS OF RIGHT LOWER EXTREMITY: Status: ACTIVE | Noted: 2021-10-20

## 2021-10-20 LAB
ANION GAP SERPL CALC-SCNC: 8 MMOL/L (ref 8–16)
AORTIC ROOT ANNULUS: 2.63 CM
AORTIC VALVE CUSP SEPERATION: 1.88 CM
AV INDEX (PROSTH): 0.62
AV MEAN GRADIENT: 8 MMHG
AV PEAK GRADIENT: 14 MMHG
AV VALVE AREA: 2 CM2
AV VELOCITY RATIO: 59.98
BASOPHILS # BLD AUTO: 0.03 K/UL (ref 0–0.2)
BASOPHILS NFR BLD: 0.6 % (ref 0–1.9)
BSA FOR ECHO PROCEDURE: 1.62 M2
BUN SERPL-MCNC: 26 MG/DL (ref 10–30)
CALCIUM SERPL-MCNC: 8.8 MG/DL (ref 8.7–10.5)
CHLORIDE SERPL-SCNC: 109 MMOL/L (ref 95–110)
CO2 SERPL-SCNC: 26 MMOL/L (ref 23–29)
CREAT SERPL-MCNC: 1.4 MG/DL (ref 0.5–1.4)
CV ECHO LV RWT: 0.93 CM
DIFFERENTIAL METHOD: ABNORMAL
DOP CALC AO PEAK VEL: 1.88 M/S
DOP CALC AO VTI: 45.06 CM
DOP CALC LVOT AREA: 3.2 CM2
DOP CALC LVOT DIAMETER: 2.03 CM
DOP CALC LVOT PEAK VEL: 112.77 M/S
DOP CALC LVOT STROKE VOLUME: 90.29 CM3
DOP CALCLVOT PEAK VEL VTI: 27.91 CM
E WAVE DECELERATION TIME: 365.49 MSEC
E/A RATIO: 0.97
E/E' RATIO: 36.8 M/S
ECHO LV POSTERIOR WALL: 1.56 CM (ref 0.6–1.1)
EJECTION FRACTION: 60 %
EOSINOPHIL # BLD AUTO: 0.4 K/UL (ref 0–0.5)
EOSINOPHIL NFR BLD: 7.9 % (ref 0–8)
ERYTHROCYTE [DISTWIDTH] IN BLOOD BY AUTOMATED COUNT: 13.8 % (ref 11.5–14.5)
EST. GFR  (AFRICAN AMERICAN): 37.9 ML/MIN/1.73 M^2
EST. GFR  (NON AFRICAN AMERICAN): 32.9 ML/MIN/1.73 M^2
FERRITIN SERPL-MCNC: 479 NG/ML (ref 20–300)
FOLATE SERPL-MCNC: >24.8 NG/ML (ref 4–24)
FRACTIONAL SHORTENING: 27 % (ref 28–44)
GLUCOSE SERPL-MCNC: 115 MG/DL (ref 70–110)
GLUCOSE SERPL-MCNC: 116 MG/DL (ref 70–110)
GLUCOSE SERPL-MCNC: 117 MG/DL (ref 70–110)
GLUCOSE SERPL-MCNC: 126 MG/DL (ref 70–110)
GLUCOSE SERPL-MCNC: 168 MG/DL (ref 70–110)
HCT VFR BLD AUTO: 26.8 % (ref 37–48.5)
HGB BLD-MCNC: 8.8 G/DL (ref 12–16)
IMM GRANULOCYTES # BLD AUTO: 0.02 K/UL (ref 0–0.04)
IMM GRANULOCYTES NFR BLD AUTO: 0.4 % (ref 0–0.5)
INTERVENTRICULAR SEPTUM: 1.55 CM (ref 0.6–1.1)
IRON SERPL-MCNC: 27 UG/DL (ref 30–160)
LEFT ATRIUM SIZE: 3.18 CM
LEFT INTERNAL DIMENSION IN SYSTOLE: 2.46 CM (ref 2.1–4)
LEFT VENTRICLE DIASTOLIC VOLUME INDEX: 35.72 ML/M2
LEFT VENTRICLE DIASTOLIC VOLUME: 57.51 ML
LEFT VENTRICLE MASS INDEX: 121 G/M2
LEFT VENTRICLE SYSTOLIC VOLUME INDEX: 16.5 ML/M2
LEFT VENTRICLE SYSTOLIC VOLUME: 26.51 ML
LEFT VENTRICULAR INTERNAL DIMENSION IN DIASTOLE: 3.36 CM (ref 3.5–6)
LEFT VENTRICULAR MASS: 194.38 G
LV LATERAL E/E' RATIO: 36.8 M/S
LV SEPTAL E/E' RATIO: 36.8 M/S
LYMPHOCYTES # BLD AUTO: 1 K/UL (ref 1–4.8)
LYMPHOCYTES NFR BLD: 19.2 % (ref 18–48)
MAGNESIUM SERPL-MCNC: 1.9 MG/DL (ref 1.6–2.6)
MCH RBC QN AUTO: 33 PG (ref 27–31)
MCHC RBC AUTO-ENTMCNC: 32.8 G/DL (ref 32–36)
MCV RBC AUTO: 100 FL (ref 82–98)
MONOCYTES # BLD AUTO: 0.6 K/UL (ref 0.3–1)
MONOCYTES NFR BLD: 11.1 % (ref 4–15)
MV PEAK A VEL: 1.89 M/S
MV PEAK E VEL: 1.84 M/S
NEUTROPHILS # BLD AUTO: 3 K/UL (ref 1.8–7.7)
NEUTROPHILS NFR BLD: 60.8 % (ref 38–73)
NRBC BLD-RTO: 0 /100 WBC
PISA TR MAX VEL: 3.21 M/S
PLATELET # BLD AUTO: 211 K/UL (ref 150–450)
PMV BLD AUTO: 10.6 FL (ref 9.2–12.9)
POTASSIUM SERPL-SCNC: 4.3 MMOL/L (ref 3.5–5.1)
PV PEAK VELOCITY: 99.41 CM/S
RA PRESSURE: 3 MMHG
RBC # BLD AUTO: 2.67 M/UL (ref 4–5.4)
RIGHT VENTRICULAR END-DIASTOLIC DIMENSION: 314 CM
SATURATED IRON: 16 % (ref 20–50)
SODIUM SERPL-SCNC: 143 MMOL/L (ref 136–145)
TDI LATERAL: 0.05 M/S
TDI SEPTAL: 0.05 M/S
TDI: 0.05 M/S
TOTAL IRON BINDING CAPACITY: 169 UG/DL (ref 250–450)
TR MAX PG: 41 MMHG
TRANSFERRIN SERPL-MCNC: 121 MG/DL (ref 200–375)
TV REST PULMONARY ARTERY PRESSURE: 44 MMHG
VIT B12 SERPL-MCNC: 772 PG/ML (ref 210–950)
WBC # BLD AUTO: 4.96 K/UL (ref 3.9–12.7)

## 2021-10-20 PROCEDURE — 93306 ECHO (CUPID ONLY): ICD-10-PCS | Mod: 26,,, | Performed by: INTERNAL MEDICINE

## 2021-10-20 PROCEDURE — 94640 AIRWAY INHALATION TREATMENT: CPT

## 2021-10-20 PROCEDURE — 94761 N-INVAS EAR/PLS OXIMETRY MLT: CPT

## 2021-10-20 PROCEDURE — 85025 COMPLETE CBC W/AUTO DIFF WBC: CPT | Performed by: NURSE PRACTITIONER

## 2021-10-20 PROCEDURE — 80048 BASIC METABOLIC PNL TOTAL CA: CPT | Performed by: NURSE PRACTITIONER

## 2021-10-20 PROCEDURE — 63600175 PHARM REV CODE 636 W HCPCS: Performed by: NURSE PRACTITIONER

## 2021-10-20 PROCEDURE — G0378 HOSPITAL OBSERVATION PER HR: HCPCS

## 2021-10-20 PROCEDURE — 93306 TTE W/DOPPLER COMPLETE: CPT

## 2021-10-20 PROCEDURE — 94799 UNLISTED PULMONARY SVC/PX: CPT

## 2021-10-20 PROCEDURE — 96375 TX/PRO/DX INJ NEW DRUG ADDON: CPT | Mod: 59

## 2021-10-20 PROCEDURE — 82962 GLUCOSE BLOOD TEST: CPT

## 2021-10-20 PROCEDURE — 25000242 PHARM REV CODE 250 ALT 637 W/ HCPCS: Performed by: NURSE PRACTITIONER

## 2021-10-20 PROCEDURE — 83735 ASSAY OF MAGNESIUM: CPT | Performed by: NURSE PRACTITIONER

## 2021-10-20 PROCEDURE — 93306 TTE W/DOPPLER COMPLETE: CPT | Mod: 26,,, | Performed by: INTERNAL MEDICINE

## 2021-10-20 PROCEDURE — 36415 COLL VENOUS BLD VENIPUNCTURE: CPT | Performed by: NURSE PRACTITIONER

## 2021-10-20 PROCEDURE — 96376 TX/PRO/DX INJ SAME DRUG ADON: CPT

## 2021-10-20 PROCEDURE — 63600175 PHARM REV CODE 636 W HCPCS: Performed by: HOSPITALIST

## 2021-10-20 PROCEDURE — 25000003 PHARM REV CODE 250: Performed by: NURSE PRACTITIONER

## 2021-10-20 PROCEDURE — 99900031 HC PATIENT EDUCATION (STAT)

## 2021-10-20 PROCEDURE — 25000242 PHARM REV CODE 250 ALT 637 W/ HCPCS: Performed by: INTERNAL MEDICINE

## 2021-10-20 PROCEDURE — 84466 ASSAY OF TRANSFERRIN: CPT | Performed by: NURSE PRACTITIONER

## 2021-10-20 PROCEDURE — 99900035 HC TECH TIME PER 15 MIN (STAT)

## 2021-10-20 PROCEDURE — 82728 ASSAY OF FERRITIN: CPT | Performed by: NURSE PRACTITIONER

## 2021-10-20 RX ORDER — FLUTICASONE FUROATE AND VILANTEROL 100; 25 UG/1; UG/1
1 POWDER RESPIRATORY (INHALATION) DAILY
Status: DISCONTINUED | OUTPATIENT
Start: 2021-10-20 | End: 2021-10-21 | Stop reason: HOSPADM

## 2021-10-20 RX ORDER — FUROSEMIDE 10 MG/ML
20 INJECTION INTRAMUSCULAR; INTRAVENOUS
Status: DISCONTINUED | OUTPATIENT
Start: 2021-10-20 | End: 2021-10-21

## 2021-10-20 RX ADMIN — FUROSEMIDE 20 MG: 10 INJECTION, SOLUTION INTRAMUSCULAR; INTRAVENOUS at 04:10

## 2021-10-20 RX ADMIN — FUROSEMIDE 20 MG: 10 INJECTION, SOLUTION INTRAMUSCULAR; INTRAVENOUS at 10:10

## 2021-10-20 RX ADMIN — ATORVASTATIN CALCIUM 20 MG: 20 TABLET, FILM COATED ORAL at 10:10

## 2021-10-20 RX ADMIN — THERA TABS 1 TABLET: TAB at 10:10

## 2021-10-20 RX ADMIN — CEFTRIAXONE 1 G: 1 INJECTION, SOLUTION INTRAVENOUS at 12:10

## 2021-10-20 RX ADMIN — ALBUTEROL SULFATE 2.5 MG: 2.5 SOLUTION RESPIRATORY (INHALATION) at 07:10

## 2021-10-20 RX ADMIN — FLUTICASONE PROPIONATE 100 MCG: 50 SPRAY, METERED NASAL at 09:10

## 2021-10-20 RX ADMIN — AMLODIPINE BESYLATE 5 MG: 5 TABLET ORAL at 10:10

## 2021-10-20 RX ADMIN — ACETAMINOPHEN 650 MG: 325 TABLET ORAL at 07:10

## 2021-10-20 RX ADMIN — ACETAMINOPHEN 650 MG: 325 TABLET ORAL at 12:10

## 2021-10-20 RX ADMIN — TAMSULOSIN HYDROCHLORIDE 0.4 MG: 0.4 CAPSULE ORAL at 10:10

## 2021-10-20 RX ADMIN — AMLODIPINE BESYLATE 5 MG: 5 TABLET ORAL at 09:10

## 2021-10-20 RX ADMIN — POLYETHYLENE GLYCOL 3350 17 G: 17 POWDER, FOR SOLUTION ORAL at 09:10

## 2021-10-20 RX ADMIN — Medication 1 TABLET: at 10:10

## 2021-10-20 RX ADMIN — ALLOPURINOL 100 MG: 100 TABLET ORAL at 10:10

## 2021-10-20 RX ADMIN — FERROUS SULFATE TAB 325 MG (65 MG ELEMENTAL FE) 1 EACH: 325 (65 FE) TAB at 10:10

## 2021-10-20 RX ADMIN — ALBUTEROL SULFATE 2.5 MG: 2.5 SOLUTION RESPIRATORY (INHALATION) at 08:10

## 2021-10-20 RX ADMIN — MAGNESIUM OXIDE 400 MG: 400 TABLET ORAL at 10:10

## 2021-10-20 RX ADMIN — TIOTROPIUM BROMIDE INHALATION SPRAY 2 PUFF: 3.12 SPRAY, METERED RESPIRATORY (INHALATION) at 07:10

## 2021-10-20 RX ADMIN — MONTELUKAST 10 MG: 10 TABLET, FILM COATED ORAL at 09:10

## 2021-10-20 RX ADMIN — RIVAROXABAN 10 MG: 10 TABLET, FILM COATED ORAL at 04:10

## 2021-10-20 RX ADMIN — PANTOPRAZOLE SODIUM 40 MG: 40 TABLET, DELAYED RELEASE ORAL at 06:10

## 2021-10-20 RX ADMIN — LEVOTHYROXINE SODIUM 88 MCG: 88 TABLET ORAL at 06:10

## 2021-10-20 RX ADMIN — FLUTICASONE FUROATE AND VILANTEROL TRIFENATATE 1 PUFF: 100; 25 POWDER RESPIRATORY (INHALATION) at 07:10

## 2021-10-20 RX ADMIN — ALBUTEROL SULFATE 2.5 MG: 2.5 SOLUTION RESPIRATORY (INHALATION) at 01:10

## 2021-10-20 RX ADMIN — SERTRALINE HYDROCHLORIDE 25 MG: 25 TABLET ORAL at 10:10

## 2021-10-21 VITALS
HEART RATE: 72 BPM | DIASTOLIC BLOOD PRESSURE: 60 MMHG | OXYGEN SATURATION: 97 % | SYSTOLIC BLOOD PRESSURE: 160 MMHG | BODY MASS INDEX: 22.97 KG/M2 | WEIGHT: 129.63 LBS | TEMPERATURE: 98 F | RESPIRATION RATE: 18 BRPM | HEIGHT: 63 IN

## 2021-10-21 LAB
ALBUMIN SERPL BCP-MCNC: 2.8 G/DL (ref 3.5–5.2)
ANION GAP SERPL CALC-SCNC: 10 MMOL/L (ref 8–16)
BACTERIA UR CULT: NORMAL
BASOPHILS # BLD AUTO: 0.02 K/UL (ref 0–0.2)
BASOPHILS NFR BLD: 0.4 % (ref 0–1.9)
BUN SERPL-MCNC: 29 MG/DL (ref 10–30)
CALCIUM SERPL-MCNC: 8.5 MG/DL (ref 8.7–10.5)
CHLORIDE SERPL-SCNC: 106 MMOL/L (ref 95–110)
CO2 SERPL-SCNC: 25 MMOL/L (ref 23–29)
CREAT SERPL-MCNC: 1.6 MG/DL (ref 0.5–1.4)
DIFFERENTIAL METHOD: ABNORMAL
EOSINOPHIL # BLD AUTO: 0.4 K/UL (ref 0–0.5)
EOSINOPHIL NFR BLD: 7.9 % (ref 0–8)
ERYTHROCYTE [DISTWIDTH] IN BLOOD BY AUTOMATED COUNT: 13.9 % (ref 11.5–14.5)
EST. GFR  (AFRICAN AMERICAN): 32.2 ML/MIN/1.73 M^2
EST. GFR  (NON AFRICAN AMERICAN): 28 ML/MIN/1.73 M^2
GLUCOSE SERPL-MCNC: 114 MG/DL (ref 70–110)
GLUCOSE SERPL-MCNC: 118 MG/DL (ref 70–110)
HCT VFR BLD AUTO: 27.4 % (ref 37–48.5)
HGB BLD-MCNC: 8.9 G/DL (ref 12–16)
IMM GRANULOCYTES # BLD AUTO: 0.02 K/UL (ref 0–0.04)
IMM GRANULOCYTES NFR BLD AUTO: 0.4 % (ref 0–0.5)
LYMPHOCYTES # BLD AUTO: 0.9 K/UL (ref 1–4.8)
LYMPHOCYTES NFR BLD: 17.9 % (ref 18–48)
MAGNESIUM SERPL-MCNC: 2 MG/DL (ref 1.6–2.6)
MCH RBC QN AUTO: 32.4 PG (ref 27–31)
MCHC RBC AUTO-ENTMCNC: 32.5 G/DL (ref 32–36)
MCV RBC AUTO: 100 FL (ref 82–98)
MONOCYTES # BLD AUTO: 0.6 K/UL (ref 0.3–1)
MONOCYTES NFR BLD: 11.8 % (ref 4–15)
NEUTROPHILS # BLD AUTO: 3.1 K/UL (ref 1.8–7.7)
NEUTROPHILS NFR BLD: 61.6 % (ref 38–73)
NRBC BLD-RTO: 0 /100 WBC
PHOSPHATE SERPL-MCNC: 4.4 MG/DL (ref 2.7–4.5)
PLATELET # BLD AUTO: 206 K/UL (ref 150–450)
PMV BLD AUTO: 10.8 FL (ref 9.2–12.9)
POTASSIUM SERPL-SCNC: 4.1 MMOL/L (ref 3.5–5.1)
RBC # BLD AUTO: 2.75 M/UL (ref 4–5.4)
SODIUM SERPL-SCNC: 141 MMOL/L (ref 136–145)
WBC # BLD AUTO: 5.09 K/UL (ref 3.9–12.7)

## 2021-10-21 PROCEDURE — 25000242 PHARM REV CODE 250 ALT 637 W/ HCPCS: Performed by: INTERNAL MEDICINE

## 2021-10-21 PROCEDURE — 94640 AIRWAY INHALATION TREATMENT: CPT

## 2021-10-21 PROCEDURE — G0179 MD RECERTIFICATION HHA PT: HCPCS | Mod: ,,, | Performed by: FAMILY MEDICINE

## 2021-10-21 PROCEDURE — 80069 RENAL FUNCTION PANEL: CPT | Performed by: HOSPITALIST

## 2021-10-21 PROCEDURE — 99900035 HC TECH TIME PER 15 MIN (STAT)

## 2021-10-21 PROCEDURE — 83735 ASSAY OF MAGNESIUM: CPT | Performed by: NURSE PRACTITIONER

## 2021-10-21 PROCEDURE — 99900031 HC PATIENT EDUCATION (STAT)

## 2021-10-21 PROCEDURE — 96376 TX/PRO/DX INJ SAME DRUG ADON: CPT | Mod: 59

## 2021-10-21 PROCEDURE — 25000003 PHARM REV CODE 250: Performed by: NURSE PRACTITIONER

## 2021-10-21 PROCEDURE — G0179 PR HOME HEALTH MD RECERTIFICATION: ICD-10-PCS | Mod: ,,, | Performed by: FAMILY MEDICINE

## 2021-10-21 PROCEDURE — 36415 COLL VENOUS BLD VENIPUNCTURE: CPT | Performed by: NURSE PRACTITIONER

## 2021-10-21 PROCEDURE — G0378 HOSPITAL OBSERVATION PER HR: HCPCS

## 2021-10-21 PROCEDURE — 85025 COMPLETE CBC W/AUTO DIFF WBC: CPT | Performed by: NURSE PRACTITIONER

## 2021-10-21 PROCEDURE — 63600175 PHARM REV CODE 636 W HCPCS: Performed by: HOSPITALIST

## 2021-10-21 RX ORDER — CEPHALEXIN 500 MG/1
500 CAPSULE ORAL EVERY 8 HOURS
Qty: 15 CAPSULE | Refills: 0 | Status: SHIPPED | OUTPATIENT
Start: 2021-10-21 | End: 2021-10-26

## 2021-10-21 RX ADMIN — Medication: at 09:10

## 2021-10-21 RX ADMIN — ATORVASTATIN CALCIUM 20 MG: 20 TABLET, FILM COATED ORAL at 09:10

## 2021-10-21 RX ADMIN — FLUTICASONE PROPIONATE 100 MCG: 50 SPRAY, METERED NASAL at 09:10

## 2021-10-21 RX ADMIN — POLYETHYLENE GLYCOL 3350 17 G: 17 POWDER, FOR SOLUTION ORAL at 09:10

## 2021-10-21 RX ADMIN — PANTOPRAZOLE SODIUM 40 MG: 40 TABLET, DELAYED RELEASE ORAL at 05:10

## 2021-10-21 RX ADMIN — FUROSEMIDE 20 MG: 10 INJECTION, SOLUTION INTRAMUSCULAR; INTRAVENOUS at 05:10

## 2021-10-21 RX ADMIN — FLUTICASONE FUROATE AND VILANTEROL TRIFENATATE 1 PUFF: 100; 25 POWDER RESPIRATORY (INHALATION) at 08:10

## 2021-10-21 RX ADMIN — TIOTROPIUM BROMIDE INHALATION SPRAY 2 PUFF: 3.12 SPRAY, METERED RESPIRATORY (INHALATION) at 08:10

## 2021-10-21 RX ADMIN — THERA TABS 1 TABLET: TAB at 09:10

## 2021-10-21 RX ADMIN — ALLOPURINOL 100 MG: 100 TABLET ORAL at 09:10

## 2021-10-21 RX ADMIN — TAMSULOSIN HYDROCHLORIDE 0.4 MG: 0.4 CAPSULE ORAL at 09:10

## 2021-10-21 RX ADMIN — LEVOTHYROXINE SODIUM 88 MCG: 88 TABLET ORAL at 05:10

## 2021-10-21 RX ADMIN — AMLODIPINE BESYLATE 5 MG: 5 TABLET ORAL at 09:10

## 2021-10-21 RX ADMIN — ALBUTEROL SULFATE 2.5 MG: 2.5 SOLUTION RESPIRATORY (INHALATION) at 08:10

## 2021-10-21 RX ADMIN — CEFTRIAXONE 1 G: 1 INJECTION, SOLUTION INTRAVENOUS at 11:10

## 2021-10-21 RX ADMIN — FERROUS SULFATE TAB 325 MG (65 MG ELEMENTAL FE) 1 EACH: 325 (65 FE) TAB at 09:10

## 2021-10-21 RX ADMIN — MAGNESIUM OXIDE 400 MG: 400 TABLET ORAL at 09:10

## 2021-10-21 RX ADMIN — SERTRALINE HYDROCHLORIDE 25 MG: 25 TABLET ORAL at 09:10

## 2021-10-22 ENCOUNTER — TELEPHONE (OUTPATIENT)
Dept: CARDIOLOGY | Facility: CLINIC | Age: 86
End: 2021-10-22

## 2021-10-26 ENCOUNTER — LAB VISIT (OUTPATIENT)
Dept: LAB | Facility: HOSPITAL | Age: 86
End: 2021-10-26
Attending: FAMILY MEDICINE
Payer: MEDICARE

## 2021-10-26 DIAGNOSIS — D63.1 ANEMIA DUE TO STAGE 3 CHRONIC KIDNEY DISEASE TREATED WITH ERYTHROPOIETIN: ICD-10-CM

## 2021-10-26 DIAGNOSIS — D53.9 MACROCYTIC ANEMIA: ICD-10-CM

## 2021-10-26 DIAGNOSIS — D50.9 IRON DEFICIENCY ANEMIA, UNSPECIFIED IRON DEFICIENCY ANEMIA TYPE: ICD-10-CM

## 2021-10-26 DIAGNOSIS — N18.30 ANEMIA DUE TO STAGE 3 CHRONIC KIDNEY DISEASE TREATED WITH ERYTHROPOIETIN: ICD-10-CM

## 2021-10-26 DIAGNOSIS — D53.9 NUTRITIONAL ANEMIA, UNSPECIFIED: ICD-10-CM

## 2021-10-26 DIAGNOSIS — D64.9 NORMOCYTIC NORMOCHROMIC ANEMIA: ICD-10-CM

## 2021-10-26 DIAGNOSIS — D64.9 CHRONIC ANEMIA: ICD-10-CM

## 2021-10-26 PROCEDURE — 82746 ASSAY OF FOLIC ACID SERUM: CPT | Performed by: INTERNAL MEDICINE

## 2021-10-26 PROCEDURE — 85025 COMPLETE CBC W/AUTO DIFF WBC: CPT | Performed by: INTERNAL MEDICINE

## 2021-10-26 PROCEDURE — 82728 ASSAY OF FERRITIN: CPT | Performed by: INTERNAL MEDICINE

## 2021-10-26 PROCEDURE — 84466 ASSAY OF TRANSFERRIN: CPT | Performed by: INTERNAL MEDICINE

## 2021-10-26 PROCEDURE — 82607 VITAMIN B-12: CPT | Performed by: INTERNAL MEDICINE

## 2021-10-26 PROCEDURE — 80053 COMPREHEN METABOLIC PANEL: CPT | Performed by: INTERNAL MEDICINE

## 2021-10-27 ENCOUNTER — EXTERNAL HOME HEALTH (OUTPATIENT)
Dept: HOME HEALTH SERVICES | Facility: HOSPITAL | Age: 86
End: 2021-10-27
Payer: MEDICARE

## 2021-10-27 LAB
ALBUMIN SERPL BCP-MCNC: 2.9 G/DL (ref 3.5–5.2)
ALP SERPL-CCNC: 89 U/L (ref 55–135)
ALT SERPL W/O P-5'-P-CCNC: 17 U/L (ref 10–44)
ANION GAP SERPL CALC-SCNC: 13 MMOL/L (ref 8–16)
AST SERPL-CCNC: 25 U/L (ref 10–40)
BASOPHILS # BLD AUTO: 0.02 K/UL (ref 0–0.2)
BASOPHILS NFR BLD: 0.3 % (ref 0–1.9)
BILIRUB SERPL-MCNC: 0.3 MG/DL (ref 0.1–1)
BUN SERPL-MCNC: 29 MG/DL (ref 10–30)
CALCIUM SERPL-MCNC: 9 MG/DL (ref 8.7–10.5)
CHLORIDE SERPL-SCNC: 107 MMOL/L (ref 95–110)
CO2 SERPL-SCNC: 21 MMOL/L (ref 23–29)
CREAT SERPL-MCNC: 1.5 MG/DL (ref 0.5–1.4)
DIFFERENTIAL METHOD: ABNORMAL
EOSINOPHIL # BLD AUTO: 0.3 K/UL (ref 0–0.5)
EOSINOPHIL NFR BLD: 5.9 % (ref 0–8)
ERYTHROCYTE [DISTWIDTH] IN BLOOD BY AUTOMATED COUNT: 13.6 % (ref 11.5–14.5)
EST. GFR  (AFRICAN AMERICAN): 34.9 ML/MIN/1.73 M^2
EST. GFR  (NON AFRICAN AMERICAN): 30.2 ML/MIN/1.73 M^2
FERRITIN SERPL-MCNC: 643 NG/ML (ref 20–300)
FOLATE SERPL-MCNC: 19 NG/ML (ref 4–24)
GLUCOSE SERPL-MCNC: 124 MG/DL (ref 70–110)
HCT VFR BLD AUTO: 30.5 % (ref 37–48.5)
HGB BLD-MCNC: 9.8 G/DL (ref 12–16)
IMM GRANULOCYTES # BLD AUTO: 0.03 K/UL (ref 0–0.04)
IMM GRANULOCYTES NFR BLD AUTO: 0.5 % (ref 0–0.5)
IRON SERPL-MCNC: 36 UG/DL (ref 30–160)
LYMPHOCYTES # BLD AUTO: 1.2 K/UL (ref 1–4.8)
LYMPHOCYTES NFR BLD: 21 % (ref 18–48)
MCH RBC QN AUTO: 33.1 PG (ref 27–31)
MCHC RBC AUTO-ENTMCNC: 32.1 G/DL (ref 32–36)
MCV RBC AUTO: 103 FL (ref 82–98)
MONOCYTES # BLD AUTO: 0.5 K/UL (ref 0.3–1)
MONOCYTES NFR BLD: 9 % (ref 4–15)
NEUTROPHILS # BLD AUTO: 3.6 K/UL (ref 1.8–7.7)
NEUTROPHILS NFR BLD: 63.3 % (ref 38–73)
NRBC BLD-RTO: 0 /100 WBC
PLATELET # BLD AUTO: 222 K/UL (ref 150–450)
PMV BLD AUTO: 11.8 FL (ref 9.2–12.9)
POTASSIUM SERPL-SCNC: 4.1 MMOL/L (ref 3.5–5.1)
PROT SERPL-MCNC: 6.1 G/DL (ref 6–8.4)
RBC # BLD AUTO: 2.96 M/UL (ref 4–5.4)
SATURATED IRON: 16 % (ref 20–50)
SODIUM SERPL-SCNC: 141 MMOL/L (ref 136–145)
TOTAL IRON BINDING CAPACITY: 232 UG/DL (ref 250–450)
TRANSFERRIN SERPL-MCNC: 157 MG/DL (ref 200–375)
VIT B12 SERPL-MCNC: 858 PG/ML (ref 210–950)
WBC # BLD AUTO: 5.76 K/UL (ref 3.9–12.7)

## 2021-10-28 ENCOUNTER — INFUSION (OUTPATIENT)
Dept: INFUSION THERAPY | Facility: HOSPITAL | Age: 86
End: 2021-10-28
Attending: INTERNAL MEDICINE
Payer: MEDICARE

## 2021-10-28 VITALS
WEIGHT: 127.63 LBS | SYSTOLIC BLOOD PRESSURE: 160 MMHG | RESPIRATION RATE: 18 BRPM | HEART RATE: 73 BPM | DIASTOLIC BLOOD PRESSURE: 67 MMHG | TEMPERATURE: 98 F | OXYGEN SATURATION: 96 % | BODY MASS INDEX: 22.6 KG/M2

## 2021-10-28 DIAGNOSIS — D64.9 NORMOCYTIC NORMOCHROMIC ANEMIA: Primary | ICD-10-CM

## 2021-10-28 DIAGNOSIS — N18.30 STAGE 3 CHRONIC KIDNEY DISEASE, UNSPECIFIED WHETHER STAGE 3A OR 3B CKD: ICD-10-CM

## 2021-10-28 PROCEDURE — 96372 THER/PROPH/DIAG INJ SC/IM: CPT

## 2021-10-28 PROCEDURE — 63600175 PHARM REV CODE 636 W HCPCS: Mod: JG | Performed by: INTERNAL MEDICINE

## 2021-10-28 RX ADMIN — EPOETIN ALFA-EPBX 3000 UNITS: 3000 INJECTION, SOLUTION INTRAVENOUS; SUBCUTANEOUS at 01:10

## 2021-10-29 ENCOUNTER — TELEPHONE (OUTPATIENT)
Dept: FAMILY MEDICINE | Facility: CLINIC | Age: 86
End: 2021-10-29

## 2021-10-29 ENCOUNTER — OFFICE VISIT (OUTPATIENT)
Dept: FAMILY MEDICINE | Facility: CLINIC | Age: 86
End: 2021-10-29
Payer: MEDICARE

## 2021-10-29 VITALS
OXYGEN SATURATION: 95 % | SYSTOLIC BLOOD PRESSURE: 150 MMHG | BODY MASS INDEX: 22.89 KG/M2 | TEMPERATURE: 98 F | WEIGHT: 129.19 LBS | HEIGHT: 63 IN | RESPIRATION RATE: 18 BRPM | DIASTOLIC BLOOD PRESSURE: 50 MMHG | HEART RATE: 73 BPM

## 2021-10-29 DIAGNOSIS — E11.22 CONTROLLED TYPE 2 DIABETES MELLITUS WITH STAGE 4 CHRONIC KIDNEY DISEASE, WITHOUT LONG-TERM CURRENT USE OF INSULIN: ICD-10-CM

## 2021-10-29 DIAGNOSIS — D50.9 IRON DEFICIENCY ANEMIA, UNSPECIFIED IRON DEFICIENCY ANEMIA TYPE: ICD-10-CM

## 2021-10-29 DIAGNOSIS — L03.115 CELLULITIS OF RIGHT LOWER EXTREMITY: ICD-10-CM

## 2021-10-29 DIAGNOSIS — Z23 FLU VACCINE NEED: ICD-10-CM

## 2021-10-29 DIAGNOSIS — K31.819 GAVE (GASTRIC ANTRAL VASCULAR ECTASIA): ICD-10-CM

## 2021-10-29 DIAGNOSIS — E78.5 HYPERLIPIDEMIA ASSOCIATED WITH TYPE 2 DIABETES MELLITUS: ICD-10-CM

## 2021-10-29 DIAGNOSIS — E11.59 HYPERTENSION ASSOCIATED WITH DIABETES: ICD-10-CM

## 2021-10-29 DIAGNOSIS — N18.4 CONTROLLED TYPE 2 DIABETES MELLITUS WITH STAGE 4 CHRONIC KIDNEY DISEASE, WITHOUT LONG-TERM CURRENT USE OF INSULIN: ICD-10-CM

## 2021-10-29 DIAGNOSIS — I15.2 HYPERTENSION ASSOCIATED WITH DIABETES: ICD-10-CM

## 2021-10-29 DIAGNOSIS — I50.33 ACUTE ON CHRONIC DIASTOLIC HEART FAILURE: Primary | ICD-10-CM

## 2021-10-29 DIAGNOSIS — S72.001A CLOSED FRACTURE OF RIGHT HIP, INITIAL ENCOUNTER: ICD-10-CM

## 2021-10-29 DIAGNOSIS — E03.9 HYPOTHYROIDISM, UNSPECIFIED TYPE: ICD-10-CM

## 2021-10-29 DIAGNOSIS — J44.9 COPD MIXED TYPE: ICD-10-CM

## 2021-10-29 DIAGNOSIS — E11.69 HYPERLIPIDEMIA ASSOCIATED WITH TYPE 2 DIABETES MELLITUS: ICD-10-CM

## 2021-10-29 PROCEDURE — G0008 ADMIN INFLUENZA VIRUS VAC: HCPCS | Mod: S$GLB,,, | Performed by: FAMILY MEDICINE

## 2021-10-29 PROCEDURE — 1159F MED LIST DOCD IN RCRD: CPT | Mod: CPTII,S$GLB,, | Performed by: FAMILY MEDICINE

## 2021-10-29 PROCEDURE — 99214 OFFICE O/P EST MOD 30 MIN: CPT | Mod: S$GLB,,, | Performed by: FAMILY MEDICINE

## 2021-10-29 PROCEDURE — 1100F PTFALLS ASSESS-DOCD GE2>/YR: CPT | Mod: CPTII,S$GLB,, | Performed by: FAMILY MEDICINE

## 2021-10-29 PROCEDURE — 99999 PR PBB SHADOW E&M-EST. PATIENT-LVL IV: ICD-10-PCS | Mod: PBBFAC,,, | Performed by: FAMILY MEDICINE

## 2021-10-29 PROCEDURE — 3288F FALL RISK ASSESSMENT DOCD: CPT | Mod: CPTII,S$GLB,, | Performed by: FAMILY MEDICINE

## 2021-10-29 PROCEDURE — 90694 FLU VACCINE - QUADRIVALENT - ADJUVANTED: ICD-10-PCS | Mod: S$GLB,,, | Performed by: FAMILY MEDICINE

## 2021-10-29 PROCEDURE — 99999 PR PBB SHADOW E&M-EST. PATIENT-LVL IV: CPT | Mod: PBBFAC,,, | Performed by: FAMILY MEDICINE

## 2021-10-29 PROCEDURE — 1160F RVW MEDS BY RX/DR IN RCRD: CPT | Mod: CPTII,S$GLB,, | Performed by: FAMILY MEDICINE

## 2021-10-29 PROCEDURE — 1125F PR PAIN SEVERITY QUANTIFIED, PAIN PRESENT: ICD-10-PCS | Mod: CPTII,S$GLB,, | Performed by: FAMILY MEDICINE

## 2021-10-29 PROCEDURE — 90694 VACC AIIV4 NO PRSRV 0.5ML IM: CPT | Mod: S$GLB,,, | Performed by: FAMILY MEDICINE

## 2021-10-29 PROCEDURE — 1160F PR REVIEW ALL MEDS BY PRESCRIBER/CLIN PHARMACIST DOCUMENTED: ICD-10-PCS | Mod: CPTII,S$GLB,, | Performed by: FAMILY MEDICINE

## 2021-10-29 PROCEDURE — 1100F PR PT FALLS ASSESS DOC 2+ FALLS/FALL W/INJURY/YR: ICD-10-PCS | Mod: CPTII,S$GLB,, | Performed by: FAMILY MEDICINE

## 2021-10-29 PROCEDURE — 1157F ADVNC CARE PLAN IN RCRD: CPT | Mod: CPTII,S$GLB,, | Performed by: FAMILY MEDICINE

## 2021-10-29 PROCEDURE — G0008 FLU VACCINE - QUADRIVALENT - ADJUVANTED: ICD-10-PCS | Mod: S$GLB,,, | Performed by: FAMILY MEDICINE

## 2021-10-29 PROCEDURE — 3288F PR FALLS RISK ASSESSMENT DOCUMENTED: ICD-10-PCS | Mod: CPTII,S$GLB,, | Performed by: FAMILY MEDICINE

## 2021-10-29 PROCEDURE — 1125F AMNT PAIN NOTED PAIN PRSNT: CPT | Mod: CPTII,S$GLB,, | Performed by: FAMILY MEDICINE

## 2021-10-29 PROCEDURE — 99214 PR OFFICE/OUTPT VISIT, EST, LEVL IV, 30-39 MIN: ICD-10-PCS | Mod: S$GLB,,, | Performed by: FAMILY MEDICINE

## 2021-10-29 PROCEDURE — 1159F PR MEDICATION LIST DOCUMENTED IN MEDICAL RECORD: ICD-10-PCS | Mod: CPTII,S$GLB,, | Performed by: FAMILY MEDICINE

## 2021-10-29 PROCEDURE — 1157F PR ADVANCE CARE PLAN OR EQUIV PRESENT IN MEDICAL RECORD: ICD-10-PCS | Mod: CPTII,S$GLB,, | Performed by: FAMILY MEDICINE

## 2021-11-02 ENCOUNTER — TELEPHONE (OUTPATIENT)
Dept: FAMILY MEDICINE | Facility: CLINIC | Age: 86
End: 2021-11-02
Payer: MEDICARE

## 2021-11-03 RX ORDER — DILTIAZEM HYDROCHLORIDE 180 MG/1
180 CAPSULE, COATED, EXTENDED RELEASE ORAL DAILY
COMMUNITY
End: 2021-11-03 | Stop reason: SDUPTHER

## 2021-11-04 ENCOUNTER — TELEPHONE (OUTPATIENT)
Dept: FAMILY MEDICINE | Facility: CLINIC | Age: 86
End: 2021-11-04
Payer: MEDICARE

## 2021-11-04 RX ORDER — DILTIAZEM HYDROCHLORIDE 180 MG/1
180 CAPSULE, COATED, EXTENDED RELEASE ORAL DAILY
Qty: 90 CAPSULE | Refills: 3 | Status: SHIPPED | OUTPATIENT
Start: 2021-11-04 | End: 2021-01-01

## 2021-12-08 NOTE — TELEPHONE ENCOUNTER
----- Message from Drea Gonzales LPN sent at 12/7/2021  5:18 PM CST -----  Contact: Pt,[phone]    ----- Message -----  From: Harjit Navarro  Sent: 12/7/2021   4:44 PM CST  To: [facility] Staff    Type:  Needs Medical Advice    Who Called: Natasha mcnally/ [facility]  Symptoms (please be specific): bp 170/70 and she having neck and bone pain   How long has patient had these symptoms:  1 week   Pharmacy name and phone #:  [facility] [phone]  Would the patient rather a call back or a response via [facility]? Call back   Best Call Back Number: [phone]  Additional Information: None

## 2022-01-01 ENCOUNTER — INFUSION (OUTPATIENT)
Dept: INFUSION THERAPY | Facility: HOSPITAL | Age: 87
End: 2022-01-01
Attending: INTERNAL MEDICINE
Payer: MEDICARE

## 2022-01-01 ENCOUNTER — LAB VISIT (OUTPATIENT)
Dept: LAB | Facility: HOSPITAL | Age: 87
End: 2022-01-01
Attending: INTERNAL MEDICINE
Payer: MEDICARE

## 2022-01-01 ENCOUNTER — OFFICE VISIT (OUTPATIENT)
Dept: FAMILY MEDICINE | Facility: CLINIC | Age: 87
End: 2022-01-01
Payer: MEDICARE

## 2022-01-01 ENCOUNTER — PATIENT MESSAGE (OUTPATIENT)
Dept: PULMONOLOGY | Facility: CLINIC | Age: 87
End: 2022-01-01
Payer: MEDICARE

## 2022-01-01 ENCOUNTER — PATIENT MESSAGE (OUTPATIENT)
Dept: FAMILY MEDICINE | Facility: CLINIC | Age: 87
End: 2022-01-01
Payer: MEDICARE

## 2022-01-01 ENCOUNTER — TELEPHONE (OUTPATIENT)
Dept: OBSTETRICS AND GYNECOLOGY | Facility: CLINIC | Age: 87
End: 2022-01-01
Payer: MEDICARE

## 2022-01-01 ENCOUNTER — TELEPHONE (OUTPATIENT)
Dept: FAMILY MEDICINE | Facility: CLINIC | Age: 87
End: 2022-01-01
Payer: MEDICARE

## 2022-01-01 ENCOUNTER — HOSPITAL ENCOUNTER (INPATIENT)
Facility: HOSPITAL | Age: 87
LOS: 10 days | Discharge: HOSPICE | DRG: 193 | End: 2022-10-28
Attending: EMERGENCY MEDICINE | Admitting: INTERNAL MEDICINE
Payer: MEDICARE

## 2022-01-01 ENCOUNTER — OFFICE VISIT (OUTPATIENT)
Dept: PHYSICAL MEDICINE AND REHAB | Facility: CLINIC | Age: 87
End: 2022-01-01
Payer: MEDICARE

## 2022-01-01 ENCOUNTER — HOSPITAL ENCOUNTER (OUTPATIENT)
Facility: HOSPITAL | Age: 87
Discharge: HOME | End: 2022-03-27
Attending: EMERGENCY MEDICINE | Admitting: INTERNAL MEDICINE
Payer: MEDICARE

## 2022-01-01 ENCOUNTER — TELEPHONE (OUTPATIENT)
Dept: CARDIOLOGY | Facility: CLINIC | Age: 87
End: 2022-01-01
Payer: MEDICARE

## 2022-01-01 ENCOUNTER — HOSPITAL ENCOUNTER (INPATIENT)
Facility: HOSPITAL | Age: 87
LOS: 3 days | Discharge: HOME | DRG: 312 | End: 2022-03-24
Attending: EMERGENCY MEDICINE | Admitting: INTERNAL MEDICINE
Payer: MEDICARE

## 2022-01-01 ENCOUNTER — SPECIALTY PHARMACY (OUTPATIENT)
Dept: PHARMACY | Facility: CLINIC | Age: 87
End: 2022-01-01
Payer: MEDICARE

## 2022-01-01 ENCOUNTER — TELEPHONE (OUTPATIENT)
Dept: INFUSION THERAPY | Facility: HOSPITAL | Age: 87
End: 2022-01-01

## 2022-01-01 ENCOUNTER — HOSPITAL ENCOUNTER (EMERGENCY)
Facility: HOSPITAL | Age: 87
Discharge: HOME | End: 2022-01-11
Attending: EMERGENCY MEDICINE
Payer: MEDICARE

## 2022-01-01 ENCOUNTER — PATIENT MESSAGE (OUTPATIENT)
Dept: PHARMACY | Facility: CLINIC | Age: 87
End: 2022-01-01
Payer: MEDICARE

## 2022-01-01 ENCOUNTER — TELEPHONE (OUTPATIENT)
Dept: HEMATOLOGY/ONCOLOGY | Facility: CLINIC | Age: 87
End: 2022-01-01
Payer: MEDICARE

## 2022-01-01 ENCOUNTER — TELEPHONE (OUTPATIENT)
Dept: PULMONOLOGY | Facility: CLINIC | Age: 87
End: 2022-01-01
Payer: MEDICARE

## 2022-01-01 ENCOUNTER — HOSPITAL ENCOUNTER (OUTPATIENT)
Dept: RADIOLOGY | Facility: HOSPITAL | Age: 87
Discharge: HOME | End: 2022-02-15
Attending: INTERNAL MEDICINE
Payer: MEDICARE

## 2022-01-01 ENCOUNTER — HOSPITAL ENCOUNTER (OUTPATIENT)
Dept: CARDIOLOGY | Facility: CLINIC | Age: 87
Discharge: HOME | End: 2022-07-12
Payer: MEDICARE

## 2022-01-01 ENCOUNTER — DOCUMENT SCAN (OUTPATIENT)
Dept: HOME HEALTH SERVICES | Facility: HOSPITAL | Age: 87
End: 2022-01-01
Payer: MEDICARE

## 2022-01-01 ENCOUNTER — OFFICE VISIT (OUTPATIENT)
Dept: PULMONOLOGY | Facility: CLINIC | Age: 87
End: 2022-01-01
Payer: MEDICARE

## 2022-01-01 ENCOUNTER — OFFICE VISIT (OUTPATIENT)
Dept: CARDIOLOGY | Facility: CLINIC | Age: 87
End: 2022-01-01
Payer: MEDICARE

## 2022-01-01 ENCOUNTER — PATIENT OUTREACH (OUTPATIENT)
Dept: ADMINISTRATIVE | Facility: OTHER | Age: 87
End: 2022-01-01
Payer: MEDICARE

## 2022-01-01 ENCOUNTER — HOSPITAL ENCOUNTER (INPATIENT)
Facility: HOSPITAL | Age: 87
LOS: 1 days | Discharge: HOME HEALTH | DRG: 683 | End: 2022-07-23
Attending: EMERGENCY MEDICINE | Admitting: INTERNAL MEDICINE
Payer: MEDICARE

## 2022-01-01 ENCOUNTER — HOSPITAL ENCOUNTER (OUTPATIENT)
Dept: PULMONOLOGY | Facility: HOSPITAL | Age: 87
Discharge: HOME | End: 2022-05-18
Attending: INTERNAL MEDICINE
Payer: MEDICARE

## 2022-01-01 ENCOUNTER — OFFICE VISIT (OUTPATIENT)
Dept: PODIATRY | Facility: CLINIC | Age: 87
End: 2022-01-01
Payer: MEDICARE

## 2022-01-01 ENCOUNTER — PATIENT MESSAGE (OUTPATIENT)
Dept: FAMILY MEDICINE | Facility: CLINIC | Age: 87
End: 2022-01-01

## 2022-01-01 ENCOUNTER — OFFICE VISIT (OUTPATIENT)
Dept: OBSTETRICS AND GYNECOLOGY | Facility: CLINIC | Age: 87
End: 2022-01-01
Payer: MEDICARE

## 2022-01-01 ENCOUNTER — CLINICAL SUPPORT (OUTPATIENT)
Dept: CARDIOLOGY | Facility: HOSPITAL | Age: 87
DRG: 312 | End: 2022-01-01
Attending: INTERNAL MEDICINE
Payer: MEDICARE

## 2022-01-01 ENCOUNTER — OFFICE VISIT (OUTPATIENT)
Dept: HEMATOLOGY/ONCOLOGY | Facility: CLINIC | Age: 87
End: 2022-01-01
Payer: MEDICARE

## 2022-01-01 ENCOUNTER — CLINICAL SUPPORT (OUTPATIENT)
Dept: CARDIOLOGY | Facility: HOSPITAL | Age: 87
DRG: 193 | End: 2022-01-01
Attending: INTERNAL MEDICINE
Payer: MEDICARE

## 2022-01-01 ENCOUNTER — EXTERNAL HOME HEALTH (OUTPATIENT)
Dept: HOME HEALTH SERVICES | Facility: HOSPITAL | Age: 87
End: 2022-01-01
Payer: MEDICARE

## 2022-01-01 ENCOUNTER — HOSPITAL ENCOUNTER (OUTPATIENT)
Facility: HOSPITAL | Age: 87
Discharge: HOME HEALTH | End: 2022-01-14
Attending: EMERGENCY MEDICINE | Admitting: INTERNAL MEDICINE
Payer: MEDICARE

## 2022-01-01 ENCOUNTER — LAB VISIT (OUTPATIENT)
Dept: LAB | Facility: HOSPITAL | Age: 87
End: 2022-01-01
Attending: FAMILY MEDICINE
Payer: MEDICARE

## 2022-01-01 ENCOUNTER — PES CALL (OUTPATIENT)
Dept: ADMINISTRATIVE | Facility: CLINIC | Age: 87
End: 2022-01-01
Payer: MEDICARE

## 2022-01-01 ENCOUNTER — TELEPHONE (OUTPATIENT)
Dept: INFUSION THERAPY | Facility: HOSPITAL | Age: 87
End: 2022-01-01
Payer: MEDICARE

## 2022-01-01 ENCOUNTER — OFFICE VISIT (OUTPATIENT)
Dept: URGENT CARE | Facility: CLINIC | Age: 87
End: 2022-01-01
Payer: MEDICARE

## 2022-01-01 ENCOUNTER — OFFICE VISIT (OUTPATIENT)
Dept: OPTOMETRY | Facility: CLINIC | Age: 87
End: 2022-01-01
Payer: MEDICARE

## 2022-01-01 ENCOUNTER — HOSPITAL ENCOUNTER (OUTPATIENT)
Dept: CARDIOLOGY | Facility: CLINIC | Age: 87
Discharge: HOME | End: 2022-10-04
Payer: MEDICARE

## 2022-01-01 ENCOUNTER — TELEPHONE (OUTPATIENT)
Dept: ORTHOPEDICS | Facility: CLINIC | Age: 87
End: 2022-01-01
Payer: MEDICARE

## 2022-01-01 VITALS
BODY MASS INDEX: 22 KG/M2 | OXYGEN SATURATION: 96 % | SYSTOLIC BLOOD PRESSURE: 111 MMHG | WEIGHT: 124.19 LBS | TEMPERATURE: 98 F | DIASTOLIC BLOOD PRESSURE: 52 MMHG | RESPIRATION RATE: 18 BRPM | HEART RATE: 63 BPM

## 2022-01-01 VITALS
RESPIRATION RATE: 17 BRPM | HEIGHT: 63 IN | WEIGHT: 122 LBS | TEMPERATURE: 97 F | DIASTOLIC BLOOD PRESSURE: 60 MMHG | OXYGEN SATURATION: 95 % | SYSTOLIC BLOOD PRESSURE: 129 MMHG | BODY MASS INDEX: 21.62 KG/M2 | HEART RATE: 73 BPM

## 2022-01-01 VITALS
BODY MASS INDEX: 21.26 KG/M2 | DIASTOLIC BLOOD PRESSURE: 91 MMHG | OXYGEN SATURATION: 100 % | DIASTOLIC BLOOD PRESSURE: 63 MMHG | TEMPERATURE: 97 F | SYSTOLIC BLOOD PRESSURE: 185 MMHG | OXYGEN SATURATION: 97 % | WEIGHT: 128 LBS | TEMPERATURE: 98 F | HEART RATE: 76 BPM | HEIGHT: 60 IN | RESPIRATION RATE: 18 BRPM | RESPIRATION RATE: 19 BRPM | WEIGHT: 120 LBS | HEART RATE: 66 BPM | BODY MASS INDEX: 25.13 KG/M2 | HEIGHT: 63 IN | SYSTOLIC BLOOD PRESSURE: 143 MMHG

## 2022-01-01 VITALS
WEIGHT: 123 LBS | RESPIRATION RATE: 14 BRPM | SYSTOLIC BLOOD PRESSURE: 160 MMHG | WEIGHT: 123 LBS | HEIGHT: 63 IN | DIASTOLIC BLOOD PRESSURE: 70 MMHG | HEART RATE: 84 BPM | TEMPERATURE: 98 F | WEIGHT: 121.25 LBS | OXYGEN SATURATION: 98 % | RESPIRATION RATE: 16 BRPM | BODY MASS INDEX: 21.48 KG/M2 | OXYGEN SATURATION: 95 % | BODY MASS INDEX: 21.79 KG/M2 | HEIGHT: 63 IN | HEIGHT: 63 IN | TEMPERATURE: 98 F | BODY MASS INDEX: 21.79 KG/M2 | SYSTOLIC BLOOD PRESSURE: 170 MMHG | HEART RATE: 75 BPM | DIASTOLIC BLOOD PRESSURE: 60 MMHG

## 2022-01-01 VITALS
SYSTOLIC BLOOD PRESSURE: 150 MMHG | WEIGHT: 129 LBS | BODY MASS INDEX: 22.86 KG/M2 | DIASTOLIC BLOOD PRESSURE: 64 MMHG | BODY MASS INDEX: 22.46 KG/M2 | WEIGHT: 126.75 LBS | DIASTOLIC BLOOD PRESSURE: 64 MMHG | SYSTOLIC BLOOD PRESSURE: 160 MMHG | OXYGEN SATURATION: 96 % | HEART RATE: 78 BPM | HEIGHT: 63 IN

## 2022-01-01 VITALS
RESPIRATION RATE: 16 BRPM | BODY MASS INDEX: 22.15 KG/M2 | OXYGEN SATURATION: 97 % | SYSTOLIC BLOOD PRESSURE: 179 MMHG | HEART RATE: 62 BPM | WEIGHT: 125 LBS | HEIGHT: 63 IN | DIASTOLIC BLOOD PRESSURE: 66 MMHG | TEMPERATURE: 97 F

## 2022-01-01 VITALS
SYSTOLIC BLOOD PRESSURE: 179 MMHG | BODY MASS INDEX: 21.44 KG/M2 | OXYGEN SATURATION: 95 % | RESPIRATION RATE: 17 BRPM | SYSTOLIC BLOOD PRESSURE: 165 MMHG | WEIGHT: 121 LBS | DIASTOLIC BLOOD PRESSURE: 68 MMHG | HEIGHT: 63 IN | HEIGHT: 63 IN | TEMPERATURE: 97 F | RESPIRATION RATE: 18 BRPM | HEART RATE: 78 BPM | BODY MASS INDEX: 21.3 KG/M2 | TEMPERATURE: 98 F | HEART RATE: 78 BPM | DIASTOLIC BLOOD PRESSURE: 63 MMHG | OXYGEN SATURATION: 97 % | WEIGHT: 120.19 LBS

## 2022-01-01 VITALS
WEIGHT: 128 LBS | RESPIRATION RATE: 17 BRPM | BODY MASS INDEX: 22.68 KG/M2 | HEART RATE: 85 BPM | SYSTOLIC BLOOD PRESSURE: 178 MMHG | TEMPERATURE: 97 F | HEIGHT: 63 IN | DIASTOLIC BLOOD PRESSURE: 53 MMHG

## 2022-01-01 VITALS
BODY MASS INDEX: 21.97 KG/M2 | HEIGHT: 63 IN | SYSTOLIC BLOOD PRESSURE: 165 MMHG | WEIGHT: 124 LBS | OXYGEN SATURATION: 96 % | RESPIRATION RATE: 18 BRPM | HEART RATE: 74 BPM | DIASTOLIC BLOOD PRESSURE: 59 MMHG | TEMPERATURE: 97 F

## 2022-01-01 VITALS
HEART RATE: 80 BPM | TEMPERATURE: 98 F | HEIGHT: 63 IN | WEIGHT: 128 LBS | SYSTOLIC BLOOD PRESSURE: 172 MMHG | RESPIRATION RATE: 32 BRPM | BODY MASS INDEX: 22.68 KG/M2 | OXYGEN SATURATION: 94 % | DIASTOLIC BLOOD PRESSURE: 75 MMHG

## 2022-01-01 VITALS
TEMPERATURE: 98 F | DIASTOLIC BLOOD PRESSURE: 55 MMHG | RESPIRATION RATE: 18 BRPM | OXYGEN SATURATION: 98 % | SYSTOLIC BLOOD PRESSURE: 148 MMHG | HEART RATE: 82 BPM | WEIGHT: 127.13 LBS | BODY MASS INDEX: 22.51 KG/M2

## 2022-01-01 VITALS
HEIGHT: 63 IN | OXYGEN SATURATION: 95 % | WEIGHT: 128.31 LBS | HEART RATE: 70 BPM | RESPIRATION RATE: 18 BRPM | DIASTOLIC BLOOD PRESSURE: 60 MMHG | TEMPERATURE: 98 F | SYSTOLIC BLOOD PRESSURE: 134 MMHG | BODY MASS INDEX: 22.73 KG/M2

## 2022-01-01 VITALS
WEIGHT: 122 LBS | SYSTOLIC BLOOD PRESSURE: 167 MMHG | DIASTOLIC BLOOD PRESSURE: 56 MMHG | HEIGHT: 63 IN | SYSTOLIC BLOOD PRESSURE: 171 MMHG | TEMPERATURE: 97 F | HEART RATE: 75 BPM | WEIGHT: 123.38 LBS | BODY MASS INDEX: 21.86 KG/M2 | OXYGEN SATURATION: 95 % | RESPIRATION RATE: 18 BRPM | HEART RATE: 78 BPM | WEIGHT: 123 LBS | BODY MASS INDEX: 21.62 KG/M2 | DIASTOLIC BLOOD PRESSURE: 56 MMHG | RESPIRATION RATE: 18 BRPM | TEMPERATURE: 97 F | WEIGHT: 122.38 LBS | HEART RATE: 85 BPM | HEIGHT: 63 IN | HEIGHT: 63 IN | BODY MASS INDEX: 21.79 KG/M2 | SYSTOLIC BLOOD PRESSURE: 180 MMHG | BODY MASS INDEX: 21.68 KG/M2 | DIASTOLIC BLOOD PRESSURE: 63 MMHG | TEMPERATURE: 98 F

## 2022-01-01 VITALS
HEIGHT: 63 IN | OXYGEN SATURATION: 98 % | TEMPERATURE: 98 F | HEART RATE: 57 BPM | RESPIRATION RATE: 18 BRPM | DIASTOLIC BLOOD PRESSURE: 63 MMHG | HEIGHT: 63 IN | WEIGHT: 114 LBS | BODY MASS INDEX: 23.09 KG/M2 | WEIGHT: 130.31 LBS | SYSTOLIC BLOOD PRESSURE: 148 MMHG | BODY MASS INDEX: 20.2 KG/M2

## 2022-01-01 VITALS
OXYGEN SATURATION: 98 % | WEIGHT: 121 LBS | SYSTOLIC BLOOD PRESSURE: 142 MMHG | HEIGHT: 63 IN | RESPIRATION RATE: 18 BRPM | HEART RATE: 61 BPM | DIASTOLIC BLOOD PRESSURE: 57 MMHG | TEMPERATURE: 97 F | BODY MASS INDEX: 21.44 KG/M2

## 2022-01-01 VITALS
WEIGHT: 131.38 LBS | BODY MASS INDEX: 23.28 KG/M2 | SYSTOLIC BLOOD PRESSURE: 121 MMHG | HEIGHT: 63 IN | DIASTOLIC BLOOD PRESSURE: 79 MMHG | OXYGEN SATURATION: 98 % | HEART RATE: 71 BPM | RESPIRATION RATE: 16 BRPM

## 2022-01-01 VITALS
HEART RATE: 74 BPM | WEIGHT: 124 LBS | BODY MASS INDEX: 21.97 KG/M2 | SYSTOLIC BLOOD PRESSURE: 155 MMHG | DIASTOLIC BLOOD PRESSURE: 50 MMHG | SYSTOLIC BLOOD PRESSURE: 188 MMHG | HEART RATE: 61 BPM | TEMPERATURE: 98 F | HEIGHT: 63 IN | RESPIRATION RATE: 16 BRPM | TEMPERATURE: 98 F | BODY MASS INDEX: 22.68 KG/M2 | DIASTOLIC BLOOD PRESSURE: 66 MMHG | WEIGHT: 128 LBS | RESPIRATION RATE: 18 BRPM | HEIGHT: 63 IN

## 2022-01-01 VITALS
OXYGEN SATURATION: 94 % | WEIGHT: 125.63 LBS | RESPIRATION RATE: 18 BRPM | HEIGHT: 63 IN | HEART RATE: 87 BPM | SYSTOLIC BLOOD PRESSURE: 152 MMHG | BODY MASS INDEX: 22.26 KG/M2 | TEMPERATURE: 98 F | DIASTOLIC BLOOD PRESSURE: 75 MMHG

## 2022-01-01 VITALS
HEIGHT: 63 IN | TEMPERATURE: 98 F | DIASTOLIC BLOOD PRESSURE: 62 MMHG | BODY MASS INDEX: 21.67 KG/M2 | SYSTOLIC BLOOD PRESSURE: 158 MMHG | WEIGHT: 122.31 LBS | OXYGEN SATURATION: 97 % | HEART RATE: 77 BPM | RESPIRATION RATE: 18 BRPM

## 2022-01-01 VITALS
OXYGEN SATURATION: 96 % | RESPIRATION RATE: 18 BRPM | BODY MASS INDEX: 21.48 KG/M2 | TEMPERATURE: 98 F | SYSTOLIC BLOOD PRESSURE: 136 MMHG | DIASTOLIC BLOOD PRESSURE: 61 MMHG | HEIGHT: 63 IN | HEART RATE: 56 BPM | WEIGHT: 121.25 LBS

## 2022-01-01 VITALS
DIASTOLIC BLOOD PRESSURE: 67 MMHG | SYSTOLIC BLOOD PRESSURE: 140 MMHG | HEIGHT: 63 IN | BODY MASS INDEX: 21.26 KG/M2 | HEIGHT: 63 IN | HEART RATE: 71 BPM | WEIGHT: 121.13 LBS | WEIGHT: 120 LBS | BODY MASS INDEX: 21.46 KG/M2 | RESPIRATION RATE: 16 BRPM | HEART RATE: 72 BPM | OXYGEN SATURATION: 98 % | SYSTOLIC BLOOD PRESSURE: 162 MMHG | DIASTOLIC BLOOD PRESSURE: 60 MMHG

## 2022-01-01 VITALS
WEIGHT: 125.38 LBS | DIASTOLIC BLOOD PRESSURE: 53 MMHG | HEART RATE: 79 BPM | BODY MASS INDEX: 22.21 KG/M2 | SYSTOLIC BLOOD PRESSURE: 146 MMHG | RESPIRATION RATE: 18 BRPM | OXYGEN SATURATION: 93 % | TEMPERATURE: 98 F

## 2022-01-01 VITALS
BODY MASS INDEX: 22.27 KG/M2 | OXYGEN SATURATION: 94 % | RESPIRATION RATE: 18 BRPM | WEIGHT: 125.69 LBS | DIASTOLIC BLOOD PRESSURE: 64 MMHG | SYSTOLIC BLOOD PRESSURE: 118 MMHG | HEART RATE: 78 BPM | HEIGHT: 63 IN | TEMPERATURE: 98 F

## 2022-01-01 VITALS — HEIGHT: 63 IN | WEIGHT: 124 LBS | BODY MASS INDEX: 21.97 KG/M2

## 2022-01-01 DIAGNOSIS — R10.2 VAGINAL PAIN: ICD-10-CM

## 2022-01-01 DIAGNOSIS — E03.9 HYPOTHYROIDISM, UNSPECIFIED TYPE: ICD-10-CM

## 2022-01-01 DIAGNOSIS — M71.21 BAKER CYST, RIGHT: ICD-10-CM

## 2022-01-01 DIAGNOSIS — N18.30 ANEMIA DUE TO STAGE 3 CHRONIC KIDNEY DISEASE TREATED WITH ERYTHROPOIETIN: ICD-10-CM

## 2022-01-01 DIAGNOSIS — D64.9 NORMOCYTIC NORMOCHROMIC ANEMIA: Primary | ICD-10-CM

## 2022-01-01 DIAGNOSIS — J44.9 CHRONIC OBSTRUCTIVE PULMONARY DISEASE, UNSPECIFIED COPD TYPE: Primary | ICD-10-CM

## 2022-01-01 DIAGNOSIS — N18.30 STAGE 3 CHRONIC KIDNEY DISEASE, UNSPECIFIED WHETHER STAGE 3A OR 3B CKD: ICD-10-CM

## 2022-01-01 DIAGNOSIS — N18.30 CHRONIC KIDNEY DISEASE, STAGE III (MODERATE): ICD-10-CM

## 2022-01-01 DIAGNOSIS — J44.1 COPD EXACERBATION: ICD-10-CM

## 2022-01-01 DIAGNOSIS — D63.1 ANEMIA DUE TO STAGE 3 CHRONIC KIDNEY DISEASE TREATED WITH ERYTHROPOIETIN: ICD-10-CM

## 2022-01-01 DIAGNOSIS — N18.4 TYPE 2 DIABETES MELLITUS WITH STAGE 4 CHRONIC KIDNEY DISEASE, WITHOUT LONG-TERM CURRENT USE OF INSULIN: ICD-10-CM

## 2022-01-01 DIAGNOSIS — N39.0 URINARY TRACT INFECTION WITHOUT HEMATURIA, SITE UNSPECIFIED: ICD-10-CM

## 2022-01-01 DIAGNOSIS — E11.59 HYPERTENSION ASSOCIATED WITH DIABETES: ICD-10-CM

## 2022-01-01 DIAGNOSIS — R79.83 HOMOCYSTEINEMIA: ICD-10-CM

## 2022-01-01 DIAGNOSIS — M79.604 RIGHT LEG PAIN: ICD-10-CM

## 2022-01-01 DIAGNOSIS — R05.9 COUGH: ICD-10-CM

## 2022-01-01 DIAGNOSIS — D64.9 CHRONIC ANEMIA: ICD-10-CM

## 2022-01-01 DIAGNOSIS — Z86.718 PERSONAL HISTORY OF THROMBOEMBOLIC DISEASE: Chronic | ICD-10-CM

## 2022-01-01 DIAGNOSIS — J44.89 ASTHMA-COPD OVERLAP SYNDROME: ICD-10-CM

## 2022-01-01 DIAGNOSIS — D63.8 ANEMIA OF CHRONIC DISEASE: ICD-10-CM

## 2022-01-01 DIAGNOSIS — E03.4 HYPOTHYROIDISM DUE TO ACQUIRED ATROPHY OF THYROID: Chronic | ICD-10-CM

## 2022-01-01 DIAGNOSIS — D64.9 ANEMIA, UNSPECIFIED: ICD-10-CM

## 2022-01-01 DIAGNOSIS — B37.31 VAGINA, CANDIDIASIS: Primary | ICD-10-CM

## 2022-01-01 DIAGNOSIS — D50.0 IRON DEFICIENCY ANEMIA DUE TO CHRONIC BLOOD LOSS: ICD-10-CM

## 2022-01-01 DIAGNOSIS — D64.89 ANEMIA DUE TO MULTIPLE MECHANISMS: ICD-10-CM

## 2022-01-01 DIAGNOSIS — J82.83 EOSINOPHILIC ASTHMA: ICD-10-CM

## 2022-01-01 DIAGNOSIS — I15.2 HYPERTENSION ASSOCIATED WITH DIABETES: ICD-10-CM

## 2022-01-01 DIAGNOSIS — J96.01 ACUTE RESPIRATORY FAILURE WITH HYPOXIA: ICD-10-CM

## 2022-01-01 DIAGNOSIS — D63.1 ANEMIA, CHRONIC RENAL FAILURE, STAGE 3 (MODERATE): ICD-10-CM

## 2022-01-01 DIAGNOSIS — E87.5 HYPERKALEMIA: ICD-10-CM

## 2022-01-01 DIAGNOSIS — D31.32 CHOROIDAL NEVUS OF LEFT EYE: ICD-10-CM

## 2022-01-01 DIAGNOSIS — I51.89 LEFT VENTRICULAR DIASTOLIC DYSFUNCTION WITH PRESERVED SYSTOLIC FUNCTION: ICD-10-CM

## 2022-01-01 DIAGNOSIS — I50.32 DIASTOLIC CHF, CHRONIC: ICD-10-CM

## 2022-01-01 DIAGNOSIS — R09.89 RIGHT CAROTID BRUIT: ICD-10-CM

## 2022-01-01 DIAGNOSIS — D64.9 ANEMIA, UNSPECIFIED: Primary | ICD-10-CM

## 2022-01-01 DIAGNOSIS — R79.89 ELEVATED TROPONIN: ICD-10-CM

## 2022-01-01 DIAGNOSIS — D50.9 IRON DEFICIENCY ANEMIA, UNSPECIFIED IRON DEFICIENCY ANEMIA TYPE: ICD-10-CM

## 2022-01-01 DIAGNOSIS — E78.5 HYPERLIPIDEMIA ASSOCIATED WITH TYPE 2 DIABETES MELLITUS: ICD-10-CM

## 2022-01-01 DIAGNOSIS — J40 BRONCHITIS: Primary | ICD-10-CM

## 2022-01-01 DIAGNOSIS — D63.1 ERYTHROPOIETIN DEFICIENCY ANEMIA: ICD-10-CM

## 2022-01-01 DIAGNOSIS — R07.9 CHEST PAIN: ICD-10-CM

## 2022-01-01 DIAGNOSIS — J18.9 PNEUMONIA DUE TO INFECTIOUS ORGANISM, UNSPECIFIED LATERALITY, UNSPECIFIED PART OF LUNG: ICD-10-CM

## 2022-01-01 DIAGNOSIS — D53.9 MACROCYTIC ANEMIA: ICD-10-CM

## 2022-01-01 DIAGNOSIS — I50.9 ACUTE ON CHRONIC CONGESTIVE HEART FAILURE, UNSPECIFIED HEART FAILURE TYPE: ICD-10-CM

## 2022-01-01 DIAGNOSIS — M25.561 CHRONIC PAIN OF RIGHT KNEE: Primary | ICD-10-CM

## 2022-01-01 DIAGNOSIS — R55 SYNCOPE: ICD-10-CM

## 2022-01-01 DIAGNOSIS — N18.30 ANEMIA DUE TO STAGE 3 CHRONIC KIDNEY DISEASE TREATED WITH ERYTHROPOIETIN: Primary | ICD-10-CM

## 2022-01-01 DIAGNOSIS — I73.9 PAD (PERIPHERAL ARTERY DISEASE): ICD-10-CM

## 2022-01-01 DIAGNOSIS — J44.1 COPD EXACERBATION: Primary | ICD-10-CM

## 2022-01-01 DIAGNOSIS — D64.9 NORMOCYTIC NORMOCHROMIC ANEMIA: ICD-10-CM

## 2022-01-01 DIAGNOSIS — R06.02 SHORTNESS OF BREATH: ICD-10-CM

## 2022-01-01 DIAGNOSIS — M71.21 BAKER CYST, RIGHT: Primary | ICD-10-CM

## 2022-01-01 DIAGNOSIS — M71.21 POPLITEAL CYST, RIGHT: ICD-10-CM

## 2022-01-01 DIAGNOSIS — I10 HYPERTENSION, UNSPECIFIED TYPE: ICD-10-CM
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
## 2022-01-01 DIAGNOSIS — M17.11 PRIMARY OSTEOARTHRITIS OF RIGHT KNEE: ICD-10-CM

## 2022-01-01 DIAGNOSIS — E11.22 TYPE 2 DIABETES MELLITUS WITH STAGE 4 CHRONIC KIDNEY DISEASE, WITHOUT LONG-TERM CURRENT USE OF INSULIN: ICD-10-CM

## 2022-01-01 DIAGNOSIS — R09.02 HYPOXIA: ICD-10-CM

## 2022-01-01 DIAGNOSIS — Z86.711 HISTORY OF PULMONARY EMBOLISM: ICD-10-CM

## 2022-01-01 DIAGNOSIS — N18.4 CKD (CHRONIC KIDNEY DISEASE), STAGE IV: ICD-10-CM

## 2022-01-01 DIAGNOSIS — E11.59 HYPERTENSION ASSOCIATED WITH DIABETES: Primary | ICD-10-CM

## 2022-01-01 DIAGNOSIS — N18.30 CHRONIC KIDNEY DISEASE, STAGE III (MODERATE): Primary | ICD-10-CM

## 2022-01-01 DIAGNOSIS — R79.89 PRERENAL AZOTEMIA: ICD-10-CM

## 2022-01-01 DIAGNOSIS — N18.4 TYPE 2 DIABETES MELLITUS WITH STAGE 4 CHRONIC KIDNEY DISEASE, WITHOUT LONG-TERM CURRENT USE OF INSULIN: Primary | ICD-10-CM

## 2022-01-01 DIAGNOSIS — D63.1 ANEMIA DUE TO STAGE 3 CHRONIC KIDNEY DISEASE TREATED WITH ERYTHROPOIETIN: Primary | ICD-10-CM

## 2022-01-01 DIAGNOSIS — J45.902 CHRONIC OBSTRUCTIVE ASTHMA WITH STATUS ASTHMATICUS: Primary | ICD-10-CM

## 2022-01-01 DIAGNOSIS — J44.89 CHRONIC OBSTRUCTIVE ASTHMA WITH STATUS ASTHMATICUS: Primary | ICD-10-CM

## 2022-01-01 DIAGNOSIS — K59.00 CONSTIPATION, UNSPECIFIED CONSTIPATION TYPE: ICD-10-CM

## 2022-01-01 DIAGNOSIS — J96.11 CHRONIC RESPIRATORY FAILURE WITH HYPOXIA: ICD-10-CM

## 2022-01-01 DIAGNOSIS — H52.7 REFRACTIVE ERROR: ICD-10-CM

## 2022-01-01 DIAGNOSIS — B35.1 TINEA UNGUIUM: ICD-10-CM

## 2022-01-01 DIAGNOSIS — I10 HYPERTENSION, UNSPECIFIED TYPE: Primary | ICD-10-CM

## 2022-01-01 DIAGNOSIS — E03.9 HYPOTHYROIDISM, UNSPECIFIED TYPE: Primary | ICD-10-CM

## 2022-01-01 DIAGNOSIS — R80.9 PROTEINURIA: Primary | ICD-10-CM

## 2022-01-01 DIAGNOSIS — N17.9 AKI (ACUTE KIDNEY INJURY): ICD-10-CM

## 2022-01-01 DIAGNOSIS — E11.9 DIABETES MELLITUS WITHOUT COMPLICATION: ICD-10-CM

## 2022-01-01 DIAGNOSIS — N18.30 ANEMIA, CHRONIC RENAL FAILURE, STAGE 3 (MODERATE): ICD-10-CM

## 2022-01-01 DIAGNOSIS — D68.69 OTHER THROMBOPHILIA: ICD-10-CM

## 2022-01-01 DIAGNOSIS — B35.1 TOENAIL FUNGUS: ICD-10-CM

## 2022-01-01 DIAGNOSIS — Z15.89 HETEROZYGOUS MTHFR MUTATION C677T: Primary | ICD-10-CM

## 2022-01-01 DIAGNOSIS — N18.9 CHRONIC KIDNEY DISEASE, UNSPECIFIED CKD STAGE: Primary | ICD-10-CM

## 2022-01-01 DIAGNOSIS — J44.9 COPD MIXED TYPE: ICD-10-CM

## 2022-01-01 DIAGNOSIS — R30.0 DYSURIA: Primary | ICD-10-CM

## 2022-01-01 DIAGNOSIS — N18.30 STAGE 3 CHRONIC KIDNEY DISEASE, UNSPECIFIED WHETHER STAGE 3A OR 3B CKD: Primary | ICD-10-CM

## 2022-01-01 DIAGNOSIS — H04.123 DRY EYE SYNDROME, BILATERAL: ICD-10-CM

## 2022-01-01 DIAGNOSIS — L90.0 LICHEN SCLEROSUS: Primary | ICD-10-CM

## 2022-01-01 DIAGNOSIS — D64.9 ANEMIA, UNSPECIFIED TYPE: ICD-10-CM

## 2022-01-01 DIAGNOSIS — J44.89 ASTHMA-COPD OVERLAP SYNDROME: Primary | ICD-10-CM

## 2022-01-01 DIAGNOSIS — R10.13 EPIGASTRIC PAIN: ICD-10-CM

## 2022-01-01 DIAGNOSIS — Z96.1 PSEUDOPHAKIA: ICD-10-CM

## 2022-01-01 DIAGNOSIS — F17.200 SMOKER: ICD-10-CM

## 2022-01-01 DIAGNOSIS — E79.0 HYPERURICEMIA: ICD-10-CM

## 2022-01-01 DIAGNOSIS — N25.81 SECONDARY HYPERPARATHYROIDISM OF RENAL ORIGIN: ICD-10-CM

## 2022-01-01 DIAGNOSIS — B35.1 ONYCHOMYCOSIS: ICD-10-CM

## 2022-01-01 DIAGNOSIS — E11.9 DIABETES MELLITUS TYPE 2 WITHOUT RETINOPATHY: Primary | ICD-10-CM

## 2022-01-01 DIAGNOSIS — E11.40 TYPE 2 DIABETES MELLITUS WITH DIABETIC NEUROPATHY, WITHOUT LONG-TERM CURRENT USE OF INSULIN: Primary | ICD-10-CM

## 2022-01-01 DIAGNOSIS — E55.9 VITAMIN D DEFICIENCY: ICD-10-CM

## 2022-01-01 DIAGNOSIS — I82.4Y2 DEEP VEIN THROMBOSIS (DVT) OF PROXIMAL VEIN OF LEFT LOWER EXTREMITY, UNSPECIFIED CHRONICITY: ICD-10-CM

## 2022-01-01 DIAGNOSIS — Z95.818 PRESENCE OF OTHER CARDIAC IMPLANTS AND GRAFTS: ICD-10-CM

## 2022-01-01 DIAGNOSIS — E11.69 HYPERLIPIDEMIA ASSOCIATED WITH TYPE 2 DIABETES MELLITUS: ICD-10-CM

## 2022-01-01 DIAGNOSIS — J18.9 PNEUMONIA: ICD-10-CM

## 2022-01-01 DIAGNOSIS — I15.2 HYPERTENSION ASSOCIATED WITH DIABETES: Primary | ICD-10-CM

## 2022-01-01 DIAGNOSIS — I20.9 ANGINA PECTORIS, UNSPECIFIED: ICD-10-CM

## 2022-01-01 DIAGNOSIS — G89.29 CHRONIC PAIN OF RIGHT KNEE: Primary | ICD-10-CM

## 2022-01-01 DIAGNOSIS — R80.9 PROTEINURIA: ICD-10-CM

## 2022-01-01 DIAGNOSIS — B37.31 VAGINAL CANDIDIASIS: ICD-10-CM

## 2022-01-01 DIAGNOSIS — R26.81 GAIT INSTABILITY: Primary | ICD-10-CM

## 2022-01-01 DIAGNOSIS — I71.40 ABDOMINAL AORTIC ANEURYSM (AAA) WITHOUT RUPTURE: ICD-10-CM

## 2022-01-01 DIAGNOSIS — E11.22 TYPE 2 DIABETES MELLITUS WITH STAGE 4 CHRONIC KIDNEY DISEASE, WITHOUT LONG-TERM CURRENT USE OF INSULIN: Primary | ICD-10-CM

## 2022-01-01 DIAGNOSIS — I21.4 NSTEMI (NON-ST ELEVATED MYOCARDIAL INFARCTION): Primary | ICD-10-CM

## 2022-01-01 DIAGNOSIS — Z15.89 HETEROZYGOUS MTHFR MUTATION C677T: ICD-10-CM
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
## 2022-01-01 DIAGNOSIS — Z79.01 CHRONIC ANTICOAGULATION: Primary | ICD-10-CM

## 2022-01-01 LAB
ALBUMIN SERPL BCP-MCNC: 2.4 G/DL (ref 3.5–5.2)
ALBUMIN SERPL BCP-MCNC: 2.5 G/DL (ref 3.5–5.2)
ALBUMIN SERPL BCP-MCNC: 2.5 G/DL (ref 3.5–5.2)
ALBUMIN SERPL BCP-MCNC: 2.6 G/DL (ref 3.5–5.2)
ALBUMIN SERPL BCP-MCNC: 2.7 G/DL (ref 3.5–5.2)
ALBUMIN SERPL BCP-MCNC: 2.8 G/DL (ref 3.5–5.2)
ALBUMIN SERPL BCP-MCNC: 2.8 G/DL (ref 3.5–5.2)
ALBUMIN SERPL BCP-MCNC: 2.9 G/DL (ref 3.5–5.2)
ALBUMIN SERPL BCP-MCNC: 3 G/DL (ref 3.5–5.2)
ALBUMIN SERPL BCP-MCNC: 3 G/DL (ref 3.5–5.2)
ALBUMIN SERPL BCP-MCNC: 3.1 G/DL (ref 3.5–5.2)
ALBUMIN SERPL BCP-MCNC: 3.1 G/DL (ref 3.5–5.2)
ALBUMIN SERPL BCP-MCNC: 3.2 G/DL (ref 3.5–5.2)
ALBUMIN SERPL BCP-MCNC: 3.3 G/DL (ref 3.5–5.2)
ALBUMIN SERPL BCP-MCNC: 3.4 G/DL (ref 3.5–5.2)
ALP SERPL-CCNC: 103 U/L (ref 55–135)
ALP SERPL-CCNC: 104 U/L (ref 55–135)
ALP SERPL-CCNC: 56 U/L (ref 55–135)
ALP SERPL-CCNC: 59 U/L (ref 55–135)
ALP SERPL-CCNC: 60 U/L (ref 55–135)
ALP SERPL-CCNC: 61 U/L (ref 55–135)
ALP SERPL-CCNC: 63 U/L (ref 55–135)
ALP SERPL-CCNC: 65 U/L (ref 55–135)
ALP SERPL-CCNC: 66 U/L (ref 55–135)
ALP SERPL-CCNC: 67 U/L (ref 55–135)
ALP SERPL-CCNC: 70 U/L (ref 55–135)
ALP SERPL-CCNC: 70 U/L (ref 55–135)
ALP SERPL-CCNC: 72 U/L (ref 55–135)
ALP SERPL-CCNC: 73 U/L (ref 55–135)
ALP SERPL-CCNC: 74 U/L (ref 55–135)
ALP SERPL-CCNC: 76 U/L (ref 55–135)
ALP SERPL-CCNC: 78 U/L (ref 55–135)
ALP SERPL-CCNC: 79 U/L (ref 55–135)
ALP SERPL-CCNC: 82 U/L (ref 55–135)
ALP SERPL-CCNC: 86 U/L (ref 55–135)
ALP SERPL-CCNC: 90 U/L (ref 55–135)
ALP SERPL-CCNC: 90 U/L (ref 55–135)
ALT SERPL W/O P-5'-P-CCNC: 20 U/L (ref 10–44)
ALT SERPL W/O P-5'-P-CCNC: 21 U/L (ref 10–44)
ALT SERPL W/O P-5'-P-CCNC: 22 U/L (ref 10–44)
ALT SERPL W/O P-5'-P-CCNC: 23 U/L (ref 10–44)
ALT SERPL W/O P-5'-P-CCNC: 24 U/L (ref 10–44)
ALT SERPL W/O P-5'-P-CCNC: 25 U/L (ref 10–44)
ALT SERPL W/O P-5'-P-CCNC: 26 U/L (ref 10–44)
ALT SERPL W/O P-5'-P-CCNC: 26 U/L (ref 10–44)
ALT SERPL W/O P-5'-P-CCNC: 27 U/L (ref 10–44)
ALT SERPL W/O P-5'-P-CCNC: 29 U/L (ref 10–44)
ALT SERPL W/O P-5'-P-CCNC: 30 U/L (ref 10–44)
ALT SERPL W/O P-5'-P-CCNC: 31 U/L (ref 10–44)
ALT SERPL W/O P-5'-P-CCNC: 31 U/L (ref 10–44)
ALT SERPL W/O P-5'-P-CCNC: 33 U/L (ref 10–44)
ALT SERPL W/O P-5'-P-CCNC: 34 U/L (ref 10–44)
ALT SERPL W/O P-5'-P-CCNC: 50 U/L (ref 10–44)
ALT SERPL W/O P-5'-P-CCNC: 51 U/L (ref 10–44)
ALT SERPL W/O P-5'-P-CCNC: 58 U/L (ref 10–44)
ALT SERPL W/O P-5'-P-CCNC: 77 U/L (ref 10–44)
ANION GAP SERPL CALC-SCNC: 10 MMOL/L (ref 8–16)
ANION GAP SERPL CALC-SCNC: 11 MMOL/L (ref 8–16)
ANION GAP SERPL CALC-SCNC: 12 MMOL/L (ref 8–16)
ANION GAP SERPL CALC-SCNC: 12 MMOL/L (ref 8–16)
ANION GAP SERPL CALC-SCNC: 13 MMOL/L (ref 8–16)
ANION GAP SERPL CALC-SCNC: 13 MMOL/L (ref 8–16)
ANION GAP SERPL CALC-SCNC: 4 MMOL/L (ref 8–16)
ANION GAP SERPL CALC-SCNC: 5 MMOL/L (ref 8–16)
ANION GAP SERPL CALC-SCNC: 6 MMOL/L (ref 8–16)
ANION GAP SERPL CALC-SCNC: 7 MMOL/L (ref 8–16)
ANION GAP SERPL CALC-SCNC: 8 MMOL/L (ref 8–16)
ANION GAP SERPL CALC-SCNC: 9 MMOL/L (ref 8–16)
AORTIC ROOT ANNULUS: 2.87 CM
AORTIC ROOT ANNULUS: 2.92 CM
AORTIC VALVE CUSP SEPERATION: 1.27 CM
AST SERPL-CCNC: 23 U/L (ref 10–40)
AST SERPL-CCNC: 24 U/L (ref 10–40)
AST SERPL-CCNC: 26 U/L (ref 10–40)
AST SERPL-CCNC: 26 U/L (ref 10–40)
AST SERPL-CCNC: 27 U/L (ref 10–40)
AST SERPL-CCNC: 29 U/L (ref 10–40)
AST SERPL-CCNC: 30 U/L (ref 10–40)
AST SERPL-CCNC: 30 U/L (ref 10–40)
AST SERPL-CCNC: 31 U/L (ref 10–40)
AST SERPL-CCNC: 32 U/L (ref 10–40)
AST SERPL-CCNC: 32 U/L (ref 10–40)
AST SERPL-CCNC: 33 U/L (ref 10–40)
AST SERPL-CCNC: 35 U/L (ref 10–40)
AST SERPL-CCNC: 37 U/L (ref 10–40)
AST SERPL-CCNC: 46 U/L (ref 10–40)
AST SERPL-CCNC: 46 U/L (ref 10–40)
AST SERPL-CCNC: 59 U/L (ref 10–40)
AST SERPL-CCNC: 60 U/L (ref 10–40)
AST SERPL-CCNC: 61 U/L (ref 10–40)
AV INDEX (PROSTH): 0.58
AV INDEX (PROSTH): 0.75
AV MEAN GRADIENT: 11 MMHG
AV MEAN GRADIENT: 6 MMHG
AV PEAK GRADIENT: 17 MMHG
AV VALVE AREA: 1.77 CM2
AV VALVE AREA: 2.46 CM2
AV VELOCITY RATIO: 49.83
BACTERIA #/AREA URNS HPF: NEGATIVE /HPF
BACTERIA BLD CULT: ABNORMAL
BACTERIA BLD CULT: NORMAL
BACTERIA UR CULT: ABNORMAL
BASOPHILS # BLD AUTO: 0.01 K/UL (ref 0–0.2)
BASOPHILS # BLD AUTO: 0.02 K/UL (ref 0–0.2)
BASOPHILS # BLD AUTO: 0.03 K/UL (ref 0–0.2)
BASOPHILS # BLD AUTO: 0.04 K/UL (ref 0–0.2)
BASOPHILS # BLD AUTO: 0.05 K/UL (ref 0–0.2)
BASOPHILS # BLD AUTO: 0.05 K/UL (ref 0–0.2)
BASOPHILS # BLD AUTO: 0.06 K/UL (ref 0–0.2)
BASOPHILS NFR BLD: 0.1 % (ref 0–1.9)
BASOPHILS NFR BLD: 0.2 % (ref 0–1.9)
BASOPHILS NFR BLD: 0.3 % (ref 0–1.9)
BASOPHILS NFR BLD: 0.4 % (ref 0–1.9)
BASOPHILS NFR BLD: 0.5 % (ref 0–1.9)
BASOPHILS NFR BLD: 0.6 % (ref 0–1.9)
BASOPHILS NFR BLD: 0.7 % (ref 0–1.9)
BILIRUB SERPL-MCNC: 0.3 MG/DL (ref 0.1–1)
BILIRUB SERPL-MCNC: 0.3 MG/DL (ref 0.1–1)
BILIRUB SERPL-MCNC: 0.4 MG/DL (ref 0.1–1)
BILIRUB SERPL-MCNC: 0.4 MG/DL (ref 0.1–1)
BILIRUB SERPL-MCNC: 0.5 MG/DL (ref 0.1–1)
BILIRUB SERPL-MCNC: 0.6 MG/DL (ref 0.1–1)
BILIRUB SERPL-MCNC: 0.7 MG/DL (ref 0.1–1)
BILIRUB SERPL-MCNC: 0.7 MG/DL (ref 0.1–1)
BILIRUB SERPL-MCNC: 0.8 MG/DL (ref 0.1–1)
BILIRUB SERPL-MCNC: 0.8 MG/DL (ref 0.1–1)
BILIRUB SERPL-MCNC: 0.9 MG/DL (ref 0.1–1)
BILIRUB UR QL STRIP: NEGATIVE
BNP SERPL-MCNC: 2653 PG/ML (ref 0–99)
BNP SERPL-MCNC: 278 PG/ML (ref 0–99)
BNP SERPL-MCNC: 3449 PG/ML (ref 0–99)
BNP SERPL-MCNC: 366 PG/ML (ref 0–99)
BNP SERPL-MCNC: 403 PG/ML (ref 0–99)
BNP SERPL-MCNC: 557 PG/ML (ref 0–99)
BNP SERPL-MCNC: >4500 PG/ML (ref 0–99)
BNP SERPL-MCNC: >4500 PG/ML (ref 0–99)
BR6MWT: NORMAL
BSA FOR ECHO PROCEDURE: 1.58 M2
BSA FOR ECHO PROCEDURE: 1.62 M2
BUN SERPL-MCNC: 30 MG/DL (ref 10–30)
BUN SERPL-MCNC: 31 MG/DL (ref 10–30)
BUN SERPL-MCNC: 34 MG/DL (ref 10–30)
BUN SERPL-MCNC: 38 MG/DL (ref 10–30)
BUN SERPL-MCNC: 38 MG/DL (ref 10–30)
BUN SERPL-MCNC: 41 MG/DL (ref 10–30)
BUN SERPL-MCNC: 42 MG/DL (ref 10–30)
BUN SERPL-MCNC: 42 MG/DL (ref 10–30)
BUN SERPL-MCNC: 43 MG/DL (ref 10–30)
BUN SERPL-MCNC: 43 MG/DL (ref 10–30)
BUN SERPL-MCNC: 44 MG/DL (ref 10–30)
BUN SERPL-MCNC: 44 MG/DL (ref 10–30)
BUN SERPL-MCNC: 45 MG/DL (ref 10–30)
BUN SERPL-MCNC: 46 MG/DL (ref 10–30)
BUN SERPL-MCNC: 47 MG/DL (ref 10–30)
BUN SERPL-MCNC: 47 MG/DL (ref 10–30)
BUN SERPL-MCNC: 49 MG/DL (ref 10–30)
BUN SERPL-MCNC: 50 MG/DL (ref 10–30)
BUN SERPL-MCNC: 50 MG/DL (ref 10–30)
BUN SERPL-MCNC: 55 MG/DL (ref 10–30)
BUN SERPL-MCNC: 55 MG/DL (ref 10–30)
BUN SERPL-MCNC: 59 MG/DL (ref 10–30)
BUN SERPL-MCNC: 60 MG/DL (ref 10–30)
BUN SERPL-MCNC: 62 MG/DL (ref 10–30)
BUN SERPL-MCNC: 62 MG/DL (ref 10–30)
BUN SERPL-MCNC: 67 MG/DL (ref 10–30)
BUN SERPL-MCNC: 69 MG/DL (ref 10–30)
BUN SERPL-MCNC: 78 MG/DL (ref 10–30)
BUN SERPL-MCNC: 83 MG/DL (ref 10–30)
BUN SERPL-MCNC: 84 MG/DL (ref 10–30)
CALCIUM SERPL-MCNC: 7.8 MG/DL (ref 8.7–10.5)
CALCIUM SERPL-MCNC: 8 MG/DL (ref 8.7–10.5)
CALCIUM SERPL-MCNC: 8 MG/DL (ref 8.7–10.5)
CALCIUM SERPL-MCNC: 8.1 MG/DL (ref 8.7–10.5)
CALCIUM SERPL-MCNC: 8.2 MG/DL (ref 8.7–10.5)
CALCIUM SERPL-MCNC: 8.3 MG/DL (ref 8.7–10.5)
CALCIUM SERPL-MCNC: 8.4 MG/DL (ref 8.7–10.5)
CALCIUM SERPL-MCNC: 8.5 MG/DL (ref 8.7–10.5)
CALCIUM SERPL-MCNC: 8.6 MG/DL (ref 8.7–10.5)
CALCIUM SERPL-MCNC: 8.6 MG/DL (ref 8.7–10.5)
CALCIUM SERPL-MCNC: 8.7 MG/DL (ref 8.7–10.5)
CALCIUM SERPL-MCNC: 8.8 MG/DL (ref 8.7–10.5)
CALCIUM SERPL-MCNC: 8.9 MG/DL (ref 8.7–10.5)
CALCIUM SERPL-MCNC: 8.9 MG/DL (ref 8.7–10.5)
CHLORIDE SERPL-SCNC: 100 MMOL/L (ref 95–110)
CHLORIDE SERPL-SCNC: 100 MMOL/L (ref 95–110)
CHLORIDE SERPL-SCNC: 102 MMOL/L (ref 95–110)
CHLORIDE SERPL-SCNC: 103 MMOL/L (ref 95–110)
CHLORIDE SERPL-SCNC: 103 MMOL/L (ref 95–110)
CHLORIDE SERPL-SCNC: 104 MMOL/L (ref 95–110)
CHLORIDE SERPL-SCNC: 105 MMOL/L (ref 95–110)
CHLORIDE SERPL-SCNC: 105 MMOL/L (ref 95–110)
CHLORIDE SERPL-SCNC: 106 MMOL/L (ref 95–110)
CHLORIDE SERPL-SCNC: 107 MMOL/L (ref 95–110)
CHLORIDE SERPL-SCNC: 108 MMOL/L (ref 95–110)
CHLORIDE SERPL-SCNC: 109 MMOL/L (ref 95–110)
CHLORIDE SERPL-SCNC: 110 MMOL/L (ref 95–110)
CHLORIDE SERPL-SCNC: 110 MMOL/L (ref 95–110)
CHLORIDE SERPL-SCNC: 112 MMOL/L (ref 95–110)
CHLORIDE SERPL-SCNC: 98 MMOL/L (ref 95–110)
CHLORIDE SERPL-SCNC: 99 MMOL/L (ref 95–110)
CK MB SERPL-MCNC: 3.1 NG/ML (ref 0.1–6.5)
CK MB SERPL-MCNC: 6 NG/ML (ref 0.1–6.5)
CK SERPL-CCNC: 146 U/L (ref 20–180)
CK SERPL-CCNC: 227 U/L (ref 20–180)
CLARITY UR: CLEAR
CO2 SERPL-SCNC: 20 MMOL/L (ref 23–29)
CO2 SERPL-SCNC: 21 MMOL/L (ref 23–29)
CO2 SERPL-SCNC: 22 MMOL/L (ref 23–29)
CO2 SERPL-SCNC: 23 MMOL/L (ref 23–29)
CO2 SERPL-SCNC: 24 MMOL/L (ref 23–29)
CO2 SERPL-SCNC: 25 MMOL/L (ref 23–29)
CO2 SERPL-SCNC: 26 MMOL/L (ref 23–29)
CO2 SERPL-SCNC: 27 MMOL/L (ref 23–29)
CO2 SERPL-SCNC: 27 MMOL/L (ref 23–29)
CO2 SERPL-SCNC: 29 MMOL/L (ref 23–29)
COLOR UR: YELLOW
CREAT SERPL-MCNC: 1.6 MG/DL (ref 0.5–1.4)
CREAT SERPL-MCNC: 1.6 MG/DL (ref 0.5–1.4)
CREAT SERPL-MCNC: 1.7 MG/DL (ref 0.5–1.4)
CREAT SERPL-MCNC: 1.8 MG/DL (ref 0.5–1.4)
CREAT SERPL-MCNC: 1.9 MG/DL (ref 0.5–1.4)
CREAT SERPL-MCNC: 2 MG/DL (ref 0.5–1.4)
CREAT SERPL-MCNC: 2.1 MG/DL (ref 0.5–1.4)
CREAT SERPL-MCNC: 2.2 MG/DL (ref 0.5–1.4)
CREAT SERPL-MCNC: 2.3 MG/DL (ref 0.5–1.4)
CREAT SERPL-MCNC: 2.4 MG/DL (ref 0.5–1.4)
CREAT SERPL-MCNC: 2.5 MG/DL (ref 0.5–1.4)
CREAT UR-MCNC: 112 MG/DL (ref 15–325)
CREAT UR-MCNC: 94 MG/DL (ref 15–325)
CV ECHO LV RWT: 0.48 CM
CV ECHO LV RWT: 0.58 CM
DIFFERENTIAL METHOD: ABNORMAL
DOP CALC AO PEAK VEL: 2.08 M/S
DOP CALC AO VTI: 31.79 CM
DOP CALC AO VTI: 47.92 CM
DOP CALC LVOT AREA: 3 CM2
DOP CALC LVOT AREA: 3.3 CM2
DOP CALC LVOT DIAMETER: 1.97 CM
DOP CALC LVOT DIAMETER: 2.05 CM
DOP CALC LVOT PEAK VEL: 100.22 M/S
DOP CALC LVOT PEAK VEL: 103.64 M/S
DOP CALC LVOT STROKE VOLUME: 78.25 CM3
DOP CALC LVOT STROKE VOLUME: 84.63 CM3
DOP CALCLVOT PEAK VEL VTI: 23.72 CM
DOP CALCLVOT PEAK VEL VTI: 27.78 CM
E WAVE DECELERATION TIME: 194.76 MSEC
E WAVE DECELERATION TIME: 542.76 MSEC
E/A RATIO: 0.82
E/A RATIO: 1.1
E/E' RATIO: 33 M/S
E/E' RATIO: 46 M/S
ECHO LV POSTERIOR WALL: 1.28 CM (ref 0.6–1.1)
ECHO LV POSTERIOR WALL: 1.33 CM (ref 0.6–1.1)
EJECTION FRACTION: 45 %
EJECTION FRACTION: 65 %
EOSINOPHIL # BLD AUTO: 0 K/UL (ref 0–0.5)
EOSINOPHIL # BLD AUTO: 0.1 K/UL (ref 0–0.5)
EOSINOPHIL # BLD AUTO: 0.1 K/UL (ref 0–0.5)
EOSINOPHIL # BLD AUTO: 0.2 K/UL (ref 0–0.5)
EOSINOPHIL # BLD AUTO: 0.2 K/UL (ref 0–0.5)
EOSINOPHIL # BLD AUTO: 0.3 K/UL (ref 0–0.5)
EOSINOPHIL # BLD AUTO: 0.3 K/UL (ref 0–0.5)
EOSINOPHIL # BLD AUTO: 0.4 K/UL (ref 0–0.5)
EOSINOPHIL # BLD AUTO: 0.8 K/UL (ref 0–0.5)
EOSINOPHIL NFR BLD: 0 % (ref 0–8)
EOSINOPHIL NFR BLD: 0.1 % (ref 0–8)
EOSINOPHIL NFR BLD: 0.2 % (ref 0–8)
EOSINOPHIL NFR BLD: 0.3 % (ref 0–8)
EOSINOPHIL NFR BLD: 0.5 % (ref 0–8)
EOSINOPHIL NFR BLD: 0.5 % (ref 0–8)
EOSINOPHIL NFR BLD: 0.7 % (ref 0–8)
EOSINOPHIL NFR BLD: 1 % (ref 0–8)
EOSINOPHIL NFR BLD: 1.2 % (ref 0–8)
EOSINOPHIL NFR BLD: 2.3 % (ref 0–8)
EOSINOPHIL NFR BLD: 2.7 % (ref 0–8)
EOSINOPHIL NFR BLD: 3.9 % (ref 0–8)
EOSINOPHIL NFR BLD: 4 % (ref 0–8)
EOSINOPHIL NFR BLD: 4.5 % (ref 0–8)
EOSINOPHIL NFR BLD: 4.9 % (ref 0–8)
EOSINOPHIL NFR BLD: 6.5 % (ref 0–8)
EOSINOPHIL NFR BLD: 6.7 % (ref 0–8)
EOSINOPHIL NFR BLD: 6.7 % (ref 0–8)
EOSINOPHIL NFR BLD: 9 % (ref 0–8)
ERYTHROCYTE [DISTWIDTH] IN BLOOD BY AUTOMATED COUNT: 13.2 % (ref 11.5–14.5)
ERYTHROCYTE [DISTWIDTH] IN BLOOD BY AUTOMATED COUNT: 13.2 % (ref 11.5–14.5)
ERYTHROCYTE [DISTWIDTH] IN BLOOD BY AUTOMATED COUNT: 13.4 % (ref 11.5–14.5)
ERYTHROCYTE [DISTWIDTH] IN BLOOD BY AUTOMATED COUNT: 13.5 % (ref 11.5–14.5)
ERYTHROCYTE [DISTWIDTH] IN BLOOD BY AUTOMATED COUNT: 13.6 % (ref 11.5–14.5)
ERYTHROCYTE [DISTWIDTH] IN BLOOD BY AUTOMATED COUNT: 13.7 % (ref 11.5–14.5)
ERYTHROCYTE [DISTWIDTH] IN BLOOD BY AUTOMATED COUNT: 13.8 % (ref 11.5–14.5)
ERYTHROCYTE [DISTWIDTH] IN BLOOD BY AUTOMATED COUNT: 13.9 % (ref 11.5–14.5)
ERYTHROCYTE [DISTWIDTH] IN BLOOD BY AUTOMATED COUNT: 13.9 % (ref 11.5–14.5)
ERYTHROCYTE [DISTWIDTH] IN BLOOD BY AUTOMATED COUNT: 14 % (ref 11.5–14.5)
ERYTHROCYTE [DISTWIDTH] IN BLOOD BY AUTOMATED COUNT: 14.1 % (ref 11.5–14.5)
ERYTHROCYTE [DISTWIDTH] IN BLOOD BY AUTOMATED COUNT: 14.2 % (ref 11.5–14.5)
ERYTHROCYTE [DISTWIDTH] IN BLOOD BY AUTOMATED COUNT: 14.2 % (ref 11.5–14.5)
ERYTHROCYTE [DISTWIDTH] IN BLOOD BY AUTOMATED COUNT: 14.3 % (ref 11.5–14.5)
ERYTHROCYTE [DISTWIDTH] IN BLOOD BY AUTOMATED COUNT: 14.4 % (ref 11.5–14.5)
ERYTHROCYTE [DISTWIDTH] IN BLOOD BY AUTOMATED COUNT: 14.4 % (ref 11.5–14.5)
ERYTHROCYTE [DISTWIDTH] IN BLOOD BY AUTOMATED COUNT: 14.5 % (ref 11.5–14.5)
ERYTHROCYTE [DISTWIDTH] IN BLOOD BY AUTOMATED COUNT: 14.6 % (ref 11.5–14.5)
ERYTHROCYTE [DISTWIDTH] IN BLOOD BY AUTOMATED COUNT: 14.6 % (ref 11.5–14.5)
ERYTHROCYTE [DISTWIDTH] IN BLOOD BY AUTOMATED COUNT: 14.8 % (ref 11.5–14.5)
ERYTHROCYTE [DISTWIDTH] IN BLOOD BY AUTOMATED COUNT: 14.9 % (ref 11.5–14.5)
ERYTHROCYTE [DISTWIDTH] IN BLOOD BY AUTOMATED COUNT: 14.9 % (ref 11.5–14.5)
ERYTHROCYTE [DISTWIDTH] IN BLOOD BY AUTOMATED COUNT: 15.1 % (ref 11.5–14.5)
ERYTHROCYTE [DISTWIDTH] IN BLOOD BY AUTOMATED COUNT: 15.3 % (ref 11.5–14.5)
EST. GFR  (AFRICAN AMERICAN): 20.6 ML/MIN/1.73 M^2
EST. GFR  (AFRICAN AMERICAN): 20.8 ML/MIN/1.73 M^2
EST. GFR  (AFRICAN AMERICAN): 23 ML/MIN/1.73 M^2
EST. GFR  (AFRICAN AMERICAN): 23.2 ML/MIN/1.73 M^2
EST. GFR  (AFRICAN AMERICAN): 23.2 ML/MIN/1.73 M^2
EST. GFR  (AFRICAN AMERICAN): 24.4 ML/MIN/1.73 M^2
EST. GFR  (AFRICAN AMERICAN): 24.4 ML/MIN/1.73 M^2
EST. GFR  (AFRICAN AMERICAN): 24.6 ML/MIN/1.73 M^2
EST. GFR  (AFRICAN AMERICAN): 26.2 ML/MIN/1.73 M^2
EST. GFR  (AFRICAN AMERICAN): 28 ML/MIN/1.73 M^2
EST. GFR  (AFRICAN AMERICAN): 28 ML/MIN/1.73 M^2
EST. GFR  (AFRICAN AMERICAN): 30 ML/MIN/1.73 M^2
EST. GFR  (AFRICAN AMERICAN): 30 ML/MIN/1.73 M^2
EST. GFR  (AFRICAN AMERICAN): 32.2 ML/MIN/1.73 M^2
EST. GFR  (AFRICAN AMERICAN): 32.2 ML/MIN/1.73 M^2
EST. GFR  (NO RACE VARIABLE): 17.6 ML/MIN/1.73 M^2
EST. GFR  (NO RACE VARIABLE): 18.5 ML/MIN/1.73 M^2
EST. GFR  (NO RACE VARIABLE): 19.5 ML/MIN/1.73 M^2
EST. GFR  (NO RACE VARIABLE): 20.5 ML/MIN/1.73 M^2
EST. GFR  (NO RACE VARIABLE): 21.7 ML/MIN/1.73 M^2
EST. GFR  (NO RACE VARIABLE): 23 ML/MIN/1.73 M^2
EST. GFR  (NO RACE VARIABLE): 24.5 ML/MIN/1.73 M^2
EST. GFR  (NO RACE VARIABLE): 24.5 ML/MIN/1.73 M^2
EST. GFR  (NO RACE VARIABLE): 26.1 ML/MIN/1.73 M^2
EST. GFR  (NO RACE VARIABLE): 28 ML/MIN/1.73 M^2
EST. GFR  (NON AFRICAN AMERICAN): 17.9 ML/MIN/1.73 M^2
EST. GFR  (NON AFRICAN AMERICAN): 18 ML/MIN/1.73 M^2
EST. GFR  (NON AFRICAN AMERICAN): 20 ML/MIN/1.73 M^2
EST. GFR  (NON AFRICAN AMERICAN): 20.1 ML/MIN/1.73 M^2
EST. GFR  (NON AFRICAN AMERICAN): 20.1 ML/MIN/1.73 M^2
EST. GFR  (NON AFRICAN AMERICAN): 21.2 ML/MIN/1.73 M^2
EST. GFR  (NON AFRICAN AMERICAN): 21.2 ML/MIN/1.73 M^2
EST. GFR  (NON AFRICAN AMERICAN): 21.4 ML/MIN/1.73 M^2
EST. GFR  (NON AFRICAN AMERICAN): 22.7 ML/MIN/1.73 M^2
EST. GFR  (NON AFRICAN AMERICAN): 24.3 ML/MIN/1.73 M^2
EST. GFR  (NON AFRICAN AMERICAN): 24.3 ML/MIN/1.73 M^2
EST. GFR  (NON AFRICAN AMERICAN): 26 ML/MIN/1.73 M^2
EST. GFR  (NON AFRICAN AMERICAN): 26 ML/MIN/1.73 M^2
EST. GFR  (NON AFRICAN AMERICAN): 28 ML/MIN/1.73 M^2
EST. GFR  (NON AFRICAN AMERICAN): 28 ML/MIN/1.73 M^2
ESTIMATED AVG GLUCOSE: 117 MG/DL (ref 68–131)
ESTIMATED AVG GLUCOSE: 126 MG/DL (ref 68–131)
ESTIMATED AVG GLUCOSE: 157 MG/DL (ref 68–131)
FERRITIN SERPL-MCNC: 302 NG/ML (ref 20–300)
FERRITIN SERPL-MCNC: 347 NG/ML (ref 20–300)
FERRITIN SERPL-MCNC: 369 NG/ML (ref 20–300)
FERRITIN SERPL-MCNC: 474 NG/ML (ref 20–300)
FERRITIN SERPL-MCNC: 516 NG/ML (ref 20–300)
FERRITIN SERPL-MCNC: 601 NG/ML (ref 20–300)
FRACTIONAL SHORTENING: 32 % (ref 28–44)
FRACTIONAL SHORTENING: 33 % (ref 28–44)
GLUCOSE SERPL-MCNC: 100 MG/DL (ref 70–110)
GLUCOSE SERPL-MCNC: 101 MG/DL (ref 70–110)
GLUCOSE SERPL-MCNC: 102 MG/DL (ref 70–110)
GLUCOSE SERPL-MCNC: 104 MG/DL (ref 70–110)
GLUCOSE SERPL-MCNC: 105 MG/DL (ref 70–110)
GLUCOSE SERPL-MCNC: 106 MG/DL (ref 70–110)
GLUCOSE SERPL-MCNC: 106 MG/DL (ref 70–110)
GLUCOSE SERPL-MCNC: 107 MG/DL (ref 70–110)
GLUCOSE SERPL-MCNC: 107 MG/DL (ref 70–110)
GLUCOSE SERPL-MCNC: 108 MG/DL (ref 70–110)
GLUCOSE SERPL-MCNC: 108 MG/DL (ref 70–110)
GLUCOSE SERPL-MCNC: 109 MG/DL (ref 70–110)
GLUCOSE SERPL-MCNC: 109 MG/DL (ref 70–110)
GLUCOSE SERPL-MCNC: 110 MG/DL (ref 70–110)
GLUCOSE SERPL-MCNC: 110 MG/DL (ref 70–110)
GLUCOSE SERPL-MCNC: 115 MG/DL (ref 70–110)
GLUCOSE SERPL-MCNC: 115 MG/DL (ref 70–110)
GLUCOSE SERPL-MCNC: 116 MG/DL (ref 70–110)
GLUCOSE SERPL-MCNC: 116 MG/DL (ref 70–110)
GLUCOSE SERPL-MCNC: 117 MG/DL (ref 70–110)
GLUCOSE SERPL-MCNC: 118 MG/DL (ref 70–110)
GLUCOSE SERPL-MCNC: 119 MG/DL (ref 70–110)
GLUCOSE SERPL-MCNC: 120 MG/DL (ref 70–110)
GLUCOSE SERPL-MCNC: 120 MG/DL (ref 70–110)
GLUCOSE SERPL-MCNC: 122 MG/DL (ref 70–110)
GLUCOSE SERPL-MCNC: 122 MG/DL (ref 70–110)
GLUCOSE SERPL-MCNC: 123 MG/DL (ref 70–110)
GLUCOSE SERPL-MCNC: 123 MG/DL (ref 70–110)
GLUCOSE SERPL-MCNC: 124 MG/DL (ref 70–110)
GLUCOSE SERPL-MCNC: 127 MG/DL (ref 70–110)
GLUCOSE SERPL-MCNC: 127 MG/DL (ref 70–110)
GLUCOSE SERPL-MCNC: 130 MG/DL (ref 70–110)
GLUCOSE SERPL-MCNC: 132 MG/DL (ref 70–110)
GLUCOSE SERPL-MCNC: 134 MG/DL (ref 70–110)
GLUCOSE SERPL-MCNC: 134 MG/DL (ref 70–110)
GLUCOSE SERPL-MCNC: 136 MG/DL (ref 70–110)
GLUCOSE SERPL-MCNC: 137 MG/DL (ref 70–110)
GLUCOSE SERPL-MCNC: 137 MG/DL (ref 70–110)
GLUCOSE SERPL-MCNC: 138 MG/DL (ref 70–110)
GLUCOSE SERPL-MCNC: 139 MG/DL (ref 70–110)
GLUCOSE SERPL-MCNC: 140 MG/DL (ref 70–110)
GLUCOSE SERPL-MCNC: 141 MG/DL (ref 70–110)
GLUCOSE SERPL-MCNC: 141 MG/DL (ref 70–110)
GLUCOSE SERPL-MCNC: 142 MG/DL (ref 70–110)
GLUCOSE SERPL-MCNC: 142 MG/DL (ref 70–110)
GLUCOSE SERPL-MCNC: 146 MG/DL (ref 70–110)
GLUCOSE SERPL-MCNC: 151 MG/DL (ref 70–110)
GLUCOSE SERPL-MCNC: 153 MG/DL (ref 70–110)
GLUCOSE SERPL-MCNC: 154 MG/DL (ref 70–110)
GLUCOSE SERPL-MCNC: 156 MG/DL (ref 70–110)
GLUCOSE SERPL-MCNC: 157 MG/DL (ref 70–110)
GLUCOSE SERPL-MCNC: 161 MG/DL (ref 70–110)
GLUCOSE SERPL-MCNC: 162 MG/DL (ref 70–110)
GLUCOSE SERPL-MCNC: 163 MG/DL (ref 70–110)
GLUCOSE SERPL-MCNC: 166 MG/DL (ref 70–110)
GLUCOSE SERPL-MCNC: 166 MG/DL (ref 70–110)
GLUCOSE SERPL-MCNC: 171 MG/DL (ref 70–110)
GLUCOSE SERPL-MCNC: 175 MG/DL (ref 70–110)
GLUCOSE SERPL-MCNC: 178 MG/DL (ref 70–110)
GLUCOSE SERPL-MCNC: 182 MG/DL (ref 70–110)
GLUCOSE SERPL-MCNC: 187 MG/DL (ref 70–110)
GLUCOSE SERPL-MCNC: 189 MG/DL (ref 70–110)
GLUCOSE SERPL-MCNC: 196 MG/DL (ref 70–110)
GLUCOSE SERPL-MCNC: 199 MG/DL (ref 70–110)
GLUCOSE SERPL-MCNC: 205 MG/DL (ref 70–110)
GLUCOSE SERPL-MCNC: 213 MG/DL (ref 70–110)
GLUCOSE SERPL-MCNC: 248 MG/DL (ref 70–110)
GLUCOSE SERPL-MCNC: 57 MG/DL (ref 70–110)
GLUCOSE SERPL-MCNC: 61 MG/DL (ref 70–110)
GLUCOSE SERPL-MCNC: 81 MG/DL (ref 70–110)
GLUCOSE SERPL-MCNC: 90 MG/DL (ref 70–110)
GLUCOSE SERPL-MCNC: 91 MG/DL (ref 70–110)
GLUCOSE SERPL-MCNC: 93 MG/DL (ref 70–110)
GLUCOSE SERPL-MCNC: 94 MG/DL (ref 70–110)
GLUCOSE SERPL-MCNC: 96 MG/DL (ref 70–110)
GLUCOSE SERPL-MCNC: 96 MG/DL (ref 70–110)
GLUCOSE SERPL-MCNC: 97 MG/DL (ref 70–110)
GLUCOSE SERPL-MCNC: 98 MG/DL (ref 70–110)
GLUCOSE UR QL STRIP: ABNORMAL
GLUCOSE UR QL STRIP: NEGATIVE
HBA1C MFR BLD: 5.7 % (ref 4–5.6)
HBA1C MFR BLD: 6 % (ref 4.5–6.2)
HBA1C MFR BLD: 7.1 % (ref 4.5–6.2)
HCT VFR BLD AUTO: 24.4 % (ref 37–48.5)
HCT VFR BLD AUTO: 24.8 % (ref 37–48.5)
HCT VFR BLD AUTO: 26.3 % (ref 37–48.5)
HCT VFR BLD AUTO: 26.9 % (ref 37–48.5)
HCT VFR BLD AUTO: 26.9 % (ref 37–48.5)
HCT VFR BLD AUTO: 27.1 % (ref 37–48.5)
HCT VFR BLD AUTO: 27.4 % (ref 37–48.5)
HCT VFR BLD AUTO: 27.5 % (ref 37–48.5)
HCT VFR BLD AUTO: 27.6 % (ref 37–48.5)
HCT VFR BLD AUTO: 27.7 % (ref 37–48.5)
HCT VFR BLD AUTO: 27.8 % (ref 37–48.5)
HCT VFR BLD AUTO: 28 % (ref 37–48.5)
HCT VFR BLD AUTO: 28 % (ref 37–48.5)
HCT VFR BLD AUTO: 28.2 % (ref 37–48.5)
HCT VFR BLD AUTO: 28.3 % (ref 37–48.5)
HCT VFR BLD AUTO: 28.3 % (ref 37–48.5)
HCT VFR BLD AUTO: 28.4 % (ref 37–48.5)
HCT VFR BLD AUTO: 28.8 % (ref 37–48.5)
HCT VFR BLD AUTO: 29.1 % (ref 37–48.5)
HCT VFR BLD AUTO: 29.2 % (ref 37–48.5)
HCT VFR BLD AUTO: 29.4 % (ref 37–48.5)
HCT VFR BLD AUTO: 29.4 % (ref 37–48.5)
HCT VFR BLD AUTO: 29.5 % (ref 37–48.5)
HCT VFR BLD AUTO: 29.7 % (ref 37–48.5)
HCT VFR BLD AUTO: 29.7 % (ref 37–48.5)
HCT VFR BLD AUTO: 29.8 % (ref 37–48.5)
HCT VFR BLD AUTO: 29.9 % (ref 37–48.5)
HCT VFR BLD AUTO: 30.1 % (ref 37–48.5)
HCT VFR BLD AUTO: 30.1 % (ref 37–48.5)
HCT VFR BLD AUTO: 30.3 % (ref 37–48.5)
HCT VFR BLD AUTO: 30.3 % (ref 37–48.5)
HCT VFR BLD AUTO: 30.5 % (ref 37–48.5)
HCT VFR BLD AUTO: 30.6 % (ref 37–48.5)
HCT VFR BLD AUTO: 30.9 % (ref 37–48.5)
HCT VFR BLD AUTO: 31.4 % (ref 37–48.5)
HCT VFR BLD AUTO: 31.7 % (ref 37–48.5)
HCT VFR BLD AUTO: 31.8 % (ref 37–48.5)
HCT VFR BLD AUTO: 32.4 % (ref 37–48.5)
HGB BLD-MCNC: 10 G/DL (ref 12–16)
HGB BLD-MCNC: 10 G/DL (ref 12–16)
HGB BLD-MCNC: 10.1 G/DL (ref 12–16)
HGB BLD-MCNC: 10.2 G/DL (ref 12–16)
HGB BLD-MCNC: 10.3 G/DL (ref 12–16)
HGB BLD-MCNC: 10.4 G/DL (ref 12–16)
HGB BLD-MCNC: 7.7 G/DL (ref 12–16)
HGB BLD-MCNC: 7.8 G/DL (ref 12–16)
HGB BLD-MCNC: 8.4 G/DL (ref 12–16)
HGB BLD-MCNC: 8.4 G/DL (ref 12–16)
HGB BLD-MCNC: 8.5 G/DL (ref 12–16)
HGB BLD-MCNC: 8.6 G/DL (ref 12–16)
HGB BLD-MCNC: 8.7 G/DL (ref 12–16)
HGB BLD-MCNC: 8.8 G/DL (ref 12–16)
HGB BLD-MCNC: 8.9 G/DL (ref 12–16)
HGB BLD-MCNC: 8.9 G/DL (ref 12–16)
HGB BLD-MCNC: 9 G/DL (ref 12–16)
HGB BLD-MCNC: 9.1 G/DL (ref 12–16)
HGB BLD-MCNC: 9.1 G/DL (ref 12–16)
HGB BLD-MCNC: 9.2 G/DL (ref 12–16)
HGB BLD-MCNC: 9.3 G/DL (ref 12–16)
HGB BLD-MCNC: 9.4 G/DL (ref 12–16)
HGB BLD-MCNC: 9.5 G/DL (ref 12–16)
HGB BLD-MCNC: 9.6 G/DL (ref 12–16)
HGB BLD-MCNC: 9.7 G/DL (ref 12–16)
HGB BLD-MCNC: 9.8 G/DL (ref 12–16)
HGB BLD-MCNC: 9.8 G/DL (ref 12–16)
HGB UR QL STRIP: ABNORMAL
HGB UR QL STRIP: NEGATIVE
HYALINE CASTS #/AREA URNS LPF: 1 /LPF
HYALINE CASTS #/AREA URNS LPF: 12 /LPF
HYALINE CASTS #/AREA URNS LPF: 2 /LPF
HYALINE CASTS #/AREA URNS LPF: 21 /LPF
HYALINE CASTS #/AREA URNS LPF: 29 /LPF
HYALINE CASTS #/AREA URNS LPF: 4 /LPF
HYALINE CASTS #/AREA URNS LPF: 5 /LPF
IMM GRANULOCYTES # BLD AUTO: 0.02 K/UL (ref 0–0.04)
IMM GRANULOCYTES # BLD AUTO: 0.03 K/UL (ref 0–0.04)
IMM GRANULOCYTES # BLD AUTO: 0.04 K/UL (ref 0–0.04)
IMM GRANULOCYTES # BLD AUTO: 0.05 K/UL (ref 0–0.04)
IMM GRANULOCYTES # BLD AUTO: 0.06 K/UL (ref 0–0.04)
IMM GRANULOCYTES # BLD AUTO: 0.07 K/UL (ref 0–0.04)
IMM GRANULOCYTES # BLD AUTO: 0.07 K/UL (ref 0–0.04)
IMM GRANULOCYTES # BLD AUTO: 0.08 K/UL (ref 0–0.04)
IMM GRANULOCYTES # BLD AUTO: 0.11 K/UL (ref 0–0.04)
IMM GRANULOCYTES # BLD AUTO: 0.22 K/UL (ref 0–0.04)
IMM GRANULOCYTES NFR BLD AUTO: 0.3 % (ref 0–0.5)
IMM GRANULOCYTES NFR BLD AUTO: 0.4 % (ref 0–0.5)
IMM GRANULOCYTES NFR BLD AUTO: 0.5 % (ref 0–0.5)
IMM GRANULOCYTES NFR BLD AUTO: 0.6 % (ref 0–0.5)
IMM GRANULOCYTES NFR BLD AUTO: 0.7 % (ref 0–0.5)
IMM GRANULOCYTES NFR BLD AUTO: 0.8 % (ref 0–0.5)
IMM GRANULOCYTES NFR BLD AUTO: 0.9 % (ref 0–0.5)
IMM GRANULOCYTES NFR BLD AUTO: 0.9 % (ref 0–0.5)
IMM GRANULOCYTES NFR BLD AUTO: 1 % (ref 0–0.5)
IMM GRANULOCYTES NFR BLD AUTO: 1 % (ref 0–0.5)
IMM GRANULOCYTES NFR BLD AUTO: 1.3 % (ref 0–0.5)
IMM GRANULOCYTES NFR BLD AUTO: 1.4 % (ref 0–0.5)
INTERVENTRICULAR SEPTUM: 0.85 CM (ref 0.6–1.1)
INTERVENTRICULAR SEPTUM: 1.27 CM (ref 0.6–1.1)
IRON SERPL-MCNC: 116 UG/DL (ref 30–160)
IRON SERPL-MCNC: 39 UG/DL (ref 30–160)
IRON SERPL-MCNC: 44 UG/DL (ref 30–160)
IRON SERPL-MCNC: 56 UG/DL (ref 30–160)
IRON SERPL-MCNC: 67 UG/DL (ref 30–160)
IRON SERPL-MCNC: 80 UG/DL (ref 30–160)
IVRT: 62.16 MSEC
KETONES UR QL STRIP: ABNORMAL
KETONES UR QL STRIP: NEGATIVE
LACTATE SERPL-SCNC: 0.7 MMOL/L (ref 0.5–1.9)
LACTATE SERPL-SCNC: 0.9 MMOL/L (ref 0.5–1.9)
LEFT ATRIUM SIZE: 3.86 CM
LEFT ATRIUM SIZE: 4.3 CM
LEFT ATRIUM VOLUME INDEX MOD: 83.7 ML/M2
LEFT ATRIUM VOLUME MOD: 132.2 CM3
LEFT INTERNAL DIMENSION IN SYSTOLE: 2.96 CM (ref 2.1–4)
LEFT INTERNAL DIMENSION IN SYSTOLE: 3.76 CM (ref 2.1–4)
LEFT VENTRICLE DIASTOLIC VOLUME INDEX: 115.38 ML/M2
LEFT VENTRICLE DIASTOLIC VOLUME: 182.3 ML
LEFT VENTRICLE MASS INDEX: 130 G/M2
LEFT VENTRICLE MASS INDEX: 153 G/M2
LEFT VENTRICLE SYSTOLIC VOLUME INDEX: 41.6 ML/M2
LEFT VENTRICLE SYSTOLIC VOLUME: 65.66 ML
LEFT VENTRICULAR INTERNAL DIMENSION IN DIASTOLE: 4.4 CM (ref 3.5–6)
LEFT VENTRICULAR INTERNAL DIMENSION IN DIASTOLE: 5.53 CM (ref 3.5–6)
LEFT VENTRICULAR MASS: 209.03 G
LEFT VENTRICULAR MASS: 241.22 G
LEUKOCYTE ESTERASE UR QL STRIP: ABNORMAL
LEUKOCYTE ESTERASE UR QL STRIP: NEGATIVE
LV LATERAL E/E' RATIO: 27.5 M/S
LV LATERAL E/E' RATIO: 46 M/S
LV SEPTAL E/E' RATIO: 41.25 M/S
LV SEPTAL E/E' RATIO: 46 M/S
LYMPHOCYTES # BLD AUTO: 0.8 K/UL (ref 1–4.8)
LYMPHOCYTES # BLD AUTO: 0.9 K/UL (ref 1–4.8)
LYMPHOCYTES # BLD AUTO: 1 K/UL (ref 1–4.8)
LYMPHOCYTES # BLD AUTO: 1.1 K/UL (ref 1–4.8)
LYMPHOCYTES # BLD AUTO: 1.1 K/UL (ref 1–4.8)
LYMPHOCYTES # BLD AUTO: 1.3 K/UL (ref 1–4.8)
LYMPHOCYTES # BLD AUTO: 1.4 K/UL (ref 1–4.8)
LYMPHOCYTES # BLD AUTO: 1.5 K/UL (ref 1–4.8)
LYMPHOCYTES # BLD AUTO: 1.6 K/UL (ref 1–4.8)
LYMPHOCYTES # BLD AUTO: 1.7 K/UL (ref 1–4.8)
LYMPHOCYTES # BLD AUTO: 1.8 K/UL (ref 1–4.8)
LYMPHOCYTES # BLD AUTO: 1.9 K/UL (ref 1–4.8)
LYMPHOCYTES # BLD AUTO: 2 K/UL (ref 1–4.8)
LYMPHOCYTES NFR BLD: 11.2 % (ref 18–48)
LYMPHOCYTES NFR BLD: 11.3 % (ref 18–48)
LYMPHOCYTES NFR BLD: 13.4 % (ref 18–48)
LYMPHOCYTES NFR BLD: 13.6 % (ref 18–48)
LYMPHOCYTES NFR BLD: 14.4 % (ref 18–48)
LYMPHOCYTES NFR BLD: 14.8 % (ref 18–48)
LYMPHOCYTES NFR BLD: 15.1 % (ref 18–48)
LYMPHOCYTES NFR BLD: 15.6 % (ref 18–48)
LYMPHOCYTES NFR BLD: 16.8 % (ref 18–48)
LYMPHOCYTES NFR BLD: 16.9 % (ref 18–48)
LYMPHOCYTES NFR BLD: 17.1 % (ref 18–48)
LYMPHOCYTES NFR BLD: 17.5 % (ref 18–48)
LYMPHOCYTES NFR BLD: 17.6 % (ref 18–48)
LYMPHOCYTES NFR BLD: 18.8 % (ref 18–48)
LYMPHOCYTES NFR BLD: 21.5 % (ref 18–48)
LYMPHOCYTES NFR BLD: 21.6 % (ref 18–48)
LYMPHOCYTES NFR BLD: 22.8 % (ref 18–48)
LYMPHOCYTES NFR BLD: 23.2 % (ref 18–48)
LYMPHOCYTES NFR BLD: 23.2 % (ref 18–48)
LYMPHOCYTES NFR BLD: 23.3 % (ref 18–48)
LYMPHOCYTES NFR BLD: 23.4 % (ref 18–48)
LYMPHOCYTES NFR BLD: 24.6 % (ref 18–48)
LYMPHOCYTES NFR BLD: 24.7 % (ref 18–48)
LYMPHOCYTES NFR BLD: 25.1 % (ref 18–48)
LYMPHOCYTES NFR BLD: 25.5 % (ref 18–48)
LYMPHOCYTES NFR BLD: 25.9 % (ref 18–48)
LYMPHOCYTES NFR BLD: 26 % (ref 18–48)
LYMPHOCYTES NFR BLD: 28.2 % (ref 18–48)
LYMPHOCYTES NFR BLD: 28.9 % (ref 18–48)
LYMPHOCYTES NFR BLD: 29 % (ref 18–48)
LYMPHOCYTES NFR BLD: 32.3 % (ref 18–48)
LYMPHOCYTES NFR BLD: 8.3 % (ref 18–48)
LYMPHOCYTES NFR BLD: 8.5 % (ref 18–48)
MAGNESIUM SERPL-MCNC: 1.7 MG/DL (ref 1.6–2.6)
MAGNESIUM SERPL-MCNC: 1.8 MG/DL (ref 1.6–2.6)
MAGNESIUM SERPL-MCNC: 1.8 MG/DL (ref 1.6–2.6)
MAGNESIUM SERPL-MCNC: 1.9 MG/DL (ref 1.6–2.6)
MAGNESIUM SERPL-MCNC: 2.1 MG/DL (ref 1.6–2.6)
MAGNESIUM SERPL-MCNC: 2.5 MG/DL (ref 1.6–2.6)
MCH RBC QN AUTO: 30.6 PG (ref 27–31)
MCH RBC QN AUTO: 30.7 PG (ref 27–31)
MCH RBC QN AUTO: 30.8 PG (ref 27–31)
MCH RBC QN AUTO: 31 PG (ref 27–31)
MCH RBC QN AUTO: 31 PG (ref 27–31)
MCH RBC QN AUTO: 31.1 PG (ref 27–31)
MCH RBC QN AUTO: 31.5 PG (ref 27–31)
MCH RBC QN AUTO: 31.6 PG (ref 27–31)
MCH RBC QN AUTO: 31.7 PG (ref 27–31)
MCH RBC QN AUTO: 31.7 PG (ref 27–31)
MCH RBC QN AUTO: 31.8 PG (ref 27–31)
MCH RBC QN AUTO: 31.8 PG (ref 27–31)
MCH RBC QN AUTO: 31.9 PG (ref 27–31)
MCH RBC QN AUTO: 31.9 PG (ref 27–31)
MCH RBC QN AUTO: 32 PG (ref 27–31)
MCH RBC QN AUTO: 32.1 PG (ref 27–31)
MCH RBC QN AUTO: 32.1 PG (ref 27–31)
MCH RBC QN AUTO: 32.2 PG (ref 27–31)
MCH RBC QN AUTO: 32.3 PG (ref 27–31)
MCH RBC QN AUTO: 32.4 PG (ref 27–31)
MCH RBC QN AUTO: 32.4 PG (ref 27–31)
MCH RBC QN AUTO: 32.7 PG (ref 27–31)
MCHC RBC AUTO-ENTMCNC: 30.5 G/DL (ref 32–36)
MCHC RBC AUTO-ENTMCNC: 30.9 G/DL (ref 32–36)
MCHC RBC AUTO-ENTMCNC: 31.2 G/DL (ref 32–36)
MCHC RBC AUTO-ENTMCNC: 31.2 G/DL (ref 32–36)
MCHC RBC AUTO-ENTMCNC: 31.3 G/DL (ref 32–36)
MCHC RBC AUTO-ENTMCNC: 31.4 G/DL (ref 32–36)
MCHC RBC AUTO-ENTMCNC: 31.4 G/DL (ref 32–36)
MCHC RBC AUTO-ENTMCNC: 31.5 G/DL (ref 32–36)
MCHC RBC AUTO-ENTMCNC: 31.6 G/DL (ref 32–36)
MCHC RBC AUTO-ENTMCNC: 31.6 G/DL (ref 32–36)
MCHC RBC AUTO-ENTMCNC: 31.7 G/DL (ref 32–36)
MCHC RBC AUTO-ENTMCNC: 31.7 G/DL (ref 32–36)
MCHC RBC AUTO-ENTMCNC: 31.8 G/DL (ref 32–36)
MCHC RBC AUTO-ENTMCNC: 31.9 G/DL (ref 32–36)
MCHC RBC AUTO-ENTMCNC: 31.9 G/DL (ref 32–36)
MCHC RBC AUTO-ENTMCNC: 32 G/DL (ref 32–36)
MCHC RBC AUTO-ENTMCNC: 32.1 G/DL (ref 32–36)
MCHC RBC AUTO-ENTMCNC: 32.5 G/DL (ref 32–36)
MCHC RBC AUTO-ENTMCNC: 32.6 G/DL (ref 32–36)
MCHC RBC AUTO-ENTMCNC: 32.7 G/DL (ref 32–36)
MCHC RBC AUTO-ENTMCNC: 32.7 G/DL (ref 32–36)
MCHC RBC AUTO-ENTMCNC: 32.8 G/DL (ref 32–36)
MCHC RBC AUTO-ENTMCNC: 32.9 G/DL (ref 32–36)
MCHC RBC AUTO-ENTMCNC: 32.9 G/DL (ref 32–36)
MCHC RBC AUTO-ENTMCNC: 33 G/DL (ref 32–36)
MCHC RBC AUTO-ENTMCNC: 33.1 G/DL (ref 32–36)
MCHC RBC AUTO-ENTMCNC: 33.1 G/DL (ref 32–36)
MCHC RBC AUTO-ENTMCNC: 33.2 G/DL (ref 32–36)
MCHC RBC AUTO-ENTMCNC: 33.2 G/DL (ref 32–36)
MCHC RBC AUTO-ENTMCNC: 33.3 G/DL (ref 32–36)
MCV RBC AUTO: 100 FL (ref 82–98)
MCV RBC AUTO: 101 FL (ref 82–98)
MCV RBC AUTO: 102 FL (ref 82–98)
MCV RBC AUTO: 103 FL (ref 82–98)
MCV RBC AUTO: 104 FL (ref 82–98)
MCV RBC AUTO: 94 FL (ref 82–98)
MCV RBC AUTO: 95 FL (ref 82–98)
MCV RBC AUTO: 96 FL (ref 82–98)
MCV RBC AUTO: 97 FL (ref 82–98)
MCV RBC AUTO: 98 FL (ref 82–98)
MCV RBC AUTO: 99 FL (ref 82–98)
MICROSCOPIC COMMENT: ABNORMAL
MONOCYTES # BLD AUTO: 0.1 K/UL (ref 0.3–1)
MONOCYTES # BLD AUTO: 0.1 K/UL (ref 0.3–1)
MONOCYTES # BLD AUTO: 0.3 K/UL (ref 0.3–1)
MONOCYTES # BLD AUTO: 0.4 K/UL (ref 0.3–1)
MONOCYTES # BLD AUTO: 0.5 K/UL (ref 0.3–1)
MONOCYTES # BLD AUTO: 0.6 K/UL (ref 0.3–1)
MONOCYTES # BLD AUTO: 0.7 K/UL (ref 0.3–1)
MONOCYTES # BLD AUTO: 0.8 K/UL (ref 0.3–1)
MONOCYTES # BLD AUTO: 0.9 K/UL (ref 0.3–1)
MONOCYTES # BLD AUTO: 1 K/UL (ref 0.3–1)
MONOCYTES # BLD AUTO: 1.1 K/UL (ref 0.3–1)
MONOCYTES NFR BLD: 1.3 % (ref 4–15)
MONOCYTES NFR BLD: 10.1 % (ref 4–15)
MONOCYTES NFR BLD: 10.2 % (ref 4–15)
MONOCYTES NFR BLD: 10.3 % (ref 4–15)
MONOCYTES NFR BLD: 10.4 % (ref 4–15)
MONOCYTES NFR BLD: 10.7 % (ref 4–15)
MONOCYTES NFR BLD: 10.8 % (ref 4–15)
MONOCYTES NFR BLD: 11 % (ref 4–15)
MONOCYTES NFR BLD: 11.1 % (ref 4–15)
MONOCYTES NFR BLD: 11.2 % (ref 4–15)
MONOCYTES NFR BLD: 12.1 % (ref 4–15)
MONOCYTES NFR BLD: 12.2 % (ref 4–15)
MONOCYTES NFR BLD: 2.3 % (ref 4–15)
MONOCYTES NFR BLD: 3.6 % (ref 4–15)
MONOCYTES NFR BLD: 4.8 % (ref 4–15)
MONOCYTES NFR BLD: 6.3 % (ref 4–15)
MONOCYTES NFR BLD: 8.2 % (ref 4–15)
MONOCYTES NFR BLD: 8.4 % (ref 4–15)
MONOCYTES NFR BLD: 8.5 % (ref 4–15)
MONOCYTES NFR BLD: 8.7 % (ref 4–15)
MONOCYTES NFR BLD: 8.8 % (ref 4–15)
MONOCYTES NFR BLD: 9 % (ref 4–15)
MONOCYTES NFR BLD: 9.1 % (ref 4–15)
MONOCYTES NFR BLD: 9.2 % (ref 4–15)
MONOCYTES NFR BLD: 9.3 % (ref 4–15)
MONOCYTES NFR BLD: 9.3 % (ref 4–15)
MONOCYTES NFR BLD: 9.4 % (ref 4–15)
MONOCYTES NFR BLD: 9.4 % (ref 4–15)
MONOCYTES NFR BLD: 9.6 % (ref 4–15)
MONOCYTES NFR BLD: 9.7 % (ref 4–15)
MONOCYTES NFR BLD: 9.8 % (ref 4–15)
MV PEAK A VEL: 1.67 M/S
MV PEAK A VEL: 2.01 M/S
MV PEAK E VEL: 1.65 M/S
MV PEAK E VEL: 1.84 M/S
NEUTROPHILS # BLD AUTO: 13 K/UL (ref 1.8–7.7)
NEUTROPHILS # BLD AUTO: 3.4 K/UL (ref 1.8–7.7)
NEUTROPHILS # BLD AUTO: 3.4 K/UL (ref 1.8–7.7)
NEUTROPHILS # BLD AUTO: 3.5 K/UL (ref 1.8–7.7)
NEUTROPHILS # BLD AUTO: 3.5 K/UL (ref 1.8–7.7)
NEUTROPHILS # BLD AUTO: 3.6 K/UL (ref 1.8–7.7)
NEUTROPHILS # BLD AUTO: 3.8 K/UL (ref 1.8–7.7)
NEUTROPHILS # BLD AUTO: 3.9 K/UL (ref 1.8–7.7)
NEUTROPHILS # BLD AUTO: 4 K/UL (ref 1.8–7.7)
NEUTROPHILS # BLD AUTO: 4 K/UL (ref 1.8–7.7)
NEUTROPHILS # BLD AUTO: 4.1 K/UL (ref 1.8–7.7)
NEUTROPHILS # BLD AUTO: 4.2 K/UL (ref 1.8–7.7)
NEUTROPHILS # BLD AUTO: 4.2 K/UL (ref 1.8–7.7)
NEUTROPHILS # BLD AUTO: 4.3 K/UL (ref 1.8–7.7)
NEUTROPHILS # BLD AUTO: 4.5 K/UL (ref 1.8–7.7)
NEUTROPHILS # BLD AUTO: 4.7 K/UL (ref 1.8–7.7)
NEUTROPHILS # BLD AUTO: 4.7 K/UL (ref 1.8–7.7)
NEUTROPHILS # BLD AUTO: 4.9 K/UL (ref 1.8–7.7)
NEUTROPHILS # BLD AUTO: 4.9 K/UL (ref 1.8–7.7)
NEUTROPHILS # BLD AUTO: 5.1 K/UL (ref 1.8–7.7)
NEUTROPHILS # BLD AUTO: 5.6 K/UL (ref 1.8–7.7)
NEUTROPHILS # BLD AUTO: 5.7 K/UL (ref 1.8–7.7)
NEUTROPHILS # BLD AUTO: 5.9 K/UL (ref 1.8–7.7)
NEUTROPHILS # BLD AUTO: 6.1 K/UL (ref 1.8–7.7)
NEUTROPHILS # BLD AUTO: 6.1 K/UL (ref 1.8–7.7)
NEUTROPHILS # BLD AUTO: 7.2 K/UL (ref 1.8–7.7)
NEUTROPHILS # BLD AUTO: 7.3 K/UL (ref 1.8–7.7)
NEUTROPHILS # BLD AUTO: 7.9 K/UL (ref 1.8–7.7)
NEUTROPHILS # BLD AUTO: 8.3 K/UL (ref 1.8–7.7)
NEUTROPHILS NFR BLD: 54.4 % (ref 38–73)
NEUTROPHILS NFR BLD: 56.9 % (ref 38–73)
NEUTROPHILS NFR BLD: 57.8 % (ref 38–73)
NEUTROPHILS NFR BLD: 59.5 % (ref 38–73)
NEUTROPHILS NFR BLD: 60.6 % (ref 38–73)
NEUTROPHILS NFR BLD: 60.9 % (ref 38–73)
NEUTROPHILS NFR BLD: 61 % (ref 38–73)
NEUTROPHILS NFR BLD: 61 % (ref 38–73)
NEUTROPHILS NFR BLD: 62.1 % (ref 38–73)
NEUTROPHILS NFR BLD: 64.8 % (ref 38–73)
NEUTROPHILS NFR BLD: 65.2 % (ref 38–73)
NEUTROPHILS NFR BLD: 66.3 % (ref 38–73)
NEUTROPHILS NFR BLD: 67 % (ref 38–73)
NEUTROPHILS NFR BLD: 67.3 % (ref 38–73)
NEUTROPHILS NFR BLD: 68.4 % (ref 38–73)
NEUTROPHILS NFR BLD: 68.9 % (ref 38–73)
NEUTROPHILS NFR BLD: 69.2 % (ref 38–73)
NEUTROPHILS NFR BLD: 69.4 % (ref 38–73)
NEUTROPHILS NFR BLD: 70.2 % (ref 38–73)
NEUTROPHILS NFR BLD: 70.4 % (ref 38–73)
NEUTROPHILS NFR BLD: 70.9 % (ref 38–73)
NEUTROPHILS NFR BLD: 71.3 % (ref 38–73)
NEUTROPHILS NFR BLD: 71.5 % (ref 38–73)
NEUTROPHILS NFR BLD: 72.7 % (ref 38–73)
NEUTROPHILS NFR BLD: 73.8 % (ref 38–73)
NEUTROPHILS NFR BLD: 75.4 % (ref 38–73)
NEUTROPHILS NFR BLD: 79.2 % (ref 38–73)
NEUTROPHILS NFR BLD: 81.9 % (ref 38–73)
NEUTROPHILS NFR BLD: 83.9 % (ref 38–73)
NEUTROPHILS NFR BLD: 84.5 % (ref 38–73)
NEUTROPHILS NFR BLD: 85.3 % (ref 38–73)
NITRITE UR QL STRIP: NEGATIVE
NRBC BLD-RTO: 0 /100 WBC
OB PNL STL: NEGATIVE
PH UR STRIP: 6 [PH] (ref 5–8)
PH UR STRIP: 7 [PH] (ref 5–8)
PH UR STRIP: 7 [PH] (ref 5–8)
PH, POC UA: 5.5
PHOSPHATE SERPL-MCNC: 3.2 MG/DL (ref 2.7–4.5)
PHOSPHATE SERPL-MCNC: 3.4 MG/DL (ref 2.7–4.5)
PHOSPHATE SERPL-MCNC: 3.8 MG/DL (ref 2.7–4.5)
PHOSPHATE SERPL-MCNC: 4.1 MG/DL (ref 2.7–4.5)
PHOSPHATE SERPL-MCNC: 4.6 MG/DL (ref 2.7–4.5)
PHOSPHATE SERPL-MCNC: 4.8 MG/DL (ref 2.7–4.5)
PISA MRMAX VEL: 505.19 M/S
PISA TR MAX VEL: 3.15 M/S
PISA TR MAX VEL: 4.04 M/S
PLATELET # BLD AUTO: 131 K/UL (ref 150–450)
PLATELET # BLD AUTO: 142 K/UL (ref 150–450)
PLATELET # BLD AUTO: 143 K/UL (ref 150–450)
PLATELET # BLD AUTO: 145 K/UL (ref 150–450)
PLATELET # BLD AUTO: 146 K/UL (ref 150–450)
PLATELET # BLD AUTO: 147 K/UL (ref 150–450)
PLATELET # BLD AUTO: 150 K/UL (ref 150–450)
PLATELET # BLD AUTO: 150 K/UL (ref 150–450)
PLATELET # BLD AUTO: 152 K/UL (ref 150–450)
PLATELET # BLD AUTO: 155 K/UL (ref 150–450)
PLATELET # BLD AUTO: 156 K/UL (ref 150–450)
PLATELET # BLD AUTO: 156 K/UL (ref 150–450)
PLATELET # BLD AUTO: 159 K/UL (ref 150–450)
PLATELET # BLD AUTO: 160 K/UL (ref 150–450)
PLATELET # BLD AUTO: 163 K/UL (ref 150–450)
PLATELET # BLD AUTO: 164 K/UL (ref 150–450)
PLATELET # BLD AUTO: 164 K/UL (ref 150–450)
PLATELET # BLD AUTO: 166 K/UL (ref 150–450)
PLATELET # BLD AUTO: 167 K/UL (ref 150–450)
PLATELET # BLD AUTO: 174 K/UL (ref 150–450)
PLATELET # BLD AUTO: 176 K/UL (ref 150–450)
PLATELET # BLD AUTO: 177 K/UL (ref 150–450)
PLATELET # BLD AUTO: 183 K/UL (ref 150–450)
PLATELET # BLD AUTO: 186 K/UL (ref 150–450)
PLATELET # BLD AUTO: 193 K/UL (ref 150–450)
PLATELET # BLD AUTO: 196 K/UL (ref 150–450)
PLATELET # BLD AUTO: 201 K/UL (ref 150–450)
PLATELET # BLD AUTO: 207 K/UL (ref 150–450)
PLATELET # BLD AUTO: 209 K/UL (ref 150–450)
PLATELET # BLD AUTO: 220 K/UL (ref 150–450)
PLATELET # BLD AUTO: 222 K/UL (ref 150–450)
PLATELET # BLD AUTO: 224 K/UL (ref 150–450)
PLATELET # BLD AUTO: 226 K/UL (ref 150–450)
PLATELET # BLD AUTO: 241 K/UL (ref 150–450)
PLATELET # BLD AUTO: 253 K/UL (ref 150–450)
PMV BLD AUTO: 10.1 FL (ref 9.2–12.9)
PMV BLD AUTO: 10.4 FL (ref 9.2–12.9)
PMV BLD AUTO: 10.5 FL (ref 9.2–12.9)
PMV BLD AUTO: 10.6 FL (ref 9.2–12.9)
PMV BLD AUTO: 10.7 FL (ref 9.2–12.9)
PMV BLD AUTO: 10.7 FL (ref 9.2–12.9)
PMV BLD AUTO: 10.8 FL (ref 9.2–12.9)
PMV BLD AUTO: 10.9 FL (ref 9.2–12.9)
PMV BLD AUTO: 11 FL (ref 9.2–12.9)
PMV BLD AUTO: 11.1 FL (ref 9.2–12.9)
PMV BLD AUTO: 11.3 FL (ref 9.2–12.9)
PMV BLD AUTO: 11.3 FL (ref 9.2–12.9)
PMV BLD AUTO: 11.4 FL (ref 9.2–12.9)
PMV BLD AUTO: 11.5 FL (ref 9.2–12.9)
PMV BLD AUTO: 11.6 FL (ref 9.2–12.9)
PMV BLD AUTO: 11.6 FL (ref 9.2–12.9)
PMV BLD AUTO: 11.7 FL (ref 9.2–12.9)
PMV BLD AUTO: 12.3 FL (ref 9.2–12.9)
PMV BLD AUTO: 12.3 FL (ref 9.2–12.9)
PMV BLD AUTO: 9.4 FL (ref 9.2–12.9)
PMV BLD AUTO: 9.7 FL (ref 9.2–12.9)
PMV BLD AUTO: 9.8 FL (ref 9.2–12.9)
PMV BLD AUTO: 9.9 FL (ref 9.2–12.9)
POC BLOOD, URINE: NEGATIVE
POC NITRATES, URINE: NEGATIVE
POTASSIUM SERPL-SCNC: 3.7 MMOL/L (ref 3.5–5.1)
POTASSIUM SERPL-SCNC: 3.8 MMOL/L (ref 3.5–5.1)
POTASSIUM SERPL-SCNC: 3.9 MMOL/L (ref 3.5–5.1)
POTASSIUM SERPL-SCNC: 4 MMOL/L (ref 3.5–5.1)
POTASSIUM SERPL-SCNC: 4.1 MMOL/L (ref 3.5–5.1)
POTASSIUM SERPL-SCNC: 4.1 MMOL/L (ref 3.5–5.1)
POTASSIUM SERPL-SCNC: 4.2 MMOL/L (ref 3.5–5.1)
POTASSIUM SERPL-SCNC: 4.4 MMOL/L (ref 3.5–5.1)
POTASSIUM SERPL-SCNC: 4.5 MMOL/L (ref 3.5–5.1)
POTASSIUM SERPL-SCNC: 4.6 MMOL/L (ref 3.5–5.1)
POTASSIUM SERPL-SCNC: 4.6 MMOL/L (ref 3.5–5.1)
POTASSIUM SERPL-SCNC: 4.7 MMOL/L (ref 3.5–5.1)
POTASSIUM SERPL-SCNC: 4.8 MMOL/L (ref 3.5–5.1)
POTASSIUM SERPL-SCNC: 5 MMOL/L (ref 3.5–5.1)
POTASSIUM SERPL-SCNC: 5 MMOL/L (ref 3.5–5.1)
POTASSIUM SERPL-SCNC: 5.1 MMOL/L (ref 3.5–5.1)
POTASSIUM SERPL-SCNC: 5.2 MMOL/L (ref 3.5–5.1)
POTASSIUM SERPL-SCNC: 5.5 MMOL/L (ref 3.5–5.1)
POTASSIUM SERPL-SCNC: 5.5 MMOL/L (ref 3.5–5.1)
POTASSIUM SERPL-SCNC: 5.8 MMOL/L (ref 3.5–5.1)
PROCALCITONIN SERPL IA-MCNC: 0.18 NG/ML (ref 0–0.5)
PROT SERPL-MCNC: 5.1 G/DL (ref 6–8.4)
PROT SERPL-MCNC: 5.2 G/DL (ref 6–8.4)
PROT SERPL-MCNC: 5.3 G/DL (ref 6–8.4)
PROT SERPL-MCNC: 5.4 G/DL (ref 6–8.4)
PROT SERPL-MCNC: 5.5 G/DL (ref 6–8.4)
PROT SERPL-MCNC: 5.5 G/DL (ref 6–8.4)
PROT SERPL-MCNC: 5.6 G/DL (ref 6–8.4)
PROT SERPL-MCNC: 5.7 G/DL (ref 6–8.4)
PROT SERPL-MCNC: 5.8 G/DL (ref 6–8.4)
PROT SERPL-MCNC: 5.9 G/DL (ref 6–8.4)
PROT SERPL-MCNC: 5.9 G/DL (ref 6–8.4)
PROT SERPL-MCNC: 6 G/DL (ref 6–8.4)
PROT SERPL-MCNC: 6.1 G/DL (ref 6–8.4)
PROT SERPL-MCNC: 6.1 G/DL (ref 6–8.4)
PROT SERPL-MCNC: 6.2 G/DL (ref 6–8.4)
PROT SERPL-MCNC: 6.2 G/DL (ref 6–8.4)
PROT UR QL STRIP: ABNORMAL
PROT UR QL STRIP: POSITIVE
PROT UR-MCNC: 640 MG/DL (ref 6–15)
PROT UR-MCNC: ABNORMAL MG/DL (ref 6–15)
PROT/CREAT UR: 6.81 MG/G{CREAT} (ref 0–0.2)
PV PEAK VELOCITY: 93.87 CM/S
RA PRESSURE: 3 MMHG
RBC # BLD AUTO: 2.39 M/UL (ref 4–5.4)
RBC # BLD AUTO: 2.44 M/UL (ref 4–5.4)
RBC # BLD AUTO: 2.57 M/UL (ref 4–5.4)
RBC # BLD AUTO: 2.64 M/UL (ref 4–5.4)
RBC # BLD AUTO: 2.64 M/UL (ref 4–5.4)
RBC # BLD AUTO: 2.71 M/UL (ref 4–5.4)
RBC # BLD AUTO: 2.72 M/UL (ref 4–5.4)
RBC # BLD AUTO: 2.77 M/UL (ref 4–5.4)
RBC # BLD AUTO: 2.78 M/UL (ref 4–5.4)
RBC # BLD AUTO: 2.79 M/UL (ref 4–5.4)
RBC # BLD AUTO: 2.79 M/UL (ref 4–5.4)
RBC # BLD AUTO: 2.8 M/UL (ref 4–5.4)
RBC # BLD AUTO: 2.82 M/UL (ref 4–5.4)
RBC # BLD AUTO: 2.85 M/UL (ref 4–5.4)
RBC # BLD AUTO: 2.86 M/UL (ref 4–5.4)
RBC # BLD AUTO: 2.88 M/UL (ref 4–5.4)
RBC # BLD AUTO: 2.89 M/UL (ref 4–5.4)
RBC # BLD AUTO: 2.92 M/UL (ref 4–5.4)
RBC # BLD AUTO: 2.94 M/UL (ref 4–5.4)
RBC # BLD AUTO: 2.94 M/UL (ref 4–5.4)
RBC # BLD AUTO: 2.97 M/UL (ref 4–5.4)
RBC # BLD AUTO: 2.99 M/UL (ref 4–5.4)
RBC # BLD AUTO: 3 M/UL (ref 4–5.4)
RBC # BLD AUTO: 3 M/UL (ref 4–5.4)
RBC # BLD AUTO: 3.02 M/UL (ref 4–5.4)
RBC # BLD AUTO: 3.04 M/UL (ref 4–5.4)
RBC # BLD AUTO: 3.05 M/UL (ref 4–5.4)
RBC # BLD AUTO: 3.09 M/UL (ref 4–5.4)
RBC # BLD AUTO: 3.1 M/UL (ref 4–5.4)
RBC # BLD AUTO: 3.11 M/UL (ref 4–5.4)
RBC # BLD AUTO: 3.14 M/UL (ref 4–5.4)
RBC # BLD AUTO: 3.16 M/UL (ref 4–5.4)
RBC # BLD AUTO: 3.17 M/UL (ref 4–5.4)
RBC # BLD AUTO: 3.26 M/UL (ref 4–5.4)
RBC # BLD AUTO: 3.28 M/UL (ref 4–5.4)
RBC # BLD AUTO: 3.34 M/UL (ref 4–5.4)
RBC #/AREA URNS HPF: 1 /HPF (ref 0–4)
RBC #/AREA URNS HPF: 2 /HPF (ref 0–4)
RIGHT VENTRICULAR END-DIASTOLIC DIMENSION: 190 CM
SARS-COV-2 RDRP RESP QL NAA+PROBE: NEGATIVE
SATURATED IRON: 17 % (ref 20–50)
SATURATED IRON: 20 % (ref 20–50)
SATURATED IRON: 23 % (ref 20–50)
SATURATED IRON: 33 % (ref 20–50)
SATURATED IRON: 33 % (ref 20–50)
SATURATED IRON: 59 % (ref 20–50)
SODIUM SERPL-SCNC: 134 MMOL/L (ref 136–145)
SODIUM SERPL-SCNC: 136 MMOL/L (ref 136–145)
SODIUM SERPL-SCNC: 136 MMOL/L (ref 136–145)
SODIUM SERPL-SCNC: 137 MMOL/L (ref 136–145)
SODIUM SERPL-SCNC: 138 MMOL/L (ref 136–145)
SODIUM SERPL-SCNC: 139 MMOL/L (ref 136–145)
SODIUM SERPL-SCNC: 140 MMOL/L (ref 136–145)
SODIUM SERPL-SCNC: 141 MMOL/L (ref 136–145)
SODIUM SERPL-SCNC: 141 MMOL/L (ref 136–145)
SODIUM SERPL-SCNC: 142 MMOL/L (ref 136–145)
SODIUM SERPL-SCNC: 143 MMOL/L (ref 136–145)
SODIUM SERPL-SCNC: 145 MMOL/L (ref 136–145)
SP GR UR STRIP: 1.01 (ref 1–1.03)
SP GR UR STRIP: 1.02 (ref 1–1.03)
SQUAMOUS #/AREA URNS HPF: 1 /HPF
SQUAMOUS #/AREA URNS HPF: 2 /HPF
SQUAMOUS #/AREA URNS HPF: 4 /HPF
SQUAMOUS #/AREA URNS HPF: 5 /HPF
SQUAMOUS #/AREA URNS HPF: 5 /HPF
T4 FREE SERPL-MCNC: 0.57 NG/DL (ref 0.71–1.51)
T4 FREE SERPL-MCNC: 1.01 NG/DL (ref 0.71–1.51)
T4 FREE SERPL-MCNC: 1.15 NG/DL (ref 0.71–1.51)
TDI LATERAL: 0.04 M/S
TDI LATERAL: 0.06 M/S
TDI SEPTAL: 0.04 M/S
TDI SEPTAL: 0.04 M/S
TDI: 0.04 M/S
TDI: 0.05 M/S
TOTAL IRON BINDING CAPACITY: 195 UG/DL (ref 250–450)
TOTAL IRON BINDING CAPACITY: 203 UG/DL (ref 250–450)
TOTAL IRON BINDING CAPACITY: 218 UG/DL (ref 250–450)
TOTAL IRON BINDING CAPACITY: 224 UG/DL (ref 250–450)
TOTAL IRON BINDING CAPACITY: 239 UG/DL (ref 250–450)
TOTAL IRON BINDING CAPACITY: 241 UG/DL (ref 250–450)
TR MAX PG: 40 MMHG
TR MAX PG: 65 MMHG
TRANSFERRIN SERPL-MCNC: 139 MG/DL (ref 200–375)
TRANSFERRIN SERPL-MCNC: 145 MG/DL (ref 200–375)
TRANSFERRIN SERPL-MCNC: 156 MG/DL (ref 200–375)
TRANSFERRIN SERPL-MCNC: 160 MG/DL (ref 200–375)
TRANSFERRIN SERPL-MCNC: 171 MG/DL (ref 200–375)
TRANSFERRIN SERPL-MCNC: 172 MG/DL (ref 200–375)
TRICUSPID ANNULAR PLANE SYSTOLIC EXCURSION: 2.36 CM
TROPONIN I SERPL DL<=0.01 NG/ML-MCNC: 0.05 NG/ML
TROPONIN I SERPL DL<=0.01 NG/ML-MCNC: 0.06 NG/ML
TROPONIN I SERPL DL<=0.01 NG/ML-MCNC: 0.39 NG/ML
TROPONIN I SERPL DL<=0.01 NG/ML-MCNC: 0.4 NG/ML
TROPONIN I SERPL DL<=0.01 NG/ML-MCNC: 0.75 NG/ML
TROPONIN I SERPL DL<=0.01 NG/ML-MCNC: 0.79 NG/ML
TROPONIN I SERPL DL<=0.01 NG/ML-MCNC: 1.37 NG/ML
TROPONIN I SERPL DL<=0.01 NG/ML-MCNC: <0.03 NG/ML
TSH SERPL DL<=0.005 MIU/L-ACNC: 0.2 UIU/ML (ref 0.34–5.6)
TSH SERPL DL<=0.005 MIU/L-ACNC: 14.22 UIU/ML (ref 0.34–5.6)
TSH SERPL DL<=0.005 MIU/L-ACNC: 2.51 UIU/ML (ref 0.34–5.6)
TSH SERPL DL<=0.005 MIU/L-ACNC: 2.7 UIU/ML (ref 0.4–4)
TV REST PULMONARY ARTERY PRESSURE: 43 MMHG
URN SPEC COLLECT METH UR: ABNORMAL
UROBILINOGEN UR STRIP-ACNC: ABNORMAL (ref 0.1–1.1)
UROBILINOGEN UR STRIP-ACNC: NEGATIVE EU/DL
WBC # BLD AUTO: 10.1 K/UL (ref 3.9–12.7)
WBC # BLD AUTO: 11.05 K/UL (ref 3.9–12.7)
WBC # BLD AUTO: 12.35 K/UL (ref 3.9–12.7)
WBC # BLD AUTO: 15.25 K/UL (ref 3.9–12.7)
WBC # BLD AUTO: 5.2 K/UL (ref 3.9–12.7)
WBC # BLD AUTO: 5.69 K/UL (ref 3.9–12.7)
WBC # BLD AUTO: 5.71 K/UL (ref 3.9–12.7)
WBC # BLD AUTO: 5.9 K/UL (ref 3.9–12.7)
WBC # BLD AUTO: 5.91 K/UL (ref 3.9–12.7)
WBC # BLD AUTO: 5.95 K/UL (ref 3.9–12.7)
WBC # BLD AUTO: 5.99 K/UL (ref 3.9–12.7)
WBC # BLD AUTO: 6.12 K/UL (ref 3.9–12.7)
WBC # BLD AUTO: 6.14 K/UL (ref 3.9–12.7)
WBC # BLD AUTO: 6.15 K/UL (ref 3.9–12.7)
WBC # BLD AUTO: 6.15 K/UL (ref 3.9–12.7)
WBC # BLD AUTO: 6.16 K/UL (ref 3.9–12.7)
WBC # BLD AUTO: 6.25 K/UL (ref 3.9–12.7)
WBC # BLD AUTO: 6.25 K/UL (ref 3.9–12.7)
WBC # BLD AUTO: 6.3 K/UL (ref 3.9–12.7)
WBC # BLD AUTO: 6.37 K/UL (ref 3.9–12.7)
WBC # BLD AUTO: 6.63 K/UL (ref 3.9–12.7)
WBC # BLD AUTO: 6.65 K/UL (ref 3.9–12.7)
WBC # BLD AUTO: 6.79 K/UL (ref 3.9–12.7)
WBC # BLD AUTO: 6.88 K/UL (ref 3.9–12.7)
WBC # BLD AUTO: 6.89 K/UL (ref 3.9–12.7)
WBC # BLD AUTO: 7.07 K/UL (ref 3.9–12.7)
WBC # BLD AUTO: 7.14 K/UL (ref 3.9–12.7)
WBC # BLD AUTO: 7.27 K/UL (ref 3.9–12.7)
WBC # BLD AUTO: 7.78 K/UL (ref 3.9–12.7)
WBC # BLD AUTO: 8 K/UL (ref 3.9–12.7)
WBC # BLD AUTO: 8.02 K/UL (ref 3.9–12.7)
WBC # BLD AUTO: 8.25 K/UL (ref 3.9–12.7)
WBC # BLD AUTO: 8.59 K/UL (ref 3.9–12.7)
WBC # BLD AUTO: 8.67 K/UL (ref 3.9–12.7)
WBC # BLD AUTO: 8.9 K/UL (ref 3.9–12.7)
WBC # BLD AUTO: 9.03 K/UL (ref 3.9–12.7)
WBC # BLD AUTO: 9.77 K/UL (ref 3.9–12.7)
WBC # BLD AUTO: 9.94 K/UL (ref 3.9–12.7)
WBC #/AREA URNS HPF: 16 /HPF (ref 0–5)
WBC #/AREA URNS HPF: 17 /HPF (ref 0–5)
WBC #/AREA URNS HPF: 3 /HPF (ref 0–5)
WBC #/AREA URNS HPF: 9 /HPF (ref 0–5)
YEAST URNS QL MICRO: ABNORMAL

## 2022-01-01 PROCEDURE — 1159F MED LIST DOCD IN RCRD: CPT | Mod: CPTII,S$GLB,, | Performed by: STUDENT IN AN ORGANIZED HEALTH CARE EDUCATION/TRAINING PROGRAM

## 2022-01-01 PROCEDURE — 80048 BASIC METABOLIC PNL TOTAL CA: CPT | Performed by: INTERNAL MEDICINE

## 2022-01-01 PROCEDURE — 27000221 HC OXYGEN, UP TO 24 HOURS

## 2022-01-01 PROCEDURE — 94618 PULMONARY STRESS TESTING: CPT

## 2022-01-01 PROCEDURE — 1160F RVW MEDS BY RX/DR IN RCRD: CPT | Mod: CPTII,S$GLB,, | Performed by: OPTOMETRIST

## 2022-01-01 PROCEDURE — 99232 PR SUBSEQUENT HOSPITAL CARE,LEVL II: ICD-10-PCS | Mod: ,,, | Performed by: INTERNAL MEDICINE

## 2022-01-01 PROCEDURE — 1160F RVW MEDS BY RX/DR IN RCRD: CPT | Mod: CPTII,S$GLB,, | Performed by: NURSE PRACTITIONER

## 2022-01-01 PROCEDURE — 99214 PR OFFICE/OUTPT VISIT, EST, LEVL IV, 30-39 MIN: ICD-10-PCS | Mod: S$GLB,,, | Performed by: INTERNAL MEDICINE

## 2022-01-01 PROCEDURE — 93005 ELECTROCARDIOGRAM TRACING: CPT | Performed by: SPECIALIST

## 2022-01-01 PROCEDURE — 83880 ASSAY OF NATRIURETIC PEPTIDE: CPT | Performed by: EMERGENCY MEDICINE

## 2022-01-01 PROCEDURE — 84443 ASSAY THYROID STIM HORMONE: CPT | Performed by: FAMILY MEDICINE

## 2022-01-01 PROCEDURE — 21000000 HC CCU ICU ROOM CHARGE

## 2022-01-01 PROCEDURE — G0378 HOSPITAL OBSERVATION PER HR: HCPCS

## 2022-01-01 PROCEDURE — 25000242 PHARM REV CODE 250 ALT 637 W/ HCPCS: Performed by: INTERNAL MEDICINE

## 2022-01-01 PROCEDURE — 99203 OFFICE O/P NEW LOW 30 MIN: CPT | Mod: 25,S$GLB,, | Performed by: STUDENT IN AN ORGANIZED HEALTH CARE EDUCATION/TRAINING PROGRAM

## 2022-01-01 PROCEDURE — 99499 RISK ADDL DX/OHS AUDIT: ICD-10-PCS | Mod: S$GLB,,, | Performed by: STUDENT IN AN ORGANIZED HEALTH CARE EDUCATION/TRAINING PROGRAM

## 2022-01-01 PROCEDURE — 99900035 HC TECH TIME PER 15 MIN (STAT)

## 2022-01-01 PROCEDURE — 1101F PR PT FALLS ASSESS DOC 0-1 FALLS W/OUT INJ PAST YR: ICD-10-PCS | Mod: CPTII,S$GLB,, | Performed by: NURSE PRACTITIONER

## 2022-01-01 PROCEDURE — 25000003 PHARM REV CODE 250: Performed by: INTERNAL MEDICINE

## 2022-01-01 PROCEDURE — 1160F PR REVIEW ALL MEDS BY PRESCRIBER/CLIN PHARMACIST DOCUMENTED: ICD-10-PCS | Mod: CPTII,S$GLB,, | Performed by: FAMILY MEDICINE

## 2022-01-01 PROCEDURE — 25000003 PHARM REV CODE 250: Performed by: EMERGENCY MEDICINE

## 2022-01-01 PROCEDURE — 3288F FALL RISK ASSESSMENT DOCD: CPT | Mod: CPTII,S$GLB,, | Performed by: NURSE PRACTITIONER

## 2022-01-01 PROCEDURE — 85025 COMPLETE CBC W/AUTO DIFF WBC: CPT | Performed by: INTERNAL MEDICINE

## 2022-01-01 PROCEDURE — 99999 PR PBB SHADOW E&M-EST. PATIENT-LVL IV: CPT | Mod: PBBFAC,,, | Performed by: FAMILY MEDICINE

## 2022-01-01 PROCEDURE — 99900031 HC PATIENT EDUCATION (STAT)

## 2022-01-01 PROCEDURE — 81001 URINALYSIS AUTO W/SCOPE: CPT | Performed by: INTERNAL MEDICINE

## 2022-01-01 PROCEDURE — 94761 N-INVAS EAR/PLS OXIMETRY MLT: CPT

## 2022-01-01 PROCEDURE — 71250 CT THORAX DX C-: CPT | Mod: 26,,, | Performed by: RADIOLOGY

## 2022-01-01 PROCEDURE — 1160F PR REVIEW ALL MEDS BY PRESCRIBER/CLIN PHARMACIST DOCUMENTED: ICD-10-PCS | Mod: CPTII,S$GLB,, | Performed by: NURSE PRACTITIONER

## 2022-01-01 PROCEDURE — 83880 ASSAY OF NATRIURETIC PEPTIDE: CPT | Performed by: INTERNAL MEDICINE

## 2022-01-01 PROCEDURE — 97161 PT EVAL LOW COMPLEX 20 MIN: CPT

## 2022-01-01 PROCEDURE — 25000003 PHARM REV CODE 250: Performed by: NURSE PRACTITIONER

## 2022-01-01 PROCEDURE — 93010 EKG 12-LEAD: ICD-10-PCS | Mod: ,,, | Performed by: INTERNAL MEDICINE

## 2022-01-01 PROCEDURE — 84100 ASSAY OF PHOSPHORUS: CPT | Performed by: NURSE PRACTITIONER

## 2022-01-01 PROCEDURE — 87077 CULTURE AEROBIC IDENTIFY: CPT | Performed by: EMERGENCY MEDICINE

## 2022-01-01 PROCEDURE — 97535 SELF CARE MNGMENT TRAINING: CPT

## 2022-01-01 PROCEDURE — 97116 GAIT TRAINING THERAPY: CPT

## 2022-01-01 PROCEDURE — 94660 CPAP INITIATION&MGMT: CPT

## 2022-01-01 PROCEDURE — 1126F PR PAIN SEVERITY QUANTIFIED, NO PAIN PRESENT: ICD-10-PCS | Mod: CPTII,S$GLB,, | Performed by: FAMILY MEDICINE

## 2022-01-01 PROCEDURE — 1157F PR ADVANCE CARE PLAN OR EQUIV PRESENT IN MEDICAL RECORD: ICD-10-PCS | Mod: CPTII,S$GLB,, | Performed by: STUDENT IN AN ORGANIZED HEALTH CARE EDUCATION/TRAINING PROGRAM

## 2022-01-01 PROCEDURE — 96372 THER/PROPH/DIAG INJ SC/IM: CPT

## 2022-01-01 PROCEDURE — 93010 EKG 12-LEAD: ICD-10-PCS | Mod: ,,, | Performed by: SPECIALIST

## 2022-01-01 PROCEDURE — 1126F AMNT PAIN NOTED NONE PRSNT: CPT | Mod: CPTII,S$GLB,, | Performed by: FAMILY MEDICINE

## 2022-01-01 PROCEDURE — 97110 THERAPEUTIC EXERCISES: CPT | Mod: CQ

## 2022-01-01 PROCEDURE — 85025 COMPLETE CBC W/AUTO DIFF WBC: CPT | Performed by: NURSE PRACTITIONER

## 2022-01-01 PROCEDURE — 63600175 PHARM REV CODE 636 W HCPCS: Performed by: INTERNAL MEDICINE

## 2022-01-01 PROCEDURE — 99999 PR PBB SHADOW E&M-EST. PATIENT-LVL IV: CPT | Mod: PBBFAC,,, | Performed by: INTERNAL MEDICINE

## 2022-01-01 PROCEDURE — 99285 EMERGENCY DEPT VISIT HI MDM: CPT | Mod: 25

## 2022-01-01 PROCEDURE — 85025 COMPLETE CBC W/AUTO DIFF WBC: CPT | Performed by: EMERGENCY MEDICINE

## 2022-01-01 PROCEDURE — 63600175 PHARM REV CODE 636 W HCPCS: Performed by: EMERGENCY MEDICINE

## 2022-01-01 PROCEDURE — 99499 RISK ADDL DX/OHS AUDIT: ICD-10-PCS | Mod: S$GLB,,, | Performed by: INTERNAL MEDICINE

## 2022-01-01 PROCEDURE — 25000242 PHARM REV CODE 250 ALT 637 W/ HCPCS: Performed by: EMERGENCY MEDICINE

## 2022-01-01 PROCEDURE — 1159F PR MEDICATION LIST DOCUMENTED IN MEDICAL RECORD: ICD-10-PCS | Mod: CPTII,S$GLB,, | Performed by: NURSE PRACTITIONER

## 2022-01-01 PROCEDURE — 83735 ASSAY OF MAGNESIUM: CPT | Performed by: NURSE PRACTITIONER

## 2022-01-01 PROCEDURE — 99999 PR PBB SHADOW E&M-EST. PATIENT-LVL IV: ICD-10-PCS | Mod: PBBFAC,,, | Performed by: FAMILY MEDICINE

## 2022-01-01 PROCEDURE — 94640 AIRWAY INHALATION TREATMENT: CPT

## 2022-01-01 PROCEDURE — 1157F ADVNC CARE PLAN IN RCRD: CPT | Mod: CPTII,S$GLB,, | Performed by: INTERNAL MEDICINE

## 2022-01-01 PROCEDURE — 1159F PR MEDICATION LIST DOCUMENTED IN MEDICAL RECORD: ICD-10-PCS | Mod: CPTII,S$GLB,, | Performed by: PHYSICAL MEDICINE & REHABILITATION

## 2022-01-01 PROCEDURE — 80053 COMPREHEN METABOLIC PANEL: CPT | Performed by: INTERNAL MEDICINE

## 2022-01-01 PROCEDURE — 99214 PR OFFICE/OUTPT VISIT, EST, LEVL IV, 30-39 MIN: ICD-10-PCS | Mod: S$GLB,,, | Performed by: FAMILY MEDICINE

## 2022-01-01 PROCEDURE — 94799 UNLISTED PULMONARY SVC/PX: CPT

## 2022-01-01 PROCEDURE — 1157F ADVNC CARE PLAN IN RCRD: CPT | Mod: S$GLB,,, | Performed by: INTERNAL MEDICINE

## 2022-01-01 PROCEDURE — 3288F PR FALLS RISK ASSESSMENT DOCUMENTED: ICD-10-PCS | Mod: S$GLB,,, | Performed by: INTERNAL MEDICINE

## 2022-01-01 PROCEDURE — 96372 THER/PROPH/DIAG INJ SC/IM: CPT | Mod: S$GLB,,, | Performed by: PHYSICAL MEDICINE & REHABILITATION

## 2022-01-01 PROCEDURE — 3288F PR FALLS RISK ASSESSMENT DOCUMENTED: ICD-10-PCS | Mod: CPTII,S$GLB,, | Performed by: OPTOMETRIST

## 2022-01-01 PROCEDURE — 1101F PR PT FALLS ASSESS DOC 0-1 FALLS W/OUT INJ PAST YR: ICD-10-PCS | Mod: CPTII,S$GLB,, | Performed by: INTERNAL MEDICINE

## 2022-01-01 PROCEDURE — 1159F MED LIST DOCD IN RCRD: CPT | Mod: S$GLB,,, | Performed by: INTERNAL MEDICINE

## 2022-01-01 PROCEDURE — 99999 PR PBB SHADOW E&M-EST. PATIENT-LVL III: CPT | Mod: PBBFAC,,, | Performed by: PHYSICAL MEDICINE & REHABILITATION

## 2022-01-01 PROCEDURE — 1160F PR REVIEW ALL MEDS BY PRESCRIBER/CLIN PHARMACIST DOCUMENTED: ICD-10-PCS | Mod: S$GLB,,, | Performed by: INTERNAL MEDICINE

## 2022-01-01 PROCEDURE — 80048 BASIC METABOLIC PNL TOTAL CA: CPT | Performed by: NURSE PRACTITIONER

## 2022-01-01 PROCEDURE — 3072F PR LOW RISK FOR RETINOPATHY: ICD-10-PCS | Mod: CPTII,S$GLB,, | Performed by: STUDENT IN AN ORGANIZED HEALTH CARE EDUCATION/TRAINING PROGRAM

## 2022-01-01 PROCEDURE — 3288F PR FALLS RISK ASSESSMENT DOCUMENTED: ICD-10-PCS | Mod: CPTII,S$GLB,, | Performed by: STUDENT IN AN ORGANIZED HEALTH CARE EDUCATION/TRAINING PROGRAM

## 2022-01-01 PROCEDURE — 81003 POCT URINALYSIS, DIPSTICK, AUTOMATED, W/O SCOPE: ICD-10-PCS | Mod: QW,S$GLB,, | Performed by: NURSE PRACTITIONER

## 2022-01-01 PROCEDURE — 80053 COMPREHEN METABOLIC PANEL: CPT | Performed by: EMERGENCY MEDICINE

## 2022-01-01 PROCEDURE — 81003 URINALYSIS AUTO W/O SCOPE: CPT | Mod: QW,S$GLB,, | Performed by: NURSE PRACTITIONER

## 2022-01-01 PROCEDURE — 96366 THER/PROPH/DIAG IV INF ADDON: CPT

## 2022-01-01 PROCEDURE — 93306 TTE W/DOPPLER COMPLETE: CPT

## 2022-01-01 PROCEDURE — 96365 THER/PROPH/DIAG IV INF INIT: CPT

## 2022-01-01 PROCEDURE — 63600175 PHARM REV CODE 636 W HCPCS: Mod: JG,EC | Performed by: INTERNAL MEDICINE

## 2022-01-01 PROCEDURE — 21400001 HC TELEMETRY ROOM

## 2022-01-01 PROCEDURE — 1157F PR ADVANCE CARE PLAN OR EQUIV PRESENT IN MEDICAL RECORD: ICD-10-PCS | Mod: CPTII,S$GLB,, | Performed by: PHYSICAL MEDICINE & REHABILITATION

## 2022-01-01 PROCEDURE — 3288F PR FALLS RISK ASSESSMENT DOCUMENTED: ICD-10-PCS | Mod: CPTII,S$GLB,, | Performed by: PHYSICAL MEDICINE & REHABILITATION

## 2022-01-01 PROCEDURE — 99233 SBSQ HOSP IP/OBS HIGH 50: CPT | Mod: ,,, | Performed by: SPECIALIST

## 2022-01-01 PROCEDURE — 99499 RISK ADDL DX/OHS AUDIT: ICD-10-PCS | Mod: S$GLB,,, | Performed by: OPTOMETRIST

## 2022-01-01 PROCEDURE — 99233 PR SUBSEQUENT HOSPITAL CARE,LEVL III: ICD-10-PCS | Mod: ,,, | Performed by: SPECIALIST

## 2022-01-01 PROCEDURE — 3072F PR LOW RISK FOR RETINOPATHY: ICD-10-PCS | Mod: CPTII,S$GLB,, | Performed by: INTERNAL MEDICINE

## 2022-01-01 PROCEDURE — 1111F PR DISCHARGE MEDS RECONCILED W/ CURRENT OUTPATIENT MED LIST: ICD-10-PCS | Mod: CPTII,S$GLB,, | Performed by: NURSE PRACTITIONER

## 2022-01-01 PROCEDURE — 85027 COMPLETE CBC AUTOMATED: CPT | Performed by: INTERNAL MEDICINE

## 2022-01-01 PROCEDURE — 99999 PR PBB SHADOW E&M-EST. PATIENT-LVL II: ICD-10-PCS | Mod: PBBFAC,,, | Performed by: OPTOMETRIST

## 2022-01-01 PROCEDURE — 1126F AMNT PAIN NOTED NONE PRSNT: CPT | Mod: CPTII,S$GLB,, | Performed by: PHYSICAL MEDICINE & REHABILITATION

## 2022-01-01 PROCEDURE — 36415 COLL VENOUS BLD VENIPUNCTURE: CPT | Performed by: INTERNAL MEDICINE

## 2022-01-01 PROCEDURE — 3072F LOW RISK FOR RETINOPATHY: CPT | Mod: CPTII,S$GLB,, | Performed by: INTERNAL MEDICINE

## 2022-01-01 PROCEDURE — 1157F PR ADVANCE CARE PLAN OR EQUIV PRESENT IN MEDICAL RECORD: ICD-10-PCS | Mod: CPTII,S$GLB,, | Performed by: INTERNAL MEDICINE

## 2022-01-01 PROCEDURE — 93306 ECHO (CUPID ONLY): ICD-10-PCS | Mod: 26,,, | Performed by: INTERNAL MEDICINE

## 2022-01-01 PROCEDURE — 1126F AMNT PAIN NOTED NONE PRSNT: CPT | Mod: CPTII,S$GLB,, | Performed by: INTERNAL MEDICINE

## 2022-01-01 PROCEDURE — P9047 ALBUMIN (HUMAN), 25%, 50ML: HCPCS | Mod: JG | Performed by: NURSE PRACTITIONER

## 2022-01-01 PROCEDURE — 36415 COLL VENOUS BLD VENIPUNCTURE: CPT | Performed by: NURSE PRACTITIONER

## 2022-01-01 PROCEDURE — 96361 HYDRATE IV INFUSION ADD-ON: CPT

## 2022-01-01 PROCEDURE — 1157F ADVNC CARE PLAN IN RCRD: CPT | Mod: CPTII,S$GLB,, | Performed by: OPTOMETRIST

## 2022-01-01 PROCEDURE — 99213 OFFICE O/P EST LOW 20 MIN: CPT | Mod: S$GLB,,, | Performed by: INTERNAL MEDICINE

## 2022-01-01 PROCEDURE — 3072F LOW RISK FOR RETINOPATHY: CPT | Mod: CPTII,S$GLB,, | Performed by: FAMILY MEDICINE

## 2022-01-01 PROCEDURE — 99214 OFFICE O/P EST MOD 30 MIN: CPT | Mod: S$GLB,,, | Performed by: FAMILY MEDICINE

## 2022-01-01 PROCEDURE — 84466 ASSAY OF TRANSFERRIN: CPT | Performed by: INTERNAL MEDICINE

## 2022-01-01 PROCEDURE — 36415 COLL VENOUS BLD VENIPUNCTURE: CPT | Performed by: EMERGENCY MEDICINE

## 2022-01-01 PROCEDURE — 1159F MED LIST DOCD IN RCRD: CPT | Mod: CPTII,S$GLB,, | Performed by: PHYSICAL MEDICINE & REHABILITATION

## 2022-01-01 PROCEDURE — 3288F FALL RISK ASSESSMENT DOCD: CPT | Mod: CPTII,S$GLB,, | Performed by: FAMILY MEDICINE

## 2022-01-01 PROCEDURE — 99999 PR PBB SHADOW E&M-EST. PATIENT-LVL V: CPT | Mod: PBBFAC,,, | Performed by: INTERNAL MEDICINE

## 2022-01-01 PROCEDURE — 63600175 PHARM REV CODE 636 W HCPCS: Performed by: NURSE PRACTITIONER

## 2022-01-01 PROCEDURE — 81001 URINALYSIS AUTO W/SCOPE: CPT | Performed by: EMERGENCY MEDICINE

## 2022-01-01 PROCEDURE — 99499 UNLISTED E&M SERVICE: CPT | Mod: S$GLB,,, | Performed by: FAMILY MEDICINE

## 2022-01-01 PROCEDURE — 63600175 PHARM REV CODE 636 W HCPCS: Mod: JG | Performed by: INTERNAL MEDICINE

## 2022-01-01 PROCEDURE — 96376 TX/PRO/DX INJ SAME DRUG ADON: CPT

## 2022-01-01 PROCEDURE — 3051F PR MOST RECENT HEMOGLOBIN A1C LEVEL 7.0 - < 8.0%: ICD-10-PCS | Mod: CPTII,S$GLB,, | Performed by: NURSE PRACTITIONER

## 2022-01-01 PROCEDURE — 99999 PR PBB SHADOW E&M-EST. PATIENT-LVL IV: ICD-10-PCS | Mod: PBBFAC,,, | Performed by: INTERNAL MEDICINE

## 2022-01-01 PROCEDURE — 3288F FALL RISK ASSESSMENT DOCD: CPT | Mod: CPTII,S$GLB,, | Performed by: PHYSICAL MEDICINE & REHABILITATION

## 2022-01-01 PROCEDURE — 1100F PR PT FALLS ASSESS DOC 2+ FALLS/FALL W/INJURY/YR: ICD-10-PCS | Mod: S$GLB,,, | Performed by: INTERNAL MEDICINE

## 2022-01-01 PROCEDURE — 97110 THERAPEUTIC EXERCISES: CPT

## 2022-01-01 PROCEDURE — 99499 UNLISTED E&M SERVICE: CPT | Mod: S$GLB,,, | Performed by: NURSE PRACTITIONER

## 2022-01-01 PROCEDURE — 99499 RISK ADDL DX/OHS AUDIT: ICD-10-PCS | Mod: ,,, | Performed by: NURSE PRACTITIONER

## 2022-01-01 PROCEDURE — 3288F PR FALLS RISK ASSESSMENT DOCUMENTED: ICD-10-PCS | Mod: CPTII,S$GLB,, | Performed by: FAMILY MEDICINE

## 2022-01-01 PROCEDURE — U0002 COVID-19 LAB TEST NON-CDC: HCPCS | Performed by: EMERGENCY MEDICINE

## 2022-01-01 PROCEDURE — 80069 RENAL FUNCTION PANEL: CPT | Performed by: INTERNAL MEDICINE

## 2022-01-01 PROCEDURE — 93298 REM INTERROG DEV EVAL SCRMS: CPT | Mod: S$GLB,,, | Performed by: INTERNAL MEDICINE

## 2022-01-01 PROCEDURE — 3288F FALL RISK ASSESSMENT DOCD: CPT | Mod: S$GLB,,, | Performed by: INTERNAL MEDICINE

## 2022-01-01 PROCEDURE — 93005 ELECTROCARDIOGRAM TRACING: CPT | Performed by: INTERNAL MEDICINE

## 2022-01-01 PROCEDURE — 99499 RISK ADDL DX/OHS AUDIT: ICD-10-PCS | Mod: S$GLB,,, | Performed by: FAMILY MEDICINE

## 2022-01-01 PROCEDURE — 82962 GLUCOSE BLOOD TEST: CPT

## 2022-01-01 PROCEDURE — 1160F PR REVIEW ALL MEDS BY PRESCRIBER/CLIN PHARMACIST DOCUMENTED: ICD-10-PCS | Mod: CPTII,S$GLB,, | Performed by: OPTOMETRIST

## 2022-01-01 PROCEDURE — 92004 PR EYE EXAM, NEW PATIENT,COMPREHESV: ICD-10-PCS | Mod: S$GLB,,, | Performed by: OPTOMETRIST

## 2022-01-01 PROCEDURE — 1125F PR PAIN SEVERITY QUANTIFIED, PAIN PRESENT: ICD-10-PCS | Mod: CPTII,S$GLB,, | Performed by: NURSE PRACTITIONER

## 2022-01-01 PROCEDURE — 3072F PR LOW RISK FOR RETINOPATHY: ICD-10-PCS | Mod: CPTII,S$GLB,, | Performed by: NURSE PRACTITIONER

## 2022-01-01 PROCEDURE — 1157F ADVNC CARE PLAN IN RCRD: CPT | Mod: CPTII,S$GLB,, | Performed by: PHYSICAL MEDICINE & REHABILITATION

## 2022-01-01 PROCEDURE — 1159F PR MEDICATION LIST DOCUMENTED IN MEDICAL RECORD: ICD-10-PCS | Mod: S$GLB,,, | Performed by: INTERNAL MEDICINE

## 2022-01-01 PROCEDURE — 25000242 PHARM REV CODE 250 ALT 637 W/ HCPCS: Performed by: NURSE PRACTITIONER

## 2022-01-01 PROCEDURE — 99999 PR PBB SHADOW E&M-EST. PATIENT-LVL III: ICD-10-PCS | Mod: PBBFAC,,, | Performed by: FAMILY MEDICINE

## 2022-01-01 PROCEDURE — 99214 PR OFFICE/OUTPT VISIT, EST, LEVL IV, 30-39 MIN: ICD-10-PCS | Mod: S$GLB,,, | Performed by: NURSE PRACTITIONER

## 2022-01-01 PROCEDURE — 1159F MED LIST DOCD IN RCRD: CPT | Mod: CPTII,S$GLB,, | Performed by: OPTOMETRIST

## 2022-01-01 PROCEDURE — 3072F PR LOW RISK FOR RETINOPATHY: ICD-10-PCS | Mod: S$GLB,,, | Performed by: INTERNAL MEDICINE

## 2022-01-01 PROCEDURE — 1160F RVW MEDS BY RX/DR IN RCRD: CPT | Mod: CPTII,S$GLB,, | Performed by: PHYSICAL MEDICINE & REHABILITATION

## 2022-01-01 PROCEDURE — 1101F PT FALLS ASSESS-DOCD LE1/YR: CPT | Mod: CPTII,S$GLB,, | Performed by: NURSE PRACTITIONER

## 2022-01-01 PROCEDURE — 84484 ASSAY OF TROPONIN QUANT: CPT | Performed by: STUDENT IN AN ORGANIZED HEALTH CARE EDUCATION/TRAINING PROGRAM

## 2022-01-01 PROCEDURE — 96372 THER/PROPH/DIAG INJ SC/IM: CPT | Performed by: EMERGENCY MEDICINE

## 2022-01-01 PROCEDURE — 97530 THERAPEUTIC ACTIVITIES: CPT

## 2022-01-01 PROCEDURE — 97165 OT EVAL LOW COMPLEX 30 MIN: CPT

## 2022-01-01 PROCEDURE — 96372 THER/PROPH/DIAG INJ SC/IM: CPT | Performed by: INTERNAL MEDICINE

## 2022-01-01 PROCEDURE — 3288F PR FALLS RISK ASSESSMENT DOCUMENTED: ICD-10-PCS | Mod: CPTII,S$GLB,, | Performed by: NURSE PRACTITIONER

## 2022-01-01 PROCEDURE — 84484 ASSAY OF TROPONIN QUANT: CPT | Performed by: INTERNAL MEDICINE

## 2022-01-01 PROCEDURE — 1101F PT FALLS ASSESS-DOCD LE1/YR: CPT | Mod: CPTII,S$GLB,, | Performed by: OPTOMETRIST

## 2022-01-01 PROCEDURE — 99999 PR PBB SHADOW E&M-EST. PATIENT-LVL II: CPT | Mod: PBBFAC,,, | Performed by: OPTOMETRIST

## 2022-01-01 PROCEDURE — 99291 CRITICAL CARE FIRST HOUR: CPT

## 2022-01-01 PROCEDURE — 82728 ASSAY OF FERRITIN: CPT | Performed by: INTERNAL MEDICINE

## 2022-01-01 PROCEDURE — 99499 UNLISTED E&M SERVICE: CPT | Mod: S$GLB,,, | Performed by: INTERNAL MEDICINE

## 2022-01-01 PROCEDURE — 3288F PR FALLS RISK ASSESSMENT DOCUMENTED: ICD-10-PCS | Mod: CPTII,S$GLB,, | Performed by: INTERNAL MEDICINE

## 2022-01-01 PROCEDURE — 71250 CT THORAX DX C-: CPT | Mod: TC

## 2022-01-01 PROCEDURE — 1157F ADVNC CARE PLAN IN RCRD: CPT | Mod: CPTII,S$GLB,, | Performed by: FAMILY MEDICINE

## 2022-01-01 PROCEDURE — 1111F PR DISCHARGE MEDS RECONCILED W/ CURRENT OUTPATIENT MED LIST: ICD-10-PCS | Mod: CPTII,S$GLB,, | Performed by: FAMILY MEDICINE

## 2022-01-01 PROCEDURE — 1101F PT FALLS ASSESS-DOCD LE1/YR: CPT | Mod: CPTII,S$GLB,, | Performed by: INTERNAL MEDICINE

## 2022-01-01 PROCEDURE — 1100F PTFALLS ASSESS-DOCD GE2>/YR: CPT | Mod: S$GLB,,, | Performed by: INTERNAL MEDICINE

## 2022-01-01 PROCEDURE — 93298 CARDIAC DEVICE CHECK - REMOTE: ICD-10-PCS | Mod: S$GLB,,, | Performed by: INTERNAL MEDICINE

## 2022-01-01 PROCEDURE — 63600175 PHARM REV CODE 636 W HCPCS: Mod: JG | Performed by: NURSE PRACTITIONER

## 2022-01-01 PROCEDURE — 1126F AMNT PAIN NOTED NONE PRSNT: CPT | Mod: S$GLB,,, | Performed by: INTERNAL MEDICINE

## 2022-01-01 PROCEDURE — 83605 ASSAY OF LACTIC ACID: CPT | Performed by: EMERGENCY MEDICINE

## 2022-01-01 PROCEDURE — 2023F DILAT RTA XM W/O RTNOPTHY: CPT | Mod: CPTII,S$GLB,, | Performed by: OPTOMETRIST

## 2022-01-01 PROCEDURE — 93306 TTE W/DOPPLER COMPLETE: CPT | Mod: 26,,, | Performed by: INTERNAL MEDICINE

## 2022-01-01 PROCEDURE — 99499 UNLISTED E&M SERVICE: CPT | Mod: S$GLB,,, | Performed by: STUDENT IN AN ORGANIZED HEALTH CARE EDUCATION/TRAINING PROGRAM

## 2022-01-01 PROCEDURE — 1157F PR ADVANCE CARE PLAN OR EQUIV PRESENT IN MEDICAL RECORD: ICD-10-PCS | Mod: CPTII,S$GLB,, | Performed by: NURSE PRACTITIONER

## 2022-01-01 PROCEDURE — 84484 ASSAY OF TROPONIN QUANT: CPT | Performed by: EMERGENCY MEDICINE

## 2022-01-01 PROCEDURE — 93010 ELECTROCARDIOGRAM REPORT: CPT | Mod: ,,, | Performed by: INTERNAL MEDICINE

## 2022-01-01 PROCEDURE — 1101F PR PT FALLS ASSESS DOC 0-1 FALLS W/OUT INJ PAST YR: ICD-10-PCS | Mod: CPTII,S$GLB,, | Performed by: FAMILY MEDICINE

## 2022-01-01 PROCEDURE — 1111F PR DISCHARGE MEDS RECONCILED W/ CURRENT OUTPATIENT MED LIST: ICD-10-PCS | Mod: CPTII,S$GLB,, | Performed by: STUDENT IN AN ORGANIZED HEALTH CARE EDUCATION/TRAINING PROGRAM

## 2022-01-01 PROCEDURE — 1125F PR PAIN SEVERITY QUANTIFIED, PAIN PRESENT: ICD-10-PCS | Mod: CPTII,S$GLB,, | Performed by: FAMILY MEDICINE

## 2022-01-01 PROCEDURE — 1101F PR PT FALLS ASSESS DOC 0-1 FALLS W/OUT INJ PAST YR: ICD-10-PCS | Mod: CPTII,S$GLB,, | Performed by: STUDENT IN AN ORGANIZED HEALTH CARE EDUCATION/TRAINING PROGRAM

## 2022-01-01 PROCEDURE — 84156 ASSAY OF PROTEIN URINE: CPT | Performed by: INTERNAL MEDICINE

## 2022-01-01 PROCEDURE — 99213 OFFICE O/P EST LOW 20 MIN: CPT | Mod: S$GLB,,, | Performed by: PHYSICAL MEDICINE & REHABILITATION

## 2022-01-01 PROCEDURE — 1125F PR PAIN SEVERITY QUANTIFIED, PAIN PRESENT: ICD-10-PCS | Mod: CPTII,S$GLB,, | Performed by: PHYSICAL MEDICINE & REHABILITATION

## 2022-01-01 PROCEDURE — 1125F AMNT PAIN NOTED PAIN PRSNT: CPT | Mod: CPTII,S$GLB,, | Performed by: PHYSICAL MEDICINE & REHABILITATION

## 2022-01-01 PROCEDURE — 99499 RISK ADDL DX/OHS AUDIT: ICD-10-PCS | Mod: S$GLB,,, | Performed by: NURSE PRACTITIONER

## 2022-01-01 PROCEDURE — 99203 OFFICE O/P NEW LOW 30 MIN: CPT | Mod: 25,S$GLB,, | Performed by: PHYSICAL MEDICINE & REHABILITATION

## 2022-01-01 PROCEDURE — 99999 PR PBB SHADOW E&M-EST. PATIENT-LVL V: CPT | Mod: PBBFAC,,, | Performed by: STUDENT IN AN ORGANIZED HEALTH CARE EDUCATION/TRAINING PROGRAM

## 2022-01-01 PROCEDURE — 99223 1ST HOSP IP/OBS HIGH 75: CPT | Mod: ,,, | Performed by: INTERNAL MEDICINE

## 2022-01-01 PROCEDURE — 3072F LOW RISK FOR RETINOPATHY: CPT | Mod: CPTII,S$GLB,, | Performed by: NURSE PRACTITIONER

## 2022-01-01 PROCEDURE — 1126F PR PAIN SEVERITY QUANTIFIED, NO PAIN PRESENT: ICD-10-PCS | Mod: CPTII,S$GLB,, | Performed by: NURSE PRACTITIONER

## 2022-01-01 PROCEDURE — 96374 THER/PROPH/DIAG INJ IV PUSH: CPT

## 2022-01-01 PROCEDURE — 3288F FALL RISK ASSESSMENT DOCD: CPT | Mod: CPTII,S$GLB,, | Performed by: STUDENT IN AN ORGANIZED HEALTH CARE EDUCATION/TRAINING PROGRAM

## 2022-01-01 PROCEDURE — 84145 PROCALCITONIN (PCT): CPT | Performed by: EMERGENCY MEDICINE

## 2022-01-01 PROCEDURE — 63600175 PHARM REV CODE 636 W HCPCS: Performed by: STUDENT IN AN ORGANIZED HEALTH CARE EDUCATION/TRAINING PROGRAM

## 2022-01-01 PROCEDURE — 3072F LOW RISK FOR RETINOPATHY: CPT | Mod: CPTII,S$GLB,, | Performed by: PHYSICAL MEDICINE & REHABILITATION

## 2022-01-01 PROCEDURE — 99203 PR OFFICE/OUTPT VISIT, NEW, LEVL III, 30-44 MIN: ICD-10-PCS | Mod: 25,S$GLB,, | Performed by: STUDENT IN AN ORGANIZED HEALTH CARE EDUCATION/TRAINING PROGRAM

## 2022-01-01 PROCEDURE — 1126F PR PAIN SEVERITY QUANTIFIED, NO PAIN PRESENT: ICD-10-PCS | Mod: CPTII,S$GLB,, | Performed by: STUDENT IN AN ORGANIZED HEALTH CARE EDUCATION/TRAINING PROGRAM

## 2022-01-01 PROCEDURE — 3072F PR LOW RISK FOR RETINOPATHY: ICD-10-PCS | Mod: CPTII,S$GLB,, | Performed by: PHYSICAL MEDICINE & REHABILITATION

## 2022-01-01 PROCEDURE — 99214 OFFICE O/P EST MOD 30 MIN: CPT | Mod: S$GLB,,, | Performed by: NURSE PRACTITIONER

## 2022-01-01 PROCEDURE — 99284 EMERGENCY DEPT VISIT MOD MDM: CPT | Mod: 25

## 2022-01-01 PROCEDURE — 1126F AMNT PAIN NOTED NONE PRSNT: CPT | Mod: CPTII,S$GLB,, | Performed by: NURSE PRACTITIONER

## 2022-01-01 PROCEDURE — 93010 ELECTROCARDIOGRAM REPORT: CPT | Mod: ,,, | Performed by: SPECIALIST

## 2022-01-01 PROCEDURE — 3072F PR LOW RISK FOR RETINOPATHY: ICD-10-PCS | Mod: CPTII,S$GLB,, | Performed by: FAMILY MEDICINE

## 2022-01-01 PROCEDURE — 99232 SBSQ HOSP IP/OBS MODERATE 35: CPT | Mod: ,,, | Performed by: INTERNAL MEDICINE

## 2022-01-01 PROCEDURE — 99213 PR OFFICE/OUTPT VISIT, EST, LEVL III, 20-29 MIN: ICD-10-PCS | Mod: S$GLB,,, | Performed by: PHYSICAL MEDICINE & REHABILITATION

## 2022-01-01 PROCEDURE — 99214 OFFICE O/P EST MOD 30 MIN: CPT | Mod: S$GLB,,, | Performed by: INTERNAL MEDICINE

## 2022-01-01 PROCEDURE — 93306 ECHO (CUPID ONLY): ICD-10-PCS | Mod: 26,,, | Performed by: GENERAL PRACTICE

## 2022-01-01 PROCEDURE — 1125F AMNT PAIN NOTED PAIN PRSNT: CPT | Mod: CPTII,S$GLB,, | Performed by: FAMILY MEDICINE

## 2022-01-01 PROCEDURE — 3072F LOW RISK FOR RETINOPATHY: CPT | Mod: S$GLB,,, | Performed by: INTERNAL MEDICINE

## 2022-01-01 PROCEDURE — 84439 ASSAY OF FREE THYROXINE: CPT | Performed by: FAMILY MEDICINE

## 2022-01-01 PROCEDURE — 1159F PR MEDICATION LIST DOCUMENTED IN MEDICAL RECORD: ICD-10-PCS | Mod: CPTII,S$GLB,, | Performed by: FAMILY MEDICINE

## 2022-01-01 PROCEDURE — 71250 CT CHEST WITHOUT CONTRAST: ICD-10-PCS | Mod: 26,,, | Performed by: RADIOLOGY

## 2022-01-01 PROCEDURE — 1160F RVW MEDS BY RX/DR IN RCRD: CPT | Mod: CPTII,S$GLB,, | Performed by: FAMILY MEDICINE

## 2022-01-01 PROCEDURE — 99213 PR OFFICE/OUTPT VISIT, EST, LEVL III, 20-29 MIN: ICD-10-PCS | Mod: S$GLB,,, | Performed by: INTERNAL MEDICINE

## 2022-01-01 PROCEDURE — 1160F PR REVIEW ALL MEDS BY PRESCRIBER/CLIN PHARMACIST DOCUMENTED: ICD-10-PCS | Mod: CPTII,S$GLB,, | Performed by: PHYSICAL MEDICINE & REHABILITATION

## 2022-01-01 PROCEDURE — 80053 COMPREHEN METABOLIC PANEL: CPT | Performed by: FAMILY MEDICINE

## 2022-01-01 PROCEDURE — 1126F PR PAIN SEVERITY QUANTIFIED, NO PAIN PRESENT: ICD-10-PCS | Mod: CPTII,S$GLB,, | Performed by: OPTOMETRIST

## 2022-01-01 PROCEDURE — 99203 PR OFFICE/OUTPT VISIT, NEW, LEVL III, 30-44 MIN: ICD-10-PCS | Mod: 25,S$GLB,, | Performed by: PHYSICAL MEDICINE & REHABILITATION

## 2022-01-01 PROCEDURE — 1160F PR REVIEW ALL MEDS BY PRESCRIBER/CLIN PHARMACIST DOCUMENTED: ICD-10-PCS | Mod: CPTII,S$GLB,, | Performed by: INTERNAL MEDICINE

## 2022-01-01 PROCEDURE — 1160F PR REVIEW ALL MEDS BY PRESCRIBER/CLIN PHARMACIST DOCUMENTED: ICD-10-PCS | Mod: CPTII,S$GLB,, | Performed by: STUDENT IN AN ORGANIZED HEALTH CARE EDUCATION/TRAINING PROGRAM

## 2022-01-01 PROCEDURE — 99999 PR PBB SHADOW E&M-EST. PATIENT-LVL II: CPT | Mod: PBBFAC,,, | Performed by: STUDENT IN AN ORGANIZED HEALTH CARE EDUCATION/TRAINING PROGRAM

## 2022-01-01 PROCEDURE — 1100F PR PT FALLS ASSESS DOC 2+ FALLS/FALL W/INJURY/YR: ICD-10-PCS | Mod: CPTII,S$GLB,, | Performed by: FAMILY MEDICINE

## 2022-01-01 PROCEDURE — 99999 PR PBB SHADOW E&M-EST. PATIENT-LVL II: ICD-10-PCS | Mod: PBBFAC,,, | Performed by: STUDENT IN AN ORGANIZED HEALTH CARE EDUCATION/TRAINING PROGRAM

## 2022-01-01 PROCEDURE — 83735 ASSAY OF MAGNESIUM: CPT | Performed by: EMERGENCY MEDICINE

## 2022-01-01 PROCEDURE — 1157F ADVNC CARE PLAN IN RCRD: CPT | Mod: CPTII,S$GLB,, | Performed by: STUDENT IN AN ORGANIZED HEALTH CARE EDUCATION/TRAINING PROGRAM

## 2022-01-01 PROCEDURE — 1157F PR ADVANCE CARE PLAN OR EQUIV PRESENT IN MEDICAL RECORD: ICD-10-PCS | Mod: CPTII,S$GLB,, | Performed by: OPTOMETRIST

## 2022-01-01 PROCEDURE — 1157F PR ADVANCE CARE PLAN OR EQUIV PRESENT IN MEDICAL RECORD: ICD-10-PCS | Mod: CPTII,S$GLB,, | Performed by: FAMILY MEDICINE

## 2022-01-01 PROCEDURE — 99233 PR SUBSEQUENT HOSPITAL CARE,LEVL III: ICD-10-PCS | Mod: ,,, | Performed by: GENERAL PRACTICE

## 2022-01-01 PROCEDURE — 96372 THER/PROPH/DIAG INJ SC/IM: CPT | Performed by: NURSE PRACTITIONER

## 2022-01-01 PROCEDURE — 1111F DSCHRG MED/CURRENT MED MERGE: CPT | Mod: CPTII,S$GLB,, | Performed by: NURSE PRACTITIONER

## 2022-01-01 PROCEDURE — 1101F PR PT FALLS ASSESS DOC 0-1 FALLS W/OUT INJ PAST YR: ICD-10-PCS | Mod: CPTII,S$GLB,, | Performed by: OPTOMETRIST

## 2022-01-01 PROCEDURE — 1157F ADVNC CARE PLAN IN RCRD: CPT | Mod: CPTII,S$GLB,, | Performed by: NURSE PRACTITIONER

## 2022-01-01 PROCEDURE — 1101F PT FALLS ASSESS-DOCD LE1/YR: CPT | Mod: CPTII,S$GLB,, | Performed by: FAMILY MEDICINE

## 2022-01-01 PROCEDURE — 1160F RVW MEDS BY RX/DR IN RCRD: CPT | Mod: CPTII,S$GLB,, | Performed by: STUDENT IN AN ORGANIZED HEALTH CARE EDUCATION/TRAINING PROGRAM

## 2022-01-01 PROCEDURE — 96375 TX/PRO/DX INJ NEW DRUG ADDON: CPT

## 2022-01-01 PROCEDURE — 1159F PR MEDICATION LIST DOCUMENTED IN MEDICAL RECORD: ICD-10-PCS | Mod: CPTII,S$GLB,, | Performed by: OPTOMETRIST

## 2022-01-01 PROCEDURE — 81001 URINALYSIS AUTO W/SCOPE: CPT | Performed by: NURSE PRACTITIONER

## 2022-01-01 PROCEDURE — 36415 COLL VENOUS BLD VENIPUNCTURE: CPT | Mod: PO | Performed by: FAMILY MEDICINE

## 2022-01-01 PROCEDURE — 99999 PR PBB SHADOW E&M-EST. PATIENT-LVL V: ICD-10-PCS | Mod: PBBFAC,,, | Performed by: STUDENT IN AN ORGANIZED HEALTH CARE EDUCATION/TRAINING PROGRAM

## 2022-01-01 PROCEDURE — 87086 URINE CULTURE/COLONY COUNT: CPT | Performed by: EMERGENCY MEDICINE

## 2022-01-01 PROCEDURE — 99999 PR PBB SHADOW E&M-EST. PATIENT-LVL V: ICD-10-PCS | Mod: PBBFAC,,, | Performed by: NURSE PRACTITIONER

## 2022-01-01 PROCEDURE — 83036 HEMOGLOBIN GLYCOSYLATED A1C: CPT | Performed by: FAMILY MEDICINE

## 2022-01-01 PROCEDURE — 63600175 PHARM REV CODE 636 W HCPCS: Performed by: GENERAL PRACTICE

## 2022-01-01 PROCEDURE — 81003 URINALYSIS AUTO W/O SCOPE: CPT | Performed by: INTERNAL MEDICINE

## 2022-01-01 PROCEDURE — 82550 ASSAY OF CK (CPK): CPT | Performed by: EMERGENCY MEDICINE

## 2022-01-01 PROCEDURE — 84443 ASSAY THYROID STIM HORMONE: CPT | Performed by: EMERGENCY MEDICINE

## 2022-01-01 PROCEDURE — 80053 COMPREHEN METABOLIC PANEL: CPT | Performed by: NURSE PRACTITIONER

## 2022-01-01 PROCEDURE — 1101F PR PT FALLS ASSESS DOC 0-1 FALLS W/OUT INJ PAST YR: ICD-10-PCS | Mod: CPTII,S$GLB,, | Performed by: PHYSICAL MEDICINE & REHABILITATION

## 2022-01-01 PROCEDURE — 1126F PR PAIN SEVERITY QUANTIFIED, NO PAIN PRESENT: ICD-10-PCS | Mod: CPTII,S$GLB,, | Performed by: PHYSICAL MEDICINE & REHABILITATION

## 2022-01-01 PROCEDURE — 1125F AMNT PAIN NOTED PAIN PRSNT: CPT | Mod: CPTII,S$GLB,, | Performed by: NURSE PRACTITIONER

## 2022-01-01 PROCEDURE — 3051F HG A1C>EQUAL 7.0%<8.0%: CPT | Mod: CPTII,S$GLB,, | Performed by: NURSE PRACTITIONER

## 2022-01-01 PROCEDURE — 99999 PR PBB SHADOW E&M-EST. PATIENT-LVL III: ICD-10-PCS | Mod: PBBFAC,,, | Performed by: PHYSICAL MEDICINE & REHABILITATION

## 2022-01-01 PROCEDURE — 99999 PR PBB SHADOW E&M-EST. PATIENT-LVL III: CPT | Mod: PBBFAC,,, | Performed by: FAMILY MEDICINE

## 2022-01-01 PROCEDURE — 99999 PR PBB SHADOW E&M-EST. PATIENT-LVL V: CPT | Mod: PBBFAC,,, | Performed by: NURSE PRACTITIONER

## 2022-01-01 PROCEDURE — 3288F FALL RISK ASSESSMENT DOCD: CPT | Mod: CPTII,S$GLB,, | Performed by: INTERNAL MEDICINE

## 2022-01-01 PROCEDURE — 1159F MED LIST DOCD IN RCRD: CPT | Mod: CPTII,S$GLB,, | Performed by: INTERNAL MEDICINE

## 2022-01-01 PROCEDURE — 99999 PR PBB SHADOW E&M-EST. PATIENT-LVL V: ICD-10-PCS | Mod: PBBFAC,,, | Performed by: INTERNAL MEDICINE

## 2022-01-01 PROCEDURE — 82553 CREATINE MB FRACTION: CPT | Performed by: EMERGENCY MEDICINE

## 2022-01-01 PROCEDURE — 1159F MED LIST DOCD IN RCRD: CPT | Mod: CPTII,S$GLB,, | Performed by: NURSE PRACTITIONER

## 2022-01-01 PROCEDURE — 94618 PULMONARY STRESS TESTING: CPT | Mod: 26,,, | Performed by: INTERNAL MEDICINE

## 2022-01-01 PROCEDURE — 1159F PR MEDICATION LIST DOCUMENTED IN MEDICAL RECORD: ICD-10-PCS | Mod: CPTII,S$GLB,, | Performed by: INTERNAL MEDICINE

## 2022-01-01 PROCEDURE — 83036 HEMOGLOBIN GLYCOSYLATED A1C: CPT | Performed by: INTERNAL MEDICINE

## 2022-01-01 PROCEDURE — 1159F MED LIST DOCD IN RCRD: CPT | Mod: CPTII,S$GLB,, | Performed by: FAMILY MEDICINE

## 2022-01-01 PROCEDURE — 1126F AMNT PAIN NOTED NONE PRSNT: CPT | Mod: CPTII,S$GLB,, | Performed by: STUDENT IN AN ORGANIZED HEALTH CARE EDUCATION/TRAINING PROGRAM

## 2022-01-01 PROCEDURE — 25000003 PHARM REV CODE 250: Performed by: STUDENT IN AN ORGANIZED HEALTH CARE EDUCATION/TRAINING PROGRAM

## 2022-01-01 PROCEDURE — 36415 COLL VENOUS BLD VENIPUNCTURE: CPT | Performed by: STUDENT IN AN ORGANIZED HEALTH CARE EDUCATION/TRAINING PROGRAM

## 2022-01-01 PROCEDURE — 1159F PR MEDICATION LIST DOCUMENTED IN MEDICAL RECORD: ICD-10-PCS | Mod: CPTII,S$GLB,, | Performed by: STUDENT IN AN ORGANIZED HEALTH CARE EDUCATION/TRAINING PROGRAM

## 2022-01-01 PROCEDURE — U0002 COVID-19 LAB TEST NON-CDC: HCPCS | Performed by: INTERNAL MEDICINE

## 2022-01-01 PROCEDURE — 1126F PR PAIN SEVERITY QUANTIFIED, NO PAIN PRESENT: ICD-10-PCS | Mod: CPTII,S$GLB,, | Performed by: INTERNAL MEDICINE

## 2022-01-01 PROCEDURE — 87040 BLOOD CULTURE FOR BACTERIA: CPT | Mod: 59 | Performed by: EMERGENCY MEDICINE

## 2022-01-01 PROCEDURE — 36415 COLL VENOUS BLD VENIPUNCTURE: CPT | Performed by: FAMILY MEDICINE

## 2022-01-01 PROCEDURE — 1126F AMNT PAIN NOTED NONE PRSNT: CPT | Mod: CPTII,S$GLB,, | Performed by: OPTOMETRIST

## 2022-01-01 PROCEDURE — 1101F PT FALLS ASSESS-DOCD LE1/YR: CPT | Mod: CPTII,S$GLB,, | Performed by: STUDENT IN AN ORGANIZED HEALTH CARE EDUCATION/TRAINING PROGRAM

## 2022-01-01 PROCEDURE — 83880 ASSAY OF NATRIURETIC PEPTIDE: CPT | Performed by: NURSE PRACTITIONER

## 2022-01-01 PROCEDURE — 94618 PULMONARY STRESS TESTING: ICD-10-PCS | Mod: 26,,, | Performed by: INTERNAL MEDICINE

## 2022-01-01 PROCEDURE — 84484 ASSAY OF TROPONIN QUANT: CPT | Mod: 91 | Performed by: NURSE PRACTITIONER

## 2022-01-01 PROCEDURE — 3288F FALL RISK ASSESSMENT DOCD: CPT | Mod: CPTII,S$GLB,, | Performed by: OPTOMETRIST

## 2022-01-01 PROCEDURE — 84439 ASSAY OF FREE THYROXINE: CPT | Performed by: EMERGENCY MEDICINE

## 2022-01-01 PROCEDURE — 11721 ROUTINE FOOT CARE: ICD-10-PCS | Mod: Q9,S$GLB,, | Performed by: STUDENT IN AN ORGANIZED HEALTH CARE EDUCATION/TRAINING PROGRAM

## 2022-01-01 PROCEDURE — 12000002 HC ACUTE/MED SURGE SEMI-PRIVATE ROOM

## 2022-01-01 PROCEDURE — 1101F PT FALLS ASSESS-DOCD LE1/YR: CPT | Mod: CPTII,S$GLB,, | Performed by: PHYSICAL MEDICINE & REHABILITATION

## 2022-01-01 PROCEDURE — 99203 OFFICE O/P NEW LOW 30 MIN: CPT | Mod: S$GLB,,, | Performed by: STUDENT IN AN ORGANIZED HEALTH CARE EDUCATION/TRAINING PROGRAM

## 2022-01-01 PROCEDURE — 83735 ASSAY OF MAGNESIUM: CPT | Performed by: INTERNAL MEDICINE

## 2022-01-01 PROCEDURE — 83540 ASSAY OF IRON: CPT | Performed by: INTERNAL MEDICINE

## 2022-01-01 PROCEDURE — 1111F DSCHRG MED/CURRENT MED MERGE: CPT | Mod: CPTII,S$GLB,, | Performed by: FAMILY MEDICINE

## 2022-01-01 PROCEDURE — 87147 CULTURE TYPE IMMUNOLOGIC: CPT | Performed by: EMERGENCY MEDICINE

## 2022-01-01 PROCEDURE — 94644 CONT INHLJ TX 1ST HOUR: CPT

## 2022-01-01 PROCEDURE — 97530 THERAPEUTIC ACTIVITIES: CPT | Mod: CQ

## 2022-01-01 PROCEDURE — 1126F PR PAIN SEVERITY QUANTIFIED, NO PAIN PRESENT: ICD-10-PCS | Mod: S$GLB,,, | Performed by: INTERNAL MEDICINE

## 2022-01-01 PROCEDURE — 82272 OCCULT BLD FECES 1-3 TESTS: CPT | Performed by: EMERGENCY MEDICINE

## 2022-01-01 PROCEDURE — 99499 UNLISTED E&M SERVICE: CPT | Mod: S$GLB,,, | Performed by: OPTOMETRIST

## 2022-01-01 PROCEDURE — 99222 1ST HOSP IP/OBS MODERATE 55: CPT | Mod: ,,, | Performed by: INTERNAL MEDICINE

## 2022-01-01 PROCEDURE — 94640 AIRWAY INHALATION TREATMENT: CPT | Mod: 76

## 2022-01-01 PROCEDURE — 2023F PR DILATED RETINAL EXAM W/O EVID OF RETINOPATHY: ICD-10-PCS | Mod: CPTII,S$GLB,, | Performed by: OPTOMETRIST

## 2022-01-01 PROCEDURE — 93306 TTE W/DOPPLER COMPLETE: CPT | Mod: 26,,, | Performed by: GENERAL PRACTICE

## 2022-01-01 PROCEDURE — 99233 SBSQ HOSP IP/OBS HIGH 50: CPT | Mod: ,,, | Performed by: GENERAL PRACTICE

## 2022-01-01 PROCEDURE — 84443 ASSAY THYROID STIM HORMONE: CPT | Performed by: NURSE PRACTITIONER

## 2022-01-01 PROCEDURE — 99203 PR OFFICE/OUTPT VISIT, NEW, LEVL III, 30-44 MIN: ICD-10-PCS | Mod: S$GLB,,, | Performed by: STUDENT IN AN ORGANIZED HEALTH CARE EDUCATION/TRAINING PROGRAM

## 2022-01-01 PROCEDURE — 36415 COLL VENOUS BLD VENIPUNCTURE: CPT | Mod: PO | Performed by: INTERNAL MEDICINE

## 2022-01-01 PROCEDURE — 99499 UNLISTED E&M SERVICE: CPT | Mod: ,,, | Performed by: NURSE PRACTITIONER

## 2022-01-01 PROCEDURE — 3072F LOW RISK FOR RETINOPATHY: CPT | Mod: CPTII,S$GLB,, | Performed by: STUDENT IN AN ORGANIZED HEALTH CARE EDUCATION/TRAINING PROGRAM

## 2022-01-01 PROCEDURE — 1100F PTFALLS ASSESS-DOCD GE2>/YR: CPT | Mod: CPTII,S$GLB,, | Performed by: FAMILY MEDICINE

## 2022-01-01 PROCEDURE — 1160F RVW MEDS BY RX/DR IN RCRD: CPT | Mod: CPTII,S$GLB,, | Performed by: INTERNAL MEDICINE

## 2022-01-01 PROCEDURE — 11721 DEBRIDE NAIL 6 OR MORE: CPT | Mod: Q9,S$GLB,, | Performed by: STUDENT IN AN ORGANIZED HEALTH CARE EDUCATION/TRAINING PROGRAM

## 2022-01-01 PROCEDURE — 82570 ASSAY OF URINE CREATININE: CPT | Performed by: INTERNAL MEDICINE

## 2022-01-01 PROCEDURE — 99223 PR INITIAL HOSPITAL CARE,LEVL III: ICD-10-PCS | Mod: ,,, | Performed by: INTERNAL MEDICINE

## 2022-01-01 PROCEDURE — 92004 COMPRE OPH EXAM NEW PT 1/>: CPT | Mod: S$GLB,,, | Performed by: OPTOMETRIST

## 2022-01-01 PROCEDURE — 96372 PR INJECTION,THERAP/PROPH/DIAG2ST, IM OR SUBCUT: ICD-10-PCS | Mod: S$GLB,,, | Performed by: PHYSICAL MEDICINE & REHABILITATION

## 2022-01-01 PROCEDURE — 1160F RVW MEDS BY RX/DR IN RCRD: CPT | Mod: S$GLB,,, | Performed by: INTERNAL MEDICINE

## 2022-01-01 PROCEDURE — 1157F PR ADVANCE CARE PLAN OR EQUIV PRESENT IN MEDICAL RECORD: ICD-10-PCS | Mod: S$GLB,,, | Performed by: INTERNAL MEDICINE

## 2022-01-01 PROCEDURE — 1111F DSCHRG MED/CURRENT MED MERGE: CPT | Mod: CPTII,S$GLB,, | Performed by: STUDENT IN AN ORGANIZED HEALTH CARE EDUCATION/TRAINING PROGRAM

## 2022-01-01 PROCEDURE — 87186 SC STD MICRODIL/AGAR DIL: CPT | Performed by: EMERGENCY MEDICINE

## 2022-01-01 PROCEDURE — 99222 PR INITIAL HOSPITAL CARE,LEVL II: ICD-10-PCS | Mod: ,,, | Performed by: INTERNAL MEDICINE

## 2022-01-01 PROCEDURE — 96367 TX/PROPH/DG ADDL SEQ IV INF: CPT

## 2022-01-01 RX ORDER — LACTULOSE 10 G/15ML
20 SOLUTION ORAL ONCE
Status: COMPLETED | OUTPATIENT
Start: 2022-01-01 | End: 2022-01-01

## 2022-01-01 RX ORDER — CICLOPIROX 80 MG/ML
SOLUTION TOPICAL NIGHTLY
Qty: 6.6 ML | Refills: 3 | Status: ON HOLD | OUTPATIENT
Start: 2022-01-01 | End: 2022-01-01 | Stop reason: HOSPADM

## 2022-01-01 RX ORDER — ALBUTEROL SULFATE 0.83 MG/ML
2.5 SOLUTION RESPIRATORY (INHALATION) EVERY 6 HOURS PRN
Status: DISCONTINUED | OUTPATIENT
Start: 2022-01-01 | End: 2022-01-01 | Stop reason: HOSPADM

## 2022-01-01 RX ORDER — MAGNESIUM SULFATE HEPTAHYDRATE 40 MG/ML
2 INJECTION, SOLUTION INTRAVENOUS
Status: DISCONTINUED | OUTPATIENT
Start: 2022-01-01 | End: 2022-01-01 | Stop reason: HOSPADM

## 2022-01-01 RX ORDER — FLUTICASONE PROPIONATE 50 MCG
2 SPRAY, SUSPENSION (ML) NASAL DAILY
Status: DISCONTINUED | OUTPATIENT
Start: 2022-01-01 | End: 2022-01-01 | Stop reason: HOSPADM

## 2022-01-01 RX ORDER — ALBUTEROL SULFATE 90 UG/1
2 AEROSOL, METERED RESPIRATORY (INHALATION) EVERY 8 HOURS
Status: DISCONTINUED | OUTPATIENT
Start: 2022-01-01 | End: 2022-01-01

## 2022-01-01 RX ORDER — LEVOTHYROXINE SODIUM 88 UG/1
TABLET ORAL
Qty: 90 TABLET | Refills: 3 | Status: SHIPPED | OUTPATIENT
Start: 2022-01-01 | End: 2022-01-01 | Stop reason: SDUPTHER

## 2022-01-01 RX ORDER — TRIAMCINOLONE ACETONIDE 40 MG/ML
20 INJECTION, SUSPENSION INTRA-ARTICULAR; INTRAMUSCULAR
Status: DISCONTINUED | OUTPATIENT
Start: 2022-01-01 | End: 2022-01-01 | Stop reason: HOSPADM

## 2022-01-01 RX ORDER — ALBUTEROL SULFATE 0.83 MG/ML
2.5 SOLUTION RESPIRATORY (INHALATION)
Status: DISCONTINUED | OUTPATIENT
Start: 2022-01-01 | End: 2022-01-01 | Stop reason: HOSPADM

## 2022-01-01 RX ORDER — FUROSEMIDE 10 MG/ML
20 INJECTION INTRAMUSCULAR; INTRAVENOUS ONCE
Status: COMPLETED | OUTPATIENT
Start: 2022-01-01 | End: 2022-01-01

## 2022-01-01 RX ORDER — FUROSEMIDE 10 MG/ML
20 INJECTION INTRAMUSCULAR; INTRAVENOUS
Status: DISCONTINUED | OUTPATIENT
Start: 2022-01-01 | End: 2022-01-01

## 2022-01-01 RX ORDER — POTASSIUM CHLORIDE 20 MEQ/1
40 TABLET, EXTENDED RELEASE ORAL
Status: DISCONTINUED | OUTPATIENT
Start: 2022-01-01 | End: 2022-01-01 | Stop reason: HOSPADM

## 2022-01-01 RX ORDER — SODIUM CHLORIDE 0.9 % (FLUSH) 0.9 %
10 SYRINGE (ML) INJECTION EVERY 12 HOURS PRN
Status: DISCONTINUED | OUTPATIENT
Start: 2022-01-01 | End: 2022-01-01 | Stop reason: HOSPADM

## 2022-01-01 RX ORDER — AMOXICILLIN 250 MG
1 CAPSULE ORAL 2 TIMES DAILY
Status: DISCONTINUED | OUTPATIENT
Start: 2022-01-01 | End: 2022-01-01 | Stop reason: HOSPADM

## 2022-01-01 RX ORDER — ONDANSETRON 4 MG/1
8 TABLET, ORALLY DISINTEGRATING ORAL EVERY 8 HOURS PRN
Status: DISCONTINUED | OUTPATIENT
Start: 2022-01-01 | End: 2022-01-01 | Stop reason: HOSPADM

## 2022-01-01 RX ORDER — ACETAMINOPHEN 325 MG/1
650 TABLET ORAL EVERY 4 HOURS PRN
Status: DISCONTINUED | OUTPATIENT
Start: 2022-01-01 | End: 2022-01-01 | Stop reason: HOSPADM

## 2022-01-01 RX ORDER — CHOLECALCIFEROL (VITAMIN D3) 25 MCG
1000 TABLET ORAL DAILY
Status: DISCONTINUED | OUTPATIENT
Start: 2022-01-01 | End: 2022-01-01 | Stop reason: HOSPADM

## 2022-01-01 RX ORDER — ACETAMINOPHEN 500 MG
500 TABLET ORAL EVERY 6 HOURS PRN
Status: DISCONTINUED | OUTPATIENT
Start: 2022-01-01 | End: 2022-01-01 | Stop reason: HOSPADM

## 2022-01-01 RX ORDER — ALLOPURINOL 100 MG/1
100 TABLET ORAL DAILY
Status: DISCONTINUED | OUTPATIENT
Start: 2022-01-01 | End: 2022-01-01 | Stop reason: HOSPADM

## 2022-01-01 RX ORDER — FUROSEMIDE 20 MG/1
20 TABLET ORAL DAILY
Status: DISCONTINUED | OUTPATIENT
Start: 2022-01-01 | End: 2022-01-01 | Stop reason: HOSPADM

## 2022-01-01 RX ORDER — DOXYCYCLINE 100 MG/1
100 CAPSULE ORAL 2 TIMES DAILY
Qty: 20 CAPSULE | Refills: 0 | Status: SHIPPED | OUTPATIENT
Start: 2022-01-01 | End: 2022-01-01 | Stop reason: SDUPTHER

## 2022-01-01 RX ORDER — NICARDIPINE HYDROCHLORIDE 0.2 MG/ML
0-15 INJECTION INTRAVENOUS CONTINUOUS
Status: DISCONTINUED | OUTPATIENT
Start: 2022-01-01 | End: 2022-01-01 | Stop reason: HOSPADM

## 2022-01-01 RX ORDER — POLYETHYLENE GLYCOL 3350 17 G/17G
17 POWDER, FOR SOLUTION ORAL 2 TIMES DAILY PRN
Status: DISCONTINUED | OUTPATIENT
Start: 2022-01-01 | End: 2022-01-01 | Stop reason: HOSPADM

## 2022-01-01 RX ORDER — SODIUM CHLORIDE 0.9 % (FLUSH) 0.9 %
10 SYRINGE (ML) INJECTION
Status: DISCONTINUED | OUTPATIENT
Start: 2022-01-01 | End: 2022-01-01 | Stop reason: HOSPADM

## 2022-01-01 RX ORDER — LACTULOSE 10 G/15ML
20 SOLUTION ORAL 3 TIMES DAILY
Status: DISCONTINUED | OUTPATIENT
Start: 2022-01-01 | End: 2022-01-01 | Stop reason: HOSPADM

## 2022-01-01 RX ORDER — LEVOTHYROXINE SODIUM 88 UG/1
88 TABLET ORAL
Status: DISCONTINUED | OUTPATIENT
Start: 2022-01-01 | End: 2022-01-01 | Stop reason: HOSPADM

## 2022-01-01 RX ORDER — LANOLIN ALCOHOL/MO/W.PET/CERES
400 CREAM (GRAM) TOPICAL DAILY
Status: DISCONTINUED | OUTPATIENT
Start: 2022-01-01 | End: 2022-01-01 | Stop reason: HOSPADM

## 2022-01-01 RX ORDER — IPRATROPIUM BROMIDE AND ALBUTEROL SULFATE 2.5; .5 MG/3ML; MG/3ML
3 SOLUTION RESPIRATORY (INHALATION) EVERY 4 HOURS PRN
Status: DISCONTINUED | OUTPATIENT
Start: 2022-01-01 | End: 2022-01-01 | Stop reason: HOSPADM

## 2022-01-01 RX ORDER — ALBUMIN HUMAN 250 G/1000ML
12.5 SOLUTION INTRAVENOUS DAILY
Status: DISCONTINUED | OUTPATIENT
Start: 2022-01-01 | End: 2022-01-01

## 2022-01-01 RX ORDER — SODIUM CHLORIDE 0.9 % (FLUSH) 0.9 %
2 SYRINGE (ML) INJECTION EVERY 6 HOURS PRN
Status: DISCONTINUED | OUTPATIENT
Start: 2022-01-01 | End: 2022-01-01 | Stop reason: HOSPADM

## 2022-01-01 RX ORDER — ASPIRIN 81 MG/1
81 TABLET ORAL DAILY
Status: DISCONTINUED | OUTPATIENT
Start: 2022-01-01 | End: 2022-01-01 | Stop reason: HOSPADM

## 2022-01-01 RX ORDER — DEXTROSE 50 % IN WATER (D50W) INTRAVENOUS SYRINGE
25
Status: COMPLETED | OUTPATIENT
Start: 2022-01-01 | End: 2022-01-01

## 2022-01-01 RX ORDER — LOSARTAN POTASSIUM 25 MG/1
25 TABLET ORAL DAILY
Status: DISCONTINUED | OUTPATIENT
Start: 2022-01-01 | End: 2022-01-01

## 2022-01-01 RX ORDER — CLONIDINE HYDROCHLORIDE 0.1 MG/1
0.1 TABLET ORAL 3 TIMES DAILY
Qty: 270 TABLET | Refills: 0 | Status: SHIPPED | OUTPATIENT
Start: 2022-01-01 | End: 2022-01-01

## 2022-01-01 RX ORDER — POTASSIUM CHLORIDE 7.45 MG/ML
40 INJECTION INTRAVENOUS
Status: DISCONTINUED | OUTPATIENT
Start: 2022-01-01 | End: 2022-01-01 | Stop reason: HOSPADM

## 2022-01-01 RX ORDER — CLONIDINE HYDROCHLORIDE 0.1 MG/1
0.2 TABLET ORAL 2 TIMES DAILY
Status: DISCONTINUED | OUTPATIENT
Start: 2022-01-01 | End: 2022-01-01

## 2022-01-01 RX ORDER — LEVOTHYROXINE SODIUM 88 UG/1
88 TABLET ORAL DAILY
Status: DISCONTINUED | OUTPATIENT
Start: 2022-01-01 | End: 2022-01-01 | Stop reason: HOSPADM

## 2022-01-01 RX ORDER — AMLODIPINE BESYLATE 5 MG/1
5 TABLET ORAL
Status: COMPLETED | OUTPATIENT
Start: 2022-01-01 | End: 2022-01-01

## 2022-01-01 RX ORDER — SERTRALINE HYDROCHLORIDE 25 MG/1
25 TABLET, FILM COATED ORAL DAILY
Status: DISCONTINUED | OUTPATIENT
Start: 2022-01-01 | End: 2022-01-01 | Stop reason: HOSPADM

## 2022-01-01 RX ORDER — LACTULOSE 10 G/15ML
20 SOLUTION ORAL 3 TIMES DAILY
Status: DISCONTINUED | OUTPATIENT
Start: 2022-01-01 | End: 2022-01-01

## 2022-01-01 RX ORDER — NICARDIPINE HYDROCHLORIDE 0.2 MG/ML
0-15 INJECTION INTRAVENOUS CONTINUOUS
Status: DISCONTINUED | OUTPATIENT
Start: 2022-01-01 | End: 2022-01-01

## 2022-01-01 RX ORDER — LOSARTAN POTASSIUM 25 MG/1
25 TABLET ORAL DAILY
Qty: 90 TABLET | Refills: 3 | Status: SHIPPED | OUTPATIENT
Start: 2022-01-01 | End: 2022-01-01

## 2022-01-01 RX ORDER — LANOLIN ALCOHOL/MO/W.PET/CERES
1 CREAM (GRAM) TOPICAL DAILY
Status: DISCONTINUED | OUTPATIENT
Start: 2022-01-01 | End: 2022-01-01 | Stop reason: HOSPADM

## 2022-01-01 RX ORDER — ASPIRIN 325 MG
325 TABLET ORAL
Status: COMPLETED | OUTPATIENT
Start: 2022-01-01 | End: 2022-01-01

## 2022-01-01 RX ORDER — PANTOPRAZOLE SODIUM 40 MG/1
40 TABLET, DELAYED RELEASE ORAL
Status: DISCONTINUED | OUTPATIENT
Start: 2022-01-01 | End: 2022-01-01 | Stop reason: HOSPADM

## 2022-01-01 RX ORDER — IPRATROPIUM BROMIDE AND ALBUTEROL SULFATE 2.5; .5 MG/3ML; MG/3ML
3 SOLUTION RESPIRATORY (INHALATION)
Status: DISCONTINUED | OUTPATIENT
Start: 2022-01-01 | End: 2022-01-01

## 2022-01-01 RX ORDER — LEVALBUTEROL 1.25 MG/.5ML
1.25 SOLUTION, CONCENTRATE RESPIRATORY (INHALATION)
Status: DISCONTINUED | OUTPATIENT
Start: 2022-01-01 | End: 2022-01-01

## 2022-01-01 RX ORDER — GLUCAGON 1 MG
1 KIT INJECTION
Status: DISCONTINUED | OUTPATIENT
Start: 2022-01-01 | End: 2022-01-01 | Stop reason: HOSPADM

## 2022-01-01 RX ORDER — NALOXONE HCL 0.4 MG/ML
0.02 VIAL (ML) INJECTION
Status: DISCONTINUED | OUTPATIENT
Start: 2022-01-01 | End: 2022-01-01 | Stop reason: HOSPADM

## 2022-01-01 RX ORDER — CLONIDINE HYDROCHLORIDE 0.1 MG/1
0.1 TABLET ORAL
Status: COMPLETED | OUTPATIENT
Start: 2022-01-01 | End: 2022-01-01

## 2022-01-01 RX ORDER — MECLIZINE HCL 12.5 MG 12.5 MG/1
25 TABLET ORAL 3 TIMES DAILY PRN
Status: DISCONTINUED | OUTPATIENT
Start: 2022-01-01 | End: 2022-01-01 | Stop reason: HOSPADM

## 2022-01-01 RX ORDER — DILTIAZEM HYDROCHLORIDE 180 MG/1
180 CAPSULE, COATED, EXTENDED RELEASE ORAL DAILY
Status: DISCONTINUED | OUTPATIENT
Start: 2022-01-01 | End: 2022-01-01 | Stop reason: HOSPADM

## 2022-01-01 RX ORDER — ASCORBIC ACID 500 MG
500 TABLET ORAL DAILY
Status: DISCONTINUED | OUTPATIENT
Start: 2022-01-01 | End: 2022-01-01 | Stop reason: HOSPADM

## 2022-01-01 RX ORDER — ATORVASTATIN CALCIUM 40 MG/1
80 TABLET, FILM COATED ORAL DAILY
Status: DISCONTINUED | OUTPATIENT
Start: 2022-01-01 | End: 2022-01-01 | Stop reason: HOSPADM

## 2022-01-01 RX ORDER — ACETAMINOPHEN 325 MG/1
650 TABLET ORAL EVERY 6 HOURS PRN
Status: DISCONTINUED | OUTPATIENT
Start: 2022-01-01 | End: 2022-01-01 | Stop reason: HOSPADM

## 2022-01-01 RX ORDER — GLUCAGON 1 MG
1 KIT INJECTION
Status: DISCONTINUED | OUTPATIENT
Start: 2022-01-01 | End: 2022-01-01

## 2022-01-01 RX ORDER — IBUPROFEN 200 MG
24 TABLET ORAL
Status: DISCONTINUED | OUTPATIENT
Start: 2022-01-01 | End: 2022-01-01 | Stop reason: HOSPADM

## 2022-01-01 RX ORDER — POLYETHYLENE GLYCOL 3350 17 G/17G
17 POWDER, FOR SOLUTION ORAL DAILY
Status: DISCONTINUED | OUTPATIENT
Start: 2022-01-01 | End: 2022-01-01 | Stop reason: HOSPADM

## 2022-01-01 RX ORDER — LEVOFLOXACIN 5 MG/ML
750 INJECTION, SOLUTION INTRAVENOUS
Status: COMPLETED | OUTPATIENT
Start: 2022-01-01 | End: 2022-01-01

## 2022-01-01 RX ORDER — MONTELUKAST SODIUM 10 MG/1
10 TABLET ORAL NIGHTLY
Status: DISCONTINUED | OUTPATIENT
Start: 2022-01-01 | End: 2022-01-01 | Stop reason: HOSPADM

## 2022-01-01 RX ORDER — ISOSORBIDE MONONITRATE 30 MG/1
30 TABLET, EXTENDED RELEASE ORAL DAILY
Status: DISCONTINUED | OUTPATIENT
Start: 2022-01-01 | End: 2022-01-01 | Stop reason: HOSPADM

## 2022-01-01 RX ORDER — TAMSULOSIN HYDROCHLORIDE 0.4 MG/1
0.4 CAPSULE ORAL DAILY
Status: DISCONTINUED | OUTPATIENT
Start: 2022-01-01 | End: 2022-01-01

## 2022-01-01 RX ORDER — ONDANSETRON 2 MG/ML
4 INJECTION INTRAMUSCULAR; INTRAVENOUS EVERY 8 HOURS PRN
Status: DISCONTINUED | OUTPATIENT
Start: 2022-01-01 | End: 2022-01-01 | Stop reason: HOSPADM

## 2022-01-01 RX ORDER — TALC
6 POWDER (GRAM) TOPICAL NIGHTLY PRN
Status: DISCONTINUED | OUTPATIENT
Start: 2022-01-01 | End: 2022-01-01 | Stop reason: HOSPADM

## 2022-01-01 RX ORDER — PANTOPRAZOLE SODIUM 40 MG/1
40 TABLET, DELAYED RELEASE ORAL DAILY
Status: DISCONTINUED | OUTPATIENT
Start: 2022-01-01 | End: 2022-01-01 | Stop reason: HOSPADM

## 2022-01-01 RX ORDER — POTASSIUM CHLORIDE 20 MEQ/1
20 TABLET, EXTENDED RELEASE ORAL
Status: DISCONTINUED | OUTPATIENT
Start: 2022-01-01 | End: 2022-01-01 | Stop reason: HOSPADM

## 2022-01-01 RX ORDER — ATORVASTATIN CALCIUM 40 MG/1
40 TABLET, FILM COATED ORAL NIGHTLY
Status: DISCONTINUED | OUTPATIENT
Start: 2022-01-01 | End: 2022-01-01 | Stop reason: HOSPADM

## 2022-01-01 RX ORDER — FLUTICASONE FUROATE, UMECLIDINIUM BROMIDE AND VILANTEROL TRIFENATATE 200; 62.5; 25 UG/1; UG/1; UG/1
1 POWDER RESPIRATORY (INHALATION) DAILY
Qty: 60 EACH | Refills: 11 | Status: ON HOLD | OUTPATIENT
Start: 2022-01-01 | End: 2022-01-01 | Stop reason: HOSPADM

## 2022-01-01 RX ORDER — CIPROFLOXACIN 500 MG/1
500 TABLET ORAL DAILY
COMMUNITY
Start: 2022-01-01 | End: 2022-01-01

## 2022-01-01 RX ORDER — DOXYCYCLINE 100 MG/1
100 CAPSULE ORAL 2 TIMES DAILY
Status: DISCONTINUED | OUTPATIENT
Start: 2022-01-01 | End: 2022-01-01 | Stop reason: HOSPADM

## 2022-01-01 RX ORDER — LEVOTHYROXINE SODIUM 88 UG/1
88 TABLET ORAL DAILY
Qty: 90 TABLET | Refills: 3 | Status: ON HOLD | OUTPATIENT
Start: 2022-01-01 | End: 2022-01-01 | Stop reason: HOSPADM

## 2022-01-01 RX ORDER — AMOXICILLIN 250 MG
1 CAPSULE ORAL 2 TIMES DAILY PRN
Status: DISCONTINUED | OUTPATIENT
Start: 2022-01-01 | End: 2022-01-01

## 2022-01-01 RX ORDER — NITROGLYCERIN 0.4 MG/1
0.4 TABLET SUBLINGUAL EVERY 5 MIN PRN
Status: DISCONTINUED | OUTPATIENT
Start: 2022-01-01 | End: 2022-01-01 | Stop reason: HOSPADM

## 2022-01-01 RX ORDER — CLOBETASOL PROPIONATE 0.5 MG/G
OINTMENT TOPICAL 2 TIMES DAILY
Qty: 45 G | Refills: 1 | Status: SHIPPED | OUTPATIENT
Start: 2022-01-01 | End: 2022-01-01

## 2022-01-01 RX ORDER — ENOXAPARIN SODIUM 100 MG/ML
1 INJECTION SUBCUTANEOUS
Status: DISCONTINUED | OUTPATIENT
Start: 2022-01-01 | End: 2022-01-01

## 2022-01-01 RX ORDER — IPRATROPIUM BROMIDE 0.5 MG/2.5ML
1 SOLUTION RESPIRATORY (INHALATION)
Status: COMPLETED | OUTPATIENT
Start: 2022-01-01 | End: 2022-01-01

## 2022-01-01 RX ORDER — MAGNESIUM SULFATE HEPTAHYDRATE 40 MG/ML
4 INJECTION, SOLUTION INTRAVENOUS
Status: DISCONTINUED | OUTPATIENT
Start: 2022-01-01 | End: 2022-01-01 | Stop reason: HOSPADM

## 2022-01-01 RX ORDER — POTASSIUM CHLORIDE 7.45 MG/ML
20 INJECTION INTRAVENOUS
Status: DISCONTINUED | OUTPATIENT
Start: 2022-01-01 | End: 2022-01-01 | Stop reason: HOSPADM

## 2022-01-01 RX ORDER — DILTIAZEM HYDROCHLORIDE 180 MG/1
180 CAPSULE, COATED, EXTENDED RELEASE ORAL DAILY
Status: DISCONTINUED | OUTPATIENT
Start: 2022-01-01 | End: 2022-01-01

## 2022-01-01 RX ORDER — CLONIDINE HYDROCHLORIDE 0.1 MG/1
0.1 TABLET ORAL 2 TIMES DAILY
Status: DISCONTINUED | OUTPATIENT
Start: 2022-01-01 | End: 2022-01-01 | Stop reason: HOSPADM

## 2022-01-01 RX ORDER — MONTELUKAST SODIUM 10 MG/1
10 TABLET ORAL DAILY
Status: DISCONTINUED | OUTPATIENT
Start: 2022-01-01 | End: 2022-01-01 | Stop reason: HOSPADM

## 2022-01-01 RX ORDER — ALBUTEROL SULFATE 0.83 MG/ML
2.5 SOLUTION RESPIRATORY (INHALATION) EVERY 6 HOURS PRN
Qty: 360 EACH | Refills: 3 | Status: ON HOLD | OUTPATIENT
Start: 2022-01-01 | End: 2022-01-01 | Stop reason: HOSPADM

## 2022-01-01 RX ORDER — IBUPROFEN 200 MG
16 TABLET ORAL
Status: DISCONTINUED | OUTPATIENT
Start: 2022-01-01 | End: 2022-01-01 | Stop reason: HOSPADM

## 2022-01-01 RX ORDER — CLONIDINE HYDROCHLORIDE 0.1 MG/1
0.1 TABLET ORAL 3 TIMES DAILY
Status: DISCONTINUED | OUTPATIENT
Start: 2022-01-01 | End: 2022-01-01

## 2022-01-01 RX ORDER — NAPROXEN SODIUM 220 MG/1
81 TABLET, FILM COATED ORAL DAILY
Status: DISCONTINUED | OUTPATIENT
Start: 2022-01-01 | End: 2022-01-01

## 2022-01-01 RX ORDER — IPRATROPIUM BROMIDE AND ALBUTEROL SULFATE 2.5; .5 MG/3ML; MG/3ML
3 SOLUTION RESPIRATORY (INHALATION) EVERY 6 HOURS
Status: DISCONTINUED | OUTPATIENT
Start: 2022-01-01 | End: 2022-01-01 | Stop reason: HOSPADM

## 2022-01-01 RX ORDER — PREDNISONE 20 MG/1
20 TABLET ORAL DAILY
Qty: 5 TABLET | Refills: 0 | Status: SHIPPED | OUTPATIENT
Start: 2022-01-01 | End: 2022-01-01

## 2022-01-01 RX ORDER — LOSARTAN POTASSIUM 25 MG/1
25 TABLET ORAL DAILY
Status: DISCONTINUED | OUTPATIENT
Start: 2022-01-01 | End: 2022-01-01 | Stop reason: HOSPADM

## 2022-01-01 RX ORDER — SODIUM CHLORIDE 9 MG/ML
INJECTION, SOLUTION INTRAVENOUS CONTINUOUS
Status: DISCONTINUED | OUTPATIENT
Start: 2022-01-01 | End: 2022-01-01

## 2022-01-01 RX ORDER — FUROSEMIDE 10 MG/ML
20 INJECTION INTRAMUSCULAR; INTRAVENOUS ONCE
Status: DISCONTINUED | OUTPATIENT
Start: 2022-01-01 | End: 2022-01-01

## 2022-01-01 RX ORDER — LORAZEPAM 0.5 MG/1
0.5 TABLET ORAL DAILY PRN
Status: DISCONTINUED | OUTPATIENT
Start: 2022-01-01 | End: 2022-01-01 | Stop reason: HOSPADM

## 2022-01-01 RX ORDER — FUROSEMIDE 10 MG/ML
20 INJECTION INTRAMUSCULAR; INTRAVENOUS DAILY
Status: DISCONTINUED | OUTPATIENT
Start: 2022-01-01 | End: 2022-01-01

## 2022-01-01 RX ORDER — IPRATROPIUM BROMIDE AND ALBUTEROL SULFATE 2.5; .5 MG/3ML; MG/3ML
3 SOLUTION RESPIRATORY (INHALATION) EVERY 8 HOURS
Status: DISCONTINUED | OUTPATIENT
Start: 2022-01-01 | End: 2022-01-01

## 2022-01-01 RX ORDER — ENOXAPARIN SODIUM 100 MG/ML
40 INJECTION SUBCUTANEOUS EVERY 24 HOURS
Status: DISCONTINUED | OUTPATIENT
Start: 2022-01-01 | End: 2022-01-01

## 2022-01-01 RX ORDER — NICARDIPINE HYDROCHLORIDE 0.2 MG/ML
0-5 INJECTION INTRAVENOUS CONTINUOUS
Status: DISCONTINUED | OUTPATIENT
Start: 2022-01-01 | End: 2022-01-01

## 2022-01-01 RX ORDER — ATORVASTATIN CALCIUM 20 MG/1
20 TABLET, FILM COATED ORAL DAILY
Refills: 3 | Status: DISCONTINUED | OUTPATIENT
Start: 2022-01-01 | End: 2022-01-01 | Stop reason: HOSPADM

## 2022-01-01 RX ORDER — ENOXAPARIN SODIUM 100 MG/ML
1 INJECTION SUBCUTANEOUS
Status: COMPLETED | OUTPATIENT
Start: 2022-01-01 | End: 2022-01-01

## 2022-01-01 RX ORDER — ALLOPURINOL 100 MG/1
100 TABLET ORAL DAILY
Status: DISCONTINUED | OUTPATIENT
Start: 2022-01-01 | End: 2022-01-01

## 2022-01-01 RX ORDER — SIMETHICONE 180 MG
1 CAPSULE ORAL 3 TIMES DAILY PRN
Status: ON HOLD | COMMUNITY
Start: 2022-01-01 | End: 2022-01-01 | Stop reason: HOSPADM

## 2022-01-01 RX ORDER — INSULIN ASPART 100 [IU]/ML
0-5 INJECTION, SOLUTION INTRAVENOUS; SUBCUTANEOUS EVERY 6 HOURS PRN
Status: DISCONTINUED | OUTPATIENT
Start: 2022-01-01 | End: 2022-01-01

## 2022-01-01 RX ORDER — DILTIAZEM HYDROCHLORIDE 180 MG/1
180 CAPSULE, COATED, EXTENDED RELEASE ORAL DAILY
Qty: 90 CAPSULE | Refills: 3 | Status: ON HOLD | OUTPATIENT
Start: 2022-01-01 | End: 2022-01-01 | Stop reason: HOSPADM

## 2022-01-01 RX ORDER — OXYCODONE AND ACETAMINOPHEN 5; 325 MG/1; MG/1
1 TABLET ORAL EVERY 6 HOURS PRN
Status: DISCONTINUED | OUTPATIENT
Start: 2022-01-01 | End: 2022-01-01 | Stop reason: HOSPADM

## 2022-01-01 RX ORDER — CLONIDINE HYDROCHLORIDE 0.1 MG/1
0.1 TABLET ORAL 3 TIMES DAILY
Status: DISCONTINUED | OUTPATIENT
Start: 2022-01-01 | End: 2022-01-01 | Stop reason: HOSPADM

## 2022-01-01 RX ORDER — ALBUTEROL SULFATE 90 UG/1
2 AEROSOL, METERED RESPIRATORY (INHALATION) 4 TIMES DAILY PRN
Status: DISCONTINUED | OUTPATIENT
Start: 2022-01-01 | End: 2022-01-01 | Stop reason: HOSPADM

## 2022-01-01 RX ORDER — AMLODIPINE BESYLATE 5 MG/1
5 TABLET ORAL DAILY
Status: DISCONTINUED | OUTPATIENT
Start: 2022-01-01 | End: 2022-01-01 | Stop reason: HOSPADM

## 2022-01-01 RX ORDER — FUROSEMIDE 20 MG/1
TABLET ORAL
Qty: 30 TABLET | Status: ON HOLD | OUTPATIENT
Start: 2022-01-01 | End: 2022-01-01 | Stop reason: HOSPADM

## 2022-01-01 RX ORDER — IPRATROPIUM BROMIDE AND ALBUTEROL SULFATE 2.5; .5 MG/3ML; MG/3ML
9 SOLUTION RESPIRATORY (INHALATION)
Status: COMPLETED | OUTPATIENT
Start: 2022-01-01 | End: 2022-01-01

## 2022-01-01 RX ORDER — LABETALOL HYDROCHLORIDE 5 MG/ML
10 INJECTION, SOLUTION INTRAVENOUS
Status: COMPLETED | OUTPATIENT
Start: 2022-01-01 | End: 2022-01-01

## 2022-01-01 RX ORDER — SIMETHICONE 80 MG
1 TABLET,CHEWABLE ORAL 3 TIMES DAILY PRN
Status: DISCONTINUED | OUTPATIENT
Start: 2022-01-01 | End: 2022-01-01 | Stop reason: HOSPADM

## 2022-01-01 RX ORDER — FLUTICASONE PROPIONATE 50 MCG
2 SPRAY, SUSPENSION (ML) NASAL DAILY PRN
Status: DISCONTINUED | OUTPATIENT
Start: 2022-01-01 | End: 2022-01-01 | Stop reason: HOSPADM

## 2022-01-01 RX ORDER — MAGNESIUM SULFATE 1 G/100ML
1 INJECTION INTRAVENOUS
Status: DISCONTINUED | OUTPATIENT
Start: 2022-01-01 | End: 2022-01-01 | Stop reason: HOSPADM

## 2022-01-01 RX ORDER — FUROSEMIDE 20 MG/1
20 TABLET ORAL
Start: 2022-01-01 | End: 2022-01-01

## 2022-01-01 RX ORDER — CLONIDINE HYDROCHLORIDE 0.1 MG/1
0.1 TABLET ORAL 2 TIMES DAILY
Status: DISCONTINUED | OUTPATIENT
Start: 2022-01-01 | End: 2022-01-01

## 2022-01-01 RX ORDER — HEPARIN SODIUM 5000 [USP'U]/ML
5000 INJECTION, SOLUTION INTRAVENOUS; SUBCUTANEOUS EVERY 12 HOURS
Status: DISCONTINUED | OUTPATIENT
Start: 2022-01-01 | End: 2022-01-01 | Stop reason: HOSPADM

## 2022-01-01 RX ORDER — TAMSULOSIN HYDROCHLORIDE 0.4 MG/1
0.4 CAPSULE ORAL DAILY
Status: DISCONTINUED | OUTPATIENT
Start: 2022-01-01 | End: 2022-01-01 | Stop reason: HOSPADM

## 2022-01-01 RX ORDER — ALBUTEROL SULFATE 0.83 MG/ML
2.5 SOLUTION RESPIRATORY (INHALATION)
Status: COMPLETED | OUTPATIENT
Start: 2022-01-01 | End: 2022-01-01

## 2022-01-01 RX ORDER — SERTRALINE HYDROCHLORIDE 50 MG/1
50 TABLET, FILM COATED ORAL DAILY
Qty: 90 TABLET | Refills: 1 | Status: ON HOLD | OUTPATIENT
Start: 2022-01-01 | End: 2022-01-01 | Stop reason: HOSPADM

## 2022-01-01 RX ORDER — SODIUM CHLORIDE 450 MG/100ML
INJECTION, SOLUTION INTRAVENOUS CONTINUOUS
Status: DISCONTINUED | OUTPATIENT
Start: 2022-01-01 | End: 2022-01-01 | Stop reason: HOSPADM

## 2022-01-01 RX ORDER — AMLODIPINE BESYLATE 5 MG/1
5 TABLET ORAL 2 TIMES DAILY
COMMUNITY
End: 2022-01-01

## 2022-01-01 RX ORDER — ATORVASTATIN CALCIUM 40 MG/1
80 TABLET, FILM COATED ORAL DAILY
Status: DISCONTINUED | OUTPATIENT
Start: 2022-01-01 | End: 2022-01-01

## 2022-01-01 RX ORDER — DOXAZOSIN 1 MG/1
1 TABLET ORAL DAILY
Status: DISCONTINUED | OUTPATIENT
Start: 2022-01-01 | End: 2022-01-01

## 2022-01-01 RX ORDER — CLONIDINE HYDROCHLORIDE 0.1 MG/1
0.1 TABLET ORAL 2 TIMES DAILY
Qty: 90 TABLET | Refills: 0 | Status: ON HOLD | OUTPATIENT
Start: 2022-01-01 | End: 2022-01-01 | Stop reason: SDUPTHER

## 2022-01-01 RX ORDER — FLUCONAZOLE 100 MG/1
100 TABLET ORAL EVERY OTHER DAY
Qty: 3 TABLET | Refills: 0 | Status: SHIPPED | OUTPATIENT
Start: 2022-01-01 | End: 2022-01-01

## 2022-01-01 RX ORDER — SERTRALINE HYDROCHLORIDE 50 MG/1
50 TABLET, FILM COATED ORAL DAILY
Status: DISCONTINUED | OUTPATIENT
Start: 2022-01-01 | End: 2022-01-01 | Stop reason: HOSPADM

## 2022-01-01 RX ORDER — DOCUSATE SODIUM 100 MG/1
100 CAPSULE, LIQUID FILLED ORAL DAILY
Status: DISCONTINUED | OUTPATIENT
Start: 2022-01-01 | End: 2022-01-01 | Stop reason: HOSPADM

## 2022-01-01 RX ORDER — LOSARTAN POTASSIUM 25 MG/1
25 TABLET ORAL DAILY
Qty: 90 TABLET | Refills: 3 | Status: ON HOLD | OUTPATIENT
Start: 2022-01-01 | End: 2022-01-01 | Stop reason: HOSPADM

## 2022-01-01 RX ORDER — LOSARTAN POTASSIUM 25 MG/1
25 TABLET ORAL 2 TIMES DAILY
Qty: 180 TABLET | Refills: 3 | Status: ON HOLD | OUTPATIENT
Start: 2022-01-01 | End: 2022-01-01 | Stop reason: HOSPADM

## 2022-01-01 RX ORDER — OMEPRAZOLE 40 MG/1
40 CAPSULE, DELAYED RELEASE ORAL DAILY
Qty: 90 CAPSULE | Refills: 3 | Status: ON HOLD | OUTPATIENT
Start: 2022-01-01 | End: 2022-01-01 | Stop reason: HOSPADM

## 2022-01-01 RX ORDER — BUDESONIDE 0.5 MG/2ML
0.5 INHALANT ORAL EVERY 12 HOURS
Status: DISCONTINUED | OUTPATIENT
Start: 2022-01-01 | End: 2022-01-01

## 2022-01-01 RX ORDER — FLUTICASONE FUROATE AND VILANTEROL 200; 25 UG/1; UG/1
1 POWDER RESPIRATORY (INHALATION) DAILY
Status: DISCONTINUED | OUTPATIENT
Start: 2022-01-01 | End: 2022-01-01 | Stop reason: HOSPADM

## 2022-01-01 RX ORDER — FLUCONAZOLE 150 MG/1
TABLET ORAL
Qty: 2 TABLET | Refills: 0 | Status: SHIPPED | OUTPATIENT
Start: 2022-01-01 | End: 2022-01-01

## 2022-01-01 RX ORDER — POLYETHYLENE GLYCOL 3350 17 G/17G
17 POWDER, FOR SOLUTION ORAL 2 TIMES DAILY
Qty: 850 G | Refills: 3 | Status: ON HOLD | OUTPATIENT
Start: 2022-01-01 | End: 2022-01-01 | Stop reason: HOSPADM

## 2022-01-01 RX ORDER — INSULIN ASPART 100 [IU]/ML
0-5 INJECTION, SOLUTION INTRAVENOUS; SUBCUTANEOUS
Status: DISCONTINUED | OUTPATIENT
Start: 2022-01-01 | End: 2022-01-01 | Stop reason: HOSPADM

## 2022-01-01 RX ORDER — METHYLPREDNISOLONE SOD SUCC 125 MG
125 VIAL (EA) INJECTION
Status: COMPLETED | OUTPATIENT
Start: 2022-01-01 | End: 2022-01-01

## 2022-01-01 RX ORDER — ATORVASTATIN CALCIUM 20 MG/1
80 TABLET, FILM COATED ORAL DAILY
Status: DISCONTINUED | OUTPATIENT
Start: 2022-01-01 | End: 2022-01-01 | Stop reason: HOSPADM

## 2022-01-01 RX ORDER — ALLOPURINOL 100 MG/1
100 TABLET ORAL DAILY
Qty: 90 TABLET | Refills: 3 | Status: ON HOLD | OUTPATIENT
Start: 2022-01-01 | End: 2022-01-01 | Stop reason: HOSPADM

## 2022-01-01 RX ORDER — FUROSEMIDE 10 MG/ML
40 INJECTION INTRAMUSCULAR; INTRAVENOUS ONCE
Status: COMPLETED | OUTPATIENT
Start: 2022-01-01 | End: 2022-01-01

## 2022-01-01 RX ORDER — PREDNISONE 20 MG/1
40 TABLET ORAL
Status: COMPLETED | OUTPATIENT
Start: 2022-01-01 | End: 2022-01-01

## 2022-01-01 RX ORDER — ATORVASTATIN CALCIUM 40 MG/1
40 TABLET, FILM COATED ORAL DAILY
Status: DISCONTINUED | OUTPATIENT
Start: 2022-01-01 | End: 2022-01-01 | Stop reason: HOSPADM

## 2022-01-01 RX ORDER — CALCIUM CARBONATE 200(500)MG
500 TABLET,CHEWABLE ORAL 2 TIMES DAILY
Status: DISCONTINUED | OUTPATIENT
Start: 2022-01-01 | End: 2022-01-01 | Stop reason: HOSPADM

## 2022-01-01 RX ORDER — NYSTATIN 100000 [USP'U]/G
POWDER TOPICAL 4 TIMES DAILY
Qty: 60 G | Refills: 1 | Status: SHIPPED | OUTPATIENT
Start: 2022-01-01 | End: 2022-01-01

## 2022-01-01 RX ORDER — LANOLIN ALCOHOL/MO/W.PET/CERES
800 CREAM (GRAM) TOPICAL
Status: DISCONTINUED | OUTPATIENT
Start: 2022-01-01 | End: 2022-01-01 | Stop reason: HOSPADM

## 2022-01-01 RX ORDER — AMLODIPINE BESYLATE 5 MG/1
5 TABLET ORAL DAILY
COMMUNITY
End: 2022-01-01

## 2022-01-01 RX ORDER — DOXYCYCLINE 100 MG/1
100 CAPSULE ORAL 2 TIMES DAILY
Qty: 20 CAPSULE | Refills: 0 | Status: SHIPPED | OUTPATIENT
Start: 2022-01-01 | End: 2022-01-01

## 2022-01-01 RX ORDER — PREDNISONE 20 MG/1
20 TABLET ORAL DAILY
Status: DISCONTINUED | OUTPATIENT
Start: 2022-01-01 | End: 2022-01-01 | Stop reason: HOSPADM

## 2022-01-01 RX ORDER — ENOXAPARIN SODIUM 100 MG/ML
30 INJECTION SUBCUTANEOUS EVERY 24 HOURS
Status: DISCONTINUED | OUTPATIENT
Start: 2022-01-01 | End: 2022-01-01

## 2022-01-01 RX ORDER — ALBUTEROL SULFATE 0.83 MG/ML
SOLUTION RESPIRATORY (INHALATION)
Qty: 300 ML | Refills: 3 | Status: SHIPPED | OUTPATIENT
Start: 2022-01-01 | End: 2022-01-01 | Stop reason: SDUPTHER

## 2022-01-01 RX ORDER — AMLODIPINE BESYLATE 5 MG/1
5 TABLET ORAL 2 TIMES DAILY
Status: DISCONTINUED | OUTPATIENT
Start: 2022-01-01 | End: 2022-01-01 | Stop reason: HOSPADM

## 2022-01-01 RX ORDER — ROSUVASTATIN CALCIUM 20 MG/1
20 TABLET, COATED ORAL DAILY
Qty: 90 TABLET | Refills: 3 | Status: ON HOLD | OUTPATIENT
Start: 2022-01-01 | End: 2022-01-01 | Stop reason: HOSPADM

## 2022-01-01 RX ORDER — ENOXAPARIN SODIUM 100 MG/ML
1 INJECTION SUBCUTANEOUS
Status: DISCONTINUED | OUTPATIENT
Start: 2022-01-01 | End: 2022-01-01 | Stop reason: HOSPADM

## 2022-01-01 RX ORDER — LEVOFLOXACIN 5 MG/ML
500 INJECTION, SOLUTION INTRAVENOUS
Status: DISCONTINUED | OUTPATIENT
Start: 2022-01-01 | End: 2022-01-01

## 2022-01-01 RX ORDER — CLONIDINE HYDROCHLORIDE 0.1 MG/1
0.1 TABLET ORAL 3 TIMES DAILY
Qty: 90 TABLET | Refills: 0 | Status: ON HOLD | OUTPATIENT
Start: 2022-01-01 | End: 2022-01-01

## 2022-01-01 RX ORDER — ACETAMINOPHEN 325 MG/1
325 TABLET ORAL EVERY 6 HOURS PRN
Status: ON HOLD | COMMUNITY
End: 2022-01-01 | Stop reason: HOSPADM

## 2022-01-01 RX ORDER — LEVOFLOXACIN 500 MG/1
500 TABLET, FILM COATED ORAL EVERY OTHER DAY
Qty: 7 TABLET | Refills: 0 | Status: SHIPPED | OUTPATIENT
Start: 2022-01-01 | End: 2022-01-01

## 2022-01-01 RX ORDER — LEVALBUTEROL 1.25 MG/.5ML
3.75 SOLUTION, CONCENTRATE RESPIRATORY (INHALATION)
Status: COMPLETED | OUTPATIENT
Start: 2022-01-01 | End: 2022-01-01

## 2022-01-01 RX ORDER — IPRATROPIUM BROMIDE AND ALBUTEROL SULFATE 2.5; .5 MG/3ML; MG/3ML
3 SOLUTION RESPIRATORY (INHALATION)
Status: COMPLETED | OUTPATIENT
Start: 2022-01-01 | End: 2022-01-01

## 2022-01-01 RX ADMIN — FLUTICASONE FUROATE AND VILANTEROL TRIFENATATE 1 PUFF: 200; 25 POWDER RESPIRATORY (INHALATION) at 07:03

## 2022-01-01 RX ADMIN — PREDNISONE 40 MG: 20 TABLET ORAL at 07:10

## 2022-01-01 RX ADMIN — Medication 6 MG: at 08:10

## 2022-01-01 RX ADMIN — POLYETHYLENE GLYCOL 3350 17 G: 17 POWDER, FOR SOLUTION ORAL at 08:10

## 2022-01-01 RX ADMIN — LACTULOSE 20 G: 20 SOLUTION ORAL at 08:10

## 2022-01-01 RX ADMIN — MONTELUKAST 10 MG: 10 TABLET, FILM COATED ORAL at 08:07

## 2022-01-01 RX ADMIN — SERTRALINE HYDROCHLORIDE 25 MG: 25 TABLET ORAL at 09:07

## 2022-01-01 RX ADMIN — THERA TABS 1 TABLET: TAB at 09:01

## 2022-01-01 RX ADMIN — FLUTICASONE PROPIONATE 100 MCG: 50 SPRAY, METERED NASAL at 09:01

## 2022-01-01 RX ADMIN — LEVOTHYROXINE SODIUM 88 MCG: 0.09 TABLET ORAL at 05:10

## 2022-01-01 RX ADMIN — CLONIDINE HYDROCHLORIDE 0.1 MG: 0.1 TABLET ORAL at 02:07

## 2022-01-01 RX ADMIN — ENOXAPARIN SODIUM 60 MG: 60 INJECTION SUBCUTANEOUS at 12:10

## 2022-01-01 RX ADMIN — TAMSULOSIN HYDROCHLORIDE 0.4 MG: 0.4 CAPSULE ORAL at 05:03

## 2022-01-01 RX ADMIN — OXYCODONE HYDROCHLORIDE AND ACETAMINOPHEN 500 MG: 500 TABLET ORAL at 09:10

## 2022-01-01 RX ADMIN — IPRATROPIUM BROMIDE AND ALBUTEROL SULFATE 3 ML: .5; 3 SOLUTION RESPIRATORY (INHALATION) at 07:10

## 2022-01-01 RX ADMIN — SERTRALINE HYDROCHLORIDE 25 MG: 25 TABLET ORAL at 08:03

## 2022-01-01 RX ADMIN — FUROSEMIDE 20 MG: 10 INJECTION, SOLUTION INTRAMUSCULAR; INTRAVENOUS at 09:10

## 2022-01-01 RX ADMIN — IPRATROPIUM BROMIDE AND ALBUTEROL SULFATE 3 ML: .5; 3 SOLUTION RESPIRATORY (INHALATION) at 07:01

## 2022-01-01 RX ADMIN — IPRATROPIUM BROMIDE AND ALBUTEROL SULFATE 3 ML: .5; 3 SOLUTION RESPIRATORY (INHALATION) at 01:10

## 2022-01-01 RX ADMIN — EPOETIN ALFA-EPBX 3000 UNITS: 3000 INJECTION, SOLUTION INTRAVENOUS; SUBCUTANEOUS at 04:10

## 2022-01-01 RX ADMIN — PANTOPRAZOLE SODIUM 40 MG: 40 TABLET, DELAYED RELEASE ORAL at 08:07

## 2022-01-01 RX ADMIN — SIMETHICONE 80 MG: 80 TABLET, CHEWABLE ORAL at 11:07

## 2022-01-01 RX ADMIN — CEFTRIAXONE 2 G: 2 INJECTION, SOLUTION INTRAVENOUS at 02:01

## 2022-01-01 RX ADMIN — PREDNISONE 20 MG: 20 TABLET ORAL at 05:10

## 2022-01-01 RX ADMIN — PANTOPRAZOLE SODIUM 40 MG: 40 TABLET, DELAYED RELEASE ORAL at 09:07

## 2022-01-01 RX ADMIN — LOSARTAN POTASSIUM 25 MG: 25 TABLET, FILM COATED ORAL at 09:10

## 2022-01-01 RX ADMIN — EPOETIN ALFA-EPBX 3000 UNITS: 3000 INJECTION, SOLUTION INTRAVENOUS; SUBCUTANEOUS at 11:08

## 2022-01-01 RX ADMIN — ENOXAPARIN SODIUM 60 MG: 60 INJECTION SUBCUTANEOUS at 04:10

## 2022-01-01 RX ADMIN — MONTELUKAST 10 MG: 10 TABLET, FILM COATED ORAL at 08:10

## 2022-01-01 RX ADMIN — ALLOPURINOL 100 MG: 100 TABLET ORAL at 05:03

## 2022-01-01 RX ADMIN — Medication 6 MG: at 09:10

## 2022-01-01 RX ADMIN — MONTELUKAST 10 MG: 10 TABLET, FILM COATED ORAL at 08:03

## 2022-01-01 RX ADMIN — LABETALOL HYDROCHLORIDE 10 MG: 5 INJECTION, SOLUTION INTRAVENOUS at 08:03

## 2022-01-01 RX ADMIN — PREDNISONE 20 MG: 20 TABLET ORAL at 08:10

## 2022-01-01 RX ADMIN — EPOETIN ALFA-EPBX 3000 UNITS: 3000 INJECTION, SOLUTION INTRAVENOUS; SUBCUTANEOUS at 11:04

## 2022-01-01 RX ADMIN — OXYCODONE HYDROCHLORIDE AND ACETAMINOPHEN 500 MG: 500 TABLET ORAL at 09:07

## 2022-01-01 RX ADMIN — FUROSEMIDE 40 MG: 10 INJECTION, SOLUTION INTRAMUSCULAR; INTRAVENOUS at 08:03

## 2022-01-01 RX ADMIN — DOCUSATE SODIUM 100 MG: 100 CAPSULE, LIQUID FILLED ORAL at 08:10

## 2022-01-01 RX ADMIN — ATORVASTATIN CALCIUM 80 MG: 20 TABLET, FILM COATED ORAL at 09:07

## 2022-01-01 RX ADMIN — HEPARIN SODIUM 5000 UNITS: 5000 INJECTION, SOLUTION INTRAVENOUS; SUBCUTANEOUS at 08:03

## 2022-01-01 RX ADMIN — ATORVASTATIN CALCIUM 40 MG: 40 TABLET, FILM COATED ORAL at 08:10

## 2022-01-01 RX ADMIN — ISOSORBIDE MONONITRATE 30 MG: 30 TABLET, EXTENDED RELEASE ORAL at 08:10

## 2022-01-01 RX ADMIN — LOSARTAN POTASSIUM 25 MG: 25 TABLET, FILM COATED ORAL at 08:10

## 2022-01-01 RX ADMIN — CALCIUM CARBONATE (ANTACID) CHEW TAB 500 MG 500 MG: 500 CHEW TAB at 09:03

## 2022-01-01 RX ADMIN — ISOSORBIDE MONONITRATE 30 MG: 30 TABLET, EXTENDED RELEASE ORAL at 09:10

## 2022-01-01 RX ADMIN — IPRATROPIUM BROMIDE AND ALBUTEROL SULFATE 3 ML: .5; 3 SOLUTION RESPIRATORY (INHALATION) at 09:10

## 2022-01-01 RX ADMIN — POLYETHYLENE GLYCOL 3350 17 G: 17 POWDER, FOR SOLUTION ORAL at 09:10

## 2022-01-01 RX ADMIN — LOSARTAN POTASSIUM 25 MG: 25 TABLET, FILM COATED ORAL at 05:03

## 2022-01-01 RX ADMIN — SERTRALINE HYDROCHLORIDE 50 MG: 50 TABLET ORAL at 08:10

## 2022-01-01 RX ADMIN — INSULIN ASPART 2 UNITS: 100 INJECTION, SOLUTION INTRAVENOUS; SUBCUTANEOUS at 12:10

## 2022-01-01 RX ADMIN — CLONIDINE HYDROCHLORIDE 0.1 MG: 0.1 TABLET ORAL at 08:07

## 2022-01-01 RX ADMIN — SERTRALINE HYDROCHLORIDE 50 MG: 50 TABLET ORAL at 09:10

## 2022-01-01 RX ADMIN — ATORVASTATIN CALCIUM 40 MG: 40 TABLET, FILM COATED ORAL at 09:10

## 2022-01-01 RX ADMIN — OXYCODONE HYDROCHLORIDE AND ACETAMINOPHEN 500 MG: 500 TABLET ORAL at 08:10

## 2022-01-01 RX ADMIN — LEVOTHYROXINE SODIUM 88 MCG: 88 TABLET ORAL at 06:01

## 2022-01-01 RX ADMIN — METHYLPREDNISOLONE SODIUM SUCCINATE 40 MG: 40 INJECTION, POWDER, FOR SOLUTION INTRAMUSCULAR; INTRAVENOUS at 06:01

## 2022-01-01 RX ADMIN — MONTELUKAST 10 MG: 10 TABLET, FILM COATED ORAL at 09:10

## 2022-01-01 RX ADMIN — ENOXAPARIN SODIUM 60 MG: 60 INJECTION SUBCUTANEOUS at 06:10

## 2022-01-01 RX ADMIN — SERTRALINE HYDROCHLORIDE 25 MG: 25 TABLET ORAL at 09:01

## 2022-01-01 RX ADMIN — DILTIAZEM HYDROCHLORIDE 180 MG: 180 CAPSULE, COATED, EXTENDED RELEASE ORAL at 11:03

## 2022-01-01 RX ADMIN — ALBUTEROL SULFATE 2 PUFF: 90 AEROSOL, METERED RESPIRATORY (INHALATION) at 09:03

## 2022-01-01 RX ADMIN — DILTIAZEM HYDROCHLORIDE 180 MG: 180 CAPSULE, COATED, EXTENDED RELEASE ORAL at 09:10

## 2022-01-01 RX ADMIN — OXYCODONE HYDROCHLORIDE AND ACETAMINOPHEN 500 MG: 500 TABLET ORAL at 09:01

## 2022-01-01 RX ADMIN — DILTIAZEM HYDROCHLORIDE 180 MG: 180 CAPSULE, COATED, EXTENDED RELEASE ORAL at 08:10

## 2022-01-01 RX ADMIN — LACTULOSE 20 G: 20 SOLUTION ORAL at 09:10

## 2022-01-01 RX ADMIN — LACTULOSE 20 G: 20 SOLUTION ORAL at 02:07

## 2022-01-01 RX ADMIN — Medication 1000 UNITS: at 09:03

## 2022-01-01 RX ADMIN — EPOETIN ALFA-EPBX 5700 UNITS: 10000 INJECTION, SOLUTION INTRAVENOUS; SUBCUTANEOUS at 09:10

## 2022-01-01 RX ADMIN — METHYLPREDNISOLONE SODIUM SUCCINATE 40 MG: 40 INJECTION, POWDER, FOR SOLUTION INTRAMUSCULAR; INTRAVENOUS at 09:01

## 2022-01-01 RX ADMIN — IPRATROPIUM BROMIDE AND ALBUTEROL SULFATE 3 ML: .5; 3 SOLUTION RESPIRATORY (INHALATION) at 04:10

## 2022-01-01 RX ADMIN — PREDNISONE 20 MG: 20 TABLET ORAL at 09:10

## 2022-01-01 RX ADMIN — LEVOTHYROXINE SODIUM 88 MCG: 0.09 TABLET ORAL at 06:10

## 2022-01-01 RX ADMIN — LEVOTHYROXINE SODIUM 88 MCG: 88 TABLET ORAL at 05:07

## 2022-01-01 RX ADMIN — AMLODIPINE BESYLATE 5 MG: 5 TABLET ORAL at 09:03

## 2022-01-01 RX ADMIN — IPRATROPIUM BROMIDE 1 MG: 0.5 SOLUTION RESPIRATORY (INHALATION) at 01:01

## 2022-01-01 RX ADMIN — EPOETIN ALFA-EPBX 3000 UNITS: 3000 INJECTION, SOLUTION INTRAVENOUS; SUBCUTANEOUS at 03:06

## 2022-01-01 RX ADMIN — THERA TABS 1 TABLET: TAB at 09:07

## 2022-01-01 RX ADMIN — PANTOPRAZOLE SODIUM 40 MG: 40 TABLET, DELAYED RELEASE ORAL at 09:01

## 2022-01-01 RX ADMIN — SENNOSIDES AND DOCUSATE SODIUM 1 TABLET: 8.6; 5 TABLET ORAL at 09:01

## 2022-01-01 RX ADMIN — DOCUSATE SODIUM 100 MG: 100 CAPSULE, LIQUID FILLED ORAL at 09:10

## 2022-01-01 RX ADMIN — OXYCODONE HYDROCHLORIDE AND ACETAMINOPHEN 500 MG: 500 TABLET ORAL at 08:03

## 2022-01-01 RX ADMIN — LOSARTAN POTASSIUM 25 MG: 25 TABLET, FILM COATED ORAL at 09:01

## 2022-01-01 RX ADMIN — PANTOPRAZOLE SODIUM 40 MG: 40 TABLET, DELAYED RELEASE ORAL at 05:03

## 2022-01-01 RX ADMIN — LORAZEPAM 0.5 MG: 0.5 TABLET ORAL at 06:10

## 2022-01-01 RX ADMIN — SODIUM ZIRCONIUM CYCLOSILICATE 5 G: 5 POWDER, FOR SUSPENSION ORAL at 04:10

## 2022-01-01 RX ADMIN — SENNOSIDES AND DOCUSATE SODIUM 1 TABLET: 50; 8.6 TABLET ORAL at 09:07

## 2022-01-01 RX ADMIN — IPRATROPIUM BROMIDE AND ALBUTEROL SULFATE 3 ML: .5; 3 SOLUTION RESPIRATORY (INHALATION) at 12:10

## 2022-01-01 RX ADMIN — POLYETHYLENE GLYCOL 3350 17 G: 17 POWDER, FOR SOLUTION ORAL at 06:10

## 2022-01-01 RX ADMIN — DOXYCYCLINE HYCLATE 100 MG: 100 CAPSULE ORAL at 09:01

## 2022-01-01 RX ADMIN — ALBUTEROL SULFATE 2.5 MG: 2.5 SOLUTION RESPIRATORY (INHALATION) at 01:03

## 2022-01-01 RX ADMIN — SENNOSIDES AND DOCUSATE SODIUM 1 TABLET: 50; 8.6 TABLET ORAL at 08:07

## 2022-01-01 RX ADMIN — POTASSIUM BICARBONATE 10 MEQ: 391 TABLET, EFFERVESCENT ORAL at 08:03

## 2022-01-01 RX ADMIN — FUROSEMIDE 20 MG: 10 INJECTION, SOLUTION INTRAMUSCULAR; INTRAVENOUS at 10:10

## 2022-01-01 RX ADMIN — IPRATROPIUM BROMIDE AND ALBUTEROL SULFATE 9 ML: .5; 3 SOLUTION RESPIRATORY (INHALATION) at 03:01

## 2022-01-01 RX ADMIN — HUMAN INSULIN 8 UNITS: 100 INJECTION, SOLUTION SUBCUTANEOUS at 05:07

## 2022-01-01 RX ADMIN — ATORVASTATIN CALCIUM 80 MG: 40 TABLET, FILM COATED ORAL at 09:01

## 2022-01-01 RX ADMIN — DILTIAZEM HYDROCHLORIDE 180 MG: 180 CAPSULE, COATED, EXTENDED RELEASE ORAL at 08:03

## 2022-01-01 RX ADMIN — METHYLPREDNISOLONE SODIUM SUCCINATE 125 MG: 125 INJECTION, POWDER, FOR SOLUTION INTRAMUSCULAR; INTRAVENOUS at 02:01

## 2022-01-01 RX ADMIN — FUROSEMIDE 20 MG: 10 INJECTION, SOLUTION INTRAMUSCULAR; INTRAVENOUS at 05:10

## 2022-01-01 RX ADMIN — LEVOTHYROXINE SODIUM 88 MCG: 88 TABLET ORAL at 06:03

## 2022-01-01 RX ADMIN — ASPIRIN 81 MG: 81 TABLET, COATED ORAL at 09:10

## 2022-01-01 RX ADMIN — ACETAMINOPHEN 650 MG: 325 TABLET, FILM COATED ORAL at 11:03

## 2022-01-01 RX ADMIN — IPRATROPIUM BROMIDE AND ALBUTEROL SULFATE 3 ML: .5; 3 SOLUTION RESPIRATORY (INHALATION) at 10:10

## 2022-01-01 RX ADMIN — Medication 6 MG: at 11:10

## 2022-01-01 RX ADMIN — FUROSEMIDE 20 MG: 10 INJECTION, SOLUTION INTRAVENOUS at 09:10

## 2022-01-01 RX ADMIN — TAMSULOSIN HYDROCHLORIDE 0.4 MG: 0.4 CAPSULE ORAL at 08:03

## 2022-01-01 RX ADMIN — EPOETIN ALFA-EPBX 3000 UNITS: 3000 INJECTION, SOLUTION INTRAVENOUS; SUBCUTANEOUS at 02:05

## 2022-01-01 RX ADMIN — ASPIRIN 81 MG: 81 TABLET, COATED ORAL at 08:10

## 2022-01-01 RX ADMIN — LEVOTHYROXINE SODIUM 88 MCG: 88 TABLET ORAL at 05:03

## 2022-01-01 RX ADMIN — Medication 1000 UNITS: at 08:03

## 2022-01-01 RX ADMIN — CLONIDINE HYDROCHLORIDE 0.1 MG: 0.1 TABLET ORAL at 09:07

## 2022-01-01 RX ADMIN — PANTOPRAZOLE SODIUM 40 MG: 40 TABLET, DELAYED RELEASE ORAL at 06:01

## 2022-01-01 RX ADMIN — EPOETIN ALFA-EPBX 3000 UNITS: 3000 INJECTION, SOLUTION INTRAVENOUS; SUBCUTANEOUS at 02:03

## 2022-01-01 RX ADMIN — SERTRALINE HYDROCHLORIDE 25 MG: 25 TABLET ORAL at 09:03

## 2022-01-01 RX ADMIN — LACTULOSE 20 G: 20 SOLUTION ORAL at 10:10

## 2022-01-01 RX ADMIN — PIPERACILLIN SODIUM AND TAZOBACTAM SODIUM 3.38 G: 3; .375 INJECTION, POWDER, LYOPHILIZED, FOR SOLUTION INTRAVENOUS at 05:07

## 2022-01-01 RX ADMIN — HEPARIN SODIUM 5000 UNITS: 5000 INJECTION, SOLUTION INTRAVENOUS; SUBCUTANEOUS at 09:03

## 2022-01-01 RX ADMIN — IPRATROPIUM BROMIDE AND ALBUTEROL SULFATE 3 ML: .5; 3 SOLUTION RESPIRATORY (INHALATION) at 05:10

## 2022-01-01 RX ADMIN — ATORVASTATIN CALCIUM 40 MG: 40 TABLET, FILM COATED ORAL at 12:10

## 2022-01-01 RX ADMIN — TRIAMCINOLONE ACETONIDE 20 MG: 40 INJECTION, SUSPENSION INTRA-ARTICULAR; INTRAMUSCULAR at 02:02

## 2022-01-01 RX ADMIN — MONTELUKAST 10 MG: 10 TABLET, FILM COATED ORAL at 10:10

## 2022-01-01 RX ADMIN — FERROUS SULFATE TAB 325 MG (65 MG ELEMENTAL FE) 1 EACH: 325 (65 FE) TAB at 09:07

## 2022-01-01 RX ADMIN — CLONIDINE HYDROCHLORIDE 0.1 MG: 0.1 TABLET ORAL at 08:03

## 2022-01-01 RX ADMIN — LEVOFLOXACIN 500 MG: 500 INJECTION, SOLUTION INTRAVENOUS at 10:10

## 2022-01-01 RX ADMIN — POLYETHYLENE GLYCOL 3350 17 G: 17 POWDER, FOR SOLUTION ORAL at 05:10

## 2022-01-01 RX ADMIN — CALCIUM CARBONATE (ANTACID) CHEW TAB 500 MG 500 MG: 500 CHEW TAB at 08:03

## 2022-01-01 RX ADMIN — ALBUTEROL SULFATE 2.5 MG: 2.5 SOLUTION RESPIRATORY (INHALATION) at 07:03

## 2022-01-01 RX ADMIN — DEXTROSE MONOHYDRATE 25 G: 25 INJECTION, SOLUTION INTRAVENOUS at 05:07

## 2022-01-01 RX ADMIN — LACTULOSE 20 G: 20 SOLUTION ORAL at 04:10

## 2022-01-01 RX ADMIN — EPOETIN ALFA-EPBX 3000 UNITS: 3000 INJECTION, SOLUTION INTRAVENOUS; SUBCUTANEOUS at 10:02

## 2022-01-01 RX ADMIN — AMLODIPINE BESYLATE 5 MG: 5 TABLET ORAL at 09:01

## 2022-01-01 RX ADMIN — ALBUTEROL SULFATE 2.5 MG: 2.5 SOLUTION RESPIRATORY (INHALATION) at 05:07

## 2022-01-01 RX ADMIN — ENOXAPARIN SODIUM 60 MG: 60 INJECTION SUBCUTANEOUS at 05:10

## 2022-01-01 RX ADMIN — CLONIDINE HYDROCHLORIDE 0.1 MG: 0.1 TABLET ORAL at 04:03

## 2022-01-01 RX ADMIN — HEPARIN SODIUM 5000 UNITS: 5000 INJECTION, SOLUTION INTRAVENOUS; SUBCUTANEOUS at 01:03

## 2022-01-01 RX ADMIN — FUROSEMIDE 20 MG: 10 INJECTION, SOLUTION INTRAVENOUS at 08:10

## 2022-01-01 RX ADMIN — LACTULOSE 20 G: 20 SOLUTION ORAL at 11:07

## 2022-01-01 RX ADMIN — DILTIAZEM HYDROCHLORIDE 180 MG: 180 CAPSULE, COATED, EXTENDED RELEASE ORAL at 09:07

## 2022-01-01 RX ADMIN — ATORVASTATIN CALCIUM 80 MG: 40 TABLET, FILM COATED ORAL at 08:03

## 2022-01-01 RX ADMIN — LACTULOSE 20 G: 20 SOLUTION ORAL at 02:10

## 2022-01-01 RX ADMIN — ALLOPURINOL 100 MG: 100 TABLET ORAL at 08:03

## 2022-01-01 RX ADMIN — SERTRALINE HYDROCHLORIDE 25 MG: 25 TABLET ORAL at 05:03

## 2022-01-01 RX ADMIN — MAGNESIUM OXIDE 400 MG: 400 TABLET ORAL at 09:01

## 2022-01-01 RX ADMIN — IPRATROPIUM BROMIDE AND ALBUTEROL SULFATE 3 ML: .5; 3 SOLUTION RESPIRATORY (INHALATION) at 01:01

## 2022-01-01 RX ADMIN — IPRATROPIUM BROMIDE AND ALBUTEROL SULFATE 3 ML: .5; 3 SOLUTION RESPIRATORY (INHALATION) at 08:01

## 2022-01-01 RX ADMIN — SODIUM CHLORIDE: 0.45 INJECTION, SOLUTION INTRAVENOUS at 10:03

## 2022-01-01 RX ADMIN — IPRATROPIUM BROMIDE AND ALBUTEROL SULFATE 3 ML: .5; 3 SOLUTION RESPIRATORY (INHALATION) at 06:10

## 2022-01-01 RX ADMIN — LEVALBUTEROL HYDROCHLORIDE 3.75 MG: 1.25 SOLUTION, CONCENTRATE RESPIRATORY (INHALATION) at 01:01

## 2022-01-01 RX ADMIN — FLUTICASONE PROPIONATE 100 MCG: 50 SPRAY, METERED NASAL at 09:03

## 2022-01-01 RX ADMIN — ONDANSETRON 4 MG: 2 INJECTION INTRAMUSCULAR; INTRAVENOUS at 09:10

## 2022-01-01 RX ADMIN — BUDESONIDE 0.5 MG: 0.5 INHALANT RESPIRATORY (INHALATION) at 08:10

## 2022-01-01 RX ADMIN — OXYCODONE HYDROCHLORIDE AND ACETAMINOPHEN 500 MG: 500 TABLET ORAL at 12:10

## 2022-01-01 RX ADMIN — PANTOPRAZOLE SODIUM 40 MG: 40 TABLET, DELAYED RELEASE ORAL at 06:03

## 2022-01-01 RX ADMIN — ALBUMIN (HUMAN) 12.5 G: 12.5 SOLUTION INTRAVENOUS at 12:10

## 2022-01-01 RX ADMIN — ALLOPURINOL 100 MG: 100 TABLET ORAL at 09:07

## 2022-01-01 RX ADMIN — DOCUSATE SODIUM 100 MG: 100 CAPSULE, LIQUID FILLED ORAL at 06:10

## 2022-01-01 RX ADMIN — ASPIRIN 81 MG: 81 TABLET, COATED ORAL at 03:10

## 2022-01-01 RX ADMIN — FUROSEMIDE 20 MG: 10 INJECTION, SOLUTION INTRAMUSCULAR; INTRAVENOUS at 12:10

## 2022-01-01 RX ADMIN — FERROUS SULFATE TAB 325 MG (65 MG ELEMENTAL FE) 1 EACH: 325 (65 FE) TAB at 09:03

## 2022-01-01 RX ADMIN — LOSARTAN POTASSIUM 25 MG: 25 TABLET, FILM COATED ORAL at 08:03

## 2022-01-01 RX ADMIN — EPOETIN ALFA-EPBX 3000 UNITS: 3000 INJECTION, SOLUTION INTRAVENOUS; SUBCUTANEOUS at 03:04

## 2022-01-01 RX ADMIN — AMLODIPINE BESYLATE 5 MG: 5 TABLET ORAL at 05:07

## 2022-01-01 RX ADMIN — MONTELUKAST 10 MG: 10 TABLET, FILM COATED ORAL at 09:01

## 2022-01-01 RX ADMIN — FUROSEMIDE 20 MG: 20 TABLET ORAL at 08:03

## 2022-01-01 RX ADMIN — SODIUM CHLORIDE 500 ML: 9 INJECTION, SOLUTION INTRAVENOUS at 09:07

## 2022-01-01 RX ADMIN — ASPIRIN 325 MG ORAL TABLET 325 MG: 325 PILL ORAL at 07:10

## 2022-01-01 RX ADMIN — RIVAROXABAN 10 MG: 10 TABLET, FILM COATED ORAL at 05:03

## 2022-01-01 RX ADMIN — LACTULOSE 20 G: 20 SOLUTION ORAL at 11:10

## 2022-01-01 RX ADMIN — LEVOFLOXACIN 500 MG: 500 INJECTION, SOLUTION INTRAVENOUS at 11:10

## 2022-01-01 RX ADMIN — LEVOFLOXACIN 500 MG: 500 INJECTION, SOLUTION INTRAVENOUS at 12:10

## 2022-01-01 RX ADMIN — IPRATROPIUM BROMIDE AND ALBUTEROL SULFATE 3 ML: .5; 3 SOLUTION RESPIRATORY (INHALATION) at 08:10

## 2022-01-01 RX ADMIN — AZITHROMYCIN 500 MG: 500 INJECTION, POWDER, LYOPHILIZED, FOR SOLUTION INTRAVENOUS at 06:01

## 2022-01-01 RX ADMIN — CLONIDINE HYDROCHLORIDE 0.1 MG: 0.1 TABLET ORAL at 12:03

## 2022-01-01 RX ADMIN — FUROSEMIDE 20 MG: 20 TABLET ORAL at 05:03

## 2022-01-01 RX ADMIN — LEVOFLOXACIN 750 MG: 5 INJECTION, SOLUTION INTRAVENOUS at 08:10

## 2022-01-01 RX ADMIN — SODIUM ZIRCONIUM CYCLOSILICATE 5 G: 5 POWDER, FOR SUSPENSION ORAL at 11:10

## 2022-01-01 RX ADMIN — IPRATROPIUM BROMIDE AND ALBUTEROL SULFATE 3 ML: .5; 3 SOLUTION RESPIRATORY (INHALATION) at 11:10

## 2022-01-01 RX ADMIN — ALBUMIN (HUMAN) 12.5 G: 12.5 SOLUTION INTRAVENOUS at 09:10

## 2022-01-01 RX ADMIN — DILTIAZEM HYDROCHLORIDE 180 MG: 180 CAPSULE, COATED, EXTENDED RELEASE ORAL at 05:03

## 2022-01-01 RX ADMIN — OXYCODONE HYDROCHLORIDE AND ACETAMINOPHEN 500 MG: 500 TABLET ORAL at 09:03

## 2022-01-01 RX ADMIN — EPOETIN ALFA-EPBX 3000 UNITS: 3000 INJECTION, SOLUTION INTRAVENOUS; SUBCUTANEOUS at 02:09

## 2022-01-01 RX ADMIN — ALBUTEROL SULFATE 2.5 MG: 2.5 SOLUTION RESPIRATORY (INHALATION) at 08:03

## 2022-01-01 RX ADMIN — AMLODIPINE BESYLATE 5 MG: 5 TABLET ORAL at 11:03

## 2022-01-01 RX ADMIN — ENOXAPARIN SODIUM 60 MG: 60 INJECTION SUBCUTANEOUS at 07:10

## 2022-01-01 RX ADMIN — FERROUS SULFATE TAB 325 MG (65 MG ELEMENTAL FE) 1 EACH: 325 (65 FE) TAB at 09:01

## 2022-01-01 RX ADMIN — Medication 6 MG: at 12:10

## 2022-01-01 RX ADMIN — EPOETIN ALFA-EPBX 3000 UNITS: 3000 INJECTION, SOLUTION INTRAVENOUS; SUBCUTANEOUS at 03:07

## 2022-01-01 RX ADMIN — MAGNESIUM OXIDE 400 MG: 400 TABLET ORAL at 05:03

## 2022-01-01 RX ADMIN — MAGNESIUM OXIDE 400 MG: 400 TABLET ORAL at 08:03

## 2022-01-01 RX ADMIN — FERROUS SULFATE TAB 325 MG (65 MG ELEMENTAL FE) 1 EACH: 325 (65 FE) TAB at 08:03

## 2022-01-01 RX ADMIN — ATORVASTATIN CALCIUM 20 MG: 20 TABLET, FILM COATED ORAL at 08:03

## 2022-01-01 RX ADMIN — NICARDIPINE HYDROCHLORIDE 5 MG/HR: 0.2 INJECTION INTRAVENOUS at 09:03

## 2022-01-01 RX ADMIN — EPOETIN ALFA-EPBX 3000 UNITS: 3000 INJECTION, SOLUTION INTRAVENOUS; SUBCUTANEOUS at 08:07

## 2022-01-01 RX ADMIN — ONDANSETRON 4 MG: 2 INJECTION INTRAMUSCULAR; INTRAVENOUS at 11:10

## 2022-01-01 RX ADMIN — SODIUM POLYSTYRENE SULFONATE 15 G: 15 SUSPENSION ORAL; RECTAL at 08:07

## 2022-01-01 RX ADMIN — ATORVASTATIN CALCIUM 20 MG: 20 TABLET, FILM COATED ORAL at 05:03

## 2022-01-01 RX ADMIN — ALLOPURINOL 50 MG: 300 TABLET ORAL at 09:03

## 2022-01-01 RX ADMIN — FUROSEMIDE 20 MG: 10 INJECTION, SOLUTION INTRAVENOUS at 04:10

## 2022-01-01 RX ADMIN — LOSARTAN POTASSIUM 25 MG: 25 TABLET, FILM COATED ORAL at 12:10

## 2022-01-01 RX ADMIN — DILTIAZEM HYDROCHLORIDE 180 MG: 180 CAPSULE, COATED, EXTENDED RELEASE ORAL at 09:01

## 2022-01-01 RX ADMIN — IPRATROPIUM BROMIDE AND ALBUTEROL SULFATE 3 ML: .5; 3 SOLUTION RESPIRATORY (INHALATION) at 03:03

## 2022-01-01 RX ADMIN — LACTULOSE 20 G: 20 SOLUTION ORAL at 09:07

## 2022-01-01 RX ADMIN — POTASSIUM CHLORIDE 20 MEQ: 20 TABLET, EXTENDED RELEASE ORAL at 08:03

## 2022-01-01 RX ADMIN — ENOXAPARIN SODIUM 60 MG: 60 INJECTION SUBCUTANEOUS at 11:10

## 2022-01-01 RX ADMIN — MONTELUKAST 10 MG: 10 TABLET, FILM COATED ORAL at 09:03

## 2022-01-01 RX ADMIN — FUROSEMIDE 20 MG: 10 INJECTION, SOLUTION INTRAMUSCULAR; INTRAVENOUS at 08:10

## 2022-01-01 RX ADMIN — SODIUM CHLORIDE: 0.45 INJECTION, SOLUTION INTRAVENOUS at 05:03

## 2022-01-01 RX ADMIN — ALLOPURINOL 100 MG: 100 TABLET ORAL at 09:01

## 2022-01-01 RX ADMIN — EPOETIN ALFA-EPBX 3000 UNITS: 3000 INJECTION, SOLUTION INTRAVENOUS; SUBCUTANEOUS at 01:03

## 2022-01-01 RX ADMIN — DILTIAZEM HYDROCHLORIDE 180 MG: 180 CAPSULE, COATED, EXTENDED RELEASE ORAL at 12:10

## 2022-01-01 RX ADMIN — SERTRALINE HYDROCHLORIDE 50 MG: 50 TABLET ORAL at 12:10

## 2022-01-01 RX ADMIN — SODIUM CHLORIDE: 0.9 INJECTION, SOLUTION INTRAVENOUS at 05:07

## 2022-01-01 RX ADMIN — Medication 6 MG: at 10:10

## 2022-01-07 NOTE — TELEPHONE ENCOUNTER
----- Message from Edith Khan sent at 10/17/2017  1:22 PM CDT -----  Contact: Bethanie mcnally/[facility]  Patient admitted to home health service for COPD and nursing therapy, will have and EGD on 10/25/17, patient is holding Asprin contact Bethanie at [phone].    Thank yo    [place].../Calf...

## 2022-01-11 NOTE — ED PROVIDER NOTES
"Encounter Date: 1/11/2022       History     Chief Complaint   Patient presents with    Shortness of Breath     Coughed up yellow sputum at home felt like "choking"     89-year-old female who has a history of CHF, COPD, anemia, diabetes, GERD, presents emergency room with complaints of having some cough 3 weeks duration.  Patient states that she started having purulent sputum production today.  No chest pain.  No worsening shortness of breath.  Patient states she stopped smoking 8 years ago.  No chills or fever.  No complaints of any dependent edema.  No palpitations currently.  No complaint of any headache earache or sore throat.  No dysphagia.  She denies any flank pain or dysuria.  Patient states that previously her cardiologist was Dr. Herbert but now is seeing Dr. De La Fuente.        Review of patient's allergies indicates:   Allergen Reactions    Carvedilol Other (See Comments)     Bradycardia and syncope    Boniva [ibandronate]     Codeine     Hydralazine analogues     Iodinated contrast media Hives    Kionex [sodium polystyrene sulfonate] Diarrhea     From encounter 12/11/17 for diarrhea--not true allergy.    Lisinopril     Morphine      Past Medical History:   Diagnosis Date    Anemia due to multiple mechanisms 6/29/2018    Anemia, chronic disease 6/29/2018    Anemia, chronic renal failure, stage 3 (moderate) 6/29/2018    Anticoagulant long-term use     aspirin    Aortic aneurysm     Chest pain     CHF (congestive heart failure)     COPD (chronic obstructive pulmonary disease)     COPD with acute exacerbation 1/9/2015    Depression     Diabetes mellitus type II     no longer on any DM meds    DVT (deep venous thrombosis) 06/09/2018    Encounter for blood transfusion     GERD (gastroesophageal reflux disease)     GI bleed     Gout     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 8/7/2018    Hip arthritis 3/1/2016    Homocysteinemia 8/7/2018    Hyperlipidemia     Hypertension     Incontinent of " urine     Normocytic normochromic anemia 6/29/2018    Oxygen dependent     use oxygen as needed    PE (pulmonary thromboembolism) 06/09/2018    Pneumonia of right lower lobe due to infectious organism 9/11/2017    Syncope     Thyroid disease     hypothyroid    Type 2 diabetes mellitus with stage 3 chronic kidney disease 1/18/2013    UTI (urinary tract infection)      Past Surgical History:   Procedure Laterality Date    APPENDECTOMY      CHOLECYSTECTOMY      COLONOSCOPY Left 10/29/2020    Procedure: COLONOSCOPY;  Surgeon: Singh Kee III, MD;  Location: [facility];  Service: Endoscopy;  Laterality: Left;    ESOPHAGOGASTRODUODENOSCOPY Left 10/26/2020    Procedure: EGD (ESOPHAGOGASTRODUODENOSCOPY);  Surgeon: Kareem Gramajo MD;  Location: [facility];  Service: Endoscopy;  Laterality: Left;    ESOPHAGOGASTRODUODENOSCOPY Left 10/28/2020    Procedure: EGD (ESOPHAGOGASTRODUODENOSCOPY);  Surgeon: Kareem Gramajo MD;  Location: [facility];  Service: Endoscopy;  Laterality: Left;    ESOPHAGOGASTRODUODENOSCOPY N/A 9/16/2021    Procedure: EGD (ESOPHAGOGASTRODUODENOSCOPY);  Surgeon: Kareem Gramajo MD;  Location: [facility];  Service: Endoscopy;  Laterality: N/A;    FRACTURE SURGERY      right hip     HERNIA REPAIR      groin    HYSTERECTOMY      INSERTION OF IMPLANTABLE LOOP RECORDER N/A 12/12/2018    Procedure: Insertion, Implantable Loop Recorder;  Surgeon: Ashok Herbert MD;  Location: [facility] CATH LAB;  Service: Cardiology;  Laterality: N/A;    PERCUTANEOUS PINNING OF HIP Left 3/23/2019    Procedure: PINNING, HIP, PERCUTANEOUS;  Surgeon: Jorje Hamlin MD;  Location: [facility] OR;  Service: Orthopedics;  Laterality: Left;    SMALL BOWEL ENTEROSCOPY N/A 10/29/2020    Procedure: ENTEROSCOPY;  Surgeon: Singh Kee III, MD;  Location: [facility];  Service: Endoscopy;  Laterality: N/A;    VARICOSE VEIN SURGERY       Family History   Problem Relation Age of Onset    Hypertension Mother     Heart  disease Mother     Lung disease Father     Diabetes Sister     Diabetes Brother     Kidney disease Son      Social History     Tobacco Use    Smoking status: Former Smoker     Packs/day: 0.35     Years: 44.00     Pack years: 15.40     Types: Cigarettes     Start date: 1970    Smokeless tobacco: Never Used    Tobacco comment: 1/08/2015   Substance Use Topics    Alcohol use: No     Alcohol/week: 0.0 standard drinks    Drug use: No     Review of Systems   Constitutional: Negative for appetite change, chills, diaphoresis and fever.   HENT: Negative.  Negative for sore throat.    Respiratory: Positive for cough and shortness of breath.    Cardiovascular: Negative for chest pain, palpitations and leg swelling.   Gastrointestinal: Negative for abdominal pain, nausea and vomiting.   Genitourinary: Negative for difficulty urinating and dysuria.   Musculoskeletal: Negative for back pain.   Skin: Negative.  Negative for rash.   Neurological: Negative for weakness and headaches.   Hematological: Does not bruise/bleed easily.   All other systems reviewed and are negative.      Physical Exam     Initial Vitals [01/11/22 1348]   BP Pulse Resp Temp SpO2   (!) 195/80 67 18 97.2 °F (36.2 °C) 100 %      MAP       --         Physical Exam    Vitals reviewed.  Constitutional: She appears well-developed and well-nourished. She is not diaphoretic. No distress.   HENT:   Head: Normocephalic and atraumatic.   Nose: Nose normal.   Mouth/Throat: Oropharynx is clear and moist. No oropharyngeal exudate.   Eyes: Conjunctivae are normal. Pupils are equal, round, and reactive to light. Right eye exhibits no discharge. Left eye exhibits no discharge. No scleral icterus.   Neck: Neck supple. No thyromegaly present. No JVD present.   Normal range of motion.  Cardiovascular: Normal rate, regular rhythm, normal heart sounds and intact distal pulses. Exam reveals no gallop and no friction rub.    No murmur heard.  Pulmonary/Chest: No  respiratory distress. She has wheezes. She has rhonchi. She has rales. She exhibits no tenderness.   Abdominal: Abdomen is soft. Bowel sounds are normal. She exhibits no distension and no mass. There is no abdominal tenderness. There is no rebound and no guarding.   Musculoskeletal:         General: No tenderness or edema. Normal range of motion.      Cervical back: Normal range of motion and neck supple.     Neurological: She is alert and oriented to person, place, and time. She has normal strength. GCS score is 15. GCS eye subscore is 4. GCS verbal subscore is 5. GCS motor subscore is 6.   Skin: Skin is warm and dry. Capillary refill takes less than 2 seconds. No rash noted. No erythema. No pallor.   Psychiatric: She has a normal mood and affect. Her behavior is normal. Judgment and thought content normal.         ED Course   Procedures  Labs Reviewed   CBC W/ AUTO DIFFERENTIAL - Abnormal; Notable for the following components:       Result Value    RBC 3.00 (*)     Hemoglobin 9.3 (*)     Hematocrit 29.2 (*)     MCHC 31.8 (*)     Gran # (ANC) 7.9 (*)     Immature Grans (Abs) 0.05 (*)     Lymph # 0.8 (*)     Gran % 79.2 (*)     Lymph % 8.5 (*)     All other components within normal limits   COMPREHENSIVE METABOLIC PANEL - Abnormal; Notable for the following components:    CO2 22 (*)     Glucose 119 (*)     BUN 31 (*)     Creatinine 1.8 (*)     Calcium 8.4 (*)     Total Protein 5.9 (*)     Albumin 2.5 (*)     eGFR if  28.0 (*)     eGFR if non  24.3 (*)     All other components within normal limits   B-TYPE NATRIURETIC PEPTIDE - Abnormal; Notable for the following components:     (*)     All other components within normal limits   LACTIC ACID, PLASMA   URINALYSIS, REFLEX TO URINE CULTURE        ECG Results          EKG 12-lead (In process)  Result time 01/11/22 15:46:13    In process by Interface, Lab In [facility] (01/11/22 15:46:13)                 Narrative:    Test Reason :  R05.9,    Vent. Rate : 077 BPM     Atrial Rate : 077 BPM     P-R Int : 182 ms          QRS Dur : 086 ms      QT Int : 390 ms       P-R-T Axes : 066 023 055 degrees     QTc Int : 441 ms    Normal sinus rhythm with sinus arrhythmia  Nonspecific ST abnormality  Abnormal ECG  When compared with ECG of 16-NOV-2021 14:50,  Premature atrial complexes are no longer Present  Minimal criteria for Septal infarct are no longer Present    Referred By: [email or web address]   SELF           Confirmed By:                             Imaging Results          X-Ray Chest AP Portable (Final result)  Result time 01/11/22 14:43:53    Final result by Ariel Barr MD (01/11/22 14:43:53)                 Narrative:    Chest single view    CLINICAL DATA: Productive cough    FINDINGS: AP view is compared to October 2021.    Heart size is normal. The aortic arch is calcified. Loop recorder device is noted.    There is mild parenchymal scarring at the right lung base. No acute infiltrate or effusion is identified.    IMPRESSION:  1. Chronic findings as above. No acute radiographic abnormalities are identified.    Electronically signed by:  Ariel Barr MD  1/11/2022 2:43 PM CST Workstation: [id number]                               Medications   albuterol-ipratropium 2.5 mg-0.5 mg/3 mL nebulizer solution 9 mL (9 mLs Nebulization Given 1/11/22 3108)                Attending Attestation:             Attending ED Notes:   89 Year old female who presented with complaints of having a appearance sputum production with cough, has a chest x-ray not showing any evidence of any consolidation.  The patient's oxygen saturation on 2 L cannula is 100% and this is which she normally uses.  Screening labs obtained showed a normal lactate.  A BNP is 557 which is in similar range that she normally has.  BUN creatinine are mildly elevated  which is again range which she had been previously.  BUN is 31 creatinine is 1.8.  Her H&H is low at 9.3 and 29.2 which is  again similar to where she had been previously.  In light of those findings the patient will not require hospitalization and will be treated for bronchitic symptoms as an outpatient with oral antibiotics.      ED Course as of 01/11/22 1746   Tue Jan 11, 2022   1544 BNP(!): 557 [LG]      ED Course User Index  [LG] Navin Lemon Jr., MD             Clinical Impression:   Final diagnoses:  [R05.9] Cough  [J40] Bronchitis (Primary)          ED Disposition Condition    Discharge Stable        ED Prescriptions     Medication Sig Dispense Start Date End Date Auth. Provider    doxycycline (VIBRAMYCIN) 100 MG Cap Take 1 capsule (100 mg total) by mouth 2 (two) times daily. for 10 days 20 capsule 1/11/2022 1/21/2022 Navin Lemon Jr., MD        Follow-up Information     Follow up With Specialties Details Why Contact Info    Eli Edmonds MD Family Medicine Schedule an appointment as soon as possible for a visit in 1 week For recheck [id number] [facility]  [place] [address]  [phone]             Navin Lemon Jr., MD  01/11/22 1747

## 2022-01-11 NOTE — DISCHARGE INSTRUCTIONS
Continue routine medication.  Watch for any fever, shortness of breath, chest pain, nausea vomiting.

## 2022-01-13 PROBLEM — I50.33 ACUTE ON CHRONIC DIASTOLIC HEART FAILURE: Status: RESOLVED | Noted: 2021-10-19 | Resolved: 2022-01-01

## 2022-01-13 PROBLEM — K92.2 GI BLEED: Status: RESOLVED | Noted: 2020-10-25 | Resolved: 2022-01-01

## 2022-01-13 PROBLEM — K92.2 GASTROINTESTINAL HEMORRHAGE: Status: RESOLVED | Noted: 2020-10-28 | Resolved: 2022-01-01

## 2022-01-13 PROBLEM — E87.5 HYPERKALEMIA: Status: RESOLVED | Noted: 2018-09-13 | Resolved: 2022-01-01

## 2022-01-13 PROBLEM — S72.001A CLOSED RIGHT HIP FRACTURE: Status: RESOLVED | Noted: 2021-07-04 | Resolved: 2022-01-01

## 2022-01-13 NOTE — ED PROVIDER NOTES
Encounter Date: 1/13/2022       History     Chief Complaint   Patient presents with    Shortness of Breath     Patient dx with bronchitis here 2 days ago, reports feeling worse now, more SOB even with oxygen      Patient with history of COPD and congestive heart failure.  Patient is on 2 L nasal cannula home.  Patient was seen here 2 days ago with cough.  Patient was sent with cough medication.  Patient continues with cough of yellowish sputum.  Patient is currently on doxycycline.  There is no fever chills.  No chest pain.  No pleurisy hemoptysis.        Review of patient's allergies indicates:   Allergen Reactions    Carvedilol Other (See Comments)     Bradycardia and syncope    Boniva [ibandronate]     Codeine     Hydralazine analogues     Iodinated contrast media Hives    Kionex [sodium polystyrene sulfonate] Diarrhea     From encounter 12/11/17 for diarrhea--not true allergy.    Lisinopril     Morphine      Past Medical History:   Diagnosis Date    Anemia due to multiple mechanisms 6/29/2018    Anemia, chronic disease 6/29/2018    Anemia, chronic renal failure, stage 3 (moderate) 6/29/2018    Anticoagulant long-term use     aspirin    Aortic aneurysm     Chest pain     CHF (congestive heart failure)     COPD (chronic obstructive pulmonary disease)     COPD with acute exacerbation 1/9/2015    Depression     Diabetes mellitus type II     no longer on any DM meds    DVT (deep venous thrombosis) 06/09/2018    Encounter for blood transfusion     GERD (gastroesophageal reflux disease)     GI bleed     Gout     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 8/7/2018    Hip arthritis 3/1/2016    Homocysteinemia 8/7/2018    Hyperlipidemia     Hypertension     Incontinent of urine     Normocytic normochromic anemia 6/29/2018    Oxygen dependent     use oxygen as needed    PE (pulmonary thromboembolism) 06/09/2018    Pneumonia of right lower lobe due to infectious organism 9/11/2017    Syncope      Thyroid disease     hypothyroid    Type 2 diabetes mellitus with stage 3 chronic kidney disease 1/18/2013    UTI (urinary tract infection)      Past Surgical History:   Procedure Laterality Date    APPENDECTOMY      CHOLECYSTECTOMY      COLONOSCOPY Left 10/29/2020    Procedure: COLONOSCOPY;  Surgeon: Singh Kee III, MD;  Location: [facility];  Service: Endoscopy;  Laterality: Left;    ESOPHAGOGASTRODUODENOSCOPY Left 10/26/2020    Procedure: EGD (ESOPHAGOGASTRODUODENOSCOPY);  Surgeon: Kareem Gramajo MD;  Location: [facility] ENDO;  Service: Endoscopy;  Laterality: Left;    ESOPHAGOGASTRODUODENOSCOPY Left 10/28/2020    Procedure: EGD (ESOPHAGOGASTRODUODENOSCOPY);  Surgeon: Kareem Gramajo MD;  Location: [facility] ENDO;  Service: Endoscopy;  Laterality: Left;    ESOPHAGOGASTRODUODENOSCOPY N/A 9/16/2021    Procedure: EGD (ESOPHAGOGASTRODUODENOSCOPY);  Surgeon: Kareem Gramajo MD;  Location: [facility];  Service: Endoscopy;  Laterality: N/A;    FRACTURE SURGERY      right hip     HERNIA REPAIR      groin    HYSTERECTOMY      INSERTION OF IMPLANTABLE LOOP RECORDER N/A 12/12/2018    Procedure: Insertion, Implantable Loop Recorder;  Surgeon: Ashok Herbert MD;  Location: [facility] CATH LAB;  Service: Cardiology;  Laterality: N/A;    PERCUTANEOUS PINNING OF HIP Left 3/23/2019    Procedure: PINNING, HIP, PERCUTANEOUS;  Surgeon: Jorje Hamlin MD;  Location: [facility] OR;  Service: Orthopedics;  Laterality: Left;    SMALL BOWEL ENTEROSCOPY N/A 10/29/2020    Procedure: ENTEROSCOPY;  Surgeon: Singh Kee III, MD;  Location: [facility];  Service: Endoscopy;  Laterality: N/A;    VARICOSE VEIN SURGERY       Family History   Problem Relation Age of Onset    Hypertension Mother     Heart disease Mother     Lung disease Father     Diabetes Sister     Diabetes Brother     Kidney disease Son      Social History     Tobacco Use    Smoking status: Former Smoker     Packs/day: 0.35     Years: 44.00     Pack years:  15.40     Types: Cigarettes     Start date: 1970    Smokeless tobacco: Never Used    Tobacco comment: 1/08/2015   Substance Use Topics    Alcohol use: No     Alcohol/week: 0.0 standard drinks    Drug use: No     Review of Systems   Constitutional: Negative for chills and fever.   HENT: Positive for congestion.    Eyes: Negative for visual disturbance.   Respiratory: Positive for cough.    Cardiovascular: Negative for chest pain and palpitations.   Gastrointestinal: Negative for abdominal pain and vomiting.   Genitourinary: Negative for dysuria.   Musculoskeletal: Negative for joint swelling.   Neurological: Negative for headaches.   Psychiatric/Behavioral: Negative for confusion.       Physical Exam     Initial Vitals [01/13/22 1200]   BP Pulse Resp Temp SpO2   (!) 149/87 64 20 97.7 °F (36.5 °C) 96 %      MAP       --         Physical Exam    Nursing note and vitals reviewed.  Constitutional: She is not diaphoretic. No distress.   HENT:   Head: Normocephalic and atraumatic.   Eyes: Conjunctivae are normal.   Neck:   Normal range of motion.  Cardiovascular: Normal rate.   Pulmonary/Chest:   Moderate air movement with diffuse inspiratory expiratory wheezing   Abdominal: Abdomen is soft. There is no abdominal tenderness.   Musculoskeletal:         General: Normal range of motion.      Cervical back: Normal range of motion.     Neurological: She is alert. She has normal strength. No cranial nerve deficit or sensory deficit.   No gross deficits   Skin: No rash noted.   Psychiatric: She has a normal mood and affect.         ED Course   Procedures  Labs Reviewed   CK - Abnormal; Notable for the following components:       Result Value     (*)     All other components within normal limits   CBC W/ AUTO DIFFERENTIAL - Abnormal; Notable for the following components:    RBC 3.14 (*)     Hemoglobin 9.6 (*)     Hematocrit 30.3 (*)     MCHC 31.7 (*)     Immature Granulocytes 0.7 (*)     Immature Grans (Abs) 0.06 (*)      Eos # 0.8 (*)     Lymph % 15.1 (*)     Eosinophil % 9.0 (*)     All other components within normal limits   COMPREHENSIVE METABOLIC PANEL - Abnormal; Notable for the following components:    Creatinine 1.7 (*)     Total Protein 5.7 (*)     Albumin 2.4 (*)     eGFR if  30.0 (*)     eGFR if non  26.0 (*)     All other components within normal limits   TROPONIN I - Abnormal; Notable for the following components:    Troponin I 0.059 (*)     All other components within normal limits   B-TYPE NATRIURETIC PEPTIDE - Abnormal; Notable for the following components:     (*)     All other components within normal limits   CULTURE, BLOOD   CULTURE, BLOOD   SARS-COV-2 RNA AMPLIFICATION, QUAL   CK-MB   MAGNESIUM   PROCALCITONIN   PROCALCITONIN   URINALYSIS, REFLEX TO URINE CULTURE   LACTIC ACID, PLASMA          Imaging Results          X-Ray Chest AP Portable (Final result)  Result time 01/13/22 12:46:19    Final result by Jonna Lyn MD (01/13/22 12:46:19)                 Narrative:    Portable chest x-ray at 2:15 PM is compared to prior study of 1/11/2022    Clinical history is cough    The heart is enlarged. There is stable scarring in the right lung base. There are no new infiltrates or pleural effusions. There is a heart monitor in place.    There are surgical clips in the right axilla.    IMPRESSION: Cardiomegaly with atelectasis in the right lung base    No acute pulmonary process    Electronically signed by:  Jonna Lyn MD  1/13/2022 12:46 PM CST Workstation: [id number]                               Medications   methylPREDNISolone sodium succinate injection 125 mg (has no administration in time range)   cefTRIAXone (ROCEPHIN) 2 g/50 mL D5W IVPB (has no administration in time range)   azithromycin 500 mg in dextrose 5 % 250 mL IVPB (ready to mix system) (has no administration in time range)   levalbuterol nebulizer solution 3.75 mg (3.75 mg Nebulization Given  1/13/22 1328)   ipratropium 0.02 % nebulizer solution 1 mg (1 mg Nebulization Given 1/13/22 1328)     Medical Decision Making:   History:   Old Medical Records: I decided to obtain old medical records.  Clinical Tests:   Lab Tests: Reviewed  Radiological Study: Reviewed  Medical Tests: Reviewed  ED Management:  Patient presents with shortness breath and wheezing.  Patient with possible right lower lung zone infiltrate.  Will give antibiotic.  Check procalcitonin.  Patient with indeterminate troponin.  Patient will need serial cardiac enzymes to rule out cardiac of bronchospasm.  BNP is at baseline.                      Clinical Impression:   Final diagnoses:  [R06.02] Shortness of breath  [J44.1] COPD exacerbation (Primary)  [R77.8] Elevated troponin          ED Disposition Condition    Admit               Singh Solis MD  01/13/22 4750

## 2022-01-14 NOTE — DISCHARGE INSTRUCTIONS
Please continue taking your Doxycycline antibiotic as previously prescribed until it is finished.  Prednisone, a steroid, has been prescribed to you to take once daily for the next 5 days.

## 2022-01-14 NOTE — PLAN OF CARE
[facility]  Initial Discharge Assessment       Primary Care Provider: Eli Edmonds MD    Admission Diagnosis: COPD exacerbation [J44.1]    Admission Date: 1/13/2022  Expected Discharge Date: 1/14/2022    Discharge Barriers Identified: None    Payor: [employer]ARE / Plan: [employer] 65 / Product Type: Medicare Advantage /     Extended Emergency Contact Information  Primary Emergency Contact: Annabella Guerra  Address: [address]           [place] [address] [place]  Home Phone: [phone]  Mobile Phone: [phone]  Relation: Daughter  Preferred language: English   needed? No  Secondary Emergency Contact: Toya Hendrickson  Address: [address] [place] [address] [place]  Home Phone: [phone]  Mobile Phone: [phone]  Relation: Daughter  Preferred language: English   needed? No    Discharge Plan A: Home Health  Discharge Plan B: Home Health      [id number]  [place] - [address]  [address]  [facility] [place] [address]  Phone: [phone] Fax: [phone]    [facility]  [address]  [facility] [address]  Phone: [phone] Fax: [phone]      Initial Assessment (most recent)     Adult Discharge Assessment - 01/14/22 1242        Discharge Assessment    Assessment Type Discharge Planning Assessment     Confirmed/corrected address, phone number and insurance Yes     Confirmed Demographics Correct on Facesheet     Source of Information health record;patient;family     Does patient/caregiver understand observation status Yes     Communicated ELYSSA with patient/caregiver Yes     Lives With child(shreyas), adult     Facility Arrived From: Home     Do you expect to return to your current living situation? Yes     Do you have help at home or someone to help you manage your care at home? Yes     Who are your caregiver(s) and their phone number(s)?  Annabella Guerra, daughter ([phone])     Prior to hospitilization cognitive status: Alert/Oriented     Current cognitive status: Alert/Oriented     Walking or Climbing Stairs Difficulty ambulation difficulty, requires equipment     Dressing/Bathing Difficulty bathing difficulty, assistance 1 person;dressing difficulty, assistance 1 person     Equipment Currently Used at Home rollator;oxygen;bedside commode     Readmission within 30 days? No     Patient currently being followed by outpatient case management? Yes     If yes, name of outpatient case management following: insurance company assigned oupatient case management     Do you currently have service(s) that help you manage your care at home? Yes     Name and Contact number of agency [facility]     Is the pt/caregiver preference to resume services with current agency Yes     Do you take prescription medications? Yes     Do you have prescription coverage? Yes     Do you have any problems affording any of your prescribed medications? No     Is the patient taking medications as prescribed? yes     Who is going to help you get home at discharge? Annabella Guerra, daughter ([phone])   Toya Cousin, daughter ([phone])     How do you get to doctors appointments? family or friend will provide     Are you on dialysis? No     Do you take coumadin? No     Discharge Plan A Home Health     Discharge Plan B Home Health     DME Needed Upon Discharge  none     Discharge Plan discussed with: Adult children     Discharge Barriers Identified None        Relationship/Environment    Name(s) of Who Lives With Patient Annabella Guerra, daughter ([phone])

## 2022-01-14 NOTE — H&P
Formerly [facility], [facility] Medicine  History & Physical    DOS: 01/13/2022  2:40 PM      Patient Name: Blaire Cage  MRN: [id number]  Patient Class: OP- Observation  Admission Date: 1/13/2022  Attending Physician: Dr. Fu  Primary Care Provider: Eli Edmonds MD         Patient information was obtained from patient, relative(s) and ER records.     Subjective:     Principal Problem:Acute exacerbation of chronic obstructive pulmonary disease (COPD)    Chief Complaint:   Chief Complaint   Patient presents with    Shortness of Breath     Patient dx with bronchitis here 2 days ago, reports feeling worse now, more SOB even with oxygen         HPI: Ms. Cage is a 89 year-old female with history of CKDIII, HFpEF 55% with PASP 50mmHg, COPD, anemia, diabetes mellitus (hemoglobin A1c 5.6 on 06/23/2021), DVT, PE, MTHFR mutation on rivaraxoban, hypothyroidism, COVID19 (9/2021), chronic resp failure on home O2@2L, and gastric antral vascular ectasia who presents today with complaints of SOB. It is moderate. It is associated with wheezing, cough - productive of white sputum, and weakness. She denies fever, chills, N/V/D, chest pain, or orthopnea. She was seen yesterday in the ED and started on doxycycline for acute bronchitis and sent home. She had a difficult night and the SOB worsened so she returned. Procal is negative CXR with atelectasis in the right lower lung base. She was given steroids and duonebs in the ED, but continues to wheeze.      Past Medical History:   Diagnosis Date    Anemia due to multiple mechanisms 6/29/2018    Anemia, chronic disease 6/29/2018    Anemia, chronic renal failure, stage 3 (moderate) 6/29/2018    Anticoagulant long-term use     aspirin    Aortic aneurysm     Chest pain     CHF (congestive heart failure)     COPD (chronic obstructive pulmonary disease)     COPD with acute exacerbation 1/9/2015    Depression     Diabetes mellitus type II     no longer on any DM meds     DVT (deep venous thrombosis) 06/09/2018    Encounter for blood transfusion     GERD (gastroesophageal reflux disease)     GI bleed     Gout     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 8/7/2018    Hip arthritis 3/1/2016    Homocysteinemia 8/7/2018    Hyperlipidemia     Hypertension     Incontinent of urine     Normocytic normochromic anemia 6/29/2018    Oxygen dependent     use oxygen as needed    PE (pulmonary thromboembolism) 06/09/2018    Pneumonia of right lower lobe due to infectious organism 9/11/2017    Syncope     Thyroid disease     hypothyroid    Type 2 diabetes mellitus with stage 3 chronic kidney disease 1/18/2013    UTI (urinary tract infection)        Past Surgical History:   Procedure Laterality Date    APPENDECTOMY      CHOLECYSTECTOMY      COLONOSCOPY Left 10/29/2020    Procedure: COLONOSCOPY;  Surgeon: Singh Kee III, MD;  Location: [facility];  Service: Endoscopy;  Laterality: Left;    ESOPHAGOGASTRODUODENOSCOPY Left 10/26/2020    Procedure: EGD (ESOPHAGOGASTRODUODENOSCOPY);  Surgeon: Kareem Gramajo MD;  Location: [facility];  Service: Endoscopy;  Laterality: Left;    ESOPHAGOGASTRODUODENOSCOPY Left 10/28/2020    Procedure: EGD (ESOPHAGOGASTRODUODENOSCOPY);  Surgeon: Kareem Gramajo MD;  Location: [facility];  Service: Endoscopy;  Laterality: Left;    ESOPHAGOGASTRODUODENOSCOPY N/A 9/16/2021    Procedure: EGD (ESOPHAGOGASTRODUODENOSCOPY);  Surgeon: Kareem Gramajo MD;  Location: [facility];  Service: Endoscopy;  Laterality: N/A;    FRACTURE SURGERY      right hip     HERNIA REPAIR      groin    HYSTERECTOMY      INSERTION OF IMPLANTABLE LOOP RECORDER N/A 12/12/2018    Procedure: Insertion, Implantable Loop Recorder;  Surgeon: Ashok Herbert MD;  Location: [facility] CATH LAB;  Service: Cardiology;  Laterality: N/A;    PERCUTANEOUS PINNING OF HIP Left 3/23/2019    Procedure: PINNING, HIP, PERCUTANEOUS;  Surgeon: Jorje Hamlin MD;  Location: [facility] OR;  Service:  Orthopedics;  Laterality: Left;    SMALL BOWEL ENTEROSCOPY N/A 10/29/2020    Procedure: ENTEROSCOPY;  Surgeon: Singh Kee III, MD;  Location: [facility];  Service: Endoscopy;  Laterality: N/A;    VARICOSE VEIN SURGERY         Review of patient's allergies indicates:   Allergen Reactions    Carvedilol Other (See Comments)     Bradycardia and syncope    Boniva [ibandronate]     Codeine     Hydralazine analogues     Iodinated contrast media Hives    Kionex [sodium polystyrene sulfonate] Diarrhea     From encounter 12/11/17 for diarrhea--not true allergy.    Lisinopril     Morphine        No current facility-administered medications on file prior to encounter.     Current Outpatient Medications on File Prior to Encounter   Medication Sig    albuterol (PROVENTIL) 2.5 mg /3 mL (0.083 %) nebulizer solution USE 1 VIAL IN NEBULIZER EVERY 6 HOURS AS NEEDED FOR WHEEZING. RESCUE (Patient taking differently: Take 2.5 mg by nebulization every 6 (six) hours as needed.)    allopurinoL (ZYLOPRIM) 100 MG tablet Take 100 mg by mouth once daily.    amLODIPine (NORVASC) 5 MG tablet Take 5 mg by mouth 2 (two) times daily.    ascorbic acid, vitamin C, (VITAMIN C) 500 MG tablet Take 500 mg by mouth once daily.    calcium carbonate (OS-TASIA) 500 mg calcium (1,250 mg) tablet Take 1 tablet by mouth 2 (two) times daily.    diclofenac sodium (VOLTAREN) 1 % Gel Apply 2 g topically once daily. (Patient taking differently: Apply 2 g topically as needed.)    diltiaZEM (CARDIZEM CD) 180 MG 24 hr capsule Take 1 capsule (180 mg total) by mouth once daily.    doxycycline (VIBRAMYCIN) 100 MG Cap Take 1 capsule (100 mg total) by mouth 2 (two) times daily. for 10 days    ferrous sulfate (FEOSOL) 325 mg (65 mg iron) Tab tablet Take 325 mg by mouth once daily.    fish oil-omega-3 fatty acids 300-1,000 mg capsule Take 2 g by mouth every evening.    fluticasone (FLONASE) 50 mcg/actuation nasal spray 2 sprays by Each Nare route once  daily. (Patient taking differently: 2 sprays by Each Nostril route as needed.)    folic acid-vit B6-vit B12 2.5-25-2 mg (FOLBIC) 2.5-25-2 mg Tab Take 1 tablet by mouth once daily.    furosemide (LASIX) 20 MG tablet Take 1 tablet only as needed, for swelling, increase SOB, weight gain of 3 lbs overnight or 5 lbs for the week (Patient taking differently: Take 20 mg by mouth as needed. Take 1 tablet only as needed, for swelling, increase SOB, weight gain of 3 lbs overnight or 5 lbs for the week)    ketoconazole (NIZORAL) 2 % shampoo Apply topically twice a week. (Patient taking differently: Apply 1 application topically twice a week.)    levothyroxine (SYNTHROID) 88 MCG tablet Take 1 tablet (88 mcg total) by mouth once daily.    losartan (COZAAR) 25 MG tablet Take 1 tablet (25 mg total) by mouth once daily.    magnesium oxide (MAG-OX) 400 mg (241.3 mg magnesium) tablet Take 1 tablet by mouth once daily (Patient taking differently: Take 400 mg by mouth once daily.)    montelukast (SINGULAIR) 10 mg tablet Take 1 tablet (10 mg total) by mouth every evening. (Patient taking differently: Take 10 mg by mouth nightly as needed.)    multivitamin (THERAGRAN) tablet Take 1 tablet by mouth once daily.    omeprazole (PRILOSEC) 40 MG capsule Take 1 capsule by mouth once daily (Patient taking differently: Take 40 mg by mouth once daily.)    rosuvastatin (CRESTOR) 20 MG tablet Take 1 tablet (20 mg total) by mouth once daily.    sertraline (ZOLOFT) 25 MG tablet Take 1 tablet (25 mg total) by mouth once daily.    TRELEGY ELLIPTA 100-62.5-25 mcg DsDv Inhale 1 puff by mouth once daily (Patient taking differently: Inhale 1 puff into the lungs as needed.)    vitamin D 1000 units Tab Take 1 tablet (1,000 Units total) by mouth once daily.    [DISCONTINUED] rivaroxaban (XARELTO) 10 mg Tab Take 1 tablet (10 mg total) by mouth daily with dinner or evening meal.    meclizine (ANTIVERT) 25 mg tablet Take 1 tablet (25 mg total) by  mouth 3 (three) times daily as needed.    nitroGLYCERIN (NITROSTAT) 0.4 MG SL tablet Place 1 tablet (0.4 mg total) under the tongue every 5 (five) minutes as needed for Chest pain. As needed (Patient taking differently: Place 0.4 mg under the tongue every 5 (five) minutes as needed for Chest pain. Seek medical help if pain is not relieved after the third dose.)    polyethylene glycol (GLYCOLAX) 17 gram/dose powder Take 17 g by mouth 2 (two) times a day.    tamsulosin (FLOMAX) 0.4 mg Cap Take 1 capsule (0.4 mg total) by mouth once daily.     Family History     Problem Relation (Age of Onset)    Diabetes Sister, Brother    Heart disease Mother    Hypertension Mother    Kidney disease Son    Lung disease Father        Tobacco Use    Smoking status: Former Smoker     Packs/day: 0.35     Years: 44.00     Pack years: 15.40     Types: Cigarettes     Start date: 1970    Smokeless tobacco: Never Used    Tobacco comment: 1/08/2015   Substance and Sexual Activity    Alcohol use: No     Alcohol/week: 0.0 standard drinks    Drug use: No    Sexual activity: Not Currently     Review of Systems   Constitutional: Positive for fatigue. Negative for activity change, appetite change, chills, diaphoresis, fever and unexpected weight change.   HENT: Negative for congestion, ear pain, facial swelling, hearing loss, sore throat and trouble swallowing.    Eyes: Negative for pain and discharge.   Respiratory: Positive for cough and shortness of breath. Negative for chest tightness and wheezing.    Cardiovascular: Negative for chest pain, palpitations and leg swelling.   Gastrointestinal: Negative for abdominal pain, blood in stool, diarrhea, nausea and vomiting.   Endocrine: Negative for polydipsia, polyphagia and polyuria.   Genitourinary: Negative for difficulty urinating, dysuria, flank pain, frequency and urgency.   Musculoskeletal: Negative for arthralgias, back pain, joint swelling, neck pain and neck stiffness.   Skin:  Negative for rash and wound.   Allergic/Immunologic: Negative for environmental allergies and immunocompromised state.   Neurological: Positive for weakness. Negative for dizziness, seizures, syncope, speech difficulty, light-headedness, numbness and headaches.   Hematological: Negative for adenopathy.   Psychiatric/Behavioral: Negative for sleep disturbance and suicidal ideas. The patient is not nervous/anxious.    All other systems reviewed and are negative.    Objective:     Vital Signs (Most Recent):  Temp: 97.7 °F (36.5 °C) (01/13/22 1952)  Pulse: 82 (01/13/22 1952)  Resp: 20 (01/13/22 1952)  BP: (!) 166/77 (01/13/22 1952)  SpO2: 96 % (01/13/22 1952) Vital Signs (24h Range):  Temp:  [97.7 °F (36.5 °C)-98.2 °F (36.8 °C)] 97.7 °F (36.5 °C)  Pulse:  [60-82] 82  Resp:  [16-20] 20  SpO2:  [96 %-100 %] 96 %  BP: (149-166)/(67-87) 166/77     Weight: 54.4 kg (120 lb)  Body mass index is 21.26 kg/m².    Physical Exam  Vitals and nursing note reviewed.   Constitutional:       Appearance: She is ill-appearing.      Comments: Advanced age, chronically ill appearing   HENT:      Head: Normocephalic and atraumatic.      Nose: Nose normal.      Mouth/Throat:      Mouth: Mucous membranes are moist.      Pharynx: Oropharynx is clear.   Eyes:      Extraocular Movements: Extraocular movements intact.      Pupils: Pupils are equal, round, and reactive to light.   Cardiovascular:      Rate and Rhythm: Normal rate and regular rhythm.      Pulses: Normal pulses.      Heart sounds: Murmur heard.       Pulmonary:      Effort: Pulmonary effort is normal.      Breath sounds: Wheezing present.      Comments: Wheezes throughout  Abdominal:      General: Bowel sounds are normal.      Palpations: Abdomen is soft.   Musculoskeletal:         General: Normal range of motion.      Cervical back: Normal range of motion and neck supple.   Skin:     General: Skin is warm and dry.      Capillary Refill: Capillary refill takes less than 2 seconds.    Neurological:      General: No focal deficit present.      Mental Status: She is alert and oriented to person, place, and time.   Psychiatric:         Mood and Affect: Mood normal.         Behavior: Behavior normal.           CRANIAL NERVES     CN III, IV, VI   Pupils are equal, round, and reactive to light.       Significant Labs:   All pertinent labs within the past 24 hours have been reviewed.  CBC:   Recent Labs   Lab 01/13/22  1215   WBC 8.90   HGB 9.6*   HCT 30.3*        CMP:   Recent Labs   Lab 01/13/22  1215      K 3.8      CO2 23   GLU 81   BUN 30   CREATININE 1.7*   CALCIUM 8.7   PROT 5.7*   ALBUMIN 2.4*   BILITOT 0.8   ALKPHOS 74   AST 32   ALT 27   ANIONGAP 11   EGFRNONAA 26.0*     Cardiac Markers:   Recent Labs   Lab 01/13/22  1215   *     Troponin:   Recent Labs   Lab 01/13/22  1215 01/13/22  1331   TROPONINI 0.059* 0.051*       Significant Imaging: I have reviewed all pertinent imaging results/findings within the past 24 hours.  EKG: I have reviewed all pertinent results/findings within the past 24 hours and my personal findings are: NSR, LVH, age indeterminate septal infarc   X-Ray Chest AP Portable    Result Date: 1/13/2022  Portable chest x-ray at 2:15 PM is compared to prior study of 1/11/2022 Clinical history is cough The heart is enlarged. There is stable scarring in the right lung base. There are no new infiltrates or pleural effusions. There is a heart monitor in place. There are surgical clips in the right axilla. IMPRESSION: Cardiomegaly with atelectasis in the right lung base No acute pulmonary process Electronically signed by:  Jonna Lyn MD  1/13/2022 12:46 PM CST Workstation: [id number]    X-Ray Chest AP Portable    Result Date: 1/11/2022  Chest single view CLINICAL DATA: Productive cough FINDINGS: AP view is compared to October 2021. Heart size is normal. The aortic arch is calcified. Loop recorder device is noted. There is mild parenchymal scarring  at the right lung base. No acute infiltrate or effusion is identified. IMPRESSION: 1. Chronic findings as above. No acute radiographic abnormalities are identified. Electronically signed by:  Ariel aBrr MD  1/11/2022 2:43 PM [facility] Workstation: [id number]      Assessment/Plan:     Active Hospital Problems    Diagnosis    *Acute exacerbation of chronic obstructive pulmonary disease (COPD)    GAVE (gastric antral vascular ectasia)    Anemia due to stage 3 chronic kidney disease treated with erythropoietin    Diastolic dysfunction    Anemia of chronic disease    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- hypercoag work up Dr. Alvarez 2018-needs lifelong anticoag    Chronic anticoagulation     Apixaban      Asthma-COPD overlap syndrome    Controlled type 2 diabetes mellitus, without long-term current use of insulin    Smoker, quit 5/2016, 25 pck-years    Hypothyroidism with abnormal thyroid function test     PLAN:  Admit to med/tele  IV steroids x24 hours then bridge to PO   Duonebs  Continue oral doxycyline  Continue home meds  Supplemental O2 per home settings   Home in AM if no contraindications  VTE Risk Mitigation (From admission, onward)         Ordered     IP VTE HIGH RISK PATIENT  Once         01/13/22 1552     Place sequential compression device  Until discontinued         01/13/22 1552                   Jennifer Schneider NP  Department of Hospital Medicine   [facility]

## 2022-01-14 NOTE — HPI
Ms. Cage is a 89 year-old female with history of CKDIII, HFpEF 55% with PASP 50mmHg, COPD, anemia, diabetes mellitus (hemoglobin A1c 5.6 on 06/23/2021), DVT, PE, MTHFR mutation on rivaraxoban, hypothyroidism, COVID19 (9/2021), chronic resp failure on home O2@2L, and gastric antral vascular ectasia who presents today with complaints of SOB. It is moderate. It is associated with wheezing, cough - productive of white sputum, and weakness. She denies fever, chills, N/V/D, chest pain, or orthopnea. She was seen yesterday in the ED and started on doxycycline for acute bronchitis and sent home. She had a difficult night and the SOB worsened so she returned. Procal is negative CXR with atelectasis in the right lower lung base. She was given steroids and duonebs in the ED, but continues to wheeze.

## 2022-01-14 NOTE — PLAN OF CARE
Medicare Outpatient Observation Notice was signed, explained and given to patient/caregiver on 01/14/2022 at 12:15pm     addressed any questions or concerns.    Medicare Outpatient Observation Notice will be scanned into patient's medical record

## 2022-01-14 NOTE — PLAN OF CARE
Pt is a resumption of care with [facility].  USHA sent discharge packet and spoke to Marie with [facility].  Due to Pt being Observation only, no new orders needed at this time.  Agency to see Pt for PRN visit tomorrow, 1/15/2022.     01/14/22 1525   Final Note   Assessment Type Final Discharge Note   Anticipated Discharge Disposition [facility]   Hospital Resources/Appts/Education Provided Post-Acute resouces added to AVS   Post-Acute Status   Post-Acute Authorization Home [facility]   Home Health Status Set-up Complete/Auth obtained   Discharge Delays None known at this time

## 2022-01-14 NOTE — SUBJECTIVE & OBJECTIVE
Past Medical History:   Diagnosis Date    Anemia due to multiple mechanisms 6/29/2018    Anemia, chronic disease 6/29/2018    Anemia, chronic renal failure, stage 3 (moderate) 6/29/2018    Anticoagulant long-term use     aspirin    Aortic aneurysm     Chest pain     CHF (congestive heart failure)     COPD (chronic obstructive pulmonary disease)     COPD with acute exacerbation 1/9/2015    Depression     Diabetes mellitus type II     no longer on any DM meds    DVT (deep venous thrombosis) 06/09/2018    Encounter for blood transfusion     GERD (gastroesophageal reflux disease)     GI bleed     Gout     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 8/7/2018    Hip arthritis 3/1/2016    Homocysteinemia 8/7/2018    Hyperlipidemia     Hypertension     Incontinent of urine     Normocytic normochromic anemia 6/29/2018    Oxygen dependent     use oxygen as needed    PE (pulmonary thromboembolism) 06/09/2018    Pneumonia of right lower lobe due to infectious organism 9/11/2017    Syncope     Thyroid disease     hypothyroid    Type 2 diabetes mellitus with stage 3 chronic kidney disease 1/18/2013    UTI (urinary tract infection)        Past Surgical History:   Procedure Laterality Date    APPENDECTOMY      CHOLECYSTECTOMY      COLONOSCOPY Left 10/29/2020    Procedure: COLONOSCOPY;  Surgeon: Singh Kee III, MD;  Location: [facility];  Service: Endoscopy;  Laterality: Left;    ESOPHAGOGASTRODUODENOSCOPY Left 10/26/2020    Procedure: EGD (ESOPHAGOGASTRODUODENOSCOPY);  Surgeon: Kareem Gramajo MD;  Location: [facility];  Service: Endoscopy;  Laterality: Left;    ESOPHAGOGASTRODUODENOSCOPY Left 10/28/2020    Procedure: EGD (ESOPHAGOGASTRODUODENOSCOPY);  Surgeon: Kareem Gramajo MD;  Location: [facility];  Service: Endoscopy;  Laterality: Left;    ESOPHAGOGASTRODUODENOSCOPY N/A 9/16/2021    Procedure: EGD (ESOPHAGOGASTRODUODENOSCOPY);  Surgeon: Kareem Gramajo MD;  Location: [facility];  Service:  Endoscopy;  Laterality: N/A;    FRACTURE SURGERY      right hip     HERNIA REPAIR      groin    HYSTERECTOMY      INSERTION OF IMPLANTABLE LOOP RECORDER N/A 12/12/2018    Procedure: Insertion, Implantable Loop Recorder;  Surgeon: Ashok Herbert MD;  Location: [facility] CATH LAB;  Service: Cardiology;  Laterality: N/A;    PERCUTANEOUS PINNING OF HIP Left 3/23/2019    Procedure: PINNING, HIP, PERCUTANEOUS;  Surgeon: Jorje Hamlin MD;  Location: [facility] OR;  Service: Orthopedics;  Laterality: Left;    SMALL BOWEL ENTEROSCOPY N/A 10/29/2020    Procedure: ENTEROSCOPY;  Surgeon: Singh Kee III, MD;  Location: [facility] ENDO;  Service: Endoscopy;  Laterality: N/A;    VARICOSE VEIN SURGERY         Review of patient's allergies indicates:   Allergen Reactions    Carvedilol Other (See Comments)     Bradycardia and syncope    Boniva [ibandronate]     Codeine     Hydralazine analogues     Iodinated contrast media Hives    Kionex [sodium polystyrene sulfonate] Diarrhea     From encounter 12/11/17 for diarrhea--not true allergy.    Lisinopril     Morphine        No current facility-administered medications on file prior to encounter.     Current Outpatient Medications on File Prior to Encounter   Medication Sig    albuterol (PROVENTIL) 2.5 mg /3 mL (0.083 %) nebulizer solution USE 1 VIAL IN NEBULIZER EVERY 6 HOURS AS NEEDED FOR WHEEZING. RESCUE (Patient taking differently: Take 2.5 mg by nebulization every 6 (six) hours as needed.)    allopurinoL (ZYLOPRIM) 100 MG tablet Take 100 mg by mouth once daily.    amLODIPine (NORVASC) 5 MG tablet Take 5 mg by mouth 2 (two) times daily.    ascorbic acid, vitamin C, (VITAMIN C) 500 MG tablet Take 500 mg by mouth once daily.    calcium carbonate (OS-TASIA) 500 mg calcium (1,250 mg) tablet Take 1 tablet by mouth 2 (two) times daily.    diclofenac sodium (VOLTAREN) 1 % Gel Apply 2 g topically once daily. (Patient taking differently: Apply 2 g topically as needed.)     diltiaZEM (CARDIZEM CD) 180 MG 24 hr capsule Take 1 capsule (180 mg total) by mouth once daily.    doxycycline (VIBRAMYCIN) 100 MG Cap Take 1 capsule (100 mg total) by mouth 2 (two) times daily. for 10 days    ferrous sulfate (FEOSOL) 325 mg (65 mg iron) Tab tablet Take 325 mg by mouth once daily.    fish oil-omega-3 fatty acids 300-1,000 mg capsule Take 2 g by mouth every evening.    fluticasone (FLONASE) 50 mcg/actuation nasal spray 2 sprays by Each Nare route once daily. (Patient taking differently: 2 sprays by Each Nostril route as needed.)    folic acid-vit B6-vit B12 2.5-25-2 mg (FOLBIC) 2.5-25-2 mg Tab Take 1 tablet by mouth once daily.    furosemide (LASIX) 20 MG tablet Take 1 tablet only as needed, for swelling, increase SOB, weight gain of 3 lbs overnight or 5 lbs for the week (Patient taking differently: Take 20 mg by mouth as needed. Take 1 tablet only as needed, for swelling, increase SOB, weight gain of 3 lbs overnight or 5 lbs for the week)    ketoconazole (NIZORAL) 2 % shampoo Apply topically twice a week. (Patient taking differently: Apply 1 application topically twice a week.)    levothyroxine (SYNTHROID) 88 MCG tablet Take 1 tablet (88 mcg total) by mouth once daily.    losartan (COZAAR) 25 MG tablet Take 1 tablet (25 mg total) by mouth once daily.    magnesium oxide (MAG-OX) 400 mg (241.3 mg magnesium) tablet Take 1 tablet by mouth once daily (Patient taking differently: Take 400 mg by mouth once daily.)    montelukast (SINGULAIR) 10 mg tablet Take 1 tablet (10 mg total) by mouth every evening. (Patient taking differently: Take 10 mg by mouth nightly as needed.)    multivitamin (THERAGRAN) tablet Take 1 tablet by mouth once daily.    omeprazole (PRILOSEC) 40 MG capsule Take 1 capsule by mouth once daily (Patient taking differently: Take 40 mg by mouth once daily.)    rosuvastatin (CRESTOR) 20 MG tablet Take 1 tablet (20 mg total) by mouth once daily.    sertraline (ZOLOFT) 25 MG  tablet Take 1 tablet (25 mg total) by mouth once daily.    TRELEGY ELLIPTA 100-62.5-25 mcg DsDv Inhale 1 puff by mouth once daily (Patient taking differently: Inhale 1 puff into the lungs as needed.)    vitamin D 1000 units Tab Take 1 tablet (1,000 Units total) by mouth once daily.    [DISCONTINUED] rivaroxaban (XARELTO) 10 mg Tab Take 1 tablet (10 mg total) by mouth daily with dinner or evening meal.    meclizine (ANTIVERT) 25 mg tablet Take 1 tablet (25 mg total) by mouth 3 (three) times daily as needed.    nitroGLYCERIN (NITROSTAT) 0.4 MG SL tablet Place 1 tablet (0.4 mg total) under the tongue every 5 (five) minutes as needed for Chest pain. As needed (Patient taking differently: Place 0.4 mg under the tongue every 5 (five) minutes as needed for Chest pain. Seek medical help if pain is not relieved after the third dose.)    polyethylene glycol (GLYCOLAX) 17 gram/dose powder Take 17 g by mouth 2 (two) times a day.    tamsulosin (FLOMAX) 0.4 mg Cap Take 1 capsule (0.4 mg total) by mouth once daily.     Family History     Problem Relation (Age of Onset)    Diabetes Sister, Brother    Heart disease Mother    Hypertension Mother    Kidney disease Son    Lung disease Father        Tobacco Use    Smoking status: Former Smoker     Packs/day: 0.35     Years: 44.00     Pack years: 15.40     Types: Cigarettes     Start date: 1970    Smokeless tobacco: Never Used    Tobacco comment: 1/08/2015   Substance and Sexual Activity    Alcohol use: No     Alcohol/week: 0.0 standard drinks    Drug use: No    Sexual activity: Not Currently     Review of Systems   Constitutional: Positive for fatigue. Negative for activity change, appetite change, chills, diaphoresis, fever and unexpected weight change.   HENT: Negative for congestion, ear pain, facial swelling, hearing loss, sore throat and trouble swallowing.    Eyes: Negative for pain and discharge.   Respiratory: Positive for cough and shortness of breath. Negative for  chest tightness and wheezing.    Cardiovascular: Negative for chest pain, palpitations and leg swelling.   Gastrointestinal: Negative for abdominal pain, blood in stool, diarrhea, nausea and vomiting.   Endocrine: Negative for polydipsia, polyphagia and polyuria.   Genitourinary: Negative for difficulty urinating, dysuria, flank pain, frequency and urgency.   Musculoskeletal: Negative for arthralgias, back pain, joint swelling, neck pain and neck stiffness.   Skin: Negative for rash and wound.   Allergic/Immunologic: Negative for environmental allergies and immunocompromised state.   Neurological: Positive for weakness. Negative for dizziness, seizures, syncope, speech difficulty, light-headedness, numbness and headaches.   Hematological: Negative for adenopathy.   Psychiatric/Behavioral: Negative for sleep disturbance and suicidal ideas. The patient is not nervous/anxious.    All other systems reviewed and are negative.    Objective:     Vital Signs (Most Recent):  Temp: 97.7 °F (36.5 °C) (01/13/22 1952)  Pulse: 82 (01/13/22 1952)  Resp: 20 (01/13/22 1952)  BP: (!) 166/77 (01/13/22 1952)  SpO2: 96 % (01/13/22 1952) Vital Signs (24h Range):  Temp:  [97.7 °F (36.5 °C)-98.2 °F (36.8 °C)] 97.7 °F (36.5 °C)  Pulse:  [60-82] 82  Resp:  [16-20] 20  SpO2:  [96 %-100 %] 96 %  BP: (149-166)/(67-87) 166/77     Weight: 54.4 kg (120 lb)  Body mass index is 21.26 kg/m².    Physical Exam  Vitals and nursing note reviewed.   Constitutional:       Appearance: She is ill-appearing.      Comments: Advanced age, chronically ill appearing   HENT:      Head: Normocephalic and atraumatic.      Nose: Nose normal.      Mouth/Throat:      Mouth: Mucous membranes are moist.      Pharynx: Oropharynx is clear.   Eyes:      Extraocular Movements: Extraocular movements intact.      Pupils: Pupils are equal, round, and reactive to light.   Cardiovascular:      Rate and Rhythm: Normal rate and regular rhythm.      Pulses: Normal pulses.      Heart  sounds: Murmur heard.       Pulmonary:      Effort: Pulmonary effort is normal.      Breath sounds: Wheezing present.      Comments: Wheezes throughout  Abdominal:      General: Bowel sounds are normal.      Palpations: Abdomen is soft.   Musculoskeletal:         General: Normal range of motion.      Cervical back: Normal range of motion and neck supple.   Skin:     General: Skin is warm and dry.      Capillary Refill: Capillary refill takes less than 2 seconds.   Neurological:      General: No focal deficit present.      Mental Status: She is alert and oriented to person, place, and time.   Psychiatric:         Mood and Affect: Mood normal.         Behavior: Behavior normal.           CRANIAL NERVES     CN III, IV, VI   Pupils are equal, round, and reactive to light.       Significant Labs:   All pertinent labs within the past 24 hours have been reviewed.  CBC:   Recent Labs   Lab 01/13/22  1215   WBC 8.90   HGB 9.6*   HCT 30.3*        CMP:   Recent Labs   Lab 01/13/22  1215      K 3.8      CO2 23   GLU 81   BUN 30   CREATININE 1.7*   CALCIUM 8.7   PROT 5.7*   ALBUMIN 2.4*   BILITOT 0.8   ALKPHOS 74   AST 32   ALT 27   ANIONGAP 11   EGFRNONAA 26.0*     Cardiac Markers:   Recent Labs   Lab 01/13/22  1215   *     Troponin:   Recent Labs   Lab 01/13/22  1215 01/13/22  1331   TROPONINI 0.059* 0.051*       Significant Imaging: I have reviewed all pertinent imaging results/findings within the past 24 hours.  EKG: I have reviewed all pertinent results/findings within the past 24 hours and my personal findings are: NSR, LVH, age indeterminate septal infarc   X-Ray Chest AP Portable    Result Date: 1/13/2022  Portable chest x-ray at 2:15 PM is compared to prior study of 1/11/2022 Clinical history is cough The heart is enlarged. There is stable scarring in the right lung base. There are no new infiltrates or pleural effusions. There is a heart monitor in place. There are surgical clips in the right  axilla. IMPRESSION: Cardiomegaly with atelectasis in the right lung base No acute pulmonary process Electronically signed by:  Jonna Lyn MD  1/13/2022 12:46 PM CST Workstation: [id number]    X-Ray Chest AP Portable    Result Date: 1/11/2022  Chest single view CLINICAL DATA: Productive cough FINDINGS: AP view is compared to October 2021. Heart size is normal. The aortic arch is calcified. Loop recorder device is noted. There is mild parenchymal scarring at the right lung base. No acute infiltrate or effusion is identified. IMPRESSION: 1. Chronic findings as above. No acute radiographic abnormalities are identified. Electronically signed by:  Ariel Barr MD  1/11/2022 2:43 PM CST Workstation: [id number]

## 2022-01-15 NOTE — DISCHARGE SUMMARY
[facility] Medicine  Discharge Summary      Patient Name: Blaire Cage  MRN: [id number]  Patient Class: OP- Observation  Admission Date: 1/13/2022  Hospital Length of Stay: 0 days  Discharge Date and Time: 1/14/2022  1:39 PM  Attending Physician: No att. providers found   Discharging Provider: Joey Ramsey MD  Primary Care Provider: Eli Edmonds MD      HPI:   Ms. Cage is a 89 year-old female with history of CKDIII, HFpEF 55% with PASP 50mmHg, COPD, anemia, diabetes mellitus (hemoglobin A1c 5.6 on 06/23/2021), DVT, PE, MTHFR mutation on rivaraxoban, hypothyroidism, COVID19 (9/2021), chronic resp failure on home O2@2L, and gastric antral vascular ectasia who presents today with complaints of SOB. It is moderate. It is associated with wheezing, cough - productive of white sputum, and weakness. She denies fever, chills, N/V/D, chest pain, or orthopnea. She was seen yesterday in the ED and started on doxycycline for acute bronchitis and sent home. She had a difficult night and the SOB worsened so she returned. Procal is negative CXR with atelectasis in the right lower lung base. She was given steroids and duonebs in the ED, but continues to wheeze.      * No surgery found *      Hospital Course:   Patient admitted with acute bronchitis. She was continued on antibiotics which had been started 2 days prior for her URI symptoms and IV steroids started. She tells me she was coughing up yellow sputum. CXR with basilar atelectasis.  She is already on home O2.  She feels much better on day of discharge with resolution of wheezing. Cough still persists but is overall better. She feels well and would like to go home which I think is appropriate. I have provided Rx for 5 more days of steroid and she will complete the previous course of Abx.  Examined on day of discharge and sitting up in chair, alert, NAD, comfortable respirations on her 2L NC.  Lungs with no wheezing and good air entry. I left  a voicemail for her daughter discussing plan.  Return precautions given.       Goals of Care Treatment Preferences:  Code Status: Full Code      Consults:     No new Assessment & Plan notes have been filed under this hospital service since the last note was generated.  Service: Hospital Medicine    Final Active Diagnoses:    Diagnosis Date Noted POA    GAVE (gastric antral vascular ectasia) [K31.819] 09/16/2021 Yes    Anemia due to stage 3 chronic kidney disease treated with erythropoietin [N18.30, D63.1] 06/11/2021 Yes    Diastolic dysfunction [I51.89] 08/18/2020 Yes    Anemia of chronic disease [D63.8] 10/03/2018 Yes    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- hypercoag work up Dr. Alvarez 2018-needs lifelong anticoag [Z15.89] 08/07/2018 Not Applicable    Chronic anticoagulation [Z79.01] 06/28/2018 Not Applicable    Asthma-COPD overlap syndrome [J44.9] 06/09/2018 Yes    Controlled type 2 diabetes mellitus, without long-term current use of insulin [E11.9] 06/09/2018 Yes    Smoker, quit 5/2016, 25 pck-years [F17.200] 06/15/2016 Yes    Hypothyroidism with abnormal thyroid function test [E03.9] 01/18/2013 Yes     Chronic      Problems Resolved During this Admission:    Diagnosis Date Noted Date Resolved POA    PRINCIPAL PROBLEM:  Acute exacerbation of chronic obstructive pulmonary disease (COPD) [J44.1] 01/08/2015 01/14/2022 Yes       Discharged Condition: good    Disposition: Home-Health Care [facility]    Follow Up:   Contact information for follow-up providers     Eli Edmonds MD In 2 weeks.    Specialty: Family Medicine  Why: post hospital dicharge follow up  Contact information:  [address] [facility] [address] [phone]                   Contact information for after-discharge care     Home Medical Care     [facility] .    Service: Home Health Services  Contact information:  [address]  [address]  [facility] [address] [phone]                            Patient Instructions:      Diet Cardiac     Notify your health care provider if you experience any of the following:  difficulty breathing or increased cough     Notify your health care provider if you experience any of the following:  increased confusion or weakness     Activity as tolerated       Significant Diagnostic Studies: Labs:   CMP   Recent Labs   Lab 01/13/22  1215 01/14/22  0554    137   K 3.8 3.7    102   CO2 23 22*   GLU 81 178*   BUN 30 45*   CREATININE 1.7* 2.1*   CALCIUM 8.7 8.5*   PROT 5.7*  --    ALBUMIN 2.4*  --    BILITOT 0.8  --    ALKPHOS 74  --    AST 32  --    ALT 27  --    ANIONGAP 11 13   ESTGFRAFRICA 30.0* 23.2*   EGFRNONAA 26.0* 20.1*    and CBC   Recent Labs   Lab 01/13/22  1215 01/13/22  1215 01/14/22  0554   WBC 8.90  --  5.20   HGB 9.6*  --  9.5*   HCT 30.3*   < > 29.9*     --  224    < > = values in this interval not displayed.       Pending Diagnostic Studies:     None         Medications:  Reconciled Home Medications:      Medication List      START taking these medications    predniSONE 20 MG tablet  Commonly known as: DELTASONE  Take 1 tablet (20 mg total) by mouth once daily. for 5 days        CHANGE how you take these medications    albuterol 2.5 mg /3 mL (0.083 %) nebulizer solution  Commonly known as: PROVENTIL  USE 1 VIAL IN NEBULIZER EVERY 6 HOURS AS NEEDED FOR WHEEZING. RESCUE  What changed: See the new instructions.     diclofenac sodium 1 % Gel  Commonly known as: VOLTAREN  Apply 2 g topically once daily.  What changed:   · when to take this  · reasons to take this     fluticasone propionate 50 mcg/actuation nasal spray  Commonly known as: FLONASE  2 sprays by Each Nare route once daily.  What changed:   · when to take this  · reasons to take this     furosemide 20 MG tablet  Commonly known as: LASIX  Take 1 tablet only as needed, for swelling, increase SOB, weight gain of 3 lbs overnight or 5 lbs for the week  What changed:   · how much to  take  · how to take this  · when to take this  · reasons to take this     ketoconazole 2 % shampoo  Commonly known as: NIZORAL  Apply topically twice a week.  What changed: how much to take     montelukast 10 mg tablet  Commonly known as: SINGULAIR  Take 1 tablet (10 mg total) by mouth every evening.  What changed:   · when to take this  · reasons to take this     nitroGLYCERIN 0.4 MG SL tablet  Commonly known as: NITROSTAT  Place 1 tablet (0.4 mg total) under the tongue every 5 (five) minutes as needed for Chest pain. As needed  What changed: additional instructions     TRELEGY ELLIPTA 100-62.5-25 mcg Dsdv  Generic drug: fluticasone-umeclidin-vilanter  Inhale 1 puff by mouth once daily  What changed:   · how to take this  · when to take this  · reasons to take this        CONTINUE taking these medications    allopurinoL 100 MG tablet  Commonly known as: ZYLOPRIM  Take 100 mg by mouth once daily.     amLODIPine 5 MG tablet  Commonly known as: NORVASC  Take 5 mg by mouth 2 (two) times daily.     ascorbic acid (vitamin C) 500 MG tablet  Commonly known as: VITAMIN C  Take 500 mg by mouth once daily.     calcium carbonate 500 mg calcium (1,250 mg) tablet  Commonly known as: OS-TASIA  Take 1 tablet by mouth 2 (two) times daily.     diltiaZEM 180 MG 24 hr capsule  Commonly known as: CARDIZEM CD  Take 1 capsule (180 mg total) by mouth once daily.     doxycycline 100 MG Cap  Commonly known as: VIBRAMYCIN  Take 1 capsule (100 mg total) by mouth 2 (two) times daily. for 10 days     ferrous sulfate 325 mg (65 mg iron) Tab tablet  Commonly known as: FEOSOL  Take 325 mg by mouth once daily.     fish oil-omega-3 fatty acids 300-1,000 mg capsule  Take 2 g by mouth every evening.     FOLBIC 2.5-25-2 mg Tab  Generic drug: folic acid-vit B6-vit B12 2.5-25-2 mg  Take 1 tablet by mouth once daily.     levothyroxine 88 MCG tablet  Commonly known as: SYNTHROID  Take 1 tablet (88 mcg total) by mouth once daily.     losartan 25 MG  tablet  Commonly known as: COZAAR  Take 1 tablet (25 mg total) by mouth once daily.     magnesium oxide 400 mg (241.3 mg magnesium) tablet  Commonly known as: MAG-OX  Take 1 tablet by mouth once daily     meclizine 25 mg tablet  Commonly known as: ANTIVERT  Take 1 tablet (25 mg total) by mouth 3 (three) times daily as needed.     multivitamin tablet  Commonly known as: THERAGRAN  Take 1 tablet by mouth once daily.     omeprazole 40 MG capsule  Commonly known as: PRILOSEC  Take 1 capsule by mouth once daily     polyethylene glycol 17 gram/dose powder  Commonly known as: GLYCOLAX  Take 17 g by mouth 2 (two) times a day.     rosuvastatin 20 MG tablet  Commonly known as: CRESTOR  Take 1 tablet (20 mg total) by mouth once daily.     sertraline 25 MG tablet  Commonly known as: ZOLOFT  Take 1 tablet (25 mg total) by mouth once daily.     tamsulosin 0.4 mg Cap  Commonly known as: FLOMAX  Take 1 capsule (0.4 mg total) by mouth once daily.     vitamin D 1000 units Tab  Commonly known as: VITAMIN D3  Take 1 tablet (1,000 Units total) by mouth once daily.            Indwelling Lines/Drains at time of discharge:   Lines/Drains/Airways     None                 Time spent on the discharge of patient: 32 minutes         Joey Ramsey MD  Department of Hospital Medicine  [facility]

## 2022-01-15 NOTE — HOSPITAL COURSE
Patient admitted with acute bronchitis. She was continued on antibiotics which had been started 2 days prior for her URI symptoms and IV steroids started. She tells me she was coughing up yellow sputum. CXR with basilar atelectasis.  She is already on home O2.  She feels much better on day of discharge with resolution of wheezing. Cough still persists but is overall better. She feels well and would like to go home which I think is appropriate. I have provided Rx for 5 more days of steroid and she will complete the previous course of Abx.  Examined on day of discharge and sitting up in chair, alert, NAD, comfortable respirations on her 2L NC.  Lungs with no wheezing and good air entry. I left a voicemail for her daughter discussing plan.  Return precautions given.

## 2022-01-28 NOTE — PROGRESS NOTES
Subjective:       Patient ID: Blaire Cage is a 89 y.o. female.    Chief Complaint: Follow-up (F/U Staph Infection)    HPI  Review of Systems   Constitutional: Negative for fatigue and unexpected weight change.   Respiratory: Negative for chest tightness and shortness of breath.    Cardiovascular: Negative for chest pain, palpitations and leg swelling.   Gastrointestinal: Negative for abdominal pain.   Musculoskeletal: Negative for arthralgias.   Neurological: Negative for dizziness, syncope, light-headedness and headaches.       Patient Active Problem List   Diagnosis    Diabetic neuropathy, type II diabetes mellitus    CKD (chronic kidney disease), stage III, eGFR 39, progressive 31    Hypothyroidism with abnormal thyroid function test    Hypertension associated with diabetes    Hyperlipidemia associated with type 2 diabetes mellitus    Type 2 diabetes mellitus with stage 4 chronic kidney disease    Right carotid bruit    Anxiety    Abdominal aortic aneurysm without rupture    Hip arthritis    Degenerative spinal arthritis    Osteoporosis    Left ventricular diastolic dysfunction with preserved systolic function    Hypertensive left ventricular hypertrophy, without heart failure    Smoker, quit 5/2016, 25 pck-years    Abdominal obesity    Syncopal episodes, last 4/2020    Body mass index (BMI) 23.0-23.9, adult, today 24.1    Iron deficiency anemia due to chronic blood loss    Proteinuria due to type 2 diabetes mellitus    History of syncope in childhood    Prerenal azotemia    Drug-induced constipation    Asthmatic bronchitis with acute exacerbation    Controlled type 2 diabetes mellitus, without long-term current use of insulin    Asthma-COPD overlap syndrome    Eosinophilic asthma    Moderate malnutrition    Type 2 diabetes mellitus with kidney complication, without long-term current use of insulin    Chronic anticoagulation    Constipation    DVT (deep venous thrombosis)     History of pulmonary embolism    Anemia due to multiple mechanisms    Chronic anemia    Normocytic normochromic anemia    Anemia, chronic renal failure, stage 3 (moderate)    Homocysteinemia    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- hypercoag work up Dr. Alvarez 2018-needs lifelong anticoag    Diastolic CHF, chronic    Anemia of chronic disease    Kidney stones    Macrocytic anemia    Orthostatic hypotension    Closed left hip fracture    Hip pain, left    Chronic respiratory failure with hypoxia    Nonrheumatic aortic valve stenosis    Mitral stenosis    Hypoalbuminemia    Hypoglycemia    CKD (chronic kidney disease), stage IV    Vertigo    Left foot pain    Posterior tibial tendon dysfunction, left    Arthritis    Pulmonary hypertension    Diastolic dysfunction    Status post placement of implantable loop recorder    Iron deficiency anemia, unspecified    Anemia due to stage 3 chronic kidney disease treated with erythropoietin    Fall at home    Personal history of thromboembolic disease    GAVE (gastric antral vascular ectasia)    Cellulitis of right lower extremity     Patient is here for a chronic conditions follow up.    Reviewed labs 1/2022    134/60 -home BP reading.  Here with daughter . No home health right now.  Feeling much better. Swelling in legs down taking fluid pill qod.  No sob. Uses oxugen NC supplemental at night.  Lives with Ramonita but both her daughters share her care and supervise meds.  Cough has signifcantly improved since being home. No fever.      Admitted [facility] 1/13/2022 COPD exacerbation with acute on chronic resp failure.  Responded to steroids and abx, 2l NC O2.  Discharged on supplemental oxygen, Doxycycline 100mg bid x 10 days and prednisone. Had coag neg staph in 1 blood culture 1/13/2022 -likely contaminant    Admitted [facility] 10/21/2021 for acute on chronic CHF    History:  Admitted 9/24/2021 for possible melena with h/o GI bleed from gastric antral  vascular ectasia on chronic anticoag xarelto due to hypercoag. Under care of GI Dr. Gramajo. Found to be COVID + (WAS FULLY VACCINATED).  Stool occult blood was negative. Dark stools presumed related to iron supplements. Was given remdesivir and oxygen supplementation continued (as at home).  No unstable H/H           Admitted 7/9/2021 [facility] for right hip fracture after fall at home        Ortho Dr. Escoto right hip fracture 7/2021 periprosthetic     Urology NP Dr. Haile/ [place] recurrent uti, urinary retention requriing carter placement 7/2021. flomax added        Admitted [facility] 10/29/20 UGI and had bleeding  angioectasias in stomach and duodenum     Admitted [facility] 5/7/20 for dizziness x 1 month. Neuro consulted and no neuro or central cause of dizziness found. Recommended f/u ENT.  Radiology:   MRI brain 05/08/2020:  1. No evidence of an acute intracranial abnormality.  2. Moderate generalized cerebral volume loss and chronic ischemic changes, as above.  MRA brain 05/08/2020: No high-grade stenosis, large vessel occlusion, or aneurysm.  Carotid ultrasound 05/08/2020: No evidence of a hemodynamically significant carotid bifurcation stenosis.  There is moderate calcified atherosclerotic plaque seen in both carotid bifurcations.     Card Dr. Herbert/now [facility] [facility], aortic stenosis, chf     Heme/Onc Dr. Alvarez iron def anemia, h/o DVT and PE with MTHFR-C heterozygous + on eliquis     Pulm Dr. Christopher/Facundo- asthma -COPD     GI Dr. Flores-      Nephro Dr. Jo -CKD stage 3 b, anemia of chronic diz, hyperuricemia     Objective:      Physical Exam  Vitals and nursing note reviewed.   Constitutional:       Appearance: She is well-developed and well-nourished.   Cardiovascular:      Rate and Rhythm: Normal rate and regular rhythm.      Heart sounds: Normal heart sounds.   Pulmonary:      Effort: Pulmonary effort is normal.      Breath sounds: Normal breath sounds.   Musculoskeletal:         General: No edema.   Skin:      General: Skin is warm and dry.   Neurological:      Mental Status: She is alert and oriented to person, place, and time.   Psychiatric:         Mood and Affect: Mood and affect normal.         Assessment:       1. Hypothyroidism, unspecified type    2. Stage 3 chronic kidney disease, unspecified whether stage 3a or 3b CKD    3. Hypertension associated with diabetes    4. Hyperlipidemia associated with type 2 diabetes mellitus    5. Type 2 diabetes mellitus with stage 4 chronic kidney disease, without long-term current use of insulin    6. Iron deficiency anemia due to chronic blood loss    7. Acute on chronic congestive heart failure, unspecified heart failure type        Plan:         1. Hypothyroidism, unspecified type  Stable condition.  Continue current medications.  Will adjust based on lab findings or if condition changes.    - TSH; Future  - T4, Free; Future    2. Stage 3 chronic kidney disease, unspecified whether stage 3a or 3b CKD  Stable and chronic.  Will continue to monitor q3-6 months and control chronic conditions as optimally as possible to preserve function.  Cont nephro consult and monitoring    3. Hypertension associated with diabetes  Controlled on current medications.  Continue current medications.      4. Hyperlipidemia associated with type 2 diabetes mellitus  Stable condition.  Continue current medications.  Will adjust based on lab findings or if condition changes.      5. Type 2 diabetes mellitus with stage 4 chronic kidney disease, without long-term current use of insulin  Stable condition.  Continue current medications.  Will adjust based on lab findings or if condition changes.    - Hemoglobin A1C; Future    6. Iron deficiency anemia due to chronic blood loss  Cont heme/onc monitoring and care    7. Acute on chronic congestive heart failure, unspecified heart failure type  Resolved. Cont card monitoring. Weigh and monitor swelling daily. Notify me and adjust fluids meds as discussed  based on weight changes        Time spent with patient: 20 minutes    Patient with be reevaluated in 3 months or sooner prn    Greater than 50% of this visit was spent counseling as described in above documentation:Yes

## 2022-02-02 NOTE — PATIENT INSTRUCTIONS
Continue trelegy one puff daily- increase to higher dose for asthma. Rinse mouth after use  Albuterol nebulizer twice daily  mucinex if getting congested again  CT chest   Need Lasix- legs swelling up

## 2022-02-02 NOTE — PROGRESS NOTES
2/2/2022    Blaire Cage     Follow-up    Chief Complaint   Patient presents with    Follow-up    Shortness of Breath       HPI:   02/02/2022- pt coming for follow up visit. She was observed in the hospital 1/13/22 for acute bronchitis. She took a course of steroids and doxycycline. She feels better now but started again last night w/ dry cough per daughter. She c/o constipation.  Home health nurses come every 2 wks. When exacerbation started she was just taking plain mucinex then seemed better, then came back worse.  She did have covid in 9/2021.   Uses trelegy daily  Uses albuterol nebs BID    02/09/2021- no changes in her breathing or exacerbations.  Her daughter visits daily to check on her. Has covid vaccine scheduled Fri.  trelegy inhaler daily  Does nebs BID  Wears O2 at night    08/05/2020-   Keep doing trelegy and nebulizer medicines  Follow up with dr. Herbert  Continue activity as you can  Get vaccines- flu shot in fall  patient is a 89-year-old female with COPD, history of DVT/PE on Eliquis  Inhalers: trelegy, duo neb- uses 3x/day. Overall doing well without major complaints. Only WEST w/ activity but denies cough or phlegm.  Reports tongue is numb she thinks from trelegy. She does some activity in the house w/ ADLs but slows down and takes breaks when SOB. Mops the floor of one room then takes a break before starting next. When wakes up in the am she has some sinus congestion and clears throat. No exacerbations. Due to f/u with Dr. Herbert this week. Reports was getting some testing for carotid artery stenosis. Daughter is with her-  in [place]- and assists w/ pt. Pt lives with her.    1/21/2020-trelegy used but expensive, used prednisone once for flu- cleared nicely with 3 days.  No falls - uses roller walker.  Lives alone with visits from daughters daily. Sleeps with oxygen -     July 18,2019-fell with hip fx - had rehab then fell again 2 wks ago.  Uses prednisone once or twice?  No advair  as not covered.  Cannot recall singulair  Patient Instructions   Use budesonide in nebulizer if covered. Use twice daily (once better than none- will prevent but not improve).  May need prednisone to keep lungs stable, should be better with regular use budesonide.     1/8/2019- no prednisone recently, has cough with mucous yellow to clear now- lungs worsened 2 wks ago and saw NP Anatoly and got celestone shot.    Discussed with patient above for education the following:    Get fluticasone - salmeterol inhaler and use 1 twice daily - if covered.  May prevent lungs from getting sick.  Use prednisone daily for 3 days if bronchitis flares.  If yellow mucous may need antibiotic.   July 2, 2018- had pe /dvt dx early June by v/q and u/s.  Took prednisone just once.    Discussed with patient above for education the following:    Use prednisone if needed. Lungs are doing well,   You had had foot swelling but no symptoms for 2 weeks- blood clot in leg went to lung.    Stay on blood thinners, usually 6 months minimum course, may be wise to stay on therapy?    Dec 20, 2017Smoked late life, no h/o asthma.  Lives alone with daughters /grandkids in and out.   No 02- off smokes a yr.  Has sinus problems chr with good flonase response.  Mucous is clear with no c/o excess cough and no wheezes.  Breathing felt to be good usually.  Uses resp rx bid.  No hosp for lungs- h/o  Fainting.  Has had very positive result from prednisone.    The chief compliant  problem is stable   PFSH:  Past Medical History:   Diagnosis Date    Anemia due to multiple mechanisms 6/29/2018    Anemia, chronic disease 6/29/2018    Anemia, chronic renal failure, stage 3 (moderate) 6/29/2018    Anticoagulant long-term use     aspirin    Aortic aneurysm     Chest pain     CHF (congestive heart failure)     COPD (chronic obstructive pulmonary disease)     COPD with acute exacerbation 1/9/2015    Depression     Diabetes mellitus type II     no longer on  any DM meds    DVT (deep venous thrombosis) 06/09/2018    Encounter for blood transfusion     GERD (gastroesophageal reflux disease)     GI bleed     Gout     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 8/7/2018    Hip arthritis 3/1/2016    Homocysteinemia 8/7/2018    Hyperlipidemia     Hypertension     Incontinent of urine     Normocytic normochromic anemia 6/29/2018    Oxygen dependent     use oxygen as needed    PE (pulmonary thromboembolism) 06/09/2018    Pneumonia of right lower lobe due to infectious organism 9/11/2017    Syncope     Thyroid disease     hypothyroid    Type 2 diabetes mellitus with stage 3 chronic kidney disease 1/18/2013    UTI (urinary tract infection)          Past Surgical History:   Procedure Laterality Date    APPENDECTOMY      CHOLECYSTECTOMY      COLONOSCOPY Left 10/29/2020    Procedure: COLONOSCOPY;  Surgeon: Singh Kee III, MD;  Location: [facility];  Service: Endoscopy;  Laterality: Left;    ESOPHAGOGASTRODUODENOSCOPY Left 10/26/2020    Procedure: EGD (ESOPHAGOGASTRODUODENOSCOPY);  Surgeon: Kareem Gramajo MD;  Location: [facility];  Service: Endoscopy;  Laterality: Left;    ESOPHAGOGASTRODUODENOSCOPY Left 10/28/2020    Procedure: EGD (ESOPHAGOGASTRODUODENOSCOPY);  Surgeon: Kareem Gramajo MD;  Location: [facility];  Service: Endoscopy;  Laterality: Left;    ESOPHAGOGASTRODUODENOSCOPY N/A 9/16/2021    Procedure: EGD (ESOPHAGOGASTRODUODENOSCOPY);  Surgeon: Kareem Gramajo MD;  Location: [facility];  Service: Endoscopy;  Laterality: N/A;    FRACTURE SURGERY      right hip     HERNIA REPAIR      groin    HYSTERECTOMY      INSERTION OF IMPLANTABLE LOOP RECORDER N/A 12/12/2018    Procedure: Insertion, Implantable Loop Recorder;  Surgeon: Ashok Herbert MD;  Location: [facility] CATH LAB;  Service: Cardiology;  Laterality: N/A;    PERCUTANEOUS PINNING OF HIP Left 3/23/2019    Procedure: PINNING, HIP, PERCUTANEOUS;  Surgeon: Jorje Hamlin MD;  Location: [facility] OR;   "Service: Orthopedics;  Laterality: Left;    SMALL BOWEL ENTEROSCOPY N/A 10/29/2020    Procedure: ENTEROSCOPY;  Surgeon: Singh Kee III, MD;  Location: [facility];  Service: Endoscopy;  Laterality: N/A;    VARICOSE VEIN SURGERY       Social History     Tobacco Use    Smoking status: Former Smoker     Packs/day: 0.35     Years: 44.00     Pack years: 15.40     Types: Cigarettes     Start date: 1970    Smokeless tobacco: Never Used    Tobacco comment: 1/08/2015   Substance Use Topics    Alcohol use: No     Alcohol/week: 0.0 standard drinks    Drug use: No     Family History   Problem Relation Age of Onset    Hypertension Mother     Heart disease Mother     Lung disease Father     Diabetes Sister     Diabetes Brother     Kidney disease Son      Review of patient's allergies indicates:   Allergen Reactions    Carvedilol Other (See Comments)     Bradycardia and syncope    Boniva [ibandronate]     Codeine     Hydralazine analogues     Iodinated contrast- oral and iv dye Hives    Kionex [sodium polystyrene sulfonate]     Lisinopril     Morphine        Performance Status:The patient's activity level is functions out of house.      Review of Systems:  a review of eleven systems covering constitutional, Eye, HEENT, Psych, Respiratory, Cardiac, GI, , Musculoskeletal, Endocrine, Dermatologic was negative except for pertinent findings as listed ABOVE and below: wt gain- 10# in 1 week  Leg swelling    Exam:Comprehensive exam done. /79 (BP Location: Left arm, Patient Position: Sitting, BP Method: Large (Automatic))   Pulse 71   Resp 16   Ht 5' 3" (1.6 m)   Wt 59.6 kg (131 lb 6.3 oz)   LMP  (LMP Unknown)   SpO2 98%   BMI 23.28 kg/m²   Exam included Vitals as listed, and patient's appearance and affect and alertness and mood, oral exam for yeast and hygiene and pharynx lesions and Mallapatti (M) score, neck with inspection for jvd and masses and thyroid abnormalities and lymph nodes " (supraclavicular and infraclavicular nodes and axillary also examined and noted if abn), chest exam included symmetry and effort and fremitus and percussion and auscultation, cardiac exam included rhythm and gallops and murmur and rubs and jvd and edema, abdominal exam for mass and hepatosplenomegaly and tenderness and hernias and bowel sounds, Musculoskeletal exam with muscle tone and posture and mobility/gait and  strength, and skin for rashes and cyanosis and pallor and turgor, extremity for clubbing.  Findings were normal except for pertinent findings listed below:  M1, thin  Diminished breath sounds w/ scattered wheezes  No clubbing or edema    Radiographs (ct chest and cxr) reviewed: results reviewed  -   CXR 1/13/22- R lower lung atelectasis, new  CXR 10/25/20- hyperinflation, slight R basilar infiltrate, bibasilar pleural irregularity  CXR 5/5/20- chronic scarring of bibasilar pleura, tortuosity of trachea, lungs clear    Labs reviewed-      Results for SABRINA BARRIENTOS (MRN [id number]) as of 8/5/2020 15:06   Ref. Range 6/11/2020 12:30   WBC Latest Ref Range: 3.90 - 12.70 K/uL 6.59   RBC Latest Ref Range: 4.00 - 5.40 M/uL 2.91 (L)   Hemoglobin Latest Ref Range: 12.0 - 16.0 g/dL 9.1 (L)   Hematocrit Latest Ref Range: 37.0 - 48.5 % 29.9 (L)   MCV Latest Ref Range: 82 - 98 fL 103 (H)   MCH Latest Ref Range: 27.0 - 31.0 pg 31.3 (H)   MCHC Latest Ref Range: 32.0 - 36.0 g/dL 30.4 (L)   RDW Latest Ref Range: 11.5 - 14.5 % 13.9   Platelets Latest Ref Range: 150 - 350 K/uL 165   Results for SABRINA BARRIENTOS (MRN [id number]) as of 2/9/2021 15:24   Ref. Range 11/9/2020 16:05 11/16/2020 12:53 11/16/2020 12:53   Eos # Latest Ref Range: 0.0 - 0.5 K/uL 1.7 (H) 0.9 (H) 0.9 (H)   Results for SABRINA BARRIENTOS (MRN [id number]) as of 2/9/2021 15:24   Ref. Range 1/27/2021 14:07 1/27/2021 14:08   Eos # Latest Ref Range: 0.0 - 0.5 K/uL 0.2 0.2       PFT results reviewed  10/30/2017- obstruction with positive bd response. Airflow  improves but does not normalize      6MWT 10/2019- desaturation to 88% improves on 3 L O2    Plan:  Clinical impression is apparently straight forward and impression with management as below.    Blaire was seen today for follow-up and shortness of breath.    Diagnoses and all orders for this visit:    Asthma-COPD overlap syndrome  -     Discontinue: fluticasone-umeclidin-vilanter (TRELEGY ELLIPTA) 100-62.5-25 mcg DsDv; Take 1 puff by mouth once daily.  -     albuterol (PROVENTIL) 2.5 mg /3 mL (0.083 %) nebulizer solution; USE 1 VIAL IN NEBULIZER EVERY 6 HOURS AS NEEDED FOR WHEEZING. RESCUE  -     Cancel: X-Ray Chest PA And Lateral; Future    Eosinophilic asthma    Chronic respiratory failure with hypoxia    COPD mixed type        Follow up in about 6 months (around 8/2/2022).    Discussed with patient above for education the following:      Patient Instructions   Continue trelegy one puff daily  Albuterol nebulizer twice daily  mucinex if getting congested again  Repeat chest x-ray make sure right lung clears up

## 2022-02-08 NOTE — PLAN OF CARE
Problem: Fall Injury Risk  Goal: Absence of Fall and Fall-Related Injury  Outcome: Ongoing, Progressing  Intervention: Identify and Manage Contributors  Flowsheets (Taken 2/8/2022 1032)  Self-Care Promotion: independence encouraged  Medication Review/Management: medications reviewed  Intervention: Promote Injury-Free Environment  Flowsheets (Taken 2/8/2022 1032)  Safety Promotion/Fall Prevention: medications reviewed

## 2022-02-14 NOTE — PROCEDURES
Large Joint Aspiration/Injection    Date/Time: 2/14/2022 2:00 PM  Performed by: Joe Reyez MD  Authorized by: Joe Reyez MD     Consent Done?:  Yes (Verbal)  Indications:  Pain and joint swelling  Timeout: prior to procedure the correct patient, procedure, and site was verified    Prep: patient was prepped and draped in usual sterile fashion      Local anesthesia used?: Yes    Anesthesia:  Local infiltration  Anesthetic total (ml):  1      Details:  Needle Size:  25 G and 18 G  Ultrasonic Guidance for needle placement?: Yes    Images are saved and documented.  Approach:  Posterior  Location:  Knee  Medications:  20 mg triamcinolone acetonide 40 mg/mL  Aspirate amount (mL):  5  Aspirate:  Clear and yellow

## 2022-02-14 NOTE — PROGRESS NOTES
Today's Date: 02/14/2022     Referring Provider: No ref. provider found     Chief Complaint:   Chief Complaint   Patient presents with    Knee Pain     R knee baker's cyst       HPI: Blaire Cage is a 89 y.o. female  who presents today for evaluation of right posterior knee pain.  She complains of pain in the right posterior aspect of the knee.  Symptoms are worse with prolonged standing.  She has an ultrasound which showed negative DVT and positive 3 cm popliteal cyst.  She presents today for popliteal cyst aspiration.    Review of Systems   Constitutional: Negative for chills and fever.   HENT: Negative for congestion and tinnitus.    Eyes: Negative for blurred vision and photophobia.   Respiratory: Negative for shortness of breath and wheezing.    Cardiovascular: Negative for chest pain and palpitations.   Gastrointestinal: Negative for nausea and vomiting.   Genitourinary: Negative for dysuria and frequency.   Musculoskeletal: Positive for joint pain. Negative for myalgias.   Skin: Negative for itching and rash.   Neurological: Negative for speech change and focal weakness.   Endo/Heme/Allergies: Negative for environmental allergies. Does not bruise/bleed easily.   Psychiatric/Behavioral: Negative for depression. The patient is not nervous/anxious.         Social History     Socioeconomic History    Marital status: Legally    Tobacco Use    Smoking status: Former Smoker     Packs/day: 0.35     Years: 44.00     Pack years: 15.40     Types: Cigarettes     Start date: 1970    Smokeless tobacco: Never Used    Tobacco comment: 1/08/2015   Substance and Sexual Activity    Alcohol use: No     Alcohol/week: 0.0 standard drinks    Drug use: No    Sexual activity: Not Currently     Social Determinants of Health     Financial Resource Strain: Low Risk     Difficulty of Paying Living Expenses: Not hard at all   Food Insecurity: No Food Insecurity    Worried About Running Out of Food in the Last Year:  Never true    Ran Out of Food in the Last Year: Never true   Transportation Needs: No Transportation Needs    Lack of Transportation (Medical): No    Lack of Transportation (Non-Medical): No   Physical Activity: Inactive    Days of Exercise per Week: 0 days    Minutes of Exercise per Session: 0 min   Stress: No Stress Concern Present    Feeling of Stress : Only a little   Social Connections: Unknown    Frequency of Communication with Friends and Family: More than three times a week    Frequency of Social Gatherings with Friends and Family: More than three times a week    Active Member of Clubs or Organizations: No    Attends Club or Organization Meetings: Never    Marital Status:    Housing Stability: Low Risk     Unable to Pay for Housing in the Last Year: No    Number of Places Lived in the Last Year: 2    Unstable Housing in the Last Year: No       Family History   Problem Relation Age of Onset    Hypertension Mother     Heart disease Mother     Lung disease Father     Diabetes Sister     Diabetes Brother     Kidney disease Son        Current Outpatient Medications on File Prior to Visit   Medication Sig Dispense Refill    albuterol (PROVENTIL) 2.5 mg /3 mL (0.083 %) nebulizer solution USE 1 VIAL IN NEBULIZER EVERY 6 HOURS AS NEEDED FOR WHEEZING. RESCUE 300 mL 3    allopurinoL (ZYLOPRIM) 100 MG tablet Take 100 mg by mouth once daily.      amLODIPine (NORVASC) 5 MG tablet Take 5 mg by mouth 2 (two) times daily.      ascorbic acid, vitamin C, (VITAMIN C) 500 MG tablet Take 500 mg by mouth once daily.      calcium carbonate (OS-TASIA) 500 mg calcium (1,250 mg) tablet Take 1 tablet by mouth 2 (two) times daily.      diclofenac sodium (VOLTAREN) 1 % Gel Apply 2 g topically once daily. (Patient taking differently: Apply 2 g topically as needed.) 100 g prn    diltiaZEM (CARDIZEM CD) 180 MG 24 hr capsule Take 1 capsule (180 mg total) by mouth once daily. 90 capsule 3    ferrous sulfate  (FEOSOL) 325 mg (65 mg iron) Tab tablet Take 325 mg by mouth once daily.      fish oil-omega-3 fatty acids 300-1,000 mg capsule Take 2 g by mouth every evening.      fluticasone (FLONASE) 50 mcg/actuation nasal spray 2 sprays by Each Nare route once daily. (Patient taking differently: 2 sprays by Each Nostril route as needed.) 48 g 3    fluticasone-umeclidin-vilanter (TRELEGY ELLIPTA) 200-62.5-25 mcg inhaler Inhale 1 puff into the lungs once daily. 60 each 11    folic acid-vit B6-vit B12 2.5-25-2 mg (FOLBIC) 2.5-25-2 mg Tab Take 1 tablet by mouth once daily.      furosemide (LASIX) 20 MG tablet Take 1 tablet only as needed, for swelling, increase SOB, weight gain of 3 lbs overnight or 5 lbs for the week (Patient taking differently: Take 20 mg by mouth as needed. Take 1 tablet only as needed, for swelling, increase SOB, weight gain of 3 lbs overnight or 5 lbs for the week) 90 tablet 3    ketoconazole (NIZORAL) 2 % shampoo Apply topically twice a week. (Patient taking differently: Apply 1 application topically twice a week.) 120 mL 2    levothyroxine (SYNTHROID) 88 MCG tablet Take 1 tablet (88 mcg total) by mouth once daily. 90 tablet 3    losartan (COZAAR) 25 MG tablet Take 1 tablet (25 mg total) by mouth once daily. 90 tablet 3    magnesium oxide (MAG-OX) 400 mg (241.3 mg magnesium) tablet Take 1 tablet by mouth once daily (Patient taking differently: Take 400 mg by mouth once daily.) 90 tablet 3    meclizine (ANTIVERT) 25 mg tablet Take 1 tablet (25 mg total) by mouth 3 (three) times daily as needed. 20 tablet prn    montelukast (SINGULAIR) 10 mg tablet Take 1 tablet (10 mg total) by mouth every evening. (Patient taking differently: Take 10 mg by mouth nightly as needed.) 90 tablet 3    multivitamin (THERAGRAN) tablet Take 1 tablet by mouth once daily.      nitroGLYCERIN (NITROSTAT) 0.4 MG SL tablet Place 1 tablet (0.4 mg total) under the tongue every 5 (five) minutes as needed for Chest pain. As  needed (Patient taking differently: Place 0.4 mg under the tongue every 5 (five) minutes as needed for Chest pain. Seek medical help if pain is not relieved after the third dose.) 30 tablet 3    omeprazole (PRILOSEC) 40 MG capsule Take 1 capsule by mouth once daily (Patient taking differently: Take 40 mg by mouth once daily.) 90 capsule 3    polyethylene glycol (GLYCOLAX) 17 gram/dose powder Take 17 g by mouth 2 (two) times a day.      rosuvastatin (CRESTOR) 20 MG tablet Take 1 tablet (20 mg total) by mouth once daily. 90 tablet 3    sertraline (ZOLOFT) 25 MG tablet Take 1 tablet (25 mg total) by mouth once daily. 90 tablet 3    tamsulosin (FLOMAX) 0.4 mg Cap Take 1 capsule (0.4 mg total) by mouth once daily. 30 capsule 0    vitamin D 1000 units Tab Take 1 tablet (1,000 Units total) by mouth once daily. 90 tablet 3     No current facility-administered medications on file prior to visit.        Review of patient's allergies indicates:   Allergen Reactions    Carvedilol Other (See Comments)     Bradycardia and syncope    Boniva [ibandronate]     Codeine     Hydralazine analogues     Iodinated contrast media Hives    Kionex [sodium polystyrene sulfonate] Diarrhea     From encounter 12/11/17 for diarrhea--not true allergy.    Lisinopril     Morphine        Past Surgical History:   Procedure Laterality Date    APPENDECTOMY      CHOLECYSTECTOMY      COLONOSCOPY Left 10/29/2020    Procedure: COLONOSCOPY;  Surgeon: Singh Kee III, MD;  Location: [facility];  Service: Endoscopy;  Laterality: Left;    ESOPHAGOGASTRODUODENOSCOPY Left 10/26/2020    Procedure: EGD (ESOPHAGOGASTRODUODENOSCOPY);  Surgeon: Kareem Gramajo MD;  Location: [facility];  Service: Endoscopy;  Laterality: Left;    ESOPHAGOGASTRODUODENOSCOPY Left 10/28/2020    Procedure: EGD (ESOPHAGOGASTRODUODENOSCOPY);  Surgeon: Kareem Gramajo MD;  Location: [facility];  Service: Endoscopy;  Laterality: Left;    ESOPHAGOGASTRODUODENOSCOPY N/A  9/16/2021    Procedure: EGD (ESOPHAGOGASTRODUODENOSCOPY);  Surgeon: Kareem Gramajo MD;  Location: [facility] ENDO;  Service: Endoscopy;  Laterality: N/A;    FRACTURE SURGERY      right hip     HERNIA REPAIR      groin    HYSTERECTOMY      INSERTION OF IMPLANTABLE LOOP RECORDER N/A 12/12/2018    Procedure: Insertion, Implantable Loop Recorder;  Surgeon: Ashok Herbert MD;  Location: [facility] CATH LAB;  Service: Cardiology;  Laterality: N/A;    PERCUTANEOUS PINNING OF HIP Left 3/23/2019    Procedure: PINNING, HIP, PERCUTANEOUS;  Surgeon: Jorje Hamlin MD;  Location: [facility] OR;  Service: Orthopedics;  Laterality: Left;    SMALL BOWEL ENTEROSCOPY N/A 10/29/2020    Procedure: ENTEROSCOPY;  Surgeon: Singh Kee III, MD;  Location: [facility] ENDO;  Service: Endoscopy;  Laterality: N/A;    VARICOSE VEIN SURGERY         Past Medical History:   Diagnosis Date    Anemia due to multiple mechanisms 6/29/2018    Anemia, chronic disease 6/29/2018    Anemia, chronic renal failure, stage 3 (moderate) 6/29/2018    Anticoagulant long-term use     aspirin    Aortic aneurysm     Chest pain     CHF (congestive heart failure)     COPD (chronic obstructive pulmonary disease)     COPD with acute exacerbation 1/9/2015    Depression     Diabetes mellitus type II     no longer on any DM meds    DVT (deep venous thrombosis) 06/09/2018    Encounter for blood transfusion     GERD (gastroesophageal reflux disease)     GI bleed     Gout     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 8/7/2018    Hip arthritis 3/1/2016    Homocysteinemia 8/7/2018    Hyperlipidemia     Hypertension     Incontinent of urine     Normocytic normochromic anemia 6/29/2018    Oxygen dependent     use oxygen as needed    PE (pulmonary thromboembolism) 06/09/2018    Pneumonia of right lower lobe due to infectious organism 9/11/2017    Syncope     Thyroid disease     hypothyroid    Type 2 diabetes mellitus with stage 3 chronic kidney disease  1/18/2013    UTI (urinary tract infection)        There were no vitals filed for this visit.  Physical Exam  Constitutional:       General: She is not in acute distress.     Appearance: Normal appearance.   HENT:      Head: Normocephalic and atraumatic.      Nose: Nose normal. No congestion.      Mouth/Throat:      Mouth: Mucous membranes are moist.      Pharynx: Oropharynx is clear.   Eyes:      Extraocular Movements: Extraocular movements intact.      Pupils: Pupils are equal, round, and reactive to light.   Neck:      Trachea: Trachea and phonation normal.   Pulmonary:      Effort: Pulmonary effort is normal. No respiratory distress.   Abdominal:      General: Abdomen is flat. There is no distension.   Musculoskeletal:      Right knee: No effusion.   Skin:     General: Skin is warm and dry.   Neurological:      General: No focal deficit present.      Mental Status: She is alert and oriented to person, place, and time.      Gait: Gait abnormal (Walks with a Rollator).   Psychiatric:         Mood and Affect: Mood and affect normal.         Behavior: Behavior normal.        Right Knee Exam     Tenderness   The patient is experiencing tenderness in the medial joint line and medial hamstring.    Range of Motion   The patient has normal right knee ROM.    Other   Swelling: none  Effusion: no effusion present         Four views of the right knee     Clinical history is pain after fall     There is osteopenia. There are no fractures, dislocations or acute osseous abnormalities. There are mild degenerative changes of the medial joint space and patellofemoral joint.     IMPRESSION: Osteopenia with degenerative changes of the right knee     No acute osseous abnormality    US LOWER EXTREMITY VEINS BILATERAL     CLINICAL HISTORY:  89 years Female edema both extremities     FINDINGS: Grayscale, color and spectral Doppler analysis of the bilateral lower extremity deep venous system was performed.     There is normal  compressibility, with normal flow by color and spectral Doppler analysis in the right extremity deep venous system, with normal augmentation and no evidence of deep venous thrombosis. Right posterior tibial vein was not well visualized. There is a right popliteal fossa cyst measuring 3.1 cm.     There is normal compressibility, with normal flow by color and spectral Doppler analysis in the left  extremity deep venous system, with normal augmentation and no evidence of deep venous thrombosis.     IMPRESSION:     Negative for DVT.     Non-visualization right PT vein.     Right popliteal fossa cyst.       Chronic pain of right knee    Popliteal cyst, right    Primary osteoarthritis of right knee           PLAN:   Blaire Cage is a 89 y.o. female with right posterior knee pain secondary to a popliteal cyst.  On today's visit, performed a popliteal cyst aspiration which yielded 5 cc of joint fluid.  I then injected 20 mg of Kenalog.  The patient tolerated the procedure well with no adverse events.  She will return to clinic as needed        Joe Reyez D.O.  Physical Medicine and Rehabilitation          CC: Patients PCP: Eli Edmonds MD  CC: Referring Provider: No ref. provider found

## 2022-02-21 NOTE — TELEPHONE ENCOUNTER
Sister called and notified about glucose level.   She states she will go ahead and get her to eat.

## 2022-02-21 NOTE — TELEPHONE ENCOUNTER
----- Message from Loreta Fisher sent at 2/21/2022  2:08 PM CST -----  Regarding: Blood Pressure Log  I have Blaire Niles's bp log. Her daughter is dropping her the log and is concerned it is running high. Her daughter's name is Annabella Guerra and her cell is on the log. The log will be in Vb's box.    Thank you,    Loreta

## 2022-02-24 NOTE — TELEPHONE ENCOUNTER
Showed the bp log to Dr. De La Fuente and Cece, we will increase her losartan to bid. Continue to monitor and bring back a bp log so we can see if the increasing has worked.     lvm on the daughter's vm with the instructions.

## 2022-02-25 NOTE — TELEPHONE ENCOUNTER
----- Message from Yassine Chopra sent at 2/25/2022 10:01 AM CST -----  Contact: daughter  Missed call please call back     [phone]

## 2022-02-26 NOTE — TELEPHONE ENCOUNTER
Refill Authorization Note   Blaire Cage  is requesting a refill authorization.  Brief Assessment and Rationale for Refill:  Approve     Medication Therapy Plan:  MRM resolved    Medication Reconciliation Completed: No   Comments:   --->Care Gap information included below if applicable.   Orders Placed This Encounter    levothyroxine (SYNTHROID) 88 MCG tablet      Requested Prescriptions   Signed Prescriptions Disp Refills    levothyroxine (SYNTHROID) 88 MCG tablet 90 tablet 3     Sig: Take 1 tablet by mouth once daily       Endocrinology:  Hypothyroid Agents Failed - 2/26/2022 12:33 PM        Failed - TSH in normal range and within 360 days     TSH   Date Value Ref Range Status   02/07/2022 14.220 (H) 0.340 - 5.600 uIU/mL Final   07/04/2021 5.980 (H) 0.340 - 5.600 uIU/mL Final   04/07/2021 2.342 0.400 - 4.000 uIU/mL Final              Passed - Patient is at least 18 years old        Passed - Valid encounter within last 15 months     Recent Visits  Date Type Provider Dept   01/28/22 Office Visit Eli Edmonds MD [facility]   10/29/21 Office Visit Eli Edmonds MD [facility]   01/05/21 Office Visit Eli Edmonds MD [facility]   11/09/20 Office Visit Eli Edmonds MD [facility]   05/30/20 Office Visit Eli Edmonds MD [facility]   05/13/20 Office Visit Eli Edmonds MD [facility]   Showing recent visits within past 720 days and meeting all other requirements  Future Appointments  No visits were found meeting these conditions.  Showing future appointments within next 150 days and meeting all other requirements      Future Appointments              In 1 week LAB, [facility], [facility]    In 1 week INJECTION CHAIR, [facility], [facility]    In 2 weeks Issac Alvarez MD [facility] - Hematology Oncology, [facility]    In 3 weeks LAB, [facility], [facility]    In 3 weeks  INJECTION CHAIR, [facility], [facility]    In 1 month Tylor De La Fuente MD [facility] - Cardiology, O at [facility]    In 2 months MD [facility] - Family Medicine, [place]    In 2 months Sadie Loredo MD [place] [facility] - Pulmonary, [place] [facility]    In 3 months Roc Park, DPM [facility] - Podiatry, O at [facility]    In 5 months MD [facility] - Pulmonary, [place] [facility]    In 6 months LAB, [facility] - Lab, [place]                Passed - Manual Review: FT4 is not required if last TSH is WNL.        Passed - T4 free within 1080 days     Free T4   Date Value Ref Range Status   02/07/2022 0.57 (L) 0.71 - 1.51 ng/dL Final   07/04/2021 0.58 (L) 0.71 - 1.51 ng/dL Final   04/07/2021 1.10 0.71 - 1.51 ng/dL Final                  Appointments  past 12m or future 3m with PCP    Date Provider   Last Visit   1/28/2022 Eli Edmonds MD   Next Visit   4/28/2022 Eil Edmonds MD   ED visits in past 90 days: 1     Note composed:5:59 PM 02/26/2022

## 2022-02-26 NOTE — TELEPHONE ENCOUNTER
No new care gaps identified.  Powered by [employer]. Reference number: [id number].   2/26/2022 12:34:36 PM CST

## 2022-02-28 NOTE — TELEPHONE ENCOUNTER
----- Message from Jimmy Rae sent at 2/28/2022 12:32 PM CST -----  Regarding: medical questions  Contact: Daughter, Annabella Winchester want to speak with a nurse regarding some medical questions, please call back at [phone] (home)     Case number [id number]

## 2022-02-28 NOTE — TELEPHONE ENCOUNTER
Spoke with pt daughter, let her know the following recommendation was left on voicemail regarding medication change. Pt daughter verbalized understanding and stated she will start pt on the change today and keep a log.       Showed the bp log to Dr. De La Fuente and Cece, we will increase her losartan to bid. Continue to monitor and bring back a bp log so we can see if the increasing has worked.      lvm on the daughter's vm with the instructions.

## 2022-03-08 NOTE — PLAN OF CARE
Problem: Anemia  Goal: Anemia Symptom Improvement  3/8/2022 1345 by Marie Christensen RN  Outcome: Met  3/8/2022 1345 by Marie Christensen RN  Outcome: Ongoing, Progressing

## 2022-03-14 NOTE — PROGRESS NOTES
[facility] Hematology/Oncology  PROGRESS NOTE - Follow-up Visit        Subjective:       Patient ID:   NAME: Blaire Cage : [phone]     89 y.o. female    Referring Doc: Sandro  Other Physicians: Cande Garg (PA); Eli Edmonds (PCP); Jeffrey Christopher (Pulm); Sandra (GI)    Chief Complaint:  DVT and PE f/u    History of Present Illness:     Patient is being seen today in person. The patient is on today to go over the results of the recently ordered labs, tests and studies. She is here with her  daughter today. She is breathing ok. She denies any swelling in the legs. She denies any CP, SOB, HA's. No excessive bleeding or bruising.      She is using walker today;      She previously had had a scope with Dr Gramajo and had several areas that were cauterized. She had several EGD's and colonoscopies and even had a capsule endoscopy; she sees him again in 6 months      She seems to be doing better since coming off of the blood thinners    Discussed COVID19 precautions -she had her vaccination       ROS:   GEN: normal without any fever, night sweats or weight loss  HEENT: normal with no HA's, sore throat, stiff neck, changes in vision  CV: normal with no CP, SOB, PND, WEST or orthopnea  PULM: normal with no SOB, cough, hemoptysis, sputum or pleuritic pain  GI: some recent N/V, and diarrhea  : normal with no hematuria, dysuria  BREAST: normal with no mass, discharge, pain  SKIN: normal with no rash, erythema, bruising, or swelling    Allergies:  Review of patient's allergies indicates:   Allergen Reactions    Carvedilol Other (See Comments)     Bradycardia and syncope    Boniva [ibandronate]     Codeine     Hydralazine analogues     Iodinated contrast- oral and iv dye Hives    Kionex [sodium polystyrene sulfonate] Diarrhea     From encounter 17 for diarrhea--not true allergy.    Lisinopril     Morphine        Medications:    Current Outpatient Medications:     albuterol (PROVENTIL) 2.5 mg /3 mL (0.083 %)  nebulizer solution, USE 1 VIAL IN NEBULIZER EVERY 6 HOURS AS NEEDED FOR WHEEZING. RESCUE, Disp: 300 mL, Rfl: 3    allopurinoL (ZYLOPRIM) 100 MG tablet, Take 100 mg by mouth once daily., Disp: , Rfl:     ascorbic acid, vitamin C, (VITAMIN C) 500 MG tablet, Take 500 mg by mouth once daily., Disp: , Rfl:     calcium carbonate (OS-TASIA) 500 mg calcium (1,250 mg) tablet, Take 1 tablet by mouth 2 (two) times daily., Disp: , Rfl:     diclofenac sodium (VOLTAREN) 1 % Gel, Apply 2 g topically once daily. (Patient taking differently: Apply 2 g topically as needed.), Disp: 100 g, Rfl: prn    diltiaZEM (CARDIZEM CD) 180 MG 24 hr capsule, Take 1 capsule (180 mg total) by mouth once daily., Disp: 90 capsule, Rfl: 3    ferrous sulfate (FEOSOL) 325 mg (65 mg iron) Tab tablet, Take 325 mg by mouth once daily., Disp: , Rfl:     fish oil-omega-3 fatty acids 300-1,000 mg capsule, Take 2 g by mouth every evening., Disp: , Rfl:     fluticasone (FLONASE) 50 mcg/actuation nasal spray, 2 sprays by Each Nare route once daily. (Patient taking differently: 2 sprays by Each Nostril route as needed.), Disp: 48 g, Rfl: 3    fluticasone-umeclidin-vilanter (TRELEGY ELLIPTA) 200-62.5-25 mcg inhaler, Inhale 1 puff into the lungs once daily., Disp: 60 each, Rfl: 11    folic acid-vit B6-vit B12 2.5-25-2 mg (FOLBIC) 2.5-25-2 mg Tab, Take 1 tablet by mouth once daily., Disp: , Rfl:     furosemide (LASIX) 20 MG tablet, Take 1 tablet only as needed, for swelling, increase SOB, weight gain of 3 lbs overnight or 5 lbs for the week (Patient taking differently: Take 20 mg by mouth as needed. Take 1 tablet only as needed, for swelling, increase SOB, weight gain of 3 lbs overnight or 5 lbs for the week), Disp: 90 tablet, Rfl: 3    ketoconazole (NIZORAL) 2 % shampoo, Apply topically twice a week. (Patient taking differently: Apply 1 application topically twice a week.), Disp: 120 mL, Rfl: 2    levothyroxine (SYNTHROID) 88 MCG tablet, Take 1 tablet by  mouth once daily, Disp: 90 tablet, Rfl: 3    losartan (COZAAR) 25 MG tablet, Take 1 tablet (25 mg total) by mouth 2 (two) times a day., Disp: 180 tablet, Rfl: 3    magnesium oxide (MAG-OX) 400 mg (241.3 mg magnesium) tablet, Take 1 tablet by mouth once daily (Patient taking differently: Take 400 mg by mouth once daily.), Disp: 90 tablet, Rfl: 3    meclizine (ANTIVERT) 25 mg tablet, Take 1 tablet (25 mg total) by mouth 3 (three) times daily as needed., Disp: 20 tablet, Rfl: prn    montelukast (SINGULAIR) 10 mg tablet, Take 1 tablet (10 mg total) by mouth every evening. (Patient taking differently: Take 10 mg by mouth nightly as needed.), Disp: 90 tablet, Rfl: 3    multivitamin (THERAGRAN) tablet, Take 1 tablet by mouth once daily., Disp: , Rfl:     nitroGLYCERIN (NITROSTAT) 0.4 MG SL tablet, Place 1 tablet (0.4 mg total) under the tongue every 5 (five) minutes as needed for Chest pain. As needed (Patient taking differently: Place 0.4 mg under the tongue every 5 (five) minutes as needed for Chest pain. Seek medical help if pain is not relieved after the third dose.), Disp: 30 tablet, Rfl: 3    omeprazole (PRILOSEC) 40 MG capsule, Take 1 capsule by mouth once daily (Patient taking differently: Take 40 mg by mouth once daily.), Disp: 90 capsule, Rfl: 3    polyethylene glycol (GLYCOLAX) 17 gram/dose powder, Take 17 g by mouth 2 (two) times a day., Disp: , Rfl:     rosuvastatin (CRESTOR) 20 MG tablet, Take 1 tablet (20 mg total) by mouth once daily., Disp: 90 tablet, Rfl: 3    sertraline (ZOLOFT) 25 MG tablet, Take 1 tablet (25 mg total) by mouth once daily., Disp: 90 tablet, Rfl: 3    vitamin D 1000 units Tab, Take 1 tablet (1,000 Units total) by mouth once daily., Disp: 90 tablet, Rfl: 3    tamsulosin (FLOMAX) 0.4 mg Cap, Take 1 capsule (0.4 mg total) by mouth once daily., Disp: 30 capsule, Rfl: 0    PMHx/PSHx Updates:  See patient's last visit with me on 12/14/2021  See H&P on  "6/29/2018        Pathology:  Cancer Staging  No matching staging information was found for the patient.          Objective:     Vitals:  Blood pressure (!) 188/50, pulse 74, temperature 97.8 °F (36.6 °C), resp. rate 16, height 5' 3" (1.6 m), weight 56.2 kg (124 lb).        Physical Examination:   GEN: no apparent distress, comfortable; AAOx3  HEAD: atraumatic and normocephalic  EYES: no conjunctival pallor or muddiness, no icterus; normal pupil reaction to ambient light  ENT: OMM, no pharyngeal erythema, external bilateral ears WNL; no visible thrush or ulcers  NECK: no masses or swelling, trachea midline, no visible LAD/LN's   CV: no palpitations; no pedal edema; no noticeable JVD or neck vein distension  CHEST: Normal respiratory effort; chest wall breath movements symmetrical; no audible wheezing  ABDOM: non-distended; no bloating  MUSC/Skeletal: ROM normal; joints visibly normal; no deformities; using wheelchair today; some arthropathy  EXTREM: no clubbing, cyanosis, inflammation or swelling  SKIN: no rashes, lesions, ulcers, petechiae or subcutaneous nodules; vitiligo and chronic age related skin changes  : no carter  NEURO: moving all 4 extremities; AAOx3; no tremors  PSYCH: normal mood, affect and behavior  LYMPH: no visible LN's or LAD  LN's             Labs:          Lab Results   Component Value Date    WBC 7.07 03/07/2022    HGB 9.5 (L) 03/07/2022    HCT 29.7 (L) 03/07/2022    MCV 97 03/07/2022     03/07/2022            BMP  Lab Results   Component Value Date     02/21/2022    K 4.1 02/21/2022     02/21/2022    CO2 23 02/21/2022    BUN 34 (H) 02/21/2022    CREATININE 1.6 (H) 02/21/2022    CALCIUM 8.5 (L) 02/21/2022    ANIONGAP 12 02/21/2022    ESTGFRAFRICA 32.2 (A) 02/21/2022    EGFRNONAA 28.0 (A) 02/21/2022        Lab Results   Component Value Date    IRON 56 02/21/2022    TIBC 239 (L) 02/21/2022    FERRITIN 474 (H) 02/21/2022     Lab Results   Component Value Date    [id number] " 12/06/2021     Lab Results   Component Value Date    FOLATE 35.0 (H) 12/06/2021           Radiology/Diagnostic Studies:    Ct Head Without Contrast    Result Date: 7/16/2018  EXAMINATION: CT HEAD WITHOUT CONTRAST CLINICAL HISTORY: Dizziness; TECHNIQUE: 5 mm noncontrast axial images were acquired through the head. COMPARISON: CT head without contrast-11/29/2014 FINDINGS: The brain is normally formed with preserved gray-white matter junction differentiation. No evidence of acute/recent major vascular territory cerebral infarction, parenchymal hemorrhage, or intra-axial mass.  There is age-appropriate cerebral volume loss with associated compensatory enlargement of the ventricular system and widening of the CSF spaces over both cerebral convexities.  There are confluent areas of periventricular white matter hypoattenuation compatible with age-appropriate chronic microvascular ischemic changes. No hydrocephalus.  No effacement of the skull-base cisterns.  No extra-axial fluid collections or blood products. The paranasal sinuses and mastoid air cells are clear.  There is rightward bony nasal septal deviation.  The visualized orbits are unremarkable.  The bony calvarium and visualized facial bones show no acute abnormality.     No acute intracranial abnormality appreciated.  No interval detrimental change in the imaging appearance of the brain when compared to the previous study. Electronically signed by: Aubrey Davis MD Date:    07/16/2018 Time:    08:01    Radiology Report    Result Date: 7/15/2018  Ordered by an unspecified provider.      I have reviewed all available lab results and radiology reports.    Assessment/Plan:   (1) 89 y.o. female with diagnosis of a recent LLE DVT and Pulmonary emboli who has been referred by Dr Jo with Neph for evaluation by medical hematology/oncology. She was hospitalized [facility]. She has never had any clots before. No family hx/of clots.    - factor panel, protein C and S, ATIII  adequate  - antiphosphatidyl negative; LA negative  - homocysteine elevated at 20.2  - MTHFR-C heterozygous positive - discussed the genetic implications with her and her daughter  - prothrombin II negative    2/25/2021:  - she is on eliquis bid but plans to switch to xarelto in near future due to insurance    9/14/2021:  - she is on xarelto; no excessive bleeding or bruising    12/14/2021:  - continued on xarelto with no excessive gross bleeding or bruising  - discussed consideration for her to discontinue the xarelto at this time especially with her advanced age and risk for falls and the continued GI issues    3/15/2022:  - she has since been off of the xarelto  - she sees Dr Gramajo again in 6 months  - no current blood in stool      (2) COPD/asthma; former smoker     (3) Left ventricular dysfunction     (4) HTN and hypercholesterolemia     (5) CRI - stage III     (6) Anemia - most likely a multifactorial process with iron deficiency diagnosis, anemia of chronic disorders, anemia of chronic renal   - she is on iron and MVI  - iron panel and vitamins are adequate    2/25/2021:  - She had recent scope with Dr Gramajo and had several areas that were cauterized. She had several EGD's and colonoscopies and even had a capsule endoscopy  - hgb at 9.6 and iron panel is adequate    9/14/2021:  - latest hgb at 9.8  - iron was 18 in July 2021 and TIBC 154  - she is on iron po daily and folate    12/14/20221:  - latest hgb 9.9  - followed by Dr Gramajo with GI and on IV iron per his direction    3/15/2022:  - latest hgb at 9.5  - no current GIB  - she receives periodic IV irons through Dr Jo's direction      (7) DM and hypothyroidism    (8) Prior hip fracture - left - needed pin placement only    (9) Vertigo - seen by ENT            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- hypercoag work up Dr. Alvarez 2018-needs lifelong anticoag    Homocysteinemia    Anemia, chronic renal failure, stage 3 (moderate)    Chronic anemia    Iron  deficiency anemia due to chronic blood loss    Iron deficiency anemia, unspecified iron deficiency anemia type    Normocytic normochromic anemia    Macrocytic anemia    Anemia due to multiple mechanisms    Anemia due to stage 3 chronic kidney disease treated with erythropoietin    Anemia of chronic disease    Smoker, quit 5/2016, 25 pck-years          PLAN:  1. Continue Folbic for the hyperhomocysteinemia  2. Previously discontinued the xarelto due to advanced age, risk of falls and continued GI issues  3. Previously  discussed the genetic implications of the MTHFR-C in siblings and children  4. F/u with PCP, GI, neph etc  5.  monitor labs monthly for now (encouraged compliance)  6.  Recommended consideration for procrit - will leave up to nephrology's discretion    RTC in  6 months  Fax note to Eli Edmonds MD; [facility] [place] [facility] [place]    Discussion:       Total Time spent on patient:    I spent over 15 mins of time with the patient. Reviewed results of the recently ordered labs, tests, reports and studies; made directives with regards to the results. Over half of this time was spent couseling and coordinating care, making treatment and analytical decisions; ordering necessary labs, tests and studies; and discussing treatment options and setting up treatment plan(s) if indicated.        COVID-19 Discussion:    I had long discussion with patient and any applicable family about the COVID-19 coronavirus epidemic and the recommended precautions with regard to cancer and/or hematology patients. I have re-iterated the CDC recommendations for adequate hand washing, use of hand -like products, and coughing into elbow, etc. In addition, especially for our patients who are on chemotherapy and/or our otherwise immunocompromised patients, I have recommended avoidance of crowds, including movie theaters, restaurants, churches, etc. I have recommended avoidance of any sick or symptomatic family members  and/or friends. I have also recommended avoidance of any raw and unwashed food products, and general avoidance of food items that have not been prepared by themselves. The patient has been asked to call us immediately with any symptom developments, issues, questions or other general concerns.       Anticoagulation Discussion:    I had long discussion with patient and any applicable family members about the benefit and/or need for anticoagulation. I communicated about the risks of bleeding while on any anticoagulation, which could be serious and/or life-threatening, and which can occur at any time, regardless of degree of the level of anticoagulation. I expressed the need for compliance with any anticoagulation regimen and that failure to do so could potential lead to excessive bleeding, and risk to health and/or life. In particular, with patients on coumadin therapy, compliance with requested blood work is absolutely essential, as coumadin levels can vary from time to time, and failure to do so could potentially place the patient at risk for bleeding and/or clotting events which could be fatal. Patients on coumadin are encouraged to call the day after they have their levels drawn, as to obtain the appropriate instructions from my staff. Patients are aware that self-regulating or self-dosing of their medications is strictly prohibited.       I have explained all of the above in detail and the patient understands all of the current recommendation(s). I have answered all of their questions to the best of my ability and to their complete satisfaction.   The patient is to continue with the current management plan.            Electronically signed by Issac Alvarez MD                      Answers for HPI/ROS submitted by the patient on 3/14/2022  appetite change : No  unexpected weight change: No  mouth sores: No  visual disturbance: Yes  cough: No  shortness of breath: No  chest pain: No  abdominal pain:  No  diarrhea: No  frequency: No  back pain: No  rash: No  headaches: No  adenopathy: No  nervous/ anxious: No

## 2022-03-21 PROBLEM — J96.10 CHRONIC RESPIRATORY FAILURE: Status: ACTIVE | Noted: 2019-08-09

## 2022-03-21 NOTE — ED TRIAGE NOTES
"[facility], 89yo female with c/o passing out while on the toilet. Lasted "a few minutes" per family. She has been having intermittent abdominal cramping. No cramping now. Also c/o left hip pain lately. The leg itself started hurting today.   "

## 2022-03-22 PROBLEM — I16.0 HYPERTENSIVE URGENCY: Status: ACTIVE | Noted: 2022-01-01

## 2022-03-22 PROBLEM — I50.32 CHRONIC DIASTOLIC HEART FAILURE: Status: ACTIVE | Noted: 2020-08-18

## 2022-03-22 NOTE — H&P
[facility] Medicine History & Physical Examination   Patient Name: Blaire Cage  MRN: [id number]  Patient Class: IP- Inpatient   Admission Date: 3/21/2022  1:41 PM  Length of Stay: 0  Attending Physician: Manjit De La Paz MD  Primary Care Provider: Eli Edmonds MD  Face-to-Face encounter date: 03/21/2022  Code Status: Full Code     MPOA:  Chief Complaint: Loss of Consciousness (Passed out while on toilet. )        HISTORY OF PRESENT ILLNESS:   Blaire Cage is a 89 y.o. Black or  female with known history of   CKD stage 3 heart failure ejection fraction 55% hypertension COPD anemia diabetes DVT P used to be on Xarelto but was taken off with by physician according to family hypothyroidism COVID-19 in September 2021 chronic respiratory failure on 2 L nasal cannula gastric antral vascular ectasia, AAA being monitored as outpatient,  course in the ER today because she had had episode of syncope while in the restroom this morning Ho daughter weakness it it the time because she 1st call her said that she was about to faint and the nurses that she loss consciousness for about 10 minutes she recently has been taking Lasix daily due to episodes of leg swelling she has prescription for p.r.n. she has been taking daily for the past week she also has been having episodes of abdominal pain for the past 2 days with diarrhea for 5 times a day and low p.o. intake she had 1 episode of vomiting today none the previous days the abdominal pain has some radiation to the left lower extremity she denies chest pain shortness are breath.  Daughter says that over pressure usually has been very difficult to control her cardiologist Dr. De La Fuente had recently changed amlodipine to diltiazem in and also starting home losartan in recently she had to had hold losartan dose increased however daughter states this to the pressure was not well controlled when she arrived to the ER initially SBP was  around 200s she got 1 dose of from:  In the by ER and blood pressure improved for about 20 minutes and then laid on was again with a SBP around the 200s so we give a dose of labetalol IV 10 mg and she again had improvement of SBP to around 160s for about 30 minutes so we will start on a nicardipine drip and admit patient to cardiology unit.        REVIEW OF SYSTEMS:   10 Point Review of System was performed and was found to be negative except for that mentioned already in the HPI above.     PAST MEDICAL HISTORY:     Past Medical History:   Diagnosis Date    Anemia due to multiple mechanisms 6/29/2018    Anemia, chronic disease 6/29/2018    Anemia, chronic renal failure, stage 3 (moderate) 6/29/2018    Anticoagulant long-term use     aspirin    Aortic aneurysm     Chest pain     CHF (congestive heart failure)     COPD (chronic obstructive pulmonary disease)     COPD with acute exacerbation 1/9/2015    Depression     Diabetes mellitus type II     no longer on any DM meds    DVT (deep venous thrombosis) 06/09/2018    Encounter for blood transfusion     GERD (gastroesophageal reflux disease)     GI bleed     Gout     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 8/7/2018    Hip arthritis 3/1/2016    Homocysteinemia 8/7/2018    Hyperlipidemia     Hypertension     Incontinent of urine     Normocytic normochromic anemia 6/29/2018    Oxygen dependent     use oxygen as needed    PE (pulmonary thromboembolism) 06/09/2018    Pneumonia of right lower lobe due to infectious organism 9/11/2017    Syncope     Thyroid disease     hypothyroid    Type 2 diabetes mellitus with stage 3 chronic kidney disease 1/18/2013    UTI (urinary tract infection)        PAST SURGICAL HISTORY:     Past Surgical History:   Procedure Laterality Date    APPENDECTOMY      CHOLECYSTECTOMY      COLONOSCOPY Left 10/29/2020    Procedure: COLONOSCOPY;  Surgeon: Singh Kee III, MD;  Location: [facility];  Service: Endoscopy;   Laterality: Left;    ESOPHAGOGASTRODUODENOSCOPY Left 10/26/2020    Procedure: EGD (ESOPHAGOGASTRODUODENOSCOPY);  Surgeon: Kareem Gramajo MD;  Location: [facility];  Service: Endoscopy;  Laterality: Left;    ESOPHAGOGASTRODUODENOSCOPY Left 10/28/2020    Procedure: EGD (ESOPHAGOGASTRODUODENOSCOPY);  Surgeon: Kareem Gramajo MD;  Location: [facility];  Service: Endoscopy;  Laterality: Left;    ESOPHAGOGASTRODUODENOSCOPY N/A 9/16/2021    Procedure: EGD (ESOPHAGOGASTRODUODENOSCOPY);  Surgeon: Kareem Gramajo MD;  Location: [facility];  Service: Endoscopy;  Laterality: N/A;    FRACTURE SURGERY      right hip     HERNIA REPAIR      groin    HYSTERECTOMY      INSERTION OF IMPLANTABLE LOOP RECORDER N/A 12/12/2018    Procedure: Insertion, Implantable Loop Recorder;  Surgeon: Ashok Herbert MD;  Location: [facility] CATH LAB;  Service: Cardiology;  Laterality: N/A;    PERCUTANEOUS PINNING OF HIP Left 3/23/2019    Procedure: PINNING, HIP, PERCUTANEOUS;  Surgeon: Jorje Hamlin MD;  Location: [facility] OR;  Service: Orthopedics;  Laterality: Left;    SMALL BOWEL ENTEROSCOPY N/A 10/29/2020    Procedure: ENTEROSCOPY;  Surgeon: Singh Kee III, MD;  Location: [facility];  Service: Endoscopy;  Laterality: N/A;    VARICOSE VEIN SURGERY         ALLERGIES:   Carvedilol, Boniva [ibandronate], Codeine, Hydralazine analogues, Iodinated contrast media, Kionex [sodium polystyrene sulfonate], Lisinopril, and Morphine    FAMILY HISTORY:     Family History   Problem Relation Age of Onset    Hypertension Mother     Heart disease Mother     Lung disease Father     Diabetes Sister     Diabetes Brother     Kidney disease Son        SOCIAL HISTORY:     Social History     Tobacco Use    Smoking status: Former Smoker     Packs/day: 0.35     Years: 44.00     Pack years: 15.40     Types: Cigarettes     Start date: 1970    Smokeless tobacco: Never Used    Tobacco comment: 1/08/2015   Substance Use Topics    Alcohol use: No      Alcohol/week: 0.0 standard drinks        Social History     Substance and Sexual Activity   Sexual Activity Not Currently        HOME MEDICATIONS:     Prior to Admission medications    Medication Sig Start Date End Date Taking? Authorizing Provider   allopurinoL (ZYLOPRIM) 100 MG tablet Take 100 mg by mouth once daily. 8/21/20  Yes Historical Provider   ascorbic acid, vitamin C, (VITAMIN C) 500 MG tablet Take 500 mg by mouth once daily.   Yes Historical Provider   calcium carbonate (OS-TASIA) 500 mg calcium (1,250 mg) tablet Take 1 tablet by mouth 2 (two) times daily.   Yes Historical Provider   diclofenac sodium (VOLTAREN) 1 % Gel Apply 2 g topically once daily.  Patient taking differently: Apply 2 g topically as needed. 5/24/20  Yes Eli Edmonds MD   diltiaZEM (CARDIZEM CD) 180 MG 24 hr capsule Take 1 capsule (180 mg total) by mouth once daily. 12/29/21  Yes Jessi Velazquez PA-C   ferrous sulfate (FEOSOL) 325 mg (65 mg iron) Tab tablet Take 325 mg by mouth once daily.   Yes Historical Provider   fish oil-omega-3 fatty acids 300-1,000 mg capsule Take 2 g by mouth every evening.   Yes Historical Provider   fluticasone (FLONASE) 50 mcg/actuation nasal spray 2 sprays by Each Nare route once daily.  Patient taking differently: 2 sprays by Each Nostril route as needed. 12/20/17  Yes Jeffrey Christopher MD   fluticasone-umeclidin-vilanter (TRELEGY ELLIPTA) 200-62.5-25 mcg inhaler Inhale 1 puff into the lungs once daily. 2/2/22  Yes Sadie Loredo MD   folic acid-vit B6-vit B12 2.5-25-2 mg (FOLBIC) 2.5-25-2 mg Tab Take 1 tablet by mouth once daily.   Yes Historical Provider   furosemide (LASIX) 20 MG tablet Take 1 tablet only as needed, for swelling, increase SOB, weight gain of 3 lbs overnight or 5 lbs for the week  Patient taking differently: Take 20 mg by mouth as needed. Take 1 tablet only as needed, for swelling, increase SOB, weight gain of 3 lbs overnight or 5 lbs for the week 3/30/21  Yes Eli Edmonds MD    ketoconazole (NIZORAL) 2 % shampoo Apply topically twice a week.  Patient taking differently: Apply 1 application topically twice a week. 5/20/21  Yes Candace Valles MD   levothyroxine (SYNTHROID) 88 MCG tablet Take 1 tablet by mouth once daily  Patient taking differently: Take 88 mcg by mouth before breakfast. 2/26/22  Yes Eli Edmonds MD   losartan (COZAAR) 25 MG tablet Take 1 tablet (25 mg total) by mouth 2 (two) times a day.  Patient taking differently: Take 25 mg by mouth once daily. 3/11/22 3/11/23 Yes Sadie Perez NP   magnesium oxide (MAG-OX) 400 mg (241.3 mg magnesium) tablet Take 1 tablet by mouth once daily  Patient taking differently: Take 400 mg by mouth once daily. 1/9/21  Yes Eli Edmonds MD   montelukast (SINGULAIR) 10 mg tablet Take 1 tablet (10 mg total) by mouth every evening. 4/22/21  Yes Eli Edmonds MD   multivitamin (THERAGRAN) tablet Take 1 tablet by mouth once daily. 10/5/18  Yes JAUN C Qureshi   omeprazole (PRILOSEC) 40 MG capsule Take 1 capsule by mouth once daily  Patient taking differently: Take 40 mg by mouth once daily. 9/7/21  Yes Eli Edmonds MD   polyethylene glycol (GLYCOLAX) 17 gram/dose powder Take 17 g by mouth 2 (two) times a day.   Yes Historical Provider   rosuvastatin (CRESTOR) 20 MG tablet Take 1 tablet (20 mg total) by mouth once daily. 11/8/21 11/8/22 Yes Sadie Perez NP   sertraline (ZOLOFT) 25 MG tablet Take 1 tablet (25 mg total) by mouth once daily. 6/29/21  Yes Eli Edmonds MD   tamsulosin (FLOMAX) 0.4 mg Cap Take 1 capsule (0.4 mg total) by mouth once daily. 7/9/21 10/19/21 Yes Cristal Car MD   vitamin D 1000 units Tab Take 1 tablet (1,000 Units total) by mouth once daily. 8/26/16  Yes Luis Manuel Mauricio MD   amLODIPine (NORVASC) 5 MG tablet Take 5 mg by mouth once daily.  3/21/22 Yes Historical Provider   albuterol (PROVENTIL) 2.5 mg /3 mL (0.083 %) nebulizer solution USE 1 VIAL IN NEBULIZER EVERY 6 HOURS AS NEEDED FOR  "WHEEZING. RESCUE  Patient taking differently: Take 2.5 mg by nebulization every 6 (six) hours as needed for Wheezing or Shortness of Breath. USE 1 VIAL IN NEBULIZER EVERY 6 HOURS AS NEEDED FOR WHEEZING. RESCUE 2/2/22   Sadie Loredo MD   meclizine (ANTIVERT) 25 mg tablet Take 1 tablet (25 mg total) by mouth 3 (three) times daily as needed. 11/9/20   Eli Edmonds MD   nitroGLYCERIN (NITROSTAT) 0.4 MG SL tablet Place 1 tablet (0.4 mg total) under the tongue every 5 (five) minutes as needed for Chest pain. As needed  Patient taking differently: Place 0.4 mg under the tongue every 5 (five) minutes as needed for Chest pain. Seek medical help if pain is not relieved after the third dose. 2/27/18   Alfredito Trevino MD         PHYSICAL EXAM:   /60   Pulse 62   Temp 98.3 °F (36.8 °C)   Resp 20   Ht 5' 3" (1.6 m)   Wt 56.2 kg (124 lb)   LMP  (LMP Unknown)   SpO2 95%   BMI 21.97 kg/m²   Vitals Reviewed  General appearance:female in no apparent distress.  Skin: No Rash.   Neuro: Motor and sensory exams grossly intact. Good tone. Power in all 4 extremities 5/5.   HENT: Atraumatic head. Moist mucous membranes of oral cavity.  Eyes: Normal extraocular movements.   Neck: Supple. No evidence of lymphadenopathy. No thyroidomegaly.  Lungs: Clear to auscultation bilaterally. No wheezing present.   Heart: Regular rate and rhythm. S1 and S2 present with no murmurs/gallop/rub. No pedal edema. No JVD present.   Abdomen: Soft, non-distended, non-tender. No rebound tenderness/guarding. Bowel sounds are normal. Bladder is not palpable.   Extremities: No cyanosis, clubbing.  Psych/mental status: Alert and oriented. Cooperative. Responds appropriately to questions.   EMERGENCY DEPARTMENT LABS AND IMAGING:     Labs Reviewed   CBC W/ AUTO DIFFERENTIAL - Abnormal; Notable for the following components:       Result Value    RBC 3.11 (*)     Hemoglobin 9.8 (*)     Hematocrit 31.7 (*)      (*)     MCH 31.5 (*)     MCHC " 30.9 (*)     RDW 15.3 (*)     Platelets 147 (*)     Immature Granulocytes 0.6 (*)     All other components within normal limits   COMPREHENSIVE METABOLIC PANEL - Abnormal; Notable for the following components:    Glucose 151 (*)     BUN 34 (*)     Creatinine 1.6 (*)     Calcium 8.1 (*)     Total Protein 5.3 (*)     Albumin 2.9 (*)     AST 61 (*)     ALT 51 (*)     eGFR if  32.2 (*)     eGFR if non  28.0 (*)     All other components within normal limits   B-TYPE NATRIURETIC PEPTIDE - Abnormal; Notable for the following components:     (*)     All other components within normal limits   URINALYSIS, REFLEX TO URINE CULTURE - Abnormal; Notable for the following components:    Protein, UA 2+ (*)     Occult Blood UA Trace (*)     Leukocytes, UA 2+ (*)     All other components within normal limits    Narrative:     Specimen Source->Urine   URINALYSIS MICROSCOPIC - Abnormal; Notable for the following components:    WBC, UA 9 (*)     Hyaline Casts, UA 2 (*)     All other components within normal limits    Narrative:     Specimen Source->Urine   SARS-COV-2 RNA AMPLIFICATION, QUAL   MAGNESIUM   TROPONIN I   B-TYPE NATRIURETIC PEPTIDE   MAGNESIUM   TROPONIN I   URINALYSIS, REFLEX TO URINE CULTURE       US Carotid Bilateral   Final Result      CT Abdomen Pelvis  Without Contrast   Final Result      CT Head Without Contrast   Final Result      X-Ray Chest AP Portable   Final Result          ASSESSMENT & PLAN:   Blaire Cage is a 89 y.o. female admitted for    HTN crisis  Syncope episode  CKD stage 3  Macrocitic anemia  H.o. iron deficeicny/.  COPD  Chronic respiratory failure on 2 L nasal cannula home  Hypertension  Diabetes  history of DVT and P  MTHFR mutation, off anticoagulatns by physician according to daughter,  Hypothyroidism  COVID-19 infection he September 2021  Gastric antral vascular ectasia      Plan  Admit patient to cardiology unit.  Continue nicardipine drip.  Hold home  medications of Cardizem p.o. 180 mg daily 1st dose now.  Echo ultrasound carotid  Continue with home medications  SVNs q.6 hours while awake    Diet: Diabetic    DVT Prophylaxis: heparin and Encourage ambulation. OOB as tolerated.     Discharge Planning and Disposition:  Home independent    Expected LOS: 2 midnights    This patient is high risk for life-threatening deterioration and death secondary to above comorbidities and need for IV treatment. This patient meets inpatient criteria.   ________________________________________________________________________________    Face-to-Face encounter date: 03/21/2022  Encounter included review of the medical records, interviewing and examining the patient face-to-face, discussion with family and other health care providers including emergency medicine physician, admission orders, interpreting lab/test results and formulating a plan of care.   Medical Decision Making during this encounter was High Complexity due to Patient has a condition that poses threat to life.  ________________________________________________________________________________    INPATIENT LIST OF MEDICATIONS     Current Facility-Administered Medications:     [START ON 3/22/2022] albuterol nebulizer solution 2.5 mg, 2.5 mg, Nebulization, Q6H WAKE, Annabella Tovar MD    [START ON 3/22/2022] allopurinoL tablet 100 mg, 100 mg, Oral, Daily, Manjit De La Paz MD    [START ON 3/22/2022] ascorbic acid (vitamin C) tablet 500 mg, 500 mg, Oral, Daily, Manjit De La Paz MD    [START ON 3/22/2022] atorvastatin tablet 80 mg, 80 mg, Oral, Daily, Manjit De La Paz MD    calcium carbonate 200 mg calcium (500 mg) chewable tablet 500 mg, 500 mg, Oral, BID, Manjit De La Paz MD    diltiaZEM 24 hr capsule 180 mg, 180 mg, Oral, Daily, Manjit De La Paz MD    [START ON 3/22/2022] ferrous sulfate tablet 1 each, 1 tablet, Oral, Daily, Manjit De La Paz MD    fluticasone furoate-vilanteroL 200-25 mcg/dose diskus inhaler 1  puff, 1 puff, Inhalation, Daily **AND** MDI Daily, , , Daily, Manjit De La Paz MD    [START ON 3/22/2022] fluticasone propionate 50 mcg/actuation nasal spray 100 mcg, 2 spray, Each Nostril, Daily, Manjit De La Paz MD    heparin (porcine) injection 5,000 Units, 5,000 Units, Subcutaneous, Q12H, Manjit De La Paz MD    [START ON 3/22/2022] levothyroxine tablet 88 mcg, 88 mcg, Oral, Before breakfast, Manjit De La Paz MD    meclizine tablet 25 mg, 25 mg, Oral, TID PRN, Manjit De La Paz MD    [START ON 3/22/2022] montelukast tablet 10 mg, 10 mg, Oral, Daily, Manjit De La Paz MD    niCARdipine 40 mg/200 mL (0.2 mg/mL) infusion, 0-15 mg/hr, Intravenous, Continuous, Manjit De La Paz MD, Last Rate: 12.5 mL/hr at 03/21/22 2247, 2.5 mg/hr at 03/21/22 2247    [START ON 3/22/2022] pantoprazole EC tablet 40 mg, 40 mg, Oral, Before breakfast, Manjit De La Paz MD    [START ON 3/22/2022] sertraline tablet 25 mg, 25 mg, Oral, Daily, Manjit De La Paz MD    sodium chloride 0.9% flush 2 mL, 2 mL, Intravenous, Q6H PRN, Manjit De La Paz MD    [START ON 3/22/2022] tamsulosin 24 hr capsule 0.4 mg, 0.4 mg, Oral, Daily, aMnjit De La Paz MD    [START ON 3/22/2022] vitamin D 1000 units tablet 1,000 Units, 1,000 Units, Oral, Daily, Manjit De La Paz MD    Current Outpatient Medications:     allopurinoL (ZYLOPRIM) 100 MG tablet, Take 100 mg by mouth once daily., Disp: , Rfl:     ascorbic acid, vitamin C, (VITAMIN C) 500 MG tablet, Take 500 mg by mouth once daily., Disp: , Rfl:     calcium carbonate (OS-TASIA) 500 mg calcium (1,250 mg) tablet, Take 1 tablet by mouth 2 (two) times daily., Disp: , Rfl:     diclofenac sodium (VOLTAREN) 1 % Gel, Apply 2 g topically once daily. (Patient taking differently: Apply 2 g topically as needed.), Disp: 100 g, Rfl: prn    diltiaZEM (CARDIZEM CD) 180 MG 24 hr capsule, Take 1 capsule (180 mg total) by mouth once daily., Disp: 90 capsule, Rfl: 3    ferrous sulfate (FEOSOL) 325 mg (65 mg iron) Tab  tablet, Take 325 mg by mouth once daily., Disp: , Rfl:     fish oil-omega-3 fatty acids 300-1,000 mg capsule, Take 2 g by mouth every evening., Disp: , Rfl:     fluticasone (FLONASE) 50 mcg/actuation nasal spray, 2 sprays by Each Nare route once daily. (Patient taking differently: 2 sprays by Each Nostril route as needed.), Disp: 48 g, Rfl: 3    fluticasone-umeclidin-vilanter (TRELEGY ELLIPTA) 200-62.5-25 mcg inhaler, Inhale 1 puff into the lungs once daily., Disp: 60 each, Rfl: 11    folic acid-vit B6-vit B12 2.5-25-2 mg (FOLBIC) 2.5-25-2 mg Tab, Take 1 tablet by mouth once daily., Disp: , Rfl:     furosemide (LASIX) 20 MG tablet, Take 1 tablet only as needed, for swelling, increase SOB, weight gain of 3 lbs overnight or 5 lbs for the week (Patient taking differently: Take 20 mg by mouth as needed. Take 1 tablet only as needed, for swelling, increase SOB, weight gain of 3 lbs overnight or 5 lbs for the week), Disp: 90 tablet, Rfl: 3    ketoconazole (NIZORAL) 2 % shampoo, Apply topically twice a week. (Patient taking differently: Apply 1 application topically twice a week.), Disp: 120 mL, Rfl: 2    levothyroxine (SYNTHROID) 88 MCG tablet, Take 1 tablet by mouth once daily (Patient taking differently: Take 88 mcg by mouth before breakfast.), Disp: 90 tablet, Rfl: 3    losartan (COZAAR) 25 MG tablet, Take 1 tablet (25 mg total) by mouth 2 (two) times a day. (Patient taking differently: Take 25 mg by mouth once daily.), Disp: 180 tablet, Rfl: 3    magnesium oxide (MAG-OX) 400 mg (241.3 mg magnesium) tablet, Take 1 tablet by mouth once daily (Patient taking differently: Take 400 mg by mouth once daily.), Disp: 90 tablet, Rfl: 3    montelukast (SINGULAIR) 10 mg tablet, Take 1 tablet (10 mg total) by mouth every evening., Disp: 90 tablet, Rfl: 3    multivitamin (THERAGRAN) tablet, Take 1 tablet by mouth once daily., Disp: , Rfl:     omeprazole (PRILOSEC) 40 MG capsule, Take 1 capsule by mouth once daily  (Patient taking differently: Take 40 mg by mouth once daily.), Disp: 90 capsule, Rfl: 3    polyethylene glycol (GLYCOLAX) 17 gram/dose powder, Take 17 g by mouth 2 (two) times a day., Disp: , Rfl:     rosuvastatin (CRESTOR) 20 MG tablet, Take 1 tablet (20 mg total) by mouth once daily., Disp: 90 tablet, Rfl: 3    sertraline (ZOLOFT) 25 MG tablet, Take 1 tablet (25 mg total) by mouth once daily., Disp: 90 tablet, Rfl: 3    tamsulosin (FLOMAX) 0.4 mg Cap, Take 1 capsule (0.4 mg total) by mouth once daily., Disp: 30 capsule, Rfl: 0    vitamin D 1000 units Tab, Take 1 tablet (1,000 Units total) by mouth once daily., Disp: 90 tablet, Rfl: 3    albuterol (PROVENTIL) 2.5 mg /3 mL (0.083 %) nebulizer solution, USE 1 VIAL IN NEBULIZER EVERY 6 HOURS AS NEEDED FOR WHEEZING. RESCUE (Patient taking differently: Take 2.5 mg by nebulization every 6 (six) hours as needed for Wheezing or Shortness of Breath. USE 1 VIAL IN NEBULIZER EVERY 6 HOURS AS NEEDED FOR WHEEZING. RESCUE), Disp: 300 mL, Rfl: 3    meclizine (ANTIVERT) 25 mg tablet, Take 1 tablet (25 mg total) by mouth 3 (three) times daily as needed., Disp: 20 tablet, Rfl: prn    nitroGLYCERIN (NITROSTAT) 0.4 MG SL tablet, Place 1 tablet (0.4 mg total) under the tongue every 5 (five) minutes as needed for Chest pain. As needed (Patient taking differently: Place 0.4 mg under the tongue every 5 (five) minutes as needed for Chest pain. Seek medical help if pain is not relieved after the third dose.), Disp: 30 tablet, Rfl: 3      Scheduled Meds:   [START ON 3/22/2022] albuterol sulfate  2.5 mg Nebulization Q6H WAKE    [START ON 3/22/2022] allopurinoL  100 mg Oral Daily    [START ON 3/22/2022] ascorbic acid (vitamin C)  500 mg Oral Daily    [START ON 3/22/2022] atorvastatin  80 mg Oral Daily    calcium carbonate  500 mg Oral BID    diltiaZEM  180 mg Oral Daily    [START ON 3/22/2022] ferrous sulfate  1 tablet Oral Daily    fluticasone furoate-vilanteroL  1 puff  Inhalation Daily    [START ON 3/22/2022] fluticasone propionate  2 spray Each Nostril Daily    heparin (porcine)  5,000 Units Subcutaneous Q12H    [START ON 3/22/2022] levothyroxine  88 mcg Oral Before breakfast    [START ON 3/22/2022] montelukast  10 mg Oral Daily    [START ON 3/22/2022] pantoprazole  40 mg Oral Before breakfast    [START ON 3/22/2022] sertraline  25 mg Oral Daily    [START ON 3/22/2022] tamsulosin  0.4 mg Oral Daily    [START ON 3/22/2022] vitamin D  1,000 Units Oral Daily     Continuous Infusions:   nicardipine 2.5 mg/hr (03/21/22 8119)     PRN Meds:.meclizine, sodium chloride 0.9%      Manjit Lucasa  [facility] Hospitalist  03/21/2022

## 2022-03-22 NOTE — ED NOTES
rec. report from fadi allen rn . Pt care assumed.  Pt resting comfortably and independently repositioned in stretcher with bed locked in lowest position for safety. NAD noted at this time. Respirations even and unlabored and visible chest rise noted.  Patient offered bathroom assistance and denies need at this time. Pt instructed to call if assistance is needed. Pt on continuous cardiac, BP, and O2 monitoring. Call light within reach. Family at bedside. No needs at this time. Will continue to monitor.

## 2022-03-22 NOTE — PLAN OF CARE
Important Message from Medicare was sign, explained and given to patient/caregiver on 03/22/2022 at 2:34pm     addressed any questions or concerns.    Important Message from Medicare document will be scanned into patient's medical record

## 2022-03-22 NOTE — ED PROVIDER NOTES
Encounter Date: 3/21/2022       History     Chief Complaint   Patient presents with    Loss of Consciousness     Passed out while on toilet.      Patient here reported syncopal event onset today the patient was seated on the toilet at the time she was complaining of some abdominal cramping daughter reports that she has been complaining of abdominal pain having some previous history of syncope labile blood pressures patient does have a history of congestive heart failure family reports that she passed out while seated on the toilet and did not recover until they later down flat on the floor the bathroom she did not injure herself here her blood pressures been elevated she is complaining of left-sided pain left headache        Review of patient's allergies indicates:   Allergen Reactions    Carvedilol Other (See Comments)     Bradycardia and syncope    Boniva [ibandronate]     Codeine     Hydralazine analogues     Iodinated contrast media Hives    Kionex [sodium polystyrene sulfonate] Diarrhea     From encounter 12/11/17 for diarrhea--not true allergy.    Lisinopril     Morphine      Past Medical History:   Diagnosis Date    Anemia due to multiple mechanisms 6/29/2018    Anemia, chronic disease 6/29/2018    Anemia, chronic renal failure, stage 3 (moderate) 6/29/2018    Anticoagulant long-term use     aspirin    Aortic aneurysm     Chest pain     CHF (congestive heart failure)     COPD (chronic obstructive pulmonary disease)     COPD with acute exacerbation 1/9/2015    Depression     Diabetes mellitus type II     no longer on any DM meds    DVT (deep venous thrombosis) 06/09/2018    Encounter for blood transfusion     GERD (gastroesophageal reflux disease)     GI bleed     Gout     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 8/7/2018    Hip arthritis 3/1/2016    Homocysteinemia 8/7/2018    Hyperlipidemia     Hypertension     Incontinent of urine     Normocytic normochromic anemia 6/29/2018     Oxygen dependent     use oxygen as needed    PE (pulmonary thromboembolism) 06/09/2018    Pneumonia of right lower lobe due to infectious organism 9/11/2017    Syncope     Thyroid disease     hypothyroid    Type 2 diabetes mellitus with stage 3 chronic kidney disease 1/18/2013    UTI (urinary tract infection)      Past Surgical History:   Procedure Laterality Date    APPENDECTOMY      CHOLECYSTECTOMY      COLONOSCOPY Left 10/29/2020    Procedure: COLONOSCOPY;  Surgeon: Singh Kee III, MD;  Location: [facility];  Service: Endoscopy;  Laterality: Left;    ESOPHAGOGASTRODUODENOSCOPY Left 10/26/2020    Procedure: EGD (ESOPHAGOGASTRODUODENOSCOPY);  Surgeon: Kareem Gramajo MD;  Location: [facility];  Service: Endoscopy;  Laterality: Left;    ESOPHAGOGASTRODUODENOSCOPY Left 10/28/2020    Procedure: EGD (ESOPHAGOGASTRODUODENOSCOPY);  Surgeon: Kareem Gramajo MD;  Location: [facility];  Service: Endoscopy;  Laterality: Left;    ESOPHAGOGASTRODUODENOSCOPY N/A 9/16/2021    Procedure: EGD (ESOPHAGOGASTRODUODENOSCOPY);  Surgeon: Kareem Gramajo MD;  Location: [facility];  Service: Endoscopy;  Laterality: N/A;    FRACTURE SURGERY      right hip     HERNIA REPAIR      groin    HYSTERECTOMY      INSERTION OF IMPLANTABLE LOOP RECORDER N/A 12/12/2018    Procedure: Insertion, Implantable Loop Recorder;  Surgeon: Ashok Herbert MD;  Location: [facility] CATH LAB;  Service: Cardiology;  Laterality: N/A;    PERCUTANEOUS PINNING OF HIP Left 3/23/2019    Procedure: PINNING, HIP, PERCUTANEOUS;  Surgeon: Jorje Hamlin MD;  Location: [facility] OR;  Service: Orthopedics;  Laterality: Left;    SMALL BOWEL ENTEROSCOPY N/A 10/29/2020    Procedure: ENTEROSCOPY;  Surgeon: Singh Kee III, MD;  Location: [facility];  Service: Endoscopy;  Laterality: N/A;    VARICOSE VEIN SURGERY       Family History   Problem Relation Age of Onset    Hypertension Mother     Heart disease Mother     Lung disease Father     Diabetes  Sister     Diabetes Brother     Kidney disease Son      Social History     Tobacco Use    Smoking status: Former Smoker     Packs/day: 0.35     Years: 44.00     Pack years: 15.40     Types: Cigarettes     Start date: 1970    Smokeless tobacco: Never Used    Tobacco comment: 1/08/2015   Substance Use Topics    Alcohol use: No     Alcohol/week: 0.0 standard drinks    Drug use: No     Review of Systems   Constitutional: Negative for chills and fever.   HENT: Negative for congestion.    Respiratory: Negative for shortness of breath.    Gastrointestinal: Positive for abdominal pain and nausea.   Neurological: Positive for syncope.       Physical Exam     Initial Vitals [03/21/22 1350]   BP Pulse Resp Temp SpO2   (!) 210/88 69 20 98.3 °F (36.8 °C) 97 %      MAP       --         Physical Exam    Constitutional: She appears well-developed and well-nourished. She appears distressed.   HENT:   Head: Normocephalic and atraumatic.   Right Ear: External ear normal.   Left Ear: External ear normal.   Mouth/Throat: Oropharynx is clear and moist.   Eyes: Conjunctivae and EOM are normal. Pupils are equal, round, and reactive to light.   Neck: Neck supple.   Normal range of motion.  Cardiovascular: Normal rate, regular rhythm, normal heart sounds and intact distal pulses.   Pulmonary/Chest: Breath sounds normal. No respiratory distress.   Abdominal: Abdomen is soft. Bowel sounds are normal. There is no abdominal tenderness.   Musculoskeletal:         General: No edema. Normal range of motion.      Cervical back: Normal range of motion and neck supple.     Neurological: She is alert. She has normal strength. GCS score is 15. GCS eye subscore is 4. GCS verbal subscore is 5. GCS motor subscore is 6.   Skin: Skin is warm and dry. Capillary refill takes less than 2 seconds. No rash noted.   Psychiatric: She has a normal mood and affect. Her behavior is normal.         ED Course   Procedures  Labs Reviewed   CBC W/ AUTO DIFFERENTIAL  - Abnormal; Notable for the following components:       Result Value    RBC 3.11 (*)     Hemoglobin 9.8 (*)     Hematocrit 31.7 (*)      (*)     MCH 31.5 (*)     MCHC 30.9 (*)     RDW 15.3 (*)     Platelets 147 (*)     Immature Granulocytes 0.6 (*)     All other components within normal limits   COMPREHENSIVE METABOLIC PANEL - Abnormal; Notable for the following components:    Glucose 151 (*)     BUN 34 (*)     Creatinine 1.6 (*)     Calcium 8.1 (*)     Total Protein 5.3 (*)     Albumin 2.9 (*)     AST 61 (*)     ALT 51 (*)     eGFR if  32.2 (*)     eGFR if non  28.0 (*)     All other components within normal limits   B-TYPE NATRIURETIC PEPTIDE - Abnormal; Notable for the following components:     (*)     All other components within normal limits   URINALYSIS, REFLEX TO URINE CULTURE - Abnormal; Notable for the following components:    Protein, UA 2+ (*)     Occult Blood UA Trace (*)     Leukocytes, UA 2+ (*)     All other components within normal limits    Narrative:     Specimen Source->Urine   URINALYSIS MICROSCOPIC - Abnormal; Notable for the following components:    WBC, UA 9 (*)     Hyaline Casts, UA 2 (*)     All other components within normal limits    Narrative:     Specimen Source->Urine   SARS-COV-2 RNA AMPLIFICATION, QUAL   MAGNESIUM   TROPONIN I   B-TYPE NATRIURETIC PEPTIDE   MAGNESIUM   TROPONIN I   URINALYSIS, REFLEX TO URINE CULTURE        ECG Results          EKG and show to ED MD (In process)  Result time 03/21/22 14:43:57    In process by Interface, Lab In [facility] (03/21/22 14:43:57)                 Narrative:    Test Reason : R55,    Vent. Rate : 071 BPM     Atrial Rate : 071 BPM     P-R Int : 174 ms          QRS Dur : 082 ms      QT Int : 430 ms       P-R-T Axes : 016 018 056 degrees     QTc Int : 467 ms    Normal sinus rhythm  Septal infarct (cited on or before 04-JUL-2021)  Abnormal ECG  When compared with ECG of 13-JAN-2022 14:15,  Nonspecific T  wave abnormality no longer evident in Anterior leads    Referred By: [email or web address]   SELF           Confirmed By:                             Imaging Results          US Carotid Bilateral (In process)                CT Abdomen Pelvis  Without Contrast (Final result)  Result time 03/21/22 15:47:43   Procedure changed from CT Abdomen Pelvis With Contrast     Final result by Jonna Lyn MD (03/21/22 15:47:43)                 Narrative:    CMS MANDATED QUALITY DATA - CT RADIATION - 436    All CT scans at this facility utilize dose modulation, iterative reconstruction, and/or weight based dosing when appropriate to reduce radiation dose to as low as reasonably achievable.    HISTORY: Abdominal pain    FINDINGS: Noncontrast axial images were obtained. Nonenhanced study is tailored for the detection of urolithiasis, and is insensitive for abnormalities of the solid organs, vasculature and hollow viscera.    CT ABDOMEN: The heart is enlarged. There is a small left pleural effusion. There is atelectasis in the right lung base.    The liver, spleen and pancreas have a normal noncontrast appearance. The gallbladder is absent.  The adrenal glands are normal.    The kidneys are symmetric in size without hydronephrosis. There are bilateral nonobstructing renal stones the largest on the left measuring 10 mm.    There are no thick-walled or dilated bowel loops. There is no mesenteric or retroperitoneal adenopathy.    There is a stable 4.4 x 4.5 cm infrarenal saccular abdominal aortic aneurysm. There is diffuse vascular calcification. The iliac arteries are normal in caliber.    There are degenerative changes of the spine. There is a right hip prosthesis with hardware in the left femoral neck.    CT PELVIS: There is a stable 6 mm stone in the right side of bladder. The uterus is absent. There is no free fluid.    IMPRESSION: No acute abdominal or pelvic process    Stable bilateral nonobstructing renal stones and 6 mm bladder  stone    Stable 4.4 x 4.5 cm infrarenal abdominal aortic aneurysm    Colonic diverticulosis    Small left pleural effusion    Electronically signed by:  Jonna Lyn MD  3/21/2022 3:47 PM CDT Workstation: [id number]                             CT Head Without Contrast (Final result)  Result time 03/21/22 15:43:34    Final result by Jonna Lyn MD (03/21/22 15:43:34)                 Narrative:    CMS MANDATED QUALITY DATA - CT RADIATION  436    All CT scans at this facility utilize dose modulation, iterative reconstruction, and/or weight based dosing when appropriate to reduce radiation dose to as low as reasonably achievable.  CT head without contrast    Clinical data: Syncope.    COMPARISON: 7/4/2021    FINDINGS: Noninfusion images were obtained from the skull base to the vertex. There is no intracranial mass, hemorrhage, or midline shift. Ventricles and sulci are mildly prominent. There is stable encephalomalacia in the left cerebellar hemisphere. There are no pathologic extra-axial fluid collections. There is no evidence of cortical ischemic change. Changes of mild chronic microvascular white matter disease are noted. Cerebellum and brainstem are normal. The calvarium is intact.    IMPRESSION:    1. No acute intracranial abnormalities.    Generalized cerebral atrophy with periventricular small vessel disease and stable encephalomalacia in left cerebellar hemisphere    Electronically signed by:  Jonna Lyn MD  3/21/2022 3:43 PM CDT Workstation: [id number]                             X-Ray Chest AP Portable (Final result)  Result time 03/21/22 14:57:11    Final result by Jorje Joyce MD (03/21/22 14:57:11)                 Narrative:    Reason: CHF    FINDINGS:    PA and lateral chest with comparison chest x-ray January 13, 2022 show unchanged borderline enlarged cardiac mediastinal silhouette. Left Loop recorder device again noted  There is prominence of the perihilar interstitium. Right  infrahilar hazy ill-defined opacification noted.. Pulmonary vasculature is normal. No acute osseous abnormality.    IMPRESSION:    1.  Prominence of the perihilar interstitial and right infrahilar hazy ill-defined opacification could reflect pulmonary edema or other infiltrate.  2.  Unchanged borderline enlarged cardiomediastinal silhouette.    Electronically signed by:  Jorje Joyce DO  3/21/2022 2:57 PM CDT Workstation: [id number]                               Medications   sodium chloride 0.9% flush 2 mL (has no administration in time range)   albuterol inhaler 2 puff (has no administration in time range)   allopurinoL tablet 100 mg (has no administration in time range)   ascorbic acid (vitamin C) tablet 500 mg (has no administration in time range)   calcium carbonate 200 mg calcium (500 mg) chewable tablet 500 mg (has no administration in time range)   diltiaZEM 24 hr capsule 180 mg (has no administration in time range)   ferrous sulfate tablet 1 each (has no administration in time range)   fluticasone propionate 50 mcg/actuation nasal spray 100 mcg (has no administration in time range)   cloNIDine tablet 0.1 mg (0.1 mg Oral Given 3/21/22 1608)   labetaloL injection 10 mg (10 mg Intravenous Given 3/21/22 2047)     Medical Decision Making:   ED Management:  Patient received clonidine for blood pressure control she is allergic to hydralazine after improvement in her blood pressure headache did resolve CT scan of the head is unremarkable laboratory evaluation reviewed family also reports that her urine was foul smelling and cloudy is they were concerned she may have another urinary tract infection however the urinalysis showed some white blood cells and no evidence of bacteria will await cultures I discussed patient's findings with the hospitalist who will evaluate patient in the ER for admission                      Clinical Impression:   Final diagnoses:  [R55] Syncope  [I10] Hypertension, unspecified type  (Primary)          ED Disposition Condition    Admit               Nehemiah Cueva MD  03/21/22 2110

## 2022-03-22 NOTE — CARE UPDATE
03/22/22 0751   Patient Assessment/Suction   Level of Consciousness (AVPU) alert   Respiratory Effort Unlabored   Expansion/Accessory Muscles/Retractions expansion symmetric   All Lung Fields Breath Sounds wheezes, expiratory;equal bilaterally   PRE-TX-O2   O2 Device (Oxygen Therapy) nasal cannula   $ Is the patient on Low Flow Oxygen? Yes   Flow (L/min) 2   SpO2 96 %   Pulse Oximetry Type Continuous   $ Pulse Oximetry - Multiple Charge Pulse Oximetry - Multiple   Pulse 61   Resp 17   Aerosol Therapy   $ Aerosol Therapy Charges Aerosol Treatment   Daily Review of Necessity (SVN) completed   Respiratory Treatment Status (SVN) given   Treatment Route (SVN) mask;oxygen   Patient Position (SVN) Shetty's   Post Treatment Assessment (SVN) increased aeration   Signs of Intolerance (SVN) none   Education   $ Education Bronchodilator;15 min

## 2022-03-22 NOTE — PLAN OF CARE
Critical access hospital  Initial Discharge Assessment       Primary Care Provider: Eli Edmonds MD    Admission Diagnosis: Hypertension, unspecified type [I10]    Admission Date: 3/21/2022  Expected Discharge Date:     Discharge Barriers Identified: (P) None    Payor: [employer] / Plan: [employer] 65 / Product Type: Medicare Advantage /     Extended Emergency Contact Information  Primary Emergency Contact: Annabella Guerra  Address: [address]           [place] [address] [place]  Home Phone: [phone]  Mobile Phone: [phone]  Relation: Daughter  Preferred language: English   needed? No  Secondary Emergency Contact: Toya Hendrickson  Address: [address] [place] [address] [facility] [place] [facility]  Home Phone: [phone]  Mobile Phone: [phone]  Relation: Daughter  Preferred language: English   needed? No    Discharge Plan A: (P) Home with family  Discharge Plan B: (P) Home with family      [facility] - [place] - [address]  [address]  [place] [address]  Phone: [phone] Fax: [phone]    [facility]  [address]  [place] [address]  Phone: [phone] Fax: [phone]      Initial Assessment (most recent)     Adult Discharge Assessment - 03/22/22 1530        Discharge Assessment    Assessment Type Discharge Planning Assessment     Confirmed/corrected address, phone number and insurance Yes     Confirmed Demographics Correct on Facesheet     Source of Information patient   Assessment completed at bedside    Communicated ELYSSA with patient/caregiver Yes     Reason For Admission Vasovagal syncope     Lives With child(shreyas), adult     Facility Arrived From: Home, currently staying with daughter Annabella     Do you expect to return to your current living situation? Yes     Do you have help at home or someone to help you manage your care at home? Yes      Who are your caregiver(s) and their phone number(s)? Annabella Guerra (Daughter)   [phone]     Prior to hospitilization cognitive status: Alert/Oriented     Current cognitive status: Alert/Oriented     Walking or Climbing Stairs Difficulty ambulation difficulty, requires equipment     Mobility Management Rolling walker     Dressing/Bathing Difficulty none     Equipment Currently Used at Home walker, rolling;bedside commode;nebulizer;oxygen;shower chair     Readmission within 30 days? No     Patient currently being followed by outpatient case management? No     Do you currently have service(s) that help you manage your care at home? No     Do you take prescription medications? Yes     Do you have prescription coverage? Yes     Coverage PHN     Do you have any problems affording any of your prescribed medications? No     Is the patient taking medications as prescribed? yes     Who is going to help you get home at discharge? Annabella Guerra (Daughter)   [phone]     How do you get to doctors appointments? family or friend will provide     Are you on dialysis? No     Do you take coumadin? No     Discharge Plan A Home with family     Discharge Plan B Home with family     DME Needed Upon Discharge  none     Discharge Plan discussed with: Patient     Discharge Barriers Identified None        Relationship/Environment    Name(s) of Who Lives With Patient Annabella Guerra (Daughter)   [phone]

## 2022-03-22 NOTE — CONSULTS
[facility]  Department of Cardiology  Consult Note      PATIENT NAME: Blaire Cage  MRN: [id number]  TODAY'S DATE: 03/22/2022  ADMIT DATE: 3/21/2022                          CONSULT REQUESTED BY: Julian Joiner MD    SUBJECTIVE     PRINCIPAL PROBLEM: <principal problem not specified>      REASON FOR CONSULT:  HTN Urgency      Per Daughter-  Syncope after bowel movement and diaphoretic- vomited x 1 as well.   Blood sugar was in the 200s  3 Days of loose stool - Imodium was given   History of Vagal syncope        HPI:    89-year-old female patient known to us.  Patient tells me she just has felt really badly over the past 2 days she started having loose bowel movements about 3 days ago.  For the past several months patient's blood pressure has been labile and we have been adjusting medicines accordingly.  Latest blood pressure regimen has been as follows she has been taking losartan 25 mg p.o. b.i.d. and taking Cardizem 180 mg every night.  Despite this blood pressure has been high according to her daughter in the 150s.  Yesterday she was getting ready and had to have a bowel movement and vomited after that shortly after she passed out daughter called 911 and patient was brought to the ER for evaluation.  Upon arrival patient's blood pressure was 200 systolic.  According to patient's daughter she was given Imodium yesterday during morning as patient complained of abdominal pain and loose stools.  Currently patient is feeling much better.  She is off Cardene drip.  Currently heart rates in the 50s blood pressure is 150s.    FROM H AND P     Blaire Cage is a 89 y.o. Black or  female with known history of   CKD stage 3 heart failure ejection fraction 55% hypertension COPD anemia diabetes DVT P used to be on Xarelto but was taken off with by physician according to family hypothyroidism COVID-19 in September 2021 chronic respiratory failure on 2 L nasal cannula gastric antral  vascular ectasia, AAA being monitored as outpatient,  course in the ER today because she had had episode of syncope while in the restroom this morning Ho daughter weakness it it the time because she 1st call her said that she was about to faint and the nurses that she loss consciousness for about 10 minutes she recently has been taking Lasix daily due to episodes of leg swelling she has prescription for p.r.n. she has been taking daily for the past week she also has been having episodes of abdominal pain for the past 2 days with diarrhea for 5 times a day and low p.o. intake she had 1 episode of vomiting today none the previous days the abdominal pain has some radiation to the left lower extremity she denies chest pain shortness are breath.  Daughter says that over pressure usually has been very difficult to control her cardiologist Dr. De La Fuente had recently changed amlodipine to diltiazem in and also starting home losartan in recently she had to had hold losartan dose increased however daughter states this to the pressure was not well controlled when she arrived to the ER initially SBP was around 200s she got 1 dose of from:  In the by ER and blood pressure improved for about 20 minutes and then laid on was again with a SBP around the 200s so we give a dose of labetalol IV 10 mg and she again had improvement of SBP to around 160s for about 30 minutes so we will start on a nicardipine drip and admit patient to cardiology unit.         Review of patient's allergies indicates:   Allergen Reactions    Carvedilol Other (See Comments)     Bradycardia and syncope    Boniva [ibandronate]     Codeine     Hydralazine analogues     Iodinated contrast media Hives    Kionex [sodium polystyrene sulfonate] Diarrhea     From encounter 12/11/17 for diarrhea--not true allergy.    Lisinopril     Morphine        Past Medical History:   Diagnosis Date    Anemia due to multiple mechanisms 6/29/2018    Anemia, chronic disease  6/29/2018    Anemia, chronic renal failure, stage 3 (moderate) 6/29/2018    Anticoagulant long-term use     aspirin    Aortic aneurysm     Chest pain     CHF (congestive heart failure)     COPD (chronic obstructive pulmonary disease)     COPD with acute exacerbation 1/9/2015    Depression     Diabetes mellitus type II     no longer on any DM meds    DVT (deep venous thrombosis) 06/09/2018    Encounter for blood transfusion     GERD (gastroesophageal reflux disease)     GI bleed     Gout     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 8/7/2018    Hip arthritis 3/1/2016    Homocysteinemia 8/7/2018    Hyperlipidemia     Hypertension     Incontinent of urine     Normocytic normochromic anemia 6/29/2018    Oxygen dependent     use oxygen as needed    PE (pulmonary thromboembolism) 06/09/2018    Pneumonia of right lower lobe due to infectious organism 9/11/2017    Syncope     Thyroid disease     hypothyroid    Type 2 diabetes mellitus with stage 3 chronic kidney disease 1/18/2013    UTI (urinary tract infection)      Past Surgical History:   Procedure Laterality Date    APPENDECTOMY      CHOLECYSTECTOMY      COLONOSCOPY Left 10/29/2020    Procedure: COLONOSCOPY;  Surgeon: Singh Kee III, MD;  Location: [facility];  Service: Endoscopy;  Laterality: Left;    ESOPHAGOGASTRODUODENOSCOPY Left 10/26/2020    Procedure: EGD (ESOPHAGOGASTRODUODENOSCOPY);  Surgeon: Kareem Gramajo MD;  Location: [facility] ENDO;  Service: Endoscopy;  Laterality: Left;    ESOPHAGOGASTRODUODENOSCOPY Left 10/28/2020    Procedure: EGD (ESOPHAGOGASTRODUODENOSCOPY);  Surgeon: Kareem Gramajo MD;  Location: [facility];  Service: Endoscopy;  Laterality: Left;    ESOPHAGOGASTRODUODENOSCOPY N/A 9/16/2021    Procedure: EGD (ESOPHAGOGASTRODUODENOSCOPY);  Surgeon: Kareem Gramajo MD;  Location: [facility];  Service: Endoscopy;  Laterality: N/A;    FRACTURE SURGERY      right hip     HERNIA REPAIR      groin    HYSTERECTOMY       INSERTION OF IMPLANTABLE LOOP RECORDER N/A 12/12/2018    Procedure: Insertion, Implantable Loop Recorder;  Surgeon: Ashok Herbert MD;  Location: [facility] CATH LAB;  Service: Cardiology;  Laterality: N/A;    PERCUTANEOUS PINNING OF HIP Left 3/23/2019    Procedure: PINNING, HIP, PERCUTANEOUS;  Surgeon: Jorje Hamlin MD;  Location: [facility] OR;  Service: Orthopedics;  Laterality: Left;    SMALL BOWEL ENTEROSCOPY N/A 10/29/2020    Procedure: ENTEROSCOPY;  Surgeon: Singh Kee III, MD;  Location: [facility] ENDO;  Service: Endoscopy;  Laterality: N/A;    VARICOSE VEIN SURGERY       Social History     Tobacco Use    Smoking status: Former Smoker     Packs/day: 0.35     Years: 44.00     Pack years: 15.40     Types: Cigarettes     Start date: 1970    Smokeless tobacco: Never Used    Tobacco comment: 1/08/2015   Substance Use Topics    Alcohol use: No     Alcohol/week: 0.0 standard drinks    Drug use: No        REVIEW OF SYSTEMS      JUST FELT BADLY  ABDOMINAL PAIN  HEADACHE  LOOSE STOOL 2 DAYS AGO    OBJECTIVE     VITAL SIGNS (Most Recent)  Temp: 97.9 °F (36.6 °C) (03/22/22 0719)  Pulse: 61 (03/22/22 0751)  Resp: 17 (03/22/22 0751)  BP: 136/63 (03/22/22 0719)  SpO2: 96 % (03/22/22 0751)    VENTILATION STATUS  Resp: 17 (03/22/22 0751)  SpO2: 96 % (03/22/22 0751)       I & O (Last 24H):No intake or output data in the 24 hours ending 03/22/22 0847    WEIGHTS  Wt Readings from Last 3 Encounters:   03/22/22 0035 59.1 kg (130 lb 4.8 oz)   03/21/22 1350 56.2 kg (124 lb)   03/15/22 1356 56.2 kg (124 lb)   03/08/22 1320 58 kg (127 lb 15.6 oz)       PHYSICAL EXAM  GENERAL:  Elderly female in no acute distress  HEENT: Normocephalic. Pupils normal and conjunctivae normal.  Mucous membranes normal, no cyanosis or icterus, trachea central,no pallor or icterus is noted..   NECK: No JVD. No bruit..   THYROID: Thyroid not enlarged. No nodules present..   CARDIAC: Regular rate and rhythm. S1 is normal.  Systolic murmur  present  CHEST ANATOMY: normal.   LUNGS:  Mild wheezing present  ABDOMEN: Soft no masses or organomegaly.  No abdomen pulsations or bruits.  Normal bowel sounds. No pulsations and no masses felt, No guarding or rebound.   URINARY: No carter catheter   EXTREMITIES: No cyanosis, clubbing or edema noted at this time., no calf tenderness bilaterally.   PERIPHERAL VASCULAR SYSTEM: Good palpable distal pulses.   CENTRAL NERVOUS SYSTEM: No focal motor or sensory deficits noted.   SKIN: Skin without lesions, moist, well perfused.   MUSCLE STRENGTH & TONE: No noteable weakness, atrophy or abnormal movement.     HOME MEDICATIONS:  No current facility-administered medications on file prior to encounter.     Current Outpatient Medications on File Prior to Encounter   Medication Sig Dispense Refill    allopurinoL (ZYLOPRIM) 100 MG tablet Take 100 mg by mouth once daily.      ascorbic acid, vitamin C, (VITAMIN C) 500 MG tablet Take 500 mg by mouth once daily.      calcium carbonate (OS-TASIA) 500 mg calcium (1,250 mg) tablet Take 1 tablet by mouth 2 (two) times daily.      diclofenac sodium (VOLTAREN) 1 % Gel Apply 2 g topically once daily. (Patient taking differently: Apply 2 g topically as needed.) 100 g prn    diltiaZEM (CARDIZEM CD) 180 MG 24 hr capsule Take 1 capsule (180 mg total) by mouth once daily. 90 capsule 3    ferrous sulfate (FEOSOL) 325 mg (65 mg iron) Tab tablet Take 325 mg by mouth once daily.      fish oil-omega-3 fatty acids 300-1,000 mg capsule Take 2 g by mouth every evening.      fluticasone (FLONASE) 50 mcg/actuation nasal spray 2 sprays by Each Nare route once daily. (Patient taking differently: 2 sprays by Each Nostril route as needed.) 48 g 3    fluticasone-umeclidin-vilanter (TRELEGY ELLIPTA) 200-62.5-25 mcg inhaler Inhale 1 puff into the lungs once daily. 60 each 11    folic acid-vit B6-vit B12 2.5-25-2 mg (FOLBIC) 2.5-25-2 mg Tab Take 1 tablet by mouth once daily.      furosemide (LASIX) 20 MG  tablet Take 1 tablet only as needed, for swelling, increase SOB, weight gain of 3 lbs overnight or 5 lbs for the week (Patient taking differently: Take 20 mg by mouth as needed. Take 1 tablet only as needed, for swelling, increase SOB, weight gain of 3 lbs overnight or 5 lbs for the week) 90 tablet 3    ketoconazole (NIZORAL) 2 % shampoo Apply topically twice a week. (Patient taking differently: Apply 1 application topically twice a week.) 120 mL 2    levothyroxine (SYNTHROID) 88 MCG tablet Take 1 tablet by mouth once daily (Patient taking differently: Take 88 mcg by mouth before breakfast.) 90 tablet 3    losartan (COZAAR) 25 MG tablet Take 1 tablet (25 mg total) by mouth 2 (two) times a day. (Patient taking differently: Take 25 mg by mouth once daily.) 180 tablet 3    magnesium oxide (MAG-OX) 400 mg (241.3 mg magnesium) tablet Take 1 tablet by mouth once daily (Patient taking differently: Take 400 mg by mouth once daily.) 90 tablet 3    montelukast (SINGULAIR) 10 mg tablet Take 1 tablet (10 mg total) by mouth every evening. 90 tablet 3    multivitamin (THERAGRAN) tablet Take 1 tablet by mouth once daily.      omeprazole (PRILOSEC) 40 MG capsule Take 1 capsule by mouth once daily (Patient taking differently: Take 40 mg by mouth once daily.) 90 capsule 3    polyethylene glycol (GLYCOLAX) 17 gram/dose powder Take 17 g by mouth 2 (two) times a day.      rosuvastatin (CRESTOR) 20 MG tablet Take 1 tablet (20 mg total) by mouth once daily. 90 tablet 3    sertraline (ZOLOFT) 25 MG tablet Take 1 tablet (25 mg total) by mouth once daily. 90 tablet 3    tamsulosin (FLOMAX) 0.4 mg Cap Take 1 capsule (0.4 mg total) by mouth once daily. 30 capsule 0    vitamin D 1000 units Tab Take 1 tablet (1,000 Units total) by mouth once daily. 90 tablet 3    albuterol (PROVENTIL) 2.5 mg /3 mL (0.083 %) nebulizer solution USE 1 VIAL IN NEBULIZER EVERY 6 HOURS AS NEEDED FOR WHEEZING. RESCUE (Patient taking differently: Take 2.5  mg by nebulization every 6 (six) hours as needed for Wheezing or Shortness of Breath. USE 1 VIAL IN NEBULIZER EVERY 6 HOURS AS NEEDED FOR WHEEZING. RESCUE) 300 mL 3    meclizine (ANTIVERT) 25 mg tablet Take 1 tablet (25 mg total) by mouth 3 (three) times daily as needed. 20 tablet prn    nitroGLYCERIN (NITROSTAT) 0.4 MG SL tablet Place 1 tablet (0.4 mg total) under the tongue every 5 (five) minutes as needed for Chest pain. As needed (Patient taking differently: Place 0.4 mg under the tongue every 5 (five) minutes as needed for Chest pain. Seek medical help if pain is not relieved after the third dose.) 30 tablet 3       SCHEDULED MEDS:   albuterol sulfate  2.5 mg Nebulization Q6H WAKE    allopurinoL  100 mg Oral Daily    ascorbic acid (vitamin C)  500 mg Oral Daily    atorvastatin  80 mg Oral Daily    calcium carbonate  500 mg Oral BID    diltiaZEM  180 mg Oral Daily    ferrous sulfate  1 tablet Oral Daily    fluticasone furoate-vilanteroL  1 puff Inhalation Daily    fluticasone propionate  2 spray Each Nostril Daily    furosemide (LASIX) injection  40 mg Intravenous Once    heparin (porcine)  5,000 Units Subcutaneous Q12H    levothyroxine  88 mcg Oral Before breakfast    montelukast  10 mg Oral Daily    pantoprazole  40 mg Oral Before breakfast    potassium bicarbonate  10 mEq Oral Once    sertraline  25 mg Oral Daily    tamsulosin  0.4 mg Oral Daily    vitamin D  1,000 Units Oral Daily       CONTINUOUS INFUSIONS:   nicardipine Stopped (03/22/22 0436)       PRN MEDS:meclizine, sodium chloride 0.9%    LABS AND DIAGNOSTICS     CBC LAST 3 DAYS  Recent Labs   Lab 03/21/22  1400 03/22/22  0627   WBC 6.25 5.99   RBC 3.11* 2.79*   HGB 9.8* 8.8*   HCT 31.7* 28.2*   * 101*   MCH 31.5* 31.5*   MCHC 30.9* 31.2*   RDW 15.3* 14.9*   * 160   MPV 11.3 11.6   GRAN 62.1  3.9 56.9  3.4   LYMPH 23.4  1.5 25.9  1.6   MONO 8.8  0.6 9.7  0.6   BASO 0.04 0.04   NRBC 0 0       COAGULATION LAST 3  DAYS  No results for input(s): LABPT, INR, APTT in the last 168 hours.    CHEMISTRY LAST 3 DAYS  Recent Labs   Lab 03/21/22  1400 03/22/22  0627    140   K 3.8 3.7    109   CO2 25 25   ANIONGAP 8 6*   BUN 34* 34*   CREATININE 1.6* 1.9*   * 110   CALCIUM 8.1* 7.8*   MG 1.8  --    ALBUMIN 2.9*  --    PROT 5.3*  --    ALKPHOS 90  --    ALT 51*  --    AST 61*  --    BILITOT 0.5  --        CARDIAC PROFILE LAST 3 DAYS  Recent Labs   Lab 03/21/22  1400   *   TROPONINI <0.030       ENDOCRINE LAST 3 DAYS  No results for input(s): TSH, PROCAL in the last 168 hours.    LAST ARTERIAL BLOOD GAS  ABG  No results for input(s): PH, PO2, PCO2, HCO3, BE in the last 168 hours.    LAST 7 DAYS MICROBIOLOGY   Microbiology Results (last 7 days)     ** No results found for the last 168 hours. **          MOST RECENT IMAGING  US Carotid Bilateral  EXAM DESCRIPTION:  US CAROTID BILATERAL    CLINICAL HISTORY:  89 years  Female  syncope    COMPARISON:  None    TECHNIQUE:  Two-dimensional and color flow images through the carotid artery systems were obtained. Peak flow  velocity measurements were recorded. Spectral analysis was performed. Peak flow velocity ratios were calculated.    FINDINGS:  Right carotid: Calcification in the right carotid bulb. Peak flow velocities in the right common carotid artery, right internal carotid artery and right external carotid artery are within normal limits. The ratio of the peak flow velocity of the right internal carotid artery to the right common carotid artery is normal at 1.2. There is calcification and plaque at the right common carotid  artery bifurcation. This results in less than 50% stenosis.  The waveforms appear normal.    Left carotid: Elevated peak systolic velocities in the left common carotid artery and the proximal and mid left internal carotid artery. The ratio of the peak flow velocity of the left internal carotid artery to the left common carotid artery is normal  at 0.9. There is calcification and plaque at the left common carotid artery bifurcation. There is spectral broadening throughout the left.    Vertebral arteries: Normal anterograde flow is seen in both vertebral arteries.    IMPRESSION:  Left common and internal carotid artery stenosis, 50-69%.    Electronically signed by:  Jorje Westbrook MD  3/21/2022 10:04 PM CDT Workstation: [id number]  CT Abdomen Pelvis  Without Contrast  CMS MANDATED QUALITY DATA - CT RADIATION - 436    All CT scans at this facility utilize dose modulation, iterative reconstruction, and/or weight based dosing when appropriate to reduce radiation dose to as low as reasonably achievable.    HISTORY: Abdominal pain    FINDINGS: Noncontrast axial images were obtained. Nonenhanced study is tailored for the detection of urolithiasis, and is insensitive for abnormalities of the solid organs, vasculature and hollow viscera.    CT ABDOMEN: The heart is enlarged. There is a small left pleural effusion. There is atelectasis in the right lung base.    The liver, spleen and pancreas have a normal noncontrast appearance. The gallbladder is absent.  The adrenal glands are normal.    The kidneys are symmetric in size without hydronephrosis. There are bilateral nonobstructing renal stones the largest on the left measuring 10 mm.    There are no thick-walled or dilated bowel loops. There is no mesenteric or retroperitoneal adenopathy.    There is a stable 4.4 x 4.5 cm infrarenal saccular abdominal aortic aneurysm. There is diffuse vascular calcification. The iliac arteries are normal in caliber.    There are degenerative changes of the spine. There is a right hip prosthesis with hardware in the left femoral neck.    CT PELVIS: There is a stable 6 mm stone in the right side of bladder. The uterus is absent. There is no free fluid.    IMPRESSION: No acute abdominal or pelvic process    Stable bilateral nonobstructing renal stones and 6 mm bladder  stone    Stable 4.4 x 4.5 cm infrarenal abdominal aortic aneurysm    Colonic diverticulosis    Small left pleural effusion    Electronically signed by:  Jonna Lyn MD  3/21/2022 3:47 PM CDT Workstation: [id number]  CT Head Without Contrast  CMS MANDATED QUALITY DATA - CT RADIATION  436    All CT scans at this facility utilize dose modulation, iterative reconstruction, and/or weight based dosing when appropriate to reduce radiation dose to as low as reasonably achievable.  CT head without contrast    Clinical data: Syncope.    COMPARISON: 7/4/2021    FINDINGS: Noninfusion images were obtained from the skull base to the vertex. There is no intracranial mass, hemorrhage, or midline shift. Ventricles and sulci are mildly prominent. There is stable encephalomalacia in the left cerebellar hemisphere. There are no pathologic extra-axial fluid collections. There is no evidence of cortical ischemic change. Changes of mild chronic microvascular white matter disease are noted. Cerebellum and brainstem are normal. The calvarium is intact.    IMPRESSION:    1. No acute intracranial abnormalities.    Generalized cerebral atrophy with periventricular small vessel disease and stable encephalomalacia in left cerebellar hemisphere    Electronically signed by:  Jonna Lyn MD  3/21/2022 3:43 PM CDT Workstation: [id number]  X-Ray Chest AP Portable  Reason: CHF    FINDINGS:    PA and lateral chest with comparison chest x-ray January 13, 2022 show unchanged borderline enlarged cardiac mediastinal silhouette. Left Loop recorder device again noted  There is prominence of the perihilar interstitium. Right infrahilar hazy ill-defined opacification noted.. Pulmonary vasculature is normal. No acute osseous abnormality.    IMPRESSION:    1.  Prominence of the perihilar interstitial and right infrahilar hazy ill-defined opacification could reflect pulmonary edema or other infiltrate.  2.  Unchanged borderline enlarged  cardiomediastinal silhouette.    Electronically signed by:  Jorje Joyce DO  3/21/2022 2:57 PM CDT Workstation: [id number]      ECHOCARDIOGRAM RESULTS (last 5)  Results for orders placed during the hospital encounter of 10/19/21    Echo    Interpretation Summary  · The estimated PA systolic pressure is 44 mmHg.  · The left ventricle is normal in size with severe concentric hypertrophy and normal systolic function.  · The estimated ejection fraction is 60%.  · Grade II left ventricular diastolic dysfunction.  · Mild right ventricular enlargement.  · Mild left atrial enlargement.  · Mild to moderate tricuspid regurgitation.  · Mild-to-moderate mitral regurgitation.  · Normal central venous pressure (3 mmHg).      Results for orders placed during the hospital encounter of 05/07/20    Echo Color Flow Doppler? Yes; Bubble Contrast? Yes    Interpretation Summary  · Concentric left ventricular hypertrophy.  · The mean diastolic gradient across the mitral valve is 5 mmHg at a heart rate of 57 bpm.  · Normal left ventricular systolic function. The estimated ejection fraction is 65%.  · Grade II (moderate) left ventricular diastolic dysfunction consistent with pseudonormalization.  · Normal right ventricular systolic function.  · Severe left atrial enlargement.  · Mild right atrial enlargement.  · Mild mitral regurgitation.  · Mild to moderate tricuspid regurgitation.  · Normal central venous pressure (3 mmHg).  · The estimated PA systolic pressure is 50 mmHg.  · Pulmonary hypertension present.  · No PFO on color flow or saline bubble study      CURRENT/PREVIOUS VISIT EKG  Results for orders placed or performed during the hospital encounter of 03/21/22   EKG and show to ED MD    Collection Time: 03/21/22  1:55 PM    Narrative    Test Reason : R55,    Vent. Rate : 071 BPM     Atrial Rate : 071 BPM     P-R Int : 174 ms          QRS Dur : 082 ms      QT Int : 430 ms       P-R-T Axes : 016 018 056 degrees     QTc Int : 467  ms    Normal sinus rhythm  Septal infarct (cited on or before 04-JUL-2021)  Abnormal ECG  When compared with ECG of 13-JAN-2022 14:15,  Nonspecific T wave abnormality no longer evident in Anterior leads    Referred By: [email or web address]   SELF           Confirmed By:            ASSESSMENT/PLAN:     Active Hospital Problems    Diagnosis    Chronic respiratory failure       ASSESSMENT & PLAN:     1. Hypertensive emergency-improved  2. Syncope-loop recorder in Situ this was after bowel movement and vomiting consider vagal?  3. Stable 4.4 x 4.5 cm infrarenal abdominal aortic aneurysm  4. Diverticulosis  5. Labile blood pressure  6. Acute kidney injury probably secondary to IV Lasix  7. Thyroid dysfunction  8. Dyslipidemia  9. Diabetes  10. Diastolic dysfunction  11. Pulmonary hypertension       RECOMMENDATIONS:      Check orthostatics  Will review if anything was seen on loop recorder yesterday seems like this could have been vasovagal in nature    May need a push p.o. fluids today as she has acute kidney injury  Monitor on telemetry today and monitor blood pressures today  Will follow  Thank you for the consultation          Sadie Perez NP  [facility]  Department of Cardiology  Date of Service: 03/22/2022      Consider cautious hydration.  I have personally interviewed and examined the patient, I have reviewed the Nurse Practitioner's history and physical, assessment, and plan. I agree with the findings and plan.      Tylor De La Fuente M.D.  [facility]  Department of Cardiology  Date of Service: 03/22/2022  8:47 AM

## 2022-03-22 NOTE — CONSULTS
Consult noted for DME referral; however, there is no DME ordered.  Spoke with patient who states she has no equipment needs at this time.  Will continue to follow for discharge planning.

## 2022-03-22 NOTE — PROGRESS NOTES
[facility]  Progress Note    Patient name: Blaire Cage  MRN: [id number]  Admit Date: 3/21/2022   LOS: 1 day     SUBJECTIVE:     Principal problem: Vasovagal syncope    Interval History:  Admitted for hypertensive urgency and was on nicardipine drip which has been weaned off.  No further episodes of syncope since admission.  Patient denies chest pain or shortness of breath.  No lower extremity edema.    Scheduled Meds:   albuterol sulfate  2.5 mg Nebulization Q6H WAKE    [START ON 3/24/2022] allopurinoL  50 mg Oral Every other day    ascorbic acid (vitamin C)  500 mg Oral Daily    atorvastatin  80 mg Oral Daily    calcium carbonate  500 mg Oral BID    diltiaZEM  180 mg Oral Daily    ferrous sulfate  1 tablet Oral Daily    fluticasone furoate-vilanteroL  1 puff Inhalation Daily    fluticasone propionate  2 spray Each Nostril Daily    heparin (porcine)  5,000 Units Subcutaneous Q12H    levothyroxine  88 mcg Oral Before breakfast    montelukast  10 mg Oral Daily    pantoprazole  40 mg Oral Before breakfast    sertraline  25 mg Oral Daily    tamsulosin  0.4 mg Oral Daily    vitamin D  1,000 Units Oral Daily     Continuous Infusions:   nicardipine Stopped (03/22/22 0436)     PRN Meds:calcium chloride IVPB, calcium chloride IVPB, calcium chloride IVPB, calcium chloride IVPB, magnesium oxide, magnesium sulfate IVPB, magnesium sulfate IVPB, magnesium sulfate IVPB, magnesium sulfate IVPB, meclizine, potassium chloride, potassium chloride, potassium chloride, potassium chloride, sodium chloride 0.9%, traMADoL    Review of patient's allergies indicates:   Allergen Reactions    Carvedilol Other (See Comments)     Bradycardia and syncope    Boniva [ibandronate]     Codeine     Hydralazine analogues     Iodinated contrast media Hives    Kionex [sodium polystyrene sulfonate] Diarrhea     From encounter 12/11/17 for diarrhea--not true allergy.    Lisinopril     Morphine         Review of Systems: As per interval history    OBJECTIVE:     Vital Signs (Most Recent)  Temp: 98 °F (36.7 °C) (03/22/22 1122)  Pulse: (!) 58 (03/22/22 1313)  Resp: 15 (03/22/22 1313)  BP: 104/65 (03/22/22 1200)  SpO2: 98 % (03/22/22 1313)    Vital Signs Range (Last 24H):  Temp:  [97.9 °F (36.6 °C)-98.5 °F (36.9 °C)]   Pulse:  [58-68]   Resp:  [15-28]   BP: (104-205)/(57-96)   SpO2:  [83 %-99 %]     I & O (Last 24H):    Intake/Output Summary (Last 24 hours) at 3/22/2022 1448  Last data filed at 3/22/2022 0830  Gross per 24 hour   Intake 200 ml   Output --   Net 200 ml       Physical Exam:  General: Patient resting comfortably in no acute distress. Appears as stated age. Calm  Eyes: No conjunctival injection. No scleral icterus.  ENT: Hearing grossly intact. No discharge from ears. No nasal discharge.   CVS: RRR. No LE edema BL  Lungs:  No tachypnea or accessory muscle use.    Abdomen:  Soft, nontender and nondistended.  No organomegaly  Neuro: AOx3. Moves all extremities. Follows commands. Responds appropriately     Laboratory:  I have reviewed all pertinent lab results within the past 24 hours.  CBC:   Recent Labs   Lab 03/22/22  0627   WBC 5.99   RBC 2.79*   HGB 8.8*   HCT 28.2*      *   MCH 31.5*   MCHC 31.2*     CMP:   Recent Labs   Lab 03/21/22  1400 03/22/22  0627   * 110   CALCIUM 8.1* 7.8*   ALBUMIN 2.9*  --    PROT 5.3*  --     140   K 3.8 3.7   CO2 25 25    109   BUN 34* 34*   CREATININE 1.6* 1.9*   ALKPHOS 90  --    ALT 51*  --    AST 61*  --    BILITOT 0.5  --      Cardiac markers:   Recent Labs   Lab 03/21/22  1400   TROPONINI <0.030       Diagnostic Results:  Labs: Reviewed  X-Ray: Reviewed    ASSESSMENT/PLAN:     Active Hospital Problems    Diagnosis  POA    *Vasovagal syncope [R55]  Yes    Hypertensive urgency [I16.0]  Yes    Chronic diastolic heart failure [I50.32]  Yes    Chronic respiratory failure with hypoxia [J96.11]  Yes    Chronic anemia [D64.9]  Yes     Diabetic neuropathy, type II diabetes mellitus [E11.40]  Yes    Type 2 diabetes mellitus with stage 4 chronic kidney disease [E11.22, N18.4]  Yes      Resolved Hospital Problems   No resolved problems to display.     Plan:   Syncope appears to be vasovagal based on presentation.  Carotid ultrasound with no hemodynamically significant stenosis  HTN urgency - Off nicardipine drip  Appreciate cardiology input  Continue home diltiazem.  Monitor blood pressure closely  One dose of IV furosemide ordered this morning for volume overload  CKD stage 4 noted.  Monitor renal function closely  Stable 4.4 x 4.5 cm abdominal aortic aneurysm       VTE Risk Mitigation (From admission, onward)         Ordered     heparin (porcine) injection 5,000 Units  Every 12 hours         03/21/22 2147     IP VTE HIGH RISK PATIENT  Once         03/21/22 2052     Place sequential compression device  Until discontinued         03/21/22 2052                    Department Hospital Medicine  [facility]  Julian Joiner MD  Date of service: 03/22/2022        Please note: This note was transcribed using voice recognition software. Because of this technology, there are often uinintended grammatical, spelling, and other transcription errors. Please disregard these errors.

## 2022-03-23 NOTE — PROGRESS NOTES
[facility]  Progress Note    Patient name: Blaire Cage  MRN: [id number]  Admit Date: 3/21/2022   LOS: 2 days     SUBJECTIVE:     Principal problem: Vasovagal syncope    Interval History:  Last night patient was report have trouble breathing.  Endorse shortness of breath and was found to wheezing.  Symptoms improved with nebulized breathing treatment.  This morning she denies chest pain or shortness of breath.    Hospital course:  Patient is a pleasant 89-year-old female with known CKD stage 4, chronic diastolic congestive heart failure, COPD on p.r.n. oxygen, hypothyroidism, type 2 DM and essential hypertension admitted after presenting with a syncopal episode.  She was in hypertensive urgency and required nicardipine drip briefly.  Based on clinical presentation syncope appears to be vasovagal in nature.  Carotid ultrasound with no hemodynamically significant stenosis.  Being followed by Cardiology.  Hospital stay notable for SHALINI on CKD stage 3 likely from IV diuretics.     Scheduled Meds:   albuterol sulfate  2.5 mg Nebulization Q6H WAKE    [START ON 3/24/2022] allopurinoL  50 mg Oral Every other day    amLODIPine  5 mg Oral Daily    ascorbic acid (vitamin C)  500 mg Oral Daily    atorvastatin  80 mg Oral Daily    calcium carbonate  500 mg Oral BID    ferrous sulfate  1 tablet Oral Daily    fluticasone furoate-vilanteroL  1 puff Inhalation Daily    fluticasone propionate  2 spray Each Nostril Daily    heparin (porcine)  5,000 Units Subcutaneous Q12H    levothyroxine  88 mcg Oral Before breakfast    montelukast  10 mg Oral Daily    pantoprazole  40 mg Oral Before breakfast    sertraline  25 mg Oral Daily    vitamin D  1,000 Units Oral Daily     Continuous Infusions:   sodium chloride 0.45%      nicardipine Stopped (03/22/22 0436)     PRN Meds:albuterol-ipratropium, calcium chloride IVPB, calcium chloride IVPB, calcium chloride IVPB, calcium chloride IVPB,  magnesium oxide, magnesium sulfate IVPB, magnesium sulfate IVPB, magnesium sulfate IVPB, magnesium sulfate IVPB, meclizine, potassium chloride, potassium chloride, potassium chloride, potassium chloride, sodium chloride 0.9%, traMADoL    Review of patient's allergies indicates:   Allergen Reactions    Carvedilol Other (See Comments)     Bradycardia and syncope    Boniva [ibandronate]     Codeine     Hydralazine analogues     Iodinated contrast media Hives    Kionex [sodium polystyrene sulfonate] Diarrhea     From encounter 12/11/17 for diarrhea--not true allergy.    Lisinopril     Morphine        Review of Systems: As per interval history    OBJECTIVE:     Vital Signs (Most Recent)  Temp: 97.8 °F (36.6 °C) (03/23/22 0712)  Pulse: 66 (03/23/22 0800)  Resp: 17 (03/23/22 0800)  BP: (!) 176/75 (03/23/22 0712)  SpO2: 98 % (03/23/22 0800)    Vital Signs Range (Last 24H):  Temp:  [97.8 °F (36.6 °C)-98.7 °F (37.1 °C)]   Pulse:  [58-73]   Resp:  [14-28]   BP: (104-176)/(58-75)   SpO2:  [94 %-100 %]     I & O (Last 24H):    Intake/Output Summary (Last 24 hours) at 3/23/2022 1017  Last data filed at 3/23/2022 0836  Gross per 24 hour   Intake 300 ml   Output 800 ml   Net -500 ml       Physical Exam:  General: Patient resting comfortably in no acute distress. Appears as stated age. Calm.  Frail  Eyes: No conjunctival injection. No scleral icterus.  ENT: Hearing grossly intact. No discharge from ears. No nasal discharge.   CVS: RRR. No LE edema BL  Lungs:  No tachypnea or accessory muscle use.    Abdomen:  Soft, nontender and nondistended.  No organomegaly  Neuro: AOx3. Moves all extremities. Follows commands. Responds appropriately     Laboratory:  I have reviewed all pertinent lab results within the past 24 hours.  CBC:   Recent Labs   Lab 03/22/22  0627   WBC 5.99   RBC 2.79*   HGB 8.8*   HCT 28.2*      *   MCH 31.5*   MCHC 31.2*     CMP:   Recent Labs   Lab 03/21/22  1400 03/22/22  0627 03/23/22  0541    *   < > 107   CALCIUM 8.1*   < > 8.1*   ALBUMIN 2.9*  --   --    PROT 5.3*  --   --       < > 143   K 3.8   < > 3.7   CO2 25   < > 26      < > 105   BUN 34*   < > 38*   CREATININE 1.6*   < > 2.1*   ALKPHOS 90  --   --    ALT 51*  --   --    AST 61*  --   --    BILITOT 0.5  --   --     < > = values in this interval not displayed.     Cardiac markers:   Recent Labs   Lab 03/21/22  1400   TROPONINI <0.030       Diagnostic Results:  Labs: Reviewed    ASSESSMENT/PLAN:     Active Hospital Problems    Diagnosis  POA    *Vasovagal syncope [R55]  Yes    Hypertensive urgency [I16.0]  Yes    Chronic diastolic heart failure [I50.32]  Yes    Chronic respiratory failure with hypoxia [J96.11]  Yes    Chronic anemia [D64.9]  Yes    Diabetic neuropathy, type II diabetes mellitus [E11.40]  Yes    Type 2 diabetes mellitus with stage 4 chronic kidney disease [E11.22, N18.4]  Yes      Resolved Hospital Problems   No resolved problems to display.     Plan:   Syncope appears to be vasovagal based on presentation.  Carotid ultrasound with no hemodynamically significant stenosis  Follow-up loop recorder interrogation results  SHALINI on CKD stage 4 likely from over-diuresis  Gentle IV fluid hydration.  Recheck renal function tomorrow a.m. with labs  HTN urgency - Off nicardipine drip  Appreciate cardiology input  Continue home diltiazem.  Monitor blood pressure closely  CKD stage 4 noted.  Monitor renal function closely  Stable 4.4 x 4.5 cm abdominal aortic aneurysm       VTE Risk Mitigation (From admission, onward)         Ordered     heparin (porcine) injection 5,000 Units  Every 12 hours         03/21/22 2147     IP VTE HIGH RISK PATIENT  Once         03/21/22 2052     Place sequential compression device  Until discontinued         03/21/22 2052                    Department Hospital Medicine  [facility]  Julian Joiner MD  Date of service: 03/23/2022        Please note: This note was transcribed  using voice recognition software. Because of this technology, there are often uinintended grammatical, spelling, and other transcription errors. Please disregard these errors.

## 2022-03-23 NOTE — NURSING
Interrogated the [employer] device loop recorder. Spoke with the company at 1115 am regarding the report. Fax number given for cardio A. No episodes reported on the device since feb 15th, which at that time was a tachycardic episode or possible oversensing. Will notify MD.

## 2022-03-23 NOTE — PROGRESS NOTES
[facility]  Department of Cardiology  Consult Note      PATIENT NAME: Blaire Cage  MRN: [id number]  TODAY'S DATE: 03/23/2022  ADMIT DATE: 3/21/2022                          CONSULT REQUESTED BY: Julian Joiner MD    SUBJECTIVE     PRINCIPAL PROBLEM: Vasovagal syncope                          INTERVAL HISTORY: 3/23/2022    Patient seems tired. She is dry.   BP elevated HR marginal.  She denies chest pain or SOB.     REASON FOR CONSULT:  HTN Urgency      Per Daughter-  Syncope after bowel movement and diaphoretic- vomited x 1 as well.   Blood sugar was in the 200s  3 Days of loose stool - Imodium was given   History of Vagal syncope        HPI:    89-year-old female patient known to us.  Patient tells me she just has felt really badly over the past 2 days she started having loose bowel movements about 3 days ago.  For the past several months patient's blood pressure has been labile and we have been adjusting medicines accordingly.  Latest blood pressure regimen has been as follows she has been taking losartan 25 mg p.o. b.i.d. and taking Cardizem 180 mg every night.  Despite this blood pressure has been high according to her daughter in the 150s.  Yesterday she was getting ready and had to have a bowel movement and vomited after that shortly after she passed out daughter called 911 and patient was brought to the ER for evaluation.  Upon arrival patient's blood pressure was 200 systolic.  According to patient's daughter she was given Imodium yesterday during morning as patient complained of abdominal pain and loose stools.  Currently patient is feeling much better.  She is off Cardene drip.  Currently heart rates in the 50s blood pressure is 150s.    FROM H AND P     Blaire Cage is a 89 y.o. Black or  female with known history of   CKD stage 3 heart failure ejection fraction 55% hypertension COPD anemia diabetes DVT P used to be on Xarelto but was taken off with by physician  according to family hypothyroidism COVID-19 in September 2021 chronic respiratory failure on 2 L nasal cannula gastric antral vascular ectasia, AAA being monitored as outpatient,  course in the ER today because she had had episode of syncope while in the restroom this morning Ho daughter weakness it it the time because she 1st call her said that she was about to faint and the nurses that she loss consciousness for about 10 minutes she recently has been taking Lasix daily due to episodes of leg swelling she has prescription for p.r.n. she has been taking daily for the past week she also has been having episodes of abdominal pain for the past 2 days with diarrhea for 5 times a day and low p.o. intake she had 1 episode of vomiting today none the previous days the abdominal pain has some radiation to the left lower extremity she denies chest pain shortness are breath.  Daughter says that over pressure usually has been very difficult to control her cardiologist Dr. De La Fuente had recently changed amlodipine to diltiazem in and also starting home losartan in recently she had to had hold losartan dose increased however daughter states this to the pressure was not well controlled when she arrived to the ER initially SBP was around 200s she got 1 dose of from:  In the by ER and blood pressure improved for about 20 minutes and then laid on was again with a SBP around the 200s so we give a dose of labetalol IV 10 mg and she again had improvement of SBP to around 160s for about 30 minutes so we will start on a nicardipine drip and admit patient to cardiology unit.         Review of patient's allergies indicates:   Allergen Reactions    Carvedilol Other (See Comments)     Bradycardia and syncope    Boniva [ibandronate]     Codeine     Hydralazine analogues     Iodinated contrast media Hives    Kionex [sodium polystyrene sulfonate] Diarrhea     From encounter 12/11/17 for diarrhea--not true allergy.    Lisinopril      Morphine        Past Medical History:   Diagnosis Date    Anemia due to multiple mechanisms 6/29/2018    Anemia, chronic disease 6/29/2018    Anemia, chronic renal failure, stage 3 (moderate) 6/29/2018    Anticoagulant long-term use     aspirin    Aortic aneurysm     Chest pain     CHF (congestive heart failure)     COPD (chronic obstructive pulmonary disease)     COPD with acute exacerbation 1/9/2015    Depression     Diabetes mellitus type II     no longer on any DM meds    DVT (deep venous thrombosis) 06/09/2018    Encounter for blood transfusion     GERD (gastroesophageal reflux disease)     GI bleed     Gout     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 8/7/2018    Hip arthritis 3/1/2016    Homocysteinemia 8/7/2018    Hyperlipidemia     Hypertension     Incontinent of urine     Normocytic normochromic anemia 6/29/2018    Oxygen dependent     use oxygen as needed    PE (pulmonary thromboembolism) 06/09/2018    Pneumonia of right lower lobe due to infectious organism 9/11/2017    Syncope     Thyroid disease     hypothyroid    Type 2 diabetes mellitus with stage 3 chronic kidney disease 1/18/2013    UTI (urinary tract infection)      Past Surgical History:   Procedure Laterality Date    APPENDECTOMY      CHOLECYSTECTOMY      COLONOSCOPY Left 10/29/2020    Procedure: COLONOSCOPY;  Surgeon: Singh Kee III, MD;  Location: [facility];  Service: Endoscopy;  Laterality: Left;    ESOPHAGOGASTRODUODENOSCOPY Left 10/26/2020    Procedure: EGD (ESOPHAGOGASTRODUODENOSCOPY);  Surgeon: Kareem Gramajo MD;  Location: [facility];  Service: Endoscopy;  Laterality: Left;    ESOPHAGOGASTRODUODENOSCOPY Left 10/28/2020    Procedure: EGD (ESOPHAGOGASTRODUODENOSCOPY);  Surgeon: Kareem Gramajo MD;  Location: [facility];  Service: Endoscopy;  Laterality: Left;    ESOPHAGOGASTRODUODENOSCOPY N/A 9/16/2021    Procedure: EGD (ESOPHAGOGASTRODUODENOSCOPY);  Surgeon: Kareem Gramajo MD;  Location: [facility];   Service: Endoscopy;  Laterality: N/A;    FRACTURE SURGERY      right hip     HERNIA REPAIR      groin    HYSTERECTOMY      INSERTION OF IMPLANTABLE LOOP RECORDER N/A 12/12/2018    Procedure: Insertion, Implantable Loop Recorder;  Surgeon: Ashok Herbert MD;  Location: [facility] CATH LAB;  Service: Cardiology;  Laterality: N/A;    PERCUTANEOUS PINNING OF HIP Left 3/23/2019    Procedure: PINNING, HIP, PERCUTANEOUS;  Surgeon: Jorje Hamlin MD;  Location: [facility] OR;  Service: Orthopedics;  Laterality: Left;    SMALL BOWEL ENTEROSCOPY N/A 10/29/2020    Procedure: ENTEROSCOPY;  Surgeon: Singh Kee III, MD;  Location: [facility] ENDO;  Service: Endoscopy;  Laterality: N/A;    VARICOSE VEIN SURGERY       Social History     Tobacco Use    Smoking status: Former Smoker     Packs/day: 0.35     Years: 44.00     Pack years: 15.40     Types: Cigarettes     Start date: 1970    Smokeless tobacco: Never Used    Tobacco comment: 1/08/2015   Substance Use Topics    Alcohol use: No     Alcohol/week: 0.0 standard drinks    Drug use: No        REVIEW OF SYSTEMS      JUST FELT BADLY  ABDOMINAL PAIN  HEADACHE  LOOSE STOOL 2 DAYS AGO    OBJECTIVE     VITAL SIGNS (Most Recent)  Temp: 97.6 °F (36.4 °C) (03/23/22 1114)  Pulse: 67 (03/23/22 1114)  Resp: (!) 21 (03/23/22 1114)  BP: (!) 163/72 (03/23/22 1114)  SpO2: 99 % (03/23/22 1114)    VENTILATION STATUS  Resp: (!) 21 (03/23/22 1114)  SpO2: 99 % (03/23/22 1114)       I & O (Last 24H):    Intake/Output Summary (Last 24 hours) at 3/23/2022 1116  Last data filed at 3/23/2022 0836  Gross per 24 hour   Intake 300 ml   Output 800 ml   Net -500 ml       WEIGHTS  Wt Readings from Last 3 Encounters:   03/22/22 0035 59.1 kg (130 lb 4.8 oz)   03/21/22 1350 56.2 kg (124 lb)   03/22/22 1100 59.1 kg (130 lb 4.7 oz)   03/15/22 1356 56.2 kg (124 lb)       PHYSICAL EXAM  GENERAL:  Elderly female in no acute distress  HEENT: Normocephalic. Pupils normal and conjunctivae normal.  Mucous  membranes normal, no cyanosis or icterus, trachea central,no pallor or icterus is noted..   NECK: No JVD. No bruit..   THYROID: Thyroid not enlarged. No nodules present..   CARDIAC: Regular rate and rhythm. S1 is normal.  Systolic murmur present  CHEST ANATOMY: normal.   LUNGS:  Mild wheezing present  ABDOMEN: Soft no masses or organomegaly.  No abdomen pulsations or bruits.  Normal bowel sounds. No pulsations and no masses felt, No guarding or rebound.   URINARY: No carter catheter   EXTREMITIES: No cyanosis, clubbing or edema noted at this time., no calf tenderness bilaterally.   PERIPHERAL VASCULAR SYSTEM: Good palpable distal pulses.   CENTRAL NERVOUS SYSTEM: No focal motor or sensory deficits noted.   SKIN: Skin without lesions, moist, well perfused.   MUSCLE STRENGTH & TONE: No noteable weakness, atrophy or abnormal movement.     HOME MEDICATIONS:  No current facility-administered medications on file prior to encounter.     Current Outpatient Medications on File Prior to Encounter   Medication Sig Dispense Refill    allopurinoL (ZYLOPRIM) 100 MG tablet Take 100 mg by mouth once daily.      ascorbic acid, vitamin C, (VITAMIN C) 500 MG tablet Take 500 mg by mouth once daily.      calcium carbonate (OS-TASIA) 500 mg calcium (1,250 mg) tablet Take 1 tablet by mouth 2 (two) times daily.      diclofenac sodium (VOLTAREN) 1 % Gel Apply 2 g topically once daily. (Patient taking differently: Apply 2 g topically as needed.) 100 g prn    diltiaZEM (CARDIZEM CD) 180 MG 24 hr capsule Take 1 capsule (180 mg total) by mouth once daily. 90 capsule 3    ferrous sulfate (FEOSOL) 325 mg (65 mg iron) Tab tablet Take 325 mg by mouth once daily.      fish oil-omega-3 fatty acids 300-1,000 mg capsule Take 2 g by mouth every evening.      fluticasone (FLONASE) 50 mcg/actuation nasal spray 2 sprays by Each Nare route once daily. (Patient taking differently: 2 sprays by Each Nostril route as needed.) 48 g 3     fluticasone-umeclidin-vilanter (TRELEGY ELLIPTA) 200-62.5-25 mcg inhaler Inhale 1 puff into the lungs once daily. 60 each 11    folic acid-vit B6-vit B12 2.5-25-2 mg (FOLBIC) 2.5-25-2 mg Tab Take 1 tablet by mouth once daily.      furosemide (LASIX) 20 MG tablet Take 1 tablet only as needed, for swelling, increase SOB, weight gain of 3 lbs overnight or 5 lbs for the week (Patient taking differently: Take 20 mg by mouth as needed. Take 1 tablet only as needed, for swelling, increase SOB, weight gain of 3 lbs overnight or 5 lbs for the week) 90 tablet 3    ketoconazole (NIZORAL) 2 % shampoo Apply topically twice a week. (Patient taking differently: Apply 1 application topically twice a week.) 120 mL 2    levothyroxine (SYNTHROID) 88 MCG tablet Take 1 tablet by mouth once daily (Patient taking differently: Take 88 mcg by mouth before breakfast.) 90 tablet 3    losartan (COZAAR) 25 MG tablet Take 1 tablet (25 mg total) by mouth 2 (two) times a day. (Patient taking differently: Take 25 mg by mouth once daily.) 180 tablet 3    magnesium oxide (MAG-OX) 400 mg (241.3 mg magnesium) tablet Take 1 tablet by mouth once daily (Patient taking differently: Take 400 mg by mouth once daily.) 90 tablet 3    montelukast (SINGULAIR) 10 mg tablet Take 1 tablet (10 mg total) by mouth every evening. 90 tablet 3    multivitamin (THERAGRAN) tablet Take 1 tablet by mouth once daily.      omeprazole (PRILOSEC) 40 MG capsule Take 1 capsule by mouth once daily (Patient taking differently: Take 40 mg by mouth once daily.) 90 capsule 3    polyethylene glycol (GLYCOLAX) 17 gram/dose powder Take 17 g by mouth 2 (two) times a day.      rosuvastatin (CRESTOR) 20 MG tablet Take 1 tablet (20 mg total) by mouth once daily. 90 tablet 3    sertraline (ZOLOFT) 25 MG tablet Take 1 tablet (25 mg total) by mouth once daily. 90 tablet 3    tamsulosin (FLOMAX) 0.4 mg Cap Take 1 capsule (0.4 mg total) by mouth once daily. 30 capsule 0    vitamin D  1000 units Tab Take 1 tablet (1,000 Units total) by mouth once daily. 90 tablet 3    albuterol (PROVENTIL) 2.5 mg /3 mL (0.083 %) nebulizer solution USE 1 VIAL IN NEBULIZER EVERY 6 HOURS AS NEEDED FOR WHEEZING. RESCUE (Patient taking differently: Take 2.5 mg by nebulization every 6 (six) hours as needed for Wheezing or Shortness of Breath. USE 1 VIAL IN NEBULIZER EVERY 6 HOURS AS NEEDED FOR WHEEZING. RESCUE) 300 mL 3    meclizine (ANTIVERT) 25 mg tablet Take 1 tablet (25 mg total) by mouth 3 (three) times daily as needed. 20 tablet prn    nitroGLYCERIN (NITROSTAT) 0.4 MG SL tablet Place 1 tablet (0.4 mg total) under the tongue every 5 (five) minutes as needed for Chest pain. As needed (Patient taking differently: Place 0.4 mg under the tongue every 5 (five) minutes as needed for Chest pain. Seek medical help if pain is not relieved after the third dose.) 30 tablet 3       SCHEDULED MEDS:   albuterol sulfate  2.5 mg Nebulization Q6H WAKE    [START ON 3/24/2022] allopurinoL  50 mg Oral Every other day    amLODIPine  5 mg Oral Daily    ascorbic acid (vitamin C)  500 mg Oral Daily    atorvastatin  80 mg Oral Daily    calcium carbonate  500 mg Oral BID    ferrous sulfate  1 tablet Oral Daily    fluticasone furoate-vilanteroL  1 puff Inhalation Daily    fluticasone propionate  2 spray Each Nostril Daily    heparin (porcine)  5,000 Units Subcutaneous Q12H    levothyroxine  88 mcg Oral Before breakfast    montelukast  10 mg Oral Daily    pantoprazole  40 mg Oral Before breakfast    sertraline  25 mg Oral Daily    vitamin D  1,000 Units Oral Daily       CONTINUOUS INFUSIONS:   sodium chloride 0.45% 75 mL/hr at 03/23/22 1019    nicardipine Stopped (03/22/22 0436)       PRN MEDS:albuterol-ipratropium, calcium chloride IVPB, calcium chloride IVPB, calcium chloride IVPB, calcium chloride IVPB, magnesium oxide, magnesium sulfate IVPB, magnesium sulfate IVPB, magnesium sulfate IVPB, magnesium sulfate IVPB,  meclizine, potassium chloride, potassium chloride, potassium chloride, potassium chloride, sodium chloride 0.9%, traMADoL    LABS AND DIAGNOSTICS     CBC LAST 3 DAYS  Recent Labs   Lab 03/21/22  1400 03/22/22  0627   WBC 6.25 5.99   RBC 3.11* 2.79*   HGB 9.8* 8.8*   HCT 31.7* 28.2*   * 101*   MCH 31.5* 31.5*   MCHC 30.9* 31.2*   RDW 15.3* 14.9*   * 160   MPV 11.3 11.6   GRAN 62.1  3.9 56.9  3.4   LYMPH 23.4  1.5 25.9  1.6   MONO 8.8  0.6 9.7  0.6   BASO 0.04 0.04   NRBC 0 0       COAGULATION LAST 3 DAYS  No results for input(s): LABPT, INR, APTT in the last 168 hours.    CHEMISTRY LAST 3 DAYS  Recent Labs   Lab 03/21/22  1400 03/22/22  0627 03/23/22  0541    140 143   K 3.8 3.7 3.7    109 105   CO2 25 25 26   ANIONGAP 8 6* 12   BUN 34* 34* 38*   CREATININE 1.6* 1.9* 2.1*   * 110 107   CALCIUM 8.1* 7.8* 8.1*   MG 1.8  --   --    ALBUMIN 2.9*  --   --    PROT 5.3*  --   --    ALKPHOS 90  --   --    ALT 51*  --   --    AST 61*  --   --    BILITOT 0.5  --   --        CARDIAC PROFILE LAST 3 DAYS  Recent Labs   Lab 03/21/22  1400   *   TROPONINI <0.030       ENDOCRINE LAST 3 DAYS  Recent Labs   Lab 03/22/22  1000   TSH 2.510       LAST ARTERIAL BLOOD GAS  ABG  No results for input(s): PH, PO2, PCO2, HCO3, BE in the last 168 hours.    LAST 7 DAYS MICROBIOLOGY   Microbiology Results (last 7 days)     ** No results found for the last 168 hours. **          MOST RECENT IMAGING  US Carotid Bilateral  EXAM DESCRIPTION:  US CAROTID BILATERAL    CLINICAL HISTORY:  89 years  Female  syncope    COMPARISON:  None    TECHNIQUE:  Two-dimensional and color flow images through the carotid artery systems were obtained. Peak flow  velocity measurements were recorded. Spectral analysis was performed. Peak flow velocity ratios were calculated.    FINDINGS:  Right carotid: Calcification in the right carotid bulb. Peak flow velocities in the right common carotid artery, right internal carotid  artery and right external carotid artery are within normal limits. The ratio of the peak flow velocity of the right internal carotid artery to the right common carotid artery is normal at 1.2. There is calcification and plaque at the right common carotid  artery bifurcation. This results in less than 50% stenosis.  The waveforms appear normal.    Left carotid: Elevated peak systolic velocities in the left common carotid artery and the proximal and mid left internal carotid artery. The ratio of the peak flow velocity of the left internal carotid artery to the left common carotid artery is normal at 0.9. There is calcification and plaque at the left common carotid artery bifurcation. There is spectral broadening throughout the left.    Vertebral arteries: Normal anterograde flow is seen in both vertebral arteries.    IMPRESSION:  Left common and internal carotid artery stenosis, 50-69%.    Electronically signed by:  Jorje Westbrook MD  3/21/2022 10:04 PM CDT Workstation: [id number]  CT Abdomen Pelvis  Without Contrast  CMS MANDATED QUALITY DATA - CT RADIATION - 436    All CT scans at this facility utilize dose modulation, iterative reconstruction, and/or weight based dosing when appropriate to reduce radiation dose to as low as reasonably achievable.    HISTORY: Abdominal pain    FINDINGS: Noncontrast axial images were obtained. Nonenhanced study is tailored for the detection of urolithiasis, and is insensitive for abnormalities of the solid organs, vasculature and hollow viscera.    CT ABDOMEN: The heart is enlarged. There is a small left pleural effusion. There is atelectasis in the right lung base.    The liver, spleen and pancreas have a normal noncontrast appearance. The gallbladder is absent.  The adrenal glands are normal.    The kidneys are symmetric in size without hydronephrosis. There are bilateral nonobstructing renal stones the largest on the left measuring 10 mm.    There are no thick-walled or  dilated bowel loops. There is no mesenteric or retroperitoneal adenopathy.    There is a stable 4.4 x 4.5 cm infrarenal saccular abdominal aortic aneurysm. There is diffuse vascular calcification. The iliac arteries are normal in caliber.    There are degenerative changes of the spine. There is a right hip prosthesis with hardware in the left femoral neck.    CT PELVIS: There is a stable 6 mm stone in the right side of bladder. The uterus is absent. There is no free fluid.    IMPRESSION: No acute abdominal or pelvic process    Stable bilateral nonobstructing renal stones and 6 mm bladder stone    Stable 4.4 x 4.5 cm infrarenal abdominal aortic aneurysm    Colonic diverticulosis    Small left pleural effusion    Electronically signed by:  Jonna Lyn MD  3/21/2022 3:47 PM CDT Workstation: [id number]  CT Head Without Contrast  CMS MANDATED QUALITY DATA - CT RADIATION  436    All CT scans at this facility utilize dose modulation, iterative reconstruction, and/or weight based dosing when appropriate to reduce radiation dose to as low as reasonably achievable.  CT head without contrast    Clinical data: Syncope.    COMPARISON: 7/4/2021    FINDINGS: Noninfusion images were obtained from the skull base to the vertex. There is no intracranial mass, hemorrhage, or midline shift. Ventricles and sulci are mildly prominent. There is stable encephalomalacia in the left cerebellar hemisphere. There are no pathologic extra-axial fluid collections. There is no evidence of cortical ischemic change. Changes of mild chronic microvascular white matter disease are noted. Cerebellum and brainstem are normal. The calvarium is intact.    IMPRESSION:    1. No acute intracranial abnormalities.    Generalized cerebral atrophy with periventricular small vessel disease and stable encephalomalacia in left cerebellar hemisphere    Electronically signed by:  Jonna Lyn MD  3/21/2022 3:43 PM CDT Workstation: [id number]  X-Ray Chest  AP Portable  Reason: CHF    FINDINGS:    PA and lateral chest with comparison chest x-ray January 13, 2022 show unchanged borderline enlarged cardiac mediastinal silhouette. Left Loop recorder device again noted  There is prominence of the perihilar interstitium. Right infrahilar hazy ill-defined opacification noted.. Pulmonary vasculature is normal. No acute osseous abnormality.    IMPRESSION:    1.  Prominence of the perihilar interstitial and right infrahilar hazy ill-defined opacification could reflect pulmonary edema or other infiltrate.  2.  Unchanged borderline enlarged cardiomediastinal silhouette.    Electronically signed by:  Jorje Joyce DO  3/21/2022 2:57 PM CDT Workstation: [id number]      ECHOCARDIOGRAM RESULTS (last 5)  Results for orders placed during the hospital encounter of 10/19/21    Echo    Interpretation Summary  · The estimated PA systolic pressure is 44 mmHg.  · The left ventricle is normal in size with severe concentric hypertrophy and normal systolic function.  · The estimated ejection fraction is 60%.  · Grade II left ventricular diastolic dysfunction.  · Mild right ventricular enlargement.  · Mild left atrial enlargement.  · Mild to moderate tricuspid regurgitation.  · Mild-to-moderate mitral regurgitation.  · Normal central venous pressure (3 mmHg).      Results for orders placed during the hospital encounter of 05/07/20    Echo Color Flow Doppler? Yes; Bubble Contrast? Yes    Interpretation Summary  · Concentric left ventricular hypertrophy.  · The mean diastolic gradient across the mitral valve is 5 mmHg at a heart rate of 57 bpm.  · Normal left ventricular systolic function. The estimated ejection fraction is 65%.  · Grade II (moderate) left ventricular diastolic dysfunction consistent with pseudonormalization.  · Normal right ventricular systolic function.  · Severe left atrial enlargement.  · Mild right atrial enlargement.  · Mild mitral regurgitation.  · Mild to moderate  tricuspid regurgitation.  · Normal central venous pressure (3 mmHg).  · The estimated PA systolic pressure is 50 mmHg.  · Pulmonary hypertension present.  · No PFO on color flow or saline bubble study      CURRENT/PREVIOUS VISIT EKG  Results for orders placed or performed during the hospital encounter of 03/21/22   EKG and show to ED MD    Collection Time: 03/21/22  1:55 PM    Narrative    Test Reason : R55,    Vent. Rate : 071 BPM     Atrial Rate : 071 BPM     P-R Int : 174 ms          QRS Dur : 082 ms      QT Int : 430 ms       P-R-T Axes : 016 018 056 degrees     QTc Int : 467 ms    Normal sinus rhythm  Septal infarct (cited on or before 04-JUL-2021)  Abnormal ECG  When compared with ECG of 13-JAN-2022 14:15,  Nonspecific T wave abnormality no longer evident in Anterior leads  Confirmed by Gualberto De La Fuente MD ([id number]) on 3/22/2022 6:58:20 PM    Referred By: [email or web address]   SELF           Confirmed By:Gualberto De La Fuente MD           ASSESSMENT/PLAN:     Active Hospital Problems    Diagnosis    *Vasovagal syncope    Hypertensive urgency    Chronic diastolic heart failure    Chronic respiratory failure with hypoxia    Chronic anemia    Diabetic neuropathy, type II diabetes mellitus    Type 2 diabetes mellitus with stage 4 chronic kidney disease       ASSESSMENT & PLAN:     1. Hypertensive emergency-improved  2. Syncope-loop recorder in Situ this was after bowel movement and vomiting consider vagal?  3. Stable 4.4 x 4.5 cm infrarenal abdominal aortic aneurysm  4. Diverticulosis  5. Labile blood pressure  6. Acute kidney injury probably secondary to IV Lasix  7. Thyroid dysfunction  8. Dyslipidemia  9. Diabetes  10. Diastolic dysfunction  11. Pulmonary hypertension   12. Anemia- Hg 8.8    RECOMMENDATIONS:      Recommend 0.45 ns @ 75/ hr  Change Cardizem to Amlodipine 5 mg daily  Increase Activity as tolerated  Awaiting Loop interrogation  Further recommendations based upon clinical course            Sadie Perez,  NP  [facility]  Department of Cardiology  Date of Service: 03/23/2022        I have personally interviewed and examined the patient, I have reviewed the Nurse Practitioner's history and physical, assessment, and plan. I agree with the findings and plan.      Tylor De La Fuente M.D.  [facility]  Department of Cardiology  Date of Service: 03/23/2022  8:47 AM

## 2022-03-23 NOTE — CARE UPDATE
03/22/22 1920   Patient Assessment/Suction   Level of Consciousness (AVPU) alert   Respiratory Effort Normal   Expansion/Accessory Muscles/Retractions no use of accessory muscles   All Lung Fields Breath Sounds diminished   Rhythm/Pattern, Respiratory unlabored   Cough Frequency no cough   PRE-TX-O2   SpO2 100 %   Pulse 64   Resp 18   Aerosol Therapy   $ Aerosol Therapy Charges Aerosol Treatment   Daily Review of Necessity (SVN) completed   Respiratory Treatment Status (SVN) given   Treatment Route (SVN) mask;oxygen   Patient Position (SVN) semi-Shetty's   Post Treatment Assessment (SVN) breath sounds unchanged;vital signs unchanged   Signs of Intolerance (SVN) none   Education   $ Education Bronchodilator;15 min   Respiratory Evaluation   $ Care Plan Tech Time 15 min

## 2022-03-23 NOTE — PROGRESS NOTES
Patient called out for help, stated that she was having trouble breathing. Upon assessment, patient was SOB, complaining of chest tightness. Inspiratory wheezes noted. Oxygen increased to 4L nasal cannula, patient encouraged to practice slow breathing. Dr. Ramsey notified. Orders for PRN nebulizer treatment received and administered by JOSE Lomas. Patient now resting comfortably and reports breathing has improved. Will continue to monitor.

## 2022-03-23 NOTE — CARE UPDATE
Continue breathing tx   03/23/22 0727   Patient Assessment/Suction   Level of Consciousness (AVPU) alert   Respiratory Effort Normal;Unlabored   Expansion/Accessory Muscles/Retractions no use of accessory muscles;expansion symmetric   All Lung Fields Breath Sounds wheezes, expiratory;diminished   ESPERANZA Breath Sounds wheezes, expiratory   LLL Breath Sounds diminished   RUL Breath Sounds wheezes, expiratory   RML Breath Sounds diminished   RLL Breath Sounds diminished   Rhythm/Pattern, Respiratory unlabored;depth regular   Cough Frequency infrequent   Cough Type good;dry;no productive sputum   PRE-TX-O2   O2 Device (Oxygen Therapy) nasal cannula   $ Is the patient on Low Flow Oxygen? Yes   Flow (L/min) 3   SpO2 100 %   Pulse Oximetry Type Continuous   $ Pulse Oximetry - Multiple Charge Pulse Oximetry - Multiple   Aerosol Therapy   $ Aerosol Therapy Charges Aerosol Treatment   Daily Review of Necessity (SVN) completed   Respiratory Treatment Status (SVN) given   Treatment Route (SVN) mask;oxygen   Patient Position (SVN) semi-Shetty's   Post Treatment Assessment (SVN) increased aeration   Signs of Intolerance (SVN) none   Inhaler   $ Inhaler Charges MDI (Metered Dose Inahler) Treatment;Mouth rinsed post treatment   Daily Review of Necessity (Inhaler) completed   Respiratory Treatment Status (Inhaler) given   Treatment Route (Inhaler) mouthpiece   Patient Position (Inhaler) semi-Shetty's   Post Treatment Assessment (Inhaler) increased aeration   Signs of Intolerance (Inhaler) none   Breath Sounds Post-Respiratory Treatment   Throughout All Fields Post-Treatment All Fields   Throughout All Fields Post-Treatment aeration increased   Post-treatment Heart Rate (beats/min) 63   Post-treatment Resp Rate (breaths/min) 17   Education   $ Education Bronchodilator;15 min   Respiratory Evaluation   $ Care Plan Tech Time 15 min   $ Eval/Re-eval Charges Re-evaluation

## 2022-03-24 NOTE — CARE UPDATE
03/23/22 2030   Patient Assessment/Suction   Respiratory Effort Unlabored   ESPERANZA Breath Sounds clear   LLL Breath Sounds diminished   RUL Breath Sounds clear   RML Breath Sounds diminished   RLL Breath Sounds diminished   Rhythm/Pattern, Respiratory pattern regular   PRE-TX-O2   O2 Device (Oxygen Therapy) nasal cannula   Flow (L/min) 3  (decreased to 2)   SpO2 99 %   Pulse Oximetry Type Continuous   Pulse 62   Resp 16   Aerosol Therapy   $ Aerosol Therapy Charges Aerosol Treatment   Respiratory Treatment Status (SVN) given   Treatment Route (SVN) mask   Patient Position (SVN) HOB elevated   Post Treatment Assessment (SVN) increased aeration   Signs of Intolerance (SVN) none   Breath Sounds Post-Respiratory Treatment   Throughout All Fields Post-Treatment All Fields   Throughout All Fields Post-Treatment wheezes, expiratory   Post-treatment Heart Rate (beats/min) 59   Post-treatment Resp Rate (breaths/min) 16   Education   $ Education 15 min;Bronchodilator   Respiratory Evaluation   $ Care Plan Tech Time 15 min   $ Eval/Re-eval Charges Re-evaluation   Evaluation For   (care plan)

## 2022-03-24 NOTE — PROGRESS NOTES
Discharge instructions provided to pt and daughter, both voice understanding. IV/tele monitoring discontinued. Pt discharged via wheelchair with belongings.

## 2022-03-24 NOTE — PLAN OF CARE
Problem: Adult Inpatient Plan of Care  Goal: Plan of Care Review  Outcome: Ongoing, Progressing  Goal: Patient-Specific Goal (Individualized)  Outcome: Ongoing, Progressing  Goal: Absence of Hospital-Acquired Illness or Injury  Outcome: Ongoing, Progressing  Goal: Optimal Comfort and Wellbeing  Outcome: Ongoing, Progressing  Goal: Readiness for Transition of Care  Outcome: Ongoing, Progressing     Problem: Diabetes Comorbidity  Goal: Blood Glucose Level Within Targeted Range  Outcome: Ongoing, Progressing     Problem: Fall Injury Risk  Goal: Absence of Fall and Fall-Related Injury  Outcome: Ongoing, Progressing     Problem: Hypertension Comorbidity  Goal: Blood Pressure in Desired Range  Outcome: Ongoing, Progressing     Problem: Syncope  Goal: Absence of Syncopal Symptoms  Outcome: Ongoing, Progressing

## 2022-03-24 NOTE — PROGRESS NOTES
"[facility]  Adult Nutrition   Progress Note (Initial Assessment)     SUMMARY     Recommendations  Recommendation/Intervention: 1. RD recommends and has added a vanillaGlucerna sugar-free milkshake to meet needs when intake is insufficient. 2. Continue current 2000 kcal ADA diet as tolerated. 3.  continue to get meal preferences daily.  Goals: Patient to meet 75% or more of her estimated energy and protein needs met with her PO diet and ONS.  Nutrition Goal Status: progressing towards goal  Communication of RD Recs: other (comment) (patient and daughter)    Dietitian Rounds Brief  MST of 3: Spoke with pt and her daughter about pts poor PO intake and we decided to add a vanilla Glucerna milkshake with all meals. LBM was 3/21/2022. Will follow prn.    Diet order:   Current Diet Order: 2000 kcal ADA     % Intake of Estimated Energy Needs: 25 - 50 %  % Meal Intake: 25 - 50 %    Energy Calories Required: not meeting needs  Protein Required: not meeting needs  Fluid Required: meeting needs  Tolerance: tolerating    Anthropometrics  Temp: 97.9 °F (36.6 °C)  Height Method: Stated  Height: 5' 3" (160 cm)  Height (inches): 63 in  Weight Method: Bed Scale  Weight: 58.1 kg (128 lb)  Weight (lb): 128 lb  Ideal Body Weight (IBW), Female: 115 lb  % Ideal Body Weight, Female (lb): 113.3 %  BMI (Calculated): 22.7  BMI Grade: 18.5-24.9 - normal       Estimated/Assessed Needs  Weight Used For Calorie Calculations: 58 kg (127 lb 13.9 oz)  Energy Calorie Requirements (kcal): 3741-4761 kcals/day (25-30 kcals/kg ABW)  Energy Need Method: Kcal/kg  Protein Requirements: 63-78 g/day (1.2-1.5 g/kg IBW)  Weight Used For Protein Calculations: 52 kg (114 lb 10.2 oz)  Fluid Requirements (mL): 1 ml/kcal or per MD  Estimated Fluid Requirement Method: RDA Method  RDA Method (mL): 1450       Reason for Assessment  Reason For Assessment: identified at risk by screening criteria  Diagnosis: cardiac disease, pulmonary disease, renal " disease, diabetes diagnosis/complications, gastrointestinal disease, psychological disorder, other (see comments) (Vasovagal syncope)  Relevant Medical History: Hypertension, Hyperlipidemia, Thyroid disease, hypothyroid, Encounter for blood transfusion, COPD (chronic obstructive pulmonary disease), COPD with acute exacerbation, Anticoagulant long-term use aspirin, Hip arthritis, Pneumonia of right lower lobe due to infectious organism, PE (pulmonary thromboembolism), DVT (deep venous thrombosis), Anemia due to multiple mechanisms, Anemia, chronic disease, Normocytic normochromic anemia, Homocysteinemia, Heterozygous MTHFR mutation, CHF (congestive heart failure), Aortic aneurysm, Chest pain, Syncope, GERD (gastroesophageal reflux disease), GI bleed, Diabetes mellitus type II, no longer on any DM meds, UTI (urinary tract infection), Anemia, chronic renal failure, stage 3 (moderate), Type 2 diabetes mellitus with stage 3 chronic kidney disease, Incontinent of urine, Oxygen dependent, use oxygen as needed, Gout, Depression  Interdisciplinary Rounds: attended    Nutrition/Diet History  Spiritual, Cultural Beliefs, Confucianist Practices, Values that Affect Care: no  Food Allergies: other (see comments) (Iodinated Contrast Media)    Weight History:  Wt Readings from Last 5 Encounters:   03/24/22 58.1 kg (128 lb)   03/22/22 59.1 kg (130 lb 4.7 oz)   03/15/22 56.2 kg (124 lb)   03/08/22 58 kg (127 lb 15.6 oz)   02/08/22 57.7 kg (127 lb 1.6 oz)        Lab/Procedures/Meds: Pertinent Labs/Meds Reviewed    Medications:Pertinent Medications Reviewed    Scheduled Meds:   albuterol sulfate  2.5 mg Nebulization Q6H WAKE    allopurinoL  50 mg Oral Every other day    amLODIPine  5 mg Oral Daily    ascorbic acid (vitamin C)  500 mg Oral Daily    atorvastatin  40 mg Oral QHS    calcium carbonate  500 mg Oral BID    cloNIDine  0.1 mg Oral BID    ferrous sulfate  1 tablet Oral Daily    fluticasone furoate-vilanteroL  1 puff  Inhalation Daily    fluticasone propionate  2 spray Each Nostril Daily    heparin (porcine)  5,000 Units Subcutaneous Q12H    levothyroxine  88 mcg Oral Before breakfast    montelukast  10 mg Oral Daily    pantoprazole  40 mg Oral Before breakfast    sertraline  25 mg Oral Daily    vitamin D  1,000 Units Oral Daily     Continuous Infusions:   sodium chloride 0.45% 75 mL/hr at 03/24/22 0507    nicardipine Stopped (03/22/22 0436)     PRN Meds:.albuterol-ipratropium, calcium chloride IVPB, calcium chloride IVPB, calcium chloride IVPB, calcium chloride IVPB, magnesium oxide, magnesium sulfate IVPB, magnesium sulfate IVPB, magnesium sulfate IVPB, magnesium sulfate IVPB, meclizine, potassium chloride, potassium chloride, potassium chloride, potassium chloride, sodium chloride 0.9%, traMADoL    Labs: Pertinent Labs Reviewed    Clinical Chemistry:  Recent Labs   Lab 03/21/22  1400 03/22/22  0627 03/24/22  0436      < > 140   K 3.8   < > 4.0      < > 107   CO2 25   < > 25   *   < > 109   BUN 34*   < > 38*   CREATININE 1.6*   < > 1.8*   CALCIUM 8.1*   < > 8.3*   PROT 5.3*  --   --    ALBUMIN 2.9*  --   --    BILITOT 0.5  --   --    ALKPHOS 90  --   --    AST 61*  --   --    ALT 51*  --   --    ANIONGAP 8   < > 8   ESTGFRAFRICA 32.2*   < > 28.0*   EGFRNONAA 28.0*   < > 24.3*   MG 1.8  --   --     < > = values in this interval not displayed.     CBC:   Recent Labs   Lab 03/22/22  0627   WBC 5.99   RBC 2.79*   HGB 8.8*   HCT 28.2*      *   MCH 31.5*   MCHC 31.2*     Cardiac Profile:  Recent Labs   Lab 03/21/22  1400   *   TROPONINI <0.030     Thyroid & Parathyroid:  Recent Labs   Lab 03/22/22  1000   TSH 2.510       Monitor and Evaluation  Food and Nutrient Intake: energy intake, food and beverage intake  Food and Nutrient Adminstration: diet order  Knowledge/Beliefs/Attitudes: food and nutrition knowledge/skill, beliefs and attitudes  Physical Activity and Function:  nutrition-related ADLs and IADLs, factors affecting access to physical activity  Anthropometric Measurements: body mass index, weight change, weight  Biochemical Data, Medical Tests and Procedures: lipid profile, inflammatory profile, glucose/endocrine profile, gastrointestinal profile, electrolyte and renal panel  Nutrition-Focused Physical Findings: overall appearance     Nutrition Risk  Level of Risk/Frequency of Follow-up: high     Nutrition Follow-Up  RD Follow-up?: Yes      Clarisa De Los Santos RD 03/24/2022 1:43 PM

## 2022-03-24 NOTE — NURSING
Spoke with pt daughter Annabella about probable discharge this afternoon pending BP. States she can be here around 2:30- 3pm to  pt.

## 2022-03-24 NOTE — DISCHARGE SUMMARY
[facility] Medicine  Discharge Summary      Patient Name: Blaire Cage  MRN: [id number]  Patient Class: IP- Inpatient  Admission Date: 3/21/2022  Hospital Length of Stay: 3 days  Discharge Date and Time:  03/24/2022 4:21 PM  Attending Physician: No att. providers found   Discharging Provider: Malik Fu MD  Primary Care Provider: Eli Edmonds MD      HPI:   No notes on file    * No surgery found *      Hospital Course:       HISTORY OF PRESENT ILLNESS:   Blaire Cage is a 89 y.o. Black or  female with known history of   CKD stage 3 heart failure ejection fraction 55% hypertension COPD anemia diabetes DVT P used to be on Xarelto but was taken off with by physician according to family hypothyroidism COVID-19 in September 2021 chronic respiratory failure on 2 L nasal cannula gastric antral vascular ectasia, AAA being monitored as outpatient,  course in the ER today because she had had episode of syncope while in the restroom this morning Ho daughter weakness it it the time because she 1st call her said that she was about to faint and the nurses that she loss consciousness for about 10 minutes she recently has been taking Lasix daily due to episodes of leg swelling she has prescription for p.r.n. she has been taking daily for the past week she also has been having episodes of abdominal pain for the past 2 days with diarrhea for 5 times a day and low p.o. intake she had 1 episode of vomiting today none the previous days the abdominal pain has some radiation to the left lower extremity she denies chest pain shortness are breath.  Daughter says that over pressure usually has been very difficult to control her cardiologist Dr. De La Fuente had recently changed amlodipine to diltiazem in and also starting home losartan in recently she had to had hold losartan dose increased however daughter states this to the pressure was not well controlled when she arrived to the ER initially  SBP was around 200s she got 1 dose of from:  In the by ER and blood pressure improved for about 20 minutes and then laid on was again with a SBP around the 200s so we give a dose of labetalol IV 10 mg and she again had improvement of SBP to around 160s for about 30 minutes so we will start on a nicardipine drip and admit patient to cardiology unit.             Hospital course:  Patient is a pleasant 89-year-old female with known CKD stage 4, chronic diastolic congestive heart failure, COPD on p.r.n. oxygen, hypothyroidism, type 2 DM and essential hypertension admitted after presenting with a syncopal episode.  She was in hypertensive urgency and required nicardipine drip briefly.  Based on clinical presentation syncope appears to be vasovagal in nature.  Carotid ultrasound with no hemodynamically significant stenosis.    Hospital stay notable for SHALINI on CKD stage 3 likely from IV diuretics and was held at DC for few days .  Her blood pressure was persistently elevated and later clonidine added.  ACE-inhibitor was discontinuedAnd resumed her home Cardize   Cardiologist's appointment given 2 weeks at the discharge. BP better controlled at PA .         Goals of Care Treatment Preferences:  Code Status: Full Code      Consults:   Consults (From admission, onward)        Status Ordering Provider     IP consult to case management  Once        Provider:  (Not yet assigned)    Completed PINO WEN     Inpatient consult to Cardiology  Once        Provider:  Tylor De La Fuente MD    Completed PINO WEN     Inpatient consult to Hospitalist  Once        Provider:  Pino Wen MD    Acknowledged HUSEYIN WEBB          No new Assessment & Plan notes have been filed under this hospital service since the last note was generated.  Service: Hospital Medicine    Final Active Diagnoses:    Diagnosis Date Noted POA    PRINCIPAL PROBLEM:  Vasovagal syncope [R55] 08/24/2016 Yes    Hypertensive urgency [I16.0]  03/22/2022 Yes    Chronic diastolic heart failure [I50.32] 08/18/2020 Yes    Chronic respiratory failure with hypoxia [J96.11] 08/09/2019 Yes    Chronic anemia [D64.9] 06/29/2018 Yes    Diabetic neuropathy, type II diabetes mellitus [E11.40] 01/18/2013 Yes    Type 2 diabetes mellitus with stage 4 chronic kidney disease [E11.22, N18.4] 01/18/2013 Yes      Problems Resolved During this Admission:       Discharged Condition: good    Disposition: Home or Self Care    Follow Up:   Follow-up Information     Tylor De La Fuente MD Follow up in 2 week(s).    Specialties: Interventional Cardiology, Cardiology  Contact information:  [address] [id number]  CARDIOLOGY [place]  [place] [address] [phone]             Eli Edmonds MD Follow up in 2 week(s).    Specialty: Family Medicine  Contact information:  [address] [facility]  [place] [address] [phone]                       Patient Instructions:      Diet Cardiac     Activity as tolerated       Significant Diagnostic Studies: Labs:   BMP:   Recent Labs   Lab 03/23/22  0541 03/24/22  0436    109    140   K 3.7 4.0    107   CO2 26 25   BUN 38* 38*   CREATININE 2.1* 1.8*   CALCIUM 8.1* 8.3*    and CMP   Recent Labs   Lab 03/23/22  0541 03/24/22  0436    140   K 3.7 4.0    107   CO2 26 25    109   BUN 38* 38*   CREATININE 2.1* 1.8*   CALCIUM 8.1* 8.3*   ANIONGAP 12 8   ESTGFRAFRICA 23.2* 28.0*   EGFRNONAA 20.1* 24.3*       Pending Diagnostic Studies:     None         Medications:  Reconciled Home Medications:      Medication List      START taking these medications    cloNIDine 0.1 MG tablet  Commonly known as: CATAPRES  Take 1 tablet (0.1 mg total) by mouth 2 (two) times daily.        CHANGE how you take these medications    albuterol 2.5 mg /3 mL (0.083 %) nebulizer solution  Commonly known as: PROVENTIL  USE 1 VIAL IN NEBULIZER EVERY 6 HOURS AS NEEDED FOR WHEEZING. RESCUE  What changed:   · how much to take  · how to take  this  · when to take this  · reasons to take this     diclofenac sodium 1 % Gel  Commonly known as: VOLTAREN  Apply 2 g topically once daily.  What changed:   · when to take this  · reasons to take this     fluticasone propionate 50 mcg/actuation nasal spray  Commonly known as: FLONASE  2 sprays by Each Nare route once daily.  What changed:   · when to take this  · reasons to take this     ketoconazole 2 % shampoo  Commonly known as: NIZORAL  Apply topically twice a week.  What changed: how much to take     levothyroxine 88 MCG tablet  Commonly known as: SYNTHROID  Take 1 tablet by mouth once daily  What changed: when to take this     nitroGLYCERIN 0.4 MG SL tablet  Commonly known as: NITROSTAT  Place 1 tablet (0.4 mg total) under the tongue every 5 (five) minutes as needed for Chest pain. As needed  What changed: additional instructions        CONTINUE taking these medications    allopurinoL 100 MG tablet  Commonly known as: ZYLOPRIM  Take 100 mg by mouth once daily.     ascorbic acid (vitamin C) 500 MG tablet  Commonly known as: VITAMIN C  Take 500 mg by mouth once daily.     calcium carbonate 500 mg calcium (1,250 mg) tablet  Commonly known as: OS-TASIA  Take 1 tablet by mouth 2 (two) times daily.     diltiaZEM 180 MG 24 hr capsule  Commonly known as: CARDIZEM CD  Take 1 capsule (180 mg total) by mouth once daily.     ferrous sulfate 325 mg (65 mg iron) Tab tablet  Commonly known as: FEOSOL  Take 325 mg by mouth once daily.     fish oil-omega-3 fatty acids 300-1,000 mg capsule  Take 2 g by mouth every evening.     FOLBIC 2.5-25-2 mg Tab  Generic drug: folic acid-vit B6-vit B12 2.5-25-2 mg  Take 1 tablet by mouth once daily.     furosemide 20 MG tablet  Commonly known as: LASIX  Take 1 tablet (20 mg total) by mouth as needed. Take 1 tablet only as needed, for swelling, increase SOB, weight gain of 3 lbs overnight or 5 lbs for the week     magnesium oxide 400 mg (241.3 mg magnesium) tablet  Commonly known as:  MAG-OX  Take 1 tablet by mouth once daily     meclizine 25 mg tablet  Commonly known as: ANTIVERT  Take 1 tablet (25 mg total) by mouth 3 (three) times daily as needed.     montelukast 10 mg tablet  Commonly known as: SINGULAIR  Take 1 tablet (10 mg total) by mouth every evening.     multivitamin tablet  Commonly known as: THERAGRAN  Take 1 tablet by mouth once daily.     omeprazole 40 MG capsule  Commonly known as: PRILOSEC  Take 1 capsule by mouth once daily     polyethylene glycol 17 gram/dose powder  Commonly known as: GLYCOLAX  Take 17 g by mouth 2 (two) times a day.     rosuvastatin 20 MG tablet  Commonly known as: CRESTOR  Take 1 tablet (20 mg total) by mouth once daily.     sertraline 25 MG tablet  Commonly known as: ZOLOFT  Take 1 tablet (25 mg total) by mouth once daily.     tamsulosin 0.4 mg Cap  Commonly known as: FLOMAX  Take 1 capsule (0.4 mg total) by mouth once daily.     TRELEGY ELLIPTA 200-62.5-25 mcg inhaler  Generic drug: fluticasone-umeclidin-vilanter  Inhale 1 puff into the lungs once daily.     vitamin D 1000 units Tab  Commonly known as: VITAMIN D3  Take 1 tablet (1,000 Units total) by mouth once daily.        STOP taking these medications    losartan 25 MG tablet  Commonly known as: COZAAR            Indwelling Lines/Drains at time of discharge:   Lines/Drains/Airways     Drain  Duration           Female External Urinary Catheter 03/22/22 0907 2 days              General: Patient resting comfortably in no acute distress. Appears as stated age. Calm  Eyes: EOM intact. No conjunctivae injection. No scleral icterus.  ENT: Hearing grossly intact. No discharge from ears. No nasal discharge.   CVS: RRR. No LE edema BL.  Lungs: Good breath sounds. No accessory muscle use. No acute respiratory distress  Neuro: non focal , Follows commands. Responds appropriately  Abdomen soft   Time spent on the discharge of patient: 44 minutes         Malik Fu MD  Department of Hospital Medicine  [facility]

## 2022-03-24 NOTE — HOSPITAL COURSE
HISTORY OF PRESENT ILLNESS:   Blaire Cage is a 89 y.o. Black or  female with known history of   CKD stage 3 heart failure ejection fraction 55% hypertension COPD anemia diabetes DVT P used to be on Xarelto but was taken off with by physician according to family hypothyroidism COVID-19 in September 2021 chronic respiratory failure on 2 L nasal cannula gastric antral vascular ectasia, AAA being monitored as outpatient,  course in the ER today because she had had episode of syncope while in the restroom this morning Ho daughter weakness it it the time because she 1st call her said that she was about to faint and the nurses that she loss consciousness for about 10 minutes she recently has been taking Lasix daily due to episodes of leg swelling she has prescription for p.r.n. she has been taking daily for the past week she also has been having episodes of abdominal pain for the past 2 days with diarrhea for 5 times a day and low p.o. intake she had 1 episode of vomiting today none the previous days the abdominal pain has some radiation to the left lower extremity she denies chest pain shortness are breath.  Daughter says that over pressure usually has been very difficult to control her cardiologist Dr. De La Fuente had recently changed amlodipine to diltiazem in and also starting home losartan in recently she had to had hold losartan dose increased however daughter states this to the pressure was not well controlled when she arrived to the ER initially SBP was around 200s she got 1 dose of from:  In the by ER and blood pressure improved for about 20 minutes and then laid on was again with a SBP around the 200s so we give a dose of labetalol IV 10 mg and she again had improvement of SBP to around 160s for about 30 minutes so we will start on a nicardipine drip and admit patient to cardiology unit.             Hospital course:  Patient is a pleasant 89-year-old female with known CKD stage 4, chronic  diastolic congestive heart failure, COPD on p.r.n. oxygen, hypothyroidism, type 2 DM and essential hypertension admitted after presenting with a syncopal episode.  She was in hypertensive urgency and required nicardipine drip briefly.  Based on clinical presentation syncope appears to be vasovagal in nature.  Carotid ultrasound with no hemodynamically significant stenosis.    Hospital stay notable for SHALINI on CKD stage 3 likely from IV diuretics and was held at [facility] for few days .  Her blood pressure was persistently elevated and later clonidine added.  ACE-inhibitor was discontinuedAnd resumed her home [facility]   Cardiologist's appointment given 2 weeks at the discharge. BP better controlled at [facility] .

## 2022-03-24 NOTE — PROGRESS NOTES
[facility]  Department of Cardiology  Consult Note      PATIENT NAME: Blaire Cage  MRN: [id number]  TODAY'S DATE: 03/24/2022  ADMIT DATE: 3/21/2022                          CONSULT REQUESTED BY: Malik Fu MD    SUBJECTIVE     PRINCIPAL PROBLEM: Vasovagal syncope      Interval History 3/24/2022    Patient just feels sleepy. She denies chest pain or SOB. BP still elevated.    INTERVAL HISTORY: 3/23/2022    Patient seems tired. She is dry.   BP elevated HR marginal.  She denies chest pain or SOB.     REASON FOR CONSULT:  HTN Urgency      Per Daughter-  Syncope after bowel movement and diaphoretic- vomited x 1 as well.   Blood sugar was in the 200s  3 Days of loose stool - Imodium was given   History of Vagal syncope        HPI:    89-year-old female patient known to us.  Patient tells me she just has felt really badly over the past 2 days she started having loose bowel movements about 3 days ago.  For the past several months patient's blood pressure has been labile and we have been adjusting medicines accordingly.  Latest blood pressure regimen has been as follows she has been taking losartan 25 mg p.o. b.i.d. and taking Cardizem 180 mg every night.  Despite this blood pressure has been high according to her daughter in the 150s.  Yesterday she was getting ready and had to have a bowel movement and vomited after that shortly after she passed out daughter called 911 and patient was brought to the ER for evaluation.  Upon arrival patient's blood pressure was 200 systolic.  According to patient's daughter she was given Imodium yesterday during morning as patient complained of abdominal pain and loose stools.  Currently patient is feeling much better.  She is off Cardene drip.  Currently heart rates in the 50s blood pressure is 150s.    FROM H AND P     Blaire Cage is a 89 y.o. Black or  female with known history of   CKD stage 3 heart failure ejection fraction 55% hypertension  COPD anemia diabetes DVT P used to be on Xarelto but was taken off with by physician according to family hypothyroidism COVID-19 in September 2021 chronic respiratory failure on 2 L nasal cannula gastric antral vascular ectasia, AAA being monitored as outpatient,  course in the ER today because she had had episode of syncope while in the restroom this morning Ho daughter weakness it it the time because she 1st call her said that she was about to faint and the nurses that she loss consciousness for about 10 minutes she recently has been taking Lasix daily due to episodes of leg swelling she has prescription for p.r.n. she has been taking daily for the past week she also has been having episodes of abdominal pain for the past 2 days with diarrhea for 5 times a day and low p.o. intake she had 1 episode of vomiting today none the previous days the abdominal pain has some radiation to the left lower extremity she denies chest pain shortness are breath.  Daughter says that over pressure usually has been very difficult to control her cardiologist Dr. De La Fuente had recently changed amlodipine to diltiazem in and also starting home losartan in recently she had to had hold losartan dose increased however daughter states this to the pressure was not well controlled when she arrived to the ER initially SBP was around 200s she got 1 dose of from:  In the by ER and blood pressure improved for about 20 minutes and then laid on was again with a SBP around the 200s so we give a dose of labetalol IV 10 mg and she again had improvement of SBP to around 160s for about 30 minutes so we will start on a nicardipine drip and admit patient to cardiology unit.         Review of patient's allergies indicates:   Allergen Reactions    Carvedilol Other (See Comments)     Bradycardia and syncope    Boniva [ibandronate]     Codeine     Hydralazine analogues     Iodinated contrast media Hives    Kionex [sodium polystyrene sulfonate] Diarrhea      From encounter 12/11/17 for diarrhea--not true allergy.    Lisinopril     Morphine        Past Medical History:   Diagnosis Date    Anemia due to multiple mechanisms 6/29/2018    Anemia, chronic disease 6/29/2018    Anemia, chronic renal failure, stage 3 (moderate) 6/29/2018    Anticoagulant long-term use     aspirin    Aortic aneurysm     Chest pain     CHF (congestive heart failure)     COPD (chronic obstructive pulmonary disease)     COPD with acute exacerbation 1/9/2015    Depression     Diabetes mellitus type II     no longer on any DM meds    DVT (deep venous thrombosis) 06/09/2018    Encounter for blood transfusion     GERD (gastroesophageal reflux disease)     GI bleed     Gout     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 8/7/2018    Hip arthritis 3/1/2016    Homocysteinemia 8/7/2018    Hyperlipidemia     Hypertension     Incontinent of urine     Normocytic normochromic anemia 6/29/2018    Oxygen dependent     use oxygen as needed    PE (pulmonary thromboembolism) 06/09/2018    Pneumonia of right lower lobe due to infectious organism 9/11/2017    Syncope     Thyroid disease     hypothyroid    Type 2 diabetes mellitus with stage 3 chronic kidney disease 1/18/2013    UTI (urinary tract infection)      Past Surgical History:   Procedure Laterality Date    APPENDECTOMY      CHOLECYSTECTOMY      COLONOSCOPY Left 10/29/2020    Procedure: COLONOSCOPY;  Surgeon: Singh Kee III, MD;  Location: [facility];  Service: Endoscopy;  Laterality: Left;    ESOPHAGOGASTRODUODENOSCOPY Left 10/26/2020    Procedure: EGD (ESOPHAGOGASTRODUODENOSCOPY);  Surgeon: Kareem Gramajo MD;  Location: [facility];  Service: Endoscopy;  Laterality: Left;    ESOPHAGOGASTRODUODENOSCOPY Left 10/28/2020    Procedure: EGD (ESOPHAGOGASTRODUODENOSCOPY);  Surgeon: Kareem Gramajo MD;  Location: [facility];  Service: Endoscopy;  Laterality: Left;    ESOPHAGOGASTRODUODENOSCOPY N/A 9/16/2021    Procedure: EGD  (ESOPHAGOGASTRODUODENOSCOPY);  Surgeon: Kareem Gramajo MD;  Location: [facility] ENDO;  Service: Endoscopy;  Laterality: N/A;    FRACTURE SURGERY      right hip     HERNIA REPAIR      groin    HYSTERECTOMY      INSERTION OF IMPLANTABLE LOOP RECORDER N/A 12/12/2018    Procedure: Insertion, Implantable Loop Recorder;  Surgeon: Ashok Herbert MD;  Location: [facility] CATH LAB;  Service: Cardiology;  Laterality: N/A;    PERCUTANEOUS PINNING OF HIP Left 3/23/2019    Procedure: PINNING, HIP, PERCUTANEOUS;  Surgeon: Jorje Hamlin MD;  Location: [facility] OR;  Service: Orthopedics;  Laterality: Left;    SMALL BOWEL ENTEROSCOPY N/A 10/29/2020    Procedure: ENTEROSCOPY;  Surgeon: Singh Kee III, MD;  Location: [facility] ENDO;  Service: Endoscopy;  Laterality: N/A;    VARICOSE VEIN SURGERY       Social History     Tobacco Use    Smoking status: Former Smoker     Packs/day: 0.35     Years: 44.00     Pack years: 15.40     Types: Cigarettes     Start date: 1970    Smokeless tobacco: Never Used    Tobacco comment: 1/08/2015   Substance Use Topics    Alcohol use: No     Alcohol/week: 0.0 standard drinks    Drug use: No        REVIEW OF SYSTEMS      sleepy    OBJECTIVE     VITAL SIGNS (Most Recent)  Temp: 97.7 °F (36.5 °C) (03/24/22 0759)  Pulse: 74 (03/24/22 0759)  Resp: (!) 36 (03/24/22 0759)  BP: (!) 186/77 (03/24/22 0759)  SpO2: (!) 92 % (03/24/22 0759)    VENTILATION STATUS  Resp: (!) 36 (03/24/22 0759)  SpO2: (!) 92 % (03/24/22 0759)       I & O (Last 24H):    Intake/Output Summary (Last 24 hours) at 3/24/2022 1018  Last data filed at 3/24/2022 0610  Gross per 24 hour   Intake 1200 ml   Output 1110 ml   Net 90 ml       WEIGHTS  Wt Readings from Last 3 Encounters:   03/24/22 0410 58.1 kg (128 lb)   03/22/22 0035 59.1 kg (130 lb 4.8 oz)   03/21/22 1350 56.2 kg (124 lb)   03/22/22 1100 59.1 kg (130 lb 4.7 oz)   03/15/22 1356 56.2 kg (124 lb)       PHYSICAL EXAM  GENERAL:  Elderly female in no acute distress  HEENT:  Normocephalic. Pupils normal and conjunctivae normal.  Mucous membranes normal, no cyanosis or icterus, trachea central,no pallor or icterus is noted..   NECK: No JVD. No bruit..   THYROID: Thyroid not enlarged. No nodules present..   CARDIAC: Regular rate and rhythm. S1 is normal.  Systolic murmur present  CHEST ANATOMY: normal.   LUNGS:  Mild wheezing present  ABDOMEN: Soft no masses or organomegaly.  No abdomen pulsations or bruits.  Normal bowel sounds. No pulsations and no masses felt, No guarding or rebound.   URINARY: No carter catheter   EXTREMITIES: No cyanosis, clubbing or edema noted at this time., no calf tenderness bilaterally.   PERIPHERAL VASCULAR SYSTEM: Good palpable distal pulses.   CENTRAL NERVOUS SYSTEM: No focal motor or sensory deficits noted.   SKIN: Skin without lesions, moist, well perfused.   MUSCLE STRENGTH & TONE: No noteable weakness, atrophy or abnormal movement.     HOME MEDICATIONS:  No current facility-administered medications on file prior to encounter.     Current Outpatient Medications on File Prior to Encounter   Medication Sig Dispense Refill    allopurinoL (ZYLOPRIM) 100 MG tablet Take 100 mg by mouth once daily.      ascorbic acid, vitamin C, (VITAMIN C) 500 MG tablet Take 500 mg by mouth once daily.      calcium carbonate (OS-TASIA) 500 mg calcium (1,250 mg) tablet Take 1 tablet by mouth 2 (two) times daily.      diclofenac sodium (VOLTAREN) 1 % Gel Apply 2 g topically once daily. (Patient taking differently: Apply 2 g topically as needed.) 100 g prn    diltiaZEM (CARDIZEM CD) 180 MG 24 hr capsule Take 1 capsule (180 mg total) by mouth once daily. 90 capsule 3    ferrous sulfate (FEOSOL) 325 mg (65 mg iron) Tab tablet Take 325 mg by mouth once daily.      fish oil-omega-3 fatty acids 300-1,000 mg capsule Take 2 g by mouth every evening.      fluticasone (FLONASE) 50 mcg/actuation nasal spray 2 sprays by Each Nare route once daily. (Patient taking differently: 2 sprays by  Each Nostril route as needed.) 48 g 3    fluticasone-umeclidin-vilanter (TRELEGY ELLIPTA) 200-62.5-25 mcg inhaler Inhale 1 puff into the lungs once daily. 60 each 11    folic acid-vit B6-vit B12 2.5-25-2 mg (FOLBIC) 2.5-25-2 mg Tab Take 1 tablet by mouth once daily.      furosemide (LASIX) 20 MG tablet Take 1 tablet only as needed, for swelling, increase SOB, weight gain of 3 lbs overnight or 5 lbs for the week (Patient taking differently: Take 20 mg by mouth as needed. Take 1 tablet only as needed, for swelling, increase SOB, weight gain of 3 lbs overnight or 5 lbs for the week) 90 tablet 3    ketoconazole (NIZORAL) 2 % shampoo Apply topically twice a week. (Patient taking differently: Apply 1 application topically twice a week.) 120 mL 2    levothyroxine (SYNTHROID) 88 MCG tablet Take 1 tablet by mouth once daily (Patient taking differently: Take 88 mcg by mouth before breakfast.) 90 tablet 3    losartan (COZAAR) 25 MG tablet Take 1 tablet (25 mg total) by mouth 2 (two) times a day. (Patient taking differently: Take 25 mg by mouth once daily.) 180 tablet 3    magnesium oxide (MAG-OX) 400 mg (241.3 mg magnesium) tablet Take 1 tablet by mouth once daily (Patient taking differently: Take 400 mg by mouth once daily.) 90 tablet 3    montelukast (SINGULAIR) 10 mg tablet Take 1 tablet (10 mg total) by mouth every evening. 90 tablet 3    multivitamin (THERAGRAN) tablet Take 1 tablet by mouth once daily.      omeprazole (PRILOSEC) 40 MG capsule Take 1 capsule by mouth once daily (Patient taking differently: Take 40 mg by mouth once daily.) 90 capsule 3    polyethylene glycol (GLYCOLAX) 17 gram/dose powder Take 17 g by mouth 2 (two) times a day.      rosuvastatin (CRESTOR) 20 MG tablet Take 1 tablet (20 mg total) by mouth once daily. 90 tablet 3    sertraline (ZOLOFT) 25 MG tablet Take 1 tablet (25 mg total) by mouth once daily. 90 tablet 3    tamsulosin (FLOMAX) 0.4 mg Cap Take 1 capsule (0.4 mg total) by  mouth once daily. 30 capsule 0    vitamin D 1000 units Tab Take 1 tablet (1,000 Units total) by mouth once daily. 90 tablet 3    albuterol (PROVENTIL) 2.5 mg /3 mL (0.083 %) nebulizer solution USE 1 VIAL IN NEBULIZER EVERY 6 HOURS AS NEEDED FOR WHEEZING. RESCUE (Patient taking differently: Take 2.5 mg by nebulization every 6 (six) hours as needed for Wheezing or Shortness of Breath. USE 1 VIAL IN NEBULIZER EVERY 6 HOURS AS NEEDED FOR WHEEZING. RESCUE) 300 mL 3    meclizine (ANTIVERT) 25 mg tablet Take 1 tablet (25 mg total) by mouth 3 (three) times daily as needed. 20 tablet prn    nitroGLYCERIN (NITROSTAT) 0.4 MG SL tablet Place 1 tablet (0.4 mg total) under the tongue every 5 (five) minutes as needed for Chest pain. As needed (Patient taking differently: Place 0.4 mg under the tongue every 5 (five) minutes as needed for Chest pain. Seek medical help if pain is not relieved after the third dose.) 30 tablet 3       SCHEDULED MEDS:   albuterol sulfate  2.5 mg Nebulization Q6H WAKE    allopurinoL  50 mg Oral Every other day    amLODIPine  5 mg Oral Daily    ascorbic acid (vitamin C)  500 mg Oral Daily    atorvastatin  40 mg Oral QHS    calcium carbonate  500 mg Oral BID    ferrous sulfate  1 tablet Oral Daily    fluticasone furoate-vilanteroL  1 puff Inhalation Daily    fluticasone propionate  2 spray Each Nostril Daily    heparin (porcine)  5,000 Units Subcutaneous Q12H    levothyroxine  88 mcg Oral Before breakfast    montelukast  10 mg Oral Daily    pantoprazole  40 mg Oral Before breakfast    sertraline  25 mg Oral Daily    vitamin D  1,000 Units Oral Daily       CONTINUOUS INFUSIONS:   sodium chloride 0.45% 75 mL/hr at 03/24/22 0507    nicardipine Stopped (03/22/22 0436)       PRN MEDS:albuterol-ipratropium, calcium chloride IVPB, calcium chloride IVPB, calcium chloride IVPB, calcium chloride IVPB, magnesium oxide, magnesium sulfate IVPB, magnesium sulfate IVPB, magnesium sulfate IVPB,  magnesium sulfate IVPB, meclizine, potassium chloride, potassium chloride, potassium chloride, potassium chloride, sodium chloride 0.9%, traMADoL    LABS AND DIAGNOSTICS     CBC LAST 3 DAYS  Recent Labs   Lab 03/21/22  1400 03/22/22  0627   WBC 6.25 5.99   RBC 3.11* 2.79*   HGB 9.8* 8.8*   HCT 31.7* 28.2*   * 101*   MCH 31.5* 31.5*   MCHC 30.9* 31.2*   RDW 15.3* 14.9*   * 160   MPV 11.3 11.6   GRAN 62.1  3.9 56.9  3.4   LYMPH 23.4  1.5 25.9  1.6   MONO 8.8  0.6 9.7  0.6   BASO 0.04 0.04   NRBC 0 0       COAGULATION LAST 3 DAYS  No results for input(s): LABPT, INR, APTT in the last 168 hours.    CHEMISTRY LAST 3 DAYS  Recent Labs   Lab 03/21/22  1400 03/22/22  0627 03/23/22  0541 03/24/22  0436    140 143 140   K 3.8 3.7 3.7 4.0    109 105 107   CO2 25 25 26 25   ANIONGAP 8 6* 12 8   BUN 34* 34* 38* 38*   CREATININE 1.6* 1.9* 2.1* 1.8*   * 110 107 109   CALCIUM 8.1* 7.8* 8.1* 8.3*   MG 1.8  --   --   --    ALBUMIN 2.9*  --   --   --    PROT 5.3*  --   --   --    ALKPHOS 90  --   --   --    ALT 51*  --   --   --    AST 61*  --   --   --    BILITOT 0.5  --   --   --        CARDIAC PROFILE LAST 3 DAYS  Recent Labs   Lab 03/21/22  1400   *   TROPONINI <0.030       ENDOCRINE LAST 3 DAYS  Recent Labs   Lab 03/22/22  1000   TSH 2.510       LAST ARTERIAL BLOOD GAS  ABG  No results for input(s): PH, PO2, PCO2, HCO3, BE in the last 168 hours.    LAST 7 DAYS MICROBIOLOGY   Microbiology Results (last 7 days)     ** No results found for the last 168 hours. **          MOST RECENT IMAGING  Echo  · The estimated PA systolic pressure is 43 mmHg.  · The left ventricle is normal in size with mild concentric hypertrophy   and normal systolic function.  · The estimated ejection fraction is 65%.  · Grade II left ventricular diastolic dysfunction.  · Normal right ventricular size with normal right ventricular systolic   function.  · Moderate mitral regurgitation.  · There is moderate mitral  stenosis.  · There is moderate mitral leaflet calcification.  · Mild tricuspid regurgitation.  · There is mild pulmonary hypertension.  · There is mild aortic valve stenosis.  · Aortic valve area is 1.77 cm2; peak velocity is 2.08 m/s; mean gradient   is 11 mmHg.  · Moderate left atrial enlargement.  · Mild right atrial enlargement.         ECHOCARDIOGRAM RESULTS (last 5)  Results for orders placed during the hospital encounter of 10/19/21    Echo    Interpretation Summary  · The estimated PA systolic pressure is 44 mmHg.  · The left ventricle is normal in size with severe concentric hypertrophy and normal systolic function.  · The estimated ejection fraction is 60%.  · Grade II left ventricular diastolic dysfunction.  · Mild right ventricular enlargement.  · Mild left atrial enlargement.  · Mild to moderate tricuspid regurgitation.  · Mild-to-moderate mitral regurgitation.  · Normal central venous pressure (3 mmHg).      Results for orders placed during the hospital encounter of 05/07/20    Echo Color Flow Doppler? Yes; Bubble Contrast? Yes    Interpretation Summary  · Concentric left ventricular hypertrophy.  · The mean diastolic gradient across the mitral valve is 5 mmHg at a heart rate of 57 bpm.  · Normal left ventricular systolic function. The estimated ejection fraction is 65%.  · Grade II (moderate) left ventricular diastolic dysfunction consistent with pseudonormalization.  · Normal right ventricular systolic function.  · Severe left atrial enlargement.  · Mild right atrial enlargement.  · Mild mitral regurgitation.  · Mild to moderate tricuspid regurgitation.  · Normal central venous pressure (3 mmHg).  · The estimated PA systolic pressure is 50 mmHg.  · Pulmonary hypertension present.  · No PFO on color flow or saline bubble study      CURRENT/PREVIOUS VISIT EKG  Results for orders placed or performed during the hospital encounter of 03/21/22   EKG and show to ED MD    Collection Time: 03/21/22  1:55 PM     Narrative    Test Reason : R55,    Vent. Rate : 071 BPM     Atrial Rate : 071 BPM     P-R Int : 174 ms          QRS Dur : 082 ms      QT Int : 430 ms       P-R-T Axes : 016 018 056 degrees     QTc Int : 467 ms    Normal sinus rhythm  Septal infarct (cited on or before 04-JUL-2021)  Abnormal ECG  When compared with ECG of 13-JAN-2022 14:15,  Nonspecific T wave abnormality no longer evident in Anterior leads  Confirmed by Gualberto De La Fuente MD ([id number]) on 3/22/2022 6:58:20 PM    Referred By: CELESTE   SELF           Confirmed By:Gualberto De La Fuente MD           ASSESSMENT/PLAN:     Active Hospital Problems    Diagnosis    *Vasovagal syncope    Hypertensive urgency    Chronic diastolic heart failure    Chronic respiratory failure with hypoxia    Chronic anemia    Diabetic neuropathy, type II diabetes mellitus    Type 2 diabetes mellitus with stage 4 chronic kidney disease       ASSESSMENT & PLAN:     1. Hypertensive emergency-improved  2. Syncope-loop recorder in Situ this was after bowel movement and vomiting consider vagal?  3. Stable 4.4 x 4.5 cm infrarenal abdominal aortic aneurysm  4. Diverticulosis  5. Labile blood pressure  6. Acute kidney injury probably secondary to IV Lasix  7. Thyroid dysfunction  8. Dyslipidemia  9. Diabetes  10. Diastolic dysfunction  11. Pulmonary hypertension   12. Anemia- Hg 8.8    RECOMMENDATIONS:      Nothing seen on loop interrogation  Add Clonidine 0.1 mg PO BID  Continue Amlodipine 5 mg daily  Encourge oral hydration  Increase activity if she remains stable she can be discharged home later today with OP follow up in 3 weeks time            Sadie Perez NP  [facility]  Department of Cardiology  Date of Service: 03/24/2022        I have personally interviewed and examined the patient, I have reviewed the Nurse Practitioner's history and physical, assessment, and plan. I agree with the findings and plan.      Tylor De La Fuente M.D.  [facility]  [facility]  Department of Cardiology  Date of Service: 03/24/2022  8:47 AM

## 2022-03-24 NOTE — PLAN OF CARE
Problem: Adult Inpatient Plan of Care  Goal: Plan of Care Review  Outcome: Ongoing, Progressing  Flowsheets (Taken 3/24/2022 0111)  Plan of Care Reviewed With: patient  Goal: Patient-Specific Goal (Individualized)  Outcome: Ongoing, Progressing  Flowsheets (Taken 3/24/2022 0111)  Anxieties, Fears or Concerns: patient stated none at this time  Individualized Care Needs: educated on plan of care, ADLs, Cont HR and SpO2 monitor  Goal: Absence of Hospital-Acquired Illness or Injury  Outcome: Ongoing, Progressing  Intervention: Identify and Manage Fall Risk  Flowsheets (Taken 3/24/2022 0111)  Safety Promotion/Fall Prevention:   assistive device/personal item within reach   bed alarm set   Fall Risk signage in place   nonskid shoes/socks when out of bed   pulse ox   room near unit station   side rails raised x 2   instructed to call staff for mobility  Intervention: Prevent Skin Injury  Flowsheets (Taken 3/24/2022 0111)  Body Position: position changed independently  Skin Protection:   adhesive use limited   tubing/devices free from skin contact   incontinence pads utilized  Intervention: Prevent and Manage VTE (Venous Thromboembolism) Risk  Flowsheets (Taken 3/24/2022 0111)  Activity Management: Rolling - L1  VTE Prevention/Management:   ambulation promoted   bleeding precations maintained   bleeding risk assessed   fluids promoted   ROM (active) performed  Range of Motion: active ROM (range of motion) encouraged  Intervention: Prevent Infection  Flowsheets (Taken 3/24/2022 0111)  Infection Prevention:   hand hygiene promoted   rest/sleep promoted   single patient room provided  Goal: Optimal Comfort and Wellbeing  Outcome: Ongoing, Progressing  Intervention: Monitor Pain and Promote Comfort  Flowsheets (Taken 3/24/2022 0111)  Pain Management Interventions:   care clustered   medication offered   pain management plan reviewed with patient/caregiver   pillow support provided   position adjusted   quiet environment  facilitated  Intervention: Provide Person-Centered Care  Flowsheets (Taken 3/24/2022 0111)  Trust Relationship/Rapport:   care explained   choices provided   emotional support provided   empathic listening provided   questions answered   questions encouraged   reassurance provided   thoughts/feelings acknowledged  Goal: Readiness for Transition of Care  Outcome: Ongoing, Progressing     Problem: Diabetes Comorbidity  Goal: Blood Glucose Level Within Targeted Range  Outcome: Ongoing, Progressing  Intervention: Monitor and Manage Glycemia  Flowsheets (Taken 3/24/2022 0111)  Glycemic Management: oral hydration promoted     Problem: Fall Injury Risk  Goal: Absence of Fall and Fall-Related Injury  Outcome: Ongoing, Progressing  Intervention: Identify and Manage Contributors  Flowsheets (Taken 3/24/2022 0111)  Self-Care Promotion: independence encouraged  Medication Review/Management: medications reviewed  Intervention: Promote Injury-Free Environment  Flowsheets (Taken 3/24/2022 0111)  Safety Promotion/Fall Prevention:   assistive device/personal item within reach   bed alarm set   Fall Risk signage in place   nonskid shoes/socks when out of bed   pulse ox   room near unit station   side rails raised x 2   instructed to call staff for mobility     Problem: Hypertension Comorbidity  Goal: Blood Pressure in Desired Range  Outcome: Ongoing, Progressing  Intervention: Maintain Blood Pressure Management  Flowsheets (Taken 3/24/2022 0111)  Syncope Management: position changed slowly  Medication Review/Management: medications reviewed     Problem: Syncope  Goal: Absence of Syncopal Symptoms  Outcome: Ongoing, Progressing  Intervention: Manage Effect of Syncopal Symptoms  Flowsheets (Taken 3/24/2022 0111)  Syncope Management: position changed slowly  Supportive Measures:   active listening utilized   decision-making supported   self-care encouraged  Fluids: offered

## 2022-03-24 NOTE — PLAN OF CARE
03/24/22 1434   Final Note   Assessment Type Final Discharge Note   Anticipated Discharge Disposition Home   What phone number can be called within the next 1-3 days to see how you are doing after discharge? [phone]   Post-Acute Status   Coverage PHN   Discharge Delays None known at this time     Chart and discharge orders reviewed.  Patient discharged home with no further case management needs.

## 2022-03-24 NOTE — CARE UPDATE
Continue breathing tx   03/24/22 0731   Patient Assessment/Suction   Level of Consciousness (AVPU) alert   Respiratory Effort Normal;Unlabored   Expansion/Accessory Muscles/Retractions no use of accessory muscles;expansion symmetric   All Lung Fields Breath Sounds clear;diminished   ESPERANZA Breath Sounds clear   LLL Breath Sounds diminished   RUL Breath Sounds clear   RML Breath Sounds diminished   RLL Breath Sounds diminished   Rhythm/Pattern, Respiratory unlabored;depth regular;pattern regular   Cough Frequency no cough   PRE-TX-O2   O2 Device (Oxygen Therapy) nasal cannula   $ Is the patient on Low Flow Oxygen? Yes   Flow (L/min) 2   SpO2 98 %   Pulse Oximetry Type Continuous   $ Pulse Oximetry - Multiple Charge Pulse Oximetry - Multiple   Aerosol Therapy   $ Aerosol Therapy Charges Aerosol Treatment   Daily Review of Necessity (SVN) completed   Respiratory Treatment Status (SVN) given   Treatment Route (SVN) mask;oxygen   Post Treatment Assessment (SVN) increased aeration   Signs of Intolerance (SVN) none   Inhaler   $ Inhaler Charges MDI (Metered Dose Inahler) Treatment;Mouth rinsed post treatment   Daily Review of Necessity (Inhaler) completed   Respiratory Treatment Status (Inhaler) given   Treatment Route (Inhaler) mouthpiece   Patient Position (Inhaler) HOB elevated   Post Treatment Assessment (Inhaler) congestion decreased;increased aeration   Signs of Intolerance (Inhaler) none   Breath Sounds Post-Respiratory Treatment   Throughout All Fields Post-Treatment ESPERANZA;RUL   Throughout All Fields Post-Treatment wheezes, expiratory;diminished   ESPERANZA Post-Treatment wheezes, expiratory   RUL Post-Treatment wheezes, expiratory   Post-treatment Heart Rate (beats/min) 68   Post-treatment Resp Rate (breaths/min) 18   Education   $ Education 15 min;Bronchodilator   Respiratory Evaluation   $ Care Plan Tech Time 15 min   $ Eval/Re-eval Charges Re-evaluation

## 2022-03-26 NOTE — ED PROVIDER NOTES
Encounter Date: 3/26/2022       History     Chief Complaint   Patient presents with    Shortness of Breath     Onset yesterday, worse this morning     89-year-old female with history of congestive heart failure, COPD, on intermittent oxygen therapy, anemia, aortic aneurysm, gastroesophageal reflux, arthritis, type 2 diabetes, previous pulmonary embolism, DVT.  Patient presents emergency department with complaint of shortness of breath wheezing which was noted this morning.  Per EMS upon arrival patient found with room air sats of 81-82%.  Patient did have accessory muscle usage as well.  Denied fever, no chest pain, no vomiting, no abdominal pain, no other constitutional symptoms.  Patient was given pre-hospital treatment which included Solu-Medrol 125 mg IV x1 dual neb as well as albuterol.  Patient was placed on initial 4 L nasal cannula with improvement room air sats to 95%.  Transported further evaluation.        Review of patient's allergies indicates:   Allergen Reactions    Carvedilol Other (See Comments)     Bradycardia and syncope    Boniva [ibandronate]     Codeine     Hydralazine analogues     Iodinated contrast media Hives    Kionex [sodium polystyrene sulfonate] Diarrhea     From encounter 12/11/17 for diarrhea--not true allergy.    Lisinopril     Morphine      Past Medical History:   Diagnosis Date    Anemia due to multiple mechanisms 6/29/2018    Anemia, chronic disease 6/29/2018    Anemia, chronic renal failure, stage 3 (moderate) 6/29/2018    Anticoagulant long-term use     aspirin    Aortic aneurysm     Chest pain     CHF (congestive heart failure)     COPD (chronic obstructive pulmonary disease)     COPD with acute exacerbation 1/9/2015    Depression     Diabetes mellitus type II     no longer on any DM meds    DVT (deep venous thrombosis) 06/09/2018    Encounter for blood transfusion     GERD (gastroesophageal reflux disease)     GI bleed     Gout     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    Hip arthritis 3/1/2016    Homocysteinemia 8/7/2018    Hyperlipidemia     Hypertension     Incontinent of urine     Normocytic normochromic anemia 6/29/2018    Oxygen dependent     use oxygen as needed    PE (pulmonary thromboembolism) 06/09/2018    Pneumonia of right lower lobe due to infectious organism 9/11/2017    Syncope     Thyroid disease     hypothyroid    Type 2 diabetes mellitus with stage 3 chronic kidney disease 1/18/2013    UTI (urinary tract infection)      Past Surgical History:   Procedure Laterality Date    APPENDECTOMY      CHOLECYSTECTOMY      COLONOSCOPY Left 10/29/2020    Procedure: COLONOSCOPY;  Surgeon: Singh Kee III, MD;  Location: [facility];  Service: Endoscopy;  Laterality: Left;    ESOPHAGOGASTRODUODENOSCOPY Left 10/26/2020    Procedure: EGD (ESOPHAGOGASTRODUODENOSCOPY);  Surgeon: Kareem Gramajo MD;  Location: [facility];  Service: Endoscopy;  Laterality: Left;    ESOPHAGOGASTRODUODENOSCOPY Left 10/28/2020    Procedure: EGD (ESOPHAGOGASTRODUODENOSCOPY);  Surgeon: Kareem Gramajo MD;  Location: [facility];  Service: Endoscopy;  Laterality: Left;    ESOPHAGOGASTRODUODENOSCOPY N/A 9/16/2021    Procedure: EGD (ESOPHAGOGASTRODUODENOSCOPY);  Surgeon: Kareem Gramajo MD;  Location: [facility];  Service: Endoscopy;  Laterality: N/A;    FRACTURE SURGERY      right hip     HERNIA REPAIR      groin    HYSTERECTOMY      INSERTION OF IMPLANTABLE LOOP RECORDER N/A 12/12/2018    Procedure: Insertion, Implantable Loop Recorder;  Surgeon: Ashok Herbert MD;  Location: [facility] CATH LAB;  Service: Cardiology;  Laterality: N/A;    PERCUTANEOUS PINNING OF HIP Left 3/23/2019    Procedure: PINNING, HIP, PERCUTANEOUS;  Surgeon: Jorje Hamlin MD;  Location: [facility] OR;  Service: Orthopedics;  Laterality: Left;    SMALL BOWEL ENTEROSCOPY N/A 10/29/2020    Procedure: ENTEROSCOPY;  Surgeon: Singh Kee III, MD;  Location: [facility];  Service:  Endoscopy;  Laterality: N/A;    VARICOSE VEIN SURGERY       Family History   Problem Relation Age of Onset    Hypertension Mother     Heart disease Mother     Lung disease Father     Diabetes Sister     Diabetes Brother     Kidney disease Son      Social History     Tobacco Use    Smoking status: Former Smoker     Packs/day: 0.35     Years: 44.00     Pack years: 15.40     Types: Cigarettes     Start date: 1970    Smokeless tobacco: Never Used    Tobacco comment: 1/08/2015   Substance Use Topics    Alcohol use: No     Alcohol/week: 0.0 standard drinks    Drug use: No     Review of Systems   Constitutional: Negative for fever.   HENT: Negative for sore throat.    Respiratory: Positive for shortness of breath and wheezing. Negative for cough.    Cardiovascular: Negative for chest pain.   Gastrointestinal: Negative for abdominal pain, nausea and vomiting.   Genitourinary: Negative for dysuria.   Musculoskeletal: Negative for arthralgias, back pain and myalgias.   Skin: Negative for rash.   Neurological: Negative for weakness.   Hematological: Does not bruise/bleed easily.       Physical Exam     Initial Vitals [03/26/22 0837]   BP Pulse Resp Temp SpO2   (!) 181/74 70 (!) 24 98.4 °F (36.9 °C) (!) 93 %      MAP       --         Physical Exam    Nursing note and vitals reviewed.  Constitutional: She appears well-developed and well-nourished.   Elderly appearing female with mild conversational dyspnea   HENT:   Head: Normocephalic and atraumatic.   Nose: Nose normal.   Mouth/Throat: Oropharynx is clear and moist.   Eyes: Conjunctivae and EOM are normal. Pupils are equal, round, and reactive to light.   Neck: Neck supple. No thyromegaly present. No tracheal deviation present.   Normal range of motion.  Cardiovascular: Normal rate, regular rhythm, normal heart sounds and intact distal pulses. Exam reveals no gallop and no friction rub.    No murmur heard.  Pulmonary/Chest: No stridor. No respiratory distress. She  has no rhonchi. She has no rales.   Course breath sounds with scant wheezing noted throughout lung fields.   Abdominal: Abdomen is soft. Bowel sounds are normal. She exhibits no mass. There is no rebound and no guarding.   Musculoskeletal:         General: No edema. Normal range of motion.      Cervical back: Normal range of motion and neck supple.     Lymphadenopathy:     She has no cervical adenopathy.   Neurological: She is alert and oriented to person, place, and time. She has normal strength and normal reflexes. GCS score is 15. GCS eye subscore is 4. GCS verbal subscore is 5. GCS motor subscore is 6.   Skin: Skin is warm and dry. Capillary refill takes less than 2 seconds.   Psychiatric: She has a normal mood and affect.         ED Course   Procedures  Labs Reviewed   CBC W/ AUTO DIFFERENTIAL - Abnormal; Notable for the following components:       Result Value    RBC 2.94 (*)     Hemoglobin 9.4 (*)     Hematocrit 29.7 (*)      (*)     MCH 32.0 (*)     MCHC 31.6 (*)     RDW 14.8 (*)     Platelets 143 (*)     All other components within normal limits   COMPREHENSIVE METABOLIC PANEL - Abnormal; Notable for the following components:    BUN 44 (*)     Creatinine 1.9 (*)     Calcium 8.3 (*)     Total Protein 5.2 (*)     Albumin 2.7 (*)     Anion Gap 4 (*)     eGFR if  26.2 (*)     eGFR if non  22.7 (*)     All other components within normal limits   B-TYPE NATRIURETIC PEPTIDE - Abnormal; Notable for the following components:     (*)     All other components within normal limits   TROPONIN I   SARS-COV-2 RNA AMPLIFICATION, QUAL   CK   CK-MB   TROPONIN I   OCCULT BLOOD X 1, STOOL        ECG Results          EKG 12-lead (Final result)  Result time 03/26/22 09:54:57    Final result by Interface, Lab In [facility] (03/26/22 09:54:57)                 Narrative:    Test Reason : R07.9,    Vent. Rate : 062 BPM     Atrial Rate : 062 BPM     P-R Int : 188 ms          QRS Dur : 094  ms      QT Int : 446 ms       P-R-T Axes : 050 -06 059 degrees     QTc Int : 452 ms    Normal sinus rhythm with sinus arrhythmia  Normal ECG  When compared with ECG of 21-MAR-2022 13:55,  No significant change was found  Confirmed by Luis Carlos Chaudhari MD ([id number]) on 3/26/2022 9:54:45 AM    Referred By: [email or web address]   SELF           Confirmed By:Luis Carlos Chaudhari MD                            Imaging Results          X-Ray Chest AP Portable (Final result)  Result time 03/26/22 09:26:44    Final result by Roc Nation Jr., MD (03/26/22 09:26:44)                 Narrative:    Examination: 1V chest.    CLINICAL HISTORY: Chest pain and shortness of breath.    TECHNIQUE: AP view the chest.    COMPARISON: 3/21/2022.    FINDINGS: Loop recorder projects over the left infrahilar region. Heart remains prominent side with mild reticular changes.. Lower lobe interstitial pulmonary scar formation and linear pulmonic scarring contacting the pleural surface in the right lung base. Partial subsegmental atelectasis in the inferior right lung base. Lungs are slightly emphysematous. No evidence of lobar or sublobar consolidation. Left-sided pleural effusion has reduced in volume with trace elements in the left costophrenic angle. No significant pneumothorax. The upper mediastinum not widened.    IMPRESSION:  1. Left-sided pleural effusion has reduced in volume trace fluid occupying the left costophrenic angle.  2. Bilateral lower lobe pulmonary scar formation pronounced changes in the right lung base contacting the visceral pleura.    Electronically signed by:  Roc Nation MD  3/26/2022 9:26 AM CDT Workstation: [id number]                               Medications   albuterol inhaler 2 puff (2 puffs Inhalation Given 3/26/22 0954)     Medical Decision Making:   Initial Assessment:   89-year-old female with history of congestive heart failure, COPD, on intermittent oxygen therapy, anemia, aortic aneurysm, gastroesophageal reflux,  arthritis, type 2 diabetes, previous pulmonary embolism, DVT.  Patient presents emergency department with complaint of shortness of breath wheezing which was noted this morning.  Per EMS upon arrival patient found with room air sats of 81-82%.  Patient did have accessory muscle usage as well.  Denied fever, no chest pain, no vomiting, no abdominal pain, no other constitutional symptoms.  Patient was given pre-hospital treatment which included Solu-Medrol 125 mg IV x1 dual neb as well as albuterol.  Patient was placed on initial 4 L nasal cannula with improvement room air sats to 95%.  Transported further evaluation.        Differential Diagnosis:   Pneumonia, COPD exacerbation, congestive heart failure, URI, viral syndrome  Clinical Tests:   Lab Tests: Ordered and Reviewed  Radiological Study: Ordered and Reviewed  Medical Tests: Ordered and Reviewed                      Clinical Impression:   Final diagnoses:  [J44.1] COPD exacerbation (Primary)  [R09.02] Hypoxia          ED Disposition Condition    Admit               Gustavo Alvarado MD  03/26/22 3574

## 2022-03-26 NOTE — NURSING
Patient arrived on unit via bed in stable condition. Oxygen in place via nasal cannula. Family at bedside. Call light within reach.

## 2022-03-26 NOTE — SUBJECTIVE & OBJECTIVE
Past Medical History:   Diagnosis Date    Anemia due to multiple mechanisms 6/29/2018    Anemia, chronic disease 6/29/2018    Anemia, chronic renal failure, stage 3 (moderate) 6/29/2018    Anticoagulant long-term use     aspirin    Aortic aneurysm     Chest pain     CHF (congestive heart failure)     COPD (chronic obstructive pulmonary disease)     COPD with acute exacerbation 1/9/2015    Depression     Diabetes mellitus type II     no longer on any DM meds    DVT (deep venous thrombosis) 06/09/2018    Encounter for blood transfusion     GERD (gastroesophageal reflux disease)     GI bleed     Gout     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 8/7/2018    Hip arthritis 3/1/2016    Homocysteinemia 8/7/2018    Hyperlipidemia     Hypertension     Incontinent of urine     Normocytic normochromic anemia 6/29/2018    Oxygen dependent     use oxygen as needed    PE (pulmonary thromboembolism) 06/09/2018    Pneumonia of right lower lobe due to infectious organism 9/11/2017    Syncope     Thyroid disease     hypothyroid    Type 2 diabetes mellitus with stage 3 chronic kidney disease 1/18/2013    UTI (urinary tract infection)        Past Surgical History:   Procedure Laterality Date    APPENDECTOMY      CHOLECYSTECTOMY      COLONOSCOPY Left 10/29/2020    Procedure: COLONOSCOPY;  Surgeon: Singh Kee III, MD;  Location: [facility];  Service: Endoscopy;  Laterality: Left;    ESOPHAGOGASTRODUODENOSCOPY Left 10/26/2020    Procedure: EGD (ESOPHAGOGASTRODUODENOSCOPY);  Surgeon: Kareem Gramajo MD;  Location: [facility];  Service: Endoscopy;  Laterality: Left;    ESOPHAGOGASTRODUODENOSCOPY Left 10/28/2020    Procedure: EGD (ESOPHAGOGASTRODUODENOSCOPY);  Surgeon: Kareem Gramajo MD;  Location: [facility];  Service: Endoscopy;  Laterality: Left;    ESOPHAGOGASTRODUODENOSCOPY N/A 9/16/2021    Procedure: EGD (ESOPHAGOGASTRODUODENOSCOPY);  Surgeon: Kareem Gramajo MD;  Location: [facility] ENDO;  Service: Endoscopy;  Laterality: N/A;    FRACTURE  SURGERY      right hip     HERNIA REPAIR      groin    HYSTERECTOMY      INSERTION OF IMPLANTABLE LOOP RECORDER N/A 12/12/2018    Procedure: Insertion, Implantable Loop Recorder;  Surgeon: Ashok Herbert MD;  Location: [facility] CATH LAB;  Service: Cardiology;  Laterality: N/A;    PERCUTANEOUS PINNING OF HIP Left 3/23/2019    Procedure: PINNING, HIP, PERCUTANEOUS;  Surgeon: Jorje Hamlin MD;  Location: [facility] OR;  Service: Orthopedics;  Laterality: Left;    SMALL BOWEL ENTEROSCOPY N/A 10/29/2020    Procedure: ENTEROSCOPY;  Surgeon: Singh Kee III, MD;  Location: [facility] ENDO;  Service: Endoscopy;  Laterality: N/A;    VARICOSE VEIN SURGERY         Review of patient's allergies indicates:   Allergen Reactions    Carvedilol Other (See Comments)     Bradycardia and syncope    Boniva [ibandronate]     Codeine     Hydralazine analogues     Iodinated contrast media Hives    Kionex [sodium polystyrene sulfonate] Diarrhea     From encounter 12/11/17 for diarrhea--not true allergy.    Lisinopril     Morphine        No current facility-administered medications on file prior to encounter.     Current Outpatient Medications on File Prior to Encounter   Medication Sig    acetaminophen (TYLENOL) 325 MG tablet Take 325 mg by mouth every 6 (six) hours as needed for Pain.    albuterol (PROVENTIL) 2.5 mg /3 mL (0.083 %) nebulizer solution USE 1 VIAL IN NEBULIZER EVERY 6 HOURS AS NEEDED FOR WHEEZING. RESCUE (Patient taking differently: Take 2.5 mg by nebulization every 6 (six) hours as needed for Wheezing or Shortness of Breath. USE 1 VIAL IN NEBULIZER EVERY 6 HOURS AS NEEDED FOR WHEEZING. RESCUE)    allopurinoL (ZYLOPRIM) 100 MG tablet Take 100 mg by mouth once daily.    ascorbic acid, vitamin C, (VITAMIN C) 500 MG tablet Take 500 mg by mouth once daily.    calcium carbonate (OS-TASIA) 500 mg calcium (1,250 mg) tablet Take 1 tablet by mouth 2 (two) times daily.    cloNIDine (CATAPRES) 0.1 MG tablet Take 1 tablet (0.1 mg total) by  mouth 2 (two) times daily.    diclofenac sodium (VOLTAREN) 1 % Gel Apply 2 g topically once daily. (Patient taking differently: Apply 2 g topically as needed.)    diltiaZEM (CARDIZEM CD) 180 MG 24 hr capsule Take 1 capsule (180 mg total) by mouth once daily.    ferrous sulfate (FEOSOL) 325 mg (65 mg iron) Tab tablet Take 325 mg by mouth once daily.    fish oil-omega-3 fatty acids 300-1,000 mg capsule Take 2 g by mouth every evening.    fluticasone (FLONASE) 50 mcg/actuation nasal spray 2 sprays by Each Nare route once daily. (Patient taking differently: 2 sprays by Each Nostril route as needed.)    fluticasone-umeclidin-vilanter (TRELEGY ELLIPTA) 200-62.5-25 mcg inhaler Inhale 1 puff into the lungs once daily.    furosemide (LASIX) 20 MG tablet Take 1 tablet (20 mg total) by mouth as needed. Take 1 tablet only as needed, for swelling, increase SOB, weight gain of 3 lbs overnight or 5 lbs for the week    levothyroxine (SYNTHROID) 88 MCG tablet Take 1 tablet by mouth once daily (Patient taking differently: Take 88 mcg by mouth before breakfast.)    magnesium oxide (MAG-OX) 400 mg (241.3 mg magnesium) tablet Take 1 tablet by mouth once daily (Patient taking differently: Take 400 mg by mouth once daily.)    meclizine (ANTIVERT) 25 mg tablet Take 1 tablet (25 mg total) by mouth 3 (three) times daily as needed.    montelukast (SINGULAIR) 10 mg tablet Take 1 tablet (10 mg total) by mouth every evening.    multivitamin (THERAGRAN) tablet Take 1 tablet by mouth once daily.    omeprazole (PRILOSEC) 40 MG capsule Take 1 capsule by mouth once daily (Patient taking differently: Take 40 mg by mouth once daily.)    polyethylene glycol (GLYCOLAX) 17 gram/dose powder Take 17 g by mouth 2 (two) times a day.    rivaroxaban (XARELTO) 10 mg Tab Take 10 mg by mouth daily with dinner or evening meal. HCS    rosuvastatin (CRESTOR) 20 MG tablet Take 1 tablet (20 mg total) by mouth once daily.    sertraline (ZOLOFT) 25 MG tablet Take 1  tablet (25 mg total) by mouth once daily.    tamsulosin (FLOMAX) 0.4 mg Cap Take 1 capsule (0.4 mg total) by mouth once daily.    vitamin D 1000 units Tab Take 1 tablet (1,000 Units total) by mouth once daily.    folic acid-vit B6-vit B12 2.5-25-2 mg (FOLBIC) 2.5-25-2 mg Tab Take 1 tablet by mouth once daily.    nitroGLYCERIN (NITROSTAT) 0.4 MG SL tablet Place 1 tablet (0.4 mg total) under the tongue every 5 (five) minutes as needed for Chest pain. As needed (Patient taking differently: Place 0.4 mg under the tongue every 5 (five) minutes as needed for Chest pain. Seek medical help if pain is not relieved after the third dose.)    [DISCONTINUED] ketoconazole (NIZORAL) 2 % shampoo Apply topically twice a week. (Patient taking differently: Apply 1 application topically twice a week.)    [DISCONTINUED] losartan (COZAAR) 25 MG tablet Take 1 tablet (25 mg total) by mouth 2 (two) times a day. (Patient taking differently: Take 25 mg by mouth once daily.)     Family History       Problem Relation (Age of Onset)    Diabetes Sister, Brother    Heart disease Mother    Hypertension Mother    Kidney disease Son    Lung disease Father          Tobacco Use    Smoking status: Former Smoker     Packs/day: 0.35     Years: 44.00     Pack years: 15.40     Types: Cigarettes     Start date: 1970    Smokeless tobacco: Never Used    Tobacco comment: 1/08/2015   Substance and Sexual Activity    Alcohol use: No     Alcohol/week: 0.0 standard drinks    Drug use: No    Sexual activity: Not Currently     Review of Systems   Constitutional:  Negative for activity change and fever.   Respiratory:  Positive for shortness of breath. Negative for wheezing.    Cardiovascular:  Negative for chest pain and leg swelling.   Gastrointestinal:  Negative for abdominal distention and abdominal pain.   Genitourinary:  Negative for difficulty urinating.   Skin:  Negative for color change.   Neurological:  Negative for dizziness and facial asymmetry.    Psychiatric/Behavioral:  Negative for agitation and confusion.    All other systems reviewed and are negative.  Objective:     Vital Signs (Most Recent):  Temp: 97.5 °F (36.4 °C) (03/26/22 1710)  Pulse: 70 (03/26/22 1710)  Resp: 20 (03/26/22 1710)  BP: (!) 171/72 (03/26/22 1710)  SpO2: 97 % (03/26/22 1710)   Vital Signs (24h Range):  Temp:  [97.5 °F (36.4 °C)-98.4 °F (36.9 °C)] 97.5 °F (36.4 °C)  Pulse:  [59-73] 70  Resp:  [16-25] 20  SpO2:  [93 %-100 %] 97 %  BP: (156-181)/(71-77) 171/72     Weight: 51.7 kg (113 lb 15.7 oz)  Body mass index is 20.19 kg/m².    Physical Exam  Vitals and nursing note reviewed.   HENT:      Head: Normocephalic and atraumatic.      Nose: Nose normal.      Mouth/Throat:      Mouth: Mucous membranes are moist.   Eyes:      Conjunctiva/sclera: Conjunctivae normal.   Neck:      Vascular: No JVD.   Cardiovascular:      Rate and Rhythm: Normal rate.      Heart sounds: Normal heart sounds.   Pulmonary:      Effort: Pulmonary effort is normal.      Breath sounds: Normal breath sounds.   Abdominal:      General: Abdomen is flat.      Palpations: Abdomen is soft.      Tenderness: There is abdominal tenderness.   Genitourinary:     General: Normal vulva.   Musculoskeletal:         General: Normal range of motion.      Cervical back: Normal range of motion.   Skin:     General: Skin is warm.   Neurological:      General: No focal deficit present.      Mental Status: She is alert and oriented to person, place, and time.   Psychiatric:         Mood and Affect: Mood normal.           Significant Labs: All pertinent labs within the past 24 hours have been reviewed.  CBC:   Recent Labs   Lab 03/26/22  0906   WBC 6.25   HGB 9.4*   HCT 29.7*   *     CMP:   Recent Labs   Lab 03/26/22  0906      K 3.9      CO2 29      BUN 44*   CREATININE 1.9*   CALCIUM 8.3*   PROT 5.2*   ALBUMIN 2.7*   BILITOT 0.6   ALKPHOS 78   AST 31   ALT 31   ANIONGAP 4*   EGFRNONAA 22.7*     Cardiac  Markers:   Recent Labs   Lab 03/26/22  0906   *       Significant Imaging: I have reviewed all pertinent imaging results/findings within the past 24 hours.    CT Abdomen Pelvis  Without Contrast    Status: Final result         [employer] Results Release    [employer] Status: Active (w/ proxy users)  Results Release           PACS Images for [employer] [place] Viewer     Show images for CT Abdomen Pelvis Without Contrast                  CT Abdomen Pelvis  Without Contrast  Order: [id number]  Status: Final result    Visible to patient: Yes (seen)    Next appt: 03/28/2022 at 09:20 AM in Family Medicine (Eli Edmonds MD)    0 Result Notes    Details    Reading Physician Reading Date Result Priority   Roc Nation Jr., MD  [phone] 3/26/2022      Narrative & Impression  EXAM: CT ABDOMEN AND PELVIS WITHOUT CONTRAST CT ABDOMEN PELVIS WITHOUT CONTRAST     CLINICAL INDICATION: Female, 89 years old. abdominal pain     TECHNIQUE: CT abdomen and pelvis was performed, without IV contrast, as per department protocol. Axial, sagittal, and coronal reconstructions were obtained.     IV CONTRAST: Not administered, limiting sensitivity of this exam for evaluation of solid visceral organs, vascular structures, and retroperitoneum.     ORAL CONTRAST: Not administered, limiting sensitivity of this exam for evaluation of bowel, retroperitoneum, and intraabdominal fluid collections.     RADIATION DOSE REDUCTION: This exam was performed according to the departmental dose-optimization program which includes automated exposure control, adjustment of the mA and/or kV according to patient size and/or use of iterative reconstruction technique.     COMPARISON: None     FINDINGS:  LOWER CHEST:  Mild bronchiectasis of the bronchus supplying the lower lungs bilaterally. Small tree-in-bud opacities about the peripheral edge of the right lower lobe. Significant parenchymal scarring contacting the pleural surface in the right lower lobe.  Patchy areas of groundglass opacity in the right middle lobe near the cardiophrenic sulcus noted, peripherally. Small layering pleural effusion the left pleural cavity. Tortuous descending thoracic aorta. Global cardiomegaly. Calcification across the mitral valve plane. Mild anterior pericardial effusion.     LIVER:  No pathologic process.     GALLBLADDER:  Gallbladder has been surgically resected.     BILE DUCTS:  No pathologic process.     PANCREAS:  No pathologic process.     SPLEEN:  No pathologic process.     ADRENALS:  No pathologic process.     KIDNEYS AND URETERS:  Mild degenerative changes involving the kidneys. Dense uniform 8 mm calcification central hilum of left kidney.     URINARY BLADDER:     Is grossly normal in CT appearance. Prominent streak artifact arising the patient's operative hardware.     GASTROINTESTINAL TRACT:  Small large bowel loops appear normal caliber. Moderate fecal load throughout the entire colon. Findings may lead to clinical diagnosis of constipation.     APPENDIX:  No inflammatory changes in region of appendix.     LYMPH NODES:  No lymphadenopathy.     PERITONEUM/MESENTERY:  No free air, significant free fluid, mass or fluid collection.     VESSELS:  Aorta is ectatic deviated towards the left of midline. Large saccular aneurysm projecting towards the left of midline measuring 5.5 x 5.1 cm in cross-section with a vertical dimension of 5.0 cm terminating at the aortic bifurcation. Extensive plaque surrounding the aneurysmal segment. Segmental widening of the aorta extending into the bilateral common iliac arteries with fairly extensive atheromatous calcifications. No indication of impending rupture. No distinct intimal flap formation left in the unenhanced format.     ADDITIONAL RETROPERITONEAL FINDINGS:  None.     REPRODUCTIVE ORGANS: No pathologic process.     ABDOMINAL AND PELVIC WALLS:  No pathologic process.     MUSCULOSKELETAL:  Postoperative changes of previous operative  hardware deployed within bony pelvis with IM nailing on the left side and postoperative changes of orthopedic prosthesis on the right. The right hip in optimal alignment.     ADDITIONAL FINDINGS:  None.     IMPRESSION:     1. Ectasia of the descending abdominal aorta with saccular expansion towards the left midline measuring 5.1 x 5.5 cm in transverse width vertical dimension of 5.2 cm termination at the level of the aortic bifurcation projecting towards the left midline though without evidence of impending rupture or perianeurysmal hemorrhage.  2. Postoperative changes of previous gallbladder resection.  3. Regional areas of gravitational atelectasis and basilar scarring in the basilar segments with evidence of evidence of moderate parenchymal scarring on the right tracking towards the posterior edge of the right pleural collection.

## 2022-03-26 NOTE — ED TRIAGE NOTES
Admit per [facility] to ED3, 89 yo female with hx COPD, CHF in with c/o SOB. Started last night, worse this morning. Room air sat 82% per EMS. Tx with O2 4L NC, Duoneb x 1 albuterol tx x 1. Solumedrol 125mg. Feels much better upon arrival to ED. EMS reports wheezing has decreaased. Pt denies CP.

## 2022-03-26 NOTE — H&P
[facility]  History & Physical    Patient Name: Blaire Cage  MRN: [id number]  Patient Class: OP- Observation  Admission Date: 3/26/2022  Attending Physician: Malik Fu MD   Primary Care Provider: Eli Edmonds MD         Patient information was obtained from patient and ER records.     Subjective:     Principal Problem:COPD exacerbation    Chief Complaint:   Chief Complaint   Patient presents with    Shortness of Breath     Onset yesterday, worse this morning        HPI: Blaire Cage is a 89 y.o. Black or  female with known history of   CKD stage 3 heart failure ejection fraction 55% hypertension COPD anemia diabetes DVT P used to be on Xarelto but was taken off with by physician according to family hypothyroidism COVID-19 in September 2021 chronic respiratory failure on 2 L nasal cannula gastric antral vascular ectasia, AAA being monitored as outpatient, recently DC with syncopal episode and HTN urgency came back with SOB and wheezing started this morning.    Per EMS upon arrival patient found with room air sats of 81-82 and given IV steroid and duoneb and brought to ER .  Denied fever, no chest pain, no vomiting, no abdominal pain, no other constitutional symptoms.    In ER , patient is stable near baseline but daughter report that occassional bleeding at the rectal /vaginal area and patient complaint of suprapubic pain and CT abdomen was done but no significant serious findings noted except slight enlargement of abdominal AAA.      Past Medical History:   Diagnosis Date    Anemia due to multiple mechanisms 6/29/2018    Anemia, chronic disease 6/29/2018    Anemia, chronic renal failure, stage 3 (moderate) 6/29/2018    Anticoagulant long-term use     aspirin    Aortic aneurysm     Chest pain     CHF (congestive heart failure)     COPD (chronic obstructive pulmonary disease)     COPD with acute exacerbation 1/9/2015    Depression     Diabetes  mellitus type II     no longer on any DM meds    DVT (deep venous thrombosis) 06/09/2018    Encounter for blood transfusion     GERD (gastroesophageal reflux disease)     GI bleed     Gout     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 8/7/2018    Hip arthritis 3/1/2016    Homocysteinemia 8/7/2018    Hyperlipidemia     Hypertension     Incontinent of urine     Normocytic normochromic anemia 6/29/2018    Oxygen dependent     use oxygen as needed    PE (pulmonary thromboembolism) 06/09/2018    Pneumonia of right lower lobe due to infectious organism 9/11/2017    Syncope     Thyroid disease     hypothyroid    Type 2 diabetes mellitus with stage 3 chronic kidney disease 1/18/2013    UTI (urinary tract infection)        Past Surgical History:   Procedure Laterality Date    APPENDECTOMY      CHOLECYSTECTOMY      COLONOSCOPY Left 10/29/2020    Procedure: COLONOSCOPY;  Surgeon: Singh Kee III, MD;  Location: [facility];  Service: Endoscopy;  Laterality: Left;    ESOPHAGOGASTRODUODENOSCOPY Left 10/26/2020    Procedure: EGD (ESOPHAGOGASTRODUODENOSCOPY);  Surgeon: Kareem Gramajo MD;  Location: [facility];  Service: Endoscopy;  Laterality: Left;    ESOPHAGOGASTRODUODENOSCOPY Left 10/28/2020    Procedure: EGD (ESOPHAGOGASTRODUODENOSCOPY);  Surgeon: Kareem Gramajo MD;  Location: [facility];  Service: Endoscopy;  Laterality: Left;    ESOPHAGOGASTRODUODENOSCOPY N/A 9/16/2021    Procedure: EGD (ESOPHAGOGASTRODUODENOSCOPY);  Surgeon: Kareem Gramajo MD;  Location: [facility];  Service: Endoscopy;  Laterality: N/A;    FRACTURE SURGERY      right hip     HERNIA REPAIR      groin    HYSTERECTOMY      INSERTION OF IMPLANTABLE LOOP RECORDER N/A 12/12/2018    Procedure: Insertion, Implantable Loop Recorder;  Surgeon: Ashok Herbert MD;  Location: [facility] CATH LAB;  Service: Cardiology;  Laterality: N/A;    PERCUTANEOUS PINNING OF HIP Left 3/23/2019    Procedure: PINNING, HIP, PERCUTANEOUS;  Surgeon: Jorje Victor  MD Boubacar;  Location: [facility];  Service: Orthopedics;  Laterality: Left;    SMALL BOWEL ENTEROSCOPY N/A 10/29/2020    Procedure: ENTEROSCOPY;  Surgeon: Singh Kee III, MD;  Location: [facility];  Service: Endoscopy;  Laterality: N/A;    VARICOSE VEIN SURGERY         Review of patient's allergies indicates:   Allergen Reactions    Carvedilol Other (See Comments)     Bradycardia and syncope    Boniva [ibandronate]     Codeine     Hydralazine analogues     Iodinated contrast media Hives    Kionex [sodium polystyrene sulfonate] Diarrhea     From encounter 12/11/17 for diarrhea--not true allergy.    Lisinopril     Morphine        No current facility-administered medications on file prior to encounter.     Current Outpatient Medications on File Prior to Encounter   Medication Sig    acetaminophen (TYLENOL) 325 MG tablet Take 325 mg by mouth every 6 (six) hours as needed for Pain.    albuterol (PROVENTIL) 2.5 mg /3 mL (0.083 %) nebulizer solution USE 1 VIAL IN NEBULIZER EVERY 6 HOURS AS NEEDED FOR WHEEZING. RESCUE (Patient taking differently: Take 2.5 mg by nebulization every 6 (six) hours as needed for Wheezing or Shortness of Breath. USE 1 VIAL IN NEBULIZER EVERY 6 HOURS AS NEEDED FOR WHEEZING. RESCUE)    allopurinoL (ZYLOPRIM) 100 MG tablet Take 100 mg by mouth once daily.    ascorbic acid, vitamin C, (VITAMIN C) 500 MG tablet Take 500 mg by mouth once daily.    calcium carbonate (OS-TASIA) 500 mg calcium (1,250 mg) tablet Take 1 tablet by mouth 2 (two) times daily.    cloNIDine (CATAPRES) 0.1 MG tablet Take 1 tablet (0.1 mg total) by mouth 2 (two) times daily.    diclofenac sodium (VOLTAREN) 1 % Gel Apply 2 g topically once daily. (Patient taking differently: Apply 2 g topically as needed.)    diltiaZEM (CARDIZEM CD) 180 MG 24 hr capsule Take 1 capsule (180 mg total) by mouth once daily.    ferrous sulfate (FEOSOL) 325 mg (65 mg iron) Tab tablet Take 325 mg by mouth once daily.    fish  oil-omega-3 fatty acids 300-1,000 mg capsule Take 2 g by mouth every evening.    fluticasone (FLONASE) 50 mcg/actuation nasal spray 2 sprays by Each Nare route once daily. (Patient taking differently: 2 sprays by Each Nostril route as needed.)    fluticasone-umeclidin-vilanter (TRELEGY ELLIPTA) 200-62.5-25 mcg inhaler Inhale 1 puff into the lungs once daily.    furosemide (LASIX) 20 MG tablet Take 1 tablet (20 mg total) by mouth as needed. Take 1 tablet only as needed, for swelling, increase SOB, weight gain of 3 lbs overnight or 5 lbs for the week    levothyroxine (SYNTHROID) 88 MCG tablet Take 1 tablet by mouth once daily (Patient taking differently: Take 88 mcg by mouth before breakfast.)    magnesium oxide (MAG-OX) 400 mg (241.3 mg magnesium) tablet Take 1 tablet by mouth once daily (Patient taking differently: Take 400 mg by mouth once daily.)    meclizine (ANTIVERT) 25 mg tablet Take 1 tablet (25 mg total) by mouth 3 (three) times daily as needed.    montelukast (SINGULAIR) 10 mg tablet Take 1 tablet (10 mg total) by mouth every evening.    multivitamin (THERAGRAN) tablet Take 1 tablet by mouth once daily.    omeprazole (PRILOSEC) 40 MG capsule Take 1 capsule by mouth once daily (Patient taking differently: Take 40 mg by mouth once daily.)    polyethylene glycol (GLYCOLAX) 17 gram/dose powder Take 17 g by mouth 2 (two) times a day.    rivaroxaban (XARELTO) 10 mg Tab Take 10 mg by mouth daily with dinner or evening meal. HCS    rosuvastatin (CRESTOR) 20 MG tablet Take 1 tablet (20 mg total) by mouth once daily.    sertraline (ZOLOFT) 25 MG tablet Take 1 tablet (25 mg total) by mouth once daily.    tamsulosin (FLOMAX) 0.4 mg Cap Take 1 capsule (0.4 mg total) by mouth once daily.    vitamin D 1000 units Tab Take 1 tablet (1,000 Units total) by mouth once daily.    folic acid-vit B6-vit B12 2.5-25-2 mg (FOLBIC) 2.5-25-2 mg Tab Take 1 tablet by mouth once daily.    nitroGLYCERIN (NITROSTAT) 0.4 MG  SL tablet Place 1 tablet (0.4 mg total) under the tongue every 5 (five) minutes as needed for Chest pain. As needed (Patient taking differently: Place 0.4 mg under the tongue every 5 (five) minutes as needed for Chest pain. Seek medical help if pain is not relieved after the third dose.)    [DISCONTINUED] ketoconazole (NIZORAL) 2 % shampoo Apply topically twice a week. (Patient taking differently: Apply 1 application topically twice a week.)    [DISCONTINUED] losartan (COZAAR) 25 MG tablet Take 1 tablet (25 mg total) by mouth 2 (two) times a day. (Patient taking differently: Take 25 mg by mouth once daily.)     Family History       Problem Relation (Age of Onset)    Diabetes Sister, Brother    Heart disease Mother    Hypertension Mother    Kidney disease Son    Lung disease Father          Tobacco Use    Smoking status: Former Smoker     Packs/day: 0.35     Years: 44.00     Pack years: 15.40     Types: Cigarettes     Start date: 1970    Smokeless tobacco: Never Used    Tobacco comment: 1/08/2015   Substance and Sexual Activity    Alcohol use: No     Alcohol/week: 0.0 standard drinks    Drug use: No    Sexual activity: Not Currently     Review of Systems   Constitutional:  Negative for activity change and fever.   Respiratory:  Positive for shortness of breath. Negative for wheezing.    Cardiovascular:  Negative for chest pain and leg swelling.   Gastrointestinal:  Negative for abdominal distention and abdominal pain.   Genitourinary:  Negative for difficulty urinating.   Skin:  Negative for color change.   Neurological:  Negative for dizziness and facial asymmetry.   Psychiatric/Behavioral:  Negative for agitation and confusion.    All other systems reviewed and are negative.  Objective:     Vital Signs (Most Recent):  Temp: 97.5 °F (36.4 °C) (03/26/22 1710)  Pulse: 70 (03/26/22 1710)  Resp: 20 (03/26/22 1710)  BP: (!) 171/72 (03/26/22 1710)  SpO2: 97 % (03/26/22 1710)   Vital Signs (24h Range):  Temp:   [97.5 °F (36.4 °C)-98.4 °F (36.9 °C)] 97.5 °F (36.4 °C)  Pulse:  [59-73] 70  Resp:  [16-25] 20  SpO2:  [93 %-100 %] 97 %  BP: (156-181)/(71-77) 171/72     Weight: 51.7 kg (113 lb 15.7 oz)  Body mass index is 20.19 kg/m².    Physical Exam  Vitals and nursing note reviewed.   HENT:      Head: Normocephalic and atraumatic.      Nose: Nose normal.      Mouth/Throat:      Mouth: Mucous membranes are moist.   Eyes:      Conjunctiva/sclera: Conjunctivae normal.   Neck:      Vascular: No JVD.   Cardiovascular:      Rate and Rhythm: Normal rate.      Heart sounds: Normal heart sounds.   Pulmonary:      Effort: Pulmonary effort is normal.      Breath sounds: Normal breath sounds.   Abdominal:      General: Abdomen is flat.      Palpations: Abdomen is soft.      Tenderness: There is abdominal tenderness.   Genitourinary:     General: Normal vulva.   Musculoskeletal:         General: Normal range of motion.      Cervical back: Normal range of motion.   Skin:     General: Skin is warm.   Neurological:      General: No focal deficit present.      Mental Status: She is alert and oriented to person, place, and time.   Psychiatric:         Mood and Affect: Mood normal.           Significant Labs: All pertinent labs within the past 24 hours have been reviewed.  CBC:   Recent Labs   Lab 03/26/22  0906   WBC 6.25   HGB 9.4*   HCT 29.7*   *     CMP:   Recent Labs   Lab 03/26/22  0906      K 3.9      CO2 29      BUN 44*   CREATININE 1.9*   CALCIUM 8.3*   PROT 5.2*   ALBUMIN 2.7*   BILITOT 0.6   ALKPHOS 78   AST 31   ALT 31   ANIONGAP 4*   EGFRNONAA 22.7*     Cardiac Markers:   Recent Labs   Lab 03/26/22  0906   *       Significant Imaging: I have reviewed all pertinent imaging results/findings within the past 24 hours.    CT Abdomen Pelvis  Without Contrast    Status: Final result         MyChart Results Release    MyChart Status: Active (w/ proxy users)  Results Release           PACS Images for [facility]  [place] Viewer     Show images for CT Abdomen Pelvis Without Contrast                  CT Abdomen Pelvis  Without Contrast  Order: [id number]   Status: Final result     Visible to patient: Yes (seen)     Next appt: 03/28/2022 at 09:20 AM in Family Medicine (Eli Edmonds MD)     0 Result Notes    Details    Reading Physician Reading Date Result Priority   Roc Nation Jr., MD  [phone] 3/26/2022      Narrative & Impression  EXAM: CT ABDOMEN AND PELVIS WITHOUT CONTRAST CT ABDOMEN PELVIS WITHOUT CONTRAST     CLINICAL INDICATION: Female, 89 years old. abdominal pain     TECHNIQUE: CT abdomen and pelvis was performed, without IV contrast, as per department protocol. Axial, sagittal, and coronal reconstructions were obtained.     IV CONTRAST: Not administered, limiting sensitivity of this exam for evaluation of solid visceral organs, vascular structures, and retroperitoneum.     ORAL CONTRAST: Not administered, limiting sensitivity of this exam for evaluation of bowel, retroperitoneum, and intraabdominal fluid collections.     RADIATION DOSE REDUCTION: This exam was performed according to the departmental dose-optimization program which includes automated exposure control, adjustment of the mA and/or kV according to patient size and/or use of iterative reconstruction technique.     COMPARISON: None     FINDINGS:  LOWER CHEST:  Mild bronchiectasis of the bronchus supplying the lower lungs bilaterally. Small tree-in-bud opacities about the peripheral edge of the right lower lobe. Significant parenchymal scarring contacting the pleural surface in the right lower lobe. Patchy areas of groundglass opacity in the right middle lobe near the cardiophrenic sulcus noted, peripherally. Small layering pleural effusion the left pleural cavity. Tortuous descending thoracic aorta. Global cardiomegaly. Calcification across the mitral valve plane. Mild anterior pericardial effusion.     LIVER:  No pathologic process.      GALLBLADDER:  Gallbladder has been surgically resected.     BILE DUCTS:  No pathologic process.     PANCREAS:  No pathologic process.     SPLEEN:  No pathologic process.     ADRENALS:  No pathologic process.     KIDNEYS AND URETERS:  Mild degenerative changes involving the kidneys. Dense uniform 8 mm calcification central hilum of left kidney.     URINARY BLADDER:     Is grossly normal in CT appearance. Prominent streak artifact arising the patient's operative hardware.     GASTROINTESTINAL TRACT:  Small large bowel loops appear normal caliber. Moderate fecal load throughout the entire colon. Findings may lead to clinical diagnosis of constipation.     APPENDIX:  No inflammatory changes in region of appendix.     LYMPH NODES:  No lymphadenopathy.     PERITONEUM/MESENTERY:  No free air, significant free fluid, mass or fluid collection.     VESSELS:  Aorta is ectatic deviated towards the left of midline. Large saccular aneurysm projecting towards the left of midline measuring 5.5 x 5.1 cm in cross-section with a vertical dimension of 5.0 cm terminating at the aortic bifurcation. Extensive plaque surrounding the aneurysmal segment. Segmental widening of the aorta extending into the bilateral common iliac arteries with fairly extensive atheromatous calcifications. No indication of impending rupture. No distinct intimal flap formation left in the unenhanced format.     ADDITIONAL RETROPERITONEAL FINDINGS:  None.     REPRODUCTIVE ORGANS: No pathologic process.     ABDOMINAL AND PELVIC WALLS:  No pathologic process.     MUSCULOSKELETAL:  Postoperative changes of previous operative hardware deployed within bony pelvis with IM nailing on the left side and postoperative changes of orthopedic prosthesis on the right. The right hip in optimal alignment.     ADDITIONAL FINDINGS:  None.     IMPRESSION:     1. Ectasia of the descending abdominal aorta with saccular expansion towards the left midline measuring 5.1 x 5.5 cm in  transverse width vertical dimension of 5.2 cm termination at the level of the aortic bifurcation projecting towards the left midline though without evidence of impending rupture or perianeurysmal hemorrhage.  2. Postoperative changes of previous gallbladder resection.  3. Regional areas of gravitational atelectasis and basilar scarring in the basilar segments with evidence of evidence of moderate parenchymal scarring on the right tracking towards the posterior edge of the right pleural collection.         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    Asthma-COPD overlap syndrome    Body mass index (BMI) 23.0-23.9, adult, today 24.1     [employer] Regulatory Update 10/1/2020      Smoker, quit 5/2016, 25 pck-years    Left ventricular diastolic dysfunction with preserved systolic function    Degenerative spinal arthritis    Abdominal aortic aneurysm without rupture    CKD (chronic kidney disease), stage III, eGFR 39, progressive 31    Hypothyroidism with abnormal thyroid function test    Hypertension associated with diabetes    Hyperlipidemia associated with type 2 diabetes mellitus    Type 2 diabetes mellitus with stage 4 chronic kidney disease       PLAN   duobeb PRN   IV steroid   Resume home meds  Follow UA catheter specimen   Increase clonidine dose . Persistently elevated and home BP readings are elevated as per family   CXR negative for pneumonia and occult blood negative   Watch for any sing of bleeding . Possible atrophic vaginitis .      VTE Risk Mitigation (From admission, onward)         Ordered     rivaroxaban tablet 10 mg  With dinner         03/26/22 1405     IP VTE HIGH RISK PATIENT  Once         03/26/22 1405     Place sequential compression device  Until discontinued         03/26/22 1405                   Malik LANDA  MD Nickie  Department of Hospital Medicine   [facility]

## 2022-03-26 NOTE — ED NOTES
Assumed care of pt from Jennifer perkins. pt in room resting in no distress, daughter at bedside. Pt c/o abd cramping, some jerking and bleeding from her bottom not sure where it is coming from but when she wipes she has some and in her underwear it is noted by her daughter. Reported all c/o to Dr. Alvarado.

## 2022-03-26 NOTE — HPI
Blaire Cage is a 89 y.o. Black or  female with known history of   CKD stage 3 heart failure ejection fraction 55% hypertension COPD anemia diabetes DVT P used to be on Xarelto but was taken off with by physician according to family hypothyroidism COVID-19 in September 2021 chronic respiratory failure on 2 L nasal cannula gastric antral vascular ectasia, AAA being monitored as outpatient, recently DC with syncopal episode and HTN urgency came back with SOB and wheezing started this morning.    Per EMS upon arrival patient found with room air sats of 81-82 and given IV steroid and duoneb and brought to ER .  Denied fever, no chest pain, no vomiting, no abdominal pain, no other constitutional symptoms.    In ER , patient is stable near baseline but daughter report that occassional bleeding at the rectal /vaginal area and patient complaint of suprapubic pain and CT abdomen was done but no significant serious findings noted except slight enlargement of abdominal AAA.

## 2022-03-27 NOTE — NURSING
Patient discharged off unit in stable condition via wheelchair to personal vehicle with discharge paperwork and personal belongings.

## 2022-03-27 NOTE — PLAN OF CARE
03/26/22 2012   Patient Assessment/Suction   Level of Consciousness (AVPU) alert   Respiratory Effort Normal;Unlabored   Expansion/Accessory Muscles/Retractions no use of accessory muscles   All Lung Fields Breath Sounds Anterior:   Rhythm/Pattern, Respiratory unlabored   Cough Frequency infrequent   PRE-TX-O2   O2 Device (Oxygen Therapy) nasal cannula   $ Is the patient on Low Flow Oxygen? Yes   Flow (L/min) 2   SpO2 99 %   Pulse Oximetry Type Intermittent   $ Pulse Oximetry - Multiple Charge Pulse Oximetry - Multiple   Pulse 72   Resp 18   Inhaler   $ Inhaler Charges PRN treatment not required   Education   $ Education DME Oxygen;15 min   Respiratory Evaluation   $ Care Plan Tech Time 15 min

## 2022-03-27 NOTE — PLAN OF CARE
Problem: Adult Inpatient Plan of Care  Goal: Plan of Care Review  Outcome: Met  Goal: Patient-Specific Goal (Individualized)  Outcome: Met  Goal: Absence of Hospital-Acquired Illness or Injury  Outcome: Met  Goal: Optimal Comfort and Wellbeing  Outcome: Met  Goal: Readiness for Transition of Care  Outcome: Met     Problem: Diabetes Comorbidity  Goal: Blood Glucose Level Within Targeted Range  Outcome: Met     Problem: Fall Injury Risk  Goal: Absence of Fall and Fall-Related Injury  Outcome: Met     Problem: Skin Injury Risk Increased  Goal: Skin Health and Integrity  Outcome: Met      Called clonazepam 2 refills to pharmacy recorder at   [facility]/pharmacy #[id number] - [place], [place] - [address]   [address] [place] [address]   Phone: [phone]      As dr lau approved.

## 2022-03-27 NOTE — PLAN OF CARE
03/27/22 1153   Final Note   Assessment Type Final Discharge Note   Anticipated Discharge Disposition Home   What phone number can be called within the next 1-3 days to see how you are doing after discharge? [phone]   Post-Acute Status   Discharge Delays None known at this time     Discharge orders reviewed. No case management/discharge planning needs noted.

## 2022-03-27 NOTE — NURSING
Discharge instructions reviewed with and given to patient. Verbalized understanding. IV removed without difficulty, catheter intact. Per case management notes no discharge planning needs. Awaiting personal transportation.

## 2022-03-27 NOTE — HOSPITAL COURSE
HPI: Blaire Cage is a 89 y.o. Black or  female with known history of   CKD stage 3 heart failure ejection fraction 55% hypertension COPD anemia diabetes DVT P used to be on Xarelto but was taken off with by physician according to family hypothyroidism COVID-19 in September 2021 chronic respiratory failure on 2 L nasal cannula gastric antral vascular ectasia, AAA being monitored as outpatient, recently DC with syncopal episode and HTN urgency came back with SOB and wheezing started this morning.    Per EMS upon arrival patient found with room air sats of 81-82 and given IV steroid and duoneb and brought to ER .  Denied fever, no chest pain, no vomiting, no abdominal pain, no other constitutional symptoms.    In ER , patient is stable near baseline but daughter report that occassional bleeding at the rectal /vaginal area and patient complaint of suprapubic pain and CT abdomen was done but no significant serious findings noted except slight enlargement of abdominal AAA.       Hospital course  Patient was admitted with acute COPD exacerbation.  He was given IV steroid and nebulization treatments and quickly better.  Family report a small amount of blood PV/NV last week and patient complained of pelvic pain and CT scan of the abdomen was done and not significant and no more episodes of bleeding seen and documented since admission.  Later patient is back to baseline and discharged in stable condition with a taper steroid.  Her blood pressure was high persistently before the admission and during the hospital stay and clonidine dose was increased to t.i.d..  Family updated not to give clonidine afternoon dose if blood pressure systolic blood pressure is less than 150.

## 2022-03-27 NOTE — DISCHARGE SUMMARY
[facility] Medicine  Discharge Summary      Patient Name: Blaire Cage  MRN: [id number]  Patient Class: OP- Observation  Admission Date: 3/26/2022  Hospital Length of Stay: 0 days  Discharge Date and Time:  03/27/2022 2:06 PM  Attending Physician: Malik Fu MD   Discharging Provider: Malik Fu MD  Primary Care Provider: Eli Edmonds MD      HPI:   Blaire Cage is a 89 y.o. Black or  female with known history of   CKD stage 3 heart failure ejection fraction 55% hypertension COPD anemia diabetes DVT P used to be on Xarelto but was taken off with by physician according to family hypothyroidism COVID-19 in September 2021 chronic respiratory failure on 2 L nasal cannula gastric antral vascular ectasia, AAA being monitored as outpatient, recently DC with syncopal episode and HTN urgency came back with SOB and wheezing started this morning.    Per EMS upon arrival patient found with room air sats of 81-82 and given IV steroid and duoneb and brought to ER .  Denied fever, no chest pain, no vomiting, no abdominal pain, no other constitutional symptoms.    In ER , patient is stable near baseline but daughter report that occassional bleeding at the rectal /vaginal area and patient complaint of suprapubic pain and CT abdomen was done but no significant serious findings noted except slight enlargement of abdominal AAA.      * No surgery found *      Hospital Course:     HPI: Blaire Cage is a 89 y.o. Black or  female with known history of   CKD stage 3 heart failure ejection fraction 55% hypertension COPD anemia diabetes DVT P used to be on Xarelto but was taken off with by physician according to family hypothyroidism COVID-19 in September 2021 chronic respiratory failure on 2 L nasal cannula gastric antral vascular ectasia, AAA being monitored as outpatient, recently DC with syncopal episode and HTN urgency came back with SOB and wheezing started this  morning.    Per EMS upon arrival patient found with room air sats of 81-82 and given IV steroid and duoneb and brought to ER .  Denied fever, no chest pain, no vomiting, no abdominal pain, no other constitutional symptoms.    In ER , patient is stable near baseline but daughter report that occassional bleeding at the rectal /vaginal area and patient complaint of suprapubic pain and CT abdomen was done but no significant serious findings noted except slight enlargement of abdominal AAA.       Hospital course  Patient was admitted with acute COPD exacerbation.  He was given IV steroid and nebulization treatments and quickly better.  Family report a small amount of blood PV/ME last week and patient complained of pelvic pain and CT scan of the abdomen was done and not significant and no more episodes of bleeding seen and documented since admission.  Later patient is back to baseline and discharged in stable condition with a taper steroid.  Her blood pressure was high persistently before the admission and during the hospital stay and clonidine dose was increased to t.i.d..  Family updated not to give clonidine afternoon dose if blood pressure systolic blood pressure is less than 150.       Goals of Care Treatment Preferences:  Code Status: Full Code      Consults:   Consults (From admission, onward)        Status Ordering Provider     Inpatient consult to Hospitalist  Once        Provider:  Malik Fu MD    Acknowledged ALEXANDRA GHOSH          No new Assessment & Plan notes have been filed under this hospital service since the last note was generated.  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  [Z15.89] 08/07/2018 Not Applicable    Asthma-COPD overlap syndrome [J44.9] 06/09/2018 Yes    Body mass index (BMI) 23.0-23.9, adult, today 24.1 [Z68.23] 09/01/2016 Not Applicable    Smoker, quit 5/2016, 25 pck-years [F17.200] 06/15/2016 Yes    Left ventricular diastolic dysfunction with preserved systolic function [I51.89] 06/05/2016 Yes    Degenerative spinal arthritis [M47.819] 03/01/2016 Yes    Abdominal aortic aneurysm without rupture [I71.4] 11/05/2015 Yes    CKD (chronic kidney disease), stage III, eGFR 39, progressive 31 [N18.30] 01/18/2013 Yes    Hypothyroidism with abnormal thyroid function test [E03.9] 01/18/2013 Yes     Chronic    Hypertension associated with diabetes [E11.59, I15.2] 01/18/2013 Yes    Hyperlipidemia associated with type 2 diabetes mellitus [E11.69, E78.5] 01/18/2013 Yes    Type 2 diabetes mellitus with stage 4 chronic kidney disease [E11.22, N18.4] 01/18/2013 Yes      Problems Resolved During this Admission:       Discharged Condition: good    Disposition: Home or Self Care    Follow Up:   Follow-up Information     Eli Edmonds MD Follow up in 2 week(s).    Specialty: Family Medicine  Contact information:  [address] [facility] [address] [phone]                       Patient Instructions:      Diet Cardiac     Activity as tolerated       Significant Diagnostic Studies: Labs:   CMP   Recent Labs   Lab 03/26/22  0906 03/27/22  0512    140   K 3.9 4.5    106   CO2 29 24    139*   BUN 44* 55*   CREATININE 1.9* 1.9*   CALCIUM 8.3* 8.6*   PROT 5.2*  --    ALBUMIN 2.7*  --    BILITOT 0.6  --    ALKPHOS 78  --    AST 31  --    ALT 31  --    ANIONGAP 4* 10   ESTGFRAFRICA 26.2* 26.2*   EGFRNONAA 22.7* 22.7*   , CBC   Recent Labs   Lab 03/26/22  0906 03/27/22  0512   WBC 6.25 8.67   HGB 9.4* 9.3*   HCT 29.7* 30.5*   * 152    and INR   Lab Results   Component Value Date    INR 1.4 10/19/2021    INR 1.6 09/21/2021    INR 1.3 08/03/2021       Pending  Diagnostic Studies:     None         Medications:  Reconciled Home Medications:      Medication List      CHANGE how you take these medications    albuterol 2.5 mg /3 mL (0.083 %) nebulizer solution  Commonly known as: PROVENTIL  USE 1 VIAL IN NEBULIZER EVERY 6 HOURS AS NEEDED FOR WHEEZING. RESCUE  What changed:   · how much to take  · how to take this  · when to take this  · reasons to take this     cloNIDine 0.1 MG tablet  Commonly known as: CATAPRES  Take 1 tablet (0.1 mg total) by mouth 3 (three) times daily.  What changed: when to take this     diclofenac sodium 1 % Gel  Commonly known as: VOLTAREN  Apply 2 g topically once daily.  What changed:   · when to take this  · reasons to take this     fluticasone propionate 50 mcg/actuation nasal spray  Commonly known as: FLONASE  2 sprays by Each Nare route once daily.  What changed:   · when to take this  · reasons to take this     levothyroxine 88 MCG tablet  Commonly known as: SYNTHROID  Take 1 tablet by mouth once daily  What changed: when to take this     nitroGLYCERIN 0.4 MG SL tablet  Commonly known as: NITROSTAT  Place 1 tablet (0.4 mg total) under the tongue every 5 (five) minutes as needed for Chest pain. As needed  What changed: additional instructions        CONTINUE taking these medications    acetaminophen 325 MG tablet  Commonly known as: TYLENOL  Take 325 mg by mouth every 6 (six) hours as needed for Pain.     allopurinoL 100 MG tablet  Commonly known as: ZYLOPRIM  Take 100 mg by mouth once daily.     ascorbic acid (vitamin C) 500 MG tablet  Commonly known as: VITAMIN C  Take 500 mg by mouth once daily.     calcium carbonate 500 mg calcium (1,250 mg) tablet  Commonly known as: OS-TASIA  Take 1 tablet by mouth 2 (two) times daily.     diltiaZEM 180 MG 24 hr capsule  Commonly known as: CARDIZEM CD  Take 1 capsule (180 mg total) by mouth once daily.     ferrous sulfate 325 mg (65 mg iron) Tab tablet  Commonly known as: FEOSOL  Take 325 mg by mouth once  daily.     fish oil-omega-3 fatty acids 300-1,000 mg capsule  Take 2 g by mouth every evening.     FOLBIC 2.5-25-2 mg Tab  Generic drug: folic acid-vit B6-vit B12 2.5-25-2 mg  Take 1 tablet by mouth once daily.     furosemide 20 MG tablet  Commonly known as: LASIX  Take 1 tablet (20 mg total) by mouth as needed. Take 1 tablet only as needed, for swelling, increase SOB, weight gain of 3 lbs overnight or 5 lbs for the week     losartan 25 MG tablet  Commonly known as: COZAAR  Take 1 tablet (25 mg total) by mouth once daily.     magnesium oxide 400 mg (241.3 mg magnesium) tablet  Commonly known as: MAG-OX  Take 1 tablet by mouth once daily     meclizine 25 mg tablet  Commonly known as: ANTIVERT  Take 1 tablet (25 mg total) by mouth 3 (three) times daily as needed.     montelukast 10 mg tablet  Commonly known as: SINGULAIR  Take 1 tablet (10 mg total) by mouth every evening.     multivitamin tablet  Commonly known as: THERAGRAN  Take 1 tablet by mouth once daily.     omeprazole 40 MG capsule  Commonly known as: PRILOSEC  Take 1 capsule by mouth once daily     polyethylene glycol 17 gram/dose powder  Commonly known as: GLYCOLAX  Take 17 g by mouth 2 (two) times a day.     rosuvastatin 20 MG tablet  Commonly known as: CRESTOR  Take 1 tablet (20 mg total) by mouth once daily.     sertraline 25 MG tablet  Commonly known as: ZOLOFT  Take 1 tablet (25 mg total) by mouth once daily.     tamsulosin 0.4 mg Cap  Commonly known as: FLOMAX  Take 1 capsule (0.4 mg total) by mouth once daily.     TRELEGY ELLIPTA 200-62.5-25 mcg inhaler  Generic drug: fluticasone-umeclidin-vilanter  Inhale 1 puff into the lungs once daily.     vitamin D 1000 units Tab  Commonly known as: VITAMIN D3  Take 1 tablet (1,000 Units total) by mouth once daily.     XARELTO 10 mg Tab  Generic drug: rivaroxaban  Take 10 mg by mouth daily with dinner or evening meal. HCS        STOP taking these medications    ketoconazole 2 % shampoo  Commonly known as:  NIZORAL            Indwelling Lines/Drains at time of discharge:   Lines/Drains/Airways     None               General: Patient resting comfortably in no acute distress. Appears as stated age. Calm  Eyes: EOM intact. No conjunctivae injection. No scleral icterus.  ENT: Hearing grossly intact. No discharge from ears. No nasal discharge.   CVS: RRR. No LE edema BL.  Lungs: CTA BL, no wheezing or crackles. Good breath sounds. No accessory muscle use. No acute respiratory distress  Neuro: non focal , Follows commands. Responds appropriately  Time spent on the discharge of patient: 22 minutes         Malik Fu MD  Department of Hospital Medicine  [facility]

## 2022-03-29 NOTE — PLAN OF CARE
Problem: Fall Injury Risk  Goal: Absence of Fall and Fall-Related Injury  Outcome: Ongoing, Progressing  Intervention: Identify and Manage Contributors  Flowsheets (Taken 3/29/2022 1400)  Self-Care Promotion: independence encouraged  Medication Review/Management: medications reviewed  Intervention: Promote Injury-Free Environment  Flowsheets (Taken 3/29/2022 1400)  Safety Promotion/Fall Prevention: medications reviewed

## 2022-04-07 NOTE — TELEPHONE ENCOUNTER
Returned patient call in regards to vaginal irritation.daughter states patient is having vaginal irritation , some white discharge , and some itching. Daughter is asking for something to treat a yeast infection.   Will forward message to provider for further assistance.

## 2022-04-07 NOTE — TELEPHONE ENCOUNTER
----- Message from Edith Khan sent at 4/7/2022 10:47 AM CDT -----  Contact: daughter  Type: Needs Medical Advice  Who Called:  Annabella Guerra (Daughter)  Symptoms (please be specific):  irradiation in vaginal area  How long has patient had these symptoms:  a few days  Pharmacy name and phone #:    [id number] - [place] - [address]  [place] [address]  Phone: [phone] Fax: [phone]  Best Call Back Number: [phone] (home)     Additional Information: requesting something for a yeast infection

## 2022-04-08 NOTE — TELEPHONE ENCOUNTER
Pt daughter states she has taken her to the urgent care clinic in [place], will call back if appt is still needed.

## 2022-04-08 NOTE — PATIENT INSTRUCTIONS
Fluconazole 100mg every other day  Nystatin powder to skin 4 times daily  After bathing make sure area is dry  Change incontinence pads frequently  Follow up is symptoms persist or worsen

## 2022-04-08 NOTE — PROGRESS NOTES
"Subjective:       Patient ID: Blaire Cage is a 89 y.o. female.    Vitals:  height is 5' 3" (1.6 m) and weight is 56.7 kg (125 lb). Her oral temperature is 97.3 °F (36.3 °C). Her blood pressure is 179/66 (abnormal) and her pulse is 62. Her respiration is 16 and oxygen saturation is 97%.     Chief Complaint: Urinary Tract Infection    Daughter states "Mom has been c/o vaginal pain."    Urinary Tract Infection   This is a recurrent problem. Episode onset: 2 weeks ago. The problem has been gradually worsening. The quality of the pain is described as burning. Associated symptoms include frequency. Associated symptoms comments: Back pain  Discolored urine  Urine odor  . She has tried nothing for the symptoms.       Genitourinary: Positive for frequency and vaginal pain.       Objective:      Physical Exam   Constitutional: She is oriented to person, place, and time.   HENT:   Head: Normocephalic and atraumatic.   Mouth/Throat: Mucous membranes are moist.   Eyes: Conjunctivae are normal.   Pulmonary/Chest: Effort normal.   Abdominal: Normal appearance. Soft.   Genitourinary: There is rash on the right labia. There is rash on the left labia.   Musculoskeletal: Normal range of motion.         General: Normal range of motion.   Neurological: She is alert and oriented to person, place, and time.   Skin: Skin is warm and dry. Capillary refill takes 2 to 3 seconds.         Comments: Toenails thick, yellow in color   Psychiatric: Her behavior is normal. Mood normal.   Nursing note and vitals reviewed.chaperone present (Daughter)           Assessment:       1. Dysuria    2. Vaginal candidiasis     UA + Protein     Plan:       Perineum red and swollen consistent with candidiasis. She reports burning and itching. She uses incontinence pads.   Dysuria  -     POCT Urinalysis, Dipstick, Automated, W/O Scope    Vaginal candidiasis  -     fluconazole (DIFLUCAN) 100 MG tablet; Take 1 tablet (100 mg total) by mouth every other day. " for 6 days  Dispense: 3 tablet; Refill: 0  -     nystatin (MYCOSTATIN) powder; Apply topically 4 (four) times daily.  Dispense: 60 g; Refill: 1    Fluconazole 100mg every other day  Nystatin powder to skin 4 times daily  After bathing make sure area is dry  Change incontinence pads frequently  Follow up is symptoms persist or worsen

## 2022-04-08 NOTE — TELEPHONE ENCOUNTER
----- Message from Meg Uribe sent at 4/8/2022 11:38 AM CDT -----  Contact: Annabella Daughter  Type:  Same Day Appointment Request    Caller is requesting a same day appointment.  Caller declined first available appointment listed below.      Name of Caller:  Annabella  When is the first available appointment?  4/25  Symptoms:  Pain in vagina, possible yeast inf./ pt was in hosp and had a cathater which caused issues  Best Call Back Number:  [phone]  Additional Information:   She is trying to get her in today to have someone treat this. Thank you.

## 2022-04-11 NOTE — TELEPHONE ENCOUNTER
Rx called in for diflucan 150mg po x 1 , may repeat 8 h prn.  I also called in miconazole vag suppository to insert vaginally nightly for 3 nights for topical relief and treatment

## 2022-04-12 NOTE — PLAN OF CARE
Problem: Anemia  Goal: Anemia Symptom Improvement  Intervention: Monitor and Manage Anemia  Flowsheets (Taken 4/12/2022 1515)  Oral Nutrition Promotion: medicated

## 2022-04-14 PROBLEM — N25.81 SECONDARY HYPERPARATHYROIDISM OF RENAL ORIGIN: Status: ACTIVE | Noted: 2022-01-01

## 2022-04-14 PROBLEM — D68.69 OTHER THROMBOPHILIA: Status: ACTIVE | Noted: 2022-01-01

## 2022-04-14 PROBLEM — I20.9 ANGINA PECTORIS, UNSPECIFIED: Status: ACTIVE | Noted: 2022-01-01

## 2022-04-14 NOTE — PATIENT INSTRUCTIONS
Viral Rudd,     If you are due for any health screening(s) below please notify me so we can arrange them to be ordered and scheduled to maintain your health. Most healthy patients complete it. Don't lose out on improving your health.     Health Maintenance   Topic Date Due    TETANUS VACCINE  Never done    Lipid Panel  12/09/2021    Eye Exam  03/22/2022    Hemoglobin A1c  08/07/2022    Foot Exam  04/14/2023                  Diabetic Retinal Eye Exam    Diabetes is the #1 cause of blindness in the [place] - early detection before signs or symptoms develop can prevent debilitating blindness.    Once-a-year screening is recommended for all diabetic patients. This exam can prevent and treat diabetes complications in the eye before developing symptoms. This can be done with a special camera is used to take photographs of the back of your eye without having to dilate them, or you can see an eye doctor for a full dilated exam.    Although you are still overdue for this important screening, due to the COVID-19 pandemic, we recommend scheduling it in the near future.

## 2022-04-14 NOTE — PROGRESS NOTES
Subjective:       Patient ID: Blaire Cage is a 89 y.o. female.    Chief Complaint: Hospital Follow Up    HPI    Patient presents today for hospital follow up for COPD exacerbation. given IV steroid and nebulization treatments.  Last visit with PCP    4/8/22 Urgent care for yeast infection  3/6/22 hospital stay for COPD exacerbation  3/21/22 hospital stay for syncope and HTN  3/15/22 Hem/Onc Dr Alvarez: Heterozygous MTHFR mutation  2/28/22 Cardiology Dr.Bethala shea maker check  2/14/22- chronic pain of right knee, popliteal cyst  2/2/22: Asthama-COPD        Take 2 tabs of the Zoloft  Past Medical History:   Diagnosis Date    Anemia due to multiple mechanisms 6/29/2018    Anemia, chronic disease 6/29/2018    Anemia, chronic renal failure, stage 3 (moderate) 6/29/2018    Anticoagulant long-term use     aspirin    Aortic aneurysm     Chest pain     CHF (congestive heart failure)     COPD (chronic obstructive pulmonary disease)     COPD with acute exacerbation 1/9/2015    Depression     Diabetes mellitus type II     no longer on any DM meds    DVT (deep venous thrombosis) 06/09/2018    Encounter for blood transfusion     GERD (gastroesophageal reflux disease)     GI bleed     Gout     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 8/7/2018    Hip arthritis 3/1/2016    Homocysteinemia 8/7/2018    Hyperlipidemia     Hypertension     Incontinent of urine     Normocytic normochromic anemia 6/29/2018    Oxygen dependent     use oxygen as needed    PE (pulmonary thromboembolism) 06/09/2018    Pneumonia of right lower lobe due to infectious organism 9/11/2017    Syncope     Thyroid disease     hypothyroid    Type 2 diabetes mellitus with stage 3 chronic kidney disease 1/18/2013    UTI (urinary tract infection)        Review of patient's allergies indicates:   Allergen Reactions    Carvedilol Other (See Comments)     Bradycardia and syncope    Boniva [ibandronate]     Codeine      Hydralazine analogues     Iodinated contrast media Hives    Kionex [sodium polystyrene sulfonate] Diarrhea     From encounter 12/11/17 for diarrhea--not true allergy.    Lisinopril     Morphine          Current Outpatient Medications:     acetaminophen (TYLENOL) 325 MG tablet, Take 325 mg by mouth every 6 (six) hours as needed for Pain., Disp: , Rfl:     albuterol (PROVENTIL) 2.5 mg /3 mL (0.083 %) nebulizer solution, USE 1 VIAL IN NEBULIZER EVERY 6 HOURS AS NEEDED FOR WHEEZING. RESCUE (Patient taking differently: Take 2.5 mg by nebulization every 6 (six) hours as needed for Wheezing or Shortness of Breath. USE 1 VIAL IN NEBULIZER EVERY 6 HOURS AS NEEDED FOR WHEEZING. RESCUE), Disp: 300 mL, Rfl: 3    allopurinoL (ZYLOPRIM) 100 MG tablet, Take 100 mg by mouth once daily., Disp: , Rfl:     ascorbic acid, vitamin C, (VITAMIN C) 500 MG tablet, Take 500 mg by mouth once daily., Disp: , Rfl:     calcium carbonate (OS-TASIA) 500 mg calcium (1,250 mg) tablet, Take 1 tablet by mouth 2 (two) times daily., Disp: , Rfl:     cloNIDine (CATAPRES) 0.1 MG tablet, Take 1 tablet (0.1 mg total) by mouth 3 (three) times daily., Disp: 90 tablet, Rfl: 0    diclofenac sodium (VOLTAREN) 1 % Gel, Apply 2 g topically once daily. (Patient taking differently: Apply 2 g topically as needed.), Disp: 100 g, Rfl: prn    ferrous sulfate (FEOSOL) 325 mg (65 mg iron) Tab tablet, Take 325 mg by mouth once daily., Disp: , Rfl:     fish oil-omega-3 fatty acids 300-1,000 mg capsule, Take 2 g by mouth every evening., Disp: , Rfl:     fluticasone (FLONASE) 50 mcg/actuation nasal spray, 2 sprays by Each Nare route once daily. (Patient taking differently: 2 sprays by Each Nostril route as needed.), Disp: 48 g, Rfl: 3    fluticasone-umeclidin-vilanter (TRELEGY ELLIPTA) 200-62.5-25 mcg inhaler, Inhale 1 puff into the lungs once daily., Disp: 60 each, Rfl: 11    folic acid-vit B6-vit B12 2.5-25-2 mg (FOLBIC) 2.5-25-2 mg Tab, Take 1 tablet by  mouth once daily., Disp: , Rfl:     furosemide (LASIX) 20 MG tablet, Take 1 tablet (20 mg total) by mouth as needed. Take 1 tablet only as needed, for swelling, increase SOB, weight gain of 3 lbs overnight or 5 lbs for the week, Disp: , Rfl:     levothyroxine (SYNTHROID) 88 MCG tablet, Take 1 tablet by mouth once daily (Patient taking differently: Take 88 mcg by mouth before breakfast.), Disp: 90 tablet, Rfl: 3    losartan (COZAAR) 25 MG tablet, Take 1 tablet (25 mg total) by mouth once daily., Disp: 90 tablet, Rfl: 3    magnesium oxide (MAG-OX) 400 mg (241.3 mg magnesium) tablet, Take 1 tablet by mouth once daily (Patient taking differently: Take 400 mg by mouth once daily.), Disp: 90 tablet, Rfl: 3    meclizine (ANTIVERT) 25 mg tablet, Take 1 tablet (25 mg total) by mouth 3 (three) times daily as needed., Disp: 20 tablet, Rfl: prn    miconazole nitrate (MONISTAT 3) 200 mg Supp, Place 1 suppository (200 mg total) vaginally every evening., Disp: 3 suppository, Rfl: 0    montelukast (SINGULAIR) 10 mg tablet, Take 1 tablet (10 mg total) by mouth every evening., Disp: 90 tablet, Rfl: 3    multivitamin (THERAGRAN) tablet, Take 1 tablet by mouth once daily., Disp: , Rfl:     nitroGLYCERIN (NITROSTAT) 0.4 MG SL tablet, Place 1 tablet (0.4 mg total) under the tongue every 5 (five) minutes as needed for Chest pain. As needed (Patient taking differently: Place 0.4 mg under the tongue every 5 (five) minutes as needed for Chest pain. Seek medical help if pain is not relieved after the third dose.), Disp: 30 tablet, Rfl: 3    nystatin (MYCOSTATIN) powder, Apply topically 4 (four) times daily., Disp: 60 g, Rfl: 1    omeprazole (PRILOSEC) 40 MG capsule, Take 1 capsule by mouth once daily (Patient taking differently: Take 40 mg by mouth once daily.), Disp: 90 capsule, Rfl: 3    polyethylene glycol (GLYCOLAX) 17 gram/dose powder, Take 17 g by mouth 2 (two) times a day., Disp: , Rfl:     rivaroxaban (XARELTO) 10 mg Tab,  "Take 10 mg by mouth daily with dinner or evening meal. HCS, Disp: , Rfl:     rosuvastatin (CRESTOR) 20 MG tablet, Take 1 tablet (20 mg total) by mouth once daily., Disp: 90 tablet, Rfl: 3    sertraline (ZOLOFT) 25 MG tablet, Take 1 tablet (25 mg total) by mouth once daily., Disp: 90 tablet, Rfl: 3    vitamin D 1000 units Tab, Take 1 tablet (1,000 Units total) by mouth once daily., Disp: 90 tablet, Rfl: 3    ciclopirox (PENLAC) 8 % Soln, Apply topically nightly., Disp: 6.6 mL, Rfl: 3    diltiaZEM (CARDIZEM CD) 180 MG 24 hr capsule, Take 1 capsule (180 mg total) by mouth once daily., Disp: 90 capsule, Rfl: 3    fluconazole (DIFLUCAN) 150 MG Tab, Take 1 and may repeat 8 h  prn (Patient not taking: Reported on 4/14/2022), Disp: 2 tablet, Rfl: 0    tamsulosin (FLOMAX) 0.4 mg Cap, Take 1 capsule (0.4 mg total) by mouth once daily., Disp: 30 capsule, Rfl: 0    Review of Systems   Constitutional: Negative for unexpected weight change.   HENT: Negative for trouble swallowing.    Eyes: Negative for visual disturbance.   Respiratory: Negative for shortness of breath.    Cardiovascular: Negative for chest pain, palpitations and leg swelling.   Gastrointestinal: Negative for blood in stool.   Genitourinary: Negative for hematuria.   Skin: Negative for rash.   Allergic/Immunologic: Negative for immunocompromised state.   Neurological: Negative for headaches.   Hematological: Does not bruise/bleed easily.   Psychiatric/Behavioral: Negative for agitation. The patient is not nervous/anxious.        Objective:      BP (!) 150/64 (BP Location: Right arm, Patient Position: Sitting, BP Method: Small (Manual))   Pulse 78   Ht 5' 3" (1.6 m)   Wt 58.5 kg (128 lb 15.5 oz)   LMP  (LMP Unknown)   SpO2 96%   BMI 22.85 kg/m²   Physical Exam  Constitutional:       Appearance: She is well-developed.   Eyes:      Pupils: Pupils are equal, round, and reactive to light.   Cardiovascular:      Rate and Rhythm: Normal rate and regular " rhythm.      Heart sounds: Normal heart sounds.   Pulmonary:      Effort: Pulmonary effort is normal.      Breath sounds: Normal breath sounds.   Abdominal:      General: Bowel sounds are normal.      Palpations: Abdomen is soft.   Musculoskeletal:         General: Normal range of motion.      Cervical back: Normal range of motion.      Comments: In wheelchair   Skin:     General: Skin is warm and dry.   Neurological:      Mental Status: She is alert and oriented to person, place, and time.   Psychiatric:         Behavior: Behavior normal.         Thought Content: Thought content normal.         Judgment: Judgment normal.         Assessment:       1. COPD exacerbation    2. Vaginal pain    3. Secondary hyperparathyroidism of renal origin    4. Other thrombophilia    5. Angina pectoris, unspecified    6. Hypertension associated with diabetes    7. Type 2 diabetes mellitus with stage 4 chronic kidney disease, without long-term current use of insulin    8. Iron deficiency anemia due to chronic blood loss    9. Toenail fungus    10. BMI 22.0-22.9, adult        Plan:       COPD exacerbation  Stable, completed taper steroid  Continue resp inhaler  Continue follow up with Pulshellie Christopher/Facundo  Vaginal pain  -     Ambulatory referral/consult to Obstetrics / Gynecology; Future; Expected date: 04/21/2022  patient report that occassional bleeding at the rectal /vaginal area pain and discharge ER CT abdomen was done but no significant serious findings noted except slight enlargement of abdominal AAA.  Secondary hyperparathyroidism of renal origin  Stable, continue management  Other thrombophilia  Stable, continue management  Angina pectoris, unspecified  Stable, continue follow up with cardiology Caroline  Hypertension associated with diabetes   Family updated not to give clonidine afternoon dose if blood pressure systolic blood pressure is less than 150  Low sodium diet  2 week BP log  BP Readings from Last 3 Encounters:    04/14/22 (!) 150/64   04/12/22 (!) 178/53   04/08/22 (!) 179/66     Type 2 diabetes mellitus with stage 4 chronic kidney disease, without long-term current use of insulin  Stable, continue  Follow up with Nephro Dr. Jo   Iron deficiency anemia due to chronic blood loss  Cont heme/onc monitoring and care  Toenail fungus  -     ciclopirox (PENLAC) 8 % Soln; Apply topically nightly.  Dispense: 6.6 mL; Refill: 3    BMI 22.0-22.9, adult  Stable, continue managment        Patient readiness: acceptance and barriers:none    During the course of the visit the patient was educated and counseled about the following:     Diabetes:  Discussed general issues about diabetes pathophysiology and management.  Addressed ADA diet.  Hypertension:   Dietary sodium restriction.  Regular aerobic exercise.    Goals: Diabetes: Maintain Hemoglobin A1C below 7 and Hypertension: Reduce Blood Pressure    Did patient meet goals/outcomes: No    The following self management tools provided: blood pressure log    Patient Instructions (the written plan) was given to the patient/family.     Time spent with patient: 15 minutes    Barriers to medications present (no )    Adverse reactions to current medications (no)    Over the counter medications reviewed (Yes)

## 2022-04-20 NOTE — PROGRESS NOTES
Health Maintenance Due   Topic Date Due    TETANUS VACCINE  Never done    Shingles Vaccine (1 of 2) 10/02/2014    Diabetes Urine Screening  05/23/2018    Lipid Panel  12/09/2021    COVID-19 Vaccine (4 - Booster for Pfizer series) 03/09/2022    Eye Exam  03/22/2022     Updates were requested from care everywhere.  Chart was reviewed for overdue Proactive [facility] Encounters (GERRI) topics (CRS, Breast Cancer Screening, Eye exam)  Health Maintenance has been updated.  LIN[place] immunization registry triggered.  Immunizations were reconciled.

## 2022-04-22 NOTE — PROGRESS NOTES
History & Physical  Gynecology      SUBJECTIVE:     Chief Complaint: Establish Care, Vaginal Pain, Vaginal Itching, and Vaginal Irritation       History of Present Illness:    Here today for vaginal pain and irritation. Wears diaper, incontinent.     No vaginal bleeding. No discharge.     Review of patient's allergies indicates:   Allergen Reactions    Carvedilol Other (See Comments)     Bradycardia and syncope    Boniva [ibandronate]     Codeine     Hydralazine analogues     Iodinated contrast media Hives    Kionex [sodium polystyrene sulfonate] Diarrhea     From encounter 12/11/17 for diarrhea--not true allergy.    Lisinopril     Morphine        Past Medical History:   Diagnosis Date    Anemia due to multiple mechanisms 6/29/2018    Anemia, chronic disease 6/29/2018    Anemia, chronic renal failure, stage 3 (moderate) 6/29/2018    Anticoagulant long-term use     aspirin    Aortic aneurysm     Chest pain     CHF (congestive heart failure)     COPD (chronic obstructive pulmonary disease)     COPD with acute exacerbation 1/9/2015    Depression     Diabetes mellitus type II     no longer on any DM meds    DVT (deep venous thrombosis) 06/09/2018    Encounter for blood transfusion     GERD (gastroesophageal reflux disease)     GI bleed     Gout     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 8/7/2018    Hip arthritis 3/1/2016    Homocysteinemia 8/7/2018    Hyperlipidemia     Hypertension     Incontinent of urine     Normocytic normochromic anemia 6/29/2018    Oxygen dependent     use oxygen as needed    PE (pulmonary thromboembolism) 06/09/2018    Pneumonia of right lower lobe due to infectious organism 9/11/2017    Syncope     Thyroid disease     hypothyroid    Type 2 diabetes mellitus with stage 3 chronic kidney disease 1/18/2013    UTI (urinary tract infection)      Past Surgical History:   Procedure Laterality Date    APPENDECTOMY      CHOLECYSTECTOMY      COLONOSCOPY Left 10/29/2020     Procedure: COLONOSCOPY;  Surgeon: Singh Kee III, MD;  Location: [facility];  Service: Endoscopy;  Laterality: Left;    ESOPHAGOGASTRODUODENOSCOPY Left 10/26/2020    Procedure: EGD (ESOPHAGOGASTRODUODENOSCOPY);  Surgeon: Kareem Gramajo MD;  Location: [facility];  Service: Endoscopy;  Laterality: Left;    ESOPHAGOGASTRODUODENOSCOPY Left 10/28/2020    Procedure: EGD (ESOPHAGOGASTRODUODENOSCOPY);  Surgeon: Kareem Gramajo MD;  Location: [facility];  Service: Endoscopy;  Laterality: Left;    ESOPHAGOGASTRODUODENOSCOPY N/A 9/16/2021    Procedure: EGD (ESOPHAGOGASTRODUODENOSCOPY);  Surgeon: Kareem Gramajo MD;  Location: [facility];  Service: Endoscopy;  Laterality: N/A;    FRACTURE SURGERY      right hip     HERNIA REPAIR      groin    HYSTERECTOMY      INSERTION OF IMPLANTABLE LOOP RECORDER N/A 12/12/2018    Procedure: Insertion, Implantable Loop Recorder;  Surgeon: Ashok Herbert MD;  Location: [facility] [place] CATH LAB;  Service: Cardiology;  Laterality: N/A;    PERCUTANEOUS PINNING OF HIP Left 3/23/2019    Procedure: PINNING, HIP, PERCUTANEOUS;  Surgeon: Jorje Hamlin MD;  Location: [facility] [place] OR;  Service: Orthopedics;  Laterality: Left;    SMALL BOWEL ENTEROSCOPY N/A 10/29/2020    Procedure: ENTEROSCOPY;  Surgeon: Singh Kee III, MD;  Location: [facility];  Service: Endoscopy;  Laterality: N/A;    VARICOSE VEIN SURGERY       OB History    No obstetric history on file.       Family History   Problem Relation Age of Onset    Hypertension Mother     Heart disease Mother     Lung disease Father     Diabetes Sister     Diabetes Brother     Kidney disease Son     Breast cancer Neg Hx     Ovarian cancer Neg Hx      Social History     Tobacco Use    Smoking status: Former Smoker     Packs/day: 0.35     Years: 44.00     Pack years: 15.40     Types: Cigarettes     Start date: 1970    Smokeless tobacco: Never Used    Tobacco comment: 1/08/2015   Substance Use Topics    Alcohol use: No      Alcohol/week: 0.0 standard drinks    Drug use: No       Current Outpatient Medications   Medication Sig    acetaminophen (TYLENOL) 325 MG tablet Take 325 mg by mouth every 6 (six) hours as needed for Pain.    albuterol (PROVENTIL) 2.5 mg /3 mL (0.083 %) nebulizer solution USE 1 VIAL IN NEBULIZER EVERY 6 HOURS AS NEEDED FOR WHEEZING. RESCUE (Patient taking differently: Take 2.5 mg by nebulization every 6 (six) hours as needed for Wheezing or Shortness of Breath. USE 1 VIAL IN NEBULIZER EVERY 6 HOURS AS NEEDED FOR WHEEZING. RESCUE)    allopurinoL (ZYLOPRIM) 100 MG tablet Take 100 mg by mouth once daily.    ascorbic acid, vitamin C, (VITAMIN C) 500 MG tablet Take 500 mg by mouth once daily.    calcium carbonate (OS-TASIA) 500 mg calcium (1,250 mg) tablet Take 1 tablet by mouth 2 (two) times daily.    ciclopirox (PENLAC) 8 % Soln Apply topically nightly.    clobetasol 0.05% (TEMOVATE) 0.05 % Oint Apply topically 2 (two) times daily.    cloNIDine (CATAPRES) 0.1 MG tablet Take 1 tablet (0.1 mg total) by mouth 3 (three) times daily.    diclofenac sodium (VOLTAREN) 1 % Gel Apply 2 g topically once daily. (Patient taking differently: Apply 2 g topically as needed.)    diltiaZEM (CARDIZEM CD) 180 MG 24 hr capsule Take 1 capsule (180 mg total) by mouth once daily.    ferrous sulfate (FEOSOL) 325 mg (65 mg iron) Tab tablet Take 325 mg by mouth once daily.    fish oil-omega-3 fatty acids 300-1,000 mg capsule Take 2 g by mouth every evening.    fluconazole (DIFLUCAN) 150 MG Tab Take 1 and may repeat 8 h  prn (Patient not taking: Reported on 4/14/2022)    fluticasone (FLONASE) 50 mcg/actuation nasal spray 2 sprays by Each Nare route once daily. (Patient taking differently: 2 sprays by Each Nostril route as needed.)    fluticasone-umeclidin-vilanter (TRELEGY ELLIPTA) 200-62.5-25 mcg inhaler Inhale 1 puff into the lungs once daily.    folic acid-vit B6-vit B12 2.5-25-2 mg (FOLBIC) 2.5-25-2 mg Tab Take 1 tablet by  mouth once daily.    furosemide (LASIX) 20 MG tablet Take 1 tablet (20 mg total) by mouth as needed. Take 1 tablet only as needed, for swelling, increase SOB, weight gain of 3 lbs overnight or 5 lbs for the week    levothyroxine (SYNTHROID) 88 MCG tablet Take 1 tablet by mouth once daily (Patient taking differently: Take 88 mcg by mouth before breakfast.)    losartan (COZAAR) 25 MG tablet Take 1 tablet (25 mg total) by mouth once daily.    magnesium oxide (MAG-OX) 400 mg (241.3 mg magnesium) tablet Take 1 tablet by mouth once daily (Patient taking differently: Take 400 mg by mouth once daily.)    meclizine (ANTIVERT) 25 mg tablet Take 1 tablet (25 mg total) by mouth 3 (three) times daily as needed.    miconazole nitrate (MONISTAT 3) 200 mg Supp Place 1 suppository (200 mg total) vaginally every evening.    montelukast (SINGULAIR) 10 mg tablet Take 1 tablet (10 mg total) by mouth every evening.    multivitamin (THERAGRAN) tablet Take 1 tablet by mouth once daily.    nitroGLYCERIN (NITROSTAT) 0.4 MG SL tablet Place 1 tablet (0.4 mg total) under the tongue every 5 (five) minutes as needed for Chest pain. As needed (Patient taking differently: Place 0.4 mg under the tongue every 5 (five) minutes as needed for Chest pain. Seek medical help if pain is not relieved after the third dose.)    nystatin (MYCOSTATIN) powder Apply topically 4 (four) times daily.    omeprazole (PRILOSEC) 40 MG capsule Take 1 capsule by mouth once daily (Patient taking differently: Take 40 mg by mouth once daily.)    polyethylene glycol (GLYCOLAX) 17 gram/dose powder Take 17 g by mouth 2 (two) times a day.    rivaroxaban (XARELTO) 10 mg Tab Take 10 mg by mouth daily with dinner or evening meal. HCS    rosuvastatin (CRESTOR) 20 MG tablet Take 1 tablet (20 mg total) by mouth once daily.    sertraline (ZOLOFT) 25 MG tablet Take 1 tablet (25 mg total) by mouth once daily.    tamsulosin (FLOMAX) 0.4 mg Cap Take 1 capsule (0.4 mg total)  by mouth once daily.    vitamin D 1000 units Tab Take 1 tablet (1,000 Units total) by mouth once daily.     No current facility-administered medications for this visit.         Review of Systems:  Review of Systems   Constitutional: Negative for chills, fatigue and fever.   HENT: Negative for congestion.    Eyes: Negative for visual disturbance.   Respiratory: Negative for cough and shortness of breath.    Cardiovascular: Negative for chest pain and palpitations.   Gastrointestinal: Negative for abdominal distention, abdominal pain, constipation, diarrhea, nausea and vomiting.   Genitourinary: Negative for difficulty urinating, dysuria, hematuria, vaginal bleeding and vaginal discharge.   Skin: Negative for rash.   Neurological: Negative for dizziness, seizures, light-headedness and headaches.   Hematological: Does not bruise/bleed easily.   Psychiatric/Behavioral: Negative for dysphoric mood. The patient is not nervous/anxious.         OBJECTIVE:     Physical Exam:  Physical Exam  Vitals reviewed.   Constitutional:       General: She is not in acute distress.     Appearance: Normal appearance. She is well-developed.   HENT:      Head: Normocephalic and atraumatic.   Cardiovascular:      Rate and Rhythm: Normal rate and regular rhythm.   Pulmonary:      Effort: Pulmonary effort is normal.   Abdominal:      General: There is no distension.      Palpations: Abdomen is soft.   Genitourinary:     Vagina: Normal.      Comments: Lichen scerolsus noted. Vagina without lesion, cervix flush no bleeding noted.   Skin:     General: Skin is warm.   Neurological:      Mental Status: She is alert and oriented to person, place, and time.   Psychiatric:         Behavior: Behavior normal.         Thought Content: Thought content normal.         Judgment: Judgment normal.           ASSESSMENT:       ICD-10-CM ICD-9-CM    1. Lichen sclerosus  L90.0 701.0    2. Vaginal pain  R10.2 625.9 Ambulatory referral/consult to Obstetrics /  Gynecology       No orders of the defined types were placed in this encounter.          Plan:      - clobetasol cream x 2 weeks  - barrier cream when on diaper     Consuelo Tam M.D.  Obstetrics and Gynecology

## 2022-04-25 NOTE — TELEPHONE ENCOUNTER
Left voicemail to remind patient about needed blood work for retacrit injection. Call back numbers listed.

## 2022-04-26 NOTE — PLAN OF CARE
Problem: Fatigue  Goal: Improved Activity Tolerance  Intervention: Promote Improved Energy  Flowsheets (Taken 4/26/2022 1147)  Fatigue Management: fatigue-related activity identified

## 2022-05-04 NOTE — PROGRESS NOTES
5/4/2022    Blaire Cage     Follow-up    Chief Complaint   Patient presents with    Follow-up     3 months       HPI:   05/04/2022- tx for exacerbation 3/27/22 with obs in hospital. Daughter is with her today and assists w/ history.  Uses 3L oxygen at home, paramedics turned it up higher when she was sick was on 4L. Has very long O2 tubing allowing her to walk all over the house w/ home concentrator.  She denies cough/phlegm now.   Continues trelegy 200 daily, uses nebulizer bid, rescue inhaler doesn't use.  Has never tried biologics for asthma  Smokes 3 cigarettes per day and has no interest in quitting.  She gets around w/ walker and sits on seat, not very active but says it's not due to breathing trouble.    02/02/2022-   Continue trelegy one puff daily  Albuterol nebulizer twice daily  mucinex if getting congested again  Repeat chest x-ray make sure right lung clears up  pt coming for follow up visit. She was observed in the hospital 1/13/22 for acute bronchitis. She took a course of steroids and doxycycline. She feels better now but started again last night w/ dry cough per daughter. She c/o constipation.  Home health nurses come every 2 wks. When exacerbation started she was just taking plain mucinex then seemed better, then came back worse.  She did have covid in 9/2021.   Uses trelegy daily  Uses albuterol nebs BID    02/09/2021- no changes in her breathing or exacerbations.  Her daughter visits daily to check on her. Has covid vaccine scheduled Fri.  trelegy inhaler daily  Does nebs BID  Wears O2 at night    08/05/2020-   Keep doing trelegy and nebulizer medicines  Follow up with dr. Herbert  Continue activity as you can  Get vaccines- flu shot in fall  patient is a 89-year-old female with COPD, history of DVT/PE on Eliquis  Inhalers: trelegy, duo neb- uses 3x/day. Overall doing well without major complaints. Only WEST w/ activity but denies cough or phlegm.  Reports tongue is numb she thinks from  trelegy. She does some activity in the house w/ ADLs but slows down and takes breaks when SOB. Mops the floor of one room then takes a break before starting next. When wakes up in the am she has some sinus congestion and clears throat. No exacerbations. Due to f/u with Dr. Herbert this week. Reports was getting some testing for carotid artery stenosis. Daughter is with her-  in [place]- and assists w/ pt. Pt lives with her.    1/21/2020-trelegy used but expensive, used prednisone once for flu- cleared nicely with 3 days.  No falls - uses roller walker.  Lives alone with visits from daughters daily. Sleeps with oxygen -     July 18,2019-fell with hip fx - had rehab then fell again 2 wks ago.  Uses prednisone once or twice?  No advair as not covered.  Cannot recall singulair  Patient Instructions   Use budesonide in nebulizer if covered. Use twice daily (once better than none- will prevent but not improve).  May need prednisone to keep lungs stable, should be better with regular use budesonide.     1/8/2019- no prednisone recently, has cough with mucous yellow to clear now- lungs wors[facility] wks ago [facility]    Discussed with patient above for education the following:    Get fluticasone - salmeterol inhaler and use 1 twice daily - if covered.  May prevent lungs from getting sick.  Use prednisone daily for 3 days if bronchitis flares.  If yellow mucous may need antibiotic.   July 2, 2018- had pe /dvt dx early June by v/q and u/s.  Took prednisone just once.    Discussed with patient above for education the following:    Use prednisone if needed. Lungs are doing well,   You had had foot swelling but no symptoms for 2 weeks- blood clot in leg went to lung.    Stay on blood thinners, usually 6 months minimum course, may be wise to stay on therapy?    Dec 20, 2017Smoked late life, no h/o asthma.  Lives alone with daughters /grandkids in and out.   No 02- off smokes a yr.  Has  sinus problems chr with good flonase response.  Mucous is clear with no c/o excess cough and no wheezes.  Breathing felt to be good usually.  Uses resp rx bid.  No hosp for lungs- h/o  Fainting.  Has had very positive result from prednisone.    The chief compliant  problem is stable   PFSH:  Past Medical History:   Diagnosis Date    Anemia due to multiple mechanisms 6/29/2018    Anemia, chronic disease 6/29/2018    Anemia, chronic renal failure, stage 3 (moderate) 6/29/2018    Anticoagulant long-term use     aspirin    Aortic aneurysm     Chest pain     CHF (congestive heart failure)     COPD (chronic obstructive pulmonary disease)     COPD with acute exacerbation 1/9/2015    Depression     Diabetes mellitus type II     no longer on any DM meds    DVT (deep venous thrombosis) 06/09/2018    Encounter for blood transfusion     GERD (gastroesophageal reflux disease)     GI bleed     Gout     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 8/7/2018    Hip arthritis 3/1/2016    Homocysteinemia 8/7/2018    Hyperlipidemia     Hypertension     Incontinent of urine     Normocytic normochromic anemia 6/29/2018    Oxygen dependent     use oxygen as needed    PE (pulmonary thromboembolism) 06/09/2018    Pneumonia of right lower lobe due to infectious organism 9/11/2017    Syncope     Thyroid disease     hypothyroid    Type 2 diabetes mellitus with stage 3 chronic kidney disease 1/18/2013    UTI (urinary tract infection)          Past Surgical History:   Procedure Laterality Date    APPENDECTOMY      CHOLECYSTECTOMY      COLONOSCOPY Left 10/29/2020    Procedure: COLONOSCOPY;  Surgeon: Singh Kee III, MD;  Location: [facility];  Service: Endoscopy;  Laterality: Left;    ESOPHAGOGASTRODUODENOSCOPY Left 10/26/2020    Procedure: EGD (ESOPHAGOGASTRODUODENOSCOPY);  Surgeon: Kareem Gramajo MD;  Location: [facility];  Service: Endoscopy;  Laterality: Left;    ESOPHAGOGASTRODUODENOSCOPY Left 10/28/2020     Procedure: EGD (ESOPHAGOGASTRODUODENOSCOPY);  Surgeon: Kareem Gramajo MD;  Location: [facility] ENDO;  Service: Endoscopy;  Laterality: Left;    ESOPHAGOGASTRODUODENOSCOPY N/A 9/16/2021    Procedure: EGD (ESOPHAGOGASTRODUODENOSCOPY);  Surgeon: Kareem Gramajo MD;  Location: [facility] ENDO;  Service: Endoscopy;  Laterality: N/A;    FRACTURE SURGERY      right hip     HERNIA REPAIR      groin    HYSTERECTOMY      INSERTION OF IMPLANTABLE LOOP RECORDER N/A 12/12/2018    Procedure: Insertion, Implantable Loop Recorder;  Surgeon: Ashok Herbert MD;  Location: [facility] CATH LAB;  Service: Cardiology;  Laterality: N/A;    PERCUTANEOUS PINNING OF HIP Left 3/23/2019    Procedure: PINNING, HIP, PERCUTANEOUS;  Surgeon: Jorje Hamlin MD;  Location: [facility] OR;  Service: Orthopedics;  Laterality: Left;    SMALL BOWEL ENTEROSCOPY N/A 10/29/2020    Procedure: ENTEROSCOPY;  Surgeon: Singh Kee III, MD;  Location: [facility] ENDO;  Service: Endoscopy;  Laterality: N/A;    VARICOSE VEIN SURGERY       Social History     Tobacco Use    Smoking status: Former Smoker     Packs/day: 0.35     Years: 44.00     Pack years: 15.40     Types: Cigarettes     Start date: 1970    Smokeless tobacco: Never Used    Tobacco comment: 1/08/2015   Substance Use Topics    Alcohol use: No     Alcohol/week: 0.0 standard drinks    Drug use: No     Family History   Problem Relation Age of Onset    Hypertension Mother     Heart disease Mother     Lung disease Father     Diabetes Sister     Diabetes Brother     Kidney disease Son     Breast cancer Neg Hx     Ovarian cancer Neg Hx      Review of patient's allergies indicates:   Allergen Reactions    Carvedilol Other (See Comments)     Bradycardia and syncope    Boniva [ibandronate]     Codeine     Hydralazine analogues     Iodinated contrast- oral and iv dye Hives    Kionex [sodium polystyrene sulfonate]     Lisinopril     Morphine        Performance Status:The patient's activity level is  "functions out of house.      Review of Systems:  a review of eleven systems covering constitutional, Eye, HEENT, Psych, Respiratory, Cardiac, GI, , Musculoskeletal, Endocrine, Dermatologic was negative except for pertinent findings as listed ABOVE and below:   Leg swelling    Exam:Comprehensive exam done. BP (!) 162/67 (BP Location: Left arm, Patient Position: Sitting, BP Method: Small (Automatic))   Pulse 71   Resp 16   Ht 5' 3" (1.6 m)   Wt 54.9 kg (121 lb 2.3 oz)   LMP  (LMP Unknown)   SpO2 98% Comment: on room air at rest  BMI 21.46 kg/m²   Exam included Vitals as listed, and patient's appearance and affect and alertness and mood, oral exam for yeast and hygiene and pharynx lesions and Mallapatti (M) score, neck with inspection for jvd and masses and thyroid abnormalities and lymph nodes (supraclavicular and infraclavicular nodes and axillary also examined and noted if abn), chest exam included symmetry and effort and fremitus and percussion and auscultation, cardiac exam included rhythm and gallops and murmur and rubs and jvd and edema, abdominal exam for mass and hepatosplenomegaly and tenderness and hernias and bowel sounds, Musculoskeletal exam with muscle tone and posture and mobility/gait and  strength, and skin for rashes and cyanosis and pallor and turgor, extremity for clubbing.  Findings were normal except for pertinent findings listed below:  M1, thin  Diminished breath sounds w/ scattered wheezes  No clubbing or edema    Radiographs (ct chest and cxr) reviewed: results reviewed  -   CXR 3/26/22- stranding R base  CXR 1/13/22- R lower lung atelectasis, new  CXR 10/25/20- hyperinflation, slight R basilar infiltrate, bibasilar pleural irregularity  CXR 5/5/20- chronic scarring of bibasilar pleura, tortuosity of trachea, lungs clear    TTE 3/22/22-   · The estimated PA systolic pressure is 43 mmHg.  · The left ventricle is normal in size with mild concentric hypertrophy and normal systolic " function.  · The estimated ejection fraction is 65%.  · Grade II left ventricular diastolic dysfunction.  · Normal right ventricular size with normal right ventricular systolic function.  · Moderate mitral regurgitation.  · There is moderate mitral stenosis.  · There is moderate mitral leaflet calcification.  · Mild tricuspid regurgitation.  · There is mild pulmonary hypertension.  · There is mild aortic valve stenosis.  · Aortic valve area is 1.77 cm2; peak velocity is 2.08 m/s; mean gradient is 11 mmHg.  · Moderate left atrial enlargement.  · Mild right atrial enlargement.      Labs reviewed-    Latest Reference Range & Units 04/22/22 11:30   Sodium 136 - 145 mmol/L 142   Potassium 3.5 - 5.1 mmol/L 4.2   Chloride 95 - 110 mmol/L 108   CO2 23 - 29 mmol/L 23   Anion Gap 8 - 16 mmol/L 11   BUN 10 - 30 mg/dL 34 (H)   Creatinine 0.5 - 1.4 mg/dL 2.0 (H)   EGFR if non African American >60 mL/min/1.73 m^2 21.4 ! [1]   EGFR if African American >60 mL/min/1.73 m^2 24.6 !   Glucose 70 - 110 mg/dL 104   Calcium 8.7 - 10.5 mg/dL 8.5 (L)   Alkaline Phosphatase 55 - 135 U/L 103   PROTEIN TOTAL 6.0 - 8.4 g/dL 5.7 (L)   Albumin 3.5 - 5.2 g/dL 2.6 (L)   BILIRUBIN TOTAL 0.1 - 1.0 mg/dL 0.3 [2]   AST 10 - 40 U/L 59 (H)   ALT 10 - 44 U/L 58 (H)     Results for FLOYDSABRINA ARTIS (MRN [id number]) as of 2/9/2021 15:24   Ref. Range 11/9/2020 16:05 11/16/2020 12:53 11/16/2020 12:53   Eos # Latest Ref Range: 0.0 - 0.5 K/uL 1.7 (H) 0.9 (H) 0.9 (H)      Latest Reference Range & Units 03/27/22 05:12 04/11/22 11:06 04/25/22 14:58   Eos # 0.0 - 0.5 K/uL 0.0 0.4 0.2     PFT results reviewed  10/30/2017- obstruction with positive bd response. Airflow improves but does not normalize      6MWT 10/2019- desaturation to 88% improves on 3 L O2    Plan:  Clinical impression is apparently straight forward and impression with management as below. Severe COPD-asthma with 2 exacerbations already this year req steroids/abx, chronic hypoxemia, smoker. Not a  candidate for daliresp due to LFT abnormalities. With significant eosinophilia in past- will likely benefit from biologic to keep her out of the hospital w/ exacerbations.    Blaire was seen today for follow-up.    Diagnoses and all orders for this visit:    Asthma-COPD overlap syndrome    COPD exacerbation    Chronic respiratory failure with hypoxia  -     Six Minute Walk Test to qualify for Home Oxygen; Future    Eosinophilic asthma  -     mepolizumab 100 mg/mL AtIn; Inject 1 mL (100 mg total) into the skin every 28 days.        Follow up in about 3 months (around 8/4/2022).    Discussed with patient above for education the following:      Patient Instructions   Continue inhaler, nebulizer treatments  Frequent exacerbations are concerning  Start nucala shots for eosinophilic asthma with frequent exacerbations  Quit smoking recommended  Six min walk test to reassess needs and change oxygen prescription if needed

## 2022-05-04 NOTE — PATIENT INSTRUCTIONS
Continue inhaler, nebulizer treatments  Frequent exacerbations are concerning  Start nucala shots for eosinophilic asthma with frequent exacerbations  Quit smoking recommended  Six min walk test to reassess needs and change oxygen prescription if needed

## 2022-05-06 PROBLEM — N18.4 TYPE 2 DIABETES MELLITUS WITH STAGE 4 CHRONIC KIDNEY DISEASE, WITHOUT LONG-TERM CURRENT USE OF INSULIN: Status: ACTIVE | Noted: 2018-06-28

## 2022-05-06 PROBLEM — E11.22 TYPE 2 DIABETES MELLITUS WITH STAGE 4 CHRONIC KIDNEY DISEASE, WITHOUT LONG-TERM CURRENT USE OF INSULIN: Status: ACTIVE | Noted: 2018-06-28

## 2022-05-06 NOTE — PROGRESS NOTES
Subjective:    Patient ID:  Blaire Cage is a 89 y.o. female patient here for evaluation Deep Vein Thrombosis and Hypertension      History of Present Illness:       Ms. Cage is here for her follow up visit. She has been in the hospital 2 times since last visit. Her bp has been elevated but has since improved. She  Is using clonidine PRN SBP > 160.   Patient denies CP. She denies SOB. No headaches. No lightheaded or dizziness. No recent fever or chills. Only symptoms she tells me is boredom.       Review of patient's allergies indicates:   Allergen Reactions    Carvedilol Other (See Comments)     Bradycardia and syncope    Boniva [ibandronate]     Codeine     Hydralazine analogues     Iodinated contrast media Hives    Kionex [sodium polystyrene sulfonate] Diarrhea     From encounter 12/11/17 for diarrhea--not true allergy.    Lisinopril     Morphine        Past Medical History:   Diagnosis Date    Anemia due to multiple mechanisms 6/29/2018    Anemia, chronic disease 6/29/2018    Anemia, chronic renal failure, stage 3 (moderate) 6/29/2018    Anticoagulant long-term use     aspirin    Aortic aneurysm     Chest pain     CHF (congestive heart failure)     COPD (chronic obstructive pulmonary disease)     COPD with acute exacerbation 1/9/2015    Depression     Diabetes mellitus type II     no longer on any DM meds    DVT (deep venous thrombosis) 06/09/2018    Encounter for blood transfusion     GERD (gastroesophageal reflux disease)     GI bleed     Gout     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 8/7/2018    Hip arthritis 3/1/2016    Homocysteinemia 8/7/2018    Hyperlipidemia     Hypertension     Incontinent of urine     Normocytic normochromic anemia 6/29/2018    Oxygen dependent     use oxygen as needed    PE (pulmonary thromboembolism) 06/09/2018    Pneumonia of right lower lobe due to infectious organism 9/11/2017    Syncope     Thyroid disease     hypothyroid    Type 2  diabetes mellitus with stage 3 chronic kidney disease 1/18/2013    UTI (urinary tract infection)      Past Surgical History:   Procedure Laterality Date    APPENDECTOMY      CHOLECYSTECTOMY      COLONOSCOPY Left 10/29/2020    Procedure: COLONOSCOPY;  Surgeon: Singh Kee III, MD;  Location: [facility] ENDO;  Service: Endoscopy;  Laterality: Left;    ESOPHAGOGASTRODUODENOSCOPY Left 10/26/2020    Procedure: EGD (ESOPHAGOGASTRODUODENOSCOPY);  Surgeon: Kareem Gramajo MD;  Location: [facility] ENDO;  Service: Endoscopy;  Laterality: Left;    ESOPHAGOGASTRODUODENOSCOPY Left 10/28/2020    Procedure: EGD (ESOPHAGOGASTRODUODENOSCOPY);  Surgeon: Kareem Gramajo MD;  Location: [facility] ENDO;  Service: Endoscopy;  Laterality: Left;    ESOPHAGOGASTRODUODENOSCOPY N/A 9/16/2021    Procedure: EGD (ESOPHAGOGASTRODUODENOSCOPY);  Surgeon: Kareem Gramajo MD;  Location: [facility];  Service: Endoscopy;  Laterality: N/A;    FRACTURE SURGERY      right hip     HERNIA REPAIR      groin    HYSTERECTOMY      INSERTION OF IMPLANTABLE LOOP RECORDER N/A 12/12/2018    Procedure: Insertion, Implantable Loop Recorder;  Surgeon: Ashok Herbert MD;  Location: Health system CATH LAB;  Service: Cardiology;  Laterality: N/A;    PERCUTANEOUS PINNING OF HIP Left 3/23/2019    Procedure: PINNING, HIP, PERCUTANEOUS;  Surgeon: Jorje Hamlin MD;  Location: Health system OR;  Service: Orthopedics;  Laterality: Left;    SMALL BOWEL ENTEROSCOPY N/A 10/29/2020    Procedure: ENTEROSCOPY;  Surgeon: Singh Kee III, MD;  Location: [facility];  Service: Endoscopy;  Laterality: N/A;    VARICOSE VEIN SURGERY       Social History     Tobacco Use    Smoking status: Former Smoker     Packs/day: 0.35     Years: 44.00     Pack years: 15.40     Types: Cigarettes     Start date: 1970    Smokeless tobacco: Never Used    Tobacco comment: 1/08/2015   Substance Use Topics    Alcohol use: No     Alcohol/week: 0.0 standard drinks    Drug use: No        Review of Systems:     As noted in HPI in addition      REVIEW OF SYSTEMS  CARDIOVASCULAR: No recent chest pain, palpitations, arm, neck, or jaw pain  RESPIRATORY: No recent fever, cough chills, SOB or congestion  : No blood in the urine  GI: No Nausea, vomiting, constipation, diarrhea, blood, or reflux.  MUSCULOSKELETAL: No myalgias  NEURO: No lightheadedness or dizziness  EYES: No Double vision, blurry, vision or headache              Objective        Vitals:    05/06/22 1500   BP: (!) 140/60   Pulse: 72       LIPIDS - LAST 2   Lab Results   Component Value Date    CHOL 207 (H) 12/09/2020    CHOL 154 05/09/2020    HDL 69 12/09/2020    HDL 54 05/09/2020    LDLCALC 111.6 12/09/2020    LDLCALC 83.0 05/09/2020    TRIG 132 12/09/2020    TRIG 85 05/09/2020    CHOLHDL 33.3 12/09/2020    CHOLHDL 35.1 05/09/2020       CBC - LAST 2  Lab Results   Component Value Date    WBC 5.90 04/25/2022    WBC 9.03 04/11/2022    RBC 2.78 (L) 04/25/2022    RBC 3.02 (L) 04/11/2022    HGB 8.9 (L) 04/25/2022    HGB 9.5 (L) 04/11/2022    HCT 27.7 (L) 04/25/2022    HCT 29.8 (L) 04/11/2022     (H) 04/25/2022    MCV 99 (H) 04/11/2022    MCH 32.0 (H) 04/25/2022    MCH 31.5 (H) 04/11/2022    MCHC 32.1 04/25/2022    MCHC 31.9 (L) 04/11/2022    RDW 14.0 04/25/2022    RDW 14.2 04/11/2022     04/25/2022     04/11/2022    MPV 9.4 04/25/2022    MPV 11.0 04/11/2022    GRAN 4.2 04/25/2022    GRAN 70.9 04/25/2022    LYMPH 0.9 (L) 04/25/2022    LYMPH 15.6 (L) 04/25/2022    MONO 0.6 04/25/2022    MONO 9.3 04/25/2022    BASO 0.04 04/25/2022    BASO 0.05 04/11/2022    NRBC 0 04/25/2022    NRBC 0 04/11/2022       CHEMISTRY & LIVER FUNCTION - LAST 2  Lab Results   Component Value Date     04/22/2022     03/27/2022    K 4.2 04/22/2022    K 4.5 03/27/2022     04/22/2022     03/27/2022    CO2 23 04/22/2022    CO2 24 03/27/2022    ANIONGAP 11 04/22/2022    ANIONGAP 10 03/27/2022    BUN 34 (H) 04/22/2022    BUN 55 (H) 03/27/2022     CREATININE 2.0 (H) 04/22/2022    CREATININE 1.9 (H) 03/27/2022     04/22/2022     (H) 03/27/2022    CALCIUM 8.5 (L) 04/22/2022    CALCIUM 8.6 (L) 03/27/2022    PH 7.396 01/08/2015    MG 1.8 03/21/2022    MG 1.9 01/14/2022    ALBUMIN 2.6 (L) 04/22/2022    ALBUMIN 2.7 (L) 03/26/2022    PROT 5.7 (L) 04/22/2022    PROT 5.2 (L) 03/26/2022    ALKPHOS 103 04/22/2022    ALKPHOS 78 03/26/2022    ALT 58 (H) 04/22/2022    ALT 31 03/26/2022    AST 59 (H) 04/22/2022    AST 31 03/26/2022    BILITOT 0.3 04/22/2022    BILITOT 0.6 03/26/2022        CARDIAC PROFILE - LAST 2  Lab Results   Component Value Date     (H) 03/26/2022     (H) 03/21/2022     03/26/2022     (H) 01/13/2022    CPKMB 3.1 03/26/2022    CPKMB 6.0 01/13/2022    TROPONINI <0.030 03/26/2022    TROPONINI <0.030 03/26/2022        COAGULATION - LAST 2  Lab Results   Component Value Date    LABPT 16.5 (H) 10/19/2021    LABPT 17.9 (H) 09/21/2021    INR 1.4 10/19/2021    INR 1.6 09/21/2021    APTT 36.5 (H) 10/19/2021    APTT 40.1 (H) 08/03/2021       ENDOCRINE & PSA - LAST 2  Lab Results   Component Value Date    HGBA1C 5.7 (H) 04/22/2022    HGBA1C 7.1 (H) 02/07/2022    TSH 2.697 04/22/2022    TSH 2.510 03/22/2022    PROCAL 0.18 01/13/2022        ECHOCARDIOGRAM RESULTS  Results for orders placed during the hospital encounter of 03/21/22    Echo    Interpretation Summary  · The estimated PA systolic pressure is 43 mmHg.  · The left ventricle is normal in size with mild concentric hypertrophy and normal systolic function.  · The estimated ejection fraction is 65%.  · Grade II left ventricular diastolic dysfunction.  · Normal right ventricular size with normal right ventricular systolic function.  · Moderate mitral regurgitation.  · There is moderate mitral stenosis.  · There is moderate mitral leaflet calcification.  · Mild tricuspid regurgitation.  · There is mild pulmonary hypertension.  · There is mild aortic valve stenosis.  ·  Aortic valve area is 1.77 cm2; peak velocity is 2.08 m/s; mean gradient is 11 mmHg.  · Moderate left atrial enlargement.  · Mild right atrial enlargement.      CURRENT/PREVIOUS VISIT EKG  Results for orders placed or performed during the hospital encounter of 03/26/22   EKG 12-lead    Collection Time: 03/26/22  8:43 AM    Narrative    Test Reason : R07.9,    Vent. Rate : 062 BPM     Atrial Rate : 062 BPM     P-R Int : 188 ms          QRS Dur : 094 ms      QT Int : 446 ms       P-R-T Axes : 050 -06 059 degrees     QTc Int : 452 ms    Normal sinus rhythm with sinus arrhythmia  Normal ECG  When compared with ECG of 21-MAR-2022 13:55,  No significant change was found  Confirmed by Watson BEST, Luis Carlos LAWRENCE ([id number]) on 3/26/2022 9:54:45 AM    Referred By: CELESTE   SELF           Confirmed By:Luis Carlos Chaudhari MD         PHYSICAL EXAM  CONSTITUTIONAL: Well built, well nourished in no apparent distress  NECK: no carotid bruit, no JVD  LUNGS: soft expiratory wheezing that cleared with cough  CHEST WALL: no tenderness  HEART: regular rate and rhythm, S1, S2 normal, systolic murmur noted  ABDOMEN: soft, non-tender; bowel sounds normal; no masses,  no organomegaly  EXTREMITIES: Extremities normal, no edema, no calf tenderness noted  NEURO: AAO X 3    I HAVE REVIEWED :    The vital signs, nurses notes, and all the pertinent radiology and labs.        Current Outpatient Medications   Medication Instructions    acetaminophen (TYLENOL) 325 mg, Oral, Every 6 hours PRN    albuterol (PROVENTIL) 2.5 mg /3 mL (0.083 %) nebulizer solution USE 1 VIAL IN NEBULIZER EVERY 6 HOURS AS NEEDED FOR WHEEZING. RESCUE    allopurinoL (ZYLOPRIM) 100 mg, Oral, Daily    ascorbic acid (vitamin C) (VITAMIN C) 500 mg, Oral, Daily,      calcium carbonate (OS-TASIA) 500 mg calcium (1,250 mg) tablet 1 tablet, Oral, 2 times daily,      ciclopirox (PENLAC) 8 % Soln Topical (Top), Nightly    clobetasol 0.05% (TEMOVATE) 0.05 % Oint Topical (Top), 2 times daily     cloNIDine (CATAPRES) 0.1 mg, Oral, 3 times daily    diclofenac sodium (VOLTAREN) 2 g, Topical (Top), Daily    diltiaZEM (CARDIZEM CD) 180 mg, Oral, Daily    ferrous sulfate (FEOSOL) 325 mg, Oral, Daily    fish oil-omega-3 fatty acids 300-1,000 mg capsule 2 g, Oral, Nightly,      fluconazole (DIFLUCAN) 150 MG Tab Take 1 and may repeat 8 h  prn    fluticasone (FLONASE) 50 mcg/actuation nasal spray 2 sprays, Each Nostril, Daily    fluticasone-umeclidin-vilanter (TRELEGY ELLIPTA) 200-62.5-25 mcg inhaler 1 puff, Inhalation, Daily    folic acid-vit B6-vit B12 2.5-25-2 mg (FOLBIC) 2.5-25-2 mg Tab 1 tablet, Oral, Daily    furosemide (LASIX) 20 mg, Oral, As needed (PRN), Take 1 tablet only as needed, for swelling, increase SOB, weight gain of 3 lbs overnight or 5 lbs for the week    levothyroxine (SYNTHROID) 88 MCG tablet Take 1 tablet by mouth once daily    magnesium oxide (MAG-OX) 400 mg (241.3 mg magnesium) tablet Take 1 tablet by mouth once daily    meclizine (ANTIVERT) 25 mg, Oral, 3 times daily PRN    mepolizumab 100 mg, Subcutaneous, Every 28 days    miconazole nitrate (MONISTAT 3) 200 mg, Vaginal, Nightly    montelukast (SINGULAIR) 10 mg, Oral, Nightly    multivitamin (THERAGRAN) tablet 1 tablet, Oral, Daily    nitroGLYCERIN (NITROSTAT) 0.4 mg, Sublingual, Every 5 min PRN, As needed    nystatin (MYCOSTATIN) powder Topical (Top), 4 times daily    omeprazole (PRILOSEC) 40 MG capsule Take 1 capsule by mouth once daily    polyethylene glycol (GLYCOLAX) 17 g, Oral, 2 times daily    rivaroxaban (XARELTO) 10 mg, Oral, With dinner, HCS    rosuvastatin (CRESTOR) 20 mg, Oral, Daily    sertraline (ZOLOFT) 25 mg, Oral, Daily    vitamin D (VITAMIN D3) 1,000 Units, Oral, Daily          Assessment & Plan     Hypertension associated with diabetes  BP stable currently 140/60  Patient is using clinidine PRN SBP > 160    COPD exacerbation  Followed by Pulmonary    CKD (chronic kidney disease), stage IV  Follow  with Dr. Jo      History of pulmonary embolism  Continue Xarelto 10 mg daily          No follow-ups on file.

## 2022-05-10 NOTE — PLAN OF CARE
Problem: Fatigue  Goal: Improved Activity Tolerance  Intervention: Promote Improved Energy  Flowsheets (Taken 5/10/2022 2180)  Fatigue Management:   fatigue-related activity identified   frequent rest breaks encouraged

## 2022-05-17 NOTE — TELEPHONE ENCOUNTER
Referral activities not pending properly.     Sebastian Barragan, PharmD  Clinical Pharmacist  [facility]  P: [phone]

## 2022-05-17 NOTE — TELEPHONE ENCOUNTER
PA for Nucala required. ePA submitted as urgent. CM key: [id number]    Sebastian Barragan, PharmD  Clinical Pharmacist  [facility]  P: [phone]

## 2022-05-23 NOTE — TELEPHONE ENCOUNTER
5/24/2022 spoke with Emil again. [facility] provided her with incorrect information. Patient is not out of contract until September of next year. Patient will not receive POC at this time. Will reevaluate next year.       Spoke with Emil from [facility]. Patient has been approved for new equipment, but cannot receive until September 19th due to her current contract almost being over.       ----- Message from Taryn Momin sent at 5/23/2022 12:53 PM CDT -----  Contact: Emil mcnally/[facility] is asking for a call back in regards to the order for oxygen for this patient.  Call back at [phone] and thanks

## 2022-05-23 NOTE — TELEPHONE ENCOUNTER
Request Reference Number: [id number]. NUCALA INJ 100MG/ML is approved through 11/17/2022. Your patient may now fill this prescription and it will be covered.    Copay $9.85    LIS Level 1     Forwarding to initial

## 2022-05-26 NOTE — TELEPHONE ENCOUNTER
Call for Nucala initial consult. No answer, LVM.    Sebastian Barragan, PharmD  Clinical Pharmacist  [facility]  P: [phone]

## 2022-05-26 NOTE — TELEPHONE ENCOUNTER
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 female, who is followed by the specialty pharmacy service for management and education.    Recent Encounters     Date Type Provider Description    05/26/2022 Specialty Pharmacy Sebastian Barragan PharmD Initial Clinical Assessment    05/17/2022 Specialty Pharmacy Sebastian Barragan PharmD Referral Authorization        Clinical call attempts since last clinical assessment   5/26/2022  6:06 PM - Specialty Pharmacy - Clinical Assessment by Sebastian Barragan PharmD     Current Outpatient Medications   Medication Sig    acetaminophen (TYLENOL) 325 MG tablet Take 325 mg by mouth every 6 (six) hours as needed for Pain.    albuterol (PROVENTIL) 2.5 mg /3 mL (0.083 %) nebulizer solution USE 1 VIAL IN NEBULIZER EVERY 6 HOURS AS NEEDED FOR WHEEZING. RESCUE (Patient taking differently: Take 2.5 mg by nebulization every 6 (six) hours as needed for Wheezing or Shortness of Breath. USE 1 VIAL IN NEBULIZER EVERY 6 HOURS AS NEEDED FOR WHEEZING. RESCUE)    allopurinoL (ZYLOPRIM) 100 MG tablet Take 100 mg by mouth once daily.    ascorbic acid, vitamin C, (VITAMIN C) 500 MG tablet Take 500 mg by mouth once daily.    calcium carbonate (OS-TASIA) 500 mg calcium (1,250 mg) tablet Take 1 tablet by mouth 2 (two) times daily.    ciclopirox (PENLAC) 8 % Soln Apply topically nightly.    clobetasol 0.05% (TEMOVATE) 0.05 % Oint Apply topically 2 (two) times daily.    cloNIDine (CATAPRES) 0.1 MG tablet Take 1 tablet (0.1 mg total) by mouth 3 (three) times daily.    diclofenac sodium (VOLTAREN) 1 % Gel Apply 2 g topically once daily. (Patient taking differently: Apply 2 g topically as needed.)    diltiaZEM (CARDIZEM CD) 180 MG 24 hr  capsule Take 1 capsule (180 mg total) by mouth once daily.    ferrous sulfate (FEOSOL) 325 mg (65 mg iron) Tab tablet Take 325 mg by mouth once daily.    fish oil-omega-3 fatty acids 300-1,000 mg capsule Take 2 g by mouth every evening.    fluconazole (DIFLUCAN) 150 MG Tab Take 1 and may repeat 8 h  prn (Patient taking differently: Take 1 and may repeat 8 h  prn)    fluticasone (FLONASE) 50 mcg/actuation nasal spray 2 sprays by Each Nare route once daily. (Patient taking differently: 2 sprays by Each Nostril route as needed.)    fluticasone-umeclidin-vilanter (TRELEGY ELLIPTA) 200-62.5-25 mcg inhaler Inhale 1 puff into the lungs once daily.    folic acid-vit B6-vit B12 2.5-25-2 mg (FOLBIC) 2.5-25-2 mg Tab Take 1 tablet by mouth once daily.    furosemide (LASIX) 20 MG tablet Take 1 tablet (20 mg total) by mouth as needed. Take 1 tablet only as needed, for swelling, increase SOB, weight gain of 3 lbs overnight or 5 lbs for the week    levothyroxine (SYNTHROID) 88 MCG tablet Take 1 tablet by mouth once daily (Patient taking differently: Take 88 mcg by mouth before breakfast.)    magnesium oxide (MAG-OX) 400 mg (241.3 mg magnesium) tablet Take 1 tablet by mouth once daily (Patient taking differently: Take 400 mg by mouth once daily.)    meclizine (ANTIVERT) 25 mg tablet Take 1 tablet (25 mg total) by mouth 3 (three) times daily as needed.    mepolizumab 100 mg/mL AtIn Inject 1 mL (100 mg total) into the skin every 28 days.    miconazole nitrate (MONISTAT 3) 200 mg Supp Place 1 suppository (200 mg total) vaginally every evening.    montelukast (SINGULAIR) 10 mg tablet Take 1 tablet (10 mg total) by mouth every evening.    multivitamin (THERAGRAN) tablet Take 1 tablet by mouth once daily.    nitroGLYCERIN (NITROSTAT) 0.4 MG SL tablet Place 1 tablet (0.4 mg total) under the tongue every 5 (five) minutes as needed for Chest pain. As needed (Patient taking differently: Place 0.4 mg under the tongue every 5  (five) minutes as needed for Chest pain. Seek medical help if pain is not relieved after the third dose.)    nystatin (MYCOSTATIN) powder Apply topically 4 (four) times daily.    omeprazole (PRILOSEC) 40 MG capsule Take 1 capsule by mouth once daily (Patient taking differently: Take 40 mg by mouth once daily.)    polyethylene glycol (GLYCOLAX) 17 gram/dose powder Take 17 g by mouth 2 (two) times a day.    rivaroxaban (XARELTO) 10 mg Tab Take 10 mg by mouth daily with dinner or evening meal. HCS    rosuvastatin (CRESTOR) 20 MG tablet Take 1 tablet (20 mg total) by mouth once daily.    sertraline (ZOLOFT) 25 MG tablet Take 1 tablet (25 mg total) by mouth once daily.    vitamin D 1000 units Tab Take 1 tablet (1,000 Units total) by mouth once daily.   Last reviewed on 5/6/2022  3:05 PM by Mary Brower MA    Review of patient's allergies indicates:   Allergen Reactions    Carvedilol Other (See Comments)     Bradycardia and syncope    Boniva [ibandronate]     Codeine     Hydralazine analogues     Iodinated contrast media Hives    Kionex [sodium polystyrene sulfonate] Diarrhea     From encounter 12/11/17 for diarrhea--not true allergy.    Lisinopril     Morphine    Last reviewed on  5/10/2022 2:48 PM by Domonique Norman    Drug Interactions    Drug interactions evaluated: yes  Clinically relevant drug interactions identified: no  Provided the patient with educational material regarding drug interactions: not applicable         Adverse Effects    *All other systems reviewed and are negative       Assessment Questions - Documented Responses    Flowsheet Row Most Recent Value   Assessment    Medication Reconciliation completed for patient No   During the past 4 weeks, has patient missed any activities due to condition or medication? No   During the past 4 weeks, did patient have any of the following urgent care visits? None   Goals of Therapy Status Discussed (new start)   Status of the patients ability to  self-administer: Is Able   All education points have been covered with patient? Yes, supplemental printed education provided   Welcome packet contents reviewed and discussed with patient? Yes   Assesment completed? Yes   Plan Therapy being initiated   Do you need to open a clinical intervention (i-vent)? No   Do you want to schedule first shipment? Yes   Medication #1 Assessment Info    Patient status New medication, New to [facility]   Is this medication appropriate for the patient? Yes   Is this medication effective? Not yet started        Refill Questions - Documented Responses    Flowsheet Row Most Recent Value   Patient Availability and HIPAA Verification    Does patient want to proceed with activity? Yes   HIPAA/medical authority confirmed? Yes   Relationship to patient of person spoken to? Self   Refill Screening Questions    When does the patient need to receive the medication? 06/01/22   Refill Delivery Questions    How will the patient receive the medication? Delivery Ronda   When does the patient need to receive the medication? 06/01/22   Shipping Address Home   Address in [facility] confirmed and updated if neccessary? Yes   Expected Copay ($) 9.85   Is the patient able to afford the medication copay? Yes   Payment Method new CC added to file   Days supply of Refill 28   Supplies needed? No supplies needed   Refill activity completed? Yes   Refill activity plan Refill scheduled   Shipment/Pickup Date: 05/31/22          Objective    She has a past medical history of Anemia due to multiple mechanisms (6/29/2018), Anemia, chronic disease (6/29/2018), Anemia, chronic renal failure, stage 3 (moderate) (6/29/2018), Anticoagulant long-term use, Aortic aneurysm, Chest pain, CHF (congestive heart failure), COPD (chronic obstructive pulmonary disease), COPD with acute exacerbation (1/9/2015), Depression, Diabetes mellitus type II, DVT (deep venous thrombosis) (06/09/2018), Encounter for blood transfusion, GERD  "(gastroesophageal reflux disease), GI bleed, Gout, 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 (8/7/2018), Hip arthritis (3/1/2016), Homocysteinemia (8/7/2018), Hyperlipidemia, Hypertension, Incontinent of urine, Normocytic normochromic anemia (6/29/2018), Oxygen dependent, PE (pulmonary thromboembolism) (06/09/2018), Pneumonia of right lower lobe due to infectious organism (9/11/2017), Syncope, Thyroid disease, Type 2 diabetes mellitus with stage 3 chronic kidney disease (1/18/2013), and UTI (urinary tract infection).      BP Readings from Last 4 Encounters:   05/10/22 129/60   05/06/22 (!) 140/60   05/04/22 (!) 162/67   04/26/22 (!) 165/59     Ht Readings from Last 4 Encounters:   05/10/22 5' 3" (1.6 m)   05/06/22 5' 3" (1.6 m)   05/04/22 5' 3" (1.6 m)   04/26/22 5' 3" (1.6 m)     Wt Readings from Last 4 Encounters:   05/10/22 55.3 kg (122 lb)   05/06/22 54.4 kg (120 lb)   05/04/22 54.9 kg (121 lb 2.3 oz)   04/26/22 56.2 kg (124 lb)       The goals of prescribed drug therapy management include:  · Supporting patient to meet the prescriber's medical treatment objectives  · Improving or maintaining quality of life  · Maintaining optimal therapy adherence  · Minimizing and managing side effects      Goals of Therapy Status: Discussed (new start)    Assessment/Plan  Patient plans to start therapy on 06/01/22      Indication, dosage, appropriateness, effectiveness, safety and convenience of her specialty medication(s) were reviewed today.     Patient Education   Patient received education on the following:    Expectations and possible outcomes of therapy   Proper use, timely administration, and missed dose management   Duration of therapy   Side effects, including prevention, minimization, and management   Contraindications and safety precautions   New or changed medications, including prescribe and over the counter medications and supplements   Reviews recommended vaccinations, as appropriate   Storage, safe handling, " and disposal        Tasks added this encounter   6/22/2022 - Refill Call (Auto Added)  2/26/2023 - Clinical - Follow Up Assesement (Annual)   Tasks due within next 3 months   No tasks due.     Sebastian Barragan, PharmD  [facility]  [address]  [facility] [address]  Phone: [phone]  Fax: [phone]

## 2022-05-26 NOTE — TELEPHONE ENCOUNTER
Patient's daughter called back for Nucala initial consult. Daughter accepted consult, but states that her sister will pay for the copay. She will have sister call [facility] to set up payment and shipment.     Sebastian Barragan, PharmD  Clinical Pharmacist  [facility]  P: [phone]

## 2022-06-21 NOTE — TELEPHONE ENCOUNTER
Notified patient that she is canceled for retacrit injection. Hgb 10.0. patient rescheduled to 7/6/22

## 2022-06-22 NOTE — TELEPHONE ENCOUNTER
Specialty Pharmacy - Refill Coordination    Specialty Medication Orders Linked to Encounter    Flowsheet Row Most Recent Value   Medication #1 mepolizumab 100 mg/mL AtIn (Order#[id number], Rx#[id number])          Refill Questions - Documented Responses    Flowsheet Row Most Recent Value   Patient Availability and HIPAA Verification    Does patient want to proceed with activity? Yes   HIPAA/medical authority confirmed? Yes   Relationship to patient of person spoken to? Child   Refill Screening Questions    Changes to allergies? No   Changes to medications? No   New conditions since last clinic visit? No   Unplanned office visit, urgent care, ED, or hospital admission in the last 4 weeks? No   How does patient/caregiver feel medication is working? Very good   Financial problems or insurance changes? No   How many doses of your specialty medications were missed in the last 4 weeks? 0   Would patient like to speak to a pharmacist? No   When does the patient need to receive the medication? 06/29/22   Refill Delivery Questions    How will the patient receive the medication? Delivery Ronda   When does the patient need to receive the medication? 06/29/22   Shipping Address Home   Address in [facility] confirmed and updated if neccessary? Yes   Expected Copay ($) 9.85   Is the patient able to afford the medication copay? Yes   Payment Method CC on file   Days supply of Refill 28   Supplies needed? No supplies needed   Refill activity completed? Yes   Refill activity plan Refill scheduled   Shipment/Pickup Date: 06/24/22          Current Outpatient Medications   Medication Sig    acetaminophen (TYLENOL) 325 MG tablet Take 325 mg by mouth every 6 (six) hours as needed for Pain.    albuterol (PROVENTIL) 2.5 mg /3 mL (0.083 %) nebulizer solution USE 1 VIAL IN NEBULIZER EVERY 6 HOURS AS NEEDED FOR WHEEZING. RESCUE (Patient taking differently: Take 2.5 mg by nebulization every 6 (six) hours as needed for Wheezing or  Shortness of Breath. USE 1 VIAL IN NEBULIZER EVERY 6 HOURS AS NEEDED FOR WHEEZING. RESCUE)    allopurinoL (ZYLOPRIM) 100 MG tablet Take 100 mg by mouth once daily.    ascorbic acid, vitamin C, (VITAMIN C) 500 MG tablet Take 500 mg by mouth once daily.    calcium carbonate (OS-TASIA) 500 mg calcium (1,250 mg) tablet Take 1 tablet by mouth 2 (two) times daily.    ciclopirox (PENLAC) 8 % Soln Apply topically nightly.    clobetasol 0.05% (TEMOVATE) 0.05 % Oint Apply topically 2 (two) times daily.    cloNIDine (CATAPRES) 0.1 MG tablet Take 1 tablet (0.1 mg total) by mouth 3 (three) times daily.    diclofenac sodium (VOLTAREN) 1 % Gel Apply 2 g topically once daily. (Patient taking differently: Apply 2 g topically as needed.)    diltiaZEM (CARDIZEM CD) 180 MG 24 hr capsule Take 1 capsule (180 mg total) by mouth once daily.    ferrous sulfate (FEOSOL) 325 mg (65 mg iron) Tab tablet Take 325 mg by mouth once daily.    fish oil-omega-3 fatty acids 300-1,000 mg capsule Take 2 g by mouth every evening.    fluconazole (DIFLUCAN) 150 MG Tab Take 1 and may repeat 8 h  prn (Patient taking differently: Take 1 and may repeat 8 h  prn)    fluticasone (FLONASE) 50 mcg/actuation nasal spray 2 sprays by Each Nare route once daily. (Patient taking differently: 2 sprays by Each Nostril route as needed.)    fluticasone-umeclidin-vilanter (TRELEGY ELLIPTA) 200-62.5-25 mcg inhaler Inhale 1 puff into the lungs once daily.    folic acid-vit B6-vit B12 2.5-25-2 mg (FOLBIC) 2.5-25-2 mg Tab Take 1 tablet by mouth once daily.    furosemide (LASIX) 20 MG tablet Take 1 tablet (20 mg total) by mouth as needed. Take 1 tablet only as needed, for swelling, increase SOB, weight gain of 3 lbs overnight or 5 lbs for the week    levothyroxine (SYNTHROID) 88 MCG tablet Take 1 tablet by mouth once daily (Patient taking differently: Take 88 mcg by mouth before breakfast.)    magnesium oxide (MAG-OX) 400 mg (241.3 mg magnesium) tablet Take 1  tablet by mouth once daily (Patient taking differently: Take 400 mg by mouth once daily.)    meclizine (ANTIVERT) 25 mg tablet Take 1 tablet (25 mg total) by mouth 3 (three) times daily as needed.    mepolizumab 100 mg/mL AtIn Inject 1 mL (100 mg total) into the skin every 28 days.    miconazole nitrate (MONISTAT 3) 200 mg Supp Place 1 suppository (200 mg total) vaginally every evening.    montelukast (SINGULAIR) 10 mg tablet Take 1 tablet (10 mg total) by mouth every evening.    multivitamin (THERAGRAN) tablet Take 1 tablet by mouth once daily.    nitroGLYCERIN (NITROSTAT) 0.4 MG SL tablet Place 1 tablet (0.4 mg total) under the tongue every 5 (five) minutes as needed for Chest pain. As needed (Patient taking differently: Place 0.4 mg under the tongue every 5 (five) minutes as needed for Chest pain. Seek medical help if pain is not relieved after the third dose.)    nystatin (MYCOSTATIN) powder Apply topically 4 (four) times daily.    omeprazole (PRILOSEC) 40 MG capsule Take 1 capsule by mouth once daily (Patient taking differently: Take 40 mg by mouth once daily.)    polyethylene glycol (GLYCOLAX) 17 gram/dose powder Take 17 g by mouth 2 (two) times a day.    rivaroxaban (XARELTO) 10 mg Tab Take 10 mg by mouth daily with dinner or evening meal. HCS    rosuvastatin (CRESTOR) 20 MG tablet Take 1 tablet (20 mg total) by mouth once daily.    sertraline (ZOLOFT) 25 MG tablet Take 1 tablet (25 mg total) by mouth once daily.    vitamin D 1000 units Tab Take 1 tablet (1,000 Units total) by mouth once daily.   Last reviewed on 5/6/2022  3:05 PM by Mary Brower MA    Review of patient's allergies indicates:   Allergen Reactions    Carvedilol Other (See Comments)     Bradycardia and syncope    Boniva [ibandronate]     Codeine     Hydralazine analogues     Iodinated contrast media Hives    Kionex [sodium polystyrene sulfonate] Diarrhea     From encounter 12/11/17 for diarrhea--not true allergy.     Lisinopril     Morphine     Last reviewed on  6/7/2022 3:36 PM by Meg Ray      Tasks added this encounter   No tasks added.   Tasks due within next 3 months   6/22/2022 - Refill Call (Auto Added)     Maximiliano Shultz, PharmD  [facility]  [address]  [facility] [address]  Phone: [phone]  Fax: [phone]

## 2022-07-07 NOTE — PLAN OF CARE
Problem: Fatigue  Goal: Improved Activity Tolerance  Intervention: Promote Improved Energy  Flowsheets (Taken 7/7/2022 1527)  Fatigue Management:   frequent rest breaks encouraged   fatigue-related activity identified  Activity Management: Ambulated -L4

## 2022-07-20 NOTE — CARE UPDATE
07/20/22 1710   Patient Assessment/Suction   Level of Consciousness (AVPU) alert   Respiratory Effort Normal;Unlabored   Expansion/Accessory Muscles/Retractions expansion symmetric   ESPERANZA Breath Sounds clear   LLL Breath Sounds wheezes, expiratory   RUL Breath Sounds wheezes, expiratory   RLL Breath Sounds wheezes, expiratory   Rhythm/Pattern, Respiratory pattern regular;unlabored   Cough Frequency no cough   PRE-TX-O2   O2 Device (Oxygen Therapy) room air   SpO2 100 %   Pulse Oximetry Type Continuous   $ Pulse Oximetry - Multiple Charge Pulse Oximetry - Multiple   Pulse 79   Resp 18   Aerosol Therapy   $ Aerosol Therapy Charges Aerosol Treatment   Daily Review of Necessity (SVN) completed   Respiratory Treatment Status (SVN) given   Treatment Route (SVN) mask;air   Patient Position (SVN) semi-Shetty's   Post Treatment Assessment (SVN) increased aeration   Signs of Intolerance (SVN) none   Breath Sounds Post-Respiratory Treatment   Throughout All Fields Post-Treatment All Fields   Throughout All Fields Post-Treatment aeration increased   Post-treatment Heart Rate (beats/min) 79   Post-treatment Resp Rate (breaths/min) 20   Education   $ Education Bronchodilator;15 min   Respiratory Evaluation   $ Care Plan Tech Time 15 min

## 2022-07-20 NOTE — ED PROVIDER NOTES
Encounter Date: 7/20/2022       History     Chief Complaint   Patient presents with    Bloated     Patient reports bloating, belching, and gas x a few days      89-year-old female brought to emergency room by her daughters from her residence who has a history of anemia, CHF, COPD, depression, type 2 diabetes, GERD, PE, pneumonia, among others who has been complaining of abdominal bloating the last couple of days.  The patient admits that she has had some increased belching.  No significant weight changes in fact may have lost some weight according to the daughters.  Her last stool was 2 days ago.  She had an appointment to see her physician today but missed the appointment because she felt too weak to ambulate.  No history of nausea vomiting.  No fever.  She does complain of some exertional dyspnea but has had no coughing, chills or sputum production and wheezing.  The patient had seen Dr. Yoko hilario recently and told her that some of her bowel problems may be related to her over zealous use of iron supplementation.  She was advised to discontinue iron tablets and was started on Gas-X lactulose and MiraLax.  Currently she denies any difficulty urinating.  She has mention has had some history of being anemic.        Review of patient's allergies indicates:   Allergen Reactions    Carvedilol Other (See Comments)     Bradycardia and syncope    Boniva [ibandronate]     Codeine     Hydralazine analogues     Iodinated contrast media Hives    Kionex [sodium polystyrene sulfonate] Diarrhea     From encounter 12/11/17 for diarrhea--not true allergy.    Lisinopril     Morphine      Past Medical History:   Diagnosis Date    Anemia due to multiple mechanisms 6/29/2018    Anemia, chronic disease 6/29/2018    Anemia, chronic renal failure, stage 3 (moderate) 6/29/2018    Anticoagulant long-term use     aspirin    Aortic aneurysm     Chest pain     CHF (congestive heart failure)     COPD (chronic obstructive  pulmonary disease)     COPD with acute exacerbation 1/9/2015    Depression     Diabetes mellitus type II     no longer on any DM meds    DVT (deep venous thrombosis) 06/09/2018    Encounter for blood transfusion     GERD (gastroesophageal reflux disease)     GI bleed     Gout     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 8/7/2018    Hip arthritis 3/1/2016    Homocysteinemia 8/7/2018    Hyperlipidemia     Hypertension     Incontinent of urine     Normocytic normochromic anemia 6/29/2018    Oxygen dependent     use oxygen as needed    PE (pulmonary thromboembolism) 06/09/2018    Pneumonia of right lower lobe due to infectious organism 9/11/2017    Syncope     Thyroid disease     hypothyroid    Type 2 diabetes mellitus with stage 3 chronic kidney disease 1/18/2013    UTI (urinary tract infection)      Past Surgical History:   Procedure Laterality Date    APPENDECTOMY      CHOLECYSTECTOMY      COLONOSCOPY Left 10/29/2020    Procedure: COLONOSCOPY;  Surgeon: Singh Kee III, MD;  Location: [facility];  Service: Endoscopy;  Laterality: Left;    ESOPHAGOGASTRODUODENOSCOPY Left 10/26/2020    Procedure: EGD (ESOPHAGOGASTRODUODENOSCOPY);  Surgeon: Kareem Gramajo MD;  Location: [facility];  Service: Endoscopy;  Laterality: Left;    ESOPHAGOGASTRODUODENOSCOPY Left 10/28/2020    Procedure: EGD (ESOPHAGOGASTRODUODENOSCOPY);  Surgeon: Kareem Gramajo MD;  Location: [facility];  Service: Endoscopy;  Laterality: Left;    ESOPHAGOGASTRODUODENOSCOPY N/A 9/16/2021    Procedure: EGD (ESOPHAGOGASTRODUODENOSCOPY);  Surgeon: Kareem Gramajo MD;  Location: [facility];  Service: Endoscopy;  Laterality: N/A;    FRACTURE SURGERY      right hip     HERNIA REPAIR      groin    HYSTERECTOMY      INSERTION OF IMPLANTABLE LOOP RECORDER N/A 12/12/2018    Procedure: Insertion, Implantable Loop Recorder;  Surgeon: Ashok Herbert MD;  Location: [facility] CATH LAB;  Service: Cardiology;  Laterality: N/A;    PERCUTANEOUS PINNING  OF HIP Left 3/23/2019    Procedure: PINNING, HIP, PERCUTANEOUS;  Surgeon: Jorje Hamlin MD;  Location[facility] OR;  Service: Orthopedics;  Laterality: Left;    SMALL BOWEL ENTEROSCOPY N/A 10/29/2020    Procedure: ENTEROSCOPY;  Surgeon: Singh Kee III, MD;  Location: [facility] ENDO;  Service: Endoscopy;  Laterality: N/A;    VARICOSE VEIN SURGERY       Family History   Problem Relation Age of Onset    Hypertension Mother     Heart disease Mother     Lung disease Father     Diabetes Sister     Diabetes Brother     Kidney disease Son     Breast cancer Neg Hx     Ovarian cancer Neg Hx      Social History     Tobacco Use    Smoking status: Former Smoker     Packs/day: 0.35     Years: 44.00     Pack years: 15.40     Types: Cigarettes     Start date: 1970    Smokeless tobacco: Never Used    Tobacco comment: 1/08/2015   Substance Use Topics    Alcohol use: No     Alcohol/week: 0.0 standard drinks    Drug use: No     Review of Systems   Constitutional: Positive for appetite change. Negative for chills, diaphoresis, fatigue and fever.   HENT: Negative for sinus pressure, sinus pain, sneezing, sore throat and tinnitus.    Respiratory: Negative for shortness of breath.    Cardiovascular: Negative for chest pain.   Gastrointestinal: Positive for abdominal distention, abdominal pain and constipation. Negative for nausea and vomiting.   Genitourinary: Negative for difficulty urinating and dysuria.   Musculoskeletal: Negative for back pain.   Skin: Negative.  Negative for rash.   Neurological: Positive for weakness.   Hematological: Does not bruise/bleed easily.   All other systems reviewed and are negative.      Physical Exam     Initial Vitals [07/20/22 1302]   BP Pulse Resp Temp SpO2   (!) 186/83 78 18 98.2 °F (36.8 °C) 96 %      MAP       --         Physical Exam    Vitals reviewed.  Constitutional: She appears well-developed and well-nourished. She is not diaphoretic. No distress.   HENT:   Head:  Normocephalic and atraumatic.   Nose: Nose normal.   Mouth/Throat: Oropharynx is clear and moist. No oropharyngeal exudate.   Eyes: Conjunctivae are normal. Pupils are equal, round, and reactive to light. Right eye exhibits no discharge. Left eye exhibits no discharge. No scleral icterus.   Neck: Neck supple. No thyromegaly present. No tracheal deviation present. No JVD present.   Normal range of motion.  Cardiovascular: Normal rate, regular rhythm, normal heart sounds and intact distal pulses. Exam reveals no gallop and no friction rub.    No murmur heard.  Pulmonary/Chest: Breath sounds normal. No stridor. No respiratory distress. She has no wheezes. She has no rhonchi. She has no rales. She exhibits no tenderness.   Abdominal: Abdomen is soft. Bowel sounds are normal. She exhibits no distension. There is no abdominal tenderness. There is no rebound and no guarding.   Musculoskeletal:         General: No tenderness or edema. Normal range of motion.      Cervical back: Normal range of motion and neck supple.     Lymphadenopathy:     She has no cervical adenopathy.   Neurological: She is alert and oriented to person, place, and time. She has normal strength. GCS score is 15. GCS eye subscore is 4. GCS verbal subscore is 5. GCS motor subscore is 6.   Skin: Skin is warm and dry. Capillary refill takes less than 2 seconds. No rash noted. No erythema. No pallor.         ED Course   Procedures  Labs Reviewed   CBC W/ AUTO DIFFERENTIAL - Abnormal; Notable for the following components:       Result Value    RBC 3.26 (*)     Hemoglobin 10.4 (*)     Hematocrit 32.4 (*)     MCV 99 (*)     MCH 31.9 (*)     All other components within normal limits   COMPREHENSIVE METABOLIC PANEL - Abnormal; Notable for the following components:    Potassium 5.5 (*)     CO2 22 (*)     BUN 49 (*)     Creatinine 2.3 (*)     Albumin 3.2 (*)     eGFR if  20.8 (*)     eGFR if non  18.0 (*)     All other components  within normal limits   TSH - Abnormal; Notable for the following components:    TSH 0.200 (*)     All other components within normal limits   MAGNESIUM   T4, FREE   URINALYSIS, REFLEX TO URINE CULTURE   SARS-COV-2 RNA AMPLIFICATION, QUAL   SARS-COV-2 RNA AMPLIFICATION, QUAL   POCT GLUCOSE MONITORING CONTINUOUS          Imaging Results          X-Ray Chest AP Portable (In process)                CT Abdomen Pelvis  Without Contrast (Final result)  Result time 07/20/22 15:59:05   Procedure changed from CT Abdomen Pelvis With Contrast     Final result by Jonna Lyn MD (07/20/22 15:59:05)                 Narrative:    CMS MANDATED QUALITY DATA - CT RADIATION - 436    All CT scans at this facility utilize dose modulation, iterative reconstruction, and/or weight based dosing when appropriate to reduce radiation dose to as low as reasonably achievable.    HISTORY: Abdominal bloating    COMPARISON: 3/26/2022    FINDINGS: Noncontrast axial images were obtained. Nonenhanced study is tailored for the detection of urolithiasis, and is insensitive for abnormalities of the solid organs, vasculature and hollow viscera.    CT ABDOMEN: The heart is enlarged. There is calcification of the mitral annulus.    There are mild emphysematous changes in the lung bases mild bronchiectasis. There is improvement of the atelectasis bilaterally with persistent atelectasis or scarring in the right lung base.    There is a small hiatal hernia.    The liver, spleen and pancreas have a normal noncontrast appearance. There is no biliary duct dilatation. The gallbladder is absent.    The adrenal glands are normal.    There are no thick-walled or dilated bowel loops. There is a moderate amount fecal material in the right colon.    There is no free fluid. There is no mesenteric or retroperitoneal adenopathy. There is a stable 4.8 x 4.7 cm infrarenal abdominal aortic aneurysm. There is diffuse vascular calcification.    The musculature is  normal.    There is multilevel degenerative changes of the lumbar spine.    CT PELVIS: There is sigmoid diverticulosis. The bladder is distended. The uterus is absent.    There is a total right hip arthroplasty. There are three threaded screws within the left hip.    IMPRESSION: Moderate amount of fecal material within the right colon compatible with constipation. There is no evidence of obstruction    Stable 4.8 cm infrarenal abdominal aortic aneurysm with diffuse vascular calcification    Prior cholecystectomy and hysterectomy.        Small hiatal hernia    Stable atelectasis in the right lung base    Electronically signed by:  Jonna Lyn MD  7/20/2022 3:59 PM CDT Workstation: [id number]                               Medications   albuterol nebulizer solution 2.5 mg (has no administration in time range)   dextrose 50 % in water (D50W) injection 25 g (has no administration in time range)   insulin regular injection 8 Units 0.08 mL (has no administration in time range)                Attending Attestation:             Attending ED Notes:   This 89-year-old female brought to emergency room by family from her residence for bloating, had a CT scan of the abdomen and pelvis showing moderate amount of stool in the right colon consistent with constipation.  Patient was also noted to have a 4.8 centimeter infrarenal aortic aneurysm which is reportedly stable.  Labs showed a slight worsening of her renal function with a BUN 49 and creatinine 2.3 and a GFR 20.8.  The potassium is elevated at 5.5.  The H&H is 10.4 and 32.4.  The patient will be treated for hyperkalemia with albuterol of glucose and insulin.  EKG and chest x-ray pending.  COVID screen will also be ordered.  Hospital Medicine is consulted for a telemetry admission.               Clinical Impression:   Final diagnoses:  [E87.5] Hyperkalemia  [N18.9] Chronic kidney disease, unspecified CKD stage (Primary)  [D64.9] Anemia, unspecified type  [I71.4]  Abdominal aortic aneurysm (AAA) without rupture  [K59.00] Constipation, unspecified constipation type  [I10] Hypertension, unspecified type          ED Disposition Condition    Observation               Navin Lemon Jr., MD  07/20/22 0530

## 2022-07-20 NOTE — H&P
Count includes the [facility] Medicine History & Physical Examination   Patient Name: Blaire Cage  MRN: [id number]  Patient Class: OP- Observation   Admission Date: 7/20/2022 12:59 PM  Length of Stay: 0  Attending Physician: Dr. Jackson  Primary Care Provider: Eli Edmonds MD  Face-to-Face encounter date: 07/20/2022  Code Status: full code  Chief Complaint: Bloated (Patient reports bloating, belching, and gas x a few days )          Patient information was obtained from patient, past medical records and ER records.   HISTORY OF PRESENT ILLNESS:   History was obtained from the patient and collateral obtained from the family present at the bedside and ER physician Sign-out. Patient is a 89-year-old female with history of anemia, CHF, COPD, depression, type 2 diabetes, GERD, PE, pneumonia with the complaint of  abdominal bloating the last couple of days.  The patient admits that she has had some increased belching.  Her last stool was 2 days ago.  She had an appointment to see her physician today but missed the appointment because she felt too weak to ambulate. The patient had seen Dr. Gramajo recently and was told that some of her bowel problems may be related to the use of iron supplementation.  She was advised to discontinue iron tablets and was started on Gas-X lactulose and MiraLax.  Currently she denies any difficulty urinating. Daughter reports the patient has good appetite but she has to be encouraged to drink water.       Patient denies fever, chills, chest pain increased sob, nausea, vomiting, abdominal pain, dysuria, hematuria, diarrhea, melena , numbness, tingling or LOC.    In the ED patient is afebrile. She is hypertensive. CBC with chronic anemia. H/H appears to be at patient's baseline 10/32. CMP with potassium 5.5, bun 49, creat 2.3. CT abdomen shows constipation. Stable 4.8cm infrarenal AAA with diffuse vascular calcification. The patient is treated with meds to shift Potassium in the ED.        REVIEW OF SYSTEMS:   10 Point Review of System was performed and was found to be negative except for that mentioned already in the HPI above.     PAST MEDICAL HISTORY:     Past Medical History:   Diagnosis Date    Anemia due to multiple mechanisms 6/29/2018    Anemia, chronic disease 6/29/2018    Anemia, chronic renal failure, stage 3 (moderate) 6/29/2018    Anticoagulant long-term use     aspirin    Aortic aneurysm     Chest pain     CHF (congestive heart failure)     COPD (chronic obstructive pulmonary disease)     COPD with acute exacerbation 1/9/2015    Depression     Diabetes mellitus type II     no longer on any DM meds    DVT (deep venous thrombosis) 06/09/2018    Encounter for blood transfusion     GERD (gastroesophageal reflux disease)     GI bleed     Gout     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 8/7/2018    Hip arthritis 3/1/2016    Homocysteinemia 8/7/2018    Hyperlipidemia     Hypertension     Incontinent of urine     Normocytic normochromic anemia 6/29/2018    Oxygen dependent     use oxygen as needed    PE (pulmonary thromboembolism) 06/09/2018    Pneumonia of right lower lobe due to infectious organism 9/11/2017    Syncope     Thyroid disease     hypothyroid    Type 2 diabetes mellitus with stage 3 chronic kidney disease 1/18/2013    UTI (urinary tract infection)        PAST SURGICAL HISTORY:     Past Surgical History:   Procedure Laterality Date    APPENDECTOMY      CHOLECYSTECTOMY      COLONOSCOPY Left 10/29/2020    Procedure: COLONOSCOPY;  Surgeon: Singh Kee III, MD;  Location: [facility];  Service: Endoscopy;  Laterality: Left;    ESOPHAGOGASTRODUODENOSCOPY Left 10/26/2020    Procedure: EGD (ESOPHAGOGASTRODUODENOSCOPY);  Surgeon: Kareem Gramajo MD;  Location: [facility];  Service: Endoscopy;  Laterality: Left;    ESOPHAGOGASTRODUODENOSCOPY Left 10/28/2020    Procedure: EGD (ESOPHAGOGASTRODUODENOSCOPY);  Surgeon: Kareem Gramajo MD;  Location: [facility]  ENDO;  Service: Endoscopy;  Laterality: Left;    ESOPHAGOGASTRODUODENOSCOPY N/A 9/16/2021    Procedure: EGD (ESOPHAGOGASTRODUODENOSCOPY);  Surgeon: Kareem Gramajo MD;  Location: [facility] ENDO;  Service: Endoscopy;  Laterality: N/A;    FRACTURE SURGERY      right hip     HERNIA REPAIR      groin    HYSTERECTOMY      INSERTION OF IMPLANTABLE LOOP RECORDER N/A 12/12/2018    Procedure: Insertion, Implantable Loop Recorder;  Surgeon: Ashok Herbert MD;  Location: [facility] CATH LAB;  Service: Cardiology;  Laterality: N/A;    PERCUTANEOUS PINNING OF HIP Left 3/23/2019    Procedure: PINNING, HIP, PERCUTANEOUS;  Surgeon: Jorje Hamlin MD;  Location: [facility] OR;  Service: Orthopedics;  Laterality: Left;    SMALL BOWEL ENTEROSCOPY N/A 10/29/2020    Procedure: ENTEROSCOPY;  Surgeon: Singh Kee III, MD;  Location: [facility] ENDO;  Service: Endoscopy;  Laterality: N/A;    VARICOSE VEIN SURGERY         ALLERGIES:   Carvedilol, Boniva [ibandronate], Codeine, Hydralazine analogues, Iodinated contrast media, Kionex [sodium polystyrene sulfonate], Lisinopril, and Morphine    FAMILY HISTORY:     Family History   Problem Relation Age of Onset    Hypertension Mother     Heart disease Mother     Lung disease Father     Diabetes Sister     Diabetes Brother     Kidney disease Son     Breast cancer Neg Hx     Ovarian cancer Neg Hx        SOCIAL HISTORY:     Social History     Tobacco Use    Smoking status: Former Smoker     Packs/day: 0.35     Years: 44.00     Pack years: 15.40     Types: Cigarettes     Start date: 1970    Smokeless tobacco: Never Used    Tobacco comment: 1/08/2015   Substance Use Topics    Alcohol use: No     Alcohol/week: 0.0 standard drinks        Social History     Substance and Sexual Activity   Sexual Activity Not Currently        HOME MEDICATIONS:     Prior to Admission medications    Medication Sig Start Date End Date Taking? Authorizing Provider   allopurinoL (ZYLOPRIM) 100 MG tablet Take 100  mg by mouth once daily. 8/21/20  Yes Historical Provider   ascorbic acid, vitamin C, (VITAMIN C) 500 MG tablet Take 500 mg by mouth once daily.   Yes Historical Provider   calcium carbonate (OS-TASIA) 500 mg calcium (1,250 mg) tablet Take 1 tablet by mouth 2 (two) times daily.   Yes Historical Provider   diltiaZEM (CARDIZEM CD) 180 MG 24 hr capsule Take 1 capsule (180 mg total) by mouth once daily. 3/24/22  Yes Malik Fu MD   ferrous sulfate (FEOSOL) 325 mg (65 mg iron) Tab tablet Take 325 mg by mouth once daily.   Yes Historical Provider   fish oil-omega-3 fatty acids 300-1,000 mg capsule Take 2 g by mouth every evening.   Yes Historical Provider   fluticasone-umeclidin-vilanter (TRELEGY ELLIPTA) 200-62.5-25 mcg inhaler Inhale 1 puff into the lungs once daily. 2/2/22  Yes Sadie Loredo MD   folic acid-vit B6-vit B12 2.5-25-2 mg (FOLBIC) 2.5-25-2 mg Tab Take 1 tablet by mouth once daily.   Yes Historical Provider   levothyroxine (SYNTHROID) 88 MCG tablet Take 1 tablet by mouth once daily  Patient taking differently: Take 88 mcg by mouth before breakfast. 2/26/22  Yes Eli Edmonds MD   montelukast (SINGULAIR) 10 mg tablet TAKE 1 TABLET BY MOUTH ONCE DAILY IN THE EVENING  Patient taking differently: Take 10 mg by mouth every evening. 6/30/22  Yes Eli Edmonds MD   multivitamin (THERAGRAN) tablet Take 1 tablet by mouth once daily. 10/5/18  Yes JUAN C Qureshi   omeprazole (PRILOSEC) 40 MG capsule Take 1 capsule by mouth once daily  Patient taking differently: Take 40 mg by mouth once daily. 9/7/21  Yes Eli Edmonds MD   rosuvastatin (CRESTOR) 20 MG tablet Take 1 tablet (20 mg total) by mouth once daily. 11/8/21 11/8/22 Yes Sadie Perez NP   sertraline (ZOLOFT) 25 MG tablet Take 1 tablet by mouth once daily  Patient taking differently: Take 25 mg by mouth once daily. 6/30/22  Yes Eli Edmonds MD   acetaminophen (TYLENOL) 325 MG tablet Take 325 mg by mouth every 6 (six) hours as needed for  Pain.    Historical Provider   albuterol (PROVENTIL) 2.5 mg /3 mL (0.083 %) nebulizer solution USE 1 VIAL IN NEBULIZER EVERY 6 HOURS AS NEEDED FOR WHEEZING. RESCUE  Patient taking differently: Take 2.5 mg by nebulization every 6 (six) hours as needed for Wheezing or Shortness of Breath. USE 1 VIAL IN NEBULIZER EVERY 6 HOURS AS NEEDED FOR WHEEZING. RESCUE 2/2/22   Sadie Loredo MD   ciclopirox (PENLAC) 8 % Soln Apply topically nightly. 4/14/22 7/13/22  Winnie Banks NP   cloNIDine (CATAPRES) 0.1 MG tablet Take 1 tablet (0.1 mg total) by mouth 3 (three) times daily. 3/27/22 5/6/22  Malik Fu MD   mepolizumab 100 mg/mL AtIn Inject 1 mL (100 mg total) into the skin every 28 days. 5/4/22   Sadie Loredo MD   simethicone 180 mg Cap Take 1 capsule by mouth 3 (three) times daily as needed. 7/12/22   Historical Provider   ciprofloxacin HCl (CIPRO) 500 MG tablet Take 500 mg by mouth once daily. 4/25/22 7/20/22  Historical Provider   clobetasol 0.05% (TEMOVATE) 0.05 % Oint Apply topically 2 (two) times daily. 4/22/22 7/20/22  Consuelo Tam MD   diclofenac sodium (VOLTAREN) 1 % Gel Apply 2 g topically once daily.  Patient taking differently: Apply 2 g topically as needed. 5/24/20 7/20/22  Eli Edmonds MD   fluconazole (DIFLUCAN) 150 MG Tab Take 1 and may repeat 8 h  prn  Patient taking differently: Take 1 and may repeat 8 h  prn 4/11/22 7/20/22  Eli Edmonds MD   fluticasone (FLONASE) 50 mcg/actuation nasal spray 2 sprays by Each Nare route once daily.  Patient taking differently: 2 sprays by Each Nostril route as needed. 12/20/17 7/20/22  Jeffrey Christopher MD   furosemide (LASIX) 20 MG tablet Take 1 tablet (20 mg total) by mouth as needed. Take 1 tablet only as needed, for swelling, increase SOB, weight gain of 3 lbs overnight or 5 lbs for the week 3/24/22 7/20/22  Malik Fu MD   ketoconazole (NIZORAL) 2 % shampoo Apply topically twice a week.  Patient taking differently: Apply 1 application  "topically twice a week. 5/20/21 3/26/22  Candace Valles MD   magnesium oxide (MAG-OX) 400 mg (241.3 mg magnesium) tablet Take 1 tablet by mouth once daily  Patient taking differently: Take 400 mg by mouth once daily. 1/9/21 7/20/22  Eli Edmonds MD   meclizine (ANTIVERT) 25 mg tablet Take 1 tablet (25 mg total) by mouth 3 (three) times daily as needed. 11/9/20 7/20/22  Eli Edmonds MD   miconazole nitrate (MONISTAT 3) 200 mg Supp Place 1 suppository (200 mg total) vaginally every evening. 4/11/22 7/20/22  Eli Edmonds MD   nitroGLYCERIN (NITROSTAT) 0.4 MG SL tablet Place 1 tablet (0.4 mg total) under the tongue every 5 (five) minutes as needed for Chest pain. As needed  Patient taking differently: Place 0.4 mg under the tongue every 5 (five) minutes as needed for Chest pain. Seek medical help if pain is not relieved after the third dose. 2/27/18 7/20/22  Alfredito Trevino MD   nystatin (MYCOSTATIN) powder Apply topically 4 (four) times daily. 4/8/22 7/20/22  Anne Arcos NP   polyethylene glycol (GLYCOLAX) 17 gram/dose powder Take 17 g by mouth 2 (two) times a day.  7/20/22  Historical Provider   rivaroxaban (XARELTO) 10 mg Tab Take 10 mg by mouth daily with dinner or evening meal. HCS  7/20/22  Historical Provider   vitamin D 1000 units Tab Take 1 tablet (1,000 Units total) by mouth once daily. 8/26/16 7/20/22  Luis Manuel Mauricio MD         PHYSICAL EXAM:   BP (!) 207/78   Pulse 80   Temp 98.2 °F (36.8 °C) (Oral)   Resp 18   Ht 5' 3" (1.6 m)   Wt 55.3 kg (122 lb)   LMP  (LMP Unknown)   SpO2 98%   BMI 21.61 kg/m²   Vitals Reviewed  General appearance: Well-developed, well-nourished, elderly  female   Skin: No Rash. Warm, dry.   Neuro: AAOx3. Motor and sensory exams grossly intact. Good tone. Power in all 4 extremities 5/5.   HENT: Atraumatic head. Moist mucous membranes of oral cavity.  Eyes: Normal extraocular movements.   Neck: Supple. No evidence of lymphadenopathy. No " thyroidomegaly.  Lungs: Clear to auscultation bilaterally. No wheezing present.   Heart: Regular rate and rhythm. S1 and S2 present with no murmurs/gallop/rub. No pedal edema. No JVD present.   Abdomen: Soft, mildly distended, non-tender. No rebound tenderness/guarding. No masses or organomegaly. Bowel sounds are normal. Bladder is not palpable.   Extremities: No cyanosis, clubbing.  Psych/mental status: Alert and oriented. Cooperative. Responds appropriately to questions.   EMERGENCY DEPARTMENT LABS AND IMAGING:     Labs Reviewed   CBC W/ AUTO DIFFERENTIAL - Abnormal; Notable for the following components:       Result Value    RBC 3.26 (*)     Hemoglobin 10.4 (*)     Hematocrit 32.4 (*)     MCV 99 (*)     MCH 31.9 (*)     All other components within normal limits   COMPREHENSIVE METABOLIC PANEL - Abnormal; Notable for the following components:    Potassium 5.5 (*)     CO2 22 (*)     BUN 49 (*)     Creatinine 2.3 (*)     Albumin 3.2 (*)     eGFR if  20.8 (*)     eGFR if non  18.0 (*)     All other components within normal limits   TSH - Abnormal; Notable for the following components:    TSH 0.200 (*)     All other components within normal limits   MAGNESIUM   T4, FREE   SARS-COV-2 RNA AMPLIFICATION, QUAL   URINALYSIS, REFLEX TO URINE CULTURE   POCT GLUCOSE   POCT GLUCOSE MONITORING CONTINUOUS       X-Ray Chest AP Portable    Result Date: 7/20/2022  XR CHEST 1 VIEW CLINICAL HISTORY: 89 years Female Abdominal bloating, chronic kidney disease COMPARISON: March 26, 2022 FINDINGS: Cardiac silhouette size is stable compared to prior. Atherosclerotic calcification of the aorta. Loop recorder projects over the left chest. Mild scarring involving the right lung base. No confluent airspace disease. Bilateral diaphragmatic hernias. No large pleural effusion or pneumothorax. IMPRESSION: Scarring involving the right lung base. No acute pulmonary pathology. Electronically signed by:  Harjeet Mantilla  MD  7/20/2022 5:17 PM CDT Workstation: [id number]    CT Abdomen Pelvis  Without Contrast    Result Date: 7/20/2022  CMS MANDATED QUALITY DATA - CT RADIATION - 436 All CT scans at this facility utilize dose modulation, iterative reconstruction, and/or weight based dosing when appropriate to reduce radiation dose to as low as reasonably achievable. HISTORY: Abdominal bloating COMPARISON: 3/26/2022 FINDINGS: Noncontrast axial images were obtained. Nonenhanced study is tailored for the detection of urolithiasis, and is insensitive for abnormalities of the solid organs, vasculature and hollow viscera. CT ABDOMEN: The heart is enlarged. There is calcification of the mitral annulus. There are mild emphysematous changes in the lung bases mild bronchiectasis. There is improvement of the atelectasis bilaterally with persistent atelectasis or scarring in the right lung base. There is a small hiatal hernia. The liver, spleen and pancreas have a normal noncontrast appearance. There is no biliary duct dilatation. The gallbladder is absent. The adrenal glands are normal. There are no thick-walled or dilated bowel loops. There is a moderate amount fecal material in the right colon. There is no free fluid. There is no mesenteric or retroperitoneal adenopathy. There is a stable 4.8 x 4.7 cm infrarenal abdominal aortic aneurysm. There is diffuse vascular calcification. The musculature is normal. There is multilevel degenerative changes of the lumbar spine. CT PELVIS: There is sigmoid diverticulosis. The bladder is distended. The uterus is absent. There is a total right hip arthroplasty. There are three threaded screws within the left hip. IMPRESSION: Moderate amount of fecal material within the right colon compatible with constipation. There is no evidence of obstruction Stable 4.8 cm infrarenal abdominal aortic aneurysm with diffuse vascular calcification Prior cholecystectomy and hysterectomy. Small hiatal hernia Stable atelectasis  in the right lung base Electronically signed by:  Jonna Lyn MD  7/20/2022 3:59 PM CDT Workstation: [id number]    ASSESSMENT & PLAN:   Blaire Cage is a 89 y.o. female admitted for    Active Hospital Problems    Diagnosis  POA    *Acute kidney injury superimposed on chronic kidney disease 3 [N17.9, N18.9]  Unknown    Chronic diastolic heart failure [I50.32]  Yes    Hyperkalemia [E87.5]  Unknown    Constipation [K59.00]  Yes    Controlled type 2 diabetes mellitus, without long-term current use of insulin [E11.9]  Yes                Plan  Admit to Med-tele  NS 500cc bolus now; encourage PO fluids; monitor for fluid overload  Kayexalate now  Senna-docusate bid  Continue home medications as ordered  Hold diuretics  Renally dose medications  Avoid nephrotoxic meds  Follow bun/creat  accuchecks with low dose ssi; hypoglycemia protocol  Continue home medications as ordered for chronic conditions        Diet: Diabetic    DVT Prophylaxis: mechanical DVT prophylaxis. Encourage ambulation and OOB as tolerated.     Discharge Planning and Disposition:  Patient will be discharged in 1-2 days   ________________________________________________________________________________    Face-to-Face encounter date: 07/20/2022  Encounter included review of the medical records, interviewing and examining the patient face-to-face, discussion with family and other health care providers including emergency medicine physician, admission orders, interpreting lab/test results and formulating a plan of care.   Medical Decision Making during this encounter was  [_] Low Complexity  [_] Moderate Complexity  [x] High Complexity  _________________________________________________________________________________    INPATIENT LIST OF MEDICATIONS     Current Facility-Administered Medications:     sodium polystyrene 15 gram/60 mL suspension 15 g, 15 g, Oral, Once, Mirta Viera NP    Current Outpatient Medications:     allopurinoL  (ZYLOPRIM) 100 MG tablet, Take 100 mg by mouth once daily., Disp: , Rfl:     ascorbic acid, vitamin C, (VITAMIN C) 500 MG tablet, Take 500 mg by mouth once daily., Disp: , Rfl:     calcium carbonate (OS-TASIA) 500 mg calcium (1,250 mg) tablet, Take 1 tablet by mouth 2 (two) times daily., Disp: , Rfl:     diltiaZEM (CARDIZEM CD) 180 MG 24 hr capsule, Take 1 capsule (180 mg total) by mouth once daily., Disp: 90 capsule, Rfl: 3    ferrous sulfate (FEOSOL) 325 mg (65 mg iron) Tab tablet, Take 325 mg by mouth once daily., Disp: , Rfl:     fish oil-omega-3 fatty acids 300-1,000 mg capsule, Take 2 g by mouth every evening., Disp: , Rfl:     fluticasone-umeclidin-vilanter (TRELEGY ELLIPTA) 200-62.5-25 mcg inhaler, Inhale 1 puff into the lungs once daily., Disp: 60 each, Rfl: 11    folic acid-vit B6-vit B12 2.5-25-2 mg (FOLBIC) 2.5-25-2 mg Tab, Take 1 tablet by mouth once daily., Disp: , Rfl:     levothyroxine (SYNTHROID) 88 MCG tablet, Take 1 tablet by mouth once daily (Patient taking differently: Take 88 mcg by mouth before breakfast.), Disp: 90 tablet, Rfl: 3    montelukast (SINGULAIR) 10 mg tablet, TAKE 1 TABLET BY MOUTH ONCE DAILY IN THE EVENING (Patient taking differently: Take 10 mg by mouth every evening.), Disp: 90 tablet, Rfl: 3    multivitamin (THERAGRAN) tablet, Take 1 tablet by mouth once daily., Disp: , Rfl:     omeprazole (PRILOSEC) 40 MG capsule, Take 1 capsule by mouth once daily (Patient taking differently: Take 40 mg by mouth once daily.), Disp: 90 capsule, Rfl: 3    rosuvastatin (CRESTOR) 20 MG tablet, Take 1 tablet (20 mg total) by mouth once daily., Disp: 90 tablet, Rfl: 3    sertraline (ZOLOFT) 25 MG tablet, Take 1 tablet by mouth once daily (Patient taking differently: Take 25 mg by mouth once daily.), Disp: 90 tablet, Rfl: 3    acetaminophen (TYLENOL) 325 MG tablet, Take 325 mg by mouth every 6 (six) hours as needed for Pain., Disp: , Rfl:     albuterol (PROVENTIL) 2.5 mg /3 mL (0.083 %)  nebulizer solution, USE 1 VIAL IN NEBULIZER EVERY 6 HOURS AS NEEDED FOR WHEEZING. RESCUE (Patient taking differently: Take 2.5 mg by nebulization every 6 (six) hours as needed for Wheezing or Shortness of Breath. USE 1 VIAL IN NEBULIZER EVERY 6 HOURS AS NEEDED FOR WHEEZING. RESCUE), Disp: 300 mL, Rfl: 3    ciclopirox (PENLAC) 8 % Soln, Apply topically nightly., Disp: 6.6 mL, Rfl: 3    cloNIDine (CATAPRES) 0.1 MG tablet, Take 1 tablet (0.1 mg total) by mouth 3 (three) times daily., Disp: 90 tablet, Rfl: 0    mepolizumab 100 mg/mL AtIn, Inject 1 mL (100 mg total) into the skin every 28 days., Disp: 1 mL, Rfl: 12    simethicone 180 mg Cap, Take 1 capsule by mouth 3 (three) times daily as needed., Disp: , Rfl:       Scheduled Meds:   sodium polystyrene  15 g Oral Once     Continuous Infusions:  PRN Meds:.      Mirta Viera  [facility] Hospitalist  07/20/2022

## 2022-07-21 NOTE — PT/OT/SLP EVAL
Occupational Therapy   Evaluation    Name: Blaire Cage  MRN: [id number]  Admitting Diagnosis:  Acute kidney injury superimposed on chronic kidney disease  Recent Surgery: * No surgery found *      Recommendations:     Discharge Recommendations: home health OT  Discharge Equipment Recommendations:  none  Barriers to discharge:  None    Assessment:     Blaire Cage is a 89 y.o. female with a medical diagnosis of Acute kidney injury superimposed on chronic kidney disease.  Pt agreeable to OT evaluation this AM. Performance deficits affecting function: weakness, impaired endurance, impaired self care skills, impaired functional mobility, gait instability, impaired balance, decreased safety awareness, impaired cardiopulmonary response to activity.      Rehab Prognosis: Good; patient would benefit from acute skilled OT services to address these deficits and reach maximum level of function.       Plan:     Patient to be seen 3 x/week to address the above listed problems via self-care/home management, therapeutic activities, therapeutic exercises  · Plan of Care Expires:    · Plan of Care Reviewed with: patient    Subjective     Chief Complaint: fatigue  Patient/Family Comments/goals: to return home    Occupational Profile:  Living Environment: Pt lives with daughter in a 1 story home with no steps to enter. Pt has a tub/shower combo with a TTB and standard height toilets  Previous level of function: Assist from daughter with dressing and bathing; Mod I with feeding and grooming tasks; daughter cook, cleans, and provides transportation.  Roles and Routines: mother  Equipment Used at Home:  bedside commode, bath bench, oxygen, walker, rolling  Assistance upon Discharge: yes, from daughter    Pain/Comfort:  · Pain Rating 1: 0/10    Patients cultural, spiritual, [employer]ism conflicts given the current situation:      Objective:     Communicated with: nursing prior to session.  Patient found HOB elevated with oxygen,  telemetry, bed alarm upon OT entry to room.    General Precautions: Standard, fall   Orthopedic Precautions:N/A   Braces: N/A  Respiratory Status: Nasal cannula, flow 4 L/min    Occupational Performance:    Bed Mobility:    · Patient completed Supine to Sit with contact guard assistance  · Patient completed Sit to Supine with stand by assistance    Activities of Daily Living:  · Feeding:  independence per pt  · Grooming: stand by assistance seated EOB   · Lower Body Dressing: stand by assistance seated EOB to doff sock; pt required maximum assist to don sock seated EOB    Cognitive/Visual Perceptual:  Cognitive/Psychosocial Skills:     -       Oriented to: Person, Place, Time and Situation   -       Follows Commands/attention:Follows one-step commands  -       Communication: clear/fluent  -       Memory: No Deficits noted  -       Safety awareness/insight to disability: intact   -       Mood/Affect/Coping skills/emotional control: Appropriate to situation, Cooperative and Pleasant    Physical Exam:  Balance:    -       SBA seated balance  Upper Extremity Range of Motion:     -       Right Upper Extremity: WFL except impairment in 4th and 5th digits due to arthritis   -       Left Upper Extremity: WFL  Upper Extremity Strength:    -       Right Upper Extremity: WFL  -       Left Upper Extremity: WFL   Strength:    -       Right Upper Extremity: fair   -       Left Upper Extremity: fair   Fine Motor Coordination:    -       Intact LUE  -       Impaired  Right hand thumb/finger opposition skills   and Right hand, manipulation of objects    Gross motor coordination:   WFL    AMPAC 6 Click ADL:  AMPAC Total Score: 18    Treatment & Education:  Pt educated on role of OT/POC, importance of OOB/EOB activity, use of call bell, and safety during ADLs, transfers, and functional mobility  Education:    Patient left HOB elevated with all lines intact, call button in reach, bed alarm on and RN present    GOALS:    Multidisciplinary Problems     Occupational Therapy Goals        Problem: Occupational Therapy    Goal Priority Disciplines Outcome Interventions   Occupational Therapy Goal     OT, PT/OT     Description: Goals to be met by: 8/21/22    Patient will increase functional independence with ADLs by performing:    UE Dressing with Supervision.  LE Dressing with Supervision.  Grooming while standing at sink with Supervision.  Toileting from toilet with Supervision for hygiene and clothing management.   Toilet transfer to toilet with Supervision.                     History:     Past Medical History:   Diagnosis Date    Anemia due to multiple mechanisms 6/29/2018    Anemia, chronic disease 6/29/2018    Anemia, chronic renal failure, stage 3 (moderate) 6/29/2018    Anticoagulant long-term use     aspirin    Aortic aneurysm     Chest pain     CHF (congestive heart failure)     COPD (chronic obstructive pulmonary disease)     COPD with acute exacerbation 1/9/2015    Depression     Diabetes mellitus type II     no longer on any DM meds    DVT (deep venous thrombosis) 06/09/2018    Encounter for blood transfusion     GERD (gastroesophageal reflux disease)     GI bleed     Gout     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 8/7/2018    Hip arthritis 3/1/2016    Homocysteinemia 8/7/2018    Hyperlipidemia     Hypertension     Incontinent of urine     Normocytic normochromic anemia 6/29/2018    Oxygen dependent     use oxygen as needed    PE (pulmonary thromboembolism) 06/09/2018    Pneumonia of right lower lobe due to infectious organism 9/11/2017    Syncope     Thyroid disease     hypothyroid    Type 2 diabetes mellitus with stage 3 chronic kidney disease 1/18/2013    UTI (urinary tract infection)        Past Surgical History:   Procedure Laterality Date    APPENDECTOMY      CHOLECYSTECTOMY      COLONOSCOPY Left 10/29/2020    Procedure: COLONOSCOPY;  Surgeon: Singh Kee III, MD;  Location: [place]  ENDO;  Service: Endoscopy;  Laterality: Left;    ESOPHAGOGASTRODUODENOSCOPY Left 10/26/2020    Procedure: EGD (ESOPHAGOGASTRODUODENOSCOPY);  Surgeon: Kareem Gramajo MD;  Location: [facility] ENDO;  Service: Endoscopy;  Laterality: Left;    ESOPHAGOGASTRODUODENOSCOPY Left 10/28/2020    Procedure: EGD (ESOPHAGOGASTRODUODENOSCOPY);  Surgeon: Kareem Gramajo MD;  Location: [facility] ENDO;  Service: Endoscopy;  Laterality: Left;    ESOPHAGOGASTRODUODENOSCOPY N/A 9/16/2021    Procedure: EGD (ESOPHAGOGASTRODUODENOSCOPY);  Surgeon: Kareem Gramajo MD;  Location: [facility] ENDO;  Service: Endoscopy;  Laterality: N/A;    FRACTURE SURGERY      right hip     HERNIA REPAIR      groin    HYSTERECTOMY      INSERTION OF IMPLANTABLE LOOP RECORDER N/A 12/12/2018    Procedure: Insertion, Implantable Loop Recorder;  Surgeon: Ashok Herbert MD;  Location: [facility] CATH LAB;  Service: Cardiology;  Laterality: N/A;    PERCUTANEOUS PINNING OF HIP Left 3/23/2019    Procedure: PINNING, HIP, PERCUTANEOUS;  Surgeon: Jorje Hamlin MD;  Location: [facility] OR;  Service: Orthopedics;  Laterality: Left;    SMALL BOWEL ENTEROSCOPY N/A 10/29/2020    Procedure: ENTEROSCOPY;  Surgeon: Singh Kee III, MD;  Location[facility];  Service: Endoscopy;  Laterality: N/A;    VARICOSE VEIN SURGERY         Time Tracking:     OT Date of Treatment: 07/21/22  OT Start Time: 1056  OT Stop Time: 1111  OT Total Time (min): 15 min    Billable Minutes:Evaluation 7  Self Care/Home Management 8    7/21/2022

## 2022-07-21 NOTE — PT/OT/SLP EVAL
"Physical Therapy Evaluation    Patient Name:  Blaire Cage   MRN:  [id number]    Recommendations:     Discharge Recommendations:  home health PT (and 24/7 assistance)   Discharge Equipment Recommendations: none   Barriers to discharge: None    Assessment:     Blaire Cage is a 89 y.o. female admitted with a medical diagnosis of Acute kidney injury superimposed on chronic kidney disease.  She presents with the following impairments/functional limitations:  weakness, impaired endurance, gait instability, impaired balance, impaired functional mobility, pain.    Pt found HOB elevated and agreeable to working with PT. Pt A & O x  3 and has the following co-morbidities: DM w/ neuropathy, CKD, AAA, CHF.  Pt tolerated session fairly due to soreness from recent fall and required minimal A for safe mobilization during session today. Pt would benefit from acute PT during hospitalization to increase strength, endurance and safety with mobility and would benefit from 24/7 assistance &  PT upon discharge home.      Rehab Prognosis: Fair; patient would benefit from acute skilled PT services to address these deficits and reach maximum level of function.    Recent Surgery: * No surgery found *      Plan:     During this hospitalization, patient to be seen 5 x/week to address the identified rehab impairments via gait training, therapeutic activities, therapeutic exercises and progress toward the following goals:    · Plan of Care Expires:  08/25/22    Subjective     Chief Complaint: generalized soreness due to recent fall  Patient/Family Comments/goals: home with daughter for 24/7 assistance and HH PT  Pain/Comfort:  Pain Rating 1:  (not rated)  Location 1: abdomen  Pain Addressed 1: Reposition, Distraction, Cessation of Activity, Nurse notified  Pain Rating Post-Intervention 1:  (not rated)  Pain Rating 2:  (not rated, "soreness from falling")  Location - Orientation 2: generalized  Pain Addressed 2: Reposition, " Distraction, Cessation of Activity, Nurse notified  Pain Rating Post-Intervention 2:  (not rated)    Patients cultural, spiritual, Baptism conflicts given the current situation:      Living Environment:  Pt reported she lives with one of her daughters in a single story house with no steps to enter.  Prior to admission, patients level of function was Supervision with RW.  Equipment used at home: bedside commode, oxygen, walker, rolling, shower chair.  DME owned (not currently used): none.  Upon discharge, patient will have assistance from her daughters.    Objective:     Communicated with RN prior to session.  Patient found HOB elevated with bed alarm, oxygen, peripheral IV, telemetry  upon PT entry to room.    General Precautions: Standard, fall, diabetic   Orthopedic Precautions:N/A   Braces: N/A  Respiratory Status: Nasal cannula, flow 2 L/min    Exams:  · Cognitive Exam:  Patient is oriented to Person, Place and Situation  · RLE ROM: WFL  · RLE Strength: grossly 4-/5  · LLE ROM: WFL  · LLE Strength: grossly 4-/5    Functional Mobility:  · Bed Mobility:     · Supine to Sit: minimum assistance  · Sit to Supine: minimum assistance  · Transfers:     · Sit to Stand:  contact guard assistance with rolling walker  · Gait: x 50 feet with rolling walker and CGA for safety    Therapeutic Activities and Exercises:   Pt was educated on the following: call light use, importance of OOB activity and functional mobility to negate the negative effects of prolonged bed rest during this hospitalization, safe transfers/ambulation and discharge planning recommendations/options.      AM-PAC 6 CLICK MOBILITY  Total Score:18     Patient left HOB elevated with all lines intact, call button in reach, bed alarm on and RN notified.    GOALS:   Multidisciplinary Problems     Physical Therapy Goals        Problem: Physical Therapy    Goal Priority Disciplines Outcome Goal Variances Interventions   Physical Therapy Goal     PT, PT/OT       Description: Goals to be met by: 22     Patient will increase functional independence with mobility by performin. Supine to sit with Supervision  2. Sit to stand transfer with Supervision  3. Bed to chair transfer with Supervision using Rolling Walker  4. Gait  x 150 feet with Supervision using Rolling Walker.                        History:     Past Medical History:   Diagnosis Date    Anemia due to multiple mechanisms 2018    Anemia, chronic disease 2018    Anemia, chronic renal failure, stage 3 (moderate) 2018    Anticoagulant long-term use     aspirin    Aortic aneurysm     Chest pain     CHF (congestive heart failure)     COPD (chronic obstructive pulmonary disease)     COPD with acute exacerbation 2015    Depression     Diabetes mellitus type II     no longer on any DM meds    DVT (deep venous thrombosis) 2018    Encounter for blood transfusion     GERD (gastroesophageal reflux disease)     GI bleed     Gout     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 2018    Hip arthritis 3/1/2016    Homocysteinemia 2018    Hyperlipidemia     Hypertension     Incontinent of urine     Normocytic normochromic anemia 2018    Oxygen dependent     use oxygen as needed    PE (pulmonary thromboembolism) 2018    Pneumonia of right lower lobe due to infectious organism 2017    Syncope     Thyroid disease     hypothyroid    Type 2 diabetes mellitus with stage 3 chronic kidney disease 2013    UTI (urinary tract infection)        Past Surgical History:   Procedure Laterality Date    APPENDECTOMY      CHOLECYSTECTOMY      COLONOSCOPY Left 10/29/2020    Procedure: COLONOSCOPY;  Surgeon: Singh Kee III, MD;  Location: [facility];  Service: Endoscopy;  Laterality: Left;    ESOPHAGOGASTRODUODENOSCOPY Left 10/26/2020    Procedure: EGD (ESOPHAGOGASTRODUODENOSCOPY);  Surgeon: Kareem Gramajo MD;  Location: [facility];  Service: Endoscopy;   Laterality: Left;    ESOPHAGOGASTRODUODENOSCOPY Left 10/28/2020    Procedure: EGD (ESOPHAGOGASTRODUODENOSCOPY);  Surgeon: Kareem Gramajo MD;  Location: [facility];  Service: Endoscopy;  Laterality: Left;    ESOPHAGOGASTRODUODENOSCOPY N/A 9/16/2021    Procedure: EGD (ESOPHAGOGASTRODUODENOSCOPY);  Surgeon: Kareem Gramajo MD;  Location: [facility];  Service: Endoscopy;  Laterality: N/A;    FRACTURE SURGERY      right hip     HERNIA REPAIR      groin    HYSTERECTOMY      INSERTION OF IMPLANTABLE LOOP RECORDER N/A 12/12/2018    Procedure: Insertion, Implantable Loop Recorder;  Surgeon: Ashok Herbert MD;  Location: [facility] CATH LAB;  Service: Cardiology;  Laterality: N/A;    PERCUTANEOUS PINNING OF HIP Left 3/23/2019    Procedure: PINNING, HIP, PERCUTANEOUS;  Surgeon: Jorje Hamlin MD;  Location: [facility] OR;  Service: Orthopedics;  Laterality: Left;    SMALL BOWEL ENTEROSCOPY N/A 10/29/2020    Procedure: ENTEROSCOPY;  Surgeon: Singh Kee III, MD;  Location: [facility];  Service: Endoscopy;  Laterality: N/A;    VARICOSE VEIN SURGERY         Time Tracking:     PT Received On: 07/21/22  PT Start Time: 0944     PT Stop Time: 1000  PT Total Time (min): 16 min     Billable Minutes: Evaluation 8 and Gait Training 8      07/21/2022

## 2022-07-21 NOTE — PLAN OF CARE
Goals to be met by: 22     Patient will increase functional independence with mobility by performin. Supine to sit with Supervision  2. Sit to stand transfer with Supervision  3. Bed to chair transfer with Supervision using Rolling Walker  4. Gait  x 150 feet with Supervision using Rolling Walker.

## 2022-07-21 NOTE — PLAN OF CARE
daughter / Annabella Guerra [phone] called  and informed that patients other daughter had rollater ordered and will be delivered to home 7.22.22 No DME needed at discharge.

## 2022-07-21 NOTE — PLAN OF CARE
GOLDEN Completed via phone with daughter / Annabella Guerra [phone] due to patient sleeping. Daughter verbalized understanding and agreement for form to be completed that it was reviewed via telephone. Copy provided in patients room, scanned into epic.       07/21/22 6961   GOLDEN Message   Medicare Outpatient and Observation Notification regarding financial responsibility Given to patient/caregiver;Explained to patient/caregiver;Other (comments)  (Completed via phone with daughter / Annabella Guerra [phone])   Date GOLDEN was signed 07/21/22   Time GOLDEN was signed [id number]

## 2022-07-21 NOTE — PROGRESS NOTES
[facility] Medicine    Progress Note    Patient Name: Blaire Cage  MRN: [id number]  Patient Class: OP- Observation   Admission Date: 7/20/2022 12:59 PM  Length of Stay: 0  Attending Physician: Cristal Car MD  Primary Care Provider: Eli Edmonds MD  Face-to-Face encounter date: 07/21/2022  Code status:  Chief Complaint: Bloated (Patient reports bloating, belching, and gas x a few days )        Subjective:    HPI: Per [facility] NP  History was obtained from the patient and collateral obtained from the family present at the bedside and ER physician Sign-out. Patient is a 89-year-old female with history of anemia, CHF, COPD, depression, type 2 diabetes, GERD, PE, pneumonia with the complaint of  abdominal bloating the last couple of days.  The patient admits that she has had some increased belching.  Her last stool was 2 days ago.  She had an appointment to see her physician today but missed the appointment because she felt too weak to ambulate. The patient had seen Dr. Gramajo recently and was told that some of her bowel problems may be related to the use of iron supplementation.  She was advised to discontinue iron tablets and was started on Gas-X lactulose and MiraLax.  Currently she denies any difficulty urinating. Daughter reports the patient has good appetite but she has to be encouraged to drink water.   Patient denies fever, chills, chest pain increased sob, nausea, vomiting, abdominal pain, dysuria, hematuria, diarrhea, melena , numbness, tingling or LOC.  In the ED patient is afebrile. She is hypertensive. CBC with chronic anemia. H/H appears to be at patient's baseline 10/32. CMP with potassium 5.5, bun 49, creat 2.3. CT abdomen shows constipation. Stable 4.8cm infrarenal AAA with diffuse vascular calcification. The patient is treated with meds to shift Potassium in the ED.      Interval History:   7/21: Patient is doing well and states she has not moved her bowels in 4 day.  One time dose of Lactulose. No concerns/issues overnight reported by the patient or the nursing staff.    Review of Systems All other Review of Systems were found to be negative expect for that mentioned already in HPI.     Objective:     Vitals:    07/21/22 0403 07/21/22 0732 07/21/22 1145 07/21/22 1659   BP: (!) 140/63 (!) 160/74 (!) 170/81 (!) 133/59   Pulse: 71 75 (!) 58 75   Resp: 18 18 18 18   Temp: 98.2 °F (36.8 °C) 98.1 °F (36.7 °C) 98.2 °F (36.8 °C) 98.1 °F (36.7 °C)   TempSrc: Oral Oral Oral Oral   SpO2: 100% 100% 96% 99%   Weight:       Height:            Vitals reviewed.  Constitutional: No distress.   HENT: NC  Head: Atraumatic.   Cardiovascular: Normal rate, regular rhythm and normal heart sounds.   Pulmonary/Chest: Effort normal. No wheezes.   Abdominal: Soft. Bowel sounds are normal. No distension and no mass. No tenderness  Neurological: Alert.   Skin: Skin is warm and dry.   Psych: Appropriate mood and affect    Following labs were Reviewed   CBC:  Recent Labs   Lab 07/21/22  0538   WBC 6.15   HGB 9.0*   HCT 28.0*        CMP:  Recent Labs   Lab 07/21/22  0538   CALCIUM 8.1*      K 4.8   CO2 26      BUN 50*   CREATININE 2.1*       Micro Results  Microbiology Results (last 7 days)     Procedure Component Value Units Date/Time    Urine culture [[id number]] Collected: 07/21/22 1017    Order Status: No result Specimen: Urine Updated: 07/21/22 1108           Radiology Reports  X-Ray Chest AP Portable  Result Date: 7/20/2022  IMPRESSION: Scarring involving the right lung base. No acute pulmonary pathology. Electronically signed by:  Harjeet Mantilla MD  7/20/2022 5:17 PM CDT Workstation: [id number]    CT Abdomen Pelvis  Without Contrast  Result Date: 7/20/2022  IMPRESSION: Moderate amount of fecal material within the right colon compatible with constipation. There is no evidence of obstruction Stable 4.8 cm infrarenal abdominal aortic aneurysm with diffuse vascular calcification Prior  cholecystectomy and hysterectomy. Small hiatal hernia Stable atelectasis in the right lung base Electronically signed by:  Jonna Lyn MD  7/20/2022 3:59 PM CDT Workstation: [id number]       Meds  Scheduled Meds:   allopurinoL  100 mg Oral Daily    ascorbic acid (vitamin C)  500 mg Oral Daily    atorvastatin  80 mg Oral Daily    cloNIDine  0.1 mg Oral TID    diltiaZEM  180 mg Oral Daily    ferrous sulfate  1 tablet Oral Daily    fluticasone-umeclidin-vilanter  1 puff Inhalation Daily    levothyroxine  88 mcg Oral Before breakfast    montelukast  10 mg Oral QHS    multivitamin  1 tablet Oral Daily    pantoprazole  40 mg Oral Daily    senna-docusate 8.6-50 mg  1 tablet Oral BID    sertraline  25 mg Oral Daily     Continuous Infusions:   sodium chloride 0.9%       PRN Meds:.acetaminophen, albuterol, dextrose 50%, dextrose 50%, glucagon (human recombinant), glucose, glucose, insulin aspart U-100, ondansetron, simethicone, sodium chloride 0.9%.    Assessment & Plan:     Active Hospital Problems    Diagnosis    *Acute kidney injury superimposed on chronic kidney disease 3  Continue IVF  Avoid nephrotoxic medications  Continue to monitor      Chronic diastolic heart failure  Compensated  Continue to monitor      Hyperkalemia  Resolved      Constipation  Continue docusate and Lactulose      Controlled type 2 diabetes mellitus, without long-term current use of insulin  Insulin sliding scale  Accucheck           Discharge Planning:   Is the patient medically ready for discharge?: no    Reason for patient still in hospital (select all that apply): Patient trending condition and Treatment    Above encounter included review of the medical records, interviewing and examining the patient face-to-face, discussion with family and other health care providers, ordering and interpreting lab/test results and formulating a plan of care.     Medical Decision Making:      [_] Low Complexity  [_] Moderate  Complexity  [x] High Complexity      Cristal Car MD  Department of Hospital Medicine   [facility]

## 2022-07-21 NOTE — CARE UPDATE
07/21/22 0732   Patient Assessment/Suction   Level of Consciousness (AVPU) alert   All Lung Fields Breath Sounds clear;diminished   PRE-TX-O2   O2 Device (Oxygen Therapy) nasal cannula   $ Is the patient on Low Flow Oxygen? Yes   Flow (L/min) 4  (decrease to 2)   SpO2 100 %   Pulse Oximetry Type Intermittent   $ Pulse Oximetry - Multiple Charge Pulse Oximetry - Multiple   Pulse 75   Resp 18   Temp 98.1 °F (36.7 °C)   BP (!) 160/74   Inhaler   $ Inhaler Charges Other (see comments)  (med not available)   Education   $ Education Bronchodilator;15 min   Respiratory Evaluation   $ Care Plan Tech Time 15 min

## 2022-07-21 NOTE — PLAN OF CARE
[facility]  Initial Discharge Assessment       Primary Care Provider: Eli Edmonds MD    Admission Diagnosis: SHALINI (acute kidney injury) [N17.9]    Admission Date: 7/20/2022  Expected Discharge Date: TBD    Assessment completed via phone with Daughter / Annabella Guerra [phone]. She confirmed two patient identifiers and provided the following information:    Patient lives with her at [address], [place], but does not wish to change the address on demographics as patient still owns home listed on demographics. Patient does not currently have home health, but if home health is ordered: [place] is preferred agency. Patient has home oxygen through [facility] and family will bring a portable tank at discharge for patient to transport home. Patient has potty chair and shower chair. Patient request rollater as currently rollater was received over 5 years ago and brakes no longer work. Case management to continue to follow.    Discharge Barriers Identified: None    Payor: [employer]ARE / Plan: [employer] CHOICES 65 / Product Type: Medicare Advantage /     Extended Emergency Contact Information  Primary Emergency Contact: Annabella Guerra  Address: [address] [place] [address] [facility]  Home Phone: [phone]  Mobile Phone: [phone]  Relation: Daughter  Preferred language: English   needed? No  Secondary Emergency Contact: Toya Hendrickson  Address: [address] [place] [address] [facility]  Home Phone: [phone]  Mobile Phone: [phone]  Relation: Daughter  Preferred language: English   needed? No    Discharge Plan A: Home with family  Discharge Plan B: Home with family      [facility]  [place] - [address]  [address]  [place] [address]  Phone: [phone] Fax: [phone]    [facility]  [address]  [place]  [address]  Phone: [phone] Fax: [phone]    [facility] [place] [place] - [address]  [address] [place] [address]  Phone: [phone] Fax: [phone]      Initial Assessment (most recent)     Adult Discharge Assessment - 07/21/22 1106        Discharge Assessment    Assessment Type Discharge Planning Assessment     Confirmed/corrected address, phone number and insurance Yes     Confirmed Demographics Correct on Facesheet     Source of Information family;health record     If unable to respond/provide information was family/caregiver contacted? Yes     Contact Name/Number Daughter Sierra Guerra [phone]     Does patient/caregiver understand observation status Yes     Communicated ELYSSA with patient/caregiver Date not available/Unable to determine     Reason For Admission Acute kidney injury superimposed on chronic kidney disease 3     Lives With child(shreyas), adult     Facility Arrived From: home with daughter     Do you expect to return to your current living situation? Yes     Do you have help at home or someone to help you manage your care at home? Yes     Who are your caregiver(s) and their phone number(s)? Amelia Guerra [phone]     Prior to hospitilization cognitive status: Unable to Assess     Current cognitive status: Unable to Assess     Walking or Climbing Stairs Difficulty ambulation difficulty, requires equipment;stair climbing difficulty, requires equipment     Dressing/Bathing Difficulty bathing difficulty, requires equipment     Home Accessibility wheelchair accessible     Equipment Currently Used at Home oxygen;bedside commode;shower chair;walker, rolling     Readmission within 30 days? No     Patient currently being followed by outpatient case management? No     Do you currently have service(s) that help you manage your care at home? No     Do you take prescription medications? Yes     Do you have prescription coverage? Yes     Do you have any problems  affording any of your prescribed medications? No     Is the patient taking medications as prescribed? yes     Who is going to help you get home at discharge? Daughter / Annabella Guerra [phone]     How do you get to doctors appointments? family or friend will provide     Are you on dialysis? No     Do you take coumadin? No     Discharge Plan A Home with family     Discharge Plan B Home with family     DME Needed Upon Discharge  rollator     Discharge Plan discussed with: Adult children     Discharge Barriers Identified None        Relationship/Environment    Name(s) of Who Lives With Patient Daughter / Annabella Guerra [phone]

## 2022-07-22 NOTE — CARE UPDATE
07/22/22 0749   Patient Assessment/Suction   Level of Consciousness (AVPU) alert   Respiratory Effort Unlabored   Expansion/Accessory Muscles/Retractions no use of accessory muscles   All Lung Fields Breath Sounds clear;equal bilaterally   PRE-TX-O2   O2 Device (Oxygen Therapy) nasal cannula  (Pt uses 2L at home)   $ Is the patient on Low Flow Oxygen? Yes   Flow (L/min) 2   SpO2 98 %   Pulse Oximetry Type Intermittent   $ Pulse Oximetry - Multiple Charge Pulse Oximetry - Multiple   Pulse 61   Inhaler   $ Inhaler Charges MDI (Metered Dose Inahler) Treatment   Daily Review of Necessity (Inhaler) completed   Respiratory Treatment Status (Inhaler) given   Treatment Route (Inhaler) mouthpiece   Patient Position (Inhaler) HOB elevated   Post Treatment Assessment (Inhaler) breath sounds unchanged   Signs of Intolerance (Inhaler) none   Respiratory Evaluation   $ Care Plan Tech Time 15 min

## 2022-07-22 NOTE — PROGRESS NOTES
[facility] Medicine    Progress Note    Patient Name: Blaire Cage  MRN: [id number]  Patient Class: OP- Observation   Admission Date: 7/20/2022 12:59 PM  Length of Stay: 0  Attending Physician: Cristal Car MD  Primary Care Provider: Eli Edmonds MD  Face-to-Face encounter date: 07/22/2022  Code status:  Chief Complaint: Bloated (Patient reports bloating, belching, and gas x a few days )        Subjective:    HPI: Per Restorationist NP  History was obtained from the patient and collateral obtained from the family present at the bedside and ER physician Sign-out. Patient is a 89-year-old female with history of anemia, CHF, COPD, depression, type 2 diabetes, GERD, PE, pneumonia with the complaint of  abdominal bloating the last couple of days.  The patient admits that she has had some increased belching.  Her last stool was 2 days ago.  She had an appointment to see her physician today but missed the appointment because she felt too weak to ambulate. The patient had seen Dr. Gramajo recently and was told that some of her bowel problems may be related to the use of iron supplementation.  She was advised to discontinue iron tablets and was started on Gas-X lactulose and MiraLax.  Currently she denies any difficulty urinating. Daughter reports the patient has good appetite but she has to be encouraged to drink water.   Patient denies fever, chills, chest pain increased sob, nausea, vomiting, abdominal pain, dysuria, hematuria, diarrhea, melena , numbness, tingling or LOC.  In the ED patient is afebrile. She is hypertensive. CBC with chronic anemia. H/H appears to be at patient's baseline 10/32. CMP with potassium 5.5, bun 49, creat 2.3. CT abdomen shows constipation. Stable 4.8cm infrarenal AAA with diffuse vascular calcification. The patient is treated with meds to shift Potassium in the ED.      Interval History:   7/21: Patient is doing well and states she has not moved her bowels in 4 day.  One time dose of Lactulose. No concerns/issues overnight reported by the patient or the nursing staff.    7/22:  Patient passing flattens but still has not had a bowel movement.  Urine cultures growing Pseudomonas, would start patient on IV Zosyn.    Review of Systems All other Review of Systems were found to be negative expect for that mentioned already in HPI.     Objective:     Vitals:    07/22/22 0420 07/22/22 0749 07/22/22 0800 07/22/22 1149   BP: (!) 173/64  (!) 156/79 (!) 106/53   BP Location: Left arm      Patient Position: Lying      Pulse: 67 61 70 (!) 56   Resp: 15  18 18   Temp: 98.6 °F (37 °C)  98.6 °F (37 °C) 98.2 °F (36.8 °C)   TempSrc: Oral  Oral Oral   SpO2: 99% 98% 97% 98%   Weight:       Height:            Vitals reviewed.  Constitutional: No distress.   HENT: NC  Head: Atraumatic.   Cardiovascular: Normal rate, regular rhythm and normal heart sounds.   Pulmonary/Chest: Effort normal. No wheezes.   Abdominal: Soft. Bowel sounds are normal. No distension and no mass. No tenderness  Neurological: Alert.   Skin: Skin is warm and dry.   Psych: Appropriate mood and affect    Following labs were Reviewed   CBC:  Recent Labs   Lab 07/22/22  0521   WBC 6.14   HGB 9.0*   HCT 28.3*   *     CMP:  Recent Labs   Lab 07/22/22  0521   CALCIUM 8.1*      K 5.0   CO2 25      BUN 50*   CREATININE 2.3*       Micro Results  Microbiology Results (last 7 days)     Procedure Component Value Units Date/Time    Urine culture [[id number]]  (Abnormal) Collected: 07/21/22 1017    Order Status: Completed Specimen: Urine Updated: 07/22/22 0806     Urine Culture, Routine PRESUMPTIVE PSEUDOMONAS SPECIES  >100,000 cfu/ml  Identification and susceptibility pending      Narrative:      Specimen Source->Urine           Radiology Reports  X-Ray Chest AP Portable  Result Date: 7/20/2022  IMPRESSION: Scarring involving the right lung base. No acute pulmonary pathology. Electronically signed by:  Harjeet Mantilla MD   7/20/2022 5:17 PM CDT Workstation: [id number]    CT Abdomen Pelvis  Without Contrast  Result Date: 7/20/2022  IMPRESSION: Moderate amount of fecal material within the right colon compatible with constipation. There is no evidence of obstruction Stable 4.8 cm infrarenal abdominal aortic aneurysm with diffuse vascular calcification Prior cholecystectomy and hysterectomy. Small hiatal hernia Stable atelectasis in the right lung base Electronically signed by:  Jonna Lyn MD  7/20/2022 3:59 PM CDT Workstation: [id number]       Meds  Scheduled Meds:   allopurinoL  100 mg Oral Daily    ascorbic acid (vitamin C)  500 mg Oral Daily    atorvastatin  80 mg Oral Daily    cloNIDine  0.1 mg Oral TID    diltiaZEM  180 mg Oral Daily    ferrous sulfate  1 tablet Oral Daily    fluticasone-umeclidin-vilanter  1 puff Inhalation Daily    lactulose  20 g Oral TID    levothyroxine  88 mcg Oral Before breakfast    montelukast  10 mg Oral QHS    multivitamin  1 tablet Oral Daily    pantoprazole  40 mg Oral Daily    piperacillin-tazobactam (ZOSYN) IVPB  3.375 g Intravenous Q8H    senna-docusate 8.6-50 mg  1 tablet Oral BID    sertraline  25 mg Oral Daily     Continuous Infusions:    PRN Meds:.acetaminophen, albuterol, dextrose 50%, dextrose 50%, glucagon (human recombinant), glucose, glucose, insulin aspart U-100, ondansetron, simethicone, sodium chloride 0.9%.    Assessment & Plan:     Active Hospital Problems    Diagnosis    *Acute kidney injury superimposed on chronic kidney disease 3  Resolved  Kidney function at baseline  Avoid nephrotoxic medications  Continue to monitor    UTI  IV Zosyn    Chronic diastolic heart failure  Compensated  Continue to monitor      Hyperkalemia  Resolved      Constipation  Continue docusate and Lactulose      Controlled type 2 diabetes mellitus, without long-term current use of insulin  Insulin sliding scale  Accucheck           Discharge Planning:   Is the patient medically ready  for discharge?: no    Reason for patient still in hospital (select all that apply): Patient trending condition and Treatment    Above encounter included review of the medical records, interviewing and examining the patient face-to-face, discussion with family and other health care providers, ordering and interpreting lab/test results and formulating a plan of care.     Medical Decision Making:      [_] Low Complexity  [_] Moderate Complexity  [x] High Complexity      Cristal Car MD  Department of Hospital Medicine   [facility]

## 2022-07-22 NOTE — PLAN OF CARE
[facility] confirmed patient has been assigned to [facility] with start of care 7.23.22

## 2022-07-22 NOTE — PT/OT/SLP PROGRESS
Occupational Therapy   Treatment    Name: Blaire Cage  MRN: [id number]  Admitting Diagnosis:  Acute kidney injury superimposed on chronic kidney disease       Recommendations:     Discharge Recommendations: home health OT  Discharge Equipment Recommendations:  none  Barriers to discharge:  None    Assessment:     Blaire Cage is a 89 y.o. female with a medical diagnosis of Acute kidney injury superimposed on chronic kidney disease.  She presents agreeable to OT session. Performance deficits affecting function are weakness, impaired endurance, impaired functional mobility, decreased safety awareness, impaired cardiopulmonary response to activity.     Rehab Prognosis:  Good; patient would benefit from acute skilled OT services to address these deficits and reach maximum level of function.       Plan:     Patient to be seen 3 x/week to address the above listed problems via self-care/home management, therapeutic exercises, therapeutic activities  · Plan of Care Expires:    · Plan of Care Reviewed with: patient    Subjective     Pain/Comfort:  · Pain Rating 1: 0/10  · Pain Rating Post-Intervention 1: 0/10    Objective:     Communicated with: nurse prior to session.  Patient found HOB elevated with peripheral IV, telemetry, oxygen upon OT entry to room.    General Precautions: Standard, fall, diabetic   Orthopedic Precautions:N/A   Braces: N/A  Respiratory Status: Nasal cannula, flow 2 L/min     Occupational Performance:     Bed Mobility:    · Patient completed Scooting/Bridging with stand by assistance  · Patient completed Supine to Sit with stand by assistance  · Patient completed Sit to Supine with stand by assistance     Functional Mobility/Transfers:  · Patient completed Sit <> Stand Transfer with stand by assistance  with  rolling walker   · Functional Mobility: Ambulated 20' with RW, CGA. Pt a little fearful with ambulation    Activities of Daily Living:  · Grooming: stand by assistance while sitting  EOB      [facility] 6 Click ADL:      Treatment & Education:  Role of OT, call bell use.    Patient left HOB elevated with all lines intact, call button in reach and bed alarm onEducation:      GOALS:   Multidisciplinary Problems     Occupational Therapy Goals        Problem: Occupational Therapy    Goal Priority Disciplines Outcome Interventions   Occupational Therapy Goal     OT, PT/OT     Description: Goals to be met by: 8/21/22    Patient will increase functional independence with ADLs by performing:    UE Dressing with Supervision.  LE Dressing with Supervision.  Grooming while standing at sink with Supervision.  Toileting from toilet with Supervision for hygiene and clothing management.   Toilet transfer to toilet with Supervision.                     Time Tracking:     OT Date of Treatment: 07/22/22  OT Start Time: 0907  OT Stop Time: 0932  OT Total Time (min): 25 min    Billable Minutes:Self Care/Home Management 10  Therapeutic Activity 15    OT/YIN: OT          7/22/2022

## 2022-07-22 NOTE — PLAN OF CARE
faxed orders for home health to [facility].        07/22/22 0902   Post-Acute Status   Post-Acute Authorization Home Health   Home Health Status Referrals Sent   Patient choice form signed by patient/caregiver   ([facility] to assign)   Discharge Plan   Discharge Plan A Home Health   Discharge Plan B Home Health

## 2022-07-22 NOTE — PT/OT/SLP PROGRESS
Physical Therapy      Patient Name:  Blaire Cage   MRN:  [id number]    Patient not seen today secondary to Patient unwilling to participate, Pain, Patient ill (Comment) (Pt persists with stomach pain and constipation.). Will follow-up 7/23/22.

## 2022-07-22 NOTE — CARE UPDATE
07/22/22 0007   PRE-TX-O2   O2 Device (Oxygen Therapy) nasal cannula   $ Is the patient on Low Flow Oxygen? Yes   Flow (L/min) 2   SpO2 99 %   Pulse Oximetry Type Intermittent   $ Pulse Oximetry - Multiple Charge Pulse Oximetry - Multiple   Aerosol Therapy   $ Aerosol Therapy Charges PRN treatment not required   Education   $ Education Bronchodilator;15 min   Respiratory Evaluation   $ Care Plan Tech Time 15 min

## 2022-07-23 PROBLEM — E87.5 HYPERKALEMIA: Status: RESOLVED | Noted: 2018-09-13 | Resolved: 2022-01-01

## 2022-07-23 NOTE — DISCHARGE INSTRUCTIONS
Your found to have a UTI and we have placed you on oral Levaquin to take every other day till completed.  You are also encouraged to take MiraLax every day to ensure you move your bowel

## 2022-07-23 NOTE — PLAN OF CARE
Problem: Adult Inpatient Plan of Care  Goal: Plan of Care Review  Outcome: Met  Goal: Patient-Specific Goal (Individualized)  Outcome: Met  Goal: Absence of Hospital-Acquired Illness or Injury  Outcome: Met  Goal: Optimal Comfort and Wellbeing  Outcome: Met  Goal: Readiness for Transition of Care  Outcome: Met     Problem: Diabetes Comorbidity  Goal: Blood Glucose Level Within Targeted Range  Outcome: Met     Problem: Fluid and Electrolyte Imbalance (Acute Kidney Injury/Impairment)  Goal: Fluid and Electrolyte Balance  Outcome: Met     Problem: Oral Intake Inadequate (Acute Kidney Injury/Impairment)  Goal: Optimal Nutrition Intake  Outcome: Met     Problem: Renal Function Impairment (Acute Kidney Injury/Impairment)  Goal: Effective Renal Function  Outcome: Met     Problem: Fall Injury Risk  Goal: Absence of Fall and Fall-Related Injury  Outcome: Met     Problem: Mobility Impairment  Goal: Optimal Mobility  Outcome: Met     Problem: Bleeding (Orthopaedic Fracture)  Goal: Absence of Bleeding  Outcome: Met     Problem: Embolism (Orthopaedic Fracture)  Goal: Absence of Embolism Signs and Symptoms  Outcome: Met     Problem: Fracture Stabilization and Management (Orthopaedic Fracture)  Goal: Fracture Stability  Outcome: Met     Problem: Functional Ability Impaired (Orthopaedic Fracture)  Goal: Optimal Functional Ability  Outcome: Met     Problem: Infection (Orthopaedic Fracture)  Goal: Absence of Infection Signs and Symptoms  Outcome: Met     Problem: Neurovascular Compromise (Orthopaedic Fracture)  Goal: Effective Tissue Perfusion  Outcome: Met     Problem: Pain (Orthopaedic Fracture)  Goal: Acceptable Pain Control  Outcome: Met     Problem: Respiratory Compromise (Orthopaedic Fracture)  Goal: Effective Oxygenation and Ventilation  Outcome: Met

## 2022-07-23 NOTE — PLAN OF CARE
07/23/22 1303   Final Note   Assessment Type Final Discharge Note   Anticipated Discharge Disposition Home-Health   What phone number can be called within the next 1-3 days to see how you are doing after discharge? [phone]   Post-Acute Status   Post-Acute Authorization Home Health   Home Health Status Set-up Complete/Auth obtained   Discharge Delays None known at this time   Patient to start  services with [facility] upon discharge 7.24.22.  faxed discharged orders to [facility] for review and follow up. Patient is cleared for discharge from Case Management standpoint.  Assigned RN notified via instant chat message.   Abdomen soft with mild distension but no tenderness to palpation, no rebound, no guarding and no masses. no hepatosplenomegaly.

## 2022-07-23 NOTE — HOSPITAL COURSE
Per Maximiliano NP  History was obtained from the patient and collateral obtained from the family present at the bedside and ER physician Sign-out. Patient is a 89-year-old female with history of anemia, CHF, COPD, depression, type 2 diabetes, GERD, PE, pneumonia with the complaint of  abdominal bloating the last couple of days.  The patient admits that she has had some increased belching.  Her last stool was 2 days ago.  She had an appointment to see her physician today but missed the appointment because she felt too weak to ambulate. The patient had seen Dr. Gramajo recently and was told that some of her bowel problems may be related to the use of iron supplementation.  She was advised to discontinue iron tablets and was started on Gas-X lactulose and MiraLax.  Currently she denies any difficulty urinating. Daughter reports the patient has good appetite but she has to be encouraged to drink water.   Patient denies fever, chills, chest pain increased sob, nausea, vomiting, abdominal pain, dysuria, hematuria, diarrhea, melena , numbness, tingling or LOC.  In the ED patient is afebrile. She is hypertensive. CBC with chronic anemia. H/H appears to be at patient's baseline 10/32. CMP with potassium 5.5, bun 49, creat 2.3. CT abdomen shows constipation. Stable 4.8cm infrarenal AAA with diffuse vascular calcification. The patient is treated with meds to shift Potassium in the ED.      Interval History:   7/21: Patient is doing well and states she has not moved her bowels in 4 day. One time dose of Lactulose. No concerns/issues overnight reported by the patient or the nursing staff.

## 2022-07-23 NOTE — CARE UPDATE
07/23/22 0815   Patient Assessment/Suction   Level of Consciousness (AVPU) alert   Respiratory Effort Unlabored   Expansion/Accessory Muscles/Retractions no use of accessory muscles;no retractions;expansion symmetric   All Lung Fields Breath Sounds clear;equal bilaterally   PRE-TX-O2   O2 Device (Oxygen Therapy) nasal cannula   $ Is the patient on Low Flow Oxygen? Yes   Flow (L/min) 2   SpO2 95 %   Pulse Oximetry Type Intermittent   $ Pulse Oximetry - Multiple Charge Pulse Oximetry - Multiple   Pulse (!) 58   Resp 18   Temp 98.5 °F (36.9 °C)   BP (!) 173/87   Inhaler   $ Inhaler Charges MDI (Metered Dose Inahler) Treatment   Daily Review of Necessity (Inhaler) completed   Respiratory Treatment Status (Inhaler) given   Treatment Route (Inhaler) mouthpiece   Patient Position (Inhaler) Shetty's   Post Treatment Assessment (Inhaler) breath sounds unchanged   Signs of Intolerance (Inhaler) none   Education   $ Education 15 min   Respiratory Evaluation   $ Care Plan Tech Time 15 min

## 2022-07-23 NOTE — PLAN OF CARE
07/23/22 1300   Post-Acute Status   Post-Acute Authorization Home Health   Home Health Status Set-up Complete/Auth obtained   Discharge Delays None known at this time   Discharge Plan   Discharge Plan A Home Health   Discharge Plan B Home Health       Patient to start HH services with [facility] upon discharge 7.24.22. CM faxed discharged orders to [facility] for review and follow up. Patient is cleared for discharge from Case Management standpoint.  Assigned RN notified via instant chat message.

## 2022-07-23 NOTE — DISCHARGE SUMMARY
[facility] Medicine  Discharge Summary      Patient Name: Blaire Cage  MRN: [id number]  Patient Class: IP- Inpatient  Admission Date: 7/20/2022  Hospital Length of Stay: 1 days  Discharge Date and Time:  07/23/2022 11:37 AM  Attending Physician: Cristal Car MD   Discharging Provider: Cristal Car MD  Primary Care Provider: Eli Edmonds MD      HPI:   Per Church NP  History was obtained from the patient and collateral obtained from the family present at the bedside and ER physician Sign-out. Patient is a 89-year-old female with history of anemia, CHF, COPD, depression, type 2 diabetes, GERD, PE, pneumonia with the complaint of  abdominal bloating the last couple of days.  The patient admits that she has had some increased belching.  Her last stool was 2 days ago.  She had an appointment to see her physician today but missed the appointment because she felt too weak to ambulate. The patient had seen Dr. Gramajo recently and was told that some of her bowel problems may be related to the use of iron supplementation.  She was advised to discontinue iron tablets and was started on Gas-X lactulose and MiraLax.  Currently she denies any difficulty urinating. Daughter reports the patient has good appetite but she has to be encouraged to drink water.   Patient denies fever, chills, chest pain increased sob, nausea, vomiting, abdominal pain, dysuria, hematuria, diarrhea, melena , numbness, tingling or LOC.  In the ED patient is afebrile. She is hypertensive. CBC with chronic anemia. H/H appears to be at patient's baseline 10/32. CMP with potassium 5.5, bun 49, creat 2.3. CT abdomen shows constipation. Stable 4.8cm infrarenal AAA with diffuse vascular calcification. The patient is treated with meds to shift Potassium in the ED.     * No surgery found *      Hospital Course:   7/21: Patient is doing well and states she has not moved her bowels in 4 day. One time dose of Lactulose. No  concerns/issues overnight reported by the patient or the nursing staff.    7/22:  Patient still having difficulty moving bowel, will start patient on lactulose t.i.d..  Urine cultures growing Pseudomonas will start patient on IV Zosyn.    7/23:  Patient doing significantly better has moved her bowel.  Patient counseled on diet, and subsequently discharged on oral antibiotics for UTI.    Physical examination on discharge:  Constitutional: No distress.   HENT: NC  Head: Atraumatic.   Cardiovascular: Normal rate, regular rhythm and normal heart sounds.   Pulmonary/Chest: Effort normal. No wheezes.   Abdominal: Soft. Bowel sounds are normal. No distension and no mass. No tenderness  Neurological: Alert.   Skin: Skin is warm and dry.   Psych: Appropriate mood and affect    I have seen the patient on the day of discharge and reviewed the discharge instructions as outlined.         Goals of Care Treatment Preferences:  Code Status: Full Code      Consults:   Consults (From admission, onward)        Status Ordering Provider     Inpatient consult to Social Work/Case Management  Once        Provider:  (Not yet assigned)    Acknowledged ALFREDA LLOYD     IP consult to case management  Once        Provider:  (Not yet assigned)    Completed FIDENCIO NAVARRO     Inpatient consult to Hospitalist  Once        Provider:  Fidencio Navarro MD    Acknowledged LILIAN RODARTE JR          No new Assessment & Plan notes have been filed under this hospital service since the last note was generated.  Service: Hospital Medicine    Final Active Diagnoses:    Diagnosis Date Noted POA    PRINCIPAL PROBLEM:  Acute kidney injury superimposed on chronic kidney disease 3 [N17.9, N18.9] 06/28/2018 Yes    Chronic diastolic heart failure [I50.32] 08/18/2020 Yes    Constipation [K59.00] 06/28/2018 Yes    Controlled type 2 diabetes mellitus, without long-term current use of insulin [E11.9] 06/09/2018 Yes    UTI (urinary tract infection)  [N39.0] 05/21/2015 Yes      Problems Resolved During this Admission:    Diagnosis Date Noted Date Resolved POA    Hyperkalemia [E87.5] 09/13/2018 07/23/2022 Yes       Discharged Condition: good    Disposition: Home-Health Care Svc    Follow Up:   Contact information for follow-up providers     Eli Edmonds MD Follow up in 1 week(s).    Specialty: Family Medicine  Contact information:  [address]  [place] [address]  [phone]                   Contact information for after-discharge care     Home Medical Care     [facility] .    Service: Home Health Services  Contact information:  [address] [place] [address]  [phone]                           Patient Instructions:      Ambulatory referral/consult to Home Health   Standing Status: Future   Referral Priority: Routine Referral Type: Home Health   Referral Reason: Specialty Services Required   Requested Specialty: Home Health Services   Number of Visits Requested: 1     Activity as tolerated       Significant Diagnostic Studies: Labs:   BMP:   Recent Labs   Lab 07/22/22  0521 07/23/22  0532   GLU 97 100    138   K 5.0 5.1    110   CO2 25 23   BUN 50* 46*   CREATININE 2.3* 2.0*   CALCIUM 8.1* 8.1*   MG 1.9 1.9   , CMP   Recent Labs   Lab 07/22/22  0521 07/23/22  0532    138   K 5.0 5.1    110   CO2 25 23   GLU 97 100   BUN 50* 46*   CREATININE 2.3* 2.0*   CALCIUM 8.1* 8.1*   ANIONGAP 7* 5*   ESTGFRAFRICA 20.6* 24.4*   EGFRNONAA 17.9* 21.2*   , CBC   Recent Labs   Lab 07/22/22  0521 07/23/22  0532   WBC 6.14 5.69   HGB 9.0* 9.1*   HCT 28.3* 28.4*   * 145*    and All labs within the past 24 hours have been reviewed  Microbiology:   Blood Culture   Lab Results   Component Value Date    LABBLOO No growth after 5 days. 01/13/2022    and Urine Culture    Lab Results   Component Value Date    LABURIN PSEUDOMONAS AERUGINOSA  >100,000 cfu/ml   (A) 07/21/2022     Radiology: X-Ray: CXR: X-Ray Chest 1 View (CXR):  No results found for this visit on 07/20/22. and X-Ray Chest PA and Lateral (CXR): No results found for this visit on 07/20/22. and KUB: X-Ray Abdomen AP 1 View (KUB): No results found for this visit on 07/20/22.  CT scan: CT ABDOMEN PELVIS WITH CONTRAST: No results found for this visit on 07/20/22. and CT ABDOMEN PELVIS WITHOUT CONTRAST:   Results for orders placed or performed during the hospital encounter of 07/20/22   CT Abdomen Pelvis  Without Contrast    Narrative    CMS MANDATED QUALITY DATA - CT RADIATION - 436    All CT scans at this facility utilize dose modulation, iterative reconstruction, and/or weight based dosing when appropriate to reduce radiation dose to as low as reasonably achievable.    HISTORY: Abdominal bloating    COMPARISON: 3/26/2022    FINDINGS: Noncontrast axial images were obtained. Nonenhanced study is tailored for the detection of urolithiasis, and is insensitive for abnormalities of the solid organs, vasculature and hollow viscera.    CT ABDOMEN: The heart is enlarged. There is calcification of the mitral annulus.    There are mild emphysematous changes in the lung bases mild bronchiectasis. There is improvement of the atelectasis bilaterally with persistent atelectasis or scarring in the right lung base.    There is a small hiatal hernia.    The liver, spleen and pancreas have a normal noncontrast appearance. There is no biliary duct dilatation. The gallbladder is absent.    The adrenal glands are normal.    There are no thick-walled or dilated bowel loops. There is a moderate amount fecal material in the right colon.    There is no free fluid. There is no mesenteric or retroperitoneal adenopathy. There is a stable 4.8 x 4.7 cm infrarenal abdominal aortic aneurysm. There is diffuse vascular calcification.    The musculature is normal.    There is multilevel degenerative changes of the lumbar spine.    CT PELVIS: There is sigmoid diverticulosis. The bladder is distended. The uterus is  absent.    There is a total right hip arthroplasty. There are three threaded screws within the left hip.    IMPRESSION: Moderate amount of fecal material within the right colon compatible with constipation. There is no evidence of obstruction    Stable 4.8 cm infrarenal abdominal aortic aneurysm with diffuse vascular calcification    Prior cholecystectomy and hysterectomy.        Small hiatal hernia    Stable atelectasis in the right lung base    Electronically signed by:  Jonna Lyn MD  7/20/2022 3:59 PM CDT Workstation: [id number]       Pending Diagnostic Studies:     None         Medications:  Reconciled Home Medications:      Medication List      START taking these medications    levoFLOXacin 500 MG tablet  Commonly known as: LEVAQUIN  Take 1 tablet (500 mg total) by mouth every other day. for 14 days        CHANGE how you take these medications    albuterol 2.5 mg /3 mL (0.083 %) nebulizer solution  Commonly known as: PROVENTIL  USE 1 VIAL IN NEBULIZER EVERY 6 HOURS AS NEEDED FOR WHEEZING. RESCUE  What changed:   · how much to take  · how to take this  · when to take this  · reasons to take this     levothyroxine 88 MCG tablet  Commonly known as: SYNTHROID  Take 1 tablet by mouth once daily  What changed: when to take this     polyethylene glycol 17 gram/dose powder  Commonly known as: GLYCOLAX  Take 17 g by mouth 2 (two) times daily.  What changed: when to take this        CONTINUE taking these medications    acetaminophen 325 MG tablet  Commonly known as: TYLENOL  Take 325 mg by mouth every 6 (six) hours as needed for Pain.     allopurinoL 100 MG tablet  Commonly known as: ZYLOPRIM  Take 100 mg by mouth once daily.     ascorbic acid (vitamin C) 500 MG tablet  Commonly known as: VITAMIN C  Take 500 mg by mouth once daily.     calcium carbonate 500 mg calcium (1,250 mg) tablet  Commonly known as: OS-TASIA  Take 1 tablet by mouth 2 (two) times daily.     ciclopirox 8 % Soln  Commonly known as: PENLAC  Apply  topically nightly.     cloNIDine 0.1 MG tablet  Commonly known as: CATAPRES  Take 1 tablet (0.1 mg total) by mouth 3 (three) times daily.     diltiaZEM 180 MG 24 hr capsule  Commonly known as: CARDIZEM CD  Take 1 capsule (180 mg total) by mouth once daily.     ferrous sulfate 325 mg (65 mg iron) Tab tablet  Commonly known as: FEOSOL  Take 325 mg by mouth once daily.     fish oil-omega-3 fatty acids 300-1,000 mg capsule  Take 2 g by mouth every evening.     FOLBIC 2.5-25-2 mg Tab  Generic drug: folic acid-vit B6-vit B12 2.5-25-2 mg  Take 1 tablet by mouth once daily.     mepolizumab 100 mg/mL Atin  Generic drug: mepolizumab  Inject 1 mL (100 mg total) into the skin every 28 days.     montelukast 10 mg tablet  Commonly known as: SINGULAIR  TAKE 1 TABLET BY MOUTH ONCE DAILY IN THE EVENING     multivitamin tablet  Commonly known as: THERAGRAN  Take 1 tablet by mouth once daily.     omeprazole 40 MG capsule  Commonly known as: PRILOSEC  Take 1 capsule by mouth once daily     rosuvastatin 20 MG tablet  Commonly known as: CRESTOR  Take 1 tablet (20 mg total) by mouth once daily.     sertraline 25 MG tablet  Commonly known as: ZOLOFT  Take 1 tablet by mouth once daily     simethicone 180 mg Cap  Take 1 capsule by mouth 3 (three) times daily as needed.     TRELEGY ELLIPTA 200-62.5-25 mcg inhaler  Generic drug: fluticasone-umeclidin-vilanter  Inhale 1 puff into the lungs once daily.        STOP taking these medications    ciprofloxacin HCl 500 MG tablet  Commonly known as: CIPRO     clobetasol 0.05% 0.05 % Oint  Commonly known as: TEMOVATE     diclofenac sodium 1 % Gel  Commonly known as: VOLTAREN     fluconazole 150 MG Tab  Commonly known as: DIFLUCAN     fluticasone propionate 50 mcg/actuation nasal spray  Commonly known as: FLONASE     furosemide 20 MG tablet  Commonly known as: LASIX     ketoconazole 2 % shampoo  Commonly known as: NIZORAL     magnesium oxide 400 mg (241.3 mg magnesium) tablet  Commonly known as: MAG-OX      meclizine 25 mg tablet  Commonly known as: ANTIVERT     miconazole nitrate 200 mg Supp  Commonly known as: MONISTAT 3     nitroGLYCERIN 0.4 MG SL tablet  Commonly known as: NITROSTAT     nystatin powder  Commonly known as: MYCOSTATIN     rivaroxaban 10 mg Tab  Commonly known as: XARELTO     vitamin D 1000 units Tab  Commonly known as: VITAMIN D3            Indwelling Lines/Drains at time of discharge:   Lines/Drains/Airways     None                 Time spent on the discharge of patient: 35 minutes         Cristal Car MD  Department of Hospital Medicine  [facility]

## 2022-07-25 NOTE — PT/OT/SLP DISCHARGE
Occupational Therapy Discharge Summary    Blaire Cage  MRN: [id number]   Principal Problem: Acute kidney injury superimposed on chronic kidney disease      Patient Discharged from acute Occupational Therapy on 7/23/2022.  Please refer to prior OT note dated 7/22/2022 for functional status.    Assessment:      Goals partially met.    Objective:     GOALS:   Multidisciplinary Problems     Occupational Therapy Goals     Not on file          Multidisciplinary Problems (Resolved)        Problem: Occupational Therapy    Goal Priority Disciplines Outcome Interventions   Occupational Therapy Goal   (Resolved)     OT, PT/OT Met    Description: Goals to be met by: 8/21/22    Patient will increase functional independence with ADLs by performing:    UE Dressing with Supervision.  LE Dressing with Supervision.  Grooming while standing at sink with Supervision.  Toileting from toilet with Supervision for hygiene and clothing management.   Toilet transfer to toilet with Supervision.                     Reasons for Discontinuation of Therapy Services  Transfer to alternate level of care. and Satisfactory goal achievement.      Plan:     Patient Discharged to: Home with Home Health Service    7/25/2022

## 2022-07-26 NOTE — TELEPHONE ENCOUNTER
Call for Nucala refill and to follow up on acute kidney issue post-discharge. No answer, LV.    Sebastian Barragan, PharmD  Clinical Pharmacist  [facility]  P: [phone]

## 2022-07-26 NOTE — TELEPHONE ENCOUNTER
Specialty Pharmacy - Refill Coordination    Specialty Medication Orders Linked to Encounter    Flowsheet Row Most Recent Value   Medication #1 mepolizumab 100 mg/mL AtIn (Order#[id number], Rx#[id number])          Refill Questions - Documented Responses    Flowsheet Row Most Recent Value   Patient Availability and HIPAA Verification    Does patient want to proceed with activity? Yes   HIPAA/medical authority confirmed? Yes   Relationship to patient of person spoken to? Child   Refill Screening Questions    Changes to allergies? No   Changes to medications? No   New conditions since last clinic visit? No   Unplanned office visit, urgent care, ED, or hospital admission in the last 4 weeks? No   How does patient/caregiver feel medication is working? Excellent   Financial problems or insurance changes? No   How many doses of your specialty medications were missed in the last 4 weeks? 0   Would patient like to speak to a pharmacist? No   When does the patient need to receive the medication? 07/28/22   Refill Delivery Questions    How will the patient receive the medication? Delivery Ronda   When does the patient need to receive the medication? 07/28/22   Shipping Address Home   Address in [facility] confirmed and updated if neccessary? Yes   Expected Copay ($) 0   Is the patient able to afford the medication copay? Yes   Payment Method zero copay   Days supply of Refill 28   Supplies needed? No supplies needed   Refill activity completed? Yes   Refill activity plan Refill scheduled   Shipment/Pickup Date: 07/27/22          Current Outpatient Medications   Medication Sig    acetaminophen (TYLENOL) 325 MG tablet Take 325 mg by mouth every 6 (six) hours as needed for Pain.    albuterol (PROVENTIL) 2.5 mg /3 mL (0.083 %) nebulizer solution USE 1 VIAL IN NEBULIZER EVERY 6 HOURS AS NEEDED FOR WHEEZING. RESCUE (Patient taking differently: Take 2.5 mg by nebulization every 6 (six) hours as needed for Wheezing or Shortness  of Breath. USE 1 VIAL IN NEBULIZER EVERY 6 HOURS AS NEEDED FOR WHEEZING. RESCUE)    allopurinoL (ZYLOPRIM) 100 MG tablet Take 100 mg by mouth once daily.    ascorbic acid, vitamin C, (VITAMIN C) 500 MG tablet Take 500 mg by mouth once daily.    calcium carbonate (OS-TASIA) 500 mg calcium (1,250 mg) tablet Take 1 tablet by mouth 2 (two) times daily.    ciclopirox (PENLAC) 8 % Soln Apply topically nightly.    cloNIDine (CATAPRES) 0.1 MG tablet Take 1 tablet (0.1 mg total) by mouth 3 (three) times daily.    diltiaZEM (CARDIZEM CD) 180 MG 24 hr capsule Take 1 capsule (180 mg total) by mouth once daily.    ferrous sulfate (FEOSOL) 325 mg (65 mg iron) Tab tablet Take 325 mg by mouth once daily.    fish oil-omega-3 fatty acids 300-1,000 mg capsule Take 2 g by mouth every evening.    fluticasone-umeclidin-vilanter (TRELEGY ELLIPTA) 200-62.5-25 mcg inhaler Inhale 1 puff into the lungs once daily.    folic acid-vit B6-vit B12 2.5-25-2 mg (FOLBIC) 2.5-25-2 mg Tab Take 1 tablet by mouth once daily.    levoFLOXacin (LEVAQUIN) 500 MG tablet Take 1 tablet (500 mg total) by mouth every other day. for 14 days    levothyroxine (SYNTHROID) 88 MCG tablet Take 1 tablet by mouth once daily (Patient taking differently: Take 88 mcg by mouth before breakfast.)    mepolizumab 100 mg/mL AtIn Inject 1 mL (100 mg total) into the skin every 28 days.    montelukast (SINGULAIR) 10 mg tablet TAKE 1 TABLET BY MOUTH ONCE DAILY IN THE EVENING (Patient taking differently: Take 10 mg by mouth every evening.)    multivitamin (THERAGRAN) tablet Take 1 tablet by mouth once daily.    omeprazole (PRILOSEC) 40 MG capsule Take 1 capsule by mouth once daily (Patient taking differently: Take 40 mg by mouth once daily.)    polyethylene glycol (GLYCOLAX) 17 gram/dose powder Take 17 g by mouth 2 (two) times daily.    rosuvastatin (CRESTOR) 20 MG tablet Take 1 tablet (20 mg total) by mouth once daily.    sertraline (ZOLOFT) 25 MG tablet Take 1  tablet by mouth once daily (Patient taking differently: Take 25 mg by mouth once daily.)    simethicone 180 mg Cap Take 1 capsule by mouth 3 (three) times daily as needed.   Last reviewed on 7/20/2022 10:35 PM by Nicol Altamirano, RN    Review of patient's allergies indicates:   Allergen Reactions    Carvedilol Other (See Comments)     Bradycardia and syncope    Boniva [ibandronate]     Codeine     Hydralazine analogues     Iodinated contrast media Hives    Kionex [sodium polystyrene sulfonate] Diarrhea     From encounter 12/11/17 for diarrhea--not true allergy.    Lisinopril     Morphine     Last reviewed on  7/20/2022 10:32 PM by Nicol Altamirano      Tasks added this encounter   8/18/2022 - Refill Call (Auto Added)   Tasks due within next 3 months   No tasks due.     Sebastian Barragan, PharmD  [facility]  [address] [place] [id number]  Phone: [phone]  Fax: [phone]

## 2022-07-28 NOTE — PLAN OF CARE
Problem: Fatigue  Goal: Improved Activity Tolerance  Outcome: Ongoing, Progressing  Intervention: Promote Improved Energy  Flowsheets (Taken 7/28/2022 2547)  Fatigue Management: frequent rest breaks encouraged  Sleep/Rest Enhancement:   regular sleep/rest pattern promoted   relaxation techniques promoted  Activity Management: Ambulated -L4

## 2022-07-28 NOTE — NURSING
1500 Patient here for Retacrit injection.  SBP 170s-180s over DBP 60s after repeating multiple time. Notified MD Jo's office and spoke with Adelina who will discuss with MD and call back RN.     1515 patient daughter requesting to return tomorrow in AM and she will ensure patient BP is lower. Patient has clonidine prn to take at home. Rescheduled for 0840 tomorrow.

## 2022-07-29 NOTE — PLAN OF CARE
Problem: Fatigue  Goal: Improved Activity Tolerance  Outcome: Ongoing, Progressing  Intervention: Promote Improved Energy  Flowsheets (Taken 7/29/2022 5860)  Fatigue Management: frequent rest breaks encouraged  Sleep/Rest Enhancement:   regular sleep/rest pattern promoted   relaxation techniques promoted  Activity Management: Ambulated -L4

## 2022-07-29 NOTE — PROGRESS NOTES
HPI     Diabetic Eye Exam      Additional comments: LDE: 03/22/2021              Comments     89 YO female presents today for an annual diabetic eye exam. Patient   states that she is having trouble with vision OD at a distance.     H/O CE w/IOL OU           Last edited by Pallavi Uribe, PCT on 7/29/2022  9:26 AM. (History)            Assessment /Plan     For exam results, see Encounter Report.    Diabetes mellitus type 2 without retinopathy    Pseudophakia    Refractive error    Dry eye syndrome, bilateral    Choroidal nevus of left eye      1. Diabetes mellitus type 2 without retinopathy  No retinopathy, no CSME OU. Discussed possible ocular affects of uncontrolled blood sugar with patient. Recommended continued strong blood sugar control and continued care with PCP. Monitor yearly.     2. Pseudophakia  S/p cataract extraction, no PCO    3. Refractive error  No improvement with refraction, will treat dry eye syndrome first  Discussed OON Outside.in, pt will go in-network prn for updated specs    4. Dry eye syndrome, bilateral  Discussed ocular affects of dry eyes. Recommend OTC artificial tears 2-4 times a day in both eyes. Discussed chronicity of SOSA. RTC if symptoms not alleviated by continued use of artificial tears.     5. Choroidal nevus of left eye  Stable from previous note, flat, distinct borders, no overlying pigment. Monitor yearly.       RTC in 1 year for comprehensive eye exam, or sooner prn.

## 2022-08-01 NOTE — TELEPHONE ENCOUNTER
----- Message from Kashmir Barragan sent at 8/1/2022 10:11 AM CDT -----  Type:  Sooner Appointment Request    Caller is requesting a sooner appointment.  Caller declined first available appointment listed below.  Caller will not accept being placed on the waitlist and is requesting a message be sent to doctor.    Name of Caller:  Daughter  When is the first available appointment?  8/10/22  Symptoms:    Best Call Back Number:  [phone] or [phone]  Additional Information:

## 2022-08-01 NOTE — TELEPHONE ENCOUNTER
Called and spoke to patient daughter regarding sooner appointment with Dr. Edmonds. Appointment scheduled for tomorrow 8/2/22

## 2022-08-02 NOTE — TELEPHONE ENCOUNTER
----- Message from Abdirahman Smiley MA sent at 8/2/2022  4:18 PM CDT -----  Contact: [facility]  Please note referral in system for Baker cyst, right [M71.21] from patients PCP.      Was not sure this was something Dr. Sanchez would treat.    Thanks,  Abdirahmna

## 2022-08-02 NOTE — PROGRESS NOTES
Subjective:       Patient ID: Blaire Cage is a 89 y.o. female.    Chief Complaint: Transitional Care (F/U [facility])    HPI  Review of Systems   Constitutional: Negative for fatigue and unexpected weight change.   Respiratory: Negative for chest tightness and shortness of breath.    Cardiovascular: Negative for chest pain, palpitations and leg swelling.   Gastrointestinal: Negative for abdominal pain.   Musculoskeletal: Negative for arthralgias.   Neurological: Negative for dizziness, syncope, light-headedness and headaches.       Patient Active Problem List   Diagnosis    Diabetic neuropathy, type II diabetes mellitus    CKD (chronic kidney disease), stage III, eGFR 39, progressive 31    Hypothyroidism with abnormal thyroid function test    Hypertension associated with diabetes    Hyperlipidemia associated with type 2 diabetes mellitus    Type 2 diabetes mellitus with stage 4 chronic kidney disease    Right carotid bruit    Anxiety    UTI (urinary tract infection)    Abdominal aortic aneurysm without rupture    Hip arthritis    Degenerative spinal arthritis    Osteoporosis    Left ventricular diastolic dysfunction with preserved systolic function    Hypertensive left ventricular hypertrophy, without heart failure    Smoker, quit 5/2016, 25 pck-years    Abdominal obesity    Vasovagal syncope    Body mass index (BMI) 23.0-23.9, adult, today 24.1    Iron deficiency anemia due to chronic blood loss    Proteinuria due to type 2 diabetes mellitus    History of syncope in childhood    Prerenal azotemia    Drug-induced constipation    COPD exacerbation    Asthmatic bronchitis with acute exacerbation    Controlled type 2 diabetes mellitus, without long-term current use of insulin    Asthma-COPD overlap syndrome    Eosinophilic asthma    Acute kidney injury superimposed on chronic kidney disease 3    Moderate malnutrition    Type 2 diabetes mellitus with stage 4 chronic kidney disease, without  long-term current use of insulin    Chronic anticoagulation    Constipation    DVT (deep venous thrombosis)    History of pulmonary embolism    Anemia due to multiple mechanisms    Chronic anemia    Normocytic normochromic anemia    Anemia, chronic renal failure, stage 3 (moderate)    Homocysteinemia    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    Diastolic CHF, chronic    Anemia of chronic disease    Kidney stones    Macrocytic anemia    Orthostatic hypotension    Closed left hip fracture    Hip pain, left    Chronic respiratory failure with hypoxia    Nonrheumatic aortic valve stenosis    Mitral stenosis    Hypoalbuminemia    Hypoglycemia    CKD (chronic kidney disease), stage IV    Vertigo    Left foot pain    Posterior tibial tendon dysfunction, left    Arthritis    Pulmonary hypertension    Chronic diastolic heart failure    Status post placement of implantable loop recorder    Iron deficiency anemia, unspecified    Anemia due to stage 3 chronic kidney disease treated with erythropoietin    Fall at home    Personal history of thromboembolic disease    GAVE (gastric antral vascular ectasia)    Cellulitis of right lower extremity    Hypertensive urgency    Secondary hyperparathyroidism of renal origin    Other thrombophilia    Angina pectoris, unspecified     Patient is here for a chronic conditions follow up.   Here with daughter. Giving clonidine .1mg when BP > 160.  Up to tid.  Has [facility] 7/20/22 -abd bloating  She had an appointment to see her physician today but missed the appointment because she felt too weak to ambulate. The patient had seen Dr. Gramajo recently and was told that some of her bowel problems may be related to the use of iron supplementation.  She was advised to discontinue iron tablets and was started on Gas-X lactulose and MiraLax.  Currently she denies any difficulty urinating. Daughter reports the patient has good appetite but she has to be  encouraged to drink water.   Patient denies fever, chills, chest pain increased sob, nausea, vomiting, abdominal pain, dysuria, hematuria, diarrhea, melena , numbness, tingling or LOC.  In the ED patient is afebrile. She is hypertensive. CBC with chronic anemia. H/H appears to be at patient's baseline 10/32. CMP with potassium 5.5, bun 49, creat 2.3. CT abdomen shows constipation. Stable 4.8cm infrarenal AAA with diffuse vascular calcification. The patient is treated with meds to shift Potassium in the ED. Hospital Course:   7/21: Patient is doing well and states she has not moved her bowels in 4 day. One time dose of Lactulose. No concerns/issues overnight reported by the patient or the nursing staff.   7/22:  Patient still having difficulty moving bowel, will start patient on lactulose t.i.d..  Urine cultures growing Pseudomonas will start patient on IV Zosyn.   7/23:  Patient doing significantly better has moved her bowel.  Patient counseled on diet, and subsequently discharged on oral antibiotics for UTI.     ED visit 7/29/22 neg dvt       Reviewed labs 7/2022  Type 2 DM A1c 5.7 4/22  Eye Dr. Ortiz appt 7/29/22    GYN Dr. Tam        History:   Admitted [facility] 1/13/2022 COPD exacerbation with acute on chronic resp failure.  Responded to steroids and abx, 2l NC O2.  Discharged on supplemental oxygen, Doxycycline 100mg bid x 10 days and prednisone. Had coag neg staph in 1 blood culture 1/13/2022 -likely contaminant     Admitted [facility] 10/21/2021 for acute on chronic CHF     Admitted 9/24/2021 for possible melena with h/o GI bleed from gastric antral vascular ectasia on chronic anticoag xarelto due to hypercoag. Under care of GI Dr. Gramajo. Found to be COVID + (WAS FULLY VACCINATED).  Stool occult blood was negative. Dark stools presumed related to iron supplements. Was given remdesivir and oxygen supplementation continued (as at home).  No unstable H/H     Admitted 7/9/2021 [facility] for right hip fracture after fall at  home        Ortho Dr. Escoto right hip fracture 7/2021 periprosthetic     Urology NP Dr. Haile/ [place] recurrent uti, urinary retention requriing carter placement 7/2021. flomax added     Admitted [facility] 10/29/20 UGI and had bleeding  angioectasias in stomach and duodenum     Admitted [facility] 5/7/20 for dizziness x 1 month. Neuro consulted and no neuro or central cause of dizziness found. Recommended f/u ENT.  Radiology:   MRI brain 05/08/2020:  1. No evidence of an acute intracranial abnormality.  2. Moderate generalized cerebral volume loss and chronic ischemic changes, as above.  MRA brain 05/08/2020: No high-grade stenosis, large vessel occlusion, or aneurysm.  Carotid ultrasound 05/08/2020: No evidence of a hemodynamically significant carotid bifurcation stenosis.  There is moderate calcified atherosclerotic plaque seen in both carotid bifurcations.     Card Dr. Herbert/now Sudhakar AAA, aortic stenosis, chf     Heme/Onc Dr. Alvarez iron def anemia, h/o DVT and PE with MTHFR-C heterozygous + on eliquis     Pulm Dr. Christopher/Facundo- asthma -COPD     GI Dr. Flores-      Nephro Dr. Jo -CKD stage 3 b, anemia of chronic diz, hyperuricemia  Objective:      Physical Exam  Vitals and nursing note reviewed.   Constitutional:       Appearance: She is well-developed.   Cardiovascular:      Rate and Rhythm: Normal rate and regular rhythm.      Heart sounds: Normal heart sounds.   Pulmonary:      Effort: Pulmonary effort is normal.      Breath sounds: Normal breath sounds.   Musculoskeletal:      Right knee: Decreased range of motion. Tenderness present.        Legs:       Comments: Using walker for balance   Skin:     General: Skin is warm and dry.   Neurological:      Mental Status: She is alert and oriented to person, place, and time.         Assessment:       1. Baker cyst, right    2. Hypertension associated with diabetes    3. Urinary tract infection without hematuria, site unspecified        Plan:         1. Baker cyst,  right  Reice. Refer for eval and treat. Made benefit from aspiration and steroid injection as improve sx before  - Ambulatory referral/consult to Physical Medicine Rehab; Future    2. Hypertension associated with diabetes  Erratic.  Hold clonidine. Enroll in and monitor BP  - Hypertension Digital Medicine (HDMP) Enrollment Order  - Hypertension Digital Medicine ([facility]): Assign Onboarding Questionnaires  Adjust meds based on average    3. Urinary tract infection without hematuria, site unspecified  Resolved. Monitor for worsening symptoms and return to clinic or go to the ER if these occur: fever > 100.4, severe abdominal pain, intractable vomiting, bleeding from the rectum or black tarry stools, dehydration, lethargy or other worsening symptoms.'          Time spent with patient: 20 minutes    Patient with be reevaluated in as scheduled or sooner prn    Greater than 50% of this visit was spent counseling as described in above documentation:Yes

## 2022-08-10 NOTE — PROGRESS NOTES
Subjective:       Patient ID: Blaire Cage is a 89 y.o. female.    Chief Complaint: Follow-up (3mth f/u )    HPI  Review of Systems   Constitutional: Negative for fatigue and unexpected weight change.   Respiratory: Negative for chest tightness and shortness of breath.    Cardiovascular: Negative for chest pain, palpitations and leg swelling.   Gastrointestinal: Negative for abdominal pain.   Musculoskeletal: Negative for arthralgias.   Neurological: Negative for dizziness, syncope, light-headedness and headaches.       Patient Active Problem List   Diagnosis    Diabetic neuropathy, type II diabetes mellitus    CKD (chronic kidney disease), stage III, eGFR 39, progressive 31    Hypothyroidism with abnormal thyroid function test    Hypertension associated with diabetes    Hyperlipidemia associated with type 2 diabetes mellitus    Type 2 diabetes mellitus with stage 4 chronic kidney disease    Right carotid bruit    Anxiety    UTI (urinary tract infection)    Abdominal aortic aneurysm without rupture    Hip arthritis    Degenerative spinal arthritis    Osteoporosis    Left ventricular diastolic dysfunction with preserved systolic function    Hypertensive left ventricular hypertrophy, without heart failure    Smoker, quit 5/2016, 25 pck-years    Abdominal obesity    Vasovagal syncope    Body mass index (BMI) 23.0-23.9, adult, today 24.1    Iron deficiency anemia due to chronic blood loss    Proteinuria due to type 2 diabetes mellitus    History of syncope in childhood    Prerenal azotemia    Drug-induced constipation    COPD exacerbation    Asthmatic bronchitis with acute exacerbation    Controlled type 2 diabetes mellitus, without long-term current use of insulin    Asthma-COPD overlap syndrome    Eosinophilic asthma    Acute kidney injury superimposed on chronic kidney disease 3    Moderate malnutrition    Type 2 diabetes mellitus with stage 4 chronic kidney disease, without  long-term current use of insulin    Chronic anticoagulation    Constipation    DVT (deep venous thrombosis)    History of pulmonary embolism    Anemia due to multiple mechanisms    Chronic anemia    Normocytic normochromic anemia    Anemia, chronic renal failure, stage 3 (moderate)    Homocysteinemia    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    Diastolic CHF, chronic    Anemia of chronic disease    Kidney stones    Macrocytic anemia    Orthostatic hypotension    Closed left hip fracture    Hip pain, left    Chronic respiratory failure with hypoxia    Nonrheumatic aortic valve stenosis    Mitral stenosis    Hypoalbuminemia    Hypoglycemia    CKD (chronic kidney disease), stage IV    Vertigo    Left foot pain    Posterior tibial tendon dysfunction, left    Arthritis    Pulmonary hypertension    Chronic diastolic heart failure    Status post placement of implantable loop recorder    Iron deficiency anemia, unspecified    Anemia due to stage 3 chronic kidney disease treated with erythropoietin    Fall at home    Personal history of thromboembolic disease    GAVE (gastric antral vascular ectasia)    Cellulitis of right lower extremity    Hypertensive urgency    Secondary hyperparathyroidism of renal origin    Other thrombophilia    Angina pectoris, unspecified     Patient is here for a chronic conditions follow up.    Here with daughter. Giving clonidine .1mg when BP > 160.  Up to tid.  Has [facility]    CBC 8/9/22 showed improving anemia on erythropoetin injections    Had right knee pain saumya posterior thought related to recurrent bakers cyst. Referred to ortho and has appt Dr. Sanchez 8/25/22    Admitted 7/20/22 for bloating. Had constipation and uti.  On oral iron h/o gi bleed    Reviewed labs 7/2022  Type 2 DM A1c 5.7 4/22  Eye Dr. Ortiz appt 7/29/22     GYN Dr. Tam         History:   Admitted [facility] 1/13/2022 COPD exacerbation with acute on chronic resp failure.  Responded to steroids  and abx, 2l NC O2.  Discharged on supplemental oxygen, Doxycycline 100mg bid x 10 days and prednisone. Had coag neg staph in 1 blood culture 1/13/2022 -likely contaminant     Admitted [facility] 10/21/2021 for acute on chronic CHF     Admitted 9/24/2021 for possible melena with h/o GI bleed from gastric antral vascular ectasia on chronic anticoag xarelto due to hypercoag. Under care of GI Dr. Gramajo. Found to be COVID + (WAS FULLY VACCINATED).  Stool occult blood was negative. Dark stools presumed related to iron supplements. Was given remdesivir and oxygen supplementation continued (as at home).  No unstable H/H     Admitted 7/9/2021 [facility] for right hip fracture after fall at home        Ortho Dr. Escoto right hip fracture 7/2021 periprosthetic     Urology NP Dr. Haile/ JHONY Gupta recurrent uti, urinary retention requriing carter placement 7/2021. flomax added     Admitted [facility] 10/29/20 UGI and had bleeding  angioectasias in stomach and duodenum     Admitted [facility] 5/7/20 for dizziness x 1 month. Neuro consulted and no neuro or central cause of dizziness found. Recommended f/u ENT.  Radiology:   MRI brain 05/08/2020:  1. No evidence of an acute intracranial abnormality.  2. Moderate generalized cerebral volume loss and chronic ischemic changes, as above.  MRA brain 05/08/2020: No high-grade stenosis, large vessel occlusion, or aneurysm.  Carotid ultrasound 05/08/2020: No evidence of a hemodynamically significant carotid bifurcation stenosis.  There is moderate calcified atherosclerotic plaque seen in both carotid bifurcations.     Paula Herbert/now Sudhakar AAA, aortic stenosis, chf     Heme/Onc Dr. Alvarez iron def anemia, h/o DVT and PE with MTHFR-C heterozygous + on eliquis     Pulm Dr. Christopher/Facundo- asthma -COPD     GI Dr. Flores-      Nephro Dr. Jo -CKD stage 3 b, anemia of chronic diz, hyperuricemia  Objective:      Physical Exam  Vitals and nursing note reviewed.   Constitutional:       Appearance: She is  well-developed.   Cardiovascular:      Rate and Rhythm: Normal rate and regular rhythm.      Heart sounds: Normal heart sounds.   Pulmonary:      Effort: Pulmonary effort is normal.      Breath sounds: Normal breath sounds.   Skin:     General: Skin is warm and dry.   Neurological:      Mental Status: She is alert and oriented to person, place, and time.         Assessment:       1. Hypertension associated with diabetes    2. Epigastric pain    3. Asthma-COPD overlap syndrome    4. Prerenal azotemia    5. Type 2 diabetes mellitus with stage 4 chronic kidney disease, without long-term current use of insulin    6. Baker cyst, right    7. Stage 3 chronic kidney disease, unspecified whether stage 3a or 3b CKD    8. Hypothyroidism, unspecified type    9. Hyperlipidemia associated with type 2 diabetes mellitus    10. Hyperuricemia    11. Vitamin D deficiency        Plan:         1. Epigastric pain  continue  - omeprazole (PRILOSEC) 40 MG capsule; Take 1 capsule (40 mg total) by mouth once daily.  Dispense: 90 capsule; Refill: 3    2. Asthma-COPD overlap syndrome  Cont current regimen  - albuterol (PROVENTIL) 2.5 mg /3 mL (0.083 %) nebulizer solution; Take 3 mLs (2.5 mg total) by nebulization every 6 (six) hours as needed for Wheezing or Shortness of Breath. USE 1 VIAL IN NEBULIZER EVERY 6 HOURS AS NEEDED FOR WHEEZING. RESCUE  Dispense: 360 each; Refill: 3    3. Prerenal azotemia  Stable and chronic.  Will continue to monitor q3-6 months and control chronic conditions as optimally as possible to preserve function.  Cont prn  - furosemide (LASIX) 20 MG tablet; Take 1 tablet only as needed, for swelling, increase SOB, weight gain of 3 lbs overnight or 5 lbs for the week  Dispense: 30 tablet; Refill: prn    4. Type 2 diabetes mellitus with stage 4 chronic kidney disease, without long-term current use of insulin  Stable condition.  Continue current medications.  Will adjust based on lab findings or if condition changes.    -  furosemide (LASIX) 20 MG tablet; Take 1 tablet only as needed, for swelling, increase SOB, weight gain of 3 lbs overnight or 5 lbs for the week  Dispense: 30 tablet; Refill: prn  - Hemoglobin A1C; Future    5. Hypertension associated with diabetes  Uncontrolled.  Add  - Comprehensive Metabolic Panel; Future  - losartan (COZAAR) 25 MG tablet; Take 1 tablet (25 mg total) by mouth once daily.  Dispense: 90 tablet; Refill: 3    6. Desouza cyst, right  F/u pm & r as planned    7. Stage 3 chronic kidney disease, unspecified whether stage 3a or 3b CKD  Cont neprho consult and mgmt    8. Hypothyroidism, unspecified type  Stable condition.  Continue current medications.  Will adjust based on lab findings or if condition changes.    - levothyroxine (SYNTHROID) 88 MCG tablet; Take 1 tablet (88 mcg total) by mouth once daily.  Dispense: 90 tablet; Refill: 3  - TSH; Future  - T4, Free; Future    9. Hyperlipidemia associated with type 2 diabetes mellitus  Stable condition.  Continue current medications.  Will adjust based on lab findings or if condition changes.    - rosuvastatin (CRESTOR) 20 MG tablet; Take 1 tablet (20 mg total) by mouth once daily.  Dispense: 90 tablet; Refill: 3  - Lipid Panel; Future    10. Hyperuricemia  Stable condition.  Continue current medications.  Will adjust based on lab findings or if condition changes.    - allopurinoL (ZYLOPRIM) 100 MG tablet; Take 1 tablet (100 mg total) by mouth once daily.  Dispense: 90 tablet; Refill: 3  - Uric Acid; Future    11. Vitamin D deficiency  Screen and treat as indicated:    - Vitamin D; Future        Time spent with patient: 20 minutes    Patient with be reevaluated in 3 months or sooner prn    Greater than 50% of this visit was spent counseling as described in above documentation:Yes

## 2022-08-18 NOTE — TELEPHONE ENCOUNTER
Specialty Pharmacy - Refill Coordination    Specialty Medication Orders Linked to Encounter    Flowsheet Row Most Recent Value   Medication #1 mepolizumab 100 mg/mL AtIn (Order#[id number], Rx#[id number])          Refill Questions - Documented Responses    Flowsheet Row Most Recent Value   Patient Availability and HIPAA Verification    Does patient want to proceed with activity? Yes   HIPAA/medical authority confirmed? Yes   Relationship to patient of person spoken to? Self   Refill Screening Questions    Changes to allergies? No   Changes to medications? No   New conditions since last clinic visit? No   Unplanned office visit, urgent care, ED, or hospital admission in the last 4 weeks? No   How does patient/caregiver feel medication is working? Excellent   Financial problems or insurance changes? No   How many doses of your specialty medications were missed in the last 4 weeks? 0   Would patient like to speak to a pharmacist? No   When does the patient need to receive the medication? 08/25/22   Refill Delivery Questions    How will the patient receive the medication? Delivery Ronda   When does the patient need to receive the medication? 08/25/22   Shipping Address Home   Address in [facility] confirmed and updated if neccessary? Yes   Expected Copay ($) 0   Is the patient able to afford the medication copay? Yes   Payment Method zero copay   Days supply of Refill 28   Supplies needed? No supplies needed   Refill activity completed? Yes   Refill activity plan Refill scheduled   Shipment/Pickup Date: 08/23/22          Current Outpatient Medications   Medication Sig    acetaminophen (TYLENOL) 325 MG tablet Take 325 mg by mouth every 6 (six) hours as needed for Pain.    albuterol (PROVENTIL) 2.5 mg /3 mL (0.083 %) nebulizer solution Take 3 mLs (2.5 mg total) by nebulization every 6 (six) hours as needed for Wheezing or Shortness of Breath. USE 1 VIAL IN NEBULIZER EVERY 6 HOURS AS NEEDED FOR WHEEZING. RESCUE     allopurinoL (ZYLOPRIM) 100 MG tablet Take 1 tablet (100 mg total) by mouth once daily.    ascorbic acid, vitamin C, (VITAMIN C) 500 MG tablet Take 500 mg by mouth once daily.    calcium carbonate (OS-TASIA) 500 mg calcium (1,250 mg) tablet Take 1 tablet by mouth 2 (two) times daily.    ciclopirox (PENLAC) 8 % Soln Apply topically nightly.    diltiaZEM (CARDIZEM CD) 180 MG 24 hr capsule Take 1 capsule (180 mg total) by mouth once daily.    ferrous sulfate (FEOSOL) 325 mg (65 mg iron) Tab tablet Take 325 mg by mouth once daily.    fish oil-omega-3 fatty acids 300-1,000 mg capsule Take 2 g by mouth every evening.    fluticasone-umeclidin-vilanter (TRELEGY ELLIPTA) 200-62.5-25 mcg inhaler Inhale 1 puff into the lungs once daily.    folic acid-vit B6-vit B12 2.5-25-2 mg (FOLBIC) 2.5-25-2 mg Tab Take 1 tablet by mouth once daily.    furosemide (LASIX) 20 MG tablet Take 1 tablet only as needed, for swelling, increase SOB, weight gain of 3 lbs overnight or 5 lbs for the week    levothyroxine (SYNTHROID) 88 MCG tablet Take 1 tablet (88 mcg total) by mouth once daily.    losartan (COZAAR) 25 MG tablet Take 1 tablet (25 mg total) by mouth once daily.    mepolizumab 100 mg/mL AtIn Inject 1 mL (100 mg total) into the skin every 28 days.    montelukast (SINGULAIR) 10 mg tablet TAKE 1 TABLET BY MOUTH ONCE DAILY IN THE EVENING (Patient taking differently: Take 10 mg by mouth every evening.)    multivitamin (THERAGRAN) tablet Take 1 tablet by mouth once daily.    omeprazole (PRILOSEC) 40 MG capsule Take 1 capsule (40 mg total) by mouth once daily.    polyethylene glycol (GLYCOLAX) 17 gram/dose powder Take 17 g by mouth 2 (two) times daily.    rosuvastatin (CRESTOR) 20 MG tablet Take 1 tablet (20 mg total) by mouth once daily.    sertraline (ZOLOFT) 25 MG tablet Take 1 tablet by mouth once daily (Patient taking differently: Take 25 mg by mouth once daily.)    simethicone 180 mg Cap Take 1 capsule by mouth 3 (three)  times daily as needed.   Last reviewed on 8/10/2022  7:25 PM by Eli Edmonds MD    Review of patient's allergies indicates:   Allergen Reactions    Carvedilol Other (See Comments)     Bradycardia and syncope    Boniva [ibandronate]     Codeine     Hydralazine analogues     Iodinated contrast media Hives    Kionex [sodium polystyrene sulfonate] Diarrhea     From encounter 12/11/17 for diarrhea--not true allergy.    Lisinopril     Morphine     Last reviewed on  8/10/2022 7:25 PM by Eli Edmonds      Tasks added this encounter   9/15/2022 - Refill Call (Auto Added)   Tasks due within next 3 months   No tasks due.     Sebastian Barragan, PharmD  [facility]  [address] [place] [id number]  Phone: [phone]  Fax: [phone]

## 2022-08-25 NOTE — PROGRESS NOTES
[facility]    Consulting Provider: Dr. Eli Edmonds    CHIEF COMPLAINT:   Chief Complaint   Patient presents with    Knee Pain     Right Knee; cyst aspiration before per patient     HISTORY OF PRESENT ILLNESS: Blaire Cage is a 89 y.o. female who presents to me for the 1st time for evaluation and treatment of right knee pain.  She had a Baker cyst drained from the right knee about 6 months ago with good relief.  She feels there was recurrence of swelling of the posterior knee with some pain in the calf as well.  She notes that over the past week or so she has experienced gradual improvement he no longer has any significant discomfort of the knee or leg.    Review of Systems   Constitutional: Negative for fever.   HENT: Negative for drooling.    Eyes: Negative for discharge.   Respiratory: Negative for choking.    Cardiovascular: Negative for chest pain.   Genitourinary: Negative for flank pain.   Skin: Negative for wound.   Allergic/Immunologic: Negative for immunocompromised state.   Neurological: Negative for tremors and syncope.   Psychiatric/Behavioral: Negative for behavioral problems.     Past Medical History:   Past Medical History:   Diagnosis Date    Anemia due to multiple mechanisms 6/29/2018    Anemia, chronic disease 6/29/2018    Anemia, chronic renal failure, stage 3 (moderate) 6/29/2018    Anticoagulant long-term use     aspirin    Aortic aneurysm     Chest pain     CHF (congestive heart failure)     COPD (chronic obstructive pulmonary disease)     COPD with acute exacerbation 1/9/2015    Depression     Diabetes mellitus type II     no longer on any DM meds    DVT (deep venous thrombosis) 06/09/2018    Encounter for blood transfusion     GERD (gastroesophageal reflux disease)     GI bleed     Gout     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 8/7/2018    Hip arthritis 3/1/2016    Homocysteinemia 8/7/2018    Hyperlipidemia     Hypertension     Incontinent of  urine     Normocytic normochromic anemia 6/29/2018    Oxygen dependent     use oxygen as needed    PE (pulmonary thromboembolism) 06/09/2018    Pneumonia of right lower lobe due to infectious organism 9/11/2017    Syncope     Thyroid disease     hypothyroid    Type 2 diabetes mellitus with stage 3 chronic kidney disease 1/18/2013    UTI (urinary tract infection)        Past Surgical History:   Past Surgical History:   Procedure Laterality Date    APPENDECTOMY      CHOLECYSTECTOMY      COLONOSCOPY Left 10/29/2020    Procedure: COLONOSCOPY;  Surgeon: Singh Kee III, MD;  Location: [facility];  Service: Endoscopy;  Laterality: Left;    ESOPHAGOGASTRODUODENOSCOPY Left 10/26/2020    Procedure: EGD (ESOPHAGOGASTRODUODENOSCOPY);  Surgeon: Kareem Gramajo MD;  Location: [facility];  Service: Endoscopy;  Laterality: Left;    ESOPHAGOGASTRODUODENOSCOPY Left 10/28/2020    Procedure: EGD (ESOPHAGOGASTRODUODENOSCOPY);  Surgeon: Kareem Gramajo MD;  Location: [facility];  Service: Endoscopy;  Laterality: Left;    ESOPHAGOGASTRODUODENOSCOPY N/A 9/16/2021    Procedure: EGD (ESOPHAGOGASTRODUODENOSCOPY);  Surgeon: Kareem Gramajo MD;  Location: [facility];  Service: Endoscopy;  Laterality: N/A;    FRACTURE SURGERY      right hip     HERNIA REPAIR      groin    HYSTERECTOMY      INSERTION OF IMPLANTABLE LOOP RECORDER N/A 12/12/2018    Procedure: Insertion, Implantable Loop Recorder;  Surgeon: Ashok Herbert MD;  Location: [facility] CATH LAB;  Service: Cardiology;  Laterality: N/A;    PERCUTANEOUS PINNING OF HIP Left 3/23/2019    Procedure: PINNING, HIP, PERCUTANEOUS;  Surgeon: Jorje Hamlin MD;  Location: [facility] OR;  Service: Orthopedics;  Laterality: Left;    SMALL BOWEL ENTEROSCOPY N/A 10/29/2020    Procedure: ENTEROSCOPY;  Surgeon: Singh Kee III, MD;  Location: [facility];  Service: Endoscopy;  Laterality: N/A;    VARICOSE VEIN SURGERY         Family History:   Family History   Problem Relation  Age of Onset    Hypertension Mother     Heart disease Mother     Lung disease Father     Diabetes Sister     Diabetes Brother     Kidney disease Son     Breast cancer Neg Hx     Ovarian cancer Neg Hx        Medications:   Current Outpatient Medications on File Prior to Visit   Medication Sig Dispense Refill    acetaminophen (TYLENOL) 325 MG tablet Take 325 mg by mouth every 6 (six) hours as needed for Pain.      albuterol (PROVENTIL) 2.5 mg /3 mL (0.083 %) nebulizer solution Take 3 mLs (2.5 mg total) by nebulization every 6 (six) hours as needed for Wheezing or Shortness of Breath. USE 1 VIAL IN NEBULIZER EVERY 6 HOURS AS NEEDED FOR WHEEZING. RESCUE 360 each 3    allopurinoL (ZYLOPRIM) 100 MG tablet Take 1 tablet (100 mg total) by mouth once daily. 90 tablet 3    ascorbic acid, vitamin C, (VITAMIN C) 500 MG tablet Take 500 mg by mouth once daily.      calcium carbonate (OS-TASIA) 500 mg calcium (1,250 mg) tablet Take 1 tablet by mouth 2 (two) times daily.      diltiaZEM (CARDIZEM CD) 180 MG 24 hr capsule Take 1 capsule (180 mg total) by mouth once daily. 90 capsule 3    ferrous sulfate (FEOSOL) 325 mg (65 mg iron) Tab tablet Take 325 mg by mouth once daily.      fish oil-omega-3 fatty acids 300-1,000 mg capsule Take 2 g by mouth every evening.      fluticasone-umeclidin-vilanter (TRELEGY ELLIPTA) 200-62.5-25 mcg inhaler Inhale 1 puff into the lungs once daily. 60 each 11    folic acid-vit B6-vit B12 2.5-25-2 mg (FOLBIC) 2.5-25-2 mg Tab Take 1 tablet by mouth once daily.      furosemide (LASIX) 20 MG tablet Take 1 tablet only as needed, for swelling, increase SOB, weight gain of 3 lbs overnight or 5 lbs for the week 30 tablet prn    levothyroxine (SYNTHROID) 88 MCG tablet Take 1 tablet (88 mcg total) by mouth once daily. 90 tablet 3    losartan (COZAAR) 25 MG tablet Take 1 tablet (25 mg total) by mouth once daily. 90 tablet 3    mepolizumab 100 mg/mL AtIn Inject 1 mL (100 mg total) into the skin  every 28 days. 1 mL 12    montelukast (SINGULAIR) 10 mg tablet TAKE 1 TABLET BY MOUTH ONCE DAILY IN THE EVENING (Patient taking differently: Take 10 mg by mouth every evening.) 90 tablet 3    multivitamin (THERAGRAN) tablet Take 1 tablet by mouth once daily.      omeprazole (PRILOSEC) 40 MG capsule Take 1 capsule (40 mg total) by mouth once daily. 90 capsule 3    polyethylene glycol (GLYCOLAX) 17 gram/dose powder Take 17 g by mouth 2 (two) times daily. 850 g 3    rosuvastatin (CRESTOR) 20 MG tablet Take 1 tablet (20 mg total) by mouth once daily. 90 tablet 3    sertraline (ZOLOFT) 25 MG tablet Take 1 tablet by mouth once daily (Patient taking differently: Take 25 mg by mouth once daily.) 90 tablet 3    simethicone 180 mg Cap Take 1 capsule by mouth 3 (three) times daily as needed.      ciclopirox (PENLAC) 8 % Soln Apply topically nightly. 6.6 mL 3    [DISCONTINUED] clobetasol 0.05% (TEMOVATE) 0.05 % Oint Apply topically 2 (two) times daily. 45 g 1    [DISCONTINUED] diclofenac sodium (VOLTAREN) 1 % Gel Apply 2 g topically once daily. (Patient taking differently: Apply 2 g topically as needed.) 100 g prn    [DISCONTINUED] ketoconazole (NIZORAL) 2 % shampoo Apply topically twice a week. (Patient taking differently: Apply 1 application topically twice a week.) 120 mL 2    [DISCONTINUED] meclizine (ANTIVERT) 25 mg tablet Take 1 tablet (25 mg total) by mouth 3 (three) times daily as needed. 20 tablet prn    [DISCONTINUED] nitroGLYCERIN (NITROSTAT) 0.4 MG SL tablet Place 1 tablet (0.4 mg total) under the tongue every 5 (five) minutes as needed for Chest pain. As needed (Patient taking differently: Place 0.4 mg under the tongue every 5 (five) minutes as needed for Chest pain. Seek medical help if pain is not relieved after the third dose.) 30 tablet 3    [DISCONTINUED] nystatin (MYCOSTATIN) powder Apply topically 4 (four) times daily. 60 g 1     No current facility-administered medications on file prior to  visit.       Allergies:   Review of patient's allergies indicates:   Allergen Reactions    Carvedilol Other (See Comments)     Bradycardia and syncope    Boniva [ibandronate]     Codeine     Hydralazine analogues     Iodinated contrast media Hives    Kionex [sodium polystyrene sulfonate] Diarrhea     From encounter 12/11/17 for diarrhea--not true allergy.    Lisinopril     Morphine        Social History:   Social History     Socioeconomic History    Marital status: Legally    Tobacco Use    Smoking status: Former Smoker     Packs/day: 0.35     Years: 44.00     Pack years: 15.40     Types: Cigarettes     Start date: 1970    Smokeless tobacco: Never Used    Tobacco comment: 1/08/2015   Substance and Sexual Activity    Alcohol use: No     Alcohol/week: 0.0 standard drinks    Drug use: No    Sexual activity: Not Currently     Social Determinants of Health     Financial Resource Strain: Low Risk     Difficulty of Paying Living Expenses: Not hard at all   Food Insecurity: No Food Insecurity    Worried About Running Out of Food in the Last Year: Never true    Ran Out of Food in the Last Year: Never true   Transportation Needs: No Transportation Needs    Lack of Transportation (Medical): No    Lack of Transportation (Non-Medical): No   Physical Activity: Inactive    Days of Exercise per Week: 0 days    Minutes of Exercise per Session: 0 min   Stress: No Stress Concern Present    Feeling of Stress : Only a little   Social Connections: Unknown    Frequency of Communication with Friends and Family: More than three times a week    Frequency of Social Gatherings with Friends and Family: More than three times a week    Active Member of Clubs or Organizations: No    Attends Club or Organization Meetings: Never    Marital Status:    Housing Stability: Low Risk     Unable to Pay for Housing in the Last Year: No    Number of Places Lived in the Last Year: 1    Unstable Housing in the  "Last Year: No     PHYSICAL EXAMINATION:   General    Vitals:    08/25/22 1515   Weight: 55.8 kg (123 lb)   Height: 5' 3" (1.6 m)     Constitutional: Oriented to person, place, and time. No apparent distress. Pleasant.  HENT:   Head: Normocephalic and atraumatic.   Eyes: Right eye exhibits no discharge. Left eye exhibits no discharge. No scleral icterus.   Pulmonary/Chest: Effort normal. No respiratory distress.   Abdominal: There is no guarding.   Neurological: Alert and oriented to person, place, and time.   Psychiatric: Behavior is normal.   Right Knee Exam     Tenderness   The patient is experiencing no tenderness.     Range of Motion   Extension: 0   Flexion: 130     Tests   Varus: negative Valgus: negative  Lachman:  Anterior - negative      Patellar apprehension: negative    Other   Erythema: absent  Scars: absent  Sensation: normal  Pulse: present  Swelling: none  Effusion: no effusion present        INSPECTION: There is no swelling, ecchymoses, erythema or gross deformity of the right knee.  GAIT/DYNAMIC:  Using a rolling walker for ambulation.    Imaging  Lower extremity venous Doppler from 07/2022: Negative for DVT throughout right lower extremity.    Data Reviewed:  Ultrasound    Supportive Actions: Independent visualization of images or test specimens    ASSESSMENT:   1. Right leg pain      PLAN:     1. Time was spent reviewing the above diagnosis in depth with Blaire today, including acute management and rehabilitation.     2. Diagnostic ultrasound exam today of the posterior right knee was negative for the presence of a Desouza cyst.  She reports near complete resolution of her pain over recent days.  We discussed no intervention is needed today.  I encouraged her to be as physically active as possible.  She has completed home health encouraged her to continue her home exercise program as directed.    3. RTC prn.    This is a consult from Dr. Eli Edmonds. Please see the "Communications" section of " "Epic to see how the consulting physician received the report of today's findings and recommendations. If it's an [facility] physician, it will be forwarded to his/her "in basket".    The above note was completed, in part, with the aid of Dragon dictation software/hardware. Translation errors may be present.      "

## 2022-08-26 NOTE — PLAN OF CARE
Problem: Anemia  Goal: Anemia Symptom Improvement  Outcome: Ongoing, Progressing  Intervention: Monitor and Manage Anemia  Flowsheets (Taken 8/26/2022 1125)  Oral Nutrition Promotion: rest periods promoted  Fatigue Management: frequent rest breaks encouraged

## 2022-09-06 NOTE — PROGRESS NOTES
Chief Complaint   Patient presents with    Nail Care     B Foot              MEDICAL DECISION MAKING:      No diagnosis found.        Patient was evaluated and risk assessed today(9/2/2022). The patient is at moderate risk for developing pedal complications. I explained to the patient that PAD and uncontrolled DM2 can make healing injuries difficult leading to wounds or gangrene.  In general, I recommend monitoring the feet daily for any areas of pressure or injuries. If any areas appear to be healing slowly or become infected, seek medical attention as soon as possible. Wear supportive shoes and avoid barefoot walking.   Shoe inspection.     Continue good nutrition and blood sugar control to help prevent podiatric complications of diabetes.   Maintain proper foot hygiene.   Continue wearing proper shoe gear, daily foot inspections, never walking without protective shoe gear, never putting sharp instruments to feet.  Follow up in 4 months  Routine Foot Care    Date/Time: 9/2/2022 2:00 PM  Performed by: Francis Duke DPM  Authorized by: Francis Duke DPM     Consent Done?:  Yes (Verbal)  Hyperkeratotic Skin Lesions?: No      Nail Care Type:  Debride  Location(s): All  (Left 1st Toe, Left 3rd Toe, Left 2nd Toe, Left 4th Toe, Left 5th Toe, Right 1st Toe, Right 2nd Toe, Right 3rd Toe, Right 4th Toe and Right 5th Toe)  Patient tolerance:  Patient tolerated the procedure well with no immediate complications                HPI:   The patient is a 89 y.o.  female  who presents for a diabetic foot exam.     Patient reports some presence of abnormal sensation to the feet .    History of diabetic foot ulcers: no   History of foot surgery: no.     Shoes worn today:  casual  some burning, numbness and tingling in the feet.  no cramping or deep aching when walking.    The patient is under the Active Care of Eli Edmonds MD for the qualifying diagnosis of diabetes mellitus with 8/2/22.  Patient was last seen on Nail Care  (B Foot)  .          Patient Active Problem List   Diagnosis    Diabetic neuropathy, type II diabetes mellitus    CKD (chronic kidney disease), stage III, eGFR 39, progressive 31    Hypothyroidism with abnormal thyroid function test    Hypertension associated with diabetes    Hyperlipidemia associated with type 2 diabetes mellitus    Type 2 diabetes mellitus with stage 4 chronic kidney disease    Right carotid bruit    Anxiety    UTI (urinary tract infection)    Abdominal aortic aneurysm without rupture    Hip arthritis    Degenerative spinal arthritis    Osteoporosis    Left ventricular diastolic dysfunction with preserved systolic function    Hypertensive left ventricular hypertrophy, without heart failure    Smoker, quit 5/2016, 25 pck-years    Abdominal obesity    Vasovagal syncope    Body mass index (BMI) 23.0-23.9, adult, today 24.1    Iron deficiency anemia due to chronic blood loss    Proteinuria due to type 2 diabetes mellitus    History of syncope in childhood    Prerenal azotemia    Drug-induced constipation    COPD exacerbation    Asthmatic bronchitis with acute exacerbation    Controlled type 2 diabetes mellitus, without long-term current use of insulin    Asthma-COPD overlap syndrome    Eosinophilic asthma    Acute kidney injury superimposed on chronic kidney disease 3    Moderate malnutrition    Type 2 diabetes mellitus with stage 4 chronic kidney disease, without long-term current use of insulin    Chronic anticoagulation    Constipation    DVT (deep venous thrombosis)    History of pulmonary embolism    Anemia due to multiple mechanisms    Chronic anemia    Normocytic normochromic anemia    Anemia, chronic renal failure, stage 3 (moderate)    Homocysteinemia    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    Diastolic CHF, chronic    Anemia of chronic disease    Kidney stones    Macrocytic anemia    Orthostatic hypotension    Closed left hip fracture    Hip pain, left    Chronic respiratory failure with  hypoxia    Nonrheumatic aortic valve stenosis    Mitral stenosis    Hypoalbuminemia    Hypoglycemia    CKD (chronic kidney disease), stage IV    Vertigo    Left foot pain    Posterior tibial tendon dysfunction, left    Arthritis    Pulmonary hypertension    Chronic diastolic heart failure    Status post placement of implantable loop recorder    Iron deficiency anemia, unspecified    Anemia due to stage 3 chronic kidney disease treated with erythropoietin    Fall at home    Personal history of thromboembolic disease    GAVE (gastric antral vascular ectasia)    Cellulitis of right lower extremity    Hypertensive urgency    Secondary hyperparathyroidism of renal origin    Other thrombophilia    Angina pectoris, unspecified           Current Outpatient Medications on File Prior to Visit   Medication Sig Dispense Refill    acetaminophen (TYLENOL) 325 MG tablet Take 325 mg by mouth every 6 (six) hours as needed for Pain.      albuterol (PROVENTIL) 2.5 mg /3 mL (0.083 %) nebulizer solution Take 3 mLs (2.5 mg total) by nebulization every 6 (six) hours as needed for Wheezing or Shortness of Breath. USE 1 VIAL IN NEBULIZER EVERY 6 HOURS AS NEEDED FOR WHEEZING. RESCUE 360 each 3    allopurinoL (ZYLOPRIM) 100 MG tablet Take 1 tablet (100 mg total) by mouth once daily. 90 tablet 3    ascorbic acid, vitamin C, (VITAMIN C) 500 MG tablet Take 500 mg by mouth once daily.      calcium carbonate (OS-TASIA) 500 mg calcium (1,250 mg) tablet Take 1 tablet by mouth 2 (two) times daily.      diltiaZEM (CARDIZEM CD) 180 MG 24 hr capsule Take 1 capsule (180 mg total) by mouth once daily. 90 capsule 3    ferrous sulfate (FEOSOL) 325 mg (65 mg iron) Tab tablet Take 325 mg by mouth once daily.      fish oil-omega-3 fatty acids 300-1,000 mg capsule Take 2 g by mouth every evening.      fluticasone-umeclidin-vilanter (TRELEGY ELLIPTA) 200-62.5-25 mcg inhaler Inhale 1 puff into the lungs once daily. 60 each 11    folic acid-vit B6-vit B12 2.5-25-2  mg (FOLBIC) 2.5-25-2 mg Tab Take 1 tablet by mouth once daily.      furosemide (LASIX) 20 MG tablet Take 1 tablet only as needed, for swelling, increase SOB, weight gain of 3 lbs overnight or 5 lbs for the week 30 tablet prn    levothyroxine (SYNTHROID) 88 MCG tablet Take 1 tablet (88 mcg total) by mouth once daily. 90 tablet 3    losartan (COZAAR) 25 MG tablet Take 1 tablet (25 mg total) by mouth once daily. 90 tablet 3    mepolizumab 100 mg/mL AtIn Inject 1 mL (100 mg total) into the skin every 28 days. 1 mL 12    montelukast (SINGULAIR) 10 mg tablet TAKE 1 TABLET BY MOUTH ONCE DAILY IN THE EVENING (Patient taking differently: Take 10 mg by mouth every evening.) 90 tablet 3    multivitamin (THERAGRAN) tablet Take 1 tablet by mouth once daily.      omeprazole (PRILOSEC) 40 MG capsule Take 1 capsule (40 mg total) by mouth once daily. 90 capsule 3    polyethylene glycol (GLYCOLAX) 17 gram/dose powder Take 17 g by mouth 2 (two) times daily. 850 g 3    rosuvastatin (CRESTOR) 20 MG tablet Take 1 tablet (20 mg total) by mouth once daily. 90 tablet 3    sertraline (ZOLOFT) 25 MG tablet Take 1 tablet by mouth once daily (Patient taking differently: Take 25 mg by mouth once daily.) 90 tablet 3    simethicone 180 mg Cap Take 1 capsule by mouth 3 (three) times daily as needed.      ciclopirox (PENLAC) 8 % Soln Apply topically nightly. 6.6 mL 3    [DISCONTINUED] clobetasol 0.05% (TEMOVATE) 0.05 % Oint Apply topically 2 (two) times daily. 45 g 1    [DISCONTINUED] diclofenac sodium (VOLTAREN) 1 % Gel Apply 2 g topically once daily. (Patient taking differently: Apply 2 g topically as needed.) 100 g prn    [DISCONTINUED] ketoconazole (NIZORAL) 2 % shampoo Apply topically twice a week. (Patient taking differently: Apply 1 application topically twice a week.) 120 mL 2    [DISCONTINUED] meclizine (ANTIVERT) 25 mg tablet Take 1 tablet (25 mg total) by mouth 3 (three) times daily as needed. 20 tablet prn    [DISCONTINUED] nitroGLYCERIN  "(NITROSTAT) 0.4 MG SL tablet Place 1 tablet (0.4 mg total) under the tongue every 5 (five) minutes as needed for Chest pain. As needed (Patient taking differently: Place 0.4 mg under the tongue every 5 (five) minutes as needed for Chest pain. Seek medical help if pain is not relieved after the third dose.) 30 tablet 3    [DISCONTINUED] nystatin (MYCOSTATIN) powder Apply topically 4 (four) times daily. 60 g 1     No current facility-administered medications on file prior to visit.           Review of patient's allergies indicates:   Allergen Reactions    Carvedilol Other (See Comments)     Bradycardia and syncope    Boniva [ibandronate]     Codeine     Hydralazine analogues     Iodinated contrast media Hives    Kionex [sodium polystyrene sulfonate] Diarrhea     From encounter 12/11/17 for diarrhea--not true allergy.    Lisinopril     Morphine              ROS:  General ROS: negative  Respiratory ROS: no cough, shortness of breath, or wheezing  Cardiovascular ROS: no chest pain or dyspnea on exertion  Musculoskeletal ROS: negative  Neurological ROS:   positive for - numbness/tingling  Dermatological ROS: negative      LAST HbA1c:   Lab Results   Component Value Date    HGBA1C 5.7 (H) 04/22/2022           EXAM:   Vitals:    09/02/22 1416   Weight: 55.3 kg (122 lb)   Height: 5' 3" (1.6 m)       General: healthy    Vascular:   Dorsalis Pedis:  absent     Posterior Tibial:  absent  Capillary refill time:  3 seconds  Temperature of toes cool to touch  Edema: Present bilaterally      Neurological:     Sharp touch:  normal  Light touch: normal  Tinels Sign:  Absent  [place]:  Absent deficits to sharp/dull, light touch or vibratory sensation feet, ten points tested.    Absent paresthesias (Abnormal spontaneous sensations in feet)        Dermatological:   Skin: thin and atrophic  Hair growth:  decreased  Wounds/Ulcers:  Absent  Bruising:  Absent  Erythema:  Absent  Toenails:   thickness:  thickened;   Yellowish and Brownish in " color,  with subungual debris.   Absent paronychia  Hyperkeratotic lesions to the n/a      Musculoskeletal:   No tenderness to palpation  ROM is full without pain or crepitation  Bunions:  Present  Hammertoes: Present  Flat foot

## 2022-09-09 NOTE — PLAN OF CARE
Problem: Anemia  Goal: Anemia Symptom Improvement  Outcome: Ongoing, Progressing  Intervention: Monitor and Manage Anemia  Flowsheets (Taken 9/9/2022 0453)  Oral Nutrition Promotion: rest periods promoted  Fatigue Management:   activity schedule adjusted   frequent rest breaks encouraged

## 2022-09-12 PROBLEM — D63.1 ERYTHROPOIETIN DEFICIENCY ANEMIA: Status: ACTIVE | Noted: 2022-01-01

## 2022-09-13 NOTE — PROGRESS NOTES
[facility] Hematology/Oncology  PROGRESS NOTE - Follow-up Visit        Subjective:       Patient ID:   NAME: Blaire Cage : 1930     89 y.o. female    Referring Doc: Sandro  Other Physicians: Cande Garg (PA); Eli Edmonds (PCP); Jeffrey Christopher (Pulm); Sandra (GI)    Chief Complaint:  DVT and PE f/u    History of Present Illness:     Patient is being seen today in person. The patient is on today to go over the results of the recently ordered labs, tests and studies. She is here with her  daughter today.     She is breathing ok. She denies any swelling in the legs. She denies any CP, SOB, HA's. No excessive bleeding or bruising.      She is using walker today;      She sees Dr Gramajo every 6 months      Discussed COVID19 precautions -she had her vaccination       ROS:   GEN: normal without any fever, night sweats or weight loss  HEENT: normal with no HA's, sore throat, stiff neck, changes in vision  CV: normal with no CP, SOB, PND, WEST or orthopnea  PULM: normal with no SOB, cough, hemoptysis, sputum or pleuritic pain  GI: some recent N/V, and diarrhea  : normal with no hematuria, dysuria  BREAST: normal with no mass, discharge, pain  SKIN: normal with no rash, erythema, bruising, or swelling    Allergies:  Review of patient's allergies indicates:   Allergen Reactions    Carvedilol Other (See Comments)     Bradycardia and syncope    Boniva [ibandronate]     Codeine     Hydralazine analogues     Iodinated contrast- oral and iv dye Hives    Kionex [sodium polystyrene sulfonate] Diarrhea     From encounter 17 for diarrhea--not true allergy.    Lisinopril     Morphine        Medications:    Current Outpatient Medications:     acetaminophen (TYLENOL) 325 MG tablet, Take 325 mg by mouth every 6 (six) hours as needed for Pain., Disp: , Rfl:     albuterol (PROVENTIL) 2.5 mg /3 mL (0.083 %) nebulizer solution, Take 3 mLs (2.5 mg total) by nebulization every 6 (six) hours as needed for Wheezing or Shortness of  Breath. USE 1 VIAL IN NEBULIZER EVERY 6 HOURS AS NEEDED FOR WHEEZING. RESCUE, Disp: 360 each, Rfl: 3    allopurinoL (ZYLOPRIM) 100 MG tablet, Take 1 tablet (100 mg total) by mouth once daily., Disp: 90 tablet, Rfl: 3    ascorbic acid, vitamin C, (VITAMIN C) 500 MG tablet, Take 500 mg by mouth once daily., Disp: , Rfl:     calcium carbonate (OS-TASIA) 500 mg calcium (1,250 mg) tablet, Take 1 tablet by mouth 2 (two) times daily., Disp: , Rfl:     ciclopirox (PENLAC) 8 % Soln, Apply topically nightly., Disp: 6.6 mL, Rfl: 3    diltiaZEM (CARDIZEM CD) 180 MG 24 hr capsule, Take 1 capsule (180 mg total) by mouth once daily., Disp: 90 capsule, Rfl: 3    ferrous sulfate (FEOSOL) 325 mg (65 mg iron) Tab tablet, Take 325 mg by mouth once daily., Disp: , Rfl:     fish oil-omega-3 fatty acids 300-1,000 mg capsule, Take 2 g by mouth every evening., Disp: , Rfl:     fluticasone-umeclidin-vilanter (TRELEGY ELLIPTA) 200-62.5-25 mcg inhaler, Inhale 1 puff into the lungs once daily., Disp: 60 each, Rfl: 11    folic acid-vit B6-vit B12 2.5-25-2 mg (FOLBIC) 2.5-25-2 mg Tab, Take 1 tablet by mouth once daily., Disp: , Rfl:     furosemide (LASIX) 20 MG tablet, Take 1 tablet only as needed, for swelling, increase SOB, weight gain of 3 lbs overnight or 5 lbs for the week, Disp: 30 tablet, Rfl: prn    levothyroxine (SYNTHROID) 88 MCG tablet, Take 1 tablet (88 mcg total) by mouth once daily., Disp: 90 tablet, Rfl: 3    losartan (COZAAR) 25 MG tablet, Take 1 tablet (25 mg total) by mouth once daily., Disp: 90 tablet, Rfl: 3    mepolizumab 100 mg/mL AtIn, Inject 1 mL (100 mg total) into the skin every 28 days., Disp: 1 mL, Rfl: 12    montelukast (SINGULAIR) 10 mg tablet, TAKE 1 TABLET BY MOUTH ONCE DAILY IN THE EVENING (Patient taking differently: Take 10 mg by mouth every evening.), Disp: 90 tablet, Rfl: 3    multivitamin (THERAGRAN) tablet, Take 1 tablet by mouth once daily., Disp: , Rfl:     omeprazole (PRILOSEC) 40 MG capsule, Take 1 capsule  (40 mg total) by mouth once daily., Disp: 90 capsule, Rfl: 3    polyethylene glycol (GLYCOLAX) 17 gram/dose powder, Take 17 g by mouth 2 (two) times daily., Disp: 850 g, Rfl: 3    rosuvastatin (CRESTOR) 20 MG tablet, Take 1 tablet (20 mg total) by mouth once daily., Disp: 90 tablet, Rfl: 3    sertraline (ZOLOFT) 25 MG tablet, Take 1 tablet by mouth once daily (Patient taking differently: Take 25 mg by mouth once daily.), Disp: 90 tablet, Rfl: 3    simethicone 180 mg Cap, Take 1 capsule by mouth 3 (three) times daily as needed., Disp: , Rfl:     PMHx/PSHx Updates:  See patient's last visit with me on 3/15/2022  See H&P on 6/29/2018        Pathology:  Cancer Staging  No matching staging information was found for the patient.          Objective:     Vitals:  Blood pressure (!) 180/63, pulse 78, temperature 98 °F (36.7 °C), weight 55.8 kg (123 lb).        Physical Examination:   GEN: no apparent distress, comfortable; AAOx3  HEAD: atraumatic and normocephalic  EYES: no conjunctival pallor or muddiness, no icterus; normal pupil reaction to ambient light  ENT: OMM, no pharyngeal erythema, external bilateral ears WNL; no visible thrush or ulcers  NECK: no masses or swelling, trachea midline, no visible LAD/LN's   CV: no palpitations; no pedal edema; no noticeable JVD or neck vein distension  CHEST: Normal respiratory effort; chest wall breath movements symmetrical; no audible wheezing  ABDOM: non-distended; no bloating  MUSC/Skeletal: ROM normal; joints visibly normal; no deformities; using walker today;   arthropathy  EXTREM: no clubbing, cyanosis, inflammation or swelling  SKIN: no rashes, lesions, ulcers, petechiae or subcutaneous nodules; vitiligo and chronic age related skin changes  : no carter  NEURO: moving all 4 extremities; AAOx3; no tremors  PSYCH: normal mood, affect and behavior  LYMPH: no visible LN's or LAD  LN's             Labs:          Lab Results   Component Value Date    WBC 5.95 09/08/2022    HGB 9.6  (L) 09/08/2022    HCT 29.4 (L) 09/08/2022    MCV 99 (H) 09/08/2022     09/08/2022            BMP  Lab Results   Component Value Date     08/23/2022    K 4.7 08/23/2022     (H) 08/23/2022    CO2 23 08/23/2022    BUN 47 (H) 08/23/2022    CREATININE 2.1 (H) 08/23/2022    CALCIUM 8.9 08/23/2022    ANIONGAP 6 (L) 08/23/2022    ESTGFRAFRICA 24.4 (A) 07/26/2022    EGFRNONAA 21.2 (A) 07/26/2022        Lab Results   Component Value Date    IRON 80 08/23/2022    TIBC 241 (L) 08/23/2022    FERRITIN 369 (H) 08/23/2022     Lab Results   Component Value Date    GOAGQSGK82 649 12/06/2021     Lab Results   Component Value Date    FOLATE 35.0 (H) 12/06/2021           Radiology/Diagnostic Studies:    Ct Head Without Contrast    Result Date: 7/16/2018  EXAMINATION: CT HEAD WITHOUT CONTRAST CLINICAL HISTORY: Dizziness; TECHNIQUE: 5 mm noncontrast axial images were acquired through the head. COMPARISON: CT head without contrast-11/29/2014 FINDINGS: The brain is normally formed with preserved gray-white matter junction differentiation. No evidence of acute/recent major vascular territory cerebral infarction, parenchymal hemorrhage, or intra-axial mass.  There is age-appropriate cerebral volume loss with associated compensatory enlargement of the ventricular system and widening of the CSF spaces over both cerebral convexities.  There are confluent areas of periventricular white matter hypoattenuation compatible with age-appropriate chronic microvascular ischemic changes. No hydrocephalus.  No effacement of the skull-base cisterns.  No extra-axial fluid collections or blood products. The paranasal sinuses and mastoid air cells are clear.  There is rightward bony nasal septal deviation.  The visualized orbits are unremarkable.  The bony calvarium and visualized facial bones show no acute abnormality.     No acute intracranial abnormality appreciated.  No interval detrimental change in the imaging appearance of the brain  when compared to the previous study. Electronically signed by: Aubrey Davis MD Date:    07/16/2018 Time:    08:01    Radiology Report    Result Date: 7/15/2018  Ordered by an unspecified provider.      I have reviewed all available lab results and radiology reports.    Assessment/Plan:   (1) 89 y.o. female with diagnosis of a recent LLE DVT and Pulmonary emboli who has been referred by Dr Jo with Neph for evaluation by medical hematology/oncology. She was hospitalized [facility]. She has never had any clots before. No family hx/of clots.    - factor panel, protein C and S, ATIII adequate  - antiphosphatidyl negative; LA negative  - homocysteine elevated at 20.2  - MTHFR-C heterozygous positive - discussed the genetic implications with her and her daughter  - prothrombin II negative    2/25/2021:  - she is on eliquis bid but plans to switch to xarelto in near future due to insurance    9/14/2021:  - she is on xarelto; no excessive bleeding or bruising    12/14/2021:  - continued on xarelto with no excessive gross bleeding or bruising  - discussed consideration for her to discontinue the xarelto at this time especially with her advanced age and risk for falls and the continued GI issues    3/15/2022:  - she has since been off of the xarelto  - she sees Dr Gramajo again in 6 months  - no current blood in stool    9/14/2022:  - she sees Dr Gramajo every 6 months  - no recent bleeding      (2) COPD/asthma; former smoker     (3) Left ventricular dysfunction     (4) HTN and hypercholesterolemia     (5) CRI - stage III     (6) Anemia - most likely a multifactorial process with iron deficiency diagnosis, anemia of chronic disorders, anemia of chronic renal   - she is on iron and MVI  - iron panel and vitamins are adequate    2/25/2021:  - She had recent scope with Dr Gramajo and had several areas that were cauterized. She had several EGD's and colonoscopies and even had a capsule endoscopy  - hgb at 9.6 and iron panel is  adequate    9/14/2021:  - latest hgb at 9.8  - iron was 18 in July 2021 and TIBC 154  - she is on iron po daily and folate    12/14/20221:  - latest hgb 9.9  - followed by Dr Gramajo with GI and on IV iron per his direction    3/15/2022:  - latest hgb at 9.5  - no current GIB  - she receives periodic IV irons through Dr Jo's direction    9/14/2022:  - hgb 9.6  - iron panel currently adequate  - she gets procrit periodically via Dr Jo's direction    (7) DM and hypothyroidism    (8) Prior hip fracture - left - needed pin placement only    (9) Vertigo - seen by ENT            Chronic anticoagulation    Deep vein thrombosis (DVT) of proximal vein of left lower extremity, unspecified chronicity    History of pulmonary embolism    Personal history of thromboembolic disease    Macrocytic anemia    Normocytic normochromic anemia    Iron deficiency anemia, unspecified iron deficiency anemia type    Anemia of chronic disease    Anemia, chronic renal failure, stage 3 (moderate)    Chronic anemia    Anemia due to stage 3 chronic kidney disease treated with erythropoietin    Anemia due to multiple mechanisms    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    Smoker, quit 5/2016, 25 pck-years    Homocysteinemia        PLAN:  1. Continue Folbic for the hyperhomocysteinemia  2. Previously discontinued the xarelto due to advanced age, risk of falls and continued GI issues  3. Previously  discussed the genetic implications of the MTHFR-C in siblings and children  4. F/u with PCP, GI, neph etc  5.  monitor labs monthly for now (encouraged compliance)       RTC in  6 months  Fax note to Eli Edmonds MD; [facility]    Discussion:       Total Time spent on patient:    I spent over 15 mins of time with the patient. Reviewed results of the recently ordered labs, tests, reports and studies; made directives with regards to the results. Over half of this time was spent couseling and coordinating care, making treatment  and analytical decisions; ordering necessary labs, tests and studies; and discussing treatment options and setting up treatment plan(s) if indicated.        COVID-19 Discussion:    I had long discussion with patient and any applicable family about the COVID-19 coronavirus epidemic and the recommended precautions with regard to cancer and/or hematology patients. I have re-iterated the CDC recommendations for adequate hand washing, use of hand -like products, and coughing into elbow, etc. In addition, especially for our patients who are on chemotherapy and/or our otherwise immunocompromised patients, I have recommended avoidance of crowds, including movie theaters, restaurants, churches, etc. I have recommended avoidance of any sick or symptomatic family members and/or friends. I have also recommended avoidance of any raw and unwashed food products, and general avoidance of food items that have not been prepared by themselves. The patient has been asked to call us immediately with any symptom developments, issues, questions or other general concerns.       Anticoagulation Discussion:    I had long discussion with patient and any applicable family members about the benefit and/or need for anticoagulation. I communicated about the risks of bleeding while on any anticoagulation, which could be serious and/or life-threatening, and which can occur at any time, regardless of degree of the level of anticoagulation. I expressed the need for compliance with any anticoagulation regimen and that failure to do so could potential lead to excessive bleeding, and risk to health and/or life. In particular, with patients on coumadin therapy, compliance with requested blood work is absolutely essential, as coumadin levels can vary from time to time, and failure to do so could potentially place the patient at risk for bleeding and/or clotting events which could be fatal. Patients on coumadin are encouraged to call the day after  they have their levels drawn, as to obtain the appropriate instructions from my staff. Patients are aware that self-regulating or self-dosing of their medications is strictly prohibited.       I have explained all of the above in detail and the patient understands all of the current recommendation(s). I have answered all of their questions to the best of my ability and to their complete satisfaction.   The patient is to continue with the current management plan.            Electronically signed by Issac Alvarez MD

## 2022-09-15 NOTE — TELEPHONE ENCOUNTER
Specialty Pharmacy - Refill Coordination    Specialty Medication Orders Linked to Encounter      Flowsheet Row Most Recent Value   Medication #1 mepolizumab 100 mg/mL AtIn (Order#[id number], Rx#[id number])            Refill Questions - Documented Responses      Flowsheet Row Most Recent Value   Patient Availability and HIPAA Verification    Does patient want to proceed with activity? Yes   HIPAA/medical authority confirmed? Yes   Relationship to patient of person spoken to? Child   Refill Screening Questions    Changes to allergies? No   Changes to medications? No   New conditions since last clinic visit? No   Unplanned office visit, urgent care, ED, or hospital admission in the last 4 weeks? No   How does patient/caregiver feel medication is working? Good   Financial problems or insurance changes? No   How many doses of your specialty medications were missed in the last 4 weeks? 0   Would patient like to speak to a pharmacist? No   When does the patient need to receive the medication? 09/22/22   Refill Delivery Questions    How will the patient receive the medication? Delivery Ronda   When does the patient need to receive the medication? 09/22/22   Shipping Address Prescription   Address in [facility] confirmed and updated if neccessary? Yes   Expected Copay ($) 0   Is the patient able to afford the medication copay? Yes   Payment Method zero copay   Days supply of Refill 28   Supplies needed? No supplies needed   Refill activity completed? Yes   Refill activity plan Refill scheduled   Shipment/Pickup Date: 09/20/22            Current Outpatient Medications   Medication Sig    acetaminophen (TYLENOL) 325 MG tablet Take 325 mg by mouth every 6 (six) hours as needed for Pain.    albuterol (PROVENTIL) 2.5 mg /3 mL (0.083 %) nebulizer solution Take 3 mLs (2.5 mg total) by nebulization every 6 (six) hours as needed for Wheezing or Shortness of Breath. USE 1 VIAL IN NEBULIZER EVERY 6 HOURS AS NEEDED FOR WHEEZING.  RESCUE    allopurinoL (ZYLOPRIM) 100 MG tablet Take 1 tablet (100 mg total) by mouth once daily.    ascorbic acid, vitamin C, (VITAMIN C) 500 MG tablet Take 500 mg by mouth once daily.    calcium carbonate (OS-TASIA) 500 mg calcium (1,250 mg) tablet Take 1 tablet by mouth 2 (two) times daily.    ciclopirox (PENLAC) 8 % Soln Apply topically nightly.    diltiaZEM (CARDIZEM CD) 180 MG 24 hr capsule Take 1 capsule (180 mg total) by mouth once daily.    ferrous sulfate (FEOSOL) 325 mg (65 mg iron) Tab tablet Take 325 mg by mouth once daily.    fish oil-omega-3 fatty acids 300-1,000 mg capsule Take 2 g by mouth every evening.    fluticasone-umeclidin-vilanter (TRELEGY ELLIPTA) 200-62.5-25 mcg inhaler Inhale 1 puff into the lungs once daily.    folic acid-vit B6-vit B12 2.5-25-2 mg (FOLBIC) 2.5-25-2 mg Tab Take 1 tablet by mouth once daily.    furosemide (LASIX) 20 MG tablet Take 1 tablet only as needed, for swelling, increase SOB, weight gain of 3 lbs overnight or 5 lbs for the week    levothyroxine (SYNTHROID) 88 MCG tablet Take 1 tablet (88 mcg total) by mouth once daily.    losartan (COZAAR) 25 MG tablet Take 1 tablet (25 mg total) by mouth once daily.    mepolizumab 100 mg/mL AtIn Inject 1 mL (100 mg total) into the skin every 28 days.    montelukast (SINGULAIR) 10 mg tablet TAKE 1 TABLET BY MOUTH ONCE DAILY IN THE EVENING (Patient taking differently: Take 10 mg by mouth every evening.)    multivitamin (THERAGRAN) tablet Take 1 tablet by mouth once daily.    omeprazole (PRILOSEC) 40 MG capsule Take 1 capsule (40 mg total) by mouth once daily.    polyethylene glycol (GLYCOLAX) 17 gram/dose powder Take 17 g by mouth 2 (two) times daily.    rosuvastatin (CRESTOR) 20 MG tablet Take 1 tablet (20 mg total) by mouth once daily.    sertraline (ZOLOFT) 25 MG tablet Take 1 tablet by mouth once daily (Patient taking differently: Take 25 mg by mouth once daily.)    simethicone 180 mg Cap Take 1 capsule by mouth 3 (three) times  daily as needed.   Last reviewed on 9/14/2022  2:24 PM by Issac Alvarez MD    Review of patient's allergies indicates:   Allergen Reactions    Carvedilol Other (See Comments)     Bradycardia and syncope    Boniva [ibandronate]     Codeine     Hydralazine analogues     Iodinated contrast media Hives    Kionex [sodium polystyrene sulfonate] Diarrhea     From encounter 12/11/17 for diarrhea--not true allergy.    Lisinopril     Morphine     Last reviewed on  9/14/2022 2:24 PM by Issac Alvarez      Tasks added this encounter   10/13/2022 - Refill Call (Auto Added)   Tasks due within next 3 months   No tasks due.     Harvey Miller, PharmD  [facility]  [address] [place] [id number]  Phone: [phone]  Fax: [phone]

## 2022-09-19 NOTE — TELEPHONE ENCOUNTER
Zoloft increased to 50mg a day and new rx sent. May take 2 25 mg until out. Make f/u with me or NP in 1 month

## 2022-09-20 NOTE — TELEPHONE ENCOUNTER
"Called patient to inform her per Dr Edmonds    " Zoloft increased to 50mg a day and new rx sent. May take 2 25 mg until out. Make f/u with me or NP in 1 month "    No answer , left voicemail to return call.   "

## 2022-09-23 NOTE — PLAN OF CARE
Problem: Fall Injury Risk  Goal: Absence of Fall and Fall-Related Injury  Outcome: Ongoing, Progressing  Intervention: Identify and Manage Contributors  Flowsheets (Taken 9/23/2022 1413)  Self-Care Promotion: independence encouraged  Medication Review/Management: medications reviewed  Intervention: Promote Injury-Free Environment  Flowsheets (Taken 9/23/2022 1413)  Safety Promotion/Fall Prevention: medications reviewed

## 2022-10-16 PROBLEM — I50.43 ACUTE ON CHRONIC COMBINED SYSTOLIC AND DIASTOLIC HEART FAILURE: Status: ACTIVE | Noted: 2021-10-19

## 2022-10-17 NOTE — SUBJECTIVE & OBJECTIVE
Interval History: Ms Cage continues to cough but is much less short of breath    Review of Systems   Constitutional:  Positive for activity change, diaphoresis and fatigue.   HENT: Negative.     Eyes: Negative.    Respiratory:  Positive for cough, chest tightness and shortness of breath.    Cardiovascular: Negative.    Gastrointestinal: Negative.    Endocrine: Positive for cold intolerance and heat intolerance.   Genitourinary: Negative.    Musculoskeletal:  Positive for arthralgias and myalgias.   Skin: Negative.    Allergic/Immunologic: Negative.    Neurological:  Positive for weakness.   Hematological: Negative.    Psychiatric/Behavioral:  The patient is nervous/anxious.    Objective:     Vital Signs (Most Recent):  Temp: 98.9 °F (37.2 °C) (10/17/22 1540)  Pulse: 74 (10/17/22 1540)  Resp: 16 (10/17/22 1540)  BP: 137/63 (10/17/22 1540)  SpO2: 98 % (10/17/22 1540) Vital Signs (24h Range):  Temp:  [97.7 °F (36.5 °C)-98.9 °F (37.2 °C)] 98.9 °F (37.2 °C)  Pulse:  [66-88] 74  Resp:  [15-18] 16  SpO2:  [96 %-100 %] 98 %  BP: (137-183)/(63-81) 137/63     Weight: 56.5 kg (124 lb 9 oz)  Body mass index is 22.06 kg/m².    Intake/Output Summary (Last 24 hours) at 10/17/2022 1725  Last data filed at 10/17/2022 1541  Gross per 24 hour   Intake 480 ml   Output 900 ml   Net -420 ml      Physical Exam  Vitals and nursing note reviewed.   Constitutional:       General: She is not in acute distress.  HENT:      Head: Normocephalic and atraumatic.      Nose: Nose normal.      Mouth/Throat:      Mouth: Mucous membranes are moist.   Eyes:      Extraocular Movements: Extraocular movements intact.      Pupils: Pupils are equal, round, and reactive to light.   Cardiovascular:      Rate and Rhythm: Normal rate and regular rhythm.   Pulmonary:      Effort: Pulmonary effort is normal.      Breath sounds: Rhonchi and rales present.      Comments: Right lung base  Abdominal:      General: Bowel sounds are normal.   Musculoskeletal:          General: Normal range of motion.      Cervical back: Normal range of motion and neck supple.   Skin:     General: Skin is warm.   Neurological:      Mental Status: She is alert and oriented to person, place, and time.   Psychiatric:      Comments: Somewhat anxious       Significant Labs: All pertinent labs within the past 24 hours have been reviewed.  Recent Lab Results  (Last 5 results in the past 24 hours)        10/17/22  1619   10/17/22  1208   10/17/22  0855   10/17/22  0506   10/17/22  0445        Albumin       2.8         Alkaline Phosphatase       70         ALT       21         Anion Gap       13         AST       29         Baso #       0.01         Basophil %       0.2         BILIRUBIN TOTAL       0.5  Comment: For infants and newborns, interpretation of results should be based  on gestational age, weight and in agreement with clinical  observations.    Premature Infant recommended reference ranges:  Up to 24 hours.............<8.0 mg/dL  Up to 48 hours............<12.0 mg/dL  3-5 days..................<15.0 mg/dL  6-29 days.................<15.0 mg/dL           BUN       41         Calcium       8.5         Chloride       107         CO2       20         Creatinine       1.7         Differential Method       Automated         eGFR       28.0         Eos #       0.0         Eosinophil %       0.0         Glucose       140         Gran # (ANC)       5.1         Gran %       83.9         Hematocrit       29.1         Hemoglobin       9.3         Immature Grans (Abs)       0.06  Comment: Mild elevation in immature granulocytes is non specific and   can be seen in a variety of conditions including stress response,   acute inflammation, trauma and pregnancy. Correlation with other   laboratory and clinical findings is essential.           Immature Granulocytes       1.0         Lactate, Jaime       0.7  Comment: Falsely low lactic acid results can be found in samples   containing >=13.0 mg/dL total bilirubin  and/or >=3.5 mg/dL   direct bilirubin.           Lymph #       0.8         Lymph %       13.6         Magnesium       1.8         MCH       31.8         MCHC       32.0         MCV       100         Mono #       0.1         Mono %       1.3         MPV       11.4         nRBC       0         Phosphorus       4.1         Platelets       196         POC Glucose 157   171       154       Potassium       4.4         PROTEIN TOTAL       5.8         RBC       2.92         RDW       13.6         SARS-CoV-2 RNA, Amplification, Qual     Negative  Comment: This test utilizes isothermal nucleic acid amplification technology   to   detect the SARS-CoV-2 RdRp nucleic acid segment. The analytical   sensitivity   (limit of detection) is 500 copies/swab.     A POSITIVE result is indicative of the presence of SARS-CoV-2 RNA;   clinical   correlation with patient history and other diagnostic information is   necessary to determine patient infection status.    A NEGATIVE result means that SARS-CoV-2 nucleic acids are not present   above   the limit of detection. A NEGATIVE result should be treated as   presumptive.   It does not rule out the possibility of COVID-19 and should not be   the sole   basis for treatment decisions. If COVID-19 is strongly suspected   based on   clinical and exposure history, re-testing using an alternate   molecular assay   should be considered.     This test is only for use under the Food and Drug Administration s   Emergency   Use Authorization (EUA).     Commercial kits are provided by [employer]. Performance   characteristics of the EUA have been independently verified by   [facility] Department of Pathology and Laboratory Medicine.   _________________________________________________________________   The authorized Fact Sheet for Healthcare Providers and the authorized   Fact   Sheet for Patients of the ID NOW COVID-19 are available on the FDA   website:    https://www.fda.gov/media/370981/download   https://www.fda.gov/media/623935/download             Sodium       140         WBC       6.12                                Significant Imaging: I have reviewed all pertinent imaging results/findings within the past 24 hours.

## 2022-10-17 NOTE — CARE UPDATE
10/17/22 0708   Patient Assessment/Suction   Level of Consciousness (AVPU) alert   All Lung Fields Breath Sounds clear   PRE-TX-O2   O2 Device (Oxygen Therapy) CPAP   $ Is the patient on Low Flow Oxygen? Yes   Oxygen Concentration (%) 32   SpO2 100 %   Pulse Oximetry Type Intermittent   $ Pulse Oximetry - Multiple Charge Pulse Oximetry - Multiple   Pulse 67   Resp 15   Aerosol Therapy   $ Aerosol Therapy Charges Aerosol Treatment   Daily Review of Necessity (SVN) completed   Respiratory Treatment Status (SVN) given   Treatment Route (SVN) mouthpiece   Patient Position (SVN) semi-Shetty's   Post Treatment Assessment (SVN) increased aeration   Signs of Intolerance (SVN) none   Breath Sounds Post-Respiratory Treatment   Post-treatment Heart Rate (beats/min) 68   Post-treatment Resp Rate (breaths/min) 15   Ready to Wean/Extubation Screen   FIO2<=50 (chart decimal) 0.32   Preset CPAP/BiPAP Settings   $ CPAP/BiPAP Daily Charge BiPAP/CPAP Daily   Education   $ Education Bronchodilator;15 min   Respiratory Evaluation   $ Care Plan Tech Time 15 min

## 2022-10-17 NOTE — ED PROVIDER NOTES
Encounter Date: 10/16/2022       History     Chief Complaint   Patient presents with    Shortness of Breath    Weakness     Pt reports to ED with weakness/SOB starting last night. Pt has a hx of COPD(as needed/at night). Pt in nad at triage, vss, in wheelchair.     Patient presents complaining of shortness of breath and weakness.  Patient states she started to feel increasing shortness of breath for the last couple days but worse last night and today.  Patient has a history of congestive heart failure and COPD.  Patient uses oxygen at home only at night however for the last day she has been using oxygen all the time.  She describes dyspnea on exertion.  She denies chest pain.    Review of patient's allergies indicates:   Allergen Reactions    Carvedilol Other (See Comments)     Bradycardia and syncope    Boniva [ibandronate]     Codeine     Hydralazine analogues     Iodinated contrast media Hives    Kionex [sodium polystyrene sulfonate] Diarrhea     From encounter 12/11/17 for diarrhea--not true allergy.    Lisinopril     Morphine      Past Medical History:   Diagnosis Date    Anemia due to multiple mechanisms 6/29/2018    Anemia, chronic disease 6/29/2018    Anemia, chronic renal failure, stage 3 (moderate) 6/29/2018    Anticoagulant long-term use     aspirin    Aortic aneurysm     Chest pain     CHF (congestive heart failure)     COPD (chronic obstructive pulmonary disease)     COPD with acute exacerbation 1/9/2015    Depression     Diabetes mellitus type II     no longer on any DM meds    DVT (deep venous thrombosis) 06/09/2018    Encounter for blood transfusion     GERD (gastroesophageal reflux disease)     GI bleed     Gout     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 8/7/2018    Hip arthritis 3/1/2016    Homocysteinemia 8/7/2018    Hyperlipidemia     Hypertension     Incontinent of urine     Normocytic normochromic anemia 6/29/2018    Oxygen dependent     use oxygen as needed    PE (pulmonary thromboembolism)  06/09/2018    Pneumonia of right lower lobe due to infectious organism 9/11/2017    Syncope     Thyroid disease     hypothyroid    Type 2 diabetes mellitus with stage 3 chronic kidney disease 1/18/2013    UTI (urinary tract infection)      Past Surgical History:   Procedure Laterality Date    APPENDECTOMY      CHOLECYSTECTOMY      COLONOSCOPY Left 10/29/2020    Procedure: COLONOSCOPY;  Surgeon: Singh Kee III, MD;  Location: [facility];  Service: Endoscopy;  Laterality: Left;    ESOPHAGOGASTRODUODENOSCOPY Left 10/26/2020    Procedure: EGD (ESOPHAGOGASTRODUODENOSCOPY);  Surgeon: Kareem Gramajo MD;  Location: [facility];  Service: Endoscopy;  Laterality: Left;    ESOPHAGOGASTRODUODENOSCOPY Left 10/28/2020    Procedure: EGD (ESOPHAGOGASTRODUODENOSCOPY);  Surgeon: Kareem Gramajo MD;  Location: [facility];  Service: Endoscopy;  Laterality: Left;    ESOPHAGOGASTRODUODENOSCOPY N/A 9/16/2021    Procedure: EGD (ESOPHAGOGASTRODUODENOSCOPY);  Surgeon: Kareem Gramajo MD;  Location: [facility];  Service: Endoscopy;  Laterality: N/A;    FRACTURE SURGERY      right hip     HERNIA REPAIR      groin    HYSTERECTOMY      INSERTION OF IMPLANTABLE LOOP RECORDER N/A 12/12/2018    Procedure: Insertion, Implantable Loop Recorder;  Surgeon: Ashok Herbert MD;  Location: [facility] CATH LAB;  Service: Cardiology;  Laterality: N/A;    PERCUTANEOUS PINNING OF HIP Left 3/23/2019    Procedure: PINNING, HIP, PERCUTANEOUS;  Surgeon: Jorje Hamlin MD;  Location: [facility] OR;  Service: Orthopedics;  Laterality: Left;    SMALL BOWEL ENTEROSCOPY N/A 10/29/2020    Procedure: ENTEROSCOPY;  Surgeon: Singh Kee III, MD;  Location: [facility];  Service: Endoscopy;  Laterality: N/A;    VARICOSE VEIN SURGERY       Family History   Problem Relation Age of Onset    Hypertension Mother     Heart disease Mother     Lung disease Father     Diabetes Sister     Diabetes Brother     Kidney disease Son     Breast cancer Neg Hx     Ovarian cancer Neg Hx       Social History     Tobacco Use    Smoking status: Former     Packs/day: 0.35     Years: 44.00     Pack years: 15.40     Types: Cigarettes     Start date: 1970    Smokeless tobacco: Never    Tobacco comments:     1/08/2015   Substance Use Topics    Alcohol use: No     Alcohol/week: 0.0 standard drinks    Drug use: No     Review of Systems   All other systems reviewed and are negative.    Physical Exam     Initial Vitals [10/16/22 1621]   BP Pulse Resp Temp SpO2   (!) 147/72 65 18 98.1 °F (36.7 °C) 96 %      MAP       --         Physical Exam    Nursing note and vitals reviewed.  Constitutional: No distress.   Pleasant, polite, elderly, frail   HENT:   Head: Normocephalic and atraumatic.   Mouth/Throat: Oropharynx is clear and moist.   Eyes: EOM are normal.   Neck: Neck supple.   Normal range of motion.  Cardiovascular:  Normal rate, regular rhythm, normal heart sounds and intact distal pulses.           Pulmonary/Chest: Breath sounds normal. No respiratory distress.   Abdominal: Abdomen is soft.   Musculoskeletal:         General: Normal range of motion.      Cervical back: Normal range of motion and neck supple.     Neurological: She is alert and oriented to person, place, and time. She has normal strength.   Skin: Skin is warm and dry. Capillary refill takes less than 2 seconds.   Psychiatric: She has a normal mood and affect. Her behavior is normal. Judgment and thought content normal.       ED Course   Procedures  Labs Reviewed   CBC W/ AUTO DIFFERENTIAL - Abnormal; Notable for the following components:       Result Value    RBC 2.82 (*)     Hemoglobin 9.1 (*)     Hematocrit 28.0 (*)     MCV 99 (*)     MCH 32.3 (*)     Immature Granulocytes 0.8 (*)     Immature Grans (Abs) 0.06 (*)     Lymph % 17.5 (*)     All other components within normal limits   COMPREHENSIVE METABOLIC PANEL - Abnormal; Notable for the following components:    BUN 42 (*)     Creatinine 1.8 (*)     Calcium 8.5 (*)     Total Protein 5.8  (*)     Albumin 2.9 (*)     eGFR 26.1 (*)     All other components within normal limits   B-TYPE NATRIURETIC PEPTIDE - Abnormal; Notable for the following components:    BNP 2,653 (*)     All other components within normal limits   TROPONIN I - Abnormal; Notable for the following components:    Troponin I 0.404 (*)     All other components within normal limits   CULTURE, BLOOD   CULTURE, BLOOD   LACTIC ACID, PLASMA          Imaging Results              X-ray Chest AP Portable (Final result)  Result time 10/16/22 19:02:06      Final result by Harjeet Mantilla MD (10/16/22 19:02:06)                   Narrative:    XR CHEST 1 VIEW    CLINICAL HISTORY:  89 years Female shortness of breath    COMPARISON: July 20, 2022    FINDINGS: Cardiac silhouette size is enlarged. Loop recorder projects over the left chest. Atherosclerotic calcification of the aorta. No alveolar infiltrate involving the right lung base concerning for pneumonia. No thorax or large pleural effusion. No acute osseous abnormality.    IMPRESSION:    Development of alveolar infiltrate at the right lung base concerning for pneumonia. Follow-up chest radiography is recommended.    Electronically signed by:  Harjeet Mantilla MD  10/16/2022 7:02 PM CDT Workstation: [id number]                                     Medications   sodium chloride 0.9% flush 10 mL (has no administration in time range)   levoFLOXacin 750 mg/150 mL IVPB 750 mg (has no administration in time range)   albuterol-ipratropium 2.5 mg-0.5 mg/3 mL nebulizer solution 3 mL (3 mLs Nebulization Given 10/16/22 1832)   predniSONE tablet 40 mg (40 mg Oral Given 10/16/22 1914)   aspirin tablet 325 mg (325 mg Oral Given 10/16/22 1914)   enoxaparin injection 60 mg (60 mg Subcutaneous Given 10/16/22 1914)     Medical Decision Making:   Initial Assessment:   No apparent distress  Differential Diagnosis:   Considerations include but are not limited to congestive heart failure, COPD exacerbation, pneumonia,  acute coronary syndrome, unstable angina, pulmonary embolism,  Independently Interpreted Test(s):   I have ordered and independently interpreted EKG Reading(s) - see summary below       <> Summary of EKG Reading(s): EKG with T-wave inversion in V3 V4.  There is no ST elevation  Clinical Tests:   Lab Tests: Reviewed and Ordered  Radiological Study: Ordered and Reviewed  Medical Tests: Reviewed and Ordered  ED Management:  In the emergency department patient does have elevated troponin of 0.4.  Patient has elevated BUN and creatinine which is chronic.  She has evidence of possible infiltrate on the right side.  Her BNP is elevated greater than 2000.  Patient has multiple reasons for shortness of breath including possible NSTEMI, pneumonia.  Also consider possibility of pulmonary embolism.  Patient not a good candidate for contrast with elevated creatinine.  She would benefit from V/Q scan.  Patient will be given Lovenox prophylactically secondary to elevated troponin and concern for history of pulmonary embolism.  Patient given Levaquin ordered from the emergency department secondary to the infiltrate.  She will be given IV fluids.  She does remain clinically stable.  She has been consulted Hospital Medicine.    Attending Critical Care:   Critical Care Times:   Direct Patient Care (initial evaluation, reassessments, and time considering the case)................................................................10 minutes.   Additional History from reviewing old medical records or taking additional history from the family, EMS, PCP, etc.......................10 minutes.   Ordering, Reviewing, and Interpreting Diagnostic Studies...............................................................................................................10 minutes.    Documentation..................................................................................................................................................................................5 minutes.   ==============================================================  · Total Critical Care Time - exclusive of procedural time: 35 minutes.  ==============================================================  Critical care was necessary to treat or prevent imminent or life-threatening deterioration of the following conditions:  NSTEMI, pneumonia, hypoxia.   Critical care was time spent personally by me on the following activities: obtaining history from patient or relative, examination of patient, review of x-rays / CT sent with the patient, ordering lab, x-rays, and/or EKG, development of treatment plan with patient or relative, ordering and performing treatments and interventions, interpretation of cardiac measurements and re-evaluation of patient's condition.   Critical Care Condition: potentially life-threatening                           Clinical Impression:   Final diagnoses:  [I21.4] NSTEMI (non-ST elevated myocardial infarction) (Primary)  [J18.9] Pneumonia due to infectious organism, unspecified laterality, unspecified part of lung        ED Disposition Condition    Admit Stable                Francis Penny MD  10/16/22 3339

## 2022-10-17 NOTE — CARE UPDATE
10/16/22 0680   Patient Assessment/Suction   Level of Consciousness (AVPU) alert   Respiratory Effort Unlabored   Expansion/Accessory Muscles/Retractions no use of accessory muscles   All Lung Fields Breath Sounds clear;wheezes, expiratory   Rhythm/Pattern, Respiratory no shortness of breath reported   Cough Frequency no cough   PRE-TX-O2   O2 Device (Oxygen Therapy) nasal cannula   Flow (L/min) 1   SpO2 96 %   Pulse Oximetry Type Intermittent   $ Pulse Oximetry - Multiple Charge Pulse Oximetry - Multiple   Pulse 75   Resp 15   Positioning   Head of Bed (HOB) Positioning HOB elevated;HOB at 30 degrees   Preset CPAP/BiPAP Settings   Mode Of Delivery CPAP   $ CPAP/BiPAP Daily Charge BiPAP/CPAP Daily   $ Initial CPAP/BiPAP Setup? Yes   $ Is patient using? Yes   Sized Appropriately? Yes   Equipment Type V60   Airway Device Type small full face mask   Humidifier not applicable   CPAP (cm H2O) 10   ITime (sec) 1   Patient CPAP/BiPAP Settings   CPAP/BIPAP ID rental   Education   $ Education BiPAP;15 min   Respiratory Evaluation   $ Care Plan Tech Time 15 min

## 2022-10-17 NOTE — PROGRESS NOTES
Pharmacist Renal Dose Adjustment Note    Blaire Cage is a 89 y.o. female being treated with the medication Lovenox    Patient Data:    Vital Signs (Most Recent):  Temp: 98.1 °F (36.7 °C) (10/16/22 1621)  Pulse: 66 (10/16/22 1830)  Resp: 18 (10/16/22 1830)  BP: (!) 147/72 (10/16/22 1621)  SpO2: 100 % (10/16/22 1830)   Vital Signs (72h Range):  Temp:  [98.1 °F (36.7 °C)]   Pulse:  [65-66]   Resp:  [18]   BP: (147)/(72)   SpO2:  [96 %-100 %]      Recent Labs   Lab 10/16/22  1734   CREATININE 1.8*     Serum creatinine: 1.8 mg/dL (H) 10/16/22 1734  Estimated creatinine clearance: 16.5 mL/min (A)    Medication:Lovenox dose: 40 mg frequency Q24H will be changed to medication:Lovenox dose:30 mg frequency:Q24H    Pharmacist's Name: Dario Benjamin  Pharmacist's Extension: [phone]

## 2022-10-17 NOTE — PROGRESS NOTES
Formerly [facility], [facility] Medicine  Progress Note    Patient Name: Blaire Cage  MRN: [id number]  Patient Class: OP- Observation   Admission Date: 10/16/2022  Length of Stay: 0 days  Attending Physician: Ashok Lopez MD  Primary Care Provider: Eli Edmonds MD        Subjective:     Principal Problem:Pneumonia        HPI:  No notes on file    Overview/Hospital Course:  10/17/2022  Ms Cage is feeling weak and continues to cough.       Interval History: Ms Cage continues to cough but is much less short of breath    Review of Systems   Constitutional:  Positive for activity change, diaphoresis and fatigue.   HENT: Negative.     Eyes: Negative.    Respiratory:  Positive for cough, chest tightness and shortness of breath.    Cardiovascular: Negative.    Gastrointestinal: Negative.    Endocrine: Positive for cold intolerance and heat intolerance.   Genitourinary: Negative.    Musculoskeletal:  Positive for arthralgias and myalgias.   Skin: Negative.    Allergic/Immunologic: Negative.    Neurological:  Positive for weakness.   Hematological: Negative.    Psychiatric/Behavioral:  The patient is nervous/anxious.    Objective:     Vital Signs (Most Recent):  Temp: 98.9 °F (37.2 °C) (10/17/22 1540)  Pulse: 74 (10/17/22 1540)  Resp: 16 (10/17/22 1540)  BP: 137/63 (10/17/22 1540)  SpO2: 98 % (10/17/22 1540) Vital Signs (24h Range):  Temp:  [97.7 °F (36.5 °C)-98.9 °F (37.2 °C)] 98.9 °F (37.2 °C)  Pulse:  [66-88] 74  Resp:  [15-18] 16  SpO2:  [96 %-100 %] 98 %  BP: (137-183)/(63-81) 137/63     Weight: 56.5 kg (124 lb 9 oz)  Body mass index is 22.06 kg/m².    Intake/Output Summary (Last 24 hours) at 10/17/2022 1725  Last data filed at 10/17/2022 1541  Gross per 24 hour   Intake 480 ml   Output 900 ml   Net -420 ml      Physical Exam  Vitals and nursing note reviewed.   Constitutional:       General: She is not in acute distress.  HENT:      Head: Normocephalic and atraumatic.      Nose: Nose normal.       Mouth/Throat:      Mouth: Mucous membranes are moist.   Eyes:      Extraocular Movements: Extraocular movements intact.      Pupils: Pupils are equal, round, and reactive to light.   Cardiovascular:      Rate and Rhythm: Normal rate and regular rhythm.   Pulmonary:      Effort: Pulmonary effort is normal.      Breath sounds: Rhonchi and rales present.      Comments: Right lung base  Abdominal:      General: Bowel sounds are normal.   Musculoskeletal:         General: Normal range of motion.      Cervical back: Normal range of motion and neck supple.   Skin:     General: Skin is warm.   Neurological:      Mental Status: She is alert and oriented to person, place, and time.   Psychiatric:      Comments: Somewhat anxious       Significant Labs: All pertinent labs within the past 24 hours have been reviewed.  Recent Lab Results  (Last 5 results in the past 24 hours)        10/17/22  1619   10/17/22  1208   10/17/22  0855   10/17/22  0506   10/17/22  0445        Albumin       2.8         Alkaline Phosphatase       70         ALT       21         Anion Gap       13         AST       29         Baso #       0.01         Basophil %       0.2         BILIRUBIN TOTAL       0.5  Comment: For infants and newborns, interpretation of results should be based  on gestational age, weight and in agreement with clinical  observations.    Premature Infant recommended reference ranges:  Up to 24 hours.............<8.0 mg/dL  Up to 48 hours............<12.0 mg/dL  3-5 days..................<15.0 mg/dL  6-29 days.................<15.0 mg/dL           BUN       41         Calcium       8.5         Chloride       107         CO2       20         Creatinine       1.7         Differential Method       Automated         eGFR       28.0         Eos #       0.0         Eosinophil %       0.0         Glucose       140         Gran # (ANC)       5.1         Gran %       83.9         Hematocrit       29.1         Hemoglobin       9.3          Immature Grans (Abs)       0.06  Comment: Mild elevation in immature granulocytes is non specific and   can be seen in a variety of conditions including stress response,   acute inflammation, trauma and pregnancy. Correlation with other   laboratory and clinical findings is essential.           Immature Granulocytes       1.0         Lactate, Jaime       0.7  Comment: Falsely low lactic acid results can be found in samples   containing >=13.0 mg/dL total bilirubin and/or >=3.5 mg/dL   direct bilirubin.           Lymph #       0.8         Lymph %       13.6         Magnesium       1.8         MCH       31.8         MCHC       32.0         MCV       100         Mono #       0.1         Mono %       1.3         MPV       11.4         nRBC       0         Phosphorus       4.1         Platelets       196         POC Glucose 157   171       154       Potassium       4.4         PROTEIN TOTAL       5.8         RBC       2.92         RDW       13.6         SARS-CoV-2 RNA, Amplification, Qual     Negative  Comment: This test utilizes isothermal nucleic acid amplification technology   to   detect the SARS-CoV-2 RdRp nucleic acid segment. The analytical   sensitivity   (limit of detection) is 500 copies/swab.     A POSITIVE result is indicative of the presence of SARS-CoV-2 RNA;   clinical   correlation with patient history and other diagnostic information is   necessary to determine patient infection status.    A NEGATIVE result means that SARS-CoV-2 nucleic acids are not present   above   the limit of detection. A NEGATIVE result should be treated as   presumptive.   It does not rule out the possibility of COVID-19 and should not be   the sole   basis for treatment decisions. If COVID-19 is strongly suspected   based on   clinical and exposure history, re-testing using an alternate   molecular assay   should be considered.     This test is only for use under the Food and Drug Administration s   Emergency   Use Authorization  (EUA).     Commercial kits are provided by [employer]. Performance   characteristics of the EUA have been independently verified by   [facility] Department of Pathology and Laboratory Medicine.   _________________________________________________________________   The authorized Fact Sheet for Healthcare Providers and the authorized   Fact   Sheet for Patients of the ID NOW COVID-19 are available on the FDA   website:   https://www.fda.gov/media/034500/download   https://www.fda.gov/media/590947/download             Sodium       140         WBC       6.12                                Significant Imaging: I have reviewed all pertinent imaging results/findings within the past 24 hours.      Assessment/Plan:      * Pneumonia  Continue antibiotics and respiratory treatments  She has reduced cough , sob and bateman        VTE Risk Mitigation (From admission, onward)         Ordered     IP VTE LOW RISK PATIENT  Once         10/16/22 2046     Place sequential compression device  Until discontinued         10/16/22 2046                Discharge Planning   ELYSSA:      Code Status: Full Code   Is the patient medically ready for discharge?:     Reason for patient still in hospital (select all that apply): Treatment and Consult recommendations  Discharge Plan A: Home with family                  Ashok Lopez MD  Department of Hospital Medicine   [facility]

## 2022-10-17 NOTE — CONSULTS
"Formerly [facility]  Adult Nutrition   Consult Note (Nutrition Education)     SUMMARY     Recommendations  RD has added Glucerna to meal trays to assist in meeting nutritional needs with intake is insufficient.    Goals:   Pt to meet 100% of her EEN/EPN and to understand diet for CHF to lower her BNP.    Nutrition Goal Status: goal met    Communication of RD Recs: other (comment)    Dietitian Rounds Brief  Consult for Diet Education for CHF: Pt declined education due to stating that she has followed a Low Sodium diet for years and doesn't need the education. She did accept the Glucerna offered to her to help meet her nutritional needs. Her LBM was 10/15/2022. Will follow prn.    Diet order:   Current Diet Order: Cardiac      Evaluation of Received Nutrient/Fluid Intake  Energy Calories Required: meeting needs  Protein Required: meeting needs  Fluid Required: meeting needs  Tolerance: tolerating     % Intake of Estimated Energy Needs: 75 - 100 %  % Meal Intake: 75 - 100 %      Intake/Output Summary (Last 24 hours) at 10/17/2022 1330  Last data filed at 10/17/2022 1205  Gross per 24 hour   Intake 240 ml   Output 900 ml   Net -660 ml        Anthropometrics  Temp: 98 °F (36.7 °C)  Height Method: Stated  Height: 5' 3" (160 cm)  Height (inches): 63 in  Weight Method: Bed Scale  Weight: 56.5 kg (124 lb 9 oz)  Weight (lb): 124.56 lb  Ideal Body Weight (IBW), Female: 115 lb  % Ideal Body Weight, Female (lb): 108.31 %  BMI (Calculated): 22.1  BMI Grade: 18.5-24.9 - normal       Estimated/Assessed Needs  Weight Used For Calorie Calculations: 56.5 kg (124 lb 9 oz)  Energy Calorie Requirements (kcal): 3034-5485 kcas/day (25-30 kcals/kg ABW)  Energy Need Method: Kcal/kg  Protein Requirements:  g/day (1.5-2 g/kg IBW) 0.8-0.9 g/day (42-48 g/kg IBW) for eGFR < 50 with diabetic neuropathy  Weight Used For Protein Calculations: 53 kg (116 lb 13.5 oz)     Estimated Fluid Requirement Method: RDA Method  RDA Method (mL): " 1413       Reason for Assessment  Reason For Assessment: consult  Diagnosis: other (see comments) (Pneumonia)  Relevant Medical History: Hypertension, Hyperlipidemia, Thyroid disease, hypothyroid, Encounter for blood transfusion, COPD (chronic obstructive pulmonary disease), COPD with acute exacerbation, Anticoagulant long-term use, aspirin, Hip arthritis, Pneumonia of right lower lobe due to infectious organism, PE (pulmonary thromboembolism), DVT (deep venous thrombosis), Anemia due to multiple mechanisms, Anemia, chronic disease, Normocytic normochromic anemia, Homocysteinemia, Heterozygous MTHFR mutation, CHF (congestive heart failure), Aortic aneurysm, Chest pain, Syncope, GERD (gastroesophageal reflux disease), GI bleed, Diabetes mellitus type II, no longer on any DM meds, UTI (urinary tract infection), Anemia, chronic renal failure, stage 3 (moderate), Type 2 diabetes mellitus with stage 3 chronic kidney disease, Incontinent of urine, Oxygen dependent, use oxygen as needed, Gout, Depression  Interdisciplinary Rounds: attended    Nutrition/Diet History  Spiritual, Cultural Beliefs, Mormon Practices, Values that Affect Care: no  Food Allergies: NKFA  Factors Affecting Nutritional Intake: None identified at this time    Nutrition Risk Screen  Nutrition Risk Screen: no indicators present     MST Score: 0  Have you recently lost weight without trying?: No  Weight loss score: 0  Have you been eating poorly because of a decreased appetite?: No  Appetite score: 0       Weight History:  Wt Readings from Last 5 Encounters:   10/16/22 56.5 kg (124 lb 9 oz)   10/12/22 57 kg (125 lb 9.6 oz)   09/23/22 56 kg (123 lb 6.4 oz)   09/14/22 55.8 kg (123 lb)   09/09/22 55.5 kg (122 lb 6.4 oz)        Lab/Procedures/Meds: Pertinent Labs/Meds Reviewed    Medications:Pertinent Medications Reviewed  Scheduled Meds:   albuterol-ipratropium  3 mL Nebulization TID WAKE    ascorbic acid (vitamin C)  500 mg Oral Daily    atorvastatin   40 mg Oral Daily    diltiaZEM  180 mg Oral Daily    furosemide (LASIX) injection  20 mg Intravenous Q12H    levothyroxine  88 mcg Oral Before breakfast    losartan  25 mg Oral Daily    montelukast  10 mg Oral QHS    sertraline  50 mg Oral Daily     Continuous Infusions:  PRN Meds:.dextrose 10%, dextrose 10%, glucagon (human recombinant), insulin aspart U-100, melatonin, ondansetron, sodium chloride 0.9%, sodium chloride 0.9%    Labs: Pertinent Labs Reviewed  Clinical Chemistry:  Recent Labs   Lab 10/17/22  0506      K 4.4      CO2 20*   *   BUN 41*   CREATININE 1.7*   CALCIUM 8.5*   PROT 5.8*   ALBUMIN 2.8*   BILITOT 0.5   ALKPHOS 70   AST 29   ALT 21   ANIONGAP 13   MG 1.8   PHOS 4.1     CBC:   Recent Labs   Lab 10/17/22  0506   WBC 6.12   RBC 2.92*   HGB 9.3*   HCT 29.1*      *   MCH 31.8*   MCHC 32.0     Cardiac Profile:  Recent Labs   Lab 10/16/22  1734 10/16/22  2121   BNP 2,653*  --    TROPONINI 0.404* 0.389*     Monitor and Evaluation  Food and Nutrient Intake: food and beverage intake, energy intake  Food and Nutrient Adminstration: diet order  Knowledge/Beliefs/Attitudes: food and nutrition knowledge/skill, beliefs and attitudes  Physical Activity and Function: nutrition-related ADLs and IADLs, factors affecting access to physical activity  Anthropometric Measurements: weight, weight change, body mass index  Biochemical Data, Medical Tests and Procedures: lipid profile, inflammatory profile, glucose/endocrine profile, gastrointestinal profile, electrolyte and renal panel  Nutrition-Focused Physical Findings: overall appearance     Nutrition Risk  Level of Risk/Frequency of Follow-up: low     Nutrition Follow-Up  RD Follow-up?: Yes      Clarisa De Los Santos, DODIE 10/17/2022 1:30 PM

## 2022-10-17 NOTE — PLAN OF CARE
Problem: Adult Inpatient Plan of Care  Goal: Plan of Care Review  Outcome: Ongoing, Progressing  Goal: Patient-Specific Goal (Individualized)  Outcome: Ongoing, Progressing  Goal: Absence of Hospital-Acquired Illness or Injury  Outcome: Ongoing, Progressing  Goal: Optimal Comfort and Wellbeing  Outcome: Ongoing, Progressing  Goal: Readiness for Transition of Care  Outcome: Ongoing, Progressing     Problem: Diabetes Comorbidity  Goal: Blood Glucose Level Within Targeted Range  Outcome: Ongoing, Progressing     Problem: Fluid and Electrolyte Imbalance (Acute Kidney Injury/Impairment)  Goal: Fluid and Electrolyte Balance  Outcome: Ongoing, Progressing     Problem: Oral Intake Inadequate (Acute Kidney Injury/Impairment)  Goal: Optimal Nutrition Intake  Outcome: Ongoing, Progressing     Problem: Renal Function Impairment (Acute Kidney Injury/Impairment)  Goal: Effective Renal Function  Outcome: Ongoing, Progressing     Problem: Fluid Imbalance (Pneumonia)  Goal: Fluid Balance  Outcome: Ongoing, Progressing     Problem: Infection (Pneumonia)  Goal: Resolution of Infection Signs and Symptoms  Outcome: Ongoing, Progressing     Problem: Respiratory Compromise (Pneumonia)  Goal: Effective Oxygenation and Ventilation  Outcome: Ongoing, Progressing     Problem: Fall Injury Risk  Goal: Absence of Fall and Fall-Related Injury  Outcome: Ongoing, Progressing

## 2022-10-17 NOTE — PLAN OF CARE
Met with patient at bedside to complete initial assessment.    [facility]  Initial Discharge Assessment       Primary Care Provider: Eli Edmonds MD    Admission Diagnosis: NSTEMI (non-ST elevated myocardial infarction) [I21.4]    Admission Date: 10/16/2022  Expected Discharge Date:     Discharge Barriers Identified: (P) None    Payor: [employer]ICARE / Plan: [employer] CHOICES 65 / Product Type: Medicare Advantage /     Extended Emergency Contact Information  Primary Emergency Contact: Annabella Guerra  Address: [address]           [place] [address] [place]  Home Phone: [phone]  Mobile Phone: [phone]  Relation: Daughter  Preferred language: English   needed? No  Secondary Emergency Contact: Toya Hendrickson  Address: [address] [place] [address] [place]  Home Phone: [phone]  Mobile Phone: [phone]  Relation: Daughter  Preferred language: English   needed? No    Discharge Plan A: (P) Home with family  Discharge Plan B: (P) Home with family      [facility] - [place], [place] - [address]  [address]  [place] [address]  Phone: [phone] Fax: [phone]    [facility]  [address]  [place] [address]  Phone: [phone] Fax: [phone]    [facility] - [place], [place] - [address]  [address] [place] [address]  Phone: [phone] Fax: [phone]      Initial Assessment (most recent)       Adult Discharge Assessment - 10/17/22 1240          Discharge Assessment    Assessment Type Discharge Planning Assessment (P)      Confirmed/corrected address, phone number and insurance Yes (P)      Confirmed Demographics Correct on Facesheet (P)      Source of Information patient (P)      When was your last doctors appointment? -- (P)    unknown    Does patient/caregiver understand observation status Yes (P)       Communicated ELYSSA with patient/caregiver No (P)      Reason For Admission pneumonia (P)      Lives With child(shreyas), adult (P)      Facility Arrived From: Home, daughter's home (P)      Do you expect to return to your current living situation? Yes (P)      Do you have help at home or someone to help you manage your care at home? Yes (P)      Who are your caregiver(s) and their phone number(s)? Annabella Guerra (Daughter)   [phone] (Mobile) (P)      Prior to hospitilization cognitive status: Unable to Assess (P)      Current cognitive status: Alert/Oriented (P)      Walking or Climbing Stairs Difficulty ambulation difficulty, requires equipment;ambulation difficulty, assistance 1 person;transferring difficulty, assistance 1 person;transferring difficulty, requires equipment (P)      Mobility Management walker, daughter assists (P)      Dressing/Bathing Difficulty bathing difficulty, assistance 1 person (P)      Dressing/Bathing Management daughter assists (P)      Equipment Currently Used at Home oxygen;walker, rolling;wheelchair;shower chair;nebulizer (P)      Readmission within 30 days? No (P)      Patient currently being followed by outpatient case management? No (P)      Do you currently have service(s) that help you manage your care at home? No (P)      Do you take prescription medications? Yes (P)      Do you have prescription coverage? Yes (P)      Coverage [facility] (P)      Do you have any problems affording any of your prescribed medications? No (P)      Is the patient taking medications as prescribed? yes (P)      Who is going to help you get home at discharge? Annabella Guerra (Daughter)   [phone] (Mobile) (P)      How do you get to doctors appointments? family or friend will provide (P)      Are you on dialysis? No (P)      Do you take coumadin? No (P)      Discharge Plan A Home with family (P)      Discharge Plan B Home with family (P)      DME Needed Upon Discharge  none (P)      Discharge  Plan discussed with: Patient (P)      Discharge Barriers Identified None (P)         Relationship/Environment    Name(s) of Who Lives With Patient Annabella Guerra (Daughter)   [phone] (Mobile) (P)

## 2022-10-17 NOTE — PLAN OF CARE
Met with patient at bedside, explained Medicare Outpatient Observation Notice (MOON), and left Q&A information sheet at bedside. MOON form scanned into media manager.       10/17/22 1239   GOLDEN Message   Medicare Outpatient and Observation Notification regarding financial responsibility Given to patient/caregiver;Explained to patient/caregiver;Signed/date by patient/caregiver   Date GOLDEN was signed 10/17/22   Time GOLDEN was signed 0564

## 2022-10-17 NOTE — H&P
[facility] Medicine History & Physical Examination   Patient Name: Blaire Cage  MRN: [id number]  Patient Class: Emergency   Admission Date: 10/16/2022  4:17 PM  Length of Stay: 0  Attending Physician:   Primary Care Provider: Eli Edmonds MD  Face-to-Face encounter date: 10/16/2022  Code Status: Full Code  MPOA:  Chief Complaint: Shortness of Breath and Weakness (Pt reports to ED with weakness/SOB starting last night. Pt has a hx of COPD(as needed/at night). Pt in nad at triage, vss, in wheelchair.)        Patient information was obtained from patient, past medical records and ER records.   HISTORY OF PRESENT ILLNESS:   Blaire Cage is a 89 y.o. old female who  has a past medical history of Anemia due to multiple mechanisms (6/29/2018), Anemia, chronic disease (6/29/2018), Anemia, chronic renal failure, stage 3 (moderate) (6/29/2018), Anticoagulant long-term use, Aortic aneurysm, Chest pain, CHF (congestive heart failure), COPD (chronic obstructive pulmonary disease), COPD with acute exacerbation (1/9/2015), Depression, Diabetes mellitus type II, DVT (deep venous thrombosis) (06/09/2018), Encounter for blood transfusion, GERD (gastroesophageal reflux disease), GI bleed, Gout, 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 (8/7/2018), Hip arthritis (3/1/2016), Homocysteinemia (8/7/2018), Hyperlipidemia, Hypertension, Incontinent of urine, Normocytic normochromic anemia (6/29/2018), Oxygen dependent, PE (pulmonary thromboembolism) (06/09/2018), Pneumonia of right lower lobe due to infectious organism (9/11/2017), Syncope, Thyroid disease, Type 2 diabetes mellitus with stage 3 chronic kidney disease (1/18/2013), and UTI (urinary tract infection).. The patient presented to [facility] on 10/16/2022 with a primary complaint of Shortness of Breath and Weakness (Pt reports to ED with weakness/SOB starting last night. Pt has a hx of COPD(as needed/at night). Pt in nad at triage, vss, in  wheelchair.)  .     Very pleasant 89-year-old female presents to the emergency room with shortness of breath.  The patient states she has been having shortness of breath and weakness for the past 2 days.  Patient states last night shortness of breath got progressively worse.  It she also endorses generalized weakness a cough and fatigue.  She describes her cough as productive in nature and yellow in color.  She denies fever chills hematemesis hemoptysis lightheadedness dizziness or syncope.      The patient does use oxygen at home at night she is on CPAP.  She has been using her oxygen more secondary to dyspnea with exertion.  She denied actual chest pain lightheadedness dizziness palpitations or syncope    She describes her symptoms as moderate to severe severity with no exacerbating or alleviating factors      Past medical history is significant for chronic kidney disease stage 3, hypertension, aortic aneurysm, CHF, COPD, type 2 diabetes, depression, arthritis, hyperlipidemia, normocytic anemia, oxygen dependent, pulmonary in emboli on 06/09/2018, thyroid disease.    In the emergency room she was found to have pneumonia and was started with a broad-spectrum antibiotic and DuoNeb treatments.  She was also found to have a component of heart failure and given IV Lasix.  We will monitor her output and intake closely and consult her cardiologist for further recommendations  REVIEW OF SYSTEMS:   10 Point Review of System was performed and was found to be negative except for that mentioned already in the HPI and   Review of Systems (Negative unless checked off)  Review of Systems   Constitutional:  Positive for chills and malaise/fatigue.   HENT: Negative.     Eyes: Negative.    Respiratory:  Positive for cough, sputum production and shortness of breath.    Cardiovascular:  Positive for leg swelling.   Gastrointestinal: Negative.    Genitourinary: Negative.    Musculoskeletal: Negative.    Skin: Negative.     Neurological:  Positive for weakness.   Endo/Heme/Allergies: Negative.    Psychiatric/Behavioral: Negative.           PAST MEDICAL HISTORY:     Past Medical History:   Diagnosis Date    Anemia due to multiple mechanisms 6/29/2018    Anemia, chronic disease 6/29/2018    Anemia, chronic renal failure, stage 3 (moderate) 6/29/2018    Anticoagulant long-term use     aspirin    Aortic aneurysm     Chest pain     CHF (congestive heart failure)     COPD (chronic obstructive pulmonary disease)     COPD with acute exacerbation 1/9/2015    Depression     Diabetes mellitus type II     no longer on any DM meds    DVT (deep venous thrombosis) 06/09/2018    Encounter for blood transfusion     GERD (gastroesophageal reflux disease)     GI bleed     Gout     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 8/7/2018    Hip arthritis 3/1/2016    Homocysteinemia 8/7/2018    Hyperlipidemia     Hypertension     Incontinent of urine     Normocytic normochromic anemia 6/29/2018    Oxygen dependent     use oxygen as needed    PE (pulmonary thromboembolism) 06/09/2018    Pneumonia of right lower lobe due to infectious organism 9/11/2017    Syncope     Thyroid disease     hypothyroid    Type 2 diabetes mellitus with stage 3 chronic kidney disease 1/18/2013    UTI (urinary tract infection)        PAST SURGICAL HISTORY:     Past Surgical History:   Procedure Laterality Date    APPENDECTOMY      CHOLECYSTECTOMY      COLONOSCOPY Left 10/29/2020    Procedure: COLONOSCOPY;  Surgeon: Singh Kee III, MD;  Location: [facility];  Service: Endoscopy;  Laterality: Left;    ESOPHAGOGASTRODUODENOSCOPY Left 10/26/2020    Procedure: EGD (ESOPHAGOGASTRODUODENOSCOPY);  Surgeon: Kareem Gramajo MD;  Location: [facility];  Service: Endoscopy;  Laterality: Left;    ESOPHAGOGASTRODUODENOSCOPY Left 10/28/2020    Procedure: EGD (ESOPHAGOGASTRODUODENOSCOPY);  Surgeon: Kareem Gramajo MD;  Location: [facility];  Service: Endoscopy;  Laterality: Left;     ESOPHAGOGASTRODUODENOSCOPY N/A 9/16/2021    Procedure: EGD (ESOPHAGOGASTRODUODENOSCOPY);  Surgeon: Kareem Gramajo MD;  Location: [facility] ENDO;  Service: Endoscopy;  Laterality: N/A;    FRACTURE SURGERY      right hip     HERNIA REPAIR      groin    HYSTERECTOMY      INSERTION OF IMPLANTABLE LOOP RECORDER N/A 12/12/2018    Procedure: Insertion, Implantable Loop Recorder;  Surgeon: Ashok Herbert MD;  Location: [facility] CATH LAB;  Service: Cardiology;  Laterality: N/A;    PERCUTANEOUS PINNING OF HIP Left 3/23/2019    Procedure: PINNING, HIP, PERCUTANEOUS;  Surgeon: Jorje Hamlin MD;  Location: [facility] OR;  Service: Orthopedics;  Laterality: Left;    SMALL BOWEL ENTEROSCOPY N/A 10/29/2020    Procedure: ENTEROSCOPY;  Surgeon: Singh Kee III, MD;  Location: [facility] ENDO;  Service: Endoscopy;  Laterality: N/A;    VARICOSE VEIN SURGERY         ALLERGIES:   Carvedilol, Boniva [ibandronate], Codeine, Hydralazine analogues, Iodinated contrast media, Kionex [sodium polystyrene sulfonate], Lisinopril, and Morphine    FAMILY HISTORY:     Family History   Problem Relation Age of Onset    Hypertension Mother     Heart disease Mother     Lung disease Father     Diabetes Sister     Diabetes Brother     Kidney disease Son     Breast cancer Neg Hx     Ovarian cancer Neg Hx        SOCIAL HISTORY:     Social History     Tobacco Use    Smoking status: Former     Packs/day: 0.35     Years: 44.00     Pack years: 15.40     Types: Cigarettes     Start date: 1970    Smokeless tobacco: Never    Tobacco comments:     1/08/2015   Substance Use Topics    Alcohol use: No     Alcohol/week: 0.0 standard drinks        Social History     Substance and Sexual Activity   Sexual Activity Not Currently        HOME MEDICATIONS:     Prior to Admission medications    Medication Sig Start Date End Date Taking? Authorizing Provider   albuterol (PROVENTIL) 2.5 mg /3 mL (0.083 %) nebulizer solution Take 3 mLs (2.5 mg total) by nebulization every 6 (six)  hours as needed for Wheezing or Shortness of Breath. USE 1 VIAL IN NEBULIZER EVERY 6 HOURS AS NEEDED FOR WHEEZING. RESCUE 8/10/22  Yes Eli Edmonds MD   allopurinoL (ZYLOPRIM) 100 MG tablet Take 1 tablet (100 mg total) by mouth once daily. 8/10/22  Yes Eli Edmonds MD   ascorbic acid, vitamin C, (VITAMIN C) 500 MG tablet Take 500 mg by mouth once daily.   Yes Historical Provider   calcium carbonate (OS-TASIA) 500 mg calcium (1,250 mg) tablet Take 1 tablet by mouth 2 (two) times daily.   Yes Historical Provider   diltiaZEM (CARDIZEM CD) 180 MG 24 hr capsule Take 1 capsule (180 mg total) by mouth once daily. 3/24/22  Yes Malik Fu MD   fish oil-omega-3 fatty acids 300-1,000 mg capsule Take 2 g by mouth every evening.   Yes Historical Provider   fluticasone-umeclidin-vilanter (TRELEGY ELLIPTA) 200-62.5-25 mcg inhaler Inhale 1 puff into the lungs once daily. 2/2/22  Yes Sadie Loredo MD   folic acid-vit B6-vit B12 2.5-25-2 mg (FOLBIC) 2.5-25-2 mg Tab Take 1 tablet by mouth once daily.   Yes Historical Provider   furosemide (LASIX) 20 MG tablet Take 1 tablet only as needed, for swelling, increase SOB, weight gain of 3 lbs overnight or 5 lbs for the week 8/10/22  Yes Eli Edmonds MD   levothyroxine (SYNTHROID) 88 MCG tablet Take 1 tablet (88 mcg total) by mouth once daily. 8/10/22  Yes Eli Edmonds MD   losartan (COZAAR) 25 MG tablet Take 1 tablet (25 mg total) by mouth once daily.  Patient taking differently: Take 25 mg by mouth once daily. prn 8/10/22 8/10/23 Yes Eli Edmonds MD   mepolizumab 100 mg/mL AtIn Inject 1 mL (100 mg total) into the skin every 28 days. 5/4/22  Yes Sadie Loredo MD   montelukast (SINGULAIR) 10 mg tablet TAKE 1 TABLET BY MOUTH ONCE DAILY IN THE EVENING  Patient taking differently: Take 10 mg by mouth every evening. 6/30/22  Yes Eli Edmonds MD   multivitamin (THERAGRAN) tablet Take 1 tablet by mouth once daily. 10/5/18  Yes JUAN C Qureshi   omeprazole  (PRILOSEC) 40 MG capsule Take 1 capsule (40 mg total) by mouth once daily. 8/10/22  Yes Eli Edmonds MD   rosuvastatin (CRESTOR) 20 MG tablet Take 1 tablet (20 mg total) by mouth once daily. 8/10/22 8/10/23 Yes Eli Edmonds MD   sertraline (ZOLOFT) 50 MG tablet Take 1 tablet (50 mg total) by mouth once daily. 9/19/22  Yes Eli Edmonds MD   simethicone 180 mg Cap Take 1 capsule by mouth 3 (three) times daily as needed. 7/12/22  Yes Historical Provider   acetaminophen (TYLENOL) 325 MG tablet Take 325 mg by mouth every 6 (six) hours as needed for Pain.    Historical Provider   ciclopirox (PENLAC) 8 % Soln Apply topically nightly. 4/14/22 7/13/22  Winnie Banks NP   ferrous sulfate (FEOSOL) 325 mg (65 mg iron) Tab tablet Take 325 mg by mouth once daily.    Historical Provider   polyethylene glycol (GLYCOLAX) 17 gram/dose powder Take 17 g by mouth 2 (two) times daily. 7/23/22   Cristal Car MD   clobetasol 0.05% (TEMOVATE) 0.05 % Oint Apply topically 2 (two) times daily. 4/22/22 7/20/22  Consuelo Tam MD   diclofenac sodium (VOLTAREN) 1 % Gel Apply 2 g topically once daily.  Patient taking differently: Apply 2 g topically as needed. 5/24/20 7/20/22  Eli Edmonds MD   ketoconazole (NIZORAL) 2 % shampoo Apply topically twice a week.  Patient taking differently: Apply 1 application topically twice a week. 5/20/21 3/26/22  Candace Valles MD   meclizine (ANTIVERT) 25 mg tablet Take 1 tablet (25 mg total) by mouth 3 (three) times daily as needed. 11/9/20 7/20/22  Eli Edmonds MD   nitroGLYCERIN (NITROSTAT) 0.4 MG SL tablet Place 1 tablet (0.4 mg total) under the tongue every 5 (five) minutes as needed for Chest pain. As needed  Patient taking differently: Place 0.4 mg under the tongue every 5 (five) minutes as needed for Chest pain. Seek medical help if pain is not relieved after the third dose. 2/27/18 7/20/22  Alfredito Trevino MD   nystatin (MYCOSTATIN) powder Apply topically 4 (four) times  "daily. 4/8/22 7/20/22  Anne Arcos NP         PHYSICAL EXAM:   BP (!) 147/72   Pulse 66   Temp 98.1 °F (36.7 °C)   Resp 18   Ht 5' 3" (1.6 m)   Wt 57.6 kg (127 lb)   LMP  (LMP Unknown)   SpO2 100%   BMI 22.50 kg/m²   Vitals Reviewed  General appearance:  Frail female in no apparent distress.  Skin: No Rash.  Poor turgor  Neuro: Motor and sensory exams grossly intact. Good tone. Power in all 4 extremities 5/5.   HENT: Atraumatic head. Moist mucous membranes of oral cavity.  Eyes: Normal extraocular movements.   Neck: Supple. No evidence of lymphadenopathy. No thyroidomegaly.  Lungs: Clear to auscultation bilaterally. No wheezing present.   Heart: Regular rate and rhythm. S1 and S2 present with positive murmurs/no gallop/no rub.  positive pedal edema.  Positive JVD present.   Abdomen: Soft, non-distended, non-tender. No rebound tenderness/guarding. No masses or organomegaly. Bowel sounds are normal. Bladder is not palpable.   Extremities: No cyanosis, clubbing, or edema.  Psych/mental status: Alert and oriented. Cooperative. Responds appropriately to questions.   EMERGENCY DEPARTMENT LABS AND IMAGING:   Following labs were Reviewed   Recent Labs   Lab 10/16/22  1734   WBC 8.00   HGB 9.1*   HCT 28.0*      CALCIUM 8.5*   ALBUMIN 2.9*   PROT 5.8*      K 3.8   CO2 24      BUN 42*   CREATININE 1.8*   ALKPHOS 72   ALT 22   AST 27   BILITOT 0.3         BMP:   Recent Labs   Lab 10/16/22  1734   GLU 98      K 3.8      CO2 24   BUN 42*   CREATININE 1.8*   CALCIUM 8.5*   , CMP   Recent Labs   Lab 10/16/22  1734      K 3.8      CO2 24   GLU 98   BUN 42*   CREATININE 1.8*   CALCIUM 8.5*   PROT 5.8*   ALBUMIN 2.9*   BILITOT 0.3   ALKPHOS 72   AST 27   ALT 22   ANIONGAP 8   , CBC   Recent Labs   Lab 10/16/22  1734   WBC 8.00   HGB 9.1*   HCT 28.0*      , INR   Lab Results   Component Value Date    INR 1.4 10/19/2021    INR 1.6 09/21/2021    INR 1.3 08/03/2021   , Lipid " Panel   Lab Results   Component Value Date    CHOL 207 (H) 12/09/2020    HDL 69 12/09/2020    LDLCALC 111.6 12/09/2020    TRIG 132 12/09/2020    CHOLHDL 33.3 12/09/2020   , Troponin   Recent Labs   Lab 10/16/22  1734   TROPONINI 0.404*   , A1C:   Recent Labs   Lab 04/22/22  1130 10/06/22  1404   HGBA1C 5.7* 6.0   , and All labs within the past 24 hours have been reviewed  Microbiology Results (last 7 days)       Procedure Component Value Units Date/Time    Blood culture x two cultures. Draw prior to antibiotics. [[id number]] Collected: 10/16/22 1945    Order Status: Sent Specimen: Blood from Peripheral, Antecubital, Right Updated: 10/16/22 2004    Blood culture x two cultures. Draw prior to antibiotics. [[id number]] Collected: 10/16/22 1840    Order Status: Sent Specimen: Blood from Peripheral, Hand, Right Updated: 10/16/22 2004          X-ray Chest AP Portable   Final Result        X-ray Chest AP Portable    Result Date: 10/16/2022  XR CHEST 1 VIEW CLINICAL HISTORY: 89 years Female shortness of breath COMPARISON: July 20, 2022 FINDINGS: Cardiac silhouette size is enlarged. Loop recorder projects over the left chest. Atherosclerotic calcification of the aorta. No alveolar infiltrate involving the right lung base concerning for pneumonia. No thorax or large pleural effusion. No acute osseous abnormality. IMPRESSION: Development of alveolar infiltrate at the right lung base concerning for pneumonia. Follow-up chest radiography is recommended. Electronically signed by:  Harjeet Mantilla MD  10/16/2022 7:02 PM CDT Workstation: [id number]        I personally reviewed and agree with the radiologist's findings      12 lead EKG reveals a sinus rhythm with a normal axis this poor R-wave progression this ST flattening throughout this some T-wave inversions in the anterior septal leads rate 69  milliseconds    ASSESSMENT & PLAN:   Blaire Cage is a 89 y.o. female admitted for    Bilateral Lobe Pneumonia  - IV Levaquin  for now  - Duo-neb treatments    2. NSTEMI  - trend  -ASA/statin/BB/ prn nitrates      3.CKD stage IV  - appears stable  - renal dose all meds  -avoidance of nephrotoxic meds    4. 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 Obstructive sleep apnea  -CPAP at night use home settings    6. Hypothyroidism  -continue levothyroxine    7. Hyperlipidemia  -continue statin    DVT Prophylaxis: will be placed on Lovenox for DVT prophylaxis and will be advised to be as mobile as possible and sit in a chair as tolerated.   ________________________________________________________________  Face-to-Face encounter date: 10/16/2022  Encounter included review of the medical records, interviewing and examining the patient face-to-face, discussion with family and other health care providers including emergency medicine physician, admission orders, interpreting lab/test results and formulating a plan of care.   Medical Decision Making during this encounter was  [_] Low Complexity  [_] Moderate Complexity  [x] High Complexity  _________________________________________________________________________________    INPATIENT LIST OF MEDICATIONS     Current Facility-Administered Medications:     levoFLOXacin 750 mg/150 mL IVPB 750 mg, 750 mg, Intravenous, ED 1 Time, Francis Penny MD, Last Rate: 100 mL/hr at 10/16/22 2029, 750 mg at 10/16/22 2029    sodium chloride 0.9% flush 10 mL, 10 mL, Intravenous, PRN, Francis Penny MD    Current Outpatient Medications:     albuterol (PROVENTIL) 2.5 mg /3 mL (0.083 %) nebulizer solution, Take 3 mLs (2.5 mg total) by nebulization every 6 (six) hours as needed for Wheezing or Shortness of Breath. USE 1 VIAL IN NEBULIZER EVERY 6 HOURS AS NEEDED FOR WHEEZING. RESCUE, Disp: 360 each, Rfl: 3    allopurinoL (ZYLOPRIM) 100 MG tablet, Take 1 tablet (100 mg total) by mouth once daily., Disp: 90 tablet, Rfl: 3    ascorbic acid, vitamin C,  (VITAMIN C) 500 MG tablet, Take 500 mg by mouth once daily., Disp: , Rfl:     calcium carbonate (OS-TASIA) 500 mg calcium (1,250 mg) tablet, Take 1 tablet by mouth 2 (two) times daily., Disp: , Rfl:     diltiaZEM (CARDIZEM CD) 180 MG 24 hr capsule, Take 1 capsule (180 mg total) by mouth once daily., Disp: 90 capsule, Rfl: 3    fish oil-omega-3 fatty acids 300-1,000 mg capsule, Take 2 g by mouth every evening., Disp: , Rfl:     fluticasone-umeclidin-vilanter (TRELEGY ELLIPTA) 200-62.5-25 mcg inhaler, Inhale 1 puff into the lungs once daily., Disp: 60 each, Rfl: 11    folic acid-vit B6-vit B12 2.5-25-2 mg (FOLBIC) 2.5-25-2 mg Tab, Take 1 tablet by mouth once daily., Disp: , Rfl:     furosemide (LASIX) 20 MG tablet, Take 1 tablet only as needed, for swelling, increase SOB, weight gain of 3 lbs overnight or 5 lbs for the week, Disp: 30 tablet, Rfl: prn    levothyroxine (SYNTHROID) 88 MCG tablet, Take 1 tablet (88 mcg total) by mouth once daily., Disp: 90 tablet, Rfl: 3    losartan (COZAAR) 25 MG tablet, Take 1 tablet (25 mg total) by mouth once daily. (Patient taking differently: Take 25 mg by mouth once daily. prn), Disp: 90 tablet, Rfl: 3    mepolizumab 100 mg/mL AtIn, Inject 1 mL (100 mg total) into the skin every 28 days., Disp: 1 mL, Rfl: 12    montelukast (SINGULAIR) 10 mg tablet, TAKE 1 TABLET BY MOUTH ONCE DAILY IN THE EVENING (Patient taking differently: Take 10 mg by mouth every evening.), Disp: 90 tablet, Rfl: 3    multivitamin (THERAGRAN) tablet, Take 1 tablet by mouth once daily., Disp: , Rfl:     omeprazole (PRILOSEC) 40 MG capsule, Take 1 capsule (40 mg total) by mouth once daily., Disp: 90 capsule, Rfl: 3    rosuvastatin (CRESTOR) 20 MG tablet, Take 1 tablet (20 mg total) by mouth once daily., Disp: 90 tablet, Rfl: 3    sertraline (ZOLOFT) 50 MG tablet, Take 1 tablet (50 mg total) by mouth once daily., Disp: 90 tablet, Rfl: 1    simethicone 180 mg Cap, Take 1 capsule by mouth 3 (three) times daily as  needed., Disp: , Rfl:     acetaminophen (TYLENOL) 325 MG tablet, Take 325 mg by mouth every 6 (six) hours as needed for Pain., Disp: , Rfl:     ciclopirox (PENLAC) 8 % Soln, Apply topically nightly., Disp: 6.6 mL, Rfl: 3    ferrous sulfate (FEOSOL) 325 mg (65 mg iron) Tab tablet, Take 325 mg by mouth once daily., Disp: , Rfl:     polyethylene glycol (GLYCOLAX) 17 gram/dose powder, Take 17 g by mouth 2 (two) times daily., Disp: 850 g, Rfl: 3      Scheduled Meds:   levoFLOXacin  750 mg Intravenous ED 1 Time     Continuous Infusions:  PRN Meds:.sodium chloride 0.9%      Rika Vasquez  [facility] Hospitalist NP  10/16/2022

## 2022-10-18 NOTE — PLAN OF CARE
"POC reviewed. VSS, afebrile. Pt transferred to ICU step down on cont Bipap. Pt later placed on oxymask then NC around 1215. Sats remained appropriate throughout day. Has occasional coughing episodes that cause pt anxiety/tachypnea; easily "talked down" and reassurance provided that coughing is good. Pt "just feels scared." MD notified, low dose ativan ordered prn. Echo completed. Cardiology consult placed for significant changes. Lasix admin; Purewick in place with low but adequate UOP. Tolerating diet/glucerna shakes. Generalized bruising noted. Family at bedside throughout day. Comfort promoted, safety mainainted.    Problem: Adult Inpatient Plan of Care  Goal: Plan of Care Review  Outcome: Ongoing, Progressing     "

## 2022-10-18 NOTE — PROGRESS NOTES
Pharmacist Renal Dose Adjustment Note    Blaire Cage is a 89 y.o. female being treated with the medication enoxaparin    Patient Data:    Vital Signs (Most Recent):  Temp: 97.5 °F (36.4 °C) (10/18/22 0845)  Pulse: 73 (10/18/22 1015)  Resp: (!) 23 (10/18/22 1015)  BP: (!) 154/68 (10/18/22 1000)  SpO2: 100 % (10/18/22 1015)   Vital Signs (72h Range):  Temp:  [97.4 °F (36.3 °C)-98.9 °F (37.2 °C)]   Pulse:  [65-98]   Resp:  [15-40]   BP: (137-183)/(63-81)   SpO2:  [67 %-100 %]      Recent Labs   Lab 10/16/22  1734 10/17/22  0506   CREATININE 1.8* 1.7*     Serum creatinine: 1.7 mg/dL (H) 10/17/22 0506  Estimated creatinine clearance: 17.5 mL/min (A)    Medication:enoxaparin 1 mg/kg Q 12 hours will be changed to Q 24 hours for crcl < 30 ml/min     Pharmacist's Name: Niko Lua  Pharmacist's Extension: [phone]

## 2022-10-18 NOTE — CARE UPDATE
10/18/22 0843   Patient Assessment/Suction   Level of Consciousness (AVPU) alert   Respiratory Effort Normal;Unlabored   Expansion/Accessory Muscles/Retractions expansion symmetric   PRE-TX-O2   O2 Device (Oxygen Therapy) BiPAP   Oxygen Concentration (%) 40   SpO2 100 %   Pulse 91   Ready to Wean/Extubation Screen   FIO2<=50 (chart decimal) 0.4   Preset CPAP/BiPAP Settings   Mode Of Delivery BiPAP   $ Is patient using? Yes   Size of Mask Small   Sized Appropriately? Yes   Equipment Type V60   Airway Device Type small full face mask   Ipap 10   EPAP (cm H2O) 5   Pressure Support (cm H2O) 5   Set Rate (Breaths/Min) 16   ITime (sec) 0.9   Rise Time (sec) 3   Patient CPAP/BiPAP Settings   RR Total (Breaths/Min) 32   Tidal Volume (mL) 384   VE Minute Ventilation (L/min) 12.3 L/min   Peak Inspiratory Pressure (cm H2O) 12   TiTOT (%) 35   Total Leak (L/Min) 11   Patient Trigger - ST Mode Only (%) 86   Transport Patient   $ Transport Tech Time Charge 15 min   Time to transport 843   Oxygen Method BiPAP   Transport to CARDIO A 5845   Toleration Good   Ambu and mask at bedside Yes

## 2022-10-18 NOTE — CARE UPDATE
Admitted for acute hypoxic respiratory failure likely from right lower lobe pneumonia. Has known COPD with possible component of exacerbation. Also, concern for pulmonary edema (elevated BNP).     Receiving bronchodilators, diuretics and antibiotics.  Due to increased work of breathing she has been initiated on BiPAP last night.  Respiratory distress has improved however she is unable to tolerate coming off BiPA P. Patient will moved to progressive care unit for continuous BiPAP.  Ordered repeat chest x-ray.     Julian Joiner MD  Internal Medicine

## 2022-10-18 NOTE — NURSING
Offered scheduled lactulose enema to pt. Pt would like to try PO medications/options first. MD notified.

## 2022-10-18 NOTE — NURSING
Pt removed from Bipap, placed on oxymask. Sats 100%, do distress noticed, pt feels like breathing is better. Productive cough - cleared small amount yellow sputum. Tolerating sips of juice/water/po pills. Will keep oxymask on and continue to monitor.

## 2022-10-18 NOTE — CONSULTS
Pulmonary/Critical Care Consult      PATIENT NAME: Blaire Cage  MRN: [id number]  TODAY'S DATE: 10/18/2022  11:49 AM  ADMIT DATE: 10/16/2022  AGE: 89 y.o. : 1930    CONSULT REQUESTED BY: Joey Ramsey MD    REASON FOR CONSULT:   COPD exacerbation, CAP    HISTORY OF PRESENT ILLNESS   Blaire Cage is a 89 y.o. 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    The patient presented the day before yesterday with shortness of breath. She was found to have right lower lung opacities on CXR and very elevated BNP. She has been given diuresis, Abx, steroids, and bronchodilators. She was stepped up to the step-down unit on BiPAP, but this was quickly weaned off. The patient feels much better now.      SMOKING HISTORY  Current 3 cigarette/day smoker. No interest in quitting.    REVIEW OF SYSTEMS  GENERAL: Feeling well. No fevers, chills, or night sweats.  EYES: Vision is good.  ENT: No sinusitis or pharyngitis.   HEART: No chest pain or palpitations.  LUNGS: No cough, sputum, or wheezing.  GI: No abdominal pain, nausea, vomiting, or diarrhea.  : No dysuria, urgency, or frequency.  SKIN: No lesions or rashes.  MUSCULOSKELETAL: No joint pain or myalgias.  NEURO: No headaches or neuropathy.  LYMPH: No edema or adenopathy.  PSYCH: No anxiety or depression.  ENDO: No unexpected weight change.    ALLERGIES  Review of patient's allergies indicates:   Allergen Reactions    Carvedilol Other (See Comments)     Bradycardia and syncope    Boniva [ibandronate]     Codeine     Hydralazine analogues     Iodinated contrast media Hives    Kionex [sodium polystyrene sulfonate] Diarrhea     From encounter 17 for diarrhea--not true allergy.    Lisinopril     Morphine        INPATIENT SCHEDULED MEDICATIONS   ascorbic acid (vitamin C)  500 mg Oral Daily    atorvastatin  40 mg Oral Daily     diltiaZEM  180 mg Oral Daily    enoxparin  1 mg/kg Subcutaneous Q24H    fluticasone-umeclidin-vilanter  1 puff Inhalation Daily    furosemide (LASIX) injection  20 mg Intravenous Q12H    lactulose (CHRONULAC) 200 g with sodium chloride 0.9% ENEMA   Rectal TID    levoFLOXacin  500 mg Intravenous Q48H    levothyroxine  88 mcg Oral Before breakfast    losartan  25 mg Oral Daily    montelukast  10 mg Oral QHS    sertraline  50 mg Oral Daily         MEDICAL AND SURGICAL HISTORY  Past Medical History:   Diagnosis Date    Anemia due to multiple mechanisms 6/29/2018    Anemia, chronic disease 6/29/2018    Anemia, chronic renal failure, stage 3 (moderate) 6/29/2018    Anticoagulant long-term use     aspirin    Aortic aneurysm     Chest pain     CHF (congestive heart failure)     COPD (chronic obstructive pulmonary disease)     COPD with acute exacerbation 1/9/2015    Depression     Diabetes mellitus type II     no longer on any DM meds    DVT (deep venous thrombosis) 06/09/2018    Encounter for blood transfusion     GERD (gastroesophageal reflux disease)     GI bleed     Gout     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 8/7/2018    Hip arthritis 3/1/2016    Homocysteinemia 8/7/2018    Hyperlipidemia     Hypertension     Incontinent of urine     Normocytic normochromic anemia 6/29/2018    Oxygen dependent     use oxygen as needed    PE (pulmonary thromboembolism) 06/09/2018    Pneumonia of right lower lobe due to infectious organism 9/11/2017    Syncope     Thyroid disease     hypothyroid    Type 2 diabetes mellitus with stage 3 chronic kidney disease 1/18/2013    UTI (urinary tract infection)      Past Surgical History:   Procedure Laterality Date    APPENDECTOMY      CHOLECYSTECTOMY      COLONOSCOPY Left 10/29/2020    Procedure: COLONOSCOPY;  Surgeon: Singh Kee III, MD;  Location: [facility];  Service: Endoscopy;  Laterality: Left;    ESOPHAGOGASTRODUODENOSCOPY Left 10/26/2020    Procedure: EGD  (ESOPHAGOGASTRODUODENOSCOPY);  Surgeon: Kareem Gramajo MD;  Location: [facility] ENDO;  Service: Endoscopy;  Laterality: Left;    ESOPHAGOGASTRODUODENOSCOPY Left 10/28/2020    Procedure: EGD (ESOPHAGOGASTRODUODENOSCOPY);  Surgeon: Kareem Gramajo MD;  Location: [facility] ENDO;  Service: Endoscopy;  Laterality: Left;    ESOPHAGOGASTRODUODENOSCOPY N/A 9/16/2021    Procedure: EGD (ESOPHAGOGASTRODUODENOSCOPY);  Surgeon: Kareem Gramajo MD;  Location: [facility] ENDO;  Service: Endoscopy;  Laterality: N/A;    FRACTURE SURGERY      right hip     HERNIA REPAIR      groin    HYSTERECTOMY      INSERTION OF IMPLANTABLE LOOP RECORDER N/A 12/12/2018    Procedure: Insertion, Implantable Loop Recorder;  Surgeon: Ashok Herbert MD;  Location: [facility] CATH LAB;  Service: Cardiology;  Laterality: N/A;    PERCUTANEOUS PINNING OF HIP Left 3/23/2019    Procedure: PINNING, HIP, PERCUTANEOUS;  Surgeon: Jorje Hamlin MD;  Location: [facility] OR;  Service: Orthopedics;  Laterality: Left;    SMALL BOWEL ENTEROSCOPY N/A 10/29/2020    Procedure: ENTEROSCOPY;  Surgeon: Singh Kee III, MD;  Location: [facility];  Service: Endoscopy;  Laterality: N/A;    VARICOSE VEIN SURGERY         ALCOHOL, TOBACCO AND DRUG USE  Social History     Tobacco Use   Smoking Status Former    Packs/day: 0.35    Years: 44.00    Pack years: 15.40    Types: Cigarettes    Start date: 1970   Smokeless Tobacco Never   Tobacco Comments    1/08/2015     Social History     Substance and Sexual Activity   Alcohol Use No    Alcohol/week: 0.0 standard drinks     Social History     Substance and Sexual Activity   Drug Use No       FAMILY HISTORY  Family History   Problem Relation Age of Onset    Hypertension Mother     Heart disease Mother     Lung disease Father     Diabetes Sister     Diabetes Brother     Kidney disease Son     Breast cancer Neg Hx     Ovarian cancer Neg Hx        VITAL SIGNS (MOST RECENT)  Temp: 97.5 °F (36.4 °C) (10/18/22 0845)  Pulse: 81 (10/18/22 1130)  Resp: (!)  21 (10/18/22 1130)  BP: (!) 158/73 (10/18/22 1100)  SpO2: 100 % (10/18/22 1130)    INTAKE AND OUTPUT (LAST 24 HOURS):  Intake/Output Summary (Last 24 hours) at 10/18/2022 1149  Last data filed at 10/18/2022 0404  Gross per 24 hour   Intake 240 ml   Output 900 ml   Net -660 ml       WEIGHT  Wt Readings from Last 1 Encounters:   10/16/22 56.5 kg (124 lb 9 oz)       PHYSICAL EXAM  GENERAL: A&O. Thin elderly woman in NAD.  HEENT: Extraocular movements intact. Pharynx moist.  NECK: Supple. No JVD or hepatojugular reflux.  HEART: Regular rate and normal rhythm. No murmur or gallop auscultated.  LUNGS: Clear to auscultation and percussion. Lung excursion symmetrical.   ABDOMEN: Soft, non-tender, non-distended, no masses palpated.  EXTREMITIES: Normal muscle tone and joint movement, no cyanosis or clubbing.   LYMPHATICS: No adenopathy palpated, no edema.  SKIN: Dry, intact, no lesions.   NEURO: No gross deficit.  PSYCH: Appropriate affect    ACUTE PHASE REACTANT (LAST 24 HOURS)  No results for input(s): FERRITIN, CRP, LDH, DDIMER in the last 24 hours.    CBC LAST (LAST 24 HOURS)  Recent Labs   Lab 10/18/22  0417   WBC 11.05   RBC 2.72*   HGB 8.7*   HCT 27.4*   *   MCH 32.0*   MCHC 31.8*   RDW 13.6      MPV 11.4   GRAN 75.4*  8.3*   LYMPH 13.4*  1.5   MONO 10.3  1.1*   BASO 0.02   NRBC 0       CHEMISTRY LAST (LAST 24 HOURS)  Recent Labs   Lab 10/18/22  1108      K 4.0      CO2 23   ANIONGAP 11   BUN 49*   CREATININE 1.9*   *   CALCIUM 8.6*   ALBUMIN 2.8*   PROT 5.7*   ALKPHOS 65   ALT 22   AST 30   BILITOT 0.5       COAGULATION LAST (LAST 24 HOURS)  No results for input(s): LABPT, INR, APTT in the last 24 hours.    CARDIAC PROFILE (LAST 24 HOURS)  Recent Labs   Lab 10/16/22  1734 10/16/22  2121   BNP 2,653*  --    TROPONINI 0.404* 0.389*       LAST 7 DAYS MICROBIOLOGY   Microbiology Results (last 7 days)       Procedure Component Value Units Date/Time    Blood culture x two cultures. Draw  prior to antibiotics. [[id number]] Collected: 10/16/22 1840    Order Status: Completed Specimen: Blood from Peripheral, Hand, Right Updated: 10/17/22 2032     Blood Culture, Routine No Growth to date      No Growth to date    Narrative:      Aerobic and anaerobic    Blood culture x two cultures. Draw prior to antibiotics. [[id number]] Collected: 10/16/22 1945    Order Status: Completed Specimen: Blood from Peripheral, Antecubital, Right Updated: 10/17/22 2032     Blood Culture, Routine No Growth to date      No Growth to date    Narrative:      Aerobic and anaerobic            MOST RECENT IMAGING  X-Ray Chest AP Portable  Portable chest x-ray at 6:59 AM is compared to prior study dated 10/16/2022    Clinical history is shortness of breath    The heart is enlarged. There heart monitor.    There is stable right lower lobe alveolar opacity suggestive of pneumonia with tiny pleural effusions. The right upper lobe and left lung are clear. There are degenerative changes of the spine.    IMPRESSION: Cardiomegaly with stable right lower lobe alveolar opacity suggestive of pneumonia tiny bilateral pleural effusions    Electronically signed by:  Jonna Lyn MD  10/18/2022 8:18 AM CDT Workstation: [id number]      CURRENT VISIT EKG  Results for orders placed or performed during the hospital encounter of 10/16/22   EKG 12-LEAD    Narrative    Test Reason : R07.9,    Vent. Rate : 069 BPM     Atrial Rate : 069 BPM     P-R Int : 184 ms          QRS Dur : 090 ms      QT Int : 430 ms       P-R-T Axes : 052 038 -48 degrees     QTc Int : 460 ms    Normal sinus rhythm  Anteroseptal infarct (cited on or before 20-JUL-2022)  Abnormal ECG  When compared with ECG of 20-JUL-2022 17:02,  Questionable change in initial forces of Anterior leads  Non-specific change in ST segment in Anterior leads  Nonspecific T wave abnormality now evident in Inferior leads  T wave inversion now evident in Anterior leads    Referred By: [email or web address]   SELF            Confirmed By:        ECHOCARDIOGRAM RESULTS  Results for orders placed during the hospital encounter of 03/21/22    Echo    Interpretation Summary  · The estimated PA systolic pressure is 43 mmHg.  · The left ventricle is normal in size with mild concentric hypertrophy and normal systolic function.  · The estimated ejection fraction is 65%.  · Grade II left ventricular diastolic dysfunction.  · Normal right ventricular size with normal right ventricular systolic function.  · Moderate mitral regurgitation.  · There is moderate mitral stenosis.  · There is moderate mitral leaflet calcification.  · Mild tricuspid regurgitation.  · There is mild pulmonary hypertension.  · There is mild aortic valve stenosis.  · Aortic valve area is 1.77 cm2; peak velocity is 2.08 m/s; mean gradient is 11 mmHg.  · Moderate left atrial enlargement.  · Mild right atrial enlargement.        RESPIRATORY SUPPORT  Oxygen Concentration (%):  [40] 40       LAST ARTERIAL BLOOD GAS  ABG  No results for input(s): PH, PO2, PCO2, HCO3, BE in the last 168 hours.    PFTs (10/30/2017)  FEV1/FVC 0.53, FEV1 53%, FVC 75%  With less than significant bronchodilator response.    IMPRESSION AND PLAN  Blaire Cage is a 89 y.o. 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    #Acute on chronic hypoxic respiratory failure  #CAP  #Acute decompensated HFpEF  #Eosinophilic asthma  #COPD 2D with acute exacerbation  #Likely pulmonary hypertension, likely group 2 and 3  - continue DuoNebs  - Trelegy discontinued inpatient, as concurrent administration of LAMA and GURPREET can increase antimuscarinic side effects.  - continue levofloxacin for 5-day course  - agree with diuresis and GDMT for CHF      Adithya Pena MD  [facility]  Department of Pulmonology  Date of Service: 10/18/2022  11:49 AM

## 2022-10-18 NOTE — PROGRESS NOTES
Automatic Inhaler to Nebulizer Interchange    Budesonide (Pulmicort) 180-600 mcg changed to Budesonide 0.5 mg BID per [facility] Automatic Therapeutic Sustitutions Protocol.    Please contact pharmacy at extension [phone] with any questions.     Thank you,   Niko Lua

## 2022-10-18 NOTE — NURSING
0842: Pt arrived to RM 2526 via bed, telebox/bipap on, RT at bedside. VSS, afebrile. Oriented to new room, call light in reach.

## 2022-10-19 NOTE — PLAN OF CARE
Goals to be met by: 22     Patient will increase functional independence with mobility by performin. Supine to sit with Supervision  2. Sit to stand transfer with Supervision  3. Bed to chair transfer with Supervision using Rolling Walker  4. Gait  x 50 feet with Supervision using Rolling Walker.

## 2022-10-19 NOTE — PLAN OF CARE
Patient was tired but stated she was feeling better today. Patient currently sitting up in bed, no acute distress noted, awake, alert, and oriented x 3, calm, respirations even and unlabored, and skin is warm and dry. Patient verbalized understanding of status and plan of care. Patient denies needs at this time. Side rails up x 2, call light in reach, bed low and locked. Will continue to monitor.    Problem: Oral Intake Inadequate (Acute Kidney Injury/Impairment)  Goal: Optimal Nutrition Intake  Outcome: Ongoing, Progressing     Problem: Infection (Pneumonia)  Goal: Resolution of Infection Signs and Symptoms  Outcome: Ongoing, Progressing     Problem: Respiratory Compromise (Pneumonia)  Goal: Effective Oxygenation and Ventilation  Outcome: Ongoing, Progressing     Problem: Fall Injury Risk  Goal: Absence of Fall and Fall-Related Injury  Outcome: Ongoing, Progressing

## 2022-10-19 NOTE — CONSULTS
[facility] [place]  Department of Cardiology  Consult Note      PATIENT NAME: Blaire Cage  MRN: [id number]  TODAY'S DATE: 10/19/2022  ADMIT DATE: 10/16/2022                          CONSULT REQUESTED BY: Joey Ramsey MD    SUBJECTIVE     PRINCIPAL PROBLEM: Pneumonia      REASON FOR CONSULT:  Abnormal ECHO      HPI:    89 year old female patient known to myself and SARA De La Fuente. She has been having SOB, fatigue fever and cough and came to ER for evaluation was diagnosed with pneumonia  ECHO shows apical hypokinesis and we have been consulted for the same. She denies CP or Palpitations.         Patient information was obtained from patient, past medical records and ER records.   HISTORY OF PRESENT ILLNESS:   Blaire Cage is a 89 y.o. old female who  has a past medical history of Anemia due to multiple mechanisms (6/29/2018), Anemia, chronic disease (6/29/2018), Anemia, chronic renal failure, stage 3 (moderate) (6/29/2018), Anticoagulant long-term use, Aortic aneurysm, Chest pain, CHF (congestive heart failure), COPD (chronic obstructive pulmonary disease), COPD with acute exacerbation (1/9/2015), Depression, Diabetes mellitus type II, DVT (deep venous thrombosis) (06/09/2018), Encounter for blood transfusion, GERD (gastroesophageal reflux disease), GI bleed, Gout, 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 (8/7/2018), Hip arthritis (3/1/2016), Homocysteinemia (8/7/2018), Hyperlipidemia, Hypertension, Incontinent of urine, Normocytic normochromic anemia (6/29/2018), Oxygen dependent, PE (pulmonary thromboembolism) (06/09/2018), Pneumonia of right lower lobe due to infectious organism (9/11/2017), Syncope, Thyroid disease, Type 2 diabetes mellitus with stage 3 chronic kidney disease (1/18/2013), and UTI (urinary tract infection).. The patient presented to [facility] on 10/16/2022 with a primary complaint of Shortness of Breath and Weakness (Pt reports to ED with weakness/SOB starting last night.  Pt has a hx of COPD(as needed/at night). Pt in nad at triage, vss, in wheelchair.)  .      Very pleasant 89-year-old female presents to the emergency room with shortness of breath.  The patient states she has been having shortness of breath and weakness for the past 2 days.  Patient states last night shortness of breath got progressively worse.  It she also endorses generalized weakness a cough and fatigue.  She describes her cough as productive in nature and yellow in color.  She denies fever chills hematemesis hemoptysis lightheadedness dizziness or syncope.       The patient does use oxygen at home at night she is on CPAP.  She has been using her oxygen more secondary to dyspnea with exertion.  She denied actual chest pain lightheadedness dizziness palpitations or syncope     She describes her symptoms as moderate to severe severity with no exacerbating or alleviating factors        Past medical history is significant for chronic kidney disease stage 3, hypertension, aortic aneurysm, CHF, COPD, type 2 diabetes, depression, arthritis, hyperlipidemia, normocytic anemia, oxygen dependent, pulmonary in emboli on 06/09/2018, thyroid disease.     In the emergency room she was found to have pneumonia and was started with a broad-spectrum antibiotic and DuoNeb treatments.  She was also found to have a component of heart failure and given IV Lasix.  We will monitor her output and intake closely and consult her cardiologist for further recommendations  REVIEW OF SYSTEMS:   10 Point Review of System was performed and was found to be negative except for that mentioned already in the HPI and   Review of Systems (Negative unless checked off)  Review of Systems   Constitutional:  Positive for chills and malaise/fatigue.   HENT: Negative.     Eyes: Negative.    Respiratory:  Positive for cough, sputum production and shortness of breath.    Cardiovascular:  Positive for leg swelling.   Gastrointestinal: Negative.     Genitourinary: Negative.    Musculoskeletal: Negative.    Skin: Negative.    Neurological:  Positive for weakness.   Endo/Heme/Allergies: Negative.    Psychiatric/Behavioral: Negative.           Review of patient's allergies indicates:   Allergen Reactions    Carvedilol Other (See Comments)     Bradycardia and syncope    Boniva [ibandronate]     Codeine     Hydralazine analogues     Iodinated contrast media Hives    Kionex [sodium polystyrene sulfonate] Diarrhea     From encounter 12/11/17 for diarrhea--not true allergy.    Lisinopril     Morphine        Past Medical History:   Diagnosis Date    Anemia due to multiple mechanisms 6/29/2018    Anemia, chronic disease 6/29/2018    Anemia, chronic renal failure, stage 3 (moderate) 6/29/2018    Anticoagulant long-term use     aspirin    Aortic aneurysm     Chest pain     CHF (congestive heart failure)     COPD (chronic obstructive pulmonary disease)     COPD with acute exacerbation 1/9/2015    Depression     Diabetes mellitus type II     no longer on any DM meds    DVT (deep venous thrombosis) 06/09/2018    Encounter for blood transfusion     GERD (gastroesophageal reflux disease)     GI bleed     Gout     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 8/7/2018    Hip arthritis 3/1/2016    Homocysteinemia 8/7/2018    Hyperlipidemia     Hypertension     Incontinent of urine     Normocytic normochromic anemia 6/29/2018    Oxygen dependent     use oxygen as needed    PE (pulmonary thromboembolism) 06/09/2018    Pneumonia of right lower lobe due to infectious organism 9/11/2017    Syncope     Thyroid disease     hypothyroid    Type 2 diabetes mellitus with stage 3 chronic kidney disease 1/18/2013    UTI (urinary tract infection)      Past Surgical History:   Procedure Laterality Date    APPENDECTOMY      CHOLECYSTECTOMY      COLONOSCOPY Left 10/29/2020    Procedure: COLONOSCOPY;  Surgeon: Singh Kee III, MD;  Location: [facility];  Service: Endoscopy;  Laterality: Left;     ESOPHAGOGASTRODUODENOSCOPY Left 10/26/2020    Procedure: EGD (ESOPHAGOGASTRODUODENOSCOPY);  Surgeon: Kareem Gramajo MD;  Location: [facility] ENDO;  Service: Endoscopy;  Laterality: Left;    ESOPHAGOGASTRODUODENOSCOPY Left 10/28/2020    Procedure: EGD (ESOPHAGOGASTRODUODENOSCOPY);  Surgeon: Kareem Gramajo MD;  Location: [facility] ENDO;  Service: Endoscopy;  Laterality: Left;    ESOPHAGOGASTRODUODENOSCOPY N/A 9/16/2021    Procedure: EGD (ESOPHAGOGASTRODUODENOSCOPY);  Surgeon: Kareem Gramajo MD;  Location: [facility] ENDO;  Service: Endoscopy;  Laterality: N/A;    FRACTURE SURGERY      right hip     HERNIA REPAIR      groin    HYSTERECTOMY      INSERTION OF IMPLANTABLE LOOP RECORDER N/A 12/12/2018    Procedure: Insertion, Implantable Loop Recorder;  Surgeon: Ashok Herbert MD;  Location: [facility] CATH LAB;  Service: Cardiology;  Laterality: N/A;    PERCUTANEOUS PINNING OF HIP Left 3/23/2019    Procedure: PINNING, HIP, PERCUTANEOUS;  Surgeon: Jorje Hamlin MD;  Location: [facility] OR;  Service: Orthopedics;  Laterality: Left;    SMALL BOWEL ENTEROSCOPY N/A 10/29/2020    Procedure: ENTEROSCOPY;  Surgeon: Singh Kee III, MD;  Location: [facility];  Service: Endoscopy;  Laterality: N/A;    VARICOSE VEIN SURGERY       Social History     Tobacco Use    Smoking status: Former     Packs/day: 0.35     Years: 44.00     Pack years: 15.40     Types: Cigarettes     Start date: 1970    Smokeless tobacco: Never    Tobacco comments:     1/08/2015   Substance Use Topics    Alcohol use: No     Alcohol/week: 0.0 standard drinks    Drug use: No        REVIEW OF SYSTEMS  +cough  +wheezing  +sob -improving      OBJECTIVE     VITAL SIGNS (Most Recent)  Temp: 97.9 °F (36.6 °C) (10/19/22 0400)  Pulse: 65 (10/19/22 0813)  Resp: 18 (10/19/22 0813)  BP: 133/65 (10/19/22 0700)  SpO2: 100 % (10/19/22 0813)    VENTILATION STATUS  Resp: 18 (10/19/22 0813)  SpO2: 100 % (10/19/22 0813)       I & O (Last 24H):  Intake/Output Summary (Last 24 hours) at  10/19/2022 0845  Last data filed at 10/19/2022 0600  Gross per 24 hour   Intake 600 ml   Output 800 ml   Net -200 ml       WEIGHTS  Wt Readings from Last 3 Encounters:   10/16/22 2233 56.5 kg (124 lb 9 oz)   10/16/22 1621 57.6 kg (127 lb)   10/18/22 1200 56.2 kg (124 lb)   10/12/22 1606 57 kg (125 lb 9.6 oz)       PHYSICAL EXAM  GENERAL: well built, well nourished, well-developed in no apparent distress alert and oriented.   HEENT: Normocephalic. Pupils normal and conjunctivae normal.  Mucous membranes normal, no cyanosis or icterus, trachea central,no pallor or icterus is noted..   NECK: No JVD. No bruit..   THYROID: Thyroid not enlarged. No nodules present..   CARDIAC: systolic murmur noted  CHEST ANATOMY: normal.   LUNGS: wheezing  ABDOMEN: Soft no masses or organomegaly.  No abdomen pulsations or bruits.  Normal bowel sounds. No pulsations and no masses felt, No guarding or rebound.   URINARY: No carter catheter   EXTREMITIES: No cyanosis, clubbing or edema noted at this time., no calf tenderness bilaterally.   PERIPHERAL VASCULAR SYSTEM: Good palpable distal pulses.   CENTRAL NERVOUS SYSTEM: No focal motor or sensory deficits noted.   SKIN: Skin without lesions, moist, well perfused.   MUSCLE STRENGTH & TONE: No noteable weakness, atrophy or abnormal movement.     HOME MEDICATIONS:  No current facility-administered medications on file prior to encounter.     Current Outpatient Medications on File Prior to Encounter   Medication Sig Dispense Refill    albuterol (PROVENTIL) 2.5 mg /3 mL (0.083 %) nebulizer solution Take 3 mLs (2.5 mg total) by nebulization every 6 (six) hours as needed for Wheezing or Shortness of Breath. USE 1 VIAL IN NEBULIZER EVERY 6 HOURS AS NEEDED FOR WHEEZING. RESCUE 360 each 3    allopurinoL (ZYLOPRIM) 100 MG tablet Take 1 tablet (100 mg total) by mouth once daily. 90 tablet 3    ascorbic acid, vitamin C, (VITAMIN C) 500 MG tablet Take 500 mg by mouth once daily.      calcium carbonate  (OS-TASIA) 500 mg calcium (1,250 mg) tablet Take 1 tablet by mouth 2 (two) times daily.      diltiaZEM (CARDIZEM CD) 180 MG 24 hr capsule Take 1 capsule (180 mg total) by mouth once daily. 90 capsule 3    fish oil-omega-3 fatty acids 300-1,000 mg capsule Take 2 g by mouth every evening.      fluticasone-umeclidin-vilanter (TRELEGY ELLIPTA) 200-62.5-25 mcg inhaler Inhale 1 puff into the lungs once daily. 60 each 11    folic acid-vit B6-vit B12 2.5-25-2 mg (FOLBIC) 2.5-25-2 mg Tab Take 1 tablet by mouth once daily.      furosemide (LASIX) 20 MG tablet Take 1 tablet only as needed, for swelling, increase SOB, weight gain of 3 lbs overnight or 5 lbs for the week 30 tablet prn    levothyroxine (SYNTHROID) 88 MCG tablet Take 1 tablet (88 mcg total) by mouth once daily. 90 tablet 3    losartan (COZAAR) 25 MG tablet Take 1 tablet (25 mg total) by mouth once daily. (Patient taking differently: Take 25 mg by mouth once daily. prn) 90 tablet 3    mepolizumab 100 mg/mL AtIn Inject 1 mL (100 mg total) into the skin every 28 days. 1 mL 12    montelukast (SINGULAIR) 10 mg tablet TAKE 1 TABLET BY MOUTH ONCE DAILY IN THE EVENING (Patient taking differently: Take 10 mg by mouth every evening.) 90 tablet 3    multivitamin (THERAGRAN) tablet Take 1 tablet by mouth once daily.      omeprazole (PRILOSEC) 40 MG capsule Take 1 capsule (40 mg total) by mouth once daily. 90 capsule 3    rosuvastatin (CRESTOR) 20 MG tablet Take 1 tablet (20 mg total) by mouth once daily. 90 tablet 3    sertraline (ZOLOFT) 50 MG tablet Take 1 tablet (50 mg total) by mouth once daily. 90 tablet 1    simethicone 180 mg Cap Take 1 capsule by mouth 3 (three) times daily as needed.      acetaminophen (TYLENOL) 325 MG tablet Take 325 mg by mouth every 6 (six) hours as needed for Pain.      ciclopirox (PENLAC) 8 % Soln Apply topically nightly. 6.6 mL 3    ferrous sulfate (FEOSOL) 325 mg (65 mg iron) Tab tablet Take 325 mg by mouth once daily.      polyethylene glycol  (GLYCOLAX) 17 gram/dose powder Take 17 g by mouth 2 (two) times daily. 850 g 3    [DISCONTINUED] clobetasol 0.05% (TEMOVATE) 0.05 % Oint Apply topically 2 (two) times daily. 45 g 1    [DISCONTINUED] diclofenac sodium (VOLTAREN) 1 % Gel Apply 2 g topically once daily. (Patient taking differently: Apply 2 g topically as needed.) 100 g prn    [DISCONTINUED] ketoconazole (NIZORAL) 2 % shampoo Apply topically twice a week. (Patient taking differently: Apply 1 application topically twice a week.) 120 mL 2    [DISCONTINUED] meclizine (ANTIVERT) 25 mg tablet Take 1 tablet (25 mg total) by mouth 3 (three) times daily as needed. 20 tablet prn    [DISCONTINUED] nitroGLYCERIN (NITROSTAT) 0.4 MG SL tablet Place 1 tablet (0.4 mg total) under the tongue every 5 (five) minutes as needed for Chest pain. As needed (Patient taking differently: Place 0.4 mg under the tongue every 5 (five) minutes as needed for Chest pain. Seek medical help if pain is not relieved after the third dose.) 30 tablet 3    [DISCONTINUED] nystatin (MYCOSTATIN) powder Apply topically 4 (four) times daily. 60 g 1       SCHEDULED MEDS:   ascorbic acid (vitamin C)  500 mg Oral Daily    atorvastatin  40 mg Oral Daily    diltiaZEM  180 mg Oral Daily    enoxparin  1 mg/kg Subcutaneous Q24H    lactulose (CHRONULAC) 200 g with sodium chloride 0.9% ENEMA   Rectal TID    levoFLOXacin  500 mg Intravenous Q48H    levothyroxine  88 mcg Oral Before breakfast    losartan  25 mg Oral Daily    montelukast  10 mg Oral QHS    NON FORMULARY MEDICATION 1 puff  1 puff Inhalation Daily    sertraline  50 mg Oral Daily       CONTINUOUS INFUSIONS:    PRN MEDS:albuterol-ipratropium, dextrose 10%, dextrose 10%, glucagon (human recombinant), insulin aspart U-100, LORazepam, melatonin, ondansetron, sodium chloride 0.9%, sodium chloride 0.9%    LABS AND DIAGNOSTICS     CBC LAST 3 DAYS  Recent Labs   Lab 10/17/22  0506 10/18/22  0417 10/19/22  0405   WBC 6.12 11.05 8.02   RBC 2.92* 2.72*  2.44*   HGB 9.3* 8.7* 7.8*   HCT 29.1* 27.4* 24.8*   * 101* 102*   MCH 31.8* 32.0* 32.0*   MCHC 32.0 31.8* 31.5*   RDW 13.6 13.6 13.9    201 193   MPV 11.4 11.4 11.4   GRAN 83.9*  5.1 75.4*  8.3* 71.3  5.7   LYMPH 13.6*  0.8* 13.4*  1.5 16.8*  1.4   MONO 1.3*  0.1* 10.3  1.1* 10.7  0.9   BASO 0.01 0.02 0.02   NRBC 0 0 0       COAGULATION LAST 3 DAYS  No results for input(s): LABPT, INR, APTT in the last 168 hours.    CHEMISTRY LAST 3 DAYS  Recent Labs   Lab 10/17/22  0506 10/18/22  1108 10/19/22  0405    142 142   K 4.4 4.0 4.0    108 107   CO2 20* 23 27   ANIONGAP 13 11 8   BUN 41* 49* 55*   CREATININE 1.7* 1.9* 2.0*   * 132* 106   CALCIUM 8.5* 8.6* 8.3*   MG 1.8  --   --    ALBUMIN 2.8* 2.8* 2.6*   PROT 5.8* 5.7* 5.3*   ALKPHOS 70 65 60   ALT 21 22 20   AST 29 30 27   BILITOT 0.5 0.5 0.6       CARDIAC PROFILE LAST 3 DAYS  Recent Labs   Lab 10/16/22  1734 10/16/22  2121   BNP 2,653*  --    TROPONINI 0.404* 0.389*       ENDOCRINE LAST 3 DAYS  No results for input(s): TSH, PROCAL in the last 168 hours.    LAST ARTERIAL BLOOD GAS  ABG  No results for input(s): PH, PO2, PCO2, HCO3, BE in the last 168 hours.    LAST 7 DAYS MICROBIOLOGY   Microbiology Results (last 7 days)       Procedure Component Value Units Date/Time    Blood culture x two cultures. Draw prior to antibiotics. [[id number]] Collected: 10/16/22 1840    Order Status: Completed Specimen: Blood from Peripheral, Hand, Right Updated: 10/18/22 2032     Blood Culture, Routine No Growth to date      No Growth to date      No Growth to date    Narrative:      Aerobic and anaerobic    Blood culture x two cultures. Draw prior to antibiotics. [[id number]] Collected: 10/16/22 1945    Order Status: Completed Specimen: Blood from Peripheral, Antecubital, Right Updated: 10/18/22 2032     Blood Culture, Routine No Growth to date      No Growth to date      No Growth to date    Narrative:      Aerobic and anaerobic            MOST  RECENT IMAGING  X-Ray Chest AP Portable  Chest single view    CLINICAL DATA: Shortness of breath    FINDINGS: AP view is compared to October 18.    Cardiomegaly is stable. The aortic arch is calcified. The lungs are hyperinflated. Atelectasis or infiltrate at the right lung base appears mildly improved. Tiny bilateral pleural effusions are stable.    IMPRESSION:  1. Improving atelectasis or infiltrate at the right lung base. No other significant changes.    Electronically signed by:  Ariel Barr MD  10/19/2022 6:35 AM CDT Workstation: [id number]      ECHOCARDIOGRAM RESULTS (last 5)  Results for orders placed during the hospital encounter of 10/16/22    Echo    Interpretation Summary  · Moderate concentric hypertrophy and mildly decreased systolic function.  · The estimated ejection fraction is 45%.  · There are segmental left ventricular wall motion abnormalities. Anterior apical akinesia  · Moderate to severe tricuspid regurgitation.  · Moderate-to-severe mitral regurgitation.  · There is moderate to severe pulmonary hypertension.  · Severe left atrial enlargement.  · Grade II left ventricular diastolic dysfunction.  · Atrial fibrillation not observed.  · There is right ventricular hypertrophy.  · There is mild mitral stenosis. Mitral valve not well seen because of heavy mitral annular calcification      Results for orders placed during the hospital encounter of 03/21/22    Echo    Interpretation Summary  · The estimated PA systolic pressure is 43 mmHg.  · The left ventricle is normal in size with mild concentric hypertrophy and normal systolic function.  · The estimated ejection fraction is 65%.  · Grade II left ventricular diastolic dysfunction.  · Normal right ventricular size with normal right ventricular systolic function.  · Moderate mitral regurgitation.  · There is moderate mitral stenosis.  · There is moderate mitral leaflet calcification.  · Mild tricuspid regurgitation.  · There is mild pulmonary  hypertension.  · There is mild aortic valve stenosis.  · Aortic valve area is 1.77 cm2; peak velocity is 2.08 m/s; mean gradient is 11 mmHg.  · Moderate left atrial enlargement.  · Mild right atrial enlargement.      Results for orders placed during the hospital encounter of 10/19/21    Echo    Interpretation Summary  · The estimated PA systolic pressure is 44 mmHg.  · The left ventricle is normal in size with severe concentric hypertrophy and normal systolic function.  · The estimated ejection fraction is 60%.  · Grade II left ventricular diastolic dysfunction.  · Mild right ventricular enlargement.  · Mild left atrial enlargement.  · Mild to moderate tricuspid regurgitation.  · Mild-to-moderate mitral regurgitation.  · Normal central venous pressure (3 mmHg).      Results for orders placed during the hospital encounter of 05/07/20    Echo Color Flow Doppler? Yes; Bubble Contrast? Yes    Interpretation Summary  · Concentric left ventricular hypertrophy.  · The mean diastolic gradient across the mitral valve is 5 mmHg at a heart rate of 57 bpm.  · Normal left ventricular systolic function. The estimated ejection fraction is 65%.  · Grade II (moderate) left ventricular diastolic dysfunction consistent with pseudonormalization.  · Normal right ventricular systolic function.  · Severe left atrial enlargement.  · Mild right atrial enlargement.  · Mild mitral regurgitation.  · Mild to moderate tricuspid regurgitation.  · Normal central venous pressure (3 mmHg).  · The estimated PA systolic pressure is 50 mmHg.  · Pulmonary hypertension present.  · No PFO on color flow or saline bubble study      CURRENT/PREVIOUS VISIT EKG  Results for orders placed or performed during the hospital encounter of 10/16/22   EKG 12-LEAD    Collection Time: 10/16/22  6:27 PM    Narrative    Test Reason : R07.9,    Vent. Rate : 069 BPM     Atrial Rate : 069 BPM     P-R Int : 184 ms          QRS Dur : 090 ms      QT Int : 430 ms       P-R-T  Axes : 052 038 -48 degrees     QTc Int : 460 ms    Normal sinus rhythm  Anteroseptal infarct (cited on or before 20-JUL-2022)  Abnormal ECG  When compared with ECG of 20-JUL-2022 17:02,  Questionable change in initial forces of Anterior leads  Non-specific change in ST segment in Anterior leads  Nonspecific T wave abnormality now evident in Inferior leads  T wave inversion now evident in Anterior leads    Referred By: [email or web address]   SELF           Confirmed By:            ASSESSMENT/PLAN:     Active Hospital Problems    Diagnosis    *Pneumonia    Acute on chronic combined systolic and diastolic heart failure       ASSESSMENT & PLAN:     Acute on Chronic CHFrEF  Pneumonia  Moderate TR  Moderate MR  Apical Hypokinesis  PH  Anemia- hg 7.8  SHALINI on CKD    RECOMMENDATIONS:      Check BNP  Check 2 view CXR in am  Add imdur 30 mg daily  Continue cardizem for now when less wheezing consider beta blocker   Consider asa if hemoglobin does not drop any more      Sadie Perez NP  On license of [facility]  Department of Cardiology  Date of Service: 10/19/2022        I have personally interviewed and examined the patient, I have reviewed the Nurse Practitioner's history and physical, assessment, and plan. I agree with the findings and plan.    She presented with to 3 days of shortness of breath.  She has a new apical akinesia is on her echo.  EF is mildly decreased.  EKG reveals completed anterior MI.  Her lungs are congested and squeaky all over.  Her chest x-ray reveals pulmonary edema.  Her BNP level is elevated 2653.  Her troponins are mildly elevated and flat.  Repeat troponin is pending.    She is ischemic heart disease probable recent MI and congestive heart failure appeared.  .  Will change of calcium channel blocker to beta-blocker when her lungs are little clear .  Will give a dose of IV Lasix daily and watch the creatinine.      Shelton Desouza M.D.  On license of [facility]  Department of Cardiology  Date of Service:  10/19/2022  8:45 AM

## 2022-10-19 NOTE — RESPIRATORY THERAPY
10/18/22 2048   Patient Assessment/Suction   Level of Consciousness (AVPU) alert   Respiratory Effort Normal;Unlabored   Expansion/Accessory Muscles/Retractions no use of accessory muscles   All Lung Fields Breath Sounds equal bilaterally;wheezes, expiratory   Rhythm/Pattern, Respiratory unlabored   Cough Frequency infrequent   Skin Integrity   $ Wound Care Tech Time 15 min   Area Observed Bridge of nose   Skin Appearance without discoloration   PRE-TX-O2   O2 Device (Oxygen Therapy) nasal cannula   Flow (L/min) 3   SpO2 97 %   Pulse Oximetry Type Continuous   $ Pulse Oximetry - Multiple Charge Pulse Oximetry - Multiple   Pulse 77   Resp 18   Aerosol Therapy   $ Aerosol Therapy Charges Aerosol Treatment   Daily Review of Necessity (SVN) completed   Respiratory Treatment Status (SVN) given   Treatment Route (SVN) mask   Patient Position (SVN) HOB elevated   Post Treatment Assessment (SVN) breath sounds unchanged   Signs of Intolerance (SVN) none   Breath Sounds Post-Respiratory Treatment   Post-treatment Heart Rate (beats/min) 78   Post-treatment Resp Rate (breaths/min) 16   Preset CPAP/BiPAP Settings   Mode Of Delivery Standby   Education   $ Education Bronchodilator;15 min   Respiratory Evaluation   $ Care Plan Tech Time 15 min   $ Eval/Re-eval Charges Re-evaluation   Evaluation For Re-Eval 3 day

## 2022-10-19 NOTE — PLAN OF CARE
Patient stable overnight on nasal canula. She refused her bipap. Minimal urination despite iv lasix doses.     Problem: Adult Inpatient Plan of Care  Goal: Plan of Care Review  Outcome: Ongoing, Progressing  Goal: Patient-Specific Goal (Individualized)  Outcome: Ongoing, Progressing  Goal: Absence of Hospital-Acquired Illness or Injury  Outcome: Ongoing, Progressing  Goal: Optimal Comfort and Wellbeing  Outcome: Ongoing, Progressing  Goal: Readiness for Transition of Care  Outcome: Ongoing, Progressing     Problem: Diabetes Comorbidity  Goal: Blood Glucose Level Within Targeted Range  Outcome: Ongoing, Progressing     Problem: Fluid and Electrolyte Imbalance (Acute Kidney Injury/Impairment)  Goal: Fluid and Electrolyte Balance  Outcome: Ongoing, Progressing     Problem: Oral Intake Inadequate (Acute Kidney Injury/Impairment)  Goal: Optimal Nutrition Intake  Outcome: Ongoing, Progressing     Problem: Renal Function Impairment (Acute Kidney Injury/Impairment)  Goal: Effective Renal Function  Outcome: Ongoing, Progressing     Problem: Fluid Imbalance (Pneumonia)  Goal: Fluid Balance  Outcome: Ongoing, Progressing     Problem: Infection (Pneumonia)  Goal: Resolution of Infection Signs and Symptoms  Outcome: Ongoing, Progressing     Problem: Respiratory Compromise (Pneumonia)  Goal: Effective Oxygenation and Ventilation  Outcome: Ongoing, Progressing     Problem: Fall Injury Risk  Goal: Absence of Fall and Fall-Related Injury  Outcome: Ongoing, Progressing     Problem: Skin Injury Risk Increased  Goal: Skin Health and Integrity  Outcome: Ongoing, Progressing

## 2022-10-19 NOTE — PROGRESS NOTES
Pulmonary/Critical Care Progress Note      PATIENT NAME: Blaire Cage  MRN: [id number]  TODAY'S DATE: 10/19/2022  11:49 AM  ADMIT DATE: 10/16/2022  AGE: 89 y.o. : 1930    CONSULT REQUESTED BY: Joey Ramsey MD    REASON FOR CONSULT:   COPD exacerbation, CAP    HISTORY OF PRESENT ILLNESS   Blaire Cage is a 89 y.o. 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    The patient presented the day before yesterday with shortness of breath. She was found to have right lower lung opacities on CXR and very elevated BNP. She has been given diuresis, Abx, steroids, and bronchodilators. She was stepped up to the step-down unit on BiPAP, but this was quickly weaned off. The patient feels much better now.    10/19/22: Feeling overall better today. Less SOB. No pain. Appreciative of care.    SMOKING HISTORY  Current 3 cigarette/day smoker. No interest in quitting.    REVIEW OF SYSTEMS  GENERAL: Feeling well. No fevers, chills, or night sweats.  EYES: Vision is good.  ENT: No sinusitis or pharyngitis.   HEART: No chest pain or palpitations.  LUNGS: No cough, sputum, or wheezing.  GI: No abdominal pain, nausea, vomiting, or diarrhea.  : No dysuria, urgency, or frequency.  SKIN: No lesions or rashes.  MUSCULOSKELETAL: No joint pain or myalgias.  NEURO: No headaches or neuropathy.  LYMPH: No edema or adenopathy.  PSYCH: No anxiety or depression.  ENDO: No unexpected weight change.    ALLERGIES  Review of patient's allergies indicates:   Allergen Reactions    Carvedilol Other (See Comments)     Bradycardia and syncope    Boniva [ibandronate]     Codeine     Hydralazine analogues     Iodinated contrast media Hives    Kionex [sodium polystyrene sulfonate] Diarrhea     From encounter 17 for diarrhea--not true allergy.    Lisinopril     Morphine        INPATIENT SCHEDULED  MEDICATIONS   ascorbic acid (vitamin C)  500 mg Oral Daily    atorvastatin  40 mg Oral Daily    diltiaZEM  180 mg Oral Daily    enoxparin  1 mg/kg Subcutaneous Q24H    lactulose (CHRONULAC) 200 g with sodium chloride 0.9% ENEMA   Rectal TID    levoFLOXacin  500 mg Intravenous Q48H    levothyroxine  88 mcg Oral Before breakfast    losartan  25 mg Oral Daily    montelukast  10 mg Oral QHS    NON FORMULARY MEDICATION 1 puff  1 puff Inhalation Daily    sertraline  50 mg Oral Daily         MEDICAL AND SURGICAL HISTORY  Past Medical History:   Diagnosis Date    Anemia due to multiple mechanisms 6/29/2018    Anemia, chronic disease 6/29/2018    Anemia, chronic renal failure, stage 3 (moderate) 6/29/2018    Anticoagulant long-term use     aspirin    Aortic aneurysm     Chest pain     CHF (congestive heart failure)     COPD (chronic obstructive pulmonary disease)     COPD with acute exacerbation 1/9/2015    Depression     Diabetes mellitus type II     no longer on any DM meds    DVT (deep venous thrombosis) 06/09/2018    Encounter for blood transfusion     GERD (gastroesophageal reflux disease)     GI bleed     Gout     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 8/7/2018    Hip arthritis 3/1/2016    Homocysteinemia 8/7/2018    Hyperlipidemia     Hypertension     Incontinent of urine     Normocytic normochromic anemia 6/29/2018    Oxygen dependent     use oxygen as needed    PE (pulmonary thromboembolism) 06/09/2018    Pneumonia of right lower lobe due to infectious organism 9/11/2017    Syncope     Thyroid disease     hypothyroid    Type 2 diabetes mellitus with stage 3 chronic kidney disease 1/18/2013    UTI (urinary tract infection)      Past Surgical History:   Procedure Laterality Date    APPENDECTOMY      CHOLECYSTECTOMY      COLONOSCOPY Left 10/29/2020    Procedure: COLONOSCOPY;  Surgeon: Singh Kee III, MD;  Location: [facility];  Service: Endoscopy;  Laterality: Left;    ESOPHAGOGASTRODUODENOSCOPY Left 10/26/2020     Procedure: EGD (ESOPHAGOGASTRODUODENOSCOPY);  Surgeon: Kareem Gramajo MD;  Location: [facility] ENDO;  Service: Endoscopy;  Laterality: Left;    ESOPHAGOGASTRODUODENOSCOPY Left 10/28/2020    Procedure: EGD (ESOPHAGOGASTRODUODENOSCOPY);  Surgeon: Kareem Gramajo MD;  Location: [facility] ENDO;  Service: Endoscopy;  Laterality: Left;    ESOPHAGOGASTRODUODENOSCOPY N/A 9/16/2021    Procedure: EGD (ESOPHAGOGASTRODUODENOSCOPY);  Surgeon: Kareem Gramajo MD;  Location: [facility] ENDO;  Service: Endoscopy;  Laterality: N/A;    FRACTURE SURGERY      right hip     HERNIA REPAIR      groin    HYSTERECTOMY      INSERTION OF IMPLANTABLE LOOP RECORDER N/A 12/12/2018    Procedure: Insertion, Implantable Loop Recorder;  Surgeon: Ashok Herbert MD;  Location: [facility] CATH LAB;  Service: Cardiology;  Laterality: N/A;    PERCUTANEOUS PINNING OF HIP Left 3/23/2019    Procedure: PINNING, HIP, PERCUTANEOUS;  Surgeon: Jorje Hamlin MD;  Location: [facility] OR;  Service: Orthopedics;  Laterality: Left;    SMALL BOWEL ENTEROSCOPY N/A 10/29/2020    Procedure: ENTEROSCOPY;  Surgeon: Singh Kee III, MD;  Location: [facility];  Service: Endoscopy;  Laterality: N/A;    VARICOSE VEIN SURGERY         ALCOHOL, TOBACCO AND DRUG USE  Social History     Tobacco Use   Smoking Status Former    Packs/day: 0.35    Years: 44.00    Pack years: 15.40    Types: Cigarettes    Start date: 1970   Smokeless Tobacco Never   Tobacco Comments    1/08/2015     Social History     Substance and Sexual Activity   Alcohol Use No    Alcohol/week: 0.0 standard drinks     Social History     Substance and Sexual Activity   Drug Use No       FAMILY HISTORY  Family History   Problem Relation Age of Onset    Hypertension Mother     Heart disease Mother     Lung disease Father     Diabetes Sister     Diabetes Brother     Kidney disease Son     Breast cancer Neg Hx     Ovarian cancer Neg Hx        VITAL SIGNS (MOST RECENT)  Temp: 97.9 °F (36.6 °C) (10/19/22 0400)  Pulse: 65 (10/19/22  0813)  Resp: 18 (10/19/22 0813)  BP: 133/65 (10/19/22 0700)  SpO2: 100 % (10/19/22 0813)    INTAKE AND OUTPUT (LAST 24 HOURS):  Intake/Output Summary (Last 24 hours) at 10/19/2022 0919  Last data filed at 10/19/2022 0600  Gross per 24 hour   Intake 600 ml   Output 800 ml   Net -200 ml       WEIGHT  Wt Readings from Last 1 Encounters:   10/16/22 56.5 kg (124 lb 9 oz)       PHYSICAL EXAM  GENERAL: A&O. Thin elderly woman in NAD.  HEENT: Extraocular movements intact. Pharynx moist.  NECK: Supple. No JVD or hepatojugular reflux.  HEART: Regular rate and normal rhythm. No murmur or gallop auscultated.  LUNGS: Mild expiratory wheezing occasionally. Very soft bibasilar crackles.  ABDOMEN: Soft, non-tender, non-distended, no masses palpated.  EXTREMITIES: Normal muscle tone and joint movement, no cyanosis or clubbing.   LYMPHATICS: No adenopathy palpated, no edema.  SKIN: Dry, intact, no lesions.   NEURO: No gross deficit.  PSYCH: Appropriate affect    ACUTE PHASE REACTANT (LAST 24 HOURS)  No results for input(s): FERRITIN, CRP, LDH, DDIMER in the last 24 hours.    CBC LAST (LAST 24 HOURS)  Recent Labs   Lab 10/19/22  0405   WBC 8.02   RBC 2.44*   HGB 7.8*   HCT 24.8*   *   MCH 32.0*   MCHC 31.5*   RDW 13.9      MPV 11.4   GRAN 71.3  5.7   LYMPH 16.8*  1.4   MONO 10.7  0.9   BASO 0.02   NRBC 0       CHEMISTRY LAST (LAST 24 HOURS)  Recent Labs   Lab 10/19/22  0405      K 4.0      CO2 27   ANIONGAP 8   BUN 55*   CREATININE 2.0*      CALCIUM 8.3*   ALBUMIN 2.6*   PROT 5.3*   ALKPHOS 60   ALT 20   AST 27   BILITOT 0.6       COAGULATION LAST (LAST 24 HOURS)  No results for input(s): LABPT, INR, APTT in the last 24 hours.    CARDIAC PROFILE (LAST 24 HOURS)  Recent Labs   Lab 10/16/22  1734 10/16/22  2121   BNP 2,653*  --    TROPONINI 0.404* 0.389*       LAST 7 DAYS MICROBIOLOGY   Microbiology Results (last 7 days)       Procedure Component Value Units Date/Time    Blood culture x two cultures.  Draw prior to antibiotics. [[id number]] Collected: 10/16/22 1840    Order Status: Completed Specimen: Blood from Peripheral, Hand, Right Updated: 10/18/22 2032     Blood Culture, Routine No Growth to date      No Growth to date      No Growth to date    Narrative:      Aerobic and anaerobic    Blood culture x two cultures. Draw prior to antibiotics. [[id number]] Collected: 10/16/22 1945    Order Status: Completed Specimen: Blood from Peripheral, Antecubital, Right Updated: 10/18/22 2032     Blood Culture, Routine No Growth to date      No Growth to date      No Growth to date    Narrative:      Aerobic and anaerobic            MOST RECENT IMAGING  X-Ray Chest AP Portable  Chest single view    CLINICAL DATA: Shortness of breath    FINDINGS: AP view is compared to October 18.    Cardiomegaly is stable. The aortic arch is calcified. The lungs are hyperinflated. Atelectasis or infiltrate at the right lung base appears mildly improved. Tiny bilateral pleural effusions are stable.    IMPRESSION:  1. Improving atelectasis or infiltrate at the right lung base. No other significant changes.    Electronically signed by:  Ariel Barr MD  10/19/2022 6:35 AM CDT Workstation: [id number]      CURRENT VISIT EKG  Results for orders placed or performed during the hospital encounter of 10/16/22   EKG 12-LEAD    Narrative    Test Reason : R07.9,    Vent. Rate : 069 BPM     Atrial Rate : 069 BPM     P-R Int : 184 ms          QRS Dur : 090 ms      QT Int : 430 ms       P-R-T Axes : 052 038 -48 degrees     QTc Int : 460 ms    Normal sinus rhythm  Anteroseptal infarct (cited on or before 20-JUL-2022)  Abnormal ECG  When compared with ECG of 20-JUL-2022 17:02,  Questionable change in initial forces of Anterior leads  Non-specific change in ST segment in Anterior leads  Nonspecific T wave abnormality now evident in Inferior leads  T wave inversion now evident in Anterior leads    Referred By: [email or web address]   SELF           Confirmed By:       Echo 10/17/22  Moderate concentric hypertrophy and mildly decreased systolic function.  The estimated ejection fraction is 45%.  There are segmental left ventricular wall motion abnormalities. Anterior apical akinesia  Moderate to severe tricuspid regurgitation.  Moderate-to-severe mitral regurgitation.  There is moderate to severe pulmonary hypertension.  Severe left atrial enlargement.  Grade II left ventricular diastolic dysfunction.  Atrial fibrillation not observed.  There is right ventricular hypertrophy.  There is mild mitral stenosis. Mitral valve not well seen because of heavy mitral annular calcification      RESPIRATORY SUPPORT   NC    LAST ARTERIAL BLOOD GAS  ABG  No results for input(s): PH, PO2, PCO2, HCO3, BE in the last 168 hours.    PFTs (10/30/2017)  FEV1/FVC 0.53, FEV1 53%, FVC 75%  With less than significant bronchodilator response.    IMPRESSION AND PLAN  Blaire Cage is a 89 y.o. 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    #Acute on chronic hypoxic respiratory failure  #CAP  #Acute decompensated HFrEF  #Eosinophilic asthma  #COPD 2D with acute exacerbation  #Likely pulmonary hypertension, likely group 2 and 3  - continue DuoNebs  - ICS nebs inpatient  - resume Trelegy upon discharge  - continue levofloxacin for 5-day course  - agree with diuresis and GDMT for CHF      Adithya Pena MD  [facility]  Department of Pulmonology  Date of Service: 10/19/2022  11:49 AM

## 2022-10-19 NOTE — PROGRESS NOTES
[facility] Medicine  Progress Note    Patient name: Blaire Cage  MRN: [id number]  Admit Date: 10/16/2022   LOS: 0 days     SUBJECTIVE:     Principal problem: Pneumonia    Interval History:  Patient transferred to step down unit overnight after developing respiratory distress with reports of difficulty weaning her from bipap.  Being treated for pneumonia with IV abx. Repeat CXR with unchanged appearing RLL opacity.  Her BNP is elevated at 2653 on admission which is significantly higher than her previous (278 on 3/22).  She was started on IV lasix given concern for HF. She has only put out 900 at the time of this note writing. Troponin mildly elevated but static.  Echo ordered and EF 45% with segmental LV wall motion abnormalities and anterior apical akinesia which appears much different from her 3/2022 echo. Echo also reveals mod to severe TR, mod to severe MR and mod to severe pulm HTN which appears worse from prior.      She was able to be weaned off bipap.  I was able to wean her to room air though saturations 94% on 1L and 91-92% on 2L.  She only wears O2 at night per her report.  SOB feels better but cough has been bothersome. When she is in a coughing fit she gets very anxious- ativan ordered for this though caution given her age. I have consulted her cardiologist given the echo changes. Pulmonary consulted and I appreciate the input.     Hospital Course:    Scheduled Meds:   ascorbic acid (vitamin C)  500 mg Oral Daily    atorvastatin  40 mg Oral Daily    budesonide  0.5 mg Nebulization Q12H    diltiaZEM  180 mg Oral Daily    enoxparin  1 mg/kg Subcutaneous Q24H    furosemide (LASIX) injection  20 mg Intravenous Q12H    lactulose (CHRONULAC) 200 g with sodium chloride 0.9% ENEMA   Rectal TID    levoFLOXacin  500 mg Intravenous Q48H    levothyroxine  88 mcg Oral Before breakfast    losartan  25 mg Oral Daily    montelukast  10 mg Oral QHS    sertraline  50 mg Oral Daily      Continuous Infusions:  PRN Meds:albuterol-ipratropium, dextrose 10%, dextrose 10%, glucagon (human recombinant), insulin aspart U-100, LORazepam, melatonin, ondansetron, sodium chloride 0.9%, sodium chloride 0.9%    Review of patient's allergies indicates:   Allergen Reactions    Carvedilol Other (See Comments)     Bradycardia and syncope    Boniva [ibandronate]     Codeine     Hydralazine analogues     Iodinated contrast media Hives    Kionex [sodium polystyrene sulfonate] Diarrhea     From encounter 12/11/17 for diarrhea--not true allergy.    Lisinopril     Morphine        Review of Systems: As per interval history    OBJECTIVE:     Vital Signs (Most Recent)  Temp: 98 °F (36.7 °C) (10/18/22 2000)  Pulse: 77 (10/18/22 2048)  Resp: 18 (10/18/22 2048)  BP: (!) 154/68 (10/18/22 2000)  SpO2: 97 % (10/18/22 2048)    Vital Signs Range (Last 24H):  Temp:  [97.4 °F (36.3 °C)-98.1 °F (36.7 °C)]   Pulse:  [73-91]   Resp:  [18-40]   BP: (138-165)/(65-77)   SpO2:  [67 %-100 %]     I & O (Last 24H):  Intake/Output Summary (Last 24 hours) at 10/18/2022 2057  Last data filed at 10/18/2022 1830  Gross per 24 hour   Intake 360 ml   Output 1000 ml   Net -640 ml       Physical Exam:    Vitals and nursing note reviewed.     Constitutional:       General: Not in acute distress.     Appearance: Well-developed.   HENT:      Head: Normocephalic and atraumatic.   Eyes:      Pupils: Pupils are equal, round, and reactive to light.   Cardiovascular:      Rate and Rhythm: Regular rhythm.   No LE edema  Pulmonary:      Effort: Pulmonary effort is normal.      Breath sounds: Normal breath sounds. No wheezing.   O2 per NC  Abdominal:      General: There is no distension.      Palpations: Abdomen is soft.      Tenderness: There is no abdominal tenderness. There is no guarding or rebound.   Musculoskeletal:         General: Normal range of motion.      Cervical back: Normal range of motion.   Skin:     Findings: No rash.   Neurological:       Mental Status: Alert and oriented to person, place, and time.      Cranial Nerves: No cranial nerve deficit.      Sensory: No sensory deficit.     Laboratory:  I have reviewed all pertinent lab results within the past 24 hours.  CBC:   Recent Labs   Lab 10/18/22  0417   WBC 11.05   RBC 2.72*   HGB 8.7*   HCT 27.4*      *   MCH 32.0*   MCHC 31.8*     CMP:   Recent Labs   Lab 10/18/22  1108   *   CALCIUM 8.6*   ALBUMIN 2.8*   PROT 5.7*      K 4.0   CO2 23      BUN 49*   CREATININE 1.9*   ALKPHOS 65   ALT 22   AST 30   BILITOT 0.5     Cardiac markers:   Recent Labs   Lab 10/16/22  2121   TROPONINI 0.389*     Microbiology Results (last 7 days)       Procedure Component Value Units Date/Time    Blood culture x two cultures. Draw prior to antibiotics. [[id number]] Collected: 10/16/22 1840    Order Status: Completed Specimen: Blood from Peripheral, Hand, Right Updated: 10/18/22 2032     Blood Culture, Routine No Growth to date      No Growth to date      No Growth to date    Narrative:      Aerobic and anaerobic    Blood culture x two cultures. Draw prior to antibiotics. [[id number]] Collected: 10/16/22 1945    Order Status: Completed Specimen: Blood from Peripheral, Antecubital, Right Updated: 10/18/22 2032     Blood Culture, Routine No Growth to date      No Growth to date      No Growth to date    Narrative:      Aerobic and anaerobic            Diagnostic Results:  X-Ray: Reviewed  CXR with unchanged RLL opacity     ASSESSMENT/PLAN:         Active Hospital Problems    Diagnosis  POA    *Pneumonia [J18.9]  Yes    Acute on chronic combined systolic and diastolic heart failure [I50.43]  Yes      Resolved Hospital Problems   No resolved problems to display.         Plan:     -empiric tx with IV abx as CXR appears more pneumonia than pulmonary edema given the asymetry  -Elevated BNP and new echo findings are more convincing of acute HF, however.  On IV lasix and Cardiology consulted. She is  not putting out a whole lot of urine. Close following of renal function.   -? Any thought to be PE given significant BNP rise.  I've put her on full dose lovenox.  Brief chart review reveals prior Hx of DVT.  I will do a deeper review.  Not a good candidate for CTA chest given renal function.  I do not think VQ scan will be helpful given abnormal CXR.  Consideration for LE US.  -Strict ins and outs  -Tele monitoring  -Attempting to wean her off supplemental oxygen during the day per her baseline.      VTE Risk Mitigation (From admission, onward)           Ordered     enoxaparin injection 60 mg  Every 24 hours (non-standard times)         10/18/22 1102     IP VTE LOW RISK PATIENT  Once         10/16/22 2046     Place sequential compression device  Until discontinued         10/16/22 2046                      Department Hospital Medicine  [facility]  Joey Ramsey MD  Date of service: 10/18/2022

## 2022-10-19 NOTE — PLAN OF CARE
10/19/22 0813   Patient Assessment/Suction   Level of Consciousness (AVPU) alert   Respiratory Effort Normal;Unlabored   All Lung Fields Breath Sounds wheezes, expiratory   Skin Integrity   $ Wound Care Tech Time 15 min   Area Observed Bridge of nose   Skin Appearance without discoloration   PRE-TX-O2   O2 Device (Oxygen Therapy) nasal cannula   $ Is the patient on Low Flow Oxygen? Yes   Flow (L/min) 3   SpO2 100 %   Pulse Oximetry Type Continuous   $ Pulse Oximetry - Multiple Charge Pulse Oximetry - Multiple   Pulse 65   Resp 18   Aerosol Therapy   $ Aerosol Therapy Charges Aerosol Treatment   Daily Review of Necessity (SVN) completed   Respiratory Treatment Status (SVN) given   Treatment Route (SVN) mask;oxygen   Patient Position (SVN) Shetty's;HOB elevated   Post Treatment Assessment (SVN) breath sounds unchanged   Signs of Intolerance (SVN) none   Inhaler   $ Inhaler Charges MDI (Metered Dose Inahler) Treatment   Daily Review of Necessity (Inhaler) completed   Respiratory Treatment Status (Inhaler) given;mouth rinsed post treatment   Treatment Route (Inhaler) mouthpiece   Patient Position (Inhaler) HOB elevated;Shetty's   Post Treatment Assessment (Inhaler) breath sounds unchanged   Signs of Intolerance (Inhaler) none   Preset CPAP/BiPAP Settings   Mode Of Delivery Standby   $ CPAP/BiPAP Daily Charge BiPAP/CPAP Daily   $ Initial CPAP/BiPAP Setup? No   Equipment Type V60   Ipap 10   EPAP (cm H2O) 5   Pressure Support (cm H2O) 5   Education   $ Education Bronchodilator;15 min;BiPAP   Respiratory Evaluation   $ Care Plan Tech Time 15 min

## 2022-10-19 NOTE — PT/OT/SLP EVAL
"Physical Therapy Evaluation    Patient Name:  Blaire Cage   MRN:  [id number]    Recommendations:     Discharge Recommendations:  home health PT   Discharge Equipment Recommendations: none   Barriers to discharge: None    Assessment:     Blaire Cage is a 89 y.o. female admitted with a medical diagnosis of Pneumonia.  She presents with the following impairments/functional limitations:  weakness, impaired endurance, impaired self care skills, impaired functional mobility, gait instability, impaired balance, decreased safety awareness, impaired cardiopulmonary response to activity.    Pt found HOB elevated and agreeable to working with PT despite not feeling well. Pt lethargic & O x 3 and has the following co-morbidities: Heart Failure, CKD, DM w/ neuropathy, HTN.  Pt tolerated session fairly and required moderate to minimal A for safe mobilization during session today, losing her balance standing at RW with PT providing moderate A to return to upright standing. Pt would benefit from acute PT during hospitalization to increase strength, endurance and safety with mobility and would benefit from HH PT prior to discharge home.      Rehab Prognosis: Fair; patient would benefit from acute skilled PT services to address these deficits and reach maximum level of function.    Recent Surgery: * No surgery found *      Plan:     During this hospitalization, patient to be seen 5 x/week to address the identified rehab impairments via gait training, therapeutic activities, therapeutic exercises and progress toward the following goals:    Plan of Care Expires:  11/23/22    Subjective     Chief Complaint: fatigue and "not feeling well."  Patient/Family Comments/goals: home with daughter to assist as prior to admit  Pain/Comfort:  Pain Rating 1: 0/10  Pain Rating Post-Intervention 1: 0/10    Patients cultural, spiritual, Bahai conflicts given the current situation:      Living Environment:  Pt lives with her daughter in a " awaiting radiology single story house with no steps to enter.   Prior to admission, patients level of function was Supervision with rollator.  Equipment used at home: shower chair, wheelchair, rollator, oxygen, bath bench.  DME owned (not currently used): none.  Upon discharge, patient will have assistance from her daughter.    Objective:     Communicated with RN prior to session.  Patient found HOB elevated with bed alarm, PureWick, peripheral IV, pulse ox (continuous), telemetry, oxygen  upon PT entry to room.    General Precautions: Standard, fall, diabetic, respiratory   Orthopedic Precautions:N/A   Braces: N/A  Respiratory Status: Nasal cannula, flow 3.5 L/min    Exams:  Cognitive Exam:  Patient is oriented to Person, Place, and Time  RLE ROM: WFL  RLE Strength: grossly 4 to 4-/5  LLE ROM: WFL  LLE Strength: grossly 4 to 4-5    Functional Mobility:  Bed Mobility:     Supine to Sit: moderate assistance  Sit to Supine: moderate assistance  Transfers:     Sit to Stand:  moderate assistance with rolling walker  Bed to Chair: minimum assistance and moderate assistance with  rolling walker  using  Step Transfer  Gait: x 5 feet forward then pt turned to sit in chair. Just prior to sitting pt lost her balance to R side needing moderate A from PT to recover to upright standing position. Pt assisted to sitting in chair while PT/tech gathered clean linens, gown, brief, etc  Balance: poor to Fair - with RW due to generalized weakness.      AM-PAC 6 CLICK MOBILITY  Total Score:13       Treatment & Education:  PT assisted pt to standing and provided moderate A while pt's daughter cleaned pt after found with Purewick having not kept pt dry/linens wet. Once pt clean with brief donned PT assisted pt via step turn transfer to EOB and back into bed/positioned for comfort.    Pt was educated on the following: call light use, importance of OOB activity and functional mobility to negate the negative effects of prolonged bed rest during this  hospitalization, safe transfers/ambulation and discharge planning recommendations/options.      Patient left HOB elevated with all lines intact, call button in reach, bed alarm on, RN notified, and pt's daughter present.    GOALS:   Multidisciplinary Problems       Physical Therapy Goals          Problem: Physical Therapy    Goal Priority Disciplines Outcome Goal Variances Interventions   Physical Therapy Goal     PT, PT/OT      Description: Goals to be met by: 22     Patient will increase functional independence with mobility by performin. Supine to sit with Supervision  2. Sit to stand transfer with Supervision  3. Bed to chair transfer with Supervision using Rolling Walker  4. Gait  x 50 feet with Supervision using Rolling Walker.                              History:     Past Medical History:   Diagnosis Date    Anemia due to multiple mechanisms 2018    Anemia, chronic disease 2018    Anemia, chronic renal failure, stage 3 (moderate) 2018    Anticoagulant long-term use     aspirin    Aortic aneurysm     Chest pain     CHF (congestive heart failure)     COPD (chronic obstructive pulmonary disease)     COPD with acute exacerbation 2015    Depression     Diabetes mellitus type II     no longer on any DM meds    DVT (deep venous thrombosis) 2018    Encounter for blood transfusion     GERD (gastroesophageal reflux disease)     GI bleed     Gout     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 2018    Hip arthritis 3/1/2016    Homocysteinemia 2018    Hyperlipidemia     Hypertension     Incontinent of urine     Normocytic normochromic anemia 2018    Oxygen dependent     use oxygen as needed    PE (pulmonary thromboembolism) 2018    Pneumonia of right lower lobe due to infectious organism 2017    Syncope     Thyroid disease     hypothyroid    Type 2 diabetes mellitus with stage 3 chronic kidney disease 2013    UTI (urinary tract infection)        Past Surgical  History:   Procedure Laterality Date    APPENDECTOMY      CHOLECYSTECTOMY      COLONOSCOPY Left 10/29/2020    Procedure: COLONOSCOPY;  Surgeon: Singh Kee III, MD;  Location: [facility];  Service: Endoscopy;  Laterality: Left;    ESOPHAGOGASTRODUODENOSCOPY Left 10/26/2020    Procedure: EGD (ESOPHAGOGASTRODUODENOSCOPY);  Surgeon: Kareem Gramajo MD;  Location: [facility] ENDO;  Service: Endoscopy;  Laterality: Left;    ESOPHAGOGASTRODUODENOSCOPY Left 10/28/2020    Procedure: EGD (ESOPHAGOGASTRODUODENOSCOPY);  Surgeon: Kareem Gramajo MD;  Location: [facility] ENDO;  Service: Endoscopy;  Laterality: Left;    ESOPHAGOGASTRODUODENOSCOPY N/A 9/16/2021    Procedure: EGD (ESOPHAGOGASTRODUODENOSCOPY);  Surgeon: Kareem Gramajo MD;  Location: [facility];  Service: Endoscopy;  Laterality: N/A;    FRACTURE SURGERY      right hip     HERNIA REPAIR      groin    HYSTERECTOMY      INSERTION OF IMPLANTABLE LOOP RECORDER N/A 12/12/2018    Procedure: Insertion, Implantable Loop Recorder;  Surgeon: Ashok Herbert MD;  Location: [facility] CATH LAB;  Service: Cardiology;  Laterality: N/A;    PERCUTANEOUS PINNING OF HIP Left 3/23/2019    Procedure: PINNING, HIP, PERCUTANEOUS;  Surgeon: Jorje Hamlin MD;  Location: [facility] OR;  Service: Orthopedics;  Laterality: Left;    SMALL BOWEL ENTEROSCOPY N/A 10/29/2020    Procedure: ENTEROSCOPY;  Surgeon: Singh Kee III, MD;  Location: [facility];  Service: Endoscopy;  Laterality: N/A;    VARICOSE VEIN SURGERY         Time Tracking:     PT Received On: 10/19/22  PT Start Time: 0950     PT Stop Time: 1025  PT Total Time (min): 35 min     Billable Minutes: Evaluation 10 and Therapeutic Activity 25      10/19/2022

## 2022-10-20 NOTE — PROGRESS NOTES
Pulmonary/Critical Care Progress Note      PATIENT NAME: Blaire Cage  MRN: [id number]  TODAY'S DATE: 10/20/2022  11:49 AM  ADMIT DATE: 10/16/2022  AGE: 89 y.o. : 1930    CONSULT REQUESTED BY: Joey Ramsey MD    REASON FOR CONSULT:   COPD exacerbation, CAP    HISTORY OF PRESENT ILLNESS   Blaire Cage is a 89 y.o. 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    The patient presented the day before yesterday with shortness of breath. She was found to have right lower lung opacities on CXR and very elevated BNP. She has been given diuresis, Abx, steroids, and bronchodilators. She was stepped up to the step-down unit on BiPAP, but this was quickly weaned off. The patient feels much better now.    10/19/22: Feeling overall better today. Less SOB. No pain. Appreciative of care.    10/20/22: Feeling some abdominal discomfort after enema for 5 days of constipation. No SOB or chest pain.     SMOKING HISTORY  Current 3 cigarette/day smoker. No interest in quitting.    REVIEW OF SYSTEMS  GENERAL: Feeling well. No fevers, chills, or night sweats.  EYES: Vision is good.  ENT: No sinusitis or pharyngitis.   HEART: No chest pain or palpitations.  LUNGS: No cough, sputum, or wheezing.  GI: No abdominal pain, nausea, vomiting, or diarrhea.  : No dysuria, urgency, or frequency.  SKIN: No lesions or rashes.  MUSCULOSKELETAL: No joint pain or myalgias.  NEURO: No headaches or neuropathy.  LYMPH: No edema or adenopathy.  PSYCH: No anxiety or depression.  ENDO: No unexpected weight change.    ALLERGIES  Review of patient's allergies indicates:   Allergen Reactions    Carvedilol Other (See Comments)     Bradycardia and syncope    Boniva [ibandronate]     Codeine     Hydralazine analogues     Iodinated contrast media Hives    Kionex [sodium polystyrene sulfonate] Diarrhea     From  encounter 12/11/17 for diarrhea--not true allergy.    Lisinopril     Morphine        INPATIENT SCHEDULED MEDICATIONS   ascorbic acid (vitamin C)  500 mg Oral Daily    atorvastatin  40 mg Oral Daily    diltiaZEM  180 mg Oral Daily    enoxparin  1 mg/kg Subcutaneous Q24H    furosemide (LASIX) injection  20 mg Intravenous Daily    isosorbide mononitrate  30 mg Oral Daily    lactulose (CHRONULAC) 200 g with sodium chloride 0.9% ENEMA   Rectal TID    levoFLOXacin  500 mg Intravenous Q48H    levothyroxine  88 mcg Oral Before breakfast    losartan  25 mg Oral Daily    montelukast  10 mg Oral QHS    NON FORMULARY MEDICATION 1 puff  1 puff Inhalation Daily    sertraline  50 mg Oral Daily         MEDICAL AND SURGICAL HISTORY  Past Medical History:   Diagnosis Date    Anemia due to multiple mechanisms 6/29/2018    Anemia, chronic disease 6/29/2018    Anemia, chronic renal failure, stage 3 (moderate) 6/29/2018    Anticoagulant long-term use     aspirin    Aortic aneurysm     Chest pain     CHF (congestive heart failure)     COPD (chronic obstructive pulmonary disease)     COPD with acute exacerbation 1/9/2015    Depression     Diabetes mellitus type II     no longer on any DM meds    DVT (deep venous thrombosis) 06/09/2018    Encounter for blood transfusion     GERD (gastroesophageal reflux disease)     GI bleed     Gout     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 8/7/2018    Hip arthritis 3/1/2016    Homocysteinemia 8/7/2018    Hyperlipidemia     Hypertension     Incontinent of urine     Normocytic normochromic anemia 6/29/2018    Oxygen dependent     use oxygen as needed    PE (pulmonary thromboembolism) 06/09/2018    Pneumonia of right lower lobe due to infectious organism 9/11/2017    Syncope     Thyroid disease     hypothyroid    Type 2 diabetes mellitus with stage 3 chronic kidney disease 1/18/2013    UTI (urinary tract infection)      Past Surgical History:   Procedure Laterality Date    APPENDECTOMY      CHOLECYSTECTOMY       COLONOSCOPY Left 10/29/2020    Procedure: COLONOSCOPY;  Surgeon: Singh Kee III, MD;  Location: [facility] [place] [place];  Service: Endoscopy;  Laterality: Left;    ESOPHAGOGASTRODUODENOSCOPY Left 10/26/2020    Procedure: EGD (ESOPHAGOGASTRODUODENOSCOPY);  Surgeon: Kareem Gramajo MD;  Location: [facility] ENDO;  Service: Endoscopy;  Laterality: Left;    ESOPHAGOGASTRODUODENOSCOPY Left 10/28/2020    Procedure: EGD (ESOPHAGOGASTRODUODENOSCOPY);  Surgeon: Kareem Gramajo MD;  Location: [facility] ENDO;  Service: Endoscopy;  Laterality: Left;    ESOPHAGOGASTRODUODENOSCOPY N/A 9/16/2021    Procedure: EGD (ESOPHAGOGASTRODUODENOSCOPY);  Surgeon: Kareem Gramajo MD;  Location: [facility] [place] [place];  Service: Endoscopy;  Laterality: N/A;    FRACTURE SURGERY      right hip     HERNIA REPAIR      groin    HYSTERECTOMY      INSERTION OF IMPLANTABLE LOOP RECORDER N/A 12/12/2018    Procedure: Insertion, Implantable Loop Recorder;  Surgeon: Ashok Herbert MD;  Location: [facility] CATH LAB;  Service: Cardiology;  Laterality: N/A;    PERCUTANEOUS PINNING OF HIP Left 3/23/2019    Procedure: PINNING, HIP, PERCUTANEOUS;  Surgeon: Jorje Hamlin MD;  Location: [facility] OR;  Service: Orthopedics;  Laterality: Left;    SMALL BOWEL ENTEROSCOPY N/A 10/29/2020    Procedure: ENTEROSCOPY;  Surgeon: Singh Kee III, MD;  Location: [facility] [place] [place];  Service: Endoscopy;  Laterality: N/A;    VARICOSE VEIN SURGERY         ALCOHOL, TOBACCO AND DRUG USE  Social History     Tobacco Use   Smoking Status Former    Packs/day: 0.35    Years: 44.00    Pack years: 15.40    Types: Cigarettes    Start date: 1970   Smokeless Tobacco Never   Tobacco Comments    1/08/2015     Social History     Substance and Sexual Activity   Alcohol Use No    Alcohol/week: 0.0 standard drinks     Social History     Substance and Sexual Activity   Drug Use No       FAMILY HISTORY  Family History   Problem Relation Age of Onset    Hypertension Mother     Heart disease Mother     Lung disease Father      Diabetes Sister     Diabetes Brother     Kidney disease Son     Breast cancer Neg Hx     Ovarian cancer Neg Hx        VITAL SIGNS (MOST RECENT)  Temp: 97.6 °F (36.4 °C) (10/20/22 0809)  Pulse: 70 (10/20/22 0929)  Resp: 18 (10/20/22 0929)  BP: (!) 141/65 (10/20/22 0809)  SpO2: 100 % (10/20/22 0929)    INTAKE AND OUTPUT (LAST 24 HOURS):  Intake/Output Summary (Last 24 hours) at 10/20/2022 0949  Last data filed at 10/20/2022 0611  Gross per 24 hour   Intake 750 ml   Output 600 ml   Net 150 ml       WEIGHT  Wt Readings from Last 1 Encounters:   10/16/22 56.5 kg (124 lb 9 oz)       PHYSICAL EXAM  GENERAL: A&O. Thin elderly woman in NAD.  HEENT: Extraocular movements intact. Pharynx moist.  NECK: Supple. No JVD or hepatojugular reflux.  HEART: Regular rate and normal rhythm. No murmur or gallop auscultated.  LUNGS: Mild expiratory wheezing occasionally. Very soft bibasilar crackles.  ABDOMEN: Soft, non-tender, non-distended, no masses palpated.  EXTREMITIES: Normal muscle tone and joint movement, no cyanosis or clubbing.   LYMPHATICS: No adenopathy palpated, no edema.  SKIN: Dry, intact, no lesions.   NEURO: No gross deficit.  PSYCH: Appropriate affect    ACUTE PHASE REACTANT (LAST 24 HOURS)  No results for input(s): FERRITIN, CRP, LDH, DDIMER in the last 24 hours.    CBC LAST (LAST 24 HOURS)  Recent Labs   Lab 10/20/22  0524   WBC 7.78   RBC 2.64*   HGB 8.4*   HCT 26.9*   *   MCH 31.8*   MCHC 31.2*   RDW 14.1      MPV 11.6   GRAN 72.7  5.7   LYMPH 14.4*  1.1   MONO 11.2  0.9   BASO 0.03   NRBC 0       CHEMISTRY LAST (LAST 24 HOURS)  Recent Labs   Lab 10/20/22  0524      K 4.2      CO2 25   ANIONGAP 7*   BUN 62*   CREATININE 2.1*   *   CALCIUM 8.0*   ALBUMIN 2.6*   PROT 5.3*   ALKPHOS 66   ALT 20   AST 26   BILITOT 0.6       COAGULATION LAST (LAST 24 HOURS)  No results for input(s): LABPT, INR, APTT in the last 24 hours.    CARDIAC PROFILE (LAST 24 HOURS)  Recent Labs   Lab  10/19/22  1745   BNP 3,449*   TROPONINI 1.370*       LAST 7 DAYS MICROBIOLOGY   Microbiology Results (last 7 days)       Procedure Component Value Units Date/Time    Blood culture x two cultures. Draw prior to antibiotics. [[id number]] Collected: 10/16/22 1840    Order Status: Completed Specimen: Blood from Peripheral, Hand, Right Updated: 10/19/22 2032     Blood Culture, Routine No Growth to date      No Growth to date      No Growth to date      No Growth to date    Narrative:      Aerobic and anaerobic    Blood culture x two cultures. Draw prior to antibiotics. [[id number]] Collected: 10/16/22 1945    Order Status: Completed Specimen: Blood from Peripheral, Antecubital, Right Updated: 10/19/22 2032     Blood Culture, Routine No Growth to date      No Growth to date      No Growth to date      No Growth to date    Narrative:      Aerobic and anaerobic            MOST RECENT IMAGING  X-Ray Chest PA And Lateral  Reason: chf Shortness of Breath; Weakness    FINDINGS:  PA and lateral chest is compared to 10/19/2022 show cardiomegaly. There is a heart monitor device.    There are tiny pleural effusions with bibasilar airspace disease suggestive of atelectasis versus infiltrates. The upper lobes are clear. Pulmonary vasculature is normal. Bones are normal.    IMPRESSION:  Mild cardiomegaly with tiny pleural effusions and bibasilar atelectasis or infiltrates    Electronically signed by:  Jonna Lyn MD  10/20/2022 7:28 AM CDT Workstation: [id number]      CURRENT VISIT EKG  Results for orders placed or performed during the hospital encounter of 10/16/22   EKG 12-LEAD    Narrative    Test Reason : R07.9,    Vent. Rate : 069 BPM     Atrial Rate : 069 BPM     P-R Int : 184 ms          QRS Dur : 090 ms      QT Int : 430 ms       P-R-T Axes : 052 038 -48 degrees     QTc Int : 460 ms    Normal sinus rhythm  Anteroseptal infarct (cited on or before 20-JUL-2022)  Abnormal ECG  When compared with ECG of 20-JUL-2022  17:02,  Questionable change in initial forces of Anterior leads  Non-specific change in ST segment in Anterior leads  Nonspecific T wave abnormality now evident in Inferior leads  T wave inversion now evident in Anterior leads    Referred By: [email or web address]   SELF           Confirmed By:      Echo 10/17/22  Moderate concentric hypertrophy and mildly decreased systolic function.  The estimated ejection fraction is 45%.  There are segmental left ventricular wall motion abnormalities. Anterior apical akinesia  Moderate to severe tricuspid regurgitation.  Moderate-to-severe mitral regurgitation.  There is moderate to severe pulmonary hypertension.  Severe left atrial enlargement.  Grade II left ventricular diastolic dysfunction.  Atrial fibrillation not observed.  There is right ventricular hypertrophy.  There is mild mitral stenosis. Mitral valve not well seen because of heavy mitral annular calcification      RESPIRATORY SUPPORT  Oxygen Concentration (%):  [28] 28NC    LAST ARTERIAL BLOOD GAS  ABG  No results for input(s): PH, PO2, PCO2, HCO3, BE in the last 168 hours.    PFTs (10/30/2017)  FEV1/FVC 0.53, FEV1 53%, FVC 75%  With less than significant bronchodilator response.    IMPRESSION AND PLAN  Blaire Cage is a 89 y.o. 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    #Acute on chronic hypoxic respiratory failure  #CAP  #Acute decompensated HFrEF  #Eosinophilic asthma  #COPD 2D with acute exacerbation  #Likely pulmonary hypertension, likely group 2 and 3  - continue DuoNebs  - ICS nebs inpatient  - resume Trelegy upon discharge  - complete levofloxacin for 5-day course  - agree with diuresis and GDMT for CHF    Dispo: Stable for discharge from a pulmonary perspective at the discretion of hospital medicine.    Pulmonary & Critical Care Medicine will sign off at this time.   Please  call with any further questions or concerns.      Adithya Pena MD  [facility]  Department of Pulmonology  Date of Service: 10/20/2022  11:49 AM

## 2022-10-20 NOTE — PLAN OF CARE
10/20/22 0929   Patient Assessment/Suction   Level of Consciousness (AVPU) alert   Respiratory Effort Normal;Unlabored   All Lung Fields Breath Sounds wheezes, expiratory   Skin Integrity   $ Wound Care Tech Time 15 min   Area Observed Bridge of nose   Skin Appearance without discoloration   PRE-TX-O2   O2 Device (Oxygen Therapy) nasal cannula   $ Is the patient on Low Flow Oxygen? Yes   Flow (L/min) 1   SpO2 100 %   Pulse Oximetry Type Continuous   $ Pulse Oximetry - Multiple Charge Pulse Oximetry - Multiple   Pulse 70   Resp 18   Aerosol Therapy   $ Aerosol Therapy Charges Aerosol Treatment   Daily Review of Necessity (SVN) completed   Respiratory Treatment Status (SVN) given   Treatment Route (SVN) mask;oxygen   Patient Position (SVN) Shetty's;sitting in chair   Post Treatment Assessment (SVN) breath sounds improved   Signs of Intolerance (SVN) none   Inhaler   $ Inhaler Charges MDI (Metered Dose Inahler) Treatment   Daily Review of Necessity (Inhaler) completed   Respiratory Treatment Status (Inhaler) given;mouth rinsed post treatment   Treatment Route (Inhaler) mouthpiece   Patient Position (Inhaler) sitting in chair   Post Treatment Assessment (Inhaler) breath sounds unchanged   Signs of Intolerance (Inhaler) none   Preset CPAP/BiPAP Settings   Mode Of Delivery Standby   $ CPAP/BiPAP Daily Charge BiPAP/CPAP Daily   $ Initial CPAP/BiPAP Setup? No   $ Is patient using? Yes   Equipment Type V60   Ipap 10   EPAP (cm H2O) 5   Pressure Support (cm H2O) 5   Education   $ Education Bronchodilator;BiPAP;15 min   Respiratory Evaluation   $ Care Plan Tech Time 15 min

## 2022-10-20 NOTE — PROGRESS NOTES
[facility] Medicine  Progress Note    Patient name: Blaire Cage  MRN: [id number]  Admit Date: 10/16/2022   LOS: 1 day     SUBJECTIVE:     Principal problem: Pneumonia    Interval History:  Patient's Cr increased and only had minimal output and thus I've put Lasix on hold until cardiology evaluation. She overall feels better and less SOB.  She was put on room air and RN reports she did drop to 88% when she was sleeping and thus supplemental oxygen resumed.   Hg decreased to 7.8.  No signs of bleeding at this time.    Hospital Course:     I, Dr. Ramsey, have assumed care 10/18. Patient admitted with suspected pneumonia and started on IV abx. Patient transferred to step down unit overnight after developing respiratory distress with reports of difficulty weaning her from bipap.  Being treated for pneumonia with IV abx. Repeat CXR with unchanged appearing RLL opacity.  Her BNP is elevated at 2653 on admission which is significantly higher than her previous (278 on 3/22).  She was started on IV lasix given concern for HF. She has only put out 900 at the time of this note writing. Troponin mildly elevated but static.  Echo ordered and EF 45% with segmental LV wall motion abnormalities and anterior apical akinesia which appears much different from her 3/2022 echo. Echo also reveals mod to severe TR, mod to severe MR and mod to severe pulm HTN which appears worse from prior. Cardiology consulted.  Bipap able to be weaned.  Supplemental oxygen able to be weaned down.     Scheduled Meds:   ascorbic acid (vitamin C)  500 mg Oral Daily    atorvastatin  40 mg Oral Daily    diltiaZEM  180 mg Oral Daily    enoxparin  1 mg/kg Subcutaneous Q24H    [START ON 10/20/2022] furosemide (LASIX) injection  20 mg Intravenous Daily    [START ON 10/20/2022] isosorbide mononitrate  30 mg Oral Daily    lactulose (CHRONULAC) 200 g with sodium chloride 0.9% ENEMA   Rectal TID    levoFLOXacin  500 mg Intravenous Q48H     levothyroxine  88 mcg Oral Before breakfast    losartan  25 mg Oral Daily    montelukast  10 mg Oral QHS    NON FORMULARY MEDICATION 1 puff  1 puff Inhalation Daily    sertraline  50 mg Oral Daily     Continuous Infusions:  PRN Meds:albuterol-ipratropium, dextrose 10%, dextrose 10%, glucagon (human recombinant), insulin aspart U-100, LORazepam, melatonin, ondansetron, polyethylene glycol, sodium chloride 0.9%, sodium chloride 0.9%    Review of patient's allergies indicates:   Allergen Reactions    Carvedilol Other (See Comments)     Bradycardia and syncope    Boniva [ibandronate]     Codeine     Hydralazine analogues     Iodinated contrast media Hives    Kionex [sodium polystyrene sulfonate] Diarrhea     From encounter 12/11/17 for diarrhea--not true allergy.    Lisinopril     Morphine        Review of Systems: As per interval history    OBJECTIVE:     Vital Signs (Most Recent)  Temp: 97.9 °F (36.6 °C) (10/19/22 1115)  Pulse: 65 (10/19/22 1701)  Resp: 19 (10/19/22 1701)  BP: 133/64 (10/19/22 1701)  SpO2: 98 % (10/19/22 1701)    Vital Signs Range (Last 24H):  Temp:  [97.9 °F (36.6 °C)-98.3 °F (36.8 °C)]   Pulse:  [56-86]   Resp:  [15-30]   BP: (122-166)/(59-75)   SpO2:  [91 %-100 %]     I & O (Last 24H):  Intake/Output Summary (Last 24 hours) at 10/19/2022 1900  Last data filed at 10/19/2022 1801  Gross per 24 hour   Intake 890 ml   Output 400 ml   Net 490 ml         Physical Exam:    Vitals and nursing note reviewed.     Constitutional:       General: Not in acute distress.     Appearance: Well-developed.   HENT:      Head: Normocephalic and atraumatic.   Eyes:      Pupils: Pupils are equal, round, and reactive to light.   Cardiovascular:      Rate and Rhythm: Regular rhythm.   No LE edema  Pulmonary:      Effort: Pulmonary effort is normal.      Breath sounds: Normal breath sounds. No wheezing.   O2 per NC  Abdominal:      General: There is no distension.      Palpations: Abdomen is soft.      Tenderness: There is  no abdominal tenderness. There is no guarding or rebound.   Musculoskeletal:         General: Normal range of motion.      Cervical back: Normal range of motion.   Skin:     Findings: No rash.   Neurological:      Mental Status: Alert and oriented to person, place, and time.      Cranial Nerves: No cranial nerve deficit.      Sensory: No sensory deficit.     Laboratory:  I have reviewed all pertinent lab results within the past 24 hours.  CBC:   Recent Labs   Lab 10/19/22  0405   WBC 8.02   RBC 2.44*   HGB 7.8*   HCT 24.8*      *   MCH 32.0*   MCHC 31.5*       CMP:   Recent Labs   Lab 10/19/22  0405      CALCIUM 8.3*   ALBUMIN 2.6*   PROT 5.3*      K 4.0   CO2 27      BUN 55*   CREATININE 2.0*   ALKPHOS 60   ALT 20   AST 27   BILITOT 0.6       Cardiac markers:   Recent Labs   Lab 10/19/22  1745   TROPONINI 1.370*       Microbiology Results (last 7 days)       Procedure Component Value Units Date/Time    Blood culture x two cultures. Draw prior to antibiotics. [[id number]] Collected: 10/16/22 1840    Order Status: Completed Specimen: Blood from Peripheral, Hand, Right Updated: 10/18/22 2032     Blood Culture, Routine No Growth to date      No Growth to date      No Growth to date    Narrative:      Aerobic and anaerobic    Blood culture x two cultures. Draw prior to antibiotics. 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         Plan:      -empiric tx with IV abx as CXR appears more pneumonia than pulmonary edema given the asymetry. I agree with  pulmonary and will commit patient to five day course of abx.   -Elevated BNP and new echo findings are more convincing of acute HF, however.  Started IV lasix and Cardiology consulted. She is not putting out a whole lot of urine. Renal function climbing and thus I have put lasix on hold until seen by cardiology.  At the time of this note writing, Cardiology has ordered a one time dose and will reassess renal function in the AM.  -Imdur added per Cardiology  -BNP and troponin repeated and worse.  -CXR repeated and actually a little better  -? Any thought to be PE given significant BNP rise.  I've put her on full dose lovenox.  Brief chart review reveals prior Hx of DVT.  I will do a deeper review.  Not a good candidate for CTA chest given renal function.  I do not think VQ scan will be helpful given abnormal CXR.  Consideration for LE US.  -Strict ins and outs  -Tele monitoring  -Attempting to wean her off supplemental oxygen during the day per her baseline.      VTE Risk Mitigation (From admission, onward)           Ordered     enoxaparin injection 60 mg  Every 24 hours (non-standard times)         10/18/22 1102     IP VTE LOW RISK PATIENT  Once         10/16/22 2046     Place sequential compression device  Until discontinued         10/16/22 2046                      Department Hospital Medicine  [facility]  Joey Ramsey MD  Date of service: 10/19/2022

## 2022-10-20 NOTE — PT/OT/SLP PROGRESS
Physical Therapy      Patient Name:  Blaire Cage   MRN:  [id number]    Patient not seen today secondary to Nurse/ DOMINGA hold, Other (Comment) (Pt just started resp tx at first attempt, then RN hold at second attempt(RN gave pt enema and she did not respond well).). Will follow-up 10/21/22.

## 2022-10-20 NOTE — CARE UPDATE
10/19/22 2686   Patient Assessment/Suction   Respiratory Effort Unlabored   ESPERANZA Breath Sounds clear   LLL Breath Sounds diminished   RUL Breath Sounds clear   RML Breath Sounds diminished   RLL Breath Sounds diminished   Rhythm/Pattern, Respiratory pattern regular   Skin Integrity   $ Wound Care Tech Time 15 min   Area Observed Bridge of nose   Skin Appearance without discoloration   PRE-TX-O2   O2 Device (Oxygen Therapy) BiPAP   Oxygen Concentration (%) 28   SpO2 98 %   Pulse Oximetry Type Continuous   Pulse 66   Resp 20   Aerosol Therapy   $ Aerosol Therapy Charges PRN treatment not required   Ready to Wean/Extubation Screen   FIO2<=50 (chart decimal) 0.28   Preset CPAP/BiPAP Settings   Mode Of Delivery BiPAP   $ Is patient using? Yes   Size of Mask Small   Sized Appropriately? Yes   Equipment Type V60   Airway Device Type small full face mask   Ipap 10   EPAP (cm H2O) 5   Pressure Support (cm H2O) 5   Set Rate (Breaths/Min) 16   ITime (sec) 0.9   Rise Time (sec) 3   Patient CPAP/BiPAP Settings   RR Total (Breaths/Min) 19   Tidal Volume (mL) 441   VE Minute Ventilation (L/min) 8.2 L/min   Peak Inspiratory Pressure (cm H2O) 10   TiTOT (%) 30   Total Leak (L/Min) 10   Patient Trigger - ST Mode Only (%) 95   CPAP/BiPAP Backup Settings   IPAP Backup 10 cmH2O   EPAP Backup 5 cmH2O   Backup Rate 16 breaths per minute (bpm)   FIO2 Backup 0.28 %   ITIME Backup 1 seconds   Rise Time Backup 3 seconds   CPAP/BiPAP Alarms   High Pressure (cm H2O) 40   Low Pressure (cm H2O) 10   Minute Ventilation (L/Min) 3   High RR (breaths/min) 40   Low RR (breaths/min) 7   Apnea (Sec) 20   Education   $ Education 15 min;BiPAP   Respiratory Evaluation   $ Care Plan Tech Time 15 min   $ Eval/Re-eval Charges Re-evaluation   Evaluation For   (CARE PLAN)

## 2022-10-20 NOTE — PLAN OF CARE
Ongoing  progressing/0740 pt  up in chair  1/2 liter nc sat 96 % had not  had bm since  Friday notified  rx for enema purwix  present and working  linen on bed all changed  remaining  assessment per flow sheet  pt  completed  glucerna / 1030 lactulose  enema given per bedside comode  at 1100 no stool retreived  only enema  liquid  pt  stated feeling weak  assited  by 2 nurses  back to bed  wnl of pt

## 2022-10-20 NOTE — PROGRESS NOTES
[facility]  Department of Cardiology  Consult Note      PATIENT NAME: Blaire Cage  MRN: [id number]  TODAY'S DATE: 10/20/2022  ADMIT DATE: 10/16/2022                          CONSULT REQUESTED BY: Joey Ramsey MD    SUBJECTIVE     PRINCIPAL PROBLEM: Pneumonia      10/20/22    Patient lying in bed in no acute distress. Feels better. NO SOB. On room air doing well. Denies CP.         REASON FOR CONSULT:  Abnormal ECHO      HPI:    89 year old female patient known to myself and SARA De La Fuente. She has been having SOB, fatigue fever and cough and came to ER for evaluation was diagnosed with pneumonia  ECHO shows apical hypokinesis and we have been consulted for the same. She denies CP or Palpitations.         Patient information was obtained from patient, past medical records and ER records.   HISTORY OF PRESENT ILLNESS:   Blaire Cage is a 89 y.o. old female who  has a past medical history of Anemia due to multiple mechanisms (6/29/2018), Anemia, chronic disease (6/29/2018), Anemia, chronic renal failure, stage 3 (moderate) (6/29/2018), Anticoagulant long-term use, Aortic aneurysm, Chest pain, CHF (congestive heart failure), COPD (chronic obstructive pulmonary disease), COPD with acute exacerbation (1/9/2015), Depression, Diabetes mellitus type II, DVT (deep venous thrombosis) (06/09/2018), Encounter for blood transfusion, GERD (gastroesophageal reflux disease), GI bleed, Gout, 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 (8/7/2018), Hip arthritis (3/1/2016), Homocysteinemia (8/7/2018), Hyperlipidemia, Hypertension, Incontinent of urine, Normocytic normochromic anemia (6/29/2018), Oxygen dependent, PE (pulmonary thromboembolism) (06/09/2018), Pneumonia of right lower lobe due to infectious organism (9/11/2017), Syncope, Thyroid disease, Type 2 diabetes mellitus with stage 3 chronic kidney disease (1/18/2013), and UTI (urinary tract infection).. The patient presented to [facility] on 10/16/2022  with a primary complaint of Shortness of Breath and Weakness (Pt reports to ED with weakness/SOB starting last night. Pt has a hx of COPD(as needed/at night). Pt in nad at triage, vss, in wheelchair.)  .      Very pleasant 89-year-old female presents to the emergency room with shortness of breath.  The patient states she has been having shortness of breath and weakness for the past 2 days.  Patient states last night shortness of breath got progressively worse.  It she also endorses generalized weakness a cough and fatigue.  She describes her cough as productive in nature and yellow in color.  She denies fever chills hematemesis hemoptysis lightheadedness dizziness or syncope.       The patient does use oxygen at home at night she is on CPAP.  She has been using her oxygen more secondary to dyspnea with exertion.  She denied actual chest pain lightheadedness dizziness palpitations or syncope     She describes her symptoms as moderate to severe severity with no exacerbating or alleviating factors        Past medical history is significant for chronic kidney disease stage 3, hypertension, aortic aneurysm, CHF, COPD, type 2 diabetes, depression, arthritis, hyperlipidemia, normocytic anemia, oxygen dependent, pulmonary in emboli on 06/09/2018, thyroid disease.     In the emergency room she was found to have pneumonia and was started with a broad-spectrum antibiotic and DuoNeb treatments.  She was also found to have a component of heart failure and given IV Lasix.  We will monitor her output and intake closely and consult her cardiologist for further recommendations  REVIEW OF SYSTEMS:   10 Point Review of System was performed and was found to be negative except for that mentioned already in the HPI and   Review of Systems (Negative unless checked off)  Review of Systems   Constitutional:  Positive for chills and malaise/fatigue.   HENT: Negative.     Eyes: Negative.    Respiratory:  Positive for cough, sputum production  and shortness of breath.    Cardiovascular:  Positive for leg swelling.   Gastrointestinal: Negative.    Genitourinary: Negative.    Musculoskeletal: Negative.    Skin: Negative.    Neurological:  Positive for weakness.   Endo/Heme/Allergies: Negative.    Psychiatric/Behavioral: Negative.           Review of patient's allergies indicates:   Allergen Reactions    Carvedilol Other (See Comments)     Bradycardia and syncope    Boniva [ibandronate]     Codeine     Hydralazine analogues     Iodinated contrast media Hives    Kionex [sodium polystyrene sulfonate] Diarrhea     From encounter 12/11/17 for diarrhea--not true allergy.    Lisinopril     Morphine        Past Medical History:   Diagnosis Date    Anemia due to multiple mechanisms 6/29/2018    Anemia, chronic disease 6/29/2018    Anemia, chronic renal failure, stage 3 (moderate) 6/29/2018    Anticoagulant long-term use     aspirin    Aortic aneurysm     Chest pain     CHF (congestive heart failure)     COPD (chronic obstructive pulmonary disease)     COPD with acute exacerbation 1/9/2015    Depression     Diabetes mellitus type II     no longer on any DM meds    DVT (deep venous thrombosis) 06/09/2018    Encounter for blood transfusion     GERD (gastroesophageal reflux disease)     GI bleed     Gout     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 8/7/2018    Hip arthritis 3/1/2016    Homocysteinemia 8/7/2018    Hyperlipidemia     Hypertension     Incontinent of urine     Normocytic normochromic anemia 6/29/2018    Oxygen dependent     use oxygen as needed    PE (pulmonary thromboembolism) 06/09/2018    Pneumonia of right lower lobe due to infectious organism 9/11/2017    Syncope     Thyroid disease     hypothyroid    Type 2 diabetes mellitus with stage 3 chronic kidney disease 1/18/2013    UTI (urinary tract infection)      Past Surgical History:   Procedure Laterality Date    APPENDECTOMY      CHOLECYSTECTOMY      COLONOSCOPY Left 10/29/2020    Procedure: COLONOSCOPY;   Surgeon: Singh Kee III, MD;  Location[facility];  Service: Endoscopy;  Laterality: Left;    ESOPHAGOGASTRODUODENOSCOPY Left 10/26/2020    Procedure: EGD (ESOPHAGOGASTRODUODENOSCOPY);  Surgeon: Kareem Gramajo MD;  Location: [facility] ENDO;  Service: Endoscopy;  Laterality: Left;    ESOPHAGOGASTRODUODENOSCOPY Left 10/28/2020    Procedure: EGD (ESOPHAGOGASTRODUODENOSCOPY);  Surgeon: Kareem Gramajo MD;  Location: [facility];  Service: Endoscopy;  Laterality: Left;    ESOPHAGOGASTRODUODENOSCOPY N/A 9/16/2021    Procedure: EGD (ESOPHAGOGASTRODUODENOSCOPY);  Surgeon: Kareem Gramajo MD;  Location: [facility];  Service: Endoscopy;  Laterality: N/A;    FRACTURE SURGERY      right hip     HERNIA REPAIR      groin    HYSTERECTOMY      INSERTION OF IMPLANTABLE LOOP RECORDER N/A 12/12/2018    Procedure: Insertion, Implantable Loop Recorder;  Surgeon: Ashok Herbert MD;  Location: [facility] CATH LAB;  Service: Cardiology;  Laterality: N/A;    PERCUTANEOUS PINNING OF HIP Left 3/23/2019    Procedure: PINNING, HIP, PERCUTANEOUS;  Surgeon: Jorje Hamlin MD;  Location: [facility] OR;  Service: Orthopedics;  Laterality: Left;    SMALL BOWEL ENTEROSCOPY N/A 10/29/2020    Procedure: ENTEROSCOPY;  Surgeon: Singh Kee III, MD;  Location: [facility];  Service: Endoscopy;  Laterality: N/A;    VARICOSE VEIN SURGERY       Social History     Tobacco Use    Smoking status: Former     Packs/day: 0.35     Years: 44.00     Pack years: 15.40     Types: Cigarettes     Start date: 1970    Smokeless tobacco: Never    Tobacco comments:     1/08/2015   Substance Use Topics    Alcohol use: No     Alcohol/week: 0.0 standard drinks    Drug use: No        REVIEW OF SYSTEMS  +cough  +wheezing  +sob -improving      OBJECTIVE     VITAL SIGNS (Most Recent)  Temp: 97.6 °F (36.4 °C) (10/20/22 0809)  Pulse: 70 (10/20/22 0929)  Resp: 18 (10/20/22 0929)  BP: (!) 141/65 (10/20/22 0809)  SpO2: 100 % (10/20/22 0929)    VENTILATION STATUS  Resp: 18 (10/20/22  0929)  SpO2: 100 % (10/20/22 0929)  Oxygen Concentration (%):  [28] 28    I & O (Last 24H):  Intake/Output Summary (Last 24 hours) at 10/20/2022 1024  Last data filed at 10/20/2022 0701  Gross per 24 hour   Intake 990 ml   Output 600 ml   Net 390 ml       WEIGHTS  Wt Readings from Last 3 Encounters:   10/16/22 2233 56.5 kg (124 lb 9 oz)   10/16/22 1621 57.6 kg (127 lb)   10/18/22 1200 56.2 kg (124 lb)   10/12/22 1606 57 kg (125 lb 9.6 oz)       PHYSICAL EXAM  GENERAL: well built, well nourished, well-developed in no apparent distress alert and oriented.   HEENT: Normocephalic. Pupils normal and conjunctivae normal.  Mucous membranes normal, no cyanosis or icterus, trachea central,no pallor or icterus is noted..   NECK: No JVD. No bruit..   THYROID: Thyroid not enlarged. No nodules present..   CARDIAC: systolic murmur noted  CHEST ANATOMY: normal.   LUNGS: wheezing  ABDOMEN: Soft no masses or organomegaly.  No abdomen pulsations or bruits.  Normal bowel sounds. No pulsations and no masses felt, No guarding or rebound.   URINARY: No carter catheter   EXTREMITIES: No cyanosis, clubbing or edema noted at this time., no calf tenderness bilaterally.   PERIPHERAL VASCULAR SYSTEM: Good palpable distal pulses.   CENTRAL NERVOUS SYSTEM: No focal motor or sensory deficits noted.   SKIN: Skin without lesions, moist, well perfused.   MUSCLE STRENGTH & TONE: No noteable weakness, atrophy or abnormal movement.     HOME MEDICATIONS:  No current facility-administered medications on file prior to encounter.     Current Outpatient Medications on File Prior to Encounter   Medication Sig Dispense Refill    albuterol (PROVENTIL) 2.5 mg /3 mL (0.083 %) nebulizer solution Take 3 mLs (2.5 mg total) by nebulization every 6 (six) hours as needed for Wheezing or Shortness of Breath. USE 1 VIAL IN NEBULIZER EVERY 6 HOURS AS NEEDED FOR WHEEZING. RESCUE 360 each 3    allopurinoL (ZYLOPRIM) 100 MG tablet Take 1 tablet (100 mg total) by mouth once  daily. 90 tablet 3    ascorbic acid, vitamin C, (VITAMIN C) 500 MG tablet Take 500 mg by mouth once daily.      calcium carbonate (OS-TASIA) 500 mg calcium (1,250 mg) tablet Take 1 tablet by mouth 2 (two) times daily.      diltiaZEM (CARDIZEM CD) 180 MG 24 hr capsule Take 1 capsule (180 mg total) by mouth once daily. 90 capsule 3    fish oil-omega-3 fatty acids 300-1,000 mg capsule Take 2 g by mouth every evening.      fluticasone-umeclidin-vilanter (TRELEGY ELLIPTA) 200-62.5-25 mcg inhaler Inhale 1 puff into the lungs once daily. 60 each 11    folic acid-vit B6-vit B12 2.5-25-2 mg (FOLBIC) 2.5-25-2 mg Tab Take 1 tablet by mouth once daily.      furosemide (LASIX) 20 MG tablet Take 1 tablet only as needed, for swelling, increase SOB, weight gain of 3 lbs overnight or 5 lbs for the week 30 tablet prn    levothyroxine (SYNTHROID) 88 MCG tablet Take 1 tablet (88 mcg total) by mouth once daily. 90 tablet 3    losartan (COZAAR) 25 MG tablet Take 1 tablet (25 mg total) by mouth once daily. (Patient taking differently: Take 25 mg by mouth once daily. prn) 90 tablet 3    mepolizumab 100 mg/mL AtIn Inject 1 mL (100 mg total) into the skin every 28 days. 1 mL 12    montelukast (SINGULAIR) 10 mg tablet TAKE 1 TABLET BY MOUTH ONCE DAILY IN THE EVENING (Patient taking differently: Take 10 mg by mouth every evening.) 90 tablet 3    multivitamin (THERAGRAN) tablet Take 1 tablet by mouth once daily.      omeprazole (PRILOSEC) 40 MG capsule Take 1 capsule (40 mg total) by mouth once daily. 90 capsule 3    rosuvastatin (CRESTOR) 20 MG tablet Take 1 tablet (20 mg total) by mouth once daily. 90 tablet 3    sertraline (ZOLOFT) 50 MG tablet Take 1 tablet (50 mg total) by mouth once daily. 90 tablet 1    simethicone 180 mg Cap Take 1 capsule by mouth 3 (three) times daily as needed.      acetaminophen (TYLENOL) 325 MG tablet Take 325 mg by mouth every 6 (six) hours as needed for Pain.      ciclopirox (PENLAC) 8 % Soln Apply topically  nightly. 6.6 mL 3    ferrous sulfate (FEOSOL) 325 mg (65 mg iron) Tab tablet Take 325 mg by mouth once daily.      polyethylene glycol (GLYCOLAX) 17 gram/dose powder Take 17 g by mouth 2 (two) times daily. 850 g 3    [DISCONTINUED] clobetasol 0.05% (TEMOVATE) 0.05 % Oint Apply topically 2 (two) times daily. 45 g 1    [DISCONTINUED] diclofenac sodium (VOLTAREN) 1 % Gel Apply 2 g topically once daily. (Patient taking differently: Apply 2 g topically as needed.) 100 g prn    [DISCONTINUED] ketoconazole (NIZORAL) 2 % shampoo Apply topically twice a week. (Patient taking differently: Apply 1 application topically twice a week.) 120 mL 2    [DISCONTINUED] meclizine (ANTIVERT) 25 mg tablet Take 1 tablet (25 mg total) by mouth 3 (three) times daily as needed. 20 tablet prn    [DISCONTINUED] nitroGLYCERIN (NITROSTAT) 0.4 MG SL tablet Place 1 tablet (0.4 mg total) under the tongue every 5 (five) minutes as needed for Chest pain. As needed (Patient taking differently: Place 0.4 mg under the tongue every 5 (five) minutes as needed for Chest pain. Seek medical help if pain is not relieved after the third dose.) 30 tablet 3    [DISCONTINUED] nystatin (MYCOSTATIN) powder Apply topically 4 (four) times daily. 60 g 1       SCHEDULED MEDS:   ascorbic acid (vitamin C)  500 mg Oral Daily    atorvastatin  40 mg Oral Daily    diltiaZEM  180 mg Oral Daily    enoxparin  1 mg/kg Subcutaneous Q24H    furosemide (LASIX) injection  20 mg Intravenous Daily    isosorbide mononitrate  30 mg Oral Daily    lactulose (CHRONULAC) 200 g with sodium chloride 0.9% ENEMA   Rectal TID    levoFLOXacin  500 mg Intravenous Q48H    levothyroxine  88 mcg Oral Before breakfast    losartan  25 mg Oral Daily    montelukast  10 mg Oral QHS    NON FORMULARY MEDICATION 1 puff  1 puff Inhalation Daily    sertraline  50 mg Oral Daily       CONTINUOUS INFUSIONS:    PRN MEDS:albuterol-ipratropium, dextrose 10%, dextrose 10%, glucagon (human recombinant), insulin aspart  U-100, LORazepam, melatonin, ondansetron, polyethylene glycol, sodium chloride 0.9%, sodium chloride 0.9%    LABS AND DIAGNOSTICS     CBC LAST 3 DAYS  Recent Labs   Lab 10/18/22  0417 10/19/22  0405 10/20/22  0524   WBC 11.05 8.02 7.78   RBC 2.72* 2.44* 2.64*   HGB 8.7* 7.8* 8.4*   HCT 27.4* 24.8* 26.9*   * 102* 102*   MCH 32.0* 32.0* 31.8*   MCHC 31.8* 31.5* 31.2*   RDW 13.6 13.9 14.1    193 176   MPV 11.4 11.4 11.6   GRAN 75.4*  8.3* 71.3  5.7 72.7  5.7   LYMPH 13.4*  1.5 16.8*  1.4 14.4*  1.1   MONO 10.3  1.1* 10.7  0.9 11.2  0.9   BASO 0.02 0.02 0.03   NRBC 0 0 0       COAGULATION LAST 3 DAYS  No results for input(s): LABPT, INR, APTT in the last 168 hours.    CHEMISTRY LAST 3 DAYS  Recent Labs   Lab 10/17/22  0506 10/18/22  1108 10/19/22  0405 10/20/22  0524    142 142 138   K 4.4 4.0 4.0 4.2    108 107 106   CO2 20* 23 27 25   ANIONGAP 13 11 8 7*   BUN 41* 49* 55* 62*   CREATININE 1.7* 1.9* 2.0* 2.1*   * 132* 106 127*   CALCIUM 8.5* 8.6* 8.3* 8.0*   MG 1.8  --   --   --    ALBUMIN 2.8* 2.8* 2.6* 2.6*   PROT 5.8* 5.7* 5.3* 5.3*   ALKPHOS 70 65 60 66   ALT 21 22 20 20   AST 29 30 27 26   BILITOT 0.5 0.5 0.6 0.6       CARDIAC PROFILE LAST 3 DAYS  Recent Labs   Lab 10/16/22  1734 10/16/22  2121 10/19/22  1745   BNP 2,653*  --  3,449*   TROPONINI 0.404* 0.389* 1.370*       ENDOCRINE LAST 3 DAYS  No results for input(s): TSH, PROCAL in the last 168 hours.    LAST ARTERIAL BLOOD GAS  ABG  No results for input(s): PH, PO2, PCO2, HCO3, BE in the last 168 hours.    LAST 7 DAYS MICROBIOLOGY   Microbiology Results (last 7 days)       Procedure Component Value Units Date/Time    Blood culture x two cultures. Draw prior to antibiotics. [[id number]] Collected: 10/16/22 1840    Order Status: Completed Specimen: Blood from Peripheral, Hand, Right Updated: 10/19/22 2032     Blood Culture, Routine No Growth to date      No Growth to date      No Growth to date      No Growth to date     Narrative:      Aerobic and anaerobic    Blood culture x two cultures. Draw prior to antibiotics. [[id number]] Collected: 10/16/22 1945    Order Status: Completed Specimen: Blood from Peripheral, Antecubital, Right Updated: 10/19/22 2032     Blood Culture, Routine No Growth to date      No Growth to date      No Growth to date      No Growth to date    Narrative:      Aerobic and anaerobic            MOST RECENT IMAGING  X-Ray Chest PA And Lateral  Reason: chf Shortness of Breath; Weakness    FINDINGS:  PA and lateral chest is compared to 10/19/2022 show cardiomegaly. There is a heart monitor device.    There are tiny pleural effusions with bibasilar airspace disease suggestive of atelectasis versus infiltrates. The upper lobes are clear. Pulmonary vasculature is normal. Bones are normal.    IMPRESSION:  Mild cardiomegaly with tiny pleural effusions and bibasilar atelectasis or infiltrates    Electronically signed by:  Jonna Lyn MD  10/20/2022 7:28 AM CDT Workstation: [id number]      ECHOCARDIOGRAM RESULTS (last 5)  Results for orders placed during the hospital encounter of 10/16/22    Echo    Interpretation Summary  · Moderate concentric hypertrophy and mildly decreased systolic function.  · The estimated ejection fraction is 45%.  · There are segmental left ventricular wall motion abnormalities. Anterior apical akinesia  · Moderate to severe tricuspid regurgitation.  · Moderate-to-severe mitral regurgitation.  · There is moderate to severe pulmonary hypertension.  · Severe left atrial enlargement.  · Grade II left ventricular diastolic dysfunction.  · Atrial fibrillation not observed.  · There is right ventricular hypertrophy.  · There is mild mitral stenosis. Mitral valve not well seen because of heavy mitral annular calcification      Results for orders placed during the hospital encounter of 03/21/22    Echo    Interpretation Summary  · The estimated PA systolic pressure is 43 mmHg.  · The left ventricle  is normal in size with mild concentric hypertrophy and normal systolic function.  · The estimated ejection fraction is 65%.  · Grade II left ventricular diastolic dysfunction.  · Normal right ventricular size with normal right ventricular systolic function.  · Moderate mitral regurgitation.  · There is moderate mitral stenosis.  · There is moderate mitral leaflet calcification.  · Mild tricuspid regurgitation.  · There is mild pulmonary hypertension.  · There is mild aortic valve stenosis.  · Aortic valve area is 1.77 cm2; peak velocity is 2.08 m/s; mean gradient is 11 mmHg.  · Moderate left atrial enlargement.  · Mild right atrial enlargement.      Results for orders placed during the hospital encounter of 10/19/21    Echo    Interpretation Summary  · The estimated PA systolic pressure is 44 mmHg.  · The left ventricle is normal in size with severe concentric hypertrophy and normal systolic function.  · The estimated ejection fraction is 60%.  · Grade II left ventricular diastolic dysfunction.  · Mild right ventricular enlargement.  · Mild left atrial enlargement.  · Mild to moderate tricuspid regurgitation.  · Mild-to-moderate mitral regurgitation.  · Normal central venous pressure (3 mmHg).      Results for orders placed during the hospital encounter of 05/07/20    Echo Color Flow Doppler? Yes; Bubble Contrast? Yes    Interpretation Summary  · Concentric left ventricular hypertrophy.  · The mean diastolic gradient across the mitral valve is 5 mmHg at a heart rate of 57 bpm.  · Normal left ventricular systolic function. The estimated ejection fraction is 65%.  · Grade II (moderate) left ventricular diastolic dysfunction consistent with pseudonormalization.  · Normal right ventricular systolic function.  · Severe left atrial enlargement.  · Mild right atrial enlargement.  · Mild mitral regurgitation.  · Mild to moderate tricuspid regurgitation.  · Normal central venous pressure (3 mmHg).  · The estimated PA  systolic pressure is 50 mmHg.  · Pulmonary hypertension present.  · No PFO on color flow or saline bubble study      CURRENT/PREVIOUS VISIT EKG  Results for orders placed or performed during the hospital encounter of 10/16/22   EKG 12-LEAD    Collection Time: 10/16/22  6:27 PM    Narrative    Test Reason : R07.9,    Vent. Rate : 069 BPM     Atrial Rate : 069 BPM     P-R Int : 184 ms          QRS Dur : 090 ms      QT Int : 430 ms       P-R-T Axes : 052 038 -48 degrees     QTc Int : 460 ms    Normal sinus rhythm  Anteroseptal infarct (cited on or before 20-JUL-2022)  Abnormal ECG  When compared with ECG of 20-JUL-2022 17:02,  Questionable change in initial forces of Anterior leads  Non-specific change in ST segment in Anterior leads  Nonspecific T wave abnormality now evident in Inferior leads  T wave inversion now evident in Anterior leads  Confirmed by Gualberto De La Fuente MD ([id number]) on 10/19/2022 6:05:19 PM    Referred By: [email or web address]   SELF           Confirmed By:Gualberto De La Fuente MD           ASSESSMENT/PLAN:     Active Hospital Problems    Diagnosis    *Pneumonia    Acute on chronic combined systolic and diastolic heart failure       ASSESSMENT & PLAN:     Acute on Chronic CHFrEF- Improved  Pneumonia  Moderate TR  Moderate MR  Apical Hypokinesis  PH  Anemia- hg 7.8 -8.4 TODAY  SHALINI on CKD    RECOMMENDATIONS:    Add ASA 81 mg daily  Continue Imdur 30 mg daily  When lungs clear and heal up consider changing to beta blocker from calcium channel blocker  No CP currently  NO fluid overloaded currently on exam  Doing well  Will sign off  She will need OP Follow up with Dr. SARA Perez, GABRIELA  [facility]  Department of Cardiology  Date of Service: 10/20/2022          Pateint seen and examined agree with above  Shelton Desouza M.D.  [facility]  Department of Cardiology  Date of Service: 10/20/2022  8:45 AM

## 2022-10-21 NOTE — PLAN OF CARE
[facility]  Discharge Reassessment    Primary Care Provider: Eli Edmonds MD    Expected Discharge Date: 10/21/2022     met with Pt at bedside to discuss discharge plans. Pt verbalized she plans to discharge home and resume services with her previous home health company.  inquired about the name of her home health company. Pt could not recall and verbalized possibility her medical record may have the name.  reviewed chart and observed [facility] was most recent home health company and [place] [facility] [place].  faxed home health packet to [employer] for authorization.  to follow-up.    Reassessment (most recent)       Discharge Reassessment - 10/21/22 1541          Discharge Reassessment    Assessment Type Discharge Planning Reassessment     Did the patient's condition or plan change since previous assessment? Yes     Discharge Plan discussed with: Patient     Communicated ELYSSA with patient/caregiver Yes     Discharge Plan A Home Health     Discharge Plan B Home Health     DME Needed Upon Discharge  none     Discharge Barriers Identified None        Post-Acute Status    Post-Acute Authorization Home Health     Home Health Status Referrals Sent     Coverage Payor:  [employer] [facility]     Discharge Delays None known at this time

## 2022-10-21 NOTE — PLAN OF CARE
Problem: Adult Inpatient Plan of Care  Goal: Plan of Care Review  Outcome: Ongoing, Progressing  Goal: Patient-Specific Goal (Individualized)  Outcome: Ongoing, Progressing  Goal: Absence of Hospital-Acquired Illness or Injury  Outcome: Ongoing, Progressing  Goal: Optimal Comfort and Wellbeing  Outcome: Ongoing, Progressing  Goal: Readiness for Transition of Care  Outcome: Ongoing, Progressing     Problem: Diabetes Comorbidity  Goal: Blood Glucose Level Within Targeted Range  Outcome: Ongoing, Progressing     Problem: Fluid and Electrolyte Imbalance (Acute Kidney Injury/Impairment)  Goal: Fluid and Electrolyte Balance  Outcome: Ongoing, Progressing     Problem: Oral Intake Inadequate (Acute Kidney Injury/Impairment)  Goal: Optimal Nutrition Intake  Outcome: Ongoing, Progressing     Problem: Renal Function Impairment (Acute Kidney Injury/Impairment)  Goal: Effective Renal Function  Outcome: Ongoing, Progressing     Problem: Fluid Imbalance (Pneumonia)  Goal: Fluid Balance  Outcome: Ongoing, Progressing     Problem: Infection (Pneumonia)  Goal: Resolution of Infection Signs and Symptoms  Outcome: Ongoing, Progressing     Problem: Respiratory Compromise (Pneumonia)  Goal: Effective Oxygenation and Ventilation  Outcome: Ongoing, Progressing     Problem: Fall Injury Risk  Goal: Absence of Fall and Fall-Related Injury  Outcome: Ongoing, Progressing     Problem: Skin Injury Risk Increased  Goal: Skin Health and Integrity  Outcome: Ongoing, Progressing

## 2022-10-21 NOTE — PT/OT/SLP PROGRESS
Physical Therapy Treatment    Patient Name:  Blaire Cage   MRN:  [id number]    Recommendations:     Discharge Recommendations:  home health PT   Discharge Equipment Recommendations: none   Barriers to discharge: None    Assessment:     Blaire Cage is a 89 y.o. female admitted with a medical diagnosis of Pneumonia.  She presents with the following impairments/functional limitations:  weakness, impaired endurance, impaired self care skills, impaired functional mobility, gait instability, impaired balance, decreased safety awareness, impaired cardiopulmonary response to activity.    Pt found HOB elevated and agreeable to working with PT. During transfer oob PT noted pt and her linens were soaked due to brief and Purewick not in place properly, so PT assisted pt oob to chair for there ex and to get cleaned up. PT changed linens while pt performing there ex then cleaned pt up, changed brief, gown and Purewick and assisted pt back to bed. Pt required moderate to minimal A for transfers.    Rehab Prognosis: Fair; patient would benefit from acute skilled PT services to address these deficits and reach maximum level of function.    Recent Surgery: * No surgery found *      Plan:     During this hospitalization, patient to be seen 5 x/week to address the identified rehab impairments via gait training, therapeutic activities, therapeutic exercises and progress toward the following goals:    Plan of Care Expires:  11/23/22    Subjective     Chief Complaint: Pt reported not feeling great and feeling cold.   Patient/Family Comments/goals: Home with daughter to assist and HH PT  Pain/Comfort:  Pain Rating 1: 0/10  Pain Rating Post-Intervention 1: 0/10      Objective:     Communicated with RN prior to session.  Patient found HOB elevated with bed alarm, oxygen, peripheral IV, telemetry, PureWick upon PT entry to room.     General Precautions: Standard, fall, diabetic, respiratory   Orthopedic Precautions:N/A   Braces:     Respiratory Status: Nasal cannula, flow 2 L/min     Functional Mobility:  Bed Mobility:     Scooting: minimum assistance  Supine to Sit: moderate assistance  Sit to Supine: minimum assistance and moderate assistance  Transfers:     Sit to Stand:  minimum assistance and moderate assistance with no AD and rolling walker  Bed to Chair: minimum assistance and moderate assistance with  no AD and rolling walker  using  Step Transfer      AM-PAC 6 CLICK MOBILITY          Treatment & Education:  Pt performed B LE there ex sitting x 15 reps with vc for proper execution and joint protection: ap, laq, marching, hip abd/add.    Pt assisted pt to stand from chair and get cleaned prior to transfer back to bed as noted above.     Pt was educated on the following: call light use, importance of OOB activity and functional mobility to negate the negative effects of prolonged bed rest during this hospitalization, safe transfers/ambulation and discharge planning recommendations/options.        Patient left HOB elevated with all lines intact, call button in reach, bed alarm on, and RN notified..    GOALS:   Multidisciplinary Problems       Physical Therapy Goals          Problem: Physical Therapy    Goal Priority Disciplines Outcome Goal Variances Interventions   Physical Therapy Goal     PT, PT/OT      Description: Goals to be met by: 22     Patient will increase functional independence with mobility by performin. Supine to sit with Supervision  2. Sit to stand transfer with Supervision  3. Bed to chair transfer with Supervision using Rolling Walker  4. Gait  x 50 feet with Supervision using Rolling Walker.                              Time Tracking:     PT Received On: 10/21/22  PT Start Time: 913     PT Stop Time: 1000  PT Total Time (min): 47 min     Billable Minutes: Therapeutic Activity 35 and Therapeutic Exercise 12    Treatment Type: Treatment  PT/PTA: PT           10/21/2022

## 2022-10-21 NOTE — PROGRESS NOTES
[facility] Medicine  Progress Note    Patient name: Blaire Cage  MRN: [id number]  Admit Date: 10/16/2022   LOS: 2 days     SUBJECTIVE:     Principal problem: Pneumonia    Interval History:  She appears to be dry. CXR improved.  Small bump in Cr and BUN and thus stopping IV lasix.  She has not had a BM for several days. She received an enema without producing a BM and had what sounds like a near syncope event after enema. KUB ordered and reveals possible ileus of SBO. She tells me she is passing gas.  Her abd feels full on exam but not distended and is not painful.  No N/V.    Hospital Course:     I, Dr. Ramsey, have assumed care 10/18. Patient admitted with suspected pneumonia and started on IV abx. Patient transferred to step down unit overnight after developing respiratory distress with reports of difficulty weaning her from bipap.  Being treated for pneumonia with IV abx. Repeat CXR with unchanged appearing RLL opacity.  Her BNP is elevated at 2653 on admission which is significantly higher than her previous (278 on 3/22).  She was started on IV lasix given concern for HF. She has only put out 900 at the time of this note writing. Troponin mildly elevated but static.  Echo ordered and EF 45% with segmental LV wall motion abnormalities and anterior apical akinesia which appears much different from her 3/2022 echo. Echo also reveals mod to severe TR, mod to severe MR and mod to severe pulm HTN which appears worse from prior. Cardiology consulted and has evaluated the patient. Imdur added and recommend changing oral diltiazem to a beta blocker when lungs are improved. Bipap able to be weaned.  Supplemental oxygen able to be weaned down.     Scheduled Meds:   ascorbic acid (vitamin C)  500 mg Oral Daily    aspirin  81 mg Oral Daily    atorvastatin  40 mg Oral Daily    diltiaZEM  180 mg Oral Daily    docusate sodium  100 mg Oral Daily    enoxparin  1 mg/kg Subcutaneous Q24H    isosorbide  mononitrate  30 mg Oral Daily    levoFLOXacin  500 mg Intravenous Q48H    levothyroxine  88 mcg Oral Before breakfast    losartan  25 mg Oral Daily    montelukast  10 mg Oral QHS    NON FORMULARY MEDICATION 1 puff  1 puff Inhalation Daily    polyethylene glycol  17 g Oral Daily    sertraline  50 mg Oral Daily     Continuous Infusions:  PRN Meds:albuterol-ipratropium, dextrose 10%, dextrose 10%, glucagon (human recombinant), insulin aspart U-100, LORazepam, melatonin, ondansetron, polyethylene glycol, sodium chloride 0.9%, sodium chloride 0.9%    Review of patient's allergies indicates:   Allergen Reactions    Carvedilol Other (See Comments)     Bradycardia and syncope    Boniva [ibandronate]     Codeine     Hydralazine analogues     Iodinated contrast media Hives    Kionex [sodium polystyrene sulfonate] Diarrhea     From encounter 12/11/17 for diarrhea--not true allergy.    Lisinopril     Morphine        Review of Systems: As per interval history    OBJECTIVE:     Vital Signs (Most Recent)  Temp: 98.6 °F (37 °C) (10/20/22 1701)  Pulse: 70 (10/20/22 1301)  Resp: (!) 25 (10/20/22 1301)  BP: (!) 103/57 (10/20/22 1301)  SpO2: 100 % (10/20/22 1301)    Vital Signs Range (Last 24H):  Temp:  [97.6 °F (36.4 °C)-98.6 °F (37 °C)]   Pulse:  [66-73]   Resp:  [15-31]   BP: (103-160)/(47-72)   SpO2:  [92 %-100 %]     I & O (Last 24H):  Intake/Output Summary (Last 24 hours) at 10/20/2022 1928  Last data filed at 10/20/2022 1835  Gross per 24 hour   Intake 580 ml   Output 750 ml   Net -170 ml         Physical Exam:    Vitals and nursing note reviewed.     Constitutional:       General: Not in acute distress.     Appearance: Well-developed.   HENT:      Head: Normocephalic and atraumatic.   Eyes:      Pupils: Pupils are equal, round, and reactive to light.   Cardiovascular:      Rate and Rhythm: Regular rhythm.   No LE edema  Pulmonary:      Effort: Pulmonary effort is normal.      Breath sounds: Normal breath sounds. No wheezing.    O2 per NC  Abdominal:      General: There is no distension.      Palpations: Abdomen is full     Tenderness: There is no abdominal tenderness. There is no guarding or rebound.   Musculoskeletal:         General: Normal range of motion.      Cervical back: Normal range of motion.   Skin:     Findings: No rash.   Neurological:      Mental Status: Alert and oriented to person, place, and time.      Cranial Nerves: No cranial nerve deficit.      Sensory: No sensory deficit.     Laboratory:  I have reviewed all pertinent lab results within the past 24 hours.  CBC:   Recent Labs   Lab 10/20/22  0524   WBC 7.78   RBC 2.64*   HGB 8.4*   HCT 26.9*      *   MCH 31.8*   MCHC 31.2*       CMP:   Recent Labs   Lab 10/20/22  0524   *   CALCIUM 8.0*   ALBUMIN 2.6*   PROT 5.3*      K 4.2   CO2 25      BUN 62*   CREATININE 2.1*   ALKPHOS 66   ALT 20   AST 26   BILITOT 0.6       Cardiac markers:   Recent Labs   Lab 10/19/22  1745   TROPONINI 1.370*       Microbiology Results (last 7 days)       Procedure Component Value Units Date/Time    Blood culture x two cultures. Draw prior to antibiotics. [[id number]] Collected: 10/16/22 1840    Order Status: Completed Specimen: Blood from Peripheral, Hand, Right Updated: 10/19/22 2032     Blood Culture, Routine No Growth to date      No Growth to date      No Growth to date      No Growth to date    Narrative:      Aerobic and anaerobic    Blood culture x two cultures. Draw prior to antibiotics. 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         Plan:     -New ileus versus bowel obstruction.  Colace and Miralax ordered.  Will follow.  NGT if starts to vomit.   -empiric tx with IV abx as CXR appears more pneumonia than pulmonary edema given the asymetry. I agree with  pulmonary and will commit patient to five day course of abx.   -Elevated BNP and new echo findings are more convincing of acute HF, however.  Started IV lasix and Cardiology consulted. Renal function climbing and thus I have put lasix on hold as CXR is now improved and no volume overload on exam.   -Imdur added per Cardiology  -BNP and troponin repeated and worse though volume and respiratory state is better  -CXR repeated and actually a little better  -PE felt to be unlikely. Will transition to tx dose lovenox  -Strict ins and outs  -Tele monitoring  -Attempting to wean her off supplemental oxygen during the day per her baseline.  -PT/OT      VTE Risk Mitigation (From admission, onward)           Ordered     enoxaparin injection 60 mg  Every 24 hours (non-standard times)         10/18/22 1102     IP VTE LOW RISK PATIENT  Once         10/16/22 2046     Place sequential compression device  Until discontinued         10/16/22 2046                      Department Hospital Medicine  Former[facility], [facility]  Joey Ramsey MD  Date of service: 10/20/2022

## 2022-10-21 NOTE — NURSING
Pt transferred via bed to room 2523. Pt in stable condition. VSS. Denies pain/discomfort. Safety maintained.

## 2022-10-21 NOTE — PROGRESS NOTES
[facility]  Department of Cardiology  Consult Note      PATIENT NAME: Blaire Cage  MRN: [id number]  TODAY'S DATE: 10/21/2022  ADMIT DATE: 10/16/2022                          CONSULT REQUESTED BY: Joey Ramsey MD    SUBJECTIVE     PRINCIPAL PROBLEM: Pneumonia      10/20/22    Patient lying in bed in no acute distress. Feels better. NO SOB. On room air doing well. Denies CP.         REASON FOR CONSULT:  Abnormal ECHO      HPI:    89 year old female patient known to myself and SARA De La Fuente. She has been having SOB, fatigue fever and cough and came to ER for evaluation was diagnosed with pneumonia  ECHO shows apical hypokinesis and we have been consulted for the same. She denies CP or Palpitations.         Patient information was obtained from patient, past medical records and ER records.   HISTORY OF PRESENT ILLNESS:   Blaire Cage is a 89 y.o. old female who  has a past medical history of Anemia due to multiple mechanisms (6/29/2018), Anemia, chronic disease (6/29/2018), Anemia, chronic renal failure, stage 3 (moderate) (6/29/2018), Anticoagulant long-term use, Aortic aneurysm, Chest pain, CHF (congestive heart failure), COPD (chronic obstructive pulmonary disease), COPD with acute exacerbation (1/9/2015), Depression, Diabetes mellitus type II, DVT (deep venous thrombosis) (06/09/2018), Encounter for blood transfusion, GERD (gastroesophageal reflux disease), GI bleed, Gout, 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 (8/7/2018), Hip arthritis (3/1/2016), Homocysteinemia (8/7/2018), Hyperlipidemia, Hypertension, Incontinent of urine, Normocytic normochromic anemia (6/29/2018), Oxygen dependent, PE (pulmonary thromboembolism) (06/09/2018), Pneumonia of right lower lobe due to infectious organism (9/11/2017), Syncope, Thyroid disease, Type 2 diabetes mellitus with stage 3 chronic kidney disease (1/18/2013), and UTI (urinary tract infection).. The patient presented to [facility] on 10/16/2022  with a primary complaint of Shortness of Breath and Weakness (Pt reports to ED with weakness/SOB starting last night. Pt has a hx of COPD(as needed/at night). Pt in nad at triage, vss, in wheelchair.)  .      Very pleasant 89-year-old female presents to the emergency room with shortness of breath.  The patient states she has been having shortness of breath and weakness for the past 2 days.  Patient states last night shortness of breath got progressively worse.  It she also endorses generalized weakness a cough and fatigue.  She describes her cough as productive in nature and yellow in color.  She denies fever chills hematemesis hemoptysis lightheadedness dizziness or syncope.       The patient does use oxygen at home at night she is on CPAP.  She has been using her oxygen more secondary to dyspnea with exertion.  She denied actual chest pain lightheadedness dizziness palpitations or syncope     She describes her symptoms as moderate to severe severity with no exacerbating or alleviating factors        Past medical history is significant for chronic kidney disease stage 3, hypertension, aortic aneurysm, CHF, COPD, type 2 diabetes, depression, arthritis, hyperlipidemia, normocytic anemia, oxygen dependent, pulmonary in emboli on 06/09/2018, thyroid disease.     In the emergency room she was found to have pneumonia and was started with a broad-spectrum antibiotic and DuoNeb treatments.  She was also found to have a component of heart failure and given IV Lasix.  We will monitor her output and intake closely and consult her cardiologist for further recommendations  REVIEW OF SYSTEMS:   10 Point Review of System was performed and was found to be negative except for that mentioned already in the HPI and   Review of Systems (Negative unless checked off)  Review of Systems   Constitutional:  Positive for chills and malaise/fatigue.   HENT: Negative.     Eyes: Negative.    Respiratory:  Positive for cough, sputum production  and shortness of breath.    Cardiovascular:  Positive for leg swelling.   Gastrointestinal: Negative.    Genitourinary: Negative.    Musculoskeletal: Negative.    Skin: Negative.    Neurological:  Positive for weakness.   Endo/Heme/Allergies: Negative.    Psychiatric/Behavioral: Negative.           Review of patient's allergies indicates:   Allergen Reactions    Carvedilol Other (See Comments)     Bradycardia and syncope    Boniva [ibandronate]     Codeine     Hydralazine analogues     Iodinated contrast media Hives    Kionex [sodium polystyrene sulfonate] Diarrhea     From encounter 12/11/17 for diarrhea--not true allergy.    Lisinopril     Morphine        Past Medical History:   Diagnosis Date    Anemia due to multiple mechanisms 6/29/2018    Anemia, chronic disease 6/29/2018    Anemia, chronic renal failure, stage 3 (moderate) 6/29/2018    Anticoagulant long-term use     aspirin    Aortic aneurysm     Chest pain     CHF (congestive heart failure)     COPD (chronic obstructive pulmonary disease)     COPD with acute exacerbation 1/9/2015    Depression     Diabetes mellitus type II     no longer on any DM meds    DVT (deep venous thrombosis) 06/09/2018    Encounter for blood transfusion     GERD (gastroesophageal reflux disease)     GI bleed     Gout     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 8/7/2018    Hip arthritis 3/1/2016    Homocysteinemia 8/7/2018    Hyperlipidemia     Hypertension     Incontinent of urine     Normocytic normochromic anemia 6/29/2018    Oxygen dependent     use oxygen as needed    PE (pulmonary thromboembolism) 06/09/2018    Pneumonia of right lower lobe due to infectious organism 9/11/2017    Syncope     Thyroid disease     hypothyroid    Type 2 diabetes mellitus with stage 3 chronic kidney disease 1/18/2013    UTI (urinary tract infection)      Past Surgical History:   Procedure Laterality Date    APPENDECTOMY      CHOLECYSTECTOMY      COLONOSCOPY Left 10/29/2020    Procedure: COLONOSCOPY;   Surgeon: Singh Kee III, MD;  Location: [facility];  Service: Endoscopy;  Laterality: Left;    ESOPHAGOGASTRODUODENOSCOPY Left 10/26/2020    Procedure: EGD (ESOPHAGOGASTRODUODENOSCOPY);  Surgeon: Kareem Gramajo MD;  Location: [facility] ENDO;  Service: Endoscopy;  Laterality: Left;    ESOPHAGOGASTRODUODENOSCOPY Left 10/28/2020    Procedure: EGD (ESOPHAGOGASTRODUODENOSCOPY);  Surgeon: Kareem Gramajo MD;  Location: [facility];  Service: Endoscopy;  Laterality: Left;    ESOPHAGOGASTRODUODENOSCOPY N/A 9/16/2021    Procedure: EGD (ESOPHAGOGASTRODUODENOSCOPY);  Surgeon: Kareem Gramajo MD;  Location: [facility];  Service: Endoscopy;  Laterality: N/A;    FRACTURE SURGERY      right hip     HERNIA REPAIR      groin    HYSTERECTOMY      INSERTION OF IMPLANTABLE LOOP RECORDER N/A 12/12/2018    Procedure: Insertion, Implantable Loop Recorder;  Surgeon: Ashok Herbert MD;  Location: [facility] CATH LAB;  Service: Cardiology;  Laterality: N/A;    PERCUTANEOUS PINNING OF HIP Left 3/23/2019    Procedure: PINNING, HIP, PERCUTANEOUS;  Surgeon: Jorje Hamlin MD;  Location: [facility] OR;  Service: Orthopedics;  Laterality: Left;    SMALL BOWEL ENTEROSCOPY N/A 10/29/2020    Procedure: ENTEROSCOPY;  Surgeon: Singh Kee III, MD;  Location: [facility];  Service: Endoscopy;  Laterality: N/A;    VARICOSE VEIN SURGERY       Social History     Tobacco Use    Smoking status: Former     Packs/day: 0.35     Years: 44.00     Pack years: 15.40     Types: Cigarettes     Start date: 1970    Smokeless tobacco: Never    Tobacco comments:     1/08/2015   Substance Use Topics    Alcohol use: No     Alcohol/week: 0.0 standard drinks    Drug use: No        REVIEW OF SYSTEMS  +cough  +wheezing  +sob -improving      OBJECTIVE     VITAL SIGNS (Most Recent)  Temp: 98.3 °F (36.8 °C) (10/21/22 1524)  Pulse: 70 (10/21/22 1524)  Resp: 15 (10/21/22 1524)  BP: (!) 114/54 (10/21/22 1524)  SpO2: 98 % (10/21/22 1524)    VENTILATION STATUS  Resp: 15 (10/21/22  1524)  SpO2: 98 % (10/21/22 1524)  Oxygen Concentration (%):  [28] 28    I & O (Last 24H):  Intake/Output Summary (Last 24 hours) at 10/21/2022 1633  Last data filed at 10/21/2022 1529  Gross per 24 hour   Intake 960 ml   Output 600 ml   Net 360 ml       WEIGHTS  Wt Readings from Last 3 Encounters:   10/21/22 0350 57.5 kg (126 lb 12.2 oz)   10/16/22 2233 56.5 kg (124 lb 9 oz)   10/16/22 1621 57.6 kg (127 lb)   10/18/22 1200 56.2 kg (124 lb)   10/12/22 1606 57 kg (125 lb 9.6 oz)       PHYSICAL EXAM  GENERAL: well built, well nourished, well-developed in no apparent distress alert and oriented.   HEENT: Normocephalic. Pupils normal and conjunctivae normal.  Mucous membranes normal, no cyanosis or icterus, trachea central,no pallor or icterus is noted..   NECK: No JVD. No bruit..   THYROID: Thyroid not enlarged. No nodules present..   CARDIAC: systolic murmur noted  CHEST ANATOMY: normal.   LUNGS: wheezing  ABDOMEN: Soft no masses or organomegaly.  No abdomen pulsations or bruits.  Normal bowel sounds. No pulsations and no masses felt, No guarding or rebound.   URINARY: No carter catheter   EXTREMITIES: No cyanosis, clubbing or edema noted at this time., no calf tenderness bilaterally.   PERIPHERAL VASCULAR SYSTEM: Good palpable distal pulses.   CENTRAL NERVOUS SYSTEM: No focal motor or sensory deficits noted.   SKIN: Skin without lesions, moist, well perfused.   MUSCLE STRENGTH & TONE: No noteable weakness, atrophy or abnormal movement.     HOME MEDICATIONS:  No current facility-administered medications on file prior to encounter.     Current Outpatient Medications on File Prior to Encounter   Medication Sig Dispense Refill    albuterol (PROVENTIL) 2.5 mg /3 mL (0.083 %) nebulizer solution Take 3 mLs (2.5 mg total) by nebulization every 6 (six) hours as needed for Wheezing or Shortness of Breath. USE 1 VIAL IN NEBULIZER EVERY 6 HOURS AS NEEDED FOR WHEEZING. RESCUE 360 each 3    allopurinoL (ZYLOPRIM) 100 MG tablet Take  1 tablet (100 mg total) by mouth once daily. 90 tablet 3    ascorbic acid, vitamin C, (VITAMIN C) 500 MG tablet Take 500 mg by mouth once daily.      calcium carbonate (OS-TASIA) 500 mg calcium (1,250 mg) tablet Take 1 tablet by mouth 2 (two) times daily.      diltiaZEM (CARDIZEM CD) 180 MG 24 hr capsule Take 1 capsule (180 mg total) by mouth once daily. 90 capsule 3    fish oil-omega-3 fatty acids 300-1,000 mg capsule Take 2 g by mouth every evening.      fluticasone-umeclidin-vilanter (TRELEGY ELLIPTA) 200-62.5-25 mcg inhaler Inhale 1 puff into the lungs once daily. 60 each 11    folic acid-vit B6-vit B12 2.5-25-2 mg (FOLBIC) 2.5-25-2 mg Tab Take 1 tablet by mouth once daily.      furosemide (LASIX) 20 MG tablet Take 1 tablet only as needed, for swelling, increase SOB, weight gain of 3 lbs overnight or 5 lbs for the week 30 tablet prn    levothyroxine (SYNTHROID) 88 MCG tablet Take 1 tablet (88 mcg total) by mouth once daily. 90 tablet 3    losartan (COZAAR) 25 MG tablet Take 1 tablet (25 mg total) by mouth once daily. (Patient taking differently: Take 25 mg by mouth once daily. prn) 90 tablet 3    mepolizumab 100 mg/mL AtIn Inject 1 mL (100 mg total) into the skin every 28 days. 1 mL 12    montelukast (SINGULAIR) 10 mg tablet TAKE 1 TABLET BY MOUTH ONCE DAILY IN THE EVENING (Patient taking differently: Take 10 mg by mouth every evening.) 90 tablet 3    multivitamin (THERAGRAN) tablet Take 1 tablet by mouth once daily.      omeprazole (PRILOSEC) 40 MG capsule Take 1 capsule (40 mg total) by mouth once daily. 90 capsule 3    rosuvastatin (CRESTOR) 20 MG tablet Take 1 tablet (20 mg total) by mouth once daily. 90 tablet 3    sertraline (ZOLOFT) 50 MG tablet Take 1 tablet (50 mg total) by mouth once daily. 90 tablet 1    simethicone 180 mg Cap Take 1 capsule by mouth 3 (three) times daily as needed.      acetaminophen (TYLENOL) 325 MG tablet Take 325 mg by mouth every 6 (six) hours as needed for Pain.      ciclopirox  (PENLAC) 8 % Soln Apply topically nightly. 6.6 mL 3    ferrous sulfate (FEOSOL) 325 mg (65 mg iron) Tab tablet Take 325 mg by mouth once daily.      polyethylene glycol (GLYCOLAX) 17 gram/dose powder Take 17 g by mouth 2 (two) times daily. 850 g 3    [DISCONTINUED] clobetasol 0.05% (TEMOVATE) 0.05 % Oint Apply topically 2 (two) times daily. 45 g 1    [DISCONTINUED] diclofenac sodium (VOLTAREN) 1 % Gel Apply 2 g topically once daily. (Patient taking differently: Apply 2 g topically as needed.) 100 g prn    [DISCONTINUED] ketoconazole (NIZORAL) 2 % shampoo Apply topically twice a week. (Patient taking differently: Apply 1 application topically twice a week.) 120 mL 2    [DISCONTINUED] meclizine (ANTIVERT) 25 mg tablet Take 1 tablet (25 mg total) by mouth 3 (three) times daily as needed. 20 tablet prn    [DISCONTINUED] nitroGLYCERIN (NITROSTAT) 0.4 MG SL tablet Place 1 tablet (0.4 mg total) under the tongue every 5 (five) minutes as needed for Chest pain. As needed (Patient taking differently: Place 0.4 mg under the tongue every 5 (five) minutes as needed for Chest pain. Seek medical help if pain is not relieved after the third dose.) 30 tablet 3    [DISCONTINUED] nystatin (MYCOSTATIN) powder Apply topically 4 (four) times daily. 60 g 1       SCHEDULED MEDS:   ascorbic acid (vitamin C)  500 mg Oral Daily    aspirin  81 mg Oral Daily    atorvastatin  40 mg Oral Daily    diltiaZEM  180 mg Oral Daily    docusate sodium  100 mg Oral Daily    enoxparin  1 mg/kg Subcutaneous Q24H    isosorbide mononitrate  30 mg Oral Daily    lactulose  20 g Oral TID    levoFLOXacin  500 mg Intravenous Q48H    levothyroxine  88 mcg Oral Before breakfast    losartan  25 mg Oral Daily    montelukast  10 mg Oral QHS    NON FORMULARY MEDICATION 1 puff  1 puff Inhalation Daily    polyethylene glycol  17 g Oral Daily    sertraline  50 mg Oral Daily       CONTINUOUS INFUSIONS:    PRN MEDS:albuterol-ipratropium, dextrose 10%, dextrose 10%, glucagon  (human recombinant), insulin aspart U-100, LORazepam, melatonin, ondansetron, polyethylene glycol, sodium chloride 0.9%, sodium chloride 0.9%    LABS AND DIAGNOSTICS     CBC LAST 3 DAYS  Recent Labs   Lab 10/18/22  0417 10/19/22  0405 10/20/22  0524   WBC 11.05 8.02 7.78   RBC 2.72* 2.44* 2.64*   HGB 8.7* 7.8* 8.4*   HCT 27.4* 24.8* 26.9*   * 102* 102*   MCH 32.0* 32.0* 31.8*   MCHC 31.8* 31.5* 31.2*   RDW 13.6 13.9 14.1    193 176   MPV 11.4 11.4 11.6   GRAN 75.4*  8.3* 71.3  5.7 72.7  5.7   LYMPH 13.4*  1.5 16.8*  1.4 14.4*  1.1   MONO 10.3  1.1* 10.7  0.9 11.2  0.9   BASO 0.02 0.02 0.03   NRBC 0 0 0       COAGULATION LAST 3 DAYS  No results for input(s): LABPT, INR, APTT in the last 168 hours.    CHEMISTRY LAST 3 DAYS  Recent Labs   Lab 10/17/22  0506 10/18/22  1108 10/19/22  0405 10/20/22  0524 10/21/22  0440      < > 142 138 140   K 4.4   < > 4.0 4.2 4.6      < > 107 106 106   CO2 20*   < > 27 25 27   ANIONGAP 13   < > 8 7* 7*   BUN 41*   < > 55* 62* 62*   CREATININE 1.7*   < > 2.0* 2.1* 2.0*   *   < > 106 127* 109   CALCIUM 8.5*   < > 8.3* 8.0* 8.3*   MG 1.8  --   --   --   --    ALBUMIN 2.8*   < > 2.6* 2.6* 2.5*   PROT 5.8*   < > 5.3* 5.3* 5.1*   ALKPHOS 70   < > 60 66 56   ALT 21   < > 20 20 20   AST 29   < > 27 26 26   BILITOT 0.5   < > 0.6 0.6 0.5    < > = values in this interval not displayed.       CARDIAC PROFILE LAST 3 DAYS  Recent Labs   Lab 10/16/22  1734 10/16/22  2121 10/19/22  1745   BNP 2,653*  --  3,449*   TROPONINI 0.404* 0.389* 1.370*       ENDOCRINE LAST 3 DAYS  No results for input(s): TSH, PROCAL in the last 168 hours.    LAST ARTERIAL BLOOD GAS  ABG  No results for input(s): PH, PO2, PCO2, HCO3, BE in the last 168 hours.    LAST 7 DAYS MICROBIOLOGY   Microbiology Results (last 7 days)       Procedure Component Value Units Date/Time    Blood culture x two cultures. Draw prior to antibiotics. [[id number]] Collected: 10/16/22 1945    Order Status:  Completed Specimen: Blood from Peripheral, Antecubital, Right Updated: 10/20/22 2032     Blood Culture, Routine No Growth to date      No Growth to date      No Growth to date      No Growth to date      No Growth to date    Narrative:      Aerobic and anaerobic    Blood culture x two cultures. Draw prior to antibiotics. [[id number]] Collected: 10/16/22 1840    Order Status: Completed Specimen: Blood from Peripheral, Hand, Right Updated: 10/20/22 2032     Blood Culture, Routine No Growth to date      No Growth to date      No Growth to date      No Growth to date      No Growth to date    Narrative:      Aerobic and anaerobic            MOST RECENT IMAGING  X-Ray KUB  REASON: abd pain    TECHNIQUE: AP abdominal radiograph.    COMPARISON: None.    FINDINGS:    Multiple loops of gas and stool distended bowel noted. There is blunting of the bilateral costophrenic angles. No gross organomegaly. Degenerative changes of the spine and pelvis noted. Bilateral hip orthopedic hardware observed with severe degenerative changes of the left hip joint.    IMPRESSION:    Multiple loops of gas and stool distended bowel noted, could reflect ileus or low-grade obstruction.    Electronically signed by:  Jorje Joyce DO  10/20/2022 5:02 PM CDT Workstation: [id number]  X-Ray Chest PA And Lateral  Reason: chf Shortness of Breath; Weakness    FINDINGS:  PA and lateral chest is compared to 10/19/2022 show cardiomegaly. There is a heart monitor device.    There are tiny pleural effusions with bibasilar airspace disease suggestive of atelectasis versus infiltrates. The upper lobes are clear. Pulmonary vasculature is normal. Bones are normal.    IMPRESSION:  Mild cardiomegaly with tiny pleural effusions and bibasilar atelectasis or infiltrates    Electronically signed by:  Jonna Lyn MD  10/20/2022 7:28 AM CDT Workstation: [id number]      ECHOCARDIOGRAM RESULTS (last 5)  Results for orders placed during the hospital encounter of  10/16/22    Echo    Interpretation Summary  · Moderate concentric hypertrophy and mildly decreased systolic function.  · The estimated ejection fraction is 45%.  · There are segmental left ventricular wall motion abnormalities. Anterior apical akinesia  · Moderate to severe tricuspid regurgitation.  · Moderate-to-severe mitral regurgitation.  · There is moderate to severe pulmonary hypertension.  · Severe left atrial enlargement.  · Grade II left ventricular diastolic dysfunction.  · Atrial fibrillation not observed.  · There is right ventricular hypertrophy.  · There is mild mitral stenosis. Mitral valve not well seen because of heavy mitral annular calcification      Results for orders placed during the hospital encounter of 03/21/22    Echo    Interpretation Summary  · The estimated PA systolic pressure is 43 mmHg.  · The left ventricle is normal in size with mild concentric hypertrophy and normal systolic function.  · The estimated ejection fraction is 65%.  · Grade II left ventricular diastolic dysfunction.  · Normal right ventricular size with normal right ventricular systolic function.  · Moderate mitral regurgitation.  · There is moderate mitral stenosis.  · There is moderate mitral leaflet calcification.  · Mild tricuspid regurgitation.  · There is mild pulmonary hypertension.  · There is mild aortic valve stenosis.  · Aortic valve area is 1.77 cm2; peak velocity is 2.08 m/s; mean gradient is 11 mmHg.  · Moderate left atrial enlargement.  · Mild right atrial enlargement.      Results for orders placed during the hospital encounter of 10/19/21    Echo    Interpretation Summary  · The estimated PA systolic pressure is 44 mmHg.  · The left ventricle is normal in size with severe concentric hypertrophy and normal systolic function.  · The estimated ejection fraction is 60%.  · Grade II left ventricular diastolic dysfunction.  · Mild right ventricular enlargement.  · Mild left atrial enlargement.  · Mild to  moderate tricuspid regurgitation.  · Mild-to-moderate mitral regurgitation.  · Normal central venous pressure (3 mmHg).      Results for orders placed during the hospital encounter of 05/07/20    Echo Color Flow Doppler? Yes; Bubble Contrast? Yes    Interpretation Summary  · Concentric left ventricular hypertrophy.  · The mean diastolic gradient across the mitral valve is 5 mmHg at a heart rate of 57 bpm.  · Normal left ventricular systolic function. The estimated ejection fraction is 65%.  · Grade II (moderate) left ventricular diastolic dysfunction consistent with pseudonormalization.  · Normal right ventricular systolic function.  · Severe left atrial enlargement.  · Mild right atrial enlargement.  · Mild mitral regurgitation.  · Mild to moderate tricuspid regurgitation.  · Normal central venous pressure (3 mmHg).  · The estimated PA systolic pressure is 50 mmHg.  · Pulmonary hypertension present.  · No PFO on color flow or saline bubble study      CURRENT/PREVIOUS VISIT EKG  Results for orders placed or performed during the hospital encounter of 10/16/22   EKG 12-LEAD    Collection Time: 10/16/22  6:27 PM    Narrative    Test Reason : R07.9,    Vent. Rate : 069 BPM     Atrial Rate : 069 BPM     P-R Int : 184 ms          QRS Dur : 090 ms      QT Int : 430 ms       P-R-T Axes : 052 038 -48 degrees     QTc Int : 460 ms    Normal sinus rhythm  Anteroseptal infarct (cited on or before 20-JUL-2022)  Abnormal ECG  When compared with ECG of 20-JUL-2022 17:02,  Questionable change in initial forces of Anterior leads  Non-specific change in ST segment in Anterior leads  Nonspecific T wave abnormality now evident in Inferior leads  T wave inversion now evident in Anterior leads  Confirmed by Gualberto De La Fuente MD ([id number]) on 10/19/2022 6:05:19 PM    Referred By: [email or web address]   SELF           Confirmed By:Gualberto De La Fuente MD           ASSESSMENT/PLAN:     Active Hospital Problems    Diagnosis    *Pneumonia    Acute on chronic combined  systolic and diastolic heart failure       ASSESSMENT & PLAN:     Acute on Chronic CHFrEF- Improving  Pneumonia  Moderate TR  Moderate MR  Apical Hypokinesis  PH  Anemia- hg 7.8 -8.4 TODAY  SHALINI on CKD    RECOMMENDATIONS:    Give iv lasix x1 today  Add ASA 81 mg daily  Continue Imdur 30 mg daily  When lungs clear and heal up consider changing to beta blocker from calcium channel blocker  No CP currently  She will need OP Follow up with Dr. SARA Perez NP  [facility]  Department of Cardiology  Date of Service: 10/21/2022        PATIENT SEEN AND EXAMINED..  She appears to be more comfortable.  On nasal cannula.  Her lungs reveal bilateral coarse rhonchi.  She does have a productive cough.  Agree with Lasix today  Recheck BUN and chest x-ray and BNP before discharge.  Discussed with the family    Shelton Desouza M.D.  [facility]  Department of Cardiology  Date of Service: 10/21/2022  8:45 AM

## 2022-10-21 NOTE — CARE UPDATE
10/21/22 0732   Patient Assessment/Suction   Level of Consciousness (AVPU) alert   All Lung Fields Breath Sounds clear;diminished   Skin Integrity   $ Wound Care Tech Time 15 min   Area Observed Bridge of nose   Skin Appearance without discoloration   PRE-TX-O2   O2 Device (Oxygen Therapy) nasal cannula   $ Is the patient on Low Flow Oxygen? Yes   Flow (L/min) 2   SpO2 98 %   Pulse Oximetry Type Continuous   $ Pulse Oximetry - Multiple Charge Pulse Oximetry - Multiple   Pulse 66   Resp 15   Aerosol Therapy   $ Aerosol Therapy Charges Aerosol Treatment   Daily Review of Necessity (SVN) completed   Respiratory Treatment Status (SVN) given   Treatment Route (SVN) mask   Patient Position (SVN) semi-Shetty's   Post Treatment Assessment (SVN) increased aeration   Signs of Intolerance (SVN) none   Breath Sounds Post-Respiratory Treatment   Post-treatment Heart Rate (beats/min) 65   Post-treatment Resp Rate (breaths/min) 15   Education   $ Education Bronchodilator;15 min   Respiratory Evaluation   $ Care Plan Tech Time 15 min

## 2022-10-21 NOTE — CARE UPDATE
10/20/22 2341   Patient Assessment/Suction   Respiratory Effort Unlabored   ESPERANZA Breath Sounds clear   LLL Breath Sounds diminished   RUL Breath Sounds clear   RML Breath Sounds diminished   RLL Breath Sounds diminished   Rhythm/Pattern, Respiratory pattern regular   Skin Integrity   $ Wound Care Tech Time 15 min   Area Observed Bridge of nose   Skin Appearance without discoloration   PRE-TX-O2   O2 Device (Oxygen Therapy) BiPAP   Oxygen Concentration (%) 28   SpO2 97 %   Pulse Oximetry Type Continuous   Pulse 62   Resp 20   Aerosol Therapy   $ Aerosol Therapy Charges PRN treatment not required   Ready to Wean/Extubation Screen   FIO2<=50 (chart decimal) 0.28   Preset CPAP/BiPAP Settings   Mode Of Delivery BiPAP   $ Is patient using? Yes   Size of Mask Small   Sized Appropriately? Yes   Equipment Type V60   Airway Device Type small full face mask   Ipap 10   EPAP (cm H2O) 5   Pressure Support (cm H2O) 5   Set Rate (Breaths/Min) 16   ITime (sec) 0.9   Rise Time (sec) 3   Patient CPAP/BiPAP Settings   RR Total (Breaths/Min) 20   Tidal Volume (mL) 457   VE Minute Ventilation (L/min) 10.2 L/min   Peak Inspiratory Pressure (cm H2O) 10   TiTOT (%) 40   Total Leak (L/Min) 10   Patient Trigger - ST Mode Only (%) 96   CPAP/BiPAP Backup Settings   IPAP Backup 10 cmH2O   EPAP Backup 5 cmH2O   Backup Rate 16 breaths per minute (bpm)   FIO2 Backup 0.28 %   ITIME Backup 0.9 seconds   Rise Time Backup 3 seconds   CPAP/BiPAP Alarms   High Pressure (cm H2O) 40   Low Pressure (cm H2O) 10   Minute Ventilation (L/Min) 3   High RR (breaths/min) 40   Low RR (breaths/min) 7   Apnea (Sec) 20   Education   $ Education BiPAP   Respiratory Evaluation   $ Care Plan Tech Time 15 min   $ Eval/Re-eval Charges Re-evaluation   Evaluation For   (care plan)

## 2022-10-22 NOTE — CARE UPDATE
10/22/22 0741   Patient Assessment/Suction   Level of Consciousness (AVPU) alert   Respiratory Effort Unlabored   Expansion/Accessory Muscles/Retractions no use of accessory muscles   All Lung Fields Breath Sounds diminished   Skin Integrity   $ Wound Care Tech Time 15 min   Area Observed Bridge of nose   Skin Appearance without discoloration   PRE-TX-O2   O2 Device (Oxygen Therapy) nasal cannula   $ Is the patient on Low Flow Oxygen? Yes   Flow (L/min) 2   SpO2 96 %   Pulse 82   Resp 20   Aerosol Therapy   $ Aerosol Therapy Charges Aerosol Treatment   Daily Review of Necessity (SVN) completed   Treatment Route (SVN) mask;oxygen   Patient Position (SVN) HOB elevated   Post Treatment Assessment (SVN) breath sounds unchanged   Signs of Intolerance (SVN) none   Breath Sounds Post-Respiratory Treatment   Throughout All Fields Post-Treatment All Fields   Throughout All Fields Post-Treatment aeration increased   Post-treatment Heart Rate (beats/min) 84   Post-treatment Resp Rate (breaths/min) 25   Preset CPAP/BiPAP Settings   Mode Of Delivery BiPAP   $ CPAP/BiPAP Daily Charge BiPAP/CPAP Daily   Respiratory Evaluation   $ Care Plan Tech Time 15 min

## 2022-10-22 NOTE — CARE UPDATE
10/21/22 3608   Patient Assessment/Suction   Level of Consciousness (AVPU) alert   Respiratory Effort Unlabored   Expansion/Accessory Muscles/Retractions no use of accessory muscles   All Lung Fields Breath Sounds clear;wheezes, expiratory   Rhythm/Pattern, Respiratory no shortness of breath reported   Cough Frequency no cough   PRE-TX-O2   O2 Device (Oxygen Therapy) nasal cannula   $ Is the patient on Low Flow Oxygen? Yes   Flow (L/min) 2   SpO2 98 %   Pulse Oximetry Type Continuous   $ Pulse Oximetry - Multiple Charge Pulse Oximetry - Multiple   Pulse 75   Resp (!) 22   Positioning   Head of Bed (HOB) Positioning HOB elevated;HOB at 30 degrees   Respiratory Evaluation   $ Care Plan Tech Time 15 min

## 2022-10-22 NOTE — PROGRESS NOTES
[facility]  Department of Cardiology  Consult Note      PATIENT NAME: Blaire Cage  MRN: [id number]  TODAY'S DATE: 10/22/2022  ADMIT DATE: 10/16/2022                          CONSULT REQUESTED BY: Joey Ramsey MD    SUBJECTIVE     PRINCIPAL PROBLEM: Pneumonia    10/22/22:  Patient seen resting in bed. She states her breathing is stable.     10//20/22    Patient lying in bed in no acute distress. Feels better. NO SOB. On room air doing well. Denies CP.         REASON FOR CONSULT:  Abnormal ECHO      HPI:    89 year old female patient known to myself and SARA De La Fuente. She has been having SOB, fatigue fever and cough and came to ER for evaluation was diagnosed with pneumonia  ECHO shows apical hypokinesis and we have been consulted for the same. She denies CP or Palpitations.         Patient information was obtained from patient, past medical records and ER records.   HISTORY OF PRESENT ILLNESS:   Blaire Cage is a 89 y.o. old female who  has a past medical history of Anemia due to multiple mechanisms (6/29/2018), Anemia, chronic disease (6/29/2018), Anemia, chronic renal failure, stage 3 (moderate) (6/29/2018), Anticoagulant long-term use, Aortic aneurysm, Chest pain, CHF (congestive heart failure), COPD (chronic obstructive pulmonary disease), COPD with acute exacerbation (1/9/2015), Depression, Diabetes mellitus type II, DVT (deep venous thrombosis) (06/09/2018), Encounter for blood transfusion, GERD (gastroesophageal reflux disease), GI bleed, Gout, 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 (8/7/2018), Hip arthritis (3/1/2016), Homocysteinemia (8/7/2018), Hyperlipidemia, Hypertension, Incontinent of urine, Normocytic normochromic anemia (6/29/2018), Oxygen dependent, PE (pulmonary thromboembolism) (06/09/2018), Pneumonia of right lower lobe due to infectious organism (9/11/2017), Syncope, Thyroid disease, Type 2 diabetes mellitus with stage 3 chronic kidney disease (1/18/2013), and UTI (urinary  tract infection).. The patient presented to [facility] on 10/16/2022 with a primary complaint of Shortness of Breath and Weakness (Pt reports to ED with weakness/SOB starting last night. Pt has a hx of COPD(as needed/at night). Pt in nad at triage, vss, in wheelchair.)  .      Very pleasant 89-year-old female presents to the emergency room with shortness of breath.  The patient states she has been having shortness of breath and weakness for the past 2 days.  Patient states last night shortness of breath got progressively worse.  It she also endorses generalized weakness a cough and fatigue.  She describes her cough as productive in nature and yellow in color.  She denies fever chills hematemesis hemoptysis lightheadedness dizziness or syncope.       The patient does use oxygen at home at night she is on CPAP.  She has been using her oxygen more secondary to dyspnea with exertion.  She denied actual chest pain lightheadedness dizziness palpitations or syncope     She describes her symptoms as moderate to severe severity with no exacerbating or alleviating factors        Past medical history is significant for chronic kidney disease stage 3, hypertension, aortic aneurysm, CHF, COPD, type 2 diabetes, depression, arthritis, hyperlipidemia, normocytic anemia, oxygen dependent, pulmonary in emboli on 06/09/2018, thyroid disease.     In the emergency room she was found to have pneumonia and was started with a broad-spectrum antibiotic and DuoNeb treatments.  She was also found to have a component of heart failure and given IV Lasix.  We will monitor her output and intake closely and consult her cardiologist for further recommendations  REVIEW OF SYSTEMS:     Review of Systems     HENT: Negative.     Eyes: Negative.    Respiratory: improved   Cardiovascular:  negative for leg swelling  Gastrointestinal: Negative.  + constipation  Genitourinary: Negative.    Musculoskeletal: Negative.    Skin: Negative.     Neurological:  Positive for weakness.   Endo/Heme/Allergies: Negative.    Psychiatric/Behavioral: Negative.           Review of patient's allergies indicates:   Allergen Reactions    Carvedilol Other (See Comments)     Bradycardia and syncope    Boniva [ibandronate]     Codeine     Hydralazine analogues     Iodinated contrast media Hives    Kionex [sodium polystyrene sulfonate] Diarrhea     From encounter 12/11/17 for diarrhea--not true allergy.    Lisinopril     Morphine        Past Medical History:   Diagnosis Date    Anemia due to multiple mechanisms 6/29/2018    Anemia, chronic disease 6/29/2018    Anemia, chronic renal failure, stage 3 (moderate) 6/29/2018    Anticoagulant long-term use     aspirin    Aortic aneurysm     Chest pain     CHF (congestive heart failure)     COPD (chronic obstructive pulmonary disease)     COPD with acute exacerbation 1/9/2015    Depression     Diabetes mellitus type II     no longer on any DM meds    DVT (deep venous thrombosis) 06/09/2018    Encounter for blood transfusion     GERD (gastroesophageal reflux disease)     GI bleed     Gout     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 8/7/2018    Hip arthritis 3/1/2016    Homocysteinemia 8/7/2018    Hyperlipidemia     Hypertension     Incontinent of urine     Normocytic normochromic anemia 6/29/2018    Oxygen dependent     use oxygen as needed    PE (pulmonary thromboembolism) 06/09/2018    Pneumonia of right lower lobe due to infectious organism 9/11/2017    Syncope     Thyroid disease     hypothyroid    Type 2 diabetes mellitus with stage 3 chronic kidney disease 1/18/2013    UTI (urinary tract infection)      Past Surgical History:   Procedure Laterality Date    APPENDECTOMY      CHOLECYSTECTOMY      COLONOSCOPY Left 10/29/2020    Procedure: COLONOSCOPY;  Surgeon: Singh Kee III, MD;  Location: [facility];  Service: Endoscopy;  Laterality: Left;    ESOPHAGOGASTRODUODENOSCOPY Left 10/26/2020    Procedure: EGD  (ESOPHAGOGASTRODUODENOSCOPY);  Surgeon: Kareem Gramajo MD;  Location: [facility] ENDO;  Service: Endoscopy;  Laterality: Left;    ESOPHAGOGASTRODUODENOSCOPY Left 10/28/2020    Procedure: EGD (ESOPHAGOGASTRODUODENOSCOPY);  Surgeon: Kareem Gramajo MD;  Location: [facility] ENDO;  Service: Endoscopy;  Laterality: Left;    ESOPHAGOGASTRODUODENOSCOPY N/A 9/16/2021    Procedure: EGD (ESOPHAGOGASTRODUODENOSCOPY);  Surgeon: Kareem Gramajo MD;  Location: [facility] ENDO;  Service: Endoscopy;  Laterality: N/A;    FRACTURE SURGERY      right hip     HERNIA REPAIR      groin    HYSTERECTOMY      INSERTION OF IMPLANTABLE LOOP RECORDER N/A 12/12/2018    Procedure: Insertion, Implantable Loop Recorder;  Surgeon: Ashok Herbert MD;  Location: [facility] CATH LAB;  Service: Cardiology;  Laterality: N/A;    PERCUTANEOUS PINNING OF HIP Left 3/23/2019    Procedure: PINNING, HIP, PERCUTANEOUS;  Surgeon: Jorje Hamlin MD;  Location: [facility] OR;  Service: Orthopedics;  Laterality: Left;    SMALL BOWEL ENTEROSCOPY N/A 10/29/2020    Procedure: ENTEROSCOPY;  Surgeon: Singh Kee III, MD;  Location: [facility];  Service: Endoscopy;  Laterality: N/A;    VARICOSE VEIN SURGERY       Social History     Tobacco Use    Smoking status: Former     Packs/day: 0.35     Years: 44.00     Pack years: 15.40     Types: Cigarettes     Start date: 1970    Smokeless tobacco: Never    Tobacco comments:     1/08/2015   Substance Use Topics    Alcohol use: No     Alcohol/week: 0.0 standard drinks    Drug use: No        OBJECTIVE     VITAL SIGNS (Most Recent)  Temp: 97 °F (36.1 °C) (10/22/22 1100)  Pulse: 85 (10/22/22 1100)  Resp: 20 (10/22/22 1100)  BP: (!) 116/57 (10/22/22 1100)  SpO2: 97 % (10/22/22 1100)    VENTILATION STATUS  Resp: 20 (10/22/22 1100)  SpO2: 97 % (10/22/22 1100)       I & O (Last 24H):  Intake/Output Summary (Last 24 hours) at 10/22/2022 1422  Last data filed at 10/22/2022 0900  Gross per 24 hour   Intake 600 ml   Output 350 ml   Net 250 ml          WEIGHTS  Wt Readings from Last 3 Encounters:   10/22/22 0345 57.6 kg (126 lb 15.8 oz)   10/21/22 0350 57.5 kg (126 lb 12.2 oz)   10/16/22 2233 56.5 kg (124 lb 9 oz)   10/16/22 1621 57.6 kg (127 lb)   10/18/22 1200 56.2 kg (124 lb)   10/12/22 1606 57 kg (125 lb 9.6 oz)       PHYSICAL EXAM  GENERAL: well built, well nourished, well-developed in no apparent distress alert and oriented.   HEENT: Normocephalic. Pupils normal and conjunctivae normal.  Mucous membranes normal, no cyanosis or icterus, trachea central,no pallor or icterus is noted..   NECK: No JVD. No bruit..   CARDIAC: systolic murmur noted  CHEST ANATOMY: normal.   LUNGS: wheezing  ABDOMEN: Soft no masses or organomegaly.  No abdomen pulsations or bruits.  Normal bowel sounds. No pulsations and no masses felt, No guarding or rebound.   URINARY: No carter catheter   EXTREMITIES: No cyanosis, clubbing or edema noted at this time., no calf tenderness bilaterally.   PERIPHERAL VASCULAR SYSTEM: Good palpable distal pulses.   CENTRAL NERVOUS SYSTEM: No focal motor or sensory deficits noted.   SKIN: Skin without lesions, moist, well perfused.   MUSCLE STRENGTH & TONE: No noteable weakness, atrophy or abnormal movement.     HOME MEDICATIONS:  No current facility-administered medications on file prior to encounter.     Current Outpatient Medications on File Prior to Encounter   Medication Sig Dispense Refill    albuterol (PROVENTIL) 2.5 mg /3 mL (0.083 %) nebulizer solution Take 3 mLs (2.5 mg total) by nebulization every 6 (six) hours as needed for Wheezing or Shortness of Breath. USE 1 VIAL IN NEBULIZER EVERY 6 HOURS AS NEEDED FOR WHEEZING. RESCUE 360 each 3    allopurinoL (ZYLOPRIM) 100 MG tablet Take 1 tablet (100 mg total) by mouth once daily. 90 tablet 3    ascorbic acid, vitamin C, (VITAMIN C) 500 MG tablet Take 500 mg by mouth once daily.      calcium carbonate (OS-TASIA) 500 mg calcium (1,250 mg) tablet Take 1 tablet by mouth 2 (two) times daily.       diltiaZEM (CARDIZEM CD) 180 MG 24 hr capsule Take 1 capsule (180 mg total) by mouth once daily. 90 capsule 3    fish oil-omega-3 fatty acids 300-1,000 mg capsule Take 2 g by mouth every evening.      fluticasone-umeclidin-vilanter (TRELEGY ELLIPTA) 200-62.5-25 mcg inhaler Inhale 1 puff into the lungs once daily. 60 each 11    folic acid-vit B6-vit B12 2.5-25-2 mg (FOLBIC) 2.5-25-2 mg Tab Take 1 tablet by mouth once daily.      furosemide (LASIX) 20 MG tablet Take 1 tablet only as needed, for swelling, increase SOB, weight gain of 3 lbs overnight or 5 lbs for the week 30 tablet prn    levothyroxine (SYNTHROID) 88 MCG tablet Take 1 tablet (88 mcg total) by mouth once daily. 90 tablet 3    losartan (COZAAR) 25 MG tablet Take 1 tablet (25 mg total) by mouth once daily. (Patient taking differently: Take 25 mg by mouth once daily. prn) 90 tablet 3    mepolizumab 100 mg/mL AtIn Inject 1 mL (100 mg total) into the skin every 28 days. 1 mL 12    montelukast (SINGULAIR) 10 mg tablet TAKE 1 TABLET BY MOUTH ONCE DAILY IN THE EVENING (Patient taking differently: Take 10 mg by mouth every evening.) 90 tablet 3    multivitamin (THERAGRAN) tablet Take 1 tablet by mouth once daily.      omeprazole (PRILOSEC) 40 MG capsule Take 1 capsule (40 mg total) by mouth once daily. 90 capsule 3    rosuvastatin (CRESTOR) 20 MG tablet Take 1 tablet (20 mg total) by mouth once daily. 90 tablet 3    sertraline (ZOLOFT) 50 MG tablet Take 1 tablet (50 mg total) by mouth once daily. 90 tablet 1    simethicone 180 mg Cap Take 1 capsule by mouth 3 (three) times daily as needed.      acetaminophen (TYLENOL) 325 MG tablet Take 325 mg by mouth every 6 (six) hours as needed for Pain.      ciclopirox (PENLAC) 8 % Soln Apply topically nightly. 6.6 mL 3    ferrous sulfate (FEOSOL) 325 mg (65 mg iron) Tab tablet Take 325 mg by mouth once daily.      polyethylene glycol (GLYCOLAX) 17 gram/dose powder Take 17 g by mouth 2 (two) times daily. 850 g 3     [DISCONTINUED] clobetasol 0.05% (TEMOVATE) 0.05 % Oint Apply topically 2 (two) times daily. 45 g 1    [DISCONTINUED] diclofenac sodium (VOLTAREN) 1 % Gel Apply 2 g topically once daily. (Patient taking differently: Apply 2 g topically as needed.) 100 g prn    [DISCONTINUED] ketoconazole (NIZORAL) 2 % shampoo Apply topically twice a week. (Patient taking differently: Apply 1 application topically twice a week.) 120 mL 2    [DISCONTINUED] meclizine (ANTIVERT) 25 mg tablet Take 1 tablet (25 mg total) by mouth 3 (three) times daily as needed. 20 tablet prn    [DISCONTINUED] nitroGLYCERIN (NITROSTAT) 0.4 MG SL tablet Place 1 tablet (0.4 mg total) under the tongue every 5 (five) minutes as needed for Chest pain. As needed (Patient taking differently: Place 0.4 mg under the tongue every 5 (five) minutes as needed for Chest pain. Seek medical help if pain is not relieved after the third dose.) 30 tablet 3    [DISCONTINUED] nystatin (MYCOSTATIN) powder Apply topically 4 (four) times daily. 60 g 1       SCHEDULED MEDS:   ascorbic acid (vitamin C)  500 mg Oral Daily    aspirin  81 mg Oral Daily    atorvastatin  40 mg Oral Daily    diltiaZEM  180 mg Oral Daily    docusate sodium  100 mg Oral Daily    enoxparin  1 mg/kg Subcutaneous Q24H    isosorbide mononitrate  30 mg Oral Daily    lactulose  20 g Oral TID    levoFLOXacin  500 mg Intravenous Q48H    levothyroxine  88 mcg Oral Before breakfast    losartan  25 mg Oral Daily    montelukast  10 mg Oral QHS    NON FORMULARY MEDICATION 1 puff  1 puff Inhalation Daily    polyethylene glycol  17 g Oral Daily    sertraline  50 mg Oral Daily       CONTINUOUS INFUSIONS:    PRN MEDS:albuterol-ipratropium, dextrose 10%, dextrose 10%, glucagon (human recombinant), insulin aspart U-100, LORazepam, melatonin, ondansetron, polyethylene glycol, sodium chloride 0.9%, sodium chloride 0.9%    LABS AND DIAGNOSTICS     CBC LAST 3 DAYS  Recent Labs   Lab 10/18/22  0417 10/19/22  8794 10/20/22  4956  10/22/22  0421   WBC 11.05 8.02 7.78 10.10   RBC 2.72* 2.44* 2.64* 2.71*   HGB 8.7* 7.8* 8.4* 8.6*   HCT 27.4* 24.8* 26.9* 27.5*   * 102* 102* 102*   MCH 32.0* 32.0* 31.8* 31.7*   MCHC 31.8* 31.5* 31.2* 31.3*   RDW 13.6 13.9 14.1 14.0    193 176 209   MPV 11.4 11.4 11.6 12.3   GRAN 75.4*  8.3* 71.3  5.7 72.7  5.7  --    LYMPH 13.4*  1.5 16.8*  1.4 14.4*  1.1  --    MONO 10.3  1.1* 10.7  0.9 11.2  0.9  --    BASO 0.02 0.02 0.03  --    NRBC 0 0 0  --          COAGULATION LAST 3 DAYS  No results for input(s): LABPT, INR, APTT in the last 168 hours.    CHEMISTRY LAST 3 DAYS  Recent Labs   Lab 10/17/22  0506 10/18/22  1108 10/20/22  0524 10/21/22  0440 10/22/22  0421      < > 138 140 141   K 4.4   < > 4.2 4.6 5.1      < > 106 106 105   CO2 20*   < > 25 27 26   ANIONGAP 13   < > 7* 7* 10   BUN 41*   < > 62* 62* 67*   CREATININE 1.7*   < > 2.1* 2.0* 2.0*   *   < > 127* 109 116*   CALCIUM 8.5*   < > 8.0* 8.3* 8.4*   MG 1.8  --   --   --   --    ALBUMIN 2.8*   < > 2.6* 2.5* 2.9*   PROT 5.8*   < > 5.3* 5.1* 5.9*   ALKPHOS 70   < > 66 56 67   ALT 21   < > 20 20 27   AST 29   < > 26 26 33   BILITOT 0.5   < > 0.6 0.5 0.5    < > = values in this interval not displayed.         CARDIAC PROFILE LAST 3 DAYS  Recent Labs   Lab 10/16/22  1734 10/16/22  2121 10/19/22  1745   BNP 2,653*  --  3,449*   TROPONINI 0.404* 0.389* 1.370*         ENDOCRINE LAST 3 DAYS  No results for input(s): TSH, PROCAL in the last 168 hours.    LAST ARTERIAL BLOOD GAS  ABG  No results for input(s): PH, PO2, PCO2, HCO3, BE in the last 168 hours.    LAST 7 DAYS MICROBIOLOGY   Microbiology Results (last 7 days)       Procedure Component Value Units Date/Time    Blood culture x two cultures. Draw prior to antibiotics. [[id number]] Collected: 10/16/22 1945    Order Status: Completed Specimen: Blood from Peripheral, Antecubital, Right Updated: 10/21/22 2032     Blood Culture, Routine No growth after 5 days.    Narrative:       Aerobic and anaerobic    Blood culture x two cultures. Draw prior to antibiotics. [[id number]] Collected: 10/16/22 1840    Order Status: Completed Specimen: Blood from Peripheral, Hand, Right Updated: 10/21/22 2032     Blood Culture, Routine No growth after 5 days.    Narrative:      Aerobic and anaerobic            MOST RECENT IMAGING  X-Ray Abdomen Flat And Erect  REASON: ileus    TECHNIQUE: AP abdominal radiograph.    COMPARISON: Abdominal radiograph October 20, 2022    FINDINGS:    Mild decrease of multiple loops of gas and stool distended loops of bowel when compared with previous exam. Abdominal surgical clips again noted. Blunting of the bilateral costophrenic angles again noted. Degenerative changes of the spine, pelvis and hips noted with postsurgical changes of the hips.    IMPRESSION:    Mild decrease of multiple loops of gas and stool-filled distended bowel.    Electronically signed by:  Jorje Joyce DO  10/22/2022 8:04 AM CDT Workstation: [id number]      ECHOCARDIOGRAM RESULTS (last 5)  Results for orders placed during the hospital encounter of 10/16/22    Echo    Interpretation Summary  · Moderate concentric hypertrophy and mildly decreased systolic function.  · The estimated ejection fraction is 45%.  · There are segmental left ventricular wall motion abnormalities. Anterior apical akinesia  · Moderate to severe tricuspid regurgitation.  · Moderate-to-severe mitral regurgitation.  · There is moderate to severe pulmonary hypertension.  · Severe left atrial enlargement.  · Grade II left ventricular diastolic dysfunction.  · Atrial fibrillation not observed.  · There is right ventricular hypertrophy.  · There is mild mitral stenosis. Mitral valve not well seen because of heavy mitral annular calcification      Results for orders placed during the hospital encounter of 03/21/22    Echo    Interpretation Summary  · The estimated PA systolic pressure is 43 mmHg.  · The left ventricle is normal in size  with mild concentric hypertrophy and normal systolic function.  · The estimated ejection fraction is 65%.  · Grade II left ventricular diastolic dysfunction.  · Normal right ventricular size with normal right ventricular systolic function.  · Moderate mitral regurgitation.  · There is moderate mitral stenosis.  · There is moderate mitral leaflet calcification.  · Mild tricuspid regurgitation.  · There is mild pulmonary hypertension.  · There is mild aortic valve stenosis.  · Aortic valve area is 1.77 cm2; peak velocity is 2.08 m/s; mean gradient is 11 mmHg.  · Moderate left atrial enlargement.  · Mild right atrial enlargement.      Results for orders placed during the hospital encounter of 10/19/21    Echo    Interpretation Summary  · The estimated PA systolic pressure is 44 mmHg.  · The left ventricle is normal in size with severe concentric hypertrophy and normal systolic function.  · The estimated ejection fraction is 60%.  · Grade II left ventricular diastolic dysfunction.  · Mild right ventricular enlargement.  · Mild left atrial enlargement.  · Mild to moderate tricuspid regurgitation.  · Mild-to-moderate mitral regurgitation.  · Normal central venous pressure (3 mmHg).      Results for orders placed during the hospital encounter of 05/07/20    Echo Color Flow Doppler? Yes; Bubble Contrast? Yes    Interpretation Summary  · Concentric left ventricular hypertrophy.  · The mean diastolic gradient across the mitral valve is 5 mmHg at a heart rate of 57 bpm.  · Normal left ventricular systolic function. The estimated ejection fraction is 65%.  · Grade II (moderate) left ventricular diastolic dysfunction consistent with pseudonormalization.  · Normal right ventricular systolic function.  · Severe left atrial enlargement.  · Mild right atrial enlargement.  · Mild mitral regurgitation.  · Mild to moderate tricuspid regurgitation.  · Normal central venous pressure (3 mmHg).  · The estimated PA systolic pressure is 50  mmHg.  · Pulmonary hypertension present.  · No PFO on color flow or saline bubble study      CURRENT/PREVIOUS VISIT EKG  Results for orders placed or performed during the hospital encounter of 10/16/22   EKG 12-LEAD    Collection Time: 10/16/22  6:27 PM    Narrative    Test Reason : R07.9,    Vent. Rate : 069 BPM     Atrial Rate : 069 BPM     P-R Int : 184 ms          QRS Dur : 090 ms      QT Int : 430 ms       P-R-T Axes : 052 038 -48 degrees     QTc Int : 460 ms    Normal sinus rhythm  Anteroseptal infarct (cited on or before 20-JUL-2022)  Abnormal ECG  When compared with ECG of 20-JUL-2022 17:02,  Questionable change in initial forces of Anterior leads  Non-specific change in ST segment in Anterior leads  Nonspecific T wave abnormality now evident in Inferior leads  T wave inversion now evident in Anterior leads  Confirmed by Gualberto De La Fuente MD ([id number]) on 10/19/2022 6:05:19 PM    Referred By: [email or web address]   SELF           Confirmed By:Gualberto De La Fuente MD           ASSESSMENT/PLAN:     Active Hospital Problems    Diagnosis    *Pneumonia    Acute on chronic combined systolic and diastolic heart failure       ASSESSMENT & PLAN:     Acute on Chronic CHFrEF- Improving  Pneumonia  Moderate TR  Moderate MR  Apical Hypokinesis  PH  Anemia- hgb 8.6 today   SHALINI on CKD    RECOMMENDATIONS:    Hold diuresis for now. She appears euvolemic.   Continue cardizem 180 mg po daily, imdur 30 mg po daily, and losartan 24 mg po daily.   She remains on weight based lovenox daily. Hgb is stable.   Will follow.         Domonique Ku NP  [facility]  Department of Cardiology  Date of Service: 10/22/2022      I have personally interviewed and examined the patient. I have reviewed the Nurse Practitioner's history and physical, assessment, and plan. I agree with the findings and made appropriate changes as necessary in recommendations.      Gualberto De La Fuente MD  Department of Cardiology  [facility]  10/22/2022 2:26 PM

## 2022-10-22 NOTE — PROGRESS NOTES
[facility] Medicine  Progress Note    Patient name: Blaire Cage  MRN: [id number]  Admit Date: 10/16/2022   LOS: 3 days     SUBJECTIVE:     Principal problem: Pneumonia    Interval History:  Patient is up in the chair after working with therapy.  Coarse at lung bases but good saturation son 2L NC.  She is passing gas but no BM. Denies abdominal pain.  No N/V.  Did not eat the eggs for breakfast as she does not like them but did drink the glucerna.  I added lactulose to assist with BM and am repeating Abd imaging tomorrow to evaluate constipation versus ileus.  Cardiology has added a dose of lasix at the time of this note writing.     Hospital Course:     I, Dr. Ramsey, have assumed care 10/18. Patient admitted with suspected pneumonia and started on IV abx. Patient transferred to step down unit overnight after developing respiratory distress with reports of difficulty weaning her from bipap.  Being treated for pneumonia with IV abx. Repeat CXR with unchanged appearing RLL opacity.  Her BNP is elevated at 2653 on admission which is significantly higher than her previous (278 on 3/22).  She was started on IV lasix given concern for HF. She has only put out 900 at the time of this note writing. Troponin mildly elevated but static.  Echo ordered and EF 45% with segmental LV wall motion abnormalities and anterior apical akinesia which appears much different from her 3/2022 echo. Echo also reveals mod to severe TR, mod to severe MR and mod to severe pulm HTN which appears worse from prior. Cardiology consulted and has evaluated the patient. Imdur added and recommend changing oral diltiazem to a beta blocker when lungs are improved. Bipap able to be weaned.  Supplemental oxygen able to be weaned down.  10/20 She appears to be dry. CXR improved.  Small bump in Cr and BUN and thus stopping IV lasix.  She has not had a BM for several days. She received an enema without producing a BM and had what  sounds like a near syncope event after enema. KUB ordered and reveals possible ileus of SBO. She tells me she is passing gas.     Scheduled Meds:   ascorbic acid (vitamin C)  500 mg Oral Daily    aspirin  81 mg Oral Daily    atorvastatin  40 mg Oral Daily    diltiaZEM  180 mg Oral Daily    docusate sodium  100 mg Oral Daily    enoxparin  1 mg/kg Subcutaneous Q24H    isosorbide mononitrate  30 mg Oral Daily    lactulose  20 g Oral TID    levoFLOXacin  500 mg Intravenous Q48H    levothyroxine  88 mcg Oral Before breakfast    losartan  25 mg Oral Daily    montelukast  10 mg Oral QHS    NON FORMULARY MEDICATION 1 puff  1 puff Inhalation Daily    polyethylene glycol  17 g Oral Daily    sertraline  50 mg Oral Daily     Continuous Infusions:  PRN Meds:albuterol-ipratropium, dextrose 10%, dextrose 10%, glucagon (human recombinant), insulin aspart U-100, LORazepam, melatonin, ondansetron, polyethylene glycol, sodium chloride 0.9%, sodium chloride 0.9%    Review of patient's allergies indicates:   Allergen Reactions    Carvedilol Other (See Comments)     Bradycardia and syncope    Boniva [ibandronate]     Codeine     Hydralazine analogues     Iodinated contrast media Hives    Kionex [sodium polystyrene sulfonate] Diarrhea     From encounter 12/11/17 for diarrhea--not true allergy.    Lisinopril     Morphine        Review of Systems: As per interval history    OBJECTIVE:     Vital Signs (Most Recent)  Temp: 98.3 °F (36.8 °C) (10/21/22 1524)  Pulse: 65 (10/21/22 1638)  Resp: 15 (10/21/22 1638)  BP: (!) 114/54 (10/21/22 1524)  SpO2: 98 % (10/21/22 1638)    Vital Signs Range (Last 24H):  Temp:  [97.9 °F (36.6 °C)-98.3 °F (36.8 °C)]   Pulse:  [62-72]   Resp:  [15-20]   BP: ()/(38-65)   SpO2:  [95 %-100 %]     I & O (Last 24H):  Intake/Output Summary (Last 24 hours) at 10/21/2022 1919  Last data filed at 10/21/2022 1529  Gross per 24 hour   Intake 720 ml   Output 350 ml   Net 370 ml         Physical Exam:    Vitals and  nursing note reviewed.     Constitutional:       General: Not in acute distress.     Appearance: Well-developed.   HENT:      Head: Normocephalic and atraumatic.   Eyes:      Pupils: Pupils are equal, round, and reactive to light.   Cardiovascular:      Rate and Rhythm: Regular rhythm.   No LE edema  Pulmonary:      Effort: Pulmonary effort is normal.      Breath sounds: Normal breath sounds. No wheezing.   O2 per NC  Abdominal:      General: There is no distension.      Palpations: Abdomen is full     Tenderness: There is no abdominal tenderness. There is no guarding or rebound.   Musculoskeletal:         General: Normal range of motion.      Cervical back: Normal range of motion.   Skin:     Findings: No rash.   Neurological:      Mental Status: Alert and oriented to person, place, and time.      Cranial Nerves: No cranial nerve deficit.      Sensory: No sensory deficit.     Laboratory:  I have reviewed all pertinent lab results within the past 24 hours.  CBC:   Recent Labs   Lab 10/20/22  0524   WBC 7.78   RBC 2.64*   HGB 8.4*   HCT 26.9*      *   MCH 31.8*   MCHC 31.2*       CMP:   Recent Labs   Lab 10/21/22  0440      CALCIUM 8.3*   ALBUMIN 2.5*   PROT 5.1*      K 4.6   CO2 27      BUN 62*   CREATININE 2.0*   ALKPHOS 56   ALT 20   AST 26   BILITOT 0.5       Cardiac markers:   Recent Labs   Lab 10/19/22  1745   TROPONINI 1.370*       Microbiology Results (last 7 days)       Procedure Component Value Units Date/Time    Blood culture x two cultures. Draw prior to antibiotics. [[id number]] Collected: 10/16/22 1945    Order Status: Completed Specimen: Blood from Peripheral, Antecubital, Right Updated: 10/20/22 2032     Blood Culture, Routine No Growth to date      No Growth to date      No Growth to date      No Growth to date      No Growth to date    Narrative:      Aerobic and anaerobic    Blood culture x two cultures. Draw prior to antibiotics. 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         Plan:     -New ileus versus bowel obstruction versus constipation.  Colace and Miralax ordered.  Lactulose ordered. Will follow.  NGT if starts to vomit. She needs to have a bowel movement and ileus/obstruction be definitively ruled out prior to discharge.   -empiric tx with IV abx as CXR appears more pneumonia than pulmonary edema given the asymetry. I agree with  pulmonary and will commit patient to five day course of abx- should be done today.   -Elevated BNP and new echo findings are more convincing of acute HF, however.  Started IV lasix and Cardiology consulted. Renal function climbing and thus I have put lasix on hold as CXR is now improved and no volume overload on exam.   -Imdur added per Cardiology  -BNP and troponin repeated and worse though volume and respiratory state is better  -CXR repeated and actually a little better  -PE felt to be unlikely. Will transition to tx dose lovenox  -Strict ins and outs  -Tele monitoring  -Attempting to wean her off supplemental oxygen during the day per her baseline.  -PT/OT  -HH ordered for discharge      VTE Risk Mitigation (From admission, onward)           Ordered     enoxaparin injection 60 mg  Every 24 hours (non-standard times)         10/18/22 1102     IP VTE LOW RISK PATIENT  Once         10/16/22 2046     Place sequential compression device  Until discontinued          10/16/22 2046                      Department Hospital Medicine  [facility]  Joey Ramsey MD  Date of service: 10/21/2022

## 2022-10-23 NOTE — CARE UPDATE
10/23/22 0731   Patient Assessment/Suction   Level of Consciousness (AVPU) alert   All Lung Fields Breath Sounds clear;diminished   Skin Integrity   $ Wound Care Tech Time 15 min   Area Observed Bridge of nose   Skin Appearance without discoloration   PRE-TX-O2   O2 Device (Oxygen Therapy) nasal cannula   $ Is the patient on Low Flow Oxygen? Yes   Flow (L/min) 2   SpO2 (!) 94 %   Pulse Oximetry Type Intermittent   $ Pulse Oximetry - Multiple Charge Pulse Oximetry - Multiple   Pulse 67   Resp 15   Aerosol Therapy   $ Aerosol Therapy Charges Aerosol Treatment   Daily Review of Necessity (SVN) completed   Respiratory Treatment Status (SVN) given   Treatment Route (SVN) mouthpiece   Patient Position (SVN) semi-Shetty's   Post Treatment Assessment (SVN) increased aeration   Signs of Intolerance (SVN) none   Breath Sounds Post-Respiratory Treatment   Throughout All Fields Post-Treatment aeration increased   Post-treatment Heart Rate (beats/min) 63   Post-treatment Resp Rate (breaths/min) 15   Preset CPAP/BiPAP Settings   $ CPAP/BiPAP Daily Charge BiPAP/CPAP Daily   Education   $ Education Bronchodilator;15 min   Respiratory Evaluation   $ Care Plan Tech Time 15 min

## 2022-10-23 NOTE — CARE UPDATE
10/22/22 2106   Patient Assessment/Suction   Level of Consciousness (AVPU) alert   Respiratory Effort Mild;Labored   Expansion/Accessory Muscles/Retractions no use of accessory muscles   All Lung Fields Breath Sounds wheezes, expiratory   Rhythm/Pattern, Respiratory shortness of breath   Cough Frequency infrequent   Cough Type no productive sputum   PRE-TX-O2   O2 Device (Oxygen Therapy) nasal cannula   $ Is the patient on Low Flow Oxygen? Yes   Flow (L/min) 2   SpO2 95 %   Pulse Oximetry Type Intermittent   $ Pulse Oximetry - Multiple Charge Pulse Oximetry - Multiple   Pulse 80   Resp (!) 9   Positioning   Head of Bed (HOB) Positioning HOB elevated;HOB at 30 degrees   Aerosol Therapy   $ Aerosol Therapy Charges Aerosol Treatment   Daily Review of Necessity (SVN) completed   Respiratory Treatment Status (SVN) given   Treatment Route (SVN) mask   Patient Position (SVN) semi-Shetty's   Post Treatment Assessment (SVN) breath sounds improved   Signs of Intolerance (SVN) none   Breath Sounds Post-Respiratory Treatment   Throughout All Fields Post-Treatment All Fields   Throughout All Fields Post-Treatment aeration increased   Post-treatment Heart Rate (beats/min) 85   Post-treatment Resp Rate (breaths/min) 17   Education   $ Education Bronchodilator;15 min   Respiratory Evaluation   $ Care Plan Tech Time 15 min

## 2022-10-23 NOTE — CARE UPDATE
10/22/22 2106   Patient Assessment/Suction   Level of Consciousness (AVPU) alert   Respiratory Effort Mild;Labored   Expansion/Accessory Muscles/Retractions no use of accessory muscles   All Lung Fields Breath Sounds wheezes, expiratory   Rhythm/Pattern, Respiratory shortness of breath   Cough Frequency infrequent   Cough Type no productive sputum   PRE-TX-O2   O2 Device (Oxygen Therapy) nasal cannula   $ Is the patient on Low Flow Oxygen? Yes   Flow (L/min) 2   SpO2 95 %   Pulse Oximetry Type Intermittent   $ Pulse Oximetry - Multiple Charge Pulse Oximetry - Multiple   Pulse 80   Resp (!) 9   Positioning   Head of Bed (HOB) Positioning HOB elevated;HOB at 30 degrees   Aerosol Therapy   $ Aerosol Therapy Charges Aerosol Treatment   Daily Review of Necessity (SVN) completed   Respiratory Treatment Status (SVN) given   Treatment Route (SVN) mask   Patient Position (SVN) semi-Shetty's   Post Treatment Assessment (SVN) breath sounds improved   Signs of Intolerance (SVN) none   Breath Sounds Post-Respiratory Treatment   Throughout All Fields Post-Treatment All Fields   Throughout All Fields Post-Treatment aeration increased   Post-treatment Heart Rate (beats/min) 85   Post-treatment Resp Rate (breaths/min) 17   Preset CPAP/BiPAP Settings   Mode Of Delivery BiPAP S/T   $ Is patient using? Yes   Size of Mask Small   Sized Appropriately? Yes   Equipment Type V60   Airway Device Type small full face mask   Humidifier not applicable   Ipap 10   EPAP (cm H2O) 5   Pressure Support (cm H2O) 5   Set Rate (Breaths/Min) 16   ITime (sec) 0.9   Rise Time (sec) 3   Patient CPAP/BiPAP Settings   FiO2 Auto Set yes   RR Total (Breaths/Min) 22   Tidal Volume (mL) 415   VE Minute Ventilation (L/min) 9.5 L/min   Peak Inspiratory Pressure (cm H2O) 15   TiTOT (%) 33   Total Leak (L/Min) 10   Patient Trigger - ST Mode Only (%) 95   Education   $ Education Bronchodilator;15 min   Respiratory Evaluation   $ Care Plan Tech Time 15 min

## 2022-10-23 NOTE — PLAN OF CARE
Problem: Adult Inpatient Plan of Care  Goal: Plan of Care Review  Outcome: Ongoing, Progressing  Goal: Patient-Specific Goal (Individualized)  Outcome: Ongoing, Progressing  Goal: Absence of Hospital-Acquired Illness or Injury  Outcome: Ongoing, Progressing  Goal: Optimal Comfort and Wellbeing  Outcome: Ongoing, Progressing  Goal: Readiness for Transition of Care  Outcome: Ongoing, Progressing     Problem: Diabetes Comorbidity  Goal: Blood Glucose Level Within Targeted Range  Outcome: Ongoing, Progressing     Problem: Fluid and Electrolyte Imbalance (Acute Kidney Injury/Impairment)  Goal: Fluid and Electrolyte Balance  Outcome: Ongoing, Progressing     Problem: Oral Intake Inadequate (Acute Kidney Injury/Impairment)  Goal: Optimal Nutrition Intake  Outcome: Ongoing, Progressing     Problem: Renal Function Impairment (Acute Kidney Injury/Impairment)  Goal: Effective Renal Function  Outcome: Ongoing, Progressing     Problem: Fluid Imbalance (Pneumonia)  Goal: Fluid Balance  Outcome: Ongoing, Progressing     Problem: Infection (Pneumonia)  Goal: Resolution of Infection Signs and Symptoms  Outcome: Ongoing, Progressing     Problem: Respiratory Compromise (Pneumonia)  Goal: Effective Oxygenation and Ventilation  Outcome: Ongoing, Progressing     Problem: Fall Injury Risk  Goal: Absence of Fall and Fall-Related Injury  Outcome: Ongoing, Progressing     Problem: Skin Injury Risk Increased  Goal: Skin Health and Integrity  Outcome: Ongoing, Progressing      Patient Education        Hemoglobin A1c: About This Test  What is it? Hemoglobin A1c is a blood test that checks your average blood sugar level over the past 2 to 3 months. This test also is called a glycohemoglobin test or an A1c test.  Why is this test done? The A1c test is done to check how well your diabetes has been controlled over the past 2 to 3 months. Your doctor can use this information to adjust your medicine and diabetes treatment, if needed. This test is also one of the tests used to diagnose diabetes in adults. How can you prepare for the test?  You don't need to stop eating before you have an A1c test. This test can be done at any time during the day, even after a meal.  What happens during the test?  The health professional taking a sample of your blood will:  · Wrap an elastic band around your upper arm. This makes the veins below the band larger so it is easier to put a needle into the vein. · Clean the needle site with alcohol. · Put the needle into the vein. · Attach a tube to the needle to fill it with blood. · Remove the band from your arm when enough blood is collected. · Put a gauze pad or cotton ball over the needle site as the needle is removed. · Put pressure on the site and then put on a bandage. What else should you know about the test?  The test result is usually given as a percentage. The normal A1c is less than 5.7%. You have a higher risk for diabetes if your A1c is 5.7% to 6.4%. If your level is 6.5% or higher, you have diabetes. The A1c test result also can be used to find your estimated average glucose, or eAG. Your eAG and A1c show the same thing in two different ways. They both help you learn more about your average blood sugar range over the past 2 to 3 months. A1c is shown as a percentage, while eAG uses the same units (mg/dl) as your glucose meter.   Examples:  · 6% A1c = 126 mg/dL  · 7% A1c = 154 mg/dL  · 8% A1c = 183 mg/dL  · 9% A1c = 212 mg/dL  · 10% A1c = 240 mg/dL  · 11% A1c = 269 mg/dL  · 12% A1c = 298 mg/dL  Where can you learn more? Go to [email or web address] and sign in to your [employer] account. Enter U216 in the [employer] box to learn more about \"Hemoglobin A1c: About This Test.\"     If you do not have an account, please click on the \"Sign Up Now\" link. Current as of: July 25, 2018  Content Version: 11.9  © 5609-8263 [employer]. Care instructions adapted under license by [facility] ([facility]). If you have questions about a medical condition or this instruction, always ask your healthcare professional. [address] any warranty or liability for your use of this information.

## 2022-10-23 NOTE — PROGRESS NOTES
[facility]  Department of Cardiology  Consult Note      PATIENT NAME: Blaire Cage  MRN: [id number]  TODAY'S DATE: 10/23/2022  ADMIT DATE: 10/16/2022                          CONSULT REQUESTED BY: Joey Ramsey MD    SUBJECTIVE     PRINCIPAL PROBLEM: Pneumonia    10/23/22:  Patient seen resting in bed with SOB noted. She is wheezing as well. Chest xray does show some pleural fluid.     10/22/22:  Patient seen resting in bed. She states her breathing is stable.     10//20/22    Patient lying in bed in no acute distress. Feels better. NO SOB. On room air doing well. Denies CP.         REASON FOR CONSULT:  Abnormal ECHO      HPI:    89 year old female patient known to myself and SARA De La Fuente. She has been having SOB, fatigue fever and cough and came to ER for evaluation was diagnosed with pneumonia  ECHO shows apical hypokinesis and we have been consulted for the same. She denies CP or Palpitations.         Patient information was obtained from patient, past medical records and ER records.   HISTORY OF PRESENT ILLNESS:   Blaire Cage is a 89 y.o. old female who  has a past medical history of Anemia due to multiple mechanisms (6/29/2018), Anemia, chronic disease (6/29/2018), Anemia, chronic renal failure, stage 3 (moderate) (6/29/2018), Anticoagulant long-term use, Aortic aneurysm, Chest pain, CHF (congestive heart failure), COPD (chronic obstructive pulmonary disease), COPD with acute exacerbation (1/9/2015), Depression, Diabetes mellitus type II, DVT (deep venous thrombosis) (06/09/2018), Encounter for blood transfusion, GERD (gastroesophageal reflux disease), GI bleed, Gout, 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 (8/7/2018), Hip arthritis (3/1/2016), Homocysteinemia (8/7/2018), Hyperlipidemia, Hypertension, Incontinent of urine, Normocytic normochromic anemia (6/29/2018), Oxygen dependent, PE (pulmonary thromboembolism) (06/09/2018), Pneumonia of right lower lobe due to infectious organism  (9/11/2017), Syncope, Thyroid disease, Type 2 diabetes mellitus with stage 3 chronic kidney disease (1/18/2013), and UTI (urinary tract infection).. The [facility] with a primary complaint of Shortness of Breath and Weakness (Pt reports to ED with weakness/SOB starting last night. Pt has a hx of COPD(as needed/at night). Pt in nad at triage, vss, in wheelchair.)  .      Very pleasant 89-year-old female presents to the emergency room with shortness of breath.  The patient states she has been having shortness of breath and weakness for the past 2 days.  Patient states last night shortness of breath got progressively worse.  It she also endorses generalized weakness a cough and fatigue.  She describes her cough as productive in nature and yellow in color.  She denies fever chills hematemesis hemoptysis lightheadedness dizziness or syncope.       The patient does use oxygen at home at night she is on CPAP.  She has been using her oxygen more secondary to dyspnea with exertion.  She denied actual chest pain lightheadedness dizziness palpitations or syncope     She describes her symptoms as moderate to severe severity with no exacerbating or alleviating factors        Past medical history is significant for chronic kidney disease stage 3, hypertension, aortic aneurysm, CHF, COPD, type 2 diabetes, depression, arthritis, hyperlipidemia, normocytic anemia, oxygen dependent, pulmonary in emboli on 06/09/2018, thyroid disease.     In the emergency room she was found to have pneumonia and was started with a broad-spectrum antibiotic and DuoNeb treatments.  She was also found to have a component of heart failure and given IV Lasix.  We will monitor her output and intake closely and consult her cardiologist for further recommendations  REVIEW OF SYSTEMS:     Review of Systems     HENT: Negative.     Eyes: Negative.    Respiratory: + SOB  Cardiovascular:  negative for leg  swelling  Gastrointestinal: Negative.  + constipation  Genitourinary: Negative.    Musculoskeletal: Negative.    Skin: Negative.    Neurological:  Positive for weakness.   Endo/Heme/Allergies: Negative.    Psychiatric/Behavioral: Negative.           Review of patient's allergies indicates:   Allergen Reactions    Carvedilol Other (See Comments)     Bradycardia and syncope    Boniva [ibandronate]     Codeine     Hydralazine analogues     Iodinated contrast media Hives    Kionex [sodium polystyrene sulfonate] Diarrhea     From encounter 12/11/17 for diarrhea--not true allergy.    Lisinopril     Morphine        Past Medical History:   Diagnosis Date    Anemia due to multiple mechanisms 6/29/2018    Anemia, chronic disease 6/29/2018    Anemia, chronic renal failure, stage 3 (moderate) 6/29/2018    Anticoagulant long-term use     aspirin    Aortic aneurysm     Chest pain     CHF (congestive heart failure)     COPD (chronic obstructive pulmonary disease)     COPD with acute exacerbation 1/9/2015    Depression     Diabetes mellitus type II     no longer on any DM meds    DVT (deep venous thrombosis) 06/09/2018    Encounter for blood transfusion     GERD (gastroesophageal reflux disease)     GI bleed     Gout     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 8/7/2018    Hip arthritis 3/1/2016    Homocysteinemia 8/7/2018    Hyperlipidemia     Hypertension     Incontinent of urine     Normocytic normochromic anemia 6/29/2018    Oxygen dependent     use oxygen as needed    PE (pulmonary thromboembolism) 06/09/2018    Pneumonia of right lower lobe due to infectious organism 9/11/2017    Syncope     Thyroid disease     hypothyroid    Type 2 diabetes mellitus with stage 3 chronic kidney disease 1/18/2013    UTI (urinary tract infection)      Past Surgical History:   Procedure Laterality Date    APPENDECTOMY      CHOLECYSTECTOMY      COLONOSCOPY Left 10/29/2020    Procedure: COLONOSCOPY;  Surgeon: Singh Kee III, MD;  Location: [facility]  ENDO;  Service: Endoscopy;  Laterality: Left;    ESOPHAGOGASTRODUODENOSCOPY Left 10/26/2020    Procedure: EGD (ESOPHAGOGASTRODUODENOSCOPY);  Surgeon: Kareem Gramajo MD;  Location: [facility] ENDO;  Service: Endoscopy;  Laterality: Left;    ESOPHAGOGASTRODUODENOSCOPY Left 10/28/2020    Procedure: EGD (ESOPHAGOGASTRODUODENOSCOPY);  Surgeon: Kareem Gramajo MD;  Location: [facility] ENDO;  Service: Endoscopy;  Laterality: Left;    ESOPHAGOGASTRODUODENOSCOPY N/A 9/16/2021    Procedure: EGD (ESOPHAGOGASTRODUODENOSCOPY);  Surgeon: Kareem Gramajo MD;  Location: [facility] ENDO;  Service: Endoscopy;  Laterality: N/A;    FRACTURE SURGERY      right hip     HERNIA REPAIR      groin    HYSTERECTOMY      INSERTION OF IMPLANTABLE LOOP RECORDER N/A 12/12/2018    Procedure: Insertion, Implantable Loop Recorder;  Surgeon: Ahsok Herbert MD;  Location: [facility] CATH LAB;  Service: Cardiology;  Laterality: N/A;    PERCUTANEOUS PINNING OF HIP Left 3/23/2019    Procedure: PINNING, HIP, PERCUTANEOUS;  Surgeon: Jorje Hamlin MD;  Location: [facility] OR;  Service: Orthopedics;  Laterality: Left;    SMALL BOWEL ENTEROSCOPY N/A 10/29/2020    Procedure: ENTEROSCOPY;  Surgeon: Singh Kee III, MD;  Location: [facility];  Service: Endoscopy;  Laterality: N/A;    VARICOSE VEIN SURGERY       Social History     Tobacco Use    Smoking status: Former     Packs/day: 0.35     Years: 44.00     Pack years: 15.40     Types: Cigarettes     Start date: 1970    Smokeless tobacco: Never    Tobacco comments:     1/08/2015   Substance Use Topics    Alcohol use: No     Alcohol/week: 0.0 standard drinks    Drug use: No        OBJECTIVE     VITAL SIGNS (Most Recent)  Temp: 97.6 °F (36.4 °C) (10/23/22 1535)  Pulse: 86 (10/23/22 1535)  Resp: 18 (10/23/22 1535)  BP: 139/66 (10/23/22 1535)  SpO2: 97 % (10/23/22 1535)    VENTILATION STATUS  Resp: 18 (10/23/22 1535)  SpO2: 97 % (10/23/22 1535)       I & O (Last 24H):  Intake/Output Summary (Last 24 hours) at 10/23/2022  1554  Last data filed at 10/23/2022 0830  Gross per 24 hour   Intake 980 ml   Output 750 ml   Net 230 ml         WEIGHTS  Wt Readings from Last 3 Encounters:   10/23/22 0320 55.8 kg (123 lb 0.3 oz)   10/22/22 0345 57.6 kg (126 lb 15.8 oz)   10/21/22 0350 57.5 kg (126 lb 12.2 oz)   10/16/22 2233 56.5 kg (124 lb 9 oz)   10/16/22 1621 57.6 kg (127 lb)   10/18/22 1200 56.2 kg (124 lb)   10/12/22 1606 57 kg (125 lb 9.6 oz)       PHYSICAL EXAM  GENERAL: well built, well nourished, well-developed in no apparent distress alert and oriented.   HEENT: Normocephalic. Pupils normal and conjunctivae normal.  Mucous membranes normal, no cyanosis or icterus, trachea central,no pallor or icterus is noted..   NECK: No JVD. No bruit..   CARDIAC: systolic murmur noted  CHEST ANATOMY: normal.   LUNGS: wheezing  ABDOMEN: Soft no masses or organomegaly.  No abdomen pulsations or bruits.  Normal bowel sounds. No pulsations and no masses felt, No guarding or rebound.   URINARY: No carter catheter   EXTREMITIES: No cyanosis, clubbing or edema noted at this time., no calf tenderness bilaterally.   PERIPHERAL VASCULAR SYSTEM: Good palpable distal pulses.   CENTRAL NERVOUS SYSTEM: No focal motor or sensory deficits noted.   SKIN: Skin without lesions, moist, well perfused.   MUSCLE STRENGTH & TONE: No noteable weakness, atrophy or abnormal movement.     HOME MEDICATIONS:  No current facility-administered medications on file prior to encounter.     Current Outpatient Medications on File Prior to Encounter   Medication Sig Dispense Refill    albuterol (PROVENTIL) 2.5 mg /3 mL (0.083 %) nebulizer solution Take 3 mLs (2.5 mg total) by nebulization every 6 (six) hours as needed for Wheezing or Shortness of Breath. USE 1 VIAL IN NEBULIZER EVERY 6 HOURS AS NEEDED FOR WHEEZING. RESCUE 360 each 3    allopurinoL (ZYLOPRIM) 100 MG tablet Take 1 tablet (100 mg total) by mouth once daily. 90 tablet 3    ascorbic acid, vitamin C, (VITAMIN C) 500 MG tablet  Take 500 mg by mouth once daily.      calcium carbonate (OS-TASIA) 500 mg calcium (1,250 mg) tablet Take 1 tablet by mouth 2 (two) times daily.      diltiaZEM (CARDIZEM CD) 180 MG 24 hr capsule Take 1 capsule (180 mg total) by mouth once daily. 90 capsule 3    fish oil-omega-3 fatty acids 300-1,000 mg capsule Take 2 g by mouth every evening.      fluticasone-umeclidin-vilanter (TRELEGY ELLIPTA) 200-62.5-25 mcg inhaler Inhale 1 puff into the lungs once daily. 60 each 11    folic acid-vit B6-vit B12 2.5-25-2 mg (FOLBIC) 2.5-25-2 mg Tab Take 1 tablet by mouth once daily.      furosemide (LASIX) 20 MG tablet Take 1 tablet only as needed, for swelling, increase SOB, weight gain of 3 lbs overnight or 5 lbs for the week 30 tablet prn    levothyroxine (SYNTHROID) 88 MCG tablet Take 1 tablet (88 mcg total) by mouth once daily. 90 tablet 3    losartan (COZAAR) 25 MG tablet Take 1 tablet (25 mg total) by mouth once daily. (Patient taking differently: Take 25 mg by mouth once daily. prn) 90 tablet 3    mepolizumab 100 mg/mL AtIn Inject 1 mL (100 mg total) into the skin every 28 days. 1 mL 12    montelukast (SINGULAIR) 10 mg tablet TAKE 1 TABLET BY MOUTH ONCE DAILY IN THE EVENING (Patient taking differently: Take 10 mg by mouth every evening.) 90 tablet 3    multivitamin (THERAGRAN) tablet Take 1 tablet by mouth once daily.      omeprazole (PRILOSEC) 40 MG capsule Take 1 capsule (40 mg total) by mouth once daily. 90 capsule 3    rosuvastatin (CRESTOR) 20 MG tablet Take 1 tablet (20 mg total) by mouth once daily. 90 tablet 3    sertraline (ZOLOFT) 50 MG tablet Take 1 tablet (50 mg total) by mouth once daily. 90 tablet 1    simethicone 180 mg Cap Take 1 capsule by mouth 3 (three) times daily as needed.      acetaminophen (TYLENOL) 325 MG tablet Take 325 mg by mouth every 6 (six) hours as needed for Pain.      ciclopirox (PENLAC) 8 % Soln Apply topically nightly. 6.6 mL 3    ferrous sulfate (FEOSOL) 325 mg (65 mg iron) Tab tablet  Take 325 mg by mouth once daily.      polyethylene glycol (GLYCOLAX) 17 gram/dose powder Take 17 g by mouth 2 (two) times daily. 850 g 3    [DISCONTINUED] clobetasol 0.05% (TEMOVATE) 0.05 % Oint Apply topically 2 (two) times daily. 45 g 1    [DISCONTINUED] diclofenac sodium (VOLTAREN) 1 % Gel Apply 2 g topically once daily. (Patient taking differently: Apply 2 g topically as needed.) 100 g prn    [DISCONTINUED] ketoconazole (NIZORAL) 2 % shampoo Apply topically twice a week. (Patient taking differently: Apply 1 application topically twice a week.) 120 mL 2    [DISCONTINUED] meclizine (ANTIVERT) 25 mg tablet Take 1 tablet (25 mg total) by mouth 3 (three) times daily as needed. 20 tablet prn    [DISCONTINUED] nitroGLYCERIN (NITROSTAT) 0.4 MG SL tablet Place 1 tablet (0.4 mg total) under the tongue every 5 (five) minutes as needed for Chest pain. As needed (Patient taking differently: Place 0.4 mg under the tongue every 5 (five) minutes as needed for Chest pain. Seek medical help if pain is not relieved after the third dose.) 30 tablet 3    [DISCONTINUED] nystatin (MYCOSTATIN) powder Apply topically 4 (four) times daily. 60 g 1       SCHEDULED MEDS:   ascorbic acid (vitamin C)  500 mg Oral Daily    aspirin  81 mg Oral Daily    atorvastatin  40 mg Oral Daily    diltiaZEM  180 mg Oral Daily    docusate sodium  100 mg Oral Daily    enoxparin  1 mg/kg Subcutaneous Q24H    isosorbide mononitrate  30 mg Oral Daily    lactulose  20 g Oral TID    levothyroxine  88 mcg Oral Before breakfast    losartan  25 mg Oral Daily    montelukast  10 mg Oral QHS    NON FORMULARY MEDICATION 1 puff  1 puff Inhalation Daily    polyethylene glycol  17 g Oral Daily    sertraline  50 mg Oral Daily    sodium zirconium cyclosilicate  5 g Oral Once       CONTINUOUS INFUSIONS:    PRN MEDS:albuterol-ipratropium, dextrose 10%, dextrose 10%, glucagon (human recombinant), insulin aspart U-100, LORazepam, melatonin, ondansetron, polyethylene glycol,  sodium chloride 0.9%, sodium chloride 0.9%    LABS AND DIAGNOSTICS     CBC LAST 3 DAYS  Recent Labs   Lab 10/18/22  0417 10/19/22  0405 10/20/22  0524 10/22/22  0421 10/23/22  0419   WBC 11.05 8.02 7.78 10.10 6.88   RBC 2.72* 2.44* 2.64* 2.71* 2.57*   HGB 8.7* 7.8* 8.4* 8.6* 8.4*   HCT 27.4* 24.8* 26.9* 27.5* 26.3*   * 102* 102* 102* 102*   MCH 32.0* 32.0* 31.8* 31.7* 32.7*   MCHC 31.8* 31.5* 31.2* 31.3* 31.9*   RDW 13.6 13.9 14.1 14.0 14.2    193 176 209 166   MPV 11.4 11.4 11.6 12.3 11.5   GRAN 75.4*  8.3* 71.3  5.7 72.7  5.7  --   --    LYMPH 13.4*  1.5 16.8*  1.4 14.4*  1.1  --   --    MONO 10.3  1.1* 10.7  0.9 11.2  0.9  --   --    BASO 0.02 0.02 0.03  --   --    NRBC 0 0 0  --   --          COAGULATION LAST 3 DAYS  No results for input(s): LABPT, INR, APTT in the last 168 hours.    CHEMISTRY LAST 3 DAYS  Recent Labs   Lab 10/17/22  0506 10/18/22  1108 10/21/22  0440 10/22/22  0421 10/23/22  0419      < > 140 141 145   K 4.4   < > 4.6 5.1 5.5*      < > 106 105 107   CO2 20*   < > 27 26 29   ANIONGAP 13   < > 7* 10 9   BUN 41*   < > 62* 67* 59*   CREATININE 1.7*   < > 2.0* 2.0* 1.9*   *   < > 109 116* 122*   CALCIUM 8.5*   < > 8.3* 8.4* 8.5*   MG 1.8  --   --   --   --    ALBUMIN 2.8*   < > 2.5* 2.9* 2.7*   PROT 5.8*   < > 5.1* 5.9* 5.6*   ALKPHOS 70   < > 56 67 59   ALT 21   < > 20 27 26   AST 29   < > 26 33 35   BILITOT 0.5   < > 0.5 0.5 0.5    < > = values in this interval not displayed.         CARDIAC PROFILE LAST 3 DAYS  Recent Labs   Lab 10/16/22  1734 10/16/22  2121 10/19/22  1745 10/23/22  0419   BNP 2,653*  --  3,449* >4,500*   TROPONINI 0.404* 0.389* 1.370* 0.790*         ENDOCRINE LAST 3 DAYS  No results for input(s): TSH, PROCAL in the last 168 hours.    LAST ARTERIAL BLOOD GAS  ABG  No results for input(s): PH, PO2, PCO2, HCO3, BE in the last 168 hours.    LAST 7 DAYS MICROBIOLOGY   Microbiology Results (last 7 days)       Procedure Component Value Units  Date/Time    Blood culture x two cultures. Draw prior to antibiotics. [[id number]] Collected: 10/16/22 1945    Order Status: Completed Specimen: Blood from Peripheral, Antecubital, Right Updated: 10/21/22 2032     Blood Culture, Routine No growth after 5 days.    Narrative:      Aerobic and anaerobic    Blood culture x two cultures. Draw prior to antibiotics. [[id number]] Collected: 10/16/22 1840    Order Status: Completed Specimen: Blood from Peripheral, Hand, Right Updated: 10/21/22 2032     Blood Culture, Routine No growth after 5 days.    Narrative:      Aerobic and anaerobic            MOST RECENT IMAGING  X-Ray Chest AP Portable  XR CHEST 1 VIEW    CLINICAL HISTORY:  89 years Female shortness of breath    COMPARISON: October 20, 2022    FINDINGS: Cardiac silhouette size is enlarged, similar to prior. Loop recorder projects over the left chest. Bilateral costophrenic angle blunting,, suggestive of small bilateral pleural effusions, greater on the left. Parenchymal scarring involving the right lung base. No large pleural effusion or pneumothorax. No acute osseous abnormality.    IMPRESSION:    Stable cardiomegaly with right basilar scarring and small bilateral pleural fluid.    Electronically signed by:  Harjeet Mantilla MD  10/22/2022 6:22 PM CDT Workstation: [id number]  X-Ray Abdomen Flat And Erect  REASON: ileus    TECHNIQUE: AP abdominal radiograph.    COMPARISON: Abdominal radiograph October 20, 2022    FINDINGS:    Mild decrease of multiple loops of gas and stool distended loops of bowel when compared with previous exam. Abdominal surgical clips again noted. Blunting of the bilateral costophrenic angles again noted. Degenerative changes of the spine, pelvis and hips noted with postsurgical changes of the hips.    IMPRESSION:    Mild decrease of multiple loops of gas and stool-filled distended bowel.    Electronically signed by:  Jorje Joyce DO  10/22/2022 8:04 AM CDT Workstation:  [id number]      ECHOCARDIOGRAM RESULTS (last 5)  Results for orders placed during the hospital encounter of 10/16/22    Echo    Interpretation Summary  · Moderate concentric hypertrophy and mildly decreased systolic function.  · The estimated ejection fraction is 45%.  · There are segmental left ventricular wall motion abnormalities. Anterior apical akinesia  · Moderate to severe tricuspid regurgitation.  · Moderate-to-severe mitral regurgitation.  · There is moderate to severe pulmonary hypertension.  · Severe left atrial enlargement.  · Grade II left ventricular diastolic dysfunction.  · Atrial fibrillation not observed.  · There is right ventricular hypertrophy.  · There is mild mitral stenosis. Mitral valve not well seen because of heavy mitral annular calcification      Results for orders placed during the hospital encounter of 03/21/22    Echo    Interpretation Summary  · The estimated PA systolic pressure is 43 mmHg.  · The left ventricle is normal in size with mild concentric hypertrophy and normal systolic function.  · The estimated ejection fraction is 65%.  · Grade II left ventricular diastolic dysfunction.  · Normal right ventricular size with normal right ventricular systolic function.  · Moderate mitral regurgitation.  · There is moderate mitral stenosis.  · There is moderate mitral leaflet calcification.  · Mild tricuspid regurgitation.  · There is mild pulmonary hypertension.  · There is mild aortic valve stenosis.  · Aortic valve area is 1.77 cm2; peak velocity is 2.08 m/s; mean gradient is 11 mmHg.  · Moderate left atrial enlargement.  · Mild right atrial enlargement.      Results for orders placed during the hospital encounter of 10/19/21    Echo    Interpretation Summary  · The estimated PA systolic pressure is 44 mmHg.  · The left ventricle is normal in size with severe concentric hypertrophy and normal systolic function.  · The estimated ejection fraction is 60%.  · Grade II left ventricular  diastolic dysfunction.  · Mild right ventricular enlargement.  · Mild left atrial enlargement.  · Mild to moderate tricuspid regurgitation.  · Mild-to-moderate mitral regurgitation.  · Normal central venous pressure (3 mmHg).      Results for orders placed during the hospital encounter of 05/07/20    Echo Color Flow Doppler? Yes; Bubble Contrast? Yes    Interpretation Summary  · Concentric left ventricular hypertrophy.  · The mean diastolic gradient across the mitral valve is 5 mmHg at a heart rate of 57 bpm.  · Normal left ventricular systolic function. The estimated ejection fraction is 65%.  · Grade II (moderate) left ventricular diastolic dysfunction consistent with pseudonormalization.  · Normal right ventricular systolic function.  · Severe left atrial enlargement.  · Mild right atrial enlargement.  · Mild mitral regurgitation.  · Mild to moderate tricuspid regurgitation.  · Normal central venous pressure (3 mmHg).  · The estimated PA systolic pressure is 50 mmHg.  · Pulmonary hypertension present.  · No PFO on color flow or saline bubble study      CURRENT/PREVIOUS VISIT EKG  Results for orders placed or performed during the hospital encounter of 10/16/22   EKG 12-LEAD    Collection Time: 10/16/22  6:27 PM    Narrative    Test Reason : R07.9,    Vent. Rate : 069 BPM     Atrial Rate : 069 BPM     P-R Int : 184 ms          QRS Dur : 090 ms      QT Int : 430 ms       P-R-T Axes : 052 038 -48 degrees     QTc Int : 460 ms    Normal sinus rhythm  Anteroseptal infarct (cited on or before 20-JUL-2022)  Abnormal ECG  When compared with ECG of 20-JUL-2022 17:02,  Questionable change in initial forces of Anterior leads  Non-specific change in ST segment in Anterior leads  Nonspecific T wave abnormality now evident in Inferior leads  T wave inversion now evident in Anterior leads  Confirmed by Gualberto De La Fuente MD ([id number]) on 10/19/2022 6:05:19 PM    Referred By: [email or web address]   SELF           Confirmed By:Gualberto De La Fuente MD            ASSESSMENT/PLAN:     Active Hospital Problems    Diagnosis    *Pneumonia    Acute on chronic combined systolic and diastolic heart failure       ASSESSMENT & PLAN:     Acute on Chronic CHFrEF- Improving  Pneumonia  Moderate TR  Moderate MR  Apical Hypokinesis  PH  Anemia- hgb 8.6 today   SHALINI on CKD    RECOMMENDATIONS:    Start furosemide 20 mg IV daily. Strict I&O.   Continue neb treatments.   Troponin level has trended down. Continue to treat conservatively.   Will follow closely.       Domonique Ku NP  [facility]  Department of Cardiology  Date of Service: 10/23/2022      I have personally interviewed and examined the patient. I have reviewed the Nurse Practitioner's history and physical, assessment, and plan. I agree with the findings and made appropriate changes as necessary in recommendations.      Gualberto De La Fuente MD  Department of Cardiology  [facility]  10/23/2022 2:26 PM

## 2022-10-23 NOTE — PROGRESS NOTES
Critical access hospital Medicine  Progress Note    Patient name: Blaire Cage  MRN: [id number]  Admit Date: 10/16/2022   LOS: 5 days     SUBJECTIVE:     Principal problem: Pneumonia    Interval History:   At the time of my exam, she had still not had a BM and has been getting more SOB which I think is from her mildly distended abdomen and not able to take a deep breath. She has some mild expiratory wheeze on exam today.  She is continuing to pas gas and has good bowel sounds.  She has not ambulated with staff because she is afraid due to prior falls.  BNP re checked and continues to rise. I discussed with Cardiology and IV lasix added back. Lokelma x 1 given for the hyperkalemia.  Enema given and at the time of this note writing, she had a moderate sized BM!    Hospital Course:     I, Dr. Ramesy, have assumed care 10/18. Patient admitted with suspected pneumonia and started on IV abx. Patient transferred to step down unit overnight after developing respiratory distress with reports of difficulty weaning her from bipap.  Being treated for pneumonia with IV abx. Repeat CXR with unchanged appearing RLL opacity.  Her BNP is elevated at 2653 on admission which is significantly higher than her previous (278 on 3/22).  She was started on IV lasix given concern for HF. She has only put out 900 at the time of this note writing. Troponin mildly elevated but static.  Echo ordered and EF 45% with segmental LV wall motion abnormalities and anterior apical akinesia which appears much different from her 3/2022 echo. Echo also reveals mod to severe TR, mod to severe MR and mod to severe pulm HTN which appears worse from prior. Cardiology consulted and has evaluated the patient. Imdur added and recommend changing oral diltiazem to a beta blocker when lungs are improved. Bipap able to be weaned.  Supplemental oxygen able to be weaned down.  10/20 She appears to be dry. CXR improved.  Small bump in Cr and BUN and thus  stopping IV lasix.  She has not had a BM for several days. She received an enema without producing a BM and had what sounds like a near syncope event after enema. KUB ordered and reveals possible ileus of SBO. She tells me she is passing gas. Repeat imaging 10/22 with non obstructive bowel gas pattern    Scheduled Meds:   ascorbic acid (vitamin C)  500 mg Oral Daily    aspirin  81 mg Oral Daily    atorvastatin  40 mg Oral Daily    diltiaZEM  180 mg Oral Daily    docusate sodium  100 mg Oral Daily    enoxparin  1 mg/kg Subcutaneous Q24H    furosemide (LASIX) injection  20 mg Intravenous Daily    isosorbide mononitrate  30 mg Oral Daily    lactulose  20 g Oral TID    levothyroxine  88 mcg Oral Before breakfast    losartan  25 mg Oral Daily    montelukast  10 mg Oral QHS    NON FORMULARY MEDICATION 1 puff  1 puff Inhalation Daily    polyethylene glycol  17 g Oral Daily    predniSONE  20 mg Oral Daily    sertraline  50 mg Oral Daily     Continuous Infusions:  PRN Meds:albuterol-ipratropium, dextrose 10%, dextrose 10%, LORazepam, melatonin, ondansetron, polyethylene glycol, sodium chloride 0.9%, sodium chloride 0.9%    Review of patient's allergies indicates:   Allergen Reactions    Carvedilol Other (See Comments)     Bradycardia and syncope    Boniva [ibandronate]     Codeine     Hydralazine analogues     Iodinated contrast media Hives    Kionex [sodium polystyrene sulfonate] Diarrhea     From encounter 12/11/17 for diarrhea--not true allergy.    Lisinopril     Morphine        Review of Systems: As per interval history    OBJECTIVE:     Vital Signs (Most Recent)  Temp: 97.6 °F (36.4 °C) (10/23/22 1535)  Pulse: 99 (10/23/22 1710)  Resp: 16 (10/23/22 1710)  BP: 139/66 (10/23/22 1535)  SpO2: (!) 93 % (10/23/22 1710)    Vital Signs Range (Last 24H):  Temp:  [97.2 °F (36.2 °C)-98.1 °F (36.7 °C)]   Pulse:  [67-99]   Resp:  [9-20]   BP: (116-144)/(59-66)   SpO2:  [93 %-99 %]     I & O (Last 24H):  Intake/Output Summary (Last  24 hours) at 10/23/2022 1838  Last data filed at 10/23/2022 1712  Gross per 24 hour   Intake 980 ml   Output 250 ml   Net 730 ml         Physical Exam:    Vitals and nursing note reviewed.     Constitutional:       General: Not in acute distress.     Appearance: Well-developed.   HENT:      Head: Normocephalic and atraumatic.   Eyes:      Pupils: Pupils are equal, round, and reactive to light.   Cardiovascular:      Rate and Rhythm: Regular rhythm.   No LE edema  Pulmonary:      Effort: Pulmonary effort is normal.      Breath sounds: Normal breath sounds. Mild bilateral expiratory wheeze.  Small, shallow breaths.  O2 per NC  Abdominal:      General: There is no distension.      Palpations: Abdomen is full     Tenderness: There is no abdominal tenderness. There is no guarding or rebound.   Musculoskeletal:         General: Normal range of motion.      Cervical back: Normal range of motion.   Skin:     Findings: No rash.   Neurological:      Mental Status: Alert and oriented to person, place, and time.      Cranial Nerves: No cranial nerve deficit.      Sensory: No sensory deficit.     Laboratory:  I have reviewed all pertinent lab results within the past 24 hours.  CBC:   Recent Labs   Lab 10/23/22  0419   WBC 6.88   RBC 2.57*   HGB 8.4*   HCT 26.3*      *   MCH 32.7*   MCHC 31.9*       CMP:   Recent Labs   Lab 10/23/22  0419   *   CALCIUM 8.5*   ALBUMIN 2.7*   PROT 5.6*      K 5.5*   CO2 29      BUN 59*   CREATININE 1.9*   ALKPHOS 59   ALT 26   AST 35   BILITOT 0.5       Cardiac markers:   Recent Labs   Lab 10/23/22  0419   TROPONINI 0.790*       Microbiology Results (last 7 days)       Procedure Component Value Units Date/Time    Blood culture x two cultures. Draw prior to antibiotics. [[id number]] Collected: 10/16/22 1945    Order Status: Completed Specimen: Blood from Peripheral, Antecubital, Right Updated: 10/21/22 2032     Blood Culture, Routine No growth after 5 days.     Narrative:      Aerobic and anaerobic    Blood culture x two cultures. Draw prior to antibiotics. [[id number]] Collected: 10/16/22 1840    Order Status: Completed Specimen: Blood from Peripheral, Hand, Right Updated: 10/21/22 2032     Blood Culture, Routine No growth after 5 days.    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         Plan:     -New ileus versus bowel obstruction versus constipation.  Colace, Miralax, Lactulose.  NGT if starts to vomit. She needs to have a bowel movement and ileus/obstruction be definitively ruled out prior to discharge. Abd xray somewhat improved 10/22 but still with dilated loops of bowel. Enema ordered and finally successful bowel movement 10/23.  -empiric tx with IV abx as CXR appears more pneumonia than pulmonary edema given the asymetry. I agree with  pulmonary and will commit patient to five day course of abx- completed 10/21.  -Elevated BNP and new echo findings are more convincing of acute HF, however.  Started IV lasix and Cardiology consulted. Renal function climbing and thus I have put lasix on hold as CXR is now improved and no volume overload on exam. 10/23 Restarted Lasix per cardiology as BNP continues to rise.  Monitoring renal tolerance.  -Imdur added per Cardiology  -Prednisone added for wheezing  -CXR as needed.  Has been stable with small effusions and atelectasis.   -Strict ins and outs  -Tele monitoring  -Attempting to wean her off supplemental oxygen during the day per her baseline.  -PT/OT  -HH ordered for discharge      VTE Risk Mitigation (From admission, onward)           Ordered     enoxaparin injection 60 mg  Every 24 hours (non-standard  times)         10/18/22 1102     IP VTE LOW RISK PATIENT  Once         10/16/22 2046     Place sequential compression device  Until discontinued         10/16/22 2046                      Department Hospital Medicine  [facility]  Joey Ramsey MD  Date of service: 10/23/2022

## 2022-10-23 NOTE — PROGRESS NOTES
[facility] Medicine  Progress Note    Patient name: Blaire Cage  MRN: [id number]  Admit Date: 10/16/2022   LOS: 4 days     SUBJECTIVE:     Principal problem: Pneumonia    Interval History:   She is passing gas but no BM still despite stool softener and lactulose. Denies abdominal pain.  No N/V.  Drinking glucerna without issue.  Did get in chair earlier today.  She is passing a lot of gas, she reports.  Abd xray with no obstruction but does having dilated loops of bowel- less so than prior.  I later received message from RN that she felt SOB. CXR repeated and unchanged from prior with some atelectasis and small effusions. I've encouraged ambulation and enema ordered to see if we can get bowels to move.    Hospital Course:     I, Dr. Ramsey, have assumed care 10/18. Patient admitted with suspected pneumonia and started on IV abx. Patient transferred to step down unit overnight after developing respiratory distress with reports of difficulty weaning her from bipap.  Being treated for pneumonia with IV abx. Repeat CXR with unchanged appearing RLL opacity.  Her BNP is elevated at 2653 on admission which is significantly higher than her previous (278 on 3/22).  She was started on IV lasix given concern for HF. She has only put out 900 at the time of this note writing. Troponin mildly elevated but static.  Echo ordered and EF 45% with segmental LV wall motion abnormalities and anterior apical akinesia which appears much different from her 3/2022 echo. Echo also reveals mod to severe TR, mod to severe MR and mod to severe pulm HTN which appears worse from prior. Cardiology consulted and has evaluated the patient. Imdur added and recommend changing oral diltiazem to a beta blocker when lungs are improved. Bipap able to be weaned.  Supplemental oxygen able to be weaned down.  10/20 She appears to be dry. CXR improved.  Small bump in Cr and BUN and thus stopping IV lasix.  She has not had a BM for  several days. She received an enema without producing a BM and had what sounds like a near syncope event after enema. KUB ordered and reveals possible ileus of SBO. She tells me she is passing gas.     Scheduled Meds:   ascorbic acid (vitamin C)  500 mg Oral Daily    aspirin  81 mg Oral Daily    atorvastatin  40 mg Oral Daily    diltiaZEM  180 mg Oral Daily    docusate sodium  100 mg Oral Daily    enoxparin  1 mg/kg Subcutaneous Q24H    isosorbide mononitrate  30 mg Oral Daily    lactulose  20 g Oral TID    levoFLOXacin  500 mg Intravenous Q48H    levothyroxine  88 mcg Oral Before breakfast    losartan  25 mg Oral Daily    montelukast  10 mg Oral QHS    NON FORMULARY MEDICATION 1 puff  1 puff Inhalation Daily    polyethylene glycol  17 g Oral Daily    sertraline  50 mg Oral Daily     Continuous Infusions:  PRN Meds:albuterol-ipratropium, dextrose 10%, dextrose 10%, glucagon (human recombinant), insulin aspart U-100, LORazepam, melatonin, ondansetron, polyethylene glycol, sodium chloride 0.9%, sodium chloride 0.9%    Review of patient's allergies indicates:   Allergen Reactions    Carvedilol Other (See Comments)     Bradycardia and syncope    Boniva [ibandronate]     Codeine     Hydralazine analogues     Iodinated contrast media Hives    Kionex [sodium polystyrene sulfonate] Diarrhea     From encounter 12/11/17 for diarrhea--not true allergy.    Lisinopril     Morphine        Review of Systems: As per interval history    OBJECTIVE:     Vital Signs (Most Recent)  Temp: 98.8 °F (37.1 °C) (10/22/22 1555)  Pulse: 80 (10/22/22 1708)  Resp: 15 (10/22/22 1708)  BP: 131/63 (10/22/22 1555)  SpO2: 98 % (10/22/22 1708)    Vital Signs Range (Last 24H):  Temp:  [97 °F (36.1 °C)-98.8 °F (37.1 °C)]   Pulse:  [68-85]   Resp:  [15-22]   BP: (106-134)/(53-63)   SpO2:  [93 %-99 %]     I & O (Last 24H):  Intake/Output Summary (Last 24 hours) at 10/22/2022 1912  Last data filed at 10/22/2022 1646  Gross per 24 hour   Intake 840 ml    Output 500 ml   Net 340 ml         Physical Exam:    Vitals and nursing note reviewed.     Constitutional:       General: Not in acute distress.     Appearance: Well-developed.   HENT:      Head: Normocephalic and atraumatic.   Eyes:      Pupils: Pupils are equal, round, and reactive to light.   Cardiovascular:      Rate and Rhythm: Regular rhythm.   No LE edema  Pulmonary:      Effort: Pulmonary effort is normal.      Breath sounds: Normal breath sounds. No wheezing.   O2 per NC  Abdominal:      General: There is no distension.      Palpations: Abdomen is full     Tenderness: There is no abdominal tenderness. There is no guarding or rebound.   Musculoskeletal:         General: Normal range of motion.      Cervical back: Normal range of motion.   Skin:     Findings: No rash.   Neurological:      Mental Status: Alert and oriented to person, place, and time.      Cranial Nerves: No cranial nerve deficit.      Sensory: No sensory deficit.     Laboratory:  I have reviewed all pertinent lab results within the past 24 hours.  CBC:   Recent Labs   Lab 10/22/22  0421   WBC 10.10   RBC 2.71*   HGB 8.6*   HCT 27.5*      *   MCH 31.7*   MCHC 31.3*       CMP:   Recent Labs   Lab 10/22/22  0421   *   CALCIUM 8.4*   ALBUMIN 2.9*   PROT 5.9*      K 5.1   CO2 26      BUN 67*   CREATININE 2.0*   ALKPHOS 67   ALT 27   AST 33   BILITOT 0.5       Cardiac markers:   Recent Labs   Lab 10/19/22  1745   TROPONINI 1.370*       Microbiology Results (last 7 days)       Procedure Component Value Units Date/Time    Blood culture x two cultures. Draw prior to antibiotics. [[id number]] Collected: 10/16/22 1945    Order Status: Completed Specimen: Blood from Peripheral, Antecubital, Right Updated: 10/21/22 2032     Blood Culture, Routine No growth after 5 days.    Narrative:      Aerobic and anaerobic    Blood culture x two cultures. Draw prior to antibiotics. [[id number]] Collected: 10/16/22 1840    Order Status:  Completed Specimen: Blood from Peripheral, Hand, Right Updated: 10/21/22 2032     Blood Culture, Routine No growth after 5 days.    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         Plan:     -New ileus versus bowel obstruction versus constipation.  Colace, Miralax, Lactulose.  NGT if starts to vomit. She needs to have a bowel movement and ileus/obstruction be definitively ruled out prior to discharge. Abd xray somewhat improved 10/22 but still with dilated loops of bowel. Enema ordered.   -empiric tx with IV abx as CXR appears more pneumonia than pulmonary edema given the asymetry. I agree with  pulmonary and will commit patient to five day course of abx- completed 10/21.  -Elevated BNP and new echo findings are more convincing of acute HF, however.  Started IV lasix and Cardiology consulted. Renal function climbing and thus I have put lasix on hold as CXR is now improved and no volume overload on exam.   -Imdur added per Cardiology  -BNP and troponin repeated and worse though volume and respiratory state is better  -CXR as needed.  Has been stable with small effusions and atelectasis.   -Strict ins and outs  -Tele monitoring  -Attempting to wean her off supplemental oxygen during the day per her baseline.  -PT/OT  -HH ordered for discharge      VTE Risk Mitigation (From admission, onward)           Ordered     enoxaparin injection 60 mg  Every 24 hours (non-standard times)         10/18/22 1102     IP VTE LOW RISK PATIENT  Once         10/16/22 2046     Place sequential compression device  Until discontinued         10/16/22 2046                      Department Hospital Medicine  [facility]  [facility] SARIAH Ramsey MD  Date of service: 10/22/2022

## 2022-10-23 NOTE — NURSING
Enema given to pt. Pt tolerated well. Moderate size BM of light brown loose stool. Pt still on bedpan at this time, per pt request. Will continue to monitor.

## 2022-10-24 NOTE — PROGRESS NOTES
[facility]  Adult Nutrition   Progress Note (Follow-Up)    SUMMARY      Recommendations:   Continue current cardiac diet as tolerated with Glucerna with meals and continue to encourage intake and assist prn.     Goals:   Pt to continue to meet 100% of her EEN/EPN and to understand diet for CHF to lower her BNP.    Dietitian Rounds Brief  F/U Nutrition Note: Pt ate 100% of her lunch today and is suppose to be getting ready to be discharged. Her LBM was yesterday. Will continue to follow prn till discharge.    Diet order: Cardiac    Oral Supplement: Glucerna with meals    % Intake of Estimated Energy Needs: 75 - 100 %  % Meal Intake: 75 - 100 %    Estimated/Assessed Needs  Weight Used For Calorie Calculations: 56.5 kg (124 lb 9 oz)  Energy Calorie Requirements (kcal): 4944-6876 kcas/day (25-30 kcals/kg ABW)  Energy Need Method: Kcal/kg  Protein Requirements:  g/day (1.5-2 g/kg IBW) 0.8-0.9 g/day (42-48 g/kg IBW) for eGFR < 50 with diabetic neuropathy  Weight Used For Protein Calculations: 53 kg (116 lb 13.5 oz)     Estimated Fluid Requirement Method: RDA Method  RDA Method (mL): 1413       Weight History:  Wt Readings from Last 5 Encounters:   10/24/22 54.5 kg (120 lb 2.4 oz)   10/18/22 56.2 kg (124 lb)   10/12/22 57 kg (125 lb 9.6 oz)   09/23/22 56 kg (123 lb 6.4 oz)   09/14/22 55.8 kg (123 lb)        Reason for Assessment  Reason For Assessment: consult  Diagnosis: other (see comments) (Pneumonia)  Relevant Medical History: Hypertension, Hyperlipidemia, Thyroid disease, hypothyroid, Encounter for blood transfusion, COPD (chronic obstructive pulmonary disease), COPD with acute exacerbation, Anticoagulant long-term use, aspirin, Hip arthritis, Pneumonia of right lower lobe due to infectious organism, PE (pulmonary thromboembolism), DVT (deep venous thrombosis), Anemia due to multiple mechanisms, Anemia, chronic disease, Normocytic normochromic anemia, Homocysteinemia, Heterozygous MTHFR mutation,  CHF (congestive heart failure), Aortic aneurysm, Chest pain, Syncope, GERD (gastroesophageal reflux disease), GI bleed, Diabetes mellitus type II, no longer on any DM meds, UTI (urinary tract infection), Anemia, chronic renal failure, stage 3 (moderate), Type 2 diabetes mellitus with stage 3 chronic kidney disease, Incontinent of urine, Oxygen dependent, use oxygen as needed, Gout, Depression  Interdisciplinary Rounds: attended    Medications:Pertinent Medications Reviewed  Scheduled Meds:   ascorbic acid (vitamin C)  500 mg Oral Daily    aspirin  81 mg Oral Daily    atorvastatin  40 mg Oral Daily    diltiaZEM  180 mg Oral Daily    docusate sodium  100 mg Oral Daily    enoxparin  1 mg/kg Subcutaneous Q24H    furosemide (LASIX) injection  20 mg Intravenous Daily    isosorbide mononitrate  30 mg Oral Daily    lactulose  20 g Oral TID    levothyroxine  88 mcg Oral Before breakfast    losartan  25 mg Oral Daily    montelukast  10 mg Oral QHS    NON FORMULARY MEDICATION 1 puff  1 puff Inhalation Daily    polyethylene glycol  17 g Oral Daily    predniSONE  20 mg Oral Daily    sertraline  50 mg Oral Daily     Continuous Infusions:  PRN Meds:.albuterol-ipratropium, dextrose 10%, dextrose 10%, LORazepam, melatonin, ondansetron, polyethylene glycol, sodium chloride 0.9%, sodium chloride 0.9%    Labs: Pertinent Labs Reviewed  Clinical Chemistry:  Recent Labs   Lab 10/24/22  0500      K 5.2*      CO2 26   *   BUN 60*   CREATININE 1.8*   CALCIUM 8.2*   PROT 5.4*   ALBUMIN 2.7*   BILITOT 0.6   ALKPHOS 63   AST 33   ALT 26   ANIONGAP 9   MG 2.5     CBC:   Recent Labs   Lab 10/24/22  0500   WBC 6.37   RBC 2.64*   HGB 8.5*   HCT 27.1*      *   MCH 32.2*   MCHC 31.4*     Cardiac Profile:  Recent Labs   Lab 10/19/22  1745 10/23/22  0419   BNP 3,449* >4,500*   TROPONINI 1.370* 0.790*     Monitor and Evaluation  Food and Nutrient Intake: food and beverage intake, energy intake  Food and Nutrient  Adminstration: diet order  Knowledge/Beliefs/Attitudes: food and nutrition knowledge/skill, beliefs and attitudes  Physical Activity and Function: nutrition-related ADLs and IADLs, factors affecting access to physical activity  Anthropometric Measurements: weight, weight change, body mass index  Biochemical Data, Medical Tests and Procedures: lipid profile, inflammatory profile, glucose/endocrine profile, gastrointestinal profile, electrolyte and renal panel  Nutrition-Focused Physical Findings: overall appearance     Nutrition Risk  Level of Risk/Frequency of Follow-up: low     Nutrition Follow-Up  RD Follow-up?: Yes    Clarisa De Los Santos RD 10/24/2022 12:50 PM

## 2022-10-24 NOTE — PROGRESS NOTES
[facility]  Department of Cardiology  Consult Note      PATIENT NAME: Blaire Cage  MRN: [id number]  TODAY'S DATE: 10/24/2022  ADMIT DATE: 10/16/2022                          CONSULT REQUESTED BY: Joey Ramsey MD    SUBJECTIVE     PRINCIPAL PROBLEM: Pneumonia    10/24/22    Patient sitting up in the chair in no acute distress.   Lungs reveal few crackles  Cardiac reveals S3 gallop in a diastolic rumble at the apex  Echocardiogram showed depressed LV function and calcific mitral valve stenosis mild to degree in addition there is LV dysfunction and evidence of old apical scar      10/23/22:  Patient seen resting in bed with SOB noted. She is wheezing as well. Chest xray does show some pleural fluid.     10/22/22:  Patient seen resting in bed. She states her breathing is stable.     10//20/22    Patient lying in bed in no acute distress. Feels better. NO SOB. On room air doing well. Denies CP.         REASON FOR CONSULT:  Abnormal ECHO      HPI:    89 year old female patient known to myself and SARA De La Fuente. She has been having SOB, fatigue fever and cough and came to ER for evaluation was diagnosed with pneumonia  ECHO shows apical hypokinesis and we have been consulted for the same. She denies CP or Palpitations.         Patient information was obtained from patient, past medical records and ER records.   HISTORY OF PRESENT ILLNESS:   Blaire Cage is a 89 y.o. old female who  has a past medical history of Anemia due to multiple mechanisms (6/29/2018), Anemia, chronic disease (6/29/2018), Anemia, chronic renal failure, stage 3 (moderate) (6/29/2018), Anticoagulant long-term use, Aortic aneurysm, Chest pain, CHF (congestive heart failure), COPD (chronic obstructive pulmonary disease), COPD with acute exacerbation (1/9/2015), Depression, Diabetes mellitus type II, DVT (deep venous thrombosis) (06/09/2018), Encounter for blood transfusion, GERD (gastroesophageal reflux disease), GI bleed, Gout,  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 (8/7/2018), Hip arthritis (3/1/2016), Homocysteinemia (8/7/2018), Hyperlipidemia, Hypertension, Incontinent of urine, Normocytic normochromic anemia (6/29/2018), Oxygen dependent, PE (pulmonary thromboembolism) (06/09/2018), Pneumonia of right lower lobe due to infectious organism (9/11/2017), Syncope, Thyroid disease, Type 2 diabetes mellitus with stage 3 chronic kidney disease (1/18/2013), and UTI (urinary tract infection).. The patient presented to [facility] on 10/16/2022 with a primary complaint of Shortness of Breath and Weakness (Pt reports to ED with weakness/SOB starting last night. Pt has a hx of COPD(as needed/at night). Pt in nad at triage, vss, in wheelchair.)  .      Very pleasant 89-year-old female presents to the emergency room with shortness of breath.  The patient states she has been having shortness of breath and weakness for the past 2 days.  Patient states last night shortness of breath got progressively worse.  It she also endorses generalized weakness a cough and fatigue.  She describes her cough as productive in nature and yellow in color.  She denies fever chills hematemesis hemoptysis lightheadedness dizziness or syncope.       The patient does use oxygen at home at night she is on CPAP.  She has been using her oxygen more secondary to dyspnea with exertion.  She denied actual chest pain lightheadedness dizziness palpitations or syncope     She describes her symptoms as moderate to severe severity with no exacerbating or alleviating factors        Past medical history is significant for chronic kidney disease stage 3, hypertension, aortic aneurysm, CHF, COPD, type 2 diabetes, depression, arthritis, hyperlipidemia, normocytic anemia, oxygen dependent, pulmonary in emboli on 06/09/2018, thyroid disease.     In the emergency room she was found to have pneumonia and was started with a broad-spectrum antibiotic and DuoNeb treatments.  She was also  found to have a component of heart failure and given IV Lasix.  We will monitor her output and intake closely and consult her cardiologist for further recommendations  REVIEW OF SYSTEMS:     Review of Systems     HENT: Negative.     Eyes: Negative.    Respiratory: + SOB much improved sitting up in chair  Cardiovascular:  negative for leg swelling  Gastrointestinal: Negative.  + constipation  Genitourinary: Negative.    Musculoskeletal: Negative.    Skin: Negative.    Neurological:  Positive for weakness.   Endo/Heme/Allergies: Negative.    Psychiatric/Behavioral: Negative.           Review of patient's allergies indicates:   Allergen Reactions    Carvedilol Other (See Comments)     Bradycardia and syncope    Boniva [ibandronate]     Codeine     Hydralazine analogues     Iodinated contrast media Hives    Kionex [sodium polystyrene sulfonate] Diarrhea     From encounter 12/11/17 for diarrhea--not true allergy.    Lisinopril     Morphine        Past Medical History:   Diagnosis Date    Anemia due to multiple mechanisms 6/29/2018    Anemia, chronic disease 6/29/2018    Anemia, chronic renal failure, stage 3 (moderate) 6/29/2018    Anticoagulant long-term use     aspirin    Aortic aneurysm     Chest pain     CHF (congestive heart failure)     COPD (chronic obstructive pulmonary disease)     COPD with acute exacerbation 1/9/2015    Depression     Diabetes mellitus type II     no longer on any DM meds    DVT (deep venous thrombosis) 06/09/2018    Encounter for blood transfusion     GERD (gastroesophageal reflux disease)     GI bleed     Gout     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 8/7/2018    Hip arthritis 3/1/2016    Homocysteinemia 8/7/2018    Hyperlipidemia     Hypertension     Incontinent of urine     Normocytic normochromic anemia 6/29/2018    Oxygen dependent     use oxygen as needed    PE (pulmonary thromboembolism) 06/09/2018    Pneumonia of right lower lobe due to infectious organism 9/11/2017    Syncope     Thyroid  disease     hypothyroid    Type 2 diabetes mellitus with stage 3 chronic kidney disease 1/18/2013    UTI (urinary tract infection)      Past Surgical History:   Procedure Laterality Date    APPENDECTOMY      CHOLECYSTECTOMY      COLONOSCOPY Left 10/29/2020    Procedure: COLONOSCOPY;  Surgeon: Singh Kee III, MD;  Location: [facility];  Service: Endoscopy;  Laterality: Left;    ESOPHAGOGASTRODUODENOSCOPY Left 10/26/2020    Procedure: EGD (ESOPHAGOGASTRODUODENOSCOPY);  Surgeon: Kareem Gramajo MD;  Location: [facility] ENDO;  Service: Endoscopy;  Laterality: Left;    ESOPHAGOGASTRODUODENOSCOPY Left 10/28/2020    Procedure: EGD (ESOPHAGOGASTRODUODENOSCOPY);  Surgeon: Kareem Gramajo MD;  Location: [facility];  Service: Endoscopy;  Laterality: Left;    ESOPHAGOGASTRODUODENOSCOPY N/A 9/16/2021    Procedure: EGD (ESOPHAGOGASTRODUODENOSCOPY);  Surgeon: Kareem Gramajo MD;  Location: [facility];  Service: Endoscopy;  Laterality: N/A;    FRACTURE SURGERY      right hip     HERNIA REPAIR      groin    HYSTERECTOMY      INSERTION OF IMPLANTABLE LOOP RECORDER N/A 12/12/2018    Procedure: Insertion, Implantable Loop Recorder;  Surgeon: Ashok Herbert MD;  Location: [facility] CATH LAB;  Service: Cardiology;  Laterality: N/A;    PERCUTANEOUS PINNING OF HIP Left 3/23/2019    Procedure: PINNING, HIP, PERCUTANEOUS;  Surgeon: Jorje Hamlin MD;  Location: [facility] OR;  Service: Orthopedics;  Laterality: Left;    SMALL BOWEL ENTEROSCOPY N/A 10/29/2020    Procedure: ENTEROSCOPY;  Surgeon: Singh Kee III, MD;  Location: [facility];  Service: Endoscopy;  Laterality: N/A;    VARICOSE VEIN SURGERY       Social History     Tobacco Use    Smoking status: Former     Packs/day: 0.35     Years: 44.00     Pack years: 15.40     Types: Cigarettes     Start date: 1970    Smokeless tobacco: Never    Tobacco comments:     1/08/2015   Substance Use Topics    Alcohol use: No     Alcohol/week: 0.0 standard drinks    Drug use: No        OBJECTIVE      VITAL SIGNS (Most Recent)  Temp: 97.3 °F (36.3 °C) (10/24/22 0736)  Pulse: 85 (10/24/22 1006)  Resp: 18 (10/24/22 1006)  BP: (!) 140/78 (10/24/22 0736)  SpO2: (!) 94 % (10/24/22 1006)    VENTILATION STATUS  Resp: 18 (10/24/22 1006)  SpO2: (!) 94 % (10/24/22 1006)       I & O (Last 24H):  Intake/Output Summary (Last 24 hours) at 10/24/2022 1025  Last data filed at 10/23/2022 1712  Gross per 24 hour   Intake 240 ml   Output --   Net 240 ml       WEIGHTS  Wt Readings from Last 3 Encounters:   10/24/22 0300 54.5 kg (120 lb 2.4 oz)   10/23/22 0320 55.8 kg (123 lb 0.3 oz)   10/22/22 0345 57.6 kg (126 lb 15.8 oz)   10/21/22 0350 57.5 kg (126 lb 12.2 oz)   10/16/22 2233 56.5 kg (124 lb 9 oz)   10/16/22 1621 57.6 kg (127 lb)   10/18/22 1200 56.2 kg (124 lb)   10/12/22 1606 57 kg (125 lb 9.6 oz)       PHYSICAL EXAM  GENERAL: Elderly female in no acute distress on nasal cannula  HEENT: Normocephalic. Pupils normal and conjunctivae normal.  Mucous membranes normal, no cyanosis or icterus, trachea central,no pallor or icterus is noted..   NECK: No JVD. No bruit..   CARDIAC: systolic murmur noted  CHEST ANATOMY: normal.   LUNGS: coarse breathsounds mild rhochi  ABDOMEN: Soft no masses or organomegaly.  No abdomen pulsations or bruits.  Normal bowel sounds. No pulsations and no masses felt, No guarding or rebound.   URINARY: No carter catheter   EXTREMITIES: No cyanosis, clubbing or edema noted at this time., no calf tenderness bilaterally.   PERIPHERAL VASCULAR SYSTEM: Good palpable distal pulses.   CENTRAL NERVOUS SYSTEM: No focal motor or sensory deficits noted.   SKIN: Skin without lesions, moist, well perfused.   MUSCLE STRENGTH & TONE: No noteable weakness, atrophy or abnormal movement.     HOME MEDICATIONS:  No current facility-administered medications on file prior to encounter.     Current Outpatient Medications on File Prior to Encounter   Medication Sig Dispense Refill    albuterol (PROVENTIL) 2.5 mg /3 mL (0.083 %)  nebulizer solution Take 3 mLs (2.5 mg total) by nebulization every 6 (six) hours as needed for Wheezing or Shortness of Breath. USE 1 VIAL IN NEBULIZER EVERY 6 HOURS AS NEEDED FOR WHEEZING. RESCUE 360 each 3    allopurinoL (ZYLOPRIM) 100 MG tablet Take 1 tablet (100 mg total) by mouth once daily. 90 tablet 3    ascorbic acid, vitamin C, (VITAMIN C) 500 MG tablet Take 500 mg by mouth once daily.      calcium carbonate (OS-TASIA) 500 mg calcium (1,250 mg) tablet Take 1 tablet by mouth 2 (two) times daily.      diltiaZEM (CARDIZEM CD) 180 MG 24 hr capsule Take 1 capsule (180 mg total) by mouth once daily. 90 capsule 3    fish oil-omega-3 fatty acids 300-1,000 mg capsule Take 2 g by mouth every evening.      fluticasone-umeclidin-vilanter (TRELEGY ELLIPTA) 200-62.5-25 mcg inhaler Inhale 1 puff into the lungs once daily. 60 each 11    folic acid-vit B6-vit B12 2.5-25-2 mg (FOLBIC) 2.5-25-2 mg Tab Take 1 tablet by mouth once daily.      furosemide (LASIX) 20 MG tablet Take 1 tablet only as needed, for swelling, increase SOB, weight gain of 3 lbs overnight or 5 lbs for the week 30 tablet prn    levothyroxine (SYNTHROID) 88 MCG tablet Take 1 tablet (88 mcg total) by mouth once daily. 90 tablet 3    losartan (COZAAR) 25 MG tablet Take 1 tablet (25 mg total) by mouth once daily. (Patient taking differently: Take 25 mg by mouth once daily. prn) 90 tablet 3    mepolizumab 100 mg/mL AtIn Inject 1 mL (100 mg total) into the skin every 28 days. 1 mL 12    montelukast (SINGULAIR) 10 mg tablet TAKE 1 TABLET BY MOUTH ONCE DAILY IN THE EVENING (Patient taking differently: Take 10 mg by mouth every evening.) 90 tablet 3    multivitamin (THERAGRAN) tablet Take 1 tablet by mouth once daily.      omeprazole (PRILOSEC) 40 MG capsule Take 1 capsule (40 mg total) by mouth once daily. 90 capsule 3    rosuvastatin (CRESTOR) 20 MG tablet Take 1 tablet (20 mg total) by mouth once daily. 90 tablet 3    sertraline (ZOLOFT) 50 MG tablet Take 1 tablet  (50 mg total) by mouth once daily. 90 tablet 1    simethicone 180 mg Cap Take 1 capsule by mouth 3 (three) times daily as needed.      acetaminophen (TYLENOL) 325 MG tablet Take 325 mg by mouth every 6 (six) hours as needed for Pain.      ciclopirox (PENLAC) 8 % Soln Apply topically nightly. 6.6 mL 3    ferrous sulfate (FEOSOL) 325 mg (65 mg iron) Tab tablet Take 325 mg by mouth once daily.      polyethylene glycol (GLYCOLAX) 17 gram/dose powder Take 17 g by mouth 2 (two) times daily. 850 g 3    [DISCONTINUED] clobetasol 0.05% (TEMOVATE) 0.05 % Oint Apply topically 2 (two) times daily. 45 g 1    [DISCONTINUED] diclofenac sodium (VOLTAREN) 1 % Gel Apply 2 g topically once daily. (Patient taking differently: Apply 2 g topically as needed.) 100 g prn    [DISCONTINUED] ketoconazole (NIZORAL) 2 % shampoo Apply topically twice a week. (Patient taking differently: Apply 1 application topically twice a week.) 120 mL 2    [DISCONTINUED] meclizine (ANTIVERT) 25 mg tablet Take 1 tablet (25 mg total) by mouth 3 (three) times daily as needed. 20 tablet prn    [DISCONTINUED] nitroGLYCERIN (NITROSTAT) 0.4 MG SL tablet Place 1 tablet (0.4 mg total) under the tongue every 5 (five) minutes as needed for Chest pain. As needed (Patient taking differently: Place 0.4 mg under the tongue every 5 (five) minutes as needed for Chest pain. Seek medical help if pain is not relieved after the third dose.) 30 tablet 3    [DISCONTINUED] nystatin (MYCOSTATIN) powder Apply topically 4 (four) times daily. 60 g 1       SCHEDULED MEDS:   ascorbic acid (vitamin C)  500 mg Oral Daily    aspirin  81 mg Oral Daily    atorvastatin  40 mg Oral Daily    diltiaZEM  180 mg Oral Daily    docusate sodium  100 mg Oral Daily    enoxparin  1 mg/kg Subcutaneous Q24H    furosemide (LASIX) injection  20 mg Intravenous Daily    isosorbide mononitrate  30 mg Oral Daily    lactulose  20 g Oral TID    levothyroxine  88 mcg Oral Before breakfast    losartan  25 mg Oral  Daily    montelukast  10 mg Oral QHS    NON FORMULARY MEDICATION 1 puff  1 puff Inhalation Daily    polyethylene glycol  17 g Oral Daily    predniSONE  20 mg Oral Daily    sertraline  50 mg Oral Daily       CONTINUOUS INFUSIONS:    PRN MEDS:albuterol-ipratropium, dextrose 10%, dextrose 10%, LORazepam, melatonin, ondansetron, polyethylene glycol, sodium chloride 0.9%, sodium chloride 0.9%    LABS AND DIAGNOSTICS     CBC LAST 3 DAYS  Recent Labs   Lab 10/18/22  0417 10/19/22  0405 10/20/22  0524 10/22/22  0421 10/23/22  0419 10/24/22  0500   WBC 11.05 8.02 7.78 10.10 6.88 6.37   RBC 2.72* 2.44* 2.64* 2.71* 2.57* 2.64*   HGB 8.7* 7.8* 8.4* 8.6* 8.4* 8.5*   HCT 27.4* 24.8* 26.9* 27.5* 26.3* 27.1*   * 102* 102* 102* 102* 103*   MCH 32.0* 32.0* 31.8* 31.7* 32.7* 32.2*   MCHC 31.8* 31.5* 31.2* 31.3* 31.9* 31.4*   RDW 13.6 13.9 14.1 14.0 14.2 13.9    193 176 209 166 167   MPV 11.4 11.4 11.6 12.3 11.5 11.7   GRAN 75.4*  8.3* 71.3  5.7 72.7  5.7  --   --   --    LYMPH 13.4*  1.5 16.8*  1.4 14.4*  1.1  --   --   --    MONO 10.3  1.1* 10.7  0.9 11.2  0.9  --   --   --    BASO 0.02 0.02 0.03  --   --   --    NRBC 0 0 0  --   --   --        COAGULATION LAST 3 DAYS  No results for input(s): LABPT, INR, APTT in the last 168 hours.    CHEMISTRY LAST 3 DAYS  Recent Labs   Lab 10/22/22  0421 10/23/22  0419 10/24/22  0500    145 138   K 5.1 5.5* 5.2*    107 103   CO2 26 29 26   ANIONGAP 10 9 9   BUN 67* 59* 60*   CREATININE 2.0* 1.9* 1.8*   * 122* 162*   CALCIUM 8.4* 8.5* 8.2*   MG  --   --  2.5   ALBUMIN 2.9* 2.7* 2.7*   PROT 5.9* 5.6* 5.4*   ALKPHOS 67 59 63   ALT 27 26 26   AST 33 35 33   BILITOT 0.5 0.5 0.6       CARDIAC PROFILE LAST 3 DAYS  Recent Labs   Lab 10/19/22  1745 10/23/22  0419   BNP 3,449* >4,500*   TROPONINI 1.370* 0.790*       ENDOCRINE LAST 3 DAYS  No results for input(s): TSH, PROCAL in the last 168 hours.    LAST ARTERIAL BLOOD GAS  ABG  No results for input(s): PH, PO2,  PCO2, HCO3, BE in the last 168 hours.    LAST 7 DAYS MICROBIOLOGY   Microbiology Results (last 7 days)       Procedure Component Value Units Date/Time    Blood culture x two cultures. Draw prior to antibiotics. [[id number]] Collected: 10/16/22 1945    Order Status: Completed Specimen: Blood from Peripheral, Antecubital, Right Updated: 10/21/22 2032     Blood Culture, Routine No growth after 5 days.    Narrative:      Aerobic and anaerobic    Blood culture x two cultures. Draw prior to antibiotics. [[id number]] Collected: 10/16/22 1840    Order Status: Completed Specimen: Blood from Peripheral, Hand, Right Updated: 10/21/22 2032     Blood Culture, Routine No growth after 5 days.    Narrative:      Aerobic and anaerobic            MOST RECENT IMAGING  X-Ray Abdomen AP 1 View  KUB    CLINICAL DATA: Constipation    FINDINGS: Comparison to October 22. Mild gaseous distention of small bowel loops throughout the abdomen is stable. Intraluminal gas is noted at the level of the rectum. No mass, suspicious calcification, or free air is identified. No acute osseous abnormality is demonstrated. There is multilevel lumbar degenerative disc disease. Right upper quadrant surgical clips are noted, with postoperative changes at both hips.    IMPRESSION:  1. Mild gaseous distention of small bowel loops throughout the abdomen suggesting ileus.  2. No other significant findings.    Electronically signed by:  Ariel Barr MD  10/24/2022 6:50 AM CDT Workstation: [id number]  X-Ray Chest AP Portable  Chest single view    CLINICAL DATA: Shortness of breath    FINDINGS: Comparison to October 22. Cardiomegaly is stable. The aortic arch is calcified. The lungs are hyperinflated. Mild bilateral interstitial prominence may reflect mild interstitial edema, improved. Stable small left pleural effusion.    IMPRESSION:  1. Cardiomegaly with findings of probable mild interstitial edema, improved.  2. Stable small left pleural effusion.  3.  Pulmonary hyperinflation.    Electronically signed by:  Ariel Barr MD  10/24/2022 6:47 AM CDT Workstation: [id number]      ECHOCARDIOGRAM RESULTS (last 5)  Results for orders placed during the hospital encounter of 10/16/22    Echo    Interpretation Summary  · Moderate concentric hypertrophy and mildly decreased systolic function.  · The estimated ejection fraction is 45%.  · There are segmental left ventricular wall motion abnormalities. Anterior apical akinesia  · Moderate to severe tricuspid regurgitation.  · Moderate-to-severe mitral regurgitation.  · There is moderate to severe pulmonary hypertension.  · Severe left atrial enlargement.  · Grade II left ventricular diastolic dysfunction.  · Atrial fibrillation not observed.  · There is right ventricular hypertrophy.  · There is mild mitral stenosis. Mitral valve not well seen because of heavy mitral annular calcification      Results for orders placed during the hospital encounter of 03/21/22    Echo    Interpretation Summary  · The estimated PA systolic pressure is 43 mmHg.  · The left ventricle is normal in size with mild concentric hypertrophy and normal systolic function.  · The estimated ejection fraction is 65%.  · Grade II left ventricular diastolic dysfunction.  · Normal right ventricular size with normal right ventricular systolic function.  · Moderate mitral regurgitation.  · There is moderate mitral stenosis.  · There is moderate mitral leaflet calcification.  · Mild tricuspid regurgitation.  · There is mild pulmonary hypertension.  · There is mild aortic valve stenosis.  · Aortic valve area is 1.77 cm2; peak velocity is 2.08 m/s; mean gradient is 11 mmHg.  · Moderate left atrial enlargement.  · Mild right atrial enlargement.      Results for orders placed during the hospital encounter of 10/19/21    Echo    Interpretation Summary  · The estimated PA systolic pressure is 44 mmHg.  · The left ventricle is normal in size with severe concentric  hypertrophy and normal systolic function.  · The estimated ejection fraction is 60%.  · Grade II left ventricular diastolic dysfunction.  · Mild right ventricular enlargement.  · Mild left atrial enlargement.  · Mild to moderate tricuspid regurgitation.  · Mild-to-moderate mitral regurgitation.  · Normal central venous pressure (3 mmHg).      Results for orders placed during the hospital encounter of 05/07/20    Echo Color Flow Doppler? Yes; Bubble Contrast? Yes    Interpretation Summary  · Concentric left ventricular hypertrophy.  · The mean diastolic gradient across the mitral valve is 5 mmHg at a heart rate of 57 bpm.  · Normal left ventricular systolic function. The estimated ejection fraction is 65%.  · Grade II (moderate) left ventricular diastolic dysfunction consistent with pseudonormalization.  · Normal right ventricular systolic function.  · Severe left atrial enlargement.  · Mild right atrial enlargement.  · Mild mitral regurgitation.  · Mild to moderate tricuspid regurgitation.  · Normal central venous pressure (3 mmHg).  · The estimated PA systolic pressure is 50 mmHg.  · Pulmonary hypertension present.  · No PFO on color flow or saline bubble study      CURRENT/PREVIOUS VISIT EKG  Results for orders placed or performed during the hospital encounter of 10/16/22   EKG 12-LEAD    Collection Time: 10/16/22  6:27 PM    Narrative    Test Reason : R07.9,    Vent. Rate : 069 BPM     Atrial Rate : 069 BPM     P-R Int : 184 ms          QRS Dur : 090 ms      QT Int : 430 ms       P-R-T Axes : 052 038 -48 degrees     QTc Int : 460 ms    Normal sinus rhythm  Anteroseptal infarct (cited on or before 20-JUL-2022)  Abnormal ECG  When compared with ECG of 20-JUL-2022 17:02,  Questionable change in initial forces of Anterior leads  Non-specific change in ST segment in Anterior leads  Nonspecific T wave abnormality now evident in Inferior leads  T wave inversion now evident in Anterior leads  Confirmed by Sudhakar BEST,  Gualberto ([id number]) on 10/19/2022 6:05:19 PM    Referred By: CELESTE   SELF           Confirmed By:Gualberto De La Fuente MD           ASSESSMENT/PLAN:     Active Hospital Problems    Diagnosis    *Pneumonia    Acute on chronic combined systolic and diastolic heart failure       ASSESSMENT & PLAN:     Acute on Chronic CHFrEF- Improving  Pneumonia  Moderate TR  Moderate MR and stenosis and LV dysfunction with evidence of apical scarring  Apical Hypokinesis  PH  Anemia- hgb 8.5 today   SHALINI on CKD    RECOMMENDATIONS:    Continue to gently diurese  Continue steroids  Continue nebulizers.   Strict I and O  CXR and BNP in am  Will follow    Sadie Perez NP  [facility]  Department of Cardiology  Date of Service: 10/24/2022      I have personally interviewed and examined the patient. I have reviewed the Nurse Practitioner's history and physical, assessment, and plan. I agree with the findings and made appropriate changes as necessary in recommendations.      Luis Carlos Chaudhari MD  Department of Cardiology  [facility]  10/24/2022 2:26 PM

## 2022-10-24 NOTE — CARE UPDATE
10/24/22 0753   Patient Assessment/Suction   Respiratory Effort Unlabored   Expansion/Accessory Muscles/Retractions no use of accessory muscles   All Lung Fields Breath Sounds equal bilaterally;diminished   PRE-TX-O2   O2 Device (Oxygen Therapy) nasal cannula   $ Is the patient on Low Flow Oxygen? Yes   Flow (L/min) 2   SpO2 98 %   Pulse Oximetry Type Intermittent   $ Pulse Oximetry - Multiple Charge Pulse Oximetry - Multiple   Pulse 76   Aerosol Therapy   $ Aerosol Therapy Charges PRN treatment not required   Preset CPAP/BiPAP Settings   Mode Of Delivery BiPAP;Standby   Respiratory Evaluation   $ Care Plan Tech Time 15 min

## 2022-10-24 NOTE — PT/OT/SLP PROGRESS
"Physical Therapy Treatment    Patient Name:  Blaire Cage   MRN:  [id number]    Recommendations:     Discharge Recommendations:  home health PT   Discharge Equipment Recommendations: none   Barriers to discharge: None    Assessment:     Blaire Cage is a 89 y.o. female admitted with a medical diagnosis of Pneumonia.  She presents with the following impairments/functional limitations:  weakness, impaired endurance, impaired self care skills, impaired functional mobility, pain, impaired cardiopulmonary response to activity.  Pt found with HOB elevated, c/o 10/10 abdominal pain and with soiled brief; agreed to transfer to BS to promote BM.  Rolled to sidelying with CGA for doffing soiled brief and for dependent hygiene of skin.  Transferred to EOB, then performed stand pivot transfer with RW and Kristina bed>BSC>chair. Extended time on BSC for flatus and bowel movement.  Following toileting, change of gown and brief, and LE exercises in chair, pt reported 0/10 abdominal pain and remained up in chair with chair alarm, call light, and all needs at hand.     Rehab Prognosis: Good; patient would benefit from acute skilled PT services to address these deficits and reach maximum level of function.    Recent Surgery: * No surgery found *      Plan:     During this hospitalization, patient to be seen 5 x/week to address the identified rehab impairments via gait training, therapeutic activities, therapeutic exercises and progress toward the following goals:    Plan of Care Expires:  11/23/22    Subjective     Chief Complaint: "my stomach is just boiling and rolling"  Patient/Family Comments/goals: get back home when cleared medically  Pain/Comfort:  Pain Rating 1: 10/10  Location - Side 1: Bilateral  Location - Orientation 1: generalized  Location 1: abdomen  Pain Addressed 1: Reposition  Pain Rating Post-Intervention 1: 0/10      Objective:     Communicated with nurse Marcos prior to session.  Patient found HOB elevated " with bed alarm, oxygen, peripheral IV, telemetry upon PT entry to room.     General Precautions: Standard, fall, diabetic, respiratory   Orthopedic Precautions:N/A   Braces: N/A  Respiratory Status: Nasal cannula, flow   L/min     Functional Mobility:  Bed Mobility:     Rolling Left:  contact guard assistance and VC, use of bedrails  Rolling Right: contact guard assistance and VC, use of bedrails  Supine to Sit: minimum assistance and VC for technique  Transfers:     Sit to Stand:  minimum assistance with rolling walker  Bed to Chair: minimum assistance with  rolling walker  using  Stand Pivot  Toilet Transfer: minimum assistance with  rolling walker  using  Stand Pivot      AM-PAC 6 CLICK MOBILITY          Treatment & Education:  -Extended time on BSC, sitting balance Supv  -LE seated exercises: AP, LAQ, hip ab/adduction 1-2x10  -Static stand x 1 minute for hygiene post BM      Patient left up in chair with all lines intact, call button in reach, chair alarm on, and nursing staff notified..    GOALS:   Multidisciplinary Problems       Physical Therapy Goals          Problem: Physical Therapy    Goal Priority Disciplines Outcome Goal Variances Interventions   Physical Therapy Goal     PT, PT/OT      Description: Goals to be met by: 22     Patient will increase functional independence with mobility by performin. Supine to sit with Supervision  2. Sit to stand transfer with Supervision  3. Bed to chair transfer with Supervision using Rolling Walker  4. Gait  x 50 feet with Supervision using Rolling Walker.                              Time Tracking:     PT Received On: 10/24/22  PT Start Time: 1030     PT Stop Time: 1104  PT Total Time (min): 34 min     Billable Minutes: Therapeutic Activity 22 and Therapeutic Exercise 12    Treatment Type: Treatment  PT/PTA: PTA     PTA Visit Number: 1     10/24/2022

## 2022-10-24 NOTE — CARE UPDATE
10/23/22 0419   Patient Assessment/Suction   Level of Consciousness (AVPU) alert   Respiratory Effort Mild;Labored   Expansion/Accessory Muscles/Retractions no use of accessory muscles   All Lung Fields Breath Sounds wheezes, expiratory   Rhythm/Pattern, Respiratory no shortness of breath reported   Cough Frequency no cough   PRE-TX-O2   O2 Device (Oxygen Therapy) nasal cannula   $ Is the patient on Low Flow Oxygen? Yes   Flow (L/min) 2   SpO2 95 %   Pulse Oximetry Type Intermittent   $ Pulse Oximetry - Multiple Charge Pulse Oximetry - Multiple   Pulse 75   Resp 15   Positioning   Head of Bed (HOB) Positioning HOB elevated;HOB at 30 degrees   Aerosol Therapy   $ Aerosol Therapy Charges Aerosol Treatment   Daily Review of Necessity (SVN) completed   Respiratory Treatment Status (SVN) given   Treatment Route (SVN) mouthpiece   Patient Position (SVN) semi-Shetty's   Post Treatment Assessment (SVN) breath sounds improved   Signs of Intolerance (SVN) none   Breath Sounds Post-Respiratory Treatment   Throughout All Fields Post-Treatment All Fields   Throughout All Fields Post-Treatment aeration increased   Post-treatment Heart Rate (beats/min) 98   Post-treatment Resp Rate (breaths/min) 17   Education   $ Education Bronchodilator;15 min   Respiratory Evaluation   $ Care Plan Tech Time 15 min

## 2022-10-25 NOTE — NURSING
Tele room called in regards to pt  having ST elevations. MD notified, EKG reads NSR. Stat Troponin ordered. Critical at 0.752. MD has been made aware. No new orders given at this time. PT currently sitting up in chair asymptomatic. Will continue to monitor

## 2022-10-25 NOTE — PT/OT/SLP PROGRESS
Physical Therapy      Patient Name:  Blaire Cage   MRN:  [id number]    Patient not seen today secondary to X-ray, Other (Comment) (in pm, pending LE ultrasound.). Will follow-up .

## 2022-10-25 NOTE — PROGRESS NOTES
[facility]  Department of Cardiology  Progress Note      PATIENT NAME: Blaire Cage  MRN: [id number]  TODAY'S DATE: 10/25/2022  ADMIT DATE: 10/16/2022                          CONSULT REQUESTED BY: Joey Ramsey MD    SUBJECTIVE     PRINCIPAL PROBLEM: Pneumonia    10/25/22:  Patient seen resting in bed. She was having a BM this AM. Chest xray pending. BNP shows >4500.     10/24/22    Patient sitting up in the chair in no acute distress.   Lungs reveal few crackles  Cardiac reveals S3 gallop in a diastolic rumble at the apex  Echocardiogram showed depressed LV function and calcific mitral valve stenosis mild to degree in addition there is LV dysfunction and evidence of old apical scar      10/23/22:  Patient seen resting in bed with SOB noted. She is wheezing as well. Chest xray does show some pleural fluid.     10/22/22:  Patient seen resting in bed. She states her breathing is stable.     10//20/22    Patient lying in bed in no acute distress. Feels better. NO SOB. On room air doing well. Denies CP.         REASON FOR CONSULT:  Abnormal ECHO      HPI:    89 year old female patient known to myself and SARA De La Fuente. She has been having SOB, fatigue fever and cough and came to ER for evaluation was diagnosed with pneumonia  ECHO shows apical hypokinesis and we have been consulted for the same. She denies CP or Palpitations.         Patient information was obtained from patient, past medical records and ER records.   HISTORY OF PRESENT ILLNESS:   Blaire Cage is a 89 y.o. old female who  has a past medical history of Anemia due to multiple mechanisms (6/29/2018), Anemia, chronic disease (6/29/2018), Anemia, chronic renal failure, stage 3 (moderate) (6/29/2018), Anticoagulant long-term use, Aortic aneurysm, Chest pain, CHF (congestive heart failure), COPD (chronic obstructive pulmonary disease), COPD with acute exacerbation (1/9/2015), Depression, Diabetes mellitus type II, DVT (deep venous  thrombosis) (06/09/2018), Encounter for blood transfusion, GERD (gastroesophageal reflux disease), GI bleed, Gout, 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 (8/7/2018), Hip arthritis (3/1/2016), Homocysteinemia (8/7/2018), Hyperlipidemia, Hypertension, Incontinent of urine, Normocytic normochromic anemia (6/29/2018), Oxygen dependent, PE (pulmonary thromboembolism) (06/09/2018), Pneumonia of right lower lobe due to infectious organism (9/11/2017), Syncope, Thyroid disease, Type 2 diabetes mellitus with stage 3 chronic kidney disease (1/18/2013), and UTI (urinary tract infection).. The patient presented to [facility] on 10/16/2022 with a primary complaint of Shortness of Breath and Weakness (Pt reports to ED with weakness/SOB starting last night. Pt has a hx of COPD(as needed/at night). Pt in nad at triage, vss, in wheelchair.)  .      Very pleasant 89-year-old female presents to the emergency room with shortness of breath.  The patient states she has been having shortness of breath and weakness for the past 2 days.  Patient states last night shortness of breath got progressively worse.  It she also endorses generalized weakness a cough and fatigue.  She describes her cough as productive in nature and yellow in color.  She denies fever chills hematemesis hemoptysis lightheadedness dizziness or syncope.       The patient does use oxygen at home at night she is on CPAP.  She has been using her oxygen more secondary to dyspnea with exertion.  She denied actual chest pain lightheadedness dizziness palpitations or syncope     She describes her symptoms as moderate to severe severity with no exacerbating or alleviating factors        Past medical history is significant for chronic kidney disease stage 3, hypertension, aortic aneurysm, CHF, COPD, type 2 diabetes, depression, arthritis, hyperlipidemia, normocytic anemia, oxygen dependent, pulmonary in emboli on 06/09/2018, thyroid disease.     In the emergency room  she was found to have pneumonia and was started with a broad-spectrum antibiotic and DuoNeb treatments.  She was also found to have a component of heart failure and given IV Lasix.  We will monitor her output and intake closely and consult her cardiologist for further recommendations  REVIEW OF SYSTEMS:     Review of Systems     HENT: Negative.     Eyes: Negative.    Respiratory: + SOB much improved sitting up in chair  Cardiovascular:  negative for leg swelling  Gastrointestinal: Negative.  + constipation: now with bowel incontinence after laxatives   Genitourinary: Negative.    Musculoskeletal: Negative.    Skin: Negative.    Neurological:  Positive for weakness.   Endo/Heme/Allergies: Negative.    Psychiatric/Behavioral: Negative.           Review of patient's allergies indicates:   Allergen Reactions    Carvedilol Other (See Comments)     Bradycardia and syncope    Boniva [ibandronate]     Codeine     Hydralazine analogues     Iodinated contrast media Hives    Kionex [sodium polystyrene sulfonate] Diarrhea     From encounter 12/11/17 for diarrhea--not true allergy.    Lisinopril     Morphine        Past Medical History:   Diagnosis Date    Anemia due to multiple mechanisms 6/29/2018    Anemia, chronic disease 6/29/2018    Anemia, chronic renal failure, stage 3 (moderate) 6/29/2018    Anticoagulant long-term use     aspirin    Aortic aneurysm     Chest pain     CHF (congestive heart failure)     COPD (chronic obstructive pulmonary disease)     COPD with acute exacerbation 1/9/2015    Depression     Diabetes mellitus type II     no longer on any DM meds    DVT (deep venous thrombosis) 06/09/2018    Encounter for blood transfusion     GERD (gastroesophageal reflux disease)     GI bleed     Gout     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 8/7/2018    Hip arthritis 3/1/2016    Homocysteinemia 8/7/2018    Hyperlipidemia     Hypertension     Incontinent of urine     Normocytic normochromic anemia 6/29/2018    Oxygen  dependent     use oxygen as needed    PE (pulmonary thromboembolism) 06/09/2018    Pneumonia of right lower lobe due to infectious organism 9/11/2017    Syncope     Thyroid disease     hypothyroid    Type 2 diabetes mellitus with stage 3 chronic kidney disease 1/18/2013    UTI (urinary tract infection)      Past Surgical History:   Procedure Laterality Date    APPENDECTOMY      CHOLECYSTECTOMY      COLONOSCOPY Left 10/29/2020    Procedure: COLONOSCOPY;  Surgeon: Singh Kee III, MD;  Location: [facility];  Service: Endoscopy;  Laterality: Left;    ESOPHAGOGASTRODUODENOSCOPY Left 10/26/2020    Procedure: EGD (ESOPHAGOGASTRODUODENOSCOPY);  Surgeon: Kareem Gramajo MD;  Location: [facility];  Service: Endoscopy;  Laterality: Left;    ESOPHAGOGASTRODUODENOSCOPY Left 10/28/2020    Procedure: EGD (ESOPHAGOGASTRODUODENOSCOPY);  Surgeon: Kareem Gramajo MD;  Location: [facility];  Service: Endoscopy;  Laterality: Left;    ESOPHAGOGASTRODUODENOSCOPY N/A 9/16/2021    Procedure: EGD (ESOPHAGOGASTRODUODENOSCOPY);  Surgeon: Kareem Gramajo MD;  Location: [facility];  Service: Endoscopy;  Laterality: N/A;    FRACTURE SURGERY      right hip     HERNIA REPAIR      groin    HYSTERECTOMY      INSERTION OF IMPLANTABLE LOOP RECORDER N/A 12/12/2018    Procedure: Insertion, Implantable Loop Recorder;  Surgeon: Ashok Herbert MD;  Location: [facility] CATH LAB;  Service: Cardiology;  Laterality: N/A;    PERCUTANEOUS PINNING OF HIP Left 3/23/2019    Procedure: PINNING, HIP, PERCUTANEOUS;  Surgeon: Jorje Hamlin MD;  Location: [facility] OR;  Service: Orthopedics;  Laterality: Left;    SMALL BOWEL ENTEROSCOPY N/A 10/29/2020    Procedure: ENTEROSCOPY;  Surgeon: Singh Kee III, MD;  Location: [facility];  Service: Endoscopy;  Laterality: N/A;    VARICOSE VEIN SURGERY       Social History     Tobacco Use    Smoking status: Former     Packs/day: 0.35     Years: 44.00     Pack years: 15.40     Types: Cigarettes     Start date: 1970     Smokeless tobacco: Never    Tobacco comments:     1/08/2015   Substance Use Topics    Alcohol use: No     Alcohol/week: 0.0 standard drinks    Drug use: No        OBJECTIVE     VITAL SIGNS (Most Recent)  Temp: 98 °F (36.7 °C) (10/25/22 0808)  Pulse: 85 (10/25/22 0853)  Resp: 18 (10/25/22 0853)  BP: (!) 119/57 (10/25/22 0808)  SpO2: (!) 94 % (10/25/22 0853)    VENTILATION STATUS  Resp: 18 (10/25/22 0853)  SpO2: (!) 94 % (10/25/22 0853)       I & O (Last 24H):  Intake/Output Summary (Last 24 hours) at 10/25/2022 1104  Last data filed at 10/25/2022 0323  Gross per 24 hour   Intake 480 ml   Output 200 ml   Net 280 ml         WEIGHTS  Wt Readings from Last 3 Encounters:   10/25/22 0300 56.9 kg (125 lb 7.1 oz)   10/24/22 0300 54.5 kg (120 lb 2.4 oz)   10/23/22 0320 55.8 kg (123 lb 0.3 oz)   10/22/22 0345 57.6 kg (126 lb 15.8 oz)   10/21/22 0350 57.5 kg (126 lb 12.2 oz)   10/16/22 2233 56.5 kg (124 lb 9 oz)   10/16/22 1621 57.6 kg (127 lb)   10/18/22 1200 56.2 kg (124 lb)   10/12/22 1606 57 kg (125 lb 9.6 oz)       PHYSICAL EXAM  GENERAL: Elderly female in no acute distress on nasal cannula  HEENT: Normocephalic. Pupils normal and conjunctivae normal.  Mucous membranes normal, no cyanosis or icterus, trachea central,no pallor or icterus is noted..   NECK: No JVD. No bruit..   CARDIAC: systolic murmur noted  CHEST ANATOMY: normal.   LUNGS: coarse breathsounds mild rhochi  ABDOMEN: Soft no masses or organomegaly.  No abdomen pulsations or bruits.  Normal bowel sounds. No pulsations and no masses felt, No guarding or rebound.   URINARY: No carter catheter   EXTREMITIES: No cyanosis, clubbing or edema noted at this time., no calf tenderness bilaterally.   PERIPHERAL VASCULAR SYSTEM: Good palpable distal pulses.   CENTRAL NERVOUS SYSTEM: No focal motor or sensory deficits noted.   SKIN: Skin without lesions, moist, well perfused.   MUSCLE STRENGTH & TONE: No noteable weakness, atrophy or abnormal movement.     HOME  MEDICATIONS:  No current facility-administered medications on file prior to encounter.     Current Outpatient Medications on File Prior to Encounter   Medication Sig Dispense Refill    albuterol (PROVENTIL) 2.5 mg /3 mL (0.083 %) nebulizer solution Take 3 mLs (2.5 mg total) by nebulization every 6 (six) hours as needed for Wheezing or Shortness of Breath. USE 1 VIAL IN NEBULIZER EVERY 6 HOURS AS NEEDED FOR WHEEZING. RESCUE 360 each 3    allopurinoL (ZYLOPRIM) 100 MG tablet Take 1 tablet (100 mg total) by mouth once daily. 90 tablet 3    ascorbic acid, vitamin C, (VITAMIN C) 500 MG tablet Take 500 mg by mouth once daily.      calcium carbonate (OS-TASIA) 500 mg calcium (1,250 mg) tablet Take 1 tablet by mouth 2 (two) times daily.      diltiaZEM (CARDIZEM CD) 180 MG 24 hr capsule Take 1 capsule (180 mg total) by mouth once daily. 90 capsule 3    fish oil-omega-3 fatty acids 300-1,000 mg capsule Take 2 g by mouth every evening.      fluticasone-umeclidin-vilanter (TRELEGY ELLIPTA) 200-62.5-25 mcg inhaler Inhale 1 puff into the lungs once daily. 60 each 11    folic acid-vit B6-vit B12 2.5-25-2 mg (FOLBIC) 2.5-25-2 mg Tab Take 1 tablet by mouth once daily.      furosemide (LASIX) 20 MG tablet Take 1 tablet only as needed, for swelling, increase SOB, weight gain of 3 lbs overnight or 5 lbs for the week 30 tablet prn    levothyroxine (SYNTHROID) 88 MCG tablet Take 1 tablet (88 mcg total) by mouth once daily. 90 tablet 3    losartan (COZAAR) 25 MG tablet Take 1 tablet (25 mg total) by mouth once daily. (Patient taking differently: Take 25 mg by mouth once daily. prn) 90 tablet 3    mepolizumab 100 mg/mL AtIn Inject 1 mL (100 mg total) into the skin every 28 days. 1 mL 12    montelukast (SINGULAIR) 10 mg tablet TAKE 1 TABLET BY MOUTH ONCE DAILY IN THE EVENING (Patient taking differently: Take 10 mg by mouth every evening.) 90 tablet 3    multivitamin (THERAGRAN) tablet Take 1 tablet by mouth once daily.      omeprazole  (PRILOSEC) 40 MG capsule Take 1 capsule (40 mg total) by mouth once daily. 90 capsule 3    rosuvastatin (CRESTOR) 20 MG tablet Take 1 tablet (20 mg total) by mouth once daily. 90 tablet 3    sertraline (ZOLOFT) 50 MG tablet Take 1 tablet (50 mg total) by mouth once daily. 90 tablet 1    simethicone 180 mg Cap Take 1 capsule by mouth 3 (three) times daily as needed.      acetaminophen (TYLENOL) 325 MG tablet Take 325 mg by mouth every 6 (six) hours as needed for Pain.      ciclopirox (PENLAC) 8 % Soln Apply topically nightly. 6.6 mL 3    ferrous sulfate (FEOSOL) 325 mg (65 mg iron) Tab tablet Take 325 mg by mouth once daily.      polyethylene glycol (GLYCOLAX) 17 gram/dose powder Take 17 g by mouth 2 (two) times daily. 850 g 3    [DISCONTINUED] clobetasol 0.05% (TEMOVATE) 0.05 % Oint Apply topically 2 (two) times daily. 45 g 1    [DISCONTINUED] diclofenac sodium (VOLTAREN) 1 % Gel Apply 2 g topically once daily. (Patient taking differently: Apply 2 g topically as needed.) 100 g prn    [DISCONTINUED] ketoconazole (NIZORAL) 2 % shampoo Apply topically twice a week. (Patient taking differently: Apply 1 application topically twice a week.) 120 mL 2    [DISCONTINUED] meclizine (ANTIVERT) 25 mg tablet Take 1 tablet (25 mg total) by mouth 3 (three) times daily as needed. 20 tablet prn    [DISCONTINUED] nitroGLYCERIN (NITROSTAT) 0.4 MG SL tablet Place 1 tablet (0.4 mg total) under the tongue every 5 (five) minutes as needed for Chest pain. As needed (Patient taking differently: Place 0.4 mg under the tongue every 5 (five) minutes as needed for Chest pain. Seek medical help if pain is not relieved after the third dose.) 30 tablet 3    [DISCONTINUED] nystatin (MYCOSTATIN) powder Apply topically 4 (four) times daily. 60 g 1       SCHEDULED MEDS:   ascorbic acid (vitamin C)  500 mg Oral Daily    aspirin  81 mg Oral Daily    atorvastatin  40 mg Oral Daily    diltiaZEM  180 mg Oral Daily    docusate sodium  100 mg Oral Daily     enoxparin  1 mg/kg Subcutaneous Q24H    furosemide (LASIX) injection  20 mg Intravenous Daily    isosorbide mononitrate  30 mg Oral Daily    lactulose  20 g Oral TID    levothyroxine  88 mcg Oral Before breakfast    losartan  25 mg Oral Daily    montelukast  10 mg Oral QHS    NON FORMULARY MEDICATION 1 puff  1 puff Inhalation Daily    polyethylene glycol  17 g Oral Daily    predniSONE  20 mg Oral Daily    sertraline  50 mg Oral Daily       CONTINUOUS INFUSIONS:    PRN MEDS:albuterol-ipratropium, dextrose 10%, dextrose 10%, LORazepam, melatonin, ondansetron, polyethylene glycol, sodium chloride 0.9%, sodium chloride 0.9%    LABS AND DIAGNOSTICS     CBC LAST 3 DAYS  Recent Labs   Lab 10/19/22  0405 10/20/22  0524 10/22/22  0421 10/23/22  0419 10/24/22  0500   WBC 8.02 7.78 10.10 6.88 6.37   RBC 2.44* 2.64* 2.71* 2.57* 2.64*   HGB 7.8* 8.4* 8.6* 8.4* 8.5*   HCT 24.8* 26.9* 27.5* 26.3* 27.1*   * 102* 102* 102* 103*   MCH 32.0* 31.8* 31.7* 32.7* 32.2*   MCHC 31.5* 31.2* 31.3* 31.9* 31.4*   RDW 13.9 14.1 14.0 14.2 13.9    176 209 166 167   MPV 11.4 11.6 12.3 11.5 11.7   GRAN 71.3  5.7 72.7  5.7  --   --   --    LYMPH 16.8*  1.4 14.4*  1.1  --   --   --    MONO 10.7  0.9 11.2  0.9  --   --   --    BASO 0.02 0.03  --   --   --    NRBC 0 0  --   --   --          COAGULATION LAST 3 DAYS  No results for input(s): LABPT, INR, APTT in the last 168 hours.    CHEMISTRY LAST 3 DAYS  Recent Labs   Lab 10/23/22  0419 10/24/22  0500 10/25/22  0509    138 136   K 5.5* 5.2* 5.1    103 100   CO2 29 26 26   ANIONGAP 9 9 10   BUN 59* 60* 69*   CREATININE 1.9* 1.8* 2.2*   * 162* 107   CALCIUM 8.5* 8.2* 8.3*   MG  --  2.5  --    ALBUMIN 2.7* 2.7* 2.7*   PROT 5.6* 5.4* 5.5*   ALKPHOS 59 63 59   ALT 26 26 27   AST 35 33 30   BILITOT 0.5 0.6 0.6         CARDIAC PROFILE LAST 3 DAYS  Recent Labs   Lab 10/19/22  1745 10/23/22  0419 10/24/22  2226 10/25/22  0913   BNP 3,449* >4,500*  --  >4,500*   TROPONINI  1.370* 0.790* 0.752*  --          ENDOCRINE LAST 3 DAYS  No results for input(s): TSH, PROCAL in the last 168 hours.    LAST ARTERIAL BLOOD GAS  ABG  No results for input(s): PH, PO2, PCO2, HCO3, BE in the last 168 hours.    LAST 7 DAYS MICROBIOLOGY   Microbiology Results (last 7 days)       Procedure Component Value Units Date/Time    Blood culture x two cultures. Draw prior to antibiotics. [[id number]] Collected: 10/16/22 1945    Order Status: Completed Specimen: Blood from Peripheral, Antecubital, Right Updated: 10/21/22 2032     Blood Culture, Routine No growth after 5 days.    Narrative:      Aerobic and anaerobic    Blood culture x two cultures. Draw prior to antibiotics. [[id number]] Collected: 10/16/22 1840    Order Status: Completed Specimen: Blood from Peripheral, Hand, Right Updated: 10/21/22 2032     Blood Culture, Routine No growth after 5 days.    Narrative:      Aerobic and anaerobic            MOST RECENT IMAGING  X-Ray Abdomen AP 1 View  KUB    CLINICAL DATA: Constipation    FINDINGS: Comparison to October 22. Mild gaseous distention of small bowel loops throughout the abdomen is stable. Intraluminal gas is noted at the level of the rectum. No mass, suspicious calcification, or free air is identified. No acute osseous abnormality is demonstrated. There is multilevel lumbar degenerative disc disease. Right upper quadrant surgical clips are noted, with postoperative changes at both hips.    IMPRESSION:  1. Mild gaseous distention of small bowel loops throughout the abdomen suggesting ileus.  2. No other significant findings.    Electronically signed by:  Ariel Barr MD  10/24/2022 6:50 AM CDT Workstation: [id number]  X-Ray Chest AP Portable  Chest single view    CLINICAL DATA: Shortness of breath    FINDINGS: Comparison to October 22. Cardiomegaly is stable. The aortic arch is calcified. The lungs are hyperinflated. Mild bilateral interstitial prominence may reflect mild interstitial edema,  improved. Stable small left pleural effusion.    IMPRESSION:  1. Cardiomegaly with findings of probable mild interstitial edema, improved.  2. Stable small left pleural effusion.  3. Pulmonary hyperinflation.    Electronically signed by:  Ariel Barr MD  10/24/2022 6:47 AM CDT Workstation: [id number]      ECHOCARDIOGRAM RESULTS (last 5)  Results for orders placed during the hospital encounter of 10/16/22    Echo    Interpretation Summary  · Moderate concentric hypertrophy and mildly decreased systolic function.  · The estimated ejection fraction is 45%.  · There are segmental left ventricular wall motion abnormalities. Anterior apical akinesia  · Moderate to severe tricuspid regurgitation.  · Moderate-to-severe mitral regurgitation.  · There is moderate to severe pulmonary hypertension.  · Severe left atrial enlargement.  · Grade II left ventricular diastolic dysfunction.  · Atrial fibrillation not observed.  · There is right ventricular hypertrophy.  · There is mild mitral stenosis. Mitral valve not well seen because of heavy mitral annular calcification      Results for orders placed during the hospital encounter of 03/21/22    Echo    Interpretation Summary  · The estimated PA systolic pressure is 43 mmHg.  · The left ventricle is normal in size with mild concentric hypertrophy and normal systolic function.  · The estimated ejection fraction is 65%.  · Grade II left ventricular diastolic dysfunction.  · Normal right ventricular size with normal right ventricular systolic function.  · Moderate mitral regurgitation.  · There is moderate mitral stenosis.  · There is moderate mitral leaflet calcification.  · Mild tricuspid regurgitation.  · There is mild pulmonary hypertension.  · There is mild aortic valve stenosis.  · Aortic valve area is 1.77 cm2; peak velocity is 2.08 m/s; mean gradient is 11 mmHg.  · Moderate left atrial enlargement.  · Mild right atrial enlargement.      Results for orders placed during  the hospital encounter of 10/19/21    Echo    Interpretation Summary  · The estimated PA systolic pressure is 44 mmHg.  · The left ventricle is normal in size with severe concentric hypertrophy and normal systolic function.  · The estimated ejection fraction is 60%.  · Grade II left ventricular diastolic dysfunction.  · Mild right ventricular enlargement.  · Mild left atrial enlargement.  · Mild to moderate tricuspid regurgitation.  · Mild-to-moderate mitral regurgitation.  · Normal central venous pressure (3 mmHg).      Results for orders placed during the hospital encounter of 05/07/20    Echo Color Flow Doppler? Yes; Bubble Contrast? Yes    Interpretation Summary  · Concentric left ventricular hypertrophy.  · The mean diastolic gradient across the mitral valve is 5 mmHg at a heart rate of 57 bpm.  · Normal left ventricular systolic function. The estimated ejection fraction is 65%.  · Grade II (moderate) left ventricular diastolic dysfunction consistent with pseudonormalization.  · Normal right ventricular systolic function.  · Severe left atrial enlargement.  · Mild right atrial enlargement.  · Mild mitral regurgitation.  · Mild to moderate tricuspid regurgitation.  · Normal central venous pressure (3 mmHg).  · The estimated PA systolic pressure is 50 mmHg.  · Pulmonary hypertension present.  · No PFO on color flow or saline bubble study      CURRENT/PREVIOUS VISIT EKG  Results for orders placed or performed during the hospital encounter of 10/16/22   EKG 12-lead    Collection Time: 10/24/22  9:45 PM    Narrative    Test Reason : R07.9,    Vent. Rate : 082 BPM     Atrial Rate : 082 BPM     P-R Int : 158 ms          QRS Dur : 092 ms      QT Int : 406 ms       P-R-T Axes : 034 -30 073 degrees     QTc Int : 474 ms    Normal sinus rhythm  Left axis deviation  Anteroseptal infarct (cited on or before 20-JUL-2022)  Abnormal ECG  When compared with ECG of 16-OCT-2022 18:27,  Non-specific change in ST segment in Inferior  leads  ST elevation now present in Anterior leads  Nonspecific T wave abnormality no longer evident in Inferior leads  T wave inversion no longer evident in Anterior leads    Referred By: [email or web address]   SELF           Confirmed By:            ASSESSMENT/PLAN:     Active Hospital Problems    Diagnosis    *Pneumonia    Acute on chronic combined systolic and diastolic heart failure       ASSESSMENT & PLAN:     Acute on Chronic CHFrEF- Improving  Pneumonia  Moderate TR  Moderate MR and stenosis and LV dysfunction with evidence of apical scarring  Apical Hypokinesis  PH  Anemia- hgb 8.5  SHALINI on CKD    RECOMMENDATIONS:    Start albumin 12.5 g IV daily. Continue gentle diuresis as able.   Add JIMMY hose.   OOB to chair and up moving as able.   Strict I&O.   Likely need to involve renal for further guidance with SHALINI. Hold ACEI/ARB for now.   Titrate steroids down as able.   Will follow.       Domonique Ku NP  [facility]  Department of Cardiology  Date of Service: 10/25/2022

## 2022-10-25 NOTE — PROGRESS NOTES
[facility] Medicine  Progress Note    Patient name: Blaire Cage  MRN: [id number]  Admit Date: 10/16/2022   LOS: 7 days     SUBJECTIVE:     Principal problem: Pneumonia    Interval History:   Resting comfortably. BNP still greater than 4500.  Renal function bumped with IV lasix and thus I have consulted Nephrology. She had more BMs today.  I'm not sure about this clinical picture- could a PE be driving any of this?  Cannot do a CTA chest due to renal function. VQ scan will be abnormal as CXR is abnormal. LE US of the legs ordered and were negative for DVT.    Hospital Course:     I, Dr. Ramsey, have assumed care 10/18. Patient admitted with suspected pneumonia and started on IV abx. Patient transferred to step down unit overnight after developing respiratory distress with reports of difficulty weaning her from bipap.  Being treated for pneumonia with IV abx. Repeat CXR with unchanged appearing RLL opacity.  Her BNP is elevated at 2653 on admission which is significantly higher than her previous (278 on 3/22).  She was started on IV lasix given concern for HF. She has only put out 900 at the time of this note writing. Troponin mildly elevated but static.  Echo ordered and EF 45% with segmental LV wall motion abnormalities and anterior apical akinesia which appears much different from her 3/2022 echo. Echo also reveals mod to severe TR, mod to severe MR and mod to severe pulm HTN which appears worse from prior. Cardiology consulted and has evaluated the patient. Imdur added and recommend changing oral diltiazem to a beta blocker when lungs are improved. Bipap able to be weaned.  Supplemental oxygen able to be weaned down.  10/20 She appears to be dry. CXR improved.  Small bump in Cr and BUN and thus stopping IV lasix.  She has not had a BM for several days. She received an enema without producing a BM and had what sounds like a near syncope event after enema. KUB ordered and reveals possible  ileus of SBO. She tells me she is passing gas. Repeat imaging 10/22 with non obstructive bowel gas pattern. 10/23  BNP re checked and continues to rise. I discussed with Cardiology and IV lasix added back. Lokelma x 1 given for the hyperkalemia.  Enema given and she had a moderate sized BM!    Scheduled Meds:   albumin human 25%  12.5 g Intravenous Daily    ascorbic acid (vitamin C)  500 mg Oral Daily    aspirin  81 mg Oral Daily    atorvastatin  40 mg Oral Daily    diltiaZEM  180 mg Oral Daily    docusate sodium  100 mg Oral Daily    enoxparin  1 mg/kg Subcutaneous Q24H    furosemide (LASIX) injection  20 mg Intravenous Daily    isosorbide mononitrate  30 mg Oral Daily    lactulose  20 g Oral TID    levothyroxine  88 mcg Oral Before breakfast    montelukast  10 mg Oral QHS    NON FORMULARY MEDICATION 1 puff  1 puff Inhalation Daily    polyethylene glycol  17 g Oral Daily    predniSONE  20 mg Oral Daily    sertraline  50 mg Oral Daily     Continuous Infusions:  PRN Meds:albuterol-ipratropium, dextrose 10%, dextrose 10%, LORazepam, melatonin, ondansetron, polyethylene glycol, sodium chloride 0.9%, sodium chloride 0.9%    Review of patient's allergies indicates:   Allergen Reactions    Carvedilol Other (See Comments)     Bradycardia and syncope    Boniva [ibandronate]     Codeine     Hydralazine analogues     Iodinated contrast media Hives    Kionex [sodium polystyrene sulfonate] Diarrhea     From encounter 12/11/17 for diarrhea--not true allergy.    Lisinopril     Morphine        Review of Systems: As per interval history    OBJECTIVE:     Vital Signs (Most Recent)  Temp: 97.7 °F (36.5 °C) (10/25/22 1642)  Pulse: 75 (10/25/22 1642)  Resp: 16 (10/25/22 1642)  BP: (!) 116/57 (10/25/22 1642)  SpO2: 98 % (10/25/22 1642)    Vital Signs Range (Last 24H):  Temp:  [97.2 °F (36.2 °C)-98.5 °F (36.9 °C)]   Pulse:  [72-85]   Resp:  [14-18]   BP: (116-129)/(56-64)   SpO2:  [94 %-99 %]     I & O (Last 24H):  Intake/Output Summary  (Last 24 hours) at 10/25/2022 1806  Last data filed at 10/25/2022 0323  Gross per 24 hour   Intake --   Output 200 ml   Net -200 ml         Physical Exam:    Vitals and nursing note reviewed.     Constitutional:       General: Not in acute distress.     Appearance: Well-developed.   HENT:      Head: Normocephalic and atraumatic.   Eyes:      Pupils: Pupils are equal, round, and reactive to light.   Cardiovascular:      Rate and Rhythm: Regular rhythm.   No LE edema  Pulmonary:      Effort: Pulmonary effort is normal.      Breath sounds: Normal breath sounds. Mild bilateral expiratory wheeze.  Small, shallow breaths.  O2 per NC  Abdominal:      General: There is no distension.      Palpations: Abdomen is full     Tenderness: There is no abdominal tenderness. There is no guarding or rebound.   Musculoskeletal:         General: Normal range of motion.      Cervical back: Normal range of motion.   Skin:     Findings: No rash.   Neurological:      Mental Status: Alert and oriented to person, place, and time.      Cranial Nerves: No cranial nerve deficit.      Sensory: No sensory deficit.     Laboratory:  I have reviewed all pertinent lab results within the past 24 hours.  CBC:   Recent Labs   Lab 10/24/22  0500   WBC 6.37   RBC 2.64*   HGB 8.5*   HCT 27.1*      *   MCH 32.2*   MCHC 31.4*       CMP:   Recent Labs   Lab 10/25/22  0509      CALCIUM 8.3*   ALBUMIN 2.7*   PROT 5.5*      K 5.1   CO2 26      BUN 69*   CREATININE 2.2*   ALKPHOS 59   ALT 27   AST 30   BILITOT 0.6       Cardiac markers:   Recent Labs   Lab 10/24/22  2226   TROPONINI 0.752*       Microbiology Results (last 7 days)       Procedure Component Value Units Date/Time    Blood culture x two cultures. Draw prior to antibiotics. [[id number]] Collected: 10/16/22 1945    Order Status: Completed Specimen: Blood from Peripheral, Antecubital, Right Updated: 10/21/22 2032     Blood Culture, Routine No growth after 5 days.     Narrative:      Aerobic and anaerobic    Blood culture x two cultures. Draw prior to antibiotics. [[id number]] Collected: 10/16/22 1840    Order Status: Completed Specimen: Blood from Peripheral, Hand, Right Updated: 10/21/22 2032     Blood Culture, Routine No growth after 5 days.    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         Plan:     -New ileus versus bowel obstruction versus constipation.  Colace, Miralax, Lactulose.  NGT if starts to vomit. She needs to have a bowel movement and ileus/obstruction be definitively ruled out prior to discharge. Abd xray somewhat improved 10/22 but still with dilated loops of bowel. Enema ordered and finally successful bowel movement 10/23.  Continuing bowel regimen.   -empiric tx with IV abx as CXR appears more pneumonia than pulmonary edema given the asymetry. I agree with  pulmonary and will commit patient to five day course of abx- completed 10/21.  -Elevated BNP and new echo findings are more convincing of acute HF, however.  Started IV lasix and Cardiology consulted. Renal function climbing and thus I have put lasix on hold as CXR is now improved and no volume overload on exam. 10/23 Restarted Lasix per cardiology as BNP continues to rise.  10/25 Nephrology consulted as bump in renal function and BNP continues to be very high.  Albumin added per Cardiology to assist in diuresis.  -Her volume status seemingly improved from admission but her BNP continues to be very high.  Could some other process be driving this?  PE?  LE US ordered and negative for DVT.  Cannot do a CTA chest due to renal function. VQ scan not helpful at this time due to abnormal  CXR.  -Imdur added per Cardiology  -Prednisone added for wheezing  -CXR as needed.  Has been stable with small effusions and atelectasis.   -Strict ins and outs  -Tele monitoring  -Attempting to wean her off supplemental oxygen during the day per her baseline.  -PT/OT  -HH ordered for discharge      VTE Risk Mitigation (From admission, onward)           Ordered     enoxaparin injection 60 mg  Every 24 hours (non-standard times)         10/18/22 1102     IP VTE LOW RISK PATIENT  Once         10/16/22 2046     Place sequential compression device  Until discontinued         10/16/22 2046                      Department Hospital Medicine  [facility]  Joey Ramsey MD  Date of service: 10/25/2022

## 2022-10-25 NOTE — PLAN OF CARE
10/25/22 0853   Patient Assessment/Suction   Level of Consciousness (AVPU) alert   Respiratory Effort Normal;Unlabored   All Lung Fields Breath Sounds coarse;wheezes, expiratory   Skin Integrity   $ Wound Care Tech Time 15 min   Area Observed Bridge of nose   Skin Appearance without discoloration   PRE-TX-O2   O2 Device (Oxygen Therapy) nasal cannula   $ Is the patient on Low Flow Oxygen? Yes   Flow (L/min) 3   SpO2 (!) 94 %   Pulse Oximetry Type Intermittent   $ Pulse Oximetry - Multiple Charge Pulse Oximetry - Multiple   Pulse 85   Resp 18   Aerosol Therapy   $ Aerosol Therapy Charges Aerosol Treatment   Daily Review of Necessity (SVN) completed   Respiratory Treatment Status (SVN) given   Treatment Route (SVN) oxygen;mouthpiece   Patient Position (SVN) Shetty's   Post Treatment Assessment (SVN) breath sounds improved   Signs of Intolerance (SVN) none   Inhaler   $ Inhaler Charges MDI (Metered Dose Inahler) Treatment   Daily Review of Necessity (Inhaler) completed   Respiratory Treatment Status (Inhaler) given;mouth rinsed post treatment   Treatment Route (Inhaler) mouthpiece   Patient Position (Inhaler) Shetty's   Post Treatment Assessment (Inhaler) breath sounds unchanged   Signs of Intolerance (Inhaler) none   Preset CPAP/BiPAP Settings   Mode Of Delivery Standby   $ CPAP/BiPAP Daily Charge BiPAP/CPAP Daily   Sized Appropriately? Yes   Equipment Type V60   Ipap 10   EPAP (cm H2O) 5   Pressure Support (cm H2O) 5   Set Rate (Breaths/Min) 16   Education   $ Education Bronchodilator;BiPAP;15 min   Respiratory Evaluation   $ Care Plan Tech Time 15 min

## 2022-10-25 NOTE — PROGRESS NOTES
[facility] Medicine  Progress Note    Patient name: Blaire Cage  MRN: [id number]  Admit Date: 10/16/2022   LOS: 6 days     SUBJECTIVE:     Principal problem: Pneumonia    Interval History:   Felt SOB while in bed this AM but feeling much better in the chair sitting up.  Abd distension is better. Passing gas.  Tolerating diet. Cardiology recommends continuing diuresis.     Hospital Course:     I, Dr. Ramsey, have assumed care 10/18. Patient admitted with suspected pneumonia and started on IV abx. Patient transferred to step down unit overnight after developing respiratory distress with reports of difficulty weaning her from bipap.  Being treated for pneumonia with IV abx. Repeat CXR with unchanged appearing RLL opacity.  Her BNP is elevated at 2653 on admission which is significantly higher than her previous (278 on 3/22).  She was started on IV lasix given concern for HF. She has only put out 900 at the time of this note writing. Troponin mildly elevated but static.  Echo ordered and EF 45% with segmental LV wall motion abnormalities and anterior apical akinesia which appears much different from her 3/2022 echo. Echo also reveals mod to severe TR, mod to severe MR and mod to severe pulm HTN which appears worse from prior. Cardiology consulted and has evaluated the patient. Imdur added and recommend changing oral diltiazem to a beta blocker when lungs are improved. Bipap able to be weaned.  Supplemental oxygen able to be weaned down.  10/20 She appears to be dry. CXR improved.  Small bump in Cr and BUN and thus stopping IV lasix.  She has not had a BM for several days. She received an enema without producing a BM and had what sounds like a near syncope event after enema. KUB ordered and reveals possible ileus of SBO. She tells me she is passing gas. Repeat imaging 10/22 with non obstructive bowel gas pattern. 10/23  BNP re checked and continues to rise. I discussed with Cardiology and IV  lasix added back. Lokelma x 1 given for the hyperkalemia.  Enema given and she had a moderate sized BM!    Scheduled Meds:   ascorbic acid (vitamin C)  500 mg Oral Daily    aspirin  81 mg Oral Daily    atorvastatin  40 mg Oral Daily    diltiaZEM  180 mg Oral Daily    docusate sodium  100 mg Oral Daily    enoxparin  1 mg/kg Subcutaneous Q24H    furosemide (LASIX) injection  20 mg Intravenous Daily    isosorbide mononitrate  30 mg Oral Daily    lactulose  20 g Oral TID    levothyroxine  88 mcg Oral Before breakfast    losartan  25 mg Oral Daily    montelukast  10 mg Oral QHS    NON FORMULARY MEDICATION 1 puff  1 puff Inhalation Daily    polyethylene glycol  17 g Oral Daily    predniSONE  20 mg Oral Daily    sertraline  50 mg Oral Daily     Continuous Infusions:  PRN Meds:albuterol-ipratropium, dextrose 10%, dextrose 10%, LORazepam, melatonin, ondansetron, polyethylene glycol, sodium chloride 0.9%, sodium chloride 0.9%    Review of patient's allergies indicates:   Allergen Reactions    Carvedilol Other (See Comments)     Bradycardia and syncope    Boniva [ibandronate]     Codeine     Hydralazine analogues     Iodinated contrast media Hives    Kionex [sodium polystyrene sulfonate] Diarrhea     From encounter 12/11/17 for diarrhea--not true allergy.    Lisinopril     Morphine        Review of Systems: As per interval history    OBJECTIVE:     Vital Signs (Most Recent)  Temp: 97.6 °F (36.4 °C) (10/24/22 1630)  Pulse: 70 (10/24/22 1630)  Resp: 18 (10/24/22 1630)  BP: (!) 119/59 (10/24/22 1630)  SpO2: 99 % (10/24/22 1630)    Vital Signs Range (Last 24H):  Temp:  [97.3 °F (36.3 °C)-98.6 °F (37 °C)]   Pulse:  [70-85]   Resp:  [15-18]   BP: (116-140)/(58-79)   SpO2:  [94 %-99 %]     I & O (Last 24H):  Intake/Output Summary (Last 24 hours) at 10/24/2022 1910  Last data filed at 10/24/2022 1800  Gross per 24 hour   Intake 480 ml   Output --   Net 480 ml         Physical Exam:    Vitals and nursing note reviewed.      Constitutional:       General: Not in acute distress.     Appearance: Well-developed.   HENT:      Head: Normocephalic and atraumatic.   Eyes:      Pupils: Pupils are equal, round, and reactive to light.   Cardiovascular:      Rate and Rhythm: Regular rhythm.   No LE edema  Pulmonary:      Effort: Pulmonary effort is normal.      Breath sounds: Normal breath sounds. Mild bilateral expiratory wheeze.  Small, shallow breaths.  O2 per NC  Abdominal:      General: There is no distension.      Palpations: Abdomen is full     Tenderness: There is no abdominal tenderness. There is no guarding or rebound.   Musculoskeletal:         General: Normal range of motion.      Cervical back: Normal range of motion.   Skin:     Findings: No rash.   Neurological:      Mental Status: Alert and oriented to person, place, and time.      Cranial Nerves: No cranial nerve deficit.      Sensory: No sensory deficit.     Laboratory:  I have reviewed all pertinent lab results within the past 24 hours.  CBC:   Recent Labs   Lab 10/24/22  0500   WBC 6.37   RBC 2.64*   HGB 8.5*   HCT 27.1*      *   MCH 32.2*   MCHC 31.4*       CMP:   Recent Labs   Lab 10/24/22  0500   *   CALCIUM 8.2*   ALBUMIN 2.7*   PROT 5.4*      K 5.2*   CO2 26      BUN 60*   CREATININE 1.8*   ALKPHOS 63   ALT 26   AST 33   BILITOT 0.6       Cardiac markers:   Recent Labs   Lab 10/23/22  0419   TROPONINI 0.790*       Microbiology Results (last 7 days)       Procedure Component Value Units Date/Time    Blood culture x two cultures. Draw prior to antibiotics. [[id number]] Collected: 10/16/22 1945    Order Status: Completed Specimen: Blood from Peripheral, Antecubital, Right Updated: 10/21/22 2032     Blood Culture, Routine No growth after 5 days.    Narrative:      Aerobic and anaerobic    Blood culture x two cultures. Draw prior to antibiotics. [[id number]] Collected: 10/16/22 1840    Order Status: Completed Specimen: Blood from Peripheral,  Hand, Right Updated: 10/21/22 2032     Blood Culture, Routine No growth after 5 days.    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         Plan:     -New ileus versus bowel obstruction versus constipation.  Colace, Miralax, Lactulose.  NGT if starts to vomit. She needs to have a bowel movement and ileus/obstruction be definitively ruled out prior to discharge. Abd xray somewhat improved 10/22 but still with dilated loops of bowel. Enema ordered and finally successful bowel movement 10/23.  -empiric tx with IV abx as CXR appears more pneumonia than pulmonary edema given the asymetry. I agree with  pulmonary and will commit patient to five day course of abx- completed 10/21.  -Elevated BNP and new echo findings are more convincing of acute HF, however.  Started IV lasix and Cardiology consulted. Renal function climbing and thus I have put lasix on hold as CXR is now improved and no volume overload on exam. 10/23 Restarted Lasix per cardiology as BNP continues to rise.  Monitoring renal tolerance.  -Imdur added per Cardiology  -Prednisone added for wheezing  -CXR as needed.  Has been stable with small effusions and atelectasis.   -Strict ins and outs  -Tele monitoring  -Attempting to wean her off supplemental oxygen during the day per her baseline.  -PT/OT  -HH ordered for discharge      VTE Risk Mitigation (From admission, onward)           Ordered     enoxaparin injection 60 mg  Every 24 hours (non-standard times)         10/18/22 1102     IP VTE LOW RISK PATIENT  Once         10/16/22 2046     Place sequential compression device  Until discontinued         10/16/22 2046                       Department Hospital Medicine  [facility]  Joey Ramsey MD  Date of service: 10/24/2022

## 2022-10-26 NOTE — PROGRESS NOTES
[facility] Medicine  Progress Note    Patient name: Blaire Cage  MRN: [id number]  Admit Date: 10/16/2022   LOS: 8 days     SUBJECTIVE:     Principal problem: Pneumonia    Interval History:   Patient required bipap to sleep overnight due to SOB. Not able to get out of bed today due to abdominal pain.  Passing gas and good bowel sounds.  RN supplements liquid stool yesterday but none today.  She is only drinking glucerna and not eating well.  She is quite weak per RN.  I'm afraid she is dwindling.  With her permission, I called her daughter Ramonita and left a voicemail.  Continuing current care and will re evaluate tomorrow.    Hospital Course:     I, Dr. Ramsey, have assumed care 10/18. Patient admitted with suspected pneumonia and started on IV abx. Patient transferred to step down unit overnight after developing respiratory distress with reports of difficulty weaning her from bipap.  Being treated for pneumonia with IV abx. Repeat CXR with unchanged appearing RLL opacity.  Her BNP is elevated at 2653 on admission which is significantly higher than her previous (278 on 3/22).  She was started on IV lasix given concern for HF. She has only put out 900 at the time of this note writing. Troponin mildly elevated but static.  Echo ordered and EF 45% with segmental LV wall motion abnormalities and anterior apical akinesia which appears much different from her 3/2022 echo. Echo also reveals mod to severe TR, mod to severe MR and mod to severe pulm HTN which appears worse from prior. Cardiology consulted and has evaluated the patient. Imdur added and recommend changing oral diltiazem to a beta blocker when lungs are improved. Bipap able to be weaned.  Supplemental oxygen able to be weaned down.  10/20 She appears to be dry. CXR improved.  Small bump in Cr and BUN and thus stopping IV lasix.  She has not had a BM for several days. She received an enema without producing a BM and had what sounds like  a near syncope event after enema. KUB ordered and reveals possible ileus of SBO. She tells me she is passing gas. Repeat imaging 10/22 with non obstructive bowel gas pattern. 10/23  BNP re checked and continues to rise. I discussed with Cardiology and IV lasix added back. Lokelma x 1 given for the hyperkalemia.  Enema given and she had a moderate sized BM!  10/25 BNP still greater than 4500.  Renal function bumped with IV lasix and thus I have consulted Nephrology. She had more BMs today.  I'm not sure about this clinical picture- could a PE be driving any of this?  Cannot do a CTA chest due to renal function. VQ scan will be abnormal as CXR is abnormal. LE US of the legs ordered and were negative for DVT    Scheduled Meds:   albumin human 25%  12.5 g Intravenous Daily    ascorbic acid (vitamin C)  500 mg Oral Daily    aspirin  81 mg Oral Daily    atorvastatin  40 mg Oral Daily    diltiaZEM  180 mg Oral Daily    docusate sodium  100 mg Oral Daily    enoxparin  1 mg/kg Subcutaneous Q24H    [START ON 10/27/2022] fluticasone-umeclidin-vilanter  1 puff Inhalation Daily    furosemide (LASIX) injection  20 mg Intravenous Daily    isosorbide mononitrate  30 mg Oral Daily    lactulose  20 g Oral TID    levothyroxine  88 mcg Oral Before breakfast    montelukast  10 mg Oral QHS    polyethylene glycol  17 g Oral Daily    predniSONE  20 mg Oral Daily    sertraline  50 mg Oral Daily     Continuous Infusions:  PRN Meds:albuterol-ipratropium, dextrose 10%, dextrose 10%, LORazepam, melatonin, ondansetron, polyethylene glycol, sodium chloride 0.9%, sodium chloride 0.9%    Review of patient's allergies indicates:   Allergen Reactions    Carvedilol Other (See Comments)     Bradycardia and syncope    Boniva [ibandronate]     Codeine     Hydralazine analogues     Iodinated contrast media Hives    Kionex [sodium polystyrene sulfonate] Diarrhea     From encounter 12/11/17 for diarrhea--not true allergy.    Lisinopril     Morphine         Review of Systems: As per interval history    OBJECTIVE:     Vital Signs (Most Recent)  Temp: 97.9 °F (36.6 °C) (10/26/22 1641)  Pulse: 79 (10/26/22 1641)  Resp: 20 (10/26/22 1641)  BP: 115/66 (10/26/22 1641)  SpO2: 99 % (10/26/22 1641)    Vital Signs Range (Last 24H):  Temp:  [96.7 °F (35.9 °C)-97.9 °F (36.6 °C)]   Pulse:  [73-91]   Resp:  [16-22]   BP: (111-131)/(53-72)   SpO2:  [93 %-100 %]     I & O (Last 24H):  Intake/Output Summary (Last 24 hours) at 10/26/2022 1832  Last data filed at 10/26/2022 1641  Gross per 24 hour   Intake 390 ml   Output 100 ml   Net 290 ml         Physical Exam:    Vitals and nursing note reviewed.     Constitutional:       General: Not in acute distress.     Appearance: Well-developed.   HENT:      Head: Normocephalic and atraumatic.   Eyes:      Pupils: Pupils are equal, round, and reactive to light.   Cardiovascular:      Rate and Rhythm: Regular rhythm.   No LE edema  Pulmonary:      Effort: Pulmonary effort is normal.      Breath sounds: Normal breath sounds. Mild bilateral expiratory wheeze.  Small, shallow breaths.  O2 per NC  Abdominal:      General: There is no distension.      Palpations: Abdomen is full     Tenderness: There is no abdominal tenderness. There is no guarding or rebound.   Musculoskeletal:         General: Normal range of motion.      Cervical back: Normal range of motion.   Skin:     Findings: No rash.   Neurological:      Mental Status: Alert and oriented to person, place, and time.      Cranial Nerves: No cranial nerve deficit.      Sensory: No sensory deficit.     Laboratory:  I have reviewed all pertinent lab results within the past 24 hours.  CBC:   Recent Labs   Lab 10/24/22  0500   WBC 6.37   RBC 2.64*   HGB 8.5*   HCT 27.1*      *   MCH 32.2*   MCHC 31.4*       CMP:   Recent Labs   Lab 10/26/22  0515      CALCIUM 8.4*   ALBUMIN 3.2*   PROT 5.8*      K 5.1   CO2 26      BUN 78*   CREATININE 2.3*   ALKPHOS 59   ALT  29   AST 32   BILITOT 0.6       Cardiac markers:   Recent Labs   Lab 10/24/22  2226   TROPONINI 0.752*       Microbiology Results (last 7 days)       Procedure Component Value Units Date/Time    Blood culture x two cultures. Draw prior to antibiotics. [[id number]] Collected: 10/16/22 1945    Order Status: Completed Specimen: Blood from Peripheral, Antecubital, Right Updated: 10/21/22 2032     Blood Culture, Routine No growth after 5 days.    Narrative:      Aerobic and anaerobic    Blood culture x two cultures. Draw prior to antibiotics. [[id number]] Collected: 10/16/22 1840    Order Status: Completed Specimen: Blood from Peripheral, Hand, Right Updated: 10/21/22 2032     Blood Culture, Routine No growth after 5 days.    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         Plan:     -I'm afraid she is dwindling and not getting better.  I have left a message for her daughter to update family.  -New ileus versus bowel obstruction versus constipation.  Colace, Miralax, Lactulose.  NGT if starts to vomit. She needs to have a bowel movement and ileus/obstruction be definitively ruled out prior to discharge. Abd xray somewhat improved 10/22 but still with dilated loops of bowel. Enema ordered and finally successful bowel movement 10/23.  Continuing bowel regimen.   -empiric tx with IV abx as CXR appears more pneumonia than pulmonary edema given the asymetry. I agree with  pulmonary and will commit patient to five day course of abx- completed 10/21.  -Elevated BNP and new echo findings are more convincing of acute HF, however.  Started IV lasix and Cardiology consulted. Renal function climbing and thus I have  put lasix on hold as CXR is now improved and no volume overload on exam. 10/23 Restarted Lasix per cardiology as BNP continues to rise.  10/25 Nephrology consulted as bump in renal function and BNP continues to be very high.  Albumin added per Cardiology to assist in diuresis.  -Her volume status seemingly improved from admission but her BNP continues to be very high.  Could some other process be driving this?  PE?  LE US ordered and negative for DVT.  Cannot do a CTA chest due to renal function. VQ scan not helpful at this time due to abnormal CXR.  -Imdur added per Cardiology  -Prednisone added for wheezing  -CXR as needed.  Has been stable with small effusions and atelectasis.   -Strict ins and outs  -Tele monitoring  -Attempting to wean her off supplemental oxygen during the day per her baseline.  -PT/OT  -HH ordered for discharge but may no longer be appropriate given she is weaker and dwindling.       VTE Risk Mitigation (From admission, onward)           Ordered     enoxaparin injection 60 mg  Every 24 hours (non-standard times)         10/18/22 1102     IP VTE LOW RISK PATIENT  Once         10/16/22 2046     Place sequential compression device  Until discontinued         10/16/22 2046                      Department Hospital Medicine  [facility]  Joey Ramsey MD  Date of service: 10/26/2022

## 2022-10-26 NOTE — CARE UPDATE
10/26/22 1049   Patient Assessment/Suction   Level of Consciousness (AVPU) alert   Respiratory Effort Mild   Expansion/Accessory Muscles/Retractions no use of accessory muscles   All Lung Fields Breath Sounds crackles;diminished  (audible throat wheeze)   Rhythm/Pattern, Respiratory unlabored   Skin Integrity   $ Wound Care Tech Time 15 min   Area Observed Bridge of nose   Skin Appearance redness blanchable  (small scrape on nose from bipap mask)   PRE-TX-O2   O2 Device (Oxygen Therapy) nasal cannula   $ Is the patient on Low Flow Oxygen? Yes   Flow (L/min) 3  (decreased from 4L)   SpO2 100 %   Pulse Oximetry Type Intermittent   $ Pulse Oximetry - Multiple Charge Pulse Oximetry - Multiple   Pulse 75   Resp (!) 22   Aerosol Therapy   $ Aerosol Therapy Charges Aerosol Treatment   Daily Review of Necessity (SVN) completed   Respiratory Treatment Status (SVN) given   Treatment Route (SVN) mouthpiece;oxygen   Patient Position (SVN) HOB elevated   Post Treatment Assessment (SVN) patient reports breathing improved  (wheezing in lungs after treatment)   Signs of Intolerance (SVN) none   Inhaler   $ Inhaler Charges MDI (Metered Dose Inahler) Treatment  [facility]   Daily Review of Necessity (Inhaler) completed   Respiratory Treatment Status (Inhaler) given;mouth rinsed post treatment   Treatment Route (Inhaler) mouthpiece   Patient Position (Inhaler) HOB elevated   Post Treatment Assessment (Inhaler) breath sounds unchanged;vital signs unchanged   Signs of Intolerance (Inhaler) none   Preset CPAP/BiPAP Settings   Mode Of Delivery BiPAP S/T;Standby   $ CPAP/BiPAP Daily Charge BiPAP/CPAP Daily   Education   $ Education Bronchodilator;15 min   Respiratory Evaluation   $ Care Plan Tech Time 15 min   $ Eval/Re-eval Charges Re-evaluation

## 2022-10-26 NOTE — PLAN OF CARE
Problem: Adult Inpatient Plan of Care  Goal: Plan of Care Review  Outcome: Ongoing, Progressing  Goal: Absence of Hospital-Acquired Illness or Injury  Outcome: Ongoing, Progressing  Goal: Optimal Comfort and Wellbeing  Outcome: Ongoing, Progressing     Problem: Diabetes Comorbidity  Goal: Blood Glucose Level Within Targeted Range  Outcome: Ongoing, Progressing     Problem: Oral Intake Inadequate (Acute Kidney Injury/Impairment)  Goal: Optimal Nutrition Intake  Outcome: Ongoing, Progressing     Problem: Fluid Imbalance (Pneumonia)  Goal: Fluid Balance  Outcome: Ongoing, Progressing     Problem: Infection (Pneumonia)  Goal: Resolution of Infection Signs and Symptoms  Outcome: Ongoing, Progressing     Problem: Fall Injury Risk  Goal: Absence of Fall and Fall-Related Injury  Outcome: Ongoing, Progressing     Problem: Skin Injury Risk Increased  Goal: Skin Health and Integrity  Outcome: Ongoing, Progressing

## 2022-10-26 NOTE — PLAN OF CARE
Replaced by [facility]  Discharge Reassessment    Primary Care Provider: Eli Edmonds MD    Expected Discharge Date:     MD discussed Pt this day, 10/26. Pt not cleared for discharge. Pt to discharge home with home health.  called [facility] ([phone]) to inquire about Pt's home health referral. Per [facility] network representative, Pt will have home health services through [facility].    Reassessment (most recent)       Discharge Reassessment - 10/26/22 1343          Discharge Reassessment    Assessment Type Discharge Planning Reassessment     Did the patient's condition or plan change since previous assessment? Yes     Discharge Plan discussed with: Patient     Communicated ELYSSA with patient/caregiver Yes     Discharge Plan A Home Health     Discharge Plan B Home Health     DME Needed Upon Discharge  none     Discharge Barriers Identified None     Why the patient remains in the hospital Requires continued medical care        Post-Acute Status    Post-Acute Authorization Home Health     Home Health Status Set-up Complete/Auth obtained     Coverage Payor:  [employer] - [employer]     Discharge Delays None known at this time

## 2022-10-26 NOTE — H&P
Consult Note  Nephrology    Consult Requested By: Jeoy Ramsey MD    Reason for Consult: SHALINI on CKD 4    SUBJECTIVE:     History of Present Illness:  89 y/o female patient known to our practice, follows up with Dr. Jo in out patient clinic.  She was admitted on 10/16 with c/o resp distress, in need of diuresis.  Her Scr has been slowly creeping up, has hx of CKD 4.  Nephrology is consulted for co-management.      10/26  breathing tx in progress, no complaints.  Had CT abd/pelvis this year, non-remarkable renal wise.  Ordered UA w/micro.  Pt without complaints.    Assessment/plan:    SHALINI  CKD 4  CHF  HTN  DM 2    --ordered UA w/micro.  No need for renal US d/t recent CT.  F/u labs  --Avoid nephrotoxic agents.  No NSAIDs, COXibs, or aminoglycoside antibiotics.  Dose all medications for level of renal function.  --cardiology following, on low dose lasix.  --pressures stable, avoid hypotension, keep MAP >55  --Blood glucose control per primary team.    Past Medical History:   Diagnosis Date    Anemia due to multiple mechanisms 6/29/2018    Anemia, chronic disease 6/29/2018    Anemia, chronic renal failure, stage 3 (moderate) 6/29/2018    Anticoagulant long-term use     aspirin    Aortic aneurysm     Chest pain     CHF (congestive heart failure)     COPD (chronic obstructive pulmonary disease)     COPD with acute exacerbation 1/9/2015    Depression     Diabetes mellitus type II     no longer on any DM meds    DVT (deep venous thrombosis) 06/09/2018    Encounter for blood transfusion     GERD (gastroesophageal reflux disease)     GI bleed     Gout     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 8/7/2018    Hip arthritis 3/1/2016    Homocysteinemia 8/7/2018    Hyperlipidemia     Hypertension     Incontinent of urine     Normocytic normochromic anemia 6/29/2018    Oxygen dependent     use oxygen as needed    PE (pulmonary thromboembolism) 06/09/2018    Pneumonia of right lower lobe due to infectious organism 9/11/2017     Syncope     Thyroid disease     hypothyroid    Type 2 diabetes mellitus with stage 3 chronic kidney disease 1/18/2013    UTI (urinary tract infection)      Past Surgical History:   Procedure Laterality Date    APPENDECTOMY      CHOLECYSTECTOMY      COLONOSCOPY Left 10/29/2020    Procedure: COLONOSCOPY;  Surgeon: Singh Kee III, MD;  Location: [facility] ENDO;  Service: Endoscopy;  Laterality: Left;    ESOPHAGOGASTRODUODENOSCOPY Left 10/26/2020    Procedure: EGD (ESOPHAGOGASTRODUODENOSCOPY);  Surgeon: Kareem Gramajo MD;  Location: [facility] ENDO;  Service: Endoscopy;  Laterality: Left;    ESOPHAGOGASTRODUODENOSCOPY Left 10/28/2020    Procedure: EGD (ESOPHAGOGASTRODUODENOSCOPY);  Surgeon: Kareem Gramajo MD;  Location: [facility] ENDO;  Service: Endoscopy;  Laterality: Left;    ESOPHAGOGASTRODUODENOSCOPY N/A 9/16/2021    Procedure: EGD (ESOPHAGOGASTRODUODENOSCOPY);  Surgeon: Kareem Gramajo MD;  Location: [facility];  Service: Endoscopy;  Laterality: N/A;    FRACTURE SURGERY      right hip     HERNIA REPAIR      groin    HYSTERECTOMY      INSERTION OF IMPLANTABLE LOOP RECORDER N/A 12/12/2018    Procedure: Insertion, Implantable Loop Recorder;  Surgeon: Ashok Herbert MD;  Location: [facility] CATH LAB;  Service: Cardiology;  Laterality: N/A;    PERCUTANEOUS PINNING OF HIP Left 3/23/2019    Procedure: PINNING, HIP, PERCUTANEOUS;  Surgeon: Jorje Hamlin MD;  Location: [facility] OR;  Service: Orthopedics;  Laterality: Left;    SMALL BOWEL ENTEROSCOPY N/A 10/29/2020    Procedure: ENTEROSCOPY;  Surgeon: Singh Kee III, MD;  Location: [facility];  Service: Endoscopy;  Laterality: N/A;    VARICOSE VEIN SURGERY       Family History   Problem Relation Age of Onset    Hypertension Mother     Heart disease Mother     Lung disease Father     Diabetes Sister     Diabetes Brother     Kidney disease Son     Breast cancer Neg Hx     Ovarian cancer Neg Hx      Social History     Tobacco Use    Smoking status: Former     Packs/day: 0.35      Years: 44.00     Pack years: 15.40     Types: Cigarettes     Start date: 1970    Smokeless tobacco: Never    Tobacco comments:     1/08/2015   Substance Use Topics    Alcohol use: No     Alcohol/week: 0.0 standard drinks    Drug use: No       Review of patient's allergies indicates:   Allergen Reactions    Carvedilol Other (See Comments)     Bradycardia and syncope    Boniva [ibandronate]     Codeine     Hydralazine analogues     Iodinated contrast media Hives    Kionex [sodium polystyrene sulfonate] Diarrhea     From encounter 12/11/17 for diarrhea--not true allergy.    Lisinopril     Morphine         Review of Systems:  Negative unless noted above    OBJECTIVE:     Vital Signs Range (Last 24H):  Temp:  [96.7 °F (35.9 °C)-97.7 °F (36.5 °C)]   Pulse:  [72-91]   Resp:  [14-20]   BP: (111-131)/(53-72)   SpO2:  [93 %-99 %]     Physical Exam:  General- NAD noted  HEENT- WNL  Neck- supple  CV- Regular rate and rhythm  Resp- crackles to bases, slight wheeze  GI- Non tender/non-distended, BS normoactive x4 quads  Extrem- No cyanosis, clubbing, edema.  Derm- skin w/d  Neuro-  No flap.     Body mass index is 22.26 kg/m².    Laboratory:  CBC:   Recent Labs   Lab 10/24/22  0500   WBC 6.37   RBC 2.64*   HGB 8.5*   HCT 27.1*      *   MCH 32.2*   MCHC 31.4*     CMP:   Recent Labs   Lab 10/26/22  0515      CALCIUM 8.4*   ALBUMIN 3.2*   PROT 5.8*      K 5.1   CO2 26      BUN 78*   CREATININE 2.3*   ALKPHOS 59   ALT 29   AST 32   BILITOT 0.6       Diagnostic Results:  Labs: Reviewed      ASSESSMENT/PLAN:     Active Hospital Problems    Diagnosis  POA    *Pneumonia [J18.9]  Yes    Acute on chronic combined systolic and diastolic heart failure [I50.43]  Yes      Resolved Hospital Problems   No resolved problems to display.         Thank you for allowing us to participate in the care of your patient. We will follow the patient and provide recommendations as needed.      Time spent seeing patient(  greater than 1/2 spent in direct contact) : Arabella Serrato, NP

## 2022-10-26 NOTE — NURSING
Patient was feeling pretty good until 0130 this morning she got very short of breath and was wheezing. Gave her a neb treatment and put her on BiPap overnight. She feels better with the Bipap on. Safety and comfort measures have been met.

## 2022-10-26 NOTE — PT/OT/SLP PROGRESS
Physical Therapy Treatment    Patient Name:  Blaire Cage   MRN:  [id number]    Recommendations:     Discharge Recommendations:  home health PT   Discharge Equipment Recommendations: none   Barriers to discharge:  pain, decreased functional mobility and cardiorespiratory function    Assessment:     Blaire Cage is a 89 y.o. female admitted with a medical diagnosis of Pneumonia.  She presents with the following impairments/functional limitations:  weakness, impaired endurance, impaired self care skills, gait instability, pain, impaired cardiopulmonary response to activity.  Pt reports abdominal pain but declines transfer to [facility] or any out of bed activity. Agreed to LE exercises in bed but were extremely limited due to c/o SOB and fatigue. Nurse made aware.     Rehab Prognosis: Fair; patient would benefit from acute skilled PT services to address these deficits and reach maximum level of function.    Recent Surgery: * No surgery found *      Plan:     During this hospitalization, patient to be seen 5 x/week to address the identified rehab impairments via gait training, therapeutic activities, therapeutic exercises and progress toward the following goals:    Plan of Care Expires:  11/23/22    Subjective     Chief Complaint: pain, SOB  Patient/Family Comments/goals: none expressed  Pain/Comfort:  Pain Rating 1: 10/10  Location - Side 1: Bilateral  Location - Orientation 1: generalized  Location 1: abdomen  Pain Addressed 1: Reposition, Distraction, Cessation of Activity  Pain Rating Post-Intervention 1: 10/10      Objective:     Communicated with nurse Bryson prior to session.  Patient found HOB elevated with bed alarm, oxygen, peripheral IV, telemetry upon PT entry to room.     General Precautions: Standard, fall, diabetic, respiratory   Orthopedic Precautions:N/A   Braces: N/A  Respiratory Status: Nasal cannula, flow 3 L/min     Functional Mobility:  Bed Mobility:         AM-PAC 6 CLICK MOBILITY           Treatment & Education:  -Supine BLE therapeutic exercises to prevent functional decline: Heel slides, AP, Bridges all x 10 reps  -Initiated SAQ exercise, but pt refused further activity due to SOB and pain    Patient left HOB elevated with all lines intact, call button in reach, bed alarm on, and nurse Adelaide notified..    GOALS:   Multidisciplinary Problems       Physical Therapy Goals          Problem: Physical Therapy    Goal Priority Disciplines Outcome Goal Variances Interventions   Physical Therapy Goal     PT, PT/OT      Description: Goals to be met by: 22     Patient will increase functional independence with mobility by performin. Supine to sit with Supervision  2. Sit to stand transfer with Supervision  3. Bed to chair transfer with Supervision using Rolling Walker  4. Gait  x 50 feet with Supervision using Rolling Walker.                              Time Tracking:     PT Received On: 10/26/22  PT Start Time: 954     PT Stop Time: 1005  PT Total Time (min): 11 min     Billable Minutes: Therapeutic Exercise 11    Treatment Type: Treatment  PT/PTA: PTA     PTA Visit Number: 2     10/26/2022

## 2022-10-26 NOTE — CONSULTS
[facility]  Cardiology  Progress Note    Patient Name: Blaire Cage  MRN: [id number]  Admission Date: 10/16/2022  Hospital Length of Stay: 8 days  Code Status: DNR   Attending Physician: Joey Ramsey MD   Primary Care Physician: Eli Edmonds MD  Expected Discharge Date:   Principal Problem:Pneumonia    Subjective:     Hospital Course:  Patient is barely responsive  She is on BiPAP  Yesterday she was complaining about abdominal pain  Basically she has heart failure reduced ejection fraction chronic kidney disease and is on IV Lasix  She is on Isordil to reduce preload afterload for the mitral and tricuspid regurgitation  Overall prognosis is guarded      Interval History:     ROS  Objective:     Vital Signs (Most Recent):  Temp: 96.7 °F (35.9 °C) (10/26/22 0817)  Pulse: 73 (10/26/22 0817)  Resp: 20 (10/26/22 0817)  BP: 124/72 (10/26/22 0817)  SpO2: 98 % (10/26/22 0817) Vital Signs (24h Range):  Temp:  [96.7 °F (35.9 °C)-97.7 °F (36.5 °C)] 96.7 °F (35.9 °C)  Pulse:  [72-91] 73  Resp:  [14-20] 20  SpO2:  [93 %-99 %] 98 %  BP: (111-131)/(53-72) 124/72     Weight: 57 kg (125 lb 10.6 oz)  Body mass index is 22.26 kg/m².    SpO2: 98 %  O2 Device (Oxygen Therapy): BiPAP      Intake/Output Summary (Last 24 hours) at 10/26/2022 0944  Last data filed at 10/26/2022 0345  Gross per 24 hour   Intake 150 ml   Output 100 ml   Net 50 ml       Lines/Drains/Airways       Drain  Duration             Female External Urinary Catheter 10/18/22 0600 8 days              Peripheral Intravenous Line  Duration                  Peripheral IV - Single Lumen 10/22/22 0445 22 G Anterior;Left Upper Arm 4 days                    Physical Exam lungs slightly diminished breath sounds  Cardiac systolic murmurs  Legs minimal edema    Significant Labs: CMP   Recent Labs   Lab 10/25/22  0509 10/26/22  0515    137   K 5.1 5.1    100   CO2 26 26    108   BUN 69* 78*   CREATININE 2.2* 2.3*   CALCIUM 8.3* 8.4*   PROT  5.5* 5.8*   ALBUMIN 2.7* 3.2*   BILITOT 0.6 0.6   ALKPHOS 59 59   AST 30 32   ALT 27 29   ANIONGAP 10 11    and CBC No results for input(s): WBC, HGB, HCT, PLT in the last 48 hours.    Significant Imagin. Unchanged cardiomegaly, interstitial opacities, and small bilateral pleural effusions and associated airspace opacity either reflecting atelectasis or consolidation.  The findings suggest pulmonary edema related to heart failure or hypervolemia, without significant change.  2. Unchanged scattered linear opacities in mid and lower lung zones suggesting superimposed atelectasis and or scarring.  Assessment and Plan:   Heart failure reduced ejection fraction chronic kidney disease  Frailty  Troponins are trending down  Consider hospice care at home   Brief HPI:     Active Diagnoses:    Diagnosis Date Noted POA    PRINCIPAL PROBLEM:  Pneumonia [J18.9] 2017 Yes    Acute on chronic combined systolic and diastolic heart failure [I50.43] 10/19/2021 Yes      Problems Resolved During this Admission:       VTE Risk Mitigation (From admission, onward)           Ordered     enoxaparin injection 60 mg  Every 24 hours (non-standard times)         10/18/22 1102     IP VTE LOW RISK PATIENT  Once         10/16/22 2046     Place sequential compression device  Until discontinued         10/16/22 2046                    Luis Carlos Chaudhari MD  Cardiology  [facility]

## 2022-10-27 NOTE — CARE UPDATE
10/27/22 0751   Patient Assessment/Suction   Level of Consciousness (AVPU) alert   Respiratory Effort Mild   Expansion/Accessory Muscles/Retractions no use of accessory muscles   All Lung Fields Breath Sounds diminished   Rhythm/Pattern, Respiratory pattern regular   Cough Frequency infrequent   Cough Type congested   Skin Integrity   $ Wound Care Tech Time 15 min   Area Observed Bridge of nose   Skin Appearance redness blanchable  (small scrape on nose from bipap mask)   PRE-TX-O2   O2 Device (Oxygen Therapy) nasal cannula   $ Is the patient on Low Flow Oxygen? Yes   Flow (L/min) 3   SpO2 97 %   Pulse Oximetry Type Intermittent   $ Pulse Oximetry - Multiple Charge Pulse Oximetry - Multiple   Pulse 78   Resp 18   Aerosol Therapy   $ Aerosol Therapy Charges Aerosol Treatment   Daily Review of Necessity (SVN) completed   Respiratory Treatment Status (SVN) given   Treatment Route (SVN) mouthpiece;oxygen   Patient Position (SVN) HOB elevated   Post Treatment Assessment (SVN) vital signs unchanged   Signs of Intolerance (SVN) none   Inhaler   $ Inhaler Charges MDI (Metered Dose Inahler) Treatment  (home med)   Daily Review of Necessity (Inhaler) completed   Respiratory Treatment Status (Inhaler) given;mouth rinsed post treatment   Treatment Route (Inhaler) mouthpiece   Patient Position (Inhaler) HOB elevated   Post Treatment Assessment (Inhaler) breath sounds unchanged;vital signs unchanged   Signs of Intolerance (Inhaler) none   Preset CPAP/BiPAP Settings   Mode Of Delivery BiPAP S/T;Standby   $ CPAP/BiPAP Daily Charge BiPAP/CPAP Daily   Education   $ Education Bronchodilator;15 min   Respiratory Evaluation   $ Care Plan Tech Time 15 min   $ Eval/Re-eval Charges Re-evaluation

## 2022-10-27 NOTE — PROGRESS NOTES
[facility] Medicine  Progress Note    Patient name: Blaire Cage  MRN: [id number]  Admit Date: 10/16/2022   LOS: 9 days     SUBJECTIVE:     Principal problem: Pneumonia    Interval History:   Patient continues to get weaker and appear more SOB.  CXR today with worsening opacities despite being on IV lasix and Albumin.  Abd xray with non obstructive pattern but she has continued discomfort- I am stopping lactulose. Renal function continues to worsen.  Nephrology reports she does not wish for HD and hospice would be appropriate. I discussed with patient who understandably wants to discuss with her family.  I called her daughters and spoke to two by 3 way call to update them on current events and the recommendation for hospice.  They will visit today to speak to their mother further and will let us know their wishes.     Hospital Course:     I, Dr. Ramsey, have assumed care 10/18. Patient admitted with suspected pneumonia and started on IV abx. Patient transferred to step down unit overnight after developing respiratory distress with reports of difficulty weaning her from bipap.  Being treated for pneumonia with IV abx. Repeat CXR with unchanged appearing RLL opacity.  Her BNP is elevated at 2653 on admission which is significantly higher than her previous (278 on 3/22).  She was started on IV lasix given concern for HF. She has only put out 900 at the time of this note writing. Troponin mildly elevated but static.  Echo ordered and EF 45% with segmental LV wall motion abnormalities and anterior apical akinesia which appears much different from her 3/2022 echo. Echo also reveals mod to severe TR, mod to severe MR and mod to severe pulm HTN which appears worse from prior. Cardiology consulted and has evaluated the patient. Imdur added and recommend changing oral diltiazem to a beta blocker when lungs are improved. Bipap able to be weaned.  Supplemental oxygen able to be weaned down.  10/20 She  appears to be dry. CXR improved.  Small bump in Cr and BUN and thus stopping IV lasix.  She has not had a BM for several days. She received an enema without producing a BM and had what sounds like a near syncope event after enema. KUB ordered and reveals possible ileus of SBO. She tells me she is passing gas. Repeat imaging 10/22 with non obstructive bowel gas pattern. 10/23  BNP re checked and continues to rise. I discussed with Cardiology and IV lasix added back. Lokelma x 1 given for the hyperkalemia.  Enema given and she had a moderate sized BM!  10/25 BNP still greater than 4500.  Renal function bumped with IV lasix and thus I have consulted Nephrology. She had more BMs today.  I'm not sure about this clinical picture- could a PE be driving any of this?  Cannot do a CTA chest due to renal function. VQ scan will be abnormal as CXR is abnormal. LE US of the legs ordered and were negative for DVT. 10/26 Patient required bipap to sleep overnight due to SOB. Not able to get out of bed today due to abdominal pain.  Passing gas and good bowel sounds.  RN supplements liquid stool yesterday but none today.  She is only drinking glucerna and not eating well.  She is quite weak per RN.  I'm afraid she is dwindling.  With her permission, I called her daughter Ramonita and left a voicemail.    Scheduled Meds:   ascorbic acid (vitamin C)  500 mg Oral Daily    aspirin  81 mg Oral Daily    atorvastatin  40 mg Oral Daily    diltiaZEM  180 mg Oral Daily    docusate sodium  100 mg Oral Daily    enoxparin  1 mg/kg Subcutaneous Q24H    epoetin walker-ebpx (RETACRIT) injection  100 Units/kg Subcutaneous Every Tues, Thurs, Sat    fluticasone-umeclidin-vilanter  1 puff Inhalation Daily    isosorbide mononitrate  30 mg Oral Daily    lactulose  20 g Oral TID    levothyroxine  88 mcg Oral Before breakfast    montelukast  10 mg Oral QHS    polyethylene glycol  17 g Oral Daily    predniSONE  20 mg Oral Daily    sertraline  50 mg Oral Daily      Continuous Infusions:  PRN Meds:acetaminophen, albuterol-ipratropium, dextrose 10%, dextrose 10%, LORazepam, melatonin, ondansetron, oxyCODONE-acetaminophen, polyethylene glycol, sodium chloride 0.9%, sodium chloride 0.9%    Review of patient's allergies indicates:   Allergen Reactions    Carvedilol Other (See Comments)     Bradycardia and syncope    Boniva [ibandronate]     Codeine     Hydralazine analogues     Iodinated contrast media Hives    Kionex [sodium polystyrene sulfonate] Diarrhea     From encounter 12/11/17 for diarrhea--not true allergy.    Lisinopril     Morphine        Review of Systems: As per interval history    OBJECTIVE:     Vital Signs (Most Recent)  Temp: 97.6 °F (36.4 °C) (10/27/22 1121)  Pulse: 77 (10/27/22 1121)  Resp: 19 (10/27/22 1121)  BP: 121/70 (10/27/22 1121)  SpO2: (!) 91 % (10/27/22 1121)    Vital Signs Range (Last 24H):  Temp:  [97.1 °F (36.2 °C)-97.9 °F (36.6 °C)]   Pulse:  [74-84]   Resp:  [18-20]   BP: (115-137)/(63-70)   SpO2:  [91 %-99 %]     I & O (Last 24H):  Intake/Output Summary (Last 24 hours) at 10/27/2022 1413  Last data filed at 10/27/2022 1010  Gross per 24 hour   Intake 660 ml   Output 800 ml   Net -140 ml         Physical Exam:    Vitals and nursing note reviewed.     Constitutional:       General: Not in acute distress.     Appearance: Well-developed.   HENT:      Head: Normocephalic and atraumatic.   Eyes:      Pupils: Pupils are equal, round, and reactive to light.   Cardiovascular:      Rate and Rhythm: Regular rhythm.   No LE edema  Pulmonary:      Effort: Pulmonary effort is normal.      Breath sounds: Normal breath sounds. Mild bilateral expiratory wheeze.  Small, shallow breaths.  O2 per NC  Abdominal:      General: There is no distension.      Palpations: Abdomen is full     Tenderness: There is no abdominal tenderness. There is no guarding or rebound.   Musculoskeletal:         General: Normal range of motion.      Cervical back: Normal range of motion.    Skin:     Findings: No rash.   Neurological:      Mental Status: Alert and oriented to person, place, and time.      Cranial Nerves: No cranial nerve deficit.      Sensory: No sensory deficit.     Laboratory:  I have reviewed all pertinent lab results within the past 24 hours.  CBC:   Recent Labs   Lab 10/27/22  0430   WBC 12.35   RBC 2.39*   HGB 7.7*   HCT 24.4*      *   MCH 32.2*   MCHC 31.6*       CMP:   Recent Labs   Lab 10/27/22  0430   *   CALCIUM 8.4*   ALBUMIN 3.2*   PROT 5.7*      K 5.8*   CO2 29   CL 98   BUN 84*   CREATININE 2.5*   ALKPHOS 59   ALT 31   AST 33   BILITOT 0.6       Cardiac markers:   Recent Labs   Lab 10/24/22  2226   TROPONINI 0.752*       Microbiology Results (last 7 days)       Procedure Component Value Units Date/Time    Blood culture x two cultures. Draw prior to antibiotics. [[id number]] Collected: 10/16/22 1945    Order Status: Completed Specimen: Blood from Peripheral, Antecubital, Right Updated: 10/21/22 2032     Blood Culture, Routine No growth after 5 days.    Narrative:      Aerobic and anaerobic    Blood culture x two cultures. Draw prior to antibiotics. [[id number]] Collected: 10/16/22 1840    Order Status: Completed Specimen: Blood from Peripheral, Hand, Right Updated: 10/21/22 2032     Blood Culture, Routine No growth after 5 days.    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         Plan:     -I'm afraid she is dwindling and not getting better and is actively worsening on 10/27 with weakness, SOB and pain.  I discussed this with her that i'm afraid i'm not able to get her better.  I updated family and discussed Dr. Jo's  recommendation of hospice which would also be my recommendation. They are to discuss amongst themselves today and let us know their wishes. I have updated Social Work that this may be the case.  -New ileus versus bowel obstruction versus constipation.  Colace, Miralax, Lactulose.  NGT if starts to vomit. She needs to have a bowel movement and ileus/obstruction be definitively ruled out prior to discharge. Abd xray somewhat improved 10/22 but still with dilated loops of bowel. Enema ordered and finally successful bowel movement 10/23.  Continuing bowel regimen. I have stopped Lactulose as could be leading to her abdominal pain.   -empiric tx with IV abx as CXR appears more pneumonia than pulmonary edema given the asymetry. I agree with  pulmonary and will commit patient to five day course of abx- completed 10/21.  -Elevated BNP and new echo findings are more convincing of acute HF, however.  Started IV lasix and Cardiology consulted. Renal function climbing and thus I have put lasix on hold as CXR is now improved and no volume overload on exam. 10/23 Restarted Lasix per cardiology as BNP continues to rise.  10/25 Nephrology consulted as bump in renal function and BNP continues to be very high.  Albumin added per Cardiology to assist in diuresis.  Renal function continues to worsen and CXR worsens despite diuresis.    -Her volume status seemingly improved initially from admission but her BNP continues to be very high.  Could some other process be driving this?  PE?  LE US ordered and negative for DVT.  Cannot do a CTA chest due to renal function. VQ scan not helpful at this time due to abnormal CXR.  -Imdur added per Cardiology  -Prednisone added for wheezing  -CXR worse 10/27  -Strict ins and outs  -Tele monitoring  -PT/OT        VTE Risk Mitigation (From admission, onward)           Ordered     enoxaparin injection 60 mg  Every 24 hours (non-standard times)         10/18/22 1102     IP VTE LOW RISK PATIENT  Once          10/16/22 2046     Place sequential compression device  Until discontinued         10/16/22 2046                      Department Hospital Medicine  [facility]  Joey Ramsey MD  Date of service: 10/27/2022

## 2022-10-27 NOTE — PROGRESS NOTES
Progress Note  Nephrology    Consult Requested By: Joey Ramsey MD    Reason for Consult: SHALINI on CKD 4    SUBJECTIVE:     History of Present Illness:  89 y/o female patient known to our practice, follows up with Dr. Jo in out patient clinic.  She was admitted on 10/16 with c/o resp distress, in need of diuresis.  Her Scr has been slowly creeping up, has hx of CKD 4.  Nephrology is consulted for co-management.      10/26  breathing tx in progress, no complaints.  Had CT abd/pelvis this year, non-remarkable renal wise.  Ordered UA w/micro.  Pt without complaints.  10/27 VSS, on 3L NC, UOP 800cc    Echo:  Moderate concentric hypertrophy and mildly decreased systolic function.  The estimated ejection fraction is 45%.  There are segmental left ventricular wall motion abnormalities. Anterior apical akinesia  Moderate to severe tricuspid regurgitation.  Moderate-to-severe mitral regurgitation.  There is moderate to severe pulmonary hypertension.  Severe left atrial enlargement.  Grade II left ventricular diastolic dysfunction.  Atrial fibrillation not observed.  There is right ventricular hypertrophy.  There is mild mitral stenosis. Mitral valve not well seen because of heavy mitral annular calcification    Assessment/plan:    SHALINI/CKD 4 due to cardiorenal syndrome  HTN/Combined CHF/Valvular CHF/Pulm HTN/Acute pulm edema  Hyperkalemia  SHPT  Anemia of CKD    - renal function is getting worse with diuresis  - stop further doses of albumin and IV lasix  - give her some time for plasma refill  - CXR today suggest more infectious process rather than fluid- defer management to hospitalist- on COPD management- dose antbx for CrCl < 10  - given dose of lokelma today- add a renal diet  - check phos  - add MADHAV SQ 3x per week    She is not a candidate for RRT nor does she want it.   I think it is time to involve hospice- will discuss with hospitalist.     Review of Systems:  Negative unless noted above    OBJECTIVE:     Vital  Signs Range (Last 24H):  Temp:  [97.1 °F (36.2 °C)-97.9 °F (36.6 °C)]   Pulse:  [74-84]   Resp:  [18-22]   BP: (113-137)/(63-66)   SpO2:  [95 %-100 %]     Physical Exam:  General- NAD noted  HEENT- WNL  Neck- supple  CV- Regular rate and rhythm  Resp- crackles to bases, slight wheeze  GI- Non tender/non-distended, BS normoactive x4 quads  Extrem- No cyanosis, clubbing, edema.  Derm- skin w/d  Neuro-  No flap.     Body mass index is 22.26 kg/m².    Laboratory:  CBC:   Recent Labs   Lab 10/27/22  0430   WBC 12.35   RBC 2.39*   HGB 7.7*   HCT 24.4*      *   MCH 32.2*   MCHC 31.6*       CMP:   Recent Labs   Lab 10/27/22  0430   *   CALCIUM 8.4*   ALBUMIN 3.2*   PROT 5.7*      K 5.8*   CO2 29   CL 98   BUN 84*   CREATININE 2.5*   ALKPHOS 59   ALT 31   AST 33   BILITOT 0.6         Diagnostic Results:  Labs: Reviewed      ASSESSMENT/PLAN:     Active Hospital Problems    Diagnosis  POA    *Pneumonia [J18.9]  Yes    Acute on chronic combined systolic and diastolic heart failure [I50.43]  Yes      Resolved Hospital Problems   No resolved problems to display.     Patient care time was spent personally by me on the following activities: > 35 minutes  Obtaining a history.  Examination of patient.  Providing medical care at the patients bedside.  Developing a treatment plan with patient or surrogate and bedside caregivers.  Ordering and reviewing laboratory studies, radiographic studies, pulse oximetry.  Ordering and performing treatments and interventions.  Evaluation of patient's response to treatment.  Discussions with consultants while on the unit and immediately available to the patient.  Re-evaluation of the patient's condition.  Documentation in the medical record.      Thank you for allowing us to participate in the care of your patient. We will follow the patient and provide recommendations as needed.    Miguel Jo MD MPH  [facility] [place]  [address] [place] [address]  [address]  [phone] (p)  [phone] (f)

## 2022-10-27 NOTE — PLAN OF CARE
[facility]  Discharge Reassessment    Primary Care Provider: Eli Edmonds MD    Expected Discharge Date:     MD discussed this Pt. MD informed  hospice as discharge plan has been introduced.  follow-up with Pt at bedside. Pt's family was also present at time. Pt's daughter (Cousin,Toya Daughter [phone]) verbalized plan for Pt to discharge with hospice.  inquired about preference for hospice company. Pt's family did not have a preference.  called Juan ([facility] hospice, [phone]) to contact family for informational visit.  to follow-up.    Reassessment (most recent)       Discharge Reassessment - 10/27/22 2647          Discharge Reassessment    Assessment Type Discharge Planning Reassessment     Did the patient's condition or plan change since previous assessment? Yes     Discharge Plan discussed with: Patient;Adult children     Communicated ELYSSA with patient/caregiver Yes     Discharge Plan A Hospice/home     Discharge Plan B Home Health     DME Needed Upon Discharge  none     Discharge Barriers Identified None        Post-Acute Status    Post-Acute Authorization Home Health;Hospice     Home Health Status Set-up Complete/Auth obtained     Hospice Status Referrals Sent     Coverage Payor:  [employer] - [employer] 65     Discharge Delays None known at this time

## 2022-10-27 NOTE — PLAN OF CARE
Problem: Adult Inpatient Plan of Care  Goal: Plan of Care Review  Outcome: Ongoing, Progressing  Goal: Optimal Comfort and Wellbeing  Outcome: Ongoing, Progressing     Problem: Diabetes Comorbidity  Goal: Blood Glucose Level Within Targeted Range  Outcome: Ongoing, Progressing     Problem: Renal Function Impairment (Acute Kidney Injury/Impairment)  Goal: Effective Renal Function  Outcome: Ongoing, Progressing     Problem: Infection (Pneumonia)  Goal: Resolution of Infection Signs and Symptoms  Outcome: Ongoing, Progressing     Problem: Fall Injury Risk  Goal: Absence of Fall and Fall-Related Injury  Outcome: Ongoing, Progressing     Problem: Skin Injury Risk Increased  Goal: Skin Health and Integrity  Outcome: Ongoing, Progressing

## 2022-10-27 NOTE — PT/OT/SLP PROGRESS
Physical Therapy      Patient Name:  Blaire Cage   MRN:  [id number]    Patient not seen today secondary to Other (Comment) (Pt declined due to severe nausea). Will follow-up 10/28/22.

## 2022-10-28 NOTE — PLAN OF CARE
0859 -  received call from Juan ([facility] hospice, [phone]) about Pt's discharge plan. Per Juan, Pt can be admitted to hospice at home today, 10/28.

## 2022-10-28 NOTE — PLAN OF CARE
Problem: Adult Inpatient Plan of Care  Goal: Plan of Care Review  Outcome: Met  Goal: Patient-Specific Goal (Individualized)  Outcome: Met  Goal: Absence of Hospital-Acquired Illness or Injury  Outcome: Met  Goal: Optimal Comfort and Wellbeing  Outcome: Met  Goal: Readiness for Transition of Care  Outcome: Met     Problem: Diabetes Comorbidity  Goal: Blood Glucose Level Within Targeted Range  Outcome: Met     Problem: Fluid and Electrolyte Imbalance (Acute Kidney Injury/Impairment)  Goal: Fluid and Electrolyte Balance  Outcome: Met     Problem: Oral Intake Inadequate (Acute Kidney Injury/Impairment)  Goal: Optimal Nutrition Intake  Outcome: Met     Problem: Renal Function Impairment (Acute Kidney Injury/Impairment)  Goal: Effective Renal Function  Outcome: Met     Problem: Fluid Imbalance (Pneumonia)  Goal: Fluid Balance  Outcome: Met     Problem: Infection (Pneumonia)  Goal: Resolution of Infection Signs and Symptoms  Outcome: Met     Problem: Respiratory Compromise (Pneumonia)  Goal: Effective Oxygenation and Ventilation  Outcome: Met     Problem: Fall Injury Risk  Goal: Absence of Fall and Fall-Related Injury  Outcome: Met     Problem: Skin Injury Risk Increased  Goal: Skin Health and Integrity  Outcome: Met

## 2022-10-28 NOTE — PROGRESS NOTES
Patient will discharge with home hospice- this is very reasonable. Will sign off. Please call with questions.    Miguel Jo MD MPH  [facility] [address] [address]  [phone] (p)  [phone] (f)

## 2022-10-28 NOTE — PLAN OF CARE
[facility]  Discharge Final Note    Primary Care Provider: Eli Edmonds MD    Expected Discharge Date: 10/28/2022     called Juan (([facility], [phone]) to confirm delivery of equipment. Per Juan, HME to be delivered to Pt's home before 1400.  called Pt's daughter ((Cousin,Toya (Daughter) [phone]) to confirm discharge plans. Pt's daughter verbalized plan for Pt to discharge home with hospice services from [facility]  place request for ambulance transport to Pt's home (ADT 30). Pt has no other needs to be addressed by case management. Pt cleared to discharge by case management.      Final Discharge Note (most recent)       Final Note - 10/28/22 1336          Final Note    Assessment Type Final Discharge Note     Anticipated Discharge Disposition Hospice/Home     What phone number can be called within the next 1-3 days to see how you are doing after discharge? [phone]     Hospital Resources/Appts/Education Provided Provided patient/caregiver with written discharge plan information        Post-Acute Status    Post-Acute Authorization Hospice     Hospice Status Set-up Complete/Auth obtained     Coverage Payor:  [employer] - [employer] CHOICES 65     Discharge Delays None known at this time                     Important Message from Medicare  Important Message from Medicare regarding Discharge Appeal Rights: Given to patient/caregiver, Explained to patient/caregiver, Signed/date by patient/caregiver     Date IMM was signed: 10/22/22  Time IMM was signed: 2417

## 2022-10-28 NOTE — DISCHARGE SUMMARY
[facility]  Patient Name: Blaire Cage MRN: [id number]   Patient Class: IP- Inpatient  Length of Stay: 10   Admission Date: 10/16/2022  4:17 PM Attending Physician: Jenaro Graff MD   Primary Care Provider: Eli Edmonds MD Face-to-Face encounter date: 10/28/2022   Chief Complaint: Shortness of Breath and Weakness (Pt reports to ED with weakness/SOB starting last night. Pt has a hx of COPD(as needed/at night). Pt in nad at triage, vss, in wheelchair.)    Date of Discharge: 10/28/2022  Discharge Disposition:Hospice/Home    Condition: Stable       Reason for Hospitalization     Active Hospital Problems    Diagnosis    *Pneumonia    Acute on chronic combined systolic and diastolic heart failure   - respiratory distress  - acute renal failure  - cardiorenal syndrome  - severe TR  - mod to severe MR  - mod to severe pulm HTN   - deconditioning  - anemia of CKD        Brief History of Present Illness    Blaire Cage is a 89 y.o.  female who  has a past medical history of Anemia due to multiple mechanisms (6/29/2018), Anemia, chronic disease (6/29/2018), Anemia, chronic renal failure, stage 3 (moderate) (6/29/2018), Anticoagulant long-term use, Aortic aneurysm, Chest pain, CHF (congestive heart failure), COPD (chronic obstructive pulmonary disease), COPD with acute exacerbation (1/9/2015), Depression, Diabetes mellitus type II, DVT (deep venous thrombosis) (06/09/2018), Encounter for blood transfusion, GERD (gastroesophageal reflux disease), GI bleed, Gout, 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 (8/7/2018), Hip arthritis (3/1/2016), Homocysteinemia (8/7/2018), Hyperlipidemia, Hypertension, Incontinent of urine, Normocytic normochromic anemia (6/29/2018), Oxygen dependent, PE (pulmonary thromboembolism) (06/09/2018), Pneumonia of right lower lobe due to infectious organism (9/11/2017), Syncope, Thyroid disease, Type 2 diabetes mellitus with stage 3 chronic kidney disease  (1/18/2013), and UTI (urinary tract infection).. The patient presented to [facility] of [facility] on 10/16/2022 with a primary complaint of Shortness of Breath and Weakness (Pt reports to ED with weakness/SOB starting last night. Pt has a hx of COPD(as needed/at night). Pt in nad at triage, vss, in wheelchair.)  .     For the full HPI please refer to the History & Physical from this admission.    Hospital Course By Problem with Pertinent Findings      I, Dr. Ramsey, have assumed care 10/18. Patient admitted with suspected pneumonia and started on IV abx. Patient transferred to step down unit overnight after developing respiratory distress with reports of difficulty weaning her from bipap.  Being treated for pneumonia with IV abx. Repeat CXR with unchanged appearing RLL opacity.  Her BNP is elevated at 2653 on admission which is significantly higher than her previous (278 on 3/22).  She was started on IV lasix given concern for HF. She has only put out 900 at the time of this note writing. Troponin mildly elevated but static.  Echo ordered and EF 45% with segmental LV wall motion abnormalities and anterior apical akinesia which appears much different from her 3/2022 echo. Echo also reveals mod to severe TR, mod to severe MR and mod to severe pulm HTN which appears worse from prior. Cardiology consulted and has evaluated the patient. Imdur added and recommend changing oral diltiazem to a beta blocker when lungs are improved. Bipap able to be weaned.  Supplemental oxygen able to be weaned down.  10/20 She appears to be dry. CXR improved.  Small bump in Cr and BUN and thus stopping IV lasix.  She has not had a BM for several days. She received an enema without producing a BM and had what sounds like a near syncope event after enema. KUB ordered and reveals possible ileus of SBO. She tells me she is passing gas. Repeat imaging 10/22 with non obstructive bowel gas pattern. 10/23  BNP re checked and continues to rise. I  "discussed with Cardiology and IV lasix added back. Lokelma x 1 given for the hyperkalemia.  Enema given and she had a moderate sized BM!  10/25 BNP still greater than 4500.  Renal function bumped with IV lasix and thus I have consulted Nephrology. She had more BMs today.  I'm not sure about this clinical picture- could a PE be driving any of this?  Cannot do a CTA chest due to renal function. VQ scan will be abnormal as CXR is abnormal. LE US of the legs ordered and were negative for DVT. 10/26 Patient required bipap to sleep overnight due to SOB. Not able to get out of bed today due to abdominal pain.  Passing gas and good bowel sounds.  RN supplements liquid stool yesterday but none today.  She is only drinking glucerna and not eating well.  She is quite weak per RN.  I'm afraid she is dwindling.  With her permission, I called her daughter Ramonita and left a voicemail. 10/27 - Patient continues to get weaker and appear more SOB.  CXR today with worsening opacities despite being on IV lasix and Albumin.  Abd xray with non obstructive pattern but she has continued discomfort- I am stopping lactulose. Renal function continues to worsen.  Nephrology reports she does not wish for HD and hospice would be appropriate. I discussed with patient who understandably wants to discuss with her family.  I called her daughters and spoke to two by 3 way call to update them on current events and the recommendation for hospice.  They will visit today to speak to their mother further and will let us know their wishes. 10/28 I Dr. Graff have assumed care. Family has decided to go for home hospice. I have been informed by the s/w that hospice has been arranged.       Patient was seen and examined on the date of discharge and determined to be suitable for discharge.    Physical Exam  /75 (BP Location: Right arm, Patient Position: Lying)   Pulse 83   Temp 97.2 °F (36.2 °C) (Oral)   Resp 18   Ht 5' 3" (1.6 m)   Wt 57 kg (125 lb " 10.6 oz)   LMP  (LMP Unknown)   SpO2 (!) 94%   BMI 22.26 kg/m²   Vitals reviewed.    Constitutional: No distress.   HENT: Atraumatic.   Cardiovascular: Normal rate, regular rhythm and normal heart sounds.   Pulmonary/Chest: Effort normal. Clear to auscultation bilaterally. No wheezes.   Abdominal: Soft. Bowel sounds are normal. Exhibits no distension and no mass. No tenderness  Neurological: Alert.   Skin: Skin is warm and dry.     Following labs were Reviewed   Recent Labs   Lab 10/28/22  0529   CALCIUM 8.0*   ALBUMIN 3.1*   PROT 5.5*   *   K 5.0   CO2 29   CL 99   BUN 83*   CREATININE 2.4*   ALKPHOS 61   ALT 25   AST 23   BILITOT 0.7     No results found for: POCTGLUCOSE     All labs within the past 24 hours have been reviewed    Microbiology Results (last 7 days)       Procedure Component Value Units Date/Time    Blood culture x two cultures. Draw prior to antibiotics. [[id number]] Collected: 10/16/22 1945    Order Status: Completed Specimen: Blood from Peripheral, Antecubital, Right Updated: 10/21/22 2032     Blood Culture, Routine No growth after 5 days.    Narrative:      Aerobic and anaerobic    Blood culture x two cultures. Draw prior to antibiotics. [[id number]] Collected: 10/16/22 1840    Order Status: Completed Specimen: Blood from Peripheral, Hand, Right Updated: 10/21/22 2032     Blood Culture, Routine No growth after 5 days.    Narrative:      Aerobic and anaerobic          X-Ray Chest AP Portable   Final Result      X-Ray Abdomen AP 1 View   Final Result      US Lower Extremity Veins Bilateral   Final Result      No evidence of DVT in the bilateral lower extremities.         Electronically signed by: Ariel Barr MD   Date:    10/25/2022   Time:    15:34      X-Ray Chest PA And Lateral   Final Result      1. Unchanged cardiomegaly, interstitial opacities, and small bilateral pleural effusions and associated airspace opacity either reflecting atelectasis or consolidation.  The findings  suggest pulmonary edema related to heart failure or hypervolemia, without significant change.   2. Unchanged scattered linear opacities in mid and lower lung zones suggesting superimposed atelectasis and or scarring.         Electronically signed by: Juancho Navarro MD   Date:    10/25/2022   Time:    13:57      X-Ray Abdomen AP 1 View   Final Result      X-Ray Chest AP Portable   Final Result      X-Ray Chest AP Portable   Final Result      X-Ray Abdomen Flat And Erect   Final Result      X-Ray KUB   Final Result      X-Ray Chest PA And Lateral   Final Result      X-Ray Chest AP Portable   Final Result      X-Ray Chest AP Portable   Final Result      X-ray Chest AP Portable   Final Result          No results found. However, due to the size of the patient record, not all encounters were searched. Please check Results Review for a complete set of results.      Consultants and Procedures   Consultants:  Consults (From admission, onward)          Status Ordering Provider     Inpatient consult to Nephrology  Once        Provider:  Miguel Jo MD    Acknowledged GOYO OSBORNE     Inpatient consult to Social Work/Case Management  Once        Provider:  (Not yet assigned)    Acknowledged GOYO OSBORNE     Inpatient consult to Cardiology  Once        Provider:  Tylor De La Fuente MD    Acknowledged GOYO OSBORNE     Inpatient consult to Pulmonology  Once        Provider:  Harpreet Chua MD    Completed GOYO OSBORNE     Inpatient consult to Registered Dietitian/Nutritionist  Once        Provider:  (Not yet assigned)    Completed RIKA VILLAGRAN     Inpatient consult to Hospitalist  Once        Provider:  Rika Villagran NP    Acknowledged SHOSHANA REMY            Procedures:   none    Discharge Medications:     Medication List        STOP taking these medications      acetaminophen 325 MG tablet  Commonly known as: TYLENOL     albuterol 2.5 mg /3 mL (0.083 %) nebulizer solution  Commonly known as:  PROVENTIL     allopurinoL 100 MG tablet  Commonly known as: ZYLOPRIM     ascorbic acid (vitamin C) 500 MG tablet  Commonly known as: VITAMIN C     calcium carbonate 500 mg calcium (1,250 mg) tablet  Commonly known as: OS-TASIA     ciclopirox 8 % Soln  Commonly known as: PENLAC     diltiaZEM 180 MG 24 hr capsule  Commonly known as: CARDIZEM CD     ferrous sulfate 325 mg (65 mg iron) Tab tablet  Commonly known as: FEOSOL     fish oil-omega-3 fatty acids 300-1,000 mg capsule     FOLBIC 2.5-25-2 mg Tab  Generic drug: folic acid-vit B6-vit B12 2.5-25-2 mg     furosemide 20 MG tablet  Commonly known as: LASIX     levothyroxine 88 MCG tablet  Commonly known as: SYNTHROID     losartan 25 MG tablet  Commonly known as: COZAAR     mepolizumab 100 mg/mL Atin  Generic drug: mepolizumab     montelukast 10 mg tablet  Commonly known as: SINGULAIR     multivitamin tablet  Commonly known as: THERAGRAN     omeprazole 40 MG capsule  Commonly known as: PRILOSEC     polyethylene glycol 17 gram/dose powder  Commonly known as: GLYCOLAX     rosuvastatin 20 MG tablet  Commonly known as: CRESTOR     sertraline 50 MG tablet  Commonly known as: ZOLOFT     simethicone 180 mg Cap     TRELEGY ELLIPTA 200-62.5-25 mcg inhaler  Generic drug: fluticasone-umeclidin-vilanter                I spent 35 minutes preparing the discharge including reviewing records from previous encounters, preparation of discharge summary, assessing and final examination of the patient, discharge medicine reconciliation, discussing plan of care, follow up and education and prescriptions.       Jenaro Graff  [facility] Hospitalist  10/28/2022

## 2022-10-28 NOTE — CARE UPDATE
10/27/22 2149   Patient Assessment/Suction   Level of Consciousness (AVPU) alert   Respiratory Effort Normal;Unlabored   Expansion/Accessory Muscles/Retractions no use of accessory muscles   All Lung Fields Breath Sounds Anterior:;Lateral:;clear;diminished   Rhythm/Pattern, Respiratory unlabored   Cough Frequency infrequent   PRE-TX-O2   O2 Device (Oxygen Therapy) nasal cannula   $ Is the patient on Low Flow Oxygen? Yes   Flow (L/min) 3   SpO2 97 %   Pulse Oximetry Type Intermittent   $ Pulse Oximetry - Multiple Charge Pulse Oximetry - Multiple   Pulse 86   Resp 18   Preset CPAP/BiPAP Settings   $ Is patient using? No/refused   Reason patient is not wearing? Patient refused   Education   $ Education DME Oxygen;15 min   Respiratory Evaluation   $ Care Plan Tech Time 15 min

## 2022-10-28 NOTE — PLAN OF CARE
Problem: Physical Therapy  Goal: Physical Therapy Goal  Description: Goals to be met by: 22     Patient will increase functional independence with mobility by performin. Supine to sit with Supervision  2. Sit to stand transfer with Supervision  3. Bed to chair transfer with Supervision using Rolling Walker  4. Gait  x 50 feet with Supervision using Rolling Walker.       Face to face with PTA; change in frequency due to anticipated change in discharge plan.    Outcome: Unable to Meet, Plan Revised

## 2022-11-16 ENCOUNTER — PES CALL (OUTPATIENT)
Dept: ADMINISTRATIVE | Facility: CLINIC | Age: 87
End: 2022-11-16
Payer: MEDICARE

## 2023-02-02 NOTE — ED PROVIDER NOTES
-metabolic encephalopathy secondary to profound hypothyroidism, illicit drug use and folic acid deficiency  -record review reveals patient has had an inpatient psychiatric admission previously however no notes available  -tele psych recommends inpatient psych  -nonreactive syphilis/HIV, B12 wnl  -folate low--> replete  -risperidone, sertraline     Encounter Date: 6/6/2019    SCRIBE #1 NOTE: I, Mara Haji, am scribing for, and in the presence of, Dr. Dudley.       History     Chief Complaint   Patient presents with    Abdominal Pain     with occas vomiting x 2 weeks.    Cellulitis     R forearm     Time seen by provider: 2:32 PM on 06/06/2019    Blaire Cage is a 88 y.o. female who presents to the ED with an onset of upper abdominal pain that began one week ago. Patient was sent to the ED by PCP Dr. Sarmiento for further evaluation abdominal pain, vomiting, and rash. She reports vomiting with solid food. She reports sensation that food is getting stuck, and vomits shortly after eating. It is easier for her to tolerate liquids. Relatives reports patient has previous similar episodes of symptoms, but are unsure of cause. She reports 3 to 4 episodes of diarrhea per day. She reports taking supplement iron and having associated dark stools. She states she is unable to is visualize if there is any blood in her stool since it is dark due to taking supplemental iron. She is currently taking apixaban. She reports rash to bilateral forearms. She denies using any treatments for relief. The patient denies trouble swallowing, constipation, dysuria, back pain, or any other symptoms at this time. PMHx includes anemia, aortic aneurysm, CHF, COPD, DM2, HLD, HTN, hypothyroidism, PE, DVT on Eliquis. Pertinent PSHx includes appendectomy, cholecystomy, and hysterectomy. Multiple drug allergies noted.      The history is provided by the patient and a relative.     Review of patient's allergies indicates:   Allergen Reactions    Carvedilol Other (See Comments)     Bradycardia and syncope    Boniva [ibandronate]     Codeine     Hydralazine analogues     Iodinated contrast- oral and iv dye Hives    Kionex [sodium polystyrene sulfonate] Diarrhea     From encounter 12/11/17 for diarrhea--not true allergy.    Lisinopril     Morphine      Past Medical History:   Diagnosis  Date    Anemia due to multiple mechanisms 2018    Anemia, chronic disease 2018    Anemia, chronic renal failure, stage 3 (moderate) 2018    Anticoagulant long-term use     aspirin    Aortic aneurysm     CHF (congestive heart failure)     COPD (chronic obstructive pulmonary disease)     COPD with acute exacerbation 2015    Diabetes mellitus type II     DVT (deep venous thrombosis) 2018    Encounter for blood transfusion     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 2018    Hip arthritis 3/1/2016    Homocysteinemia 2018    Hyperlipidemia     Hypertension     Normocytic normochromic anemia 2018    PE (pulmonary thromboembolism) 2018    Pneumonia of right lower lobe due to infectious organism 2017    Thyroid disease     hypothyroid     Past Surgical History:   Procedure Laterality Date    APPENDECTOMY      CHOLECYSTECTOMY      ESOPHAGOGASTRODUODENOSCOPY (EGD) N/A 10/25/2017    Performed by [facility] at [facility] ENDO    FRACTURE SURGERY      right hip     HERNIA REPAIR      groin    HYSTERECTOMY      Insertion, Implantable Loop Recorder N/A 2018    Performed by Ashok Herbert MD at [facility] CATH LAB    PINNING, HIP, PERCUTANEOUS Left 3/23/2019    Performed by J[facility]je Hamlin [facility] at [facility] OR    VARICOSE VEIN SURGERY       Family History   Problem Relation Age of Onset    Hypertension Mother     Heart disease Mother     Lung disease Father     Diabetes Sister     Diabetes Brother     Kidney disease Son      Social History     Tobacco Use    Smoking status: Former Smoker     Packs/day: 0.35     Years: 44.00     Pack years: 15.40     Types: Cigarettes     Last attempt to quit: 2015     Years since quittin.1    Smokeless tobacco: Never Used    Tobacco comment: 2015   Substance Use Topics    Alcohol use: No     Alcohol/week: 0.0 oz    Drug use: No     Review of Systems   Constitutional: Negative for fever.   HENT:  Negative for trouble swallowing.    Respiratory: Negative for cough and shortness of breath.    Cardiovascular: Negative for chest pain and leg swelling.   Gastrointestinal: Positive for abdominal pain, diarrhea, nausea and vomiting. Negative for abdominal distention and constipation.   Genitourinary: Negative for dysuria.   Musculoskeletal: Negative for back pain.   Skin: Positive for rash.   Neurological: Negative for headaches.   Hematological: Bruises/bleeds easily.       Physical Exam     Initial Vitals [06/06/19 1420]   BP Pulse Resp Temp SpO2   (!) 119/56 65 18 98.3 °F (36.8 °C) 95 %      MAP       --         Physical Exam    Nursing note and vitals reviewed.  Constitutional: She appears well-developed and well-nourished. She is not diaphoretic. No distress.   HENT:   Head: Normocephalic and atraumatic.   Mouth/Throat: Oropharynx is clear and moist.   Eyes: Conjunctivae are normal.   Neck: Neck supple.   Cardiovascular: Normal rate, regular rhythm, normal heart sounds and intact distal pulses. Exam reveals no gallop and no friction rub.    No murmur heard.  Pulmonary/Chest: Breath sounds normal. She has no wheezes. She has no rhonchi. She has no rales.   Abdominal: Soft. She exhibits no distension and no mass. There is tenderness (Primarily epigastric) in the right upper quadrant, epigastric area and left upper quadrant. There is guarding (Minimal guarding).   No involuntary guarding. No flank tenderness. No organomegaly.    Musculoskeletal: Normal range of motion.   Neurological: She is alert and oriented to person, place, and time.   Skin: Ecchymosis noted. No rash noted.   Psychiatric: Her speech is normal.   BUE have ecchymosis. R>L. Does not marianna to direct pressure. No induration.          ED Course   Procedures  Labs Reviewed   CBC W/ AUTO DIFFERENTIAL - Abnormal; Notable for the following components:       Result Value    RBC 3.33 (*)     Hemoglobin 10.2 (*)     Hematocrit 32.1 (*)     Mean  Corpuscular Hemoglobin Conc 31.8 (*)     MPV 8.6 (*)     Lymph% 16.0 (*)     All other components within normal limits   COMPREHENSIVE METABOLIC PANEL - Abnormal; Notable for the following components:    BUN, Bld 39 (*)     Creatinine 1.8 (*)     eGFR if  29 (*)     eGFR if non  25 (*)     All other components within normal limits   LIPASE          Imaging Results          CT Abdomen Pelvis  Without Contrast (Final result)  Result time 06/06/19 15:38:36    Final result by Harpreet Briggs MD (06/06/19 15:38:36)                 Impression:      1. Findings suspicious for infectious or inflammatory colitis.  2. Bilateral non-obstructing nephrolithiasis.  3. Infrarenal AAA, unchanged at 4 cm.  4. Small hiatal hernia.  5. Internal fixation of the left femur.  Tip of the most proximal screw is positioned at or near the femoral head cortex, and may partially penetrate the cortex.      Electronically signed by: Harpreet Briggs  Date:    06/06/2019  Time:    15:38             Narrative:    EXAMINATION:  CT ABDOMEN PELVIS WITHOUT CONTRAST    CLINICAL HISTORY:  Abdominal pain, unspecified;Epigastric pain, vomiting, r/o obstruction;    TECHNIQUE:  Low dose axial images, sagittal and coronal reformations were obtained from the lung bases to the pubic symphysis.  Oral contrast was not administered.    COMPARISON:  CT 10/02/2018; radiographs dated 05/13/2019 and 04/11/2019    FINDINGS:  Mild scar or atelectasis in the right lower lobe.  Heart size upper limit of normal with calcification of the mitral annulus.  Small hiatal hernia.  Cholecystectomy.  Bilateral Bochdalek hernias.    There are 2 stones in the right renal collecting system largest 6 mm in size.  There are 2 stones in the left renal collecting system, largest 8 mm.  Remaining solid abdominal organs grossly unremarkable on this noncontrast exam.    There is no enteric contrast which limits bowel assessment.  No dilated bowel loops.   There is wall thickening and fat stranding along the distal descending colon and proximal sigmoid colon.  There are a few scattered colonic diverticula.    Atherosclerosis.  Fusiform infrarenal abdominal aortic aneurysm, maximal dimension 4 cm.  This terminates before the iliac bifurcation.  Hysterectomy.  Urinary bladder unremarkable.  Intrapelvic structures partially obscured by beam hardening and streak artifact from hardware.    Osteopenia.  Moderate multilevel degenerative change throughout the lumbar spine with slight levocurvature centered near the thoracolumbar junction.  There is a right total hip arthroplasty with no periprosthetic lucency.  There are 3 fixation screws along the left proximal femur spanning the neck.  The cranial most fixation screw tip extends to the level of the superior femoral head cortex as can be seen series 602, image 115.                            (rad read)        Medical Decision Making:   History:   Old Medical Records: I decided to obtain old medical records.  Independently Interpreted Test(s):   I have ordered and independently interpreted EKG Reading(s) - see prior notes  Clinical Tests:   Lab Tests: Ordered and Reviewed  Radiological Study: Ordered and Reviewed  Medical Tests: Ordered and Reviewed            Scribe Attestation:   Scribe #1: I performed the above scribed service and the documentation accurately describes the services I performed. I attest to the accuracy of the note.    I, Dr. Harpreet Dudley, personally performed the services described in this documentation. All medical record entries made by the scribe were at my direction and in my presence.  I have reviewed the chart and agree that the record reflects my personal performance and is accurate and complete. Harpreet Dudley MD.  10:31 PM 06/06/2019    Blaire Cage is a 88 y.o. female presenting with chronic intermittent epigastric pain the setting of recent emesis and diarrhea.  Possible colitis  on CT discussed with patient and family of uncertain significance.  I doubt bacterial infectious processes in not think antibiotics are indicated at this point.  This should be followed up with GI as planned with appointment tomorrow.  I did offer admission declined by patient and family.  She notably has had no emesis or diarrhea throughout multiple hours in the emergency department.  She is tolerating fluids.  Prior endoscopy is indicated gastritis as well as esophageal dysmotility possibly contributing to current symptoms. EKG was reviewed with no sign of acute ischemia or infarction.  I doubt ACS.  I do not think further troponin enzyme assay or provocative testing is indicated.  No sign of other emergent intra-abdominal process such as obstruction, appendicitis, abscess.  Lipase is normal and I doubt pancreatitis.  Detailed return precautions reviewed.        ED Course as of Jun 06 2231   Thu Jun 06, 2019   1521 EKG:  NSR, rate of 64, normal intervals and axis.  Prominent T-waves.  No QRS widening to indicate hyperkalemia.  There are no acute ST or T wave changes suggestive of acute ischemia or infarction.      [MR]      ED Course User Index  [MR] Harpreet Dudley MD     Clinical Impression:       ICD-10-CM ICD-9-CM   1. Epigastric pain R10.13 789.06                                Harpreet Dudley MD  06/06/19 [id number]

## 2024-04-05 NOTE — PLAN OF CARE
Discharge planner sent home health referral to N via [facility] care , awaiting approval     Patient assigned to [facility] per Nadya with [facility].   Female

## 2025-07-09 NOTE — ED NOTES
6-5-25 12-9-25   Patient and family have been updated on plan of care and lab results. No needs or questions at this time.

## (undated) DEVICE — SEE MEDLINE ITEM 152530

## (undated) DEVICE — SUT CTD VICRYL 2.0

## (undated) DEVICE — SPONGE SUPER KERLIX 6X6.75IN

## (undated) DEVICE — DRAPE C ARM 42 X 120 10/BX

## (undated) DEVICE — TAPE ADH MEDIPORE 4 X 10YDS

## (undated) DEVICE — SEE MEDLINE ITEM 146231

## (undated) DEVICE — GOWN B1 X-LG X-LONG

## (undated) DEVICE — GLOVE PROTEXIS PI CLASSIC 8.0

## (undated) DEVICE — GLOVE 7.5 PROTEXIS PI BLUE

## (undated) DEVICE — Device

## (undated) DEVICE — BIT DRILL 2.5

## (undated) DEVICE — GLOVE PROTEXIS PI CLASSIC 7.5

## (undated) DEVICE — SOL 9P NACL IRR PIC IL

## (undated) DEVICE — PAD CAST SPECIALIST STRL 6

## (undated) DEVICE — STAPLER SKIN ROTATING HEAD

## (undated) DEVICE — APPLICATOR CHLORAPREP ORN 26ML

## (undated) DEVICE — DRESSING TRANS 4X4 TEGADERM

## (undated) DEVICE — LINER SUCTION 3000CC

## (undated) DEVICE — PACK CUSTOM UNIV BASIN SLI

## (undated) DEVICE — DRAPE IOBAN 2 STERI

## (undated) DEVICE — STRAP OR TABLE 5IN X 72IN

## (undated) DEVICE — SUT MONOCRYL 3-0 PS-1

## (undated) DEVICE — DRESSING DERMACEA SPNG 10S

## (undated) DEVICE — ELECTRODE REM PLYHSV RETURN 9

## (undated) DEVICE — SLEEVE SCD EXPRESS CALF MEDIUM

## (undated) DEVICE — SEE MEDLINE ITEM 146292

## (undated) DEVICE — WIRE 2.8 X 300 MM THREADED
Type: IMPLANTABLE DEVICE | Site: HIP | Status: NON-FUNCTIONAL
Removed: 2019-03-23

## (undated) DEVICE — DRESSING N ADH OIL EMUL 3X8